# Patient Record
Sex: FEMALE | Race: WHITE | NOT HISPANIC OR LATINO | Employment: OTHER | URBAN - METROPOLITAN AREA
[De-identification: names, ages, dates, MRNs, and addresses within clinical notes are randomized per-mention and may not be internally consistent; named-entity substitution may affect disease eponyms.]

---

## 2017-01-05 ENCOUNTER — PROCEDURE VISIT (OUTPATIENT)
Dept: UROLOGY | Facility: CLINIC | Age: 67
End: 2017-01-05
Payer: MEDICARE

## 2017-01-05 VITALS — HEART RATE: 82 BPM | DIASTOLIC BLOOD PRESSURE: 80 MMHG | SYSTOLIC BLOOD PRESSURE: 134 MMHG

## 2017-01-05 DIAGNOSIS — N20.0 KIDNEY STONE: ICD-10-CM

## 2017-01-05 PROCEDURE — 52310 CYSTOSCOPY AND TREATMENT: CPT | Mod: S$GLB,,, | Performed by: UROLOGY

## 2017-01-05 NOTE — PROCEDURES
Procedures   Procedure: Flexible cysto-uretheroscopy and stent removal   Pre Procedure Diagnosis:s/p ureteroscopy and stent placement  Post Procedure Diagnosis:same   Surgeon: Greg Lyons MD   Anesthesia: 2% uro-jet lidocaine jelly for local analgesia   Flexible cysto-urethroscopy was performed after consent was obtained. The risks and benefits were explained.   2% lidocaine urojet was used for local analgesia.   The genitalia was prepped and draped in the sterile fashion with betadine.   The flexible scope was advanced into the urethra and into the bladder. The stent was removed without difficulty.   The patient tolerated the procedure well without complication.   They will follow up in 6 weeks with a renal ultrasound and KUB.

## 2017-01-13 ENCOUNTER — PATIENT MESSAGE (OUTPATIENT)
Dept: UROLOGY | Facility: CLINIC | Age: 67
End: 2017-01-13

## 2017-01-13 ENCOUNTER — LAB VISIT (OUTPATIENT)
Dept: LAB | Facility: HOSPITAL | Age: 67
End: 2017-01-13
Attending: UROLOGY
Payer: MEDICARE

## 2017-01-13 ENCOUNTER — TELEPHONE (OUTPATIENT)
Dept: UROLOGY | Facility: CLINIC | Age: 67
End: 2017-01-13

## 2017-01-13 DIAGNOSIS — N39.0 URINARY TRACT INFECTION WITH HEMATURIA, SITE UNSPECIFIED: Primary | ICD-10-CM

## 2017-01-13 DIAGNOSIS — N39.0 URINARY TRACT INFECTION WITH HEMATURIA, SITE UNSPECIFIED: ICD-10-CM

## 2017-01-13 DIAGNOSIS — R31.9 URINARY TRACT INFECTION WITH HEMATURIA, SITE UNSPECIFIED: ICD-10-CM

## 2017-01-13 DIAGNOSIS — R31.9 URINARY TRACT INFECTION WITH HEMATURIA, SITE UNSPECIFIED: Primary | ICD-10-CM

## 2017-01-13 PROCEDURE — 87186 SC STD MICRODIL/AGAR DIL: CPT

## 2017-01-13 PROCEDURE — 87088 URINE BACTERIA CULTURE: CPT

## 2017-01-13 PROCEDURE — 87077 CULTURE AEROBIC IDENTIFY: CPT

## 2017-01-13 PROCEDURE — 87086 URINE CULTURE/COLONY COUNT: CPT

## 2017-01-13 NOTE — TELEPHONE ENCOUNTER
----- Message from Mackenzie Fried sent at 1/13/2017 12:33 PM CST -----  Contact: [phone]/ self   Patient requesting to speak with you regarding bladder infection.    Patient is requesting to drop urine off .    Please advise.

## 2017-01-16 ENCOUNTER — PATIENT MESSAGE (OUTPATIENT)
Dept: UROLOGY | Facility: CLINIC | Age: 67
End: 2017-01-16

## 2017-01-16 ENCOUNTER — TELEPHONE (OUTPATIENT)
Dept: UROLOGY | Facility: CLINIC | Age: 67
End: 2017-01-16

## 2017-01-16 ENCOUNTER — PATIENT OUTREACH (OUTPATIENT)
Dept: OTHER | Facility: OTHER | Age: 67
End: 2017-01-16
Payer: MEDICARE

## 2017-01-16 LAB — BACTERIA UR CULT: NORMAL

## 2017-01-16 RX ORDER — AMOXICILLIN AND CLAVULANATE POTASSIUM 875; 125 MG/1; MG/1
1 TABLET, FILM COATED ORAL 2 TIMES DAILY
Qty: 20 TABLET | Refills: 0 | Status: SHIPPED | OUTPATIENT
Start: 2017-01-16 | End: 2017-01-16 | Stop reason: ALTCHOICE

## 2017-01-16 NOTE — LETTER
"   Frances Sanches, PharmD  [address] [place] [address]     Dear Vivienne Jose,    Welcome to the [facility]!         My name is Frances Sanches and I am your dedicated clinical pharmacist.  As an expert in medication management, I will help ensure that the medications you are taking continue to provide you with the intended benefits.      This is Maria Luisa Dior and she will be your health  for the duration of the program.  Her job is to help you identify lifestyle changes to improve your blood pressure control.  You will talk about nutrition, exercise, and other ways that you may be able to adjust your current habits to better your health. Together, we will work to improve your overall health and encourage you to meet your goals for a healthier lifestyle.    What we expect from YOU:    You will need to take blood pressure readings multiple times a week and no less than one reading per week.   It is important that you take your measurements at different times during the day, when possible.     What you should expect from your Digital Medicine Care Team:   We will provide you with education about high blood pressure, including lifestyle changes that could help you to control your blood pressure.   We will review your weekly readings and provide you with monthly blood pressure progress reports after you have been in the program for more than 30 days.   We will send monthly progress reports on your blood pressure control to your physician so they can follow along with your progress as well.    You will be able to reach me by phone at   or through your [facility] account by clicking "Send a message to your doctor's office" on the home screen then selecting my name in the "To the office of:" field.     I look forward to working with you to achieve your blood pressure goals!    Sincerely,    Frances Sanches PharmD  Your personal Clinical Pharmacist    Please visit " [email or web address] to learn more about high blood pressure and what you can do lower your blood pressure.                                                                                         Vivienne Jose  [address]  [place] [address]

## 2017-01-16 NOTE — TELEPHONE ENCOUNTER
----- Message from Greg Lyons MD sent at 1/16/2017 12:31 PM CST -----  Ordering augmentin.  Please call patient and notify of positive culture and antibiotics.

## 2017-01-18 ENCOUNTER — PATIENT MESSAGE (OUTPATIENT)
Dept: ENDOCRINOLOGY | Facility: CLINIC | Age: 67
End: 2017-01-18

## 2017-01-18 DIAGNOSIS — E78.5 HYPERLIPIDEMIA: ICD-10-CM

## 2017-01-18 DIAGNOSIS — E11.9 TYPE 2 DIABETES MELLITUS WITHOUT COMPLICATION: ICD-10-CM

## 2017-01-18 RX ORDER — LOVASTATIN 40 MG/1
TABLET ORAL
Qty: 90 TABLET | Refills: 0 | Status: SHIPPED | OUTPATIENT
Start: 2017-01-18 | End: 2017-04-29 | Stop reason: SDUPTHER

## 2017-01-18 RX ORDER — METFORMIN HYDROCHLORIDE 1000 MG/1
TABLET ORAL
Qty: 180 TABLET | Refills: 0 | Status: SHIPPED | OUTPATIENT
Start: 2017-01-18 | End: 2017-10-08 | Stop reason: SDUPTHER

## 2017-01-19 ENCOUNTER — PATIENT MESSAGE (OUTPATIENT)
Dept: ALLERGY | Facility: CLINIC | Age: 67
End: 2017-01-19

## 2017-01-19 ENCOUNTER — PATIENT OUTREACH (OUTPATIENT)
Dept: OTHER | Facility: OTHER | Age: 67
End: 2017-01-19
Payer: MEDICARE

## 2017-01-19 ENCOUNTER — CLINICAL SUPPORT (OUTPATIENT)
Dept: INTERNAL MEDICINE | Facility: CLINIC | Age: 67
End: 2017-01-19
Payer: MEDICARE

## 2017-01-19 DIAGNOSIS — J30.1 SEASONAL ALLERGIC RHINITIS DUE TO POLLEN: ICD-10-CM

## 2017-01-19 DIAGNOSIS — J30.81 NON-SEASONAL ALLERGIC RHINITIS DUE TO ANIMAL HAIR AND DANDER: ICD-10-CM

## 2017-01-19 PROCEDURE — 95115 IMMUNOTHERAPY ONE INJECTION: CPT | Mod: S$GLB,,, | Performed by: INTERNAL MEDICINE

## 2017-01-19 NOTE — PROGRESS NOTES
Patient came in for her monthly allergy injection. 0.5ml of vial #5, BLACK, given to the upper left posterior arm.  Patient tolerated injection well with no reaction  Noted or verbalized.

## 2017-01-19 NOTE — PROGRESS NOTES
"Last 5 Patient Entered Readings                                                               Current 30 Day Average: 140/71     Recent Readings 1/18/2017 1/17/2017 1/15/2017 1/14/2017 1/14/2017    Systolic BP (mmHg) 140 136 138 146 151    Diastolic BP (mmHg) 67 71 68 78 83    Pulse 70 67 64 68 72        Follow up with Ms. Vivienne Jose completed. Ms. Palafox is doing well. She has had "a lot of things come up" so she hasn't been able to join the gym yet. She has been cooking and eating more at home which helps her better monitor her sodium intake. Patient did not have any further questions or concerns. I will follow up in a few weeks to see how she is doing and progressing.          "

## 2017-01-20 ENCOUNTER — OFFICE VISIT (OUTPATIENT)
Dept: ALLERGY | Facility: CLINIC | Age: 67
End: 2017-01-20
Payer: MEDICARE

## 2017-01-20 VITALS
BODY MASS INDEX: 43.51 KG/M2 | DIASTOLIC BLOOD PRESSURE: 70 MMHG | OXYGEN SATURATION: 97 % | SYSTOLIC BLOOD PRESSURE: 148 MMHG | WEIGHT: 245.56 LBS | HEIGHT: 63 IN | HEART RATE: 80 BPM

## 2017-01-20 DIAGNOSIS — H10.13 ALLERGIC CONJUNCTIVITIS, BILATERAL: ICD-10-CM

## 2017-01-20 DIAGNOSIS — J30.9 CHRONIC ALLERGIC RHINITIS: Primary | ICD-10-CM

## 2017-01-20 DIAGNOSIS — Z51.6 ALLERGY DESENSITIZATION THERAPY: ICD-10-CM

## 2017-01-20 PROCEDURE — 3078F DIAST BP <80 MM HG: CPT | Mod: S$GLB,,, | Performed by: ALLERGY & IMMUNOLOGY

## 2017-01-20 PROCEDURE — 99214 OFFICE O/P EST MOD 30 MIN: CPT | Mod: S$GLB,,, | Performed by: ALLERGY & IMMUNOLOGY

## 2017-01-20 PROCEDURE — 3077F SYST BP >= 140 MM HG: CPT | Mod: S$GLB,,, | Performed by: ALLERGY & IMMUNOLOGY

## 2017-01-20 PROCEDURE — 1159F MED LIST DOCD IN RCRD: CPT | Mod: S$GLB,,, | Performed by: ALLERGY & IMMUNOLOGY

## 2017-01-20 PROCEDURE — 99999 PR PBB SHADOW E&M-EST. PATIENT-LVL III: CPT | Mod: PBBFAC,,, | Performed by: ALLERGY & IMMUNOLOGY

## 2017-01-20 RX ORDER — FLUTICASONE PROPIONATE 50 MCG
2 SPRAY, SUSPENSION (ML) NASAL DAILY
Qty: 16 G | Refills: 12
Start: 2017-01-20 | End: 2017-12-06 | Stop reason: SDUPTHER

## 2017-01-20 NOTE — PROGRESS NOTES
Subjective:       Patient ID: Vivienne Jose is a 66 y.o. female.    Chief Complaint:  Allergies (annual visit, extract expiring)      HPI Comments: 66 year-old woman presents for continued evaluation of chronic allergic rhinitis and conjunctivitis on IT.  She was last seen 10/16/15. In 2012 she had skin test with 4+ cat and dust mites and 1+ willow tree.  She was started on Claritin daily and fluticasone and Zaditor drops in morning.  She did ok on this but had flares so added azelastine 2 SEN BID as needed.  She would still  get sneeze, runny nose, sore throat, dry itchy eyes, stuffy nose and PND.  She started allergy shots 7/2015. She reached maintenance about 12/2015. She has don much better, feels shots really help. No bad flares. No sinus infections. She has occ sneeze fits but minimal runny nose or congestion. The week before shots she gets itchy eyes. She is on Flonase 2 SEN in AM, azelastine 1 SEN in PM, Zaditor 1 drop each eye in AM and Claritin at night.  She has had no reactions to shots, no itch, no hives, no swelling, no SOB, no wheeze, no dizziness.  She does have DM covers on all bedding.  She has a dog at home but no cats.  Occ has bad cough and when does adds Mucinex DM and helps.    No CAD, she had angiogram and told mild blockage but no stents.  She is on ACE-I and ca channel blocker for HTN.      new patient evaluation of allergies.  She used to see Dr Weber and was on allergy shots from [id number].  She states they worked great and all symptoms resolved.  However over last year she has felt symptoms returning.  Her eyes itch intensely and are dry at times and water at times.  She has runny nose only when eating otherwise has some sinus pressure and PND>  No stuffy nose.  She sneezes a few times each morning.  She has always been worse around cats but does not have one.  Occ has chest congestion but no asthma.  NO eczema.  No food allergy.  She was worse few months ago otherwise not sure of  season.  No difference nay time of day, no diff inside or out.  Claritin prn helps but makes her sleepy.  Using Zaditor with minimal relief.        Environmental History: see history    Review of Systems   Constitutional: Negative for fever, chills, appetite change and fatigue.   HENT: Positive for rhinorrhea, sneezing and postnasal drip. Negative for ear pain, nosebleeds, congestion, sore throat, facial swelling, trouble swallowing, voice change, sinus pressure and ear discharge.    Eyes: Positive for discharge and itching. Negative for redness and visual disturbance.   Respiratory: Negative for cough, choking, chest tightness, shortness of breath and wheezing.    Cardiovascular: Negative for chest pain, palpitations and leg swelling.   Gastrointestinal: Negative for nausea, vomiting, abdominal pain, diarrhea, constipation and abdominal distention.   Genitourinary: Negative for difficulty urinating.   Musculoskeletal: Negative for myalgias, joint swelling, arthralgias and gait problem.   Skin: Negative for color change and rash.   Neurological: Negative for dizziness, syncope, weakness, light-headedness and headaches.   Hematological: Negative for adenopathy. Does not bruise/bleed easily.   Psychiatric/Behavioral: Negative for behavioral problems, confusion, disturbed wake/sleep cycle and agitation. The patient is not nervous/anxious.         Objective:    Physical Exam   Nursing note and vitals reviewed.  Constitutional: She is oriented to person, place, and time. She appears well-developed and well-nourished. No distress.   HENT:   Head: Normocephalic and atraumatic.   Right Ear: Hearing, tympanic membrane, external ear and ear canal normal.   Left Ear: Hearing, tympanic membrane, external ear and ear canal normal.   Nose: No mucosal edema, rhinorrhea, sinus tenderness or septal deviation. No epistaxis. Right sinus exhibits no maxillary sinus tenderness and no frontal sinus tenderness. Left sinus exhibits no  maxillary sinus tenderness and no frontal sinus tenderness.   Mouth/Throat: Uvula is midline, oropharynx is clear and moist and mucous membranes are normal. No uvula swelling.   Eyes: Conjunctivae are normal. Right eye exhibits no discharge. Left eye exhibits no discharge.   Neck: Normal range of motion. No thyromegaly present.   Cardiovascular: Normal rate, regular rhythm and normal heart sounds.    No murmur heard.  Pulmonary/Chest: Effort normal and breath sounds normal. No respiratory distress. She has no wheezes.   Abdominal: Soft. She exhibits no distension. There is no tenderness.   Musculoskeletal: Normal range of motion. She exhibits no edema and no tenderness.   Lymphadenopathy:     She has no cervical adenopathy.   Neurological: She is alert and oriented to person, place, and time.   Skin: Skin is warm and dry. No rash noted. No erythema.   Psychiatric: She has a normal mood and affect. Her behavior is normal. Judgment and thought content normal.       Laboratory:   Percutaneous Skin Testing: Inhalants- 4+ dust mites, 3+ cat, 1+ willow with 3+ histamine control and remainder all negative  Assessment:       1. Chronic allergic rhinitis    2. Allergic conjunctivitis, bilateral    3. Allergy desensitization therapy         Plan:       1. Dust mite avoidance, measures discussed and handout provided.   2. Continue Flonase 2 SEN daily  3. Continue Claritin 10 mg daily  4. Zaditor prn  5. Continue azelastine 2 SEN BID as needed  6.continue immunotherapy, Discussed new allergy shot system with patient, advised on security measures and need to back up dose. Patient voiced understanding and agreed.    Patient advised to remain off beta blockers while on allergy shots and to notify injection clinic and MD of any new medications starts.

## 2017-01-25 DIAGNOSIS — I15.2 HYPERTENSION ASSOCIATED WITH DIABETES: ICD-10-CM

## 2017-01-25 DIAGNOSIS — E11.59 HYPERTENSION ASSOCIATED WITH DIABETES: ICD-10-CM

## 2017-01-26 ENCOUNTER — PATIENT MESSAGE (OUTPATIENT)
Dept: ENDOCRINOLOGY | Facility: CLINIC | Age: 67
End: 2017-01-26

## 2017-01-26 RX ORDER — LOSARTAN POTASSIUM AND HYDROCHLOROTHIAZIDE 25; 100 MG/1; MG/1
TABLET ORAL
Qty: 90 TABLET | Refills: 1 | Status: SHIPPED | OUTPATIENT
Start: 2017-01-26 | End: 2017-03-15 | Stop reason: ALTCHOICE

## 2017-01-31 ENCOUNTER — PATIENT OUTREACH (OUTPATIENT)
Dept: OTHER | Facility: OTHER | Age: 67
End: 2017-01-31
Payer: MEDICARE

## 2017-01-31 NOTE — PROGRESS NOTES
"Last 5 Patient Entered Readings                                                               Current 30 Day Average: 142/72     Recent Readings 1/30/2017 1/29/2017 1/29/2017 1/28/2017 1/27/2017    Systolic BP (mmHg) 134 133 157 153 137    Diastolic BP (mmHg) 73 72 78 77 72    Pulse 66 67 70 73 68        Ms Jose's BP is not at goal. Patient denies any symptoms. She is not sure her cuff fits appropriately. She cannot get it 2-3 cm (2 finger widths) above the crease of her elbow. When she closes it the flap is outside of the "ok" zone. She will go to the [facility] to be resized.     Current HTN regimen:  Outpatient Hypertension Medications as of 2/1/2017             amlodipine (NORVASC) 5 MG tablet Take 1 tablet (5 mg total) by mouth once daily.    losartan-hydrochlorothiazide 100-25 mg (HYZAAR) 100-25 mg per tablet TAKE 1 TABLET ONE TIME DAILY          Changes made today:  None, will await readings from new cuff.     Will continue to monitor regularly. Will follow up in 2-3 weeks, sooner if BP begins to trend upward or downward.    Patient has my contact information and knows to call with any concerns or clinical changes.     "

## 2017-02-01 ENCOUNTER — LAB VISIT (OUTPATIENT)
Dept: LAB | Facility: HOSPITAL | Age: 67
End: 2017-02-01
Attending: UROLOGY
Payer: MEDICARE

## 2017-02-01 DIAGNOSIS — N20.0 KIDNEY STONES: ICD-10-CM

## 2017-02-01 DIAGNOSIS — N39.0 URINARY TRACT INFECTION WITHOUT HEMATURIA, SITE UNSPECIFIED: ICD-10-CM

## 2017-02-01 PROCEDURE — 83735 ASSAY OF MAGNESIUM: CPT

## 2017-02-02 ENCOUNTER — TELEPHONE (OUTPATIENT)
Dept: UROLOGY | Facility: CLINIC | Age: 67
End: 2017-02-02

## 2017-02-02 ENCOUNTER — OFFICE VISIT (OUTPATIENT)
Dept: ENDOCRINOLOGY | Facility: CLINIC | Age: 67
End: 2017-02-02
Payer: MEDICARE

## 2017-02-02 ENCOUNTER — LAB VISIT (OUTPATIENT)
Dept: LAB | Facility: HOSPITAL | Age: 67
End: 2017-02-02
Attending: UROLOGY
Payer: MEDICARE

## 2017-02-02 ENCOUNTER — PATIENT MESSAGE (OUTPATIENT)
Dept: UROLOGY | Facility: CLINIC | Age: 67
End: 2017-02-02

## 2017-02-02 VITALS
HEIGHT: 63 IN | HEART RATE: 82 BPM | SYSTOLIC BLOOD PRESSURE: 140 MMHG | WEIGHT: 245.56 LBS | RESPIRATION RATE: 16 BRPM | DIASTOLIC BLOOD PRESSURE: 80 MMHG | BODY MASS INDEX: 43.51 KG/M2

## 2017-02-02 DIAGNOSIS — N39.0 URINARY TRACT INFECTION WITHOUT HEMATURIA, SITE UNSPECIFIED: ICD-10-CM

## 2017-02-02 DIAGNOSIS — N39.0 URINARY TRACT INFECTION WITHOUT HEMATURIA, SITE UNSPECIFIED: Primary | ICD-10-CM

## 2017-02-02 DIAGNOSIS — E11.9 TYPE 2 DIABETES MELLITUS WITHOUT COMPLICATION, WITH LONG-TERM CURRENT USE OF INSULIN: ICD-10-CM

## 2017-02-02 DIAGNOSIS — E78.2 MIXED HYPERLIPIDEMIA: Primary | ICD-10-CM

## 2017-02-02 DIAGNOSIS — Z79.4 TYPE 2 DIABETES MELLITUS WITHOUT COMPLICATION, WITH LONG-TERM CURRENT USE OF INSULIN: ICD-10-CM

## 2017-02-02 DIAGNOSIS — E03.9 HYPOTHYROIDISM, UNSPECIFIED TYPE: ICD-10-CM

## 2017-02-02 DIAGNOSIS — I10 ESSENTIAL HYPERTENSION: ICD-10-CM

## 2017-02-02 PROCEDURE — 3079F DIAST BP 80-89 MM HG: CPT | Mod: S$GLB,,, | Performed by: INTERNAL MEDICINE

## 2017-02-02 PROCEDURE — 99499 UNLISTED E&M SERVICE: CPT | Mod: S$GLB,,, | Performed by: INTERNAL MEDICINE

## 2017-02-02 PROCEDURE — 3077F SYST BP >= 140 MM HG: CPT | Mod: S$GLB,,, | Performed by: INTERNAL MEDICINE

## 2017-02-02 PROCEDURE — 99214 OFFICE O/P EST MOD 30 MIN: CPT | Mod: S$GLB,,, | Performed by: INTERNAL MEDICINE

## 2017-02-02 PROCEDURE — 3066F NEPHROPATHY DOC TX: CPT | Mod: S$GLB,,, | Performed by: INTERNAL MEDICINE

## 2017-02-02 PROCEDURE — 3045F PR MOST RECENT HEMOGLOBIN A1C LEVEL 7.0-9.0%: CPT | Mod: S$GLB,,, | Performed by: INTERNAL MEDICINE

## 2017-02-02 PROCEDURE — 99999 PR PBB SHADOW E&M-EST. PATIENT-LVL III: CPT | Mod: PBBFAC,,, | Performed by: INTERNAL MEDICINE

## 2017-02-02 RX ORDER — ACETAMINOPHEN AND PHENYLEPHRINE HCL 325; 5 MG/1; MG/1
TABLET ORAL
COMMUNITY
End: 2019-04-30

## 2017-02-02 NOTE — MR AVS SNAPSHOT
[facility]  [address]  [facility] [address]  Phone: [phone]  Fax: [phone]                  Vivienne Jose   2017 10:00 AM   Office Visit    Description:  Female : 1950   Provider:  Faizan Bullard MD   Department:  [facility]           Reason for Visit     Diabetes Mellitus           Diagnoses this Visit        Comments    Mixed hyperlipidemia    -  Primary     Hypothyroidism, unspecified type         Type 2 diabetes mellitus without complication, with long-term current use of insulin         Essential hypertension                To Do List           Future Appointments        Provider Department Dept Phone    2017 10:00 AM [place], ALLERGY INJECTION [place] - Internal Medicine [phone]    2017 8:00 AM [facility] US1 [facility] [phone]    2017 9:00 AM [facility] XR1 [facility] [phone]    3/2/2017 9:30 AM Greg Lyons MD [facility] Urology [phone]    2017 7:00 AM LAB, [facility] [phone]      Goals (5 Years of Data)              Today    17    Blood Pressure <= 140/90   140/80  143/76  144/77    Notes - Note created  2016  1:27 PM by Maria Luisa Dior    It is important to consistently maintain a controlled blood pressure.      Exercise at least 150 minutes per week.           Maintain a low sodium diet           Take at least one BP reading per week at various times of the day           Weight (lb) < 106.6 kg (235 lb)   111.4 kg (245 lb 9.5 oz)        Notes - Note created  2016  2:41 PM by Maria Luisa Dior    Decrease weight by 5% to reduce cardiovascular risk factors        Follow-Up and Disposition     Return in about 4 months (around 2017).      [facility]     [facility] -  Assistance  Registered nurses in the [facility] provide clinical advisement, health education,  appointment booking, and other advisory services.  Call for this free service at [phone].             Medications           Message regarding Medications     Verify the changes and/or additions to your medication regime listed below are the same as discussed with your clinician today.  If any of these changes or additions are incorrect, please notify your healthcare provider.             Verify that the below list of medications is an accurate representation of the medications you are currently taking.  If none reported, the list may be blank. If incorrect, please contact your healthcare provider. Carry this list with you in case of emergency.           Current Medications     amlodipine (NORVASC) 5 MG tablet Take 1 tablet (5 mg total) by mouth once daily.    aspirin (ECOTRIN) 81 MG EC tablet Take 81 mg by mouth once daily.    azelastine (ASTELIN) 137 mcg (0.1 %) nasal spray 1 spray (137 mcg total) by Nasal route daily as needed for Rhinitis.    biotin 10,000 mcg Cap Take by mouth.    blood sugar diagnostic (BLOOD GLUCOSE TEST) Strp 1 strip by Misc.(Non-Drug; Combo Route) route 2 (two) times daily. [employer] True Metrix test strips    blood-glucose meter Misc [employer] True Metrix Air meter    calcium carbonate-vitamin D3 600 mg(1,500mg) -100 unit Cap Take 1 tablet by mouth once daily.    diclofenac sodium (VOLTAREN) 1 % Gel Apply 2 g topically 4 (four) times daily.    econazole nitrate 1 % cream Apply topically once daily.    fluticasone (FLONASE) 50 mcg/actuation nasal spray 2 sprays by Each Nare route once daily.    glimepiride (AMARYL) 4 MG tablet Take 1 tablet (4 mg total) by mouth daily with breakfast.    hydrocodone-acetaminophen 5-325mg (NORCO) 5-325 mg per tablet Take 1 tablet by mouth every 6 (six) hours as needed for Pain.    insulin detemir (LEVEMIR FLEXPEN) 100 unit/mL (3 mL) SubQ InPn pen Inject 14 Units into the skin every evening.    lancets 28 gauge Misc 1 lancet by Misc.(Non-Drug; Combo Route)  "route 2 (two) times daily. For True Metrix meter    levothyroxine (SYNTHROID) 75 MCG tablet TAKE 1 TABLET ONE TIME DAILY    liraglutide 0.6 mg/0.1 mL, 18 mg/3 mL, subq PNIJ (VICTOZA 2-TAWANA) 0.6 mg/0.1 mL (18 mg/3 mL) PnIj Inject 1.8 mg into the skin once daily.    loratadine (CLARITIN) 10 mg tablet Take 10 mg by mouth once daily.    losartan-hydrochlorothiazide 100-25 mg (HYZAAR) 100-25 mg per tablet TAKE 1 TABLET ONE TIME DAILY    lovastatin (MEVACOR) 40 MG tablet TAKE 1 TABLET  NIGHTLY    magnesium oxide-Mg AA chelate (MAGNESIUM, AMINO ACID CHELATE,) 133 mg Tab Take by mouth as directed.     metformin (GLUCOPHAGE) 1000 MG tablet TAKE 1 TABLET TWICE DAILY WITH MEALS    nitrofurantoin (MACRODANTIN) 100 MG capsule Take 1 capsule (100 mg total) by mouth every 12 (twelve) hours.    omeprazole (PRILOSEC) 20 MG capsule TAKE 1 CAPSULE EVERY DAY AS NEEDED  FOR  GERD    pen needle, diabetic (BD ULTRA-FINE EDUARDO PEN NEEDLES) 32 gauge x 5/32" Ndle Use with victoza and levemir, 2 injections daily    UNABLE TO FIND Zatador eye drops for allergies    valacyclovir (VALTREX) 500 MG tablet TAKE 1 TABLET ONE TIME DAILY           Clinical Reference Information           Your Vitals Were     BP Pulse Resp Height Weight BMI    140/80 (BP Location: Right arm, Patient Position: Sitting, BP Method: Manual) 82 16 5' 3" (1.6 m) 111.4 kg (245 lb 9.5 oz) 43.5 kg/m2      Blood Pressure          Most Recent Value    BP  (!)  140/80      Allergies as of 2/2/2017     Sulfa (Sulfonamide Antibiotics)    Ciprofloxacin    Januvia [Sitagliptin]    Lisinopril    Lotensin [Benazepril]    Nexium [Esomeprazole Magnesium]      Immunizations Administered on Date of Encounter - 2/2/2017     None      Orders Placed During Today's Visit     Future Labs/Procedures Expected by Expires    LIPID PANEL  2/2/2017 4/3/2018      Language Assistance Services     ATTENTION: Language assistance services are available, free of charge. Please call [phone].  "     ATENCIÓN: Si habla español, tiene a carter disposición servicios gratuitos de asistencia lingüística. Thomas al [phone].     CHÚ Ý: N?u b?n nói Ti?ng Vi?t, có các d?ch v? h? tr? ngôn ng? mi?n phí dành cho b?n. G?i s? [phone].         [facility] complies with applicable Federal civil rights laws and does not discriminate on the basis of race, color, national origin, age, disability, or sex.

## 2017-02-02 NOTE — PROGRESS NOTES
"Subjective:       Patient ID: Vivienne Jose is a 66 y.o. female.    Chief Complaint: Diabetes Mellitus      Mrs.Linda RICKY Jose comes for f/u for her T2DM   She has recurrent UTI   Also has hair fall for past 1 year     HPI     F/U. Initial visit with Dr. Mcmahon 05/2015.  Last visit with Aleena Ponce 2/2016        Diagnosed with DM in her  early 50s   She was told she had prediabetes in her 30s       Known complications PN and nephropathy  Last eye exam 5/2016 : mild DR   Last podiatry exam 4.2015   Admission related to diabetes - none     Current regimen:  Victoza 1.8  Amaryl 4 mg qam   Metformin 1 gm bid   Levemir 14U qhs   in 5/2/16 cortisone injection     Other meds tried:  lantus "did not work" And had trouble with the injection part   actos   januvia - stomach pain      Typical meals - 11 AM lunch, 5 PM dinner, 7 PM snack, sometimes breakfast  Education > 1 year   Exercise : will start     No Polyuria polydipsia nocturia or vision changes  Prone to UTIs  Has BAM using cpap    In 1978 she had thyroidectomy for goiter and multiple cysts     Pt concerned about N/V for several months (started July or August 2015). She did not have N/V when she initially started victoza. She said it occurs very sporadically. No particular time. She is getting full quickly. She has not seen GI. She denies fever or abdominal pain.    Review of Systems   Constitutional: Negative for fatigue and unexpected weight change.   HENT: Negative for trouble swallowing and voice change.    Eyes: Negative for photophobia and visual disturbance.   Gastrointestinal: Positive for nausea. Negative for diarrhea.   Endocrine: Negative for polydipsia and polyuria.   Genitourinary: Positive for dysuria (UTI).   Skin: Negative for wound.   Allergic/Immunologic: Negative for food allergies.   Neurological: Positive for numbness.   Psychiatric/Behavioral: Negative for dysphoric mood. The patient is not nervous/anxious.        Objective:      Physical Exam "   Neck: No thyromegaly present.   Cardiovascular: Normal rate.    Pulmonary/Chest: Effort normal.   Abdominal: Soft.   Musculoskeletal: She exhibits no edema.   Vitals reviewed.      injection sites without hypertrophy or bruising  sensation decreased to vibration       Hemoglobin A1C   Date Value Ref Range Status   12/01/2016 7.0 (H) 4.5 - 6.2 % Final     Comment:     According to ADA guidelines, hemoglobin A1C <7.0% represents  optimal control in non-pregnant diabetic patients.  Different  metrics may apply to specific populations.   Standards of Medical Care in Diabetes - 2016.  For the purpose of screening for the presence of diabetes:  <5.7%     Consistent with the absence of diabetes  5.7-6.4%  Consistent with increasing risk for diabetes   (prediabetes)  >or=6.5%  Consistent with diabetes  Currently no consensus exists for use of hemoglobin A1C  for diagnosis of diabetes for children.     06/13/2016 7.5 (H) 4.5 - 6.2 % Final   02/23/2016 7.8 (H) 4.5 - 6.2 % Final     Lab Results   Component Value Date    TSH 1.689 12/01/2016    TSH 2.033 07/13/2015    TSH 4.182 (H) 04/20/2015     Results for orders placed or performed in visit on 01/13/17   Urine culture   Result Value Ref Range    Urine Culture, Routine ESCHERICHIA COLI  >100,000 cfu/ml          Susceptibility    Escherichia coli - CULTURE, URINE     Amp/Sulbactam >16/8 Resistant mcg/mL     Amikacin <=16 Sensitive mcg/mL     Ampicillin >16 Resistant mcg/mL     Amox/K Clav'ate <=8/4 Sensitive mcg/mL     Ceftriaxone <=8 Sensitive mcg/mL     Cefazolin <=8 Sensitive mcg/mL     Ciprofloxacin >2 Resistant mcg/mL     Cefepime <=8 Sensitive mcg/mL     Ertapenem <=2 Sensitive mcg/mL     Nitrofurantoin <=32 Sensitive mcg/mL     Gentamicin <=4 Sensitive mcg/mL     Meropenem <=4 Sensitive mcg/mL     Piperacillin/Tazo <=16 Sensitive mcg/mL     Trimeth/Sulfa <=2/38 Sensitive mcg/mL     Tetracycline <=4 Sensitive mcg/mL     Tobramycin <=4 Sensitive mcg/mL     US  05/2015  Small right lobe from previous thyroid surgery otherwise unremarkable.    RECOMMENDATION: Repeat ultrasound in 3 years  Assessment:     Plan:       # T2DM with PN, diabetic nephropathy and morbid obesity- reviewed BG log from email    - continue metformin, amaryl, levemir 14 units qhs   - recurrent UTI, there is 4% risk of UTI with victoza but she did get them before   - lets change to victoza 1.2 mg daily   Send me BG log in 1 month   - diet and exercise         -Standards of care:   Lipids ordered  Urine micro uptodate - gets with PCP  CMP today   HgBA1C uptodate   Eye exam- 5/2016 Mild DR  Foot exam- completed.    #Hypertension associated with diabetes on ARB    #Dyslipidemia associated with diabetes -   Uncontrolled TG   Add lipid panel to existing blood work   On statin per ADA recommendations    # Hypothyroidism -   Continue lt4 75 mcg daily  Repeat US 2018  Goal is a normal TSH  Avoid exogenous hyperthyroidism as this can accelerate bone loss and increase risk of CV complications.  Repeat TSH in 1 year

## 2017-02-03 LAB — BACTERIA UR CULT: NORMAL

## 2017-02-03 NOTE — PROGRESS NOTES
Patient, Vivienne Jose (MRN #[id number]), presented with a recorded BMI of 43.5 kg/m^2 consistent with the definition of morbid obesity (ICD-10 E66.01). The patient's morbid obesity was monitored, evaluated, addressed and/or treated. This addendum to the medical record is made on 02/03/2017.

## 2017-02-07 ENCOUNTER — PATIENT MESSAGE (OUTPATIENT)
Dept: UROLOGY | Facility: CLINIC | Age: 67
End: 2017-02-07

## 2017-02-07 DIAGNOSIS — B00.9 RECURRENT HSV (HERPES SIMPLEX VIRUS): ICD-10-CM

## 2017-02-08 RX ORDER — VALACYCLOVIR HYDROCHLORIDE 500 MG/1
TABLET, FILM COATED ORAL
Qty: 90 TABLET | Refills: 1 | Status: SHIPPED | OUTPATIENT
Start: 2017-02-08 | End: 2017-09-17 | Stop reason: SDUPTHER

## 2017-02-10 ENCOUNTER — PATIENT OUTREACH (OUTPATIENT)
Dept: OTHER | Facility: OTHER | Age: 67
End: 2017-02-10
Payer: MEDICARE

## 2017-02-10 NOTE — PROGRESS NOTES
Last 5 Patient Entered Readings                                                               Current 30 Day Average: 144/73     Recent Readings 2/9/2017 2/8/2017 2/7/2017 2/7/2017 2/6/2017    Systolic BP (mmHg) 140 140 140 149 150    Diastolic BP (mmHg) 76 66 65 81 72    Pulse 70 77 70 71 84        Follow up with Ms. Vivienne Jose completed. Mrs. Jose is doing well. Patient was unable to talk much because she was at a restaurant. Patient did not have any further questions or concerns. I will follow up in a few weeks to see how he is doing and progressing.

## 2017-02-15 ENCOUNTER — CLINICAL SUPPORT (OUTPATIENT)
Dept: INTERNAL MEDICINE | Facility: CLINIC | Age: 67
End: 2017-02-15
Payer: MEDICARE

## 2017-02-15 DIAGNOSIS — J30.81 NON-SEASONAL ALLERGIC RHINITIS DUE TO ANIMAL HAIR AND DANDER: ICD-10-CM

## 2017-02-15 DIAGNOSIS — J30.1 SEASONAL ALLERGIC RHINITIS DUE TO POLLEN: ICD-10-CM

## 2017-02-15 PROCEDURE — 95115 IMMUNOTHERAPY ONE INJECTION: CPT | Mod: S$GLB,,, | Performed by: INTERNAL MEDICINE

## 2017-02-23 ENCOUNTER — HOSPITAL ENCOUNTER (OUTPATIENT)
Dept: RADIOLOGY | Facility: HOSPITAL | Age: 67
Discharge: HOME | End: 2017-02-23
Attending: UROLOGY
Payer: MEDICARE

## 2017-02-23 DIAGNOSIS — N20.0 KIDNEY STONE: ICD-10-CM

## 2017-02-23 PROCEDURE — 76770 US EXAM ABDO BACK WALL COMP: CPT | Mod: 26,,, | Performed by: RADIOLOGY

## 2017-02-23 PROCEDURE — 76770 US EXAM ABDO BACK WALL COMP: CPT | Mod: TC

## 2017-02-23 PROCEDURE — 74000 XR ABDOMEN AP 1 VIEW: CPT | Mod: TC

## 2017-02-23 PROCEDURE — 74000 XR ABDOMEN AP 1 VIEW: CPT | Mod: 26,,, | Performed by: RADIOLOGY

## 2017-02-24 LAB — STONE ANALYSIS-IMP: NORMAL

## 2017-03-01 ENCOUNTER — PATIENT OUTREACH (OUTPATIENT)
Dept: OTHER | Facility: OTHER | Age: 67
End: 2017-03-01
Payer: MEDICARE

## 2017-03-01 NOTE — PROGRESS NOTES
Last 5 Patient Entered Readings                                                               Current 30 Day Average: 141/72     Recent Readings 2/28/2017 2/27/2017 2/26/2017 2/25/2017 2/24/2017    Systolic BP (mmHg) 143 144 139 144 132    Diastolic BP (mmHg) 76 73 70 70 70    Pulse 74 71 67 65 64        Ms. Rodrigez BP is not at goal. She denies any symptoms. She said she is able to use the BP cuff that she has and it fits appropriately. Will increase amlodipine today to achieve better BP control.     Current HTN regimen:  Hypertension Medications             amlodipine (NORVASC) 5 MG tablet Take 2 tablets (10 mg total) by mouth once daily.    losartan-hydrochlorothiazide 100-25 mg (HYZAAR) 100-25 mg per tablet TAKE 1 TABLET ONE TIME DAILY          Changes made today:  Increased amlodipine from 5 mg QD to 10 mg QD    Will continue to monitor regularly. Will follow up in 2-3 weeks, sooner if BP begins to trend upward or downward.    Patient has my contact information and knows to call with any concerns or clinical changes.

## 2017-03-02 ENCOUNTER — OFFICE VISIT (OUTPATIENT)
Dept: UROLOGY | Facility: CLINIC | Age: 67
End: 2017-03-02
Payer: MEDICARE

## 2017-03-02 VITALS
HEIGHT: 63 IN | SYSTOLIC BLOOD PRESSURE: 140 MMHG | BODY MASS INDEX: 43.41 KG/M2 | HEART RATE: 66 BPM | DIASTOLIC BLOOD PRESSURE: 62 MMHG | WEIGHT: 245 LBS

## 2017-03-02 DIAGNOSIS — N30.10 INTERSTITIAL CYSTITIS: ICD-10-CM

## 2017-03-02 DIAGNOSIS — N20.0 NEPHROLITHIASIS: Primary | ICD-10-CM

## 2017-03-02 DIAGNOSIS — N39.0 RECURRENT UTI: ICD-10-CM

## 2017-03-02 DIAGNOSIS — E66.01 MORBID OBESITY WITH BMI OF 40.0-44.9, ADULT: ICD-10-CM

## 2017-03-02 LAB
BACTERIA #/AREA URNS AUTO: ABNORMAL /HPF
BILIRUB UR QL STRIP: NEGATIVE
CLARITY UR REFRACT.AUTO: ABNORMAL
COLOR UR AUTO: YELLOW
GLUCOSE UR QL STRIP: NEGATIVE
HGB UR QL STRIP: NEGATIVE
KETONES UR QL STRIP: NEGATIVE
LEUKOCYTE ESTERASE UR QL STRIP: ABNORMAL
MICROSCOPIC COMMENT: ABNORMAL
NITRITE UR QL STRIP: NEGATIVE
NON-SQ EPI CELLS #/AREA URNS AUTO: <1 /HPF
PH UR STRIP: 7 [PH] (ref 5–8)
PROT UR QL STRIP: NEGATIVE
SP GR UR STRIP: 1.01 (ref 1–1.03)
SQUAMOUS #/AREA URNS AUTO: 1 /HPF
URN SPEC COLLECT METH UR: ABNORMAL
UROBILINOGEN UR STRIP-ACNC: NEGATIVE EU/DL
WBC #/AREA URNS AUTO: 19 /HPF (ref 0–5)
WBC CLUMPS UR QL AUTO: ABNORMAL

## 2017-03-02 PROCEDURE — 81001 URINALYSIS AUTO W/SCOPE: CPT

## 2017-03-02 PROCEDURE — 99999 PR PBB SHADOW E&M-EST. PATIENT-LVL III: CPT | Mod: PBBFAC,,, | Performed by: UROLOGY

## 2017-03-02 PROCEDURE — 3077F SYST BP >= 140 MM HG: CPT | Mod: S$GLB,,, | Performed by: UROLOGY

## 2017-03-02 PROCEDURE — 99214 OFFICE O/P EST MOD 30 MIN: CPT | Mod: S$GLB,,, | Performed by: UROLOGY

## 2017-03-02 PROCEDURE — 1160F RVW MEDS BY RX/DR IN RCRD: CPT | Mod: S$GLB,,, | Performed by: UROLOGY

## 2017-03-02 PROCEDURE — 3078F DIAST BP <80 MM HG: CPT | Mod: S$GLB,,, | Performed by: UROLOGY

## 2017-03-02 NOTE — PROGRESS NOTES
"Subjective:      Patient ID: Vivienne Jose is a 66 y.o. female.    Chief Complaint: Results    Patient is a 66 y.o. female who is established to our clinic and was initially referred by their PCP, Dr. escobar for evaluation of kidney stones/recurrent UTI.     HPI  Patient underwent left  ureteroscopy on 12/28/16 during which a stone was extracted.  Symptoms are described as "burning and itching".  Burning was before and during urination.  Itching was all the time.  Improved with monistat as she was concerned for a possible yeast infection.    Ultrasound was independently reviewed today and reveals no hydronephrosis, stable renal cysts, and no stones.  KUB was independently reviewed today and reveals     Review of Systems   All other systems reviewed and negative except pertinent positives noted in HPI.    Objective:     Physical Exam   Constitutional: She is oriented to person, place, and time. She appears well-developed and well-nourished. She is cooperative.  Non-toxic appearance.   HENT:   Head: Normocephalic.   Cardiovascular: Normal rate, intact distal pulses and normal pulses.    Pulmonary/Chest: Effort normal. No accessory muscle usage. No tachypnea. No respiratory distress.   Abdominal: Soft. Normal appearance. She exhibits no distension, no fluid wave, no ascites and no mass. There is no tenderness. There is no rebound and no CVA tenderness. No hernia.   Liver/spleen non-palpable   Musculoskeletal: Normal range of motion.   Neurological: She is alert and oriented to person, place, and time. She has normal strength.   Skin: No bruising and no rash noted.     Psychiatric: She has a normal mood and affect. Her speech is normal and behavior is normal. Thought content normal.        Assessment:     1. Nephrolithiasis    2. Recurrent UTI    3. Interstitial cystitis    4. Morbid obesity with BMI of 40.0-44.9, adult      Plan:     1. Kidney stone:  -General risk factors for kidney stones and the conservative measures " to prevent kidney stones in the future were discussed with the patient in detail.  The patient was encouraged to drink 2-3 liters of water a day, limit iced tea and oanh as well as foods high in oxalate.  They were cautioned to try to limit salt and red meat intake.  We also discussed adding citrate to the diet with the addition of malathi or lemon juice to their water or alternatively with crystal light.   -Imaging review: as above.  No obvious/clinically significant stones.   -24 hour urine: low urine volume, otherwise negative.    2. Possible Interstitial cystitis:  -will refer to female pelvic   -patient with long-standing vague pelvic complaints.     3. Recurrent UTI:  -not clearly related to kidney stones.  -will refer to female pelvic .  -straight cath urine culture today.    4. Morbid obesity:  -recommend diet and exercise  -patient admits to not being as compliant as she should be.   -recommended dietary consultation through [facility].      I spent 25 minutes with the patient of which more than half was spent in direct consultation with the patient in regards to our treatment and plan.

## 2017-03-02 NOTE — MR AVS SNAPSHOT
[facility]  [address] [place] [address] Ave  [place] [address]  Phone: [phone]                  Vivienne Jose   3/2/2017 9:30 AM   Office Visit    Description:  Female : 1950   Provider:  Greg Lyons MD   Department:  [facility]           Reason for Visit     Results           Diagnoses this Visit        Comments    Nephrolithiasis    -  Primary     Recurrent UTI         Interstitial cystitis         Morbid obesity with BMI of 40.0-44.9, adult                To Do List           Future Appointments        Provider Department Dept Phone    2017 7:00 AM LAB, [place] [facility] [phone]    2017 7:15 AM SPECIMEN, [place] [facility] [phone]    2017 10:00 AM DO [facility] - Nephrology [phone]    2017 4:00 PM Jay Jay Felton MD [place] - Internal Medicine [phone]    2017 3:30 PM Lyndon Mathews OD [place] - Optometry [phone]      Goals (5 Years of Data)              Today    3/1/17    2/28/17    Blood Pressure <= 140/90   140/62  140/62  140/62    Notes - Note created  2016  1:27 PM by Maria Luisa Dior    It is important to consistently maintain a controlled blood pressure.      Exercise at least 150 minutes per week.           Maintain a low sodium diet           Take at least one BP reading per week at various times of the day           Weight (lb) < 106.6 kg (235 lb)   111.1 kg (245 lb)        Notes - Note created  2016  2:41 PM by Maria Luisa Dior    Decrease weight by 5% to reduce cardiovascular risk factors        Follow-Up and Disposition     Return in about 1 year (around 3/2/2018).      [facility]     [facility] -  Assistance  Registered nurses in the [facility] provide clinical advisement, health education, appointment booking, and other advisory services.  Call for this free service at [phone].             Medications            Message regarding Medications     Verify the changes and/or additions to your medication regime listed below are the same as discussed with your clinician today.  If any of these changes or additions are incorrect, please notify your healthcare provider.             Verify that the below list of medications is an accurate representation of the medications you are currently taking.  If none reported, the list may be blank. If incorrect, please contact your healthcare provider. Carry this list with you in case of emergency.           Current Medications     amlodipine (NORVASC) 5 MG tablet Take 1 tablet (5 mg total) by mouth once daily.    aspirin (ECOTRIN) 81 MG EC tablet Take 81 mg by mouth once daily.    azelastine (ASTELIN) 137 mcg (0.1 %) nasal spray 1 spray (137 mcg total) by Nasal route daily as needed for Rhinitis.    biotin 10,000 mcg Cap Take by mouth.    blood sugar diagnostic (BLOOD GLUCOSE TEST) Strp 1 strip by Misc.(Non-Drug; Combo Route) route 2 (two) times daily. [employer] True Metrix test strips    blood-glucose meter Misc [employer] True Metrix Air meter    calcium carbonate-vitamin D3 600 mg(1,500mg) -100 unit Cap Take 1 tablet by mouth once daily.    fluticasone (FLONASE) 50 mcg/actuation nasal spray 2 sprays by Each Nare route once daily.    glimepiride (AMARYL) 4 MG tablet Take 1 tablet (4 mg total) by mouth daily with breakfast.    hydrocodone-acetaminophen 5-325mg (NORCO) 5-325 mg per tablet Take 1 tablet by mouth every 6 (six) hours as needed for Pain.    lancets 28 gauge Misc 1 lancet by Misc.(Non-Drug; Combo Route) route 2 (two) times daily. For True Metrix meter    levothyroxine (SYNTHROID) 75 MCG tablet TAKE 1 TABLET ONE TIME DAILY    liraglutide 0.6 mg/0.1 mL, 18 mg/3 mL, subq PNIJ (VICTOZA 2-TAWANA) 0.6 mg/0.1 mL (18 mg/3 mL) PnIj Inject 1.8 mg into the skin once daily.    loratadine (CLARITIN) 10 mg tablet Take 10 mg by mouth once daily.    losartan-hydrochlorothiazide 100-25 mg (HYZAAR)  "100-25 mg per tablet TAKE 1 TABLET ONE TIME DAILY    lovastatin (MEVACOR) 40 MG tablet TAKE 1 TABLET  NIGHTLY    magnesium oxide-Mg AA chelate (MAGNESIUM, AMINO ACID CHELATE,) 133 mg Tab Take by mouth as directed.     metformin (GLUCOPHAGE) 1000 MG tablet TAKE 1 TABLET TWICE DAILY WITH MEALS    nitrofurantoin (MACRODANTIN) 100 MG capsule Take 1 capsule (100 mg total) by mouth every 12 (twelve) hours.    omeprazole (PRILOSEC) 20 MG capsule TAKE 1 CAPSULE EVERY DAY AS NEEDED  FOR  GERD    pen needle, diabetic (BD ULTRA-FINE EDUARDO PEN NEEDLES) 32 gauge x 5/32" Ndle Use with victoza and levemir, 2 injections daily    UNABLE TO FIND Zatador eye drops for allergies    valacyclovir (VALTREX) 500 MG tablet TAKE 1 TABLET ONE TIME DAILY    diclofenac sodium (VOLTAREN) 1 % Gel Apply 2 g topically 4 (four) times daily.    econazole nitrate 1 % cream Apply topically once daily.    insulin detemir (LEVEMIR FLEXPEN) 100 unit/mL (3 mL) SubQ InPn pen Inject 14 Units into the skin every evening.           Clinical Reference Information           Your Vitals Were     BP                   140/62           Blood Pressure          Most Recent Value    BP  (!)  140/62      Allergies as of 3/2/2017     Sulfa (Sulfonamide Antibiotics)    Ciprofloxacin    Januvia [Sitagliptin]    Lisinopril    Lotensin [Benazepril]    Nexium [Esomeprazole Magnesium]      Immunizations Administered on Date of Encounter - 3/2/2017     None      Orders Placed During Today's Visit      Normal Orders This Visit    Ambulatory Referral to Urology       Language Assistance Services     ATTENTION: Language assistance services are available, free of charge. Please call [phone].      ATENCIÓN: Si habla sara, tiene a carter disposición servicios gratuitos de asistencia lingüística. Llame al [phone].     SIVAKUMAR Ý: N?u b?n nói Ti?ng Vi?t, có các d?ch v? h? tr? ngôn ng? mi?n phí dành cho b?n. G?i s? [phone].         [facility] complies with applicable " Federal civil rights laws and does not discriminate on the basis of race, color, national origin, age, disability, or sex.

## 2017-03-06 ENCOUNTER — LAB VISIT (OUTPATIENT)
Dept: LAB | Facility: HOSPITAL | Age: 67
End: 2017-03-06
Attending: UROLOGY
Payer: MEDICARE

## 2017-03-06 DIAGNOSIS — N39.0 RECURRENT UTI: ICD-10-CM

## 2017-03-06 LAB — BACTERIA UR CULT: NORMAL

## 2017-03-06 PROCEDURE — 87186 SC STD MICRODIL/AGAR DIL: CPT

## 2017-03-06 PROCEDURE — 87086 URINE CULTURE/COLONY COUNT: CPT

## 2017-03-06 PROCEDURE — 87077 CULTURE AEROBIC IDENTIFY: CPT

## 2017-03-07 RX ORDER — AMOXICILLIN AND CLAVULANATE POTASSIUM 875; 125 MG/1; MG/1
1 TABLET, FILM COATED ORAL 2 TIMES DAILY
Qty: 20 TABLET | Refills: 0 | Status: SHIPPED | OUTPATIENT
Start: 2017-03-07 | End: 2017-03-17

## 2017-03-15 ENCOUNTER — PATIENT OUTREACH (OUTPATIENT)
Dept: OTHER | Facility: OTHER | Age: 67
End: 2017-03-15
Payer: MEDICARE

## 2017-03-15 DIAGNOSIS — I10 ESSENTIAL HYPERTENSION: Primary | ICD-10-CM

## 2017-03-15 RX ORDER — HYDROCHLOROTHIAZIDE 25 MG/1
25 TABLET ORAL DAILY
Qty: 90 TABLET | Refills: 3 | Status: SHIPPED | OUTPATIENT
Start: 2017-03-15 | End: 2017-12-08 | Stop reason: ALTCHOICE

## 2017-03-15 RX ORDER — VALSARTAN 160 MG/1
160 TABLET ORAL DAILY
Qty: 90 TABLET | Refills: 3 | Status: SHIPPED | OUTPATIENT
Start: 2017-03-15 | End: 2017-06-08 | Stop reason: SDUPTHER

## 2017-03-15 NOTE — PROGRESS NOTES
Last 5 Patient Entered Readings                                                               Current 30 Day Average: 141/72     Recent Readings 3/14/2017 3/12/2017 3/11/2017 3/11/2017 3/10/2017    Systolic BP (mmHg) 130 143 136 152 131    Diastolic BP (mmHg) 67 74 73 79 67    Pulse 74 67 65 70 70            Follow up with Mrs. Vivienne Jose completed. Mrs. Jose has noticed increased swelling in her ankles at night since an increase in amlodipine. She reports that she has been working on her diet and watching what she eats. She and her  have been attending the Diabetes Empowerment classes. Mrs. Jose is still having some trouble finding time to exercise since starting work again. Patient did not have any further questions or concerns. I will follow up in a few weeks to see how she is doing and progressing.

## 2017-03-15 NOTE — PROGRESS NOTES
Last 5 Patient Entered Readings                                                               Current 30 Day Average: 141/72     Recent Readings 3/14/2017 3/12/2017 3/11/2017 3/11/2017 3/10/2017    Systolic BP (mmHg) 130 143 136 152 131    Diastolic BP (mmHg) 67 74 73 79 67    Pulse 74 67 65 70 70        Ms. Jose's BP is not at goal. She started having swelling of her feet and ankles with increased dose of amlodipine. She will cut back down to 5 mg QD. Will change her losartan/HCTZ to valsartan and HCTZ. She will start this after her vacation in April to avoid any possible side effects while she's on vacation. She will continue on losartan/HCTZ until she returns from vacation.    Current HTN regimen:  Hypertension Medications             amlodipine (NORVASC) 5 MG tablet Take 1 tablet (5 mg total) by mouth once daily.    hydrochlorothiazide (HYDRODIURIL) 25 MG tablet Take 1 tablet (25 mg total) by mouth once daily.    valsartan (DIOVAN) 160 MG tablet Take 1 tablet (160 mg total) by mouth once daily.          Will continue to monitor regularly. Will follow up in 4-6 weeks, sooner if BP begins to trend upward or downward.    Patient has my contact information and knows to call with any concerns or clinical changes.

## 2017-04-11 ENCOUNTER — PATIENT OUTREACH (OUTPATIENT)
Dept: OTHER | Facility: OTHER | Age: 67
End: 2017-04-11
Payer: MEDICARE

## 2017-04-11 NOTE — PROGRESS NOTES
Last 5 Patient Entered Readings                                                               Current 30 Day Average: 140/72     Recent Readings 4/9/2017 4/8/2017 4/7/2017 4/6/2017 4/5/2017    Systolic BP (mmHg) 136 145 140 145 141    Diastolic BP (mmHg) 71 78 70 72 71    Pulse 71 68 71 66 70        Follow up with Mrs. Vivienne Jose completed. Mrs. Jose is doing well. Patient has been very busy helping her daughter pack (relocating to [place]). She had planned on starting valsartan and HCTZ this month when she returned from vacation. Unfortunatley, they had to cancel their vacation to get her daughter ready to move. She has been taking amlodipine and losartan HCTZ. She now plans on starting valsartan in May. Mrs. Jose has been watching her diet and staying active helping her daughter. Patient did not have any further questions or concerns. I will follow up in a few weeks to see how she is doing and progressing.

## 2017-04-12 ENCOUNTER — LAB VISIT (OUTPATIENT)
Dept: LAB | Facility: HOSPITAL | Age: 67
End: 2017-04-12
Attending: INTERNAL MEDICINE
Payer: MEDICARE

## 2017-04-12 DIAGNOSIS — E78.2 MIXED HYPERLIPIDEMIA: ICD-10-CM

## 2017-04-12 DIAGNOSIS — E11.9 TYPE 2 DIABETES MELLITUS WITHOUT COMPLICATION, WITHOUT LONG-TERM CURRENT USE OF INSULIN: ICD-10-CM

## 2017-04-12 DIAGNOSIS — R80.9 PROTEINURIA: ICD-10-CM

## 2017-04-12 DIAGNOSIS — I10 ESSENTIAL HYPERTENSION: ICD-10-CM

## 2017-04-12 DIAGNOSIS — I15.2 HYPERTENSION ASSOCIATED WITH DIABETES: ICD-10-CM

## 2017-04-12 DIAGNOSIS — N20.0 KIDNEY STONES: ICD-10-CM

## 2017-04-12 DIAGNOSIS — E11.59 HYPERTENSION ASSOCIATED WITH DIABETES: ICD-10-CM

## 2017-04-12 LAB
ALBUMIN SERPL BCP-MCNC: 3.4 G/DL
ALBUMIN SERPL BCP-MCNC: 3.4 G/DL
ALP SERPL-CCNC: 90 U/L
ALT SERPL W/O P-5'-P-CCNC: 22 U/L
ANION GAP SERPL CALC-SCNC: 14 MMOL/L
ANION GAP SERPL CALC-SCNC: 14 MMOL/L
AST SERPL-CCNC: 20 U/L
BASOPHILS # BLD AUTO: 0.03 K/UL
BASOPHILS NFR BLD: 0.3 %
BILIRUB SERPL-MCNC: 0.3 MG/DL
BUN SERPL-MCNC: 18 MG/DL
BUN SERPL-MCNC: 18 MG/DL
CALCIUM SERPL-MCNC: 9.4 MG/DL
CALCIUM SERPL-MCNC: 9.4 MG/DL
CHLORIDE SERPL-SCNC: 101 MMOL/L
CHLORIDE SERPL-SCNC: 101 MMOL/L
CHOLEST/HDLC SERPL: 2.8 {RATIO}
CO2 SERPL-SCNC: 26 MMOL/L
CO2 SERPL-SCNC: 26 MMOL/L
CREAT SERPL-MCNC: 0.9 MG/DL
CREAT SERPL-MCNC: 0.9 MG/DL
DIFFERENTIAL METHOD: ABNORMAL
EOSINOPHIL # BLD AUTO: 0.4 K/UL
EOSINOPHIL NFR BLD: 4.4 %
ERYTHROCYTE [DISTWIDTH] IN BLOOD BY AUTOMATED COUNT: 15 %
EST. GFR  (AFRICAN AMERICAN): >60 ML/MIN/1.73 M^2
EST. GFR  (AFRICAN AMERICAN): >60 ML/MIN/1.73 M^2
EST. GFR  (NON AFRICAN AMERICAN): >60 ML/MIN/1.73 M^2
EST. GFR  (NON AFRICAN AMERICAN): >60 ML/MIN/1.73 M^2
GLUCOSE SERPL-MCNC: 145 MG/DL
GLUCOSE SERPL-MCNC: 145 MG/DL
HCT VFR BLD AUTO: 41.1 %
HDL/CHOLESTEROL RATIO: 35.4 %
HDLC SERPL-MCNC: 144 MG/DL
HDLC SERPL-MCNC: 51 MG/DL
HGB BLD-MCNC: 13.5 G/DL
LDLC SERPL CALC-MCNC: 72.6 MG/DL
LYMPHOCYTES # BLD AUTO: 2.2 K/UL
LYMPHOCYTES NFR BLD: 24.9 %
MCH RBC QN AUTO: 30.4 PG
MCHC RBC AUTO-ENTMCNC: 32.8 %
MCV RBC AUTO: 93 FL
MONOCYTES # BLD AUTO: 0.9 K/UL
MONOCYTES NFR BLD: 10.3 %
NEUTROPHILS # BLD AUTO: 5.2 K/UL
NEUTROPHILS NFR BLD: 59.8 %
NONHDLC SERPL-MCNC: 93 MG/DL
PHOSPHATE SERPL-MCNC: 3.8 MG/DL
PLATELET # BLD AUTO: 289 K/UL
PMV BLD AUTO: 10.3 FL
POTASSIUM SERPL-SCNC: 3.2 MMOL/L
POTASSIUM SERPL-SCNC: 3.2 MMOL/L
PROT SERPL-MCNC: 7.9 G/DL
PTH-INTACT SERPL-MCNC: 47 PG/ML
RBC # BLD AUTO: 4.44 M/UL
SODIUM SERPL-SCNC: 141 MMOL/L
SODIUM SERPL-SCNC: 141 MMOL/L
TRIGL SERPL-MCNC: 102 MG/DL
WBC # BLD AUTO: 8.72 K/UL

## 2017-04-12 PROCEDURE — 83036 HEMOGLOBIN GLYCOSYLATED A1C: CPT

## 2017-04-12 PROCEDURE — 83970 ASSAY OF PARATHORMONE: CPT

## 2017-04-12 PROCEDURE — 80061 LIPID PANEL: CPT

## 2017-04-12 PROCEDURE — 80069 RENAL FUNCTION PANEL: CPT

## 2017-04-12 PROCEDURE — 85025 COMPLETE CBC W/AUTO DIFF WBC: CPT

## 2017-04-12 PROCEDURE — 36415 COLL VENOUS BLD VENIPUNCTURE: CPT | Mod: PO

## 2017-04-12 PROCEDURE — 80053 COMPREHEN METABOLIC PANEL: CPT

## 2017-04-13 ENCOUNTER — TELEPHONE (OUTPATIENT)
Dept: NEPHROLOGY | Facility: CLINIC | Age: 67
End: 2017-04-13

## 2017-04-13 LAB
ESTIMATED AVG GLUCOSE: 151 MG/DL
HBA1C MFR BLD HPLC: 6.9 %

## 2017-04-13 RX ORDER — POTASSIUM CHLORIDE 20 MEQ/1
20 TABLET, EXTENDED RELEASE ORAL 2 TIMES DAILY
Qty: 60 TABLET | Refills: 0 | Status: SHIPPED | OUTPATIENT
Start: 2017-04-13 | End: 2017-04-20

## 2017-04-17 ENCOUNTER — TELEPHONE (OUTPATIENT)
Dept: NEPHROLOGY | Facility: CLINIC | Age: 67
End: 2017-04-17

## 2017-04-17 NOTE — TELEPHONE ENCOUNTER
Please have her stop taking the potassium supplements as she has been on them for a few days and increase the intake of potasium in her diet.

## 2017-04-17 NOTE — TELEPHONE ENCOUNTER
Please have her stop taking the potassium supplements as she has been on them for a few days and increase the intake of potasium in her diet.     Informed pt of above information given by Dr. Sotomayor/pt verbalized understanding

## 2017-04-20 ENCOUNTER — OFFICE VISIT (OUTPATIENT)
Dept: UROLOGY | Facility: CLINIC | Age: 67
End: 2017-04-20
Payer: MEDICARE

## 2017-04-20 ENCOUNTER — OFFICE VISIT (OUTPATIENT)
Dept: NEPHROLOGY | Facility: CLINIC | Age: 67
End: 2017-04-20
Payer: MEDICARE

## 2017-04-20 VITALS
SYSTOLIC BLOOD PRESSURE: 115 MMHG | DIASTOLIC BLOOD PRESSURE: 61 MMHG | BODY MASS INDEX: 44.06 KG/M2 | HEIGHT: 62 IN | HEART RATE: 68 BPM | WEIGHT: 239.44 LBS

## 2017-04-20 VITALS
OXYGEN SATURATION: 97 % | HEART RATE: 71 BPM | WEIGHT: 239 LBS | DIASTOLIC BLOOD PRESSURE: 64 MMHG | SYSTOLIC BLOOD PRESSURE: 116 MMHG | BODY MASS INDEX: 43.98 KG/M2 | HEIGHT: 62 IN

## 2017-04-20 DIAGNOSIS — N39.0 RECURRENT UTI: Primary | ICD-10-CM

## 2017-04-20 DIAGNOSIS — I10 ESSENTIAL HYPERTENSION: Primary | ICD-10-CM

## 2017-04-20 DIAGNOSIS — N95.2 VAGINAL ATROPHY: ICD-10-CM

## 2017-04-20 DIAGNOSIS — N20.0 KIDNEY STONES: ICD-10-CM

## 2017-04-20 PROCEDURE — 1160F RVW MEDS BY RX/DR IN RCRD: CPT | Mod: S$GLB,,, | Performed by: INTERNAL MEDICINE

## 2017-04-20 PROCEDURE — 99215 OFFICE O/P EST HI 40 MIN: CPT | Mod: 25,S$GLB,, | Performed by: UROLOGY

## 2017-04-20 PROCEDURE — 3074F SYST BP LT 130 MM HG: CPT | Mod: S$GLB,,, | Performed by: INTERNAL MEDICINE

## 2017-04-20 PROCEDURE — 3078F DIAST BP <80 MM HG: CPT | Mod: S$GLB,,, | Performed by: UROLOGY

## 2017-04-20 PROCEDURE — 81002 URINALYSIS NONAUTO W/O SCOPE: CPT | Mod: S$GLB,,, | Performed by: UROLOGY

## 2017-04-20 PROCEDURE — 99999 PR PBB SHADOW E&M-EST. PATIENT-LVL III: CPT | Mod: PBBFAC,,, | Performed by: INTERNAL MEDICINE

## 2017-04-20 PROCEDURE — 3078F DIAST BP <80 MM HG: CPT | Mod: S$GLB,,, | Performed by: INTERNAL MEDICINE

## 2017-04-20 PROCEDURE — 3074F SYST BP LT 130 MM HG: CPT | Mod: S$GLB,,, | Performed by: UROLOGY

## 2017-04-20 PROCEDURE — 99214 OFFICE O/P EST MOD 30 MIN: CPT | Mod: S$GLB,,, | Performed by: INTERNAL MEDICINE

## 2017-04-20 PROCEDURE — 99999 PR PBB SHADOW E&M-EST. PATIENT-LVL III: CPT | Mod: PBBFAC,,, | Performed by: UROLOGY

## 2017-04-20 PROCEDURE — 51701 INSERT BLADDER CATHETER: CPT | Mod: S$GLB,,, | Performed by: UROLOGY

## 2017-04-20 RX ORDER — INSULIN DETEMIR 100 [IU]/ML
14 INJECTION, SOLUTION SUBCUTANEOUS DAILY
COMMUNITY
Start: 2017-03-12 | End: 2017-11-01 | Stop reason: SDUPTHER

## 2017-04-20 NOTE — PATIENT INSTRUCTIONS
[employer] Ultra 25 Billion CFU Probiotic Complex, Multi Strain.  The Multi Strain is specifically the one that is important as the greater variety of strains is better.  Make sure the product is not .

## 2017-04-20 NOTE — MR AVS SNAPSHOT
[facility] - Urology 4th Floor  [address]  [facility] [address]  Phone: [phone]                  Vivienne Jose   2017 8:20 AM   Office Visit    Description:  Female : 1950   Provider:  Patricia Cee MD   Department:  [facility] Urology 4th Floor           Reason for Visit     Other           Diagnoses this Visit        Comments    Recurrent UTI    -  Primary     Vaginal atrophy                To Do List           Future Appointments        Provider Department Dept Phone    2017 10:00 AM Alisia Sotomayor DO [facility] - Nephrology [phone]    2017 4:00 PM Jay Jay Felton MD [place] - Internal Medicine [phone]    2017 10:00 AM [facility] - Optometry [phone]    2017 9:00 AM Faizan Bullard MD [facility] - Endo/Diab/Metab [phone]      Goals (5 Years of Data)              Today    17    Blood Pressure <= 140/90   115/61  115/61  115/61    Notes - Note created  2016  1:27 PM by Maria Luisa Dior    It is important to consistently maintain a controlled blood pressure.      Exercise at least 150 minutes per week.           Maintain a low sodium diet           Take at least one BP reading per week at various times of the day           Weight (lb) < 106.6 kg (235 lb)   108.6 kg (239 lb 6.7 oz)        Notes - Note created  2016  2:41 PM by Maria Luisa Dior    Decrease weight by 5% to reduce cardiovascular risk factors         These Medications        Disp Refills Start End    conjugated estrogens (PREMARIN) vaginal cream 30 g 3 2017    Place 0.5 g vaginally 3 (three) times a week. - Vaginal    Pharmacy: [facility] [id number] - [address] AT [facility] & [address]  #: [phone]         [facility]     [facility] Care [facility] -  Assistance  Unless otherwise directed by your provider, please contact [facility], our nurse care  line that is available for 24/7 assistance.     Registered nurses in the [facility] provide: appointment scheduling, clinical advisement, health education, and other advisory services.  Call: [phone] (toll free)               Medications           Message regarding Medications     Verify the changes and/or additions to your medication regime listed below are the same as discussed with your clinician today.  If any of these changes or additions are incorrect, please notify your healthcare provider.        START taking these NEW medications        Refills    conjugated estrogens (PREMARIN) vaginal cream 3    Starting on: 4/21/2017    Sig: Place 0.5 g vaginally 3 (three) times a week.    Class: Normal    Route: Vaginal      STOP taking these medications     potassium chloride SA (K-DUR,KLOR-CON) 20 MEQ tablet Take 1 tablet (20 mEq total) by mouth 2 (two) times daily.    nitrofurantoin (MACRODANTIN) 100 MG capsule Take 1 capsule (100 mg total) by mouth every 12 (twelve) hours.           Verify that the below list of medications is an accurate representation of the medications you are currently taking.  If none reported, the list may be blank. If incorrect, please contact your healthcare provider. Carry this list with you in case of emergency.           Current Medications     amlodipine (NORVASC) 5 MG tablet Take 1 tablet (5 mg total) by mouth once daily.    aspirin (ECOTRIN) 81 MG EC tablet Take 81 mg by mouth once daily.    azelastine (ASTELIN) 137 mcg (0.1 %) nasal spray 1 spray (137 mcg total) by Nasal route daily as needed for Rhinitis.    biotin 10,000 mcg Cap Take by mouth.    blood sugar diagnostic (BLOOD GLUCOSE TEST) Strp 1 strip by Misc.(Non-Drug; Combo Route) route 2 (two) times daily. [employer] True Metrix test strips    blood-glucose meter Misc [employer] True Metrix Air meter    calcium carbonate-vitamin D3 600 mg(1,500mg) -100 unit Cap Take 1 tablet by mouth once daily.    conjugated  "estrogens (PREMARIN) vaginal cream Starting on Apr 21, 2017. Place 0.5 g vaginally 3 (three) times a week.    diclofenac sodium (VOLTAREN) 1 % Gel Apply 2 g topically 4 (four) times daily.    econazole nitrate 1 % cream Apply topically once daily.    fluticasone (FLONASE) 50 mcg/actuation nasal spray 2 sprays by Each Nare route once daily.    glimepiride (AMARYL) 4 MG tablet Take 1 tablet (4 mg total) by mouth daily with breakfast.    hydrochlorothiazide (HYDRODIURIL) 25 MG tablet Take 1 tablet (25 mg total) by mouth once daily.    hydrocodone-acetaminophen 5-325mg (NORCO) 5-325 mg per tablet Take 1 tablet by mouth every 6 (six) hours as needed for Pain.    lancets 28 gauge Misc 1 lancet by Misc.(Non-Drug; Combo Route) route 2 (two) times daily. For True Metrix meter    LEVEMIR FLEXTOUCH 100 unit/mL (3 mL) InPn pen Inject 14 Units into the skin once daily.    levothyroxine (SYNTHROID) 75 MCG tablet TAKE 1 TABLET ONE TIME DAILY    liraglutide 0.6 mg/0.1 mL, 18 mg/3 mL, subq PNIJ (VICTOZA 2-TAWANA) 0.6 mg/0.1 mL (18 mg/3 mL) PnIj Inject 1.8 mg into the skin once daily.    loratadine (CLARITIN) 10 mg tablet Take 10 mg by mouth once daily.    lovastatin (MEVACOR) 40 MG tablet TAKE 1 TABLET  NIGHTLY    magnesium oxide-Mg AA chelate (MAGNESIUM, AMINO ACID CHELATE,) 133 mg Tab Take by mouth as directed.     metformin (GLUCOPHAGE) 1000 MG tablet TAKE 1 TABLET TWICE DAILY WITH MEALS    omeprazole (PRILOSEC) 20 MG capsule TAKE 1 CAPSULE EVERY DAY AS NEEDED  FOR  GERD    pen needle, diabetic (BD ULTRA-FINE EDUARDO PEN NEEDLES) 32 gauge x 5/32" Ndle Use with victoza and levemir, 2 injections daily    UNABLE TO FIND Zatador eye drops for allergies    valacyclovir (VALTREX) 500 MG tablet TAKE 1 TABLET ONE TIME DAILY    valsartan (DIOVAN) 160 MG tablet Take 1 tablet (160 mg total) by mouth once daily.           Clinical Reference Information           Your Vitals Were     BP Pulse Height Weight BMI    115/61 68 5' 2" (1.575 m) 108.6 kg " (239 lb 6.7 oz) 43.79 kg/m2      Blood Pressure          Most Recent Value    BP  115/61      Allergies as of 2017     Sulfa (Sulfonamide Antibiotics)    Ciprofloxacin    Januvia [Sitagliptin]    Lisinopril    Lotensin [Benazepril]    Nexium [Esomeprazole Magnesium]      Immunizations Administered on Date of Encounter - 2017     None      Instructions    [employer] Ultra 25 Billion CFU Probiotic Complex, Multi Strain.  The Multi Strain is specifically the one that is important as the greater variety of strains is better.  Make sure the product is not .           Language Assistance Services     ATTENTION: Language assistance services are available, free of charge. Please call [phone].      ATENCIÓN: Si mary ann ruiz, tiene a carter disposición servicios gratuitos de asistencia lingüística. Llame al [phone].     SIVAKUMAR Ý: N?u b?n nói Ti?ng Vi?t, có các d?ch v? h? tr? ngôn ng? mi?n phí dành cho b?n. G?i s? [phone].         [facility] 4th Floor complies with applicable Federal civil rights laws and does not discriminate on the basis of race, color, national origin, age, disability, or sex.

## 2017-04-20 NOTE — PROGRESS NOTES
Subjective:       Patient ID: Vivienne Jose is a 66 y.o. White female who presents for follow-up evaluation of No chief complaint on file.    HPI This is a 66-year-old white female with history of diabetes, hypertension, nephrolithiasis, and obstructive sleep apnea is coming in for f/u of kidney stones. No recent stones that she passed. She states her blood pressures are stable in the 130's-140's.70's-80's. Diabetes stable. Creatinine stable.  Removed kidney stone in December with several UTI's -sees urology.  C/o  UTI symptoms currently and was cltxed in urology today and had 2 UTI's recently.      PAST MEDICAL HISTORY: Significant for diabetes, hypertension, obstructive   sleep apnea, hyperlipidemia, frequent UTIs,  lithotripsy, and CAD.     SOCIAL HISTORY: Does not smoke and does not drink. She works as an    at [employer].     FAMILY HISTORY: No family history of kidney stones. Her daughter has   frequent UTIs secondary to reflux.   Review of Systems   Constitutional: Negative for fatigue.   Eyes: Negative for discharge.   Respiratory: Positive for shortness of breath. Negative for cough and wheezing.    Cardiovascular: Negative for chest pain and palpitations.   Gastrointestinal: Negative for abdominal pain, diarrhea, nausea and vomiting.   Genitourinary: Positive for dysuria. Negative for frequency, hematuria and urgency.   Skin: Negative for color change and rash.   Psychiatric/Behavioral: Negative for confusion.   All other systems reviewed and are negative.      Objective:      Physical Exam   Constitutional: She is oriented to person, place, and time. She appears well-developed and well-nourished.   HENT:   Right Ear: External ear normal.   Left Ear: External ear normal.   Nose: Nose normal.   Mouth/Throat: Normal dentition.   Eyes: Conjunctivae, EOM and lids are normal. Pupils are equal, round, and reactive to light.   Neck: Trachea normal. No thyroid mass present.   Cardiovascular:  Normal rate and regular rhythm.  Exam reveals no friction rub.    No murmur heard.  No lower extremity edema   Pulmonary/Chest: Effort normal and breath sounds normal. No respiratory distress. She has no decreased breath sounds. She has no rales.   Abdominal: Soft. Bowel sounds are normal. She exhibits no mass. There is no tenderness. There is no rebound. No hernia.   Musculoskeletal: She exhibits edema.   Trace edema   Neurological: She is alert and oriented to person, place, and time.   Skin: Skin is warm and dry. No rash noted. No erythema.   Psychiatric: She has a normal mood and affect. Judgment normal. Her mood appears not anxious. She does not exhibit a depressed mood.   Oriented to time, place and person.   Vitals reviewed.      Assessment:       No diagnosis found.    Plan:         This is a 66-year-old white female with a history of   diabetes, hypertension and kidney stones is here for f/u.   1. Nephrolithiasis. She has not had any kidney stones in the past 20   years except for a recent 6 mm stone which was lithotripsied as this was   believed to be the nidus for her frequent UTIs-2011 and one in dec 2016. Recent urorirsk profile showed low volume-hydrate well. Low magnesium-on magnesium 600 mg a day. Asked the pt to decraese animal protein intake and to decraese salt and to decrease excessive calcium intake(already on HCTZ).  No stones after recent stone removal-feb 2017 US.  Increase water intake.   2. Hypertension. Blood pressure is stable. Asked to follow low salt diet.   3. Renal: Her creatinines have been stable.   4. DM; 80 mg of proteinuria. Repeat. On ARB.  hbg a1c stable.  Increase potassium intake; no alkalosis or acidosis. Check k, vince, renin ratio with next labs.   Return in 5 months.

## 2017-04-20 NOTE — PROGRESS NOTES
"Pronounced "Ojay"    CHIEF COMPLAINT:    Mrs. Jose is a 66 y.o. female presenting for a consultation at the request of Dr. Greg Lyons. Patient presents with recurrent UTI.    PRESENTING ILLNESS:    Vivienne Jose is a 66 y.o. female who has a long history (since she was in her 20's) of recurrent UTI.  She states that she has a history of nephrolithiasis, saw Dr. Lyons and had a stone treated in December and is presently stone free.  She had a cystoscopy with ureteroscopy, basketing of the stone and stent placement on 2016.  She had an ultrasound on 2017.   She states that her symptoms have been changing.  In the past, when she had a UTI, she had dysuria, urgency, frequency and suprapubic pain.  Now she has dysuria, vaginal itching and irritation which may or may not be bacterial cystitis.  She has occasional stress incontinence, worse when she has a upper respiratory infection.  Otherwise, she has mild stress incontinence but does not wear a pad.  Review of the chart reveals that she has had a positive urine cultures with E coli of differing strains on 3/2/2017, 2017, and 11/10/2016.  In between she had cultures showing no significant growth.  She states that she is on Metformin     , hysterectomy for abnormal endometrial pathology.  Required no further treatment.  She is rarely sexually active (male and female factors, thought that it may contribute to the urinary tract infection.  Bowels are regular.      REVIEW OF SYSTEMS:    Review of Systems   Constitutional: Negative.    HENT: Negative.    Eyes: Negative.    Respiratory: Negative.    Cardiovascular: Negative.    Gastrointestinal: Positive for diarrhea (has stress, then gets diarrhea).   Musculoskeletal: Positive for back pain and joint pain.   Skin: Negative.    Neurological: Negative.    Endo/Heme/Allergies: Negative.    Psychiatric/Behavioral: Negative.      PATIENT HISTORY:    Past Medical History:   Diagnosis Date    Allergy     " Angio-edema     Arthritis     Cataract     Cerebrovascular malformation     Colon polyps     Coronary artery disease     Diabetes mellitus, type 2     Diabetic peripheral neuropathy     GERD (gastroesophageal reflux disease)     Herpes infection     Hyperlipidemia     Hypertension     Hypothyroidism     Kidney stones     Nausea & vomiting 11/23/2015    Obesity, morbid     Ovarian cyst     Pyelonephritis, chronic     Seizure disorder, focal motor     Sleep apnea     Type II or unspecified type diabetes mellitus with neurological manifestations, uncontrolled     Urinary tract infection     Urticaria     Vaginal infection        Past Surgical History:   Procedure Laterality Date    CARPAL TUNNEL RELEASE Right 2014    COLONOSCOPY      CYST REMOVAL      EXTRACORPOREAL SHOCK WAVE LITHOTRIPSY  2002    HYSTERECTOMY  1984    THYROIDECTOMY  1977         TONSILLECTOMY, ADENOIDECTOMY      trigger finder       TUBAL LIGATION         Family History   Problem Relation Age of Onset    Cancer Father     Allergies Son     Skin cancer Mother     Macular degeneration Mother     Dementia Mother     No Known Problems Daughter     No Known Problems Daughter     No Known Problems Daughter     Glaucoma Maternal Aunt      Great Maternal Aunt       Social History    Marital status:      Occupational History     [employer]     Social History Main Topics    Smoking status: Never Smoker    Smokeless tobacco: Never Used    Alcohol use No    Drug use: No    Sexual activity: Not Currently       Allergies:  Sulfa (sulfonamide antibiotics); Ciprofloxacin; Januvia [sitagliptin]; Lisinopril; Lotensin [benazepril]; and Nexium [esomeprazole magnesium]    Medications:  Outpatient Encounter Prescriptions as of 4/20/2017   Medication Sig Dispense Refill    aspirin (ECOTRIN) 81 MG EC tablet Take 81 mg by mouth once daily.      azelastine (ASTELIN) 137 mcg (0.1 %) nasal spray 1 spray (137 mcg  "total) by Nasal route daily as needed for Rhinitis. 90 mL 1    biotin 10,000 mcg Cap Take by mouth.      blood sugar diagnostic (BLOOD GLUCOSE TEST) Strp 1 strip by Misc.(Non-Drug; Combo Route) route 2 (two) times daily. [employer] True Metrix test strips 300 strip 3    blood-glucose meter Misc [employer] True Metrix Air meter 1 each 0    calcium carbonate-vitamin D3 600 mg(1,500mg) -100 unit Cap Take 1 tablet by mouth once daily.      fluticasone (FLONASE) 50 mcg/actuation nasal spray 2 sprays by Each Nare route once daily. 16 g 12    glimepiride (AMARYL) 4 MG tablet Take 1 tablet (4 mg total) by mouth daily with breakfast. 90 tablet 3    hydrochlorothiazide (HYDRODIURIL) 25 MG tablet Take 1 tablet (25 mg total) by mouth once daily. 90 tablet 3    lancets 28 gauge Misc 1 lancet by Misc.(Non-Drug; Combo Route) route 2 (two) times daily. For True Metrix meter 300 each 1    LEVEMIR FLEXTOUCH 100 unit/mL (3 mL) InPn pen Inject 14 Units into the skin once daily.      levothyroxine (SYNTHROID) 75 MCG tablet TAKE 1 TABLET ONE TIME DAILY 90 tablet 0    liraglutide 0.6 mg/0.1 mL, 18 mg/3 mL, subq PNIJ (VICTOZA 2-TAWANA) 0.6 mg/0.1 mL (18 mg/3 mL) PnIj Inject 1.8 mg into the skin once daily. 9 mL 1    loratadine (CLARITIN) 10 mg tablet Take 10 mg by mouth once daily.      lovastatin (MEVACOR) 40 MG tablet TAKE 1 TABLET  NIGHTLY 90 tablet 0    magnesium oxide-Mg AA chelate (MAGNESIUM, AMINO ACID CHELATE,) 133 mg Tab Take by mouth as directed.       metformin (GLUCOPHAGE) 1000 MG tablet TAKE 1 TABLET TWICE DAILY WITH MEALS 180 tablet 0    pen needle, diabetic (BD ULTRA-FINE EDUARDO PEN NEEDLES) 32 gauge x 5/32" Ndle Use with victoza and levemir, 2 injections daily 200 each 3    UNABLE TO FIND Zatador eye drops for allergies      valacyclovir (VALTREX) 500 MG tablet TAKE 1 TABLET ONE TIME DAILY 90 tablet 1    amlodipine (NORVASC) 5 MG tablet Take 1 tablet (5 mg total) by mouth once daily. 90 tablet 1    [START ON " 4/21/2017] conjugated estrogens (PREMARIN) vaginal cream Place 0.5 g vaginally 3 (three) times a week. 30 g 3    diclofenac sodium (VOLTAREN) 1 % Gel Apply 2 g topically 4 (four) times daily. 1 Tube 2    econazole nitrate 1 % cream Apply topically once daily. 60 g 3    hydrocodone-acetaminophen 5-325mg (NORCO) 5-325 mg per tablet Take 1 tablet by mouth every 6 (six) hours as needed for Pain. 30 tablet 0    insulin detemir (LEVEMIR FLEXPEN) 100 unit/mL (3 mL) SubQ InPn pen Inject 14 Units into the skin every evening. 9 mL 3    omeprazole (PRILOSEC) 20 MG capsule TAKE 1 CAPSULE EVERY DAY AS NEEDED  FOR  GERD 90 capsule 1    valsartan (DIOVAN) 160 MG tablet Take 1 tablet (160 mg total) by mouth once daily. 90 tablet 3    [DISCONTINUED] nitrofurantoin (MACRODANTIN) 100 MG capsule Take 1 capsule (100 mg total) by mouth every 12 (twelve) hours. 14 capsule 0    [DISCONTINUED] potassium chloride SA (K-DUR,KLOR-CON) 20 MEQ tablet Take 1 tablet (20 mEq total) by mouth 2 (two) times daily. 60 tablet 0     Facility-Administered Encounter Medications as of 4/20/2017   Medication Dose Route Frequency Provider Last Rate Last Dose    Allergy Mix   Subcutaneous 1 time in Clinic/HOD Ailin Mills MD             PHYSICAL EXAMINATION:    The patient generally appears in good health, is appropriately interactive, and is in no apparent distress.    Skin: No lesions.    Mental: Cooperative with normal affect.    Neuro: Grossly intact.    HEENT: Normal. No evidence of lymphadenopathy.    Chest: Clear to ascultation bilaterally, normal inspiratory effort.    Heart: Regular rhythm.  No murmurs    Abdomen: BS active. Soft, non-tender. No masses or organomegaly. Bladder is not palpable. No evidence of flank discomfort. No evidence of inguinal hernia.    Extremities: No clubbing, cyanosis, or edema    Normal external female genitalia'  Grade II urogenital atrophy  Urethral meatus is normal  Urethra and bladder are nontender to  bimanual exam  Well supported anteriorly and posteriorly   Uterus and cervix are surgically absent  No adnexal masses  PVR by catheterization was 30 ml    LABS:    UA 1.010, pH 6, + leuk, + nitrite, otherwise, negative    IMPRESSION:    Encounter Diagnoses   Name Primary?    Recurrent UTI Yes    Vaginal atrophy        PLAN:    1.  The catheterized specimen was sent for culture  2.  Premarin cream applied by fingertip application three times a week  3.  May use olive oil as a vaginal lubricant.  I recommend keeping a separate bottle by the bedside to separate it from your salad oil so there is no contamination.  Do not use oil if using condoms.  Must use a water soluble lubricant.    4.  Probiotics recommended   5.  Follow up in 3 months.    Copy to:  Dr. Greg Lyons

## 2017-04-21 ENCOUNTER — PATIENT MESSAGE (OUTPATIENT)
Dept: NEPHROLOGY | Facility: CLINIC | Age: 67
End: 2017-04-21

## 2017-04-24 ENCOUNTER — OFFICE VISIT (OUTPATIENT)
Dept: INTERNAL MEDICINE | Facility: CLINIC | Age: 67
End: 2017-04-24
Payer: MEDICARE

## 2017-04-24 ENCOUNTER — TELEPHONE (OUTPATIENT)
Dept: UROLOGY | Facility: CLINIC | Age: 67
End: 2017-04-24

## 2017-04-24 VITALS
BODY MASS INDEX: 44.26 KG/M2 | SYSTOLIC BLOOD PRESSURE: 132 MMHG | WEIGHT: 240.5 LBS | HEIGHT: 62 IN | HEART RATE: 70 BPM | DIASTOLIC BLOOD PRESSURE: 66 MMHG

## 2017-04-24 DIAGNOSIS — N30.90 BLADDER INFECTION: Primary | ICD-10-CM

## 2017-04-24 DIAGNOSIS — E11.42 DIABETIC POLYNEUROPATHY ASSOCIATED WITH TYPE 2 DIABETES MELLITUS: ICD-10-CM

## 2017-04-24 DIAGNOSIS — E66.01 MORBID OBESITY WITH BMI OF 40.0-44.9, ADULT: ICD-10-CM

## 2017-04-24 DIAGNOSIS — Z11.59 NEED FOR HEPATITIS C SCREENING TEST: ICD-10-CM

## 2017-04-24 DIAGNOSIS — Z79.4 TYPE 2 DIABETES MELLITUS WITH DIABETIC POLYNEUROPATHY, WITH LONG-TERM CURRENT USE OF INSULIN: Primary | ICD-10-CM

## 2017-04-24 DIAGNOSIS — E11.59 HYPERTENSION ASSOCIATED WITH DIABETES: ICD-10-CM

## 2017-04-24 DIAGNOSIS — I15.2 HYPERTENSION ASSOCIATED WITH DIABETES: ICD-10-CM

## 2017-04-24 DIAGNOSIS — N39.0 RECURRENT UTI: ICD-10-CM

## 2017-04-24 DIAGNOSIS — E11.42 TYPE 2 DIABETES MELLITUS WITH DIABETIC POLYNEUROPATHY, WITH LONG-TERM CURRENT USE OF INSULIN: Primary | ICD-10-CM

## 2017-04-24 DIAGNOSIS — G47.30 SLEEP APNEA, UNSPECIFIED TYPE: ICD-10-CM

## 2017-04-24 DIAGNOSIS — E87.6 HYPOKALEMIA: ICD-10-CM

## 2017-04-24 LAB — BACTERIA UR CULT: NORMAL

## 2017-04-24 PROCEDURE — 3066F NEPHROPATHY DOC TX: CPT | Mod: S$GLB,,, | Performed by: INTERNAL MEDICINE

## 2017-04-24 PROCEDURE — 1160F RVW MEDS BY RX/DR IN RCRD: CPT | Mod: S$GLB,,, | Performed by: INTERNAL MEDICINE

## 2017-04-24 PROCEDURE — 3075F SYST BP GE 130 - 139MM HG: CPT | Mod: S$GLB,,, | Performed by: INTERNAL MEDICINE

## 2017-04-24 PROCEDURE — 3044F HG A1C LEVEL LT 7.0%: CPT | Mod: S$GLB,,, | Performed by: INTERNAL MEDICINE

## 2017-04-24 PROCEDURE — 99214 OFFICE O/P EST MOD 30 MIN: CPT | Mod: S$GLB,,, | Performed by: INTERNAL MEDICINE

## 2017-04-24 PROCEDURE — 3078F DIAST BP <80 MM HG: CPT | Mod: S$GLB,,, | Performed by: INTERNAL MEDICINE

## 2017-04-24 PROCEDURE — 99999 PR PBB SHADOW E&M-EST. PATIENT-LVL III: CPT | Mod: PBBFAC,,, | Performed by: INTERNAL MEDICINE

## 2017-04-24 RX ORDER — DOXYCYCLINE 100 MG/1
100 CAPSULE ORAL 2 TIMES DAILY
Qty: 14 CAPSULE | Refills: 0 | Status: SHIPPED | OUTPATIENT
Start: 2017-04-24 | End: 2017-05-01

## 2017-04-24 RX ORDER — GABAPENTIN 300 MG/1
300 CAPSULE ORAL NIGHTLY
Qty: 30 CAPSULE | Refills: 1 | Status: SHIPPED | OUTPATIENT
Start: 2017-04-24 | End: 2017-06-19 | Stop reason: SDUPTHER

## 2017-04-24 RX ORDER — DOXYCYCLINE 100 MG/1
100 CAPSULE ORAL 2 TIMES DAILY
Qty: 14 CAPSULE | Refills: 0 | Status: SHIPPED | OUTPATIENT
Start: 2017-04-24 | End: 2017-04-24 | Stop reason: SDUPTHER

## 2017-04-24 NOTE — MR AVS SNAPSHOT
[facility]   [place]  [address] [address]  Phone: [phone]  Fax: [phone]                  Vivienne Jose   2017 4:00 PM   Office Visit    Description:  Female : 1950   Provider:  Jay Jay Felton MD   Department:  [facility]           Reason for Visit     Diabetes           Diagnoses this Visit        Comments    Type 2 diabetes mellitus with diabetic polyneuropathy, with long-term current use of insulin    -  Primary continue current regimen and encouraged ADA diet; scheduled for eye exam     Hypertension associated with diabetes     continue current regimen and encouraged low Na diet and weight loss    Hypokalemia     completed potassium supplements from renal as per pt. and is scheduled to repeat potassium    Sleep apnea, unspecified type     continue CPAP nightly    Diabetic polyneuropathy associated with type 2 diabetes mellitus     start neurontin QHS    Recurrent UTI     start doxycycline as per urology and f/u urology who is managing    Morbid obesity with BMI of 40.0-44.9, adult     encouraged diet and explained risks     Need for hepatitis C screening test                To Do List           Future Appointments        Provider Department Dept Phone    2017 10:00 AM Lyndon Mathews OD [place] - Optometry [phone]    2017 11:00 AM LAB, [facility] [place] - Laboratory [phone]    2017 8:00 AM Jay Jay Felton MD [facility] [phone]    2017 9:00 AM MD Russell Esteves [facility] - Endo/Diab/Metab [phone]    2017 9:20 AM Patricia Cee MD [facility] - Urology 4th Floor [phone]      Goals (5 Years of Data)              Today    17    Blood Pressure <= 140/90   132/66  132/66  132/66    Notes - Note created  2016  1:27 PM by Maria Luisa Dior    It is important to consistently maintain a controlled blood pressure.      Exercise at least 150 minutes per week.            Maintain a low sodium diet           Take at least one BP reading per week at various times of the day           Weight (lb) < 106.6 kg (235 lb)   109.1 kg (240 lb 8.4 oz)        Notes - Note created  12/22/2016  2:41 PM by Maria Luisa Dior    Decrease weight by 5% to reduce cardiovascular risk factors        Follow-Up and Disposition     Return in about 4 months (around 8/24/2017).    Follow-up and Disposition History       These Medications        Disp Refills Start End    gabapentin (NEURONTIN) 300 MG capsule 30 capsule 1 4/24/2017 4/24/2018    Take 1 capsule (300 mg total) by mouth every evening. - Oral    Pharmacy: [id number] - [place]  [address] AT [facility] [place]  #: [phone]         [facility]     [facility] Care Line - 24/7 Assistance  Unless otherwise directed by your provider, please contact [facility], our nurse care line that is available for 24/7 assistance.     Registered nurses in the [facility] provide: appointment scheduling, clinical advisement, health education, and other advisory services.  Call: [phone] (toll free)               Medications           Message regarding Medications     Verify the changes and/or additions to your medication regime listed below are the same as discussed with your clinician today.  If any of these changes or additions are incorrect, please notify your healthcare provider.        START taking these NEW medications        Refills    gabapentin (NEURONTIN) 300 MG capsule 1    Sig: Take 1 capsule (300 mg total) by mouth every evening.    Class: Normal    Route: Oral           Verify that the below list of medications is an accurate representation of the medications you are currently taking.  If none reported, the list may be blank. If incorrect, please contact your healthcare provider. Carry this list with you in case of emergency.           Current Medications      amlodipine (NORVASC) 5 MG tablet Take 1 tablet (5 mg total) by mouth once daily.    aspirin (ECOTRIN) 81 MG EC tablet Take 81 mg by mouth once daily.    azelastine (ASTELIN) 137 mcg (0.1 %) nasal spray 1 spray (137 mcg total) by Nasal route daily as needed for Rhinitis.    biotin 10,000 mcg Cap Take by mouth.    blood sugar diagnostic (BLOOD GLUCOSE TEST) Strp 1 strip by Misc.(Non-Drug; Combo Route) route 2 (two) times daily. [employer] True Metrix test strips    blood-glucose meter Misc [employer] True Metrix Air meter    calcium carbonate-vitamin D3 600 mg(1,500mg) -100 unit Cap Take 1 tablet by mouth once daily.    conjugated estrogens (PREMARIN) vaginal cream Place 0.5 g vaginally 3 (three) times a week.    doxycycline (MONODOX) 100 MG capsule Take 1 capsule (100 mg total) by mouth 2 (two) times daily. Use sunscreen or cover and continue 1 week after completed.    fluticasone (FLONASE) 50 mcg/actuation nasal spray 2 sprays by Each Nare route once daily.    glimepiride (AMARYL) 4 MG tablet Take 1 tablet (4 mg total) by mouth daily with breakfast.    hydrochlorothiazide (HYDRODIURIL) 25 MG tablet Take 1 tablet (25 mg total) by mouth once daily.    hydrocodone-acetaminophen 5-325mg (NORCO) 5-325 mg per tablet Take 1 tablet by mouth every 6 (six) hours as needed for Pain.    lancets 28 gauge Misc 1 lancet by Misc.(Non-Drug; Combo Route) route 2 (two) times daily. For True Metrix meter    LEVEMIR FLEXTOUCH 100 unit/mL (3 mL) InPn pen Inject 14 Units into the skin once daily.    levothyroxine (SYNTHROID) 75 MCG tablet TAKE 1 TABLET ONE TIME DAILY    liraglutide 0.6 mg/0.1 mL, 18 mg/3 mL, subq PNIJ (VICTOZA 2-TAWANA) 0.6 mg/0.1 mL (18 mg/3 mL) PnIj Inject 1.8 mg into the skin once daily.    loratadine (CLARITIN) 10 mg tablet Take 10 mg by mouth once daily.    lovastatin (MEVACOR) 40 MG tablet TAKE 1 TABLET  NIGHTLY    magnesium oxide-Mg AA chelate (MAGNESIUM, AMINO ACID CHELATE,) 133 mg Tab Take by mouth as directed.      "metformin (GLUCOPHAGE) 1000 MG tablet TAKE 1 TABLET TWICE DAILY WITH MEALS    omeprazole (PRILOSEC) 20 MG capsule TAKE 1 CAPSULE EVERY DAY AS NEEDED  FOR  GERD    pen needle, diabetic (BD ULTRA-FINE EDUARDO PEN NEEDLES) 32 gauge x 5/32" Ndle Use with victoza and levemir, 2 injections daily    UNABLE TO FIND Zatador eye drops for allergies    valacyclovir (VALTREX) 500 MG tablet TAKE 1 TABLET ONE TIME DAILY    valsartan (DIOVAN) 160 MG tablet Take 1 tablet (160 mg total) by mouth once daily.    diclofenac sodium (VOLTAREN) 1 % Gel Apply 2 g topically 4 (four) times daily.    econazole nitrate 1 % cream Apply topically once daily.    gabapentin (NEURONTIN) 300 MG capsule Take 1 capsule (300 mg total) by mouth every evening.           Clinical Reference Information           Your Vitals Were     BP Pulse Height Weight BMI    132/66 (BP Location: Right arm, Patient Position: Sitting, BP Method: Manual) 70 5' 2" (1.575 m) 109.1 kg (240 lb 8.4 oz) 43.99 kg/m2      Blood Pressure          Most Recent Value    BP  132/66      Allergies as of 4/24/2017     Sulfa (Sulfonamide Antibiotics)    Ciprofloxacin    Januvia [Sitagliptin]    Lisinopril    Lotensin [Benazepril]    Nexium [Esomeprazole Magnesium]      Immunizations Administered on Date of Encounter - 4/24/2017     None      Orders Placed During Today's Visit     Future Labs/Procedures Expected by Expires    CBC auto differential  7/24/2017 12/24/2017    Comprehensive metabolic panel  7/24/2017 12/24/2017    Hemoglobin A1c  7/24/2017 12/24/2017    Hepatitis C antibody  7/24/2017 12/24/2017      Language Assistance Services     ATTENTION: Language assistance services are available, free of charge. Please call [phone].      ATENCIÓN: Si habla jose luisañol, tiene a carter disposición servicios gratuitos de asistencia lingüística. Llame al [phone].     CHÚ Ý: N?u b?n nói Ti?ng Vi?t, có các d?ch v? h? tr? ngôn ng? mi?n phí dành cho b?n. G?i s? [phone].         " [facility] complies with applicable Federal civil rights laws and does not discriminate on the basis of race, color, national origin, age, disability, or sex.

## 2017-04-24 NOTE — PROGRESS NOTES
Subjective:       Patient ID: Vivienne Jose is a 66 y.o. female.    Chief Complaint: Diabetes    HPI  Pt. Here for f/u for DM and HTN; I reviewed labs dated 4/12/17; cholesterol is WNL; diabetes is well controlled; potassium is 3.2; she states pt. Was started on potassium by renal and has completed and she is scheduled for repeat potassium; she was also asked to increase potassium rich foods; she has lost weight; she is using CPAP nightly; she takes asa 81 mg QD; she is scheduled for eye exam; she was placed on doxycycline for recurrent UTI by urology; pt. Reports intermitent tingling and numbness to B/L feet for past 6 months  Review of Systems   Constitutional: Negative for chills, fatigue and fever.   HENT: Negative for congestion, rhinorrhea and sore throat.    Respiratory: Negative for cough, shortness of breath and wheezing.    Cardiovascular: Negative for chest pain.   Gastrointestinal: Negative for abdominal pain, nausea and vomiting.   Genitourinary: Negative for dysuria.   Musculoskeletal: Negative for arthralgias.   Skin: Negative for rash.   Neurological: Positive for numbness. Negative for dizziness and headaches.        Intermittent numbness and tingling B/L feet and lower legs   Psychiatric/Behavioral: Negative for sleep disturbance. The patient is not nervous/anxious.        Objective:      Physical Exam   Constitutional: She is oriented to person, place, and time.   Morbid obesity     Eyes: EOM are normal.   Neck: Normal range of motion.   Cardiovascular: Normal rate, regular rhythm and normal heart sounds.    Pulmonary/Chest: Effort normal and breath sounds normal.   Abdominal: Soft. There is no tenderness. There is no rebound and no guarding.   Musculoskeletal: Normal range of motion.   Neurological: She is alert and oriented to person, place, and time.   Skin: No rash noted.   Diabetic foot exam: good sensation B/L feet and no ulcers B/L        Assessment:       1. Type 2 diabetes mellitus with  diabetic polyneuropathy, with long-term current use of insulin Well controlled   2. Hypertension associated with diabetes Well controlled   3. Hypokalemia Active   4. Sleep apnea, unspecified type Well controlled   5. Diabetic polyneuropathy associated with type 2 diabetes mellitus Sub-optimally controlled   6. Recurrent UTI Active   7. Morbid obesity with BMI of 40.0-44.9, adult Sub-optimally controlled   8. Need for hepatitis C screening test Active       Plan:         Type 2 diabetes mellitus with diabetic polyneuropathy, with long-term current use of insulin  Comments:  continue current regimen and encouraged ADA diet; scheduled for eye exam   Orders:  -     CBC auto differential; Future; Expected date: 7/24/17  -     Comprehensive metabolic panel; Future; Expected date: 7/24/17  -     Hemoglobin A1c; Future; Expected date: 7/24/17    Hypertension associated with diabetes  Comments:  continue current regimen and encouraged low Na diet and weight loss  Orders:  -     CBC auto differential; Future; Expected date: 7/24/17  -     Comprehensive metabolic panel; Future; Expected date: 7/24/17    Hypokalemia  Comments:  completed potassium supplements from renal as per pt. and is scheduled to repeat potassium  Orders:  -     Comprehensive metabolic panel; Future; Expected date: 7/24/17    Sleep apnea, unspecified type  Comments:  continue CPAP nightly    Diabetic polyneuropathy associated with type 2 diabetes mellitus  Comments:  start neurontin QHS  Orders:  -     gabapentin (NEURONTIN) 300 MG capsule; Take 1 capsule (300 mg total) by mouth every evening.  Dispense: 30 capsule; Refill: 1    Recurrent UTI  Comments:  start doxycycline as per urology and f/u urology who is managing    Morbid obesity with BMI of 40.0-44.9, adult  Comments:  encouraged diet and explained risks     Need for hepatitis C screening test  -     Hepatitis C antibody; Future; Expected date: 7/24/17

## 2017-04-27 ENCOUNTER — PATIENT OUTREACH (OUTPATIENT)
Dept: OTHER | Facility: OTHER | Age: 67
End: 2017-04-27
Payer: MEDICARE

## 2017-04-29 DIAGNOSIS — E78.5 HYPERLIPIDEMIA: ICD-10-CM

## 2017-04-29 RX ORDER — LOVASTATIN 40 MG/1
TABLET ORAL
Qty: 90 TABLET | Refills: 1 | Status: SHIPPED | OUTPATIENT
Start: 2017-04-29 | End: 2017-10-29 | Stop reason: SDUPTHER

## 2017-05-01 DIAGNOSIS — E78.5 HYPERLIPIDEMIA: ICD-10-CM

## 2017-05-01 RX ORDER — LOVASTATIN 40 MG/1
40 TABLET ORAL NIGHTLY
Qty: 90 TABLET | Refills: 0 | OUTPATIENT
Start: 2017-05-01

## 2017-05-03 ENCOUNTER — PATIENT OUTREACH (OUTPATIENT)
Dept: OTHER | Facility: OTHER | Age: 67
End: 2017-05-03
Payer: MEDICARE

## 2017-05-03 NOTE — PROGRESS NOTES
Last 5 Patient Entered Readings                                                               Current 30 Day Average: 142/72     Recent Readings 5/2/2017 5/1/2017 4/30/2017 4/29/2017 4/28/2017    Systolic BP (mmHg) 146 141 147 131 151    Diastolic BP (mmHg) 71 73 77 72 76    Pulse 64 74 79 66 69        Follow up with Mrs. Vivienne Jose completed. Mrs. Jose has had a very busy April. Her daughter recently relocated to [place] and helped her pack for two weeks. Her mother also lives with her and had two falls, within a few weeks of each other, breaking her should and three ribs.Mrs. Jose reports that she still has not started taking valsartan. Once she finished the losartan she will begin valsartan. Patient did not have any further questions or concerns. I will follow up in a few weeks to see how she is doing and progressing.

## 2017-05-04 ENCOUNTER — OFFICE VISIT (OUTPATIENT)
Dept: OPTOMETRY | Facility: CLINIC | Age: 67
End: 2017-05-04
Payer: MEDICARE

## 2017-05-04 ENCOUNTER — LAB VISIT (OUTPATIENT)
Dept: LAB | Facility: HOSPITAL | Age: 67
End: 2017-05-04
Attending: INTERNAL MEDICINE
Payer: MEDICARE

## 2017-05-04 DIAGNOSIS — H25.13 NUCLEAR SCLEROSIS, BILATERAL: ICD-10-CM

## 2017-05-04 DIAGNOSIS — H52.13 MYOPIA WITH ASTIGMATISM AND PRESBYOPIA, BILATERAL: ICD-10-CM

## 2017-05-04 DIAGNOSIS — N20.0 KIDNEY STONES: ICD-10-CM

## 2017-05-04 DIAGNOSIS — H52.4 MYOPIA WITH ASTIGMATISM AND PRESBYOPIA, BILATERAL: ICD-10-CM

## 2017-05-04 DIAGNOSIS — E11.9 TYPE 2 DIABETES MELLITUS WITHOUT RETINOPATHY: Primary | ICD-10-CM

## 2017-05-04 DIAGNOSIS — H52.203 MYOPIA WITH ASTIGMATISM AND PRESBYOPIA, BILATERAL: ICD-10-CM

## 2017-05-04 DIAGNOSIS — I10 ESSENTIAL HYPERTENSION: ICD-10-CM

## 2017-05-04 LAB — POTASSIUM SERPL-SCNC: 3.7 MMOL/L

## 2017-05-04 PROCEDURE — 84244 ASSAY OF RENIN: CPT

## 2017-05-04 PROCEDURE — 82088 ASSAY OF ALDOSTERONE: CPT

## 2017-05-04 PROCEDURE — 84132 ASSAY OF SERUM POTASSIUM: CPT

## 2017-05-04 PROCEDURE — 36415 COLL VENOUS BLD VENIPUNCTURE: CPT | Mod: PO

## 2017-05-04 PROCEDURE — 99999 PR PBB SHADOW E&M-EST. PATIENT-LVL II: CPT | Mod: PBBFAC,,, | Performed by: OPTOMETRIST

## 2017-05-04 PROCEDURE — 92014 COMPRE OPH EXAM EST PT 1/>: CPT | Mod: S$GLB,,, | Performed by: OPTOMETRIST

## 2017-05-04 NOTE — PROGRESS NOTES
HPI     Diabetic eye exam. Last  this AM. Hasn't noticed any vision change.   Glasses about 8 yrs old, didn't change last year.    Hemoglobin A1C       Date                     Value               Ref Range             Status                04/12/2017               6.9 (H)             4.5 - 6.2 %           Final              Comment:    According to ADA guidelines, hemoglobin A1C <7.0% represents  optimal   control in non-pregnant diabetic patients.  Different  metrics may apply   to specific populations.   Standards of Medical Care in Diabetes -   2016.  For the purpose of screening for the presence of diabetes:  <5.7%       Consistent with the absence of diabetes  5.7-6.4%  Consistent with   increasing risk for diabetes   (prediabetes)  >or=6.5%  Consistent with   diabetes  Currently no consensus exists for use of hemoglobin A1C  for   diagnosis of diabetes for children.         12/01/2016               7.0 (H)             4.5 - 6.2 %           Final              Comment:    According to ADA guidelines, hemoglobin A1C <7.0% represents  optimal   control in non-pregnant diabetic patients.  Different  metrics may apply   to specific populations.   Standards of Medical Care in Diabetes -   2016.  For the purpose of screening for the presence of diabetes:  <5.7%       Consistent with the absence of diabetes  5.7-6.4%  Consistent with   increasing risk for diabetes   (prediabetes)  >or=6.5%  Consistent with   diabetes  Currently no consensus exists for use of hemoglobin A1C  for   diagnosis of diabetes for children.         06/13/2016               7.5 (H)             4.5 - 6.2 %           Final            ----------       Last edited by Lyndon Mathews, OD on 5/4/2017 10:37 AM.     ROS     Negative for: Constitutional, Gastrointestinal, Neurological, Skin,   Genitourinary, Musculoskeletal, HENT, Endocrine, Cardiovascular, Eyes,   Respiratory, Psychiatric, Allergic/Imm, Heme/Lymph    Last edited by Lyndon  JUNG Mathews, OD on 5/4/2017 10:27 AM. (History)        Assessment /Plan     For exam results, see Encounter Report.    Type 2 diabetes mellitus without retinopathy    Nuclear sclerosis, bilateral    Myopia with astigmatism and presbyopia, bilateral      1. No diabetic retinopathy, no csme. Return in 1 year for dilated eye exam.  2. Educated pt on presence of cataracts and effects on vision. No surgery at this time. Recheck in one year.  3. Spec Rx given. Different lens options discussed with patient. RTC 1 year full exam.

## 2017-05-06 LAB — RENIN PLAS-CCNC: 1.2 NG/ML/H

## 2017-05-08 LAB — ALDOST SERPL-MCNC: 8.8 NG/DL

## 2017-05-08 NOTE — PROGRESS NOTES
Last 5 Patient Entered Readings                                                               Current 30 Day Average: 142/72     Recent Readings 5/7/2017 5/7/2017 5/6/2017 5/5/2017 5/4/2017    Systolic BP (mmHg) 152 163 142 144 148    Diastolic BP (mmHg) 77 76 67 71 69    Pulse 74 76 70 64 71        Ms. Jose's BP remains above goal. She has not started valsartan yet. She is finishing up her losartan/HCTZ before starting valsartan. She will start valsartan around 5/31/17. Will follow up once she has been on valsartan for a couple weeks.     Patient's BP is not at goal. Patient denies any symptoms.      Current HTN regimen:  Hypertension Medications             amlodipine (NORVASC) 5 MG tablet Take 1 tablet (5 mg total) by mouth once daily.    hydrochlorothiazide (HYDRODIURIL) 25 MG tablet Take 1 tablet (25 mg total) by mouth once daily.    valsartan (DIOVAN) 160 MG tablet Take 1 tablet (160 mg total) by mouth once daily.        Will continue to monitor regularly. Will follow up in 4-6 weeks, sooner if BP begins to trend upward or downward.    Patient has my contact information and knows to call with any concerns or clinical changes.

## 2017-05-11 ENCOUNTER — PATIENT MESSAGE (OUTPATIENT)
Dept: SLEEP MEDICINE | Facility: CLINIC | Age: 67
End: 2017-05-11

## 2017-05-11 DIAGNOSIS — G47.33 OSA (OBSTRUCTIVE SLEEP APNEA): Primary | ICD-10-CM

## 2017-05-12 NOTE — TELEPHONE ENCOUNTER
I'm trying to get new head gear for my CPAP but they need a new prescription. Could you please send a new script to [facility] at [phone]. Thank you

## 2017-05-14 ENCOUNTER — PATIENT MESSAGE (OUTPATIENT)
Dept: DERMATOLOGY | Facility: CLINIC | Age: 67
End: 2017-05-14

## 2017-05-14 ENCOUNTER — PATIENT MESSAGE (OUTPATIENT)
Dept: ALLERGY | Facility: CLINIC | Age: 67
End: 2017-05-14

## 2017-05-15 ENCOUNTER — TELEPHONE (OUTPATIENT)
Dept: ORTHOPEDICS | Facility: CLINIC | Age: 67
End: 2017-05-15

## 2017-05-15 DIAGNOSIS — L30.4 INTERTRIGO: ICD-10-CM

## 2017-05-15 RX ORDER — ECONAZOLE NITRATE 10 MG/G
CREAM TOPICAL DAILY
Qty: 60 G | Refills: 3 | Status: SHIPPED | OUTPATIENT
Start: 2017-05-15 | End: 2017-10-05

## 2017-05-15 NOTE — TELEPHONE ENCOUNTER
I spoke with the patient and informed her that per Dr. Harrison she should see a knee doctor. She understood.

## 2017-05-15 NOTE — TELEPHONE ENCOUNTER
----- Message from Debbie Mistry sent at 5/15/2017 12:39 PM CDT -----  No. [phone]   Patient has an appointment today at 3:00.   Did someone call to reschedule it.   Please call.

## 2017-05-16 ENCOUNTER — HOSPITAL ENCOUNTER (OUTPATIENT)
Dept: RADIOLOGY | Facility: HOSPITAL | Age: 67
Discharge: HOME | End: 2017-05-16
Attending: ORTHOPAEDIC SURGERY
Payer: MEDICARE

## 2017-05-16 ENCOUNTER — PATIENT MESSAGE (OUTPATIENT)
Dept: ENDOCRINOLOGY | Facility: CLINIC | Age: 67
End: 2017-05-16

## 2017-05-16 ENCOUNTER — OFFICE VISIT (OUTPATIENT)
Dept: ORTHOPEDICS | Facility: CLINIC | Age: 67
End: 2017-05-16
Payer: MEDICARE

## 2017-05-16 VITALS — BODY MASS INDEX: 44.26 KG/M2 | WEIGHT: 240.5 LBS | HEIGHT: 62 IN | RESPIRATION RATE: 16 BRPM

## 2017-05-16 DIAGNOSIS — M25.561 ACUTE BILATERAL KNEE PAIN: ICD-10-CM

## 2017-05-16 DIAGNOSIS — M17.11 PRIMARY OSTEOARTHRITIS OF RIGHT KNEE: Primary | ICD-10-CM

## 2017-05-16 DIAGNOSIS — M25.561 ACUTE BILATERAL KNEE PAIN: Primary | ICD-10-CM

## 2017-05-16 DIAGNOSIS — M25.562 ACUTE BILATERAL KNEE PAIN: ICD-10-CM

## 2017-05-16 DIAGNOSIS — M25.562 ACUTE BILATERAL KNEE PAIN: Primary | ICD-10-CM

## 2017-05-16 PROCEDURE — 99999 PR PBB SHADOW E&M-EST. PATIENT-LVL III: CPT | Mod: PBBFAC,,, | Performed by: PHYSICIAN ASSISTANT

## 2017-05-16 PROCEDURE — 1160F RVW MEDS BY RX/DR IN RCRD: CPT | Mod: S$GLB,,, | Performed by: PHYSICIAN ASSISTANT

## 2017-05-16 PROCEDURE — 73564 X-RAY EXAM KNEE 4 OR MORE: CPT | Mod: 26,50,, | Performed by: RADIOLOGY

## 2017-05-16 PROCEDURE — 99214 OFFICE O/P EST MOD 30 MIN: CPT | Mod: 25,S$GLB,, | Performed by: PHYSICIAN ASSISTANT

## 2017-05-16 PROCEDURE — 20610 DRAIN/INJ JOINT/BURSA W/O US: CPT | Mod: RT,S$GLB,, | Performed by: PHYSICIAN ASSISTANT

## 2017-05-16 RX ADMIN — TRIAMCINOLONE ACETONIDE 40 MG: 40 INJECTION, SUSPENSION INTRA-ARTICULAR; INTRAMUSCULAR at 07:05

## 2017-05-17 RX ORDER — DICLOFENAC SODIUM 10 MG/G
2 GEL TOPICAL 4 TIMES DAILY
Qty: 3 TUBE | Refills: 3 | Status: SHIPPED | OUTPATIENT
Start: 2017-05-17 | End: 2019-04-30

## 2017-05-17 RX ORDER — TRIAMCINOLONE ACETONIDE 40 MG/ML
40 INJECTION, SUSPENSION INTRA-ARTICULAR; INTRAMUSCULAR
Status: COMPLETED | OUTPATIENT
Start: 2017-05-17 | End: 2017-05-16

## 2017-05-17 NOTE — PROGRESS NOTES
Subjective:      Patient ID: Vivienne Jose is a 66 y.o. female.    Chief Complaint: Pain of the Left Knee and Pain of the Right Knee      HPI:   Vivienne Jose is a 66 y.o. year old female here with a history of intermittent right knee pain which started about 2 weeks ago.  There is not a history of trauma.  The pain is located in the medial aspect of the knee.  The pain is described as aching and sharp, 2-8/10.  There is not radiation.  There is not catching or locking.  Aggravating factors include any weight bearing, going up and down stairs and rising after sitting.  Associated symptoms include knee giving out.  There is not numbness or tingling of the lower extremity.  There is not back pain. Previous treatments include voltaren gel which have provided minimal relief.  There is not a history of previous injury or surgery to the knee.  The patient does not use an assistive device.  She had an injection about a year ago with great relief.      Past Medical History:   Diagnosis Date    Allergy     Angio-edema     Arthritis     Cataract     Cerebrovascular malformation     Colon polyps     Coronary artery disease     Diabetes mellitus, type 2     Diabetic peripheral neuropathy     GERD (gastroesophageal reflux disease)     Herpes infection     Hyperlipidemia     Hypertension     Hypothyroidism     Kidney stones     Nausea & vomiting 11/23/2015    Obesity, morbid     Ovarian cyst     Pyelonephritis, chronic     Seizure disorder, focal motor     Sleep apnea     Type II or unspecified type diabetes mellitus with neurological manifestations, uncontrolled     Urinary tract infection     Urticaria     Vaginal infection        Past Surgical History:   Procedure Laterality Date    CARPAL TUNNEL RELEASE Right 2014    COLONOSCOPY      CYST REMOVAL      EXTRACORPOREAL SHOCK WAVE LITHOTRIPSY  2002    HYSTERECTOMY  1984    THYROIDECTOMY  1977         TONSILLECTOMY, ADENOIDECTOMY      trigger  finder       TUBAL LIGATION         Family History   Problem Relation Age of Onset    Cancer Father     Allergies Son     Skin cancer Mother     Macular degeneration Mother     Dementia Mother     No Known Problems Daughter     No Known Problems Daughter     No Known Problems Daughter     Glaucoma Maternal Aunt      Great Maternal Aunt    Amblyopia Neg Hx     Cataracts Neg Hx     Retinal detachment Neg Hx     Strabismus Neg Hx     Diabetes Neg Hx     Hypertension Neg Hx     Stroke Neg Hx     Thyroid disease Neg Hx     Kidney disease Neg Hx        Review of patient's allergies indicates:   Allergen Reactions    Sulfa (sulfonamide antibiotics) Nausea Only    Ciprofloxacin     Januvia [sitagliptin]      abd pain    Lisinopril     Lotensin [benazepril]     Nexium [esomeprazole magnesium] Other (See Comments)     Gas       Current Outpatient Prescriptions on File Prior to Visit   Medication Sig Dispense Refill    amlodipine (NORVASC) 5 MG tablet Take 1 tablet (5 mg total) by mouth once daily. 90 tablet 1    aspirin (ECOTRIN) 81 MG EC tablet Take 81 mg by mouth once daily.      azelastine (ASTELIN) 137 mcg (0.1 %) nasal spray 1 spray (137 mcg total) by Nasal route daily as needed for Rhinitis. 90 mL 1    biotin 10,000 mcg Cap Take by mouth.      blood sugar diagnostic (BLOOD GLUCOSE TEST) Strp 1 strip by Misc.(Non-Drug; Combo Route) route 2 (two) times daily. [employer] True Metrix test strips 300 strip 3    blood-glucose meter Misc [employer] True Metrix Air meter 1 each 0    calcium carbonate-vitamin D3 600 mg(1,500mg) -100 unit Cap Take 1 tablet by mouth once daily.      conjugated estrogens (PREMARIN) vaginal cream Place 0.5 g vaginally 3 (three) times a week. 30 g 3    econazole nitrate 1 % cream Apply topically once daily. 60 g 3    fluticasone (FLONASE) 50 mcg/actuation nasal spray 2 sprays by Each Nare route once daily. 16 g 12    gabapentin (NEURONTIN) 300 MG capsule Take 1 capsule  "(300 mg total) by mouth every evening. 30 capsule 1    glimepiride (AMARYL) 4 MG tablet Take 1 tablet (4 mg total) by mouth daily with breakfast. 90 tablet 3    hydrochlorothiazide (HYDRODIURIL) 25 MG tablet Take 1 tablet (25 mg total) by mouth once daily. 90 tablet 3    hydrocodone-acetaminophen 5-325mg (NORCO) 5-325 mg per tablet Take 1 tablet by mouth every 6 (six) hours as needed for Pain. 30 tablet 0    lancets 28 gauge Misc 1 lancet by Misc.(Non-Drug; Combo Route) route 2 (two) times daily. For True Metrix meter 300 each 1    LEVEMIR FLEXTOUCH 100 unit/mL (3 mL) InPn pen Inject 14 Units into the skin once daily.      levothyroxine (SYNTHROID) 75 MCG tablet TAKE 1 TABLET ONE TIME DAILY 90 tablet 0    liraglutide 0.6 mg/0.1 mL, 18 mg/3 mL, subq PNIJ (VICTOZA 2-TAWANA) 0.6 mg/0.1 mL (18 mg/3 mL) PnIj Inject 1.8 mg into the skin once daily. 9 mL 1    loratadine (CLARITIN) 10 mg tablet Take 10 mg by mouth once daily.      lovastatin (MEVACOR) 40 MG tablet TAKE 1 TABLET  NIGHTLY 90 tablet 1    magnesium oxide-Mg AA chelate (MAGNESIUM, AMINO ACID CHELATE,) 133 mg Tab Take by mouth as directed.       metformin (GLUCOPHAGE) 1000 MG tablet TAKE 1 TABLET TWICE DAILY WITH MEALS 180 tablet 0    omeprazole (PRILOSEC) 20 MG capsule TAKE 1 CAPSULE EVERY DAY AS NEEDED  FOR  GERD 90 capsule 1    pen needle, diabetic (BD ULTRA-FINE EDUARDO PEN NEEDLES) 32 gauge x 5/32" Ndle Use with victoza and levemir, 2 injections daily 200 each 3    UNABLE TO FIND Zatador eye drops for allergies      valacyclovir (VALTREX) 500 MG tablet TAKE 1 TABLET ONE TIME DAILY 90 tablet 1    valsartan (DIOVAN) 160 MG tablet Take 1 tablet (160 mg total) by mouth once daily. 90 tablet 3    [DISCONTINUED] diclofenac sodium (VOLTAREN) 1 % Gel Apply 2 g topically 4 (four) times daily. 1 Tube 2     Current Facility-Administered Medications on File Prior to Visit   Medication Dose Route Frequency Provider Last Rate Last Dose    Allergy Mix   " Subcutaneous 1 time in Clinic/HOD Ailin Mills MD           Review of Systems   Constitution: Negative for chills, fever and weakness.   Cardiovascular: Negative for chest pain.   Respiratory: Negative for cough and shortness of breath.    Hematologic/Lymphatic: Negative for bleeding problem. Does not bruise/bleed easily.   Skin: Negative for color change, itching and poor wound healing.   Musculoskeletal: Positive for joint pain (right knee).   Gastrointestinal: Negative for heartburn.   Neurological: Negative for dizziness, focal weakness, numbness, paresthesias and sensory change.   Psychiatric/Behavioral: The patient is not nervous/anxious.    Allergic/Immunologic: Negative for environmental allergies and persistent infections.         Objective:      General    Vitals reviewed.  Constitutional: She is oriented to person, place, and time. She appears well-developed and well-nourished. No distress.   HENT:   Head: Atraumatic.   Neck: No tracheal deviation present.   Cardiovascular: Normal rate and intact distal pulses.    Pulmonary/Chest: Effort normal. No respiratory distress.   Neurological: She is alert and oriented to person, place, and time. She has normal reflexes. She displays normal reflexes. She exhibits normal muscle tone. Coordination normal.   Psychiatric: She has a normal mood and affect. Her behavior is normal.             General :   alert, appears stated age and cooperative   Gait: Normal. The patient can bear weight on the injured extremity.   Right Lower Extremity      Hip Palpation:  no tenderness over the greater  trochanter   Hip ROM: 100% of normal    Inspection: There is no misalignment, asymmetry, crepitation, masses or deformities noted.    Skin:  no laceration, abrasion, ecchymosis, other recent injury.   Knee Effusion:  0-1+   Ecchymosis:  none   Knee ROM:  0 to 120 degrees with subpatellar   crepitance.   Patella:  Patella does track normally.  Patellar apprehension test:  negative  Patellar compression test: negative   Tenderness: medial joint line and medial facet of the patella   Stability:  Lachman's test: negative  Posterior drawer: negative  Medial collateral ligament: negative  Lateral collateral ligament: negative     Strength:  5/5 strength in all muscle groups of the bilateral lower extremities.       Geno's Test:  Deferred   Sensation:   intact to light touch   Pulses: normal DP and PT pulses       Imaging:  X-rays of the bilateral knee were obtained today and personally reviewed by me.  They are negative for fractures or dislocations.  Soft tissues are unremarkable.  Joints are well maintained, bony contours are intact and there are no acute abnormalities.  There is mild medial compartment narrowing on the right.       Assessment:       1. Primary osteoarthritis of right knee          Plan:       Orders Placed This Encounter    triamcinolone acetonide injection 40 mg    diclofenac sodium (VOLTAREN) 1 % Gel       All treatment options were discussed and the patient has decided to try a cortisone injection in the right knee today.  Refill voltaren gel given today.  Follow up as needed.    Return to the clinic with any new or worsening symptoms, or if symptoms persist.      Follow-up: PRN.  If there are any questions prior to this, the patient was instructed to contact the office.          PROCEDURE:  I have explained the risks, benefits, and alternatives of the procedure in detail.  The patient voices understanding and all questions have been answered.  The patient agrees to proceed as planned. So after I performed a sterile prep of the skin in the normal fashion the right knee is injected using a 22 gauge needle from the anterolateral approach with a combination of 4cc 1% plain lidocaine and 40 mg of kenalog.  The patient is cautioned an immediate relief of pain is secondary to the local anesthetic and will be temporary.  After the anesthetic wears off there may be a  increase in pain that may last for a few hours or a few days and they should use ice to help alleviate this flair up of pain.

## 2017-05-21 DIAGNOSIS — E03.9 HYPOTHYROIDISM, UNSPECIFIED TYPE: ICD-10-CM

## 2017-05-22 RX ORDER — LEVOTHYROXINE SODIUM 75 UG/1
75 TABLET ORAL DAILY
Qty: 90 TABLET | Refills: 1 | Status: SHIPPED | OUTPATIENT
Start: 2017-05-22 | End: 2017-11-05 | Stop reason: SDUPTHER

## 2017-05-24 ENCOUNTER — PATIENT OUTREACH (OUTPATIENT)
Dept: OTHER | Facility: OTHER | Age: 67
End: 2017-05-24
Payer: MEDICARE

## 2017-05-24 NOTE — PROGRESS NOTES
Last 5 Patient Entered Readings                                                               Current 30 Day Average: 145/72     Recent Readings 5/23/2017 5/22/2017 5/21/2017 5/20/2017 5/19/2017    Systolic BP (mmHg) 143 149 137 154 144    Diastolic BP (mmHg) 71 73 66 79 80    Pulse 71 68 66 74 62        Follow up with Mrs. Vivienne Jose completed. Mrs. Jose is doing well. She attributes elevated BP to knee pain. She also received a Cortizone shot last week. Mrs. Jose knows that she needs to make additional changes to her diet and exercise routine. She reports staying active, but knows that increasing physical activity will help with better BP control and diabetes management. She monitors her sodium intake, but is still making changes to her diet. Mrs. Jose also has not started valsartan yet. She plans on starting it within the next two weeks. Patient did not have any further questions or concerns. I will follow up in a few weeks to see how she is doing and progressing.

## 2017-06-08 ENCOUNTER — PATIENT MESSAGE (OUTPATIENT)
Dept: ENDOCRINOLOGY | Facility: CLINIC | Age: 67
End: 2017-06-08

## 2017-06-08 ENCOUNTER — OFFICE VISIT (OUTPATIENT)
Dept: INTERNAL MEDICINE | Facility: CLINIC | Age: 67
End: 2017-06-08
Payer: MEDICARE

## 2017-06-08 VITALS
BODY MASS INDEX: 45.15 KG/M2 | OXYGEN SATURATION: 95 % | HEART RATE: 80 BPM | SYSTOLIC BLOOD PRESSURE: 154 MMHG | WEIGHT: 245.38 LBS | HEIGHT: 62 IN | DIASTOLIC BLOOD PRESSURE: 68 MMHG

## 2017-06-08 DIAGNOSIS — Z11.59 NEED FOR HEPATITIS C SCREENING TEST: ICD-10-CM

## 2017-06-08 DIAGNOSIS — I15.2 HYPERTENSION ASSOCIATED WITH DIABETES: Primary | ICD-10-CM

## 2017-06-08 DIAGNOSIS — E78.5 HYPERLIPIDEMIA, UNSPECIFIED HYPERLIPIDEMIA TYPE: ICD-10-CM

## 2017-06-08 DIAGNOSIS — E66.01 MORBID OBESITY WITH BMI OF 40.0-44.9, ADULT: ICD-10-CM

## 2017-06-08 DIAGNOSIS — Z79.4 TYPE 2 DIABETES MELLITUS WITHOUT COMPLICATION, WITH LONG-TERM CURRENT USE OF INSULIN: ICD-10-CM

## 2017-06-08 DIAGNOSIS — Z00.00 PREVENTATIVE HEALTH CARE: ICD-10-CM

## 2017-06-08 DIAGNOSIS — E87.6 HYPOKALEMIA: ICD-10-CM

## 2017-06-08 DIAGNOSIS — E03.9 HYPOTHYROIDISM, UNSPECIFIED TYPE: ICD-10-CM

## 2017-06-08 DIAGNOSIS — E11.9 TYPE 2 DIABETES MELLITUS WITHOUT COMPLICATION, WITH LONG-TERM CURRENT USE OF INSULIN: ICD-10-CM

## 2017-06-08 DIAGNOSIS — E11.59 HYPERTENSION ASSOCIATED WITH DIABETES: Primary | ICD-10-CM

## 2017-06-08 PROCEDURE — 99499 UNLISTED E&M SERVICE: CPT | Mod: S$GLB,,, | Performed by: INTERNAL MEDICINE

## 2017-06-08 PROCEDURE — 99999 PR PBB SHADOW E&M-EST. PATIENT-LVL III: CPT | Mod: PBBFAC,,, | Performed by: INTERNAL MEDICINE

## 2017-06-08 PROCEDURE — 3066F NEPHROPATHY DOC TX: CPT | Mod: S$GLB,,, | Performed by: INTERNAL MEDICINE

## 2017-06-08 PROCEDURE — 3044F HG A1C LEVEL LT 7.0%: CPT | Mod: S$GLB,,, | Performed by: INTERNAL MEDICINE

## 2017-06-08 PROCEDURE — 99214 OFFICE O/P EST MOD 30 MIN: CPT | Mod: S$GLB,,, | Performed by: INTERNAL MEDICINE

## 2017-06-08 RX ORDER — VALSARTAN 160 MG/1
160 TABLET ORAL 2 TIMES DAILY
Qty: 180 TABLET | Refills: 0 | Status: SHIPPED | OUTPATIENT
Start: 2017-06-08 | End: 2017-09-17 | Stop reason: SDUPTHER

## 2017-06-08 NOTE — PROGRESS NOTES
Subjective:       Patient ID: Vivienne Jose is a 66 y.o. female.    Chief Complaint: Follow-up (1 month); Hypertension; and Diabetes    HPI Pt. Here for f/u for DM and HTN; I reviewed labs dated 4/12/17; cholesterol is WNL; HGBA1C was 6.9; potassium was normal on labs dated 5/4/17; BP is elevated; pt. Is in HTN digital medicine program; BP is elevated; Pt. Has gained weight   Review of Systems   Constitutional: Negative for chills, fatigue and fever.   HENT: Negative for congestion, rhinorrhea and sore throat.    Respiratory: Negative for cough, shortness of breath and wheezing.    Cardiovascular: Negative for chest pain.   Gastrointestinal: Negative for abdominal pain, nausea and vomiting.   Genitourinary: Negative for dysuria.   Musculoskeletal: Negative for arthralgias.   Skin: Negative for rash.   Neurological: Negative for dizziness and headaches.   Psychiatric/Behavioral: Negative for sleep disturbance. The patient is not nervous/anxious.        Objective:      Physical Exam   Constitutional: She is oriented to person, place, and time.   Morbid obesity   Eyes: EOM are normal.   Neck: Normal range of motion.   Cardiovascular: Normal rate, regular rhythm and normal heart sounds.    Pulmonary/Chest: Effort normal and breath sounds normal.   Abdominal: Soft. There is no tenderness. There is no rebound and no guarding.   Musculoskeletal: Normal range of motion.   Neurological: She is alert and oriented to person, place, and time.   Skin: No rash noted.       Assessment:       1. Hypertension associated with diabetes Sub-optimally controlled   2. Type 2 diabetes mellitus without complication, with long-term current use of insulin Well controlled   3. Hyperlipidemia, unspecified hyperlipidemia type Active   4. Hypokalemia Well controlled   5. Morbid obesity with BMI of 40.0-44.9, adult Sub-optimally controlled   6. Hypothyroidism, unspecified type Active   7. Need for hepatitis C screening test Active   8.  Preventative health care Active       Plan:         Hypertension associated with diabetes  Comments:  continue current regimen and increase valsartan to 160 mg BID; encouraged low Na diet and weight loss; repeat BP on f/u in 1 month   Orders:  -     valsartan (DIOVAN) 160 MG tablet; Take 1 tablet (160 mg total) by mouth 2 (two) times daily.  Dispense: 180 tablet; Refill: 0  -     CBC auto differential; Future; Expected date: 08/08/2017  -     Comprehensive metabolic panel; Future; Expected date: 08/08/2017    Type 2 diabetes mellitus without complication, with long-term current use of insulin  Comments:  continue current regimen and encouraged ADA diet; repeat HGBA1C in 3 months  Orders:  -     CBC auto differential; Future; Expected date: 08/08/2017  -     Comprehensive metabolic panel; Future; Expected date: 08/08/2017  -     Hemoglobin A1c; Future; Expected date: 08/08/2017    Hyperlipidemia, unspecified hyperlipidemia type  Comments:  continue current regimen and encouraged diet modification   Orders:  -     Lipid panel; Future; Expected date: 08/08/2017    Hypokalemia  Comments:  monitor  Orders:  -     Comprehensive metabolic panel; Future; Expected date: 08/08/2017    Morbid obesity with BMI of 40.0-44.9, adult  Comments:  encouraged diet and explained risks     Hypothyroidism, unspecified type  Comments:  continue current regimen and repeat TSH   Orders:  -     TSH; Future; Expected date: 08/08/2017    Need for hepatitis C screening test  -     Hepatitis C antibody; Future; Expected date: 08/08/2017    Preventative health care  -     CBC auto differential; Future; Expected date: 08/08/2017  -     Comprehensive metabolic panel; Future; Expected date: 08/08/2017  -     Lipid panel; Future; Expected date: 08/08/2017  -     TSH; Future; Expected date: 08/08/2017

## 2017-06-11 ENCOUNTER — PATIENT MESSAGE (OUTPATIENT)
Dept: ADMINISTRATIVE | Facility: OTHER | Age: 67
End: 2017-06-11

## 2017-06-12 ENCOUNTER — OFFICE VISIT (OUTPATIENT)
Dept: ENDOCRINOLOGY | Facility: CLINIC | Age: 67
End: 2017-06-12
Payer: MEDICARE

## 2017-06-12 VITALS
HEIGHT: 62 IN | HEART RATE: 75 BPM | RESPIRATION RATE: 16 BRPM | SYSTOLIC BLOOD PRESSURE: 151 MMHG | DIASTOLIC BLOOD PRESSURE: 63 MMHG

## 2017-06-12 DIAGNOSIS — E11.9 TYPE 2 DIABETES MELLITUS WITHOUT COMPLICATION, WITH LONG-TERM CURRENT USE OF INSULIN: ICD-10-CM

## 2017-06-12 DIAGNOSIS — E55.9 VITAMIN D DEFICIENCY: ICD-10-CM

## 2017-06-12 DIAGNOSIS — E11.9 TYPE 2 DIABETES MELLITUS WITHOUT RETINOPATHY: Primary | ICD-10-CM

## 2017-06-12 DIAGNOSIS — E11.69 DYSLIPIDEMIA ASSOCIATED WITH TYPE 2 DIABETES MELLITUS: ICD-10-CM

## 2017-06-12 DIAGNOSIS — I10 ESSENTIAL HYPERTENSION: ICD-10-CM

## 2017-06-12 DIAGNOSIS — E11.42 DIABETIC PERIPHERAL NEUROPATHY: ICD-10-CM

## 2017-06-12 DIAGNOSIS — M85.80 OSTEOPENIA, UNSPECIFIED LOCATION: ICD-10-CM

## 2017-06-12 DIAGNOSIS — E78.5 DYSLIPIDEMIA ASSOCIATED WITH TYPE 2 DIABETES MELLITUS: ICD-10-CM

## 2017-06-12 DIAGNOSIS — E89.0 POST-SURGICAL HYPOTHYROIDISM: ICD-10-CM

## 2017-06-12 DIAGNOSIS — R20.9 DISTURBANCE OF SKIN SENSATION: ICD-10-CM

## 2017-06-12 DIAGNOSIS — Z79.4 TYPE 2 DIABETES MELLITUS WITHOUT COMPLICATION, WITH LONG-TERM CURRENT USE OF INSULIN: ICD-10-CM

## 2017-06-12 LAB
BACTERIA #/AREA URNS AUTO: ABNORMAL /HPF
BILIRUB UR QL STRIP: NEGATIVE
CLARITY UR REFRACT.AUTO: ABNORMAL
COLOR UR AUTO: YELLOW
GLUCOSE UR QL STRIP: NEGATIVE
HGB UR QL STRIP: NEGATIVE
KETONES UR QL STRIP: NEGATIVE
LEUKOCYTE ESTERASE UR QL STRIP: ABNORMAL
MICROSCOPIC COMMENT: ABNORMAL
NITRITE UR QL STRIP: POSITIVE
PH UR STRIP: 6 [PH] (ref 5–8)
PROT UR QL STRIP: NEGATIVE
SP GR UR STRIP: 1.02 (ref 1–1.03)
SQUAMOUS #/AREA URNS AUTO: 1 /HPF
URN SPEC COLLECT METH UR: ABNORMAL
UROBILINOGEN UR STRIP-ACNC: NEGATIVE EU/DL
WBC #/AREA URNS AUTO: 28 /HPF (ref 0–5)

## 2017-06-12 PROCEDURE — 3066F NEPHROPATHY DOC TX: CPT | Mod: S$GLB,,, | Performed by: INTERNAL MEDICINE

## 2017-06-12 PROCEDURE — 3044F HG A1C LEVEL LT 7.0%: CPT | Mod: S$GLB,,, | Performed by: INTERNAL MEDICINE

## 2017-06-12 PROCEDURE — 99499 UNLISTED E&M SERVICE: CPT | Mod: S$GLB,,, | Performed by: INTERNAL MEDICINE

## 2017-06-12 PROCEDURE — 99214 OFFICE O/P EST MOD 30 MIN: CPT | Mod: S$GLB,,, | Performed by: INTERNAL MEDICINE

## 2017-06-12 PROCEDURE — 99999 PR PBB SHADOW E&M-EST. PATIENT-LVL III: CPT | Mod: PBBFAC,,, | Performed by: INTERNAL MEDICINE

## 2017-06-12 PROCEDURE — 81001 URINALYSIS AUTO W/SCOPE: CPT

## 2017-06-12 NOTE — PROGRESS NOTES
"Subjective:       Patient ID: Vivienne Jose is a 66 y.o. female.    Chief Complaint: Diabetes Mellitus      Mrs.Linda RICKY Jose comes for f/u for her T2DM   Steroid injection since last visit   Diet still the same  Eats out  Eats desert     HPI     Diagnosed with DM in her  early 50s   She was told she had prediabetes in her 30s       Known complications PN and nephropathy  Last eye exam 5/2016 : mild DR   Last podiatry exam 4.2015   Admission related to diabetes - none     Current regimen:  Victoza 1.2  Amaryl 4 mg qam   Metformin 1 gm bid   Levemir 14U qhs      Other meds tried:  lantus "did not work" And had trouble with the injection part   actos   januvia - stomach pain      Typical meals - 11 AM lunch, 5 PM dinner, 7 PM snack, sometimes breakfast  Education > 1 year   Exercise : will start     No Polyuria polydipsia nocturia or vision changes  Prone to UTIs  Has BAM using cpap    In 1978 she had thyroidectomy for goiter and multiple cysts     Pt concerned about N/V for several months (started July or August 2015). She did not have N/V when she initially started victoza. She said it occurs very sporadically. No particular time. She is getting full quickly. She has not seen GI. She denies fever or abdominal pain.    Review of Systems   Constitutional: Negative for fatigue and unexpected weight change.   HENT: Negative for trouble swallowing and voice change.    Eyes: Negative for photophobia and visual disturbance.   Gastrointestinal: Positive for nausea. Negative for diarrhea.   Endocrine: Negative for polydipsia and polyuria.   Genitourinary: Positive for dysuria (UTI).   Skin: Negative for wound.   Allergic/Immunologic: Negative for food allergies.   Neurological: Positive for numbness.   Psychiatric/Behavioral: Negative for dysphoric mood. The patient is not nervous/anxious.        Objective:      Physical Exam   Neck: No thyromegaly present.   Cardiovascular: Normal rate.    Pulmonary/Chest: Effort normal. "   Abdominal: Soft.   Musculoskeletal: She exhibits no edema.   Vitals reviewed.      injection sites with hypertrophy or bruising   sensation slightly decreased to vibration       Hemoglobin A1C   Date Value Ref Range Status   04/12/2017 6.9 (H) 4.5 - 6.2 % Final     Comment:     According to ADA guidelines, hemoglobin A1C <7.0% represents  optimal control in non-pregnant diabetic patients.  Different  metrics may apply to specific populations.   Standards of Medical Care in Diabetes - 2016.  For the purpose of screening for the presence of diabetes:  <5.7%     Consistent with the absence of diabetes  5.7-6.4%  Consistent with increasing risk for diabetes   (prediabetes)  >or=6.5%  Consistent with diabetes  Currently no consensus exists for use of hemoglobin A1C  for diagnosis of diabetes for children.     12/01/2016 7.0 (H) 4.5 - 6.2 % Final     Comment:     According to ADA guidelines, hemoglobin A1C <7.0% represents  optimal control in non-pregnant diabetic patients.  Different  metrics may apply to specific populations.   Standards of Medical Care in Diabetes - 2016.  For the purpose of screening for the presence of diabetes:  <5.7%     Consistent with the absence of diabetes  5.7-6.4%  Consistent with increasing risk for diabetes   (prediabetes)  >or=6.5%  Consistent with diabetes  Currently no consensus exists for use of hemoglobin A1C  for diagnosis of diabetes for children.     06/13/2016 7.5 (H) 4.5 - 6.2 % Final     Lab Results   Component Value Date    TSH 1.689 12/01/2016    TSH 2.033 07/13/2015    TSH 4.182 (H) 04/20/2015     Results for orders placed or performed in visit on 05/04/17   Aldosterone   Result Value Ref Range    Aldosterone 8.8 ng/dL   Renin   Result Value Ref Range    Renin Activity 1.2 ng/mL/h   Potassium   Result Value Ref Range    Potassium 3.7 3.5 - 5.1 mmol/L     US 05/2015  Small right lobe from previous thyroid surgery otherwise unremarkable.    RECOMMENDATION: Repeat ultrasound in  3 years  Assessment:     Plan:       # T2DM with PN, diabetic nephropathy and morbid obesity- reviewed BG log from email    - continue metformin, amaryl, levemir 14 units qhs   - recurrent UTI, there is 4% risk of UTI with victoza but she did get them before   - increase slightly over 1.2 mg  victoza  daily    - diet and exercise - skip desserts         -Standards of care:   Lipids uptodate   Urine micro uptodate - uptodate  CMP today   HgBA1C uptodate   Eye exam- 5/2017 - this year no DR   Foot exam- completed.    #Hypertension associated with diabetes on ARB    - uncontrolled   - follow up with ,recently increase in medication     #Dyslipidemia associated with diabetes -   Follows with     # Hypothyroidism -   Continue lt4 75 mcg daily  Repeat US 2018  Goal is a normal TSH  Avoid exogenous hyperthyroidism as this can accelerate bone loss and increase risk of CV complications.  Repeat TSH in 1 year     # neuropathy  On neurontin   Check Vit B12

## 2017-06-12 NOTE — PATIENT INSTRUCTIONS
# T2DM with PN, diabetic nephropathy and morbid obesity- reviewed BG log from email    - continue metformin, amaryl, levemir 14 units qhs   - recurrent UTI, there is 4% risk of UTI with victoza but she did get them before   - increase slightly over 1.2 mg  victoza  daily    - diet and exercise - skip deserts         -Standards of care:   Lipids uptodate   Urine micro uptodate - uptodate  CMP today   HgBA1C uptodate   Eye exam- 5/2017 - this year no DR   Foot exam- completed.    #Hypertension associated with diabetes on ARB    - uncontrolled   - follow up with ,recently increase in medication     #Dyslipidemia associated with diabetes -   Follows with     # Hypothyroidism -   Continue lt4 75 mcg daily  Repeat US 2018  Goal is a normal TSH  Avoid exogenous hyperthyroidism as this can accelerate bone loss and increase risk of CV complications.  Repeat TSH in 1 year

## 2017-06-14 ENCOUNTER — PATIENT OUTREACH (OUTPATIENT)
Dept: OTHER | Facility: OTHER | Age: 67
End: 2017-06-14
Payer: MEDICARE

## 2017-06-14 NOTE — PROGRESS NOTES
Last 5 Patient Entered Readings                                                               Current 30 Day Average: 144/72     Recent Readings 6/12/2017 6/11/2017 6/11/2017 6/10/2017 6/10/2017    Systolic BP (mmHg) 148 143 138 141 145    Diastolic BP (mmHg) 80 73 68 68 77    Pulse 66 73 66 70 66      Finally started valsartan. Went to  Thursday. Taking 2 valsartan.      Follow up with Mrs. Vivienne Jose completed. Mrs. Jose is doing well. Patient reports that she finally started taking valsartan. However, she saw Dr. Felton 6/8 and increased valsartan to twice daily. Mrs. Jose has yet to made any effort to increase physical activity or make changes to her diet. She reports that she will try.Patient did not have any further questions or concerns. I will follow up in a few weeks to see how she is doing and progressing.

## 2017-06-16 ENCOUNTER — PATIENT MESSAGE (OUTPATIENT)
Dept: UROLOGY | Facility: CLINIC | Age: 67
End: 2017-06-16

## 2017-06-16 ENCOUNTER — DOCUMENTATION ONLY (OUTPATIENT)
Dept: NEPHROLOGY | Facility: CLINIC | Age: 67
End: 2017-06-16

## 2017-06-18 ENCOUNTER — PATIENT MESSAGE (OUTPATIENT)
Dept: ALLERGY | Facility: CLINIC | Age: 67
End: 2017-06-18

## 2017-06-18 ENCOUNTER — PATIENT MESSAGE (OUTPATIENT)
Dept: INTERNAL MEDICINE | Facility: CLINIC | Age: 67
End: 2017-06-18

## 2017-06-19 ENCOUNTER — PATIENT OUTREACH (OUTPATIENT)
Dept: OTHER | Facility: OTHER | Age: 67
End: 2017-06-19

## 2017-06-19 DIAGNOSIS — E11.42 DIABETIC POLYNEUROPATHY ASSOCIATED WITH TYPE 2 DIABETES MELLITUS: ICD-10-CM

## 2017-06-19 RX ORDER — GABAPENTIN 300 MG/1
300 CAPSULE ORAL NIGHTLY
Qty: 90 CAPSULE | Refills: 0 | Status: SHIPPED | OUTPATIENT
Start: 2017-06-19 | End: 2017-06-20 | Stop reason: SDUPTHER

## 2017-06-19 NOTE — PROGRESS NOTES
Last 5 Patient Entered Redings Current 30 Day Average: 141/73     Recent Readings 7/19/2017 7/18/2017 7/17/2017 7/16/2017 7/15/2017    Systolic BP (mmHg) 153 148 145 154 149    Diastolic BP (mmHg) 70 82 79 79 80    Pulse 80 77 77 74 85          Ms. Jose's BP is approaching goal. Patient denies any symptoms and has no questions/concerns. Dr. Felton increased valsartan to 160 mg BID from QD and she is tolerating this increase with no complaints. She is pleased to see her BP average improve also.     Current HTN regimen:  Hypertension Medications             amlodipine (NORVASC) 5 MG tablet Take 1 tablet (5 mg total) by mouth once daily.    hydrochlorothiazide (HYDRODIURIL) 25 MG tablet Take 1 tablet (25 mg total) by mouth once daily.    valsartan (DIOVAN) 160 MG tablet Take 1 tablet (160 mg total) by mouth 2 (two) times daily.          Will continue to monitor regularly. Will follow up in 1-2 months, sooner if BP begins to trend upward or downward.    Patient has my contact information and knows to call with any concerns or clinical changes.

## 2017-06-20 DIAGNOSIS — E11.42 DIABETIC POLYNEUROPATHY ASSOCIATED WITH TYPE 2 DIABETES MELLITUS: ICD-10-CM

## 2017-06-20 NOTE — TELEPHONE ENCOUNTER
----- Message from Asaf Maldonado sent at 6/20/2017 12:13 PM CDT -----  Contact: Caterina from [facility]/[phone]  Pharmacy is requesting a refill on the patient's medication.  Please advise.    gabapentin (NEURONTIN) 300 MG capsule

## 2017-06-21 RX ORDER — GABAPENTIN 300 MG/1
300 CAPSULE ORAL NIGHTLY
Qty: 90 CAPSULE | Refills: 0 | Status: SHIPPED | OUTPATIENT
Start: 2017-06-21 | End: 2017-12-11 | Stop reason: SDUPTHER

## 2017-07-06 ENCOUNTER — PATIENT OUTREACH (OUTPATIENT)
Dept: OTHER | Facility: OTHER | Age: 67
End: 2017-07-06

## 2017-07-06 NOTE — PROGRESS NOTES
Last 5 Patient Entered Readings                                                               Current 30 Day Average: 142/73     Recent Readings 7/6/2017 7/5/2017 7/4/2017 7/1/2017 6/30/2017    Systolic BP (mmHg) 129 136 138 138 138    Diastolic BP (mmHg) 64 69 77 68 62    Pulse 71 71 80 77 76        Follow up with Mrs. Vivienne Jose completed. Mrs. Jose is doing well. Patient will be out of town until the end of July visiting her daughters in [place] and [place]. Patient reports that she increased valsartan mid June. She is taking one in the morning and one in the evening. Patient is pleased to see her BP trending downward. She reports that she has not been dining out as often. She has not increased physical activity. Patient did not have any further questions or concerns. I will follow up in a few weeks to see how she is doing and progressing.

## 2017-07-24 ENCOUNTER — TELEPHONE (OUTPATIENT)
Dept: GASTROENTEROLOGY | Facility: CLINIC | Age: 67
End: 2017-07-24

## 2017-07-24 NOTE — TELEPHONE ENCOUNTER
Left message for patient to return call to office in regards to scheduling colonoscopy.    ----- Message from Linsey Marie sent at 7/21/2017  3:02 PM CDT -----  Contact: [phone]  Pt got a letter stating she is due for a colonoscopy . Please advise

## 2017-08-02 ENCOUNTER — OFFICE VISIT (OUTPATIENT)
Dept: INTERNAL MEDICINE | Facility: CLINIC | Age: 67
End: 2017-08-02
Payer: MEDICARE

## 2017-08-02 VITALS
DIASTOLIC BLOOD PRESSURE: 70 MMHG | WEIGHT: 247.38 LBS | HEART RATE: 86 BPM | SYSTOLIC BLOOD PRESSURE: 145 MMHG | BODY MASS INDEX: 45.52 KG/M2 | OXYGEN SATURATION: 97 % | HEIGHT: 62 IN

## 2017-08-02 DIAGNOSIS — E11.9 TYPE 2 DIABETES MELLITUS WITHOUT COMPLICATION, WITH LONG-TERM CURRENT USE OF INSULIN: Primary | ICD-10-CM

## 2017-08-02 DIAGNOSIS — I25.10 CORONARY ARTERY DISEASE INVOLVING NATIVE CORONARY ARTERY OF NATIVE HEART WITHOUT ANGINA PECTORIS: ICD-10-CM

## 2017-08-02 DIAGNOSIS — Z79.4 TYPE 2 DIABETES MELLITUS WITHOUT COMPLICATION, WITH LONG-TERM CURRENT USE OF INSULIN: Primary | ICD-10-CM

## 2017-08-02 DIAGNOSIS — I15.2 HYPERTENSION ASSOCIATED WITH DIABETES: ICD-10-CM

## 2017-08-02 DIAGNOSIS — Z12.31 ENCOUNTER FOR SCREENING MAMMOGRAM FOR MALIGNANT NEOPLASM OF BREAST: ICD-10-CM

## 2017-08-02 DIAGNOSIS — E78.5 HYPERLIPIDEMIA, UNSPECIFIED HYPERLIPIDEMIA TYPE: ICD-10-CM

## 2017-08-02 DIAGNOSIS — R22.9 SKIN NODULE: ICD-10-CM

## 2017-08-02 DIAGNOSIS — E66.01 MORBID OBESITY WITH BMI OF 45.0-49.9, ADULT: ICD-10-CM

## 2017-08-02 DIAGNOSIS — G47.33 OSA ON CPAP: ICD-10-CM

## 2017-08-02 DIAGNOSIS — E11.59 HYPERTENSION ASSOCIATED WITH DIABETES: ICD-10-CM

## 2017-08-02 DIAGNOSIS — K57.90 DIVERTICULOSIS OF INTESTINE WITHOUT BLEEDING, UNSPECIFIED INTESTINAL TRACT LOCATION: ICD-10-CM

## 2017-08-02 PROCEDURE — 1159F MED LIST DOCD IN RCRD: CPT | Mod: S$GLB,,, | Performed by: INTERNAL MEDICINE

## 2017-08-02 PROCEDURE — 99214 OFFICE O/P EST MOD 30 MIN: CPT | Mod: S$GLB,,, | Performed by: INTERNAL MEDICINE

## 2017-08-02 PROCEDURE — 1126F AMNT PAIN NOTED NONE PRSNT: CPT | Mod: S$GLB,,, | Performed by: INTERNAL MEDICINE

## 2017-08-02 PROCEDURE — 99999 PR PBB SHADOW E&M-EST. PATIENT-LVL III: CPT | Mod: PBBFAC,,, | Performed by: INTERNAL MEDICINE

## 2017-08-02 PROCEDURE — 3044F HG A1C LEVEL LT 7.0%: CPT | Mod: S$GLB,,, | Performed by: INTERNAL MEDICINE

## 2017-08-02 PROCEDURE — 3066F NEPHROPATHY DOC TX: CPT | Mod: S$GLB,,, | Performed by: INTERNAL MEDICINE

## 2017-08-02 PROCEDURE — 99499 UNLISTED E&M SERVICE: CPT | Mod: S$GLB,,, | Performed by: INTERNAL MEDICINE

## 2017-08-02 NOTE — PROGRESS NOTES
Subjective:       Patient ID: Vivienne Jose is a 67 y.o. female.    Chief Complaint: Hypertension (1 mos follow up)    HPI Pt. Here for f/u for HTN and DM; pt.  is with the pt.; pt. Sees cardiology for CAD; nuclear stress dated 9/29/15 was negative for ischemia; she denies chest pain or palpitations and on filling out home ROS she thought it meant a history of symptoms; I reviewed labs dated 4/12/17; HGBA1C was 6.9; cholesterol is WNL; BP is borderline elevated; she does not want me to adjust her BP meds; she is in HTN digital program; weight is elevated but stable; she uses CPAP nightly; she sees renal and endocrine; she will schedule colonoscopy and I explained risks of non-compliance; colonoscopy dated 8/29/14 showed polyp which was removed and diverticulosis was noted; repeat in 3 years; pt. Has a nodule to L axilla area for past 6 months; it has not changed in size; she would like surgery referal for removal/biopsy; mammo dated 9/29/15 was negative  Review of Systems   Constitutional: Negative for chills, fatigue and fever.   HENT: Negative for congestion, rhinorrhea and sore throat.    Respiratory: Negative for cough and wheezing.    Gastrointestinal: Negative for abdominal pain, nausea and vomiting.   Genitourinary: Negative for dysuria.   Musculoskeletal: Negative for arthralgias and neck pain.   Skin: Negative for rash.        Firm SQ nodule to L axilla which has been present for past 6 months   Neurological: Negative for dizziness and headaches.   Psychiatric/Behavioral: Negative for sleep disturbance. The patient is not nervous/anxious.        Objective:      Physical Exam   Constitutional: She is oriented to person, place, and time.   Morbid obesity   Eyes: EOM are normal.   Neck: Normal range of motion.   Cardiovascular: Normal rate, regular rhythm and normal heart sounds.    Pulmonary/Chest: Effort normal and breath sounds normal.   Abdominal: Soft. There is no tenderness. There is no rebound  and no guarding.   Musculoskeletal: Normal range of motion.   Neurological: She is alert and oriented to person, place, and time.   Skin: No rash noted.   Firm, SQ nodule to L axilla       Assessment:       1. Type 2 diabetes mellitus without complication, with long-term current use of insulin Well controlled   2. Hypertension associated with diabetes Sub-optimally controlled   3. Coronary artery disease involving native coronary artery of native heart without angina pectoris Well controlled   4. Hyperlipidemia, unspecified hyperlipidemia type Well controlled   5. BAM on CPAP Well controlled   6. Diverticulosis of intestine without bleeding, unspecified intestinal tract location Active   7. Skin nodule Active   8. Morbid obesity with BMI of 45.0-49.9, adult Sub-optimally controlled   9. Encounter for screening mammogram for malignant neoplasm of breast  Active       Plan:         Type 2 diabetes mellitus without complication, with long-term current use of insulin  Comments:  continue current regimen and encouraged ADA diet; repeat HGBA1C in 1 month     Hypertension associated with diabetes  Comments:  pt. does not want any further adjustment in meds; continue current regimen and encouraged low Na diet and weight loss    Coronary artery disease involving native coronary artery of native heart without angina pectoris  Comments:  f/u cardiology who is managing    Hyperlipidemia, unspecified hyperlipidemia type  Comments:  continue current regimen and encouraged diet modification     BAM on CPAP  Comments:  continue CPAP    Diverticulosis of intestine without bleeding, unspecified intestinal tract location    Skin nodule  Comments:  pt. agrees to surgery referal for removal; get screening mammo  Orders:  -     Ambulatory consult to General Surgery    Morbid obesity with BMI of 45.0-49.9, adult  Comments:  encouraged diet and explained risks    Encounter for screening mammogram for malignant neoplasm of breast   -      Mammo Digital Screening Bilateral With CAD; Future; Expected date: 08/02/2017

## 2017-08-04 ENCOUNTER — HOSPITAL ENCOUNTER (OUTPATIENT)
Dept: RADIOLOGY | Facility: HOSPITAL | Age: 67
Discharge: HOME | End: 2017-08-04
Attending: INTERNAL MEDICINE
Payer: MEDICARE

## 2017-08-04 ENCOUNTER — OFFICE VISIT (OUTPATIENT)
Dept: SURGERY | Facility: CLINIC | Age: 67
End: 2017-08-04
Payer: MEDICARE

## 2017-08-04 ENCOUNTER — TELEPHONE (OUTPATIENT)
Dept: GASTROENTEROLOGY | Facility: CLINIC | Age: 67
End: 2017-08-04

## 2017-08-04 VITALS
HEIGHT: 62 IN | SYSTOLIC BLOOD PRESSURE: 134 MMHG | DIASTOLIC BLOOD PRESSURE: 70 MMHG | BODY MASS INDEX: 45.36 KG/M2 | WEIGHT: 246.5 LBS | TEMPERATURE: 97 F | HEART RATE: 82 BPM

## 2017-08-04 DIAGNOSIS — M79.89 SOFT TISSUE MASS: ICD-10-CM

## 2017-08-04 DIAGNOSIS — Z12.31 ENCOUNTER FOR SCREENING MAMMOGRAM FOR MALIGNANT NEOPLASM OF BREAST: ICD-10-CM

## 2017-08-04 PROCEDURE — 3008F BODY MASS INDEX DOCD: CPT | Mod: S$GLB,,, | Performed by: SURGERY

## 2017-08-04 PROCEDURE — 99204 OFFICE O/P NEW MOD 45 MIN: CPT | Mod: 57,S$GLB,, | Performed by: SURGERY

## 2017-08-04 PROCEDURE — 1126F AMNT PAIN NOTED NONE PRSNT: CPT | Mod: S$GLB,,, | Performed by: SURGERY

## 2017-08-04 PROCEDURE — 77067 SCR MAMMO BI INCL CAD: CPT | Mod: TC

## 2017-08-04 PROCEDURE — 1159F MED LIST DOCD IN RCRD: CPT | Mod: S$GLB,,, | Performed by: SURGERY

## 2017-08-04 PROCEDURE — 77067 SCR MAMMO BI INCL CAD: CPT | Mod: 26,,, | Performed by: RADIOLOGY

## 2017-08-04 PROCEDURE — 99999 PR PBB SHADOW E&M-EST. PATIENT-LVL III: CPT | Mod: PBBFAC,,, | Performed by: SURGERY

## 2017-08-04 PROCEDURE — 77063 BREAST TOMOSYNTHESIS BI: CPT | Mod: 26,,, | Performed by: RADIOLOGY

## 2017-08-04 NOTE — PROGRESS NOTES
History & Physical    SUBJECTIVE:     History of Present Illness:    Referred by dr escobar for evaluation of left axillary mass  It has been present for 6 mo, it has gotten slightly larger in size. It is not painful. She first noticed it because it was sore, but now it is not noticeable.   She had her last mammogram this morning, birad 1.       Aggravating factors: pressure.  Alleviating factors: none. Associated symptoms: none. The patient denies constipation, diarrhea, dysuria, fever, hematochezia, hematuria and vomiting.    She reports frequent UTIs, nephrolith eval by Dr. RICKY Lyons  Baseline nausea due to medications  She has fluctuating weight based on her diet and fluid.     She is on premarin since 3/2017.     Chief Complaint   Patient presents with    Mass       Review of patient's allergies indicates:   Allergen Reactions    Sulfa (sulfonamide antibiotics) Nausea Only    Ciprofloxacin     Januvia [sitagliptin]      abd pain    Lisinopril     Lotensin [benazepril]     Nexium [esomeprazole magnesium] Other (See Comments)     Gas       Current Outpatient Prescriptions   Medication Sig Dispense Refill    aspirin (ECOTRIN) 81 MG EC tablet Take 81 mg by mouth once daily.      biotin 10,000 mcg Cap Take by mouth.      blood sugar diagnostic (BLOOD GLUCOSE TEST) Strp 1 strip by Misc.(Non-Drug; Combo Route) route 2 (two) times daily. [employer] True Metrix test strips 300 strip 3    blood-glucose meter Misc [employer] True Metrix Air meter 1 each 0    calcium carbonate-vitamin D3 600 mg(1,500mg) -100 unit Cap Take 1 tablet by mouth once daily.      fluticasone (FLONASE) 50 mcg/actuation nasal spray 2 sprays by Each Nare route once daily. 16 g 12    gabapentin (NEURONTIN) 300 MG capsule Take 1 capsule (300 mg total) by mouth every evening. 90 capsule 0    glimepiride (AMARYL) 4 MG tablet Take 1 tablet (4 mg total) by mouth daily with breakfast. 90 tablet 3    hydrochlorothiazide (HYDRODIURIL) 25 MG tablet Take  "1 tablet (25 mg total) by mouth once daily. 90 tablet 3    L.acid-B.bifidum-B.animal-FOS (PROBIOTIC COMPLEX) 25 billion cell -100 mg Cap Take 1 capsule by mouth once daily.      lancets 28 gauge Misc 1 lancet by Misc.(Non-Drug; Combo Route) route 2 (two) times daily. For True Metrix meter 300 each 1    LEVEMIR FLEXTOUCH 100 unit/mL (3 mL) InPn pen Inject 14 Units into the skin once daily.      levothyroxine (SYNTHROID) 75 MCG tablet Take 1 tablet (75 mcg total) by mouth once daily. 90 tablet 1    loratadine (CLARITIN) 10 mg tablet Take 10 mg by mouth once daily.      lovastatin (MEVACOR) 40 MG tablet TAKE 1 TABLET  NIGHTLY 90 tablet 1    magnesium oxide-Mg AA chelate (MAGNESIUM, AMINO ACID CHELATE,) 133 mg Tab Take by mouth as directed.       metformin (GLUCOPHAGE) 1000 MG tablet TAKE 1 TABLET TWICE DAILY WITH MEALS 180 tablet 0    omeprazole (PRILOSEC) 20 MG capsule TAKE 1 CAPSULE EVERY DAY AS NEEDED  FOR  GERD 90 capsule 1    pen needle, diabetic (BD ULTRA-FINE EDUARDO PEN NEEDLES) 32 gauge x 5/32" Ndle Use with victoza and levemir, 2 injections daily 200 each 3    UNABLE TO FIND Zatador eye drops for allergies      valacyclovir (VALTREX) 500 MG tablet TAKE 1 TABLET ONE TIME DAILY 90 tablet 1    valsartan (DIOVAN) 160 MG tablet Take 1 tablet (160 mg total) by mouth 2 (two) times daily. 180 tablet 0    amlodipine (NORVASC) 5 MG tablet Take 1 tablet (5 mg total) by mouth once daily. 90 tablet 1    azelastine (ASTELIN) 137 mcg (0.1 %) nasal spray 1 spray (137 mcg total) by Nasal route daily as needed for Rhinitis. 90 mL 1    conjugated estrogens (PREMARIN) vaginal cream Place 0.5 g vaginally 3 (three) times a week. 30 g 3    diclofenac sodium (VOLTAREN) 1 % Gel Apply 2 g topically 4 (four) times daily. 3 Tube 3    econazole nitrate 1 % cream Apply topically once daily. 60 g 3    hydrocodone-acetaminophen 5-325mg (NORCO) 5-325 mg per tablet Take 1 tablet by mouth every 6 (six) hours as needed for Pain. " 30 tablet 0     Current Facility-Administered Medications   Medication Dose Route Frequency Provider Last Rate Last Dose    Allergy Mix   Subcutaneous 1 time in Clinic/HOD Ailin Mills MD           Past Medical History:   Diagnosis Date    Allergy     Angio-edema     Arthritis     Cataract     Cerebrovascular malformation     Colon polyps     Coronary artery disease     Diabetes mellitus, type 2     Diabetic peripheral neuropathy     GERD (gastroesophageal reflux disease)     Herpes infection     Hyperlipidemia     Hypertension     Hypothyroidism     Kidney stones     Nausea & vomiting 11/23/2015    Obesity, morbid     Ovarian cyst     Pyelonephritis, chronic     Seizure disorder, focal motor     Sleep apnea     Type II or unspecified type diabetes mellitus with neurological manifestations, uncontrolled     Urinary tract infection     Urticaria     Vaginal infection      Past Surgical History:   Procedure Laterality Date    CARPAL TUNNEL RELEASE Right 2014    COLONOSCOPY      CYST REMOVAL      EXTRACORPOREAL SHOCK WAVE LITHOTRIPSY  2002    HYSTERECTOMY  1984    THYROIDECTOMY  1977         TONSILLECTOMY, ADENOIDECTOMY      trigger finder       TUBAL LIGATION       Family History   Problem Relation Age of Onset    Cancer Father     Allergies Son     Skin cancer Mother     Macular degeneration Mother     Dementia Mother     No Known Problems Daughter     No Known Problems Daughter     No Known Problems Daughter     Glaucoma Maternal Aunt      Great Maternal Aunt    Leukemia Maternal Uncle     Amblyopia Neg Hx     Cataracts Neg Hx     Retinal detachment Neg Hx     Strabismus Neg Hx     Diabetes Neg Hx     Hypertension Neg Hx     Stroke Neg Hx     Thyroid disease Neg Hx     Kidney disease Neg Hx      Social History   Substance Use Topics    Smoking status: Never Smoker    Smokeless tobacco: Never Used    Alcohol use No          Review of Systems  "  Constitutional: Positive for fever. Negative for activity change, appetite change and chills.   HENT: Negative for congestion and sore throat.    Eyes: Negative for photophobia and visual disturbance.   Respiratory: Negative for cough, shortness of breath and wheezing.    Cardiovascular: Negative for chest pain and palpitations.   Gastrointestinal: Negative for abdominal distention and abdominal pain.   Genitourinary: Positive for dysuria. Negative for difficulty urinating and frequency.   Musculoskeletal: Negative for gait problem and joint swelling.   Skin: Negative for rash and wound.   Neurological: Negative for dizziness and headaches.   Hematological: Negative for adenopathy. Does not bruise/bleed easily.   Psychiatric/Behavioral: Negative for dysphoric mood and sleep disturbance.       OBJECTIVE:     Vital Signs (Most Recent)  Temp: 96.9 °F (36.1 °C) (08/04/17 1412)  Pulse: 82 (08/04/17 1412)  BP: 134/70 (08/04/17 1412)  5' 2" (1.575 m)  111.8 kg (246 lb 7.6 oz)     Physical Exam   Constitutional: She is oriented to person, place, and time. She appears well-developed and well-nourished. No distress.   HENT:   Head: Normocephalic and atraumatic.   Eyes: Conjunctivae and EOM are normal. No scleral icterus.   Neck: Normal range of motion.   Cardiovascular: Normal rate and intact distal pulses.    Pulmonary/Chest: Effort normal. No stridor. No respiratory distress.   Abdominal: Soft. She exhibits no distension. There is no tenderness.   Musculoskeletal: Normal range of motion. She exhibits no edema or tenderness.   Neurological: She is alert and oriented to person, place, and time.   Skin: No rash noted. No pallor.   Left axillary skin soft tissue mass hard circumscribed with no overlying infection     Psychiatric: She has a normal mood and affect. Her behavior is normal.       Laboratory  CBC: Reviewed  BMP: Reviewed    Diagnostic Results:  n/a    ASSESSMENT/PLAN:   67 female with soft tissue mass, likely " cyst  We discussed this pathology and possible benign or malignant causes of mass  She would like to proceed with excision  Risks of surgery discussed including bleeding, infection, pain, scarring, wound complications, injury to local structures, and need for further surgery.  The patient demonstrated understanding of risks and consent signed.   Will book for office procedure

## 2017-08-04 NOTE — TELEPHONE ENCOUNTER
Kaitlin to call office back       ----- Message from Linsey Marie sent at 8/4/2017  8:27 AM CDT -----  Contact: [phone]  Pt got a letter stating its time to schedule a colonoscopy . Please advise

## 2017-08-04 NOTE — LETTER
August 4, 2017      Jay Jay Felton MD  [address] [facility]  [place] [address]           [address]  [address]  [place] [address]  Phone: [phone]          Patient: Vivienne Jose   MR Number: [id number]   YOB: 1950   Date of Visit: 8/4/2017       Dear Dr. Jay Jay Felton:    Thank you for referring Vivienne Jose to me for evaluation. Attached you will find relevant portions of my assessment and plan of care.    If you have questions, please do not hesitate to call me. I look forward to following Vivienne Jose along with you.    Sincerely,    Shena Lyons MD    Enclosure  CC:  No Recipients    If you would like to receive this communication electronically, please contact [email or web address] or [phone] to request more information on [employer] Link access.    For providers and/or their staff who would like to refer a patient to [facility], please contact us through our one-stop-shop provider referral line, Lexi Mcadams, at [phone].    If you feel you have received this communication in error or would no longer like to receive these types of communications, please e-mail [email or web address]

## 2017-08-07 ENCOUNTER — PATIENT OUTREACH (OUTPATIENT)
Dept: OTHER | Facility: OTHER | Age: 67
End: 2017-08-07

## 2017-08-07 DIAGNOSIS — Z86.010 HISTORY OF COLON POLYPS: ICD-10-CM

## 2017-08-07 DIAGNOSIS — Z12.11 COLON CANCER SCREENING: Primary | ICD-10-CM

## 2017-08-07 NOTE — PROGRESS NOTES
"Last 5 Patient Entered Redings Current 30 Day Average: 144/75     Recent Readings 8/5/2017 8/3/2017 8/3/2017 8/1/2017 7/30/2017    Systolic BP (mmHg) 145 145 150 144 144    Diastolic BP (mmHg) 69 76 79 72 77    Pulse 70 63 66 69 76        Follow up with Ms. Vivienne Jose completed. Ms. Jose is home from visiting her daughters in [place] and [place]. Ms. Jose is not motivated to begin any type of exercise routine. Patient states that she does not want to increase any BP medications. However, she is not willing to make significant changes to her diet and exercise routine at this time. Ms. Jose "knows" she needs to, but also knows she isn't going to "anytime soon".  Patient is still dining out often, but would like to cook at home more often. Patient reports that she does not add salt to prepared foods. Will send resources for avoiding salt when dining out, and encouraged Ms. Jose to stay active throughout the day i.e. working around the house, taking a short walk, making an extra lap around the grocery store, parking further back in the parking lot, etc.  Patient did not have any further questions or concerns. I will follow up in a few weeks to see how she is doing and progressing.          "

## 2017-08-07 NOTE — TELEPHONE ENCOUNTER
----- Message from Hilaria Campos sent at 8/4/2017  1:39 PM CDT -----  Contact: Self/ [phone]  Patient is returning your call.  Please advise.

## 2017-08-07 NOTE — TELEPHONE ENCOUNTER
Colonoscopy Referral  Referring Physician: Dr. Wall   Date: 2/16/17  Reason for Referral: Screening   Family History of:   Colon polyp: No  Relationship/Age of Onset:   Colon cancer: No  Relationship/Age of Onset:   Patient with:   Hemoccults Done: Yes  Iron deficient:/ No  On Blood Thinner: No  Valvular heart disease/valve replacement: No  Anemia Present: No  On NSAID: No  Lung disease: No  Kidney disease: No  Hx of polyps: Yes  Hx of colon cancer: No  Previous colon evalations: Yes   Colonoscopy/  When: 8/29/17    Where: [facility]  Pertinent symptoms:   Current Outpatient Prescriptions:  loperamide (IMODIUM) 1 mg/5 mL solution, Take by mouth every 6 (six) hours as needed for Diarrhea., Disp: , Rfl:   No current facility-administered medications for this visit.   ?  Review of patient's allergies indicates:  No Known Allergies  Patient was scheduled for colonoscopy on 9/13/17 with Dr. Wall at [facility]. Golytely  instructions were reviewed with patient.

## 2017-08-09 ENCOUNTER — OFFICE VISIT (OUTPATIENT)
Dept: SURGERY | Facility: CLINIC | Age: 67
End: 2017-08-09
Payer: MEDICARE

## 2017-08-09 VITALS
HEART RATE: 77 BPM | SYSTOLIC BLOOD PRESSURE: 138 MMHG | BODY MASS INDEX: 45.24 KG/M2 | HEIGHT: 62 IN | WEIGHT: 245.81 LBS | DIASTOLIC BLOOD PRESSURE: 78 MMHG | TEMPERATURE: 98 F

## 2017-08-09 DIAGNOSIS — M79.89 SOFT TISSUE MASS: Primary | ICD-10-CM

## 2017-08-09 PROCEDURE — 99499 UNLISTED E&M SERVICE: CPT | Mod: S$GLB,,, | Performed by: SURGERY

## 2017-08-09 PROCEDURE — 24075 EXC ARM/ELBOW LES SC < 3 CM: CPT | Mod: S$GLB,,, | Performed by: SURGERY

## 2017-08-09 PROCEDURE — 88304 TISSUE EXAM BY PATHOLOGIST: CPT | Performed by: PATHOLOGY

## 2017-08-09 PROCEDURE — 99999 PR PBB SHADOW E&M-EST. PATIENT-LVL III: CPT | Mod: PBBFAC,,, | Performed by: SURGERY

## 2017-08-09 RX ORDER — POLYETHYLENE GLYCOL 3350, SODIUM SULFATE ANHYDROUS, SODIUM BICARBONATE, SODIUM CHLORIDE, POTASSIUM CHLORIDE 236; 22.74; 6.74; 5.86; 2.97 G/4L; G/4L; G/4L; G/4L; G/4L
4 POWDER, FOR SOLUTION ORAL SEE ADMIN INSTRUCTIONS
Qty: 4000 ML | Refills: 0 | Status: ON HOLD | OUTPATIENT
Start: 2017-08-09 | End: 2017-09-13 | Stop reason: CLARIF

## 2017-08-09 NOTE — PROGRESS NOTES
PROCEDURE: Elliptical excision with intermediate layered repair    ANESTHETIC: 5 cc 1% Lidocaine with Epinephrine 1:100,000, buffered    SURGICAL PREP: chloraprep    SURGEON: Shena Lyons MD    ASSISTANTS: Elise Pang MD    PREOPERATIVE DIAGNOSIS: Small soft tissue mass    POSTOPERATIVE DIAGNOSIS: Epidermal Inclusion Cyst    PATHOLOGIC DIAGNOSIS: Pending    LOCATION: left axilla    INITIAL LESION SIZE: 5 mm    EXCISED DIAMETER: 1.0 cm    PREPARATION:  The diagnosis, procedure, alternatives, benefits and risks, including but not limited to: drug reactions, pain, scar or cosmetic defect, local sensation disturbances, and/or recurrence of present condition were explained to the patient. The patient elected to proceed.    PROCEDURE:  The area of the left axilla was prepped, draped, and anesthetized in the usual sterile fashion. Lesional tissue was carefully marked prior to administration of the anesthesia. An elliptical excision was drawn down the central 1/2 of the lesion. A fusiform elliptical excision was done with a #15 blade carried down completely through the dermis into the subcutaneous tissues. Then, with a combination of blunt and sharp dissection, an intact epidermal cyst was removed and submitted for histologic evaluation. Then, gentle pressure was used to obtain hemostasis. Blood loss was minimal. The wound was then approximated in a layered fashion with subcutaneous and intradermal 3-0 Vicryl buried simple interrupted sutures. The wound was then superficially closed with interrupted 2-0 nylon sutures.    The patient tolerated the procedure well.    The area was cleaned and dressed appropriately and the patient was given wound care instructions, as well as an appointment for follow-up evaluation.    LENGTH OF REPAIR: 1.5 cm    Vitals:    08/09/17 1012   BP: 138/78   BP Location: Right arm   Patient Position: Sitting   Pulse: 77   Temp: 97.9 °F (36.6 °C)   TempSrc: Oral   Weight: 111.5 kg (245 lb  "13 oz)   Height: 5' 2" (1.575 m)     "

## 2017-08-11 ENCOUNTER — OFFICE VISIT (OUTPATIENT)
Dept: UROLOGY | Facility: CLINIC | Age: 67
End: 2017-08-11
Payer: MEDICARE

## 2017-08-11 VITALS
DIASTOLIC BLOOD PRESSURE: 68 MMHG | SYSTOLIC BLOOD PRESSURE: 122 MMHG | BODY MASS INDEX: 45.4 KG/M2 | HEIGHT: 62 IN | WEIGHT: 246.69 LBS | HEART RATE: 72 BPM

## 2017-08-11 DIAGNOSIS — N95.2 VAGINAL ATROPHY: ICD-10-CM

## 2017-08-11 DIAGNOSIS — N39.0 RECURRENT UTI: Primary | ICD-10-CM

## 2017-08-11 PROCEDURE — 1126F AMNT PAIN NOTED NONE PRSNT: CPT | Mod: S$GLB,,, | Performed by: UROLOGY

## 2017-08-11 PROCEDURE — 99213 OFFICE O/P EST LOW 20 MIN: CPT | Mod: 25,S$GLB,, | Performed by: UROLOGY

## 2017-08-11 PROCEDURE — 3078F DIAST BP <80 MM HG: CPT | Mod: S$GLB,,, | Performed by: UROLOGY

## 2017-08-11 PROCEDURE — 99999 PR PBB SHADOW E&M-EST. PATIENT-LVL V: CPT | Mod: PBBFAC,,, | Performed by: UROLOGY

## 2017-08-11 PROCEDURE — 3074F SYST BP LT 130 MM HG: CPT | Mod: S$GLB,,, | Performed by: UROLOGY

## 2017-08-11 PROCEDURE — 1159F MED LIST DOCD IN RCRD: CPT | Mod: S$GLB,,, | Performed by: UROLOGY

## 2017-08-11 PROCEDURE — 81002 URINALYSIS NONAUTO W/O SCOPE: CPT | Mod: S$GLB,,, | Performed by: UROLOGY

## 2017-08-11 NOTE — PROGRESS NOTES
CHIEF COMPLAINT:    Mrs. Jose is a 67 y.o. female presenting for a follow up on recurrent UTI, interstitial cystitis    PRESENTING ILLNESS:    Vivienne Jose is a 67 y.o. female who returns stating that she had one UTI that was treated for 3 days, had another episode of lower abdominal pain and malodorous urine when she was out of town and took one left over antibiotic and it resolved.  She continues to use the Premarin cream and she states her bowels are under good control.  She feels well overall.    REVIEW OF SYSTEMS:    Review of Systems   Constitutional: Negative.    HENT: Negative.    Eyes: Negative.    Respiratory: Negative.    Cardiovascular: Negative.    Gastrointestinal: Negative for constipation.   Genitourinary: Negative.    Musculoskeletal: Negative.    Skin: Negative.    Neurological: Negative.    Endo/Heme/Allergies: Negative.    Psychiatric/Behavioral: Negative.      PATIENT HISTORY:    Past Medical History:   Diagnosis Date    Allergy     Angio-edema     Arthritis     Cataract     Cerebrovascular malformation     Colon polyps     Coronary artery disease     Diabetes mellitus, type 2     Diabetic peripheral neuropathy     GERD (gastroesophageal reflux disease)     Herpes infection     Hyperlipidemia     Hypertension     Hypothyroidism     Kidney stones     Nausea & vomiting 11/23/2015    Obesity, morbid     Ovarian cyst     Pyelonephritis, chronic     Seizure disorder, focal motor     Sleep apnea     Type II or unspecified type diabetes mellitus with neurological manifestations, uncontrolled     Urinary tract infection     Urticaria     Vaginal infection        Past Surgical History:   Procedure Laterality Date    CARPAL TUNNEL RELEASE Right 2014    COLONOSCOPY      CYST REMOVAL      EXTRACORPOREAL SHOCK WAVE LITHOTRIPSY  2002    HYSTERECTOMY  1984    THYROIDECTOMY  1977         TONSILLECTOMY, ADENOIDECTOMY      trigger finder       TUBAL LIGATION         Family  History   Problem Relation Age of Onset    Cancer Father     Allergies Son     Skin cancer Mother     Macular degeneration Mother     Dementia Mother     Glaucoma Maternal Aunt      Great Maternal Aunt    Leukemia Maternal Uncle      Social History    Marital status:      Occupational History     [employer]     Social History Main Topics    Smoking status: Never Smoker    Smokeless tobacco: Never Used    Alcohol use No    Drug use: No    Sexual activity: Not Currently       Allergies:  Sulfa (sulfonamide antibiotics); Ciprofloxacin; Januvia [sitagliptin]; Lisinopril; Lotensin [benazepril]; and Nexium [esomeprazole magnesium]    Medications:  Outpatient Encounter Prescriptions as of 8/11/2017   Medication Sig Dispense Refill    amlodipine (NORVASC) 5 MG tablet Take 1 tablet (5 mg total) by mouth once daily. 90 tablet 1    aspirin (ECOTRIN) 81 MG EC tablet Take 81 mg by mouth once daily.      biotin 10,000 mcg Cap Take by mouth.      blood sugar diagnostic (BLOOD GLUCOSE TEST) Strp 1 strip by Misc.(Non-Drug; Combo Route) route 2 (two) times daily. [employer] True Metrix test strips 300 strip 3    blood-glucose meter Misc [employer] True Metrix Air meter 1 each 0    calcium carbonate-vitamin D3 600 mg(1,500mg) -100 unit Cap Take 1 tablet by mouth once daily.      fluticasone (FLONASE) 50 mcg/actuation nasal spray 2 sprays by Each Nare route once daily. 16 g 12    gabapentin (NEURONTIN) 300 MG capsule Take 1 capsule (300 mg total) by mouth every evening. 90 capsule 0    glimepiride (AMARYL) 4 MG tablet Take 1 tablet (4 mg total) by mouth daily with breakfast. 90 tablet 3    hydrochlorothiazide (HYDRODIURIL) 25 MG tablet Take 1 tablet (25 mg total) by mouth once daily. 90 tablet 3    hydrocodone-acetaminophen 5-325mg (NORCO) 5-325 mg per tablet Take 1 tablet by mouth every 6 (six) hours as needed for Pain. 30 tablet 0    L.acid-B.bifidum-B.animal-FOS (PROBIOTIC COMPLEX) 25 billion cell  "-100 mg Cap Take 1 capsule by mouth once daily.      lancets 28 gauge Misc 1 lancet by Misc.(Non-Drug; Combo Route) route 2 (two) times daily. For True Metrix meter 300 each 1    LEVEMIR FLEXTOUCH 100 unit/mL (3 mL) InPn pen Inject 14 Units into the skin once daily.      levothyroxine (SYNTHROID) 75 MCG tablet Take 1 tablet (75 mcg total) by mouth once daily. 90 tablet 1    loratadine (CLARITIN) 10 mg tablet Take 10 mg by mouth once daily.      lovastatin (MEVACOR) 40 MG tablet TAKE 1 TABLET  NIGHTLY 90 tablet 1    magnesium oxide-Mg AA chelate (MAGNESIUM, AMINO ACID CHELATE,) 133 mg Tab Take by mouth as directed.       metformin (GLUCOPHAGE) 1000 MG tablet TAKE 1 TABLET TWICE DAILY WITH MEALS 180 tablet 0    omeprazole (PRILOSEC) 20 MG capsule TAKE 1 CAPSULE EVERY DAY AS NEEDED  FOR  GERD 90 capsule 1    pen needle, diabetic (BD ULTRA-FINE EDUARDO PEN NEEDLES) 32 gauge x 5/32" Ndle Use with victoza and levemir, 2 injections daily 200 each 3    UNABLE TO FIND Zatador eye drops for allergies      valacyclovir (VALTREX) 500 MG tablet TAKE 1 TABLET ONE TIME DAILY 90 tablet 1    valsartan (DIOVAN) 160 MG tablet Take 1 tablet (160 mg total) by mouth 2 (two) times daily. 180 tablet 0    azelastine (ASTELIN) 137 mcg (0.1 %) nasal spray 1 spray (137 mcg total) by Nasal route daily as needed for Rhinitis. 90 mL 1    conjugated estrogens (PREMARIN) vaginal cream Place 0.5 g vaginally 3 (three) times a week. 30 g 3    diclofenac sodium (VOLTAREN) 1 % Gel Apply 2 g topically 4 (four) times daily. 3 Tube 3    econazole nitrate 1 % cream Apply topically once daily. 60 g 3    polyethylene glycol (GOLYTELY,NULYTELY) 236-22.74-6.74 -5.86 gram suspension Take 4,000 mLs (4 L total) by mouth As instructed. 4000 mL 0     Facility-Administered Encounter Medications as of 8/11/2017   Medication Dose Route Frequency Provider Last Rate Last Dose    Allergy Mix   Subcutaneous 1 time in Clinic/HOD Ailin Mills MD     "         PHYSICAL EXAMINATION:    The patient generally appears in good health, is appropriately interactive, and is in no apparent distress.    Skin: No lesions.    Mental: Cooperative with normal affect.    Neuro: Grossly intact.    HEENT: Normal. No evidence of lymphadenopathy.    Chest:  normal inspiratory effort.    Abdomen:  Soft, non-tender. No masses or organomegaly. Bladder is not palpable. No evidence of flank discomfort. No evidence of inguinal hernia.    Extremities: No clubbing, cyanosis, or edema    Normal external female genitalia  Grade I urogenital atrophy, but she is doing a good job with the Premarin cream  Urethral meatus is normal  Urethra and bladder are nontender to bimanual exam  Well supported anteriorly and posteriorly   Uterus and cervix are surgically absent  No adnexal masses    LABS:    Lab Results   Component Value Date    BUN 18 04/12/2017    BUN 18 04/12/2017    CREATININE 0.9 04/12/2017    CREATININE 0.9 04/12/2017     UA 1.015, pH 6, otherwise, negative    IMPRESSION:    Encounter Diagnoses   Name Primary?    Recurrent UTI Yes    Vaginal atrophy        PLAN:    1. Continue Premarin cream  2.  OK to use AZO Standard and if the symptoms do not resolve, contact us so a urine culture can be done.  3.  Follow up in 6 months

## 2017-08-12 ENCOUNTER — PATIENT MESSAGE (OUTPATIENT)
Dept: ALLERGY | Facility: CLINIC | Age: 67
End: 2017-08-12

## 2017-08-18 NOTE — TELEPHONE ENCOUNTER
I understand I know system is not perfect, dos that mean it can be ordered? Can pt be notified of a time line?

## 2017-08-22 ENCOUNTER — OFFICE VISIT (OUTPATIENT)
Dept: SURGERY | Facility: CLINIC | Age: 67
End: 2017-08-22
Payer: MEDICARE

## 2017-08-22 ENCOUNTER — CLINICAL SUPPORT (OUTPATIENT)
Dept: ALLERGY | Facility: CLINIC | Age: 67
End: 2017-08-22
Payer: MEDICARE

## 2017-08-22 VITALS
TEMPERATURE: 98 F | HEIGHT: 62 IN | WEIGHT: 246.56 LBS | BODY MASS INDEX: 45.37 KG/M2 | DIASTOLIC BLOOD PRESSURE: 71 MMHG | SYSTOLIC BLOOD PRESSURE: 137 MMHG | HEART RATE: 74 BPM

## 2017-08-22 DIAGNOSIS — J30.9 CHRONIC ALLERGIC RHINITIS, UNSPECIFIED SEASONALITY, UNSPECIFIED TRIGGER: ICD-10-CM

## 2017-08-22 DIAGNOSIS — L72.9 FOLLICULAR CYST OF SKIN: Primary | ICD-10-CM

## 2017-08-22 DIAGNOSIS — J30.89 CHRONIC NON-SEASONAL ALLERGIC RHINITIS, UNSPECIFIED TRIGGER: ICD-10-CM

## 2017-08-22 DIAGNOSIS — M79.89 SOFT TISSUE MASS: ICD-10-CM

## 2017-08-22 PROCEDURE — 99499 UNLISTED E&M SERVICE: CPT | Mod: S$GLB,,, | Performed by: ALLERGY & IMMUNOLOGY

## 2017-08-22 PROCEDURE — 99999 PR PBB SHADOW E&M-EST. PATIENT-LVL III: CPT | Mod: PBBFAC,,, | Performed by: SURGERY

## 2017-08-22 PROCEDURE — 95165 ANTIGEN THERAPY SERVICES: CPT | Mod: S$GLB,,, | Performed by: ALLERGY & IMMUNOLOGY

## 2017-08-22 PROCEDURE — 99024 POSTOP FOLLOW-UP VISIT: CPT | Mod: S$GLB,,, | Performed by: SURGERY

## 2017-08-22 NOTE — PROGRESS NOTES
Subjective:       Patient ID: Vivienne Jose is a 67 y.o. female.    Chief Complaint: No chief complaint on file.    HPI This is a 66 yo W who presents for suture removal after a small nodule was removed at her left axilla. She has been doing well since the excision of the skin lesion. She did note a low grade temp a few days after the procedure was done but also was congested and attributed her low grade temp to her sinus issues. She denies any erythema, edema, or purulence. She has kept the incision dry and clean.    Review of Systems   Constitutional: Positive for fever. Negative for activity change, appetite change and unexpected weight change.   HENT: Positive for congestion and sinus pressure. Negative for sneezing and voice change.    Respiratory: Negative for chest tightness and shortness of breath.    Cardiovascular: Negative.    Gastrointestinal: Negative.    Genitourinary: Negative.    Skin: Negative for color change, rash and wound.   Neurological: Negative for weakness.         Objective:      Physical Exam   Constitutional: She is oriented to person, place, and time. She appears well-developed and well-nourished.   HENT:   Head: Normocephalic and atraumatic.   Eyes: EOM are normal.   Neck: Normal range of motion. Neck supple.   Pulmonary/Chest: Effort normal and breath sounds normal.   Abdominal: Soft. Bowel sounds are normal.   Musculoskeletal: Normal range of motion.   Lymphadenopathy:   Left axillary wound is well-approximated; no erythema or purulence; stitches removed   Neurological: She is alert and oriented to person, place, and time.   Skin: Skin is warm and dry.   Psychiatric: She has a normal mood and affect.       FINAL PATHOLOGIC DIAGNOSIS  1. Skin, left axilla, soft tissue mass, excision:  - FOLLICULAR CYST, INFUNDIBULAR TYPE.  MICROSCOPIC DESCRIPTION: Sections show a cyst lined by keratinizing squamous epithelium showing a visible  granular layer and filled with loose laminated  keratin.  Assessment:     66 yo W with benign follicular cyst s/p excision, doing well    Plan:   -Ok to bathe and swim; no need to keep incision covered  -Return to clinic as needed; all questions and concerned addressed     Elise Pang MD  General Surgery  PGY-4  [phone] (pager)

## 2017-08-23 ENCOUNTER — TELEPHONE (OUTPATIENT)
Dept: ENDOCRINOLOGY | Facility: CLINIC | Age: 67
End: 2017-08-23

## 2017-08-23 PROBLEM — M79.89 SOFT TISSUE MASS: Status: RESOLVED | Noted: 2017-08-04 | Resolved: 2017-08-23

## 2017-08-23 NOTE — TELEPHONE ENCOUNTER
----- Message from Marielle Fountain sent at 8/21/2017  9:04 AM CDT -----  Contact: Pt can be reached at [phone]  Pt is calling to schedule her appt for follow up appt.  I tried to schedule appt no date available.    Thank you!

## 2017-08-31 ENCOUNTER — PATIENT OUTREACH (OUTPATIENT)
Dept: OTHER | Facility: OTHER | Age: 67
End: 2017-08-31

## 2017-08-31 NOTE — PROGRESS NOTES
"Last 5 Patient Entered Redings Current 30 Day Average: 141/70     Recent Readings 8/30/2017 8/29/2017 8/28/2017 8/27/2017 8/27/2017    Systolic BP (mmHg) 143 143 145 143 152    Diastolic BP (mmHg) 61 68 73 69 68    Pulse 73 73 68 72 75        Hypertension Digital Medicine Program (HDMP): Health  Follow Up    Lifestyle Modifications:    1. Low sodium diet: no Patient reports that she has started cooking at least one day at home. She does not add salt to prepared foods.      2.Physical activity: no Ms. Jose has been trying to stay active around her home.     3.Hypotension/Hypertension symptoms: no   Frequency/Alleviating factors/Precipitating factors, etc.     4. Patient has been compliant with the medicaiton regimen    Follow up with Ms. Vivienne Jose completed. Ms. Jose is doing well. Patient is still reluctant to make singificant changes to her diet. However, she has started eating at least one meal a week at home. Patient states that "things are moving along" and that her BP is "staying about the same". No further questions or concerns. I will follow up in a few weeks to assess progress.         "

## 2017-09-05 DIAGNOSIS — N95.2 VAGINAL ATROPHY: ICD-10-CM

## 2017-09-08 ENCOUNTER — TELEPHONE (OUTPATIENT)
Dept: NEPHROLOGY | Facility: CLINIC | Age: 67
End: 2017-09-08

## 2017-09-08 ENCOUNTER — LAB VISIT (OUTPATIENT)
Dept: LAB | Facility: HOSPITAL | Age: 67
End: 2017-09-08
Attending: INTERNAL MEDICINE
Payer: MEDICARE

## 2017-09-08 DIAGNOSIS — Z79.4 TYPE 2 DIABETES MELLITUS WITH DIABETIC POLYNEUROPATHY, WITH LONG-TERM CURRENT USE OF INSULIN: ICD-10-CM

## 2017-09-08 DIAGNOSIS — I10 ESSENTIAL HYPERTENSION: ICD-10-CM

## 2017-09-08 DIAGNOSIS — Z00.00 PREVENTATIVE HEALTH CARE: ICD-10-CM

## 2017-09-08 DIAGNOSIS — E11.42 TYPE 2 DIABETES MELLITUS WITH DIABETIC POLYNEUROPATHY, WITH LONG-TERM CURRENT USE OF INSULIN: ICD-10-CM

## 2017-09-08 DIAGNOSIS — R20.9 DISTURBANCE OF SKIN SENSATION: ICD-10-CM

## 2017-09-08 DIAGNOSIS — N20.0 KIDNEY STONES: ICD-10-CM

## 2017-09-08 DIAGNOSIS — Z11.59 NEED FOR HEPATITIS C SCREENING TEST: ICD-10-CM

## 2017-09-08 DIAGNOSIS — E11.9 TYPE 2 DIABETES MELLITUS WITHOUT RETINOPATHY: ICD-10-CM

## 2017-09-08 DIAGNOSIS — E11.59 HYPERTENSION ASSOCIATED WITH DIABETES: ICD-10-CM

## 2017-09-08 DIAGNOSIS — I15.2 HYPERTENSION ASSOCIATED WITH DIABETES: ICD-10-CM

## 2017-09-08 DIAGNOSIS — E78.5 HYPERLIPIDEMIA, UNSPECIFIED HYPERLIPIDEMIA TYPE: ICD-10-CM

## 2017-09-08 DIAGNOSIS — E87.6 HYPOKALEMIA: ICD-10-CM

## 2017-09-08 DIAGNOSIS — E03.9 HYPOTHYROIDISM, UNSPECIFIED TYPE: ICD-10-CM

## 2017-09-08 LAB
ALBUMIN SERPL BCP-MCNC: 3.1 G/DL
ALBUMIN SERPL BCP-MCNC: 3.1 G/DL
ALP SERPL-CCNC: 96 U/L
ALT SERPL W/O P-5'-P-CCNC: 21 U/L
ANION GAP SERPL CALC-SCNC: 11 MMOL/L
ANION GAP SERPL CALC-SCNC: 11 MMOL/L
AST SERPL-CCNC: 22 U/L
BASOPHILS # BLD AUTO: 0.02 K/UL
BASOPHILS NFR BLD: 0.2 %
BILIRUB SERPL-MCNC: 0.4 MG/DL
BUN SERPL-MCNC: 14 MG/DL
BUN SERPL-MCNC: 14 MG/DL
CALCIUM SERPL-MCNC: 9.3 MG/DL
CALCIUM SERPL-MCNC: 9.3 MG/DL
CHLORIDE SERPL-SCNC: 101 MMOL/L
CHLORIDE SERPL-SCNC: 101 MMOL/L
CHOLEST SERPL-MCNC: 153 MG/DL
CHOLEST/HDLC SERPL: 3.3 {RATIO}
CO2 SERPL-SCNC: 28 MMOL/L
CO2 SERPL-SCNC: 28 MMOL/L
CREAT SERPL-MCNC: 0.8 MG/DL
CREAT SERPL-MCNC: 0.8 MG/DL
DIFFERENTIAL METHOD: ABNORMAL
EOSINOPHIL # BLD AUTO: 0.2 K/UL
EOSINOPHIL NFR BLD: 2.6 %
ERYTHROCYTE [DISTWIDTH] IN BLOOD BY AUTOMATED COUNT: 15 %
EST. GFR  (AFRICAN AMERICAN): >60 ML/MIN/1.73 M^2
EST. GFR  (AFRICAN AMERICAN): >60 ML/MIN/1.73 M^2
EST. GFR  (NON AFRICAN AMERICAN): >60 ML/MIN/1.73 M^2
EST. GFR  (NON AFRICAN AMERICAN): >60 ML/MIN/1.73 M^2
ESTIMATED AVG GLUCOSE: 157 MG/DL
GLUCOSE SERPL-MCNC: 152 MG/DL
GLUCOSE SERPL-MCNC: 152 MG/DL
HBA1C MFR BLD HPLC: 7.1 %
HCT VFR BLD AUTO: 41.8 %
HDLC SERPL-MCNC: 46 MG/DL
HDLC SERPL: 30.1 %
HGB BLD-MCNC: 13.7 G/DL
LDLC SERPL CALC-MCNC: 76.8 MG/DL
LYMPHOCYTES # BLD AUTO: 2.2 K/UL
LYMPHOCYTES NFR BLD: 26.9 %
MCH RBC QN AUTO: 30.3 PG
MCHC RBC AUTO-ENTMCNC: 32.8 G/DL
MCV RBC AUTO: 93 FL
MONOCYTES # BLD AUTO: 0.7 K/UL
MONOCYTES NFR BLD: 9.1 %
NEUTROPHILS # BLD AUTO: 4.9 K/UL
NEUTROPHILS NFR BLD: 61 %
NONHDLC SERPL-MCNC: 107 MG/DL
PHOSPHATE SERPL-MCNC: 3.4 MG/DL
PLATELET # BLD AUTO: 290 K/UL
PMV BLD AUTO: 9.9 FL
POTASSIUM SERPL-SCNC: 3.8 MMOL/L
POTASSIUM SERPL-SCNC: 3.8 MMOL/L
PROT SERPL-MCNC: 7.8 G/DL
RBC # BLD AUTO: 4.52 M/UL
SODIUM SERPL-SCNC: 140 MMOL/L
SODIUM SERPL-SCNC: 140 MMOL/L
TRIGL SERPL-MCNC: 151 MG/DL
TSH SERPL DL<=0.005 MIU/L-ACNC: 3.06 UIU/ML
VIT B12 SERPL-MCNC: 322 PG/ML
WBC # BLD AUTO: 8.03 K/UL

## 2017-09-08 PROCEDURE — 80053 COMPREHEN METABOLIC PANEL: CPT

## 2017-09-08 PROCEDURE — 84100 ASSAY OF PHOSPHORUS: CPT

## 2017-09-08 PROCEDURE — 86803 HEPATITIS C AB TEST: CPT

## 2017-09-08 PROCEDURE — 83036 HEMOGLOBIN GLYCOSYLATED A1C: CPT

## 2017-09-08 PROCEDURE — 84443 ASSAY THYROID STIM HORMONE: CPT

## 2017-09-08 PROCEDURE — 82607 VITAMIN B-12: CPT

## 2017-09-08 PROCEDURE — 36415 COLL VENOUS BLD VENIPUNCTURE: CPT | Mod: PO

## 2017-09-08 PROCEDURE — 85025 COMPLETE CBC W/AUTO DIFF WBC: CPT

## 2017-09-08 PROCEDURE — 80061 LIPID PANEL: CPT

## 2017-09-08 NOTE — TELEPHONE ENCOUNTER
Another pt with labs and no follow up appointment. I am hoping we are adding these pts to wait list so they can be seen soon and do we know yet why so many patients are getting labs without follow up appointments?

## 2017-09-11 LAB
HCV AB SERPL QL IA: NEGATIVE
HCV AB SERPL QL IA: NEGATIVE

## 2017-09-13 ENCOUNTER — SURGERY (OUTPATIENT)
Age: 67
End: 2017-09-13

## 2017-09-13 ENCOUNTER — ANESTHESIA (OUTPATIENT)
Dept: ENDOSCOPY | Facility: HOSPITAL | Age: 67
End: 2017-09-13
Payer: MEDICARE

## 2017-09-13 ENCOUNTER — HOSPITAL ENCOUNTER (OUTPATIENT)
Facility: HOSPITAL | Age: 67
Discharge: HOME | End: 2017-09-13
Attending: INTERNAL MEDICINE | Admitting: INTERNAL MEDICINE
Payer: MEDICARE

## 2017-09-13 ENCOUNTER — TELEPHONE (OUTPATIENT)
Dept: ENDOCRINOLOGY | Facility: CLINIC | Age: 67
End: 2017-09-13

## 2017-09-13 ENCOUNTER — ANESTHESIA EVENT (OUTPATIENT)
Dept: ENDOSCOPY | Facility: HOSPITAL | Age: 67
End: 2017-09-13
Payer: MEDICARE

## 2017-09-13 DIAGNOSIS — Z86.010 HISTORY OF COLON POLYPS: Primary | ICD-10-CM

## 2017-09-13 DIAGNOSIS — R22.9 SKIN NODULE: ICD-10-CM

## 2017-09-13 PROCEDURE — 88305 TISSUE EXAM BY PATHOLOGIST: CPT | Performed by: PATHOLOGY

## 2017-09-13 PROCEDURE — 45380 COLONOSCOPY AND BIOPSY: CPT | Mod: PT,,, | Performed by: INTERNAL MEDICINE

## 2017-09-13 PROCEDURE — 45380 COLONOSCOPY AND BIOPSY: CPT | Performed by: INTERNAL MEDICINE

## 2017-09-13 PROCEDURE — 88305 TISSUE EXAM BY PATHOLOGIST: CPT | Mod: 26,,, | Performed by: PATHOLOGY

## 2017-09-13 PROCEDURE — 45385 COLONOSCOPY W/LESION REMOVAL: CPT | Performed by: INTERNAL MEDICINE

## 2017-09-13 PROCEDURE — 27201089 HC SNARE, DISP (ANY): Performed by: INTERNAL MEDICINE

## 2017-09-13 PROCEDURE — 63600175 PHARM REV CODE 636 W HCPCS: Performed by: NURSE ANESTHETIST, CERTIFIED REGISTERED

## 2017-09-13 PROCEDURE — 25000003 PHARM REV CODE 250: Performed by: INTERNAL MEDICINE

## 2017-09-13 PROCEDURE — 37000009 HC ANESTHESIA EA ADD 15 MINS: Performed by: INTERNAL MEDICINE

## 2017-09-13 PROCEDURE — 37000008 HC ANESTHESIA 1ST 15 MINUTES: Performed by: INTERNAL MEDICINE

## 2017-09-13 RX ORDER — PROPOFOL 10 MG/ML
VIAL (ML) INTRAVENOUS CONTINUOUS PRN
Status: DISCONTINUED | OUTPATIENT
Start: 2017-09-13 | End: 2017-09-13

## 2017-09-13 RX ORDER — SODIUM CHLORIDE 9 MG/ML
INJECTION, SOLUTION INTRAVENOUS CONTINUOUS
Status: DISCONTINUED | OUTPATIENT
Start: 2017-09-13 | End: 2017-09-13 | Stop reason: HOSPADM

## 2017-09-13 RX ORDER — LIDOCAINE HCL/PF 100 MG/5ML
SYRINGE (ML) INTRAVENOUS
Status: DISCONTINUED | OUTPATIENT
Start: 2017-09-13 | End: 2017-09-13

## 2017-09-13 RX ORDER — ONDANSETRON 2 MG/ML
4 INJECTION INTRAMUSCULAR; INTRAVENOUS ONCE
Status: DISCONTINUED | OUTPATIENT
Start: 2017-09-13 | End: 2017-09-13 | Stop reason: HOSPADM

## 2017-09-13 RX ORDER — PROPOFOL 10 MG/ML
VIAL (ML) INTRAVENOUS
Status: DISCONTINUED | OUTPATIENT
Start: 2017-09-13 | End: 2017-09-13

## 2017-09-13 RX ADMIN — LIDOCAINE HYDROCHLORIDE 80 MG: 20 INJECTION, SOLUTION INTRAVENOUS at 01:09

## 2017-09-13 RX ADMIN — SODIUM CHLORIDE: 0.9 INJECTION, SOLUTION INTRAVENOUS at 12:09

## 2017-09-13 RX ADMIN — PROPOFOL 125 MCG/KG/MIN: 10 INJECTION, EMULSION INTRAVENOUS at 01:09

## 2017-09-13 RX ADMIN — PROPOFOL 50 MG: 10 INJECTION, EMULSION INTRAVENOUS at 01:09

## 2017-09-13 NOTE — ANESTHESIA POSTPROCEDURE EVALUATION
"Anesthesia Post Evaluation    Patient: Vivienne Jose    Procedure(s) Performed: Procedure(s) (LRB):  COLONOSCOPY Golytely (N/A)    Final Anesthesia Type: MAC  Patient location during evaluation: GI PACU  Patient participation: Yes- Able to Participate  Level of consciousness: awake and alert  Post-procedure vital signs: reviewed and stable  Airway patency: patent  PONV status at discharge: No PONV  Anesthetic complications: no      Cardiovascular status: blood pressure returned to baseline and hemodynamically stable  Respiratory status: unassisted, spontaneous ventilation and room air  Hydration status: euvolemic  Follow-up not needed.        Visit Vitals  BP (!) 150/72   Pulse 76   Temp 36.4 °C (97.6 °F)   Resp 20   Ht 5' 2.75" (1.594 m)   Wt 110.7 kg (244 lb)   LMP  (LMP Unknown)   SpO2 96%   Breastfeeding? No   BMI 43.57 kg/m²       Pain/Gordo Score: No Data Recorded      "

## 2017-09-13 NOTE — TRANSFER OF CARE
"Anesthesia Transfer of Care Note    Patient: Vivienne Jose    Procedure(s) Performed: Procedure(s) (LRB):  COLONOSCOPY Golytely (N/A)    Patient location: GI    Anesthesia Type: MAC    Transport from OR: Transported from OR on room air with adequate spontaneous ventilation    Post pain: adequate analgesia    Post assessment: no apparent anesthetic complications and tolerated procedure well    Post vital signs: stable    Level of consciousness: awake, alert and oriented    Nausea/Vomiting: no nausea/vomiting    Complications: none    Transfer of care protocol was followed      Last vitals:   Visit Vitals  BP (!) 150/72   Pulse 76   Temp 36.4 °C (97.6 °F)   Resp 20   Ht 5' 2.75" (1.594 m)   Wt 110.7 kg (244 lb)   LMP  (LMP Unknown)   SpO2 96%   Breastfeeding? No   BMI 43.57 kg/m²     "

## 2017-09-13 NOTE — TELEPHONE ENCOUNTER
----- Message from Carolin Mireles sent at 2017  2:31 PM CDT -----  Contact: Vivienne    [phone]    Pt.says she needs an appt. F/u w/ Dr. Bullard for December.    She states they usually get an appt. Together.  :  Tucker Jose   :  1945  .   Both have see Dr. Bullard previously.    Needs a 6 mos. Check up.   Also will need to do labs before seeing the . .    Would like to have early a.m. Appts. If possible.   Pls call ref. This.  (no access)

## 2017-09-13 NOTE — ANESTHESIA PREPROCEDURE EVALUATION
09/13/2017  Vivienne Jose is a 67 y.o., female for colonoscopy under MAC. Pt is morbidly obese with sleep apnea        Pre-op Assessment    I have reviewed the Patient Summary Reports.     I have reviewed the Nursing Notes.   I have reviewed the Medications.     Review of Systems  Anesthesia Hx:  Hx of Anesthetic complications  History of prior surgery of interest to airway management or planning: Previous anesthesia: General, MAC   Procedure performed at an [facility]. Denies Family Hx of Anesthesia complications.  Personal Hx of Anesthesia complications, Post-Operative Nausea/Vomiting (pt with hx of severe motion sickness and PONV with every procedure), in the past, but not with recent anesthetics / prophylaxis   Social:  Non-Smoker, No Alcohol Use    Hematology/Oncology:  Hematology Normal        EENT/Dental:   chronic allergic rhinitis   Cardiovascular:   Exercise tolerance: good Hypertension, well controlled CAD   hyperlipidemia  Functional Capacity 4 METS    Pulmonary:   Sleep Apnea, CPAP    Renal/:   renal calculi Recent and chronic UTI; pt will start on Macrobid per Dr. Lyons today     Hepatic/GI:   GERD, well controlled    Neurological:   Peripheral Neuropathy  Seizure Disorder, Absence ([place]) , Most recent seizure occurred >1 year ago States has not had a seizure in 40 years; not on medication   Endocrine:   Diabetes (improving control), poorly controlled, type 2 Hypothyroidism  Diabetes, Type 2 Diabetes , Complications include Diabetic Neuropathy, peripheral sensory neuropathy , controlled by oral hypoglycemics, non-insulin injectables. , most recent HgA1c value was 7.0 on 12/1/2016.        Physical Exam  General:  Morbid Obesity    Airway/Jaw/Neck:  Airway Findings: Mouth Opening: Small, but > 3cm Tongue: Normal  General Airway Assessment: Adult  Mallampati: II  TM Distance: Normal,  at least 6 cm  Jaw/Neck Findings:  Neck ROM: Normal ROM  Neck Findings:  Girth Increased      Dental:  Dental Findings: Periodontal disease, Severe   Chest/Lungs:  Chest/Lungs Clear    Heart/Vascular:  Heart Findings: Normal Heart murmur: negative       Mental Status:  Mental Status Findings:  Alert and Oriented       EKG 12/14/2016  Normal sinus rhythm  Left axis deviation  Right bundle branch block  Abnormal ECG  When compared with ECG of 29-SEP-2015 13:29,  No significant change was found  Confirmed by Rg BEATTY, Maya ([id number]) on 12/15/2016 8:10:10 PM    2D Echo w/CFD 10/2015  CONCLUSIONS     1 - Normal left ventricular systolic function (EF 60-65%).     2 - Concentric hypertrophy.     3 - Normal left ventricular diastolic function.     4 - Normal right ventricular systolic function .     5 - The estimated PA systolic pressure is 14 mmHg.   This document has been electronically    SIGNED BY: Maya Diez MD On: 10/12/2015 12:42    Anesthesia Plan  Type of Anesthesia, risks & benefits discussed:  Anesthesia Type:  MAC  Patient's Preference:   Intra-op Monitoring Plan:   Intra-op Monitoring Plan Comments:   Post Op Pain Control Plan:   Post Op Pain Control Plan Comments:   Induction:    Beta Blocker:  Patient is not currently on a Beta-Blocker (No further documentation required).       Informed Consent: Patient understands risks and agrees with Anesthesia plan.  Questions answered. Anesthesia consent signed with patient.  ASA Score: 3     Day of Surgery Review of History & Physical:        Anesthesia Plan Notes: zofran pre-procedure        Ready For Surgery From Anesthesia Perspective.

## 2017-09-13 NOTE — DISCHARGE INSTRUCTIONS
Discharge Summary/Instructions for after Colonoscopy with Biopsy/Polypectomy    Vivienne Jose  9/13/2017  Gisela Wall MD    Restrictions on Activity:    - Do not drive car or operate machinery until the day after the procedure.  - The following day: return to full activity including work.  - For 3 days: No heavy lifting, straining or running.  - Diet: Eat and drink normally unless instructed otherwise.    Treatment for Common Side Effects:  - Mild abdominal pain and bloating or excessive gas: rest, eat lightly and use a heating pad.     Symptoms to watch for and report to your physician:  1. Severe abdominal pain.  2. Fever within 24 hours after a procedure.  3. A large amount of rectal bleeding. (A small amount of blood from the rectum is not serious, especially if hemorrhoids are present.)  4. Because air was put into your colon during the procedure, expelling large amount of air from your rectum is normal.  5. You may not have a bowel movement for 1-3 days because of the colonoscopy prep. This is normal.  6. Go directly to the emergency room if you notice any of the following:     Chills and/or fever over 101   Persistent vomiting   Severe abdominal pain, other than gas cramps   Severe chest pain   Black, tarry stools   Any bleeding - exceeding one tablespoon    If you have any questions or problems, please call your Physician:    Gisela Wall MD      Lab Results: Contact Physician's Office      If a complication or emergency situation arises and you are unable to reach your Physician - GO TO THE EMERGENCY ROOM.

## 2017-09-13 NOTE — H&P
History and Physical      Chief complaints: Requesting screening colonscopy    History of Presenting Illness    Patient requesting colonoscopy.  Patient denies any abdominal pain, weight loss or blood in the stool.  There is no family history of colon cancer. Patient has a history of colon polyp    Review of Systems   Constitutional: Negative for fever and appetite change.   HENT: Negative for sore throat, trouble swallowing and neck pain.   Eyes: Negative for photophobia and visual disturbance.   Respiratory: Negative for wheezing.   Cardiovascular: Negative for chest pain and palpitations.   Gastrointestinal: See HPI for details   Musculoskeletal: Negative for joint swelling and arthralgias.   Skin: Negative for rash and wound.   Neurological: Negative for dizziness, tremors and weakness.   Hematological: Negative.   Psychiatric/Behavioral: Negative for suicidal ideas and behavioral problems.     Past Medical History:   Diagnosis Date    Allergy     Angio-edema     Arthritis     Cataract     Cerebrovascular malformation     Colon polyps     Coronary artery disease     Diabetes mellitus, type 2     Diabetic peripheral neuropathy     GERD (gastroesophageal reflux disease)     Herpes infection     Hyperlipidemia     Hypertension     Hypothyroidism     Kidney stones     Nausea & vomiting 11/23/2015    Obesity, morbid     Ovarian cyst     Pyelonephritis, chronic     Seizure disorder, focal motor     Sleep apnea     Type II or unspecified type diabetes mellitus with neurological manifestations, uncontrolled     Urinary tract infection     Urticaria     Vaginal infection        Past Surgical History:   Procedure Laterality Date    CARPAL TUNNEL RELEASE Right 2014    COLONOSCOPY      CYST REMOVAL      EXTRACORPOREAL SHOCK WAVE LITHOTRIPSY  2002    HYSTERECTOMY  1984    THYROIDECTOMY  1977         TONSILLECTOMY, ADENOIDECTOMY      trigger finder       TUBAL LIGATION         Family  History   Problem Relation Age of Onset    Cancer Father     Allergies Son     Skin cancer Mother     Macular degeneration Mother     Dementia Mother     No Known Problems Daughter     No Known Problems Daughter     No Known Problems Daughter     Glaucoma Maternal Aunt      Great Maternal Aunt    Leukemia Maternal Uncle     Amblyopia Neg Hx     Cataracts Neg Hx     Retinal detachment Neg Hx     Strabismus Neg Hx     Diabetes Neg Hx     Hypertension Neg Hx     Stroke Neg Hx     Thyroid disease Neg Hx     Kidney disease Neg Hx        Social History     Social History    Marital status:      Spouse name: N/A    Number of children: N/A    Years of education: N/A     Occupational History     [employer]     Social History Main Topics    Smoking status: Never Smoker    Smokeless tobacco: Never Used    Alcohol use No    Drug use: No    Sexual activity: Not Currently     Other Topics Concern    None     Social History Narrative    None       No current facility-administered medications for this encounter.        Review of patient's allergies indicates:   Allergen Reactions    Sulfa (sulfonamide antibiotics) Nausea Only    Ciprofloxacin     Januvia [sitagliptin]      abd pain    Lisinopril     Lotensin [benazepril]     Nexium [esomeprazole magnesium] Other (See Comments)     Gas       Objective:      Vitals:    09/13/17 1244   BP: (!) 150/72   Pulse:    Resp:    Temp:      Physical Exam   Constitutional: Patient is oriented to person, place, and time. Appears well-nourished.   HENT:   Mouth/Throat: Oropharynx is clear and moist.   Eyes: Pupils are equal, round, and reactive to light.   Neck: Neck supple.   Cardiovascular: Normal heart sounds.   Pulmonary/Chest: Effort normal and breath sounds normal.   Abdominal: Soft. Exhibits no mass. There is no tenderness. There is no guarding.   Musculoskeletal: Normal range of motion.   Lymphadenopathy: Has no cervical adenopathy.    Neurological:Alert and oriented to person, place, and time.   Skin: Skin is warm. No rash noted.   Psychiatric: Has a normal mood and affect.     Assessment:  History of colon polyp    Plan:  Colonoscopy       I have reviewed the patient's medical history in detail and updated the computerized patient record

## 2017-09-14 ENCOUNTER — OFFICE VISIT (OUTPATIENT)
Dept: INTERNAL MEDICINE | Facility: CLINIC | Age: 67
End: 2017-09-14
Payer: MEDICARE

## 2017-09-14 ENCOUNTER — TELEPHONE (OUTPATIENT)
Dept: INTERNAL MEDICINE | Facility: CLINIC | Age: 67
End: 2017-09-14

## 2017-09-14 VITALS
WEIGHT: 243.63 LBS | DIASTOLIC BLOOD PRESSURE: 64 MMHG | HEIGHT: 62 IN | HEART RATE: 64 BPM | BODY MASS INDEX: 44.83 KG/M2 | SYSTOLIC BLOOD PRESSURE: 124 MMHG | OXYGEN SATURATION: 98 %

## 2017-09-14 VITALS
DIASTOLIC BLOOD PRESSURE: 62 MMHG | WEIGHT: 244 LBS | RESPIRATION RATE: 18 BRPM | SYSTOLIC BLOOD PRESSURE: 146 MMHG | TEMPERATURE: 98 F | OXYGEN SATURATION: 96 % | HEART RATE: 79 BPM | HEIGHT: 63 IN | BODY MASS INDEX: 43.23 KG/M2

## 2017-09-14 DIAGNOSIS — Z23 NEEDS FLU SHOT: ICD-10-CM

## 2017-09-14 DIAGNOSIS — K21.9 GASTROESOPHAGEAL REFLUX DISEASE, ESOPHAGITIS PRESENCE NOT SPECIFIED: ICD-10-CM

## 2017-09-14 DIAGNOSIS — G47.33 OSA ON CPAP: ICD-10-CM

## 2017-09-14 DIAGNOSIS — R00.2 PALPITATIONS: ICD-10-CM

## 2017-09-14 DIAGNOSIS — R07.9 CHEST PAIN, UNSPECIFIED TYPE: ICD-10-CM

## 2017-09-14 DIAGNOSIS — I15.2 HYPERTENSION ASSOCIATED WITH DIABETES: ICD-10-CM

## 2017-09-14 DIAGNOSIS — E11.59 HYPERTENSION ASSOCIATED WITH DIABETES: ICD-10-CM

## 2017-09-14 DIAGNOSIS — E03.9 HYPOTHYROIDISM, UNSPECIFIED TYPE: ICD-10-CM

## 2017-09-14 DIAGNOSIS — E66.01 MORBID OBESITY WITH BMI OF 45.0-49.9, ADULT: ICD-10-CM

## 2017-09-14 PROCEDURE — 3074F SYST BP LT 130 MM HG: CPT | Mod: S$GLB,,, | Performed by: INTERNAL MEDICINE

## 2017-09-14 PROCEDURE — 3066F NEPHROPATHY DOC TX: CPT | Mod: S$GLB,,, | Performed by: INTERNAL MEDICINE

## 2017-09-14 PROCEDURE — 90662 IIV NO PRSV INCREASED AG IM: CPT | Mod: S$GLB,,, | Performed by: INTERNAL MEDICINE

## 2017-09-14 PROCEDURE — 99214 OFFICE O/P EST MOD 30 MIN: CPT | Mod: 25,S$GLB,, | Performed by: INTERNAL MEDICINE

## 2017-09-14 PROCEDURE — 99999 PR PBB SHADOW E&M-EST. PATIENT-LVL III: CPT | Mod: PBBFAC,,, | Performed by: INTERNAL MEDICINE

## 2017-09-14 PROCEDURE — 3045F PR MOST RECENT HEMOGLOBIN A1C LEVEL 7.0-9.0%: CPT | Mod: S$GLB,,, | Performed by: INTERNAL MEDICINE

## 2017-09-14 PROCEDURE — G0008 ADMIN INFLUENZA VIRUS VAC: HCPCS | Mod: S$GLB,,, | Performed by: INTERNAL MEDICINE

## 2017-09-14 PROCEDURE — 1159F MED LIST DOCD IN RCRD: CPT | Mod: S$GLB,,, | Performed by: INTERNAL MEDICINE

## 2017-09-14 PROCEDURE — 3008F BODY MASS INDEX DOCD: CPT | Mod: S$GLB,,, | Performed by: INTERNAL MEDICINE

## 2017-09-14 PROCEDURE — 3078F DIAST BP <80 MM HG: CPT | Mod: S$GLB,,, | Performed by: INTERNAL MEDICINE

## 2017-09-14 PROCEDURE — 99499 UNLISTED E&M SERVICE: CPT | Mod: S$GLB,,, | Performed by: INTERNAL MEDICINE

## 2017-09-14 PROCEDURE — 1126F AMNT PAIN NOTED NONE PRSNT: CPT | Mod: S$GLB,,, | Performed by: INTERNAL MEDICINE

## 2017-09-14 NOTE — TELEPHONE ENCOUNTER
Gordon Diez,    Our mutual patient reports chronic chest pain and palpitations/SOB and refused EKG and holter monitor from me on her visit today; she stated she will f/u with you. I advised her to f/u with you but also wanted to let you know, to see if you felt any further workup was warranted; I saw that she had a nuclear stress dated 10/20/15. Thanks. Jay Jay Felton M.D.  [facility]

## 2017-09-14 NOTE — PROGRESS NOTES
Pt. ID: Vivienne Jose is a 67 y.o. female      Chief complaint:   Chief Complaint   Patient presents with    Diabetes     follow up       HPI: Pt. Here for f/u for DM and HTN; of note, on her ROS she listed chest pain and palpitations as an intermittent finding; she sees cardiology who has evaluated her and I advised her to contact cardiology to see if any other workup is warranted; I explained in detail risks of non-compliance; EKG portion of nuclear stress was positive and nuclear portion was negative for ischemia; she has never had holter and I advised her to discuss that with cardiology as well; she states she had holter many years ago and I asked her to discuss with cardiology if she needs another; pt. States she has had chest pain and palpitations for years and she is not concerned; she denies chest pains and palpitations today; she does not want holter or EKG today; I explained risks of non-compliance and asked her to f/u cardiology; I advised her to proceed to ER for worsening in her condition; she is on prilosec for GERD which helps or anxiety; I reviewed labs dated 9/8/17;  HGBA1C is 7.1 which increased from 6.9; she states diet has been poor and she has been to diabetic teaching; cholesterol is well controlled; TSH is WNL; weight is elevated but stable; she would like flu shot; she uses CPAP nightly       Review of Systems   Eyes: Negative for blurred vision.   Respiratory: Positive for shortness of breath.         With exertion which is a chronic issue   Cardiovascular: Positive for chest pain and palpitations. Negative for orthopnea and PND.   Musculoskeletal: Negative for neck pain.   Neurological: Negative for headaches.         Objective:    Physical Exam   Constitutional: She is oriented to person, place, and time.   Morbid obesity   Eyes: EOM are normal.   Neck: Normal range of motion.   Cardiovascular: Normal rate, regular rhythm and normal heart sounds.    Pulmonary/Chest: Effort normal and  breath sounds normal. No respiratory distress. She has no wheezes. She has no rales.   Abdominal: Soft. There is no tenderness. There is no rebound and no guarding.   Musculoskeletal: Normal range of motion.   Neurological: She is alert and oriented to person, place, and time.   Skin: No rash noted.         Health Maintenance   Topic Date Due    Pneumococcal (65+) (1 of 2 - PCV13) 06/24/2016    Hemoglobin A1c  03/08/2018    Eye Exam  05/04/2018    Foot Exam  08/02/2018    Lipid Panel  09/08/2018    DEXA SCAN  09/29/2018    Mammogram  08/04/2019    TETANUS VACCINE  06/19/2024    Colonoscopy  09/13/2027    Hepatitis C Screening  Completed    Zoster Vaccine  Completed    Influenza Vaccine  Completed         Assessment:     1. Uncontrolled type 2 diabetes mellitus without complication, without long-term current use of insulin Sub-optimally controlled   2. Hypertension associated with diabetes Well controlled   3. Chest pain, unspecified type Active   4. Palpitations Active   5. Gastroesophageal reflux disease, esophagitis presence not specified Well controlled   6. Hypothyroidism, unspecified type Well controlled   7. BAM on CPAP Well controlled   8. Morbid obesity with BMI of 45.0-49.9, adult Sub-optimally controlled   9. Needs flu shot Active         Plan: Vivienne was seen today for diabetes.    Diagnoses and all orders for this visit:    Uncontrolled type 2 diabetes mellitus without complication, without long-term current use of insulin  Comments:  continue current regimen and encouraged stricter ADA diet which seems to be an issue; repeat HGBA1C in 3 months  Orders:  -     CBC auto differential; Future  -     Comprehensive metabolic panel; Future  -     Hemoglobin A1c; Future    Hypertension associated with diabetes  Comments:  continue current regimen and encouraged low Na diet and weight loss  Orders:  -     CBC auto differential; Future  -     Comprehensive metabolic panel; Future    Chest pain,  unspecified type  Comments:  pt. does not want EKG and states she will f/u cardiology; she is not concerned due to the fact symptoms have been chronic; continue ASA     Palpitations  Comments:  pt.refuses EKG or holter; she states she will f/u cardiology for further management if needed; asked pt. to proceed to ER if condition worsens    Gastroesophageal reflux disease, esophagitis presence not specified  Comments:  continue prilosec prn     Hypothyroidism, unspecified type  Comments:  continue current regimen     BAM on CPAP  Comments:  continue CPAP QHS    Morbid obesity with BMI of 45.0-49.9, adult  Comments:  encouraged diet and explained risks     Needs flu shot  -     Influenza - High Dose (65+) (PF) (IM)        Problem List Items Addressed This Visit        Pulmonary    BAM on CPAP       Cardiac/Vascular    Hypertension associated with diabetes    Relevant Orders    CBC auto differential    Comprehensive metabolic panel    Palpitations       ID    Needs flu shot    Relevant Orders    Influenza - High Dose (65+) (PF) (IM) (Completed)       Endocrine    Type 2 diabetes mellitus, uncontrolled - Primary    Relevant Orders    CBC auto differential    Comprehensive metabolic panel    Hemoglobin A1c    Hypothyroidism    Morbid obesity with BMI of 45.0-49.9, adult       GI    Gastroesophageal reflux disease       Other    Chest pain      Other Visit Diagnoses    None.

## 2017-09-17 DIAGNOSIS — I15.2 HYPERTENSION ASSOCIATED WITH DIABETES: ICD-10-CM

## 2017-09-17 DIAGNOSIS — E11.59 HYPERTENSION ASSOCIATED WITH DIABETES: ICD-10-CM

## 2017-09-17 DIAGNOSIS — B00.9 RECURRENT HSV (HERPES SIMPLEX VIRUS): ICD-10-CM

## 2017-09-17 DIAGNOSIS — E11.9 TYPE 2 DIABETES MELLITUS WITHOUT COMPLICATION: ICD-10-CM

## 2017-09-18 ENCOUNTER — CLINICAL SUPPORT (OUTPATIENT)
Dept: INTERNAL MEDICINE | Facility: CLINIC | Age: 67
End: 2017-09-18
Payer: MEDICARE

## 2017-09-18 DIAGNOSIS — J30.9 ACUTE ALLERGIC RHINITIS, UNSPECIFIED SEASONALITY, UNSPECIFIED TRIGGER: ICD-10-CM

## 2017-09-18 LAB — POCT GLUCOSE: 145 MG/DL (ref 70–110)

## 2017-09-18 PROCEDURE — 99499 UNLISTED E&M SERVICE: CPT | Mod: S$GLB,,, | Performed by: ALLERGY & IMMUNOLOGY

## 2017-09-18 PROCEDURE — 95115 IMMUNOTHERAPY ONE INJECTION: CPT | Mod: S$GLB,,, | Performed by: FAMILY MEDICINE

## 2017-09-18 RX ORDER — PEN NEEDLE, DIABETIC 31 GX5/16"
NEEDLE, DISPOSABLE MISCELLANEOUS
Qty: 200 EACH | Refills: 3 | Status: SHIPPED | OUTPATIENT
Start: 2017-09-18 | End: 2017-11-01 | Stop reason: SDUPTHER

## 2017-09-18 RX ORDER — GLUCOSAM/CHON-MSM1/C/MANG/BOSW 500-416.6
TABLET ORAL
Qty: 200 EACH | Refills: 6 | Status: SHIPPED | OUTPATIENT
Start: 2017-09-18 | End: 2017-12-27 | Stop reason: SDUPTHER

## 2017-09-18 RX ORDER — AMLODIPINE BESYLATE 5 MG/1
TABLET ORAL
Qty: 90 TABLET | Refills: 3 | Status: SHIPPED | OUTPATIENT
Start: 2017-09-18 | End: 2018-08-06 | Stop reason: SDUPTHER

## 2017-09-18 RX ORDER — VALSARTAN 160 MG/1
TABLET ORAL
Qty: 180 TABLET | Refills: 1 | Status: SHIPPED | OUTPATIENT
Start: 2017-09-18 | End: 2018-05-13 | Stop reason: SDUPTHER

## 2017-09-18 RX ORDER — VALACYCLOVIR HYDROCHLORIDE 500 MG/1
TABLET, FILM COATED ORAL
Qty: 90 TABLET | Refills: 1 | Status: SHIPPED | OUTPATIENT
Start: 2017-09-18 | End: 2018-03-28 | Stop reason: SDUPTHER

## 2017-09-18 RX ORDER — CALCIUM CITRATE/VITAMIN D3 200MG-6.25
TABLET ORAL
Qty: 200 STRIP | Refills: 6 | Status: SHIPPED | OUTPATIENT
Start: 2017-09-18 | End: 2017-12-27 | Stop reason: SDUPTHER

## 2017-09-18 NOTE — PROGRESS NOTES
Patient here for allergy injections.  No new meds, no problems with last injections.    0.1mL of Blue 1:100 given SQ in Upper Left Arm.  Tolerated well.    Patient waited 30 minutes in the lobby.  Site assessed, 0 reaction at site.    No complaints voiced.

## 2017-09-19 ENCOUNTER — PATIENT MESSAGE (OUTPATIENT)
Dept: GASTROENTEROLOGY | Facility: CLINIC | Age: 67
End: 2017-09-19

## 2017-09-20 ENCOUNTER — OFFICE VISIT (OUTPATIENT)
Dept: CARDIOLOGY | Facility: CLINIC | Age: 67
End: 2017-09-20
Payer: MEDICARE

## 2017-09-20 VITALS
HEIGHT: 62 IN | DIASTOLIC BLOOD PRESSURE: 70 MMHG | HEART RATE: 76 BPM | SYSTOLIC BLOOD PRESSURE: 117 MMHG | BODY MASS INDEX: 45.58 KG/M2 | WEIGHT: 247.69 LBS | OXYGEN SATURATION: 96 %

## 2017-09-20 DIAGNOSIS — I25.10 CORONARY ARTERY DISEASE INVOLVING NATIVE CORONARY ARTERY OF NATIVE HEART WITHOUT ANGINA PECTORIS: ICD-10-CM

## 2017-09-20 DIAGNOSIS — R00.2 PALPITATIONS: ICD-10-CM

## 2017-09-20 DIAGNOSIS — E11.59 HYPERTENSION ASSOCIATED WITH DIABETES: ICD-10-CM

## 2017-09-20 DIAGNOSIS — G47.33 OSA ON CPAP: ICD-10-CM

## 2017-09-20 DIAGNOSIS — E11.69 DYSLIPIDEMIA ASSOCIATED WITH TYPE 2 DIABETES MELLITUS: ICD-10-CM

## 2017-09-20 DIAGNOSIS — I10 ESSENTIAL HYPERTENSION: ICD-10-CM

## 2017-09-20 DIAGNOSIS — R07.89 OTHER CHEST PAIN: Primary | ICD-10-CM

## 2017-09-20 DIAGNOSIS — I15.2 HYPERTENSION ASSOCIATED WITH DIABETES: ICD-10-CM

## 2017-09-20 DIAGNOSIS — R06.09 DOE (DYSPNEA ON EXERTION): ICD-10-CM

## 2017-09-20 DIAGNOSIS — E78.5 DYSLIPIDEMIA ASSOCIATED WITH TYPE 2 DIABETES MELLITUS: ICD-10-CM

## 2017-09-20 PROCEDURE — 3074F SYST BP LT 130 MM HG: CPT | Mod: S$GLB,,, | Performed by: INTERNAL MEDICINE

## 2017-09-20 PROCEDURE — 99499 UNLISTED E&M SERVICE: CPT | Mod: S$GLB,,, | Performed by: INTERNAL MEDICINE

## 2017-09-20 PROCEDURE — 3078F DIAST BP <80 MM HG: CPT | Mod: S$GLB,,, | Performed by: INTERNAL MEDICINE

## 2017-09-20 PROCEDURE — 99214 OFFICE O/P EST MOD 30 MIN: CPT | Mod: S$GLB,,, | Performed by: INTERNAL MEDICINE

## 2017-09-20 PROCEDURE — 3066F NEPHROPATHY DOC TX: CPT | Mod: S$GLB,,, | Performed by: INTERNAL MEDICINE

## 2017-09-20 PROCEDURE — 3008F BODY MASS INDEX DOCD: CPT | Mod: S$GLB,,, | Performed by: INTERNAL MEDICINE

## 2017-09-20 PROCEDURE — 3045F PR MOST RECENT HEMOGLOBIN A1C LEVEL 7.0-9.0%: CPT | Mod: S$GLB,,, | Performed by: INTERNAL MEDICINE

## 2017-09-20 PROCEDURE — 1159F MED LIST DOCD IN RCRD: CPT | Mod: S$GLB,,, | Performed by: INTERNAL MEDICINE

## 2017-09-20 PROCEDURE — 99999 PR PBB SHADOW E&M-EST. PATIENT-LVL III: CPT | Mod: PBBFAC,,, | Performed by: INTERNAL MEDICINE

## 2017-09-20 PROCEDURE — 1125F AMNT PAIN NOTED PAIN PRSNT: CPT | Mod: S$GLB,,, | Performed by: INTERNAL MEDICINE

## 2017-09-20 NOTE — PROGRESS NOTES
"Subjective:   Patient ID:  Vivienne Jose is a 67 y.o. female who presents for follow-up of No chief complaint on file.      Problem List Items Addressed This Visit        Pulmonary    BAM on CPAP       Cardiac/Vascular    HTN (hypertension)    CAD (coronary artery disease)    Dyslipidemia associated with type 2 diabetes mellitus    Hypertension associated with diabetes    Palpitations       Other    Chest pain - Primary      Other Visit Diagnoses    None.         HPI: Patient doing well on f/u. No acute concerns. She visit with PCP which was concerned about her complaints of chest pain. Pain is located mid sternally and worsened with moderate activities. Pain is pressure in quality and moderate in intensity and radiating to neck. Stress test done in 2015 is negative for ischemia. LHC in 2007 showed minimal disease with LAD 20% and RCA 30%.  She continue to have palpitation that is infrequent and few. She has never monitored fit bit for HR during those occasions.   She does use CPAP nightly. No orthopnea or PND. She was unable to lose any weight.   She can walk 2 blocks. No recent MI or CHF.     Review of Systems   Constitution: Negative.   HENT: Negative.    Eyes: Negative.    Cardiovascular: Negative.    Respiratory: Negative.    Endocrine: Negative.    Hematologic/Lymphatic: Negative.    Skin: Negative.    Musculoskeletal: Negative.    Gastrointestinal: Negative.    Neurological: Negative.      Patient's Medications   New Prescriptions    No medications on file   Previous Medications    AMLODIPINE (NORVASC) 5 MG TABLET    TAKE 1 TABLET EVERY DAY    ASPIRIN (ECOTRIN) 81 MG EC TABLET    Take 81 mg by mouth once daily.    AZELASTINE (ASTELIN) 137 MCG (0.1 %) NASAL SPRAY    1 spray (137 mcg total) by Nasal route daily as needed for Rhinitis.    BD ULTRA-FINE EDUARDO PEN NEEDLES 32 GAUGE X 5/32" NDLE    USE WITH VICTOZA AND LEVEMIR (2 INJECTIONS EVERY DAY)    BIOTIN 10,000 MCG CAP    Take by mouth.    BLOOD-GLUCOSE METER " MISC    [employer] True Metrix Air meter    CALCIUM CARBONATE-VITAMIN D3 600 MG(1,500MG) -100 UNIT CAP    Take 1 tablet by mouth once daily.    CONJUGATED ESTROGENS (PREMARIN) VAGINAL CREAM    Place 0.5 g vaginally 3 (three) times a week.    DICLOFENAC SODIUM (VOLTAREN) 1 % GEL    Apply 2 g topically 4 (four) times daily.    ECONAZOLE NITRATE 1 % CREAM    Apply topically once daily.    FLUTICASONE (FLONASE) 50 MCG/ACTUATION NASAL SPRAY    2 sprays by Each Nare route once daily.    GABAPENTIN (NEURONTIN) 300 MG CAPSULE    Take 1 capsule (300 mg total) by mouth every evening.    GLIMEPIRIDE (AMARYL) 4 MG TABLET    Take 1 tablet (4 mg total) by mouth daily with breakfast.    HYDROCHLOROTHIAZIDE (HYDRODIURIL) 25 MG TABLET    Take 1 tablet (25 mg total) by mouth once daily.    L.ACID-B.BIFIDUM-B.ANIMAL-FOS (PROBIOTIC COMPLEX) 25 BILLION CELL -100 MG CAP    Take 1 capsule by mouth once daily.    LEVEMIR FLEXTOUCH 100 UNIT/ML (3 ML) INPN PEN    Inject 14 Units into the skin once daily.    LEVOTHYROXINE (SYNTHROID) 75 MCG TABLET    Take 1 tablet (75 mcg total) by mouth once daily.    LIRAGLUTIDE 0.6 MG/0.1 ML, 18 MG/3 ML, SUBQ PNIJ (VICTOZA 2-TAWANA) 0.6 MG/0.1 ML (18 MG/3 ML) PNIJ    Inject 1.2 mg into the skin once daily.    LORATADINE (CLARITIN) 10 MG TABLET    Take 10 mg by mouth once daily.    LOVASTATIN (MEVACOR) 40 MG TABLET    TAKE 1 TABLET  NIGHTLY    MAGNESIUM OXIDE-MG AA CHELATE (MAGNESIUM, AMINO ACID CHELATE,) 133 MG TAB    Take by mouth as directed.     METFORMIN (GLUCOPHAGE) 1000 MG TABLET    TAKE 1 TABLET TWICE DAILY WITH MEALS    TRUE METRIX GLUCOSE TEST STRIP STRP    TEST TWO TIMES DAILY    TRUEPLUS LANCETS 28 GAUGE MISC    TEST TWO TIMES DAILY    UNABLE TO FIND    Zatador eye drops for allergies    VALACYCLOVIR (VALTREX) 500 MG TABLET    TAKE 1 TABLET EVERY DAY    VALSARTAN (DIOVAN) 160 MG TABLET    TAKE 1 TABLET TWICE DAILY   Modified Medications    No medications on file   Discontinued Medications    No  medications on file       Objective:   Physical Exam   Constitutional: She is oriented to person, place, and time. She appears well-developed and well-nourished. No distress.   Examination of the digits showed no clubbing or cyanosis   HENT:   Head: Normocephalic and atraumatic.   Eyes: Conjunctivae are normal. Pupils are equal, round, and reactive to light. Right eye exhibits no discharge.   Neck: Normal range of motion. Neck supple. No JVD present. No thyromegaly present.   No carotid bruits   Cardiovascular: Normal rate, regular rhythm, S1 normal, S2 normal, normal heart sounds, intact distal pulses and normal pulses.  PMI is not displaced.  Exam reveals no gallop, no friction rub and no opening snap.    No murmur heard.  Pulmonary/Chest: Effort normal and breath sounds normal. No respiratory distress. She has no wheezes. She has no rales. She exhibits no tenderness.   Abdominal: Soft. Bowel sounds are normal. She exhibits no distension and no mass. There is no tenderness. There is no guarding.   No hepatosplenomegaly   Musculoskeletal: Normal range of motion. She exhibits no edema or tenderness.   Trace edema   Lymphadenopathy:     She has no cervical adenopathy.   Neurological: She is alert and oriented to person, place, and time.   Skin: Skin is warm. No rash noted. She is not diaphoretic. No erythema.   Psychiatric: She has a normal mood and affect.   Nursing note and vitals reviewed.      ECGs reviewed-NSR with RBBB  LABS reviewed  Imaging including Echoes reviewed-60% with no DD    Assessment:     1. Other chest pain    2. Essential hypertension    3. Coronary artery disease involving native coronary artery of native heart without angina pectoris    4. Dyslipidemia associated with type 2 diabetes mellitus    5. Hypertension associated with diabetes    6. Palpitations    7. BAM on CPAP        Plan:     Patient continues to have chest pain concerning for ischemia but recent Nuclear stress negative. Will try  PET stress.   Continue current medications  Low salt diet  Weight loss. Dietary changes discussed with patient, as explained to patient with normal echo and nuclear stress dyspnea is likely secondary to weight.   Increase activity as tolerated  F/u in 3 months.

## 2017-09-20 NOTE — LETTER
September 20, 2017        Jay Jay Felton MD  [address] [facility]  [place] [address]             [facility]  [address] [place] [address], [address]  [place] [address]  Phone: [phone]   Patient: Vivienne Jose   MR Number: [id number]   YOB: 1950   Date of Visit: 9/20/2017       Dear Dr. Felton:    Thank you for referring Vivienne Jose to me for evaluation. Below are the relevant portions of my assessment and plan of care.     ECGs reviewed-NSR with RBBB  LABS reviewed  Imaging including Echoes reviewed-60% with no DD     Assessment:      1. Other chest pain    2. Essential hypertension    3. Coronary artery disease involving native coronary artery of native heart without angina pectoris    4. Dyslipidemia associated with type 2 diabetes mellitus    5. Hypertension associated with diabetes    6. Palpitations    7. BAM on CPAP          Plan:      Patient continues to have chest pain concerning for ischemia but recent Nuclear stress negative. Will try PET stress.   Continue current medications  Low salt diet  Weight loss. Dietary changes discussed with patient, as explained to patient with normal echo and nuclear stress dyspnea is likely secondary to weight.   Increase activity as tolerated  F/u in 3 months.                 If you have questions, please do not hesitate to call me. I look forward to following Vivienne along with you.    Sincerely,      Maya Diez MD           CC  No Recipients

## 2017-09-21 ENCOUNTER — PATIENT OUTREACH (OUTPATIENT)
Dept: OTHER | Facility: OTHER | Age: 67
End: 2017-09-21

## 2017-09-21 NOTE — PROGRESS NOTES
Last 5 Patient Entered Redings Current 30 Day Average: 144/71     Recent Readings 9/19/2017 9/18/2017 9/17/2017 9/16/2017 9/15/2017    Systolic BP (mmHg) 143 148 139 139 147    Diastolic BP (mmHg) 66 75 77 68 66    Pulse 69 74 70 63 65        Ms. Jose's BP average is above goal. She checks most of her readings in the evenings which is also when she takes her amlodipine and second dose of valsartan. Asked that she try to take more readings at different times of the day to have a better idea of how her BP is responding to her medications. She said she will try to take it with her to work to check her BP at different times of the day. She is having a stress test done on 10/5. Will follow up with her in about 1 month.     Current HTN regimen:  Hypertension Medications             amlodipine (NORVASC) 5 MG tablet TAKE 1 TABLET EVERY DAY    hydrochlorothiazide (HYDRODIURIL) 25 MG tablet Take 1 tablet (25 mg total) by mouth once daily.    valsartan (DIOVAN) 160 MG tablet TAKE 1 TABLET TWICE DAILY          Will continue to monitor regularly. Will follow up in 1-2 months, sooner if BP begins to trend upward or downward.    Patient has my contact information and knows to call with any concerns or clinical changes.

## 2017-09-25 ENCOUNTER — CLINICAL SUPPORT (OUTPATIENT)
Dept: INTERNAL MEDICINE | Facility: CLINIC | Age: 67
End: 2017-09-25
Payer: MEDICARE

## 2017-09-25 DIAGNOSIS — J30.9 ACUTE ALLERGIC RHINITIS, UNSPECIFIED SEASONALITY, UNSPECIFIED TRIGGER: ICD-10-CM

## 2017-09-25 PROCEDURE — 99499 UNLISTED E&M SERVICE: CPT | Mod: S$GLB,,, | Performed by: ALLERGY & IMMUNOLOGY

## 2017-09-25 PROCEDURE — 95115 IMMUNOTHERAPY ONE INJECTION: CPT | Mod: S$GLB,,, | Performed by: FAMILY MEDICINE

## 2017-09-25 NOTE — PROGRESS NOTES
Patient here for allergy injections.  No new meds, no problems with last injections.    0.2mL of Blue 1:100 given SQ in Upper Left Arm.  Tolerated well.    Patient waited 30 minutes in the lobby.  Site assessed, 0 reaction at site.    No complaints voiced.

## 2017-09-29 ENCOUNTER — PATIENT OUTREACH (OUTPATIENT)
Dept: OTHER | Facility: OTHER | Age: 67
End: 2017-09-29

## 2017-09-29 ENCOUNTER — CLINICAL SUPPORT (OUTPATIENT)
Dept: INTERNAL MEDICINE | Facility: CLINIC | Age: 67
End: 2017-09-29
Payer: MEDICARE

## 2017-09-29 DIAGNOSIS — J30.9 ACUTE ALLERGIC RHINITIS, UNSPECIFIED SEASONALITY, UNSPECIFIED TRIGGER: Primary | ICD-10-CM

## 2017-09-29 NOTE — PROGRESS NOTES
Last 5 Patient Entered Redings Current 30 Day Average: 142/71     Recent Readings 9/29/2017 9/28/2017 9/27/2017 9/26/2017 9/25/2017    Systolic BP (mmHg) 139 133 131 147 145    Diastolic BP (mmHg) 73 69 72 72 62    Pulse 64 66 63 71 68        Hypertension Digital Medicine Program (HDMP): Health  Follow Up    Lifestyle Modifications:    1.Low sodium diet: no Patient denies any changes to her diet.    2.Physical activity: no Mrs. Jose denies any changes to physical activity.    3.Hypotension/Hypertension symptoms: no   Frequency/Alleviating factors/Precipitating factors, etc.     4.Patient has been compliant with the medication regimen.     Follow up with Ms. Vivienne Jose completed. Mrs. Jose is doing well. Patient reports that she has started checking her BP at different times to the day. No further questions or concerns. I will follow up in a few weeks to assess progress.

## 2017-09-29 NOTE — PROGRESS NOTES
No peakflow required.  Gave injection vial one of one (c/dm/tr)- 0.3ml of vial 1:100 (blue) Left upper arm.     No notable reaction, pt tolerated well.

## 2017-10-04 ENCOUNTER — CLINICAL SUPPORT (OUTPATIENT)
Dept: INTERNAL MEDICINE | Facility: CLINIC | Age: 67
End: 2017-10-04
Payer: MEDICARE

## 2017-10-04 DIAGNOSIS — J30.9 ACUTE ALLERGIC RHINITIS, UNSPECIFIED SEASONALITY, UNSPECIFIED TRIGGER: Primary | ICD-10-CM

## 2017-10-04 NOTE — PROGRESS NOTES
No peak flow required, gave injection as follows:     Vial one of one (c/dm/tr) vial 1: 100 (blue) 0.4ml sub q left upper arm.     Pt tolerated well, no notable reaction.

## 2017-10-05 ENCOUNTER — OFFICE VISIT (OUTPATIENT)
Dept: URGENT CARE | Facility: CLINIC | Age: 67
End: 2017-10-05
Payer: MEDICARE

## 2017-10-05 VITALS
WEIGHT: 247 LBS | SYSTOLIC BLOOD PRESSURE: 136 MMHG | RESPIRATION RATE: 14 BRPM | BODY MASS INDEX: 45.45 KG/M2 | DIASTOLIC BLOOD PRESSURE: 75 MMHG | HEART RATE: 68 BPM | HEIGHT: 62 IN | TEMPERATURE: 101 F | OXYGEN SATURATION: 97 %

## 2017-10-05 DIAGNOSIS — R50.9 FEVER, UNSPECIFIED FEVER CAUSE: Primary | ICD-10-CM

## 2017-10-05 DIAGNOSIS — J06.9 URTI (ACUTE UPPER RESPIRATORY INFECTION): ICD-10-CM

## 2017-10-05 LAB
CTP QC/QA: YES
FLUAV AG NPH QL: NEGATIVE
FLUBV AG NPH QL: NEGATIVE

## 2017-10-05 PROCEDURE — 99214 OFFICE O/P EST MOD 30 MIN: CPT | Mod: S$GLB,,, | Performed by: FAMILY MEDICINE

## 2017-10-05 PROCEDURE — 87804 INFLUENZA ASSAY W/OPTIC: CPT | Mod: QW,S$GLB,, | Performed by: FAMILY MEDICINE

## 2017-10-05 NOTE — PROGRESS NOTES
"Subjective:       Patient ID: Vivienne Jose is a 67 y.o. female.    Vitals:  height is 5' 2" (1.575 m) and weight is 112 kg (247 lb). Her oral temperature is 100.7 °F (38.2 °C) (abnormal). Her blood pressure is 136/75 and her pulse is 68. Her respiration is 14 and oxygen saturation is 97%.     Chief Complaint: Sinus Problem    Sinus Problem   This is a new problem. The current episode started in the past 7 days. The problem has been gradually worsening since onset. The maximum temperature recorded prior to her arrival was 100.4 - 100.9 F. The fever has been present for 1 to 2 days. She is experiencing no pain. Associated symptoms include chills, congestion, headaches and sinus pressure. Pertinent negatives include no coughing, ear pain, hoarse voice, shortness of breath or sore throat.     Review of Systems   Constitution: Positive for chills, fever, weakness and malaise/fatigue.   HENT: Positive for congestion and sinus pressure. Negative for ear pain, hoarse voice and sore throat.    Eyes: Negative for discharge and redness.   Cardiovascular: Negative for chest pain, dyspnea on exertion and leg swelling.   Respiratory: Negative for cough, shortness of breath, sputum production and wheezing.    Musculoskeletal: Positive for myalgias.   Gastrointestinal: Negative for abdominal pain and nausea.   Neurological: Positive for headaches.       Objective:      Physical Exam   Constitutional: She is oriented to person, place, and time. She appears well-developed and well-nourished. She is cooperative.  Non-toxic appearance. She does not appear ill. No distress.   Looks slightly sick and tired.   HENT:   Head: Normocephalic and atraumatic.   Right Ear: Hearing, tympanic membrane, external ear and ear canal normal.   Left Ear: Hearing, tympanic membrane, external ear and ear canal normal.   Nose: Mucosal edema present. No rhinorrhea or nasal deformity. No epistaxis. Right sinus exhibits no maxillary sinus tenderness and no " frontal sinus tenderness. Left sinus exhibits no maxillary sinus tenderness and no frontal sinus tenderness.   Mouth/Throat: Uvula is midline, oropharynx is clear and moist and mucous membranes are normal. No trismus in the jaw. Normal dentition. No uvula swelling. No posterior oropharyngeal erythema.   Eyes: Conjunctivae and lids are normal. No scleral icterus.   Sclera clear bilat   Neck: Trachea normal, full passive range of motion without pain and phonation normal. Neck supple.   Cardiovascular: Normal rate, regular rhythm, normal heart sounds, intact distal pulses and normal pulses.    Pulmonary/Chest: Effort normal and breath sounds normal. No respiratory distress.   Abdominal: Soft. Normal appearance and bowel sounds are normal. She exhibits no distension. There is no tenderness.   Musculoskeletal: Normal range of motion. She exhibits no edema or deformity.   Neurological: She is alert and oriented to person, place, and time. She exhibits normal muscle tone. Coordination normal.   Skin: Skin is warm, dry and intact. She is not diaphoretic. No pallor.   Psychiatric: She has a normal mood and affect. Her speech is normal and behavior is normal. Judgment and thought content normal. Cognition and memory are normal.   Nursing note and vitals reviewed.      Assessment:       1. Fever, unspecified fever cause    2. URTI (acute upper respiratory infection)        Plan:         Fever, unspecified fever cause  -     POCT Influenza A/B    URTI (acute upper respiratory infection)        Rest. Fluids.Tylenol.  Follow up with PCP not any better as discussed.   To [facility] for any worsening of symptoms.  Patient  voiced understanding and agreement.

## 2017-10-08 DIAGNOSIS — E11.9 TYPE 2 DIABETES MELLITUS WITHOUT COMPLICATION: ICD-10-CM

## 2017-10-09 ENCOUNTER — CLINICAL SUPPORT (OUTPATIENT)
Dept: INTERNAL MEDICINE | Facility: CLINIC | Age: 67
End: 2017-10-09
Payer: MEDICARE

## 2017-10-09 DIAGNOSIS — J30.9 ACUTE ALLERGIC RHINITIS, UNSPECIFIED SEASONALITY, UNSPECIFIED TRIGGER: ICD-10-CM

## 2017-10-09 PROCEDURE — 95115 IMMUNOTHERAPY ONE INJECTION: CPT | Mod: S$GLB,,, | Performed by: FAMILY MEDICINE

## 2017-10-09 PROCEDURE — 99499 UNLISTED E&M SERVICE: CPT | Mod: S$GLB,,, | Performed by: ALLERGY & IMMUNOLOGY

## 2017-10-09 RX ORDER — METFORMIN HYDROCHLORIDE 1000 MG/1
TABLET ORAL
Qty: 180 TABLET | Refills: 3 | Status: SHIPPED | OUTPATIENT
Start: 2017-10-09 | End: 2017-12-27 | Stop reason: SDUPTHER

## 2017-10-09 NOTE — PROGRESS NOTES
Patient here for allergy injection.    0.5mL given SQ in Upper Left arm.  Tolerated well.    Patient waited in Lobby for 30 minutes.  No reaction noted.    No complaints were voiced.

## 2017-10-13 ENCOUNTER — CLINICAL SUPPORT (OUTPATIENT)
Dept: INTERNAL MEDICINE | Facility: CLINIC | Age: 67
End: 2017-10-13
Payer: MEDICARE

## 2017-10-13 DIAGNOSIS — J30.9 CHRONIC ALLERGIC RHINITIS, UNSPECIFIED SEASONALITY, UNSPECIFIED TRIGGER: ICD-10-CM

## 2017-10-13 PROCEDURE — 99499 UNLISTED E&M SERVICE: CPT | Mod: S$GLB,,, | Performed by: ALLERGY & IMMUNOLOGY

## 2017-10-13 PROCEDURE — 95115 IMMUNOTHERAPY ONE INJECTION: CPT | Mod: S$GLB,,, | Performed by: FAMILY MEDICINE

## 2017-10-16 ENCOUNTER — CLINICAL SUPPORT (OUTPATIENT)
Dept: INTERNAL MEDICINE | Facility: CLINIC | Age: 67
End: 2017-10-16
Payer: MEDICARE

## 2017-10-16 DIAGNOSIS — J30.9 CHRONIC ALLERGIC RHINITIS, UNSPECIFIED SEASONALITY, UNSPECIFIED TRIGGER: ICD-10-CM

## 2017-10-16 PROCEDURE — 95115 IMMUNOTHERAPY ONE INJECTION: CPT | Mod: S$GLB,,, | Performed by: FAMILY MEDICINE

## 2017-10-16 PROCEDURE — 99499 UNLISTED E&M SERVICE: CPT | Mod: S$GLB,,, | Performed by: ALLERGY & IMMUNOLOGY

## 2017-10-16 NOTE — PROGRESS NOTES
Patient here for allergy injection.    0.15 mL given SQ in Upper Left arm.  Tolerated well.    No complaints were voiced.    Patient observed for 30 minutes. No reaction noted.

## 2017-10-18 ENCOUNTER — PATIENT OUTREACH (OUTPATIENT)
Dept: OTHER | Facility: OTHER | Age: 67
End: 2017-10-18

## 2017-10-18 NOTE — PROGRESS NOTES
Last 5 Patient Entered Redings Current 30 Day Average: 139/71     Recent Readings 10/17/2017 10/17/2017 10/16/2017 10/15/2017 10/14/2017    Systolic BP (mmHg) 146 145 140 139 134    Diastolic BP (mmHg) 70 74 71 72 67    Pulse 90 91 74 68 69        Ms. Jose's BP average is trending down, but she says she isn't sure why. She has not made any changes to diet or increased her physical activity. Asked that she try to reduce salt in her diet and work in some physical activity when she can to help improve her BP readings. She understands the next step to getting her BP better controlled is to add a medication. She is on maximum dose of valsartan, and she has BRYANNA with amlodipine 10 mg QD. She does not want to add any medications, so strongly encouraged she work on reducing salt intake and getting more exercise in.     Current HTN regimen:  Hypertension Medications             amlodipine (NORVASC) 5 MG tablet TAKE 1 TABLET EVERY DAY    hydrochlorothiazide (HYDRODIURIL) 25 MG tablet Take 1 tablet (25 mg total) by mouth once daily.    valsartan (DIOVAN) 160 MG tablet TAKE 1 TABLET TWICE DAILY        Will continue to monitor regularly. Will follow up in 1-2 months, sooner if BP begins to trend upward or downward.    Patient has my contact information and knows to call with any concerns or clinical changes.

## 2017-10-24 ENCOUNTER — TELEPHONE (OUTPATIENT)
Dept: NEPHROLOGY | Facility: CLINIC | Age: 67
End: 2017-10-24

## 2017-10-27 ENCOUNTER — CLINICAL SUPPORT (OUTPATIENT)
Dept: INTERNAL MEDICINE | Facility: CLINIC | Age: 67
End: 2017-10-27
Payer: MEDICARE

## 2017-10-27 ENCOUNTER — PATIENT OUTREACH (OUTPATIENT)
Dept: OTHER | Facility: OTHER | Age: 67
End: 2017-10-27

## 2017-10-27 DIAGNOSIS — J30.9 ACUTE ALLERGIC RHINITIS, UNSPECIFIED SEASONALITY, UNSPECIFIED TRIGGER: ICD-10-CM

## 2017-10-27 DIAGNOSIS — J30.9 CHRONIC ALLERGIC RHINITIS, UNSPECIFIED SEASONALITY, UNSPECIFIED TRIGGER: ICD-10-CM

## 2017-10-27 PROCEDURE — 95115 IMMUNOTHERAPY ONE INJECTION: CPT | Mod: S$GLB,,, | Performed by: FAMILY MEDICINE

## 2017-10-27 PROCEDURE — 99499 UNLISTED E&M SERVICE: CPT | Mod: S$GLB,,, | Performed by: ALLERGY & IMMUNOLOGY

## 2017-10-27 NOTE — PROGRESS NOTES
Patient here for allergy injection.    0.2 mL given SQ in Upper Left arm.  Tolerated well.    No complaints were voiced.    Patient observed for 30 minutes. No reaction noted.

## 2017-10-27 NOTE — PROGRESS NOTES
"Last 5 Patient Entered Readings Current 30 Day Average: 139/71     Recent Readings 10/27/2017 10/27/2017 10/24/2017 10/23/2017 10/22/2017    Systolic BP (mmHg) 152 151 149 150 149    Diastolic BP (mmHg) 68 69 76 72 71    Pulse 82 84 73 69 77        Hypertension Digital Medicine Program (HDMP): Health  Follow Up    Lifestyle Modifications:    1.Low sodium diet: no Mrs. Jose continues to eat out twice daily. Patient states that she knows that she needs to change and has "no excuse other than pure laziness". She also states that her part time job is more like a full time job. Her  also enjoys dining out and "throws a fit" when she talks about eating in.     2.Physical activity: no Patient denies any changes to physical activity. She is using her Fitbit to track her steps, but believes it may be inaccurate. Ms. Jose also has a membership to [employer]. Her goals for 2018 are to prepare her meals at home, increase physical activity, and lose weight. She does not want to start additional BP medication.    3.Hypotension/Hypertension symptoms: no   Frequency/Alleviating factors/Precipitating factors, etc.     4.Patient has been compliant with the medication regimen.     Follow up with Ms. Vivienne Jose completed. No further questions or concerns. I will follow up in a few weeks to assess progress.           "

## 2017-10-29 DIAGNOSIS — E78.5 HYPERLIPIDEMIA: ICD-10-CM

## 2017-10-30 ENCOUNTER — CLINICAL SUPPORT (OUTPATIENT)
Dept: INTERNAL MEDICINE | Facility: CLINIC | Age: 67
End: 2017-10-30
Payer: MEDICARE

## 2017-10-30 DIAGNOSIS — J30.9 ACUTE ALLERGIC RHINITIS, UNSPECIFIED SEASONALITY, UNSPECIFIED TRIGGER: ICD-10-CM

## 2017-10-30 DIAGNOSIS — J30.9 CHRONIC ALLERGIC RHINITIS, UNSPECIFIED SEASONALITY, UNSPECIFIED TRIGGER: ICD-10-CM

## 2017-10-30 PROCEDURE — 95115 IMMUNOTHERAPY ONE INJECTION: CPT | Mod: S$GLB,,, | Performed by: INTERNAL MEDICINE

## 2017-10-30 PROCEDURE — 99499 UNLISTED E&M SERVICE: CPT | Mod: S$GLB,,, | Performed by: ALLERGY & IMMUNOLOGY

## 2017-10-30 RX ORDER — LOVASTATIN 40 MG/1
TABLET ORAL
Qty: 90 TABLET | Refills: 0 | Status: SHIPPED | OUTPATIENT
Start: 2017-10-30 | End: 2018-01-19 | Stop reason: SDUPTHER

## 2017-10-30 NOTE — PROGRESS NOTES
Patient here for allergy injection.    0.25 mL given SQ of 1:10 Yellow  in Upper Left arm.  Tolerated well.    Patient observed for 30 minutes. No reaction noted.  No complaints voiced.

## 2017-11-01 ENCOUNTER — CLINICAL SUPPORT (OUTPATIENT)
Dept: CARDIOLOGY | Facility: CLINIC | Age: 67
End: 2017-11-01
Payer: MEDICARE

## 2017-11-01 DIAGNOSIS — R07.89 OTHER CHEST PAIN: ICD-10-CM

## 2017-11-01 DIAGNOSIS — R06.09 DOE (DYSPNEA ON EXERTION): ICD-10-CM

## 2017-11-01 DIAGNOSIS — I25.10 CORONARY ARTERY DISEASE INVOLVING NATIVE CORONARY ARTERY OF NATIVE HEART WITHOUT ANGINA PECTORIS: ICD-10-CM

## 2017-11-01 LAB — DIASTOLIC DYSFUNCTION: NO

## 2017-11-01 PROCEDURE — 93015 CV STRESS TEST SUPVJ I&R: CPT | Mod: S$GLB,,, | Performed by: INTERNAL MEDICINE

## 2017-11-01 PROCEDURE — A9555 RB82 RUBIDIUM: HCPCS | Mod: S$GLB,,, | Performed by: INTERNAL MEDICINE

## 2017-11-01 PROCEDURE — 78492 MYOCRD IMG PET MLT RST&STRS: CPT | Mod: S$GLB,,, | Performed by: INTERNAL MEDICINE

## 2017-11-01 RX ORDER — PEN NEEDLE, DIABETIC 31 GX5/16"
NEEDLE, DISPOSABLE MISCELLANEOUS
Qty: 100 EACH | Refills: 3 | Status: SHIPPED | OUTPATIENT
Start: 2017-11-01 | End: 2017-12-27 | Stop reason: SDUPTHER

## 2017-11-02 NOTE — TELEPHONE ENCOUNTER
Per Pt she takes LEVEMIR FLEXTOUCH 100 unit/mL (3 mL) InPn pen 14 UNITS SUBCUTANEOUSLY EVERY EVENING

## 2017-11-03 ENCOUNTER — CLINICAL SUPPORT (OUTPATIENT)
Dept: INTERNAL MEDICINE | Facility: CLINIC | Age: 67
End: 2017-11-03
Payer: MEDICARE

## 2017-11-03 DIAGNOSIS — J30.9 ACUTE ALLERGIC RHINITIS, UNSPECIFIED SEASONALITY, UNSPECIFIED TRIGGER: ICD-10-CM

## 2017-11-03 PROCEDURE — 95115 IMMUNOTHERAPY ONE INJECTION: CPT | Mod: S$GLB,,, | Performed by: INTERNAL MEDICINE

## 2017-11-03 PROCEDURE — 99499 UNLISTED E&M SERVICE: CPT | Mod: S$GLB,,, | Performed by: ALLERGY & IMMUNOLOGY

## 2017-11-03 RX ORDER — INSULIN DETEMIR 100 [IU]/ML
INJECTION, SOLUTION SUBCUTANEOUS
Qty: 15 ML | Refills: 1 | Status: SHIPPED | OUTPATIENT
Start: 2017-11-03 | End: 2017-12-27 | Stop reason: SDUPTHER

## 2017-11-03 NOTE — PROGRESS NOTES
Patient here for allergy injection.    0.3 mL given SQ of 1:10 Yellow  in Upper Left arm.  Tolerated well. Finger scanner not working for patient, had to enter as Non-XIS injection.    Patient observed for 30 minutes. No reaction noted.  No complaints voiced.

## 2017-11-05 DIAGNOSIS — E03.9 HYPOTHYROIDISM, UNSPECIFIED TYPE: ICD-10-CM

## 2017-11-06 ENCOUNTER — CLINICAL SUPPORT (OUTPATIENT)
Dept: INTERNAL MEDICINE | Facility: CLINIC | Age: 67
End: 2017-11-06
Payer: MEDICARE

## 2017-11-06 DIAGNOSIS — J30.1 CHRONIC ALLERGIC RHINITIS DUE TO POLLEN, UNSPECIFIED SEASONALITY: ICD-10-CM

## 2017-11-06 DIAGNOSIS — J30.9 ACUTE ALLERGIC RHINITIS, UNSPECIFIED SEASONALITY, UNSPECIFIED TRIGGER: ICD-10-CM

## 2017-11-06 PROCEDURE — 95115 IMMUNOTHERAPY ONE INJECTION: CPT | Mod: S$GLB,,, | Performed by: FAMILY MEDICINE

## 2017-11-06 PROCEDURE — 99499 UNLISTED E&M SERVICE: CPT | Mod: S$GLB,,, | Performed by: ALLERGY & IMMUNOLOGY

## 2017-11-06 RX ORDER — LEVOTHYROXINE SODIUM 75 UG/1
TABLET ORAL
Qty: 90 TABLET | Refills: 1 | Status: SHIPPED | OUTPATIENT
Start: 2017-11-06 | End: 2017-12-27 | Stop reason: SDUPTHER

## 2017-11-06 NOTE — PROGRESS NOTES
Patient here for allergy injection.    0.35 mL given SQ of 1:10 Yellow  in Upper Left arm.  Tolerated well. Finger scanner not working for patient, had to enter with PIN  Patient observed for 30 minutes. 1+ reaction with erythema and itching.  No other complaints voiced.

## 2017-11-07 NOTE — PROGRESS NOTES
Allergy Mix, , 52 doses    Patient is currently on allergen specific immunotherapy with significant benefits; this patient desires to continue injections at this time.  Vaccine formula request for remixing vaccine was entered into XPS today for the mixing of 52 doses by the [facility].

## 2017-11-10 ENCOUNTER — CLINICAL SUPPORT (OUTPATIENT)
Dept: INTERNAL MEDICINE | Facility: CLINIC | Age: 67
End: 2017-11-10
Payer: MEDICARE

## 2017-11-10 DIAGNOSIS — J30.9 CHRONIC ALLERGIC RHINITIS, UNSPECIFIED SEASONALITY, UNSPECIFIED TRIGGER: ICD-10-CM

## 2017-11-10 PROCEDURE — 95115 IMMUNOTHERAPY ONE INJECTION: CPT | Mod: S$GLB,,, | Performed by: FAMILY MEDICINE

## 2017-11-10 PROCEDURE — 99499 UNLISTED E&M SERVICE: CPT | Mod: S$GLB,,, | Performed by: ALLERGY & IMMUNOLOGY

## 2017-11-10 NOTE — PROGRESS NOTES
Patient here for allergy injection.    0.4 mL given SQ of 1:10 Yellow  in Upper Left arm.  Tolerated well. Finger scanner not working for patient, had to enter with PIN  Patient observed for 30 minutes. 0 reaction.    No complaints voiced.

## 2017-11-13 ENCOUNTER — CLINICAL SUPPORT (OUTPATIENT)
Dept: INTERNAL MEDICINE | Facility: CLINIC | Age: 67
End: 2017-11-13
Payer: MEDICARE

## 2017-11-13 DIAGNOSIS — J30.9 CHRONIC ALLERGIC RHINITIS, UNSPECIFIED SEASONALITY, UNSPECIFIED TRIGGER: ICD-10-CM

## 2017-11-13 PROCEDURE — 95115 IMMUNOTHERAPY ONE INJECTION: CPT | Mod: S$GLB,,, | Performed by: FAMILY MEDICINE

## 2017-11-13 PROCEDURE — 99499 UNLISTED E&M SERVICE: CPT | Mod: S$GLB,,, | Performed by: ALLERGY & IMMUNOLOGY

## 2017-11-13 NOTE — PROGRESS NOTES
Patient here for allergy injection.    0.45 mL given SQ of 1:10 Yellow  in Upper Left arm.  Tolerated well.   Patient observed for 30 minutes. 0 reaction.    No complaints voiced.

## 2017-11-17 ENCOUNTER — OFFICE VISIT (OUTPATIENT)
Dept: PODIATRY | Facility: CLINIC | Age: 67
End: 2017-11-17
Payer: MEDICARE

## 2017-11-17 ENCOUNTER — CLINICAL SUPPORT (OUTPATIENT)
Dept: INTERNAL MEDICINE | Facility: CLINIC | Age: 67
End: 2017-11-17
Payer: MEDICARE

## 2017-11-17 VITALS
DIASTOLIC BLOOD PRESSURE: 64 MMHG | HEIGHT: 62 IN | BODY MASS INDEX: 45.45 KG/M2 | WEIGHT: 247 LBS | SYSTOLIC BLOOD PRESSURE: 140 MMHG | HEART RATE: 75 BPM

## 2017-11-17 DIAGNOSIS — J30.9 CHRONIC ALLERGIC RHINITIS, UNSPECIFIED SEASONALITY, UNSPECIFIED TRIGGER: ICD-10-CM

## 2017-11-17 DIAGNOSIS — E11.9 CONTROLLED TYPE 2 DIABETES MELLITUS WITHOUT COMPLICATION, WITH LONG-TERM CURRENT USE OF INSULIN: Primary | ICD-10-CM

## 2017-11-17 DIAGNOSIS — L60.0 ONYCHOCRYPTOSIS: ICD-10-CM

## 2017-11-17 DIAGNOSIS — L60.8 PINCER NAIL DEFORMITY: ICD-10-CM

## 2017-11-17 DIAGNOSIS — Z79.4 CONTROLLED TYPE 2 DIABETES MELLITUS WITHOUT COMPLICATION, WITH LONG-TERM CURRENT USE OF INSULIN: Primary | ICD-10-CM

## 2017-11-17 PROCEDURE — 99213 OFFICE O/P EST LOW 20 MIN: CPT | Mod: S$GLB,,, | Performed by: PODIATRIST

## 2017-11-17 PROCEDURE — 95115 IMMUNOTHERAPY ONE INJECTION: CPT | Mod: S$GLB,,, | Performed by: FAMILY MEDICINE

## 2017-11-17 PROCEDURE — 99999 PR PBB SHADOW E&M-EST. PATIENT-LVL V: CPT | Mod: PBBFAC,,, | Performed by: PODIATRIST

## 2017-11-17 PROCEDURE — 99499 UNLISTED E&M SERVICE: CPT | Mod: S$GLB,,, | Performed by: ALLERGY & IMMUNOLOGY

## 2017-11-17 PROCEDURE — 99499 UNLISTED E&M SERVICE: CPT | Mod: S$GLB,,, | Performed by: PODIATRIST

## 2017-11-17 RX ORDER — ECONAZOLE NITRATE 10 MG/G
CREAM TOPICAL
COMMUNITY
Start: 2017-10-08 | End: 2018-07-21 | Stop reason: SDUPTHER

## 2017-11-17 RX ORDER — OMEPRAZOLE 20 MG/1
CAPSULE, DELAYED RELEASE ORAL
COMMUNITY
Start: 2017-09-17 | End: 2018-03-28 | Stop reason: SDUPTHER

## 2017-11-17 NOTE — PROGRESS NOTES
Patient here for allergy injection.    0.5 mL given SQ of 1:10 Yellow  in Upper Left arm.  Tolerated well.   Patient observed for 30 minutes.  1+ erythema only.    Pt commented that site itched a little bit.

## 2017-11-19 NOTE — PROGRESS NOTES
Subjective:      Patient ID: Vivienne Jose is a 67 y.o. female.    Chief Complaint: No chief complaint on file.    Vivienne is a 67 y.o. female who presents to the clinic for evaluation and treatment of high risk feet.  Vivienne has a past medical history of Allergy; Angio-edema; Arthritis; Cataract; Cerebrovascular malformation; Colon polyps; Coronary artery disease; Diabetes mellitus, type 2; Diabetic peripheral neuropathy; GERD (gastroesophageal reflux disease); Herpes infection; Hyperlipidemia; Hypertension; Hypothyroidism; Kidney stones; Nausea & vomiting (11/23/2015); Obesity, morbid; Ovarian cyst; Pyelonephritis, chronic; Seizure disorder, focal motor; Sleep apnea; Type II or unspecified type diabetes mellitus with neurological manifestations, uncontrolled(250.62); Urinary tract infection; Urticaria; and Vaginal infection.  This patient has documented high risk feet requiring routine maintenance secondary to diabetes mellitis and those secondary complications of diabetes, as mentioned.  Complains of redness, pain and swelling left big toe present for past several days. Denies trauma. Says she saw puss come out the distal tip of the toe as she was trimming the nail back.     9/27/16: Follow up from nail avulsion left hallux. Wound culture grew skin geronimo. Applied gentamicin for a few days but stopped due to oozing. Relates only slight pain when she touches it.     11/17/17: Returns to discuss further treatment of her painful big toenails. Accompanied by her .      PCP: Jay Jay Felton MD    Date Last Seen by PCP: 9/14/17  Current shoe gear:  Extra depth shoes    Hemoglobin A1C   Date Value Ref Range Status   09/08/2017 7.1 (H) 4.0 - 5.6 % Final     Comment:     According to ADA guidelines, hemoglobin A1c <7.0% represents  optimal control in non-pregnant diabetic patients. Different  metrics may apply to specific patient populations.   Standards of Medical Care in Diabetes-2016.  For the purpose of screening for  the presence of diabetes:  <5.7%     Consistent with the absence of diabetes  5.7-6.4%  Consistent with increasing risk for diabetes   (prediabetes)  >or=6.5%  Consistent with diabetes  Currently, no consensus exists for use of hemoglobin A1c  for diagnosis of diabetes for children.  This Hemoglobin A1c assay has significant interference with fetal   hemoglobin   (HbF). The results are invalid for patients with abnormal amounts of   HbF,   including those with known Hereditary Persistence   of Fetal Hemoglobin. Heterozygous hemoglobin variants (HbAS, HbAC,   HbAD, HbAE, HbA2) do not significantly interfere with this assay;   however, presence of multiple variants in a sample may impact the %   interference.     04/12/2017 6.9 (H) 4.5 - 6.2 % Final     Comment:     According to ADA guidelines, hemoglobin A1C <7.0% represents  optimal control in non-pregnant diabetic patients.  Different  metrics may apply to specific populations.   Standards of Medical Care in Diabetes - 2016.  For the purpose of screening for the presence of diabetes:  <5.7%     Consistent with the absence of diabetes  5.7-6.4%  Consistent with increasing risk for diabetes   (prediabetes)  >or=6.5%  Consistent with diabetes  Currently no consensus exists for use of hemoglobin A1C  for diagnosis of diabetes for children.     12/01/2016 7.0 (H) 4.5 - 6.2 % Final     Comment:     According to ADA guidelines, hemoglobin A1C <7.0% represents  optimal control in non-pregnant diabetic patients.  Different  metrics may apply to specific populations.   Standards of Medical Care in Diabetes - 2016.  For the purpose of screening for the presence of diabetes:  <5.7%     Consistent with the absence of diabetes  5.7-6.4%  Consistent with increasing risk for diabetes   (prediabetes)  >or=6.5%  Consistent with diabetes  Currently no consensus exists for use of hemoglobin A1C  for diagnosis of diabetes for children.       Vitals:    11/17/17 1100   BP: (!) 140/64  "  Pulse: 75   Weight: 112 kg (247 lb)   Height: 5' 2" (1.575 m)   PainSc:   1   PainLoc: Toe      Past Medical History:   Diagnosis Date    Allergy     Angio-edema     Arthritis     Cataract     Cerebrovascular malformation     Colon polyps     Coronary artery disease     Diabetes mellitus, type 2     Diabetic peripheral neuropathy     GERD (gastroesophageal reflux disease)     Herpes infection     Hyperlipidemia     Hypertension     Hypothyroidism     Kidney stones     Nausea & vomiting 11/23/2015    Obesity, morbid     Ovarian cyst     Pyelonephritis, chronic     Seizure disorder, focal motor     Sleep apnea     Type II or unspecified type diabetes mellitus with neurological manifestations, uncontrolled(250.62)     Urinary tract infection     Urticaria     Vaginal infection        Past Surgical History:   Procedure Laterality Date    CARPAL TUNNEL RELEASE Right 2014    COLONOSCOPY      COLONOSCOPY N/A 9/13/2017    Procedure: COLONOSCOPY Golytely;  Surgeon: Gisela Wall MD;  Location: [facility];  Service: Endoscopy;  Laterality: N/A;    CYST REMOVAL      EXTRACORPOREAL SHOCK WAVE LITHOTRIPSY  2002    HYSTERECTOMY  1984    THYROIDECTOMY  1977         TONSILLECTOMY, ADENOIDECTOMY      trigger finder       TUBAL LIGATION         Family History   Problem Relation Age of Onset    Cancer Father     Allergies Son     Skin cancer Mother     Macular degeneration Mother     Dementia Mother     No Known Problems Daughter     No Known Problems Daughter     No Known Problems Daughter     Glaucoma Maternal Aunt      Great Maternal Aunt    Leukemia Maternal Uncle     Amblyopia Neg Hx     Cataracts Neg Hx     Retinal detachment Neg Hx     Strabismus Neg Hx     Diabetes Neg Hx     Hypertension Neg Hx     Stroke Neg Hx     Thyroid disease Neg Hx     Kidney disease Neg Hx        Social History     Social History    Marital status:      Spouse name: N/A    Number " "of children: N/A    Years of education: N/A     Occupational History     [employer]     Social History Main Topics    Smoking status: Never Smoker    Smokeless tobacco: Never Used    Alcohol use No    Drug use: No    Sexual activity: Not Currently     Other Topics Concern    None     Social History Narrative    None       Current Outpatient Prescriptions   Medication Sig Dispense Refill    amlodipine (NORVASC) 5 MG tablet TAKE 1 TABLET EVERY DAY 90 tablet 3    aspirin (ECOTRIN) 81 MG EC tablet Take 81 mg by mouth once daily.      BD ULTRA-FINE EDUARDO PEN NEEDLES 32 gauge x 5/32" Ndle USE WITH VICTOZA AND LEVEMIR (2 INJECTIONS EVERY DAY) 100 each 3    biotin 10,000 mcg Cap Take by mouth.      blood-glucose meter Misc [employer] True Metrix Air meter 1 each 0    calcium carbonate-vitamin D3 600 mg(1,500mg) -100 unit Cap Take 1 tablet by mouth once daily.      econazole nitrate 1 % cream       fluticasone (FLONASE) 50 mcg/actuation nasal spray 2 sprays by Each Nare route once daily. 16 g 12    gabapentin (NEURONTIN) 300 MG capsule Take 1 capsule (300 mg total) by mouth every evening. 90 capsule 0    glimepiride (AMARYL) 4 MG tablet Take 1 tablet (4 mg total) by mouth daily with breakfast. 90 tablet 3    hydrochlorothiazide (HYDRODIURIL) 25 MG tablet Take 1 tablet (25 mg total) by mouth once daily. 90 tablet 3    L.acid-B.bifidum-B.animal-FOS (PROBIOTIC COMPLEX) 25 billion cell -100 mg Cap Take 1 capsule by mouth once daily.      LEVEMIR FLEXTOUCH 100 unit/mL (3 mL) InPn pen INJECT  14  UNITS SUBCUTANEOUSLY EVERY EVENING 15 mL 1    levothyroxine (SYNTHROID) 75 MCG tablet TAKE 1 TABLET ONE TIME DAILY 90 tablet 1    liraglutide 0.6 mg/0.1 mL, 18 mg/3 mL, subq PNIJ (VICTOZA 2-TAWANA) 0.6 mg/0.1 mL (18 mg/3 mL) PnIj Inject 1.2 mg into the skin once daily.      loratadine (CLARITIN) 10 mg tablet Take 10 mg by mouth once daily.      lovastatin (MEVACOR) 40 MG tablet TAKE 1 TABLET  NIGHTLY 90 tablet " 0    magnesium oxide-Mg AA chelate (MAGNESIUM, AMINO ACID CHELATE,) 133 mg Tab Take by mouth as directed.       metformin (GLUCOPHAGE) 1000 MG tablet TAKE 1 TABLET TWICE DAILY WITH MEALS 180 tablet 3    omeprazole (PRILOSEC) 20 MG capsule       TRUE METRIX GLUCOSE TEST STRIP Strp TEST TWO TIMES DAILY 200 strip 6    TRUEPLUS LANCETS 28 gauge Misc TEST TWO TIMES DAILY 200 each 6    UNABLE TO FIND Zatador eye drops for allergies      valacyclovir (VALTREX) 500 MG tablet TAKE 1 TABLET EVERY DAY 90 tablet 1    valsartan (DIOVAN) 160 MG tablet TAKE 1 TABLET TWICE DAILY 180 tablet 1    azelastine (ASTELIN) 137 mcg (0.1 %) nasal spray 1 spray (137 mcg total) by Nasal route daily as needed for Rhinitis. 90 mL 1    conjugated estrogens (PREMARIN) vaginal cream Place 0.5 g vaginally 3 (three) times a week. 30 g 3    diclofenac sodium (VOLTAREN) 1 % Gel Apply 2 g topically 4 (four) times daily. 3 Tube 3     Current Facility-Administered Medications   Medication Dose Route Frequency Provider Last Rate Last Dose    Allergy Mix   Subcutaneous 1 time in Clinic/HOD Ailin Mills MD        Allergy Mix   Subcutaneous 1 time in Clinic/HOD Ailin Mills MD           Allergies   Allergen Reactions    Sulfa (Sulfonamide Antibiotics) Nausea Only    Ciprofloxacin     Januvia [Sitagliptin]      abd pain    Lisinopril     Lotensin [Benazepril]     Nexium [Esomeprazole Magnesium] Other (See Comments)     Gas         Review of Systems   Constitution: Negative for chills, fever, weakness and malaise/fatigue.   Cardiovascular: Negative for chest pain, claudication and leg swelling.   Respiratory: Negative for cough and shortness of breath.    Skin: Positive for nail changes. Negative for color change, dry skin and itching.   Musculoskeletal: Positive for back pain. Negative for joint pain, muscle cramps and muscle weakness.   Gastrointestinal: Negative for nausea and vomiting.   Neurological: Positive for numbness  and paresthesias.   Psychiatric/Behavioral: Negative for altered mental status.           Objective:      Physical Exam   Constitutional: She is oriented to person, place, and time. She appears well-nourished. No distress.   Cardiovascular: Intact distal pulses.    Pulses:       Dorsalis pedis pulses are 2+ on the right side, and 2+ on the left side.        Posterior tibial pulses are 2+ on the right side, and 2+ on the left side.   CFT< 3 secs all toes bilateral foot, skin temp warm bilateral foot, no digital hair growth bilateral foot, no lower extremity edema bilateral.       Musculoskeletal:        Right foot: There is no deformity.        Left foot: There is no deformity.   Mild pain plantar 2 MTP, - Lauchman test bilateral foot. Gastrocnemius equinus bilateral. Rectus foot type with adductovarus rotation of fifth toe bilateral. No pain with ROM or MMT bilateral foot.   Feet:   Right Foot:   Protective Sensation: 10 sites tested. 10 sites sensed.   Skin Integrity: Negative for ulcer, blister, skin breakdown, erythema, warmth, callus or dry skin.   Left Foot:   Protective Sensation: 10 sites tested. 10 sites sensed.   Skin Integrity: Negative for ulcer, blister, skin breakdown, erythema, warmth, callus or dry skin.   Neurological: She is alert and oriented to person, place, and time. She has normal strength. No sensory deficit.   Vibratory sensation intact bilateral foot.       Skin: Skin is warm, dry and intact. No ecchymosis and no rash noted. She is not diaphoretic. No cyanosis or erythema. No pallor. Nails show no clubbing.   Left hallux nail is grown out with moderately incurvated distal medial and lateral nail borders with localized pain and mild edema, no erythema.    Right hallux nail is thickened, elongated, yellow and dystrophic with incurvated medial and lateral nail borders and localized pain on palpation. It is also growing in superior direction noting hypertrophy of nail bed.    Remaining nails 2-5  b/l normotrophic and elongated.     No open lesions or macerations bilateral lower extremity.                     Assessment:       Encounter Diagnoses   Name Primary?    Controlled type 2 diabetes mellitus without complication, with long-term current use of insulin Yes    Onychocryptosis     Pincer nail deformity          Plan:       Diagnoses and all orders for this visit:    Controlled type 2 diabetes mellitus without complication, with long-term current use of insulin    Onychocryptosis    Pincer nail deformity      I counseled the patient on her conditions, their implications and medical management.    Shoe inspection. Diabetic Foot Education. Patient reminded of the importance of good nutrition and blood sugar control to help prevent podiatric complications of diabetes. Patient instructed on proper foot hygeine. We discussed wearing proper shoe gear, daily foot inspections, never walking without protective shoe gear, never putting sharp instruments to feet, routine podiatric nail visits every 2-3 months. Discussed shoes with adequate toe box space to prevent excessive pressure and rubbing along the toenails.     Discussed conservative vs surgical treatment of recurrent painful ingrown toenails with associated risks and benefits.    RTC 2 weeks or prn for total nail avulsion with matrixectomy bilateral hallux.

## 2017-11-20 ENCOUNTER — CLINICAL SUPPORT (OUTPATIENT)
Dept: INTERNAL MEDICINE | Facility: CLINIC | Age: 67
End: 2017-11-20
Payer: MEDICARE

## 2017-11-20 DIAGNOSIS — J30.9 CHRONIC ALLERGIC RHINITIS, UNSPECIFIED SEASONALITY, UNSPECIFIED TRIGGER: ICD-10-CM

## 2017-11-20 PROCEDURE — 95115 IMMUNOTHERAPY ONE INJECTION: CPT | Mod: S$GLB,,, | Performed by: FAMILY MEDICINE

## 2017-11-20 PROCEDURE — 99499 UNLISTED E&M SERVICE: CPT | Mod: S$GLB,,, | Performed by: ALLERGY & IMMUNOLOGY

## 2017-11-20 NOTE — PROGRESS NOTES
Patient here for allergy injection.    0.1mL given SQ of 1:11 Red in Upper Left arm.  Tolerated well.   Patient observed for 30 minutes.  0 reaction noted    Pt voiced no complaints..

## 2017-11-21 ENCOUNTER — PATIENT MESSAGE (OUTPATIENT)
Dept: RESEARCH | Facility: HOSPITAL | Age: 67
End: 2017-11-21

## 2017-11-27 ENCOUNTER — CLINICAL SUPPORT (OUTPATIENT)
Dept: INTERNAL MEDICINE | Facility: CLINIC | Age: 67
End: 2017-11-27
Payer: MEDICARE

## 2017-11-27 DIAGNOSIS — J30.9 CHRONIC ALLERGIC RHINITIS, UNSPECIFIED SEASONALITY, UNSPECIFIED TRIGGER: ICD-10-CM

## 2017-11-27 PROCEDURE — 99499 UNLISTED E&M SERVICE: CPT | Mod: S$GLB,,, | Performed by: ALLERGY & IMMUNOLOGY

## 2017-11-27 PROCEDURE — 95115 IMMUNOTHERAPY ONE INJECTION: CPT | Mod: S$GLB,,, | Performed by: FAMILY MEDICINE

## 2017-11-27 NOTE — PROGRESS NOTES
Patient here for allergy injection.    0.15mL given SQ of 1:11 Red in Upper Left arm.  Tolerated well.   Patient observed for 30 minutes.  0 reaction noted    Pt voiced no complaints..

## 2017-11-28 ENCOUNTER — PATIENT OUTREACH (OUTPATIENT)
Dept: OTHER | Facility: OTHER | Age: 67
End: 2017-11-28

## 2017-11-28 ENCOUNTER — OFFICE VISIT (OUTPATIENT)
Dept: ORTHOPEDICS | Facility: CLINIC | Age: 67
End: 2017-11-28
Payer: MEDICARE

## 2017-11-28 VITALS — WEIGHT: 246.94 LBS | BODY MASS INDEX: 45.44 KG/M2 | HEIGHT: 62 IN

## 2017-11-28 DIAGNOSIS — M17.11 PRIMARY OSTEOARTHRITIS OF RIGHT KNEE: Primary | ICD-10-CM

## 2017-11-28 PROCEDURE — 99213 OFFICE O/P EST LOW 20 MIN: CPT | Mod: 25,S$GLB,, | Performed by: PHYSICIAN ASSISTANT

## 2017-11-28 PROCEDURE — 20610 DRAIN/INJ JOINT/BURSA W/O US: CPT | Mod: RT,S$GLB,, | Performed by: PHYSICIAN ASSISTANT

## 2017-11-28 PROCEDURE — 99999 PR PBB SHADOW E&M-EST. PATIENT-LVL IV: CPT | Mod: PBBFAC,,, | Performed by: PHYSICIAN ASSISTANT

## 2017-11-28 RX ORDER — TRIAMCINOLONE ACETONIDE 40 MG/ML
40 INJECTION, SUSPENSION INTRA-ARTICULAR; INTRAMUSCULAR
Status: COMPLETED | OUTPATIENT
Start: 2017-11-28 | End: 2017-11-28

## 2017-11-28 RX ADMIN — TRIAMCINOLONE ACETONIDE 40 MG: 40 INJECTION, SUSPENSION INTRA-ARTICULAR; INTRAMUSCULAR at 11:11

## 2017-11-28 NOTE — PROGRESS NOTES
Last 5 Patient Entered Readings Current 30 Day Average: 140/69     Recent Readings 11/27/2017 11/27/2017 11/26/2017 11/25/2017 11/24/2017    Systolic BP (mmHg) 147 152 134 138 136    Diastolic BP (mmHg) 69 67 64 66 68    Pulse 77 76 81 73 66        Hypertension Digital Medicine Program (HDMP): Health  Follow Up    Lifestyle Modifications:    1.Low sodium diet: no Mrs. Jose denies any changes to her diet. Her plan for 2018 is to start preparing most of her own meals.    2.Physical activity: no Patient reports that her knees have been bothering her. It was recommended to her that she start using the stationary bike to help exercise her knee. Mrs. Jose thinks that she will be able to start using her gym membership to ride the stationary bike.     3.Hypotension/Hypertension symptoms: no   Frequency/Alleviating factors/Precipitating factors, etc.     4.Patient has been compliant with the medication regimen.     Follow up with Mrs. Vivienne Jose completed. Mrs. Jose is doing okay. Patient was happy to see that her BP is at goal. However, she was unaware of the updated guidelines for HTN. No further questions or concerns. I will follow up in a few weeks to assess progress.

## 2017-11-28 NOTE — PROGRESS NOTES
CC:  Knee pain    HX:  Vivienne Jose returns for re-evaluation of right knee arthritis. She was last seen by me 5/16/2017.  Today she is doing well but notes the steroid injection gave her good relief but did not last as long as her previous injection.  She has a known history of osteoarthritis of the right knee. She localizes the pain medial and describes the pain as achy. She cannot take NSAID's.  She has tried prior injection therapy.     PE:  On physical examination there is not an effusion in the knee.  The knee is ligamentally stable to varus/valgus stress.  There is no warmth or erythema. There is no specific pain over the pes anserine bursa. ROM is 0 to 110.    ROM of the hip does not reproduce pain.  There is no significant distal edema, and there is no calf tenderness to palpation.     Impression:  There are no diagnoses linked to this encounter.      Plan:  The conservative options including NSAIDs, activity modification, physical therapy, corticosteroid injection, and viscosupplimentation were discussed. She is not interested in surgical intervention at this time.  She would like to try another steroid injection before considering Synvisc.    PROCEDURE:  I have explained the risks, benefits, and alternatives of the procedure in detail.  The patient voices understanding and all questions have been answered.  The patient agrees to proceed as planned. So after I performed a sterile prep of the skin in the normal fashion the right knee is injected using a 22 gauge needle from the anterolateral approach with a combination of 4cc 1% plain lidocaine and 40 mg of kenalog.  The patient is cautioned an immediate relief of pain is secondary to the local anesthetic and will be temporary.  After the anesthetic wears off there may be a increase in pain that may last for a few hours or a few days and they should use ice to help alleviate this flair up of pain.

## 2017-12-01 ENCOUNTER — OFFICE VISIT (OUTPATIENT)
Dept: PODIATRY | Facility: CLINIC | Age: 67
End: 2017-12-01
Payer: MEDICARE

## 2017-12-01 VITALS
WEIGHT: 246 LBS | DIASTOLIC BLOOD PRESSURE: 75 MMHG | BODY MASS INDEX: 45.27 KG/M2 | SYSTOLIC BLOOD PRESSURE: 147 MMHG | HEIGHT: 62 IN | HEART RATE: 67 BPM

## 2017-12-01 DIAGNOSIS — E11.9 CONTROLLED TYPE 2 DIABETES MELLITUS WITHOUT COMPLICATION, WITH LONG-TERM CURRENT USE OF INSULIN: ICD-10-CM

## 2017-12-01 DIAGNOSIS — L60.0 ONYCHOCRYPTOSIS: Primary | ICD-10-CM

## 2017-12-01 DIAGNOSIS — Z79.4 CONTROLLED TYPE 2 DIABETES MELLITUS WITHOUT COMPLICATION, WITH LONG-TERM CURRENT USE OF INSULIN: ICD-10-CM

## 2017-12-01 DIAGNOSIS — L60.8 PINCER NAIL DEFORMITY: ICD-10-CM

## 2017-12-01 PROCEDURE — 99999 PR PBB SHADOW E&M-EST. PATIENT-LVL V: CPT | Mod: PBBFAC,,, | Performed by: PODIATRIST

## 2017-12-01 PROCEDURE — 99499 UNLISTED E&M SERVICE: CPT | Mod: S$GLB,,, | Performed by: PODIATRIST

## 2017-12-01 PROCEDURE — 11750 EXCISION NAIL&NAIL MATRIX: CPT | Mod: T5,S$GLB,, | Performed by: PODIATRIST

## 2017-12-01 RX ORDER — SILVER SULFADIAZINE 10 G/1000G
CREAM TOPICAL 2 TIMES DAILY
Qty: 25 G | Refills: 1 | Status: SHIPPED | OUTPATIENT
Start: 2017-12-01 | End: 2018-10-02

## 2017-12-02 ENCOUNTER — OFFICE VISIT (OUTPATIENT)
Dept: URGENT CARE | Facility: CLINIC | Age: 67
End: 2017-12-02
Payer: MEDICARE

## 2017-12-02 VITALS
TEMPERATURE: 98 F | DIASTOLIC BLOOD PRESSURE: 62 MMHG | SYSTOLIC BLOOD PRESSURE: 152 MMHG | HEIGHT: 62 IN | WEIGHT: 250 LBS | HEART RATE: 65 BPM | BODY MASS INDEX: 46.01 KG/M2 | OXYGEN SATURATION: 97 % | RESPIRATION RATE: 18 BRPM

## 2017-12-02 DIAGNOSIS — R30.0 DYSURIA: Primary | ICD-10-CM

## 2017-12-02 DIAGNOSIS — N39.0 URINARY TRACT INFECTION WITHOUT HEMATURIA, SITE UNSPECIFIED: ICD-10-CM

## 2017-12-02 LAB
BILIRUB UR QL STRIP: POSITIVE
GLUCOSE UR QL STRIP: NEGATIVE
KETONES UR QL STRIP: NEGATIVE
LEUKOCYTE ESTERASE UR QL STRIP: POSITIVE
PH, POC UA: 7.5 (ref 5–8)
POC BLOOD, URINE: POSITIVE
POC NITRATES, URINE: NEGATIVE
PROT UR QL STRIP: POSITIVE
SP GR UR STRIP: 1 (ref 1–1.03)
UROBILINOGEN UR STRIP-ACNC: NORMAL (ref 0.1–1.1)

## 2017-12-02 PROCEDURE — 99214 OFFICE O/P EST MOD 30 MIN: CPT | Mod: 25,S$GLB,, | Performed by: FAMILY MEDICINE

## 2017-12-02 PROCEDURE — 81003 URINALYSIS AUTO W/O SCOPE: CPT | Mod: QW,S$GLB,, | Performed by: FAMILY MEDICINE

## 2017-12-02 RX ORDER — NITROFURANTOIN 25; 75 MG/1; MG/1
100 CAPSULE ORAL 2 TIMES DAILY
Qty: 14 CAPSULE | Refills: 0 | Status: SHIPPED | OUTPATIENT
Start: 2017-12-02 | End: 2017-12-09

## 2017-12-02 RX ORDER — PHENAZOPYRIDINE HYDROCHLORIDE 200 MG/1
200 TABLET, FILM COATED ORAL 3 TIMES DAILY PRN
Qty: 6 TABLET | Refills: 0 | Status: SHIPPED | OUTPATIENT
Start: 2017-12-02 | End: 2017-12-04

## 2017-12-02 RX ORDER — NITROFURANTOIN (MACROCRYSTALS) 100 MG/1
100 CAPSULE ORAL EVERY 12 HOURS
Qty: 20 CAPSULE | Refills: 0 | Status: SHIPPED | OUTPATIENT
Start: 2017-12-02 | End: 2017-12-12

## 2017-12-02 NOTE — PATIENT INSTRUCTIONS
"  Elevated Blood Pressure  Your blood pressure was elevated during your visit to the urgent care.  It was not so high that immediate care was needed but it is recommended that you monitor your blood pressure over the next week or two to make sure that it is not staying elevated.  Please have your blood pressure taken 2-3 times daily at different times of the day.  Write all of those blood pressures down and record the time that they were taken.  Keep all that information and take it with you to see your Primary Care Physician.  If your blood pressure is consistently above 140/90 you will need to follow up with your PCP more quickly      Bladder Infection, Female (Adult)    Urine is normally doesn't have any bacteria in it. But bacteria can get into the urinary tract from the skin around the rectum. Or they can travel in the blood from elsewhere in the body. Once they are in your urinary tract, they can cause infection in the urethra (urethritis), the bladder (cystitis), or the kidneys (pyelonephritis).  The most common place for an infection is in the bladder. This is called a bladder infection. This is one of the most common infections in women. Most bladder infections are easily treated. They are not serious unless the infection spreads to the kidney.  The phrases "bladder infection," "UTI," and "cystitis" are often used to describe the same thing. But they are not always the same. Cystitis is an inflammation of the bladder. The most common cause of cystitis is an infection.  Symptoms  The infection causes inflammation in the urethra and bladder. This causes many of the symptoms. The most common symptoms of a bladder infection are:  · Pain or burning when urinating  · Having to urinate more often than usual  · Urgent need to urinate  · Only a small amount of urine comes out  · Blood in urine  · Abdominal discomfort. This is usually in the lower abdomen above the pubic bone.  · Cloudy urine  · Strong- or " bad-smelling urine  · Unable to urinate (urinary retention)  · Unable to hold urine in (urinary incontinence)  · Fever  · Loss of appetite  · Confusion (in older adults)  Causes  Bladder infections are not contagious. You can't get one from someone else, from a toilet seat, or from sharing a bath.  The most common cause of bladder infections is bacteria from the bowels. The bacteria get onto the skin around the opening of the urethra. From there, they can get into the urine and travel up to the bladder, causing inflammation and infection. This usually happens because of:  · Wiping improperly after urinating. Always wipe from front to back.  · Bowel incontinence  · Pregnancy  · Procedures such as having a catheter inserted  · Older age  · Not emptying your bladder. This can allow bacteria a chance to grow in your urine.  · Dehydration  · Constipation  · Sex  · Use of a diaphragm for birth control   Treatment  Bladder infections are diagnosed by a urine test. They are treated with antibiotics and usually clear up quickly without complications. Treatment helps prevent a more serious kidney infection.  Medicines  Medicines can help in the treatment of a bladder infection:  · Take antibiotics until they are used up, even if you feel better. It is important to finish them to make sure the infection has cleared.  · You can use acetaminophen or ibuprofen for pain, fever, or discomfort, unless another medicine was prescribed. If you have chronic liver or kidney disease, talk with your healthcare provider before using these medicines. Also talk with your provider if you've ever had a stomach ulcer or gastrointestinal bleeding, or are taking blood-thinner medicines.  · If you are given phenazopydridine to reduce burning with urination, it will cause your urine to become a bright orange color. This can stain clothing.  Care and prevention  These self-care steps can help prevent future infections:  · Drink plenty of fluids to  prevent dehydration and flush out your bladder. Do this unless you must restrict fluids for other health reasons, or your doctor told you not to.  · Proper cleaning after going to the bathroom is important. Wipe from front to back after using the toilet to prevent the spread of bacteria.  · Urinate more often. Don't try to hold urine in for a long time.  · Wear loose-fitting clothes and cotton underwear. Avoid tight-fitting pants.  · Improve your diet and prevent constipation. Eat more fresh fruit and vegetables, and fiber, and less junk and fatty foods.  · Avoid sex until your symptoms are gone.  · Avoid caffeine, alcohol, and spicy foods. These can irritate your bladder.  · Urinate right after intercourse to flush out your bladder.  · If you use birth control pills and have frequent bladder infections, discuss it with your doctor.  Follow-up care  Call your healthcare provider if all symptoms are not gone after 3 days of treatment. This is especially important if you have repeat infections.  If a culture was done, you will be told if your treatment needs to be changed. If directed, you can call to find out the results.  If X-rays were done, you will be told if the results will affect your treatment.  Call 911  Call 911 if any of the following occur:  · Trouble breathing  · Hard to wake up or confusion  · Fainting or loss of consciousness  · Rapid heart rate  When to seek medical advice  Call your healthcare provider right away if any of these occur:  · Fever of 100.4ºF (38.0ºC) or higher, or as directed by your healthcare provider  · Symptoms are not better by the third day of treatment  · Back or belly (abdominal) pain that gets worse  · Repeated vomiting, or unable to keep medicine down  · Weakness or dizziness  · Vaginal discharge  · Pain, redness, or swelling in the outer vaginal area (labia)  Date Last Reviewed: 10/1/2016  © 6620-5143 [employer]. [address] [place] [address], [place] [address]. All  rights reserved. This information is not intended as a substitute for professional medical care. Always follow your healthcare professional's instructions.      Vivienne was seen today for urinary tract infection.    Diagnoses and all orders for this visit:    Dysuria  -     POCT Urinalysis, Dipstick, Automated, W/O Scope    Urinary tract infection without hematuria, site unspecified  -     nitrofurantoin, macrocrystal-monohydrate, (MACROBID) 100 MG capsule; Take 1 capsule (100 mg total) by mouth 2 (two) times daily.  -     phenazopyridine (PYRIDIUM) 200 MG tablet; Take 1 tablet (200 mg total) by mouth 3 (three) times daily as needed for Pain.  -     Urine culture            Follow Up Comments   Make sure that you follow up with your primary care doctor in the next 2-5 days if needed .  Return to the Urgent Care if signs or symptoms change and certainly if you have worsening symptoms go to the nearest emergency department for further evaluation.     Medina Dyson MD

## 2017-12-02 NOTE — PROGRESS NOTES
"Subjective:       Patient ID: Vivienne Jose is a 67 y.o. female.    Vitals:  height is 5' 2" (1.575 m) and weight is 113.4 kg (250 lb). Her temperature is 98.1 °F (36.7 °C). Her blood pressure is 152/62 (abnormal) and her pulse is 65. Her respiration is 18 and oxygen saturation is 97%.     Chief Complaint: Urinary Tract Infection    Patient has a hsitory of recurrent UTIs.  She has a GYN physician who specializes in urinary issues.  She reports this feels like the beginnings of a UTI for her.       Urinary Tract Infection    This is a recurrent problem. The current episode started today. The problem occurs every urination. The problem has been gradually worsening. The quality of the pain is described as burning. The pain is at a severity of 3/10. The pain is mild. There has been no fever. Associated symptoms include frequency and urgency. Pertinent negatives include no chills, hematuria, nausea or vomiting. She has tried home medications and increased fluids for the symptoms. The treatment provided no relief. Her past medical history is significant for catheterization, diabetes mellitus, hypertension and kidney stones.     Review of Systems   Constitution: Negative for chills and fever.   Skin: Negative for itching.   Musculoskeletal: Positive for back pain.   Gastrointestinal: Negative for abdominal pain, nausea and vomiting.   Genitourinary: Positive for dysuria, frequency and urgency. Negative for genital sores, hematuria, missed menses and non-menstrual bleeding.       Objective:      Physical Exam   Constitutional: She is oriented to person, place, and time. She appears well-developed and well-nourished.   HENT:   Head: Normocephalic and atraumatic.   Right Ear: External ear normal.   Left Ear: External ear normal.   Nose: Nose normal. No nasal deformity. No epistaxis.   Mouth/Throat: Oropharynx is clear and moist and mucous membranes are normal.   Eyes: Conjunctivae and lids are normal.   Neck: Trachea normal, " normal range of motion and phonation normal. Neck supple.   Cardiovascular: Normal rate, regular rhythm, normal heart sounds and normal pulses.    Pulmonary/Chest: Effort normal and breath sounds normal.   Abdominal: Soft. Normal appearance and bowel sounds are normal. She exhibits no distension and no mass. There is no tenderness (Patient has an obese abdomen that is not tender at this time). There is no rigidity, no rebound, no guarding and no CVA tenderness.   Neurological: She is alert and oriented to person, place, and time.   Skin: Skin is warm, dry and intact.   Psychiatric: She has a normal mood and affect. Her speech is normal and behavior is normal. Cognition and memory are normal.   Nursing note and vitals reviewed.      Assessment:       1. Dysuria    2. Urinary tract infection without hematuria, site unspecified        Plan:       Her GYN has asked for urine culture any time she is treated so we are going to do this today.     Dysuria  -     POCT Urinalysis, Dipstick, Automated, W/O Scope    Urinary tract infection without hematuria, site unspecified  -     nitrofurantoin, macrocrystal-monohydrate, (MACROBID) 100 MG capsule; Take 1 capsule (100 mg total) by mouth 2 (two) times daily.  Dispense: 14 capsule; Refill: 0  -     phenazopyridine (PYRIDIUM) 200 MG tablet; Take 1 tablet (200 mg total) by mouth 3 (three) times daily as needed for Pain.  Dispense: 6 tablet; Refill: 0  -     Urine culture      Follow Up Comments   Make sure that you follow up with your primary care doctor in the next 2-5 days if needed .  Return to the Urgent Care if signs or symptoms change and certainly if you have worsening symptoms go to the nearest emergency department for further evaluation.

## 2017-12-04 ENCOUNTER — CLINICAL SUPPORT (OUTPATIENT)
Dept: INTERNAL MEDICINE | Facility: CLINIC | Age: 67
End: 2017-12-04
Payer: MEDICARE

## 2017-12-04 DIAGNOSIS — J30.9 CHRONIC ALLERGIC RHINITIS, UNSPECIFIED SEASONALITY, UNSPECIFIED TRIGGER: ICD-10-CM

## 2017-12-04 PROCEDURE — 95115 IMMUNOTHERAPY ONE INJECTION: CPT | Mod: S$GLB,,, | Performed by: FAMILY MEDICINE

## 2017-12-04 PROCEDURE — 99499 UNLISTED E&M SERVICE: CPT | Mod: S$GLB,,, | Performed by: ALLERGY & IMMUNOLOGY

## 2017-12-04 NOTE — PROGRESS NOTES
Patient here for allergy injection.    0.2mL given SQ of 1:11 Red in Upper Left arm.  Tolerated well.   Patient observed for 30 minutes.  0 reaction noted    Pt voiced no complaints..

## 2017-12-04 NOTE — PROGRESS NOTES
Subjective:      Patient ID: Vivienne Jose is a 67 y.o. female.    Chief Complaint: Ingrown Toenail (Bilateral hallux)    Vivienne is a 67 y.o. female who presents to the clinic for evaluation and treatment of high risk feet.  Vivienne has a past medical history of Allergy; Angio-edema; Arthritis; Cataract; Cerebrovascular malformation; Colon polyps; Coronary artery disease; Diabetes mellitus, type 2; Diabetic peripheral neuropathy; GERD (gastroesophageal reflux disease); Herpes infection; Hyperlipidemia; Hypertension; Hypothyroidism; Kidney stones; Nausea & vomiting (11/23/2015); Obesity, morbid; Ovarian cyst; Pyelonephritis, chronic; Seizure disorder, focal motor; Sleep apnea; Type II or unspecified type diabetes mellitus with neurological manifestations, uncontrolled(250.62); Urinary tract infection; Urticaria; and Vaginal infection.  This patient has documented high risk feet requiring routine maintenance secondary to diabetes mellitis and those secondary complications of diabetes, as mentioned.  Complains of redness, pain and swelling left big toe present for past several days. Denies trauma. Says she saw puss come out the distal tip of the toe as she was trimming the nail back.     9/27/16: Follow up from nail avulsion left hallux. Wound culture grew skin geronimo. Applied gentamicin for a few days but stopped due to oozing. Relates only slight pain when she touches it.     11/17/17: Returns to discuss further treatment of her painful big toenails. Accompanied by her .    12/1/17: Returns for bilateral hallux P&A matrixectomy. No new complaints. Accompanied by her .    PCP: Jay Jay Felton MD    Date Last Seen by PCP: 9/14/17  Current shoe gear:  Extra depth shoes    Hemoglobin A1C   Date Value Ref Range Status   09/08/2017 7.1 (H) 4.0 - 5.6 % Final     Comment:     According to ADA guidelines, hemoglobin A1c <7.0% represents  optimal control in non-pregnant diabetic patients. Different  metrics may apply to  specific patient populations.   Standards of Medical Care in Diabetes-2016.  For the purpose of screening for the presence of diabetes:  <5.7%     Consistent with the absence of diabetes  5.7-6.4%  Consistent with increasing risk for diabetes   (prediabetes)  >or=6.5%  Consistent with diabetes  Currently, no consensus exists for use of hemoglobin A1c  for diagnosis of diabetes for children.  This Hemoglobin A1c assay has significant interference with fetal   hemoglobin   (HbF). The results are invalid for patients with abnormal amounts of   HbF,   including those with known Hereditary Persistence   of Fetal Hemoglobin. Heterozygous hemoglobin variants (HbAS, HbAC,   HbAD, HbAE, HbA2) do not significantly interfere with this assay;   however, presence of multiple variants in a sample may impact the %   interference.     04/12/2017 6.9 (H) 4.5 - 6.2 % Final     Comment:     According to ADA guidelines, hemoglobin A1C <7.0% represents  optimal control in non-pregnant diabetic patients.  Different  metrics may apply to specific populations.   Standards of Medical Care in Diabetes - 2016.  For the purpose of screening for the presence of diabetes:  <5.7%     Consistent with the absence of diabetes  5.7-6.4%  Consistent with increasing risk for diabetes   (prediabetes)  >or=6.5%  Consistent with diabetes  Currently no consensus exists for use of hemoglobin A1C  for diagnosis of diabetes for children.     12/01/2016 7.0 (H) 4.5 - 6.2 % Final     Comment:     According to ADA guidelines, hemoglobin A1C <7.0% represents  optimal control in non-pregnant diabetic patients.  Different  metrics may apply to specific populations.   Standards of Medical Care in Diabetes - 2016.  For the purpose of screening for the presence of diabetes:  <5.7%     Consistent with the absence of diabetes  5.7-6.4%  Consistent with increasing risk for diabetes   (prediabetes)  >or=6.5%  Consistent with diabetes  Currently no consensus exists for use  "of hemoglobin A1C  for diagnosis of diabetes for children.       Vitals:    12/01/17 0751   BP: (!) 147/75   Pulse: 67   Weight: 111.6 kg (246 lb)   Height: 5' 2" (1.575 m)   PainSc: 0-No pain      Past Medical History:   Diagnosis Date    Allergy     Angio-edema     Arthritis     Cataract     Cerebrovascular malformation     Colon polyps     Coronary artery disease     Diabetes mellitus, type 2     Diabetic peripheral neuropathy     GERD (gastroesophageal reflux disease)     Herpes infection     Hyperlipidemia     Hypertension     Hypothyroidism     Kidney stones     Nausea & vomiting 11/23/2015    Obesity, morbid     Ovarian cyst     Pyelonephritis, chronic     Seizure disorder, focal motor     Sleep apnea     Type II or unspecified type diabetes mellitus with neurological manifestations, uncontrolled(250.62)     Urinary tract infection     Urticaria     Vaginal infection        Past Surgical History:   Procedure Laterality Date    CARPAL TUNNEL RELEASE Right 2014    COLONOSCOPY      COLONOSCOPY N/A 9/13/2017    Procedure: COLONOSCOPY Golytely;  Surgeon: Gisela Wall MD;  Location: [facility];  Service: Endoscopy;  Laterality: N/A;    CYST REMOVAL      EXTRACORPOREAL SHOCK WAVE LITHOTRIPSY  2002    HYSTERECTOMY  1984    THYROIDECTOMY  1977         TONSILLECTOMY, ADENOIDECTOMY      trigger finder       TUBAL LIGATION         Family History   Problem Relation Age of Onset    Cancer Father     Allergies Son     Skin cancer Mother     Macular degeneration Mother     Dementia Mother     No Known Problems Daughter     No Known Problems Daughter     No Known Problems Daughter     Glaucoma Maternal Aunt      Great Maternal Aunt    Leukemia Maternal Uncle     Amblyopia Neg Hx     Cataracts Neg Hx     Retinal detachment Neg Hx     Strabismus Neg Hx     Diabetes Neg Hx     Hypertension Neg Hx     Stroke Neg Hx     Thyroid disease Neg Hx     Kidney disease Neg Hx  " "      Social History     Social History    Marital status:      Spouse name: N/A    Number of children: N/A    Years of education: N/A     Occupational History    retired [employer]     Social History Main Topics    Smoking status: Never Smoker    Smokeless tobacco: Never Used    Alcohol use No    Drug use: No    Sexual activity: Not Currently     Other Topics Concern    None     Social History Narrative    None       Current Outpatient Prescriptions   Medication Sig Dispense Refill    amlodipine (NORVASC) 5 MG tablet TAKE 1 TABLET EVERY DAY 90 tablet 3    aspirin (ECOTRIN) 81 MG EC tablet Take 81 mg by mouth once daily.      BD ULTRA-FINE EDUARDO PEN NEEDLES 32 gauge x 5/32" Ndle USE WITH VICTOZA AND LEVEMIR (2 INJECTIONS EVERY DAY) 100 each 3    biotin 10,000 mcg Cap Take by mouth.      blood-glucose meter Misc [employer] True Metrix Air meter 1 each 0    calcium carbonate-vitamin D3 600 mg(1,500mg) -100 unit Cap Take 1 tablet by mouth once daily.      econazole nitrate 1 % cream       fluticasone (FLONASE) 50 mcg/actuation nasal spray 2 sprays by Each Nare route once daily. 16 g 12    gabapentin (NEURONTIN) 300 MG capsule Take 1 capsule (300 mg total) by mouth every evening. 90 capsule 0    glimepiride (AMARYL) 4 MG tablet Take 1 tablet (4 mg total) by mouth daily with breakfast. 90 tablet 3    hydrochlorothiazide (HYDRODIURIL) 25 MG tablet Take 1 tablet (25 mg total) by mouth once daily. 90 tablet 3    L.acid-B.bifidum-B.animal-FOS (PROBIOTIC COMPLEX) 25 billion cell -100 mg Cap Take 1 capsule by mouth once daily.      LEVEMIR FLEXTOUCH 100 unit/mL (3 mL) InPn pen INJECT  14  UNITS SUBCUTANEOUSLY EVERY EVENING 15 mL 1    levothyroxine (SYNTHROID) 75 MCG tablet TAKE 1 TABLET ONE TIME DAILY 90 tablet 1    liraglutide 0.6 mg/0.1 mL, 18 mg/3 mL, subq PNIJ (VICTOZA 2-TAWANA) 0.6 mg/0.1 mL (18 mg/3 mL) PnIj Inject 1.2 mg into the skin once daily.      loratadine (CLARITIN) 10 mg " tablet Take 10 mg by mouth once daily.      lovastatin (MEVACOR) 40 MG tablet TAKE 1 TABLET  NIGHTLY 90 tablet 0    magnesium oxide-Mg AA chelate (MAGNESIUM, AMINO ACID CHELATE,) 133 mg Tab Take by mouth as directed.       metformin (GLUCOPHAGE) 1000 MG tablet TAKE 1 TABLET TWICE DAILY WITH MEALS 180 tablet 3    omeprazole (PRILOSEC) 20 MG capsule       TRUE METRIX GLUCOSE TEST STRIP Strp TEST TWO TIMES DAILY 200 strip 6    TRUEPLUS LANCETS 28 gauge Misc TEST TWO TIMES DAILY 200 each 6    UNABLE TO FIND Zatador eye drops for allergies      valacyclovir (VALTREX) 500 MG tablet TAKE 1 TABLET EVERY DAY 90 tablet 1    valsartan (DIOVAN) 160 MG tablet TAKE 1 TABLET TWICE DAILY 180 tablet 1    azelastine (ASTELIN) 137 mcg (0.1 %) nasal spray 1 spray (137 mcg total) by Nasal route daily as needed for Rhinitis. 90 mL 1    conjugated estrogens (PREMARIN) vaginal cream Place 0.5 g vaginally 3 (three) times a week. 30 g 3    diclofenac sodium (VOLTAREN) 1 % Gel Apply 2 g topically 4 (four) times daily. 3 Tube 3    nitrofurantoin (MACRODANTIN) 100 MG capsule Take 1 capsule (100 mg total) by mouth every 12 (twelve) hours. 20 capsule 0    nitrofurantoin, macrocrystal-monohydrate, (MACROBID) 100 MG capsule Take 1 capsule (100 mg total) by mouth 2 (two) times daily. 14 capsule 0    phenazopyridine (PYRIDIUM) 200 MG tablet Take 1 tablet (200 mg total) by mouth 3 (three) times daily as needed for Pain. 6 tablet 0    silver sulfADIAZINE 1% (SILVADENE) 1 % cream Apply topically 2 (two) times daily. 25 g 1     Current Facility-Administered Medications   Medication Dose Route Frequency Provider Last Rate Last Dose    Allergy Mix   Subcutaneous 1 time in Clinic/HOD Ailin Mills MD        Allergy Mix   Subcutaneous 1 time in Clinic/HOD Ailin Mills MD           Allergies   Allergen Reactions    Sulfa (Sulfonamide Antibiotics) Nausea Only    Ciprofloxacin     Januvia [Sitagliptin]      abd pain     Lisinopril     Lotensin [Benazepril]     Nexium [Esomeprazole Magnesium] Other (See Comments)     Gas         Review of Systems   Constitution: Negative for chills, fever, weakness and malaise/fatigue.   Cardiovascular: Negative for chest pain, claudication and leg swelling.   Respiratory: Negative for cough and shortness of breath.    Skin: Positive for nail changes. Negative for color change, dry skin and itching.   Musculoskeletal: Positive for back pain. Negative for joint pain, muscle cramps and muscle weakness.   Gastrointestinal: Negative for nausea and vomiting.   Neurological: Positive for numbness and paresthesias.   Psychiatric/Behavioral: Negative for altered mental status.           Objective:      Physical Exam   Constitutional: She is oriented to person, place, and time. She appears well-nourished. No distress.   Cardiovascular: Intact distal pulses.    Pulses:       Dorsalis pedis pulses are 2+ on the right side, and 2+ on the left side.        Posterior tibial pulses are 2+ on the right side, and 2+ on the left side.   CFT< 3 secs all toes bilateral foot, skin temp warm bilateral foot, no digital hair growth bilateral foot, no lower extremity edema bilateral.       Musculoskeletal:        Right foot: There is no deformity.        Left foot: There is no deformity.   Mild pain plantar 2 MTP, - Lauchman test bilateral foot. Gastrocnemius equinus bilateral. Rectus foot type with adductovarus rotation of fifth toe bilateral. No pain with ROM or MMT bilateral foot.   Feet:   Right Foot:   Protective Sensation: 10 sites tested. 10 sites sensed.   Skin Integrity: Negative for ulcer, blister, skin breakdown, erythema, warmth, callus or dry skin.   Left Foot:   Protective Sensation: 10 sites tested. 10 sites sensed.   Skin Integrity: Negative for ulcer, blister, skin breakdown, erythema, warmth, callus or dry skin.   Neurological: She is alert and oriented to person, place, and time. She has normal  strength. No sensory deficit.   Vibratory sensation intact bilateral foot.       Skin: Skin is warm, dry and intact. Capillary refill takes less than 2 seconds. No ecchymosis and no rash noted. She is not diaphoretic. No cyanosis or erythema. No pallor. Nails show no clubbing.   Left hallux nail is grown out with moderately incurvated distal medial and lateral nail borders with localized pain and mild edema, no erythema.    Right hallux nail is thickened, elongated, yellow and dystrophic with incurvated medial and lateral nail borders and localized pain on palpation. It is also growing in superior direction noting hypertrophy of nail bed.    Remaining nails 2-5 b/l normotrophic and trimmed.     No open lesions or macerations bilateral lower extremity.                     Assessment:       Encounter Diagnoses   Name Primary?    Onychocryptosis Yes    Pincer nail deformity     Controlled type 2 diabetes mellitus without complication, with long-term current use of insulin          Plan:       Vivienne was seen today for ingrown toenail.    Diagnoses and all orders for this visit:    Onychocryptosis    Pincer nail deformity    Controlled type 2 diabetes mellitus without complication, with long-term current use of insulin    Other orders  -     silver sulfADIAZINE 1% (SILVADENE) 1 % cream; Apply topically 2 (two) times daily.      I counseled the patient on her conditions, their implications and medical management.    Shoe inspection. Diabetic Foot Education. Patient reminded of the importance of good nutrition and blood sugar control to help prevent podiatric complications of diabetes. Patient instructed on proper foot hygeine. We discussed wearing proper shoe gear, daily foot inspections, never walking without protective shoe gear, never putting sharp instruments to feet, routine podiatric nail visits every 2-3 months. Discussed shoes with adequate toe box space to prevent excessive pressure and rubbing along the  toenails.     Discussed treatment options for painful ingrown toenails in detail.    Procedure: Bilateral hallux total nail avulsion with phenol and acohol matrixectomy   Pathology: none  EBL: < 1 mL  Materials: none  Injectibles: 8 mL 0.25% plain marcaine  Complications: none    Procedure in detail: Time out called verifying patient, procedure and toe. Skin prepped with alcohol followed by ethyl chloride application and infiltration of local anesthetic described above into proximal toe. Skin was prepped with betadine. A sterile tourniquet was applied to the toe. Sterile instrumentation was used to excise the entire nail plate of the described toes above. Phenol was then applied with a cotton tip applicator for 30 second intervals x 3. Alcohol was used to dilute the phenol followed by irrigation with saline. The tourniquet was released noting instant return of the toe color and capillary fill time was instant. Bacitracin ointment was applied to the wound followed by gauze secured with coban. Home care instructions reviewed in detail.    The patient tolerated the procedure well without complication.    RTC 10 days or prn for wound check.

## 2017-12-06 ENCOUNTER — TELEPHONE (OUTPATIENT)
Dept: URGENT CARE | Facility: CLINIC | Age: 67
End: 2017-12-06

## 2017-12-06 DIAGNOSIS — J30.9 ALLERGIC RHINITIS: ICD-10-CM

## 2017-12-06 LAB
BACTERIA UR CULT: ABNORMAL
BACTERIA UR CULT: ABNORMAL
OTHER ANTIBIOTIC SUSC ISLT: ABNORMAL

## 2017-12-06 NOTE — TELEPHONE ENCOUNTER
----- Message from Medina Dyson MD sent at 12/6/2017  9:13 AM CST -----  Please call the patient regarding her abnormal result.  Urine culture did show bacteria that is sensitive to the antibiotic she was given,  Finish all antibiotic and recheck if she has any further symptoms with her PCP or here.

## 2017-12-06 NOTE — TELEPHONE ENCOUNTER
Called patient, informed patient of her abnormal urine culture result. Patient stated she is feeling better and will follow up with her PCP if her symptoms do not improve.

## 2017-12-07 RX ORDER — FLUTICASONE PROPIONATE 50 MCG
SPRAY, SUSPENSION (ML) NASAL
Qty: 32 G | Refills: 2 | Status: SHIPPED | OUTPATIENT
Start: 2017-12-07 | End: 2018-01-12 | Stop reason: SDUPTHER

## 2017-12-07 RX ORDER — AZELASTINE 1 MG/ML
SPRAY, METERED NASAL
Qty: 30 ML | Refills: 2 | Status: SHIPPED | OUTPATIENT
Start: 2017-12-07 | End: 2018-01-12 | Stop reason: SDUPTHER

## 2017-12-08 ENCOUNTER — CLINICAL SUPPORT (OUTPATIENT)
Dept: INTERNAL MEDICINE | Facility: CLINIC | Age: 67
End: 2017-12-08
Payer: MEDICARE

## 2017-12-08 ENCOUNTER — PATIENT OUTREACH (OUTPATIENT)
Dept: OTHER | Facility: OTHER | Age: 67
End: 2017-12-08

## 2017-12-08 DIAGNOSIS — I15.2 HYPERTENSION ASSOCIATED WITH DIABETES: Primary | ICD-10-CM

## 2017-12-08 DIAGNOSIS — E11.59 HYPERTENSION ASSOCIATED WITH DIABETES: Primary | ICD-10-CM

## 2017-12-08 DIAGNOSIS — J30.9 CHRONIC ALLERGIC RHINITIS, UNSPECIFIED SEASONALITY, UNSPECIFIED TRIGGER: ICD-10-CM

## 2017-12-08 PROCEDURE — 99499 UNLISTED E&M SERVICE: CPT | Mod: S$GLB,,, | Performed by: ALLERGY & IMMUNOLOGY

## 2017-12-08 PROCEDURE — 95115 IMMUNOTHERAPY ONE INJECTION: CPT | Mod: S$GLB,,, | Performed by: FAMILY MEDICINE

## 2017-12-08 RX ORDER — CHLORTHALIDONE 25 MG/1
TABLET ORAL
Qty: 30 TABLET | Refills: 3 | Status: SHIPPED | OUTPATIENT
Start: 2017-12-08 | End: 2018-01-15

## 2017-12-08 NOTE — PROGRESS NOTES
Patient here for allergy injection.    0.25mL given SQ of 1:11 Red in Upper Left arm.  Tolerated well.   Patient observed for 30 minutes.  0 reaction noted    Pt voiced no complaints..

## 2017-12-08 NOTE — PROGRESS NOTES
Last 5 Patient Entered Readings Current 30 Day Average: 140/70     Recent Readings 12/7/2017 12/5/2017 12/4/2017 12/3/2017 12/2/2017    Systolic BP (mmHg) 142 137 140 139 138    Diastolic BP (mmHg) 65 65 74 71 76    Pulse 70 66 64 78 68        Patient's BP average is not controlled. Reviewed 2017 ACC/AHA HTN guidelines with patient and explained BP goal is <130/80.     Ms. Jose's BP has improved, but based on new guideline update, goal is lower. She is reluctant to add new medications. Will change HCTZ to chlorthalidone and check BMP on 12/15/17.     Will continue to monitor regularly. Will follow up in 2-3 weeks, sooner if BP begins to trend upward or downward.    Patient has my contact information and knows to call with any concerns or clinical changes.     Current HTN regimen:  Hypertension Medications             amlodipine (NORVASC) 5 MG tablet TAKE 1 TABLET EVERY DAY    chlorthalidone (HYGROTEN) 25 MG Tab Take 1/2 tablet (12.5 mg) by mouth once daily.    valsartan (DIOVAN) 160 MG tablet TAKE 1 TABLET TWICE DAILY

## 2017-12-10 RX ORDER — LIRAGLUTIDE 6 MG/ML
INJECTION SUBCUTANEOUS
Qty: 27 ML | Refills: 11 | Status: SHIPPED | OUTPATIENT
Start: 2017-12-10 | End: 2017-12-27 | Stop reason: SDUPTHER

## 2017-12-11 ENCOUNTER — OFFICE VISIT (OUTPATIENT)
Dept: PODIATRY | Facility: CLINIC | Age: 67
End: 2017-12-11
Payer: MEDICARE

## 2017-12-11 ENCOUNTER — CLINICAL SUPPORT (OUTPATIENT)
Dept: INTERNAL MEDICINE | Facility: CLINIC | Age: 67
End: 2017-12-11
Payer: MEDICARE

## 2017-12-11 VITALS
DIASTOLIC BLOOD PRESSURE: 63 MMHG | HEIGHT: 62 IN | SYSTOLIC BLOOD PRESSURE: 133 MMHG | HEART RATE: 64 BPM | WEIGHT: 250 LBS | BODY MASS INDEX: 46.01 KG/M2

## 2017-12-11 DIAGNOSIS — Z98.890 POSTOPERATIVE STATE: Primary | ICD-10-CM

## 2017-12-11 DIAGNOSIS — J30.9 CHRONIC ALLERGIC RHINITIS, UNSPECIFIED SEASONALITY, UNSPECIFIED TRIGGER: ICD-10-CM

## 2017-12-11 DIAGNOSIS — E11.9 CONTROLLED TYPE 2 DIABETES MELLITUS WITHOUT COMPLICATION, WITH LONG-TERM CURRENT USE OF INSULIN: ICD-10-CM

## 2017-12-11 DIAGNOSIS — Z79.4 CONTROLLED TYPE 2 DIABETES MELLITUS WITHOUT COMPLICATION, WITH LONG-TERM CURRENT USE OF INSULIN: ICD-10-CM

## 2017-12-11 DIAGNOSIS — E11.42 DIABETIC POLYNEUROPATHY ASSOCIATED WITH TYPE 2 DIABETES MELLITUS: ICD-10-CM

## 2017-12-11 PROCEDURE — 99499 UNLISTED E&M SERVICE: CPT | Mod: S$GLB,,, | Performed by: ALLERGY & IMMUNOLOGY

## 2017-12-11 PROCEDURE — 95115 IMMUNOTHERAPY ONE INJECTION: CPT | Mod: S$GLB,,, | Performed by: FAMILY MEDICINE

## 2017-12-11 PROCEDURE — 99499 UNLISTED E&M SERVICE: CPT | Mod: S$GLB,,, | Performed by: PODIATRIST

## 2017-12-11 PROCEDURE — 99024 POSTOP FOLLOW-UP VISIT: CPT | Mod: S$GLB,,, | Performed by: PODIATRIST

## 2017-12-11 PROCEDURE — 99999 PR PBB SHADOW E&M-EST. PATIENT-LVL IV: CPT | Mod: PBBFAC,,, | Performed by: PODIATRIST

## 2017-12-11 RX ORDER — GABAPENTIN 300 MG/1
300 CAPSULE ORAL NIGHTLY
Qty: 90 CAPSULE | Refills: 0 | Status: SHIPPED | OUTPATIENT
Start: 2017-12-11 | End: 2018-02-26 | Stop reason: SDUPTHER

## 2017-12-11 NOTE — PROGRESS NOTES
Patient here for allergy injection.    0.3mL given SQ of 1:11 Red in Upper Left arm.  Tolerated well.     After 30 minutes of observation, patient noted to have a 2+ reaction to injection.  States it itches slightly.

## 2017-12-12 ENCOUNTER — OFFICE VISIT (OUTPATIENT)
Dept: CARDIOLOGY | Facility: CLINIC | Age: 67
End: 2017-12-12
Payer: MEDICARE

## 2017-12-12 VITALS
OXYGEN SATURATION: 99 % | SYSTOLIC BLOOD PRESSURE: 133 MMHG | HEIGHT: 62 IN | DIASTOLIC BLOOD PRESSURE: 74 MMHG | HEART RATE: 65 BPM | BODY MASS INDEX: 45.2 KG/M2 | WEIGHT: 245.63 LBS

## 2017-12-12 DIAGNOSIS — E78.2 MIXED HYPERLIPIDEMIA: ICD-10-CM

## 2017-12-12 DIAGNOSIS — E11.69 DYSLIPIDEMIA ASSOCIATED WITH TYPE 2 DIABETES MELLITUS: ICD-10-CM

## 2017-12-12 DIAGNOSIS — G47.33 OSA ON CPAP: ICD-10-CM

## 2017-12-12 DIAGNOSIS — E78.5 DYSLIPIDEMIA ASSOCIATED WITH TYPE 2 DIABETES MELLITUS: ICD-10-CM

## 2017-12-12 DIAGNOSIS — I25.10 CORONARY ARTERY DISEASE INVOLVING NATIVE CORONARY ARTERY OF NATIVE HEART WITHOUT ANGINA PECTORIS: Primary | ICD-10-CM

## 2017-12-12 DIAGNOSIS — E66.01 MORBID OBESITY WITH BMI OF 45.0-49.9, ADULT: ICD-10-CM

## 2017-12-12 DIAGNOSIS — I10 ESSENTIAL HYPERTENSION: ICD-10-CM

## 2017-12-12 PROCEDURE — 99499 UNLISTED E&M SERVICE: CPT | Mod: S$GLB,,, | Performed by: INTERNAL MEDICINE

## 2017-12-12 PROCEDURE — 99214 OFFICE O/P EST MOD 30 MIN: CPT | Mod: S$GLB,,, | Performed by: INTERNAL MEDICINE

## 2017-12-12 PROCEDURE — 99999 PR PBB SHADOW E&M-EST. PATIENT-LVL III: CPT | Mod: PBBFAC,,, | Performed by: INTERNAL MEDICINE

## 2017-12-12 NOTE — PROGRESS NOTES
"Subjective:   Patient ID:  Vivienne Jose is a 67 y.o. female who presents for follow-up of Follow-up      Problem List Items Addressed This Visit        Cardiac/Vascular    HTN (hypertension)    CAD (coronary artery disease) - Primary    Dyslipidemia associated with type 2 diabetes mellitus    Hyperlipidemia       Endocrine    Morbid obesity with BMI of 45.0-49.9, adult       Other    BAM on CPAP          HPI: Patient doing well on f/u. No acute concerns. No further chest pain. PET stress negative for ischemia. She continues to have BRIONES. Weight is stable. She is not trying to lose weight. No orthopnea or PND. BP is controlled. She is compliant with medications.     Review of Systems   Constitution: Negative.   HENT: Negative.    Eyes: Negative.    Cardiovascular: Negative.    Respiratory: Negative.    Endocrine: Negative.    Hematologic/Lymphatic: Negative.    Skin: Negative.    Musculoskeletal: Negative.    Gastrointestinal: Negative.    Neurological: Negative.      Patient's Medications   New Prescriptions    No medications on file   Previous Medications    AMLODIPINE (NORVASC) 5 MG TABLET    TAKE 1 TABLET EVERY DAY    ASPIRIN (ECOTRIN) 81 MG EC TABLET    Take 81 mg by mouth once daily.    AZELASTINE (ASTELIN) 137 MCG (0.1 %) NASAL SPRAY    USE 1 SPRAY NASALLY EVERY DAY AS NEEDED  FOR  RHINITIS    BD ULTRA-FINE EDUARDO PEN NEEDLES 32 GAUGE X 5/32" NDLE    USE WITH VICTOZA AND LEVEMIR (2 INJECTIONS EVERY DAY)    BIOTIN 10,000 MCG CAP    Take by mouth.    BLOOD-GLUCOSE METER MISC    [employer] True Metrix Air meter    CALCIUM CARBONATE-VITAMIN D3 600 MG(1,500MG) -100 UNIT CAP    Take 1 tablet by mouth once daily.    CHLORTHALIDONE (HYGROTEN) 25 MG TAB    Take 1/2 tablet (12.5 mg) by mouth once daily.    CONJUGATED ESTROGENS (PREMARIN) VAGINAL CREAM    Place 0.5 g vaginally 3 (three) times a week.    DICLOFENAC SODIUM (VOLTAREN) 1 % GEL    Apply 2 g topically 4 (four) times daily.    ECONAZOLE NITRATE 1 % CREAM        " FLUTICASONE (FLONASE) 50 MCG/ACTUATION NASAL SPRAY    USE 1 SPRAY IN EACH NOSTRIL DAILY AS NEEDED FOR RHINITIS.    GABAPENTIN (NEURONTIN) 300 MG CAPSULE    Take 1 capsule (300 mg total) by mouth every evening.    GLIMEPIRIDE (AMARYL) 4 MG TABLET    Take 1 tablet (4 mg total) by mouth daily with breakfast.    L.ACID-B.BIFIDUM-B.ANIMAL-FOS (PROBIOTIC COMPLEX) 25 BILLION CELL -100 MG CAP    Take 1 capsule by mouth once daily.    LEVEMIR FLEXTOUCH 100 UNIT/ML (3 ML) INPN PEN    INJECT  14  UNITS SUBCUTANEOUSLY EVERY EVENING    LEVOTHYROXINE (SYNTHROID) 75 MCG TABLET    TAKE 1 TABLET ONE TIME DAILY    LORATADINE (CLARITIN) 10 MG TABLET    Take 10 mg by mouth once daily.    LOVASTATIN (MEVACOR) 40 MG TABLET    TAKE 1 TABLET  NIGHTLY    MAGNESIUM OXIDE-MG AA CHELATE (MAGNESIUM, AMINO ACID CHELATE,) 133 MG TAB    Take by mouth as directed.     METFORMIN (GLUCOPHAGE) 1000 MG TABLET    TAKE 1 TABLET TWICE DAILY WITH MEALS    NITROFURANTOIN (MACRODANTIN) 100 MG CAPSULE    Take 1 capsule (100 mg total) by mouth every 12 (twelve) hours.    OMEPRAZOLE (PRILOSEC) 20 MG CAPSULE        SILVER SULFADIAZINE 1% (SILVADENE) 1 % CREAM    Apply topically 2 (two) times daily.    TRUE METRIX GLUCOSE TEST STRIP STRP    TEST TWO TIMES DAILY    TRUEPLUS LANCETS 28 GAUGE MISC    TEST TWO TIMES DAILY    UNABLE TO FIND    Zatador eye drops for allergies    VALACYCLOVIR (VALTREX) 500 MG TABLET    TAKE 1 TABLET EVERY DAY    VALSARTAN (DIOVAN) 160 MG TABLET    TAKE 1 TABLET TWICE DAILY    VICTOZA 3-TAWANA 0.6 MG/0.1 ML (18 MG/3 ML) PNIJ    INJECT 1.8 MG INTO THE SKIN ONCE DAILY.   Modified Medications    No medications on file   Discontinued Medications    No medications on file       Objective:   Physical Exam   Constitutional: She is oriented to person, place, and time. She appears well-developed and well-nourished. No distress.   Examination of the digits showed no clubbing or cyanosis   HENT:   Head: Normocephalic and atraumatic.   Eyes:  Conjunctivae are normal. Pupils are equal, round, and reactive to light. Right eye exhibits no discharge.   Neck: Normal range of motion. Neck supple. No JVD present. No thyromegaly present.   No carotid bruits   Cardiovascular: Normal rate, regular rhythm, S1 normal, S2 normal, normal heart sounds, intact distal pulses and normal pulses.  PMI is not displaced.  Exam reveals no gallop, no friction rub and no opening snap.    No murmur heard.  Pulmonary/Chest: Effort normal and breath sounds normal. No respiratory distress. She has no wheezes. She has no rales. She exhibits no tenderness.   Abdominal: Soft. Bowel sounds are normal. She exhibits no distension and no mass. There is no tenderness. There is no guarding.   No hepatosplenomegaly   Musculoskeletal: Normal range of motion. She exhibits no edema or tenderness.   Trace edema   Lymphadenopathy:     She has no cervical adenopathy.   Neurological: She is alert and oriented to person, place, and time.   Skin: Skin is warm. No rash noted. She is not diaphoretic. No erythema.   Psychiatric: She has a normal mood and affect.   Nursing note and vitals reviewed.      ECGs reviewed-NSR with RBBB  LABS reviewed  Imaging including Echoes reviewed-60% with no DD    Assessment:     1. Coronary artery disease involving native coronary artery of native heart without angina pectoris    2. Essential hypertension    3. Dyslipidemia associated with type 2 diabetes mellitus    4. Mixed hyperlipidemia    5. BAM on CPAP    6. Morbid obesity with BMI of 45.0-49.9, adult        Plan:       Continue current medications  Low salt diet  Weight loss. Dietary changes discussed with patient, as explained to patient with normal echo and nuclear stress dyspnea is likely secondary to weight.   Increase activity as tolerated  F/u in 6 months.

## 2017-12-13 NOTE — PROGRESS NOTES
Subjective:      Patient ID: Vivienne Jose is a 67 y.o. female.    Chief Complaint: Post-op Evaluation (P&A)    Vivienne is a 67 y.o. female who presents to the clinic for evaluation and treatment of high risk feet.  Vivienne has a past medical history of Allergy; Angio-edema; Arthritis; Cataract; Cerebrovascular malformation; Colon polyps; Coronary artery disease; Diabetes mellitus, type 2; Diabetic peripheral neuropathy; GERD (gastroesophageal reflux disease); Herpes infection; Hyperlipidemia; Hypertension; Hypothyroidism; Kidney stones; Nausea & vomiting (11/23/2015); Obesity, morbid; Ovarian cyst; Pyelonephritis, chronic; Seizure disorder, focal motor; Sleep apnea; Type II or unspecified type diabetes mellitus with neurological manifestations, uncontrolled(250.62); Urinary tract infection; Urticaria; and Vaginal infection.  This patient has documented high risk feet requiring routine maintenance secondary to diabetes mellitis and those secondary complications of diabetes, as mentioned.  Complains of redness, pain and swelling left big toe present for past several days. Denies trauma. Says she saw puss come out the distal tip of the toe as she was trimming the nail back.     9/27/16: Follow up from nail avulsion left hallux. Wound culture grew skin geronimo. Applied gentamicin for a few days but stopped due to oozing. Relates only slight pain when she touches it.     11/17/17: Returns to discuss further treatment of her painful big toenails. Accompanied by her .    12/1/17: Returns for bilateral hallux P&A matrixectomy. No new complaints. Accompanied by her .    12/11/17: Post P&A total nail matrixectomy bilateral. Relates mild discomfort. Applying silvadene cream twice a day.    PCP: Jay Jay Felton MD    Date Last Seen by PCP: 9/14/17  Current shoe gear:  Extra depth shoes    Hemoglobin A1C   Date Value Ref Range Status   09/08/2017 7.1 (H) 4.0 - 5.6 % Final     Comment:     According to ADA guidelines,  hemoglobin A1c <7.0% represents  optimal control in non-pregnant diabetic patients. Different  metrics may apply to specific patient populations.   Standards of Medical Care in Diabetes-2016.  For the purpose of screening for the presence of diabetes:  <5.7%     Consistent with the absence of diabetes  5.7-6.4%  Consistent with increasing risk for diabetes   (prediabetes)  >or=6.5%  Consistent with diabetes  Currently, no consensus exists for use of hemoglobin A1c  for diagnosis of diabetes for children.  This Hemoglobin A1c assay has significant interference with fetal   hemoglobin   (HbF). The results are invalid for patients with abnormal amounts of   HbF,   including those with known Hereditary Persistence   of Fetal Hemoglobin. Heterozygous hemoglobin variants (HbAS, HbAC,   HbAD, HbAE, HbA2) do not significantly interfere with this assay;   however, presence of multiple variants in a sample may impact the %   interference.     04/12/2017 6.9 (H) 4.5 - 6.2 % Final     Comment:     According to ADA guidelines, hemoglobin A1C <7.0% represents  optimal control in non-pregnant diabetic patients.  Different  metrics may apply to specific populations.   Standards of Medical Care in Diabetes - 2016.  For the purpose of screening for the presence of diabetes:  <5.7%     Consistent with the absence of diabetes  5.7-6.4%  Consistent with increasing risk for diabetes   (prediabetes)  >or=6.5%  Consistent with diabetes  Currently no consensus exists for use of hemoglobin A1C  for diagnosis of diabetes for children.     12/01/2016 7.0 (H) 4.5 - 6.2 % Final     Comment:     According to ADA guidelines, hemoglobin A1C <7.0% represents  optimal control in non-pregnant diabetic patients.  Different  metrics may apply to specific populations.   Standards of Medical Care in Diabetes - 2016.  For the purpose of screening for the presence of diabetes:  <5.7%     Consistent with the absence of diabetes  5.7-6.4%  Consistent with  "increasing risk for diabetes   (prediabetes)  >or=6.5%  Consistent with diabetes  Currently no consensus exists for use of hemoglobin A1C  for diagnosis of diabetes for children.       Vitals:    12/11/17 0806   BP: 133/63   Pulse: 64   Weight: 113.4 kg (250 lb)   Height: 5' 2" (1.575 m)   PainSc: 0-No pain      Past Medical History:   Diagnosis Date    Allergy     Angio-edema     Arthritis     Cataract     Cerebrovascular malformation     Colon polyps     Coronary artery disease     Diabetes mellitus, type 2     Diabetic peripheral neuropathy     GERD (gastroesophageal reflux disease)     Herpes infection     Hyperlipidemia     Hypertension     Hypothyroidism     Kidney stones     Nausea & vomiting 11/23/2015    Obesity, morbid     Ovarian cyst     Pyelonephritis, chronic     Seizure disorder, focal motor     Sleep apnea     Type II or unspecified type diabetes mellitus with neurological manifestations, uncontrolled(250.62)     Urinary tract infection     Urticaria     Vaginal infection        Past Surgical History:   Procedure Laterality Date    CARPAL TUNNEL RELEASE Right 2014    COLONOSCOPY      COLONOSCOPY N/A 9/13/2017    Procedure: COLONOSCOPY Golytely;  Surgeon: Gisela Wall MD;  Location: [facility];  Service: Endoscopy;  Laterality: N/A;    CYST REMOVAL      EXTRACORPOREAL SHOCK WAVE LITHOTRIPSY  2002    HYSTERECTOMY  1984    THYROIDECTOMY  1977         TONSILLECTOMY, ADENOIDECTOMY      trigger finder       TUBAL LIGATION         Family History   Problem Relation Age of Onset    Cancer Father     Allergies Son     Skin cancer Mother     Macular degeneration Mother     Dementia Mother     No Known Problems Daughter     No Known Problems Daughter     No Known Problems Daughter     Glaucoma Maternal Aunt      Great Maternal Aunt    Leukemia Maternal Uncle     Amblyopia Neg Hx     Cataracts Neg Hx     Retinal detachment Neg Hx     Strabismus Neg Hx     " "Diabetes Neg Hx     Hypertension Neg Hx     Stroke Neg Hx     Thyroid disease Neg Hx     Kidney disease Neg Hx        Social History     Social History    Marital status:      Spouse name: N/A    Number of children: N/A    Years of education: N/A     Occupational History    retired [employer]     Social History Main Topics    Smoking status: Never Smoker    Smokeless tobacco: Never Used    Alcohol use No    Drug use: No    Sexual activity: Not Currently     Other Topics Concern    None     Social History Narrative    None       Current Outpatient Prescriptions   Medication Sig Dispense Refill    amlodipine (NORVASC) 5 MG tablet TAKE 1 TABLET EVERY DAY 90 tablet 3    aspirin (ECOTRIN) 81 MG EC tablet Take 81 mg by mouth once daily.      azelastine (ASTELIN) 137 mcg (0.1 %) nasal spray USE 1 SPRAY NASALLY EVERY DAY AS NEEDED  FOR  RHINITIS 30 mL 2    BD ULTRA-FINE EDUARDO PEN NEEDLES 32 gauge x 5/32" Ndle USE WITH VICTOZA AND LEVEMIR (2 INJECTIONS EVERY DAY) 100 each 3    biotin 10,000 mcg Cap Take by mouth.      blood-glucose meter Misc [employer] True Metrix Air meter 1 each 0    calcium carbonate-vitamin D3 600 mg(1,500mg) -100 unit Cap Take 1 tablet by mouth once daily.      chlorthalidone (HYGROTEN) 25 MG Tab Take 1/2 tablet (12.5 mg) by mouth once daily. 30 tablet 3    econazole nitrate 1 % cream       fluticasone (FLONASE) 50 mcg/actuation nasal spray USE 1 SPRAY IN EACH NOSTRIL DAILY AS NEEDED FOR RHINITIS. 32 g 2    glimepiride (AMARYL) 4 MG tablet Take 1 tablet (4 mg total) by mouth daily with breakfast. 90 tablet 3    L.acid-B.bifidum-B.animal-FOS (PROBIOTIC COMPLEX) 25 billion cell -100 mg Cap Take 1 capsule by mouth once daily.      LEVEMIR FLEXTOUCH 100 unit/mL (3 mL) InPn pen INJECT  14  UNITS SUBCUTANEOUSLY EVERY EVENING 15 mL 1    levothyroxine (SYNTHROID) 75 MCG tablet TAKE 1 TABLET ONE TIME DAILY 90 tablet 1    loratadine (CLARITIN) 10 mg tablet Take 10 mg by " mouth once daily.      lovastatin (MEVACOR) 40 MG tablet TAKE 1 TABLET  NIGHTLY 90 tablet 0    magnesium oxide-Mg AA chelate (MAGNESIUM, AMINO ACID CHELATE,) 133 mg Tab Take by mouth as directed.       metformin (GLUCOPHAGE) 1000 MG tablet TAKE 1 TABLET TWICE DAILY WITH MEALS 180 tablet 3    nitrofurantoin (MACRODANTIN) 100 MG capsule Take 1 capsule (100 mg total) by mouth every 12 (twelve) hours. 20 capsule 0    omeprazole (PRILOSEC) 20 MG capsule       silver sulfADIAZINE 1% (SILVADENE) 1 % cream Apply topically 2 (two) times daily. 25 g 1    TRUE METRIX GLUCOSE TEST STRIP Strp TEST TWO TIMES DAILY 200 strip 6    TRUEPLUS LANCETS 28 gauge Misc TEST TWO TIMES DAILY 200 each 6    UNABLE TO FIND Zatador eye drops for allergies      valacyclovir (VALTREX) 500 MG tablet TAKE 1 TABLET EVERY DAY 90 tablet 1    valsartan (DIOVAN) 160 MG tablet TAKE 1 TABLET TWICE DAILY 180 tablet 1    VICTOZA 3-TAWANA 0.6 mg/0.1 mL (18 mg/3 mL) PnIj INJECT 1.8 MG INTO THE SKIN ONCE DAILY. 27 mL 11    conjugated estrogens (PREMARIN) vaginal cream Place 0.5 g vaginally 3 (three) times a week. 30 g 3    diclofenac sodium (VOLTAREN) 1 % Gel Apply 2 g topically 4 (four) times daily. 3 Tube 3    gabapentin (NEURONTIN) 300 MG capsule Take 1 capsule (300 mg total) by mouth every evening. 90 capsule 0     Current Facility-Administered Medications   Medication Dose Route Frequency Provider Last Rate Last Dose    Allergy Mix   Subcutaneous 1 time in Clinic/HOD Ailin Mills MD        Allergy Mix   Subcutaneous 1 time in Clinic/HOD Ailin Mills MD           Allergies   Allergen Reactions    Sulfa (Sulfonamide Antibiotics) Nausea Only    Ciprofloxacin     Januvia [Sitagliptin]      abd pain    Lisinopril     Lotensin [Benazepril]     Nexium [Esomeprazole Magnesium] Other (See Comments)     Gas         Review of Systems   Constitution: Negative for chills, fever, weakness and malaise/fatigue.   Cardiovascular: Negative  for chest pain, claudication and leg swelling.   Respiratory: Negative for cough and shortness of breath.    Skin: Positive for nail changes. Negative for color change, dry skin and itching.   Musculoskeletal: Positive for back pain. Negative for joint pain, muscle cramps and muscle weakness.   Gastrointestinal: Negative for nausea and vomiting.   Neurological: Positive for numbness and paresthesias.   Psychiatric/Behavioral: Negative for altered mental status.           Objective:      Physical Exam   Constitutional: She is oriented to person, place, and time. She appears well-nourished. No distress.   Cardiovascular: Intact distal pulses.    Pulses:       Dorsalis pedis pulses are 2+ on the right side, and 2+ on the left side.        Posterior tibial pulses are 2+ on the right side, and 2+ on the left side.   CFT< 3 secs all toes bilateral foot, skin temp warm bilateral foot, no digital hair growth bilateral foot, no lower extremity edema bilateral.       Musculoskeletal:        Right foot: There is no deformity.        Left foot: There is no deformity.   Mild pain plantar 2 MTP, - Lauchman test bilateral foot. Gastrocnemius equinus bilateral. Rectus foot type with adductovarus rotation of fifth toe bilateral. No pain with ROM or MMT bilateral foot.   Feet:   Right Foot:   Protective Sensation: 10 sites tested. 10 sites sensed.   Skin Integrity: Negative for ulcer, blister, skin breakdown, erythema, warmth, callus or dry skin.   Left Foot:   Protective Sensation: 10 sites tested. 10 sites sensed.   Skin Integrity: Negative for ulcer, blister, skin breakdown, erythema, warmth, callus or dry skin.   Neurological: She is alert and oriented to person, place, and time. She has normal strength. No sensory deficit.   Vibratory sensation intact bilateral foot.       Skin: Skin is warm and dry. Capillary refill takes less than 2 seconds. No ecchymosis and no rash noted. She is not diaphoretic. No cyanosis or erythema. No  pallor. Nails show no clubbing.   Bilateral hallux nail bed with granular wound containing moderate overlying biofilm, mild surrounding edema and slight erythema, no active drainage, no odor.     Remaining nails 2-5 b/l normotrophic and trimmed.     No open lesions or macerations bilateral lower extremity.                     Assessment:       Encounter Diagnoses   Name Primary?    Postoperative state Yes    Controlled type 2 diabetes mellitus without complication, with long-term current use of insulin          Plan:       Vivienne was seen today for post-op evaluation.    Diagnoses and all orders for this visit:    Postoperative state    Controlled type 2 diabetes mellitus without complication, with long-term current use of insulin      I counseled the patient on her conditions, their implications and medical management.    Shoe inspection. Diabetic Foot Education. Patient reminded of the importance of good nutrition and blood sugar control to help prevent podiatric complications of diabetes. Patient instructed on proper foot hygeine. We discussed wearing proper shoe gear, daily foot inspections, never walking without protective shoe gear, never putting sharp instruments to feet, routine podiatric nail visits every 2-3 months. Discussed shoes with adequate toe box space to prevent excessive pressure and rubbing along the toenails.     With the patient's consent a sterile curette was used to excise biofilm tissue bilateral hallux wound site. Irrigated with NS. Applied triple antibiotic ointment and band aid. Reviewed continued wound care instructions for 6-8 weeks.

## 2017-12-15 ENCOUNTER — CLINICAL SUPPORT (OUTPATIENT)
Dept: INTERNAL MEDICINE | Facility: CLINIC | Age: 67
End: 2017-12-15
Payer: MEDICARE

## 2017-12-15 ENCOUNTER — LAB VISIT (OUTPATIENT)
Dept: LAB | Facility: HOSPITAL | Age: 67
End: 2017-12-15
Attending: INTERNAL MEDICINE
Payer: MEDICARE

## 2017-12-15 DIAGNOSIS — E11.59 HYPERTENSION ASSOCIATED WITH DIABETES: ICD-10-CM

## 2017-12-15 DIAGNOSIS — J30.9 CHRONIC ALLERGIC RHINITIS, UNSPECIFIED SEASONALITY, UNSPECIFIED TRIGGER: ICD-10-CM

## 2017-12-15 DIAGNOSIS — I15.2 HYPERTENSION ASSOCIATED WITH DIABETES: ICD-10-CM

## 2017-12-15 LAB
ANION GAP SERPL CALC-SCNC: 7 MMOL/L
BUN SERPL-MCNC: 15 MG/DL
CALCIUM SERPL-MCNC: 9.1 MG/DL
CHLORIDE SERPL-SCNC: 101 MMOL/L
CO2 SERPL-SCNC: 32 MMOL/L
CREAT SERPL-MCNC: 0.9 MG/DL
EST. GFR  (AFRICAN AMERICAN): >60 ML/MIN/1.73 M^2
EST. GFR  (NON AFRICAN AMERICAN): >60 ML/MIN/1.73 M^2
GLUCOSE SERPL-MCNC: 170 MG/DL
POTASSIUM SERPL-SCNC: 3.7 MMOL/L
SODIUM SERPL-SCNC: 140 MMOL/L

## 2017-12-15 PROCEDURE — 95115 IMMUNOTHERAPY ONE INJECTION: CPT | Mod: S$GLB,,, | Performed by: FAMILY MEDICINE

## 2017-12-15 PROCEDURE — 36415 COLL VENOUS BLD VENIPUNCTURE: CPT | Mod: PO

## 2017-12-15 PROCEDURE — 99499 UNLISTED E&M SERVICE: CPT | Mod: S$GLB,,, | Performed by: ALLERGY & IMMUNOLOGY

## 2017-12-15 PROCEDURE — 80048 BASIC METABOLIC PNL TOTAL CA: CPT

## 2017-12-15 NOTE — PROGRESS NOTES
Patient here for allergy injection.    0.35mL given SQ of 1:11 Red in Upper Left arm.  Tolerated well.     After 30 minutes of observation, patient noted to have a 2+ reaction to injection.  States it itches slightly.

## 2017-12-18 ENCOUNTER — TELEPHONE (OUTPATIENT)
Dept: GASTROENTEROLOGY | Facility: CLINIC | Age: 67
End: 2017-12-18

## 2017-12-18 ENCOUNTER — CLINICAL SUPPORT (OUTPATIENT)
Dept: INTERNAL MEDICINE | Facility: CLINIC | Age: 67
End: 2017-12-18
Payer: MEDICARE

## 2017-12-18 ENCOUNTER — OFFICE VISIT (OUTPATIENT)
Dept: NEPHROLOGY | Facility: CLINIC | Age: 67
End: 2017-12-18
Payer: MEDICARE

## 2017-12-18 VITALS
HEIGHT: 62 IN | OXYGEN SATURATION: 97 % | WEIGHT: 248.69 LBS | HEART RATE: 74 BPM | SYSTOLIC BLOOD PRESSURE: 140 MMHG | BODY MASS INDEX: 45.76 KG/M2 | DIASTOLIC BLOOD PRESSURE: 68 MMHG

## 2017-12-18 DIAGNOSIS — J30.9 CHRONIC ALLERGIC RHINITIS, UNSPECIFIED SEASONALITY, UNSPECIFIED TRIGGER: ICD-10-CM

## 2017-12-18 DIAGNOSIS — N20.0 KIDNEY STONES: Primary | ICD-10-CM

## 2017-12-18 PROCEDURE — 99999 PR PBB SHADOW E&M-EST. PATIENT-LVL II: CPT | Mod: PBBFAC,,, | Performed by: INTERNAL MEDICINE

## 2017-12-18 PROCEDURE — 95115 IMMUNOTHERAPY ONE INJECTION: CPT | Mod: S$GLB,,, | Performed by: FAMILY MEDICINE

## 2017-12-18 PROCEDURE — 99499 UNLISTED E&M SERVICE: CPT | Mod: S$GLB,,, | Performed by: INTERNAL MEDICINE

## 2017-12-18 PROCEDURE — 99214 OFFICE O/P EST MOD 30 MIN: CPT | Mod: S$GLB,,, | Performed by: INTERNAL MEDICINE

## 2017-12-18 PROCEDURE — 99499 UNLISTED E&M SERVICE: CPT | Mod: S$GLB,,, | Performed by: ALLERGY & IMMUNOLOGY

## 2017-12-18 NOTE — PROGRESS NOTES
Subjective:       Patient ID: Vivienne Jose is a 67 y.o. White female who presents for follow-up evaluation of No chief complaint on file.    HPI This is a 67-year-old white female with history of diabetes, hypertension, nephrolithiasis, and obstructive sleep apnea is coming in for f/u of kidney stones. No recent stones. She states her blood pressures are stable in the 130's-140's/70's-80's and occ 150's.  Diabetes labile. Creatinine stable.  Removed kidney stone in December 2016 with several UTI's -sees urology.  Recently finished treated for UTI.     PAST MEDICAL HISTORY: Significant for diabetes, hypertension, obstructive   sleep apnea, hyperlipidemia, frequent UTIs,  lithotripsy, and CAD.     SOCIAL HISTORY: Does not smoke and does not drink. She works as an    at [employer].     FAMILY HISTORY: No family history of kidney stones. Her daughter has   frequent UTIs secondary to reflux.     Review of Systems   Constitutional: Negative for fatigue.   Eyes: Negative for discharge.   Respiratory: Positive for shortness of breath. Negative for cough and wheezing.    Cardiovascular: Negative for chest pain and palpitations.   Gastrointestinal: Negative for abdominal pain, diarrhea, nausea and vomiting.   Genitourinary: Negative for dysuria, frequency, hematuria and urgency.   Skin: Negative for color change and rash.   Psychiatric/Behavioral: Negative for confusion.   All other systems reviewed and are negative.      Objective:      Physical Exam   Constitutional: She is oriented to person, place, and time. She appears well-developed and well-nourished.   HENT:   Right Ear: External ear normal.   Left Ear: External ear normal.   Nose: Nose normal.   Mouth/Throat: Normal dentition.   Eyes: Conjunctivae, EOM and lids are normal. Pupils are equal, round, and reactive to light.   Neck: Trachea normal. No thyroid mass present.   Cardiovascular: Normal rate and regular rhythm.  Exam reveals no friction rub.     No murmur heard.  Pulmonary/Chest: Effort normal and breath sounds normal. No respiratory distress. She has no decreased breath sounds. She has no rales.   Abdominal: Soft. Bowel sounds are normal. She exhibits no mass. There is no tenderness. There is no rebound. No hernia.   Musculoskeletal: She exhibits edema.   Trace edema   Neurological: She is alert and oriented to person, place, and time.   Skin: Skin is warm and dry. No rash noted. No erythema.   Psychiatric: She has a normal mood and affect. Judgment normal. Her mood appears not anxious. She does not exhibit a depressed mood.   Oriented to time, place and person.   Vitals reviewed.      Assessment:       No diagnosis found.    Plan:         This is a 67-year-old white female with a history of   diabetes, hypertension and kidney stones is here for f/u.   1. Nephrolithiasis. She has not had any kidney stones in the past 20   years except for a recent 6 mm stone which was lithotripsied as this was   believed to be the nidus for her frequent UTIs-2011 and one in dec 2016.  Recent urorirsk profile showed low volume-hydrate well. Not hydrating well.  Low magnesium-on magnesium 600 mg a day. Asked the pt to decrease  animal protein intake and to decraese salt and to decrease excessive calcium intake.  No stones after recent stone removal-feb 2017 US.  Increase water intake.   2. Hypertension. Blood pressure is higher and was recently switched from HCTZ to chlorthalidone last week. Con't to monitor.  Asked to follow low salt diet.   3. Renal: Her creatinines have been stable.   4. DM; No  proteinuria. Repeat. On ARB.  hbg a1c stable.  Increase potassium intake; no alkalosis or acidosis.  No evidence of hyperaldosteronism per labs.    Return in 9 months.

## 2017-12-18 NOTE — PROGRESS NOTES
Patient here for allergy injection.    0.4mL given SQ of 1:1 Red in Upper Left arm.  Tolerated well.     After 30 minutes of observation, patient noted to have a 1+ reaction to injection.  No complaints voiced.

## 2017-12-18 NOTE — TELEPHONE ENCOUNTER
Spoke with patient to inform her that she may disregard letter she received. Advised her that her next colonoscopy is not due until 5 years from now. Verbalized understanding.    ----- Message from Flora Clifford MA sent at 12/18/2017  8:17 AM CST -----  Patient states she received a letter stating she should schedule her colonoscopy but states she last had it on 9/13.

## 2017-12-22 ENCOUNTER — CLINICAL SUPPORT (OUTPATIENT)
Dept: INTERNAL MEDICINE | Facility: CLINIC | Age: 67
End: 2017-12-22
Payer: MEDICARE

## 2017-12-22 DIAGNOSIS — J30.9 CHRONIC ALLERGIC RHINITIS, UNSPECIFIED SEASONALITY, UNSPECIFIED TRIGGER: ICD-10-CM

## 2017-12-22 PROCEDURE — 95115 IMMUNOTHERAPY ONE INJECTION: CPT | Mod: S$GLB,,, | Performed by: FAMILY MEDICINE

## 2017-12-22 PROCEDURE — 99499 UNLISTED E&M SERVICE: CPT | Mod: S$GLB,,, | Performed by: ALLERGY & IMMUNOLOGY

## 2017-12-22 NOTE — PROGRESS NOTES
Patient here for allergy injection.    0.45mL given SQ of 1:1 Red in Upper Left arm.  Tolerated well.     After 30 minutes of observation, patient noted to have a 1+ reaction to injection.  No complaints voiced.

## 2017-12-26 NOTE — PROGRESS NOTES
Subjective:      Patient ID: Harvey King is a 67 y.o. female.    Chief Complaint:  Diabetes Mellitus and Hypothyroidism      History of Present Illness  Harvey King is presenting today for DM type 2 & hypothyroidism follow up. She was previously seen by Dr. Bullard last visit was 06/12/2017. This is her first visit with me.     With regards to the diabetes:  Diagnosed with DM in her early 50s   She was told she had prediabetes in her 30s     Current regimen:  Levemir 14 units PM  Victoza 1.8 mg qd  Metformin 1000 mg BID  Amaryl 4 mg daily     Missed doses? No     2 times a day testing  Log reviewed: yes     Pre breakfast , 192, 210  qhs 113-257    Eats 3 meals a day. Snacks-        Drinks    Exercise - going to try to go to yoga      Hypoglycemic event? None   Corrects with juice     Known complications PN and nephropathy  Last eye exam: No  05/2017   Last podiatry exam: Sees Dr. Ohara monthly Last visit 12/11/2017      Education - last visit: 05/2015     With regards to hypertension:  Tolerating ARB    With regards to dyslipidemia:  Tolerating statin   Results for HARVEY KING (MRN [id number]) as of 12/27/2017 09:45   Ref. Range 9/8/2017 07:45   Hemoglobin A1C Latest Ref Range: 4.0 - 5.6 % 7.1 (H)   Estimated Avg Glucose Latest Ref Range: 68 - 131 mg/dL 157 (H)       With regards to her hypothyroidism:     In 1978 she had thyroidectomy for goiter and multiple cysts     Current medication:  generic lt4 75 mcg     Does not take thyroid medication properly without food first thing in the morning.     current symptoms:   Denies weight gain  Denies Fatigue   Denies Constipation   Denies Hair loss  Denies Brittle nails  Denies Mental fog     Last BMD dated 09/29/2015   L spine: t score= -1.3    Left hip -1.9  Right hip: -1.6   10 year probability of fracture  Major osteoporotic fracture 22%  Hip fracture3.1%  Has BAM using cpap    Last thyroid US in 2015: Impression     Small right lobe from previous thyroid  surgery otherwise unremarkable.    RECOMMENDATION: Repeat ultrasound in 3 years.     Review of Systems   Constitutional: Negative for unexpected weight change.   Eyes: Negative for visual disturbance.   Respiratory: Negative for shortness of breath.    Cardiovascular: Negative for chest pain.   Gastrointestinal: Negative for abdominal pain.   Endocrine: Negative for cold intolerance, heat intolerance, polydipsia, polyphagia and polyuria.   Musculoskeletal: Negative for arthralgias.   Skin: Negative for wound.   Neurological: Negative for headaches.   Hematological: Does not bruise/bleed easily.   Psychiatric/Behavioral: Negative for sleep disturbance.       Objective:   Physical Exam   Neck: No thyromegaly present.   Cardiovascular: Normal rate.    No edema present   Pulmonary/Chest: Effort normal.   Abdominal: Soft.   Vitals reviewed.  Foot exam deferred done 12/11/2017  injection sites are ok. No lipo hypertropthy or atrophy    Body mass index is 45.28 kg/m².    Lab Review:   Lab Results   Component Value Date    HGBA1C 7.1 (H) 09/08/2017     Lab Results   Component Value Date    CHOL 153 09/08/2017    HDL 46 09/08/2017    LDLCALC 76.8 09/08/2017    TRIG 151 (H) 09/08/2017    CHOLHDL 30.1 09/08/2017     Lab Results   Component Value Date     12/15/2017    K 3.7 12/15/2017     12/15/2017    CO2 32 (H) 12/15/2017     (H) 12/15/2017    BUN 15 12/15/2017    CREATININE 0.9 12/15/2017    CALCIUM 9.1 12/15/2017    PROT 7.8 09/08/2017    ALBUMIN 3.1 (L) 09/08/2017    ALBUMIN 3.1 (L) 09/08/2017    BILITOT 0.4 09/08/2017    ALKPHOS 96 09/08/2017    AST 22 09/08/2017    ALT 21 09/08/2017    ANIONGAP 7 (L) 12/15/2017    ESTGFRAFRICA >60.0 12/15/2017    EGFRNONAA >60.0 12/15/2017    TSH 3.059 09/08/2017       Assessment and Plan     1. Type 2 diabetes mellitus without retinopathy  Vitamin D    Hemoglobin A1c    DXA Bone Density Spine And Hip    TSH    Vitamin B12    Hemoglobin A1c    TSH    Vitamin D   2.  "Diabetic polyneuropathy associated with type 2 diabetes mellitus  Vitamin D    Hemoglobin A1c    DXA Bone Density Spine And Hip    TSH    Vitamin B12    Hemoglobin A1c    TSH    Vitamin D   3. Post-surgical hypothyroidism  Vitamin D    Hemoglobin A1c    DXA Bone Density Spine And Hip    TSH    Vitamin B12    Hemoglobin A1c    TSH    Vitamin D   4. Vitamin D deficiency  Vitamin D    Hemoglobin A1c    DXA Bone Density Spine And Hip    TSH    Vitamin B12    Hemoglobin A1c    TSH    Vitamin D   5. BAM on CPAP  Vitamin D    Hemoglobin A1c    DXA Bone Density Spine And Hip    TSH    Vitamin B12    Hemoglobin A1c    TSH    Vitamin D   6. Hypertension associated with diabetes  Vitamin D    Hemoglobin A1c    DXA Bone Density Spine And Hip    TSH    Vitamin B12    Hemoglobin A1c    TSH    Vitamin D   7. Dyslipidemia associated with type 2 diabetes mellitus  Vitamin D    Hemoglobin A1c    DXA Bone Density Spine And Hip    TSH    Vitamin B12    Hemoglobin A1c    TSH    Vitamin D   8. Morbid obesity with BMI of 45.0-49.9, adult  Vitamin D    Hemoglobin A1c    DXA Bone Density Spine And Hip    TSH    Vitamin B12    Hemoglobin A1c    TSH    Vitamin D   9. Type 2 diabetes mellitus without complication, with long-term current use of insulin  Vitamin D    Hemoglobin A1c    DXA Bone Density Spine And Hip    TSH    Vitamin B12    metFORMIN (GLUCOPHAGE) 1000 MG tablet    blood sugar diagnostic (TRUE METRIX GLUCOSE TEST STRIP) Strp    Hemoglobin A1c    TSH    Vitamin D    lancets (TRUEPLUS LANCETS) 28 gauge Misc    DISCONTINUED: lancets (TRUEPLUS LANCETS) 28 gauge Misc   10. Uncontrolled type 2 diabetes mellitus with complication, with long-term current use of insulin  Vitamin D    Hemoglobin A1c    DXA Bone Density Spine And Hip    TSH    Vitamin B12    pen needle, diabetic (BD ULTRA-FINE EDUARDO PEN NEEDLES) 32 gauge x 5/32" Ndle    Hemoglobin A1c    TSH    Vitamin D   11. Hypothyroidism, unspecified type  Vitamin D    Hemoglobin A1c    " DXA Bone Density Spine And Hip    TSH    Vitamin B12    Hemoglobin A1c    TSH    Vitamin D    levothyroxine (SYNTHROID) 75 MCG tablet   12. Osteopenia, unspecified location     13. Other specified disorders of bone density and structure, multiple sites   DXA Bone Density Spine And Hip   14. Other long term (current) drug therapy   Vitamin B12       Type 2 diabetes mellitus without retinopathy  -Reviewed goals of therapy are to get the best control we can without hypoglycemia  - Discussed diet and exercise today   - Check B12 with next labs due to long term metformin use   Medication changes:   Stop: Amaryl   Start: Repaglinide 1 mg TID AC  Continue: Metformin, victoza, & levemir   - glucagon emergency kit ordered.  - A1c goal 7.5%. Last A1c at goal.    - Urine UTD.   - Reviewed patient's current insulin regimen. Clarified proper insulin dose and timing in relation to meals, etc. Insulin injection sites and proper rotation instructed.     -Advised frequent self blood glucose monitoring.  Patient encouraged to document glucose results and bring them to every clinic visit    -Hypoglycemia precautions discussed. Instructed on precautions before driving.    -Close adherence to lifestyle changes recommended.   -Periodic follow ups for eye evaluations, foot care and dental care suggested.        Diabetic polyneuropathy associated with type 2 diabetes mellitus  -- Continue gabapentin and following with podiatry.  -- Educated patient on proper comfortable foot wear, to always wear dry socks, never walk barefoot, never cut toenails too short, never test bath water with feet, encouraged patient to inspect feet daily for wounds., and if patient applies lotion to this feet to always go around the foot and not in between toes.     Hypothyroidism  - Clinically and biochemically euthyroid  - Continue lt4 75 mcg daily   - Goal is a normal TSH  - Check TFTs and adjust dosage accordingly   - Avoid exogenous hyperthyroidism as this can  accelerate bone loss and increase risk of CV complications.  - Advised to take LT4 on an empty stomach with water and to wait 30-45 minutes before eating or taking other medications   - Reviewed usual  times of thyroid hormone  changes- call if start/ stop ocps, weight changes, attempting conception and during pregnancy   - Reviewed that symptoms of hypothyroidism may not correlate with tsh, and a normal TSH is the goal of therapy.....  symptoms are not a justification for over treatment          Vitamin D deficiency  -since your vitamin D is low start over the counter 2000 IU a day         BAM on CPAP  - Continue CPA use     Hypertension associated with diabetes  - Tolerating ARB  - Patients /72 which is patients usual BP.    - Blood pressure goals discussed with patient      Dyslipidemia associated with type 2 diabetes mellitus  Controlled   On statin per ADA recommendations      Osteopenia  -- Start otc Vit D 2000 iu daily  -- Schedule BMD    Morbid obesity with BMI of 45.0-49.9, adult  -- encouraged dietary and lifestyle modifications   -- emphasized weight loss goals         Return in about 4 months (around 4/27/2018).  Labs on 01/10/2018 and then prior to 04/2018 appt    Case discussed with Dr. Erazo   Recommendations were discussed with the patient in detail  The patient verbalized understanding and agrees with the plan outlined as above.

## 2017-12-27 ENCOUNTER — OFFICE VISIT (OUTPATIENT)
Dept: ENDOCRINOLOGY | Facility: CLINIC | Age: 67
End: 2017-12-27
Payer: MEDICARE

## 2017-12-27 ENCOUNTER — PATIENT OUTREACH (OUTPATIENT)
Dept: OTHER | Facility: OTHER | Age: 67
End: 2017-12-27

## 2017-12-27 ENCOUNTER — CLINICAL SUPPORT (OUTPATIENT)
Dept: INTERNAL MEDICINE | Facility: CLINIC | Age: 67
End: 2017-12-27
Payer: MEDICARE

## 2017-12-27 VITALS
HEART RATE: 76 BPM | DIASTOLIC BLOOD PRESSURE: 72 MMHG | WEIGHT: 247.56 LBS | SYSTOLIC BLOOD PRESSURE: 146 MMHG | HEIGHT: 62 IN | BODY MASS INDEX: 45.56 KG/M2

## 2017-12-27 DIAGNOSIS — E11.9 TYPE 2 DIABETES MELLITUS WITHOUT COMPLICATION, WITH LONG-TERM CURRENT USE OF INSULIN: ICD-10-CM

## 2017-12-27 DIAGNOSIS — Z79.899 OTHER LONG TERM (CURRENT) DRUG THERAPY: ICD-10-CM

## 2017-12-27 DIAGNOSIS — E11.42 DIABETIC POLYNEUROPATHY ASSOCIATED WITH TYPE 2 DIABETES MELLITUS: ICD-10-CM

## 2017-12-27 DIAGNOSIS — I15.2 HYPERTENSION ASSOCIATED WITH DIABETES: ICD-10-CM

## 2017-12-27 DIAGNOSIS — E03.9 HYPOTHYROIDISM, UNSPECIFIED TYPE: ICD-10-CM

## 2017-12-27 DIAGNOSIS — G47.33 OSA ON CPAP: ICD-10-CM

## 2017-12-27 DIAGNOSIS — E55.9 VITAMIN D DEFICIENCY: ICD-10-CM

## 2017-12-27 DIAGNOSIS — E11.69 DYSLIPIDEMIA ASSOCIATED WITH TYPE 2 DIABETES MELLITUS: ICD-10-CM

## 2017-12-27 DIAGNOSIS — E89.0 POST-SURGICAL HYPOTHYROIDISM: ICD-10-CM

## 2017-12-27 DIAGNOSIS — M85.89 OTHER SPECIFIED DISORDERS OF BONE DENSITY AND STRUCTURE, MULTIPLE SITES: ICD-10-CM

## 2017-12-27 DIAGNOSIS — J30.9 CHRONIC ALLERGIC RHINITIS, UNSPECIFIED SEASONALITY, UNSPECIFIED TRIGGER: ICD-10-CM

## 2017-12-27 DIAGNOSIS — E11.9 TYPE 2 DIABETES MELLITUS WITHOUT RETINOPATHY: Primary | ICD-10-CM

## 2017-12-27 DIAGNOSIS — Z79.4 TYPE 2 DIABETES MELLITUS WITHOUT COMPLICATION, WITH LONG-TERM CURRENT USE OF INSULIN: ICD-10-CM

## 2017-12-27 DIAGNOSIS — M85.80 OSTEOPENIA, UNSPECIFIED LOCATION: ICD-10-CM

## 2017-12-27 DIAGNOSIS — E66.01 MORBID OBESITY WITH BMI OF 45.0-49.9, ADULT: ICD-10-CM

## 2017-12-27 DIAGNOSIS — E11.59 HYPERTENSION ASSOCIATED WITH DIABETES: ICD-10-CM

## 2017-12-27 DIAGNOSIS — E78.5 DYSLIPIDEMIA ASSOCIATED WITH TYPE 2 DIABETES MELLITUS: ICD-10-CM

## 2017-12-27 PROCEDURE — 99499 UNLISTED E&M SERVICE: CPT | Mod: S$GLB,,, | Performed by: ALLERGY & IMMUNOLOGY

## 2017-12-27 PROCEDURE — 99499 UNLISTED E&M SERVICE: CPT | Mod: S$GLB,,, | Performed by: NURSE PRACTITIONER

## 2017-12-27 PROCEDURE — 95115 IMMUNOTHERAPY ONE INJECTION: CPT | Mod: S$GLB,,, | Performed by: INTERNAL MEDICINE

## 2017-12-27 PROCEDURE — 99999 PR PBB SHADOW E&M-EST. PATIENT-LVL III: CPT | Mod: PBBFAC,,, | Performed by: NURSE PRACTITIONER

## 2017-12-27 PROCEDURE — 99214 OFFICE O/P EST MOD 30 MIN: CPT | Mod: S$GLB,,, | Performed by: NURSE PRACTITIONER

## 2017-12-27 RX ORDER — LANCETS 28 GAUGE
1 EACH MISCELLANEOUS
Qty: 200 EACH | Refills: 6 | Status: SHIPPED | OUTPATIENT
Start: 2017-12-27 | End: 2017-12-27 | Stop reason: SDUPTHER

## 2017-12-27 RX ORDER — LEVOTHYROXINE SODIUM 75 UG/1
TABLET ORAL
Qty: 90 TABLET | Refills: 1 | Status: SHIPPED | OUTPATIENT
Start: 2017-12-27 | End: 2018-08-05 | Stop reason: SDUPTHER

## 2017-12-27 RX ORDER — PEN NEEDLE, DIABETIC 30 GX3/16"
NEEDLE, DISPOSABLE MISCELLANEOUS
Qty: 100 EACH | Refills: 3 | Status: SHIPPED | OUTPATIENT
Start: 2017-12-27 | End: 2019-02-04 | Stop reason: SDUPTHER

## 2017-12-27 RX ORDER — LANCETS 28 GAUGE
1 EACH MISCELLANEOUS
Qty: 200 EACH | Refills: 6 | Status: SHIPPED | OUTPATIENT
Start: 2017-12-27

## 2017-12-27 RX ORDER — REPAGLINIDE 1 MG/1
1 TABLET ORAL
Qty: 270 TABLET | Refills: 3 | Status: SHIPPED | OUTPATIENT
Start: 2017-12-27 | End: 2018-04-25 | Stop reason: SDUPTHER

## 2017-12-27 RX ORDER — METFORMIN HYDROCHLORIDE 1000 MG/1
1000 TABLET ORAL 2 TIMES DAILY WITH MEALS
Qty: 180 TABLET | Refills: 3 | Status: SHIPPED | OUTPATIENT
Start: 2017-12-27 | End: 2019-01-29 | Stop reason: SDUPTHER

## 2017-12-27 NOTE — ASSESSMENT & PLAN NOTE
-- Continue gabapentin and following with podiatry.  -- Educated patient on proper comfortable foot wear, to always wear dry socks, never walk barefoot, never cut toenails too short, never test bath water with feet, encouraged patient to inspect feet daily for wounds., and if patient applies lotion to this feet to always go around the foot and not in between toes.

## 2017-12-27 NOTE — ASSESSMENT & PLAN NOTE
- Tolerating ARB  - Patients /72 which is patients usual BP.    - Blood pressure goals discussed with patient

## 2017-12-27 NOTE — PROGRESS NOTES
Last 5 Patient Entered Readings Current 30 Day Average: 141/71     Recent Readings 1/7/2018 1/6/2018 1/6/2018 1/5/2018 1/4/2018    SBP (mmHg) 128 138 161 149 137    DBP (mmHg) 70 73 75 73 69    Pulse 75 63 66 68 78        Patient's BP average is above goal of <130/80.     Ms. Jose's BP average is improving, but she does have spikes. She has been having trouble with her BP cuff. It has been getting twisted when inflating. She said this causes elevated BP readings. She tries to flatten the cuff out and repeat her BP and it is usually lower. She will have her cuff evaluated to be sure it is not malfunctioning. She is having trouble cutting chlorthalidone tablets in half. Explained that if she needs to, she can take 1 tablet every other day instead of 1/2 tablet daily.    Patient denies s/s of hypertension (SOB, CP, severe headaches, changes in vision) associated with high readings. Instructed patient to go to the ED if BP > 180/110 and accompanied by hypertensive s/s, patient confirms understanding.    Will continue to monitor regularly. Will follow up in 2-3 weeks, sooner if BP begins to trend upward or downward.    Patient has my contact information and knows to call with any concerns or clinical changes.     Current HTN regimen:   Hypertension Medications             amlodipine (NORVASC) 5 MG tablet TAKE 1 TABLET EVERY DAY    chlorthalidone (HYGROTEN) 25 MG Tab Take 1/2 tablet (12.5 mg) by mouth once daily.    valsartan (DIOVAN) 160 MG tablet TAKE 1 TABLET TWICE DAILY

## 2017-12-27 NOTE — PROGRESS NOTES
Patient here for allergy injection.    0.5mL given SQ of 1:1 Red in Upper Left arm.  Tolerated well.     After 30 minutes of observation, patient noted to have a 0 reaction to injection.  No complaints voiced.

## 2017-12-27 NOTE — ASSESSMENT & PLAN NOTE
- Clinically and biochemically euthyroid  - Continue lt4 75 mcg daily   - Goal is a normal TSH  - Check TFTs and adjust dosage accordingly   - Avoid exogenous hyperthyroidism as this can accelerate bone loss and increase risk of CV complications.  - Advised to take LT4 on an empty stomach with water and to wait 30-45 minutes before eating or taking other medications   - Reviewed usual  times of thyroid hormone  changes- call if start/ stop ocps, weight changes, attempting conception and during pregnancy   - Reviewed that symptoms of hypothyroidism may not correlate with tsh, and a normal TSH is the goal of therapy.....  symptoms are not a justification for over treatment

## 2017-12-27 NOTE — ASSESSMENT & PLAN NOTE
-Reviewed goals of therapy are to get the best control we can without hypoglycemia  - Discussed diet and exercise today   - Check B12 with next labs due to long term metformin use   Medication changes:   Stop: Amaryl   Start: Repaglinide 1 mg TID AC  Continue: Metformin, victoza, & levemir   - glucagon emergency kit ordered.  - A1c goal 7.5%. Last A1c at goal.    - Urine UTD.   - Reviewed patient's current insulin regimen. Clarified proper insulin dose and timing in relation to meals, etc. Insulin injection sites and proper rotation instructed.     -Advised frequent self blood glucose monitoring.  Patient encouraged to document glucose results and bring them to every clinic visit    -Hypoglycemia precautions discussed. Instructed on precautions before driving.    -Close adherence to lifestyle changes recommended.   -Periodic follow ups for eye evaluations, foot care and dental care suggested.

## 2017-12-30 ENCOUNTER — PATIENT MESSAGE (OUTPATIENT)
Dept: ADMINISTRATIVE | Facility: OTHER | Age: 67
End: 2017-12-30

## 2018-01-05 ENCOUNTER — HOSPITAL ENCOUNTER (OUTPATIENT)
Dept: RADIOLOGY | Facility: HOSPITAL | Age: 68
Discharge: HOME | End: 2018-01-05
Attending: NURSE PRACTITIONER
Payer: MEDICARE

## 2018-01-05 ENCOUNTER — CLINICAL SUPPORT (OUTPATIENT)
Dept: INTERNAL MEDICINE | Facility: CLINIC | Age: 68
End: 2018-01-05
Payer: MEDICARE

## 2018-01-05 DIAGNOSIS — E78.5 DYSLIPIDEMIA ASSOCIATED WITH TYPE 2 DIABETES MELLITUS: ICD-10-CM

## 2018-01-05 DIAGNOSIS — Z79.4 TYPE 2 DIABETES MELLITUS WITHOUT COMPLICATION, WITH LONG-TERM CURRENT USE OF INSULIN: ICD-10-CM

## 2018-01-05 DIAGNOSIS — E03.9 HYPOTHYROIDISM, UNSPECIFIED TYPE: ICD-10-CM

## 2018-01-05 DIAGNOSIS — I15.2 HYPERTENSION ASSOCIATED WITH DIABETES: ICD-10-CM

## 2018-01-05 DIAGNOSIS — E11.42 DIABETIC POLYNEUROPATHY ASSOCIATED WITH TYPE 2 DIABETES MELLITUS: ICD-10-CM

## 2018-01-05 DIAGNOSIS — E89.0 POST-SURGICAL HYPOTHYROIDISM: ICD-10-CM

## 2018-01-05 DIAGNOSIS — E11.59 HYPERTENSION ASSOCIATED WITH DIABETES: ICD-10-CM

## 2018-01-05 DIAGNOSIS — J30.9 CHRONIC ALLERGIC RHINITIS, UNSPECIFIED SEASONALITY, UNSPECIFIED TRIGGER: ICD-10-CM

## 2018-01-05 DIAGNOSIS — E55.9 VITAMIN D DEFICIENCY: ICD-10-CM

## 2018-01-05 DIAGNOSIS — E66.01 MORBID OBESITY WITH BMI OF 45.0-49.9, ADULT: ICD-10-CM

## 2018-01-05 DIAGNOSIS — E11.9 TYPE 2 DIABETES MELLITUS WITHOUT COMPLICATION, WITH LONG-TERM CURRENT USE OF INSULIN: ICD-10-CM

## 2018-01-05 DIAGNOSIS — E11.9 TYPE 2 DIABETES MELLITUS WITHOUT RETINOPATHY: ICD-10-CM

## 2018-01-05 DIAGNOSIS — M85.89 OTHER SPECIFIED DISORDERS OF BONE DENSITY AND STRUCTURE, MULTIPLE SITES: ICD-10-CM

## 2018-01-05 DIAGNOSIS — E11.69 DYSLIPIDEMIA ASSOCIATED WITH TYPE 2 DIABETES MELLITUS: ICD-10-CM

## 2018-01-05 DIAGNOSIS — G47.33 OSA ON CPAP: ICD-10-CM

## 2018-01-05 PROCEDURE — 77080 DXA BONE DENSITY AXIAL: CPT | Mod: TC

## 2018-01-05 PROCEDURE — 99499 UNLISTED E&M SERVICE: CPT | Mod: S$GLB,,, | Performed by: ALLERGY & IMMUNOLOGY

## 2018-01-05 PROCEDURE — 95115 IMMUNOTHERAPY ONE INJECTION: CPT | Mod: S$GLB,,, | Performed by: FAMILY MEDICINE

## 2018-01-05 PROCEDURE — 77080 DXA BONE DENSITY AXIAL: CPT | Mod: 26,,, | Performed by: RADIOLOGY

## 2018-01-05 NOTE — PROGRESS NOTES
Patient here for allergy injection.    0.5mL given SQ of 1:1 Red in Upper Left arm.  Tolerated well.     After 30 minutes of observation, patient noted to have a 1+ reaction to injection noted.  Patient c/o itching at site.  Offered hydrocortisone, patient refused.

## 2018-01-10 ENCOUNTER — LAB VISIT (OUTPATIENT)
Dept: LAB | Facility: HOSPITAL | Age: 68
End: 2018-01-10
Attending: INTERNAL MEDICINE
Payer: MEDICARE

## 2018-01-10 DIAGNOSIS — E78.5 DYSLIPIDEMIA ASSOCIATED WITH TYPE 2 DIABETES MELLITUS: ICD-10-CM

## 2018-01-10 DIAGNOSIS — I15.2 HYPERTENSION ASSOCIATED WITH DIABETES: ICD-10-CM

## 2018-01-10 DIAGNOSIS — E11.42 DIABETIC POLYNEUROPATHY ASSOCIATED WITH TYPE 2 DIABETES MELLITUS: ICD-10-CM

## 2018-01-10 DIAGNOSIS — E11.9 TYPE 2 DIABETES MELLITUS WITHOUT COMPLICATION, WITH LONG-TERM CURRENT USE OF INSULIN: ICD-10-CM

## 2018-01-10 DIAGNOSIS — E11.59 HYPERTENSION ASSOCIATED WITH DIABETES: ICD-10-CM

## 2018-01-10 DIAGNOSIS — E11.9 TYPE 2 DIABETES MELLITUS WITHOUT RETINOPATHY: ICD-10-CM

## 2018-01-10 DIAGNOSIS — Z79.4 TYPE 2 DIABETES MELLITUS WITHOUT COMPLICATION, WITH LONG-TERM CURRENT USE OF INSULIN: ICD-10-CM

## 2018-01-10 DIAGNOSIS — E11.69 DYSLIPIDEMIA ASSOCIATED WITH TYPE 2 DIABETES MELLITUS: ICD-10-CM

## 2018-01-10 DIAGNOSIS — E66.01 MORBID OBESITY WITH BMI OF 45.0-49.9, ADULT: ICD-10-CM

## 2018-01-10 DIAGNOSIS — G47.33 OSA ON CPAP: ICD-10-CM

## 2018-01-10 DIAGNOSIS — E55.9 VITAMIN D DEFICIENCY: ICD-10-CM

## 2018-01-10 DIAGNOSIS — E03.9 HYPOTHYROIDISM, UNSPECIFIED TYPE: ICD-10-CM

## 2018-01-10 DIAGNOSIS — Z79.899 OTHER LONG TERM (CURRENT) DRUG THERAPY: ICD-10-CM

## 2018-01-10 DIAGNOSIS — E89.0 POST-SURGICAL HYPOTHYROIDISM: ICD-10-CM

## 2018-01-10 LAB
25(OH)D3+25(OH)D2 SERPL-MCNC: 25 NG/ML
ALBUMIN SERPL BCP-MCNC: 3 G/DL
ALP SERPL-CCNC: 88 U/L
ALT SERPL W/O P-5'-P-CCNC: 29 U/L
ANION GAP SERPL CALC-SCNC: 7 MMOL/L
AST SERPL-CCNC: 24 U/L
BASOPHILS # BLD AUTO: 0.06 K/UL
BASOPHILS NFR BLD: 0.6 %
BILIRUB SERPL-MCNC: 0.4 MG/DL
BUN SERPL-MCNC: 11 MG/DL
CALCIUM SERPL-MCNC: 9.6 MG/DL
CHLORIDE SERPL-SCNC: 103 MMOL/L
CO2 SERPL-SCNC: 32 MMOL/L
CREAT SERPL-MCNC: 0.9 MG/DL
DIFFERENTIAL METHOD: ABNORMAL
EOSINOPHIL # BLD AUTO: 0.3 K/UL
EOSINOPHIL NFR BLD: 2.7 %
ERYTHROCYTE [DISTWIDTH] IN BLOOD BY AUTOMATED COUNT: 14.9 %
EST. GFR  (AFRICAN AMERICAN): >60 ML/MIN/1.73 M^2
EST. GFR  (NON AFRICAN AMERICAN): >60 ML/MIN/1.73 M^2
ESTIMATED AVG GLUCOSE: 157 MG/DL
ESTIMATED AVG GLUCOSE: 157 MG/DL
GLUCOSE SERPL-MCNC: 101 MG/DL
HBA1C MFR BLD HPLC: 7.1 %
HBA1C MFR BLD HPLC: 7.1 %
HCT VFR BLD AUTO: 41 %
HGB BLD-MCNC: 13.1 G/DL
IMM GRANULOCYTES # BLD AUTO: 0.04 K/UL
IMM GRANULOCYTES NFR BLD AUTO: 0.4 %
LYMPHOCYTES # BLD AUTO: 2.8 K/UL
LYMPHOCYTES NFR BLD: 29.5 %
MCH RBC QN AUTO: 30.1 PG
MCHC RBC AUTO-ENTMCNC: 32 G/DL
MCV RBC AUTO: 94 FL
MONOCYTES # BLD AUTO: 0.9 K/UL
MONOCYTES NFR BLD: 9.7 %
NEUTROPHILS # BLD AUTO: 5.4 K/UL
NEUTROPHILS NFR BLD: 57.1 %
NRBC BLD-RTO: 0 /100 WBC
PLATELET # BLD AUTO: 297 K/UL
PMV BLD AUTO: 9.7 FL
POTASSIUM SERPL-SCNC: 3.8 MMOL/L
PROT SERPL-MCNC: 7.6 G/DL
RBC # BLD AUTO: 4.35 M/UL
SODIUM SERPL-SCNC: 142 MMOL/L
TSH SERPL DL<=0.005 MIU/L-ACNC: 2.42 UIU/ML
VIT B12 SERPL-MCNC: 364 PG/ML
WBC # BLD AUTO: 9.49 K/UL

## 2018-01-10 PROCEDURE — 82306 VITAMIN D 25 HYDROXY: CPT

## 2018-01-10 PROCEDURE — 84443 ASSAY THYROID STIM HORMONE: CPT

## 2018-01-10 PROCEDURE — 82607 VITAMIN B-12: CPT

## 2018-01-10 PROCEDURE — 85025 COMPLETE CBC W/AUTO DIFF WBC: CPT

## 2018-01-10 PROCEDURE — 83036 HEMOGLOBIN GLYCOSYLATED A1C: CPT

## 2018-01-10 PROCEDURE — 80053 COMPREHEN METABOLIC PANEL: CPT

## 2018-01-10 PROCEDURE — 36415 COLL VENOUS BLD VENIPUNCTURE: CPT | Mod: PO

## 2018-01-11 ENCOUNTER — CLINICAL SUPPORT (OUTPATIENT)
Dept: ALLERGY | Facility: CLINIC | Age: 68
End: 2018-01-11
Payer: MEDICARE

## 2018-01-11 DIAGNOSIS — J30.1 CHRONIC SEASONAL ALLERGIC RHINITIS DUE TO POLLEN: Primary | ICD-10-CM

## 2018-01-11 PROCEDURE — 95165 ANTIGEN THERAPY SERVICES: CPT | Mod: S$GLB,,, | Performed by: ALLERGY & IMMUNOLOGY

## 2018-01-11 PROCEDURE — 99499 UNLISTED E&M SERVICE: CPT | Mod: S$GLB,,, | Performed by: ALLERGY & IMMUNOLOGY

## 2018-01-12 ENCOUNTER — OFFICE VISIT (OUTPATIENT)
Dept: ALLERGY | Facility: CLINIC | Age: 68
End: 2018-01-12
Payer: MEDICARE

## 2018-01-12 ENCOUNTER — CLINICAL SUPPORT (OUTPATIENT)
Dept: INTERNAL MEDICINE | Facility: CLINIC | Age: 68
End: 2018-01-12
Payer: MEDICARE

## 2018-01-12 VITALS — HEIGHT: 63 IN | WEIGHT: 246.94 LBS | OXYGEN SATURATION: 98 % | BODY MASS INDEX: 43.75 KG/M2 | HEART RATE: 62 BPM

## 2018-01-12 DIAGNOSIS — J30.89 ALLERGIC RHINITIS DUE TO DUST MITE: Primary | ICD-10-CM

## 2018-01-12 DIAGNOSIS — J30.9 CHRONIC ALLERGIC RHINITIS, UNSPECIFIED SEASONALITY, UNSPECIFIED TRIGGER: ICD-10-CM

## 2018-01-12 DIAGNOSIS — J30.81 CHRONIC ALLERGIC RHINITIS DUE TO ANIMAL HAIR AND DANDER: ICD-10-CM

## 2018-01-12 DIAGNOSIS — H10.13 ALLERGIC CONJUNCTIVITIS, BILATERAL: ICD-10-CM

## 2018-01-12 DIAGNOSIS — Z51.6 ALLERGY DESENSITIZATION THERAPY: ICD-10-CM

## 2018-01-12 PROCEDURE — 95115 IMMUNOTHERAPY ONE INJECTION: CPT | Mod: S$GLB,,, | Performed by: FAMILY MEDICINE

## 2018-01-12 PROCEDURE — 99999 PR PBB SHADOW E&M-EST. PATIENT-LVL III: CPT | Mod: PBBFAC,,, | Performed by: ALLERGY & IMMUNOLOGY

## 2018-01-12 PROCEDURE — 99499 UNLISTED E&M SERVICE: CPT | Mod: S$GLB,,, | Performed by: ALLERGY & IMMUNOLOGY

## 2018-01-12 PROCEDURE — 99214 OFFICE O/P EST MOD 30 MIN: CPT | Mod: S$GLB,,, | Performed by: ALLERGY & IMMUNOLOGY

## 2018-01-12 RX ORDER — AZELASTINE 1 MG/ML
2 SPRAY, METERED NASAL 2 TIMES DAILY
Qty: 90 ML | Refills: 4 | Status: SHIPPED | OUTPATIENT
Start: 2018-01-12 | End: 2018-12-13 | Stop reason: SDUPTHER

## 2018-01-12 RX ORDER — FLUTICASONE PROPIONATE 50 MCG
2 SPRAY, SUSPENSION (ML) NASAL DAILY
Qty: 48 G | Refills: 4 | Status: SHIPPED | OUTPATIENT
Start: 2018-01-12 | End: 2018-12-13 | Stop reason: SDUPTHER

## 2018-01-12 NOTE — PROGRESS NOTES
Subjective:       Patient ID: Vivienne Jose is a 67 y.o. female.    Chief Complaint:  No chief complaint on file.      HPI Comments: 67 year-old woman presents for continued evaluation of chronic allergic rhinitis and conjunctivitis on IT.  She was last seen 1/20/16. In 2012 she had skin test with 4+ cat and dust mites and 1+ willow tree.  She was started on Claritin daily and fluticasone and Zaditor drops in morning.  She did ok on this but had flares so added azelastine 2 SEN BID as needed.  She would still  get sneeze, runny nose, sore throat, dry itchy eyes, stuffy nose and PND.  She started allergy shots 7/2015. She reached maintenance about 12/2015. She was off for several months last year due to ordering issues but is back on maintenance now. Gets 1 shots C, Dm, TR in left arm. She has done much better with shots, feels shots really help. No bad flares. No sinus infections. She has occ sneeze fits but minimal runny nose or congestion. She is on Flonase 2 SEN in AM, azelastine 1 SEN in PM, Zaditor 1 drop each eye in AM and Claritin at night.  She has had no reactions to shots, no itch, no hives, no swelling, no SOB, no wheeze, no dizziness.  She does have DM covers on all bedding.  She has a dog at home but no cats.      No CAD, she had angiogram and told mild blockage but no stents.  She is on ACE-I and ca channel blocker for HTN.      new patient evaluation of allergies.  She used to see Dr Weber and was on allergy shots from [id number].  She states they worked great and all symptoms resolved.  However over last year she has felt symptoms returning.  Her eyes itch intensely and are dry at times and water at times.  She has runny nose only when eating otherwise has some sinus pressure and PND>  No stuffy nose.  She sneezes a few times each morning.  She has always been worse around cats but does not have one.  Occ has chest congestion but no asthma.  NO eczema.  No food allergy.  She was worse few months ago  otherwise not sure of season.  No difference nay time of day, no diff inside or out.  Claritin prn helps but makes her sleepy.  Using Zaditor with minimal relief.        Environmental History: see history    Review of Systems   Constitutional: Negative for fever, chills, appetite change and fatigue.   HENT: Positive for rhinorrhea, sneezing and postnasal drip. Negative for ear pain, nosebleeds, congestion, sore throat, facial swelling, trouble swallowing, voice change, sinus pressure and ear discharge.    Eyes: Positive for discharge and itching. Negative for redness and visual disturbance.   Respiratory: Negative for cough, choking, chest tightness, shortness of breath and wheezing.    Cardiovascular: Negative for chest pain, palpitations and leg swelling.   Gastrointestinal: Negative for nausea, vomiting, abdominal pain, diarrhea, constipation and abdominal distention.   Genitourinary: Negative for difficulty urinating.   Musculoskeletal: Negative for myalgias, joint swelling, arthralgias and gait problem.   Skin: Negative for color change and rash.   Neurological: Negative for dizziness, syncope, weakness, light-headedness and headaches.   Hematological: Negative for adenopathy. Does not bruise/bleed easily.   Psychiatric/Behavioral: Negative for behavioral problems, confusion, disturbed wake/sleep cycle and agitation. The patient is not nervous/anxious.         Objective:    Physical Exam   Nursing note and vitals reviewed.  Constitutional: She is oriented to person, place, and time. She appears well-developed and well-nourished. No distress.   HENT:   Head: Normocephalic and atraumatic.   Right Ear: Hearing, tympanic membrane, external ear and ear canal normal.   Left Ear: Hearing, tympanic membrane, external ear and ear canal normal.   Nose: No mucosal edema, rhinorrhea, sinus tenderness or septal deviation. No epistaxis. Right sinus exhibits no maxillary sinus tenderness and no frontal sinus tenderness. Left  sinus exhibits no maxillary sinus tenderness and no frontal sinus tenderness.   Mouth/Throat: Uvula is midline, oropharynx is clear and moist and mucous membranes are normal. No uvula swelling.   Eyes: Conjunctivae are normal. Right eye exhibits no discharge. Left eye exhibits no discharge.   Neck: Normal range of motion. No thyromegaly present.   Cardiovascular: Normal rate, regular rhythm and normal heart sounds.    No murmur heard.  Pulmonary/Chest: Effort normal and breath sounds normal. No respiratory distress. She has no wheezes.   Abdominal: Soft. She exhibits no distension. There is no tenderness.   Musculoskeletal: Normal range of motion. She exhibits no edema and no tenderness.   Lymphadenopathy:     She has no cervical adenopathy.   Neurological: She is alert and oriented to person, place, and time.   Skin: Skin is warm and dry. No rash noted. No erythema.   Psychiatric: She has a normal mood and affect. Her behavior is normal. Judgment and thought content normal.       Laboratory:   Percutaneous Skin Testing: Inhalants- 4+ dust mites, 3+ cat, 1+ willow with 3+ histamine control and remainder all negative  Assessment:       1. Allergic rhinitis due to dust mite    2. Chronic allergic rhinitis due to animal hair and dander    3. Allergic conjunctivitis, bilateral    4. Allergy desensitization therapy         Plan:       1. Dust mite avoidance, measures discussed and handout provided.   2. Continue Flonase 2 SEN daily  3. Continue Claritin 10 mg daily  4. Zaditor prn  5. Continue azelastine 1 SEN nightly and increase to BID as needed  6. continue immunotherapy, vial 1:1 0.5cc monthly. Patient voiced understanding and agreed.    Patient advised to remain off beta blockers while on allergy shots and to notify injection clinic and MD of any new medications starts.

## 2018-01-19 ENCOUNTER — OFFICE VISIT (OUTPATIENT)
Dept: INTERNAL MEDICINE | Facility: CLINIC | Age: 68
End: 2018-01-19
Payer: MEDICARE

## 2018-01-19 VITALS
WEIGHT: 247.13 LBS | HEART RATE: 88 BPM | SYSTOLIC BLOOD PRESSURE: 144 MMHG | BODY MASS INDEX: 45.48 KG/M2 | DIASTOLIC BLOOD PRESSURE: 60 MMHG | HEIGHT: 62 IN

## 2018-01-19 DIAGNOSIS — Z23 NEED FOR PROPHYLACTIC VACCINATION WITH STREPTOCOCCUS PNEUMONIAE (PNEUMOCOCCUS) AND INFLUENZA VACCINES: ICD-10-CM

## 2018-01-19 DIAGNOSIS — E66.01 MORBID OBESITY WITH BMI OF 45.0-49.9, ADULT: ICD-10-CM

## 2018-01-19 DIAGNOSIS — E78.5 HYPERLIPIDEMIA, UNSPECIFIED HYPERLIPIDEMIA TYPE: ICD-10-CM

## 2018-01-19 DIAGNOSIS — E11.59 HYPERTENSION ASSOCIATED WITH DIABETES: ICD-10-CM

## 2018-01-19 DIAGNOSIS — G47.33 OSA ON CPAP: ICD-10-CM

## 2018-01-19 DIAGNOSIS — E55.9 VITAMIN D DEFICIENCY: ICD-10-CM

## 2018-01-19 DIAGNOSIS — E03.9 HYPOTHYROIDISM, UNSPECIFIED TYPE: ICD-10-CM

## 2018-01-19 DIAGNOSIS — I15.2 HYPERTENSION ASSOCIATED WITH DIABETES: ICD-10-CM

## 2018-01-19 PROCEDURE — G0009 ADMIN PNEUMOCOCCAL VACCINE: HCPCS | Mod: S$GLB,,, | Performed by: INTERNAL MEDICINE

## 2018-01-19 PROCEDURE — 99214 OFFICE O/P EST MOD 30 MIN: CPT | Mod: S$GLB,,, | Performed by: INTERNAL MEDICINE

## 2018-01-19 PROCEDURE — 99999 PR PBB SHADOW E&M-EST. PATIENT-LVL IV: CPT | Mod: PBBFAC,,, | Performed by: INTERNAL MEDICINE

## 2018-01-19 PROCEDURE — 99499 UNLISTED E&M SERVICE: CPT | Mod: S$GLB,,, | Performed by: INTERNAL MEDICINE

## 2018-01-19 PROCEDURE — 90670 PCV13 VACCINE IM: CPT | Mod: S$GLB,,, | Performed by: INTERNAL MEDICINE

## 2018-01-19 RX ORDER — CONJUGATED ESTROGENS 0.62 MG/G
CREAM VAGINAL
COMMUNITY
Start: 2018-01-12 | End: 2018-10-01 | Stop reason: SDUPTHER

## 2018-01-19 RX ORDER — CHLORTHALIDONE 25 MG/1
25 TABLET ORAL DAILY
Qty: 30 TABLET | Refills: 1 | Status: SHIPPED | OUTPATIENT
Start: 2018-01-19 | End: 2018-05-07 | Stop reason: SDUPTHER

## 2018-01-19 RX ORDER — LOVASTATIN 40 MG/1
40 TABLET ORAL NIGHTLY
Qty: 90 TABLET | Refills: 1 | Status: SHIPPED | OUTPATIENT
Start: 2018-01-19 | End: 2018-08-05 | Stop reason: SDUPTHER

## 2018-01-19 NOTE — PROGRESS NOTES
Pt. ID: Vivienne Jose is a 67 y.o. female      Chief complaint:   Chief Complaint   Patient presents with    Diabetes     follow up       HPI: Pt. Here for f/u for DM and HTN; pt.  is with the pt.; I reviewed labs dated 1/10/18; HGBA1C was 7.1; endocrine has recently adjusted meds; TSH is WNL; vitamin D was low and she has started vitamin D; she is using CPAP nightly; BP is borderline elevated and she was taken off HCTZ and placed on chlorthalidone 25 mg 1/2 tab QD; she states she cannot cut it in 1/2 and I advised her to take daily; she has gained weight; she would like prevnar; she need refill on statin      Review of Systems   Constitutional: Negative for malaise/fatigue.   Eyes: Negative for blurred vision.   Respiratory: Negative for shortness of breath.    Cardiovascular: Negative for chest pain, palpitations, orthopnea and PND.   Musculoskeletal: Negative for neck pain.   Neurological: Negative for headaches.         Objective:    Physical Exam   Constitutional: She is oriented to person, place, and time.   Morbid obesity   Eyes: EOM are normal.   Neck: Normal range of motion.   Cardiovascular: Normal rate, regular rhythm and normal heart sounds.    Pulmonary/Chest: Effort normal and breath sounds normal.   Abdominal: Soft. There is no tenderness. There is no rebound and no guarding.   Musculoskeletal: Normal range of motion.   Neurological: She is alert and oriented to person, place, and time.   Skin: No rash noted.         Health Maintenance   Topic Date Due    Pneumococcal (65+) (1 of 2 - PCV13) 06/24/2016    Eye Exam  05/04/2018    Hemoglobin A1c  07/10/2018    Lipid Panel  09/08/2018    Foot Exam  12/11/2018    Mammogram  08/04/2019    DEXA SCAN  01/05/2021    TETANUS VACCINE  06/19/2024    Colonoscopy  09/13/2027    Hepatitis C Screening  Completed    Zoster Vaccine  Completed    Influenza Vaccine  Completed         Assessment:     1. Uncontrolled type 2 diabetes mellitus without  complication, without long-term current use of insulin Sub-optimally controlled   2. Hypertension associated with diabetes Sub-optimally controlled   3. Hypothyroidism, unspecified type Well controlled   4. Hyperlipidemia, unspecified hyperlipidemia type Active   5. BAM on CPAP Active   6. Vitamin D deficiency Sub-optimally controlled   7. Morbid obesity with BMI of 45.0-49.9, adult Sub-optimally controlled   8. Need for prophylactic vaccination with Streptococcus pneumoniae (Pneumococcus) and Influenza vaccines Active         Plan: Uncontrolled type 2 diabetes mellitus without complication, without long-term current use of insulin  Comments:  continue current regimen which was recently adjusted by endocrine; encouraged ADA diet     Hypertension associated with diabetes  Comments:  increase chlorthalidone to 25 mg QD and encouraged low Na diet and weight loss; repeat BP on f/u in 1 month   Orders:  -     chlorthalidone (HYGROTEN) 25 MG Tab; Take 1 tablet (25 mg total) by mouth once daily.  Dispense: 30 tablet; Refill: 1    Hypothyroidism, unspecified type  Comments:  continue current regimen     Hyperlipidemia, unspecified hyperlipidemia type  Comments:  continue current regimen and encouraged diet modification   Orders:  -     lovastatin (MEVACOR) 40 MG tablet; Take 1 tablet (40 mg total) by mouth nightly.  Dispense: 90 tablet; Refill: 1    BAM on CPAP  Comments:  continue CPAP    Vitamin D deficiency  Comments:  continue vitamin D     Morbid obesity with BMI of 45.0-49.9, adult  Comments:  encouraged diet and explained risks     Need for prophylactic vaccination with Streptococcus pneumoniae (Pneumococcus) and Influenza vaccines  -     (In Office Administered) Pneumococcal Conjugate Vaccine (13 Valent) (IM)        Problem List Items Addressed This Visit        Cardiac/Vascular    Hyperlipidemia    Relevant Medications    lovastatin (MEVACOR) 40 MG tablet    Hypertension associated with diabetes    Relevant  Medications    chlorthalidone (HYGROTEN) 25 MG Tab       ID    Need for prophylactic vaccination with Streptococcus pneumoniae (Pneumococcus) and Influenza vaccines    Relevant Orders    (In Office Administered) Pneumococcal Conjugate Vaccine (13 Valent) (IM)       Endocrine    Type 2 diabetes mellitus, uncontrolled - Primary    Vitamin D deficiency    Morbid obesity with BMI of 45.0-49.9, adult    Hypothyroidism       Other    BAM on CPAP

## 2018-01-22 ENCOUNTER — PATIENT OUTREACH (OUTPATIENT)
Dept: OTHER | Facility: OTHER | Age: 68
End: 2018-01-22

## 2018-01-22 DIAGNOSIS — I10 ESSENTIAL HYPERTENSION: Primary | ICD-10-CM

## 2018-01-22 NOTE — PROGRESS NOTES
"Last 5 Patient Entered Readings                                      Current 30 Day Average: 138/71     Recent Readings 1/21/2018 1/20/2018 1/19/2018 1/15/2018 1/14/2018    SBP (mmHg) 132 138 132 143 143    DBP (mmHg) 66 63 66 67 67    Pulse 74 75 74 68 72        Patient's BP average is above goal of <130/80.     Ms. Jose was able to get her BP cuff replaced and it is no longer getting "bunched up" in the middle. She saw Dr. Felton on Friday and he increased chlorthalidone to 25 mg QD. She will start taking this dose tomorrow because she has to  the new prescription. Will repeat BMP next week.     Will continue to monitor regularly. Will follow up in 2-3 weeks, sooner if BP begins to trend upward or downward.    Patient has my contact information and knows to call with any concerns or clinical changes.     Current HTN regimen:  Hypertension Medications             amlodipine (NORVASC) 5 MG tablet TAKE 1 TABLET EVERY DAY    chlorthalidone (HYGROTEN) 25 MG Tab Take 1 tablet (25 mg total) by mouth once daily.    valsartan (DIOVAN) 160 MG tablet TAKE 1 TABLET TWICE DAILY            "

## 2018-01-24 ENCOUNTER — LAB VISIT (OUTPATIENT)
Dept: LAB | Facility: HOSPITAL | Age: 68
End: 2018-01-24
Attending: UROLOGY
Payer: MEDICARE

## 2018-01-24 ENCOUNTER — CLINICAL SUPPORT (OUTPATIENT)
Dept: INTERNAL MEDICINE | Facility: CLINIC | Age: 68
End: 2018-01-24
Payer: MEDICARE

## 2018-01-24 ENCOUNTER — TELEPHONE (OUTPATIENT)
Dept: UROLOGY | Facility: CLINIC | Age: 68
End: 2018-01-24

## 2018-01-24 DIAGNOSIS — N39.0 URINARY TRACT INFECTION WITHOUT HEMATURIA, SITE UNSPECIFIED: ICD-10-CM

## 2018-01-24 DIAGNOSIS — J30.9 CHRONIC ALLERGIC RHINITIS, UNSPECIFIED SEASONALITY, UNSPECIFIED TRIGGER: ICD-10-CM

## 2018-01-24 DIAGNOSIS — N39.0 URINARY TRACT INFECTION WITHOUT HEMATURIA, SITE UNSPECIFIED: Primary | ICD-10-CM

## 2018-01-24 PROCEDURE — 99499 UNLISTED E&M SERVICE: CPT | Mod: S$GLB,,, | Performed by: ALLERGY & IMMUNOLOGY

## 2018-01-24 PROCEDURE — 87186 SC STD MICRODIL/AGAR DIL: CPT

## 2018-01-24 PROCEDURE — 95115 IMMUNOTHERAPY ONE INJECTION: CPT | Mod: S$GLB,,, | Performed by: FAMILY MEDICINE

## 2018-01-24 PROCEDURE — 87086 URINE CULTURE/COLONY COUNT: CPT

## 2018-01-24 PROCEDURE — 87088 URINE BACTERIA CULTURE: CPT

## 2018-01-24 PROCEDURE — 87077 CULTURE AEROBIC IDENTIFY: CPT

## 2018-01-24 NOTE — PROGRESS NOTES
Pt. ID: Vivienne Jose is a 67 y.o. female      Chief complaint:   No chief complaint on file.      HPI: Pt. Here for f/u for DM and HTN; pt.  is with the pt.; I reviewed labs dated 1/10/18; HGBA1C was 7.1; endocrine has recently adjusted meds; TSH is WNL; vitamin D was low and she has started vitamin D; she is using CPAP nightly; BP is borderline elevated and she was taken off HCTZ and placed on chlorthalidone 25 mg 1/2 tab QD; she states she cannot cut it in 1/2 and I advised her to take daily; she has gained weight; she would like prevnar; she need refill on statin      Review of Systems   Constitutional: Negative for malaise/fatigue.   Eyes: Negative for blurred vision.   Respiratory: Negative for shortness of breath.    Cardiovascular: Negative for chest pain, palpitations, orthopnea and PND.   Musculoskeletal: Negative for neck pain.   Neurological: Negative for headaches.         Objective:    Physical Exam   Constitutional: She is oriented to person, place, and time.   Morbid obesity   Eyes: EOM are normal.   Neck: Normal range of motion.   Cardiovascular: Normal rate, regular rhythm and normal heart sounds.    Pulmonary/Chest: Effort normal and breath sounds normal.   Abdominal: Soft. There is no tenderness. There is no rebound and no guarding.   Musculoskeletal: Normal range of motion.   Neurological: She is alert and oriented to person, place, and time.   Skin: No rash noted.         Health Maintenance   Topic Date Due    Eye Exam  05/04/2018    Hemoglobin A1c  07/10/2018    Lipid Panel  09/08/2018    Foot Exam  12/11/2018    Mammogram  08/04/2019    Pneumococcal (65+) (2 of 2 - PPSV23) 04/28/2020    DEXA SCAN  01/05/2021    TETANUS VACCINE  06/19/2024    Colonoscopy  09/13/2027    Hepatitis C Screening  Completed    Zoster Vaccine  Completed    Influenza Vaccine  Completed         Assessment:     No diagnosis found.      Plan: There are no diagnoses linked to this encounter.    Problem  List Items Addressed This Visit     None

## 2018-01-24 NOTE — PROGRESS NOTES
Patient here for allergy injection.  No new meds, no problems with last injection.    0.5mL of Red 1:1 given SQ in upper Left arm.  Tolerated well.    Site checked after 30 minutes, 1+ reaction of erythema noted.    No complaints voiced.

## 2018-01-26 LAB — BACTERIA UR CULT: NORMAL

## 2018-01-29 ENCOUNTER — TELEPHONE (OUTPATIENT)
Dept: UROLOGY | Facility: CLINIC | Age: 68
End: 2018-01-29

## 2018-01-29 ENCOUNTER — PATIENT OUTREACH (OUTPATIENT)
Dept: OTHER | Facility: OTHER | Age: 68
End: 2018-01-29

## 2018-01-29 ENCOUNTER — PATIENT MESSAGE (OUTPATIENT)
Dept: UROLOGY | Facility: CLINIC | Age: 68
End: 2018-01-29

## 2018-01-29 DIAGNOSIS — N30.90 BLADDER INFECTION: Primary | ICD-10-CM

## 2018-01-29 RX ORDER — AMOXICILLIN AND CLAVULANATE POTASSIUM 500; 125 MG/1; MG/1
1 TABLET, FILM COATED ORAL 3 TIMES DAILY
Qty: 21 TABLET | Refills: 0 | Status: SHIPPED | OUTPATIENT
Start: 2018-01-29 | End: 2018-02-23

## 2018-01-29 NOTE — PROGRESS NOTES
"Last 5 Patient Entered Readings                                      Current 30 Day Average: 138/71     Recent Readings 1/28/2018 1/27/2018 1/26/2018 1/25/2018 1/24/2018    SBP (mmHg) 139 133 132 138 146    DBP (mmHg) 66 72 58 67 73    Pulse 77 72 69 78 70        Hypertension Digital Medicine Program (HDMP): Health  Follow Up    Lifestyle Modifications:    1.Low sodium diet: no Patient reports that she has tried cooking at home, but her  "pouts" and "is mad the rest of the evening". Patient states that tries to choose healthier options when dining out. Will send [facility] ordering meals link via [employer].    2.Physical activity: no Mrs. Jose had her toenails removed in December. Patient states that she has not been able to wear closed toe shoes since then. Mrs. Jose is leaving her job March 31. She hopes that her wounds are healed, and she will be able to return to the gym regularly.    3.Hypotension/Hypertension symptoms: no   Frequency/Alleviating factors/Precipitating factors, etc.     4.Patient has been compliant with the medication regimen.     Follow up with Mrs. Vivienne Jose completed. Mrs. Jose is doing okay. Encouragement provided for patient to stick with goals set for dining in and going to the gym starting in March. No further questions or concerns. I will follow up in a few weeks to assess progress.       "

## 2018-02-01 ENCOUNTER — LAB VISIT (OUTPATIENT)
Dept: LAB | Facility: HOSPITAL | Age: 68
End: 2018-02-01
Attending: INTERNAL MEDICINE
Payer: MEDICARE

## 2018-02-01 DIAGNOSIS — I10 ESSENTIAL HYPERTENSION: ICD-10-CM

## 2018-02-01 LAB
ANION GAP SERPL CALC-SCNC: 11 MMOL/L
BUN SERPL-MCNC: 17 MG/DL
CALCIUM SERPL-MCNC: 9.6 MG/DL
CHLORIDE SERPL-SCNC: 98 MMOL/L
CO2 SERPL-SCNC: 30 MMOL/L
CREAT SERPL-MCNC: 1 MG/DL
EST. GFR  (AFRICAN AMERICAN): >60 ML/MIN/1.73 M^2
EST. GFR  (NON AFRICAN AMERICAN): 58.8 ML/MIN/1.73 M^2
GLUCOSE SERPL-MCNC: 217 MG/DL
POTASSIUM SERPL-SCNC: 3.5 MMOL/L
SODIUM SERPL-SCNC: 139 MMOL/L

## 2018-02-01 PROCEDURE — 80048 BASIC METABOLIC PNL TOTAL CA: CPT

## 2018-02-01 PROCEDURE — 36415 COLL VENOUS BLD VENIPUNCTURE: CPT | Mod: PO

## 2018-02-02 ENCOUNTER — CLINICAL SUPPORT (OUTPATIENT)
Dept: INTERNAL MEDICINE | Facility: CLINIC | Age: 68
End: 2018-02-02
Payer: MEDICARE

## 2018-02-02 DIAGNOSIS — J30.9 ACUTE ALLERGIC RHINITIS, UNSPECIFIED SEASONALITY, UNSPECIFIED TRIGGER: ICD-10-CM

## 2018-02-02 PROCEDURE — 95115 IMMUNOTHERAPY ONE INJECTION: CPT | Mod: S$GLB,,, | Performed by: FAMILY MEDICINE

## 2018-02-02 PROCEDURE — 99499 UNLISTED E&M SERVICE: CPT | Mod: S$GLB,,, | Performed by: ALLERGY & IMMUNOLOGY

## 2018-02-02 NOTE — PROGRESS NOTES
Patient here for allergy injection.  No new meds, no problems with last injection.  Patient has new vial.    0.35mL of Red 1:1 given SQ in upper Left arm.  Tolerated well.    Site checked after 30 minutes, 1+ reaction of erythema noted.    No complaints voiced.

## 2018-02-07 ENCOUNTER — PATIENT OUTREACH (OUTPATIENT)
Dept: OTHER | Facility: OTHER | Age: 68
End: 2018-02-07

## 2018-02-07 NOTE — PROGRESS NOTES
Last 5 Patient Entered Readings                                      Current 30 Day Average: 137/69     Recent Readings 2/6/2018 2/5/2018 2/4/2018 2/3/2018 2/1/2018    SBP (mmHg) 123 137 135 133 140    DBP (mmHg) 67 73 71 60 59    Pulse 69 69 74 72 76        Ms. Jose's BP readings are improving on chlorthalidone 25 mg QD, but average remains above goal. She eats out frequently and knows this is a contributing factor to higher BP readings. She is also not physically active. Encouraged her to ask for low sodium items while dining out and to try to work toward getting 10,000 steps each day. She has noticed an increase in leg cramps overnight while on higher dose of chlorthalidone. She plans to increase her potassium intake through potassium rich foods. She also has KCL supplement at home that she takes 1-2 times/week. Explained that her potassium was WNL on BMP last week and she can continue to use the KCL as she is. If cramps worsen, will repeat BMP and discuss increasing KCL dose.     Will continue to monitor regularly. Will follow up in 4-6 weeks, sooner if BP begins to trend upward or downward.    Patient has my contact information and knows to call with any concerns or clinical changes.     Current HTN regimen:  Hypertension Medications             amlodipine (NORVASC) 5 MG tablet TAKE 1 TABLET EVERY DAY    chlorthalidone (HYGROTEN) 25 MG Tab Take 1 tablet (25 mg total) by mouth once daily.    valsartan (DIOVAN) 160 MG tablet TAKE 1 TABLET TWICE DAILY

## 2018-02-09 ENCOUNTER — CLINICAL SUPPORT (OUTPATIENT)
Dept: INTERNAL MEDICINE | Facility: CLINIC | Age: 68
End: 2018-02-09
Payer: MEDICARE

## 2018-02-09 DIAGNOSIS — J30.9 CHRONIC ALLERGIC RHINITIS, UNSPECIFIED SEASONALITY, UNSPECIFIED TRIGGER: ICD-10-CM

## 2018-02-09 PROCEDURE — 95115 IMMUNOTHERAPY ONE INJECTION: CPT | Mod: S$GLB,,, | Performed by: FAMILY MEDICINE

## 2018-02-09 PROCEDURE — 99499 UNLISTED E&M SERVICE: CPT | Mod: S$GLB,,, | Performed by: ALLERGY & IMMUNOLOGY

## 2018-02-09 NOTE — PROGRESS NOTES
Patient here for allergy injection.  No new meds, no problems with last injection.  Patient has new vial.    0.4mL of Red 1:1 given SQ in upper Left arm.  Tolerated well.    Site checked after 30 minutes, 1+ reaction of erythema noted.    No complaints voiced.

## 2018-02-16 ENCOUNTER — CLINICAL SUPPORT (OUTPATIENT)
Dept: INTERNAL MEDICINE | Facility: CLINIC | Age: 68
End: 2018-02-16
Payer: MEDICARE

## 2018-02-16 DIAGNOSIS — J30.9 CHRONIC ALLERGIC RHINITIS, UNSPECIFIED SEASONALITY, UNSPECIFIED TRIGGER: ICD-10-CM

## 2018-02-16 PROCEDURE — 99499 UNLISTED E&M SERVICE: CPT | Mod: S$GLB,,, | Performed by: ALLERGY & IMMUNOLOGY

## 2018-02-16 PROCEDURE — 95115 IMMUNOTHERAPY ONE INJECTION: CPT | Mod: S$GLB,,, | Performed by: FAMILY MEDICINE

## 2018-02-23 ENCOUNTER — CLINICAL SUPPORT (OUTPATIENT)
Dept: INTERNAL MEDICINE | Facility: CLINIC | Age: 68
End: 2018-02-23
Payer: MEDICARE

## 2018-02-23 ENCOUNTER — OFFICE VISIT (OUTPATIENT)
Dept: FAMILY MEDICINE | Facility: CLINIC | Age: 68
End: 2018-02-23
Payer: MEDICARE

## 2018-02-23 VITALS
DIASTOLIC BLOOD PRESSURE: 62 MMHG | HEART RATE: 70 BPM | HEIGHT: 62 IN | BODY MASS INDEX: 45.44 KG/M2 | WEIGHT: 246.94 LBS | SYSTOLIC BLOOD PRESSURE: 130 MMHG | OXYGEN SATURATION: 96 %

## 2018-02-23 DIAGNOSIS — E66.01 MORBID OBESITY WITH BMI OF 45.0-49.9, ADULT: ICD-10-CM

## 2018-02-23 DIAGNOSIS — E78.5 DYSLIPIDEMIA ASSOCIATED WITH TYPE 2 DIABETES MELLITUS: ICD-10-CM

## 2018-02-23 DIAGNOSIS — I77.9 BILATERAL CAROTID ARTERY DISEASE: ICD-10-CM

## 2018-02-23 DIAGNOSIS — K21.9 GASTROESOPHAGEAL REFLUX DISEASE, ESOPHAGITIS PRESENCE NOT SPECIFIED: ICD-10-CM

## 2018-02-23 DIAGNOSIS — E11.22 TYPE 2 DIABETES MELLITUS WITH STAGE 3 CHRONIC KIDNEY DISEASE, WITH LONG-TERM CURRENT USE OF INSULIN: ICD-10-CM

## 2018-02-23 DIAGNOSIS — Z00.00 ENCOUNTER FOR PREVENTIVE HEALTH EXAMINATION: Primary | ICD-10-CM

## 2018-02-23 DIAGNOSIS — J30.1 CHRONIC SEASONAL ALLERGIC RHINITIS DUE TO POLLEN: ICD-10-CM

## 2018-02-23 DIAGNOSIS — E11.69 DYSLIPIDEMIA ASSOCIATED WITH TYPE 2 DIABETES MELLITUS: ICD-10-CM

## 2018-02-23 DIAGNOSIS — Z79.4 TYPE 2 DIABETES MELLITUS WITH STAGE 3 CHRONIC KIDNEY DISEASE, WITH LONG-TERM CURRENT USE OF INSULIN: ICD-10-CM

## 2018-02-23 DIAGNOSIS — M47.816 LUMBAR FACET ARTHROPATHY: ICD-10-CM

## 2018-02-23 DIAGNOSIS — E89.0 POST-SURGICAL HYPOTHYROIDISM: ICD-10-CM

## 2018-02-23 DIAGNOSIS — I15.2 HYPERTENSION ASSOCIATED WITH DIABETES: ICD-10-CM

## 2018-02-23 DIAGNOSIS — E11.59 HYPERTENSION ASSOCIATED WITH DIABETES: ICD-10-CM

## 2018-02-23 DIAGNOSIS — N18.30 TYPE 2 DIABETES MELLITUS WITH STAGE 3 CHRONIC KIDNEY DISEASE, WITH LONG-TERM CURRENT USE OF INSULIN: ICD-10-CM

## 2018-02-23 DIAGNOSIS — M47.812 FACET ARTHROPATHY, CERVICAL: ICD-10-CM

## 2018-02-23 PROCEDURE — 99499 UNLISTED E&M SERVICE: CPT | Mod: S$GLB,,, | Performed by: ALLERGY & IMMUNOLOGY

## 2018-02-23 PROCEDURE — 99999 PR PBB SHADOW E&M-EST. PATIENT-LVL V: CPT | Mod: PBBFAC,,, | Performed by: NURSE PRACTITIONER

## 2018-02-23 PROCEDURE — 99499 UNLISTED E&M SERVICE: CPT | Mod: S$GLB,,, | Performed by: NURSE PRACTITIONER

## 2018-02-23 PROCEDURE — 95115 IMMUNOTHERAPY ONE INJECTION: CPT | Mod: S$GLB,,, | Performed by: FAMILY MEDICINE

## 2018-02-23 PROCEDURE — G0439 PPPS, SUBSEQ VISIT: HCPCS | Mod: S$GLB,,, | Performed by: NURSE PRACTITIONER

## 2018-02-23 RX ORDER — KETOTIFEN FUMARATE 0.35 MG/ML
1-2 SOLUTION/ DROPS OPHTHALMIC DAILY
COMMUNITY

## 2018-02-23 NOTE — PATIENT INSTRUCTIONS
Counseling and Referral of Other Preventative  (Italic type indicates deductible and co-insurance are waived)    Patient Name: Vivienne Jose  Today's Date: 2/23/2018    Health Maintenance       Date Due Completion Date    Eye Exam 05/04/2018 5/4/2017    Hemoglobin A1c 07/10/2018 1/10/2018    Lipid Panel 09/08/2018 9/8/2017    Foot Exam 12/11/2018 12/11/2017    Override on 8/2/2017: Done    Override on 4/28/2015: Done    High Dose Statin 01/19/2019 1/19/2018    Mammogram 08/04/2019 8/4/2017    Pneumococcal (65+) (2 of 2 - PPSV23) 04/28/2020 1/19/2018    DEXA SCAN 01/05/2021 1/5/2018    TETANUS VACCINE 06/19/2024 6/19/2014    Colonoscopy 09/13/2027 9/13/2017        No orders of the defined types were placed in this encounter.    The following information is provided to all patients.  This information is to help you find resources for any of the problems found today that may be affecting your health:                Living healthy guide: www.[facility].[place].gov      Understanding Diabetes: www.diabetes.org      Eating healthy: www.cdc.gov/healthyweight      CDC home safety checklist: www.cdc.gov/steadi/patient.html      Agency on Aging: www.goea.[place].gov      Alcoholics anonymous (AA): www.aa.org      Physical Activity: www.leno.nih.gov/um5gnxu      Tobacco use: www.quitwithusla.org

## 2018-02-23 NOTE — PROGRESS NOTES
Patient here for allergy injection.  No new meds, no problems with last injection.  Patient has new vial.    0.45mL of Red 1:1 given SQ in upper Left arm.  Tolerated well.    Site checked after 30 minutes, 0reaction of erythema noted.    No complaints voiced.

## 2018-02-23 NOTE — PROGRESS NOTES
"Vivienne Jose presented for a  Medicare AWV and comprehensive Health Risk Assessment today. The following components were reviewed and updated:    · Medical history  · Family History  · Social history  · Allergies and Current Medications  · Health Risk Assessment  · Health Maintenance  · Care Team     ** See Completed Assessments for Annual Wellness Visit within the encounter summary.**       The following assessments were completed:  · Living Situation  · CAGE  · Depression Screening  · Timed Get Up and Go  · Whisper Test  · Cognitive Function Screening  · Nutrition Screening  · ADL Screening  · PAQ Screening    Vitals:    02/23/18 1325   BP: 130/62   BP Location: Right arm   Patient Position: Sitting   BP Method: Large (Manual)   Pulse: 70   SpO2: 96%   Weight: 112 kg (246 lb 14.6 oz)   Height: 5' 2" (1.575 m)     Body mass index is 45.16 kg/m².     Physical Exam   Constitutional: She is oriented to person, place, and time. She appears well-developed. No distress.   Morbidly obese   HENT:   Head: Normocephalic and atraumatic.   Eyes: EOM are normal. Pupils are equal, round, and reactive to light.   Neck: Neck supple. No JVD present. No tracheal deviation present.   Cardiovascular: Normal rate, regular rhythm, normal heart sounds and intact distal pulses.    No murmur heard.  Pulmonary/Chest: Effort normal and breath sounds normal. No respiratory distress. She has no wheezes. She has no rales.   Abdominal: Soft. Bowel sounds are normal. She exhibits no distension and no mass. There is no tenderness.   Musculoskeletal: Normal range of motion. She exhibits no edema or tenderness.   Neurological: She is alert and oriented to person, place, and time. Coordination normal.   Skin: Skin is warm and dry. No erythema. No pallor.   Psychiatric: She has a normal mood and affect. Her behavior is normal. Judgment and thought content normal. Cognition and memory are normal. She expresses no homicidal and no suicidal ideation. "   Nursing note and vitals reviewed.        Diagnoses and health risks identified today and associated recommendations/orders:    1. Encounter for preventive health examination    2. Type 2 diabetes mellitus with stage 3 chronic kidney disease, with long-term current use of insulin  Chronic; stable on medication.  Followed by Endocrinology.    3. Hypertension associated with diabetes  Chronic; stable on medication.  Followed by PCP.    4. Dyslipidemia associated with type 2 diabetes mellitus  Chronic; stable on medication.  Followed by PCP.    5. Bilateral carotid artery disease  Chronic; stable.  As seen on imaging dated 07/26/11.  Followed by Cardiology.    6. Post-surgical hypothyroidism  Chronic; stable on medication.  Followed by Endocrinology.    7. Gastroesophageal reflux disease, esophagitis presence not specified  Chronic; stable on medication.  Followed by PCP.    8. Facet arthropathy, cervical  Chronic; stable.  As seen on imaging dated 07/25/11.  Followed by PCP.    9. Lumbar facet arthropathy  Chronic; stable.  As seen on imaging dated 06/01/15.  Followed by PCP.    10. Morbid obesity with BMI of 45.0-49.9, adult  Chronic, stable. Therapeutic lifestyle changes discussed. Followed by PCP.      Provided Vivienne with a 5-10 year written screening schedule and personal prevention plan. Recommendations were developed using the USPSTF age appropriate recommendations. Education, counseling, and referrals were provided as needed. After Visit Summary printed and given to patient which includes a list of additional screenings\tests needed.    Follow-up in 3 weeks (on 3/14/2018) for follow-up with PCP, HRA visit in 1 year.    Allison Weber NP

## 2018-02-26 ENCOUNTER — PATIENT MESSAGE (OUTPATIENT)
Dept: PODIATRY | Facility: CLINIC | Age: 68
End: 2018-02-26

## 2018-02-26 DIAGNOSIS — E11.42 DIABETIC POLYNEUROPATHY ASSOCIATED WITH TYPE 2 DIABETES MELLITUS: ICD-10-CM

## 2018-02-27 RX ORDER — GABAPENTIN 300 MG/1
CAPSULE ORAL
Qty: 90 CAPSULE | Refills: 0 | Status: SHIPPED | OUTPATIENT
Start: 2018-02-27 | End: 2018-03-21 | Stop reason: SDUPTHER

## 2018-03-01 ENCOUNTER — PATIENT OUTREACH (OUTPATIENT)
Dept: OTHER | Facility: OTHER | Age: 68
End: 2018-03-01

## 2018-03-01 NOTE — PROGRESS NOTES
Last 5 Patient Entered Readings                                      Current 30 Day Average: 131/66     Recent Readings 2/28/2018 2/27/2018 2/26/2018 2/25/2018 2/22/2018    SBP (mmHg) 133 135 130 106 127    DBP (mmHg) 69 69 68 63 64    Pulse 71 73 62 65 72        Digital Medicine: Health  Follow Up    Lifestyle Modifications:    1.Dietary Modifications (Sodium intake <2,000mg/day, food labels, dining out): Patient reports that she is trying to limit her salt intake, even though she continues to dine out daily. Reviewed tips to reduce sodium when dining out.    2.Physical Activity: Patient still is not exercising. She is not ready to start an exercise routine at this time, but reports that she has a few vacations planned and should be doing more walking. Encouragement provided.    3.Medication Therapy: Patient has been compliant with the medication regimen.    4.Patient has the following medication side effects/concerns: NA  (Frequency/Alleviating factors/Precipitating factors, etc.)     Follow up with Ms. Vivienne Jose completed. Ms. Jose is doing well. She is happy with her BP since increasing medication back in January. No further questions or concerns. I will follow up in a few weeks to assess progress.

## 2018-03-02 ENCOUNTER — CLINICAL SUPPORT (OUTPATIENT)
Dept: INTERNAL MEDICINE | Facility: CLINIC | Age: 68
End: 2018-03-02
Payer: MEDICARE

## 2018-03-02 DIAGNOSIS — J30.1 SEASONAL ALLERGIC RHINITIS DUE TO POLLEN, UNSPECIFIED CHRONICITY: ICD-10-CM

## 2018-03-02 PROCEDURE — 99499 UNLISTED E&M SERVICE: CPT | Mod: S$GLB,,, | Performed by: ALLERGY & IMMUNOLOGY

## 2018-03-02 PROCEDURE — 95115 IMMUNOTHERAPY ONE INJECTION: CPT | Mod: S$GLB,,, | Performed by: INTERNAL MEDICINE

## 2018-03-02 NOTE — PROGRESS NOTES
Patient here for allergy injection.  No new meds, no problems with last injection.  Patient has new vial.    0.5 mL of Red 1:1 given SQ in upper Left arm.  Tolerated well.    Site checked after 30 minutes,1+ reaction of erythema noted.    No complaints voiced.

## 2018-03-08 ENCOUNTER — OFFICE VISIT (OUTPATIENT)
Dept: UROLOGY | Facility: CLINIC | Age: 68
End: 2018-03-08
Payer: MEDICARE

## 2018-03-08 VITALS
HEART RATE: 79 BPM | BODY MASS INDEX: 45.07 KG/M2 | WEIGHT: 244.94 LBS | SYSTOLIC BLOOD PRESSURE: 139 MMHG | DIASTOLIC BLOOD PRESSURE: 61 MMHG | HEIGHT: 62 IN

## 2018-03-08 DIAGNOSIS — N95.2 VAGINAL ATROPHY: ICD-10-CM

## 2018-03-08 DIAGNOSIS — N18.30 TYPE 2 DIABETES MELLITUS WITH STAGE 3 CHRONIC KIDNEY DISEASE, WITH LONG-TERM CURRENT USE OF INSULIN: ICD-10-CM

## 2018-03-08 DIAGNOSIS — Z79.4 TYPE 2 DIABETES MELLITUS WITH STAGE 3 CHRONIC KIDNEY DISEASE, WITH LONG-TERM CURRENT USE OF INSULIN: ICD-10-CM

## 2018-03-08 DIAGNOSIS — E11.22 TYPE 2 DIABETES MELLITUS WITH STAGE 3 CHRONIC KIDNEY DISEASE, WITH LONG-TERM CURRENT USE OF INSULIN: ICD-10-CM

## 2018-03-08 DIAGNOSIS — N39.0 RECURRENT UTI: Primary | ICD-10-CM

## 2018-03-08 PROCEDURE — 87086 URINE CULTURE/COLONY COUNT: CPT

## 2018-03-08 PROCEDURE — 87088 URINE BACTERIA CULTURE: CPT

## 2018-03-08 PROCEDURE — 99213 OFFICE O/P EST LOW 20 MIN: CPT | Mod: 25,S$GLB,, | Performed by: UROLOGY

## 2018-03-08 PROCEDURE — 87186 SC STD MICRODIL/AGAR DIL: CPT

## 2018-03-08 PROCEDURE — 99499 UNLISTED E&M SERVICE: CPT | Mod: S$GLB,,, | Performed by: UROLOGY

## 2018-03-08 PROCEDURE — 99999 PR PBB SHADOW E&M-EST. PATIENT-LVL III: CPT | Mod: PBBFAC,,, | Performed by: UROLOGY

## 2018-03-08 PROCEDURE — 87077 CULTURE AEROBIC IDENTIFY: CPT

## 2018-03-08 PROCEDURE — 81002 URINALYSIS NONAUTO W/O SCOPE: CPT | Mod: S$GLB,,, | Performed by: UROLOGY

## 2018-03-08 PROCEDURE — 51701 INSERT BLADDER CATHETER: CPT | Mod: S$GLB,,, | Performed by: UROLOGY

## 2018-03-08 PROCEDURE — 3078F DIAST BP <80 MM HG: CPT | Mod: S$GLB,,, | Performed by: UROLOGY

## 2018-03-08 PROCEDURE — 3075F SYST BP GE 130 - 139MM HG: CPT | Mod: S$GLB,,, | Performed by: UROLOGY

## 2018-03-08 RX ORDER — NITROFURANTOIN 25; 75 MG/1; MG/1
100 CAPSULE ORAL 2 TIMES DAILY
Qty: 14 CAPSULE | Refills: 0 | Status: SHIPPED | OUTPATIENT
Start: 2018-03-08 | End: 2018-03-15

## 2018-03-08 NOTE — PROGRESS NOTES
CHIEF COMPLAINT:    Mrs. Jose is a 67 y.o. female presenting for a follow up on recurrent UTI, history of IC.    PRESENTING ILLNESS:    Vivienne Jose is a 67 y.o. female who returns today stating that she is OK.  She continues to have recurrent UTI.  Presently, her symptoms include a soreness that is deep within the pelvis, malodorous urine, slight dysuria and her blood glucose has been difficult to control, in spite of a concerted effort in dietary compliance.  No fevers or chills.  This is not as bad as they can get with significant dysuria and suprapubic tenderness.     Bowels are normal  Uses Premarin cream      REVIEW OF SYSTEMS:    Review of Systems   Constitutional: Negative.    HENT: Negative.    Eyes: Negative.    Respiratory: Negative.    Cardiovascular: Negative.    Gastrointestinal: Negative.    Genitourinary: Positive for dysuria.   Musculoskeletal: Positive for back pain and joint pain.   Skin: Negative.    Neurological: Negative.    Endo/Heme/Allergies: Negative.    Psychiatric/Behavioral: Negative.          PATIENT HISTORY:    Past Medical History:   Diagnosis Date    Allergy     Angio-edema     Arthritis     Cataract     Cerebrovascular malformation     Colon polyps     Coronary artery disease     Diabetes mellitus, type 2     Diabetic peripheral neuropathy     GERD (gastroesophageal reflux disease)     Herpes infection     Hyperlipidemia     Hypertension     Hypothyroidism     Kidney stones     Nausea & vomiting 11/23/2015    Obesity, morbid     Ovarian cyst     Pyelonephritis, chronic     Seizure disorder, focal motor     Sleep apnea     Type II or unspecified type diabetes mellitus with neurological manifestations, uncontrolled(250.62)     Urinary tract infection     Urticaria     Vaginal infection        Past Surgical History:   Procedure Laterality Date    CARPAL TUNNEL RELEASE Right 2014    COLONOSCOPY      COLONOSCOPY N/A 9/13/2017    Procedure: COLONOSCOPY  Nalini;  Surgeon: Gisela Wall MD;  [facility];  Service: Endoscopy;  Laterality: N/A;    CYST REMOVAL      skin; multiples    EXTRACORPOREAL SHOCK WAVE LITHOTRIPSY  2002    HYSTERECTOMY  1984    KIDNEY STONE SURGERY      THYROIDECTOMY  1977         TONSILLECTOMY, ADENOIDECTOMY      TRIGGER FINGER RELEASE      TUBAL LIGATION         Family History   Problem Relation Age of Onset    Cancer Father     Arthritis Father     Gout Father     Allergies Son     Allergic rhinitis Son     Skin cancer Mother     Macular degeneration Mother     Dementia Mother     Hypertension Mother     No Known Problems Daughter     Diabetes Daughter     No Known Problems Daughter     Glaucoma Maternal Aunt      Great Maternal Aunt    Leukemia Maternal Uncle      Social History    Marital status:      Occupational History    retired [facility] [employer]     Social History Main Topics    Smoking status: Never Smoker    Smokeless tobacco: Never Used    Alcohol use No    Drug use: No    Sexual activity: Yes     Partners: Male       Allergies:  Sulfa (sulfonamide antibiotics); Ciprofloxacin; Januvia [sitagliptin]; Lisinopril; Lotensin [benazepril]; and Nexium [esomeprazole magnesium]    Medications:  Outpatient Encounter Prescriptions as of 3/8/2018   Medication Sig Dispense Refill    amlodipine (NORVASC) 5 MG tablet TAKE 1 TABLET EVERY DAY 90 tablet 3    aspirin (ECOTRIN) 81 MG EC tablet Take 81 mg by mouth once daily.      azelastine (ASTELIN) 137 mcg (0.1 %) nasal spray 2 sprays (274 mcg total) by Nasal route 2 (two) times daily. 90 mL 4    biotin 10,000 mcg Cap Take by mouth.      blood sugar diagnostic (TRUE METRIX GLUCOSE TEST STRIP) Strp TEST TWO TIMES DAILY 200 strip 6    blood-glucose meter Misc [employer] True Metrix Air meter 1 each 0    calcium carbonate-vitamin D3 600 mg(1,500mg) -100 unit Cap Take 1 tablet by mouth once daily.      chlorthalidone (HYGROTEN) 25 MG Tab Take  "1 tablet (25 mg total) by mouth once daily. 30 tablet 1    econazole nitrate 1 % cream       fluticasone (FLONASE) 50 mcg/actuation nasal spray 2 sprays (100 mcg total) by Each Nare route once daily. 48 g 4    gabapentin (NEURONTIN) 300 MG capsule TAKE 1 CAPSULE EVERY EVENING 90 capsule 0    glucagon (human recombinant) inj 1mg/mL kit Inject 1 mL (1 mg total) into the muscle as needed. 1 kit 6    insulin detemir (LEVEMIR FLEXTOUCH) 100 unit/mL (3 mL) SubQ InPn pen Inject 14 Units into the skin every evening. 1 Box 6    ketotifen (ZADITOR) 0.025 % (0.035 %) ophthalmic solution Place 1-2 drops into both eyes once daily.      L.acid-B.bifidum-B.animal-FOS (PROBIOTIC COMPLEX) 25 billion cell -100 mg Cap Take 1 capsule by mouth once daily.      lancets (TRUEPLUS LANCETS) 28 gauge Misc Inject 1 lancet into the skin 2 (two) times daily before meals. 200 each 6    levothyroxine (SYNTHROID) 75 MCG tablet TAKE 1 TABLET ONE TIME DAILY 90 tablet 1    liraglutide 0.6 mg/0.1 mL, 18 mg/3 mL, subq PNIJ (VICTOZA 3-TAWANA) 0.6 mg/0.1 mL (18 mg/3 mL) PnIj INJECT 1.8 MG INTO THE SKIN ONCE DAILY. 27 mL 11    loratadine (CLARITIN) 10 mg tablet Take 10 mg by mouth once daily.      lovastatin (MEVACOR) 40 MG tablet Take 1 tablet (40 mg total) by mouth nightly. 90 tablet 1    magnesium oxide-Mg AA chelate (MAGNESIUM, AMINO ACID CHELATE,) 133 mg Tab Take by mouth as directed.       metFORMIN (GLUCOPHAGE) 1000 MG tablet Take 1 tablet (1,000 mg total) by mouth 2 (two) times daily with meals. 180 tablet 3    omeprazole (PRILOSEC) 20 MG capsule       pen needle, diabetic (BD ULTRA-FINE EDUARDO PEN NEEDLES) 32 gauge x 5/32" Ndle USE WITH VICTOZA AND LEVEMIR (2 INJECTIONS EVERY DAY) 100 each 3    PREMARIN vaginal cream       repaglinide (PRANDIN) 1 MG tablet Take 1 tablet (1 mg total) by mouth 3 (three) times daily before meals. 270 tablet 3    silver sulfADIAZINE 1% (SILVADENE) 1 % cream Apply topically 2 (two) times daily. 25 g 1 "    valacyclovir (VALTREX) 500 MG tablet TAKE 1 TABLET EVERY DAY 90 tablet 1    valsartan (DIOVAN) 160 MG tablet TAKE 1 TABLET TWICE DAILY 180 tablet 1    diclofenac sodium (VOLTAREN) 1 % Gel Apply 2 g topically 4 (four) times daily. 3 Tube 3    nitrofurantoin, macrocrystal-monohydrate, (MACROBID) 100 MG capsule Take 1 capsule (100 mg total) by mouth 2 (two) times daily. 14 capsule 0     No facility-administered encounter medications on file as of 3/8/2018.          PHYSICAL EXAMINATION:    The patient generally appears in good health, is appropriately interactive, and is in no apparent distress.    Skin: No lesions.    Mental: Cooperative with normal affect.    Neuro: Grossly intact.    HEENT: Normal. No evidence of lymphadenopathy.    Chest:  normal inspiratory effort.    Abdomen:  Soft, non-tender. No masses or organomegaly. Bladder is not palpable. No evidence of flank discomfort. No evidence of inguinal hernia.    Extremities: No clubbing, cyanosis, or edema    Normal external female genitalia  Grade II urogenital atrophy  Urethral meatus is normal  Urethra and bladder are nontender to bimanual exam  Well supported anteriorly and posteriorly   Uterus and cervix are surgically absent  No adnexal masses  PVR by catheterization was 40 ml    LABS:    Lab Results   Component Value Date    BUN 17 02/01/2018    CREATININE 1.0 02/01/2018     UA 1.005, pH 7, + leuk, ++nitrite, otherwise, negative    IMPRESSION:    Encounter Diagnoses   Name Primary?    Recurrent UTI Yes    Type 2 diabetes mellitus with stage 3 chronic kidney disease, with long-term current use of insulin     Vaginal atrophy        PLAN:    1.  The catheterized specimen was sent for culture  2.  Macrobid sent to [facility] pharmacy

## 2018-03-10 LAB — BACTERIA UR CULT: NORMAL

## 2018-03-12 ENCOUNTER — TELEPHONE (OUTPATIENT)
Dept: UROLOGY | Facility: CLINIC | Age: 68
End: 2018-03-12

## 2018-03-14 ENCOUNTER — OFFICE VISIT (OUTPATIENT)
Dept: INTERNAL MEDICINE | Facility: CLINIC | Age: 68
End: 2018-03-14
Payer: MEDICARE

## 2018-03-14 VITALS
HEART RATE: 68 BPM | DIASTOLIC BLOOD PRESSURE: 64 MMHG | HEIGHT: 62 IN | BODY MASS INDEX: 45.03 KG/M2 | WEIGHT: 244.69 LBS | SYSTOLIC BLOOD PRESSURE: 119 MMHG | OXYGEN SATURATION: 98 %

## 2018-03-14 DIAGNOSIS — G47.33 OSA ON CPAP: ICD-10-CM

## 2018-03-14 DIAGNOSIS — I15.2 HYPERTENSION ASSOCIATED WITH DIABETES: ICD-10-CM

## 2018-03-14 DIAGNOSIS — E11.59 HYPERTENSION ASSOCIATED WITH DIABETES: ICD-10-CM

## 2018-03-14 DIAGNOSIS — E66.01 MORBID OBESITY WITH BMI OF 40.0-44.9, ADULT: ICD-10-CM

## 2018-03-14 DIAGNOSIS — E03.9 HYPOTHYROIDISM, UNSPECIFIED TYPE: ICD-10-CM

## 2018-03-14 DIAGNOSIS — M25.561 CHRONIC PAIN OF RIGHT KNEE: ICD-10-CM

## 2018-03-14 DIAGNOSIS — G89.29 CHRONIC PAIN OF RIGHT KNEE: ICD-10-CM

## 2018-03-14 DIAGNOSIS — R79.89 LOW VITAMIN D LEVEL: ICD-10-CM

## 2018-03-14 PROCEDURE — 99214 OFFICE O/P EST MOD 30 MIN: CPT | Mod: S$GLB,,, | Performed by: INTERNAL MEDICINE

## 2018-03-14 PROCEDURE — 3078F DIAST BP <80 MM HG: CPT | Mod: CPTII,S$GLB,, | Performed by: INTERNAL MEDICINE

## 2018-03-14 PROCEDURE — 99999 PR PBB SHADOW E&M-EST. PATIENT-LVL III: CPT | Mod: PBBFAC,,, | Performed by: INTERNAL MEDICINE

## 2018-03-14 PROCEDURE — 99499 UNLISTED E&M SERVICE: CPT | Mod: S$GLB,,, | Performed by: INTERNAL MEDICINE

## 2018-03-14 PROCEDURE — 3074F SYST BP LT 130 MM HG: CPT | Mod: CPTII,S$GLB,, | Performed by: INTERNAL MEDICINE

## 2018-03-14 NOTE — PROGRESS NOTES
Pt. ID: Vivienne Jose is a 67 y.o. female      Chief complaint:   Chief Complaint   Patient presents with    Diabetes     follow up       HPI: Pt. Here for f/u for DM and HTN; I reviewed labs dated 1/10/18; HGBA1C was 7.1; she sees endocrine and she is on levimeer 14 units QD; she states she will attempt stricter ADA diet; TSH is WNL; vitamin D was low and she was told to take 2000 units QD but is not taking that dose and she will increase; she has lost a few pounds; of note she sees ortho for chronic R knee pain and is scheduled for localized steroid shot; she is taking asa 81 mg QD      Review of Systems   Constitutional: Positive for malaise/fatigue. Negative for chills and fever.   Eyes: Negative for blurred vision.   Respiratory: Negative for shortness of breath.    Cardiovascular: Negative for chest pain, palpitations, orthopnea and PND.   Musculoskeletal: Positive for joint pain. Negative for neck pain.        R knee pain which is worse with ROM; pt. Sees ortho   Neurological: Negative for headaches.         Objective:    Physical Exam   Constitutional: She is oriented to person, place, and time.   Morbid obesity   Eyes: EOM are normal.   Neck: Normal range of motion.   Cardiovascular: Normal rate, regular rhythm and normal heart sounds.    Pulmonary/Chest: Effort normal and breath sounds normal.   Abdominal: Soft. There is no tenderness. There is no rebound and no guarding.   Musculoskeletal:   R knee pain with ROM/ambulation   Neurological: She is alert and oriented to person, place, and time.   Skin: No rash noted.   Vitals reviewed.        Health Maintenance   Topic Date Due    Eye Exam  05/04/2018    Hemoglobin A1c  07/10/2018    Lipid Panel  09/08/2018    Foot Exam  12/11/2018    Mammogram  08/04/2019    Pneumococcal (65+) (2 of 2 - PPSV23) 04/28/2020    DEXA SCAN  01/05/2021    TETANUS VACCINE  06/19/2024    Colonoscopy  09/13/2027    Hepatitis C Screening  Completed    Zoster Vaccine   Completed    Influenza Vaccine  Completed         Assessment:     1. Uncontrolled type 2 diabetes mellitus without complication, without long-term current use of insulin Sub-optimally controlled   2. Hypertension associated with diabetes Well controlled   3. Hypothyroidism, unspecified type Well controlled   4. Chronic pain of right knee Active   5. BAM on CPAP Well controlled   6. Low vitamin D level Active   7. Morbid obesity with BMI of 40.0-44.9, adult Sub-optimally controlled         Plan: Uncontrolled type 2 diabetes mellitus without complication, without long-term current use of insulin  Comments:  pt. will attempt stricter ADA diet and continue current regimen; pt. is scheduled to f/u next month with endocrine with HGBA1C and will have her manage  Orders:  -     CBC auto differential; Future; Expected date: 06/14/2018  -     Comprehensive metabolic panel; Future; Expected date: 06/14/2018  -     Hemoglobin A1c; Future; Expected date: 06/14/2018    Hypertension associated with diabetes  Comments:  continue current regimen and encouraged low Na diet and weight loss  Orders:  -     CBC auto differential; Future; Expected date: 06/14/2018  -     Comprehensive metabolic panel; Future; Expected date: 06/14/2018    Hypothyroidism, unspecified type  Comments:  continue current regimen     Chronic pain of right knee  Comments:  continue current regimen and f/u ortho who is managing     BAM on CPAP    Low vitamin D level  Comments:  asked pt. to increase vitamin D to 2000 units and repeat vitamin D level on f/u in 4 months  Orders:  -     Vitamin D; Future; Expected date: 06/14/2018    Morbid obesity with BMI of 40.0-44.9, adult  Comments:  encouraged diet and explained risks         Problem List Items Addressed This Visit        Cardiac/Vascular    Hypertension associated with diabetes    Relevant Orders    CBC auto differential    Comprehensive metabolic panel       Endocrine    Hypothyroidism due to acquired  atrophy of thyroid    Type 2 diabetes mellitus, uncontrolled - Primary    Relevant Orders    CBC auto differential    Comprehensive metabolic panel    Hemoglobin A1c    Low vitamin D level    Relevant Orders    Vitamin D       Other    BAM on CPAP      Other Visit Diagnoses     Chronic pain of right knee   (Active)      continue current regimen and f/u ortho who is managing     Morbid obesity with BMI of 40.0-44.9, adult   (Sub-optimally controlled)      encouraged diet and explained risks

## 2018-03-19 ENCOUNTER — OFFICE VISIT (OUTPATIENT)
Dept: ORTHOPEDICS | Facility: CLINIC | Age: 68
End: 2018-03-19
Payer: MEDICARE

## 2018-03-19 VITALS — HEIGHT: 62 IN | WEIGHT: 244.69 LBS | BODY MASS INDEX: 45.03 KG/M2

## 2018-03-19 DIAGNOSIS — M17.11 PRIMARY OSTEOARTHRITIS OF RIGHT KNEE: Primary | ICD-10-CM

## 2018-03-19 PROCEDURE — 99213 OFFICE O/P EST LOW 20 MIN: CPT | Mod: 25,S$GLB,, | Performed by: PHYSICIAN ASSISTANT

## 2018-03-19 PROCEDURE — 20610 DRAIN/INJ JOINT/BURSA W/O US: CPT | Mod: RT,S$GLB,, | Performed by: PHYSICIAN ASSISTANT

## 2018-03-19 PROCEDURE — 99999 PR PBB SHADOW E&M-EST. PATIENT-LVL IV: CPT | Mod: PBBFAC,,, | Performed by: PHYSICIAN ASSISTANT

## 2018-03-19 RX ORDER — TRIAMCINOLONE ACETONIDE 40 MG/ML
40 INJECTION, SUSPENSION INTRA-ARTICULAR; INTRAMUSCULAR
Status: COMPLETED | OUTPATIENT
Start: 2018-03-19 | End: 2018-03-19

## 2018-03-19 RX ADMIN — TRIAMCINOLONE ACETONIDE 40 MG: 40 INJECTION, SUSPENSION INTRA-ARTICULAR; INTRAMUSCULAR at 11:03

## 2018-03-19 NOTE — PROGRESS NOTES
CC:  Knee pain    HX:  Vivienne Jose returns for re-evaluation of right knee arthritis. She was last seen by me 11/28/2017.  Today she is doing well but notes she is here for another steroid injection.  She has a known history of osteoarthritis of the right knee. She localizes the pain medial and describes the pain as achy. She has tried NSAID's.  She has tried prior injection therapy.     PE:  On physical examination there is not an effusion in the knee.  The knee is ligamentally stable to varus/valgus stress.  There is no warmth or erythema. There is no specific pain over the pes anserine bursa. ROM is 0 to 120.    ROM of the hip does not reproduce pain.  There is no significant distal edema, and there is no calf tenderness to palpation.     Impression:  Diagnoses and all orders for this visit:    Primary osteoarthritis of right knee    Other orders  -     triamcinolone acetonide injection 40 mg; Inject 1 mL (40 mg total) into the articular space one time.          Plan:  The conservative options including NSAIDs, activity modification, physical therapy, corticosteroid injection, and viscosupplimentation were discussed. She is not interested in surgical intervention at this time.    PROCEDURE:  I have explained the risks, benefits, and alternatives of the procedure in detail.  The patient voices understanding and all questions have been answered.  The patient agrees to proceed as planned. So after I performed a sterile prep of the skin in the normal fashion the right knee is injected using a 22 gauge needle from the anterolateral approach with a combination of 4cc 1% plain lidocaine and 40 mg of kenalog.  The patient is cautioned an immediate relief of pain is secondary to the local anesthetic and will be temporary.  After the anesthetic wears off there may be a increase in pain that may last for a few hours or a few days and they should use ice to help alleviate this flair up of pain.

## 2018-03-20 ENCOUNTER — PATIENT MESSAGE (OUTPATIENT)
Dept: INTERNAL MEDICINE | Facility: CLINIC | Age: 68
End: 2018-03-20

## 2018-03-21 ENCOUNTER — PATIENT OUTREACH (OUTPATIENT)
Dept: OTHER | Facility: OTHER | Age: 68
End: 2018-03-21

## 2018-03-21 DIAGNOSIS — E11.42 DIABETIC POLYNEUROPATHY ASSOCIATED WITH TYPE 2 DIABETES MELLITUS: ICD-10-CM

## 2018-03-21 DIAGNOSIS — B00.9 RECURRENT HSV (HERPES SIMPLEX VIRUS): ICD-10-CM

## 2018-03-21 NOTE — PROGRESS NOTES
Last 5 Patient Entered Readings                                      Current 30 Day Average: 130/66     Recent Readings 3/20/2018 3/19/2018 3/19/2018 3/18/2018 3/17/2018    SBP (mmHg) 135 140 142 126 133    DBP (mmHg) 67 72 71 61 63    Pulse 84 91 92 76 71        Ms. Jose's BP has started to improve. She had a spike in BP after receiving a steroid shot for knee pain. She denies any changes in physical activity. Explained that it could just be due to the higher dose of chlorthalidone and she's starting to see the results. She feels well. She will be traveling to visit her children for about 3 weeks in April. Will place her on hiatus during this time.     Patient's BP average is controlled based on 2017 ACC/AHA HTN guidelines of goal BP <130/80.      Patient's health , Maria Luisa Dior, will be following up every 3-4 weeks. I will continue to monitor regularly and will follow up in 2-3 months, sooner if BP begins to trend upward or downward.    Patient has my contact information and knows to call with any concerns or clinical changes.     Current HTN regimen:  Hypertension Medications             amlodipine (NORVASC) 5 MG tablet TAKE 1 TABLET EVERY DAY    chlorthalidone (HYGROTEN) 25 MG Tab Take 1 tablet (25 mg total) by mouth once daily.    valsartan (DIOVAN) 160 MG tablet TAKE 1 TABLET TWICE DAILY

## 2018-03-27 ENCOUNTER — PATIENT MESSAGE (OUTPATIENT)
Dept: INTERNAL MEDICINE | Facility: CLINIC | Age: 68
End: 2018-03-27

## 2018-03-28 DIAGNOSIS — B00.9 RECURRENT HSV (HERPES SIMPLEX VIRUS): ICD-10-CM

## 2018-03-28 RX ORDER — VALACYCLOVIR HYDROCHLORIDE 500 MG/1
TABLET, FILM COATED ORAL
Qty: 90 TABLET | Refills: 1 | OUTPATIENT
Start: 2018-03-28

## 2018-03-28 RX ORDER — VALACYCLOVIR HYDROCHLORIDE 500 MG/1
500 TABLET, FILM COATED ORAL DAILY
Qty: 90 TABLET | Refills: 1 | Status: SHIPPED | OUTPATIENT
Start: 2018-03-28 | End: 2019-02-03 | Stop reason: SDUPTHER

## 2018-03-28 RX ORDER — OMEPRAZOLE 20 MG/1
CAPSULE, DELAYED RELEASE ORAL
Qty: 90 CAPSULE | Refills: 1 | OUTPATIENT
Start: 2018-03-28

## 2018-03-28 RX ORDER — GABAPENTIN 300 MG/1
CAPSULE ORAL
Qty: 90 CAPSULE | Refills: 1 | Status: SHIPPED | OUTPATIENT
Start: 2018-03-28 | End: 2018-07-16 | Stop reason: SDUPTHER

## 2018-03-28 RX ORDER — OMEPRAZOLE 20 MG/1
20 CAPSULE, DELAYED RELEASE ORAL DAILY PRN
Qty: 90 CAPSULE | Refills: 1 | Status: SHIPPED | OUTPATIENT
Start: 2018-03-28 | End: 2019-05-13 | Stop reason: SDUPTHER

## 2018-03-28 NOTE — TELEPHONE ENCOUNTER
----- Message from Ruth Tierney sent at 3/27/2018  1:08 PM CDT -----  Contact: Self [phone]  Patient Returning Your Phone Call

## 2018-03-28 NOTE — TELEPHONE ENCOUNTER
Please review your email, and clarify to pt. That my question related to valcyclovir is if she is taking daily, so I can refill with daily dosing. I will also refill her prilosec.

## 2018-04-02 ENCOUNTER — OFFICE VISIT (OUTPATIENT)
Dept: PODIATRY | Facility: CLINIC | Age: 68
End: 2018-04-02
Payer: MEDICARE

## 2018-04-02 ENCOUNTER — LAB VISIT (OUTPATIENT)
Dept: LAB | Facility: HOSPITAL | Age: 68
End: 2018-04-02
Attending: INTERNAL MEDICINE
Payer: MEDICARE

## 2018-04-02 ENCOUNTER — CLINICAL SUPPORT (OUTPATIENT)
Dept: INTERNAL MEDICINE | Facility: CLINIC | Age: 68
End: 2018-04-02
Payer: MEDICARE

## 2018-04-02 VITALS
WEIGHT: 244 LBS | SYSTOLIC BLOOD PRESSURE: 124 MMHG | DIASTOLIC BLOOD PRESSURE: 69 MMHG | BODY MASS INDEX: 44.9 KG/M2 | HEIGHT: 62 IN | HEART RATE: 67 BPM

## 2018-04-02 DIAGNOSIS — E11.9 TYPE 2 DIABETES MELLITUS WITHOUT COMPLICATION, WITH LONG-TERM CURRENT USE OF INSULIN: ICD-10-CM

## 2018-04-02 DIAGNOSIS — I15.2 HYPERTENSION ASSOCIATED WITH DIABETES: ICD-10-CM

## 2018-04-02 DIAGNOSIS — E11.59 HYPERTENSION ASSOCIATED WITH DIABETES: ICD-10-CM

## 2018-04-02 DIAGNOSIS — E89.0 POST-SURGICAL HYPOTHYROIDISM: ICD-10-CM

## 2018-04-02 DIAGNOSIS — J30.1 CHRONIC ALLERGIC RHINITIS DUE TO POLLEN, UNSPECIFIED SEASONALITY: ICD-10-CM

## 2018-04-02 DIAGNOSIS — G47.33 OSA ON CPAP: ICD-10-CM

## 2018-04-02 DIAGNOSIS — T14.90XA CLOSED WOUND: ICD-10-CM

## 2018-04-02 DIAGNOSIS — E55.9 VITAMIN D DEFICIENCY: ICD-10-CM

## 2018-04-02 DIAGNOSIS — Z79.4 CONTROLLED TYPE 2 DIABETES MELLITUS WITHOUT COMPLICATION, WITH LONG-TERM CURRENT USE OF INSULIN: Primary | ICD-10-CM

## 2018-04-02 DIAGNOSIS — E11.9 TYPE 2 DIABETES MELLITUS WITHOUT RETINOPATHY: ICD-10-CM

## 2018-04-02 DIAGNOSIS — E78.5 DYSLIPIDEMIA ASSOCIATED WITH TYPE 2 DIABETES MELLITUS: ICD-10-CM

## 2018-04-02 DIAGNOSIS — E11.69 DYSLIPIDEMIA ASSOCIATED WITH TYPE 2 DIABETES MELLITUS: ICD-10-CM

## 2018-04-02 DIAGNOSIS — Z79.4 TYPE 2 DIABETES MELLITUS WITHOUT COMPLICATION, WITH LONG-TERM CURRENT USE OF INSULIN: ICD-10-CM

## 2018-04-02 DIAGNOSIS — E11.42 DIABETIC POLYNEUROPATHY ASSOCIATED WITH TYPE 2 DIABETES MELLITUS: ICD-10-CM

## 2018-04-02 DIAGNOSIS — E11.9 CONTROLLED TYPE 2 DIABETES MELLITUS WITHOUT COMPLICATION, WITH LONG-TERM CURRENT USE OF INSULIN: Primary | ICD-10-CM

## 2018-04-02 DIAGNOSIS — E66.01 MORBID OBESITY WITH BMI OF 45.0-49.9, ADULT: ICD-10-CM

## 2018-04-02 DIAGNOSIS — E03.9 HYPOTHYROIDISM, UNSPECIFIED TYPE: ICD-10-CM

## 2018-04-02 LAB
25(OH)D3+25(OH)D2 SERPL-MCNC: 31 NG/ML
ESTIMATED AVG GLUCOSE: 163 MG/DL
HBA1C MFR BLD HPLC: 7.3 %
TSH SERPL DL<=0.005 MIU/L-ACNC: 3.49 UIU/ML

## 2018-04-02 PROCEDURE — 3078F DIAST BP <80 MM HG: CPT | Mod: CPTII,S$GLB,, | Performed by: PODIATRIST

## 2018-04-02 PROCEDURE — 99999 PR PBB SHADOW E&M-EST. PATIENT-LVL IV: CPT | Mod: PBBFAC,,, | Performed by: PODIATRIST

## 2018-04-02 PROCEDURE — 95115 IMMUNOTHERAPY ONE INJECTION: CPT | Mod: S$GLB,,, | Performed by: FAMILY MEDICINE

## 2018-04-02 PROCEDURE — 99499 UNLISTED E&M SERVICE: CPT | Mod: S$GLB,,, | Performed by: ALLERGY & IMMUNOLOGY

## 2018-04-02 PROCEDURE — 83036 HEMOGLOBIN GLYCOSYLATED A1C: CPT

## 2018-04-02 PROCEDURE — 84443 ASSAY THYROID STIM HORMONE: CPT

## 2018-04-02 PROCEDURE — 3074F SYST BP LT 130 MM HG: CPT | Mod: CPTII,S$GLB,, | Performed by: PODIATRIST

## 2018-04-02 PROCEDURE — 3045F PR MOST RECENT HEMOGLOBIN A1C LEVEL 7.0-9.0%: CPT | Mod: CPTII,S$GLB,, | Performed by: PODIATRIST

## 2018-04-02 PROCEDURE — 99212 OFFICE O/P EST SF 10 MIN: CPT | Mod: S$GLB,,, | Performed by: PODIATRIST

## 2018-04-02 PROCEDURE — 82306 VITAMIN D 25 HYDROXY: CPT

## 2018-04-02 PROCEDURE — 99499 UNLISTED E&M SERVICE: CPT | Mod: S$GLB,,, | Performed by: PODIATRIST

## 2018-04-02 PROCEDURE — 36415 COLL VENOUS BLD VENIPUNCTURE: CPT | Mod: PO

## 2018-04-02 NOTE — PATIENT INSTRUCTIONS
Consider taking 1 Glucerna shake a day to increase your protein level in the body. Make sure you drink plenty of water during the day.

## 2018-04-02 NOTE — PROGRESS NOTES
"Patient here for allergy injection.  No new meds, no problems with last injection.    0.5 mL of Red 1:1 given SQ in upper Left arm.  Tolerated well.    Site checked after 30 minutes,1+ reaction of erythema and wheal noted.  Pt states it feels a little "tight."    No other complaints voiced.    "

## 2018-04-03 NOTE — PROGRESS NOTES
Subjective:      Patient ID: Vivienne Jose is a 67 y.o. female.    Chief Complaint: Follow-up (bilateral hallux removal)    Vivienne is a 67 y.o. female who presents to the clinic for evaluation and treatment of high risk feet.  Vivienne has a past medical history of Allergy; Angio-edema; Arthritis; Cataract; Cerebrovascular malformation; Colon polyps; Coronary artery disease; Diabetes mellitus, type 2; Diabetic peripheral neuropathy; GERD (gastroesophageal reflux disease); Herpes infection; Hyperlipidemia; Hypertension; Hypothyroidism; Kidney stones; Nausea & vomiting (11/23/2015); Obesity, morbid; Ovarian cyst; Pyelonephritis, chronic; Seizure disorder, focal motor; Sleep apnea; Type II or unspecified type diabetes mellitus with neurological manifestations, uncontrolled(250.62); Urinary tract infection; Urticaria; and Vaginal infection.  This patient has documented high risk feet requiring routine maintenance secondary to diabetes mellitis and those secondary complications of diabetes, as mentioned.  Complains of redness, pain and swelling left big toe present for past several days. Denies trauma. Says she saw puss come out the distal tip of the toe as she was trimming the nail back.     9/27/16: Follow up from nail avulsion left hallux. Wound culture grew skin geronimo. Applied gentamicin for a few days but stopped due to oozing. Relates only slight pain when she touches it.     11/17/17: Returns to discuss further treatment of her painful big toenails. Accompanied by her .    12/1/17: Returns for bilateral hallux P&A matrixectomy. No new complaints. Accompanied by her .    12/11/17: Post P&A total nail matrixectomy bilateral. Relates mild discomfort. Applying silvadene cream twice a day.    4/2/18: Returns for a check up. Relates her big toe wounds healed very slowly. No new complaints. No pain.    PCP: Jay Jay Felton MD    Date Last Seen by PCP: 9/14/17  Current shoe gear:  Extra depth shoes    Hemoglobin  A1C   Date Value Ref Range Status   04/02/2018 7.3 (H) 4.0 - 5.6 % Final     Comment:     According to ADA guidelines, hemoglobin A1c <7.0% represents  optimal control in non-pregnant diabetic patients. Different  metrics may apply to specific patient populations.   Standards of Medical Care in Diabetes-2016.  For the purpose of screening for the presence of diabetes:  <5.7%     Consistent with the absence of diabetes  5.7-6.4%  Consistent with increasing risk for diabetes   (prediabetes)  >or=6.5%  Consistent with diabetes  Currently, no consensus exists for use of hemoglobin A1c  for diagnosis of diabetes for children.  This Hemoglobin A1c assay has significant interference with fetal   hemoglobin   (HbF). The results are invalid for patients with abnormal amounts of   HbF,   including those with known Hereditary Persistence   of Fetal Hemoglobin. Heterozygous hemoglobin variants (HbAS, HbAC,   HbAD, HbAE, HbA2) do not significantly interfere with this assay;   however, presence of multiple variants in a sample may impact the %   interference.     01/10/2018 7.1 (H) 4.0 - 5.6 % Final     Comment:     According to ADA guidelines, hemoglobin A1c <7.0% represents  optimal control in non-pregnant diabetic patients. Different  metrics may apply to specific patient populations.   Standards of Medical Care in Diabetes-2016.  For the purpose of screening for the presence of diabetes:  <5.7%     Consistent with the absence of diabetes  5.7-6.4%  Consistent with increasing risk for diabetes   (prediabetes)  >or=6.5%  Consistent with diabetes  Currently, no consensus exists for use of hemoglobin A1c  for diagnosis of diabetes for children.  This Hemoglobin A1c assay has significant interference with fetal   hemoglobin   (HbF). The results are invalid for patients with abnormal amounts of   HbF,   including those with known Hereditary Persistence   of Fetal Hemoglobin. Heterozygous hemoglobin variants (HbAS, HbAC,   HbAD,  "HbAE, HbA2) do not significantly interfere with this assay;   however, presence of multiple variants in a sample may impact the %   interference.     01/10/2018 7.1 (H) 4.0 - 5.6 % Final     Comment:     According to ADA guidelines, hemoglobin A1c <7.0% represents  optimal control in non-pregnant diabetic patients. Different  metrics may apply to specific patient populations.   Standards of Medical Care in Diabetes-2016.  For the purpose of screening for the presence of diabetes:  <5.7%     Consistent with the absence of diabetes  5.7-6.4%  Consistent with increasing risk for diabetes   (prediabetes)  >or=6.5%  Consistent with diabetes  Currently, no consensus exists for use of hemoglobin A1c  for diagnosis of diabetes for children.  This Hemoglobin A1c assay has significant interference with fetal   hemoglobin   (HbF). The results are invalid for patients with abnormal amounts of   HbF,   including those with known Hereditary Persistence   of Fetal Hemoglobin. Heterozygous hemoglobin variants (HbAS, HbAC,   HbAD, HbAE, HbA2) do not significantly interfere with this assay;   however, presence of multiple variants in a sample may impact the %   interference.       Vitals:    04/02/18 0741   BP: 124/69   Pulse: 67   Weight: 110.7 kg (244 lb)   Height: 5' 2" (1.575 m)   PainSc: 0-No pain      Past Medical History:   Diagnosis Date    Allergy     Angio-edema     Arthritis     Cataract     Cerebrovascular malformation     Colon polyps     Coronary artery disease     Diabetes mellitus, type 2     Diabetic peripheral neuropathy     GERD (gastroesophageal reflux disease)     Herpes infection     Hyperlipidemia     Hypertension     Hypothyroidism     Kidney stones     Nausea & vomiting 11/23/2015    Obesity, morbid     Ovarian cyst     Pyelonephritis, chronic     Seizure disorder, focal motor     Sleep apnea     Type II or unspecified type diabetes mellitus with neurological manifestations, " uncontrolled(250.62)     Urinary tract infection     Urticaria     Vaginal infection        Past Surgical History:   Procedure Laterality Date    CARPAL TUNNEL RELEASE Right 2014    COLONOSCOPY      COLONOSCOPY N/A 9/13/2017    Procedure: COLONOSCOPY Golytely;  Surgeon: Gisela Wall MD;  Location: [facility];  Service: Endoscopy;  Laterality: N/A;    CYST REMOVAL      skin; multiples    EXTRACORPOREAL SHOCK WAVE LITHOTRIPSY  2002    HYSTERECTOMY  1984    KIDNEY STONE SURGERY      THYROIDECTOMY  1977         TONSILLECTOMY, ADENOIDECTOMY      TRIGGER FINGER RELEASE      TUBAL LIGATION         Family History   Problem Relation Age of Onset    Cancer Father     Arthritis Father     Gout Father     Allergies Son     Allergic rhinitis Son     Skin cancer Mother     Macular degeneration Mother     Dementia Mother     Hypertension Mother     No Known Problems Daughter     Diabetes Daughter     No Known Problems Daughter     Glaucoma Maternal Aunt      Great Maternal Aunt    Leukemia Maternal Uncle     Amblyopia Neg Hx     Cataracts Neg Hx     Retinal detachment Neg Hx     Strabismus Neg Hx     Stroke Neg Hx     Thyroid disease Neg Hx     Kidney disease Neg Hx     Angioedema Neg Hx     Asthma Neg Hx     Atopy Neg Hx     Eczema Neg Hx     Immunodeficiency Neg Hx     Rhinitis Neg Hx     Urticaria Neg Hx        Social History     Social History    Marital status:      Spouse name: N/A    Number of children: N/A    Years of education: N/A     Occupational History    [employer]     Social History Main Topics    Smoking status: Never Smoker    Smokeless tobacco: Never Used    Alcohol use No    Drug use: No    Sexual activity: Yes     Partners: Male     Other Topics Concern    Not on file     Social History Narrative    No narrative on file       Current Outpatient Prescriptions   Medication Sig Dispense Refill    amlodipine (NORVASC) 5 MG  tablet TAKE 1 TABLET EVERY DAY 90 tablet 3    aspirin (ECOTRIN) 81 MG EC tablet Take 81 mg by mouth once daily.      azelastine (ASTELIN) 137 mcg (0.1 %) nasal spray 2 sprays (274 mcg total) by Nasal route 2 (two) times daily. 90 mL 4    biotin 10,000 mcg Cap Take by mouth.      blood sugar diagnostic (TRUE METRIX GLUCOSE TEST STRIP) Strp TEST TWO TIMES DAILY 200 strip 6    blood-glucose meter Misc [employer] True Metrix Air meter 1 each 0    calcium carbonate-vitamin D3 600 mg(1,500mg) -100 unit Cap Take 1 tablet by mouth once daily.      chlorthalidone (HYGROTEN) 25 MG Tab Take 1 tablet (25 mg total) by mouth once daily. 30 tablet 1    econazole nitrate 1 % cream       fluticasone (FLONASE) 50 mcg/actuation nasal spray 2 sprays (100 mcg total) by Each Nare route once daily. 48 g 4    gabapentin (NEURONTIN) 300 MG capsule TAKE 1 CAPSULE EVERY EVENING 90 capsule 1    glucagon (human recombinant) inj 1mg/mL kit Inject 1 mL (1 mg total) into the muscle as needed. 1 kit 6    insulin detemir (LEVEMIR FLEXTOUCH) 100 unit/mL (3 mL) SubQ InPn pen Inject 14 Units into the skin every evening. 1 Box 6    ketotifen (ZADITOR) 0.025 % (0.035 %) ophthalmic solution Place 1-2 drops into both eyes once daily.      L.acid-B.bifidum-B.animal-FOS (PROBIOTIC COMPLEX) 25 billion cell -100 mg Cap Take 1 capsule by mouth once daily.      lancets (TRUEPLUS LANCETS) 28 gauge [facility] Inject 1 lancet into the skin 2 (two) times daily before meals. 200 each 6    levothyroxine (SYNTHROID) 75 MCG tablet TAKE 1 TABLET ONE TIME DAILY 90 tablet 1    liraglutide 0.6 mg/0.1 mL, 18 mg/3 mL, subq PNIJ (VICTOZA 3-TAWANA) 0.6 mg/0.1 mL (18 mg/3 mL) PnIj INJECT 1.8 MG INTO THE SKIN ONCE DAILY. 27 mL 11    loratadine (CLARITIN) 10 mg tablet Take 10 mg by mouth once daily.      lovastatin (MEVACOR) 40 MG tablet Take 1 tablet (40 mg total) by mouth nightly. 90 tablet 1    magnesium oxide-Mg AA chelate (MAGNESIUM, AMINO ACID CHELATE,) 133 mg Tab  "Take by mouth as directed.       metFORMIN (GLUCOPHAGE) 1000 MG tablet Take 1 tablet (1,000 mg total) by mouth 2 (two) times daily with meals. 180 tablet 3    omeprazole (PRILOSEC) 20 MG capsule Take 1 capsule (20 mg total) by mouth daily as needed. 90 capsule 1    pen needle, diabetic (BD ULTRA-FINE EDUARDO PEN NEEDLES) 32 gauge x 5/32" Ndle USE WITH VICTOZA AND LEVEMIR (2 INJECTIONS EVERY DAY) 100 each 3    PREMARIN vaginal cream       repaglinide (PRANDIN) 1 MG tablet Take 1 tablet (1 mg total) by mouth 3 (three) times daily before meals. 270 tablet 3    silver sulfADIAZINE 1% (SILVADENE) 1 % cream Apply topically 2 (two) times daily. 25 g 1    valACYclovir (VALTREX) 500 MG tablet Take 1 tablet (500 mg total) by mouth once daily. 90 tablet 1    valsartan (DIOVAN) 160 MG tablet TAKE 1 TABLET TWICE DAILY 180 tablet 1    diclofenac sodium (VOLTAREN) 1 % Gel Apply 2 g topically 4 (four) times daily. 3 Tube 3     Current Facility-Administered Medications   Medication Dose Route Frequency Provider Last Rate Last Dose    Allergy Mix   Subcutaneous 1 time in Clinic/HOD Ailin Mills MD           Allergies   Allergen Reactions    Sulfa (Sulfonamide Antibiotics) Nausea Only    Ciprofloxacin     Januvia [Sitagliptin]      abd pain    Lisinopril     Lotensin [Benazepril]     Nexium [Esomeprazole Magnesium] Other (See Comments)     Gas         Review of Systems   Constitution: Negative for chills, fever, weakness and malaise/fatigue.   Cardiovascular: Negative for chest pain, claudication and leg swelling.   Respiratory: Negative for cough and shortness of breath.    Skin: Positive for nail changes. Negative for color change, dry skin and itching.   Musculoskeletal: Positive for back pain. Negative for joint pain, muscle cramps and muscle weakness.   Gastrointestinal: Negative for nausea and vomiting.   Neurological: Positive for numbness and paresthesias.   Psychiatric/Behavioral: Negative for altered " mental status.           Objective:      Physical Exam   Constitutional: She is oriented to person, place, and time. She appears well-nourished. No distress.   Cardiovascular: Intact distal pulses.    Pulses:       Dorsalis pedis pulses are 2+ on the right side, and 2+ on the left side.        Posterior tibial pulses are 2+ on the right side, and 2+ on the left side.   CFT< 3 secs all toes bilateral foot, skin temp warm bilateral foot, no digital hair growth bilateral foot, no lower extremity edema bilateral.       Musculoskeletal:        Right foot: There is no deformity.        Left foot: There is no deformity.   Mild pain plantar 2 MTP, - Lauchman test bilateral foot. Gastrocnemius equinus bilateral. Rectus foot type with adductovarus rotation of fifth toe bilateral. No pain with ROM or MMT bilateral foot.   Feet:   Right Foot:   Protective Sensation: 10 sites tested. 10 sites sensed.   Skin Integrity: Negative for ulcer, blister, skin breakdown, erythema, warmth, callus or dry skin.   Left Foot:   Protective Sensation: 10 sites tested. 10 sites sensed.   Skin Integrity: Negative for ulcer, blister, skin breakdown, erythema, warmth, callus or dry skin.   Neurological: She is alert and oriented to person, place, and time. She has normal strength. No sensory deficit.   Vibratory sensation intact bilateral foot.       Skin: Skin is warm, dry and intact. Capillary refill takes less than 2 seconds. No ecchymosis and no rash noted. She is not diaphoretic. No cyanosis or erythema. No pallor. Nails show no clubbing.   Bilateral hallux nail bed with healthy appearing skin and no recurrence of nail.    Remaining nails 2-5 b/l normotrophic and trimmed.     No open lesions or macerations bilateral lower extremity.                     Assessment:       Encounter Diagnoses   Name Primary?    Controlled type 2 diabetes mellitus without complication, with long-term current use of insulin Yes    Closed wound          Plan:        Vivienne was seen today for follow-up.    Diagnoses and all orders for this visit:    Controlled type 2 diabetes mellitus without complication, with long-term current use of insulin    Closed wound      I counseled the patient on her conditions, their implications and medical management.    Shoe inspection. Diabetic Foot Education. Patient reminded of the importance of good nutrition and blood sugar control to help prevent podiatric complications of diabetes. Patient instructed on proper foot hygeine. We discussed wearing proper shoe gear, daily foot inspections, never walking without protective shoe gear, never putting sharp instruments to feet, routine podiatric nail visits every 2-3 months. Discussed shoes with adequate toe box space to prevent excessive pressure and rubbing along the toenails.     Reviewed her chart noting low serum albumin. Recommend Glucerna shake daily to increase her value and help with her overall health. Also recommend drinking plenty of water during the day.    RTC 1 year or prn.

## 2018-04-25 ENCOUNTER — OFFICE VISIT (OUTPATIENT)
Dept: ENDOCRINOLOGY | Facility: CLINIC | Age: 68
End: 2018-04-25
Payer: MEDICARE

## 2018-04-25 VITALS
DIASTOLIC BLOOD PRESSURE: 64 MMHG | WEIGHT: 243.88 LBS | SYSTOLIC BLOOD PRESSURE: 118 MMHG | BODY MASS INDEX: 43.21 KG/M2 | HEIGHT: 63 IN | HEART RATE: 74 BPM

## 2018-04-25 DIAGNOSIS — E11.69 DYSLIPIDEMIA ASSOCIATED WITH TYPE 2 DIABETES MELLITUS: ICD-10-CM

## 2018-04-25 DIAGNOSIS — E11.42 DIABETIC POLYNEUROPATHY ASSOCIATED WITH TYPE 2 DIABETES MELLITUS: Primary | ICD-10-CM

## 2018-04-25 DIAGNOSIS — E66.01 MORBID OBESITY WITH BMI OF 40.0-44.9, ADULT: ICD-10-CM

## 2018-04-25 DIAGNOSIS — E78.5 DYSLIPIDEMIA ASSOCIATED WITH TYPE 2 DIABETES MELLITUS: ICD-10-CM

## 2018-04-25 DIAGNOSIS — N18.30 TYPE 2 DIABETES MELLITUS WITH STAGE 3 CHRONIC KIDNEY DISEASE, WITH LONG-TERM CURRENT USE OF INSULIN: ICD-10-CM

## 2018-04-25 DIAGNOSIS — E11.59 HYPERTENSION ASSOCIATED WITH DIABETES: ICD-10-CM

## 2018-04-25 DIAGNOSIS — E89.0 POST-SURGICAL HYPOTHYROIDISM: ICD-10-CM

## 2018-04-25 DIAGNOSIS — Z79.4 TYPE 2 DIABETES MELLITUS WITH STAGE 3 CHRONIC KIDNEY DISEASE, WITH LONG-TERM CURRENT USE OF INSULIN: ICD-10-CM

## 2018-04-25 DIAGNOSIS — G47.33 OSA ON CPAP: ICD-10-CM

## 2018-04-25 DIAGNOSIS — E55.9 VITAMIN D DEFICIENCY: ICD-10-CM

## 2018-04-25 DIAGNOSIS — E11.22 TYPE 2 DIABETES MELLITUS WITH STAGE 3 CHRONIC KIDNEY DISEASE, WITH LONG-TERM CURRENT USE OF INSULIN: ICD-10-CM

## 2018-04-25 DIAGNOSIS — M85.80 OSTEOPENIA, UNSPECIFIED LOCATION: ICD-10-CM

## 2018-04-25 DIAGNOSIS — R79.89 LOW VITAMIN D LEVEL: ICD-10-CM

## 2018-04-25 DIAGNOSIS — I15.2 HYPERTENSION ASSOCIATED WITH DIABETES: ICD-10-CM

## 2018-04-25 PROCEDURE — 99214 OFFICE O/P EST MOD 30 MIN: CPT | Mod: S$GLB,,, | Performed by: NURSE PRACTITIONER

## 2018-04-25 PROCEDURE — 99499 UNLISTED E&M SERVICE: CPT | Mod: S$GLB,,, | Performed by: NURSE PRACTITIONER

## 2018-04-25 PROCEDURE — 3078F DIAST BP <80 MM HG: CPT | Mod: CPTII,S$GLB,, | Performed by: NURSE PRACTITIONER

## 2018-04-25 PROCEDURE — 3074F SYST BP LT 130 MM HG: CPT | Mod: CPTII,S$GLB,, | Performed by: NURSE PRACTITIONER

## 2018-04-25 PROCEDURE — 3045F PR MOST RECENT HEMOGLOBIN A1C LEVEL 7.0-9.0%: CPT | Mod: CPTII,S$GLB,, | Performed by: NURSE PRACTITIONER

## 2018-04-25 PROCEDURE — 99999 PR PBB SHADOW E&M-EST. PATIENT-LVL III: CPT | Mod: PBBFAC,,, | Performed by: NURSE PRACTITIONER

## 2018-04-25 RX ORDER — REPAGLINIDE 2 MG/1
2 TABLET ORAL
Qty: 270 TABLET | Refills: 3 | Status: SHIPPED | OUTPATIENT
Start: 2018-04-25 | End: 2019-02-08

## 2018-04-25 NOTE — PATIENT INSTRUCTIONS
Levemir 16 units   repaglinide 2 mg three times daily (take 2 pills until you get your new prescription)

## 2018-04-25 NOTE — ASSESSMENT & PLAN NOTE
- Clinically and biochemically euthyroid  - Continue lt4 75 mcg daily   - Goal is a normal TSH  - Check TFTs and adjust dosage accordingly   - Avoid exogenous hyperthyroidism as this can accelerate bone loss and increase risk of CV complications.  - Advised to take LT4 on an empty stomach with water and to wait 30-45 minutes before eating or taking other medications   - Reviewed that symptoms of hypothyroidism may not correlate with tsh, and a normal TSH is the goal of therapy.....  symptoms are not a justification for over treatment   - repeat US at the last visit

## 2018-04-26 ENCOUNTER — PATIENT OUTREACH (OUTPATIENT)
Dept: OTHER | Facility: OTHER | Age: 68
End: 2018-04-26

## 2018-04-26 ENCOUNTER — OFFICE VISIT (OUTPATIENT)
Dept: ORTHOPEDICS | Facility: CLINIC | Age: 68
End: 2018-04-26
Payer: MEDICARE

## 2018-04-26 VITALS — HEIGHT: 63 IN | WEIGHT: 243 LBS | BODY MASS INDEX: 43.05 KG/M2

## 2018-04-26 DIAGNOSIS — M17.11 PRIMARY OSTEOARTHRITIS OF RIGHT KNEE: Primary | ICD-10-CM

## 2018-04-26 PROCEDURE — 99204 OFFICE O/P NEW MOD 45 MIN: CPT | Mod: S$GLB,,, | Performed by: ORTHOPAEDIC SURGERY

## 2018-04-26 PROCEDURE — 99999 PR PBB SHADOW E&M-EST. PATIENT-LVL II: CPT | Mod: PBBFAC,,, | Performed by: ORTHOPAEDIC SURGERY
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
## 2018-04-26 NOTE — PROGRESS NOTES
Subjective:      Patient ID: Vivienne Jose is a 67 y.o. female.    Chief Complaint: Pain of the Right Knee    HPI     They have experienced problems with their right knee over the past 3 years. The patient denies relevant history of injury/aggravation. Pain is located medially Associated symptoms include swelling and giving way. They have been treated with multiple injections of cortisone, over the counter analgesics, cane/walker, bracing and physitherapy.   Symptoms have recently worsened. Ambulation reportedly has been impaired. Self care ADLs are painful.  Although injections helped in the past, the patient reports that her injection earlier this month was minimally beneficial    Review of Systems   Constitution: Negative for fever and weight loss.   HENT: Negative for congestion.    Eyes: Negative for visual disturbance.   Cardiovascular: Negative for chest pain.   Respiratory: Negative for shortness of breath.    Hematologic/Lymphatic: Negative for bleeding problem. Does not bruise/bleed easily.   Skin: Negative for poor wound healing.   Musculoskeletal: Positive for joint pain.   Gastrointestinal: Negative for abdominal pain.   Genitourinary: Negative for dysuria.   Neurological: Negative for seizures.   Psychiatric/Behavioral: Negative for altered mental status.   Allergic/Immunologic: Negative for persistent infections.         Objective:            Ortho/SPM Exam    Right Knee    There were no vitals filed for this visit.    The patient is not in acute distress.   Body habitus is obese.   The patient walks with a limp.  Resisted SLR negative.   The skin over the knee is intact.  Knee effusion trace  Tendernes is located presentmedial  Range of motion- Flexion 130 deg, Extension 0 deg,   Ligament exam:   MCL 1+ laxity   Lachman intact              Post sag intact    LCL intact  Patellar apprehension negative.  Popliteal cyst negative  Patellar crepitation absent.  Flexion/pinch negative.  Pulses DP present,  PT present.  Motor normal 5/5 strength in all tested muscle groups.   Sensory normal.I reviewed the relevant radiographic images for the patient's condition:      I reviewed the relevant radiographic images for the patient's condition: There is moderate medial compartment narrowing    There were no vitals filed for this visit.    T        Assessment:       No diagnosis found.       Plan:       There are no diagnoses linked to this encounter.    I explained my diagnostic impression and the reasoning behind it in detail, using layman's terms.  Models and/or pictures were used to help in the explanation.    Patient requested Synvisc injections.  This is reasonable.  Process and limitations explained      Weight loss recommended      I explained the potential role of surgery in the treatment of this condition to the patient.  They understand that if nonsurgical measures do not adequately control symptoms, surgery will be considered in the future.      Patient will see my physician assistant for injections when the product is available.  She should follow up with me 1 month after the injections are completed

## 2018-04-30 ENCOUNTER — CLINICAL SUPPORT (OUTPATIENT)
Dept: INTERNAL MEDICINE | Facility: CLINIC | Age: 68
End: 2018-04-30
Payer: MEDICARE

## 2018-04-30 DIAGNOSIS — J30.2 SEASONAL ALLERGIC RHINITIS, UNSPECIFIED TRIGGER: ICD-10-CM

## 2018-04-30 PROCEDURE — 95115 IMMUNOTHERAPY ONE INJECTION: CPT | Mod: S$GLB,,, | Performed by: FAMILY MEDICINE

## 2018-04-30 PROCEDURE — 99499 UNLISTED E&M SERVICE: CPT | Mod: S$GLB,,, | Performed by: ALLERGY & IMMUNOLOGY

## 2018-04-30 NOTE — PROGRESS NOTES
Patient here for allergy injection.  No new meds, no problems with last injection.    0.5 mL of Red 1:1 given SQ in upper Left arm.  Tolerated well.    Site checked after 30 minutes,1+ reaction of erythema and wheal noted.  No complaints voiced.

## 2018-05-06 ENCOUNTER — PATIENT MESSAGE (OUTPATIENT)
Dept: INTERNAL MEDICINE | Facility: CLINIC | Age: 68
End: 2018-05-06

## 2018-05-07 ENCOUNTER — TELEPHONE (OUTPATIENT)
Dept: INTERNAL MEDICINE | Facility: CLINIC | Age: 68
End: 2018-05-07

## 2018-05-07 ENCOUNTER — OFFICE VISIT (OUTPATIENT)
Dept: OPTOMETRY | Facility: CLINIC | Age: 68
End: 2018-05-07
Payer: MEDICARE

## 2018-05-07 DIAGNOSIS — I15.2 HYPERTENSION ASSOCIATED WITH DIABETES: ICD-10-CM

## 2018-05-07 DIAGNOSIS — H25.13 NUCLEAR SCLEROSIS, BILATERAL: ICD-10-CM

## 2018-05-07 DIAGNOSIS — H52.13 MYOPIA WITH ASTIGMATISM AND PRESBYOPIA, BILATERAL: ICD-10-CM

## 2018-05-07 DIAGNOSIS — E11.9 TYPE 2 DIABETES MELLITUS WITHOUT RETINOPATHY: Primary | ICD-10-CM

## 2018-05-07 DIAGNOSIS — H52.4 MYOPIA WITH ASTIGMATISM AND PRESBYOPIA, BILATERAL: ICD-10-CM

## 2018-05-07 DIAGNOSIS — H52.203 MYOPIA WITH ASTIGMATISM AND PRESBYOPIA, BILATERAL: ICD-10-CM

## 2018-05-07 DIAGNOSIS — E11.59 HYPERTENSION ASSOCIATED WITH DIABETES: ICD-10-CM

## 2018-05-07 PROCEDURE — 99999 PR PBB SHADOW E&M-EST. PATIENT-LVL II: CPT | Mod: PBBFAC,,, | Performed by: OPTOMETRIST

## 2018-05-07 PROCEDURE — 92015 DETERMINE REFRACTIVE STATE: CPT | Mod: S$GLB,,, | Performed by: OPTOMETRIST

## 2018-05-07 PROCEDURE — 92014 COMPRE OPH EXAM EST PT 1/>: CPT | Mod: S$GLB,,, | Performed by: OPTOMETRIST

## 2018-05-07 PROCEDURE — 99499 UNLISTED E&M SERVICE: CPT | Mod: S$GLB,,, | Performed by: OPTOMETRIST

## 2018-05-07 RX ORDER — CHLORTHALIDONE 25 MG/1
25 TABLET ORAL DAILY
Qty: 30 TABLET | Refills: 1 | OUTPATIENT
Start: 2018-05-07 | End: 2019-05-07

## 2018-05-07 RX ORDER — CHLORTHALIDONE 25 MG/1
25 TABLET ORAL DAILY
Qty: 90 TABLET | Refills: 1 | Status: SHIPPED | OUTPATIENT
Start: 2018-05-07 | End: 2018-11-16 | Stop reason: SDUPTHER

## 2018-05-07 NOTE — PROGRESS NOTES
DONAVON GOULD 05/2017  Diabetic BS Small print not as clear.  Glasses 3-4 yrs. Old.    Distance seems fine.  Diabetic eye exam  Hemoglobin A1C       Date                     Value               Ref Range             Status                04/02/2018               7.3 (H)             4.0 - 5.6 %           Final            01/10/2018               7.1 (H)             4.0 - 5.6 %           Final                01/10/2018               7.1 (H)             4.0 - 5.6 %           Final             Last edited by Lyndon Mathews, OD on 5/7/2018 10:12 AM. (History)            Assessment /Plan     For exam results, see Encounter Report.    Type 2 diabetes mellitus without retinopathy    Nuclear sclerosis, bilateral    Myopia with astigmatism and presbyopia, bilateral      1. No diabetic retinopathy, no csme. Return in 1 year for dilated eye exam.  2. Educated pt on presence of cataracts and effects on vision. No surgery at this time. Recheck in one year.  3. Spec Rx given. Different lens options discussed with patient. RTC 1 year full exam.

## 2018-05-13 DIAGNOSIS — I15.2 HYPERTENSION ASSOCIATED WITH DIABETES: ICD-10-CM

## 2018-05-13 DIAGNOSIS — E11.59 HYPERTENSION ASSOCIATED WITH DIABETES: ICD-10-CM

## 2018-05-14 RX ORDER — VALSARTAN 160 MG/1
TABLET ORAL
Qty: 180 TABLET | Refills: 0 | Status: SHIPPED | OUTPATIENT
Start: 2018-05-14 | End: 2018-07-25 | Stop reason: SDUPTHER

## 2018-05-17 ENCOUNTER — PATIENT OUTREACH (OUTPATIENT)
Dept: OTHER | Facility: OTHER | Age: 68
End: 2018-05-17

## 2018-05-17 NOTE — PROGRESS NOTES
Last 5 Patient Entered Readings                                      Current 30 Day Average: 129/63     Recent Readings 5/16/2018 5/15/2018 5/14/2018 5/13/2018 5/13/2018    SBP (mmHg) 121 127 131 132 142    DBP (mmHg) 73 65 65 66 69    Pulse 65 66 69 72 75        Patient's BP average is controlled based on 2017 ACC/AHA HTN guidelines of goal BP <130/80.      Ms. Jose is doing well. She is on vacation, but is checking her BP and her readings are at goal. She has no questions or concerns.     Patient's health , Maria Luisa Dior, will be following up every 3-4 weeks. I will continue to monitor regularly and will follow up in 3-4 months, sooner if BP begins to trend upward or downward.    Patient has my contact information and knows to call with any concerns or clinical changes.     Current HTN regimen:  Hypertension Medications             amlodipine (NORVASC) 5 MG tablet TAKE 1 TABLET EVERY DAY    chlorthalidone (HYGROTEN) 25 MG Tab Take 1 tablet (25 mg total) by mouth once daily.    valsartan (DIOVAN) 160 MG tablet TAKE 1 TABLET TWICE DAILY

## 2018-05-24 ENCOUNTER — LAB VISIT (OUTPATIENT)
Dept: LAB | Facility: HOSPITAL | Age: 68
End: 2018-05-24
Attending: UROLOGY
Payer: MEDICARE

## 2018-05-24 ENCOUNTER — PATIENT MESSAGE (OUTPATIENT)
Dept: UROLOGY | Facility: CLINIC | Age: 68
End: 2018-05-24

## 2018-05-24 DIAGNOSIS — N39.0 URINARY TRACT INFECTION WITHOUT HEMATURIA, SITE UNSPECIFIED: Primary | ICD-10-CM

## 2018-05-24 DIAGNOSIS — N39.0 URINARY TRACT INFECTION WITHOUT HEMATURIA, SITE UNSPECIFIED: ICD-10-CM

## 2018-05-24 PROCEDURE — 87077 CULTURE AEROBIC IDENTIFY: CPT

## 2018-05-24 PROCEDURE — 87186 SC STD MICRODIL/AGAR DIL: CPT

## 2018-05-24 PROCEDURE — 87086 URINE CULTURE/COLONY COUNT: CPT

## 2018-05-24 PROCEDURE — 87088 URINE BACTERIA CULTURE: CPT

## 2018-05-24 RX ORDER — NITROFURANTOIN 25; 75 MG/1; MG/1
100 CAPSULE ORAL 2 TIMES DAILY
Qty: 20 CAPSULE | Refills: 0 | Status: SHIPPED | OUTPATIENT
Start: 2018-05-24 | End: 2018-06-03

## 2018-05-24 NOTE — TELEPHONE ENCOUNTER
Patient's creatinine was 1.0, 2/1/2018, not allergic to Macrobid.   10 day course was sent as requested

## 2018-05-25 ENCOUNTER — CLINICAL SUPPORT (OUTPATIENT)
Dept: INTERNAL MEDICINE | Facility: CLINIC | Age: 68
End: 2018-05-25
Payer: MEDICARE

## 2018-05-25 DIAGNOSIS — J30.9 CHRONIC ALLERGIC RHINITIS: ICD-10-CM

## 2018-05-25 PROCEDURE — 95115 IMMUNOTHERAPY ONE INJECTION: CPT | Mod: S$GLB,,, | Performed by: FAMILY MEDICINE

## 2018-05-25 PROCEDURE — 99499 UNLISTED E&M SERVICE: CPT | Mod: S$GLB,,, | Performed by: ALLERGY & IMMUNOLOGY

## 2018-05-28 ENCOUNTER — TELEPHONE (OUTPATIENT)
Dept: UROLOGY | Facility: CLINIC | Age: 68
End: 2018-05-28

## 2018-05-28 DIAGNOSIS — N30.90 BLADDER INFECTION: Primary | ICD-10-CM

## 2018-05-28 LAB — BACTERIA UR CULT: NORMAL

## 2018-05-28 RX ORDER — AMOXICILLIN AND CLAVULANATE POTASSIUM 500; 125 MG/1; MG/1
1 TABLET, FILM COATED ORAL 3 TIMES DAILY
Qty: 21 TABLET | Refills: 0 | Status: SHIPPED | OUTPATIENT
Start: 2018-05-28 | End: 2018-06-04

## 2018-05-28 NOTE — TELEPHONE ENCOUNTER
Called the patient and she states that the symptoms haven't fully gone away, still has a bit of a fever.      Discussed the mechanism of action of the medicines and will change to augmentins (she will discontinue the macrobid)  Sent to [facility] which is her preferred local pharmacy

## 2018-06-18 NOTE — PROGRESS NOTES
"CHIEF COMPLAINT:    Mrs. Jose is a 67 y.o. female presenting for a follow up on IC and recurrent UTI.    PRESENTING ILLNESS:    Vivienne Jose is a 67 y.o. female who returns stating that she is doing "OK."  She has a long history of recurrent UTI.  In review with the patient, she uses her Premarin cream three times a week, she has a bm daily, for the most part.  She is taking a probiotic.  Her last cystoscopy was done in 2016 with Dr. Lyons when he treated her for a stone.  Her last upper tract study was 2/23/2017.      Her other medical issue is right sided knee pain, but her orthopod recommended that she lose 25 lbs.  She can do an exercise at the gym in which she sits.  (she cannot do pool exercise because she has severe motion sickness)    REVIEW OF SYSTEMS:    Review of Systems   Constitutional: Positive for malaise/fatigue.   HENT: Negative.    Eyes: Negative.    Respiratory: Negative.    Cardiovascular: Negative.    Gastrointestinal: Positive for nausea.   Genitourinary: Positive for dysuria.   Musculoskeletal: Positive for back pain and joint pain.   Skin: Negative.    Neurological: Negative.    Endo/Heme/Allergies:        Diabetic  Morbidly obese   Psychiatric/Behavioral: Negative.      PATIENT HISTORY:    Past Medical History:   Diagnosis Date    Allergy     Angio-edema     Arthritis     Cataract     Cerebrovascular malformation     Colon polyps     Coronary artery disease     Diabetes mellitus, type 2     Diabetic peripheral neuropathy     GERD (gastroesophageal reflux disease)     Herpes infection     Hyperlipidemia     Hypertension     Hypothyroidism     Kidney stones     Nausea & vomiting 11/23/2015    Obesity, morbid     Ovarian cyst     Pyelonephritis, chronic     Seizure disorder, focal motor     Sleep apnea     Type II or unspecified type diabetes mellitus with neurological manifestations, uncontrolled(250.62)     Urinary tract infection     Urticaria     Vaginal " infection        Past Surgical History:   Procedure Laterality Date    CARPAL TUNNEL RELEASE Right 2014    COLONOSCOPY      COLONOSCOPY N/A 9/13/2017    Procedure: COLONOSCOPY Golytely;  Surgeon: Gisela Wall MD;  [facility];  Service: Endoscopy;  Laterality: N/A;    CYST REMOVAL      skin; multiples    EXTRACORPOREAL SHOCK WAVE LITHOTRIPSY  2002    HYSTERECTOMY  1984    KIDNEY STONE SURGERY      THYROIDECTOMY  1977         TONSILLECTOMY, ADENOIDECTOMY      TRIGGER FINGER RELEASE      TUBAL LIGATION         Family History   Problem Relation Age of Onset    Cancer Father     Arthritis Father     Gout Father     Allergies Son     Allergic rhinitis Son     Skin cancer Mother     Macular degeneration Mother     Dementia Mother     Hypertension Mother     No Known Problems Daughter     Diabetes Daughter     No Known Problems Daughter     Glaucoma Maternal Aunt         Great Maternal Aunt    Leukemia Maternal Uncle      Social History    Marital status:      Occupational History    retired [employer]     Social History Main Topics    Smoking status: Never Smoker    Smokeless tobacco: Never Used    Alcohol use No    Drug use: No    Sexual activity: Yes     Partners: Male       Allergies:  Sulfa (sulfonamide antibiotics); Ciprofloxacin; Januvia [sitagliptin]; Lisinopril; Lotensin [benazepril]; and Nexium [esomeprazole magnesium]    Medications:  Outpatient Encounter Prescriptions as of 6/20/2018   Medication Sig Dispense Refill    amlodipine (NORVASC) 5 MG tablet TAKE 1 TABLET EVERY DAY 90 tablet 3    aspirin (ECOTRIN) 81 MG EC tablet Take 81 mg by mouth once daily.      azelastine (ASTELIN) 137 mcg (0.1 %) nasal spray 2 sprays (274 mcg total) by Nasal route 2 (two) times daily. 90 mL 4    biotin 10,000 mcg Cap Take by mouth.      blood sugar diagnostic (TRUE METRIX GLUCOSE TEST STRIP) Strp TEST TWO TIMES DAILY 200 strip 6    blood-glucose meter Misc  "[employer] True Metrix Air meter 1 each 0    calcium carbonate-vitamin D3 600 mg(1,500mg) -100 unit Cap Take 1 tablet by mouth once daily.      chlorthalidone (HYGROTEN) 25 MG Tab Take 1 tablet (25 mg total) by mouth once daily. 90 tablet 1    econazole nitrate 1 % cream       fluticasone (FLONASE) 50 mcg/actuation nasal spray 2 sprays (100 mcg total) by Each Nare route once daily. 48 g 4    gabapentin (NEURONTIN) 300 MG capsule TAKE 1 CAPSULE EVERY EVENING 90 capsule 1    glucagon (human recombinant) inj 1mg/mL kit Inject 1 mL (1 mg total) into the muscle as needed. 1 kit 6    insulin detemir U-100 (LEVEMIR FLEXTOUCH U-100 INSULN) 100 unit/mL (3 mL) SubQ InPn pen Inject 16 Units into the skin every evening. 1 Box 6    ketotifen (ZADITOR) 0.025 % (0.035 %) ophthalmic solution Place 1-2 drops into both eyes once daily.      L.acid-B.bifidum-B.animal-FOS (PROBIOTIC COMPLEX) 25 billion cell -100 mg Cap Take 1 capsule by mouth once daily.      lancets (TRUEPLUS LANCETS) 28 gauge Misc Inject 1 lancet into the skin 2 (two) times daily before meals. 200 each 6    levothyroxine (SYNTHROID) 75 MCG tablet TAKE 1 TABLET ONE TIME DAILY 90 tablet 1    liraglutide 0.6 mg/0.1 mL, 18 mg/3 mL, subq PNIJ (VICTOZA 3-TAWANA) 0.6 mg/0.1 mL (18 mg/3 mL) PnIj INJECT 1.8 MG INTO THE SKIN ONCE DAILY. 27 mL 11    loratadine (CLARITIN) 10 mg tablet Take 10 mg by mouth once daily.      lovastatin (MEVACOR) 40 MG tablet Take 1 tablet (40 mg total) by mouth nightly. 90 tablet 1    magnesium oxide-Mg AA chelate (MAGNESIUM, AMINO ACID CHELATE,) 133 mg Tab Take by mouth as directed.       metFORMIN (GLUCOPHAGE) 1000 MG tablet Take 1 tablet (1,000 mg total) by mouth 2 (two) times daily with meals. 180 tablet 3    omeprazole (PRILOSEC) 20 MG capsule Take 1 capsule (20 mg total) by mouth daily as needed. 90 capsule 1    pen needle, diabetic (BD ULTRA-FINE EDUARDO PEN NEEDLES) 32 gauge x 5/32" Ndle USE WITH VICTOZA AND LEVEMIR (2 INJECTIONS " EVERY DAY) 100 each 3    PREMARIN vaginal cream       repaglinide (PRANDIN) 2 MG tablet Take 1 tablet (2 mg total) by mouth 3 (three) times daily before meals. 270 tablet 3    silver sulfADIAZINE 1% (SILVADENE) 1 % cream Apply topically 2 (two) times daily. 25 g 1    valACYclovir (VALTREX) 500 MG tablet Take 1 tablet (500 mg total) by mouth once daily. 90 tablet 1    valsartan (DIOVAN) 160 MG tablet TAKE 1 TABLET TWICE DAILY 180 tablet 0    diclofenac sodium (VOLTAREN) 1 % Gel Apply 2 g topically 4 (four) times daily. 3 Tube 3    methenamine (HIPREX) 1 gram Tab Take 1 tablet (1 g total) by mouth 2 (two) times daily. 60 tablet 6     Facility-Administered Encounter Medications as of 6/20/2018   Medication Dose Route Frequency Provider Last Rate Last Dose    Allergy Mix   Subcutaneous 1 time in Clinic/HOD Ailin Mills MD        hyaluronate 16 mg/2 mL injection 16 mg  16 mg Intra-articular 1 time in Clinic/HOD Pj Gamboa MD             PHYSICAL EXAMINATION:    The patient generally appears in good health, is appropriately interactive, and is in no apparent distress.    Skin: No lesions.    Mental: Cooperative with normal affect.    Neuro: Grossly intact.    HEENT: Normal. No evidence of lymphadenopathy.    Chest:  normal inspiratory effort.    Extremities: No clubbing, cyanosis, or edema      LABS:    Lab Results   Component Value Date    BUN 17 02/01/2018    CREATININE 1.0 02/01/2018     UA 1.005, pH 7, otherwise, negative    IMPRESSION:    Encounter Diagnoses   Name Primary?    Recurrent UTI Yes       PLAN:    1. Renal ultrasound  2.  Methenamine bid  3.  Take vitamin C with the methenamine.  She takes a multivit, ok to take that in the am, then take an extra 500 mg with the evening dose of the methenamine.    4.  Follow up in 3 months.  5.  We discussed weight loss strategies

## 2018-06-20 ENCOUNTER — OFFICE VISIT (OUTPATIENT)
Dept: UROLOGY | Facility: CLINIC | Age: 68
End: 2018-06-20
Payer: MEDICARE

## 2018-06-20 VITALS
BODY MASS INDEX: 41.97 KG/M2 | HEIGHT: 64 IN | HEART RATE: 74 BPM | WEIGHT: 245.81 LBS | DIASTOLIC BLOOD PRESSURE: 57 MMHG | SYSTOLIC BLOOD PRESSURE: 134 MMHG

## 2018-06-20 DIAGNOSIS — N39.0 RECURRENT UTI: Primary | ICD-10-CM

## 2018-06-20 PROCEDURE — 3078F DIAST BP <80 MM HG: CPT | Mod: CPTII,S$GLB,, | Performed by: UROLOGY

## 2018-06-20 PROCEDURE — 3075F SYST BP GE 130 - 139MM HG: CPT | Mod: CPTII,S$GLB,, | Performed by: UROLOGY

## 2018-06-20 PROCEDURE — 99999 PR PBB SHADOW E&M-EST. PATIENT-LVL V: CPT | Mod: PBBFAC,,, | Performed by: UROLOGY

## 2018-06-20 PROCEDURE — 99213 OFFICE O/P EST LOW 20 MIN: CPT | Mod: S$GLB,,, | Performed by: UROLOGY

## 2018-06-20 PROCEDURE — 99499 UNLISTED E&M SERVICE: CPT | Mod: S$GLB,,, | Performed by: UROLOGY

## 2018-06-20 RX ORDER — METHENAMINE HIPPURATE 1000 MG/1
1 TABLET ORAL 2 TIMES DAILY
Qty: 60 TABLET | Refills: 6 | Status: SHIPPED | OUTPATIENT
Start: 2018-06-20 | End: 2018-07-16 | Stop reason: ALTCHOICE

## 2018-06-21 ENCOUNTER — OFFICE VISIT (OUTPATIENT)
Dept: CARDIOLOGY | Facility: CLINIC | Age: 68
End: 2018-06-21
Payer: MEDICARE

## 2018-06-21 VITALS
HEART RATE: 73 BPM | WEIGHT: 244.5 LBS | SYSTOLIC BLOOD PRESSURE: 120 MMHG | OXYGEN SATURATION: 96 % | DIASTOLIC BLOOD PRESSURE: 72 MMHG | BODY MASS INDEX: 44.99 KG/M2 | HEIGHT: 62 IN

## 2018-06-21 DIAGNOSIS — E66.01 MORBID OBESITY WITH BMI OF 40.0-44.9, ADULT: ICD-10-CM

## 2018-06-21 DIAGNOSIS — I25.10 CORONARY ARTERY DISEASE INVOLVING NATIVE CORONARY ARTERY OF NATIVE HEART WITHOUT ANGINA PECTORIS: ICD-10-CM

## 2018-06-21 DIAGNOSIS — I77.9 BILATERAL CAROTID ARTERY DISEASE: ICD-10-CM

## 2018-06-21 DIAGNOSIS — I10 ESSENTIAL HYPERTENSION: Primary | ICD-10-CM

## 2018-06-21 DIAGNOSIS — R53.83 FATIGUE, UNSPECIFIED TYPE: ICD-10-CM

## 2018-06-21 DIAGNOSIS — G47.33 OSA ON CPAP: ICD-10-CM

## 2018-06-21 DIAGNOSIS — E11.69 DYSLIPIDEMIA ASSOCIATED WITH TYPE 2 DIABETES MELLITUS: ICD-10-CM

## 2018-06-21 DIAGNOSIS — E78.5 DYSLIPIDEMIA ASSOCIATED WITH TYPE 2 DIABETES MELLITUS: ICD-10-CM

## 2018-06-21 PROCEDURE — 3074F SYST BP LT 130 MM HG: CPT | Mod: CPTII,S$GLB,, | Performed by: INTERNAL MEDICINE

## 2018-06-21 PROCEDURE — 3045F PR MOST RECENT HEMOGLOBIN A1C LEVEL 7.0-9.0%: CPT | Mod: CPTII,S$GLB,, | Performed by: INTERNAL MEDICINE

## 2018-06-21 PROCEDURE — 99214 OFFICE O/P EST MOD 30 MIN: CPT | Mod: S$GLB,,, | Performed by: INTERNAL MEDICINE

## 2018-06-21 PROCEDURE — 3078F DIAST BP <80 MM HG: CPT | Mod: CPTII,S$GLB,, | Performed by: INTERNAL MEDICINE

## 2018-06-21 PROCEDURE — 99499 UNLISTED E&M SERVICE: CPT | Mod: S$GLB,,, | Performed by: INTERNAL MEDICINE

## 2018-06-21 PROCEDURE — 99999 PR PBB SHADOW E&M-EST. PATIENT-LVL III: CPT | Mod: PBBFAC,,, | Performed by: INTERNAL MEDICINE

## 2018-06-21 NOTE — PROGRESS NOTES
Subjective:   Patient ID:  Vivienne Jose is a 67 y.o. female who presents for follow-up of Follow-up      Problem List Items Addressed This Visit        Cardiac/Vascular    HTN (hypertension) - Primary    CAD (coronary artery disease)    Dyslipidemia associated with type 2 diabetes mellitus    Bilateral carotid artery disease       Endocrine    Morbid obesity with BMI of 40.0-44.9, adult       Other    BAM on CPAP    Fatigue          HPI: Patient doing well on f/u. She is here for f/u of the above. Her main complaint today is fatigue. She denies chest pain or dyspnea. She has BAM and is compliant with CPAP. She think fatigue started in May after returning from vacation.   PET stress negative for ischemia. She continues to have BRIONES. Weight is stable. She is not trying to lose weight. No orthopnea or PND. BP is controlled. She is compliant with medications.     Review of Systems   Constitution: Negative.   HENT: Negative.    Eyes: Negative.    Cardiovascular: Negative.    Respiratory: Negative.    Endocrine: Negative.    Hematologic/Lymphatic: Negative.    Skin: Negative.    Musculoskeletal: Negative.    Gastrointestinal: Negative.    Neurological: Negative.      Patient's Medications   New Prescriptions    No medications on file   Previous Medications    AMLODIPINE (NORVASC) 5 MG TABLET    TAKE 1 TABLET EVERY DAY    ASPIRIN (ECOTRIN) 81 MG EC TABLET    Take 81 mg by mouth once daily.    AZELASTINE (ASTELIN) 137 MCG (0.1 %) NASAL SPRAY    2 sprays (274 mcg total) by Nasal route 2 (two) times daily.    BIOTIN 10,000 MCG CAP    Take by mouth.    BLOOD SUGAR DIAGNOSTIC (TRUE METRIX GLUCOSE TEST STRIP) STRP    TEST TWO TIMES DAILY    BLOOD-GLUCOSE METER MISC    Hum[employer] True Metrix Air meter    CALCIUM CARBONATE-VITAMIN D3 600 MG(1,500MG) -100 UNIT CAP    Take 1 tablet by mouth once daily.    CHLORTHALIDONE (HYGROTEN) 25 MG TAB    Take 1 tablet (25 mg total) by mouth once daily.    DICLOFENAC SODIUM (VOLTAREN) 1 % GEL     "Apply 2 g topically 4 (four) times daily.    ECONAZOLE NITRATE 1 % CREAM        FLUTICASONE (FLONASE) 50 MCG/ACTUATION NASAL SPRAY    2 sprays (100 mcg total) by Each Nare route once daily.    GABAPENTIN (NEURONTIN) 300 MG CAPSULE    TAKE 1 CAPSULE EVERY EVENING    GLUCAGON (HUMAN RECOMBINANT) INJ 1MG/ML KIT    Inject 1 mL (1 mg total) into the muscle as needed.    INSULIN DETEMIR U-100 (LEVEMIR FLEXTOUCH U-100 INSULN) 100 UNIT/ML (3 ML) SUBQ INPN PEN    Inject 16 Units into the skin every evening.    KETOTIFEN (ZADITOR) 0.025 % (0.035 %) OPHTHALMIC SOLUTION    Place 1-2 drops into both eyes once daily.    L.ACID-B.BIFIDUM-B.ANIMAL-FOS (PROBIOTIC COMPLEX) 25 BILLION CELL -100 MG CAP    Take 1 capsule by mouth once daily.    LANCETS (TRUEPLUS LANCETS) 28 GAUGE MISC    Inject 1 lancet into the skin 2 (two) times daily before meals.    LEVOTHYROXINE (SYNTHROID) 75 MCG TABLET    TAKE 1 TABLET ONE TIME DAILY    LIRAGLUTIDE 0.6 MG/0.1 ML, 18 MG/3 ML, SUBQ PNIJ (VICTOZA 3-TAWANA) 0.6 MG/0.1 ML (18 MG/3 ML) PNIJ    INJECT 1.8 MG INTO THE SKIN ONCE DAILY.    LORATADINE (CLARITIN) 10 MG TABLET    Take 10 mg by mouth once daily.    LOVASTATIN (MEVACOR) 40 MG TABLET    Take 1 tablet (40 mg total) by mouth nightly.    MAGNESIUM OXIDE-MG AA CHELATE (MAGNESIUM, AMINO ACID CHELATE,) 133 MG TAB    Take by mouth as directed.     METFORMIN (GLUCOPHAGE) 1000 MG TABLET    Take 1 tablet (1,000 mg total) by mouth 2 (two) times daily with meals.    METHENAMINE (HIPREX) 1 GRAM TAB    Take 1 tablet (1 g total) by mouth 2 (two) times daily.    OMEPRAZOLE (PRILOSEC) 20 MG CAPSULE    Take 1 capsule (20 mg total) by mouth daily as needed.    PEN NEEDLE, DIABETIC (BD ULTRA-FINE EDUARDO PEN NEEDLES) 32 GAUGE X 5/32" NDLE    USE WITH VICTOZA AND LEVEMIR (2 INJECTIONS EVERY DAY)    PREMARIN VAGINAL CREAM        REPAGLINIDE (PRANDIN) 2 MG TABLET    Take 1 tablet (2 mg total) by mouth 3 (three) times daily before meals.    SILVER SULFADIAZINE 1% (SILVADENE) " 1 % CREAM    Apply topically 2 (two) times daily.    VALACYCLOVIR (VALTREX) 500 MG TABLET    Take 1 tablet (500 mg total) by mouth once daily.    VALSARTAN (DIOVAN) 160 MG TABLET    TAKE 1 TABLET TWICE DAILY   Modified Medications    No medications on file   Discontinued Medications    No medications on file       Objective:   Physical Exam   Constitutional: She is oriented to person, place, and time. She appears well-developed and well-nourished. No distress.   Examination of the digits showed no clubbing or cyanosis   HENT:   Head: Normocephalic and atraumatic.   Eyes: Conjunctivae are normal. Pupils are equal, round, and reactive to light. Right eye exhibits no discharge.   Neck: Normal range of motion. Neck supple. No JVD present. No thyromegaly present.   No carotid bruits   Cardiovascular: Normal rate, regular rhythm, S1 normal, S2 normal, normal heart sounds, intact distal pulses and normal pulses.  PMI is not displaced.  Exam reveals no gallop, no friction rub and no opening snap.    No murmur heard.  Pulmonary/Chest: Effort normal and breath sounds normal. No respiratory distress. She has no wheezes. She has no rales. She exhibits no tenderness.   Abdominal: Soft. Bowel sounds are normal. She exhibits no distension and no mass. There is no tenderness. There is no guarding.   No hepatosplenomegaly   Musculoskeletal: Normal range of motion. She exhibits no edema or tenderness.   Trace edema   Lymphadenopathy:     She has no cervical adenopathy.   Neurological: She is alert and oriented to person, place, and time.   Skin: Skin is warm. No rash noted. She is not diaphoretic. No erythema.   Psychiatric: She has a normal mood and affect.   Nursing note and vitals reviewed.      ECGs reviewed-NSR with RBBB  LABS reviewed  Imaging including Echoes reviewed-60% with no DD    Assessment:     1. Essential hypertension    2. Fatigue, unspecified type    3. Coronary artery disease involving native coronary artery of  native heart without angina pectoris    4. Dyslipidemia associated with type 2 diabetes mellitus    5. Bilateral carotid artery disease    6. Morbid obesity with BMI of 40.0-44.9, adult    7. BAM on CPAP        Plan:   Fatigue mostly from debility and obesity. Patient encouraged to lose weight but she has severe right knee arthritis likely needing TKA but unable to do surgery because of weight. She continues to eat out. Discuss weight loss with patient again.   CBC, CMP, vitamin D. TSH to evaluate for fatigue. Labs ordered by PCP and patient said she would do in July.   Continue current medications  Low salt diet  Weight loss. Dietary changes discussed with patient, as explained to patient with normal echo and nuclear stress dyspnea is likely secondary to weight.   Increase activity as tolerated  F/u in 6 months.

## 2018-06-25 ENCOUNTER — PATIENT MESSAGE (OUTPATIENT)
Dept: ADMINISTRATIVE | Facility: OTHER | Age: 68
End: 2018-06-25

## 2018-06-26 ENCOUNTER — PATIENT OUTREACH (OUTPATIENT)
Dept: OTHER | Facility: OTHER | Age: 68
End: 2018-06-26

## 2018-06-26 NOTE — PROGRESS NOTES
Last 5 Patient Entered Readings                                      Current 30 Day Average: 130/63     Recent Readings 6/25/2018 6/24/2018 6/21/2018 6/21/2018 6/19/2018    SBP (mmHg) 129 115 121 141 133    DBP (mmHg) 64 46 66 62 65    Pulse 80 67 65 65 83        Digital Medicine: Health  Follow Up    Lifestyle Modifications:    1.Dietary Modifications (Sodium intake <2,000mg/day, food labels, dining out): Patient reports that she has not changed any of her eating habits. She continues to dine out with her . Mrs. Jose has been trying to convince her  to start eating at home. She plans to prepare a meal tonight. Encouraged patient to continue to make small changes to improve dietary habits.    2.Physical Activity: Mrs. Jose has started going to the gym about three times a week. She is only exercising for about 15 minutes at a time, but plans to slowly increase exercise time. Encouragement provided.     3.Medication Therapy: Patient has been compliant with the medication regimen.    4.Patient has the following medication side effects/concerns: None  (Frequency/Alleviating factors/Precipitating factors, etc.)     Follow up with Mrs. Vivienne Jose completed. Mrs. Jose is doing okay. No further questions or concerns. Will continue follow up to achieve health goals.

## 2018-06-27 ENCOUNTER — CLINICAL SUPPORT (OUTPATIENT)
Dept: INTERNAL MEDICINE | Facility: CLINIC | Age: 68
End: 2018-06-27
Payer: MEDICARE

## 2018-06-27 ENCOUNTER — HOSPITAL ENCOUNTER (OUTPATIENT)
Dept: RADIOLOGY | Facility: HOSPITAL | Age: 68
Discharge: HOME | End: 2018-06-27
Attending: UROLOGY
Payer: MEDICARE

## 2018-06-27 DIAGNOSIS — J30.9 CHRONIC ALLERGIC RHINITIS: ICD-10-CM

## 2018-06-27 DIAGNOSIS — N39.0 RECURRENT UTI: ICD-10-CM

## 2018-06-27 PROCEDURE — 95115 IMMUNOTHERAPY ONE INJECTION: CPT | Mod: S$GLB,,, | Performed by: FAMILY MEDICINE

## 2018-06-27 PROCEDURE — 76770 US EXAM ABDO BACK WALL COMP: CPT | Mod: 26,,, | Performed by: RADIOLOGY

## 2018-06-27 PROCEDURE — 99499 UNLISTED E&M SERVICE: CPT | Mod: S$GLB,,, | Performed by: ALLERGY & IMMUNOLOGY

## 2018-06-27 PROCEDURE — 76770 US EXAM ABDO BACK WALL COMP: CPT | Mod: TC

## 2018-06-27 NOTE — PROGRESS NOTES
Patient here for allergy injection.  No new meds, no problems with last injection.    0.5 mL of Red 1:1 given SQ in upper Left arm.  Tolerated well.    Site checked after 30 minutes,2+ reaction of erythema and wheal noted.  No complaints voiced.

## 2018-07-02 ENCOUNTER — PATIENT MESSAGE (OUTPATIENT)
Dept: UROLOGY | Facility: CLINIC | Age: 68
End: 2018-07-02

## 2018-07-06 ENCOUNTER — LAB VISIT (OUTPATIENT)
Dept: LAB | Facility: HOSPITAL | Age: 68
End: 2018-07-06
Attending: INTERNAL MEDICINE
Payer: MEDICARE

## 2018-07-06 DIAGNOSIS — R79.89 LOW VITAMIN D LEVEL: ICD-10-CM

## 2018-07-06 DIAGNOSIS — I15.2 HYPERTENSION ASSOCIATED WITH DIABETES: ICD-10-CM

## 2018-07-06 DIAGNOSIS — E11.59 HYPERTENSION ASSOCIATED WITH DIABETES: ICD-10-CM

## 2018-07-06 LAB
25(OH)D3+25(OH)D2 SERPL-MCNC: 36 NG/ML
ALBUMIN SERPL BCP-MCNC: 3.3 G/DL
ALP SERPL-CCNC: 73 U/L
ALT SERPL W/O P-5'-P-CCNC: 30 U/L
ANION GAP SERPL CALC-SCNC: 11 MMOL/L
AST SERPL-CCNC: 34 U/L
BASOPHILS # BLD AUTO: 0.04 K/UL
BASOPHILS NFR BLD: 0.4 %
BILIRUB SERPL-MCNC: 0.6 MG/DL
BUN SERPL-MCNC: 16 MG/DL
CALCIUM SERPL-MCNC: 9.2 MG/DL
CHLORIDE SERPL-SCNC: 101 MMOL/L
CO2 SERPL-SCNC: 27 MMOL/L
CREAT SERPL-MCNC: 0.8 MG/DL
DIFFERENTIAL METHOD: ABNORMAL
EOSINOPHIL # BLD AUTO: 0.3 K/UL
EOSINOPHIL NFR BLD: 3.4 %
ERYTHROCYTE [DISTWIDTH] IN BLOOD BY AUTOMATED COUNT: 14.7 %
EST. GFR  (AFRICAN AMERICAN): >60 ML/MIN/1.73 M^2
EST. GFR  (NON AFRICAN AMERICAN): >60 ML/MIN/1.73 M^2
ESTIMATED AVG GLUCOSE: 163 MG/DL
GLUCOSE SERPL-MCNC: 173 MG/DL
HBA1C MFR BLD HPLC: 7.3 %
HCT VFR BLD AUTO: 41.7 %
HGB BLD-MCNC: 13.4 G/DL
IMM GRANULOCYTES # BLD AUTO: 0.05 K/UL
IMM GRANULOCYTES NFR BLD AUTO: 0.5 %
LYMPHOCYTES # BLD AUTO: 2.5 K/UL
LYMPHOCYTES NFR BLD: 25.1 %
MCH RBC QN AUTO: 30.2 PG
MCHC RBC AUTO-ENTMCNC: 32.1 G/DL
MCV RBC AUTO: 94 FL
MONOCYTES # BLD AUTO: 0.9 K/UL
MONOCYTES NFR BLD: 8.6 %
NEUTROPHILS # BLD AUTO: 6.2 K/UL
NEUTROPHILS NFR BLD: 62 %
NRBC BLD-RTO: 0 /100 WBC
PLATELET # BLD AUTO: 283 K/UL
PMV BLD AUTO: 10.1 FL
POTASSIUM SERPL-SCNC: 3.2 MMOL/L
PROT SERPL-MCNC: 7.5 G/DL
RBC # BLD AUTO: 4.43 M/UL
SODIUM SERPL-SCNC: 139 MMOL/L
WBC # BLD AUTO: 10.03 K/UL

## 2018-07-06 PROCEDURE — 80053 COMPREHEN METABOLIC PANEL: CPT

## 2018-07-06 PROCEDURE — 83036 HEMOGLOBIN GLYCOSYLATED A1C: CPT

## 2018-07-06 PROCEDURE — 36415 COLL VENOUS BLD VENIPUNCTURE: CPT | Mod: PO

## 2018-07-06 PROCEDURE — 82306 VITAMIN D 25 HYDROXY: CPT

## 2018-07-06 PROCEDURE — 85025 COMPLETE CBC W/AUTO DIFF WBC: CPT

## 2018-07-12 ENCOUNTER — PATIENT OUTREACH (OUTPATIENT)
Dept: OTHER | Facility: OTHER | Age: 68
End: 2018-07-12

## 2018-07-12 NOTE — PROGRESS NOTES
Last 5 Patient Entered Readings                                      Current 30 Day Average: 129/64     Recent Readings 7/11/2018 7/10/2018 7/9/2018 7/8/2018 7/7/2018    SBP (mmHg) 132 133 134 132 130    DBP (mmHg) 65 65 66 67 66    Pulse 67 66 73 68 68        Called Mrs. Jose to follow up on labs done 7/6/18. Potassium was 3.2. She denies any s/s of hypokalemia. She has KCL 20 meq at home. Instructed her to take this once daily until she sees Dr. Felton on Monday and take further instructions from him. She also eats about 1 banana/day.     Will continue to monitor regularly. Will follow up in 3-4 weeks, sooner if BP begins to trend upward or downward.    Patient has my contact information and knows to call with any concerns or clinical changes.     Current HTN regimen:  Hypertension Medications             amlodipine (NORVASC) 5 MG tablet TAKE 1 TABLET EVERY DAY    chlorthalidone (HYGROTEN) 25 MG Tab Take 1 tablet (25 mg total) by mouth once daily.    valsartan (DIOVAN) 160 MG tablet TAKE 1 TABLET TWICE DAILY

## 2018-07-16 ENCOUNTER — LAB VISIT (OUTPATIENT)
Dept: LAB | Facility: HOSPITAL | Age: 68
End: 2018-07-16
Attending: INTERNAL MEDICINE
Payer: MEDICARE

## 2018-07-16 ENCOUNTER — OFFICE VISIT (OUTPATIENT)
Dept: INTERNAL MEDICINE | Facility: CLINIC | Age: 68
End: 2018-07-16
Payer: MEDICARE

## 2018-07-16 VITALS
WEIGHT: 239.88 LBS | OXYGEN SATURATION: 99 % | BODY MASS INDEX: 44.14 KG/M2 | HEART RATE: 60 BPM | DIASTOLIC BLOOD PRESSURE: 60 MMHG | HEIGHT: 62 IN | SYSTOLIC BLOOD PRESSURE: 117 MMHG

## 2018-07-16 DIAGNOSIS — N39.0 URINARY TRACT INFECTION WITHOUT HEMATURIA, SITE UNSPECIFIED: ICD-10-CM

## 2018-07-16 DIAGNOSIS — Z79.4 TYPE 2 DIABETES MELLITUS WITHOUT COMPLICATION, WITH LONG-TERM CURRENT USE OF INSULIN: Primary | ICD-10-CM

## 2018-07-16 DIAGNOSIS — E11.59 HYPERTENSION ASSOCIATED WITH DIABETES: ICD-10-CM

## 2018-07-16 DIAGNOSIS — R26.81 GAIT INSTABILITY: ICD-10-CM

## 2018-07-16 DIAGNOSIS — E66.01 MORBID OBESITY WITH BMI OF 40.0-44.9, ADULT: ICD-10-CM

## 2018-07-16 DIAGNOSIS — E55.9 VITAMIN D DEFICIENCY: ICD-10-CM

## 2018-07-16 DIAGNOSIS — M25.561 CHRONIC PAIN OF RIGHT KNEE: ICD-10-CM

## 2018-07-16 DIAGNOSIS — E11.9 TYPE 2 DIABETES MELLITUS WITHOUT COMPLICATION, WITH LONG-TERM CURRENT USE OF INSULIN: Primary | ICD-10-CM

## 2018-07-16 DIAGNOSIS — E87.6 HYPOKALEMIA: ICD-10-CM

## 2018-07-16 DIAGNOSIS — G89.29 CHRONIC PAIN OF RIGHT KNEE: ICD-10-CM

## 2018-07-16 DIAGNOSIS — I15.2 HYPERTENSION ASSOCIATED WITH DIABETES: ICD-10-CM

## 2018-07-16 DIAGNOSIS — E11.42 DIABETIC POLYNEUROPATHY ASSOCIATED WITH TYPE 2 DIABETES MELLITUS: ICD-10-CM

## 2018-07-16 DIAGNOSIS — G47.33 OSA ON CPAP: ICD-10-CM

## 2018-07-16 LAB
BACTERIA #/AREA URNS AUTO: ABNORMAL /HPF
BILIRUB UR QL STRIP: NEGATIVE
CLARITY UR REFRACT.AUTO: ABNORMAL
COLOR UR AUTO: ABNORMAL
GLUCOSE UR QL STRIP: NEGATIVE
HGB UR QL STRIP: NEGATIVE
KETONES UR QL STRIP: NEGATIVE
LEUKOCYTE ESTERASE UR QL STRIP: ABNORMAL
MICROSCOPIC COMMENT: ABNORMAL
NITRITE UR QL STRIP: NEGATIVE
PH UR STRIP: 7 [PH] (ref 5–8)
PROT UR QL STRIP: NEGATIVE
RBC #/AREA URNS AUTO: 7 /HPF (ref 0–4)
SP GR UR STRIP: 1.01 (ref 1–1.03)
SQUAMOUS #/AREA URNS AUTO: 1 /HPF
URN SPEC COLLECT METH UR: ABNORMAL
UROBILINOGEN UR STRIP-ACNC: NEGATIVE EU/DL
WBC #/AREA URNS AUTO: 94 /HPF (ref 0–5)
WBC CLUMPS UR QL AUTO: ABNORMAL

## 2018-07-16 PROCEDURE — 3045F PR MOST RECENT HEMOGLOBIN A1C LEVEL 7.0-9.0%: CPT | Mod: CPTII,S$GLB,, | Performed by: INTERNAL MEDICINE

## 2018-07-16 PROCEDURE — 87077 CULTURE AEROBIC IDENTIFY: CPT

## 2018-07-16 PROCEDURE — 81001 URINALYSIS AUTO W/SCOPE: CPT

## 2018-07-16 PROCEDURE — 3074F SYST BP LT 130 MM HG: CPT | Mod: CPTII,S$GLB,, | Performed by: INTERNAL MEDICINE

## 2018-07-16 PROCEDURE — 87186 SC STD MICRODIL/AGAR DIL: CPT

## 2018-07-16 PROCEDURE — 99999 PR PBB SHADOW E&M-EST. PATIENT-LVL V: CPT | Mod: PBBFAC,,, | Performed by: INTERNAL MEDICINE

## 2018-07-16 PROCEDURE — 87086 URINE CULTURE/COLONY COUNT: CPT

## 2018-07-16 PROCEDURE — 3078F DIAST BP <80 MM HG: CPT | Mod: CPTII,S$GLB,, | Performed by: INTERNAL MEDICINE

## 2018-07-16 PROCEDURE — 99214 OFFICE O/P EST MOD 30 MIN: CPT | Mod: S$GLB,,, | Performed by: INTERNAL MEDICINE

## 2018-07-16 PROCEDURE — 87088 URINE BACTERIA CULTURE: CPT

## 2018-07-16 RX ORDER — POTASSIUM CHLORIDE 750 MG/1
10 TABLET, EXTENDED RELEASE ORAL DAILY
Qty: 30 TABLET | Refills: 3 | Status: SHIPPED | OUTPATIENT
Start: 2018-07-16 | End: 2018-08-31

## 2018-07-16 RX ORDER — NITROFURANTOIN 25; 75 MG/1; MG/1
100 CAPSULE ORAL 2 TIMES DAILY
Qty: 14 CAPSULE | Refills: 0 | Status: SHIPPED | OUTPATIENT
Start: 2018-07-16 | End: 2018-07-23

## 2018-07-16 RX ORDER — GABAPENTIN 300 MG/1
600 CAPSULE ORAL NIGHTLY
Qty: 180 CAPSULE | Refills: 1 | Status: SHIPPED | OUTPATIENT
Start: 2018-07-16 | End: 2018-12-13 | Stop reason: SDUPTHER

## 2018-07-16 NOTE — PROGRESS NOTES
Answers for HPI/ROS submitted by the patient on 7/15/2018   activity change: Yes  unexpected weight change: No  rhinorrhea: No  trouble swallowing: No  chest tightness: No  polyuria: No  difficulty urinating: Yes  menstrual problem: No  joint swelling: Yes  arthralgias: Yes  confusion: No  dysphoric mood: No  Pt. ID: Vivienne Jose is a 68 y.o. female      Chief complaint:   Chief Complaint   Patient presents with    Diabetes     follow up       HPI: Pt. Here for dysuria and frequency for past 2 days and  f/u for DM/ HTN; she is compliant with meds; of note, pt. Sees ortho for R knee arthralgia and her R knee is in a brace; she also uses a cane for gait instability; she would like handicap license; pt. Cannot walk 200 feet without stopping; I reviewed labs dated 7/6/18; potassium is low and she took left over potassium; pt. Is on chlorthalidone and I will start potassium; she is scheduled for labs in 2 weeks and will get repeat potassium at that time; HGBA1C was 7.3 and pt. Is on levimeer 16 units QD and victoza; pt. Sees endocrine; BS run 100-180; she states her diet is poor at times and would prefer to modify diet rather than adjust meds; vitamin D is WNL and pt. Is taking vitamin D; she uses CPAP nightly; of note, pt. Has a bactrim allergy and would like something else and states she needs longer course; pt. Sees urology for recurrent UTI; pt. Continues to have neuropathic pain to B/L legs and pt. Is on neurontin 300 mg QPM which does not always help; she would like to go up on dose Takes asa 81 mg QD      Review of Systems   Eyes: Negative for discharge.   Respiratory: Negative for wheezing.    Cardiovascular: Negative for chest pain and palpitations.   Gastrointestinal: Negative for blood in stool, constipation, diarrhea and vomiting.   Genitourinary: Positive for dysuria. Negative for hematuria.   Musculoskeletal: Positive for joint pain. Negative for neck pain.        Chronic R knee pain: pt. Knee is in a  brace and takes voltaren gel prn; she see ortho   Neurological: Positive for weakness. Negative for headaches.        Neuropathy not always controlled with current dose of neurontin   Endo/Heme/Allergies: Negative for polydipsia.         Objective:    Physical Exam   Constitutional: She is oriented to person, place, and time.   Morbid obesity   Eyes: EOM are normal.   Neck: Normal range of motion.   Cardiovascular: Normal rate, regular rhythm and normal heart sounds.    Pulmonary/Chest: Effort normal and breath sounds normal.   Abdominal: Soft. There is no tenderness. There is no rebound and no guarding.   Musculoskeletal:   Gait instability: uses a cane   Neurological: She is alert and oriented to person, place, and time.   Skin: No rash noted.   Vitals reviewed.        Health Maintenance   Topic Date Due    Influenza Vaccine  08/01/2018    Lipid Panel  09/08/2018    Hemoglobin A1c  01/06/2019    Foot Exam  04/02/2019    Eye Exam  05/07/2019    Mammogram  08/04/2019    Pneumococcal (65+) (2 of 2 - PPSV23) 04/28/2020    DEXA SCAN  01/05/2021    TETANUS VACCINE  06/19/2024    Colonoscopy  09/13/2027    Hepatitis C Screening  Completed    Zoster Vaccine  Completed         Assessment:     1. Type 2 diabetes mellitus without complication, with long-term current use of insulin Sub-optimally controlled   2. Hypertension associated with diabetes Well controlled   3. Urinary tract infection without hematuria, site unspecified Active   4. Diabetic polyneuropathy associated with type 2 diabetes mellitus Sub-optimally controlled   5. Hypokalemia Active   6. Chronic pain of right knee Active   7. BAM on CPAP Well controlled   8. Vitamin D deficiency Well controlled   9. Gait instability Active   10. Morbid obesity with BMI of 40.0-44.9, adult Sub-optimally controlled         Plan: Vivienne was seen today for diabetes.    Diagnoses and all orders for this visit:    Type 2 diabetes mellitus without complication, with  long-term current use of insulin  Comments:  continue current regimen and encouraged stricter ADA  which pt. states is an issue; she does not want adjustment in DM med; f/u endocrine who is managing   Orders:  -     CBC auto differential; Future  -     Comprehensive metabolic panel; Future  -     Hemoglobin A1c; Future    Hypertension associated with diabetes  Comments:  continue current regimen and encouraged low Na diet and weight loss  Orders:  -     CBC auto differential; Future  -     Comprehensive metabolic panel; Future    Urinary tract infection without hematuria, site unspecified  Comments:  start macrobid X 7 days and get U/A and Cx  Orders:  -     Urine culture; Future  -     Urinalysis; Future  -     nitrofurantoin, macrocrystal-monohydrate, (MACROBID) 100 MG capsule; Take 1 capsule (100 mg total) by mouth 2 (two) times daily. for 7 days    Diabetic polyneuropathy associated with type 2 diabetes mellitus  Comments:  increase neurontin to 600 mg QHS  Orders:  -     gabapentin (NEURONTIN) 300 MG capsule; Take 2 capsules (600 mg total) by mouth every evening.    Hypokalemia  Comments:  start potassium 10 meq QD and repeat potassium in 2 weeks for surveillence ordered by endocrine; likeley releated to chlorthalidone  Orders:  -     potassium chloride SA (K-DUR,KLOR-CON) 10 MEQ tablet; Take 1 tablet (10 mEq total) by mouth once daily.  -     Comprehensive metabolic panel; Future    Chronic pain of right knee  Comments:  continue current regimen and f/u ortho who is managing     BAM on CPAP  Comments:  continue CPAP     Vitamin D deficiency  Comments:  continue vitamin D     Gait instability  Comments:  continue cane and handicap form completed    Morbid obesity with BMI of 40.0-44.9, adult  Comments:  encouraged diet and explained risks         Problem List Items Addressed This Visit        Neuro    Diabetic polyneuropathy associated with type 2 diabetes mellitus    Relevant Medications    gabapentin  (NEURONTIN) 300 MG capsule       Cardiac/Vascular    Hypertension associated with diabetes    Relevant Orders    CBC auto differential    Comprehensive metabolic panel       Renal/    Hypokalemia    Relevant Medications    potassium chloride SA (K-DUR,KLOR-CON) 10 MEQ tablet    Other Relevant Orders    Comprehensive metabolic panel       Endocrine    Vitamin D deficiency    Morbid obesity with BMI of 40.0-44.9, adult    Type 2 diabetes mellitus with stage 3 chronic kidney disease, with long-term current use of insulin - Primary       Orthopedic    Chronic pain of right knee       Other    BAM on CPAP    Gait instability      Other Visit Diagnoses     Urinary tract infection without hematuria, site unspecified   (Active)      start macrobid X 7 days and get U/A and Cx    Relevant Medications    nitrofurantoin, macrocrystal-monohydrate, (MACROBID) 100 MG capsule    Other Relevant Orders    Urine culture    Urinalysis

## 2018-07-18 LAB — BACTERIA UR CULT: NORMAL

## 2018-07-19 ENCOUNTER — PATIENT MESSAGE (OUTPATIENT)
Dept: UROLOGY | Facility: CLINIC | Age: 68
End: 2018-07-19

## 2018-07-24 RX ORDER — ECONAZOLE NITRATE 10 MG/G
CREAM TOPICAL
Qty: 60 G | Refills: 3 | Status: SHIPPED | OUTPATIENT
Start: 2018-07-24 | End: 2018-08-29 | Stop reason: SDUPTHER

## 2018-07-25 ENCOUNTER — PATIENT MESSAGE (OUTPATIENT)
Dept: ADMINISTRATIVE | Facility: OTHER | Age: 68
End: 2018-07-25

## 2018-07-25 ENCOUNTER — PATIENT MESSAGE (OUTPATIENT)
Dept: SPEECH THERAPY | Facility: HOSPITAL | Age: 68
End: 2018-07-25

## 2018-07-25 ENCOUNTER — PATIENT MESSAGE (OUTPATIENT)
Dept: INTERNAL MEDICINE | Facility: CLINIC | Age: 68
End: 2018-07-25

## 2018-07-25 ENCOUNTER — PATIENT MESSAGE (OUTPATIENT)
Dept: FAMILY MEDICINE | Facility: CLINIC | Age: 68
End: 2018-07-25

## 2018-07-25 DIAGNOSIS — E11.59 HYPERTENSION ASSOCIATED WITH DIABETES: ICD-10-CM

## 2018-07-25 DIAGNOSIS — I15.2 HYPERTENSION ASSOCIATED WITH DIABETES: ICD-10-CM

## 2018-07-25 RX ORDER — VALSARTAN 160 MG/1
160 TABLET ORAL 2 TIMES DAILY
Qty: 180 TABLET | Refills: 0 | Status: SHIPPED | OUTPATIENT
Start: 2018-07-25 | End: 2018-07-25 | Stop reason: SDUPTHER

## 2018-07-25 RX ORDER — VALSARTAN 160 MG/1
160 TABLET ORAL 2 TIMES DAILY
Qty: 180 TABLET | Refills: 1 | Status: SHIPPED | OUTPATIENT
Start: 2018-07-25 | End: 2019-02-17 | Stop reason: SDUPTHER

## 2018-07-26 NOTE — TELEPHONE ENCOUNTER
Spoke to Joshua osuna/ [facility], Joshua report Valsartan is out of stock, requesting alternative, informed Joshua I will forward message to our covering physician alternative  will be sent electronically. Joshua verbalized understanding.

## 2018-07-27 ENCOUNTER — CLINICAL SUPPORT (OUTPATIENT)
Dept: INTERNAL MEDICINE | Facility: CLINIC | Age: 68
End: 2018-07-27
Payer: MEDICARE

## 2018-07-27 ENCOUNTER — LAB VISIT (OUTPATIENT)
Dept: LAB | Facility: HOSPITAL | Age: 68
End: 2018-07-27
Attending: NURSE PRACTITIONER
Payer: MEDICARE

## 2018-07-27 ENCOUNTER — PATIENT MESSAGE (OUTPATIENT)
Dept: FAMILY MEDICINE | Facility: CLINIC | Age: 68
End: 2018-07-27

## 2018-07-27 DIAGNOSIS — J30.2 SEASONAL ALLERGIC RHINITIS, UNSPECIFIED TRIGGER: ICD-10-CM

## 2018-07-27 DIAGNOSIS — E11.42 DIABETIC POLYNEUROPATHY ASSOCIATED WITH TYPE 2 DIABETES MELLITUS: ICD-10-CM

## 2018-07-27 DIAGNOSIS — R79.89 LOW VITAMIN D LEVEL: ICD-10-CM

## 2018-07-27 DIAGNOSIS — E89.0 POST-SURGICAL HYPOTHYROIDISM: ICD-10-CM

## 2018-07-27 DIAGNOSIS — E11.69 DYSLIPIDEMIA ASSOCIATED WITH TYPE 2 DIABETES MELLITUS: ICD-10-CM

## 2018-07-27 DIAGNOSIS — E78.5 DYSLIPIDEMIA ASSOCIATED WITH TYPE 2 DIABETES MELLITUS: ICD-10-CM

## 2018-07-27 LAB
25(OH)D3+25(OH)D2 SERPL-MCNC: 41 NG/ML
ALBUMIN SERPL BCP-MCNC: 3.3 G/DL
ALP SERPL-CCNC: 72 U/L
ALT SERPL W/O P-5'-P-CCNC: 26 U/L
ANION GAP SERPL CALC-SCNC: 13 MMOL/L
AST SERPL-CCNC: 27 U/L
BILIRUB SERPL-MCNC: 0.5 MG/DL
BUN SERPL-MCNC: 16 MG/DL
CALCIUM SERPL-MCNC: 9.9 MG/DL
CHLORIDE SERPL-SCNC: 101 MMOL/L
CHOLEST SERPL-MCNC: 140 MG/DL
CHOLEST/HDLC SERPL: 2.9 {RATIO}
CO2 SERPL-SCNC: 28 MMOL/L
CREAT SERPL-MCNC: 0.8 MG/DL
EST. GFR  (AFRICAN AMERICAN): >60 ML/MIN/1.73 M^2
EST. GFR  (NON AFRICAN AMERICAN): >60 ML/MIN/1.73 M^2
ESTIMATED AVG GLUCOSE: 154 MG/DL
ESTIMATED AVG GLUCOSE: 154 MG/DL
GLUCOSE SERPL-MCNC: 98 MG/DL
HBA1C MFR BLD HPLC: 7 %
HBA1C MFR BLD HPLC: 7 %
HDLC SERPL-MCNC: 48 MG/DL
HDLC SERPL: 34.3 %
LDLC SERPL CALC-MCNC: 65.2 MG/DL
NONHDLC SERPL-MCNC: 92 MG/DL
POTASSIUM SERPL-SCNC: 3.4 MMOL/L
PROT SERPL-MCNC: 7.8 G/DL
SODIUM SERPL-SCNC: 142 MMOL/L
TRIGL SERPL-MCNC: 134 MG/DL
TSH SERPL DL<=0.005 MIU/L-ACNC: 1.78 UIU/ML

## 2018-07-27 PROCEDURE — 36415 COLL VENOUS BLD VENIPUNCTURE: CPT | Mod: PO

## 2018-07-27 PROCEDURE — 80053 COMPREHEN METABOLIC PANEL: CPT

## 2018-07-27 PROCEDURE — 80061 LIPID PANEL: CPT

## 2018-07-27 PROCEDURE — 82306 VITAMIN D 25 HYDROXY: CPT

## 2018-07-27 PROCEDURE — 83036 HEMOGLOBIN GLYCOSYLATED A1C: CPT

## 2018-07-27 PROCEDURE — 84443 ASSAY THYROID STIM HORMONE: CPT

## 2018-07-27 PROCEDURE — 95115 IMMUNOTHERAPY ONE INJECTION: CPT | Mod: S$GLB,,, | Performed by: INTERNAL MEDICINE

## 2018-07-27 PROCEDURE — 99499 UNLISTED E&M SERVICE: CPT | Mod: S$GLB,,, | Performed by: ALLERGY & IMMUNOLOGY

## 2018-07-30 ENCOUNTER — PATIENT MESSAGE (OUTPATIENT)
Dept: ORTHOPEDICS | Facility: CLINIC | Age: 68
End: 2018-07-30

## 2018-07-31 ENCOUNTER — PATIENT OUTREACH (OUTPATIENT)
Dept: OTHER | Facility: OTHER | Age: 68
End: 2018-07-31

## 2018-07-31 NOTE — PROGRESS NOTES
Last 5 Patient Entered Readings                                      Current 30 Day Average: 132/65     Recent Readings 7/30/2018 7/29/2018 7/28/2018 7/27/2018 7/26/2018    SBP (mmHg) 124 140 133 131 131    DBP (mmHg) 66 65 58 61 60    Pulse 83 69 74 74 73        Digital Medicine: Health  Follow Up    Lifestyle Modifications:    1.Dietary Modifications (Sodium intake <2,000mg/day, food labels, dining out): Mrs. Jose has been preparing more meals at home. Encouragement provided.    2.Physical Activity: Patient reports that she has been going to the gym three days a week for at least 30 minutes each time.     3.Medication Therapy: Patient has been compliant with the medication regimen. Patient was able to have valsartan replaced for unaffected product. Prescription sent to both [facility] and [facility]. Keras was delayed, but was able to get prescription from [facility]. She was told she could continue affected product. Per PharmDFrances, instructed patient to stop. Patient verbalized understanding. She is worried about being unable to refill valsartan. Advised patient to call if she is unable to obtain refill.    4.Patient has the following medication side effects/concerns: None  (Frequency/Alleviating factors/Precipitating factors, etc.)     Follow up with Mrs. Vivienne Jose completed. Mrs. Jose is doing well. Patient continues to notice fluctuations in BP. She cannot attribute any lifestyle changes to spikes, as she has noticed higher BP hours after exercise, and lower BP after dining out. Encouraged patient to continue exercise regimen and preparing meals at home. No further questions or concerns. Will continue follow up to achieve health goals.

## 2018-08-01 ENCOUNTER — TELEPHONE (OUTPATIENT)
Dept: FAMILY MEDICINE | Facility: CLINIC | Age: 68
End: 2018-08-01

## 2018-08-01 DIAGNOSIS — E87.6 HYPOKALEMIA: Primary | ICD-10-CM

## 2018-08-03 ENCOUNTER — TELEPHONE (OUTPATIENT)
Dept: FAMILY MEDICINE | Facility: CLINIC | Age: 68
End: 2018-08-03

## 2018-08-03 NOTE — TELEPHONE ENCOUNTER
Pt came by to our office to let me know that she receive her Valsartan 90 days supply- non recall , she was informed by [employer] Pharmacist that it is safe to take.

## 2018-08-05 DIAGNOSIS — I15.2 HYPERTENSION ASSOCIATED WITH DIABETES: ICD-10-CM

## 2018-08-05 DIAGNOSIS — E11.59 HYPERTENSION ASSOCIATED WITH DIABETES: ICD-10-CM

## 2018-08-05 DIAGNOSIS — E78.5 HYPERLIPIDEMIA, UNSPECIFIED HYPERLIPIDEMIA TYPE: ICD-10-CM

## 2018-08-05 DIAGNOSIS — E03.9 HYPOTHYROIDISM, UNSPECIFIED TYPE: ICD-10-CM

## 2018-08-06 RX ORDER — LOVASTATIN 40 MG/1
TABLET ORAL
Qty: 90 TABLET | Refills: 1 | Status: SHIPPED | OUTPATIENT
Start: 2018-08-06 | End: 2019-02-03 | Stop reason: SDUPTHER

## 2018-08-06 RX ORDER — AMLODIPINE BESYLATE 5 MG/1
5 TABLET ORAL DAILY
Qty: 90 TABLET | Refills: 3 | Status: SHIPPED | OUTPATIENT
Start: 2018-08-06 | End: 2019-08-05 | Stop reason: SDUPTHER

## 2018-08-06 RX ORDER — LEVOTHYROXINE SODIUM 75 UG/1
TABLET ORAL
Qty: 90 TABLET | Refills: 3 | Status: SHIPPED | OUTPATIENT
Start: 2018-08-06 | End: 2019-01-29 | Stop reason: SDUPTHER

## 2018-08-06 NOTE — PROGRESS NOTES
Subjective:      Patient ID: Vivienne Jose is a 68 y.o. female.    Chief Complaint:  Diabetes Mellitus    History of Present Illness  Vivienne Jose is presenting today for DM type 2 & hypothyroidism follow up.     Has been having steroid injections every 3 months with her knee.   BG have been elevated with those injections and her bladder infection that she had last month.     A1c decreased since last visit to 7.0%, below goal of 7.5%    With regards to the diabetes:  Diagnosed with DM in her early 50s   She was told she had prediabetes in her 30s     Current regimen:  Levemir 16 units PM  Victoza 1.8 mg qd  Metformin 1000 mg BID  repaglinide 2 mg TID AC    Missed doses? No     2 times a day testing  Log reviewed: yes, See media tab        Eats 3 meals a day. Has improved diet greatly since last visit   Snacks rarely  Drinks water    Exercise - going to try to go to yoga      Hypoglycemic event? None   Corrects with juice   Has glucagon emergency kit     Known complications PN and nephropathy    Education - last visit: 05/2015     Diabetes Management Status  Statin: Taking  ACE/ARB: Taking  Screening or Prevention Patient's value Goal Complete/Controlled?   HgA1C Testing and Control   Lab Results   Component Value Date    HGBA1C 7.0 (H) 07/27/2018    HGBA1C 7.0 (H) 07/27/2018    Annually/Less than 7.5% Yes   Lipid profile : 07/27/2018 Annually Yes   LDL control Lab Results   Component Value Date    LDLCALC 65.2 07/27/2018    Annually/Less than 100 mg/dl  Yes   Nephropathy screening Lab Results   Component Value Date    LABMICR 4.0 07/27/2018     Lab Results   Component Value Date    PROTEINUA Negative 07/16/2018    Annually Yes   Blood pressure BP Readings from Last 1 Encounters:   08/08/18 124/68    Less than 140/90 Yes   Dilated retinal exam : 05/07/2018 Annually Yes   Foot exam   : 04/02/2018 Annually Yes     With regards to her hypothyroidism:  In 1978 she had thyroidectomy for goiter and multiple cysts      Current medication:  generic lt4 75 mcg     Results for HARVEY KING (MRN [id number]) as of 8/8/2018 14:10   Ref. Range 7/27/2018 07:24   TSH Latest Ref Range: 0.400 - 4.000 uIU/mL 1.780     takes thyroid medication properly without food first thing in the morning.     current symptoms:   Denies weight gain  Denies Fatigue   Denies Constipation   Denies Hair loss  Denies Brittle nails  Denies Mental fog     Last BMD dated 01/5/2018  Impression    Borderline osteopenia of the lumbar spine (slightly improved from prior exam) and osteopenia of the femoral necks (slightly decreased from prior exam).     Results for HARVEY KING (MRN [id number]) as of 8/8/2018 14:10   Ref. Range 7/27/2018 07:24   Vit D, 25-Hydroxy Latest Ref Range: 30 - 96 ng/mL 41     Has BAM using cpap    Last thyroid US in 2015: Impression     Small right lobe from previous thyroid surgery otherwise unremarkable.    RECOMMENDATION: Repeat ultrasound in 3 years.     Review of Systems   Constitutional: Negative for unexpected weight change.   Eyes: Negative for visual disturbance.   Respiratory: Negative for shortness of breath.    Cardiovascular: Negative for chest pain.   Gastrointestinal: Negative for abdominal pain.   Endocrine: Negative for cold intolerance, heat intolerance, polydipsia, polyphagia and polyuria.   Musculoskeletal: Positive for gait problem (uses cane). Negative for arthralgias.   Skin: Negative for wound.   Neurological: Negative for headaches.   Hematological: Does not bruise/bleed easily.   Psychiatric/Behavioral: Negative for sleep disturbance.     Objective:   Physical Exam   Neck: No thyromegaly present.   Cardiovascular: Normal rate.    No edema present   Pulmonary/Chest: Effort normal.   Abdominal: Soft.   Vitals reviewed.  Foot exam deferred done 12/11/2017  injection sites are ok. No lipo hypertropthy or atrophy    Body mass index is 43.95 kg/m².    Lab Review:   Lab Results   Component Value Date    HGBA1C 7.0 (H)  07/27/2018    HGBA1C 7.0 (H) 07/27/2018     Lab Results   Component Value Date    CHOL 140 07/27/2018    HDL 48 07/27/2018    LDLCALC 65.2 07/27/2018    TRIG 134 07/27/2018    CHOLHDL 34.3 07/27/2018     Lab Results   Component Value Date     07/27/2018    K 3.4 (L) 07/27/2018     07/27/2018    CO2 28 07/27/2018    GLU 98 07/27/2018    BUN 16 07/27/2018    CREATININE 0.8 07/27/2018    CALCIUM 9.9 07/27/2018    PROT 7.8 07/27/2018    ALBUMIN 3.3 (L) 07/27/2018    BILITOT 0.5 07/27/2018    ALKPHOS 72 07/27/2018    AST 27 07/27/2018    ALT 26 07/27/2018    ANIONGAP 13 07/27/2018    ESTGFRAFRICA >60.0 07/27/2018    EGFRNONAA >60.0 07/27/2018    TSH 1.780 07/27/2018     Assessment and Plan     1. Diabetic polyneuropathy associated with type 2 diabetes mellitus  Hemoglobin A1c    Lipid panel    Comprehensive metabolic panel    CBC auto differential    Microalbumin/creatinine urine ratio   2. Uncontrolled type 2 diabetes mellitus without complication, without long-term current use of insulin     3. Type 2 diabetes mellitus with stage 3 chronic kidney disease, with long-term current use of insulin     4. Post-surgical hypothyroidism  US Soft Tissue Head Neck Thyroid    TSH   5. BAM on CPAP     6. Osteopenia, unspecified location     7. Essential hypertension     8. Mixed hyperlipidemia     9. Morbid obesity with BMI of 40.0-44.9, adult     10. Vitamin D deficiency     11. Hypothyroidism due to acquired atrophy of thyroid       Diabetic polyneuropathy associated with type 2 diabetes mellitus  -- Continue gabapentin and following with podiatry.  -- Educated patient on proper comfortable foot wear, to always wear dry socks, never walk barefoot, never cut toenails too short, never test bath water with feet, encouraged patient to inspect feet daily for wounds., and if patient applies lotion to this feet to always go around the foot and not in between toes.     Type 2 diabetes mellitus with stage 3 chronic kidney  disease, with long-term current use of insulin  - A1c goal 7.5%.   -Reviewed goals of therapy are to get the best control we can without hypoglycemia  - Discussed diet and exercise at length today   Medication changes:   Continue: Metformin, victoza, Repaglinide & levemir   - Reviewed patient's current insulin regimen. Clarified proper insulin dose and timing in relation to meals, etc. Insulin injection sites and proper rotation instructed.     -Advised frequent self blood glucose monitoring.  Patient encouraged to document glucose results and bring them to every clinic visit    -Hypoglycemia precautions discussed. Instructed on precautions before driving.    -Close adherence to lifestyle changes recommended.   -Periodic follow ups for eye evaluations, foot care and dental care suggested.    Post-surgical hypothyroidism  - Clinically and biochemically euthyroid  - Continue lt4 75 mcg daily   - Goal is a normal TSH  - Check TFTs and adjust dosage accordingly   - Avoid exogenous hyperthyroidism as this can accelerate bone loss and increase risk of CV complications.  - Advised to take LT4 on an empty stomach with water and to wait 30-45 minutes before eating or taking other medications   - Reviewed that symptoms of hypothyroidism may not correlate with tsh, and a normal TSH is the goal of therapy.....  symptoms are not a justification for over treatment   - Schedule thyroid US due to recommendations     BAM on CPAP  - Continue CPAP use     Osteopenia  -- borderline osteopenic  -- continue Ca & vit D supplement     Hypertension associated with diabetes  - Tolerating ARB  - Controlled     Dyslipidemia associated with type 2 diabetes mellitus  - Controlled   -  statin per ADA recommendations    Morbid obesity with BMI of 40.0-44.9, adult  -- encouraged dietary and lifestyle modifications   -- emphasized weight loss goals     Vitamin D deficiency  - continue over the counter 2000 IU a day     Follow-up in about 6 months  (around 2/8/2019).  Labs & urine prior

## 2018-08-07 ENCOUNTER — PATIENT MESSAGE (OUTPATIENT)
Dept: ENDOCRINOLOGY | Facility: CLINIC | Age: 68
End: 2018-08-07

## 2018-08-08 ENCOUNTER — OFFICE VISIT (OUTPATIENT)
Dept: ENDOCRINOLOGY | Facility: CLINIC | Age: 68
End: 2018-08-08
Payer: MEDICARE

## 2018-08-08 VITALS
WEIGHT: 240.31 LBS | BODY MASS INDEX: 44.22 KG/M2 | HEIGHT: 62 IN | DIASTOLIC BLOOD PRESSURE: 68 MMHG | HEART RATE: 69 BPM | SYSTOLIC BLOOD PRESSURE: 124 MMHG

## 2018-08-08 DIAGNOSIS — G47.33 OSA ON CPAP: ICD-10-CM

## 2018-08-08 DIAGNOSIS — E11.42 DIABETIC POLYNEUROPATHY ASSOCIATED WITH TYPE 2 DIABETES MELLITUS: Primary | ICD-10-CM

## 2018-08-08 DIAGNOSIS — E89.0 POST-SURGICAL HYPOTHYROIDISM: ICD-10-CM

## 2018-08-08 DIAGNOSIS — Z79.4 TYPE 2 DIABETES MELLITUS WITH STAGE 3 CHRONIC KIDNEY DISEASE, WITH LONG-TERM CURRENT USE OF INSULIN: ICD-10-CM

## 2018-08-08 DIAGNOSIS — E55.9 VITAMIN D DEFICIENCY: ICD-10-CM

## 2018-08-08 DIAGNOSIS — E03.4 HYPOTHYROIDISM DUE TO ACQUIRED ATROPHY OF THYROID: ICD-10-CM

## 2018-08-08 DIAGNOSIS — M85.80 OSTEOPENIA, UNSPECIFIED LOCATION: ICD-10-CM

## 2018-08-08 DIAGNOSIS — E11.22 TYPE 2 DIABETES MELLITUS WITH STAGE 3 CHRONIC KIDNEY DISEASE, WITH LONG-TERM CURRENT USE OF INSULIN: ICD-10-CM

## 2018-08-08 DIAGNOSIS — N18.30 TYPE 2 DIABETES MELLITUS WITH STAGE 3 CHRONIC KIDNEY DISEASE, WITH LONG-TERM CURRENT USE OF INSULIN: ICD-10-CM

## 2018-08-08 DIAGNOSIS — I10 ESSENTIAL HYPERTENSION: ICD-10-CM

## 2018-08-08 DIAGNOSIS — E66.01 MORBID OBESITY WITH BMI OF 40.0-44.9, ADULT: ICD-10-CM

## 2018-08-08 DIAGNOSIS — E78.2 MIXED HYPERLIPIDEMIA: ICD-10-CM

## 2018-08-08 PROCEDURE — 3078F DIAST BP <80 MM HG: CPT | Mod: CPTII,S$GLB,, | Performed by: NURSE PRACTITIONER

## 2018-08-08 PROCEDURE — 99999 PR PBB SHADOW E&M-EST. PATIENT-LVL III: CPT | Mod: PBBFAC,,, | Performed by: NURSE PRACTITIONER

## 2018-08-08 PROCEDURE — 3074F SYST BP LT 130 MM HG: CPT | Mod: CPTII,S$GLB,, | Performed by: NURSE PRACTITIONER

## 2018-08-08 PROCEDURE — 3045F PR MOST RECENT HEMOGLOBIN A1C LEVEL 7.0-9.0%: CPT | Mod: CPTII,S$GLB,, | Performed by: NURSE PRACTITIONER

## 2018-08-08 PROCEDURE — 99214 OFFICE O/P EST MOD 30 MIN: CPT | Mod: S$GLB,,, | Performed by: NURSE PRACTITIONER

## 2018-08-08 RX ORDER — METHENAMINE HIPPURATE 1000 MG/1
TABLET ORAL
COMMUNITY
Start: 2018-06-21 | End: 2018-09-21

## 2018-08-08 RX ORDER — ASCORBIC ACID 500 MG
TABLET,CHEWABLE ORAL
COMMUNITY
Start: 2018-06-21 | End: 2019-02-20 | Stop reason: CLARIF

## 2018-08-14 ENCOUNTER — PATIENT MESSAGE (OUTPATIENT)
Dept: UROLOGY | Facility: CLINIC | Age: 68
End: 2018-08-14

## 2018-08-14 DIAGNOSIS — N39.0 URINARY TRACT INFECTION WITHOUT HEMATURIA, SITE UNSPECIFIED: Primary | ICD-10-CM

## 2018-08-15 ENCOUNTER — PATIENT OUTREACH (OUTPATIENT)
Dept: OTHER | Facility: OTHER | Age: 68
End: 2018-08-15

## 2018-08-15 ENCOUNTER — LAB VISIT (OUTPATIENT)
Dept: LAB | Facility: HOSPITAL | Age: 68
End: 2018-08-15
Attending: UROLOGY
Payer: MEDICARE

## 2018-08-15 DIAGNOSIS — N39.0 URINARY TRACT INFECTION WITHOUT HEMATURIA, SITE UNSPECIFIED: ICD-10-CM

## 2018-08-15 PROCEDURE — 87186 SC STD MICRODIL/AGAR DIL: CPT

## 2018-08-15 PROCEDURE — 87086 URINE CULTURE/COLONY COUNT: CPT

## 2018-08-15 PROCEDURE — 87077 CULTURE AEROBIC IDENTIFY: CPT

## 2018-08-15 PROCEDURE — 87088 URINE BACTERIA CULTURE: CPT

## 2018-08-15 NOTE — PROGRESS NOTES
Last 5 Patient Entered Readings                                      Current 30 Day Average: 131/65     Recent Readings 8/14/2018 8/13/2018 8/12/2018 8/11/2018 8/8/2018    SBP (mmHg) 130 123 132 123 140    DBP (mmHg) 67 65 71 68 67    Pulse 72 78 74 69 76        Patient's BP average is controlled based on 2017 ACC/AHA HTN guidelines of goal BP <130/80.      Ms. Jose is doing well. She just returned from [place] for a visit with her daughter and grandchildren. She is aware that he BP has increased slightly and will work to getting back to her routine of going to the gym regularly and eating more at home. She is unable to tolerate 10 mg of amlodipine due to BRYANNA. She does not want to add medications, so she plans to work harder on her lifestyle modifications to get to reducing medications. She continues to work on losing weight because she knows this will benefit her BP and blood sugar.     Patient's health , Maria Luisa Dior, will be following up every 3-4 weeks. I will continue to monitor regularly and will follow up in 2-3 months, sooner if BP begins to trend upward or downward.    Patient has my contact information and knows to call with any concerns or clinical changes.     Current HTN regimen:  Hypertension Medications             amLODIPine (NORVASC) 5 MG tablet Take 1 tablet (5 mg total) by mouth once daily.    chlorthalidone (HYGROTEN) 25 MG Tab Take 1 tablet (25 mg total) by mouth once daily.    valsartan (DIOVAN) 160 MG tablet Take 1 tablet (160 mg total) by mouth 2 (two) times daily.

## 2018-08-17 ENCOUNTER — HOSPITAL ENCOUNTER (OUTPATIENT)
Dept: RADIOLOGY | Facility: HOSPITAL | Age: 68
Discharge: HOME | End: 2018-08-17
Attending: NURSE PRACTITIONER
Payer: MEDICARE

## 2018-08-17 DIAGNOSIS — E89.0 POST-SURGICAL HYPOTHYROIDISM: ICD-10-CM

## 2018-08-17 PROCEDURE — 76536 US EXAM OF HEAD AND NECK: CPT | Mod: TC

## 2018-08-17 PROCEDURE — 76536 US EXAM OF HEAD AND NECK: CPT | Mod: 26,,, | Performed by: RADIOLOGY

## 2018-08-18 ENCOUNTER — PATIENT MESSAGE (OUTPATIENT)
Dept: ORTHOPEDICS | Facility: CLINIC | Age: 68
End: 2018-08-18

## 2018-08-18 LAB — BACTERIA UR CULT: NORMAL

## 2018-08-20 DIAGNOSIS — A49.9 BACTERIAL UTI: Primary | ICD-10-CM

## 2018-08-20 DIAGNOSIS — N39.0 BACTERIAL UTI: Primary | ICD-10-CM

## 2018-08-20 RX ORDER — CEPHALEXIN 500 MG/1
500 CAPSULE ORAL EVERY 12 HOURS
Qty: 20 CAPSULE | Refills: 0 | Status: SHIPPED | OUTPATIENT
Start: 2018-08-20 | End: 2018-08-30

## 2018-08-20 NOTE — PROGRESS NOTES
Patient of Dr. gra with UTI complaints  08/15/2018:   Urine Culture, Routine ESCHERICHIA COLI   >100,000 cfu/ml         Pan sensitive;   Rx for Keflex sent to [facility]

## 2018-08-21 ENCOUNTER — OFFICE VISIT (OUTPATIENT)
Dept: ORTHOPEDICS | Facility: CLINIC | Age: 68
End: 2018-08-21
Payer: MEDICARE

## 2018-08-21 VITALS — BODY MASS INDEX: 44.16 KG/M2 | WEIGHT: 240 LBS | HEIGHT: 62 IN

## 2018-08-21 DIAGNOSIS — M17.11 PRIMARY OSTEOARTHRITIS OF RIGHT KNEE: Primary | ICD-10-CM

## 2018-08-21 PROCEDURE — 20610 DRAIN/INJ JOINT/BURSA W/O US: CPT | Mod: RT,S$GLB,, | Performed by: ORTHOPAEDIC SURGERY

## 2018-08-21 PROCEDURE — 99499 UNLISTED E&M SERVICE: CPT | Mod: S$GLB,,, | Performed by: ORTHOPAEDIC SURGERY

## 2018-08-21 PROCEDURE — 99999 PR PBB SHADOW E&M-EST. PATIENT-LVL II: CPT | Mod: PBBFAC,,, | Performed by: ORTHOPAEDIC SURGERY

## 2018-08-21 NOTE — PROGRESS NOTES
Vivienne Jose presents to clinic today for the first right knee Synvisc injection.    Exam demonstrates no effusion in the  right knee, and the skin is intact.    GEN: Well developed, well nourished obese female. AAOX3. No acute distress.   Normocephalic, atraumatic.   SANCHEZ  Breathing unlabored.  Mood and affect appropriate.      Diagnosis: Osteoarthritis knee    Patient identified the planned injection site, the  right  knee was sterilly prepped in the standard fashion.  A 22-gauge needle was introduced into right knee joint from an miguel-lateral site without complication and knee was injected with 2 ml of Synvisc.  Sterile dressing was applied.  The patient was instructed to rest apply ice and use OTC analgesics as needed. Patient should resume activities as tolerated and to call with any problems.     We will see Vivienne Jose back next week for the second injection.

## 2018-08-21 NOTE — LETTER
August 21, 2018      Pj Gamboa MD  [address]  [address]  [place], [place] [address]           [facility]  [address]  [place], [place] [address]  Phone: [phone]          Patient: Vivienne Jose   MR Number: [id number]   YOB: 1950   Date of Visit: 8/21/2018       Dear Dr. Pj Gamboa:    Thank you for referring Vivienne Jose to me for evaluation. Attached you will find relevant portions of my assessment and plan of care.    If you have questions, please do not hesitate to call me. I look forward to following Vivienne Jose along with you.    Sincerely,    Rosa Ham PA-C    Enclosure  CC:  No Recipients    If you would like to receive this communication electronically, please contact [email or web address] or [phone] to request more information on [facility] access.    For providers and/or their staff who would like to refer a patient to [facility], please contact us through our one-stop-shop provider referral line, [facility] , at [phone].    If you feel you have received this communication in error or would no longer like to receive these types of communications, please e-mail [email or web address]

## 2018-08-28 ENCOUNTER — OFFICE VISIT (OUTPATIENT)
Dept: SLEEP MEDICINE | Facility: CLINIC | Age: 68
End: 2018-08-28
Payer: MEDICARE

## 2018-08-28 ENCOUNTER — OFFICE VISIT (OUTPATIENT)
Dept: ORTHOPEDICS | Facility: CLINIC | Age: 68
End: 2018-08-28
Payer: MEDICARE

## 2018-08-28 VITALS
SYSTOLIC BLOOD PRESSURE: 131 MMHG | WEIGHT: 241.88 LBS | HEART RATE: 76 BPM | DIASTOLIC BLOOD PRESSURE: 66 MMHG | BODY MASS INDEX: 44.23 KG/M2

## 2018-08-28 VITALS — BODY MASS INDEX: 44.16 KG/M2 | WEIGHT: 240 LBS | HEIGHT: 62 IN

## 2018-08-28 DIAGNOSIS — G47.33 OSA (OBSTRUCTIVE SLEEP APNEA): Primary | ICD-10-CM

## 2018-08-28 DIAGNOSIS — M17.11 PRIMARY OSTEOARTHRITIS OF RIGHT KNEE: Primary | ICD-10-CM

## 2018-08-28 PROCEDURE — 20610 DRAIN/INJ JOINT/BURSA W/O US: CPT | Mod: RT,S$GLB,, | Performed by: ORTHOPAEDIC SURGERY

## 2018-08-28 PROCEDURE — 3075F SYST BP GE 130 - 139MM HG: CPT | Mod: CPTII,S$GLB,, | Performed by: PSYCHIATRY & NEUROLOGY

## 2018-08-28 PROCEDURE — 99499 UNLISTED E&M SERVICE: CPT | Mod: S$GLB,,, | Performed by: ORTHOPAEDIC SURGERY

## 2018-08-28 PROCEDURE — 3078F DIAST BP <80 MM HG: CPT | Mod: CPTII,S$GLB,, | Performed by: PSYCHIATRY & NEUROLOGY

## 2018-08-28 PROCEDURE — 99999 PR PBB SHADOW E&M-EST. PATIENT-LVL V: CPT | Mod: PBBFAC,,, | Performed by: PSYCHIATRY & NEUROLOGY

## 2018-08-28 PROCEDURE — 99999 PR PBB SHADOW E&M-EST. PATIENT-LVL II: CPT | Mod: PBBFAC,,, | Performed by: ORTHOPAEDIC SURGERY

## 2018-08-28 PROCEDURE — 99204 OFFICE O/P NEW MOD 45 MIN: CPT | Mod: S$GLB,,, | Performed by: PSYCHIATRY & NEUROLOGY

## 2018-08-28 NOTE — PROGRESS NOTES
Vivienne Jose presents to clinic today for the second right knee Synvisc injection.    Exam demonstrates no effusion in the  right knee, and the skin is intact.    GEN: Well developed, well nourished female. AAOX3. No acute distress.   Normocephalic, atraumatic.   SANCHEZ  Breathing unlabored.  Mood and affect appropriate.      Diagnosis: Osteoarthritis knee    Patient identified the planned injection site, the  right  knee was sterilly prepped in the standard fashion.  A 22-gauge needle was introduced into right knee joint from an miguel-lateral site without complication and knee was injected with 2 ml of Synvisc.  Sterile dressing was applied.  The patient was instructed to rest apply ice and use OTC analgesics as needed. Patient should resume activities as tolerated and to call with any problems.     We will see Vivienne Jose back next week for the third injection.

## 2018-08-28 NOTE — PROGRESS NOTES
Vivienne Jose  was seen at the request of  Self, Aaareferral for sleep evaluation.    08/28/2018 INITIAL HISTORY OF PRESENT ILLNESS:  Vivienne Jose is a 68 y.o. female is here to be evaluated for a sleep disorder.       CHIEF COMPLAINT:      The patient's complaints include excessive daytime sleepiness, excessive daytime fatigue, snoring,  witnessed breathing pauses,  gasping for air in sleep and interrupted sleep since  2004, when she was diagnosed with severe BAM.    She has been well controlled for about 10 years.    She was previously seeing Dr. Wilkins.    HEr machine is 10 years old and needs replacement.    She noticed feeling more tired and waking up more frequently over the last year.    She reports occasional break through snoring.    She believes she has gained 50 lbs since titration.    She replaces her Durand mask regularly with a chin strap.    Reports  dry mouth and sore throat  Reports nasal congestion - Claritin and now Flonase and Asteline  Reports  morning headaches  Reports  interrupted sleep  Denies frequent leg movements  Denies symptoms concerning for parasomnia    The ESS ([place] Sleepiness Score) taken on initial visit is 15 /24    The patient had tonsillectomy in the past     06/11/2014: She comes with her new Resmed machine  today. Set at 12 cm H2O. No issue with mouthleak, dry mouth, break through snoring. Recent titration showed 12-13 cm H2O to be effective. Unfortunately she is still feeling sleepy - ESS 18/24. No PLMS found on sleep study.    CPAP pressure: 12 cm H2O  Mask comfort / fit:  Durand - fits fine    Pressure tolerance: fine; not using ramp   Humidification: fine   CPAP Interrogation:    Ave daily usage:7.8 hours /7days  Ave daily usage:7.8 hours /30days  Days >4 hours usage: 7 /7 days  Days >4 hours usage: 30/30 days   Machine condition: good     Frequent arousals were noted even when her respiratory events have been controlled.Her bedroom is dark and she denies physical  discomfort and polyuria. No PLMS reported on the sleep study. Denied leg discomfort.    08/28/2018: Ramon sleepy with inactivity. Significant early afternoon drowsiness.   CPAP 12 cm (211 on manometry). Study 2014 recommended 12-13 cm.  At times needs to rearranged her Durand mask at night.  Using chin strap. Did not like Dreamwear.No break through snoring.  Gained 2 lbs.    AHi 0.3  9 hrs per day  3 L/min leak  100% Compliance.    EPWORTH SLEEPINESS SCALE 8/23/2018   Sitting and reading 3   Watching TV 2   Sitting, inactive in a public place (e.g. a theatre or a meeting) 1   As a passenger in a car for an hour without a break 2   Lying down to rest in the afternoon when circumstances permit 3   Sitting and talking to someone 0   Sitting quietly after a lunch without alcohol 0   In a car, while stopped for a few minutes in traffic 0   Total score 11           SLEEP ROUTINE 08/28/2018 :    Bed partner:   Time to bed: 10 PM  Sleep onset latency: 5 min  Disruptions or awakenings: 4-5  Time to fall back into sleep: 1 min  Wakeup time: 5 AM   Perceived sleep quality: 2/5  Perceived total sleep time:  7  hours.  Daytime naps: 0  Weekend sleep routine: 7 AM  Exercise routine: N/A     PREVIOUS SLEEP STUDIES:     PSG/ SPLIT night study  In 2004 showed significant BAM with the AHI of 33.1/hour and SaO2 minimum of 88 %. Effective control of respiratory events was achieved at   10 cm H2O. Sleep efficiency was 79 %.    CPAP titration on 4/23/14: Adequate control of respiratory events was achieved at 12-13 cm of H2O. Weight 238 lbs. Frequent arousals were noted even when her respiratory events have been controlled.      DME: [facility]      PAST MEDICAL HISTORY:    Active Ambulatory Problems     Diagnosis Date Noted    Trigger middle finger of right hand 07/30/2012    Kidney stones 08/24/2012    HTN (hypertension) 08/24/2012    Post-surgical hypothyroidism 11/23/2012    CAD (coronary artery disease) 11/23/2012     Chronic allergic rhinitis 11/30/2012    Nuclear sclerosis - Both Eyes 04/02/2013    Carpal tunnel syndrome of right wrist 07/08/2014    Pain in limb 07/24/2014    Stiffness of joint 07/24/2014    Muscle weakness 07/24/2014    Proteinuria 02/03/2015    Dyslipidemia associated with type 2 diabetes mellitus 04/28/2015    BAM on CPAP 04/28/2015    Sacroiliitis 06/09/2015    Lumbar facet arthropathy 06/09/2015    Physical deconditioning 06/09/2015    Osteopenia 09/22/2015    Allergic rhinitis 09/22/2015    Type 2 diabetes mellitus, uncontrolled 09/22/2015    Hypertension associated with diabetes 09/22/2015    Hypothyroidism due to acquired atrophy of thyroid 09/22/2015    Vitamin D deficiency 09/22/2015    Sleep apnea 09/22/2015    Hypocalcemia 02/29/2016    Morbid obesity with BMI of 40.0-44.9, adult 02/29/2016    Recurrent HSV (herpes simplex virus) 03/21/2016    Chronic pain of right knee 05/02/2016    Gastroesophageal reflux disease 07/22/2016    Hyperlipidemia 07/22/2016    Nephrolithiasis 12/14/2016    Recurrent UTI 03/02/2017    Hypokalemia 04/24/2017    Diabetic polyneuropathy associated with type 2 diabetes mellitus 04/24/2017    Diverticulosis of intestine without bleeding 08/02/2017    Skin nodule 08/02/2017    Facet arthropathy, cervical 07/25/2011    Bilateral carotid artery disease 07/26/2011    Type 2 diabetes mellitus with stage 3 chronic kidney disease, with long-term current use of insulin 02/23/2018    Chronic kidney disease, stage III (moderate) 02/23/2018    Fatigue 06/21/2018    Gait instability 07/16/2018     Resolved Ambulatory Problems     Diagnosis Date Noted    Allergic conjunctivitis 11/23/2012    Screen for colon cancer 08/29/2014    UTI symptoms 11/05/2014    URI (upper respiratory infection) 01/07/2015    LBP radiating to left leg 06/12/2015    Routine general medical examination at a health care facility 09/22/2015    Body mass index  45.0-49.9, adult 09/22/2015    Nausea & vomiting 11/23/2015    Dysuria 02/29/2016    Soft tissue mass 08/04/2017    Low vitamin D level 03/14/2018     Past Medical History:   Diagnosis Date    Allergy     Angio-edema     Arthritis     Cataract     Cerebrovascular malformation     Colon polyps     Coronary artery disease     Diabetes mellitus, type 2     Diabetic peripheral neuropathy     GERD (gastroesophageal reflux disease)     Herpes infection     Hyperlipidemia     Hypertension     Hypothyroidism     Kidney stones     Nausea & vomiting 11/23/2015    Obesity, morbid     Ovarian cyst     Pyelonephritis, chronic     Seizure disorder, focal motor     Sleep apnea     Type II or unspecified type diabetes mellitus with neurological manifestations, uncontrolled(250.62)     Urinary tract infection     Urticaria     Vaginal infection                 PAST SURGICAL HISTORY:    Past Surgical History:   Procedure Laterality Date    CARPAL TUNNEL RELEASE Right 2014    COLONOSCOPY      CYST REMOVAL      skin; multiples    EXTRACORPOREAL SHOCK WAVE LITHOTRIPSY  2002    HYSTERECTOMY  1984    KIDNEY STONE SURGERY      THYROIDECTOMY  1977         TONSILLECTOMY, ADENOIDECTOMY      TRIGGER FINGER RELEASE      TUBAL LIGATION           FAMILY HISTORY:                Family History   Problem Relation Age of Onset    Cancer Father     Arthritis Father     Gout Father     Allergies Son     Allergic rhinitis Son     Skin cancer Mother     Macular degeneration Mother     Dementia Mother     Hypertension Mother     No Known Problems Daughter     Diabetes Daughter     No Known Problems Daughter     Glaucoma Maternal Aunt         Great Maternal Aunt    Leukemia Maternal Uncle     Amblyopia Neg Hx     Cataracts Neg Hx     Retinal detachment Neg Hx     Strabismus Neg Hx     Stroke Neg Hx     Thyroid disease Neg Hx     Kidney disease Neg Hx     Angioedema Neg Hx     Asthma Neg Hx      Atopy Neg Hx     Eczema Neg Hx     Immunodeficiency Neg Hx     Rhinitis Neg Hx     Urticaria Neg Hx        SOCIAL HISTORY:          Tobacco:   Social History     Tobacco Use   Smoking Status Never Smoker   Smokeless Tobacco Never Used       alcohol use:    Social History     Substance and Sexual Activity   Alcohol Use No    Alcohol/week: 0.0 oz                 Occupation:     ALLERGIES:    Review of patient's allergies indicates:   Allergen Reactions    Sulfa (sulfonamide antibiotics) Nausea Only    Ciprofloxacin     Januvia [sitagliptin]      abd pain    Lisinopril     Lotensin [benazepril]     Nexium [esomeprazole magnesium] Other (See Comments)     Gas       CURRENT MEDICATIONS:    Current Outpatient Medications   Medication Sig Dispense Refill    amLODIPine (NORVASC) 5 MG tablet Take 1 tablet (5 mg total) by mouth once daily. 90 tablet 3    ascorbic acid, vitamin C, (VITAMIN C) 500 mg Chew       aspirin (ECOTRIN) 81 MG EC tablet Take 81 mg by mouth once daily.      azelastine (ASTELIN) 137 mcg (0.1 %) nasal spray 2 sprays (274 mcg total) by Nasal route 2 (two) times daily. 90 mL 4    biotin 10,000 mcg Cap Take by mouth.      blood sugar diagnostic (TRUE METRIX GLUCOSE TEST STRIP) Strp TEST TWO TIMES DAILY 200 strip 6    blood-glucose meter Misc [employer] True Metrix Air meter 1 each 0    calcium carbonate-vitamin D3 600 mg(1,500mg) -100 unit Cap Take 1 tablet by mouth once daily.      cephALEXin (KEFLEX) 500 MG capsule Take 1 capsule (500 mg total) by mouth every 12 (twelve) hours. for 10 days 20 capsule 0    chlorthalidone (HYGROTEN) 25 MG Tab Take 1 tablet (25 mg total) by mouth once daily. 90 tablet 1    econazole nitrate 1 % cream APPLY TOPICALLY ONCE DAILY. MIX WITH HYDROCORTISONE CREAM 1% AS DIRECTED 60 g 3    fluticasone (FLONASE) 50 mcg/actuation nasal spray 2 sprays (100 mcg total) by Each Nare route once daily. 48 g 4    gabapentin (NEURONTIN) 300 MG capsule Take 2 capsules (600 mg  "total) by mouth every evening. 180 capsule 1    glucagon (human recombinant) inj 1mg/mL kit Inject 1 mL (1 mg total) into the muscle as needed. 1 kit 6    insulin detemir U-100 (LEVEMIR FLEXTOUCH U-100 INSULN) 100 unit/mL (3 mL) SubQ InPn pen Inject 16 Units into the skin every evening. 1 Box 6    ketotifen (ZADITOR) 0.025 % (0.035 %) ophthalmic solution Place 1-2 drops into both eyes once daily.      L.acid-B.bifidum-B.animal-FOS (PROBIOTIC COMPLEX) 25 billion cell -100 mg Cap Take 1 capsule by mouth once daily.      lancets (TRUEPLUS LANCETS) 28 gauge Misc Inject 1 lancet into the skin 2 (two) times daily before meals. 200 each 6    levothyroxine (SYNTHROID) 75 MCG tablet TAKE 1 TABLET EVERY DAY 90 tablet 3    liraglutide 0.6 mg/0.1 mL, 18 mg/3 mL, subq PNIJ (VICTOZA 3-TAWANA) 0.6 mg/0.1 mL (18 mg/3 mL) PnIj INJECT 1.8 MG INTO THE SKIN ONCE DAILY. 27 mL 11    loratadine (CLARITIN) 10 mg tablet Take 10 mg by mouth once daily.      lovastatin (MEVACOR) 40 MG tablet TAKE 1 TABLET NIGHTLY (SUBSTITUTED FOR MEVACOR) 90 tablet 1    magnesium oxide-Mg AA chelate (MAGNESIUM, AMINO ACID CHELATE,) 133 mg Tab Take by mouth as directed.       metFORMIN (GLUCOPHAGE) 1000 MG tablet Take 1 tablet (1,000 mg total) by mouth 2 (two) times daily with meals. 180 tablet 3    omeprazole (PRILOSEC) 20 MG capsule Take 1 capsule (20 mg total) by mouth daily as needed. 90 capsule 1    pen needle, diabetic (BD ULTRA-FINE EDUARDO PEN NEEDLES) 32 gauge x 5/32" Ndle USE WITH VICTOZA AND LEVEMIR (2 INJECTIONS EVERY DAY) 100 each 3    potassium chloride SA (K-DUR,KLOR-CON) 10 MEQ tablet Take 1 tablet (10 mEq total) by mouth once daily. 30 tablet 3    PREMARIN vaginal cream       repaglinide (PRANDIN) 2 MG tablet Take 1 tablet (2 mg total) by mouth 3 (three) times daily before meals. 270 tablet 3    valACYclovir (VALTREX) 500 MG tablet Take 1 tablet (500 mg total) by mouth once daily. 90 tablet 1    valsartan (DIOVAN) 160 MG tablet " Take 1 tablet (160 mg total) by mouth 2 (two) times daily. 180 tablet 1    diclofenac sodium (VOLTAREN) 1 % Gel Apply 2 g topically 4 (four) times daily. 3 Tube 3    methenamine (HIPREX) 1 gram Tab       silver sulfADIAZINE 1% (SILVADENE) 1 % cream Apply topically 2 (two) times daily. 25 g 1     Current Facility-Administered Medications   Medication Dose Route Frequency Provider Last Rate Last Dose    Allergy Mix   Subcutaneous 1 time in Clinic/HOD Ailin Mills MD                          REVIEW OF SYSTEMS:   Sleep related symptoms as per HPI    reports weight gain - 50 lbs since last titration  Denies dyspnea  Denies palpitations  Denies acid reflux   Reports occasional polyuria  Reports  mood diturbance  Denies  anemia  Reports  muscle pain - joint stiffness due to arthritis    Otherwise, a balance of 10 systems reviewed is negative.    PHYSICAL EXAM:  /66   Pulse 76   Wt 109.7 kg (241 lb 13.5 oz)   LMP  (LMP Unknown)   BMI 44.23 kg/m²   GENERAL: Overweight body habitus, well groomed.  HEENT:   HEENT:  Conjunctivae are non-erythematous; Pupils equal, round, and reactive to light; Nose is symmetrical; Nasal mucosa is pink and moist; Nasal airflow is diminished on the right; Posterior pharynx is pink; Modified Mallampati:IV; Posterior palate is low; Tonsils absent; Uvula is elongated;Tongue is broad; Dentition is fair; No TMJ tenderness; Jaw opening and protrusion without click and without discomfort.  NECK: Supple. Neck circumference is 17 inches. No thyromegaly. No palpable nodes.     SKIN: On face and neck: No abrasions, no rashes, no lesions.  No subcutaneous nodules are palpable.  RESPIRATORY: Chest is clear to auscultation.  Normal chest expansion and non-labored breathing at rest.  CARDIOVASCULAR: Normal S1, S2.  No murmurs, gallops or rubs. No carotid bruits bilaterally.  No edema. No clubbing. No cyanosis.    NEURO: Oriented to time, place and person. Normal attention span and  "concentration. Gait normal.    PSYCH: Affect is full. Mood is normal  MUSCULOSKELETAL: Moves 4 extremities. Gait normal.       ASSESSMENT:    1.Previously diagnosed severe  BAM (obstructive sleep apnea). The patient symptomatically has  excessive daytime sleepiness, snoring,  witnessed breathing pauses, excessive daytime fatigue, gasping for air in sleep and interrupted sleep  with exam findings of "a crowded oral airway and elevated body mass index. The patient has medical co-morbidities of CAD, diabetes, heart disease and hypertension,  which can be worsened by BAM. This warrants treatment. Tolerates 12 cm H2O well on her new Resmed machine. CPAP 12-13 cm was shown to be adequate on recent re-titration.  Good AHI on download, but significant afternoon sleepiness. Gabapentin 600 mg at bedtime may also be contributing.        PLAN:      I have increased CPAP to 13 cm H2O. It looks like further increase in pressure caused increase in AHI due to increased leak.   CPAP titration due to residual sleepiness.      We may consider further empiric increase in CPAP settings in future, if sleepiness does not improve.    I discussed trial of Nuvigil for residual sleepiness - side effects (BP elevation, possible cardiac) were discussed. She was recommended to get a BP monometer and journal her BP.   50 mg Nuvigil was prescribed. She may try 1/2 pill for the first time.      More than 25 minutes of this 45 minutes visit was spent in counseling: during our discussion today, we talked about the etiology of BAM as well as the potential ramifications of untreated sleep apnea, which could include daytime sleepiness, hypertension, heart disease and/or stroke.  We discussed potential treatment options, which could include weight loss, body positioning, continuous positive airway pressure (CPAP), or referral for surgical consideration. Meanwhile, she  is urged to avoid supine sleep, weight gain and alcoholic beverages since all of these " can worsen BAM.     Precautions: The patient was advised to abstain from driving should he feel sleepy or drowsy.    Follow up: MD/NP  1 month to re-evaluate EDS after increasing CPAP and possibly the trial of Nuvigil.     Thank you for allowing me the opportunity to participate in the care of your patient.

## 2018-08-28 NOTE — PATIENT INSTRUCTIONS
SLEEP LAB ([facility] or [facility]) will contact you to schedulethe sleep study. Their number is [phone] (ext 2). Please call them if you do not hear from them in 10 business days from now.  The [facility] is located on 7th floor of the [facility]; [facility] is located in [facility].    SLEEP CLINIC (my assistant) will call you when the sleep study results are ready - if you have not heard from us by 2 weeks from the date of the study, please call [phone] (ext 1) or you can use [facility] to contact me.    You are advised to abstain from driving should you feel sleepy or drowsy.

## 2018-08-29 ENCOUNTER — CLINICAL SUPPORT (OUTPATIENT)
Dept: INTERNAL MEDICINE | Facility: CLINIC | Age: 68
End: 2018-08-29
Payer: MEDICARE

## 2018-08-29 ENCOUNTER — OFFICE VISIT (OUTPATIENT)
Dept: DERMATOLOGY | Facility: CLINIC | Age: 68
End: 2018-08-29
Payer: MEDICARE

## 2018-08-29 VITALS — WEIGHT: 241 LBS | BODY MASS INDEX: 44.08 KG/M2

## 2018-08-29 DIAGNOSIS — L30.4 INTERTRIGO: Primary | ICD-10-CM

## 2018-08-29 DIAGNOSIS — L81.4 LENTIGINES: ICD-10-CM

## 2018-08-29 DIAGNOSIS — J30.9 CHRONIC ALLERGIC RHINITIS: ICD-10-CM

## 2018-08-29 DIAGNOSIS — L82.1 SEBORRHEIC KERATOSES: ICD-10-CM

## 2018-08-29 DIAGNOSIS — H61.002 CHONDRODERMATITIS NODULARIS HELICIS, LEFT: ICD-10-CM

## 2018-08-29 PROCEDURE — 95115 IMMUNOTHERAPY ONE INJECTION: CPT | Mod: S$GLB,,, | Performed by: FAMILY MEDICINE

## 2018-08-29 PROCEDURE — 99499 UNLISTED E&M SERVICE: CPT | Mod: S$GLB,,, | Performed by: ALLERGY & IMMUNOLOGY

## 2018-08-29 PROCEDURE — 99213 OFFICE O/P EST LOW 20 MIN: CPT | Mod: S$GLB,,, | Performed by: DERMATOLOGY

## 2018-08-29 PROCEDURE — 99999 PR PBB SHADOW E&M-EST. PATIENT-LVL III: CPT | Mod: PBBFAC,,, | Performed by: DERMATOLOGY

## 2018-08-29 RX ORDER — ECONAZOLE NITRATE 10 MG/G
CREAM TOPICAL
Qty: 60 G | Refills: 3 | Status: SHIPPED | OUTPATIENT
Start: 2018-08-29 | End: 2019-07-25

## 2018-08-29 RX ORDER — ECONAZOLE NITRATE 10 MG/G
CREAM TOPICAL
Qty: 60 G | Refills: 3 | Status: SHIPPED | OUTPATIENT
Start: 2018-08-29 | End: 2018-08-29 | Stop reason: SDUPTHER

## 2018-08-29 NOTE — PROGRESS NOTES
Subjective:       Patient ID:  Vivienne Jose is a 68 y.o. female who presents for   Chief Complaint   Patient presents with    Mole     UBSE     History of Present Illness: The patient presents with chief complaint of spot.  Location: right lateral abdomehn  Duration: months  Signs/Symptoms: irritated    Prior treatments: none  Her intertrigo is under control with current tx spectazole and hc cream          Review of Systems   Constitutional: Negative for fever.   Skin: Positive for itching and rash.   Hematologic/Lymphatic: Does not bruise/bleed easily.        Objective:    Physical Exam   Constitutional: She appears well-developed and well-nourished. No distress.   Neurological: She is alert and oriented to person, place, and time. She is not disoriented.   Psychiatric: She has a normal mood and affect.   Skin:   Areas Examined (abnormalities noted in diagram):   Head / Face Inspection Performed  Neck Inspection Performed  Chest / Axilla Inspection Performed  Abdomen Inspection Performed  Back Inspection Performed  RUE Inspected  LUE Inspection Performed  RLE Inspected  LLE Inspection Performed                   Diagram Legend     Erythematous scaling macule/papule c/w actinic keratosis       Vascular papule c/w angioma      Pigmented verrucoid papule/plaque c/w seborrheic keratosis      Yellow umbilicated papule c/w sebaceous hyperplasia      Irregularly shaped tan macule c/w lentigo     1-2 mm smooth white papules consistent with Milia      Movable subcutaneous cyst with punctum c/w epidermal inclusion cyst      Subcutaneous movable cyst c/w pilar cyst      Firm pink to brown papule c/w dermatofibroma      Pedunculated fleshy papule(s) c/w skin tag(s)      Evenly pigmented macule c/w junctional nevus     Mildly variegated pigmented, slightly irregular-bordered macule c/w mildly atypical nevus      Flesh colored to evenly pigmented papule c/w intradermal nevus       Pink pearly papule/plaque c/w basal cell  "carcinoma      Erythematous hyperkeratotic cursted plaque c/w SCC      Surgical scar with no sign of skin cancer recurrence      Open and closed comedones      Inflammatory papules and pustules      Verrucoid papule consistent consistent with wart     Erythematous eczematous patches and plaques     Dystrophic onycholytic nail with subungual debris c/w onychomycosis     Umbilicated papule    Erythematous-base heme-crusted tan verrucoid plaque consistent with inflamed seborrheic keratosis     Erythematous Silvery Scaling Plaque c/w Psoriasis     See annotation      Assessment / Plan:        Intertrigo  -     econazole nitrate 1 % cream; Mix with hydrocortisone cream 1% 30 grams use bid prn  Dispense: 60 g; Refill: 3 use for pulse therapy on weekends  hibiclens cleanser weekly  lamisil cream without hc during week   Seborrheic keratoses  reassurance      Lentigines  The "ABCD" rules to observe pigmented lesions were reviewed.    chodrodermatitis left ear  Decrease pressure on area           Follow-up in about 1 year (around 8/29/2019).  "

## 2018-08-31 ENCOUNTER — TELEPHONE (OUTPATIENT)
Dept: FAMILY MEDICINE | Facility: CLINIC | Age: 68
End: 2018-08-31

## 2018-08-31 ENCOUNTER — PATIENT MESSAGE (OUTPATIENT)
Dept: INTERNAL MEDICINE | Facility: CLINIC | Age: 68
End: 2018-08-31

## 2018-08-31 ENCOUNTER — LAB VISIT (OUTPATIENT)
Dept: LAB | Facility: HOSPITAL | Age: 68
End: 2018-08-31
Attending: INTERNAL MEDICINE
Payer: MEDICARE

## 2018-08-31 DIAGNOSIS — E87.6 HYPOKALEMIA: ICD-10-CM

## 2018-08-31 DIAGNOSIS — E87.6 HYPOKALEMIA: Primary | ICD-10-CM

## 2018-08-31 LAB
ANION GAP SERPL CALC-SCNC: 8 MMOL/L
BUN SERPL-MCNC: 15 MG/DL
CALCIUM SERPL-MCNC: 9.3 MG/DL
CHLORIDE SERPL-SCNC: 100 MMOL/L
CO2 SERPL-SCNC: 32 MMOL/L
CREAT SERPL-MCNC: 0.8 MG/DL
EST. GFR  (AFRICAN AMERICAN): >60 ML/MIN/1.73 M^2
EST. GFR  (NON AFRICAN AMERICAN): >60 ML/MIN/1.73 M^2
GLUCOSE SERPL-MCNC: 136 MG/DL
POTASSIUM SERPL-SCNC: 3.3 MMOL/L
SODIUM SERPL-SCNC: 140 MMOL/L

## 2018-08-31 PROCEDURE — 80048 BASIC METABOLIC PNL TOTAL CA: CPT

## 2018-08-31 PROCEDURE — 36415 COLL VENOUS BLD VENIPUNCTURE: CPT | Mod: PO

## 2018-08-31 RX ORDER — POTASSIUM CHLORIDE 750 MG/1
TABLET, EXTENDED RELEASE ORAL
Qty: 40 TABLET | Refills: 1 | Status: SHIPPED | OUTPATIENT
Start: 2018-08-31 | End: 2018-11-16 | Stop reason: SDUPTHER

## 2018-08-31 NOTE — TELEPHONE ENCOUNTER
Notify pt. That potassium continues to be low; have pt. Take potassium 10 meq PO QD except on M,W and F, on which she should take 2 tabs (20 meq) PO QD; repeat BMP in 1 month; order entered, please schedule; refill on potassium sent to  [facility]

## 2018-09-04 ENCOUNTER — OFFICE VISIT (OUTPATIENT)
Dept: ORTHOPEDICS | Facility: CLINIC | Age: 68
End: 2018-09-04
Payer: MEDICARE

## 2018-09-04 VITALS — WEIGHT: 241 LBS | HEIGHT: 62 IN | BODY MASS INDEX: 44.35 KG/M2

## 2018-09-04 DIAGNOSIS — M17.11 PRIMARY OSTEOARTHRITIS OF RIGHT KNEE: Primary | ICD-10-CM

## 2018-09-04 PROCEDURE — 99499 UNLISTED E&M SERVICE: CPT | Mod: S$PBB,,, | Performed by: ORTHOPAEDIC SURGERY

## 2018-09-04 PROCEDURE — 99214 OFFICE O/P EST MOD 30 MIN: CPT | Mod: PBBFAC,25,PO | Performed by: ORTHOPAEDIC SURGERY

## 2018-09-04 PROCEDURE — 20610 DRAIN/INJ JOINT/BURSA W/O US: CPT | Mod: S$PBB,RT,, | Performed by: ORTHOPAEDIC SURGERY

## 2018-09-04 PROCEDURE — 20610 DRAIN/INJ JOINT/BURSA W/O US: CPT | Mod: PBBFAC,PO | Performed by: ORTHOPAEDIC SURGERY

## 2018-09-04 PROCEDURE — 99999 PR PBB SHADOW E&M-EST. PATIENT-LVL IV: CPT | Mod: PBBFAC,,, | Performed by: ORTHOPAEDIC SURGERY

## 2018-09-04 RX ADMIN — Medication 16 MG: at 02:09

## 2018-09-04 NOTE — PROGRESS NOTES
Vivienne Jose presents to clinic today for the third right knee Synvisc injection.    Exam demonstrates no effusion in the  right knee, and the skin is intact.    GEN: Well developed, well nourished  female. AAOX3. No acute distress.   Normocephalic, atraumatic.   SANCHEZ  Breathing unlabored.  Mood and affect  appropriate.     Diagnosis: Osteoarthritis knee    Patient identified the planned injection site, the  right  knee was sterilly prepped in the standard fashion.  A 22-gauge needle was introduced into right knee joint from an miguel-lateral site without complication and knee was injected with 2 ml of Synvisc.  Sterile dressing was applied.  The patient was instructed to rest apply ice and use OTC analgesics as needed. Patient should resume activities as tolerated and to call with any problems.      Follow-up 3 months for right knee.

## 2018-09-17 ENCOUNTER — PATIENT OUTREACH (OUTPATIENT)
Dept: OTHER | Facility: OTHER | Age: 68
End: 2018-09-17

## 2018-09-17 NOTE — PROGRESS NOTES
"Last 5 Patient Entered Readings                                      Current 30 Day Average: 133/67     Recent Readings 9/16/2018 9/16/2018 9/15/2018 9/14/2018 9/13/2018    SBP (mmHg) 153 144 134 129 127    DBP (mmHg) 76 69 68 63 71    Pulse 72 75 76 72 68        Digital Medicine: Health  Follow Up    Lifestyle Modifications:    1.Dietary Modifications (Sodium intake <2,000mg/day, food labels, dining out): Mrs. Jose continues to try to prepare more meals at home to help with limiting her salt intake. Encouragement provided.     2.Physical Activity: Patient states that she has returned to the gym since getting back from [place]. She had a great time visiting, and will be going back for another visit early October.     3.Medication Therapy: Patient has been compliant with the medication regimen.    4.Patient has the following medication side effects/concerns: None  (Frequency/Alleviating factors/Precipitating factors, etc.)     Follow up with Mrs. Vivienne Jose completed. Mrs. Jose is doing okay. Patient states that her BP was a "little bit of an issue last night", but attributes it to dietary indiscretions during the day. She was going to recheck it for a third time, but her power went out so she decided not to. She will send another readings this afternoon. No further questions or concerns. Will continue to follow up to achieve health goals.        "

## 2018-09-18 ENCOUNTER — OFFICE VISIT (OUTPATIENT)
Dept: ORTHOPEDICS | Facility: CLINIC | Age: 68
End: 2018-09-18
Payer: MEDICARE

## 2018-09-18 ENCOUNTER — PATIENT MESSAGE (OUTPATIENT)
Dept: ORTHOPEDICS | Facility: CLINIC | Age: 68
End: 2018-09-18

## 2018-09-18 ENCOUNTER — TELEPHONE (OUTPATIENT)
Dept: SLEEP MEDICINE | Facility: OTHER | Age: 68
End: 2018-09-18

## 2018-09-18 VITALS — WEIGHT: 241 LBS | HEIGHT: 62 IN | BODY MASS INDEX: 44.35 KG/M2

## 2018-09-18 DIAGNOSIS — M54.32 SCIATICA OF LEFT SIDE: Primary | ICD-10-CM

## 2018-09-18 DIAGNOSIS — M54.16 LUMBAR RADICULOPATHY, CHRONIC: ICD-10-CM

## 2018-09-18 PROCEDURE — 1101F PT FALLS ASSESS-DOCD LE1/YR: CPT | Mod: CPTII,,, | Performed by: ORTHOPAEDIC SURGERY

## 2018-09-18 PROCEDURE — 99999 PR PBB SHADOW E&M-EST. PATIENT-LVL III: CPT | Mod: PBBFAC,,, | Performed by: ORTHOPAEDIC SURGERY

## 2018-09-18 PROCEDURE — 99213 OFFICE O/P EST LOW 20 MIN: CPT | Mod: PBBFAC,PN | Performed by: ORTHOPAEDIC SURGERY

## 2018-09-18 PROCEDURE — 99213 OFFICE O/P EST LOW 20 MIN: CPT | Mod: S$PBB,,, | Performed by: ORTHOPAEDIC SURGERY

## 2018-09-18 RX ORDER — METHYLPREDNISOLONE 4 MG/1
TABLET ORAL
Qty: 1 PACKAGE | Refills: 1 | Status: SHIPPED | OUTPATIENT
Start: 2018-09-18 | End: 2018-11-02

## 2018-09-18 NOTE — PROGRESS NOTES
Subjective:      Patient ID: Vivienne Jose is a 68 y.o. female.    Chief Complaint: Pain of the Lower Back      HPI: Vivienne Jose is for evaluation and treatment of intermittent Left leg pain for 5 years duration. The patient denies any relevant history of injury. They describe pain in the lower back and lateral hip that does radiate down the leg. Pain is associated with numbness/tingling. The patient denies weakness in the involved extremity. Pain is aggravated by standing or walking for long periods of time and relived by rest. Patient has been previously treated for this problem with physical therapy.  Patient reports improvement in symptoms with physical therapy in the past. Ambulation reportedly has not been impaired. Distal neurologic symptoms and incontinence are absent.    Past Medical History:   Diagnosis Date    Allergy     Angio-edema     Arthritis     Cataract     Cerebrovascular malformation     Colon polyps     Coronary artery disease     Diabetes mellitus, type 2     Diabetic peripheral neuropathy     GERD (gastroesophageal reflux disease)     Herpes infection     Hyperlipidemia     Hypertension     Hypothyroidism     Kidney stones     Nausea & vomiting 11/23/2015    Obesity, morbid     Ovarian cyst     Pyelonephritis, chronic     Seizure disorder, focal motor     Sleep apnea     Type II or unspecified type diabetes mellitus with neurological manifestations, uncontrolled(250.62)     Urinary tract infection     Urticaria     Vaginal infection        Current Outpatient Medications:     amLODIPine (NORVASC) 5 MG tablet, Take 1 tablet (5 mg total) by mouth once daily., Disp: 90 tablet, Rfl: 3    ascorbic acid, vitamin C, (VITAMIN C) 500 mg Chew, , Disp: , Rfl:     aspirin (ECOTRIN) 81 MG EC tablet, Take 81 mg by mouth once daily., Disp: , Rfl:     azelastine (ASTELIN) 137 mcg (0.1 %) nasal spray, 2 sprays (274 mcg total) by Nasal route 2 (two) times daily., Disp: 90 mL, Rfl:  4    biotin 10,000 mcg Cap, Take by mouth., Disp: , Rfl:     blood sugar diagnostic (TRUE METRIX GLUCOSE TEST STRIP) Strp, TEST TWO TIMES DAILY, Disp: 200 strip, Rfl: 6    blood-glucose meter Mis, [employer] True Metrix Air meter, Disp: 1 each, Rfl: 0    calcium carbonate-vitamin D3 600 mg(1,500mg) -100 unit Cap, Take 1 tablet by mouth once daily., Disp: , Rfl:     chlorthalidone (HYGROTEN) 25 MG Tab, Take 1 tablet (25 mg total) by mouth once daily., Disp: 90 tablet, Rfl: 1    econazole nitrate 1 % cream, Mix with hydrocortisone cream 1% 30 grams use bid prn, Disp: 60 g, Rfl: 3    fluticasone (FLONASE) 50 mcg/actuation nasal spray, 2 sprays (100 mcg total) by Each Nare route once daily., Disp: 48 g, Rfl: 4    gabapentin (NEURONTIN) 300 MG capsule, Take 2 capsules (600 mg total) by mouth every evening., Disp: 180 capsule, Rfl: 1    glucagon (human recombinant) inj 1mg/mL kit, Inject 1 mL (1 mg total) into the muscle as needed., Disp: 1 kit, Rfl: 6    insulin detemir U-100 (LEVEMIR FLEXTOUCH U-100 INSULN) 100 unit/mL (3 mL) SubQ InPn pen, Inject 16 Units into the skin every evening., Disp: 1 Box, Rfl: 6    ketotifen (ZADITOR) 0.025 % (0.035 %) ophthalmic solution, Place 1-2 drops into both eyes once daily., Disp: , Rfl:     L.acid-B.bifidum-B.animal-FOS (PROBIOTIC COMPLEX) 25 billion cell -100 mg Cap, Take 1 capsule by mouth once daily., Disp: , Rfl:     lancets (TRUEPLUS LANCETS) 28 gauge Misc, Inject 1 lancet into the skin 2 (two) times daily before meals., Disp: 200 each, Rfl: 6    levothyroxine (SYNTHROID) 75 MCG tablet, TAKE 1 TABLET EVERY DAY, Disp: 90 tablet, Rfl: 3    liraglutide 0.6 mg/0.1 mL, 18 mg/3 mL, subq PNIJ (VICTOZA 3-TAWAAN) 0.6 mg/0.1 mL (18 mg/3 mL) Mason, INJECT 1.8 MG INTO THE SKIN ONCE DAILY., Disp: 27 mL, Rfl: 11    loratadine (CLARITIN) 10 mg tablet, Take 10 mg by mouth once daily., Disp: , Rfl:     lovastatin (MEVACOR) 40 MG tablet, TAKE 1 TABLET NIGHTLY (SUBSTITUTED FOR MEVACOR),  "Disp: 90 tablet, Rfl: 1    magnesium oxide-Mg AA chelate (MAGNESIUM, AMINO ACID CHELATE,) 133 mg Tab, Take by mouth as directed. , Disp: , Rfl:     metFORMIN (GLUCOPHAGE) 1000 MG tablet, Take 1 tablet (1,000 mg total) by mouth 2 (two) times daily with meals., Disp: 180 tablet, Rfl: 3    methenamine (HIPREX) 1 gram Tab, , Disp: , Rfl:     omeprazole (PRILOSEC) 20 MG capsule, Take 1 capsule (20 mg total) by mouth daily as needed., Disp: 90 capsule, Rfl: 1    pen needle, diabetic (BD ULTRA-FINE EDUARDO PEN NEEDLES) 32 gauge x 5/32" Ndle, USE WITH VICTOZA AND LEVEMIR (2 INJECTIONS EVERY DAY), Disp: 100 each, Rfl: 3    potassium chloride SA (K-DUR,KLOR-CON) 10 MEQ tablet, Take 10 meq PO QD except on Mondays, Wednesdays and Fridays, on which she should take 2 tabs (20 meq) QD, Disp: 40 tablet, Rfl: 1    PREMARIN vaginal cream, , Disp: , Rfl:     repaglinide (PRANDIN) 2 MG tablet, Take 1 tablet (2 mg total) by mouth 3 (three) times daily before meals., Disp: 270 tablet, Rfl: 3    silver sulfADIAZINE 1% (SILVADENE) 1 % cream, Apply topically 2 (two) times daily., Disp: 25 g, Rfl: 1    valACYclovir (VALTREX) 500 MG tablet, Take 1 tablet (500 mg total) by mouth once daily., Disp: 90 tablet, Rfl: 1    valsartan (DIOVAN) 160 MG tablet, Take 1 tablet (160 mg total) by mouth 2 (two) times daily., Disp: 180 tablet, Rfl: 1    diclofenac sodium (VOLTAREN) 1 % Gel, Apply 2 g topically 4 (four) times daily., Disp: 3 Tube, Rfl: 3    methylPREDNISolone (MEDROL DOSEPACK) 4 mg tablet, use as directed, Disp: 1 Package, Rfl: 1    Current Facility-Administered Medications:     Allergy Mix, , Subcutaneous, 1 time in Clinic/HOD, Ailin Mills MD  Review of patient's allergies indicates:   Allergen Reactions    Sulfa (sulfonamide antibiotics) Nausea Only    Ciprofloxacin     Januvia [sitagliptin]      abd pain    Lisinopril     Lotensin [benazepril]     Nexium [esomeprazole magnesium] Other (See Comments)     Gas       Ht " "5' 2" (1.575 m)   Wt 109.3 kg (241 lb)   LMP  (LMP Unknown)   BMI 44.08 kg/m²     ROS      Objective:       Left HIP:  The patient walks without a limp.   The skin over the hip is intact.  Tenderness to palpation absent.  Range of motion- full, fluid and painless  Resisted SLR Positive.  Pain with rotation Negative.  Sciatic tension findings Positive  Pulses DP present, PT present  Motor strength normal.  Sensation diminished in her toes.    GENERAL:   Well developed, well nourished female in no acute distress.   Alert and oriented.   Breathing is unlabored.   Mood and affect are normal.      Assessment:     Imaging:  No new imaging        1. Sciatica of left side    2. Lumbar radiculopathy, chronic          Plan:       I explained the nature of the problem to the patient.   I recommend Medrol dose pack. Pt given instructions to take as prescribed. If after the first dose pack symptoms are improved but not resolved they may take the second dose pack.    I also believe the patient will benefit from physiotherapy and have set her up with the [facility].  Diabetes precautions given. Patient states she will inform her endocrinologist of upcoming oral steroid use for diabetes medication recommendations.  Activity modification and lifting restrictions explained.  MRI ordered given duration of symptoms.     Orders Placed This Encounter    MRI Lumbar Spine Without Contrast    Ambulatory consult to [facility]    methylPREDNISolone (MEDROL DOSEPACK) 4 mg tablet     Follow-up in about 2 months (around 11/18/2018).        "

## 2018-09-19 ENCOUNTER — HOSPITAL ENCOUNTER (OUTPATIENT)
Dept: SLEEP MEDICINE | Facility: HOSPITAL | Age: 68
Discharge: HOME | End: 2018-09-19
Attending: PSYCHIATRY & NEUROLOGY
Payer: MEDICARE

## 2018-09-19 DIAGNOSIS — G47.33 OSA (OBSTRUCTIVE SLEEP APNEA): ICD-10-CM

## 2018-09-19 PROCEDURE — 95811 POLYSOM 6/>YRS CPAP 4/> PARM: CPT

## 2018-09-19 PROCEDURE — 95811 PR POLYSOMNOGRAPHY W/CPAP: ICD-10-PCS | Mod: 26,,, | Performed by: PSYCHIATRY & NEUROLOGY

## 2018-09-19 PROCEDURE — 95811 POLYSOM 6/>YRS CPAP 4/> PARM: CPT | Mod: 26,,, | Performed by: PSYCHIATRY & NEUROLOGY

## 2018-09-21 ENCOUNTER — OFFICE VISIT (OUTPATIENT)
Dept: UROLOGY | Facility: CLINIC | Age: 68
End: 2018-09-21
Payer: MEDICARE

## 2018-09-21 VITALS
DIASTOLIC BLOOD PRESSURE: 70 MMHG | BODY MASS INDEX: 42.46 KG/M2 | WEIGHT: 239.63 LBS | SYSTOLIC BLOOD PRESSURE: 142 MMHG | HEART RATE: 78 BPM | HEIGHT: 63 IN

## 2018-09-21 DIAGNOSIS — N30.90 BLADDER INFECTION: Primary | ICD-10-CM

## 2018-09-21 DIAGNOSIS — N30.10 INTERSTITIAL CYSTITIS: ICD-10-CM

## 2018-09-21 PROCEDURE — 51701 INSERT BLADDER CATHETER: CPT | Mod: PBBFAC | Performed by: UROLOGY

## 2018-09-21 PROCEDURE — 81002 URINALYSIS NONAUTO W/O SCOPE: CPT | Mod: PBBFAC | Performed by: UROLOGY

## 2018-09-21 PROCEDURE — 51701 INSERT BLADDER CATHETER: CPT | Mod: S$PBB,,, | Performed by: UROLOGY

## 2018-09-21 PROCEDURE — 87186 SC STD MICRODIL/AGAR DIL: CPT

## 2018-09-21 PROCEDURE — 3078F DIAST BP <80 MM HG: CPT | Mod: CPTII,,, | Performed by: UROLOGY

## 2018-09-21 PROCEDURE — 3077F SYST BP >= 140 MM HG: CPT | Mod: CPTII,,, | Performed by: UROLOGY

## 2018-09-21 PROCEDURE — 99213 OFFICE O/P EST LOW 20 MIN: CPT | Mod: PBBFAC,25 | Performed by: UROLOGY

## 2018-09-21 PROCEDURE — 87077 CULTURE AEROBIC IDENTIFY: CPT

## 2018-09-21 PROCEDURE — 99999 PR PBB SHADOW E&M-EST. PATIENT-LVL III: CPT | Mod: PBBFAC,,, | Performed by: UROLOGY

## 2018-09-21 PROCEDURE — 1101F PT FALLS ASSESS-DOCD LE1/YR: CPT | Mod: CPTII,,, | Performed by: UROLOGY

## 2018-09-21 PROCEDURE — 87086 URINE CULTURE/COLONY COUNT: CPT

## 2018-09-21 PROCEDURE — 99213 OFFICE O/P EST LOW 20 MIN: CPT | Mod: S$PBB,25,, | Performed by: UROLOGY

## 2018-09-21 PROCEDURE — 87088 URINE BACTERIA CULTURE: CPT

## 2018-09-21 RX ORDER — DOXYCYCLINE 100 MG/1
100 CAPSULE ORAL 2 TIMES DAILY
Qty: 14 CAPSULE | Refills: 0 | Status: SHIPPED | OUTPATIENT
Start: 2018-09-21 | End: 2018-09-28

## 2018-09-21 RX ORDER — DOXYCYCLINE 100 MG/1
100 CAPSULE ORAL 2 TIMES DAILY
Qty: 14 CAPSULE | Refills: 0 | Status: SHIPPED | OUTPATIENT
Start: 2018-09-21 | End: 2018-09-21

## 2018-09-21 NOTE — PROGRESS NOTES
CHIEF COMPLAINT:    Mrs. Jose is a 68 y.o. female presenting for recurrent UTI.    PRESENTING ILLNESS:    Vivienne Jose is a 68 y.o. female who returns for possible UTI. She states that she gets a back ache, funny vaginal sensation that turns into pruritis, a pulling sensation in the pelvis and her hemorrhoids can act up and this is how she knows she has a UTI.  She confirms that she is using a topical estrogen cream, bowels are normal.  She denies any constipation problems.  She has been on Methenamine and vitamin C for the past 6 months and has gotten breakthrough UTI's.  She confirms that she has been on a probiotic and the last two cultures were positive for a pan sensitive E coli.  Previous to that, she had some resistance.      REVIEW OF SYSTEMS:    Review of Systems   Constitutional: Negative.    HENT: Negative.    Eyes: Negative.    Respiratory: Negative.    Cardiovascular: Negative.    Gastrointestinal: Negative.    Genitourinary: Positive for dysuria and urgency.   Musculoskeletal: Positive for back pain and joint pain.   Skin: Negative.    Neurological: Negative.    Endo/Heme/Allergies: Negative.    Psychiatric/Behavioral: Negative.      PATIENT HISTORY:    Past Medical History:   Diagnosis Date    Allergy     Angio-edema     Arthritis     Cataract     Cerebrovascular malformation     Colon polyps     Coronary artery disease     Diabetes mellitus, type 2     Diabetic peripheral neuropathy     GERD (gastroesophageal reflux disease)     Herpes infection     Hyperlipidemia     Hypertension     Hypothyroidism     Kidney stones     Nausea & vomiting 11/23/2015    Obesity, morbid     Ovarian cyst     Pyelonephritis, chronic     Seizure disorder, focal motor     Sleep apnea     Type II or unspecified type diabetes mellitus with neurological manifestations, uncontrolled(250.62)     Urinary tract infection     Urticaria     Vaginal infection        Past Surgical History:   Procedure  Laterality Date    CARPAL TUNNEL RELEASE Right 2014    COLONOSCOPY      COLONOSCOPY N/A 9/13/2017    Procedure: COLONOSCOPY Golytely;  Surgeon: Gisela Wall MD;  Location: [facility] END[facility];  Service: Endoscopy;  Laterality: N/A;    COLONOSCOPY N/A 8/29/2014    Performed [facility]ela [facility] at [facility]    COLONOSCOPY Golytely N/A 9/13/2017    [facility] at [facility]    CYST REMOVAL      skin; multiples    CYSTOSCOPY WITH STENT PLACEMENT Left 12/28/2016    [facility] at [facility] OR    ENDOSCOPIC-RELEASE-CARPAL TUNNEL right Right 7/8/2014    [facility] at [facility]    EXTRACORPOREAL SHOCK WAVE LITHOTRIPSY  2002    EXTRACTION-STONE-URETEROSCOPY Left 12/28/2016    [facility] at [facility] OR    HYSTERECTOMY  1984    KIDNEY STONE SURGERY      LITHOTRIPSY-LASER Left 12/28/2016    [facility] OR    RELEASE-FINGER-TRIGGER right long finger Right 7/8/2014    [facility] at [facility]    THYROIDECTOMY  1977         TONSILLECTOMY, ADENOIDECTOMY      TRIGGER FINGER RELEASE      TUBAL LIGATION         Family History   Problem Relation Age of Onset    Cancer Father     Arthritis Father     Gout Father     Allergies Son     Allergic rhinitis Son     Skin cancer Mother     Macular degeneration Mother     Dementia Mother     Hypertension Mother     No Known Problems Daughter     Diabetes Daughter     No Known Problems Daughter     Glaucoma Maternal Aunt         Great Maternal Aunt    Leukemia Maternal Uncle      Socioeconomic History    Marital status:    Occupational History    Occupation: retired     Employer: [employer]   Tobacco Use    Smoking status: Never Smoker    Smokeless tobacco: Never Used   Substance and Sexual Activity    Alcohol use: No     Alcohol/week: 0.0 oz    Drug use: No    Sexual activity: Yes      Partners: Male       Allergies:  Sulfa (sulfonamide antibiotics); Ciprofloxacin; Januvia [sitagliptin]; Lisinopril; Lotensin [benazepril]; and Nexium [esomeprazole magnesium]    Medications:  Outpatient Encounter Medications as of 9/21/2018   Medication Sig Dispense Refill    amLODIPine (NORVASC) 5 MG tablet Take 1 tablet (5 mg total) by mouth once daily. 90 tablet 3    ascorbic acid, vitamin C, (VITAMIN C) 500 mg Chew       aspirin (ECOTRIN) 81 MG EC tablet Take 81 mg by mouth once daily.      azelastine (ASTELIN) 137 mcg (0.1 %) nasal spray 2 sprays (274 mcg total) by Nasal route 2 (two) times daily. 90 mL 4    biotin 10,000 mcg Cap Take by mouth.      blood sugar diagnostic (TRUE METRIX GLUCOSE TEST STRIP) Strp TEST TWO TIMES DAILY 200 strip 6    blood-glucose meter Misc [employer] True Metrix Air meter 1 each 0    calcium carbonate-vitamin D3 600 mg(1,500mg) -100 unit Cap Take 1 tablet by mouth once daily.      chlorthalidone (HYGROTEN) 25 MG Tab Take 1 tablet (25 mg total) by mouth once daily. 90 tablet 1    diclofenac sodium (VOLTAREN) 1 % Gel Apply 2 g topically 4 (four) times daily. 3 Tube 3    econazole nitrate 1 % cream Mix with hydrocortisone cream 1% 30 grams use bid prn 60 g 3    fluticasone (FLONASE) 50 mcg/actuation nasal spray 2 sprays (100 mcg total) by Each Nare route once daily. 48 g 4    gabapentin (NEURONTIN) 300 MG capsule Take 2 capsules (600 mg total) by mouth every evening. 180 capsule 1    glucagon (human recombinant) inj 1mg/mL kit Inject 1 mL (1 mg total) into the muscle as needed. 1 kit 6    insulin detemir U-100 (LEVEMIR FLEXTOUCH U-100 INSULN) 100 unit/mL (3 mL) SubQ InPn pen Inject 16 Units into the skin every evening. 1 Box 6    ketotifen (ZADITOR) 0.025 % (0.035 %) ophthalmic solution Place 1-2 drops into both eyes once daily.      L.acid-B.bifidum-B.animal-FOS (PROBIOTIC COMPLEX) 25 billion cell -100 mg Cap Take 1 capsule by mouth once daily.      lancets  "(TRUEPLUS LANCETS) 28 gauge Misc Inject 1 lancet into the skin 2 (two) times daily before meals. 200 each 6    levothyroxine (SYNTHROID) 75 MCG tablet TAKE 1 TABLET EVERY DAY 90 tablet 3    liraglutide 0.6 mg/0.1 mL, 18 mg/3 mL, subq PNIJ (VICTOZA 3-TAWANA) 0.6 mg/0.1 mL (18 mg/3 mL) PnIj INJECT 1.8 MG INTO THE SKIN ONCE DAILY. 27 mL 11    loratadine (CLARITIN) 10 mg tablet Take 10 mg by mouth once daily.      lovastatin (MEVACOR) 40 MG tablet TAKE 1 TABLET NIGHTLY (SUBSTITUTED FOR MEVACOR) 90 tablet 1    magnesium oxide-Mg AA chelate (MAGNESIUM, AMINO ACID CHELATE,) 133 mg Tab Take by mouth as directed.       metFORMIN (GLUCOPHAGE) 1000 MG tablet Take 1 tablet (1,000 mg total) by mouth 2 (two) times daily with meals. 180 tablet 3    methenamine (HIPREX) 1 gram Tab       methylPREDNISolone (MEDROL DOSEPACK) 4 mg tablet use as directed 1 Package 1    omeprazole (PRILOSEC) 20 MG capsule Take 1 capsule (20 mg total) by mouth daily as needed. 90 capsule 1    pen needle, diabetic (BD ULTRA-FINE EDUARDO PEN NEEDLES) 32 gauge x 5/32" Ndle USE WITH VICTOZA AND LEVEMIR (2 INJECTIONS EVERY DAY) 100 each 3    potassium chloride SA (K-DUR,KLOR-CON) 10 MEQ tablet Take 10 meq PO QD except on Mondays, Wednesdays and Fridays, on which she should take 2 tabs (20 meq) QD 40 tablet 1    PREMARIN vaginal cream       repaglinide (PRANDIN) 2 MG tablet Take 1 tablet (2 mg total) by mouth 3 (three) times daily before meals. 270 tablet 3    silver sulfADIAZINE 1% (SILVADENE) 1 % cream Apply topically 2 (two) times daily. 25 g 1    valACYclovir (VALTREX) 500 MG tablet Take 1 tablet (500 mg total) by mouth once daily. 90 tablet 1    valsartan (DIOVAN) 160 MG tablet Take 1 tablet (160 mg total) by mouth 2 (two) times daily. 180 tablet 1     Facility-Administered Encounter Medications as of 9/21/2018   Medication Dose Route Frequency Provider Last Rate Last Dose    Allergy Mix   Subcutaneous 1 time in Clinic/HOD Ailin Mills, " MD             PHYSICAL EXAMINATION:    The patient generally appears in good health, is appropriately interactive, and is in no apparent distress.    Skin: No lesions.    Mental: Cooperative with normal affect.    Neuro: Grossly intact.    HEENT: Normal. No evidence of lymphadenopathy.    Chest:  normal inspiratory effort.    Abdomen:  Soft, non-tender. No masses or organomegaly. Bladder is not palpable. No evidence of flank discomfort. No evidence of inguinal hernia.    Extremities: No clubbing, cyanosis, or edema    Normal external female genitalia  Urethral meatus is normal  Urethra and bladder are nontender to bimanual exam  Well supported anteriorly and posteriorly   Uterus and cervix are normal  No adnexal masses  PVR by catheterization was 90 ml  Small inclusion cyst of the left labia majora on the lower portion of the lip.   LABS:    Lab Results   Component Value Date    BUN 15 08/31/2018    CREATININE 0.8 08/31/2018     UA 1.015, pH 6, ++ leuk, + nitrite, 50 blood, otherwise, negative      IMPRESSION:    Encounter Diagnoses   Name Primary?    Bladder infection Yes    Interstitial cystitis        PLAN:    1.  The catheterized specimen was sent for culture  2.  Discontinue methenamine/Vitamin C  3.  Start Ellura  4.  Doxycycline sent empirically as her last two infections have been a pan sensitive E coli.   5.  Follow up in 6 months.

## 2018-09-24 ENCOUNTER — TELEPHONE (OUTPATIENT)
Dept: UROLOGY | Facility: CLINIC | Age: 68
End: 2018-09-24

## 2018-09-24 LAB — BACTERIA UR CULT: NORMAL

## 2018-09-24 NOTE — TELEPHONE ENCOUNTER
----- Message from Patricia Cee MD sent at 9/24/2018 11:43 AM CDT -----  Patient was prescribed doxycycline at her visit.  Please let her know she had a positive culture and to complete the doxycycline as it was sensitive.

## 2018-09-25 ENCOUNTER — TELEPHONE (OUTPATIENT)
Dept: SLEEP MEDICINE | Facility: CLINIC | Age: 68
End: 2018-09-25

## 2018-09-25 DIAGNOSIS — G47.33 OSA (OBSTRUCTIVE SLEEP APNEA): Primary | ICD-10-CM

## 2018-09-26 NOTE — TELEPHONE ENCOUNTER
Corrine,    Please let Vivienne Jose  Know - I will order autoCPAP through [facility]       Please schedule 7 week follow up - MD/NP    TY1

## 2018-09-28 ENCOUNTER — HOSPITAL ENCOUNTER (OUTPATIENT)
Dept: RADIOLOGY | Facility: HOSPITAL | Age: 68
Discharge: HOME | End: 2018-09-28
Attending: ORTHOPAEDIC SURGERY
Payer: MEDICARE

## 2018-09-28 ENCOUNTER — CLINICAL SUPPORT (OUTPATIENT)
Dept: INTERNAL MEDICINE | Facility: CLINIC | Age: 68
End: 2018-09-28
Payer: MEDICARE

## 2018-09-28 ENCOUNTER — LAB VISIT (OUTPATIENT)
Dept: LAB | Facility: HOSPITAL | Age: 68
End: 2018-09-28
Attending: INTERNAL MEDICINE
Payer: MEDICARE

## 2018-09-28 DIAGNOSIS — M54.16 LUMBAR RADICULOPATHY, CHRONIC: ICD-10-CM

## 2018-09-28 DIAGNOSIS — E87.6 HYPOKALEMIA: ICD-10-CM

## 2018-09-28 LAB
ANION GAP SERPL CALC-SCNC: 9 MMOL/L
BUN SERPL-MCNC: 26 MG/DL
CALCIUM SERPL-MCNC: 9.7 MG/DL
CHLORIDE SERPL-SCNC: 100 MMOL/L
CO2 SERPL-SCNC: 32 MMOL/L
CREAT SERPL-MCNC: 0.8 MG/DL
EST. GFR  (AFRICAN AMERICAN): >60 ML/MIN/1.73 M^2
EST. GFR  (NON AFRICAN AMERICAN): >60 ML/MIN/1.73 M^2
GLUCOSE SERPL-MCNC: 104 MG/DL
POTASSIUM SERPL-SCNC: 3.7 MMOL/L
SODIUM SERPL-SCNC: 141 MMOL/L

## 2018-09-28 PROCEDURE — 99499 UNLISTED E&M SERVICE: CPT | Mod: S$PBB,,, | Performed by: ALLERGY & IMMUNOLOGY

## 2018-09-28 PROCEDURE — 36415 COLL VENOUS BLD VENIPUNCTURE: CPT | Mod: PO

## 2018-09-28 PROCEDURE — 80048 BASIC METABOLIC PNL TOTAL CA: CPT

## 2018-09-28 PROCEDURE — 72148 MRI LUMBAR SPINE W/O DYE: CPT | Mod: 26,,, | Performed by: RADIOLOGY

## 2018-09-28 PROCEDURE — 95115 IMMUNOTHERAPY ONE INJECTION: CPT | Mod: PBBFAC,PO

## 2018-09-28 PROCEDURE — 72148 MRI LUMBAR SPINE W/O DYE: CPT | Mod: TC

## 2018-09-28 NOTE — PROGRESS NOTES
Patient here for allergy injection.  No new meds, no problems with last injection.    0.5 mL of Red 1:1 given SQ in upper Left arm.  Tolerated well.    Site checked after 30 minutes,0 reaction of erythema and wheal noted.  No complaints voiced.

## 2018-10-01 RX ORDER — CONJUGATED ESTROGENS 0.62 MG/G
0.5 CREAM VAGINAL
Qty: 30 G | Refills: 3 | Status: SHIPPED | OUTPATIENT
Start: 2018-10-01 | End: 2020-03-23 | Stop reason: SDUPTHER

## 2018-10-02 ENCOUNTER — CLINICAL SUPPORT (OUTPATIENT)
Dept: REHABILITATION | Facility: OTHER | Age: 68
End: 2018-10-02
Attending: PHYSICAL MEDICINE & REHABILITATION
Payer: MEDICARE

## 2018-10-02 VITALS
BODY MASS INDEX: 44.1 KG/M2 | HEIGHT: 62 IN | SYSTOLIC BLOOD PRESSURE: 122 MMHG | HEART RATE: 72 BPM | DIASTOLIC BLOOD PRESSURE: 63 MMHG | WEIGHT: 239.63 LBS

## 2018-10-02 DIAGNOSIS — G89.29 CHRONIC LEFT-SIDED LOW BACK PAIN WITH LEFT-SIDED SCIATICA: ICD-10-CM

## 2018-10-02 DIAGNOSIS — M47.26 OSTEOARTHRITIS OF SPINE WITH RADICULOPATHY, LUMBAR REGION: Primary | ICD-10-CM

## 2018-10-02 DIAGNOSIS — M54.42 CHRONIC LEFT-SIDED LOW BACK PAIN WITH LEFT-SIDED SCIATICA: ICD-10-CM

## 2018-10-02 PROCEDURE — 99204 OFFICE O/P NEW MOD 45 MIN: CPT | Mod: ,,, | Performed by: PHYSICAL MEDICINE & REHABILITATION

## 2018-10-02 NOTE — PROGRESS NOTES
Subjective:      Patient ID: Vivienne Jose is a 68 y.o. female.    Chief Complaint: No chief complaint on file.    Referred by: Rosa Ham PA-C     Ms Jose is a 69 yo female here  for evaluation for the healthy back lumbar program.  she has had low back pain for greater than 5 years.  There was no event that started the pain.  The pain is left low back dominant, it goes to left glute, left hip and the lateral leg to the toes.  She has tingling and numbness in the left toes.  She has left low back burning, and left leg weakness.  The pain is intermittent 0-3/10.  It is worse with walking, standing, lying on her back, and carrying objects.  She feels like with right knee pain she puts more pressure on the left side.  She even sits to the left.  She is better with resting, sitting, lying on left side.  She did PT in 2015 with improvement for 2 years and then stopped doing exercises.  She has not done surgery, injections, or chiropractor.  She sits most of the day.  Her goals are walking, standing, carrying objects. Pattern 2    MRI lumbar 9/28/2018  There is a subtle anterolisthesis of L4 relative to L5.  The vertebral body heights are well maintained.  The disc spaces are relatively well maintained.  No fracture, no osseous lesions.  No abnormal bone marrow replacement process.    T12-L1: Unremarkable    L1-L2: Minimal disc bulge, no central canal stenosis or foraminal narrowing.    L2-L3: Subtle right paracentral disc protrusion, no central canal stenosis or foraminal narrowing, the facet joints appear normal.    L3-L4: Mild ligamentum flavum hypertrophy, no central canal stenosis or foraminal narrowing.    L4-5: Mild disc bulge, mild facet joint osseous hypertrophy.  Mild central canal stenosis.  Mild bilateral foraminal narrowing.    L5-S1: No central canal stenosis or foraminal narrowing.  There is mild facet joint degenerative change.    There are bilateral kidney cyst.    Impression      Mild  spondylosis of the lumbar spine unchanged from the prior study.  No severe central canal stenosis or foraminal narrowing.      Past Medical History:  No date: Allergy  No date: Angio-edema  No date: Arthritis  No date: Cataract  No date: Cerebrovascular malformation  No date: Colon polyps  No date: Coronary artery disease  No date: Diabetes mellitus, type 2  No date: Diabetic peripheral neuropathy  No date: GERD (gastroesophageal reflux disease)  No date: Herpes infection  No date: Hyperlipidemia  No date: Hypertension  No date: Hypothyroidism  No date: Kidney stones  11/23/2015: Nausea & vomiting  No date: Obesity, morbid  No date: Ovarian cyst  No date: Pyelonephritis, chronic  No date: Seizure disorder, focal motor  No date: Sleep apnea  No date: Type II or unspecified type diabetes mellitus with   neurological manifestations, uncontrolled(250.62)  No date: Urinary tract infection  No date: Urticaria  No date: Vaginal infection    Past Surgical History:  2014: CARPAL TUNNEL RELEASE; Right  No date: COLONOSCOPY  9/13/2017: COLONOSCOPY; N/A      Comment:  Procedure: COLONOSCOPY Golytely;  Surgeon: Gisela Wall MD;  Location: [facility];  Service: Endoscopy;               Laterality: N/A;  8/29/2014: COLONOSCOPY; N/A      Comment:  Performed by Gisela Wall MD at [facility] ENDO  9/13/2017: COLONOSCOPY Golytely; N/A      Comment:  Performed by Gisela Wall MD at [facility] ENDO  No date: CYST REMOVAL      Comment:  skin; multiples  12/28/2016: CYSTOSCOPY WITH STENT PLACEMENT; Left      Comment:  Performed by Greg Lyons MD at [facility] OR  7/8/2014: ENDOSCOPIC-RELEASE-CARPAL TUNNEL right; Right      Comment:  Performed by Riley Rivera MD at [facility] OR 1ST FLR  2002: EXTRACORPOREAL SHOCK WAVE LITHOTRIPSY  12/28/2016: EXTRACTION-STONE-URETEROSCOPY; Left      Comment:  Performed by Greg Lyons MD at [facility] OR  1984: HYSTERECTOMY  No date: KIDNEY STONE SURGERY  12/28/2016:  LITHOTRIPSY-LASER; Left      Comment:  Performed by Greg Lyons MD at [facility] OR  7/8/2014: RELEASE-FINGER-TRIGGER right long finger; Right      Comment:  Performed by Riley Rivera MD at [facility] OR [address] [place] [address]  1977: THYROIDECTOMY      Comment:     No date: TONSILLECTOMY, ADENOIDECTOMY  No date: TRIGGER FINGER RELEASE  No date: TUBAL LIGATION    Review of patient's family history indicates:  Problem: Cancer      Relation: Father          Age of Onset: (Not Specified)  Problem: Arthritis      Relation: Father          Age of Onset: (Not Specified)  Problem: Gout      Relation: Father          Age of Onset: (Not Specified)  Problem: Allergies      Relation: Son          Age of Onset: (Not Specified)  Problem: Allergic rhinitis      Relation: Son          Age of Onset: (Not Specified)  Problem: Skin cancer      Relation: Mother          Age of Onset: (Not Specified)  Problem: Macular degeneration      Relation: Mother          Age of Onset: (Not Specified)  Problem: Dementia      Relation: Mother          Age of Onset: (Not Specified)  Problem: Hypertension      Relation: Mother          Age of Onset: (Not Specified)  Problem: No Known Problems      Relation: Daughter          Age of Onset: (Not Specified)  Problem: Diabetes      Relation: Daughter          Age of Onset: (Not Specified)  Problem: No Known Problems      Relation: Daughter          Age of Onset: (Not Specified)  Problem: Glaucoma      Relation: Maternal Aunt          Age of Onset: (Not Specified)          Comment: Great Maternal Aunt  Problem: Leukemia      Relation: Maternal Uncle          Age of Onset: (Not Specified)  Problem: Amblyopia      Relation: Neg Hx          Age of Onset: (Not Specified)  Problem: Cataracts      Relation: Neg Hx          Age of Onset: (Not Specified)  Problem: Retinal detachment      Relation: Neg Hx          Age of Onset: (Not Specified)  Problem: Strabismus      Relation: Neg Hx          Age of Onset: (Not  Specified)  Problem: Stroke      Relation: Neg Hx          Age of Onset: (Not Specified)  Problem: Thyroid disease      Relation: Neg Hx          Age of Onset: (Not Specified)  Problem: Kidney disease      Relation: Neg Hx          Age of Onset: (Not Specified)  Problem: Angioedema      Relation: Neg Hx          Age of Onset: (Not Specified)  Problem: Asthma      Relation: Neg Hx          Age of Onset: (Not Specified)  Problem: Atopy      Relation: Neg Hx          Age of Onset: (Not Specified)  Problem: Eczema      Relation: Neg Hx          Age of Onset: (Not Specified)  Problem: Immunodeficiency      Relation: Neg Hx          Age of Onset: (Not Specified)  Problem: Rhinitis      Relation: Neg Hx          Age of Onset: (Not Specified)  Problem: Urticaria      Relation: Neg Hx          Age of Onset: (Not Specified)      Social History    Socioeconomic History      Marital status:       Spouse name: Not on file      Number of children: Not on file      Years of education: Not on file      Highest education level: Not on file    Social Needs      Financial resource strain: Not on file      Food insecurity - worry: Not on file      Food insecurity - inability: Not on file      Transportation needs - medical: Not on file      Transportation needs - non-medical: Not on file    Occupational History      Occupation: retired        Employer: [employer]    Tobacco Use      Smoking status: Never Smoker      Smokeless tobacco: Never Used    Substance and Sexual Activity      Alcohol use: No        Alcohol/week: 0.0 oz      Drug use: No      Sexual activity: Yes        Partners: Male    Other Topics      Concerns:        Are you pregnant or think you may be?: Not Asked        Breast-feeding: Not Asked    Social History Narrative      Not on file      Current Outpatient Medications:  amLODIPine (NORVASC) 5 MG tablet, Take 1 tablet (5 mg total) by mouth once daily., Disp: 90 tablet, Rfl: 3  ascorbic acid, vitamin  C, (VITAMIN C) 500 mg Chew, , Disp: , Rfl:   aspirin (ECOTRIN) 81 MG EC tablet, Take 81 mg by mouth once daily., Disp: , Rfl:   azelastine (ASTELIN) 137 mcg (0.1 %) nasal spray, 2 sprays (274 mcg total) by Nasal route 2 (two) times daily., Disp: 90 mL, Rfl: 4  biotin 10,000 mcg Cap, Take by mouth., Disp: , Rfl:   blood sugar diagnostic (TRUE METRIX GLUCOSE TEST STRIP) Strp, TEST TWO TIMES DAILY, Disp: 200 strip, Rfl: 6  blood-glucose meter Misc, [employer] True Metrix Air meter, Disp: 1 each, Rfl: 0   calcium carbonate-vitamin D3 600 mg(1,500mg) -100 unit Cap, Take 1 tablet by mouth once daily., Disp: , Rfl:   chlorthalidone (HYGROTEN) 25 MG Tab, Take 1 tablet (25 mg total) by mouth once daily., Disp: 90 tablet, Rfl: 1  diclofenac sodium (VOLTAREN) 1 % Gel, Apply 2 g topically 4 (four) times daily., Disp: 3 Tube, Rfl: 3  econazole nitrate 1 % cream, Mix with hydrocortisone cream 1% 30 grams use bid prn, Disp: 60 g, Rfl: 3  fluticasone (FLONASE) 50 mcg/actuation nasal spray, 2 sprays (100 mcg total) by Each Nare route once daily., Disp: 48 g, Rfl: 4  gabapentin (NEURONTIN) 300 MG capsule, Take 2 capsules (600 mg total) by mouth every evening., Disp: 180 capsule, Rfl: 1  glucagon (human recombinant) inj 1mg/mL kit, Inject 1 mL (1 mg total) into the muscle as needed., Disp: 1 kit, Rfl: 6  insulin detemir U-100 (LEVEMIR FLEXTOUCH U-100 INSULN) 100 unit/mL (3 mL) SubQ InPn pen, Inject 16 Units into the skin every evening., Disp: 1 Box, Rfl: 6  ketotifen (ZADITOR) 0.025 % (0.035 %) ophthalmic solution, Place 1-2 drops into both eyes once daily., Disp: , Rfl:   L.acid-B.bifidum-B.animal-FOS (PROBIOTIC COMPLEX) 25 billion cell -100 mg Cap, Take 1 capsule by mouth once daily., Disp: , Rfl:   lancets (TRUEPLUS LANCETS) 28 gauge Misc, Inject 1 lancet into the skin 2 (two) times daily before meals., Disp: 200 each, Rfl: 6  levothyroxine (SYNTHROID) 75 MCG tablet, TAKE 1 TABLET EVERY DAY, Disp: 90 tablet, Rfl: 3  liraglutide 0.6  "mg/0.1 mL, 18 mg/3 mL, subq PNIJ (VICTOZA 3-TAWANA) 0.6 mg/0.1 mL (18 mg/3 mL) PnIj, INJECT 1.8 MG INTO THE SKIN ONCE DAILY., Disp: 27 mL, Rfl: 11  loratadine (CLARITIN) 10 mg tablet, Take 10 mg by mouth once daily., Disp: , Rfl:   lovastatin (MEVACOR) 40 MG tablet, TAKE 1 TABLET NIGHTLY (SUBSTITUTED FOR MEVACOR), Disp: 90 tablet, Rfl: 1  magnesium oxide-Mg AA chelate (MAGNESIUM, AMINO ACID CHELATE,) 133 mg Tab, Take by mouth as directed. , Disp: , Rfl:   metFORMIN (GLUCOPHAGE) 1000 MG tablet, Take 1 tablet (1,000 mg total) by mouth 2 (two) times daily with meals., Disp: 180 tablet, Rfl: 3  methylPREDNISolone (MEDROL DOSEPACK) 4 mg tablet, use as directed, Disp: 1 Package, Rfl: 1  omeprazole (PRILOSEC) 20 MG capsule, Take 1 capsule (20 mg total) by mouth daily as needed., Disp: 90 capsule, Rfl: 1  pen needle, diabetic (BD ULTRA-FINE EDUARDO PEN NEEDLES) 32 gauge x 5/32" Ndle, USE WITH VICTOZA AND LEVEMIR (2 INJECTIONS EVERY DAY), Disp: 100 each, Rfl: 3  potassium chloride SA (K-DUR,KLOR-CON) 10 MEQ tablet, Take 10 meq PO QD except on Mondays, Wednesdays and Fridays, on which she should take 2 tabs (20 meq) QD, Disp: 40 tablet, Rfl: 1  PREMARIN vaginal cream, Place 0.5 g vaginally 3 (three) times a week., Disp: 30 g, Rfl: 3  repaglinide (PRANDIN) 2 MG tablet, Take 1 tablet (2 mg total) by mouth 3 (three) times daily before meals., Disp: 270 tablet, Rfl: 3  silver sulfADIAZINE 1% (SILVADENE) 1 % cream, Apply topically 2 (two) times daily., Disp: 25 g, Rfl: 1  valACYclovir (VALTREX) 500 MG tablet, Take 1 tablet (500 mg total) by mouth once daily., Disp: 90 tablet, Rfl: 1  valsartan (DIOVAN) 160 MG tablet, Take 1 tablet (160 mg total) by mouth 2 (two) times daily., Disp: 180 tablet, Rfl: 1    Current Facility-Administered Medications:  Allergy Mix, , Subcutaneous, 1 time in Clinic/HOD, Ailin Mills MD        Review of patient's allergies indicates:   -- Sulfa (sulfonamide antibiotics) -- Nausea Only   -- Ciprofloxacin "    -- Januvia (sitagliptin)     --  abd pain   -- Lisinopril    -- Lotensin (benazepril)    -- Nexium (esomeprazole magnesium) -- Other (See Comments)    --  Gas                Review of Systems   Constitution: Negative for weight gain and weight loss.   Cardiovascular: Negative for chest pain.   Respiratory: Positive for shortness of breath.    Musculoskeletal: Positive for back pain (left leg) and joint pain (right knee pain). Negative for joint swelling.   Gastrointestinal: Positive for nausea. Negative for abdominal pain, bowel incontinence and vomiting.   Genitourinary: Negative for bladder incontinence.   Neurological: Negative for numbness.           Objective:          General    Vitals reviewed.  Constitutional: She is oriented to person, place, and time. She appears well-developed and well-nourished.   HENT:   Head: Normocephalic and atraumatic.   Pulmonary/Chest: Effort normal.   Neurological: She is alert and oriented to person, place, and time.   Psychiatric: She has a normal mood and affect. Her behavior is normal. Judgment and thought content normal.     General Musculoskeletal Exam   Gait: antalgic (right knee pain)     Right Ankle/Foot Exam     Tests   Heel Walk: able to perform  Tiptoe Walk: able to perform    Left Ankle/Foot Exam     Tests   Heel Walk: able to perform  Tiptoe Walk: able to perform  Back (L-Spine & T-Spine) / Neck (C-Spine) Exam     Back (L-Spine & T-Spine) Range of Motion   Extension: 30 (with pain)   Flexion: 80   Lateral bend right: 20   Lateral bend left: 20   Rotation right: 40   Rotation left: 40     Spinal Sensation   Right Side Sensation  C-Spine Level: normal   L-Spine Level: normal  S-Spine Level: normal  Left Side Sensation  C-Spine Level: normal  L-Spine Level: normal  S-Spine Level: normal    Back (L-Spine & T-Spine) Tests   Right Side Tests  Straight leg raise:      Sitting SLR: > 70 degrees      Left Side Tests  Straight leg raise:     Sitting SLR: > 70  degrees          Other She has no scoliosis .  Spinal Kyphosis:  Absent      Muscle Strength   Right Upper Extremity   Biceps: 5/5/5   Deltoid:  5/5  Triceps:  5/5  Wrist extension: 5/5/5   Finger Flexors:  5/5  Left Upper Extremity  Biceps: 5/5/5   Deltoid:  5/5  Triceps:  5/5  Wrist extension: 5/5/5   Finger Flexors:  5/5  Right Lower Extremity   Hip Flexion: 5/5   Quadriceps:  5/5   Anterior tibial:  5/5/5  EHL:  5/5  Left Lower Extremity   Hip Flexion: 5/5   Quadriceps:  5/5   Anterior tibial:  5/5/5   EHL:  5/5    Reflexes     Left Side  Biceps:  2+  Triceps:  2+  Brachioradialis:  2+  Quadriceps:  2+  Achilles:  2+  Left Weiss's Sign:  Absent  Babinski Sign:  absent    Right Side   Biceps:  2+  Triceps:  2+  Brachioradialis:  2+  Quadriceps:  2+  Achilles:  2+  Right Weiss's Sign:  absent  Babinski Sign:  absent    Vascular Exam     Right Pulses        Carotid:                  2+    Left Pulses        Carotid:                  2+        Assessment:       Encounter Diagnoses   Name Primary?    Osteoarthritis of spine with radiculopathy, lumbar region Yes    Chronic left-sided low back pain with left-sided sciatica          Plan:       Diagnoses and all orders for this visit:    Osteoarthritis of spine with radiculopathy, lumbar region  -     Ambulatory consult to [facility]    Chronic left-sided low back pain with left-sided sciatica  -     Ambulatory consult to [facility]         The patient has had a history of low back pain with limitations in there activities of Daily living    Previous treatment has not provided relief.    The situation was discussed at length with the patient.  More than 50% of the total time of 45 minutes was spent in counseling.  We discussed different causes of back pain and different treatment options.  We discussed the importance of stretching and strengthening.  We discussed the importance of strengthening and exercise in general.  We discussed CV exercise  and weight training and strengthening.  We discussed posture.  We discussed the pros and cons of further diagnostic testing, alternative treatment and Medications    Based on the history, physical exam, and functional index, an active physical therapy program is recommended.  The goal is to restore the patients function and reduce pain.  A program of progressive resistance exercises, biomechanical, and mobility maneuvers, instructions in proper body mechanics, aerobic conditioning and HEP will be utilized. The program will continue as long as making improvements.    An assessment of patients progress will be made at each visit to document change in status.    The patient will be actively involved in there own treatment, and responsible for appointments and home program    The patient's lumbar isometric strength will be tested and they will be placed in a program of isolated strength training based on 50% of their total functional torque and advanced as clinically appropriate.      Directional preference of pain will further influence the patients active rehabilitation program    The patient was instructed there might be an initial increase in discomfort    They are enrolled with good prognosis  Pattern 2

## 2018-10-03 ENCOUNTER — PATIENT OUTREACH (OUTPATIENT)
Dept: OTHER | Facility: OTHER | Age: 68
End: 2018-10-03

## 2018-10-03 NOTE — PROGRESS NOTES
Last 5 Patient Entered Readings                                      Current 30 Day Average: 133/67     Recent Readings 9/30/2018 9/28/2018 9/27/2018 9/26/2018 9/25/2018    SBP (mmHg) 128 129 137 132 135    DBP (mmHg) 65 64 64 66 71    Pulse 67 70 73 63 79        Ms. Jose's BP average is approaching goal. She is working to keep readings at goal, but is aware that some readings have been higher. She will be out of town for a week starting tomorrow and will resume checking BP readings when she returns. No changes required to medication regimen.     Will continue to monitor regularly. Will follow up in 2-3 months, sooner if BP begins to trend upward or downward.    Patient has my contact information and knows to call with any concerns or clinical changes.     Current HTN regimen:  Hypertension Medications             amLODIPine (NORVASC) 5 MG tablet Take 1 tablet (5 mg total) by mouth once daily.    chlorthalidone (HYGROTEN) 25 MG Tab Take 1 tablet (25 mg total) by mouth once daily.    valsartan (DIOVAN) 160 MG tablet Take 1 tablet (160 mg total) by mouth 2 (two) times daily.

## 2018-10-03 NOTE — PROGRESS NOTES
Health  met with patient to complete initial outcomes for the Healthy Back lumbar program.  Questions were reviewed with patient and discussed with Dr. Antunez. The patient will meet with Dr. Antunez to determine program enrollment.     HealthyBack Questionnaire  10/1/2018   Please select the location of your worst pain:  Low back   Please select the location of any additional pain: (hold down the control key, and click to select multiple responses) Low back, Seat- Left, Leg- Left   Did any event trigger your pain?  No   How long has this pain been going on?  >5 yrs   Please indicate how the pain is changing.  No Change   What is the WORST level of pain that you have experienced in the last week?  3   What is the LEAST level of pain that you have experienced in the past week?  0   What can you NOT do not that you used to be able to do?  Stand or walk for periods of time   Are your limitations mainly due to your pain?  Yes   What are your additional complaints, if any? Burning, Numbness, Tingling, Weakness   Is there ever a time during the day when your pain stops, even for a brief moment, before returning? Yes   If yes, when your pain stops, does it disappear completely? Is it totally gone? No   Does bending forward make your typical pain worse? No   Morning: Same   Afternoon: Same   Evening:  Same   Nighttime: Same   Standing:  Worse   Lying on stomach: Same   Lying on back: Worse   Sitting:  Better   Walking: Worse   Climbing stairs: Same   In the last seven days, have you had any changes in your bowel and/or bladder habits? No   Have all of your attempts to treat your back/leg pain resulted in failure?  No   Do you believe your doctor(s) do NOT understand how much pain you have?  No   Do you believe that you have one or more conditions that have not been diagnosed by your doctor(s)?  No   Do you believe that you deserve more understanding from others including your family than you are getting?  No   Do you  "feel relatively calm about how your back/leg pain has impacted your life?  Yes   Are you OK with treatment taking a very long time (even years) before you feel relief from your back/leg pain?  Yes   Do you believe that your pain has caused you to be more forgetful?  No   Do you feel that you have not received enough treatment for your pain?  No   Do you believe that your doctor(s) do not have the right training to treat your back/leg pain effectively?  No   Do you believe you should not be allowed to work or attend school because of your back/leg pain?  No   When did this pain begin?  > 5 yrs   Did the pain begin after an injury or an accident? No   Is the pain work related?  No   Please rakan which of the following over-the-counter medicines you take. (hold down the control key, and click to select multiple responses) Other   If you chose "other," please specify:  D3, magnesium, biotin, cranberry, see file   Please rakan which of the following prescription medicines you take. (hold down the control key, and click to select multiple responses) Steroids, Anticonvulsants (Gabapentin), Other   Emergency department  No   Health care providers (hold down the control key, and click to select multiple responses) Family doctor, Pain management, Orthopedic, Physical therapist   Investigations done (hold down the control key, and click to select multiple responses) X-ray, MRI   Procedures (hold down the control key, and click to select multiple responses) Other   Emergency department  Yes   Health care providers (hold down the control key, and click to select multiple responses) Family doctor, Orthopedic, Physical therapist   Investigations done (hold down the control key, and click to select multiple responses) X-ray, MRI   Procedures (hold down the control key, and click to select multiple responses) Injections   Have you had any surgery on your back?  No   Please rakan what you are experiencing. (hold down the control key, " and click to select multiple responses) Shortness of breath, Palpitations, Arthritic joint pain, Muscle pain in arms or legs   First activity you would like to perform better:  Walking    Current ability score to perform first activity: 4   Second activity you would like to perform better: standing   Current ability score to perform second activity: 4   Third activity you would like to perform better: carrying objects   Current ability score to perform third activity: 4   Pain intensity:  The pain comes and goes and is very severe.   Personal care (washing, dressing, etc.):  Washing and dressing increase the pain, and I find it necessary to change my way of doing it.   Lifting: I can lift heavy weights, but it causes pain.   Walking: I cannot walk more than a quarter mile without increasing pain.   Sitting: I can sit in any chair as long as I like.   Standing: Pain prevents me from standing more than 10 minutes.   Sleeping: I get no pain in bed.   Social life: Pain has no significant effect on my social life apart from limiting my more eregetic interests, e.g., dancing.   Traveling: I get some pain while traveling, but none of my usual forms of travel make it any worse.   Changing degree of pain: My pain is neither getting better nor worse.   Do you need any help looking after yourself? I need no help at all.   When doing household tasks, e.g., preparing food, gardening, using the video recorder, radio, telephone, or washing the car: Occasionally I need some help with household tasks.   Thinking about how easily you can get around your home and community: I get around my home and community by myself without any difficulty.   Because of your health, your relationships, e.g., your friends, partner or parents, generally: Are very close and warm   Thinking about your relationships with other people: I have plenty of friends and am never lonely.   Thinking about your health and your relationship with your  family:  My  role in the family is unaffected by my health.   Thinking about your vision, including when using your glasses or contact lenses if needed: I see normally.   Thinking about your hearing, including using your hearing aid if needed: I hear normally.   When you communicate with others, e.g., talking, listening, writing, or signing: I have no trouble speaking to them or understanding what they are saying.   Thinking about how you sleep: I sleep in short bursts only. I am awake most of the night.   Thinking about how you generally feel: I do not feel anxious, worried or depressed.   How much pain or discomfort do you experience? I have moderate pain.   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 What are your hobbies? computer games   How do you spend your leisure time? What are your hobbies? computer games   What is your highest level of education? College   What is your work status? Employed   How did you hear about the Healthyback program?  Patient referral   When did this pain begin?  > 5 yrs   Do you need any help looking after yourself? I need no help at all.   When doing household tasks, e.g., preparing food, gardening, using the video recorder, radio, telephone, or washing the car: Occasionally I need some help with household tasks.   Thinking about how easily you can get around your home and community: I get around my home and community by myself without any difficulty.   Because of your health, your relationships, e.g., your friends, partner or parents, generally: Are very close and warm   Thinking about your relationships with other people: I have plenty of friends and am never lonely.   Thinking about your health and your relationship with your  family:  My role in the family is unaffected by my health.   Thinking about your vision, including when using your glasses or contact  lenses if needed: I see normally.   Thinking about your hearing, including using your hearing aid if needed: I hear normally.   When you communicate with others, e.g., talking, listening, writing, or signing: I have no trouble speaking to them or understanding what they are saying.   Thinking about how you sleep: I sleep in short bursts only. I am awake most of the night.   Thinking about how you generally feel: I do not feel anxious, worried or depressed.   How much pain or discomfort do you experience? I have moderate pain.   Little interest or pleasure in doing things Not at all   Feeling down, depressed or hopeless Not at all

## 2018-10-14 ENCOUNTER — OFFICE VISIT (OUTPATIENT)
Dept: URGENT CARE | Facility: CLINIC | Age: 68
End: 2018-10-14
Payer: MEDICARE

## 2018-10-14 VITALS
SYSTOLIC BLOOD PRESSURE: 134 MMHG | HEART RATE: 78 BPM | HEIGHT: 62 IN | WEIGHT: 239 LBS | OXYGEN SATURATION: 96 % | RESPIRATION RATE: 18 BRPM | DIASTOLIC BLOOD PRESSURE: 63 MMHG | BODY MASS INDEX: 43.98 KG/M2 | TEMPERATURE: 98 F

## 2018-10-14 DIAGNOSIS — R30.0 DYSURIA: ICD-10-CM

## 2018-10-14 DIAGNOSIS — N30.00 ACUTE CYSTITIS WITHOUT HEMATURIA: Primary | ICD-10-CM

## 2018-10-14 LAB
BILIRUB UR QL STRIP: NEGATIVE
GLUCOSE UR QL STRIP: NEGATIVE
KETONES UR QL STRIP: NEGATIVE
LEUKOCYTE ESTERASE UR QL STRIP: POSITIVE
PH, POC UA: 8 (ref 5–8)
POC BLOOD, URINE: NEGATIVE
POC NITRATES, URINE: NEGATIVE
PROT UR QL STRIP: NEGATIVE
SP GR UR STRIP: 1.01 (ref 1–1.03)
UROBILINOGEN UR STRIP-ACNC: NORMAL (ref 0.1–1.1)

## 2018-10-14 PROCEDURE — 3075F SYST BP GE 130 - 139MM HG: CPT | Mod: CPTII,S$GLB,, | Performed by: PHYSICIAN ASSISTANT

## 2018-10-14 PROCEDURE — 3078F DIAST BP <80 MM HG: CPT | Mod: CPTII,S$GLB,, | Performed by: PHYSICIAN ASSISTANT

## 2018-10-14 PROCEDURE — 99214 OFFICE O/P EST MOD 30 MIN: CPT | Mod: 25,S$GLB,, | Performed by: PHYSICIAN ASSISTANT

## 2018-10-14 PROCEDURE — 81003 URINALYSIS AUTO W/O SCOPE: CPT | Mod: QW,S$GLB,, | Performed by: PHYSICIAN ASSISTANT

## 2018-10-14 PROCEDURE — 1101F PT FALLS ASSESS-DOCD LE1/YR: CPT | Mod: CPTII,S$GLB,, | Performed by: PHYSICIAN ASSISTANT

## 2018-10-14 RX ORDER — NITROFURANTOIN 25; 75 MG/1; MG/1
100 CAPSULE ORAL 2 TIMES DAILY
Qty: 14 CAPSULE | Refills: 0 | Status: SHIPPED | OUTPATIENT
Start: 2018-10-14 | End: 2018-10-21

## 2018-10-14 RX ORDER — LORAZEPAM 0.5 MG/1
TABLET ORAL
Refills: 0 | COMMUNITY
Start: 2018-09-21 | End: 2019-02-08

## 2018-10-14 NOTE — PATIENT INSTRUCTIONS

## 2018-10-14 NOTE — PROGRESS NOTES
"Subjective:       Patient ID: Vivienne Jose is a 68 y.o. female.    Vitals:  height is 5' 2" (1.575 m) and weight is 108.4 kg (239 lb). Her temperature is 98.1 °F (36.7 °C). Her blood pressure is 134/63 and her pulse is 78. Her respiration is 18 and oxygen saturation is 96%.     Chief Complaint: Dysuria    Dysuria    This is a new problem. The current episode started gradual onset. The problem occurs every urination. The problem has been unchanged. The quality of the pain is described as aching. The pain is at a severity of 2/10. The pain is mild. There has been no fever. The fever has been present for less than 1 day. She is sexually active. There is no history of pyelonephritis. Associated symptoms include urgency. Pertinent negatives include no chills, hematuria, nausea or vomiting. She has tried nothing for the symptoms.     Review of Systems   Constitution: Negative for chills and fever.   Skin: Negative for itching.   Musculoskeletal: Positive for back pain.   Gastrointestinal: Positive for abdominal pain. Negative for nausea and vomiting.   Genitourinary: Positive for dysuria and urgency. Negative for genital sores, hematuria, missed menses and non-menstrual bleeding.       Objective:      Physical Exam   Constitutional: She is oriented to person, place, and time. She appears well-developed and well-nourished. She is cooperative.  Non-toxic appearance. She does not appear ill. No distress.   HENT:   Head: Normocephalic and atraumatic.   Right Ear: Hearing, tympanic membrane, external ear and ear canal normal.   Left Ear: Hearing, tympanic membrane, external ear and ear canal normal.   Nose: Nose normal. No mucosal edema, rhinorrhea or nasal deformity. No epistaxis. Right sinus exhibits no maxillary sinus tenderness and no frontal sinus tenderness. Left sinus exhibits no maxillary sinus tenderness and no frontal sinus tenderness.   Mouth/Throat: Uvula is midline, oropharynx is clear and moist and mucous " membranes are normal. No trismus in the jaw. Normal dentition. No uvula swelling. No posterior oropharyngeal erythema.   Eyes: Conjunctivae and lids are normal. Right eye exhibits no discharge. Left eye exhibits no discharge. No scleral icterus.   Sclera clear bilat   Neck: Trachea normal, normal range of motion, full passive range of motion without pain and phonation normal. Neck supple.   Cardiovascular: Normal rate, regular rhythm, normal heart sounds, intact distal pulses and normal pulses.   Pulmonary/Chest: Effort normal and breath sounds normal. No respiratory distress.   Abdominal: Soft. Normal appearance and bowel sounds are normal. She exhibits no distension, no abdominal bruit, no pulsatile midline mass and no mass. There is no tenderness.   Musculoskeletal: Normal range of motion. She exhibits no edema or deformity.   Neurological: She is alert and oriented to person, place, and time. She has normal strength. She exhibits normal muscle tone. Coordination normal.   Skin: Skin is warm, dry and intact. She is not diaphoretic. No pallor.   Psychiatric: She has a normal mood and affect. Her speech is normal and behavior is normal. Judgment and thought content normal. Cognition and memory are normal.   Nursing note and vitals reviewed.      Assessment:       1. Acute cystitis without hematuria    2. Dysuria        Plan:         Acute cystitis without hematuria  -     nitrofurantoin, macrocrystal-monohydrate, (MACROBID) 100 MG capsule; Take 1 capsule (100 mg total) by mouth 2 (two) times daily. for 7 days  Dispense: 14 capsule; Refill: 0    Dysuria  -     POCT Urinalysis, Dipstick, Automated, W/O Scope  -     Urine culture          Bladder Infection, Female (Adult)    Urine is normally doesn't have any bacteria in it. But bacteria can get into the urinary tract from the skin around the rectum. Or they can travel in the blood from elsewhere in the body. Once they are in your urinary tract, they can cause  "infection in the urethra (urethritis), the bladder (cystitis), or the kidneys (pyelonephritis).  The most common place for an infection is in the bladder. This is called a bladder infection. This is one of the most common infections in women. Most bladder infections are easily treated. They are not serious unless the infection spreads to the kidney.  The phrases "bladder infection," "UTI," and "cystitis" are often used to describe the same thing. But they are not always the same. Cystitis is an inflammation of the bladder. The most common cause of cystitis is an infection.  Symptoms  The infection causes inflammation in the urethra and bladder. This causes many of the symptoms. The most common symptoms of a bladder infection are:  · Pain or burning when urinating  · Having to urinate more often than usual  · Urgent need to urinate  · Only a small amount of urine comes out  · Blood in urine  · Abdominal discomfort. This is usually in the lower abdomen above the pubic bone.  · Cloudy urine  · Strong- or bad-smelling urine  · Unable to urinate (urinary retention)  · Unable to hold urine in (urinary incontinence)  · Fever  · Loss of appetite  · Confusion (in older adults)  Causes  Bladder infections are not contagious. You can't get one from someone else, from a toilet seat, or from sharing a bath.  The most common cause of bladder infections is bacteria from the bowels. The bacteria get onto the skin around the opening of the urethra. From there, they can get into the urine and travel up to the bladder, causing inflammation and infection. This usually happens because of:  · Wiping improperly after urinating. Always wipe from front to back.  · Bowel incontinence  · Pregnancy  · Procedures such as having a catheter inserted  · Older age  · Not emptying your bladder. This can allow bacteria a chance to grow in your urine.  · Dehydration  · Constipation  · Sex  · Use of a diaphragm for birth control   Treatment  Bladder " infections are diagnosed by a urine test. They are treated with antibiotics and usually clear up quickly without complications. Treatment helps prevent a more serious kidney infection.  Medicines  Medicines can help in the treatment of a bladder infection:  · Take antibiotics until they are used up, even if you feel better. It is important to finish them to make sure the infection has cleared.  · You can use acetaminophen or ibuprofen for pain, fever, or discomfort, unless another medicine was prescribed. If you have chronic liver or kidney disease, talk with your healthcare provider before using these medicines. Also talk with your provider if you've ever had a stomach ulcer or gastrointestinal bleeding, or are taking blood-thinner medicines.  · If you are given phenazopydridine to reduce burning with urination, it will cause your urine to become a bright orange color. This can stain clothing.  Care and prevention  These self-care steps can help prevent future infections:  · Drink plenty of fluids to prevent dehydration and flush out your bladder. Do this unless you must restrict fluids for other health reasons, or your doctor told you not to.  · Proper cleaning after going to the bathroom is important. Wipe from front to back after using the toilet to prevent the spread of bacteria.  · Urinate more often. Don't try to hold urine in for a long time.  · Wear loose-fitting clothes and cotton underwear. Avoid tight-fitting pants.  · Improve your diet and prevent constipation. Eat more fresh fruit and vegetables, and fiber, and less junk and fatty foods.  · Avoid sex until your symptoms are gone.  · Avoid caffeine, alcohol, and spicy foods. These can irritate your bladder.  · Urinate right after intercourse to flush out your bladder.  · If you use birth control pills and have frequent bladder infections, discuss it with your doctor.  Follow-up care  Call your healthcare provider if all symptoms are not gone after 3  days of treatment. This is especially important if you have repeat infections.  If a culture was done, you will be told if your treatment needs to be changed. If directed, you can call to find out the results.  If X-rays were done, you will be told if the results will affect your treatment.  Call 911  Call 911 if any of the following occur:  · Trouble breathing  · Hard to wake up or confusion  · Fainting or loss of consciousness  · Rapid heart rate  When to seek medical advice  Call your healthcare provider right away if any of these occur:  · Fever of 100.4ºF (38.0ºC) or higher, or as directed by your healthcare provider  · Symptoms are not better by the third day of treatment  · Back or belly (abdominal) pain that gets worse  · Repeated vomiting, or unable to keep medicine down  · Weakness or dizziness  · Vaginal discharge  · Pain, redness, or swelling in the outer vaginal area (labia)  Date Last Reviewed: 10/1/2016  © 4665-2394 [employer]. [address] [place] [address], [place] [address]. All rights reserved. This information is not intended as a substitute for professional medical care. Always follow your healthcare professional's instructions.      Please follow up with your Primary care provider within 2-5 days if your signs and symptoms have not resolved or worsen.     If your condition worsens or fails to improve we recommend that you receive another evaluation at the emergency room immediately or contact your primary medical clinic to discuss your concerns.   You must understand that you have received an Urgent Care treatment only and that you may be released before all of your medical problems are known or treated. You, the patient, will arrange for follow up care as instructed.     RED FLAGS/WARNING SYMPTOMS DISCUSSED WITH PATIENT THAT WOULD WARRANT EMERGENT MEDICAL ATTENTION. PATIENT VERBALIZED UNDERSTANDING.

## 2018-10-15 ENCOUNTER — PATIENT MESSAGE (OUTPATIENT)
Dept: ADMINISTRATIVE | Facility: OTHER | Age: 68
End: 2018-10-15

## 2018-10-15 DIAGNOSIS — E11.9 TYPE 2 DIABETES MELLITUS: ICD-10-CM

## 2018-10-17 ENCOUNTER — PATIENT MESSAGE (OUTPATIENT)
Dept: FAMILY MEDICINE | Facility: CLINIC | Age: 68
End: 2018-10-17

## 2018-10-18 ENCOUNTER — PATIENT MESSAGE (OUTPATIENT)
Dept: FAMILY MEDICINE | Facility: CLINIC | Age: 68
End: 2018-10-18

## 2018-10-19 ENCOUNTER — PATIENT MESSAGE (OUTPATIENT)
Dept: UROLOGY | Facility: CLINIC | Age: 68
End: 2018-10-19

## 2018-10-19 LAB
BACTERIA UR CULT: ABNORMAL
BACTERIA UR CULT: ABNORMAL
OTHER ANTIBIOTIC SUSC ISLT: ABNORMAL

## 2018-10-22 ENCOUNTER — CLINICAL SUPPORT (OUTPATIENT)
Dept: REHABILITATION | Facility: OTHER | Age: 68
End: 2018-10-22
Attending: PHYSICAL MEDICINE & REHABILITATION
Payer: MEDICARE

## 2018-10-22 DIAGNOSIS — M54.42 CHRONIC LEFT-SIDED LOW BACK PAIN WITH LEFT-SIDED SCIATICA: ICD-10-CM

## 2018-10-22 DIAGNOSIS — G89.29 CHRONIC LEFT-SIDED LOW BACK PAIN WITH LEFT-SIDED SCIATICA: ICD-10-CM

## 2018-10-22 PROCEDURE — 97110 THERAPEUTIC EXERCISES: CPT | Performed by: PHYSICAL THERAPIST

## 2018-10-22 PROCEDURE — G8978 MOBILITY CURRENT STATUS: HCPCS | Mod: CL | Performed by: PHYSICAL THERAPIST

## 2018-10-22 PROCEDURE — 97162 PT EVAL MOD COMPLEX 30 MIN: CPT | Performed by: PHYSICAL THERAPIST

## 2018-10-22 PROCEDURE — G8979 MOBILITY GOAL STATUS: HCPCS | Mod: CK | Performed by: PHYSICAL THERAPIST

## 2018-10-22 NOTE — PLAN OF CARE
[facility] - PHYSICAL THERAPY EVALUATION     Name: Vivienne Jose  Clinic Number: [id number]      Diagnosis:   Encounter Diagnosis   Name Primary?    Chronic left-sided low back pain with left-sided sciatica      Physician: Amelia Antunez MD  Treatment Orders: PT Eval and Treat    Past Medical History:   Diagnosis Date    Allergy     Angio-edema     Arthritis     Cataract     Cerebrovascular malformation     Colon polyps     Coronary artery disease     Diabetes mellitus, type 2     Diabetic peripheral neuropathy     GERD (gastroesophageal reflux disease)     Herpes infection     Hyperlipidemia     Hypertension     Hypothyroidism     Kidney stones     Nausea & vomiting 11/23/2015    Obesity, morbid     Ovarian cyst     Pyelonephritis, chronic     Seizure disorder, focal motor     Sleep apnea     Type II or unspecified type diabetes mellitus with neurological manifestations, uncontrolled(250.62)     Urinary tract infection     Urticaria     Vaginal infection      Current Outpatient Medications   Medication Sig    amLODIPine (NORVASC) 5 MG tablet Take 1 tablet (5 mg total) by mouth once daily.    ascorbic acid, vitamin C, (VITAMIN C) 500 mg Chew     aspirin (ECOTRIN) 81 MG EC tablet Take 81 mg by mouth once daily.    azelastine (ASTELIN) 137 mcg (0.1 %) nasal spray 2 sprays (274 mcg total) by Nasal route 2 (two) times daily.    biotin 10,000 mcg Cap Take by mouth.    blood sugar diagnostic (TRUE METRIX GLUCOSE TEST STRIP) Strp TEST TWO TIMES DAILY    blood-glucose meter Misc [employer] True Metrix Air meter    calcium carbonate-vitamin D3 600 mg(1,500mg) -100 unit Cap Take 1 tablet by mouth once daily.    chlorthalidone (HYGROTEN) 25 MG Tab Take 1 tablet (25 mg total) by mouth once daily.    diclofenac sodium (VOLTAREN) 1 % Gel Apply 2 g topically 4 (four) times daily.    econazole nitrate 1 % cream Mix with hydrocortisone cream 1% 30 grams use bid prn    fluticasone  "(FLONASE) 50 mcg/actuation nasal spray 2 sprays (100 mcg total) by Each Nare route once daily.    gabapentin (NEURONTIN) 300 MG capsule Take 2 capsules (600 mg total) by mouth every evening.    glucagon (human recombinant) inj 1mg/mL kit Inject 1 mL (1 mg total) into the muscle as needed.    insulin detemir U-100 (LEVEMIR FLEXTOUCH U-100 INSULN) 100 unit/mL (3 mL) SubQ InPn pen Inject 16 Units into the skin every evening.    ketotifen (ZADITOR) 0.025 % (0.035 %) ophthalmic solution Place 1-2 drops into both eyes once daily.    L.acid-B.bifidum-B.animal-FOS (PROBIOTIC COMPLEX) 25 billion cell -100 mg Cap Take 1 capsule by mouth once daily.    lancets (TRUEPLUS LANCETS) 28 gauge Misc Inject 1 lancet into the skin 2 (two) times daily before meals.    levothyroxine (SYNTHROID) 75 MCG tablet TAKE 1 TABLET EVERY DAY    liraglutide 0.6 mg/0.1 mL, 18 mg/3 mL, subq PNIJ (VICTOZA 3-TAWANA) 0.6 mg/0.1 mL (18 mg/3 mL) PnIj INJECT 1.8 MG INTO THE SKIN ONCE DAILY.    loratadine (CLARITIN) 10 mg tablet Take 10 mg by mouth once daily.    LORazepam (ATIVAN) 0.5 MG tablet TK 1 T PO 1 HOUR B MRI    lovastatin (MEVACOR) 40 MG tablet TAKE 1 TABLET NIGHTLY (SUBSTITUTED FOR MEVACOR)    magnesium oxide-Mg AA chelate (MAGNESIUM, AMINO ACID CHELATE,) 133 mg Tab Take by mouth as directed.     metFORMIN (GLUCOPHAGE) 1000 MG tablet Take 1 tablet (1,000 mg total) by mouth 2 (two) times daily with meals.    methylPREDNISolone (MEDROL DOSEPACK) 4 mg tablet use as directed    omeprazole (PRILOSEC) 20 MG capsule Take 1 capsule (20 mg total) by mouth daily as needed.    pen needle, diabetic (BD ULTRA-FINE EDUARDO PEN NEEDLES) 32 gauge x 5/32" Ndle USE WITH VICTOZA AND LEVEMIR (2 INJECTIONS EVERY DAY)    potassium chloride SA (K-DUR,KLOR-CON) 10 MEQ tablet Take 10 meq PO QD except on Mondays, Wednesdays and Fridays, on which she should take 2 tabs (20 meq) QD    PREMARIN vaginal cream Place 0.5 g vaginally 3 (three) times a week.    " repaglinide (PRANDIN) 2 MG tablet Take 1 tablet (2 mg total) by mouth 3 (three) times daily before meals.    valACYclovir (VALTREX) 500 MG tablet Take 1 tablet (500 mg total) by mouth once daily.    valsartan (DIOVAN) 160 MG tablet Take 1 tablet (160 mg total) by mouth 2 (two) times daily.     Current Facility-Administered Medications   Medication    Allergy Mix     Review of patient's allergies indicates:   Allergen Reactions    Sulfa (sulfonamide antibiotics) Nausea Only    Ciprofloxacin     Januvia [sitagliptin]      abd pain    Lisinopril     Lotensin [benazepril]     Nexium [esomeprazole magnesium] Other (See Comments)     Gas       Precautions: L knee OA/pain, HTN, diabetes     Pattern of pain determined: 2    Evaluation Date: 10/22/2018  Authorization Period Expiration: 12/31/18  Plan of Care Expiration: 1/21/18  Reassessment Due: 11/22/18  Visit # / Visits authorized: 1/ 20    Time In: 2:00pm  Time Out: 3:38pm  Total Billable Time: 98 minutes     HISTORY   History of Present Illness: she started having back pain around 5 years ago. Her pain starts out across her lower back then it extends down her L hip, sometimes all the way to her toes. She has partial numbness in her toes on her L foot. She retired in 2015, she tried therapy around that time and it did help, she had dry needling at that time.  She had stopped performing her previous HEP, she also started having R knee pain a couple of years ago. She finds that she shifts her weight onto her L LE most of the time, she thinks a combination of stopping her HEP and over using her L side has caused an increase in her pain again.         Diagnostic Tests: From EPIC MRI  FINDINGS:  There is a subtle anterolisthesis of L4 relative to L5.  The vertebral body heights are well maintained.  The disc spaces are relatively well maintained.  No fracture, no osseous lesions.  No abnormal bone marrow replacement process.    T12-L1: Unremarkable    L1-L2: Minimal  disc bulge, no central canal stenosis or foraminal narrowing.    L2-L3: Subtle right paracentral disc protrusion, no central canal stenosis or foraminal narrowing, the facet joints appear normal.    L3-L4: Mild ligamentum flavum hypertrophy, no central canal stenosis or foraminal narrowing.    L4-5: Mild disc bulge, mild facet joint osseous hypertrophy.  Mild central canal stenosis.  Mild bilateral foraminal narrowing.    L5-S1: No central canal stenosis or foraminal narrowing.  There is mild facet joint degenerative change.    There are bilateral kidney cyst.      Impression       Mild spondylosis of the lumbar spine unchanged from the prior study.  No severe central canal stenosis or foraminal narrowing.           Pain Scale: Vivienne rates pain on a scale of 0-10 to be 6 at worst; 3 currently; 0 at best using VAS.   Pain location: L side Low back    Aggravating factors: standing, walking, house work, laying on her back  Easing Factors: sitting  Disturbed Sleep: no    Pattern of pain questions:  1.  Where is your pain the worst? Back   2.  Is your pain constant or intermittent? intermittent  3.  Does bending forward make your typical pain worse? no  4.  Since the start of your back pain, has there been a change in your bowel or bladder? no  5.  What can't you do now that you use to be able to do? Go for walks, cleaning the house (vaccuum, sweep, washing), cooking, standing to shower    Prior Treatment: PT, dry needling  Prior functional status: independent with all activities  DME owned/used: built in bench in shower  Occupation:  Part time clerical work, sits at computer and [employer]   Leisure: computer,                      Pts goals:  To be able to walk further    Red Flag Screening:   Cough  Sneeze  Strain: (--)  Bladder/ bowel: (--)  Falls: (--)  Night pain: (--)  Unexplained weight loss: (--)  General health: fair    OBJECTIVE     Postural examination/scapula alignment: Rounded shoulder, Head forward  and Posterior pelvic tilt  Joint integrity: NT    Sitting: forward head, rounded shoulders, PPT  Standing: forward head, rounded shoulders, anterior pelvic tilt  Correction of posture: no effect with lumbar roll    MOVEMENT LOSS    ROM Loss   Flexion moderate loss, no curve reversal   Extension moderate loss   Side bending Right minimal loss   Side bending Left minimal loss   Rotation Right minimal loss   Rotation Left minimal loss     Lower Extremity Strength  Right LE  Left LE    Hip flexion: 4+/5 Hip flexion: 4+/5   Hip extension:  NT Hip extension: NT   Hip abduction: 5/5 Hip abduction: 5/5         Hip Internal rotation   5/5 Hip Internal rotation 5/5   Knee Flexion 4+/5 Knee Flexion 4+/5   Knee Extension 5/5 Knee Extension 5/5   Ankle dorsiflexion: 5/5 Ankle dorsiflexion: 5/5   Ankle plantarflexion: 5/5 Ankle plantarflexion: 5/5       GAIT:  Assistive Device used: none  Level of Assistance: independent  Patient displays the following gait deviations:  decreased step length, increased base of support, antalgic gait and decreased stance time on L, decreased knee ext during stance on L.     Special Tests:   Test Name  Test Result   Prone Instability Test NT   Straight Leg Raise (--)   Neural Tension Test (--)   Crossed Straight Leg Raise (--)       NEUROLOGICAL SCREENING     Sensory deficit: decreased light touch L toes    Reflexes:    Left Right   Patella Tendon 2+ 2+   Achilles Tendon 2+ 2+   Babinski  (--) (--)   Clonus (--) (--)     REPEATED TEST MOVEMENTS:  Repeated Flexion in Standing worse, L hip pain   Repeated Extension in Standing better,    Repeated Flexion in lying no effect   Repeated Extension in lying  no effect       STATIC TESTS   Sitting slouched  no effect   Sitting erect no effect   Standing slouched worse   Standing erect  worse       Baseline Isometric Testing on Med X equipment: Testing administered by PT  Date of testing: 10/22/18  ROM 0-30 deg   Max Peak Torque 117    Min Peak Torque 25     Flex/Ext Ratio 4.68:1   % below normative data 18   Counter weight 241   femur 6   Seat pad 0           CMS Impairment/Limitation/Restriction for FOTO lumbar Survey    Therapist reviewed FOTO scores for Vivienne Jose on 10/22/2018.   FOTO documents entered into [employer] - see Media section.    Limitation Score: 63%  Category: Mobility    Current : CL = least 60% but < 80% impaired, limited or restricted  Goal: CK = at least 40% but < 60% impaired, limited or restricted             Treatment   Time In: 2:45  Time Out: 3:15    PT Evaluation Completed? Yes  Discussed Plan of Care with patient: Yes      Home Exercise Program as follows: see pt instructions for HEP. Seated flexion  Handouts were given to the patient. Pt demo good understanding of the education provided. Vivienne demonstrated good return demonstration of activities.     - Patient received education regarding proper posture and body mechanics.  Patient was given top 10 tips handout which discusses posture seated, standing, lifting correctly, components of exercise, importance of nutrition and hydration, and importance of sleep.  - Hien roll tried, recommended, and purchase information was provided.    - Patient received a handout regarding anticipated muscular soreness following the isometric test and strategies for management were reviewed with patient including stretching, using ice and scheduled rest.       Pt was instructed in and performed the following:   Cardiovascular exercise and therapeutic exercise to improve posture, lumbar/cervical ROM, strength, and muscular endurance as follows:     Vivienne received therapeutic exercises to develop/improve posture, lumbar/cervical ROM, strength and muscular endurance for 25 minutes including the following exercises:   Seated flexion: x 15  Standing flexion : x 10  Supine flexion: x 10 with ball  Standing extension x 10  Prone press ups x 10    [facility] 10/22/2018   Visit Number 1   VAS Pain Rating 3    Lumbar Extension Seat Pad 0   Femur Restraint 6   Top Dead Center 24   Counterweight 241   Lumbar Flexion 30   Lumbar Extension 0   Lumbar Peak Torque 117   Min Torque 25   Test Percent Below Normative Data 18   Ice - Sitting 5         Assessment   This is a 68 y.o. female referred to [facility] and presents with a medical diagnosis of low back pain and demonstrates limitations as described below in the problem list. Pt rehab potential is Fair. Pt presents with low back pain and L LE pain that is exacerbated while standing and is relieved with sitting. Pt has pain after standing for 5 minutes. Pt presented with L hip pain after repeated flexion in standing but this may be from shifting all of her weight on her L LE while standing and flexing due to her L knee pain, her hip felt better with extension in standing because she was able to evenly distribute her weight across both LEs and extension did not aggrevate her knee. She demonstrated no directional preference with seated or lying exercises. Pt has decreased flexion ROM with lack of curve reversal. She presents as a flexion responder at this time. Pt repeatedly made jokes about her weight during evaluation, pt may benefit from health  visit to discuss nutrition and weight loss strategies.     Pain Pattern: 2       Patient received education on the [facility] program, purpose of the isometric test, progression of back strengthening as well as wellness approach and systemic strengthening.  Details of the program were discussed.  Reviewed that patient should feel support/pressure from med ex restraints but no pain or discomfort and patient expressed understanding.    Based on the above history and physical examination an active physical therapy program is recommended.  Pt will continue to benefit from skilled outpatient physical therapy to address the deficits listed below in the chart, provide pt/family education and to maximize pt's level of  independence in the home and community environment. .     No environmental, cultural, spiritual, developmental or education needs expressed or noted    Medical necessity is demonstrated by the following problem list.    Pt presents with the following impairments:     History  Co-morbidities and personal factors that may impact the plan of care Co-morbidities:   CAD, diabetes, HTN and OA, knee pain, obesity    Personal Factors:   coping style     moderate   Examination  Body Structures and Functions, activity limitations and participation restrictions that may impact the plan of care Body Regions:   back  lower extremities    Body Systems:    ROM  strength  gait    Participation Restrictions:   Walking, standing, house work, cooking showering    Activity limitations:   Learning and applying knowledge  no deficits    General Tasks and Commands  no deficits    Communication  no deficits    Mobility  lifting and carrying objects  walking    Self care  washing oneself (bathing, drying, washing hands)    Domestic Life  shopping  cooking  doing house work (cleaning house, washing dishes, laundry)    Interactions/Relationships  no deficits    Life Areas  no deficits    Community and Social Life  no deficits         moderate   Clinical Presentation stable and uncomplicated low   Decision Making/ Complexity Score: moderate       GOALS: Pt is in agreement with the following goals.    Short term goals:  6 weeks or 10 visits   1.  Pt will demonstrate increased lumbar ROM by at least 3 degrees from the initial ROM value with improvements noted in functional ROM and ability to perform ADLs  2.  Pt will demonstrate increased maximum isometric torque value by 5% when compared to the initial value resulting in improved ability to perform bending, lifting, and carrying activities safely, confidently.    3.  Patient report a reduction in worst pain score by 1-2 points for improved tolerance during work and recreational activities  4.   "Pt able to perform HEP correctly with minimal cueing or supervision for therapist      Long term goals: 13 weeks or 20 visits   1. Pt will demonstrate increased lumbar ROM by at least 6 degrees from initial ROM value, resulting in improved ability to perform functional fwd bending while standing and sitting.   2. Pt will demonstrate increased maximum isometric torque value by 10% when compared to the initial value resulting in improved ability to perform bending, lifting, and carrying activities safely, confidently.  3. Pt to demonstrate ability to independently control and reduce their pain through posture positioning and mechanical movements throughout a typical day.  4.  Patient will demonstrate improved overall function per FOTO Survey to CK = at least 40% but < 60% impaired, limited or restricted score or less.  5. Pt to begin a progressive walking program in order to increase her physical activity for ADLs and exercising.     Plan   Outpatient physical therapy 2x week for 13 weeks or 20 visits to include the following:   - Patient education  - Therapeutic exercise  - Manual therapy  - Performance testing   - Neuromuscular Re-education  - Therapeutic activity   - Modalities    Pt may be seen by PTA as part of the rehabilitation team.     Therapist: Kevin Ladd, PT  10/22/2018    "I certify the need for these services furnished under this plan of treatment and while under my care."    ____________________________________  Physician/Referring Practitioner    _______________  Date of Signature            "

## 2018-10-25 ENCOUNTER — CLINICAL SUPPORT (OUTPATIENT)
Dept: REHABILITATION | Facility: OTHER | Age: 68
End: 2018-10-25
Attending: PHYSICAL MEDICINE & REHABILITATION
Payer: MEDICARE

## 2018-10-25 DIAGNOSIS — G89.29 CHRONIC LEFT-SIDED LOW BACK PAIN WITH LEFT-SIDED SCIATICA: Primary | ICD-10-CM

## 2018-10-25 DIAGNOSIS — M54.42 CHRONIC LEFT-SIDED LOW BACK PAIN WITH LEFT-SIDED SCIATICA: Primary | ICD-10-CM

## 2018-10-25 PROCEDURE — 97110 THERAPEUTIC EXERCISES: CPT

## 2018-10-25 NOTE — PROGRESS NOTES
"[facility]      Name: Vivienne Jose  Clinic Number: [id number]    Date of Treatment: 10/25/2018     Diagnosis:   Encounter Diagnosis   Name Primary?    Chronic left-sided low back pain with left-sided sciatica Yes     Physician: Rosa Ham PA-C       Precautions: L knee OA/pain, HTN, diabetes     Pattern of pain determined: 2     Evaluation Date: 10/22/2018  Authorization Period Expiration: 12/31/18  Plan of Care Expiration: 1/21/18  Reassessment Due: 11/22/18  Visit # / Visits authorized: 2/ 20 (NB: No lap belt on machine)  Medicare charges: (213)    Time In: 0705  Time Out: 0800  Total Billable Time: 55 minutes     Subjective   Vivienne reports "my back is fine, it is my R knee that is bothering me right now"    Patient reports tolerating previous visit fair.    Patient reports their pain to be 0/10 on a 0-10 scale with 0 being no pain and 10 being the worst pain imaginable.  Pain Location: L side Low back       Occupation:  Part time clerical work, sits at computer and scans documents   Leisure: computer,                      Pts goals:  To be able to walk further    Objective        Baseline Isometric Testing on Med X equipment: Testing administered by PT  Date of testing: 10/22/18  ROM 0-30 deg   Max Peak Torque 117    Min Peak Torque 25    Flex/Ext Ratio 4.68:1   % below normative data 18   Counter weight 241   femur 6   Seat pad 0               CMS Impairment/Limitation/Restriction for FOTO lumbar Survey     Therapist reviewed FOTO scores for Vivienne Jose on 10/22/2018.   FOTO documents entered into [employer] - see Media section.     Limitation Score: 63%  Category: Mobility     Current : CL = least 60% but < 80% impaired, limited or restricted  Goal: CK = at least 40% but < 60% impaired, limited or restricted                  Treatment    Home Exercise Program as follows: see pt instructions for HEP. Seated flexion  Handouts were given to the patient. Pt demo good " understanding of the education provided. Vivienne demonstrated good return demonstration of activities.      - Patient received education regarding proper posture and body mechanics.  Patient was given top 10 tips handout which discusses posture seated, standing, lifting correctly, components of exercise, importance of nutrition and hydration, and importance of sleep.  - Hien roll tried, recommended, and purchase information was provided.     - Patient received a handout regarding anticipated muscular soreness following the isometric test and strategies for management were reviewed with patient including stretching, using ice and scheduled rest.         Pt was instructed in and performed the following:   Cardiovascular exercise and therapeutic exercise to improve posture, lumbar/cervical ROM, strength, and muscular endurance as follows:      Vivienne received therapeutic exercises to develop/improve posture, lumbar/cervical ROM, strength and muscular endurance for 52 minutes including the following exercises:     NB: No lap belt    [facility] 10/25/2018   Visit Number -   VAS Pain Rating 0   Time 5   Flexion in Lying 10   Flexion in Sitting 10   Lumbar Extension Seat Pad -   Femur Restraint -   Top Dead Center -   Counterweight -   Lumbar Flexion -   Lumbar Extension -   Lumbar Peak Torque -   Min Torque -   Test Percent Below Normative Data -   Lumbar Weight 30   Repetitions 20   Rating of Perceived Exertion 2   Ice - Sitting 10       Seated flexion: x 15  Standing flexion : x 10  Supine flexion: x 10 with ball  Standing extension x 10  Prone press ups x 10      NEUROMUSCULAR: 3 Minutes  Taping R knee for pain and stability.    Assessment     Patient completed 20 reps at 30# with an RPE of 2 today (no lap belt, just FR); increase lumbar weight by 10% t next visit. Patient c/o a lot of R knee pain. PT completed taping to address pain and knee mobility. Patient completed peripheral machines as tolerated. She  denied LBP post exercises.     Patient is making good progress towards established goals.  Pt will continue to benefit from skilled outpatient physical therapy to address the deficits stated in the impairment chart, provide pt/family education and to maximize pt's level of independence in the home and community environment.       Pt's spiritual, cultural and educational needs considered and pt agreeable to plan of care and goals as stated below:     Medical necessity is demonstrated by the following IMPAIRMENTS/PROBLEMS:    Pt presents with the following impairments:      History  Co-morbidities and personal factors that may impact the plan of care Co-morbidities:   CAD, diabetes, HTN and OA, knee pain, obesity     Personal Factors:   coping style       moderate   Examination  Body Structures and Functions, activity limitations and participation restrictions that may impact the plan of care Body Regions:   back  lower extremities     Body Systems:    ROM  strength  gait     Participation Restrictions:   Walking, standing, house work, cooking showering     Activity limitations:   Learning and applying knowledge  no deficits     General Tasks and Commands  no deficits     Communication  no deficits     Mobility  lifting and carrying objects  walking     Self care  washing oneself (bathing, drying, washing hands)     Domestic Life  shopping  cooking  doing house work (cleaning house, washing dishes, laundry)     Interactions/Relationships  no deficits     Life Areas  no deficits     Community and Social Life  no deficits             moderate   Clinical Presentation stable and uncomplicated low   Decision Making/ Complexity Score: moderate         GOALS: Pt is in agreement with the following goals.     Short term goals:  6 weeks or 10 visits   1.  Pt will demonstrate increased lumbar ROM by at least 3 degrees from the initial ROM value with improvements noted in functional ROM and ability to perform ADLs  2.  Pt will  demonstrate increased maximum isometric torque value by 5% when compared to the initial value resulting in improved ability to perform bending, lifting, and carrying activities safely, confidently.     3.  Patient report a reduction in worst pain score by 1-2 points for improved tolerance during work and recreational activities  4.  Pt able to perform HEP correctly with minimal cueing or supervision for therapist        Long term goals: 13 weeks or 20 visits   1. Pt will demonstrate increased lumbar ROM by at least 6 degrees from initial ROM value, resulting in improved ability to perform functional fwd bending while standing and sitting.   2. Pt will demonstrate increased maximum isometric torque value by 10% when compared to the initial value resulting in improved ability to perform bending, lifting, and carrying activities safely, confidently.  3. Pt to demonstrate ability to independently control and reduce their pain through posture positioning and mechanical movements throughout a typical day.  4.  Patient will demonstrate improved overall function per FOTO Survey to CK = at least 40% but < 60% impaired, limited or restricted score or less.  5. Pt to begin a progressive walking program in order to increase her physical activity for ADLs and exercising.     Plan   Continue with established Plan of Care towards established PT goals.

## 2018-10-26 ENCOUNTER — CLINICAL SUPPORT (OUTPATIENT)
Dept: INTERNAL MEDICINE | Facility: CLINIC | Age: 68
End: 2018-10-26
Payer: MEDICARE

## 2018-10-26 ENCOUNTER — PATIENT OUTREACH (OUTPATIENT)
Dept: OTHER | Facility: OTHER | Age: 68
End: 2018-10-26

## 2018-10-26 DIAGNOSIS — J30.2 SEASONAL ALLERGIC RHINITIS, UNSPECIFIED TRIGGER: ICD-10-CM

## 2018-10-26 PROCEDURE — 95115 IMMUNOTHERAPY ONE INJECTION: CPT | Mod: PBBFAC,PO

## 2018-10-26 PROCEDURE — 99499 UNLISTED E&M SERVICE: CPT | Mod: S$PBB,,, | Performed by: ALLERGY & IMMUNOLOGY

## 2018-10-26 NOTE — PROGRESS NOTES
"Last 5 Patient Entered Readings                                      Current 30 Day Average: 133/65     Recent Readings 10/25/2018 10/25/2018 10/24/2018 10/24/2018 10/23/2018    SBP (mmHg) 139 139 138 138 136    DBP (mmHg) 65 65 63 63 67    Pulse 79 79 82 82 77          Last 6 Patient Entered Readings                                          Most Recent A1c: 7% on 7/27/2018  (Goal: 7%)     Recent Readings 10/26/2018 10/25/2018 10/25/2018 10/24/2018 10/24/2018    Blood Glucose (mg/dL) 148 257 176 278 187        Patient newly enrolled in [facility].   Digital Medicine: Health  Follow Up    Lifestyle Modifications:    1.Dietary Modifications (Sodium intake <2,000mg/day, food labels, dining out): Patient continues to dine out, and eat foods "that I know I shouldn't be eating".     2.Physical Activity: Mrs. Jose recently started the [facility] program. She has not been to they gym in a few weeks. She reports being "so busy", and "tired". Mrs. Jose also does not have any support to help keep her on track at home. She is trying to find ways to keep herself motivated. Her grandson is getting  this weekend, and hopes to get back into the gym next week.     3.Medication Therapy: Patient has been compliant with the medication regimen. Patient attributes some elevated BP and BG readings to 2 weeks of prednisone and UTI. She also attributes dietary indiscretions and lack of physical activity. She has also been under some stress, and "not feeling quite right". Mrs. Jose is "not trying to make excuses" for her elevated readings. Patient stated, "I know what I need to do, I just need do it". Asked if I could be of any assistance in improving lifestyle by sending resources or reviewing information, increasing call frequency to help keep on track, etc. Patient honestly answered "I don't think it would help". She is wanting more support within her own home.    4.Patient has the following medication side effects/concerns: " None  (Frequency/Alleviating factors/Precipitating factors, etc.)     Follow up with . Vivienne GARCÍA Manjeetad completed. No further questions or concerns. Will continue to follow up to achieve health goals.

## 2018-10-31 ENCOUNTER — CLINICAL SUPPORT (OUTPATIENT)
Dept: REHABILITATION | Facility: OTHER | Age: 68
End: 2018-10-31
Attending: PHYSICAL MEDICINE & REHABILITATION
Payer: MEDICARE

## 2018-10-31 DIAGNOSIS — M54.42 CHRONIC LEFT-SIDED LOW BACK PAIN WITH LEFT-SIDED SCIATICA: Primary | ICD-10-CM

## 2018-10-31 DIAGNOSIS — G89.29 CHRONIC LEFT-SIDED LOW BACK PAIN WITH LEFT-SIDED SCIATICA: Primary | ICD-10-CM

## 2018-10-31 PROCEDURE — 97110 THERAPEUTIC EXERCISES: CPT | Mod: HCNC

## 2018-10-31 NOTE — PROGRESS NOTES
[facility]      Name: Vivienne Jose  Clinic Number: [id number]    Date of Treatment: 10/31/2018     Diagnosis:   Encounter Diagnosis   Name Primary?    Chronic left-sided low back pain with left-sided sciatica Yes     Physician: Amelia Antunez, *       Precautions: L knee OA/pain, HTN, diabetes     Pattern of pain determined: 2     Evaluation Date: 10/22/2018  Authorization Period Expiration: 12/31/18  Plan of Care Expiration: 1/21/18  Reassessment Due: 11/22/18  Visit # / Visits authorized: 2/ 20 (NB: No lap belt on machine)  Medicare charges: (648)    Time In: 1:30  Time Out: 2:30  Total Billable Time: 55 minutes     Subjective   Vivienne reports her back has no pain, but her  R knee that is bothering me right now.         Patient reports their pain to be 0/10 on a 0-10 scale with 0 being no pain and 10 being the worst pain imaginable.  Pain Location: L side Low back       Occupation:  Part time clerical work, sits at computer and scans documents   Leisure: computer,                      Pts goals:  To be able to walk further    Objective        Baseline Isometric Testing on Med X equipment: Testing administered by PT  Date of testing: 10/22/18  ROM 0-30 deg   Max Peak Torque 117    Min Peak Torque 25    Flex/Ext Ratio 4.68:1   % below normative data 18   Counter weight 241   femur 6   Seat pad 0               CMS Impairment/Limitation/Restriction for FOTO lumbar Survey     Therapist reviewed FOTO scores for Vivienne Jose on 10/22/2018.   FOTO documents entered into [employer] - see Media section.     Limitation Score: 63%  Category: Mobility     Current : CL = least 60% but < 80% impaired, limited or restricted  Goal: CK = at least 40% but < 60% impaired, limited or restricted                  Treatment    Home Exercise Program as follows: see pt instructions for HEP. Seated flexion  Handouts were given to the patient. Pt demo good understanding of the education provided. Vivienne  demonstrated good return demonstration of activities.      [facility] 10/31/2018   Visit Number 3   VAS Pain Rating 0   Time 10   Flexion in Lying 10   Flexion in Sitting 10   Lumbar Extension Seat Pad -   Femur Restraint -   Top Dead Center -   Counterweight -   Lumbar Flexion -   Lumbar Extension -   Lumbar Peak Torque -   Min Torque -   Test Percent Below Normative Data -   Lumbar Weight 34   Repetitions 20   Rating of Perceived Exertion 3   Ice - Sitting 10       - Patient received education regarding proper posture and body mechanics.  Patient was given top 10 tips handout which discusses posture seated, standing, lifting correctly, components of exercise, importance of nutrition and hydration, and importance of sleep.  - Hien roll tried, recommended, and purchase information was provided.     - Patient received a handout regarding anticipated muscular soreness following the isometric test and strategies for management were reviewed with patient including stretching, using ice and scheduled rest.         Pt was instructed in and performed the following:   Cardiovascular exercise and therapeutic exercise to improve posture, lumbar/cervical ROM, strength, and muscular endurance as follows:      Vivienne received therapeutic exercises to develop/improve posture, lumbar/cervical ROM, strength and muscular endurance for 52 minutes including the following exercises:     NB: possibly No lap belt         Seated flexion: x 15  Standing flexion : x 10  Supine flexion: x 10 with ball  Standing extension x 10  Prone press ups x 10      NEUROMUSCULAR:   Taping R knee for pain and stability.    Assessment     Patient completed 20 reps with a weight increase with an RPE of 3 today;  Patient c/o a lot of R knee pain. Educated pt to cont to stretch despite she is feeling better. Patient completed peripheral machines as tolerated. She denied LBP post exercises.     Patient is making good progress towards established  goals.  Pt will continue to benefit from skilled outpatient physical therapy to address the deficits stated in the impairment chart, provide pt/family education and to maximize pt's level of independence in the home and community environment.       Pt's spiritual, cultural and educational needs considered and pt agreeable to plan of care and goals as stated below:     Medical necessity is demonstrated by the following IMPAIRMENTS/PROBLEMS:    Pt presents with the following impairments:      History  Co-morbidities and personal factors that may impact the plan of care Co-morbidities:   CAD, diabetes, HTN and OA, knee pain, obesity     Personal Factors:   coping style       moderate   Examination  Body Structures and Functions, activity limitations and participation restrictions that may impact the plan of care Body Regions:   back  lower extremities     Body Systems:    ROM  strength  gait     Participation Restrictions:   Walking, standing, house work, cooking showering     Activity limitations:   Learning and applying knowledge  no deficits     General Tasks and Commands  no deficits     Communication  no deficits     Mobility  lifting and carrying objects  walking     Self care  washing oneself (bathing, drying, washing hands)     Domestic Life  shopping  cooking  doing house work (cleaning house, washing dishes, laundry)     Interactions/Relationships  no deficits     Life Areas  no deficits     Community and Social Life  no deficits             moderate   Clinical Presentation stable and uncomplicated low   Decision Making/ Complexity Score: moderate         GOALS: Pt is in agreement with the following goals.     Short term goals:  6 weeks or 10 visits   1.  Pt will demonstrate increased lumbar ROM by at least 3 degrees from the initial ROM value with improvements noted in functional ROM and ability to perform ADLs  2.  Pt will demonstrate increased maximum isometric torque value by 5% when compared to the  initial value resulting in improved ability to perform bending, lifting, and carrying activities safely, confidently.     3.  Patient report a reduction in worst pain score by 1-2 points for improved tolerance during work and recreational activities  4.  Pt able to perform HEP correctly with minimal cueing or supervision for therapist        Long term goals: 13 weeks or 20 visits   1. Pt will demonstrate increased lumbar ROM by at least 6 degrees from initial ROM value, resulting in improved ability to perform functional fwd bending while standing and sitting.   2. Pt will demonstrate increased maximum isometric torque value by 10% when compared to the initial value resulting in improved ability to perform bending, lifting, and carrying activities safely, confidently.  3. Pt to demonstrate ability to independently control and reduce their pain through posture positioning and mechanical movements throughout a typical day.  4.  Patient will demonstrate improved overall function per FOTO Survey to CK = at least 40% but < 60% impaired, limited or restricted score or less.  5. Pt to begin a progressive walking program in order to increase her physical activity for ADLs and exercising.     Plan   Continue with established Plan of Care towards established PT goals.

## 2018-11-02 ENCOUNTER — PATIENT MESSAGE (OUTPATIENT)
Dept: UROLOGY | Facility: CLINIC | Age: 68
End: 2018-11-02

## 2018-11-02 ENCOUNTER — OFFICE VISIT (OUTPATIENT)
Dept: URGENT CARE | Facility: CLINIC | Age: 68
End: 2018-11-02
Payer: MEDICARE

## 2018-11-02 VITALS
SYSTOLIC BLOOD PRESSURE: 152 MMHG | DIASTOLIC BLOOD PRESSURE: 63 MMHG | HEART RATE: 89 BPM | OXYGEN SATURATION: 95 % | TEMPERATURE: 98 F | RESPIRATION RATE: 18 BRPM | BODY MASS INDEX: 43.98 KG/M2 | WEIGHT: 239 LBS | HEIGHT: 62 IN

## 2018-11-02 DIAGNOSIS — N30.01 ACUTE CYSTITIS WITH HEMATURIA: Primary | ICD-10-CM

## 2018-11-02 LAB
BILIRUB UR QL STRIP: NEGATIVE
GLUCOSE UR QL STRIP: NEGATIVE
KETONES UR QL STRIP: NEGATIVE
LEUKOCYTE ESTERASE UR QL STRIP: POSITIVE
PH, POC UA: 7.5 (ref 5–8)
POC BLOOD, URINE: POSITIVE
POC NITRATES, URINE: NEGATIVE
PROT UR QL STRIP: NEGATIVE
SP GR UR STRIP: 1.01 (ref 1–1.03)
UROBILINOGEN UR STRIP-ACNC: NORMAL (ref 0.1–1.1)

## 2018-11-02 PROCEDURE — 99213 OFFICE O/P EST LOW 20 MIN: CPT | Mod: 25,S$GLB,, | Performed by: NURSE PRACTITIONER

## 2018-11-02 PROCEDURE — 81003 URINALYSIS AUTO W/O SCOPE: CPT | Mod: QW,S$GLB,, | Performed by: NURSE PRACTITIONER

## 2018-11-02 PROCEDURE — 99000 SPECIMEN HANDLING OFFICE-LAB: CPT | Mod: S$GLB,,, | Performed by: NURSE PRACTITIONER

## 2018-11-02 RX ORDER — CEFDINIR 300 MG/1
300 CAPSULE ORAL 2 TIMES DAILY
Qty: 10 CAPSULE | Refills: 0 | Status: SHIPPED | OUTPATIENT
Start: 2018-11-02 | End: 2018-11-07

## 2018-11-02 NOTE — PROGRESS NOTES
"Subjective:       Patient ID: Vivienne Jose is a 68 y.o. female.    Vitals:  height is 5' 2" (1.575 m) and weight is 108.4 kg (239 lb). Her temperature is 97.9 °F (36.6 °C). Her blood pressure is 152/63 (abnormal) and her pulse is 89. Her respiration is 18 and oxygen saturation is 95%.     Chief Complaint: Urinary Tract Infection    Urinary Tract Infection    This is a recurrent problem. The current episode started 1 to 4 weeks ago. The problem occurs every urination. The problem has been gradually worsening. The quality of the pain is described as burning (Throbbing ). The pain is at a severity of 3/10. There has been no fever. She is sexually active. Associated symptoms include chills, frequency and urgency. Pertinent negatives include no hematuria, nausea or vomiting. She has tried antibiotics for the symptoms. The treatment provided moderate relief. Her past medical history is significant for recurrent UTIs.     Review of Systems   Constitution: Positive for chills. Negative for fever.   Skin: Negative for itching.   Musculoskeletal: Positive for back pain.   Gastrointestinal: Negative for abdominal pain, nausea and vomiting.   Genitourinary: Positive for dysuria, frequency and urgency. Negative for genital sores, hematuria, missed menses and non-menstrual bleeding.       Objective:      Physical Exam   Constitutional: She is oriented to person, place, and time. She appears well-developed and well-nourished. She is cooperative.  Non-toxic appearance. She does not appear ill. No distress.   HENT:   Head: Normocephalic and atraumatic.   Right Ear: Hearing, tympanic membrane, external ear and ear canal normal.   Left Ear: Hearing, tympanic membrane, external ear and ear canal normal.   Nose: Nose normal. No mucosal edema, rhinorrhea or nasal deformity. No epistaxis. Right sinus exhibits no maxillary sinus tenderness and no frontal sinus tenderness. Left sinus exhibits no maxillary sinus tenderness and no frontal " sinus tenderness.   Mouth/Throat: Uvula is midline, oropharynx is clear and moist and mucous membranes are normal. No trismus in the jaw. Normal dentition. No uvula swelling. No posterior oropharyngeal erythema.   Eyes: Conjunctivae and lids are normal. Right eye exhibits no discharge. Left eye exhibits no discharge. No scleral icterus.   Sclera clear bilat   Neck: Trachea normal, normal range of motion, full passive range of motion without pain and phonation normal. Neck supple.   Cardiovascular: Normal rate, regular rhythm, normal heart sounds, intact distal pulses and normal pulses.   Pulmonary/Chest: Effort normal and breath sounds normal. No respiratory distress.   Abdominal: Soft. Normal appearance and bowel sounds are normal. She exhibits no distension, no pulsatile midline mass and no mass. There is no tenderness. There is no CVA tenderness.   Musculoskeletal: Normal range of motion. She exhibits no edema or deformity.   Neurological: She is alert and oriented to person, place, and time. She exhibits normal muscle tone. Coordination normal.   Skin: Skin is warm, dry and intact. She is not diaphoretic. No pallor.   Psychiatric: She has a normal mood and affect. Her speech is normal and behavior is normal. Judgment and thought content normal. Cognition and memory are normal.   Nursing note and vitals reviewed.      Assessment:       1. Acute cystitis with hematuria        Plan:         Acute cystitis with hematuria  -     Urine culture  -     cefdinir (OMNICEF) 300 MG capsule; Take 1 capsule (300 mg total) by mouth 2 (two) times daily. for 5 days  Dispense: 10 capsule; Refill: 0      Understanding Urinary Tract Infections (UTIs)  Most UTIs are caused by bacteria, although they may also be caused by viruses or fungi. Bacteria from the bowel are the most common source of infection. The infection may start because of any of the following:  · Sexual activity. During sex, bacteria can travel from the penis, vagina,  or rectum into the urethra.   · Bacteria on the skin outside the rectum may travel into the urethra. This is more common in women since the rectum and urethra are closer to each other than in men. Wiping from front to back after using the toilet and keeping the area clean can help prevent germs from getting to the urethra.  · Blockage of urine flow through the urinary tract. If urine sits too long, germs may start to grow out of control.      Parts of the urinary tract  The infection can occur in any part of the urinary tract.  · The kidneys collect and store urine.  · The ureters carry urine from the kidneys to the bladder.  · The bladder holds urine until you are ready to let it out.  · The urethra carries urine from the bladder out of the body. It is shorter in women, so bacteria can move through it more easily. The urethra is longer in men, so a UTI is less likely to reach the bladder or kidneys in men.  Date Last Reviewed: 1/1/2017  © 9434-5878 [employer]. [address] [place] [address]. All rights reserved. This information is not intended as a substitute for professional medical care. Always follow your healthcare professional's instructions.      Please follow up with your Primary care provider within 2-5 days if your signs and symptoms have not resolved or worsen.     If your condition worsens or fails to improve we recommend that you receive another evaluation at the emergency room immediately or contact your primary medical clinic to discuss your concerns.   You must understand that you have received an Urgent Care treatment only and that you may be released before all of your medical problems are known or treated. You, the patient, will arrange for follow up care as instructed.     RED FLAGS/WARNING SYMPTOMS DISCUSSED WITH PATIENT THAT WOULD WARRANT EMERGENT MEDICAL ATTENTION. PATIENT VERBALIZED UNDERSTANDING.

## 2018-11-02 NOTE — PATIENT INSTRUCTIONS
Understanding Urinary Tract Infections (UTIs)  Most UTIs are caused by bacteria, although they may also be caused by viruses or fungi. Bacteria from the bowel are the most common source of infection. The infection may start because of any of the following:  · Sexual activity. During sex, bacteria can travel from the penis, vagina, or rectum into the urethra.   · Bacteria on the skin outside the rectum may travel into the urethra. This is more common in women since the rectum and urethra are closer to each other than in men. Wiping from front to back after using the toilet and keeping the area clean can help prevent germs from getting to the urethra.  · Blockage of urine flow through the urinary tract. If urine sits too long, germs may start to grow out of control.      Parts of the urinary tract  The infection can occur in any part of the urinary tract.  · The kidneys collect and store urine.  · The ureters carry urine from the kidneys to the bladder.  · The bladder holds urine until you are ready to let it out.  · The urethra carries urine from the bladder out of the body. It is shorter in women, so bacteria can move through it more easily. The urethra is longer in men, so a UTI is less likely to reach the bladder or kidneys in men.  Date Last Reviewed: 1/1/2017  © 5695-4626 [employer]. [address] [place] [address], [place] [address]. All rights reserved. This information is not intended as a substitute for professional medical care. Always follow your healthcare professional's instructions.      Please follow up with your Primary care provider within 2-5 days if your signs and symptoms have not resolved or worsen.     If your condition worsens or fails to improve we recommend that you receive another evaluation at the emergency room immediately or contact your primary medical clinic to discuss your concerns.   You must understand that you have received an Urgent Care treatment only and that you may be  released before all of your medical problems are known or treated. You, the patient, will arrange for follow up care as instructed.     RED FLAGS/WARNING SYMPTOMS DISCUSSED WITH PATIENT THAT WOULD WARRANT EMERGENT MEDICAL ATTENTION. PATIENT VERBALIZED UNDERSTANDING.

## 2018-11-07 ENCOUNTER — TELEPHONE (OUTPATIENT)
Dept: URGENT CARE | Facility: CLINIC | Age: 68
End: 2018-11-07

## 2018-11-07 ENCOUNTER — PATIENT MESSAGE (OUTPATIENT)
Dept: UROLOGY | Facility: CLINIC | Age: 68
End: 2018-11-07

## 2018-11-07 LAB
BACTERIA UR CULT: ABNORMAL
BACTERIA UR CULT: ABNORMAL
OTHER ANTIBIOTIC SUSC ISLT: ABNORMAL

## 2018-11-07 NOTE — TELEPHONE ENCOUNTER
I spoke with patient and reported culture results.  Her urinary symptoms are improved, she took the Omnicef for 5 days.  No fever.  She feels improved, although not completely resolved.  Advised PCP follow-up----- Message from Bam Garza MA sent at 11/7/2018 10:13 AM CST -----      ----- Message -----  From: Alicia Hsieh MD  Sent: 11/7/2018   9:19 AM  To: , #    Please notify patient that urine culture result suggests that she is on the appropriate antibiotic. Recheck with PCP should problems continue.

## 2018-11-08 ENCOUNTER — CLINICAL SUPPORT (OUTPATIENT)
Dept: REHABILITATION | Facility: OTHER | Age: 68
End: 2018-11-08
Attending: PHYSICAL MEDICINE & REHABILITATION
Payer: MEDICARE

## 2018-11-08 DIAGNOSIS — M54.42 CHRONIC LEFT-SIDED LOW BACK PAIN WITH LEFT-SIDED SCIATICA: Primary | ICD-10-CM

## 2018-11-08 DIAGNOSIS — G89.29 CHRONIC LEFT-SIDED LOW BACK PAIN WITH LEFT-SIDED SCIATICA: Primary | ICD-10-CM

## 2018-11-08 PROCEDURE — 97110 THERAPEUTIC EXERCISES: CPT | Mod: HCNC

## 2018-11-12 ENCOUNTER — LAB VISIT (OUTPATIENT)
Dept: LAB | Facility: HOSPITAL | Age: 68
End: 2018-11-12
Attending: INTERNAL MEDICINE
Payer: MEDICARE

## 2018-11-12 DIAGNOSIS — E87.6 HYPOKALEMIA: ICD-10-CM

## 2018-11-12 DIAGNOSIS — Z79.4 TYPE 2 DIABETES MELLITUS WITHOUT COMPLICATION, WITH LONG-TERM CURRENT USE OF INSULIN: ICD-10-CM

## 2018-11-12 DIAGNOSIS — E11.59 HYPERTENSION ASSOCIATED WITH DIABETES: ICD-10-CM

## 2018-11-12 DIAGNOSIS — E11.9 TYPE 2 DIABETES MELLITUS WITHOUT COMPLICATION, WITH LONG-TERM CURRENT USE OF INSULIN: ICD-10-CM

## 2018-11-12 DIAGNOSIS — I15.2 HYPERTENSION ASSOCIATED WITH DIABETES: ICD-10-CM

## 2018-11-12 LAB
ALBUMIN SERPL BCP-MCNC: 3.2 G/DL
ALP SERPL-CCNC: 77 U/L
ALT SERPL W/O P-5'-P-CCNC: 29 U/L
ANION GAP SERPL CALC-SCNC: 12 MMOL/L
AST SERPL-CCNC: 30 U/L
BASOPHILS # BLD AUTO: 0.06 K/UL
BASOPHILS NFR BLD: 0.6 %
BILIRUB SERPL-MCNC: 0.4 MG/DL
BUN SERPL-MCNC: 13 MG/DL
CALCIUM SERPL-MCNC: 9.7 MG/DL
CHLORIDE SERPL-SCNC: 102 MMOL/L
CO2 SERPL-SCNC: 28 MMOL/L
CREAT SERPL-MCNC: 0.9 MG/DL
DIFFERENTIAL METHOD: ABNORMAL
EOSINOPHIL # BLD AUTO: 0.4 K/UL
EOSINOPHIL NFR BLD: 4.3 %
ERYTHROCYTE [DISTWIDTH] IN BLOOD BY AUTOMATED COUNT: 15.4 %
EST. GFR  (AFRICAN AMERICAN): >60 ML/MIN/1.73 M^2
EST. GFR  (NON AFRICAN AMERICAN): >60 ML/MIN/1.73 M^2
ESTIMATED AVG GLUCOSE: 174 MG/DL
GLUCOSE SERPL-MCNC: 178 MG/DL
HBA1C MFR BLD HPLC: 7.7 %
HCT VFR BLD AUTO: 41.8 %
HGB BLD-MCNC: 13.3 G/DL
IMM GRANULOCYTES # BLD AUTO: 0.04 K/UL
IMM GRANULOCYTES NFR BLD AUTO: 0.4 %
LYMPHOCYTES # BLD AUTO: 2.5 K/UL
LYMPHOCYTES NFR BLD: 24.4 %
MCH RBC QN AUTO: 30.2 PG
MCHC RBC AUTO-ENTMCNC: 31.8 G/DL
MCV RBC AUTO: 95 FL
MONOCYTES # BLD AUTO: 0.9 K/UL
MONOCYTES NFR BLD: 9.1 %
NEUTROPHILS # BLD AUTO: 6.2 K/UL
NEUTROPHILS NFR BLD: 61.2 %
NRBC BLD-RTO: 0 /100 WBC
PLATELET # BLD AUTO: 290 K/UL
PMV BLD AUTO: 10.9 FL
POTASSIUM SERPL-SCNC: 3.7 MMOL/L
PROT SERPL-MCNC: 7.2 G/DL
RBC # BLD AUTO: 4.41 M/UL
SODIUM SERPL-SCNC: 142 MMOL/L
WBC # BLD AUTO: 10.09 K/UL

## 2018-11-12 PROCEDURE — 85025 COMPLETE CBC W/AUTO DIFF WBC: CPT | Mod: HCNC

## 2018-11-12 PROCEDURE — 83036 HEMOGLOBIN GLYCOSYLATED A1C: CPT | Mod: HCNC

## 2018-11-12 PROCEDURE — 36415 COLL VENOUS BLD VENIPUNCTURE: CPT | Mod: HCNC,PO

## 2018-11-12 PROCEDURE — 80053 COMPREHEN METABOLIC PANEL: CPT | Mod: HCNC

## 2018-11-13 ENCOUNTER — CLINICAL SUPPORT (OUTPATIENT)
Dept: REHABILITATION | Facility: OTHER | Age: 68
End: 2018-11-13
Attending: PHYSICAL MEDICINE & REHABILITATION
Payer: MEDICARE

## 2018-11-13 DIAGNOSIS — M54.42 CHRONIC LEFT-SIDED LOW BACK PAIN WITH LEFT-SIDED SCIATICA: Primary | ICD-10-CM

## 2018-11-13 DIAGNOSIS — G89.29 CHRONIC LEFT-SIDED LOW BACK PAIN WITH LEFT-SIDED SCIATICA: Primary | ICD-10-CM

## 2018-11-13 PROCEDURE — 97110 THERAPEUTIC EXERCISES: CPT | Mod: HCNC

## 2018-11-13 NOTE — PROGRESS NOTES
[facility]      Name: Vivienne Jose  Clinic Number: [id number]    Date of Treatment: 11/13/2018     Diagnosis:   Encounter Diagnosis   Name Primary?    Chronic left-sided low back pain with left-sided sciatica Yes     Physician: Amelia Antunez, *       Precautions: L knee OA/pain, HTN, diabetes     Pattern of pain determined: 2     Evaluation Date: 10/22/2018  Authorization Period Expiration: 12/31/18  Plan of Care Expiration: 1/21/18  Reassessment Due: 11/22/18    Visit # / Visits authorized: 4/ 20 (NB: No lap belt on machine)  Medicare charges: (375)    Time In: 1430  Time Out: 1530  Total Billable Time: 34 minutes     Subjective   Vivienne reports  Not having any  L glut pain currently, as she has been in the clinic and rested a bit. She had a 4/10 pain earlier.  She c/o of R knee pain due to weather.     Patient reports their pain to be 0/10 on a 0-10 scale with 0 being no pain and 10 being the worst pain imaginable.  Pain Location: L side Low back       Occupation:  Part time clerical work, sits at computer and scans documents   Leisure: computer,                      Pts goals:  To be able to walk further    Objective        Baseline Isometric Testing on Med X equipment: Testing administered by PT  Date of testing: 10/22/18  ROM 0-30 deg   Max Peak Torque 117    Min Peak Torque 25    Flex/Ext Ratio 4.68:1   % below normative data 18   Counter weight 241   femur 6   Seat pad 0               CMS Impairment/Limitation/Restriction for FOTO lumbar Survey     Therapist reviewed FOTO scores for Vivienne Jose on 10/22/2018.   FOTO documents entered into [employer] - see Media section.     Limitation Score: 63%  Category: Mobility     Current : CL = least 60% but < 80% impaired, limited or restricted  Goal: CK = at least 40% but < 60% impaired, limited or restricted                  Treatment    Home Exercise Program as follows: see pt instructions for HEP. Seated flexion  Handouts  were given to the patient. Pt demo good understanding of the education provided. Vivienne demonstrated good return demonstration of activities.          - Patient received education regarding proper posture and body mechanics.  Patient was given top 10 tips handout which discusses posture seated, standing, lifting correctly, components of exercise, importance of nutrition and hydration, and importance of sleep.  - Hien roll tried, recommended, and purchase information was provided.     - Patient received a handout regarding anticipated muscular soreness following the isometric test and strategies for management were reviewed with patient including stretching, using ice and scheduled rest.         Pt was instructed in and performed the following:   Cardiovascular exercise and therapeutic exercise to improve posture, lumbar/cervical ROM, strength, and muscular endurance as follows:      Vivienne received therapeutic exercises to develop/improve posture, lumbar/cervical ROM, strength and muscular endurance for 60 minutes including the following exercises:     [facility] 11/13/2018   Visit Number 4   VAS Pain Rating 0   Time 10   Flexion in Lying 10   Flexion in Sitting 10   Lumbar Extension Seat Pad -   Femur Restraint -   Top Dead Center -   Counterweight -   Lumbar Flexion -   Lumbar Extension -   Lumbar Peak Torque -   Min Torque -   Test Percent Below Normative Data -   Lumbar Weight 36   Repetitions 20   Rating of Perceived Exertion 2   Ice - Sitting 10       NB: possibly No lap belt    Seated flexion: x 15  Standing flexion : x 10  Supine flexion: x 10 with ball  Standing extension x 10  Prone press ups x 10      NEUROMUSCULAR:   Taping R knee for pain and stability.    Assessment     Patient completed 20 reps at 36# today, with an RPE of 2. She had no concerts apart from her R knee hurting today due to weather. Increase lumbar weight to 10% at next visit.     Patient is making good progress towards  established goals.  Pt will continue to benefit from skilled outpatient physical therapy to address the deficits stated in the impairment chart, provide pt/family education and to maximize pt's level of independence in the home and community environment.       Pt's spiritual, cultural and educational needs considered and pt agreeable to plan of care and goals as stated below:     Medical necessity is demonstrated by the following IMPAIRMENTS/PROBLEMS:    Pt presents with the following impairments:      History  Co-morbidities and personal factors that may impact the plan of care Co-morbidities:   CAD, diabetes, HTN and OA, knee pain, obesity     Personal Factors:   coping style       moderate   Examination  Body Structures and Functions, activity limitations and participation restrictions that may impact the plan of care Body Regions:   back  lower extremities     Body Systems:    ROM  strength  gait     Participation Restrictions:   Walking, standing, house work, cooking showering     Activity limitations:   Learning and applying knowledge  no deficits     General Tasks and Commands  no deficits     Communication  no deficits     Mobility  lifting and carrying objects  walking     Self care  washing oneself (bathing, drying, washing hands)     Domestic Life  shopping  cooking  doing house work (cleaning house, washing dishes, laundry)     Interactions/Relationships  no deficits     Life Areas  no deficits     Community and Social Life  no deficits             moderate   Clinical Presentation stable and uncomplicated low   Decision Making/ Complexity Score: moderate         GOALS: Pt is in agreement with the following goals.     Short term goals:  6 weeks or 10 visits   1.  Pt will demonstrate increased lumbar ROM by at least 3 degrees from the initial ROM value with improvements noted in functional ROM and ability to perform ADLs  2.  Pt will demonstrate increased maximum isometric torque value by 5% when compared  to the initial value resulting in improved ability to perform bending, lifting, and carrying activities safely, confidently.     3.  Patient report a reduction in worst pain score by 1-2 points for improved tolerance during work and recreational activities  4.  Pt able to perform HEP correctly with minimal cueing or supervision for therapist        Long term goals: 13 weeks or 20 visits   1. Pt will demonstrate increased lumbar ROM by at least 6 degrees from initial ROM value, resulting in improved ability to perform functional fwd bending while standing and sitting.   2. Pt will demonstrate increased maximum isometric torque value by 10% when compared to the initial value resulting in improved ability to perform bending, lifting, and carrying activities safely, confidently.  3. Pt to demonstrate ability to independently control and reduce their pain through posture positioning and mechanical movements throughout a typical day.  4.  Patient will demonstrate improved overall function per FOTO Survey to CK = at least 40% but < 60% impaired, limited or restricted score or less.  5. Pt to begin a progressive walking program in order to increase her physical activity for ADLs and exercising.     Plan   Continue with established Plan of Care towards established PT goals.

## 2018-11-15 ENCOUNTER — CLINICAL SUPPORT (OUTPATIENT)
Dept: REHABILITATION | Facility: OTHER | Age: 68
End: 2018-11-15
Attending: PHYSICAL MEDICINE & REHABILITATION
Payer: MEDICARE

## 2018-11-15 DIAGNOSIS — M54.42 CHRONIC LEFT-SIDED LOW BACK PAIN WITH LEFT-SIDED SCIATICA: Primary | ICD-10-CM

## 2018-11-15 DIAGNOSIS — G89.29 CHRONIC LEFT-SIDED LOW BACK PAIN WITH LEFT-SIDED SCIATICA: Primary | ICD-10-CM

## 2018-11-15 PROCEDURE — 97110 THERAPEUTIC EXERCISES: CPT | Mod: HCNC

## 2018-11-15 NOTE — PROGRESS NOTES
[facility]      Name: Vivienne Jose  Clinic Number: [id number]    Date of Treatment: 11/15/2018     Diagnosis:   Encounter Diagnosis   Name Primary?    Chronic left-sided low back pain with left-sided sciatica Yes     Physician: Amelia Antunez, *       Precautions: L knee OA/pain, HTN, diabetes     Pattern of pain determined: 2     Evaluation Date: 10/22/2018  Authorization Period Expiration: 12/31/18  Plan of Care Expiration: 1/21/18  Reassessment Due: 11/22/18    Visit # / Visits authorized: 4/ 20 (NB: No lap belt on machine)  Medicare charges: (375)    Time In: 230  Time Out: 330  Total Billable Time: 60minutes     Subjective   Vivienne reports  She is experiencing R knee pain and LLB /LLE with walking.  Pt reports her  LLE pain increased when her R knee began hurting her creating gait deviations    Patient reports their pain to be 3/10 on a 0-10 scale with 0 being no pain and 10 being the worst pain imaginable.  Pain Location: L side Low back       Occupation:  Part time clerical work, sits at computer and scans documents   Leisure: computer,                      Pts goals:  To be able to walk further    Objective        Baseline Isometric Testing on Med X equipment: Testing administered by PT  Date of testing: 10/22/18  ROM 0-30 deg   Max Peak Torque 117    Min Peak Torque 25    Flex/Ext Ratio 4.68:1   % below normative data 18   Counter weight 241   femur 6   Seat pad 0               CMS Impairment/Limitation/Restriction for FOTO lumbar Survey     Therapist reviewed FOTO scores for Vivienne Jose on 10/22/2018.   FOTO documents entered into [employer] - see Media section.     Limitation Score: 63%  Category: Mobility     Current : CL = least 60% but < 80% impaired, limited or restricted  Goal: CK = at least 40% but < 60% impaired, limited or restricted                  Treatment    Home Exercise Program as follows: see pt instructions for HEP. Seated flexion  Handouts were  given to the patient. Pt demo good understanding of the education provided. Vivienne demonstrated good return demonstration of activities.        Vivienne received therapeutic exercises to develop/improve posture, lumbar/cervical ROM, strength and muscular endurance for 60 minutes including the following exercises:   [facility] 11/15/2018   Visit Number 5   VAS Pain Rating 3   Time 10   Flexion in Lying 10   Flexion in Sitting 10   Manual Therapy 10   Lumbar Extension Seat Pad -   Femur Restraint -   Top Dead Center -   Counterweight -   Lumbar Flexion -   Lumbar Extension -   Lumbar Peak Torque -   Min Torque -   Test Percent Below Normative Data -   Lumbar Weight 40   Repetitions 20   Rating of Perceived Exertion 3   Ice - Sitting 10       NB: possibly No lap belt    Seated flexion: x 15  Standing flexion : x 10  Supine flexion: x 10 with ball  Standing extension x 10  Prone press ups x 10      NEUROMUSCULAR:   Taping R knee for pain and stability.    Assessment   Pt tolerated treatment well and completed 20 reps at 5% increase today.  Pt ambulated into clinic with mod deviations secondary to pain.  Discussed FDN for L buttocks/LLB pain, which she reports previously worked when she had LBP..  FDN performed today, see note below.  Inc 5% next visit.  Patient is making good progress towards established goals.  Pt will continue to benefit from skilled outpatient physical therapy to address the deficits stated in the impairment chart, provide pt/family education and to maximize pt's level of independence in the home and community environment.       Pt's spiritual, cultural and educational needs considered and pt agreeable to plan of care and goals as stated below:     Medical necessity is demonstrated by the following IMPAIRMENTS/PROBLEMS:    Pt presents with the following impairments:      History  Co-morbidities and personal factors that may impact the plan of care Co-morbidities:   CAD, diabetes, HTN and OA,  knee pain, obesity     Personal Factors:   coping style       moderate   Examination  Body Structures and Functions, activity limitations and participation restrictions that may impact the plan of care Body Regions:   back  lower extremities     Body Systems:    ROM  strength  gait     Participation Restrictions:   Walking, standing, house work, cooking showering     Activity limitations:   Learning and applying knowledge  no deficits     General Tasks and Commands  no deficits     Communication  no deficits     Mobility  lifting and carrying objects  walking     Self care  washing oneself (bathing, drying, washing hands)     Domestic Life  shopping  cooking  doing house work (cleaning house, washing dishes, laundry)     Interactions/Relationships  no deficits     Life Areas  no deficits     Community and Social Life  no deficits             moderate   Clinical Presentation stable and uncomplicated low   Decision Making/ Complexity Score: moderate         GOALS: Pt is in agreement with the following goals.     Short term goals:  6 weeks or 10 visits   1.  Pt will demonstrate increased lumbar ROM by at least 3 degrees from the initial ROM value with improvements noted in functional ROM and ability to perform ADLs  2.  Pt will demonstrate increased maximum isometric torque value by 5% when compared to the initial value resulting in improved ability to perform bending, lifting, and carrying activities safely, confidently.     3.  Patient report a reduction in worst pain score by 1-2 points for improved tolerance during work and recreational activities  4.  Pt able to perform HEP correctly with minimal cueing or supervision for therapist        Long term goals: 13 weeks or 20 visits   1. Pt will demonstrate increased lumbar ROM by at least 6 degrees from initial ROM value, resulting in improved ability to perform functional fwd bending while standing and sitting.   2. Pt will demonstrate increased maximum isometric  torque value by 10% when compared to the initial value resulting in improved ability to perform bending, lifting, and carrying activities safely, confidently.  3. Pt to demonstrate ability to independently control and reduce their pain through posture positioning and mechanical movements throughout a typical day.  4.  Patient will demonstrate improved overall function per FOTO Survey to CK = at least 40% but < 60% impaired, limited or restricted score or less.  5. Pt to begin a progressive walking program in order to increase her physical activity for ADLs and exercising.     Plan   Continue with established Plan of Care towards established PT goals.     Dry Needling Daily Note     Patient ID: Vivienne Jose is a 68 y.o. female.  Diagnosis:   1. Chronic left-sided low back pain with left-sided sciatica       Date:  11/15/2018    Start Time:  320  Stop Time:  330      Subjective:     Pt reports: FDN has helped her previously  Pain Scale: Vivienne rates pain on a scale of 0-10 to be 3 currently.      Objective:     Pt signed written consent to dry needling Rx.  Pt gave verbal consent for DN.   Pt rec'd dry needling to LB/Left buttocks with 1.5 and 1 in needles with no adverse effects.    Homeostatic points:  1. Deep Radial  2. Greater Auricular  3. Spinal Accessory  4. Saphenous  5. Deep Fibular  6. Tibial  7. Greater Occipital  8. Suprascapular ( infraspinatus)  9. Lateral Antebrachial Cutaneous  10. Sural  11. Lateral Popliteal  12. Superficial Radial  13. Dorsal Scapular  14. Superior Cluneal  15. Posterior Cutaneous L 2  16. Inferior Gluteal  17. Lateral Pectoral  18. Ilitotibal  19. Infraorbital  20. Spinous process T7  21. Posterior cutaneous  T6  22. Posterior cutaneous L 5  23. Supraorbital  24. Common fibular    Paravertebral Points:  none    Symptomatic Points:   Left buttocks   6 needles    Assessment:     Patient demonstrated appropriate response to FDN. No adverse effects.    Patient Education/Response:      Education provided re: increasing dosage  Vivienne verbalized good understanding of education     Plans and Goals:     Monitor response to FDN. Continue with FDN in POC as tolerated.     Ariana Clifford, PT  11/15/2018

## 2018-11-16 ENCOUNTER — HOSPITAL ENCOUNTER (OUTPATIENT)
Dept: RADIOLOGY | Facility: HOSPITAL | Age: 68
Discharge: HOME | End: 2018-11-16
Attending: PODIATRIST
Payer: MEDICARE

## 2018-11-16 ENCOUNTER — LAB VISIT (OUTPATIENT)
Dept: LAB | Facility: HOSPITAL | Age: 68
End: 2018-11-16
Attending: INTERNAL MEDICINE
Payer: MEDICARE

## 2018-11-16 ENCOUNTER — OFFICE VISIT (OUTPATIENT)
Dept: INTERNAL MEDICINE | Facility: CLINIC | Age: 68
End: 2018-11-16
Payer: MEDICARE

## 2018-11-16 ENCOUNTER — OFFICE VISIT (OUTPATIENT)
Dept: PODIATRY | Facility: CLINIC | Age: 68
End: 2018-11-16
Payer: MEDICARE

## 2018-11-16 VITALS
OXYGEN SATURATION: 96 % | DIASTOLIC BLOOD PRESSURE: 68 MMHG | HEART RATE: 82 BPM | BODY MASS INDEX: 43.55 KG/M2 | WEIGHT: 238.13 LBS | SYSTOLIC BLOOD PRESSURE: 136 MMHG

## 2018-11-16 VITALS
SYSTOLIC BLOOD PRESSURE: 122 MMHG | BODY MASS INDEX: 43.98 KG/M2 | HEIGHT: 62 IN | DIASTOLIC BLOOD PRESSURE: 66 MMHG | WEIGHT: 239 LBS | HEART RATE: 72 BPM

## 2018-11-16 DIAGNOSIS — M79.675 GREAT TOE PAIN, LEFT: ICD-10-CM

## 2018-11-16 DIAGNOSIS — E11.22 TYPE 2 DIABETES MELLITUS WITH STAGE 3 CHRONIC KIDNEY DISEASE, WITH LONG-TERM CURRENT USE OF INSULIN: Primary | ICD-10-CM

## 2018-11-16 DIAGNOSIS — R31.9 HEMATURIA, UNSPECIFIED TYPE: ICD-10-CM

## 2018-11-16 DIAGNOSIS — N18.30 TYPE 2 DIABETES MELLITUS WITH STAGE 3 CHRONIC KIDNEY DISEASE, WITH LONG-TERM CURRENT USE OF INSULIN: Primary | ICD-10-CM

## 2018-11-16 DIAGNOSIS — M89.8X7 EXOSTOSIS OF TOE: ICD-10-CM

## 2018-11-16 DIAGNOSIS — E66.01 MORBID OBESITY WITH BODY MASS INDEX (BMI) OF 40.0 TO 44.9 IN ADULT: ICD-10-CM

## 2018-11-16 DIAGNOSIS — Z00.00 PREVENTATIVE HEALTH CARE: ICD-10-CM

## 2018-11-16 DIAGNOSIS — G47.33 OSA (OBSTRUCTIVE SLEEP APNEA): ICD-10-CM

## 2018-11-16 DIAGNOSIS — I15.2 HYPERTENSION ASSOCIATED WITH DIABETES: ICD-10-CM

## 2018-11-16 DIAGNOSIS — Z23 NEEDS FLU SHOT: ICD-10-CM

## 2018-11-16 DIAGNOSIS — E87.6 HYPOKALEMIA: ICD-10-CM

## 2018-11-16 DIAGNOSIS — E11.59 HYPERTENSION ASSOCIATED WITH DIABETES: ICD-10-CM

## 2018-11-16 DIAGNOSIS — E11.9 CONTROLLED TYPE 2 DIABETES MELLITUS WITHOUT COMPLICATION, WITH LONG-TERM CURRENT USE OF INSULIN: Primary | ICD-10-CM

## 2018-11-16 DIAGNOSIS — E78.5 HYPERLIPIDEMIA, UNSPECIFIED HYPERLIPIDEMIA TYPE: ICD-10-CM

## 2018-11-16 DIAGNOSIS — Z79.4 TYPE 2 DIABETES MELLITUS WITH STAGE 3 CHRONIC KIDNEY DISEASE, WITH LONG-TERM CURRENT USE OF INSULIN: Primary | ICD-10-CM

## 2018-11-16 DIAGNOSIS — Z79.4 CONTROLLED TYPE 2 DIABETES MELLITUS WITHOUT COMPLICATION, WITH LONG-TERM CURRENT USE OF INSULIN: Primary | ICD-10-CM

## 2018-11-16 DIAGNOSIS — E03.9 HYPOTHYROIDISM, UNSPECIFIED TYPE: ICD-10-CM

## 2018-11-16 LAB
BILIRUB UR QL STRIP: NEGATIVE
CLARITY UR REFRACT.AUTO: CLEAR
COLOR UR AUTO: YELLOW
GLUCOSE UR QL STRIP: ABNORMAL
HGB UR QL STRIP: NEGATIVE
KETONES UR QL STRIP: NEGATIVE
LEUKOCYTE ESTERASE UR QL STRIP: NEGATIVE
MICROSCOPIC COMMENT: NORMAL
NITRITE UR QL STRIP: NEGATIVE
PH UR STRIP: 7 [PH] (ref 5–8)
PROT UR QL STRIP: NEGATIVE
RBC #/AREA URNS AUTO: 0 /HPF (ref 0–4)
SP GR UR STRIP: 1.02 (ref 1–1.03)
SQUAMOUS #/AREA URNS AUTO: 4 /HPF
URN SPEC COLLECT METH UR: ABNORMAL
WBC #/AREA URNS AUTO: 0 /HPF (ref 0–5)

## 2018-11-16 PROCEDURE — 99214 OFFICE O/P EST MOD 30 MIN: CPT | Mod: 25,HCNC,S$GLB, | Performed by: INTERNAL MEDICINE

## 2018-11-16 PROCEDURE — 73630 X-RAY EXAM OF FOOT: CPT | Mod: TC,HCNC,PN,LT

## 2018-11-16 PROCEDURE — 81001 URINALYSIS AUTO W/SCOPE: CPT | Mod: HCNC

## 2018-11-16 PROCEDURE — 90662 IIV NO PRSV INCREASED AG IM: CPT | Mod: HCNC,S$GLB,, | Performed by: INTERNAL MEDICINE

## 2018-11-16 PROCEDURE — 3045F PR MOST RECENT HEMOGLOBIN A1C LEVEL 7.0-9.0%: CPT | Mod: CPTII,HCNC,S$GLB, | Performed by: INTERNAL MEDICINE

## 2018-11-16 PROCEDURE — 3075F SYST BP GE 130 - 139MM HG: CPT | Mod: CPTII,HCNC,S$GLB, | Performed by: INTERNAL MEDICINE

## 2018-11-16 PROCEDURE — 1101F PT FALLS ASSESS-DOCD LE1/YR: CPT | Mod: CPTII,HCNC,S$GLB, | Performed by: INTERNAL MEDICINE

## 2018-11-16 PROCEDURE — G0008 ADMIN INFLUENZA VIRUS VAC: HCPCS | Mod: HCNC,S$GLB,, | Performed by: INTERNAL MEDICINE

## 2018-11-16 PROCEDURE — 99999 PR PBB SHADOW E&M-EST. PATIENT-LVL V: CPT | Mod: PBBFAC,HCNC,, | Performed by: INTERNAL MEDICINE

## 2018-11-16 PROCEDURE — 3078F DIAST BP <80 MM HG: CPT | Mod: CPTII,HCNC,S$GLB, | Performed by: INTERNAL MEDICINE

## 2018-11-16 PROCEDURE — 3078F DIAST BP <80 MM HG: CPT | Mod: CPTII,HCNC,S$GLB, | Performed by: PODIATRIST

## 2018-11-16 PROCEDURE — 99213 OFFICE O/P EST LOW 20 MIN: CPT | Mod: HCNC,S$GLB,, | Performed by: PODIATRIST

## 2018-11-16 PROCEDURE — 1101F PT FALLS ASSESS-DOCD LE1/YR: CPT | Mod: CPTII,HCNC,S$GLB, | Performed by: PODIATRIST

## 2018-11-16 PROCEDURE — 99999 PR PBB SHADOW E&M-EST. PATIENT-LVL IV: CPT | Mod: PBBFAC,HCNC,, | Performed by: PODIATRIST

## 2018-11-16 PROCEDURE — 3074F SYST BP LT 130 MM HG: CPT | Mod: CPTII,HCNC,S$GLB, | Performed by: PODIATRIST

## 2018-11-16 PROCEDURE — 3045F PR MOST RECENT HEMOGLOBIN A1C LEVEL 7.0-9.0%: CPT | Mod: CPTII,HCNC,S$GLB, | Performed by: PODIATRIST

## 2018-11-16 PROCEDURE — 73630 X-RAY EXAM OF FOOT: CPT | Mod: 26,HCNC,LT, | Performed by: RADIOLOGY

## 2018-11-16 RX ORDER — CHLORTHALIDONE 25 MG/1
25 TABLET ORAL DAILY
Qty: 90 TABLET | Refills: 1 | Status: SHIPPED | OUTPATIENT
Start: 2018-11-16 | End: 2019-05-11 | Stop reason: SDUPTHER

## 2018-11-16 RX ORDER — POTASSIUM CHLORIDE 750 MG/1
TABLET, EXTENDED RELEASE ORAL
Qty: 145 TABLET | Refills: 1 | Status: SHIPPED | OUTPATIENT
Start: 2018-11-16 | End: 2019-05-11 | Stop reason: SDUPTHER

## 2018-11-16 NOTE — PROGRESS NOTES
Answers for HPI/ROS submitted by the patient on 11/14/2018   activity change: No  unexpected weight change: No  rhinorrhea: No  trouble swallowing: No  visual disturbance: No  chest tightness: No  polyuria: No  difficulty urinating: No  menstrual problem: No  joint swelling: Yes  arthralgias: Yes  confusion: No  dysphoric mood: No  Pt. ID: Vivienne Jose is a 68 y.o. female      Chief complaint:   Chief Complaint   Patient presents with    Follow-up     4 months       HPI: Pt. Here for f/u for DM and HTN; trace blood was noted in urine and she is seeing urogyn; HGBA1C was 7.7; she states her diet has been poor recently and will attempt stricter ADA diet; she is using CPAP nightly; weight is elevated; she would like a flu shot; of note, pt. Is seeing ortho for chronic knee pain    Review of Systems   HENT: Negative for hearing loss.    Eyes: Negative for discharge.   Respiratory: Negative for wheezing.    Cardiovascular: Negative for chest pain and palpitations.   Gastrointestinal: Negative for blood in stool, constipation, diarrhea and vomiting.   Genitourinary: Positive for hematuria. Negative for dysuria.   Musculoskeletal: Negative for neck pain.   Neurological: Negative for headaches.   Endo/Heme/Allergies: Negative for polydipsia.         Objective:    Physical Exam   Constitutional: She is oriented to person, place, and time.   Morbid obesity    Eyes: EOM are normal.   Neck: Normal range of motion.   Cardiovascular: Normal rate, regular rhythm and normal heart sounds.   Pulmonary/Chest: Effort normal and breath sounds normal.   Abdominal: Soft. There is no tenderness. There is no rebound and no guarding.   Musculoskeletal: Normal range of motion.   Neurological: She is alert and oriented to person, place, and time.   Skin: No rash noted.   Vitals reviewed.        Health Maintenance   Topic Date Due    Foot Exam  04/02/2019    Eye Exam  05/07/2019    Hemoglobin A1c  05/12/2019    Lipid Panel  07/27/2019     Mammogram  08/04/2019    Pneumococcal (65+) (2 of 2 - PPSV23) 04/28/2020    DEXA SCAN  01/05/2021    Colonoscopy  09/13/2022    TETANUS VACCINE  06/19/2024    Hepatitis C Screening  Completed    Zoster Vaccine  Completed    Influenza Vaccine  Completed         Assessment:     1. Type 2 diabetes mellitus with stage 3 chronic kidney disease, with long-term current use of insulin Well controlled   2. Hypertension associated with diabetes Well controlled   3. Hyperlipidemia, unspecified hyperlipidemia type Active   4. Hypothyroidism, unspecified type Active   5. Hypokalemia Well controlled   6. BAM (obstructive sleep apnea) Well controlled   7. Hematuria, unspecified type Active   8. Preventative health care Active   9. Morbid obesity with body mass index (BMI) of 40.0 to 44.9 in adult Sub-optimally controlled   10. Needs flu shot Active         Plan: Type 2 diabetes mellitus with stage 3 chronic kidney disease, with long-term current use of insulin  Comments:  continue current regimen and encouraged ADA diet; goal HGBA1C based on age is < 8  Orders:  -     CBC auto differential; Future; Expected date: 04/16/2019  -     Comprehensive metabolic panel; Future; Expected date: 04/16/2019  -     Hemoglobin A1c; Future; Expected date: 04/16/2019  -     Urinalysis; Future; Expected date: 04/16/2019  -     Microalbumin/creatinine urine ratio; Future; Expected date: 04/16/2019    Hypertension associated with diabetes  Comments:  continue current regimen and encouraged low Na diet and weight loss  Orders:  -     CBC auto differential; Future; Expected date: 04/16/2019  -     Comprehensive metabolic panel; Future; Expected date: 04/16/2019  -     Urinalysis; Future; Expected date: 04/16/2019  -     chlorthalidone (HYGROTEN) 25 MG Tab; Take 1 tablet (25 mg total) by mouth once daily.  Dispense: 90 tablet; Refill: 1    Hyperlipidemia, unspecified hyperlipidemia type  -     Lipid panel; Future; Expected date:  04/16/2019    Hypothyroidism, unspecified type  Comments:  continue current regimen   Orders:  -     TSH; Future; Expected date: 04/16/2019    Hypokalemia  Comments:  continue potassium   Orders:  -     potassium chloride SA (K-DUR,KLOR-CON) 10 MEQ tablet; Take 10 meq PO QD except on Mondays, Wednesdays and Fridays, on which she should take 2 tabs (20 meq) QD  Dispense: 145 tablet; Refill: 1    BAM (obstructive sleep apnea)  Comments:  continue CPAP     Hematuria, unspecified type  Comments:  repeat U/A for verification and f/u urogyn who is evaluating   Orders:  -     Urinalysis; Future; Expected date: 11/16/2018    Preventative health care  -     CBC auto differential; Future; Expected date: 04/16/2019  -     Comprehensive metabolic panel; Future; Expected date: 04/16/2019  -     Lipid panel; Future; Expected date: 04/16/2019  -     Urinalysis; Future; Expected date: 04/16/2019  -     TSH; Future; Expected date: 04/16/2019    Morbid obesity with body mass index (BMI) of 40.0 to 44.9 in adult  Comments:  encouraged diet and explained risks     Needs flu shot  -     Influenza - High Dose (65+) (PF) (IM)        Problem List Items Addressed This Visit        Cardiac/Vascular    Hyperlipidemia    Relevant Orders    Lipid panel    Hypertension associated with diabetes    Relevant Medications    chlorthalidone (HYGROTEN) 25 MG Tab    Other Relevant Orders    CBC auto differential    Comprehensive metabolic panel    Urinalysis       Renal/    Hypokalemia    Relevant Medications    potassium chloride SA (K-DUR,KLOR-CON) 10 MEQ tablet    Hematuria    Overview     repeat U/A for verification and f/u urogyn who is evaluating          Relevant Orders    Urinalysis       ID    Needs flu shot    Relevant Orders    Influenza - High Dose (65+) (PF) (IM)       Endocrine    Type 2 diabetes mellitus with stage 3 chronic kidney disease, with long-term current use of insulin - Primary    Relevant Orders    CBC auto differential     Comprehensive metabolic panel    Hemoglobin A1c    Urinalysis    Microalbumin/creatinine urine ratio    Morbid obesity with body mass index (BMI) of 40.0 to 44.9 in adult    Hypothyroidism    Relevant Orders    TSH       Other    BAM (obstructive sleep apnea)    Preventative health care    Relevant Orders    CBC auto differential    Comprehensive metabolic panel    Lipid panel    Urinalysis    TSH

## 2018-11-18 NOTE — PROGRESS NOTES
Subjective:      Patient ID: Vivienne Jose is a 68 y.o. female.    Chief Complaint: Toe Pain (Left hallux)    Vivienne is a 68 y.o. female who presents to the clinic for evaluation and treatment of high risk feet.  Vivienne has a past medical history of Allergy, Angio-edema, Arthritis, Cataract, Cerebrovascular malformation, Colon polyps, Coronary artery disease, Diabetes mellitus, type 2, Diabetic peripheral neuropathy, GERD (gastroesophageal reflux disease), Herpes infection, Hyperlipidemia, Hypertension, Hypothyroidism, Kidney stones, Nausea & vomiting (11/23/2015), Obesity, morbid, Ovarian cyst, Pyelonephritis, chronic, Seizure disorder, focal motor, Sleep apnea, Type II or unspecified type diabetes mellitus with neurological manifestations, uncontrolled(250.62), Urinary tract infection, Urticaria, and Vaginal infection.  This patient has documented high risk feet requiring routine maintenance secondary to diabetes mellitis and those secondary complications of diabetes, as mentioned.  History of left big toe total nail P&A matrixectomy. Complains of aching pain to the tip of the left great toe that is not relieved by wearing open toed shoes. Denies trauma.       PCP: Jay Jay Felton MD    Date Last Seen by PCP: 11/16/18  Current shoe gear:  sandals    Hemoglobin A1C   Date Value Ref Range Status   11/12/2018 7.7 (H) 4.0 - 5.6 % Final     Comment:     ADA Screening Guidelines:  5.7-6.4%  Consistent with prediabetes  >or=6.5%  Consistent with diabetes  High levels of fetal hemoglobin interfere with the HbA1C  assay. Heterozygous hemoglobin variants (HbS, HgC, etc)do  not significantly interfere with this assay.   However, presence of multiple variants may affect accuracy.     07/27/2018 7.0 (H) 4.0 - 5.6 % Final     Comment:     ADA Screening Guidelines:  5.7-6.4%  Consistent with prediabetes  >or=6.5%  Consistent with diabetes  High levels of fetal hemoglobin interfere with the HbA1C  assay. Heterozygous hemoglobin  "variants (HbS, HgC, etc)do  not significantly interfere with this assay.   However, presence of multiple variants may affect accuracy.     07/27/2018 7.0 (H) 4.0 - 5.6 % Final     Comment:     ADA Screening Guidelines:  5.7-6.4%  Consistent with prediabetes  >or=6.5%  Consistent with diabetes  High levels of fetal hemoglobin interfere with the HbA1C  assay. Heterozygous hemoglobin variants (HbS, HgC, etc)do  not significantly interfere with this assay.   However, presence of multiple variants may affect accuracy.       Vitals:    11/16/18 0943   BP: 122/66   Pulse: 72   Weight: 108.4 kg (239 lb)   Height: 5' 2" (1.575 m)   PainSc: 0-No pain      Past Medical History:   Diagnosis Date    Allergy     Angio-edema     Arthritis     Cataract     Cerebrovascular malformation     Colon polyps     Coronary artery disease     Diabetes mellitus, type 2     Diabetic peripheral neuropathy     GERD (gastroesophageal reflux disease)     Herpes infection     Hyperlipidemia     Hypertension     Hypothyroidism     Kidney stones     Nausea & vomiting 11/23/2015    Obesity, morbid     Ovarian cyst     Pyelonephritis, chronic     Seizure disorder, focal motor     Sleep apnea     Type II or unspecified type diabetes mellitus with neurological manifestations, uncontrolled(250.62)     Urinary tract infection     Urticaria     Vaginal infection        Past Surgical History:   Procedure Laterality Date    CARPAL TUNNEL RELEASE Right 2014    COLONOSCOPY      COLONOSCOPY N/A 9/13/2017    Procedure: COLONOSCOPY Golytely;  Surgeon: Gisela Wall MD;  Location: [facility];  Service: Endoscopy;  Laterality: N/A;    COLONOSCOPY N/A 8/29/2014    [facility] at [facility] ENDO    COLONOSCOPY Golytely N/A 9/13/2017    [facility] at [facility]    CYST REMOVAL      skin; multiples    CYSTOSCOPY WITH STENT PLACEMENT Left 12/28/2016    Performed by Greg Lyons, " MD at [facility] OR    ENDOSCOPIC-RELEASE-CARPAL TUNNEL right Right 7/8/2014    [facility] Riley Rivera [facility]    EXTRACORPOREAL SHOCK WAVE LITHOTRIPSY  2002    EXTRACTION-STONE-URETEROSCOPY Left 12/28/2016    Performed [facility] at [facility] OR    HYSTERECTOMY  1984    KIDNEY STONE SURGERY      LITHOTRIPSY-LASER Left 12/28/2016    [facility] at [facility] OR    RELEASE-FINGER-TRIGGER right long finger Right 7/8/2014    Per[facility] by Riley Rivera [facility] at [facility]    THYROIDECTOMY  1977         TONSILLECTOMY, ADENOIDECTOMY      TRIGGER FINGER RELEASE      TUBAL LIGATION         Family History   Problem Relation Age of Onset    Cancer Father     Arthritis Father     Gout Father     Allergies Son     Allergic rhinitis Son     Skin cancer Mother     Macular degeneration Mother     Dementia Mother     Hypertension Mother     No Known Problems Daughter     Diabetes Daughter     No Known Problems Daughter     Glaucoma Maternal Aunt         Great Maternal Aunt    Leukemia Maternal Uncle     Amblyopia Neg Hx     Cataracts Neg Hx     Retinal detachment Neg Hx     Strabismus Neg Hx     Stroke Neg Hx     Thyroid disease Neg Hx     Kidney disease Neg Hx     Angioedema Neg Hx     Asthma Neg Hx     Atopy Neg Hx     Eczema Neg Hx     Immunodeficiency Neg Hx     Rhinitis Neg Hx     Urticaria Neg Hx        Social History     Socioeconomic History    Marital status:      Spouse name: Not on file    Number of children: Not on file    Years of education: Not on file    Highest education level: Not on file   Social Needs    Financial resource strain: Not on file    Food insecurity - worry: Not on file    Food insecurity - inability: Not on file    Transportation needs - medical: Not on file    Transportation needs - non-medical: Not on file   Occupational History    Occupation: retired     Employer: [employer]   Tobacco  Use    Smoking status: Never Smoker    Smokeless tobacco: Never Used   Substance and Sexual Activity    Alcohol use: No     Alcohol/week: 0.0 oz    Drug use: No    Sexual activity: Yes     Partners: Male   Other Topics Concern    Are you pregnant or think you may be? Not Asked    Breast-feeding Not Asked   Social History Narrative    Not on file       Current Outpatient Medications   Medication Sig Dispense Refill    amLODIPine (NORVASC) 5 MG tablet Take 1 tablet (5 mg total) by mouth once daily. 90 tablet 3    ascorbic acid, vitamin C, (VITAMIN C) 500 mg Chew       aspirin (ECOTRIN) 81 MG EC tablet Take 81 mg by mouth once daily.      azelastine (ASTELIN) 137 mcg (0.1 %) nasal spray 2 sprays (274 mcg total) by Nasal route 2 (two) times daily. 90 mL 4    biotin 10,000 mcg Cap Take by mouth.      blood sugar diagnostic (TRUE METRIX GLUCOSE TEST STRIP) Strp TEST TWO TIMES DAILY 200 strip 6    blood-glucose meter Misc [employer] True Metrix Air meter 1 each 0    calcium carbonate-vitamin D3 600 mg(1,500mg) -100 unit Cap Take 1 tablet by mouth once daily.      econazole nitrate 1 % cream Mix with hydrocortisone cream 1% 30 grams use bid prn 60 g 3    fluticasone (FLONASE) 50 mcg/actuation nasal spray 2 sprays (100 mcg total) by Each Nare route once daily. 48 g 4    gabapentin (NEURONTIN) 300 MG capsule Take 2 capsules (600 mg total) by mouth every evening. 180 capsule 1    glucagon (human recombinant) inj 1mg/mL kit Inject 1 mL (1 mg total) into the muscle as needed. 1 kit 6    insulin detemir U-100 (LEVEMIR FLEXTOUCH U-100 INSULN) 100 unit/mL (3 mL) SubQ InPn pen Inject 16 Units into the skin every evening. 1 Box 6    ketotifen (ZADITOR) 0.025 % (0.035 %) ophthalmic solution Place 1-2 drops into both eyes once daily.      L.acid-B.bifidum-B.animal-FOS (PROBIOTIC COMPLEX) 25 billion cell -100 mg Cap Take 1 capsule by mouth once daily.      lancets (TRUEPLUS LANCETS) 28 gauge Misc Inject 1 lancet into  "the skin 2 (two) times daily before meals. 200 each 6    levothyroxine (SYNTHROID) 75 MCG tablet TAKE 1 TABLET EVERY DAY 90 tablet 3    liraglutide 0.6 mg/0.1 mL, 18 mg/3 mL, subq PNIJ (VICTOZA 3-TAWANA) 0.6 mg/0.1 mL (18 mg/3 mL) PnIj INJECT 1.8 MG INTO THE SKIN ONCE DAILY. 27 mL 11    loratadine (CLARITIN) 10 mg tablet Take 10 mg by mouth once daily.      LORazepam (ATIVAN) 0.5 MG tablet TK 1 T PO 1 HOUR B MRI  0    lovastatin (MEVACOR) 40 MG tablet TAKE 1 TABLET NIGHTLY (SUBSTITUTED FOR MEVACOR) 90 tablet 1    magnesium oxide-Mg AA chelate (MAGNESIUM, AMINO ACID CHELATE,) 133 mg Tab Take by mouth as directed.       metFORMIN (GLUCOPHAGE) 1000 MG tablet Take 1 tablet (1,000 mg total) by mouth 2 (two) times daily with meals. 180 tablet 3    omeprazole (PRILOSEC) 20 MG capsule Take 1 capsule (20 mg total) by mouth daily as needed. 90 capsule 1    pen needle, diabetic (BD ULTRA-FINE EDUARDO PEN NEEDLES) 32 gauge x 5/32" Ndle USE WITH VICTOZA AND LEVEMIR (2 INJECTIONS EVERY DAY) 100 each 3    PREMARIN vaginal cream Place 0.5 g vaginally 3 (three) times a week. 30 g 3    repaglinide (PRANDIN) 2 MG tablet Take 1 tablet (2 mg total) by mouth 3 (three) times daily before meals. 270 tablet 3    valACYclovir (VALTREX) 500 MG tablet Take 1 tablet (500 mg total) by mouth once daily. 90 tablet 1    valsartan (DIOVAN) 160 MG tablet Take 1 tablet (160 mg total) by mouth 2 (two) times daily. 180 tablet 1    chlorthalidone (HYGROTEN) 25 MG Tab Take 1 tablet (25 mg total) by mouth once daily. 90 tablet 1    diclofenac sodium (VOLTAREN) 1 % Gel Apply 2 g topically 4 (four) times daily. 3 Tube 3    potassium chloride SA (K-DUR,KLOR-CON) 10 MEQ tablet Take 10 meq PO QD except on Mondays, Wednesdays and Fridays, on which she should take 2 tabs (20 meq)  tablet 1     Current Facility-Administered Medications   Medication Dose Route Frequency Provider Last Rate Last Dose    Allergy Mix   Subcutaneous 1 time in Clinic/HOD " Ailin Mills MD           Allergies   Allergen Reactions    Sulfa (Sulfonamide Antibiotics) Nausea Only    Ciprofloxacin     Januvia [Sitagliptin]      abd pain    Lisinopril     Lotensin [Benazepril]     Nexium [Esomeprazole Magnesium] Other (See Comments)     Gas         Review of Systems   Constitution: Negative for chills, fever, weakness and malaise/fatigue.   Cardiovascular: Negative for chest pain, claudication and leg swelling.   Respiratory: Negative for cough and shortness of breath.    Skin: Positive for nail changes. Negative for color change, dry skin and itching.   Musculoskeletal: Positive for back pain. Negative for joint pain, muscle cramps and muscle weakness.   Gastrointestinal: Negative for nausea and vomiting.   Neurological: Positive for numbness and paresthesias.   Psychiatric/Behavioral: Negative for altered mental status.           Objective:      Physical Exam   Constitutional: She is oriented to person, place, and time. She appears well-nourished. No distress.   Cardiovascular: Intact distal pulses.   Pulses:       Dorsalis pedis pulses are 2+ on the right side, and 2+ on the left side.        Posterior tibial pulses are 2+ on the right side, and 2+ on the left side.   CFT< 3 secs all toes bilateral foot, skin temp warm bilateral foot, no digital hair growth bilateral foot, no lower extremity edema bilateral.       Musculoskeletal:        Right foot: There is no deformity.        Left foot: There is no deformity.   Mild pain plantar 2 MTP, - Lauchman test bilateral foot. Gastrocnemius equinus bilateral. Rectus foot type with adductovarus rotation of fifth toe bilateral. No pain with ROM or MMT bilateral foot.    Palpable enlargement of the distal left hallux with mild localized pain, no discoloration or edema. Right hallux distal tip is also enlarged to lesser extent with superior rotation.   Feet:   Right Foot:   Protective Sensation: 10 sites tested. 10 sites sensed.   Skin  Integrity: Negative for ulcer, blister, skin breakdown, erythema, warmth, callus or dry skin.   Left Foot:   Protective Sensation: 10 sites tested. 10 sites sensed.   Skin Integrity: Negative for ulcer, blister, skin breakdown, erythema, warmth, callus or dry skin.   Neurological: She is alert and oriented to person, place, and time. She has normal strength. No sensory deficit.   Vibratory sensation intact bilateral foot.       Skin: Skin is warm, dry and intact. Capillary refill takes less than 2 seconds. No ecchymosis and no rash noted. She is not diaphoretic. No cyanosis or erythema. No pallor. Nails show no clubbing.   Bilateral hallux nail bed with healthy appearing skin. Small nail spicule along the medial left hallux nail bed, no pain, no soi.    Remaining nails 2-5 b/l normotrophic and trimmed.     No open lesions or macerations bilateral lower extremity.                     Assessment:       Encounter Diagnoses   Name Primary?    Controlled type 2 diabetes mellitus without complication, with long-term current use of insulin Yes    Exostosis of toe - Left Foot     Great toe pain, left          Plan:       Vivienne was seen today for toe pain.    Diagnoses and all orders for this visit:    Controlled type 2 diabetes mellitus without complication, with long-term current use of insulin    Exostosis of toe - Left Foot  -     X-Ray Foot Complete Left; Future    Great toe pain, left  -     X-Ray Foot Complete Left; Future      I counseled the patient on her conditions, their implications and medical management.    Shoe inspection. Diabetic Foot Education. Patient reminded of the importance of good nutrition and blood sugar control to help prevent podiatric complications of diabetes. Patient instructed on proper foot hygeine. We discussed wearing proper shoe gear, daily foot inspections, never walking without protective shoe gear, never putting sharp instruments to feet, routine podiatric nail visits every 2-3  months. Discussed shoes with adequate toe box space to prevent excessive pressure and rubbing along the toenails.     X-ray to assess for underlying bone lesion that is contributing to the palpable enlargement of the toe and localized pain. Discussed conservative care such as shoe gear modifications, compound pain creams vs surgical intervention in detail.    RTC prn as discussed.

## 2018-11-19 ENCOUNTER — CLINICAL SUPPORT (OUTPATIENT)
Dept: REHABILITATION | Facility: OTHER | Age: 68
End: 2018-11-19
Attending: PHYSICAL MEDICINE & REHABILITATION
Payer: MEDICARE

## 2018-11-19 DIAGNOSIS — M54.42 CHRONIC LEFT-SIDED LOW BACK PAIN WITH LEFT-SIDED SCIATICA: Primary | ICD-10-CM

## 2018-11-19 DIAGNOSIS — G89.29 CHRONIC LEFT-SIDED LOW BACK PAIN WITH LEFT-SIDED SCIATICA: Primary | ICD-10-CM

## 2018-11-19 PROCEDURE — 97110 THERAPEUTIC EXERCISES: CPT | Mod: HCNC

## 2018-11-19 NOTE — PROGRESS NOTES
"[facility]      Name: Vivienne Jose  Clinic Number: [id number]    Date of Treatment: 11/19/2018     Diagnosis:   Encounter Diagnosis   Name Primary?    Chronic left-sided low back pain with left-sided sciatica Yes     Physician: Amelia Antunez, *       Precautions: L knee OA/pain, HTN, diabetes     Pattern of pain determined: 2     Evaluation Date: 10/22/2018  Authorization Period Expiration: 12/31/18  Plan of Care Expiration: 1/21/18  Reassessment Due: 11/22/18     Visit # / Visits authorized: 7/ 20 (NB: No lap belt on machine)  (Re-assessment next visit)  Medicare charges: ([phone])    Time In: 1450  Time Out: 1550  Total Billable Time: 60 minutes     Subjective   Vivienne arrives to PT today and reports feeling so much relief from the Dry needling completed at last visit. She was able to complete most of her daily activities and walking w/o exacerbating her symptoms. She rates the "aching" at her low back at a 2/10 today.     Patient reports their pain to be 2/10 on a 0-10 scale with 0 being no pain and 10 being the worst pain imaginable.  Pain Location: L side Low back       Occupation:  Part time clerical work, sits at computer and scans documents   Leisure: computer,                      Pts goals:  To be able to walk further    Objective        Baseline Isometric Testing on Med X equipment: Testing administered by PT  Date of testing: 10/22/18  ROM 0-30 deg   Max Peak Torque 117    Min Peak Torque 25    Flex/Ext Ratio 4.68:1   % below normative data 18   Counter weight 241   femur 6   Seat pad 0               CMS Impairment/Limitation/Restriction for FOTO lumbar Survey     Therapist reviewed FOTO scores for Vivienne Jose on 10/22/2018.   FOTO documents entered into [employer] - see Media section.     Limitation Score: 63%  Category: Mobility     Current : CL = least 60% but < 80% impaired, limited or restricted  Goal: CK = at least 40% but < 60% impaired, limited or " restricted                  Treatment      Home Exercise Program as follows: see pt instructions for HEP. Seated flexion  Handouts were given to the patient. Pt demo good understanding of the education provided. Vivienne demonstrated good return demonstration of activities.        Vivienne received therapeutic exercises to develop/improve posture, lumbar/cervical ROM, strength and muscular endurance for 60 minutes including the following exercises:     [facility] 11/19/2018   Visit Number 7   VAS Pain Rating 2   Time 10   Flexion in Lying 10   Flexion in Sitting 10   Manual Therapy -   Lumbar Extension Seat Pad -   Femur Restraint -   Top Dead Center -   Counterweight -   Lumbar Flexion -   Lumbar Extension -   Lumbar Peak Torque -   Min Torque -   Test Percent Below Normative Data -   Lumbar Weight 42   Repetitions 21   Rating of Perceived Exertion 2   Ice - Sitting 10       NB: possibly No lap belt    Seated flexion: x 15  Standing flexion : x 10  Supine flexion: x 10 with ball  Standing extension x 10  Prone press ups x 10      NEUROMUSCULAR:   Taping R knee for pain and stability.    Assessment     Patient was provided with a 5% Lumbar wt increase. Patient had dry needling completed at last session and felt very well she reported. She complete 21 reps at 42, with an RPE of 2. She may benefit from a 5-10% weight increase at next session.     Patient is making good progress towards established goals.  Pt will continue to benefit from skilled outpatient physical therapy to address the deficits stated in the impairment chart, provide pt/family education and to maximize pt's level of independence in the home and community environment.       Pt's spiritual, cultural and educational needs considered and pt agreeable to plan of care and goals as stated below:     Medical necessity is demonstrated by the following IMPAIRMENTS/PROBLEMS:    Pt presents with the following impairments:      History  Co-morbidities and  personal factors that may impact the plan of care Co-morbidities:   CAD, diabetes, HTN and OA, knee pain, obesity     Personal Factors:   coping style       moderate   Examination  Body Structures and Functions, activity limitations and participation restrictions that may impact the plan of care Body Regions:   back  lower extremities     Body Systems:    ROM  strength  gait     Participation Restrictions:   Walking, standing, house work, cooking showering     Activity limitations:   Learning and applying knowledge  no deficits     General Tasks and Commands  no deficits     Communication  no deficits     Mobility  lifting and carrying objects  walking     Self care  washing oneself (bathing, drying, washing hands)     Domestic Life  shopping  cooking  doing house work (cleaning house, washing dishes, laundry)     Interactions/Relationships  no deficits     Life Areas  no deficits     Community and Social Life  no deficits             moderate   Clinical Presentation stable and uncomplicated low   Decision Making/ Complexity Score: moderate         GOALS: Pt is in agreement with the following goals.     Short term goals:  6 weeks or 10 visits   1.  Pt will demonstrate increased lumbar ROM by at least 3 degrees from the initial ROM value with improvements noted in functional ROM and ability to perform ADLs  2.  Pt will demonstrate increased maximum isometric torque value by 5% when compared to the initial value resulting in improved ability to perform bending, lifting, and carrying activities safely, confidently.     3.  Patient report a reduction in worst pain score by 1-2 points for improved tolerance during work and recreational activities  4.  Pt able to perform HEP correctly with minimal cueing or supervision for therapist        Long term goals: 13 weeks or 20 visits   1. Pt will demonstrate increased lumbar ROM by at least 6 degrees from initial ROM value, resulting in improved ability to perform functional  fwd bending while standing and sitting.   2. Pt will demonstrate increased maximum isometric torque value by 10% when compared to the initial value resulting in improved ability to perform bending, lifting, and carrying activities safely, confidently.  3. Pt to demonstrate ability to independently control and reduce their pain through posture positioning and mechanical movements throughout a typical day.  4.  Patient will demonstrate improved overall function per FOTO Survey to CK = at least 40% but < 60% impaired, limited or restricted score or less.  5. Pt to begin a progressive walking program in order to increase her physical activity for ADLs and exercising.     Plan   Continue with established Plan of Care towards established PT goals.       Jax Vargas, PT  11/19/2018

## 2018-11-20 NOTE — PROGRESS NOTES
[facility]      Name: Vivienne Jose  Clinic Number: [id number]    Date of Treatment: 11/21/2018     Diagnosis:   Encounter Diagnosis   Name Primary?    Chronic left-sided low back pain with left-sided sciatica Yes     Physician: Amelia Antunez, *       Precautions: L knee OA/pain, HTN, diabetes     Pattern of pain determined: 2     Evaluation Date: 10/22/2018  Authorization Period Expiration: 12/31/18  Plan of Care Expiration: 1/21/18  Reassessment Due: 11/22/18 Completed 11/21/18  Next Due: 12/21/18    Visit # / Visits authorized: 8/ 20 (NB: No lap belt on machine)    Medicare charges: (506)    Time In: 0800  Time Out: 0900  Total Billable Time: 60 minutes     Subjective   Vivienne arrives to PT and reports her back pain is only at 1/10. She reports pain is no longer at the L hip too, it is now at the middle of the low back (centralized).     Patient reports their pain to be 1/10 on a 0-10 scale with 0 being no pain and 10 being the worst pain imaginable.  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documents   Leisure: computer,                      Pts goals:  To be able to walk further    Objective      MOVEMENT LOSS 11/21/18    ROM Loss   Flexion min loss, no curve reversal   Extension moderate loss   Side bending Right minimal loss   Side bending Left minimal loss   Rotation Right minimal loss   Rotation Left minimal loss       Baseline Isometric Testing on Med X equipment: Testing administered by PT  Date of testing: 10/22/18  ROM 0-30 deg   Max Peak Torque 117    Min Peak Torque 25    Flex/Ext Ratio 4.68:1   % below normative data 18   Counter weight 241   femur 6   Seat pad 0               CMS Impairment/Limitation/Restriction for FOTO lumbar Survey     Therapist reviewed FOTO scores for Vivienne Jose on 10/22/2018.   FOTO documents entered into [employer] - see Media section.     Limitation Score: 63%  Category:  Mobility     Current : CL = least 60% but < 80% impaired, limited or restricted  Goal: CK = at least 40% but < 60% impaired, limited or restricted                  Treatment      Home Exercise Program as follows: see pt instructions for HEP. Seated flexion  Handouts were given to the patient. Pt demo good understanding of the education provided. Vivienne demonstrated good return demonstration of activities.        Vivienne received therapeutic exercises to develop/improve posture, lumbar/cervical ROM, strength and muscular endurance for 60 minutes including the following exercises:     [facility] 11/21/2018   Visit Number 8   VAS Pain Rating 1   Time 10   Flexion in Lying 10   Flexion in Sitting 10   Manual Therapy -   Lumbar Extension Seat Pad -   Femur Restraint -   Top Dead Center -   Counterweight -   Lumbar Flexion -   Lumbar Extension -   Lumbar Peak Torque -   Min Torque -   Test Percent Below Normative Data -   Lumbar Weight 46   Repetitions 20   Rating of Perceived Exertion 2.5   Ice - Sitting 10       NB: possibly No lap belt    Seated flexion: x 15  Standing flexion : x 10  Supine flexion: x 10 with ball  Standing extension x 10  Prone press ups x 10      NEUROMUSCULAR:   Taping R knee for pain and stability.    Assessment     Patient was given a 10% wt increase today at 46#. She completed 20 reps at 2-3 RPE. She may benefit from another 5-10% wt increase at next visit. Her pain was at 0 during the lumbar extension exercise. No significant changes with Lumbar spine AROM. She reported feeling more flexible however. Patient is limited with mobility mainly due to the R knee pain. She reported that she is ready to get the Sx done with, and she will not loose the weight the MD want her to do.     Patient is making good progress towards established goals.  Pt will continue to benefit from skilled outpatient physical therapy to address the deficits stated in the impairment chart, provide pt/family education  and to maximize pt's level of independence in the home and community environment.       Pt's spiritual, cultural and educational needs considered and pt agreeable to plan of care and goals as stated below:     Medical necessity is demonstrated by the following IMPAIRMENTS/PROBLEMS:    Pt presents with the following impairments:      History  Co-morbidities and personal factors that may impact the plan of care Co-morbidities:   CAD, diabetes, HTN and OA, knee pain, obesity     Personal Factors:   coping style       moderate   Examination  Body Structures and Functions, activity limitations and participation restrictions that may impact the plan of care Body Regions:   back  lower extremities     Body Systems:    ROM  strength  gait     Participation Restrictions:   Walking, standing, house work, cooking showering     Activity limitations:   Learning and applying knowledge  no deficits     General Tasks and Commands  no deficits     Communication  no deficits     Mobility  lifting and carrying objects  walking     Self care  washing oneself (bathing, drying, washing hands)     Domestic Life  shopping  cooking  doing house work (cleaning house, washing dishes, laundry)     Interactions/Relationships  no deficits     Life Areas  no deficits     Community and Social Life  no deficits             moderate   Clinical Presentation stable and uncomplicated low   Decision Making/ Complexity Score: moderate         GOALS: Pt is in agreement with the following goals.     Short term goals:  6 weeks or 10 visits   1.  Pt will demonstrate increased lumbar ROM by at least 3 degrees from the initial ROM value with improvements noted in functional ROM and ability to perform ADLs  2.  Pt will demonstrate increased maximum isometric torque value by 5% when compared to the initial value resulting in improved ability to perform bending, lifting, and carrying activities safely, confidently.     3.  Patient report a reduction in worst pain  score by 1-2 points for improved tolerance during work and recreational activities (Progressing)  4.  Pt able to perform HEP correctly with minimal cueing or supervision for therapist (Progressing)        Long term goals: 13 weeks or 20 visits   1. Pt will demonstrate increased lumbar ROM by at least 6 degrees from initial ROM value, resulting in improved ability to perform functional fwd bending while standing and sitting.   2. Pt will demonstrate increased maximum isometric torque value by 10% when compared to the initial value resulting in improved ability to perform bending, lifting, and carrying activities safely, confidently. (Progressing)  3. Pt to demonstrate ability to independently control and reduce their pain through posture positioning and mechanical movements throughout a typical day. (Progressing)  4.  Patient will demonstrate improved overall function per FOTO Survey to CK = at least 40% but < 60% impaired, limited or restricted score or less.  5. Pt to begin a progressive walking program in order to increase her physical activity for ADLs and exercising. (Progressing)    Plan   Continue with established Plan of Care towards established PT goals.       Jax Vargas, PT  11/21/2018

## 2018-11-21 ENCOUNTER — CLINICAL SUPPORT (OUTPATIENT)
Dept: REHABILITATION | Facility: OTHER | Age: 68
End: 2018-11-21
Attending: PHYSICAL MEDICINE & REHABILITATION
Payer: MEDICARE

## 2018-11-21 DIAGNOSIS — G89.29 CHRONIC LEFT-SIDED LOW BACK PAIN WITH LEFT-SIDED SCIATICA: Primary | ICD-10-CM

## 2018-11-21 DIAGNOSIS — M54.42 CHRONIC LEFT-SIDED LOW BACK PAIN WITH LEFT-SIDED SCIATICA: Primary | ICD-10-CM

## 2018-11-21 PROCEDURE — 97110 THERAPEUTIC EXERCISES: CPT | Mod: HCNC

## 2018-11-27 ENCOUNTER — CLINICAL SUPPORT (OUTPATIENT)
Dept: REHABILITATION | Facility: OTHER | Age: 68
End: 2018-11-27
Attending: PHYSICAL MEDICINE & REHABILITATION
Payer: MEDICARE

## 2018-11-27 ENCOUNTER — PATIENT MESSAGE (OUTPATIENT)
Dept: PODIATRY | Facility: CLINIC | Age: 68
End: 2018-11-27

## 2018-11-27 DIAGNOSIS — G89.29 CHRONIC LEFT-SIDED LOW BACK PAIN WITH LEFT-SIDED SCIATICA: Primary | ICD-10-CM

## 2018-11-27 DIAGNOSIS — M54.42 CHRONIC LEFT-SIDED LOW BACK PAIN WITH LEFT-SIDED SCIATICA: Primary | ICD-10-CM

## 2018-11-27 PROCEDURE — 97110 THERAPEUTIC EXERCISES: CPT | Mod: HCNC

## 2018-11-27 PROCEDURE — 97542 WHEELCHAIR MNGMENT TRAINING: CPT | Mod: HCNC

## 2018-11-27 NOTE — PROGRESS NOTES
[facility]      Name: Vivienne Jose  Clinic Number: [id number]    Date of Treatment: 11/27/2018     Diagnosis:   Encounter Diagnosis   Name Primary?    Chronic left-sided low back pain with left-sided sciatica Yes     Physician: Amelia Antunez, *       Precautions: L knee OA/pain, HTN, diabetes     Pattern of pain determined: 2     Evaluation Date: 10/22/2018  Authorization Period Expiration: 12/31/18  Plan of Care Expiration: 1/21/18  Reassessment Due: 12/21/18    Visit # / Visits authorized: 9/ 20 (NB: No lap belt on machine)    Medicare charges: ([phone])    Time In: 03:30  Time Out: 04:30  Total Billable Time: 30 minutes     Subjective   Vivienne arrives to PT and reports her back pain is only at 1/10. She reports pain is no longer at the L hip too, it is now at the middle of the low back (centralized). She reports her stomach is bothering her, implying she may be gassy or have acid reflux. She reported feeling no different by end of exercise.     Patient reports their pain to be 1/10 on a 0-10 scale with 0 being no pain and 10 being the worst pain imaginable.  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documents   Leisure: computer,                      Pts goals:  To be able to walk further    Objective      MOVEMENT LOSS 11/21/18    ROM Loss   Flexion min loss, no curve reversal   Extension moderate loss   Side bending Right minimal loss   Side bending Left minimal loss   Rotation Right minimal loss   Rotation Left minimal loss       Baseline Isometric Testing on Med X equipment: Testing administered by PT  Date of testing: 10/22/18  ROM 0-30 deg (increased to 36-0 on 11/27/18)    Max Peak Torque 117    Min Peak Torque 25    Flex/Ext Ratio 4.68:1   % below normative data 18   Counter weight 241   femur 6   Seat pad 0               CMS Impairment/Limitation/Restriction for FOTO lumbar Survey     Therapist reviewed FOTO  scores for Vivienne Jose on 10/22/2018.   FOTO documents entered into [employer] - see Media section.     Limitation Score: 63%  Category: Mobility     Current : CL = least 60% but < 80% impaired, limited or restricted  Goal: CK = at least 40% but < 60% impaired, limited or restricted                  Treatment      Home Exercise Program as follows: see pt instructions for HEP. Seated flexion  Handouts were given to the patient. Pt demo good understanding of the education provided. Vivienne demonstrated good return demonstration of activities.        Vivienne received therapeutic exercises to develop/improve posture, lumbar/cervical ROM, strength and muscular endurance for 60 minutes including the following exercises:   [facility] 11/27/2018   Visit Number 9   VAS Pain Rating 1   Time 10   Flexion in Lying 10   Flexion in Sitting 10   Manual Therapy -   Lumbar Extension Seat Pad -   Femur Restraint -   Top Dead Center -   Counterweight -   Lumbar Flexion 36   Lumbar Extension 0   Lumbar Peak Torque -   Min Torque -   Test Percent Below Normative Data -   Lumbar Weight 46   Repetitions 20   Rating of Perceived Exertion 3   Ice - Sitting 10           NB: possibly No lap belt    HEP:    Seated flexion: x 15  Standing flexion : x 10  Supine flexion: x 10 with ball  Standing extension x 10  Prone press ups x 10        NEUROMUSCULAR:   Taping R knee for pain and stability.None today    Assessment     Patient presents with mild low back stiffness and some indication of acid reflux which did not change by end of session. Pt able to tolerate Med X lumbar extension exercise with ROM increase to 36-0 at same weight without increased symptoms. She may benefit from another 5-10% wt increase at next visit.She reported feeling more flexible however. Patient is limited with mobility mainly due to the R knee pain. She reported that she is ready to get the Sx done with, and she will not loose the weight the MD want her to do. Pt admits  "she has tried many different kinds of classes, programs, and diets but she is too "lazy" to initiate significant changes. She is not interested in meeting with health  at this time but will try to drink more water.     Patient is making good progress towards established goals.  Pt will continue to benefit from skilled outpatient physical therapy to address the deficits stated in the impairment chart, provide pt/family education and to maximize pt's level of independence in the home and community environment.       Pt's spiritual, cultural and educational needs considered and pt agreeable to plan of care and goals as stated below:     Medical necessity is demonstrated by the following IMPAIRMENTS/PROBLEMS:    Pt presents with the following impairments:      History  Co-morbidities and personal factors that may impact the plan of care Co-morbidities:   CAD, diabetes, HTN and OA, knee pain, obesity     Personal Factors:   coping style       moderate   Examination  Body Structures and Functions, activity limitations and participation restrictions that may impact the plan of care Body Regions:   back  lower extremities     Body Systems:    ROM  strength  gait     Participation Restrictions:   Walking, standing, house work, cooking showering     Activity limitations:   Learning and applying knowledge  no deficits     General Tasks and Commands  no deficits     Communication  no deficits     Mobility  lifting and carrying objects  walking     Self care  washing oneself (bathing, drying, washing hands)     Domestic Life  shopping  cooking  doing house work (cleaning house, washing dishes, laundry)     Interactions/Relationships  no deficits     Life Areas  no deficits     Community and Social Life  no deficits             moderate   Clinical Presentation stable and uncomplicated low   Decision Making/ Complexity Score: moderate         GOALS: Pt is in agreement with the following goals.     Short term goals:  6 weeks " or 10 visits   1.  Pt will demonstrate increased lumbar ROM by at least 3 degrees from the initial ROM value with improvements noted in functional ROM and ability to perform ADLs  2.  Pt will demonstrate increased maximum isometric torque value by 5% when compared to the initial value resulting in improved ability to perform bending, lifting, and carrying activities safely, confidently.     3.  Patient report a reduction in worst pain score by 1-2 points for improved tolerance during work and recreational activities (Progressing)  4.  Pt able to perform HEP correctly with minimal cueing or supervision for therapist (Progressing)        Long term goals: 13 weeks or 20 visits   1. Pt will demonstrate increased lumbar ROM by at least 6 degrees from initial ROM value, resulting in improved ability to perform functional fwd bending while standing and sitting.   2. Pt will demonstrate increased maximum isometric torque value by 10% when compared to the initial value resulting in improved ability to perform bending, lifting, and carrying activities safely, confidently. (Progressing)  3. Pt to demonstrate ability to independently control and reduce their pain through posture positioning and mechanical movements throughout a typical day. (Progressing)  4.  Patient will demonstrate improved overall function per FOTO Survey to CK = at least 40% but < 60% impaired, limited or restricted score or less.  5. Pt to begin a progressive walking program in order to increase her physical activity for ADLs and exercising. (Progressing)    Plan   Continue with established Plan of Care towards established PT goals.       Fabiola Zabala, PT  11/27/2018

## 2018-11-29 ENCOUNTER — CLINICAL SUPPORT (OUTPATIENT)
Dept: REHABILITATION | Facility: OTHER | Age: 68
End: 2018-11-29
Attending: PHYSICAL MEDICINE & REHABILITATION
Payer: MEDICARE

## 2018-11-29 DIAGNOSIS — G89.29 CHRONIC LEFT-SIDED LOW BACK PAIN WITH LEFT-SIDED SCIATICA: Primary | ICD-10-CM

## 2018-11-29 DIAGNOSIS — M54.42 CHRONIC LEFT-SIDED LOW BACK PAIN WITH LEFT-SIDED SCIATICA: Primary | ICD-10-CM

## 2018-11-29 PROCEDURE — G8978 MOBILITY CURRENT STATUS: HCPCS | Mod: CL,HCNC

## 2018-11-29 PROCEDURE — 97750 PHYSICAL PERFORMANCE TEST: CPT | Mod: HCNC

## 2018-11-29 PROCEDURE — 97110 THERAPEUTIC EXERCISES: CPT | Mod: HCNC

## 2018-11-29 PROCEDURE — G8979 MOBILITY GOAL STATUS: HCPCS | Mod: CK,HCNC

## 2018-11-29 NOTE — PROGRESS NOTES
[facility]      Name: Vivienne Jose  Clinic Number: [id number]    Date of Treatment: 11/29/2018     Diagnosis:   Encounter Diagnosis   Name Primary?    Chronic left-sided low back pain with left-sided sciatica Yes     Physician: Amelia Antunez, *       Precautions: L knee OA/pain, HTN, diabetes     Pattern of pain determined: 2     Evaluation Date: 10/22/2018  Authorization Period Expiration: 12/31/18  Plan of Care Expiration: 1/21/18  Reassessment Due: 12/21/18    Visit # / Visits authorized: 10/ 20 (NB: No lap belt on machine)    Medicare charges: (749)    Time In: 03:30  Time Out: 04:30  Total Billable Time: 30 minutes     Subjective   Vivienne arrives to PT and reports her back pain is only at 1/10. She reports pain is no longer at the L hip too, it is now at the middle of the low back (centralized). She feels the main limited factor is her right knee pain which forces her to limp and makes her back hurt. She reports her stomach feels much better compared to last session.     Patient reports their pain to be 1/10 on a 0-10 scale with 0 being no pain and 10 being the worst pain imaginable.  Pain Location: L side Low back     Occupation:  Part time clerical work, sits at computer and [employer]   Leisure: computer,                      Pts goals:  To be able to walk further    Objective      MOVEMENT LOSS 11/21/18    ROM Loss   Flexion min loss, no curve reversal   Extension moderate loss   Side bending Right minimal loss   Side bending Left minimal loss   Rotation Right minimal loss   Rotation Left minimal loss       Baseline Isometric Testing on Med X equipment: Testing administered by PT  Date of testing: 10/22/18  ROM 0-30 deg (increased to 36-0 on 11/27/18)    Max Peak Torque 117    Min Peak Torque 25    Flex/Ext Ratio 4.68:1   % below normative data 18   Counter weight 241   femur 6   Seat pad 0               CMS Impairment/Limitation/Restriction for FOTO lumbar  Survey     Therapist reviewed FOTO scores for Vivienne Jose on 10/22/2018.   FOTO documents entered into [employer] - see Media section.     Limitation Score: 63%  Category: Mobility     Current : CL = least 60% but < 80% impaired, limited or restricted  Goal: CK = at least 40% but < 60% impaired, limited or restricted    Visit 10: 60%                  Treatment      Home Exercise Program as follows: see pt instructions for HEP. Seated flexion  Handouts were given to the patient. Pt demo good understanding of the education provided. Vivienne demonstrated good return demonstration of activities.        Vivienne received therapeutic exercises to develop/improve posture, lumbar/cervical ROM, strength and muscular endurance for 60 minutes including the following exercises:     [employer] Therapy 11/29/2018   Visit Number 10   VAS Pain Rating 1   Time 10   Flexion in Lying 10   Flexion in Sitting 10   Manual Therapy -   Lumbar Extension Seat Pad -   Femur Restraint -   Top Dead Center -   Counterweight -   Lumbar Flexion 36   Lumbar Extension 0   Lumbar Peak Torque 134   Min Torque 30   Test Percent Below Normative Data 8   Test Percent Gain in Strength from Initial  3   Lumbar Weight 30   Repetitions -   Rating of Perceived Exertion -   Ice - Sitting 10               NB: possibly No lap belt    HEP:    Seated flexion: x 15  Standing flexion : x 10  Supine flexion: x 10 with ball  Standing extension x 10  Prone press ups x 10        NEUROMUSCULAR:   Taping R knee for pain and stability.None today    Assessment     Patient has attended [facility] which included MD evaluation, PT evaluation with isometric testing, and physical therapy treatment including HEP instruction, education, aerobic work, dynamic strengthening on med ex equipment for the spine, and whole body strengthening on med ex equipment with increasing weight loads.  Patient  is demonstrating increased ability to reduce symptoms, improved posture,  improved lumbar ROM by 6 degrees, and improved lumbar strength on med ex test by  3% average.  Pt able to tolerate Med X lumbar extension exercise testing without increased symptoms. She may benefit from another 5-10% wt increase at next visit. Patient is limited with mobility mainly due to the R knee pain. She may receive injections and/or start therapy for knee pain     Patient is making good progress towards established goals.  Pt will continue to benefit from skilled outpatient physical therapy to address the deficits stated in the impairment chart, provide pt/family education and to maximize pt's level of independence in the home and community environment.       Pt's spiritual, cultural and educational needs considered and pt agreeable to plan of care and goals as stated below:     Medical necessity is demonstrated by the following IMPAIRMENTS/PROBLEMS:    Pt presents with the following impairments:      History  Co-morbidities and personal factors that may impact the plan of care Co-morbidities:   CAD, diabetes, HTN and OA, knee pain, obesity     Personal Factors:   coping style       moderate   Examination  Body Structures and Functions, activity limitations and participation restrictions that may impact the plan of care Body Regions:   back  lower extremities     Body Systems:    ROM  strength  gait     Participation Restrictions:   Walking, standing, house work, cooking showering     Activity limitations:   Learning and applying knowledge  no deficits     General Tasks and Commands  no deficits     Communication  no deficits     Mobility  lifting and carrying objects  walking     Self care  washing oneself (bathing, drying, washing hands)     Domestic Life  shopping  cooking  doing house work (cleaning house, washing dishes, laundry)     Interactions/Relationships  no deficits     Life Areas  no deficits     Community and Social Life  no deficits             moderate   Clinical Presentation stable and  uncomplicated low   Decision Making/ Complexity Score: moderate         GOALS: Pt is in agreement with the following goals.     Short term goals:  6 weeks or 10 visits   1.  Pt will demonstrate increased lumbar ROM by at least 3 degrees from the initial ROM value with improvements noted in functional ROM and ability to perform ADLs Met 11/27/18  2.  Pt will demonstrate increased maximum isometric torque value by 5% when compared to the initial value resulting in improved ability to perform bending, lifting, and carrying activities safely, confidently. (Progressing)     3.  Patient report a reduction in worst pain score by 1-2 points for improved tolerance during work and recreational activities (Progressing)  4.  Pt able to perform HEP correctly with minimal cueing or supervision for therapist (Progressing)        Long term goals: 13 weeks or 20 visits   1. Pt will demonstrate increased lumbar ROM by at least 6 degrees from initial ROM value, resulting in improved ability to perform functional fwd bending while standing and sitting. Met 11/27/18  2. Pt will demonstrate increased maximum isometric torque value by 10% when compared to the initial value resulting in improved ability to perform bending, lifting, and carrying activities safely, confidently. (Progressing)  3. Pt to demonstrate ability to independently control and reduce their pain through posture positioning and mechanical movements throughout a typical day. (Progressing)  4.  Patient will demonstrate improved overall function per FOTO Survey to CK = at least 40% but < 60% impaired, limited or restricted score or less.  5. Pt to begin a progressive walking program in order to increase her physical activity for ADLs and exercising. (Progressing)    Plan   Continue with established Plan of Care towards established PT goals.       Fabiola Zabala, PT  11/29/2018

## 2018-11-30 ENCOUNTER — CLINICAL SUPPORT (OUTPATIENT)
Dept: INTERNAL MEDICINE | Facility: CLINIC | Age: 68
End: 2018-11-30
Payer: MEDICARE

## 2018-11-30 DIAGNOSIS — J30.2 SEASONAL ALLERGIC RHINITIS, UNSPECIFIED TRIGGER: ICD-10-CM

## 2018-11-30 PROCEDURE — 95115 IMMUNOTHERAPY ONE INJECTION: CPT | Mod: HCNC,S$GLB,, | Performed by: FAMILY MEDICINE

## 2018-11-30 PROCEDURE — 99499 UNLISTED E&M SERVICE: CPT | Mod: HCNC,S$GLB,, | Performed by: ALLERGY & IMMUNOLOGY

## 2018-12-03 ENCOUNTER — CLINICAL SUPPORT (OUTPATIENT)
Dept: REHABILITATION | Facility: OTHER | Age: 68
End: 2018-12-03
Attending: PHYSICAL MEDICINE & REHABILITATION
Payer: MEDICARE

## 2018-12-03 DIAGNOSIS — M54.42 CHRONIC LEFT-SIDED LOW BACK PAIN WITH LEFT-SIDED SCIATICA: Primary | ICD-10-CM

## 2018-12-03 DIAGNOSIS — G89.29 CHRONIC LEFT-SIDED LOW BACK PAIN WITH LEFT-SIDED SCIATICA: Primary | ICD-10-CM

## 2018-12-03 PROCEDURE — 97110 THERAPEUTIC EXERCISES: CPT | Mod: HCNC

## 2018-12-05 ENCOUNTER — CLINICAL SUPPORT (OUTPATIENT)
Dept: REHABILITATION | Facility: OTHER | Age: 68
End: 2018-12-05
Attending: PHYSICAL MEDICINE & REHABILITATION
Payer: MEDICARE

## 2018-12-05 DIAGNOSIS — G89.29 CHRONIC LEFT-SIDED LOW BACK PAIN WITH LEFT-SIDED SCIATICA: Primary | ICD-10-CM

## 2018-12-05 DIAGNOSIS — M54.42 CHRONIC LEFT-SIDED LOW BACK PAIN WITH LEFT-SIDED SCIATICA: Primary | ICD-10-CM

## 2018-12-05 PROCEDURE — 97110 THERAPEUTIC EXERCISES: CPT | Mod: HCNC | Performed by: PHYSICAL THERAPIST

## 2018-12-05 PROCEDURE — 97140 MANUAL THERAPY 1/> REGIONS: CPT | Mod: HCNC

## 2018-12-05 NOTE — PROGRESS NOTES
[facility]      Name: Vivienne Jose  Clinic Number: [id number]    Date of Treatment: 12/05/2018     Diagnosis:   Encounter Diagnosis   Name Primary?    Chronic left-sided low back pain with left-sided sciatica Yes     Physician: Amelia Antunez, *       Precautions: L knee OA/pain, HTN, diabetes     Pattern of pain determined: 2     Evaluation Date: 10/22/2018  Authorization Period Expiration: 12/31/18  Plan of Care Expiration: 1/21/18  Reassessment Due: 12/21/18    Visit # / Visits authorized: 12/ 20 (Needling next session) (NB: No lap belt on machine)    Medicare charges: (403)    Time In: 2:30  Time Out: 3:30  Total Billable Time: 50 minutes     Subjective   Vivienne arrives to PT and reports her back pain is only at 1/10. She reports pain is no longer at the L hip too, it is now at the middle of the low back (centralized). She feels the main limited factor is her right knee pain which forces her to limp and makes her back hurt. She feels that she can walk longer with less pain.  She gets good relief with FDN    Patient reports their pain to be 1/10 on a 0-10 scale with 0 being no pain and 10 being the worst pain imaginable.  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[employer]   Leisure: computer,                      Pts goals:  To be able to walk further    Objective      MOVEMENT LOSS 11/21/18    ROM Loss   Flexion min loss, no curve reversal   Extension moderate loss   Side bending Right minimal loss   Side bending Left minimal loss   Rotation Right minimal loss   Rotation Left minimal loss       Baseline Isometric Testing on Med X equipment: Testing administered by PT  Date of testing: 10/22/18  ROM 0-30 deg (increased to 36-0 on 11/27/18)    Max Peak Torque 117    Min Peak Torque 25    Flex/Ext Ratio 4.68:1   % below normative data 18   Counter weight 241   femur 6   Seat pad 0               CMS  Impairment/Limitation/Restriction for FOTO lumbar Survey     Therapist reviewed FOTO scores for Vivienne Jose on 10/22/2018.   FOTO documents entered into [employer] - see Media section.     Limitation Score: 63%  Category: Mobility     Current : CL = least 60% but < 80% impaired, limited or restricted  Goal: CK = at least 40% but < 60% impaired, limited or restricted    Visit 10: 60%                  Treatment      Home Exercise Program as follows: see pt instructions for HEP. Seated flexion  Handouts were given to the patient. Pt demo good understanding of the education provided. Vivienne demonstrated good return demonstration of activities.        Vivienne received therapeutic exercises to develop/improve posture, lumbar  ROM, strength and muscular endurance for 60 minutes including the following exercises:       [facility] 12/5/2018   Visit Number 12   VAS Pain Rating 1   Time 10   Flexion in Lying 10   Flexion in Sitting 10   Lumbar Weight 53   Repetitions 20   Rating of Perceived Exertion 3   Ice - Sitting 10       NB: possibly No lap belt    HEP:    Seated flexion: x 15  Standing flexion : x 10  Supine flexion: x 10 with ball  Standing extension x 10  Prone press ups x 10        NEUROMUSCULAR:   Taping R knee for pain and stability.None today    Assessment       Patient is making good progress towards established goals. Pt is slowly progressing and she is compliant with her HEP. She tolerated the lumbar machine with no c/o LBP. She gets good relief from needling. Good tolerance to weight increase to 53 ftlbs and able to complete 20 reps without difficulty  Pt will continue to benefit from skilled outpatient physical therapy to address the deficits stated in the impairment chart, provide pt/family education and to maximize pt's level of independence in the home and community environment.       Pt's spiritual, cultural and educational needs considered and pt agreeable to plan of care and goals as stated below:      Medical necessity is demonstrated by the following IMPAIRMENTS/PROBLEMS:    Pt presents with the following impairments:      History  Co-morbidities and personal factors that may impact the plan of care Co-morbidities:   CAD, diabetes, HTN and OA, knee pain, obesity     Personal Factors:   coping style       moderate   Examination  Body Structures and Functions, activity limitations and participation restrictions that may impact the plan of care Body Regions:   back  lower extremities     Body Systems:    ROM  strength  gait     Participation Restrictions:   Walking, standing, house work, cooking showering     Activity limitations:   Learning and applying knowledge  no deficits     General Tasks and Commands  no deficits     Communication  no deficits     Mobility  lifting and carrying objects  walking     Self care  washing oneself (bathing, drying, washing hands)     Domestic Life  shopping  cooking  doing house work (cleaning house, washing dishes, laundry)     Interactions/Relationships  no deficits     Life Areas  no deficits     Community and Social Life  no deficits             moderate   Clinical Presentation stable and uncomplicated low   Decision Making/ Complexity Score: moderate         GOALS: Pt is in agreement with the following goals.     Short term goals:  6 weeks or 10 visits   1.  Pt will demonstrate increased lumbar ROM by at least 3 degrees from the initial ROM value with improvements noted in functional ROM and ability to perform ADLs Met 11/27/18  2.  Pt will demonstrate increased maximum isometric torque value by 5% when compared to the initial value resulting in improved ability to perform bending, lifting, and carrying activities safely, confidently. (Progressing)     3.  Patient report a reduction in worst pain score by 1-2 points for improved tolerance during work and recreational activities (Progressing)  4.  Pt able to perform HEP correctly with minimal cueing or supervision for  therapist (Progressing)        Long term goals: 13 weeks or 20 visits   1. Pt will demonstrate increased lumbar ROM by at least 6 degrees from initial ROM value, resulting in improved ability to perform functional fwd bending while standing and sitting. Met 11/27/18  2. Pt will demonstrate increased maximum isometric torque value by 10% when compared to the initial value resulting in improved ability to perform bending, lifting, and carrying activities safely, confidently. (Progressing)  3. Pt to demonstrate ability to independently control and reduce their pain through posture positioning and mechanical movements throughout a typical day. (Progressing)  4.  Patient will demonstrate improved overall function per FOTO Survey to CK = at least 40% but < 60% impaired, limited or restricted score or less.  5. Pt to begin a progressive walking program in order to increase her physical activity for ADLs and exercising. (Progressing)    Plan   Continue with established Plan of Care towards established PT goals.       Medina Delaney, PT  12/5/2018

## 2018-12-06 NOTE — PROGRESS NOTES
Dry Needling Daily Note     Patient ID: Vivienne Jose is a 68 y.o. female.  Diagnosis:   1. Chronic left-sided low back pain with left-sided sciatica       Date: 12/5/18  Start Time:  240pm  Stop Time:  250pm      Subjective:     Pt reports: FDN eased her pain last session and wishes to try again  Pain Scale: Vivienne rates pain on a scale of 0-10 to be 3 currently.      Objective:     Pt signed written consent to dry needling Rx.  Pt gave verbal consent for DN.   Pt rec'd dry needling to B LB/hips with 1 in needles with no adverse effects.    Homeostatic points:  1. Deep Radial  2. Greater Auricular  3. Spinal Accessory  4. Saphenous  5. Deep Fibular  6. Tibial  7. Greater Occipital  8. Suprascapular ( infraspinatus)  9. Lateral Antebrachial Cutaneous  10. Sural  11. Lateral Popliteal  12. Superficial Radial  13. Dorsal Scapular  14. Superior Cluneal  15. Posterior Cutaneous L 2  16. Inferior Gluteal  17. Lateral Pectoral  18. Ilitotibal  19. Infraorbital  20. Spinous process T7  21. Posterior cutaneous  T6  22. Posterior cutaneous L 5  23. Supraorbital  24. Common fibular    Paravertebral Points:  L:4/L5/L3 B    Symptomatic Points:   none    Assessment:     Patient demonstrated appropriate response to FDN. No adverse effects noted.    Patient Education/Response:     Education provided re: Discussed increasing dosage as needed  Vivienne verbalized good understanding of education     Plans and Goals:     Monitor response to FDN. Continue with FDN in POC as tolerated.     Ariana Clifford, PT  12/5/18

## 2018-12-10 ENCOUNTER — OFFICE VISIT (OUTPATIENT)
Dept: SLEEP MEDICINE | Facility: CLINIC | Age: 68
End: 2018-12-10
Payer: MEDICARE

## 2018-12-10 VITALS
DIASTOLIC BLOOD PRESSURE: 70 MMHG | WEIGHT: 245 LBS | BODY MASS INDEX: 45.08 KG/M2 | SYSTOLIC BLOOD PRESSURE: 145 MMHG | HEIGHT: 62 IN | HEART RATE: 80 BPM

## 2018-12-10 DIAGNOSIS — G47.33 OSA (OBSTRUCTIVE SLEEP APNEA): Primary | ICD-10-CM

## 2018-12-10 PROCEDURE — 3077F SYST BP >= 140 MM HG: CPT | Mod: CPTII,HCNC,S$GLB, | Performed by: PSYCHIATRY & NEUROLOGY

## 2018-12-10 PROCEDURE — 99999 PR PBB SHADOW E&M-EST. PATIENT-LVL IV: CPT | Mod: PBBFAC,HCNC,, | Performed by: PSYCHIATRY & NEUROLOGY

## 2018-12-10 PROCEDURE — 99214 OFFICE O/P EST MOD 30 MIN: CPT | Mod: HCNC,S$GLB,, | Performed by: PSYCHIATRY & NEUROLOGY

## 2018-12-10 PROCEDURE — 1101F PT FALLS ASSESS-DOCD LE1/YR: CPT | Mod: CPTII,HCNC,S$GLB, | Performed by: PSYCHIATRY & NEUROLOGY

## 2018-12-10 PROCEDURE — 3078F DIAST BP <80 MM HG: CPT | Mod: CPTII,HCNC,S$GLB, | Performed by: PSYCHIATRY & NEUROLOGY

## 2018-12-10 NOTE — PROGRESS NOTES
Vivienne Jose  was seen at the request of  No ref. provider found for sleep evaluation.    08/28/2018 INITIAL HISTORY OF PRESENT ILLNESS:  Vivienne Jose is a 68 y.o. female is here to be evaluated for a sleep disorder.       CHIEF COMPLAINT:      The patient's complaints include excessive daytime sleepiness, excessive daytime fatigue, snoring,  witnessed breathing pauses,  gasping for air in sleep and interrupted sleep since  2004, when she was diagnosed with severe BAM.    She has been well controlled for about 10 years.    She was previously seeing Dr. Wilkins.    HEr machine is 10 years old and needs replacement.    She noticed feeling more tired and waking up more frequently over the last year.    She reports occasional break through snoring.    She believes she has gained 50 lbs since titration.    She replaces her Durand mask regularly with a chin strap.    Reports  dry mouth and sore throat  Reports nasal congestion - Claritin and now Flonase and Asteline  Reports  morning headaches  Reports  interrupted sleep  Denies frequent leg movements  Denies symptoms concerning for parasomnia    The ESS ([place] Sleepiness Score) taken on initial visit is 15 /24    The patient had tonsillectomy in the past     06/11/2014: She comes with her new Resmed machine  today. Set at 12 cm H2O. No issue with mouthleak, dry mouth, break through snoring. Recent titration showed 12-13 cm H2O to be effective. Unfortunately she is still feeling sleepy - ESS 18/24. No PLMS found on sleep study.    CPAP pressure: 12 cm H2O  Mask comfort / fit:  Durand - fits fine    Pressure tolerance: fine; not using ramp   Humidification: fine   CPAP Interrogation:    Ave daily usage:7.8 hours /7days  Ave daily usage:7.8 hours /30days  Days >4 hours usage: 7 /7 days  Days >4 hours usage: 30/30 days   Machine condition: good     Frequent arousals were noted even when her respiratory events have been controlled.Her bedroom is dark and she denies  physical discomfort and polyuria. No PLMS reported on the sleep study. Denied leg discomfort.    08/28/2018: AStkoffi sleepy with inactivity. Significant early afternoon drowsiness.   CPAP 12 cm (211 on manometry). Study 2014 recommended 12-13 cm.  At times needs to rearranged her Durand mask at night.  Using chin strap. Did not like Dreamwear.No break through snoring.  Gained 2 lbs.    AHi 0.3  9 hrs per day  3 L/min leak  100% Compliance.    EPWORTH SLEEPINESS SCALE 8/23/2018   Sitting and reading 3   Watching TV 2   Sitting, inactive in a public place (e.g. a theatre or a meeting) 1   As a passenger in a car for an hour without a break 2   Lying down to rest in the afternoon when circumstances permit 3   Sitting and talking to someone 0   Sitting quietly after a lunch without alcohol 0   In a car, while stopped for a few minutes in traffic 0   Total score 11     12/10/2018:    CPAP 13 cm on Airsence straight CPAP. Feeling better at 13.5, but still residual sleepiness.  Concerned about Gabapentin effect. Recent titration showed CPAP 12 cm as effective in terms of AHI.  Dide not tolerate FFM - very uncomfortable; back to Durand for hear  Keeps humidity 2/8 because of condensation (bedroom very cold).  No break through snoring.  The patient reports improved sleep continuity and daytime sleepiness on PAP. ESS today is 14/24.  Denies break through snoring. ++++ dry mouth.     Recent titration was discussed.    EPWORTH SLEEPINESS SCALE 12/8/2018   Sitting and reading 3   Watching TV 2   Sitting, inactive in a public place (e.g. a theatre or a meeting) 1   As a passenger in a car for an hour without a break 3   Lying down to rest in the afternoon when circumstances permit 3   Sitting and talking to someone 0   Sitting quietly after a lunch without alcohol 2   In a car, while stopped for a few minutes in traffic 0   Total score 14     SLEEP ROUTINE 12/10/2018 :    Bed partner:   Time to bed: 10 PM  Sleep onset  latency: 5 min  Disruptions or awakenings: 4-5  Time to fall back into sleep: 1 min  Wakeup time: 5 AM   Perceived sleep quality: 2/5  Perceived total sleep time:  7  hours.  Daytime naps: 0  Weekend sleep routine: 7 AM  Exercise routine: N/A     PREVIOUS SLEEP STUDIES:     PSG/ SPLIT night study  In 2004 showed significant BAM with the AHI of 33.1/hour and SaO2 minimum of 88 %. Effective control of respiratory events was achieved at   10 cm H2O. Sleep efficiency was 79 %.    CPAP titration on 4/23/14: Adequate control of respiratory events was achieved at 12-13 cm of H2O. Weight 238 lbs. Frequent arousals were noted even when her respiratory events have been controlled.  CPAP titration on 9/19/18: Effective control of respiratory events was achieved at 12 cm of H2O, however SaO2 was high 80s-low 90s. Weight 241 lbs.        DME: [facility]      PAST MEDICAL HISTORY:    Active Ambulatory Problems     Diagnosis Date Noted    Trigger middle finger of right hand 07/30/2012    Kidney stones 08/24/2012    HTN (hypertension) 08/24/2012    Post-surgical hypothyroidism 11/23/2012    CAD (coronary artery disease) 11/23/2012    Chronic allergic rhinitis 11/30/2012    Nuclear sclerosis - Both Eyes 04/02/2013    Carpal tunnel syndrome of right wrist 07/08/2014    Pain in limb 07/24/2014    Stiffness of joint 07/24/2014    Muscle weakness 07/24/2014    Proteinuria 02/03/2015    Dyslipidemia associated with type 2 diabetes mellitus 04/28/2015    BAM (obstructive sleep apnea) 04/28/2015    Sacroiliitis 06/09/2015    Lumbar facet arthropathy 06/09/2015    Physical deconditioning 06/09/2015    Preventative health care 09/22/2015    Osteopenia 09/22/2015    Allergic rhinitis 09/22/2015    Type 2 diabetes mellitus, uncontrolled 09/22/2015    Hypertension associated with diabetes 09/22/2015    Hypothyroidism due to acquired atrophy of thyroid 09/22/2015    Vitamin D deficiency 09/22/2015    Sleep apnea  09/22/2015    Hypocalcemia 02/29/2016    Morbid obesity with body mass index (BMI) of 40.0 to 44.9 in adult 02/29/2016    Recurrent HSV (herpes simplex virus) 03/21/2016    Chronic pain of right knee 05/02/2016    Gastroesophageal reflux disease 07/22/2016    Hyperlipidemia 07/22/2016    Nephrolithiasis 12/14/2016    Recurrent UTI 03/02/2017    Hypokalemia 04/24/2017    Diabetic polyneuropathy associated with type 2 diabetes mellitus 04/24/2017    Diverticulosis of intestine without bleeding 08/02/2017    Skin nodule 08/02/2017    Facet arthropathy, cervical 07/25/2011    Bilateral carotid artery disease 07/26/2011    Type 2 diabetes mellitus with stage 3 chronic kidney disease, with long-term current use of insulin 02/23/2018    Chronic kidney disease, stage III (moderate) 02/23/2018    Fatigue 06/21/2018    Gait instability 07/16/2018    Interstitial cystitis 09/21/2018    Chronic left-sided low back pain with left-sided sciatica 10/22/2018    Hypothyroidism 11/16/2018    Needs flu shot 11/16/2018    Hematuria 11/16/2018     Resolved Ambulatory Problems     Diagnosis Date Noted    Allergic conjunctivitis 11/23/2012    Screen for colon cancer 08/29/2014    UTI symptoms 11/05/2014    URI (upper respiratory infection) 01/07/2015    LBP radiating to left leg 06/12/2015    Body mass index 45.0-49.9, adult 09/22/2015    Nausea & vomiting 11/23/2015    Dysuria 02/29/2016    Soft tissue mass 08/04/2017    Low vitamin D level 03/14/2018     Past Medical History:   Diagnosis Date    Allergy     Angio-edema     Arthritis     Cataract     Cerebrovascular malformation     Colon polyps     Coronary artery disease     Diabetes mellitus, type 2     Diabetic peripheral neuropathy     GERD (gastroesophageal reflux disease)     Herpes infection     Hyperlipidemia     Hypertension     Hypothyroidism     Kidney stones     Nausea & vomiting 11/23/2015    Obesity, morbid     Ovarian  cyst     Pyelonephritis, chronic     Seizure disorder, focal motor     Sleep apnea     Type II or unspecified type diabetes mellitus with neurological manifestations, uncontrolled(250.62)     Urinary tract infection     Urticaria     Vaginal infection                 PAST SURGICAL HISTORY:    Past Surgical History:   Procedure Laterality Date    CARPAL TUNNEL RELEASE Right 2014    COLONOSCOPY      COLONOSCOPY N/A 9/13/2017    Procedure: COLONOSCOPY Golytely;  Surgeon: Gisela Wall MD;  Location: [facility] ENDO;  Service: Endoscopy;  Laterality: N/A;    COLONOSCOPY N/A 8/29/2014    Performed by [facility]ela [facility] at [facility]    COLONOSCOPY Golytely N/A 9/13/2017    [facility] at [facility]    CYST REMOVAL      skin; multiples    CYSTOSCOPY WITH STENT PLACEMENT Left 12/28/2016    [facility] by [facility]ons [facility] at [facility] OR    ENDOSCOPIC-RELEASE-CARPAL TUNNEL right Right 7/8/2014    [facility]    EXTRACORPOREAL SHOCK WAVE LITHOTRIPSY  2002    EXTRACTION-STONE-URETEROSCOPY Left 12/28/2016    [facility] at [facility] OR    HYSTERECTOMY  1984    KIDNEY STONE SURGERY      LITHOTRIPSY-LASER Left 12/28/2016    [facility] at [facility] OR    RELEASE-FINGER-TRIGGER right long finger Right 7/8/2014    [facility] Riley Rivera [facility]    THYROIDECTOMY  1977         TONSILLECTOMY, ADENOIDECTOMY      TRIGGER FINGER RELEASE      TUBAL LIGATION           FAMILY HISTORY:                Family History   Problem Relation Age of Onset    Cancer Father     Arthritis Father     Gout Father     Allergies Son     Allergic rhinitis Son     Skin cancer Mother     Macular degeneration Mother     Dementia Mother     Hypertension Mother     No Known Problems Daughter     Diabetes Daughter     No Known Problems Daughter     Glaucoma Maternal Aunt         Great Maternal  Aunt    Leukemia Maternal Uncle     Amblyopia Neg Hx     Cataracts Neg Hx     Retinal detachment Neg Hx     Strabismus Neg Hx     Stroke Neg Hx     Thyroid disease Neg Hx     Kidney disease Neg Hx     Angioedema Neg Hx     Asthma Neg Hx     Atopy Neg Hx     Eczema Neg Hx     Immunodeficiency Neg Hx     Rhinitis Neg Hx     Urticaria Neg Hx        SOCIAL HISTORY:          Tobacco:   Social History     Tobacco Use   Smoking Status Never Smoker   Smokeless Tobacco Never Used       alcohol use:    Social History     Substance and Sexual Activity   Alcohol Use No    Alcohol/week: 0.0 oz                 Occupation:     ALLERGIES:    Review of patient's allergies indicates:   Allergen Reactions    Sulfa (sulfonamide antibiotics) Nausea Only    Ciprofloxacin     Januvia [sitagliptin]      abd pain    Lisinopril     Lotensin [benazepril]     Nexium [esomeprazole magnesium] Other (See Comments)     Gas       CURRENT MEDICATIONS:    Current Outpatient Medications   Medication Sig Dispense Refill    amLODIPine (NORVASC) 5 MG tablet Take 1 tablet (5 mg total) by mouth once daily. 90 tablet 3    ascorbic acid, vitamin C, (VITAMIN C) 500 mg Chew       aspirin (ECOTRIN) 81 MG EC tablet Take 81 mg by mouth once daily.      azelastine (ASTELIN) 137 mcg (0.1 %) nasal spray 2 sprays (274 mcg total) by Nasal route 2 (two) times daily. 90 mL 4    biotin 10,000 mcg Cap Take by mouth.      blood sugar diagnostic (TRUE METRIX GLUCOSE TEST STRIP) Strp TEST TWO TIMES DAILY 200 strip 6    blood-glucose meter Misc [employer] True Metrix Air meter 1 each 0    calcium carbonate-vitamin D3 600 mg(1,500mg) -100 unit Cap Take 1 tablet by mouth once daily.      chlorthalidone (HYGROTEN) 25 MG Tab Take 1 tablet (25 mg total) by mouth once daily. 90 tablet 1    econazole nitrate 1 % cream Mix with hydrocortisone cream 1% 30 grams use bid prn 60 g 3    fluticasone (FLONASE) 50 mcg/actuation nasal spray 2 sprays (100 mcg  "total) by Each Nare route once daily. 48 g 4    gabapentin (NEURONTIN) 300 MG capsule Take 2 capsules (600 mg total) by mouth every evening. 180 capsule 1    glucagon (human recombinant) inj 1mg/mL kit Inject 1 mL (1 mg total) into the muscle as needed. 1 kit 6    insulin detemir U-100 (LEVEMIR FLEXTOUCH U-100 INSULN) 100 unit/mL (3 mL) SubQ InPn pen Inject 16 Units into the skin every evening. 1 Box 6    ketotifen (ZADITOR) 0.025 % (0.035 %) ophthalmic solution Place 1-2 drops into both eyes once daily.      L.acid-B.bifidum-B.animal-FOS (PROBIOTIC COMPLEX) 25 billion cell -100 mg Cap Take 1 capsule by mouth once daily.      lancets (TRUEPLUS LANCETS) 28 gauge Misc Inject 1 lancet into the skin 2 (two) times daily before meals. 200 each 6    levothyroxine (SYNTHROID) 75 MCG tablet TAKE 1 TABLET EVERY DAY 90 tablet 3    liraglutide 0.6 mg/0.1 mL, 18 mg/3 mL, subq PNIJ (VICTOZA 3-TAWANA) 0.6 mg/0.1 mL (18 mg/3 mL) PnIj INJECT 1.8 MG INTO THE SKIN ONCE DAILY. 27 mL 11    loratadine (CLARITIN) 10 mg tablet Take 10 mg by mouth once daily.      LORazepam (ATIVAN) 0.5 MG tablet TK 1 T PO 1 HOUR B MRI  0    lovastatin (MEVACOR) 40 MG tablet TAKE 1 TABLET NIGHTLY (SUBSTITUTED FOR MEVACOR) 90 tablet 1    magnesium oxide-Mg AA chelate (MAGNESIUM, AMINO ACID CHELATE,) 133 mg Tab Take by mouth as directed.       metFORMIN (GLUCOPHAGE) 1000 MG tablet Take 1 tablet (1,000 mg total) by mouth 2 (two) times daily with meals. 180 tablet 3    omeprazole (PRILOSEC) 20 MG capsule Take 1 capsule (20 mg total) by mouth daily as needed. 90 capsule 1    pen needle, diabetic (BD ULTRA-FINE EDUARDO PEN NEEDLES) 32 gauge x 5/32" Ndle USE WITH VICTOZA AND LEVEMIR (2 INJECTIONS EVERY DAY) 100 each 3    potassium chloride SA (K-DUR,KLOR-CON) 10 MEQ tablet Take 10 meq PO QD except on Mondays, Wednesdays and Fridays, on which she should take 2 tabs (20 meq)  tablet 1    PREMARIN vaginal cream Place 0.5 g vaginally 3 (three) times a " "week. 30 g 3    repaglinide (PRANDIN) 2 MG tablet Take 1 tablet (2 mg total) by mouth 3 (three) times daily before meals. 270 tablet 3    valACYclovir (VALTREX) 500 MG tablet Take 1 tablet (500 mg total) by mouth once daily. 90 tablet 1    valsartan (DIOVAN) 160 MG tablet Take 1 tablet (160 mg total) by mouth 2 (two) times daily. 180 tablet 1    diclofenac sodium (VOLTAREN) 1 % Gel Apply 2 g topically 4 (four) times daily. 3 Tube 3     Current Facility-Administered Medications   Medication Dose Route Frequency Provider Last Rate Last Dose    Allergy Mix   Subcutaneous 1 time in Clinic/HOD Ailin Mills MD                          REVIEW OF SYSTEMS:   Sleep related symptoms as per HPI    reports weight gain - 50 lbs since last titration  Denies dyspnea  Denies palpitations  Denies acid reflux   Reports occasional polyuria  Reports  mood diturbance  Denies  anemia  Reports  muscle pain - joint stiffness due to arthritis    Otherwise, a balance of 10 systems reviewed is negative.    PHYSICAL EXAM:  BP (!) 145/70   Pulse 80   Ht 5' 2" (1.575 m)   Wt 111.1 kg (245 lb)   LMP  (LMP Unknown)   BMI 44.81 kg/m²   GENERAL: Overweight body habitus, well groomed.  HEENT:   HEENT:  Conjunctivae are non-erythematous; Pupils equal, round, and reactive to light; Nose is symmetrical; Nasal mucosa is pink and moist; Nasal airflow is diminished on the right; Posterior pharynx is pink; Modified Mallampati:IV; Posterior palate is low; Tonsils absent; Uvula is elongated;Tongue is broad; Dentition is fair; No TMJ tenderness; Jaw opening and protrusion without click and without discomfort.  NECK: Supple. Neck circumference is 17 inches. No thyromegaly. No palpable nodes.     SKIN: On face and neck: No abrasions, no rashes, no lesions.  No subcutaneous nodules are palpable.  RESPIRATORY: Chest is clear to auscultation.  Normal chest expansion and non-labored breathing at rest.  CARDIOVASCULAR: Normal S1, S2.  No murmurs, " "gallops or rubs. No carotid bruits bilaterally.  No edema. No clubbing. No cyanosis.    NEURO: Oriented to time, place and person. Normal attention span and concentration. Gait normal.    PSYCH: Affect is full. Mood is normal  MUSCULOSKELETAL: Moves 4 extremities. Gait normal.       ASSESSMENT:    1.Previously diagnosed severe  BAM (obstructive sleep apnea). The patient symptomatically has  excessive daytime sleepiness, snoring,  witnessed breathing pauses, excessive daytime fatigue, gasping for air in sleep and interrupted sleep  with exam findings of "a crowded oral airway and elevated body mass index. The patient has medical co-morbidities of CAD, diabetes, heart disease and hypertension,  which can be worsened by BAM. This warrants treatment. Tolerates 12 cm H2O well on her new Resmed machine. CPAP 12-13 cm was shown to be adequate on recent re-titration.  Good AHI on download, but significant afternoon sleepiness. Gabapentin 600 mg at bedtime may also be contributing. Labs were reviewed. SaO2 still at low 90s at best - improving with higher CPAP settings. +++ dry mouth.        PLAN:      I have increased CPAP to 14.5 cm H2O.  If no improvement on EDS - will consider BPAP (concerned with the possibility of obesity-hypoventilation)  Will order heated hose so she can increase humidity.      ------------------------------  I discussed trial of Nuvigil for residual sleepiness - side effects (BP elevation, possible cardiac) were discussed. She was recommended to get a BP monometer and journal her BP.   50 mg Nuvigil was prescribed. She may try 1/2 pill for the first time.      More than 25 minutes of this 45 minutes visit was spent in counseling: during our discussion today, we talked about the etiology of BAM as well as the potential ramifications of untreated sleep apnea, which could include daytime sleepiness, hypertension, heart disease and/or stroke.  We discussed potential treatment options, which could include " weight loss, body positioning, continuous positive airway pressure (CPAP), or referral for surgical consideration. Meanwhile, she  is urged to avoid supine sleep, weight gain and alcoholic beverages since all of these can worsen BAM.     Precautions: The patient was advised to abstain from driving should he feel sleepy or drowsy.    Follow up: MD/NP  1 month to re-evaluate EDS after increasing CPAP and possibly the trial of Nuvigil.     Thank you for allowing me the opportunity to participate in the care of your patient.

## 2018-12-11 ENCOUNTER — PATIENT OUTREACH (OUTPATIENT)
Dept: OTHER | Facility: OTHER | Age: 68
End: 2018-12-11

## 2018-12-11 NOTE — PROGRESS NOTES
Last 5 Patient Entered Readings                                      Current 30 Day Average: 139/72     Recent Readings 12/10/2018 12/10/2018 12/9/2018 12/9/2018 12/8/2018    SBP (mmHg) 147 147 144 144 134    DBP (mmHg) 75 75 69 69 67    Pulse 65 65 70 70 69          Last 6 Patient Entered Readings                                          Most Recent A1c: 7.7% on 11/12/2018  (Goal: 7%)     Recent Readings 12/11/2018 12/10/2018 12/10/2018 12/9/2018 12/9/2018    Blood Glucose (mg/dL) 167 176 191 145 171        Patient unavailable. Will call back tomorrow.

## 2018-12-12 ENCOUNTER — PATIENT OUTREACH (OUTPATIENT)
Dept: OTHER | Facility: OTHER | Age: 68
End: 2018-12-12

## 2018-12-12 DIAGNOSIS — N18.30 TYPE 2 DIABETES MELLITUS WITH STAGE 3 CHRONIC KIDNEY DISEASE, WITH LONG-TERM CURRENT USE OF INSULIN: Primary | ICD-10-CM

## 2018-12-12 DIAGNOSIS — Z79.4 TYPE 2 DIABETES MELLITUS WITH STAGE 3 CHRONIC KIDNEY DISEASE, WITH LONG-TERM CURRENT USE OF INSULIN: Primary | ICD-10-CM

## 2018-12-12 DIAGNOSIS — E11.22 TYPE 2 DIABETES MELLITUS WITH STAGE 3 CHRONIC KIDNEY DISEASE, WITH LONG-TERM CURRENT USE OF INSULIN: Primary | ICD-10-CM

## 2018-12-12 NOTE — PROGRESS NOTES
Last 5 Patient Entered Readings                                      Current 30 Day Average: 139/72     Recent Readings 12/11/2018 12/11/2018 12/10/2018 12/10/2018 12/9/2018    SBP (mmHg) 134 134 147 147 144    DBP (mmHg) 67 67 75 75 69    Pulse 74 74 65 65 70          Last 6 Patient Entered Readings                                          Most Recent A1c: 7.7% on 11/12/2018  (Goal: 7%)     Recent Readings 12/12/2018 12/11/2018 12/11/2018 12/10/2018 12/10/2018    Blood Glucose (mg/dL) 192 246 167 176 191        Digital Medicine: Health  Follow Up    Left voicemail to follow up with . Vivienne GARCÍA Manjeetad.  Current BP average 139/72 mmHg is not at goal, <130/80 mmHg.  Insulin increased today. Will discuss diabetic diet.

## 2018-12-12 NOTE — PROGRESS NOTES
"Last 5 Patient Entered Readings                                      Current 30 Day Average: 139/72     Recent Readings 12/11/2018 12/11/2018 12/10/2018 12/10/2018 12/9/2018    SBP (mmHg) 134 134 147 147 144    DBP (mmHg) 67 67 75 75 69    Pulse 74 74 65 65 70        Patient's BP average is above goal of <130/80.     Mrs. Jose's BP has started to trend up. She is having knee pain and taking tylenol and naproxen for it. Explained that pain can cause her BP to be higher. Also explained that naproxen and other NSAIDs can cause higher BP and reduce the efficacy of chlorthalidone. She will try to avoid taking naproxen unless she "really needs it."     Will continue to monitor regularly. Will follow up in 2-3 weeks, sooner if BP begins to trend upward or downward.    Patient has my contact information and knows to call with any concerns or clinical changes.     Current HTN regimen:  Hypertension Medications             amLODIPine (NORVASC) 5 MG tablet Take 1 tablet (5 mg total) by mouth once daily.    chlorthalidone (HYGROTEN) 25 MG Tab Take 1 tablet (25 mg total) by mouth once daily.    valsartan (DIOVAN) 160 MG tablet Take 1 tablet (160 mg total) by mouth 2 (two) times daily.              Last 6 Patient Entered Readings                                          Most Recent A1c: 7.7% on 11/12/2018  (Goal: 7%)     Recent Readings 12/12/2018 12/11/2018 12/11/2018 12/10/2018 12/10/2018    Blood Glucose (mg/dL) 192 246 167 176 191        Called patient to introduce her to the Diabetes Digital Medicine Program.     Mrs. Jose is newly enrolled in DD. A1C last month was above goal. Mrs. Jose is not following a diabetic diet. She states "my sugars are higher but my diet has been the same for a while." Explained that dietary changes are important because her body can become more resistant to insulin causing her BG to be more elevated. Her FPG are all above 130 mg/dL. Will increase levemir to 18 units QPM. Asked that she " continue to check FPG and start checking readings 2 hours after dinner, or 4+ hours after dinner and closer to bedtime. Asked that she vary the times she checks her blood sugar.     Health , Maria Luisa, will review carb counting and diabetic diet with Mrs. Jose.    Reviewed patient's medications and verified allergies on file.   Diabetes Medications             glucagon (human recombinant) inj 1mg/mL kit Inject 1 mL (1 mg total) into the muscle as needed.    insulin detemir U-100 (LEVEMIR FLEXTOUCH U-100 INSULN) 100 unit/mL (3 mL) SubQ InPn pen Inject 18 Units into the skin every evening.    liraglutide 0.6 mg/0.1 mL, 18 mg/3 mL, subq PNIJ (VICTOZA 3-TAWANA) 0.6 mg/0.1 mL (18 mg/3 mL) PnIj INJECT 1.8 MG INTO THE SKIN ONCE DAILY.    metFORMIN (GLUCOPHAGE) 1000 MG tablet Take 1 tablet (1,000 mg total) by mouth 2 (two) times daily with meals.    repaglinide (PRANDIN) 2 MG tablet Take 1 tablet (2 mg total) by mouth 3 (three) times daily before meals.        Reviewed A1C goal of <7%.     Patient has no other questions or concerns at this time. Will continue to monitor and follow up in 2-3 weeks, sooner if she submits any concerning blood glucose readings.

## 2018-12-13 ENCOUNTER — OFFICE VISIT (OUTPATIENT)
Dept: ORTHOPEDICS | Facility: CLINIC | Age: 68
End: 2018-12-13
Payer: MEDICARE

## 2018-12-13 ENCOUNTER — OFFICE VISIT (OUTPATIENT)
Dept: ALLERGY | Facility: CLINIC | Age: 68
End: 2018-12-13
Payer: MEDICARE

## 2018-12-13 VITALS
HEIGHT: 62 IN | WEIGHT: 244.94 LBS | DIASTOLIC BLOOD PRESSURE: 82 MMHG | SYSTOLIC BLOOD PRESSURE: 142 MMHG | BODY MASS INDEX: 45.07 KG/M2

## 2018-12-13 VITALS — WEIGHT: 245 LBS | HEIGHT: 62 IN | BODY MASS INDEX: 45.08 KG/M2

## 2018-12-13 DIAGNOSIS — Z51.6 ALLERGY DESENSITIZATION THERAPY: ICD-10-CM

## 2018-12-13 DIAGNOSIS — M54.32 SCIATICA OF LEFT SIDE: Primary | ICD-10-CM

## 2018-12-13 DIAGNOSIS — J30.9 CHRONIC ALLERGIC RHINITIS: Primary | ICD-10-CM

## 2018-12-13 DIAGNOSIS — J30.81 CHRONIC ALLERGIC RHINITIS DUE TO ANIMAL HAIR AND DANDER: ICD-10-CM

## 2018-12-13 DIAGNOSIS — H10.13 ALLERGIC CONJUNCTIVITIS, BILATERAL: ICD-10-CM

## 2018-12-13 DIAGNOSIS — E11.42 DIABETIC POLYNEUROPATHY ASSOCIATED WITH TYPE 2 DIABETES MELLITUS: ICD-10-CM

## 2018-12-13 DIAGNOSIS — G62.9 NEUROPATHY: ICD-10-CM

## 2018-12-13 DIAGNOSIS — J30.89 ALLERGIC RHINITIS DUE TO DUST MITE: ICD-10-CM

## 2018-12-13 PROCEDURE — 1101F PT FALLS ASSESS-DOCD LE1/YR: CPT | Mod: CPTII,HCNC,S$GLB, | Performed by: ORTHOPAEDIC SURGERY

## 2018-12-13 PROCEDURE — 3077F SYST BP >= 140 MM HG: CPT | Mod: CPTII,HCNC,S$GLB, | Performed by: ALLERGY & IMMUNOLOGY

## 2018-12-13 PROCEDURE — 99999 PR PBB SHADOW E&M-EST. PATIENT-LVL III: CPT | Mod: PBBFAC,HCNC,, | Performed by: ORTHOPAEDIC SURGERY

## 2018-12-13 PROCEDURE — 99214 OFFICE O/P EST MOD 30 MIN: CPT | Mod: HCNC,S$GLB,, | Performed by: ORTHOPAEDIC SURGERY

## 2018-12-13 PROCEDURE — 99214 OFFICE O/P EST MOD 30 MIN: CPT | Mod: HCNC,S$GLB,, | Performed by: ALLERGY & IMMUNOLOGY

## 2018-12-13 PROCEDURE — 99999 PR PBB SHADOW E&M-EST. PATIENT-LVL III: CPT | Mod: PBBFAC,HCNC,, | Performed by: ALLERGY & IMMUNOLOGY

## 2018-12-13 PROCEDURE — 1101F PT FALLS ASSESS-DOCD LE1/YR: CPT | Mod: CPTII,HCNC,S$GLB, | Performed by: ALLERGY & IMMUNOLOGY

## 2018-12-13 PROCEDURE — 3079F DIAST BP 80-89 MM HG: CPT | Mod: CPTII,HCNC,S$GLB, | Performed by: ALLERGY & IMMUNOLOGY

## 2018-12-13 PROCEDURE — 3045F PR MOST RECENT HEMOGLOBIN A1C LEVEL 7.0-9.0%: CPT | Mod: CPTII,HCNC,S$GLB, | Performed by: ORTHOPAEDIC SURGERY

## 2018-12-13 RX ORDER — AZELASTINE 1 MG/ML
2 SPRAY, METERED NASAL 2 TIMES DAILY
Qty: 90 ML | Refills: 4 | Status: SHIPPED | OUTPATIENT
Start: 2018-12-13 | End: 2020-01-04 | Stop reason: SDUPTHER

## 2018-12-13 RX ORDER — GABAPENTIN 300 MG/1
600 CAPSULE ORAL 2 TIMES DAILY
Qty: 180 CAPSULE | Refills: 1 | Status: SHIPPED | OUTPATIENT
Start: 2018-12-13 | End: 2019-04-15 | Stop reason: SDUPTHER

## 2018-12-13 RX ORDER — FLUTICASONE PROPIONATE 50 MCG
2 SPRAY, SUSPENSION (ML) NASAL DAILY
Qty: 48 G | Refills: 4 | Status: SHIPPED | OUTPATIENT
Start: 2018-12-13 | End: 2020-01-04 | Stop reason: SDUPTHER

## 2018-12-13 NOTE — PROGRESS NOTES
Subjective:       Patient ID: Vivienne Jose is a 68 y.o. female.    Chief Complaint:  Follow-up (allergic rhinitis )      HPI Comments: 68 year-old woman presents for continued evaluation of chronic allergic rhinitis and conjunctivitis on IT.  She was last seen 1/12/18. In 2012 she had skin test with 4+ cat and dust mites and 1+ willow tree.  She was started on Claritin daily and fluticasone and Zaditor drops in morning.  She did ok on this but had flares so added azelastine 2 SEN BID as needed.  She would still  get sneeze, runny nose, sore throat, dry itchy eyes, stuffy nose and PND.  She started allergy shots 7/2015. She reached maintenance about 12/2015. She was off for several months 2017 due to ordering issues. She is on maintenance, vial 1:1 0.5cc. Gets 1 shots C, Dm, TR in left arm. She has done much better with shots, feels shots really help. However one time in Oct and one in NOV she had flare with very itchy eyes and increased sneeze. She thinks may be coming every 4 weeks may be too far and was better with every 3 weeks. No sinus infections. She has occ sneeze fits but minimal runny nose or congestion. She is on Flonase 2 SEN in AM, azelastine 1 SEN in PM, Zaditor 1 drop each eye in AM and Claritin at night.  She has had no reactions to shots, no itch, no hives, no swelling, no SOB, no wheeze, no dizziness.  She does have DM covers on all bedding.  She has a dog at home but no cats.      No CAD, she had angiogram and told mild blockage but no stents.  She is on ACE-I and ca channel blocker for HTN.      Prior history: new patient evaluation of allergies.  She used to see Dr Weber and was on allergy shots from [id number].  She states they worked great and all symptoms resolved.  However over last year she has felt symptoms returning.  Her eyes itch intensely and are dry at times and water at times.  She has runny nose only when eating otherwise has some sinus pressure and PND>  No stuffy nose.  She sneezes  a few times each morning.  She has always been worse around cats but does not have one.  Occ has chest congestion but no asthma.  NO eczema.  No food allergy.  She was worse few months ago otherwise not sure of season.  No difference nay time of day, no diff inside or out.  Claritin prn helps but makes her sleepy.  Using Zaditor with minimal relief.        Environmental History: see history    Review of Systems   Constitutional: Negative for fever, chills, appetite change and fatigue.   HENT: Positive for rhinorrhea, sneezing and postnasal drip. Negative for ear pain, nosebleeds, congestion, sore throat, facial swelling, trouble swallowing, voice change, sinus pressure and ear discharge.    Eyes: Positive for discharge and itching. Negative for redness and visual disturbance.   Respiratory: Negative for cough, choking, chest tightness, shortness of breath and wheezing.    Cardiovascular: Negative for chest pain, palpitations and leg swelling.   Gastrointestinal: Negative for nausea, vomiting, abdominal pain, diarrhea, constipation and abdominal distention.   Genitourinary: Negative for difficulty urinating.   Musculoskeletal: Negative for myalgias, joint swelling, arthralgias and gait problem.   Skin: Negative for color change and rash.   Neurological: Negative for dizziness, syncope, weakness, light-headedness and headaches.   Hematological: Negative for adenopathy. Does not bruise/bleed easily.   Psychiatric/Behavioral: Negative for behavioral problems, confusion, disturbed wake/sleep cycle and agitation. The patient is not nervous/anxious.         Objective:    Physical Exam   Nursing note and vitals reviewed.  Constitutional: She is oriented to person, place, and time. She appears well-developed and well-nourished. No distress.   HENT:   Head: Normocephalic and atraumatic.   Right Ear: Hearing, tympanic membrane, external ear and ear canal normal.   Left Ear: Hearing, tympanic membrane, external ear and ear  canal normal.   Nose: No mucosal edema, rhinorrhea, sinus tenderness or septal deviation. No epistaxis. Right sinus exhibits no maxillary sinus tenderness and no frontal sinus tenderness. Left sinus exhibits no maxillary sinus tenderness and no frontal sinus tenderness.   Mouth/Throat: Uvula is midline, oropharynx is clear and moist and mucous membranes are normal. No uvula swelling.   Eyes: Conjunctivae are normal. Right eye exhibits no discharge. Left eye exhibits no discharge.   Neck: Normal range of motion. No thyromegaly present.   Cardiovascular: Normal rate, regular rhythm and normal heart sounds.    No murmur heard.  Pulmonary/Chest: Effort normal and breath sounds normal. No respiratory distress. She has no wheezes.   Abdominal: Soft. She exhibits no distension. There is no tenderness.   Musculoskeletal: Normal range of motion. She exhibits no edema and no tenderness.   Lymphadenopathy:     She has no cervical adenopathy.   Neurological: She is alert and oriented to person, place, and time.   Skin: Skin is warm and dry. No rash noted. No erythema.   Psychiatric: She has a normal mood and affect. Her behavior is normal. Judgment and thought content normal.       Laboratory:   Percutaneous Skin Testing: Inhalants- 4+ dust mites, 3+ cat, 1+ willow with 3+ histamine control and remainder all negative  Assessment:       1. Chronic allergic rhinitis    2. Allergic rhinitis due to dust mite    3. Allergic conjunctivitis, bilateral    4. Allergy desensitization therapy         Plan:       1. Dust mite avoidance, measures discussed and handout provided.   2. Continue Flonase 2 SEN daily  3. Continue Claritin 10 mg daily  4. Zaditor prn  5. Continue azelastine 1 SEN nightly and increase to BID as needed  6. continue immunotherapy, vial 1:1 0.5cc but change to every 3 weeks. Patient voiced understanding and agreed.    Patient advised to remain off beta blockers while on allergy shots and to notify injection clinic and  MD of any new medications starts.

## 2018-12-13 NOTE — PROGRESS NOTES
Subjective:      Patient ID: Vivienne Jose is a 68 y.o. female.    Chief Complaint: No chief complaint on file.      HPI: Vivienne Jose is here in follow-up.  She was previously seen for right knee osteoarthritis and treated with Synvisc injections 3 months ago.  Since then she was also seen for left-sided sciatica and treated with Medrol Dosepak and physical therapy with the [facility] program.  Patient states the knee pain was improved after Synvisc injections and back pain/sciatic symptoms are improving with physical therapy.  She reports excellent symptomatic relief with dry needling.      Today, the patient is here with new complaints.  She reports waking up 1 week ago with constant burning pain at the medial knee that is aggravated with activity and light touch. Pain radiates across her lower leg and into the lateral calf and ankle. Pain is occasionally achy but mostly burning and stabbing in nature.  She denies any recent history of injury or aggravation.  She has not tried anything for treatment. Pain is limiting ambulation and ADLs.    Vivienne Jose does have a history of diabetes (last A1c 7.7) and currently on a gabapentin regimen of 600 mg q.h.s..     Past Medical History:   Diagnosis Date    Allergy     Angio-edema     Arthritis     Cataract     Cerebrovascular malformation     Colon polyps     Coronary artery disease     Diabetes mellitus, type 2     Diabetic peripheral neuropathy     GERD (gastroesophageal reflux disease)     Herpes infection     Hyperlipidemia     Hypertension     Hypothyroidism     Kidney stones     Nausea & vomiting 11/23/2015    Obesity, morbid     Ovarian cyst     Pyelonephritis, chronic     Seizure disorder, focal motor     Sleep apnea     Type II or unspecified type diabetes mellitus with neurological manifestations, uncontrolled(250.62)     Urinary tract infection     Urticaria     Vaginal infection        Current Outpatient Medications:      amLODIPine (NORVASC) 5 MG tablet, Take 1 tablet (5 mg total) by mouth once daily., Disp: 90 tablet, Rfl: 3    ascorbic acid, vitamin C, (VITAMIN C) 500 mg Chew, , Disp: , Rfl:     aspirin (ECOTRIN) 81 MG EC tablet, Take 81 mg by mouth once daily., Disp: , Rfl:     azelastine (ASTELIN) 137 mcg (0.1 %) nasal spray, 2 sprays (274 mcg total) by Nasal route 2 (two) times daily., Disp: 90 mL, Rfl: 4    biotin 10,000 mcg Cap, Take by mouth., Disp: , Rfl:     blood sugar diagnostic (TRUE METRIX GLUCOSE TEST STRIP) Strp, TEST TWO TIMES DAILY, Disp: 200 strip, Rfl: 6    blood-glucose meter Misc, [employer] True Metrix Air meter, Disp: 1 each, Rfl: 0    calcium carbonate-vitamin D3 600 mg(1,500mg) -100 unit Cap, Take 1 tablet by mouth once daily., Disp: , Rfl:     chlorthalidone (HYGROTEN) 25 MG Tab, Take 1 tablet (25 mg total) by mouth once daily., Disp: 90 tablet, Rfl: 1    econazole nitrate 1 % cream, Mix with hydrocortisone cream 1% 30 grams use bid prn, Disp: 60 g, Rfl: 3    fluticasone (FLONASE) 50 mcg/actuation nasal spray, 2 sprays (100 mcg total) by Each Nare route once daily., Disp: 48 g, Rfl: 4    gabapentin (NEURONTIN) 300 MG capsule, Take 2 capsules (600 mg total) by mouth 2 (two) times daily., Disp: 180 capsule, Rfl: 1    glucagon (human recombinant) inj 1mg/mL kit, Inject 1 mL (1 mg total) into the muscle as needed., Disp: 1 kit, Rfl: 6    insulin detemir U-100 (LEVEMIR FLEXTOUCH U-100 INSULN) 100 unit/mL (3 mL) SubQ InPn pen, Inject 18 Units into the skin every evening., Disp: 1 Box, Rfl: 6    ketotifen (ZADITOR) 0.025 % (0.035 %) ophthalmic solution, Place 1-2 drops into both eyes once daily., Disp: , Rfl:     L.acid-B.bifidum-B.animal-FOS (PROBIOTIC COMPLEX) 25 billion cell -100 mg Cap, Take 1 capsule by mouth once daily., Disp: , Rfl:     lancets (TRUEPLUS LANCETS) 28 gauge Misc, Inject 1 lancet into the skin 2 (two) times daily before meals., Disp: 200 each, Rfl: 6    levothyroxine (SYNTHROID)  "75 MCG tablet, TAKE 1 TABLET EVERY DAY, Disp: 90 tablet, Rfl: 3    liraglutide 0.6 mg/0.1 mL, 18 mg/3 mL, subq PNIJ (VICTOZA 3-TAWANA) 0.6 mg/0.1 mL (18 mg/3 mL) PnIj, INJECT 1.8 MG INTO THE SKIN ONCE DAILY., Disp: 27 mL, Rfl: 11    loratadine (CLARITIN) 10 mg tablet, Take 10 mg by mouth once daily., Disp: , Rfl:     LORazepam (ATIVAN) 0.5 MG tablet, TK 1 T PO 1 HOUR B MRI, Disp: , Rfl: 0    lovastatin (MEVACOR) 40 MG tablet, TAKE 1 TABLET NIGHTLY (SUBSTITUTED FOR MEVACOR), Disp: 90 tablet, Rfl: 1    magnesium oxide-Mg AA chelate (MAGNESIUM, AMINO ACID CHELATE,) 133 mg Tab, Take by mouth as directed. , Disp: , Rfl:     metFORMIN (GLUCOPHAGE) 1000 MG tablet, Take 1 tablet (1,000 mg total) by mouth 2 (two) times daily with meals., Disp: 180 tablet, Rfl: 3    omeprazole (PRILOSEC) 20 MG capsule, Take 1 capsule (20 mg total) by mouth daily as needed., Disp: 90 capsule, Rfl: 1    pen needle, diabetic (BD ULTRA-FINE EDUARDO PEN NEEDLES) 32 gauge x 5/32" Ndle, USE WITH VICTOZA AND LEVEMIR (2 INJECTIONS EVERY DAY), Disp: 100 each, Rfl: 3    potassium chloride SA (K-DUR,KLOR-CON) 10 MEQ tablet, Take 10 meq PO QD except on Mondays, Wednesdays and Fridays, on which she should take 2 tabs (20 meq) QD, Disp: 145 tablet, Rfl: 1    PREMARIN vaginal cream, Place 0.5 g vaginally 3 (three) times a week., Disp: 30 g, Rfl: 3    repaglinide (PRANDIN) 2 MG tablet, Take 1 tablet (2 mg total) by mouth 3 (three) times daily before meals., Disp: 270 tablet, Rfl: 3    valACYclovir (VALTREX) 500 MG tablet, Take 1 tablet (500 mg total) by mouth once daily., Disp: 90 tablet, Rfl: 1    valsartan (DIOVAN) 160 MG tablet, Take 1 tablet (160 mg total) by mouth 2 (two) times daily., Disp: 180 tablet, Rfl: 1    diclofenac sodium (VOLTAREN) 1 % Gel, Apply 2 g topically 4 (four) times daily., Disp: 3 Tube, Rfl: 3    Current Facility-Administered Medications:     Allergy Mix, , Subcutaneous, 1 time in Clinic/HOD, Ailin Mills MD  Review of " "patient's allergies indicates:   Allergen Reactions    Sulfa (sulfonamide antibiotics) Nausea Only    Ciprofloxacin     Januvia [sitagliptin]      abd pain    Lisinopril     Lotensin [benazepril]     Nexium [esomeprazole magnesium] Other (See Comments)     Gas       Ht 5' 2" (1.575 m)   Wt 111.1 kg (245 lb)   LMP  (LMP Unknown)   BMI 44.81 kg/m²     Review of Systems   Constitution: Negative for chills and fever.   Cardiovascular: Negative for chest pain and palpitations.   Respiratory: Negative for shortness of breath and wheezing.    Skin: Negative for poor wound healing and rash.   Musculoskeletal: Positive for joint pain and stiffness. Negative for joint swelling and muscle weakness.   Gastrointestinal: Negative for nausea and vomiting.   Genitourinary: Negative for dysuria and hematuria.   Neurological: Negative for seizures and tremors.        Burning pain and allodynia of the right lower extremity   Psychiatric/Behavioral: Negative for altered mental status.   Allergic/Immunologic: Negative for environmental allergies and persistent infections.         Objective:    Ortho Exam       Righ KNEE:  The patient walks with antalgic gait uses cane.  Resisted SLR negative.  The skin over the knee is intact. The skin is sensitive to light touch  Knee effusion none. Knee is not swollen or erythematous.  No true tenderness to palpation but pain is located medial and anterior knee, lateral calf and ankle.  Range of motion- ROM knee full but painful.  Ligament exam:              MCL intact              Lachman intact              Post sag intact              LCL intact  Patellar crepitation present.  Pulses DP present, PT present  Motor normal 5/5  strength in all tested muscle groups.   Sensory:  No decrease sensation to touch    GEN: Well developed, well nourished female. AAOX3. No acute distress.   Normocephalic, atraumatic.   SANCHEZ  Breathing unlabored.  Mood and affect appropriate.       Assessment: " "    Imaging:  MRI of the lumbar spine was reviewed which revealed mild spondylosis at L4 without central canal stenosis or foraminal narrowing.  X-rays of the right knee reviewed which reveal mild joint space narrowing of the right knee.        1. Sciatica of left side    2. Neuropathy    3. Diabetic polyneuropathy associated with type 2 diabetes mellitus Sub-optimally controlled         Plan:       Patient did very well with Synvisc injections.  She states knee pain is "a new pain" that is unlike her previous joint pain.   She also continues to do well with sciatic symptoms in the [facility] program.  I explained new symptoms are superficial nerve pain possibly secondary to worsening diabetic neuropathy or nerve entrapment.  For this, I recommended increasing gabapentin to 600 mg b.i.d. and neurology consult.   I explained in depth that I do not believe these symptoms are related to underlying mild OA of the right knee and cortisone injection would not be beneficial at this time.     Increased gabapentin as described.  Continue Tylenol.  Restart Voltaren.  Hold off on healthy back therapy for 1 weeks time.     Orders Placed This Encounter    Ambulatory consult to Neurology    gabapentin (NEURONTIN) 300 MG capsule     Follow-up in about 3 weeks (around 1/3/2019).        "

## 2018-12-13 NOTE — PROGRESS NOTES
"Last 5 Patient Entered Readings                                      Current 30 Day Average: 140/73     Recent Readings 12/12/2018 12/12/2018 12/12/2018 12/12/2018 12/11/2018    SBP (mmHg) 149 149 159 159 134    DBP (mmHg) 70 70 98 98 67    Pulse 74 74 74 74 74        Last 6 Patient Entered Readings                                          Most Recent A1c: 7.7% on 11/12/2018  (Goal: 7%)     Recent Readings 12/13/2018 12/12/2018 12/12/2018 12/11/2018 12/11/2018    Blood Glucose (mg/dL) 213 196 192 246 167        Digital Medicine: Health  Follow Up    Lifestyle Modifications:    1.Dietary Modifications (Sodium intake <2,000mg/day, food labels, dining out): Mrs. Jose has been eating out more often since her leg pain has been more severe. She states that she has been in the clinic a few times over the past few weeks, and "everyone always fusses at me about my weight". She states that since she won't be working as often, (again, because of leg pain) she wants to sit down and create a chadd plan to start cooking more at home. Since she is unable to move around as much, she is afraid of gaining weight. Will send additional resources for meal planning and portion control.    2.Physical Activity: Patient has not been exercising due to leg pain. She was seen in the clinic today by orthopedics, and states that they suspect neuropathy in the leg. She says that she can hardly stand on it, and hurts to the touch. She has also been using a cane for support.     3.Medication Therapy: Patient has been compliant with the medication regimen.    4.Patient has the following medication side effects/concerns: None  (Frequency/Alleviating factors/Precipitating factors, etc.)     Follow up with Mrs. Vivienne Jose completed. No further questions or concerns. Will continue to follow up to achieve health goals.    "

## 2018-12-18 ENCOUNTER — PATIENT MESSAGE (OUTPATIENT)
Dept: ADMINISTRATIVE | Facility: OTHER | Age: 68
End: 2018-12-18

## 2018-12-19 ENCOUNTER — OFFICE VISIT (OUTPATIENT)
Dept: CARDIOLOGY | Facility: CLINIC | Age: 68
End: 2018-12-19
Payer: MEDICARE

## 2018-12-19 ENCOUNTER — CLINICAL SUPPORT (OUTPATIENT)
Dept: REHABILITATION | Facility: OTHER | Age: 68
End: 2018-12-19
Attending: PHYSICAL MEDICINE & REHABILITATION
Payer: MEDICARE

## 2018-12-19 VITALS
SYSTOLIC BLOOD PRESSURE: 133 MMHG | HEIGHT: 63 IN | WEIGHT: 245.69 LBS | OXYGEN SATURATION: 96 % | BODY MASS INDEX: 43.53 KG/M2 | DIASTOLIC BLOOD PRESSURE: 69 MMHG | HEART RATE: 69 BPM

## 2018-12-19 DIAGNOSIS — G47.33 OSA (OBSTRUCTIVE SLEEP APNEA): ICD-10-CM

## 2018-12-19 DIAGNOSIS — R53.81 PHYSICAL DECONDITIONING: ICD-10-CM

## 2018-12-19 DIAGNOSIS — M54.42 CHRONIC LEFT-SIDED LOW BACK PAIN WITH LEFT-SIDED SCIATICA: Primary | ICD-10-CM

## 2018-12-19 DIAGNOSIS — E78.2 MIXED HYPERLIPIDEMIA: ICD-10-CM

## 2018-12-19 DIAGNOSIS — G89.29 CHRONIC LEFT-SIDED LOW BACK PAIN WITH LEFT-SIDED SCIATICA: Primary | ICD-10-CM

## 2018-12-19 DIAGNOSIS — E66.01 MORBID OBESITY WITH BODY MASS INDEX (BMI) OF 40.0 TO 44.9 IN ADULT: ICD-10-CM

## 2018-12-19 DIAGNOSIS — I25.10 CORONARY ARTERY DISEASE INVOLVING NATIVE CORONARY ARTERY OF NATIVE HEART WITHOUT ANGINA PECTORIS: Primary | ICD-10-CM

## 2018-12-19 DIAGNOSIS — E78.5 DYSLIPIDEMIA ASSOCIATED WITH TYPE 2 DIABETES MELLITUS: ICD-10-CM

## 2018-12-19 DIAGNOSIS — E11.69 DYSLIPIDEMIA ASSOCIATED WITH TYPE 2 DIABETES MELLITUS: ICD-10-CM

## 2018-12-19 DIAGNOSIS — I10 ESSENTIAL HYPERTENSION: ICD-10-CM

## 2018-12-19 PROCEDURE — 3075F SYST BP GE 130 - 139MM HG: CPT | Mod: CPTII,HCNC,S$GLB, | Performed by: INTERNAL MEDICINE

## 2018-12-19 PROCEDURE — 1101F PT FALLS ASSESS-DOCD LE1/YR: CPT | Mod: CPTII,HCNC,S$GLB, | Performed by: INTERNAL MEDICINE

## 2018-12-19 PROCEDURE — 3078F DIAST BP <80 MM HG: CPT | Mod: CPTII,HCNC,S$GLB, | Performed by: INTERNAL MEDICINE

## 2018-12-19 PROCEDURE — 99214 OFFICE O/P EST MOD 30 MIN: CPT | Mod: HCNC,S$GLB,, | Performed by: INTERNAL MEDICINE

## 2018-12-19 PROCEDURE — 97110 THERAPEUTIC EXERCISES: CPT | Mod: HCNC

## 2018-12-19 PROCEDURE — 3045F PR MOST RECENT HEMOGLOBIN A1C LEVEL 7.0-9.0%: CPT | Mod: CPTII,HCNC,S$GLB, | Performed by: INTERNAL MEDICINE

## 2018-12-19 PROCEDURE — 99999 PR PBB SHADOW E&M-EST. PATIENT-LVL III: CPT | Mod: PBBFAC,HCNC,, | Performed by: INTERNAL MEDICINE

## 2018-12-19 NOTE — PROGRESS NOTES
Subjective:   Patient ID:  Vivienne Jose is a 68 y.o. female who presents for follow-up of Follow-up (6 month)      Problem List Items Addressed This Visit        Cardiac/Vascular    HTN (hypertension)    CAD (coronary artery disease) - Primary    Dyslipidemia associated with type 2 diabetes mellitus    Hyperlipidemia       Endocrine    Morbid obesity with body mass index (BMI) of 40.0 to 44.9 in adult       Other    BAM (obstructive sleep apnea)    Physical deconditioning          HPI: Patient with the above medical problems is here for f/u. She is doing well. She denies chest pain or dyspnea. She has BAM and is compliant with CPAP. She occasionally have dizziness but no syncope. Still having occasional palpitations. She continues to have BRIONES. Weight is stable. She is not trying to lose weight. No orthopnea or PND. BP is controlled. She is compliant with medications.     PET stress negative for ischemia.     Review of Systems   Constitution: Negative.   HENT: Negative.    Eyes: Negative.    Cardiovascular: Negative.    Respiratory: Negative.    Endocrine: Negative.    Hematologic/Lymphatic: Negative.    Skin: Negative.    Musculoskeletal: Negative.    Gastrointestinal: Negative.    Neurological: Negative.         Medication List           Accurate as of 12/19/18  8:36 AM. If you have any questions, ask your nurse or doctor.               CONTINUE taking these medications    amLODIPine 5 MG tablet  Commonly known as:  NORVASC  Take 1 tablet (5 mg total) by mouth once daily.     aspirin 81 MG EC tablet  Commonly known as:  ECOTRIN     azelastine 137 mcg (0.1 %) nasal spray  Commonly known as:  ASTELIN  2 sprays (274 mcg total) by Nasal route 2 (two) times daily.     biotin 10,000 mcg Cap     blood sugar diagnostic Strp  Commonly known as:  TRUE METRIX GLUCOSE TEST STRIP  TEST TWO TIMES DAILY     blood-glucose meter Misc  [employer] True Metrix Air meter     calcium carbonate-vitamin D3 600 mg(1,500mg) -100 unit Cap    "  chlorthalidone 25 MG Tab  Commonly known as:  HYGROTEN  Take 1 tablet (25 mg total) by mouth once daily.     diclofenac sodium 1 % Gel  Commonly known as:  VOLTAREN  Apply 2 g topically 4 (four) times daily.     econazole nitrate 1 % cream  Mix with hydrocortisone cream 1% 30 grams use bid prn     fluticasone 50 mcg/actuation nasal spray  Commonly known as:  FLONASE  2 sprays (100 mcg total) by Each Nare route once daily.     gabapentin 300 MG capsule  Commonly known as:  NEURONTIN  Take 2 capsules (600 mg total) by mouth 2 (two) times daily.     glucagon (human recombinant) 1 mg Kit  Commonly known as:  GLUCAGON EMERGENCY KIT (HUMAN)  Inject 1 mL (1 mg total) into the muscle as needed.     insulin detemir U-100 100 unit/mL (3 mL) Inpn pen  Commonly known as:  LEVEMIR FLEXTOUCH U-100 INSULN  Inject 18 Units into the skin every evening.     ketotifen 0.025 % (0.035 %) ophthalmic solution  Commonly known as:  ZADITOR     lancets 28 gauge Misc  Commonly known as:  TRUEPLUS LANCETS  Inject 1 lancet into the skin 2 (two) times daily before meals.     levothyroxine 75 MCG tablet  Commonly known as:  SYNTHROID  TAKE 1 TABLET EVERY DAY     liraglutide 0.6 mg/0.1 mL (18 mg/3 mL) subq PNIJ 0.6 mg/0.1 mL (18 mg/3 mL) Pnij  Commonly known as:  VICTOZA 3-TAWANA  INJECT 1.8 MG INTO THE SKIN ONCE DAILY.     loratadine 10 mg tablet  Commonly known as:  CLARITIN     LORazepam 0.5 MG tablet  Commonly known as:  ATIVAN     lovastatin 40 MG tablet  Commonly known as:  MEVACOR  TAKE 1 TABLET NIGHTLY (SUBSTITUTED FOR MEVACOR)     metFORMIN 1000 MG tablet  Commonly known as:  GLUCOPHAGE  Take 1 tablet (1,000 mg total) by mouth 2 (two) times daily with meals.     MG-PLUS-PROTEIN 133 mg Tab  Generic drug:  magnesium oxide-Mg AA chelate     omeprazole 20 MG capsule  Commonly known as:  PRILOSEC  Take 1 capsule (20 mg total) by mouth daily as needed.     pen needle, diabetic 32 gauge x 5/32" Ndle  Commonly known as:  BD ULTRA-FINE EDUARDO PEN " NEEDLE  USE WITH VICTOZA AND LEVEMIR (2 INJECTIONS EVERY DAY)     potassium chloride SA 10 MEQ tablet  Commonly known as:  K-DUR,KLOR-CON  Take 10 meq PO QD except on Mondays, Wednesdays and Fridays, on which she should take 2 tabs (20 meq) QD     PREMARIN vaginal cream  Generic drug:  conjugated estrogens  Place 0.5 g vaginally 3 (three) times a week.     PROBIOTIC COMPLEX 25 billion cell -100 mg Cap  Generic drug:  L.acid-B.bifidum-B.animal-FOS     repaglinide 2 MG tablet  Commonly known as:  PRANDIN  Take 1 tablet (2 mg total) by mouth 3 (three) times daily before meals.     valACYclovir 500 MG tablet  Commonly known as:  VALTREX  Take 1 tablet (500 mg total) by mouth once daily.     valsartan 160 MG tablet  Commonly known as:  DIOVAN  Take 1 tablet (160 mg total) by mouth 2 (two) times daily.     VITAMIN C 500 mg Chew  Generic drug:  ascorbic acid (vitamin C)            Objective:   Physical Exam   Constitutional: She is oriented to person, place, and time. She appears well-developed and well-nourished. No distress.   Examination of the digits showed no clubbing or cyanosis   HENT:   Head: Normocephalic and atraumatic.   Eyes: Conjunctivae are normal. Pupils are equal, round, and reactive to light. Right eye exhibits no discharge.   Neck: Normal range of motion. Neck supple. No JVD present. No thyromegaly present.   No carotid bruits   Cardiovascular: Normal rate, regular rhythm, S1 normal, S2 normal, normal heart sounds, intact distal pulses and normal pulses. PMI is not displaced. Exam reveals no gallop, no friction rub and no opening snap.   No murmur heard.  Pulmonary/Chest: Effort normal and breath sounds normal. No respiratory distress. She has no wheezes. She has no rales. She exhibits no tenderness.   Abdominal: Soft. Bowel sounds are normal. She exhibits no distension and no mass. There is no tenderness. There is no guarding.   No hepatosplenomegaly   Musculoskeletal: Normal range of motion. She  exhibits no edema or tenderness.   Trace edema   Lymphadenopathy:     She has no cervical adenopathy.   Neurological: She is alert and oriented to person, place, and time.   Skin: Skin is warm. No rash noted. She is not diaphoretic. No erythema.   Psychiatric: She has a normal mood and affect.   Nursing note and vitals reviewed.      ECGs reviewed-NSR with RBBB  LABS reviewed  Imaging including Echoes reviewed-60% with no DD    Assessment:     1. Coronary artery disease involving native coronary artery of native heart without angina pectoris    2. Essential hypertension    3. Mixed hyperlipidemia    4. Dyslipidemia associated with type 2 diabetes mellitus    5. Morbid obesity with body mass index (BMI) of 40.0 to 44.9 in adult    6. BAM (obstructive sleep apnea)    7. Physical deconditioning        Plan:   Patient doing well   Continue current medications  Low salt diet  Weight loss. Dietary changes discussed with patient, as explained to patient with normal echo and nuclear stress dyspnea is likely secondary to weight.   Increase activity as tolerated  F/u in 6 months.

## 2018-12-19 NOTE — PROGRESS NOTES
[facility]      Name: Vivienne Jose  Clinic Number: [id number]    Date of Treatment: 12/19/2018     Diagnosis:   Encounter Diagnosis   Name Primary?    Chronic left-sided low back pain with left-sided sciatica Yes     Physician: Amelia Antunez, *       Precautions: L knee OA/pain, HTN, diabetes     Pattern of pain determined: 2     Evaluation Date: 10/22/2018  Authorization Period Expiration: 12/31/18  Plan of Care Expiration: 1/21/18  Reassessment Due: 12/21/18    Visit # / Visits authorized: 13/20  (NB: No lap belt on machine)    Medicare charges: (198)    Time In: 2:00  Time Out: 3:00  Total Billable Time: 50 minutes     Subjective   Vivienne arrives to PT with R knee to ankle  pain due to her neuropathy. No pain with her LB.  She feels the main limited factor is her right knee pain which forces her to limp and makes her back hurt. She feels that she can walk longer with less pain.  She gets good relief with FDN for LB.     Patient reports their pain to be 0/10 on a 0-10 scale with 0 being no pain and 10 being the worst pain imaginable.  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documents   Leisure: computer,                      Pts goals:  To be able to walk further    Objective      MOVEMENT LOSS 11/21/18    ROM Loss   Flexion min loss, no curve reversal   Extension moderate loss   Side bending Right minimal loss   Side bending Left minimal loss   Rotation Right minimal loss   Rotation Left minimal loss       Baseline Isometric Testing on Med X equipment: Testing administered by PT  Date of testing: 10/22/18  ROM 0-30 deg (increased to 36-0 on 11/27/18)    Max Peak Torque 117    Min Peak Torque 25    Flex/Ext Ratio 4.68:1   % below normative data 18   Counter weight 241   femur 6   Seat pad 0               CMS Impairment/Limitation/Restriction for FOTO lumbar Survey     Therapist reviewed FOTO scores for Vivienne GARCÍA  Damon on 10/22/2018.   FOTO documents entered into [email or web address] - see Media section.     Limitation Score: 63%  Category: Mobility     Current : CL = least 60% but < 80% impaired, limited or restricted  Goal: CK = at least 40% but < 60% impaired, limited or restricted    Visit 10: 60%                  Treatment      Home Exercise Program as follows: see pt instructions for HEP. Seated flexion  Handouts were given to the patient. Pt demo good understanding of the education provided. Vivienne demonstrated good return demonstration of activities.        Vivienne received therapeutic exercises to develop/improve posture, lumbar  ROM, strength and muscular endurance for 60 minutes including the following exercises:       "[facility] [facility] Therapy 12/19/2018   Visit Number 13   VAS Pain Rating 0   Time 10   Flexion in Lying 10   Flexion in Sitting 10   Manual Therapy -   Lumbar Extension Seat Pad -   Femur Restraint -   Top Dead Center -   Counterweight -   Lumbar Flexion -   Lumbar Extension -   Lumbar Peak Torque -   Min Torque -   Test Percent Below Normative Data -   Test Percent Gain in Strength from Initial  -   Lumbar Weight 56   Repetitions 20   Rating of Perceived Exertion 3   Ice - Sitting 10     NB: possibly No lap belt  She only did seated and standing HEP due to R knee pain.   HEP:    Seated flexion: x 15  Standing flexion : x 10  Supine flexion: x 10 with ball  Standing extension x 10  Prone press ups x 10        NEUROMUSCULAR:   Taping R knee for pain and stability.None today    Assessment       Patient is making good progress towards established goals. Pt is slowly progressing and she is compliant with her HEP. She tolerated the lumbar machine with no c/o LBP, although did have R LE pain with LE machines due to her neuropathy, but she wanted to try. Good tolerance to weight increase to 56 ftlbs and able to complete 20 reps without difficulty.  Pt will continue to benefit from skilled outpatient physical therapy to address the  deficits stated in the impairment chart, provide pt/family education and to maximize pt's level of independence in the home and community environment.       Pt's spiritual, cultural and educational needs considered and pt agreeable to plan of care and goals as stated below:     Medical necessity is demonstrated by the following IMPAIRMENTS/PROBLEMS:    Pt presents with the following impairments:      History  Co-morbidities and personal factors that may impact the plan of care Co-morbidities:   CAD, diabetes, HTN and OA, knee pain, obesity     Personal Factors:   coping style       moderate   Examination  Body Structures and Functions, activity limitations and participation restrictions that may impact the plan of care Body Regions:   back  lower extremities     Body Systems:    ROM  strength  gait     Participation Restrictions:   Walking, standing, house work, cooking showering     Activity limitations:   Learning and applying knowledge  no deficits     General Tasks and Commands  no deficits     Communication  no deficits     Mobility  lifting and carrying objects  walking     Self care  washing oneself (bathing, drying, washing hands)     Domestic Life  shopping  cooking  doing house work (cleaning house, washing dishes, laundry)     Interactions/Relationships  no deficits     Life Areas  no deficits     Community and Social Life  no deficits             moderate   Clinical Presentation stable and uncomplicated low   Decision Making/ Complexity Score: moderate         GOALS: Pt is in agreement with the following goals.     Short term goals:  6 weeks or 10 visits   1.  Pt will demonstrate increased lumbar ROM by at least 3 degrees from the initial ROM value with improvements noted in functional ROM and ability to perform ADLs Met 11/27/18  2.  Pt will demonstrate increased maximum isometric torque value by 5% when compared to the initial value resulting in improved ability to perform bending, lifting, and  carrying activities safely, confidently. (Progressing)     3.  Patient report a reduction in worst pain score by 1-2 points for improved tolerance during work and recreational activities (Progressing)  4.  Pt able to perform HEP correctly with minimal cueing or supervision for therapist (Progressing)        Long term goals: 13 weeks or 20 visits   1. Pt will demonstrate increased lumbar ROM by at least 6 degrees from initial ROM value, resulting in improved ability to perform functional fwd bending while standing and sitting. Met 11/27/18  2. Pt will demonstrate increased maximum isometric torque value by 10% when compared to the initial value resulting in improved ability to perform bending, lifting, and carrying activities safely, confidently. (Progressing)  3. Pt to demonstrate ability to independently control and reduce their pain through posture positioning and mechanical movements throughout a typical day. (Progressing)  4.  Patient will demonstrate improved overall function per FOTO Survey to CK = at least 40% but < 60% impaired, limited or restricted score or less.  5. Pt to begin a progressive walking program in order to increase her physical activity for ADLs and exercising. (Progressing)    Plan   Continue with established Plan of Care towards established PT goals.       Madisyn Chatterjee, PTA  12/19/2018

## 2018-12-21 ENCOUNTER — CLINICAL SUPPORT (OUTPATIENT)
Dept: REHABILITATION | Facility: OTHER | Age: 68
End: 2018-12-21
Attending: PHYSICAL MEDICINE & REHABILITATION
Payer: MEDICARE

## 2018-12-21 ENCOUNTER — CLINICAL SUPPORT (OUTPATIENT)
Dept: INTERNAL MEDICINE | Facility: CLINIC | Age: 68
End: 2018-12-21
Payer: MEDICARE

## 2018-12-21 DIAGNOSIS — J30.9 CHRONIC ALLERGIC RHINITIS: ICD-10-CM

## 2018-12-21 DIAGNOSIS — G89.29 CHRONIC LEFT-SIDED LOW BACK PAIN WITH LEFT-SIDED SCIATICA: Primary | ICD-10-CM

## 2018-12-21 DIAGNOSIS — M54.42 CHRONIC LEFT-SIDED LOW BACK PAIN WITH LEFT-SIDED SCIATICA: Primary | ICD-10-CM

## 2018-12-21 PROCEDURE — 97110 THERAPEUTIC EXERCISES: CPT | Mod: HCNC

## 2018-12-21 PROCEDURE — 95115 IMMUNOTHERAPY ONE INJECTION: CPT | Mod: HCNC,S$GLB,, | Performed by: FAMILY MEDICINE

## 2018-12-21 PROCEDURE — 99499 UNLISTED E&M SERVICE: CPT | Mod: HCNC,S$GLB,, | Performed by: ALLERGY & IMMUNOLOGY

## 2018-12-21 NOTE — PROGRESS NOTES
[facility]      Name: Vivienne Jose  Clinic Number: [id number]    Date of Treatment: 12/21/2018     Diagnosis:   Encounter Diagnosis   Name Primary?    Chronic left-sided low back pain with left-sided sciatica Yes     Physician: Amelia Antunez, *       Precautions: L knee OA/pain, HTN, diabetes     Pattern of pain determined: 2     Evaluation Date: 10/22/2018  Authorization Period Expiration: 12/31/18  Plan of Care Expiration: 1/21/18  Reassessment Due: 1/21/18    Visit # / Visits authorized: 14/20  (NB: No lap belt on machine)    Medicare charges: (885)    Time In: 830am  Time Out: 940am  Total Billable Time: 40minutes     Subjective   Vivienne arrives to PT with R knee   Patient reports their pain to be 0/10 on a 0-10 scale with 0 being no pain and 10 being the worst pain imaginable.  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documents   Leisure: computer,                      Pts goals:  To be able to walk further    Objective      MOVEMENT LOSS 11/21/18    ROM Loss   Flexion min loss, no curve reversal   Extension moderate loss   Side bending Right minimal loss   Side bending Left minimal loss   Rotation Right minimal loss   Rotation Left minimal loss       Baseline Isometric Testing on Med X equipment: Testing administered by PT  Date of testing: 10/22/18  ROM 0-30 deg (increased to 36-0 on 11/27/18)    Max Peak Torque 117    Min Peak Torque 25    Flex/Ext Ratio 4.68:1   % below normative data 18   Counter weight 241   femur 6   Seat pad 0               CMS Impairment/Limitation/Restriction for FOTO lumbar Survey     Therapist reviewed FOTO scores for Vivienne Jose on 10/22/2018.   FOTO documents entered into [employer] - see Media section.     Limitation Score: 63%  Category: Mobility     Current : CL = least 60% but < 80% impaired, limited or restricted  Goal: CK = at least 40% but < 60% impaired, limited or  restricted    Visit 10: 60%                  Treatment      Home Exercise Program as follows: see pt instructions for HEP. Seated flexion  Handouts were given to the patient. Pt demo good understanding of the education provided. Vivienne demonstrated good return demonstration of activities.        Vivienne received therapeutic exercises to develop/improve posture, lumbar  ROM, strength and muscular endurance for 60 minutes including the following exercises:   [facility] 12/21/2018   Visit Number 14   VAS Pain Rating 2   Time 10   Flexion in Lying -   Flexion in Sitting 10   Manual Therapy -   Lumbar Extension Seat Pad -   Femur Restraint -   Top Dead Center -   Counterweight -   Lumbar Flexion -   Lumbar Extension -   Lumbar Peak Torque -   Min Torque -   Test Percent Below Normative Data -   Test Percent Gain in Strength from Initial  -   Lumbar Weight 56   Repetitions 20   Rating of Perceived Exertion 3   Ice - Sitting 10       NB: possibly No lap belt  She only did seated and standing HEP due to R knee pain.   HEP:    Seated flexion: x 15  Standing flexion : x 10  Supine flexion: x 10 with ball  Standing extension x 10  Prone press ups x 10        NEUROMUSCULAR:   Taping R knee for pain and stability.None today    Assessment     Pt tolerated treatment fair today secondary R LE neuropathy pain.  Pt ambulates into clinic with use of Quad cane with mod deviations.  Pt reports her LB is a little sorer today due to her gait pattern.  Maintained weigh for Med X with pt completing 20 reps. Pt reports she felt increased ROM after riding bike.  Patient is making good progress towards established goals. Pt is slowly progressing and she is compliant with her HEP, instructed pt to utilize ice on R knee   Pt will continue to benefit from skilled outpatient physical therapy to address the deficits stated in the impairment chart, provide pt/family education and to maximize pt's level of independence in the home and community  environment.       Pt's spiritual, cultural and educational needs considered and pt agreeable to plan of care and goals as stated below:     Medical necessity is demonstrated by the following IMPAIRMENTS/PROBLEMS:    Pt presents with the following impairments:      History  Co-morbidities and personal factors that may impact the plan of care Co-morbidities:   CAD, diabetes, HTN and OA, knee pain, obesity     Personal Factors:   coping style       moderate   Examination  Body Structures and Functions, activity limitations and participation restrictions that may impact the plan of care Body Regions:   back  lower extremities     Body Systems:    ROM  strength  gait     Participation Restrictions:   Walking, standing, house work, cooking showering     Activity limitations:   Learning and applying knowledge  no deficits     General Tasks and Commands  no deficits     Communication  no deficits     Mobility  lifting and carrying objects  walking     Self care  washing oneself (bathing, drying, washing hands)     Domestic Life  shopping  cooking  doing house work (cleaning house, washing dishes, laundry)     Interactions/Relationships  no deficits     Life Areas  no deficits     Community and Social Life  no deficits             moderate   Clinical Presentation stable and uncomplicated low   Decision Making/ Complexity Score: moderate         GOALS: Pt is in agreement with the following goals.     Short term goals:  6 weeks or 10 visits   1.  Pt will demonstrate increased lumbar ROM by at least 3 degrees from the initial ROM value with improvements noted in functional ROM and ability to perform ADLs Met 11/27/18  2.  Pt will demonstrate increased maximum isometric torque value by 5% when compared to the initial value resulting in improved ability to perform bending, lifting, and carrying activities safely, confidently. (Progressing)     3.  Patient report a reduction in worst pain score by 1-2 points for improved  tolerance during work and recreational activities (Progressing)  4.  Pt able to perform HEP correctly with minimal cueing or supervision for therapist (Progressing)        Long term goals: 13 weeks or 20 visits   1. Pt will demonstrate increased lumbar ROM by at least 6 degrees from initial ROM value, resulting in improved ability to perform functional fwd bending while standing and sitting. Met 11/27/18  2. Pt will demonstrate increased maximum isometric torque value by 10% when compared to the initial value resulting in improved ability to perform bending, lifting, and carrying activities safely, confidently. (Progressing)  3. Pt to demonstrate ability to independently control and reduce their pain through posture positioning and mechanical movements throughout a typical day. (Progressing)  4.  Patient will demonstrate improved overall function per FOTO Survey to CK = at least 40% but < 60% impaired, limited or restricted score or less.  5. Pt to begin a progressive walking program in order to increase her physical activity for ADLs and exercising. (Progressing)    Plan   Continue with established Plan of Care towards established PT goals.       Ariana Clifford, PT  12/21/2018

## 2018-12-26 ENCOUNTER — LAB VISIT (OUTPATIENT)
Dept: LAB | Facility: HOSPITAL | Age: 68
End: 2018-12-26
Attending: PSYCHIATRY & NEUROLOGY
Payer: MEDICARE

## 2018-12-26 ENCOUNTER — OFFICE VISIT (OUTPATIENT)
Dept: NEUROLOGY | Facility: CLINIC | Age: 68
End: 2018-12-26
Payer: MEDICARE

## 2018-12-26 ENCOUNTER — CLINICAL SUPPORT (OUTPATIENT)
Dept: REHABILITATION | Facility: OTHER | Age: 68
End: 2018-12-26
Attending: PHYSICAL MEDICINE & REHABILITATION
Payer: MEDICARE

## 2018-12-26 VITALS
HEIGHT: 62 IN | DIASTOLIC BLOOD PRESSURE: 71 MMHG | WEIGHT: 242.06 LBS | HEART RATE: 74 BPM | BODY MASS INDEX: 44.55 KG/M2 | SYSTOLIC BLOOD PRESSURE: 151 MMHG

## 2018-12-26 DIAGNOSIS — M54.17 LUMBOSACRAL RADICULOPATHY: Primary | ICD-10-CM

## 2018-12-26 DIAGNOSIS — M79.2 NEUROPATHIC PAIN: ICD-10-CM

## 2018-12-26 DIAGNOSIS — M54.42 CHRONIC LEFT-SIDED LOW BACK PAIN WITH LEFT-SIDED SCIATICA: Primary | ICD-10-CM

## 2018-12-26 DIAGNOSIS — R26.9 ABNORMALITY OF GAIT AND MOBILITY: ICD-10-CM

## 2018-12-26 DIAGNOSIS — G89.29 CHRONIC LEFT-SIDED LOW BACK PAIN WITH LEFT-SIDED SCIATICA: Primary | ICD-10-CM

## 2018-12-26 LAB
FOLATE SERPL-MCNC: 15.7 NG/ML
VIT B12 SERPL-MCNC: 423 PG/ML

## 2018-12-26 PROCEDURE — 1101F PT FALLS ASSESS-DOCD LE1/YR: CPT | Mod: CPTII,HCNC,S$GLB, | Performed by: PSYCHIATRY & NEUROLOGY

## 2018-12-26 PROCEDURE — 3078F DIAST BP <80 MM HG: CPT | Mod: CPTII,HCNC,S$GLB, | Performed by: PSYCHIATRY & NEUROLOGY

## 2018-12-26 PROCEDURE — 97110 THERAPEUTIC EXERCISES: CPT | Mod: HCNC

## 2018-12-26 PROCEDURE — 99999 PR PBB SHADOW E&M-EST. PATIENT-LVL V: CPT | Mod: PBBFAC,HCNC,, | Performed by: PSYCHIATRY & NEUROLOGY

## 2018-12-26 PROCEDURE — 82607 VITAMIN B-12: CPT | Mod: HCNC

## 2018-12-26 PROCEDURE — 82746 ASSAY OF FOLIC ACID SERUM: CPT | Mod: HCNC

## 2018-12-26 PROCEDURE — 36415 COLL VENOUS BLD VENIPUNCTURE: CPT | Mod: HCNC,PO

## 2018-12-26 PROCEDURE — 3077F SYST BP >= 140 MM HG: CPT | Mod: CPTII,HCNC,S$GLB, | Performed by: PSYCHIATRY & NEUROLOGY

## 2018-12-26 PROCEDURE — 99204 OFFICE O/P NEW MOD 45 MIN: CPT | Mod: HCNC,S$GLB,, | Performed by: PSYCHIATRY & NEUROLOGY

## 2018-12-26 NOTE — PROGRESS NOTES
[facility]      Name: Vivienne Jose  Clinic Number: [id number]    Date of Treatment: 12/26/2018     Diagnosis:   Encounter Diagnosis   Name Primary?    Chronic left-sided low back pain with left-sided sciatica Yes     Physician: Amelia Antunez, *       Precautions: L knee OA/pain, HTN, diabetes     Pattern of pain determined: 2     Evaluation Date: 10/22/2018  Authorization Period Expiration: 12/31/18  Plan of Care Expiration: 1/21/18  Reassessment Due: 12/21/18    Visit # / Visits authorized: 14/20  (NB: No lap belt on machine)    Medicare charges: (944)    Time In: 1:30am  Time Out: 2:30am  Total Billable Time: 50minutes     Face to Face discussion of patient was done between PT and PTA.     Subjective   Vivienne arrives to PT with R knee increased R LE pain from knee to foot. No LBP. She went to a neurology ag[facility] this morning and the dr thinks that her LE pain could be coming from her LB. That is not neuropathy, but she still has a arthritic knee  Patient reports their pain to be 3/10 on a 0-10 scale with 0 being no pain and 10 being the worst pain imaginable.  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documents   Leisure: computer,                      Pts goals:  To be able to walk further    Objective      MOVEMENT LOSS 11/21/18    ROM Loss   Flexion min loss, no curve reversal   Extension moderate loss   Side bending Right minimal loss   Side bending Left minimal loss   Rotation Right minimal loss   Rotation Left minimal loss       Baseline Isometric Testing on Med X equipment: Testing administered by PT  Date of testing: 10/22/18  ROM 0-30 deg (increased to 36-0 on 11/27/18)    Max Peak Torque 117    Min Peak Torque 25    Flex/Ext Ratio 4.68:1   % below normative data 18   Counter weight 241   femur 6   Seat pad 0               CMS Impairment/Limitation/Restriction for FOTO lumbar Survey     Therapist reviewed  FOTO scores for Vivienne Jose on 10/22/2018.   FOTO documents entered into [employer] - see Media section.     Limitation Score: 63%  Category: Mobility     Current : CL = least 60% but < 80% impaired, limited or restricted  Goal: CK = at least 40% but < 60% impaired, limited or restricted    Visit 10: 60%                  Treatment      Home Exercise Program as follows: see pt instructions for HEP. Seated flexion  Handouts were given to the patient. Pt demo good understanding of the education provided. Vivienne demonstrated good return demonstration of activities.        Vivienne received therapeutic exercises to develop/improve posture, lumbar  ROM, strength and muscular endurance for 60 minutes including the following exercises:     NB: possibly No lap belt  She only did seated and standing HEP due to R knee pain.   HEP:    Seated flexion: x 15  Standing flexion : x 10  Supine flexion: x 10 with ball  Standing extension x 10  Prone press ups x 10        NEUROMUSCULAR:   Taping R knee for pain and stability.None today    Assessment     Pt tolerated treatment fair today secondary R LE neuropathy pain.  Pt ambulates into clinic with use of Quad cane with mod deviations due to antalgic gait.  Pt with a weight increase  for Med X with pt completing 20 reps. Pt's LB is feeling better,but R knee and LE pain is worst. EIS did not decrease R LE pain.  Patient is making good progress towards established goals. Pt is slowly progressing and she is compliant with her HEP, instructed pt to utilize ice on R knee   Pt will continue to benefit from skilled outpatient physical therapy to address the deficits stated in the impairment chart, provide pt/family education and to maximize pt's level of independence in the home and community environment.       Pt's spiritual, cultural and educational needs considered and pt agreeable to plan of care and goals as stated below:     Medical necessity is demonstrated by the following  IMPAIRMENTS/PROBLEMS:    Pt presents with the following impairments:      History  Co-morbidities and personal factors that may impact the plan of care Co-morbidities:   CAD, diabetes, HTN and OA, knee pain, obesity     Personal Factors:   coping style       moderate   Examination  Body Structures and Functions, activity limitations and participation restrictions that may impact the plan of care Body Regions:   back  lower extremities     Body Systems:    ROM  strength  gait     Participation Restrictions:   Walking, standing, house work, cooking showering     Activity limitations:   Learning and applying knowledge  no deficits     General Tasks and Commands  no deficits     Communication  no deficits     Mobility  lifting and carrying objects  walking     Self care  washing oneself (bathing, drying, washing hands)     Domestic Life  shopping  cooking  doing house work (cleaning house, washing dishes, laundry)     Interactions/Relationships  no deficits     Life Areas  no deficits     Community and Social Life  no deficits             moderate   Clinical Presentation stable and uncomplicated low   Decision Making/ Complexity Score: moderate         GOALS: Pt is in agreement with the following goals.     Short term goals:  6 weeks or 10 visits   1.  Pt will demonstrate increased lumbar ROM by at least 3 degrees from the initial ROM value with improvements noted in functional ROM and ability to perform ADLs Met 11/27/18  2.  Pt will demonstrate increased maximum isometric torque value by 5% when compared to the initial value resulting in improved ability to perform bending, lifting, and carrying activities safely, confidently. (Progressing)     3.  Patient report a reduction in worst pain score by 1-2 points for improved tolerance during work and recreational activities (Progressing)  4.  Pt able to perform HEP correctly with minimal cueing or supervision for therapist (Progressing)        Long term goals: 13 weeks or  20 visits   1. Pt will demonstrate increased lumbar ROM by at least 6 degrees from initial ROM value, resulting in improved ability to perform functional fwd bending while standing and sitting. Met 11/27/18  2. Pt will demonstrate increased maximum isometric torque value by 10% when compared to the initial value resulting in improved ability to perform bending, lifting, and carrying activities safely, confidently. (Progressing)  3. Pt to demonstrate ability to independently control and reduce their pain through posture positioning and mechanical movements throughout a typical day. (Progressing)  4.  Patient will demonstrate improved overall function per FOTO Survey to CK = at least 40% but < 60% impaired, limited or restricted score or less.  5. Pt to begin a progressive walking program in order to increase her physical activity for ADLs and exercising. (Progressing)    Plan   Continue with established Plan of Care towards established PT goals.       Madisyn Chatterjee, PTA  12/26/2018

## 2018-12-26 NOTE — PROGRESS NOTES
Neurology Clinic Visit  Primary Care Provider: JEAN Felton  Referring Provider: Francine bynum  Date of Visit: 12/26/2018  Reason for visit - neuropathy     chief complaint:   Chief Complaint   Patient presents with    Peripheral Neuropathy     right leg       History of present illness:  Vivienne Jose is a 68 y.o. right handed female I have been asked to consult for evaluation and treatment of possible neuropathy. She reports approximately 2.5 weeks ago, she developed a burning sensation in right thigh and down the right anterior-medial aspect of her leg.  Her home gabapentin was increased to 600mg bid and she states the burning sensation went away. She does reports chronic history of lower back pain with radiating features down the left leg. She also reports numbness and tingling in her feet intermittently. She reports chronic pain in right knee for which she see orthopedics. She is [facility] being [facility].     Patient Active Problem List    Diagnosis Date Noted    Hypothyroidism 11/16/2018    Needs flu shot 11/16/2018    Hematuria 11/16/2018    Chronic left-sided low back pain with left-sided sciatica 10/22/2018    Interstitial cystitis 09/21/2018    Gait instability 07/16/2018    Fatigue 06/21/2018    Type 2 diabetes mellitus with stage 3 chronic kidney disease, with long-term current use of insulin 02/23/2018    Chronic kidney disease, stage III (moderate) 02/23/2018    Diverticulosis of intestine without bleeding 08/02/2017    Skin nodule 08/02/2017    Hypokalemia 04/24/2017    Diabetic polyneuropathy associated with type 2 diabetes mellitus 04/24/2017    Recurrent UTI 03/02/2017    Nephrolithiasis 12/14/2016    Gastroesophageal reflux disease 07/22/2016    Hyperlipidemia 07/22/2016    Chronic pain of right knee 05/02/2016    Recurrent HSV (herpes simplex virus) 03/21/2016    Hypocalcemia 02/29/2016    Morbid obesity with body mass index (BMI) of 40.0 to 44.9 in adult  02/29/2016    Preventative health care 09/22/2015    Osteopenia 09/22/2015    Allergic rhinitis 09/22/2015    Type 2 diabetes mellitus, uncontrolled 09/22/2015    Hypertension associated with diabetes 09/22/2015    Hypothyroidism due to acquired atrophy of thyroid 09/22/2015    Vitamin D deficiency 09/22/2015    Sleep apnea 09/22/2015    Sacroiliitis 06/09/2015    Lumbar facet arthropathy 06/09/2015    Physical deconditioning 06/09/2015    Dyslipidemia associated with type 2 diabetes mellitus 04/28/2015    BAM (obstructive sleep apnea) 04/28/2015    Proteinuria 02/03/2015    Pain in limb 07/24/2014    Stiffness of joint 07/24/2014    Muscle weakness 07/24/2014    Carpal tunnel syndrome of right wrist 07/08/2014    Nuclear sclerosis - Both Eyes 04/02/2013    Chronic allergic rhinitis 11/30/2012    Post-surgical hypothyroidism 11/23/2012    CAD (coronary artery disease) 11/23/2012    Kidney stones 08/24/2012    HTN (hypertension) 08/24/2012    Trigger middle finger of right hand 07/30/2012    Bilateral carotid artery disease 07/26/2011    Facet arthropathy, cervical 07/25/2011     Past Medical History:   Diagnosis Date    Allergy     Angio-edema     Arthritis     Cataract     Cerebrovascular malformation     Colon polyps     Coronary artery disease     Diabetes mellitus, type 2     Diabetic peripheral neuropathy     GERD (gastroesophageal reflux disease)     Herpes infection     Hyperlipidemia     Hypertension     Hypothyroidism     Kidney stones     Nausea & vomiting 11/23/2015    Obesity, morbid     Ovarian cyst     Pyelonephritis, chronic     Seizure disorder, focal motor     Sleep apnea     Type II or unspecified type diabetes mellitus with neurological manifestations, uncontrolled(250.62)     Urinary tract infection     Urticaria     Vaginal infection      Past Surgical History:   Procedure Laterality Date    CARPAL TUNNEL RELEASE Right 2014    COLONOSCOPY       COLONOSCOPY N/A 8/29/2014    [facility]    COLONOSCOPY Golytely N/A 9/13/2017    [facility]    CYST REMOVAL      skin; multiples    CYSTOSCOPY WITH STENT PLACEMENT Left 12/28/2016    [facility] OR    ENDOSCOPIC-RELEASE-CARPAL TUNNEL right Right 7/8/2014    [facility]    EXTRACORPOREAL SHOCK WAVE LITHOTRIPSY  2002    EXTRACTION-STONE-URETEROSCOPY Left 12/28/2016    [facility] OR    HYSTERECTOMY  1984    KIDNEY STONE SURGERY      LITHOTRIPSY-LASER Left 12/28/2016    [facility]    RELEASE-FINGER-TRIGGER right long finger Right 7/8/2014    [facility]    THYROIDECTOMY  1977         TONSILLECTOMY, ADENOIDECTOMY      TRIGGER FINGER RELEASE      TUBAL LIGATION       Family History   Problem Relation Age of Onset    Cancer Father     Arthritis Father     Gout Father     Allergies Son     Allergic rhinitis Son     Skin cancer Mother     Macular degeneration Mother     Dementia Mother     Hypertension Mother     No Known Problems Daughter     Diabetes Daughter     No Known Problems Daughter     Glaucoma Maternal Aunt         Great Maternal Aunt    Leukemia Maternal Uncle     Amblyopia Neg Hx     Cataracts Neg Hx     Retinal detachment Neg Hx     Strabismus Neg Hx     Stroke Neg Hx     Thyroid disease Neg Hx     Kidney disease Neg Hx     Angioedema Neg Hx     Asthma Neg Hx     Atopy Neg Hx     Eczema Neg Hx     Immunodeficiency Neg Hx     Rhinitis Neg Hx     Urticaria Neg Hx          Current Outpatient Medications   Medication Sig    amLODIPine (NORVASC) 5 MG tablet Take 1 tablet (5 mg total) by mouth once daily.    ascorbic acid, vitamin C, (VITAMIN C) 500 mg Chew     aspirin (ECOTRIN) 81 MG EC tablet Take 81 mg by  mouth once daily.    azelastine (ASTELIN) 137 mcg (0.1 %) nasal spray 2 sprays (274 mcg total) by Nasal route 2 (two) times daily.    biotin 10,000 mcg Cap Take by mouth.    blood sugar diagnostic (TRUE METRIX GLUCOSE TEST STRIP) Strp TEST TWO TIMES DAILY    blood-glucose meter Misc [employer] True Metrix Air meter    calcium carbonate-vitamin D3 600 mg(1,500mg) -100 unit Cap Take 1 tablet by mouth once daily.    chlorthalidone (HYGROTEN) 25 MG Tab Take 1 tablet (25 mg total) by mouth once daily.    econazole nitrate 1 % cream Mix with hydrocortisone cream 1% 30 grams use bid prn    fluticasone (FLONASE) 50 mcg/actuation nasal spray 2 sprays (100 mcg total) by Each Nare route once daily.    gabapentin (NEURONTIN) 300 MG capsule Take 2 capsules (600 mg total) by mouth 2 (two) times daily.    glucagon (human recombinant) inj 1mg/mL kit Inject 1 mL (1 mg total) into the muscle as needed.    insulin detemir U-100 (LEVEMIR FLEXTOUCH U-100 INSULN) 100 unit/mL (3 mL) SubQ InPn pen Inject 18 Units into the skin every evening.    ketotifen (ZADITOR) 0.025 % (0.035 %) ophthalmic solution Place 1-2 drops into both eyes once daily.    L.acid-B.bifidum-B.animal-FOS (PROBIOTIC COMPLEX) 25 billion cell -100 mg Cap Take 1 capsule by mouth once daily.    lancets (TRUEPLUS LANCETS) 28 gauge Misc Inject 1 lancet into the skin 2 (two) times daily before meals.    levothyroxine (SYNTHROID) 75 MCG tablet TAKE 1 TABLET EVERY DAY    liraglutide 0.6 mg/0.1 mL, 18 mg/3 mL, subq PNIJ (VICTOZA 3-TAWANA) 0.6 mg/0.1 mL (18 mg/3 mL) PnIj INJECT 1.8 MG INTO THE SKIN ONCE DAILY.    loratadine (CLARITIN) 10 mg tablet Take 10 mg by mouth once daily.    LORazepam (ATIVAN) 0.5 MG tablet TK 1 T PO 1 HOUR B MRI    lovastatin (MEVACOR) 40 MG tablet TAKE 1 TABLET NIGHTLY (SUBSTITUTED FOR MEVACOR)    magnesium oxide-Mg AA chelate (MAGNESIUM, AMINO ACID CHELATE,) 133 mg Tab Take by mouth as directed.     metFORMIN (GLUCOPHAGE) 1000 MG tablet  "Take 1 tablet (1,000 mg total) by mouth 2 (two) times daily with meals.    omeprazole (PRILOSEC) 20 MG capsule Take 1 capsule (20 mg total) by mouth daily as needed.    pen needle, diabetic (BD ULTRA-FINE EDUARDO PEN NEEDLES) 32 gauge x 5/32" Ndle USE WITH VICTOZA AND LEVEMIR (2 INJECTIONS EVERY DAY)    potassium chloride SA (K-DUR,KLOR-CON) 10 MEQ tablet Take 10 meq PO QD except on Mondays, Wednesdays and Fridays, on which she should take 2 tabs (20 meq) QD    PREMARIN vaginal cream Place 0.5 g vaginally 3 (three) times a week.    repaglinide (PRANDIN) 2 MG tablet Take 1 tablet (2 mg total) by mouth 3 (three) times daily before meals.    valACYclovir (VALTREX) 500 MG tablet Take 1 tablet (500 mg total) by mouth once daily.    valsartan (DIOVAN) 160 MG tablet Take 1 tablet (160 mg total) by mouth 2 (two) times daily.    diclofenac sodium (VOLTAREN) 1 % Gel Apply 2 g topically 4 (four) times daily.     Current Facility-Administered Medications   Medication    Allergy Mix       Scheduled Meds:   Allergy Mix   Subcutaneous 1 time in Clinic/HOD         Review of patient's allergies indicates:   Allergen Reactions    Sulfa (sulfonamide antibiotics) Nausea Only    Ciprofloxacin     Januvia [sitagliptin]      abd pain    Lisinopril     Lotensin [benazepril]     Nexium [esomeprazole magnesium] Other (See Comments)     Gas     Social History     Socioeconomic History    Marital status:      Spouse name: Not on file    Number of children: Not on file    Years of education: Not on file    Highest education level: Not on file   Social Needs    Financial resource strain: Not on file    Food insecurity - worry: Not on file    Food insecurity - inability: Not on file    Transportation needs - medical: Not on file    Transportation needs - non-medical: Not on file   Occupational History    Occupation: retired     Employer: [employer], later [employer]   Tobacco Use    Smoking status: Never Smoker    " "Smokeless tobacco: Never Used   Substance and Sexual Activity    Alcohol use: No     Alcohol/week: 0.0 oz    Drug use: No    Sexual activity: Yes     Partners: Male   Other Topics Concern    Are you pregnant or think you may be? Not Asked    Breast-feeding Not Asked   Social History Narrative    Not on file       Review of Systems  Constitutional: negative  Eyes: negative  Ears, nose, mouth, throat, and face: negative  Respiratory: negative  Cardiovascular: negative  Gastrointestinal: negative  Genitourinary:negative  Integument/breast: negative  Hematologic/lymphatic: negative  Musculoskeletal:positive back pain and right knee pain  Neurological: positive for tingling and numbness in feet  Behavioral/Psych: negative  Endocrine: negative  Allergic/Immunologic: negative    Objective:  Vital signs in last 24 hours:    Vitals:    12/26/18 0821   BP: (!) 151/71   Pulse: 74   Weight: 109.8 kg (242 lb 1 oz)   Height: 5' 2" (1.575 m)     General: no acute distress, well nourished, well-groomed  CVS: RRR, no murmur, gallops or rubs  Respiratory: Clear to ausculation  Extremities: no edema    Neurological Examination:    HIGHER INTEGRATIVE FUNCTIONS:  -Normal attention span and concentration; immediately responds to questions and commands  -Oriented to time, place and person  -Recent and remote memory intact  -Language normal (no aphasia or dysarthria)  -Normal fund of knowledge    CN:  -PERRLA, visual fields full, sharp disc margins on fundus exam  -EOMI with normal saccades and smooth pursuit  -Facial sensation intact bilaterally  -Facial strength/movement intact bilaterally  -Hearing intact to finger rub or tuning fork  -Palate elevates symmetrically  -Normal shoulder shrug and head turn  -Tongue protrudes midline    MOTOR: (left/right graded 1-5)  -UE: 5/5 deltoids; 5/5 biceps, triceps; 5/5 wrist flexors, extensors; 5/5 interosseous; 5/5   -LEs: 5/5 hip flexion, extension; 5/5 knee flexion, extension; 5/5 ankle " flexion, extension  -Tone: normal  -No pronator drift, no orbiting    SENSORY:  -Light touch, temperature sensation intact except for decrease temperature in anterior aspect of leg below knee.    REFLEXES:  -2+ upper and lower bilaterally; absent ankle reflex on left  -Flexor plantar reflex bilaterally  -No clonus    COORDINATION:  -FNF, HKS intact bilaterally    GAIT:  -atalgic gait    Assessment/Plan:    1. Chronic lower back pain, with radiating features, suspect lumbosacral radiculopathy  2. History of right carpal tunnel syndrome s/p surgery.  3. Obesity  4. DMII, per PCP  5. HTN, per PCP  6. Neuropathic pain, suspect primarily from lumbosacral radiculopathy with possible component of diabetic neuropathy.    Plan:  Will obtain EMG/NCS of lower extremities  Continue gabapentin for neuropathic pain  Will check B12 and folate    I discussed the assessment and plan with the patient and answered the questions that she and her  had.

## 2018-12-27 ENCOUNTER — TELEPHONE (OUTPATIENT)
Dept: ORTHOPEDICS | Facility: CLINIC | Age: 68
End: 2018-12-27

## 2018-12-27 ENCOUNTER — OFFICE VISIT (OUTPATIENT)
Dept: ORTHOPEDICS | Facility: CLINIC | Age: 68
End: 2018-12-27
Payer: MEDICARE

## 2018-12-27 ENCOUNTER — PATIENT OUTREACH (OUTPATIENT)
Dept: OTHER | Facility: OTHER | Age: 68
End: 2018-12-27

## 2018-12-27 VITALS — BODY MASS INDEX: 44.53 KG/M2 | HEIGHT: 62 IN | WEIGHT: 242 LBS

## 2018-12-27 DIAGNOSIS — M17.11 PRIMARY OSTEOARTHRITIS OF RIGHT KNEE: Primary | ICD-10-CM

## 2018-12-27 PROCEDURE — 99214 OFFICE O/P EST MOD 30 MIN: CPT | Mod: HCNC,S$GLB,, | Performed by: ORTHOPAEDIC SURGERY

## 2018-12-27 PROCEDURE — 1101F PT FALLS ASSESS-DOCD LE1/YR: CPT | Mod: CPTII,HCNC,S$GLB, | Performed by: ORTHOPAEDIC SURGERY

## 2018-12-27 PROCEDURE — 99999 PR PBB SHADOW E&M-EST. PATIENT-LVL III: CPT | Mod: PBBFAC,HCNC,, | Performed by: ORTHOPAEDIC SURGERY

## 2018-12-27 RX ORDER — ACETAMINOPHEN 325 MG/1
1000 TABLET ORAL
Status: CANCELLED | OUTPATIENT
Start: 2018-12-27 | End: 2018-12-28

## 2018-12-27 RX ORDER — OXYCODONE HCL 10 MG/1
10 TABLET, FILM COATED, EXTENDED RELEASE ORAL
Status: CANCELLED | OUTPATIENT
Start: 2018-12-27 | End: 2018-12-28

## 2018-12-27 RX ORDER — PREGABALIN 50 MG/1
150 CAPSULE ORAL
Status: CANCELLED | OUTPATIENT
Start: 2018-12-27 | End: 2018-12-28

## 2018-12-27 RX ORDER — SODIUM CHLORIDE 9 MG/ML
INJECTION, SOLUTION INTRAVENOUS CONTINUOUS
Status: CANCELLED | OUTPATIENT
Start: 2018-12-27

## 2018-12-27 RX ORDER — NAPROXEN 250 MG/1
500 TABLET ORAL
Status: CANCELLED | OUTPATIENT
Start: 2018-12-27 | End: 2018-12-28

## 2018-12-27 NOTE — PROGRESS NOTES
Subjective:      Patient ID: Vivienne Jose is a 68 y.o. female.    Chief Complaint: Pain and Follow-up of the Right Knee      HPI: Vivienne Jose is a 68 y.o. female here in follow-up of chronic right knee pain. She has experienced problems with their right knee for 1-2 years duration. The patient denies relevant history of injury/aggravation. Pain is located medially and  anteriorly Associated symptoms include NA. They have been treated with Corticosteroid, Synvisc injections,, over the counter analgesics, NSAIDS, cane/walker, bracing, physitherapy and activity modification.   Symptoms have recently worsened. Ambulation reportedly has been impaired. Self care ADLs are painful.     Past Medical History:   Diagnosis Date    Allergy     Angio-edema     Arthritis     Cataract     Cerebrovascular malformation     Colon polyps     Coronary artery disease     Diabetes mellitus, type 2     Diabetic peripheral neuropathy     GERD (gastroesophageal reflux disease)     Herpes infection     Hyperlipidemia     Hypertension     Hypothyroidism     Kidney stones     Nausea & vomiting 11/23/2015    Obesity, morbid     Ovarian cyst     Pyelonephritis, chronic     Seizure disorder, focal motor     Sleep apnea     Type II or unspecified type diabetes mellitus with neurological manifestations, uncontrolled(250.62)     Urinary tract infection     Urticaria     Vaginal infection        Current Outpatient Medications:     amLODIPine (NORVASC) 5 MG tablet, Take 1 tablet (5 mg total) by mouth once daily., Disp: 90 tablet, Rfl: 3    ascorbic acid, vitamin C, (VITAMIN C) 500 mg Chew, , Disp: , Rfl:     aspirin (ECOTRIN) 81 MG EC tablet, Take 81 mg by mouth once daily., Disp: , Rfl:     azelastine (ASTELIN) 137 mcg (0.1 %) nasal spray, 2 sprays (274 mcg total) by Nasal route 2 (two) times daily., Disp: 90 mL, Rfl: 4    biotin 10,000 mcg Cap, Take by mouth., Disp: , Rfl:     blood sugar diagnostic (TRUE METRIX  GLUCOSE TEST STRIP) Strp, TEST TWO TIMES DAILY, Disp: 200 strip, Rfl: 6    blood-glucose meter Mis, [employer] True Metrix Air meter, Disp: 1 each, Rfl: 0    calcium carbonate-vitamin D3 600 mg(1,500mg) -100 unit Cap, Take 1 tablet by mouth once daily., Disp: , Rfl:     chlorthalidone (HYGROTEN) 25 MG Tab, Take 1 tablet (25 mg total) by mouth once daily., Disp: 90 tablet, Rfl: 1    econazole nitrate 1 % cream, Mix with hydrocortisone cream 1% 30 grams use bid prn, Disp: 60 g, Rfl: 3    fluticasone (FLONASE) 50 mcg/actuation nasal spray, 2 sprays (100 mcg total) by Each Nare route once daily., Disp: 48 g, Rfl: 4    gabapentin (NEURONTIN) 300 MG capsule, Take 2 capsules (600 mg total) by mouth 2 (two) times daily., Disp: 180 capsule, Rfl: 1    glucagon (human recombinant) inj 1mg/mL kit, Inject 1 mL (1 mg total) into the muscle as needed., Disp: 1 kit, Rfl: 6    insulin detemir U-100 (LEVEMIR FLEXTOUCH U-100 INSULN) 100 unit/mL (3 mL) SubQ InPn pen, Inject 18 Units into the skin every evening., Disp: 1 Box, Rfl: 6    ketotifen (ZADITOR) 0.025 % (0.035 %) ophthalmic solution, Place 1-2 drops into both eyes once daily., Disp: , Rfl:     L.acid-B.bifidum-B.animal-FOS (PROBIOTIC COMPLEX) 25 billion cell -100 mg Cap, Take 1 capsule by mouth once daily., Disp: , Rfl:     lancets (TRUEPLUS LANCETS) 28 gauge Misc, Inject 1 lancet into the skin 2 (two) times daily before meals., Disp: 200 each, Rfl: 6    levothyroxine (SYNTHROID) 75 MCG tablet, TAKE 1 TABLET EVERY DAY, Disp: 90 tablet, Rfl: 3    liraglutide 0.6 mg/0.1 mL, 18 mg/3 mL, subq PNIJ (VICTOZA 3-TAWANA) 0.6 mg/0.1 mL (18 mg/3 mL) PnIj, INJECT 1.8 MG INTO THE SKIN ONCE DAILY., Disp: 27 mL, Rfl: 11    loratadine (CLARITIN) 10 mg tablet, Take 10 mg by mouth once daily., Disp: , Rfl:     LORazepam (ATIVAN) 0.5 MG tablet, TK 1 T PO 1 HOUR B MRI, Disp: , Rfl: 0    lovastatin (MEVACOR) 40 MG tablet, TAKE 1 TABLET NIGHTLY (SUBSTITUTED FOR MEVACOR), Disp: 90 tablet,  "Rfl: 1    magnesium oxide-Mg AA chelate (MAGNESIUM, AMINO ACID CHELATE,) 133 mg Tab, Take by mouth as directed. , Disp: , Rfl:     metFORMIN (GLUCOPHAGE) 1000 MG tablet, Take 1 tablet (1,000 mg total) by mouth 2 (two) times daily with meals., Disp: 180 tablet, Rfl: 3    omeprazole (PRILOSEC) 20 MG capsule, Take 1 capsule (20 mg total) by mouth daily as needed., Disp: 90 capsule, Rfl: 1    pen needle, diabetic (BD ULTRA-FINE EDUARDO PEN NEEDLES) 32 gauge x 5/32" Ndle, USE WITH VICTOZA AND LEVEMIR (2 INJECTIONS EVERY DAY), Disp: 100 each, Rfl: 3    potassium chloride SA (K-DUR,KLOR-CON) 10 MEQ tablet, Take 10 meq PO QD except on Mondays, Wednesdays and Fridays, on which she should take 2 tabs (20 meq) QD, Disp: 145 tablet, Rfl: 1    PREMARIN vaginal cream, Place 0.5 g vaginally 3 (three) times a week., Disp: 30 g, Rfl: 3    repaglinide (PRANDIN) 2 MG tablet, Take 1 tablet (2 mg total) by mouth 3 (three) times daily before meals., Disp: 270 tablet, Rfl: 3    valACYclovir (VALTREX) 500 MG tablet, Take 1 tablet (500 mg total) by mouth once daily., Disp: 90 tablet, Rfl: 1    valsartan (DIOVAN) 160 MG tablet, Take 1 tablet (160 mg total) by mouth 2 (two) times daily., Disp: 180 tablet, Rfl: 1    diclofenac sodium (VOLTAREN) 1 % Gel, Apply 2 g topically 4 (four) times daily., Disp: 3 Tube, Rfl: 3    Current Facility-Administered Medications:     Allergy Mix, , Subcutaneous, 1 time in Clinic/HOD, Ailin Mills MD  Review of patient's allergies indicates:   Allergen Reactions    Sulfa (sulfonamide antibiotics) Nausea Only    Ciprofloxacin     Januvia [sitagliptin]      abd pain    Lisinopril     Lotensin [benazepril]     Nexium [esomeprazole magnesium] Other (See Comments)     Gas       Ht 5' 2" (1.575 m)   Wt 109.8 kg (242 lb)   LMP  (LMP Unknown)   BMI 44.26 kg/m²     ROS      Objective:     There were no vitals filed for this visit. Ortho Exam     right KNEE:  The patient is not in acute distress. "   Body habitus is obese.   The patient walks with a limp.  Resisted SLR negative.   The skin over the knee is intact.  Knee effusion none   Tendernes is located medial and anterior  Range of motion- Flexion 145 deg, Extension 0 deg,   Ligament exam:   MCL 1+ laxity   Lachman intact              Post sag intact    LCL intact  Patellar apprehension negative.  Popliteal cyst positive  Patellar crepitation present.  Flexion/pinch negative.  Pulses DP present, PT present.  Motor no muscle wasting or atrophy.   Sensory normal.    GEN: Well developed, well nourished female. AAOX3. No acute distress.   Normocephalic, atraumatic.   SANCHEZ  Breathing unlabored.  Mood and affect appropriate.        Assessment:     Imaging:  Right knee radiographs from 05/16/2017 reveal joint space narrowing of the medial compartment.      1. Primary osteoarthritis of right knee          Plan:       Orders Placed This Encounter    Case Request Operating Room: ARTHROPLASTY, KNEE      Patient has tried and failed multiple nonsurgical treatments as listed above.  She is given thought to total knee replacement and would like to move forward with definitive surgical treatment at this time.  Pre, tyalor, and post operative procedures and expectations discussed. All questions were answered. Consent forms were explained and signed by the patient.   Vivienne Jose will contact us if there are any questions, concerns, or changes in medical status prior to surgery.  Dr. Gamboa was present for the explanation of surgery.       Follow-up if symptoms worsen or fail to improve.

## 2018-12-27 NOTE — PROGRESS NOTES
Last 5 Patient Entered Readings                                      Current 30 Day Average: 142/72     Recent Readings 12/26/2018 12/26/2018 12/25/2018 12/25/2018 12/24/2018    SBP (mmHg) 131 131 142 142 139    DBP (mmHg) 66 66 73 73 69    Pulse 70 70 72 72 64        Mrs. Jose's BP is starting to improve. She continues to have pain and is waiting to see the orthopedist as we speak. Will continue to monitor BP on current regimen.     Patient's BP average is above goal of <130/80.     Will continue to monitor regularly. Will follow up in 4-6 weeks, sooner if BP begins to trend upward or downward.    Patient has my contact information and knows to call with any concerns or clinical changes.     Current HTN regimen:  Hypertension Medications             amLODIPine (NORVASC) 5 MG tablet Take 1 tablet (5 mg total) by mouth once daily.    chlorthalidone (HYGROTEN) 25 MG Tab Take 1 tablet (25 mg total) by mouth once daily.    valsartan (DIOVAN) 160 MG tablet Take 1 tablet (160 mg total) by mouth 2 (two) times daily.              Last 6 Patient Entered Readings                                          Most Recent A1c: 7.7% on 11/12/2018  (Goal: 7%)     Recent Readings 12/27/2018 12/26/2018 12/26/2018 12/25/2018 12/25/2018    Blood Glucose (mg/dL) 175 186 179 180 146        Mrs. Jose's BG readings are starting to stabilize out of the 200s. She is tolerating increase in Levemir dose. Will continue to monitor on current regimen. If BG remains elevated, will increase Levemir to 20 u QPM.    I will continue to monitor regularly and will follow up in 4-6 weeks, sooner if there are any concerning blood glucose readings.     Patient has my contact information and knows to call with any concerns or clinical changes.     Diabetes Medications             glucagon (human recombinant) inj 1mg/mL kit Inject 1 mL (1 mg total) into the muscle as needed.    insulin detemir U-100 (LEVEMIR FLEXTOUCH U-100 INSULN) 100 unit/mL (3 mL) SubQ  InPn pen Inject 18 Units into the skin every evening.    liraglutide 0.6 mg/0.1 mL, 18 mg/3 mL, subq PNIJ (VICTOZA 3-TAWANA) 0.6 mg/0.1 mL (18 mg/3 mL) PnIj INJECT 1.8 MG INTO THE SKIN ONCE DAILY.    metFORMIN (GLUCOPHAGE) 1000 MG tablet Take 1 tablet (1,000 mg total) by mouth 2 (two) times daily with meals.    repaglinide (PRANDIN) 2 MG tablet Take 1 tablet (2 mg total) by mouth 3 (three) times daily before meals.

## 2018-12-28 ENCOUNTER — CLINICAL SUPPORT (OUTPATIENT)
Dept: REHABILITATION | Facility: OTHER | Age: 68
End: 2018-12-28
Attending: PHYSICAL MEDICINE & REHABILITATION
Payer: MEDICARE

## 2018-12-28 ENCOUNTER — PATIENT MESSAGE (OUTPATIENT)
Dept: ADMINISTRATIVE | Facility: OTHER | Age: 68
End: 2018-12-28

## 2018-12-28 DIAGNOSIS — M17.11 PRIMARY OSTEOARTHRITIS OF RIGHT KNEE: Primary | ICD-10-CM

## 2018-12-28 DIAGNOSIS — G89.29 CHRONIC LEFT-SIDED LOW BACK PAIN WITH LEFT-SIDED SCIATICA: Primary | ICD-10-CM

## 2018-12-28 DIAGNOSIS — M54.42 CHRONIC LEFT-SIDED LOW BACK PAIN WITH LEFT-SIDED SCIATICA: Primary | ICD-10-CM

## 2018-12-28 PROCEDURE — 97110 THERAPEUTIC EXERCISES: CPT | Mod: HCNC

## 2018-12-28 NOTE — PROGRESS NOTES
[facility] Back Physical Therapy Treatment      Name: Vivienne Jose  Clinic Number: [id number]    Date of Treatment: 12/28/2018     Diagnosis:   Encounter Diagnosis   Name Primary?    Chronic left-sided low back pain with left-sided sciatica Yes     Physician: Amelia Antunez, *       Precautions: L knee OA/pain, HTN, diabetes     Pattern of pain determined: 2     Evaluation Date: 10/22/2018  Authorization Period Expiration: 12/31/18  Plan of Care Expiration: 1/21/18  Reassessment Due: 12/21/18    Visit # / Visits authorized: 16/20  (NB: No lap belt on machine)    Medicare charges: (447)    Time In: 0832  Time Out: 0930  Total Billable Time: 58 minutes     Face to Face discussion of patient was done between PT and PTA.     Subjective   Vivienne reports her knee is doing better today, and her back is not hurting currently. She gets some pain when she move in some ways, randomly.     Patient reports their pain to be 2/10 on a 0-10 scale with 0 being no pain and 10 being the worst pain imaginable.  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documents   Leisure: computer,                      Pts goals:  To be able to walk further    Objective      MOVEMENT LOSS 11/21/18    ROM Loss   Flexion min loss, no curve reversal   Extension moderate loss   Side bending Right minimal loss   Side bending Left minimal loss   Rotation Right minimal loss   Rotation Left minimal loss       Baseline Isometric Testing on Med X equipment: Testing administered by PT  Date of testing: 10/22/18  ROM 0-30 deg (increased to 36-0 on 11/27/18)    Max Peak Torque 117    Min Peak Torque 25    Flex/Ext Ratio 4.68:1   % below normative data 18   Counter weight 241   femur 6   Seat pad 0               CMS Impairment/Limitation/Restriction for FOTO lumbar Survey     Therapist reviewed FOTO scores for Vivienne Jose on 10/22/2018.   FOTO documents entered into [employer] - see Media  section.     Limitation Score: 63%  Category: Mobility     Current : CL = least 60% but < 80% impaired, limited or restricted  Goal: CK = at least 40% but < 60% impaired, limited or restricted    Visit 10: 60%                  Treatment      Home Exercise Program as follows: see pt instructions for HEP. Seated flexion  Handouts were given to the patient. Pt demo good understanding of the education provided. Vivienne demonstrated good return demonstration of activities.        Vivienne received therapeutic exercises to develop/improve posture, lumbar  ROM, strength and muscular endurance for 58 minutes including the following exercises:       ANTONIETTA with ball x10  LTR with ball x10  PPT x10    NB: possibly No lap belt  She only did seated and standing HEP due to R knee pain.   HEP:  Seated flexion: x 15  Standing flexion : x 10  Supine flexion: x 10 with ball  Standing extension x 10  Prone press ups x 10        NEUROMUSCULAR:   Taping R knee for pain and stability.None today    Assessment     Vivienne was provided with a 5% weight increase to 59# today. Computer recorded 20 reps at 56# at last session. She completed 20 reps at 59# today, with an RPE of 3. Her knee is feeling better and her back is not hurting. She had no new c/o. Increase lumbar weight by 5% at next visit.     Patient is making good progress towards established goals. Pt is slowly progressing and she is compliant with her HEP, instructed pt to utilize ice on R knee   Pt will continue to benefit from skilled outpatient physical therapy to address the deficits stated in the impairment chart, provide pt/family education and to maximize pt's level of independence in the home and community environment.       Pt's spiritual, cultural and educational needs considered and pt agreeable to plan of care and goals as stated below:     Medical necessity is demonstrated by the following IMPAIRMENTS/PROBLEMS:    Pt presents with the following impairments:       History  Co-morbidities and personal factors that may impact the plan of care Co-morbidities:   CAD, diabetes, HTN and OA, knee pain, obesity     Personal Factors:   coping style       moderate   Examination  Body Structures and Functions, activity limitations and participation restrictions that may impact the plan of care Body Regions:   back  lower extremities     Body Systems:    ROM  strength  gait     Participation Restrictions:   Walking, standing, house work, cooking showering     Activity limitations:   Learning and applying knowledge  no deficits     General Tasks and Commands  no deficits     Communication  no deficits     Mobility  lifting and carrying objects  walking     Self care  washing oneself (bathing, drying, washing hands)     Domestic Life  shopping  cooking  doing house work (cleaning house, washing dishes, laundry)     Interactions/Relationships  no deficits     Life Areas  no deficits     Community and Social Life  no deficits             moderate   Clinical Presentation stable and uncomplicated low   Decision Making/ Complexity Score: moderate         GOALS: Pt is in agreement with the following goals.     Short term goals:  6 weeks or 10 visits   1.  Pt will demonstrate increased lumbar ROM by at least 3 degrees from the initial ROM value with improvements noted in functional ROM and ability to perform ADLs Met 11/27/18  2.  Pt will demonstrate increased maximum isometric torque value by 5% when compared to the initial value resulting in improved ability to perform bending, lifting, and carrying activities safely, confidently. (Progressing)     3.  Patient report a reduction in worst pain score by 1-2 points for improved tolerance during work and recreational activities (Progressing)  4.  Pt able to perform HEP correctly with minimal cueing or supervision for therapist (Progressing)        Long term goals: 13 weeks or 20 visits   1. Pt will demonstrate increased lumbar ROM by at least  6 degrees from initial ROM value, resulting in improved ability to perform functional fwd bending while standing and sitting. Met 11/27/18  2. Pt will demonstrate increased maximum isometric torque value by 10% when compared to the initial value resulting in improved ability to perform bending, lifting, and carrying activities safely, confidently. (Progressing)  3. Pt to demonstrate ability to independently control and reduce their pain through posture positioning and mechanical movements throughout a typical day. (Progressing)  4.  Patient will demonstrate improved overall function per FOTO Survey to CK = at least 40% but < 60% impaired, limited or restricted score or less.  5. Pt to begin a progressive walking program in order to increase her physical activity for ADLs and exercising. (Progressing)    Plan   Continue with established Plan of Care towards established PT goals.       Jax Vargas, PT  12/28/2018

## 2019-01-02 ENCOUNTER — CLINICAL SUPPORT (OUTPATIENT)
Dept: REHABILITATION | Facility: OTHER | Age: 69
End: 2019-01-02
Attending: PHYSICAL MEDICINE & REHABILITATION
Payer: MEDICARE

## 2019-01-02 DIAGNOSIS — G89.29 CHRONIC LEFT-SIDED LOW BACK PAIN WITH LEFT-SIDED SCIATICA: Primary | ICD-10-CM

## 2019-01-02 DIAGNOSIS — M54.42 CHRONIC LEFT-SIDED LOW BACK PAIN WITH LEFT-SIDED SCIATICA: Primary | ICD-10-CM

## 2019-01-02 PROCEDURE — 97110 THERAPEUTIC EXERCISES: CPT | Mod: HCNC

## 2019-01-02 NOTE — PROGRESS NOTES
[facility] Physical Therapy Treatment      Name: Vivienne Jose  Clinic Number: [id number]    Date of Treatment: 01/02/2019     Diagnosis:   Encounter Diagnosis   Name Primary?    Chronic left-sided low back pain with left-sided sciatica Yes     Physician: Amelia Antunez, *       Precautions: L knee OA/pain, HTN, diabetes     Pattern of pain determined: 2     Evaluation Date: 10/22/2018  Authorization Period Expiration: 12/31/18  Plan of Care Expiration: 1/21/18  Reassessment Due: 12/21/18    Visit # / Visits authorized: 17/20  (NB: No lap belt on machine)   ([employer] computer not working, reps and weight changed in note only)  Medicare charges: (780)    Time In: 7:30  Time Out: 8:30  Total Billable Time: 50 minutes     Face to Face discussion of patient was done between PT and PTA.     Subjective   Vivienne reports her knee is doing better today, and her back is not hurting currently. She gets some pain when she move in some ways, randomly.     Patient reports their pain to be 2/10 on a 0-10 scale with 0 being no pain and 10 being the worst pain imaginable.  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documents   Leisure: computer,                      Pts goals:  To be able to walk further    Objective      MOVEMENT LOSS 11/21/18    ROM Loss   Flexion min loss, no curve reversal   Extension moderate loss   Side bending Right minimal loss   Side bending Left minimal loss   Rotation Right minimal loss   Rotation Left minimal loss       Baseline Isometric Testing on Med X equipment: Testing administered by PT  Date of testing: 10/22/18  ROM 0-30 deg (increased to 36-0 on 11/27/18)    Max Peak Torque 117    Min Peak Torque 25    Flex/Ext Ratio 4.68:1   % below normative data 18   Counter weight 241   femur 6   Seat pad 0               CMS Impairment/Limitation/Restriction for FOTO lumbar Survey     Therapist reviewed FOTO scores for Vivienne Jose on  10/22/2018.   FOTO documents entered into [employer] - see Media section.     Limitation Score: 63%  Category: Mobility     Current : CL = least 60% but < 80% impaired, limited or restricted  Goal: CK = at least 40% but < 60% impaired, limited or restricted    Visit 10: 60%                  Treatment      Home Exercise Program as follows: see pt instructions for HEP. Seated flexion  Handouts were given to the patient. Pt demo good understanding of the education provided. Vivienne demonstrated good return demonstration of activities.        Vivienne received therapeutic exercises to develop/improve posture, lumbar  ROM, strength and muscular endurance for 58 minutes including the following exercises:     [facility] 1/2/2019   Visit Number 17   VAS Pain Rating 2   Time 10   Flexion in Lying 10   Flexion in Sitting 10   Manual Therapy -   Lumbar Extension Seat Pad -   Femur Restraint -   Top Dead Center -   Counterweight -   Lumbar Flexion -   Lumbar Extension -   Lumbar Peak Torque -   Min Torque -   Test Percent Below Normative Data -   Test Percent Gain in Strength from Initial  -   Lumbar Weight 63   Repetitions 20   Rating of Perceived Exertion 4   Ice - Sitting 10   ANTONIETTA with ball x10  LTR with ball x10  PPT x10    NB: possibly No lap belt  She only did seated and standing HEP due to R knee pain.   HEP:  Seated flexion: x 15  Standing flexion : x 10  Supine flexion: x 10 with ball  Standing extension x 10  Prone press ups x 10        NEUROMUSCULAR:   Taping R knee for pain and stability.None today    Assessment     Vivienne was provided with a 5% weight increase to 63# due to pt did 20 reps on the medx machine last time with no c/o increased LBP.   Her knee is feeling better and her back is not hurting. She had no new c/o. Increase lumbar weight by 5% at next visit.     Patient is making good progress towards established goals. Pt is slowly progressing and she is compliant with her HEP, instructed pt to utilize ice on R  knee   Pt will continue to benefit from skilled outpatient physical therapy to address the deficits stated in the impairment chart, provide pt/family education and to maximize pt's level of independence in the home and community environment.       Pt's spiritual, cultural and educational needs considered and pt agreeable to plan of care and goals as stated below:     Medical necessity is demonstrated by the following IMPAIRMENTS/PROBLEMS:    Pt presents with the following impairments:      History  Co-morbidities and personal factors that may impact the plan of care Co-morbidities:   CAD, diabetes, HTN and OA, knee pain, obesity     Personal Factors:   coping style       moderate   Examination  Body Structures and Functions, activity limitations and participation restrictions that may impact the plan of care Body Regions:   back  lower extremities     Body Systems:    ROM  strength  gait     Participation Restrictions:   Walking, standing, house work, cooking showering     Activity limitations:   Learning and applying knowledge  no deficits     General Tasks and Commands  no deficits     Communication  no deficits     Mobility  lifting and carrying objects  walking     Self care  washing oneself (bathing, drying, washing hands)     Domestic Life  shopping  cooking  doing house work (cleaning house, washing dishes, laundry)     Interactions/Relationships  no deficits     Life Areas  no deficits     Community and Social Life  no deficits             moderate   Clinical Presentation stable and uncomplicated low   Decision Making/ Complexity Score: moderate         GOALS: Pt is in agreement with the following goals.     Short term goals:  6 weeks or 10 visits   1.  Pt will demonstrate increased lumbar ROM by at least 3 degrees from the initial ROM value with improvements noted in functional ROM and ability to perform ADLs Met 11/27/18  2.  Pt will demonstrate increased maximum isometric torque value by 5% when compared  to the initial value resulting in improved ability to perform bending, lifting, and carrying activities safely, confidently. (Progressing)     3.  Patient report a reduction in worst pain score by 1-2 points for improved tolerance during work and recreational activities (Progressing)  4.  Pt able to perform HEP correctly with minimal cueing or supervision for therapist (Progressing)        Long term goals: 13 weeks or 20 visits   1. Pt will demonstrate increased lumbar ROM by at least 6 degrees from initial ROM value, resulting in improved ability to perform functional fwd bending while standing and sitting. Met 11/27/18  2. Pt will demonstrate increased maximum isometric torque value by 10% when compared to the initial value resulting in improved ability to perform bending, lifting, and carrying activities safely, confidently. (Progressing)  3. Pt to demonstrate ability to independently control and reduce their pain through posture positioning and mechanical movements throughout a typical day. (Progressing)  4.  Patient will demonstrate improved overall function per FOTO Survey to CK = at least 40% but < 60% impaired, limited or restricted score or less.  5. Pt to begin a progressive walking program in order to increase her physical activity for ADLs and exercising. (Progressing)    Plan   Continue with established Plan of Care towards established PT goals.       Madisyn Chatterjee, PTA  1/2/2019

## 2019-01-04 ENCOUNTER — CLINICAL SUPPORT (OUTPATIENT)
Dept: REHABILITATION | Facility: OTHER | Age: 69
End: 2019-01-04
Attending: PHYSICAL MEDICINE & REHABILITATION
Payer: MEDICARE

## 2019-01-04 DIAGNOSIS — M54.42 CHRONIC LEFT-SIDED LOW BACK PAIN WITH LEFT-SIDED SCIATICA: Primary | ICD-10-CM

## 2019-01-04 DIAGNOSIS — G89.29 CHRONIC LEFT-SIDED LOW BACK PAIN WITH LEFT-SIDED SCIATICA: Primary | ICD-10-CM

## 2019-01-04 PROCEDURE — 97110 THERAPEUTIC EXERCISES: CPT | Mod: HCNC

## 2019-01-04 NOTE — PROGRESS NOTES
[facility] Physical Therapy Treatment      Name: Vivienne Jose  Clinic Number: [id number]    Date of Treatment: 01/04/2019     Diagnosis:   Encounter Diagnosis   Name Primary?    Chronic left-sided low back pain with left-sided sciatica Yes     Physician: Amelia Antunez, *       Precautions: L knee OA/pain, HTN, diabetes     Pattern of pain determined: 2     Evaluation Date: 10/22/2018  Authorization Period Expiration: 12/31/18  Plan of Care Expiration: 1/21/18  Reassessment Due: 1/21/19    Visit # / Visits authorized: 18/20  (NB: No lap belt on machine)   ([employer] computer not working, reps and weight changed in note only)  Medicare charges: (780)    Time In: 10:30  Time Out: 11:30  Total Billable Time: 50 minutes     Face to Face discussion of patient was done between PT and PTA.     Subjective   Vivienne reports her knee is doing better today, and her back is not hurting currently. She does feel tightness in B hips due to she is having less knee pain. She is getting R knee surgery March 6th. She is very happy.     Patient reports their pain to be 2/10 on a 0-10 scale with 0 being no pain and 10 being the worst pain imaginable.  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documents   Leisure: computer,                      Pts goals:  To be able to walk further    Objective      MOVEMENT LOSS 11/21/18    ROM Loss   Flexion min loss, no curve reversal   Extension moderate loss   Side bending Right minimal loss   Side bending Left minimal loss   Rotation Right minimal loss   Rotation Left minimal loss       Baseline Isometric Testing on Med X equipment: Testing administered by PT  Date of testing: 10/22/18  ROM 0-30 deg (increased to 36-0 on 11/27/18)    Max Peak Torque 117    Min Peak Torque 25    Flex/Ext Ratio 4.68:1   % below normative data 18   Counter weight 241   femur 6   Seat pad 0               CMS Impairment/Limitation/Restriction for  FOTO lumbar Survey     Therapist reviewed FOTO scores for Vivienne Jose on 10/22/2018.   FOTO documents entered into [employer] - see Media section.     Limitation Score: 63%  Category: Mobility     Current : CL = least 60% but < 80% impaired, limited or restricted  Goal: CK = at least 40% but < 60% impaired, limited or restricted    Visit 10: 60%                  Treatment      Home Exercise Program as follows: see pt instructions for HEP. Seated flexion  Handouts were given to the patient. Pt demo good understanding of the education provided. Vivienne demonstrated good return demonstration of activities.        Vivienne received therapeutic exercises to develop/improve posture, lumbar  ROM, strength and muscular endurance for 58 minutes including the following exercises:   [facility] 1/4/2019   Visit Number 18   VAS Pain Rating 2   Time 10   Flexion in Lying 10   Flexion in Sitting 10   Manual Therapy -   Lumbar Extension Seat Pad -   Femur Restraint -   Top Dead Center -   Counterweight -   Lumbar Flexion -   Lumbar Extension -   Lumbar Peak Torque -   Min Torque -   Test Percent Below Normative Data -   Test Percent Gain in Strength from Initial  -   Lumbar Weight 66   Repetitions 20   Rating of Perceived Exertion 3   Ice - Sitting 10   ANTONIETTA with ball x10 (NT)  LTR with ball x10 (NT)  PPT x10 (NT)  Seated piriformis stretch 3x 10 secs (Pt can only do when R knee is not hurting.)  NB: possibly No lap belt    HEP:  Seated flexion: x 15  Standing flexion : x 10  Supine flexion: x 10 with ball  Standing extension x 10  Prone press ups x 10  Added seated piriformis stretch 3x 10 secs (Pt can only do when R knee is not hurting.)          Assessment     Vivienne was provided with a 5% weight increase to 66# due to pt did 20 reps on the medx machine last time with no c/o increased LBP.   Her knee is feeling better and her LB/hips felt better post session. She had no new c/o. Increase lumbar weight by 5% at next visit.      Patient is making good progress towards established goals. Pt is slowly progressing and she is compliant with her HEP, instructed pt to utilize ice on R knee   Pt will continue to benefit from skilled outpatient physical therapy to address the deficits stated in the impairment chart, provide pt/family education and to maximize pt's level of independence in the home and community environment.       Pt's spiritual, cultural and educational needs considered and pt agreeable to plan of care and goals as stated below:     Medical necessity is demonstrated by the following IMPAIRMENTS/PROBLEMS:    Pt presents with the following impairments:      History  Co-morbidities and personal factors that may impact the plan of care Co-morbidities:   CAD, diabetes, HTN and OA, knee pain, obesity     Personal Factors:   coping style       moderate   Examination  Body Structures and Functions, activity limitations and participation restrictions that may impact the plan of care Body Regions:   back  lower extremities     Body Systems:    ROM  strength  gait     Participation Restrictions:   Walking, standing, house work, cooking showering     Activity limitations:   Learning and applying knowledge  no deficits     General Tasks and Commands  no deficits     Communication  no deficits     Mobility  lifting and carrying objects  walking     Self care  washing oneself (bathing, drying, washing hands)     Domestic Life  shopping  cooking  doing house work (cleaning house, washing dishes, laundry)     Interactions/Relationships  no deficits     Life Areas  no deficits     Community and Social Life  no deficits             moderate   Clinical Presentation stable and uncomplicated low   Decision Making/ Complexity Score: moderate         GOALS: Pt is in agreement with the following goals.     Short term goals:  6 weeks or 10 visits   1.  Pt will demonstrate increased lumbar ROM by at least 3 degrees from the initial ROM value with  improvements noted in functional ROM and ability to perform ADLs Met 11/27/18  2.  Pt will demonstrate increased maximum isometric torque value by 5% when compared to the initial value resulting in improved ability to perform bending, lifting, and carrying activities safely, confidently. (Progressing)     3.  Patient report a reduction in worst pain score by 1-2 points for improved tolerance during work and recreational activities (Progressing)  4.  Pt able to perform HEP correctly with minimal cueing or supervision for therapist (Progressing)        Long term goals: 13 weeks or 20 visits   1. Pt will demonstrate increased lumbar ROM by at least 6 degrees from initial ROM value, resulting in improved ability to perform functional fwd bending while standing and sitting. Met 11/27/18  2. Pt will demonstrate increased maximum isometric torque value by 10% when compared to the initial value resulting in improved ability to perform bending, lifting, and carrying activities safely, confidently. (Progressing)  3. Pt to demonstrate ability to independently control and reduce their pain through posture positioning and mechanical movements throughout a typical day. (Progressing)  4.  Patient will demonstrate improved overall function per FOTO Survey to CK = at least 40% but < 60% impaired, limited or restricted score or less.  5. Pt to begin a progressive walking program in order to increase her physical activity for ADLs and exercising. (Progressing)    Plan   Continue with established Plan of Care towards established PT goals.       Madisyn Chatterjee, PTA  1/4/2019

## 2019-01-05 ENCOUNTER — OFFICE VISIT (OUTPATIENT)
Dept: URGENT CARE | Facility: CLINIC | Age: 69
End: 2019-01-05
Payer: MEDICARE

## 2019-01-05 VITALS
HEIGHT: 62 IN | RESPIRATION RATE: 16 BRPM | SYSTOLIC BLOOD PRESSURE: 153 MMHG | DIASTOLIC BLOOD PRESSURE: 64 MMHG | OXYGEN SATURATION: 99 % | WEIGHT: 242 LBS | TEMPERATURE: 98 F | HEART RATE: 80 BPM | BODY MASS INDEX: 44.53 KG/M2

## 2019-01-05 DIAGNOSIS — N30.00 ACUTE CYSTITIS WITHOUT HEMATURIA: Primary | ICD-10-CM

## 2019-01-05 DIAGNOSIS — R30.0 DYSURIA: ICD-10-CM

## 2019-01-05 LAB
BILIRUB UR QL STRIP: NEGATIVE
GLUCOSE UR QL STRIP: NEGATIVE
KETONES UR QL STRIP: NEGATIVE
LEUKOCYTE ESTERASE UR QL STRIP: POSITIVE
PH, POC UA: 8 (ref 5–8)
POC BLOOD, URINE: NEGATIVE
POC NITRATES, URINE: POSITIVE
PROT UR QL STRIP: NEGATIVE
SP GR UR STRIP: 1.01 (ref 1–1.03)
UROBILINOGEN UR STRIP-ACNC: NORMAL (ref 0.1–1.1)

## 2019-01-05 PROCEDURE — 96372 THER/PROPH/DIAG INJ SC/IM: CPT | Mod: 59,S$GLB,, | Performed by: PHYSICIAN ASSISTANT

## 2019-01-05 PROCEDURE — 1101F PR PT FALLS ASSESS DOC 0-1 FALLS W/OUT INJ PAST YR: ICD-10-PCS | Mod: CPTII,S$GLB,, | Performed by: PHYSICIAN ASSISTANT

## 2019-01-05 PROCEDURE — 3078F PR MOST RECENT DIASTOLIC BLOOD PRESSURE < 80 MM HG: ICD-10-PCS | Mod: CPTII,S$GLB,, | Performed by: PHYSICIAN ASSISTANT

## 2019-01-05 PROCEDURE — 81003 POCT URINALYSIS, DIPSTICK, AUTOMATED, W/O SCOPE: ICD-10-PCS | Mod: QW,S$GLB,, | Performed by: PHYSICIAN ASSISTANT

## 2019-01-05 PROCEDURE — 3077F SYST BP >= 140 MM HG: CPT | Mod: CPTII,S$GLB,, | Performed by: PHYSICIAN ASSISTANT

## 2019-01-05 PROCEDURE — 99214 PR OFFICE/OUTPT VISIT, EST, LEVL IV, 30-39 MIN: ICD-10-PCS | Mod: 25,S$GLB,, | Performed by: PHYSICIAN ASSISTANT

## 2019-01-05 PROCEDURE — 99214 OFFICE O/P EST MOD 30 MIN: CPT | Mod: 25,S$GLB,, | Performed by: PHYSICIAN ASSISTANT

## 2019-01-05 PROCEDURE — 96372 PR INJECTION,THERAP/PROPH/DIAG2ST, IM OR SUBCUT: ICD-10-PCS | Mod: 59,S$GLB,, | Performed by: PHYSICIAN ASSISTANT

## 2019-01-05 PROCEDURE — 81003 URINALYSIS AUTO W/O SCOPE: CPT | Mod: QW,S$GLB,, | Performed by: PHYSICIAN ASSISTANT

## 2019-01-05 PROCEDURE — 1101F PT FALLS ASSESS-DOCD LE1/YR: CPT | Mod: CPTII,S$GLB,, | Performed by: PHYSICIAN ASSISTANT

## 2019-01-05 PROCEDURE — 3078F DIAST BP <80 MM HG: CPT | Mod: CPTII,S$GLB,, | Performed by: PHYSICIAN ASSISTANT

## 2019-01-05 PROCEDURE — 3077F PR MOST RECENT SYSTOLIC BLOOD PRESSURE >= 140 MM HG: ICD-10-PCS | Mod: CPTII,S$GLB,, | Performed by: PHYSICIAN ASSISTANT

## 2019-01-05 RX ORDER — CEFTRIAXONE 1 G/1
1 INJECTION, POWDER, FOR SOLUTION INTRAMUSCULAR; INTRAVENOUS
Status: COMPLETED | OUTPATIENT
Start: 2019-01-05 | End: 2019-01-05

## 2019-01-05 RX ORDER — CEFDINIR 300 MG/1
300 CAPSULE ORAL 2 TIMES DAILY
Qty: 10 CAPSULE | Refills: 0 | Status: SHIPPED | OUTPATIENT
Start: 2019-01-05 | End: 2019-01-10

## 2019-01-05 RX ADMIN — CEFTRIAXONE 1 G: 1 INJECTION, POWDER, FOR SOLUTION INTRAMUSCULAR; INTRAVENOUS at 01:01

## 2019-01-05 NOTE — PROGRESS NOTES
"Subjective:       Patient ID: Vivienne Jose is a 68 y.o. female.    Vitals:  height is 5' 2" (1.575 m) and weight is 109.8 kg (242 lb). Her oral temperature is 97.6 °F (36.4 °C). Her blood pressure is 153/64 (abnormal) and her pulse is 80. Her respiration is 16 and oxygen saturation is 99%.     Chief Complaint: Urinary Tract Infection    Pt started to feel onset of an UTI three days ago. She started to feel back pain and noticed a different odor and wanted to see if she has an UTI.       Urinary Tract Infection    This is a new problem. The current episode started in the past 7 days (3 days ago). The problem occurs intermittently. The problem has been gradually worsening. The pain is at a severity of 1/10. The patient is experiencing no pain. There has been no fever. Associated symptoms include frequency and urgency. Pertinent negatives include no chills, hematuria, nausea, vomiting or rash. She has tried nothing for the symptoms. The treatment provided no relief.       Constitution: Negative for chills and fever.   Neck: Negative for painful lymph nodes.   Gastrointestinal: Negative for abdominal pain, nausea and vomiting.   Genitourinary: Positive for dysuria, frequency and urgency. Negative for urine decreased, hematuria, history of kidney stones, painful menstruation, irregular menstruation, missed menses, heavy menstrual bleeding, ovarian cysts, genital trauma, vaginal pain, vaginal discharge, vaginal bleeding, vaginal odor, painful intercourse, genital sore, painful ejaculation and pelvic pain.   Musculoskeletal: Negative for back pain.   Skin: Negative for rash and lesion.   Hematologic/Lymphatic: Negative for swollen lymph nodes.       Objective:      Physical Exam   Constitutional: She is oriented to person, place, and time. She appears well-developed and well-nourished. She is cooperative.  Non-toxic appearance. She does not appear ill. No distress.   HENT:   Head: Normocephalic and atraumatic.   Right " Ear: Hearing, tympanic membrane, external ear and ear canal normal.   Left Ear: Hearing, tympanic membrane, external ear and ear canal normal.   Nose: Nose normal. No mucosal edema, rhinorrhea or nasal deformity. No epistaxis. Right sinus exhibits no maxillary sinus tenderness and no frontal sinus tenderness. Left sinus exhibits no maxillary sinus tenderness and no frontal sinus tenderness.   Mouth/Throat: Uvula is midline, oropharynx is clear and moist and mucous membranes are normal. No trismus in the jaw. Normal dentition. No uvula swelling. No posterior oropharyngeal erythema.   Eyes: Conjunctivae and lids are normal. Right eye exhibits no discharge. Left eye exhibits no discharge. No scleral icterus.   Sclera clear bilat   Neck: Trachea normal, normal range of motion, full passive range of motion without pain and phonation normal. Neck supple.   Cardiovascular: Normal rate, regular rhythm, normal heart sounds, intact distal pulses and normal pulses.   Pulmonary/Chest: Effort normal and breath sounds normal. No respiratory distress.   Abdominal: Soft. Normal appearance and bowel sounds are normal. She exhibits no distension, no pulsatile midline mass and no mass. There is tenderness in the suprapubic area. There is no rigidity, no rebound, no guarding, no CVA tenderness, no tenderness at McBurney's point and negative Baig's sign.   Musculoskeletal: Normal range of motion. She exhibits no edema or deformity.   Neurological: She is alert and oriented to person, place, and time. She exhibits normal muscle tone. Coordination normal.   Skin: Skin is warm, dry and intact. She is not diaphoretic. No pallor.   Psychiatric: She has a normal mood and affect. Her speech is normal and behavior is normal. Judgment and thought content normal. Cognition and memory are normal.   Nursing note and vitals reviewed.      Assessment:       1. Acute cystitis without hematuria    2. Dysuria        Plan:         Acute cystitis  "without hematuria  -     cefTRIAXone injection 1 g  -     cefdinir (OMNICEF) 300 MG capsule; Take 1 capsule (300 mg total) by mouth 2 (two) times daily. for 5 days  Dispense: 10 capsule; Refill: 0    Dysuria  -     POCT Urinalysis, Dipstick, Automated, W/O Scope  -     Urine culture        FU WITH UROLOGY AS DISCUSSED.     Bladder Infection, Female (Adult)    Urine is normally doesn't have any bacteria in it. But bacteria can get into the urinary tract from the skin around the rectum. Or they can travel in the blood from elsewhere in the body. Once they are in your urinary tract, they can cause infection in the urethra (urethritis), the bladder (cystitis), or the kidneys (pyelonephritis).  The most common place for an infection is in the bladder. This is called a bladder infection. This is one of the most common infections in women. Most bladder infections are easily treated. They are not serious unless the infection spreads to the kidney.  The phrases "bladder infection," "UTI," and "cystitis" are often used to describe the same thing. But they are not always the same. Cystitis is an inflammation of the bladder. The most common cause of cystitis is an infection.  Symptoms  The infection causes inflammation in the urethra and bladder. This causes many of the symptoms. The most common symptoms of a bladder infection are:  · Pain or burning when urinating  · Having to urinate more often than usual  · Urgent need to urinate  · Only a small amount of urine comes out  · Blood in urine  · Abdominal discomfort. This is usually in the lower abdomen above the pubic bone.  · Cloudy urine  · Strong- or bad-smelling urine  · Unable to urinate (urinary retention)  · Unable to hold urine in (urinary incontinence)  · Fever  · Loss of appetite  · Confusion (in older adults)  Causes  Bladder infections are not contagious. You can't get one from someone else, from a toilet seat, or from sharing a bath.  The most common cause of " bladder infections is bacteria from the bowels. The bacteria get onto the skin around the opening of the urethra. From there, they can get into the urine and travel up to the bladder, causing inflammation and infection. This usually happens because of:  · Wiping improperly after urinating. Always wipe from front to back.  · Bowel incontinence  · Pregnancy  · Procedures such as having a catheter inserted  · Older age  · Not emptying your bladder. This can allow bacteria a chance to grow in your urine.  · Dehydration  · Constipation  · Sex  · Use of a diaphragm for birth control   Treatment  Bladder infections are diagnosed by a urine test. They are treated with antibiotics and usually clear up quickly without complications. Treatment helps prevent a more serious kidney infection.  Medicines  Medicines can help in the treatment of a bladder infection:  · Take antibiotics until they are used up, even if you feel better. It is important to finish them to make sure the infection has cleared.  · You can use acetaminophen or ibuprofen for pain, fever, or discomfort, unless another medicine was prescribed. If you have chronic liver or kidney disease, talk with your healthcare provider before using these medicines. Also talk with your provider if you've ever had a stomach ulcer or gastrointestinal bleeding, or are taking blood-thinner medicines.  · If you are given phenazopydridine to reduce burning with urination, it will cause your urine to become a bright orange color. This can stain clothing.  Care and prevention  These self-care steps can help prevent future infections:  · Drink plenty of fluids to prevent dehydration and flush out your bladder. Do this unless you must restrict fluids for other health reasons, or your doctor told you not to.  · Proper cleaning after going to the bathroom is important. Wipe from front to back after using the toilet to prevent the spread of bacteria.  · Urinate more often. Don't try to  hold urine in for a long time.  · Wear loose-fitting clothes and cotton underwear. Avoid tight-fitting pants.  · Improve your diet and prevent constipation. Eat more fresh fruit and vegetables, and fiber, and less junk and fatty foods.  · Avoid sex until your symptoms are gone.  · Avoid caffeine, alcohol, and spicy foods. These can irritate your bladder.  · Urinate right after intercourse to flush out your bladder.  · If you use birth control pills and have frequent bladder infections, discuss it with your doctor.  Follow-up care  Call your healthcare provider if all symptoms are not gone after 3 days of treatment. This is especially important if you have repeat infections.  If a culture was done, you will be told if your treatment needs to be changed. If directed, you can call to find out the results.  If X-rays were done, you will be told if the results will affect your treatment.  Call 911  Call 911 if any of the following occur:  · Trouble breathing  · Hard to wake up or confusion  · Fainting or loss of consciousness  · Rapid heart rate  When to seek medical advice  Call your healthcare provider right away if any of these occur:  · Fever of 100.4ºF (38.0ºC) or higher, or as directed by your healthcare provider  · Symptoms are not better by the third day of treatment  · Back or belly (abdominal) pain that gets worse  · Repeated vomiting, or unable to keep medicine down  · Weakness or dizziness  · Vaginal discharge  · Pain, redness, or swelling in the outer vaginal area (labia)  Date Last Reviewed: 10/1/2016  © 8626-3403 [employer]. [address] [place] [address], [place] [address]. All rights reserved. This information is not intended as a substitute for professional medical care. Always follow your healthcare professional's instructions.      Please follow up with your Primary care provider within 2-5 days if your signs and symptoms have not resolved or worsen.     If your condition worsens or fails to  improve we recommend that you receive another evaluation at the emergency room immediately or contact your primary medical clinic to discuss your concerns.   You must understand that you have received an Urgent Care treatment only and that you may be released before all of your medical problems are known or treated. You, the patient, will arrange for follow up care as instructed.     RED FLAGS/WARNING SYMPTOMS DISCUSSED WITH PATIENT THAT WOULD WARRANT EMERGENT MEDICAL ATTENTION. PATIENT VERBALIZED UNDERSTANDING.

## 2019-01-05 NOTE — PATIENT INSTRUCTIONS

## 2019-01-07 ENCOUNTER — CLINICAL SUPPORT (OUTPATIENT)
Dept: REHABILITATION | Facility: OTHER | Age: 69
End: 2019-01-07
Attending: PHYSICAL MEDICINE & REHABILITATION
Payer: MEDICARE

## 2019-01-07 DIAGNOSIS — G89.29 CHRONIC LEFT-SIDED LOW BACK PAIN WITH LEFT-SIDED SCIATICA: Primary | ICD-10-CM

## 2019-01-07 DIAGNOSIS — M54.42 CHRONIC LEFT-SIDED LOW BACK PAIN WITH LEFT-SIDED SCIATICA: Primary | ICD-10-CM

## 2019-01-07 PROCEDURE — 97110 THERAPEUTIC EXERCISES: CPT

## 2019-01-07 NOTE — PROGRESS NOTES
[facility]      Name: Vivienne Jose  Clinic Number: [id number]    Date of Treatment: 01/07/2019     Diagnosis:   Encounter Diagnosis   Name Primary?    Chronic left-sided low back pain with left-sided sciatica Yes     Physician: Amelia Antunez, *       Precautions: L knee OA/pain, HTN, diabetes     Pattern of pain determined: 2     Evaluation Date: 10/22/2018  Authorization Period Expiration: 12/31/18  Plan of Care Expiration: 1/21/18  Reassessment Due: 1/21/19    Visit # / Visits authorized: 19/20  (NB: No lap belt on machine)   (Apprion computer not working, reps and weight changed in note only)  Medicare charges: (780)    Time In: 1410  Time Out: 1510   Total Billable Time: 60 minutes     Face to Face discussion of patient was done between PT and PTA.     Subjective   Vivienne arrives to PT today and maintains reports of her knee feeling better as well as her low back with a pain of 1/10.     Patient reports their pain to be 1/10 on a 0-10 scale with 0 being no pain and 10 being the worst pain imaginable.  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documents   Leisure: computer,                      Pts goals:  To be able to walk further    Objective      MOVEMENT LOSS 11/21/18    ROM Loss   Flexion min loss, no curve reversal   Extension moderate loss   Side bending Right minimal loss   Side bending Left minimal loss   Rotation Right minimal loss   Rotation Left minimal loss       Baseline Isometric Testing on Med X equipment: Testing administered by PT  Date of testing: 10/22/18  ROM 0-30 deg (increased to 36-0 on 11/27/18)    Max Peak Torque 117    Min Peak Torque 25    Flex/Ext Ratio 4.68:1   % below normative data 18   Counter weight 241   femur 6   Seat pad 0               CMS Impairment/Limitation/Restriction for FOTO lumbar Survey     Therapist reviewed FOTO scores for Vivienne Jose on 10/22/2018.   FOTO  documents entered into EPIC - see Media section.     Limitation Score: 63%  Category: Mobility     Current : CL = least 60% but < 80% impaired, limited or restricted  Goal: CK = at least 40% but < 60% impaired, limited or restricted    Visit 10: 60%              Treatment      Home Exercise Program as follows: see pt instructions for HEP. Seated flexion  Handouts were given to the patient. Pt demo good understanding of the education provided. Vivienne demonstrated good return demonstration of activities.        Vivienne received therapeutic exercises to develop/improve posture, lumbar  ROM, strength and muscular endurance for 60 minutes including the following exercises:     [facility] 1/7/2019   Visit Number 19   VAS Pain Rating 1   Time 10   Flexion in Lying 10   Flexion in Sitting -   Manual Therapy -   Lumbar Extension Seat Pad -   Femur Restraint -   Top Dead Center -   Counterweight -   Lumbar Flexion -   Lumbar Extension -   Lumbar Peak Torque -   Min Torque -   Test Percent Below Normative Data -   Test Percent Gain in Strength from Initial  -   Lumbar Weight 69   Repetitions 18   Rating of Perceived Exertion 4   Ice - Sitting 10         ANTONIETTA with ball x10 (NT)  LTR with ball x10 (NT)  PPT x10 (NT)  Seated piriformis stretch 3x 10 secs (Pt can only do when R knee is not hurting.)  NB: possibly No lap belt    HEP:  Seated flexion: x 15  Standing flexion : x 10  Supine flexion: x 10 with ball  Standing extension x 10  Prone press ups x 10  Added seated piriformis stretch 3x 10 secs (Pt can only do when R knee is not hurting.)          Assessment     Vivienne was provided with an additional  5% weight increase to 69#  Today. She completed 18 reps with an RPE of 4. She has demonstrated good tolerance to increasing lumbar  dynamic weight. She had no new c/o today. DC at next visit.     Patient is making good progress towards established goals. Pt is slowly progressing and she is compliant with her HEP, instructed  pt to utilize ice on R knee   Pt will continue to benefit from skilled outpatient physical therapy to address the deficits stated in the impairment chart, provide pt/family education and to maximize pt's level of independence in the home and community environment.       Pt's spiritual, cultural and educational needs considered and pt agreeable to plan of care and goals as stated below:     Medical necessity is demonstrated by the following IMPAIRMENTS/PROBLEMS:    Pt presents with the following impairments:      History  Co-morbidities and personal factors that may impact the plan of care Co-morbidities:   CAD, diabetes, HTN and OA, knee pain, obesity     Personal Factors:   coping style       moderate   Examination  Body Structures and Functions, activity limitations and participation restrictions that may impact the plan of care Body Regions:   back  lower extremities     Body Systems:    ROM  strength  gait     Participation Restrictions:   Walking, standing, house work, cooking showering     Activity limitations:   Learning and applying knowledge  no deficits     General Tasks and Commands  no deficits     Communication  no deficits     Mobility  lifting and carrying objects  walking     Self care  washing oneself (bathing, drying, washing hands)     Domestic Life  shopping  cooking  doing house work (cleaning house, washing dishes, laundry)     Interactions/Relationships  no deficits     Life Areas  no deficits     Community and Social Life  no deficits             moderate   Clinical Presentation stable and uncomplicated low   Decision Making/ Complexity Score: moderate         GOALS: Pt is in agreement with the following goals.     Short term goals:  6 weeks or 10 visits   1.  Pt will demonstrate increased lumbar ROM by at least 3 degrees from the initial ROM value with improvements noted in functional ROM and ability to perform ADLs Met 11/27/18  2.  Pt will demonstrate increased maximum isometric torque  value by 5% when compared to the initial value resulting in improved ability to perform bending, lifting, and carrying activities safely, confidently. (Progressing)     3.  Patient report a reduction in worst pain score by 1-2 points for improved tolerance during work and recreational activities (Progressing)  4.  Pt able to perform HEP correctly with minimal cueing or supervision for therapist (Progressing)        Long term goals: 13 weeks or 20 visits   1. Pt will demonstrate increased lumbar ROM by at least 6 degrees from initial ROM value, resulting in improved ability to perform functional fwd bending while standing and sitting. Met 11/27/18  2. Pt will demonstrate increased maximum isometric torque value by 10% when compared to the initial value resulting in improved ability to perform bending, lifting, and carrying activities safely, confidently. (Progressing)  3. Pt to demonstrate ability to independently control and reduce their pain through posture positioning and mechanical movements throughout a typical day. (Progressing)  4.  Patient will demonstrate improved overall function per FOTO Survey to CK = at least 40% but < 60% impaired, limited or restricted score or less.  5. Pt to begin a progressive walking program in order to increase her physical activity for ADLs and exercising. (Progressing)    Plan   Continue with established Plan of Care towards established PT goals.       Jax Vargas, PT  1/7/2019

## 2019-01-09 ENCOUNTER — CLINICAL SUPPORT (OUTPATIENT)
Dept: REHABILITATION | Facility: OTHER | Age: 69
End: 2019-01-09
Attending: PHYSICAL MEDICINE & REHABILITATION
Payer: MEDICARE

## 2019-01-09 DIAGNOSIS — G89.29 CHRONIC LEFT-SIDED LOW BACK PAIN WITH LEFT-SIDED SCIATICA: Primary | ICD-10-CM

## 2019-01-09 DIAGNOSIS — M54.42 CHRONIC LEFT-SIDED LOW BACK PAIN WITH LEFT-SIDED SCIATICA: Primary | ICD-10-CM

## 2019-01-09 LAB
BACTERIA UR CULT: ABNORMAL
BACTERIA UR CULT: ABNORMAL
OTHER ANTIBIOTIC SUSC ISLT: ABNORMAL

## 2019-01-09 PROCEDURE — 97110 THERAPEUTIC EXERCISES: CPT

## 2019-01-09 PROCEDURE — G8979 MOBILITY GOAL STATUS: HCPCS | Mod: CK

## 2019-01-09 PROCEDURE — G8980 MOBILITY D/C STATUS: HCPCS | Mod: CK

## 2019-01-09 NOTE — PROGRESS NOTES
[facility]      Name: Vivienne Jose  Clinic Number: [id number]    Date of Treatment: 2019     Diagnosis:   Encounter Diagnosis   Name Primary?    Chronic left-sided low back pain with left-sided sciatica Yes     Physician: Amelia Antunez, *       Precautions: L knee OA/pain, HTN, diabetes     Pattern of pain determined: 2     Evaluation Date: 10/22/2018  Authorization Period Expiration: 18  Plan of Care Expiration: 18  Reassessment Due: 19    Visit # / Visits authorized:   (NB: No lap belt on machine)   ([employer] computer not working, reps and weight changed in note only)    Time In:900am  Time Out: 100am  Total Billable Time: 60 minutes     Face to Face discussion of patient was done between PT and PTA.     Subjective   Vivienne arrives to PT with very minimal LBP.  Pt states knee is hurting worse and she is awaiting surgery in March    Patient reports their pain to be 1/10 on a 0-10 scale with 0 being no pain and 10 being the worst pain imaginable.  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documents   Leisure: computer,                      Pts goals:  To be able to walk further    Objective      MOVEMENT LOSS 19    ROM Loss   Flexion min loss, no curve reversal   Extension Min oss   Side bending Right minimal loss   Side bending Left minimal loss   Rotation Right minimal loss   Rotation Left minimal loss       Baseline Isometric Testing on Med X equipment: Testing administered by PT  Date of testing: 10/22/18  ROM 0-30 deg (increased to 36-0 on 18)    Max Peak Torque 117    Min Peak Torque 25    Flex/Ext Ratio 4.68:1   % below normative data 18   Counter weight 241   femur 6   Seat pad 0            Final  Isometric Testing on Med X equipment: Testing administered by PT  Date of testin19  ROM 0-36   Max Peak Torque 166   Min Peak Torque 45   Flex/Ext Ratio 3.68:1   % below  normative data 22+   Counter weight 241   femur 6   Seat pad 0      Increased strength 46%        CMS Impairment/Limitation/Restriction for FOTO lumbar Survey     Therapist reviewed FOTO scores for Vivienne Jose on 10/22/2018.   FOTO documents entered into [employer] - see Media section.     Limitation Score: 63%  Category: Mobility     Current : CL = least 60% but < 80% impaired, limited or restricted  Goal: CK = at least 40% but < 60% impaired, limited or restricted    Visit 10: 60%   visit 20 54%           Treatment      Home Exercise Program as follows: see pt instructions for HEP. Seated flexion  Handouts were given to the patient. Pt demo good understanding of the education provided. Vivienne demonstrated good return demonstration of activities.        Vivienne received therapeutic exercises to develop/improve posture, lumbar  ROM, strength and muscular endurance for 60 minutes including the following exercises:     [employer] Therapy 1/9/2019   Visit Number 20   VAS Pain Rating 1   Time 10   Flexion in Lying 10   Flexion in Sitting -   Manual Therapy -   Lumbar Extension Seat Pad -   Femur Restraint -   Top Dead Center -   Counterweight -   Lumbar Flexion -   Lumbar Extension -   Lumbar Peak Torque 166   Min Torque 45   Test Percent Below Normative Data -   Test Percent Gain in Strength from Initial  43   Lumbar Weight -   Repetitions -   Rating of Perceived Exertion -   Ice - Sitting 10       ANTONIETTA with ball x10 (NT)  LTR with ball x10 (NT)  PPT x10 (NT)  Seated piriformis stretch 3x 10 secs (Pt can only do when R knee is not hurting.)  NB: possibly No lap belt    HEP:  Seated flexion: x 15  Standing flexion : x 10  Supine flexion: x 10 with ball  Standing extension x 10  Prone press ups x 10  Added seated piriformis stretch 3x 10 secs (Pt can only do when R knee is not hurting.)          Assessment     Patient has attended 20 visits of the [employer] program for aerobic work, med ex isometric testing with dynamic  strengthening on med ex equipment for spine, whole body strengthening on med ex equipment, HEP, education.  Patient has completed Healthy Back Program and is ready to be transitioned into wellness program.  Importance of wellness program, and attending 2/month to maintain strength stressed.  Importance of continuing there ex and body mech and ergonomics stressed.   Patient demonstrates improvement in ability to reduce symptoms, improved posture, improved ROM and improved strength.   Reviewed HEP, and importance of maintaining a healthy spine through continued stretching and performance of HEP, good posture, good ergonomics, good body mech with ADL, leisure, and work.  Discharge handout with HEP given, and discussed aspects of exercise program, cardio, strengthening, and stretching.    Patient expressed understanding information given.  Patient plans to attend wellness and is ready to transition to wellness.      Pt's spiritual, cultural and educational needs considered and pt agreeable to plan of care and goals as stated below:     Medical necessity is demonstrated by the following IMPAIRMENTS/PROBLEMS:    Pt presents with the following impairments:      History  Co-morbidities and personal factors that may impact the plan of care Co-morbidities:   CAD, diabetes, HTN and OA, knee pain, obesity     Personal Factors:   coping style       moderate   Examination  Body Structures and Functions, activity limitations and participation restrictions that may impact the plan of care Body Regions:   back  lower extremities     Body Systems:    ROM  strength  gait     Participation Restrictions:   Walking, standing, house work, cooking showering     Activity limitations:   Learning and applying knowledge  no deficits     General Tasks and Commands  no deficits     Communication  no deficits     Mobility  lifting and carrying objects  walking     Self care  washing oneself (bathing, drying, washing hands)     Domestic  Life  shopping  cooking  doing house work (cleaning house, washing dishes, laundry)     Interactions/Relationships  no deficits     Life Areas  no deficits     Community and Social Life  no deficits             moderate   Clinical Presentation stable and uncomplicated low   Decision Making/ Complexity Score: moderate         GOALS: Pt is in agreement with the following goals.     Short term goals:  6 weeks or 10 visits   1.  Pt will demonstrate increased lumbar ROM by at least 3 degrees from the initial ROM value with improvements noted in functional ROM and ability to perform ADLs Met 11/27/18  2.  Pt will demonstrate increased maximum isometric torque value by 5% when compared to the initial value resulting in improved ability to perform bending, lifting, and carrying activities safely, confidently. (MET     3.  Patient report a reduction in worst pain score by 1-2 points for improved tolerance during work and recreational activities (MET  4.  Pt able to perform HEP correctly with minimal cueing or supervision for therapist MET        Long term goals: 13 weeks or 20 visits   1. Pt will demonstrate increased lumbar ROM by at least 6 degrees from initial ROM value, resulting in improved ability to perform functional fwd bending while standing and sitting. Met 11/27/18  2. Pt will demonstrate increased maximum isometric torque value by 10% when compared to the initial value resulting in improved ability to perform bending, lifting, and carrying activities safely, confidently. MET  3. Pt to demonstrate ability to independently control and reduce their pain through posture positioning and mechanical movements throughout a typical day. MET  4.  Patient will demonstrate improved overall function per FOTO Survey to CK = at least 40% but < 60% impaired, limited or restricted score or less. MET  5. Pt to begin a progressive walking program in order to increase her physical activity for ADLs and exercising. MET    DC        Ariana Clifford, PT  1/9/2019

## 2019-01-10 ENCOUNTER — PATIENT MESSAGE (OUTPATIENT)
Dept: UROLOGY | Facility: CLINIC | Age: 69
End: 2019-01-10

## 2019-01-11 ENCOUNTER — TELEPHONE (OUTPATIENT)
Dept: NEUROLOGY | Facility: CLINIC | Age: 69
End: 2019-01-11

## 2019-01-11 ENCOUNTER — CLINICAL SUPPORT (OUTPATIENT)
Dept: INTERNAL MEDICINE | Facility: CLINIC | Age: 69
End: 2019-01-11
Payer: MEDICARE

## 2019-01-11 ENCOUNTER — OFFICE VISIT (OUTPATIENT)
Dept: UROLOGY | Facility: CLINIC | Age: 69
End: 2019-01-11
Payer: MEDICARE

## 2019-01-11 VITALS
HEART RATE: 85 BPM | WEIGHT: 240.94 LBS | HEIGHT: 62 IN | BODY MASS INDEX: 44.34 KG/M2 | DIASTOLIC BLOOD PRESSURE: 72 MMHG | SYSTOLIC BLOOD PRESSURE: 139 MMHG

## 2019-01-11 DIAGNOSIS — J30.2 SEASONAL ALLERGIC RHINITIS, UNSPECIFIED TRIGGER: ICD-10-CM

## 2019-01-11 DIAGNOSIS — N39.0 RECURRENT UTI: Primary | ICD-10-CM

## 2019-01-11 PROCEDURE — 1101F PR PT FALLS ASSESS DOC 0-1 FALLS W/OUT INJ PAST YR: ICD-10-PCS | Mod: CPTII,S$GLB,, | Performed by: UROLOGY

## 2019-01-11 PROCEDURE — 99213 PR OFFICE/OUTPT VISIT, EST, LEVL III, 20-29 MIN: ICD-10-PCS | Mod: 25,S$GLB,, | Performed by: UROLOGY

## 2019-01-11 PROCEDURE — 99499 UNLISTED E&M SERVICE: CPT | Mod: HCNC,S$GLB,, | Performed by: UROLOGY

## 2019-01-11 PROCEDURE — 3075F PR MOST RECENT SYSTOLIC BLOOD PRESS GE 130-139MM HG: ICD-10-PCS | Mod: CPTII,S$GLB,, | Performed by: UROLOGY

## 2019-01-11 PROCEDURE — 99499 NO LOS: ICD-10-PCS | Mod: S$GLB,,, | Performed by: ALLERGY & IMMUNOLOGY

## 2019-01-11 PROCEDURE — 3075F SYST BP GE 130 - 139MM HG: CPT | Mod: CPTII,S$GLB,, | Performed by: UROLOGY

## 2019-01-11 PROCEDURE — 3078F PR MOST RECENT DIASTOLIC BLOOD PRESSURE < 80 MM HG: ICD-10-PCS | Mod: CPTII,S$GLB,, | Performed by: UROLOGY

## 2019-01-11 PROCEDURE — 99499 RISK ADDL DX/OHS AUDIT: ICD-10-PCS | Mod: HCNC,S$GLB,, | Performed by: UROLOGY

## 2019-01-11 PROCEDURE — 99213 OFFICE O/P EST LOW 20 MIN: CPT | Mod: 25,S$GLB,, | Performed by: UROLOGY

## 2019-01-11 PROCEDURE — 51701 PR INSERTION OF NON-INDWELLING BLADDER CATHETERIZATION FOR RESIDUAL UR: ICD-10-PCS | Mod: S$GLB,,, | Performed by: UROLOGY

## 2019-01-11 PROCEDURE — 95115 PR IMMUNOTHERAPY, ONE INJECTION: ICD-10-PCS | Mod: S$GLB,,, | Performed by: FAMILY MEDICINE

## 2019-01-11 PROCEDURE — 51701 INSERT BLADDER CATHETER: CPT | Mod: S$GLB,,, | Performed by: UROLOGY

## 2019-01-11 PROCEDURE — 3078F DIAST BP <80 MM HG: CPT | Mod: CPTII,S$GLB,, | Performed by: UROLOGY

## 2019-01-11 PROCEDURE — 1101F PT FALLS ASSESS-DOCD LE1/YR: CPT | Mod: CPTII,S$GLB,, | Performed by: UROLOGY

## 2019-01-11 PROCEDURE — 99499 UNLISTED E&M SERVICE: CPT | Mod: S$GLB,,, | Performed by: ALLERGY & IMMUNOLOGY

## 2019-01-11 PROCEDURE — 99999 PR PBB SHADOW E&M-EST. PATIENT-LVL V: ICD-10-PCS | Mod: PBBFAC,,, | Performed by: UROLOGY

## 2019-01-11 PROCEDURE — 95115 IMMUNOTHERAPY ONE INJECTION: CPT | Mod: S$GLB,,, | Performed by: FAMILY MEDICINE

## 2019-01-11 PROCEDURE — 99999 PR PBB SHADOW E&M-EST. PATIENT-LVL V: CPT | Mod: PBBFAC,,, | Performed by: UROLOGY

## 2019-01-11 NOTE — TELEPHONE ENCOUNTER
Returned patients message pertaining to her scheduled EMG.  I spoke with her  who advised to keep the appt. As is.

## 2019-01-11 NOTE — PROGRESS NOTES
CHIEF COMPLAINT:    Mrs. Jose is a 68 y.o. female presenting for a follow up for recurrent UTI.    PRESENTING ILLNESS:    Vivienne Jose is a 68 y.o. female who returns after messaging that she had another UTI, in review of her chart, she has had a positive urine culture monthly since July 2018.  They have been pan sensitive E coli.    She is presently taking or using Ellura, Premarin vaginal cream, probiotic.  She was taking methamine but it was ineffective.    She states her bowels are normal.  She does have a history of diabetes mellitus most recent HgA1c was 7.7 on 11/12/2018.  Urinary symptoms include hesitancy, has to lean forward to urinate.     REVIEW OF SYSTEMS:    Review of Systems   Constitutional: Negative.    HENT: Negative.    Eyes: Negative.    Respiratory: Negative.    Cardiovascular: Negative.    Gastrointestinal: Negative.    Genitourinary: Positive for dysuria and urgency.   Musculoskeletal: Positive for back pain.   Skin: Negative.    Neurological: Negative.    Endo/Heme/Allergies: Negative.    Psychiatric/Behavioral: Negative.        PATIENT HISTORY:    Past Medical History:   Diagnosis Date    Allergy     Angio-edema     Arthritis     Cataract     Cerebrovascular malformation     Colon polyps     Coronary artery disease     Diabetes mellitus, type 2     Diabetic peripheral neuropathy     GERD (gastroesophageal reflux disease)     Herpes infection     Hyperlipidemia     Hypertension     Hypothyroidism     Kidney stones     Nausea & vomiting 11/23/2015    Obesity, morbid     Ovarian cyst     Pyelonephritis, chronic     Seizure disorder, focal motor     Sleep apnea     Type II or unspecified type diabetes mellitus with neurological manifestations, uncontrolled(250.62)     Urinary tract infection     Urticaria     Vaginal infection        Past Surgical History:   Procedure Laterality Date    CARPAL TUNNEL RELEASE Right 2014    COLONOSCOPY      COLONOSCOPY N/A 8/29/2014     [facility]    COLONOSCOPY Golytely N/A 9/13/2017    [facility]    CYST REMOVAL      skin; multiples    CYSTOSCOPY WITH STENT PLACEMENT Left 12/28/2016    [facility]    ENDOSCOPIC-RELEASE-CARPAL TUNNEL right Right 7/8/2014    [facility]    EXTRACORPOREAL SHOCK WAVE LITHOTRIPSY  2002    EXTRACTION-STONE-URETEROSCOPY Left 12/28/2016    [facility]    HYSTERECTOMY  1984    KIDNEY STONE SURGERY      LITHOTRIPSY-LASER Left 12/28/2016    [facility]    RELEASE-FINGER-TRIGGER right long finger Right 7/8/2014    [facility]    THYROIDECTOMY  1977         TONSILLECTOMY, ADENOIDECTOMY      TRIGGER FINGER RELEASE      TUBAL LIGATION         Family History   Problem Relation Age of Onset    Cancer Father     Arthritis Father     Gout Father     Allergies Son     Allergic rhinitis Son     Skin cancer Mother     Macular degeneration Mother     Dementia Mother     Hypertension Mother     No Known Problems Daughter     Diabetes Daughter     No Known Problems Daughter     Glaucoma Maternal Aunt         Great Maternal Aunt    Leukemia Maternal Uncle      Socioeconomic History    Marital status:    Occupational History    Occupation: retired     Employer: [employer]   Tobacco Use    Smoking status: Never Smoker    Smokeless tobacco: Never Used   Substance and Sexual Activity    Alcohol use: No     Alcohol/week: 0.0 oz    Drug use: No    Sexual activity: Yes     Partners: Male       Allergies:  Sulfa (sulfonamide antibiotics); Ciprofloxacin; Januvia [sitagliptin]; Lisinopril; Lotensin [benazepril]; and Nexium [esomeprazole magnesium]    Medications:  Outpatient Encounter Medications as of 1/11/2019   Medication Sig  Dispense Refill    amLODIPine (NORVASC) 5 MG tablet Take 1 tablet (5 mg total) by mouth once daily. 90 tablet 3    ascorbic acid, vitamin C, (VITAMIN C) 500 mg Chew       aspirin (ECOTRIN) 81 MG EC tablet Take 81 mg by mouth once daily.      azelastine (ASTELIN) 137 mcg (0.1 %) nasal spray 2 sprays (274 mcg total) by Nasal route 2 (two) times daily. 90 mL 4    biotin 10,000 mcg Cap Take by mouth.      blood sugar diagnostic (TRUE METRIX GLUCOSE TEST STRIP) Strp TEST TWO TIMES DAILY 200 strip 6    blood-glucose meter Misc [employer] True Metrix Air meter 1 each 0    calcium carbonate-vitamin D3 600 mg(1,500mg) -100 unit Cap Take 1 tablet by mouth once daily.      [] cefdinir (OMNICEF) 300 MG capsule Take 1 capsule (300 mg total) by mouth 2 (two) times daily. for 5 days 10 capsule 0    chlorthalidone (HYGROTEN) 25 MG Tab Take 1 tablet (25 mg total) by mouth once daily. 90 tablet 1    diclofenac sodium (VOLTAREN) 1 % Gel Apply 2 g topically 4 (four) times daily. 3 Tube 3    econazole nitrate 1 % cream Mix with hydrocortisone cream 1% 30 grams use bid prn 60 g 3    fluticasone (FLONASE) 50 mcg/actuation nasal spray 2 sprays (100 mcg total) by Each Nare route once daily. 48 g 4    gabapentin (NEURONTIN) 300 MG capsule Take 2 capsules (600 mg total) by mouth 2 (two) times daily. 180 capsule 1    glucagon (human recombinant) inj 1mg/mL kit Inject 1 mL (1 mg total) into the muscle as needed. 1 kit 6    insulin detemir U-100 (LEVEMIR FLEXTOUCH U-100 INSULN) 100 unit/mL (3 mL) SubQ InPn pen Inject 18 Units into the skin every evening. 1 Box 6    ketotifen (ZADITOR) 0.025 % (0.035 %) ophthalmic solution Place 1-2 drops into both eyes once daily.      L.acid-B.bifidum-B.animal-FOS (PROBIOTIC COMPLEX) 25 billion cell -100 mg Cap Take 1 capsule by mouth once daily.      lancets (TRUEPLUS LANCETS) 28 gauge [facility] Inject 1 lancet into the skin 2 (two) times daily before meals. 200 each 6    levothyroxine  "(SYNTHROID) 75 MCG tablet TAKE 1 TABLET EVERY DAY 90 tablet 3    liraglutide 0.6 mg/0.1 mL, 18 mg/3 mL, subq PNIJ (VICTOZA 3-TAWANA) 0.6 mg/0.1 mL (18 mg/3 mL) PnIj INJECT 1.8 MG INTO THE SKIN ONCE DAILY. 27 mL 11    loratadine (CLARITIN) 10 mg tablet Take 10 mg by mouth once daily.      LORazepam (ATIVAN) 0.5 MG tablet TK 1 T PO 1 HOUR B MRI  0    lovastatin (MEVACOR) 40 MG tablet TAKE 1 TABLET NIGHTLY (SUBSTITUTED FOR MEVACOR) 90 tablet 1    magnesium oxide-Mg AA chelate (MAGNESIUM, AMINO ACID CHELATE,) 133 mg Tab Take by mouth as directed.       metFORMIN (GLUCOPHAGE) 1000 MG tablet Take 1 tablet (1,000 mg total) by mouth 2 (two) times daily with meals. 180 tablet 3    omeprazole (PRILOSEC) 20 MG capsule Take 1 capsule (20 mg total) by mouth daily as needed. 90 capsule 1    pen needle, diabetic (BD ULTRA-FINE EDUARDO PEN NEEDLES) 32 gauge x 5/32" Ndle USE WITH VICTOZA AND LEVEMIR (2 INJECTIONS EVERY DAY) 100 each 3    potassium chloride SA (K-DUR,KLOR-CON) 10 MEQ tablet Take 10 meq PO QD except on Mondays, Wednesdays and Fridays, on which she should take 2 tabs (20 meq)  tablet 1    PREMARIN vaginal cream Place 0.5 g vaginally 3 (three) times a week. 30 g 3    repaglinide (PRANDIN) 2 MG tablet Take 1 tablet (2 mg total) by mouth 3 (three) times daily before meals. 270 tablet 3    valACYclovir (VALTREX) 500 MG tablet Take 1 tablet (500 mg total) by mouth once daily. 90 tablet 1    valsartan (DIOVAN) 160 MG tablet Take 1 tablet (160 mg total) by mouth 2 (two) times daily. 180 tablet 1     Facility-Administered Encounter Medications as of 1/11/2019   Medication Dose Route Frequency Provider Last Rate Last Dose    Allergy Mix   Subcutaneous 1 time in Clinic/HOD Ailin Mills MD             PHYSICAL EXAMINATION:    The patient generally appears in good health, is appropriately interactive, and is in no apparent distress.    Skin: No lesions.    Mental: Cooperative with normal affect.    Neuro: " Grossly intact.    HEENT: Normal. No evidence of lymphadenopathy.    Chest:  normal inspiratory effort.    Abdomen:  Soft, non-tender. No masses or organomegaly. Bladder is not palpable. No evidence of flank discomfort. No evidence of inguinal hernia.    Extremities: No clubbing, cyanosis, or edema    Normal external female genitalia  Urethral meatus is normal  Urethra and bladder are nontender to bimanual exam  Well supported anteriorly and posteriorly   Uterus and cervix are normal  No adnexal masses  PVR by catheterization was 30 ml      LABS:    Lab Results   Component Value Date    BUN 13 11/12/2018    CREATININE 0.9 11/12/2018     UA 1.010 pH 7, tr blood, otherwise, negative    IMPRESSION:    Encounter Diagnoses   Name Primary?    Recurrent UTI Yes       PLAN:    1.  CT RSS   2.  Recommended better diabetic control  3.  Continue the premarin cream, ellura and probiotic

## 2019-01-11 NOTE — PROGRESS NOTES
Patient, Vivienne Jose (MRN #[id number]), presented with a recorded BMI of 44.07 kg/m^2 consistent with the definition of morbid obesity (ICD-10 E66.01). The patient's morbid obesity was monitored, evaluated, addressed and/or treated. This addendum to the medical record is made on 01/11/2019.

## 2019-01-14 ENCOUNTER — TELEPHONE (OUTPATIENT)
Dept: NEUROLOGY | Facility: CLINIC | Age: 69
End: 2019-01-14

## 2019-01-16 ENCOUNTER — PATIENT OUTREACH (OUTPATIENT)
Dept: OTHER | Facility: OTHER | Age: 69
End: 2019-01-16

## 2019-01-16 NOTE — PROGRESS NOTES
Last 5 Patient Entered Readings                                      Current 30 Day Average: 137/70     Recent Readings 1/15/2019 1/15/2019 1/13/2019 1/13/2019 1/13/2019    SBP (mmHg) 124 124 144 144 150    DBP (mmHg) 63 63 70 70 73    Pulse 72 72 66 66 69          Last 6 Patient Entered Readings                                          Most Recent A1c: 7.7% on 11/12/2018  (Goal: 7%)     Recent Readings 1/16/2019 1/15/2019 1/15/2019 1/14/2019 1/14/2019    Blood Glucose (mg/dL) 133 173 144 160 210        Digital Medicine: Health  Follow Up    Lifestyle Modifications:    1.Dietary Modifications (Sodium intake <2,000mg/day, food labels, dining out): Mrs. Jose reports that she has started [employer] on Monday. She reports feeling not as hungry since starting these meals. She is trying to adjust to the meal plan to avoid skipping meals. Her goal is to lose some weight prior to her knee replacement surgery in March.    2.Physical Activity: Patient reports that she has not gotten back into the gym yet. She was attending physical therapy for her back, which ended last week. She will have knee replacement surgery on March 6. She is wanting to get back to the gym soon to help strengthen her back and knee.     3.Medication Therapy: Patient has been compliant with the medication regimen.    4.Patient has the following medication side effects/concerns: None  (Frequency/Alleviating factors/Precipitating factors, etc.)     Follow up with Mrs. Vivienne Jose completed. Mrs. Jose is doing okay. Patient reports working towards getting her BP and BG under better control. She has no questions for improving dietary habits or physical activity currently. Will continue to follow up to achieve health goals.

## 2019-01-18 ENCOUNTER — HOSPITAL ENCOUNTER (OUTPATIENT)
Dept: RADIOLOGY | Facility: HOSPITAL | Age: 69
Discharge: HOME | End: 2019-01-18
Attending: UROLOGY
Payer: MEDICARE

## 2019-01-18 DIAGNOSIS — N39.0 RECURRENT UTI: ICD-10-CM

## 2019-01-18 PROCEDURE — 74176 CT ABD & PELVIS W/O CONTRAST: CPT | Mod: 26,,, | Performed by: RADIOLOGY

## 2019-01-18 PROCEDURE — 74176 CT ABD & PELVIS W/O CONTRAST: CPT | Mod: TC

## 2019-01-18 PROCEDURE — 74176 CT RENAL STONE STUDY ABD PELVIS WO: ICD-10-PCS | Mod: 26,,, | Performed by: RADIOLOGY

## 2019-01-23 ENCOUNTER — CLINICAL SUPPORT (OUTPATIENT)
Dept: ALLERGY | Facility: CLINIC | Age: 69
End: 2019-01-23
Payer: MEDICARE

## 2019-01-23 DIAGNOSIS — J30.9 CHRONIC ALLERGIC RHINITIS: ICD-10-CM

## 2019-01-23 PROCEDURE — 95165 ANTIGEN THERAPY SERVICES: CPT | Mod: HCNC,S$GLB,, | Performed by: ALLERGY & IMMUNOLOGY

## 2019-01-23 PROCEDURE — 99499 NO LOS: ICD-10-PCS | Mod: HCNC,S$GLB,, | Performed by: ALLERGY & IMMUNOLOGY

## 2019-01-23 PROCEDURE — 99499 UNLISTED E&M SERVICE: CPT | Mod: HCNC,S$GLB,, | Performed by: ALLERGY & IMMUNOLOGY

## 2019-01-23 PROCEDURE — 95165 PR PROFES SVC,IMMUNOTHER,SINGLE/MULT AGS: ICD-10-PCS | Mod: HCNC,S$GLB,, | Performed by: ALLERGY & IMMUNOLOGY

## 2019-01-29 DIAGNOSIS — Z79.4 TYPE 2 DIABETES MELLITUS WITH STAGE 3 CHRONIC KIDNEY DISEASE, WITH LONG-TERM CURRENT USE OF INSULIN: ICD-10-CM

## 2019-01-29 DIAGNOSIS — N18.30 TYPE 2 DIABETES MELLITUS WITH STAGE 3 CHRONIC KIDNEY DISEASE, WITH LONG-TERM CURRENT USE OF INSULIN: ICD-10-CM

## 2019-01-29 DIAGNOSIS — E11.22 TYPE 2 DIABETES MELLITUS WITH STAGE 3 CHRONIC KIDNEY DISEASE, WITH LONG-TERM CURRENT USE OF INSULIN: ICD-10-CM

## 2019-01-29 DIAGNOSIS — Z79.4 TYPE 2 DIABETES MELLITUS WITHOUT COMPLICATION, WITH LONG-TERM CURRENT USE OF INSULIN: ICD-10-CM

## 2019-01-29 DIAGNOSIS — E03.9 HYPOTHYROIDISM, UNSPECIFIED TYPE: ICD-10-CM

## 2019-01-29 DIAGNOSIS — E11.9 TYPE 2 DIABETES MELLITUS WITHOUT COMPLICATION, WITH LONG-TERM CURRENT USE OF INSULIN: ICD-10-CM

## 2019-01-29 RX ORDER — METFORMIN HYDROCHLORIDE 1000 MG/1
1000 TABLET ORAL 2 TIMES DAILY WITH MEALS
Qty: 180 TABLET | Refills: 3 | Status: SHIPPED | OUTPATIENT
Start: 2019-01-29 | End: 2019-07-22 | Stop reason: SDUPTHER

## 2019-01-29 RX ORDER — LEVOTHYROXINE SODIUM 75 UG/1
75 TABLET ORAL DAILY
Qty: 90 TABLET | Refills: 3 | Status: SHIPPED | OUTPATIENT
Start: 2019-01-29 | End: 2019-08-05 | Stop reason: SDUPTHER

## 2019-01-30 ENCOUNTER — PROCEDURE VISIT (OUTPATIENT)
Dept: NEUROLOGY | Facility: CLINIC | Age: 69
End: 2019-01-30
Payer: MEDICARE

## 2019-01-30 ENCOUNTER — DOCUMENTATION ONLY (OUTPATIENT)
Dept: REHABILITATION | Facility: OTHER | Age: 69
End: 2019-01-30
Attending: PHYSICAL MEDICINE & REHABILITATION
Payer: MEDICARE

## 2019-01-30 DIAGNOSIS — M54.17 LUMBOSACRAL RADICULOPATHY: ICD-10-CM

## 2019-01-30 PROCEDURE — 95886 MUSC TEST DONE W/N TEST COMP: CPT | Mod: HCNC,S$GLB,, | Performed by: PSYCHIATRY & NEUROLOGY

## 2019-01-30 PROCEDURE — 95910 NRV CNDJ TEST 7-8 STUDIES: CPT | Mod: HCNC,S$GLB,, | Performed by: PSYCHIATRY & NEUROLOGY

## 2019-01-30 PROCEDURE — 95910 PR NERVE CONDUCTION STUDY; 7-8 STUDIES: ICD-10-PCS | Mod: HCNC,S$GLB,, | Performed by: PSYCHIATRY & NEUROLOGY

## 2019-01-30 PROCEDURE — 95886 PR EMG COMPLETE, W/ NERVE CONDUCTION STUDIES, 5+ MUSCLES: ICD-10-PCS | Mod: HCNC,S$GLB,, | Performed by: PSYCHIATRY & NEUROLOGY

## 2019-01-30 RX ORDER — INSULIN DETEMIR 100 [IU]/ML
INJECTION, SOLUTION SUBCUTANEOUS
Qty: 15 ML | Refills: 1 | OUTPATIENT
Start: 2019-01-30

## 2019-01-30 NOTE — PROGRESS NOTES
"Health  Wellness Visit Note    Name: Vivienne Jose  Clinic Number: [id number]  Physician: Amelia Antunez, *  Diagnosis: No diagnosis found.  Past Medical History:   Diagnosis Date    Allergy     Angio-edema     Arthritis     Cataract     Cerebrovascular malformation     Colon polyps     Coronary artery disease     Diabetes mellitus, type 2     Diabetic peripheral neuropathy     GERD (gastroesophageal reflux disease)     Herpes infection     Hyperlipidemia     Hypertension     Hypothyroidism     Kidney stones     Nausea & vomiting 11/23/2015    Obesity, morbid     Ovarian cyst     Pyelonephritis, chronic     Seizure disorder, focal motor     Sleep apnea     Type II or unspecified type diabetes mellitus with neurological manifestations, uncontrolled(250.62)     Urinary tract infection     Urticaria     Vaginal infection      Visit Number: 21  Precautions: L knee OA/pain, HTN, diabetes  6 Month: April 2019  12 Month: October 2019  Time In: 4:00PM  Time Out: 4:45PM  Total Treatment Time: 45minutes    Subjective:   Patient reports feeling ok today. It has been about 3 weeks since she graduated from [employer]. She said she "has not been a good girl since graduating". She has not exercised or done her stretches. She has had other medical issues with her knee arise, so she has been trying to fix that. She may be undergoing knee surgery in the next month, so her goal is to get stronger before then. Pt met HC at [facility] today to learn  on her own. Pt did fairly well getting in and out of machines. Pt is fairly strong, just a little un sturdy on her feet. HC stressed the importance of feeling comfortable on the machines, and also stressed caution getting on and off and moving between machines. Pt seems confident working out on her own. Her  will also be with her.      Objective:   Vivienne completed the following exercise machines: core lumbar, leg extension, leg curl, upright row, " chest press, biceps curl, triceps extension, leg press, hip adduction, and hip abduction.     Assessment:   Patient did well on machines and seems confident in  Setting up machines on her own.     Plan:  Pt will continue on her own at [facility].    Health  : Dionne Enrique, PCT  1/30/2019

## 2019-01-31 ENCOUNTER — OFFICE VISIT (OUTPATIENT)
Dept: NEUROLOGY | Facility: CLINIC | Age: 69
End: 2019-01-31
Payer: MEDICARE

## 2019-01-31 VITALS
DIASTOLIC BLOOD PRESSURE: 72 MMHG | SYSTOLIC BLOOD PRESSURE: 128 MMHG | HEIGHT: 62 IN | HEART RATE: 83 BPM | WEIGHT: 238.13 LBS | BODY MASS INDEX: 43.82 KG/M2

## 2019-01-31 DIAGNOSIS — M79.606 PAIN OF LOWER EXTREMITY, UNSPECIFIED LATERALITY: Primary | ICD-10-CM

## 2019-01-31 PROCEDURE — 99213 OFFICE O/P EST LOW 20 MIN: CPT | Mod: HCNC,S$GLB,, | Performed by: PSYCHIATRY & NEUROLOGY

## 2019-01-31 PROCEDURE — 3074F SYST BP LT 130 MM HG: CPT | Mod: HCNC,CPTII,S$GLB, | Performed by: PSYCHIATRY & NEUROLOGY

## 2019-01-31 PROCEDURE — 3078F DIAST BP <80 MM HG: CPT | Mod: HCNC,CPTII,S$GLB, | Performed by: PSYCHIATRY & NEUROLOGY

## 2019-01-31 PROCEDURE — 1101F PT FALLS ASSESS-DOCD LE1/YR: CPT | Mod: HCNC,CPTII,S$GLB, | Performed by: PSYCHIATRY & NEUROLOGY

## 2019-01-31 PROCEDURE — 3074F PR MOST RECENT SYSTOLIC BLOOD PRESSURE < 130 MM HG: ICD-10-PCS | Mod: HCNC,CPTII,S$GLB, | Performed by: PSYCHIATRY & NEUROLOGY

## 2019-01-31 PROCEDURE — 99999 PR PBB SHADOW E&M-EST. PATIENT-LVL V: CPT | Mod: PBBFAC,HCNC,, | Performed by: PSYCHIATRY & NEUROLOGY

## 2019-01-31 PROCEDURE — 99999 PR PBB SHADOW E&M-EST. PATIENT-LVL V: ICD-10-PCS | Mod: PBBFAC,HCNC,, | Performed by: PSYCHIATRY & NEUROLOGY

## 2019-01-31 PROCEDURE — 99213 PR OFFICE/OUTPT VISIT, EST, LEVL III, 20-29 MIN: ICD-10-PCS | Mod: HCNC,S$GLB,, | Performed by: PSYCHIATRY & NEUROLOGY

## 2019-01-31 PROCEDURE — 1101F PR PT FALLS ASSESS DOC 0-1 FALLS W/OUT INJ PAST YR: ICD-10-PCS | Mod: HCNC,CPTII,S$GLB, | Performed by: PSYCHIATRY & NEUROLOGY

## 2019-01-31 PROCEDURE — 3078F PR MOST RECENT DIASTOLIC BLOOD PRESSURE < 80 MM HG: ICD-10-PCS | Mod: HCNC,CPTII,S$GLB, | Performed by: PSYCHIATRY & NEUROLOGY

## 2019-02-01 ENCOUNTER — OFFICE VISIT (OUTPATIENT)
Dept: ORTHOPEDICS | Facility: CLINIC | Age: 69
End: 2019-02-01
Payer: MEDICARE

## 2019-02-01 VITALS — WEIGHT: 238 LBS | HEIGHT: 62 IN | BODY MASS INDEX: 43.79 KG/M2

## 2019-02-01 DIAGNOSIS — M17.11 PRIMARY OSTEOARTHRITIS OF RIGHT KNEE: Primary | ICD-10-CM

## 2019-02-01 PROCEDURE — 99999 PR PBB SHADOW E&M-EST. PATIENT-LVL III: CPT | Mod: PBBFAC,HCNC,, | Performed by: ORTHOPAEDIC SURGERY

## 2019-02-01 PROCEDURE — 99213 PR OFFICE/OUTPT VISIT, EST, LEVL III, 20-29 MIN: ICD-10-PCS | Mod: HCNC,S$GLB,, | Performed by: ORTHOPAEDIC SURGERY

## 2019-02-01 PROCEDURE — 99213 OFFICE O/P EST LOW 20 MIN: CPT | Mod: HCNC,S$GLB,, | Performed by: ORTHOPAEDIC SURGERY

## 2019-02-01 PROCEDURE — 99999 PR PBB SHADOW E&M-EST. PATIENT-LVL III: ICD-10-PCS | Mod: PBBFAC,HCNC,, | Performed by: ORTHOPAEDIC SURGERY

## 2019-02-01 PROCEDURE — 1101F PR PT FALLS ASSESS DOC 0-1 FALLS W/OUT INJ PAST YR: ICD-10-PCS | Mod: HCNC,CPTII,S$GLB, | Performed by: ORTHOPAEDIC SURGERY

## 2019-02-01 PROCEDURE — 1101F PT FALLS ASSESS-DOCD LE1/YR: CPT | Mod: HCNC,CPTII,S$GLB, | Performed by: ORTHOPAEDIC SURGERY

## 2019-02-01 NOTE — PROGRESS NOTES
Neurology Clinic Visit  Primary Care Provider: [unfilled]   Referring Provider: [unfilled]  Date of Visit: 01/31/2019       chief complaint:   Chief Complaint   Patient presents with    Follow-up       Interval history   Patient presents for follow-up.  She reports that her pain has resolved and she no longer has burning neuropathic pain.  She is taking gabapentin 600 mg b.i.d..  She had EMG nerve conduction study for possible lumbar sacral radiculopathy but there was no evidence of lumbar sacral radiculopathy on the test.     EMG/NCS:  This is a minimally abnormal study. There is electrophysiologic evidence of:   1. Bilateral and primarily axonal sensory neuropathies in the lower extremities. These findings may be secondary to technical challenges with the study (habitus and edema).     There was no electrographic evidence of radiculopathy in either lower extremity.         Patient Active Problem List    Diagnosis Date Noted    Hypothyroidism 11/16/2018    Needs flu shot 11/16/2018    Hematuria 11/16/2018    Chronic left-sided low back pain with left-sided sciatica 10/22/2018    Interstitial cystitis 09/21/2018    Gait instability 07/16/2018    Fatigue 06/21/2018    Type 2 diabetes mellitus with stage 3 chronic kidney disease, with long-term current use of insulin 02/23/2018    Chronic kidney disease, stage III (moderate) 02/23/2018    Diverticulosis of intestine without bleeding 08/02/2017    Skin nodule 08/02/2017    Hypokalemia 04/24/2017    Diabetic polyneuropathy associated with type 2 diabetes mellitus 04/24/2017    Recurrent UTI 03/02/2017    Nephrolithiasis 12/14/2016    Gastroesophageal reflux disease 07/22/2016    Hyperlipidemia 07/22/2016    Chronic pain of right knee 05/02/2016    Recurrent HSV (herpes simplex virus) 03/21/2016    Hypocalcemia 02/29/2016    Morbid obesity with body mass index (BMI) of 40.0 to 44.9 in adult 02/29/2016    Preventative health care 09/22/2015     Osteopenia 09/22/2015    Allergic rhinitis 09/22/2015    Type 2 diabetes mellitus, uncontrolled 09/22/2015    Hypertension associated with diabetes 09/22/2015    Hypothyroidism due to acquired atrophy of thyroid 09/22/2015    Vitamin D deficiency 09/22/2015    Sleep apnea 09/22/2015    Sacroiliitis 06/09/2015    Lumbar facet arthropathy 06/09/2015    Physical deconditioning 06/09/2015    Dyslipidemia associated with type 2 diabetes mellitus 04/28/2015    BAM (obstructive sleep apnea) 04/28/2015    Proteinuria 02/03/2015    Pain in limb 07/24/2014    Stiffness of joint 07/24/2014    Muscle weakness 07/24/2014    Carpal tunnel syndrome of right wrist 07/08/2014    Nuclear sclerosis - Both Eyes 04/02/2013    Chronic allergic rhinitis 11/30/2012    Post-surgical hypothyroidism 11/23/2012    CAD (coronary artery disease) 11/23/2012    Kidney stones 08/24/2012    HTN (hypertension) 08/24/2012    Trigger middle finger of right hand 07/30/2012    Bilateral carotid artery disease 07/26/2011    Facet arthropathy, cervical 07/25/2011     Past Medical History:   Diagnosis Date    Allergy     Angio-edema     Arthritis     Cataract     Cerebrovascular malformation     Colon polyps     Coronary artery disease     Diabetes mellitus, type 2     Diabetic peripheral neuropathy     GERD (gastroesophageal reflux disease)     Herpes infection     Hyperlipidemia     Hypertension     Hypothyroidism     Kidney stones     Nausea & vomiting 11/23/2015    Obesity, morbid     Ovarian cyst     Pyelonephritis, chronic     Seizure disorder, focal motor     Sleep apnea     Type II or unspecified type diabetes mellitus with neurological manifestations, uncontrolled(250.62)     Urinary tract infection     Urticaria     Vaginal infection      Past Surgical History:   Procedure Laterality Date    CARPAL TUNNEL RELEASE Right 2014    COLONOSCOPY      COLONOSCOPY N/A 8/29/2014    Performed by  [facility]ela Wall [facility] at [facility]    COLONOSCOPY Golytely N/A 9/13/2017    Per[facility] at [facility] END[facility]    CYST REMOVAL      skin; multiples    CYSTOSCOPY WITH STENT PLACEMENT Left 12/28/2016    [facility] Greg [facility]ons [facility] at [facility] OR    ENDOSCOPIC-RELEASE-CARPAL TUNNEL right Right 7/8/2014    Per[facility] at [facility] OR [facility]ST [facility]    EXTRACORPOREAL SHOCK WAVE LITHOTRIPSY  2002    EXTRACTION-STONE-URETEROSCOPY Left 12/28/2016    [facility] at [facility] OR    HYSTERECTOMY  1984    KIDNEY STONE SURGERY      LITHOTRIPSY-LASER Left 12/28/2016    [facility] at [facility] OR    RELEASE-FINGER-TRIGGER right long finger Right 7/8/2014    Per[facility] by Riley [facility] at [facility]    THYROIDECTOMY  1977         TONSILLECTOMY, ADENOIDECTOMY      TRIGGER FINGER RELEASE      TUBAL LIGATION       Family History   Problem Relation Age of Onset    Cancer Father     Arthritis Father     Gout Father     Allergies Son     Allergic rhinitis Son     Skin cancer Mother     Macular degeneration Mother     Dementia Mother     Hypertension Mother     No Known Problems Daughter     Diabetes Daughter     No Known Problems Daughter     Glaucoma Maternal Aunt         Great Maternal Aunt    Leukemia Maternal Uncle     Amblyopia Neg Hx     Cataracts Neg Hx     Retinal detachment Neg Hx     Strabismus Neg Hx     Stroke Neg Hx     Thyroid disease Neg Hx     Kidney disease Neg Hx     Angioedema Neg Hx     Asthma Neg Hx     Atopy Neg Hx     Eczema Neg Hx     Immunodeficiency Neg Hx     Rhinitis Neg Hx     Urticaria Neg Hx          Current Outpatient Medications   Medication Sig    amLODIPine (NORVASC) 5 MG tablet Take 1 tablet (5 mg total) by mouth once daily.    ascorbic acid, vitamin C, (VITAMIN C) 500 mg Chew     aspirin (ECOTRIN) 81 MG EC tablet Take 81 mg by mouth once daily.    azelastine (ASTELIN) 137  mcg (0.1 %) nasal spray 2 sprays (274 mcg total) by Nasal route 2 (two) times daily.    biotin 10,000 mcg Cap Take by mouth.    blood sugar diagnostic (TRUE METRIX GLUCOSE TEST STRIP) Strp TEST TWO TIMES DAILY    blood-glucose meter Misc [employer] True Metrix Air meter    calcium carbonate-vitamin D3 600 mg(1,500mg) -100 unit Cap Take 1 tablet by mouth once daily.    chlorthalidone (HYGROTEN) 25 MG Tab Take 1 tablet (25 mg total) by mouth once daily.    econazole nitrate 1 % cream Mix with hydrocortisone cream 1% 30 grams use bid prn    fluticasone (FLONASE) 50 mcg/actuation nasal spray 2 sprays (100 mcg total) by Each Nare route once daily.    gabapentin (NEURONTIN) 300 MG capsule Take 2 capsules (600 mg total) by mouth 2 (two) times daily.    insulin detemir U-100 (LEVEMIR FLEXTOUCH U-100 INSULN) 100 unit/mL (3 mL) SubQ InPn pen Inject 18 Units into the skin every evening.    ketotifen (ZADITOR) 0.025 % (0.035 %) ophthalmic solution Place 1-2 drops into both eyes once daily.    L.acid-B.bifidum-B.animal-FOS (PROBIOTIC COMPLEX) 25 billion cell -100 mg Cap Take 1 capsule by mouth once daily.    lancets (TRUEPLUS LANCETS) 28 gauge Misc Inject 1 lancet into the skin 2 (two) times daily before meals.    levothyroxine (SYNTHROID) 75 MCG tablet Take 1 tablet (75 mcg total) by mouth once daily.    liraglutide 0.6 mg/0.1 mL, 18 mg/3 mL, subq PNIJ (VICTOZA 3-TAWANA) 0.6 mg/0.1 mL (18 mg/3 mL) PnIj INJECT 1.8 MG INTO THE SKIN ONCE DAILY.    loratadine (CLARITIN) 10 mg tablet Take 10 mg by mouth once daily.    LORazepam (ATIVAN) 0.5 MG tablet TK 1 T PO 1 HOUR B MRI    lovastatin (MEVACOR) 40 MG tablet TAKE 1 TABLET NIGHTLY (SUBSTITUTED FOR MEVACOR)    magnesium oxide-Mg AA chelate (MAGNESIUM, AMINO ACID CHELATE,) 133 mg Tab Take by mouth as directed.     metFORMIN (GLUCOPHAGE) 1000 MG tablet Take 1 tablet (1,000 mg total) by mouth 2 (two) times daily with meals.    omeprazole (PRILOSEC) 20 MG capsule Take 1  "capsule (20 mg total) by mouth daily as needed.    pen needle, diabetic (BD ULTRA-FINE EDUARDO PEN NEEDLES) 32 gauge x 5/32" Ndle USE WITH VICTOZA AND LEVEMIR (2 INJECTIONS EVERY DAY)    potassium chloride SA (K-DUR,KLOR-CON) 10 MEQ tablet Take 10 meq PO QD except on Mondays, Wednesdays and Fridays, on which she should take 2 tabs (20 meq) QD    PREMARIN vaginal cream Place 0.5 g vaginally 3 (three) times a week.    repaglinide (PRANDIN) 2 MG tablet Take 1 tablet (2 mg total) by mouth 3 (three) times daily before meals.    valACYclovir (VALTREX) 500 MG tablet Take 1 tablet (500 mg total) by mouth once daily.    valsartan (DIOVAN) 160 MG tablet Take 1 tablet (160 mg total) by mouth 2 (two) times daily.    diclofenac sodium (VOLTAREN) 1 % Gel Apply 2 g topically 4 (four) times daily.    glucagon (human recombinant) inj 1mg/mL kit Inject 1 mL (1 mg total) into the muscle as needed.     Current Facility-Administered Medications   Medication    Allergy Mix       Scheduled Meds:   Allergy Mix   Subcutaneous 1 time in Clinic/HOD     Continuous Infusions:  PRN Meds:    Review of patient's allergies indicates:   Allergen Reactions    Sulfa (sulfonamide antibiotics) Nausea Only    Ciprofloxacin     Januvia [sitagliptin]      abd pain    Lisinopril     Lotensin [benazepril]     Nexium [esomeprazole magnesium] Other (See Comments)     Gas     Social History     Socioeconomic History    Marital status:      Spouse name: Not on file    Number of children: Not on file    Years of education: Not on file    Highest education level: Not on file   Social Needs    Financial resource strain: Not on file    Food insecurity - worry: Not on file    Food insecurity - inability: Not on file    Transportation needs - medical: Not on file    Transportation needs - non-medical: Not on file   Occupational History    Occupation: retired     Employer: [employer]   Tobacco Use    Smoking status: Never " "Smoker    Smokeless tobacco: Never Used   Substance and Sexual Activity    Alcohol use: No     Alcohol/week: 0.0 oz    Drug use: No    Sexual activity: Yes     Partners: Male   Other Topics Concern    Are you pregnant or think you may be? Not Asked    Breast-feeding Not Asked   Social History Narrative    Not on file       Review of Systems  Constitutional: negative  Eyes: negative  Ears, nose, mouth, throat, and face: negative  Respiratory: negative  Cardiovascular: negative  Gastrointestinal: negative  Genitourinary:negative  Integument/breast: negative  Hematologic/lymphatic: negative  Musculoskeletal:negative  Neurological: negative  Behavioral/Psych: negative  Endocrine: negative  Allergic/Immunologic: negative    Objective:  Vital signs in last 24 hours:    Vitals:    01/31/19 1344   BP: 128/72   Pulse: 83   Weight: 108 kg (238 lb 1.6 oz)   Height: 5' 2" (1.575 m)     General: no acute distress, well nourished, well-groomed  CVS: RRR, no murmur, gallops or rubs  Respiratory: Clear to ausculation  Extremities: no edema     Neurological Examination:     HIGHER INTEGRATIVE FUNCTIONS:  -Normal attention span and concentration; immediately responds to questions and commands  -Oriented to time, place and person  -Recent and remote memory intact  -Language normal (no aphasia or dysarthria)  -Normal fund of knowledge     CN:  -PERRLA, visual fields full, unable to visualize optic discs due to small pupils on fundus exam  -EOMI with normal saccades and smooth pursuit  -Facial sensation intact bilaterally  -Facial strength/movement intact bilaterally  -Hearing intact to voice  -Palate elevates symmetrically  -Normal shoulder shrug and head turn  -Tongue protrudes midline     MOTOR: (left/right graded 1-5)  -UE: 5/5 deltoids; 5/5 biceps, triceps; 5/5 wrist flexors, extensors; 5/5 interosseous; 5/5   -LEs: 5/5 hip flexion, extension; 5/5 knee flexion, extension; 5/5 ankle flexion, extension  -Tone: normal  -No " pronator drift, no orbiting     SENSORY:  -Light touch intact     REFLEXES:  -2+ upper and lower bilaterally; absent ankle reflex on left  -Flexor plantar reflex bilaterally  -No clonus     COORDINATION:  -FNF, HKS intact bilaterally     GAIT:  -atalgic gait    Assessment/Plan:     1. Chronic lower back pain, with radiating features, suspect lumbosacral radiculopathy  2. History of right carpal tunnel syndrome s/p surgery.  3. Obesity  4. DMII, per PCP  5. HTN, per PCP  6. Neuropathic pain, resolved     Plan:  Patient reports that she is much improved.  She denies further neuropathic pain. She is currently taking gabapentin 600 mg b.i.d..  I advised the patient that she can still take 600 mg at night.  I asked the patient to notify me if her symptoms worsen.  She can follow up as needed.     I discussed the assessment and plan with the patient and answered the questions that she and her  had.        Plan:  Will obtain EMG/NCS of lower extremities  Continue gabapentin for neuropathic pain  Will check B12 and folate     I discussed the assessment and plan with the patient and answered the questions that she and her  had.

## 2019-02-01 NOTE — PROGRESS NOTES
Subjective:      Patient ID: Vivienne Jose is a 68 y.o. female.    Chief Complaint: Pain and Consult of the Right Knee    HPI follow-up for osteoarthritis.  Patient is considering arthroplasty. She reports intermittent improvement after taking gabapentin but still has difficulty with prolonged walking beyond 5/10 minutes.    Review of Systems   Constitution: Negative for fever and weight loss.   HENT: Negative for congestion.    Eyes: Negative for visual disturbance.   Cardiovascular: Negative for chest pain.   Respiratory: Negative for shortness of breath.    Hematologic/Lymphatic: Negative for bleeding problem. Does not bruise/bleed easily.   Skin: Negative for poor wound healing.   Musculoskeletal: Positive for joint pain.   Gastrointestinal: Negative for abdominal pain.   Genitourinary: Negative for dysuria.   Neurological: Negative for seizures.   Psychiatric/Behavioral: Negative for altered mental status.   Allergic/Immunologic: Negative for persistent infections.         Objective:            Ortho/SPM Exam  Right knee    [unfilled]    The patient is not in acute distress.   Sclerae normal  Body habitus is obese.  Respiratory distress:  none   The patient walks with a significant limp.  Hip irritability  negative.   The skin over the knee is intact.  Knee effusion trace  Tendernes is located medially  Range of motion- Flexion 125 deg, Extension 0 deg,   Ligament laxity exam:   MCL 1+   Lachman negative   Post sag negative    LCL 0  Patellar apprehension negative.  Popliteal cyst negative  Patellar crepitation absent.  Flexion/pinch not applicable  Pulses DP intact, PT intact.  Motor normal 5/5 strength in all tested muscle groups.   Sensory normal.          Assessment:       No diagnosis found.       The patient's radiographic findings are advanced and her gait is decompensated.  Plan:       There are no diagnoses linked to this encounter.    I explained my diagnostic impression and the reasoning behind it in  detail, using layman's terms.  Models and/or pictures were used to help in the explanation.    I reviewed the pros and cons of the procedure. We discussed continued nonsurgical care. I offered the possibility of referral for outside opinions and consideration of a robotic procedure. I discussed the evidence based data on the subject.  After this discussion the patient and her  wish to proceed with the planned right knee replacement

## 2019-02-03 DIAGNOSIS — Z79.4 TYPE 2 DIABETES MELLITUS WITH STAGE 3 CHRONIC KIDNEY DISEASE, WITH LONG-TERM CURRENT USE OF INSULIN: ICD-10-CM

## 2019-02-03 DIAGNOSIS — N18.30 TYPE 2 DIABETES MELLITUS WITH STAGE 3 CHRONIC KIDNEY DISEASE, WITH LONG-TERM CURRENT USE OF INSULIN: ICD-10-CM

## 2019-02-03 DIAGNOSIS — E11.22 TYPE 2 DIABETES MELLITUS WITH STAGE 3 CHRONIC KIDNEY DISEASE, WITH LONG-TERM CURRENT USE OF INSULIN: ICD-10-CM

## 2019-02-03 DIAGNOSIS — B00.9 RECURRENT HSV (HERPES SIMPLEX VIRUS): ICD-10-CM

## 2019-02-03 DIAGNOSIS — E78.5 HYPERLIPIDEMIA, UNSPECIFIED HYPERLIPIDEMIA TYPE: ICD-10-CM

## 2019-02-04 RX ORDER — INSULIN DETEMIR 100 [IU]/ML
INJECTION, SOLUTION SUBCUTANEOUS
Qty: 15 ML | Refills: 6 | Status: SHIPPED | OUTPATIENT
Start: 2019-02-04 | End: 2019-02-08

## 2019-02-04 RX ORDER — LOVASTATIN 40 MG/1
TABLET ORAL
Qty: 90 TABLET | Refills: 0 | Status: SHIPPED | OUTPATIENT
Start: 2019-02-04 | End: 2019-04-27 | Stop reason: SDUPTHER

## 2019-02-04 RX ORDER — PEN NEEDLE, DIABETIC 30 GX3/16"
NEEDLE, DISPOSABLE MISCELLANEOUS
Qty: 100 EACH | Refills: 3 | Status: SHIPPED | OUTPATIENT
Start: 2019-02-04 | End: 2019-08-05 | Stop reason: SDUPTHER

## 2019-02-04 RX ORDER — VALACYCLOVIR HYDROCHLORIDE 500 MG/1
TABLET, FILM COATED ORAL
Qty: 90 TABLET | Refills: 0 | Status: SHIPPED | OUTPATIENT
Start: 2019-02-04 | End: 2019-04-13 | Stop reason: SDUPTHER

## 2019-02-06 ENCOUNTER — TELEPHONE (OUTPATIENT)
Dept: NEUROLOGY | Facility: CLINIC | Age: 69
End: 2019-02-06

## 2019-02-06 NOTE — PROGRESS NOTES
Subjective:      Patient ID: Vivienne Jose is a 68 y.o. female.    Chief Complaint:  Diabetes    History of Present Illness  Vivienne Jose is presenting today for DM type 2 & hypothyroidism follow up.     She is having a right knee replacement in March    A1c decreased since last visit to 7.0%, below goal of 7.5%    With regards to the diabetes:  Diagnosed with DM in her early 50s   She was told she had prediabetes in her 30s     Current regimen:  Levemir 18 units PM  Victoza 1.8 mg qd  Metformin 1000 mg BID  repaglinide 2 mg TID AC    Missed doses? No     2 times a day testing  Log reviewed: yes, See media tab   's  Occ will have 200's     Eats 3 meals a day. Has improved diet greatly since last visit   Snacks rarely  Drinks water    Exercise - going to try to go to yoga      Hypoglycemic event? None   Corrects with juice   Has glucagon emergency kit     Known complications PN and nephropathy    Education - last visit: 05/2015     Diabetes Management Status  Statin: Taking  ACE/ARB: Taking  Screening or Prevention Patient's value Goal Complete/Controlled?   HgA1C Testing and Control   Lab Results   Component Value Date    HGBA1C 7.7 (H) 11/12/2018    Annually/Less than 7.5% Yes   Lipid profile : 07/27/2018 Annually Yes   LDL control Lab Results   Component Value Date    LDLCALC 65.2 07/27/2018    Annually/Less than 100 mg/dl  Yes   Nephropathy screening Lab Results   Component Value Date    LABMICR 4.0 07/27/2018     Lab Results   Component Value Date    PROTEINUA Negative 11/16/2018    Annually Yes   Blood pressure BP Readings from Last 1 Encounters:   02/08/19 (!) 148/82    Less than 140/90 Yes   Dilated retinal exam : 05/07/2018 Annually Yes   Foot exam   : 11/16/2018 Annually Yes     With regards to her hypothyroidism:  In 1978 she had right lobectomy for goiter and multiple cysts     Current medication:  generic lt4 75 mcg      Ref. Range 7/27/2018 07:24   TSH Latest Ref Range: 0.400 - 4.000 uIU/mL 1.780      takes thyroid medication properly without food first thing in the morning.   current symptoms:   Denies weight gain  Denies Fatigue   Denies Constipation   Denies Hair loss  Denies Brittle nails  Denies Mental fog     Last BMD dated 01/5/2018  Impression    Borderline osteopenia of the lumbar spine (slightly improved from prior exam) and osteopenia of the femoral necks (slightly decreased from prior exam).      Ref. Range 7/27/2018 07:24   Vit D, 25-Hydroxy Latest Ref Range: 30 - 96 ng/mL 41     Has BAM using cpap    Last thyroid US in 2018  FINDINGS:  History of thyroidectomy.    Right thyroid lobe measures 2.6 x 1.7 x 1.2 cm.  Parenchyma is homogeneous.  No microcalcifications.    Left thyroid lobe measures 3.4 x 1.3 x 1.5 cm.  Hypoechoic nodule measuring 1.1 cm and subcentimeter cystic nodule noted.    Isthmus measures 0.3 cm. Parenchyma is homogeneous.  No microcalcifications.    No evidence for cervical lymphadenopathy.      Impression     Residual thyroid, status post thyroidectomy.  Left thyroid nodule not meeting criteria for fine-needle aspiration.       Review of Systems   Constitutional: Negative for unexpected weight change.   Eyes: Negative for visual disturbance.   Respiratory: Negative for shortness of breath.    Cardiovascular: Negative for chest pain.   Gastrointestinal: Negative for abdominal pain.   Endocrine: Negative for cold intolerance, heat intolerance, polydipsia, polyphagia and polyuria.   Musculoskeletal: Positive for gait problem (uses cane). Negative for arthralgias.   Skin: Negative for wound.   Neurological: Negative for headaches.   Hematological: Does not bruise/bleed easily.   Psychiatric/Behavioral: Negative for sleep disturbance.     Objective:   Physical Exam   Neck: No thyromegaly present.   Cardiovascular: Normal rate.   No edema present   Pulmonary/Chest: Effort normal.   Abdominal: Soft.   Vitals reviewed.  Foot exam deferred done 11/2018  injection sites are ok. No lipo  hypertropthy or atrophy    Body mass index is 43.51 kg/m².    Lab Review:   Lab Results   Component Value Date    HGBA1C 7.7 (H) 11/12/2018     Lab Results   Component Value Date    CHOL 140 07/27/2018    HDL 48 07/27/2018    LDLCALC 65.2 07/27/2018    TRIG 134 07/27/2018    CHOLHDL 34.3 07/27/2018     Lab Results   Component Value Date     11/12/2018    K 3.7 11/12/2018     11/12/2018    CO2 28 11/12/2018     (H) 11/12/2018    BUN 13 11/12/2018    CREATININE 0.9 11/12/2018    CALCIUM 9.7 11/12/2018    PROT 7.2 11/12/2018    ALBUMIN 3.2 (L) 11/12/2018    BILITOT 0.4 11/12/2018    ALKPHOS 77 11/12/2018    AST 30 11/12/2018    ALT 29 11/12/2018    ANIONGAP 12 11/12/2018    ESTGFRAFRICA >60.0 11/12/2018    EGFRNONAA >60.0 11/12/2018    TSH 1.780 07/27/2018     Assessment and Plan     1. Diabetic polyneuropathy associated with type 2 diabetes mellitus  insulin degludec (TRESIBA FLEXTOUCH U-100) 100 unit/mL (3 mL) InPn    Hemoglobin A1c    Hemoglobin A1c    Comprehensive metabolic panel   2. Type 2 diabetes mellitus with stage 3 chronic kidney disease, with long-term current use of insulin     3. Post-surgical hypothyroidism  TSH   4. BAM (obstructive sleep apnea)     5. Osteopenia, unspecified location     6. Hypertension associated with diabetes     7. Dyslipidemia associated with type 2 diabetes mellitus     8. Morbid obesity with body mass index (BMI) of 40.0 to 44.9 in adult     9. Vitamin D deficiency       Labs today     Diabetic polyneuropathy associated with type 2 diabetes mellitus  -- Continue gabapentin and following with podiatry.  -- Educated patient on proper comfortable foot wear, to always wear dry socks, never walk barefoot, never cut toenails too short, never test bath water with feet, encouraged patient to inspect feet daily for wounds., and if patient applies lotion to this feet to always go around the foot and not in between toes.     Type 2 diabetes mellitus with stage 3 chronic  kidney disease, with long-term current use of insulin  - A1c goal 7.5%.   -Reviewed goals of therapy are to get the best control we can without hypoglycemia  - Discussed diet and exercise at length today   Medication changes:   Continue: Metformin & victoza  Stop repaglinide & levemir  Start tresiba 22u HS.   - Reviewed patient's current insulin regimen. Clarified proper insulin dose and timing in relation to meals, etc. Insulin injection sites and proper rotation instructed.     -Advised frequent self blood glucose monitoring.  Patient encouraged to document glucose results and bring them to every clinic visit    -Hypoglycemia precautions discussed. Instructed on precautions before driving.    -Close adherence to lifestyle changes recommended.   -Periodic follow ups for eye evaluations, foot care and dental care suggested.    Post-surgical hypothyroidism  - Clinically and biochemically euthyroid  - Continue lt4 75 mcg daily   - Goal is a normal TSH  - Check TFTs and adjust dosage accordingly   - Avoid exogenous hyperthyroidism as this can accelerate bone loss and increase risk of CV complications.  - Advised to take LT4 on an empty stomach with water and to wait 30-45 minutes before eating or taking other medications   - Reviewed that symptoms of hypothyroidism may not correlate with tsh, and a normal TSH is the goal of therapy.....  symptoms are not a justification for over treatment     BAM on CPAP  - Continue CPAP use     Osteopenia  -- borderline osteopenic  -- continue Ca & vit D supplement     Hypertension associated with diabetes  - Tolerating ARB  - Controlled     Dyslipidemia associated with type 2 diabetes mellitus  - Controlled   -  statin per ADA recommendations    Morbid obesity with BMI of 40.0-44.9, adult  -- encouraged dietary and lifestyle modifications   -- emphasized weight loss goals     Vitamin D deficiency  - continue over the counter 2000 IU a day     Follow-up in about 5 months (around  7/8/2019).  Labs today   A1c only prior to next appointment with me

## 2019-02-08 ENCOUNTER — OFFICE VISIT (OUTPATIENT)
Dept: ENDOCRINOLOGY | Facility: CLINIC | Age: 69
End: 2019-02-08
Payer: MEDICARE

## 2019-02-08 ENCOUNTER — PES CALL (OUTPATIENT)
Dept: ADMINISTRATIVE | Facility: CLINIC | Age: 69
End: 2019-02-08

## 2019-02-08 ENCOUNTER — PATIENT MESSAGE (OUTPATIENT)
Dept: ALLERGY | Facility: CLINIC | Age: 69
End: 2019-02-08

## 2019-02-08 VITALS
WEIGHT: 237.88 LBS | HEART RATE: 70 BPM | DIASTOLIC BLOOD PRESSURE: 82 MMHG | SYSTOLIC BLOOD PRESSURE: 148 MMHG | BODY MASS INDEX: 43.77 KG/M2 | HEIGHT: 62 IN

## 2019-02-08 DIAGNOSIS — E11.42 DIABETIC POLYNEUROPATHY ASSOCIATED WITH TYPE 2 DIABETES MELLITUS: Primary | ICD-10-CM

## 2019-02-08 DIAGNOSIS — I15.2 HYPERTENSION ASSOCIATED WITH DIABETES: ICD-10-CM

## 2019-02-08 DIAGNOSIS — E11.22 TYPE 2 DIABETES MELLITUS WITH STAGE 3 CHRONIC KIDNEY DISEASE, WITH LONG-TERM CURRENT USE OF INSULIN: ICD-10-CM

## 2019-02-08 DIAGNOSIS — G47.33 OSA (OBSTRUCTIVE SLEEP APNEA): ICD-10-CM

## 2019-02-08 DIAGNOSIS — M85.80 OSTEOPENIA, UNSPECIFIED LOCATION: ICD-10-CM

## 2019-02-08 DIAGNOSIS — Z79.4 TYPE 2 DIABETES MELLITUS WITH STAGE 3 CHRONIC KIDNEY DISEASE, WITH LONG-TERM CURRENT USE OF INSULIN: ICD-10-CM

## 2019-02-08 DIAGNOSIS — N18.30 TYPE 2 DIABETES MELLITUS WITH STAGE 3 CHRONIC KIDNEY DISEASE, WITH LONG-TERM CURRENT USE OF INSULIN: ICD-10-CM

## 2019-02-08 DIAGNOSIS — E11.69 DYSLIPIDEMIA ASSOCIATED WITH TYPE 2 DIABETES MELLITUS: ICD-10-CM

## 2019-02-08 DIAGNOSIS — E66.01 MORBID OBESITY WITH BODY MASS INDEX (BMI) OF 40.0 TO 44.9 IN ADULT: ICD-10-CM

## 2019-02-08 DIAGNOSIS — E78.5 DYSLIPIDEMIA ASSOCIATED WITH TYPE 2 DIABETES MELLITUS: ICD-10-CM

## 2019-02-08 DIAGNOSIS — E89.0 POST-SURGICAL HYPOTHYROIDISM: ICD-10-CM

## 2019-02-08 DIAGNOSIS — E55.9 VITAMIN D DEFICIENCY: ICD-10-CM

## 2019-02-08 DIAGNOSIS — E11.59 HYPERTENSION ASSOCIATED WITH DIABETES: ICD-10-CM

## 2019-02-08 PROCEDURE — 3079F PR MOST RECENT DIASTOLIC BLOOD PRESSURE 80-89 MM HG: ICD-10-PCS | Mod: HCNC,CPTII,S$GLB, | Performed by: NURSE PRACTITIONER

## 2019-02-08 PROCEDURE — 99214 PR OFFICE/OUTPT VISIT, EST, LEVL IV, 30-39 MIN: ICD-10-PCS | Mod: HCNC,S$GLB,, | Performed by: NURSE PRACTITIONER

## 2019-02-08 PROCEDURE — 3045F PR MOST RECENT HEMOGLOBIN A1C LEVEL 7.0-9.0%: CPT | Mod: HCNC,CPTII,S$GLB, | Performed by: NURSE PRACTITIONER

## 2019-02-08 PROCEDURE — 99999 PR PBB SHADOW E&M-EST. PATIENT-LVL III: ICD-10-PCS | Mod: PBBFAC,HCNC,, | Performed by: NURSE PRACTITIONER

## 2019-02-08 PROCEDURE — 3079F DIAST BP 80-89 MM HG: CPT | Mod: HCNC,CPTII,S$GLB, | Performed by: NURSE PRACTITIONER

## 2019-02-08 PROCEDURE — 1101F PT FALLS ASSESS-DOCD LE1/YR: CPT | Mod: HCNC,CPTII,S$GLB, | Performed by: NURSE PRACTITIONER

## 2019-02-08 PROCEDURE — 99499 UNLISTED E&M SERVICE: CPT | Mod: HCNC,S$GLB,, | Performed by: NURSE PRACTITIONER

## 2019-02-08 PROCEDURE — 3077F PR MOST RECENT SYSTOLIC BLOOD PRESSURE >= 140 MM HG: ICD-10-PCS | Mod: HCNC,CPTII,S$GLB, | Performed by: NURSE PRACTITIONER

## 2019-02-08 PROCEDURE — 3045F PR MOST RECENT HEMOGLOBIN A1C LEVEL 7.0-9.0%: ICD-10-PCS | Mod: HCNC,CPTII,S$GLB, | Performed by: NURSE PRACTITIONER

## 2019-02-08 PROCEDURE — 3077F SYST BP >= 140 MM HG: CPT | Mod: HCNC,CPTII,S$GLB, | Performed by: NURSE PRACTITIONER

## 2019-02-08 PROCEDURE — 99214 OFFICE O/P EST MOD 30 MIN: CPT | Mod: HCNC,S$GLB,, | Performed by: NURSE PRACTITIONER

## 2019-02-08 PROCEDURE — 99499 RISK ADDL DX/OHS AUDIT: ICD-10-PCS | Mod: HCNC,S$GLB,, | Performed by: NURSE PRACTITIONER

## 2019-02-08 PROCEDURE — 99999 PR PBB SHADOW E&M-EST. PATIENT-LVL III: CPT | Mod: PBBFAC,HCNC,, | Performed by: NURSE PRACTITIONER

## 2019-02-08 PROCEDURE — 1101F PR PT FALLS ASSESS DOC 0-1 FALLS W/OUT INJ PAST YR: ICD-10-PCS | Mod: HCNC,CPTII,S$GLB, | Performed by: NURSE PRACTITIONER

## 2019-02-08 RX ORDER — INSULIN DEGLUDEC 100 U/ML
22 INJECTION, SOLUTION SUBCUTANEOUS NIGHTLY
Qty: 15 SYRINGE | Refills: 4 | Status: SHIPPED | OUTPATIENT
Start: 2019-02-08 | End: 2019-07-30 | Stop reason: SDUPTHER

## 2019-02-11 ENCOUNTER — CLINICAL SUPPORT (OUTPATIENT)
Dept: INTERNAL MEDICINE | Facility: CLINIC | Age: 69
End: 2019-02-11
Payer: MEDICARE

## 2019-02-11 DIAGNOSIS — J30.9 CHRONIC ALLERGIC RHINITIS: ICD-10-CM

## 2019-02-11 PROCEDURE — 99499 NO LOS: ICD-10-PCS | Mod: HCNC,S$GLB,, | Performed by: ALLERGY & IMMUNOLOGY

## 2019-02-11 PROCEDURE — 99499 UNLISTED E&M SERVICE: CPT | Mod: HCNC,S$GLB,, | Performed by: ALLERGY & IMMUNOLOGY

## 2019-02-11 PROCEDURE — 95115 PR IMMUNOTHERAPY, ONE INJECTION: ICD-10-PCS | Mod: HCNC,S$GLB,, | Performed by: FAMILY MEDICINE

## 2019-02-11 PROCEDURE — 95115 IMMUNOTHERAPY ONE INJECTION: CPT | Mod: HCNC,S$GLB,, | Performed by: FAMILY MEDICINE

## 2019-02-14 ENCOUNTER — TELEPHONE (OUTPATIENT)
Dept: NEPHROLOGY | Facility: CLINIC | Age: 69
End: 2019-02-14

## 2019-02-14 DIAGNOSIS — N20.0 KIDNEY STONES: Primary | ICD-10-CM

## 2019-02-15 ENCOUNTER — PATIENT MESSAGE (OUTPATIENT)
Dept: ENDOCRINOLOGY | Facility: CLINIC | Age: 69
End: 2019-02-15

## 2019-02-15 ENCOUNTER — CLINICAL SUPPORT (OUTPATIENT)
Dept: INTERNAL MEDICINE | Facility: CLINIC | Age: 69
End: 2019-02-15
Payer: MEDICARE

## 2019-02-15 DIAGNOSIS — J30.2 SEASONAL ALLERGIC RHINITIS, UNSPECIFIED TRIGGER: ICD-10-CM

## 2019-02-15 PROCEDURE — 99499 UNLISTED E&M SERVICE: CPT | Mod: HCNC,S$GLB,, | Performed by: ALLERGY & IMMUNOLOGY

## 2019-02-15 PROCEDURE — 99499 NO LOS: ICD-10-PCS | Mod: HCNC,S$GLB,, | Performed by: ALLERGY & IMMUNOLOGY

## 2019-02-15 PROCEDURE — 95115 IMMUNOTHERAPY ONE INJECTION: CPT | Mod: HCNC,S$GLB,, | Performed by: FAMILY MEDICINE

## 2019-02-15 PROCEDURE — 95115 PR IMMUNOTHERAPY, ONE INJECTION: ICD-10-PCS | Mod: HCNC,S$GLB,, | Performed by: FAMILY MEDICINE

## 2019-02-17 DIAGNOSIS — I15.2 HYPERTENSION ASSOCIATED WITH DIABETES: ICD-10-CM

## 2019-02-17 DIAGNOSIS — E11.59 HYPERTENSION ASSOCIATED WITH DIABETES: ICD-10-CM

## 2019-02-18 ENCOUNTER — CLINICAL SUPPORT (OUTPATIENT)
Dept: INTERNAL MEDICINE | Facility: CLINIC | Age: 69
End: 2019-02-18
Payer: MEDICARE

## 2019-02-18 DIAGNOSIS — J30.9 CHRONIC ALLERGIC RHINITIS: ICD-10-CM

## 2019-02-18 PROCEDURE — 95115 IMMUNOTHERAPY ONE INJECTION: CPT | Mod: HCNC,S$GLB,, | Performed by: FAMILY MEDICINE

## 2019-02-18 PROCEDURE — 95115 PR IMMUNOTHERAPY, ONE INJECTION: ICD-10-PCS | Mod: HCNC,S$GLB,, | Performed by: FAMILY MEDICINE

## 2019-02-18 PROCEDURE — 99499 UNLISTED E&M SERVICE: CPT | Mod: HCNC,S$GLB,, | Performed by: ALLERGY & IMMUNOLOGY

## 2019-02-18 PROCEDURE — 99499 NO LOS: ICD-10-PCS | Mod: HCNC,S$GLB,, | Performed by: ALLERGY & IMMUNOLOGY

## 2019-02-18 RX ORDER — VALSARTAN 160 MG/1
TABLET ORAL
Qty: 180 TABLET | Refills: 0 | Status: SHIPPED | OUTPATIENT
Start: 2019-02-18 | End: 2019-05-11 | Stop reason: SDUPTHER

## 2019-02-20 ENCOUNTER — CLINICAL SUPPORT (OUTPATIENT)
Dept: LAB | Facility: HOSPITAL | Age: 69
End: 2019-02-20
Attending: ORTHOPAEDIC SURGERY
Payer: MEDICARE

## 2019-02-20 ENCOUNTER — ANESTHESIA EVENT (OUTPATIENT)
Dept: SURGERY | Facility: HOSPITAL | Age: 69
End: 2019-02-20
Payer: MEDICARE

## 2019-02-20 ENCOUNTER — HOSPITAL ENCOUNTER (OUTPATIENT)
Dept: PREADMISSION TESTING | Facility: HOSPITAL | Age: 69
Discharge: HOME | End: 2019-02-20
Attending: ORTHOPAEDIC SURGERY
Payer: MEDICARE

## 2019-02-20 ENCOUNTER — PATIENT MESSAGE (OUTPATIENT)
Dept: UROLOGY | Facility: CLINIC | Age: 69
End: 2019-02-20

## 2019-02-20 VITALS
HEIGHT: 63 IN | HEART RATE: 83 BPM | OXYGEN SATURATION: 97 % | DIASTOLIC BLOOD PRESSURE: 71 MMHG | RESPIRATION RATE: 18 BRPM | TEMPERATURE: 98 F | SYSTOLIC BLOOD PRESSURE: 157 MMHG | BODY MASS INDEX: 41.62 KG/M2 | WEIGHT: 234.88 LBS

## 2019-02-20 DIAGNOSIS — Z01.818 PREOP EXAMINATION: ICD-10-CM

## 2019-02-20 DIAGNOSIS — N39.0 RECURRENT UTI: Primary | ICD-10-CM

## 2019-02-20 DIAGNOSIS — Z01.818 PREOP EXAMINATION: Primary | ICD-10-CM

## 2019-02-20 PROCEDURE — 93005 ELECTROCARDIOGRAM TRACING: CPT | Mod: HCNC

## 2019-02-20 RX ORDER — SODIUM CHLORIDE, SODIUM LACTATE, POTASSIUM CHLORIDE, CALCIUM CHLORIDE 600; 310; 30; 20 MG/100ML; MG/100ML; MG/100ML; MG/100ML
INJECTION, SOLUTION INTRAVENOUS CONTINUOUS
Status: CANCELLED | OUTPATIENT
Start: 2019-02-20

## 2019-02-20 RX ORDER — LIDOCAINE HYDROCHLORIDE 10 MG/ML
1 INJECTION, SOLUTION EPIDURAL; INFILTRATION; INTRACAUDAL; PERINEURAL ONCE
Status: CANCELLED | OUTPATIENT
Start: 2019-02-20 | End: 2019-02-20

## 2019-02-20 NOTE — ANESTHESIA PREPROCEDURE EVALUATION
"                                                                                                             02/20/2019  Vivienne Jose is a 68 y.o., female is scheduled for right TKA under spinal/MAC/gen on 3/6/19.     "Patient is moderate risk for intermediate risk surgery to have right knee surgery. No contraindication such as recent MI, CHF, severe valve obstruction or tachyarrhythmias.  METS<4. She does have DM, No CVA or CRF. Risk of cardiac event<0.9%"      Past Medical History:   Diagnosis Date    Allergy     Angio-edema     Arthritis     Cataract     Cerebrovascular malformation     Colon polyps     Coronary artery disease     Diabetes mellitus, type 2     Diabetic peripheral neuropathy     GERD (gastroesophageal reflux disease)     Herpes infection     Hyperlipidemia     Hypertension     Hypothyroidism     Kidney stones     Nausea & vomiting 11/23/2015    Obesity, morbid     Ovarian cyst     Pyelonephritis, chronic     Seizure disorder, focal motor     Sleep apnea     Type II or unspecified type diabetes mellitus with neurological manifestations, uncontrolled(250.62)     Urinary tract infection     Urticaria     Vaginal infection      Past Surgical History:   Procedure Laterality Date    CARPAL TUNNEL RELEASE Right 2014    COLONOSCOPY      COLONOSCOPY N/A 8/29/2014    [facility]    COLONOSCOPY Golytely N/A 9/13/2017    [facility]    CYST REMOVAL      skin; multiples    CYSTOSCOPY WITH STENT PLACEMENT Left 12/28/2016    [facility] OR    ENDOSCOPIC-RELEASE-CARPAL TUNNEL right Right 7/8/2014    [facility]ST [facility]    EXTRACORPOREAL SHOCK WAVE LITHOTRIPSY  2002    EXTRACTION-STONE-URETEROSCOPY Left 12/28/2016    [facility] OR    HYSTERECTOMY  1984    KIDNEY STONE SURGERY      LITHOTRIPSY-LASER Left " 12/28/2016    [facility] at [facility] OR    RELEASE-FINGER-TRIGGER right long finger Right 7/8/2014    Per[facility] by [facility] at [facility]ST [facility]    THYROIDECTOMY  1977         TONSILLECTOMY, ADENOIDECTOMY      TRIGGER FINGER RELEASE      TUBAL LIGATION         Anesthesia Evaluation    I have reviewed the Patient Summary Reports.    I have reviewed the Nursing Notes.   I have reviewed the Medications.     Review of Systems  Anesthesia Hx:  Hx of Anesthetic complications  Personal Hx of Anesthesia complications, Post-Operative Nausea/Vomiting, in the past, but not with recent anesthetics / prophylaxis Slow To Awaken/Delayed Emergence and mild, somewhat sensitive to sedation / narcotics   Social:  Non-Smoker, No Alcohol Use    Hematology/Oncology:  Hematology Normal        EENT/Dental:   chronic allergic rhinitis   Cardiovascular:   Exercise tolerance: poor Hypertension CAD    Denies Angina. hyperlipidemia BRIONES (Normal stress test, per cardiology most likely r/t weight) ECG has been reviewed.  Functional Capacity low / < 4 METS, able to walk about a 1/2 block    Pulmonary:   Sleep Apnea, CPAP    Renal/:   renal calculi    Hepatic/GI:   GERD, well controlled Denies Liver Disease.    Neurological:  Seizure Disorder (Motor focal seizures, stress induced, last seizure 1973)    Endocrine:   Hypothyroidism  Diabetes, Type 2 Diabetes , Complications include Diabetic Neuropathy , controlled by insulin, non-insulin injectables, diet. , most recent HgA1c value was 6.9 on 2/2019.        Physical Exam  General:  Morbid Obesity    Airway/Jaw/Neck:  Airway Findings: Mouth Opening: Small, but > 3cm Tongue: Normal  General Airway Assessment: Adult  Mallampati: III      Dental:  Dental Findings: Periodontal disease, Severe   Chest/Lungs:  Chest/Lungs Findings: Clear to auscultation, Normal Respiratory Rate     Heart/Vascular:  Heart Findings: Rate: Normal  Rhythm: Regular Rhythm  Sounds: Normal   Heart murmur: negative          Stress Test 11/1/17:  CONCLUSIONS: NORMAL MYOCARDIAL PERFUSION PET STRESS TEST  1. The perfusion scan is free of evidence for myocardial ischemia or injury.   2. Resting wall motion is physiologic. Stress wall motion is physiologic.   3. LV function is normal at rest and stress.  (normal is >= 51%)  4. The ventricular volumes are normal at rest and stress.   5. The extracardiac distribution of radioactivity is normal.   6. There was no previous study available to compare.    EKG 2/20/19:  Normal sinus rhythm  Right bundle branch block  Left anterior fascicular block  Bifascicular block   Abnormal ECG  When compared with ECG of 01-NOV-2017 10:10, No significant change was found      Anesthesia Plan  Type of Anesthesia, risks & benefits discussed:  Anesthesia Type:  spinal, MAC, general  Patient's Preference:   Intra-op Monitoring Plan:   Intra-op Monitoring Plan Comments:   Post Op Pain Control Plan:   Post Op Pain Control Plan Comments:   Induction:   IV  Beta Blocker:         Informed Consent: Patient understands risks and agrees with Anesthesia plan.  Questions answered.   ASA Score: 3     Day of Surgery Review of History & Physical:        Anesthesia Plan Notes: Anesthesia consent to be signed prior to procedure on 3/6/19  Cardiac risk stratification in Epic.        Ready For Surgery From Anesthesia Perspective.

## 2019-02-20 NOTE — PRE-PROCEDURE INSTRUCTIONS
Being admitted    Allergies, medical, surgical, family and psychosocial histories reviewed with patient. Periop plan of care reviewed. Preop instructions given, including medications to take and to hold. Hibiclens soap and instructions on use given. Time allotted for questions to be addressed.  Patient verbalized understanding.

## 2019-02-20 NOTE — DISCHARGE INSTRUCTIONS
Your surgery is scheduled for 3/6    Please report to Outpatient Surgery Intake Office on the 2nd FLOOR at 0530a.m.     We will call you 3/4, after 2pm, to verify your time of arrival.     INSTRUCTIONS IMPORTANT!!!  ¨ Do not eat or drink after 12 midnight-including water. OK to brush teeth, no   gum, candy or mints!    ¨ Take only these medicines with a small swallow of water-morning of surgery.  Hygroton, Valsartan, Synthroid        ____  Proceed to [facility] on *** for additional blood test.        ____  Do not wear makeup, including mascara.  ____  No powder, lotions or creams to surgical area.  ____  Please remove all jewelry, including piercings and leave at home.  ____  No money or valuables needed. Please leave at home.  ____  Please bring any documents given by your doctor.  ____  If going home the same day, arrange for a ride home. You will not be able to             drive if Anesthesia was used.  ____  Children under 18 years require a parent / guardian present the entire time             they are in surgery / recovery.  ____  Wear loose fitting clothing. Allow for dressings, bandages.  ____  Stop Aspirin, Ibuprofen, Motrin and Aleve at least 3-5 days before surgery, unless otherwise instructed by your doctor, or the nurse.   You MAY use Tylenol/acetaminophen until day of surgery.  ____  Wash the surgical area with Hibiclens the night before surgery, and again the             morning of surgery.  Be sure to rinse hibiclens off completely (if instructed by   nurse).  ____  If you take diabetic medication, do not take am of surgery unless instructed by Doctor.  ____  Call MD for temperature above 101 degrees or any other signs of infection such as Urinary (bladder) infection, Upper respiratory infection, skin boils, etc.  ____ Stop taking any Fish Oil supplement or any Vitamins that contain Vitamin E at least 5 days prior to surgery.  ____ Do Not wear your contact lenses the day of your  procedure.  You may wear your glasses.      ____Do not shave surgical site for 3 days prior to surgery.  ____ Practice Good hand washing before, during, and after procedure.      I have read or had read and explained to me, and understand the above information.  Additional comments or instructions:  For additional questions call [phone]      ANESTHESIA SIDE EFFECTS  -For the first 24 hours after surgery:  Do not drive, use heavy equipment, make important decisions, or drink alcohol  -It is normal to feel sleepy for several hours.  Rest until you are more awake.  -Have someone stay with you, if needed.  They can watch for problems and help keep you safe.  -Some possible post anesthesia side effects include: nausea and vomiting, sore throat and hoarseness, sleepiness, and dizziness.        Pre-Op Bathing Instructions    Before surgery, you can play an important role in your own health.    Because skin is not sterile, we need to be sure that your skin is as free of germs as possible. By following the instructions below, you can reduce the number of germs on your skin before surgery.    IMPORTANT: You will need to shower with a special soap called Hibiclens*, available at any pharmacy.  If you are allergic to Chlorhexidine (the antiseptic in Hibiclens), use an antibacterial soap such as Dial Soap for your preoperative shower.  You will shower with Hibiclens both the night before your surgery and the morning of your surgery.  Do not use Hibiclens on the head, face or genitals to avoid injury to those areas.    STEP #1: THE NIGHT BEFORE YOUR SURGERY     1. Do not shave the area of your body where your surgery will be performed.  2. Shower and wash your hair and body as usual with your normal soap and shampoo.  3. Rinse your hair and body thoroughly after you shower to remove all soap residue.  4. With your hand, apply one packet of Hibiclens soap to the surgical site.   5. Wash the site gently for five (5) minutes. Do  not scrub your skin too hard.   6. Do not wash with your regular soap after Hibiclens is used.  7. Rinse your body thoroughly.  8. Pat yourself dry with a clean, soft towel.  9. Do not use lotion, cream, or powder.  10. Wear clean clothes.    STEP #2: THE MORNING OF YOUR SURGERY     1. Repeat Step #1.    * Not to be used by people allergic to Chlorhexidine.        Total Knee Replacement  During total knee replacement surgery, your damaged knee joint is replaced with an artificial joint, called a prosthesis. This surgery almost always reduces joint pain and improves your quality of life.     The parts of the prosthesis are secured to the bones of the knee. Together they form the new joint.   Before your surgery  You will most likely arrive at the hospital on the morning of the surgery. Be sure to follow all of your doctors instructions on preparing for surgery:  · Follow any directions you are given for taking medicines or for not eating or drinking before surgery.  · At the hospital, your temperature, pulse, breathing, and blood pressure will be checked.  · An IV (intravenous) line will be started to provide fluids and medicines needed during surgery.  The surgical procedure  When the surgical team is ready, youll be taken to the operating room. There youll be given anesthesia to help you sleep through surgery, or to make you numb from the waist down. Then an incision is made on the front or side of your knee. Any damaged bone is cleaned away, and the new joint components are put into place. The incision is closed with surgical staples or stitches.  After your surgery  After surgery, youll be sent to the recovery room. When you are fully awake, youll be moved to your room. The nurses will give you medicines to ease your pain. You may have a catheter (small tube) in your bladder. A continuous passive motion machine may be used on your knee to keep it from getting stiff. A sequential compression machine may be  used to prevent blood clots by gently squeezing then releasing your leg. You may be given medicine to prevent blood clots. Soon, healthcare providers will help you get up and moving.  When to call your doctor  Once at home, call your doctor if you have any of the symptoms below:  · An increase in knee pain  · Pain or swelling in the calf or leg  · Unusual redness, heat, or drainage at the incision site  · Fever of 100.4°F (38°C) or higher  · Shaking chills  · Trouble breathing or chest pain (call 911)   Date Last Reviewed: 9/20/2015  © 2379-3304 [employer]. [address] [place] [address], [place] [address]. All rights reserved. This information is not intended as a substitute for professional medical care. Always follow your healthcare professional's instructions.      Types of Anesthesia  Your anesthesiologist is a key member of your surgical team. He or she gives you anesthetics (medications to keep you comfortable and decrease your awareness of surgery) and monitors your condition to keep you safe during surgery. You will have 1 of 3 kinds of anesthesia during your surgery.  Monitored anesthesia care (MAC)  · Often used for surgery that is short or not too invasive.  · Sedatives (medicines to relax you) are given through an IV (intravenous) line.  · The area around the surgical site is usually numbed with a local anesthetic.  · You may choose to remain awake or sleep lightly.  Regional anesthesia (sometimes called spinal epidural or mary block)  · Often used for surgery on the arms, legs, and abdomen. It is also used during childbirth.  · A specific region of your body is numbed by injecting anesthetic near nerves, near your spine, or near the operative site.  · You may also be given sedatives through an IV line to relax you.  · With regional anesthesia, you may choose to remain awake or sleep lightly.  General anesthesia  · Often used for extensive surgery, such as on the heart, brain, or abdominal  operation.  · Also used when the patient wants to be totally asleep.  · May be given as a gas that you breathe and as medicines that are injected through an IV line.  · Because you are asleep, you feel no pain and remember nothing of the surgery.  The risks and complications of anesthesia depend on your overall health. If you are healthy, the risks are low. The risks are higher for patients with heart or lung problems. Your anesthesiologist or nurse anesthetist will discuss the risks with you.   Date Last Reviewed: 12/1/2016  © 0586-9863 [employer]. [address] [place] [address]. All rights reserved. This information is not intended as a substitute for professional medical care. Always follow your healthcare professional's instructions.

## 2019-02-21 ENCOUNTER — PATIENT OUTREACH (OUTPATIENT)
Dept: OTHER | Facility: OTHER | Age: 69
End: 2019-02-21

## 2019-02-21 RX ORDER — NITROFURANTOIN 25; 75 MG/1; MG/1
100 CAPSULE ORAL NIGHTLY
Qty: 14 CAPSULE | Refills: 0 | Status: SHIPPED | OUTPATIENT
Start: 2019-02-21 | End: 2019-04-23

## 2019-02-21 NOTE — PROGRESS NOTES
Last 5 Patient Entered Readings                                      Current 30 Day Average: 129/66     Recent Readings 2/20/2019 2/20/2019 2/19/2019 2/19/2019 2/18/2019    SBP (mmHg) 127 127 135 135 133    DBP (mmHg) 66 66 60 60 79    Pulse 69 69 67 67 71          Last 6 Patient Entered Readings                                          Most Recent A1c: 6.9% on 2/8/2019  (Goal: 7%)     Recent Readings 2/21/2019 2/20/2019 2/20/2019 2/19/2019 2/19/2019    Blood Glucose (mg/dL) 142 165 128 168 159        Digital [facility]: Health  Follow Up    Lifestyle Modifications:    1.Dietary Modifications (Sodium intake <2,000mg/day, food labels, dining out): Patient denies changes to her diet.     2.Physical Activity: Mrs. Jose has not gone back to the gym to resume regular exercise. She is scheduled for knee replacement surgery on March 6, and will attend physical therapy afterwards. Her plan is to get back to the gym after completing physical therapy.     3.Medication Therapy: Patient has been compliant with the medication regimen.    4.Patient has the following medication side effects/concerns: None  (Frequency/Alleviating factors/Precipitating factors, etc.)     Follow up with Mrs. Vivienne Jose completed. Mrs. Jose is doing okay, and is happy to see improved BP and BG readings. However, she acknowledges the occasional spikes. She has no questions for improving dietary habits or physical activity currently. Also, no additional questions or concerns. Will continue to follow up to achieve health goals.

## 2019-02-27 ENCOUNTER — OFFICE VISIT (OUTPATIENT)
Dept: SLEEP MEDICINE | Facility: CLINIC | Age: 69
End: 2019-02-27
Payer: MEDICARE

## 2019-02-27 VITALS
SYSTOLIC BLOOD PRESSURE: 123 MMHG | BODY MASS INDEX: 41.97 KG/M2 | HEIGHT: 63 IN | WEIGHT: 236.88 LBS | DIASTOLIC BLOOD PRESSURE: 64 MMHG | HEART RATE: 66 BPM

## 2019-02-27 DIAGNOSIS — G47.33 OSA (OBSTRUCTIVE SLEEP APNEA): Primary | ICD-10-CM

## 2019-02-27 PROCEDURE — 99214 OFFICE O/P EST MOD 30 MIN: CPT | Mod: HCNC,S$GLB,, | Performed by: PSYCHIATRY & NEUROLOGY

## 2019-02-27 PROCEDURE — 3078F PR MOST RECENT DIASTOLIC BLOOD PRESSURE < 80 MM HG: ICD-10-PCS | Mod: HCNC,CPTII,S$GLB, | Performed by: PSYCHIATRY & NEUROLOGY

## 2019-02-27 PROCEDURE — 99214 PR OFFICE/OUTPT VISIT, EST, LEVL IV, 30-39 MIN: ICD-10-PCS | Mod: HCNC,S$GLB,, | Performed by: PSYCHIATRY & NEUROLOGY

## 2019-02-27 PROCEDURE — 1101F PT FALLS ASSESS-DOCD LE1/YR: CPT | Mod: HCNC,CPTII,S$GLB, | Performed by: PSYCHIATRY & NEUROLOGY

## 2019-02-27 PROCEDURE — 99999 PR PBB SHADOW E&M-EST. PATIENT-LVL III: ICD-10-PCS | Mod: PBBFAC,HCNC,, | Performed by: PSYCHIATRY & NEUROLOGY

## 2019-02-27 PROCEDURE — 3074F PR MOST RECENT SYSTOLIC BLOOD PRESSURE < 130 MM HG: ICD-10-PCS | Mod: HCNC,CPTII,S$GLB, | Performed by: PSYCHIATRY & NEUROLOGY

## 2019-02-27 PROCEDURE — 1101F PR PT FALLS ASSESS DOC 0-1 FALLS W/OUT INJ PAST YR: ICD-10-PCS | Mod: HCNC,CPTII,S$GLB, | Performed by: PSYCHIATRY & NEUROLOGY

## 2019-02-27 PROCEDURE — 3078F DIAST BP <80 MM HG: CPT | Mod: HCNC,CPTII,S$GLB, | Performed by: PSYCHIATRY & NEUROLOGY

## 2019-02-27 PROCEDURE — 3074F SYST BP LT 130 MM HG: CPT | Mod: HCNC,CPTII,S$GLB, | Performed by: PSYCHIATRY & NEUROLOGY

## 2019-02-27 PROCEDURE — 99999 PR PBB SHADOW E&M-EST. PATIENT-LVL III: CPT | Mod: PBBFAC,HCNC,, | Performed by: PSYCHIATRY & NEUROLOGY

## 2019-02-27 NOTE — PATIENT INSTRUCTIONS
"[employer] [facility] - online - chin strap for CPAP     ASSESSMENT:    1.Previously diagnosed severe  BAM (obstructive sleep apnea). The patient symptomatically has  excessive daytime sleepiness, snoring,  witnessed breathing pauses, excessive daytime fatigue, gasping for air in sleep and interrupted sleep  with exam findings of "a crowded oral airway and elevated body mass index. The patient has medical co-morbidities of CAD, diabetes, heart disease and hypertension,  which can be worsened by BAM. This warrants treatment. Doing well with CPAP 14.5 with NL AHi pm CPAP download, however significant residual sleepiness. SaO2 was in the 90's on current settings as per titration 2018.     2. Apparently lifetime h/o sleepiness - need to r/o a central hypersomnolence disorder.      PLAN:      Increase  14.5 cm H2O to 15 cm.  If no improvement on EDS - will consider BPAP titration.  May also consider MSLT given long term history of sleepiness if we are able to decrease Gabapentin dose by then.  Will order heated hose so she can increase humidity.      "

## 2019-02-27 NOTE — PROGRESS NOTES
Vivienne Jose  was seen at the request of  No ref. provider found for sleep evaluation.    08/28/2018 INITIAL HISTORY OF PRESENT ILLNESS:  Vivienne Jose is a 68 y.o. female is here to be evaluated for a sleep disorder.       CHIEF COMPLAINT:      The patient's complaints include excessive daytime sleepiness, excessive daytime fatigue, snoring,  witnessed breathing pauses,  gasping for air in sleep and interrupted sleep since  2004, when she was diagnosed with severe BAM.    She has been well controlled for about 10 years.    She was previously seeing Dr. Wilkins.    HEr machine is 10 years old and needs replacement.    She noticed feeling more tired and waking up more frequently over the last year.    She reports occasional break through snoring.    She believes she has gained 50 lbs since titration.    She replaces her Durand mask regularly with a chin strap.    Reports  dry mouth and sore throat  Reports nasal congestion - Claritin and now Flonase and Asteline  Reports  morning headaches  Reports  interrupted sleep  Denies frequent leg movements  Denies symptoms concerning for parasomnia    The ESS (Dante Sleepiness Score) taken on initial visit is 15 /24    The patient had tonsillectomy in the past     06/11/2014: She comes with her new Resmed machine  today. Set at 12 cm H2O. No issue with mouthleak, dry mouth, break through snoring. Recent titration showed 12-13 cm H2O to be effective. Unfortunately she is still feeling sleepy - ESS 18/24. No PLMS found on sleep study.    CPAP pressure: 12 cm H2O  Mask comfort / fit:  Durand - fits fine    Pressure tolerance: fine; not using ramp   Humidification: fine   CPAP Interrogation:    Ave daily usage:7.8 hours /7days  Ave daily usage:7.8 hours /30days  Days >4 hours usage: 7 /7 days  Days >4 hours usage: 30/30 days   Machine condition: good     Frequent arousals were noted even when her respiratory events have been controlled.Her bedroom is dark and she denies  physical discomfort and polyuria. No PLMS reported on the sleep study. Denied leg discomfort.    08/28/2018: AStill sleepy with inactivity. Significant early afternoon drowsiness.   CPAP 12 cm (211 on manometry). Study 2014 recommended 12-13 cm.  At times needs to rearranged her Durand mask at night.  Using chin strap. Did not like Dreamwear.No break through snoring.  Gained 2 lbs.    AHi 0.3  9 hrs per day  3 L/min leak  100% Compliance.    EPWORTH SLEEPINESS SCALE 8/23/2018   Sitting and reading 3   Watching TV 2   Sitting, inactive in a public place (e.g. a theatre or a meeting) 1   As a passenger in a car for an hour without a break 2   Lying down to rest in the afternoon when circumstances permit 3   Sitting and talking to someone 0   Sitting quietly after a lunch without alcohol 0   In a car, while stopped for a few minutes in traffic 0   Total score 11     12/10/2018:    CPAP 13 cm on Airsence straight CPAP. Feeling better at 13.5, but still residual sleepiness.  Concerned about Gabapentin effect. Recent titration showed CPAP 12 cm as effective in terms of AHI.  Dide not tolerate FFM - very uncomfortable; back to Durand for hear  Keeps humidity 2/8 because of condensation (bedroom very cold).  No break through snoring.  The patient reports improved sleep continuity and daytime sleepiness on PAP. ESS today is 14/24.  Denies break through snoring. ++++ dry mouth.     Recent titration was discussed.    EPWORTH SLEEPINESS SCALE 12/8/2018   Sitting and reading 3   Watching TV 2   Sitting, inactive in a public place (e.g. a theatre or a meeting) 1   As a passenger in a car for an hour without a break 3   Lying down to rest in the afternoon when circumstances permit 3   Sitting and talking to someone 0   Sitting quietly after a lunch without alcohol 2   In a car, while stopped for a few minutes in traffic 0   Total score 14         02/27/2019:  Vivienne Jose still reports interrupted sleep and significant daytime  sleepiness. On Gabapentin 600 mg BID.  Failed Dreamwear and Dreamwear full - comfortable but bad seal. Prefers Durand LT Medium cushion.  Decided to stary with Druand LT Medium.   Decided not to try Nuvigil/Provigil.  Doing well with pressure increase 14.5-18 cm H2O.     30/30 hrs  6-9 hrs per night  AHI 0.3    Reports always being sleepy since college years. She has always fallen asleep while reading. It her life it has been hard to stay awake through the mess.  Denied dreams while napping.   Very hard to stay asleep while working.   + occasional sleep paralysis.  Awaiting orthopaedic surgery.       SLEEP ROUTINE 02/27/2019 :    Bed partner:   Time to bed: 10 PM  Sleep onset latency: 5 min  Disruptions or awakenings: 4-5  Time to fall back into sleep: 1 min  Wakeup time: 5 AM   Perceived sleep quality: 2/5  Perceived total sleep time:  7  hours.  Daytime naps: 0  Weekend sleep routine: 7 AM  Exercise routine: N/A     PREVIOUS SLEEP STUDIES:     PSG/ SPLIT night study  In 2004 showed significant BAM with the AHI of 33.1/hour and SaO2 minimum of 88 %. Effective control of respiratory events was achieved at   10 cm H2O. Sleep efficiency was 79 %.    CPAP titration on 4/23/14: Adequate control of respiratory events was achieved at 12-13 cm of H2O. Weight 238 lbs. Frequent arousals were noted even when her respiratory events have been controlled.  CPAP titration on 9/19/18: Effective control of respiratory events was achieved at 12 cm of H2O, however SaO2 was high 80s-low 90s. Weight 241 lbs.        DME: [facility]      PAST MEDICAL HISTORY:    Active Ambulatory Problems     Diagnosis Date Noted    Trigger middle finger of right hand 07/30/2012    Kidney stones 08/24/2012    HTN (hypertension) 08/24/2012    Post-surgical hypothyroidism 11/23/2012    CAD (coronary artery disease) 11/23/2012    Chronic allergic rhinitis 11/30/2012    Nuclear sclerosis - Both Eyes 04/02/2013    Carpal tunnel syndrome  of right wrist 07/08/2014    Pain in limb 07/24/2014    Stiffness of joint 07/24/2014    Muscle weakness 07/24/2014    Proteinuria 02/03/2015    Dyslipidemia associated with type 2 diabetes mellitus 04/28/2015    BAM (obstructive sleep apnea) 04/28/2015    Sacroiliitis 06/09/2015    Lumbar facet arthropathy 06/09/2015    Physical deconditioning 06/09/2015    Osteopenia 09/22/2015    Allergic rhinitis 09/22/2015    Hypertension associated with diabetes 09/22/2015    Hypothyroidism due to acquired atrophy of thyroid 09/22/2015    Vitamin D deficiency 09/22/2015    Sleep apnea 09/22/2015    Hypocalcemia 02/29/2016    Morbid obesity with body mass index (BMI) of 40.0 to 44.9 in adult 02/29/2016    Recurrent HSV (herpes simplex virus) 03/21/2016    Chronic pain of right knee 05/02/2016    Gastroesophageal reflux disease 07/22/2016    Hyperlipidemia 07/22/2016    Nephrolithiasis 12/14/2016    Recurrent UTI 03/02/2017    Hypokalemia 04/24/2017    Diabetic polyneuropathy associated with type 2 diabetes mellitus 04/24/2017    Diverticulosis of intestine without bleeding 08/02/2017    Skin nodule 08/02/2017    Facet arthropathy, cervical 07/25/2011    Bilateral carotid artery disease 07/26/2011    Type 2 diabetes mellitus with stage 3 chronic kidney disease, with long-term current use of insulin 02/23/2018    Chronic kidney disease, stage III (moderate) 02/23/2018    Fatigue 06/21/2018    Gait instability 07/16/2018    Interstitial cystitis 09/21/2018    Chronic left-sided low back pain with left-sided sciatica 10/22/2018    Hypothyroidism 11/16/2018    Needs flu shot 11/16/2018    Hematuria 11/16/2018     Resolved Ambulatory Problems     Diagnosis Date Noted    Allergic conjunctivitis 11/23/2012    Screen for colon cancer 08/29/2014    UTI symptoms 11/05/2014    URI (upper respiratory infection) 01/07/2015    LBP radiating to left leg 06/12/2015    Preventative health care  09/22/2015    Type 2 diabetes mellitus, uncontrolled 09/22/2015    Body mass index 45.0-49.9, adult 09/22/2015    Nausea & vomiting 11/23/2015    Dysuria 02/29/2016    Soft tissue mass 08/04/2017    Low vitamin D level 03/14/2018     Past Medical History:   Diagnosis Date    Allergy     Angio-edema     Arthritis     Cataract     Cerebrovascular malformation     Colon polyps     Coronary artery disease     Diabetes mellitus, type 2     Diabetic peripheral neuropathy     GERD (gastroesophageal reflux disease)     Herpes infection     Hyperlipidemia     Hypertension     Hypothyroidism     Kidney stones     Nausea & vomiting 11/23/2015    Obesity, morbid     Ovarian cyst     Pyelonephritis, chronic     Seizure disorder, focal motor     Sleep apnea     Type II or unspecified type diabetes mellitus with neurological manifestations, uncontrolled(250.62)     Urinary tract infection     Urticaria     Vaginal infection                 PAST SURGICAL HISTORY:    Past Surgical History:   Procedure Laterality Date    CARPAL TUNNEL RELEASE Right 2014    COLONOSCOPY      COLONOSCOPY N/A 8/29/2014    [facility]    COLONOSCOPY Golytely N/A 9/13/2017    Performed [facility] at [facility]    CYST REMOVAL      skin; multiples    CYSTOSCOPY WITH STENT PLACEMENT Left 12/28/2016    Performed [facility] Greg Lyons [facility] at [facility]    ENDOSCOPIC-RELEASE-CARPAL TUNNEL right Right 7/8/2014    Performed [facility]    EXTRACORPOREAL SHOCK WAVE LITHOTRIPSY  2002    EXTRACTION-STONE-URETEROSCOPY Left 12/28/2016    [facility] at [facility]    HYSTERECTOMY  1984    KIDNEY STONE SURGERY      LITHOTRIPSY-LASER Left 12/28/2016    [facility] at [facility]    RELEASE-FINGER-TRIGGER right long finger Right 7/8/2014    Per[facility] at [facility]     THYROIDECTOMY  1977         TONSILLECTOMY, ADENOIDECTOMY      TRIGGER FINGER RELEASE      TUBAL LIGATION           FAMILY HISTORY:                Family History   Problem Relation Age of Onset    Cancer Father     Arthritis Father     Gout Father     Allergies Son     Allergic rhinitis Son     Skin cancer Mother     Macular degeneration Mother     Dementia Mother     Hypertension Mother     No Known Problems Daughter     Diabetes Daughter     No Known Problems Daughter     Glaucoma Maternal Aunt         Great Maternal Aunt    Leukemia Maternal Uncle     Amblyopia Neg Hx     Cataracts Neg Hx     Retinal detachment Neg Hx     Strabismus Neg Hx     Stroke Neg Hx     Thyroid disease Neg Hx     Kidney disease Neg Hx     Angioedema Neg Hx     Asthma Neg Hx     Atopy Neg Hx     Eczema Neg Hx     Immunodeficiency Neg Hx     Rhinitis Neg Hx     Urticaria Neg Hx        SOCIAL HISTORY:          Tobacco:   Social History     Tobacco Use   Smoking Status Never Smoker   Smokeless Tobacco Never Used       alcohol use:    Social History     Substance and Sexual Activity   Alcohol Use No    Alcohol/week: 0.0 oz                 Occupation:     ALLERGIES:    Review of patient's allergies indicates:   Allergen Reactions    Sulfa (sulfonamide antibiotics) Nausea Only    Ciprofloxacin     Januvia [sitagliptin]      abd pain    Lisinopril     Lotensin [benazepril]     Nexium [esomeprazole magnesium] Other (See Comments)     Gas       CURRENT MEDICATIONS:    Current Outpatient Medications   Medication Sig Dispense Refill    amLODIPine (NORVASC) 5 MG tablet Take 1 tablet (5 mg total) by mouth once daily. 90 tablet 3    azelastine (ASTELIN) 137 mcg (0.1 %) nasal spray 2 sprays (274 mcg total) by Nasal route 2 (two) times daily. 90 mL 4    biotin 10,000 mcg Cap Take by mouth.      blood sugar diagnostic (TRUE METRIX GLUCOSE TEST STRIP) Strp TEST TWO TIMES DAILY 200 strip 6    blood-glucose meter Misc  Hum[employer] True Metrix Air meter 1 each 0    calcium carbonate-vitamin D3 600 mg(1,500mg) -100 unit Cap Take 1 tablet by mouth once daily.      chlorthalidone (HYGROTEN) 25 MG Tab Take 1 tablet (25 mg total) by mouth once daily. 90 tablet 1    diclofenac sodium (VOLTAREN) 1 % Gel Apply 2 g topically 4 (four) times daily. 3 Tube 3    econazole nitrate 1 % cream Mix with hydrocortisone cream 1% 30 grams use bid prn 60 g 3    fluticasone (FLONASE) 50 mcg/actuation nasal spray 2 sprays (100 mcg total) by Each Nare route once daily. 48 g 4    gabapentin (NEURONTIN) 300 MG capsule Take 2 capsules (600 mg total) by mouth 2 (two) times daily. 180 capsule 1    insulin degludec (TRESIBA FLEXTOUCH U-100) 100 unit/mL (3 mL) InPn Inject 22 Units into the skin every evening. 15 Syringe 4    ketotifen (ZADITOR) 0.025 % (0.035 %) ophthalmic solution Place 1-2 drops into both eyes once daily.      L.acid-B.bifidum-B.animal-FOS (PROBIOTIC COMPLEX) 25 billion cell -100 mg Cap Take 1 capsule by mouth once daily.      lancets (TRUEPLUS LANCETS) 28 gauge Misc Inject 1 lancet into the skin 2 (two) times daily before meals. 200 each 6    levothyroxine (SYNTHROID) 75 MCG tablet Take 1 tablet (75 mcg total) by mouth once daily. 90 tablet 3    liraglutide 0.6 mg/0.1 mL, 18 mg/3 mL, subq PNIJ (VICTOZA 3-TAWANA) 0.6 mg/0.1 mL (18 mg/3 mL) PnIj INJECT 1.8 MG INTO THE SKIN ONCE DAILY. 27 mL 11    loratadine (CLARITIN) 10 mg tablet Take 10 mg by mouth once daily.      lovastatin (MEVACOR) 40 MG tablet TAKE 1 TABLET NIGHTLY (SUBSTITUTED FOR MEVACOR) 90 tablet 0    magnesium oxide-Mg AA chelate (MAGNESIUM, AMINO ACID CHELATE,) 133 mg Tab Take by mouth as directed.       metFORMIN (GLUCOPHAGE) 1000 MG tablet Take 1 tablet (1,000 mg total) by mouth 2 (two) times daily with meals. 180 tablet 3    nitrofurantoin, macrocrystal-monohydrate, (MACROBID) 100 MG capsule Take 1 capsule (100 mg total) by mouth nightly. 14 capsule 0    omeprazole  "(PRILOSEC) 20 MG capsule Take 1 capsule (20 mg total) by mouth daily as needed. 90 capsule 1    pen needle, diabetic (BD ULTRA-FINE EDUARDO PEN NEEDLE) 32 gauge x 5/32" Ndle USE WITH VICTOZA AND LEVEMIR (2 INJECTIONS EVERY DAY) 100 each 3    potassium chloride SA (K-DUR,KLOR-CON) 10 MEQ tablet Take 10 meq PO QD except on Mondays, Wednesdays and Fridays, on which she should take 2 tabs (20 meq)  tablet 1    PREMARIN vaginal cream Place 0.5 g vaginally 3 (three) times a week. 30 g 3    valACYclovir (VALTREX) 500 MG tablet TAKE 1 TABLET ONE TIME DAILY 90 tablet 0    valsartan (DIOVAN) 160 MG tablet TAKE 1 TABLET TWICE DAILY 180 tablet 0    aspirin (ECOTRIN) 81 MG EC tablet Take 81 mg by mouth once daily.      glucagon (human recombinant) inj 1mg/mL kit Inject 1 mL (1 mg total) into the muscle as needed. 1 kit 6     Current Facility-Administered Medications   Medication Dose Route Frequency Provider Last Rate Last Dose    Allergy Mix   Subcutaneous 1 time in Clinic/HOD Ailin Mills MD        Allergy Mix   Subcutaneous 1 time in Clinic/HOD Ailin Mills MD                          REVIEW OF SYSTEMS:   Sleep related symptoms as per HPI    reports weight gain - 50 lbs since last titration  Denies dyspnea  Denies palpitations  Denies acid reflux   Reports occasional polyuria  Reports  mood diturbance  Denies  anemia  Reports  muscle pain - joint stiffness due to arthritis    Otherwise, a balance of 10 systems reviewed is negative.    PHYSICAL EXAM:  /64   Pulse 66   Ht 5' 2.75" (1.594 m)   Wt 107.5 kg (236 lb 14.4 oz)   LMP  (LMP Unknown)   BMI 42.30 kg/m²   GENERAL: Overweight body habitus, well groomed.  HEENT:   HEENT:  Conjunctivae are non-erythematous; Pupils equal, round, and reactive to light; Nose is symmetrical; Nasal mucosa is pink and moist; Nasal airflow is diminished on the right; Posterior pharynx is pink; Modified Mallampati:IV; Posterior palate is low; Tonsils absent; " "Uvula is elongated;Tongue is broad; Dentition is fair; No TMJ tenderness; Jaw opening and protrusion without click and without discomfort.  NECK: Supple. Neck circumference is 17 inches. No thyromegaly. No palpable nodes.     SKIN: On face and neck: No abrasions, no rashes, no lesions.  No subcutaneous nodules are palpable.  RESPIRATORY: Chest is clear to auscultation.  Normal chest expansion and non-labored breathing at rest.  CARDIOVASCULAR: Normal S1, S2.  No murmurs, gallops or rubs. No carotid bruits bilaterally.  No edema. No clubbing. No cyanosis.    NEURO: Oriented to time, place and person. Normal attention span and concentration. Gait normal.    PSYCH: Affect is full. Mood is normal  MUSCULOSKELETAL: Moves 4 extremities. Gait normal.       ASSESSMENT:    1.Previously diagnosed severe  BAM (obstructive sleep apnea). The patient symptomatically has  excessive daytime sleepiness, snoring,  witnessed breathing pauses, excessive daytime fatigue, gasping for air in sleep and interrupted sleep  with exam findings of "a crowded oral airway and elevated body mass index. The patient has medical co-morbidities of CAD, diabetes, heart disease and hypertension,  which can be worsened by BAM. This warrants treatment. Doing well with CPAP 14.5 with NL AHi pm CPAP download, however significant residual sleepiness. SaO2 was in the 90's on current settings as per titration 2018.     2. Apparently lifetime h/o sleepiness - need to r/o a central hypersomnolence disorder.      PLAN:      Increase  14.5 cm H2O to 15 cm.  If no improvement on EDS - will consider BPAP titration.  May also consider MSLT given long term history of sleepiness if we are able to decrease Gabapentin dose by then.  Will order heated hose so she can increase humidity.          More than 25 minutes of this 45 minutes visit was spent in counseling: during our discussion today, we talked about the etiology of BAM as well as the potential ramifications of " untreated sleep apnea, which could include daytime sleepiness, hypertension, heart disease and/or stroke.  We discussed potential treatment options, which could include weight loss, body positioning, continuous positive airway pressure (CPAP), or referral for surgical consideration. Meanwhile, she  is urged to avoid supine sleep, weight gain and alcoholic beverages since all of these can worsen BAM.     Precautions: The patient was advised to abstain from driving should he feel sleepy or drowsy.    Follow up: MD/NP  3 month to re-evaluate Late June  after orthopaedic surgery to see if we need to do BPAP titration +/- MSLT. Will be eligible for the new machine in 2020. Consider BPAP.  Thank you for allowing me the opportunity to participate in the care of your patient.

## 2019-02-28 ENCOUNTER — OFFICE VISIT (OUTPATIENT)
Dept: ORTHOPEDICS | Facility: CLINIC | Age: 69
End: 2019-02-28
Payer: MEDICARE

## 2019-02-28 ENCOUNTER — TELEPHONE (OUTPATIENT)
Dept: CARDIOLOGY | Facility: CLINIC | Age: 69
End: 2019-02-28

## 2019-02-28 VITALS — HEIGHT: 62 IN | WEIGHT: 236 LBS | BODY MASS INDEX: 43.43 KG/M2

## 2019-02-28 DIAGNOSIS — M17.11 PRIMARY OSTEOARTHRITIS OF RIGHT KNEE: Primary | ICD-10-CM

## 2019-02-28 PROCEDURE — 99499 NO LOS: ICD-10-PCS | Mod: HCNC,S$GLB,, | Performed by: ORTHOPAEDIC SURGERY

## 2019-02-28 PROCEDURE — 99499 UNLISTED E&M SERVICE: CPT | Mod: HCNC,S$GLB,, | Performed by: ORTHOPAEDIC SURGERY

## 2019-02-28 PROCEDURE — 99999 PR PBB SHADOW E&M-EST. PATIENT-LVL III: ICD-10-PCS | Mod: PBBFAC,HCNC,, | Performed by: ORTHOPAEDIC SURGERY

## 2019-02-28 PROCEDURE — 99999 PR PBB SHADOW E&M-EST. PATIENT-LVL III: CPT | Mod: PBBFAC,HCNC,, | Performed by: ORTHOPAEDIC SURGERY

## 2019-02-28 NOTE — H&P
Subjective:       Patient ID: Vivienne Jose is a 68 y.o. female.    Chief Complaint: Pain of the Right Knee      Vivienne Jose is a 68 y.o. female with PMH significant for HTN , CAD, HLD, DM 2, BAM, and GERD.  She is here today for a pre-operative visit in preparation for a Right total knee arthroplasty to be performed by  Dr. Gamboa on 03/06/19.  Vivienne Jose has a greater than 3 year history of Right knee pain.  Pain is worse with activity and weight bearing. Patient has experienced interference of ADLs due to increased pain and decreased range of motion. Patient has failed non-operative treatment including NSAIDs, activity modification, corticosteroid injections, Synvisc injections. Vivienne Jose ambulates independently.  There has been no significant change in medical status since last visit. She has seen her cardiologist and endocrinologist within the last 2 months and her health is stable.     Past Medical History:   Diagnosis Date    Allergy     Angio-edema     Arthritis     Cataract     Cerebrovascular malformation     Colon polyps     Coronary artery disease     Diabetes mellitus, type 2     Diabetic peripheral neuropathy     GERD (gastroesophageal reflux disease)     Herpes infection     Hyperlipidemia     Hypertension     Hypothyroidism     Kidney stones     Nausea & vomiting 11/23/2015    Obesity, morbid     Ovarian cyst     Pyelonephritis, chronic     Seizure disorder, focal motor     Sleep apnea     Type II or unspecified type diabetes mellitus with neurological manifestations, uncontrolled(250.62)     Urinary tract infection     Urticaria     Vaginal infection      Past Surgical History:   Procedure Laterality Date    CARPAL TUNNEL RELEASE Right 2014    COLONOSCOPY      COLONOSCOPY N/A 8/29/2014    [facility]    COLONOSCOPY Golytely N/A 9/13/2017    [facility]    CYST REMOVAL      skin;  multiples    CYSTOSCOPY WITH STENT PLACEMENT Left 12/28/2016    [facility]    ENDOSCOPIC-RELEASE-CARPAL TUNNEL right Right 7/8/2014    [facility]    EXTRACORPOREAL SHOCK WAVE LITHOTRIPSY  2002    EXTRACTION-STONE-URETEROSCOPY Left 12/28/2016    [facility]    HYSTERECTOMY  1984    KIDNEY STONE SURGERY      LITHOTRIPSY-LASER Left 12/28/2016    [facility]    RELEASE-FINGER-TRIGGER right long finger Right 7/8/2014    [facility]    THYROIDECTOMY  1977         TONSILLECTOMY, ADENOIDECTOMY      TRIGGER FINGER RELEASE      TUBAL LIGATION       Family History   Problem Relation Age of Onset    Cancer Father     Arthritis Father     Gout Father     Allergies Son     Allergic rhinitis Son     Skin cancer Mother     Macular degeneration Mother     Dementia Mother     Hypertension Mother     No Known Problems Daughter     Diabetes Daughter     No Known Problems Daughter     Glaucoma Maternal Aunt         Great Maternal Aunt    Leukemia Maternal Uncle     Amblyopia Neg Hx     Cataracts Neg Hx     Retinal detachment Neg Hx     Strabismus Neg Hx     Stroke Neg Hx     Thyroid disease Neg Hx     Kidney disease Neg Hx     Angioedema Neg Hx     Asthma Neg Hx     Atopy Neg Hx     Eczema Neg Hx     Immunodeficiency Neg Hx     Rhinitis Neg Hx     Urticaria Neg Hx      Social History     Socioeconomic History    Marital status:      Spouse name: None    Number of children: None    Years of education: None    Highest education level: None   Social Needs    Financial resource strain: None    Food insecurity - worry: None    Food insecurity - inability: None    Transportation needs - medical: None    Transportation needs - non-medical: None   Occupational History    Occupation: retired     Employer:  [employer]   Tobacco Use    Smoking status: Never Smoker    Smokeless tobacco: Never Used   Substance and Sexual Activity    Alcohol use: No     Alcohol/week: 0.0 oz    Drug use: No    Sexual activity: Yes     Partners: Male   Other Topics Concern    Are you pregnant or think you may be? Not Asked    Breast-feeding Not Asked   Social History Narrative    None       Current Outpatient Medications   Medication Sig Dispense Refill    amLODIPine (NORVASC) 5 MG tablet Take 1 tablet (5 mg total) by mouth once daily. 90 tablet 3    aspirin (ECOTRIN) 81 MG EC tablet Take 81 mg by mouth once daily.      azelastine (ASTELIN) 137 mcg (0.1 %) nasal spray 2 sprays (274 mcg total) by Nasal route 2 (two) times daily. 90 mL 4    biotin 10,000 mcg Cap Take by mouth.      blood sugar diagnostic (TRUE METRIX GLUCOSE TEST STRIP) Strp TEST TWO TIMES DAILY 200 strip 6    blood-glucose meter Misc [employer] True Metrix Air meter 1 each 0    calcium carbonate-vitamin D3 600 mg(1,500mg) -100 unit Cap Take 1 tablet by mouth once daily.      chlorthalidone (HYGROTEN) 25 MG Tab Take 1 tablet (25 mg total) by mouth once daily. 90 tablet 1    diclofenac sodium (VOLTAREN) 1 % Gel Apply 2 g topically 4 (four) times daily. 3 Tube 3    econazole nitrate 1 % cream Mix with hydrocortisone cream 1% 30 grams use bid prn 60 g 3    fluticasone (FLONASE) 50 mcg/actuation nasal spray 2 sprays (100 mcg total) by Each Nare route once daily. 48 g 4    gabapentin (NEURONTIN) 300 MG capsule Take 2 capsules (600 mg total) by mouth 2 (two) times daily. 180 capsule 1    glucagon (human recombinant) inj 1mg/mL kit Inject 1 mL (1 mg total) into the muscle as needed. 1 kit 6    insulin degludec (TRESIBA FLEXTOUCH U-100) 100 unit/mL (3 mL) InPn Inject 22 Units into the skin every evening. 15 Syringe 4    ketotifen (ZADITOR) 0.025 % (0.035 %) ophthalmic solution Place 1-2 drops into both eyes once daily.      L.acid-B.bifidum-B.animal-FOS  "(PROBIOTIC COMPLEX) 25 billion cell -100 mg Cap Take 1 capsule by mouth once daily.      lancets (TRUEPLUS LANCETS) 28 gauge Misc Inject 1 lancet into the skin 2 (two) times daily before meals. 200 each 6    levothyroxine (SYNTHROID) 75 MCG tablet Take 1 tablet (75 mcg total) by mouth once daily. 90 tablet 3    liraglutide 0.6 mg/0.1 mL, 18 mg/3 mL, subq PNIJ (VICTOZA 3-TAWANA) 0.6 mg/0.1 mL (18 mg/3 mL) PnIj INJECT 1.8 MG INTO THE SKIN ONCE DAILY. 27 mL 11    loratadine (CLARITIN) 10 mg tablet Take 10 mg by mouth once daily.      lovastatin (MEVACOR) 40 MG tablet TAKE 1 TABLET NIGHTLY (SUBSTITUTED FOR MEVACOR) 90 tablet 0    magnesium oxide-Mg AA chelate (MAGNESIUM, AMINO ACID CHELATE,) 133 mg Tab Take by mouth as directed.       metFORMIN (GLUCOPHAGE) 1000 MG tablet Take 1 tablet (1,000 mg total) by mouth 2 (two) times daily with meals. 180 tablet 3    nitrofurantoin, macrocrystal-monohydrate, (MACROBID) 100 MG capsule Take 1 capsule (100 mg total) by mouth nightly. 14 capsule 0    omeprazole (PRILOSEC) 20 MG capsule Take 1 capsule (20 mg total) by mouth daily as needed. 90 capsule 1    pen needle, diabetic (BD ULTRA-FINE EDUARDO PEN NEEDLE) 32 gauge x 5/32" Ndle USE WITH VICTOZA AND LEVEMIR (2 INJECTIONS EVERY DAY) 100 each 3    potassium chloride SA (K-DUR,KLOR-CON) 10 MEQ tablet Take 10 meq PO QD except on Mondays, Wednesdays and Fridays, on which she should take 2 tabs (20 meq)  tablet 1    PREMARIN vaginal cream Place 0.5 g vaginally 3 (three) times a week. 30 g 3    valACYclovir (VALTREX) 500 MG tablet TAKE 1 TABLET ONE TIME DAILY 90 tablet 0    valsartan (DIOVAN) 160 MG tablet TAKE 1 TABLET TWICE DAILY 180 tablet 0     Current Facility-Administered Medications   Medication Dose Route Frequency Provider Last Rate Last Dose    Allergy Mix   Subcutaneous 1 time in Clinic/HOD Ailin Mills MD        Allergy Mix   Subcutaneous 1 time in Clinic/HOD Ailin Mills MD         Review of " "patient's allergies indicates:   Allergen Reactions    Sulfa (sulfonamide antibiotics) Nausea Only    Ciprofloxacin     Januvia [sitagliptin]      abd pain    Lisinopril     Lotensin [benazepril]     Nexium [esomeprazole magnesium] Other (See Comments)     Gas       Review of Systems   Constitutional: Negative for chills and fever.   HENT: Negative for trouble swallowing.    Respiratory: Positive for shortness of breath (chronic/ unchanged ). Negative for chest tightness and wheezing.    Cardiovascular: Negative for chest pain and palpitations.   Gastrointestinal: Negative for abdominal pain and blood in stool.   Genitourinary: Negative for dysuria and hematuria.   Musculoskeletal: Positive for arthralgias, back pain and gait problem.   Skin: Negative for rash and wound.   Allergic/Immunologic: Negative for immunocompromised state.   Neurological: Negative for syncope.   Psychiatric/Behavioral: Negative for confusion.       Objective:      Vitals:    02/28/19 1003   Weight: 107 kg (236 lb)   Height: 5' 2" (1.575 m)     Physical Exam   Constitutional: She is oriented to person, place, and time. She appears well-developed and well-nourished.   HENT:   Head: Normocephalic and atraumatic.   Eyes: Pupils are equal, round, and reactive to light.   Cardiovascular: Normal rate and regular rhythm. Exam reveals no gallop and no friction rub.   No murmur heard.  Pulmonary/Chest: Effort normal and breath sounds normal. No stridor. She has no wheezes. She has no rales.   Abdominal: Soft. There is no tenderness.   Neurological: She is alert and oriented to person, place, and time.   Skin: Skin is warm and dry. Capillary refill takes less than 2 seconds.   Psychiatric: She has a normal mood and affect. Her behavior is normal.     Right knee  The patient walks with a significant limp.  Hip irritability  negative.   The skin over the knee is intact.  Knee effusion trace  Tendernes is located medially  Range of motion- Flexion " 125 deg, Extension 0 deg,   Ligament laxity exam:   MCL 1+   Lachman negative   Post sag negative    LCL 0  Patellar apprehension negative.  Popliteal cyst negative  Patellar crepitation absent.  Flexion/pinch not applicable  Pulses DP intact, PT intact.  Motor normal 5/5 strength in all tested muscle groups.   Sensory normal.     Lab Review:   CBC:   Lab Results   Component Value Date    WBC 10.09 11/12/2018    RBC 4.41 11/12/2018    HGB 13.3 11/12/2018    HCT 41.8 11/12/2018     11/12/2018     BMP:   Lab Results   Component Value Date     02/08/2019     02/08/2019    K 3.6 02/08/2019     02/08/2019    CO2 30 (H) 02/08/2019    BUN 16 02/08/2019    CREATININE 0.8 02/08/2019    CALCIUM 10.0 02/08/2019     Lab Results   Component Value Date    HGBA1C 6.9 (H) 02/08/2019       Diagnostics Review: X-Ray: Reviewed     Assessment:       1. Primary osteoarthritis of right knee        Plan:       Chronic conditions are well controlled.  Plan for right total knee replacement on 03/06/2019.     Pre, taylor, and post operative procedures and expectations discussed. All questions were answered.   Vivienne Jose will contact us if there are any questions, concerns, or changes in medical status prior to surgery.

## 2019-02-28 NOTE — TELEPHONE ENCOUNTER
Patient is moderate risk for intermediate risk surgery to have right knee surgery. No contraindication such as recent MI, CHF, severe valve obstruction or tachyarrhythmias. METS<4. She does have DM, No CVA or CRF. Risk of cardiac event<0.9%     ----- Message from Meghana Navarro NP sent at 2/28/2019 10:46 AM CST -----  Ms. Jose is having right knee arthroplasty on 3/6/19.  She last saw you on 12/19/18.  She states that she spoke with you about her surgery.  Would you be able to place a cardiac risk stratification in Epic for anesthesia, please?    Thank you for your time,   PADMINI Fernandez

## 2019-02-28 NOTE — H&P (VIEW-ONLY)
Subjective:       Patient ID: Vivienne Jose is a 68 y.o. female.    Chief Complaint: Pain of the Right Knee      Vivienne Jose is a 68 y.o. female with PMH significant for HTN , CAD, HLD, DM 2, BAM, and GERD.  She is here today for a pre-operative visit in preparation for a Right total knee arthroplasty to be performed by  Dr. Gamboa on 03/06/19.  Vivienne Jose has a greater than 3 year history of Right knee pain.  Pain is worse with activity and weight bearing. Patient has experienced interference of ADLs due to increased pain and decreased range of motion. Patient has failed non-operative treatment including NSAIDs, activity modification, corticosteroid injections, Synvisc injections. Vivienne Jose ambulates independently.  There has been no significant change in medical status since last visit. She has seen her cardiologist and endocrinologist within the last 2 months and her health is stable.     Past Medical History:   Diagnosis Date    Allergy     Angio-edema     Arthritis     Cataract     Cerebrovascular malformation     Colon polyps     Coronary artery disease     Diabetes mellitus, type 2     Diabetic peripheral neuropathy     GERD (gastroesophageal reflux disease)     Herpes infection     Hyperlipidemia     Hypertension     Hypothyroidism     Kidney stones     Nausea & vomiting 11/23/2015    Obesity, morbid     Ovarian cyst     Pyelonephritis, chronic     Seizure disorder, focal motor     Sleep apnea     Type II or unspecified type diabetes mellitus with neurological manifestations, uncontrolled(250.62)     Urinary tract infection     Urticaria     Vaginal infection      Past Surgical History:   Procedure Laterality Date    CARPAL TUNNEL RELEASE Right 2014    COLONOSCOPY      COLONOSCOPY N/A 8/29/2014    [facility]    COLONOSCOPY Golytely N/A 9/13/2017    [facility]    CYST REMOVAL      skin;  multiples    CYSTOSCOPY WITH STENT PLACEMENT Left 12/28/2016    [facility]    ENDOSCOPIC-RELEASE-CARPAL TUNNEL right Right 7/8/2014    [facility]    EXTRACORPOREAL SHOCK WAVE LITHOTRIPSY  2002    EXTRACTION-STONE-URETEROSCOPY Left 12/28/2016    [facility]    HYSTERECTOMY  1984    KIDNEY STONE SURGERY      LITHOTRIPSY-LASER Left 12/28/2016    [facility]    RELEASE-FINGER-TRIGGER right long finger Right 7/8/2014    [facility]    THYROIDECTOMY  1977         TONSILLECTOMY, ADENOIDECTOMY      TRIGGER FINGER RELEASE      TUBAL LIGATION       Family History   Problem Relation Age of Onset    Cancer Father     Arthritis Father     Gout Father     Allergies Son     Allergic rhinitis Son     Skin cancer Mother     Macular degeneration Mother     Dementia Mother     Hypertension Mother     No Known Problems Daughter     Diabetes Daughter     No Known Problems Daughter     Glaucoma Maternal Aunt         Great Maternal Aunt    Leukemia Maternal Uncle     Amblyopia Neg Hx     Cataracts Neg Hx     Retinal detachment Neg Hx     Strabismus Neg Hx     Stroke Neg Hx     Thyroid disease Neg Hx     Kidney disease Neg Hx     Angioedema Neg Hx     Asthma Neg Hx     Atopy Neg Hx     Eczema Neg Hx     Immunodeficiency Neg Hx     Rhinitis Neg Hx     Urticaria Neg Hx      Social History     Socioeconomic History    Marital status:      Spouse name: None    Number of children: None    Years of education: None    Highest education level: None   Social Needs    Financial resource strain: None    Food insecurity - worry: None    Food insecurity - inability: None    Transportation needs - medical: None    Transportation needs - non-medical: None   Occupational History    Occupation: retired     Employer:  [employer]   Tobacco Use    Smoking status: Never Smoker    Smokeless tobacco: Never Used   Substance and Sexual Activity    Alcohol use: No     Alcohol/week: 0.0 oz    Drug use: No    Sexual activity: Yes     Partners: Male   Other Topics Concern    Are you pregnant or think you may be? Not Asked    Breast-feeding Not Asked   Social History Narrative    None       Current Outpatient Medications   Medication Sig Dispense Refill    amLODIPine (NORVASC) 5 MG tablet Take 1 tablet (5 mg total) by mouth once daily. 90 tablet 3    aspirin (ECOTRIN) 81 MG EC tablet Take 81 mg by mouth once daily.      azelastine (ASTELIN) 137 mcg (0.1 %) nasal spray 2 sprays (274 mcg total) by Nasal route 2 (two) times daily. 90 mL 4    biotin 10,000 mcg Cap Take by mouth.      blood sugar diagnostic (TRUE METRIX GLUCOSE TEST STRIP) Strp TEST TWO TIMES DAILY 200 strip 6    blood-glucose meter Misc [employer] True Metrix Air meter 1 each 0    calcium carbonate-vitamin D3 600 mg(1,500mg) -100 unit Cap Take 1 tablet by mouth once daily.      chlorthalidone (HYGROTEN) 25 MG Tab Take 1 tablet (25 mg total) by mouth once daily. 90 tablet 1    diclofenac sodium (VOLTAREN) 1 % Gel Apply 2 g topically 4 (four) times daily. 3 Tube 3    econazole nitrate 1 % cream Mix with hydrocortisone cream 1% 30 grams use bid prn 60 g 3    fluticasone (FLONASE) 50 mcg/actuation nasal spray 2 sprays (100 mcg total) by Each Nare route once daily. 48 g 4    gabapentin (NEURONTIN) 300 MG capsule Take 2 capsules (600 mg total) by mouth 2 (two) times daily. 180 capsule 1    glucagon (human recombinant) inj 1mg/mL kit Inject 1 mL (1 mg total) into the muscle as needed. 1 kit 6    insulin degludec (TRESIBA FLEXTOUCH U-100) 100 unit/mL (3 mL) InPn Inject 22 Units into the skin every evening. 15 Syringe 4    ketotifen (ZADITOR) 0.025 % (0.035 %) ophthalmic solution Place 1-2 drops into both eyes once daily.      L.acid-B.bifidum-B.animal-FOS  "(PROBIOTIC COMPLEX) 25 billion cell -100 mg Cap Take 1 capsule by mouth once daily.      lancets (TRUEPLUS LANCETS) 28 gauge Misc Inject 1 lancet into the skin 2 (two) times daily before meals. 200 each 6    levothyroxine (SYNTHROID) 75 MCG tablet Take 1 tablet (75 mcg total) by mouth once daily. 90 tablet 3    liraglutide 0.6 mg/0.1 mL, 18 mg/3 mL, subq PNIJ (VICTOZA 3-TAWANA) 0.6 mg/0.1 mL (18 mg/3 mL) PnIj INJECT 1.8 MG INTO THE SKIN ONCE DAILY. 27 mL 11    loratadine (CLARITIN) 10 mg tablet Take 10 mg by mouth once daily.      lovastatin (MEVACOR) 40 MG tablet TAKE 1 TABLET NIGHTLY (SUBSTITUTED FOR MEVACOR) 90 tablet 0    magnesium oxide-Mg AA chelate (MAGNESIUM, AMINO ACID CHELATE,) 133 mg Tab Take by mouth as directed.       metFORMIN (GLUCOPHAGE) 1000 MG tablet Take 1 tablet (1,000 mg total) by mouth 2 (two) times daily with meals. 180 tablet 3    nitrofurantoin, macrocrystal-monohydrate, (MACROBID) 100 MG capsule Take 1 capsule (100 mg total) by mouth nightly. 14 capsule 0    omeprazole (PRILOSEC) 20 MG capsule Take 1 capsule (20 mg total) by mouth daily as needed. 90 capsule 1    pen needle, diabetic (BD ULTRA-FINE EDUARDO PEN NEEDLE) 32 gauge x 5/32" Ndle USE WITH VICTOZA AND LEVEMIR (2 INJECTIONS EVERY DAY) 100 each 3    potassium chloride SA (K-DUR,KLOR-CON) 10 MEQ tablet Take 10 meq PO QD except on Mondays, Wednesdays and Fridays, on which she should take 2 tabs (20 meq)  tablet 1    PREMARIN vaginal cream Place 0.5 g vaginally 3 (three) times a week. 30 g 3    valACYclovir (VALTREX) 500 MG tablet TAKE 1 TABLET ONE TIME DAILY 90 tablet 0    valsartan (DIOVAN) 160 MG tablet TAKE 1 TABLET TWICE DAILY 180 tablet 0     Current Facility-Administered Medications   Medication Dose Route Frequency Provider Last Rate Last Dose    Allergy Mix   Subcutaneous 1 time in Clinic/HOD Ailin Mills MD        Allergy Mix   Subcutaneous 1 time in Clinic/HOD Ailin Mills MD         Review of " "patient's allergies indicates:   Allergen Reactions    Sulfa (sulfonamide antibiotics) Nausea Only    Ciprofloxacin     Januvia [sitagliptin]      abd pain    Lisinopril     Lotensin [benazepril]     Nexium [esomeprazole magnesium] Other (See Comments)     Gas       Review of Systems   Constitutional: Negative for chills and fever.   HENT: Negative for trouble swallowing.    Respiratory: Positive for shortness of breath (chronic/ unchanged ). Negative for chest tightness and wheezing.    Cardiovascular: Negative for chest pain and palpitations.   Gastrointestinal: Negative for abdominal pain and blood in stool.   Genitourinary: Negative for dysuria and hematuria.   Musculoskeletal: Positive for arthralgias, back pain and gait problem.   Skin: Negative for rash and wound.   Allergic/Immunologic: Negative for immunocompromised state.   Neurological: Negative for syncope.   Psychiatric/Behavioral: Negative for confusion.       Objective:      Vitals:    02/28/19 1003   Weight: 107 kg (236 lb)   Height: 5' 2" (1.575 m)     Physical Exam   Constitutional: She is oriented to person, place, and time. She appears well-developed and well-nourished.   HENT:   Head: Normocephalic and atraumatic.   Eyes: Pupils are equal, round, and reactive to light.   Cardiovascular: Normal rate and regular rhythm. Exam reveals no gallop and no friction rub.   No murmur heard.  Pulmonary/Chest: Effort normal and breath sounds normal. No stridor. She has no wheezes. She has no rales.   Abdominal: Soft. There is no tenderness.   Neurological: She is alert and oriented to person, place, and time.   Skin: Skin is warm and dry. Capillary refill takes less than 2 seconds.   Psychiatric: She has a normal mood and affect. Her behavior is normal.     Right knee  The patient walks with a significant limp.  Hip irritability  negative.   The skin over the knee is intact.  Knee effusion trace  Tendernes is located medially  Range of motion- Flexion " 125 deg, Extension 0 deg,   Ligament laxity exam:   MCL 1+   Lachman negative   Post sag negative    LCL 0  Patellar apprehension negative.  Popliteal cyst negative  Patellar crepitation absent.  Flexion/pinch not applicable  Pulses DP intact, PT intact.  Motor normal 5/5 strength in all tested muscle groups.   Sensory normal.     Lab Review:   CBC:   Lab Results   Component Value Date    WBC 10.09 11/12/2018    RBC 4.41 11/12/2018    HGB 13.3 11/12/2018    HCT 41.8 11/12/2018     11/12/2018     BMP:   Lab Results   Component Value Date     02/08/2019     02/08/2019    K 3.6 02/08/2019     02/08/2019    CO2 30 (H) 02/08/2019    BUN 16 02/08/2019    CREATININE 0.8 02/08/2019    CALCIUM 10.0 02/08/2019     Lab Results   Component Value Date    HGBA1C 6.9 (H) 02/08/2019       Diagnostics Review: X-Ray: Reviewed     Assessment:       1. Primary osteoarthritis of right knee        Plan:       Chronic conditions are well controlled.  Plan for right total knee replacement on 03/06/2019.     Pre, taylor, and post operative procedures and expectations discussed. All questions were answered.   Vivienne Jose will contact us if there are any questions, concerns, or changes in medical status prior to surgery.

## 2019-03-01 ENCOUNTER — CLINICAL SUPPORT (OUTPATIENT)
Dept: INTERNAL MEDICINE | Facility: CLINIC | Age: 69
End: 2019-03-01
Payer: MEDICARE

## 2019-03-01 DIAGNOSIS — J30.9 CHRONIC ALLERGIC RHINITIS: ICD-10-CM

## 2019-03-01 PROCEDURE — 99499 UNLISTED E&M SERVICE: CPT | Mod: HCNC,S$GLB,, | Performed by: ALLERGY & IMMUNOLOGY

## 2019-03-01 PROCEDURE — 95115 PR IMMUNOTHERAPY, ONE INJECTION: ICD-10-PCS | Mod: HCNC,S$GLB,, | Performed by: FAMILY MEDICINE

## 2019-03-01 PROCEDURE — 99499 NO LOS: ICD-10-PCS | Mod: HCNC,S$GLB,, | Performed by: ALLERGY & IMMUNOLOGY

## 2019-03-01 PROCEDURE — 95115 IMMUNOTHERAPY ONE INJECTION: CPT | Mod: HCNC,S$GLB,, | Performed by: FAMILY MEDICINE

## 2019-03-06 ENCOUNTER — HOSPITAL ENCOUNTER (OUTPATIENT)
Facility: HOSPITAL | Age: 69
Discharge: HOME | End: 2019-03-07
Attending: ORTHOPAEDIC SURGERY | Admitting: ORTHOPAEDIC SURGERY
Payer: MEDICARE

## 2019-03-06 ENCOUNTER — ANESTHESIA (OUTPATIENT)
Dept: SURGERY | Facility: HOSPITAL | Age: 69
End: 2019-03-06
Payer: MEDICARE

## 2019-03-06 DIAGNOSIS — Z96.659 STATUS POST TOTAL KNEE REPLACEMENT, UNSPECIFIED LATERALITY: Primary | ICD-10-CM

## 2019-03-06 DIAGNOSIS — M17.11 PRIMARY OSTEOARTHRITIS OF RIGHT KNEE: ICD-10-CM

## 2019-03-06 LAB
BASOPHILS # BLD AUTO: 0.03 K/UL
BASOPHILS NFR BLD: 0.3 %
DIFFERENTIAL METHOD: NORMAL
EOSINOPHIL # BLD AUTO: 0.3 K/UL
EOSINOPHIL NFR BLD: 3.4 %
ERYTHROCYTE [DISTWIDTH] IN BLOOD BY AUTOMATED COUNT: 14.4 %
HCT VFR BLD AUTO: 39.4 %
HGB BLD-MCNC: 12.7 G/DL
LYMPHOCYTES # BLD AUTO: 2.4 K/UL
LYMPHOCYTES NFR BLD: 26 %
MCH RBC QN AUTO: 30.4 PG
MCHC RBC AUTO-ENTMCNC: 32.2 G/DL
MCV RBC AUTO: 94 FL
MONOCYTES # BLD AUTO: 1 K/UL
MONOCYTES NFR BLD: 11.3 %
NEUTROPHILS # BLD AUTO: 5.4 K/UL
NEUTROPHILS NFR BLD: 58.9 %
PLATELET # BLD AUTO: 274 K/UL
PMV BLD AUTO: 9.6 FL
POCT GLUCOSE: 138 MG/DL (ref 70–110)
POCT GLUCOSE: 189 MG/DL (ref 70–110)
POCT GLUCOSE: 217 MG/DL (ref 70–110)
RBC # BLD AUTO: 4.18 M/UL
WBC # BLD AUTO: 9.11 K/UL

## 2019-03-06 PROCEDURE — 63600175 PHARM REV CODE 636 W HCPCS: Mod: HCNC | Performed by: STUDENT IN AN ORGANIZED HEALTH CARE EDUCATION/TRAINING PROGRAM

## 2019-03-06 PROCEDURE — 36415 COLL VENOUS BLD VENIPUNCTURE: CPT | Mod: HCNC

## 2019-03-06 PROCEDURE — 25000003 PHARM REV CODE 250: Mod: HCNC | Performed by: ORTHOPAEDIC SURGERY

## 2019-03-06 PROCEDURE — 71000039 HC RECOVERY, EACH ADD'L HOUR: Mod: HCNC | Performed by: ORTHOPAEDIC SURGERY

## 2019-03-06 PROCEDURE — 25000003 PHARM REV CODE 250: Mod: HCNC | Performed by: STUDENT IN AN ORGANIZED HEALTH CARE EDUCATION/TRAINING PROGRAM

## 2019-03-06 PROCEDURE — 97165 OT EVAL LOW COMPLEX 30 MIN: CPT | Mod: HCNC

## 2019-03-06 PROCEDURE — 27000221 HC OXYGEN, UP TO 24 HOURS: Mod: HCNC

## 2019-03-06 PROCEDURE — 27447 TOTAL KNEE ARTHROPLASTY: CPT | Mod: AS,HCNC,RT, | Performed by: ORTHOPAEDIC SURGERY

## 2019-03-06 PROCEDURE — 94660 CPAP INITIATION&MGMT: CPT | Mod: HCNC

## 2019-03-06 PROCEDURE — 27447 PR TOTAL KNEE ARTHROPLASTY: ICD-10-PCS | Mod: AS,HCNC,RT, | Performed by: ORTHOPAEDIC SURGERY

## 2019-03-06 PROCEDURE — 97116 GAIT TRAINING THERAPY: CPT | Mod: HCNC

## 2019-03-06 PROCEDURE — 27000190 HC CPAP FULL FACE MASK W/VALVE: Mod: HCNC

## 2019-03-06 PROCEDURE — 27447 TOTAL KNEE ARTHROPLASTY: CPT | Mod: HCNC,RT,, | Performed by: ORTHOPAEDIC SURGERY

## 2019-03-06 PROCEDURE — C1776 JOINT DEVICE (IMPLANTABLE): HCPCS | Mod: HCNC | Performed by: ORTHOPAEDIC SURGERY

## 2019-03-06 PROCEDURE — 27201423 OPTIME MED/SURG SUP & DEVICES STERILE SUPPLY: Mod: HCNC | Performed by: ORTHOPAEDIC SURGERY

## 2019-03-06 PROCEDURE — 36000710: Mod: HCNC | Performed by: ORTHOPAEDIC SURGERY

## 2019-03-06 PROCEDURE — C1713 ANCHOR/SCREW BN/BN,TIS/BN: HCPCS | Mod: HCNC | Performed by: ORTHOPAEDIC SURGERY

## 2019-03-06 PROCEDURE — 85025 COMPLETE CBC W/AUTO DIFF WBC: CPT | Mod: HCNC

## 2019-03-06 PROCEDURE — 37000008 HC ANESTHESIA 1ST 15 MINUTES: Mod: HCNC | Performed by: ORTHOPAEDIC SURGERY

## 2019-03-06 PROCEDURE — 94761 N-INVAS EAR/PLS OXIMETRY MLT: CPT | Mod: HCNC

## 2019-03-06 PROCEDURE — 99900035 HC TECH TIME PER 15 MIN (STAT): Mod: HCNC

## 2019-03-06 PROCEDURE — 63600175 PHARM REV CODE 636 W HCPCS: Mod: HCNC | Performed by: ORTHOPAEDIC SURGERY

## 2019-03-06 PROCEDURE — 97161 PT EVAL LOW COMPLEX 20 MIN: CPT | Mod: HCNC

## 2019-03-06 PROCEDURE — 36000711: Mod: HCNC | Performed by: ORTHOPAEDIC SURGERY

## 2019-03-06 PROCEDURE — 25000242 PHARM REV CODE 250 ALT 637 W/ HCPCS: Mod: HCNC | Performed by: ORTHOPAEDIC SURGERY

## 2019-03-06 PROCEDURE — 25000003 PHARM REV CODE 250: Mod: HCNC | Performed by: NURSE PRACTITIONER

## 2019-03-06 PROCEDURE — 27447 PR TOTAL KNEE ARTHROPLASTY: ICD-10-PCS | Mod: HCNC,RT,, | Performed by: ORTHOPAEDIC SURGERY

## 2019-03-06 PROCEDURE — 37000009 HC ANESTHESIA EA ADD 15 MINS: Mod: HCNC | Performed by: ORTHOPAEDIC SURGERY

## 2019-03-06 PROCEDURE — 71000033 HC RECOVERY, INTIAL HOUR: Mod: HCNC | Performed by: ORTHOPAEDIC SURGERY

## 2019-03-06 DEVICE — KIT PIN STEINMANN PFC .025X5IN: Type: IMPLANTABLE DEVICE | Site: KNEE | Status: FUNCTIONAL

## 2019-03-06 DEVICE — TRAY TIBIAL CEMENTED 2.5 COBAL: Type: IMPLANTABLE DEVICE | Site: KNEE | Status: FUNCTIONAL

## 2019-03-06 DEVICE — INSERT TIB STBL SIGMA SZ2.5: Type: IMPLANTABLE DEVICE | Site: KNEE | Status: FUNCTIONAL

## 2019-03-06 DEVICE — COMPONENT PATELLA PFCSIG 32MM: Type: IMPLANTABLE DEVICE | Site: KNEE | Status: FUNCTIONAL

## 2019-03-06 DEVICE — CEMENT BONE SMARTSET HV 40 GR.: Type: IMPLANTABLE DEVICE | Site: KNEE | Status: FUNCTIONAL

## 2019-03-06 RX ORDER — LACTULOSE 10 G/15ML
20 SOLUTION ORAL EVERY 6 HOURS PRN
Status: DISCONTINUED | OUTPATIENT
Start: 2019-03-06 | End: 2019-03-07 | Stop reason: HOSPADM

## 2019-03-06 RX ORDER — SODIUM CHLORIDE 0.9 % (FLUSH) 0.9 %
3 SYRINGE (ML) INJECTION
Status: DISCONTINUED | OUTPATIENT
Start: 2019-03-06 | End: 2019-03-06

## 2019-03-06 RX ORDER — NAPROXEN 500 MG/1
500 TABLET ORAL
Status: COMPLETED | OUTPATIENT
Start: 2019-03-06 | End: 2019-03-06

## 2019-03-06 RX ORDER — HYDROMORPHONE HYDROCHLORIDE 2 MG/ML
0.2 INJECTION, SOLUTION INTRAMUSCULAR; INTRAVENOUS; SUBCUTANEOUS EVERY 5 MIN PRN
Status: DISCONTINUED | OUTPATIENT
Start: 2019-03-06 | End: 2019-03-06

## 2019-03-06 RX ORDER — CHLORTHALIDONE 25 MG/1
25 TABLET ORAL DAILY
Status: DISCONTINUED | OUTPATIENT
Start: 2019-03-06 | End: 2019-03-07 | Stop reason: HOSPADM

## 2019-03-06 RX ORDER — OXYCODONE HYDROCHLORIDE 5 MG/1
5 TABLET ORAL
Status: DISCONTINUED | OUTPATIENT
Start: 2019-03-06 | End: 2019-03-07 | Stop reason: HOSPADM

## 2019-03-06 RX ORDER — ONDANSETRON 4 MG/1
4 TABLET, ORALLY DISINTEGRATING ORAL EVERY 6 HOURS PRN
Status: DISCONTINUED | OUTPATIENT
Start: 2019-03-06 | End: 2019-03-07 | Stop reason: HOSPADM

## 2019-03-06 RX ORDER — PREGABALIN 75 MG/1
150 CAPSULE ORAL
Status: COMPLETED | OUTPATIENT
Start: 2019-03-06 | End: 2019-03-06

## 2019-03-06 RX ORDER — CEFAZOLIN SODIUM 1 G/3ML
INJECTION, POWDER, FOR SOLUTION INTRAMUSCULAR; INTRAVENOUS
Status: DISCONTINUED | OUTPATIENT
Start: 2019-03-06 | End: 2019-03-06

## 2019-03-06 RX ORDER — OXYCODONE HYDROCHLORIDE 5 MG/1
10 TABLET ORAL
Status: DISCONTINUED | OUTPATIENT
Start: 2019-03-06 | End: 2019-03-07 | Stop reason: HOSPADM

## 2019-03-06 RX ORDER — MIDAZOLAM HYDROCHLORIDE 1 MG/ML
INJECTION, SOLUTION INTRAMUSCULAR; INTRAVENOUS
Status: DISCONTINUED | OUTPATIENT
Start: 2019-03-06 | End: 2019-03-06

## 2019-03-06 RX ORDER — LIDOCAINE HYDROCHLORIDE 10 MG/ML
1 INJECTION, SOLUTION EPIDURAL; INFILTRATION; INTRACAUDAL; PERINEURAL ONCE
Status: DISCONTINUED | OUTPATIENT
Start: 2019-03-06 | End: 2019-03-06

## 2019-03-06 RX ORDER — AMLODIPINE BESYLATE 5 MG/1
5 TABLET ORAL DAILY
Status: DISCONTINUED | OUTPATIENT
Start: 2019-03-06 | End: 2019-03-07 | Stop reason: HOSPADM

## 2019-03-06 RX ORDER — IBUPROFEN 200 MG
24 TABLET ORAL
Status: DISCONTINUED | OUTPATIENT
Start: 2019-03-06 | End: 2019-03-07 | Stop reason: HOSPADM

## 2019-03-06 RX ORDER — BUPIVACAINE HYDROCHLORIDE 7.5 MG/ML
INJECTION, SOLUTION EPIDURAL; RETROBULBAR
Status: COMPLETED | OUTPATIENT
Start: 2019-03-06 | End: 2019-03-06

## 2019-03-06 RX ORDER — IBUPROFEN 200 MG
16 TABLET ORAL
Status: DISCONTINUED | OUTPATIENT
Start: 2019-03-06 | End: 2019-03-07 | Stop reason: HOSPADM

## 2019-03-06 RX ORDER — INSULIN ASPART 100 [IU]/ML
1-10 INJECTION, SOLUTION INTRAVENOUS; SUBCUTANEOUS
Status: DISCONTINUED | OUTPATIENT
Start: 2019-03-06 | End: 2019-03-07 | Stop reason: HOSPADM

## 2019-03-06 RX ORDER — SODIUM CHLORIDE, SODIUM LACTATE, POTASSIUM CHLORIDE, CALCIUM CHLORIDE 600; 310; 30; 20 MG/100ML; MG/100ML; MG/100ML; MG/100ML
INJECTION, SOLUTION INTRAVENOUS CONTINUOUS PRN
Status: DISCONTINUED | OUTPATIENT
Start: 2019-03-06 | End: 2019-03-06

## 2019-03-06 RX ORDER — SODIUM CHLORIDE 9 MG/ML
INJECTION, SOLUTION INTRAVENOUS CONTINUOUS
Status: DISCONTINUED | OUTPATIENT
Start: 2019-03-06 | End: 2019-03-07

## 2019-03-06 RX ORDER — CETIRIZINE HYDROCHLORIDE 10 MG/1
10 TABLET ORAL DAILY
Status: DISCONTINUED | OUTPATIENT
Start: 2019-03-06 | End: 2019-03-07 | Stop reason: HOSPADM

## 2019-03-06 RX ORDER — GLUCAGON 1 MG
1 KIT INJECTION
Status: DISCONTINUED | OUTPATIENT
Start: 2019-03-06 | End: 2019-03-07 | Stop reason: HOSPADM

## 2019-03-06 RX ORDER — FLUTICASONE PROPIONATE 50 MCG
2 SPRAY, SUSPENSION (ML) NASAL DAILY
Status: DISCONTINUED | OUTPATIENT
Start: 2019-03-06 | End: 2019-03-07 | Stop reason: HOSPADM

## 2019-03-06 RX ORDER — TRANEXAMIC ACID 100 MG/ML
INJECTION, SOLUTION INTRAVENOUS
Status: DISCONTINUED | OUTPATIENT
Start: 2019-03-06 | End: 2019-03-06

## 2019-03-06 RX ORDER — RAMELTEON 8 MG/1
8 TABLET ORAL NIGHTLY PRN
Status: DISCONTINUED | OUTPATIENT
Start: 2019-03-06 | End: 2019-03-07 | Stop reason: HOSPADM

## 2019-03-06 RX ORDER — SODIUM CHLORIDE 9 MG/ML
INJECTION, SOLUTION INTRAVENOUS CONTINUOUS
Status: DISCONTINUED | OUTPATIENT
Start: 2019-03-06 | End: 2019-03-06

## 2019-03-06 RX ORDER — CEFAZOLIN SODIUM 2 G/50ML
2 SOLUTION INTRAVENOUS
Status: COMPLETED | OUTPATIENT
Start: 2019-03-06 | End: 2019-03-06

## 2019-03-06 RX ORDER — SODIUM CHLORIDE, SODIUM LACTATE, POTASSIUM CHLORIDE, CALCIUM CHLORIDE 600; 310; 30; 20 MG/100ML; MG/100ML; MG/100ML; MG/100ML
INJECTION, SOLUTION INTRAVENOUS CONTINUOUS
Status: DISCONTINUED | OUTPATIENT
Start: 2019-03-06 | End: 2019-03-06

## 2019-03-06 RX ORDER — SODIUM CHLORIDE 0.9 % (FLUSH) 0.9 %
3 SYRINGE (ML) INJECTION EVERY 8 HOURS
Status: DISCONTINUED | OUTPATIENT
Start: 2019-03-06 | End: 2019-03-06

## 2019-03-06 RX ORDER — HYDROMORPHONE HYDROCHLORIDE 2 MG/ML
0.5 INJECTION, SOLUTION INTRAMUSCULAR; INTRAVENOUS; SUBCUTANEOUS EVERY 6 HOURS PRN
Status: DISCONTINUED | OUTPATIENT
Start: 2019-03-06 | End: 2019-03-07 | Stop reason: HOSPADM

## 2019-03-06 RX ORDER — ACETAMINOPHEN 500 MG
1000 TABLET ORAL 3 TIMES DAILY
Status: DISCONTINUED | OUTPATIENT
Start: 2019-03-06 | End: 2019-03-07 | Stop reason: HOSPADM

## 2019-03-06 RX ORDER — BISACODYL 10 MG
10 SUPPOSITORY, RECTAL RECTAL DAILY PRN
Status: DISCONTINUED | OUTPATIENT
Start: 2019-03-06 | End: 2019-03-07 | Stop reason: HOSPADM

## 2019-03-06 RX ORDER — OXYCODONE HYDROCHLORIDE 5 MG/1
5 TABLET ORAL
Status: DISCONTINUED | OUTPATIENT
Start: 2019-03-06 | End: 2019-03-06

## 2019-03-06 RX ORDER — PROPOFOL 10 MG/ML
VIAL (ML) INTRAVENOUS CONTINUOUS PRN
Status: DISCONTINUED | OUTPATIENT
Start: 2019-03-06 | End: 2019-03-06

## 2019-03-06 RX ORDER — POLYETHYLENE GLYCOL 3350 17 G/17G
17 POWDER, FOR SOLUTION ORAL DAILY
Status: DISCONTINUED | OUTPATIENT
Start: 2019-03-06 | End: 2019-03-07 | Stop reason: HOSPADM

## 2019-03-06 RX ORDER — CEFAZOLIN SODIUM 2 G/50ML
2 SOLUTION INTRAVENOUS
Status: DISCONTINUED | OUTPATIENT
Start: 2019-03-06 | End: 2019-03-06

## 2019-03-06 RX ORDER — ONDANSETRON 2 MG/ML
INJECTION INTRAMUSCULAR; INTRAVENOUS
Status: DISCONTINUED | OUTPATIENT
Start: 2019-03-06 | End: 2019-03-06

## 2019-03-06 RX ORDER — FENTANYL CITRATE 50 UG/ML
INJECTION, SOLUTION INTRAMUSCULAR; INTRAVENOUS
Status: DISCONTINUED | OUTPATIENT
Start: 2019-03-06 | End: 2019-03-06

## 2019-03-06 RX ORDER — ACETAMINOPHEN 500 MG
1000 TABLET ORAL
Status: COMPLETED | OUTPATIENT
Start: 2019-03-06 | End: 2019-03-06

## 2019-03-06 RX ORDER — LIDOCAINE HCL/PF 100 MG/5ML
SYRINGE (ML) INTRAVENOUS
Status: DISCONTINUED | OUTPATIENT
Start: 2019-03-06 | End: 2019-03-06

## 2019-03-06 RX ORDER — LEVOTHYROXINE SODIUM 75 UG/1
75 TABLET ORAL
Status: DISCONTINUED | OUTPATIENT
Start: 2019-03-07 | End: 2019-03-07 | Stop reason: HOSPADM

## 2019-03-06 RX ORDER — METFORMIN HYDROCHLORIDE 500 MG/1
1000 TABLET ORAL 2 TIMES DAILY WITH MEALS
Status: DISCONTINUED | OUTPATIENT
Start: 2019-03-06 | End: 2019-03-07 | Stop reason: HOSPADM

## 2019-03-06 RX ORDER — MIDAZOLAM HYDROCHLORIDE 1 MG/ML
INJECTION INTRAMUSCULAR; INTRAVENOUS
Status: COMPLETED
Start: 2019-03-06 | End: 2019-03-06

## 2019-03-06 RX ORDER — ASPIRIN 81 MG/1
81 TABLET ORAL DAILY
Status: DISCONTINUED | OUTPATIENT
Start: 2019-03-06 | End: 2019-03-07 | Stop reason: HOSPADM

## 2019-03-06 RX ORDER — OXYCODONE HCL 10 MG/1
10 TABLET, FILM COATED, EXTENDED RELEASE ORAL
Status: COMPLETED | OUTPATIENT
Start: 2019-03-06 | End: 2019-03-06

## 2019-03-06 RX ORDER — MORPHINE SULFATE 4 MG/ML
2 INJECTION, SOLUTION INTRAMUSCULAR; INTRAVENOUS EVERY 5 MIN PRN
Status: DISCONTINUED | OUTPATIENT
Start: 2019-03-06 | End: 2019-03-06

## 2019-03-06 RX ORDER — AZELASTINE 1 MG/ML
2 SPRAY, METERED NASAL 2 TIMES DAILY
Status: DISCONTINUED | OUTPATIENT
Start: 2019-03-06 | End: 2019-03-07 | Stop reason: HOSPADM

## 2019-03-06 RX ORDER — GABAPENTIN 300 MG/1
600 CAPSULE ORAL 2 TIMES DAILY
Status: DISCONTINUED | OUTPATIENT
Start: 2019-03-06 | End: 2019-03-07 | Stop reason: HOSPADM

## 2019-03-06 RX ORDER — TRANEXAMIC ACID 100 MG/ML
INJECTION, SOLUTION INTRAVENOUS
Status: DISCONTINUED | OUTPATIENT
Start: 2019-03-06 | End: 2019-03-06 | Stop reason: HOSPADM

## 2019-03-06 RX ORDER — AMOXICILLIN 250 MG
1 CAPSULE ORAL 2 TIMES DAILY
Status: DISCONTINUED | OUTPATIENT
Start: 2019-03-06 | End: 2019-03-07 | Stop reason: HOSPADM

## 2019-03-06 RX ORDER — POTASSIUM CHLORIDE 750 MG/1
10 TABLET, EXTENDED RELEASE ORAL DAILY
Status: DISCONTINUED | OUTPATIENT
Start: 2019-03-06 | End: 2019-03-07

## 2019-03-06 RX ORDER — ONDANSETRON 2 MG/ML
4 INJECTION INTRAMUSCULAR; INTRAVENOUS EVERY 6 HOURS PRN
Status: DISCONTINUED | OUTPATIENT
Start: 2019-03-06 | End: 2019-03-07 | Stop reason: HOSPADM

## 2019-03-06 RX ORDER — PRAVASTATIN SODIUM 40 MG/1
40 TABLET ORAL NIGHTLY
Status: DISCONTINUED | OUTPATIENT
Start: 2019-03-06 | End: 2019-03-07 | Stop reason: HOSPADM

## 2019-03-06 RX ORDER — FAMOTIDINE 20 MG/1
20 TABLET, FILM COATED ORAL 2 TIMES DAILY
Status: DISCONTINUED | OUTPATIENT
Start: 2019-03-06 | End: 2019-03-07 | Stop reason: HOSPADM

## 2019-03-06 RX ORDER — SODIUM CHLORIDE 0.9 % (FLUSH) 0.9 %
5 SYRINGE (ML) INJECTION
Status: DISCONTINUED | OUTPATIENT
Start: 2019-03-06 | End: 2019-03-07 | Stop reason: HOSPADM

## 2019-03-06 RX ORDER — PROMETHAZINE HYDROCHLORIDE 25 MG/ML
6.25 INJECTION, SOLUTION INTRAMUSCULAR; INTRAVENOUS EVERY 6 HOURS PRN
Status: DISCONTINUED | OUTPATIENT
Start: 2019-03-06 | End: 2019-03-06 | Stop reason: HOSPADM

## 2019-03-06 RX ADMIN — SODIUM CHLORIDE, SODIUM LACTATE, POTASSIUM CHLORIDE, AND CALCIUM CHLORIDE: .6; .31; .03; .02 INJECTION, SOLUTION INTRAVENOUS at 06:03

## 2019-03-06 RX ADMIN — GABAPENTIN 600 MG: 300 CAPSULE ORAL at 07:03

## 2019-03-06 RX ADMIN — NAPROXEN 500 MG: 500 TABLET ORAL at 06:03

## 2019-03-06 RX ADMIN — AMLODIPINE BESYLATE 5 MG: 5 TABLET ORAL at 12:03

## 2019-03-06 RX ADMIN — PROPOFOL 25 MCG/KG/MIN: 10 INJECTION, EMULSION INTRAVENOUS at 07:03

## 2019-03-06 RX ADMIN — SODIUM CHLORIDE, SODIUM LACTATE, POTASSIUM CHLORIDE, AND CALCIUM CHLORIDE: .6; .31; .03; .02 INJECTION, SOLUTION INTRAVENOUS at 07:03

## 2019-03-06 RX ADMIN — POTASSIUM CHLORIDE 10 MEQ: 750 TABLET, EXTENDED RELEASE ORAL at 12:03

## 2019-03-06 RX ADMIN — ACETAMINOPHEN 1000 MG: 500 TABLET ORAL at 02:03

## 2019-03-06 RX ADMIN — CEFAZOLIN 2 G: 330 INJECTION, POWDER, FOR SOLUTION INTRAMUSCULAR; INTRAVENOUS at 07:03

## 2019-03-06 RX ADMIN — ROPIVACAINE HYDROCHLORIDE: 2 INJECTION, SOLUTION EPIDURAL; INFILTRATION at 12:03

## 2019-03-06 RX ADMIN — FAMOTIDINE 20 MG: 20 TABLET, FILM COATED ORAL at 07:03

## 2019-03-06 RX ADMIN — STANDARDIZED SENNA CONCENTRATE AND DOCUSATE SODIUM 1 TABLET: 8.6; 5 TABLET, FILM COATED ORAL at 07:03

## 2019-03-06 RX ADMIN — LIDOCAINE HYDROCHLORIDE 100 MG: 20 INJECTION, SOLUTION INTRAVENOUS at 07:03

## 2019-03-06 RX ADMIN — AZELASTINE HYDROCHLORIDE 274 MCG: 137 SPRAY, METERED NASAL at 07:03

## 2019-03-06 RX ADMIN — SODIUM CHLORIDE: 0.9 INJECTION, SOLUTION INTRAVENOUS at 11:03

## 2019-03-06 RX ADMIN — FLUTICASONE PROPIONATE 100 MCG: 50 SPRAY, METERED NASAL at 12:03

## 2019-03-06 RX ADMIN — TRANEXAMIC ACID 1000 MG: 100 INJECTION, SOLUTION INTRAVENOUS at 07:03

## 2019-03-06 RX ADMIN — GABAPENTIN 600 MG: 300 CAPSULE ORAL at 12:03

## 2019-03-06 RX ADMIN — FAMOTIDINE 20 MG: 20 TABLET, FILM COATED ORAL at 12:03

## 2019-03-06 RX ADMIN — PRAVASTATIN SODIUM 40 MG: 40 TABLET ORAL at 07:03

## 2019-03-06 RX ADMIN — CEFAZOLIN SODIUM 2 G: 2 SOLUTION INTRAVENOUS at 10:03

## 2019-03-06 RX ADMIN — ONDANSETRON 4 MG: 2 INJECTION, SOLUTION INTRAMUSCULAR; INTRAVENOUS at 09:03

## 2019-03-06 RX ADMIN — CEFAZOLIN SODIUM 2 G: 2 SOLUTION INTRAVENOUS at 02:03

## 2019-03-06 RX ADMIN — ASPIRIN 81 MG: 81 TABLET, COATED ORAL at 12:03

## 2019-03-06 RX ADMIN — ROPIVACAINE HYDROCHLORIDE 20 ML: 2 INJECTION, SOLUTION EPIDURAL; INFILTRATION at 09:03

## 2019-03-06 RX ADMIN — CETIRIZINE HYDROCHLORIDE 10 MG: 10 TABLET, FILM COATED ORAL at 12:03

## 2019-03-06 RX ADMIN — CHLORTHALIDONE 25 MG: 25 TABLET ORAL at 12:03

## 2019-03-06 RX ADMIN — ACETAMINOPHEN 1000 MG: 500 TABLET ORAL at 06:03

## 2019-03-06 RX ADMIN — FENTANYL CITRATE 10 MCG: 50 INJECTION, SOLUTION INTRAMUSCULAR; INTRAVENOUS at 07:03

## 2019-03-06 RX ADMIN — OXYCODONE HYDROCHLORIDE 10 MG: 10 TABLET, FILM COATED, EXTENDED RELEASE ORAL at 06:03

## 2019-03-06 RX ADMIN — MIDAZOLAM 2 MG: 1 INJECTION INTRAMUSCULAR; INTRAVENOUS at 07:03

## 2019-03-06 RX ADMIN — SODIUM CHLORIDE: 0.9 INJECTION, SOLUTION INTRAVENOUS at 12:03

## 2019-03-06 RX ADMIN — INSULIN ASPART 2 UNITS: 100 INJECTION, SOLUTION INTRAVENOUS; SUBCUTANEOUS at 10:03

## 2019-03-06 RX ADMIN — ACETAMINOPHEN 1000 MG: 500 TABLET ORAL at 07:03

## 2019-03-06 RX ADMIN — METFORMIN HYDROCHLORIDE 1000 MG: 500 TABLET ORAL at 04:03

## 2019-03-06 RX ADMIN — BUPIVACAINE HYDROCHLORIDE 1.6 ML: 7.5 INJECTION, SOLUTION EPIDURAL; RETROBULBAR at 07:03

## 2019-03-06 RX ADMIN — PREGABALIN 150 MG: 75 CAPSULE ORAL at 06:03

## 2019-03-06 RX ADMIN — PROMETHAZINE HYDROCHLORIDE 6.25 MG: 25 INJECTION INTRAMUSCULAR; INTRAVENOUS at 09:03

## 2019-03-06 RX ADMIN — OXYCODONE HYDROCHLORIDE 5 MG: 5 TABLET ORAL at 10:03

## 2019-03-06 NOTE — PLAN OF CARE
VN cued into pt's room for introduction. VN informed pt and  that VN would be working along side bedside nurse and PCT throughout shift. Level of present pain assessed. At present no distress noted. Discussed with patient and  today's plan of care. Discussed with patient High fall risk protocol and interventions that have been initiated and cont be in place for safety. Patient verbalized clear understanding and cooperation using teach back method. Bed alarm presently activated and in use. Will cont to be available to patient and intervene prn.

## 2019-03-06 NOTE — BRIEF OP NOTE
[facility]  Brief Operative Note    SUMMARY     Surgery Date: 3/6/2019     Surgeon(s) and Role:     * Pj Gamboa MD - Primary    Assisting Surgeon: None    Pre-op Diagnosis:  Primary osteoarthritis of right knee [M17.11]    Post-op Diagnosis:  Post-Op Diagnosis Codes:     * Primary osteoarthritis of right knee [M17.11]    Procedure(s) (LRB):  ARTHROPLASTY, KNEE (Right)    Anesthesia: Choice    Description of Procedure: right total knee replacement     Description of the findings of the procedure: OA right knee    Estimated Blood Loss: 25 mL         Specimens:   Specimen (12h ago, onward)    None

## 2019-03-06 NOTE — PLAN OF CARE
Problem: Physical Therapy Goal  Goal: Physical Therapy Goal  Goals to be met by: 19     Patient will increase functional independence with mobility by performin. Supine <> sit with Stand-by Assistance  2. Sit to stand transfer with Stand-by Assistance  3. Bed to chair transfer with Stand-by Assistance using Rolling Walker  4. Gait  x 100 feet with Stand-by Assistance using Rolling Walker.   5. Ascend/descend 5 stairs with right Handrails Contact Guard Assistance   6. Lower extremity exercise program x 10 reps per handout, with supervision  Outcome: Ongoing (interventions implemented as appropriate)  PT evaluation completed, note to follow. Pt and pt's  educated on RLE positioning to encourage knee extension, use of ice pack for pain management, LE exercises, and gait training. Recommending HH PT/OT upon d/c, no additional DME needs.

## 2019-03-06 NOTE — PLAN OF CARE
Problem: Occupational Therapy Goal  Goal: Occupational Therapy Goal  Goals to be met by: 4/6/19     Patient will increase functional independence with ADLs by performing:    LE Dressing with Supervision.  Grooming while standing with Supervision.  Toileting from toilet with Supervision for hygiene and clothing management.   Supine to sit with Supervision.  Step transfer with Supervision  Toilet transfer to toilet with Supervision.    Outcome: Ongoing (interventions implemented as appropriate)  Pt would benefit from cont OT services in order to maximize functional independence. Recommending HHOT/PT at d/c.

## 2019-03-06 NOTE — NURSING
Patient is an admit from PACU.  Patient is groggy but easily arousable.   at bedside.  Right knee is intact with ace wrap with on Q-ball in place.  IVF infusing.  On O2 at 2L.  Safety is maintained with bed low, wheels locked and side rails up.  Call light within reach.  Instructed to call for any questions or assistance and patient verbalized understanding.  Will continue to monitor.

## 2019-03-06 NOTE — TRANSFER OF CARE
"Anesthesia Transfer of Care Note    Patient: Vivienne Jose    Procedure(s) Performed: Procedure(s) (LRB):  ARTHROPLASTY, KNEE (Right)    Patient location: PACU    Anesthesia Type: spinal and MAC    Transport from OR: Transported from OR on 6-10 L/min O2 by face mask with adequate spontaneous ventilation    Post pain: adequate analgesia    Post assessment: no apparent anesthetic complications and tolerated procedure well    Post vital signs: stable    Level of consciousness: awake, alert and oriented    Nausea/Vomiting: no nausea/vomiting    Complications: none    Transfer of care protocol was followed      Last vitals:   Visit Vitals  BP (!) 124/59   Pulse 78   Temp 36.2 °C (97.2 °F)   Resp 16   Ht 5' 2" (1.575 m)   Wt 106.6 kg (235 lb)   LMP  (LMP Unknown)   SpO2 95%   Breastfeeding? No   BMI 42.98 kg/m²     "

## 2019-03-06 NOTE — ANESTHESIA POSTPROCEDURE EVALUATION
"Anesthesia Post Evaluation    Patient: Vivienne Jose    Procedure(s) Performed: Procedure(s) (LRB):  ARTHROPLASTY, KNEE (Right)    Final Anesthesia Type: spinal  Patient location during evaluation: PACU  Patient participation: Yes- Able to Participate  Level of consciousness: awake and alert  Post-procedure vital signs: reviewed and stable  Pain management: adequate  Airway patency: patent  PONV status at discharge: No PONV  Anesthetic complications: no      Cardiovascular status: blood pressure returned to baseline and hemodynamically stable  Respiratory status: unassisted  Hydration status: euvolemic  Follow-up not needed.        Visit Vitals  /60   Pulse 83   Temp 35.6 °C (96 °F)   Resp 17   Ht 5' 2" (1.575 m)   Wt 107 kg (235 lb 14.3 oz)   LMP  (LMP Unknown)   SpO2 (!) 94%   Breastfeeding? No   BMI 43.15 kg/m²       Pain/Gordo Score: Pain Rating Prior to Med Admin: 0 (3/6/2019 12:31 PM)  Gordo Score: 8 (3/6/2019 10:15 AM)        "

## 2019-03-06 NOTE — ANESTHESIA PROCEDURE NOTES
Spinal    Diagnosis: Right knee TKA  Patient location during procedure: OR  Start time: 3/6/2019 7:46 AM  Timeout: 3/6/2019 7:45 AM  End time: 3/6/2019 7:08 AM  Staffing  Anesthesiologist: Olayinka Bowles MD  Resident/CRNA: Luis Fernando Rivas MD  Performed: anesthesiologist and resident/CRNA   Preanesthetic Checklist  Completed: patient identified, site marked, surgical consent, pre-op evaluation, timeout performed, IV checked, risks and benefits discussed and monitors and equipment checked  Spinal Block  Patient position: sitting  Prep: ChloraPrep  Patient monitoring: heart rate, cardiac monitor, continuous pulse ox and frequent blood pressure checks  Approach: midline  Location: L3-4  Injection technique: single shot  CSF Fluid: clear free-flowing CSF  Needle  Needle type: pencil-tip   Needle gauge: 25 G  Needle length: 3.5 in  Additional Documentation: incremental injection and negative aspiration for heme  Needle localization: anatomical landmarks  Assessment  Sensory level: T10   Dermatomal levels determined by pinch or prick  Ease of block: easy  Patient's tolerance of the procedure: comfortable throughout block and no complaints

## 2019-03-06 NOTE — PLAN OF CARE
On-Q Ropivacaine pain ball CONNECTED TO RIGHT KNEE ABDUCTAL CANAL catheter per Dr Bowles.1025- Signed out from pacu per Dr Bowles. Report called to [facility]/5A. Transported to room 511 per stretcher,sr upx2; easily aroused to verbals & touch stimuli.

## 2019-03-06 NOTE — PLAN OF CARE
Lab called back and stated they have stat labs in other places and will be here when they are done those.

## 2019-03-06 NOTE — PT/OT/SLP EVAL
Occupational Therapy   Evaluation    Name: Vivienne Jose  MRN: [id number]  Admitting Diagnosis:  Primary osteoarthritis of right knee Day of Surgery    Recommendations:     Discharge Recommendations: (HHOT/PT )  Discharge Equipment Recommendations:  none  Barriers to discharge:  None    Assessment:     Vivienne Jose is a 68 y.o. female with a medical diagnosis of Primary osteoarthritis of right knee.  She presents with s/p R TKA. Performance deficits affecting function: weakness, impaired self care skills, impaired balance, impaired endurance, impaired functional mobilty, gait instability, decreased lower extremity function, orthopedic precautions, decreased ROM, impaired skin, pain, edema.      Pt would benefit from cont OT services in order to maximize functional independence. Recommending HHOT/PT at d/c.     Rehab Prognosis: Good; patient would benefit from acute skilled OT services to address these deficits and reach maximum level of function.       Plan:     Patient to be seen 5 x/week to address the above listed problems via self-care/home management, therapeutic activities, therapeutic exercises  · Plan of Care Expires: 04/06/19  · Plan of Care Reviewed with: patient, spouse    Subjective     Chief Complaint: Pt reporting nausea and slight dizziness during session; reports sleepy from medications; requesting to use the bathroom   Patient/Family Comments/goals: return to PLOF    Occupational Profile:  Living Environment: spouse, SSH, 5 steps to enter, R rail, WIS with built in seat   Previous level of function: independent; PRN use of SPC   Equipment Used at Home:  walker, rolling, cane, straight, bedside commode  Assistance upon Discharge: from spouse     Pain/Comfort:  · Pain Rating 1: (reports only soreness at this time )  · Location - Side 1: Right  · Location - Orientation 1: generalized  · Location 1: knee  · Pain Addressed 1: Pre-medicate for activity, Reposition, Distraction, Cessation of Activity  · Pain  Rating Post-Intervention 1: 0/10    Patients cultural, spiritual, Judaism conflicts given the current situation:      Objective:     Communicated with: nsg prior to session.    General Precautions: Standard, fall   Orthopedic Precautions:RLE weight bearing as tolerated   Braces: N/A     Occupational Performance:    Bed Mobility:    · Patient completed Scooting/Bridging with contact guard assistance  · Patient completed Supine to Sit with minimum assistance  · Patient completed Sit to Supine with minimum assistance    Functional Mobility/Transfers:  · Patient completed Sit <> Stand Transfer with contact guard assistance and minimum assistance  with  rolling walker   · Patient completed Toilet Transfer Step Transfer technique with contact guard assistance and minimum assistance with  rolling walker  · Functional Mobility: CGA with RW; cues for adaptive sequence     Activities of Daily Living:  · Lower Body Dressing: total assistance    · Toileting: moderate assistance clothing management and balance     Cognitive/Visual Perceptual:  Cognitive/Psychosocial Skills:     -       Oriented to: Person, Place, Time and Situation   -       Follows Commands/attention:Follows multistep  commands  -       Communication: clear/fluent  -       Memory: No Deficits noted  -       Safety awareness/insight to disability: intact   -       Mood/Affect/Coping skills/emotional control: Appropriate to situation    Physical Exam:  Balance:    -       WFL sitting balance; fair standing 2/2 grogginess from medications   Postural examination/scapula alignment:    -       Rounded shoulders  Skin integrity: Wound surgical R knee  Edema:  Mild surgical R knee  Dominant hand:    -       right  Upper Extremity Range of Motion:   BUE ROM WFL   Upper Extremity Strength:  BUE strength WFL based on function during session     AMPAC 6 Click ADL:  AMPAC Total Score: 17    Treatment & Education:  Pt performing skills as listed above  Pt re-educated on  impt of elevating RLE with correct placement of prop to encourage knee ext  Educated on use of ice pack/restricition/precautions   Functional mobility performed in room to bathroom<-->bed with adaptive sequence following therapist demonstration   Toileting mod A for clothing management and balance   Educated on BLE ROM while supine   Education:    Patient left supine with all lines intact, call button in reach, bed alarm on and nsg notified    GOALS:   Multidisciplinary Problems     Occupational Therapy Goals        Problem: Occupational Therapy Goal    Goal Priority Disciplines Outcome Interventions   Occupational Therapy Goal     OT, PT/OT Ongoing (interventions implemented as appropriate)    Description:  Goals to be met by: 4/6/19     Patient will increase functional independence with ADLs by performing:    LE Dressing with Supervision.  Grooming while standing with Supervision.  Toileting from toilet with Supervision for hygiene and clothing management.   Supine to sit with Supervision.  Step transfer with Supervision  Toilet transfer to toilet with Supervision.                      History:     Past Medical History:   Diagnosis Date    Allergy     Angio-edema     Arthritis     Cataract     Cerebrovascular malformation     Colon polyps     Coronary artery disease     Diabetes mellitus, type 2     Diabetic peripheral neuropathy     GERD (gastroesophageal reflux disease)     Herpes infection     Hyperlipidemia     Hypertension     Hypothyroidism     Kidney stones     Nausea & vomiting 11/23/2015    Obesity, morbid     Ovarian cyst     Pyelonephritis, chronic     Seizure disorder, focal motor     Sleep apnea     Type II or unspecified type diabetes mellitus with neurological manifestations, uncontrolled(250.62)     Urinary tract infection     Urticaria     Vaginal infection        Past Surgical History:   Procedure Laterality Date    CARPAL TUNNEL RELEASE Right 2014    COLONOSCOPY       COLONOSCOPY N/A 8/29/2014    [facility]    COLONOSCOPY Golytely N/A 9/13/2017    [facility]    CYST REMOVAL      skin; multiples    CYSTOSCOPY WITH STENT PLACEMENT Left 12/28/2016    [facility] OR    ENDOSCOPIC-RELEASE-CARPAL TUNNEL right Right 7/8/2014    [facility]    EXTRACORPOREAL SHOCK WAVE LITHOTRIPSY  2002    EXTRACTION-STONE-URETEROSCOPY Left 12/28/2016    [facility] OR    HYSTERECTOMY  1984    KIDNEY STONE SURGERY      LITHOTRIPSY-LASER Left 12/28/2016    [facility] OR    RELEASE-FINGER-TRIGGER right long finger Right 7/8/2014    [facility]    THYROIDECTOMY  1977         TONSILLECTOMY, ADENOIDECTOMY      TRIGGER FINGER RELEASE      TUBAL LIGATION         Time Tracking:     OT Date of Treatment: 03/06/19  OT Start Time: 1321  OT Stop Time: 1350  OT Total Time (min): 29 min    Billable Minutes:Evaluation 15 co treatment with PT     iLss Devlin, OT  3/6/2019

## 2019-03-06 NOTE — ANESTHESIA PROCEDURE NOTES
Peripheral Block    Patient location during procedure: pre-op   Block not for primary anesthetic.  Reason for block: at surgeon's request and post-op pain management   Post-op Pain Location: Right knee  Start time: 3/6/2019 9:18 AM  Timeout: 3/6/2019 9:18 AM   End time: 3/6/2019 9:20 AM  Staffing  Anesthesiologist: Olayinka Bowles MD  Resident/CRNA: Luis Fernando Rivas MD  Performed: anesthesiologist and resident/CRNA   Preanesthetic Checklist  Completed: patient identified, site marked, surgical consent, pre-op evaluation, timeout performed, IV checked, risks and benefits discussed and monitors and equipment checked  Peripheral Block  Patient position: supine  Prep: ChloraPrep and site prepped and draped  Patient monitoring: heart rate, cardiac monitor, continuous pulse ox, continuous capnometry and frequent blood pressure checks  Block type: adductor canal  Laterality: right  Injection technique: continuous  Needle  Needle type: Tuohy   Needle gauge: 17 G  Needle length: 3.5 in  Needle localization: anatomical landmarks and ultrasound guidance  Catheter type: spring wound  Catheter size: 19 G  Test dose: lidocaine 1.5% with Epi 1-to-200,000 and negative   -ultrasound image captured on disc.  Assessment  Injection assessment: negative aspiration, negative parasthesia and local visualized surrounding nerve  Paresthesia pain: none  Heart rate change: no  Slow fractionated injection: yes  Additional Notes  VSS.  [facility] RN monitoring vitals throughout procedure.  Patient tolerated procedure well.  Ropivacaine 0.2% 20 mL used for nerve block.

## 2019-03-06 NOTE — OP NOTE
INDICATION/PREOPERATIVE DIAGNOSIS: Osteoarthritis of the rightknee.    POSTOPERATIVE DIAGNOSIS: Same.    DATE OF SURGERY: 3/6/2019    NAME OF PROCEDURE:right total knee replacement.    SURGEON: Pj Gamboa MD    ASSISTANT: MARCY Ham    IMPLANT SYSTEM: Depuy PFC Sigma PS     EBL: trace    DESCRIPTION OF OPERATION: The patient was taken to the Operating Room. spinal anesthesia was administered and preoperative antibiotics were given. A tourniquet was placed on the proximal thigh. The lower extremity was prepped and draped in the usual sterile fashion. It was exsanguinated with an Esmarch and the tourniquet was inflated to 300 mmHg. An anterior incision was made. Sharp dissection was carried down to the extensor mechanism. A medial parapatellar arthrotomy was performed. The proximal medial face of the tibia was exposed. The patella was everted. The knee was carefully flexed. The remnants of the anterior cruciate ligament and the patellofemoral ligament were released. The fat pad was resected with the anterior part of the lateral meniscus. Osteophytes around the femur were debrided. The femoral canal was entered with the drill. The alignment guide was inserted. The distal jig was applied in 7 degrees of valgus and the distal cut was made. The distal femur was measured as a size 2.5. The combination cutting block was applied in 3 degrees of external rotation. Anterior, posterior and chamfer cuts were made. The center box was then cut at the same size. The meniscal remnants were resected. The proximal tibia was exposed. The surrounding soft tissues were protected with retractors. The external jig was applied in neutral alignment and a neutral cut was made. This cut was checked and found to be satisfactory. A lamina  was inserted with the knee in flexion. The posterior compartment was debrided of osteophytes and synovitis. The tibia was then drilled and punched with the size 2.5 base plate in the appropriate  rotational position, aligned with the tibial tubercle. Trial implants were inserted. There was excellent range of motion and stability with a size 12.5 mm spacer. The patella was then exposed. It was resected using the guide leaving 15 mm of residual bone. It was then drilled for the size 32 mm button. The trial had central tracking. The trials were all removed. The knee was irrigated and dried. Antibiotic cement was mixed. The implants were cemented with the knee held in extension and the patella clamped. After the cement had dried a careful search for extra cement was made and all was removed. The knee was irrigated. The final spacer was snapped into place. The extensor mechanism was closed with interrupted #1 Vicryl. This was checked in flexion and found to be secure. The joint was injected with 1g of Transexemic acid. The subcutaneous tissue was closed with 2-0 Vicryl. The skin was closed with clips and an occlusive plastic dressing with an Ace wrap was applied. The tourniquet was released. There were no known complications. I was present for the entire procedure.

## 2019-03-06 NOTE — PT/OT/SLP EVAL
"Physical Therapy Evaluation and Treatment    Patient Name:  Vivienne Jose   MRN:  [id number]    Recommendations:     Discharge Recommendations:  ( PT/OT)   Discharge Equipment Recommendations: none   Barriers to discharge: None    Assessment:     Vivienne Jose is a 68 y.o. female admitted with a medical diagnosis of Primary osteoarthritis of right knee.  She presents with the following impairments/functional limitations:  weakness, impaired endurance, impaired self care skills, impaired functional mobilty, gait instability, impaired balance, decreased lower extremity function, pain, decreased ROM, edema, impaired skin. Pt and pt's  educated on RLE positioning to encourage knee extension, use of ice pack for pain management, LE exercises, and gait training. Recommending HH PT/OT upon d/c, no additional DME needs.    Rehab Prognosis: Good; patient would benefit from acute skilled PT services to address these deficits and reach maximum level of function.    Recent Surgery: Procedure(s) (LRB):  ARTHROPLASTY, KNEE (Right) Day of Surgery    Plan:     During this hospitalization, patient to be seen BID to address the identified rehab impairments via gait training, therapeutic activities, therapeutic exercises and progress toward the following goals:    · Plan of Care Expires:  04/06/19    Subjective     Chief Complaint: feeling "groggy"   Patient/Family Comments/goals: "I'm surprised it wasn't so bad today"  Pain/Comfort:  · Pain Rating 1: 0/10(reports no pain but that R knee feels "sore")  · Location - Side 1: Right  · Location - Orientation 1: generalized  · Location 1: knee  · Pain Addressed 1: Pre-medicate for activity, Reposition, Distraction, Cessation of Activity  · Pain Rating Post-Intervention 1: 0/10    Patients cultural, spiritual, Latter day conflicts given the current situation: no    Living Environment:  Pt lives with her  and mother in a [facility] with 5 SHALINI with R railing and WIS with built in seat " "and grab bars.  Prior to admission, patients level of function was mod I with intermittent use of SPC pending R knee pain and did not require assist for ADLs.  Equipment used at home: cane, straight, walker, rolling, bedside commode, CPAP(built in shower seat).  DME owned (not currently used): rolling walker and bedside commode.  Upon discharge, patient will have assistance from her .    Objective:     Communicated with ZENAIDA Koenig prior to session.  Patient found all lines intact oxygen(Q-ball)  upon PT entry to room.    General Precautions: Standard, fall   Orthopedic Precautions:RLE weight bearing as tolerated   Braces: N/A     Exams:  · Postural Exam:  Patient presented with the following abnormalities:    · -       No postural abnormalities identified  · Sensation:    · -       Intact  · Skin Integrity/Edema:      · -       Skin integrity: surgical incision R knee - ACE wrapped  · RLE ROM: WFL except knee AROM ~10-70 deg (posterior leaning in sitting to extend knee)  · RLE Strength: WFL except knee and hip 3-/5 (resistance not applied)  · LLE ROM: WFL  · LLE Strength: WFL    Functional Mobility:  · Bed Mobility:     · Scooting: stand by assistance  · Supine to Sit: minimum assistance  · Sit to Supine: minimum assistance  · Transfers:     · Sit to Stand:  contact guard assistance with rolling walker  · Toilet Transfer: contact guard assistance with  rolling walker  using  Step Transfer  · Gait: 10 ft x 2 with RW and CGA - cues for RW management, UE WB to off-weight RLE, and for upright posture.      Therapeutic Activities and Exercises:  Pt and pt's  educated on RLE positioning to encourage knee extension, use of ice pack for pain management, LE exercises, and gait training.   Pt reports feeling "woozy" and tired from anesthesia and pain medication and required slightly increased time upon sitting prior to standing.  Ambulated to/from restroom then returned to supine.  Foot of bed locked into " extension and educated pt on supine LE exercises.  Provided pt with ice pack and educated on on:off time.    AM-PAC 6 CLICK MOBILITY  Total Score:17     Patient left HOB elevated with all lines intact, call button in reach, bed alarm on, RN notified and pt's  present.    GOALS:   Multidisciplinary Problems     Physical Therapy Goals        Problem: Physical Therapy Goal    Goal Priority Disciplines Outcome Goal Variances Interventions   Physical Therapy Goal     PT, PT/OT Ongoing (interventions implemented as appropriate)     Description:  Goals to be met by: 19     Patient will increase functional independence with mobility by performin. Supine <> sit with Stand-by Assistance  2. Sit to stand transfer with Stand-by Assistance  3. Bed to chair transfer with Stand-by Assistance using Rolling Walker  4. Gait  x 100 feet with Stand-by Assistance using Rolling Walker.   5. Ascend/descend 5 stairs with right Handrails Contact Guard Assistance   6. Lower extremity exercise program x 10 reps per handout, with supervision                    History:     Past Medical History:   Diagnosis Date    Allergy     Angio-edema     Arthritis     Cataract     Cerebrovascular malformation     Colon polyps     Coronary artery disease     Diabetes mellitus, type 2     Diabetic peripheral neuropathy     GERD (gastroesophageal reflux disease)     Herpes infection     Hyperlipidemia     Hypertension     Hypothyroidism     Kidney stones     Nausea & vomiting 2015    Obesity, morbid     Ovarian cyst     Pyelonephritis, chronic     Seizure disorder, focal motor     Sleep apnea     Type II or unspecified type diabetes mellitus with neurological manifestations, uncontrolled(250.62)     Urinary tract infection     Urticaria     Vaginal infection        Past Surgical History:   Procedure Laterality Date    CARPAL TUNNEL RELEASE Right     COLONOSCOPY      COLONOSCOPY N/A 2014     [facility] at [facility]    COLONOSCOPY Golytely N/A 9/13/2017    [facility] at [facility] END[facility]    CYST REMOVAL      skin; multiples    CYSTOSCOPY WITH STENT PLACEMENT Left 12/28/2016    [facility] at [facility] OR    ENDOSCOPIC-RELEASE-CARPAL TUNNEL right Right 7/8/2014    [facility]    EXTRACORPOREAL SHOCK WAVE LITHOTRIPSY  2002    EXTRACTION-STONE-URETEROSCOPY Left 12/28/2016    [facility] at [facility] OR    HYSTERECTOMY  1984    KIDNEY STONE SURGERY      LITHOTRIPSY-LASER Left 12/28/2016    [facility] at [facility] OR    RELEASE-FINGER-TRIGGER right long finger Right 7/8/2014    [facility] at [facility]    THYROIDECTOMY  1977         TONSILLECTOMY, ADENOIDECTOMY      TRIGGER FINGER RELEASE      TUBAL LIGATION         Time Tracking:     PT Received On: 03/06/19  PT Start Time: 1321     PT Stop Time: 1350  PT Total Time (min): 29 min with OT    Billable Minutes: Evaluation 20 and Gait Training 9      Anamika Marin, PT  03/06/2019

## 2019-03-06 NOTE — ANESTHESIA PROCEDURE NOTES
Peripheral Block    Patient location during procedure: post-op   Block not for primary anesthetic.  Reason for block: at surgeon's request and post-op pain management   Post-op Pain Location: Right knee  Start time: 3/6/2019 9:18 AM  Timeout: 3/6/2019 9:18 AM   End time: 3/6/2019 9:20 AM  Staffing  Anesthesiologist: Olayinka Bowles MD  Resident/CRNA: Luis Fernando Rivas MD  Performed: anesthesiologist and resident/CRNA   Preanesthetic Checklist  Completed: patient identified, site marked, surgical consent, pre-op evaluation, timeout performed, IV checked, risks and benefits discussed and monitors and equipment checked  Peripheral Block  Patient position: supine  Prep: ChloraPrep and site prepped and draped  Patient monitoring: heart rate, cardiac monitor, continuous pulse ox, continuous capnometry and frequent blood pressure checks  Block type: adductor canal  Laterality: right  Injection technique: continuous  Needle  Needle type: Tuohy   Needle gauge: 17 G  Needle length: 3.5 in  Needle localization: anatomical landmarks and ultrasound guidance  Catheter type: spring wound  Catheter size: 19 G  Test dose: lidocaine 1.5% with Epi 1-to-200,000 and negative   -ultrasound image captured on disc.  Assessment  Injection assessment: negative aspiration, negative parasthesia and local visualized surrounding nerve  Paresthesia pain: none  Heart rate change: no  Slow fractionated injection: yes  Additional Notes  VSS.  [facility] RN monitoring vitals throughout procedure.  Patient tolerated procedure well.  Ropivacaine 0.2% 20 mL used for nerve block.

## 2019-03-06 NOTE — OP NOTE
Certification of Assistant at Surgery       Surgery Date: 3/6/2019     Participating Surgeons:  Surgeon(s) and Role:     * Pj Gamboa MD - Primary    Procedures:  Procedure(s) (LRB):  ARTHROPLASTY, KNEE (Right)    Assistant Surgeon's Certification of Necessity:  I understand that section 1842 (b) (6) (d) of the Social Security Act generally prohibits Medicare Part B reasonable charge payment for the services of assistants at surgery in teaching hospitals when qualified residents are available to furnish such services. I certify that the services for which payment is claimed were medically necessary, and that no qualified resident was available to perform the services. I further understand that these services are subject to post-payment review by the Medicare carrier.  **    Rosa Ham PA-C    03/06/2019  9:12 AM

## 2019-03-07 VITALS
TEMPERATURE: 98 F | DIASTOLIC BLOOD PRESSURE: 61 MMHG | HEART RATE: 77 BPM | RESPIRATION RATE: 18 BRPM | WEIGHT: 236.56 LBS | OXYGEN SATURATION: 93 % | HEIGHT: 62 IN | BODY MASS INDEX: 43.53 KG/M2 | SYSTOLIC BLOOD PRESSURE: 142 MMHG

## 2019-03-07 PROBLEM — Z96.651 S/P TOTAL KNEE ARTHROPLASTY, RIGHT: Status: ACTIVE | Noted: 2019-03-07

## 2019-03-07 PROBLEM — M17.11 PRIMARY OSTEOARTHRITIS OF RIGHT KNEE: Status: RESOLVED | Noted: 2019-03-06 | Resolved: 2019-03-07

## 2019-03-07 LAB
ANION GAP SERPL CALC-SCNC: 8 MMOL/L
BUN SERPL-MCNC: 17 MG/DL
CALCIUM SERPL-MCNC: 8.4 MG/DL
CHLORIDE SERPL-SCNC: 102 MMOL/L
CO2 SERPL-SCNC: 27 MMOL/L
CREAT SERPL-MCNC: 0.9 MG/DL
EST. GFR  (AFRICAN AMERICAN): >60 ML/MIN/1.73 M^2
EST. GFR  (NON AFRICAN AMERICAN): >60 ML/MIN/1.73 M^2
GLUCOSE SERPL-MCNC: 191 MG/DL
HCT VFR BLD AUTO: 34.3 %
HGB BLD-MCNC: 10.8 G/DL
POCT GLUCOSE: 202 MG/DL (ref 70–110)
POCT GLUCOSE: 231 MG/DL (ref 70–110)
POTASSIUM SERPL-SCNC: 3.1 MMOL/L
SODIUM SERPL-SCNC: 137 MMOL/L

## 2019-03-07 PROCEDURE — 25000003 PHARM REV CODE 250: Mod: HCNC | Performed by: ORTHOPAEDIC SURGERY

## 2019-03-07 PROCEDURE — 85014 HEMATOCRIT: CPT | Mod: HCNC

## 2019-03-07 PROCEDURE — 85018 HEMOGLOBIN: CPT | Mod: HCNC

## 2019-03-07 PROCEDURE — 97116 GAIT TRAINING THERAPY: CPT | Mod: HCNC,59

## 2019-03-07 PROCEDURE — 97110 THERAPEUTIC EXERCISES: CPT | Mod: HCNC

## 2019-03-07 PROCEDURE — 97530 THERAPEUTIC ACTIVITIES: CPT | Mod: HCNC

## 2019-03-07 PROCEDURE — 97535 SELF CARE MNGMENT TRAINING: CPT | Mod: HCNC,59

## 2019-03-07 PROCEDURE — 99900035 HC TECH TIME PER 15 MIN (STAT): Mod: HCNC

## 2019-03-07 PROCEDURE — 25000242 PHARM REV CODE 250 ALT 637 W/ HCPCS: Mod: HCNC | Performed by: ORTHOPAEDIC SURGERY

## 2019-03-07 PROCEDURE — 80048 BASIC METABOLIC PNL TOTAL CA: CPT | Mod: HCNC

## 2019-03-07 PROCEDURE — 63600175 PHARM REV CODE 636 W HCPCS: Mod: HCNC | Performed by: ORTHOPAEDIC SURGERY

## 2019-03-07 PROCEDURE — 94761 N-INVAS EAR/PLS OXIMETRY MLT: CPT | Mod: HCNC

## 2019-03-07 PROCEDURE — 27000221 HC OXYGEN, UP TO 24 HOURS: Mod: HCNC

## 2019-03-07 PROCEDURE — 36415 COLL VENOUS BLD VENIPUNCTURE: CPT | Mod: HCNC

## 2019-03-07 RX ORDER — OXYCODONE AND ACETAMINOPHEN 5; 325 MG/1; MG/1
TABLET ORAL
Qty: 28 TABLET | Refills: 0 | Status: SHIPPED | OUTPATIENT
Start: 2019-03-07 | End: 2019-04-23

## 2019-03-07 RX ORDER — POTASSIUM CHLORIDE 20 MEQ/1
20 TABLET, EXTENDED RELEASE ORAL 2 TIMES DAILY
Status: DISCONTINUED | OUTPATIENT
Start: 2019-03-07 | End: 2019-03-07 | Stop reason: HOSPADM

## 2019-03-07 RX ORDER — OXYCODONE AND ACETAMINOPHEN 5; 325 MG/1; MG/1
1 TABLET ORAL EVERY 6 HOURS PRN
Qty: 40 TABLET | Refills: 0 | Status: SHIPPED | OUTPATIENT
Start: 2019-03-07 | End: 2019-04-23

## 2019-03-07 RX ADMIN — FLUTICASONE PROPIONATE 100 MCG: 50 SPRAY, METERED NASAL at 08:03

## 2019-03-07 RX ADMIN — ACETAMINOPHEN 1000 MG: 500 TABLET ORAL at 03:03

## 2019-03-07 RX ADMIN — ASPIRIN 81 MG: 81 TABLET, COATED ORAL at 08:03

## 2019-03-07 RX ADMIN — ACETAMINOPHEN 1000 MG: 500 TABLET ORAL at 08:03

## 2019-03-07 RX ADMIN — METFORMIN HYDROCHLORIDE 1000 MG: 500 TABLET ORAL at 07:03

## 2019-03-07 RX ADMIN — INSULIN ASPART 4 UNITS: 100 INJECTION, SOLUTION INTRAVENOUS; SUBCUTANEOUS at 11:03

## 2019-03-07 RX ADMIN — OXYCODONE HYDROCHLORIDE 5 MG: 5 TABLET ORAL at 08:03

## 2019-03-07 RX ADMIN — CETIRIZINE HYDROCHLORIDE 10 MG: 10 TABLET, FILM COATED ORAL at 08:03

## 2019-03-07 RX ADMIN — CHLORTHALIDONE 25 MG: 25 TABLET ORAL at 08:03

## 2019-03-07 RX ADMIN — POTASSIUM CHLORIDE 20 MEQ: 20 TABLET, EXTENDED RELEASE ORAL at 08:03

## 2019-03-07 RX ADMIN — FAMOTIDINE 20 MG: 20 TABLET, FILM COATED ORAL at 08:03

## 2019-03-07 RX ADMIN — AZELASTINE HYDROCHLORIDE 274 MCG: 137 SPRAY, METERED NASAL at 08:03

## 2019-03-07 RX ADMIN — LEVOTHYROXINE SODIUM 75 MCG: 75 TABLET ORAL at 05:03

## 2019-03-07 RX ADMIN — STANDARDIZED SENNA CONCENTRATE AND DOCUSATE SODIUM 1 TABLET: 8.6; 5 TABLET, FILM COATED ORAL at 08:03

## 2019-03-07 RX ADMIN — GABAPENTIN 600 MG: 300 CAPSULE ORAL at 08:03

## 2019-03-07 NOTE — PROGRESS NOTES
[facility]  Orthopedics  Progress Note    Patient Name: Vivienne Jose  MRN: [id number]  Admission Date: 3/6/2019  Hospital Length of Stay: 0 days  Attending Provider: Pj Gamboa MD  Primary Care Provider: Jay Jay Felton MD  Follow-up For: Procedure(s) (LRB):  ARTHROPLASTY, KNEE (Right)    Post-Operative Day: 1 Day Post-Op  Subjective:     Principal Problem:Primary osteoarthritis of right knee    Principal Orthopedic Problem:  Same    Interval History:  Patient reports feeling well. Some operative site pain, generally well controlled.    Review of patient's allergies indicates:   Allergen Reactions    Sulfa (sulfonamide antibiotics) Nausea Only    Ciprofloxacin     Januvia [sitagliptin]      abd pain    Lisinopril     Lotensin [benazepril]     Nexium [esomeprazole magnesium] Other (See Comments)     Gas       Current Facility-Administered Medications   Medication    0.9%  NaCl infusion    acetaminophen tablet 1,000 mg    amLODIPine tablet 5 mg    aspirin EC tablet 81 mg    azelastine 137 mcg (0.1 %) nasal spray 274 mcg    bisacodyl suppository 10 mg    cetirizine tablet 10 mg    chlorthalidone tablet 25 mg    dextrose 50% injection 12.5 g    dextrose 50% injection 25 g    famotidine tablet 20 mg    fluticasone 50 mcg/actuation nasal spray 100 mcg    gabapentin capsule 600 mg    glucagon (human recombinant) injection 1 mg    glucose chewable tablet 16 g    glucose chewable tablet 24 g    hydromorphone (PF) injection 0.5 mg    insulin aspart U-100 pen 1-10 Units    lactulose 20 gram/30 mL solution Soln 20 g    levothyroxine tablet 75 mcg    metFORMIN tablet 1,000 mg    ondansetron disintegrating tablet 4 mg    ondansetron injection 4 mg    oxyCODONE immediate release tablet 10 mg    oxyCODONE immediate release tablet 5 mg    polyethylene glycol packet 17 g    potassium chloride SA CR tablet 20 mEq    pravastatin tablet 40 mg    ramelteon tablet 8 mg     "Ropivacaine 0.2% On-Q C-Bloc 550 ml     senna-docusate 8.6-50 mg per tablet 1 tablet    sodium chloride 0.9% flush 5 mL     Objective:     Vital Signs (Most Recent):  Temp: 98.3 °F (36.8 °C) (03/07/19 0813)  Pulse: 74 (03/07/19 0813)  Resp: 18 (03/07/19 0813)  BP: (!) 114/56 (03/07/19 0813)  SpO2: (!) 92 % (03/07/19 0322) Vital Signs (24h Range):  Temp:  [96 °F (35.6 °C)-98.3 °F (36.8 °C)] 98.3 °F (36.8 °C)  Pulse:  [69-84] 74  Resp:  [16-18] 18  SpO2:  [91 %-95 %] 92 %  BP: ()/(48-62) 114/56     Weight: 107.3 kg (236 lb 8.9 oz)  Height: 5' 2" (157.5 cm)  Body mass index is 43.27 kg/m².      Intake/Output Summary (Last 24 hours) at 3/7/2019 0819  Last data filed at 3/7/2019 0528  Gross per 24 hour   Intake 2422.75 ml   Output 425 ml   Net 1997.75 ml       Ortho/SPM Exam     Appears comfortable  Dressing with minimal blood spotting  Operated extremity neurovascular intact  Slightly low potassium noted    Significant Labs: All pertinent labs within the past 24 hours have been reviewed.    Significant Imaging: None    Assessment/Plan:     Active Diagnoses:    Diagnosis Date Noted POA    PRINCIPAL PROBLEM:  Primary osteoarthritis of right knee [M17.11] 03/06/2019 Yes      Problems Resolved During this Admission:     Normal postop day 1  Will increase potassium dosage while in hospital  Saline lock IV  Physical therapy  Discharge home when gait is stable    Pj Gamboa MD  Orthopedics  [facility]  "

## 2019-03-07 NOTE — DISCHARGE SUMMARY
[facility]  Orthopedics  Discharge Summary      Patient Name: Vivienne Jose  MRN: [id number]  Admission Date: 3/6/2019  Hospital Length of Stay: 0 days  Discharge Date and Time:  03/07/2019 1:06 PM  Attending Physician: Pj Gamboa MD   Discharging Provider: Rosa Ham PA-C  Primary Care Provider: Jay Jay Felton MD    HPI: Vivienne Jose is a 68 y.o. female with PMH significant for HTN , CAD, HLD, DM 2, BAM, and GERD.  She was admitted for a Right total knee arthroplasty to be performed by  Dr. Gamboa on 03/06/19.      Procedure(s) (LRB):  ARTHROPLASTY, KNEE (Right)      Hospital Course: The patient underwent surgery on the day of admission 03/06/19. The procedure was uneventful and the patient tolerated well. Post operatively the patient was hemodynamically stable and made appropriate progress in therapy. There were no evident acute postoperative complications.      Significant Diagnostic Studies: Labs:   BMP:   Recent Labs   Lab 03/07/19  0446   *      K 3.1*      CO2 27   BUN 17   CREATININE 0.9   CALCIUM 8.4*    and CMP   Recent Labs   Lab 03/07/19  0446      K 3.1*      CO2 27   *   BUN 17   CREATININE 0.9   CALCIUM 8.4*   ANIONGAP 8   ESTGFRAFRICA >60   EGFRNONAA >60       Pending Diagnostic Studies:     None        Final Active Diagnoses:    Diagnosis Date Noted POA    PRINCIPAL PROBLEM:  S/P total knee arthroplasty, right [Z96.651] 03/07/2019 Not Applicable      Problems Resolved During this Admission:    Diagnosis Date Noted Date Resolved POA    Primary osteoarthritis of right knee [M17.11] 03/06/2019 03/07/2019 Yes      Discharged Condition: fair    Disposition: Home or Self Care    Follow Up:  Follow-up Information     Pj Gamboa MD On 3/21/2019.    Specialty:  Orthopedic Surgery  Why:  Appt at 130 pm   Contact information:  Rubio Duran  [address] [address] [phone]                 Patient Instructions:      Diet  diabetic     Notify your health care provider if you experience any of the following:  severe uncontrolled pain     Notify your health care provider if you experience any of the following:  redness, tenderness, or signs of infection (pain, swelling, redness, odor or green/yellow discharge around incision site)     Notify your health care provider if you experience any of the following:  difficulty breathing or increased cough     Leave dressing on - Keep it clean, dry, and intact until clinic visit     Activity as tolerated     Medications:  Reconciled Home Medications:      Medication List      START taking these medications    * oxyCODONE-acetaminophen 5-325 mg per tablet  Commonly known as:  PERCOCET  Fill this prescription 1st. Take 1 tablet by mouth every 4 hr as needed for pain     * oxyCODONE-acetaminophen 5-325 mg per tablet  Commonly known as:  PERCOCET  Take 1 tablet by mouth every 6 (six) hours as needed for Pain. Fill this prescription if needed after your 1st 1 is done    Note pharmacy:  Recent major surgery-please allow early refill if patient requests         * This list has 2 medication(s) that are the same as other medications prescribed for you. Read the directions carefully, and ask your doctor or other care provider to review them with you.            CONTINUE taking these medications    amLODIPine 5 MG tablet  Commonly known as:  NORVASC  Take 1 tablet (5 mg total) by mouth once daily.     aspirin 81 MG EC tablet  Commonly known as:  ECOTRIN  Take 81 mg by mouth once daily.     azelastine 137 mcg (0.1 %) nasal spray  Commonly known as:  ASTELIN  2 sprays (274 mcg total) by Nasal route 2 (two) times daily.     biotin 10,000 mcg Cap  Take by mouth.     blood sugar diagnostic Strp  Commonly known as:  TRUE METRIX GLUCOSE TEST STRIP  TEST TWO TIMES DAILY     blood-glucose meter Misc  [employer] True Metrix Air meter     calcium carbonate-vitamin D3 600 mg(1,500mg) -100 unit Cap  Take 1 tablet by mouth  once daily.     chlorthalidone 25 MG Tab  Commonly known as:  HYGROTEN  Take 1 tablet (25 mg total) by mouth once daily.     diclofenac sodium 1 % Gel  Commonly known as:  VOLTAREN  Apply 2 g topically 4 (four) times daily.     econazole nitrate 1 % cream  Mix with hydrocortisone cream 1% 30 grams use bid prn     fluticasone 50 mcg/actuation nasal spray  Commonly known as:  FLONASE  2 sprays (100 mcg total) by Each Nare route once daily.     gabapentin 300 MG capsule  Commonly known as:  NEURONTIN  Take 2 capsules (600 mg total) by mouth 2 (two) times daily.     glucagon (human recombinant) 1 mg Kit  Commonly known as:  GLUCAGON EMERGENCY KIT (HUMAN)  Inject 1 mL (1 mg total) into the muscle as needed.     insulin degludec 100 unit/mL (3 mL) Inpn  Commonly known as:  TRESIBA FLEXTOUCH U-100  Inject 22 Units into the skin every evening.     ketotifen 0.025 % (0.035 %) ophthalmic solution  Commonly known as:  ZADITOR  Place 1-2 drops into both eyes once daily.     lancets 28 gauge Misc  Commonly known as:  TRUEPLUS LANCETS  Inject 1 lancet into the skin 2 (two) times daily before meals.     levothyroxine 75 MCG tablet  Commonly known as:  SYNTHROID  Take 1 tablet (75 mcg total) by mouth once daily.     liraglutide 0.6 mg/0.1 mL (18 mg/3 mL) subq PNIJ 0.6 mg/0.1 mL (18 mg/3 mL) Pnij  Commonly known as:  VICTOZA 3-TAWANA  INJECT 1.8 MG INTO THE SKIN ONCE DAILY.     loratadine 10 mg tablet  Commonly known as:  CLARITIN  Take 10 mg by mouth once daily.     lovastatin 40 MG tablet  Commonly known as:  MEVACOR  TAKE 1 TABLET NIGHTLY (SUBSTITUTED FOR MEVACOR)     metFORMIN 1000 MG tablet  Commonly known as:  GLUCOPHAGE  Take 1 tablet (1,000 mg total) by mouth 2 (two) times daily with meals.     MG-PLUS-PROTEIN 133 mg Tab  Generic drug:  magnesium oxide-Mg AA chelate  Take by mouth as directed.     nitrofurantoin (macrocrystal-monohydrate) 100 MG capsule  Commonly known as:  MACROBID  Take 1 capsule (100 mg total) by mouth  "nightly.     omeprazole 20 MG capsule  Commonly known as:  PRILOSEC  Take 1 capsule (20 mg total) by mouth daily as needed.     pen needle, diabetic 32 gauge x 5/32" Ndle  Commonly known as:  BD ULTRA-FINE EDUARDO PEN NEEDLE  USE WITH VICTOZA AND LEVEMIR (2 INJECTIONS EVERY DAY)     potassium chloride SA 10 MEQ tablet  Commonly known as:  K-DUR,KLOR-CON  Take 10 meq PO QD except on Mondays, Wednesdays and Fridays, on which she should take 2 tabs (20 meq) QD     PREMARIN vaginal cream  Generic drug:  conjugated estrogens  Place 0.5 g vaginally 3 (three) times a week.     PROBIOTIC COMPLEX 25 billion cell -100 mg Cap  Generic drug:  L.acid-B.bifidum-B.animal-FOS  Take 1 capsule by mouth once daily.     valACYclovir 500 MG tablet  Commonly known as:  VALTREX  TAKE 1 TABLET ONE TIME DAILY     valsartan 160 MG tablet  Commonly known as:  DIOVAN  TAKE 1 TABLET TWICE DAILY            Rosa Ham PA-C  Orthopedics  [facility]  "

## 2019-03-07 NOTE — PLAN OF CARE
Problem: Adult Inpatient Plan of Care  Goal: Plan of Care Review  Outcome: Ongoing (interventions implemented as appropriate)  Pt is AAOx3. No complaints of nausea, vomiting, or diarrhea. Pt complained of minimal pain/primarily discomfort to the right knee and oxycodone given. On q ball in place. IV fluids maintained. Right knee dressing is intact. Ambulated with assist. Diabetic 2000 calorie diet. ACHS and last bs was 217 and insulin given. Safety precautions maintained. Tolerated all medications well.

## 2019-03-07 NOTE — NURSING
VN note: VN cued into patient's room. Patient's  at bedside. VN reviewed discharge medication and side effects with them. VN also educated patient on post-op care as well. Patient educated on signs and symptoms of infection and when to seek medical attention. Follow-up appointments reviewed. Patient awaiting prescription from pharmacy. She verbalized understanding and all questions answered. Refer to clinical references for further education.

## 2019-03-07 NOTE — PLAN OF CARE
Problem: Occupational Therapy Goal  Goal: Occupational Therapy Goal  Goals to be met by: 4/6/19     Patient will increase functional independence with ADLs by performing:    LE Dressing with Supervision.  Grooming while standing with Supervision.  Toileting from toilet with Supervision for hygiene and clothing management.   Supine to sit with Supervision.  Step transfer with Supervision  Toilet transfer to toilet with Supervision.     Outcome: Ongoing (interventions implemented as appropriate)  Vivienne Jose is a 68 y.o. female with a medical diagnosis of Primary osteoarthritis of right knee.  Performance deficits affecting function are weakness, impaired endurance, impaired self care skills, impaired functional mobilty, gait instability, impaired balance, decreased lower extremity function, pain, decreased safety awareness, orthopedic precautions, impaired skin, edema. Pt found in bed, agreeable to therapy.  Pt progressing well towards goals.  Reports pain 5/10 with mobility, otherwise, min pain in RLE. Continue OT services to address functional goals, progressing as able.    MARLEY Jeffries/L

## 2019-03-07 NOTE — PT/OT/SLP PROGRESS
"Physical Therapy Treatment    Patient Name:  Vivienne Jose   MRN:  [id number]    Recommendations:     Discharge Recommendations:  (HHPT/OT)   Discharge Equipment Recommendations: none   Barriers to discharge: None    Assessment:     Vivienne Jose is a 68 y.o. female admitted with a medical diagnosis of S/P total knee arthroplasty, right.  She presents with the following impairments/functional limitations:  weakness, impaired endurance, impaired functional mobilty, gait instability, impaired balance, decreased lower extremity function, pain, decreased ROM, orthopedic precautions.  Pt family training for stairs performed with pt and pt's .  Pt ascended and descended 2-5" steps with one handrail as pt stated she had at home.  Pt's  assisted pt with CGA with PTA performing CGA/Paul and instructing pt and her  on proper technique to ascend/descend steps and proper technique for assistance.  Pt and her  performed this task safety, and  verbalized understanding of proper technique to assist. Pt would continue to benefit from P.T. To TKA protocol.    Rehab Prognosis: Good; patient would benefit from acute skilled PT services to address these deficits and reach maximum level of function.    Recent Surgery: Procedure(s) (LRB):  ARTHROPLASTY, KNEE (Right) 1 Day Post-Op    Plan:     During this hospitalization, patient to be seen BID to address the identified rehab impairments via gait training, therapeutic activities, therapeutic exercises and progress toward the following goals:    · Plan of Care Expires:  04/06/19    Subjective     Chief Complaint: Right thigh pain.  Patient/Family Comments/goals: Pt agreed to tx.  Pain/Comfort:  · Pain Rating 1: 5/10  · Location - Side 1: Right  · Location - Orientation 1: generalized  · Location 1: knee  · Pain Addressed 1: Reposition, Distraction, Nurse notified  · Pain Rating Post-Intervention 1: 5/10      Objective:     Communicated with RN(Lupis) " "prior to session.  Patient found all lines intact, call button in reach, RN notified and pt's  present    upon PT entry to room.     General Precautions: Standard, fall   Orthopedic Precautions:RLE weight bearing as tolerated   Braces:       Functional Mobility:  · Bed Mobility:     · Scooting: stand by assistance to EOB and sitting EOB back into bed.  · Supine to Sit: minimum assistance and RLE with pt in long sit  · Sit to Supine: minimum assistance and RLE with pt in long sit  · Transfers:     · Sit to Stand:  contact guard assistance and minimum assistance with rolling walker x 3 reps  · Gait: 20ft and 10ft with RW and CGA with slow fran and decreased R knee/hip flexion during swing phase.  Pt was in a little more pain in second tx session.  · Balance: sitting good, standing RW fair+, gait RW fair  · Stairs:  Pt ascended/descended 2-5" steps stair(s) and 1 hand rail with No Assistive Device with right handrail with Contact Guard Assistance and Minimal Assistance.       AM-PAC 6 CLICK MOBILITY  Turning over in bed (including adjusting bedclothes, sheets and blankets)?: 3  Sitting down on and standing up from a chair with arms (e.g., wheelchair, bedside commode, etc.): 3  Moving from lying on back to sitting on the side of the bed?: 3  Moving to and from a bed to a chair (including a wheelchair)?: 3  Need to walk in hospital room?: 3  Climbing 3-5 steps with a railing?: 3  Basic Mobility Total Score: 18       Therapeutic Activities and Exercises:   Assisted pt with dressing while pt sat EOB.  Pt required Paul to zach pants, Mod I for shirt, and Max A/Total A for shoes.  Pt transferred to w/c with CGA and RW.  Pt was taken to training steps via w/c and ambulated ~5ft to steps.  Stair training as above in assessment.  Pt then returned to room via w/c and transferred to EOB with Rw and CGA and assisted BTB with Paul for RLE.    Patient left supine with all lines intact, call button in reach, bed alarm on, " RN notified and pt's  present..    GOALS:   Multidisciplinary Problems     Physical Therapy Goals        Problem: Physical Therapy Goal    Goal Priority Disciplines Outcome Goal Variances Interventions   Physical Therapy Goal     PT, PT/OT Ongoing (interventions implemented as appropriate)     Description:  Goals to be met by: 19     Patient will increase functional independence with mobility by performin. Supine <> sit with Stand-by Assistance  2. Sit to stand transfer with Stand-by Assistance  3. Bed to chair transfer with Stand-by Assistance using Rolling Walker  4. Gait  x 100 feet with Stand-by Assistance using Rolling Walker.   5. Ascend/descend 5 stairs with right Handrails Contact Guard Assistance   6. Lower extremity exercise program x 10 reps per handout, with supervision                    Time Tracking:     PT Received On: 19  PT Start Time: 1430     PT Stop Time: 1509  PT Total Time (min): 39 min     Billable Minutes: Therapeutic Activity 39    Treatment Type: Treatment  PT/PTA: PTA     PTA Visit Number: 1     Radha Tello PTA  2019

## 2019-03-07 NOTE — PLAN OF CARE
VN note: VN cued into pt's room for introduction. VN informed pt that VN would be working closely along side bedside nurse, PCT, and the rest of care team and making rounds throughout the shift. Fall risk education provided. Pt verbalized understanding. Allowed time for questions. VN will continue to be available to patient and intervene prn.       03/07/19 1118   Type of Frequent Check   Type Patient Rounds;Other (see comments)  (VN Rounds)   Safety/Activity   Patient Rounds bed in low position;visualized patient   Safety Promotion/Fall Prevention instructed to call staff for mobility;side rails raised x 2   Activity Management up in chair   Assessments (Pre/Post)   Level of Consciousness (AVPU) alert

## 2019-03-07 NOTE — PLAN OF CARE
This  put name on white board and explained blue discharge folder to patient. Discharge planning brochure and/or business card given to patient.  Patient verbalized understanding.    TN met with patient and her . Pt states that prior to arrival she lived with her  and also had her mother move in to care for her. Pt was independent with assistance prior to arrival and able to drive.  states that he will bring pt home on discharge and to all follow up appointments. Pt denies any needs for HH prior to surgery. Pt has all DME required upon discharge. She has no further questions.     Future Appointments   Date Time Provider Department Center   3/21/2019  1:30 PM Pj Gamboa MD [facility] ORTHO [facility]   4/5/2019 10:00 AM [place], ALLERGY INJECTION [facility] IM [place]   4/15/2019 10:00 AM LAB, [facility] [place]   4/15/2019 10:15 AM [place]   4/17/2019 10:00 AM Alisia Sotomayor DO [facility] NEPHRO [address]   5/10/2019  1:30 PM Lyndon Mathews OD [facility] OPTOMTY [place]   5/13/2019  8:15 AM [facility] [place] KEN SPECLAB [place]   5/13/2019  8:30 AM LAB, [facility] [place]   5/17/2019  1:20 PM Jay Jay Felton MD [facility] IM [place]   6/20/2019  3:40 PM Maya Diez MD [facility] CARDIO [facility]   6/26/2019  2:00 PM Juanita Tony MD [facility] SLEEP [place]        03/07/19 1152   Discharge Assessment   Assessment Type Discharge Planning Assessment   Confirmed/corrected address and phone number on facesheet? Yes   Assessment information obtained from? Patient;Caregiver;Medical Record   Expected Length of Stay (days) 1   Communicated expected length of stay with patient/caregiver yes   Prior to hospitilization cognitive status: Alert/Oriented   Prior to hospitalization functional status: Assistive Equipment   Current cognitive status: Alert/Oriented   Current Functional Status: Assistive Equipment   Lives With parent(s);spouse   Able to  Return to Prior Arrangements yes   Is patient able to care for self after discharge? Yes   Who are your caregiver(s) and their phone number(s)? Tucker () [phone]   Patient's perception of discharge disposition home or selfcare;home health   Readmission Within the Last 30 Days no previous admission in last 30 days   Patient currently being followed by outpatient case management? No   Patient currently receives any other outside agency services? No   Equipment Currently Used at Home CPAP;cane, straight;bedside commode;walker, rolling;grab bar;glucometer;shower chair   Do you have any problems affording any of your prescribed medications? No   Is the patient taking medications as prescribed? yes   Does the patient have transportation home? Yes   Transportation Anticipated family or friend will provide   Does the patient receive services at the [facility]? No   Discharge Plan A Home;Home with family;Home Health   DME Needed Upon Discharge  none   Patient/Family in Agreement with Plan yes

## 2019-03-07 NOTE — PT/OT/SLP PROGRESS
Occupational Therapy   Treatment    Name: Vivienne Jose  MRN: [id number]  Admitting Diagnosis:  Primary osteoarthritis of right knee  1 Day Post-Op    Recommendations:     Discharge Recommendations: other (see comments)(HH PT/OT)  Discharge Equipment Recommendations:  none  Barriers to discharge:  None    Assessment:     Vivienne Jose is a 68 y.o. female with a medical diagnosis of Primary osteoarthritis of right knee.  Performance deficits affecting function are weakness, impaired endurance, impaired self care skills, impaired functional mobilty, gait instability, impaired balance, decreased lower extremity function, pain, decreased safety awareness, orthopedic precautions, impaired skin, edema. Pt found in bed, agreeable to therapy.  Pt progressing well towards goals.  Reports pain 5/10 with mobility, otherwise, min pain in RLE. Continue OT services to address functional goals, progressing as able.      Rehab Prognosis:  Good; patient would benefit from acute skilled OT services to address these deficits and reach maximum level of function.       Plan:     Patient to be seen 5 x/week to address the above listed problems via self-care/home management, therapeutic activities, therapeutic exercises  · Plan of Care Expires: 04/06/19  · Plan of Care Reviewed with: patient, spouse    Subjective     Pain/Comfort:  · Pain Rating 1: 5/10(with mobility)  · Location - Side 1: Right  · Location - Orientation 1: generalized  · Location 1: knee  · Pain Addressed 1: Cessation of Activity  · Pain Rating Post-Intervention 1: 1/10(at rest)    Objective:     Communicated with: RN prior to session.  Patient found HOB elevated with peripheral IV upon OT entry to room.    General Precautions: Standard, fall   Orthopedic Precautions:RLE weight bearing as tolerated   Braces: N/A     Occupational Performance:     Bed Mobility:    · Patient completed Scooting/Bridging with supervision  · Patient completed Supine to Sit with supervision      Functional Mobility/Transfers:  · Patient completed Sit <> Stand Transfer with stand by assistance  with  rolling walker and vc's for hand placement   · Patient completed Bed <> Chair Transfer using Stand Pivot technique with stand by assistance with rolling walker  · Functional Mobility: Pt ambulated room distances with SBA using RW with min vc's for sequencing and safety with RW.    Activities of Daily Living:  · Grooming: stand by assistance standing at sink.  · Lower Body Dressing: stand by assistance with reacher and sock aid.  Pt reports having AE at home from mother in law.       [facility] 6 Click ADL: 20    Treatment & Education:  Pt educated on safety at home to decrease risk of falls.  Pt instructed on use of reacher and sock aid for LE drsg.  Pt verbalizes and demonstrates understanding.    Patient left up in chair with all lines intact, call button in reach, RN notified and spouse presentEducation:      GOALS:   Multidisciplinary Problems     Occupational Therapy Goals        Problem: Occupational Therapy Goal    Goal Priority Disciplines Outcome Interventions   Occupational Therapy Goal     OT, PT/OT Ongoing (interventions implemented as appropriate)    Description:  Goals to be met by: 4/6/19     Patient will increase functional independence with ADLs by performing:    LE Dressing with Supervision.  Grooming while standing with Supervision.  Toileting from toilet with Supervision for hygiene and clothing management.   Supine to sit with Supervision.  Step transfer with Supervision  Toilet transfer to toilet with Supervision.                      Time Tracking:     OT Date of Treatment: 03/07/19  OT Start Time: 0945  OT Stop Time: 1016  OT Total Time (min): 31 min    Billable Minutes:Self Care/Home Management 31    RADHA Jeffries  3/7/2019

## 2019-03-07 NOTE — PT/OT/SLP PROGRESS
"Physical Therapy Treatment    Patient Name:  Vivienne Jose   MRN:  [id number]    Recommendations:     Discharge Recommendations:  (HHPT/OT)   Discharge Equipment Recommendations: none   Barriers to discharge: None    Assessment:     Vivienne Jose is a 68 y.o. female admitted with a medical diagnosis of S/P total knee arthroplasty, right.  She presents with the following impairments/functional limitations:  gait instability, weakness, impaired endurance, impaired functional mobilty, impaired balance, decreased lower extremity function, pain, decreased ROM, orthopedic precautions, impaired skin, edema.  Pt demonstrated an increase in ambulation distance today with occasional vc's for proper stepping/advancement of RLE secondary to pt stepping with increased hip/knee flexion(marching step).  Pt would continue to benefit from P.T. To TKA protocol.    Rehab Prognosis: Good; patient would benefit from acute skilled PT services to address these deficits and reach maximum level of function.    Recent Surgery: Procedure(s) (LRB):  ARTHROPLASTY, KNEE (Right) 1 Day Post-Op    Plan:     During this hospitalization, patient to be seen BID to address the identified rehab impairments via gait training, therapeutic activities, therapeutic exercises and progress toward the following goals:    · Plan of Care Expires:  04/06/19    Subjective     Chief Complaint: "My thigh is sore."  Patient/Family Comments/goals: Pt agreed to tx.  Pain/Comfort:  · Pain Rating 1: 1/10(sitting in chair)  · Pain Addressed 1: Pre-medicate for activity, Distraction, Reposition  · Pain Rating Post-Intervention 1: 1/10(pain level 5 with gait and down to 1 once returned to bed.)      Objective:     Communicated with RN(Lupis) prior to session.  Patient found all lines intact, call button in reach, chair alarm on and RN notified    upon PT entry to room.     General Precautions: Standard, fall   Orthopedic Precautions:RLE weight bearing as tolerated   Braces: "       Functional Mobility:  · Bed Mobility:     · Scooting: stand by assistance and to edge of chair and sitting EOB back into bed.  · Sit to Supine: minimum assistance and RLE  · Transfers:     · Sit to Stand:  minimum assistance with rolling walker  · Gait: 40ft x 2 with RW and CGA.  Pt ambulates with decreased fran and 3pt gait.  VC's for proper step with RLE secondary to pt advancing RLE with increased hip/knee flexion (march like step).  No SOB, but a little unsteady when pt attempted to increase fran.  · Balance: sitting good, standing RW fair+, gait fair RW      AM-PAC 6 CLICK MOBILITY  Turning over in bed (including adjusting bedclothes, sheets and blankets)?: 3  Sitting down on and standing up from a chair with arms (e.g., wheelchair, bedside commode, etc.): 3  Moving from lying on back to sitting on the side of the bed?: 3  Moving to and from a bed to a chair (including a wheelchair)?: 3  Need to walk in hospital room?: 3  Climbing 3-5 steps with a railing?: 2  Basic Mobility Total Score: 17       Therapeutic Activities and Exercises:   Seated BLE therex: AP, LAQ(CGA R) x 10 reps.  R knee flex/ext with foot on towel on floor to assist with sliding x 12 reps.  Supine QS x 15 reps.  Static standing with RW and SBA while adjusting gown prior to returning supine.    Patient left supine with all lines intact, call button in reach, bed alarm on and RN notified..    GOALS:   Multidisciplinary Problems     Physical Therapy Goals        Problem: Physical Therapy Goal    Goal Priority Disciplines Outcome Goal Variances Interventions   Physical Therapy Goal     PT, PT/OT Ongoing (interventions implemented as appropriate)     Description:  Goals to be met by: 19     Patient will increase functional independence with mobility by performin. Supine <> sit with Stand-by Assistance  2. Sit to stand transfer with Stand-by Assistance  3. Bed to chair transfer with Stand-by Assistance using Rolling  Walker  4. Gait  x 100 feet with Stand-by Assistance using Rolling Walker.   5. Ascend/descend 5 stairs with right Handrails Contact Guard Assistance   6. Lower extremity exercise program x 10 reps per handout, with supervision                    Time Tracking:     PT Received On: 03/07/19  PT Start Time: 1213     PT Stop Time: 1256  PT Total Time (min): 43 min     Billable Minutes: Gait Training 17, Therapeutic Activity 12 and Therapeutic Exercise 14    Treatment Type: Treatment  PT/PTA: PTA     PTA Visit Number: 1     Radha Tello PTA  03/07/2019

## 2019-03-07 NOTE — PLAN OF CARE
Problem: Physical Therapy Goal  Goal: Physical Therapy Goal  Goals to be met by: 19     Patient will increase functional independence with mobility by performin. Supine <> sit with Stand-by Assistance  2. Sit to stand transfer with Stand-by Assistance  3. Bed to chair transfer with Stand-by Assistance using Rolling Walker  4. Gait  x 100 feet with Stand-by Assistance using Rolling Walker.   5. Ascend/descend 5 stairs with right Handrails Contact Guard Assistance   6. Lower extremity exercise program x 10 reps per handout, with supervision   Outcome: Ongoing (interventions implemented as appropriate)  Continue working toward goals.

## 2019-03-07 NOTE — PLAN OF CARE
Problem: Diabetes Comorbidity  Goal: Blood Glucose Level Within Desired Range  Outcome: Ongoing (interventions implemented as appropriate)       Problem: Adult Inpatient Plan of Care  Goal: Plan of Care Review  Outcome: Ongoing (interventions implemented as appropriate)  Patient AAOx4, VSS, Blood glucose monitored throughout shift, insulin given per MD order. Patient complaining of pain during activity, managed with scheduled tylenol and oxycodone IR tablet. Patient free from falls. Patient tolerating diet, no nausea or vomiting. Safety maintained, family at bedside. Preparing for discharge     Problem: Fall Injury Risk  Goal: Absence of Fall and Fall-Related Injury  Outcome: Ongoing (interventions implemented as appropriate)       Problem: Pain Acute  Goal: Optimal Pain Control  Outcome: Ongoing (interventions implemented as appropriate)

## 2019-03-08 NOTE — PT/OT/SLP DISCHARGE
Physical Therapy Discharge Summary    Name: Vivienne Jose  MRN: [id number]   Principal Problem: S/P total knee arthroplasty, right     Patient Discharged from acute Physical Therapy on 3/7/19.  Please refer to prior PT noted date on 3/7/19 for functional status.     Assessment:     Patient appropriate for care in another setting.    Objective:     GOALS:   Multidisciplinary Problems     Physical Therapy Goals        Problem: Physical Therapy Goal    Goal Priority Disciplines Outcome Goal Variances Interventions   Physical Therapy Goal     PT, PT/OT Ongoing (interventions implemented as appropriate)     Description:  Goals to be met by: 19     Patient will increase functional independence with mobility by performin. Supine <> sit with Stand-by Assistance  2. Sit to stand transfer with Stand-by Assistance  3. Bed to chair transfer with Stand-by Assistance using Rolling Walker  4. Gait  x 100 feet with Stand-by Assistance using Rolling Walker.   5. Ascend/descend 5 stairs with right Handrails Contact Guard Assistance   6. Lower extremity exercise program x 10 reps per handout, with supervision                    Reasons for Discontinuation of Therapy Services  Transfer to alternate level of care.      Plan:     Patient Discharged to: Home with Home Health Service.    Anamika Marin, PT  3/8/2019

## 2019-03-09 ENCOUNTER — PATIENT MESSAGE (OUTPATIENT)
Dept: ADMINISTRATIVE | Facility: OTHER | Age: 69
End: 2019-03-09

## 2019-03-09 ENCOUNTER — PATIENT MESSAGE (OUTPATIENT)
Dept: ORTHOPEDICS | Facility: CLINIC | Age: 69
End: 2019-03-09

## 2019-03-14 ENCOUNTER — PATIENT MESSAGE (OUTPATIENT)
Dept: ADMINISTRATIVE | Facility: OTHER | Age: 69
End: 2019-03-14

## 2019-03-21 ENCOUNTER — OFFICE VISIT (OUTPATIENT)
Dept: ORTHOPEDICS | Facility: CLINIC | Age: 69
End: 2019-03-21
Payer: MEDICARE

## 2019-03-21 ENCOUNTER — HOSPITAL ENCOUNTER (OUTPATIENT)
Dept: RADIOLOGY | Facility: HOSPITAL | Age: 69
Discharge: HOME | End: 2019-03-21
Attending: ORTHOPAEDIC SURGERY
Payer: MEDICARE

## 2019-03-21 ENCOUNTER — TELEPHONE (OUTPATIENT)
Dept: ORTHOPEDICS | Facility: CLINIC | Age: 69
End: 2019-03-21

## 2019-03-21 DIAGNOSIS — Z96.651 HISTORY OF TOTAL RIGHT KNEE REPLACEMENT: Primary | ICD-10-CM

## 2019-03-21 DIAGNOSIS — Z98.890 S/P RIGHT KNEE SURGERY: Primary | ICD-10-CM

## 2019-03-21 DIAGNOSIS — Z98.890 S/P RIGHT KNEE SURGERY: ICD-10-CM

## 2019-03-21 PROCEDURE — 73562 X-RAY EXAM OF KNEE 3: CPT | Mod: TC,HCNC,PN,RT

## 2019-03-21 PROCEDURE — 99999 PR PBB SHADOW E&M-EST. PATIENT-LVL II: ICD-10-PCS | Mod: PBBFAC,HCNC,, | Performed by: ORTHOPAEDIC SURGERY

## 2019-03-21 PROCEDURE — 99024 POSTOP FOLLOW-UP VISIT: CPT | Mod: HCNC,S$GLB,, | Performed by: ORTHOPAEDIC SURGERY

## 2019-03-21 PROCEDURE — 73562 X-RAY EXAM OF KNEE 3: CPT | Mod: 26,HCNC,RT, | Performed by: RADIOLOGY

## 2019-03-21 PROCEDURE — 73562 XR KNEE 3 VIEW RIGHT: ICD-10-PCS | Mod: 26,HCNC,RT, | Performed by: RADIOLOGY

## 2019-03-21 PROCEDURE — 99999 PR PBB SHADOW E&M-EST. PATIENT-LVL II: CPT | Mod: PBBFAC,HCNC,, | Performed by: ORTHOPAEDIC SURGERY

## 2019-03-21 PROCEDURE — 99024 PR POST-OP FOLLOW-UP VISIT: ICD-10-PCS | Mod: HCNC,S$GLB,, | Performed by: ORTHOPAEDIC SURGERY

## 2019-03-21 RX ORDER — AMOXICILLIN 500 MG/1
500 CAPSULE ORAL 3 TIMES DAILY
Qty: 12 CAPSULE | Refills: 0 | Status: SHIPPED | OUTPATIENT
Start: 2019-03-21 | End: 2019-04-30

## 2019-03-21 NOTE — PROGRESS NOTES
Subjective:      Patient ID: Vivienne Jose is a 68 y.o. female.    Chief Complaint: No chief complaint on file.      HPI:  Two weeks postop  The patient is seen for postop follow-up of right  TKA.  Pain control has been satisfactory  They feel that they are ambulating easily  Preoperative complaints include:  None at this time      Current Outpatient Medications:     amLODIPine (NORVASC) 5 MG tablet, Take 1 tablet (5 mg total) by mouth once daily., Disp: 90 tablet, Rfl: 3    aspirin (ECOTRIN) 81 MG EC tablet, Take 81 mg by mouth once daily., Disp: , Rfl:     azelastine (ASTELIN) 137 mcg (0.1 %) nasal spray, 2 sprays (274 mcg total) by Nasal route 2 (two) times daily., Disp: 90 mL, Rfl: 4    biotin 10,000 mcg Cap, Take by mouth., Disp: , Rfl:     blood sugar diagnostic (TRUE METRIX GLUCOSE TEST STRIP) Strp, TEST TWO TIMES DAILY, Disp: 200 strip, Rfl: 6    blood-glucose meter Misc, [employer] True Metrix Air meter, Disp: 1 each, Rfl: 0    calcium carbonate-vitamin D3 600 mg(1,500mg) -100 unit Cap, Take 1 tablet by mouth once daily., Disp: , Rfl:     chlorthalidone (HYGROTEN) 25 MG Tab, Take 1 tablet (25 mg total) by mouth once daily., Disp: 90 tablet, Rfl: 1    diclofenac sodium (VOLTAREN) 1 % Gel, Apply 2 g topically 4 (four) times daily., Disp: 3 Tube, Rfl: 3    econazole nitrate 1 % cream, Mix with hydrocortisone cream 1% 30 grams use bid prn, Disp: 60 g, Rfl: 3    fluticasone (FLONASE) 50 mcg/actuation nasal spray, 2 sprays (100 mcg total) by Each Nare route once daily., Disp: 48 g, Rfl: 4    gabapentin (NEURONTIN) 300 MG capsule, Take 2 capsules (600 mg total) by mouth 2 (two) times daily., Disp: 180 capsule, Rfl: 1    glucagon (human recombinant) inj 1mg/mL kit, Inject 1 mL (1 mg total) into the muscle as needed., Disp: 1 kit, Rfl: 6    insulin degludec (TRESIBA FLEXTOUCH U-100) 100 unit/mL (3 mL) InPn, Inject 22 Units into the skin every evening., Disp: 15 Syringe, Rfl: 4    ketotifen (ZADITOR) 0.025  "% (0.035 %) ophthalmic solution, Place 1-2 drops into both eyes once daily., Disp: , Rfl:     L.acid-B.bifidum-B.animal-FOS (PROBIOTIC COMPLEX) 25 billion cell -100 mg Cap, Take 1 capsule by mouth once daily., Disp: , Rfl:     lancets (TRUEPLUS LANCETS) 28 gauge Misc, Inject 1 lancet into the skin 2 (two) times daily before meals., Disp: 200 each, Rfl: 6    levothyroxine (SYNTHROID) 75 MCG tablet, Take 1 tablet (75 mcg total) by mouth once daily., Disp: 90 tablet, Rfl: 3    liraglutide 0.6 mg/0.1 mL, 18 mg/3 mL, subq PNIJ (VICTOZA 3-TAWANA) 0.6 mg/0.1 mL (18 mg/3 mL) PnIj, INJECT 1.8 MG INTO THE SKIN ONCE DAILY., Disp: 27 mL, Rfl: 11    loratadine (CLARITIN) 10 mg tablet, Take 10 mg by mouth once daily., Disp: , Rfl:     lovastatin (MEVACOR) 40 MG tablet, TAKE 1 TABLET NIGHTLY (SUBSTITUTED FOR MEVACOR), Disp: 90 tablet, Rfl: 0    magnesium oxide-Mg AA chelate (MAGNESIUM, AMINO ACID CHELATE,) 133 mg Tab, Take by mouth as directed. , Disp: , Rfl:     metFORMIN (GLUCOPHAGE) 1000 MG tablet, Take 1 tablet (1,000 mg total) by mouth 2 (two) times daily with meals., Disp: 180 tablet, Rfl: 3    nitrofurantoin, macrocrystal-monohydrate, (MACROBID) 100 MG capsule, Take 1 capsule (100 mg total) by mouth nightly., Disp: 14 capsule, Rfl: 0    omeprazole (PRILOSEC) 20 MG capsule, Take 1 capsule (20 mg total) by mouth daily as needed., Disp: 90 capsule, Rfl: 1    oxyCODONE-acetaminophen (PERCOCET) 5-325 mg per tablet, Take 1 tablet by mouth every 4 hours as needed for pain, Disp: 28 tablet, Rfl: 0    oxyCODONE-acetaminophen (PERCOCET) 5-325 mg per tablet, Take 1 tablet by mouth every 6 (six) hours as needed for Pain. Fill this prescription if needed after your 1st 1 is done  Note pharmacy:  Recent major surgery-please allow early refill if patient requests, Disp: 40 tablet, Rfl: 0    pen needle, diabetic (BD ULTRA-FINE EDUARDO PEN NEEDLE) 32 gauge x 5/32" Ndle, USE WITH VICTOZA AND LEVEMIR (2 INJECTIONS EVERY DAY), Disp: 100 " each, Rfl: 3    potassium chloride SA (K-DUR,KLOR-CON) 10 MEQ tablet, Take 10 meq PO QD except on Mondays, Wednesdays and Fridays, on which she should take 2 tabs (20 meq) QD, Disp: 145 tablet, Rfl: 1    PREMARIN vaginal cream, Place 0.5 g vaginally 3 (three) times a week., Disp: 30 g, Rfl: 3    valACYclovir (VALTREX) 500 MG tablet, TAKE 1 TABLET ONE TIME DAILY, Disp: 90 tablet, Rfl: 0    valsartan (DIOVAN) 160 MG tablet, TAKE 1 TABLET TWICE DAILY, Disp: 180 tablet, Rfl: 0    Current Facility-Administered Medications:     Allergy Mix, , Subcutaneous, 1 time in Clinic/HOD, Ailin Mills MD    Allergy Mix, , Subcutaneous, 1 time in Clinic/HOD, Ailin Mills MD  Review of patient's allergies indicates:   Allergen Reactions    Sulfa (sulfonamide antibiotics) Nausea Only    Ciprofloxacin     Januvia [sitagliptin]      abd pain    Lisinopril     Lotensin [benazepril]     Nexium [esomeprazole magnesium] Other (See Comments)     Gas       LMP  (LMP Unknown)     ROS        Objective:    Ortho Exam          Alert, oriented, no acute distress  Gait mild limp  Incision:  Normally healed  Range of motion: extension- 5 degrees; flexion- 100 degrees  Valgus/varus stability- stable  mild    Assessment:     Imaging:  Radiographs show well-positioned right knee replacement        1. History of total right knee replacement        Patient's progress is normal after the procedure        Plan:          No Follow-up on file.    I explained my assessment and the natural history of recovery from the operation    Amoxicillin given for upcoming dental work-I explained my usual postop protocol regarding this    Transition to outpatient therapy    May return to work next month

## 2019-03-26 NOTE — PROGRESS NOTES
Patient here for allergy injection.  No new meds, no problems with last injection.    0.5 mL of Red 1:1 given SQ in upper Left arm.  Tolerated well.    Site checked after 30 minutes,1+ reaction of erythema and wheal noted.  No complaints voiced.    
No

## 2019-03-27 ENCOUNTER — TELEPHONE (OUTPATIENT)
Dept: ADMINISTRATIVE | Facility: CLINIC | Age: 69
End: 2019-03-27

## 2019-04-04 ENCOUNTER — PATIENT OUTREACH (OUTPATIENT)
Dept: OTHER | Facility: OTHER | Age: 69
End: 2019-04-04

## 2019-04-04 NOTE — PROGRESS NOTES
"Last 5 Patient Entered Readings                                      Current 30 Day Average: 133/64     Recent Readings 4/3/2019 4/3/2019 4/3/2019 4/3/2019 4/2/2019    SBP (mmHg) 138 138 141 141 147    DBP (mmHg) 61 61 57 57 70    Pulse 75 75 77 77 88          Last 6 Patient Entered Readings                                          Most Recent A1c: 6.9% on 2/8/2019  (Goal: 7%)     Recent Readings 4/4/2019 4/3/2019 4/3/2019 4/2/2019 4/2/2019    Blood Glucose (mg/dL) 172 198 132 138 117        Digital Medicine: Health  Follow Up    Lifestyle Modifications:    1.Dietary Modifications (Sodium intake <2,000mg/day, food labels, dining out): Patient reports that she has not cooked since surgery. She did order [employer], and had been eating some of those meals. She reports lack of appetite for about a week after surgery.     2.Physical Activity: Patient is 4 weeks post op from a knee replacement. She will start physical therapy. She reports that her pain level is "okay", but she has trouble getting comfortable at night. She finds relief from pain when she walks, so she has been trying to walk more often.     3.Medication Therapy: Patient has been compliant with the medication regimen.    4.Patient has the following medication side effects/concerns: None  (Frequency/Alleviating factors/Precipitating factors, etc.)     Follow up with Mrs. Vivienne Jose completed. Mrs. Jose is doing okay. No further questions or concerns. Will continue to follow up to achieve health goals.      "

## 2019-04-05 ENCOUNTER — PATIENT MESSAGE (OUTPATIENT)
Dept: ADMINISTRATIVE | Facility: OTHER | Age: 69
End: 2019-04-05

## 2019-04-05 ENCOUNTER — CLINICAL SUPPORT (OUTPATIENT)
Dept: REHABILITATION | Facility: HOSPITAL | Age: 69
End: 2019-04-05
Attending: ORTHOPAEDIC SURGERY
Payer: MEDICARE

## 2019-04-05 ENCOUNTER — CLINICAL SUPPORT (OUTPATIENT)
Dept: INTERNAL MEDICINE | Facility: CLINIC | Age: 69
End: 2019-04-05
Payer: MEDICARE

## 2019-04-05 DIAGNOSIS — G89.29 CHRONIC PAIN OF RIGHT KNEE: ICD-10-CM

## 2019-04-05 DIAGNOSIS — M25.661 DECREASED RANGE OF MOTION OF RIGHT KNEE: ICD-10-CM

## 2019-04-05 DIAGNOSIS — M25.561 CHRONIC PAIN OF RIGHT KNEE: ICD-10-CM

## 2019-04-05 DIAGNOSIS — J30.9 CHRONIC ALLERGIC RHINITIS: ICD-10-CM

## 2019-04-05 DIAGNOSIS — M62.81 MUSCLE WEAKNESS OF LOWER EXTREMITY: ICD-10-CM

## 2019-04-05 DIAGNOSIS — R26.89 IMPAIRED GAIT AND MOBILITY: ICD-10-CM

## 2019-04-05 DIAGNOSIS — J30.89 ALLERGIC RHINITIS DUE TO DUST MITE: ICD-10-CM

## 2019-04-05 PROCEDURE — G8978 MOBILITY CURRENT STATUS: HCPCS | Mod: CK,HCNC,PN

## 2019-04-05 PROCEDURE — 95115 PR IMMUNOTHERAPY, ONE INJECTION: ICD-10-PCS | Mod: HCNC,S$GLB,, | Performed by: FAMILY MEDICINE

## 2019-04-05 PROCEDURE — 97161 PT EVAL LOW COMPLEX 20 MIN: CPT | Mod: HCNC,PN

## 2019-04-05 PROCEDURE — 95115 IMMUNOTHERAPY ONE INJECTION: CPT | Mod: HCNC,S$GLB,, | Performed by: FAMILY MEDICINE

## 2019-04-05 PROCEDURE — G8979 MOBILITY GOAL STATUS: HCPCS | Mod: CJ,HCNC,PN

## 2019-04-05 PROCEDURE — 99499 UNLISTED E&M SERVICE: CPT | Mod: HCNC,S$GLB,, | Performed by: ALLERGY & IMMUNOLOGY

## 2019-04-05 PROCEDURE — 99499 NO LOS: ICD-10-PCS | Mod: HCNC,S$GLB,, | Performed by: ALLERGY & IMMUNOLOGY

## 2019-04-05 PROCEDURE — 97110 THERAPEUTIC EXERCISES: CPT | Mod: HCNC,PN

## 2019-04-05 NOTE — PLAN OF CARE
[facility]  Physical Therapy Initial Evaluation    Name: Vivienne Jose  Clinic Number: [id number]    Therapy Diagnosis:   Encounter Diagnoses   Name Primary?    Chronic pain of right knee     Decreased range of motion of right knee     Muscle weakness of lower extremity     Impaired gait and mobility      Physician: Pj Gamboa MD    Physician Orders: PT Eval and Treat   Medical Diagnosis from Referral: Z96.651 (ICD-10-CM) - History of total right knee replacement  Evaluation Date: 4/5/2019  Authorization Period Expiration: 12/31/19  Plan of Care Expiration: 5/17/19  Visit # / Visits authorized: 1/ 50  FOTO: 1/10    Gcode: 1/10  Visit:  113.20  Total: 113.20    Time In: 8:08 am  Time Out: 9:10 am   Total Billable Time: 52 minutes   Precautions: Standard, Standard, Diabetes and seizure disorder, diabetic neuropathy, HTN    Subjective     Date of onset: DOS: 3/6/19    History of current condition - Vivienne reports: she had a right TKA on 3/6/19. Pt stated that she has had home health PT which ended approximately one week ago. Pt stated that she has not been using AD in a couple of weeks, except uses RW when she gets up during the night.        Past Medical History:   Diagnosis Date    Allergy     Angio-edema     Arthritis     Cataract     Cerebrovascular malformation     Colon polyps     Coronary artery disease     Diabetes mellitus, type 2     Diabetic peripheral neuropathy     GERD (gastroesophageal reflux disease)     Herpes infection     Hyperlipidemia     Hypertension     Hypothyroidism     Kidney stones     Nausea & vomiting 11/23/2015    Obesity, morbid     Ovarian cyst     Pyelonephritis, chronic     Seizure disorder, focal motor     Sleep apnea     Type II or unspecified type diabetes mellitus with neurological manifestations, uncontrolled(250.62)     Urinary tract infection     Urticaria     Vaginal infection      Vivienne Jose  has a past surgical  history that includes Hysterectomy (1984); Thyroidectomy (1977); Carpal tunnel release (Right, 2014); Cyst Removal; Tubal ligation; Colonoscopy; TONSILLECTOMY, ADENOIDECTOMY; Extracorporeal shock wave lithotripsy (2002); Colonoscopy (N/A, 9/13/2017); Trigger finger release; Kidney stone surgery; and Knee Arthroplasty (Right, 3/6/2019).    Vivienne has a current medication list which includes the following prescription(s): amlodipine, amoxicillin, aspirin, azelastine, biotin, blood sugar diagnostic, blood-glucose meter, calcium carbonate-vitamin d3, chlorthalidone, diclofenac sodium, econazole nitrate, fluticasone, gabapentin, glucagon (human recombinant), insulin degludec, ketotifen, l.acid-b.bifidum-b.animal-fos, lancets, levothyroxine, liraglutide 0.6 mg/0.1 ml (18 mg/3 ml) subq pnij, loratadine, lovastatin, magnesium oxide-mg aa chelate, metformin, nitrofurantoin (macrocrystal-monohydrate), omeprazole, oxycodone-acetaminophen, oxycodone-acetaminophen, pen needle, diabetic, potassium chloride sa, premarin, valacyclovir, and valsartan, and the following Facility-Administered Medications: allergy mix and allergy mix.    Review of patient's allergies indicates:   Allergen Reactions    Sulfa (sulfonamide antibiotics) Nausea Only    Ciprofloxacin     Januvia [sitagliptin]      abd pain    Lisinopril     Lotensin [benazepril]     Nexium [esomeprazole magnesium] Other (See Comments)     Gas        Imaging: xray of (R) knee: see imaging section in pt chart review    Prior Therapy: yes - home health after surgery; healthy back PT  Social History: Pt stated that she lives with her  and her mother. Pt stated that she has 4-5 steps with handrail on one side to enter her [facility].   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   Prior Level of Function: pt stated that she was using cane prior to surgery  Current Level of Function: ambulating without AD, except uses  "RW when wakes up at night    Pain:   Current 0/10, worst 5/10, best 0/10   Location: right knee   Description: Aching and Sharp  Aggravating Factors: Night Time  Easing Factors: pain medication, ice and elevation    Pts goals: walk better    Objective     AROM:  right knee: 8 - 94 degrees    PROM:   right knee:4 - 102 degrees    L/E Strength w/ MicroFET Muscle Roxanne Dynamometer Right Left Pain/Dysfunction with Movement   (approx 4 sec hold w/ max contraction)   Hip Flexion 11.2 kg  14.5 kg     Hip Abduction 17.0 kg  13.2 kg     Quadriceps 10.6 kg  12.2 kg     Hamstrings 13.7 kg  13.0 kg       TUG:  10 seconds (w/o AD)    30 second sit-to-stand test (without U/E support): 8     Gait Analysis: decreased fran, decreased (R) TKE in stance    CMS Impairment/Limitation/Restriction FOTO knee survey    Therapist reviewed FOTO score for Vivienne Jose on 4/5/2019.   KOOS documents entered into [employer] - see Media section.    Limitation Score: 51%  Category: Mobility    Current : CK = at least 40% but < 60% impaired, limited or restricted  Goal: CJ = at least 20% but < 40% impaired, limited or restricted           TREATMENT     Treatment Time In: 8:35 am  Treatment Time Out: 9:10 am   Total Treatment time separate from Evaluation: 35 minutes    Vivienne received therapeutic exercises to develop strength, ROM and flexibility for 20 minutes including below and bike x 5' supervised    Bike x 5' supervised  TKE 2x10 3" hold  Seated HSS 2' foot propped on chair  gastroc stretch w/strap 3x30"   Heel slides 10"x10 w/straps    Vivienne received cold pack for 10 minutes to (R) knee.      Home Exercises and Patient Education Provided    Education provided:   - HEP   - Instructed pt to continue performing quad sets, and standing hip flexion, abduction, and extension therex that she had been performing with home health. Pt verbalized understanding.     Written Home Exercises Provided: yes.  Exercises were reviewed and Vivienne was able to " demonstrate them prior to the end of the session.  Vivienne demonstrated good  understanding of the education provided.     See EMR under Patient Instructions for exercises provided 4/5/2019.    Assessment     Vivienne is a 68 y.o. female referred to outpatient Physical Therapy with a medical diagnosis of history of total right knee replacement. Pt presents to PT with c/o (R) knee pain, decreased right knee ROM, decreased strength,  impaired balance and gait, and decreased functional mobility. Pt would benefit from skilled PT consisting of manual therapy including STM/MFR right  knee, patellar mobs, knee flex/ext mobs/stretching; therapeutic exercise including LE strengthening/stretching, postural education, balance and gait training, and modalities prn to address limitations and increase functional mobility.      Pt prognosis is Good.   Pt will benefit from skilled outpatient Physical Therapy to address the deficits stated above and in the chart below, provide pt/family education, and to maximize pt's level of independence.     Plan of care discussed with patient: Yes  Pt's spiritual, cultural and educational needs considered and patient is agreeable to the plan of care and goals as stated below:     Anticipated Barriers for therapy: comorbidities, coping style,  pain tolerance     Medical Necessity is demonstrated by the following  History  Co-morbidities and personal factors that may impact the plan of care Co-morbidities:   diabetes, high BMI, HTN and seizure disorder, diabetic neuropathy    Personal Factors:   coping style     high   Examination  Body Structures and Functions, activity limitations and participation restrictions that may impact the plan of care Body Regions:   lower extremities    Body Systems:    ROM  strength  gait  transfers    Participation Restrictions:   None mentioned    Activity limitations:   Learning and applying knowledge  no deficits    General Tasks and Commands  no  deficits    Communication  no deficits    Mobility  walking    Self care  no deficits    Domestic Life  no deficits    Interactions/Relationships  no deficits    Life Areas  no deficits    Community and Social Life  no deficits         high   Clinical Presentation stable and uncomplicated low   Decision Making/ Complexity Score: low     Goals:    Long Term Goals:  1. Pt will be independent with updated HEP supplement PT in improving functional mobility.  2. Pt will improve (R)  LE strength to at least 90% of (L) LE strength as measured via MicroFet handheld dynamometer in order to improve functional mobility  3. Pt will improve (R) knee AROM to at least 2-115 degrees in order to improve gait and ability to perform ADLs  4. Pt will improve FOTO knee survey score to </= 35% limited in order to demo improved functional mobility  5. Pt will perform TUG in < 10 seconds without AD in order to demo improved gait speed  6. Pt will perform at least 15 sit to stands without UE support on 30 second sit to stand test in order to demo improved ability to perform transfers        Plan     Plan of care Certification: 4/5/2019 to 5/17/19.    Outpatient Physical Therapy 2 times weekly for 6 weeks to include the following interventions: Gait Training, Manual Therapy, Moist Heat/ Ice, Neuromuscular Re-ed, Orthotic Management and Training, Patient Education, Therapeutic Activites and Therapeutic Exercise, ASTYM, Kinesiotape    Abby Fernandez, PT, DPT

## 2019-04-09 ENCOUNTER — LAB VISIT (OUTPATIENT)
Dept: LAB | Facility: HOSPITAL | Age: 69
End: 2019-04-09
Attending: INTERNAL MEDICINE
Payer: MEDICARE

## 2019-04-09 ENCOUNTER — CLINICAL SUPPORT (OUTPATIENT)
Dept: REHABILITATION | Facility: HOSPITAL | Age: 69
End: 2019-04-09
Attending: ORTHOPAEDIC SURGERY
Payer: MEDICARE

## 2019-04-09 DIAGNOSIS — R26.89 IMPAIRED GAIT AND MOBILITY: ICD-10-CM

## 2019-04-09 DIAGNOSIS — N20.0 KIDNEY STONES: ICD-10-CM

## 2019-04-09 DIAGNOSIS — M25.661 DECREASED RANGE OF MOTION OF RIGHT KNEE: ICD-10-CM

## 2019-04-09 DIAGNOSIS — G89.29 CHRONIC PAIN OF RIGHT KNEE: ICD-10-CM

## 2019-04-09 DIAGNOSIS — M62.81 MUSCLE WEAKNESS OF LOWER EXTREMITY: ICD-10-CM

## 2019-04-09 DIAGNOSIS — M25.561 CHRONIC PAIN OF RIGHT KNEE: ICD-10-CM

## 2019-04-09 LAB
ALBUMIN SERPL BCP-MCNC: 3.4 G/DL (ref 3.5–5.2)
ANION GAP SERPL CALC-SCNC: 10 MMOL/L (ref 8–16)
BUN SERPL-MCNC: 14 MG/DL (ref 8–23)
CALCIUM SERPL-MCNC: 9.9 MG/DL (ref 8.7–10.5)
CHLORIDE SERPL-SCNC: 100 MMOL/L (ref 95–110)
CO2 SERPL-SCNC: 30 MMOL/L (ref 23–29)
CREAT SERPL-MCNC: 0.8 MG/DL (ref 0.5–1.4)
EST. GFR  (AFRICAN AMERICAN): >60 ML/MIN/1.73 M^2
EST. GFR  (NON AFRICAN AMERICAN): >60 ML/MIN/1.73 M^2
GLUCOSE SERPL-MCNC: 148 MG/DL (ref 70–110)
PHOSPHATE SERPL-MCNC: 3.3 MG/DL (ref 2.7–4.5)
POTASSIUM SERPL-SCNC: 3.5 MMOL/L (ref 3.5–5.1)
PTH-INTACT SERPL-MCNC: 29 PG/ML (ref 9–77)
SODIUM SERPL-SCNC: 140 MMOL/L (ref 136–145)

## 2019-04-09 PROCEDURE — 83970 ASSAY OF PARATHORMONE: CPT | Mod: HCNC

## 2019-04-09 PROCEDURE — 97140 MANUAL THERAPY 1/> REGIONS: CPT | Mod: HCNC,PN

## 2019-04-09 PROCEDURE — 80069 RENAL FUNCTION PANEL: CPT | Mod: HCNC

## 2019-04-09 PROCEDURE — 97110 THERAPEUTIC EXERCISES: CPT | Mod: HCNC,PN

## 2019-04-09 PROCEDURE — 36415 COLL VENOUS BLD VENIPUNCTURE: CPT | Mod: HCNC,PO

## 2019-04-09 NOTE — PROGRESS NOTES
"  Physical Therapy Daily Treatment Note     Name: Vivienne Jose  Clinic Number: [id number]    Therapy Diagnosis:   Encounter Diagnoses   Name Primary?    Chronic pain of right knee     Decreased range of motion of right knee     Muscle weakness of lower extremity     Impaired gait and mobility      Physician: Pj Gamboa MD    Visit Date: 4/9/2019    Physician Orders: PT Eval and Treat   Medical Diagnosis from Referral: Z96.651 (ICD-10-CM) - History of total right knee replacement  Evaluation Date: 4/5/2019  Authorization Period Expiration: 12/31/19  Plan of Care Expiration: 5/17/19  Visit # / Visits authorized: 2/ 50  FOTO: 2/10    Time In: 12:05  Time Out: 1:10  Total Billable Time:  55 minutes    Precautions: Standard    Subjective     Pt reports: she.doesn't really have pain  She was partially  compliant with home exercise program. "not every day."  Response to previous treatment: no adverse effects  Functional change: none    Pain: 0/10  Location: right knee    Objective   AROM:  right knee: 6 - 98 degrees     PROM:   right knee:3 - 102 degrees    Vivienne received therapeutic exercises to develop strength, endurance, ROM, flexibility, posture and core stabilization for 45 minutes including:    Bike x 5'  supervised complete revolutions  Supine:   Quad sets                                   5"x10  TKE                                             2x10 3" hold      Heel slides                                  10"x10 w/straps  Bridges                                        2x10 B w/3" hold  SLR                                             2x10 B     Side Lying:  Hip Abd                                       2x10 R     Seated:  LAQ                                             2x10 B  HSC                                             2x10 GTB  Hamstring stretch                       3x30" hold    Standing:   Calf stretch                                3x30" on fitter           Vivienne received the following manual " therapy techniques: Joint mobilizations, Myofacial release, Soft tissue Mobilization and Friction Massage were applied to the: right quad, hamstring, gastroc for 10 minutes, including:  -SPT/MFR    Vivienne received cold pack for 10 minutes to (R) knee.    Home Exercises Provided and Patient Education Provided     Education provided:   - importance of daily HEP compliance    Written Home Exercises Provided: Patient instructed to cont prior HEP.  Exercises were reviewed and Vivienne was able to demonstrate them prior to the end of the session.  Vivienne demonstrated good  understanding of the education provided.     See EMR under Media for exercises provided prior visit.    Assessment     Tolerated interventions well. Increase ROM    Vivienne is progressing well towards her goals.   Pt prognosis is Good.     Pt will continue to benefit from skilled outpatient physical therapy to address the deficits listed in the problem list box on initial evaluation, provide pt/family education and to maximize pt's level of independence in the home and community environment.     Pt's spiritual, cultural and educational needs considered and pt agreeable to plan of care and goals.     Anticipated barriers to physical therapy: comorbidities, coping style,  pain tolerance       Goals:   Long Term Goals:  1. Pt will be independent with updated HEP supplement PT in improving functional mobility.  2. Pt will improve (R)  LE strength to at least 90% of (L) LE strength as measured via MicroFet handheld dynamometer in order to improve functional mobility  3. Pt will improve (R) knee AROM to at least 2-115 degrees in order to improve gait and ability to perform ADLs  4. Pt will improve FOTO knee survey score to </= 35% limited in order to demo improved functional mobility  5. Pt will perform TUG in < 10 seconds without AD in order to demo improved gait speed  6. Pt will perform at least 15 sit to stands without UE support on 30 second sit to stand test  in order to demo improved ability to perform transfers            Plan     Continue PT POC.     Chirag Dash, PTA

## 2019-04-12 ENCOUNTER — CLINICAL SUPPORT (OUTPATIENT)
Dept: REHABILITATION | Facility: HOSPITAL | Age: 69
End: 2019-04-12
Attending: ORTHOPAEDIC SURGERY
Payer: MEDICARE

## 2019-04-12 DIAGNOSIS — M25.561 CHRONIC PAIN OF RIGHT KNEE: ICD-10-CM

## 2019-04-12 DIAGNOSIS — M25.661 DECREASED RANGE OF MOTION OF RIGHT KNEE: ICD-10-CM

## 2019-04-12 DIAGNOSIS — R26.89 IMPAIRED GAIT AND MOBILITY: ICD-10-CM

## 2019-04-12 DIAGNOSIS — M62.81 MUSCLE WEAKNESS OF LOWER EXTREMITY: ICD-10-CM

## 2019-04-12 DIAGNOSIS — G89.29 CHRONIC PAIN OF RIGHT KNEE: ICD-10-CM

## 2019-04-12 PROCEDURE — 97110 THERAPEUTIC EXERCISES: CPT | Mod: HCNC,PN

## 2019-04-12 PROCEDURE — 97140 MANUAL THERAPY 1/> REGIONS: CPT | Mod: HCNC,PN

## 2019-04-12 NOTE — PROGRESS NOTES
"  Physical Therapy Daily Treatment Note     Name: Vivienne Jose  Clinic Number: [id number]    Therapy Diagnosis:   Encounter Diagnoses   Name Primary?    Chronic pain of right knee     Decreased range of motion of right knee     Muscle weakness of lower extremity     Impaired gait and mobility      Physician: Pj Gamboa MD    Visit Date: 4/12/2019       Physician Orders: PT Eval and Treat   Medical Diagnosis from Referral: Z96.651 (ICD-10-CM) - History of total right knee replacement  Evaluation Date: 4/5/2019  Authorization Period Expiration: 12/31/19  Plan of Care Expiration: 5/17/19  Visit # / Visits authorized: 3/ 50  FOTO: 3/10   PTA visit: 2/6     Time In: 1400  Time Out: 1500  Total Billable Time:  30 minutes (1 MT, 1 TE)     Precautions: Standard      Subjective     Pt reports: she is stiff today.  Denies it as pain.  Relays she had increased pain after last therapy session.  She was compliant with home exercise program.  Response to previous treatment: increased pain that has since subsided  Functional change: none    Pain: 1/10  Location: right knee      Objective     Vivienne received the following manual therapy techniques: Myofacial release and Soft tissue Mobilization were applied to the:right quad, hamstring, gastroc  for 10 minutes    Vivienne received therapeutic exercises to develop strength, ROM and flexibility for 40 minutes including:    Bike x 5'  supervised complete revolutions  Supine:   Quad sets                                   5"x10  TKE                                             2x10 3" hold        Heel slides                                  10"x10 w/straps  Bridges                                        2x10 B w/3" hold  SLR                                             2x10 B      Side Lying:  Hip Abd                                       2x10 R     Seated:  LAQ                                             2x10 B  HSC                                             2x10 GTB     Standing: " "  Calf stretch                                3x30" on fitter  Hamstring stretch                       3x30" hold on stair R    Vivienne received cold pack for 10 minutes to (R) knee      Home Exercises Provided and Patient Education Provided     Education provided:   - cont HEP regularly    Written Home Exercises Provided: Patient instructed to cont prior HEP.  Exercises were reviewed and Vivienne was able to demonstrate them prior to the end of the session.  Vivienne demonstrated good  understanding of the education provided.     See EMR under Patient Instructions for exercises provided 4/5/19.      Assessment     Pt tolerates therapy interventions without difficulties or c/o increased pain  Vivienne is progressing well towards her goals.   Pt prognosis is Excellent.     Pt will continue to benefit from skilled outpatient physical therapy to address the deficits listed in the problem list box on initial evaluation, provide pt/family education and to maximize pt's level of independence in the home and community environment.     Pt's spiritual, cultural and educational needs considered and pt agreeable to plan of care and goals.    Anticipated barriers to physical therapy: comorbidities, coping style,  pain tolerance     Goals:   Long Term Goals:  1. Pt will be independent with updated HEP supplement PT in improving functional mobility.  2. Pt will improve (R)  LE strength to at least 90% of (L) LE strength as measured via MicroFet handheld dynamometer in order to improve functional mobility  3. Pt will improve (R) knee AROM to at least 2-115 degrees in order to improve gait and ability to perform ADLs  4. Pt will improve FOTO knee survey score to </= 35% limited in order to demo improved functional mobility  5. Pt will perform TUG in < 10 seconds without AD in order to demo improved gait speed  6. Pt will perform at least 15 sit to stands without UE support on 30 second sit to stand test in order to demo improved ability to " perform transfers      Plan     Cont POC to progress towards established goals    Mone Hudson PTA

## 2019-04-13 DIAGNOSIS — B00.9 RECURRENT HSV (HERPES SIMPLEX VIRUS): ICD-10-CM

## 2019-04-15 ENCOUNTER — TELEPHONE (OUTPATIENT)
Dept: ORTHOPEDICS | Facility: CLINIC | Age: 69
End: 2019-04-15

## 2019-04-15 DIAGNOSIS — E11.42 DIABETIC POLYNEUROPATHY ASSOCIATED WITH TYPE 2 DIABETES MELLITUS: ICD-10-CM

## 2019-04-15 RX ORDER — GABAPENTIN 300 MG/1
600 CAPSULE ORAL 2 TIMES DAILY
Qty: 180 CAPSULE | Refills: 1 | Status: SHIPPED | OUTPATIENT
Start: 2019-04-15 | End: 2019-08-05 | Stop reason: SDUPTHER

## 2019-04-15 NOTE — TELEPHONE ENCOUNTER
----- Message from Rosa Ham PA-C sent at 4/15/2019  7:45 AM CDT -----  Please call pt and let her know that I refilled her gabapentin as requested. But in the future she should have this filled by her PCP.     Thanks

## 2019-04-16 RX ORDER — VALACYCLOVIR HYDROCHLORIDE 500 MG/1
TABLET, FILM COATED ORAL
Qty: 90 TABLET | Refills: 0 | Status: SHIPPED | OUTPATIENT
Start: 2019-04-16 | End: 2019-07-22 | Stop reason: SDUPTHER

## 2019-04-17 ENCOUNTER — PATIENT MESSAGE (OUTPATIENT)
Dept: ADMINISTRATIVE | Facility: OTHER | Age: 69
End: 2019-04-17

## 2019-04-17 ENCOUNTER — PATIENT OUTREACH (OUTPATIENT)
Dept: OTHER | Facility: OTHER | Age: 69
End: 2019-04-17

## 2019-04-17 ENCOUNTER — CLINICAL SUPPORT (OUTPATIENT)
Dept: OPHTHALMOLOGY | Facility: CLINIC | Age: 69
End: 2019-04-17
Attending: INTERNAL MEDICINE
Payer: MEDICARE

## 2019-04-17 ENCOUNTER — OFFICE VISIT (OUTPATIENT)
Dept: NEPHROLOGY | Facility: CLINIC | Age: 69
End: 2019-04-17
Payer: MEDICARE

## 2019-04-17 VITALS
OXYGEN SATURATION: 99 % | DIASTOLIC BLOOD PRESSURE: 80 MMHG | HEIGHT: 62 IN | SYSTOLIC BLOOD PRESSURE: 160 MMHG | HEART RATE: 79 BPM | WEIGHT: 226.19 LBS | BODY MASS INDEX: 41.62 KG/M2

## 2019-04-17 DIAGNOSIS — N18.30 TYPE 2 DIABETES MELLITUS WITH STAGE 3 CHRONIC KIDNEY DISEASE, WITH LONG-TERM CURRENT USE OF INSULIN: ICD-10-CM

## 2019-04-17 DIAGNOSIS — E11.3293 MILD NONPROLIFERATIVE DIABETIC RETINOPATHY OF BOTH EYES WITHOUT MACULAR EDEMA ASSOCIATED WITH TYPE 2 DIABETES MELLITUS: Primary | ICD-10-CM

## 2019-04-17 DIAGNOSIS — I10 ESSENTIAL HYPERTENSION: ICD-10-CM

## 2019-04-17 DIAGNOSIS — E11.22 TYPE 2 DIABETES MELLITUS WITH STAGE 3 CHRONIC KIDNEY DISEASE, WITH LONG-TERM CURRENT USE OF INSULIN: ICD-10-CM

## 2019-04-17 DIAGNOSIS — N20.0 KIDNEY STONES: Primary | ICD-10-CM

## 2019-04-17 DIAGNOSIS — Z79.4 TYPE 2 DIABETES MELLITUS WITH STAGE 3 CHRONIC KIDNEY DISEASE, WITH LONG-TERM CURRENT USE OF INSULIN: ICD-10-CM

## 2019-04-17 PROCEDURE — 3079F PR MOST RECENT DIASTOLIC BLOOD PRESSURE 80-89 MM HG: ICD-10-PCS | Mod: HCNC,CPTII,S$GLB, | Performed by: INTERNAL MEDICINE

## 2019-04-17 PROCEDURE — 1101F PR PT FALLS ASSESS DOC 0-1 FALLS W/OUT INJ PAST YR: ICD-10-PCS | Mod: HCNC,CPTII,S$GLB, | Performed by: INTERNAL MEDICINE

## 2019-04-17 PROCEDURE — 99999 PR PBB SHADOW E&M-EST. PATIENT-LVL I: ICD-10-PCS | Mod: PBBFAC,HCNC,,

## 2019-04-17 PROCEDURE — 3079F DIAST BP 80-89 MM HG: CPT | Mod: HCNC,CPTII,S$GLB, | Performed by: INTERNAL MEDICINE

## 2019-04-17 PROCEDURE — 92250 DIABETIC EYE SCREENING PHOTO: ICD-10-PCS | Mod: HCNC,S$GLB,, | Performed by: OPHTHALMOLOGY

## 2019-04-17 PROCEDURE — 99999 PR PBB SHADOW E&M-EST. PATIENT-LVL V: CPT | Mod: PBBFAC,HCNC,, | Performed by: INTERNAL MEDICINE

## 2019-04-17 PROCEDURE — 99999 PR PBB SHADOW E&M-EST. PATIENT-LVL I: CPT | Mod: PBBFAC,HCNC,,

## 2019-04-17 PROCEDURE — 1101F PT FALLS ASSESS-DOCD LE1/YR: CPT | Mod: HCNC,CPTII,S$GLB, | Performed by: INTERNAL MEDICINE

## 2019-04-17 PROCEDURE — 99214 OFFICE O/P EST MOD 30 MIN: CPT | Mod: HCNC,S$GLB,, | Performed by: INTERNAL MEDICINE

## 2019-04-17 PROCEDURE — 99214 PR OFFICE/OUTPT VISIT, EST, LEVL IV, 30-39 MIN: ICD-10-PCS | Mod: HCNC,S$GLB,, | Performed by: INTERNAL MEDICINE

## 2019-04-17 PROCEDURE — 92250 FUNDUS PHOTOGRAPHY W/I&R: CPT | Mod: HCNC,S$GLB,, | Performed by: OPHTHALMOLOGY

## 2019-04-17 PROCEDURE — 99999 PR PBB SHADOW E&M-EST. PATIENT-LVL V: ICD-10-PCS | Mod: PBBFAC,HCNC,, | Performed by: INTERNAL MEDICINE

## 2019-04-17 PROCEDURE — 3077F PR MOST RECENT SYSTOLIC BLOOD PRESSURE >= 140 MM HG: ICD-10-PCS | Mod: HCNC,CPTII,S$GLB, | Performed by: INTERNAL MEDICINE

## 2019-04-17 PROCEDURE — 3077F SYST BP >= 140 MM HG: CPT | Mod: HCNC,CPTII,S$GLB, | Performed by: INTERNAL MEDICINE

## 2019-04-17 NOTE — PROGRESS NOTES
HPI     Diabetic Eye Exam      Additional comments: photos           Last edited by Madisyn Mark on 4/17/2019 11:25 AM. (History)            Assessment /Plan     For exam results, see Encounter Report.    Type 2 diabetes mellitus with stage 3 chronic kidney disease, with long-term current use of insulin  -     Diabetic Eye Screening Photo

## 2019-04-17 NOTE — PROGRESS NOTES
Subjective:       Patient ID: Vivienne Jose is a 68 y.o. White female who presents for follow-up evaluation of No chief complaint on file.    HPI This is a 68-year-old white female with history of diabetes, hypertension, nephrolithiasis, and obstructive sleep apnea is coming in for f/u of kidney stones. No recent stones. She states her blood pressures are stable in the 120's-130's/70's-80's and occ higher.  Diabetes labile with a1c of 6.9.  Creatinine stable.  Removed kidney stone in December 2016 with several UTI's -sees urology.   UTI in jan and had R knee replacement in march 2019.     PAST MEDICAL HISTORY: Significant for diabetes, hypertension, obstructive   sleep apnea, hyperlipidemia, frequent UTIs,  lithotripsy, and CAD.     SOCIAL HISTORY: Does not smoke and does not drink. She works as an    at [employer].     FAMILY HISTORY: No family history of kidney stones. Her daughter has   frequent UTIs secondary to reflux.     Review of Systems   Constitutional: Negative for fatigue.        Lost weight.   Eyes: Negative for discharge.   Respiratory: Negative for cough, shortness of breath and wheezing.    Cardiovascular: Negative for chest pain and palpitations.   Gastrointestinal: Negative for abdominal pain, diarrhea, nausea and vomiting.   Genitourinary: Negative for dysuria, frequency, hematuria and urgency.   Skin: Negative for color change and rash.   Psychiatric/Behavioral: Negative for confusion.   All other systems reviewed and are negative.      Objective:      Physical Exam   Constitutional: She is oriented to person, place, and time. She appears well-developed and well-nourished.   HENT:   Right Ear: External ear normal.   Left Ear: External ear normal.   Nose: Nose normal.   Mouth/Throat: Normal dentition.   Eyes: Pupils are equal, round, and reactive to light. Conjunctivae, EOM and lids are normal.   Neck: Trachea normal. No thyroid mass present.   Cardiovascular: Normal rate and  regular rhythm. Exam reveals no friction rub.   No murmur heard.  Pulmonary/Chest: Effort normal and breath sounds normal. No respiratory distress. She has no decreased breath sounds. She has no rales.   Abdominal: Soft. Bowel sounds are normal. She exhibits no mass. There is no tenderness. There is no rebound. No hernia.   Musculoskeletal: She exhibits edema.   Trace edema.  R knee healing well s/p recent surgery.   Neurological: She is alert and oriented to person, place, and time.   Skin: Skin is warm and dry. No rash noted. No erythema.   Psychiatric: She has a normal mood and affect. Judgment normal. Her mood appears not anxious. She does not exhibit a depressed mood.   Oriented to time, place and person.   Vitals reviewed.      Assessment:       No diagnosis found.    Plan:         This is a 68-year-old white female with a history of   diabetes, hypertension and kidney stones is here for f/u.   1. Nephrolithiasis. She has not had any kidney stones in the past 20   years except for a  6 mm stone which was lithotripsied as this was   believed to be the nidus for her frequent UTIs-2011 and one in dec 2016.  Recent urorirsk profile showed low volume-hydrate well. Not hydrating well.  Low magnesium-on magnesium 600 mg a day. Asked the pt to decrease animal protein intake and to decraese salt and to decrease excessive calcium intake.  No stones after recent stone removal-feb 2017 US.  Increase water intake.   2. Hypertension. Blood pressure is stable and was  switched from HCTZ to chlorthalidone. Con't to monitor.  Asked to follow low salt diet.   3. Renal: Her creatinines have been stable.   4. DM; No  proteinuria.  On ARB.  hbg a1c stable.  Increase potassium intake; no alkalosis or acidosis.  Started on Kcl by PCP per the patient a few months ago. No evidence of hyperaldosteronism per labs.  Discussed PPI risks and asked her to discuss with her PCP.     Return in 9 months.

## 2019-04-17 NOTE — PROGRESS NOTES
Last 5 Patient Entered Readings                                      Current 30 Day Average: 137/66     Recent Readings 4/25/2019 4/22/2019 4/21/2019 4/20/2019 4/19/2019    SBP (mmHg) 148 138 140 136 133    DBP (mmHg) 70 63 66 71 65    Pulse 72 73 80 78 69        Ms. Jose's BP has been higher. She has been under stress trying to get her mother in an assisted living. She is trying to all of this by the time she leaves for vacation on June 2. She also has not been able to get her BP monitor to connect to her phone. She will try to reconnect the blue tooth, but if this doesn't work, she will go to the [facility] soon.    Per 30 day average, 137/66 mmHg, patient's BP is not at goal.     Patient denies s/s of hypotension (lightheadedness, dizziness, nausea, fatigue) associated with low readings. Instructed patient to inform me if this occurs, patient confirms understanding.      Patient denies s/s of hypertension (SOB, CP, severe headaches, changes in vision) associated with high readings. Instructed patient to go to the ED if BP > 180/110 and accompanied by hypertensive s/s, patient confirms understanding.    Will continue to monitor regularly. Will follow up in 3-4 weeks, sooner if BP begins to trend upward or downward.    Asked patient to call or message with questions or concerns.     Current HTN regimen:  Hypertension Medications             amLODIPine (NORVASC) 5 MG tablet Take 1 tablet (5 mg total) by mouth once daily.    chlorthalidone (HYGROTEN) 25 MG Tab Take 1 tablet (25 mg total) by mouth once daily.    valsartan (DIOVAN) 160 MG tablet TAKE 1 TABLET TWICE DAILY            Last 6 Patient Entered Readings                                          Most Recent A1c: 6.9% on 2/8/2019  (Goal: 7%)     Recent Readings 4/17/2019 4/16/2019 4/15/2019 4/14/2019 4/13/2019    Blood Glucose (mg/dL) 135 170 176 157 93        Ms. Jose acknowledges spikes in blood sugar. She will work on monitoring carb intake.     I will continue  to monitor regularly and will follow up in 1-2 months, sooner if there are any concerning blood glucose readings.     Asked patient to contact me with any concerns or clinical changes.     Diabetes Medications             glucagon (human recombinant) inj 1mg/mL kit Inject 1 mL (1 mg total) into the muscle as needed.    insulin degludec (TRESIBA FLEXTOUCH U-100) 100 unit/mL (3 mL) InPn Inject 22 Units into the skin every evening.    liraglutide 0.6 mg/0.1 mL, 18 mg/3 mL, subq PNIJ (VICTOZA 3-TAWANA) 0.6 mg/0.1 mL (18 mg/3 mL) PnIj INJECT 1.8 MG INTO THE SKIN ONCE DAILY.    metFORMIN (GLUCOPHAGE) 1000 MG tablet Take 1 tablet (1,000 mg total) by mouth 2 (two) times daily with meals.

## 2019-04-18 ENCOUNTER — CLINICAL SUPPORT (OUTPATIENT)
Dept: REHABILITATION | Facility: HOSPITAL | Age: 69
End: 2019-04-18
Attending: ORTHOPAEDIC SURGERY
Payer: MEDICARE

## 2019-04-18 DIAGNOSIS — M25.661 DECREASED RANGE OF MOTION OF RIGHT KNEE: ICD-10-CM

## 2019-04-18 DIAGNOSIS — R26.89 IMPAIRED GAIT AND MOBILITY: ICD-10-CM

## 2019-04-18 DIAGNOSIS — M62.81 MUSCLE WEAKNESS OF LOWER EXTREMITY: ICD-10-CM

## 2019-04-18 DIAGNOSIS — M25.561 CHRONIC PAIN OF RIGHT KNEE: ICD-10-CM

## 2019-04-18 DIAGNOSIS — G89.29 CHRONIC PAIN OF RIGHT KNEE: ICD-10-CM

## 2019-04-18 PROBLEM — E11.3293 MILD NONPROLIFERATIVE DIABETIC RETINOPATHY OF BOTH EYES WITHOUT MACULAR EDEMA ASSOCIATED WITH TYPE 2 DIABETES MELLITUS: Status: ACTIVE | Noted: 2019-04-18

## 2019-04-18 PROCEDURE — 97110 THERAPEUTIC EXERCISES: CPT | Mod: HCNC,PN

## 2019-04-18 PROCEDURE — 97110 THERAPEUTIC EXERCISES: CPT | Mod: PN

## 2019-04-18 NOTE — PROGRESS NOTES
"  Physical Therapy Daily Treatment Note     Name: Vivienne Jose  Clinic Number: [id number]    Therapy Diagnosis:   No diagnosis found.  Physician: Pj Gamboa MD    Visit Date: 4/18/2019       Physician Orders: PT Eval and Treat   Medical Diagnosis from Referral: Z96.651 (ICD-10-CM) - History of total right knee replacement  Evaluation Date: 4/5/2019  Authorization Period Expiration: 12/31/19  Plan of Care Expiration: 5/17/19  Visit # / Visits authorized: 3/ 50  FOTO: 3/10   PTA visit: 2/6     Time In: 1400  Time Out: 1500  Total Billable Time:  30 minutes (1 MT, 1 TE)     Precautions: Standard      Subjective     Pt reports: she is stiff today.  Denies it as pain.  Relays she had increased pain after last therapy session.  She was compliant with home exercise program.  Response to previous treatment: increased pain that has since subsided  Functional change: none    Pain: 1/10  Location: right knee      Objective     Vivienne received the following manual therapy techniques: Myofacial release and Soft tissue Mobilization were applied to the:right quad, hamstring, gastroc  for 10 minutes    Vivienne received therapeutic exercises to develop strength, ROM and flexibility for 40 minutes including:    Bike x 5'  supervised complete revolutions  Supine:   Quad sets                                   5"x10  TKE                                             2x10 3" hold        Heel slides                                  10"x10 w/straps  Bridges                                        2x10 B w/3" hold  SLR                                             2x10 B      Side Lying:  Hip Abd                                       2x10 R     Seated:  LAQ                                             2x10 B  HSC                                             2x10 GTB     Standing:   Calf stretch                                3x30" on fitter  Hamstring stretch                       3x30" hold on stair R    Vivienne received cold pack for 10 minutes " to (R) knee      Home Exercises Provided and Patient Education Provided     Education provided:   - cont HEP regularly    Written Home Exercises Provided: Patient instructed to cont prior HEP.  Exercises were reviewed and Vivienne was able to demonstrate them prior to the end of the session.  Vivienne demonstrated good  understanding of the education provided.     See EMR under Patient Instructions for exercises provided 4/5/19.      Assessment     Pt tolerates therapy interventions without difficulties or c/o increased pain  Vivienne is progressing well towards her goals.   Pt prognosis is Excellent.     Pt will continue to benefit from skilled outpatient physical therapy to address the deficits listed in the problem list box on initial evaluation, provide pt/family education and to maximize pt's level of independence in the home and community environment.     Pt's spiritual, cultural and educational needs considered and pt agreeable to plan of care and goals.    Anticipated barriers to physical therapy: comorbidities, coping style,  pain tolerance     Goals:   Long Term Goals:  1. Pt will be independent with updated HEP supplement PT in improving functional mobility.  2. Pt will improve (R)  LE strength to at least 90% of (L) LE strength as measured via MicroFet handheld dynamometer in order to improve functional mobility  3. Pt will improve (R) knee AROM to at least 2-115 degrees in order to improve gait and ability to perform ADLs  4. Pt will improve FOTO knee survey score to </= 35% limited in order to demo improved functional mobility  5. Pt will perform TUG in < 10 seconds without AD in order to demo improved gait speed  6. Pt will perform at least 15 sit to stands without UE support on 30 second sit to stand test in order to demo improved ability to perform transfers      Plan     Cont POC to progress towards established goals    Tamy Black, PT

## 2019-04-18 NOTE — PROGRESS NOTES
"  Physical Therapy Daily Treatment Note     Name: Vivienne Jose  Clinic Number: [id number]    Therapy Diagnosis:   Encounter Diagnoses   Name Primary?    Chronic pain of right knee     Decreased range of motion of right knee     Muscle weakness of lower extremity     Impaired gait and mobility      Physician: Pj Gamboa MD    Visit Date: 4/18/2019     Physician Orders: PT Eval and Treat   Medical Diagnosis from Referral: Z96.651 (ICD-10-CM) - History of total right knee replacement  Evaluation Date: 4/5/2019  Authorization Period Expiration: 12/31/19  Plan of Care Expiration: 5/17/2019  Visit # / Visits authorized: 4/50  FOTO: 4/10 NEXT  PTA visit: --    Gcode: 4/10  Visit: 90.96  Total: 383.22     Time In: 1210  Time Out: 1300  Total Billable Time:  40 minutes (TE-3)     Precautions: Standard    Subjective     Pt reports: she is doing well today. Feels like she is improving  She was compliant with home exercise program.  Response to previous treatment: increased pain that has since subsided  Functional change: none    Pain: 0/10  Location: R knee      Objective     Vivienne received therapeutic exercises to develop strength, ROM and flexibility for 40 minutes including:    Bike: 5' full forwards revolutions (not counted)    Supine:   Quad sets: 5"x20  Heel slides: 10"x10 R c/ straps  SLR: 3# 3x10 R     Standing:   Fitter calf stretch: 3x30"   Stair hamstring stretch: 3x30" R   Steamboats: RTB 2x10 B  TKE: orange Cook band 5"x15 R  Step ups: 6" step 2x10 R  Cybex knee extension: 15# 2x10 R  Cybex leg press: 5.0 3x10 double leg; 3.0 2x10 R    Vivienne received cold pack for 10 minutes to (R) knee    Home Exercises Provided and Patient Education Provided     Education provided:   - Continue HEP.    Written Home Exercises Provided: Patient instructed to cont prior HEP.  Exercises were reviewed and Vivienne was able to demonstrate them prior to the end of the session.  Vivienne demonstrated good  understanding of the " education provided.     See EMR under Patient Instructions for exercises provided 4/5/19.      Assessment     Patient tolerated progression of exercises well with no complaints of pain or discomfort.    Vivienne is progressing well towards her goals.   Pt prognosis is Excellent.     Pt will continue to benefit from skilled outpatient physical therapy to address the deficits listed in the problem list box on initial evaluation, provide pt/family education and to maximize pt's level of independence in the home and community environment.     Pt's spiritual, cultural and educational needs considered and pt agreeable to plan of care and goals.    Anticipated barriers to physical therapy: comorbidities, coping style,  pain tolerance     Goals:   Long Term Goals:  1. Pt will be independent with updated HEP supplement PT in improving functional mobility. PROGRESSING, NOT MET 4/18/2019  2. Pt will improve (R)  LE strength to at least 90% of (L) LE strength as measured via MicroFet handheld dynamometer in order to improve functional mobility PROGRESSING, NOT MET 4/18/2019  3. Pt will improve (R) knee AROM to at least 2-115 degrees in order to improve gait and ability to perform ADLs PROGRESSING, NOT MET 4/18/2019  4. Pt will improve FOTO knee survey score to </= 35% limited in order to demo improved functional mobility PROGRESSING, NOT MET 4/18/2019  5. Pt will perform TUG in < 10 seconds without AD in order to demo improved gait speed PROGRESSING, NOT MET 4/18/2019  6. Pt will perform at least 15 sit to stands without UE support on 30 second sit to stand test in order to demo improved ability to perform transfers PROGRESSING, NOT MET 4/18/2019      Plan     Continue plan of care. Step downs next.    Liss Steward, PT

## 2019-04-23 ENCOUNTER — CLINICAL SUPPORT (OUTPATIENT)
Dept: REHABILITATION | Facility: HOSPITAL | Age: 69
End: 2019-04-23
Attending: ORTHOPAEDIC SURGERY
Payer: MEDICARE

## 2019-04-23 ENCOUNTER — OFFICE VISIT (OUTPATIENT)
Dept: ORTHOPEDICS | Facility: CLINIC | Age: 69
End: 2019-04-23
Payer: MEDICARE

## 2019-04-23 ENCOUNTER — DOCUMENTATION ONLY (OUTPATIENT)
Dept: REHABILITATION | Facility: HOSPITAL | Age: 69
End: 2019-04-23

## 2019-04-23 VITALS — HEIGHT: 62 IN | BODY MASS INDEX: 41.59 KG/M2 | WEIGHT: 226 LBS

## 2019-04-23 DIAGNOSIS — R26.89 IMPAIRED GAIT AND MOBILITY: ICD-10-CM

## 2019-04-23 DIAGNOSIS — G89.29 CHRONIC PAIN OF RIGHT KNEE: ICD-10-CM

## 2019-04-23 DIAGNOSIS — M25.661 DECREASED RANGE OF MOTION OF RIGHT KNEE: ICD-10-CM

## 2019-04-23 DIAGNOSIS — M25.561 CHRONIC PAIN OF RIGHT KNEE: ICD-10-CM

## 2019-04-23 DIAGNOSIS — M62.81 MUSCLE WEAKNESS OF LOWER EXTREMITY: ICD-10-CM

## 2019-04-23 DIAGNOSIS — Z96.651 HISTORY OF TOTAL RIGHT KNEE REPLACEMENT: Primary | ICD-10-CM

## 2019-04-23 PROCEDURE — 99999 PR PBB SHADOW E&M-EST. PATIENT-LVL V: CPT | Mod: PBBFAC,HCNC,, | Performed by: ORTHOPAEDIC SURGERY

## 2019-04-23 PROCEDURE — 99024 PR POST-OP FOLLOW-UP VISIT: ICD-10-PCS | Mod: HCNC,S$GLB,, | Performed by: ORTHOPAEDIC SURGERY

## 2019-04-23 PROCEDURE — 97110 THERAPEUTIC EXERCISES: CPT | Mod: HCNC,PN

## 2019-04-23 PROCEDURE — 99999 PR PBB SHADOW E&M-EST. PATIENT-LVL V: ICD-10-PCS | Mod: PBBFAC,HCNC,, | Performed by: ORTHOPAEDIC SURGERY

## 2019-04-23 PROCEDURE — 99024 POSTOP FOLLOW-UP VISIT: CPT | Mod: HCNC,S$GLB,, | Performed by: ORTHOPAEDIC SURGERY

## 2019-04-23 NOTE — PROGRESS NOTES
Subjective:      Patient ID: Vivienne Jose is a 68 y.o. female.    Chief Complaint: Post-op Evaluation (Right pt states stiffness)      HPI: Vivienne Jose is here for postop visit.  She is approximately 6 weeks status post right total knee replacement pulled she is in physical therapy and feels like she is making great progress.  She is ambulating easily and independently. Reports pain is he minimal.  However, she will experience occasional pain at night for which she takes an occasional Percocet.  Otherwise, she has no complaints at this time.      Past Medical History:   Diagnosis Date    Allergy     Angio-edema     Arthritis     Cataract     Cerebrovascular malformation     Colon polyps     Coronary artery disease     Diabetes mellitus, type 2     Diabetic peripheral neuropathy     GERD (gastroesophageal reflux disease)     Herpes infection     Hyperlipidemia     Hypertension     Hypothyroidism     Kidney stones     Mild nonproliferative diabetic retinopathy of 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 edema associated with type 2 diabetes mellitus 4/18/2019    Nausea & vomiting 11/23/2015    Obesity, morbid     Ovarian cyst     Pyelonephritis, chronic     Seizure disorder, focal motor     Sleep apnea     Type II or unspecified type diabetes mellitus with neurological manifestations, uncontrolled(250.62)     Urinary tract infection     Urticaria     Vaginal infection        Current Outpatient Medications:     amLODIPine (NORVASC) 5 MG tablet, Take 1 tablet (5 mg total) by mouth once daily., Disp: 90 tablet, Rfl: 3    amoxicillin (AMOXIL) 500 MG capsule, Take 1 capsule (500 mg total) by mouth 3 (three) times daily. Take 4 tablets 1 hr before invasive dental work, Disp: 12 capsule, Rfl: 0    aspirin (ECOTRIN) 81 MG EC tablet, Take 81 mg by mouth once daily., Disp: , Rfl:     azelastine (ASTELIN) 137 mcg (0.1 %) nasal spray, 2 sprays (274 mcg total) by Nasal route 2 (two) times daily., Disp: 90 mL,  Rfl: 4    biotin 10,000 mcg Cap, Take by mouth., Disp: , Rfl:     blood sugar diagnostic (TRUE METRIX GLUCOSE TEST STRIP) Strp, TEST TWO TIMES DAILY, Disp: 200 strip, Rfl: 6    blood-glucose meter Mis, [employer] True Metrix Air meter, Disp: 1 each, Rfl: 0    calcium carbonate-vitamin D3 600 mg(1,500mg) -100 unit Cap, Take 1 tablet by mouth once daily., Disp: , Rfl:     chlorthalidone (HYGROTEN) 25 MG Tab, Take 1 tablet (25 mg total) by mouth once daily., Disp: 90 tablet, Rfl: 1    econazole nitrate 1 % cream, Mix with hydrocortisone cream 1% 30 grams use bid prn, Disp: 60 g, Rfl: 3    fluticasone (FLONASE) 50 mcg/actuation nasal spray, 2 sprays (100 mcg total) by Each Nare route once daily., Disp: 48 g, Rfl: 4    gabapentin (NEURONTIN) 300 MG capsule, Take 2 capsules (600 mg total) by mouth 2 (two) times daily., Disp: 180 capsule, Rfl: 1    glucagon (human recombinant) inj 1mg/mL kit, Inject 1 mL (1 mg total) into the muscle as needed., Disp: 1 kit, Rfl: 6    insulin degludec (TRESIBA FLEXTOUCH U-100) 100 unit/mL (3 mL) InPn, Inject 22 Units into the skin every evening., Disp: 15 Syringe, Rfl: 4    ketotifen (ZADITOR) 0.025 % (0.035 %) ophthalmic solution, Place 1-2 drops into both eyes once daily., Disp: , Rfl:     L.acid-B.bifidum-B.animal-FOS (PROBIOTIC COMPLEX) 25 billion cell -100 mg Cap, Take 1 capsule by mouth once daily., Disp: , Rfl:     lancets (TRUEPLUS LANCETS) 28 gauge Mis, Inject 1 lancet into the skin 2 (two) times daily before meals., Disp: 200 each, Rfl: 6    levothyroxine (SYNTHROID) 75 MCG tablet, Take 1 tablet (75 mcg total) by mouth once daily., Disp: 90 tablet, Rfl: 3    liraglutide 0.6 mg/0.1 mL, 18 mg/3 mL, subq PNIJ (VICTOZA 3-TAWANA) 0.6 mg/0.1 mL (18 mg/3 mL) PnIj, INJECT 1.8 MG INTO THE SKIN ONCE DAILY., Disp: 27 mL, Rfl: 11    loratadine (CLARITIN) 10 mg tablet, Take 10 mg by mouth once daily., Disp: , Rfl:     lovastatin (MEVACOR) 40 MG tablet, TAKE 1 TABLET NIGHTLY  "(SUBSTITUTED FOR MEVACOR), Disp: 90 tablet, Rfl: 0    magnesium oxide-Mg AA chelate (MAGNESIUM, AMINO ACID CHELATE,) 133 mg Tab, Take by mouth as directed. , Disp: , Rfl:     metFORMIN (GLUCOPHAGE) 1000 MG tablet, Take 1 tablet (1,000 mg total) by mouth 2 (two) times daily with meals., Disp: 180 tablet, Rfl: 3    omeprazole (PRILOSEC) 20 MG capsule, Take 1 capsule (20 mg total) by mouth daily as needed., Disp: 90 capsule, Rfl: 1    pen needle, diabetic (BD ULTRA-FINE EDUARDO PEN NEEDLE) 32 gauge x 5/32" Ndle, USE WITH VICTOZA AND LEVEMIR (2 INJECTIONS EVERY DAY), Disp: 100 each, Rfl: 3    potassium chloride SA (K-DUR,KLOR-CON) 10 MEQ tablet, Take 10 meq PO QD except on Mondays, Wednesdays and Fridays, on which she should take 2 tabs (20 meq) QD, Disp: 145 tablet, Rfl: 1    PREMARIN vaginal cream, Place 0.5 g vaginally 3 (three) times a week., Disp: 30 g, Rfl: 3    valACYclovir (VALTREX) 500 MG tablet, TAKE 1 TABLET EVERY DAY, Disp: 90 tablet, Rfl: 0    valsartan (DIOVAN) 160 MG tablet, TAKE 1 TABLET TWICE DAILY, Disp: 180 tablet, Rfl: 0    diclofenac sodium (VOLTAREN) 1 % Gel, Apply 2 g topically 4 (four) times daily., Disp: 3 Tube, Rfl: 3    Current Facility-Administered Medications:     Allergy Mix, , Subcutaneous, 1 time in Clinic/HOD, Ailin Mills MD    Allergy Mix, , Subcutaneous, 1 time in Clinic/HOD, Ailin Mills MD  Review of patient's allergies indicates:   Allergen Reactions    Sulfa (sulfonamide antibiotics) Nausea Only    Ciprofloxacin     Januvia [sitagliptin]      abd pain    Lisinopril     Lotensin [benazepril]     Nexium [esomeprazole magnesium] Other (See Comments)     Gas       Ht 5' 2" (1.575 m)   Wt 102.5 kg (226 lb)   LMP  (LMP Unknown)   BMI 41.34 kg/m²     Review of Systems   Constitution: Negative for chills and fever.   Cardiovascular: Negative for chest pain and palpitations.   Respiratory: Negative for shortness of breath and wheezing.    Skin: Negative for " poor wound healing and rash.   Musculoskeletal: Positive for joint pain (Minimal), joint swelling and stiffness. Negative for falls.   Gastrointestinal: Negative for nausea and vomiting.   Genitourinary: Negative for dysuria and hematuria.   Neurological: Negative for seizures and tremors.   Psychiatric/Behavioral: Negative for altered mental status.   Allergic/Immunologic: Negative for environmental allergies and persistent infections.         Objective:    Ortho Exam   Left KNEE:  The patient walks with nonantalgic gait.  Resisted SLR negative.  The skin over the knee is significant for healed incision.  Knee effusion none.  Tendernes is located none.  Range of motion- Flexion 125 deg, Extension 0 deg,   Ligament exam:              MCL intact              Lachman intact              Post sag intact              LCL intact  Patellar crepitation absent.  Pulses DP present, PT present  Motor normal 5/5  strength in all tested muscle groups.   Sensory normal  GEN: Well developed, well nourished female. AAOX3. No acute distress.   Normocephalic, atraumatic.   SANCHEZ  Breathing unlabored.  Mood and affect appropriate.           Assessment:     Imaging:  No new imaging.        1. History of total right knee replacement          Plan:           Patient doing very well postoperatively.  Continue physical therapy.  We did discuss adding amitriptyline for nighttime pain and discontinuing Percocet.  Patient states she only occasionally takes Percocet fro night pain and still has plenty from her original prescription.  If night pain persist in the future, we will consider adding amitriptyline.    Follow up in about 6 weeks (around 6/4/2019).

## 2019-04-23 NOTE — LETTER
April 23, 2019      [facility]  [address] [place] [address]  [place] [address]  Phone: [phone]       Patient: Vivienne Jose   YOB: 1950  Date of Visit: 04/23/2019    To Whom It May Concern:    Carine Jose  was at [facility] on 04/23/2019. She may return to work/school on 04/29/19 with no restrictions. If you have any questions or concerns, or if I can be of further assistance, please do not hesitate to contact me.    Sincerely,    Rosa Ham PA-C

## 2019-04-23 NOTE — PROGRESS NOTES
"  Physical Therapy Daily Treatment Note     Name: Vivienne Jose  Clinic Number: [id number]    Therapy Diagnosis:   Encounter Diagnoses   Name Primary?    Chronic pain of right knee     Decreased range of motion of right knee     Muscle weakness of lower extremity     Impaired gait and mobility      Physician: Pj Gamboa MD    Visit Date: 4/23/2019    Physician Orders: PT Eval and Treat   Medical Diagnosis from Referral: Z96.651 (ICD-10-CM) - History of total right knee replacement  Evaluation Date: 4/5/2019  Authorization Period Expiration: 12/31/19  Plan of Care Expiration: 5/17/2019  Visit # / Visits authorized: 5/50  FOTO: 5/10 NEXT      Gcode: 5/10  Visit: 60.64  Total: 443.86    Time In: 10:07 am   Time Out: 11:13 am   Total Billable Time: 33 minutes    Precautions: Standard    Subjective     Pt reports: that she experiences a little stiffness/discomfort in (R) knee. Pt stated that every now and then will get a "ping" in her (R) knee, but thinks is due to nerves coming back.   She was not compliant with home exercise program.  Response to previous treatment: no adverse effects  Functional change: none    Pain: 0/10  Location: right knee      Objective       Vivienne received therapeutic exercises to develop strength, ROM and flexibility for 25 minutes supervised and x31' 1:1 with PT Including below:     Bike: 5' full forwards revolutions     Supine:   Quad sets: 5"x20  Heel slides: 10"x10 R c/ straps  SLR: 3# 3x10 R     Standing:   Fitter calf stretch: 3x30"   Stair hamstring stretch: 3x30" R   Steamboats: RTB 2x10 B  TKE: orange Cook band 5"x15 R  Step ups: 6" step 2x10 R  Cybex knee extension: 15# 2x10   Cybex leg press: 5.0 3x10 double leg; 3.0 2x10 R  Hamstring curl machine 3.0 3x10     (R) knee AROM: 4-108 degrees    Vivienne received cold pack for 10 minutes to (R) knee    Home Exercises Provided and Patient Education Provided     Education provided:   - Continue with HEP     Written Home Exercises " Provided: Patient instructed to cont prior HEP.  Exercises were reviewed and Vivienne was able to demonstrate them prior to the end of the session.  Vivienne demonstrated good  understanding of the education provided.     See EMR under Patient Instructions for exercises provided 4/5/19.      Assessment     Pt demo improved (R) knee flexion and extension AROM compared to measurements taken on initial evaluation. Pt was able to tolerate all therex including addition of hamstring curl machine today without c/o increased pain during or after therapy session.     Viveinne is progressing well towards her goals.   Pt prognosis is Good.     Pt will continue to benefit from skilled outpatient physical therapy to address the deficits listed in the problem list box on initial evaluation, provide pt/family education and to maximize pt's level of independence in the home and community environment.     Pt's spiritual, cultural and educational needs considered and pt agreeable to plan of care and goals.    Anticipated barriers to physical therapy: comorbidities, coping style,  pain tolerance     Goals:     Long Term Goals:  1. Pt will be independent with updated HEP supplement PT in improving functional mobility. PROGRESSING, NOT MET 4/18/2019  2. Pt will improve (R)  LE strength to at least 90% of (L) LE strength as measured via MicroFet handheld dynamometer in order to improve functional mobility PROGRESSING, NOT MET 4/18/2019  3. Pt will improve (R) knee AROM to at least 2-115 degrees in order to improve gait and ability to perform ADLs PROGRESSING, NOT MET 4/18/2019  4. Pt will improve FOTO knee survey score to </= 35% limited in order to demo improved functional mobility PROGRESSING, NOT MET 4/18/2019  5. Pt will perform TUG in < 10 seconds without AD in order to demo improved gait speed PROGRESSING, NOT MET 4/18/2019  6. Pt will perform at least 15 sit to stands without UE support on 30 second sit to stand test in order to demo  improved ability to perform transfers PROGRESSING, NOT MET 4/18/2019      Plan     Continue per POC, progress as tolerated    Abby Fernandez, PT

## 2019-04-23 NOTE — PROGRESS NOTES
Face to Face PTA Conference performed with Mone Hudson PTA and Chirag Dash PTA regarding patient's current status, overall progress, and plan of care.     Abby Fernandez, PT    Mone Hudson, PTA     Chirag Dash, PTA

## 2019-04-25 ENCOUNTER — CLINICAL SUPPORT (OUTPATIENT)
Dept: REHABILITATION | Facility: HOSPITAL | Age: 69
End: 2019-04-25
Attending: ORTHOPAEDIC SURGERY
Payer: MEDICARE

## 2019-04-25 DIAGNOSIS — M25.561 CHRONIC PAIN OF RIGHT KNEE: ICD-10-CM

## 2019-04-25 DIAGNOSIS — R26.89 IMPAIRED GAIT AND MOBILITY: ICD-10-CM

## 2019-04-25 DIAGNOSIS — M62.81 MUSCLE WEAKNESS OF LOWER EXTREMITY: ICD-10-CM

## 2019-04-25 DIAGNOSIS — G89.29 CHRONIC PAIN OF RIGHT KNEE: ICD-10-CM

## 2019-04-25 DIAGNOSIS — M25.661 DECREASED RANGE OF MOTION OF RIGHT KNEE: ICD-10-CM

## 2019-04-25 PROCEDURE — 97110 THERAPEUTIC EXERCISES: CPT | Mod: HCNC,PN

## 2019-04-25 NOTE — PROGRESS NOTES
Physical Therapy Daily Treatment Note     Name: Vivienne Jose  Clinic Number: [id number]    Therapy Diagnosis:   Encounter Diagnoses   Name Primary?    Chronic pain of right knee     Decreased range of motion of right knee     Muscle weakness of lower extremity     Impaired gait and mobility      Physician: Pj Gamboa MD    Visit Date: 2019    Physician Orders: PT Eval and Treat   Medical Diagnosis from Referral: Z96.651 (ICD-10-CM) - History of total right knee replacement  Evaluation Date: 2019  Authorization Period Expiration: 19  Plan of Care Expiration: 2019  Visit # / Visits authorized:   FOTO: 6/10 done  Gcode: 6/10  Visit: 60.64  Total: 504.5    Time In: 1005  Time Out: 1100   Total Billable Time: 25 minutes    Precautions: Standard    Subjective     Pt reports: main complaints of stiffness and soreness. Patient feels like she no longer needs therapy; not very active and has no functional limitations due to R knee  She was not compliant with home exercise program.  Response to previous treatment: decreased pain  Functional change: no functional limitations    Pain: 0/10  Location: R knee      Objective     Vivienne received therapeutic exercises to develop strength, ROM and flexibility for 30 minutes supervised and 25 minutes 1:1 with PT Including below:     AROM: 3-117 degrees R  PROM: 1-121 degrees R    L/E Strength w/ MicroFET Muscle Roxanne Dynamometer Right Left Pain/Dysfunction with Movement   (approx 4 sec hold w/ max contraction)   Hip Flexion 21.9 kg  19.5 kg     Hip Abduction 30.1 kg  24.9 kg     Quadriceps 21 kg  21.5 kg     Hamstrings 19.6 kg  18.2 kg       TU seconds (w/o assistive device)  30 second sit-to-stand test (without U/E support): 14  CMS Impairment/Limitation/Restriction for FOTO Knee Survey    Therapist reviewed FOTO scores for Vivienne Jose on 2019.   FOTO documents entered into [employer] - see Media section.    Limitation Score: 40%  Category:  "Mobility    Goal: CJ = at least 20% but < 40% impaired, limited or restricted   Discharge: CK = at least 40% but < 60% impaired, limited or restricted     Bike: 5' full forwards revolutions     Supine:   Quad sets: 5"x20  Heel slides: 10"x10 R c/ straps  SLR: 3# 3x10 R     Standing:   Squats to chair: 2x10  Lateral lunges: 2x10 B  Forward lunges: 2x10 B    Home Exercises Provided and Patient Education Provided     Education provided:   - Patient given updated HEP including hamstring and gastroc stretches, steamboats, forward step ups and downs, forward and lateral lunges, 9090 squats.     Written Home Exercises Provided: Yes  Exercises were reviewed and Vivienne was able to demonstrate them prior to the end of the session.  Vivienne demonstrated good  understanding of the education provided.     See EMR under Patient Instructions for exercises provided 04/25/2019     Assessment     Since beginning PT, pt has been seen 6 times since initial evaluation on 4/5/2019. Overall, she has made good, steady progress with her PT treatments and has worked hard towards all of her PT goals as evidenced by subjective and objective improvements. Since attending PT she has reached most of her PT goals. She will be discharged with an updated HEP and was instructed to contact us with any other questions or concerns. Pt agreeable to d/c.    Goals:   Long Term Goals:  1. Pt will be independent with updated HEP supplement PT in improving functional mobility. MET 04/25/2019  2. Pt will improve (R)  LE strength to at least 90% of (L) LE strength as measured via MicroFet handheld dynamometer in order to improve functional mobility MET 04/25/2019   3. Pt will improve (R) knee AROM to at least 2-115 degrees in order to improve gait and ability to perform ADLs MET 04/25/2019   4. Pt will improve FOTO knee survey score to </= 35% limited in order to demo improved functional mobility NOT MET 04/25/2019   5. Pt will perform TUG in < 10 seconds without " AD in order to demo improved gait speed MET 04/25/2019   6. Pt will perform at least 15 sit to stands without UE support on 30 second sit to stand test in order to demo improved ability to perform transfers NOT MET 04/25/2019     Plan     Discharge from physical therapy.    Tamy Black, PT

## 2019-04-26 ENCOUNTER — PES CALL (OUTPATIENT)
Dept: ADMINISTRATIVE | Facility: CLINIC | Age: 69
End: 2019-04-26

## 2019-04-27 DIAGNOSIS — E78.5 HYPERLIPIDEMIA, UNSPECIFIED HYPERLIPIDEMIA TYPE: ICD-10-CM

## 2019-04-29 RX ORDER — LOVASTATIN 40 MG/1
TABLET ORAL
Qty: 90 TABLET | Refills: 0 | Status: SHIPPED | OUTPATIENT
Start: 2019-04-29 | End: 2019-07-22 | Stop reason: SDUPTHER

## 2019-04-30 ENCOUNTER — OFFICE VISIT (OUTPATIENT)
Dept: URGENT CARE | Facility: CLINIC | Age: 69
End: 2019-04-30
Payer: MEDICARE

## 2019-04-30 VITALS
WEIGHT: 226 LBS | RESPIRATION RATE: 16 BRPM | HEART RATE: 77 BPM | TEMPERATURE: 98 F | DIASTOLIC BLOOD PRESSURE: 71 MMHG | SYSTOLIC BLOOD PRESSURE: 146 MMHG | OXYGEN SATURATION: 98 % | HEIGHT: 62 IN | BODY MASS INDEX: 41.59 KG/M2

## 2019-04-30 DIAGNOSIS — R30.0 DYSURIA: Primary | ICD-10-CM

## 2019-04-30 DIAGNOSIS — N39.0 URINARY TRACT INFECTION WITHOUT HEMATURIA, SITE UNSPECIFIED: ICD-10-CM

## 2019-04-30 LAB
BILIRUB UR QL STRIP: NEGATIVE
GLUCOSE UR QL STRIP: NEGATIVE
KETONES UR QL STRIP: NEGATIVE
LEUKOCYTE ESTERASE UR QL STRIP: POSITIVE
PH, POC UA: 7.5 (ref 5–8)
POC BLOOD, URINE: NEGATIVE
POC NITRATES, URINE: NEGATIVE
PROT UR QL STRIP: NEGATIVE
SP GR UR STRIP: 1.01 (ref 1–1.03)
UROBILINOGEN UR STRIP-ACNC: NORMAL (ref 0.1–1.1)

## 2019-04-30 PROCEDURE — 81003 POCT URINALYSIS, DIPSTICK, AUTOMATED, W/O SCOPE: ICD-10-PCS | Mod: QW,S$GLB,, | Performed by: NURSE PRACTITIONER

## 2019-04-30 PROCEDURE — 3077F SYST BP >= 140 MM HG: CPT | Mod: CPTII,S$GLB,, | Performed by: NURSE PRACTITIONER

## 2019-04-30 PROCEDURE — 1101F PT FALLS ASSESS-DOCD LE1/YR: CPT | Mod: CPTII,S$GLB,, | Performed by: NURSE PRACTITIONER

## 2019-04-30 PROCEDURE — 81003 URINALYSIS AUTO W/O SCOPE: CPT | Mod: QW,S$GLB,, | Performed by: NURSE PRACTITIONER

## 2019-04-30 PROCEDURE — 1101F PR PT FALLS ASSESS DOC 0-1 FALLS W/OUT INJ PAST YR: ICD-10-PCS | Mod: CPTII,S$GLB,, | Performed by: NURSE PRACTITIONER

## 2019-04-30 PROCEDURE — 3077F PR MOST RECENT SYSTOLIC BLOOD PRESSURE >= 140 MM HG: ICD-10-PCS | Mod: CPTII,S$GLB,, | Performed by: NURSE PRACTITIONER

## 2019-04-30 PROCEDURE — 3078F PR MOST RECENT DIASTOLIC BLOOD PRESSURE < 80 MM HG: ICD-10-PCS | Mod: CPTII,S$GLB,, | Performed by: NURSE PRACTITIONER

## 2019-04-30 PROCEDURE — 99214 OFFICE O/P EST MOD 30 MIN: CPT | Mod: 25,S$GLB,, | Performed by: NURSE PRACTITIONER

## 2019-04-30 PROCEDURE — 99214 PR OFFICE/OUTPT VISIT, EST, LEVL IV, 30-39 MIN: ICD-10-PCS | Mod: 25,S$GLB,, | Performed by: NURSE PRACTITIONER

## 2019-04-30 PROCEDURE — 3078F DIAST BP <80 MM HG: CPT | Mod: CPTII,S$GLB,, | Performed by: NURSE PRACTITIONER

## 2019-04-30 RX ORDER — POTASSIUM &MAGNESIUM ASPARTATE 250-250 MG
CAPSULE ORAL
COMMUNITY
End: 2022-01-14

## 2019-04-30 RX ORDER — AMOXICILLIN AND CLAVULANATE POTASSIUM 875; 125 MG/1; MG/1
1 TABLET, FILM COATED ORAL 2 TIMES DAILY
Qty: 14 TABLET | Refills: 0 | Status: SHIPPED | OUTPATIENT
Start: 2019-04-30 | End: 2019-05-07

## 2019-04-30 RX ORDER — OXYCODONE AND ACETAMINOPHEN 7.5; 325 MG/1; MG/1
1 TABLET ORAL EVERY 4 HOURS PRN
COMMUNITY
End: 2019-07-25

## 2019-04-30 NOTE — PROGRESS NOTES
"Subjective:       Patient ID: Vivienne Jose is a 68 y.o. female.    Vitals:  height is 5' 2" (1.575 m) and weight is 102.5 kg (226 lb). Her oral temperature is 98.2 °F (36.8 °C). Her blood pressure is 146/71 (abnormal) and her pulse is 77. Her respiration is 16 and oxygen saturation is 98%.     Chief Complaint: Dysuria    Vivienne Jose 68 y.o. Female with history of recurrent UTIs.  She reports that she has had UTIs since she was a child.  Review of Urine cultures show pan sensitive bacteria.      Dysuria    This is a new problem. The current episode started acute onset (3 days). The problem occurs every urination. The problem has been gradually worsening. The quality of the pain is described as burning. The pain is at a severity of 2/10. The pain is mild. There has been no fever. She is not sexually active. There is a history of pyelonephritis. Associated symptoms include frequency. Pertinent negatives include no chills, hematuria, nausea, urgency, vomiting or rash. She has tried increased fluids for the symptoms. The treatment provided mild relief. Her past medical history is significant for diabetes mellitus and kidney stones.       Constitution: Negative for chills and fever.   Neck: Negative for painful lymph nodes.   Gastrointestinal: Negative for abdominal pain, nausea and vomiting.   Genitourinary: Positive for dysuria, frequency and vaginal odor. Negative for urgency, urine decreased, hematuria, history of kidney stones, painful menstruation, irregular menstruation, missed menses, heavy menstrual bleeding, ovarian cysts, genital trauma, vaginal pain, vaginal discharge, vaginal bleeding, painful intercourse, genital sore, painful ejaculation and pelvic pain.   Musculoskeletal: Positive for back pain.   Skin: Negative for rash and lesion.   Hematologic/Lymphatic: Negative for swollen lymph nodes.       Objective:      Physical Exam   Constitutional: She is oriented to person, place, and time. She appears " well-developed and well-nourished.   HENT:   Head: Normocephalic and atraumatic.   Right Ear: External ear normal.   Left Ear: External ear normal.   Nose: Nose normal.   Mouth/Throat: Oropharynx is clear and moist.   Eyes: Pupils are equal, round, and reactive to light. EOM are normal.   Neck: Normal range of motion. Neck supple.   Cardiovascular: Normal rate and regular rhythm.   Pulses:       Radial pulses are 2+ on the right side, and 2+ on the left side.   Pulmonary/Chest: Effort normal and breath sounds normal. No stridor. No respiratory distress. She has no wheezes.   Abdominal: Soft. Normal appearance and bowel sounds are normal. She exhibits no distension. There is no tenderness. There is no CVA tenderness.   Musculoskeletal:        Arms:  Neurological: She is alert and oriented to person, place, and time. She exhibits normal muscle tone. Coordination and gait normal.   Skin: Skin is warm, dry and intact. No rash noted.   Psychiatric: She has a normal mood and affect. Her speech is normal and behavior is normal.   Nursing note and vitals reviewed.      Assessment:       1. Dysuria    2. Urinary tract infection without hematuria, site unspecified        Plan:         Dysuria  -     POCT Urinalysis, Dipstick, Automated, W/O Scope    Urinary tract infection without hematuria, site unspecified    Other orders  -     amoxicillin-clavulanate 875-125mg (AUGMENTIN) 875-125 mg per tablet; Take 1 tablet by mouth 2 (two) times daily. for 7 days  Dispense: 14 tablet; Refill: 0

## 2019-04-30 NOTE — PATIENT INSTRUCTIONS
Return to Urgent Care or go to ER if symptoms worsen or fail to improve.  Follow up with PCP as recommended for further management.     Urinary Tract Infections in Women    Urinary tract infections (UTIs) are most often caused by bacteria (germs). These bacteria enter the urinary tract. The bacteria may come from outside the body. Or they may travel from the skin outside the rectum or vagina into the urethra. Female anatomy makes it easy for bacteria from the bowel to enter a womans urinary tract, which is the most common source of UTI. This means women develop UTIs more often than men. Pain in or around the urinary tract is a common UTI symptom. But the only way to know for sure if you have a UTI for the healthcare provider to test your urine. The two tests that may be done are the urinalysis and urine culture.  Types of UTIs  · Cystitis: A bladder infection (cystitis) is the most common UTI in women. You may have urgent or frequent urination. You may also have pain, burning when you urinate, and bloody urine.  · Urethritis: This is an inflamed urethra, which is the tube that carries urine from the bladder to outside the body. You may have lower stomach or back pain. You may also have urgent or frequent urination.  · Pyelonephritis: This is a kidney infection. If not treated, it can be serious and damage your kidneys. In severe cases, you may be hospitalized. You may have a fever and lower back pain.  Medicines to treat a UTI  Most UTIs are treated with antibiotics. These kill the bacteria. The length of time you need to take them depends on the type of infection. It may be as short as 3 days. If you have repeated UTIs, a low-dose antibiotic may be needed for several months. Take antibiotics exactly as directed. Dont stop taking them until all of the medicine is gone. If you stop taking the antibiotic too soon, the infection may not go away, and you may develop a resistance to the antibiotic. This can make it  much harder to treat.  Lifestyle changes to treat and prevent UTIs  The lifestyle changes below will help get rid of your UTI. They may also help prevent future UTIs.  · Drink plenty of fluids. This includes water, juice, or other caffeine-free drinks. Fluids help flush bacteria out of your body.  · Empty your bladder. Always empty your bladder when you feel the urge to urinate. And always urinate before going to sleep. Urine that stays in your bladder can lead to infection. Try to urinate before and after sex as well.  · Practice good personal hygiene. Wipe yourself from front to back after using the toilet. This helps keep bacteria from getting into the urethra.  · Use condoms during sex. These help prevent UTIs caused by sexually transmitted bacteria. Also, avoid using spermicides during sex. These can increase the risk of UTIs. Choose other forms of birth control instead. For women who tend to get UTIs after sex, a low-dose of a preventive antibiotic may be used. Be sure to discuss this option with your healthcare provider.  · Follow up with your healthcare provider as directed. He or she may test to make sure the infection has cleared. If needed, more treatment may be started.  Date Last Reviewed: 1/1/2017  © 1383-7451 [employer]. [address] [place] [address], [place] [address]. All rights reserved. This information is not intended as a substitute for professional medical care. Always follow your healthcare professional's instructions.

## 2019-05-01 ENCOUNTER — TELEPHONE (OUTPATIENT)
Dept: OPHTHALMOLOGY | Facility: CLINIC | Age: 69
End: 2019-05-01

## 2019-05-01 NOTE — TELEPHONE ENCOUNTER
Called patient regarding diabetic eye screening ; gave patient her results she have an appointment coming up      ----- Message from Little Guidry MA sent at 4/30/2019 11:19 AM CDT -----      ----- Message -----  From: Jt Torres MD  Sent: 4/18/2019   1:45 PM  To: Geoffrey Loza

## 2019-05-03 ENCOUNTER — CLINICAL SUPPORT (OUTPATIENT)
Dept: INTERNAL MEDICINE | Facility: CLINIC | Age: 69
End: 2019-05-03
Payer: MEDICARE

## 2019-05-03 DIAGNOSIS — J30.9 CHRONIC ALLERGIC RHINITIS: ICD-10-CM

## 2019-05-03 PROCEDURE — 95115 PR IMMUNOTHERAPY, ONE INJECTION: ICD-10-PCS | Mod: HCNC,S$GLB,, | Performed by: FAMILY MEDICINE

## 2019-05-03 PROCEDURE — 99499 NO LOS: ICD-10-PCS | Mod: HCNC,S$GLB,, | Performed by: ALLERGY & IMMUNOLOGY

## 2019-05-03 PROCEDURE — 99499 UNLISTED E&M SERVICE: CPT | Mod: HCNC,S$GLB,, | Performed by: ALLERGY & IMMUNOLOGY

## 2019-05-03 PROCEDURE — 95115 IMMUNOTHERAPY ONE INJECTION: CPT | Mod: HCNC,S$GLB,, | Performed by: FAMILY MEDICINE

## 2019-05-06 ENCOUNTER — PATIENT MESSAGE (OUTPATIENT)
Dept: ADMINISTRATIVE | Facility: OTHER | Age: 69
End: 2019-05-06

## 2019-05-09 ENCOUNTER — LAB VISIT (OUTPATIENT)
Dept: LAB | Facility: HOSPITAL | Age: 69
End: 2019-05-09
Attending: INTERNAL MEDICINE
Payer: MEDICARE

## 2019-05-09 DIAGNOSIS — E78.5 HYPERLIPIDEMIA, UNSPECIFIED HYPERLIPIDEMIA TYPE: ICD-10-CM

## 2019-05-09 DIAGNOSIS — E11.59 HYPERTENSION ASSOCIATED WITH DIABETES: ICD-10-CM

## 2019-05-09 DIAGNOSIS — Z79.4 TYPE 2 DIABETES MELLITUS WITH STAGE 3 CHRONIC KIDNEY DISEASE, WITH LONG-TERM CURRENT USE OF INSULIN: ICD-10-CM

## 2019-05-09 DIAGNOSIS — E11.22 TYPE 2 DIABETES MELLITUS WITH STAGE 3 CHRONIC KIDNEY DISEASE, WITH LONG-TERM CURRENT USE OF INSULIN: ICD-10-CM

## 2019-05-09 DIAGNOSIS — N18.30 TYPE 2 DIABETES MELLITUS WITH STAGE 3 CHRONIC KIDNEY DISEASE, WITH LONG-TERM CURRENT USE OF INSULIN: ICD-10-CM

## 2019-05-09 DIAGNOSIS — Z00.00 PREVENTATIVE HEALTH CARE: ICD-10-CM

## 2019-05-09 DIAGNOSIS — E03.9 HYPOTHYROIDISM, UNSPECIFIED TYPE: ICD-10-CM

## 2019-05-09 DIAGNOSIS — I15.2 HYPERTENSION ASSOCIATED WITH DIABETES: ICD-10-CM

## 2019-05-09 LAB
ALBUMIN SERPL BCP-MCNC: 3.3 G/DL (ref 3.5–5.2)
ALP SERPL-CCNC: 79 U/L (ref 55–135)
ALT SERPL W/O P-5'-P-CCNC: 16 U/L (ref 10–44)
ANION GAP SERPL CALC-SCNC: 10 MMOL/L (ref 8–16)
AST SERPL-CCNC: 20 U/L (ref 10–40)
BASOPHILS # BLD AUTO: 0.05 K/UL (ref 0–0.2)
BASOPHILS NFR BLD: 0.5 % (ref 0–1.9)
BILIRUB SERPL-MCNC: 0.4 MG/DL (ref 0.1–1)
BUN SERPL-MCNC: 13 MG/DL (ref 8–23)
CALCIUM SERPL-MCNC: 9.8 MG/DL (ref 8.7–10.5)
CHLORIDE SERPL-SCNC: 101 MMOL/L (ref 95–110)
CHOLEST SERPL-MCNC: 147 MG/DL (ref 120–199)
CHOLEST/HDLC SERPL: 3 {RATIO} (ref 2–5)
CO2 SERPL-SCNC: 29 MMOL/L (ref 23–29)
CREAT SERPL-MCNC: 0.8 MG/DL (ref 0.5–1.4)
DIFFERENTIAL METHOD: ABNORMAL
EOSINOPHIL # BLD AUTO: 0.8 K/UL (ref 0–0.5)
EOSINOPHIL NFR BLD: 7.3 % (ref 0–8)
ERYTHROCYTE [DISTWIDTH] IN BLOOD BY AUTOMATED COUNT: 14.3 % (ref 11.5–14.5)
EST. GFR  (AFRICAN AMERICAN): >60 ML/MIN/1.73 M^2
EST. GFR  (NON AFRICAN AMERICAN): >60 ML/MIN/1.73 M^2
ESTIMATED AVG GLUCOSE: 134 MG/DL (ref 68–131)
GLUCOSE SERPL-MCNC: 105 MG/DL (ref 70–110)
HBA1C MFR BLD HPLC: 6.3 % (ref 4–5.6)
HCT VFR BLD AUTO: 41.4 % (ref 37–48.5)
HDLC SERPL-MCNC: 49 MG/DL (ref 40–75)
HDLC SERPL: 33.3 % (ref 20–50)
HGB BLD-MCNC: 13.2 G/DL (ref 12–16)
IMM GRANULOCYTES # BLD AUTO: 0.05 K/UL (ref 0–0.04)
IMM GRANULOCYTES NFR BLD AUTO: 0.5 % (ref 0–0.5)
LDLC SERPL CALC-MCNC: 78.8 MG/DL (ref 63–159)
LYMPHOCYTES # BLD AUTO: 2.6 K/UL (ref 1–4.8)
LYMPHOCYTES NFR BLD: 24.6 % (ref 18–48)
MCH RBC QN AUTO: 30.3 PG (ref 27–31)
MCHC RBC AUTO-ENTMCNC: 31.9 G/DL (ref 32–36)
MCV RBC AUTO: 95 FL (ref 82–98)
MONOCYTES # BLD AUTO: 0.9 K/UL (ref 0.3–1)
MONOCYTES NFR BLD: 8.6 % (ref 4–15)
NEUTROPHILS # BLD AUTO: 6.1 K/UL (ref 1.8–7.7)
NEUTROPHILS NFR BLD: 58.5 % (ref 38–73)
NONHDLC SERPL-MCNC: 98 MG/DL
NRBC BLD-RTO: 0 /100 WBC
PLATELET # BLD AUTO: 338 K/UL (ref 150–350)
PMV BLD AUTO: 9.9 FL (ref 9.2–12.9)
POTASSIUM SERPL-SCNC: 3.6 MMOL/L (ref 3.5–5.1)
PROT SERPL-MCNC: 7.6 G/DL (ref 6–8.4)
RBC # BLD AUTO: 4.36 M/UL (ref 4–5.4)
SODIUM SERPL-SCNC: 140 MMOL/L (ref 136–145)
TRIGL SERPL-MCNC: 96 MG/DL (ref 30–150)
TSH SERPL DL<=0.005 MIU/L-ACNC: 1.34 UIU/ML (ref 0.4–4)
WBC # BLD AUTO: 10.46 K/UL (ref 3.9–12.7)

## 2019-05-09 PROCEDURE — 80053 COMPREHEN METABOLIC PANEL: CPT | Mod: HCNC

## 2019-05-09 PROCEDURE — 80061 LIPID PANEL: CPT | Mod: HCNC

## 2019-05-09 PROCEDURE — 84443 ASSAY THYROID STIM HORMONE: CPT | Mod: HCNC

## 2019-05-09 PROCEDURE — 36415 COLL VENOUS BLD VENIPUNCTURE: CPT | Mod: HCNC,PO

## 2019-05-09 PROCEDURE — 83036 HEMOGLOBIN GLYCOSYLATED A1C: CPT | Mod: HCNC

## 2019-05-09 PROCEDURE — 85025 COMPLETE CBC W/AUTO DIFF WBC: CPT | Mod: HCNC

## 2019-05-10 ENCOUNTER — OFFICE VISIT (OUTPATIENT)
Dept: OPTOMETRY | Facility: CLINIC | Age: 69
End: 2019-05-10
Payer: MEDICARE

## 2019-05-10 DIAGNOSIS — H52.203 MYOPIA WITH ASTIGMATISM AND PRESBYOPIA, BILATERAL: ICD-10-CM

## 2019-05-10 DIAGNOSIS — H52.4 MYOPIA WITH ASTIGMATISM AND PRESBYOPIA, BILATERAL: ICD-10-CM

## 2019-05-10 DIAGNOSIS — H25.13 NUCLEAR SCLEROSIS, BILATERAL: ICD-10-CM

## 2019-05-10 DIAGNOSIS — E11.9 TYPE 2 DIABETES MELLITUS WITHOUT RETINOPATHY: Primary | ICD-10-CM

## 2019-05-10 DIAGNOSIS — H52.13 MYOPIA WITH ASTIGMATISM AND PRESBYOPIA, BILATERAL: ICD-10-CM

## 2019-05-10 PROCEDURE — 99999 PR PBB SHADOW E&M-EST. PATIENT-LVL II: CPT | Mod: PBBFAC,HCNC,, | Performed by: OPTOMETRIST

## 2019-05-10 PROCEDURE — 92014 PR EYE EXAM, EST PATIENT,COMPREHESV: ICD-10-PCS | Mod: HCNC,S$GLB,, | Performed by: OPTOMETRIST

## 2019-05-10 PROCEDURE — 99999 PR PBB SHADOW E&M-EST. PATIENT-LVL II: ICD-10-PCS | Mod: PBBFAC,HCNC,, | Performed by: OPTOMETRIST

## 2019-05-10 PROCEDURE — 92014 COMPRE OPH EXAM EST PT 1/>: CPT | Mod: HCNC,S$GLB,, | Performed by: OPTOMETRIST

## 2019-05-10 NOTE — PROGRESS NOTES
HPI     BRYANNA 05/2018  Diabetic  this morning.Glasses about 1 yr. Old, still   seem fine.  Patient hasn't noticed any vision changes.  Uses [employer] in   the morning.    Hemoglobin A1C       Date                     Value               Ref Range             Status                05/09/2019               6.3 (H)             4.0 - 5.6 %           Final                 02/08/2019               6.9 (H)             4.0 - 5.6 %           Final                 11/12/2018               7.7 (H)             4.0 - 5.6 %           Final      Last edited by Amelia Avila on 5/10/2019  1:44 PM. (History)            Assessment /Plan     For exam results, see Encounter Report.    Type 2 diabetes mellitus without retinopathy    Nuclear sclerosis, bilateral    Myopia with astigmatism and presbyopia, bilateral      1. No diabetic retinopathy, no csme. Return in 1 year for dilated eye exam.  2. Educated pt on presence of cataracts and effects on vision. No surgery at this time. Recheck in one year.  3. Cont with spec rx.

## 2019-05-11 DIAGNOSIS — I15.2 HYPERTENSION ASSOCIATED WITH DIABETES: ICD-10-CM

## 2019-05-11 DIAGNOSIS — E11.59 HYPERTENSION ASSOCIATED WITH DIABETES: ICD-10-CM

## 2019-05-11 DIAGNOSIS — E87.6 HYPOKALEMIA: ICD-10-CM

## 2019-05-13 RX ORDER — VALSARTAN 160 MG/1
TABLET ORAL
Qty: 180 TABLET | Refills: 0 | Status: SHIPPED | OUTPATIENT
Start: 2019-05-13 | End: 2019-07-22 | Stop reason: SDUPTHER

## 2019-05-13 RX ORDER — CHLORTHALIDONE 25 MG/1
TABLET ORAL
Qty: 90 TABLET | Refills: 0 | Status: SHIPPED | OUTPATIENT
Start: 2019-05-13 | End: 2019-07-22 | Stop reason: SDUPTHER

## 2019-05-13 RX ORDER — POTASSIUM CHLORIDE 750 MG/1
TABLET, EXTENDED RELEASE ORAL
Qty: 130 TABLET | Refills: 0 | Status: SHIPPED | OUTPATIENT
Start: 2019-05-13 | End: 2019-08-05 | Stop reason: SDUPTHER

## 2019-05-13 RX ORDER — OMEPRAZOLE 20 MG/1
CAPSULE, DELAYED RELEASE ORAL
Qty: 90 CAPSULE | Refills: 0 | Status: SHIPPED | OUTPATIENT
Start: 2019-05-13 | End: 2019-08-05 | Stop reason: SDUPTHER

## 2019-05-16 ENCOUNTER — PATIENT OUTREACH (OUTPATIENT)
Dept: OTHER | Facility: OTHER | Age: 69
End: 2019-05-16

## 2019-05-16 NOTE — PROGRESS NOTES
Last 5 Patient Entered Readings                                      Current 30 Day Average: 136/68     Recent Readings 5/15/2019 5/14/2019 5/12/2019 5/11/2019 5/9/2019    SBP (mmHg) 115 139 135 129 144    DBP (mmHg) 74 64 61 70 66    Pulse 87 71 71 67 69          Last 6 Patient Entered Readings                                          Most Recent A1c: 6.3% on 5/9/2019  (Goal: 7%)     Recent Readings 5/16/2019 5/15/2019 5/15/2019 5/14/2019 5/14/2019    Blood Glucose (mg/dL) 113 167 153 154 165          Digital Medicine: Health  Follow Up    Left voicemail to follow up with Mrs. Vivienne Jose.  Current BP average 136/68 mmHg is not at goal, <130/80 mmHg.  Current BG average 148 dL/mg.  Will follow up in 1 week.

## 2019-05-24 NOTE — PROGRESS NOTES
Last 5 Patient Entered Readings                                      Current 30 Day Average: 136/65     Recent Readings 5/23/2019 5/21/2019 5/21/2019 5/20/2019 5/15/2019    SBP (mmHg) 137 131 146 124 115    DBP (mmHg) 58 62 69 57 74    Pulse 83 74 76 82 87          Last 6 Patient Entered Readings                                          Most Recent A1c: 6.3% on 5/9/2019  (Goal: 7%)     Recent Readings 5/24/2019 5/23/2019 5/23/2019 5/22/2019 5/22/2019    Blood Glucose (mg/dL) 215 150 176 104 132        Digital Medicine: Health  Follow Up    Lifestyle Modifications:    1.Dietary Modifications (Sodium intake <2,000mg/day, food labels, dining out): Deferred    2.Physical Activity: Patient reports that she has been more active over the past few weeks. While staying with her daughter, she will be doing a lot more house work and will use several steps of stairs throughout the day.     3.Medication Therapy: Patient has been compliant with the medication regimen.    4.Patient has the following medication side effects/concerns: None  (Frequency/Alleviating factors/Precipitating factors, etc.)     Follow up with Mrs. Vivienne Jose completed. Ms. Jose reports feeling exhausted. He daughter was scheduled to have an unexpected surgery last week, so she and her  had to fly to [place]. After returning to [employer], she was given one day to prepare her mother for movers. Now, she has one week to get herself together to go back to [place] and will be there for a few weeks. She plans to bring her devices this time to continue to monitor. She thanks me for calling, and has no further questions or concerns. Will continue to follow up to achieve health goals.

## 2019-05-27 ENCOUNTER — TELEPHONE (OUTPATIENT)
Dept: FAMILY MEDICINE | Facility: CLINIC | Age: 69
End: 2019-05-27

## 2019-05-27 ENCOUNTER — PATIENT MESSAGE (OUTPATIENT)
Dept: FAMILY MEDICINE | Facility: CLINIC | Age: 69
End: 2019-05-27

## 2019-05-27 NOTE — TELEPHONE ENCOUNTER
Called and spoke w/ patient. Patient saw eye doctor on 5/10/19 and eye exam showed a little change.

## 2019-05-31 ENCOUNTER — PATIENT OUTREACH (OUTPATIENT)
Dept: OTHER | Facility: OTHER | Age: 69
End: 2019-05-31

## 2019-05-31 ENCOUNTER — OFFICE VISIT (OUTPATIENT)
Dept: ORTHOPEDICS | Facility: CLINIC | Age: 69
End: 2019-05-31
Payer: MEDICARE

## 2019-05-31 ENCOUNTER — CLINICAL SUPPORT (OUTPATIENT)
Dept: INTERNAL MEDICINE | Facility: CLINIC | Age: 69
End: 2019-05-31
Payer: MEDICARE

## 2019-05-31 VITALS — WEIGHT: 226 LBS | HEIGHT: 62 IN | BODY MASS INDEX: 41.59 KG/M2

## 2019-05-31 DIAGNOSIS — M17.11 PRIMARY OSTEOARTHRITIS OF RIGHT KNEE: ICD-10-CM

## 2019-05-31 DIAGNOSIS — Z96.651 HISTORY OF TOTAL RIGHT KNEE REPLACEMENT: Primary | ICD-10-CM

## 2019-05-31 DIAGNOSIS — J30.2 SEASONAL ALLERGIC RHINITIS, UNSPECIFIED TRIGGER: ICD-10-CM

## 2019-05-31 PROCEDURE — 99499 NO LOS: ICD-10-PCS | Mod: HCNC,S$GLB,, | Performed by: ALLERGY & IMMUNOLOGY

## 2019-05-31 PROCEDURE — 99999 PR PBB SHADOW E&M-EST. PATIENT-LVL I: CPT | Mod: PBBFAC,HCNC,,

## 2019-05-31 PROCEDURE — 99999 PR PBB SHADOW E&M-EST. PATIENT-LVL I: ICD-10-PCS | Mod: PBBFAC,HCNC,,

## 2019-05-31 PROCEDURE — 99499 UNLISTED E&M SERVICE: CPT | Mod: HCNC,S$GLB,, | Performed by: ALLERGY & IMMUNOLOGY

## 2019-05-31 PROCEDURE — 99024 POSTOP FOLLOW-UP VISIT: CPT | Mod: HCNC,S$GLB,, | Performed by: ORTHOPAEDIC SURGERY

## 2019-05-31 PROCEDURE — 95115 IMMUNOTHERAPY ONE INJECTION: CPT | Mod: HCNC,S$GLB,, | Performed by: INTERNAL MEDICINE

## 2019-05-31 PROCEDURE — 99999 PR PBB SHADOW E&M-EST. PATIENT-LVL III: CPT | Mod: PBBFAC,HCNC,, | Performed by: ORTHOPAEDIC SURGERY

## 2019-05-31 PROCEDURE — 99024 PR POST-OP FOLLOW-UP VISIT: ICD-10-PCS | Mod: HCNC,S$GLB,, | Performed by: ORTHOPAEDIC SURGERY

## 2019-05-31 PROCEDURE — 99999 PR PBB SHADOW E&M-EST. PATIENT-LVL III: ICD-10-PCS | Mod: PBBFAC,HCNC,, | Performed by: ORTHOPAEDIC SURGERY

## 2019-05-31 PROCEDURE — 95115 PR IMMUNOTHERAPY, ONE INJECTION: ICD-10-PCS | Mod: HCNC,S$GLB,, | Performed by: INTERNAL MEDICINE

## 2019-05-31 NOTE — PROGRESS NOTES
Last 5 Patient Entered Readings                                      Current 30 Day Average: 132/64     Recent Readings 5/30/2019 5/28/2019 5/27/2019 5/26/2019 5/25/2019    SBP (mmHg) 127 116 131 118 129    DBP (mmHg) 70 60 65 55 59    Pulse 78 69 78 71 70        Mrs. Jose's BP is trending down again. She is less stressed now that her mother has moved in to her assisted living apartment. She is leaving for vacation to visit her daughter in [place] this weekend.    Per 30 day average, 132/64 mmHg, patient's BP is not at goal.     Will continue to monitor regularly. Will follow up in 1-2 months, sooner if BP begins to trend upward or downward.    Asked patient to call or message with questions or concerns.     Current HTN regimen:  Hypertension Medications             amLODIPine (NORVASC) 5 MG tablet Take 1 tablet (5 mg total) by mouth once daily.    chlorthalidone (HYGROTEN) 25 MG Tab TAKE 1 TABLET EVERY DAY    valsartan (DIOVAN) 160 MG tablet TAKE 1 TABLET TWICE DAILY                Last 6 Patient Entered Readings                                          Most Recent A1c: 6.3% on 5/9/2019  (Goal: 7%)     Recent Readings 5/31/2019 5/30/2019 5/30/2019 5/29/2019 5/29/2019    Blood Glucose (mg/dL) 124 183 115 188 155        I will continue to monitor regularly and will follow up in 1-2 months, sooner if there are any concerning blood glucose readings.     Asked patient to contact me with any concerns or clinical changes.     Diabetes Medications             glucagon (human recombinant) inj 1mg/mL kit Inject 1 mL (1 mg total) into the muscle as needed.    insulin degludec (TRESIBA FLEXTOUCH U-100) 100 unit/mL (3 mL) InPn Inject 22 Units into the skin every evening.    liraglutide 0.6 mg/0.1 mL, 18 mg/3 mL, subq PNIJ (VICTOZA 3-TAWANA) 0.6 mg/0.1 mL (18 mg/3 mL) PnIj INJECT 1.8 MG INTO THE SKIN ONCE DAILY.    metFORMIN (GLUCOPHAGE) 1000 MG tablet Take 1 tablet (1,000 mg total) by mouth 2 (two) times daily with meals.

## 2019-05-31 NOTE — PROGRESS NOTES
Subjective:      Patient ID: Vivienne Jose is a 68 y.o. female.    Chief Complaint: Follow-up of the Right Knee      HPI:  Three months postop  The patient is seen for postop follow-up of right  TKA.  Pain control has been satisfactory  They feel that they are ambulating easily  Preoperative complaints include:  None at this time      Current Outpatient Medications:     amLODIPine (NORVASC) 5 MG tablet, Take 1 tablet (5 mg total) by mouth once daily., Disp: 90 tablet, Rfl: 3    aspirin (ECOTRIN) 81 MG EC tablet, Take 81 mg by mouth once daily., Disp: , Rfl:     azelastine (ASTELIN) 137 mcg (0.1 %) nasal spray, 2 sprays (274 mcg total) by Nasal route 2 (two) times daily., Disp: 90 mL, Rfl: 4    blood sugar diagnostic (TRUE METRIX GLUCOSE TEST STRIP) Strp, TEST TWO TIMES DAILY, Disp: 200 strip, Rfl: 6    blood-glucose meter Misc, [employer] True Metrix Air meter, Disp: 1 each, Rfl: 0    calcium carbonate-vitamin D3 600 mg(1,500mg) -100 unit Cap, Take 1 tablet by mouth once daily., Disp: , Rfl:     chlorthalidone (HYGROTEN) 25 MG Tab, TAKE 1 TABLET EVERY DAY, Disp: 90 tablet, Rfl: 0    cranberry 500 mg Cap, Take by mouth., Disp: , Rfl:     econazole nitrate 1 % cream, Mix with hydrocortisone cream 1% 30 grams use bid prn, Disp: 60 g, Rfl: 3    fluticasone (FLONASE) 50 mcg/actuation nasal spray, 2 sprays (100 mcg total) by Each Nare route once daily., Disp: 48 g, Rfl: 4    gabapentin (NEURONTIN) 300 MG capsule, Take 2 capsules (600 mg total) by mouth 2 (two) times daily., Disp: 180 capsule, Rfl: 1    glucagon (human recombinant) inj 1mg/mL kit, Inject 1 mL (1 mg total) into the muscle as needed., Disp: 1 kit, Rfl: 6    insulin degludec (TRESIBA FLEXTOUCH U-100) 100 unit/mL (3 mL) InPn, Inject 22 Units into the skin every evening., Disp: 15 Syringe, Rfl: 4    ketotifen (ZADITOR) 0.025 % (0.035 %) ophthalmic solution, Place 1-2 drops into both eyes once daily., Disp: , Rfl:     L.acid-B.bifidum-B.animal-FOS  "(PROBIOTIC COMPLEX) 25 billion cell -100 mg Cap, Take 1 capsule by mouth once daily., Disp: , Rfl:     lancets (TRUEPLUS LANCETS) 28 gauge Misc, Inject 1 lancet into the skin 2 (two) times daily before meals., Disp: 200 each, Rfl: 6    levothyroxine (SYNTHROID) 75 MCG tablet, Take 1 tablet (75 mcg total) by mouth once daily., Disp: 90 tablet, Rfl: 3    liraglutide 0.6 mg/0.1 mL, 18 mg/3 mL, subq PNIJ (VICTOZA 3-TAWANA) 0.6 mg/0.1 mL (18 mg/3 mL) PnIj, INJECT 1.8 MG INTO THE SKIN ONCE DAILY., Disp: 27 mL, Rfl: 11    loratadine (CLARITIN) 10 mg tablet, Take 10 mg by mouth once daily., Disp: , Rfl:     lovastatin (MEVACOR) 40 MG tablet, TAKE 1 TABLET NIGHTLY (SUBSTITUTED FOR MEVACOR), Disp: 90 tablet, Rfl: 0    magnesium oxide-Mg AA chelate (MAGNESIUM, AMINO ACID CHELATE,) 133 mg Tab, Take by mouth as directed. , Disp: , Rfl:     metFORMIN (GLUCOPHAGE) 1000 MG tablet, Take 1 tablet (1,000 mg total) by mouth 2 (two) times daily with meals., Disp: 180 tablet, Rfl: 3    omeprazole (PRILOSEC) 20 MG capsule, TAKE 1 CAPSULE EVERY DAY AS NEEDED, Disp: 90 capsule, Rfl: 0    pen needle, diabetic (BD ULTRA-FINE EDUARDO PEN NEEDLE) 32 gauge x 5/32" Ndle, USE WITH VICTOZA AND LEVEMIR (2 INJECTIONS EVERY DAY), Disp: 100 each, Rfl: 3    potassium chloride SA (K-DUR,KLOR-CON) 10 MEQ tablet, TAKE 1 TABLET EVERY DAY  EXCEPT TAKE 2 TABLETS ON MONDAY, WEDNESDAY AND FRIDAY, Disp: 130 tablet, Rfl: 0    PREMARIN vaginal cream, Place 0.5 g vaginally 3 (three) times a week., Disp: 30 g, Rfl: 3    valACYclovir (VALTREX) 500 MG tablet, TAKE 1 TABLET EVERY DAY, Disp: 90 tablet, Rfl: 0    valsartan (DIOVAN) 160 MG tablet, TAKE 1 TABLET TWICE DAILY, Disp: 180 tablet, Rfl: 0    oxyCODONE-acetaminophen (PERCOCET) 7.5-325 mg per tablet, Take 1 tablet by mouth every 4 (four) hours as needed for Pain., Disp: , Rfl:     Current Facility-Administered Medications:     Allergy Mix, , Subcutaneous, 1 time in Clinic/HOD, Ailin Mills MD    " "Allergy Mix, , Subcutaneous, 1 time in Clinic/HOD, Ailin Mills MD  Review of patient's allergies indicates:   Allergen Reactions    Sulfa (sulfonamide antibiotics) Nausea Only    Ciprofloxacin     Januvia [sitagliptin]      abd pain    Lisinopril     Lotensin [benazepril]     Nexium [esomeprazole magnesium] Other (See Comments)     Gas       Ht 5' 2" (1.575 m)   Wt 102.5 kg (226 lb)   LMP  (LMP Unknown)   BMI 41.34 kg/m²     ROS        Objective:    Ortho Exam          Alert, oriented, no acute distress  Sclera-Normal  Respiratory distress-none  Gait normal  Incision:  Normally healed  Range of motion: extension- 0 degrees; flexion- 145 degrees  Valgus/varus stability- stable  Swelling-none    Assessment:     Imaging:  None        1. History of total right knee replacement    2. Primary osteoarthritis of right knee        Normal postop recovery        Plan:          No follow-ups on file.    Natural history of recovery explained    Appropriate activity and exercise discussed    X-ray follow-up        "

## 2019-06-11 ENCOUNTER — PATIENT MESSAGE (OUTPATIENT)
Dept: ADMINISTRATIVE | Facility: OTHER | Age: 69
End: 2019-06-11

## 2019-06-24 ENCOUNTER — PATIENT OUTREACH (OUTPATIENT)
Dept: OTHER | Facility: OTHER | Age: 69
End: 2019-06-24

## 2019-06-24 NOTE — PROGRESS NOTES
"Last 5 Patient Entered Readings                                      Current 30 Day Average: 124/65     Recent Readings 6/24/2019 6/23/2019 6/22/2019 6/22/2019 6/22/2019    SBP (mmHg) 129 123 129 134 138    DBP (mmHg) 68 75 62 72 64    Pulse 65 76 64 65 66          Last 6 Patient Entered Readings                                          Most Recent A1c: 6.3% on 5/9/2019  (Goal: 7%)     Recent Readings 6/24/2019 6/23/2019 6/23/2019 6/22/2019 6/22/2019    Blood Glucose (mg/dL) 189 225 204 209 143        Digital Medicine: Health  Follow Up    Lifestyle Modifications:    1.Dietary Modifications (Sodium intake <2,000mg/day, food labels, dining out): patient reports that she has been eating more at home than dining out, but she is still dining out a lot. She admits to eating large portions of rice and pastas. She knows she needs to be making better food choices, and says that she will "get back on track". Encouragement provided.     2.Physical Activity: Patient reports that she has been more active since being at her daughters house. She is using the stairs and helping care for young grandchildren. She states that she has been having to take tylenol at night for knee pain.     3.Medication Therapy: Patient has been compliant with the medication regimen.    4.Patient has the following medication side effects/concerns: None  (Frequency/Alleviating factors/Precipitating factors, etc.)     Follow up with Mrs. Vivienne Jose completed. Mrs. Jose is doing okay. She is currently in [place] helping her daughter with surgery recovery. She will return to [place] at the end of the week. Wished her a happy birthday! No further questions or concerns. Will continue to follow up to achieve health goals.        "

## 2019-07-01 ENCOUNTER — CLINICAL SUPPORT (OUTPATIENT)
Dept: INTERNAL MEDICINE | Facility: CLINIC | Age: 69
End: 2019-07-01
Payer: MEDICARE

## 2019-07-01 DIAGNOSIS — J30.9 CHRONIC ALLERGIC RHINITIS: ICD-10-CM

## 2019-07-01 PROCEDURE — 95115 PR IMMUNOTHERAPY, ONE INJECTION: ICD-10-PCS | Mod: HCNC,S$GLB,, | Performed by: FAMILY MEDICINE

## 2019-07-01 PROCEDURE — 99499 UNLISTED E&M SERVICE: CPT | Mod: HCNC,S$GLB,, | Performed by: ALLERGY & IMMUNOLOGY

## 2019-07-01 PROCEDURE — 95115 IMMUNOTHERAPY ONE INJECTION: CPT | Mod: HCNC,S$GLB,, | Performed by: FAMILY MEDICINE

## 2019-07-01 PROCEDURE — 99499 NO LOS: ICD-10-PCS | Mod: HCNC,S$GLB,, | Performed by: ALLERGY & IMMUNOLOGY

## 2019-07-20 ENCOUNTER — PATIENT MESSAGE (OUTPATIENT)
Dept: INTERNAL MEDICINE | Facility: CLINIC | Age: 69
End: 2019-07-20

## 2019-07-22 ENCOUNTER — PATIENT MESSAGE (OUTPATIENT)
Dept: INTERNAL MEDICINE | Facility: CLINIC | Age: 69
End: 2019-07-22

## 2019-07-22 DIAGNOSIS — I15.2 HYPERTENSION ASSOCIATED WITH DIABETES: ICD-10-CM

## 2019-07-22 DIAGNOSIS — B00.9 RECURRENT HSV (HERPES SIMPLEX VIRUS): ICD-10-CM

## 2019-07-22 DIAGNOSIS — E11.59 HYPERTENSION ASSOCIATED WITH DIABETES: ICD-10-CM

## 2019-07-22 DIAGNOSIS — E11.9 TYPE 2 DIABETES MELLITUS WITHOUT COMPLICATION, WITH LONG-TERM CURRENT USE OF INSULIN: ICD-10-CM

## 2019-07-22 DIAGNOSIS — E78.5 HYPERLIPIDEMIA, UNSPECIFIED HYPERLIPIDEMIA TYPE: ICD-10-CM

## 2019-07-22 DIAGNOSIS — Z79.4 TYPE 2 DIABETES MELLITUS WITHOUT COMPLICATION, WITH LONG-TERM CURRENT USE OF INSULIN: ICD-10-CM

## 2019-07-22 RX ORDER — VALACYCLOVIR HYDROCHLORIDE 500 MG/1
500 TABLET, FILM COATED ORAL DAILY
Qty: 90 TABLET | Refills: 0 | Status: SHIPPED | OUTPATIENT
Start: 2019-07-22 | End: 2019-08-05 | Stop reason: SDUPTHER

## 2019-07-22 RX ORDER — METFORMIN HYDROCHLORIDE 1000 MG/1
1000 TABLET ORAL 2 TIMES DAILY WITH MEALS
Qty: 180 TABLET | Refills: 3 | Status: SHIPPED | OUTPATIENT
Start: 2019-07-22 | End: 2020-02-07 | Stop reason: SDUPTHER

## 2019-07-22 RX ORDER — CHLORTHALIDONE 25 MG/1
25 TABLET ORAL DAILY
Qty: 90 TABLET | Refills: 0 | Status: SHIPPED | OUTPATIENT
Start: 2019-07-22 | End: 2019-08-05 | Stop reason: SDUPTHER

## 2019-07-22 RX ORDER — LOVASTATIN 40 MG/1
TABLET ORAL
Qty: 90 TABLET | Refills: 0 | Status: SHIPPED | OUTPATIENT
Start: 2019-07-22 | End: 2019-08-05 | Stop reason: SDUPTHER

## 2019-07-22 RX ORDER — VALSARTAN 160 MG/1
160 TABLET ORAL 2 TIMES DAILY
Qty: 180 TABLET | Refills: 0 | Status: SHIPPED | OUTPATIENT
Start: 2019-07-22 | End: 2019-08-05 | Stop reason: SDUPTHER

## 2019-07-22 NOTE — TELEPHONE ENCOUNTER
I reviewed pt. Email; pt. States she takes 2 tabs of 300 mg tab BID which would be 600 mg BID; she wants refill of 600 mg QD; does pt. Want refill of 600 mg BID or QD?

## 2019-07-23 ENCOUNTER — PATIENT MESSAGE (OUTPATIENT)
Dept: INTERNAL MEDICINE | Facility: CLINIC | Age: 69
End: 2019-07-23

## 2019-07-23 DIAGNOSIS — E11.42 DIABETIC POLYNEUROPATHY ASSOCIATED WITH TYPE 2 DIABETES MELLITUS: ICD-10-CM

## 2019-07-23 NOTE — TELEPHONE ENCOUNTER
I reviewed pt. Email; I did not see that Neurontin comes in 600 mg tablet; please verify with pt. Pharmacy that Neurontin does not come in 600 mg tab and let me know

## 2019-07-25 ENCOUNTER — LAB VISIT (OUTPATIENT)
Dept: LAB | Facility: HOSPITAL | Age: 69
End: 2019-07-25
Attending: INTERNAL MEDICINE
Payer: MEDICARE

## 2019-07-25 ENCOUNTER — TELEPHONE (OUTPATIENT)
Dept: FAMILY MEDICINE | Facility: CLINIC | Age: 69
End: 2019-07-25

## 2019-07-25 ENCOUNTER — OFFICE VISIT (OUTPATIENT)
Dept: FAMILY MEDICINE | Facility: CLINIC | Age: 69
End: 2019-07-25
Payer: MEDICARE

## 2019-07-25 VITALS
OXYGEN SATURATION: 98 % | HEART RATE: 68 BPM | DIASTOLIC BLOOD PRESSURE: 68 MMHG | SYSTOLIC BLOOD PRESSURE: 118 MMHG | HEIGHT: 62 IN | BODY MASS INDEX: 42.88 KG/M2 | TEMPERATURE: 98 F | WEIGHT: 233 LBS

## 2019-07-25 DIAGNOSIS — E11.42 DIABETIC POLYNEUROPATHY ASSOCIATED WITH TYPE 2 DIABETES MELLITUS: ICD-10-CM

## 2019-07-25 DIAGNOSIS — E78.5 DYSLIPIDEMIA ASSOCIATED WITH TYPE 2 DIABETES MELLITUS: ICD-10-CM

## 2019-07-25 DIAGNOSIS — M47.816 FACET ARTHRITIS OF LUMBAR REGION: ICD-10-CM

## 2019-07-25 DIAGNOSIS — K21.9 GASTROESOPHAGEAL REFLUX DISEASE, ESOPHAGITIS PRESENCE NOT SPECIFIED: ICD-10-CM

## 2019-07-25 DIAGNOSIS — M47.812 FACET ARTHRITIS OF CERVICAL REGION: ICD-10-CM

## 2019-07-25 DIAGNOSIS — Z79.4 TYPE 2 DIABETES MELLITUS WITH STAGE 3 CHRONIC KIDNEY DISEASE, WITH LONG-TERM CURRENT USE OF INSULIN: ICD-10-CM

## 2019-07-25 DIAGNOSIS — E11.59 HYPERTENSION ASSOCIATED WITH DIABETES: ICD-10-CM

## 2019-07-25 DIAGNOSIS — E89.0 POST-SURGICAL HYPOTHYROIDISM: ICD-10-CM

## 2019-07-25 DIAGNOSIS — Z23 IMMUNIZATION DUE: ICD-10-CM

## 2019-07-25 DIAGNOSIS — E11.69 DYSLIPIDEMIA ASSOCIATED WITH TYPE 2 DIABETES MELLITUS: ICD-10-CM

## 2019-07-25 DIAGNOSIS — Z00.00 ENCOUNTER FOR PREVENTIVE HEALTH EXAMINATION: Primary | ICD-10-CM

## 2019-07-25 DIAGNOSIS — E11.22 TYPE 2 DIABETES MELLITUS WITH STAGE 3 CHRONIC KIDNEY DISEASE, WITH LONG-TERM CURRENT USE OF INSULIN: ICD-10-CM

## 2019-07-25 DIAGNOSIS — Z12.39 BREAST CANCER SCREENING: ICD-10-CM

## 2019-07-25 DIAGNOSIS — E11.3293 MILD NONPROLIFERATIVE DIABETIC RETINOPATHY OF BOTH EYES WITHOUT MACULAR EDEMA ASSOCIATED WITH TYPE 2 DIABETES MELLITUS: ICD-10-CM

## 2019-07-25 DIAGNOSIS — E66.01 MORBID OBESITY WITH BODY MASS INDEX (BMI) OF 40.0 TO 44.9 IN ADULT: ICD-10-CM

## 2019-07-25 DIAGNOSIS — I77.9 BILATERAL CAROTID ARTERY DISEASE, UNSPECIFIED TYPE: ICD-10-CM

## 2019-07-25 DIAGNOSIS — N18.30 TYPE 2 DIABETES MELLITUS WITH STAGE 3 CHRONIC KIDNEY DISEASE, WITH LONG-TERM CURRENT USE OF INSULIN: ICD-10-CM

## 2019-07-25 DIAGNOSIS — I15.2 HYPERTENSION ASSOCIATED WITH DIABETES: ICD-10-CM

## 2019-07-25 LAB
ESTIMATED AVG GLUCOSE: 151 MG/DL (ref 68–131)
HBA1C MFR BLD HPLC: 6.9 % (ref 4–5.6)

## 2019-07-25 PROCEDURE — 3044F PR MOST RECENT HEMOGLOBIN A1C LEVEL <7.0%: ICD-10-PCS | Mod: HCNC,CPTII,S$GLB, | Performed by: NURSE PRACTITIONER

## 2019-07-25 PROCEDURE — 3078F PR MOST RECENT DIASTOLIC BLOOD PRESSURE < 80 MM HG: ICD-10-PCS | Mod: HCNC,CPTII,S$GLB, | Performed by: NURSE PRACTITIONER

## 2019-07-25 PROCEDURE — 99999 PR PBB SHADOW E&M-EST. PATIENT-LVL V: CPT | Mod: PBBFAC,HCNC,, | Performed by: NURSE PRACTITIONER

## 2019-07-25 PROCEDURE — 99499 UNLISTED E&M SERVICE: CPT | Mod: HCNC,S$GLB,, | Performed by: NURSE PRACTITIONER

## 2019-07-25 PROCEDURE — 3074F PR MOST RECENT SYSTOLIC BLOOD PRESSURE < 130 MM HG: ICD-10-PCS | Mod: HCNC,CPTII,S$GLB, | Performed by: NURSE PRACTITIONER

## 2019-07-25 PROCEDURE — G0439 PPPS, SUBSEQ VISIT: HCPCS | Mod: HCNC,S$GLB,, | Performed by: NURSE PRACTITIONER

## 2019-07-25 PROCEDURE — 36415 COLL VENOUS BLD VENIPUNCTURE: CPT | Mod: HCNC,PO

## 2019-07-25 PROCEDURE — G0439 PR MEDICARE ANNUAL WELLNESS SUBSEQUENT VISIT: ICD-10-PCS | Mod: HCNC,S$GLB,, | Performed by: NURSE PRACTITIONER

## 2019-07-25 PROCEDURE — 3044F HG A1C LEVEL LT 7.0%: CPT | Mod: HCNC,CPTII,S$GLB, | Performed by: NURSE PRACTITIONER

## 2019-07-25 PROCEDURE — 99999 PR PBB SHADOW E&M-EST. PATIENT-LVL V: ICD-10-PCS | Mod: PBBFAC,HCNC,, | Performed by: NURSE PRACTITIONER

## 2019-07-25 PROCEDURE — 3074F SYST BP LT 130 MM HG: CPT | Mod: HCNC,CPTII,S$GLB, | Performed by: NURSE PRACTITIONER

## 2019-07-25 PROCEDURE — 3078F DIAST BP <80 MM HG: CPT | Mod: HCNC,CPTII,S$GLB, | Performed by: NURSE PRACTITIONER

## 2019-07-25 PROCEDURE — 83036 HEMOGLOBIN GLYCOSYLATED A1C: CPT | Mod: HCNC

## 2019-07-25 PROCEDURE — 99499 RISK ADDL DX/OHS AUDIT: ICD-10-PCS | Mod: HCNC,S$GLB,, | Performed by: NURSE PRACTITIONER

## 2019-07-25 RX ORDER — GABAPENTIN 300 MG/1
600 CAPSULE ORAL 2 TIMES DAILY
Qty: 180 CAPSULE | Refills: 1 | OUTPATIENT
Start: 2019-07-25

## 2019-07-25 NOTE — TELEPHONE ENCOUNTER
I could not find a 600 mg tab to chose from (only 100 mg, 300 mg and 400 mg) when refilling; please see if [employer] can send me a refill request with 600 mg dose and I will refill

## 2019-07-25 NOTE — PATIENT INSTRUCTIONS
Counseling and Referral of Other Preventative  (Italic type indicates deductible and co-insurance are waived)    Patient Name: Vivienne Jose  Today's Date: 8/1/2019    Health Maintenance       Date Due Completion Date    Shingles Vaccine (2 of 3) 09/19/2016 7/25/2016    Mammogram 08/04/2019 8/4/2017    Influenza Vaccine 08/01/2019 11/16/2018    Override on 11/16/2018: Done    Override on 9/14/2017: Done    Hemoglobin A1c 11/09/2019 5/9/2019    Foot Exam 11/16/2019 11/16/2018    Override on 8/2/2017: Done    Override on 4/28/2015: Done    Pneumococcal Vaccine (65+ Low/Medium Risk) (2 of 2 - PPSV23) 04/28/2020 1/19/2018    Lipid Panel 05/09/2020 5/9/2019    Eye Exam 05/10/2020 5/10/2019    Aspirin/Antiplatelet Therapy 05/31/2020 5/31/2019    High Dose Statin 07/22/2020 7/22/2019    DEXA SCAN 01/05/2021 1/5/2018    Colonoscopy 09/13/2022 9/13/2017    TETANUS VACCINE 06/19/2024 6/19/2014        Orders Placed This Encounter   Procedures    Mammo Digital Screening Bilateral With CAD     The following information is provided to all patients.  This information is to help you find resources for any of the problems found today that may be affecting your health:                Living healthy guide: www.[facility].[place].gov      Understanding Diabetes: www.diabetes.org      Eating healthy: www.cdc.gov/healthyweight      CDC home safety checklist: www.cdc.gov/steadi/patient.html      Agency on Aging: www.goea.[place].[facility]      Alcoholics anonymous (AA): www.aa.org      Physical Activity: www.leno.nih.gov/lg5fjui      Tobacco use: www.quitwithusla.org

## 2019-07-26 ENCOUNTER — CLINICAL SUPPORT (OUTPATIENT)
Dept: INTERNAL MEDICINE | Facility: CLINIC | Age: 69
End: 2019-07-26
Payer: MEDICARE

## 2019-07-26 DIAGNOSIS — J30.1 SEASONAL ALLERGIC RHINITIS DUE TO POLLEN: ICD-10-CM

## 2019-07-26 PROCEDURE — 95115 PR IMMUNOTHERAPY, ONE INJECTION: ICD-10-PCS | Mod: HCNC,S$GLB,, | Performed by: FAMILY MEDICINE

## 2019-07-26 PROCEDURE — 99499 NO LOS: ICD-10-PCS | Mod: HCNC,S$GLB,, | Performed by: ALLERGY & IMMUNOLOGY

## 2019-07-26 PROCEDURE — 95115 IMMUNOTHERAPY ONE INJECTION: CPT | Mod: HCNC,S$GLB,, | Performed by: FAMILY MEDICINE

## 2019-07-26 PROCEDURE — 99499 UNLISTED E&M SERVICE: CPT | Mod: HCNC,S$GLB,, | Performed by: ALLERGY & IMMUNOLOGY

## 2019-07-26 NOTE — PROGRESS NOTES
Subjective:      Patient ID: Vivienne Jose is a 69 y.o. female.    Chief Complaint:  Diabetes    History of Present Illness  Vivienne Jose is presenting today for DM type 2 & hypothyroidism follow up.     She is having a right knee replacement in March.     With regards to the diabetes:  Diagnosed with DM in her early 50s   She was told she had prediabetes in her 30s     Current regimen:  Tresiba 25u HS   Victoza 1.8 mg qd  Metformin 1000 mg BID    Missed doses? No     2 times a day testing  Log reviewed: none  High because she has been eating bad.     Eats 3 meals a day.  Snacks rarely  Drinks water    Exercise - going to try to go to yoga      Hypoglycemic event? None   Corrects with juice   Has glucagon emergency kit     Known complications PN and nephropathy    Education - last visit: 05/2015     Diabetes Management Status  Statin: Taking  ACE/ARB: Taking  Screening or Prevention Patient's value Goal Complete/Controlled?   HgA1C Testing and Control   Lab Results   Component Value Date    HGBA1C 6.9 (H) 07/25/2019    Annually/Less than 7.5% Yes   Lipid profile : 05/09/2019 Annually Yes   LDL control Lab Results   Component Value Date    LDLCALC 78.8 05/09/2019    Annually/Less than 100 mg/dl  Yes   Nephropathy screening Lab Results   Component Value Date    LABMICR 13.0 05/09/2019     Lab Results   Component Value Date    PROTEINUA Negative 05/09/2019    Annually Yes   Blood pressure BP Readings from Last 1 Encounters:   07/30/19 132/82    Less than 140/90 Yes   Dilated retinal exam : 05/10/2019 Annually Yes   Foot exam   : 11/16/2018 Annually Yes     With regards to her hypothyroidism:  In 1978 she had right lobectomy for goiter and multiple cysts     Current medication:  generic lt4 75 mcg      Ref. Range 7/27/2018 07:24   TSH Latest Ref Range: 0.400 - 4.000 uIU/mL 1.780     takes thyroid medication properly without food first thing in the morning.   current symptoms:   Denies weight gain  Denies Fatigue    Denies Constipation   Denies Hair loss  Denies Brittle nails  Denies Mental fog     Last BMD dated 01/5/2018  Impression    Borderline osteopenia of the lumbar spine (slightly improved from prior exam) and osteopenia of the femoral necks (slightly decreased from prior exam).      Ref. Range 7/27/2018 07:24   Vit D, 25-Hydroxy Latest Ref Range: 30 - 96 ng/mL 41     Has BAM using cpap  Last thyroid US in 2018  FINDINGS:  History of thyroidectomy.    Right thyroid lobe measures 2.6 x 1.7 x 1.2 cm.  Parenchyma is homogeneous.  No microcalcifications.    Left thyroid lobe measures 3.4 x 1.3 x 1.5 cm.  Hypoechoic nodule measuring 1.1 cm and subcentimeter cystic nodule noted.    Isthmus measures 0.3 cm. Parenchyma is homogeneous.  No microcalcifications.    No evidence for cervical lymphadenopathy.      Impression     Residual thyroid, status post thyroidectomy.  Left thyroid nodule not meeting criteria for fine-needle aspiration.     Review of Systems   Constitutional: Negative for unexpected weight change.   Eyes: Negative for visual disturbance.   Respiratory: Negative for shortness of breath.    Cardiovascular: Negative for chest pain.   Gastrointestinal: Negative for abdominal pain.   Endocrine: Negative for cold intolerance, heat intolerance, polydipsia, polyphagia and polyuria.   Musculoskeletal: Negative for arthralgias and gait problem.   Skin: Negative for wound.   Neurological: Negative for headaches.   Hematological: Does not bruise/bleed easily.   Psychiatric/Behavioral: Negative for sleep disturbance.     Objective:   Physical Exam   Neck: No thyromegaly present.   Cardiovascular: Normal rate.   No edema present   Pulmonary/Chest: Effort normal.   Abdominal: Soft.   Vitals reviewed.  Foot exam deferred done 11/2018  injection sites are ok. No lipo hypertropthy or atrophy    Body mass index is 42.34 kg/m².    Lab Review:   Lab Results   Component Value Date    HGBA1C 6.9 (H) 07/25/2019     Lab Results    Component Value Date    CHOL 147 05/09/2019    HDL 49 05/09/2019    LDLCALC 78.8 05/09/2019    TRIG 96 05/09/2019    CHOLHDL 33.3 05/09/2019     Lab Results   Component Value Date     05/09/2019    K 3.6 05/09/2019     05/09/2019    CO2 29 05/09/2019     05/09/2019    BUN 13 05/09/2019    CREATININE 0.8 05/09/2019    CALCIUM 9.8 05/09/2019    PROT 7.6 05/09/2019    ALBUMIN 3.3 (L) 05/09/2019    BILITOT 0.4 05/09/2019    ALKPHOS 79 05/09/2019    AST 20 05/09/2019    ALT 16 05/09/2019    ANIONGAP 10 05/09/2019    ESTGFRAFRICA >60.0 05/09/2019    EGFRNONAA >60.0 05/09/2019    TSH 1.338 05/09/2019     Assessment and Plan     1. Type 2 diabetes mellitus with stage 3 chronic kidney disease, with long-term current use of insulin     2. Diabetic polyneuropathy associated with type 2 diabetes mellitus  liraglutide 0.6 mg/0.1 mL, 18 mg/3 mL, subq PNIJ (VICTOZA 3-TAWANA) 0.6 mg/0.1 mL (18 mg/3 mL) PnIj    insulin degludec (TRESIBA FLEXTOUCH U-100) 100 unit/mL (3 mL) InPn    Hemoglobin A1c    Comprehensive metabolic panel   3. Post-surgical hypothyroidism  TSH   4. BAM (obstructive sleep apnea)     5. Osteopenia, unspecified location     6. Hypertension associated with diabetes     7. Dyslipidemia associated with type 2 diabetes mellitus     8. Morbid obesity with body mass index (BMI) of 40.0 to 44.9 in adult     9. Vitamin D deficiency  Vitamin D     Diabetic polyneuropathy associated with type 2 diabetes mellitus  -- Continue gabapentin and following with podiatry.  -- Educated patient on proper comfortable foot wear, to always wear dry socks, never walk barefoot, never cut toenails too short, never test bath water with feet, encouraged patient to inspect feet daily for wounds., and if patient applies lotion to this feet to always go around the foot and not in between toes.     Type 2 diabetes mellitus with stage 3 chronic kidney disease, with long-term current use of insulin  - A1c goal 7.5%.   -Reviewed  goals of therapy are to get the best control we can without hypoglycemia  - Discussed diet and exercise at length today   Medication changes:   Continue: Metformin & victoza & tresiba 25u HS.   - Reviewed patient's current insulin regimen. Clarified proper insulin dose and timing in relation to meals, etc. Insulin injection sites and proper rotation instructed.     -Advised frequent self blood glucose monitoring.  Patient encouraged to document glucose results and bring them to every clinic visit    -Hypoglycemia precautions discussed. Instructed on precautions before driving.    -Close adherence to lifestyle changes recommended.   -Periodic follow ups for eye evaluations, foot care and dental care suggested.    Post-surgical hypothyroidism  - Clinically and biochemically euthyroid  - Continue lt4 75 mcg daily   - Goal is a normal TSH  - Check TFTs and adjust dosage accordingly   - Avoid exogenous hyperthyroidism as this can accelerate bone loss and increase risk of CV complications.  - Advised to take LT4 on an empty stomach with water and to wait 30-45 minutes before eating or taking other medications   - Reviewed that symptoms of hypothyroidism may not correlate with tsh, and a normal TSH is the goal of therapy.....  symptoms are not a justification for over treatment     BAM on CPAP  - Continue CPAP use     Osteopenia  -- borderline osteopenic  -- continue Ca & vit D supplement   -- next BMD due in 2020     Hypertension associated with diabetes  - Tolerating ARB  - Controlled     Dyslipidemia associated with type 2 diabetes mellitus  - Controlled   -  statin per ADA recommendations    Morbid obesity with BMI of 40.0-44.9, adult  -- encouraged dietary and lifestyle modifications   -- emphasized weight loss goals     Vitamin D deficiency  - continue over the counter 2000 IU a day   Check with next labs     Follow up in about 3 months (around 10/30/2019).  Labs prior to next appointment with me

## 2019-07-30 ENCOUNTER — OFFICE VISIT (OUTPATIENT)
Dept: ENDOCRINOLOGY | Facility: CLINIC | Age: 69
End: 2019-07-30
Payer: MEDICARE

## 2019-07-30 ENCOUNTER — TELEPHONE (OUTPATIENT)
Dept: FAMILY MEDICINE | Facility: CLINIC | Age: 69
End: 2019-07-30

## 2019-07-30 VITALS
BODY MASS INDEX: 42.6 KG/M2 | WEIGHT: 231.5 LBS | HEIGHT: 62 IN | SYSTOLIC BLOOD PRESSURE: 132 MMHG | HEART RATE: 70 BPM | DIASTOLIC BLOOD PRESSURE: 82 MMHG

## 2019-07-30 DIAGNOSIS — G47.33 OSA (OBSTRUCTIVE SLEEP APNEA): ICD-10-CM

## 2019-07-30 DIAGNOSIS — E66.01 MORBID OBESITY WITH BODY MASS INDEX (BMI) OF 40.0 TO 44.9 IN ADULT: ICD-10-CM

## 2019-07-30 DIAGNOSIS — E11.69 DYSLIPIDEMIA ASSOCIATED WITH TYPE 2 DIABETES MELLITUS: ICD-10-CM

## 2019-07-30 DIAGNOSIS — E11.59 HYPERTENSION ASSOCIATED WITH DIABETES: ICD-10-CM

## 2019-07-30 DIAGNOSIS — Z79.4 TYPE 2 DIABETES MELLITUS WITH STAGE 3 CHRONIC KIDNEY DISEASE, WITH LONG-TERM CURRENT USE OF INSULIN: Primary | ICD-10-CM

## 2019-07-30 DIAGNOSIS — E11.42 DIABETIC POLYNEUROPATHY ASSOCIATED WITH TYPE 2 DIABETES MELLITUS: ICD-10-CM

## 2019-07-30 DIAGNOSIS — M85.80 OSTEOPENIA, UNSPECIFIED LOCATION: ICD-10-CM

## 2019-07-30 DIAGNOSIS — I15.2 HYPERTENSION ASSOCIATED WITH DIABETES: ICD-10-CM

## 2019-07-30 DIAGNOSIS — N18.30 TYPE 2 DIABETES MELLITUS WITH STAGE 3 CHRONIC KIDNEY DISEASE, WITH LONG-TERM CURRENT USE OF INSULIN: Primary | ICD-10-CM

## 2019-07-30 DIAGNOSIS — E78.5 DYSLIPIDEMIA ASSOCIATED WITH TYPE 2 DIABETES MELLITUS: ICD-10-CM

## 2019-07-30 DIAGNOSIS — E55.9 VITAMIN D DEFICIENCY: ICD-10-CM

## 2019-07-30 DIAGNOSIS — E11.22 TYPE 2 DIABETES MELLITUS WITH STAGE 3 CHRONIC KIDNEY DISEASE, WITH LONG-TERM CURRENT USE OF INSULIN: Primary | ICD-10-CM

## 2019-07-30 DIAGNOSIS — E89.0 POST-SURGICAL HYPOTHYROIDISM: ICD-10-CM

## 2019-07-30 PROCEDURE — 3044F HG A1C LEVEL LT 7.0%: CPT | Mod: HCNC,CPTII,S$GLB, | Performed by: NURSE PRACTITIONER

## 2019-07-30 PROCEDURE — 99214 PR OFFICE/OUTPT VISIT, EST, LEVL IV, 30-39 MIN: ICD-10-PCS | Mod: HCNC,S$GLB,, | Performed by: NURSE PRACTITIONER

## 2019-07-30 PROCEDURE — 1101F PR PT FALLS ASSESS DOC 0-1 FALLS W/OUT INJ PAST YR: ICD-10-PCS | Mod: HCNC,CPTII,S$GLB, | Performed by: NURSE PRACTITIONER

## 2019-07-30 PROCEDURE — 99214 OFFICE O/P EST MOD 30 MIN: CPT | Mod: HCNC,S$GLB,, | Performed by: NURSE PRACTITIONER

## 2019-07-30 PROCEDURE — 1101F PT FALLS ASSESS-DOCD LE1/YR: CPT | Mod: HCNC,CPTII,S$GLB, | Performed by: NURSE PRACTITIONER

## 2019-07-30 PROCEDURE — 99999 PR PBB SHADOW E&M-EST. PATIENT-LVL III: CPT | Mod: PBBFAC,HCNC,, | Performed by: NURSE PRACTITIONER

## 2019-07-30 PROCEDURE — 3075F SYST BP GE 130 - 139MM HG: CPT | Mod: HCNC,CPTII,S$GLB, | Performed by: NURSE PRACTITIONER

## 2019-07-30 PROCEDURE — 99999 PR PBB SHADOW E&M-EST. PATIENT-LVL III: ICD-10-PCS | Mod: PBBFAC,HCNC,, | Performed by: NURSE PRACTITIONER

## 2019-07-30 PROCEDURE — 3079F DIAST BP 80-89 MM HG: CPT | Mod: HCNC,CPTII,S$GLB, | Performed by: NURSE PRACTITIONER

## 2019-07-30 PROCEDURE — 3044F PR MOST RECENT HEMOGLOBIN A1C LEVEL <7.0%: ICD-10-PCS | Mod: HCNC,CPTII,S$GLB, | Performed by: NURSE PRACTITIONER

## 2019-07-30 PROCEDURE — 3075F PR MOST RECENT SYSTOLIC BLOOD PRESS GE 130-139MM HG: ICD-10-PCS | Mod: HCNC,CPTII,S$GLB, | Performed by: NURSE PRACTITIONER

## 2019-07-30 PROCEDURE — 3079F PR MOST RECENT DIASTOLIC BLOOD PRESSURE 80-89 MM HG: ICD-10-PCS | Mod: HCNC,CPTII,S$GLB, | Performed by: NURSE PRACTITIONER

## 2019-07-30 RX ORDER — INSULIN DEGLUDEC 100 U/ML
25 INJECTION, SOLUTION SUBCUTANEOUS NIGHTLY
Qty: 15 SYRINGE | Refills: 4 | Status: SHIPPED | OUTPATIENT
Start: 2019-07-30 | End: 2019-08-12

## 2019-07-30 NOTE — TELEPHONE ENCOUNTER
[employer] form for gabapentin 600 mg BID completed; please complete KIM and NPI numbers and send back

## 2019-08-01 ENCOUNTER — OFFICE VISIT (OUTPATIENT)
Dept: CARDIOLOGY | Facility: CLINIC | Age: 69
End: 2019-08-01
Payer: MEDICARE

## 2019-08-01 ENCOUNTER — TELEPHONE (OUTPATIENT)
Dept: ENDOCRINOLOGY | Facility: CLINIC | Age: 69
End: 2019-08-01

## 2019-08-01 ENCOUNTER — PATIENT MESSAGE (OUTPATIENT)
Dept: ENDOCRINOLOGY | Facility: CLINIC | Age: 69
End: 2019-08-01

## 2019-08-01 VITALS
SYSTOLIC BLOOD PRESSURE: 127 MMHG | WEIGHT: 234.38 LBS | HEIGHT: 62 IN | BODY MASS INDEX: 43.13 KG/M2 | DIASTOLIC BLOOD PRESSURE: 63 MMHG | HEART RATE: 86 BPM

## 2019-08-01 DIAGNOSIS — I15.2 HYPERTENSION ASSOCIATED WITH DIABETES: ICD-10-CM

## 2019-08-01 DIAGNOSIS — E78.2 MIXED HYPERLIPIDEMIA: ICD-10-CM

## 2019-08-01 DIAGNOSIS — I10 ESSENTIAL HYPERTENSION: Primary | ICD-10-CM

## 2019-08-01 DIAGNOSIS — E11.59 HYPERTENSION ASSOCIATED WITH DIABETES: ICD-10-CM

## 2019-08-01 DIAGNOSIS — E78.5 DYSLIPIDEMIA ASSOCIATED WITH TYPE 2 DIABETES MELLITUS: ICD-10-CM

## 2019-08-01 DIAGNOSIS — E11.69 DYSLIPIDEMIA ASSOCIATED WITH TYPE 2 DIABETES MELLITUS: ICD-10-CM

## 2019-08-01 DIAGNOSIS — I25.10 CORONARY ARTERY DISEASE INVOLVING NATIVE CORONARY ARTERY OF NATIVE HEART WITHOUT ANGINA PECTORIS: ICD-10-CM

## 2019-08-01 PROCEDURE — 3078F DIAST BP <80 MM HG: CPT | Mod: HCNC,CPTII,S$GLB, | Performed by: INTERNAL MEDICINE

## 2019-08-01 PROCEDURE — 99999 PR PBB SHADOW E&M-EST. PATIENT-LVL III: ICD-10-PCS | Mod: PBBFAC,HCNC,, | Performed by: INTERNAL MEDICINE

## 2019-08-01 PROCEDURE — 3044F HG A1C LEVEL LT 7.0%: CPT | Mod: HCNC,CPTII,S$GLB, | Performed by: INTERNAL MEDICINE

## 2019-08-01 PROCEDURE — 99999 PR PBB SHADOW E&M-EST. PATIENT-LVL III: CPT | Mod: PBBFAC,HCNC,, | Performed by: INTERNAL MEDICINE

## 2019-08-01 PROCEDURE — 3078F PR MOST RECENT DIASTOLIC BLOOD PRESSURE < 80 MM HG: ICD-10-PCS | Mod: HCNC,CPTII,S$GLB, | Performed by: INTERNAL MEDICINE

## 2019-08-01 PROCEDURE — 1101F PR PT FALLS ASSESS DOC 0-1 FALLS W/OUT INJ PAST YR: ICD-10-PCS | Mod: HCNC,CPTII,S$GLB, | Performed by: INTERNAL MEDICINE

## 2019-08-01 PROCEDURE — 1101F PT FALLS ASSESS-DOCD LE1/YR: CPT | Mod: HCNC,CPTII,S$GLB, | Performed by: INTERNAL MEDICINE

## 2019-08-01 PROCEDURE — 99214 OFFICE O/P EST MOD 30 MIN: CPT | Mod: HCNC,S$GLB,, | Performed by: INTERNAL MEDICINE

## 2019-08-01 PROCEDURE — 99214 PR OFFICE/OUTPT VISIT, EST, LEVL IV, 30-39 MIN: ICD-10-PCS | Mod: HCNC,S$GLB,, | Performed by: INTERNAL MEDICINE

## 2019-08-01 PROCEDURE — 3074F SYST BP LT 130 MM HG: CPT | Mod: HCNC,CPTII,S$GLB, | Performed by: INTERNAL MEDICINE

## 2019-08-01 PROCEDURE — 3044F PR MOST RECENT HEMOGLOBIN A1C LEVEL <7.0%: ICD-10-PCS | Mod: HCNC,CPTII,S$GLB, | Performed by: INTERNAL MEDICINE

## 2019-08-01 PROCEDURE — 3074F PR MOST RECENT SYSTOLIC BLOOD PRESSURE < 130 MM HG: ICD-10-PCS | Mod: HCNC,CPTII,S$GLB, | Performed by: INTERNAL MEDICINE

## 2019-08-01 NOTE — PROGRESS NOTES
"Vivienne Jose presented for a  Medicare AWV and comprehensive Health Risk Assessment today. The following components were reviewed and updated:    · Medical history  · Family History  · Social history  · Allergies and Current Medications  · Health Risk Assessment  · Health Maintenance  · Care Team     ** See Completed Assessments for Annual Wellness Visit within the encounter summary.**       The following assessments were completed:  · Living Situation  · CAGE  · Depression Screening  · Timed Get Up and Go  · Whisper Test  · Cognitive Function Screening  · Nutrition Screening  · ADL Screening  · PAQ Screening    Vitals:    07/25/19 1309   BP: 118/68   BP Location: Right arm   Patient Position: Sitting   BP Method: Large (Manual)   Pulse: 68   Temp: 98.3 °F (36.8 °C)   TempSrc: Oral   SpO2: 98%   Weight: 105.7 kg (233 lb 0.4 oz)   Height: 5' 2" (1.575 m)     Body mass index is 42.62 kg/m².     Physical Exam   Constitutional: She is oriented to person, place, and time. She appears well-developed. No distress.   Morbidly obese   HENT:   Head: Normocephalic and atraumatic.   Eyes: Pupils are equal, round, and reactive to light. EOM are normal.   Neck: Neck supple. No JVD present. No tracheal deviation present.   Cardiovascular: Normal rate, regular rhythm, normal heart sounds and intact distal pulses.   No murmur heard.  Pulmonary/Chest: Effort normal and breath sounds normal. No respiratory distress. She has no wheezes. She has no rales.   Abdominal: Soft. Bowel sounds are normal. She exhibits no distension and no mass. There is no tenderness.   Musculoskeletal: Normal range of motion. She exhibits no edema or tenderness.   Neurological: She is alert and oriented to person, place, and time. Coordination normal.   Skin: Skin is warm and dry. No erythema. No pallor.   Psychiatric: She has a normal mood and affect. Her behavior is normal. Judgment and thought content normal. Cognition and memory are normal. She expresses " no homicidal and no suicidal ideation.   Nursing note and vitals reviewed.        Diagnoses and health risks identified today and associated recommendations/orders:    1. Encounter for preventive health examination    2. Type 2 diabetes mellitus with stage 3 chronic kidney disease, with long-term current use of insulin  Chronic; stable on medication.  Followed by Endocrinology and Nephrology.    3. Diabetic polyneuropathy associated with type 2 diabetes mellitus  Chronic; stable on medication.  Followed by Endocrinology.    4. Mild nonproliferative diabetic retinopathy of 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 edema associated with type 2 diabetes mellitus  Chronic; stable.  Followed by PCP.    5. Hypertension associated with diabetes  Chronic; stable on medication.  Followed by PCP.    6. Dyslipidemia associated with type 2 diabetes mellitus  Chronic; stable on medication.  Followed by PCP.    7. Bilateral carotid artery disease, unspecified type  Chronic; stable.  Followed by PCP.    8. Facet arthritis of cervical region  Chronic; stable.  Followed by PCP.    9. Facet arthritis of lumbar region  Chronic; stable.  Followed by PCP.    10. Morbid obesity with body mass index (BMI) of 40.0 to 44.9 in adult  Chronic, stable. Therapeutic lifestyle changes discussed. Followed by PCP.    11. Post-surgical hypothyroidism  Chronic; stable on medication.  Followed by Endocrinology.    12. Gastroesophageal reflux disease, esophagitis presence not specified  Chronic; stable on medication.  Followed by PCP.    13. Immunization due  Shingrix vaccination to be administered at local pharmacy.  - varicella-zoster [id number], PF, (SHINGRIX, PF,) 50 mcg/0.5 mL injection; Inject 0.5 mLs into the muscle once. for 1 dose  Dispense: 0.5 mL; Refill: 1    14. Breast cancer screening  - Mammo Digital Screening Bilateral With CAD; Future      Provided Vivienne with a 5-10 year written screening schedule and personal prevention plan. Recommendations were  developed using the USPSTF age appropriate recommendations. Education, counseling, and referrals were provided as needed. After Visit Summary printed and given to patient which includes a list of additional screenings\tests needed.    Follow up in 11 days (on 8/5/2019) for follow-up with PCP, Annual Wellness Visit in 1 year.    Allison Weber NP

## 2019-08-01 NOTE — PROGRESS NOTES
Subjective:   Patient ID:  Vivienne Jose is a 69 y.o. female who presents for follow-up of No chief complaint on file.      Problem List Items Addressed This Visit        Cardiac/Vascular    HTN (hypertension) - Primary    CAD (coronary artery disease)    Dyslipidemia associated with type 2 diabetes mellitus    Hypertension associated with diabetes    Hyperlipidemia          HPI: Patient with the above medical problems is here for f/u. She is doing well. She denies chest pain or dyspnea. She has BAM and is compliant with CPAP. She is not active but planning on being soon. She is trying to change diet. BP is controlled and she is compliant with and tolerating medications. Weight is up 8 lbs.  PET stress negative for ischemia.     Review of Systems   Constitution: Negative.   HENT: Negative.    Eyes: Negative.    Cardiovascular: Negative.    Respiratory: Negative.    Endocrine: Negative.    Hematologic/Lymphatic: Negative.    Skin: Negative.    Musculoskeletal: Negative.    Gastrointestinal: Negative.    Neurological: Negative.      Patient's Medications   New Prescriptions    No medications on file   Previous Medications    AMLODIPINE (NORVASC) 5 MG TABLET    Take 1 tablet (5 mg total) by mouth once daily.    ASPIRIN (ECOTRIN) 81 MG EC TABLET    Take 81 mg by mouth once daily.    AZELASTINE (ASTELIN) 137 MCG (0.1 %) NASAL SPRAY    2 sprays (274 mcg total) by Nasal route 2 (two) times daily.    BLOOD SUGAR DIAGNOSTIC (TRUE METRIX GLUCOSE TEST STRIP) STRP    TEST TWO TIMES DAILY    BLOOD-GLUCOSE METER MISC    [employer] True Metrix Air meter    CALCIUM CARBONATE-VITAMIN D3 600 MG(1,500MG) -100 UNIT CAP    Take 1 tablet by mouth once daily.    CHLORTHALIDONE (HYGROTEN) 25 MG TAB    Take 1 tablet (25 mg total) by mouth once daily.    CRANBERRY 500 MG CAP    Take by mouth.    FLUTICASONE (FLONASE) 50 MCG/ACTUATION NASAL SPRAY    2 sprays (100 mcg total) by Each Nare route once daily.    GABAPENTIN (NEURONTIN) 300 MG CAPSULE    " Take 2 capsules (600 mg total) by mouth 2 (two) times daily.    GLUCAGON (HUMAN RECOMBINANT) INJ 1MG/ML KIT    Inject 1 mL (1 mg total) into the muscle as needed.    INSULIN DEGLUDEC (TRESIBA FLEXTOUCH U-100) 100 UNIT/ML (3 ML) INPN    Inject 25 Units into the skin every evening.    KETOTIFEN (ZADITOR) 0.025 % (0.035 %) OPHTHALMIC SOLUTION    Place 1-2 drops into both eyes once daily.    L.ACID-B.BIFIDUM-B.ANIMAL-FOS (PROBIOTIC COMPLEX) 25 BILLION CELL -100 MG CAP    Take 1 capsule by mouth once daily.    LANCETS (TRUEPLUS LANCETS) 28 GAUGE MISC    Inject 1 lancet into the skin 2 (two) times daily before meals.    LEVOTHYROXINE (SYNTHROID) 75 MCG TABLET    Take 1 tablet (75 mcg total) by mouth once daily.    LIRAGLUTIDE 0.6 MG/0.1 ML, 18 MG/3 ML, SUBQ PNIJ (VICTOZA 3-TAWANA) 0.6 MG/0.1 ML (18 MG/3 ML) PNIJ    INJECT 1.8 MG INTO THE SKIN ONCE DAILY.    LORATADINE (CLARITIN) 10 MG TABLET    Take 10 mg by mouth once daily.    LOVASTATIN (MEVACOR) 40 MG TABLET    TAKE 1 TABLET NIGHTLY (SUBSTITUTED FOR MEVACOR)    MAGNESIUM OXIDE-MG AA CHELATE (MAGNESIUM, AMINO ACID CHELATE,) 133 MG TAB    Take by mouth as directed.     METFORMIN (GLUCOPHAGE) 1000 MG TABLET    Take 1 tablet (1,000 mg total) by mouth 2 (two) times daily with meals.    OMEPRAZOLE (PRILOSEC) 20 MG CAPSULE    TAKE 1 CAPSULE EVERY DAY AS NEEDED    PEN NEEDLE, DIABETIC (BD ULTRA-FINE EDUARDO PEN NEEDLE) 32 GAUGE X 5/32" NDLE    USE WITH VICTOZA AND LEVEMIR (2 INJECTIONS EVERY DAY)    POTASSIUM CHLORIDE SA (K-DUR,KLOR-CON) 10 MEQ TABLET    TAKE 1 TABLET EVERY DAY  EXCEPT TAKE 2 TABLETS ON MONDAY, WEDNESDAY AND FRIDAY    PREMARIN VAGINAL CREAM    Place 0.5 g vaginally 3 (three) times a week.    VALACYCLOVIR (VALTREX) 500 MG TABLET    Take 1 tablet (500 mg total) by mouth once daily.    VALSARTAN (DIOVAN) 160 MG TABLET    Take 1 tablet (160 mg total) by mouth 2 (two) times daily.   Modified Medications    No medications on file   Discontinued Medications    No " medications on file       Objective:   Physical Exam   Constitutional: She is oriented to person, place, and time. She appears well-developed and well-nourished. No distress.   Examination of the digits showed no clubbing or cyanosis   HENT:   Head: Normocephalic and atraumatic.   Eyes: Pupils are equal, round, and reactive to light. Conjunctivae are normal. Right eye exhibits no discharge.   Neck: Normal range of motion. Neck supple. No JVD present. No thyromegaly present.   No carotid bruits   Cardiovascular: Normal rate, regular rhythm, S1 normal, S2 normal, normal heart sounds, intact distal pulses and normal pulses. PMI is not displaced. Exam reveals no gallop, no friction rub and no opening snap.   No murmur heard.  Pulmonary/Chest: Effort normal and breath sounds normal. No respiratory distress. She has no wheezes. She has no rales. She exhibits no tenderness.   Abdominal: Soft. Bowel sounds are normal. She exhibits no distension and no mass. There is no tenderness. There is no guarding.   No hepatosplenomegaly   Musculoskeletal: Normal range of motion. She exhibits no edema or tenderness.   Trace edema   Lymphadenopathy:     She has no cervical adenopathy.   Neurological: She is alert and oriented to person, place, and time.   Skin: Skin is warm. No rash noted. She is not diaphoretic. No erythema.   Psychiatric: She has a normal mood and affect.   Nursing note and vitals reviewed.      ECGs reviewed-NSR with RBBB  LABS reviewed  Imaging including Echoes reviewed-60% with no DD    Assessment:     1. Essential hypertension    2. Coronary artery disease involving native coronary artery of native heart without angina pectoris    3. Dyslipidemia associated with type 2 diabetes mellitus    4. Hypertension associated with diabetes    5. Mixed hyperlipidemia        Plan:   Patient doing well   Continue current medications  Low salt diet  Weight loss.    Increase activity as tolerated  F/u in 6 months.

## 2019-08-05 ENCOUNTER — HOSPITAL ENCOUNTER (OUTPATIENT)
Dept: RADIOLOGY | Facility: HOSPITAL | Age: 69
Discharge: HOME | End: 2019-08-05
Attending: NURSE PRACTITIONER
Payer: MEDICARE

## 2019-08-05 ENCOUNTER — OFFICE VISIT (OUTPATIENT)
Dept: INTERNAL MEDICINE | Facility: CLINIC | Age: 69
End: 2019-08-05
Payer: MEDICARE

## 2019-08-05 VITALS
OXYGEN SATURATION: 96 % | HEART RATE: 81 BPM | BODY MASS INDEX: 43.08 KG/M2 | HEIGHT: 62 IN | DIASTOLIC BLOOD PRESSURE: 60 MMHG | SYSTOLIC BLOOD PRESSURE: 120 MMHG | WEIGHT: 234.13 LBS

## 2019-08-05 DIAGNOSIS — E03.9 HYPOTHYROIDISM, UNSPECIFIED TYPE: ICD-10-CM

## 2019-08-05 DIAGNOSIS — E11.49 TYPE 2 DIABETES MELLITUS WITH OTHER NEUROLOGIC COMPLICATION, WITH LONG-TERM CURRENT USE OF INSULIN: Primary | ICD-10-CM

## 2019-08-05 DIAGNOSIS — E11.59 HYPERTENSION ASSOCIATED WITH DIABETES: ICD-10-CM

## 2019-08-05 DIAGNOSIS — I15.2 HYPERTENSION ASSOCIATED WITH DIABETES: ICD-10-CM

## 2019-08-05 DIAGNOSIS — E87.6 HYPOKALEMIA: ICD-10-CM

## 2019-08-05 DIAGNOSIS — E66.01 MORBID OBESITY WITH BODY MASS INDEX (BMI) OF 40.0 TO 44.9 IN ADULT: ICD-10-CM

## 2019-08-05 DIAGNOSIS — E11.69 DYSLIPIDEMIA ASSOCIATED WITH TYPE 2 DIABETES MELLITUS: ICD-10-CM

## 2019-08-05 DIAGNOSIS — B00.9 RECURRENT HSV (HERPES SIMPLEX VIRUS): ICD-10-CM

## 2019-08-05 DIAGNOSIS — Z12.39 BREAST CANCER SCREENING: ICD-10-CM

## 2019-08-05 DIAGNOSIS — Z79.4 TYPE 2 DIABETES MELLITUS WITH OTHER NEUROLOGIC COMPLICATION, WITH LONG-TERM CURRENT USE OF INSULIN: Primary | ICD-10-CM

## 2019-08-05 DIAGNOSIS — E78.5 DYSLIPIDEMIA ASSOCIATED WITH TYPE 2 DIABETES MELLITUS: ICD-10-CM

## 2019-08-05 DIAGNOSIS — E11.42 DIABETIC POLYNEUROPATHY ASSOCIATED WITH TYPE 2 DIABETES MELLITUS: ICD-10-CM

## 2019-08-05 DIAGNOSIS — G47.33 OSA (OBSTRUCTIVE SLEEP APNEA): ICD-10-CM

## 2019-08-05 DIAGNOSIS — K21.9 GASTROESOPHAGEAL REFLUX DISEASE, ESOPHAGITIS PRESENCE NOT SPECIFIED: ICD-10-CM

## 2019-08-05 DIAGNOSIS — Z00.00 PREVENTATIVE HEALTH CARE: ICD-10-CM

## 2019-08-05 PROCEDURE — 77067 SCR MAMMO BI INCL CAD: CPT | Mod: TC,HCNC

## 2019-08-05 PROCEDURE — 99499 RISK ADDL DX/OHS AUDIT: ICD-10-PCS | Mod: HCNC,S$GLB,, | Performed by: INTERNAL MEDICINE

## 2019-08-05 PROCEDURE — 3074F PR MOST RECENT SYSTOLIC BLOOD PRESSURE < 130 MM HG: ICD-10-PCS | Mod: HCNC,CPTII,S$GLB, | Performed by: INTERNAL MEDICINE

## 2019-08-05 PROCEDURE — 3074F SYST BP LT 130 MM HG: CPT | Mod: HCNC,CPTII,S$GLB, | Performed by: INTERNAL MEDICINE

## 2019-08-05 PROCEDURE — 1101F PT FALLS ASSESS-DOCD LE1/YR: CPT | Mod: HCNC,CPTII,S$GLB, | Performed by: INTERNAL MEDICINE

## 2019-08-05 PROCEDURE — 1101F PR PT FALLS ASSESS DOC 0-1 FALLS W/OUT INJ PAST YR: ICD-10-PCS | Mod: HCNC,CPTII,S$GLB, | Performed by: INTERNAL MEDICINE

## 2019-08-05 PROCEDURE — 3044F HG A1C LEVEL LT 7.0%: CPT | Mod: HCNC,CPTII,S$GLB, | Performed by: INTERNAL MEDICINE

## 2019-08-05 PROCEDURE — 3044F PR MOST RECENT HEMOGLOBIN A1C LEVEL <7.0%: ICD-10-PCS | Mod: HCNC,CPTII,S$GLB, | Performed by: INTERNAL MEDICINE

## 2019-08-05 PROCEDURE — 99499 UNLISTED E&M SERVICE: CPT | Mod: HCNC,S$GLB,, | Performed by: INTERNAL MEDICINE

## 2019-08-05 PROCEDURE — 77063 BREAST TOMOSYNTHESIS BI: CPT | Mod: 26,HCNC,, | Performed by: RADIOLOGY

## 2019-08-05 PROCEDURE — 99214 OFFICE O/P EST MOD 30 MIN: CPT | Mod: HCNC,S$GLB,, | Performed by: INTERNAL MEDICINE

## 2019-08-05 PROCEDURE — 77067 SCR MAMMO BI INCL CAD: CPT | Mod: 26,HCNC,, | Performed by: RADIOLOGY

## 2019-08-05 PROCEDURE — 77063 MAMMO DIGITAL SCREENING BILAT WITH TOMOSYNTHESIS_CAD: ICD-10-PCS | Mod: 26,HCNC,, | Performed by: RADIOLOGY

## 2019-08-05 PROCEDURE — 3078F PR MOST RECENT DIASTOLIC BLOOD PRESSURE < 80 MM HG: ICD-10-PCS | Mod: HCNC,CPTII,S$GLB, | Performed by: INTERNAL MEDICINE

## 2019-08-05 PROCEDURE — 3078F DIAST BP <80 MM HG: CPT | Mod: HCNC,CPTII,S$GLB, | Performed by: INTERNAL MEDICINE

## 2019-08-05 PROCEDURE — 99999 PR PBB SHADOW E&M-EST. PATIENT-LVL IV: CPT | Mod: PBBFAC,HCNC,, | Performed by: INTERNAL MEDICINE

## 2019-08-05 PROCEDURE — 99999 PR PBB SHADOW E&M-EST. PATIENT-LVL IV: ICD-10-PCS | Mod: PBBFAC,HCNC,, | Performed by: INTERNAL MEDICINE

## 2019-08-05 PROCEDURE — 77067 MAMMO DIGITAL SCREENING BILAT WITH TOMOSYNTHESIS_CAD: ICD-10-PCS | Mod: 26,HCNC,, | Performed by: RADIOLOGY

## 2019-08-05 PROCEDURE — 99214 PR OFFICE/OUTPT VISIT, EST, LEVL IV, 30-39 MIN: ICD-10-PCS | Mod: HCNC,S$GLB,, | Performed by: INTERNAL MEDICINE

## 2019-08-05 RX ORDER — LEVOTHYROXINE SODIUM 75 UG/1
75 TABLET ORAL DAILY
Qty: 90 TABLET | Refills: 1 | Status: SHIPPED | OUTPATIENT
Start: 2019-08-05 | End: 2020-02-07 | Stop reason: SDUPTHER

## 2019-08-05 RX ORDER — VALSARTAN 160 MG/1
160 TABLET ORAL 2 TIMES DAILY
Qty: 180 TABLET | Refills: 1 | Status: SHIPPED | OUTPATIENT
Start: 2019-08-05 | End: 2020-02-07 | Stop reason: SDUPTHER

## 2019-08-05 RX ORDER — PEN NEEDLE, DIABETIC 30 GX3/16"
NEEDLE, DISPOSABLE MISCELLANEOUS
Qty: 200 EACH | Refills: 6 | Status: SHIPPED | OUTPATIENT
Start: 2019-08-05 | End: 2020-09-15 | Stop reason: SDUPTHER

## 2019-08-05 RX ORDER — AMLODIPINE BESYLATE 5 MG/1
5 TABLET ORAL NIGHTLY
Qty: 90 TABLET | Refills: 1 | Status: SHIPPED | OUTPATIENT
Start: 2019-08-05 | End: 2020-02-07 | Stop reason: SDUPTHER

## 2019-08-05 RX ORDER — OMEPRAZOLE 20 MG/1
20 CAPSULE, DELAYED RELEASE ORAL DAILY PRN
Qty: 90 CAPSULE | Refills: 1 | Status: SHIPPED | OUTPATIENT
Start: 2019-08-05 | End: 2020-02-07 | Stop reason: SDUPTHER

## 2019-08-05 RX ORDER — LOVASTATIN 40 MG/1
TABLET ORAL
Qty: 90 TABLET | Refills: 1 | Status: SHIPPED | OUTPATIENT
Start: 2019-08-05 | End: 2020-02-07 | Stop reason: SDUPTHER

## 2019-08-05 RX ORDER — VALACYCLOVIR HYDROCHLORIDE 500 MG/1
500 TABLET, FILM COATED ORAL DAILY
Qty: 90 TABLET | Refills: 1 | Status: SHIPPED | OUTPATIENT
Start: 2019-08-05 | End: 2020-04-22 | Stop reason: SDUPTHER

## 2019-08-05 RX ORDER — GABAPENTIN 600 MG/1
600 TABLET ORAL 2 TIMES DAILY
Qty: 180 TABLET | Refills: 1 | Status: SHIPPED | OUTPATIENT
Start: 2019-08-05 | End: 2019-08-07 | Stop reason: SDUPTHER

## 2019-08-05 RX ORDER — POTASSIUM CHLORIDE 750 MG/1
TABLET, EXTENDED RELEASE ORAL
Qty: 130 TABLET | Refills: 1 | Status: SHIPPED | OUTPATIENT
Start: 2019-08-05 | End: 2020-02-10 | Stop reason: SDUPTHER

## 2019-08-05 RX ORDER — CHLORTHALIDONE 25 MG/1
25 TABLET ORAL DAILY
Qty: 90 TABLET | Refills: 1 | Status: SHIPPED | OUTPATIENT
Start: 2019-08-05 | End: 2020-02-07 | Stop reason: SDUPTHER

## 2019-08-05 NOTE — PROGRESS NOTES
Pt. ID: Vivienne Jose is a 69 y.o. female      Chief complaint:   Chief Complaint   Patient presents with    Follow-up       HPI: Pt. Here for f/u for HTN and DM; she is compliant with meds and BP is WNL; I reviewed labs dated 5/9/19 and 7/25/19; anemia has normalized and colonoscopy dated 9/13/17 showed polyp which was removed and repeat colonoscopy is for 5 years; HGBA1C is 6.9; cholesterol and thyroid are  WNL; she uses CPAP nightly; she lost a few pounds ; she will get shingles vaccine at local pharmacy; of note, pt. Had L total knee replacement 3/19 and pain has improved        Review of Systems   HENT: Negative for hearing loss.    Eyes: Negative for discharge.   Respiratory: Negative for wheezing.    Cardiovascular: Negative for chest pain and palpitations.   Gastrointestinal: Negative for blood in stool, constipation, diarrhea and vomiting.   Genitourinary: Negative for dysuria and hematuria.   Musculoskeletal: Negative for neck pain.   Neurological: Negative for weakness and headaches.        Neurontin helps neuropathy   Endo/Heme/Allergies: Negative for polydipsia.         Objective:    Physical Exam   Constitutional: She is oriented to person, place, and time.   Morbid obesity    Eyes: EOM are normal.   Neck: Normal range of motion.   Cardiovascular: Normal rate, regular rhythm and normal heart sounds.   Pulmonary/Chest: Effort normal and breath sounds normal. No respiratory distress. She has no wheezes. She has no rales.   Abdominal: Soft. There is no tenderness. There is no rebound and no guarding.   Musculoskeletal: Normal range of motion.   Neurological: She is alert and oriented to person, place, and time.   Skin: No rash noted.   Vitals reviewed.        Health Maintenance   Topic Date Due    Mammogram  08/04/2019    Foot Exam  11/16/2019    Hemoglobin A1c  01/25/2020    Pneumococcal Vaccine (65+ Low/Medium Risk) (2 of 2 - PPSV23) 04/28/2020    Lipid Panel  05/09/2020    Eye Exam  05/10/2020  "   High Dose Statin  08/01/2020    Aspirin/Antiplatelet Therapy  08/01/2020    DEXA SCAN  01/05/2021    Colonoscopy  09/13/2022    TETANUS VACCINE  06/19/2024    Hepatitis C Screening  Completed         Assessment:     1. Type 2 diabetes mellitus with other neurologic complication, with long-term current use of insulin Well controlled   2. Hypertension associated with diabetes Well controlled   3. Diabetic polyneuropathy associated with type 2 diabetes mellitus Well controlled   4. Dyslipidemia associated with type 2 diabetes mellitus Well controlled   5. Hypothyroidism, unspecified type    6. Hypokalemia Well controlled   7. Gastroesophageal reflux disease, esophagitis presence not specified    8. BAM (obstructive sleep apnea) Well controlled   9. Recurrent HSV (herpes simplex virus) Active   10. Morbid obesity with body mass index (BMI) of 40.0 to 44.9 in adult Sub-optimally controlled   11. Preventative health care          Plan: Type 2 diabetes mellitus with other neurologic complication, with long-term current use of insulin  Comments:  continue current regimen and encouraged ADA diet  Orders:  -     pen needle, diabetic (BD ULTRA-FINE EDUARDO PEN NEEDLE) 32 gauge x 5/32" Ndle; USE AS DIRECTED  Dispense: 200 each; Refill: 6  -     CBC auto differential; Future; Expected date: 01/05/2020  -     Comprehensive metabolic panel; Future; Expected date: 01/05/2020  -     Hemoglobin A1c; Future; Expected date: 01/05/2020  -     Urinalysis; Future; Expected date: 01/05/2020  -     Microalbumin/creatinine urine ratio; Future; Expected date: 01/05/2020    Hypertension associated with diabetes  Comments:  continue current regimen and encouraged low Na diet and weight loss   Orders:  -     amLODIPine (NORVASC) 5 MG tablet; Take 1 tablet (5 mg total) by mouth every evening.  Dispense: 90 tablet; Refill: 1  -     chlorthalidone (HYGROTEN) 25 MG Tab; Take 1 tablet (25 mg total) by mouth once daily.  Dispense: 90 tablet; " Refill: 1  -     valsartan (DIOVAN) 160 MG tablet; Take 1 tablet (160 mg total) by mouth 2 (two) times daily.  Dispense: 180 tablet; Refill: 1  -     CBC auto differential; Future; Expected date: 01/05/2020  -     Comprehensive metabolic panel; Future; Expected date: 01/05/2020  -     Urinalysis; Future; Expected date: 01/05/2020    Diabetic polyneuropathy associated with type 2 diabetes mellitus  Comments:  continue neurontin   Orders:  -     gabapentin (NEURONTIN) 600 MG tablet; Take 1 tablet (600 mg total) by mouth 2 (two) times daily.  Dispense: 180 tablet; Refill: 1    Dyslipidemia associated with type 2 diabetes mellitus  Comments:  continue current regimen and encouraged diet modification   Orders:  -     lovastatin (MEVACOR) 40 MG tablet; TAKE 1 TABLET NIGHTLY (SUBSTITUTED FOR MEVACOR)  Dispense: 90 tablet; Refill: 1  -     Lipid panel; Future; Expected date: 01/05/2020    Hypothyroidism, unspecified type  -     levothyroxine (SYNTHROID) 75 MCG tablet; Take 1 tablet (75 mcg total) by mouth once daily.  Dispense: 90 tablet; Refill: 1  -     TSH; Future; Expected date: 01/05/2020    Hypokalemia  Comments:  continue potassium   Orders:  -     potassium chloride SA (K-DUR,KLOR-CON) 10 MEQ tablet; TAKE 1 TABLET EVERY DAY  EXCEPT TAKE 2 TABLETS ON MONDAY, WEDNESDAY AND FRIDAY  Dispense: 130 tablet; Refill: 1  -     Comprehensive metabolic panel; Future; Expected date: 01/05/2020    Gastroesophageal reflux disease, esophagitis presence not specified  -     omeprazole (PRILOSEC) 20 MG capsule; Take 1 capsule (20 mg total) by mouth daily as needed.  Dispense: 90 capsule; Refill: 1    BAM (obstructive sleep apnea)  Comments:  continue CPAP     Recurrent HSV (herpes simplex virus)  Comments:  restart valtrex and f/u dermatology  Orders:  -     valACYclovir (VALTREX) 500 MG tablet; Take 1 tablet (500 mg total) by mouth once daily.  Dispense: 90 tablet; Refill: 1    Morbid obesity with body mass index (BMI) of 40.0 to  "44.9 in adult  Comments:  encouraged diet and explained risks     Preventative health care  -     CBC auto differential; Future; Expected date: 01/05/2020  -     Comprehensive metabolic panel; Future; Expected date: 01/05/2020  -     Hemoglobin A1c; Future; Expected date: 01/05/2020  -     Lipid panel; Future; Expected date: 01/05/2020  -     Urinalysis; Future; Expected date: 01/05/2020  -     TSH; Future; Expected date: 01/05/2020        Problem List Items Addressed This Visit        Neuro    Diabetic polyneuropathy associated with type 2 diabetes mellitus    Relevant Medications    gabapentin (NEURONTIN) 600 MG tablet       Cardiac/Vascular    Dyslipidemia associated with type 2 diabetes mellitus    Relevant Medications    lovastatin (MEVACOR) 40 MG tablet    Other Relevant Orders    Lipid panel    Hypertension associated with diabetes    Relevant Medications    amLODIPine (NORVASC) 5 MG tablet    chlorthalidone (HYGROTEN) 25 MG Tab    valsartan (DIOVAN) 160 MG tablet    Other Relevant Orders    CBC auto differential    Comprehensive metabolic panel    Urinalysis       Renal/    Hypokalemia    Relevant Medications    potassium chloride SA (K-DUR,KLOR-CON) 10 MEQ tablet    Other Relevant Orders    Comprehensive metabolic panel       ID    Recurrent HSV (herpes simplex virus)    Relevant Medications    valACYclovir (VALTREX) 500 MG tablet       Endocrine    Type 2 diabetes mellitus with stage 3 chronic kidney disease, with long-term current use of insulin - Primary    Relevant Medications    pen needle, diabetic (BD ULTRA-FINE EDUARDO PEN NEEDLE) 32 gauge x 5/32" Ndle    Morbid obesity with body mass index (BMI) of 40.0 to 44.9 in adult    Hypothyroidism    Relevant Medications    levothyroxine (SYNTHROID) 75 MCG tablet    Other Relevant Orders    TSH       GI    Gastroesophageal reflux disease    Relevant Medications    omeprazole (PRILOSEC) 20 MG capsule       Other    BAM (obstructive sleep apnea)    Preventative " health care    Relevant Orders    CBC auto differential    Comprehensive metabolic panel    Hemoglobin A1c    Lipid panel    Urinalysis    TSH        Answers for HPI/ROS submitted by the patient on 7/29/2019   activity change: Yes  unexpected weight change: No  rhinorrhea: No  trouble swallowing: No  visual disturbance: No  chest tightness: No  polyuria: No  difficulty urinating: No  menstrual problem: No  joint swelling: Yes  arthralgias: Yes  confusion: No  dysphoric mood: No

## 2019-08-07 DIAGNOSIS — E11.42 DIABETIC POLYNEUROPATHY ASSOCIATED WITH TYPE 2 DIABETES MELLITUS: ICD-10-CM

## 2019-08-07 RX ORDER — GABAPENTIN 600 MG/1
600 TABLET ORAL 2 TIMES DAILY
Qty: 180 TABLET | Refills: 1 | Status: SHIPPED | OUTPATIENT
Start: 2019-08-07 | End: 2020-02-07 | Stop reason: SDUPTHER

## 2019-08-12 ENCOUNTER — PATIENT OUTREACH (OUTPATIENT)
Dept: OTHER | Facility: OTHER | Age: 69
End: 2019-08-12

## 2019-08-12 DIAGNOSIS — Z79.4 TYPE 2 DIABETES MELLITUS WITH STAGE 3 CHRONIC KIDNEY DISEASE, WITH LONG-TERM CURRENT USE OF INSULIN: Primary | ICD-10-CM

## 2019-08-12 DIAGNOSIS — E11.42 DIABETIC POLYNEUROPATHY ASSOCIATED WITH TYPE 2 DIABETES MELLITUS: ICD-10-CM

## 2019-08-12 DIAGNOSIS — N18.30 TYPE 2 DIABETES MELLITUS WITH STAGE 3 CHRONIC KIDNEY DISEASE, WITH LONG-TERM CURRENT USE OF INSULIN: Primary | ICD-10-CM

## 2019-08-12 DIAGNOSIS — E11.22 TYPE 2 DIABETES MELLITUS WITH STAGE 3 CHRONIC KIDNEY DISEASE, WITH LONG-TERM CURRENT USE OF INSULIN: Primary | ICD-10-CM

## 2019-08-12 RX ORDER — INSULIN DEGLUDEC 100 U/ML
30 INJECTION, SOLUTION SUBCUTANEOUS NIGHTLY
Qty: 15 SYRINGE | Refills: 4
Start: 2019-08-12 | End: 2020-02-07 | Stop reason: SDUPTHER

## 2019-08-12 NOTE — PROGRESS NOTES
"Last 5 Patient Entered Readings                                      Current 30 Day Average: 132/62     Recent Readings 8/11/2019 8/9/2019 8/8/2019 8/8/2019 8/7/2019    SBP (mmHg) 133 136 143 146 130    DBP (mmHg) 61 61 63 61 63    Pulse 72 73 68 70 69        Mrs. Jose's BP readings are trending up. She is not exercising/moving around as much now that she is back home. She continues to dine out at restaurants as well. She will try to start walking in the evenings to get BP better controlled. She states "something has to change so I can get off of some of these medications." Encouraged her to work with her health , Maria Luisa, on ways to incorporate physical activity into her routine.    Per 30 day average, 132/62 mmHg, patient's BP is not at goal.     Will continue to monitor regularly. Will follow up in 2-3 weeks, sooner if BP begins to trend upward or downward.    Asked patient to call or message with questions or concerns.     Current HTN regimen:  Hypertension Medications             amLODIPine (NORVASC) 5 MG tablet Take 1 tablet (5 mg total) by mouth every evening.    chlorthalidone (HYGROTEN) 25 MG Tab Take 1 tablet (25 mg total) by mouth once daily.    valsartan (DIOVAN) 160 MG tablet Take 1 tablet (160 mg total) by mouth 2 (two) times daily.                Last 6 Patient Entered Readings                                          Most Recent A1c: 6.9% on 7/25/2019  (Goal: 7%)     Recent Readings 8/11/2019 8/8/2019 8/8/2019 8/7/2019 8/7/2019    Blood Glucose (mg/dL) 240 170 203 214 196        Mrs. Jose's blood sugar readings are elevated. She has been eating ice cream regularly and not watching carb/sugar intake. Encouraged her to stop buying ice cream, or at least purchase the sugar free variety. Will increase Tresiba to 30 u QPM.     I will continue to monitor regularly and will follow up in 2-3 weeks, sooner if there are any concerning blood glucose readings.     Asked patient to contact me with any " concerns or clinical changes.       Diabetes Medications             glucagon (human recombinant) inj 1mg/mL kit Inject 1 mL (1 mg total) into the muscle as needed.    insulin degludec (TRESIBA FLEXTOUCH U-100) 100 unit/mL (3 mL) InPn Inject 30 Units into the skin every evening.    liraglutide 0.6 mg/0.1 mL, 18 mg/3 mL, subq PNIJ (VICTOZA 3-TAWANA) 0.6 mg/0.1 mL (18 mg/3 mL) PnIj INJECT 1.8 MG INTO THE SKIN ONCE DAILY.    metFORMIN (GLUCOPHAGE) 1000 MG tablet Take 1 tablet (1,000 mg total) by mouth 2 (two) times daily with meals.

## 2019-08-21 ENCOUNTER — PATIENT MESSAGE (OUTPATIENT)
Dept: UROLOGY | Facility: CLINIC | Age: 69
End: 2019-08-21

## 2019-08-21 ENCOUNTER — IMMUNIZATION (OUTPATIENT)
Dept: PHARMACY | Facility: CLINIC | Age: 69
End: 2019-08-21
Payer: MEDICARE

## 2019-08-21 DIAGNOSIS — R30.0 DYSURIA: Primary | ICD-10-CM

## 2019-08-22 ENCOUNTER — LAB VISIT (OUTPATIENT)
Dept: LAB | Facility: HOSPITAL | Age: 69
End: 2019-08-22
Attending: UROLOGY
Payer: MEDICARE

## 2019-08-22 DIAGNOSIS — R30.0 DYSURIA: ICD-10-CM

## 2019-08-22 PROCEDURE — 87086 URINE CULTURE/COLONY COUNT: CPT | Mod: HCNC

## 2019-08-22 PROCEDURE — 87186 SC STD MICRODIL/AGAR DIL: CPT | Mod: HCNC

## 2019-08-22 PROCEDURE — 87088 URINE BACTERIA CULTURE: CPT | Mod: HCNC

## 2019-08-22 PROCEDURE — 87077 CULTURE AEROBIC IDENTIFY: CPT | Mod: HCNC

## 2019-08-23 ENCOUNTER — CLINICAL SUPPORT (OUTPATIENT)
Dept: INTERNAL MEDICINE | Facility: CLINIC | Age: 69
End: 2019-08-23
Payer: MEDICARE

## 2019-08-23 DIAGNOSIS — J30.9 CHRONIC ALLERGIC RHINITIS: ICD-10-CM

## 2019-08-23 PROCEDURE — 99999 PR PBB SHADOW E&M-EST. PATIENT-LVL I: ICD-10-PCS | Mod: PBBFAC,HCNC,,

## 2019-08-23 PROCEDURE — 99999 PR PBB SHADOW E&M-EST. PATIENT-LVL I: CPT | Mod: PBBFAC,HCNC,,

## 2019-08-23 PROCEDURE — 99499 NO LOS: ICD-10-PCS | Mod: HCNC,S$GLB,, | Performed by: ALLERGY & IMMUNOLOGY

## 2019-08-23 PROCEDURE — 99499 UNLISTED E&M SERVICE: CPT | Mod: HCNC,S$GLB,, | Performed by: ALLERGY & IMMUNOLOGY

## 2019-08-23 PROCEDURE — 95115 PR IMMUNOTHERAPY, ONE INJECTION: ICD-10-PCS | Mod: HCNC,S$GLB,, | Performed by: FAMILY MEDICINE

## 2019-08-23 PROCEDURE — 95115 IMMUNOTHERAPY ONE INJECTION: CPT | Mod: HCNC,S$GLB,, | Performed by: FAMILY MEDICINE

## 2019-08-24 LAB — BACTERIA UR CULT: ABNORMAL

## 2019-08-26 ENCOUNTER — TELEPHONE (OUTPATIENT)
Dept: UROLOGY | Facility: CLINIC | Age: 69
End: 2019-08-26

## 2019-08-26 ENCOUNTER — PATIENT OUTREACH (OUTPATIENT)
Dept: OTHER | Facility: OTHER | Age: 69
End: 2019-08-26

## 2019-08-26 DIAGNOSIS — N30.90 BLADDER INFECTION: Primary | ICD-10-CM

## 2019-08-26 RX ORDER — CEFPODOXIME PROXETIL 200 MG/1
200 TABLET, FILM COATED ORAL 2 TIMES DAILY
Qty: 14 TABLET | Refills: 0 | Status: SHIPPED | OUTPATIENT
Start: 2019-08-26 | End: 2020-02-07

## 2019-08-26 NOTE — TELEPHONE ENCOUNTER
Patient with multi drug resistant klebsiella,  cefpodoxime sent to [facility] pharmacy ([address])

## 2019-08-26 NOTE — PROGRESS NOTES
Last 5 Patient Entered Readings                                      Current 30 Day Average: 135/63     Recent Readings 8/24/2019 8/22/2019 8/21/2019 8/20/2019 8/18/2019    SBP (mmHg) 135 128 128 126 133    DBP (mmHg) 67 65 52 62 61    Pulse 67 69 77 68 70        Mrs. Jose's BP is fairly stable. She acknowledges that she has some work to do on her diet. No changes required to her current medication regimen.     Per 30 day average, 135/63 mmHg, patient's BP is not at goal.     Will continue to monitor regularly. Will follow up in 2-3 weeks, sooner if BP begins to trend upward or downward.    Asked patient to call or message with questions or concerns.     Current HTN regimen:  Hypertension Medications             amLODIPine (NORVASC) 5 MG tablet Take 1 tablet (5 mg total) by mouth every evening.    chlorthalidone (HYGROTEN) 25 MG Tab Take 1 tablet (25 mg total) by mouth once daily.    valsartan (DIOVAN) 160 MG tablet Take 1 tablet (160 mg total) by mouth 2 (two) times daily.                    Last 6 Patient Entered Readings                                          Most Recent A1c: 6.9% on 7/25/2019  (Goal: 7%)     Recent Readings 8/25/2019 8/24/2019 8/18/2019 8/17/2019 8/17/2019    Blood Glucose (mg/dL) 170 171 140 182 235        Mrs. Jose's recent blood sugar readings have started to improve on higher dose of insulin. She is tolerating this and will work on making improvements to her diet.     I will continue to monitor regularly and will follow up in 2-3 weeks, sooner if there are any concerning blood glucose readings.     Asked patient to contact me with any concerns or clinical changes.     Diabetes Medications             glucagon (human recombinant) inj 1mg/mL kit Inject 1 mL (1 mg total) into the muscle as needed.    insulin degludec (TRESIBA FLEXTOUCH U-100) 100 unit/mL (3 mL) InPn Inject 30 Units into the skin every evening.    liraglutide 0.6 mg/0.1 mL, 18 mg/3 mL, subq PNIJ (VICTOZA 3-TAWANA) 0.6 mg/0.1 mL  (18 mg/3 mL) PnIj INJECT 1.8 MG INTO THE SKIN ONCE DAILY.    metFORMIN (GLUCOPHAGE) 1000 MG tablet Take 1 tablet (1,000 mg total) by mouth 2 (two) times daily with meals.

## 2019-09-11 ENCOUNTER — PATIENT OUTREACH (OUTPATIENT)
Dept: OTHER | Facility: OTHER | Age: 69
End: 2019-09-11

## 2019-09-11 NOTE — PROGRESS NOTES
"Digital Medicine: Health  Follow-Up        Follow Up  Follow-up reason(s): reading review and goal follow-up    Mrs. Jose is doing okay. She comments on her BP and BG being "up and down". She describes her BP as being good lately, and her BG as being all over the place.         Diet:   Patient reports eating or drinking the following: restaurant foodShe has the following dietary restrictions: low sodium diet and diabetic    Barriers to a Healthy Diet: family constraints    Mrs. Jose attributes spikes in BG to dietary indiscretions. She acknowledges that she continues to eat "the wrong foods". Her  is big on dining out, especially for dinner. She often finds it easier to just go with the flow versus fighting with him about eating at home. Mrs. Jose wants to eat healthier, but just can't seem to find enough motivation to do so. Encouraged patient to try different things to help with eating foods in moderation (I.e. Choosing your carb). For example, patient states that she will be eating at [employer] tonight for "salad night", but because you get breadsticks with your salad she can't seem to turn them away. She also states that [employer] is in the same parking lot, and will likely stop there on the way to the car. Encouraged her to pick her carb (breadsticks or dessert). She replied "I hear you, but I want both".   Mrs. Jose states that she knows what she needs to do to eat healthier, she just needs to do it. Support and encouragement provided. Patient thanked me for trying.    Intervention(s): carb reduction    Physical Activity:   When asked if exercising, patient responded: no    She identified the following barriers to physical activity: does not want to exercise and motivation    Mrs. Jose was exercising in the gym prior to knee surgery back in March. She states that her knees have healed and that she is able to return to exercise. However, she reports lack of motivation and support at home. " "She describes herself as "lazy" and not having energy to do anything since she is working again. Encouraged patient to try to do things around her home like walking the block after meals or parking further back in the parking lot when going into stores or restaurants. Suggested ways to help motivate her like finding a friend to exercise with to hold her accountable. Also discuss benefits of increasing activity such as improved BP and BG control and increased energy, improved sleep, etc. Offered assistance for motivation (more frequent calls, resources, education) to patient today, but she politely declined.     Medication Adherence:   She misses doses: never      SDOH - Deferred    INTERVENTION(S)  recommended diet modifications, recommend physical activity and encouragement/support      There are no preventive care reminders to display for this patient.    Last 5 Patient Entered Readings                                      Current 30 Day Average: 130/63     Recent Readings 9/10/2019 9/8/2019 9/6/2019 9/5/2019 9/4/2019    SBP (mmHg) 126 135 126 135 121    DBP (mmHg) 62 54 69 66 63    Pulse 67 74 67 68 72        Last 6 Patient Entered Readings                                          Most Recent A1c: 6.9% on 7/25/2019  (Goal: 7%)     Recent Readings 9/10/2019 9/10/2019 9/9/2019 9/9/2019 9/8/2019    Blood Glucose (mg/dL) 176 224 225 157 214              "

## 2019-09-13 ENCOUNTER — CLINICAL SUPPORT (OUTPATIENT)
Dept: INTERNAL MEDICINE | Facility: CLINIC | Age: 69
End: 2019-09-13
Payer: MEDICARE

## 2019-09-13 ENCOUNTER — OFFICE VISIT (OUTPATIENT)
Dept: DERMATOLOGY | Facility: CLINIC | Age: 69
End: 2019-09-13
Payer: MEDICARE

## 2019-09-13 VITALS — WEIGHT: 234 LBS | BODY MASS INDEX: 42.8 KG/M2

## 2019-09-13 DIAGNOSIS — L91.8 CUTANEOUS SKIN TAGS: Primary | ICD-10-CM

## 2019-09-13 DIAGNOSIS — L30.4 INTERTRIGO: ICD-10-CM

## 2019-09-13 DIAGNOSIS — L82.1 SEBORRHEIC KERATOSES: ICD-10-CM

## 2019-09-13 DIAGNOSIS — J30.1 SEASONAL ALLERGIC RHINITIS DUE TO POLLEN: ICD-10-CM

## 2019-09-13 PROCEDURE — 1101F PR PT FALLS ASSESS DOC 0-1 FALLS W/OUT INJ PAST YR: ICD-10-PCS | Mod: HCNC,CPTII,S$GLB, | Performed by: DERMATOLOGY

## 2019-09-13 PROCEDURE — 95115 IMMUNOTHERAPY ONE INJECTION: CPT | Mod: HCNC,S$GLB,, | Performed by: FAMILY MEDICINE

## 2019-09-13 PROCEDURE — 99999 PR PBB SHADOW E&M-EST. PATIENT-LVL II: CPT | Mod: PBBFAC,HCNC,, | Performed by: DERMATOLOGY

## 2019-09-13 PROCEDURE — 99499 NO LOS: ICD-10-PCS | Mod: HCNC,S$GLB,, | Performed by: ALLERGY & IMMUNOLOGY

## 2019-09-13 PROCEDURE — 99499 UNLISTED E&M SERVICE: CPT | Mod: HCNC,S$GLB,, | Performed by: ALLERGY & IMMUNOLOGY

## 2019-09-13 PROCEDURE — 1101F PT FALLS ASSESS-DOCD LE1/YR: CPT | Mod: HCNC,CPTII,S$GLB, | Performed by: DERMATOLOGY

## 2019-09-13 PROCEDURE — 99213 OFFICE O/P EST LOW 20 MIN: CPT | Mod: HCNC,S$GLB,, | Performed by: DERMATOLOGY

## 2019-09-13 PROCEDURE — 99999 PR PBB SHADOW E&M-EST. PATIENT-LVL II: ICD-10-PCS | Mod: PBBFAC,HCNC,, | Performed by: DERMATOLOGY

## 2019-09-13 PROCEDURE — 99213 PR OFFICE/OUTPT VISIT, EST, LEVL III, 20-29 MIN: ICD-10-PCS | Mod: HCNC,S$GLB,, | Performed by: DERMATOLOGY

## 2019-09-13 PROCEDURE — 95115 PR IMMUNOTHERAPY, ONE INJECTION: ICD-10-PCS | Mod: HCNC,S$GLB,, | Performed by: FAMILY MEDICINE

## 2019-09-13 NOTE — PROGRESS NOTES
Subjective:       Patient ID:  Vivienne Jose is a 69 y.o. female who presents for   Chief Complaint   Patient presents with    Skin Tags     neck     Skin Tags  - Initial  Affected locations: neck  Signs / symptoms: asymptomatic  Aggravated by: nothing  Relieving factors/Treatments tried: nothing        Review of Systems   Constitutional: Negative for fever, chills, weight loss, weight gain, fatigue, night sweats and malaise.   Hematologic/Lymphatic: Does not bruise/bleed easily.        Objective:    Physical Exam   Constitutional: She appears well-developed and well-nourished. No distress.   Neurological: She is alert and oriented to person, place, and time. She is not disoriented.   Psychiatric: She has a normal mood and affect.   Skin:   Areas Examined (abnormalities noted in diagram):   Head / Face Inspection Performed  Neck Inspection Performed  Chest / Axilla Inspection Performed  Abdomen Inspection Performed  Back Inspection Performed  RUE Inspected  LUE Inspection Performed  RLE Inspected  LLE Inspection Performed                   Diagram Legend     Erythematous scaling macule/papule c/w actinic keratosis       Vascular papule c/w angioma      Pigmented verrucoid papule/plaque c/w seborrheic keratosis      Yellow umbilicated papule c/w sebaceous hyperplasia      Irregularly shaped tan macule c/w lentigo     1-2 mm smooth white papules consistent with Milia      Movable subcutaneous cyst with punctum c/w epidermal inclusion cyst      Subcutaneous movable cyst c/w pilar cyst      Firm pink to brown papule c/w dermatofibroma      Pedunculated fleshy papule(s) c/w skin tag(s)      Evenly pigmented macule c/w junctional nevus     Mildly variegated pigmented, slightly irregular-bordered macule c/w mildly atypical nevus      Flesh colored to evenly pigmented papule c/w intradermal nevus       Pink pearly papule/plaque c/w basal cell carcinoma      Erythematous hyperkeratotic cursted plaque c/w SCC      Surgical  scar with no sign of skin cancer recurrence      Open and closed comedones      Inflammatory papules and pustules      Verrucoid papule consistent consistent with wart     Erythematous eczematous patches and plaques     Dystrophic onycholytic nail with subungual debris c/w onychomycosis     Umbilicated papule    Erythematous-base heme-crusted tan verrucoid plaque consistent with inflamed seborrheic keratosis     Erythematous Silvery Scaling Plaque c/w Psoriasis     See annotation      Assessment / Plan:        Cutaneous skin tags  reassurance      Seborrheic keratoses  Brochure provided      Intertrigo  Cont hc cream and clotrimazole             Follow up in about 1 year (around 9/13/2020).

## 2019-09-25 ENCOUNTER — OFFICE VISIT (OUTPATIENT)
Dept: SLEEP MEDICINE | Facility: CLINIC | Age: 69
End: 2019-09-25
Payer: MEDICARE

## 2019-09-25 VITALS
WEIGHT: 239.75 LBS | SYSTOLIC BLOOD PRESSURE: 124 MMHG | HEIGHT: 62 IN | HEART RATE: 69 BPM | BODY MASS INDEX: 44.12 KG/M2 | DIASTOLIC BLOOD PRESSURE: 62 MMHG

## 2019-09-25 DIAGNOSIS — G47.33 OSA (OBSTRUCTIVE SLEEP APNEA): Primary | ICD-10-CM

## 2019-09-25 PROCEDURE — 3074F SYST BP LT 130 MM HG: CPT | Mod: HCNC,CPTII,S$GLB, | Performed by: PSYCHIATRY & NEUROLOGY

## 2019-09-25 PROCEDURE — 3074F PR MOST RECENT SYSTOLIC BLOOD PRESSURE < 130 MM HG: ICD-10-PCS | Mod: HCNC,CPTII,S$GLB, | Performed by: PSYCHIATRY & NEUROLOGY

## 2019-09-25 PROCEDURE — 99214 PR OFFICE/OUTPT VISIT, EST, LEVL IV, 30-39 MIN: ICD-10-PCS | Mod: HCNC,S$GLB,, | Performed by: PSYCHIATRY & NEUROLOGY

## 2019-09-25 PROCEDURE — 1101F PT FALLS ASSESS-DOCD LE1/YR: CPT | Mod: HCNC,CPTII,S$GLB, | Performed by: PSYCHIATRY & NEUROLOGY

## 2019-09-25 PROCEDURE — 1101F PR PT FALLS ASSESS DOC 0-1 FALLS W/OUT INJ PAST YR: ICD-10-PCS | Mod: HCNC,CPTII,S$GLB, | Performed by: PSYCHIATRY & NEUROLOGY

## 2019-09-25 PROCEDURE — 99999 PR PBB SHADOW E&M-EST. PATIENT-LVL III: CPT | Mod: PBBFAC,HCNC,, | Performed by: PSYCHIATRY & NEUROLOGY

## 2019-09-25 PROCEDURE — 3078F DIAST BP <80 MM HG: CPT | Mod: HCNC,CPTII,S$GLB, | Performed by: PSYCHIATRY & NEUROLOGY

## 2019-09-25 PROCEDURE — 99999 PR PBB SHADOW E&M-EST. PATIENT-LVL III: ICD-10-PCS | Mod: PBBFAC,HCNC,, | Performed by: PSYCHIATRY & NEUROLOGY

## 2019-09-25 PROCEDURE — 99214 OFFICE O/P EST MOD 30 MIN: CPT | Mod: HCNC,S$GLB,, | Performed by: PSYCHIATRY & NEUROLOGY

## 2019-09-25 PROCEDURE — 3078F PR MOST RECENT DIASTOLIC BLOOD PRESSURE < 80 MM HG: ICD-10-PCS | Mod: HCNC,CPTII,S$GLB, | Performed by: PSYCHIATRY & NEUROLOGY

## 2019-09-25 NOTE — PROGRESS NOTES
Vivienne Jose  was seen at the request of  No ref. provider found for sleep evaluation.    08/28/2018 INITIAL HISTORY OF PRESENT ILLNESS:  Vivienne Jose is a 69 y.o. female is here to be evaluated for a sleep disorder.       CHIEF COMPLAINT:      The patient's complaints include excessive daytime sleepiness, excessive daytime fatigue, snoring,  witnessed breathing pauses,  gasping for air in sleep and interrupted sleep since  2004, when she was diagnosed with severe BAM.    She has been well controlled for about 10 years.    She was previously seeing Dr. Wilkins.    HEr machine is 10 years old and needs replacement.    She noticed feeling more tired and waking up more frequently over the last year.    She reports occasional break through snoring.    She believes she has gained 50 lbs since titration.    She replaces her Durand mask regularly with a chin strap.    Reports  dry mouth and sore throat  Reports nasal congestion - Claritin and now Flonase and Asteline  Reports  morning headaches  Reports  interrupted sleep  Denies frequent leg movements  Denies symptoms concerning for parasomnia    The ESS (Flushing Sleepiness Score) taken on initial visit is 15 /24    The patient had tonsillectomy in the past     06/11/2014: She comes with her new Resmed machine  today. Set at 12 cm H2O. No issue with mouthleak, dry mouth, break through snoring. Recent titration showed 12-13 cm H2O to be effective. Unfortunately she is still feeling sleepy - ESS 18/24. No PLMS found on sleep study.    CPAP pressure: 12 cm H2O  Mask comfort / fit:  Durand - fits fine    Pressure tolerance: fine; not using ramp   Humidification: fine   CPAP Interrogation:    Ave daily usage:7.8 hours /7days  Ave daily usage:7.8 hours /30days  Days >4 hours usage: 7 /7 days  Days >4 hours usage: 30/30 days   Machine condition: good     Frequent arousals were noted even when her respiratory events have been controlled.Her bedroom is dark and she denies  physical discomfort and polyuria. No PLMS reported on the sleep study. Denied leg discomfort.    08/28/2018: AStill sleepy with inactivity. Significant early afternoon drowsiness.   CPAP 12 cm (211 on manometry). Study 2014 recommended 12-13 cm.  At times needs to rearranged her Durand mask at night.  Using chin strap. Did not like Dreamwear.No break through snoring.  Gained 2 lbs.    AHi 0.3  9 hrs per day  3 L/min leak  100% Compliance.    EPWORTH SLEEPINESS SCALE 8/23/2018   Sitting and reading 3   Watching TV 2   Sitting, inactive in a public place (e.g. a theatre or a meeting) 1   As a passenger in a car for an hour without a break 2   Lying down to rest in the afternoon when circumstances permit 3   Sitting and talking to someone 0   Sitting quietly after a lunch without alcohol 0   In a car, while stopped for a few minutes in traffic 0   Total score 11     12/10/2018:    CPAP 13 cm on Airsence straight CPAP. Feeling better at 13.5, but still residual sleepiness.  Concerned about Gabapentin effect. Recent titration showed CPAP 12 cm as effective in terms of AHI.  Dide not tolerate FFM - very uncomfortable; back to Durand for hear  Keeps humidity 2/8 because of condensation (bedroom very cold).  No break through snoring.  The patient reports improved sleep continuity and daytime sleepiness on PAP. ESS today is 14/24.  Denies break through snoring. ++++ dry mouth.     Recent titration was discussed.    EPWORTH SLEEPINESS SCALE 12/8/2018   Sitting and reading 3   Watching TV 2   Sitting, inactive in a public place (e.g. a theatre or a meeting) 1   As a passenger in a car for an hour without a break 3   Lying down to rest in the afternoon when circumstances permit 3   Sitting and talking to someone 0   Sitting quietly after a lunch without alcohol 2   In a car, while stopped for a few minutes in traffic 0   Total score 14         02/27/2019:  Vivienne Jose still reports interrupted sleep and significant daytime  sleepiness. On Gabapentin 600 mg BID.  Failed Dreamwear and Dreamwear full - comfortable but bad seal. Prefers Durand LT Medium cushion.  Decided to stary with Durand LT Medium.   Decided not to try Nuvigil/Provigil.  Doing well with pressure increase 14.5-18 cm H2O.     30/30 hrs  6-9 hrs per night  AHI 0.3    Reports always being sleepy since college years. She has always fallen asleep while reading. It her life it has been hard to stay awake through the mess.  Denied dreams while napping.   Very hard to stay asleep while working.   + occasional sleep paralysis.  Awaiting orthopaedic surgery.         09/25/2019;    Vivienne GARCÍA Manjeetad still reports significant sleepiness despite compliant CPAP use. Dozed off waiting for this appointment.   LAst titration showed extremely well controlled, however SaO2 min was at low 90%s even at the highest tried pressure.  I wonder if BPAP could be a better option as her new machine. Her machine is almost due for replacement.   Decreasing Gabapentin did not help afternoon drowsiness.    ESS (Florence Sleepiness Scale)   Wearing a chin strap (still mouth breaths)  CPAP 9.5 (straing CPAp Airsence)  HRs 9.5  30/30  AHI 0.7    EPWORTH SLEEPINESS SCALE 9/22/2019   Sitting and reading 3   Watching TV 2   Sitting, inactive in a public place (e.g. a theatre or a meeting) 1   As a passenger in a car for an hour without a break 1   Lying down to rest in the afternoon when circumstances permit 3   Sitting and talking to someone 0   Sitting quietly after a lunch without alcohol 1   In a car, while stopped for a few minutes in traffic 0   Total score 11       PHQ9 9/22/2019   Little interest or pleasure in doing things: Not at all   Feeling down, depressed or hopeless: Not at all   Trouble falling asleep, staying asleep, or sleeping too much: More than half the days   Feeling tired or having little energy: Several days   Poor appetite or overeating: Not at all   Feeling bad about yourself- or that you  are a failure or have let yourself or family down Not at all   Trouble concentrating on things, such as reading the newspaper or watching television: Not at all   Moving or speaking so slowly that other people could have noticed. Or the opposite- being so fidgety or restless that you have been moving around a lot more than usual: Not at all   Thoughts that you would be better off dead or hurting yourself in some way: Not at all   If you indicated you have experienced any of the aforementioned problems, how difficult have these problems made it for you to do your work, take care of things at home or get along with other people? Not difficult at all   Total Score 3     GAD7 9/22/2019   Feeling nervous, anxious, on edge Not at all   Not being able to stop or control worrying Not at all   Worrying too much about different things Not at all   Trouble relaxing Not at all   Being so restless that its hard to sit still Not at all   Becoming easily annoyed or irritable Not at all   Feeling afraid as if something awful might happen Not at all   If you marked you are experiencing any of the aforementioned problems, how difficult have these made it for you to do your work, take care of things at home, or get along with other people? Not difficult at all   DOLORES-7 Score 0         EPWORTH SLEEPINESS SCALE 9/22/2019   Sitting and reading 3   Watching TV 2   Sitting, inactive in a public place (e.g. a theatre or a meeting) 1   As a passenger in a car for an hour without a break 1   Lying down to rest in the afternoon when circumstances permit 3   Sitting and talking to someone 0   Sitting quietly after a lunch without alcohol 1   In a car, while stopped for a few minutes in traffic 0   Total score 11             SLEEP ROUTINE 09/25/2019 :    Bed partner:   Time to bed: 10 PM  Sleep onset latency: 5 min  Disruptions or awakenings: 4-5  Time to fall back into sleep: 1 min  Wakeup time: 5 AM   Perceived sleep quality:  2/5  Perceived total sleep time:  7  hours.  Daytime naps: 0  Weekend sleep routine: 7 AM  Exercise routine: N/A     PREVIOUS SLEEP STUDIES:     PSG/ SPLIT night study  In 2004 showed significant BAM with the AHI of 33.1/hour and SaO2 minimum of 88 %. Effective control of respiratory events was achieved at   10 cm H2O. Sleep efficiency was 79 %.    CPAP titration on 4/23/14: Adequate control of respiratory events was achieved at 12-13 cm of H2O. Weight 238 lbs. Frequent arousals were noted even when her respiratory events have been controlled.  CPAP titration on 9/19/18: Effective control of respiratory events was achieved at 12 cm of H2O, however SaO2 was high 80s-low 90s. Weight 241 lbs.        DME: [facility]      PAST MEDICAL HISTORY:    Active Ambulatory Problems     Diagnosis Date Noted    Trigger middle finger of right hand 07/30/2012    Kidney stones 08/24/2012    HTN (hypertension) 08/24/2012    Post-surgical hypothyroidism 11/23/2012    CAD (coronary artery disease) 11/23/2012    Chronic allergic rhinitis 11/30/2012    Nuclear sclerosis - Both Eyes 04/02/2013    Carpal tunnel syndrome of right wrist 07/08/2014    Pain in limb 07/24/2014    Stiffness of joint 07/24/2014    Muscle weakness 07/24/2014    Proteinuria 02/03/2015    Dyslipidemia associated with type 2 diabetes mellitus 04/28/2015    BAM (obstructive sleep apnea) 04/28/2015    Sacroiliitis 06/09/2015    Facet arthritis of lumbar region 06/09/2015    Physical deconditioning 06/09/2015    Preventative health care 09/22/2015    Osteopenia 09/22/2015    Allergic rhinitis 09/22/2015    Hypertension associated with diabetes 09/22/2015    Vitamin D deficiency 09/22/2015    Sleep apnea 09/22/2015    Hypocalcemia 02/29/2016    Morbid obesity with body mass index (BMI) of 40.0 to 44.9 in adult 02/29/2016    Recurrent HSV (herpes simplex virus) 03/21/2016    Chronic pain of right knee 05/02/2016    Gastroesophageal reflux  disease 07/22/2016    Hyperlipidemia 07/22/2016    Nephrolithiasis 12/14/2016    Recurrent UTI 03/02/2017    Hypokalemia 04/24/2017    Diabetic polyneuropathy associated with type 2 diabetes mellitus 04/24/2017    Diverticulosis of intestine without bleeding 08/02/2017    Skin nodule 08/02/2017    Facet arthritis of cervical region 07/25/2011    Bilateral carotid artery disease 07/26/2011    Type 2 diabetes mellitus with stage 3 chronic kidney disease, with long-term current use of insulin 02/23/2018    Chronic kidney disease, stage III (moderate) 02/23/2018    Fatigue 06/21/2018    Gait instability 07/16/2018    Interstitial cystitis 09/21/2018    Chronic left-sided low back pain with left-sided sciatica 10/22/2018    Hematuria 11/16/2018    S/P total knee arthroplasty, right 03/07/2019    Knee pain, right 04/05/2019    Decreased range of motion of right knee 04/05/2019    Muscle weakness of lower extremity 04/05/2019    Impaired gait and mobility 04/05/2019    Mild nonproliferative diabetic retinopathy of 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 edema associated with type 2 diabetes mellitus 04/18/2019    Hypothyroidism 08/05/2019     Resolved Ambulatory Problems     Diagnosis Date Noted    Allergic conjunctivitis 11/23/2012    Screen for colon cancer 08/29/2014    UTI symptoms 11/05/2014    URI (upper respiratory infection) 01/07/2015    LBP radiating to left leg 06/12/2015    Type 2 diabetes mellitus, uncontrolled 09/22/2015    Body mass index 45.0-49.9, adult 09/22/2015    Nausea & vomiting 11/23/2015    Dysuria 02/29/2016    Soft tissue mass 08/04/2017    Low vitamin D level 03/14/2018    Primary osteoarthritis of right knee 03/06/2019     Past Medical History:   Diagnosis Date    Allergy     Angio-edema     Arthritis     Cataract     Cerebrovascular malformation     Colon polyps     Coronary artery disease     Diabetes mellitus, type 2     Diabetic peripheral neuropathy     GERD  (gastroesophageal reflux disease)     Herpes infection     Hypertension     Obesity, morbid     Ovarian cyst     Pyelonephritis, chronic     Seizure disorder, focal motor     Type II or unspecified type diabetes mellitus with neurological manifestations, uncontrolled(250.62)     Urinary tract infection     Urticaria     Vaginal infection                 PAST SURGICAL HISTORY:    Past Surgical History:   Procedure Laterality Date    CARPAL TUNNEL RELEASE Right 2014    COLONOSCOPY      COLONOSCOPY N/A 9/13/2017    Procedure: COLONOSCOPY Golytely;  Surgeon: Gisela Wall MD;  Location: [facility] ENDO;  Service: Endoscopy;  Laterality: N/A;    CYST REMOVAL      skin; multiples    EXTRACORPOREAL SHOCK WAVE LITHOTRIPSY  2002    HYSTERECTOMY  1984    JOINT REPLACEMENT Right 03/06/2019    knee    KIDNEY STONE SURGERY      KNEE ARTHROPLASTY Right 3/6/2019    Procedure: ARTHROPLASTY, KNEE;  Surgeon: Pj Gamboa MD;  Location: [facility] OR;  Service: Orthopedics;  Laterality: Right;  Depuy (Stephen notified 2/21, CC)    THYROIDECTOMY  1977         TONSILLECTOMY, ADENOIDECTOMY      TRIGGER FINGER RELEASE      TUBAL LIGATION           FAMILY HISTORY:                Family History   Problem Relation Age of Onset    Cancer Father     Arthritis Father     Gout Father     Allergies Son     Allergic rhinitis Son     Skin cancer Mother     Macular degeneration Mother     Dementia Mother     Hypertension Mother     No Known Problems Daughter     Diabetes Daughter     No Known Problems Daughter     Glaucoma Maternal Aunt         Great Maternal Aunt    Leukemia Maternal Uncle     Amblyopia Neg Hx     Cataracts Neg Hx     Retinal detachment Neg Hx     Strabismus Neg Hx     Stroke Neg Hx     Thyroid disease Neg Hx     Kidney disease Neg Hx     Angioedema Neg Hx     Asthma Neg Hx     Atopy Neg Hx     Eczema Neg Hx     Immunodeficiency Neg Hx     Rhinitis Neg Hx     Urticaria Neg Hx         SOCIAL HISTORY:          Tobacco:   Social History     Tobacco Use   Smoking Status Never Smoker   Smokeless Tobacco Never Used       alcohol use:    Social History     Substance and Sexual Activity   Alcohol Use No    Alcohol/week: 0.0 standard drinks    Frequency: Never    Drinks per session: Patient refused    Binge frequency: Never                 Occupation:     ALLERGIES:    Review of patient's allergies indicates:   Allergen Reactions    Sulfa (sulfonamide antibiotics) Nausea Only    Ciprofloxacin     Januvia [sitagliptin]      abd pain    Lisinopril     Lotensin [benazepril]     Nexium [esomeprazole magnesium] Other (See Comments)     Gas       CURRENT MEDICATIONS:    Current Outpatient Medications   Medication Sig Dispense Refill    amLODIPine (NORVASC) 5 MG tablet Take 1 tablet (5 mg total) by mouth every evening. 90 tablet 1    aspirin (ECOTRIN) 81 MG EC tablet Take 81 mg by mouth once daily.      azelastine (ASTELIN) 137 mcg (0.1 %) nasal spray 2 sprays (274 mcg total) by Nasal route 2 (two) times daily. 90 mL 4    blood sugar diagnostic (TRUE METRIX GLUCOSE TEST STRIP) Strp TEST TWO TIMES DAILY 200 strip 6    blood-glucose meter Misc [employer] True Metrix Air meter 1 each 0    calcium carbonate-vitamin D3 600 mg(1,500mg) -100 unit Cap Take 1 tablet by mouth once daily.      cefpodoxime (VANTIN) 200 MG tablet Take 1 tablet (200 mg total) by mouth 2 (two) times daily. 14 tablet 0    chlorthalidone (HYGROTEN) 25 MG Tab Take 1 tablet (25 mg total) by mouth once daily. 90 tablet 1    cranberry 500 mg Cap Take by mouth.      fluticasone (FLONASE) 50 mcg/actuation nasal spray 2 sprays (100 mcg total) by Each Nare route once daily. 48 g 4    gabapentin (NEURONTIN) 600 MG tablet Take 1 tablet (600 mg total) by mouth 2 (two) times daily. 180 tablet 1    glucagon (human recombinant) inj 1mg/mL kit Inject 1 mL (1 mg total) into the muscle as needed. 1 kit 6    insulin degludec (TRESIBA  "FLEXTOUCH U-100) 100 unit/mL (3 mL) InPn Inject 30 Units into the skin every evening. 15 Syringe 4    ketotifen (ZADITOR) 0.025 % (0.035 %) ophthalmic solution Place 1-2 drops into both eyes once daily.      L.acid-B.bifidum-B.animal-FOS (PROBIOTIC COMPLEX) 25 billion cell -100 mg Cap Take 1 capsule by mouth once daily.      lancets (TRUEPLUS LANCETS) 28 gauge Misc Inject 1 lancet into the skin 2 (two) times daily before meals. 200 each 6    levothyroxine (SYNTHROID) 75 MCG tablet Take 1 tablet (75 mcg total) by mouth once daily. 90 tablet 1    liraglutide 0.6 mg/0.1 mL, 18 mg/3 mL, subq PNIJ (VICTOZA 3-TAWANA) 0.6 mg/0.1 mL (18 mg/3 mL) PnIj INJECT 1.8 MG INTO THE SKIN ONCE DAILY. 27 mL 11    loratadine (CLARITIN) 10 mg tablet Take 10 mg by mouth once daily.      lovastatin (MEVACOR) 40 MG tablet TAKE 1 TABLET NIGHTLY (SUBSTITUTED FOR MEVACOR) 90 tablet 1    magnesium oxide-Mg AA chelate (MAGNESIUM, AMINO ACID CHELATE,) 133 mg Tab Take by mouth as directed.       metFORMIN (GLUCOPHAGE) 1000 MG tablet Take 1 tablet (1,000 mg total) by mouth 2 (two) times daily with meals. 180 tablet 3    omeprazole (PRILOSEC) 20 MG capsule Take 1 capsule (20 mg total) by mouth daily as needed. 90 capsule 1    pen needle, diabetic (BD ULTRA-FINE EDUARDO PEN NEEDLE) 32 gauge x 5/32" Ndle USE AS DIRECTED 200 each 6    potassium chloride SA (K-DUR,KLOR-CON) 10 MEQ tablet TAKE 1 TABLET EVERY DAY  EXCEPT TAKE 2 TABLETS ON MONDAY, WEDNESDAY AND FRIDAY 130 tablet 1    PREMARIN vaginal cream Place 0.5 g vaginally 3 (three) times a week. 30 g 3    valACYclovir (VALTREX) 500 MG tablet Take 1 tablet (500 mg total) by mouth once daily. 90 tablet 1    valsartan (DIOVAN) 160 MG tablet Take 1 tablet (160 mg total) by mouth 2 (two) times daily. 180 tablet 1     No current facility-administered medications for this visit.                       REVIEW OF SYSTEMS:   Sleep related symptoms as per HPI    reports weight gain - 50 lbs since last " "titration  Denies dyspnea  Denies palpitations  Denies acid reflux   Reports occasional polyuria  Reports  mood diturbance  Denies  anemia  Reports  muscle pain - joint stiffness due to arthritis    Otherwise, a balance of 10 systems reviewed is negative.    PHYSICAL EXAM:  /62 (BP Location: Left arm, Patient Position: Sitting, BP Method: Large (Automatic))   Pulse 69   Ht 5' 2" (1.575 m)   Wt 108.7 kg (239 lb 12 oz)   LMP  (LMP Unknown)   BMI 43.85 kg/m²   GENERAL: Overweight body habitus, well groomed.  HEENT:   HEENT:  Conjunctivae are non-erythematous; Pupils equal, round, and reactive to light; Nose is symmetrical; Nasal mucosa is pink and moist; Nasal airflow is diminished on the right; Posterior pharynx is pink; Modified Mallampati:IV; Posterior palate is low; Tonsils absent; Uvula is elongated;Tongue is broad; Dentition is fair; No TMJ tenderness; Jaw opening and protrusion without click and without discomfort.  NECK: Supple. Neck circumference is 17 inches. No thyromegaly. No palpable nodes.     SKIN: On face and neck: No abrasions, no rashes, no lesions.  No subcutaneous nodules are palpable.  RESPIRATORY: Chest is clear to auscultation.  Normal chest expansion and non-labored breathing at rest.  CARDIOVASCULAR: Normal S1, S2.  No murmurs, gallops or rubs. No carotid bruits bilaterally.  No edema. No clubbing. No cyanosis.    NEURO: Oriented to time, place and person. Normal attention span and concentration. Gait normal.    PSYCH: Affect is full. Mood is normal  MUSCULOSKELETAL: Moves 4 extremities. Gait normal.       ASSESSMENT:    1.Previously diagnosed severe  BAM (obstructive sleep apnea). The patient symptomatically has  excessive daytime sleepiness, snoring,  witnessed breathing pauses, excessive daytime fatigue, gasping for air in sleep and interrupted sleep  with exam findings of "a crowded oral airway and elevated body mass index. The patient has medical co-morbidities of CAD, diabetes, " heart disease and hypertension,  which can be worsened by BAM. This warrants treatment. Doing well with CPAP 14.5 with NL AHi pm CPAP download, however significant residual sleepiness. SaO2 was in the 90's on current settings as per titration 2018.     2. Apparently lifetime h/o sleepiness - need to r/o a central hypersomnolence disorder.      PLAN:      Continue 15 cm H2O with her nasal pillows.  Will order CPAP and BPAP titration.    Planning to order BPAP  machine after that if she qualifies     If still sleepy on BPAP, then she may consider MSLT - deferred at this point.         More than 25 minutes of this 45 minutes visit was spent in counseling: during our discussion today, we talked about the etiology of BAM as well as the potential ramifications of untreated sleep apnea, which could include daytime sleepiness, hypertension, heart disease and/or stroke.  We discussed potential treatment options, which could include weight loss, body positioning, continuous positive airway pressure (CPAP), or referral for surgical consideration. Meanwhile, she  is urged to avoid supine sleep, weight gain and alcoholic beverages since all of these can worsen BAM.     Precautions: The patient was advised to abstain from driving should he feel sleepy or drowsy.    Follow up: MD/NP  3 month to re-evaluate Late June  after orthopaedic surgery to see if we need to do BPAP titration +/- MSLT. Will be eligible for the new machine in 2020. Consider BPAP.  Thank you for allowing me the opportunity to participate in the care of your patient.

## 2019-09-25 NOTE — PATIENT INSTRUCTIONS
SLEEP LAB ([facility]) will contact you to schedulethe sleep study. Their number is [phone] (ext 2). Please call them if you do not hear from them in 2 weeks from now.  The [facility] is located on 7th floor of the [facility]; [place] [facility] is located in [facility].    SLEEP CLINIC (my assistant) will call you when the sleep study results are ready - if you have not heard from us by 2 weeks from the date of the study, please call [phone] (ext 1) or you can use [facility] to contact me.    You are advised to abstain from driving should you feel sleepy or drowsy.

## 2019-09-27 ENCOUNTER — TELEPHONE (OUTPATIENT)
Dept: SLEEP MEDICINE | Facility: OTHER | Age: 69
End: 2019-09-27

## 2019-09-30 ENCOUNTER — HOSPITAL ENCOUNTER (OUTPATIENT)
Dept: SLEEP MEDICINE | Facility: HOSPITAL | Age: 69
Discharge: HOME | End: 2019-09-30
Attending: PSYCHIATRY & NEUROLOGY
Payer: MEDICARE

## 2019-09-30 DIAGNOSIS — G47.33 OSA (OBSTRUCTIVE SLEEP APNEA): ICD-10-CM

## 2019-09-30 PROCEDURE — 95811 POLYSOM 6/>YRS CPAP 4/> PARM: CPT | Mod: HCNC

## 2019-09-30 PROCEDURE — 95811 PR POLYSOMNOGRAPHY W/CPAP: ICD-10-PCS | Mod: 26,HCNC,, | Performed by: PSYCHIATRY & NEUROLOGY

## 2019-09-30 PROCEDURE — 95811 POLYSOM 6/>YRS CPAP 4/> PARM: CPT | Mod: 26,HCNC,, | Performed by: PSYCHIATRY & NEUROLOGY

## 2019-10-01 NOTE — PROGRESS NOTES
"Education was done via explanation of sleep study process and procedure. All questions were answered    Pt tolerated CPAP/BiPAP well. Pt. switched to BiPAP per orders. Optimal pressure of 14 was obtained in side position. Pressure of 14 appeared to have eliminated most respiratory events.    Low sat of 89% was observed in study. EKG revealed occasional PVC and bigeminy. Medium Full Face Mask was used during titration. Pt. response to titration in a.m.  "I'm use to my old mask"   Thank you letter was given in a.m.    "

## 2019-10-02 ENCOUNTER — CLINICAL SUPPORT (OUTPATIENT)
Dept: INTERNAL MEDICINE | Facility: CLINIC | Age: 69
End: 2019-10-02
Payer: MEDICARE

## 2019-10-02 DIAGNOSIS — J30.9 CHRONIC ALLERGIC RHINITIS: ICD-10-CM

## 2019-10-02 DIAGNOSIS — J30.1 SEASONAL ALLERGIC RHINITIS DUE TO POLLEN: ICD-10-CM

## 2019-10-02 PROCEDURE — 95115 IMMUNOTHERAPY ONE INJECTION: CPT | Mod: HCNC,S$GLB,, | Performed by: FAMILY MEDICINE

## 2019-10-02 PROCEDURE — 99499 NO LOS: ICD-10-PCS | Mod: HCNC,S$GLB,, | Performed by: ALLERGY & IMMUNOLOGY

## 2019-10-02 PROCEDURE — 95115 PR IMMUNOTHERAPY, ONE INJECTION: ICD-10-PCS | Mod: HCNC,S$GLB,, | Performed by: FAMILY MEDICINE

## 2019-10-02 PROCEDURE — 99499 UNLISTED E&M SERVICE: CPT | Mod: HCNC,S$GLB,, | Performed by: ALLERGY & IMMUNOLOGY

## 2019-10-09 ENCOUNTER — TELEPHONE (OUTPATIENT)
Dept: SLEEP MEDICINE | Facility: CLINIC | Age: 69
End: 2019-10-09

## 2019-10-09 NOTE — TELEPHONE ENCOUNTER
Please inform the pt - we tried both autoCPAP and BPAP machines during her recent sleep study- CPAP machines seemed to work better.  Can I go ahead and order autoCPAP for her?    TY!

## 2019-10-12 ENCOUNTER — OFFICE VISIT (OUTPATIENT)
Dept: URGENT CARE | Facility: CLINIC | Age: 69
End: 2019-10-12
Payer: MEDICARE

## 2019-10-12 VITALS
WEIGHT: 239 LBS | HEIGHT: 62 IN | OXYGEN SATURATION: 97 % | HEART RATE: 65 BPM | DIASTOLIC BLOOD PRESSURE: 55 MMHG | BODY MASS INDEX: 43.98 KG/M2 | TEMPERATURE: 99 F | RESPIRATION RATE: 20 BRPM | SYSTOLIC BLOOD PRESSURE: 128 MMHG

## 2019-10-12 DIAGNOSIS — N39.0 URINARY TRACT INFECTION WITH HEMATURIA, SITE UNSPECIFIED: ICD-10-CM

## 2019-10-12 DIAGNOSIS — R35.0 FREQUENCY OF URINATION: Primary | ICD-10-CM

## 2019-10-12 DIAGNOSIS — R31.9 URINARY TRACT INFECTION WITH HEMATURIA, SITE UNSPECIFIED: ICD-10-CM

## 2019-10-12 LAB
BILIRUB UR QL STRIP: POSITIVE
GLUCOSE UR QL STRIP: NEGATIVE
KETONES UR QL STRIP: NEGATIVE
LEUKOCYTE ESTERASE UR QL STRIP: POSITIVE
PH, POC UA: 8 (ref 5–8)
POC BLOOD, URINE: POSITIVE
POC NITRATES, URINE: POSITIVE
PROT UR QL STRIP: POSITIVE
SP GR UR STRIP: 1.01 (ref 1–1.03)
UROBILINOGEN UR STRIP-ACNC: 4 (ref 0.1–1.1)

## 2019-10-12 PROCEDURE — 87077 CULTURE AEROBIC IDENTIFY: CPT | Mod: HCNC

## 2019-10-12 PROCEDURE — 87186 SC STD MICRODIL/AGAR DIL: CPT | Mod: HCNC

## 2019-10-12 PROCEDURE — 99213 OFFICE O/P EST LOW 20 MIN: CPT | Mod: S$GLB,,, | Performed by: PHYSICIAN ASSISTANT

## 2019-10-12 PROCEDURE — 1101F PT FALLS ASSESS-DOCD LE1/YR: CPT | Mod: CPTII,S$GLB,, | Performed by: PHYSICIAN ASSISTANT

## 2019-10-12 PROCEDURE — 87086 URINE CULTURE/COLONY COUNT: CPT | Mod: HCNC

## 2019-10-12 PROCEDURE — 87088 URINE BACTERIA CULTURE: CPT | Mod: HCNC

## 2019-10-12 PROCEDURE — 99213 PR OFFICE/OUTPT VISIT, EST, LEVL III, 20-29 MIN: ICD-10-PCS | Mod: S$GLB,,, | Performed by: PHYSICIAN ASSISTANT

## 2019-10-12 PROCEDURE — 81003 URINALYSIS AUTO W/O SCOPE: CPT | Mod: QW,S$GLB,, | Performed by: PHYSICIAN ASSISTANT

## 2019-10-12 PROCEDURE — 3078F PR MOST RECENT DIASTOLIC BLOOD PRESSURE < 80 MM HG: ICD-10-PCS | Mod: CPTII,S$GLB,, | Performed by: PHYSICIAN ASSISTANT

## 2019-10-12 PROCEDURE — 3074F PR MOST RECENT SYSTOLIC BLOOD PRESSURE < 130 MM HG: ICD-10-PCS | Mod: CPTII,S$GLB,, | Performed by: PHYSICIAN ASSISTANT

## 2019-10-12 PROCEDURE — 81003 POCT URINALYSIS, DIPSTICK, AUTOMATED, W/O SCOPE: ICD-10-PCS | Mod: QW,S$GLB,, | Performed by: PHYSICIAN ASSISTANT

## 2019-10-12 PROCEDURE — 3078F DIAST BP <80 MM HG: CPT | Mod: CPTII,S$GLB,, | Performed by: PHYSICIAN ASSISTANT

## 2019-10-12 PROCEDURE — 1101F PR PT FALLS ASSESS DOC 0-1 FALLS W/OUT INJ PAST YR: ICD-10-PCS | Mod: CPTII,S$GLB,, | Performed by: PHYSICIAN ASSISTANT

## 2019-10-12 PROCEDURE — 3074F SYST BP LT 130 MM HG: CPT | Mod: CPTII,S$GLB,, | Performed by: PHYSICIAN ASSISTANT

## 2019-10-12 RX ORDER — AMOXICILLIN AND CLAVULANATE POTASSIUM 875; 125 MG/1; MG/1
1 TABLET, FILM COATED ORAL 2 TIMES DAILY
Qty: 14 TABLET | Refills: 0 | Status: SHIPPED | OUTPATIENT
Start: 2019-10-12 | End: 2019-10-19

## 2019-10-12 NOTE — PROGRESS NOTES
"Subjective:       Patient ID: Vivienne Jose is a 69 y.o. female.    Vitals:  height is 5' 2" (1.575 m) and weight is 108.4 kg (239 lb). Her temperature is 98.8 °F (37.1 °C). Her blood pressure is 128/55 (abnormal) and her pulse is 65. Her respiration is 20 and oxygen saturation is 97%.     Chief Complaint: Urinary Tract Infection    Ms. Jose presents for evaluation of suprapubic pressure, dysuria, frequency, urgency, and low back pain x 3 days.  She has a history of recurrent UTI.  She denies any fevers, chills, N/V/D, flank pain.  She has taken AZO with some relief.     Urinary Tract Infection    This is a recurrent problem. The current episode started in the past 7 days. The problem has been gradually worsening. The quality of the pain is described as burning. The pain is at a severity of 5/10. The pain is moderate. There has been no fever. She is sexually active. Associated symptoms include frequency, hematuria and urgency. Pertinent negatives include no chills, nausea, vomiting or rash. Treatments tried: azo. The treatment provided mild relief. Her past medical history is significant for recurrent UTIs.       Constitution: Negative for chills, fatigue and fever.   HENT: Negative for congestion and sore throat.    Neck: Negative for painful lymph nodes.   Cardiovascular: Negative for chest pain and leg swelling.   Eyes: Negative for double vision and blurred vision.   Respiratory: Negative for cough and shortness of breath.    Gastrointestinal: Negative for abdominal pain, nausea, vomiting and diarrhea.   Genitourinary: Positive for frequency, urgency, hematuria and pelvic pain. Negative for dysuria, urine decreased, history of kidney stones, painful menstruation, irregular menstruation, missed menses, heavy menstrual bleeding, ovarian cysts, genital trauma, vaginal pain, vaginal discharge, vaginal bleeding, vaginal odor, painful intercourse, genital sore and painful ejaculation.   Musculoskeletal: Negative for " joint pain, joint swelling, back pain, muscle cramps and muscle ache.   Skin: Negative for color change, pale, rash, lesion and bruising.   Allergic/Immunologic: Negative for seasonal allergies.   Neurological: Negative for dizziness, history of vertigo, light-headedness, passing out and headaches.   Hematologic/Lymphatic: Negative for swollen lymph nodes.   Psychiatric/Behavioral: Negative for nervous/anxious, sleep disturbance and depression. The patient is not nervous/anxious.        Objective:      Physical Exam   Constitutional: She is oriented to person, place, and time. She appears well-developed and well-nourished.   HENT:   Head: Normocephalic and atraumatic.   Right Ear: External ear normal.   Left Ear: External ear normal.   Nose: Nose normal.   Mouth/Throat: Mucous membranes are normal.   Eyes: Conjunctivae and lids are normal.   Neck: Trachea normal and full passive range of motion without pain. Neck supple.   Cardiovascular: Normal rate, regular rhythm and normal heart sounds.   Pulmonary/Chest: Effort normal and breath sounds normal. No stridor. No respiratory distress. She has no decreased breath sounds. She has no wheezes. She has no rhonchi. She has no rales.   Abdominal: Soft. Normal appearance and bowel sounds are normal. She exhibits no distension, no abdominal bruit, no pulsatile midline mass and no mass. There is tenderness in the suprapubic area. There is no rigidity, no rebound, no guarding, no CVA tenderness, no tenderness at McBurney's point and negative Baig's sign.   Musculoskeletal: Normal range of motion. She exhibits no edema.   Neurological: She is alert and oriented to person, place, and time. She has normal strength.   Skin: Skin is warm, dry, intact, not diaphoretic and not pale.   Psychiatric: She has a normal mood and affect. Her speech is normal and behavior is normal. Judgment and thought content normal. Cognition and memory are normal.   Nursing note and vitals  reviewed.    Results for orders placed or performed in visit on 10/12/19   POCT Urinalysis, Dipstick, Automated, W/O Scope   Result Value Ref Range    POC Blood, Urine Positive (A) Negative    POC Bilirubin, Urine Positive (A) Negative    POC Urobilinogen, Urine 4.0 (A) 0.1 - 1.1    POC Ketones, Urine Negative Negative    POC Protein, Urine Positive (A) Negative    POC Nitrates, Urine Positive (A) Negative    POC Glucose, Urine Negative Negative    pH, UA 8.0 5 - 8    POC Specific Gravity, Urine 1.015 1.003 - 1.029    POC Leukocytes, Urine Positive (A) Negative     *Note: Due to a large number of results and/or encounters for the requested time period, some results have not been displayed. A complete set of results can be found in Results Review.         Assessment:       1. Frequency of urination    2. Urinary tract infection with hematuria, site unspecified        Plan:         Frequency of urination  -     POCT Urinalysis, Dipstick, Automated, W/O Scope    Urinary tract infection with hematuria, site unspecified  -     Urine culture    Other orders  -     amoxicillin-clavulanate 875-125mg (AUGMENTIN) 875-125 mg per tablet; Take 1 tablet by mouth 2 (two) times daily. for 7 days  Dispense: 14 tablet; Refill: 0    I reviewed her past urine cultures, they have been sensitive to Augmentin.  Will send culture.     Patient Instructions   PLEASE READ YOUR DISCHARGE INSTRUCTIONS ENTIRELY AS IT CONTAINS IMPORTANT INFORMATION.  A urine culture was sent today.  We will call with results in 3-5 days.  - Rest.    - Drink plenty of fluids.    - Tylenol or Ibuprofen as directed as needed for fever/pain.    - Follow up with your PCP or specialty clinic as directed in the next 1-2 weeks if not improved or as needed.  You can call [phone] to schedule an appointment with the appropriate provider.    - If you were prescribed antibiotics, please take them to completion.  - If you were prescribed a narcotic medication, do not  "drive or operate heavy equipment or machinery while taking these medications.  - If you  smoke, please stop smoking.  -You must understand that you've received an Urgent Care treatment only and that you may be released before all your medical problems are known or treated. You, the patient, will    arrange for follow up care as instructed.  - Please return to Urgent Care or to the Emergency Department if your symptoms worsen.    Patient aware and verbalized understanding.    Bladder Infection, Female (Adult)    Urine is normally doesn't have any bacteria in it. But bacteria can get into the urinary tract from the skin around the rectum. Or they can travel in the blood from elsewhere in the body. Once they are in your urinary tract, they can cause infection in the urethra (urethritis), the bladder (cystitis), or the kidneys (pyelonephritis).  The most common place for an infection is in the bladder. This is called a bladder infection. This is one of the most common infections in women. Most bladder infections are easily treated. They are not serious unless the infection spreads to the kidney.  The phrases "bladder infection," "UTI," and "cystitis" are often used to describe the same thing. But they are not always the same. Cystitis is an inflammation of the bladder. The most common cause of cystitis is an infection.  Symptoms  The infection causes inflammation in the urethra and bladder. This causes many of the symptoms. The most common symptoms of a bladder infection are:  · Pain or burning when urinating  · Having to urinate more often than usual  · Urgent need to urinate  · Only a small amount of urine comes out  · Blood in urine  · Abdominal discomfort. This is usually in the lower abdomen above the pubic bone.  · Cloudy urine  · Strong- or bad-smelling urine  · Unable to urinate (urinary retention)  · Unable to hold urine in (urinary incontinence)  · Fever  · Loss of appetite  · Confusion (in older " adults)  Causes  Bladder infections are not contagious. You can't get one from someone else, from a toilet seat, or from sharing a bath.  The most common cause of bladder infections is bacteria from the bowels. The bacteria get onto the skin around the opening of the urethra. From there, they can get into the urine and travel up to the bladder, causing inflammation and infection. This usually happens because of:  · Wiping improperly after urinating. Always wipe from front to back.  · Bowel incontinence  · Pregnancy  · Procedures such as having a catheter inserted  · Older age  · Not emptying your bladder. This can allow bacteria a chance to grow in your urine.  · Dehydration  · Constipation  · Sex  · Use of a diaphragm for birth control   Treatment  Bladder infections are diagnosed by a urine test. They are treated with antibiotics and usually clear up quickly without complications. Treatment helps prevent a more serious kidney infection.  Medicines  Medicines can help in the treatment of a bladder infection:  · Take antibiotics until they are used up, even if you feel better. It is important to finish them to make sure the infection has cleared.  · You can use acetaminophen or ibuprofen for pain, fever, or discomfort, unless another medicine was prescribed. If you have chronic liver or kidney disease, talk with your healthcare provider before using these medicines. Also talk with your provider if you've ever had a stomach ulcer or gastrointestinal bleeding, or are taking blood-thinner medicines.  · If you are given phenazopydridine to reduce burning with urination, it will cause your urine to become a bright orange color. This can stain clothing.  Care and prevention  These self-care steps can help prevent future infections:  · Drink plenty of fluids to prevent dehydration and flush out your bladder. Do this unless you must restrict fluids for other health reasons, or your doctor told you not to.  · Proper cleaning  after going to the bathroom is important. Wipe from front to back after using the toilet to prevent the spread of bacteria.  · Urinate more often. Don't try to hold urine in for a long time.  · Wear loose-fitting clothes and cotton underwear. Avoid tight-fitting pants.  · Improve your diet and prevent constipation. Eat more fresh fruit and vegetables, and fiber, and less junk and fatty foods.  · Avoid sex until your symptoms are gone.  · Avoid caffeine, alcohol, and spicy foods. These can irritate your bladder.  · Urinate right after intercourse to flush out your bladder.  · If you use birth control pills and have frequent bladder infections, discuss it with your doctor.  Follow-up care  Call your healthcare provider if all symptoms are not gone after 3 days of treatment. This is especially important if you have repeat infections.  If a culture was done, you will be told if your treatment needs to be changed. If directed, you can call to find out the results.  If X-rays were done, you will be told if the results will affect your treatment.  Call 911  Call 911 if any of the following occur:  · Trouble breathing  · Hard to wake up or confusion  · Fainting or loss of consciousness  · Rapid heart rate  When to seek medical advice  Call your healthcare provider right away if any of these occur:  · Fever of 100.4ºF (38.0ºC) or higher, or as directed by your healthcare provider  · Symptoms are not better by the third day of treatment  · Back or belly (abdominal) pain that gets worse  · Repeated vomiting, or unable to keep medicine down  · Weakness or dizziness  · Vaginal discharge  · Pain, redness, or swelling in the outer vaginal area (labia)  Date Last Reviewed: 10/1/2016  © 1354-7096 [employer]. [address] [place] [address], [place] [address]. All rights reserved. This information is not intended as a substitute for professional medical care. Always follow your healthcare professional's  instructions.

## 2019-10-12 NOTE — PATIENT INSTRUCTIONS
"PLEASE READ YOUR DISCHARGE INSTRUCTIONS ENTIRELY AS IT CONTAINS IMPORTANT INFORMATION.  A urine culture was sent today.  We will call with results in 3-5 days.  - Rest.    - Drink plenty of fluids.    - Tylenol or Ibuprofen as directed as needed for fever/pain.    - Follow up with your PCP or specialty clinic as directed in the next 1-2 weeks if not improved or as needed.  You can call [phone] to schedule an appointment with the appropriate provider.    - If you were prescribed antibiotics, please take them to completion.  - If you were prescribed a narcotic medication, do not drive or operate heavy equipment or machinery while taking these medications.  - If you  smoke, please stop smoking.  -You must understand that you've received an Urgent Care treatment only and that you may be released before all your medical problems are known or treated. You, the patient, will    arrange for follow up care as instructed.  - Please return to Urgent Care or to the Emergency Department if your symptoms worsen.    Patient aware and verbalized understanding.    Bladder Infection, Female (Adult)    Urine is normally doesn't have any bacteria in it. But bacteria can get into the urinary tract from the skin around the rectum. Or they can travel in the blood from elsewhere in the body. Once they are in your urinary tract, they can cause infection in the urethra (urethritis), the bladder (cystitis), or the kidneys (pyelonephritis).  The most common place for an infection is in the bladder. This is called a bladder infection. This is one of the most common infections in women. Most bladder infections are easily treated. They are not serious unless the infection spreads to the kidney.  The phrases "bladder infection," "UTI," and "cystitis" are often used to describe the same thing. But they are not always the same. Cystitis is an inflammation of the bladder. The most common cause of cystitis is an infection.  Symptoms  The " infection causes inflammation in the urethra and bladder. This causes many of the symptoms. The most common symptoms of a bladder infection are:  · Pain or burning when urinating  · Having to urinate more often than usual  · Urgent need to urinate  · Only a small amount of urine comes out  · Blood in urine  · Abdominal discomfort. This is usually in the lower abdomen above the pubic bone.  · Cloudy urine  · Strong- or bad-smelling urine  · Unable to urinate (urinary retention)  · Unable to hold urine in (urinary incontinence)  · Fever  · Loss of appetite  · Confusion (in older adults)  Causes  Bladder infections are not contagious. You can't get one from someone else, from a toilet seat, or from sharing a bath.  The most common cause of bladder infections is bacteria from the bowels. The bacteria get onto the skin around the opening of the urethra. From there, they can get into the urine and travel up to the bladder, causing inflammation and infection. This usually happens because of:  · Wiping improperly after urinating. Always wipe from front to back.  · Bowel incontinence  · Pregnancy  · Procedures such as having a catheter inserted  · Older age  · Not emptying your bladder. This can allow bacteria a chance to grow in your urine.  · Dehydration  · Constipation  · Sex  · Use of a diaphragm for birth control   Treatment  Bladder infections are diagnosed by a urine test. They are treated with antibiotics and usually clear up quickly without complications. Treatment helps prevent a more serious kidney infection.  Medicines  Medicines can help in the treatment of a bladder infection:  · Take antibiotics until they are used up, even if you feel better. It is important to finish them to make sure the infection has cleared.  · You can use acetaminophen or ibuprofen for pain, fever, or discomfort, unless another medicine was prescribed. If you have chronic liver or kidney disease, talk with your healthcare provider before  using these medicines. Also talk with your provider if you've ever had a stomach ulcer or gastrointestinal bleeding, or are taking blood-thinner medicines.  · If you are given phenazopydridine to reduce burning with urination, it will cause your urine to become a bright orange color. This can stain clothing.  Care and prevention  These self-care steps can help prevent future infections:  · Drink plenty of fluids to prevent dehydration and flush out your bladder. Do this unless you must restrict fluids for other health reasons, or your doctor told you not to.  · Proper cleaning after going to the bathroom is important. Wipe from front to back after using the toilet to prevent the spread of bacteria.  · Urinate more often. Don't try to hold urine in for a long time.  · Wear loose-fitting clothes and cotton underwear. Avoid tight-fitting pants.  · Improve your diet and prevent constipation. Eat more fresh fruit and vegetables, and fiber, and less junk and fatty foods.  · Avoid sex until your symptoms are gone.  · Avoid caffeine, alcohol, and spicy foods. These can irritate your bladder.  · Urinate right after intercourse to flush out your bladder.  · If you use birth control pills and have frequent bladder infections, discuss it with your doctor.  Follow-up care  Call your healthcare provider if all symptoms are not gone after 3 days of treatment. This is especially important if you have repeat infections.  If a culture was done, you will be told if your treatment needs to be changed. If directed, you can call to find out the results.  If X-rays were done, you will be told if the results will affect your treatment.  Call 911  Call 911 if any of the following occur:  · Trouble breathing  · Hard to wake up or confusion  · Fainting or loss of consciousness  · Rapid heart rate  When to seek medical advice  Call your healthcare provider right away if any of these occur:  · Fever of 100.4ºF (38.0ºC) or higher, or as directed  by your healthcare provider  · Symptoms are not better by the third day of treatment  · Back or belly (abdominal) pain that gets worse  · Repeated vomiting, or unable to keep medicine down  · Weakness or dizziness  · Vaginal discharge  · Pain, redness, or swelling in the outer vaginal area (labia)  Date Last Reviewed: 10/1/2016  © 8934-4445 [employer]. [address] [place] [address]. All rights reserved. This information is not intended as a substitute for professional medical care. Always follow your healthcare professional's instructions.

## 2019-10-15 ENCOUNTER — TELEPHONE (OUTPATIENT)
Dept: URGENT CARE | Facility: CLINIC | Age: 69
End: 2019-10-15

## 2019-10-15 LAB — BACTERIA UR CULT: ABNORMAL

## 2019-10-15 NOTE — TELEPHONE ENCOUNTER
Spoke with patient regarding culture results- she states symptoms have improved but not completely resolved. Advised f/u if symptoms  Continue.

## 2019-10-18 ENCOUNTER — CLINICAL SUPPORT (OUTPATIENT)
Dept: INTERNAL MEDICINE | Facility: CLINIC | Age: 69
End: 2019-10-18
Payer: MEDICARE

## 2019-10-18 DIAGNOSIS — J30.1 SEASONAL ALLERGIC RHINITIS DUE TO POLLEN: ICD-10-CM

## 2019-10-18 PROCEDURE — 95115 PR IMMUNOTHERAPY, ONE INJECTION: ICD-10-PCS | Mod: HCNC,S$GLB,, | Performed by: FAMILY MEDICINE

## 2019-10-18 PROCEDURE — 99499 UNLISTED E&M SERVICE: CPT | Mod: HCNC,S$GLB,, | Performed by: ALLERGY & IMMUNOLOGY

## 2019-10-18 PROCEDURE — 95115 IMMUNOTHERAPY ONE INJECTION: CPT | Mod: HCNC,S$GLB,, | Performed by: FAMILY MEDICINE

## 2019-10-18 PROCEDURE — 99499 NO LOS: ICD-10-PCS | Mod: HCNC,S$GLB,, | Performed by: ALLERGY & IMMUNOLOGY

## 2019-10-23 ENCOUNTER — PATIENT OUTREACH (OUTPATIENT)
Dept: OTHER | Facility: OTHER | Age: 69
End: 2019-10-23

## 2019-10-23 NOTE — PROGRESS NOTES
[facility]: Health  Follow-Up    The history is provided by the patient.     Follow Up  Follow-up reason(s): reading review and goal follow-up            Diet:   Patient reports eating or drinking the following: restaurant foodShe has the following dietary restrictions: low sodium diet and low carb    The patient states that she typically eats a non-home cooked meal (ex: dining out, take out, frozen meals) 3-4 times per week.  She cooks for self and utilizes a .    Patient does the shopping for groceries.      Barriers to a Healthy Diet: family constraints and time/convenience    Patient states that she has been eating worse than she has before. She returned home from vacation a few weeks ago, and has not been able to get back on track with healthier eating habits. Patient states that she has been trying to prepare more meals at home, but continues to struggle with convincing her  to eat what she cooks. Her mother has also been sick, so this keeps her from preparing meals as well.     Mrs. Jose hopes to start eating healthier. She fears her A1c will be higher than before, and takes full responsibility for not maintaining healthy habits. Patient politely declines assistance/education for improving dietary habits. She states that she will do it on her own. Support and encouargement provided. She thanked me.    Physical Activity:   When asked if exercising, patient responded: no    SDOH- deferred    INTERVENTION(S)  encouragement/support    There are no preventive care reminders to display for this patient.    Last 5 Patient Entered Readings                                      Current 30 Day Average: 133/64     Recent Readings 10/22/2019 10/21/2019 10/20/2019 10/19/2019 10/18/2019    SBP (mmHg) 132 112 144 129 136    DBP (mmHg) 61 56 63 62 66    Pulse 71 61 79 75 73        Last 6 Patient Entered Readings                                          Most Recent A1c: 6.9% on 7/25/2019  (Goal:  7%)     Recent Readings 10/23/2019 10/22/2019 10/22/2019 10/21/2019 10/21/2019    Blood Glucose (mg/dL) 199 298 213 268 215

## 2019-10-28 ENCOUNTER — IMMUNIZATION (OUTPATIENT)
Dept: PHARMACY | Facility: CLINIC | Age: 69
End: 2019-10-28
Payer: MEDICARE

## 2019-11-06 ENCOUNTER — CLINICAL SUPPORT (OUTPATIENT)
Dept: INTERNAL MEDICINE | Facility: CLINIC | Age: 69
End: 2019-11-06
Payer: MEDICARE

## 2019-11-06 ENCOUNTER — PATIENT OUTREACH (OUTPATIENT)
Dept: ADMINISTRATIVE | Facility: OTHER | Age: 69
End: 2019-11-06

## 2019-11-06 DIAGNOSIS — J30.9 CHRONIC ALLERGIC RHINITIS: ICD-10-CM

## 2019-11-06 PROCEDURE — 99499 NO LOS: ICD-10-PCS | Mod: HCNC,S$GLB,, | Performed by: ALLERGY & IMMUNOLOGY

## 2019-11-06 PROCEDURE — 99499 UNLISTED E&M SERVICE: CPT | Mod: HCNC,S$GLB,, | Performed by: ALLERGY & IMMUNOLOGY

## 2019-11-06 PROCEDURE — 95115 IMMUNOTHERAPY ONE INJECTION: CPT | Mod: HCNC,S$GLB,, | Performed by: INTERNAL MEDICINE

## 2019-11-06 PROCEDURE — 95115 PR IMMUNOTHERAPY, ONE INJECTION: ICD-10-PCS | Mod: HCNC,S$GLB,, | Performed by: INTERNAL MEDICINE

## 2019-11-08 ENCOUNTER — HOSPITAL ENCOUNTER (OUTPATIENT)
Dept: RADIOLOGY | Facility: HOSPITAL | Age: 69
Discharge: HOME | End: 2019-11-08
Attending: PODIATRIST
Payer: MEDICARE

## 2019-11-08 ENCOUNTER — LAB VISIT (OUTPATIENT)
Dept: LAB | Facility: HOSPITAL | Age: 69
End: 2019-11-08
Attending: NURSE PRACTITIONER
Payer: MEDICARE

## 2019-11-08 ENCOUNTER — OFFICE VISIT (OUTPATIENT)
Dept: PODIATRY | Facility: CLINIC | Age: 69
End: 2019-11-08
Payer: MEDICARE

## 2019-11-08 VITALS
HEIGHT: 62 IN | WEIGHT: 239 LBS | DIASTOLIC BLOOD PRESSURE: 67 MMHG | SYSTOLIC BLOOD PRESSURE: 127 MMHG | BODY MASS INDEX: 43.98 KG/M2 | HEART RATE: 69 BPM

## 2019-11-08 DIAGNOSIS — E55.9 VITAMIN D DEFICIENCY: ICD-10-CM

## 2019-11-08 DIAGNOSIS — M89.8X7 EXOSTOSIS OF TOE: ICD-10-CM

## 2019-11-08 DIAGNOSIS — E89.0 POST-SURGICAL HYPOTHYROIDISM: ICD-10-CM

## 2019-11-08 DIAGNOSIS — E11.42 DIABETIC POLYNEUROPATHY ASSOCIATED WITH TYPE 2 DIABETES MELLITUS: ICD-10-CM

## 2019-11-08 DIAGNOSIS — E11.49 TYPE 2 DIABETES MELLITUS WITH NEUROLOGICAL MANIFESTATIONS: Primary | ICD-10-CM

## 2019-11-08 DIAGNOSIS — M79.672 BILATERAL FOOT PAIN: ICD-10-CM

## 2019-11-08 DIAGNOSIS — M79.671 BILATERAL FOOT PAIN: ICD-10-CM

## 2019-11-08 DIAGNOSIS — R20.9 ALTERED SENSATION, FOOT: ICD-10-CM

## 2019-11-08 LAB
25(OH)D3+25(OH)D2 SERPL-MCNC: 50 NG/ML (ref 30–96)
ALBUMIN SERPL BCP-MCNC: 3.2 G/DL (ref 3.5–5.2)
ALP SERPL-CCNC: 100 U/L (ref 55–135)
ALT SERPL W/O P-5'-P-CCNC: 25 U/L (ref 10–44)
ANION GAP SERPL CALC-SCNC: 9 MMOL/L (ref 8–16)
AST SERPL-CCNC: 24 U/L (ref 10–40)
BILIRUB SERPL-MCNC: 0.4 MG/DL (ref 0.1–1)
BUN SERPL-MCNC: 16 MG/DL (ref 8–23)
CALCIUM SERPL-MCNC: 9.5 MG/DL (ref 8.7–10.5)
CHLORIDE SERPL-SCNC: 103 MMOL/L (ref 95–110)
CO2 SERPL-SCNC: 30 MMOL/L (ref 23–29)
CREAT SERPL-MCNC: 0.8 MG/DL (ref 0.5–1.4)
EST. GFR  (AFRICAN AMERICAN): >60 ML/MIN/1.73 M^2
EST. GFR  (NON AFRICAN AMERICAN): >60 ML/MIN/1.73 M^2
ESTIMATED AVG GLUCOSE: 169 MG/DL (ref 68–131)
GLUCOSE SERPL-MCNC: 163 MG/DL (ref 70–110)
HBA1C MFR BLD HPLC: 7.5 % (ref 4–5.6)
POTASSIUM SERPL-SCNC: 3.5 MMOL/L (ref 3.5–5.1)
PROT SERPL-MCNC: 7.6 G/DL (ref 6–8.4)
SODIUM SERPL-SCNC: 142 MMOL/L (ref 136–145)
TSH SERPL DL<=0.005 MIU/L-ACNC: 1.87 UIU/ML (ref 0.4–4)

## 2019-11-08 PROCEDURE — 3078F DIAST BP <80 MM HG: CPT | Mod: HCNC,CPTII,S$GLB, | Performed by: PODIATRIST

## 2019-11-08 PROCEDURE — 99999 PR PBB SHADOW E&M-EST. PATIENT-LVL III: ICD-10-PCS | Mod: PBBFAC,HCNC,, | Performed by: PODIATRIST

## 2019-11-08 PROCEDURE — 3044F PR MOST RECENT HEMOGLOBIN A1C LEVEL <7.0%: ICD-10-PCS | Mod: HCNC,CPTII,S$GLB, | Performed by: PODIATRIST

## 2019-11-08 PROCEDURE — 84443 ASSAY THYROID STIM HORMONE: CPT | Mod: HCNC

## 2019-11-08 PROCEDURE — 36415 COLL VENOUS BLD VENIPUNCTURE: CPT | Mod: HCNC,PO

## 2019-11-08 PROCEDURE — 99999 PR PBB SHADOW E&M-EST. PATIENT-LVL III: CPT | Mod: PBBFAC,HCNC,, | Performed by: PODIATRIST

## 2019-11-08 PROCEDURE — 82306 VITAMIN D 25 HYDROXY: CPT | Mod: HCNC

## 2019-11-08 PROCEDURE — 73630 X-RAY EXAM OF FOOT: CPT | Mod: 50,TC,HCNC,PN

## 2019-11-08 PROCEDURE — 3074F PR MOST RECENT SYSTOLIC BLOOD PRESSURE < 130 MM HG: ICD-10-PCS | Mod: HCNC,CPTII,S$GLB, | Performed by: PODIATRIST

## 2019-11-08 PROCEDURE — 3044F HG A1C LEVEL LT 7.0%: CPT | Mod: HCNC,CPTII,S$GLB, | Performed by: PODIATRIST

## 2019-11-08 PROCEDURE — 99213 PR OFFICE/OUTPT VISIT, EST, LEVL III, 20-29 MIN: ICD-10-PCS | Mod: HCNC,S$GLB,, | Performed by: PODIATRIST

## 2019-11-08 PROCEDURE — 80053 COMPREHEN METABOLIC PANEL: CPT | Mod: HCNC

## 2019-11-08 PROCEDURE — 3074F SYST BP LT 130 MM HG: CPT | Mod: HCNC,CPTII,S$GLB, | Performed by: PODIATRIST

## 2019-11-08 PROCEDURE — 73630 XR FOOT COMPLETE 3 VIEW BILATERAL: ICD-10-PCS | Mod: 26,50,HCNC, | Performed by: RADIOLOGY

## 2019-11-08 PROCEDURE — 1101F PT FALLS ASSESS-DOCD LE1/YR: CPT | Mod: HCNC,CPTII,S$GLB, | Performed by: PODIATRIST

## 2019-11-08 PROCEDURE — 73630 X-RAY EXAM OF FOOT: CPT | Mod: 26,50,HCNC, | Performed by: RADIOLOGY

## 2019-11-08 PROCEDURE — 1101F PR PT FALLS ASSESS DOC 0-1 FALLS W/OUT INJ PAST YR: ICD-10-PCS | Mod: HCNC,CPTII,S$GLB, | Performed by: PODIATRIST

## 2019-11-08 PROCEDURE — 3078F PR MOST RECENT DIASTOLIC BLOOD PRESSURE < 80 MM HG: ICD-10-PCS | Mod: HCNC,CPTII,S$GLB, | Performed by: PODIATRIST

## 2019-11-08 PROCEDURE — 83036 HEMOGLOBIN GLYCOSYLATED A1C: CPT | Mod: HCNC

## 2019-11-08 PROCEDURE — 99213 OFFICE O/P EST LOW 20 MIN: CPT | Mod: HCNC,S$GLB,, | Performed by: PODIATRIST

## 2019-11-08 NOTE — PROGRESS NOTES
Subjective:      Patient ID: Vivienne Jose is a 69 y.o. female.    Chief Complaint: Diabetic Foot Exam (Dr. Felton 8/5/19)    Vivienne is a 69 y.o. female who presents to the clinic for evaluation and treatment of high risk feet.  Vivienne has a past medical history of Allergy, Angio-edema, Arthritis, Cataract, Cerebrovascular malformation, Colon polyps, Coronary artery disease, Diabetes mellitus, type 2, Diabetic peripheral neuropathy, GERD (gastroesophageal reflux disease), Herpes infection, Hyperlipidemia, Hypertension, Hypothyroidism, Kidney stones, Mild nonproliferative diabetic retinopathy of 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 edema associated with type 2 diabetes mellitus (4/18/2019), Nausea & vomiting (11/23/2015), Obesity, morbid, Ovarian cyst, Pyelonephritis, chronic, Seizure disorder, focal motor, Sleep apnea, Type II or unspecified type diabetes mellitus with neurological manifestations, uncontrolled(250.62), Urinary tract infection, Urticaria, and Vaginal infection.  This patient has documented high risk feet requiring routine maintenance secondary to diabetes mellitis and those secondary complications of diabetes, as mentioned.  History of left big toe total nail P&A matrixectomy. Complains of aching pain to the tip of the both great toes that is not relieved by wearing open toed shoes. Denies trauma.  Also complains of hypersensitivity and diffuse pain underneath the ball of both feet.  She does get pins and needles sensation and both feet while at rest.      PCP: Jay Jay Felton MD    Date Last Seen by PCP: 11/16/18  Current shoe gear:  sandals    Hemoglobin A1C   Date Value Ref Range Status   07/25/2019 6.9 (H) 4.0 - 5.6 % Final     Comment:     ADA Screening Guidelines:  5.7-6.4%  Consistent with prediabetes  >or=6.5%  Consistent with diabetes  High levels of fetal hemoglobin interfere with the HbA1C  assay. Heterozygous hemoglobin variants (HbS, HgC, etc)do  not significantly interfere with this assay.   However,  "presence of multiple variants may affect accuracy.     05/09/2019 6.3 (H) 4.0 - 5.6 % Final     Comment:     ADA Screening Guidelines:  5.7-6.4%  Consistent with prediabetes  >or=6.5%  Consistent with diabetes  High levels of fetal hemoglobin interfere with the HbA1C  assay. Heterozygous hemoglobin variants (HbS, HgC, etc)do  not significantly interfere with this assay.   However, presence of multiple variants may affect accuracy.     02/08/2019 6.9 (H) 4.0 - 5.6 % Final     Comment:     ADA Screening Guidelines:  5.7-6.4%  Consistent with prediabetes  >or=6.5%  Consistent with diabetes  High levels of fetal hemoglobin interfere with the HbA1C  assay. Heterozygous hemoglobin variants (HbS, HgC, etc)do  not significantly interfere with this assay.   However, presence of multiple variants may affect accuracy.       Vitals:    11/08/19 0952   BP: 127/67   Pulse: 69   Weight: 108.4 kg (239 lb)   Height: 5' 2" (1.575 m)   PainSc: 0-No pain      Past Medical History:   Diagnosis Date    Allergy     Angio-edema     Arthritis     Cataract     Cerebrovascular malformation     Colon polyps     Coronary artery disease     Diabetes mellitus, type 2     Diabetic peripheral neuropathy     GERD (gastroesophageal reflux disease)     Herpes infection     Hyperlipidemia     Hypertension     Hypothyroidism     Kidney stones     Mild nonproliferative diabetic retinopathy of 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 edema associated with type 2 diabetes mellitus 4/18/2019    Nausea & vomiting 11/23/2015    Obesity, morbid     Ovarian cyst     Pyelonephritis, chronic     Seizure disorder, focal motor     Sleep apnea     Type II or unspecified type diabetes mellitus with neurological manifestations, uncontrolled(250.62)     Urinary tract infection     Urticaria     Vaginal infection        Past Surgical History:   Procedure Laterality Date    CARPAL TUNNEL RELEASE Right 2014    COLONOSCOPY      COLONOSCOPY N/A 9/13/2017    " Procedure: COLONOSCOPY Golytely;  Surgeon: Gisela Wall MD;  Location: [facility] ENDO;  Service: Endoscopy;  Laterality: N/A;    CYST REMOVAL      skin; multiples    EXTRACORPOREAL SHOCK WAVE LITHOTRIPSY  2002    HYSTERECTOMY  1984    JOINT REPLACEMENT Right 03/06/2019    knee    KIDNEY STONE SURGERY      KNEE ARTHROPLASTY Right 3/6/2019    Procedure: ARTHROPLASTY, KNEE;  Surgeon: Pj Gamboa MD;  Location: [facility] OR;  Service: Orthopedics;  Laterality: Right;  Depuy (Stephen notified 2/21, CC)    THYROIDECTOMY  1977         TONSILLECTOMY, ADENOIDECTOMY      TRIGGER FINGER RELEASE      TUBAL LIGATION         Family History   Problem Relation Age of Onset    Cancer Father     Arthritis Father     Gout Father     Allergies Son     Allergic rhinitis Son     Skin cancer Mother     Macular degeneration Mother     Dementia Mother     Hypertension Mother     No Known Problems Daughter     Diabetes Daughter     No Known Problems Daughter     Glaucoma Maternal Aunt         Great Maternal Aunt    Leukemia Maternal Uncle     Amblyopia Neg Hx     Cataracts Neg Hx     Retinal detachment Neg Hx     Strabismus Neg Hx     Stroke Neg Hx     Thyroid disease Neg Hx     Kidney disease Neg Hx     Angioedema Neg Hx     Asthma Neg Hx     Atopy Neg Hx     Eczema Neg Hx     Immunodeficiency Neg Hx     Rhinitis Neg Hx     Urticaria Neg Hx        Social History     Socioeconomic History    Marital status:      Spouse name: Not on file    Number of children: Not on file    Years of education: Not on file    Highest education level: Not on file   Occupational History    Occupation: retired     Employer: [employer]   Social Needs    Financial resource strain: Not hard at all    Food insecurity:     Worry: Never true     Inability: Never true    Transportation needs:     Medical: No     Non-medical: No   Tobacco Use    Smoking status: Never Smoker    Smokeless tobacco: Never  Used   Substance and Sexual Activity    Alcohol use: No     Alcohol/week: 0.0 standard drinks     Frequency: Never     Drinks per session: Patient refused     Binge frequency: Never    Drug use: No    Sexual activity: Yes     Partners: Male   Lifestyle    Physical activity:     Days per week: 0 days     Minutes per session: 0 min    Stress: Not at all   Relationships    Social connections:     Talks on phone: Once a week     Gets together: Never     Attends Worship service: Not on file     Active member of club or organization: No     Attends meetings of clubs or organizations: Never     Relationship status:    Other Topics Concern    Are you pregnant or think you may be? Not Asked    Breast-feeding Not Asked   Social History Narrative    Not on file       Current Outpatient Medications   Medication Sig Dispense Refill    amLODIPine (NORVASC) 5 MG tablet Take 1 tablet (5 mg total) by mouth every evening. 90 tablet 1    aspirin (ECOTRIN) 81 MG EC tablet Take 81 mg by mouth once daily.      azelastine (ASTELIN) 137 mcg (0.1 %) nasal spray 2 sprays (274 mcg total) by Nasal route 2 (two) times daily. 90 mL 4    blood sugar diagnostic (TRUE METRIX GLUCOSE TEST STRIP) Strp TEST TWO TIMES DAILY 200 strip 6    blood-glucose meter Misc [employer] True Metrix Air meter 1 each 0    calcium carbonate-vitamin D3 600 mg(1,500mg) -100 unit Cap Take 1 tablet by mouth once daily.      cefpodoxime (VANTIN) 200 MG tablet Take 1 tablet (200 mg total) by mouth 2 (two) times daily. 14 tablet 0    chlorthalidone (HYGROTEN) 25 MG Tab Take 1 tablet (25 mg total) by mouth once daily. 90 tablet 1    cranberry 500 mg Cap Take by mouth.      flu vacc [id number],65yr up,PF (FLUZONE HIGH-DOSE 2019-20, PF,) 180 mcg/0.5 mL Syrg Inject 0.5 mLs into the muscle once. for 1 dose 0.5 mL 0    fluticasone (FLONASE) 50 mcg/actuation nasal spray 2 sprays (100 mcg total) by Each Nare route once daily. 48 g 4    gabapentin (NEURONTIN)  "600 MG tablet Take 1 tablet (600 mg total) by mouth 2 (two) times daily. 180 tablet 1    glucagon (human recombinant) inj 1mg/mL kit Inject 1 mL (1 mg total) into the muscle as needed. 1 kit 6    insulin degludec (TRESIBA FLEXTOUCH U-100) 100 unit/mL (3 mL) InPn Inject 30 Units into the skin every evening. 15 Syringe 4    ketotifen (ZADITOR) 0.025 % (0.035 %) ophthalmic solution Place 1-2 drops into both eyes once daily.      L.acid-B.bifidum-B.animal-FOS (PROBIOTIC COMPLEX) 25 billion cell -100 mg Cap Take 1 capsule by mouth once daily.      lancets (TRUEPLUS LANCETS) 28 gauge Misc Inject 1 lancet into the skin 2 (two) times daily before meals. 200 each 6    levothyroxine (SYNTHROID) 75 MCG tablet Take 1 tablet (75 mcg total) by mouth once daily. 90 tablet 1    liraglutide 0.6 mg/0.1 mL, 18 mg/3 mL, subq PNIJ (VICTOZA 3-TAWANA) 0.6 mg/0.1 mL (18 mg/3 mL) PnIj INJECT 1.8 MG INTO THE SKIN ONCE DAILY. 27 mL 11    loratadine (CLARITIN) 10 mg tablet Take 10 mg by mouth once daily.      lovastatin (MEVACOR) 40 MG tablet TAKE 1 TABLET NIGHTLY (SUBSTITUTED FOR MEVACOR) 90 tablet 1    magnesium oxide-Mg AA chelate (MAGNESIUM, AMINO ACID CHELATE,) 133 mg Tab Take by mouth as directed.       metFORMIN (GLUCOPHAGE) 1000 MG tablet Take 1 tablet (1,000 mg total) by mouth 2 (two) times daily with meals. 180 tablet 3    omeprazole (PRILOSEC) 20 MG capsule Take 1 capsule (20 mg total) by mouth daily as needed. 90 capsule 1    pen needle, diabetic (BD ULTRA-FINE EDUARDO PEN NEEDLE) 32 gauge x 5/32" Ndle USE AS DIRECTED 200 each 6    potassium chloride SA (K-DUR,KLOR-CON) 10 MEQ tablet TAKE 1 TABLET EVERY DAY  EXCEPT TAKE 2 TABLETS ON MONDAY, WEDNESDAY AND FRIDAY 130 tablet 1    PREMARIN vaginal cream Place 0.5 g vaginally 3 (three) times a week. 30 g 3    valACYclovir (VALTREX) 500 MG tablet Take 1 tablet (500 mg total) by mouth once daily. 90 tablet 1    valsartan (DIOVAN) 160 MG tablet Take 1 tablet (160 mg total) by " mouth 2 (two) times daily. 180 tablet 1     No current facility-administered medications for this visit.        Allergies   Allergen Reactions    Sulfa (Sulfonamide Antibiotics) Nausea Only    Ciprofloxacin     Januvia [Sitagliptin]      abd pain    Lisinopril     Lotensin [Benazepril]     Nexium [Esomeprazole Magnesium] Other (See Comments)     Gas         Review of Systems   Constitution: Negative for chills, fever and malaise/fatigue.   HENT: Negative for congestion and hearing loss.    Cardiovascular: Negative for chest pain, claudication and leg swelling.   Respiratory: Negative for cough and shortness of breath.    Skin: Positive for nail changes. Negative for color change, dry skin and itching.   Musculoskeletal: Positive for back pain. Negative for joint pain, muscle cramps and muscle weakness.   Gastrointestinal: Negative for nausea and vomiting.   Neurological: Positive for numbness and paresthesias. Negative for weakness.   Psychiatric/Behavioral: Negative for altered mental status.           Objective:      Physical Exam   Constitutional: She is oriented to person, place, and time. She appears well-nourished. No distress.   Cardiovascular: Intact distal pulses.   Pulses:       Dorsalis pedis pulses are 2+ on the right side, and 2+ on the left side.        Posterior tibial pulses are 2+ on the right side, and 2+ on the left side.   CFT< 3 secs all toes bilateral foot, skin temp warm bilateral foot, no digital hair growth bilateral foot, no lower extremity edema bilateral.       Musculoskeletal:        Right foot: There is no deformity.        Left foot: There is no deformity.   Slight pain on palpation distal intermetatarsal spaces 2, 3 and 4 left foot. Gastrocnemius equinus bilateral. Rectus foot type with adductovarus rotation of fifth toe bilateral. No pain with ROM or MMT bilateral foot.    Palpable enlargement of the distal hallux bilateral with mild localized pain, no discoloration or edema.      Negative Lachman test toes 2-5 bilateral foot.   Feet:   Right Foot:   Protective Sensation: 10 sites tested. 10 sites sensed.   Skin Integrity: Negative for ulcer, blister, skin breakdown, erythema, warmth, callus or dry skin.   Left Foot:   Protective Sensation: 10 sites tested. 10 sites sensed.   Skin Integrity: Negative for ulcer, blister, skin breakdown, erythema, warmth, callus or dry skin.   Neurological: She is alert and oriented to person, place, and time. She has normal strength. No sensory deficit.   Vibratory sensation intact bilateral foot. Negative Tinel sign and War pain on palpation overlying the superficial peroneal nerve, deep peroneal nerve, intermediate dorsal and dorsal medial cutaneous nerves, tarsal tunnel bilateral lower extremity.       Skin: Skin is warm, dry and intact. Capillary refill takes less than 2 seconds. No ecchymosis and no rash noted. She is not diaphoretic. No cyanosis or erythema. No pallor. Nails show no clubbing.   Bilateral hallux nail bed with healthy appearing skin. Small nail spicule along the medial left hallux nail bed, no pain, no soi.    Remaining nails 2-5 b/l normotrophic and trimmed.     No open lesions or macerations bilateral lower extremity.                     Assessment:       Encounter Diagnoses   Name Primary?    Type 2 diabetes mellitus with neurological manifestations Yes    Altered sensation, foot     Exostosis of toe     Bilateral foot pain          Plan:       Vivienne was seen today for diabetic foot exam.    Diagnoses and all orders for this visit:    Type 2 diabetes mellitus with neurological manifestations    Altered sensation, foot    Exostosis of toe  -     X-Ray Foot Complete 3 view Bilateral; Future    Bilateral foot pain  -     X-Ray Foot Complete 3 view Bilateral; Future      I counseled the patient on her conditions, their implications and medical management.    Shoe inspection. Diabetic Foot Education. Patient reminded of the  importance of good nutrition and blood sugar control to help prevent podiatric complications of diabetes. Patient instructed on proper foot hygeine. We discussed wearing proper shoe gear, daily foot inspections, never walking without protective shoe gear, never putting sharp instruments to feet, routine podiatric nail visits every 2-3 months. Discussed shoes with adequate toe box space to prevent excessive pressure and rubbing along the toenails.     Follow-up x-ray ordered bilateral foot to assess the exostosis of the hallux bilateral. If there is significant enlarged may want consider surgical resection with biopsy.  Discussed importance of wearing shoes with adequate room in the toe box prevent irritation to the ends of the toes.    Also discussed that metformin use over time can cause thiamine deficiency which can worsened peripheral neuropathy and could lead to hypersensitivity and or pain that she is experiencing currently.  May want discussed with endocrinology or her primary care physician in order to obtain a different medication.  Also recommend complex B vitamin be taken daily.    RTC 1 year or prn as discussed.

## 2019-11-08 NOTE — PATIENT INSTRUCTIONS
Prescribed compound analgesic cream from [facility] to be applied as needed. If not covered use Apercreme with 4% lidocaine as needed available over the counter.    X-ray ordered to assess big toes.

## 2019-11-11 ENCOUNTER — IMMUNIZATION (OUTPATIENT)
Dept: PHARMACY | Facility: CLINIC | Age: 69
End: 2019-11-11
Payer: MEDICARE

## 2019-11-11 ENCOUNTER — PATIENT MESSAGE (OUTPATIENT)
Dept: PODIATRY | Facility: CLINIC | Age: 69
End: 2019-11-11

## 2019-11-13 ENCOUNTER — PATIENT OUTREACH (OUTPATIENT)
Dept: OTHER | Facility: OTHER | Age: 69
End: 2019-11-13

## 2019-11-13 NOTE — PROGRESS NOTES
Subjective:      Patient ID: Vivienne Jose is a 69 y.o. female.    Chief Complaint:  Diabetes    History of Present Illness  Vivienne Jose is presenting today for DM type 2 & hypothyroidism follow up.     She is having a right knee replacement in March.     With regards to the diabetes:  Diagnosed with DM in her early 50s   She was told she had prediabetes in her 30s     Since her last visit she admits to eating terribly     Current regimen:  Tresiba 30u HS   Victoza 1.8 mg qd  Metformin 1000 mg BID    Missed doses? No     2 times a day testing  Log reviewed: none  High because she has been eating bad.      Eats 3 meals a day.  Snacks rarely  Drinks water    Exercise - going to try to go to yoga      Hypoglycemic event? None   Corrects with juice   Has glucagon emergency kit     Known complications PN and nephropathy    Education - last visit: 05/2015     She consistently gets UTI's     Diabetes Management Status  Statin: Taking  ACE/ARB: Taking  Screening or Prevention Patient's value Goal Complete/Controlled?   HgA1C Testing and Control   Lab Results   Component Value Date    HGBA1C 7.5 (H) 11/08/2019    Annually/Less than 7.5% Yes   Lipid profile : 05/09/2019 Annually Yes   LDL control Lab Results   Component Value Date    LDLCALC 78.8 05/09/2019    Annually/Less than 100 mg/dl  Yes   Nephropathy screening Lab Results   Component Value Date    LABMICR 13.0 05/09/2019     Lab Results   Component Value Date    PROTEINUA Negative 05/09/2019    Annually Yes   Blood pressure BP Readings from Last 1 Encounters:   11/15/19 122/74    Less than 140/90 Yes   Dilated retinal exam : 05/10/2019 Annually Yes   Foot exam   : 11/08/2019 Annually Yes     With regards to her hypothyroidism:  In 1978 she had right lobectomy for goiter and multiple cysts     Current medication:  generic lt4 75 mcg      Ref. Range 11/8/2019 08:34   TSH Latest Ref Range: 0.400 - 4.000 uIU/mL 1.867     takes thyroid medication properly without food  first thing in the morning.     current symptoms:   Denies weight gain  Denies Fatigue   Denies Constipation   Denies Hair loss  Denies Brittle nails  Denies Mental fog     Last BMD dated 01/5/2018  Impression    Borderline osteopenia of the lumbar spine (slightly improved from prior exam) and osteopenia of the femoral necks (slightly decreased from prior exam).      Ref. Range 7/27/2018 07:24   Vit D, 25-Hydroxy Latest Ref Range: 30 - 96 ng/mL 41     Has BAM using cpap  Last thyroid US in 2018  FINDINGS:  History of thyroidectomy.    Right thyroid lobe measures 2.6 x 1.7 x 1.2 cm.  Parenchyma is homogeneous.  No microcalcifications.    Left thyroid lobe measures 3.4 x 1.3 x 1.5 cm.  Hypoechoic nodule measuring 1.1 cm and subcentimeter cystic nodule noted.    Isthmus measures 0.3 cm. Parenchyma is homogeneous.  No microcalcifications.    No evidence for cervical lymphadenopathy.      Impression     Residual thyroid, status post thyroidectomy.  Left thyroid nodule not meeting criteria for fine-needle aspiration.     Review of Systems   Constitutional: Negative for unexpected weight change.   Eyes: Negative for visual disturbance.   Respiratory: Negative for shortness of breath.    Cardiovascular: Negative for chest pain.   Gastrointestinal: Negative for abdominal pain.   Endocrine: Negative for cold intolerance, heat intolerance, polydipsia, polyphagia and polyuria.   Musculoskeletal: Negative for arthralgias and gait problem.   Skin: Negative for wound.   Neurological: Negative for headaches.   Hematological: Does not bruise/bleed easily.   Psychiatric/Behavioral: Negative for sleep disturbance.     Objective:   Physical Exam   Neck: No thyromegaly present.   Cardiovascular: Normal rate.   No edema present   Pulmonary/Chest: Effort normal.   Abdominal: Soft.   Vitals reviewed.  Foot exam deferred done 11/2019  injection sites are ok. No lipo hypertropthy or atrophy    Body mass index is 42.16 kg/m².    Lab Review:    Lab Results   Component Value Date    HGBA1C 7.5 (H) 11/08/2019     Lab Results   Component Value Date    CHOL 147 05/09/2019    HDL 49 05/09/2019    LDLCALC 78.8 05/09/2019    TRIG 96 05/09/2019    CHOLHDL 33.3 05/09/2019     Lab Results   Component Value Date     11/08/2019    K 3.5 11/08/2019     11/08/2019    CO2 30 (H) 11/08/2019     (H) 11/08/2019    BUN 16 11/08/2019    CREATININE 0.8 11/08/2019    CALCIUM 9.5 11/08/2019    PROT 7.6 11/08/2019    ALBUMIN 3.2 (L) 11/08/2019    BILITOT 0.4 11/08/2019    ALKPHOS 100 11/08/2019    AST 24 11/08/2019    ALT 25 11/08/2019    ANIONGAP 9 11/08/2019    ESTGFRAFRICA >60.0 11/08/2019    EGFRNONAA >60.0 11/08/2019    TSH 1.867 11/08/2019     Assessment and Plan     1. Type 2 diabetes mellitus with stage 3 chronic kidney disease, with long-term current use of insulin  Hemoglobin A1c    Comprehensive metabolic panel    Lipid panel    Microalbumin/creatinine urine ratio    glipiZIDE (GLUCOTROL) 5 MG tablet      Diabetic polyneuropathy associated with type 2 diabetes mellitus  -- Continue gabapentin and following with podiatry.  -- Educated patient on proper comfortable foot wear, to always wear dry socks, never walk barefoot, never cut toenails too short, never test bath water with feet, encouraged patient to inspect feet daily for wounds., and if patient applies lotion to this feet to always go around the foot and not in between toes.     Type 2 diabetes mellitus with stage 3 chronic kidney disease, with long-term current use of insulin  - A1c goal 7%.   -Reviewed goals of therapy are to get the best control we can without hypoglycemia  - Discussed diet and exercise at length today   Medication changes:   Continue: Metformin & victoza & tresiba 30u HS.  Start Glipizide 5 mg BID AC    - Reviewed patient's current insulin regimen. Clarified proper insulin dose and timing in relation to meals, etc. Insulin injection sites and proper rotation instructed.      -Advised frequent self blood glucose monitoring.  Patient encouraged to document glucose results and bring them to every clinic visit    -Hypoglycemia precautions discussed. Instructed on precautions before driving.    -Close adherence to lifestyle changes recommended.   -Periodic follow ups for eye evaluations, foot care and dental care suggested.    Post-surgical hypothyroidism  - Clinically and biochemically euthyroid  - Continue lt4 75 mcg daily   - Goal is a normal TSH  - Check TFTs and adjust dosage accordingly   - Avoid exogenous hyperthyroidism as this can accelerate bone loss and increase risk of CV complications.  - Advised to take LT4 on an empty stomach with water and to wait 30-45 minutes before eating or taking other medications   - Reviewed that symptoms of hypothyroidism may not correlate with tsh, and a normal TSH is the goal of therapy.....  symptoms are not a justification for over treatment     BAM on CPAP  - Continue CPAP use     Osteopenia  -- borderline osteopenic  -- continue Ca & vit D supplement   -- next BMD due in 2020     Hypertension associated with diabetes  - Tolerating ARB  - Controlled     Dyslipidemia associated with type 2 diabetes mellitus  - Controlled   -  statin per ADA recommendations    Morbid obesity with BMI of 40.0-44.9, adult  -- encouraged dietary and lifestyle modifications   -- emphasized weight loss goals     Vitamin D deficiency  - continue over the counter 2000 IU a day      Follow up in about 4 months (around 3/15/2020).  Labs prior to next appointment with me

## 2019-11-15 ENCOUNTER — OFFICE VISIT (OUTPATIENT)
Dept: ENDOCRINOLOGY | Facility: CLINIC | Age: 69
End: 2019-11-15
Payer: MEDICARE

## 2019-11-15 VITALS
HEART RATE: 80 BPM | SYSTOLIC BLOOD PRESSURE: 122 MMHG | HEIGHT: 63 IN | BODY MASS INDEX: 42.17 KG/M2 | DIASTOLIC BLOOD PRESSURE: 74 MMHG | WEIGHT: 238 LBS

## 2019-11-15 DIAGNOSIS — E11.22 TYPE 2 DIABETES MELLITUS WITH STAGE 3 CHRONIC KIDNEY DISEASE, WITH LONG-TERM CURRENT USE OF INSULIN: Primary | ICD-10-CM

## 2019-11-15 DIAGNOSIS — Z79.4 TYPE 2 DIABETES MELLITUS WITH STAGE 3 CHRONIC KIDNEY DISEASE, WITH LONG-TERM CURRENT USE OF INSULIN: Primary | ICD-10-CM

## 2019-11-15 DIAGNOSIS — N18.30 TYPE 2 DIABETES MELLITUS WITH STAGE 3 CHRONIC KIDNEY DISEASE, WITH LONG-TERM CURRENT USE OF INSULIN: Primary | ICD-10-CM

## 2019-11-15 PROCEDURE — 3078F DIAST BP <80 MM HG: CPT | Mod: HCNC,CPTII,S$GLB, | Performed by: NURSE PRACTITIONER

## 2019-11-15 PROCEDURE — 99214 PR OFFICE/OUTPT VISIT, EST, LEVL IV, 30-39 MIN: ICD-10-PCS | Mod: HCNC,S$GLB,, | Performed by: NURSE PRACTITIONER

## 2019-11-15 PROCEDURE — 3074F PR MOST RECENT SYSTOLIC BLOOD PRESSURE < 130 MM HG: ICD-10-PCS | Mod: HCNC,CPTII,S$GLB, | Performed by: NURSE PRACTITIONER

## 2019-11-15 PROCEDURE — 1101F PR PT FALLS ASSESS DOC 0-1 FALLS W/OUT INJ PAST YR: ICD-10-PCS | Mod: HCNC,CPTII,S$GLB, | Performed by: NURSE PRACTITIONER

## 2019-11-15 PROCEDURE — 3074F SYST BP LT 130 MM HG: CPT | Mod: HCNC,CPTII,S$GLB, | Performed by: NURSE PRACTITIONER

## 2019-11-15 PROCEDURE — 1101F PT FALLS ASSESS-DOCD LE1/YR: CPT | Mod: HCNC,CPTII,S$GLB, | Performed by: NURSE PRACTITIONER

## 2019-11-15 PROCEDURE — 99999 PR PBB SHADOW E&M-EST. PATIENT-LVL III: CPT | Mod: PBBFAC,HCNC,, | Performed by: NURSE PRACTITIONER

## 2019-11-15 PROCEDURE — 99214 OFFICE O/P EST MOD 30 MIN: CPT | Mod: HCNC,S$GLB,, | Performed by: NURSE PRACTITIONER

## 2019-11-15 PROCEDURE — 3078F PR MOST RECENT DIASTOLIC BLOOD PRESSURE < 80 MM HG: ICD-10-PCS | Mod: HCNC,CPTII,S$GLB, | Performed by: NURSE PRACTITIONER

## 2019-11-15 PROCEDURE — 99999 PR PBB SHADOW E&M-EST. PATIENT-LVL III: ICD-10-PCS | Mod: PBBFAC,HCNC,, | Performed by: NURSE PRACTITIONER

## 2019-11-15 RX ORDER — GLIPIZIDE 5 MG/1
5 TABLET ORAL
Qty: 60 TABLET | Refills: 11 | Status: SHIPPED | OUTPATIENT
Start: 2019-11-15 | End: 2020-02-07 | Stop reason: SDUPTHER

## 2019-11-15 NOTE — PROGRESS NOTES
Digital Medicine: Clinician Follow-Up        Follow Up  Follow-up reason(s): reading review      Routine Education Topics: hypoglycemia  Patient was seen by endo today and glipizide 5 mg BID was prescribed. Reviewed side effect of hypoglycemia and how to treat it. She understands she should only take this before breakfast and dinner given the risk of hypoglycemia if she does not eat.     BP readings are beginning to trend down.           Per 30 day average, 134/62 mmHg, patient's BP is approaching goal. A1C has increased and SMBG readings remain above goal.    Will monitor blood glucose on new regimen. If BP remains above goal, will increase amlodipine.     Will continue to monitor regularly. Will follow up in 3-4 weeks, sooner if BP begins to trend upward or downward.    Asked patient to call or message with questions or concerns.       There are no preventive care reminders to display for this patient.    Last 5 Patient Entered Readings                                      Current 30 Day Average: 134/62     Recent Readings 11/14/2019 11/12/2019 11/11/2019 11/10/2019 11/9/2019    SBP (mmHg) 132 130 135 131 134    DBP (mmHg) 63 67 64 69 61    Pulse 64 71 64 66 68        Last 6 Patient Entered Readings                                          Most Recent A1c: 7.5% on 11/8/2019  (Goal: 7%)     Recent Readings 11/15/2019 11/14/2019 11/14/2019 11/13/2019 11/13/2019    Blood Glucose (mg/dL) 146 241 190 201 179           Hypertension Medications             amLODIPine (NORVASC) 5 MG tablet Take 1 tablet (5 mg total) by mouth every evening.    chlorthalidone (HYGROTEN) 25 MG Tab Take 1 tablet (25 mg total) by mouth once daily.    valsartan (DIOVAN) 160 MG tablet Take 1 tablet (160 mg total) by mouth 2 (two) times daily.        Diabetes Medications             insulin degludec (TRESIBA FLEXTOUCH U-100) 100 unit/mL (3 mL) InPn Inject 30 Units into the skin every evening.    liraglutide 0.6 mg/0.1 mL, 18 mg/3 mL, subq PNIJ  (VICTOZA 3-[place]) 0.6 mg/0.1 mL (18 mg/3 mL) PnIj INJECT 1.8 MG INTO THE SKIN ONCE DAILY.    metFORMIN (GLUCOPHAGE) 1000 MG tablet Take 1 tablet (1,000 mg total) by mouth 2 (two) times daily with meals.    glipiZIDE (GLUCOTROL) 5 MG tablet Take 1 tablet (5 mg total) by mouth 2 (two) times daily before meals.    glucagon (human recombinant) inj 1mg/mL kit Inject 1 mL (1 mg total) into the muscle as needed.               Screenings

## 2019-11-19 NOTE — TELEPHONE ENCOUNTER
Notify pt. Diabetic eye camera shows diabetic changes; Have pt. Schedule appt. With her eye doctor for further evaluation; please let me know if she need me to refer her to an eye doctor.    negative...

## 2019-11-25 ENCOUNTER — CLINICAL SUPPORT (OUTPATIENT)
Dept: INTERNAL MEDICINE | Facility: CLINIC | Age: 69
End: 2019-11-25
Payer: MEDICARE

## 2019-11-25 DIAGNOSIS — Z51.6 ALLERGY DESENSITIZATION THERAPY: ICD-10-CM

## 2019-11-25 PROCEDURE — 99499 UNLISTED E&M SERVICE: CPT | Mod: HCNC,S$GLB,, | Performed by: ALLERGY & IMMUNOLOGY

## 2019-11-25 PROCEDURE — 95115 IMMUNOTHERAPY ONE INJECTION: CPT | Mod: HCNC,S$GLB,, | Performed by: FAMILY MEDICINE

## 2019-11-25 PROCEDURE — 99499 NO LOS: ICD-10-PCS | Mod: HCNC,S$GLB,, | Performed by: ALLERGY & IMMUNOLOGY

## 2019-11-25 PROCEDURE — 95115 PR IMMUNOTHERAPY, ONE INJECTION: ICD-10-PCS | Mod: HCNC,S$GLB,, | Performed by: FAMILY MEDICINE

## 2019-12-04 ENCOUNTER — OFFICE VISIT (OUTPATIENT)
Dept: URGENT CARE | Facility: CLINIC | Age: 69
End: 2019-12-04
Payer: MEDICARE

## 2019-12-04 VITALS
OXYGEN SATURATION: 95 % | SYSTOLIC BLOOD PRESSURE: 150 MMHG | HEIGHT: 63 IN | WEIGHT: 237 LBS | RESPIRATION RATE: 18 BRPM | DIASTOLIC BLOOD PRESSURE: 58 MMHG | BODY MASS INDEX: 41.99 KG/M2 | TEMPERATURE: 99 F | HEART RATE: 76 BPM

## 2019-12-04 DIAGNOSIS — J01.01 ACUTE RECURRENT MAXILLARY SINUSITIS: ICD-10-CM

## 2019-12-04 DIAGNOSIS — J02.9 SORE THROAT: Primary | ICD-10-CM

## 2019-12-04 LAB
CTP QC/QA: YES
S PYO RRNA THROAT QL PROBE: NEGATIVE

## 2019-12-04 PROCEDURE — 96372 PR INJECTION,THERAP/PROPH/DIAG2ST, IM OR SUBCUT: ICD-10-PCS | Mod: S$GLB,,, | Performed by: FAMILY MEDICINE

## 2019-12-04 PROCEDURE — 99214 PR OFFICE/OUTPT VISIT, EST, LEVL IV, 30-39 MIN: ICD-10-PCS | Mod: 25,S$GLB,, | Performed by: FAMILY MEDICINE

## 2019-12-04 PROCEDURE — 99214 OFFICE O/P EST MOD 30 MIN: CPT | Mod: 25,S$GLB,, | Performed by: FAMILY MEDICINE

## 2019-12-04 PROCEDURE — 96372 THER/PROPH/DIAG INJ SC/IM: CPT | Mod: S$GLB,,, | Performed by: FAMILY MEDICINE

## 2019-12-04 PROCEDURE — 87880 STREP A ASSAY W/OPTIC: CPT | Mod: QW,S$GLB,, | Performed by: FAMILY MEDICINE

## 2019-12-04 PROCEDURE — 87880 POCT RAPID STREP A: ICD-10-PCS | Mod: QW,S$GLB,, | Performed by: FAMILY MEDICINE

## 2019-12-04 RX ORDER — BETAMETHASONE SODIUM PHOSPHATE AND BETAMETHASONE ACETATE 3; 3 MG/ML; MG/ML
6 INJECTION, SUSPENSION INTRA-ARTICULAR; INTRALESIONAL; INTRAMUSCULAR; SOFT TISSUE
Status: COMPLETED | OUTPATIENT
Start: 2019-12-04 | End: 2019-12-04

## 2019-12-04 RX ORDER — BENZONATATE 100 MG/1
100 CAPSULE ORAL 3 TIMES DAILY PRN
Qty: 30 CAPSULE | Refills: 0 | Status: SHIPPED | OUTPATIENT
Start: 2019-12-04 | End: 2019-12-13 | Stop reason: SDUPTHER

## 2019-12-04 RX ORDER — AZITHROMYCIN 250 MG/1
TABLET, FILM COATED ORAL
Qty: 6 TABLET | Refills: 0 | Status: SHIPPED | OUTPATIENT
Start: 2019-12-04 | End: 2019-12-12

## 2019-12-04 RX ADMIN — BETAMETHASONE SODIUM PHOSPHATE AND BETAMETHASONE ACETATE 6 MG: 3; 3 INJECTION, SUSPENSION INTRA-ARTICULAR; INTRALESIONAL; INTRAMUSCULAR; SOFT TISSUE at 07:12

## 2019-12-05 NOTE — PROGRESS NOTES
"Subjective:       Patient ID: Vivienne Jose is a 69 y.o. female.    Vitals:  height is 5' 3" (1.6 m) and weight is 107.5 kg (237 lb). Her temperature is 99.2 °F (37.3 °C). Her blood pressure is 150/58 (abnormal) and her pulse is 76. Her respiration is 18 and oxygen saturation is 95%.     Chief Complaint: Sore Throat    Pt said her symptoms started 5 days ago. Sinus congestion,pnd,cough with productive sputum    Sore Throat    This is a new problem. The current episode started in the past 7 days. The problem has been gradually worsening. There has been no fever. The pain is at a severity of 1/10. The pain is mild. Associated symptoms include coughing. Pertinent negatives include no congestion, ear pain, shortness of breath, stridor or vomiting. She has had no exposure to strep or mono. Treatments tried: mucinex. The treatment provided no relief.       Constitution: Negative for chills, sweating, fatigue and fever.   HENT: Positive for sore throat. Negative for ear pain, congestion, sinus pain, sinus pressure and voice change.    Neck: Negative for painful lymph nodes.   Eyes: Negative for eye redness.   Respiratory: Positive for chest tightness and cough. Negative for sputum production, bloody sputum, COPD, shortness of breath, stridor, wheezing and asthma.    Gastrointestinal: Negative for nausea and vomiting.   Musculoskeletal: Negative for muscle ache.   Skin: Negative for rash.   Allergic/Immunologic: Negative for seasonal allergies and asthma.   Hematologic/Lymphatic: Negative for swollen lymph nodes.       Objective:      Physical Exam   Constitutional: She is oriented to person, place, and time. She appears well-developed and well-nourished. She is cooperative.  Non-toxic appearance. She does not appear ill. No distress.   HENT:   Head: Normocephalic and atraumatic.   Right Ear: Hearing, external ear and ear canal normal. Tympanic membrane is injected and bulging. A middle ear effusion is present.   Left Ear: " Hearing, external ear and ear canal normal. Tympanic membrane is injected and bulging. A middle ear effusion is present.   Nose: No mucosal edema, rhinorrhea or nasal deformity. No epistaxis. Right sinus exhibits maxillary sinus tenderness. Right sinus exhibits no frontal sinus tenderness. Left sinus exhibits maxillary sinus tenderness. Left sinus exhibits no frontal sinus tenderness.   Mouth/Throat: Uvula is midline, oropharynx is clear and moist and mucous membranes are normal. No trismus in the jaw. Normal dentition. No uvula swelling. No posterior oropharyngeal erythema.   Eyes: Conjunctivae and lids are normal. Right eye exhibits no discharge. Left eye exhibits no discharge. No scleral icterus.   Neck: Trachea normal, normal range of motion, full passive range of motion without pain and phonation normal. Neck supple.   Cardiovascular: Normal rate, regular rhythm, normal heart sounds, intact distal pulses and normal pulses.   Pulmonary/Chest: Effort normal and breath sounds normal. No respiratory distress.   Abdominal: Soft. Normal appearance and bowel sounds are normal. She exhibits no distension, no pulsatile midline mass and no mass. There is no tenderness.   Musculoskeletal: Normal range of motion. She exhibits no edema or deformity.   Neurological: She is alert and oriented to person, place, and time. She exhibits normal muscle tone. Coordination normal.   Skin: Skin is warm, dry, intact, not diaphoretic and not pale.   Psychiatric: She has a normal mood and affect. Her speech is normal and behavior is normal. Judgment and thought content normal. Cognition and memory are normal.   Nursing note and vitals reviewed.        Assessment:       No diagnosis found.    Plan:         There are no diagnoses linked to this encounter.

## 2019-12-12 ENCOUNTER — PATIENT MESSAGE (OUTPATIENT)
Dept: UROLOGY | Facility: CLINIC | Age: 69
End: 2019-12-12

## 2019-12-12 ENCOUNTER — OFFICE VISIT (OUTPATIENT)
Dept: SLEEP MEDICINE | Facility: CLINIC | Age: 69
End: 2019-12-12
Payer: MEDICARE

## 2019-12-12 ENCOUNTER — LAB VISIT (OUTPATIENT)
Dept: LAB | Facility: HOSPITAL | Age: 69
End: 2019-12-12
Attending: UROLOGY
Payer: MEDICARE

## 2019-12-12 VITALS
WEIGHT: 237.19 LBS | SYSTOLIC BLOOD PRESSURE: 144 MMHG | HEART RATE: 77 BPM | BODY MASS INDEX: 42.03 KG/M2 | HEIGHT: 63 IN | DIASTOLIC BLOOD PRESSURE: 69 MMHG

## 2019-12-12 DIAGNOSIS — Z79.4 TYPE 2 DIABETES MELLITUS WITH STAGE 3 CHRONIC KIDNEY DISEASE, WITH LONG-TERM CURRENT USE OF INSULIN: ICD-10-CM

## 2019-12-12 DIAGNOSIS — N18.30 TYPE 2 DIABETES MELLITUS WITH STAGE 3 CHRONIC KIDNEY DISEASE, WITH LONG-TERM CURRENT USE OF INSULIN: ICD-10-CM

## 2019-12-12 DIAGNOSIS — E66.01 MORBID OBESITY WITH BODY MASS INDEX (BMI) OF 40.0 TO 44.9 IN ADULT: ICD-10-CM

## 2019-12-12 DIAGNOSIS — E11.22 TYPE 2 DIABETES MELLITUS WITH STAGE 3 CHRONIC KIDNEY DISEASE, WITH LONG-TERM CURRENT USE OF INSULIN: ICD-10-CM

## 2019-12-12 DIAGNOSIS — R30.0 DYSURIA: ICD-10-CM

## 2019-12-12 DIAGNOSIS — R30.0 DYSURIA: Primary | ICD-10-CM

## 2019-12-12 DIAGNOSIS — G47.33 OBSTRUCTIVE SLEEP APNEA: Primary | ICD-10-CM

## 2019-12-12 PROCEDURE — 1126F PR PAIN SEVERITY QUANTIFIED, NO PAIN PRESENT: ICD-10-PCS | Mod: HCNC,S$GLB,, | Performed by: NURSE PRACTITIONER

## 2019-12-12 PROCEDURE — 99214 PR OFFICE/OUTPT VISIT, EST, LEVL IV, 30-39 MIN: ICD-10-PCS | Mod: HCNC,S$GLB,, | Performed by: NURSE PRACTITIONER

## 2019-12-12 PROCEDURE — 87077 CULTURE AEROBIC IDENTIFY: CPT | Mod: HCNC

## 2019-12-12 PROCEDURE — 3077F PR MOST RECENT SYSTOLIC BLOOD PRESSURE >= 140 MM HG: ICD-10-PCS | Mod: HCNC,CPTII,S$GLB, | Performed by: NURSE PRACTITIONER

## 2019-12-12 PROCEDURE — 3078F DIAST BP <80 MM HG: CPT | Mod: HCNC,CPTII,S$GLB, | Performed by: NURSE PRACTITIONER

## 2019-12-12 PROCEDURE — 1159F PR MEDICATION LIST DOCUMENTED IN MEDICAL RECORD: ICD-10-PCS | Mod: HCNC,S$GLB,, | Performed by: NURSE PRACTITIONER

## 2019-12-12 PROCEDURE — 87088 URINE BACTERIA CULTURE: CPT | Mod: HCNC

## 2019-12-12 PROCEDURE — 87086 URINE CULTURE/COLONY COUNT: CPT | Mod: HCNC

## 2019-12-12 PROCEDURE — 3078F PR MOST RECENT DIASTOLIC BLOOD PRESSURE < 80 MM HG: ICD-10-PCS | Mod: HCNC,CPTII,S$GLB, | Performed by: NURSE PRACTITIONER

## 2019-12-12 PROCEDURE — 99214 OFFICE O/P EST MOD 30 MIN: CPT | Mod: HCNC,S$GLB,, | Performed by: NURSE PRACTITIONER

## 2019-12-12 PROCEDURE — 87186 SC STD MICRODIL/AGAR DIL: CPT | Mod: HCNC

## 2019-12-12 PROCEDURE — 1126F AMNT PAIN NOTED NONE PRSNT: CPT | Mod: HCNC,S$GLB,, | Performed by: NURSE PRACTITIONER

## 2019-12-12 PROCEDURE — 3077F SYST BP >= 140 MM HG: CPT | Mod: HCNC,CPTII,S$GLB, | Performed by: NURSE PRACTITIONER

## 2019-12-12 PROCEDURE — 99999 PR PBB SHADOW E&M-EST. PATIENT-LVL V: CPT | Mod: PBBFAC,HCNC,, | Performed by: NURSE PRACTITIONER

## 2019-12-12 PROCEDURE — 1159F MED LIST DOCD IN RCRD: CPT | Mod: HCNC,S$GLB,, | Performed by: NURSE PRACTITIONER

## 2019-12-12 PROCEDURE — 1101F PR PT FALLS ASSESS DOC 0-1 FALLS W/OUT INJ PAST YR: ICD-10-PCS | Mod: HCNC,CPTII,S$GLB, | Performed by: NURSE PRACTITIONER

## 2019-12-12 PROCEDURE — 1101F PT FALLS ASSESS-DOCD LE1/YR: CPT | Mod: HCNC,CPTII,S$GLB, | Performed by: NURSE PRACTITIONER

## 2019-12-12 PROCEDURE — 99999 PR PBB SHADOW E&M-EST. PATIENT-LVL V: ICD-10-PCS | Mod: PBBFAC,HCNC,, | Performed by: NURSE PRACTITIONER

## 2019-12-12 NOTE — PROGRESS NOTES
"   Vivienne Jose  was seen as f/u for mgt of BAM, accompanied by her . Initial visit with me    She had titration study, good at cpap 14cm but no supine REM sleep so apap recommended. She is eligible for new machine but hadn' t heard from anyone. Continues to use cpap 15cm. "can;t sleep without it" or else can feel throat closing. +++oral drying despite white chin strap going over her mouth. Knows she is mouth breather but FFM intolerable in past (too much air on face). She is not sleepy at work or engaged or while driving, but b/t 2-4pm when sedentary ie S[employer] or weekends may almost doze off but doesn't.   Interrogation- resmed airsense machine. AHI 0.3, avg use 9.2h/night. Durand LT mask. Got machine 4/2014. Tried many other nose pillow masks also.         HISTORY  08/28/2018 INITIAL HISTORY OF PRESENT ILLNESS:  Vivienne Jose is a 69 y.o. female is here to be evaluated for a sleep disorder.       CHIEF COMPLAINT:      The patient's complaints include excessive daytime sleepiness, excessive daytime fatigue, snoring,  witnessed breathing pauses,  gasping for air in sleep and interrupted sleep since  2004, when she was diagnosed with severe BAM.    She has been well controlled for about 10 years.    She was previously seeing Dr. Wilkins.    HEr machine is 10 years old and needs replacement.    She noticed feeling more tired and waking up more frequently over the last year.    She reports occasional break through snoring.    She believes she has gained 50 lbs since titration.    She replaces her Durand mask regularly with a chin strap.    Reports  dry mouth and sore throat  Reports nasal congestion - Claritin and now Flonase and Asteline  Reports  morning headaches  Reports  interrupted sleep  Denies frequent leg movements  Denies symptoms concerning for parasomnia    The ESS ([place] Sleepiness Score) taken on initial visit is 15 /24    The patient had tonsillectomy in the past     06/11/2014: She comes with her " new Resmed machine  today. Set at 12 cm H2O. No issue with mouthleak, dry mouth, break through snoring. Recent titration showed 12-13 cm H2O to be effective. Unfortunately she is still feeling sleepy - ESS 18/24. No PLMS found on sleep study.    CPAP pressure: 12 cm H2O  Mask comfort / fit:  Durand - fits fine    Pressure tolerance: fine; not using ramp   Humidification: fine   CPAP Interrogation:    Ave daily usage:7.8 hours /7days  Ave daily usage:7.8 hours /30days  Days >4 hours usage: 7 /7 days  Days >4 hours usage: 30/30 days   Machine condition: good     Frequent arousals were noted even when her respiratory events have been controlled.Her bedroom is dark and she denies physical discomfort and polyuria. No PLMS reported on the sleep study. Denied leg discomfort.    08/28/2018: AStill sleepy with inactivity. Significant early afternoon drowsiness.   CPAP 12 cm (211 on manometry). Study 2014 recommended 12-13 cm.  At times needs to rearranged her Durand mask at night.  Using chin strap. Did not like Dreamwear.No break through snoring.  Gained 2 lbs.    AHi 0.3  9 hrs per day  3 L/min leak  100% Compliance.    EPWORTH SLEEPINESS SCALE 8/23/2018   Sitting and reading 3   Watching TV 2   Sitting, inactive in a public place (e.g. a theatre or a meeting) 1   As a passenger in a car for an hour without a break 2   Lying down to rest in the afternoon when circumstances permit 3   Sitting and talking to someone 0   Sitting quietly after a lunch without alcohol 0   In a car, while stopped for a few minutes in traffic 0   Total score 11     12/10/2018:    CPAP 13 cm on Airsence straight CPAP. Feeling better at 13.5, but still residual sleepiness.  Concerned about Gabapentin effect. Recent titration showed CPAP 12 cm as effective in terms of AHI.  Dide not tolerate FFM - very uncomfortable; back to Durand for hear  Keeps humidity 2/8 because of condensation (bedroom very cold).  No break through snoring.  The patient reports  improved sleep continuity and daytime sleepiness on PAP. ESS today is 14/24.  Denies break through snoring. ++++ dry mouth.     Recent titration was discussed.    EPWORTH SLEEPINESS SCALE 12/8/2018   Sitting and reading 3   Watching TV 2   Sitting, inactive in a public place (e.g. a theatre or a meeting) 1   As a passenger in a car for an hour without a break 3   Lying down to rest in the afternoon when circumstances permit 3   Sitting and talking to someone 0   Sitting quietly after a lunch without alcohol 2   In a car, while stopped for a few minutes in traffic 0   Total score 14         02/27/2019:  Vivienne Jose still reports interrupted sleep and significant daytime sleepiness. On Gabapentin 600 mg BID.  Failed Dreamwear and Dreamwear full - comfortable but bad seal. Prefers Durand LT Medium cushion.  Decided to stary with Durand LT Medium.   Decided not to try Nuvigil/Provigil.  Doing well with pressure increase 14.5-18 cm H2O.     30/30 hrs  6-9 hrs per night  AHI 0.3    Reports always being sleepy since college years. She has always fallen asleep while reading. It her life it has been hard to stay awake through the mess.  Denied dreams while napping.   Very hard to stay asleep while working.   + occasional sleep paralysis.  Awaiting orthopaedic surgery.         09/25/2019;    Vivienne Jose still reports significant sleepiness despite compliant CPAP use. Dozed off waiting for this appointment.   LAst titration showed extremely well controlled, however SaO2 min was at low 90%s even at the highest tried pressure.  I wonder if BPAP could be a better option as her new machine. Her machine is almost due for replacement.   Decreasing Gabapentin did not help afternoon drowsiness.    ESS ([place] Sleepiness Scale)   Wearing a chin strap (still mouth breaths)  CPAP 9.5 (straing CPAp Airsence)  HRs 9.5  30/30  AHI 0.7    EPWORTH SLEEPINESS SCALE 9/22/2019   Sitting and reading 3   Watching TV 2   Sitting, inactive in a  public place (e.g. a theatre or a meeting) 1   As a passenger in a car for an hour without a break 1   Lying down to rest in the afternoon when circumstances permit 3   Sitting and talking to someone 0   Sitting quietly after a lunch without alcohol 1   In a car, while stopped for a few minutes in traffic 0   Total score 11       PHQ9 12/10/2019   Little interest or pleasure in doing things: Not at all   Feeling down, depressed or hopeless: Not at all   Trouble falling asleep, staying asleep, or sleeping too much: Nearly every day   Feeling tired or having little energy: More than half the days   Poor appetite or overeating: Not at all   Feeling bad about yourself- or that you are a failure or have let yourself or family down Not at all   Trouble concentrating on things, such as reading the newspaper or watching television: Not at all   Moving or speaking so slowly that other people could have noticed. Or the opposite- being so fidgety or restless that you have been moving around a lot more than usual: Not at all   Thoughts that you would be better off dead or hurting yourself in some way: Not at all   If you indicated you have experienced any of the aforementioned problems, how difficult have these problems made it for you to do your work, take care of things at home or get along with other people? Not difficult at all   Total Score 5     GAD7 12/10/2019   Feeling nervous, anxious, on edge Not at all   Not being able to stop or control worrying Not at all   Worrying too much about different things Not at all   Trouble relaxing Not at all   Being so restless that its hard to sit still Not at all   Becoming easily annoyed or irritable More than half the days   Feeling afraid as if something awful might happen Not at all   If you marked you are experiencing any of the aforementioned problems, how difficult have these made it for you to do your work, take care of things at home, or get along with other people? Not  difficult at all   DOLORES-7 Score 2         EPWORTH SLEEPINESS SCALE 9/22/2019   Sitting and reading 3   Watching TV 2   Sitting, inactive in a public place (e.g. a theatre or a meeting) 1   As a passenger in a car for an hour without a break 1   Lying down to rest in the afternoon when circumstances permit 3   Sitting and talking to someone 0   Sitting quietly after a lunch without alcohol 1   In a car, while stopped for a few minutes in traffic 0   Total score 11             SLEEP ROUTINE 12/12/2019 :    Bed partner:   Time to bed: 10 PM  Sleep onset latency: 5 min  Disruptions or awakenings: 4-5  Time to fall back into sleep: 1 min  Wakeup time: 5 AM   Perceived sleep quality: 2/5  Perceived total sleep time:  7  hours.  Daytime naps: 0  Weekend sleep routine: 7 AM  Exercise routine: N/A     PREVIOUS SLEEP STUDIES:     PSG/ SPLIT night study  In 2004 showed significant BAM with the AHI of 33.1/hour and SaO2 minimum of 88 %. Effective control of respiratory events was achieved at   10 cm H2O. Sleep efficiency was 79 %.    CPAP titration on 4/23/14: Adequate control of respiratory events was achieved at 12-13 cm of H2O. Weight 238 lbs. Frequent arousals were noted even when her respiratory events have been controlled.  CPAP titration on 9/19/18: Effective control of respiratory events was achieved at 12 cm of H2O, however SaO2 was high 80s-low 90s. Weight 241 lbs.        DME: [facility]      PAST MEDICAL HISTORY:    Active Ambulatory Problems     Diagnosis Date Noted    Trigger middle finger of right hand 07/30/2012    Kidney stones 08/24/2012    HTN (hypertension) 08/24/2012    Post-surgical hypothyroidism 11/23/2012    CAD (coronary artery disease) 11/23/2012    Chronic allergic rhinitis 11/30/2012    Nuclear sclerosis - Both Eyes 04/02/2013    Carpal tunnel syndrome of right wrist 07/08/2014    Pain in limb 07/24/2014    Stiffness of joint 07/24/2014    Muscle weakness 07/24/2014     Proteinuria 02/03/2015    Dyslipidemia associated with type 2 diabetes mellitus 04/28/2015    BAM (obstructive sleep apnea) 04/28/2015    Sacroiliitis 06/09/2015    Facet arthritis of lumbar region 06/09/2015    Physical deconditioning 06/09/2015    Osteopenia 09/22/2015    Allergic rhinitis 09/22/2015    Hypertension associated with diabetes 09/22/2015    Vitamin D deficiency 09/22/2015    Sleep apnea 09/22/2015    Hypocalcemia 02/29/2016    Morbid obesity with body mass index (BMI) of 40.0 to 44.9 in adult 02/29/2016    Recurrent HSV (herpes simplex virus) 03/21/2016    Chronic pain of right knee 05/02/2016    Gastroesophageal reflux disease 07/22/2016    Hyperlipidemia 07/22/2016    Nephrolithiasis 12/14/2016    Recurrent UTI 03/02/2017    Hypokalemia 04/24/2017    Diabetic polyneuropathy associated with type 2 diabetes mellitus 04/24/2017    Diverticulosis of intestine without bleeding 08/02/2017    Skin nodule 08/02/2017    Facet arthritis of cervical region 07/25/2011    Bilateral carotid artery disease 07/26/2011    Type 2 diabetes mellitus with stage 3 chronic kidney disease, with long-term current use of insulin 02/23/2018    Chronic kidney disease, stage III (moderate) 02/23/2018    Fatigue 06/21/2018    Gait instability 07/16/2018    Interstitial cystitis 09/21/2018    Chronic left-sided low back pain with left-sided sciatica 10/22/2018    Hematuria 11/16/2018    S/P total knee arthroplasty, right 03/07/2019    Knee pain, right 04/05/2019    Decreased range of motion of right knee 04/05/2019    Muscle weakness of lower extremity 04/05/2019    Impaired gait and mobility 04/05/2019    Mild nonproliferative diabetic retinopathy of 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 edema associated with type 2 diabetes mellitus 04/18/2019    Hypothyroidism 08/05/2019     Resolved Ambulatory Problems     Diagnosis Date Noted    Allergic conjunctivitis 11/23/2012    Screen for colon cancer  08/29/2014    UTI symptoms 11/05/2014    URI (upper respiratory infection) 01/07/2015    LBP radiating to left leg 06/12/2015    Preventative health care 09/22/2015    Type 2 diabetes mellitus, uncontrolled 09/22/2015    Body mass index 45.0-49.9, adult 09/22/2015    Nausea & vomiting 11/23/2015    Dysuria 02/29/2016    Soft tissue mass 08/04/2017    Low vitamin D level 03/14/2018    Primary osteoarthritis of right knee 03/06/2019     Past Medical History:   Diagnosis Date    Allergy     Angio-edema     Arthritis     Cataract     Cerebrovascular malformation     Colon polyps     Coronary artery disease     Diabetes mellitus, type 2     Diabetic peripheral neuropathy     GERD (gastroesophageal reflux disease)     Herpes infection     Hypertension     Obesity, morbid     Ovarian cyst     Pyelonephritis, chronic     Seizure disorder, focal motor     Type II or unspecified type diabetes mellitus with neurological manifestations, uncontrolled(250.62)     Urinary tract infection     Urticaria     Vaginal infection                 PAST SURGICAL HISTORY:    Past Surgical History:   Procedure Laterality Date    CARPAL TUNNEL RELEASE Right 2014    COLONOSCOPY      COLONOSCOPY N/A 9/13/2017    Procedure: COLONOSCOPY Golytely;  Surgeon: Gisela Wall MD;  Location: [facility] ENDO;  Service: Endoscopy;  Laterality: N/A;    CYST REMOVAL      skin; multiples    EXTRACORPOREAL SHOCK WAVE LITHOTRIPSY  2002    HYSTERECTOMY  1984    JOINT REPLACEMENT Right 03/06/2019    knee    KIDNEY STONE SURGERY      KNEE ARTHROPLASTY Right 3/6/2019    Procedure: ARTHROPLASTY, KNEE;  Surgeon: Pj Gamboa MD;  Location: [facility] OR;  Service: Orthopedics;  Laterality: Right;  Depuy (Stephen notified 2/21, CC)    THYROIDECTOMY  1977         TONSILLECTOMY, ADENOIDECTOMY      TRIGGER FINGER RELEASE      TUBAL LIGATION           FAMILY HISTORY:                Family History   Problem Relation Age of Onset     Cancer Father     Arthritis Father     Gout Father     Allergies Son     Allergic rhinitis Son     Skin cancer Mother     Macular degeneration Mother     Dementia Mother     Hypertension Mother     No Known Problems Daughter     Diabetes Daughter     No Known Problems Daughter     Glaucoma Maternal Aunt         Great Maternal Aunt    Leukemia Maternal Uncle     Amblyopia Neg Hx     Cataracts Neg Hx     Retinal detachment Neg Hx     Strabismus Neg Hx     Stroke Neg Hx     Thyroid disease Neg Hx     Kidney disease Neg Hx     Angioedema Neg Hx     Asthma Neg Hx     Atopy Neg Hx     Eczema Neg Hx     Immunodeficiency Neg Hx     Rhinitis Neg Hx     Urticaria Neg Hx        SOCIAL HISTORY:          Tobacco:   Social History     Tobacco Use   Smoking Status Never Smoker   Smokeless Tobacco Never Used       alcohol use:    Social History     Substance and Sexual Activity   Alcohol Use No    Alcohol/week: 0.0 standard drinks    Frequency: Never    Drinks per session: Patient refused    Binge frequency: Never                 Occupation:     ALLERGIES:    Review of patient's allergies indicates:   Allergen Reactions    Sulfa (sulfonamide antibiotics) Nausea Only    Ciprofloxacin     Januvia [sitagliptin]      abd pain    Lisinopril     Lotensin [benazepril]     Nexium [esomeprazole magnesium] Other (See Comments)     Gas       CURRENT MEDICATIONS:    Current Outpatient Medications   Medication Sig Dispense Refill    amLODIPine (NORVASC) 5 MG tablet Take 1 tablet (5 mg total) by mouth every evening. 90 tablet 1    aspirin (ECOTRIN) 81 MG EC tablet Take 81 mg by mouth once daily.      azelastine (ASTELIN) 137 mcg (0.1 %) nasal spray 2 sprays (274 mcg total) by Nasal route 2 (two) times daily. 90 mL 4    benzonatate (TESSALON) 100 MG capsule Take 1 capsule (100 mg total) by mouth 3 (three) times daily as needed for Cough. 30 capsule 0    blood sugar diagnostic (TRUE METRIX GLUCOSE TEST  STRIP) Strp TEST TWO TIMES DAILY 200 strip 6    blood-glucose meter Misc [employer] True Metrix Air meter 1 each 0    calcium carbonate-vitamin D3 600 mg(1,500mg) -100 unit Cap Take 1 tablet by mouth once daily.      cefpodoxime (VANTIN) 200 MG tablet Take 1 tablet (200 mg total) by mouth 2 (two) times daily. 14 tablet 0    chlorthalidone (HYGROTEN) 25 MG Tab Take 1 tablet (25 mg total) by mouth once daily. 90 tablet 1    cranberry 500 mg Cap Take by mouth.      fluticasone (FLONASE) 50 mcg/actuation nasal spray 2 sprays (100 mcg total) by Each Nare route once daily. 48 g 4    gabapentin (NEURONTIN) 600 MG tablet Take 1 tablet (600 mg total) by mouth 2 (two) times daily. 180 tablet 1    glipiZIDE (GLUCOTROL) 5 MG tablet Take 1 tablet (5 mg total) by mouth 2 (two) times daily before meals. 60 tablet 11    glucagon (human recombinant) inj 1mg/mL kit Inject 1 mL (1 mg total) into the muscle as needed. 1 kit 6    insulin degludec (TRESIBA FLEXTOUCH U-100) 100 unit/mL (3 mL) InPn Inject 30 Units into the skin every evening. 15 Syringe 4    ketotifen (ZADITOR) 0.025 % (0.035 %) ophthalmic solution Place 1-2 drops into both eyes once daily.      L.acid-B.bifidum-B.animal-FOS (PROBIOTIC COMPLEX) 25 billion cell -100 mg Cap Take 1 capsule by mouth once daily.      lancets (TRUEPLUS LANCETS) 28 gauge [facility] [place] Inject 1 lancet into the skin 2 (two) times daily before meals. 200 each 6    levothyroxine (SYNTHROID) 75 MCG tablet Take 1 tablet (75 mcg total) by mouth once daily. 90 tablet 1    liraglutide 0.6 mg/0.1 mL, 18 mg/3 mL, subq PNIJ (VICTOZA 3-TAWANA) 0.6 mg/0.1 mL (18 mg/3 mL) PnIj INJECT 1.8 MG INTO THE SKIN ONCE DAILY. 27 mL 11    loratadine (CLARITIN) 10 mg tablet Take 10 mg by mouth once daily.      lovastatin (MEVACOR) 40 MG tablet TAKE 1 TABLET NIGHTLY (SUBSTITUTED FOR MEVACOR) 90 tablet 1    magnesium oxide-Mg AA chelate (MAGNESIUM, AMINO ACID CHELATE,) 133 mg Tab Take by mouth as directed.       metFORMIN  "(GLUCOPHAGE) 1000 MG tablet Take 1 tablet (1,000 mg total) by mouth 2 (two) times daily with meals. 180 tablet 3    omeprazole (PRILOSEC) 20 MG capsule Take 1 capsule (20 mg total) by mouth daily as needed. 90 capsule 1    pen needle, diabetic (BD ULTRA-FINE EDUARDO PEN NEEDLE) 32 gauge x 5/32" Ndle USE AS DIRECTED 200 each 6    potassium chloride SA (K-DUR,KLOR-CON) 10 MEQ tablet TAKE 1 TABLET EVERY DAY  EXCEPT TAKE 2 TABLETS ON MONDAY, WEDNESDAY AND FRIDAY 130 tablet 1    PREMARIN vaginal cream Place 0.5 g vaginally 3 (three) times a week. 30 g 3    valACYclovir (VALTREX) 500 MG tablet Take 1 tablet (500 mg total) by mouth once daily. 90 tablet 1    valsartan (DIOVAN) 160 MG tablet Take 1 tablet (160 mg total) by mouth 2 (two) times daily. 180 tablet 1    azithromycin (Z-TAWANA) 250 MG tablet Take 2 tablets by mouth x 1 for day 1 Then take 1 tablet by mouth daily for day 2 - 5 6 tablet 0     No current facility-administered medications for this visit.                       REVIEW OF SYSTEMS: Sleep related symptoms as per HPI, 2# l oss. Otherwise a balance review of 10-systems is negative.     PHYSICAL EXAM:  BP (!) 144/69   Pulse 77   Ht 5' 3" (1.6 m)   Wt 107.6 kg (237 lb 3.2 oz)   LMP  (LMP Unknown)   BMI 42.02 kg/m²   GENERAL: well groomed,  well groomed, morbid obese    ASSESSMENT:    1.Previously diagnosed severe  BAM (obstructive sleep apnea). The patient symptomatically has  excessive daytime sleepiness, snoring,  witnessed breathing pauses, excessive daytime fatigue, gasping for air in sleep and interrupted sleep  with exam findings of "a crowded oral airway and elevated body mass index. The patient has medical co-morbidities of CAD, diabetes, heart disease and hypertension,  Morbid obesity, which can be worsened by BAM. This warrants treatment. Doing well with CPAP 14.5 with NL AHi pm CPAP download, however significant residual sleepiness. SaO2 was in the 90's on current settings as per titration " 2018. 12/12/19: BAM severe. Excellent continued adherence with cpap AHI<5. With residual hypersomnia afternoon        PLAN:    NEW apap capability mode 13-19cm at [facility]  rTC b/t 31-90d after setup  DEfers trial wake promoting agent, discused purpose and prn basis and s/e      Encouraged weight loss efforts for potential improvement of BAM and overall health benefits and see PCP re: DM/HTN mgt/continue meds   Discussed effectiveness of therapy and potential ramifications of untreated BAM, including heart disease, hypertension, cognitive difficulties, stroke, and diabetes.

## 2019-12-13 ENCOUNTER — OFFICE VISIT (OUTPATIENT)
Dept: ALLERGY | Facility: CLINIC | Age: 69
End: 2019-12-13
Payer: MEDICARE

## 2019-12-13 ENCOUNTER — CLINICAL SUPPORT (OUTPATIENT)
Dept: INTERNAL MEDICINE | Facility: CLINIC | Age: 69
End: 2019-12-13
Payer: MEDICARE

## 2019-12-13 VITALS — HEIGHT: 63 IN | BODY MASS INDEX: 42.27 KG/M2 | HEART RATE: 66 BPM | OXYGEN SATURATION: 99 % | WEIGHT: 238.56 LBS

## 2019-12-13 DIAGNOSIS — J30.1 SEASONAL ALLERGIC RHINITIS DUE TO POLLEN: ICD-10-CM

## 2019-12-13 DIAGNOSIS — J30.89 ALLERGIC RHINITIS DUE TO DUST MITE: ICD-10-CM

## 2019-12-13 DIAGNOSIS — J01.01 ACUTE RECURRENT MAXILLARY SINUSITIS: ICD-10-CM

## 2019-12-13 DIAGNOSIS — Z51.6 ALLERGY DESENSITIZATION THERAPY: ICD-10-CM

## 2019-12-13 DIAGNOSIS — H10.13 ALLERGIC CONJUNCTIVITIS, BILATERAL: ICD-10-CM

## 2019-12-13 DIAGNOSIS — J30.9 CHRONIC ALLERGIC RHINITIS: Primary | ICD-10-CM

## 2019-12-13 PROCEDURE — 1101F PR PT FALLS ASSESS DOC 0-1 FALLS W/OUT INJ PAST YR: ICD-10-PCS | Mod: HCNC,CPTII,S$GLB, | Performed by: ALLERGY & IMMUNOLOGY

## 2019-12-13 PROCEDURE — 95115 IMMUNOTHERAPY ONE INJECTION: CPT | Mod: HCNC,S$GLB,, | Performed by: FAMILY MEDICINE

## 2019-12-13 PROCEDURE — 99999 PR PBB SHADOW E&M-EST. PATIENT-LVL I: ICD-10-PCS | Mod: PBBFAC,HCNC,,

## 2019-12-13 PROCEDURE — 99214 OFFICE O/P EST MOD 30 MIN: CPT | Mod: HCNC,S$GLB,, | Performed by: ALLERGY & IMMUNOLOGY

## 2019-12-13 PROCEDURE — 99499 UNLISTED E&M SERVICE: CPT | Mod: HCNC,S$GLB,, | Performed by: ALLERGY & IMMUNOLOGY

## 2019-12-13 PROCEDURE — 1159F PR MEDICATION LIST DOCUMENTED IN MEDICAL RECORD: ICD-10-PCS | Mod: HCNC,S$GLB,, | Performed by: ALLERGY & IMMUNOLOGY

## 2019-12-13 PROCEDURE — 99499 NO LOS: ICD-10-PCS | Mod: HCNC,S$GLB,, | Performed by: ALLERGY & IMMUNOLOGY

## 2019-12-13 PROCEDURE — 1159F MED LIST DOCD IN RCRD: CPT | Mod: HCNC,S$GLB,, | Performed by: ALLERGY & IMMUNOLOGY

## 2019-12-13 PROCEDURE — 99999 PR PBB SHADOW E&M-EST. PATIENT-LVL III: ICD-10-PCS | Mod: PBBFAC,HCNC,, | Performed by: ALLERGY & IMMUNOLOGY

## 2019-12-13 PROCEDURE — 99999 PR PBB SHADOW E&M-EST. PATIENT-LVL III: CPT | Mod: PBBFAC,HCNC,, | Performed by: ALLERGY & IMMUNOLOGY

## 2019-12-13 PROCEDURE — 99214 PR OFFICE/OUTPT VISIT, EST, LEVL IV, 30-39 MIN: ICD-10-PCS | Mod: HCNC,S$GLB,, | Performed by: ALLERGY & IMMUNOLOGY

## 2019-12-13 PROCEDURE — 99999 PR PBB SHADOW E&M-EST. PATIENT-LVL I: CPT | Mod: PBBFAC,HCNC,,

## 2019-12-13 PROCEDURE — 95115 PR IMMUNOTHERAPY, ONE INJECTION: ICD-10-PCS | Mod: HCNC,S$GLB,, | Performed by: FAMILY MEDICINE

## 2019-12-13 PROCEDURE — 1101F PT FALLS ASSESS-DOCD LE1/YR: CPT | Mod: HCNC,CPTII,S$GLB, | Performed by: ALLERGY & IMMUNOLOGY

## 2019-12-13 RX ORDER — BENZONATATE 100 MG/1
100 CAPSULE ORAL 3 TIMES DAILY PRN
Qty: 90 CAPSULE | Refills: 0 | Status: SHIPPED | OUTPATIENT
Start: 2019-12-13 | End: 2019-12-23

## 2019-12-13 NOTE — PROGRESS NOTES
Subjective:       Patient ID: Vivienne Jose is a 69 y.o. female.    Chief Complaint:  Annual Exam (new vial in 4 doses)      HPI Comments: 69 year-old woman presents for continued evaluation of chronic allergic rhinitis and conjunctivitis on IT.  She was last seen 12/13/18. In 2012 she had skin test with 4+ cat and dust mites and 1+ willow tree.  She was started on Claritin daily and fluticasone and Zaditor drops in morning.  She did ok on this but had flares so added azelastine 2 SEN BID as needed.  She would still  get sneeze, runny nose, sore throat, dry itchy eyes, stuffy nose and PND.  She started allergy shots 7/2015. She reached maintenance about 12/2015. She was off for several months 2017 due to ordering issues. She is on maintenance, vial 1:1 0.5cc. Gets 1 shots C, Dm, TR in left arm. She has done much better with shots, feels shots really help. She has found over last 6 months to have increased sinus pressure and PND. Then in lat month had sinus infection and thick green mucus, lots of pressure but copious discharge and cough. Was seen 12/4 and given steroid shot and z pack and has helped some. She is on Flonase 2 SEN in AM, Zaditor 1 drop each eye in AM, azelastine 1 SEN at night and Claritin 10 mg at night. She did increase shots from every 4 weeks to every 2-3 weeks and this helps.   No CAD, she had angiogram and told mild blockage but no stents.  She is on ACE-I and ca channel blocker for HTN.      Prior history: new patient evaluation of allergies.  She used to see Dr Weber and was on allergy shots from [id number].  She states they worked great and all symptoms resolved.  However over last year she has felt symptoms returning.  Her eyes itch intensely and are dry at times and water at times.  She has runny nose only when eating otherwise has some sinus pressure and PND>  No stuffy nose.  She sneezes a few times each morning.  She has always been worse around cats but does not have one.  Occ has chest  congestion but no asthma.  NO eczema.  No food allergy.  She was worse few months ago otherwise not sure of season.  No difference nay time of day, no diff inside or out.  Claritin prn helps but makes her sleepy.  Using Zaditor with minimal relief.        Environmental History: see history    Review of Systems   Constitutional: Negative for fever, chills, appetite change and fatigue.   HENT: Positive for rhinorrhea, sneezing and postnasal drip. Negative for ear pain, nosebleeds, congestion, sore throat, facial swelling, trouble swallowing, voice change, sinus pressure and ear discharge.    Eyes: Positive for discharge and itching. Negative for redness and visual disturbance.   Respiratory: Negative for cough, choking, chest tightness, shortness of breath and wheezing.    Cardiovascular: Negative for chest pain, palpitations and leg swelling.   Gastrointestinal: Negative for nausea, vomiting, abdominal pain, diarrhea, constipation and abdominal distention.   Genitourinary: Negative for difficulty urinating.   Musculoskeletal: Negative for myalgias, joint swelling, arthralgias and gait problem.   Skin: Negative for color change and rash.   Neurological: Negative for dizziness, syncope, weakness, light-headedness and headaches.   Hematological: Negative for adenopathy. Does not bruise/bleed easily.   Psychiatric/Behavioral: Negative for behavioral problems, confusion, disturbed wake/sleep cycle and agitation. The patient is not nervous/anxious.         Objective:    Physical Exam   Nursing note and vitals reviewed.  Constitutional: She is oriented to person, place, and time. She appears well-developed and well-nourished. No distress.   HENT:   Head: Normocephalic and atraumatic.   Right Ear: Hearing, tympanic membrane, external ear and ear canal normal.   Left Ear: Hearing, tympanic membrane, external ear and ear canal normal.   Nose: No mucosal edema, rhinorrhea, sinus tenderness or septal deviation. No epistaxis.  Right sinus exhibits no maxillary sinus tenderness and no frontal sinus tenderness. Left sinus exhibits no maxillary sinus tenderness and no frontal sinus tenderness.   Mouth/Throat: Uvula is midline, oropharynx is clear and moist and mucous membranes are normal. No uvula swelling.   Eyes: Conjunctivae are normal. Right eye exhibits no discharge. Left eye exhibits no discharge.   Neck: Normal range of motion. No thyromegaly present.   Cardiovascular: Normal rate, regular rhythm and normal heart sounds.    No murmur heard.  Pulmonary/Chest: Effort normal and breath sounds normal. No respiratory distress. She has no wheezes.   Abdominal: Soft. She exhibits no distension. There is no tenderness.   Musculoskeletal: Normal range of motion. She exhibits no edema and no tenderness.   Lymphadenopathy:     She has no cervical adenopathy.   Neurological: She is alert and oriented to person, place, and time.   Skin: Skin is warm and dry. No rash noted. No erythema.   Psychiatric: She has a normal mood and affect. Her behavior is normal. Judgment and thought content normal.       Laboratory:   Percutaneous Skin Testing: Inhalants- 4+ dust mites, 3+ cat, 1+ willow with 3+ histamine control and remainder all negative  Assessment:       1. Chronic allergic rhinitis    2. Allergic rhinitis due to dust mite    3. Allergic conjunctivitis, bilateral    4. Allergy desensitization therapy         Plan:       1. Dust mite avoidance, measures discussed and handout provided.   2. Continue Flonase 2 SEN daily  3. Continue Claritin 10 mg daily  4. Zaditor prn  5. Continue azelastine 1 SEN nightly and increase to BID as needed  6. continue immunotherapy, vial 1:1 0.5cc  every 3 weeks. Patient voiced understanding and agreed.    Patient advised to remain off beta blockers while on allergy shots and to notify injection clinic and MD of any new medications starts.

## 2019-12-15 ENCOUNTER — OFFICE VISIT (OUTPATIENT)
Dept: URGENT CARE | Facility: CLINIC | Age: 69
End: 2019-12-15
Payer: MEDICARE

## 2019-12-15 VITALS
SYSTOLIC BLOOD PRESSURE: 149 MMHG | DIASTOLIC BLOOD PRESSURE: 62 MMHG | HEIGHT: 63 IN | BODY MASS INDEX: 42.17 KG/M2 | RESPIRATION RATE: 18 BRPM | TEMPERATURE: 98 F | WEIGHT: 238 LBS | HEART RATE: 73 BPM | OXYGEN SATURATION: 98 %

## 2019-12-15 DIAGNOSIS — B96.89 BACTERIAL SINUSITIS: ICD-10-CM

## 2019-12-15 DIAGNOSIS — J32.9 BACTERIAL SINUSITIS: ICD-10-CM

## 2019-12-15 DIAGNOSIS — N30.00 ACUTE CYSTITIS WITHOUT HEMATURIA: Primary | ICD-10-CM

## 2019-12-15 DIAGNOSIS — R35.0 FREQUENCY OF URINATION: ICD-10-CM

## 2019-12-15 LAB
BACTERIA UR CULT: ABNORMAL
BILIRUB UR QL STRIP: NEGATIVE
GLUCOSE UR QL STRIP: NEGATIVE
KETONES UR QL STRIP: NEGATIVE
LEUKOCYTE ESTERASE UR QL STRIP: POSITIVE
PH, POC UA: 8 (ref 5–8)
POC BLOOD, URINE: NEGATIVE
POC NITRATES, URINE: NEGATIVE
PROT UR QL STRIP: NEGATIVE
SP GR UR STRIP: 1.01 (ref 1–1.03)
UROBILINOGEN UR STRIP-ACNC: ABNORMAL (ref 0.1–1.1)

## 2019-12-15 PROCEDURE — 87077 CULTURE AEROBIC IDENTIFY: CPT | Mod: HCNC

## 2019-12-15 PROCEDURE — 87086 URINE CULTURE/COLONY COUNT: CPT | Mod: HCNC

## 2019-12-15 PROCEDURE — 99214 OFFICE O/P EST MOD 30 MIN: CPT | Mod: S$GLB,,, | Performed by: NURSE PRACTITIONER

## 2019-12-15 PROCEDURE — 87088 URINE BACTERIA CULTURE: CPT | Mod: HCNC

## 2019-12-15 PROCEDURE — 81003 POCT URINALYSIS, DIPSTICK, AUTOMATED, W/O SCOPE: ICD-10-PCS | Mod: QW,S$GLB,, | Performed by: NURSE PRACTITIONER

## 2019-12-15 PROCEDURE — 81003 URINALYSIS AUTO W/O SCOPE: CPT | Mod: QW,S$GLB,, | Performed by: NURSE PRACTITIONER

## 2019-12-15 PROCEDURE — 99214 PR OFFICE/OUTPT VISIT, EST, LEVL IV, 30-39 MIN: ICD-10-PCS | Mod: S$GLB,,, | Performed by: NURSE PRACTITIONER

## 2019-12-15 PROCEDURE — 87186 SC STD MICRODIL/AGAR DIL: CPT | Mod: HCNC

## 2019-12-15 RX ORDER — AMOXICILLIN AND CLAVULANATE POTASSIUM 875; 125 MG/1; MG/1
1 TABLET, FILM COATED ORAL 2 TIMES DAILY
Qty: 14 TABLET | Refills: 0 | Status: SHIPPED | OUTPATIENT
Start: 2019-12-15 | End: 2020-02-07

## 2019-12-15 NOTE — PATIENT INSTRUCTIONS
PLEASE READ YOUR DISCHARGE INSTRUCTIONS ENTIRELY AS IT CONTAINS IMPORTANT INFORMATION.      Take the antibiotics to completion.     Drink plenty of fluids, wipe front to back, take showers not baths, no scented soaps, wear breathable cotton underwear, urinate after sexual intercourse.     A urine culture was sent. You will be contacted once it results and appropriate action will be taken if needed.     Please go to the ER for worsening symptoms including fever, worsening flank pain, vomiting, etc.     Try over the counter afrin, but for NO LONGER than 3 days as it can cause rebound congestion. Then you can switch to flonase if you find the nasal sprays are working well.     If you find this dries your nose out or your nose bleeds, try using over the counter nasal saline a few minutes prior to using the flonase to moisten the lining of your nose and throughout the day as needed.     Please take an over the counter antihistamine medication (allegra/Claritin/Zyrtec) of your choice as directed and mucinex-D (if it gives you funny heartbeats you can switch to regular mucinex).     You can try breathe right strips at night to help you breathe.  A cool mist humidifier in bedroom may help with cough and relieve stuffy nose.     Sore throat recommendations: Warm fluids, warm salt water gargles, throat lozenges, tea, honey, soup, rest, hydration.     Sinus rinses DO NOT USE TAP WATER, if you must, water must be a rolling boil for 1 minute, let it cool, then use.  May use distilled water, or over the counter nasal saline rinses.  Vics vapor rub in shower to help open nasal passages.  May use nasal gel to keep passages moisturized.  May use Nasal saline sprays during the day for added relief of congestion.   For those who go to the gym, please do not use the sauna or steam room now to clear sinuses.    During pollen season, change shirt if you are outside for a while when you go in.  Also wash your face.  Do not touch your  "face with your hands.  Wash your hands often in general while ill, avoid face contact with hands. Good nutrition. Lots of rest. Plenty of fluids    Over the counter you can use Tylenol (acetominophen) or Ibuprofen for your minor aches and pains as long as you have no contraindications.        Please return or see your primary care doctor if you develop new or worsening symptoms.     Please arrange follow up with your primary medical clinic as soon as possible. You must understand that you've received an Urgent Care treatment only and that you may be released before all of your medical problems are known or treated. You, the patient, will arrange for follow up as instructed. If your symptoms worsen or fail to improve you should go to the Emergency Room.              Bladder Infection, Female (Adult)    Urine is normally doesn't have any bacteria in it. But bacteria can get into the urinary tract from the skin around the rectum. Or they can travel in the blood from elsewhere in the body. Once they are in your urinary tract, they can cause infection in the urethra (urethritis), the bladder (cystitis), or the kidneys (pyelonephritis).  The most common place for an infection is in the bladder. This is called a bladder infection. This is one of the most common infections in women. Most bladder infections are easily treated. They are not serious unless the infection spreads to the kidney.  The phrases "bladder infection," "UTI," and "cystitis" are often used to describe the same thing. But they are not always the same. Cystitis is an inflammation of the bladder. The most common cause of cystitis is an infection.  Symptoms  The infection causes inflammation in the urethra and bladder. This causes many of the symptoms. The most common symptoms of a bladder infection are:  · Pain or burning when urinating  · Having to urinate more often than usual  · Urgent need to urinate  · Only a small amount of urine comes out  · Blood in " urine  · Abdominal discomfort. This is usually in the lower abdomen above the pubic bone.  · Cloudy urine  · Strong- or bad-smelling urine  · Unable to urinate (urinary retention)  · Unable to hold urine in (urinary incontinence)  · Fever  · Loss of appetite  · Confusion (in older adults)  Causes  Bladder infections are not contagious. You can't get one from someone else, from a toilet seat, or from sharing a bath.  The most common cause of bladder infections is bacteria from the bowels. The bacteria get onto the skin around the opening of the urethra. From there, they can get into the urine and travel up to the bladder, causing inflammation and infection. This usually happens because of:  · Wiping improperly after urinating. Always wipe from front to back.  · Bowel incontinence  · Pregnancy  · Procedures such as having a catheter inserted  · Older age  · Not emptying your bladder. This can allow bacteria a chance to grow in your urine.  · Dehydration  · Constipation  · Sex  · Use of a diaphragm for birth control   Treatment  Bladder infections are diagnosed by a urine test. They are treated with antibiotics and usually clear up quickly without complications. Treatment helps prevent a more serious kidney infection.  Medicines  Medicines can help in the treatment of a bladder infection:  · Take antibiotics until they are used up, even if you feel better. It is important to finish them to make sure the infection has cleared.  · You can use acetaminophen or ibuprofen for pain, fever, or discomfort, unless another medicine was prescribed. If you have chronic liver or kidney disease, talk with your healthcare provider before using these medicines. Also talk with your provider if you've ever had a stomach ulcer or gastrointestinal bleeding, or are taking blood-thinner medicines.  · If you are given phenazopydridine to reduce burning with urination, it will cause your urine to become a bright orange color. This can stain  clothing.  Care and prevention  These self-care steps can help prevent future infections:  · Drink plenty of fluids to prevent dehydration and flush out your bladder. Do this unless you must restrict fluids for other health reasons, or your doctor told you not to.  · Proper cleaning after going to the bathroom is important. Wipe from front to back after using the toilet to prevent the spread of bacteria.  · Urinate more often. Don't try to hold urine in for a long time.  · Wear loose-fitting clothes and cotton underwear. Avoid tight-fitting pants.  · Improve your diet and prevent constipation. Eat more fresh fruit and vegetables, and fiber, and less junk and fatty foods.  · Avoid sex until your symptoms are gone.  · Avoid caffeine, alcohol, and spicy foods. These can irritate your bladder.  · Urinate right after intercourse to flush out your bladder.  · If you use birth control pills and have frequent bladder infections, discuss it with your doctor.  Follow-up care  Call your healthcare provider if all symptoms are not gone after 3 days of treatment. This is especially important if you have repeat infections.  If a culture was done, you will be told if your treatment needs to be changed. If directed, you can call to find out the results.  If X-rays were done, you will be told if the results will affect your treatment.  Call 911  Call 911 if any of the following occur:  · Trouble breathing  · Hard to wake up or confusion  · Fainting or loss of consciousness  · Rapid heart rate  When to seek medical advice  Call your healthcare provider right away if any of these occur:  · Fever of 100.4ºF (38.0ºC) or higher, or as directed by your healthcare provider  · Symptoms are not better by the third day of treatment  · Back or belly (abdominal) pain that gets worse  · Repeated vomiting, or unable to keep medicine down  · Weakness or dizziness  · Vaginal discharge  · Pain, redness, or swelling in the outer vaginal area  (labia)  Date Last Reviewed: 10/1/2016  © 8140-6793 [employer]. [address] [place] [address], [place] [address]. All rights reserved. This information is not intended as a substitute for professional medical care. Always follow your healthcare professional's instructions.      Sinusitis (Antibiotic Treatment)    The sinuses are air-filled spaces within the bones of the face. They connect to the inside of the nose. Sinusitis is an inflammation of the tissue lining the sinus cavity. Sinus inflammation can occur during a cold. It can also be due to allergies to pollens and other particles in the air. Sinusitis can cause symptoms of sinus congestion and fullness. A sinus infection causes fever, headache and facial pain. There is often green or yellow drainage from the nose or into the back of the throat (post-nasal drip). You have been given antibiotics to treat this condition.  Home care:  · Take the full course of antibiotics as instructed. Do not stop taking them, even if you feel better.  · Drink plenty of water, hot tea, and other liquids. This may help thin mucus. It also may promote sinus drainage.  · Heat may help soothe painful areas of the face. Use a towel soaked in hot water. Or,  the shower and direct the hot spray onto your face. Using a vaporizer along with a menthol rub at night may also help.   · An expectorant containing guaifenesin may help thin the mucus and promote drainage from the sinuses.  · Over-the-counter decongestants may be used unless a similar medicine was prescribed. Nasal sprays work the fastest. Use one that contains phenylephrine or oxymetazoline. First blow the nose gently. Then use the spray. Do not use these medicines more often than directed on the label or symptoms may get worse. You may also use tablets containing pseudoephedrine. Avoid products that combine ingredients, because side effects may be increased. Read labels. You can also ask the pharmacist for help.  (NOTE: Persons with high blood pressure should not use decongestants. They can raise blood pressure.)  · Over-the-counter antihistamines may help if allergies contributed to your sinusitis.    · Do not use nasal rinses or irrigation during an acute sinus infection, unless told to by your health care provider. Rinsing may spread the infection to other sinuses.  · Use acetaminophen or ibuprofen to control pain, unless another pain medicine was prescribed. (If you have chronic liver or kidney disease or ever had a stomach ulcer, talk with your doctor before using these medicines. Aspirin should never be used in anyone under 18 years of age who is ill with a fever. It may cause severe liver damage.)  · Don't smoke. This can worsen symptoms.  Follow-up care  Follow up with your healthcare provider or our staff if you are not improving within the next week.  When to seek medical advice  Call your healthcare provider if any of these occur:  · Facial pain or headache becoming more severe  · Stiff neck  · Unusual drowsiness or confusion  · Swelling of the forehead or eyelids  · Vision problems, including blurred or double vision  · Fever of 100.4ºF (38ºC) or higher, or as directed by your healthcare provider  · Seizure  · Breathing problems  · Symptoms not resolving within 10 days  Date Last Reviewed: 4/13/2015  © 6084-1518 [employer]. [address] [place] [address], [place] [address]. All rights reserved. This information is not intended as a substitute for professional medical care. Always follow your healthcare professional's instructions.

## 2019-12-15 NOTE — PROGRESS NOTES
"Subjective:       Patient ID: Vivienne Jose is a 69 y.o. female.    Vitals:  height is 5' 3" (1.6 m) and weight is 108 kg (238 lb). Her tympanic temperature is 97.6 °F (36.4 °C). Her blood pressure is 149/62 (abnormal) and her pulse is 73. Her respiration is 18 and oxygen saturation is 98%.     Chief Complaint: Sinus Problem (11 days)    Pt here today for c/o UTI and sinus infection. Pt states she saw her urine culture results this morning, and has not yet been treated. She is requesting treatment today. She also reports worsening sinus congestion/pressure, teeth pain, and purulent nasal discharge worsening over the past 11 days. Pt was prescribed Zpak, and tried oral decongestant, however symptoms have not improved.    Sinus Problem   This is a new problem. The current episode started yesterday. The problem is unchanged. There has been no fever. Associated symptoms include congestion, coughing, ear pain, headaches, sinus pressure and sneezing. Pertinent negatives include no chills, diaphoresis, shortness of breath or sore throat. Past treatments include oral decongestants and antibiotics. The treatment provided moderate relief.       Constitution: Negative for chills, sweating, fatigue and fever.   HENT: Positive for ear pain, congestion and sinus pressure. Negative for sinus pain, sore throat and voice change.    Neck: Negative for painful lymph nodes.   Eyes: Negative for eye redness.   Respiratory: Positive for cough and sputum production. Negative for chest tightness, bloody sputum, COPD, shortness of breath, stridor, wheezing and asthma.    Gastrointestinal: Negative for nausea and vomiting.   Genitourinary: Positive for frequency, urgency and flank pain.   Musculoskeletal: Negative for muscle ache.   Skin: Negative for rash.   Allergic/Immunologic: Positive for sneezing. Negative for seasonal allergies and asthma.   Neurological: Positive for headaches.   Hematologic/Lymphatic: Negative for swollen lymph " nodes.       Objective:      Physical Exam   Constitutional: She is oriented to person, place, and time. She appears well-developed and well-nourished. She is cooperative.  Non-toxic appearance. She does not have a sickly appearance. She does not appear ill. No distress.   HENT:   Head: Normocephalic and atraumatic.   Right Ear: Hearing, tympanic membrane, external ear and ear canal normal.   Left Ear: Hearing, tympanic membrane, external ear and ear canal normal.   Nose: Mucosal edema and rhinorrhea present. No nasal deformity. No epistaxis. Right sinus exhibits maxillary sinus tenderness and frontal sinus tenderness. Left sinus exhibits maxillary sinus tenderness and frontal sinus tenderness.   Mouth/Throat: Uvula is midline and mucous membranes are normal. No trismus in the jaw. Normal dentition. No uvula swelling. Posterior oropharyngeal erythema and cobblestoning present. No oropharyngeal exudate or posterior oropharyngeal edema. Tonsils are 0 on the right. Tonsils are 0 on the left. No tonsillar exudate.   Tonsils surgically absent.   Eyes: Conjunctivae and lids are normal. No scleral icterus.   Neck: Trachea normal, normal range of motion, full passive range of motion without pain and phonation normal. Neck supple. No neck rigidity. No edema and no erythema present.   Cardiovascular: Normal rate, regular rhythm, normal heart sounds, intact distal pulses and normal pulses.   Pulmonary/Chest: Effort normal and breath sounds normal. No stridor. She has no decreased breath sounds. She has no wheezes. She has no rhonchi. She has no rales. She exhibits no tenderness.   Abdominal: Soft. Normal appearance and bowel sounds are normal. She exhibits no distension. There is no tenderness. There is no rigidity, no rebound, no guarding and no CVA tenderness.   Musculoskeletal: Normal range of motion. She exhibits no edema or deformity.        Back:    Lymphadenopathy:        Head (right side): Tonsillar adenopathy present.  No submental, no submandibular, no preauricular and no posterior auricular adenopathy present.        Head (left side): Tonsillar adenopathy present. No submental, no submandibular, no preauricular and no posterior auricular adenopathy present.     She has no cervical adenopathy.   Neurological: She is alert and oriented to person, place, and time. She exhibits normal muscle tone. Coordination normal.   Skin: Skin is warm, dry, intact, not diaphoretic and not pale.   Psychiatric: She has a normal mood and affect. Her speech is normal and behavior is normal. Judgment and thought content normal. Cognition and memory are normal.   Nursing note and vitals reviewed.        Results for orders placed or performed in visit on 12/15/19   POCT Urinalysis, Dipstick, Automated, W/O Scope   Result Value Ref Range    POC Blood, Urine Negative Negative    POC Bilirubin, Urine Negative Negative    POC Urobilinogen, Urine norm 0.1 - 1.1    POC Ketones, Urine Negative Negative    POC Protein, Urine Negative Negative    POC Nitrates, Urine Negative Negative    POC Glucose, Urine Negative Negative    pH, UA 8.0 5 - 8    POC Specific Gravity, Urine 1.010 1.003 - 1.029    POC Leukocytes, Urine Positive (A) Negative     *Note: Due to a large number of results and/or encounters for the requested time period, some results have not been displayed. A complete set of results can be found in Results Review.       Assessment:       1. Acute cystitis without hematuria    2. Bacterial sinusitis    3. Frequency of urination        Plan:         Acute cystitis without hematuria  -     amoxicillin-clavulanate 875-125mg (AUGMENTIN) 875-125 mg per tablet; Take 1 tablet by mouth 2 (two) times daily.  Dispense: 14 tablet; Refill: 0  -     Urine culture    Bacterial sinusitis  -     amoxicillin-clavulanate 875-125mg (AUGMENTIN) 875-125 mg per tablet; Take 1 tablet by mouth 2 (two) times daily.  Dispense: 14 tablet; Refill: 0    Frequency of urination  -      POCT Urinalysis, Dipstick, Automated, W/O Scope    Will treat with Augmentin based on urine culture and sensitivity report and coverage for sinusitis. Pt verbalizes understanding.     Patient Instructions       PLEASE READ YOUR DISCHARGE INSTRUCTIONS ENTIRELY AS IT CONTAINS IMPORTANT INFORMATION.      Take the antibiotics to completion.     Drink plenty of fluids, wipe front to back, take showers not baths, no scented soaps, wear breathable cotton underwear, urinate after sexual intercourse.     A urine culture was sent. You will be contacted once it results and appropriate action will be taken if needed.     Please go to the ER for worsening symptoms including fever, worsening flank pain, vomiting, etc.     Try over the counter afrin, but for NO LONGER than 3 days as it can cause rebound congestion. Then you can switch to flonase if you find the nasal sprays are working well.     If you find this dries your nose out or your nose bleeds, try using over the counter nasal saline a few minutes prior to using the flonase to moisten the lining of your nose and throughout the day as needed.     Please take an over the counter antihistamine medication (allegra/Claritin/Zyrtec) of your choice as directed and mucinex-D (if it gives you funny heartbeats you can switch to regular mucinex).     You can try breathe right strips at night to help you breathe.  A cool mist humidifier in bedroom may help with cough and relieve stuffy nose.     Sore throat recommendations: Warm fluids, warm salt water gargles, throat lozenges, tea, honey, soup, rest, hydration.     Sinus rinses DO NOT USE TAP WATER, if you must, water must be a rolling boil for 1 minute, let it cool, then use.  May use distilled water, or over the counter nasal saline rinses.  Vics vapor rub in shower to help open nasal passages.  May use nasal gel to keep passages moisturized.  May use Nasal saline sprays during the day for added relief of congestion.   For those  "who go to the gym, please do not use the sauna or steam room now to clear sinuses.    During pollen season, change shirt if you are outside for a while when you go in.  Also wash your face.  Do not touch your face with your hands.  Wash your hands often in general while ill, avoid face contact with hands. Good nutrition. Lots of rest. Plenty of fluids    Over the counter you can use Tylenol (acetominophen) or Ibuprofen for your minor aches and pains as long as you have no contraindications.        Please return or see your primary care doctor if you develop new or worsening symptoms.     Please arrange follow up with your primary medical clinic as soon as possible. You must understand that you've received an Urgent Care treatment only and that you may be released before all of your medical problems are known or treated. You, the patient, will arrange for follow up as instructed. If your symptoms worsen or fail to improve you should go to the Emergency Room.              Bladder Infection, Female (Adult)    Urine is normally doesn't have any bacteria in it. But bacteria can get into the urinary tract from the skin around the rectum. Or they can travel in the blood from elsewhere in the body. Once they are in your urinary tract, they can cause infection in the urethra (urethritis), the bladder (cystitis), or the kidneys (pyelonephritis).  The most common place for an infection is in the bladder. This is called a bladder infection. This is one of the most common infections in women. Most bladder infections are easily treated. They are not serious unless the infection spreads to the kidney.  The phrases "bladder infection," "UTI," and "cystitis" are often used to describe the same thing. But they are not always the same. Cystitis is an inflammation of the bladder. The most common cause of cystitis is an infection.  Symptoms  The infection causes inflammation in the urethra and bladder. This causes many of the symptoms. " The most common symptoms of a bladder infection are:  · Pain or burning when urinating  · Having to urinate more often than usual  · Urgent need to urinate  · Only a small amount of urine comes out  · Blood in urine  · Abdominal discomfort. This is usually in the lower abdomen above the pubic bone.  · Cloudy urine  · Strong- or bad-smelling urine  · Unable to urinate (urinary retention)  · Unable to hold urine in (urinary incontinence)  · Fever  · Loss of appetite  · Confusion (in older adults)  Causes  Bladder infections are not contagious. You can't get one from someone else, from a toilet seat, or from sharing a bath.  The most common cause of bladder infections is bacteria from the bowels. The bacteria get onto the skin around the opening of the urethra. From there, they can get into the urine and travel up to the bladder, causing inflammation and infection. This usually happens because of:  · Wiping improperly after urinating. Always wipe from front to back.  · Bowel incontinence  · Pregnancy  · Procedures such as having a catheter inserted  · Older age  · Not emptying your bladder. This can allow bacteria a chance to grow in your urine.  · Dehydration  · Constipation  · Sex  · Use of a diaphragm for birth control   Treatment  Bladder infections are diagnosed by a urine test. They are treated with antibiotics and usually clear up quickly without complications. Treatment helps prevent a more serious kidney infection.  Medicines  Medicines can help in the treatment of a bladder infection:  · Take antibiotics until they are used up, even if you feel better. It is important to finish them to make sure the infection has cleared.  · You can use acetaminophen or ibuprofen for pain, fever, or discomfort, unless another medicine was prescribed. If you have chronic liver or kidney disease, talk with your healthcare provider before using these medicines. Also talk with your provider if you've ever had a stomach ulcer or  gastrointestinal bleeding, or are taking blood-thinner medicines.  · If you are given phenazopydridine to reduce burning with urination, it will cause your urine to become a bright orange color. This can stain clothing.  Care and prevention  These self-care steps can help prevent future infections:  · Drink plenty of fluids to prevent dehydration and flush out your bladder. Do this unless you must restrict fluids for other health reasons, or your doctor told you not to.  · Proper cleaning after going to the bathroom is important. Wipe from front to back after using the toilet to prevent the spread of bacteria.  · Urinate more often. Don't try to hold urine in for a long time.  · Wear loose-fitting clothes and cotton underwear. Avoid tight-fitting pants.  · Improve your diet and prevent constipation. Eat more fresh fruit and vegetables, and fiber, and less junk and fatty foods.  · Avoid sex until your symptoms are gone.  · Avoid caffeine, alcohol, and spicy foods. These can irritate your bladder.  · Urinate right after intercourse to flush out your bladder.  · If you use birth control pills and have frequent bladder infections, discuss it with your doctor.  Follow-up care  Call your healthcare provider if all symptoms are not gone after 3 days of treatment. This is especially important if you have repeat infections.  If a culture was done, you will be told if your treatment needs to be changed. If directed, you can call to find out the results.  If X-rays were done, you will be told if the results will affect your treatment.  Call 911  Call 911 if any of the following occur:  · Trouble breathing  · Hard to wake up or confusion  · Fainting or loss of consciousness  · Rapid heart rate  When to seek medical advice  Call your healthcare provider right away if any of these occur:  · Fever of 100.4ºF (38.0ºC) or higher, or as directed by your healthcare provider  · Symptoms are not better by the third day of  treatment  · Back or belly (abdominal) pain that gets worse  · Repeated vomiting, or unable to keep medicine down  · Weakness or dizziness  · Vaginal discharge  · Pain, redness, or swelling in the outer vaginal area (labia)  Date Last Reviewed: 10/1/2016  © 3845-6191 [employer]. [address] [place] [id number]. All rights reserved. This information is not intended as a substitute for professional medical care. Always follow your healthcare professional's instructions.      Sinusitis (Antibiotic Treatment)    The sinuses are air-filled spaces within the bones of the face. They connect to the inside of the nose. Sinusitis is an inflammation of the tissue lining the sinus cavity. Sinus inflammation can occur during a cold. It can also be due to allergies to pollens and other particles in the air. Sinusitis can cause symptoms of sinus congestion and fullness. A sinus infection causes fever, headache and facial pain. There is often green or yellow drainage from the nose or into the back of the throat (post-nasal drip). You have been given antibiotics to treat this condition.  Home care:  · Take the full course of antibiotics as instructed. Do not stop taking them, even if you feel better.  · Drink plenty of water, hot tea, and other liquids. This may help thin mucus. It also may promote sinus drainage.  · Heat may help soothe painful areas of the face. Use a towel soaked in hot water. Or,  the shower and direct the hot spray onto your face. Using a vaporizer along with a menthol rub at night may also help.   · An expectorant containing guaifenesin may help thin the mucus and promote drainage from the sinuses.  · Over-the-counter decongestants may be used unless a similar medicine was prescribed. Nasal sprays work the fastest. Use one that contains phenylephrine or oxymetazoline. First blow the nose gently. Then use the spray. Do not use these medicines more often than directed on the  label or symptoms may get worse. You may also use tablets containing pseudoephedrine. Avoid products that combine ingredients, because side effects may be increased. Read labels. You can also ask the pharmacist for help. (NOTE: Persons with high blood pressure should not use decongestants. They can raise blood pressure.)  · Over-the-counter antihistamines may help if allergies contributed to your sinusitis.    · Do not use nasal rinses or irrigation during an acute sinus infection, unless told to by your health care provider. Rinsing may spread the infection to other sinuses.  · Use acetaminophen or ibuprofen to control pain, unless another pain medicine was prescribed. (If you have chronic liver or kidney disease or ever had a stomach ulcer, talk with your doctor before using these medicines. Aspirin should never be used in anyone under 18 years of age who is ill with a fever. It may cause severe liver damage.)  · Don't smoke. This can worsen symptoms.  Follow-up care  Follow up with your healthcare provider or our staff if you are not improving within the next week.  When to seek medical advice  Call your healthcare provider if any of these occur:  · Facial pain or headache becoming more severe  · Stiff neck  · Unusual drowsiness or confusion  · Swelling of the forehead or eyelids  · Vision problems, including blurred or double vision  · Fever of 100.4ºF (38ºC) or higher, or as directed by your healthcare provider  · Seizure  · Breathing problems  · Symptoms not resolving within 10 days  Date Last Reviewed: 4/13/2015  © 4632-2646 [employer]. [address] [place] [address], [place] [address]. All rights reserved. This information is not intended as a substitute for professional medical care. Always follow your healthcare professional's instructions.

## 2019-12-16 ENCOUNTER — TELEPHONE (OUTPATIENT)
Dept: UROLOGY | Facility: CLINIC | Age: 69
End: 2019-12-16

## 2019-12-18 ENCOUNTER — TELEPHONE (OUTPATIENT)
Dept: URGENT CARE | Facility: CLINIC | Age: 69
End: 2019-12-18

## 2019-12-18 ENCOUNTER — PATIENT MESSAGE (OUTPATIENT)
Dept: ADMINISTRATIVE | Facility: OTHER | Age: 69
End: 2019-12-18

## 2019-12-18 LAB — BACTERIA UR CULT: ABNORMAL

## 2020-01-03 ENCOUNTER — CLINICAL SUPPORT (OUTPATIENT)
Dept: INTERNAL MEDICINE | Facility: CLINIC | Age: 70
End: 2020-01-03
Payer: MEDICARE

## 2020-01-03 DIAGNOSIS — J30.81 CHRONIC ALLERGIC RHINITIS DUE TO ANIMAL HAIR AND DANDER: ICD-10-CM

## 2020-01-03 DIAGNOSIS — J30.9 CHRONIC ALLERGIC RHINITIS: ICD-10-CM

## 2020-01-03 PROCEDURE — 99499 UNLISTED E&M SERVICE: CPT | Mod: HCNC,S$GLB,, | Performed by: ALLERGY & IMMUNOLOGY

## 2020-01-03 PROCEDURE — 95115 IMMUNOTHERAPY ONE INJECTION: CPT | Mod: HCNC,S$GLB,, | Performed by: FAMILY MEDICINE

## 2020-01-03 PROCEDURE — 95115 PR IMMUNOTHERAPY, ONE INJECTION: ICD-10-PCS | Mod: HCNC,S$GLB,, | Performed by: FAMILY MEDICINE

## 2020-01-03 PROCEDURE — 99499 NO LOS: ICD-10-PCS | Mod: HCNC,S$GLB,, | Performed by: ALLERGY & IMMUNOLOGY

## 2020-01-04 RX ORDER — AZELASTINE 1 MG/ML
SPRAY, METERED NASAL
Qty: 120 ML | Refills: 11 | Status: SHIPPED | OUTPATIENT
Start: 2020-01-04 | End: 2020-12-17 | Stop reason: SDUPTHER

## 2020-01-04 RX ORDER — FLUTICASONE PROPIONATE 50 MCG
SPRAY, SUSPENSION (ML) NASAL
Qty: 48 G | Refills: 4 | Status: SHIPPED | OUTPATIENT
Start: 2020-01-04 | End: 2020-12-17 | Stop reason: SDUPTHER

## 2020-01-04 NOTE — PROGRESS NOTES
"Education was done via explanation of sleep study process and procedure. All questions were answered.     Pt. tolerated CPAP well. Short supine REM sleep obtained on 14. Questionable optimal pressure of 14.    Low sat of 82% was observed in study. EKG revealed rare PVC. Pt's own Medium Durand LT Nasal Pillows was used during CPAP titration. Pt. response to titration in a.m. "Not use to sleeping on this side of the bed" Thank you letter was given in a.m.  "
Yes...

## 2020-01-10 ENCOUNTER — TELEPHONE (OUTPATIENT)
Dept: NEPHROLOGY | Facility: CLINIC | Age: 70
End: 2020-01-10

## 2020-01-24 ENCOUNTER — CLINICAL SUPPORT (OUTPATIENT)
Dept: INTERNAL MEDICINE | Facility: CLINIC | Age: 70
End: 2020-01-24
Payer: MEDICARE

## 2020-01-24 DIAGNOSIS — J30.9 CHRONIC ALLERGIC RHINITIS: ICD-10-CM

## 2020-01-24 PROCEDURE — 95115 PR IMMUNOTHERAPY, ONE INJECTION: ICD-10-PCS | Mod: HCNC,S$GLB,, | Performed by: FAMILY MEDICINE

## 2020-01-24 PROCEDURE — 99499 UNLISTED E&M SERVICE: CPT | Mod: HCNC,S$GLB,, | Performed by: ALLERGY & IMMUNOLOGY

## 2020-01-24 PROCEDURE — 99499 NO LOS: ICD-10-PCS | Mod: HCNC,S$GLB,, | Performed by: ALLERGY & IMMUNOLOGY

## 2020-01-24 PROCEDURE — 95115 IMMUNOTHERAPY ONE INJECTION: CPT | Mod: HCNC,S$GLB,, | Performed by: FAMILY MEDICINE

## 2020-01-27 ENCOUNTER — PATIENT OUTREACH (OUTPATIENT)
Dept: OTHER | Facility: OTHER | Age: 70
End: 2020-01-27

## 2020-01-27 ENCOUNTER — OFFICE VISIT (OUTPATIENT)
Dept: INTERNAL MEDICINE | Facility: CLINIC | Age: 70
End: 2020-01-27
Payer: MEDICARE

## 2020-01-27 VITALS
DIASTOLIC BLOOD PRESSURE: 64 MMHG | HEART RATE: 75 BPM | WEIGHT: 241.19 LBS | BODY MASS INDEX: 42.72 KG/M2 | SYSTOLIC BLOOD PRESSURE: 130 MMHG | OXYGEN SATURATION: 96 % | TEMPERATURE: 98 F

## 2020-01-27 DIAGNOSIS — J30.1 SEASONAL ALLERGIC RHINITIS DUE TO POLLEN: ICD-10-CM

## 2020-01-27 DIAGNOSIS — R39.89 SUSPECTED UTI: Primary | ICD-10-CM

## 2020-01-27 LAB
BACTERIA #/AREA URNS AUTO: ABNORMAL /HPF
BILIRUB UR QL STRIP: NEGATIVE
CLARITY UR REFRACT.AUTO: ABNORMAL
COLOR UR AUTO: ABNORMAL
GLUCOSE UR QL STRIP: NEGATIVE
HGB UR QL STRIP: NEGATIVE
KETONES UR QL STRIP: NEGATIVE
LEUKOCYTE ESTERASE UR QL STRIP: ABNORMAL
MICROSCOPIC COMMENT: ABNORMAL
NITRITE UR QL STRIP: NEGATIVE
PH UR STRIP: 7 [PH] (ref 5–8)
PROT UR QL STRIP: NEGATIVE
RBC #/AREA URNS AUTO: 0 /HPF (ref 0–4)
SP GR UR STRIP: 1.01 (ref 1–1.03)
SQUAMOUS #/AREA URNS AUTO: 1 /HPF
URN SPEC COLLECT METH UR: ABNORMAL
WBC #/AREA URNS AUTO: 5 /HPF (ref 0–5)

## 2020-01-27 PROCEDURE — 99999 PR PBB SHADOW E&M-EST. PATIENT-LVL III: ICD-10-PCS | Mod: PBBFAC,HCNC,, | Performed by: INTERNAL MEDICINE

## 2020-01-27 PROCEDURE — 3075F PR MOST RECENT SYSTOLIC BLOOD PRESS GE 130-139MM HG: ICD-10-PCS | Mod: HCNC,CPTII,S$GLB, | Performed by: INTERNAL MEDICINE

## 2020-01-27 PROCEDURE — 87186 SC STD MICRODIL/AGAR DIL: CPT | Mod: HCNC

## 2020-01-27 PROCEDURE — 3075F SYST BP GE 130 - 139MM HG: CPT | Mod: HCNC,CPTII,S$GLB, | Performed by: INTERNAL MEDICINE

## 2020-01-27 PROCEDURE — 99214 PR OFFICE/OUTPT VISIT, EST, LEVL IV, 30-39 MIN: ICD-10-PCS | Mod: HCNC,S$GLB,, | Performed by: INTERNAL MEDICINE

## 2020-01-27 PROCEDURE — 1126F AMNT PAIN NOTED NONE PRSNT: CPT | Mod: HCNC,S$GLB,, | Performed by: INTERNAL MEDICINE

## 2020-01-27 PROCEDURE — 81001 URINALYSIS AUTO W/SCOPE: CPT | Mod: HCNC

## 2020-01-27 PROCEDURE — 87088 URINE BACTERIA CULTURE: CPT | Mod: HCNC

## 2020-01-27 PROCEDURE — 1101F PR PT FALLS ASSESS DOC 0-1 FALLS W/OUT INJ PAST YR: ICD-10-PCS | Mod: HCNC,CPTII,S$GLB, | Performed by: INTERNAL MEDICINE

## 2020-01-27 PROCEDURE — 1159F MED LIST DOCD IN RCRD: CPT | Mod: HCNC,S$GLB,, | Performed by: INTERNAL MEDICINE

## 2020-01-27 PROCEDURE — 99214 OFFICE O/P EST MOD 30 MIN: CPT | Mod: HCNC,S$GLB,, | Performed by: INTERNAL MEDICINE

## 2020-01-27 PROCEDURE — 1101F PT FALLS ASSESS-DOCD LE1/YR: CPT | Mod: HCNC,CPTII,S$GLB, | Performed by: INTERNAL MEDICINE

## 2020-01-27 PROCEDURE — 87077 CULTURE AEROBIC IDENTIFY: CPT | Mod: HCNC

## 2020-01-27 PROCEDURE — 3078F DIAST BP <80 MM HG: CPT | Mod: HCNC,CPTII,S$GLB, | Performed by: INTERNAL MEDICINE

## 2020-01-27 PROCEDURE — 87086 URINE CULTURE/COLONY COUNT: CPT | Mod: HCNC

## 2020-01-27 PROCEDURE — 1126F PR PAIN SEVERITY QUANTIFIED, NO PAIN PRESENT: ICD-10-PCS | Mod: HCNC,S$GLB,, | Performed by: INTERNAL MEDICINE

## 2020-01-27 PROCEDURE — 99999 PR PBB SHADOW E&M-EST. PATIENT-LVL III: CPT | Mod: PBBFAC,HCNC,, | Performed by: INTERNAL MEDICINE

## 2020-01-27 PROCEDURE — 1159F PR MEDICATION LIST DOCUMENTED IN MEDICAL RECORD: ICD-10-PCS | Mod: HCNC,S$GLB,, | Performed by: INTERNAL MEDICINE

## 2020-01-27 PROCEDURE — 3078F PR MOST RECENT DIASTOLIC BLOOD PRESSURE < 80 MM HG: ICD-10-PCS | Mod: HCNC,CPTII,S$GLB, | Performed by: INTERNAL MEDICINE

## 2020-01-27 RX ORDER — CEPHALEXIN 500 MG/1
500 CAPSULE ORAL 4 TIMES DAILY
Qty: 28 CAPSULE | Refills: 0 | Status: SHIPPED | OUTPATIENT
Start: 2020-01-27 | End: 2020-02-03

## 2020-01-27 NOTE — PROGRESS NOTES
" Patient ID: Vivienne Jose is a 69 y.o. female.    Chief Complaint: Urinary Tract Infection (x 3 days )    HPI Vivienne is a 69 y.o. female with  Recurrent urinary tract infections who presents with a chief complaint of urinary tract infection.  Onset was 1.5 days ago.  She feels a discomfort in her left side back and has associated symptoms of pain with urination, shaking, and chills.  She has been drinking extra fluids and taking over-the-counter AZO but with no relief.    Nothing is making symptoms better.Symptoms are constant in duration.  Of note, patient has a history of recurrent urinary tract infections.  She has seen Urogynecology, Urology, and Nephrology as result.    She also reports that recently she had a sinus infection and so an antibiotic was chosen to treat her UTI and sinusitis.  She still occasionally has a "ping" of pain in the right ear.  And she has associated sinus pressure.  She currently follows with an allergist.  She uses Flonase nasal spray, Astelin nasal spray, 2 oral allergy medications and gets allergy shots.  However she continues to have allergy symptoms.    Review of Systems   HENT: Positive for ear pain and sinus pressure.    Genitourinary: Positive for dysuria. Negative for hematuria.   All other systems reviewed and are negative.        Objective:     Vitals:    01/27/20 1512   BP: 130/64   Pulse: 75   Temp: 97.6 °F (36.4 °C)        Physical Exam   Constitutional: She is oriented to person, place, and time. She appears well-developed and well-nourished. No distress.   HENT:   Head: Normocephalic and atraumatic.   Right Ear: External ear normal.   Left Ear: External ear normal.   Nose: Nose normal.   Mouth/Throat: Oropharynx is clear and moist. No oropharyngeal exudate.   Mild erythema of bilateral auditory canals. No fluid present   Eyes: Conjunctivae and EOM are normal. Right eye exhibits no discharge. Left eye exhibits no discharge. No scleral icterus.   Cardiovascular: Normal " rate, regular rhythm, normal heart sounds and intact distal pulses. Exam reveals no gallop and no friction rub.   No murmur heard.  Pulmonary/Chest: Effort normal and breath sounds normal. No respiratory distress. She has no wheezes. She has no rales.   Neurological: She is alert and oriented to person, place, and time.   Skin: Skin is warm and dry. She is not diaphoretic.   Psychiatric: She has a normal mood and affect. Her behavior is normal. Judgment and thought content normal.   Vitals reviewed.      Assessment:       1. Suspected UTI Active   2. Seasonal allergic rhinitis due to pollen Active       Plan:     No signs of sinus infection or ear infection on exam today.  Recommend follow-up with allergist.  May want to consider ENT referral in the future if not done already.    Suspected UTI  -     Urinalysis  -     Urine culture  -     cephALEXin (KEFLEX) 500 MG capsule; Take 1 capsule (500 mg total) by mouth 4 (four) times daily. for 7 days  Dispense: 28 capsule; Refill: 0    Seasonal allergic rhinitis due to pollen      RTC PRN      Warning signs discussed, patient to call with any further issues or worsening of symptoms.       Parts of the above note were dictated using a voice dictation software. Please excuse any grammatical or typographical errors.

## 2020-01-27 NOTE — PROGRESS NOTES
Digital Medicine: Clinician Follow-Up      Called patient to introduce myself as her temporary clinician.     Hypertension:   Patient endorses adherence to medication regimen. Patient denies hypotensive s/sx (lightheadedness, dizziness, nausea, fatigue); patient denies hypertensive s/sx (SOB, CP, severe headaches, changes in vision). Instructed patient to seek medical care if BP > 180/110 and is accompanied by hypertensive s/sx associated, patient confirms understanding.       Diabetes:  Patient endorses adherence to medication regimen. Patient denies hypoglycemic s/sx (dizziness, extreme hunger, headaches, confusion, trouble concentrating, sweating, shaking, blurred vision, personality changes); patient denies hyperglycemic s/sx (increased thirst, increased urination, headaches, trouble concentrating, blurred vision, fatigue).    Patient admits to having a poor diet and no physical activity. She states that she knows this is the reason for her increasing gluicose readings. She also states that eating healthy would be easier with support from her , but in general she does not like to cook and eating out is much easier.      Patient denies having questions or concerns. Patient has my contact information and knows to call with any concerns or clinical changes.        The history is provided by the patient. No  was used.     Follow Up  Follow-up reason(s): reading review      Readings are trending up       INTERVENTION(S)  reviewed appropriate dose schedule, recommended diet modifications, recommended med change, encouragement/support and goal setting    PLAN  patient verbalizes understanding, patient unable to make changes at this time, additional monitoring needed and Health  follow up      Assessment:  HTN - Reviewed recent readings. Per 2017 ACC/ AHA HTN guidelines (goal of BP < 130/80), current 30-day average is well controlled.      DM - Reviewed recent readings. Per ADA guidelines  (goal A1C < 7%), DM needs to be addressed more thoroughly today. Long-acting insulin can be increased as patient has multiple elevated fasting readings.     Plan:  HTN - Continue current medication regimen. I will continue to monitor regularly and will follow-up in 3 weeks after follow-up with PCP and labs, sooner if blood pressure begins to trend upward or downward.     DM - Continue current medication regimen. I will continue to monitor regularly and will follow-up in 3 weeks, sooner if blood sugar begins to trend upward or downward. Recommend increasing Tresiba to 33 units nightly if A1C and blood glucose readings continues to be elevated.      There are no preventive care reminders to display for this patient.    Last 5 Patient Entered Readings                                      Current 30 Day Average: 128/63     Recent Readings 1/25/2020 1/24/2020 1/24/2020 1/22/2020 1/20/2020    SBP (mmHg) 132 132 132 113 129    DBP (mmHg) 66 61 66 53 61    Pulse 66 65 67 65 64        Last 6 Patient Entered Readings                                          Most Recent A1c: 7.5% on 11/8/2019  (Goal: 7%)     Recent Readings 1/27/2020 1/26/2020 1/26/2020 1/25/2020 1/25/2020    Blood Glucose (mg/dL) 235 197 164 169 156           Hypertension Medications             amLODIPine (NORVASC) 5 MG tablet Take 1 tablet (5 mg total) by mouth every evening.    chlorthalidone (HYGROTEN) 25 MG Tab Take 1 tablet (25 mg total) by mouth once daily.    valsartan (DIOVAN) 160 MG tablet Take 1 tablet (160 mg total) by mouth 2 (two) times daily.        Diabetes Medications             glipiZIDE (GLUCOTROL) 5 MG tablet Take 1 tablet (5 mg total) by mouth 2 (two) times daily before meals.    glucagon (human recombinant) inj 1mg/mL kit Inject 1 mL (1 mg total) into the muscle as needed.    insulin degludec (TRESIBA FLEXTOUCH U-100) 100 unit/mL (3 mL) InPn Inject 30 Units into the skin every evening.    liraglutide 0.6 mg/0.1 mL, 18 mg/3 mL, subq PNIJ  (VICTOZA 3-TAWANA) 0.6 mg/0.1 mL (18 mg/3 mL) PnIj INJECT 1.8 MG INTO THE SKIN ONCE DAILY.    metFORMIN (GLUCOPHAGE) 1000 MG tablet Take 1 tablet (1,000 mg total) by mouth 2 (two) times daily with meals.               Screenings

## 2020-01-31 LAB — BACTERIA UR CULT: ABNORMAL

## 2020-02-07 ENCOUNTER — OFFICE VISIT (OUTPATIENT)
Dept: INTERNAL MEDICINE | Facility: CLINIC | Age: 70
End: 2020-02-07
Payer: MEDICARE

## 2020-02-07 ENCOUNTER — LAB VISIT (OUTPATIENT)
Dept: LAB | Facility: HOSPITAL | Age: 70
End: 2020-02-07
Attending: INTERNAL MEDICINE
Payer: MEDICARE

## 2020-02-07 VITALS
DIASTOLIC BLOOD PRESSURE: 68 MMHG | SYSTOLIC BLOOD PRESSURE: 124 MMHG | WEIGHT: 241.38 LBS | HEART RATE: 72 BPM | OXYGEN SATURATION: 98 % | BODY MASS INDEX: 42.76 KG/M2

## 2020-02-07 DIAGNOSIS — E11.22 TYPE 2 DIABETES MELLITUS WITH STAGE 3 CHRONIC KIDNEY DISEASE, WITH LONG-TERM CURRENT USE OF INSULIN: ICD-10-CM

## 2020-02-07 DIAGNOSIS — I10 ESSENTIAL HYPERTENSION: ICD-10-CM

## 2020-02-07 DIAGNOSIS — E11.69 DYSLIPIDEMIA ASSOCIATED WITH TYPE 2 DIABETES MELLITUS: ICD-10-CM

## 2020-02-07 DIAGNOSIS — E78.5 DYSLIPIDEMIA ASSOCIATED WITH TYPE 2 DIABETES MELLITUS: ICD-10-CM

## 2020-02-07 DIAGNOSIS — N18.30 CHRONIC KIDNEY DISEASE, STAGE III (MODERATE): ICD-10-CM

## 2020-02-07 DIAGNOSIS — E11.59 HYPERTENSION ASSOCIATED WITH DIABETES: ICD-10-CM

## 2020-02-07 DIAGNOSIS — I15.2 HYPERTENSION ASSOCIATED WITH DIABETES: ICD-10-CM

## 2020-02-07 DIAGNOSIS — K21.9 GASTROESOPHAGEAL REFLUX DISEASE, ESOPHAGITIS PRESENCE NOT SPECIFIED: ICD-10-CM

## 2020-02-07 DIAGNOSIS — R39.89 SUSPECTED UTI: ICD-10-CM

## 2020-02-07 DIAGNOSIS — N18.30 TYPE 2 DIABETES MELLITUS WITH STAGE 3 CHRONIC KIDNEY DISEASE, WITH LONG-TERM CURRENT USE OF INSULIN: ICD-10-CM

## 2020-02-07 DIAGNOSIS — E03.9 HYPOTHYROIDISM, UNSPECIFIED TYPE: ICD-10-CM

## 2020-02-07 DIAGNOSIS — Z79.4 TYPE 2 DIABETES MELLITUS WITH STAGE 3 CHRONIC KIDNEY DISEASE, WITH LONG-TERM CURRENT USE OF INSULIN: ICD-10-CM

## 2020-02-07 DIAGNOSIS — I25.10 CORONARY ARTERY DISEASE, ANGINA PRESENCE UNSPECIFIED, UNSPECIFIED VESSEL OR LESION TYPE, UNSPECIFIED WHETHER NATIVE OR TRANSPLANTED HEART: ICD-10-CM

## 2020-02-07 DIAGNOSIS — N20.0 KIDNEY STONES: ICD-10-CM

## 2020-02-07 DIAGNOSIS — E66.01 SEVERE OBESITY (BMI >= 40): ICD-10-CM

## 2020-02-07 LAB
ALBUMIN SERPL BCP-MCNC: 3.5 G/DL (ref 3.5–5.2)
ANION GAP SERPL CALC-SCNC: 10 MMOL/L (ref 8–16)
BUN SERPL-MCNC: 16 MG/DL (ref 8–23)
CALCIUM SERPL-MCNC: 10.1 MG/DL (ref 8.7–10.5)
CHLORIDE SERPL-SCNC: 101 MMOL/L (ref 95–110)
CO2 SERPL-SCNC: 30 MMOL/L (ref 23–29)
CREAT SERPL-MCNC: 0.9 MG/DL (ref 0.5–1.4)
EST. GFR  (AFRICAN AMERICAN): >60 ML/MIN/1.73 M^2
EST. GFR  (NON AFRICAN AMERICAN): >60 ML/MIN/1.73 M^2
GLUCOSE SERPL-MCNC: 147 MG/DL (ref 70–110)
PHOSPHATE SERPL-MCNC: 3.3 MG/DL (ref 2.7–4.5)
POTASSIUM SERPL-SCNC: 3.6 MMOL/L (ref 3.5–5.1)
PTH-INTACT SERPL-MCNC: 27 PG/ML (ref 9–77)
SODIUM SERPL-SCNC: 141 MMOL/L (ref 136–145)

## 2020-02-07 PROCEDURE — 3078F DIAST BP <80 MM HG: CPT | Mod: HCNC,CPTII,S$GLB, | Performed by: INTERNAL MEDICINE

## 2020-02-07 PROCEDURE — 83970 ASSAY OF PARATHORMONE: CPT | Mod: HCNC

## 2020-02-07 PROCEDURE — 3074F SYST BP LT 130 MM HG: CPT | Mod: HCNC,CPTII,S$GLB, | Performed by: INTERNAL MEDICINE

## 2020-02-07 PROCEDURE — 80069 RENAL FUNCTION PANEL: CPT | Mod: HCNC

## 2020-02-07 PROCEDURE — 1125F AMNT PAIN NOTED PAIN PRSNT: CPT | Mod: HCNC,S$GLB,, | Performed by: INTERNAL MEDICINE

## 2020-02-07 PROCEDURE — 99214 PR OFFICE/OUTPT VISIT, EST, LEVL IV, 30-39 MIN: ICD-10-PCS | Mod: HCNC,S$GLB,, | Performed by: INTERNAL MEDICINE

## 2020-02-07 PROCEDURE — 1101F PR PT FALLS ASSESS DOC 0-1 FALLS W/OUT INJ PAST YR: ICD-10-PCS | Mod: HCNC,CPTII,S$GLB, | Performed by: INTERNAL MEDICINE

## 2020-02-07 PROCEDURE — 99999 PR PBB SHADOW E&M-EST. PATIENT-LVL III: ICD-10-PCS | Mod: PBBFAC,HCNC,, | Performed by: INTERNAL MEDICINE

## 2020-02-07 PROCEDURE — 87086 URINE CULTURE/COLONY COUNT: CPT | Mod: HCNC

## 2020-02-07 PROCEDURE — 99214 OFFICE O/P EST MOD 30 MIN: CPT | Mod: HCNC,S$GLB,, | Performed by: INTERNAL MEDICINE

## 2020-02-07 PROCEDURE — 36415 COLL VENOUS BLD VENIPUNCTURE: CPT | Mod: HCNC,PO

## 2020-02-07 PROCEDURE — 1125F PR PAIN SEVERITY QUANTIFIED, PAIN PRESENT: ICD-10-PCS | Mod: HCNC,S$GLB,, | Performed by: INTERNAL MEDICINE

## 2020-02-07 PROCEDURE — 3078F PR MOST RECENT DIASTOLIC BLOOD PRESSURE < 80 MM HG: ICD-10-PCS | Mod: HCNC,CPTII,S$GLB, | Performed by: INTERNAL MEDICINE

## 2020-02-07 PROCEDURE — 3074F PR MOST RECENT SYSTOLIC BLOOD PRESSURE < 130 MM HG: ICD-10-PCS | Mod: HCNC,CPTII,S$GLB, | Performed by: INTERNAL MEDICINE

## 2020-02-07 PROCEDURE — 3051F PR MOST RECENT HEMOGLOBIN A1C LEVEL 7.0 - < 8.0%: ICD-10-PCS | Mod: HCNC,CPTII,S$GLB, | Performed by: INTERNAL MEDICINE

## 2020-02-07 PROCEDURE — 99999 PR PBB SHADOW E&M-EST. PATIENT-LVL III: CPT | Mod: PBBFAC,HCNC,, | Performed by: INTERNAL MEDICINE

## 2020-02-07 PROCEDURE — 1159F PR MEDICATION LIST DOCUMENTED IN MEDICAL RECORD: ICD-10-PCS | Mod: HCNC,S$GLB,, | Performed by: INTERNAL MEDICINE

## 2020-02-07 PROCEDURE — 1159F MED LIST DOCD IN RCRD: CPT | Mod: HCNC,S$GLB,, | Performed by: INTERNAL MEDICINE

## 2020-02-07 PROCEDURE — 1101F PT FALLS ASSESS-DOCD LE1/YR: CPT | Mod: HCNC,CPTII,S$GLB, | Performed by: INTERNAL MEDICINE

## 2020-02-07 PROCEDURE — 3051F HG A1C>EQUAL 7.0%<8.0%: CPT | Mod: HCNC,CPTII,S$GLB, | Performed by: INTERNAL MEDICINE

## 2020-02-07 RX ORDER — CHLORTHALIDONE 25 MG/1
25 TABLET ORAL DAILY
Qty: 90 TABLET | Refills: 3 | Status: SHIPPED | OUTPATIENT
Start: 2020-02-07 | End: 2021-02-03

## 2020-02-07 RX ORDER — AMLODIPINE BESYLATE 5 MG/1
5 TABLET ORAL NIGHTLY
Qty: 90 TABLET | Refills: 3 | Status: SHIPPED | OUTPATIENT
Start: 2020-02-07 | End: 2021-03-29

## 2020-02-07 RX ORDER — LOVASTATIN 40 MG/1
TABLET ORAL
Qty: 90 TABLET | Refills: 3 | Status: SHIPPED | OUTPATIENT
Start: 2020-02-07 | End: 2021-03-29

## 2020-02-07 RX ORDER — OMEPRAZOLE 20 MG/1
20 CAPSULE, DELAYED RELEASE ORAL DAILY PRN
Qty: 90 CAPSULE | Refills: 1 | Status: SHIPPED | OUTPATIENT
Start: 2020-02-07 | End: 2021-03-01

## 2020-02-07 RX ORDER — GLIPIZIDE 5 MG/1
5 TABLET ORAL
Qty: 180 TABLET | Refills: 3 | Status: SHIPPED | OUTPATIENT
Start: 2020-02-07 | End: 2021-03-08 | Stop reason: SDUPTHER

## 2020-02-07 RX ORDER — GABAPENTIN 600 MG/1
600 TABLET ORAL 2 TIMES DAILY
Qty: 180 TABLET | Refills: 3 | Status: SHIPPED | OUTPATIENT
Start: 2020-02-07 | End: 2021-03-29

## 2020-02-07 RX ORDER — INSULIN DEGLUDEC 100 U/ML
30 INJECTION, SOLUTION SUBCUTANEOUS NIGHTLY
Qty: 15 SYRINGE | Refills: 4
Start: 2020-02-07 | End: 2020-02-13 | Stop reason: DRUGHIGH

## 2020-02-07 RX ORDER — LEVOTHYROXINE SODIUM 75 UG/1
75 TABLET ORAL DAILY
Qty: 90 TABLET | Refills: 3 | Status: SHIPPED | OUTPATIENT
Start: 2020-02-07 | End: 2021-03-01

## 2020-02-07 RX ORDER — VALSARTAN 160 MG/1
160 TABLET ORAL 2 TIMES DAILY
Qty: 180 TABLET | Refills: 3 | Status: SHIPPED | OUTPATIENT
Start: 2020-02-07 | End: 2021-03-29

## 2020-02-07 RX ORDER — METFORMIN HYDROCHLORIDE 1000 MG/1
1000 TABLET ORAL 2 TIMES DAILY WITH MEALS
Qty: 180 TABLET | Refills: 3 | Status: SHIPPED | OUTPATIENT
Start: 2020-02-07 | End: 2021-03-29

## 2020-02-07 NOTE — PROGRESS NOTES
" Patient ID: Vivienne Jose is a 69 y.o. female.    Chief Complaint: Diabetes (6 month visit )    DONAVON Palafox is a 69 y.o. Female with type II DM, HTN, HLD, GERD, CAD, hypothyroidism and recurrent UTIs who presents to establish care and for routine follow up of medical conditions. She presents with her  today.  CAD: Previously followed with cardiologist Dr. Diez. Due for 6 month follow up with cardiology. States that cardiology always tells her to lose weight but she does not  Type II DM: Follows with endocrinology Charity Robledo. Last A1c 7.5 in November. On Tresiba, victoza, metformin, glipizide. On statin for associated HLD. Due for eye exam in May  Has history of recurrent UTIs. Has "no real symptoms" today but feels like UTI is coming on.  No further acute complaints or concerns today.  All health maintenance reviewed with patient today    Review of Systems   All other systems reviewed and are negative.        Objective:     Vitals:    02/07/20 0830   BP: 124/68   Pulse: 72        Physical Exam   Constitutional: She is oriented to person, place, and time. She appears well-developed and well-nourished. No distress.   HENT:   Head: Normocephalic and atraumatic.   Right Ear: External ear normal.   Left Ear: External ear normal.   Nose: Nose normal.   Mouth/Throat: Oropharynx is clear and moist. No oropharyngeal exudate.   Eyes: Conjunctivae and EOM are normal. Right eye exhibits no discharge. Left eye exhibits no discharge. No scleral icterus.   Cardiovascular: Normal rate, regular rhythm, normal heart sounds and intact distal pulses. Exam reveals no gallop and no friction rub.   No murmur heard.  Pulmonary/Chest: Effort normal and breath sounds normal. No stridor. No respiratory distress. She has no wheezes. She has no rales.   Neurological: She is alert and oriented to person, place, and time.   Skin: Skin is warm and dry. She is not diaphoretic.   Psychiatric: She has a normal mood and affect. Her behavior is " normal. Judgment and thought content normal.   Vitals reviewed.      Assessment:       1. Type 2 diabetes mellitus with stage 3 chronic kidney disease, with long-term current use of insulin Well controlled   2. Hypertension associated with diabetes Well controlled   3. Hypothyroidism, unspecified type Well controlled   4. Dyslipidemia associated with type 2 diabetes mellitus Well controlled   5. Gastroesophageal reflux disease, esophagitis presence not specified Well controlled   6. Suspected UTI Active   7. Chronic kidney disease, stage III (moderate) Stable   8. Severe obesity (BMI >= 40) Active   9. Coronary artery disease, angina presence unspecified, unspecified vessel or lesion type, unspecified whether native or transplanted heart Chronic       Plan:         Type 2 diabetes mellitus with stage 3 chronic kidney disease, with long-term current use of insulin  Comments:   continue current medications and follow up with Endocrinology. F/u with optometry for eye exam  Orders:  -     gabapentin (NEURONTIN) 600 MG tablet; Take 1 tablet (600 mg total) by mouth 2 (two) times daily.  Dispense: 180 tablet; Refill: 3  -     glipiZIDE (GLUCOTROL) 5 MG tablet; Take 1 tablet (5 mg total) by mouth 2 (two) times daily before meals.  Dispense: 180 tablet; Refill: 3  -     insulin degludec (TRESIBA FLEXTOUCH U-100) 100 unit/mL (3 mL) InPn; Inject 30 Units into the skin every evening.  Dispense: 15 Syringe; Refill: 4  -     liraglutide 0.6 mg/0.1 mL, 18 mg/3 mL, subq PNIJ (VICTOZA 3-TAWANA) 0.6 mg/0.1 mL (18 mg/3 mL) PnIj; INJECT 1.8 MG INTO THE SKIN ONCE DAILY.  Dispense: 27 mL; Refill: 11  -     metFORMIN (GLUCOPHAGE) 1000 MG tablet; Take 1 tablet (1,000 mg total) by mouth 2 (two) times daily with meals.  Dispense: 180 tablet; Refill: 3    Hypertension associated with diabetes  Comments:  continue current regimen   Orders:  -     amLODIPine (NORVASC) 5 MG tablet; Take 1 tablet (5 mg total) by mouth every evening.  Dispense: 90  tablet; Refill: 3  -     chlorthalidone (HYGROTEN) 25 MG Tab; Take 1 tablet (25 mg total) by mouth once daily.  Dispense: 90 tablet; Refill: 3  -     valsartan (DIOVAN) 160 MG tablet; Take 1 tablet (160 mg total) by mouth 2 (two) times daily.  Dispense: 180 tablet; Refill: 3    Hypothyroidism, unspecified type  Comments:  continue current regimen  Orders:  -     levothyroxine (SYNTHROID) 75 MCG tablet; Take 1 tablet (75 mcg total) by mouth once daily.  Dispense: 90 tablet; Refill: 3    Dyslipidemia associated with type 2 diabetes mellitus  Comments:  continue current regimen   Orders:  -     lovastatin (MEVACOR) 40 MG tablet; TAKE 1 TABLET NIGHTLY (SUBSTITUTED FOR MEVACOR)  Dispense: 90 tablet; Refill: 3    Gastroesophageal reflux disease, esophagitis presence not specified  Comments:  continue prilosec prn   Orders:  -     omeprazole (PRILOSEC) 20 MG capsule; Take 1 capsule (20 mg total) by mouth daily as needed.  Dispense: 90 capsule; Refill: 1    Suspected UTI  -     Urinalysis; Future; Expected date: 02/07/2020  -     Urine culture    Chronic kidney disease, stage III (moderate)  Comments:  Plans to see Nephrology later this month.     Severe obesity (BMI >= 40)  Comments:  Pt aware of need for diet, exercise and weight loss    Coronary artery disease, angina presence unspecified, unspecified vessel or lesion type, unspecified whether native or transplanted heart  Comments:  F/u with cardiology        25 minutes spent in room face to face with patient and  with >50% of time spent in discussion of current medical conditions and treatments as above    RTC 6 months        Warning signs discussed, patient to call with any further issues or worsening of symptoms.       Parts of the above note were dictated using a voice dictation software. Please excuse any grammatical or typographical errors.

## 2020-02-08 LAB — BACTERIA UR CULT: NO GROWTH

## 2020-02-10 DIAGNOSIS — E87.6 HYPOKALEMIA: ICD-10-CM

## 2020-02-11 RX ORDER — POTASSIUM CHLORIDE 750 MG/1
TABLET, EXTENDED RELEASE ORAL
Qty: 130 TABLET | Refills: 1 | Status: SHIPPED | OUTPATIENT
Start: 2020-02-11 | End: 2020-06-30

## 2020-02-12 ENCOUNTER — OFFICE VISIT (OUTPATIENT)
Dept: NEPHROLOGY | Facility: CLINIC | Age: 70
End: 2020-02-12
Payer: MEDICARE

## 2020-02-12 VITALS
SYSTOLIC BLOOD PRESSURE: 116 MMHG | WEIGHT: 235.88 LBS | OXYGEN SATURATION: 97 % | BODY MASS INDEX: 41.79 KG/M2 | HEART RATE: 73 BPM | DIASTOLIC BLOOD PRESSURE: 60 MMHG

## 2020-02-12 DIAGNOSIS — N20.0 KIDNEY STONES: Primary | ICD-10-CM

## 2020-02-12 DIAGNOSIS — I10 ESSENTIAL HYPERTENSION: ICD-10-CM

## 2020-02-12 PROCEDURE — 3074F SYST BP LT 130 MM HG: CPT | Mod: HCNC,CPTII,S$GLB, | Performed by: INTERNAL MEDICINE

## 2020-02-12 PROCEDURE — 1125F PR PAIN SEVERITY QUANTIFIED, PAIN PRESENT: ICD-10-PCS | Mod: HCNC,S$GLB,, | Performed by: INTERNAL MEDICINE

## 2020-02-12 PROCEDURE — 3078F DIAST BP <80 MM HG: CPT | Mod: HCNC,CPTII,S$GLB, | Performed by: INTERNAL MEDICINE

## 2020-02-12 PROCEDURE — 99214 PR OFFICE/OUTPT VISIT, EST, LEVL IV, 30-39 MIN: ICD-10-PCS | Mod: HCNC,S$GLB,, | Performed by: INTERNAL MEDICINE

## 2020-02-12 PROCEDURE — 1101F PR PT FALLS ASSESS DOC 0-1 FALLS W/OUT INJ PAST YR: ICD-10-PCS | Mod: HCNC,CPTII,S$GLB, | Performed by: INTERNAL MEDICINE

## 2020-02-12 PROCEDURE — 99214 OFFICE O/P EST MOD 30 MIN: CPT | Mod: HCNC,S$GLB,, | Performed by: INTERNAL MEDICINE

## 2020-02-12 PROCEDURE — 1125F AMNT PAIN NOTED PAIN PRSNT: CPT | Mod: HCNC,S$GLB,, | Performed by: INTERNAL MEDICINE

## 2020-02-12 PROCEDURE — 99999 PR PBB SHADOW E&M-EST. PATIENT-LVL III: CPT | Mod: PBBFAC,HCNC,, | Performed by: INTERNAL MEDICINE

## 2020-02-12 PROCEDURE — 1159F MED LIST DOCD IN RCRD: CPT | Mod: HCNC,S$GLB,, | Performed by: INTERNAL MEDICINE

## 2020-02-12 PROCEDURE — 1159F PR MEDICATION LIST DOCUMENTED IN MEDICAL RECORD: ICD-10-PCS | Mod: HCNC,S$GLB,, | Performed by: INTERNAL MEDICINE

## 2020-02-12 PROCEDURE — 1101F PT FALLS ASSESS-DOCD LE1/YR: CPT | Mod: HCNC,CPTII,S$GLB, | Performed by: INTERNAL MEDICINE

## 2020-02-12 PROCEDURE — 3078F PR MOST RECENT DIASTOLIC BLOOD PRESSURE < 80 MM HG: ICD-10-PCS | Mod: HCNC,CPTII,S$GLB, | Performed by: INTERNAL MEDICINE

## 2020-02-12 PROCEDURE — 3074F PR MOST RECENT SYSTOLIC BLOOD PRESSURE < 130 MM HG: ICD-10-PCS | Mod: HCNC,CPTII,S$GLB, | Performed by: INTERNAL MEDICINE

## 2020-02-12 PROCEDURE — 99999 PR PBB SHADOW E&M-EST. PATIENT-LVL III: ICD-10-PCS | Mod: PBBFAC,HCNC,, | Performed by: INTERNAL MEDICINE

## 2020-02-12 NOTE — PROGRESS NOTES
Subjective:       Patient ID: Vivienne Jose is a 69 y.o. White female who presents for follow-up evaluation of Chronic Kidney Disease    HPI This is a 69-year-old white female with history of diabetes, hypertension, nephrolithiasis, and obstructive sleep apnea is coming in for f/u of kidney stones. No recent stones. She states her blood pressures are stable in the 110's-130's/60's-70's.  Diabetes labile with a1c of 7.5.  Creatinine stable.  Removed kidney stone in December 2016 with several UTI's -sees urology.   Last UTI 2 weeks ago.    PAST MEDICAL HISTORY: Significant for diabetes, hypertension, obstructive   sleep apnea, hyperlipidemia, frequent UTIs,  lithotripsy, and CAD. R knee replacement in 2019.     SOCIAL HISTORY: Does not smoke and does not drink. She works as an    at [employer].     FAMILY HISTORY: No family history of kidney stones. Her daughter has   frequent UTIs secondary to reflux.     Review of Systems   Constitutional: Negative for fatigue.   Eyes: Negative for discharge.   Respiratory: Negative for cough, shortness of breath and wheezing.    Cardiovascular: Negative for chest pain and palpitations.   Gastrointestinal: Negative for abdominal pain, diarrhea, nausea and vomiting.   Genitourinary: Negative for dysuria, frequency, hematuria and urgency.   Skin: Negative for color change and rash.   Psychiatric/Behavioral: Negative for confusion.   All other systems reviewed and are negative.      Objective:      Physical Exam   Constitutional: She is oriented to person, place, and time. She appears well-developed and well-nourished.   HENT:   Right Ear: External ear normal.   Left Ear: External ear normal.   Nose: Nose normal.   Mouth/Throat: Normal dentition.   Eyes: Pupils are equal, round, and reactive to light. Conjunctivae, EOM and lids are normal.   Neck: Trachea normal. No thyroid mass present.   Cardiovascular: Normal rate and regular rhythm. Exam reveals no friction rub.    No murmur heard.  Pulmonary/Chest: Effort normal and breath sounds normal. No respiratory distress. She has no decreased breath sounds. She has no rales.   Abdominal: Soft. Bowel sounds are normal. She exhibits no mass. There is no tenderness. There is no rebound. No hernia.   Musculoskeletal: She exhibits edema.   Trace edema.    Neurological: She is alert and oriented to person, place, and time.   Skin: Skin is warm and dry. No rash noted. No erythema.   Psychiatric: She has a normal mood and affect. Judgment normal. Her mood appears not anxious. She does not exhibit a depressed mood.   Oriented to time, place and person.   Vitals reviewed.      Assessment:       No diagnosis found.    Plan:         This is a 69-year-old white female with a history of   diabetes, hypertension and kidney stones is here for f/u.   1. Nephrolithiasis. She has not had any kidney stones in the past 20   years except for a  6 mm stone which was lithotripsied as this was   believed to be the nidus for her frequent UTIs-2011 and one in dec 2016. Still has UTI's-seen in urology.  Recent urorirsk profile showed low volume-hydrate well.  Low magnesium-on magnesium 600 mg a day. Asked the pt to decrease animal protein intake and to decraese salt and to decrease excessive calcium intake.  Decrease ca supplements to one a day from bid. No stones after recent stone removal-feb 2017 US.  Increase water intake.   2. Hypertension. Blood pressure is stable and was switched from HCTZ to chlorthalidone. Con't to monitor.  Asked to follow low salt diet.   3. Renal: Her creatinines have been stable.   4. DM; No  proteinuria.  On ARB.  hbg a1c stable.  Increase potassium intake and taper as potassium improves. ; no alkalosis or acidosis.  Started on Kcl by PCP. No evidence of hyperaldosteronism per labs.   Discussed PPI risks and only takes in prn.     Return in 9 months.

## 2020-02-13 ENCOUNTER — PATIENT OUTREACH (OUTPATIENT)
Dept: OTHER | Facility: OTHER | Age: 70
End: 2020-02-13

## 2020-02-13 DIAGNOSIS — N18.30 TYPE 2 DIABETES MELLITUS WITH STAGE 3 CHRONIC KIDNEY DISEASE, WITH LONG-TERM CURRENT USE OF INSULIN: ICD-10-CM

## 2020-02-13 DIAGNOSIS — N18.30 TYPE 2 DIABETES MELLITUS WITH STAGE 3 CHRONIC KIDNEY DISEASE, WITH LONG-TERM CURRENT USE OF INSULIN: Primary | ICD-10-CM

## 2020-02-13 DIAGNOSIS — Z79.4 TYPE 2 DIABETES MELLITUS WITH STAGE 3 CHRONIC KIDNEY DISEASE, WITH LONG-TERM CURRENT USE OF INSULIN: ICD-10-CM

## 2020-02-13 DIAGNOSIS — E11.22 TYPE 2 DIABETES MELLITUS WITH STAGE 3 CHRONIC KIDNEY DISEASE, WITH LONG-TERM CURRENT USE OF INSULIN: ICD-10-CM

## 2020-02-13 DIAGNOSIS — Z79.4 TYPE 2 DIABETES MELLITUS WITH STAGE 3 CHRONIC KIDNEY DISEASE, WITH LONG-TERM CURRENT USE OF INSULIN: Primary | ICD-10-CM

## 2020-02-13 DIAGNOSIS — E11.22 TYPE 2 DIABETES MELLITUS WITH STAGE 3 CHRONIC KIDNEY DISEASE, WITH LONG-TERM CURRENT USE OF INSULIN: Primary | ICD-10-CM

## 2020-02-13 RX ORDER — INSULIN DEGLUDEC 100 U/ML
33 INJECTION, SOLUTION SUBCUTANEOUS NIGHTLY
Qty: 15 SYRINGE | Refills: 4 | Status: SHIPPED | OUTPATIENT
Start: 2020-02-13 | End: 2020-08-10

## 2020-02-13 NOTE — PROGRESS NOTES
Digital Medicine: Clinician Follow-Up    Called patient to follow up with JOSEP and DDMP.     Hypertension:   Patient endorses adherence to medication regimen. Patient denies hypotensive s/sx (lightheadedness, dizziness, nausea, fatigue); patient denies hypertensive s/sx (SOB, CP, severe headaches, changes in vision). Instructed patient to seek medical care if BP > 180/110 and is accompanied by hypertensive s/sx associated, patient confirms understanding.     Patient has no complaints with HTN.       Diabetes:  Patient endorses adherence to medication regimen. Patient denies hypoglycemic s/sx (dizziness, extreme hunger, headaches, confusion, trouble concentrating, sweating, shaking, blurred vision, personality changes); patient denies hyperglycemic s/sx (increased thirst, increased urination, headaches, trouble concentrating, blurred vision, fatigue).    Patient has been having elevated fasting readings, and is open to increasing her long-acting insulin.       Patient denies having questions or concerns. Patient has my contact information and knows to call with any concerns or clinical changes.          The history is provided by the patient. No  was used.     Follow Up  Follow-up reason(s): medication change      Medication Change: dose increase        INTERVENTION(S)  reviewed appropriate dose schedule, reviewed monitoring technique, recommended med change, encouragement/support and goal setting    PLAN  patient verbalizes understanding, patient amenable to changes, additional monitoring needed and Health  follow up    Assessment:  HTN - Reviewed recent readings. Per 2017 ACC/ AHA HTN guidelines (goal of BP < 130/80), current 30-day average is well controlled.     DM - Reviewed recent readings. Per ADA guidelines (goal A1C < 7%), DM needs to be addressed more thoroughly today. Patient has elevated fasting readings, long-acting insulin should be increased.     Plan:  HTN - Continue current  medication regimen. I will continue to monitor regularly and will follow-up in 4 weeks, sooner if blood pressure begins to trend upward or downward.     DM - Increase Tresiba to 33 units QD, and continue all other DM medications. I will continue to monitor regularly and will follow-up in 4 weeks after lab results on 3/6, sooner if blood sugar begins to trend upward or downward.       There are no preventive care reminders to display for this patient.    Last 5 Patient Entered Readings                                      Current 30 Day Average: 129/62     Recent Readings 2/11/2020 2/8/2020 2/5/2020 2/3/2020 1/31/2020    SBP (mmHg) 136 129 135 126 137    DBP (mmHg) 63 62 63 61 58    Pulse 67 68 75 66 71        Last 6 Patient Entered Readings                                          Most Recent A1c: 7.5% on 11/8/2019  (Goal: 7%)     Recent Readings 2/13/2020 2/12/2020 2/12/2020 2/11/2020 2/11/2020    Blood Glucose (mg/dL) 187 267 152 186 168           Hypertension Medications             amLODIPine (NORVASC) 5 MG tablet Take 1 tablet (5 mg total) by mouth every evening.    chlorthalidone (HYGROTEN) 25 MG Tab Take 1 tablet (25 mg total) by mouth once daily.    valsartan (DIOVAN) 160 MG tablet Take 1 tablet (160 mg total) by mouth 2 (two) times daily.        Diabetes Medications             glipiZIDE (GLUCOTROL) 5 MG tablet Take 1 tablet (5 mg total) by mouth 2 (two) times daily before meals.    glucagon (human recombinant) inj 1mg/mL kit Inject 1 mL (1 mg total) into the muscle as needed.    insulin degludec (TRESIBA FLEXTOUCH U-100) 100 unit/mL (3 mL) InPn Inject 30 Units into the skin every evening.    liraglutide 0.6 mg/0.1 mL, 18 mg/3 mL, subq PNIJ (VICTOZA 3-TAWANA) 0.6 mg/0.1 mL (18 mg/3 mL) PnIj INJECT 1.8 MG INTO THE SKIN ONCE DAILY.    metFORMIN (GLUCOPHAGE) 1000 MG tablet Take 1 tablet (1,000 mg total) by mouth 2 (two) times daily with meals.               Screenings

## 2020-02-21 ENCOUNTER — CLINICAL SUPPORT (OUTPATIENT)
Dept: INTERNAL MEDICINE | Facility: CLINIC | Age: 70
End: 2020-02-21
Payer: MEDICARE

## 2020-02-21 DIAGNOSIS — Z51.6 ALLERGY DESENSITIZATION THERAPY: ICD-10-CM

## 2020-02-21 PROCEDURE — 99499 NO LOS: ICD-10-PCS | Mod: HCNC,S$GLB,, | Performed by: ALLERGY & IMMUNOLOGY

## 2020-02-21 PROCEDURE — 95115 IMMUNOTHERAPY ONE INJECTION: CPT | Mod: HCNC,S$GLB,, | Performed by: FAMILY MEDICINE

## 2020-02-21 PROCEDURE — 95115 PR IMMUNOTHERAPY, ONE INJECTION: ICD-10-PCS | Mod: HCNC,S$GLB,, | Performed by: FAMILY MEDICINE

## 2020-02-21 PROCEDURE — 99499 UNLISTED E&M SERVICE: CPT | Mod: HCNC,S$GLB,, | Performed by: ALLERGY & IMMUNOLOGY

## 2020-02-21 NOTE — PROGRESS NOTES
Pt received one allergy shot, c/dm/tr, 0.25ml, sub q UL of vial 1:1 (red). No reaction, waited in clinic 30 min.   Documented in xis, see media tab.

## 2020-02-28 ENCOUNTER — HOSPITAL ENCOUNTER (OUTPATIENT)
Dept: RADIOLOGY | Facility: HOSPITAL | Age: 70
Discharge: HOME | End: 2020-02-28
Attending: ORTHOPAEDIC SURGERY
Payer: MEDICARE

## 2020-02-28 ENCOUNTER — PES CALL (OUTPATIENT)
Dept: ADMINISTRATIVE | Facility: CLINIC | Age: 70
End: 2020-02-28

## 2020-02-28 DIAGNOSIS — Z96.651 HISTORY OF TOTAL RIGHT KNEE REPLACEMENT: ICD-10-CM

## 2020-02-28 DIAGNOSIS — M17.11 PRIMARY OSTEOARTHRITIS OF RIGHT KNEE: ICD-10-CM

## 2020-02-28 PROCEDURE — 73560 X-RAY EXAM OF KNEE 1 OR 2: CPT | Mod: 26,HCNC,RT, | Performed by: RADIOLOGY

## 2020-02-28 PROCEDURE — 73560 XR KNEE 1 OR 2 VIEW RIGHT: ICD-10-PCS | Mod: 26,HCNC,RT, | Performed by: RADIOLOGY

## 2020-02-28 PROCEDURE — 73560 X-RAY EXAM OF KNEE 1 OR 2: CPT | Mod: TC,HCNC,FY,RT

## 2020-03-03 ENCOUNTER — CLINICAL SUPPORT (OUTPATIENT)
Dept: INTERNAL MEDICINE | Facility: CLINIC | Age: 70
End: 2020-03-03
Payer: MEDICARE

## 2020-03-03 DIAGNOSIS — J30.1 SEASONAL ALLERGIC RHINITIS DUE TO POLLEN: ICD-10-CM

## 2020-03-03 PROCEDURE — 95115 IMMUNOTHERAPY ONE INJECTION: CPT | Mod: HCNC,S$GLB,, | Performed by: FAMILY MEDICINE

## 2020-03-03 PROCEDURE — 95115 PR IMMUNOTHERAPY, ONE INJECTION: ICD-10-PCS | Mod: HCNC,S$GLB,, | Performed by: FAMILY MEDICINE

## 2020-03-03 PROCEDURE — 99499 NO LOS: ICD-10-PCS | Mod: HCNC,S$GLB,, | Performed by: ALLERGY & IMMUNOLOGY

## 2020-03-03 PROCEDURE — 99499 UNLISTED E&M SERVICE: CPT | Mod: HCNC,S$GLB,, | Performed by: ALLERGY & IMMUNOLOGY

## 2020-03-04 ENCOUNTER — TELEPHONE (OUTPATIENT)
Dept: ORTHOPEDICS | Facility: CLINIC | Age: 70
End: 2020-03-04

## 2020-03-04 DIAGNOSIS — Z96.651 HISTORY OF TOTAL RIGHT KNEE REPLACEMENT: Primary | ICD-10-CM

## 2020-03-05 ENCOUNTER — TELEPHONE (OUTPATIENT)
Dept: ORTHOPEDICS | Facility: CLINIC | Age: 70
End: 2020-03-05

## 2020-03-05 NOTE — TELEPHONE ENCOUNTER
Spoke to patient. Informed her that xray does not need to be done at next visit. Xray was accidentally scheduled.

## 2020-03-05 NOTE — TELEPHONE ENCOUNTER
----- Message from Juanita Granado sent at 3/5/2020 12:33 PM CST -----  Pt is requesting a call back from the Nurse.    Pt has a question about a x-ray being double booked       Please call and advise        Thank you

## 2020-03-06 ENCOUNTER — OFFICE VISIT (OUTPATIENT)
Dept: ORTHOPEDICS | Facility: CLINIC | Age: 70
End: 2020-03-06
Payer: MEDICARE

## 2020-03-06 ENCOUNTER — LAB VISIT (OUTPATIENT)
Dept: LAB | Facility: HOSPITAL | Age: 70
End: 2020-03-06
Attending: NURSE PRACTITIONER
Payer: MEDICARE

## 2020-03-06 VITALS — HEIGHT: 63 IN | WEIGHT: 235 LBS | BODY MASS INDEX: 41.64 KG/M2

## 2020-03-06 DIAGNOSIS — E11.22 TYPE 2 DIABETES MELLITUS WITH STAGE 3 CHRONIC KIDNEY DISEASE, WITH LONG-TERM CURRENT USE OF INSULIN: ICD-10-CM

## 2020-03-06 DIAGNOSIS — M17.11 PRIMARY OSTEOARTHRITIS OF RIGHT KNEE: Primary | ICD-10-CM

## 2020-03-06 DIAGNOSIS — Z79.4 TYPE 2 DIABETES MELLITUS WITH STAGE 3 CHRONIC KIDNEY DISEASE, WITH LONG-TERM CURRENT USE OF INSULIN: ICD-10-CM

## 2020-03-06 DIAGNOSIS — N18.30 TYPE 2 DIABETES MELLITUS WITH STAGE 3 CHRONIC KIDNEY DISEASE, WITH LONG-TERM CURRENT USE OF INSULIN: ICD-10-CM

## 2020-03-06 DIAGNOSIS — Z96.651 HISTORY OF TOTAL RIGHT KNEE REPLACEMENT: ICD-10-CM

## 2020-03-06 LAB
ALBUMIN SERPL BCP-MCNC: 3.2 G/DL (ref 3.5–5.2)
ALP SERPL-CCNC: 88 U/L (ref 55–135)
ALT SERPL W/O P-5'-P-CCNC: 33 U/L (ref 10–44)
ANION GAP SERPL CALC-SCNC: 8 MMOL/L (ref 8–16)
AST SERPL-CCNC: 30 U/L (ref 10–40)
BILIRUB SERPL-MCNC: 0.4 MG/DL (ref 0.1–1)
BUN SERPL-MCNC: 16 MG/DL (ref 8–23)
CALCIUM SERPL-MCNC: 9 MG/DL (ref 8.7–10.5)
CHLORIDE SERPL-SCNC: 101 MMOL/L (ref 95–110)
CHOLEST SERPL-MCNC: 137 MG/DL (ref 120–199)
CHOLEST/HDLC SERPL: 2.7 {RATIO} (ref 2–5)
CO2 SERPL-SCNC: 31 MMOL/L (ref 23–29)
CREAT SERPL-MCNC: 0.8 MG/DL (ref 0.5–1.4)
EST. GFR  (AFRICAN AMERICAN): >60 ML/MIN/1.73 M^2
EST. GFR  (NON AFRICAN AMERICAN): >60 ML/MIN/1.73 M^2
ESTIMATED AVG GLUCOSE: 169 MG/DL (ref 68–131)
GLUCOSE SERPL-MCNC: 154 MG/DL (ref 70–110)
HBA1C MFR BLD HPLC: 7.5 % (ref 4–5.6)
HDLC SERPL-MCNC: 51 MG/DL (ref 40–75)
HDLC SERPL: 37.2 % (ref 20–50)
LDLC SERPL CALC-MCNC: 64.2 MG/DL (ref 63–159)
NONHDLC SERPL-MCNC: 86 MG/DL
POTASSIUM SERPL-SCNC: 3.6 MMOL/L (ref 3.5–5.1)
PROT SERPL-MCNC: 7.7 G/DL (ref 6–8.4)
SODIUM SERPL-SCNC: 140 MMOL/L (ref 136–145)
TRIGL SERPL-MCNC: 109 MG/DL (ref 30–150)

## 2020-03-06 PROCEDURE — 83036 HEMOGLOBIN GLYCOSYLATED A1C: CPT | Mod: HCNC

## 2020-03-06 PROCEDURE — 1101F PT FALLS ASSESS-DOCD LE1/YR: CPT | Mod: HCNC,CPTII,S$GLB, | Performed by: ORTHOPAEDIC SURGERY

## 2020-03-06 PROCEDURE — 99213 PR OFFICE/OUTPT VISIT, EST, LEVL III, 20-29 MIN: ICD-10-PCS | Mod: HCNC,S$GLB,, | Performed by: ORTHOPAEDIC SURGERY

## 2020-03-06 PROCEDURE — 80061 LIPID PANEL: CPT | Mod: HCNC

## 2020-03-06 PROCEDURE — 1125F PR PAIN SEVERITY QUANTIFIED, PAIN PRESENT: ICD-10-PCS | Mod: HCNC,S$GLB,, | Performed by: ORTHOPAEDIC SURGERY

## 2020-03-06 PROCEDURE — 36415 COLL VENOUS BLD VENIPUNCTURE: CPT | Mod: HCNC,PO

## 2020-03-06 PROCEDURE — 99999 PR PBB SHADOW E&M-EST. PATIENT-LVL III: CPT | Mod: PBBFAC,HCNC,, | Performed by: ORTHOPAEDIC SURGERY

## 2020-03-06 PROCEDURE — 1101F PR PT FALLS ASSESS DOC 0-1 FALLS W/OUT INJ PAST YR: ICD-10-PCS | Mod: HCNC,CPTII,S$GLB, | Performed by: ORTHOPAEDIC SURGERY

## 2020-03-06 PROCEDURE — 99999 PR PBB SHADOW E&M-EST. PATIENT-LVL III: ICD-10-PCS | Mod: PBBFAC,HCNC,, | Performed by: ORTHOPAEDIC SURGERY

## 2020-03-06 PROCEDURE — 99213 OFFICE O/P EST LOW 20 MIN: CPT | Mod: HCNC,S$GLB,, | Performed by: ORTHOPAEDIC SURGERY

## 2020-03-06 PROCEDURE — 80053 COMPREHEN METABOLIC PANEL: CPT | Mod: HCNC

## 2020-03-06 PROCEDURE — 1159F MED LIST DOCD IN RCRD: CPT | Mod: HCNC,S$GLB,, | Performed by: ORTHOPAEDIC SURGERY

## 2020-03-06 PROCEDURE — 1125F AMNT PAIN NOTED PAIN PRSNT: CPT | Mod: HCNC,S$GLB,, | Performed by: ORTHOPAEDIC SURGERY

## 2020-03-06 PROCEDURE — 1159F PR MEDICATION LIST DOCUMENTED IN MEDICAL RECORD: ICD-10-PCS | Mod: HCNC,S$GLB,, | Performed by: ORTHOPAEDIC SURGERY

## 2020-03-06 NOTE — PROGRESS NOTES
Subjective:      Patient ID: Vivienne Jose is a 69 y.o. female.    Chief Complaint: Follow-up of the Right Knee      HPI:  One year postop  The patient is seen for postop follow-up of right  TKA.  Pain control has been satisfactory  They feel that they are ambulating with difficulty  Preoperative complaints include:  None at this time      Current Outpatient Medications:     amLODIPine (NORVASC) 5 MG tablet, Take 1 tablet (5 mg total) by mouth every evening., Disp: 90 tablet, Rfl: 3    aspirin (ECOTRIN) 81 MG EC tablet, Take 81 mg by mouth once daily., Disp: , Rfl:     blood sugar diagnostic (TRUE METRIX GLUCOSE TEST STRIP) Strp, TEST TWO TIMES DAILY, Disp: 200 strip, Rfl: 6    blood-glucose meter Misc, [employer] True Metrix Air meter, Disp: 1 each, Rfl: 0    calcium carbonate-vitamin D3 600 mg(1,500mg) -100 unit Cap, Take 1 tablet by mouth once daily., Disp: , Rfl:     chlorthalidone (HYGROTEN) 25 MG Tab, Take 1 tablet (25 mg total) by mouth once daily., Disp: 90 tablet, Rfl: 3    cranberry 500 mg Cap, Take by mouth., Disp: , Rfl:     fluticasone propionate (FLONASE) 50 mcg/actuation nasal spray, USE 2 SPRAYS IN EACH NOSTRIL EVERY DAY, Disp: 48 g, Rfl: 4    gabapentin (NEURONTIN) 600 MG tablet, Take 1 tablet (600 mg total) by mouth 2 (two) times daily., Disp: 180 tablet, Rfl: 3    glipiZIDE (GLUCOTROL) 5 MG tablet, Take 1 tablet (5 mg total) by mouth 2 (two) times daily before meals., Disp: 180 tablet, Rfl: 3    insulin degludec (TRESIBA FLEXTOUCH U-100) 100 unit/mL (3 mL) InPn, Inject 33 Units into the skin every evening., Disp: 15 Syringe, Rfl: 4    ketotifen (ZADITOR) 0.025 % (0.035 %) ophthalmic solution, Place 1-2 drops into both eyes once daily., Disp: , Rfl:     L.acid-B.bifidum-B.animal-FOS (PROBIOTIC COMPLEX) 25 billion cell -100 mg Cap, Take 1 capsule by mouth once daily., Disp: , Rfl:     lancets (TRUEPLUS LANCETS) 28 gauge Misc, Inject 1 lancet into the skin 2 (two) times daily before meals.,  "Disp: 200 each, Rfl: 6    levothyroxine (SYNTHROID) 75 MCG tablet, Take 1 tablet (75 mcg total) by mouth once daily., Disp: 90 tablet, Rfl: 3    liraglutide 0.6 mg/0.1 mL, 18 mg/3 mL, subq PNIJ (VICTOZA 3-TAWANA) 0.6 mg/0.1 mL (18 mg/3 mL) PnIj, INJECT 1.8 MG INTO THE SKIN ONCE DAILY., Disp: 27 mL, Rfl: 11    loratadine (CLARITIN) 10 mg tablet, Take 10 mg by mouth once daily., Disp: , Rfl:     lovastatin (MEVACOR) 40 MG tablet, TAKE 1 TABLET NIGHTLY (SUBSTITUTED FOR MEVACOR), Disp: 90 tablet, Rfl: 3    magnesium oxide-Mg AA chelate (MAGNESIUM, AMINO ACID CHELATE,) 133 mg Tab, Take by mouth as directed. , Disp: , Rfl:     metFORMIN (GLUCOPHAGE) 1000 MG tablet, Take 1 tablet (1,000 mg total) by mouth 2 (two) times daily with meals., Disp: 180 tablet, Rfl: 3    mv-mn/folic acid/vit [id number] (ALIVE ONCE DAILY WOMEN 50 PLUS ORAL), Take 1 tablet by mouth once daily., Disp: , Rfl:     omeprazole (PRILOSEC) 20 MG capsule, Take 1 capsule (20 mg total) by mouth daily as needed., Disp: 90 capsule, Rfl: 1    pen needle, diabetic (BD ULTRA-FINE EDUARDO PEN NEEDLE) 32 gauge x 5/32" Ndle, USE AS DIRECTED, Disp: 200 each, Rfl: 6    potassium chloride SA (K-DUR,KLOR-CON) 10 MEQ tablet, TAKE 1 TABLET EVERY DAY  EXCEPT TAKE 2 TABLETS ON MONDAY, WEDNESDAY AND FRIDAY, Disp: 130 tablet, Rfl: 1    PREMARIN vaginal cream, Place 0.5 g vaginally 3 (three) times a week., Disp: 30 g, Rfl: 3    valACYclovir (VALTREX) 500 MG tablet, Take 1 tablet (500 mg total) by mouth once daily., Disp: 90 tablet, Rfl: 1    valsartan (DIOVAN) 160 MG tablet, Take 1 tablet (160 mg total) by mouth 2 (two) times daily., Disp: 180 tablet, Rfl: 3    azelastine (ASTELIN) 137 mcg (0.1 %) nasal spray, USE 2 SPRAYS NASALLY TWICE DAILY, Disp: 120 mL, Rfl: 11    glucagon (human recombinant) inj 1mg/mL kit, Inject 1 mL (1 mg total) into the muscle as needed., Disp: 1 kit, Rfl: 6  Review of patient's allergies indicates:   Allergen Reactions    Sulfa (sulfonamide " "antibiotics) Nausea Only    Ciprofloxacin     Januvia [sitagliptin]      abd pain    Lisinopril     Lotensin [benazepril]     Nexium [esomeprazole magnesium] Other (See Comments)     Gas       Ht 5' 3" (1.6 m)   Wt 106.6 kg (235 lb)   LMP  (LMP Unknown)   BMI 41.63 kg/m²     Review of Systems   Constitution: Negative for fever and weight loss.   HENT: Negative for congestion.    Eyes: Negative for visual disturbance.   Cardiovascular: Negative for chest pain.   Respiratory: Negative for shortness of breath.    Hematologic/Lymphatic: Negative for bleeding problem. Does not bruise/bleed easily.   Skin: Negative for poor wound healing.   Musculoskeletal: Positive for back pain.   Gastrointestinal: Negative for abdominal pain.   Genitourinary: Negative for dysuria.   Neurological: Negative for seizures.   Psychiatric/Behavioral: Negative for altered mental status.   Allergic/Immunologic: Negative for persistent infections.           Objective:    Ortho Exam          Alert, oriented, no acute distress  Sclera-Normal  Respiratory distress-none  Gait no limp  Incision:  Normally healed  Range of motion: extension- 0 degrees; flexion- 135 degrees  Valgus/varus stability- stable  Swelling-none  Distal perfusion-intact  Distal neurologic-intact    Imaging:  Recent radiographs show well-positioned well-fixed right total knee replacement without complicating process    Assessment:             1. Primary osteoarthritis of right knee    2. History of total right knee replacement        The total knee has recovered normally.        Plan:          Follow up if symptoms worsen or fail to improve.    Explained the natural history of knee replacement going forward.  Appropriate levels of activity discussed    We also briefly discussed some of the patient's upper extremity complaints.  I gave her information on her hand surgeon in order that she could contact him at her discretion.        "

## 2020-03-09 ENCOUNTER — CLINICAL SUPPORT (OUTPATIENT)
Dept: INTERNAL MEDICINE | Facility: CLINIC | Age: 70
End: 2020-03-09
Payer: MEDICARE

## 2020-03-09 DIAGNOSIS — J30.9 CHRONIC ALLERGIC RHINITIS: ICD-10-CM

## 2020-03-09 PROCEDURE — 99499 NO LOS: ICD-10-PCS | Mod: HCNC,S$GLB,, | Performed by: ALLERGY & IMMUNOLOGY

## 2020-03-09 PROCEDURE — 95115 PR IMMUNOTHERAPY, ONE INJECTION: ICD-10-PCS | Mod: HCNC,S$GLB,, | Performed by: FAMILY MEDICINE

## 2020-03-09 PROCEDURE — 99499 UNLISTED E&M SERVICE: CPT | Mod: HCNC,S$GLB,, | Performed by: ALLERGY & IMMUNOLOGY

## 2020-03-09 PROCEDURE — 95115 IMMUNOTHERAPY ONE INJECTION: CPT | Mod: HCNC,S$GLB,, | Performed by: FAMILY MEDICINE

## 2020-03-11 NOTE — PROGRESS NOTES
Subjective:      Patient ID: Vivienne Jose is a 69 y.o. female.    Chief Complaint:  Diabetes    History of Present Illness  Vivienne Jose is presenting today for DM type 2 & hypothyroidism follow up.     She is having a right knee replacement in March.     With regards to the diabetes:  Diagnosed with DM in her early 50s   She was told she had prediabetes in her 30s     Current regimen:  Tresiba 33u HS   Victoza 1.8 mg qd  Metformin 1000 mg BID  Glipizide 5 mg BID AC- she has been non compliant     Missed doses? No     2 times a day testing  Log reviewed: none  3/13/2020  8:09   181          3/13/2020 12:00 AM             3/12/2020 10:43         258    3/12/2020  7:35   170          3/12/2020 12:00 AM             3/11/2020  6:54        243     3/11/2020  7:02   232          3/11/2020 12:00 AM             3/10/2020  9:20         293    3/10/2020  7:46   172          3/10/2020 12:00 AM             3/9/2020  7:39         178    3/9/2020  7:53 AM   207          3/9/2020  7:53             3/9/2020 12:00 AM             3/8/2020  9:18         237    3/8/2020  9:04   166             Eats 3 meals a day.  Snacks rarely  Drinks water    Exercise - going to try to go to yoga      Hypoglycemic event? None   Corrects with juice   Has glucagon emergency kit     Known complications PN and nephropathy    Education - last visit: 05/2015     She consistently gets UTI's     Diabetes Management Status  Statin: Taking  ACE/ARB: Taking  Screening or Prevention Patient's value Goal Complete/Controlled?   HgA1C Testing and Control   Lab Results   Component Value Date    HGBA1C 7.5 (H) 03/06/2020    Annually/Less than 7.5% Yes   Lipid profile : 03/06/2020 Annually Yes   LDL control Lab Results   Component Value Date    LDLCALC 64.2 03/06/2020    Annually/Less than 100 mg/dl  Yes   Nephropathy screening Lab Results   Component Value Date    LABMICR 8.0 03/06/2020     Lab  Results   Component Value Date    PROTEINUA Negative 01/27/2020    Annually Yes   Blood pressure BP Readings from Last 1 Encounters:   03/13/20 130/72    Less than 140/90 Yes   Dilated retinal exam : 05/10/2019 Annually Yes   Foot exam   : 11/08/2019 Annually Yes     With regards to her hypothyroidism:  In 1978 she had right lobectomy for goiter and multiple cysts     Current medication:  generic lt4 75 mcg      Ref. Range 11/8/2019 08:34   TSH Latest Ref Range: 0.400 - 4.000 uIU/mL 1.867     takes thyroid medication properly without food first thing in the morning.     current symptoms:   Denies weight gain  Denies Fatigue   Denies Constipation   Denies Hair loss  Denies Brittle nails  Denies Mental fog     Last BMD dated 01/5/2018  Impression    Borderline osteopenia of the lumbar spine (slightly improved from prior exam) and osteopenia of the femoral necks (slightly decreased from prior exam).      Ref. Range 7/27/2018 07:24   Vit D, 25-Hydroxy Latest Ref Range: 30 - 96 ng/mL 41     Has BAM using cpap  Last thyroid US in 2018  FINDINGS:  History of thyroidectomy.    Right thyroid lobe measures 2.6 x 1.7 x 1.2 cm.  Parenchyma is homogeneous.  No microcalcifications.    Left thyroid lobe measures 3.4 x 1.3 x 1.5 cm.  Hypoechoic nodule measuring 1.1 cm and subcentimeter cystic nodule noted.    Isthmus measures 0.3 cm. Parenchyma is homogeneous.  No microcalcifications.    No evidence for cervical lymphadenopathy.      Impression     Residual thyroid, status post thyroidectomy.  Left thyroid nodule not meeting criteria for fine-needle aspiration.     Review of Systems   Constitutional: Negative for unexpected weight change.   Eyes: Negative for visual disturbance.   Respiratory: Negative for shortness of breath.    Cardiovascular: Negative for chest pain.   Gastrointestinal: Negative for abdominal pain.   Endocrine: Negative for cold intolerance, heat intolerance, polydipsia, polyphagia and polyuria.    Musculoskeletal: Negative for arthralgias and gait problem.   Skin: Negative for wound.   Neurological: Negative for headaches.   Hematological: Does not bruise/bleed easily.   Psychiatric/Behavioral: Negative for sleep disturbance.     Objective:   Physical Exam   Neck: No thyromegaly present.   Cardiovascular: Normal rate.   No edema present   Pulmonary/Chest: Effort normal.   Abdominal: Soft.   Vitals reviewed.  Foot exam deferred done 11/2019  injection sites are ok. No lipo hypertropthy or atrophy    Body mass index is 44.66 kg/m².    Lab Review:   Lab Results   Component Value Date    HGBA1C 7.5 (H) 03/06/2020     Lab Results   Component Value Date    CHOL 137 03/06/2020    HDL 51 03/06/2020    LDLCALC 64.2 03/06/2020    TRIG 109 03/06/2020    CHOLHDL 37.2 03/06/2020     Lab Results   Component Value Date     03/06/2020    K 3.6 03/06/2020     03/06/2020    CO2 31 (H) 03/06/2020     (H) 03/06/2020    BUN 16 03/06/2020    CREATININE 0.8 03/06/2020    CALCIUM 9.0 03/06/2020    PROT 7.7 03/06/2020    ALBUMIN 3.2 (L) 03/06/2020    BILITOT 0.4 03/06/2020    ALKPHOS 88 03/06/2020    AST 30 03/06/2020    ALT 33 03/06/2020    ANIONGAP 8 03/06/2020    ESTGFRAFRICA >60.0 03/06/2020    EGFRNONAA >60.0 03/06/2020    TSH 1.867 11/08/2019     Assessment and Plan     1. Type 2 diabetes mellitus with stage 3 chronic kidney disease, with long-term current use of insulin  Hemoglobin A1c    Comprehensive metabolic panel    Microalbumin/creatinine urine ratio    Lipid panel    semaglutide (OZEMPIC) 0.25 mg or 0.5 mg(2 mg/1.5 mL) PnIj   2. Urinary tract infection without hematuria, site unspecified  amoxicillin-clavulanate 875-125mg (AUGMENTIN) 875-125 mg per tablet   3. Diabetic polyneuropathy associated with type 2 diabetes mellitus     4. BAM (obstructive sleep apnea)     5. Osteopenia, unspecified location     6. Essential hypertension     7. Mixed hyperlipidemia     8. Morbid obesity with body mass index  (BMI) of 40.0 to 44.9 in adult     9. Vitamin D deficiency     10. Hypothyroidism, unspecified type  TSH      Diabetic polyneuropathy associated with type 2 diabetes mellitus  -- Continue gabapentin and following with podiatry.  -- Educated patient on proper comfortable foot wear, to always wear dry socks, never walk barefoot, never cut toenails too short, never test bath water with feet, encouraged patient to inspect feet daily for wounds., and if patient applies lotion to this feet to always go around the foot and not in between toes.     Type 2 diabetes mellitus with stage 3 chronic kidney disease, with long-term current use of insulin  - A1c goal 7-7.5%, dietary indiscretions is her issue   -Reviewed goals of therapy are to get the best control we can without hypoglycemia  - Discussed diet and exercise at length today   Medication changes:   Continue: Metformin & Glipizide 5 mg BID AC   Start tresiba 38u HS (weight based dose 44u daily) & Ozempic 0.25 mg weekly for 4 weeks & then 0.5 mg weekly thereafter. Discussed MOA & SE.   Stop victoza   - Reviewed patient's current insulin regimen. Clarified proper insulin dose and timing in relation to meals, etc. Insulin injection sites and proper rotation instructed.     -Advised frequent self blood glucose monitoring.  Patient encouraged to document glucose results and bring them to every clinic visit    -Hypoglycemia precautions discussed. Instructed on precautions before driving.    -Close adherence to lifestyle changes recommended.   -Periodic follow ups for eye evaluations, foot care and dental care suggested.    Post-surgical hypothyroidism  - Clinically and biochemically euthyroid  - Continue lt4 75 mcg daily   - Goal is a normal TSH  - Check TFTs and adjust dosage accordingly   - Avoid exogenous hyperthyroidism as this can accelerate bone loss and increase risk of CV complications.  - Advised to take LT4 on an empty stomach with water and to wait 30-45 minutes  before eating or taking other medications   - Reviewed that symptoms of hypothyroidism may not correlate with tsh, and a normal TSH is the goal of therapy.....  symptoms are not a justification for over treatment     BAM on CPAP  - Continue CPAP use     Osteopenia  -- borderline osteopenic  -- continue Ca & vit D supplement   -- next BMD due now, will order     Hypertension associated with diabetes  - Tolerating ARB  - Controlled     Dyslipidemia associated with type 2 diabetes mellitus  - Controlled   -  statin per ADA recommendations    Morbid obesity with BMI of 40.0-44.9, adult  -- encouraged dietary and lifestyle modifications   -- emphasized weight loss goals     Vitamin D deficiency  - continue over the counter 2000 IU a day      Follow up in about 4 months (around 7/13/2020).  Labs prior to next appointment with me

## 2020-03-13 ENCOUNTER — OFFICE VISIT (OUTPATIENT)
Dept: ENDOCRINOLOGY | Facility: CLINIC | Age: 70
End: 2020-03-13
Payer: MEDICARE

## 2020-03-13 VITALS
DIASTOLIC BLOOD PRESSURE: 72 MMHG | TEMPERATURE: 98 F | HEART RATE: 80 BPM | SYSTOLIC BLOOD PRESSURE: 130 MMHG | BODY MASS INDEX: 44.93 KG/M2 | HEIGHT: 62 IN | WEIGHT: 244.19 LBS

## 2020-03-13 DIAGNOSIS — M85.80 OSTEOPENIA, UNSPECIFIED LOCATION: ICD-10-CM

## 2020-03-13 DIAGNOSIS — N39.0 URINARY TRACT INFECTION WITHOUT HEMATURIA, SITE UNSPECIFIED: ICD-10-CM

## 2020-03-13 DIAGNOSIS — E55.9 VITAMIN D DEFICIENCY: ICD-10-CM

## 2020-03-13 DIAGNOSIS — E11.22 TYPE 2 DIABETES MELLITUS WITH STAGE 3 CHRONIC KIDNEY DISEASE, WITH LONG-TERM CURRENT USE OF INSULIN: ICD-10-CM

## 2020-03-13 DIAGNOSIS — N18.30 TYPE 2 DIABETES MELLITUS WITH STAGE 3 CHRONIC KIDNEY DISEASE, WITH LONG-TERM CURRENT USE OF INSULIN: ICD-10-CM

## 2020-03-13 DIAGNOSIS — E11.42 DIABETIC POLYNEUROPATHY ASSOCIATED WITH TYPE 2 DIABETES MELLITUS: ICD-10-CM

## 2020-03-13 DIAGNOSIS — E11.22 TYPE 2 DIABETES MELLITUS WITH STAGE 3 CHRONIC KIDNEY DISEASE, WITH LONG-TERM CURRENT USE OF INSULIN: Primary | ICD-10-CM

## 2020-03-13 DIAGNOSIS — E78.2 MIXED HYPERLIPIDEMIA: ICD-10-CM

## 2020-03-13 DIAGNOSIS — Z78.0 ASYMPTOMATIC MENOPAUSAL STATE: ICD-10-CM

## 2020-03-13 DIAGNOSIS — I10 ESSENTIAL HYPERTENSION: ICD-10-CM

## 2020-03-13 DIAGNOSIS — E66.01 MORBID OBESITY WITH BODY MASS INDEX (BMI) OF 40.0 TO 44.9 IN ADULT: ICD-10-CM

## 2020-03-13 DIAGNOSIS — Z79.4 TYPE 2 DIABETES MELLITUS WITH STAGE 3 CHRONIC KIDNEY DISEASE, WITH LONG-TERM CURRENT USE OF INSULIN: ICD-10-CM

## 2020-03-13 DIAGNOSIS — G47.33 OSA (OBSTRUCTIVE SLEEP APNEA): ICD-10-CM

## 2020-03-13 DIAGNOSIS — E03.9 HYPOTHYROIDISM, UNSPECIFIED TYPE: ICD-10-CM

## 2020-03-13 DIAGNOSIS — Z79.4 TYPE 2 DIABETES MELLITUS WITH STAGE 3 CHRONIC KIDNEY DISEASE, WITH LONG-TERM CURRENT USE OF INSULIN: Primary | ICD-10-CM

## 2020-03-13 DIAGNOSIS — N18.30 TYPE 2 DIABETES MELLITUS WITH STAGE 3 CHRONIC KIDNEY DISEASE, WITH LONG-TERM CURRENT USE OF INSULIN: Primary | ICD-10-CM

## 2020-03-13 PROCEDURE — 3051F PR MOST RECENT HEMOGLOBIN A1C LEVEL 7.0 - < 8.0%: ICD-10-PCS | Mod: HCNC,CPTII,S$GLB, | Performed by: NURSE PRACTITIONER

## 2020-03-13 PROCEDURE — 1101F PR PT FALLS ASSESS DOC 0-1 FALLS W/OUT INJ PAST YR: ICD-10-PCS | Mod: HCNC,CPTII,S$GLB, | Performed by: NURSE PRACTITIONER

## 2020-03-13 PROCEDURE — 1101F PT FALLS ASSESS-DOCD LE1/YR: CPT | Mod: HCNC,CPTII,S$GLB, | Performed by: NURSE PRACTITIONER

## 2020-03-13 PROCEDURE — 99214 OFFICE O/P EST MOD 30 MIN: CPT | Mod: HCNC,S$GLB,, | Performed by: NURSE PRACTITIONER

## 2020-03-13 PROCEDURE — 99214 PR OFFICE/OUTPT VISIT, EST, LEVL IV, 30-39 MIN: ICD-10-PCS | Mod: HCNC,S$GLB,, | Performed by: NURSE PRACTITIONER

## 2020-03-13 PROCEDURE — 3075F PR MOST RECENT SYSTOLIC BLOOD PRESS GE 130-139MM HG: ICD-10-PCS | Mod: HCNC,CPTII,S$GLB, | Performed by: NURSE PRACTITIONER

## 2020-03-13 PROCEDURE — 1126F PR PAIN SEVERITY QUANTIFIED, NO PAIN PRESENT: ICD-10-PCS | Mod: HCNC,S$GLB,, | Performed by: NURSE PRACTITIONER

## 2020-03-13 PROCEDURE — 3078F DIAST BP <80 MM HG: CPT | Mod: HCNC,CPTII,S$GLB, | Performed by: NURSE PRACTITIONER

## 2020-03-13 PROCEDURE — 1126F AMNT PAIN NOTED NONE PRSNT: CPT | Mod: HCNC,S$GLB,, | Performed by: NURSE PRACTITIONER

## 2020-03-13 PROCEDURE — 99999 PR PBB SHADOW E&M-EST. PATIENT-LVL III: CPT | Mod: PBBFAC,HCNC,, | Performed by: NURSE PRACTITIONER

## 2020-03-13 PROCEDURE — 1159F MED LIST DOCD IN RCRD: CPT | Mod: HCNC,S$GLB,, | Performed by: NURSE PRACTITIONER

## 2020-03-13 PROCEDURE — 3078F PR MOST RECENT DIASTOLIC BLOOD PRESSURE < 80 MM HG: ICD-10-PCS | Mod: HCNC,CPTII,S$GLB, | Performed by: NURSE PRACTITIONER

## 2020-03-13 PROCEDURE — 99999 PR PBB SHADOW E&M-EST. PATIENT-LVL III: ICD-10-PCS | Mod: PBBFAC,HCNC,, | Performed by: NURSE PRACTITIONER

## 2020-03-13 PROCEDURE — 3075F SYST BP GE 130 - 139MM HG: CPT | Mod: HCNC,CPTII,S$GLB, | Performed by: NURSE PRACTITIONER

## 2020-03-13 PROCEDURE — 1159F PR MEDICATION LIST DOCUMENTED IN MEDICAL RECORD: ICD-10-PCS | Mod: HCNC,S$GLB,, | Performed by: NURSE PRACTITIONER

## 2020-03-13 PROCEDURE — 3051F HG A1C>EQUAL 7.0%<8.0%: CPT | Mod: HCNC,CPTII,S$GLB, | Performed by: NURSE PRACTITIONER

## 2020-03-13 RX ORDER — AMOXICILLIN AND CLAVULANATE POTASSIUM 875; 125 MG/1; MG/1
1 TABLET, FILM COATED ORAL 2 TIMES DAILY
Qty: 10 TABLET | Refills: 0 | Status: SHIPPED | OUTPATIENT
Start: 2020-03-13 | End: 2020-06-26 | Stop reason: ALTCHOICE

## 2020-03-17 NOTE — PROGRESS NOTES
03/17/2020 12:40 PM   Patient is currently at goal, Avg BP is 134/63. Defer call at this time. Patient recently saw Endo within the past week and Victoza was changed to Ozempic, as well as Tresiba increased to 38 units. Will reach out in 6 weeks, sooner if BP begins to trend upward or downward.

## 2020-03-18 ENCOUNTER — CLINICAL SUPPORT (OUTPATIENT)
Dept: INTERNAL MEDICINE | Facility: CLINIC | Age: 70
End: 2020-03-18
Payer: MEDICARE

## 2020-03-18 DIAGNOSIS — J30.1 SEASONAL ALLERGIC RHINITIS DUE TO POLLEN: ICD-10-CM

## 2020-03-18 DIAGNOSIS — J30.9 CHRONIC ALLERGIC RHINITIS: ICD-10-CM

## 2020-03-18 PROCEDURE — 95115 IMMUNOTHERAPY ONE INJECTION: CPT | Mod: HCNC,S$GLB,, | Performed by: FAMILY MEDICINE

## 2020-03-18 PROCEDURE — 95115 PR IMMUNOTHERAPY, ONE INJECTION: ICD-10-PCS | Mod: HCNC,S$GLB,, | Performed by: FAMILY MEDICINE

## 2020-03-18 PROCEDURE — 99499 NO LOS: ICD-10-PCS | Mod: HCNC,S$GLB,, | Performed by: ALLERGY & IMMUNOLOGY

## 2020-03-18 PROCEDURE — 99499 UNLISTED E&M SERVICE: CPT | Mod: HCNC,S$GLB,, | Performed by: ALLERGY & IMMUNOLOGY

## 2020-03-20 DIAGNOSIS — Z79.4 TYPE 2 DIABETES MELLITUS WITH STAGE 3 CHRONIC KIDNEY DISEASE, WITH LONG-TERM CURRENT USE OF INSULIN: Primary | ICD-10-CM

## 2020-03-20 DIAGNOSIS — N18.30 TYPE 2 DIABETES MELLITUS WITH STAGE 3 CHRONIC KIDNEY DISEASE, WITH LONG-TERM CURRENT USE OF INSULIN: Primary | ICD-10-CM

## 2020-03-20 DIAGNOSIS — E11.22 TYPE 2 DIABETES MELLITUS WITH STAGE 3 CHRONIC KIDNEY DISEASE, WITH LONG-TERM CURRENT USE OF INSULIN: Primary | ICD-10-CM

## 2020-03-23 ENCOUNTER — CLINICAL SUPPORT (OUTPATIENT)
Dept: INTERNAL MEDICINE | Facility: CLINIC | Age: 70
End: 2020-03-23
Payer: MEDICARE

## 2020-03-23 DIAGNOSIS — J30.9 CHRONIC ALLERGIC RHINITIS: ICD-10-CM

## 2020-03-23 PROCEDURE — 95115 PR IMMUNOTHERAPY, ONE INJECTION: ICD-10-PCS | Mod: HCNC,S$GLB,, | Performed by: FAMILY MEDICINE

## 2020-03-23 PROCEDURE — 99499 NO LOS: ICD-10-PCS | Mod: HCNC,S$GLB,, | Performed by: ALLERGY & IMMUNOLOGY

## 2020-03-23 PROCEDURE — 95115 IMMUNOTHERAPY ONE INJECTION: CPT | Mod: HCNC,S$GLB,, | Performed by: FAMILY MEDICINE

## 2020-03-23 PROCEDURE — 99499 UNLISTED E&M SERVICE: CPT | Mod: HCNC,S$GLB,, | Performed by: ALLERGY & IMMUNOLOGY

## 2020-03-23 RX ORDER — CONJUGATED ESTROGENS 0.62 MG/G
0.5 CREAM VAGINAL
Qty: 30 G | Refills: 0 | Status: SHIPPED | OUTPATIENT
Start: 2020-03-23 | End: 2021-02-26

## 2020-03-23 NOTE — TELEPHONE ENCOUNTER
Last seen in Jan 2019, however, we are in the midst of a COVID19 shelter in place order.  1 refill sent.

## 2020-03-30 ENCOUNTER — CLINICAL SUPPORT (OUTPATIENT)
Dept: INTERNAL MEDICINE | Facility: CLINIC | Age: 70
End: 2020-03-30
Payer: MEDICARE

## 2020-03-30 ENCOUNTER — PATIENT MESSAGE (OUTPATIENT)
Dept: OTHER | Facility: OTHER | Age: 70
End: 2020-03-30

## 2020-03-30 DIAGNOSIS — J30.9 CHRONIC ALLERGIC RHINITIS: ICD-10-CM

## 2020-03-30 PROCEDURE — 99499 UNLISTED E&M SERVICE: CPT | Mod: HCNC,S$GLB,, | Performed by: ALLERGY & IMMUNOLOGY

## 2020-03-30 PROCEDURE — 95115 PR IMMUNOTHERAPY, ONE INJECTION: ICD-10-PCS | Mod: HCNC,S$GLB,, | Performed by: FAMILY MEDICINE

## 2020-03-30 PROCEDURE — 99499 NO LOS: ICD-10-PCS | Mod: HCNC,S$GLB,, | Performed by: ALLERGY & IMMUNOLOGY

## 2020-03-30 PROCEDURE — 95115 IMMUNOTHERAPY ONE INJECTION: CPT | Mod: HCNC,S$GLB,, | Performed by: FAMILY MEDICINE

## 2020-03-30 NOTE — PROGRESS NOTES
03/30/2020 5:01 PM   CRM placed, msg received from patient and she needs a new cuff mailed from [facility]

## 2020-03-31 ENCOUNTER — PATIENT OUTREACH (OUTPATIENT)
Dept: OTHER | Facility: OTHER | Age: 70
End: 2020-03-31

## 2020-03-31 NOTE — PROGRESS NOTES
"Digital Medicine: Health  Follow-Up    The history is provided by the patient.     Follow Up  Follow-up reason(s): goal follow-up    Mrs. Jose is doing okay. Patient reports that she stopped working last week, and has only been going out for grocery/food  and her allergy shot. She still visit the clinics for these, but is being extra cautious. Patient expressed concern about her kids potentially being exposed. She has three daughters, one works in office and two are remote. However, their husbands are out every day.     Patient commented on BP being "up and down". She states that she has been trying to eat the right things, and is finding small projects to do around the house to keep herself busy, although she said she still spends a lot of time in sitting in from of the computer.    Mrs. Jose will have a gap in BP monitoring. She reports that her BP cuff will not inflate. She spoke with clinician, Jagjit, yesterday and a CRM was placed for [facility] to ship her a new device.    Support provided. Patient thanked me for calling today and denies questions at this time.              INTERVENTION(S)  encouragement/support and denied questions    PLAN  continue monitoring      There are no preventive care reminders to display for this patient.    Last 5 Patient Entered Readings                                      Current 30 Day Average: 135/63     Recent Readings 3/27/2020 3/27/2020 3/27/2020 3/25/2020 3/25/2020    SBP (mmHg) 145 137 140 118 130    DBP (mmHg) 64 66 66 60 65    Pulse 68 69 68 61 63        Last 6 Patient Entered Readings                                          Most Recent A1c: 7.5% on 3/6/2020  (Goal: 7%)     Recent Readings 3/31/2020 3/30/2020 3/30/2020 3/29/2020 3/29/2020    Blood Glucose (mg/dL) 123 186 135 165 197               Diet Screening       Patient reports that she and her  have been eating out a lot less since restaurants are take out only. She has been doing the grocery " pickup to limit contact with others. She is trying to eat healthier foods. Encouragement provided.     Physical Activity Screening   When asked if exercising, patient responded: no    Mrs. Jose is not exercising, but is trying to find new projects to do around her home to keep herself busy. She is trying to go outside at least once a day.

## 2020-04-11 ENCOUNTER — PATIENT MESSAGE (OUTPATIENT)
Dept: ADMINISTRATIVE | Facility: OTHER | Age: 70
End: 2020-04-11

## 2020-04-20 ENCOUNTER — CLINICAL SUPPORT (OUTPATIENT)
Dept: INTERNAL MEDICINE | Facility: CLINIC | Age: 70
End: 2020-04-20
Payer: MEDICARE

## 2020-04-20 DIAGNOSIS — J30.1 SEASONAL ALLERGIC RHINITIS DUE TO POLLEN: ICD-10-CM

## 2020-04-20 PROCEDURE — 95115 IMMUNOTHERAPY ONE INJECTION: CPT | Mod: HCNC,S$GLB,, | Performed by: FAMILY MEDICINE

## 2020-04-20 PROCEDURE — 99499 NO LOS: ICD-10-PCS | Mod: HCNC,S$GLB,, | Performed by: ALLERGY & IMMUNOLOGY

## 2020-04-20 PROCEDURE — 95115 PR IMMUNOTHERAPY, ONE INJECTION: ICD-10-PCS | Mod: HCNC,S$GLB,, | Performed by: FAMILY MEDICINE

## 2020-04-20 PROCEDURE — 99499 UNLISTED E&M SERVICE: CPT | Mod: HCNC,S$GLB,, | Performed by: ALLERGY & IMMUNOLOGY

## 2020-04-22 ENCOUNTER — PATIENT MESSAGE (OUTPATIENT)
Dept: INTERNAL MEDICINE | Facility: CLINIC | Age: 70
End: 2020-04-22

## 2020-04-22 DIAGNOSIS — B00.9 RECURRENT HSV (HERPES SIMPLEX VIRUS): ICD-10-CM

## 2020-04-22 RX ORDER — VALACYCLOVIR HYDROCHLORIDE 500 MG/1
500 TABLET, FILM COATED ORAL DAILY
Qty: 90 TABLET | Refills: 3 | Status: SHIPPED | OUTPATIENT
Start: 2020-04-22 | End: 2021-03-29

## 2020-04-24 DIAGNOSIS — B00.9 RECURRENT HSV (HERPES SIMPLEX VIRUS): ICD-10-CM

## 2020-05-11 ENCOUNTER — CLINICAL SUPPORT (OUTPATIENT)
Dept: INTERNAL MEDICINE | Facility: CLINIC | Age: 70
End: 2020-05-11
Payer: MEDICARE

## 2020-05-11 DIAGNOSIS — J30.9 CHRONIC ALLERGIC RHINITIS: ICD-10-CM

## 2020-05-11 PROCEDURE — 95115 PR IMMUNOTHERAPY, ONE INJECTION: ICD-10-PCS | Mod: HCNC,S$GLB,, | Performed by: FAMILY MEDICINE

## 2020-05-11 PROCEDURE — 95115 IMMUNOTHERAPY ONE INJECTION: CPT | Mod: HCNC,S$GLB,, | Performed by: FAMILY MEDICINE

## 2020-05-11 PROCEDURE — 99499 NO LOS: ICD-10-PCS | Mod: HCNC,S$GLB,, | Performed by: ALLERGY & IMMUNOLOGY

## 2020-05-11 PROCEDURE — 99499 UNLISTED E&M SERVICE: CPT | Mod: HCNC,S$GLB,, | Performed by: ALLERGY & IMMUNOLOGY

## 2020-05-13 ENCOUNTER — PATIENT MESSAGE (OUTPATIENT)
Dept: UROLOGY | Facility: CLINIC | Age: 70
End: 2020-05-13

## 2020-05-14 ENCOUNTER — PATIENT OUTREACH (OUTPATIENT)
Dept: OTHER | Facility: OTHER | Age: 70
End: 2020-05-14

## 2020-05-14 ENCOUNTER — OFFICE VISIT (OUTPATIENT)
Dept: UROLOGY | Facility: CLINIC | Age: 70
End: 2020-05-14
Payer: MEDICARE

## 2020-05-14 ENCOUNTER — PATIENT OUTREACH (OUTPATIENT)
Dept: ADMINISTRATIVE | Facility: OTHER | Age: 70
End: 2020-05-14

## 2020-05-14 VITALS
HEART RATE: 71 BPM | DIASTOLIC BLOOD PRESSURE: 66 MMHG | HEIGHT: 62 IN | WEIGHT: 243.38 LBS | BODY MASS INDEX: 44.79 KG/M2 | SYSTOLIC BLOOD PRESSURE: 133 MMHG

## 2020-05-14 DIAGNOSIS — N30.90 BLADDER INFECTION: Primary | ICD-10-CM

## 2020-05-14 DIAGNOSIS — N95.2 VAGINAL ATROPHY: ICD-10-CM

## 2020-05-14 PROCEDURE — 99213 OFFICE O/P EST LOW 20 MIN: CPT | Mod: 25,HCNC,S$GLB, | Performed by: UROLOGY

## 2020-05-14 PROCEDURE — 3075F PR MOST RECENT SYSTOLIC BLOOD PRESS GE 130-139MM HG: ICD-10-PCS | Mod: HCNC,CPTII,S$GLB, | Performed by: UROLOGY

## 2020-05-14 PROCEDURE — 3078F DIAST BP <80 MM HG: CPT | Mod: HCNC,CPTII,S$GLB, | Performed by: UROLOGY

## 2020-05-14 PROCEDURE — 87088 URINE BACTERIA CULTURE: CPT | Mod: HCNC

## 2020-05-14 PROCEDURE — 87186 SC STD MICRODIL/AGAR DIL: CPT | Mod: HCNC

## 2020-05-14 PROCEDURE — 1125F PR PAIN SEVERITY QUANTIFIED, PAIN PRESENT: ICD-10-PCS | Mod: HCNC,S$GLB,, | Performed by: UROLOGY

## 2020-05-14 PROCEDURE — 87077 CULTURE AEROBIC IDENTIFY: CPT | Mod: HCNC

## 2020-05-14 PROCEDURE — 3075F SYST BP GE 130 - 139MM HG: CPT | Mod: HCNC,CPTII,S$GLB, | Performed by: UROLOGY

## 2020-05-14 PROCEDURE — 1159F MED LIST DOCD IN RCRD: CPT | Mod: HCNC,S$GLB,, | Performed by: UROLOGY

## 2020-05-14 PROCEDURE — 1159F PR MEDICATION LIST DOCUMENTED IN MEDICAL RECORD: ICD-10-PCS | Mod: HCNC,S$GLB,, | Performed by: UROLOGY

## 2020-05-14 PROCEDURE — 81002 URINALYSIS NONAUTO W/O SCOPE: CPT | Mod: HCNC,S$GLB,, | Performed by: UROLOGY

## 2020-05-14 PROCEDURE — 1101F PT FALLS ASSESS-DOCD LE1/YR: CPT | Mod: HCNC,CPTII,S$GLB, | Performed by: UROLOGY

## 2020-05-14 PROCEDURE — 1101F PR PT FALLS ASSESS DOC 0-1 FALLS W/OUT INJ PAST YR: ICD-10-PCS | Mod: HCNC,CPTII,S$GLB, | Performed by: UROLOGY

## 2020-05-14 PROCEDURE — 51701 INSERT BLADDER CATHETER: CPT | Mod: HCNC,S$GLB,, | Performed by: UROLOGY

## 2020-05-14 PROCEDURE — 99213 PR OFFICE/OUTPT VISIT, EST, LEVL III, 20-29 MIN: ICD-10-PCS | Mod: 25,HCNC,S$GLB, | Performed by: UROLOGY

## 2020-05-14 PROCEDURE — 51701 PR INSERTION OF NON-INDWELLING BLADDER CATHETERIZATION FOR RESIDUAL UR: ICD-10-PCS | Mod: HCNC,S$GLB,, | Performed by: UROLOGY

## 2020-05-14 PROCEDURE — 3078F PR MOST RECENT DIASTOLIC BLOOD PRESSURE < 80 MM HG: ICD-10-PCS | Mod: HCNC,CPTII,S$GLB, | Performed by: UROLOGY

## 2020-05-14 PROCEDURE — 87086 URINE CULTURE/COLONY COUNT: CPT | Mod: HCNC

## 2020-05-14 PROCEDURE — 99999 PR PBB SHADOW E&M-EST. PATIENT-LVL V: CPT | Mod: PBBFAC,HCNC,, | Performed by: UROLOGY

## 2020-05-14 PROCEDURE — 81002 PR URINALYSIS NONAUTO W/O SCOPE: ICD-10-PCS | Mod: HCNC,S$GLB,, | Performed by: UROLOGY

## 2020-05-14 PROCEDURE — 1125F AMNT PAIN NOTED PAIN PRSNT: CPT | Mod: HCNC,S$GLB,, | Performed by: UROLOGY

## 2020-05-14 PROCEDURE — 99999 PR PBB SHADOW E&M-EST. PATIENT-LVL V: ICD-10-PCS | Mod: PBBFAC,HCNC,, | Performed by: UROLOGY

## 2020-05-14 RX ORDER — DOXYCYCLINE 100 MG/1
100 CAPSULE ORAL 2 TIMES DAILY
Qty: 14 CAPSULE | Refills: 0 | Status: SHIPPED | OUTPATIENT
Start: 2020-05-14 | End: 2020-05-21

## 2020-05-14 NOTE — PROGRESS NOTES
CHIEF COMPLAINT:    Mrs. Jose is a 69 y.o. female presenting for an urgent visit for possible UTI    PRESENTING ILLNESS:    Vivienne Jose is a 69 y.o. female who returns for follow up.  She has a lifetime history of recurrent UTI (since age 3)  She states that her last UTI was in March 2020, no culture was done but she was empirically treated by Charity Robledo NP in endocrinology.  When she was last seen she had been getting UTI's monthly, so now it is every other month, which is an improvement.  She states that her bowels are normal.  She continues taking or using Ellura, Premarin vaginal cream, probiotic. Has been tried on methenamine in the past but discontinued it due to ineffectiveness.  She does have a history of diabetes mellitus most recent HgA1c was 7.5 on 3/6/2020.      She has slight nausea and dysuria, urinary urgency.    REVIEW OF SYSTEMS:    Review of Systems   Constitutional: Negative.    HENT: Negative.    Eyes: Negative.    Respiratory: Negative.    Cardiovascular: Negative.    Gastrointestinal: Negative.  Negative for constipation.   Genitourinary: Positive for dysuria.   Musculoskeletal: Positive for joint pain.   Skin: Negative.    Neurological: Negative.    Endo/Heme/Allergies: Negative.         Diabetic   Psychiatric/Behavioral: Negative.        PATIENT HISTORY:    Past Medical History:   Diagnosis Date    Allergy     Angio-edema     Arthritis     Cataract     Cerebrovascular malformation     Colon polyps     Coronary artery disease     Diabetes mellitus, type 2     Diabetic peripheral neuropathy     GERD (gastroesophageal reflux disease)     Herpes infection     Hyperlipidemia     Hypertension     Hypothyroidism     Kidney stones     Mild nonproliferative diabetic retinopathy of 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 edema associated with type 2 diabetes mellitus 4/18/2019    Nausea & vomiting 11/23/2015    Obesity, morbid     Ovarian cyst     Pyelonephritis, chronic     Seizure  disorder, focal motor     Sleep apnea     Type II or unspecified type diabetes mellitus with neurological manifestations, uncontrolled(250.62)     Urinary tract infection     Urticaria     Vaginal infection        Past Surgical History:   Procedure Laterality Date    CARPAL TUNNEL RELEASE Right 2014    COLONOSCOPY      COLONOSCOPY N/A 9/13/2017    Procedure: COLONOSCOPY Golytely;  Surgeon: Gisela Wall MD;  Location: [facility] END[facility];  Service: Endoscopy;  Laterality: N/A;    CYST REMOVAL      skin; multiples    EXTRACORPOREAL SHOCK WAVE LITHOTRIPSY  2002    HYSTERECTOMY  1984    JOINT REPLACEMENT Right 03/06/2019    knee    KIDNEY STONE SURGERY      KNEE ARTHROPLASTY Right 3/6/2019    Procedure: ARTHROPLASTY, KNEE;  Surgeon: Pj Gamboa MD;  Location: [facility] OR;  Service: Orthopedics;  Laterality: Right;  Depuy (Stephen notified 2/21, CC)    THYROIDECTOMY  1977         TONSILLECTOMY, ADENOIDECTOMY      TRIGGER FINGER RELEASE      TUBAL LIGATION         Family History   Problem Relation Age of Onset    Cancer Father     Arthritis Father     Gout Father     Allergies Son     Allergic rhinitis Son     Skin cancer Mother     Macular degeneration Mother     Dementia Mother     Hypertension Mother     No Known Problems Daughter     Diabetes Daughter     No Known Problems Daughter     Glaucoma Maternal Aunt         Great Maternal Aunt    Leukemia Maternal Uncle      Socioeconomic History    Marital status:    Occupational History    Occupation: retired     Employer: [employer]   Social Needs    Financial resource strain: Not hard at all    Food insecurity:     Worry: Never true     Inability: Never true    Transportation needs:     Medical: No     Non-medical: No   Tobacco Use    Smoking status: Never Smoker    Smokeless tobacco: Never Used   Substance and Sexual Activity    Alcohol use: No     Alcohol/week: 0.0 standard drinks     Frequency: Never     Drinks  per session: Patient refused     Binge frequency: Never    Drug use: No    Sexual activity: Yes     Partners: Male   Lifestyle    Physical activity:     Days per week: 0 days     Minutes per session: 0 min    Stress: Not at all   Relationships    Social connections:     Talks on phone: Once a week     Gets together: Never     Attends [employer] service: Not on file     Active member of club or organization: No     Attends meetings of clubs or organizations: Never     Relationship status:        Allergies:  Sulfa (sulfonamide antibiotics); Ciprofloxacin; Januvia [sitagliptin]; Lisinopril; Lotensin [benazepril]; and Nexium [esomeprazole magnesium]    Medications:  Outpatient Encounter Medications as of 5/14/2020   Medication Sig Dispense Refill    amLODIPine (NORVASC) 5 MG tablet Take 1 tablet (5 mg total) by mouth every evening. 90 tablet 3    amoxicillin-clavulanate 875-125mg (AUGMENTIN) 875-125 mg per tablet Take 1 tablet by mouth 2 (two) times daily. 10 tablet 0    aspirin (ECOTRIN) 81 MG EC tablet Take 81 mg by mouth once daily.      blood sugar diagnostic (TRUE METRIX GLUCOSE TEST STRIP) Strp TEST TWO TIMES DAILY 200 strip 6    blood-glucose meter Misc [employer] True Metrix Air meter 1 each 0    calcium carbonate-vitamin D3 600 mg(1,500mg) -100 unit Cap Take 1 tablet by mouth once daily.      chlorthalidone (HYGROTEN) 25 MG Tab Take 1 tablet (25 mg total) by mouth once daily. 90 tablet 3    cranberry 500 mg Cap Take by mouth.      fluticasone propionate (FLONASE) 50 mcg/actuation nasal spray USE 2 SPRAYS IN EACH NOSTRIL EVERY DAY 48 g 4    gabapentin (NEURONTIN) 600 MG tablet Take 1 tablet (600 mg total) by mouth 2 (two) times daily. 180 tablet 3    glipiZIDE (GLUCOTROL) 5 MG tablet Take 1 tablet (5 mg total) by mouth 2 (two) times daily before meals. 180 tablet 3    insulin degludec (TRESIBA FLEXTOUCH U-100) 100 unit/mL (3 mL) InPn Inject 33 Units into the skin every evening. 15 Syringe 4  "   ketotifen (ZADITOR) 0.025 % (0.035 %) ophthalmic solution Place 1-2 drops into both eyes once daily.      L.acid-B.bifidum-B.animal-FOS (PROBIOTIC COMPLEX) 25 billion cell -100 mg Cap Take 1 capsule by mouth once daily.      lancets (TRUEPLUS LANCETS) 28 gauge Misc Inject 1 lancet into the skin 2 (two) times daily before meals. 200 each 6    levothyroxine (SYNTHROID) 75 MCG tablet Take 1 tablet (75 mcg total) by mouth once daily. 90 tablet 3    loratadine (CLARITIN) 10 mg tablet Take 10 mg by mouth once daily.      lovastatin (MEVACOR) 40 MG tablet TAKE 1 TABLET NIGHTLY (SUBSTITUTED FOR MEVACOR) 90 tablet 3    magnesium oxide-Mg AA chelate (MAGNESIUM, AMINO ACID CHELATE,) 133 mg Tab Take by mouth as directed.       metFORMIN (GLUCOPHAGE) 1000 MG tablet Take 1 tablet (1,000 mg total) by mouth 2 (two) times daily with meals. 180 tablet 3    mv-mn/folic acid/vit [id number] (ALIVE ONCE DAILY WOMEN 50 PLUS ORAL) Take 1 tablet by mouth once daily.      omeprazole (PRILOSEC) 20 MG capsule Take 1 capsule (20 mg total) by mouth daily as needed. 90 capsule 1    pen needle, diabetic (BD ULTRA-FINE EDUARDO PEN NEEDLE) 32 gauge x 5/32" Ndle USE AS DIRECTED 200 each 6    potassium chloride SA (K-DUR,KLOR-CON) 10 MEQ tablet TAKE 1 TABLET EVERY DAY  EXCEPT TAKE 2 TABLETS ON MONDAY, WEDNESDAY AND FRIDAY 130 tablet 1    PREMARIN vaginal cream Place 0.5 g vaginally 3 (three) times a week. 30 g 0    semaglutide (OZEMPIC) 0.25 mg or 0.5 mg(2 mg/1.5 mL) PnIj Inject 0.25 mg into the skin every 7 days. Take 0.25 mg for 4 weeks, then increase to 0.5 mg weekly 6 Syringe 4    valACYclovir (VALTREX) 500 MG tablet Take 1 tablet (500 mg total) by mouth once daily. 90 tablet 3    valsartan (DIOVAN) 160 MG tablet Take 1 tablet (160 mg total) by mouth 2 (two) times daily. 180 tablet 3    azelastine (ASTELIN) 137 mcg (0.1 %) nasal spray USE 2 SPRAYS NASALLY TWICE DAILY 120 mL 11    doxycycline (MONODOX) 100 MG capsule Take 1 capsule " (100 mg total) by mouth 2 (two) times daily. Use sunscreen or cover and continue 1 week after completed. for 7 days 14 capsule 0    glucagon (human recombinant) inj 1mg/mL kit Inject 1 mL (1 mg total) into the muscle as needed. 1 kit 6     No facility-administered encounter medications on file as of 5/14/2020.          PHYSICAL EXAMINATION:    The patient generally appears in good health, obese, is appropriately interactive, and is in no apparent distress.    Skin: No lesions.    Mental: Cooperative with normal affect.    Neuro: Grossly intact.    HEENT: Normal. No evidence of lymphadenopathy.    Chest:  normal inspiratory effort.    Abdomen:  Soft, non-tender. No masses or organomegaly. Bladder is not palpable. No evidence of flank discomfort. No evidence of inguinal hernia.    Extremities: No clubbing, cyanosis, or edema    Normal external female genitalia,   Urethral meatus is normal  Urethra and bladder are nontender to bimanual exam  Well supported anteriorly and posteriorly, appears that she is applying the premarin appropriately.  Good ruggae  Uterus and cervix are surgically absent  No adnexal masses  PVR by catheterization was 30 ml      LABS:    Lab Results   Component Value Date    BUN 16 03/06/2020    CREATININE 0.8 03/06/2020     HgA1c on 3/6/2020 was 7.5    UA 1.020, pH 5, + nitrite, otherwise, negative    IMPRESSION:    Encounter Diagnoses   Name Primary?    Bladder infection Yes    Vaginal atrophy        PLAN:    1.  The catheterized specimen was sent for culture  2.  Empirically sent doxycycline as her last infection was sensitive.   3.  Continue the ellura, Premarin cream and probiotic  4.  Follow up in  6 months.  Discussed better diabetes control.

## 2020-05-14 NOTE — PROGRESS NOTES
Care Everywhere: updated  Immunization: updated  Health Maintenance: updated  Media Review: n/a  Legacy Review: n/a  Order placed: n/a  Upcoming appts:optometry 5.14.2020

## 2020-05-14 NOTE — PROGRESS NOTES
Digital Medicine: Health  Follow-Up    ADDENDUM: New CRM created to send correct device - [id number]. Will follow dax.    The history is provided by the patient.     Follow Up  Follow-up reason(s): reading review      Readings are missing.   cuff exchange.  Mrs. Jose is doing okay. Patient requested a new BP device be mailed to her late March 2020. Patient reports receiving an [employer] glucometer instead of a cuff. Will expedite shipment and initiate return for glucometer.       INTERVENTION(S)  encouragement/support and denied questions    PLAN  continue monitoring          Topic    Eye Exam        Last 5 Patient Entered Readings                                      Current 30 Day Average:      Recent Readings 3/27/2020 3/27/2020 3/27/2020 3/25/2020 3/25/2020    SBP (mmHg) 145 137 140 118 130    DBP (mmHg) 64 66 66 60 65    Pulse 68 69 68 61 63        Last 6 Patient Entered Readings                                          Most Recent A1c: 7.5% on 3/6/2020  (Goal: 7%)     Recent Readings 5/14/2020 5/13/2020 5/13/2020 5/12/2020 5/12/2020    Blood Glucose (mg/dL) 200 252 137 219 138                Screenings    SDOH

## 2020-05-15 ENCOUNTER — OFFICE VISIT (OUTPATIENT)
Dept: OPTOMETRY | Facility: CLINIC | Age: 70
End: 2020-05-15
Payer: MEDICARE

## 2020-05-15 DIAGNOSIS — H52.4 MYOPIA WITH ASTIGMATISM AND PRESBYOPIA, BILATERAL: ICD-10-CM

## 2020-05-15 DIAGNOSIS — E11.9 TYPE 2 DIABETES MELLITUS WITHOUT RETINOPATHY: Primary | ICD-10-CM

## 2020-05-15 DIAGNOSIS — H25.13 NUCLEAR SCLEROSIS, BILATERAL: ICD-10-CM

## 2020-05-15 DIAGNOSIS — H52.13 MYOPIA WITH ASTIGMATISM AND PRESBYOPIA, BILATERAL: ICD-10-CM

## 2020-05-15 DIAGNOSIS — H52.203 MYOPIA WITH ASTIGMATISM AND PRESBYOPIA, BILATERAL: ICD-10-CM

## 2020-05-15 PROCEDURE — 99999 PR PBB SHADOW E&M-EST. PATIENT-LVL II: CPT | Mod: PBBFAC,HCNC,, | Performed by: OPTOMETRIST

## 2020-05-15 PROCEDURE — 92015 PR REFRACTION: ICD-10-PCS | Mod: HCNC,S$GLB,, | Performed by: OPTOMETRIST

## 2020-05-15 PROCEDURE — 92014 PR EYE EXAM, EST PATIENT,COMPREHESV: ICD-10-PCS | Mod: HCNC,S$GLB,, | Performed by: OPTOMETRIST

## 2020-05-15 PROCEDURE — 92015 DETERMINE REFRACTIVE STATE: CPT | Mod: HCNC,S$GLB,, | Performed by: OPTOMETRIST

## 2020-05-15 PROCEDURE — 99999 PR PBB SHADOW E&M-EST. PATIENT-LVL II: ICD-10-PCS | Mod: PBBFAC,HCNC,, | Performed by: OPTOMETRIST

## 2020-05-15 PROCEDURE — 92014 COMPRE OPH EXAM EST PT 1/>: CPT | Mod: HCNC,S$GLB,, | Performed by: OPTOMETRIST

## 2020-05-15 NOTE — PROGRESS NOTES
DONAVON GOULD 05/2019  Diabetic  last night 295 this morning because of what   patient ate but is usually 191. Glasses about 1 yr. Old and still seem   fine.  Patient hasn't noticed any vision changes.    Hemoglobin A1C       Date                     Value               Ref Range             Status                03/06/2020               7.5 (H)             4.0 - 5.6 %           Final     .         11/08/2019               7.5 (H)             4.0 - 5.6 %           Final           07/25/2019               6.9 (H)             4.0 - 5.6 %           Final                  Last edited by Amelia Avila on 5/15/2020  1:29 PM. (History)            Assessment /Plan     For exam results, see Encounter Report.    Type 2 diabetes mellitus without retinopathy    Nuclear sclerosis, bilateral    Myopia with astigmatism and presbyopia, bilateral      1. No diabetic retinopathy, no csme. Return in 1 year for dilated eye exam.  2. Educated pt on presence of cataracts and effects on vision. No surgery at this time. Recheck in one year.  3. New Spec Rx given. Different lens options discussed with patient. RTC 1 year full exam.

## 2020-05-17 LAB — BACTERIA UR CULT: ABNORMAL

## 2020-05-19 ENCOUNTER — TELEPHONE (OUTPATIENT)
Dept: UROLOGY | Facility: CLINIC | Age: 70
End: 2020-05-19

## 2020-05-19 NOTE — TELEPHONE ENCOUNTER
----- Message from Patricia Cee MD sent at 5/18/2020  8:47 AM CDT -----  Patient was prescribed doxycycline empirically when the culture was collected.  Please let her know to complete the antibiotic.

## 2020-05-22 ENCOUNTER — PATIENT OUTREACH (OUTPATIENT)
Dept: OTHER | Facility: OTHER | Age: 70
End: 2020-05-22

## 2020-05-22 NOTE — PROGRESS NOTES
Digital Medicine: Health  Follow-Up    The history is provided by the patient.     Follow Up  Follow-up reason(s): goal follow-up      Mrs. Jose has received her new BP cuff and has resumed monitoring. She was surprised with the reading, but wants to work towards getting them in the 120s. Encouraged patient to continue monitoring in order to establish accurate BP average. Will review health goals at next outreach.      INTERVENTION(S)  encouragement/support and denied questions    PLAN  continue monitoring      There are no preventive care reminders to display for this patient.    Last 5 Patient Entered Readings                                      Current 30 Day Average: 133/57     Recent Readings 5/21/2020 3/27/2020 3/27/2020 3/27/2020 3/25/2020    SBP (mmHg) 133 145 137 140 118    DBP (mmHg) 57 64 66 66 60    Pulse 74 68 69 68 61        Last 6 Patient Entered Readings                                          Most Recent A1c: 7.5% on 3/6/2020  (Goal: 7%)     Recent Readings 5/22/2020 5/22/2020 5/21/2020 5/21/2020 5/20/2020    Blood Glucose (mg/dL) 182 130 140 144 140           Screenings - deferred

## 2020-05-28 ENCOUNTER — HOSPITAL ENCOUNTER (OUTPATIENT)
Dept: RADIOLOGY | Facility: HOSPITAL | Age: 70
Discharge: HOME | End: 2020-05-28
Attending: NURSE PRACTITIONER
Payer: MEDICARE

## 2020-05-28 ENCOUNTER — PATIENT MESSAGE (OUTPATIENT)
Dept: ENDOCRINOLOGY | Facility: CLINIC | Age: 70
End: 2020-05-28

## 2020-05-28 ENCOUNTER — IMMUNIZATION (OUTPATIENT)
Dept: PHARMACY | Facility: CLINIC | Age: 70
End: 2020-05-28
Payer: MEDICARE

## 2020-05-28 DIAGNOSIS — M81.0 AGE-RELATED OSTEOPOROSIS WITHOUT CURRENT PATHOLOGICAL FRACTURE: Primary | ICD-10-CM

## 2020-05-28 DIAGNOSIS — Z78.0 ASYMPTOMATIC MENOPAUSAL STATE: ICD-10-CM

## 2020-05-28 PROCEDURE — 77080 DXA BONE DENSITY AXIAL: CPT | Mod: TC,HCNC

## 2020-05-28 PROCEDURE — 77080 DEXA BONE DENSITY SPINE HIP: ICD-10-PCS | Mod: 26,HCNC,, | Performed by: RADIOLOGY

## 2020-05-28 PROCEDURE — 77080 DXA BONE DENSITY AXIAL: CPT | Mod: 26,HCNC,, | Performed by: RADIOLOGY

## 2020-05-28 RX ORDER — ALENDRONATE SODIUM 70 MG/1
70 TABLET ORAL
Qty: 12 TABLET | Refills: 3 | Status: SHIPPED | OUTPATIENT
Start: 2020-05-28 | End: 2020-08-07

## 2020-05-31 PROCEDURE — 99454 PR REMOTE MNTR, PHYS PARAM, INITIAL, EA 30 DAYS: ICD-10-PCS | Mod: S$GLB,,, | Performed by: INTERNAL MEDICINE

## 2020-05-31 PROCEDURE — 99454 REM MNTR PHYSIOL PARAM 16-30: CPT | Mod: S$GLB,,, | Performed by: INTERNAL MEDICINE

## 2020-06-01 ENCOUNTER — CLINICAL SUPPORT (OUTPATIENT)
Dept: INTERNAL MEDICINE | Facility: CLINIC | Age: 70
End: 2020-06-01
Payer: MEDICARE

## 2020-06-01 DIAGNOSIS — J30.1 NON-SEASONAL ALLERGIC RHINITIS DUE TO POLLEN: ICD-10-CM

## 2020-06-01 PROCEDURE — 99499 NO LOS: ICD-10-PCS | Mod: HCNC,S$GLB,, | Performed by: ALLERGY & IMMUNOLOGY

## 2020-06-01 PROCEDURE — 95115 PR IMMUNOTHERAPY, ONE INJECTION: ICD-10-PCS | Mod: HCNC,S$GLB,, | Performed by: FAMILY MEDICINE

## 2020-06-01 PROCEDURE — 99499 UNLISTED E&M SERVICE: CPT | Mod: HCNC,S$GLB,, | Performed by: ALLERGY & IMMUNOLOGY

## 2020-06-01 PROCEDURE — 95115 IMMUNOTHERAPY ONE INJECTION: CPT | Mod: HCNC,S$GLB,, | Performed by: FAMILY MEDICINE

## 2020-06-22 ENCOUNTER — CLINICAL SUPPORT (OUTPATIENT)
Dept: INTERNAL MEDICINE | Facility: CLINIC | Age: 70
End: 2020-06-22
Payer: MEDICARE

## 2020-06-22 DIAGNOSIS — J30.9 CHRONIC ALLERGIC RHINITIS: ICD-10-CM

## 2020-06-22 PROCEDURE — 95115 IMMUNOTHERAPY ONE INJECTION: CPT | Mod: HCNC,S$GLB,, | Performed by: FAMILY MEDICINE

## 2020-06-22 PROCEDURE — 99499 UNLISTED E&M SERVICE: CPT | Mod: HCNC,S$GLB,, | Performed by: ALLERGY & IMMUNOLOGY

## 2020-06-22 PROCEDURE — 99499 NO LOS: ICD-10-PCS | Mod: HCNC,S$GLB,, | Performed by: ALLERGY & IMMUNOLOGY

## 2020-06-22 PROCEDURE — 95115 PR IMMUNOTHERAPY, ONE INJECTION: ICD-10-PCS | Mod: HCNC,S$GLB,, | Performed by: FAMILY MEDICINE

## 2020-06-26 ENCOUNTER — OFFICE VISIT (OUTPATIENT)
Dept: FAMILY MEDICINE | Facility: CLINIC | Age: 70
End: 2020-06-26
Payer: MEDICARE

## 2020-06-26 VITALS
TEMPERATURE: 97 F | WEIGHT: 242.94 LBS | HEART RATE: 77 BPM | BODY MASS INDEX: 44.71 KG/M2 | DIASTOLIC BLOOD PRESSURE: 62 MMHG | HEIGHT: 62 IN | OXYGEN SATURATION: 95 % | SYSTOLIC BLOOD PRESSURE: 116 MMHG

## 2020-06-26 DIAGNOSIS — G47.33 OSA (OBSTRUCTIVE SLEEP APNEA): ICD-10-CM

## 2020-06-26 DIAGNOSIS — M85.80 OSTEOPENIA, UNSPECIFIED LOCATION: ICD-10-CM

## 2020-06-26 DIAGNOSIS — K21.9 GASTROESOPHAGEAL REFLUX DISEASE, ESOPHAGITIS PRESENCE NOT SPECIFIED: ICD-10-CM

## 2020-06-26 DIAGNOSIS — E11.42 DIABETIC POLYNEUROPATHY ASSOCIATED WITH TYPE 2 DIABETES MELLITUS: ICD-10-CM

## 2020-06-26 DIAGNOSIS — E11.22 TYPE 2 DIABETES MELLITUS WITH STAGE 3 CHRONIC KIDNEY DISEASE, WITH LONG-TERM CURRENT USE OF INSULIN: ICD-10-CM

## 2020-06-26 DIAGNOSIS — I77.9 BILATERAL CAROTID ARTERY DISEASE, UNSPECIFIED TYPE: ICD-10-CM

## 2020-06-26 DIAGNOSIS — E66.01 MORBID OBESITY WITH BODY MASS INDEX (BMI) OF 40.0 TO 44.9 IN ADULT: ICD-10-CM

## 2020-06-26 DIAGNOSIS — E11.3293 MILD NONPROLIFERATIVE DIABETIC RETINOPATHY OF BOTH EYES WITHOUT MACULAR EDEMA ASSOCIATED WITH TYPE 2 DIABETES MELLITUS: ICD-10-CM

## 2020-06-26 DIAGNOSIS — E78.5 HYPERLIPIDEMIA, UNSPECIFIED HYPERLIPIDEMIA TYPE: ICD-10-CM

## 2020-06-26 DIAGNOSIS — N18.30 TYPE 2 DIABETES MELLITUS WITH STAGE 3 CHRONIC KIDNEY DISEASE, WITH LONG-TERM CURRENT USE OF INSULIN: ICD-10-CM

## 2020-06-26 DIAGNOSIS — I10 ESSENTIAL HYPERTENSION: ICD-10-CM

## 2020-06-26 DIAGNOSIS — J30.9 CHRONIC ALLERGIC RHINITIS: ICD-10-CM

## 2020-06-26 DIAGNOSIS — Z00.00 ENCOUNTER FOR PREVENTIVE HEALTH EXAMINATION: Primary | ICD-10-CM

## 2020-06-26 DIAGNOSIS — Z79.4 TYPE 2 DIABETES MELLITUS WITH STAGE 3 CHRONIC KIDNEY DISEASE, WITH LONG-TERM CURRENT USE OF INSULIN: ICD-10-CM

## 2020-06-26 DIAGNOSIS — M46.1 SACROILIITIS: ICD-10-CM

## 2020-06-26 DIAGNOSIS — E55.9 VITAMIN D DEFICIENCY: ICD-10-CM

## 2020-06-26 DIAGNOSIS — B00.9 RECURRENT HSV (HERPES SIMPLEX VIRUS): ICD-10-CM

## 2020-06-26 DIAGNOSIS — E03.9 HYPOTHYROIDISM, UNSPECIFIED TYPE: ICD-10-CM

## 2020-06-26 DIAGNOSIS — I25.10 CORONARY ARTERY DISEASE INVOLVING NATIVE CORONARY ARTERY OF NATIVE HEART WITHOUT ANGINA PECTORIS: ICD-10-CM

## 2020-06-26 PROBLEM — R53.83 FATIGUE: Status: RESOLVED | Noted: 2018-06-21 | Resolved: 2020-06-26

## 2020-06-26 PROBLEM — M25.561 KNEE PAIN, RIGHT: Status: RESOLVED | Noted: 2019-04-05 | Resolved: 2020-06-26

## 2020-06-26 PROBLEM — R26.81 GAIT INSTABILITY: Status: RESOLVED | Noted: 2018-07-16 | Resolved: 2020-06-26

## 2020-06-26 PROCEDURE — 99499 RISK ADDL DX/OHS AUDIT: ICD-10-PCS | Mod: HCNC,S$GLB,, | Performed by: NURSE PRACTITIONER

## 2020-06-26 PROCEDURE — 3078F DIAST BP <80 MM HG: CPT | Mod: HCNC,CPTII,S$GLB, | Performed by: NURSE PRACTITIONER

## 2020-06-26 PROCEDURE — 3051F PR MOST RECENT HEMOGLOBIN A1C LEVEL 7.0 - < 8.0%: ICD-10-PCS | Mod: HCNC,CPTII,S$GLB, | Performed by: NURSE PRACTITIONER

## 2020-06-26 PROCEDURE — G0439 PR MEDICARE ANNUAL WELLNESS SUBSEQUENT VISIT: ICD-10-PCS | Mod: HCNC,S$GLB,, | Performed by: NURSE PRACTITIONER

## 2020-06-26 PROCEDURE — 3051F HG A1C>EQUAL 7.0%<8.0%: CPT | Mod: HCNC,CPTII,S$GLB, | Performed by: NURSE PRACTITIONER

## 2020-06-26 PROCEDURE — 3078F PR MOST RECENT DIASTOLIC BLOOD PRESSURE < 80 MM HG: ICD-10-PCS | Mod: HCNC,CPTII,S$GLB, | Performed by: NURSE PRACTITIONER

## 2020-06-26 PROCEDURE — 99999 PR PBB SHADOW E&M-EST. PATIENT-LVL V: CPT | Mod: PBBFAC,HCNC,, | Performed by: NURSE PRACTITIONER

## 2020-06-26 PROCEDURE — 99999 PR PBB SHADOW E&M-EST. PATIENT-LVL V: ICD-10-PCS | Mod: PBBFAC,HCNC,, | Performed by: NURSE PRACTITIONER

## 2020-06-26 PROCEDURE — G0439 PPPS, SUBSEQ VISIT: HCPCS | Mod: HCNC,S$GLB,, | Performed by: NURSE PRACTITIONER

## 2020-06-26 PROCEDURE — 3074F SYST BP LT 130 MM HG: CPT | Mod: HCNC,CPTII,S$GLB, | Performed by: NURSE PRACTITIONER

## 2020-06-26 PROCEDURE — 99499 UNLISTED E&M SERVICE: CPT | Mod: HCNC,S$GLB,, | Performed by: NURSE PRACTITIONER

## 2020-06-26 PROCEDURE — 3074F PR MOST RECENT SYSTOLIC BLOOD PRESSURE < 130 MM HG: ICD-10-PCS | Mod: HCNC,CPTII,S$GLB, | Performed by: NURSE PRACTITIONER

## 2020-06-26 NOTE — PATIENT INSTRUCTIONS
Counseling and Referral of Other Preventative  (Italic type indicates deductible and co-insurance are waived)    Patient Name: Vivienne Jose  Today's Date: 6/26/2020    Health Maintenance       Date Due Completion Date    Hemoglobin A1c 09/06/2020 3/6/2020    Foot Exam 11/08/2020 11/8/2019    Override on 8/2/2017: Done    Override on 4/28/2015: Done    Lipid Panel 03/06/2021 3/6/2020    Eye Exam 05/15/2021 5/15/2020    High Dose Statin 06/26/2021 6/26/2020    Aspirin/Antiplatelet Therapy 06/26/2021 6/26/2020    Mammogram 08/05/2021 8/5/2019    Colorectal Cancer Screening 09/13/2022 9/13/2017    DEXA SCAN 05/28/2023 5/28/2020    TETANUS VACCINE 06/19/2024 6/19/2014        No orders of the defined types were placed in this encounter.    The following information is provided to all patients.  This information is to help you find resources for any of the problems found today that may be affecting your health:                Living healthy guide: www.[facility].[place].gov      Understanding Diabetes: www.diabetes.org      Eating healthy: www.cdc.gov/healthyweight      CDC home safety checklist: www.cdc.gov/steadi/patient.html      Agency on Aging: www.goea.[place].gov      Alcoholics anonymous (AA): www.aa.org      Physical Activity: www.leno.nih.gov/ej5hvrv      Tobacco use: www.quitwithusla.org

## 2020-06-26 NOTE — PROGRESS NOTES
"Vivienne Jose presented for a  Medicare AWV and comprehensive Health Risk Assessment today. The following components were reviewed and updated:    · Medical history  · Family History  · Social history  · Allergies and Current Medications  · Health Risk Assessment  · Health Maintenance  · Care Team     ** See Completed Assessments for Annual Wellness Visit within the encounter summary.**       The following assessments were completed:  · Living Situation  · CAGE  · Depression Screening  · Timed Get Up and Go  · Whisper Test  · Cognitive Function Screening      · Nutrition Screening  · ADL Screening  · PAQ Screening    Vitals:    06/26/20 0857   BP: 116/62   BP Location: Left arm   Patient Position: Sitting   BP Method: Large (Manual)   Pulse: 77   Temp: 97.3 °F (36.3 °C)   TempSrc: Temporal   SpO2: 95%   Weight: 110.2 kg (242 lb 15.2 oz)   Height: 5' 2" (1.575 m)     Body mass index is 44.44 kg/m².     Physical Exam  Vitals signs reviewed.   Constitutional:       General: She is not in acute distress.     Appearance: Normal appearance. She is well-developed and well-groomed. She is morbidly obese.   HENT:      Head: Normocephalic and atraumatic.      Right Ear: Hearing and external ear normal. No drainage.      Left Ear: Hearing and external ear normal. No drainage.      Nose: Nose normal. No septal deviation.      Mouth/Throat:      Mouth: Mucous membranes are not pale. No oral lesions.      Pharynx: Uvula midline. No oropharyngeal exudate.   Eyes:      General: Lids are normal.         Right eye: No discharge.         Left eye: No discharge.      Conjunctiva/sclera: Conjunctivae normal.      Pupils: Pupils are equal, round, and reactive to light.      Comments: Wears glasses   Neck:      Musculoskeletal: Full passive range of motion without pain, normal range of motion and neck supple. No neck rigidity.      Vascular: No JVD.      Trachea: Trachea normal. No tracheal deviation.   Cardiovascular:      Rate and Rhythm: " Normal rate and regular rhythm.      Pulses: Normal pulses.      Heart sounds: Normal heart sounds, S1 normal and S2 normal.   Pulmonary:      Effort: Pulmonary effort is normal. No respiratory distress.      Breath sounds: Normal breath sounds.   Abdominal:      General: Bowel sounds are normal. There is no distension.      Palpations: Abdomen is soft.      Tenderness: There is no abdominal tenderness.   Musculoskeletal: Normal range of motion.   Skin:     General: Skin is warm and dry.      Capillary Refill: Capillary refill takes less than 2 seconds.      Coloration: Skin is not pale.      Findings: No rash.   Neurological:      Mental Status: She is alert and oriented to person, place, and time. She is not disoriented.      Motor: No abnormal muscle tone.      Coordination: Coordination normal.      Gait: Gait normal.   Psychiatric:         Attention and Perception: Attention normal.         Mood and Affect: Mood and affect normal.         Speech: Speech normal.         Behavior: Behavior normal. Behavior is cooperative.         Thought Content: Thought content normal.         Cognition and Memory: Memory normal.           Diagnoses and health risks identified today and associated recommendations/orders:    1. Encounter for preventive health examination    2. Type 2 diabetes mellitus with stage 3 chronic kidney disease, with long-term current use of insulin  Chronic; stable on medication. Followed by Endocrinology.    3. Mild nonproliferative diabetic retinopathy of 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 edema associated with type 2 diabetes mellitus  Chronic; stable on medication. Followed by Optometry.    4. Diabetic polyneuropathy associated with type 2 diabetes mellitus  Chronic; stable on medication. Followed by Endocrinology.    5. Essential hypertension  Chronic; stable on medication. Follow up with PCP.    6. Bilateral carotid artery disease, unspecified type  Chronic; stable on medication. Followed by  Cardiology.    7. Coronary artery disease involving native coronary artery of native heart without angina pectoris  Chronic; stable on medication. Followed by Cardiology.    8. Hyperlipidemia, unspecified hyperlipidemia type  Chronic; stable on medication. Follow up with PCP.    9. Sacroiliitis  Chronic; stable. Follow up with PCP.    10. Hypothyroidism, unspecified type  Chronic; stable on medication. Follow up with PCP.    11. Gastroesophageal reflux disease, esophagitis presence not specified  Chronic; stable on medication. Follow up with PCP.    12. Vitamin D deficiency  Chronic; stable on medication. Follow up with PCP.    13. Osteopenia, unspecified location  Chronic; stable on medication. Follow up with PCP.    14. BAM (obstructive sleep apnea)  Chronic; stable. Uses CPAP machine during sleep. Followed by Sleep Medicine.    15. Chronic allergic rhinitis  Chronic; stable on medication. Followed by Allergist.    16. Recurrent HSV (herpes simplex virus)  Chronic; stable on medication. Follow up with PCP.    17. Morbid obesity with body mass index (BMI) of 40.0 to 44.9 in adult  Encouraged patient to continue to eat a low salt/low fat ADA diet and discussed importance of engaging in physical activity at least 5x/week for a minimum of 30 min/day.      Provided Vivienne with a 5-10 year written screening schedule and personal prevention plan. Recommendations were developed using the USPSTF age appropriate recommendations. Education, counseling, and referrals were provided as needed. After Visit Summary printed and given to patient which includes a list of additional screenings/tests needed.    I offered to discuss end of life issues, including information on how to make advance directives that the patient could use to name someone who would make medical decisions on their behalf if they became too ill to make themselves.    ___Patient declined  _X_Patient already has Advanced Directives on file, and she does not need  to make any revisions at this time.      Follow up for your next annual wellness visit.       Leta Adair NP

## 2020-06-30 ENCOUNTER — TELEPHONE (OUTPATIENT)
Dept: ENDOCRINOLOGY | Facility: CLINIC | Age: 70
End: 2020-06-30

## 2020-07-10 ENCOUNTER — LAB VISIT (OUTPATIENT)
Dept: LAB | Facility: HOSPITAL | Age: 70
End: 2020-07-10
Attending: NURSE PRACTITIONER
Payer: MEDICARE

## 2020-07-10 DIAGNOSIS — E03.9 HYPOTHYROIDISM, UNSPECIFIED TYPE: ICD-10-CM

## 2020-07-10 DIAGNOSIS — N18.30 TYPE 2 DIABETES MELLITUS WITH STAGE 3 CHRONIC KIDNEY DISEASE, WITH LONG-TERM CURRENT USE OF INSULIN: ICD-10-CM

## 2020-07-10 DIAGNOSIS — Z79.4 TYPE 2 DIABETES MELLITUS WITH STAGE 3 CHRONIC KIDNEY DISEASE, WITH LONG-TERM CURRENT USE OF INSULIN: ICD-10-CM

## 2020-07-10 DIAGNOSIS — E11.22 TYPE 2 DIABETES MELLITUS WITH STAGE 3 CHRONIC KIDNEY DISEASE, WITH LONG-TERM CURRENT USE OF INSULIN: ICD-10-CM

## 2020-07-10 LAB
ALBUMIN SERPL BCP-MCNC: 3.2 G/DL (ref 3.5–5.2)
ALP SERPL-CCNC: 72 U/L (ref 55–135)
ALT SERPL W/O P-5'-P-CCNC: 42 U/L (ref 10–44)
ANION GAP SERPL CALC-SCNC: 10 MMOL/L (ref 8–16)
AST SERPL-CCNC: 51 U/L (ref 10–40)
BILIRUB SERPL-MCNC: 0.5 MG/DL (ref 0.1–1)
BUN SERPL-MCNC: 13 MG/DL (ref 8–23)
CALCIUM SERPL-MCNC: 9.9 MG/DL (ref 8.7–10.5)
CHLORIDE SERPL-SCNC: 101 MMOL/L (ref 95–110)
CHOLEST SERPL-MCNC: 140 MG/DL (ref 120–199)
CHOLEST/HDLC SERPL: 2.8 {RATIO} (ref 2–5)
CO2 SERPL-SCNC: 29 MMOL/L (ref 23–29)
CREAT SERPL-MCNC: 0.8 MG/DL (ref 0.5–1.4)
EST. GFR  (AFRICAN AMERICAN): >60 ML/MIN/1.73 M^2
EST. GFR  (NON AFRICAN AMERICAN): >60 ML/MIN/1.73 M^2
ESTIMATED AVG GLUCOSE: 166 MG/DL (ref 68–131)
GLUCOSE SERPL-MCNC: 129 MG/DL (ref 70–110)
HBA1C MFR BLD HPLC: 7.4 % (ref 4–5.6)
HDLC SERPL-MCNC: 50 MG/DL (ref 40–75)
HDLC SERPL: 35.7 % (ref 20–50)
LDLC SERPL CALC-MCNC: 68.8 MG/DL (ref 63–159)
NONHDLC SERPL-MCNC: 90 MG/DL
POTASSIUM SERPL-SCNC: 3.4 MMOL/L (ref 3.5–5.1)
PROT SERPL-MCNC: 7.7 G/DL (ref 6–8.4)
SODIUM SERPL-SCNC: 140 MMOL/L (ref 136–145)
TRIGL SERPL-MCNC: 106 MG/DL (ref 30–150)
TSH SERPL DL<=0.005 MIU/L-ACNC: 1.43 UIU/ML (ref 0.4–4)

## 2020-07-10 PROCEDURE — 80053 COMPREHEN METABOLIC PANEL: CPT | Mod: HCNC

## 2020-07-10 PROCEDURE — 83036 HEMOGLOBIN GLYCOSYLATED A1C: CPT | Mod: HCNC

## 2020-07-10 PROCEDURE — 80061 LIPID PANEL: CPT | Mod: HCNC

## 2020-07-10 PROCEDURE — 84443 ASSAY THYROID STIM HORMONE: CPT | Mod: HCNC

## 2020-07-10 PROCEDURE — 36415 COLL VENOUS BLD VENIPUNCTURE: CPT | Mod: HCNC,PO

## 2020-07-13 ENCOUNTER — CLINICAL SUPPORT (OUTPATIENT)
Dept: INTERNAL MEDICINE | Facility: CLINIC | Age: 70
End: 2020-07-13
Payer: MEDICARE

## 2020-07-13 DIAGNOSIS — J30.9 CHRONIC ALLERGIC RHINITIS: ICD-10-CM

## 2020-07-13 PROCEDURE — 95115 PR IMMUNOTHERAPY, ONE INJECTION: ICD-10-PCS | Mod: HCNC,S$GLB,, | Performed by: FAMILY MEDICINE

## 2020-07-13 PROCEDURE — 99999 PR PBB SHADOW E&M-EST. PATIENT-LVL I: CPT | Mod: PBBFAC,HCNC,,

## 2020-07-13 PROCEDURE — 99499 NO LOS: ICD-10-PCS | Mod: HCNC,S$GLB,, | Performed by: ALLERGY & IMMUNOLOGY

## 2020-07-13 PROCEDURE — 99499 UNLISTED E&M SERVICE: CPT | Mod: HCNC,S$GLB,, | Performed by: ALLERGY & IMMUNOLOGY

## 2020-07-13 PROCEDURE — 95115 IMMUNOTHERAPY ONE INJECTION: CPT | Mod: HCNC,S$GLB,, | Performed by: FAMILY MEDICINE

## 2020-07-13 PROCEDURE — 99999 PR PBB SHADOW E&M-EST. PATIENT-LVL I: ICD-10-PCS | Mod: PBBFAC,HCNC,,

## 2020-07-15 NOTE — PROGRESS NOTES
Subjective:      Patient ID: Vivienne Jose is a 70 y.o. female.    Chief Complaint:  Diabetes    History of Present Illness  Vivienne Jose is presenting today for DM type 2 & hypothyroidism follow up.     She is having a right knee replacement in March.     With regards to the diabetes:  Diagnosed with DM in her early 50s   She was told she had prediabetes in her 30s     Current regimen:  Tresiba 38u HS   Victoza 1.8 mg qd  Metformin 1000 mg BID  Glipizide 5 mg BID AC    Missed doses? No     2 times a day testing       Eats 3 meals a day.  Snacks rarely  Drinks water    Exercise - going to try to go to yoga      Hypoglycemic event? None   Corrects with juice   Has glucagon emergency kit     Known complications PN and nephropathy    Education - last visit: 05/2015     She consistently gets UTI's     Diabetes Management Status  Statin: Taking  ACE/ARB: Taking  Screening or Prevention Patient's value Goal Complete/Controlled?   HgA1C Testing and Control   Lab Results   Component Value Date    HGBA1C 7.4 (H) 07/10/2020    Annually/Less than 7.5% Yes   Lipid profile : 07/10/2020 Annually Yes   LDL control Lab Results   Component Value Date    LDLCALC 68.8 07/10/2020    Annually/Less than 100 mg/dl  Yes   Nephropathy screening Lab Results   Component Value Date    LABMICR 5.0 07/10/2020     Lab Results   Component Value Date    PROTEINUA Negative 01/27/2020    Annually Yes   Blood pressure BP Readings from Last 1 Encounters:   07/17/20 118/68    Less than 140/90 Yes   Dilated retinal exam : 05/15/2020 Annually Yes   Foot exam   : 11/08/2019 Annually Yes     With regards to her hypothyroidism:  In 1978 she had right lobectomy for goiter and multiple cysts     Current medication:  generic lt4 75 mcg      Ref. Range 7/10/2020 08:59   TSH Latest Ref Range: 0.400 - 4.000 uIU/mL 1.425     takes thyroid medication properly without food first thing in the morning.     current symptoms:   Denies weight gain  Denies Fatigue    Denies Constipation   Denies Hair loss  Denies Brittle nails  Denies Mental fog      Ref. Range 7/27/2018 07:24   Vit D, 25-Hydroxy Latest Ref Range: 30 - 96 ng/mL 41     Has BAM using cpap  Last thyroid US in 2018  FINDINGS:  History of thyroidectomy.    Right thyroid lobe measures 2.6 x 1.7 x 1.2 cm.  Parenchyma is homogeneous.  No microcalcifications.    Left thyroid lobe measures 3.4 x 1.3 x 1.5 cm.  Hypoechoic nodule measuring 1.1 cm and subcentimeter cystic nodule noted.    Isthmus measures 0.3 cm. Parenchyma is homogeneous.  No microcalcifications.    No evidence for cervical lymphadenopathy.      Impression     Residual thyroid, status post thyroidectomy.  Left thyroid nodule not meeting criteria for fine-needle aspiration.     BMD 5/28/2020:  FINDINGS:  The L1 to L4 vertebral bone mineral density is equal to 1.029 g/cm squared with a T score of -1.3.     The left femoral neck bone mineral density is equal to 0.728 g/cm squared with a T score of -2.2.     There is a 26.8% risk of a major osteoporotic fracture and a 5.9% risk of hip fracture in the next 10 years (FRAX).     Impression:  Lumbar spine osteopenia.  Left femoral neck osteopenia.    Started fosamax once weekly (6 weeks)- she was feeling achy and joint issues. Then the next week Nausea & diarrhea.    Review of Systems   Constitutional: Negative for unexpected weight change.   Eyes: Negative for visual disturbance.   Respiratory: Negative for shortness of breath.    Cardiovascular: Negative for chest pain.   Gastrointestinal: Negative for abdominal pain.   Endocrine: Negative for cold intolerance, heat intolerance, polydipsia, polyphagia and polyuria.   Musculoskeletal: Negative for arthralgias and gait problem.   Skin: Negative for wound.   Neurological: Negative for headaches.   Hematological: Does not bruise/bleed easily.   Psychiatric/Behavioral: Negative for sleep disturbance.     Objective:   Physical Exam  Vitals signs reviewed.   Neck:       Thyroid: No thyromegaly.   Cardiovascular:      Rate and Rhythm: Normal rate.      Comments: No edema present  Pulmonary:      Effort: Pulmonary effort is normal.   Abdominal:      Palpations: Abdomen is soft.     Foot exam deferred done 11/2019  injection sites are ok. No lipo hypertropthy or atrophy    Body mass index is 42.68 kg/m².    Lab Review:   Lab Results   Component Value Date    HGBA1C 7.4 (H) 07/10/2020     Lab Results   Component Value Date    CHOL 140 07/10/2020    HDL 50 07/10/2020    LDLCALC 68.8 07/10/2020    TRIG 106 07/10/2020    CHOLHDL 35.7 07/10/2020     Lab Results   Component Value Date     07/10/2020    K 3.4 (L) 07/10/2020     07/10/2020    CO2 29 07/10/2020     (H) 07/10/2020    BUN 13 07/10/2020    CREATININE 0.8 07/10/2020    CALCIUM 9.9 07/10/2020    PROT 7.7 07/10/2020    ALBUMIN 3.2 (L) 07/10/2020    BILITOT 0.5 07/10/2020    ALKPHOS 72 07/10/2020    AST 51 (H) 07/10/2020    ALT 42 07/10/2020    ANIONGAP 10 07/10/2020    ESTGFRAFRICA >60.0 07/10/2020    EGFRNONAA >60.0 07/10/2020    TSH 1.425 07/10/2020     Assessment and Plan     1. Type 2 diabetes mellitus with stage 3 chronic kidney disease, with long-term current use of insulin  Hemoglobin A1C    Comprehensive metabolic panel   2. Age-related osteoporosis without current pathological fracture     3. Diabetic polyneuropathy associated with type 2 diabetes mellitus     4. Osteopenia, unspecified location     5. BAM (obstructive sleep apnea)     6. Hypothyroidism, unspecified type  TSH   7. Essential hypertension     8. Mixed hyperlipidemia     9. Morbid obesity with body mass index (BMI) of 40.0 to 44.9 in adult     10. Vitamin D deficiency        Diabetic polyneuropathy associated with type 2 diabetes mellitus  -- Continue gabapentin and following with podiatry.  -- Educated patient on proper comfortable foot wear, to always wear dry socks, never walk barefoot, never cut toenails too short, never test bath water  with feet, encouraged patient to inspect feet daily for wounds., and if patient applies lotion to this feet to always go around the foot and not in between toes.     Type 2 diabetes mellitus with stage 3 chronic kidney disease, with long-term current use of insulin  - A1c goal 7-7.5%, dietary indiscretions is her issue   -Reviewed goals of therapy are to get the best control we can without hypoglycemia  - Discussed diet and exercise at length today     Medication changes:   Continue: Metformin & Glipizide 5 mg BID AC & tresiba 38u HS (weight based dose 44u daily) & Ozempic 0.5 mg weekly     - Reviewed patient's current insulin regimen. Clarified proper insulin dose and timing in relation to meals, etc. Insulin injection sites and proper rotation instructed.     -Advised frequent self blood glucose monitoring.  Patient encouraged to document glucose results and bring them to every clinic visit    -Hypoglycemia precautions discussed. Instructed on precautions before driving.    -Close adherence to lifestyle changes recommended.   -Periodic follow ups for eye evaluations, foot care and dental care suggested.    Post-surgical hypothyroidism  - Clinically and biochemically euthyroid  - Continue lt4 75 mcg daily   - Goal is a normal TSH  - Check TFTs and adjust dosage accordingly   - Avoid exogenous hyperthyroidism as this can accelerate bone loss and increase risk of CV complications.  - Advised to take LT4 on an empty stomach with water and to wait 30-45 minutes before eating or taking other medications   - Reviewed that symptoms of hypothyroidism may not correlate with tsh, and a normal TSH is the goal of therapy.....  symptoms are not a justification for over treatment     BAM on CPAP  - Continue CPAP use     Osteopenia  -- osteopenic but FRAX scores meet treatment. Tried fosamax but had SE so dc'ed herself  -- discussed other options and she refused at this time  -- discussed fall precautions   -- continue Ca & vit D  supplement     Hypertension associated with diabetes  - Tolerating ARB  - Controlled     Dyslipidemia associated with type 2 diabetes mellitus  - Controlled   -  statin per ADA recommendations    Morbid obesity with BMI of 40.0-44.9, adult  -- encouraged dietary and lifestyle modifications   -- emphasized weight loss goals     Vitamin D deficiency  - continue over the counter 2000 IU a day      Follow up in about 4 months (around 11/17/2020).  Labs prior to next appointment with me

## 2020-07-17 ENCOUNTER — OFFICE VISIT (OUTPATIENT)
Dept: ENDOCRINOLOGY | Facility: CLINIC | Age: 70
End: 2020-07-17
Payer: MEDICARE

## 2020-07-17 VITALS
BODY MASS INDEX: 42.69 KG/M2 | SYSTOLIC BLOOD PRESSURE: 118 MMHG | HEART RATE: 60 BPM | DIASTOLIC BLOOD PRESSURE: 68 MMHG | HEIGHT: 63 IN | WEIGHT: 240.94 LBS

## 2020-07-17 DIAGNOSIS — E55.9 VITAMIN D DEFICIENCY: ICD-10-CM

## 2020-07-17 DIAGNOSIS — E66.01 MORBID OBESITY WITH BODY MASS INDEX (BMI) OF 40.0 TO 44.9 IN ADULT: ICD-10-CM

## 2020-07-17 DIAGNOSIS — E78.2 MIXED HYPERLIPIDEMIA: ICD-10-CM

## 2020-07-17 DIAGNOSIS — M81.0 AGE-RELATED OSTEOPOROSIS WITHOUT CURRENT PATHOLOGICAL FRACTURE: ICD-10-CM

## 2020-07-17 DIAGNOSIS — I10 ESSENTIAL HYPERTENSION: ICD-10-CM

## 2020-07-17 DIAGNOSIS — E03.9 HYPOTHYROIDISM, UNSPECIFIED TYPE: ICD-10-CM

## 2020-07-17 DIAGNOSIS — N18.30 TYPE 2 DIABETES MELLITUS WITH STAGE 3 CHRONIC KIDNEY DISEASE, WITH LONG-TERM CURRENT USE OF INSULIN: Primary | ICD-10-CM

## 2020-07-17 DIAGNOSIS — Z79.4 TYPE 2 DIABETES MELLITUS WITH STAGE 3 CHRONIC KIDNEY DISEASE, WITH LONG-TERM CURRENT USE OF INSULIN: Primary | ICD-10-CM

## 2020-07-17 DIAGNOSIS — E11.42 DIABETIC POLYNEUROPATHY ASSOCIATED WITH TYPE 2 DIABETES MELLITUS: ICD-10-CM

## 2020-07-17 DIAGNOSIS — G47.33 OSA (OBSTRUCTIVE SLEEP APNEA): ICD-10-CM

## 2020-07-17 DIAGNOSIS — M85.80 OSTEOPENIA, UNSPECIFIED LOCATION: ICD-10-CM

## 2020-07-17 DIAGNOSIS — E11.22 TYPE 2 DIABETES MELLITUS WITH STAGE 3 CHRONIC KIDNEY DISEASE, WITH LONG-TERM CURRENT USE OF INSULIN: Primary | ICD-10-CM

## 2020-07-17 PROCEDURE — 1159F PR MEDICATION LIST DOCUMENTED IN MEDICAL RECORD: ICD-10-PCS | Mod: HCNC,S$GLB,, | Performed by: NURSE PRACTITIONER

## 2020-07-17 PROCEDURE — 1159F MED LIST DOCD IN RCRD: CPT | Mod: HCNC,S$GLB,, | Performed by: NURSE PRACTITIONER

## 2020-07-17 PROCEDURE — 3051F PR MOST RECENT HEMOGLOBIN A1C LEVEL 7.0 - < 8.0%: ICD-10-PCS | Mod: HCNC,CPTII,S$GLB, | Performed by: NURSE PRACTITIONER

## 2020-07-17 PROCEDURE — 3078F PR MOST RECENT DIASTOLIC BLOOD PRESSURE < 80 MM HG: ICD-10-PCS | Mod: HCNC,CPTII,S$GLB, | Performed by: NURSE PRACTITIONER

## 2020-07-17 PROCEDURE — 99999 PR PBB SHADOW E&M-EST. PATIENT-LVL V: CPT | Mod: PBBFAC,HCNC,, | Performed by: NURSE PRACTITIONER

## 2020-07-17 PROCEDURE — 99214 OFFICE O/P EST MOD 30 MIN: CPT | Mod: HCNC,S$GLB,, | Performed by: NURSE PRACTITIONER

## 2020-07-17 PROCEDURE — 3008F BODY MASS INDEX DOCD: CPT | Mod: HCNC,CPTII,S$GLB, | Performed by: NURSE PRACTITIONER

## 2020-07-17 PROCEDURE — 1126F AMNT PAIN NOTED NONE PRSNT: CPT | Mod: HCNC,S$GLB,, | Performed by: NURSE PRACTITIONER

## 2020-07-17 PROCEDURE — 3074F SYST BP LT 130 MM HG: CPT | Mod: HCNC,CPTII,S$GLB, | Performed by: NURSE PRACTITIONER

## 2020-07-17 PROCEDURE — 3078F DIAST BP <80 MM HG: CPT | Mod: HCNC,CPTII,S$GLB, | Performed by: NURSE PRACTITIONER

## 2020-07-17 PROCEDURE — 1101F PT FALLS ASSESS-DOCD LE1/YR: CPT | Mod: HCNC,CPTII,S$GLB, | Performed by: NURSE PRACTITIONER

## 2020-07-17 PROCEDURE — 99999 PR PBB SHADOW E&M-EST. PATIENT-LVL V: ICD-10-PCS | Mod: PBBFAC,HCNC,, | Performed by: NURSE PRACTITIONER

## 2020-07-17 PROCEDURE — 1101F PR PT FALLS ASSESS DOC 0-1 FALLS W/OUT INJ PAST YR: ICD-10-PCS | Mod: HCNC,CPTII,S$GLB, | Performed by: NURSE PRACTITIONER

## 2020-07-17 PROCEDURE — 3051F HG A1C>EQUAL 7.0%<8.0%: CPT | Mod: HCNC,CPTII,S$GLB, | Performed by: NURSE PRACTITIONER

## 2020-07-17 PROCEDURE — 1126F PR PAIN SEVERITY QUANTIFIED, NO PAIN PRESENT: ICD-10-PCS | Mod: HCNC,S$GLB,, | Performed by: NURSE PRACTITIONER

## 2020-07-17 PROCEDURE — 3008F PR BODY MASS INDEX (BMI) DOCUMENTED: ICD-10-PCS | Mod: HCNC,CPTII,S$GLB, | Performed by: NURSE PRACTITIONER

## 2020-07-17 PROCEDURE — 99214 PR OFFICE/OUTPT VISIT, EST, LEVL IV, 30-39 MIN: ICD-10-PCS | Mod: HCNC,S$GLB,, | Performed by: NURSE PRACTITIONER

## 2020-07-17 PROCEDURE — 3074F PR MOST RECENT SYSTOLIC BLOOD PRESSURE < 130 MM HG: ICD-10-PCS | Mod: HCNC,CPTII,S$GLB, | Performed by: NURSE PRACTITIONER

## 2020-07-29 ENCOUNTER — OFFICE VISIT (OUTPATIENT)
Dept: SLEEP MEDICINE | Facility: CLINIC | Age: 70
End: 2020-07-29
Payer: MEDICARE

## 2020-07-29 DIAGNOSIS — G47.33 OSA (OBSTRUCTIVE SLEEP APNEA): Primary | ICD-10-CM

## 2020-07-29 PROCEDURE — 1101F PR PT FALLS ASSESS DOC 0-1 FALLS W/OUT INJ PAST YR: ICD-10-PCS | Mod: HCNC,CPTII,95, | Performed by: PSYCHIATRY & NEUROLOGY

## 2020-07-29 PROCEDURE — 1159F PR MEDICATION LIST DOCUMENTED IN MEDICAL RECORD: ICD-10-PCS | Mod: HCNC,95,, | Performed by: PSYCHIATRY & NEUROLOGY

## 2020-07-29 PROCEDURE — 99213 OFFICE O/P EST LOW 20 MIN: CPT | Mod: HCNC,95,, | Performed by: PSYCHIATRY & NEUROLOGY

## 2020-07-29 PROCEDURE — 99213 PR OFFICE/OUTPT VISIT, EST, LEVL III, 20-29 MIN: ICD-10-PCS | Mod: HCNC,95,, | Performed by: PSYCHIATRY & NEUROLOGY

## 2020-07-29 PROCEDURE — 1159F MED LIST DOCD IN RCRD: CPT | Mod: HCNC,95,, | Performed by: PSYCHIATRY & NEUROLOGY

## 2020-07-29 PROCEDURE — 1101F PT FALLS ASSESS-DOCD LE1/YR: CPT | Mod: HCNC,CPTII,95, | Performed by: PSYCHIATRY & NEUROLOGY

## 2020-07-29 NOTE — PROGRESS NOTES
Vivienne Jose  got her new Respironics Dreamwear machine from [facility] on 12/24/19.    The patient reports improved sleep continuity and daytime sleepiness on PAP. ESS today is 13/24.  Denies break through snoring.  No dry mouth.   No aerophagia or air hunger. No significant mask leaks and discomfort.  APAP 13-19  90% 14 cm H2O  She prefers nasal masks.     Therapy Event Summary (Last 14 Days)     Hide      Compliance Summary  Apnea Indices  Ventilator Statistics      Average Breath Rate:  N/A    Days with Device Usage:  14 days  Average AHI:  0.5  Average % Patient Triggered Breaths:  N/A    Percentage of Days >=4 Hours:  100.0%  Average OA Index:  0.1  Average Tidal Volume:  N/A    Average Usage (Days Used):  9 hrs. 14 mins. 9 secs.  Average CA Index:  0.2  Average Minute Vent:  N/A    Average Usage (All Days):  9 hrs. 14 mins. 9 secs.    Average % Flow Limited Breaths:  N/A        Large Leak  Periodic Breathing     Average Time in Large Leak:  0 secs.  Average % of Night in PB:  0.0%     Average % of Night in Large Leak:  0.0%                     PREVIOUS SLEEP HISTORY:    08/28/2018 INITIAL HISTORY OF PRESENT ILLNESS:  Vivienne Jose is a 70 y.o. female is here to be evaluated for a sleep disorder.       CHIEF COMPLAINT:      The patient's complaints include excessive daytime sleepiness, excessive daytime fatigue, snoring,  witnessed breathing pauses,  gasping for air in sleep and interrupted sleep since  2004, when she was diagnosed with severe BAM.    She has been well controlled for about 10 years.    She was previously seeing Dr. Wilkins.    HEr machine is 10 years old and needs replacement.    She noticed feeling more tired and waking up more frequently over the last year.    She reports occasional break through snoring.    She believes she has gained 50 lbs since titration.    She replaces her Durand mask regularly with a chin strap.    Reports  dry mouth and sore throat  Reports nasal congestion - Claritin  and now Flonase and Asteline  Reports  morning headaches  Reports  interrupted sleep  Denies frequent leg movements  Denies symptoms concerning for parasomnia    The ESS ([place] Sleepiness Score) taken on initial visit is 15 /24    The patient had tonsillectomy in the past     06/11/2014: She comes with her new Resmed machine  today. Set at 12 cm H2O. No issue with mouthleak, dry mouth, break through snoring. Recent titration showed 12-13 cm H2O to be effective. Unfortunately she is still feeling sleepy - ESS 18/24. No PLMS found on sleep study.    CPAP pressure: 12 cm H2O  Mask comfort / fit:  Durand - fits fine    Pressure tolerance: fine; not using ramp   Humidification: fine   CPAP Interrogation:    Ave daily usage:7.8 hours /7days  Ave daily usage:7.8 hours /30days  Days >4 hours usage: 7 /7 days  Days >4 hours usage: 30/30 days   Machine condition: good     Frequent arousals were noted even when her respiratory events have been controlled.Her bedroom is dark and she denies physical discomfort and polyuria. No PLMS reported on the sleep study. Denied leg discomfort.    08/28/2018: AStkoffi sleepy with inactivity. Significant early afternoon drowsiness.   CPAP 12 cm (211 on manometry). Study 2014 recommended 12-13 cm.  At times needs to rearranged her Durand mask at night.  Using chin strap. Did not like Dreamwear.No break through snoring.  Gained 2 lbs.    AHi 0.3  9 hrs per day  3 L/min leak  100% Compliance.    EPWORTH SLEEPINESS SCALE 8/23/2018   Sitting and reading 3   Watching TV 2   Sitting, inactive in a public place (e.g. a theatre or a meeting) 1   As a passenger in a car for an hour without a break 2   Lying down to rest in the afternoon when circumstances permit 3   Sitting and talking to someone 0   Sitting quietly after a lunch without alcohol 0   In a car, while stopped for a few minutes in traffic 0   Total score 11     12/10/2018:    CPAP 13 cm on Airsence straight CPAP. Feeling better at 13.5, but  still residual sleepiness.  Concerned about Gabapentin effect. Recent titration showed CPAP 12 cm as effective in terms of AHI.  Dide not tolerate FFM - very uncomfortable; back to Durand for hear  Keeps humidity 2/8 because of condensation (bedroom very cold).  No break through snoring.  The patient reports improved sleep continuity and daytime sleepiness on PAP. ESS today is 14/24.  Denies break through snoring. ++++ dry mouth.     Recent titration was discussed.    EPWORTH SLEEPINESS SCALE 12/8/2018   Sitting and reading 3   Watching TV 2   Sitting, inactive in a public place (e.g. a theatre or a meeting) 1   As a passenger in a car for an hour without a break 3   Lying down to rest in the afternoon when circumstances permit 3   Sitting and talking to someone 0   Sitting quietly after a lunch without alcohol 2   In a car, while stopped for a few minutes in traffic 0   Total score 14         02/27/2019:  Vivienne Jose still reports interrupted sleep and significant daytime sleepiness. On Gabapentin 600 mg BID.  Failed Dreamwear and Dreamwear full - comfortable but bad seal. Prefers Durand LT Medium cushion.  Decided to stary with Durand LT Medium.   Decided not to try Nuvigil/Provigil.  Doing well with pressure increase 14.5-18 cm H2O.     30/30 hrs  6-9 hrs per night  AHI 0.3    Reports always being sleepy since college years. She has always fallen asleep while reading. It her life it has been hard to stay awake through the mess.  Denied dreams while napping.   Very hard to stay asleep while working.   + occasional sleep paralysis.  Awaiting orthopaedic surgery.         09/25/2019;    Vivienne Jose still reports significant sleepiness despite compliant CPAP use. Dozed off waiting for this appointment.   LAst titration showed extremely well controlled, however SaO2 min was at low 90%s even at the highest tried pressure.  I wonder if BPAP could be a better option as her new machine. Her machine is almost due for  "replacement.   Decreasing Gabapentin did not help afternoon drowsiness.    ESS ([place] Sleepiness Scale)   Wearing a chin strap (still mouth breaths)  CPAP 9.5 (straing CPAp Airsence)  HRs 9.5  30/30  AHI 0.7  ESS 11/24    12.12.19: Vivienne Jose  was seen as f/u for mgt of BAM, accompanied by her .   She had titration study, good at cpap 14cm but no supine REM sleep so apap recommended. She is eligible for new machine but hadn' t heard from anyone. Continues to use cpap 15cm. "can;t sleep without it" or else can feel throat closing. +++oral drying despite white chin strap going over her mouth. Knows she is mouth breather but FFM intolerable in past (too much air on face). She is not sleepy at work or engaged or while driving, but b/t 2-4pm when sedentary ie [employer] or weekends may almost doze off but doesn't.   Interrogation- resmed airsense machine. AHI 0.3, avg use 9.2h/night. Durand LT mask. Got machine 4/2014. Tried many other nose pillow masks also.       SLEEP ROUTINE 07/29/2020 :    Bed partner:   Time to bed: 10 PM  Sleep onset latency: 5 min  Disruptions or awakenings: 4-5  Time to fall back into sleep: 1 min  Wakeup time: 5 AM   Perceived sleep quality: 2/5  Perceived total sleep time:  7  hours.  Daytime naps: 0  Weekend sleep routine: 7 AM  Exercise routine: N/A     PREVIOUS SLEEP STUDIES:     PSG/ SPLIT night study  In 2004 showed significant BAM with the AHI of 33.1/hour and SaO2 minimum of 88 %. Effective control of respiratory events was achieved at   10 cm H2O. Sleep efficiency was 79 %.    CPAP titration on 4/23/14: Adequate control of respiratory events was achieved at 12-13 cm of H2O. Weight 238 lbs. Frequent arousals were noted even when her respiratory events have been controlled.  CPAP titration on 9/19/18: Effective control of respiratory events was achieved at 12 cm of H2O, however SaO2 was high 80s-low 90s. Weight 241 lbs.        DME: [facility]      PAST MEDICAL " HISTORY:    Active Ambulatory Problems     Diagnosis Date Noted    Trigger middle finger of right hand 07/30/2012    Kidney stones 08/24/2012    Essential hypertension 08/24/2012    CAD (coronary artery disease) 11/23/2012    Chronic allergic rhinitis 11/30/2012    Nuclear sclerosis - Both Eyes 04/02/2013    Carpal tunnel syndrome of right wrist 07/08/2014    Proteinuria 02/03/2015    BAM (obstructive sleep apnea) 04/28/2015    Sacroiliitis 06/09/2015    Facet arthritis of lumbar region 06/09/2015    Physical deconditioning 06/09/2015    Osteopenia 09/22/2015    Vitamin D deficiency 09/22/2015    Hypocalcemia 02/29/2016    Morbid obesity with body mass index (BMI) of 40.0 to 44.9 in adult 02/29/2016    Recurrent HSV (herpes simplex virus) 03/21/2016    Chronic pain of right knee 05/02/2016    Gastroesophageal reflux disease 07/22/2016    Hyperlipidemia 07/22/2016    Nephrolithiasis 12/14/2016    Recurrent UTI 03/02/2017    Hypokalemia 04/24/2017    Diabetic polyneuropathy associated with type 2 diabetes mellitus 04/24/2017    Diverticulosis of intestine without bleeding 08/02/2017    Skin nodule 08/02/2017    Facet arthritis of cervical region 07/25/2011    Bilateral carotid artery disease 07/26/2011    Type 2 diabetes mellitus with stage 3 chronic kidney disease, with long-term current use of insulin 02/23/2018    Interstitial cystitis 09/21/2018    Chronic left-sided low back pain with left-sided sciatica 10/22/2018    Hematuria 11/16/2018    S/P total knee arthroplasty, right 03/07/2019    Decreased range of motion of right knee 04/05/2019    Muscle weakness of lower extremity 04/05/2019    Impaired gait and mobility 04/05/2019    Mild nonproliferative diabetic retinopathy of 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 edema associated with type 2 diabetes mellitus 04/18/2019    Hypothyroidism 08/05/2019     Resolved Ambulatory Problems     Diagnosis Date Noted    Allergic conjunctivitis  11/23/2012    Post-surgical hypothyroidism 11/23/2012    Pain in limb 07/24/2014    Stiffness of joint 07/24/2014    Muscle weakness 07/24/2014    Screen for colon cancer 08/29/2014    UTI symptoms 11/05/2014    URI (upper respiratory infection) 01/07/2015    Dyslipidemia associated with type 2 diabetes mellitus 04/28/2015    LBP radiating to left leg 06/12/2015    Preventative health care 09/22/2015    Allergic rhinitis 09/22/2015    Type 2 diabetes mellitus, uncontrolled 09/22/2015    Hypertension associated with diabetes 09/22/2015    Sleep apnea 09/22/2015    Body mass index 45.0-49.9, adult 09/22/2015    Nausea & vomiting 11/23/2015    Dysuria 02/29/2016    Soft tissue mass 08/04/2017    Chronic kidney disease, stage III (moderate) 02/23/2018    Low vitamin D level 03/14/2018    Fatigue 06/21/2018    Gait instability 07/16/2018    Primary osteoarthritis of right knee 03/06/2019    Knee pain, right 04/05/2019     Past Medical History:   Diagnosis Date    Allergy     Angio-edema     Arthritis     Cataract     Cerebrovascular malformation     Colon polyps     Coronary artery disease     Diabetes mellitus, type 2     Diabetic peripheral neuropathy     GERD (gastroesophageal reflux disease)     Herpes infection     Hypertension     Obesity, morbid     Ovarian cyst     Pyelonephritis, chronic     Seizure disorder, focal motor     Type II or unspecified type diabetes mellitus with neurological manifestations, uncontrolled(250.62)     Urinary tract infection     Urticaria     Vaginal infection                 PAST SURGICAL HISTORY:    Past Surgical History:   Procedure Laterality Date    CARPAL TUNNEL RELEASE Right 2014    COLONOSCOPY      COLONOSCOPY N/A 9/13/2017    Procedure: COLONOSCOPY Golytely;  Surgeon: Gisela Wall MD;  [facility];  Service: Endoscopy;  Laterality: N/A;    CYST REMOVAL      skin; multiples    EXTRACORPOREAL SHOCK WAVE  LITHOTRIPSY  2002    HYSTERECTOMY  1984    JOINT REPLACEMENT Right 03/06/2019    knee    KIDNEY STONE SURGERY      KNEE ARTHROPLASTY Right 3/6/2019    Procedure: ARTHROPLASTY, KNEE;  Surgeon: Pj Gamboa MD;  Location: [facility];  Service: Orthopedics;  Laterality: Right;  Depuy (Stephen notified 2/21, CC)    THYROIDECTOMY  1977         TONSILLECTOMY, ADENOIDECTOMY      TRIGGER FINGER RELEASE      TUBAL LIGATION           FAMILY HISTORY:                Family History   Problem Relation Age of Onset    Cancer Father     Arthritis Father     Gout Father     Allergies Son     Allergic rhinitis Son     Skin cancer Mother     Macular degeneration Mother     Dementia Mother     Hypertension Mother     No Known Problems Daughter     Diabetes Daughter     Hypertension Daughter     No Known Problems Daughter     Glaucoma Maternal Aunt         Great Maternal Aunt    Leukemia Maternal Uncle     Amblyopia Neg Hx     Cataracts Neg Hx     Retinal detachment Neg Hx     Strabismus Neg Hx     Stroke Neg Hx     Thyroid disease Neg Hx     Kidney disease Neg Hx     Angioedema Neg Hx     Asthma Neg Hx     Atopy Neg Hx     Eczema Neg Hx     Immunodeficiency Neg Hx     Rhinitis Neg Hx     Urticaria Neg Hx        SOCIAL HISTORY:          Tobacco:   Social History     Tobacco Use   Smoking Status Never Smoker   Smokeless Tobacco Never Used       alcohol use:    Social History     Substance and Sexual Activity   Alcohol Use No    Alcohol/week: 0.0 standard drinks    Frequency: Never    Drinks per session: Patient refused    Binge frequency: Never                 Occupation:     ALLERGIES:    Review of patient's allergies indicates:   Allergen Reactions    Sulfa (sulfonamide antibiotics) Nausea Only    Ciprofloxacin     Januvia [sitagliptin]      abd pain    Lisinopril     Lotensin [benazepril]     Nexium [esomeprazole magnesium] Other (See Comments)     Gas       CURRENT MEDICATIONS:     Current Outpatient Medications   Medication Sig Dispense Refill    alendronate (FOSAMAX) 70 MG tablet Take 1 tablet (70 mg total) by mouth every 7 days. Take with a full glass of water & remain upright for at least 30 minutes (Patient not taking: Reported on 7/17/2020) 12 tablet 3    amLODIPine (NORVASC) 5 MG tablet Take 1 tablet (5 mg total) by mouth every evening. 90 tablet 3    aspirin (ECOTRIN) 81 MG EC tablet Take 81 mg by mouth once daily.      azelastine (ASTELIN) 137 mcg (0.1 %) nasal spray USE 2 SPRAYS NASALLY TWICE DAILY 120 mL 11    blood sugar diagnostic (TRUE METRIX GLUCOSE TEST STRIP) Strp TEST TWO TIMES DAILY 200 strip 6    blood-glucose meter Misc [employer] True Metrix Air meter 1 each 0    calcium carbonate-vitamin D3 600 mg(1,500mg) -100 unit Cap Take 1 tablet by mouth once daily.      chlorthalidone (HYGROTEN) 25 MG Tab Take 1 tablet (25 mg total) by mouth once daily. 90 tablet 3    cranberry 500 mg Cap Take by mouth.      fluticasone propionate (FLONASE) 50 mcg/actuation nasal spray USE 2 SPRAYS IN EACH NOSTRIL EVERY DAY (Patient not taking: Reported on 7/17/2020) 48 g 4    gabapentin (NEURONTIN) 600 MG tablet Take 1 tablet (600 mg total) by mouth 2 (two) times daily. 180 tablet 3    glipiZIDE (GLUCOTROL) 5 MG tablet Take 1 tablet (5 mg total) by mouth 2 (two) times daily before meals. 180 tablet 3    glucagon (human recombinant) inj 1mg/mL kit Inject 1 mL (1 mg total) into the muscle as needed. (Patient not taking: Reported on 6/26/2020) 1 kit 6    insulin degludec (TRESIBA FLEXTOUCH U-100) 100 unit/mL (3 mL) InPn Inject 33 Units into the skin every evening. 15 Syringe 4    ketotifen (ZADITOR) 0.025 % (0.035 %) ophthalmic solution Place 1-2 drops into both eyes once daily.      L.acid-B.bifidum-B.animal-FOS (PROBIOTIC COMPLEX) 25 billion cell -100 mg Cap Take 1 capsule by mouth once daily.      lancets (TRUEPLUS LANCETS) 28 gauge Misc Inject 1 lancet into the skin 2 (two) times  "daily before meals. 200 each 6    levothyroxine (SYNTHROID) 75 MCG tablet Take 1 tablet (75 mcg total) by mouth once daily. 90 tablet 3    loratadine (CLARITIN) 10 mg tablet Take 10 mg by mouth once daily.      lovastatin (MEVACOR) 40 MG tablet TAKE 1 TABLET NIGHTLY (SUBSTITUTED FOR MEVACOR) 90 tablet 3    magnesium oxide-Mg AA chelate (MAGNESIUM, AMINO ACID CHELATE,) 133 mg Tab Take by mouth as directed.       metFORMIN (GLUCOPHAGE) 1000 MG tablet Take 1 tablet (1,000 mg total) by mouth 2 (two) times daily with meals. 180 tablet 3    mv-mn/folic acid/vit [id number] (ALIVE ONCE DAILY WOMEN 50 PLUS ORAL) Take 1 tablet by mouth once daily.      omeprazole (PRILOSEC) 20 MG capsule Take 1 capsule (20 mg total) by mouth daily as needed. 90 capsule 1    pen needle, diabetic (BD ULTRA-FINE EDUARDO PEN NEEDLE) 32 gauge x 5/32" Ndle USE AS DIRECTED 200 each 6    potassium chloride SA (K-DUR,KLOR-CON) 10 MEQ tablet TAKE 1 TABLET EVERY DAY  EXCEPT TAKE 2 TABLETS ON MONDAY, WEDNESDAY AND FRIDAY 130 tablet 1    PREMARIN vaginal cream Place 0.5 g vaginally 3 (three) times a week. (Patient not taking: Reported on 7/17/2020) 30 g 0    semaglutide (OZEMPIC) 0.25 mg or 0.5 mg(2 mg/1.5 mL) PnIj Inject 0.25 mg into the skin every 7 days. Take 0.25 mg for 4 weeks, then increase to 0.5 mg weekly 6 Syringe 4    valACYclovir (VALTREX) 500 MG tablet Take 1 tablet (500 mg total) by mouth once daily. 90 tablet 3    valsartan (DIOVAN) 160 MG tablet Take 1 tablet (160 mg total) by mouth 2 (two) times daily. 180 tablet 3     No current facility-administered medications for this visit.                       REVIEW OF SYSTEMS:   Sleep related symptoms as per HPI    reports weight gain - 50 lbs since last titration  Denies dyspnea  Denies palpitations  Denies acid reflux   Reports occasional polyuria  Reports  mood diturbance  Denies  anemia  Reports  muscle pain - joint stiffness due to arthritis    Otherwise, a balance of 10 systems " "reviewed is negative.    PHYSICAL EXAM:  LMP  (LMP Unknown)   GENERAL: Overweight body habitus, well groomed.  HEENT:   HEENT:  Conjunctivae are non-erythematous; Pupils equal, round, and reactive to light; Nose is symmetrical; Nasal mucosa is pink and moist; Nasal airflow is diminished on the right; Posterior pharynx is pink; Modified Mallampati:IV; Posterior palate is low; Tonsils absent; Uvula is elongated;Tongue is broad; Dentition is fair; No TMJ tenderness; Jaw opening and protrusion without click and without discomfort.  NECK: Supple. Neck circumference is 17 inches. No thyromegaly. No palpable nodes.     SKIN: On face and neck: No abrasions, no rashes, no lesions.  No subcutaneous nodules are palpable.  RESPIRATORY: Chest is clear to auscultation.  Normal chest expansion and non-labored breathing at rest.  CARDIOVASCULAR: Normal S1, S2.  No murmurs, gallops or rubs. No carotid bruits bilaterally.  No edema. No clubbing. No cyanosis.    NEURO: Oriented to time, place and person. Normal attention span and concentration. Gait normal.    PSYCH: Affect is full. Mood is normal  MUSCULOSKELETAL: Moves 4 extremities. Gait normal.       ASSESSMENT:    1.Previously diagnosed severe  BAM (obstructive sleep apnea). The patient symptomatically has  excessive daytime sleepiness, snoring,  witnessed breathing pauses, excessive daytime fatigue, gasping for air in sleep and interrupted sleep  with exam findings of "a crowded oral airway and elevated body mass index. The patient has medical co-morbidities of CAD, diabetes, heart disease and hypertension,  which can be worsened by BAM. Likes her new machine. Benefiting from CPAP use in terms of sleep continuity and daytime sleepiness. Remains compliant.    2. Apparently lifetime h/o sleepiness - need to r/o a central hypersomnolence disorder.      PLAN:      Continue APAP 13-19  Will renew supplies      More than 25 minutes of this 45 minutes visit was spent in counseling: " during our discussion today, we talked about the etiology of BAM as well as the potential ramifications of untreated sleep apnea, which could include daytime sleepiness, hypertension, heart disease and/or stroke.  We discussed potential treatment options, which could include weight loss, body positioning, continuous positive airway pressure (CPAP), or referral for surgical consideration. Meanwhile, she  is urged to avoid supine sleep, weight gain and alcoholic beverages since all of these can worsen BAM.     Precautions: The patient was advised to abstain from driving should he feel sleepy or drowsy.    Follow up: 12 months

## 2020-07-31 PROCEDURE — 99454 PR REMOTE MNTR, PHYS PARAM, INITIAL, EA 30 DAYS: ICD-10-PCS | Mod: S$GLB,,, | Performed by: INTERNAL MEDICINE

## 2020-07-31 PROCEDURE — 99454 REM MNTR PHYSIOL PARAM 16-30: CPT | Mod: S$GLB,,, | Performed by: INTERNAL MEDICINE

## 2020-08-06 ENCOUNTER — OFFICE VISIT (OUTPATIENT)
Dept: CARDIOLOGY | Facility: CLINIC | Age: 70
End: 2020-08-06
Payer: MEDICARE

## 2020-08-06 VITALS
OXYGEN SATURATION: 97 % | DIASTOLIC BLOOD PRESSURE: 76 MMHG | SYSTOLIC BLOOD PRESSURE: 121 MMHG | BODY MASS INDEX: 43.35 KG/M2 | HEART RATE: 77 BPM | WEIGHT: 244.69 LBS

## 2020-08-06 DIAGNOSIS — E11.69 DYSLIPIDEMIA ASSOCIATED WITH TYPE 2 DIABETES MELLITUS: ICD-10-CM

## 2020-08-06 DIAGNOSIS — E78.5 DYSLIPIDEMIA ASSOCIATED WITH TYPE 2 DIABETES MELLITUS: ICD-10-CM

## 2020-08-06 DIAGNOSIS — E11.59 HYPERTENSION ASSOCIATED WITH DIABETES: ICD-10-CM

## 2020-08-06 DIAGNOSIS — I15.2 HYPERTENSION ASSOCIATED WITH DIABETES: ICD-10-CM

## 2020-08-06 DIAGNOSIS — G47.33 OSA (OBSTRUCTIVE SLEEP APNEA): ICD-10-CM

## 2020-08-06 DIAGNOSIS — E11.9 DIABETES MELLITUS WITHOUT COMPLICATION: ICD-10-CM

## 2020-08-06 DIAGNOSIS — I25.10 CORONARY ARTERY DISEASE INVOLVING NATIVE CORONARY ARTERY OF NATIVE HEART WITHOUT ANGINA PECTORIS: Primary | ICD-10-CM

## 2020-08-06 PROCEDURE — 3051F HG A1C>EQUAL 7.0%<8.0%: CPT | Mod: HCNC,CPTII,S$GLB, | Performed by: INTERNAL MEDICINE

## 2020-08-06 PROCEDURE — 1159F MED LIST DOCD IN RCRD: CPT | Mod: HCNC,S$GLB,, | Performed by: INTERNAL MEDICINE

## 2020-08-06 PROCEDURE — 3074F SYST BP LT 130 MM HG: CPT | Mod: HCNC,CPTII,S$GLB, | Performed by: INTERNAL MEDICINE

## 2020-08-06 PROCEDURE — 3078F DIAST BP <80 MM HG: CPT | Mod: HCNC,CPTII,S$GLB, | Performed by: INTERNAL MEDICINE

## 2020-08-06 PROCEDURE — 1159F PR MEDICATION LIST DOCUMENTED IN MEDICAL RECORD: ICD-10-PCS | Mod: HCNC,S$GLB,, | Performed by: INTERNAL MEDICINE

## 2020-08-06 PROCEDURE — 3078F PR MOST RECENT DIASTOLIC BLOOD PRESSURE < 80 MM HG: ICD-10-PCS | Mod: HCNC,CPTII,S$GLB, | Performed by: INTERNAL MEDICINE

## 2020-08-06 PROCEDURE — 99999 PR PBB SHADOW E&M-EST. PATIENT-LVL IV: CPT | Mod: PBBFAC,HCNC,, | Performed by: INTERNAL MEDICINE

## 2020-08-06 PROCEDURE — 1126F PR PAIN SEVERITY QUANTIFIED, NO PAIN PRESENT: ICD-10-PCS | Mod: HCNC,S$GLB,, | Performed by: INTERNAL MEDICINE

## 2020-08-06 PROCEDURE — 3008F BODY MASS INDEX DOCD: CPT | Mod: HCNC,CPTII,S$GLB, | Performed by: INTERNAL MEDICINE

## 2020-08-06 PROCEDURE — 99214 PR OFFICE/OUTPT VISIT, EST, LEVL IV, 30-39 MIN: ICD-10-PCS | Mod: HCNC,S$GLB,, | Performed by: INTERNAL MEDICINE

## 2020-08-06 PROCEDURE — 1101F PT FALLS ASSESS-DOCD LE1/YR: CPT | Mod: HCNC,CPTII,S$GLB, | Performed by: INTERNAL MEDICINE

## 2020-08-06 PROCEDURE — 99999 PR PBB SHADOW E&M-EST. PATIENT-LVL IV: ICD-10-PCS | Mod: PBBFAC,HCNC,, | Performed by: INTERNAL MEDICINE

## 2020-08-06 PROCEDURE — 3051F PR MOST RECENT HEMOGLOBIN A1C LEVEL 7.0 - < 8.0%: ICD-10-PCS | Mod: HCNC,CPTII,S$GLB, | Performed by: INTERNAL MEDICINE

## 2020-08-06 PROCEDURE — 1126F AMNT PAIN NOTED NONE PRSNT: CPT | Mod: HCNC,S$GLB,, | Performed by: INTERNAL MEDICINE

## 2020-08-06 PROCEDURE — 3074F PR MOST RECENT SYSTOLIC BLOOD PRESSURE < 130 MM HG: ICD-10-PCS | Mod: HCNC,CPTII,S$GLB, | Performed by: INTERNAL MEDICINE

## 2020-08-06 PROCEDURE — 99214 OFFICE O/P EST MOD 30 MIN: CPT | Mod: HCNC,S$GLB,, | Performed by: INTERNAL MEDICINE

## 2020-08-06 PROCEDURE — 1101F PR PT FALLS ASSESS DOC 0-1 FALLS W/OUT INJ PAST YR: ICD-10-PCS | Mod: HCNC,CPTII,S$GLB, | Performed by: INTERNAL MEDICINE

## 2020-08-06 PROCEDURE — 3008F PR BODY MASS INDEX (BMI) DOCUMENTED: ICD-10-PCS | Mod: HCNC,CPTII,S$GLB, | Performed by: INTERNAL MEDICINE

## 2020-08-06 NOTE — PROGRESS NOTES
Cardiology Clinic note    Subjective:   Patient ID:  Vivienne Jose is a 70 y.o. female who presents for CAD FUP    HPI:   CAD: Denies CP, BRIONES. Active with ADLs. Generally not exercising.    HTN: On medications    HLD: Tolerating statin    SH Tobacco Never used  FH Maternal grand parents  in early 60s of MI    Patient Active Problem List    Diagnosis Date Noted    Hypothyroidism 2019    Mild nonproliferative diabetic retinopathy of 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 edema associated with type 2 diabetes mellitus 2019    Decreased range of motion of right knee 2019    Muscle weakness of lower extremity 2019    Impaired gait and mobility 2019    S/P total knee arthroplasty, right 2019    Hematuria 2018     repeat U/A for verification and f/u urogyn who is evaluating       Chronic left-sided low back pain with left-sided sciatica 10/22/2018    Interstitial cystitis 2018    Type 2 diabetes mellitus with stage 3 chronic kidney disease, with long-term current use of insulin 2018    Diverticulosis of intestine without bleeding 2017    Skin nodule 2017    Hypokalemia 2017    Diabetic polyneuropathy associated with type 2 diabetes mellitus 2017    Recurrent UTI 2017    Nephrolithiasis 2016    Gastroesophageal reflux disease 2016    Hyperlipidemia 2016    Chronic pain of right knee 2016    Recurrent HSV (herpes simplex virus) 2016    Hypocalcemia 2016    Morbid obesity with body mass index (BMI) of 40.0 to 44.9 in adult 2016    Osteopenia 2015    Vitamin D deficiency 2015    Sacroiliitis 2015    Facet arthritis of lumbar region 2015     Seen on lumbar MRI dated 06/01/15      Physical deconditioning 2015    BAM (obstructive sleep apnea) 2015    Proteinuria 2015    Carpal tunnel syndrome of right wrist 2014    Nuclear sclerosis -  Both Eyes 04/02/2013    Chronic allergic rhinitis 11/30/2012    CAD (coronary artery disease) 11/23/2012     Cardiology and stress test 12/11      Kidney stones 08/24/2012     Followed by nephrology      Essential hypertension 08/24/2012    Trigger middle finger of right hand 07/30/2012    Bilateral carotid artery disease 07/26/2011     Seen on carotid ultrasound dated 07/26/11      Facet arthritis of cervical region 07/25/2011     Seen on cervical xray dated 07/25/11         Patient's Medications   New Prescriptions    No medications on file   Previous Medications    ALENDRONATE (FOSAMAX) 70 MG TABLET    Take 1 tablet (70 mg total) by mouth every 7 days. Take with a full glass of water & remain upright for at least 30 minutes    AMLODIPINE (NORVASC) 5 MG TABLET    Take 1 tablet (5 mg total) by mouth every evening.    ASPIRIN (ECOTRIN) 81 MG EC TABLET    Take 81 mg by mouth once daily.    AZELASTINE (ASTELIN) 137 MCG (0.1 %) NASAL SPRAY    USE 2 SPRAYS NASALLY TWICE DAILY    BLOOD SUGAR DIAGNOSTIC (TRUE METRIX GLUCOSE TEST STRIP) STRP    TEST TWO TIMES DAILY    BLOOD-GLUCOSE METER MISC    [employer] True Metrix Air meter    CALCIUM CARBONATE-VITAMIN D3 600 MG(1,500MG) -100 UNIT CAP    Take 1 tablet by mouth once daily.    CHLORTHALIDONE (HYGROTEN) 25 MG TAB    Take 1 tablet (25 mg total) by mouth once daily.    CRANBERRY 500 MG CAP    Take by mouth.    FLUTICASONE PROPIONATE (FLONASE) 50 MCG/ACTUATION NASAL SPRAY    USE 2 SPRAYS IN EACH NOSTRIL EVERY DAY    GABAPENTIN (NEURONTIN) 600 MG TABLET    Take 1 tablet (600 mg total) by mouth 2 (two) times daily.    GLIPIZIDE (GLUCOTROL) 5 MG TABLET    Take 1 tablet (5 mg total) by mouth 2 (two) times daily before meals.    GLUCAGON (HUMAN RECOMBINANT) INJ 1MG/ML KIT    Inject 1 mL (1 mg total) into the muscle as needed.    INSULIN DEGLUDEC (TRESIBA FLEXTOUCH U-100) 100 UNIT/ML (3 ML) INPN    Inject 33 Units into the skin every evening.    KETOTIFEN (ZADITOR) 0.025 %  "(0.035 %) OPHTHALMIC SOLUTION    Place 1-2 drops into both eyes once daily.    L.ACID-B.BIFIDUM-B.ANIMAL-FOS (PROBIOTIC COMPLEX) 25 BILLION CELL -100 MG CAP    Take 1 capsule by mouth once daily.    LANCETS (TRUEPLUS LANCETS) 28 GAUGE MISC    Inject 1 lancet into the skin 2 (two) times daily before meals.    LEVOTHYROXINE (SYNTHROID) 75 MCG TABLET    Take 1 tablet (75 mcg total) by mouth once daily.    LORATADINE (CLARITIN) 10 MG TABLET    Take 10 mg by mouth once daily.    LOVASTATIN (MEVACOR) 40 MG TABLET    TAKE 1 TABLET NIGHTLY (SUBSTITUTED FOR MEVACOR)    MAGNESIUM OXIDE-MG AA CHELATE (MAGNESIUM, AMINO ACID CHELATE,) 133 MG TAB    Take by mouth as directed.     METFORMIN (GLUCOPHAGE) 1000 MG TABLET    Take 1 tablet (1,000 mg total) by mouth 2 (two) times daily with meals.    MV-MN/FOLIC ACID/VIT K/OHAF488 (ALIVE ONCE DAILY WOMEN 50 PLUS ORAL)    Take 1 tablet by mouth once daily.    OMEPRAZOLE (PRILOSEC) 20 MG CAPSULE    Take 1 capsule (20 mg total) by mouth daily as needed.    PEN NEEDLE, DIABETIC (BD ULTRA-FINE EDUARDO PEN NEEDLE) 32 GAUGE X 5/32" NDLE    USE AS DIRECTED    POTASSIUM CHLORIDE SA (K-DUR,KLOR-CON) 10 MEQ TABLET    TAKE 1 TABLET EVERY DAY  EXCEPT TAKE 2 TABLETS ON MONDAY, WEDNESDAY AND FRIDAY    PREMARIN VAGINAL CREAM    Place 0.5 g vaginally 3 (three) times a week.    SEMAGLUTIDE (OZEMPIC) 0.25 MG OR 0.5 MG(2 MG/1.5 ML) PNIJ    Inject 0.25 mg into the skin every 7 days. Take 0.25 mg for 4 weeks, then increase to 0.5 mg weekly    VALACYCLOVIR (VALTREX) 500 MG TABLET    Take 1 tablet (500 mg total) by mouth once daily.    VALSARTAN (DIOVAN) 160 MG TABLET    Take 1 tablet (160 mg total) by mouth 2 (two) times daily.   Modified Medications    No medications on file   Discontinued Medications    No medications on file        Review of Systems   Constitution: Negative for fever.   HENT: Negative for nosebleeds.    Cardiovascular: Negative for chest pain, dyspnea on exertion, irregular heartbeat, leg " swelling, near-syncope, orthopnea, palpitations and syncope. PND: Wears CPAP.        As above   Respiratory: Negative for hemoptysis.    Hematologic/Lymphatic: Negative for bleeding problem. Bruises/bleeds easily.   Musculoskeletal: Positive for arthritis.   Gastrointestinal: Negative for hematochezia and melena.   Genitourinary: Negative for hematuria.   Neurological: Negative for seizures.   Allergic/Immunologic: Positive for environmental allergies.         Objective:   Vitals  Vitals:    08/06/20 1344   BP: 121/76   Pulse: 77   SpO2: 97%   Weight: 111 kg (244 lb 11.4 oz)          Physical Exam   Constitutional: She appears well-developed. No distress.   Eyes: Conjunctivae are normal.   Neck: Normal range of motion. No JVD present.   Cardiovascular: Normal rate and regular rhythm. Exam reveals no gallop and no friction rub.   No murmur heard.  Pulmonary/Chest: Effort normal and breath sounds normal. No respiratory distress.   Abdominal: Soft. Bowel sounds are normal. There is no abdominal tenderness.   Musculoskeletal:         General: Edema: Mild LE edema b/l.   Neurological: She is alert.   Skin: Skin is warm. She is not diaphoretic.           Assessment:     1. Coronary artery disease involving native coronary artery of native heart without angina pectoris    2. Hypertension associated with diabetes    3. Dyslipidemia associated with type 2 diabetes mellitus    4. BAM (obstructive sleep apnea)    5. Diabetes mellitus without complication        Plan:   Vivienne Jose is a 70 y.o. female h/o CAD, DM2, HTN, HLD, obesity, BAM    EKG 2/20/19 personally reviewed. My interpretation: NSR 80s. LAFB, RBBB  Echo 2015: LVEF 60-65%. Normal diastology. Normal RVSF.    CAD  Asymptomatic  Cardiac CTA 2007 ([employer] Documents): Codominant system. Foci of significant stenosis in pLAD. Myocardial bridge in RI. Focal stenosis at origin of LCx <50%  Cath 2007 ([employer] Documents): R-dominant. LAD 30%, RCA 20%  PET stress 2017:  "Negative for ischemia (EKG, PET). Normal LVEF  Aspirin, statin  Does not recall trying another statin before. LDL in a good range.    DM  Lab Results   Component Value Date    HGBA1C 7.4 (H) 07/10/2020   Managed by endocrinology at     HTN  BP well controlled  Amlodipine, chlorthalidone, valsartan    HLD  Lab Results   Component Value Date    LDLCALC 68.8 07/10/2020   Statin as above    Obesity  Body mass index is 43.35 kg/m².  Estimated body mass index is 43.35 kg/m² as calculated from the following:    Height as of 7/17/20: 5' 3" (1.6 m).    Weight as of this encounter: 111 kg (244 lb 11.4 oz).  Discussed diet and exercise  Bariatric medicine referral - deferred for now    BAM  On CPAP    Continue with current medical plan and lifestyle changes.    No orders of the defined types were placed in this encounter.      Follow up PRN  Return sooner for concerns or questions. If symptoms persist go to the ED    She expressed verbal understanding and agreed with the plan      Hilda Odonnell MD  Interventional Cardiology  [facility]  Phone: [phone]    "

## 2020-08-07 ENCOUNTER — OFFICE VISIT (OUTPATIENT)
Dept: INTERNAL MEDICINE | Facility: CLINIC | Age: 70
End: 2020-08-07
Payer: MEDICARE

## 2020-08-07 VITALS
DIASTOLIC BLOOD PRESSURE: 64 MMHG | BODY MASS INDEX: 43.11 KG/M2 | SYSTOLIC BLOOD PRESSURE: 118 MMHG | OXYGEN SATURATION: 98 % | TEMPERATURE: 97 F | HEART RATE: 69 BPM | WEIGHT: 243.38 LBS

## 2020-08-07 DIAGNOSIS — E11.69 HYPERLIPIDEMIA ASSOCIATED WITH TYPE 2 DIABETES MELLITUS: ICD-10-CM

## 2020-08-07 DIAGNOSIS — G47.33 OSA (OBSTRUCTIVE SLEEP APNEA): ICD-10-CM

## 2020-08-07 DIAGNOSIS — J01.90 ACUTE BACTERIAL SINUSITIS: ICD-10-CM

## 2020-08-07 DIAGNOSIS — E11.59 HYPERTENSION ASSOCIATED WITH DIABETES: ICD-10-CM

## 2020-08-07 DIAGNOSIS — E03.9 HYPOTHYROIDISM, UNSPECIFIED TYPE: ICD-10-CM

## 2020-08-07 DIAGNOSIS — N18.30 TYPE 2 DIABETES MELLITUS WITH STAGE 3 CHRONIC KIDNEY DISEASE, WITH LONG-TERM CURRENT USE OF INSULIN: ICD-10-CM

## 2020-08-07 DIAGNOSIS — E11.42 DIABETIC POLYNEUROPATHY ASSOCIATED WITH TYPE 2 DIABETES MELLITUS: ICD-10-CM

## 2020-08-07 DIAGNOSIS — I15.2 HYPERTENSION ASSOCIATED WITH DIABETES: ICD-10-CM

## 2020-08-07 DIAGNOSIS — E78.5 HYPERLIPIDEMIA ASSOCIATED WITH TYPE 2 DIABETES MELLITUS: ICD-10-CM

## 2020-08-07 DIAGNOSIS — B96.89 ACUTE BACTERIAL SINUSITIS: ICD-10-CM

## 2020-08-07 DIAGNOSIS — Z79.4 TYPE 2 DIABETES MELLITUS WITH STAGE 3 CHRONIC KIDNEY DISEASE, WITH LONG-TERM CURRENT USE OF INSULIN: ICD-10-CM

## 2020-08-07 DIAGNOSIS — Z12.31 SCREENING MAMMOGRAM, ENCOUNTER FOR: ICD-10-CM

## 2020-08-07 DIAGNOSIS — I25.10 CORONARY ARTERY DISEASE INVOLVING NATIVE CORONARY ARTERY OF NATIVE HEART WITHOUT ANGINA PECTORIS: ICD-10-CM

## 2020-08-07 DIAGNOSIS — E11.22 TYPE 2 DIABETES MELLITUS WITH STAGE 3 CHRONIC KIDNEY DISEASE, WITH LONG-TERM CURRENT USE OF INSULIN: ICD-10-CM

## 2020-08-07 PROCEDURE — 3051F PR MOST RECENT HEMOGLOBIN A1C LEVEL 7.0 - < 8.0%: ICD-10-PCS | Mod: HCNC,CPTII,S$GLB, | Performed by: INTERNAL MEDICINE

## 2020-08-07 PROCEDURE — 99999 PR PBB SHADOW E&M-EST. PATIENT-LVL III: ICD-10-PCS | Mod: PBBFAC,HCNC,, | Performed by: INTERNAL MEDICINE

## 2020-08-07 PROCEDURE — 1159F MED LIST DOCD IN RCRD: CPT | Mod: HCNC,S$GLB,, | Performed by: INTERNAL MEDICINE

## 2020-08-07 PROCEDURE — 99214 PR OFFICE/OUTPT VISIT, EST, LEVL IV, 30-39 MIN: ICD-10-PCS | Mod: HCNC,S$GLB,, | Performed by: INTERNAL MEDICINE

## 2020-08-07 PROCEDURE — 3008F PR BODY MASS INDEX (BMI) DOCUMENTED: ICD-10-PCS | Mod: HCNC,CPTII,S$GLB, | Performed by: INTERNAL MEDICINE

## 2020-08-07 PROCEDURE — 99214 OFFICE O/P EST MOD 30 MIN: CPT | Mod: HCNC,S$GLB,, | Performed by: INTERNAL MEDICINE

## 2020-08-07 PROCEDURE — 1125F PR PAIN SEVERITY QUANTIFIED, PAIN PRESENT: ICD-10-PCS | Mod: HCNC,S$GLB,, | Performed by: INTERNAL MEDICINE

## 2020-08-07 PROCEDURE — 1159F PR MEDICATION LIST DOCUMENTED IN MEDICAL RECORD: ICD-10-PCS | Mod: HCNC,S$GLB,, | Performed by: INTERNAL MEDICINE

## 2020-08-07 PROCEDURE — 3074F PR MOST RECENT SYSTOLIC BLOOD PRESSURE < 130 MM HG: ICD-10-PCS | Mod: HCNC,CPTII,S$GLB, | Performed by: INTERNAL MEDICINE

## 2020-08-07 PROCEDURE — 1125F AMNT PAIN NOTED PAIN PRSNT: CPT | Mod: HCNC,S$GLB,, | Performed by: INTERNAL MEDICINE

## 2020-08-07 PROCEDURE — 3074F SYST BP LT 130 MM HG: CPT | Mod: HCNC,CPTII,S$GLB, | Performed by: INTERNAL MEDICINE

## 2020-08-07 PROCEDURE — 3008F BODY MASS INDEX DOCD: CPT | Mod: HCNC,CPTII,S$GLB, | Performed by: INTERNAL MEDICINE

## 2020-08-07 PROCEDURE — 99999 PR PBB SHADOW E&M-EST. PATIENT-LVL III: CPT | Mod: PBBFAC,HCNC,, | Performed by: INTERNAL MEDICINE

## 2020-08-07 PROCEDURE — 1101F PT FALLS ASSESS-DOCD LE1/YR: CPT | Mod: HCNC,CPTII,S$GLB, | Performed by: INTERNAL MEDICINE

## 2020-08-07 PROCEDURE — 3078F PR MOST RECENT DIASTOLIC BLOOD PRESSURE < 80 MM HG: ICD-10-PCS | Mod: HCNC,CPTII,S$GLB, | Performed by: INTERNAL MEDICINE

## 2020-08-07 PROCEDURE — 3078F DIAST BP <80 MM HG: CPT | Mod: HCNC,CPTII,S$GLB, | Performed by: INTERNAL MEDICINE

## 2020-08-07 PROCEDURE — 3051F HG A1C>EQUAL 7.0%<8.0%: CPT | Mod: HCNC,CPTII,S$GLB, | Performed by: INTERNAL MEDICINE

## 2020-08-07 PROCEDURE — 1101F PR PT FALLS ASSESS DOC 0-1 FALLS W/OUT INJ PAST YR: ICD-10-PCS | Mod: HCNC,CPTII,S$GLB, | Performed by: INTERNAL MEDICINE

## 2020-08-07 RX ORDER — CEPHALEXIN 500 MG/1
500 CAPSULE ORAL EVERY 12 HOURS
Qty: 14 CAPSULE | Refills: 0 | Status: SHIPPED | OUTPATIENT
Start: 2020-08-07 | End: 2020-08-14

## 2020-08-07 NOTE — PROGRESS NOTES
Patient ID: Vivienne Jose is a 70 y.o. female.    Chief Complaint: Follow-up    HPI Vivienne is a 70 y.o. female with BAM, type 2 diabetes, hypertension, hyperlipidemia, hypothyroidism, recurrent urinary tract infections, and CAD who presents for routine follow-up of medical conditions.  She also complains today of pain.  Pain is located in the area of the right ear and right side job.  It is associated with postnasal drip, sore throat, and cough.  Onset was 4-6 weeks ago.  It is a constant problem in duration the severity waxes and wanes.  She is unsure if she might have a urinary tract infection.  States she has some pain in her back but this might be due to arthritis.  We reviewed all her recent labs.  She follows with endocrinology for treatment of type 2 diabetes.  She is currently taking glipizide 5 mg twice daily, Tresiba 33 units nightly, metformin 1000 mg p.o. b.i.d., and Ozempic 0.25 mg every 7 days.  Her last A1c was 7.4.  She has been following with Charity Robledo in Endocrinology. Will follow up with another endocrinologist in the fall as Charity is leaving [facility].  We reviewed her last lipid profile from July 10th.  Cholesterol is at goal.  Patient is compliant with lovastatin 40 mg p.o. daily.  For treatment of hypothyroidism, she is taking Synthroid 75 mcg daily.  TSH normal on July 10th.  She follows with Dr. Cee in urology and we reviewed her last urine culture which grew E coli and was pansensitive.  She recently saw her cardiologist who states she does not need to follow-up with cardiology anymore.  Recently saw Sleep Medicine for cPAP supplies for treatment of BAM.  All health maintenance reviewed and updated in the chart.  Patient due for screening mammogram soon.    Review of Systems   HENT: Positive for ear discharge, rhinorrhea and sore throat.    Respiratory: Positive for cough.    All other systems reviewed and are negative.      Objective:     Vitals:    08/07/20 0831   BP: 118/64   Pulse:  69   Temp: 97.1 °F (36.2 °C)        Physical Exam  Vitals signs reviewed.   Constitutional:       General: She is not in acute distress.     Appearance: Normal appearance. She is well-developed. She is obese. She is not ill-appearing or diaphoretic.   HENT:      Head: Normocephalic and atraumatic.      Right Ear: External ear normal. A middle ear effusion is present.      Left Ear: External ear normal.      Ears:      Comments: Erythema of right auditory canal.     Nose: Nose normal.   Eyes:      General: No scleral icterus.        Right eye: No discharge.         Left eye: No discharge.      Extraocular Movements: Extraocular movements intact.      Conjunctiva/sclera: Conjunctivae normal.   Cardiovascular:      Rate and Rhythm: Normal rate and regular rhythm.      Heart sounds: Normal heart sounds. No murmur. No friction rub. No gallop.    Pulmonary:      Effort: Pulmonary effort is normal. No respiratory distress.      Breath sounds: Normal breath sounds. No stridor. No wheezing, rhonchi or rales.   Skin:     General: Skin is warm and dry.   Neurological:      General: No focal deficit present.      Mental Status: She is alert and oriented to person, place, and time. Mental status is at baseline.   Psychiatric:         Mood and Affect: Mood normal.         Behavior: Behavior normal.         Thought Content: Thought content normal.         Judgment: Judgment normal.         Assessment:       1. Acute bacterial sinusitis Active   2. Screening mammogram, encounter for    3. Diabetic polyneuropathy associated with type 2 diabetes mellitus Well controlled   4. Coronary artery disease involving native coronary artery of native heart without angina pectoris Chronic   5. Type 2 diabetes mellitus with stage 3 chronic kidney disease, with long-term current use of insulin Well controlled   6. Hyperlipidemia associated with type 2 diabetes mellitus Well controlled   7. Hypertension associated with diabetes Well controlled    8. Hypothyroidism, unspecified type Well controlled   9. BAM (obstructive sleep apnea) Chronic       Plan:         Acute bacterial sinusitis  -     cephALEXin (KEFLEX) 500 MG capsule; Take 1 capsule (500 mg total) by mouth every 12 (twelve) hours. for 7 days  Dispense: 14 capsule; Refill: 0    Screening mammogram, encounter for  -     Mammo Digital Screening Bilat; Future; Expected date: 08/07/2020    Diabetic polyneuropathy associated with type 2 diabetes mellitus  Comments:  Continue current medications.  Follow-up with Endocrinology.    Coronary artery disease involving native coronary artery of native heart without angina pectoris  Comments:  Continue current meds    Type 2 diabetes mellitus with stage 3 chronic kidney disease, with long-term current use of insulin  Comments:  Continue current medications and follow-up with Endocrinology.    Hyperlipidemia associated with type 2 diabetes mellitus  Comments:  Continue current medication.    Hypertension associated with diabetes  Comments:  Continue current medication    Hypothyroidism, unspecified type  Comments:  Continue current medication    BAM (obstructive sleep apnea)  Comments:  Continue CPAP.  Follow-up with sleep medicine        RTC 6 months     Warning signs discussed, patient to call with any further issues or worsening of symptoms.       Parts of the above note were dictated using a voice dictation software. Please excuse any grammatical or typographical errors.

## 2020-08-21 ENCOUNTER — CLINICAL SUPPORT (OUTPATIENT)
Dept: INTERNAL MEDICINE | Facility: CLINIC | Age: 70
End: 2020-08-21
Payer: MEDICARE

## 2020-08-21 DIAGNOSIS — J30.9 CHRONIC ALLERGIC RHINITIS: ICD-10-CM

## 2020-08-21 PROCEDURE — 99499 NO LOS: ICD-10-PCS | Mod: HCNC,S$GLB,, | Performed by: ALLERGY & IMMUNOLOGY

## 2020-08-21 PROCEDURE — 99499 UNLISTED E&M SERVICE: CPT | Mod: HCNC,S$GLB,, | Performed by: ALLERGY & IMMUNOLOGY

## 2020-08-21 PROCEDURE — 95115 PR IMMUNOTHERAPY, ONE INJECTION: ICD-10-PCS | Mod: HCNC,S$GLB,, | Performed by: FAMILY MEDICINE

## 2020-08-21 PROCEDURE — 99999 PR PBB SHADOW E&M-EST. PATIENT-LVL I: ICD-10-PCS | Mod: PBBFAC,HCNC,,

## 2020-08-21 PROCEDURE — 95115 IMMUNOTHERAPY ONE INJECTION: CPT | Mod: HCNC,S$GLB,, | Performed by: FAMILY MEDICINE

## 2020-08-21 PROCEDURE — 99999 PR PBB SHADOW E&M-EST. PATIENT-LVL I: CPT | Mod: PBBFAC,HCNC,,

## 2020-08-25 ENCOUNTER — PATIENT OUTREACH (OUTPATIENT)
Dept: OTHER | Facility: OTHER | Age: 70
End: 2020-08-25

## 2020-08-31 PROCEDURE — 99454 PR REMOTE MNTR, PHYS PARAM, INITIAL, EA 30 DAYS: ICD-10-PCS | Mod: S$GLB,,, | Performed by: INTERNAL MEDICINE

## 2020-08-31 PROCEDURE — 99454 REM MNTR PHYSIOL PARAM 16-30: CPT | Mod: S$GLB,,, | Performed by: INTERNAL MEDICINE

## 2020-09-21 NOTE — PROGRESS NOTES
Digital Medicine: Health  Follow-Up    The history is provided by the patient.             Reason for review: Blood glucose not at goal and Blood pressure at goal  Care Team received low BG alert.        Additional Follow-up details: Mrs. Jose is doing okay. She and her  are traveling the [place]. She attributes travel to lack of BP monitoring. However, she is continuing to check BG often. She attributes low on 9/13 (0) to error. Will delete erroneous reading.    Encouraged monitoring. Will continue to follow up. She thanked me for calling today.           Diet-Not assessed          Physical Activity-Not assessed    Medication Adherence-Medication Adherence not addressed.      Substance, Sleep, Stress-Not assessed      Additional monitoring needed.       Addressed patient questions and patient has my contact information if needed prior to next outreach. Patient verbalizes understanding.      Explained the importance of self-monitoring and medication adherence. Encouraged the patient to communicate with their health  for lifestyle modifications to help improve or maintain a healthy lifestyle.            There are no preventive care reminders to display for this patient.    Last 5 Patient Entered Readings                                      Current 30 Day Average: 132/62     Recent Readings 9/12/2020 9/8/2020 8/30/2020 8/25/2020 8/18/2020    SBP (mmHg) 133 124 130 140 131    DBP (mmHg) 65 54 63 66 60    Pulse 69 63 63 65 67        Last 6 Patient Entered Readings                                          Most Recent A1c: 7.4% on 7/10/2020  (Goal: 8%)     Recent Readings 9/21/2020 9/20/2020 9/20/2020 9/19/2020 9/19/2020    Blood Glucose (mg/dL) 168 148 145 251 136

## 2020-09-22 ENCOUNTER — PATIENT MESSAGE (OUTPATIENT)
Dept: UROLOGY | Facility: CLINIC | Age: 70
End: 2020-09-22

## 2020-10-02 ENCOUNTER — CLINICAL SUPPORT (OUTPATIENT)
Dept: INTERNAL MEDICINE | Facility: CLINIC | Age: 70
End: 2020-10-02
Payer: MEDICARE

## 2020-10-02 DIAGNOSIS — J30.9 CHRONIC ALLERGIC RHINITIS: ICD-10-CM

## 2020-10-02 PROCEDURE — 95115 PR IMMUNOTHERAPY, ONE INJECTION: ICD-10-PCS | Mod: HCNC,S$GLB,, | Performed by: FAMILY MEDICINE

## 2020-10-02 PROCEDURE — 99499 UNLISTED E&M SERVICE: CPT | Mod: HCNC,S$GLB,, | Performed by: ALLERGY & IMMUNOLOGY

## 2020-10-02 PROCEDURE — 95115 IMMUNOTHERAPY ONE INJECTION: CPT | Mod: HCNC,S$GLB,, | Performed by: FAMILY MEDICINE

## 2020-10-02 PROCEDURE — 99499 NO LOS: ICD-10-PCS | Mod: HCNC,S$GLB,, | Performed by: ALLERGY & IMMUNOLOGY

## 2020-10-02 PROCEDURE — 99999 PR PBB SHADOW E&M-EST. PATIENT-LVL I: CPT | Mod: PBBFAC,HCNC,,

## 2020-10-02 PROCEDURE — 99999 PR PBB SHADOW E&M-EST. PATIENT-LVL I: ICD-10-PCS | Mod: PBBFAC,HCNC,,

## 2020-10-03 ENCOUNTER — PATIENT MESSAGE (OUTPATIENT)
Dept: UROLOGY | Facility: CLINIC | Age: 70
End: 2020-10-03

## 2020-10-05 ENCOUNTER — HOSPITAL ENCOUNTER (OUTPATIENT)
Dept: RADIOLOGY | Facility: HOSPITAL | Age: 70
Discharge: HOME | End: 2020-10-05
Attending: INTERNAL MEDICINE
Payer: MEDICARE

## 2020-10-05 DIAGNOSIS — Z12.31 SCREENING MAMMOGRAM, ENCOUNTER FOR: ICD-10-CM

## 2020-10-05 PROCEDURE — 77067 SCR MAMMO BI INCL CAD: CPT | Mod: 26,HCNC,, | Performed by: RADIOLOGY

## 2020-10-05 PROCEDURE — 77063 MAMMO DIGITAL SCREENING BILAT WITH TOMO: ICD-10-PCS | Mod: 26,HCNC,, | Performed by: RADIOLOGY

## 2020-10-05 PROCEDURE — 77067 MAMMO DIGITAL SCREENING BILAT WITH TOMO: ICD-10-PCS | Mod: 26,HCNC,, | Performed by: RADIOLOGY

## 2020-10-05 PROCEDURE — 77063 BREAST TOMOSYNTHESIS BI: CPT | Mod: 26,HCNC,, | Performed by: RADIOLOGY

## 2020-10-05 PROCEDURE — 77067 SCR MAMMO BI INCL CAD: CPT | Mod: TC,HCNC,PO

## 2020-10-14 DIAGNOSIS — N20.0 KIDNEY STONES: Primary | ICD-10-CM

## 2020-10-16 ENCOUNTER — PATIENT MESSAGE (OUTPATIENT)
Dept: UROLOGY | Facility: CLINIC | Age: 70
End: 2020-10-16

## 2020-10-20 ENCOUNTER — PATIENT MESSAGE (OUTPATIENT)
Dept: ADMINISTRATIVE | Facility: OTHER | Age: 70
End: 2020-10-20

## 2020-10-21 ENCOUNTER — OFFICE VISIT (OUTPATIENT)
Dept: UROLOGY | Facility: CLINIC | Age: 70
End: 2020-10-21
Payer: MEDICARE

## 2020-10-21 ENCOUNTER — PATIENT MESSAGE (OUTPATIENT)
Dept: UROLOGY | Facility: CLINIC | Age: 70
End: 2020-10-21

## 2020-10-21 DIAGNOSIS — R30.0 DYSURIA: ICD-10-CM

## 2020-10-21 DIAGNOSIS — R35.0 FREQUENCY OF URINATION: ICD-10-CM

## 2020-10-21 DIAGNOSIS — N39.0 RECURRENT UTI: Primary | ICD-10-CM

## 2020-10-21 DIAGNOSIS — R39.15 URGENCY OF URINATION: ICD-10-CM

## 2020-10-21 PROCEDURE — 1159F PR MEDICATION LIST DOCUMENTED IN MEDICAL RECORD: ICD-10-PCS | Mod: HCNC,95,, | Performed by: PHYSICIAN ASSISTANT

## 2020-10-21 PROCEDURE — 99214 OFFICE O/P EST MOD 30 MIN: CPT | Mod: HCNC,95,, | Performed by: PHYSICIAN ASSISTANT

## 2020-10-21 PROCEDURE — 1101F PR PT FALLS ASSESS DOC 0-1 FALLS W/OUT INJ PAST YR: ICD-10-PCS | Mod: HCNC,CPTII,95, | Performed by: PHYSICIAN ASSISTANT

## 2020-10-21 PROCEDURE — 1101F PT FALLS ASSESS-DOCD LE1/YR: CPT | Mod: HCNC,CPTII,95, | Performed by: PHYSICIAN ASSISTANT

## 2020-10-21 PROCEDURE — 1159F MED LIST DOCD IN RCRD: CPT | Mod: HCNC,95,, | Performed by: PHYSICIAN ASSISTANT

## 2020-10-21 PROCEDURE — 99214 PR OFFICE/OUTPT VISIT, EST, LEVL IV, 30-39 MIN: ICD-10-PCS | Mod: HCNC,95,, | Performed by: PHYSICIAN ASSISTANT

## 2020-10-21 NOTE — PROGRESS NOTES
The patient location is: [place]  The chief complaint leading to consultation is: UTI    Visit type: audiovisual    Face to Face time with patient: 10  20 minutes of total time spent on the encounter, which includes face to face time and non-face to face time preparing to see the patient (eg, review of tests), Obtaining and/or reviewing separately obtained history, Documenting clinical information in the electronic or other health record, Independently interpreting results (not separately reported) and communicating results to the patient/family/caregiver, or Care coordination (not separately reported).         Each patient to whom he or she provides medical services by telemedicine is:  (1) informed of the relationship between the physician and patient and the respective role of any other health care provider with respect to management of the patient; and (2) notified that he or she may decline to receive medical services by telemedicine and may withdraw from such care at any time.      CHIEF COMPLAINT:    Mrs. Jose is a 70 y.o. female presenting for UTI.  PRESENTING ILLNESS:    Vivienne Jose is a 70 y.o. female with a PMH of recurrent UTI who presents for UTI.     Her symptoms started yesterday.  She reports urgency, back ache, fatigue, frequency and  pulling sensation when she voids .  She has not measured her temperature but does not feel like she has fever. .      No gross hematuria.  Sunday or Monday she experienced malodorous urine but not with every void.     She has not taken anything for her symptoms.  She usually takes azo but didn't want to start it prior to submitting a urine specimen.    She has a lifetime history of recurrent UTI (since age 3).  She continues taking or using Ellura, Premarin vaginal cream, probiotic. Has been tried on methenamine in the past but discontinued it due to ineffectiveness.    REVIEW OF SYSTEMS:  Constitutional: Negative for fever and chills.   HENT: Negative for hearing  loss.   Eyes: Negative for visual disturbance.   Respiratory: Negative for shortness of breath.   Cardiovascular: Negative for chest pain.   Gastrointestinal: Negative for vomiting, and constipation.   Genitourinary:  See HPI  Neurological: Negative for dizziness.   Hematological: Does not bruise/bleed easily.   Psychiatric/Behavioral: Negative for confusion.     PATIENT HISTORY:    Past Medical History:   Diagnosis Date    Allergy     Angio-edema     Arthritis     Cataract     bilateral - not removed    Cerebrovascular malformation     Colon polyps     Coronary artery disease     Diabetes mellitus, type 2     Diabetic peripheral neuropathy     GERD (gastroesophageal reflux disease)     Herpes infection     Hyperlipidemia     Hypertension     Hypothyroidism     Kidney stones     Mild nonproliferative diabetic retinopathy of 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 edema associated with type 2 diabetes mellitus 4/18/2019    Nausea & vomiting 11/23/2015    Obesity, morbid     Ovarian cyst     Pyelonephritis, chronic     Seizure disorder, focal motor     Sleep apnea     Type II or unspecified type diabetes mellitus with neurological manifestations, uncontrolled(250.62)     Urinary tract infection     Urticaria     Vaginal infection        Past Surgical History:   Procedure Laterality Date    CARPAL TUNNEL RELEASE Right 2014    COLONOSCOPY      COLONOSCOPY N/A 9/13/2017    Procedure: COLONOSCOPY Golytely;  Surgeon: Gisela Wall MD;  Location: [facility] ENDO;  Service: Endoscopy;  Laterality: N/A;    CYST REMOVAL      skin; multiples    EXTRACORPOREAL SHOCK WAVE LITHOTRIPSY  2002    HYSTERECTOMY  1984    JOINT REPLACEMENT Right 03/06/2019    knee    KIDNEY STONE SURGERY      KNEE ARTHROPLASTY Right 3/6/2019    Procedure: ARTHROPLASTY, KNEE;  Surgeon: Pj Gamboa MD;  Location: [facility] OR;  Service: Orthopedics;  Laterality: Right;  Depuy (Stephen notified 2/21, CC)    THYROIDECTOMY  1977          TONSILLECTOMY, ADENOIDECTOMY      TRIGGER FINGER RELEASE      TUBAL LIGATION         Family History   Problem Relation Age of Onset    Cancer Father     Arthritis Father     Gout Father     Allergies Son     Allergic rhinitis Son     Skin cancer Mother     Macular degeneration Mother     Dementia Mother     Hypertension Mother     No Known Problems Daughter     Diabetes Daughter     Hypertension Daughter     No Known Problems Daughter     Glaucoma Maternal Aunt         Great Maternal Aunt    Leukemia Maternal Uncle     Amblyopia Neg Hx     Cataracts Neg Hx     Retinal detachment Neg Hx     Strabismus Neg Hx     Stroke Neg Hx     Thyroid disease Neg Hx     Kidney disease Neg Hx     Angioedema Neg Hx     Asthma Neg Hx     Atopy Neg Hx     Eczema Neg Hx     Immunodeficiency Neg Hx     Rhinitis Neg Hx     Urticaria Neg Hx        Social History     Socioeconomic History    Marital status:      Spouse name: Not on file    Number of children: Not on file    Years of education: Not on file    Highest education level: Not on file   Occupational History    Occupation: retired     Employer: [employer]   Social Needs    Financial resource strain: Not hard at all    Food insecurity     Worry: Never true     Inability: Never true    Transportation needs     Medical: No     Non-medical: No   Tobacco Use    Smoking status: Never Smoker    Smokeless tobacco: Never Used   Substance and Sexual Activity    Alcohol use: No     Alcohol/week: 0.0 standard drinks     Frequency: Never     Drinks per session: Patient refused     Binge frequency: Never    Drug use: No    Sexual activity: Yes     Partners: Male   Lifestyle    Physical activity     Days per week: 0 days     Minutes per session: 0 min    Stress: Not at all   Relationships    Social connections     Talks on phone: Once a week     Gets together: Never     Attends Protestant service: Not on file     Active member  of club or organization: No     Attends meetings of clubs or organizations: Never     Relationship status:    Other Topics Concern    Are you pregnant or think you may be? Not Asked    Breast-feeding Not Asked   Social History Narrative    Not on file       Allergies:  Sulfa (sulfonamide antibiotics), Ciprofloxacin, Januvia [sitagliptin], Lisinopril, Lotensin [benazepril], and Nexium [esomeprazole magnesium]    Medications:    Current Outpatient Medications:     amLODIPine (NORVASC) 5 MG tablet, Take 1 tablet (5 mg total) by mouth every evening., Disp: 90 tablet, Rfl: 3    aspirin (ECOTRIN) 81 MG EC tablet, Take 81 mg by mouth once daily., Disp: , Rfl:     azelastine (ASTELIN) 137 mcg (0.1 %) nasal spray, USE 2 SPRAYS NASALLY TWICE DAILY, Disp: 120 mL, Rfl: 11    blood sugar diagnostic (TRUE METRIX GLUCOSE TEST STRIP) Strp, TEST TWO TIMES DAILY, Disp: 200 strip, Rfl: 6    blood-glucose meter Misc, [employer] True Metrix Air meter, Disp: 1 each, Rfl: 0    calcium carbonate-vitamin D3 600 mg(1,500mg) -100 unit Cap, Take 1 tablet by mouth once daily., Disp: , Rfl:     chlorthalidone (HYGROTEN) 25 MG Tab, Take 1 tablet (25 mg total) by mouth once daily., Disp: 90 tablet, Rfl: 3    cranberry 500 mg Cap, Take by mouth., Disp: , Rfl:     fluticasone propionate (FLONASE) 50 mcg/actuation nasal spray, USE 2 SPRAYS IN EACH NOSTRIL EVERY DAY, Disp: 48 g, Rfl: 4    gabapentin (NEURONTIN) 600 MG tablet, Take 1 tablet (600 mg total) by mouth 2 (two) times daily., Disp: 180 tablet, Rfl: 3    glipiZIDE (GLUCOTROL) 5 MG tablet, Take 1 tablet (5 mg total) by mouth 2 (two) times daily before meals., Disp: 180 tablet, Rfl: 3    glucagon (human recombinant) inj 1mg/mL kit, Inject 1 mL (1 mg total) into the muscle as needed. (Patient not taking: Reported on 6/26/2020), Disp: 1 kit, Rfl: 6    ketotifen (ZADITOR) 0.025 % (0.035 %) ophthalmic solution, Place 1-2 drops into both eyes once daily., Disp: , Rfl:      "L.acid-B.bifidum-B.animal-FOS (PROBIOTIC COMPLEX) 25 billion cell -100 mg Cap, Take 1 capsule by mouth once daily., Disp: , Rfl:     lancets (TRUEPLUS LANCETS) 28 gauge Misc, Inject 1 lancet into the skin 2 (two) times daily before meals., Disp: 200 each, Rfl: 6    levothyroxine (SYNTHROID) 75 MCG tablet, Take 1 tablet (75 mcg total) by mouth once daily., Disp: 90 tablet, Rfl: 3    loratadine (CLARITIN) 10 mg tablet, Take 10 mg by mouth once daily., Disp: , Rfl:     lovastatin (MEVACOR) 40 MG tablet, TAKE 1 TABLET NIGHTLY (SUBSTITUTED FOR MEVACOR), Disp: 90 tablet, Rfl: 3    magnesium oxide-Mg AA chelate (MAGNESIUM, AMINO ACID CHELATE,) 133 mg Tab, Take by mouth as directed. , Disp: , Rfl:     metFORMIN (GLUCOPHAGE) 1000 MG tablet, Take 1 tablet (1,000 mg total) by mouth 2 (two) times daily with meals., Disp: 180 tablet, Rfl: 3    mv-mn/folic acid/vit [id number] (ALIVE ONCE DAILY WOMEN 50 PLUS ORAL), Take 1 tablet by mouth once daily., Disp: , Rfl:     omeprazole (PRILOSEC) 20 MG capsule, Take 1 capsule (20 mg total) by mouth daily as needed., Disp: 90 capsule, Rfl: 1    pen needle, diabetic (BD ULTRA-FINE EDUARDO PEN NEEDLE) 32 gauge x 5/32" Ndle, USE AS DIRECTED, Disp: 200 each, Rfl: 6    potassium chloride SA (K-DUR,KLOR-CON) 10 MEQ tablet, TAKE 1 TABLET EVERY DAY  EXCEPT TAKE 2 TABLETS ON MONDAY, WEDNESDAY AND FRIDAY, Disp: 130 tablet, Rfl: 1    PREMARIN vaginal cream, Place 0.5 g vaginally 3 (three) times a week., Disp: 30 g, Rfl: 0    semaglutide (OZEMPIC) 0.25 mg or 0.5 mg(2 mg/1.5 mL) PnIj, Inject 0.25 mg into the skin every 7 days. Take 0.25 mg for 4 weeks, then increase to 0.5 mg weekly, Disp: 6 Syringe, Rfl: 4    TRESIBA FLEXTOUCH U-100 100 unit/mL (3 mL) InPn, INJECT 25 UNITS SUBCUTANEOUSLY EVERY EVENING., Disp: 30 mL, Rfl: 3    valACYclovir (VALTREX) 500 MG tablet, Take 1 tablet (500 mg total) by mouth once daily., Disp: 90 tablet, Rfl: 3    valsartan (DIOVAN) 160 MG tablet, Take 1 tablet " (160 mg total) by mouth 2 (two) times daily., Disp: 180 tablet, Rfl: 3    PHYSICAL EXAMINATION:    Constitutional:   She is in no apparent distress.    Eyes: No scleral icterus noted bilaterally. No discharge bilaterally.    Nose: No rhinorrhea    Pulmonary/Chest: Effort normal. No respiratory distress.     Neurological: She is alert and oriented to person, place, and time.     Psych: Cooperative with normal affect.    Physical Exam      LABS:    IMPRESSION:    Encounter Diagnoses   Name Primary?    Recurrent UTI Yes    Dysuria     Frequency of urination     Urgency of urination        PLAN:  U/a and urine culture today.   Will start antibiotics today if u/a is suggestive of an infection.   Continue premarin and ellura.   May take azo as directed.      Eryn Lundberg PA-C

## 2020-10-22 ENCOUNTER — PATIENT MESSAGE (OUTPATIENT)
Dept: UROLOGY | Facility: CLINIC | Age: 70
End: 2020-10-22

## 2020-10-22 DIAGNOSIS — R39.9 UTI SYMPTOMS: Primary | ICD-10-CM

## 2020-10-22 RX ORDER — DOXYCYCLINE 100 MG/1
100 CAPSULE ORAL EVERY 12 HOURS
Qty: 14 CAPSULE | Refills: 0 | Status: SHIPPED | OUTPATIENT
Start: 2020-10-22 | End: 2020-10-29

## 2020-10-22 NOTE — PROGRESS NOTES
I informed patient of her u/a results.  U/a nitrite negative.  Microscopic u/a + for blood, wbc.  Only rare bacteria.  Patient states that her symptoms seemed to be getting worse.  I will send doxy to her pharmacy and follow up on culture.

## 2020-10-23 ENCOUNTER — CLINICAL SUPPORT (OUTPATIENT)
Dept: INTERNAL MEDICINE | Facility: CLINIC | Age: 70
End: 2020-10-23
Payer: MEDICARE

## 2020-10-23 DIAGNOSIS — J30.9 CHRONIC ALLERGIC RHINITIS: ICD-10-CM

## 2020-10-23 PROCEDURE — 99499 UNLISTED E&M SERVICE: CPT | Mod: HCNC,S$GLB,, | Performed by: ALLERGY & IMMUNOLOGY

## 2020-10-23 PROCEDURE — 99999 PR PBB SHADOW E&M-EST. PATIENT-LVL I: CPT | Mod: PBBFAC,HCNC,,

## 2020-10-23 PROCEDURE — 99999 PR PBB SHADOW E&M-EST. PATIENT-LVL I: ICD-10-PCS | Mod: PBBFAC,HCNC,,

## 2020-10-23 PROCEDURE — 99499 NO LOS: ICD-10-PCS | Mod: HCNC,S$GLB,, | Performed by: ALLERGY & IMMUNOLOGY

## 2020-10-23 PROCEDURE — 95115 PR IMMUNOTHERAPY, ONE INJECTION: ICD-10-PCS | Mod: HCNC,S$GLB,, | Performed by: FAMILY MEDICINE

## 2020-10-23 PROCEDURE — 95115 IMMUNOTHERAPY ONE INJECTION: CPT | Mod: HCNC,S$GLB,, | Performed by: FAMILY MEDICINE

## 2020-10-24 ENCOUNTER — IMMUNIZATION (OUTPATIENT)
Dept: INTERNAL MEDICINE | Facility: CLINIC | Age: 70
End: 2020-10-24
Payer: MEDICARE

## 2020-10-24 PROCEDURE — G0008 FLU VACCINE - QUADRIVALENT - ADJUVANTED: ICD-10-PCS | Mod: HCNC,S$GLB,, | Performed by: INTERNAL MEDICINE

## 2020-10-24 PROCEDURE — 90694 VACC AIIV4 NO PRSRV 0.5ML IM: CPT | Mod: HCNC,S$GLB,, | Performed by: INTERNAL MEDICINE

## 2020-10-24 PROCEDURE — G0008 ADMIN INFLUENZA VIRUS VAC: HCPCS | Mod: HCNC,S$GLB,, | Performed by: INTERNAL MEDICINE

## 2020-10-24 PROCEDURE — 90694 FLU VACCINE - QUADRIVALENT - ADJUVANTED: ICD-10-PCS | Mod: HCNC,S$GLB,, | Performed by: INTERNAL MEDICINE

## 2020-11-06 ENCOUNTER — LAB VISIT (OUTPATIENT)
Dept: LAB | Facility: HOSPITAL | Age: 70
End: 2020-11-06
Attending: INTERNAL MEDICINE
Payer: MEDICARE

## 2020-11-06 DIAGNOSIS — N20.0 KIDNEY STONES: ICD-10-CM

## 2020-11-06 DIAGNOSIS — Z79.4 TYPE 2 DIABETES MELLITUS WITH STAGE 3 CHRONIC KIDNEY DISEASE, WITH LONG-TERM CURRENT USE OF INSULIN: ICD-10-CM

## 2020-11-06 DIAGNOSIS — N18.30 TYPE 2 DIABETES MELLITUS WITH STAGE 3 CHRONIC KIDNEY DISEASE, WITH LONG-TERM CURRENT USE OF INSULIN: ICD-10-CM

## 2020-11-06 DIAGNOSIS — E11.22 TYPE 2 DIABETES MELLITUS WITH STAGE 3 CHRONIC KIDNEY DISEASE, WITH LONG-TERM CURRENT USE OF INSULIN: ICD-10-CM

## 2020-11-06 DIAGNOSIS — E03.9 HYPOTHYROIDISM, UNSPECIFIED TYPE: ICD-10-CM

## 2020-11-06 LAB
ALBUMIN SERPL BCP-MCNC: 3.4 G/DL (ref 3.5–5.2)
ALP SERPL-CCNC: 80 U/L (ref 55–135)
ALT SERPL W/O P-5'-P-CCNC: 52 U/L (ref 10–44)
ANION GAP SERPL CALC-SCNC: 12 MMOL/L (ref 8–16)
AST SERPL-CCNC: 62 U/L (ref 10–40)
BASOPHILS # BLD AUTO: 0.04 K/UL (ref 0–0.2)
BASOPHILS NFR BLD: 0.4 % (ref 0–1.9)
BILIRUB SERPL-MCNC: 0.5 MG/DL (ref 0.1–1)
BUN SERPL-MCNC: 12 MG/DL (ref 8–23)
CALCIUM SERPL-MCNC: 9.5 MG/DL (ref 8.7–10.5)
CHLORIDE SERPL-SCNC: 99 MMOL/L (ref 95–110)
CO2 SERPL-SCNC: 30 MMOL/L (ref 23–29)
CREAT SERPL-MCNC: 0.8 MG/DL (ref 0.5–1.4)
DIFFERENTIAL METHOD: ABNORMAL
EOSINOPHIL # BLD AUTO: 0.3 K/UL (ref 0–0.5)
EOSINOPHIL NFR BLD: 2.7 % (ref 0–8)
ERYTHROCYTE [DISTWIDTH] IN BLOOD BY AUTOMATED COUNT: 14.6 % (ref 11.5–14.5)
EST. GFR  (AFRICAN AMERICAN): >60 ML/MIN/1.73 M^2
EST. GFR  (NON AFRICAN AMERICAN): >60 ML/MIN/1.73 M^2
ESTIMATED AVG GLUCOSE: 169 MG/DL (ref 68–131)
GLUCOSE SERPL-MCNC: 138 MG/DL (ref 70–110)
HBA1C MFR BLD HPLC: 7.5 % (ref 4–5.6)
HCT VFR BLD AUTO: 43.9 % (ref 37–48.5)
HGB BLD-MCNC: 13.8 G/DL (ref 12–16)
IMM GRANULOCYTES # BLD AUTO: 0.03 K/UL (ref 0–0.04)
IMM GRANULOCYTES NFR BLD AUTO: 0.3 % (ref 0–0.5)
LYMPHOCYTES # BLD AUTO: 3 K/UL (ref 1–4.8)
LYMPHOCYTES NFR BLD: 31.5 % (ref 18–48)
MCH RBC QN AUTO: 30.6 PG (ref 27–31)
MCHC RBC AUTO-ENTMCNC: 31.4 G/DL (ref 32–36)
MCV RBC AUTO: 97 FL (ref 82–98)
MONOCYTES # BLD AUTO: 0.8 K/UL (ref 0.3–1)
MONOCYTES NFR BLD: 8.8 % (ref 4–15)
NEUTROPHILS # BLD AUTO: 5.4 K/UL (ref 1.8–7.7)
NEUTROPHILS NFR BLD: 56.3 % (ref 38–73)
NRBC BLD-RTO: 0 /100 WBC
PLATELET # BLD AUTO: 297 K/UL (ref 150–350)
PMV BLD AUTO: 10.7 FL (ref 9.2–12.9)
POTASSIUM SERPL-SCNC: 3.6 MMOL/L (ref 3.5–5.1)
PROT SERPL-MCNC: 7.8 G/DL (ref 6–8.4)
PTH-INTACT SERPL-MCNC: 41 PG/ML (ref 9–77)
RBC # BLD AUTO: 4.51 M/UL (ref 4–5.4)
SODIUM SERPL-SCNC: 141 MMOL/L (ref 136–145)
TSH SERPL DL<=0.005 MIU/L-ACNC: 1.5 UIU/ML (ref 0.4–4)
WBC # BLD AUTO: 9.55 K/UL (ref 3.9–12.7)

## 2020-11-06 PROCEDURE — 83036 HEMOGLOBIN GLYCOSYLATED A1C: CPT | Mod: HCNC

## 2020-11-06 PROCEDURE — 83970 ASSAY OF PARATHORMONE: CPT | Mod: HCNC

## 2020-11-06 PROCEDURE — 36415 COLL VENOUS BLD VENIPUNCTURE: CPT | Mod: HCNC,PO

## 2020-11-06 PROCEDURE — 85025 COMPLETE CBC W/AUTO DIFF WBC: CPT | Mod: HCNC

## 2020-11-06 PROCEDURE — 84443 ASSAY THYROID STIM HORMONE: CPT | Mod: HCNC

## 2020-11-06 PROCEDURE — 80053 COMPREHEN METABOLIC PANEL: CPT | Mod: HCNC

## 2020-11-12 ENCOUNTER — PATIENT MESSAGE (OUTPATIENT)
Dept: ENDOCRINOLOGY | Facility: CLINIC | Age: 70
End: 2020-11-12

## 2020-11-12 ENCOUNTER — OFFICE VISIT (OUTPATIENT)
Dept: ENDOCRINOLOGY | Facility: CLINIC | Age: 70
End: 2020-11-12
Payer: MEDICARE

## 2020-11-12 VITALS
OXYGEN SATURATION: 97 % | DIASTOLIC BLOOD PRESSURE: 62 MMHG | WEIGHT: 241 LBS | BODY MASS INDEX: 42.7 KG/M2 | HEART RATE: 74 BPM | TEMPERATURE: 98 F | HEIGHT: 63 IN | SYSTOLIC BLOOD PRESSURE: 124 MMHG

## 2020-11-12 DIAGNOSIS — E04.1 THYROID NODULE: Primary | ICD-10-CM

## 2020-11-12 DIAGNOSIS — E03.9 HYPOTHYROIDISM, UNSPECIFIED TYPE: ICD-10-CM

## 2020-11-12 DIAGNOSIS — M85.80 OSTEOPENIA, UNSPECIFIED LOCATION: ICD-10-CM

## 2020-11-12 DIAGNOSIS — E11.42 DIABETIC POLYNEUROPATHY ASSOCIATED WITH TYPE 2 DIABETES MELLITUS: ICD-10-CM

## 2020-11-12 DIAGNOSIS — Z79.4 TYPE 2 DIABETES MELLITUS WITH HYPERGLYCEMIA, WITH LONG-TERM CURRENT USE OF INSULIN: ICD-10-CM

## 2020-11-12 DIAGNOSIS — E11.65 TYPE 2 DIABETES MELLITUS WITH HYPERGLYCEMIA, WITH LONG-TERM CURRENT USE OF INSULIN: ICD-10-CM

## 2020-11-12 PROCEDURE — 1157F PR ADVANCE CARE PLAN OR EQUIV PRESENT IN MEDICAL RECORD: ICD-10-PCS | Mod: HCNC,S$GLB,, | Performed by: INTERNAL MEDICINE

## 2020-11-12 PROCEDURE — 3288F FALL RISK ASSESSMENT DOCD: CPT | Mod: HCNC,CPTII,S$GLB, | Performed by: INTERNAL MEDICINE

## 2020-11-12 PROCEDURE — 1101F PR PT FALLS ASSESS DOC 0-1 FALLS W/OUT INJ PAST YR: ICD-10-PCS | Mod: HCNC,CPTII,S$GLB, | Performed by: INTERNAL MEDICINE

## 2020-11-12 PROCEDURE — 1126F PR PAIN SEVERITY QUANTIFIED, NO PAIN PRESENT: ICD-10-PCS | Mod: HCNC,S$GLB,, | Performed by: INTERNAL MEDICINE

## 2020-11-12 PROCEDURE — 99999 PR PBB SHADOW E&M-EST. PATIENT-LVL III: ICD-10-PCS | Mod: PBBFAC,HCNC,, | Performed by: INTERNAL MEDICINE

## 2020-11-12 PROCEDURE — 99215 PR OFFICE/OUTPT VISIT, EST, LEVL V, 40-54 MIN: ICD-10-PCS | Mod: HCNC,S$GLB,, | Performed by: INTERNAL MEDICINE

## 2020-11-12 PROCEDURE — 3051F HG A1C>EQUAL 7.0%<8.0%: CPT | Mod: HCNC,CPTII,S$GLB, | Performed by: INTERNAL MEDICINE

## 2020-11-12 PROCEDURE — 3078F DIAST BP <80 MM HG: CPT | Mod: HCNC,CPTII,S$GLB, | Performed by: INTERNAL MEDICINE

## 2020-11-12 PROCEDURE — 99999 PR PBB SHADOW E&M-EST. PATIENT-LVL III: CPT | Mod: PBBFAC,HCNC,, | Performed by: INTERNAL MEDICINE

## 2020-11-12 PROCEDURE — 3078F PR MOST RECENT DIASTOLIC BLOOD PRESSURE < 80 MM HG: ICD-10-PCS | Mod: HCNC,CPTII,S$GLB, | Performed by: INTERNAL MEDICINE

## 2020-11-12 PROCEDURE — 3074F SYST BP LT 130 MM HG: CPT | Mod: HCNC,CPTII,S$GLB, | Performed by: INTERNAL MEDICINE

## 2020-11-12 PROCEDURE — 1159F PR MEDICATION LIST DOCUMENTED IN MEDICAL RECORD: ICD-10-PCS | Mod: HCNC,S$GLB,, | Performed by: INTERNAL MEDICINE

## 2020-11-12 PROCEDURE — 3074F PR MOST RECENT SYSTOLIC BLOOD PRESSURE < 130 MM HG: ICD-10-PCS | Mod: HCNC,CPTII,S$GLB, | Performed by: INTERNAL MEDICINE

## 2020-11-12 PROCEDURE — 3008F BODY MASS INDEX DOCD: CPT | Mod: HCNC,CPTII,S$GLB, | Performed by: INTERNAL MEDICINE

## 2020-11-12 PROCEDURE — 1157F ADVNC CARE PLAN IN RCRD: CPT | Mod: HCNC,S$GLB,, | Performed by: INTERNAL MEDICINE

## 2020-11-12 PROCEDURE — 1126F AMNT PAIN NOTED NONE PRSNT: CPT | Mod: HCNC,S$GLB,, | Performed by: INTERNAL MEDICINE

## 2020-11-12 PROCEDURE — 3008F PR BODY MASS INDEX (BMI) DOCUMENTED: ICD-10-PCS | Mod: HCNC,CPTII,S$GLB, | Performed by: INTERNAL MEDICINE

## 2020-11-12 PROCEDURE — 3288F PR FALLS RISK ASSESSMENT DOCUMENTED: ICD-10-PCS | Mod: HCNC,CPTII,S$GLB, | Performed by: INTERNAL MEDICINE

## 2020-11-12 PROCEDURE — 3051F PR MOST RECENT HEMOGLOBIN A1C LEVEL 7.0 - < 8.0%: ICD-10-PCS | Mod: HCNC,CPTII,S$GLB, | Performed by: INTERNAL MEDICINE

## 2020-11-12 PROCEDURE — 1101F PT FALLS ASSESS-DOCD LE1/YR: CPT | Mod: HCNC,CPTII,S$GLB, | Performed by: INTERNAL MEDICINE

## 2020-11-12 PROCEDURE — 1159F MED LIST DOCD IN RCRD: CPT | Mod: HCNC,S$GLB,, | Performed by: INTERNAL MEDICINE

## 2020-11-12 PROCEDURE — 99215 OFFICE O/P EST HI 40 MIN: CPT | Mod: HCNC,S$GLB,, | Performed by: INTERNAL MEDICINE

## 2020-11-12 RX ORDER — CEFDINIR 300 MG/1
CAPSULE ORAL
COMMUNITY
Start: 2020-09-22 | End: 2020-12-17

## 2020-11-12 NOTE — PATIENT INSTRUCTIONS
Estimated Calcium Content of Foods:  Produce  Serving Size Estimated Calcium*    [place] greens, frozen 8 oz 360 mg   Broccoli chris 8 oz 200 mg   Kale, frozen 8 oz 180 mg   Soy Beans, green, boiled 8 oz 175 mg   Bok Franky, cooked, boiled 8 oz 160 mg   Figs, dried 2 figs 65 mg   Broccoli, fresh, cooked 8 oz 60 mg   Oranges 1 whole 55 mg   Seafood Serving Size Estimated Calcium*    Sardines, canned with bones 3 oz 325 mg   Rogers, canned with bones 3 oz 180 mg   Shrimp, canned 3 oz 125 mg   Dairy Serving Size Estimated Calcium*    Ricotta, part-skim 4 oz 335 mg   Yogurt, plain, low-fat 6 oz 310 mg   Milk, skim, low-fat, whole 8 oz 300 mg   Yogurt with fruit, low-fat 6 oz 260 mg   Mozzarella, part-skim 1 oz 210 mg   Cheddar 1 oz 205 mg   Yogurt, Greek 6 oz 200 mg   American Cheese 1 oz 195 mg   Feta Cheese 4 oz 140 mg   Cottage Cheese, 2% 4 oz 105 mg   Frozen yogurt, vanilla 8 oz 105 mg   Ice Cream, vanilla 8 oz 85 mg   Parmesan 1 tbsp 55 mg   Fortified Food Serving Size Estimated Calcium*   [place] milk, rice milk or soy milk, fortified 8 oz 300 mg   Orange juice and other fruit juices, fortified 8 oz 300 mg   Tofu, prepared with calcium 4 oz 205 mg   Waffle, frozen, fortified 2 pieces 200 mg   Oatmeal, fortified 1 packet 140 mg   English muffin, fortified 1 muffin 100 mg   Cereal, fortified 8 oz 100-1,000 mg   Other Serving Size Estimated Calcium*   Mac & cheese, frozen 1 package 325 mg   Pizza, cheese, frozen 1 serving 115 mg   Pudding, chocolate, prepared with 2% milk 4 oz 160 mg   Beans, baked, canned 4 oz 160 mg   *The calcium content listed for most foods is estimated and can vary due to multiple factors. Check the food label to determine how much calcium is in a particular product.  If you read the nutrition label for a food source, it lists the % calcium in that food.  For an 8 oz glass of milk, for example, the label states calcium 30%.  This is equivalent to 300 mg of calcium (multiply the listed number by  10).   **Table from the National [employer] Foundation

## 2020-11-12 NOTE — PROGRESS NOTES
Subjective:      Patient ID: Vivienne Jose is a 70 y.o. female.    Chief Complaint:  DM type 2 & hypothyroidism  History of Present Illness  Vivienne Jose is presenting today for DM type 2 & hypothyroidism follow up.     With regards to the diabetes:  Diagnosed with DM in her early 50s   She was told she had prediabetes in her 30s      Current regimen:  Tresiba 38u HS   Ozempic 0.5 once weekly   Metformin 1000 mg BID  Glipizide 5 mg BID AC     Missed doses? No   Sugar logs reviewed in detail          Hypoglycemia occurs:  Never had lows   Eats 3 meals a day.  Breakfast-honey bun, donuts,oatmeal  Lunch-lean cuisine/dining out left overs  Dinner-dining out   Snacks- lemon cookies,honey bun,candy,icecream,chips  Drinks-diet drinks     Exercise - planning to start to attend yoga       Hypoglycemic event? None   Corrects with juice   Has glucagon emergency kit      Known complications PN and nephropathy     Education - last visit: 05/2015      She consistently gets UTI's   Has BAM using cpap    Diabetes Management Status    Statin: Taking  ACE/ARB: Taking    Screening or Prevention Patient's value Goal Complete/Controlled?   HgA1C Testing and Control   Lab Results   Component Value Date    HGBA1C 7.5 (H) 11/06/2020      Annually/Less than 8% Yes   Lipid profile : 07/10/2020 Annually Yes   LDL control Lab Results   Component Value Date    LDLCALC 68.8 07/10/2020    Annually/Less than 100 mg/dl  Yes   Nephropathy screening Lab Results   Component Value Date    LABMICR 5.0 07/10/2020     Lab Results   Component Value Date    PROTEINUA Negative 11/06/2020    Annually Yes   Blood pressure BP Readings from Last 1 Encounters:   11/12/20 124/62    Less than 140/90 Yes   Dilated retinal exam : 05/15/2020 Annually Yes   Foot exam   : 11/08/2019 Annually No      With regards to her hypothyroidism:  In 1978 she had right lobectomy for goiter and multiple cysts      Current medication:  generic lt4 75 mcg      Results for CHRISTINE  "HARVEY GARCÍA (MRN [id number]) as of 11/12/2020 16:02   Ref. Range 11/6/2020 09:47   TSH Latest Ref Range: 0.400 - 4.000 uIU/mL 1.503     takes thyroid medication properly without food first thing in the morning.      current symptoms:   + weight gain  + Fatigue   Denies Constipation   + Hair loss  + Brittle nails  Occasional  Mental fog         Last thyroid US in 2018       FINDINGS:  History of thyroidectomy.    Right thyroid lobe measures 2.6 x 1.7 x 1.2 cm.  Parenchyma is homogeneous.  No microcalcifications.    Left thyroid lobe measures 3.4 x 1.3 x 1.5 cm.  Hypoechoic nodule measuring 1.1 cm and subcentimeter cystic nodule noted.    Isthmus measures 0.3 cm. Parenchyma is homogeneous.  No microcalcifications.    No evidence for cervical lymphadenopathy.       Impression       Residual thyroid, status post thyroidectomy.  Left thyroid nodule not meeting criteria for fine-needle aspiration.      BMD 5/28/2020:  FINDINGS:  The L1 to L4 vertebral bone mineral density is equal to 1.029 g/cm squared with a T score of -1.3.     The left femoral neck bone mineral density is equal to 0.728 g/cm squared with a T score of -2.2.     There is a 26.8% risk of a major osteoporotic fracture and a 5.9% risk of hip fracture in the next 10 years (FRAX).     Impression:  Lumbar spine osteopenia.  Left femoral neck osteopenia.     Started fosamax once weekly (6 weeks)- she was feeling achy and joint issues. Then the next week Nausea & diarrhea.    No recent falls or fractures  Calcium pills-on and off some days  Vit D gummy-2000        Review of Systems   Review of 7 systems negative except as documented above    Objective:     /62   Pulse 74   Temp 97.7 °F (36.5 °C)   Ht 5' 3" (1.6 m)   Wt 109.3 kg (241 lb)   LMP  (LMP Unknown)   SpO2 97%   BMI 42.69 kg/m²   BP Readings from Last 3 Encounters:   11/12/20 124/62   08/07/20 118/64   08/06/20 121/76     Wt Readings from Last 1 Encounters:   11/12/20 1508 109.3 kg (241 lb) "     Body mass index is 42.69 kg/m².      Physical Exam  Vitals signs reviewed.   Constitutional:       Appearance: Normal appearance. She is well-developed.   HENT:      Head: Normocephalic and atraumatic.   Neck:      Thyroid: No thyromegaly.   Cardiovascular:      Rate and Rhythm: Normal rate.      Heart sounds: Normal heart sounds.   Pulmonary:      Effort: Pulmonary effort is normal.      Breath sounds: Normal breath sounds.   Abdominal:      Palpations: Abdomen is soft.   Neurological:      Mental Status: She is alert and oriented to person, place, and time.   Psychiatric:         Judgment: Judgment normal.                Lab Review:   Lab Results   Component Value Date    HGBA1C 7.5 (H) 11/06/2020     Lab Results   Component Value Date    CHOL 140 07/10/2020    HDL 50 07/10/2020    LDLCALC 68.8 07/10/2020    TRIG 106 07/10/2020    CHOLHDL 35.7 07/10/2020     Lab Results   Component Value Date     11/06/2020    K 3.6 11/06/2020    CL 99 11/06/2020    CO2 30 (H) 11/06/2020     (H) 11/06/2020    BUN 12 11/06/2020    CREATININE 0.8 11/06/2020    CALCIUM 9.5 11/06/2020    PROT 7.8 11/06/2020    ALBUMIN 3.4 (L) 11/06/2020    BILITOT 0.5 11/06/2020    ALKPHOS 80 11/06/2020    AST 62 (H) 11/06/2020    ALT 52 (H) 11/06/2020    ANIONGAP 12 11/06/2020    ESTGFRAFRICA >60.0 11/06/2020    EGFRNONAA >60.0 11/06/2020    TSH 1.503 11/06/2020     Vit D, 25-Hydroxy   Date Value Ref Range Status   11/08/2019 50 30 - 96 ng/mL Final     Comment:     Vitamin D deficiency.........<10 ng/mL                              Vitamin D insufficiency......10-29 ng/mL       Vitamin D sufficiency........> or equal to 30 ng/mL  Vitamin D toxicity............>100 ng/mL     Dxa  The L1 to L4 vertebral bone mineral density is equal to 1.029 g/cm squared with a T score of -1.3.     The left femoral neck bone mineral density is equal to 0.728 g/cm squared with a T score of -2.2.     There is a 26.8% risk of a major osteoporotic fracture  and a 5.9% risk of hip fracture in the next 10 years (FRAX).     Impression:     Lumbar spine osteopenia.     Left femoral neck osteopenia  Assessment and Plan     Type 2 diabetes mellitus with hyperglycemia, with long-term current use of insulin   A1c goal 7-7.5%, dietary indiscretions is her issue.  Strongly recommended to completely abstain from sugar Loaded food(donuts and honey buns)  -Reviewed goals of therapy are to get the best control we can without hypoglycemia  - Discussed diet and exercise at length today, anticipate better control of sugar readings if her diet is well controlled.  Patient willing to try.  She does not wanted any medication changes today and want to try lifestyle modification.    Will continue on Metformin, Glipizide 5 mg BID AC & tresiba 38u HS& Ozempic 0.5 mg weekly      - Reviewed patient's current insulin regimen. Clarified proper insulin dose and timing in relation to meals, etc. Insulin injection sites and proper rotation instructed.     -Advised frequent self blood glucose monitoring.  Patient encouraged to document glucose results and bring them to every clinic visit    -Hypoglycemia precautions discussed. Instructed on precautions before driving.    -Close adherence to lifestyle changes recommended.   -Periodic follow ups for eye evaluations, foot care and dental care suggested.     Diabetic neuropathy: Continue gabapentin and following with podiatry.   Educated patient on proper comfortable foot wear, to always wear dry socks, never walk barefoot, never cut toenails too short, never test bath water with feet, encouraged patient to inspect feet daily for wounds., and if patient applies lotion to this feet to always go around the foot and not in between toes.     Blood pressure well controlled  Lipid panel well controlled on statin      Hypothyroidism  Post-surgical hypothyroidism(partial thyroidectomy)  - Clinically and biochemically euthyroid  - Continue lt4 75 mcg daily   -  Goal is a normal TSH  - Check TFTs and adjust dosage accordingly   - Avoid exogenous hyperthyroidism as this can accelerate bone loss and increase risk of CV complications.  - Advised to take LT4 on an empty stomach with water and to wait 30-45 minutes before eating or taking other medications   - Reviewed that symptoms of hypothyroidism may not correlate with tsh, and a normal TSH is the goal of therapy.....  symptoms are not a justification for over treatment   -will order follow-up thyroid ultrasound to eval nodule         Osteopenia  -- osteopenic but FRAX scores meet treatment. Tried fosamax but had SE so dc'ed herself  -- discussed other options, she refused at this time.  Patient is concerned of side effects and states she is not comfortable.    --Discussed with patient that she is at risk for fractures especially given her low bone mineral density and also discussed that without treatment her subsequent DEXA scans will show worsening of BMD, patient verbalized understanding and want to have some time to think about it.  --will rediscuss during her next follow-up appointment  -- discussed fall precautions   -- Reviewed in detail about calcium supplementation for optimal of bone health.    Reviewed total daily calcium intake of 1200 mg as combination of dietary sources and supplements.  Reviewed that at least half of this should be from dietary sources so that supplemental calcium is 600 mg or less. Calcium handout provided.         Morbid obesity with BMI of 40.0-44.9, adult  -- encouraged dietary and lifestyle modifications   -- emphasized weight loss goals

## 2020-11-13 ENCOUNTER — CLINICAL SUPPORT (OUTPATIENT)
Dept: INTERNAL MEDICINE | Facility: CLINIC | Age: 70
End: 2020-11-13
Payer: MEDICARE

## 2020-11-13 DIAGNOSIS — J30.9 CHRONIC ALLERGIC RHINITIS: ICD-10-CM

## 2020-11-13 PROCEDURE — 95115 PR IMMUNOTHERAPY, ONE INJECTION: ICD-10-PCS | Mod: HCNC,S$GLB,, | Performed by: FAMILY MEDICINE

## 2020-11-13 PROCEDURE — 99499 UNLISTED E&M SERVICE: CPT | Mod: HCNC,S$GLB,, | Performed by: ALLERGY & IMMUNOLOGY

## 2020-11-13 PROCEDURE — 99499 NO LOS: ICD-10-PCS | Mod: HCNC,S$GLB,, | Performed by: ALLERGY & IMMUNOLOGY

## 2020-11-13 PROCEDURE — 95115 IMMUNOTHERAPY ONE INJECTION: CPT | Mod: HCNC,S$GLB,, | Performed by: FAMILY MEDICINE

## 2020-11-19 PROBLEM — E04.1 THYROID NODULE: Status: ACTIVE | Noted: 2020-11-19

## 2020-11-19 PROBLEM — E11.65 TYPE 2 DIABETES MELLITUS WITH HYPERGLYCEMIA, WITH LONG-TERM CURRENT USE OF INSULIN: Status: ACTIVE | Noted: 2018-02-23

## 2020-11-19 NOTE — ASSESSMENT & PLAN NOTE
A1c goal 7-7.5%, dietary indiscretions is her issue.  Strongly recommended to completely abstain from sugar Loaded food(donuts and honey buns)  -Reviewed goals of therapy are to get the best control we can without hypoglycemia  - Discussed diet and exercise at length today, anticipate better control of sugar readings if her diet is well controlled.  Patient willing to try.  She does not wanted any medication changes today and want to try lifestyle modification.    Will continue on Metformin, Glipizide 5 mg BID AC & tresiba 38u HS& Ozempic 0.5 mg weekly      - Reviewed patient's current insulin regimen. Clarified proper insulin dose and timing in relation to meals, etc. Insulin injection sites and proper rotation instructed.     -Advised frequent self blood glucose monitoring.  Patient encouraged to document glucose results and bring them to every clinic visit    -Hypoglycemia precautions discussed. Instructed on precautions before driving.    -Close adherence to lifestyle changes recommended.   -Periodic follow ups for eye evaluations, foot care and dental care suggested.     Diabetic neuropathy: Continue gabapentin and following with podiatry.   Educated patient on proper comfortable foot wear, to always wear dry socks, never walk barefoot, never cut toenails too short, never test bath water with feet, encouraged patient to inspect feet daily for wounds., and if patient applies lotion to this feet to always go around the foot and not in between toes.     Blood pressure well controlled  Lipid panel well controlled on statin

## 2020-11-19 NOTE — PROGRESS NOTES
Subjective:       Patient ID: Vivienne Jose is a 70 y.o. white female who presents for follow-up evaluation of   No chief complaint on file.      The patient location is: home  The chief complaint leading to consultation is: f/u kidney stones    Visit type: audiovisual    Face to Face time with patient: 36  45 minutes of total time spent on the encounter, which includes face to face time and non-face to face time preparing to see the patient (eg, review of tests), Obtaining and/or reviewing separately obtained history, Documenting clinical information in the electronic or other health record, Independently interpreting results (not separately reported) and communicating results to the patient/family/caregiver, or Care coordination (not separately reported).         Each patient to whom he or she provides medical services by telemedicine is:  (1) informed of the relationship between the physician and patient and the respective role of any other health care provider with respect to management of the patient; and (2) notified that he or she may decline to receive medical services by telemedicine and may withdraw from such care at any time.    Notes:       HPI     Patient is new to me. Last seen by Dr. Sotomayor in Feb 2020.  Prior pertinent chart reviewed since this is patient's first appointment with me.    Patient presents for f/u of kidney stones.  Baseline creatinine of 0.8-0.9.  Removed kidney stone in December 2016 with several UTI's -sees urology.    Feels like she is getting another UTI. States she is having back pain with it.    Significant hx includes diabetes since ~ 2000, hypertension, obstructive   sleep apnea, hyperlipidemia, frequent UTIs,  lithotripsy, and CAD. R knee replacement in 2019. s/p hysterectomy.    Was on cefdinir last month for UTI.     On digital monitoring program. BPs have been okay; says higher readings occur when she is multitasking while taking BP.    Occasional ibuprofen use.The patient  otherwise denies taking NSAIDs, herbal supplements, or new antibiotics, recreational drugs, recent episode of dehydration, diarrhea, nausea or vomiting, acute illness, hospitalization or exposure to IV radiocontrast.     Significant family hx includes: No family history of kidney stones. Her daughter has   frequent UTIs secondary to reflux; mother c TIA    Last renal US: Feb 2017, reviewed.  Last CT renal stone study: Jan 2019, reviewed; left non-obstructing stone    Review of Systems   Constitutional: Negative for chills and fever (no subjective fevers).   Respiratory: Positive for shortness of breath (on exertion).    Cardiovascular: Positive for chest pain (occassional), palpitations (occassional) and leg swelling.   Gastrointestinal: Positive for nausea. Negative for diarrhea and vomiting.   Genitourinary: Positive for dysuria, flank pain (right; worse c movement; intermittent 3/10 since wednesday) and frequency (nocturia increased to 2-3x). Negative for hematuria and urgency.   Neurological: Negative for dizziness.       Objective:       There were no vitals taken for this visit.  Physical Exam  Constitutional:       General: She is not in acute distress.     Appearance: Normal appearance. She is not ill-appearing or toxic-appearing.   HENT:      Head: Normocephalic and atraumatic.   Pulmonary:      Effort: No respiratory distress.   Neurological:      Mental Status: She is alert.   Psychiatric:         Mood and Affect: Mood normal.         Behavior: Behavior normal.         Thought Content: Thought content normal.         Judgment: Judgment normal.           Lab Results   Component Value Date    CREATININE 0.8 11/06/2020    URICACID 4.8 12/14/2016     Prot/Creat Ratio, Urine   Date Value Ref Range Status   11/06/2020 0.08 0.00 - 0.20 Final   02/07/2020 Unable to calculate 0.00 - 0.20 Final   04/09/2019 Unable to calculate 0.00 - 0.20 Final     Lab Results   Component Value Date     11/06/2020    K 3.6  11/06/2020    CO2 30 (H) 11/06/2020    CL 99 11/06/2020     Lab Results   Component Value Date    PTH 41.0 11/06/2020    CALCIUM 9.5 11/06/2020    PHOS 3.3 02/07/2020     Lab Results   Component Value Date    HGB 13.8 11/06/2020    WBC 9.55 11/06/2020    HCT 43.9 11/06/2020      Lab Results   Component Value Date    HGBA1C 7.5 (H) 11/06/2020     11/06/2020    BUN 12 11/06/2020     Lab Results   Component Value Date    LDLCALC 68.8 07/10/2020         Assessment:       1. Flank pain    2. Abdominal pain, unspecified abdominal location    3. Dysuria    4. History of kidney stones    5. Recurrent UTI        Plan:   Nephrolithiasis - one known stone on last CT in left kidney. Having right flank pain today.  - Repeat CT stone study  - If new stones are present, will need to repeat stone risk profile  - Pt says she knows she does not drink enough water; increase hydration    Recurrent UTIs - Reports dysuria, increased frequency, nausea, and right flank pain today  - UA, Urine culture today  - will treat empirically with doxycycline, which was sensitive on the last culture  - will notify urology, as they have been following her for this  - Concerned for possibly pyelonephritis. Pt states she usually has nausea associated c UTIs. Denies fevers and chills. Trying to avoid ER due to COVID-19 pandemic. Pt says she has had pyelonephritis before and felt differently.  Pt was educated on symptoms for which she should report to ER and verbalized understanding.  Will obtain UA, urine culture, CBC, RFP  Today.    HTN - Okay on amlodipine 5 mg, chlorthalidone 25 mg, valsartan 160 mg    All questions patient had were answered.  Asked if further questions. None. F/u in clinic in 6 mos  with labs and urine prior to next visit or sooner if needed.  ER for emergency concerns.    Summary of Plan:  1. CT stone study  2. UA, urine culture, CBC, RFP  3. Start doxycycline 100 mg q 12 hrs x 14 days  4. RTC in 6 mos    Pt discussed with  but not examined by Dr. Sotomayor.

## 2020-11-19 NOTE — ASSESSMENT & PLAN NOTE
-- osteopenic but FRAX scores meet treatment. Tried fosamax but had SE so dc'ed herself  -- discussed other options, she refused at this time.  Patient is concerned of side effects and states she is not comfortable.    --Discussed with patient that she is at risk for fractures especially given her low bone mineral density and also discussed that without treatment her subsequent DEXA scans will show worsening of BMD, patient verbalized understanding and want to have some time to think about it.  --will rediscuss during her next follow-up appointment  -- discussed fall precautions   -- Reviewed in detail about calcium supplementation for optimal of bone health.    Reviewed total daily calcium intake of 1200 mg as combination of dietary sources and supplements.  Reviewed that at least half of this should be from dietary sources so that supplemental calcium is 600 mg or less. Calcium handout provided.         Morbid obesity with BMI of 40.0-44.9, adult  -- encouraged dietary and lifestyle modifications   -- emphasized weight loss goals

## 2020-11-19 NOTE — ASSESSMENT & PLAN NOTE
Post-surgical hypothyroidism(partial thyroidectomy)  - Clinically and biochemically euthyroid  - Continue lt4 75 mcg daily   - Goal is a normal TSH  - Check TFTs and adjust dosage accordingly   - Avoid exogenous hyperthyroidism as this can accelerate bone loss and increase risk of CV complications.  - Advised to take LT4 on an empty stomach with water and to wait 30-45 minutes before eating or taking other medications   - Reviewed that symptoms of hypothyroidism may not correlate with tsh, and a normal TSH is the goal of therapy.....  symptoms are not a justification for over treatment   -will order follow-up thyroid ultrasound to eval nodule

## 2020-11-20 ENCOUNTER — TELEPHONE (OUTPATIENT)
Dept: NEPHROLOGY | Facility: CLINIC | Age: 70
End: 2020-11-20

## 2020-11-20 ENCOUNTER — OFFICE VISIT (OUTPATIENT)
Dept: NEPHROLOGY | Facility: CLINIC | Age: 70
End: 2020-11-20
Payer: MEDICARE

## 2020-11-20 ENCOUNTER — LAB VISIT (OUTPATIENT)
Dept: LAB | Facility: HOSPITAL | Age: 70
End: 2020-11-20
Attending: INTERNAL MEDICINE
Payer: MEDICARE

## 2020-11-20 ENCOUNTER — HOSPITAL ENCOUNTER (OUTPATIENT)
Dept: RADIOLOGY | Facility: HOSPITAL | Age: 70
Discharge: HOME | End: 2020-11-20
Attending: NURSE PRACTITIONER
Payer: MEDICARE

## 2020-11-20 DIAGNOSIS — R10.9 FLANK PAIN: Primary | ICD-10-CM

## 2020-11-20 DIAGNOSIS — R10.9 FLANK PAIN: ICD-10-CM

## 2020-11-20 DIAGNOSIS — N39.0 RECURRENT UTI: ICD-10-CM

## 2020-11-20 DIAGNOSIS — Z87.442 HISTORY OF KIDNEY STONES: ICD-10-CM

## 2020-11-20 DIAGNOSIS — I10 HYPERTENSION, UNSPECIFIED TYPE: ICD-10-CM

## 2020-11-20 DIAGNOSIS — R30.0 DYSURIA: ICD-10-CM

## 2020-11-20 DIAGNOSIS — R10.9 ABDOMINAL PAIN, UNSPECIFIED ABDOMINAL LOCATION: ICD-10-CM

## 2020-11-20 DIAGNOSIS — K74.60 HEPATIC CIRRHOSIS, UNSPECIFIED HEPATIC CIRRHOSIS TYPE, UNSPECIFIED WHETHER ASCITES PRESENT: Primary | ICD-10-CM

## 2020-11-20 LAB
ALBUMIN SERPL BCP-MCNC: 3.4 G/DL (ref 3.5–5.2)
ANION GAP SERPL CALC-SCNC: 8 MMOL/L (ref 8–16)
BASOPHILS # BLD AUTO: 0.05 K/UL (ref 0–0.2)
BASOPHILS NFR BLD: 0.5 % (ref 0–1.9)
BUN SERPL-MCNC: 16 MG/DL (ref 8–23)
CALCIUM SERPL-MCNC: 9.5 MG/DL (ref 8.7–10.5)
CHLORIDE SERPL-SCNC: 100 MMOL/L (ref 95–110)
CO2 SERPL-SCNC: 33 MMOL/L (ref 23–29)
CREAT SERPL-MCNC: 0.9 MG/DL (ref 0.5–1.4)
DIFFERENTIAL METHOD: ABNORMAL
EOSINOPHIL # BLD AUTO: 0.3 K/UL (ref 0–0.5)
EOSINOPHIL NFR BLD: 2.8 % (ref 0–8)
ERYTHROCYTE [DISTWIDTH] IN BLOOD BY AUTOMATED COUNT: 14.7 % (ref 11.5–14.5)
EST. GFR  (AFRICAN AMERICAN): >60 ML/MIN/1.73 M^2
EST. GFR  (NON AFRICAN AMERICAN): >60 ML/MIN/1.73 M^2
GLUCOSE SERPL-MCNC: 184 MG/DL (ref 70–110)
HCT VFR BLD AUTO: 44.3 % (ref 37–48.5)
HGB BLD-MCNC: 13.5 G/DL (ref 12–16)
IMM GRANULOCYTES # BLD AUTO: 0.04 K/UL (ref 0–0.04)
IMM GRANULOCYTES NFR BLD AUTO: 0.4 % (ref 0–0.5)
LYMPHOCYTES # BLD AUTO: 2.7 K/UL (ref 1–4.8)
LYMPHOCYTES NFR BLD: 25 % (ref 18–48)
MCH RBC QN AUTO: 30.5 PG (ref 27–31)
MCHC RBC AUTO-ENTMCNC: 30.5 G/DL (ref 32–36)
MCV RBC AUTO: 100 FL (ref 82–98)
MONOCYTES # BLD AUTO: 0.9 K/UL (ref 0.3–1)
MONOCYTES NFR BLD: 8.7 % (ref 4–15)
NEUTROPHILS # BLD AUTO: 6.8 K/UL (ref 1.8–7.7)
NEUTROPHILS NFR BLD: 62.6 % (ref 38–73)
NRBC BLD-RTO: 0 /100 WBC
PHOSPHATE SERPL-MCNC: 2.9 MG/DL (ref 2.7–4.5)
PLATELET # BLD AUTO: 309 K/UL (ref 150–350)
PMV BLD AUTO: 10.8 FL (ref 9.2–12.9)
POTASSIUM SERPL-SCNC: 3.4 MMOL/L (ref 3.5–5.1)
RBC # BLD AUTO: 4.43 M/UL (ref 4–5.4)
SODIUM SERPL-SCNC: 141 MMOL/L (ref 136–145)
WBC # BLD AUTO: 10.86 K/UL (ref 3.9–12.7)

## 2020-11-20 PROCEDURE — 99454 REM MNTR PHYSIOL PARAM 16-30: CPT | Mod: S$GLB,,, | Performed by: INTERNAL MEDICINE

## 2020-11-20 PROCEDURE — 99454 PR REMOTE MNTR, PHYS PARAM, INITIAL, EA 30 DAYS: ICD-10-PCS | Mod: S$GLB,,, | Performed by: INTERNAL MEDICINE

## 2020-11-20 PROCEDURE — 80069 RENAL FUNCTION PANEL: CPT | Mod: HCNC

## 2020-11-20 PROCEDURE — 1159F MED LIST DOCD IN RCRD: CPT | Mod: HCNC,95,, | Performed by: NURSE PRACTITIONER

## 2020-11-20 PROCEDURE — 1157F PR ADVANCE CARE PLAN OR EQUIV PRESENT IN MEDICAL RECORD: ICD-10-PCS | Mod: HCNC,95,, | Performed by: NURSE PRACTITIONER

## 2020-11-20 PROCEDURE — 74176 CT ABD & PELVIS W/O CONTRAST: CPT | Mod: 26,HCNC,, | Performed by: RADIOLOGY

## 2020-11-20 PROCEDURE — 99213 OFFICE O/P EST LOW 20 MIN: CPT | Mod: HCNC,95,, | Performed by: NURSE PRACTITIONER

## 2020-11-20 PROCEDURE — 1157F ADVNC CARE PLAN IN RCRD: CPT | Mod: HCNC,95,, | Performed by: NURSE PRACTITIONER

## 2020-11-20 PROCEDURE — 85025 COMPLETE CBC W/AUTO DIFF WBC: CPT | Mod: HCNC

## 2020-11-20 PROCEDURE — 36415 COLL VENOUS BLD VENIPUNCTURE: CPT | Mod: HCNC,PO

## 2020-11-20 PROCEDURE — 74176 CT RENAL STONE STUDY ABD PELVIS WO: ICD-10-PCS | Mod: 26,HCNC,, | Performed by: RADIOLOGY

## 2020-11-20 PROCEDURE — 1159F PR MEDICATION LIST DOCUMENTED IN MEDICAL RECORD: ICD-10-PCS | Mod: HCNC,95,, | Performed by: NURSE PRACTITIONER

## 2020-11-20 PROCEDURE — 74176 CT ABD & PELVIS W/O CONTRAST: CPT | Mod: TC,HCNC

## 2020-11-20 PROCEDURE — 99213 PR OFFICE/OUTPT VISIT, EST, LEVL III, 20-29 MIN: ICD-10-PCS | Mod: HCNC,95,, | Performed by: NURSE PRACTITIONER

## 2020-11-20 RX ORDER — DOXYCYCLINE 100 MG/1
100 CAPSULE ORAL EVERY 12 HOURS
Qty: 28 CAPSULE | Refills: 0 | Status: SHIPPED | OUTPATIENT
Start: 2020-11-20 | End: 2020-12-04

## 2020-11-20 RX ORDER — NITROFURANTOIN 25; 75 MG/1; MG/1
100 CAPSULE ORAL 2 TIMES DAILY
Qty: 20 CAPSULE | Refills: 0 | Status: SHIPPED | OUTPATIENT
Start: 2020-11-20 | End: 2020-11-20

## 2020-11-20 NOTE — PATIENT INSTRUCTIONS
CT stone study today.  UA, urine culture, CBC, RFP.  Start doxycycline 100 mg.    Continue low sodium diet.  Avoid NSAID (ibuprofen, advil, motrin, aleve, naprosyn, naproxen, [place], Goody's Powder). Tylenol is okay.  Avoid bactrim/ IV contrast/ gadolinium/ aminoglycoside where possible.  Avoid herbal supplements.  Stay hydrated.  Return to clinic in 6 months with labs (bloodwork and urine) or sooner if needed.  Go to the ER with any emergency concerns.

## 2020-11-20 NOTE — TELEPHONE ENCOUNTER
Called pt and gave CT results. No stone to right kidney where flank pain is. Does have morphologic changes of cirrhosis to liver and mildly elevated liver enzymes. Will message PCP about this.    ER for emergency concerns.

## 2020-11-20 NOTE — Clinical Note
Gordon Cerna,  I just did a virtual visit c Ms. Jose in the nephro clinic. She says she feels like she is getting another UTI.  She's also having flank pain.  She's getting a CT stone study today. I'm starting her on doxy and repeating a culture. I just wanted to loop you in since you are following her for recurrent UTIs.  Not sure if she needs any further workup from your perspective to determine cause.  Thanks,  Sharona

## 2020-11-20 NOTE — PROGRESS NOTES
Good afternoon Dr. Magallon,  I saw Ms. Jose today in the nephro clinic.  I got a CT of her to check for stones, and it showed morphologic changes of cirrhosis. Her liver enzymes are also elevated.  Possible DEL RIO? I just wanted to notify you so you could do further workup as you see fit.  Thanks,  Sharona Archer, SLY-C

## 2020-11-23 ENCOUNTER — HOSPITAL ENCOUNTER (OUTPATIENT)
Dept: RADIOLOGY | Facility: HOSPITAL | Age: 70
Discharge: HOME | End: 2020-11-23
Attending: INTERNAL MEDICINE
Payer: MEDICARE

## 2020-11-23 ENCOUNTER — TELEPHONE (OUTPATIENT)
Dept: ADMINISTRATIVE | Facility: OTHER | Age: 70
End: 2020-11-23

## 2020-11-23 DIAGNOSIS — K74.60 HEPATIC CIRRHOSIS, UNSPECIFIED HEPATIC CIRRHOSIS TYPE, UNSPECIFIED WHETHER ASCITES PRESENT: Primary | ICD-10-CM

## 2020-11-23 DIAGNOSIS — K74.60 HEPATIC CIRRHOSIS, UNSPECIFIED HEPATIC CIRRHOSIS TYPE, UNSPECIFIED WHETHER ASCITES PRESENT: ICD-10-CM

## 2020-11-23 PROCEDURE — 76705 ECHO EXAM OF ABDOMEN: CPT | Mod: 26,HCNC,, | Performed by: RADIOLOGY

## 2020-11-23 PROCEDURE — 76705 US ABDOMEN LIMITED: ICD-10-PCS | Mod: 26,HCNC,, | Performed by: RADIOLOGY

## 2020-11-23 PROCEDURE — 76705 ECHO EXAM OF ABDOMEN: CPT | Mod: TC,HCNC

## 2020-11-24 ENCOUNTER — TELEPHONE (OUTPATIENT)
Dept: TRANSPLANT | Facility: CLINIC | Age: 70
End: 2020-11-24

## 2020-11-24 ENCOUNTER — TELEPHONE (OUTPATIENT)
Dept: HEPATOLOGY | Facility: CLINIC | Age: 70
End: 2020-11-24

## 2020-11-24 PROBLEM — K74.60 CIRRHOSIS: Status: ACTIVE | Noted: 2020-11-24

## 2020-11-24 NOTE — TELEPHONE ENCOUNTER
Called patient schedule her appointment to see liver specialist she accepted 11/25 to see Dr. Christianson at [facility].

## 2020-11-24 NOTE — TELEPHONE ENCOUNTER
----- Message from Marielle Lara RN sent at 11/24/2020  9:12 AM CST -----    ----- Message -----  From: Melody Murphy  Sent: 11/24/2020   7:30 AM CST  To: [facility] Hepat Clinical Staff    Good morning,  The patient has a new referral from Dr. Aislinn Magallon, the diagnosis is Hepatic cirrhosis, unspecified hepatic cirrhosis type. I tried to schedule something for her but nothing is showing available well into next year. Can you assist with scheduling?    Thank you

## 2020-11-24 NOTE — TELEPHONE ENCOUNTER
----- Message from Maryam Abdi sent at 11/24/2020  9:23 AM CST -----  Regarding: no available appts.  So,  appts for MD cirrhosis(low MELD)  are not available till late Jan.  Are There any earlier appts for these patients?    ----- Message -----  From: Marielle Lara RN  Sent: 11/24/2020   9:12 AM CST  To: [facility] Liver Referral Pool      ----- Message -----  From: Melody Murphy  Sent: 11/24/2020   7:30 AM CST  To: [facility] Hepat Clinical Staff    Good morning,  The patient has a new referral from Dr. Aislinn Magallon, the diagnosis is Hepatic cirrhosis, unspecified hepatic cirrhosis type. I tried to schedule something for her but nothing is showing available well into next year. Can you assist with scheduling?    Thank you

## 2020-11-25 ENCOUNTER — OFFICE VISIT (OUTPATIENT)
Dept: HEPATOLOGY | Facility: CLINIC | Age: 70
End: 2020-11-25
Payer: MEDICARE

## 2020-11-25 ENCOUNTER — PATIENT MESSAGE (OUTPATIENT)
Dept: NEPHROLOGY | Facility: CLINIC | Age: 70
End: 2020-11-25

## 2020-11-25 ENCOUNTER — LAB VISIT (OUTPATIENT)
Dept: LAB | Facility: HOSPITAL | Age: 70
End: 2020-11-25
Attending: INTERNAL MEDICINE
Payer: MEDICARE

## 2020-11-25 ENCOUNTER — TELEPHONE (OUTPATIENT)
Dept: NEPHROLOGY | Facility: CLINIC | Age: 70
End: 2020-11-25

## 2020-11-25 VITALS
SYSTOLIC BLOOD PRESSURE: 109 MMHG | DIASTOLIC BLOOD PRESSURE: 51 MMHG | HEIGHT: 63 IN | BODY MASS INDEX: 42.66 KG/M2 | OXYGEN SATURATION: 99 % | WEIGHT: 240.75 LBS | HEART RATE: 75 BPM

## 2020-11-25 DIAGNOSIS — N20.0 NEPHROLITHIASIS: Primary | ICD-10-CM

## 2020-11-25 DIAGNOSIS — K74.60 HEPATIC CIRRHOSIS, UNSPECIFIED HEPATIC CIRRHOSIS TYPE, UNSPECIFIED WHETHER ASCITES PRESENT: ICD-10-CM

## 2020-11-25 DIAGNOSIS — E87.6 HYPOKALEMIA: ICD-10-CM

## 2020-11-25 DIAGNOSIS — E87.3 ALKALOSIS: ICD-10-CM

## 2020-11-25 DIAGNOSIS — N20.0 LEFT RENAL STONE: Primary | ICD-10-CM

## 2020-11-25 LAB
A1AT SERPL-MCNC: 123 MG/DL (ref 100–190)
AFP SERPL-MCNC: 9 NG/ML (ref 0–8.4)
ALBUMIN SERPL BCP-MCNC: 3.3 G/DL (ref 3.5–5.2)
ALBUMIN SERPL BCP-MCNC: 3.3 G/DL (ref 3.5–5.2)
ALP SERPL-CCNC: 92 U/L (ref 55–135)
ALP SERPL-CCNC: 92 U/L (ref 55–135)
ALT SERPL W/O P-5'-P-CCNC: 29 U/L (ref 10–44)
ALT SERPL W/O P-5'-P-CCNC: 29 U/L (ref 10–44)
ANION GAP SERPL CALC-SCNC: 10 MMOL/L (ref 8–16)
ANION GAP SERPL CALC-SCNC: 10 MMOL/L (ref 8–16)
AST SERPL-CCNC: 28 U/L (ref 10–40)
AST SERPL-CCNC: 28 U/L (ref 10–40)
BASOPHILS # BLD AUTO: 0.03 K/UL (ref 0–0.2)
BASOPHILS # BLD AUTO: 0.03 K/UL (ref 0–0.2)
BASOPHILS NFR BLD: 0.4 % (ref 0–1.9)
BASOPHILS NFR BLD: 0.4 % (ref 0–1.9)
BILIRUB SERPL-MCNC: 0.4 MG/DL (ref 0.1–1)
BILIRUB SERPL-MCNC: 0.4 MG/DL (ref 0.1–1)
BUN SERPL-MCNC: 16 MG/DL (ref 8–23)
BUN SERPL-MCNC: 16 MG/DL (ref 8–23)
CALCIUM SERPL-MCNC: 9.2 MG/DL (ref 8.7–10.5)
CALCIUM SERPL-MCNC: 9.2 MG/DL (ref 8.7–10.5)
CHLORIDE SERPL-SCNC: 103 MMOL/L (ref 95–110)
CHLORIDE SERPL-SCNC: 103 MMOL/L (ref 95–110)
CO2 SERPL-SCNC: 29 MMOL/L (ref 23–29)
CO2 SERPL-SCNC: 29 MMOL/L (ref 23–29)
CREAT SERPL-MCNC: 0.8 MG/DL (ref 0.5–1.4)
CREAT SERPL-MCNC: 0.8 MG/DL (ref 0.5–1.4)
DIFFERENTIAL METHOD: ABNORMAL
DIFFERENTIAL METHOD: ABNORMAL
EOSINOPHIL # BLD AUTO: 0.3 K/UL (ref 0–0.5)
EOSINOPHIL # BLD AUTO: 0.3 K/UL (ref 0–0.5)
EOSINOPHIL NFR BLD: 3.3 % (ref 0–8)
EOSINOPHIL NFR BLD: 3.3 % (ref 0–8)
ERYTHROCYTE [DISTWIDTH] IN BLOOD BY AUTOMATED COUNT: 14.6 % (ref 11.5–14.5)
ERYTHROCYTE [DISTWIDTH] IN BLOOD BY AUTOMATED COUNT: 14.6 % (ref 11.5–14.5)
EST. GFR  (AFRICAN AMERICAN): >60 ML/MIN/1.73 M^2
EST. GFR  (AFRICAN AMERICAN): >60 ML/MIN/1.73 M^2
EST. GFR  (NON AFRICAN AMERICAN): >60 ML/MIN/1.73 M^2
EST. GFR  (NON AFRICAN AMERICAN): >60 ML/MIN/1.73 M^2
FERRITIN SERPL-MCNC: 38 NG/ML (ref 20–300)
GLUCOSE SERPL-MCNC: 180 MG/DL (ref 70–110)
GLUCOSE SERPL-MCNC: 180 MG/DL (ref 70–110)
HCT VFR BLD AUTO: 42.6 % (ref 37–48.5)
HCT VFR BLD AUTO: 42.6 % (ref 37–48.5)
HGB BLD-MCNC: 13.2 G/DL (ref 12–16)
HGB BLD-MCNC: 13.2 G/DL (ref 12–16)
IGG SERPL-MCNC: 1536 MG/DL (ref 650–1600)
IMM GRANULOCYTES # BLD AUTO: 0.05 K/UL (ref 0–0.04)
IMM GRANULOCYTES # BLD AUTO: 0.05 K/UL (ref 0–0.04)
IMM GRANULOCYTES NFR BLD AUTO: 0.6 % (ref 0–0.5)
IMM GRANULOCYTES NFR BLD AUTO: 0.6 % (ref 0–0.5)
INR PPP: 1 (ref 0.8–1.2)
INR PPP: 1 (ref 0.8–1.2)
IRON SERPL-MCNC: 53 UG/DL (ref 30–160)
LYMPHOCYTES # BLD AUTO: 2.1 K/UL (ref 1–4.8)
LYMPHOCYTES # BLD AUTO: 2.1 K/UL (ref 1–4.8)
LYMPHOCYTES NFR BLD: 24.9 % (ref 18–48)
LYMPHOCYTES NFR BLD: 24.9 % (ref 18–48)
MCH RBC QN AUTO: 30.2 PG (ref 27–31)
MCH RBC QN AUTO: 30.2 PG (ref 27–31)
MCHC RBC AUTO-ENTMCNC: 31 G/DL (ref 32–36)
MCHC RBC AUTO-ENTMCNC: 31 G/DL (ref 32–36)
MCV RBC AUTO: 98 FL (ref 82–98)
MCV RBC AUTO: 98 FL (ref 82–98)
MONOCYTES # BLD AUTO: 0.7 K/UL (ref 0.3–1)
MONOCYTES # BLD AUTO: 0.7 K/UL (ref 0.3–1)
MONOCYTES NFR BLD: 8 % (ref 4–15)
MONOCYTES NFR BLD: 8 % (ref 4–15)
NEUTROPHILS # BLD AUTO: 5.3 K/UL (ref 1.8–7.7)
NEUTROPHILS # BLD AUTO: 5.3 K/UL (ref 1.8–7.7)
NEUTROPHILS NFR BLD: 62.8 % (ref 38–73)
NEUTROPHILS NFR BLD: 62.8 % (ref 38–73)
NRBC BLD-RTO: 0 /100 WBC
NRBC BLD-RTO: 0 /100 WBC
PLATELET # BLD AUTO: 264 K/UL (ref 150–350)
PLATELET # BLD AUTO: 264 K/UL (ref 150–350)
PMV BLD AUTO: 10.3 FL (ref 9.2–12.9)
PMV BLD AUTO: 10.3 FL (ref 9.2–12.9)
POTASSIUM SERPL-SCNC: 3.3 MMOL/L (ref 3.5–5.1)
POTASSIUM SERPL-SCNC: 3.3 MMOL/L (ref 3.5–5.1)
PROT SERPL-MCNC: 7.7 G/DL (ref 6–8.4)
PROT SERPL-MCNC: 7.7 G/DL (ref 6–8.4)
PROTHROMBIN TIME: 10.8 SEC (ref 9–12.5)
PROTHROMBIN TIME: 10.8 SEC (ref 9–12.5)
RBC # BLD AUTO: 4.37 M/UL (ref 4–5.4)
RBC # BLD AUTO: 4.37 M/UL (ref 4–5.4)
SATURATED IRON: 15 % (ref 20–50)
SODIUM SERPL-SCNC: 142 MMOL/L (ref 136–145)
SODIUM SERPL-SCNC: 142 MMOL/L (ref 136–145)
TOTAL IRON BINDING CAPACITY: 358 UG/DL (ref 250–450)
TRANSFERRIN SERPL-MCNC: 242 MG/DL (ref 200–375)
WBC # BLD AUTO: 8.38 K/UL (ref 3.9–12.7)
WBC # BLD AUTO: 8.38 K/UL (ref 3.9–12.7)

## 2020-11-25 PROCEDURE — 99999 PR PBB SHADOW E&M-EST. PATIENT-LVL III: ICD-10-PCS | Mod: PBBFAC,HCNC,, | Performed by: INTERNAL MEDICINE

## 2020-11-25 PROCEDURE — 86235 NUCLEAR ANTIGEN ANTIBODY: CPT | Mod: 59,HCNC

## 2020-11-25 PROCEDURE — 85025 COMPLETE CBC W/AUTO DIFF WBC: CPT | Mod: HCNC

## 2020-11-25 PROCEDURE — 36415 COLL VENOUS BLD VENIPUNCTURE: CPT | Mod: HCNC,PO

## 2020-11-25 PROCEDURE — 82105 ALPHA-FETOPROTEIN SERUM: CPT | Mod: HCNC

## 2020-11-25 PROCEDURE — 83540 ASSAY OF IRON: CPT | Mod: HCNC

## 2020-11-25 PROCEDURE — 1157F PR ADVANCE CARE PLAN OR EQUIV PRESENT IN MEDICAL RECORD: ICD-10-PCS | Mod: HCNC,S$GLB,, | Performed by: INTERNAL MEDICINE

## 2020-11-25 PROCEDURE — 99204 OFFICE O/P NEW MOD 45 MIN: CPT | Mod: HCNC,S$GLB,, | Performed by: INTERNAL MEDICINE

## 2020-11-25 PROCEDURE — 86803 HEPATITIS C AB TEST: CPT | Mod: HCNC

## 2020-11-25 PROCEDURE — 86704 HEP B CORE ANTIBODY TOTAL: CPT | Mod: HCNC

## 2020-11-25 PROCEDURE — 3074F PR MOST RECENT SYSTOLIC BLOOD PRESSURE < 130 MM HG: ICD-10-PCS | Mod: HCNC,CPTII,S$GLB, | Performed by: INTERNAL MEDICINE

## 2020-11-25 PROCEDURE — 80053 COMPREHEN METABOLIC PANEL: CPT | Mod: HCNC

## 2020-11-25 PROCEDURE — 86039 ANTINUCLEAR ANTIBODIES (ANA): CPT | Mod: HCNC

## 2020-11-25 PROCEDURE — 1101F PR PT FALLS ASSESS DOC 0-1 FALLS W/OUT INJ PAST YR: ICD-10-PCS | Mod: HCNC,CPTII,S$GLB, | Performed by: INTERNAL MEDICINE

## 2020-11-25 PROCEDURE — 82784 ASSAY IGA/IGD/IGG/IGM EACH: CPT | Mod: HCNC

## 2020-11-25 PROCEDURE — 3078F PR MOST RECENT DIASTOLIC BLOOD PRESSURE < 80 MM HG: ICD-10-PCS | Mod: HCNC,CPTII,S$GLB, | Performed by: INTERNAL MEDICINE

## 2020-11-25 PROCEDURE — 3288F FALL RISK ASSESSMENT DOCD: CPT | Mod: HCNC,CPTII,S$GLB, | Performed by: INTERNAL MEDICINE

## 2020-11-25 PROCEDURE — 86235 NUCLEAR ANTIGEN ANTIBODY: CPT | Mod: 91,HCNC

## 2020-11-25 PROCEDURE — 3008F BODY MASS INDEX DOCD: CPT | Mod: HCNC,CPTII,S$GLB, | Performed by: INTERNAL MEDICINE

## 2020-11-25 PROCEDURE — 3008F PR BODY MASS INDEX (BMI) DOCUMENTED: ICD-10-PCS | Mod: HCNC,CPTII,S$GLB, | Performed by: INTERNAL MEDICINE

## 2020-11-25 PROCEDURE — 3078F DIAST BP <80 MM HG: CPT | Mod: HCNC,CPTII,S$GLB, | Performed by: INTERNAL MEDICINE

## 2020-11-25 PROCEDURE — 86706 HEP B SURFACE ANTIBODY: CPT | Mod: HCNC

## 2020-11-25 PROCEDURE — 3288F PR FALLS RISK ASSESSMENT DOCUMENTED: ICD-10-PCS | Mod: HCNC,CPTII,S$GLB, | Performed by: INTERNAL MEDICINE

## 2020-11-25 PROCEDURE — 1101F PT FALLS ASSESS-DOCD LE1/YR: CPT | Mod: HCNC,CPTII,S$GLB, | Performed by: INTERNAL MEDICINE

## 2020-11-25 PROCEDURE — 99499 UNLISTED E&M SERVICE: CPT | Mod: S$GLB,,, | Performed by: INTERNAL MEDICINE

## 2020-11-25 PROCEDURE — 86256 FLUORESCENT ANTIBODY TITER: CPT | Mod: HCNC

## 2020-11-25 PROCEDURE — 3074F SYST BP LT 130 MM HG: CPT | Mod: HCNC,CPTII,S$GLB, | Performed by: INTERNAL MEDICINE

## 2020-11-25 PROCEDURE — 99499 RISK ADDL DX/OHS AUDIT: ICD-10-PCS | Mod: S$GLB,,, | Performed by: INTERNAL MEDICINE

## 2020-11-25 PROCEDURE — 1126F PR PAIN SEVERITY QUANTIFIED, NO PAIN PRESENT: ICD-10-PCS | Mod: HCNC,S$GLB,, | Performed by: INTERNAL MEDICINE

## 2020-11-25 PROCEDURE — 80321 ALCOHOLS BIOMARKERS 1OR 2: CPT | Mod: HCNC

## 2020-11-25 PROCEDURE — 86038 ANTINUCLEAR ANTIBODIES: CPT | Mod: HCNC

## 2020-11-25 PROCEDURE — 82728 ASSAY OF FERRITIN: CPT | Mod: HCNC

## 2020-11-25 PROCEDURE — 99999 PR PBB SHADOW E&M-EST. PATIENT-LVL III: CPT | Mod: PBBFAC,HCNC,, | Performed by: INTERNAL MEDICINE

## 2020-11-25 PROCEDURE — 1159F PR MEDICATION LIST DOCUMENTED IN MEDICAL RECORD: ICD-10-PCS | Mod: HCNC,S$GLB,, | Performed by: INTERNAL MEDICINE

## 2020-11-25 PROCEDURE — 86790 VIRUS ANTIBODY NOS: CPT | Mod: HCNC

## 2020-11-25 PROCEDURE — 1126F AMNT PAIN NOTED NONE PRSNT: CPT | Mod: HCNC,S$GLB,, | Performed by: INTERNAL MEDICINE

## 2020-11-25 PROCEDURE — 1159F MED LIST DOCD IN RCRD: CPT | Mod: HCNC,S$GLB,, | Performed by: INTERNAL MEDICINE

## 2020-11-25 PROCEDURE — 1157F ADVNC CARE PLAN IN RCRD: CPT | Mod: HCNC,S$GLB,, | Performed by: INTERNAL MEDICINE

## 2020-11-25 PROCEDURE — 82103 ALPHA-1-ANTITRYPSIN TOTAL: CPT | Mod: HCNC

## 2020-11-25 PROCEDURE — 85610 PROTHROMBIN TIME: CPT | Mod: HCNC

## 2020-11-25 PROCEDURE — 84244 ASSAY OF RENIN: CPT | Mod: HCNC

## 2020-11-25 PROCEDURE — 82088 ASSAY OF ALDOSTERONE: CPT | Mod: HCNC

## 2020-11-25 PROCEDURE — 87340 HEPATITIS B SURFACE AG IA: CPT | Mod: HCNC

## 2020-11-25 PROCEDURE — 99204 PR OFFICE/OUTPT VISIT, NEW, LEVL IV, 45-59 MIN: ICD-10-PCS | Mod: HCNC,S$GLB,, | Performed by: INTERNAL MEDICINE

## 2020-11-25 NOTE — LETTER
November 25, 2020      Aislinn Magallon MD  [address] [facility] [address]           [facility] - Hepatology  [address] [address]  Phone: [phone]  Fax: [phone]          Patient: Vivienne Jose   MR Number: [id number]   YOB: 1950   Date of Visit: 11/25/2020       Dear Dr. Aislinn Magallon:    Thank you for referring Vivienne Jose to me for evaluation. Attached you will find relevant portions of my assessment and plan of care.    If you have questions, please do not hesitate to call me. I look forward to following Vivienne Jose along with you.    Sincerely,    Nell Christianson MD    Enclosure  CC:  No Recipients    If you would like to receive this communication electronically, please contact [email or web address] or [phone] to request more information on [employer] access.    For providers and/or their staff who would like to refer a patient to [facility], please contact us through our one-stop-shop provider referral line, [facility] [place], at [phone].    If you feel you have received this communication in error or would no longer like to receive these types of communications, please e-mail [email or web address]

## 2020-11-25 NOTE — Clinical Note
Patricia, this patient has cirrhosis of the liver. She is compensated. I am concerned re her recurrent UTIs. She is relatively immunocompromised and could decompensate her cirrhosis if she developed a severe infection. Is there anything that can be done to minimize the frequency? She mentioned she has a kidney stone? Would it help to remove it? Nell

## 2020-11-25 NOTE — PATIENT INSTRUCTIONS
Cirrhosis    The liver is found on the right side of your belly (abdomen). It is just below the rib cage. The liver has many important jobs. It removes toxins from the blood. It also helps your blood clot to stop bleeding. Cirrhosis happens when the liver is scarred or injured. This damage is permanent. It can cause your liver to stop working (liver failure).   The two most common causes of cirrhosis are long-term heavy alcohol use and having hepatitis B or C. Other things that can damage the liver include toxins, certain medicines, and certain viruses.  Common symptoms of cirrhosis include:  · Tiredness or weakness  · Loss of appetite  · Nausea and vomiting  · Easy bleeding and bruising  · Swelling of the belly (abdomen)  · Weight loss  · Yellowing of the eyes or skin (jaundice)  · Itching  · Confusion  Treatment helps ease symptoms and prevent more liver damage. You may also get treatment to fight the hepatitis virus. Quitting alcohol will help slow down the disease getting worse. It may also prevent more complications. If cirrhosis gets worse and becomes life threatening, you may need a liver transplant.   Home care  · Don't take medicines that can make liver damage worse. Your healthcare provider will tell you if any of the medicines you now take need to be changed. Talk with your provider before taking any medicine not prescribed. These include dietary supplements and herbs. Some of these may make liver damage worse.  · Talk with your healthcare provider about medicines that have acetaminophen or NSAIDs such as ibuprofen and naproxen. These can also harm your liver.   · Stop drinking alcohol. If you find it hard to stop drinking, seek professional help. Consider joining Alcoholics Anonymous or another type of treatment program for support.  · If you use IV drugs, you are at high risk for hepatitis B and C. Seek help to stop.   · Be sure to ask your healthcare provider about recommended vaccines. These include  vaccines for viruses that can cause liver disease.  Follow-up care  Follow up with your healthcare provider or as advised by our staff.  For more information and to learn about support groups for people with liver disease, contact:  · [employer], www.liverfoundation.org, 666.959.6510  · [employer], www.hepfi.org, 111.778.3922  When to seek medical advice  Call your healthcare provider right away if you have any of the following:  · Rapid weight gain with increased size of your belly (abdomen) or leg swelling  · Yellow color of your skin or eyes (jaundice) gets worse  · Excess bleeding from cuts or injuries  Date Last Reviewed: 6/22/2015  © 4763-9512 [employer]. [address] [place] [address], [place] [address]. All rights reserved. This information is not intended as a substitute for professional medical care. Always follow your healthcare professional's instructions.        Treating Cirrhosis  Cirrhosis is a condition where the liver is damaged. Scar tissue slowly replaces healthy tissue. Treatment can control or slow liver scarring. Follow your healthcare providers instructions closely to get the most out of your treatment. And ask your family and friends for support.  Making a treatment plan  You and your healthcare provider will decide on a treatment plan thats best for you. The plan may include one or more of the following:  · Not drinking alcohol. Heavy alcohol use can damage the liver. Once the liver is damaged, even a small amount of alcohol can cause problems. You can slow down the cirrhosis getting worse if you stop all alcohol use.   · Taking medicines. You may need to take these to treat some causes of cirrhosis. These include infection or a bile duct blockage. You may also need medicine to help your blood clot. And you may need medicine if your immune system is attacking the liver or bile ducts. You should not take certain other medicines if you have  cirrhosis. These include NSAIDs such as ibuprofen and naproxen.   · Treating symptoms. Cirrhosis can cause swelling in your stomach and legs. A low-salt diet can help ease this symptom. So can taking water pills (diuretics).  · Eating healthy foods  · Losing extra weight. This is especially important if you are overweight, or have diabetes, high blood pressure, or high cholesterol and triglycerides. Controlling these condition can slow down the cirrhosis getting worse. Exercise can help you lose weight.   · Removing iron from your blood. Sometimes the amount of iron in your liver is higher than normal. Your doctor may remove extra iron from your blood.  Severe cases of cirrhosis may need special treatments. Your healthcare provider can discuss them with you.  Vaccines  Be sure to ask your healthcare provider about recommended vaccines. These include vaccines for viruses than can cause liver disease.  Don't drink alcohol  Alcohol use can destroy liver cells. If you have problems quitting alcohol, ask your healthcare provider to get the support you need. Your healthcare provider may be able to suggest local groups that can help you stop drinking.   Date Last Reviewed: 6/1/2016 © 2000-2017 [employer]. [address] [place] [address]. All rights reserved. This information is not intended as a substitute for professional medical care. Always follow your healthcare professional's instructions.        Understanding Cirrhosis    Cirrhosis is a chronic (lifelong) liver problem. It results from damaged and scarred liver tissue. Cirrhosis cant be cured. But it can be treated. Your healthcare provider can tell you more.  The liver  The liver is a large organ in the upper right part of the belly. A healthy liver metabolizes proteins, carbohydrates, and fats. It makes a digestive fluid called bile and removes toxins from the blood. The liver is also involved in the blood-clotting process.  When you have  cirrhosis  When you have cirrhosis, your liver becomes damaged and scarred. The liver doesnt function as it should. In some cases, cirrhosis can lead to liver failure. If it does, your healthcare provider will tell you whether you may need a liver transplant. You can slow down the progression of cirrhosis if you stop all alcohol use. Also, if you have metabolic problems like being overweight, diabetes, high blood pressure, or high cholesterol and triglycerides, you can also slow down the progression of cirrhosis by trying to improve those diseases.   Causes of cirrhosis  Cirrhosis causes include the following:  · Alcohol use  · Viral liver infections, such as hepatitis  · Chronic bile duct blockage  · Certain inherited diseases that can result in too much copper or iron being stored in the liver  · Certain medicines  · Nonalcoholic fatty liver disease  · Autoimmune disease  Common signs and symptoms  Typical cirrhosis signs and symptoms include the following:  · Fatigue, weakness, and lack of appetite  · Vomiting with or without blood  · Weight loss or weight gain  · Yellowish skin and eyes (jaundice)  · Itching  · Swollen belly and legs  · Intestinal bleeding  · Easy bruising of the skin  · Dilated veins in the esophagus and stomach  · Poor mental function  Date Last Reviewed: 6/1/2016  © 2295-9768 [employer]. [address] [place] [address]. All rights reserved. This information is not intended as a substitute for professional medical care. Always follow your healthcare professional's instructions.        Nonalcoholic Fatty Liver Disease (NAFLD)  Nonalcoholic fatty liver disease (NAFLD) is a common disease of the liver. It occurs when you have too much fat in the liver. If NAFLD is severe, it can cause liver damage that seems like the damage caused by drinking too much alcohol. But NAFLD is not caused by drinking alcohol. This sheet tells you more about NAFLD and how it can be  managed.    How the liver works   The liver is an organ in the upper right side of the belly (abdomen). It has many important jobs. These include:  · Breaking down (metabolizing) proteins, carbohydrates, and fats  · Making a substance called bile that helps break down fats  · Storing and releasing sugar (glucose) into the blood to give the body energy  · Removing toxins from the blood  · Helping with blood clotting  Understanding NAFLD  A healthy liver may contain some fat. But if too much fat builds up in the liver, this causes NAFLD. NAFLD can be mild, causing fatty liver. Or it can be more severe and show inflammation, as well as the fat. This can cause non-alcoholic steatohepatitis (DEL RIO).  · Fatty liver. With fatty liver, the liver simply has more fat than normal. This extra fat usually does not harm the liver.  · DEL RIO. With DEL RIO, the fatty liver becomes inflamed over time. DEL RIO is serious because it can lead to scarring of the liver (fibrosis). Over time, the scarring may lead to cirrhosis of the liver. This can eventually cause liver failure or liver cancer.  Causes and risk factors of NAFLD  Doctors don't know what causes NAFLD. But certain things make the problem more likely to happen. These include:  · Obesity  · Prediabetes or diabetes  · High levels of fat found in the blood (cholesterol and triglycerides)  · Being exposed to certain medicines   Symptoms of NAFLD  Most people with NAFLD have no symptoms. If symptoms do occur, they can include:  · Tiredness  · Weakness  · Weight loss  · Loss of appetite  · Nausea and vomiting  · Belly pain and cramping  · Yellowing of the skin and eyes (jaundice), as well as dark urine, or light-colored stools  · Swelling in the belly or legs  Diagnosing NAFLD  Your healthcare provider may think you have NAFLD if routine blood tests show high levels of liver enzymes. This may mean you have a liver problem. You may need one or more imaging tests, such as an ultrasound, CT,  or MRI. You may need more blood tests to look for other causes of liver disease. You may also need a liver biopsy. During this test, a hollow needle is used to remove a tiny tissue sample from your liver. This tissue is then checked in a lab. This test can find signs of damage to liver tissue. It can also help figure out the cause of the damage and tell the difference between fatty liver and DEL RIO.  Treating NAFLD  Treatment for NAFLD varies for each person. The best early treatment is to treat any underlying conditions causing metabolic syndrome. This is the name for a group of conditions that includes:  · High blood pressure  · High levels of cholesterol and triglycerides  · Being overweight or obese  · Diabetes  Your healthcare provider will monitor your health and treat any symptoms or underlying health problems you have. Your provider will also work with you to control your risk factors. This will make liver damage less likely. In fact, treating those underlying conditions can often improve liver disease. You may need to take certain medicines, but no medicine will cure NAFLD. This is why treating the underlying conditions is most important. Your plan may include:  · Losing extra weight  · Getting regular exercise  · Controlling diabetes and high cholesterol or triglyceride levels  · Taking medicines and vitamins as prescribed by your provider  · Quitting smoking  · Not drinking alcohol  · Eating a healthy and balanced diet  Living with NAFLD  If NAFLD is caught early, it can be managed with treatment. Your healthcare provider will discuss further treatment choices with you as needed.  Be sure to ask your provider about recommended vaccines. These include vaccines for viruses that can cause liver disease.  Date Last Reviewed: 12/1/2016  © 3440-5783 [employer]. [address] [place] [address], [place] [address]. All rights reserved. This information is not intended as a substitute for professional medical  care. Always follow your healthcare professional's instructions.        Esophageal Varices     With esophageal varices, blood vessels in the esophagus become abnormally enlarged. They may then burst (rupture) and bleed.   Esophageal varices are enlarged veins at the lower end of the esophagus. The esophagus is the tube that carries food from your mouth to your stomach. Varices most often occur because of problems with blood flow in the liver caused by chronic liver disease. Normally, a blood vessel called the portal vein carries blood from the digestive organs to the liver. But with liver disease, blood flow can be blocked due to scarring of the liver. This increases the blood pressure in the portal vein (a condition known as portal hypertension). Blood then backs up in nearby veins in the esophagus and stomach, causing varices. Varices are a serious and deadly problem. Treatment is needed to prevent them from bursting (rupturing) and bleeding. If bleeding occurs, it can be fatal.  Symptoms of esophageal varices  Symptoms do not occur unless the varices are bleeding. This is an emergency problem. If you have any of the following symptoms, get medical attention right away:  · Vomiting blood  · Black, tarry, or bloody stools  · Feeling lightheaded, or fainting (loss of consciousness)  Diagnosing esophageal \varices  Youll likely be checked for varices if you have liver disease or other health problems that can cause them. Your healthcare provider will ask about your symptoms and health history. Youll also be examined. Tests are then done to confirm the problem. Tests can include:  · Upper endoscopy. This is done to see inside the upper digestive tract. During the test, an endoscope is used. This is a thin, flexible tube with a tiny camera on the end. Its inserted through your mouth. Its then guided down through your esophagus, stomach, and first part of your small intestine. This allows the provider to check for  varices and find any bleeding.  · Imaging tests. These provide pictures of the liver or blood flow in the liver. They allow the provider to check for enlarged veins around the liver and assess the risk of bleeding. Common imaging tests done include ultrasound and CT scans.  Treating esophageal varices  The goal of treatment is to reduce the risk of bleeding or to control bleeding. Treatment can include 1 or more of the following:  · Medicines. These may be prescribed to lower the blood pressure inside the enlarged veins. This reduces the risk of bleeding. Beta-blockers are the most common medicine used.  · Endoscopic therapy. These are treatments for enlarged or bleeding veins that are done using an endoscope. With ligation, small rubber bands are placed around the veins to close them off and stop any bleeding. With sclerotherapy, a blood-clotting medicine is injected into the veins to cause scarring and shrink them.  · Balloon tamponade. A tube with a balloon is guided down into your esophagus and stomach. The balloon is then filled with air. This puts pressure on enlarged or bleeding veins to control bleeding. This is a short-term (temporary) way to control bleeding until other treatments are available.   · Surgery. This may be done to place a tubelike device (stent) in the liver. The stent helps redirect blood flow in the liver to lower the blood pressure in enlarged veins. Sometimes, the enlarged veins may be connected to other nearby veins to redirect blood flow. In severe cases, a liver transplant may be needed. For this surgery, a diseased liver is replaced with a healthy liver from another person.   Follow-up  Regular visits with your provider are needed to check for bleeding of the varices. If bleeding occurs, it is likely to occur again. More treatments will then be needed in the future. Once endoscopic therapy (banding) is performed, regular follow-up endoscopic scans with banding are done to completely  get rid of the varices. If you are given medicines to take by mouth, be sure to take them as directed. Work closely with your provider to manage your condition. Know when to seek emergency care.  Date Last Reviewed: 7/1/2016 © 2000-2017 [employer]. [address] [place] [id number]. All rights reserved. This information is not intended as a substitute for professional medical care. Always follow your healthcare professional's instructions.        Upper GI Bleeding (Stable)  Your upper gastrointestinal (GI) tract includes your esophagus, stomach, and upper small intestine. You have signs of bleeding from your upper GI tract. You may have vomited or coughed up blood or coffee-ground like material. Or you may have black or tarry stools. Very small amounts of GI bleeding may not be visible and can only be found by a test of the stool.  Causes of upper GI bleeding can include:  · Tear in the lining of the esophagus  · Enlarged veins in the esophagus  · An ulcer in the stomach or top of the small intestine  · Severe irritation of the stomach  · Inflammation of the digestive tract  Note: A bloody nose or mouth or dental problems may cause blood to be swallowed and vomited up again. This is not true GI bleeding. Iron supplements and medicines for diarrhea and upset stomach can cause black stools. This is not GI bleeding and is not a cause for concern.  Home care  Depending on the cause of your bleeding, care may include the following:  · You may be given medicines to help protect your GI tract, treat your problem, and promote healing. Take these as directed.  · Avoid NSAIDs, such as aspirin, ibuprofen, or naproxen. They can irritate the stomach and cause further bleeding. If you are taking these medicines for other medical reasons, talk to your healthcare provider before you stop them.    · Avoid alcohol, caffeine, and tobacco, which can delay healing and worsen your problem.  Follow-up care  Follow  up with your healthcare provider as advised. Further tests may need to be done to find the cause of your bleeding.  When to seek medical advice  Call your healthcare provider for any of the following:  · Stomach pain appears or gets worse  · Pain spreads to the neck, back, shoulder, or arm  · Weakness or dizziness  · Swelling of your abdomen  · Red blood in your stool  · Fever of 100.4F (38C) or higher, or as directed by your healthcare provider  Call 911  Get emergency medical care if any of these occur:  · Trouble breathing or swallowing  · Severe dizziness  · Loss of consciousness  · Vomiting blood or large amounts of blood in the stool  Date Last Reviewed: 6/24/2015  © 5437-8367 [employer]. [address] [place] [address], [place] [address]. All rights reserved. This information is not intended as a substitute for professional medical care. Always follow your healthcare professional's instructions.      1. Blood tests today  2. Labs in 3 months and then every 6 months  3. US abdomen to look for cancer in 6 months  4. Upper endoscopy to look for esophageal varices  5. Start vitamin C 1000 mg daily  6. I will contact Dr Jaye [place]  Return 3 months with repeat labs

## 2020-11-25 NOTE — PROGRESS NOTES
HEPATOLOGY CONSULTATION    Referring Physician: Aislinn Magallon MD  Current Corresponding Physician: Aislinn Magallon MD, Patricia Cee MD    Reason for Consultation: Consultation for evaluation of cirrhosis    History of Present Illness: Vivienne Jose is a 70 y.o. female with a PMHx of DM, HTN, CAD, hyperlipidemia, recurrent UTIs and obesity who had a renal stone CT and abdominal US (both 11/20) and was found to have a nodular liver c/w cirrhosis but no HCC. The patient does not drink alcohol heavily; there is no family history of chronic liver disease. HCV AB was negative in 2017.    Labs 11/06/20 Tbil 0.5, ALT 52, AST 62, ALKP 80, alb 3.4, plts 309    The patient denies any symptoms of decompensated cirrhosis, including no ascites or edema, cognitive problems that would suggest hepatic encephalopathy, or GI bleeding from varices.    Past Medical History:   Diagnosis Date    Allergy     Angio-edema     Arthritis     Cataract     bilateral - not removed    Cerebrovascular malformation     Cirrhosis 11/24/2020    Colon polyps     Coronary artery disease     Diabetes mellitus, type 2     Diabetic peripheral neuropathy     GERD (gastroesophageal reflux disease)     Herpes infection     Hyperlipidemia     Hypertension     Hypothyroidism     Kidney stones     Mild nonproliferative diabetic retinopathy of 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 edema associated with type 2 diabetes mellitus 4/18/2019    Nausea & vomiting 11/23/2015    Obesity, morbid     Ovarian cyst     Pyelonephritis, chronic     Seizure disorder, focal motor     Sleep apnea     Type II or unspecified type diabetes mellitus with neurological manifestations, uncontrolled(250.62)     Urinary tract infection     Urticaria     Vaginal infection      Outpatient Encounter Medications as of 11/25/2020   Medication Sig Dispense Refill    amLODIPine (NORVASC) 5 MG tablet Take 1 tablet (5 mg total) by mouth every evening. 90 tablet 3     ascorbic acid, vitamin C, (VITAMIN C) 100 MG tablet Take by mouth once daily.      aspirin (ECOTRIN) 81 MG EC tablet Take 81 mg by mouth once daily.      azelastine (ASTELIN) 137 mcg (0.1 %) nasal spray USE 2 SPRAYS NASALLY TWICE DAILY 120 mL 11    blood sugar diagnostic (TRUE METRIX GLUCOSE TEST STRIP) Strp TEST TWO TIMES DAILY 200 strip 6    blood-glucose meter Misc Human True Metrix Air meter 1 each 0    calcium carbonate-vitamin D3 600 mg(1,500mg) -100 unit Cap Take 1 tablet by mouth once daily.      cefdinir (OMNICEF) 300 MG capsule TK ONE C PO  BID      chlorthalidone (HYGROTEN) 25 MG Tab Take 1 tablet (25 mg total) by mouth once daily. 90 tablet 3    cranberry 500 mg Cap Take by mouth.      doxycycline (VIBRAMYCIN) 100 MG Cap Take 1 capsule (100 mg total) by mouth every 12 (twelve) hours. for 14 days 28 capsule 0    fluticasone propionate (FLONASE) 50 mcg/actuation nasal spray USE 2 SPRAYS IN EACH NOSTRIL EVERY DAY 48 g 4    gabapentin (NEURONTIN) 600 MG tablet Take 1 tablet (600 mg total) by mouth 2 (two) times daily. 180 tablet 3    glipiZIDE (GLUCOTROL) 5 MG tablet Take 1 tablet (5 mg total) by mouth 2 (two) times daily before meals. 180 tablet 3    ketotifen (ZADITOR) 0.025 % (0.035 %) ophthalmic solution Place 1-2 drops into both eyes once daily.      L.acid-B.bifidum-B.animal-FOS (PROBIOTIC COMPLEX) 25 billion cell -100 mg Cap Take 1 capsule by mouth once daily.      lancets (TRUEPLUS LANCETS) 28 gauge [facility] Inject 1 lancet into the skin 2 (two) times daily before meals. 200 each 6    levothyroxine (SYNTHROID) 75 MCG tablet Take 1 tablet (75 mcg total) by mouth once daily. 90 tablet 3    loratadine (CLARITIN) 10 mg tablet Take 10 mg by mouth once daily.      lovastatin (MEVACOR) 40 MG tablet TAKE 1 TABLET NIGHTLY (SUBSTITUTED FOR MEVACOR) 90 tablet 3    magnesium oxide-Mg AA chelate (MAGNESIUM, AMINO ACID CHELATE,) 133 mg Tab Take by mouth as directed.       metFORMIN (GLUCOPHAGE)  "1000 MG tablet Take 1 tablet (1,000 mg total) by mouth 2 (two) times daily with meals. 180 tablet 3    mv-mn/folic acid/vit [id number] (ALIVE ONCE DAILY WOMEN 50 PLUS ORAL) Take 1 tablet by mouth once daily.      omeprazole (PRILOSEC) 20 MG capsule Take 1 capsule (20 mg total) by mouth daily as needed. 90 capsule 1    pen needle, diabetic (BD ULTRA-FINE EDUARDO PEN NEEDLE) 32 gauge x 5/32" Ndle USE AS DIRECTED 200 each 6    potassium chloride SA (K-DUR,KLOR-CON) 10 MEQ tablet TAKE 1 TABLET EVERY DAY  EXCEPT TAKE 2 TABLETS ON MONDAY, WEDNESDAY AND FRIDAY 130 tablet 1    PREMARIN vaginal cream Place 0.5 g vaginally 3 (three) times a week. 30 g 0    semaglutide (OZEMPIC) 0.25 mg or 0.5 mg(2 mg/1.5 mL) PnIj Inject 0.25 mg into the skin every 7 days. Take 0.25 mg for 4 weeks, then increase to 0.5 mg weekly 6 Syringe 4    TRESIBA FLEXTOUCH U-100 100 unit/mL (3 mL) InPn INJECT 25 UNITS SUBCUTANEOUSLY EVERY EVENING. 30 mL 3    valACYclovir (VALTREX) 500 MG tablet Take 1 tablet (500 mg total) by mouth once daily. 90 tablet 3    valsartan (DIOVAN) 160 MG tablet Take 1 tablet (160 mg total) by mouth 2 (two) times daily. 180 tablet 3     No facility-administered encounter medications on file as of 11/25/2020.      Review of patient's allergies indicates:   Allergen Reactions    Sulfa (sulfonamide antibiotics) Nausea Only    Ciprofloxacin     Januvia [sitagliptin]      abd pain    Lisinopril     Lotensin [benazepril]     Nexium [esomeprazole magnesium] Other (See Comments)     Gas     Family History   Problem Relation Age of Onset    Cancer Father     Arthritis Father     Gout Father     Allergies Son     Allergic rhinitis Son     Skin cancer Mother     Macular degeneration Mother     Dementia Mother     Hypertension Mother     No Known Problems Daughter     Diabetes Daughter     Hypertension Daughter     No Known Problems Daughter     Glaucoma Maternal Aunt         Great Maternal Aunt    Leukemia " Maternal Uncle     Amblyopia Neg Hx     Cataracts Neg Hx     Retinal detachment Neg Hx     Strabismus Neg Hx     Stroke Neg Hx     Thyroid disease Neg Hx     Kidney disease Neg Hx     Angioedema Neg Hx     Asthma Neg Hx     Atopy Neg Hx     Eczema Neg Hx     Immunodeficiency Neg Hx     Rhinitis Neg Hx     Urticaria Neg Hx        Social History     Socioeconomic History    Marital status:      Spouse name: Not on file    Number of children: Not on file    Years of education: Not on file    Highest education level: Not on file   Occupational History    Occupation: retired     Employer: [employer]   Social Needs    Financial resource strain: Not hard at all    Food insecurity     Worry: Never true     Inability: Never true    Transportation needs     Medical: No     Non-medical: No   Tobacco Use    Smoking status: Never Smoker    Smokeless tobacco: Never Used   Substance and Sexual Activity    Alcohol use: No     Alcohol/week: 0.0 standard drinks     Frequency: Never     Drinks per session: Patient refused     Binge frequency: Never    Drug use: No    Sexual activity: Yes     Partners: Male   Lifestyle    Physical activity     Days per week: 0 days     Minutes per session: 0 min    Stress: Not at all   Relationships    Social connections     Talks on phone: Once a week     Gets together: Never     Attends Mandaeism service: Not on file     Active member of club or organization: No     Attends meetings of clubs or organizations: Never     Relationship status:    Other Topics Concern    Are you pregnant or think you may be? Not Asked    Breast-feeding Not Asked   Social History Narrative    Not on file     Review of Systems   Constitutional: Negative.    HENT: Negative.    Eyes: Negative.    Respiratory: Negative.    Cardiovascular: Negative.    Gastrointestinal: Negative.    Genitourinary: Negative.    Musculoskeletal: Negative.    Skin: Negative.     Neurological: Negative.    Psychiatric/Behavioral: Negative.      Vitals:    11/25/20 0857   BP: (!) 109/51   Pulse: 75       Physical Exam  Vitals signs reviewed.   Constitutional:       Appearance: She is well-developed.   HENT:      Head: Normocephalic and atraumatic.   Eyes:      General: No scleral icterus.     Conjunctiva/sclera: Conjunctivae normal.      Pupils: Pupils are equal, round, and reactive to light.   Neck:      Musculoskeletal: Normal range of motion and neck supple.      Thyroid: No thyromegaly.   Cardiovascular:      Rate and Rhythm: Normal rate and regular rhythm.      Heart sounds: Normal heart sounds.   Pulmonary:      Effort: Pulmonary effort is normal.      Breath sounds: Normal breath sounds. No rales.   Abdominal:      General: Bowel sounds are normal. There is no distension.      Palpations: Abdomen is soft. There is no mass.      Tenderness: There is no abdominal tenderness.   Musculoskeletal: Normal range of motion.   Skin:     General: Skin is warm and dry.      Findings: No rash.   Neurological:      Mental Status: She is alert and oriented to person, place, and time.         Computed MELD-Na score unavailable. Necessary lab results were not found in the last year.  Computed MELD score unavailable. Necessary lab results were not found in the last year.    Lab Results   Component Value Date     (H) 11/20/2020    BUN 16 11/20/2020    CREATININE 0.9 11/20/2020    CALCIUM 9.5 11/20/2020     11/20/2020    K 3.4 (L) 11/20/2020     11/20/2020    PROT 7.8 11/06/2020    CO2 33 (H) 11/20/2020    ANIONGAP 8 11/20/2020    WBC 10.86 11/20/2020    RBC 4.43 11/20/2020    HGB 13.5 11/20/2020    HCT 44.3 11/20/2020     (H) 11/20/2020    MCH 30.5 11/20/2020    MCHC 30.5 (L) 11/20/2020     Lab Results   Component Value Date    RDW 14.7 (H) 11/20/2020     11/20/2020    MPV 10.8 11/20/2020    GRAN 6.8 11/20/2020    GRAN 62.6 11/20/2020    LYMPH 2.7 11/20/2020    LYMPH  25.0 11/20/2020    MONO 0.9 11/20/2020    MONO 8.7 11/20/2020    EOSINOPHIL 2.8 11/20/2020    BASOPHIL 0.5 11/20/2020    EOS 0.3 11/20/2020    BASO 0.05 11/20/2020    APTT 25.6 07/27/2011    GROUPTRH O POS 11/29/2007    CHOL 140 07/10/2020    TRIG 106 07/10/2020    HDL 50 07/10/2020    CHOLHDL 35.7 07/10/2020    TOTALCHOLEST 2.8 07/10/2020    ALBUMIN 3.4 (L) 11/20/2020    BILIDIR 0.3 06/15/2013    AST 62 (H) 11/06/2020    ALT 52 (H) 11/06/2020    ALKPHOS 80 11/06/2020    MG 2.0 07/27/2011    LABPROT 11.1 07/27/2011    INR 1.1 07/27/2011       Assessment and Plan:  Patient Active Problem List   Diagnosis    Trigger middle finger of right hand    Kidney stones    Essential hypertension    CAD (coronary artery disease)    Chronic allergic rhinitis    Nuclear sclerosis - Both Eyes    Carpal tunnel syndrome of right wrist    Proteinuria    BAM (obstructive sleep apnea)    Sacroiliitis    Facet arthritis of lumbar region    Physical deconditioning    Osteopenia    Vitamin D deficiency    Hypocalcemia    Morbid obesity with body mass index (BMI) of 40.0 to 44.9 in adult    Recurrent HSV (herpes simplex virus)    Chronic pain of right knee    Gastroesophageal reflux disease    Hyperlipidemia    Nephrolithiasis    Recurrent UTI    Hypokalemia    Diabetic polyneuropathy associated with type 2 diabetes mellitus    Diverticulosis of intestine without bleeding    Skin nodule    Facet arthritis of cervical region    Bilateral carotid artery disease    Type 2 diabetes mellitus with hyperglycemia, with long-term current use of insulin    Interstitial cystitis    Chronic left-sided low back pain with left-sided sciatica    Hematuria    S/P total knee arthroplasty, right    Decreased range of motion of right knee    Muscle weakness of lower extremity    Impaired gait and mobility    Mild nonproliferative diabetic retinopathy of 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 edema associated with type 2 diabetes  mellitus    Hypothyroidism    Thyroid nodule    Cirrhosis     Vivienne Jose is a 70 y.o. female with compensated cirrhosis. Current recommendations:  1. Cirrhosis, compensated: likely secondary to DEL RIO. Recommend full serologic w/u for CLD; US every 6 months to screen for HCC; EGD to screen for varices; meld labs now and in 3 months. Then every 6 months if stable disease  2. Recurrent UTIs: this is of concern in a patient with cirrhosis. Will contact Dr Cee to discuss strategy to decrease frequency of UTIs.    Return 3 months

## 2020-11-25 NOTE — PROGRESS NOTES
kub to evaluate the left kidney stone.  If can see on KUB then can discuss lithotripsy.   If not visible then discuss another treatment.

## 2020-11-25 NOTE — TELEPHONE ENCOUNTER
----- Message from Sharona Archer NP sent at 11/25/2020  9:53 AM CST -----  Pls call pt to set up bloodwork (renin and vince ordered) and stone risk profile (she needs to come get jug).  You can maybe add bloodwork to today's orders if she hasn't been drawn yet. Or catch her to see if she is still here to  jug

## 2020-11-27 ENCOUNTER — HOSPITAL ENCOUNTER (OUTPATIENT)
Dept: RADIOLOGY | Facility: HOSPITAL | Age: 70
Discharge: HOME | End: 2020-11-27
Attending: NURSE PRACTITIONER
Payer: MEDICARE

## 2020-11-27 DIAGNOSIS — N20.0 LEFT RENAL STONE: ICD-10-CM

## 2020-11-27 LAB
ANA PATTERN 1: NORMAL
ANA PATTERN 2: NORMAL
ANA SER QL IF: POSITIVE
ANA TITER 2: NORMAL
ANA TITR SER IF: NORMAL {TITER}
HBV CORE AB SERPL QL IA: NEGATIVE
HBV SURFACE AB SER-ACNC: NEGATIVE M[IU]/ML
HBV SURFACE AG SERPL QL IA: NEGATIVE
HCV AB SERPL QL IA: NEGATIVE
HEPATITIS A ANTIBODY, IGG: POSITIVE
MITOCHONDRIA AB TITR SER IF: NORMAL {TITER}

## 2020-11-27 PROCEDURE — 74018 XR ABDOMEN AP 1 VIEW: ICD-10-PCS | Mod: 26,HCNC,, | Performed by: RADIOLOGY

## 2020-11-27 PROCEDURE — 74018 RADEX ABDOMEN 1 VIEW: CPT | Mod: 26,HCNC,, | Performed by: RADIOLOGY

## 2020-11-27 PROCEDURE — 74018 RADEX ABDOMEN 1 VIEW: CPT | Mod: TC,HCNC,PO

## 2020-11-30 LAB
ALDOST SERPL-MCNC: 7.6 NG/DL
ANTI SM ANTIBODY: 0.1 RATIO (ref 0–0.99)
ANTI SM/RNP ANTIBODY: 0.09 RATIO (ref 0–0.99)
ANTI-SM INTERPRETATION: NEGATIVE
ANTI-SM/RNP INTERPRETATION: NEGATIVE
ANTI-SSA ANTIBODY: 0.08 RATIO (ref 0–0.99)
ANTI-SSA INTERPRETATION: NEGATIVE
ANTI-SSB ANTIBODY: 0.11 RATIO (ref 0–0.99)
ANTI-SSB INTERPRETATION: NEGATIVE
DSDNA AB SER-ACNC: NORMAL [IU]/ML
RENIN PLAS-CCNC: 3 NG/ML/H

## 2020-12-01 LAB — SMOOTH MUSCLE AB TITR SER IF: NORMAL {TITER}

## 2020-12-07 ENCOUNTER — TELEPHONE (OUTPATIENT)
Dept: UROLOGY | Facility: CLINIC | Age: 70
End: 2020-12-07

## 2020-12-07 NOTE — TELEPHONE ENCOUNTER
Spoke with patient about her stone seen on CT scan and her KUB results.  She will like to follow up with Dr. Oliveira for her stone.  She has an appointment in February with Dr. Oliveira. She will cancel her appointment with me that is scheduled on this Thursday as she does not have any urinary complaints currently.  She will keep her appointment with Dr. Cee later this month.

## 2020-12-08 ENCOUNTER — PATIENT OUTREACH (OUTPATIENT)
Dept: OTHER | Facility: OTHER | Age: 70
End: 2020-12-08

## 2020-12-08 LAB — PHOSPHATIDYLETHANOL (PETH): NEGATIVE NG/ML

## 2020-12-08 NOTE — PROGRESS NOTES
Digital Medicine: Health  Follow-Up    The history is provided by the patient.             Reason for review: Blood glucose at goal and Blood pressure at goal        Topics Covered on Call: Diet    Additional Follow-up details: Mrs. Jose is doing okay. Patient was able to see a new endocrinologists, but wasn't pleased. She was asked to start testing 4 times daily, but patient states that she would not have enough supplies to do so, nor is she comfortable testing that often. She is pleased with recent trend in BG readings and prefers to continue to test twice daily. Patient states that she will establish care with new endocrinologists in March. Will send message so MD can see ambulatory BG readings recorded by patients i[employer] device.             Diet-Change      Dietary Improvements:Patient reports improved dietary habits to help with BP and BG control. She continues to dine out with her , but reports healthier eating habits including fresh salads, less CHO and water. Encouragement and support provided.              Physical Activity-Not assessed    Medication Adherence-Medication adherence was assessed.      Substance, Sleep, Stress-Not assessed      Continue current diet/physical activity routine.       Addressed patient questions and patient has my contact information if needed prior to next outreach. Patient verbalizes understanding.      Explained the importance of self-monitoring and medication adherence. Encouraged the patient to communicate with their health  for lifestyle modifications to help improve or maintain a healthy lifestyle.               There are no preventive care reminders to display for this patient.      Last 5 Patient Entered Readings                                      Current 30 Day Average: 130/66     Recent Readings 12/8/2020 12/8/2020 12/6/2020 12/6/2020 11/28/2020    SBP (mmHg) 131 147 133 141 131    DBP (mmHg) 68 72 64 64 64    Pulse 61 61 63 68 63        Last 6  Patient Entered Readings                                          Most Recent A1c: 7.5% on 11/6/2020  (Goal: 7%)     Recent Readings 12/8/2020 12/7/2020 12/7/2020 12/6/2020 12/6/2020    Blood Glucose (mg/dL) 124 182 85 127 128

## 2020-12-11 ENCOUNTER — OFFICE VISIT (OUTPATIENT)
Dept: PODIATRY | Facility: CLINIC | Age: 70
End: 2020-12-11
Payer: MEDICARE

## 2020-12-11 ENCOUNTER — HOSPITAL ENCOUNTER (OUTPATIENT)
Dept: RADIOLOGY | Facility: HOSPITAL | Age: 70
Discharge: HOME | End: 2020-12-11
Attending: PODIATRIST
Payer: MEDICARE

## 2020-12-11 ENCOUNTER — CLINICAL SUPPORT (OUTPATIENT)
Dept: INTERNAL MEDICINE | Facility: CLINIC | Age: 70
End: 2020-12-11
Payer: MEDICARE

## 2020-12-11 VITALS
DIASTOLIC BLOOD PRESSURE: 68 MMHG | WEIGHT: 240 LBS | HEART RATE: 72 BPM | BODY MASS INDEX: 42.52 KG/M2 | SYSTOLIC BLOOD PRESSURE: 131 MMHG | HEIGHT: 63 IN

## 2020-12-11 DIAGNOSIS — M89.8X7 EXOSTOSIS OF TOE: ICD-10-CM

## 2020-12-11 DIAGNOSIS — E11.49 TYPE 2 DIABETES MELLITUS WITH NEUROLOGICAL MANIFESTATIONS: Primary | ICD-10-CM

## 2020-12-11 DIAGNOSIS — J30.9 CHRONIC ALLERGIC RHINITIS: ICD-10-CM

## 2020-12-11 DIAGNOSIS — M79.671 BILATERAL FOOT PAIN: ICD-10-CM

## 2020-12-11 DIAGNOSIS — M79.672 BILATERAL FOOT PAIN: ICD-10-CM

## 2020-12-11 PROCEDURE — 3008F PR BODY MASS INDEX (BMI) DOCUMENTED: ICD-10-PCS | Mod: HCNC,CPTII,S$GLB, | Performed by: PODIATRIST

## 2020-12-11 PROCEDURE — 1101F PR PT FALLS ASSESS DOC 0-1 FALLS W/OUT INJ PAST YR: ICD-10-PCS | Mod: HCNC,CPTII,S$GLB, | Performed by: PODIATRIST

## 2020-12-11 PROCEDURE — 1157F PR ADVANCE CARE PLAN OR EQUIV PRESENT IN MEDICAL RECORD: ICD-10-PCS | Mod: HCNC,S$GLB,, | Performed by: PODIATRIST

## 2020-12-11 PROCEDURE — 1157F ADVNC CARE PLAN IN RCRD: CPT | Mod: HCNC,S$GLB,, | Performed by: PODIATRIST

## 2020-12-11 PROCEDURE — 3078F PR MOST RECENT DIASTOLIC BLOOD PRESSURE < 80 MM HG: ICD-10-PCS | Mod: HCNC,CPTII,S$GLB, | Performed by: PODIATRIST

## 2020-12-11 PROCEDURE — 3075F SYST BP GE 130 - 139MM HG: CPT | Mod: HCNC,CPTII,S$GLB, | Performed by: PODIATRIST

## 2020-12-11 PROCEDURE — 95115 PR IMMUNOTHERAPY, ONE INJECTION: ICD-10-PCS | Mod: HCNC,S$GLB,, | Performed by: FAMILY MEDICINE

## 2020-12-11 PROCEDURE — 73630 X-RAY EXAM OF FOOT: CPT | Mod: TC,50,HCNC,PN

## 2020-12-11 PROCEDURE — 95115 IMMUNOTHERAPY ONE INJECTION: CPT | Mod: HCNC,S$GLB,, | Performed by: FAMILY MEDICINE

## 2020-12-11 PROCEDURE — 3075F PR MOST RECENT SYSTOLIC BLOOD PRESS GE 130-139MM HG: ICD-10-PCS | Mod: HCNC,CPTII,S$GLB, | Performed by: PODIATRIST

## 2020-12-11 PROCEDURE — 1159F MED LIST DOCD IN RCRD: CPT | Mod: HCNC,S$GLB,, | Performed by: PODIATRIST

## 2020-12-11 PROCEDURE — 99999 PR PBB SHADOW E&M-EST. PATIENT-LVL III: ICD-10-PCS | Mod: PBBFAC,HCNC,, | Performed by: PODIATRIST

## 2020-12-11 PROCEDURE — 99499 UNLISTED E&M SERVICE: CPT | Mod: HCNC,S$GLB,, | Performed by: ALLERGY & IMMUNOLOGY

## 2020-12-11 PROCEDURE — 99999 PR PBB SHADOW E&M-EST. PATIENT-LVL III: CPT | Mod: PBBFAC,HCNC,, | Performed by: PODIATRIST

## 2020-12-11 PROCEDURE — 99213 OFFICE O/P EST LOW 20 MIN: CPT | Mod: HCNC,S$GLB,, | Performed by: PODIATRIST

## 2020-12-11 PROCEDURE — 99999 PR PBB SHADOW E&M-EST. PATIENT-LVL I: CPT | Mod: PBBFAC,HCNC,,

## 2020-12-11 PROCEDURE — 3078F DIAST BP <80 MM HG: CPT | Mod: HCNC,CPTII,S$GLB, | Performed by: PODIATRIST

## 2020-12-11 PROCEDURE — 99213 PR OFFICE/OUTPT VISIT, EST, LEVL III, 20-29 MIN: ICD-10-PCS | Mod: HCNC,S$GLB,, | Performed by: PODIATRIST

## 2020-12-11 PROCEDURE — 73630 X-RAY EXAM OF FOOT: CPT | Mod: 26,50,HCNC, | Performed by: RADIOLOGY

## 2020-12-11 PROCEDURE — 1101F PT FALLS ASSESS-DOCD LE1/YR: CPT | Mod: HCNC,CPTII,S$GLB, | Performed by: PODIATRIST

## 2020-12-11 PROCEDURE — 3288F PR FALLS RISK ASSESSMENT DOCUMENTED: ICD-10-PCS | Mod: HCNC,CPTII,S$GLB, | Performed by: PODIATRIST

## 2020-12-11 PROCEDURE — 3288F FALL RISK ASSESSMENT DOCD: CPT | Mod: HCNC,CPTII,S$GLB, | Performed by: PODIATRIST

## 2020-12-11 PROCEDURE — 73630 XR FOOT COMPLETE 3 VIEW BILATERAL: ICD-10-PCS | Mod: 26,50,HCNC, | Performed by: RADIOLOGY

## 2020-12-11 PROCEDURE — 1159F PR MEDICATION LIST DOCUMENTED IN MEDICAL RECORD: ICD-10-PCS | Mod: HCNC,S$GLB,, | Performed by: PODIATRIST

## 2020-12-11 PROCEDURE — 3051F PR MOST RECENT HEMOGLOBIN A1C LEVEL 7.0 - < 8.0%: ICD-10-PCS | Mod: HCNC,CPTII,S$GLB, | Performed by: PODIATRIST

## 2020-12-11 PROCEDURE — 1126F PR PAIN SEVERITY QUANTIFIED, NO PAIN PRESENT: ICD-10-PCS | Mod: HCNC,S$GLB,, | Performed by: PODIATRIST

## 2020-12-11 PROCEDURE — 1126F AMNT PAIN NOTED NONE PRSNT: CPT | Mod: HCNC,S$GLB,, | Performed by: PODIATRIST

## 2020-12-11 PROCEDURE — 99999 PR PBB SHADOW E&M-EST. PATIENT-LVL I: ICD-10-PCS | Mod: PBBFAC,HCNC,,

## 2020-12-11 PROCEDURE — 3008F BODY MASS INDEX DOCD: CPT | Mod: HCNC,CPTII,S$GLB, | Performed by: PODIATRIST

## 2020-12-11 PROCEDURE — 3051F HG A1C>EQUAL 7.0%<8.0%: CPT | Mod: HCNC,CPTII,S$GLB, | Performed by: PODIATRIST

## 2020-12-11 PROCEDURE — 99499 NO LOS: ICD-10-PCS | Mod: HCNC,S$GLB,, | Performed by: ALLERGY & IMMUNOLOGY

## 2020-12-11 NOTE — PROGRESS NOTES
Subjective:      Patient ID: Vivienne Jose is a 70 y.o. female.    Chief Complaint: Diabetes Mellitus (Dr. Naun Biswas (Endo) 11/12/20) and Diabetic Foot Exam    Vivienne is a 70 y.o. female who presents to the clinic for evaluation and treatment of high risk feet.  Vivienne has a past medical history of Allergy, Angio-edema, Arthritis, Cataract, Cerebrovascular malformation, Cirrhosis (11/24/2020), Colon polyps, Coronary artery disease, Diabetes mellitus, type 2, Diabetic peripheral neuropathy, GERD (gastroesophageal reflux disease), Herpes infection, Hyperlipidemia, Hypertension, Hypothyroidism, Kidney stones, Mild nonproliferative diabetic retinopathy of 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 edema associated with type 2 diabetes mellitus (4/18/2019), Nausea & vomiting (11/23/2015), Obesity, morbid, Ovarian cyst, Pyelonephritis, chronic, Seizure disorder, focal motor, Sleep apnea, Type II or unspecified type diabetes mellitus with neurological manifestations, uncontrolled(250.62), Urinary tract infection, Urticaria, and Vaginal infection.  This patient has documented high risk feet requiring routine maintenance secondary to diabetes mellitis and those secondary complications of diabetes, as mentioned.  History of left big toe total nail P&A matrixectomy. Complains of aching pain to the tip of the both great toes that is not relieved by wearing open toed shoes. Denies trauma.  Also complains of hypersensitivity and diffuse pain underneath the ball of both feet.  She does get pins and needles sensation and both feet while at rest.    12/11/2020:  Returns for annual diabetic foot check.  Relates that she was diagnosed with cirrhosis of the liver this year and was treated per hepatology.  She is experiencing a great deal of pain at the distal tips of both great toes with previous diagnosis of subungual exostosis.  She has a moderate amount of stiffness in both feet as well.      PCP: Aislinn Magallon MD    Date Last Seen  "by PCP: 11/16/18  Current shoe gear:  baljinder    Hemoglobin A1C   Date Value Ref Range Status   11/06/2020 7.5 (H) 4.0 - 5.6 % Final     Comment:     ADA Screening Guidelines:  5.7-6.4%  Consistent with prediabetes  >or=6.5%  Consistent with diabetes  High levels of fetal hemoglobin interfere with the HbA1C  assay. Heterozygous hemoglobin variants (HbS, HgC, etc)do  not significantly interfere with this assay.   However, presence of multiple variants may affect accuracy.     07/10/2020 7.4 (H) 4.0 - 5.6 % Final     Comment:     ADA Screening Guidelines:  5.7-6.4%  Consistent with prediabetes  >or=6.5%  Consistent with diabetes  High levels of fetal hemoglobin interfere with the HbA1C  assay. Heterozygous hemoglobin variants (HbS, HgC, etc)do  not significantly interfere with this assay.   However, presence of multiple variants may affect accuracy.     03/06/2020 7.5 (H) 4.0 - 5.6 % Final     Comment:     ADA Screening Guidelines:  5.7-6.4%  Consistent with prediabetes  >or=6.5%  Consistent with diabetes  High levels of fetal hemoglobin interfere with the HbA1C  assay. Heterozygous hemoglobin variants (HbS, HgC, etc)do  not significantly interfere with this assay.   However, presence of multiple variants may affect accuracy.       Vitals:    12/11/20 0928   BP: 131/68   Pulse: 72   Weight: 108.9 kg (240 lb)   Height: 5' 3" (1.6 m)   PainSc: 0-No pain      Past Medical History:   Diagnosis Date    Allergy     Angio-edema     Arthritis     Cataract     bilateral - not removed    Cerebrovascular malformation     Cirrhosis 11/24/2020    Colon polyps     Coronary artery disease     Diabetes mellitus, type 2     Diabetic peripheral neuropathy     GERD (gastroesophageal reflux disease)     Herpes infection     Hyperlipidemia     Hypertension     Hypothyroidism     Kidney stones     Mild nonproliferative diabetic retinopathy of 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 edema associated with type 2 diabetes mellitus " 4/18/2019    Nausea & vomiting 11/23/2015    Obesity, morbid     Ovarian cyst     Pyelonephritis, chronic     Seizure disorder, focal motor     Sleep apnea     Type II or unspecified type diabetes mellitus with neurological manifestations, uncontrolled(250.62)     Urinary tract infection     Urticaria     Vaginal infection        Past Surgical History:   Procedure Laterality Date    CARPAL TUNNEL RELEASE Right 2014    COLONOSCOPY      COLONOSCOPY N/A 9/13/2017    Procedure: COLONOSCOPY Golytely;  Surgeon: Gisela Wall MD;  Location: [facility] ENDO;  Service: Endoscopy;  Laterality: N/A;    CYST REMOVAL      skin; multiples    EXTRACORPOREAL SHOCK WAVE LITHOTRIPSY  2002    HYSTERECTOMY  1984    JOINT REPLACEMENT Right 03/06/2019    knee    KIDNEY STONE SURGERY      KNEE ARTHROPLASTY Right 3/6/2019    Procedure: ARTHROPLASTY, KNEE;  Surgeon: Pj Gamboa MD;  Location: [facility] OR;  Service: Orthopedics;  Laterality: Right;  Depuy (Stephen notified 2/21, CC)    THYROIDECTOMY  1977         TONSILLECTOMY, ADENOIDECTOMY      TRIGGER FINGER RELEASE      TUBAL LIGATION         Family History   Problem Relation Age of Onset    Cancer Father     Arthritis Father     Gout Father     Allergies Son     Allergic rhinitis Son     Skin cancer Mother     Macular degeneration Mother     Dementia Mother     Hypertension Mother     No Known Problems Daughter     Diabetes Daughter     Hypertension Daughter     No Known Problems Daughter     Glaucoma Maternal Aunt         Great Maternal Aunt    Leukemia Maternal Uncle     Amblyopia Neg Hx     Cataracts Neg Hx     Retinal detachment Neg Hx     Strabismus Neg Hx     Stroke Neg Hx     Thyroid disease Neg Hx     Kidney disease Neg Hx     Angioedema Neg Hx     Asthma Neg Hx     Atopy Neg Hx     Eczema Neg Hx     Immunodeficiency Neg Hx     Rhinitis Neg Hx     Urticaria Neg Hx        Social History     Socioeconomic History    Marital  status:      Spouse name: Not on file    Number of children: Not on file    Years of education: Not on file    Highest education level: Not on file   Occupational History    Occupation: retired     Employer: [employer]   Social Needs    Financial resource strain: Not hard at all    Food insecurity     Worry: Never true     Inability: Never true    Transportation needs     Medical: No     Non-medical: No   Tobacco Use    Smoking status: Never Smoker    Smokeless tobacco: Never Used   Substance and Sexual Activity    Alcohol use: No     Alcohol/week: 0.0 standard drinks     Frequency: Never     Drinks per session: Patient refused     Binge frequency: Never    Drug use: No    Sexual activity: Yes     Partners: Male   Lifestyle    Physical activity     Days per week: 0 days     Minutes per session: 0 min    Stress: Not at all   Relationships    Social connections     Talks on phone: Once a week     Gets together: Never     Attends [employer] service: Not on file     Active member of club or organization: No     Attends meetings of clubs or organizations: Never     Relationship status:    Other Topics Concern    Are you pregnant or think you may be? Not Asked    Breast-feeding Not Asked   Social History Narrative    Not on file       Current Outpatient Medications   Medication Sig Dispense Refill    amLODIPine (NORVASC) 5 MG tablet Take 1 tablet (5 mg total) by mouth every evening. 90 tablet 3    ascorbic acid, vitamin C, (VITAMIN C) 100 MG tablet Take 500 mg by mouth 2 (two) times daily.       aspirin (ECOTRIN) 81 MG EC tablet Take 81 mg by mouth once daily.      azelastine (ASTELIN) 137 mcg (0.1 %) nasal spray USE 2 SPRAYS NASALLY TWICE DAILY 120 mL 11    blood sugar diagnostic (TRUE METRIX GLUCOSE TEST STRIP) Strp TEST TWO TIMES DAILY 200 strip 6    blood-glucose meter Misc [employer] True Metrix Air meter 1 each 0    calcium carbonate-vitamin D3 600 mg(1,500mg) -100 unit  "Cap Take 1 tablet by mouth once daily.      cefdinir (OMNICEF) 300 MG capsule TK ONE C PO  BID      chlorthalidone (HYGROTEN) 25 MG Tab Take 1 tablet (25 mg total) by mouth once daily. 90 tablet 3    cranberry 500 mg Cap Take by mouth.      fluticasone propionate (FLONASE) 50 mcg/actuation nasal spray USE 2 SPRAYS IN EACH NOSTRIL EVERY DAY 48 g 4    gabapentin (NEURONTIN) 600 MG tablet Take 1 tablet (600 mg total) by mouth 2 (two) times daily. 180 tablet 3    glipiZIDE (GLUCOTROL) 5 MG tablet Take 1 tablet (5 mg total) by mouth 2 (two) times daily before meals. 180 tablet 3    ketotifen (ZADITOR) 0.025 % (0.035 %) ophthalmic solution Place 1-2 drops into both eyes once daily.      L.acid-B.bifidum-B.animal-FOS (PROBIOTIC COMPLEX) 25 billion cell -100 mg Cap Take 1 capsule by mouth once daily.      lancets (TRUEPLUS LANCETS) 28 gauge Misc Inject 1 lancet into the skin 2 (two) times daily before meals. 200 each 6    levothyroxine (SYNTHROID) 75 MCG tablet Take 1 tablet (75 mcg total) by mouth once daily. 90 tablet 3    loratadine (CLARITIN) 10 mg tablet Take 10 mg by mouth once daily.      lovastatin (MEVACOR) 40 MG tablet TAKE 1 TABLET NIGHTLY (SUBSTITUTED FOR MEVACOR) 90 tablet 3    magnesium oxide-Mg AA chelate (MAGNESIUM, AMINO ACID CHELATE,) 133 mg Tab Take by mouth as directed.       metFORMIN (GLUCOPHAGE) 1000 MG tablet Take 1 tablet (1,000 mg total) by mouth 2 (two) times daily with meals. 180 tablet 3    mv-mn/folic acid/vit [id number] (ALIVE ONCE DAILY WOMEN 50 PLUS ORAL) Take 1 tablet by mouth once daily.      omeprazole (PRILOSEC) 20 MG capsule Take 1 capsule (20 mg total) by mouth daily as needed. 90 capsule 1    pen needle, diabetic (BD ULTRA-FINE EDUARDO PEN NEEDLE) 32 gauge x 5/32" Ndle USE AS DIRECTED 200 each 6    potassium chloride SA (K-DUR,KLOR-CON) 10 MEQ tablet TAKE 1 TABLET EVERY DAY  EXCEPT TAKE 2 TABLETS ON MONDAY, WEDNESDAY AND FRIDAY 130 tablet 1    PREMARIN vaginal cream " Place 0.5 g vaginally 3 (three) times a week. 30 g 0    semaglutide (OZEMPIC) 0.25 mg or 0.5 mg(2 mg/1.5 mL) PnIj Inject 0.25 mg into the skin every 7 days. Take 0.25 mg for 4 weeks, then increase to 0.5 mg weekly 6 Syringe 4    TRESIBA FLEXTOUCH U-100 100 unit/mL (3 mL) InPn INJECT 25 UNITS SUBCUTANEOUSLY EVERY EVENING. 30 mL 3    valACYclovir (VALTREX) 500 MG tablet Take 1 tablet (500 mg total) by mouth once daily. 90 tablet 3    valsartan (DIOVAN) 160 MG tablet Take 1 tablet (160 mg total) by mouth 2 (two) times daily. 180 tablet 3     No current facility-administered medications for this visit.        Allergies   Allergen Reactions    Sulfa (Sulfonamide Antibiotics) Nausea Only    Ciprofloxacin     Januvia [Sitagliptin]      abd pain    Lisinopril     Lotensin [Benazepril]     Nexium [Esomeprazole Magnesium] Other (See Comments)     Gas         Review of Systems   Constitution: Negative for chills, fever and malaise/fatigue.   HENT: Negative for congestion and hearing loss.    Cardiovascular: Negative for chest pain, claudication and leg swelling.   Respiratory: Negative for cough and shortness of breath.    Skin: Positive for nail changes. Negative for color change, dry skin and itching.   Musculoskeletal: Positive for back pain. Negative for joint pain, muscle cramps and muscle weakness.   Gastrointestinal: Negative for nausea and vomiting.   Neurological: Positive for numbness and paresthesias. Negative for weakness.   Psychiatric/Behavioral: Negative for altered mental status.           Objective:      Physical Exam  Constitutional:       General: She is not in acute distress.     Appearance: She is not diaphoretic.   Cardiovascular:      Pulses:           Dorsalis pedis pulses are 2+ on the right side and 2+ on the left side.        Posterior tibial pulses are 2+ on the right side and 2+ on the left side.      Comments: CFT< 3 secs all toes bilateral foot, skin temp warm bilateral foot, no digital  hair growth bilateral foot, mild nonpitting lower extremity edema bilateral with slight papular eruption and slight erythema of the legs bilateral.  No rubor on dependency bilateral foot.      Musculoskeletal:      Right foot: No deformity.      Left foot: No deformity.      Comments: Gastrocnemius equinus bilateral. Rectus foot type with adductovarus rotation of fifth toe bilateral. No pain with ROM or MMT bilateral foot.    Palpable enlargement of the distal hallux bilateral with moderate localized pain, no discoloration or edema or warmth.     Negative Lachman test toes 2-5 bilateral foot.   Feet:      Right foot:      Protective Sensation: 10 sites tested. 9 sites sensed.      Skin integrity: No ulcer, blister, skin breakdown, erythema, warmth, callus or dry skin.      Left foot:      Protective Sensation: 10 sites tested. 5 sites sensed.      Skin integrity: No ulcer, blister, skin breakdown, erythema, warmth, callus or dry skin.   Skin:     General: Skin is warm and dry.      Capillary Refill: Capillary refill takes less than 2 seconds.      Coloration: Skin is not pale.      Findings: No ecchymosis, erythema or rash.      Nails: There is no clubbing.        Comments: Bilateral hallux nail bed with healthy appearing skin. Small nail spicule along the medial left hallux nail bed, no pain, no soi.    Remaining nails 2-5 b/l normotrophic and trimmed.     No open lesions or macerations bilateral lower extremity.           Neurological:      Mental Status: She is alert and oriented to person, place, and time.      Sensory: No sensory deficit.      Comments: Vibratory sensation intact bilateral foot. Negative Tinel sign and and provocation sign overlying the lower extremity nerves bilateral.                   Assessment:       Encounter Diagnoses   Name Primary?    Type 2 diabetes mellitus with neurological manifestations Yes    Exostosis of toe     Bilateral foot pain          Plan:       Vivienne was seen today  for diabetes mellitus and diabetic foot exam.    Diagnoses and all orders for this visit:    Type 2 diabetes mellitus with neurological manifestations    Exostosis of toe  -     X-Ray Foot Complete 3 view Bilateral; Future    Bilateral foot pain      I counseled the patient on her conditions, their implications and medical management.    Shoe inspection. Diabetic Foot Education. Patient reminded of the importance of good nutrition and blood sugar control to help prevent podiatric complications of diabetes. Patient instructed on proper foot hygeine. We discussed wearing proper shoe gear, daily foot inspections, never walking without protective shoe gear, never putting sharp instruments to feet, routine podiatric nail visits every 2-3 months. Discussed shoes with adequate toe box space to prevent excessive pressure and rubbing along the toenails.     Follow-up bilateral foot x-ray to assess if there is any significant changes to the exostosis bilateral foot.    Topical compound analgesic cream [facility] to be applied up to 4 times daily as needed.    Discussed avoiding any enclosed shoes that could possibly rub and irritate the area.    RTC 1 year p.r.n. as discussed.    A portion of this note was generated by voice recognition software and may contain topical graphical errors.

## 2020-12-17 ENCOUNTER — PATIENT MESSAGE (OUTPATIENT)
Dept: ADMINISTRATIVE | Facility: OTHER | Age: 70
End: 2020-12-17

## 2020-12-17 ENCOUNTER — OFFICE VISIT (OUTPATIENT)
Dept: ALLERGY | Facility: CLINIC | Age: 70
End: 2020-12-17
Payer: MEDICARE

## 2020-12-17 VITALS — BODY MASS INDEX: 42.89 KG/M2 | HEIGHT: 63 IN | WEIGHT: 242.06 LBS

## 2020-12-17 DIAGNOSIS — J30.9 CHRONIC ALLERGIC RHINITIS: Primary | ICD-10-CM

## 2020-12-17 DIAGNOSIS — J30.89 ALLERGIC RHINITIS DUE TO DUST MITE: ICD-10-CM

## 2020-12-17 DIAGNOSIS — H10.13 ALLERGIC CONJUNCTIVITIS, BILATERAL: ICD-10-CM

## 2020-12-17 DIAGNOSIS — J30.81 CHRONIC ALLERGIC RHINITIS DUE TO ANIMAL HAIR AND DANDER: ICD-10-CM

## 2020-12-17 DIAGNOSIS — Z51.6 ALLERGY DESENSITIZATION THERAPY: ICD-10-CM

## 2020-12-17 PROCEDURE — 1157F PR ADVANCE CARE PLAN OR EQUIV PRESENT IN MEDICAL RECORD: ICD-10-PCS | Mod: HCNC,S$GLB,, | Performed by: ALLERGY & IMMUNOLOGY

## 2020-12-17 PROCEDURE — 3008F PR BODY MASS INDEX (BMI) DOCUMENTED: ICD-10-PCS | Mod: HCNC,CPTII,S$GLB, | Performed by: ALLERGY & IMMUNOLOGY

## 2020-12-17 PROCEDURE — 99214 OFFICE O/P EST MOD 30 MIN: CPT | Mod: HCNC,S$GLB,, | Performed by: ALLERGY & IMMUNOLOGY

## 2020-12-17 PROCEDURE — 3008F BODY MASS INDEX DOCD: CPT | Mod: HCNC,CPTII,S$GLB, | Performed by: ALLERGY & IMMUNOLOGY

## 2020-12-17 PROCEDURE — 1157F ADVNC CARE PLAN IN RCRD: CPT | Mod: HCNC,S$GLB,, | Performed by: ALLERGY & IMMUNOLOGY

## 2020-12-17 PROCEDURE — 1159F PR MEDICATION LIST DOCUMENTED IN MEDICAL RECORD: ICD-10-PCS | Mod: HCNC,S$GLB,, | Performed by: ALLERGY & IMMUNOLOGY

## 2020-12-17 PROCEDURE — 99454 PR REMOTE MNTR, PHYS PARAM, INITIAL, EA 30 DAYS: ICD-10-PCS | Mod: S$GLB,,, | Performed by: INTERNAL MEDICINE

## 2020-12-17 PROCEDURE — 99454 REM MNTR PHYSIOL PARAM 16-30: CPT | Mod: S$GLB,,, | Performed by: INTERNAL MEDICINE

## 2020-12-17 PROCEDURE — 1126F PR PAIN SEVERITY QUANTIFIED, NO PAIN PRESENT: ICD-10-PCS | Mod: HCNC,S$GLB,, | Performed by: ALLERGY & IMMUNOLOGY

## 2020-12-17 PROCEDURE — 99999 PR PBB SHADOW E&M-EST. PATIENT-LVL IV: CPT | Mod: PBBFAC,HCNC,, | Performed by: ALLERGY & IMMUNOLOGY

## 2020-12-17 PROCEDURE — 99214 PR OFFICE/OUTPT VISIT, EST, LEVL IV, 30-39 MIN: ICD-10-PCS | Mod: HCNC,S$GLB,, | Performed by: ALLERGY & IMMUNOLOGY

## 2020-12-17 PROCEDURE — 1159F MED LIST DOCD IN RCRD: CPT | Mod: HCNC,S$GLB,, | Performed by: ALLERGY & IMMUNOLOGY

## 2020-12-17 PROCEDURE — 99999 PR PBB SHADOW E&M-EST. PATIENT-LVL IV: ICD-10-PCS | Mod: PBBFAC,HCNC,, | Performed by: ALLERGY & IMMUNOLOGY

## 2020-12-17 PROCEDURE — 1126F AMNT PAIN NOTED NONE PRSNT: CPT | Mod: HCNC,S$GLB,, | Performed by: ALLERGY & IMMUNOLOGY

## 2020-12-17 RX ORDER — OLOPATADINE HYDROCHLORIDE 1 MG/ML
1 SOLUTION/ DROPS OPHTHALMIC 2 TIMES DAILY PRN
Qty: 15 ML | Refills: 4 | Status: SHIPPED | OUTPATIENT
Start: 2020-12-17 | End: 2021-03-05

## 2020-12-17 RX ORDER — AZELASTINE 1 MG/ML
2 SPRAY, METERED NASAL 2 TIMES DAILY
Qty: 120 ML | Refills: 4 | Status: SHIPPED | OUTPATIENT
Start: 2020-12-17 | End: 2022-06-13 | Stop reason: SDUPTHER

## 2020-12-17 RX ORDER — FLUTICASONE PROPIONATE 50 MCG
2 SPRAY, SUSPENSION (ML) NASAL DAILY
Qty: 48 G | Refills: 4 | Status: SHIPPED | OUTPATIENT
Start: 2020-12-17 | End: 2022-06-13 | Stop reason: SDUPTHER

## 2020-12-17 NOTE — PROGRESS NOTES
Subjective:       Patient ID: Vivienne Jose is a 70 y.o. female.    Chief Complaint:  Follow-up      HPI Comments: 70 year-old woman presents for continued evaluation of chronic allergic rhinitis and conjunctivitis on IT.  She was last seen 12/13/19. In 2012 she had skin test with 4+ cat and dust mites and 1+ willow tree.  She was started on Claritin daily and fluticasone and Zaditor drops in morning.  She did ok on this but had flares so added azelastine 2 SEN BID as needed.  She would still  get sneeze, runny nose, sore throat, dry itchy eyes, stuffy nose and PND.  She started allergy shots 7/2015. She reached maintenance about 12/2015. She was off for several months 2017 due to ordering issues. She is on maintenance, vial 1:1 0.5cc. Gets 1 shots C, Dm, TR in left arm. She has done much better with shots, feels shots really help. She can be around cat fro short time and ok. She notices week before due for shot has some in creased sneeze fits.  She is on Flonase 2 SEN in AM, Zaditor 1 drop each eye in AM, azelastine 1 SEN at night and Claritin 10 mg at night. She did increase shots from every 4 weeks to every 3 weeks and this helps.   No CAD, she had angiogram and told mild blockage but no stents.  She is on ACE-I and ca channel blocker for HTN.      Prior history: new patient evaluation of allergies.  She used to see Dr Weber and was on allergy shots from 5795-8700.  She states they worked great and all symptoms resolved.  However over last year she has felt symptoms returning.  Her eyes itch intensely and are dry at times and water at times.  She has runny nose only when eating otherwise has some sinus pressure and PND>  No stuffy nose.  She sneezes a few times each morning.  She has always been worse around cats but does not have one.  Occ has chest congestion but no asthma.  NO eczema.  No food allergy.  She was worse few months ago otherwise not sure of season.  No difference nay time of day, no diff inside or  out.  Claritin prn helps but makes her sleepy.  Using Zaditor with minimal relief.        Environmental History: see history    Review of Systems   Constitutional: Negative for fever, chills, appetite change and fatigue.   HENT: Positive for rhinorrhea, sneezing and postnasal drip. Negative for ear pain, nosebleeds, congestion, sore throat, facial swelling, trouble swallowing, voice change, sinus pressure and ear discharge.    Eyes: Positive for discharge and itching. Negative for redness and visual disturbance.   Respiratory: Negative for cough, choking, chest tightness, shortness of breath and wheezing.    Cardiovascular: Negative for chest pain, palpitations and leg swelling.   Gastrointestinal: Negative for nausea, vomiting, abdominal pain, diarrhea, constipation and abdominal distention.   Genitourinary: Negative for difficulty urinating.   Musculoskeletal: Negative for myalgias, joint swelling, arthralgias and gait problem.   Skin: Negative for color change and rash.   Neurological: Negative for dizziness, syncope, weakness, light-headedness and headaches.   Hematological: Negative for adenopathy. Does not bruise/bleed easily.   Psychiatric/Behavioral: Negative for behavioral problems, confusion, disturbed wake/sleep cycle and agitation. The patient is not nervous/anxious.         Objective:    Physical Exam   Nursing note and vitals reviewed.  Constitutional: She is oriented to person, place, and time. She appears well-developed and well-nourished. No distress.   HENT:   Head: Normocephalic and atraumatic.   Right Ear: Hearing, tympanic membrane, external ear and ear canal normal.   Left Ear: Hearing, tympanic membrane, external ear and ear canal normal.   Nose: No mucosal edema, rhinorrhea, sinus tenderness or septal deviation. No epistaxis. Right sinus exhibits no maxillary sinus tenderness and no frontal sinus tenderness. Left sinus exhibits no maxillary sinus tenderness and no frontal sinus tenderness.    Mouth/Throat: Uvula is midline, oropharynx is clear and moist and mucous membranes are normal. No uvula swelling.   Eyes: Conjunctivae are normal. Right eye exhibits no discharge. Left eye exhibits no discharge.   Neck: Normal range of motion. No thyromegaly present.   Cardiovascular: Normal rate, regular rhythm and normal heart sounds.    No murmur heard.  Pulmonary/Chest: Effort normal and breath sounds normal. No respiratory distress. She has no wheezes.   Abdominal: Soft. She exhibits no distension. There is no tenderness.   Musculoskeletal: Normal range of motion. She exhibits no edema and no tenderness.   Lymphadenopathy:     She has no cervical adenopathy.   Neurological: She is alert and oriented to person, place, and time.   Skin: Skin is warm and dry. No rash noted. No erythema.   Psychiatric: She has a normal mood and affect. Her behavior is normal. Judgment and thought content normal.       Laboratory:   Percutaneous Skin Testing: Inhalants- 4+ dust mites, 3+ cat, 1+ willow with 3+ histamine control and remainder all negative  Assessment:       1. Chronic allergic rhinitis    2. Chronic allergic rhinitis due to animal hair and dander    3. Allergic rhinitis due to dust mite    4. Allergic conjunctivitis, bilateral    5. Allergy desensitization therapy         Plan:       1. Dust mite avoidance, measures discussed and handout provided.   2. Continue Flonase 2 SEN daily  3. Continue Claritin 10 mg daily  4. Patanol drop daily and BID as needed  5. Continue azelastine 1 SEN nightly and increase to BID as needed  6. continue immunotherapy, vial 1:1 0.5cc  every 3 weeks. Patient voiced understanding and agreed.    Patient advised to remain off beta blockers while on allergy shots and to notify injection clinic and MD of any new medications starts.

## 2020-12-22 ENCOUNTER — PATIENT MESSAGE (OUTPATIENT)
Dept: PODIATRY | Facility: CLINIC | Age: 70
End: 2020-12-22

## 2020-12-25 ENCOUNTER — PATIENT MESSAGE (OUTPATIENT)
Dept: PODIATRY | Facility: CLINIC | Age: 70
End: 2020-12-25

## 2020-12-28 ENCOUNTER — OFFICE VISIT (OUTPATIENT)
Dept: INTERNAL MEDICINE | Facility: CLINIC | Age: 70
End: 2020-12-28
Payer: MEDICARE

## 2020-12-28 VITALS
TEMPERATURE: 98 F | RESPIRATION RATE: 18 BRPM | OXYGEN SATURATION: 98 % | SYSTOLIC BLOOD PRESSURE: 124 MMHG | HEIGHT: 63 IN | DIASTOLIC BLOOD PRESSURE: 62 MMHG | BODY MASS INDEX: 42.38 KG/M2 | HEART RATE: 67 BPM | WEIGHT: 239.19 LBS

## 2020-12-28 DIAGNOSIS — R50.9 FEVER, UNSPECIFIED FEVER CAUSE: ICD-10-CM

## 2020-12-28 DIAGNOSIS — R51.9 HEAD ACHE: ICD-10-CM

## 2020-12-28 DIAGNOSIS — J01.90 ACUTE NON-RECURRENT SINUSITIS, UNSPECIFIED LOCATION: Primary | ICD-10-CM

## 2020-12-28 LAB — SARS-COV-2 RNA RESP QL NAA+PROBE: NOT DETECTED

## 2020-12-28 PROCEDURE — 3288F PR FALLS RISK ASSESSMENT DOCUMENTED: ICD-10-PCS | Mod: HCNC,CPTII,S$GLB, | Performed by: INTERNAL MEDICINE

## 2020-12-28 PROCEDURE — 99213 PR OFFICE/OUTPT VISIT, EST, LEVL III, 20-29 MIN: ICD-10-PCS | Mod: HCNC,S$GLB,, | Performed by: INTERNAL MEDICINE

## 2020-12-28 PROCEDURE — 1126F AMNT PAIN NOTED NONE PRSNT: CPT | Mod: HCNC,S$GLB,, | Performed by: INTERNAL MEDICINE

## 2020-12-28 PROCEDURE — 1157F PR ADVANCE CARE PLAN OR EQUIV PRESENT IN MEDICAL RECORD: ICD-10-PCS | Mod: HCNC,S$GLB,, | Performed by: INTERNAL MEDICINE

## 2020-12-28 PROCEDURE — 3008F BODY MASS INDEX DOCD: CPT | Mod: HCNC,CPTII,S$GLB, | Performed by: INTERNAL MEDICINE

## 2020-12-28 PROCEDURE — 1126F PR PAIN SEVERITY QUANTIFIED, NO PAIN PRESENT: ICD-10-PCS | Mod: HCNC,S$GLB,, | Performed by: INTERNAL MEDICINE

## 2020-12-28 PROCEDURE — 3288F FALL RISK ASSESSMENT DOCD: CPT | Mod: HCNC,CPTII,S$GLB, | Performed by: INTERNAL MEDICINE

## 2020-12-28 PROCEDURE — 1159F PR MEDICATION LIST DOCUMENTED IN MEDICAL RECORD: ICD-10-PCS | Mod: HCNC,S$GLB,, | Performed by: INTERNAL MEDICINE

## 2020-12-28 PROCEDURE — 99213 OFFICE O/P EST LOW 20 MIN: CPT | Mod: HCNC,S$GLB,, | Performed by: INTERNAL MEDICINE

## 2020-12-28 PROCEDURE — 1101F PT FALLS ASSESS-DOCD LE1/YR: CPT | Mod: HCNC,CPTII,S$GLB, | Performed by: INTERNAL MEDICINE

## 2020-12-28 PROCEDURE — 3074F SYST BP LT 130 MM HG: CPT | Mod: HCNC,CPTII,S$GLB, | Performed by: INTERNAL MEDICINE

## 2020-12-28 PROCEDURE — 3078F DIAST BP <80 MM HG: CPT | Mod: HCNC,CPTII,S$GLB, | Performed by: INTERNAL MEDICINE

## 2020-12-28 PROCEDURE — 3078F PR MOST RECENT DIASTOLIC BLOOD PRESSURE < 80 MM HG: ICD-10-PCS | Mod: HCNC,CPTII,S$GLB, | Performed by: INTERNAL MEDICINE

## 2020-12-28 PROCEDURE — 1101F PR PT FALLS ASSESS DOC 0-1 FALLS W/OUT INJ PAST YR: ICD-10-PCS | Mod: HCNC,CPTII,S$GLB, | Performed by: INTERNAL MEDICINE

## 2020-12-28 PROCEDURE — 3008F PR BODY MASS INDEX (BMI) DOCUMENTED: ICD-10-PCS | Mod: HCNC,CPTII,S$GLB, | Performed by: INTERNAL MEDICINE

## 2020-12-28 PROCEDURE — U0003 INFECTIOUS AGENT DETECTION BY NUCLEIC ACID (DNA OR RNA); SEVERE ACUTE RESPIRATORY SYNDROME CORONAVIRUS 2 (SARS-COV-2) (CORONAVIRUS DISEASE [COVID-19]), AMPLIFIED PROBE TECHNIQUE, MAKING USE OF HIGH THROUGHPUT TECHNOLOGIES AS DESCRIBED BY CMS-2020-01-R: HCPCS | Mod: HCNC

## 2020-12-28 PROCEDURE — 3074F PR MOST RECENT SYSTOLIC BLOOD PRESSURE < 130 MM HG: ICD-10-PCS | Mod: HCNC,CPTII,S$GLB, | Performed by: INTERNAL MEDICINE

## 2020-12-28 PROCEDURE — 99999 PR PBB SHADOW E&M-EST. PATIENT-LVL V: CPT | Mod: PBBFAC,HCNC,, | Performed by: INTERNAL MEDICINE

## 2020-12-28 PROCEDURE — 1157F ADVNC CARE PLAN IN RCRD: CPT | Mod: HCNC,S$GLB,, | Performed by: INTERNAL MEDICINE

## 2020-12-28 PROCEDURE — 99999 PR PBB SHADOW E&M-EST. PATIENT-LVL V: ICD-10-PCS | Mod: PBBFAC,HCNC,, | Performed by: INTERNAL MEDICINE

## 2020-12-28 PROCEDURE — 1159F MED LIST DOCD IN RCRD: CPT | Mod: HCNC,S$GLB,, | Performed by: INTERNAL MEDICINE

## 2020-12-28 RX ORDER — AMOXICILLIN AND CLAVULANATE POTASSIUM 875; 125 MG/1; MG/1
1 TABLET, FILM COATED ORAL EVERY 12 HOURS
Qty: 20 TABLET | Refills: 0 | Status: SHIPPED | OUTPATIENT
Start: 2020-12-28 | End: 2021-02-08

## 2020-12-28 NOTE — TELEPHONE ENCOUNTER
I called and spoke with Rick at [facility]. Unfortunately, by lae they would not be able to take the patients medication back since it most likely has been opened and since she has had it for almost a week. He stated that the patient did qualify for a program so her co pay was waived. He stated that the patient did not qualify for the pain cream program, but he would work on getting this approved through her insurance and call the patient.     I called and spoke with the patient. I apologized for the prescription confusion and explained the above. Patient verbalized understanding. She stated that she also called [facility] to see what she could do and was told the same. Patient did state that the cream would cost her $ 60 for a 10 day supply, and she would not afford that. I explained that if [facility] could not get the script at a lower cost, to call the clinic so the staff could speak to Dr. Ohara for a recommendation for another medication. Patient verbalized understanding. Patient explained that she was upset when she wrote the message to Dr. Ohara, but understands that mistakes happen, but wanted us to know what happened. I explained that I was glad she did, so that this can be corrected.

## 2021-01-04 ENCOUNTER — CLINICAL SUPPORT (OUTPATIENT)
Dept: INTERNAL MEDICINE | Facility: CLINIC | Age: 71
End: 2021-01-04
Payer: MEDICARE

## 2021-01-04 DIAGNOSIS — J30.9 CHRONIC ALLERGIC RHINITIS: ICD-10-CM

## 2021-01-04 PROCEDURE — 95115 IMMUNOTHERAPY ONE INJECTION: CPT | Mod: HCNC,S$GLB,, | Performed by: FAMILY MEDICINE

## 2021-01-04 PROCEDURE — 99499 UNLISTED E&M SERVICE: CPT | Mod: HCNC,S$GLB,, | Performed by: ALLERGY & IMMUNOLOGY

## 2021-01-04 PROCEDURE — 99499 NO LOS: ICD-10-PCS | Mod: HCNC,S$GLB,, | Performed by: ALLERGY & IMMUNOLOGY

## 2021-01-04 PROCEDURE — 95115 PR IMMUNOTHERAPY, ONE INJECTION: ICD-10-PCS | Mod: HCNC,S$GLB,, | Performed by: FAMILY MEDICINE

## 2021-01-17 PROCEDURE — 99454 PR REMOTE MNTR, PHYS PARAM, INITIAL, EA 30 DAYS: ICD-10-PCS | Mod: S$GLB,,, | Performed by: INTERNAL MEDICINE

## 2021-01-17 PROCEDURE — 99454 REM MNTR PHYSIOL PARAM 16-30: CPT | Mod: S$GLB,,, | Performed by: INTERNAL MEDICINE

## 2021-01-19 ENCOUNTER — LAB VISIT (OUTPATIENT)
Dept: LAB | Facility: HOSPITAL | Age: 71
End: 2021-01-19
Payer: MEDICARE

## 2021-01-19 DIAGNOSIS — N20.0 NEPHROLITHIASIS: ICD-10-CM

## 2021-01-19 PROCEDURE — 82140 ASSAY OF AMMONIA: CPT | Mod: HCNC

## 2021-01-25 LAB
AMMONIA 24H UR-SRATE: 29 MEQ/DAY (ref 14–62)
CALCIUM 24H UR-MRATE: 276 MG/DAY
CAOX INDEX 24H UR-RTO: 1.29
CAOX INDEX 24H UR-RTO: 1.54
CITRATE 24H UR-MRATE: 995 MG/DAY
CREAT 24H UR-MRATE: 953 MG/DAY (ref 600–1800)
MAGNESIUM 24H UR-MRATE: 104 MG/DAY
NA URATE 24H SATFR UR: 1.47
OXALATE 24H UR-MRATE: 39 MG/DAY
PATIENT HX: ABNORMAL
PH 24H UR: 6.8 [PH] (ref 5.5–7)
PHOSPHATE 24H UR-MRATE: 433 MG/DAY
POTASSIUM 24H UR-SRATE: 61 MEQ/DAY (ref 19–135)
SODIUM 24H UR-SRATE: 189 MEQ/DAY
SPECIMEN VOL 24H UR: 2.64 L/DAY
SULFATE 24H UR-SRATE: 7 MMOL/DAY
SUSPECTED PROBLEM IS: ABNORMAL
TRI-PHOS 24H SATFR UR: 1.29
URATE 24H SATFR UR: 0.18
URATE 24H UR-MRATE: 612 MG/DAY

## 2021-01-28 ENCOUNTER — PATIENT MESSAGE (OUTPATIENT)
Dept: ENDOCRINOLOGY | Facility: CLINIC | Age: 71
End: 2021-01-28

## 2021-01-29 ENCOUNTER — TELEPHONE (OUTPATIENT)
Dept: TRANSPLANT | Facility: CLINIC | Age: 71
End: 2021-01-29

## 2021-01-29 DIAGNOSIS — K74.60 HEPATIC CIRRHOSIS, UNSPECIFIED HEPATIC CIRRHOSIS TYPE, UNSPECIFIED WHETHER ASCITES PRESENT: Primary | ICD-10-CM

## 2021-02-03 ENCOUNTER — PATIENT MESSAGE (OUTPATIENT)
Dept: NEPHROLOGY | Facility: CLINIC | Age: 71
End: 2021-02-03

## 2021-02-03 DIAGNOSIS — I15.2 HYPERTENSION ASSOCIATED WITH DIABETES: ICD-10-CM

## 2021-02-03 DIAGNOSIS — E87.6 HYPOKALEMIA: ICD-10-CM

## 2021-02-03 DIAGNOSIS — E11.59 HYPERTENSION ASSOCIATED WITH DIABETES: ICD-10-CM

## 2021-02-03 RX ORDER — CHLORTHALIDONE 25 MG/1
50 TABLET ORAL DAILY
Qty: 90 TABLET | Refills: 3 | Status: SHIPPED | OUTPATIENT
Start: 2021-02-03 | End: 2021-06-21

## 2021-02-03 RX ORDER — POTASSIUM CHLORIDE 750 MG/1
20 TABLET, EXTENDED RELEASE ORAL DAILY
Qty: 90 TABLET | Refills: 3 | Status: SHIPPED | OUTPATIENT
Start: 2021-02-03 | End: 2021-06-21

## 2021-02-05 ENCOUNTER — CLINICAL SUPPORT (OUTPATIENT)
Dept: INTERNAL MEDICINE | Facility: CLINIC | Age: 71
End: 2021-02-05
Payer: MEDICARE

## 2021-02-05 DIAGNOSIS — J30.9 CHRONIC ALLERGIC RHINITIS: ICD-10-CM

## 2021-02-05 PROCEDURE — 95117 PR IMMU2THERAPY, 2+ INJECTIONS: ICD-10-PCS | Mod: S$GLB,,, | Performed by: FAMILY MEDICINE

## 2021-02-05 PROCEDURE — 95117 IMMUNOTHERAPY INJECTIONS: CPT | Mod: S$GLB,,, | Performed by: FAMILY MEDICINE

## 2021-02-05 PROCEDURE — 99499 UNLISTED E&M SERVICE: CPT | Mod: S$GLB,,, | Performed by: ALLERGY & IMMUNOLOGY

## 2021-02-05 PROCEDURE — 99499 NO LOS: ICD-10-PCS | Mod: S$GLB,,, | Performed by: ALLERGY & IMMUNOLOGY

## 2021-02-08 ENCOUNTER — LAB VISIT (OUTPATIENT)
Dept: LAB | Facility: HOSPITAL | Age: 71
End: 2021-02-08
Attending: INTERNAL MEDICINE
Payer: MEDICARE

## 2021-02-08 ENCOUNTER — OFFICE VISIT (OUTPATIENT)
Dept: INTERNAL MEDICINE | Facility: CLINIC | Age: 71
End: 2021-02-08
Payer: MEDICARE

## 2021-02-08 VITALS
TEMPERATURE: 97 F | SYSTOLIC BLOOD PRESSURE: 112 MMHG | BODY MASS INDEX: 42.58 KG/M2 | DIASTOLIC BLOOD PRESSURE: 66 MMHG | WEIGHT: 240.31 LBS | OXYGEN SATURATION: 98 % | HEART RATE: 70 BPM | HEIGHT: 63 IN

## 2021-02-08 DIAGNOSIS — E11.49 TYPE 2 DIABETES MELLITUS WITH NEUROLOGICAL MANIFESTATIONS: ICD-10-CM

## 2021-02-08 DIAGNOSIS — I77.9 BILATERAL CAROTID ARTERY DISEASE, UNSPECIFIED TYPE: ICD-10-CM

## 2021-02-08 DIAGNOSIS — R74.8 ELEVATED LIVER ENZYMES: ICD-10-CM

## 2021-02-08 DIAGNOSIS — R39.89 SUSPECTED UTI: ICD-10-CM

## 2021-02-08 DIAGNOSIS — Z00.00 ANNUAL PHYSICAL EXAM: ICD-10-CM

## 2021-02-08 DIAGNOSIS — E78.5 HYPERLIPIDEMIA ASSOCIATED WITH TYPE 2 DIABETES MELLITUS: ICD-10-CM

## 2021-02-08 DIAGNOSIS — E03.9 HYPOTHYROIDISM, UNSPECIFIED TYPE: ICD-10-CM

## 2021-02-08 DIAGNOSIS — E11.69 HYPERLIPIDEMIA ASSOCIATED WITH TYPE 2 DIABETES MELLITUS: ICD-10-CM

## 2021-02-08 DIAGNOSIS — K74.60 HEPATIC CIRRHOSIS, UNSPECIFIED HEPATIC CIRRHOSIS TYPE, UNSPECIFIED WHETHER ASCITES PRESENT: ICD-10-CM

## 2021-02-08 DIAGNOSIS — E66.01 MORBID OBESITY WITH BODY MASS INDEX (BMI) OF 40.0 TO 44.9 IN ADULT: ICD-10-CM

## 2021-02-08 DIAGNOSIS — E11.42 DIABETIC POLYNEUROPATHY ASSOCIATED WITH TYPE 2 DIABETES MELLITUS: ICD-10-CM

## 2021-02-08 DIAGNOSIS — N30.00 ACUTE CYSTITIS WITHOUT HEMATURIA: ICD-10-CM

## 2021-02-08 DIAGNOSIS — Z23 NEED FOR HEPATITIS B VACCINATION: ICD-10-CM

## 2021-02-08 DIAGNOSIS — I15.2 HYPERTENSION ASSOCIATED WITH DIABETES: ICD-10-CM

## 2021-02-08 DIAGNOSIS — E11.59 HYPERTENSION ASSOCIATED WITH DIABETES: ICD-10-CM

## 2021-02-08 LAB
ALBUMIN SERPL BCP-MCNC: 3.5 G/DL (ref 3.5–5.2)
ALP SERPL-CCNC: 86 U/L (ref 55–135)
ALT SERPL W/O P-5'-P-CCNC: 28 U/L (ref 10–44)
ANION GAP SERPL CALC-SCNC: 12 MMOL/L (ref 8–16)
ANION GAP SERPL CALC-SCNC: 12 MMOL/L (ref 8–16)
AST SERPL-CCNC: 35 U/L (ref 10–40)
BASOPHILS # BLD AUTO: 0.06 K/UL (ref 0–0.2)
BASOPHILS NFR BLD: 0.7 % (ref 0–1.9)
BILIRUB SERPL-MCNC: 0.5 MG/DL (ref 0.1–1)
BILIRUB SERPL-MCNC: NEGATIVE MG/DL
BLOOD URINE, POC: NEGATIVE
BUN SERPL-MCNC: 12 MG/DL (ref 8–23)
BUN SERPL-MCNC: 12 MG/DL (ref 8–23)
CALCIUM SERPL-MCNC: 9.4 MG/DL (ref 8.7–10.5)
CALCIUM SERPL-MCNC: 9.4 MG/DL (ref 8.7–10.5)
CHLORIDE SERPL-SCNC: 101 MMOL/L (ref 95–110)
CHLORIDE SERPL-SCNC: 101 MMOL/L (ref 95–110)
CLARITY, POC UA: CLEAR
CO2 SERPL-SCNC: 29 MMOL/L (ref 23–29)
CO2 SERPL-SCNC: 29 MMOL/L (ref 23–29)
COLOR, POC UA: ABNORMAL
CREAT SERPL-MCNC: 0.7 MG/DL (ref 0.5–1.4)
CREAT SERPL-MCNC: 0.7 MG/DL (ref 0.5–1.4)
DIFFERENTIAL METHOD: ABNORMAL
EOSINOPHIL # BLD AUTO: 0.4 K/UL (ref 0–0.5)
EOSINOPHIL NFR BLD: 4.5 % (ref 0–8)
ERYTHROCYTE [DISTWIDTH] IN BLOOD BY AUTOMATED COUNT: 14.6 % (ref 11.5–14.5)
EST. GFR  (AFRICAN AMERICAN): >60 ML/MIN/1.73 M^2
EST. GFR  (AFRICAN AMERICAN): >60 ML/MIN/1.73 M^2
EST. GFR  (NON AFRICAN AMERICAN): >60 ML/MIN/1.73 M^2
EST. GFR  (NON AFRICAN AMERICAN): >60 ML/MIN/1.73 M^2
ESTIMATED AVG GLUCOSE: 143 MG/DL (ref 68–131)
ESTIMATED AVG GLUCOSE: 143 MG/DL (ref 68–131)
GLUCOSE SERPL-MCNC: 94 MG/DL (ref 70–110)
GLUCOSE SERPL-MCNC: 94 MG/DL (ref 70–110)
GLUCOSE UR QL STRIP: NEGATIVE
HBA1C MFR BLD: 6.6 % (ref 4–5.6)
HBA1C MFR BLD: 6.6 % (ref 4–5.6)
HCT VFR BLD AUTO: 43.6 % (ref 37–48.5)
HGB BLD-MCNC: 13.6 G/DL (ref 12–16)
IMM GRANULOCYTES # BLD AUTO: 0.02 K/UL (ref 0–0.04)
IMM GRANULOCYTES NFR BLD AUTO: 0.2 % (ref 0–0.5)
INR PPP: 1 (ref 0.8–1.2)
KETONES UR QL STRIP: NEGATIVE
LEUKOCYTE ESTERASE URINE, POC: ABNORMAL
LYMPHOCYTES # BLD AUTO: 2.5 K/UL (ref 1–4.8)
LYMPHOCYTES NFR BLD: 26.9 % (ref 18–48)
MCH RBC QN AUTO: 30.4 PG (ref 27–31)
MCHC RBC AUTO-ENTMCNC: 31.2 G/DL (ref 32–36)
MCV RBC AUTO: 98 FL (ref 82–98)
MONOCYTES # BLD AUTO: 0.9 K/UL (ref 0.3–1)
MONOCYTES NFR BLD: 9.8 % (ref 4–15)
NEUTROPHILS # BLD AUTO: 5.3 K/UL (ref 1.8–7.7)
NEUTROPHILS NFR BLD: 57.9 % (ref 38–73)
NITRITE, POC UA: ABNORMAL
NRBC BLD-RTO: 0 /100 WBC
PH, POC UA: 7
PLATELET # BLD AUTO: 307 K/UL (ref 150–350)
PMV BLD AUTO: 10.7 FL (ref 9.2–12.9)
POTASSIUM SERPL-SCNC: 3.5 MMOL/L (ref 3.5–5.1)
POTASSIUM SERPL-SCNC: 3.5 MMOL/L (ref 3.5–5.1)
PROT SERPL-MCNC: 8 G/DL (ref 6–8.4)
PROTEIN, POC: NEGATIVE
PROTHROMBIN TIME: 11.1 SEC (ref 9–12.5)
RBC # BLD AUTO: 4.47 M/UL (ref 4–5.4)
SODIUM SERPL-SCNC: 142 MMOL/L (ref 136–145)
SODIUM SERPL-SCNC: 142 MMOL/L (ref 136–145)
SPECIFIC GRAVITY, POC UA: 1.01
UROBILINOGEN, POC UA: NEGATIVE
WBC # BLD AUTO: 9.21 K/UL (ref 3.9–12.7)

## 2021-02-08 PROCEDURE — 36415 COLL VENOUS BLD VENIPUNCTURE: CPT | Mod: PO

## 2021-02-08 PROCEDURE — 86039 ANTINUCLEAR ANTIBODIES (ANA): CPT

## 2021-02-08 PROCEDURE — 80053 COMPREHEN METABOLIC PANEL: CPT

## 2021-02-08 PROCEDURE — 1157F ADVNC CARE PLAN IN RCRD: CPT | Mod: S$GLB,,, | Performed by: INTERNAL MEDICINE

## 2021-02-08 PROCEDURE — 90746 HEPB VACCINE 3 DOSE ADULT IM: CPT | Mod: S$GLB,,, | Performed by: INTERNAL MEDICINE

## 2021-02-08 PROCEDURE — G0010 HEPATITIS B VACCINE ADULT IM: ICD-10-PCS | Mod: S$GLB,,, | Performed by: INTERNAL MEDICINE

## 2021-02-08 PROCEDURE — 3008F BODY MASS INDEX DOCD: CPT | Mod: CPTII,S$GLB,, | Performed by: INTERNAL MEDICINE

## 2021-02-08 PROCEDURE — 1101F PR PT FALLS ASSESS DOC 0-1 FALLS W/OUT INJ PAST YR: ICD-10-PCS | Mod: CPTII,S$GLB,, | Performed by: INTERNAL MEDICINE

## 2021-02-08 PROCEDURE — 90746 HEPATITIS B VACCINE ADULT IM: ICD-10-PCS | Mod: S$GLB,,, | Performed by: INTERNAL MEDICINE

## 2021-02-08 PROCEDURE — 81002 POCT URINE DIPSTICK WITHOUT MICROSCOPE: ICD-10-PCS | Mod: S$GLB,,, | Performed by: INTERNAL MEDICINE

## 2021-02-08 PROCEDURE — 86235 NUCLEAR ANTIGEN ANTIBODY: CPT | Mod: 59

## 2021-02-08 PROCEDURE — 99999 PR PBB SHADOW E&M-EST. PATIENT-LVL V: ICD-10-PCS | Mod: PBBFAC,,, | Performed by: INTERNAL MEDICINE

## 2021-02-08 PROCEDURE — 3078F DIAST BP <80 MM HG: CPT | Mod: CPTII,S$GLB,, | Performed by: INTERNAL MEDICINE

## 2021-02-08 PROCEDURE — 3051F PR MOST RECENT HEMOGLOBIN A1C LEVEL 7.0 - < 8.0%: ICD-10-PCS | Mod: CPTII,S$GLB,, | Performed by: INTERNAL MEDICINE

## 2021-02-08 PROCEDURE — 3078F PR MOST RECENT DIASTOLIC BLOOD PRESSURE < 80 MM HG: ICD-10-PCS | Mod: CPTII,S$GLB,, | Performed by: INTERNAL MEDICINE

## 2021-02-08 PROCEDURE — 3074F PR MOST RECENT SYSTOLIC BLOOD PRESSURE < 130 MM HG: ICD-10-PCS | Mod: CPTII,S$GLB,, | Performed by: INTERNAL MEDICINE

## 2021-02-08 PROCEDURE — 85025 COMPLETE CBC W/AUTO DIFF WBC: CPT

## 2021-02-08 PROCEDURE — 3288F PR FALLS RISK ASSESSMENT DOCUMENTED: ICD-10-PCS | Mod: CPTII,S$GLB,, | Performed by: INTERNAL MEDICINE

## 2021-02-08 PROCEDURE — 1157F PR ADVANCE CARE PLAN OR EQUIV PRESENT IN MEDICAL RECORD: ICD-10-PCS | Mod: S$GLB,,, | Performed by: INTERNAL MEDICINE

## 2021-02-08 PROCEDURE — 1125F PR PAIN SEVERITY QUANTIFIED, PAIN PRESENT: ICD-10-PCS | Mod: S$GLB,,, | Performed by: INTERNAL MEDICINE

## 2021-02-08 PROCEDURE — 82105 ALPHA-FETOPROTEIN SERUM: CPT

## 2021-02-08 PROCEDURE — 86038 ANTINUCLEAR ANTIBODIES: CPT

## 2021-02-08 PROCEDURE — 83036 HEMOGLOBIN GLYCOSYLATED A1C: CPT

## 2021-02-08 PROCEDURE — 3008F PR BODY MASS INDEX (BMI) DOCUMENTED: ICD-10-PCS | Mod: CPTII,S$GLB,, | Performed by: INTERNAL MEDICINE

## 2021-02-08 PROCEDURE — 3288F FALL RISK ASSESSMENT DOCD: CPT | Mod: CPTII,S$GLB,, | Performed by: INTERNAL MEDICINE

## 2021-02-08 PROCEDURE — G0010 ADMIN HEPATITIS B VACCINE: HCPCS | Mod: S$GLB,,, | Performed by: INTERNAL MEDICINE

## 2021-02-08 PROCEDURE — 85610 PROTHROMBIN TIME: CPT

## 2021-02-08 PROCEDURE — 3074F SYST BP LT 130 MM HG: CPT | Mod: CPTII,S$GLB,, | Performed by: INTERNAL MEDICINE

## 2021-02-08 PROCEDURE — 1101F PT FALLS ASSESS-DOCD LE1/YR: CPT | Mod: CPTII,S$GLB,, | Performed by: INTERNAL MEDICINE

## 2021-02-08 PROCEDURE — 99999 PR PBB SHADOW E&M-EST. PATIENT-LVL V: CPT | Mod: PBBFAC,,, | Performed by: INTERNAL MEDICINE

## 2021-02-08 PROCEDURE — 99397 PR PREVENTIVE VISIT,EST,65 & OVER: ICD-10-PCS | Mod: 25,S$GLB,, | Performed by: INTERNAL MEDICINE

## 2021-02-08 PROCEDURE — 99397 PER PM REEVAL EST PAT 65+ YR: CPT | Mod: 25,S$GLB,, | Performed by: INTERNAL MEDICINE

## 2021-02-08 PROCEDURE — 81002 URINALYSIS NONAUTO W/O SCOPE: CPT | Mod: S$GLB,,, | Performed by: INTERNAL MEDICINE

## 2021-02-08 PROCEDURE — 3051F HG A1C>EQUAL 7.0%<8.0%: CPT | Mod: CPTII,S$GLB,, | Performed by: INTERNAL MEDICINE

## 2021-02-08 PROCEDURE — 1125F AMNT PAIN NOTED PAIN PRSNT: CPT | Mod: S$GLB,,, | Performed by: INTERNAL MEDICINE

## 2021-02-08 RX ORDER — NITROFURANTOIN 25; 75 MG/1; MG/1
100 CAPSULE ORAL 2 TIMES DAILY
Qty: 10 CAPSULE | Refills: 0 | Status: SHIPPED | OUTPATIENT
Start: 2021-02-08 | End: 2021-02-13

## 2021-02-08 RX ORDER — TOBRAMYCIN 1.2 G/30ML
INJECTION, POWDER, LYOPHILIZED, FOR SOLUTION INTRAVENOUS
COMMUNITY
Start: 2020-12-14 | End: 2021-02-08 | Stop reason: CLARIF

## 2021-02-09 LAB
AFP SERPL-MCNC: 10 NG/ML (ref 0–8.4)
ANA PATTERN 1: NORMAL
ANA PATTERN 2: NORMAL
ANA SER QL IF: POSITIVE
ANA TITER 2: NORMAL
ANA TITR SER IF: NORMAL {TITER}

## 2021-02-10 ENCOUNTER — HOSPITAL ENCOUNTER (OUTPATIENT)
Dept: RADIOLOGY | Facility: HOSPITAL | Age: 71
Discharge: HOME | End: 2021-02-10
Attending: PHYSICIAN ASSISTANT
Payer: MEDICARE

## 2021-02-10 ENCOUNTER — HOSPITAL ENCOUNTER (OUTPATIENT)
Dept: RADIOLOGY | Facility: HOSPITAL | Age: 71
Discharge: HOME | End: 2021-02-10
Attending: INTERNAL MEDICINE
Payer: MEDICARE

## 2021-02-10 DIAGNOSIS — E04.1 THYROID NODULE: ICD-10-CM

## 2021-02-10 DIAGNOSIS — Z96.651 HISTORY OF TOTAL RIGHT KNEE REPLACEMENT: ICD-10-CM

## 2021-02-10 PROCEDURE — 76536 US EXAM OF HEAD AND NECK: CPT | Mod: 26,,, | Performed by: INTERNAL MEDICINE

## 2021-02-10 PROCEDURE — 76536 US SOFT TISSUE HEAD NECK THYROID: ICD-10-PCS | Mod: 26,,, | Performed by: INTERNAL MEDICINE

## 2021-02-10 PROCEDURE — 76536 US EXAM OF HEAD AND NECK: CPT | Mod: TC

## 2021-02-11 LAB
ANTI SM ANTIBODY: 0.08 RATIO (ref 0–0.99)
ANTI SM/RNP ANTIBODY: 0.07 RATIO (ref 0–0.99)
ANTI-SM INTERPRETATION: NEGATIVE
ANTI-SM/RNP INTERPRETATION: NEGATIVE
ANTI-SSA ANTIBODY: 0.06 RATIO (ref 0–0.99)
ANTI-SSA INTERPRETATION: NEGATIVE
ANTI-SSB ANTIBODY: 0.06 RATIO (ref 0–0.99)
ANTI-SSB INTERPRETATION: NEGATIVE
DSDNA AB SER-ACNC: NORMAL [IU]/ML

## 2021-02-16 PROCEDURE — 99454 REM MNTR PHYSIOL PARAM 16-30: CPT | Mod: S$GLB,,, | Performed by: INTERNAL MEDICINE

## 2021-02-16 PROCEDURE — 99454 PR REMOTE MNTR, PHYS PARAM, INITIAL, EA 30 DAYS: ICD-10-PCS | Mod: S$GLB,,, | Performed by: INTERNAL MEDICINE

## 2021-02-24 ENCOUNTER — IMMUNIZATION (OUTPATIENT)
Dept: PHARMACY | Facility: CLINIC | Age: 71
End: 2021-02-24
Payer: MEDICARE

## 2021-02-24 ENCOUNTER — PES CALL (OUTPATIENT)
Dept: ADMINISTRATIVE | Facility: CLINIC | Age: 71
End: 2021-02-24

## 2021-02-24 DIAGNOSIS — Z23 NEED FOR VACCINATION: Primary | ICD-10-CM

## 2021-02-25 ENCOUNTER — OFFICE VISIT (OUTPATIENT)
Dept: HEPATOLOGY | Facility: CLINIC | Age: 71
End: 2021-02-25
Payer: MEDICARE

## 2021-02-25 VITALS
WEIGHT: 239.63 LBS | HEART RATE: 70 BPM | HEIGHT: 62 IN | DIASTOLIC BLOOD PRESSURE: 58 MMHG | BODY MASS INDEX: 44.1 KG/M2 | OXYGEN SATURATION: 98 % | TEMPERATURE: 98 F | RESPIRATION RATE: 17 BRPM | SYSTOLIC BLOOD PRESSURE: 127 MMHG

## 2021-02-25 DIAGNOSIS — K74.60 HEPATIC CIRRHOSIS, UNSPECIFIED HEPATIC CIRRHOSIS TYPE, UNSPECIFIED WHETHER ASCITES PRESENT: Primary | ICD-10-CM

## 2021-02-25 PROCEDURE — 3288F PR FALLS RISK ASSESSMENT DOCUMENTED: ICD-10-PCS | Mod: CPTII,S$GLB,, | Performed by: INTERNAL MEDICINE

## 2021-02-25 PROCEDURE — 1157F ADVNC CARE PLAN IN RCRD: CPT | Mod: S$GLB,,, | Performed by: INTERNAL MEDICINE

## 2021-02-25 PROCEDURE — 3008F BODY MASS INDEX DOCD: CPT | Mod: CPTII,S$GLB,, | Performed by: INTERNAL MEDICINE

## 2021-02-25 PROCEDURE — 99214 OFFICE O/P EST MOD 30 MIN: CPT | Mod: S$GLB,,, | Performed by: INTERNAL MEDICINE

## 2021-02-25 PROCEDURE — 1101F PR PT FALLS ASSESS DOC 0-1 FALLS W/OUT INJ PAST YR: ICD-10-PCS | Mod: CPTII,S$GLB,, | Performed by: INTERNAL MEDICINE

## 2021-02-25 PROCEDURE — 1125F PR PAIN SEVERITY QUANTIFIED, PAIN PRESENT: ICD-10-PCS | Mod: S$GLB,,, | Performed by: INTERNAL MEDICINE

## 2021-02-25 PROCEDURE — 3074F PR MOST RECENT SYSTOLIC BLOOD PRESSURE < 130 MM HG: ICD-10-PCS | Mod: CPTII,S$GLB,, | Performed by: INTERNAL MEDICINE

## 2021-02-25 PROCEDURE — 1159F MED LIST DOCD IN RCRD: CPT | Mod: S$GLB,,, | Performed by: INTERNAL MEDICINE

## 2021-02-25 PROCEDURE — 1101F PT FALLS ASSESS-DOCD LE1/YR: CPT | Mod: CPTII,S$GLB,, | Performed by: INTERNAL MEDICINE

## 2021-02-25 PROCEDURE — 99214 PR OFFICE/OUTPT VISIT, EST, LEVL IV, 30-39 MIN: ICD-10-PCS | Mod: S$GLB,,, | Performed by: INTERNAL MEDICINE

## 2021-02-25 PROCEDURE — 1157F PR ADVANCE CARE PLAN OR EQUIV PRESENT IN MEDICAL RECORD: ICD-10-PCS | Mod: S$GLB,,, | Performed by: INTERNAL MEDICINE

## 2021-02-25 PROCEDURE — 1159F PR MEDICATION LIST DOCUMENTED IN MEDICAL RECORD: ICD-10-PCS | Mod: S$GLB,,, | Performed by: INTERNAL MEDICINE

## 2021-02-25 PROCEDURE — 99999 PR PBB SHADOW E&M-EST. PATIENT-LVL V: CPT | Mod: PBBFAC,,, | Performed by: INTERNAL MEDICINE

## 2021-02-25 PROCEDURE — 3074F SYST BP LT 130 MM HG: CPT | Mod: CPTII,S$GLB,, | Performed by: INTERNAL MEDICINE

## 2021-02-25 PROCEDURE — 3008F PR BODY MASS INDEX (BMI) DOCUMENTED: ICD-10-PCS | Mod: CPTII,S$GLB,, | Performed by: INTERNAL MEDICINE

## 2021-02-25 PROCEDURE — 3078F DIAST BP <80 MM HG: CPT | Mod: CPTII,S$GLB,, | Performed by: INTERNAL MEDICINE

## 2021-02-25 PROCEDURE — 3288F FALL RISK ASSESSMENT DOCD: CPT | Mod: CPTII,S$GLB,, | Performed by: INTERNAL MEDICINE

## 2021-02-25 PROCEDURE — 3078F PR MOST RECENT DIASTOLIC BLOOD PRESSURE < 80 MM HG: ICD-10-PCS | Mod: CPTII,S$GLB,, | Performed by: INTERNAL MEDICINE

## 2021-02-25 PROCEDURE — 99999 PR PBB SHADOW E&M-EST. PATIENT-LVL V: ICD-10-PCS | Mod: PBBFAC,,, | Performed by: INTERNAL MEDICINE

## 2021-02-25 PROCEDURE — 1125F AMNT PAIN NOTED PAIN PRSNT: CPT | Mod: S$GLB,,, | Performed by: INTERNAL MEDICINE

## 2021-02-26 ENCOUNTER — OFFICE VISIT (OUTPATIENT)
Dept: UROLOGY | Facility: CLINIC | Age: 71
End: 2021-02-26
Payer: MEDICARE

## 2021-02-26 VITALS
HEART RATE: 87 BPM | BODY MASS INDEX: 43.63 KG/M2 | WEIGHT: 238.56 LBS | DIASTOLIC BLOOD PRESSURE: 74 MMHG | SYSTOLIC BLOOD PRESSURE: 145 MMHG

## 2021-02-26 DIAGNOSIS — N39.0 RECURRENT UTI: ICD-10-CM

## 2021-02-26 DIAGNOSIS — R31.9 URINARY TRACT INFECTION WITH HEMATURIA, SITE UNSPECIFIED: Primary | ICD-10-CM

## 2021-02-26 DIAGNOSIS — N39.0 URINARY TRACT INFECTION WITH HEMATURIA, SITE UNSPECIFIED: Primary | ICD-10-CM

## 2021-02-26 LAB
BILIRUB SERPL-MCNC: ABNORMAL MG/DL
BLOOD URINE, POC: ABNORMAL
CLARITY, POC UA: ABNORMAL
COLOR, POC UA: YELLOW
GLUCOSE UR QL STRIP: ABNORMAL
KETONES UR QL STRIP: ABNORMAL
LEUKOCYTE ESTERASE URINE, POC: ABNORMAL
NITRITE, POC UA: ABNORMAL
PH, POC UA: 5
PROTEIN, POC: ABNORMAL
SPECIFIC GRAVITY, POC UA: 1.01
UROBILINOGEN, POC UA: ABNORMAL

## 2021-02-26 PROCEDURE — 99999 PR PBB SHADOW E&M-EST. PATIENT-LVL V: CPT | Mod: PBBFAC,,, | Performed by: STUDENT IN AN ORGANIZED HEALTH CARE EDUCATION/TRAINING PROGRAM

## 2021-02-26 PROCEDURE — 3078F DIAST BP <80 MM HG: CPT | Mod: CPTII,S$GLB,, | Performed by: STUDENT IN AN ORGANIZED HEALTH CARE EDUCATION/TRAINING PROGRAM

## 2021-02-26 PROCEDURE — 81002 POCT URINE DIPSTICK WITHOUT MICROSCOPE: ICD-10-PCS | Mod: S$GLB,,, | Performed by: STUDENT IN AN ORGANIZED HEALTH CARE EDUCATION/TRAINING PROGRAM

## 2021-02-26 PROCEDURE — 99999 PR PBB SHADOW E&M-EST. PATIENT-LVL V: ICD-10-PCS | Mod: PBBFAC,,, | Performed by: STUDENT IN AN ORGANIZED HEALTH CARE EDUCATION/TRAINING PROGRAM

## 2021-02-26 PROCEDURE — 3077F SYST BP >= 140 MM HG: CPT | Mod: CPTII,S$GLB,, | Performed by: STUDENT IN AN ORGANIZED HEALTH CARE EDUCATION/TRAINING PROGRAM

## 2021-02-26 PROCEDURE — 3077F PR MOST RECENT SYSTOLIC BLOOD PRESSURE >= 140 MM HG: ICD-10-PCS | Mod: CPTII,S$GLB,, | Performed by: STUDENT IN AN ORGANIZED HEALTH CARE EDUCATION/TRAINING PROGRAM

## 2021-02-26 PROCEDURE — 3008F PR BODY MASS INDEX (BMI) DOCUMENTED: ICD-10-PCS | Mod: CPTII,S$GLB,, | Performed by: STUDENT IN AN ORGANIZED HEALTH CARE EDUCATION/TRAINING PROGRAM

## 2021-02-26 PROCEDURE — 81002 URINALYSIS NONAUTO W/O SCOPE: CPT | Mod: S$GLB,,, | Performed by: STUDENT IN AN ORGANIZED HEALTH CARE EDUCATION/TRAINING PROGRAM

## 2021-02-26 PROCEDURE — 1157F ADVNC CARE PLAN IN RCRD: CPT | Mod: S$GLB,,, | Performed by: STUDENT IN AN ORGANIZED HEALTH CARE EDUCATION/TRAINING PROGRAM

## 2021-02-26 PROCEDURE — 99214 OFFICE O/P EST MOD 30 MIN: CPT | Mod: 25,S$GLB,, | Performed by: STUDENT IN AN ORGANIZED HEALTH CARE EDUCATION/TRAINING PROGRAM

## 2021-02-26 PROCEDURE — 3008F BODY MASS INDEX DOCD: CPT | Mod: CPTII,S$GLB,, | Performed by: STUDENT IN AN ORGANIZED HEALTH CARE EDUCATION/TRAINING PROGRAM

## 2021-02-26 PROCEDURE — 1125F PR PAIN SEVERITY QUANTIFIED, PAIN PRESENT: ICD-10-PCS | Mod: S$GLB,,, | Performed by: STUDENT IN AN ORGANIZED HEALTH CARE EDUCATION/TRAINING PROGRAM

## 2021-02-26 PROCEDURE — 1159F PR MEDICATION LIST DOCUMENTED IN MEDICAL RECORD: ICD-10-PCS | Mod: S$GLB,,, | Performed by: STUDENT IN AN ORGANIZED HEALTH CARE EDUCATION/TRAINING PROGRAM

## 2021-02-26 PROCEDURE — 1159F MED LIST DOCD IN RCRD: CPT | Mod: S$GLB,,, | Performed by: STUDENT IN AN ORGANIZED HEALTH CARE EDUCATION/TRAINING PROGRAM

## 2021-02-26 PROCEDURE — 1125F AMNT PAIN NOTED PAIN PRSNT: CPT | Mod: S$GLB,,, | Performed by: STUDENT IN AN ORGANIZED HEALTH CARE EDUCATION/TRAINING PROGRAM

## 2021-02-26 PROCEDURE — 1157F PR ADVANCE CARE PLAN OR EQUIV PRESENT IN MEDICAL RECORD: ICD-10-PCS | Mod: S$GLB,,, | Performed by: STUDENT IN AN ORGANIZED HEALTH CARE EDUCATION/TRAINING PROGRAM

## 2021-02-26 PROCEDURE — 3078F PR MOST RECENT DIASTOLIC BLOOD PRESSURE < 80 MM HG: ICD-10-PCS | Mod: CPTII,S$GLB,, | Performed by: STUDENT IN AN ORGANIZED HEALTH CARE EDUCATION/TRAINING PROGRAM

## 2021-02-26 PROCEDURE — 99214 PR OFFICE/OUTPT VISIT, EST, LEVL IV, 30-39 MIN: ICD-10-PCS | Mod: 25,S$GLB,, | Performed by: STUDENT IN AN ORGANIZED HEALTH CARE EDUCATION/TRAINING PROGRAM

## 2021-02-26 RX ORDER — CONJUGATED ESTROGENS 0.62 MG/G
0.5 CREAM VAGINAL
Qty: 30 G | Refills: 11 | Status: SHIPPED | OUTPATIENT
Start: 2021-02-26 | End: 2021-03-05 | Stop reason: ALTCHOICE

## 2021-03-01 DIAGNOSIS — K21.9 GASTROESOPHAGEAL REFLUX DISEASE: ICD-10-CM

## 2021-03-01 DIAGNOSIS — E03.9 HYPOTHYROIDISM, UNSPECIFIED TYPE: ICD-10-CM

## 2021-03-01 RX ORDER — OMEPRAZOLE 20 MG/1
20 CAPSULE, DELAYED RELEASE ORAL DAILY PRN
Qty: 90 CAPSULE | Refills: 0 | Status: SHIPPED | OUTPATIENT
Start: 2021-03-01 | End: 2021-06-22

## 2021-03-01 RX ORDER — LEVOTHYROXINE SODIUM 75 UG/1
75 TABLET ORAL DAILY
Qty: 90 TABLET | Refills: 0 | Status: SHIPPED | OUTPATIENT
Start: 2021-03-01 | End: 2021-05-10

## 2021-03-05 ENCOUNTER — CLINICAL SUPPORT (OUTPATIENT)
Dept: INTERNAL MEDICINE | Facility: CLINIC | Age: 71
End: 2021-03-05
Payer: MEDICARE

## 2021-03-05 ENCOUNTER — OFFICE VISIT (OUTPATIENT)
Dept: UROLOGY | Facility: CLINIC | Age: 71
End: 2021-03-05
Payer: MEDICARE

## 2021-03-05 VITALS
HEART RATE: 75 BPM | HEIGHT: 62 IN | WEIGHT: 238.13 LBS | SYSTOLIC BLOOD PRESSURE: 129 MMHG | DIASTOLIC BLOOD PRESSURE: 64 MMHG | BODY MASS INDEX: 43.82 KG/M2

## 2021-03-05 DIAGNOSIS — J30.9 CHRONIC ALLERGIC RHINITIS: ICD-10-CM

## 2021-03-05 DIAGNOSIS — N95.2 VAGINAL ATROPHY: ICD-10-CM

## 2021-03-05 DIAGNOSIS — N39.0 RECURRENT UTI: Primary | ICD-10-CM

## 2021-03-05 PROCEDURE — 99999 PR PBB SHADOW E&M-EST. PATIENT-LVL I: CPT | Mod: PBBFAC,,,

## 2021-03-05 PROCEDURE — 99214 OFFICE O/P EST MOD 30 MIN: CPT | Mod: 25,S$GLB,, | Performed by: UROLOGY

## 2021-03-05 PROCEDURE — 1159F MED LIST DOCD IN RCRD: CPT | Mod: S$GLB,,, | Performed by: UROLOGY

## 2021-03-05 PROCEDURE — 99999 PR PBB SHADOW E&M-EST. PATIENT-LVL V: ICD-10-PCS | Mod: PBBFAC,,, | Performed by: UROLOGY

## 2021-03-05 PROCEDURE — 3078F PR MOST RECENT DIASTOLIC BLOOD PRESSURE < 80 MM HG: ICD-10-PCS | Mod: CPTII,S$GLB,, | Performed by: UROLOGY

## 2021-03-05 PROCEDURE — 87086 URINE CULTURE/COLONY COUNT: CPT | Performed by: UROLOGY

## 2021-03-05 PROCEDURE — 99214 PR OFFICE/OUTPT VISIT, EST, LEVL IV, 30-39 MIN: ICD-10-PCS | Mod: 25,S$GLB,, | Performed by: UROLOGY

## 2021-03-05 PROCEDURE — 3074F PR MOST RECENT SYSTOLIC BLOOD PRESSURE < 130 MM HG: ICD-10-PCS | Mod: CPTII,S$GLB,, | Performed by: UROLOGY

## 2021-03-05 PROCEDURE — 1157F PR ADVANCE CARE PLAN OR EQUIV PRESENT IN MEDICAL RECORD: ICD-10-PCS | Mod: S$GLB,,, | Performed by: UROLOGY

## 2021-03-05 PROCEDURE — 1101F PR PT FALLS ASSESS DOC 0-1 FALLS W/OUT INJ PAST YR: ICD-10-PCS | Mod: CPTII,S$GLB,, | Performed by: UROLOGY

## 2021-03-05 PROCEDURE — 1101F PT FALLS ASSESS-DOCD LE1/YR: CPT | Mod: CPTII,S$GLB,, | Performed by: UROLOGY

## 2021-03-05 PROCEDURE — 1125F PR PAIN SEVERITY QUANTIFIED, PAIN PRESENT: ICD-10-PCS | Mod: S$GLB,,, | Performed by: UROLOGY

## 2021-03-05 PROCEDURE — 99999 PR PBB SHADOW E&M-EST. PATIENT-LVL V: CPT | Mod: PBBFAC,,, | Performed by: UROLOGY

## 2021-03-05 PROCEDURE — 3008F BODY MASS INDEX DOCD: CPT | Mod: CPTII,S$GLB,, | Performed by: UROLOGY

## 2021-03-05 PROCEDURE — 95115 PR IMMUNOTHERAPY, ONE INJECTION: ICD-10-PCS | Mod: S$GLB,,, | Performed by: FAMILY MEDICINE

## 2021-03-05 PROCEDURE — 51701 PR INSERTION OF NON-INDWELLING BLADDER CATHETERIZATION FOR RESIDUAL UR: ICD-10-PCS | Mod: S$GLB,,, | Performed by: UROLOGY

## 2021-03-05 PROCEDURE — 3288F PR FALLS RISK ASSESSMENT DOCUMENTED: ICD-10-PCS | Mod: CPTII,S$GLB,, | Performed by: UROLOGY

## 2021-03-05 PROCEDURE — 99999 PR PBB SHADOW E&M-EST. PATIENT-LVL I: ICD-10-PCS | Mod: PBBFAC,,,

## 2021-03-05 PROCEDURE — 3008F PR BODY MASS INDEX (BMI) DOCUMENTED: ICD-10-PCS | Mod: CPTII,S$GLB,, | Performed by: UROLOGY

## 2021-03-05 PROCEDURE — 87186 SC STD MICRODIL/AGAR DIL: CPT | Performed by: UROLOGY

## 2021-03-05 PROCEDURE — 87088 URINE BACTERIA CULTURE: CPT | Performed by: UROLOGY

## 2021-03-05 PROCEDURE — 95115 IMMUNOTHERAPY ONE INJECTION: CPT | Mod: S$GLB,,, | Performed by: FAMILY MEDICINE

## 2021-03-05 PROCEDURE — 1157F ADVNC CARE PLAN IN RCRD: CPT | Mod: S$GLB,,, | Performed by: UROLOGY

## 2021-03-05 PROCEDURE — 1125F AMNT PAIN NOTED PAIN PRSNT: CPT | Mod: S$GLB,,, | Performed by: UROLOGY

## 2021-03-05 PROCEDURE — 99499 UNLISTED E&M SERVICE: CPT | Mod: S$GLB,,, | Performed by: ALLERGY & IMMUNOLOGY

## 2021-03-05 PROCEDURE — 51701 INSERT BLADDER CATHETER: CPT | Mod: S$GLB,,, | Performed by: UROLOGY

## 2021-03-05 PROCEDURE — 87077 CULTURE AEROBIC IDENTIFY: CPT | Performed by: UROLOGY

## 2021-03-05 PROCEDURE — 99499 NO LOS: ICD-10-PCS | Mod: S$GLB,,, | Performed by: ALLERGY & IMMUNOLOGY

## 2021-03-05 PROCEDURE — 3288F FALL RISK ASSESSMENT DOCD: CPT | Mod: CPTII,S$GLB,, | Performed by: UROLOGY

## 2021-03-05 PROCEDURE — 3078F DIAST BP <80 MM HG: CPT | Mod: CPTII,S$GLB,, | Performed by: UROLOGY

## 2021-03-05 PROCEDURE — 3074F SYST BP LT 130 MM HG: CPT | Mod: CPTII,S$GLB,, | Performed by: UROLOGY

## 2021-03-05 PROCEDURE — 1159F PR MEDICATION LIST DOCUMENTED IN MEDICAL RECORD: ICD-10-PCS | Mod: S$GLB,,, | Performed by: UROLOGY

## 2021-03-05 RX ORDER — DOXYCYCLINE 100 MG/1
100 CAPSULE ORAL 2 TIMES DAILY
Qty: 14 CAPSULE | Refills: 0 | Status: SHIPPED | OUTPATIENT
Start: 2021-03-05 | End: 2021-03-12

## 2021-03-07 LAB — BACTERIA UR CULT: ABNORMAL

## 2021-03-08 ENCOUNTER — OFFICE VISIT (OUTPATIENT)
Dept: ENDOCRINOLOGY | Facility: CLINIC | Age: 71
End: 2021-03-08
Payer: MEDICARE

## 2021-03-08 VITALS
HEIGHT: 62 IN | RESPIRATION RATE: 18 BRPM | DIASTOLIC BLOOD PRESSURE: 62 MMHG | HEART RATE: 71 BPM | BODY MASS INDEX: 43.56 KG/M2 | WEIGHT: 236.69 LBS | OXYGEN SATURATION: 99 % | SYSTOLIC BLOOD PRESSURE: 130 MMHG

## 2021-03-08 DIAGNOSIS — Z79.4 TYPE 2 DIABETES MELLITUS WITH STAGE 3A CHRONIC KIDNEY DISEASE, WITH LONG-TERM CURRENT USE OF INSULIN: Primary | ICD-10-CM

## 2021-03-08 DIAGNOSIS — Z79.4 TYPE 2 DIABETES MELLITUS WITH HYPERGLYCEMIA, WITH LONG-TERM CURRENT USE OF INSULIN: ICD-10-CM

## 2021-03-08 DIAGNOSIS — E04.1 THYROID NODULE: ICD-10-CM

## 2021-03-08 DIAGNOSIS — Z79.4 TYPE 2 DIABETES MELLITUS WITH STAGE 3A CHRONIC KIDNEY DISEASE, WITH LONG-TERM CURRENT USE OF INSULIN: ICD-10-CM

## 2021-03-08 DIAGNOSIS — N18.31 TYPE 2 DIABETES MELLITUS WITH STAGE 3A CHRONIC KIDNEY DISEASE, WITH LONG-TERM CURRENT USE OF INSULIN: ICD-10-CM

## 2021-03-08 DIAGNOSIS — E11.65 TYPE 2 DIABETES MELLITUS WITH HYPERGLYCEMIA, WITH LONG-TERM CURRENT USE OF INSULIN: ICD-10-CM

## 2021-03-08 DIAGNOSIS — E03.9 HYPOTHYROIDISM, UNSPECIFIED TYPE: ICD-10-CM

## 2021-03-08 DIAGNOSIS — E11.22 TYPE 2 DIABETES MELLITUS WITH STAGE 3A CHRONIC KIDNEY DISEASE, WITH LONG-TERM CURRENT USE OF INSULIN: Primary | ICD-10-CM

## 2021-03-08 DIAGNOSIS — N18.31 TYPE 2 DIABETES MELLITUS WITH STAGE 3A CHRONIC KIDNEY DISEASE, WITH LONG-TERM CURRENT USE OF INSULIN: Primary | ICD-10-CM

## 2021-03-08 DIAGNOSIS — E11.22 TYPE 2 DIABETES MELLITUS WITH STAGE 3A CHRONIC KIDNEY DISEASE, WITH LONG-TERM CURRENT USE OF INSULIN: ICD-10-CM

## 2021-03-08 DIAGNOSIS — M85.80 OSTEOPENIA, UNSPECIFIED LOCATION: ICD-10-CM

## 2021-03-08 DIAGNOSIS — E66.01 MORBID OBESITY WITH BODY MASS INDEX (BMI) OF 40.0 TO 44.9 IN ADULT: ICD-10-CM

## 2021-03-08 PROCEDURE — 3044F PR MOST RECENT HEMOGLOBIN A1C LEVEL <7.0%: ICD-10-PCS | Mod: CPTII,S$GLB,, | Performed by: INTERNAL MEDICINE

## 2021-03-08 PROCEDURE — 3288F PR FALLS RISK ASSESSMENT DOCUMENTED: ICD-10-PCS | Mod: CPTII,S$GLB,, | Performed by: INTERNAL MEDICINE

## 2021-03-08 PROCEDURE — 3044F HG A1C LEVEL LT 7.0%: CPT | Mod: CPTII,S$GLB,, | Performed by: INTERNAL MEDICINE

## 2021-03-08 PROCEDURE — 3078F PR MOST RECENT DIASTOLIC BLOOD PRESSURE < 80 MM HG: ICD-10-PCS | Mod: CPTII,S$GLB,, | Performed by: INTERNAL MEDICINE

## 2021-03-08 PROCEDURE — 3075F SYST BP GE 130 - 139MM HG: CPT | Mod: CPTII,S$GLB,, | Performed by: INTERNAL MEDICINE

## 2021-03-08 PROCEDURE — 3008F PR BODY MASS INDEX (BMI) DOCUMENTED: ICD-10-PCS | Mod: CPTII,S$GLB,, | Performed by: INTERNAL MEDICINE

## 2021-03-08 PROCEDURE — 1101F PT FALLS ASSESS-DOCD LE1/YR: CPT | Mod: CPTII,S$GLB,, | Performed by: INTERNAL MEDICINE

## 2021-03-08 PROCEDURE — 1157F PR ADVANCE CARE PLAN OR EQUIV PRESENT IN MEDICAL RECORD: ICD-10-PCS | Mod: S$GLB,,, | Performed by: INTERNAL MEDICINE

## 2021-03-08 PROCEDURE — 99999 PR PBB SHADOW E&M-EST. PATIENT-LVL V: ICD-10-PCS | Mod: PBBFAC,,, | Performed by: INTERNAL MEDICINE

## 2021-03-08 PROCEDURE — 3078F DIAST BP <80 MM HG: CPT | Mod: CPTII,S$GLB,, | Performed by: INTERNAL MEDICINE

## 2021-03-08 PROCEDURE — 1126F AMNT PAIN NOTED NONE PRSNT: CPT | Mod: S$GLB,,, | Performed by: INTERNAL MEDICINE

## 2021-03-08 PROCEDURE — 1159F PR MEDICATION LIST DOCUMENTED IN MEDICAL RECORD: ICD-10-PCS | Mod: S$GLB,,, | Performed by: INTERNAL MEDICINE

## 2021-03-08 PROCEDURE — 99999 PR PBB SHADOW E&M-EST. PATIENT-LVL V: CPT | Mod: PBBFAC,,, | Performed by: INTERNAL MEDICINE

## 2021-03-08 PROCEDURE — 99214 OFFICE O/P EST MOD 30 MIN: CPT | Mod: S$GLB,,, | Performed by: INTERNAL MEDICINE

## 2021-03-08 PROCEDURE — 1157F ADVNC CARE PLAN IN RCRD: CPT | Mod: S$GLB,,, | Performed by: INTERNAL MEDICINE

## 2021-03-08 PROCEDURE — 3075F PR MOST RECENT SYSTOLIC BLOOD PRESS GE 130-139MM HG: ICD-10-PCS | Mod: CPTII,S$GLB,, | Performed by: INTERNAL MEDICINE

## 2021-03-08 PROCEDURE — 1126F PR PAIN SEVERITY QUANTIFIED, NO PAIN PRESENT: ICD-10-PCS | Mod: S$GLB,,, | Performed by: INTERNAL MEDICINE

## 2021-03-08 PROCEDURE — 99214 PR OFFICE/OUTPT VISIT, EST, LEVL IV, 30-39 MIN: ICD-10-PCS | Mod: S$GLB,,, | Performed by: INTERNAL MEDICINE

## 2021-03-08 PROCEDURE — 1159F MED LIST DOCD IN RCRD: CPT | Mod: S$GLB,,, | Performed by: INTERNAL MEDICINE

## 2021-03-08 PROCEDURE — 3008F BODY MASS INDEX DOCD: CPT | Mod: CPTII,S$GLB,, | Performed by: INTERNAL MEDICINE

## 2021-03-08 PROCEDURE — 1101F PR PT FALLS ASSESS DOC 0-1 FALLS W/OUT INJ PAST YR: ICD-10-PCS | Mod: CPTII,S$GLB,, | Performed by: INTERNAL MEDICINE

## 2021-03-08 PROCEDURE — 3288F FALL RISK ASSESSMENT DOCD: CPT | Mod: CPTII,S$GLB,, | Performed by: INTERNAL MEDICINE

## 2021-03-08 RX ORDER — GLIPIZIDE 5 MG/1
5 TABLET ORAL
Qty: 180 TABLET | Refills: 3 | Status: SHIPPED | OUTPATIENT
Start: 2021-03-08 | End: 2021-03-12

## 2021-03-08 RX ORDER — INSULIN DEGLUDEC 100 U/ML
38 INJECTION, SOLUTION SUBCUTANEOUS NIGHTLY
Qty: 30 ML | Refills: 3 | Status: SHIPPED | OUTPATIENT
Start: 2021-03-08 | End: 2021-04-06 | Stop reason: SDUPTHER

## 2021-03-17 PROCEDURE — 99454 PR REMOTE MNTR, PHYS PARAM, INITIAL, EA 30 DAYS: ICD-10-PCS | Mod: S$GLB,,, | Performed by: INTERNAL MEDICINE

## 2021-03-17 PROCEDURE — 99454 REM MNTR PHYSIOL PARAM 16-30: CPT | Mod: S$GLB,,, | Performed by: INTERNAL MEDICINE

## 2021-03-24 ENCOUNTER — IMMUNIZATION (OUTPATIENT)
Dept: PHARMACY | Facility: CLINIC | Age: 71
End: 2021-03-24
Payer: MEDICARE

## 2021-03-24 ENCOUNTER — PATIENT MESSAGE (OUTPATIENT)
Dept: UROLOGY | Facility: CLINIC | Age: 71
End: 2021-03-24

## 2021-03-24 DIAGNOSIS — Z23 NEED FOR VACCINATION: Primary | ICD-10-CM

## 2021-03-24 DIAGNOSIS — N95.2 VAGINAL ATROPHY: ICD-10-CM

## 2021-03-24 DIAGNOSIS — N39.0 RECURRENT UTI: ICD-10-CM

## 2021-03-25 ENCOUNTER — PES CALL (OUTPATIENT)
Dept: ADMINISTRATIVE | Facility: CLINIC | Age: 71
End: 2021-03-25

## 2021-03-30 NOTE — TELEPHONE ENCOUNTER
Scheduled sleep study on Sept 19th,   Cyclophosphamide Pregnancy And Lactation Text: This medication is Pregnancy Category D and it isn't considered safe during pregnancy. This medication is excreted in breast milk.

## 2021-03-31 ENCOUNTER — CLINICAL SUPPORT (OUTPATIENT)
Dept: INTERNAL MEDICINE | Facility: CLINIC | Age: 71
End: 2021-03-31
Payer: MEDICARE

## 2021-03-31 DIAGNOSIS — J30.9 CHRONIC ALLERGIC RHINITIS: ICD-10-CM

## 2021-03-31 PROCEDURE — 95115 PR IMMUNOTHERAPY, ONE INJECTION: ICD-10-PCS | Mod: HCNC,S$GLB,, | Performed by: FAMILY MEDICINE

## 2021-03-31 PROCEDURE — 95115 IMMUNOTHERAPY ONE INJECTION: CPT | Mod: HCNC,S$GLB,, | Performed by: FAMILY MEDICINE

## 2021-03-31 PROCEDURE — 99499 UNLISTED E&M SERVICE: CPT | Mod: HCNC,S$GLB,, | Performed by: ALLERGY & IMMUNOLOGY

## 2021-03-31 PROCEDURE — 99499 NO LOS: ICD-10-PCS | Mod: HCNC,S$GLB,, | Performed by: ALLERGY & IMMUNOLOGY

## 2021-04-01 ENCOUNTER — PATIENT MESSAGE (OUTPATIENT)
Dept: UROLOGY | Facility: CLINIC | Age: 71
End: 2021-04-01

## 2021-04-06 ENCOUNTER — PATIENT MESSAGE (OUTPATIENT)
Dept: ADMINISTRATIVE | Facility: OTHER | Age: 71
End: 2021-04-06

## 2021-04-08 ENCOUNTER — CLINICAL SUPPORT (OUTPATIENT)
Dept: FAMILY MEDICINE | Facility: CLINIC | Age: 71
End: 2021-04-08
Payer: MEDICARE

## 2021-04-08 DIAGNOSIS — Z23 IMMUNIZATION DUE: Primary | ICD-10-CM

## 2021-04-08 PROCEDURE — 99999 PR PBB SHADOW E&M-EST. PATIENT-LVL I: ICD-10-PCS | Mod: PBBFAC,HCNC,,

## 2021-04-08 PROCEDURE — G0010 ADMIN HEPATITIS B VACCINE: HCPCS | Mod: HCNC,S$GLB,, | Performed by: INTERNAL MEDICINE

## 2021-04-08 PROCEDURE — G0010 HEPATITIS B VACCINE ADULT IM: ICD-10-PCS | Mod: HCNC,S$GLB,, | Performed by: INTERNAL MEDICINE

## 2021-04-08 PROCEDURE — 90746 HEPATITIS B VACCINE ADULT IM: ICD-10-PCS | Mod: HCNC,S$GLB,, | Performed by: INTERNAL MEDICINE

## 2021-04-08 PROCEDURE — 99999 PR PBB SHADOW E&M-EST. PATIENT-LVL I: CPT | Mod: PBBFAC,HCNC,,

## 2021-04-08 PROCEDURE — 90746 HEPB VACCINE 3 DOSE ADULT IM: CPT | Mod: HCNC,S$GLB,, | Performed by: INTERNAL MEDICINE

## 2021-04-09 ENCOUNTER — OFFICE VISIT (OUTPATIENT)
Dept: UROLOGY | Facility: CLINIC | Age: 71
End: 2021-04-09
Payer: MEDICARE

## 2021-04-09 VITALS
HEART RATE: 72 BPM | HEIGHT: 62 IN | DIASTOLIC BLOOD PRESSURE: 52 MMHG | WEIGHT: 237.19 LBS | BODY MASS INDEX: 43.65 KG/M2 | SYSTOLIC BLOOD PRESSURE: 111 MMHG

## 2021-04-09 DIAGNOSIS — N39.0 RECURRENT UTI: Primary | ICD-10-CM

## 2021-04-09 DIAGNOSIS — N95.2 VAGINAL ATROPHY: ICD-10-CM

## 2021-04-09 PROCEDURE — 3288F PR FALLS RISK ASSESSMENT DOCUMENTED: ICD-10-PCS | Mod: HCNC,CPTII,S$GLB, | Performed by: UROLOGY

## 2021-04-09 PROCEDURE — 3288F FALL RISK ASSESSMENT DOCD: CPT | Mod: HCNC,CPTII,S$GLB, | Performed by: UROLOGY

## 2021-04-09 PROCEDURE — 1157F ADVNC CARE PLAN IN RCRD: CPT | Mod: HCNC,S$GLB,, | Performed by: UROLOGY

## 2021-04-09 PROCEDURE — 99999 PR PBB SHADOW E&M-EST. PATIENT-LVL II: ICD-10-PCS | Mod: PBBFAC,HCNC,, | Performed by: UROLOGY

## 2021-04-09 PROCEDURE — 3008F BODY MASS INDEX DOCD: CPT | Mod: HCNC,CPTII,S$GLB, | Performed by: UROLOGY

## 2021-04-09 PROCEDURE — 1159F PR MEDICATION LIST DOCUMENTED IN MEDICAL RECORD: ICD-10-PCS | Mod: HCNC,S$GLB,, | Performed by: UROLOGY

## 2021-04-09 PROCEDURE — 99999 PR PBB SHADOW E&M-EST. PATIENT-LVL II: CPT | Mod: PBBFAC,HCNC,, | Performed by: UROLOGY

## 2021-04-09 PROCEDURE — 1126F PR PAIN SEVERITY QUANTIFIED, NO PAIN PRESENT: ICD-10-PCS | Mod: HCNC,S$GLB,, | Performed by: UROLOGY

## 2021-04-09 PROCEDURE — 1126F AMNT PAIN NOTED NONE PRSNT: CPT | Mod: HCNC,S$GLB,, | Performed by: UROLOGY

## 2021-04-09 PROCEDURE — 1159F MED LIST DOCD IN RCRD: CPT | Mod: HCNC,S$GLB,, | Performed by: UROLOGY

## 2021-04-09 PROCEDURE — 99212 PR OFFICE/OUTPT VISIT, EST, LEVL II, 10-19 MIN: ICD-10-PCS | Mod: HCNC,S$GLB,, | Performed by: UROLOGY

## 2021-04-09 PROCEDURE — 99212 OFFICE O/P EST SF 10 MIN: CPT | Mod: HCNC,S$GLB,, | Performed by: UROLOGY

## 2021-04-09 PROCEDURE — 1101F PR PT FALLS ASSESS DOC 0-1 FALLS W/OUT INJ PAST YR: ICD-10-PCS | Mod: HCNC,CPTII,S$GLB, | Performed by: UROLOGY

## 2021-04-09 PROCEDURE — 3008F PR BODY MASS INDEX (BMI) DOCUMENTED: ICD-10-PCS | Mod: HCNC,CPTII,S$GLB, | Performed by: UROLOGY

## 2021-04-09 PROCEDURE — 1101F PT FALLS ASSESS-DOCD LE1/YR: CPT | Mod: HCNC,CPTII,S$GLB, | Performed by: UROLOGY

## 2021-04-09 PROCEDURE — 1157F PR ADVANCE CARE PLAN OR EQUIV PRESENT IN MEDICAL RECORD: ICD-10-PCS | Mod: HCNC,S$GLB,, | Performed by: UROLOGY

## 2021-04-17 PROCEDURE — 99454 REM MNTR PHYSIOL PARAM 16-30: CPT | Mod: S$GLB,,, | Performed by: INTERNAL MEDICINE

## 2021-04-17 PROCEDURE — 99454 PR REMOTE MNTR, PHYS PARAM, INITIAL, EA 30 DAYS: ICD-10-PCS | Mod: S$GLB,,, | Performed by: INTERNAL MEDICINE

## 2021-04-30 ENCOUNTER — CLINICAL SUPPORT (OUTPATIENT)
Dept: INTERNAL MEDICINE | Facility: CLINIC | Age: 71
End: 2021-04-30
Payer: MEDICARE

## 2021-04-30 DIAGNOSIS — J30.9 CHRONIC ALLERGIC RHINITIS: ICD-10-CM

## 2021-04-30 PROCEDURE — 95115 IMMUNOTHERAPY ONE INJECTION: CPT | Mod: HCNC,S$GLB,, | Performed by: FAMILY MEDICINE

## 2021-04-30 PROCEDURE — 99499 UNLISTED E&M SERVICE: CPT | Mod: HCNC,S$GLB,, | Performed by: ALLERGY & IMMUNOLOGY

## 2021-04-30 PROCEDURE — 99999 PR PBB SHADOW E&M-EST. PATIENT-LVL I: CPT | Mod: PBBFAC,HCNC,,

## 2021-04-30 PROCEDURE — 99499 NO LOS: ICD-10-PCS | Mod: HCNC,S$GLB,, | Performed by: ALLERGY & IMMUNOLOGY

## 2021-04-30 PROCEDURE — 95115 PR IMMUNOTHERAPY, ONE INJECTION: ICD-10-PCS | Mod: HCNC,S$GLB,, | Performed by: FAMILY MEDICINE

## 2021-04-30 PROCEDURE — 99999 PR PBB SHADOW E&M-EST. PATIENT-LVL I: ICD-10-PCS | Mod: PBBFAC,HCNC,,

## 2021-05-15 ENCOUNTER — PATIENT OUTREACH (OUTPATIENT)
Dept: ADMINISTRATIVE | Facility: OTHER | Age: 71
End: 2021-05-15

## 2021-05-17 ENCOUNTER — OFFICE VISIT (OUTPATIENT)
Dept: OPTOMETRY | Facility: CLINIC | Age: 71
End: 2021-05-17
Payer: MEDICARE

## 2021-05-17 DIAGNOSIS — H25.13 NUCLEAR SCLEROSIS, BILATERAL: ICD-10-CM

## 2021-05-17 DIAGNOSIS — H52.13 MYOPIA WITH ASTIGMATISM AND PRESBYOPIA, BILATERAL: ICD-10-CM

## 2021-05-17 DIAGNOSIS — E11.9 TYPE 2 DIABETES MELLITUS WITHOUT RETINOPATHY: Primary | ICD-10-CM

## 2021-05-17 DIAGNOSIS — H53.9 VISUAL DISTURBANCE: ICD-10-CM

## 2021-05-17 DIAGNOSIS — H52.4 MYOPIA WITH ASTIGMATISM AND PRESBYOPIA, BILATERAL: ICD-10-CM

## 2021-05-17 DIAGNOSIS — H52.203 MYOPIA WITH ASTIGMATISM AND PRESBYOPIA, BILATERAL: ICD-10-CM

## 2021-05-17 PROCEDURE — 1126F PR PAIN SEVERITY QUANTIFIED, NO PAIN PRESENT: ICD-10-PCS | Mod: HCNC,S$GLB,, | Performed by: OPTOMETRIST

## 2021-05-17 PROCEDURE — 99999 PR PBB SHADOW E&M-EST. PATIENT-LVL III: CPT | Mod: PBBFAC,HCNC,, | Performed by: OPTOMETRIST

## 2021-05-17 PROCEDURE — 2023F PR DILATED RETINAL EXAM W/O EVID OF RETINOPATHY: ICD-10-PCS | Mod: HCNC,S$GLB,, | Performed by: OPTOMETRIST

## 2021-05-17 PROCEDURE — 3288F FALL RISK ASSESSMENT DOCD: CPT | Mod: HCNC,CPTII,S$GLB, | Performed by: OPTOMETRIST

## 2021-05-17 PROCEDURE — 1157F ADVNC CARE PLAN IN RCRD: CPT | Mod: HCNC,S$GLB,, | Performed by: OPTOMETRIST

## 2021-05-17 PROCEDURE — 99454 REM MNTR PHYSIOL PARAM 16-30: CPT | Mod: S$GLB,,, | Performed by: INTERNAL MEDICINE

## 2021-05-17 PROCEDURE — 92014 PR EYE EXAM, EST PATIENT,COMPREHESV: ICD-10-PCS | Mod: HCNC,S$GLB,, | Performed by: OPTOMETRIST

## 2021-05-17 PROCEDURE — 1101F PT FALLS ASSESS-DOCD LE1/YR: CPT | Mod: HCNC,CPTII,S$GLB, | Performed by: OPTOMETRIST

## 2021-05-17 PROCEDURE — 3044F HG A1C LEVEL LT 7.0%: CPT | Mod: HCNC,CPTII,S$GLB, | Performed by: OPTOMETRIST

## 2021-05-17 PROCEDURE — 99999 PR PBB SHADOW E&M-EST. PATIENT-LVL III: ICD-10-PCS | Mod: PBBFAC,HCNC,, | Performed by: OPTOMETRIST

## 2021-05-17 PROCEDURE — 92014 COMPRE OPH EXAM EST PT 1/>: CPT | Mod: HCNC,S$GLB,, | Performed by: OPTOMETRIST

## 2021-05-17 PROCEDURE — 1157F PR ADVANCE CARE PLAN OR EQUIV PRESENT IN MEDICAL RECORD: ICD-10-PCS | Mod: HCNC,S$GLB,, | Performed by: OPTOMETRIST

## 2021-05-17 PROCEDURE — 3288F PR FALLS RISK ASSESSMENT DOCUMENTED: ICD-10-PCS | Mod: HCNC,CPTII,S$GLB, | Performed by: OPTOMETRIST

## 2021-05-17 PROCEDURE — 1101F PR PT FALLS ASSESS DOC 0-1 FALLS W/OUT INJ PAST YR: ICD-10-PCS | Mod: HCNC,CPTII,S$GLB, | Performed by: OPTOMETRIST

## 2021-05-17 PROCEDURE — 3044F PR MOST RECENT HEMOGLOBIN A1C LEVEL <7.0%: ICD-10-PCS | Mod: HCNC,CPTII,S$GLB, | Performed by: OPTOMETRIST

## 2021-05-17 PROCEDURE — 2023F DILAT RTA XM W/O RTNOPTHY: CPT | Mod: HCNC,S$GLB,, | Performed by: OPTOMETRIST

## 2021-05-17 PROCEDURE — 99454 PR REMOTE MNTR, PHYS PARAM, INITIAL, EA 30 DAYS: ICD-10-PCS | Mod: S$GLB,,, | Performed by: INTERNAL MEDICINE

## 2021-05-17 PROCEDURE — 1126F AMNT PAIN NOTED NONE PRSNT: CPT | Mod: HCNC,S$GLB,, | Performed by: OPTOMETRIST

## 2021-05-17 RX ORDER — HEPATITIS B VACCINE (RECOMBINANT) 20 UG/ML
INJECTION, SUSPENSION INTRAMUSCULAR
COMMUNITY
Start: 2021-04-08 | End: 2022-01-14

## 2021-05-19 ENCOUNTER — TELEPHONE (OUTPATIENT)
Dept: ORTHOPEDICS | Facility: CLINIC | Age: 71
End: 2021-05-19

## 2021-05-19 DIAGNOSIS — M25.559 HIP PAIN: Primary | ICD-10-CM

## 2021-05-20 ENCOUNTER — HOSPITAL ENCOUNTER (OUTPATIENT)
Dept: RADIOLOGY | Facility: HOSPITAL | Age: 71
Discharge: HOME | End: 2021-05-20
Attending: ORTHOPAEDIC SURGERY
Payer: MEDICARE

## 2021-05-20 ENCOUNTER — HOSPITAL ENCOUNTER (OUTPATIENT)
Dept: RADIOLOGY | Facility: HOSPITAL | Age: 71
Discharge: HOME | End: 2021-05-20
Attending: INTERNAL MEDICINE
Payer: MEDICARE

## 2021-05-20 ENCOUNTER — PATIENT MESSAGE (OUTPATIENT)
Dept: UROLOGY | Facility: CLINIC | Age: 71
End: 2021-05-20

## 2021-05-20 ENCOUNTER — TELEPHONE (OUTPATIENT)
Dept: ALLERGY | Facility: CLINIC | Age: 71
End: 2021-05-20

## 2021-05-20 ENCOUNTER — OFFICE VISIT (OUTPATIENT)
Dept: ORTHOPEDICS | Facility: CLINIC | Age: 71
End: 2021-05-20
Payer: MEDICARE

## 2021-05-20 ENCOUNTER — HOSPITAL ENCOUNTER (OUTPATIENT)
Dept: RADIOLOGY | Facility: HOSPITAL | Age: 71
Discharge: HOME | End: 2021-05-20
Attending: STUDENT IN AN ORGANIZED HEALTH CARE EDUCATION/TRAINING PROGRAM
Payer: MEDICARE

## 2021-05-20 VITALS
SYSTOLIC BLOOD PRESSURE: 137 MMHG | DIASTOLIC BLOOD PRESSURE: 66 MMHG | WEIGHT: 237.19 LBS | BODY MASS INDEX: 43.65 KG/M2 | HEIGHT: 62 IN | HEART RATE: 73 BPM

## 2021-05-20 DIAGNOSIS — R31.9 URINARY TRACT INFECTION WITH HEMATURIA, SITE UNSPECIFIED: ICD-10-CM

## 2021-05-20 DIAGNOSIS — M48.061 SPINAL STENOSIS OF LUMBAR REGION WITHOUT NEUROGENIC CLAUDICATION: Primary | ICD-10-CM

## 2021-05-20 DIAGNOSIS — K74.60 HEPATIC CIRRHOSIS, UNSPECIFIED HEPATIC CIRRHOSIS TYPE, UNSPECIFIED WHETHER ASCITES PRESENT: ICD-10-CM

## 2021-05-20 DIAGNOSIS — M25.559 HIP PAIN: ICD-10-CM

## 2021-05-20 DIAGNOSIS — N39.0 RECURRENT UTI: ICD-10-CM

## 2021-05-20 DIAGNOSIS — N39.0 URINARY TRACT INFECTION WITH HEMATURIA, SITE UNSPECIFIED: ICD-10-CM

## 2021-05-20 PROCEDURE — 76700 US ABDOMEN COMPLETE: ICD-10-PCS | Mod: 26,,, | Performed by: RADIOLOGY

## 2021-05-20 PROCEDURE — 1157F PR ADVANCE CARE PLAN OR EQUIV PRESENT IN MEDICAL RECORD: ICD-10-PCS | Mod: S$GLB,,, | Performed by: ORTHOPAEDIC SURGERY

## 2021-05-20 PROCEDURE — 1125F AMNT PAIN NOTED PAIN PRSNT: CPT | Mod: S$GLB,,, | Performed by: ORTHOPAEDIC SURGERY

## 2021-05-20 PROCEDURE — 1159F MED LIST DOCD IN RCRD: CPT | Mod: S$GLB,,, | Performed by: ORTHOPAEDIC SURGERY

## 2021-05-20 PROCEDURE — 3008F PR BODY MASS INDEX (BMI) DOCUMENTED: ICD-10-PCS | Mod: CPTII,S$GLB,, | Performed by: ORTHOPAEDIC SURGERY

## 2021-05-20 PROCEDURE — 76770 US EXAM ABDO BACK WALL COMP: CPT | Mod: 26,,, | Performed by: RADIOLOGY

## 2021-05-20 PROCEDURE — 99999 PR PBB SHADOW E&M-EST. PATIENT-LVL V: CPT | Mod: PBBFAC,,, | Performed by: ORTHOPAEDIC SURGERY

## 2021-05-20 PROCEDURE — 73521 XR HIPS BILATERAL 2 VIEW INCL AP PELVIS: ICD-10-PCS | Mod: 26,,, | Performed by: RADIOLOGY

## 2021-05-20 PROCEDURE — 73521 X-RAY EXAM HIPS BI 2 VIEWS: CPT | Mod: 26,,, | Performed by: RADIOLOGY

## 2021-05-20 PROCEDURE — 1157F ADVNC CARE PLAN IN RCRD: CPT | Mod: S$GLB,,, | Performed by: ORTHOPAEDIC SURGERY

## 2021-05-20 PROCEDURE — 76770 US EXAM ABDO BACK WALL COMP: CPT | Mod: TC

## 2021-05-20 PROCEDURE — 76770 US RETROPERITONEAL COMPLETE: ICD-10-PCS | Mod: 26,,, | Performed by: RADIOLOGY

## 2021-05-20 PROCEDURE — 76700 US EXAM ABDOM COMPLETE: CPT | Mod: 26,,, | Performed by: RADIOLOGY

## 2021-05-20 PROCEDURE — 1159F PR MEDICATION LIST DOCUMENTED IN MEDICAL RECORD: ICD-10-PCS | Mod: S$GLB,,, | Performed by: ORTHOPAEDIC SURGERY

## 2021-05-20 PROCEDURE — 99999 PR PBB SHADOW E&M-EST. PATIENT-LVL V: ICD-10-PCS | Mod: PBBFAC,,, | Performed by: ORTHOPAEDIC SURGERY

## 2021-05-20 PROCEDURE — 99213 PR OFFICE/OUTPT VISIT, EST, LEVL III, 20-29 MIN: ICD-10-PCS | Mod: S$GLB,,, | Performed by: ORTHOPAEDIC SURGERY

## 2021-05-20 PROCEDURE — 76700 US EXAM ABDOM COMPLETE: CPT | Mod: TC

## 2021-05-20 PROCEDURE — 73521 X-RAY EXAM HIPS BI 2 VIEWS: CPT | Mod: TC,PN

## 2021-05-20 PROCEDURE — 99213 OFFICE O/P EST LOW 20 MIN: CPT | Mod: S$GLB,,, | Performed by: ORTHOPAEDIC SURGERY

## 2021-05-20 PROCEDURE — 1125F PR PAIN SEVERITY QUANTIFIED, PAIN PRESENT: ICD-10-PCS | Mod: S$GLB,,, | Performed by: ORTHOPAEDIC SURGERY

## 2021-05-20 PROCEDURE — 3008F BODY MASS INDEX DOCD: CPT | Mod: CPTII,S$GLB,, | Performed by: ORTHOPAEDIC SURGERY

## 2021-05-21 ENCOUNTER — CLINICAL SUPPORT (OUTPATIENT)
Dept: INTERNAL MEDICINE | Facility: CLINIC | Age: 71
End: 2021-05-21
Payer: MEDICARE

## 2021-05-21 DIAGNOSIS — J30.9 CHRONIC ALLERGIC RHINITIS: ICD-10-CM

## 2021-05-21 PROCEDURE — 95115 IMMUNOTHERAPY ONE INJECTION: CPT | Mod: S$GLB,,, | Performed by: FAMILY MEDICINE

## 2021-05-21 PROCEDURE — 99499 UNLISTED E&M SERVICE: CPT | Mod: S$GLB,,, | Performed by: ALLERGY & IMMUNOLOGY

## 2021-05-21 PROCEDURE — 95115 PR IMMUNOTHERAPY, ONE INJECTION: ICD-10-PCS | Mod: S$GLB,,, | Performed by: FAMILY MEDICINE

## 2021-05-21 PROCEDURE — 99499 NO LOS: ICD-10-PCS | Mod: S$GLB,,, | Performed by: ALLERGY & IMMUNOLOGY

## 2021-05-28 ENCOUNTER — PES CALL (OUTPATIENT)
Dept: ADMINISTRATIVE | Facility: CLINIC | Age: 71
End: 2021-05-28

## 2021-06-17 PROCEDURE — 99454 REM MNTR PHYSIOL PARAM 16-30: CPT | Mod: S$GLB,,, | Performed by: INTERNAL MEDICINE

## 2021-06-17 PROCEDURE — 99454 PR REMOTE MNTR, PHYS PARAM, INITIAL, EA 30 DAYS: ICD-10-PCS | Mod: S$GLB,,, | Performed by: INTERNAL MEDICINE

## 2021-06-18 ENCOUNTER — CLINICAL SUPPORT (OUTPATIENT)
Dept: INTERNAL MEDICINE | Facility: CLINIC | Age: 71
End: 2021-06-18
Payer: MEDICARE

## 2021-06-18 ENCOUNTER — CLINICAL SUPPORT (OUTPATIENT)
Dept: REHABILITATION | Facility: HOSPITAL | Age: 71
End: 2021-06-18
Attending: ORTHOPAEDIC SURGERY
Payer: MEDICARE

## 2021-06-18 DIAGNOSIS — R29.898 IMPAIRED FLEXIBILITY OF LOWER EXTREMITY: ICD-10-CM

## 2021-06-18 DIAGNOSIS — J30.9 CHRONIC ALLERGIC RHINITIS: ICD-10-CM

## 2021-06-18 DIAGNOSIS — R26.9 GAIT ABNORMALITY: Primary | ICD-10-CM

## 2021-06-18 DIAGNOSIS — M25.559 HIP PAIN: ICD-10-CM

## 2021-06-18 DIAGNOSIS — M48.061 SPINAL STENOSIS OF LUMBAR REGION WITHOUT NEUROGENIC CLAUDICATION: ICD-10-CM

## 2021-06-18 PROCEDURE — 95115 IMMUNOTHERAPY ONE INJECTION: CPT | Mod: HCNC,S$GLB,, | Performed by: FAMILY MEDICINE

## 2021-06-18 PROCEDURE — 99499 UNLISTED E&M SERVICE: CPT | Mod: HCNC,S$GLB,, | Performed by: ALLERGY & IMMUNOLOGY

## 2021-06-18 PROCEDURE — 95115 PR IMMUNOTHERAPY, ONE INJECTION: ICD-10-PCS | Mod: HCNC,S$GLB,, | Performed by: FAMILY MEDICINE

## 2021-06-18 PROCEDURE — 99499 NO LOS: ICD-10-PCS | Mod: HCNC,S$GLB,, | Performed by: ALLERGY & IMMUNOLOGY

## 2021-06-18 PROCEDURE — 97162 PT EVAL MOD COMPLEX 30 MIN: CPT | Mod: HCNC,PN | Performed by: PHYSICAL THERAPIST

## 2021-06-20 DIAGNOSIS — I15.2 HYPERTENSION ASSOCIATED WITH DIABETES: ICD-10-CM

## 2021-06-20 DIAGNOSIS — E87.6 HYPOKALEMIA: ICD-10-CM

## 2021-06-20 DIAGNOSIS — E11.59 HYPERTENSION ASSOCIATED WITH DIABETES: ICD-10-CM

## 2021-06-21 RX ORDER — POTASSIUM CHLORIDE 750 MG/1
TABLET, EXTENDED RELEASE ORAL
Qty: 180 TABLET | Refills: 3 | Status: SHIPPED | OUTPATIENT
Start: 2021-06-21 | End: 2022-02-11 | Stop reason: SDUPTHER

## 2021-06-21 RX ORDER — CHLORTHALIDONE 25 MG/1
TABLET ORAL
Qty: 180 TABLET | Refills: 3 | Status: SHIPPED | OUTPATIENT
Start: 2021-06-21 | End: 2022-08-11

## 2021-06-22 ENCOUNTER — DOCUMENTATION ONLY (OUTPATIENT)
Dept: REHABILITATION | Facility: HOSPITAL | Age: 71
End: 2021-06-22

## 2021-06-25 ENCOUNTER — OFFICE VISIT (OUTPATIENT)
Dept: FAMILY MEDICINE | Facility: CLINIC | Age: 71
End: 2021-06-25
Payer: MEDICARE

## 2021-06-25 VITALS
OXYGEN SATURATION: 96 % | RESPIRATION RATE: 20 BRPM | SYSTOLIC BLOOD PRESSURE: 130 MMHG | WEIGHT: 240.94 LBS | DIASTOLIC BLOOD PRESSURE: 60 MMHG | HEART RATE: 69 BPM | HEIGHT: 62 IN | BODY MASS INDEX: 44.34 KG/M2

## 2021-06-25 DIAGNOSIS — E03.9 HYPOTHYROIDISM, UNSPECIFIED TYPE: ICD-10-CM

## 2021-06-25 DIAGNOSIS — Z79.4 TYPE 2 DIABETES MELLITUS WITH HYPERGLYCEMIA, WITH LONG-TERM CURRENT USE OF INSULIN: ICD-10-CM

## 2021-06-25 DIAGNOSIS — M46.1 SACROILIITIS: ICD-10-CM

## 2021-06-25 DIAGNOSIS — K21.9 GASTROESOPHAGEAL REFLUX DISEASE, UNSPECIFIED WHETHER ESOPHAGITIS PRESENT: ICD-10-CM

## 2021-06-25 DIAGNOSIS — E11.42 DIABETIC POLYNEUROPATHY ASSOCIATED WITH TYPE 2 DIABETES MELLITUS: ICD-10-CM

## 2021-06-25 DIAGNOSIS — M85.80 OSTEOPENIA, UNSPECIFIED LOCATION: ICD-10-CM

## 2021-06-25 DIAGNOSIS — I77.9 BILATERAL CAROTID ARTERY DISEASE, UNSPECIFIED TYPE: ICD-10-CM

## 2021-06-25 DIAGNOSIS — Z00.00 ENCOUNTER FOR PREVENTIVE HEALTH EXAMINATION: Primary | ICD-10-CM

## 2021-06-25 DIAGNOSIS — K74.60 HEPATIC CIRRHOSIS, UNSPECIFIED HEPATIC CIRRHOSIS TYPE, UNSPECIFIED WHETHER ASCITES PRESENT: ICD-10-CM

## 2021-06-25 DIAGNOSIS — E66.01 MORBID OBESITY WITH BODY MASS INDEX (BMI) OF 40.0 TO 44.9 IN ADULT: ICD-10-CM

## 2021-06-25 DIAGNOSIS — E55.9 VITAMIN D DEFICIENCY: ICD-10-CM

## 2021-06-25 DIAGNOSIS — E78.5 HYPERLIPIDEMIA, UNSPECIFIED HYPERLIPIDEMIA TYPE: ICD-10-CM

## 2021-06-25 DIAGNOSIS — E11.65 TYPE 2 DIABETES MELLITUS WITH HYPERGLYCEMIA, WITH LONG-TERM CURRENT USE OF INSULIN: ICD-10-CM

## 2021-06-25 DIAGNOSIS — J30.9 CHRONIC ALLERGIC RHINITIS: ICD-10-CM

## 2021-06-25 DIAGNOSIS — I25.10 CORONARY ARTERY DISEASE, ANGINA PRESENCE UNSPECIFIED, UNSPECIFIED VESSEL OR LESION TYPE, UNSPECIFIED WHETHER NATIVE OR TRANSPLANTED HEART: ICD-10-CM

## 2021-06-25 DIAGNOSIS — E11.3293 MILD NONPROLIFERATIVE DIABETIC RETINOPATHY OF BOTH EYES WITHOUT MACULAR EDEMA ASSOCIATED WITH TYPE 2 DIABETES MELLITUS: ICD-10-CM

## 2021-06-25 DIAGNOSIS — I10 ESSENTIAL HYPERTENSION: ICD-10-CM

## 2021-06-25 DIAGNOSIS — G47.33 OSA (OBSTRUCTIVE SLEEP APNEA): ICD-10-CM

## 2021-06-25 PROBLEM — N39.0 RECURRENT UTI: Status: RESOLVED | Noted: 2017-03-02 | Resolved: 2021-06-25

## 2021-06-25 PROBLEM — R31.9 HEMATURIA: Status: RESOLVED | Noted: 2018-11-16 | Resolved: 2021-06-25

## 2021-06-25 PROCEDURE — 99999 PR PBB SHADOW E&M-EST. PATIENT-LVL V: CPT | Mod: PBBFAC,HCNC,, | Performed by: NURSE PRACTITIONER

## 2021-06-25 PROCEDURE — 3288F FALL RISK ASSESSMENT DOCD: CPT | Mod: HCNC,CPTII,S$GLB, | Performed by: NURSE PRACTITIONER

## 2021-06-25 PROCEDURE — 3008F BODY MASS INDEX DOCD: CPT | Mod: HCNC,CPTII,S$GLB, | Performed by: NURSE PRACTITIONER

## 2021-06-25 PROCEDURE — 99499 RISK ADDL DX/OHS AUDIT: ICD-10-PCS | Mod: HCNC,S$GLB,, | Performed by: NURSE PRACTITIONER

## 2021-06-25 PROCEDURE — 1123F PR ADV CARE PLAN DISCUSSED, PLAN OR SURROGATE DOCUMENTED: ICD-10-PCS | Mod: HCNC,S$GLB,, | Performed by: NURSE PRACTITIONER

## 2021-06-25 PROCEDURE — 1101F PR PT FALLS ASSESS DOC 0-1 FALLS W/OUT INJ PAST YR: ICD-10-PCS | Mod: HCNC,CPTII,S$GLB, | Performed by: NURSE PRACTITIONER

## 2021-06-25 PROCEDURE — G0439 PR MEDICARE ANNUAL WELLNESS SUBSEQUENT VISIT: ICD-10-PCS | Mod: HCNC,S$GLB,, | Performed by: NURSE PRACTITIONER

## 2021-06-25 PROCEDURE — 3288F PR FALLS RISK ASSESSMENT DOCUMENTED: ICD-10-PCS | Mod: HCNC,CPTII,S$GLB, | Performed by: NURSE PRACTITIONER

## 2021-06-25 PROCEDURE — 3008F PR BODY MASS INDEX (BMI) DOCUMENTED: ICD-10-PCS | Mod: HCNC,CPTII,S$GLB, | Performed by: NURSE PRACTITIONER

## 2021-06-25 PROCEDURE — 1123F ACP DISCUSS/DSCN MKR DOCD: CPT | Mod: HCNC,S$GLB,, | Performed by: NURSE PRACTITIONER

## 2021-06-25 PROCEDURE — 99499 UNLISTED E&M SERVICE: CPT | Mod: HCNC,S$GLB,, | Performed by: NURSE PRACTITIONER

## 2021-06-25 PROCEDURE — G0439 PPPS, SUBSEQ VISIT: HCPCS | Mod: HCNC,S$GLB,, | Performed by: NURSE PRACTITIONER

## 2021-06-25 PROCEDURE — 1125F AMNT PAIN NOTED PAIN PRSNT: CPT | Mod: HCNC,S$GLB,, | Performed by: NURSE PRACTITIONER

## 2021-06-25 PROCEDURE — 1125F PR PAIN SEVERITY QUANTIFIED, PAIN PRESENT: ICD-10-PCS | Mod: HCNC,S$GLB,, | Performed by: NURSE PRACTITIONER

## 2021-06-25 PROCEDURE — 99999 PR PBB SHADOW E&M-EST. PATIENT-LVL V: ICD-10-PCS | Mod: PBBFAC,HCNC,, | Performed by: NURSE PRACTITIONER

## 2021-06-25 PROCEDURE — 1101F PT FALLS ASSESS-DOCD LE1/YR: CPT | Mod: HCNC,CPTII,S$GLB, | Performed by: NURSE PRACTITIONER

## 2021-06-28 ENCOUNTER — CLINICAL SUPPORT (OUTPATIENT)
Dept: REHABILITATION | Facility: HOSPITAL | Age: 71
End: 2021-06-28
Payer: MEDICARE

## 2021-06-28 DIAGNOSIS — R29.898 IMPAIRED FLEXIBILITY OF LOWER EXTREMITY: ICD-10-CM

## 2021-06-28 DIAGNOSIS — R26.9 GAIT ABNORMALITY: ICD-10-CM

## 2021-06-28 PROCEDURE — 97110 THERAPEUTIC EXERCISES: CPT | Mod: HCNC,PN,CQ

## 2021-07-02 ENCOUNTER — CLINICAL SUPPORT (OUTPATIENT)
Dept: REHABILITATION | Facility: HOSPITAL | Age: 71
End: 2021-07-02
Payer: MEDICARE

## 2021-07-02 DIAGNOSIS — R29.898 IMPAIRED FLEXIBILITY OF LOWER EXTREMITY: ICD-10-CM

## 2021-07-02 DIAGNOSIS — R26.9 GAIT ABNORMALITY: ICD-10-CM

## 2021-07-02 PROCEDURE — 97110 THERAPEUTIC EXERCISES: CPT | Mod: HCNC,PN,CQ

## 2021-07-07 ENCOUNTER — OFFICE VISIT (OUTPATIENT)
Dept: SLEEP MEDICINE | Facility: CLINIC | Age: 71
End: 2021-07-07
Payer: MEDICARE

## 2021-07-07 DIAGNOSIS — G47.33 OSA (OBSTRUCTIVE SLEEP APNEA): Primary | ICD-10-CM

## 2021-07-07 PROCEDURE — 99214 PR OFFICE/OUTPT VISIT, EST, LEVL IV, 30-39 MIN: ICD-10-PCS | Mod: HCNC,95,, | Performed by: PSYCHIATRY & NEUROLOGY

## 2021-07-07 PROCEDURE — 99499 UNLISTED E&M SERVICE: CPT | Mod: HCNC,95,, | Performed by: PSYCHIATRY & NEUROLOGY

## 2021-07-07 PROCEDURE — 1157F PR ADVANCE CARE PLAN OR EQUIV PRESENT IN MEDICAL RECORD: ICD-10-PCS | Mod: HCNC,95,, | Performed by: PSYCHIATRY & NEUROLOGY

## 2021-07-07 PROCEDURE — 1159F PR MEDICATION LIST DOCUMENTED IN MEDICAL RECORD: ICD-10-PCS | Mod: HCNC,95,, | Performed by: PSYCHIATRY & NEUROLOGY

## 2021-07-07 PROCEDURE — 99499 RISK ADDL DX/OHS AUDIT: ICD-10-PCS | Mod: HCNC,95,, | Performed by: PSYCHIATRY & NEUROLOGY

## 2021-07-07 PROCEDURE — 99214 OFFICE O/P EST MOD 30 MIN: CPT | Mod: HCNC,95,, | Performed by: PSYCHIATRY & NEUROLOGY

## 2021-07-07 PROCEDURE — 1159F MED LIST DOCD IN RCRD: CPT | Mod: HCNC,95,, | Performed by: PSYCHIATRY & NEUROLOGY

## 2021-07-07 PROCEDURE — 1157F ADVNC CARE PLAN IN RCRD: CPT | Mod: HCNC,95,, | Performed by: PSYCHIATRY & NEUROLOGY

## 2021-07-09 ENCOUNTER — CLINICAL SUPPORT (OUTPATIENT)
Dept: REHABILITATION | Facility: HOSPITAL | Age: 71
End: 2021-07-09
Payer: MEDICARE

## 2021-07-09 ENCOUNTER — PATIENT MESSAGE (OUTPATIENT)
Dept: HEPATOLOGY | Facility: CLINIC | Age: 71
End: 2021-07-09

## 2021-07-09 DIAGNOSIS — R29.898 IMPAIRED FLEXIBILITY OF LOWER EXTREMITY: ICD-10-CM

## 2021-07-09 DIAGNOSIS — R26.9 GAIT ABNORMALITY: ICD-10-CM

## 2021-07-09 PROCEDURE — 97110 THERAPEUTIC EXERCISES: CPT | Mod: HCNC,PN | Performed by: PHYSICAL THERAPIST

## 2021-07-12 ENCOUNTER — CLINICAL SUPPORT (OUTPATIENT)
Dept: REHABILITATION | Facility: HOSPITAL | Age: 71
End: 2021-07-12
Payer: MEDICARE

## 2021-07-12 DIAGNOSIS — R26.9 GAIT ABNORMALITY: ICD-10-CM

## 2021-07-12 DIAGNOSIS — R29.898 IMPAIRED FLEXIBILITY OF LOWER EXTREMITY: ICD-10-CM

## 2021-07-12 PROCEDURE — 97110 THERAPEUTIC EXERCISES: CPT | Mod: HCNC,PN | Performed by: PHYSICAL THERAPIST

## 2021-07-14 ENCOUNTER — CLINICAL SUPPORT (OUTPATIENT)
Dept: REHABILITATION | Facility: HOSPITAL | Age: 71
End: 2021-07-14
Payer: MEDICARE

## 2021-07-14 DIAGNOSIS — R29.898 IMPAIRED FLEXIBILITY OF LOWER EXTREMITY: ICD-10-CM

## 2021-07-14 DIAGNOSIS — R26.9 GAIT ABNORMALITY: ICD-10-CM

## 2021-07-14 PROCEDURE — 97110 THERAPEUTIC EXERCISES: CPT | Mod: HCNC,PN

## 2021-07-16 ENCOUNTER — CLINICAL SUPPORT (OUTPATIENT)
Dept: INTERNAL MEDICINE | Facility: CLINIC | Age: 71
End: 2021-07-16
Payer: MEDICARE

## 2021-07-16 DIAGNOSIS — J30.9 CHRONIC ALLERGIC RHINITIS: ICD-10-CM

## 2021-07-16 PROCEDURE — 99499 NO LOS: ICD-10-PCS | Mod: HCNC,S$GLB,, | Performed by: ALLERGY & IMMUNOLOGY

## 2021-07-16 PROCEDURE — 95115 PR IMMUNOTHERAPY, ONE INJECTION: ICD-10-PCS | Mod: HCNC,S$GLB,, | Performed by: FAMILY MEDICINE

## 2021-07-16 PROCEDURE — 95115 IMMUNOTHERAPY ONE INJECTION: CPT | Mod: HCNC,S$GLB,, | Performed by: FAMILY MEDICINE

## 2021-07-16 PROCEDURE — 99499 UNLISTED E&M SERVICE: CPT | Mod: HCNC,S$GLB,, | Performed by: ALLERGY & IMMUNOLOGY

## 2021-07-17 PROCEDURE — 99454 PR REMOTE MNTR, PHYS PARAM, INITIAL, EA 30 DAYS: ICD-10-PCS | Mod: S$GLB,,, | Performed by: INTERNAL MEDICINE

## 2021-07-17 PROCEDURE — 99454 REM MNTR PHYSIOL PARAM 16-30: CPT | Mod: S$GLB,,, | Performed by: INTERNAL MEDICINE

## 2021-07-19 ENCOUNTER — CLINICAL SUPPORT (OUTPATIENT)
Dept: REHABILITATION | Facility: HOSPITAL | Age: 71
End: 2021-07-19
Payer: MEDICARE

## 2021-07-19 DIAGNOSIS — R26.9 GAIT ABNORMALITY: ICD-10-CM

## 2021-07-19 DIAGNOSIS — R29.898 IMPAIRED FLEXIBILITY OF LOWER EXTREMITY: ICD-10-CM

## 2021-07-19 PROBLEM — G89.29 CHRONIC LEFT-SIDED LOW BACK PAIN WITH LEFT-SIDED SCIATICA: Status: RESOLVED | Noted: 2018-10-22 | Resolved: 2021-07-19

## 2021-07-19 PROBLEM — M54.42 CHRONIC LEFT-SIDED LOW BACK PAIN WITH LEFT-SIDED SCIATICA: Status: RESOLVED | Noted: 2018-10-22 | Resolved: 2021-07-19

## 2021-07-19 PROCEDURE — 97110 THERAPEUTIC EXERCISES: CPT | Mod: HCNC,PN

## 2021-07-21 ENCOUNTER — CLINICAL SUPPORT (OUTPATIENT)
Dept: REHABILITATION | Facility: HOSPITAL | Age: 71
End: 2021-07-21
Payer: MEDICARE

## 2021-07-21 DIAGNOSIS — R29.898 IMPAIRED FLEXIBILITY OF LOWER EXTREMITY: ICD-10-CM

## 2021-07-21 DIAGNOSIS — R26.9 GAIT ABNORMALITY: ICD-10-CM

## 2021-07-21 PROCEDURE — 97110 THERAPEUTIC EXERCISES: CPT | Mod: HCNC,PN | Performed by: PHYSICAL THERAPIST

## 2021-07-21 PROCEDURE — 97530 THERAPEUTIC ACTIVITIES: CPT | Mod: HCNC,PN | Performed by: PHYSICAL THERAPIST

## 2021-07-23 ENCOUNTER — LAB VISIT (OUTPATIENT)
Dept: LAB | Facility: HOSPITAL | Age: 71
End: 2021-07-23
Attending: INTERNAL MEDICINE
Payer: MEDICARE

## 2021-07-23 DIAGNOSIS — E11.22 TYPE 2 DIABETES MELLITUS WITH STAGE 3A CHRONIC KIDNEY DISEASE, WITH LONG-TERM CURRENT USE OF INSULIN: ICD-10-CM

## 2021-07-23 DIAGNOSIS — N18.31 TYPE 2 DIABETES MELLITUS WITH STAGE 3A CHRONIC KIDNEY DISEASE, WITH LONG-TERM CURRENT USE OF INSULIN: ICD-10-CM

## 2021-07-23 DIAGNOSIS — Z79.4 TYPE 2 DIABETES MELLITUS WITH STAGE 3A CHRONIC KIDNEY DISEASE, WITH LONG-TERM CURRENT USE OF INSULIN: ICD-10-CM

## 2021-07-23 LAB
ALBUMIN/CREAT UR: 4.7 UG/MG (ref 0–30)
CREAT UR-MCNC: 127 MG/DL (ref 15–325)
MICROALBUMIN UR DL<=1MG/L-MCNC: 6 UG/ML

## 2021-07-23 PROCEDURE — 82570 ASSAY OF URINE CREATININE: CPT | Mod: HCNC | Performed by: INTERNAL MEDICINE

## 2021-07-23 PROCEDURE — 82043 UR ALBUMIN QUANTITATIVE: CPT | Mod: HCNC | Performed by: INTERNAL MEDICINE

## 2021-07-26 ENCOUNTER — CLINICAL SUPPORT (OUTPATIENT)
Dept: REHABILITATION | Facility: HOSPITAL | Age: 71
End: 2021-07-26
Payer: MEDICARE

## 2021-07-26 DIAGNOSIS — R29.898 IMPAIRED FLEXIBILITY OF LOWER EXTREMITY: ICD-10-CM

## 2021-07-26 DIAGNOSIS — R26.9 GAIT ABNORMALITY: ICD-10-CM

## 2021-07-26 PROCEDURE — 97530 THERAPEUTIC ACTIVITIES: CPT | Mod: HCNC,PN | Performed by: PHYSICAL THERAPIST

## 2021-07-27 ENCOUNTER — DOCUMENTATION ONLY (OUTPATIENT)
Dept: REHABILITATION | Facility: HOSPITAL | Age: 71
End: 2021-07-27

## 2021-07-28 ENCOUNTER — CLINICAL SUPPORT (OUTPATIENT)
Dept: REHABILITATION | Facility: HOSPITAL | Age: 71
End: 2021-07-28
Payer: MEDICARE

## 2021-07-28 DIAGNOSIS — R29.898 IMPAIRED FLEXIBILITY OF LOWER EXTREMITY: ICD-10-CM

## 2021-07-28 DIAGNOSIS — R26.9 GAIT ABNORMALITY: ICD-10-CM

## 2021-07-28 PROCEDURE — 97110 THERAPEUTIC EXERCISES: CPT | Mod: HCNC,PN

## 2021-07-31 PROCEDURE — 99457 PR MONITORING, PHYSIOL PARAM, REMOTE, 1ST 20 MINS, PER MONTH: ICD-10-PCS | Mod: S$GLB,,, | Performed by: INTERNAL MEDICINE

## 2021-07-31 PROCEDURE — 99457 RPM TX MGMT 1ST 20 MIN: CPT | Mod: S$GLB,,, | Performed by: INTERNAL MEDICINE

## 2021-08-02 ENCOUNTER — CLINICAL SUPPORT (OUTPATIENT)
Dept: REHABILITATION | Facility: HOSPITAL | Age: 71
End: 2021-08-02
Payer: MEDICARE

## 2021-08-02 DIAGNOSIS — R26.9 GAIT ABNORMALITY: ICD-10-CM

## 2021-08-02 DIAGNOSIS — R29.898 IMPAIRED FLEXIBILITY OF LOWER EXTREMITY: ICD-10-CM

## 2021-08-02 PROCEDURE — 97110 THERAPEUTIC EXERCISES: CPT | Mod: HCNC,PN | Performed by: PHYSICAL THERAPIST

## 2021-08-04 ENCOUNTER — CLINICAL SUPPORT (OUTPATIENT)
Dept: REHABILITATION | Facility: HOSPITAL | Age: 71
End: 2021-08-04
Payer: MEDICARE

## 2021-08-04 DIAGNOSIS — R26.9 GAIT ABNORMALITY: ICD-10-CM

## 2021-08-04 DIAGNOSIS — R29.898 IMPAIRED FLEXIBILITY OF LOWER EXTREMITY: ICD-10-CM

## 2021-08-04 PROCEDURE — 97110 THERAPEUTIC EXERCISES: CPT | Mod: HCNC,PN,CQ

## 2021-08-06 ENCOUNTER — TELEPHONE (OUTPATIENT)
Dept: ENDOCRINOLOGY | Facility: CLINIC | Age: 71
End: 2021-08-06

## 2021-08-09 ENCOUNTER — OFFICE VISIT (OUTPATIENT)
Dept: INTERNAL MEDICINE | Facility: CLINIC | Age: 71
End: 2021-08-09
Payer: MEDICARE

## 2021-08-09 ENCOUNTER — CLINICAL SUPPORT (OUTPATIENT)
Dept: REHABILITATION | Facility: HOSPITAL | Age: 71
End: 2021-08-09
Payer: MEDICARE

## 2021-08-09 ENCOUNTER — PATIENT MESSAGE (OUTPATIENT)
Dept: ALLERGY | Facility: CLINIC | Age: 71
End: 2021-08-09

## 2021-08-09 VITALS
HEIGHT: 62 IN | WEIGHT: 234.38 LBS | BODY MASS INDEX: 43.13 KG/M2 | OXYGEN SATURATION: 99 % | DIASTOLIC BLOOD PRESSURE: 60 MMHG | SYSTOLIC BLOOD PRESSURE: 102 MMHG | HEART RATE: 77 BPM

## 2021-08-09 DIAGNOSIS — E11.69 HYPERLIPIDEMIA ASSOCIATED WITH TYPE 2 DIABETES MELLITUS: ICD-10-CM

## 2021-08-09 DIAGNOSIS — E78.5 HYPERLIPIDEMIA ASSOCIATED WITH TYPE 2 DIABETES MELLITUS: ICD-10-CM

## 2021-08-09 DIAGNOSIS — E11.65 TYPE 2 DIABETES MELLITUS WITH HYPERGLYCEMIA, WITH LONG-TERM CURRENT USE OF INSULIN: ICD-10-CM

## 2021-08-09 DIAGNOSIS — I15.2 HYPERTENSION ASSOCIATED WITH DIABETES: ICD-10-CM

## 2021-08-09 DIAGNOSIS — R79.89 ABNORMAL CBC MEASUREMENT: ICD-10-CM

## 2021-08-09 DIAGNOSIS — G47.33 OSA (OBSTRUCTIVE SLEEP APNEA): ICD-10-CM

## 2021-08-09 DIAGNOSIS — Z79.4 TYPE 2 DIABETES MELLITUS WITH HYPERGLYCEMIA, WITH LONG-TERM CURRENT USE OF INSULIN: ICD-10-CM

## 2021-08-09 DIAGNOSIS — E11.59 HYPERTENSION ASSOCIATED WITH DIABETES: ICD-10-CM

## 2021-08-09 DIAGNOSIS — R26.9 GAIT ABNORMALITY: ICD-10-CM

## 2021-08-09 DIAGNOSIS — M85.80 OSTEOPENIA, UNSPECIFIED LOCATION: ICD-10-CM

## 2021-08-09 DIAGNOSIS — K74.60 HEPATIC CIRRHOSIS, UNSPECIFIED HEPATIC CIRRHOSIS TYPE, UNSPECIFIED WHETHER ASCITES PRESENT: ICD-10-CM

## 2021-08-09 DIAGNOSIS — Z12.31 SCREENING MAMMOGRAM, ENCOUNTER FOR: ICD-10-CM

## 2021-08-09 DIAGNOSIS — K74.60 HEPATIC CIRRHOSIS, UNSPECIFIED HEPATIC CIRRHOSIS TYPE, UNSPECIFIED WHETHER ASCITES PRESENT: Primary | ICD-10-CM

## 2021-08-09 DIAGNOSIS — Z23 NEED FOR HEPATITIS B VACCINATION: ICD-10-CM

## 2021-08-09 DIAGNOSIS — R29.898 IMPAIRED FLEXIBILITY OF LOWER EXTREMITY: ICD-10-CM

## 2021-08-09 PROCEDURE — 1159F PR MEDICATION LIST DOCUMENTED IN MEDICAL RECORD: ICD-10-PCS | Mod: HCNC,CPTII,S$GLB, | Performed by: INTERNAL MEDICINE

## 2021-08-09 PROCEDURE — 99214 PR OFFICE/OUTPT VISIT, EST, LEVL IV, 30-39 MIN: ICD-10-PCS | Mod: HCNC,25,S$GLB, | Performed by: INTERNAL MEDICINE

## 2021-08-09 PROCEDURE — 3074F SYST BP LT 130 MM HG: CPT | Mod: HCNC,CPTII,S$GLB, | Performed by: INTERNAL MEDICINE

## 2021-08-09 PROCEDURE — 1101F PR PT FALLS ASSESS DOC 0-1 FALLS W/OUT INJ PAST YR: ICD-10-PCS | Mod: HCNC,CPTII,S$GLB, | Performed by: INTERNAL MEDICINE

## 2021-08-09 PROCEDURE — 1101F PT FALLS ASSESS-DOCD LE1/YR: CPT | Mod: HCNC,CPTII,S$GLB, | Performed by: INTERNAL MEDICINE

## 2021-08-09 PROCEDURE — 97110 THERAPEUTIC EXERCISES: CPT | Mod: HCNC,PN,CQ

## 2021-08-09 PROCEDURE — G0010 ADMIN HEPATITIS B VACCINE: HCPCS | Mod: HCNC,S$GLB,, | Performed by: INTERNAL MEDICINE

## 2021-08-09 PROCEDURE — 97530 THERAPEUTIC ACTIVITIES: CPT | Mod: HCNC,PN

## 2021-08-09 PROCEDURE — 3008F PR BODY MASS INDEX (BMI) DOCUMENTED: ICD-10-PCS | Mod: HCNC,CPTII,S$GLB, | Performed by: INTERNAL MEDICINE

## 2021-08-09 PROCEDURE — 3008F BODY MASS INDEX DOCD: CPT | Mod: HCNC,CPTII,S$GLB, | Performed by: INTERNAL MEDICINE

## 2021-08-09 PROCEDURE — 3044F PR MOST RECENT HEMOGLOBIN A1C LEVEL <7.0%: ICD-10-PCS | Mod: HCNC,CPTII,S$GLB, | Performed by: INTERNAL MEDICINE

## 2021-08-09 PROCEDURE — 99214 OFFICE O/P EST MOD 30 MIN: CPT | Mod: HCNC,25,S$GLB, | Performed by: INTERNAL MEDICINE

## 2021-08-09 PROCEDURE — 3078F PR MOST RECENT DIASTOLIC BLOOD PRESSURE < 80 MM HG: ICD-10-PCS | Mod: HCNC,CPTII,S$GLB, | Performed by: INTERNAL MEDICINE

## 2021-08-09 PROCEDURE — 1126F PR PAIN SEVERITY QUANTIFIED, NO PAIN PRESENT: ICD-10-PCS | Mod: HCNC,CPTII,S$GLB, | Performed by: INTERNAL MEDICINE

## 2021-08-09 PROCEDURE — 90746 HEPB VACCINE 3 DOSE ADULT IM: CPT | Mod: HCNC,S$GLB,, | Performed by: INTERNAL MEDICINE

## 2021-08-09 PROCEDURE — 3078F DIAST BP <80 MM HG: CPT | Mod: HCNC,CPTII,S$GLB, | Performed by: INTERNAL MEDICINE

## 2021-08-09 PROCEDURE — 3074F PR MOST RECENT SYSTOLIC BLOOD PRESSURE < 130 MM HG: ICD-10-PCS | Mod: HCNC,CPTII,S$GLB, | Performed by: INTERNAL MEDICINE

## 2021-08-09 PROCEDURE — 1159F MED LIST DOCD IN RCRD: CPT | Mod: HCNC,CPTII,S$GLB, | Performed by: INTERNAL MEDICINE

## 2021-08-09 PROCEDURE — 99999 PR PBB SHADOW E&M-EST. PATIENT-LVL III: ICD-10-PCS | Mod: PBBFAC,HCNC,, | Performed by: INTERNAL MEDICINE

## 2021-08-09 PROCEDURE — 3288F FALL RISK ASSESSMENT DOCD: CPT | Mod: HCNC,CPTII,S$GLB, | Performed by: INTERNAL MEDICINE

## 2021-08-09 PROCEDURE — 1126F AMNT PAIN NOTED NONE PRSNT: CPT | Mod: HCNC,CPTII,S$GLB, | Performed by: INTERNAL MEDICINE

## 2021-08-09 PROCEDURE — 3288F PR FALLS RISK ASSESSMENT DOCUMENTED: ICD-10-PCS | Mod: HCNC,CPTII,S$GLB, | Performed by: INTERNAL MEDICINE

## 2021-08-09 PROCEDURE — 1157F PR ADVANCE CARE PLAN OR EQUIV PRESENT IN MEDICAL RECORD: ICD-10-PCS | Mod: HCNC,CPTII,S$GLB, | Performed by: INTERNAL MEDICINE

## 2021-08-09 PROCEDURE — 1157F ADVNC CARE PLAN IN RCRD: CPT | Mod: HCNC,CPTII,S$GLB, | Performed by: INTERNAL MEDICINE

## 2021-08-09 PROCEDURE — G0010 HEPATITIS B VACCINE ADULT IM: ICD-10-PCS | Mod: HCNC,S$GLB,, | Performed by: INTERNAL MEDICINE

## 2021-08-09 PROCEDURE — 90746 HEPATITIS B VACCINE ADULT IM: ICD-10-PCS | Mod: HCNC,S$GLB,, | Performed by: INTERNAL MEDICINE

## 2021-08-09 PROCEDURE — 3044F HG A1C LEVEL LT 7.0%: CPT | Mod: HCNC,CPTII,S$GLB, | Performed by: INTERNAL MEDICINE

## 2021-08-09 PROCEDURE — 99999 PR PBB SHADOW E&M-EST. PATIENT-LVL III: CPT | Mod: PBBFAC,HCNC,, | Performed by: INTERNAL MEDICINE

## 2021-08-09 RX ORDER — INSULIN DEGLUDEC 100 U/ML
25 INJECTION, SOLUTION SUBCUTANEOUS DAILY
COMMUNITY
End: 2022-02-14

## 2021-08-11 ENCOUNTER — CLINICAL SUPPORT (OUTPATIENT)
Dept: REHABILITATION | Facility: HOSPITAL | Age: 71
End: 2021-08-11
Payer: MEDICARE

## 2021-08-11 DIAGNOSIS — R26.9 GAIT ABNORMALITY: ICD-10-CM

## 2021-08-11 DIAGNOSIS — R29.898 IMPAIRED FLEXIBILITY OF LOWER EXTREMITY: ICD-10-CM

## 2021-08-11 PROCEDURE — 97530 THERAPEUTIC ACTIVITIES: CPT | Mod: HCNC,PN | Performed by: PHYSICAL THERAPIST

## 2021-08-11 PROCEDURE — 97110 THERAPEUTIC EXERCISES: CPT | Mod: HCNC,PN | Performed by: PHYSICAL THERAPIST

## 2021-08-16 ENCOUNTER — CLINICAL SUPPORT (OUTPATIENT)
Dept: REHABILITATION | Facility: HOSPITAL | Age: 71
End: 2021-08-16
Payer: MEDICARE

## 2021-08-16 DIAGNOSIS — R26.9 GAIT ABNORMALITY: ICD-10-CM

## 2021-08-16 DIAGNOSIS — R29.898 IMPAIRED FLEXIBILITY OF LOWER EXTREMITY: ICD-10-CM

## 2021-08-16 PROCEDURE — 99454 REM MNTR PHYSIOL PARAM 16-30: CPT | Mod: S$GLB,,, | Performed by: INTERNAL MEDICINE

## 2021-08-16 PROCEDURE — 99454 PR REMOTE MNTR, PHYS PARAM, INITIAL, EA 30 DAYS: ICD-10-PCS | Mod: S$GLB,,, | Performed by: INTERNAL MEDICINE

## 2021-08-16 PROCEDURE — 97110 THERAPEUTIC EXERCISES: CPT | Mod: HCNC,PN | Performed by: PHYSICAL THERAPIST

## 2021-08-25 ENCOUNTER — CLINICAL SUPPORT (OUTPATIENT)
Dept: INTERNAL MEDICINE | Facility: CLINIC | Age: 71
End: 2021-08-25
Payer: MEDICARE

## 2021-08-25 DIAGNOSIS — Z91.09 ALLERGY TO MITES: ICD-10-CM

## 2021-08-25 DIAGNOSIS — J30.81 ALLERGY TO CATS: ICD-10-CM

## 2021-08-25 DIAGNOSIS — J30.1 ALLERGIC RHINITIS DUE TO POLLEN, UNSPECIFIED SEASONALITY: Primary | ICD-10-CM

## 2021-08-25 PROCEDURE — 95115 IMMUNOTHERAPY ONE INJECTION: CPT | Mod: HCNC,S$GLB,, | Performed by: FAMILY MEDICINE

## 2021-08-25 PROCEDURE — 99499 UNLISTED E&M SERVICE: CPT | Mod: HCNC,S$GLB,, | Performed by: ALLERGY & IMMUNOLOGY

## 2021-08-25 PROCEDURE — 95115 PR IMMUNOTHERAPY, ONE INJECTION: ICD-10-PCS | Mod: HCNC,S$GLB,, | Performed by: FAMILY MEDICINE

## 2021-08-25 PROCEDURE — 99499 NO LOS: ICD-10-PCS | Mod: HCNC,S$GLB,, | Performed by: ALLERGY & IMMUNOLOGY

## 2021-08-27 ENCOUNTER — OFFICE VISIT (OUTPATIENT)
Dept: ORTHOPEDICS | Facility: CLINIC | Age: 71
End: 2021-08-27
Payer: MEDICARE

## 2021-08-27 ENCOUNTER — OFFICE VISIT (OUTPATIENT)
Dept: HEPATOLOGY | Facility: CLINIC | Age: 71
End: 2021-08-27
Payer: MEDICARE

## 2021-08-27 VITALS
SYSTOLIC BLOOD PRESSURE: 114 MMHG | OXYGEN SATURATION: 97 % | RESPIRATION RATE: 18 BRPM | HEART RATE: 80 BPM | WEIGHT: 234.69 LBS | TEMPERATURE: 99 F | HEIGHT: 62 IN | DIASTOLIC BLOOD PRESSURE: 57 MMHG | BODY MASS INDEX: 43.19 KG/M2

## 2021-08-27 VITALS — WEIGHT: 234.38 LBS | BODY MASS INDEX: 43.13 KG/M2 | HEIGHT: 62 IN

## 2021-08-27 DIAGNOSIS — M48.061 SPINAL STENOSIS OF LUMBAR REGION WITHOUT NEUROGENIC CLAUDICATION: Primary | ICD-10-CM

## 2021-08-27 DIAGNOSIS — K74.60 HEPATIC CIRRHOSIS, UNSPECIFIED HEPATIC CIRRHOSIS TYPE, UNSPECIFIED WHETHER ASCITES PRESENT: Primary | ICD-10-CM

## 2021-08-27 PROCEDURE — 3288F PR FALLS RISK ASSESSMENT DOCUMENTED: ICD-10-PCS | Mod: HCNC,CPTII,S$GLB, | Performed by: INTERNAL MEDICINE

## 2021-08-27 PROCEDURE — 3288F PR FALLS RISK ASSESSMENT DOCUMENTED: ICD-10-PCS | Mod: HCNC,CPTII,S$GLB, | Performed by: ORTHOPAEDIC SURGERY

## 2021-08-27 PROCEDURE — 1126F AMNT PAIN NOTED NONE PRSNT: CPT | Mod: HCNC,CPTII,S$GLB, | Performed by: ORTHOPAEDIC SURGERY

## 2021-08-27 PROCEDURE — 1126F PR PAIN SEVERITY QUANTIFIED, NO PAIN PRESENT: ICD-10-PCS | Mod: HCNC,CPTII,S$GLB, | Performed by: ORTHOPAEDIC SURGERY

## 2021-08-27 PROCEDURE — 1101F PR PT FALLS ASSESS DOC 0-1 FALLS W/OUT INJ PAST YR: ICD-10-PCS | Mod: HCNC,CPTII,S$GLB, | Performed by: ORTHOPAEDIC SURGERY

## 2021-08-27 PROCEDURE — 4010F ACE/ARB THERAPY RXD/TAKEN: CPT | Mod: HCNC,CPTII,S$GLB, | Performed by: INTERNAL MEDICINE

## 2021-08-27 PROCEDURE — 1157F ADVNC CARE PLAN IN RCRD: CPT | Mod: HCNC,CPTII,S$GLB, | Performed by: ORTHOPAEDIC SURGERY

## 2021-08-27 PROCEDURE — 3061F PR NEG MICROALBUMINURIA RESULT DOCUMENTED/REVIEW: ICD-10-PCS | Mod: HCNC,CPTII,S$GLB, | Performed by: INTERNAL MEDICINE

## 2021-08-27 PROCEDURE — 99214 PR OFFICE/OUTPT VISIT, EST, LEVL IV, 30-39 MIN: ICD-10-PCS | Mod: HCNC,S$GLB,, | Performed by: INTERNAL MEDICINE

## 2021-08-27 PROCEDURE — 3008F BODY MASS INDEX DOCD: CPT | Mod: HCNC,CPTII,S$GLB, | Performed by: ORTHOPAEDIC SURGERY

## 2021-08-27 PROCEDURE — 1159F MED LIST DOCD IN RCRD: CPT | Mod: HCNC,CPTII,S$GLB, | Performed by: INTERNAL MEDICINE

## 2021-08-27 PROCEDURE — 1157F PR ADVANCE CARE PLAN OR EQUIV PRESENT IN MEDICAL RECORD: ICD-10-PCS | Mod: HCNC,CPTII,S$GLB, | Performed by: ORTHOPAEDIC SURGERY

## 2021-08-27 PROCEDURE — 3078F PR MOST RECENT DIASTOLIC BLOOD PRESSURE < 80 MM HG: ICD-10-PCS | Mod: HCNC,CPTII,S$GLB, | Performed by: INTERNAL MEDICINE

## 2021-08-27 PROCEDURE — 3044F HG A1C LEVEL LT 7.0%: CPT | Mod: HCNC,CPTII,S$GLB, | Performed by: INTERNAL MEDICINE

## 2021-08-27 PROCEDURE — 3008F PR BODY MASS INDEX (BMI) DOCUMENTED: ICD-10-PCS | Mod: HCNC,CPTII,S$GLB, | Performed by: ORTHOPAEDIC SURGERY

## 2021-08-27 PROCEDURE — 1160F RVW MEDS BY RX/DR IN RCRD: CPT | Mod: HCNC,CPTII,S$GLB, | Performed by: INTERNAL MEDICINE

## 2021-08-27 PROCEDURE — 1126F PR PAIN SEVERITY QUANTIFIED, NO PAIN PRESENT: ICD-10-PCS | Mod: HCNC,CPTII,S$GLB, | Performed by: INTERNAL MEDICINE

## 2021-08-27 PROCEDURE — 3066F NEPHROPATHY DOC TX: CPT | Mod: HCNC,CPTII,S$GLB, | Performed by: INTERNAL MEDICINE

## 2021-08-27 PROCEDURE — 1126F AMNT PAIN NOTED NONE PRSNT: CPT | Mod: HCNC,CPTII,S$GLB, | Performed by: INTERNAL MEDICINE

## 2021-08-27 PROCEDURE — 3078F DIAST BP <80 MM HG: CPT | Mod: HCNC,CPTII,S$GLB, | Performed by: INTERNAL MEDICINE

## 2021-08-27 PROCEDURE — 3044F HG A1C LEVEL LT 7.0%: CPT | Mod: HCNC,CPTII,S$GLB, | Performed by: ORTHOPAEDIC SURGERY

## 2021-08-27 PROCEDURE — 3074F PR MOST RECENT SYSTOLIC BLOOD PRESSURE < 130 MM HG: ICD-10-PCS | Mod: HCNC,CPTII,S$GLB, | Performed by: INTERNAL MEDICINE

## 2021-08-27 PROCEDURE — 3066F PR DOCUMENTATION OF TREATMENT FOR NEPHROPATHY: ICD-10-PCS | Mod: HCNC,CPTII,S$GLB, | Performed by: INTERNAL MEDICINE

## 2021-08-27 PROCEDURE — 3061F NEG MICROALBUMINURIA REV: CPT | Mod: HCNC,CPTII,S$GLB, | Performed by: INTERNAL MEDICINE

## 2021-08-27 PROCEDURE — 3008F BODY MASS INDEX DOCD: CPT | Mod: HCNC,CPTII,S$GLB, | Performed by: INTERNAL MEDICINE

## 2021-08-27 PROCEDURE — 99214 OFFICE O/P EST MOD 30 MIN: CPT | Mod: HCNC,S$GLB,, | Performed by: INTERNAL MEDICINE

## 2021-08-27 PROCEDURE — 1159F MED LIST DOCD IN RCRD: CPT | Mod: HCNC,CPTII,S$GLB, | Performed by: ORTHOPAEDIC SURGERY

## 2021-08-27 PROCEDURE — 1101F PT FALLS ASSESS-DOCD LE1/YR: CPT | Mod: HCNC,CPTII,S$GLB, | Performed by: ORTHOPAEDIC SURGERY

## 2021-08-27 PROCEDURE — 3074F SYST BP LT 130 MM HG: CPT | Mod: HCNC,CPTII,S$GLB, | Performed by: INTERNAL MEDICINE

## 2021-08-27 PROCEDURE — 99999 PR PBB SHADOW E&M-EST. PATIENT-LVL V: ICD-10-PCS | Mod: PBBFAC,HCNC,, | Performed by: INTERNAL MEDICINE

## 2021-08-27 PROCEDURE — 4010F PR ACE/ARB THEARPY RXD/TAKEN: ICD-10-PCS | Mod: HCNC,CPTII,S$GLB, | Performed by: INTERNAL MEDICINE

## 2021-08-27 PROCEDURE — 3044F PR MOST RECENT HEMOGLOBIN A1C LEVEL <7.0%: ICD-10-PCS | Mod: HCNC,CPTII,S$GLB, | Performed by: INTERNAL MEDICINE

## 2021-08-27 PROCEDURE — 1101F PT FALLS ASSESS-DOCD LE1/YR: CPT | Mod: HCNC,CPTII,S$GLB, | Performed by: INTERNAL MEDICINE

## 2021-08-27 PROCEDURE — 1160F PR REVIEW ALL MEDS BY PRESCRIBER/CLIN PHARMACIST DOCUMENTED: ICD-10-PCS | Mod: HCNC,CPTII,S$GLB, | Performed by: INTERNAL MEDICINE

## 2021-08-27 PROCEDURE — 99212 PR OFFICE/OUTPT VISIT, EST, LEVL II, 10-19 MIN: ICD-10-PCS | Mod: HCNC,S$GLB,, | Performed by: ORTHOPAEDIC SURGERY

## 2021-08-27 PROCEDURE — 3008F PR BODY MASS INDEX (BMI) DOCUMENTED: ICD-10-PCS | Mod: HCNC,CPTII,S$GLB, | Performed by: INTERNAL MEDICINE

## 2021-08-27 PROCEDURE — 1157F ADVNC CARE PLAN IN RCRD: CPT | Mod: HCNC,CPTII,S$GLB, | Performed by: INTERNAL MEDICINE

## 2021-08-27 PROCEDURE — 99999 PR PBB SHADOW E&M-EST. PATIENT-LVL IV: ICD-10-PCS | Mod: PBBFAC,HCNC,, | Performed by: ORTHOPAEDIC SURGERY

## 2021-08-27 PROCEDURE — 3044F PR MOST RECENT HEMOGLOBIN A1C LEVEL <7.0%: ICD-10-PCS | Mod: HCNC,CPTII,S$GLB, | Performed by: ORTHOPAEDIC SURGERY

## 2021-08-27 PROCEDURE — 1157F PR ADVANCE CARE PLAN OR EQUIV PRESENT IN MEDICAL RECORD: ICD-10-PCS | Mod: HCNC,CPTII,S$GLB, | Performed by: INTERNAL MEDICINE

## 2021-08-27 PROCEDURE — 3288F FALL RISK ASSESSMENT DOCD: CPT | Mod: HCNC,CPTII,S$GLB, | Performed by: ORTHOPAEDIC SURGERY

## 2021-08-27 PROCEDURE — 1160F RVW MEDS BY RX/DR IN RCRD: CPT | Mod: HCNC,CPTII,S$GLB, | Performed by: ORTHOPAEDIC SURGERY

## 2021-08-27 PROCEDURE — 99999 PR PBB SHADOW E&M-EST. PATIENT-LVL V: CPT | Mod: PBBFAC,HCNC,, | Performed by: INTERNAL MEDICINE

## 2021-08-27 PROCEDURE — 1160F PR REVIEW ALL MEDS BY PRESCRIBER/CLIN PHARMACIST DOCUMENTED: ICD-10-PCS | Mod: HCNC,CPTII,S$GLB, | Performed by: ORTHOPAEDIC SURGERY

## 2021-08-27 PROCEDURE — 1159F PR MEDICATION LIST DOCUMENTED IN MEDICAL RECORD: ICD-10-PCS | Mod: HCNC,CPTII,S$GLB, | Performed by: INTERNAL MEDICINE

## 2021-08-27 PROCEDURE — 1101F PR PT FALLS ASSESS DOC 0-1 FALLS W/OUT INJ PAST YR: ICD-10-PCS | Mod: HCNC,CPTII,S$GLB, | Performed by: INTERNAL MEDICINE

## 2021-08-27 PROCEDURE — 3288F FALL RISK ASSESSMENT DOCD: CPT | Mod: HCNC,CPTII,S$GLB, | Performed by: INTERNAL MEDICINE

## 2021-08-27 PROCEDURE — 99212 OFFICE O/P EST SF 10 MIN: CPT | Mod: HCNC,S$GLB,, | Performed by: ORTHOPAEDIC SURGERY

## 2021-08-27 PROCEDURE — 1159F PR MEDICATION LIST DOCUMENTED IN MEDICAL RECORD: ICD-10-PCS | Mod: HCNC,CPTII,S$GLB, | Performed by: ORTHOPAEDIC SURGERY

## 2021-08-27 PROCEDURE — 99999 PR PBB SHADOW E&M-EST. PATIENT-LVL IV: CPT | Mod: PBBFAC,HCNC,, | Performed by: ORTHOPAEDIC SURGERY

## 2021-08-31 PROCEDURE — 99457 RPM TX MGMT 1ST 20 MIN: CPT | Mod: S$GLB,,, | Performed by: INTERNAL MEDICINE

## 2021-08-31 PROCEDURE — 99457 PR MONITORING, PHYSIOL PARAM, REMOTE, 1ST 20 MINS, PER MONTH: ICD-10-PCS | Mod: S$GLB,,, | Performed by: INTERNAL MEDICINE

## 2021-09-15 ENCOUNTER — PATIENT MESSAGE (OUTPATIENT)
Dept: ALLERGY | Facility: CLINIC | Age: 71
End: 2021-09-15

## 2021-09-17 PROCEDURE — 99454 REM MNTR PHYSIOL PARAM 16-30: CPT | Mod: S$GLB,,, | Performed by: INTERNAL MEDICINE

## 2021-09-17 PROCEDURE — 99454 PR REMOTE MNTR, PHYS PARAM, INITIAL, EA 30 DAYS: ICD-10-PCS | Mod: S$GLB,,, | Performed by: INTERNAL MEDICINE

## 2021-09-21 ENCOUNTER — TELEPHONE (OUTPATIENT)
Dept: ALLERGY | Facility: CLINIC | Age: 71
End: 2021-09-21

## 2021-09-24 ENCOUNTER — PATIENT MESSAGE (OUTPATIENT)
Dept: ENDOSCOPY | Facility: HOSPITAL | Age: 71
End: 2021-09-24

## 2021-09-24 DIAGNOSIS — K74.60 HEPATIC CIRRHOSIS, UNSPECIFIED HEPATIC CIRRHOSIS TYPE, UNSPECIFIED WHETHER ASCITES PRESENT: Primary | ICD-10-CM

## 2021-09-24 DIAGNOSIS — Z01.818 PRE-OP TESTING: Primary | ICD-10-CM

## 2021-10-03 ENCOUNTER — PATIENT MESSAGE (OUTPATIENT)
Dept: ADMINISTRATIVE | Facility: OTHER | Age: 71
End: 2021-10-03

## 2021-10-07 ENCOUNTER — PATIENT MESSAGE (OUTPATIENT)
Dept: INTERNAL MEDICINE | Facility: CLINIC | Age: 71
End: 2021-10-07

## 2021-10-11 ENCOUNTER — HOSPITAL ENCOUNTER (OUTPATIENT)
Dept: RADIOLOGY | Facility: HOSPITAL | Age: 71
Discharge: HOME | End: 2021-10-11
Attending: INTERNAL MEDICINE
Payer: MEDICARE

## 2021-10-11 ENCOUNTER — OFFICE VISIT (OUTPATIENT)
Dept: UROLOGY | Facility: CLINIC | Age: 71
End: 2021-10-11
Payer: MEDICARE

## 2021-10-11 VITALS
HEIGHT: 62 IN | DIASTOLIC BLOOD PRESSURE: 58 MMHG | WEIGHT: 236.13 LBS | HEART RATE: 70 BPM | BODY MASS INDEX: 43.45 KG/M2 | SYSTOLIC BLOOD PRESSURE: 116 MMHG

## 2021-10-11 DIAGNOSIS — N39.0 RECURRENT UTI: ICD-10-CM

## 2021-10-11 DIAGNOSIS — Z12.31 SCREENING MAMMOGRAM, ENCOUNTER FOR: ICD-10-CM

## 2021-10-11 DIAGNOSIS — N95.2 VAGINAL ATROPHY: Primary | ICD-10-CM

## 2021-10-11 PROCEDURE — 77067 SCR MAMMO BI INCL CAD: CPT | Mod: TC,HCNC

## 2021-10-11 PROCEDURE — 77067 SCR MAMMO BI INCL CAD: CPT | Mod: 26,HCNC,, | Performed by: RADIOLOGY

## 2021-10-11 PROCEDURE — 1126F PR PAIN SEVERITY QUANTIFIED, NO PAIN PRESENT: ICD-10-PCS | Mod: HCNC,CPTII,S$GLB, | Performed by: UROLOGY

## 2021-10-11 PROCEDURE — 1101F PT FALLS ASSESS-DOCD LE1/YR: CPT | Mod: HCNC,CPTII,S$GLB, | Performed by: UROLOGY

## 2021-10-11 PROCEDURE — 1101F PR PT FALLS ASSESS DOC 0-1 FALLS W/OUT INJ PAST YR: ICD-10-PCS | Mod: HCNC,CPTII,S$GLB, | Performed by: UROLOGY

## 2021-10-11 PROCEDURE — 3066F NEPHROPATHY DOC TX: CPT | Mod: HCNC,CPTII,S$GLB, | Performed by: UROLOGY

## 2021-10-11 PROCEDURE — 77063 BREAST TOMOSYNTHESIS BI: CPT | Mod: 26,HCNC,, | Performed by: RADIOLOGY

## 2021-10-11 PROCEDURE — 99499 UNLISTED E&M SERVICE: CPT | Mod: HCNC,S$GLB,, | Performed by: UROLOGY

## 2021-10-11 PROCEDURE — 3288F PR FALLS RISK ASSESSMENT DOCUMENTED: ICD-10-PCS | Mod: HCNC,CPTII,S$GLB, | Performed by: UROLOGY

## 2021-10-11 PROCEDURE — 77063 MAMMO DIGITAL SCREENING BILAT WITH TOMO: ICD-10-PCS | Mod: 26,HCNC,, | Performed by: RADIOLOGY

## 2021-10-11 PROCEDURE — 3074F SYST BP LT 130 MM HG: CPT | Mod: HCNC,CPTII,S$GLB, | Performed by: UROLOGY

## 2021-10-11 PROCEDURE — 3078F DIAST BP <80 MM HG: CPT | Mod: HCNC,CPTII,S$GLB, | Performed by: UROLOGY

## 2021-10-11 PROCEDURE — 3288F FALL RISK ASSESSMENT DOCD: CPT | Mod: HCNC,CPTII,S$GLB, | Performed by: UROLOGY

## 2021-10-11 PROCEDURE — 3066F PR DOCUMENTATION OF TREATMENT FOR NEPHROPATHY: ICD-10-PCS | Mod: HCNC,CPTII,S$GLB, | Performed by: UROLOGY

## 2021-10-11 PROCEDURE — 99499 RISK ADDL DX/OHS AUDIT: ICD-10-PCS | Mod: HCNC,S$GLB,, | Performed by: UROLOGY

## 2021-10-11 PROCEDURE — 99999 PR PBB SHADOW E&M-EST. PATIENT-LVL III: CPT | Mod: PBBFAC,HCNC,, | Performed by: UROLOGY

## 2021-10-11 PROCEDURE — 3008F BODY MASS INDEX DOCD: CPT | Mod: HCNC,CPTII,S$GLB, | Performed by: UROLOGY

## 2021-10-11 PROCEDURE — 99999 PR PBB SHADOW E&M-EST. PATIENT-LVL III: ICD-10-PCS | Mod: PBBFAC,HCNC,, | Performed by: UROLOGY

## 2021-10-11 PROCEDURE — 3061F NEG MICROALBUMINURIA REV: CPT | Mod: HCNC,CPTII,S$GLB, | Performed by: UROLOGY

## 2021-10-11 PROCEDURE — 4010F PR ACE/ARB THEARPY RXD/TAKEN: ICD-10-PCS | Mod: HCNC,CPTII,S$GLB, | Performed by: UROLOGY

## 2021-10-11 PROCEDURE — 3044F HG A1C LEVEL LT 7.0%: CPT | Mod: HCNC,CPTII,S$GLB, | Performed by: UROLOGY

## 2021-10-11 PROCEDURE — 1126F AMNT PAIN NOTED NONE PRSNT: CPT | Mod: HCNC,CPTII,S$GLB, | Performed by: UROLOGY

## 2021-10-11 PROCEDURE — 99214 PR OFFICE/OUTPT VISIT, EST, LEVL IV, 30-39 MIN: ICD-10-PCS | Mod: HCNC,S$GLB,, | Performed by: UROLOGY

## 2021-10-11 PROCEDURE — 1157F PR ADVANCE CARE PLAN OR EQUIV PRESENT IN MEDICAL RECORD: ICD-10-PCS | Mod: HCNC,CPTII,S$GLB, | Performed by: UROLOGY

## 2021-10-11 PROCEDURE — 81002 PR URINALYSIS NONAUTO W/O SCOPE: ICD-10-PCS | Mod: HCNC,S$GLB,, | Performed by: UROLOGY

## 2021-10-11 PROCEDURE — 81002 URINALYSIS NONAUTO W/O SCOPE: CPT | Mod: HCNC,S$GLB,, | Performed by: UROLOGY

## 2021-10-11 PROCEDURE — 99214 OFFICE O/P EST MOD 30 MIN: CPT | Mod: HCNC,S$GLB,, | Performed by: UROLOGY

## 2021-10-11 PROCEDURE — 3008F PR BODY MASS INDEX (BMI) DOCUMENTED: ICD-10-PCS | Mod: HCNC,CPTII,S$GLB, | Performed by: UROLOGY

## 2021-10-11 PROCEDURE — 3044F PR MOST RECENT HEMOGLOBIN A1C LEVEL <7.0%: ICD-10-PCS | Mod: HCNC,CPTII,S$GLB, | Performed by: UROLOGY

## 2021-10-11 PROCEDURE — 77067 MAMMO DIGITAL SCREENING BILAT WITH TOMO: ICD-10-PCS | Mod: 26,HCNC,, | Performed by: RADIOLOGY

## 2021-10-11 PROCEDURE — 3074F PR MOST RECENT SYSTOLIC BLOOD PRESSURE < 130 MM HG: ICD-10-PCS | Mod: HCNC,CPTII,S$GLB, | Performed by: UROLOGY

## 2021-10-11 PROCEDURE — 1157F ADVNC CARE PLAN IN RCRD: CPT | Mod: HCNC,CPTII,S$GLB, | Performed by: UROLOGY

## 2021-10-11 PROCEDURE — 4010F ACE/ARB THERAPY RXD/TAKEN: CPT | Mod: HCNC,CPTII,S$GLB, | Performed by: UROLOGY

## 2021-10-11 PROCEDURE — 3061F PR NEG MICROALBUMINURIA RESULT DOCUMENTED/REVIEW: ICD-10-PCS | Mod: HCNC,CPTII,S$GLB, | Performed by: UROLOGY

## 2021-10-11 PROCEDURE — 3078F PR MOST RECENT DIASTOLIC BLOOD PRESSURE < 80 MM HG: ICD-10-PCS | Mod: HCNC,CPTII,S$GLB, | Performed by: UROLOGY

## 2021-10-12 ENCOUNTER — LAB VISIT (OUTPATIENT)
Dept: FAMILY MEDICINE | Facility: CLINIC | Age: 71
End: 2021-10-12
Payer: MEDICARE

## 2021-10-12 DIAGNOSIS — Z01.818 PRE-OP TESTING: ICD-10-CM

## 2021-10-12 LAB
SARS-COV-2 RNA RESP QL NAA+PROBE: NOT DETECTED
SARS-COV-2- CYCLE NUMBER: NORMAL

## 2021-10-12 PROCEDURE — U0005 INFEC AGEN DETEC AMPLI PROBE: HCPCS | Performed by: FAMILY MEDICINE

## 2021-10-12 PROCEDURE — U0003 INFECTIOUS AGENT DETECTION BY NUCLEIC ACID (DNA OR RNA); SEVERE ACUTE RESPIRATORY SYNDROME CORONAVIRUS 2 (SARS-COV-2) (CORONAVIRUS DISEASE [COVID-19]), AMPLIFIED PROBE TECHNIQUE, MAKING USE OF HIGH THROUGHPUT TECHNOLOGIES AS DESCRIBED BY CMS-2020-01-R: HCPCS | Mod: HCNC | Performed by: FAMILY MEDICINE

## 2021-10-15 ENCOUNTER — ANESTHESIA (OUTPATIENT)
Dept: ENDOSCOPY | Facility: HOSPITAL | Age: 71
End: 2021-10-15
Payer: MEDICARE

## 2021-10-15 ENCOUNTER — HOSPITAL ENCOUNTER (OUTPATIENT)
Facility: HOSPITAL | Age: 71
Discharge: HOME | End: 2021-10-15
Attending: INTERNAL MEDICINE | Admitting: INTERNAL MEDICINE
Payer: MEDICARE

## 2021-10-15 ENCOUNTER — ANESTHESIA EVENT (OUTPATIENT)
Dept: ENDOSCOPY | Facility: HOSPITAL | Age: 71
End: 2021-10-15
Payer: MEDICARE

## 2021-10-15 VITALS
HEIGHT: 62 IN | BODY MASS INDEX: 43.24 KG/M2 | SYSTOLIC BLOOD PRESSURE: 117 MMHG | WEIGHT: 235 LBS | DIASTOLIC BLOOD PRESSURE: 55 MMHG | OXYGEN SATURATION: 98 % | HEART RATE: 65 BPM | RESPIRATION RATE: 14 BRPM | TEMPERATURE: 98 F

## 2021-10-15 DIAGNOSIS — I85.00 VARICES, ESOPHAGEAL: ICD-10-CM

## 2021-10-15 LAB — POCT GLUCOSE: 115 MG/DL (ref 70–110)

## 2021-10-15 PROCEDURE — 63600175 PHARM REV CODE 636 W HCPCS: Mod: HCNC | Performed by: NURSE ANESTHETIST, CERTIFIED REGISTERED

## 2021-10-15 PROCEDURE — E9220 PRA ENDO ANESTHESIA: HCPCS | Mod: HCNC,,, | Performed by: NURSE ANESTHETIST, CERTIFIED REGISTERED

## 2021-10-15 PROCEDURE — 25000003 PHARM REV CODE 250: Mod: HCNC | Performed by: NURSE ANESTHETIST, CERTIFIED REGISTERED

## 2021-10-15 PROCEDURE — 43235 EGD DIAGNOSTIC BRUSH WASH: CPT | Mod: HCNC | Performed by: INTERNAL MEDICINE

## 2021-10-15 PROCEDURE — 43235 PR EGD, FLEX, DIAGNOSTIC: ICD-10-PCS | Mod: HCNC,,, | Performed by: INTERNAL MEDICINE

## 2021-10-15 PROCEDURE — E9220 PRA ENDO ANESTHESIA: ICD-10-PCS | Mod: HCNC,,, | Performed by: NURSE ANESTHETIST, CERTIFIED REGISTERED

## 2021-10-15 PROCEDURE — 25000003 PHARM REV CODE 250: Mod: HCNC | Performed by: INTERNAL MEDICINE

## 2021-10-15 PROCEDURE — 82962 GLUCOSE BLOOD TEST: CPT | Mod: HCNC | Performed by: INTERNAL MEDICINE

## 2021-10-15 PROCEDURE — 37000008 HC ANESTHESIA 1ST 15 MINUTES: Mod: HCNC | Performed by: INTERNAL MEDICINE

## 2021-10-15 PROCEDURE — 37000009 HC ANESTHESIA EA ADD 15 MINS: Mod: HCNC | Performed by: INTERNAL MEDICINE

## 2021-10-15 PROCEDURE — 43235 EGD DIAGNOSTIC BRUSH WASH: CPT | Mod: HCNC,,, | Performed by: INTERNAL MEDICINE

## 2021-10-15 RX ORDER — ONDANSETRON 2 MG/ML
INJECTION INTRAMUSCULAR; INTRAVENOUS
Status: DISCONTINUED | OUTPATIENT
Start: 2021-10-15 | End: 2021-10-15

## 2021-10-15 RX ORDER — SODIUM CHLORIDE 0.9 % (FLUSH) 0.9 %
10 SYRINGE (ML) INJECTION
Status: CANCELLED | OUTPATIENT
Start: 2021-10-15

## 2021-10-15 RX ORDER — SODIUM CHLORIDE 9 MG/ML
INJECTION, SOLUTION INTRAVENOUS CONTINUOUS
Status: DISCONTINUED | OUTPATIENT
Start: 2021-10-15 | End: 2021-10-15 | Stop reason: HOSPADM

## 2021-10-15 RX ORDER — PROPOFOL 10 MG/ML
VIAL (ML) INTRAVENOUS
Status: DISCONTINUED | OUTPATIENT
Start: 2021-10-15 | End: 2021-10-15

## 2021-10-15 RX ORDER — LIDOCAINE HYDROCHLORIDE 20 MG/ML
INJECTION, SOLUTION EPIDURAL; INFILTRATION; INTRACAUDAL; PERINEURAL
Status: DISCONTINUED | OUTPATIENT
Start: 2021-10-15 | End: 2021-10-15

## 2021-10-15 RX ORDER — DEXAMETHASONE SODIUM PHOSPHATE 4 MG/ML
INJECTION, SOLUTION INTRA-ARTICULAR; INTRALESIONAL; INTRAMUSCULAR; INTRAVENOUS; SOFT TISSUE
Status: DISCONTINUED | OUTPATIENT
Start: 2021-10-15 | End: 2021-10-15

## 2021-10-15 RX ORDER — SODIUM CHLORIDE 9 MG/ML
INJECTION, SOLUTION INTRAVENOUS CONTINUOUS
Status: CANCELLED | OUTPATIENT
Start: 2021-10-15

## 2021-10-15 RX ORDER — PROPOFOL 10 MG/ML
VIAL (ML) INTRAVENOUS CONTINUOUS PRN
Status: DISCONTINUED | OUTPATIENT
Start: 2021-10-15 | End: 2021-10-15

## 2021-10-15 RX ADMIN — PROPOFOL 100 MG: 10 INJECTION, EMULSION INTRAVENOUS at 11:10

## 2021-10-15 RX ADMIN — ONDANSETRON 4 MG: 2 INJECTION INTRAMUSCULAR; INTRAVENOUS at 11:10

## 2021-10-15 RX ADMIN — SODIUM CHLORIDE: 0.9 INJECTION, SOLUTION INTRAVENOUS at 11:10

## 2021-10-15 RX ADMIN — GLYCOPYRROLATE 0.2 MG: 0.2 INJECTION, SOLUTION INTRAMUSCULAR; INTRAVITREAL at 11:10

## 2021-10-15 RX ADMIN — Medication 100 MCG/KG/MIN: at 11:10

## 2021-10-15 RX ADMIN — LIDOCAINE HYDROCHLORIDE 50 MG: 20 INJECTION, SOLUTION EPIDURAL; INFILTRATION; INTRACAUDAL at 11:10

## 2021-10-15 RX ADMIN — DEXAMETHASONE SODIUM PHOSPHATE 4 MG: 4 INJECTION INTRA-ARTICULAR; INTRALESIONAL; INTRAMUSCULAR; INTRAVENOUS; SOFT TISSUE at 11:10

## 2021-10-19 ENCOUNTER — TELEPHONE (OUTPATIENT)
Dept: GASTROENTEROLOGY | Facility: CLINIC | Age: 71
End: 2021-10-19

## 2021-10-29 PROCEDURE — 99454 REM MNTR PHYSIOL PARAM 16-30: CPT | Mod: S$GLB,,, | Performed by: INTERNAL MEDICINE

## 2021-10-29 PROCEDURE — 99454 PR REMOTE MNTR, PHYS PARAM, INITIAL, EA 30 DAYS: ICD-10-PCS | Mod: S$GLB,,, | Performed by: INTERNAL MEDICINE

## 2021-11-03 ENCOUNTER — IMMUNIZATION (OUTPATIENT)
Dept: PHARMACY | Facility: CLINIC | Age: 71
End: 2021-11-03
Payer: MEDICARE

## 2021-11-03 DIAGNOSIS — Z23 NEED FOR VACCINATION: Primary | ICD-10-CM

## 2021-11-17 PROCEDURE — 99454 REM MNTR PHYSIOL PARAM 16-30: CPT | Mod: S$GLB,,, | Performed by: INTERNAL MEDICINE

## 2021-11-17 PROCEDURE — 99454 PR REMOTE MNTR, PHYS PARAM, INITIAL, EA 30 DAYS: ICD-10-PCS | Mod: S$GLB,,, | Performed by: INTERNAL MEDICINE

## 2021-12-03 ENCOUNTER — HOSPITAL ENCOUNTER (OUTPATIENT)
Dept: RADIOLOGY | Facility: HOSPITAL | Age: 71
Discharge: HOME | End: 2021-12-03
Attending: INTERNAL MEDICINE
Payer: MEDICARE

## 2021-12-03 DIAGNOSIS — K74.60 HEPATIC CIRRHOSIS, UNSPECIFIED HEPATIC CIRRHOSIS TYPE, UNSPECIFIED WHETHER ASCITES PRESENT: ICD-10-CM

## 2021-12-03 PROCEDURE — 76700 US EXAM ABDOM COMPLETE: CPT | Mod: 26,HCNC,, | Performed by: INTERNAL MEDICINE

## 2021-12-03 PROCEDURE — 76700 US ABDOMEN COMPLETE: ICD-10-PCS | Mod: 26,HCNC,, | Performed by: INTERNAL MEDICINE

## 2021-12-03 PROCEDURE — 76700 US EXAM ABDOM COMPLETE: CPT | Mod: TC,HCNC

## 2021-12-17 PROCEDURE — 99454 REM MNTR PHYSIOL PARAM 16-30: CPT | Mod: S$GLB,,, | Performed by: INTERNAL MEDICINE

## 2021-12-17 PROCEDURE — 99454 PR REMOTE MNTR, PHYS PARAM, INITIAL, EA 30 DAYS: ICD-10-PCS | Mod: S$GLB,,, | Performed by: INTERNAL MEDICINE

## 2021-12-20 ENCOUNTER — PATIENT MESSAGE (OUTPATIENT)
Dept: ALLERGY | Facility: CLINIC | Age: 71
End: 2021-12-20
Payer: MEDICARE

## 2022-01-04 ENCOUNTER — PATIENT MESSAGE (OUTPATIENT)
Dept: ALLERGY | Facility: CLINIC | Age: 72
End: 2022-01-04
Payer: MEDICARE

## 2022-01-14 ENCOUNTER — CLINICAL SUPPORT (OUTPATIENT)
Dept: INTERNAL MEDICINE | Facility: CLINIC | Age: 72
End: 2022-01-14
Payer: MEDICARE

## 2022-01-14 ENCOUNTER — OFFICE VISIT (OUTPATIENT)
Dept: ALLERGY | Facility: CLINIC | Age: 72
End: 2022-01-14
Payer: MEDICARE

## 2022-01-14 ENCOUNTER — OFFICE VISIT (OUTPATIENT)
Dept: PODIATRY | Facility: CLINIC | Age: 72
End: 2022-01-14
Payer: MEDICARE

## 2022-01-14 VITALS — BODY MASS INDEX: 43.41 KG/M2 | WEIGHT: 235.88 LBS | HEIGHT: 62 IN

## 2022-01-14 VITALS
BODY MASS INDEX: 43.24 KG/M2 | HEART RATE: 73 BPM | WEIGHT: 235 LBS | SYSTOLIC BLOOD PRESSURE: 150 MMHG | HEIGHT: 62 IN | DIASTOLIC BLOOD PRESSURE: 67 MMHG

## 2022-01-14 DIAGNOSIS — G57.90 ENTRAPMENT NEUROPATHY OF PERIPHERAL NERVE OF LOWER EXTREMITY: ICD-10-CM

## 2022-01-14 DIAGNOSIS — J30.9 CHRONIC ALLERGIC RHINITIS: ICD-10-CM

## 2022-01-14 DIAGNOSIS — M89.8X7 EXOSTOSIS OF TOE: ICD-10-CM

## 2022-01-14 DIAGNOSIS — I87.2 VENOUS INSUFFICIENCY OF BOTH LOWER EXTREMITIES: ICD-10-CM

## 2022-01-14 DIAGNOSIS — Z51.6 ALLERGY DESENSITIZATION THERAPY: ICD-10-CM

## 2022-01-14 DIAGNOSIS — J30.89 ALLERGIC RHINITIS DUE TO DUST MITE: ICD-10-CM

## 2022-01-14 DIAGNOSIS — J30.9 CHRONIC ALLERGIC RHINITIS: Primary | ICD-10-CM

## 2022-01-14 DIAGNOSIS — E11.49 TYPE 2 DIABETES MELLITUS WITH NEUROLOGICAL MANIFESTATIONS: Primary | ICD-10-CM

## 2022-01-14 DIAGNOSIS — H10.13 ALLERGIC CONJUNCTIVITIS, BILATERAL: ICD-10-CM

## 2022-01-14 PROCEDURE — 95115 IMMUNOTHERAPY ONE INJECTION: CPT | Mod: HCNC,S$GLB,, | Performed by: FAMILY MEDICINE

## 2022-01-14 PROCEDURE — 1101F PT FALLS ASSESS-DOCD LE1/YR: CPT | Mod: HCNC,CPTII,S$GLB, | Performed by: PODIATRIST

## 2022-01-14 PROCEDURE — 3077F SYST BP >= 140 MM HG: CPT | Mod: HCNC,CPTII,S$GLB, | Performed by: PODIATRIST

## 2022-01-14 PROCEDURE — 1126F AMNT PAIN NOTED NONE PRSNT: CPT | Mod: HCNC,CPTII,S$GLB, | Performed by: ALLERGY & IMMUNOLOGY

## 2022-01-14 PROCEDURE — 1159F MED LIST DOCD IN RCRD: CPT | Mod: HCNC,CPTII,S$GLB, | Performed by: PODIATRIST

## 2022-01-14 PROCEDURE — 3008F PR BODY MASS INDEX (BMI) DOCUMENTED: ICD-10-PCS | Mod: HCNC,CPTII,S$GLB, | Performed by: PODIATRIST

## 2022-01-14 PROCEDURE — 3288F PR FALLS RISK ASSESSMENT DOCUMENTED: ICD-10-PCS | Mod: HCNC,CPTII,S$GLB, | Performed by: PODIATRIST

## 2022-01-14 PROCEDURE — 1157F PR ADVANCE CARE PLAN OR EQUIV PRESENT IN MEDICAL RECORD: ICD-10-PCS | Mod: HCNC,CPTII,S$GLB, | Performed by: PODIATRIST

## 2022-01-14 PROCEDURE — 3078F PR MOST RECENT DIASTOLIC BLOOD PRESSURE < 80 MM HG: ICD-10-PCS | Mod: HCNC,CPTII,S$GLB, | Performed by: PODIATRIST

## 2022-01-14 PROCEDURE — 99999 PR PBB SHADOW E&M-EST. PATIENT-LVL IV: ICD-10-PCS | Mod: PBBFAC,HCNC,, | Performed by: ALLERGY & IMMUNOLOGY

## 2022-01-14 PROCEDURE — 99999 PR PBB SHADOW E&M-EST. PATIENT-LVL III: ICD-10-PCS | Mod: PBBFAC,HCNC,, | Performed by: PODIATRIST

## 2022-01-14 PROCEDURE — 3072F PR LOW RISK FOR RETINOPATHY: ICD-10-PCS | Mod: HCNC,CPTII,S$GLB, | Performed by: ALLERGY & IMMUNOLOGY

## 2022-01-14 PROCEDURE — 1159F PR MEDICATION LIST DOCUMENTED IN MEDICAL RECORD: ICD-10-PCS | Mod: HCNC,CPTII,S$GLB, | Performed by: PODIATRIST

## 2022-01-14 PROCEDURE — 99499 NO LOS: ICD-10-PCS | Mod: HCNC,S$GLB,, | Performed by: ALLERGY & IMMUNOLOGY

## 2022-01-14 PROCEDURE — 99999 PR PBB SHADOW E&M-EST. PATIENT-LVL III: CPT | Mod: PBBFAC,HCNC,, | Performed by: PODIATRIST

## 2022-01-14 PROCEDURE — 1101F PR PT FALLS ASSESS DOC 0-1 FALLS W/OUT INJ PAST YR: ICD-10-PCS | Mod: HCNC,CPTII,S$GLB, | Performed by: PODIATRIST

## 2022-01-14 PROCEDURE — 1160F RVW MEDS BY RX/DR IN RCRD: CPT | Mod: HCNC,CPTII,S$GLB, | Performed by: ALLERGY & IMMUNOLOGY

## 2022-01-14 PROCEDURE — 1160F RVW MEDS BY RX/DR IN RCRD: CPT | Mod: HCNC,CPTII,S$GLB, | Performed by: PODIATRIST

## 2022-01-14 PROCEDURE — 99213 OFFICE O/P EST LOW 20 MIN: CPT | Mod: HCNC,S$GLB,, | Performed by: PODIATRIST

## 2022-01-14 PROCEDURE — 1157F ADVNC CARE PLAN IN RCRD: CPT | Mod: HCNC,CPTII,S$GLB, | Performed by: ALLERGY & IMMUNOLOGY

## 2022-01-14 PROCEDURE — 3072F PR LOW RISK FOR RETINOPATHY: ICD-10-PCS | Mod: HCNC,CPTII,S$GLB, | Performed by: PODIATRIST

## 2022-01-14 PROCEDURE — 1157F ADVNC CARE PLAN IN RCRD: CPT | Mod: HCNC,CPTII,S$GLB, | Performed by: PODIATRIST

## 2022-01-14 PROCEDURE — 1159F PR MEDICATION LIST DOCUMENTED IN MEDICAL RECORD: ICD-10-PCS | Mod: HCNC,CPTII,S$GLB, | Performed by: ALLERGY & IMMUNOLOGY

## 2022-01-14 PROCEDURE — 99999 PR PBB SHADOW E&M-EST. PATIENT-LVL IV: CPT | Mod: PBBFAC,HCNC,, | Performed by: ALLERGY & IMMUNOLOGY

## 2022-01-14 PROCEDURE — 1157F PR ADVANCE CARE PLAN OR EQUIV PRESENT IN MEDICAL RECORD: ICD-10-PCS | Mod: HCNC,CPTII,S$GLB, | Performed by: ALLERGY & IMMUNOLOGY

## 2022-01-14 PROCEDURE — 99213 PR OFFICE/OUTPT VISIT, EST, LEVL III, 20-29 MIN: ICD-10-PCS | Mod: HCNC,S$GLB,, | Performed by: PODIATRIST

## 2022-01-14 PROCEDURE — 99499 UNLISTED E&M SERVICE: CPT | Mod: HCNC,S$GLB,, | Performed by: ALLERGY & IMMUNOLOGY

## 2022-01-14 PROCEDURE — 1126F PR PAIN SEVERITY QUANTIFIED, NO PAIN PRESENT: ICD-10-PCS | Mod: HCNC,CPTII,S$GLB, | Performed by: PODIATRIST

## 2022-01-14 PROCEDURE — 99214 OFFICE O/P EST MOD 30 MIN: CPT | Mod: HCNC,S$GLB,, | Performed by: ALLERGY & IMMUNOLOGY

## 2022-01-14 PROCEDURE — 1126F AMNT PAIN NOTED NONE PRSNT: CPT | Mod: HCNC,CPTII,S$GLB, | Performed by: PODIATRIST

## 2022-01-14 PROCEDURE — 3008F BODY MASS INDEX DOCD: CPT | Mod: HCNC,CPTII,S$GLB, | Performed by: ALLERGY & IMMUNOLOGY

## 2022-01-14 PROCEDURE — 1126F PR PAIN SEVERITY QUANTIFIED, NO PAIN PRESENT: ICD-10-PCS | Mod: HCNC,CPTII,S$GLB, | Performed by: ALLERGY & IMMUNOLOGY

## 2022-01-14 PROCEDURE — 3078F DIAST BP <80 MM HG: CPT | Mod: HCNC,CPTII,S$GLB, | Performed by: PODIATRIST

## 2022-01-14 PROCEDURE — 3008F BODY MASS INDEX DOCD: CPT | Mod: HCNC,CPTII,S$GLB, | Performed by: PODIATRIST

## 2022-01-14 PROCEDURE — 1159F MED LIST DOCD IN RCRD: CPT | Mod: HCNC,CPTII,S$GLB, | Performed by: ALLERGY & IMMUNOLOGY

## 2022-01-14 PROCEDURE — 1160F PR REVIEW ALL MEDS BY PRESCRIBER/CLIN PHARMACIST DOCUMENTED: ICD-10-PCS | Mod: HCNC,CPTII,S$GLB, | Performed by: ALLERGY & IMMUNOLOGY

## 2022-01-14 PROCEDURE — 1160F PR REVIEW ALL MEDS BY PRESCRIBER/CLIN PHARMACIST DOCUMENTED: ICD-10-PCS | Mod: HCNC,CPTII,S$GLB, | Performed by: PODIATRIST

## 2022-01-14 PROCEDURE — 3072F LOW RISK FOR RETINOPATHY: CPT | Mod: HCNC,CPTII,S$GLB, | Performed by: ALLERGY & IMMUNOLOGY

## 2022-01-14 PROCEDURE — 3072F LOW RISK FOR RETINOPATHY: CPT | Mod: HCNC,CPTII,S$GLB, | Performed by: PODIATRIST

## 2022-01-14 PROCEDURE — 95115 PR IMMUNOTHERAPY, ONE INJECTION: ICD-10-PCS | Mod: HCNC,S$GLB,, | Performed by: FAMILY MEDICINE

## 2022-01-14 PROCEDURE — 3288F FALL RISK ASSESSMENT DOCD: CPT | Mod: HCNC,CPTII,S$GLB, | Performed by: PODIATRIST

## 2022-01-14 PROCEDURE — 3077F PR MOST RECENT SYSTOLIC BLOOD PRESSURE >= 140 MM HG: ICD-10-PCS | Mod: HCNC,CPTII,S$GLB, | Performed by: PODIATRIST

## 2022-01-14 PROCEDURE — 99214 PR OFFICE/OUTPT VISIT, EST, LEVL IV, 30-39 MIN: ICD-10-PCS | Mod: HCNC,S$GLB,, | Performed by: ALLERGY & IMMUNOLOGY

## 2022-01-14 PROCEDURE — 3008F PR BODY MASS INDEX (BMI) DOCUMENTED: ICD-10-PCS | Mod: HCNC,CPTII,S$GLB, | Performed by: ALLERGY & IMMUNOLOGY

## 2022-01-14 RX ORDER — BENZONATATE 200 MG/1
200 CAPSULE ORAL 3 TIMES DAILY PRN
Qty: 90 CAPSULE | Refills: 1 | Status: SHIPPED | OUTPATIENT
Start: 2022-01-14 | End: 2022-01-24

## 2022-01-14 NOTE — PROGRESS NOTES
Subjective:      Patient ID: Vivienne Jose is a 71 y.o. female.    Chief Complaint: Diabetic Foot Exam    Vivienne is a 71 y.o. female who presents to the clinic for evaluation and treatment of high risk feet.  Vivienne has a past medical history of Allergy, Angio-edema, Arthritis, Cataract, Cerebrovascular malformation, Cirrhosis (11/24/2020), Colon polyps, Coronary artery disease, Diabetes mellitus, type 2, Diabetic peripheral neuropathy, GERD (gastroesophageal reflux disease), Herpes infection, Hyperlipidemia, Hypertension, Hypothyroidism, Kidney stones, Mild nonproliferative diabetic retinopathy of 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 edema associated with type 2 diabetes mellitus (4/18/2019), Nausea & vomiting (11/23/2015), Obesity, morbid, Ovarian cyst, Seizure disorder, focal motor, Sleep apnea, Type II or unspecified type diabetes mellitus with neurological manifestations, uncontrolled(250.62), and Urticaria.  This patient has documented high risk feet requiring routine maintenance secondary to diabetes mellitis and those secondary complications of diabetes, as mentioned.  History of left big toe total nail P&A matrixectomy. Complains of aching pain to the tip of the both great toes that is not relieved by wearing open toed shoes. Denies trauma.  Also complains of hypersensitivity and diffuse pain underneath the ball of both feet.  She does get pins and needles sensation and both feet while at rest.    12/11/2020:  Returns for annual diabetic foot check.  Relates that she was diagnosed with cirrhosis of the liver this year and was treated per hepatology.  She is experiencing a great deal of pain at the distal tips of both great toes with previous diagnosis of subungual exostosis.  She has a moderate amount of stiffness in both feet as well.    01/14/2022:  Returns for annual diabetic foot exam.  She still complains of mild numbness along the tops of both feet and pain at the ends of both toes left greater than right.  Mostly  "wear sandals to prevent irritation to the tips of the big toes.  History right knee surgery which she relates that she still has some recovering neurological deficit from.    PCP: Aislinn Magallon MD    Date Last Seen by PCP:  10/29/2021  Current shoe gear:  sandals    Hemoglobin A1C   Date Value Ref Range Status   07/23/2021 6.2 (H) 4.0 - 5.6 % Final     Comment:     ADA Screening Guidelines:  5.7-6.4%  Consistent with prediabetes  >or=6.5%  Consistent with diabetes    High levels of fetal hemoglobin interfere with the HbA1C  assay. Heterozygous hemoglobin variants (HbS, HgC, etc)do  not significantly interfere with this assay.   However, presence of multiple variants may affect accuracy.     02/08/2021 6.6 (H) 4.0 - 5.6 % Final     Comment:     ADA Screening Guidelines:  5.7-6.4%  Consistent with prediabetes  >or=6.5%  Consistent with diabetes    High levels of fetal hemoglobin interfere with the HbA1C  assay. Heterozygous hemoglobin variants (HbS, HgC, etc)do  not significantly interfere with this assay.   However, presence of multiple variants may affect accuracy.     02/08/2021 6.6 (H) 4.0 - 5.6 % Final     Comment:     ADA Screening Guidelines:  5.7-6.4%  Consistent with prediabetes  >or=6.5%  Consistent with diabetes    High levels of fetal hemoglobin interfere with the HbA1C  assay. Heterozygous hemoglobin variants (HbS, HgC, etc)do  not significantly interfere with this assay.   However, presence of multiple variants may affect accuracy.       Vitals:    01/14/22 1118   BP: (!) 150/67   Pulse: 73   Weight: 106.6 kg (235 lb)   Height: 5' 2" (1.575 m)   PainSc: 0-No pain      Past Medical History:   Diagnosis Date    Allergy     Angio-edema     Arthritis     Cataract     bilateral - not removed    Cerebrovascular malformation     Cirrhosis 11/24/2020    Colon polyps     Coronary artery disease     Diabetes mellitus, type 2     Diabetic peripheral neuropathy     GERD (gastroesophageal reflux " disease)     Herpes infection     Hyperlipidemia     Hypertension     Hypothyroidism     Kidney stones     Mild nonproliferative diabetic retinopathy of 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 edema associated with type 2 diabetes mellitus 4/18/2019    Nausea & vomiting 11/23/2015    Obesity, morbid     Ovarian cyst     Seizure disorder, focal motor     Sleep apnea     Type II or unspecified type diabetes mellitus with neurological manifestations, uncontrolled(250.62)     Urticaria        Past Surgical History:   Procedure Laterality Date    CARPAL TUNNEL RELEASE Right 2014    COLONOSCOPY      COLONOSCOPY N/A 9/13/2017    Procedure: COLONOSCOPY Golytely;  Surgeon: Gisela Wall MD;  Location: [facility];  Service: Endoscopy;  Laterality: N/A;    CYST REMOVAL      skin; multiples    ESOPHAGOGASTRODUODENOSCOPY Left 10/15/2021    Procedure: EGD (ESOPHAGOGASTRODUODENOSCOPY);  Surgeon: Luis Fernando Taveras MD;  Location: [facility] ([address] [place] [address]);  Service: Endoscopy;  Laterality: Left;  cirrhosis labwork am of procedure  last seizure 1973  C[facility] 10/12/[facility]    EXTRACORPOREAL SHOCK WAVE LITHOTRIPSY  2002    HYSTERECTOMY  1984    JOINT REPLACEMENT Right 03/06/2019    knee    KIDNEY STONE SURGERY      KNEE ARTHROPLASTY Right 3/6/2019    Procedure: ARTHROPLASTY, KNEE;  Surgeon: Pj Gamboa MD;  Location: [facility];  Service: Orthopedics;  Laterality: Right;  Depuy (Stephen notified 2/21, CC)    THYROIDECTOMY  1977         TONSILLECTOMY, ADENOIDECTOMY      TRIGGER FINGER RELEASE      TUBAL LIGATION         Family History   Problem Relation Age of Onset    Cancer Father     Arthritis Father     Gout Father     Allergies Son     Allergic rhinitis Son     Skin cancer Mother     Macular degeneration Mother     Dementia Mother     Hypertension Mother     No Known Problems Daughter     Diabetes Daughter     Hypertension Daughter     No Known Problems Daughter     Glaucoma Maternal Aunt          Great Maternal Aunt    Leukemia Maternal Uncle     Amblyopia Neg Hx     Cataracts Neg Hx     Retinal detachment Neg Hx     Strabismus Neg Hx     Stroke Neg Hx     Thyroid disease Neg Hx     Kidney disease Neg Hx     Angioedema Neg Hx     Asthma Neg Hx     Atopy Neg Hx     Eczema Neg Hx     Immunodeficiency Neg Hx     Rhinitis Neg Hx     Urticaria Neg Hx        Social History     Socioeconomic History    Marital status:    Occupational History    Occupation: retired     Employer: [employer]   Tobacco Use    Smoking status: Never Smoker    Smokeless tobacco: Never Used   Substance and Sexual Activity    Alcohol use: No     Alcohol/week: 0.0 standard drinks    Drug use: No    Sexual activity: Yes     Partners: Male     Social Determinants of Health     Financial Resource Strain: Low Risk     Difficulty of Paying Living Expenses: Not hard at all   Food Insecurity: No Food Insecurity    Worried About Running Out of Food in the Last Year: Never true    Ran Out of Food in the Last Year: Never true   Transportation Needs: No Transportation Needs    Lack of Transportation (Medical): No    Lack of Transportation (Non-Medical): No   Physical Activity: Inactive    Days of Exercise per Week: 0 days    Minutes of Exercise per Session: 0 min   Stress: No Stress Concern Present    Feeling of Stress : Only a little   Social Connections: Moderately Isolated    Frequency of Communication with Friends and Family: Once a week    Frequency of Social Gatherings with Friends and Family: Never    Attends [employer] Services: More than 4 times per year    Active Member of Clubs or Organizations: No    Attends Club or Organization Meetings: Never    Marital Status:    Housing Stability: Low Risk     Unable to Pay for Housing in the Last Year: No    Number of Places Lived in the Last Year: 1    Unstable Housing in the Last Year: No       Current Outpatient Medications    Medication Sig Dispense Refill    amLODIPine (NORVASC) 5 MG tablet TAKE 1 TABLET (5 MG TOTAL) BY MOUTH EVERY EVENING. 90 tablet 1    ascorbic acid, vitamin C, (VITAMIN C) 100 MG tablet Take 500 mg by mouth 2 (two) times daily.       aspirin (ECOTRIN) 81 MG EC tablet Take 81 mg by mouth once daily.      blood sugar diagnostic (TRUE METRIX GLUCOSE TEST STRIP) Strp TEST TWO TIMES DAILY 200 strip 6    blood-glucose meter Misc [employer] True Metrix Air meter 1 each 0    calcium carbonate-vitamin D3 600 mg(1,500mg) -100 unit Cap Take 1 tablet by mouth once daily.      chlorthalidone (HYGROTEN) 25 MG Tab TAKE 2 TABLETS EVERY  tablet 3    estradioL (ESTRING) 2 mg (7.5 mcg /24 hour) vaginal ring Place 2 mg vaginally every 3 (three) months. Insert vaginal ring. Replace with new ring every 3 months. 1 each 3    fluticasone propionate (FLONASE) 50 mcg/actuation nasal spray 2 sprays (100 mcg total) by Each Nostril route once daily. 48 g 4    gabapentin (NEURONTIN) 600 MG tablet TAKE 1 TABLET TWICE DAILY 180 tablet 1    glipiZIDE (GLUCOTROL) 5 MG tablet TAKE 1 TABLET TWICE DAILY BEFORE A MEAL 180 tablet 3    insulin degludec (TRESIBA FLEXTOUCH U-100) 100 unit/mL (3 mL) insulin pen Inject 25 Units into the skin once daily.      ketotifen (ZADITOR) 0.025 % (0.035 %) ophthalmic solution Place 1-2 drops into both eyes once daily.      L.acid-B.bifidum-B.animal-FOS 25 billion cell -100 mg Cap Take 1 capsule by mouth once daily.      lancets (TRUEPLUS LANCETS) 28 gauge [facility] Inject 1 lancet into the skin 2 (two) times daily before meals. 200 each 6    levothyroxine (SYNTHROID) 75 MCG tablet TAKE 1 TABLET EVERY DAY 90 tablet 0    loratadine (CLARITIN) 10 mg tablet Take 10 mg by mouth once daily.      lovastatin (MEVACOR) 40 MG tablet TAKE 1 TABLET NIGHTLY (SUBSTITUTED FOR MEVACOR) 90 tablet 1    magnesium oxide-Mg AA chelate (MG-PLUS-PROTEIN) 133 mg Tab Take by mouth as directed.      metFORMIN (GLUCOPHAGE) 1000  "MG tablet TAKE 1 TABLET TWICE DAILY WITH MEALS 180 tablet 1    mv-mn/folic acid/vit [id number] (ALIVE ONCE DAILY WOMEN 50 PLUS ORAL) Take 1 tablet by mouth once daily.      omeprazole (PRILOSEC) 20 MG capsule TAKE 1 CAPSULE EVERY DAY AS NEEDED 90 capsule 0    pen needle, diabetic (BD ULTRA-FINE EDUARDO PEN NEEDLE) 32 gauge x 5/32" Ndle USE AS DIRECTED 200 each 6    potassium chloride SA (K-DUR,KLOR-CON) 10 MEQ tablet TAKE 2 TABLETS EVERY  tablet 3    semaglutide (OZEMPIC) 0.25 mg or 0.5 mg(2 mg/1.5 mL) PnIj Inject 0.375 mLs (0.5 mg total) into the skin every 7 days. 3 Syringe 4    valACYclovir (VALTREX) 500 MG tablet TAKE 1 TABLET (500 MG TOTAL) BY MOUTH ONCE DAILY. 90 tablet 1    valsartan (DIOVAN) 160 MG tablet TAKE 1 TABLET TWICE DAILY 180 tablet 1    azelastine (ASTELIN) 137 mcg (0.1 %) nasal spray 2 sprays (274 mcg total) by Nasal route 2 (two) times daily. 120 mL 4    cranberry 500 mg Cap Take by mouth.      ENGERIX-B, PF, 20 mcg/mL Syrg        No current facility-administered medications for this visit.       Allergies   Allergen Reactions    Sulfa (Sulfonamide Antibiotics) Nausea Only    Ciprofloxacin     Januvia [Sitagliptin]      abd pain    Lisinopril     Lotensin [Benazepril]     Nexium [Esomeprazole Magnesium] Other (See Comments)     Gas         Review of Systems   Constitutional: Negative for chills, fever and malaise/fatigue.   HENT: Negative for congestion and hearing loss.    Cardiovascular: Negative for chest pain, claudication and leg swelling.   Respiratory: Negative for cough and shortness of breath.    Skin: Positive for nail changes. Negative for color change, dry skin and itching.   Musculoskeletal: Positive for back pain. Negative for joint pain, muscle cramps and muscle weakness.   Gastrointestinal: Negative for nausea and vomiting.   Neurological: Positive for numbness and paresthesias. Negative for weakness.   Psychiatric/Behavioral: Negative for altered mental status. "           Objective:      Physical Exam  Constitutional:       General: She is not in acute distress.     Appearance: She is not diaphoretic.   Cardiovascular:      Pulses:           Dorsalis pedis pulses are 2+ on the right side and 2+ on the left side.        Posterior tibial pulses are 2+ on the right side and 2+ on the left side.      Comments: CFT< 3 secs all toes bilateral foot, skin temp warm bilateral foot, no digital hair growth bilateral foot, mild nonpitting lower extremity edema bilateral with hemosiderin staining of the legs bilateral.  No rubor on dependency bilateral foot.      Musculoskeletal:      Right foot: No deformity.      Left foot: No deformity.      Comments: Gastrocnemius equinus bilateral. Rectus foot type with adductovarus rotation of fifth toe bilateral. No pain with ROM or MMT bilateral foot.    Palpable enlargement of the distal hallux bilateral with moderate localized pain, no discoloration or edema or warmth.     Negative Lachman test toes 2-5 bilateral foot.    Positive Tinel sign overlying the distal anterior lateral aspect to leg overlying the course of superficial peroneal nerve bilateral.   Feet:      Right foot:      Protective Sensation: 10 sites tested. 10 sites sensed.      Skin integrity: No ulcer, blister, skin breakdown, erythema, warmth, callus, dry skin or fissure.      Toenail Condition: Right toenails are normal.      Left foot:      Protective Sensation: 10 sites tested. 10 sites sensed.      Skin integrity: No ulcer, blister, skin breakdown, erythema, warmth, callus, dry skin or fissure.      Toenail Condition: Left toenails are normal.   Skin:     General: Skin is warm and dry.      Capillary Refill: Capillary refill takes less than 2 seconds.      Coloration: Skin is not pale.      Findings: No ecchymosis, erythema or rash.      Nails: There is no clubbing.      Comments: Absent toenail bilateral hallux.    Remaining nails 2-5 b/l normotrophic and trimmed.     No  open lesions or macerations bilateral lower extremity.           Neurological:      Mental Status: She is alert and oriented to person, place, and time.      Sensory: Sensation is intact. No sensory deficit.      Motor: Motor function is intact.      Comments: Vibratory sensation intact bilateral foot.                  Assessment:       Encounter Diagnoses   Name Primary?   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    Shoe inspection. Diabetic Foot Education. Patient reminded of the importance of good nutrition and blood sugar control to help prevent podiatric complications of diabetes. Patient instructed on proper foot hygeine. We discussed wearing proper shoe gear, daily foot inspections, never walking without protective shoe gear, never putting sharp instruments to feet, routine podiatric nail visits every 2-3 months. Discussed shoes with adequate toe box space to prevent excessive pressure and rubbing along the toenails.     Discussed wearing low-grade 15-20 mm Hg below-knee compression stockings to help with the edema to legs which may also help with some of the entrapment neuropathy of the superficial peroneal nerve bilateral.  Encouraged elevation at rest.    We  briefly discussed obtaining extra-depth diabetic shoes with heat molded insoles in order to help avoid any pressure to the distal tips of big toes.  Also discussed possible surgical intervention to the left hallux if conservative care fails in order to remove the large exostosis to decompress the toe.    RTC within 1 year p.r.n. as discussed.    Comprehensive annual diabetic foot exam completed today.  Patient is found to be low overall risk for diabetic foot complication.    A portion of this note was generated by voice recognition software and may contain topical graphical errors.

## 2022-01-18 PROCEDURE — 99454 REM MNTR PHYSIOL PARAM 16-30: CPT | Mod: S$GLB,,, | Performed by: INTERNAL MEDICINE

## 2022-01-18 PROCEDURE — 99454 PR REMOTE MNTR, PHYS PARAM, INITIAL, EA 30 DAYS: ICD-10-PCS | Mod: S$GLB,,, | Performed by: INTERNAL MEDICINE

## 2022-01-19 ENCOUNTER — CLINICAL SUPPORT (OUTPATIENT)
Dept: INTERNAL MEDICINE | Facility: CLINIC | Age: 72
End: 2022-01-19
Payer: MEDICARE

## 2022-01-19 DIAGNOSIS — J30.1 ALLERGIC RHINITIS DUE TO POLLEN, UNSPECIFIED SEASONALITY: Primary | ICD-10-CM

## 2022-01-19 DIAGNOSIS — J30.81 ALLERGY TO CATS: ICD-10-CM

## 2022-01-19 PROCEDURE — 99999 PR PBB SHADOW E&M-EST. PATIENT-LVL I: ICD-10-PCS | Mod: PBBFAC,HCNC,,

## 2022-01-19 PROCEDURE — 95115 PR IMMUNOTHERAPY, ONE INJECTION: ICD-10-PCS | Mod: HCNC,S$GLB,, | Performed by: FAMILY MEDICINE

## 2022-01-19 PROCEDURE — 95115 IMMUNOTHERAPY ONE INJECTION: CPT | Mod: HCNC,S$GLB,, | Performed by: FAMILY MEDICINE

## 2022-01-19 PROCEDURE — 99499 NO LOS: ICD-10-PCS | Mod: HCNC,S$GLB,, | Performed by: ALLERGY & IMMUNOLOGY

## 2022-01-19 PROCEDURE — 99499 UNLISTED E&M SERVICE: CPT | Mod: HCNC,S$GLB,, | Performed by: ALLERGY & IMMUNOLOGY

## 2022-01-19 PROCEDURE — 99999 PR PBB SHADOW E&M-EST. PATIENT-LVL I: CPT | Mod: PBBFAC,HCNC,,

## 2022-01-19 NOTE — PROGRESS NOTES
SQ-LT UPPER ARM  ALLERGY injection blue vial # 1/1 C/DM/TR/ 0.2 ml  given,tolerated well. Pt waited 30 minutes, no local reaction noted

## 2022-01-24 ENCOUNTER — CLINICAL SUPPORT (OUTPATIENT)
Dept: INTERNAL MEDICINE | Facility: CLINIC | Age: 72
End: 2022-01-24
Payer: MEDICARE

## 2022-01-24 DIAGNOSIS — J30.1 ALLERGIC RHINITIS DUE TO POLLEN, UNSPECIFIED SEASONALITY: Primary | ICD-10-CM

## 2022-01-24 DIAGNOSIS — J30.81 ALLERGY TO CATS: ICD-10-CM

## 2022-01-24 DIAGNOSIS — Z91.09 ALLERGY TO MITES: ICD-10-CM

## 2022-01-24 PROCEDURE — 99499 UNLISTED E&M SERVICE: CPT | Mod: HCNC,S$GLB,, | Performed by: ALLERGY & IMMUNOLOGY

## 2022-01-24 PROCEDURE — 95115 PR IMMUNOTHERAPY, ONE INJECTION: ICD-10-PCS | Mod: HCNC,S$GLB,, | Performed by: FAMILY MEDICINE

## 2022-01-24 PROCEDURE — 99499 NO LOS: ICD-10-PCS | Mod: HCNC,S$GLB,, | Performed by: ALLERGY & IMMUNOLOGY

## 2022-01-24 PROCEDURE — 99999 PR PBB SHADOW E&M-EST. PATIENT-LVL I: CPT | Mod: PBBFAC,HCNC,,

## 2022-01-24 PROCEDURE — 95115 IMMUNOTHERAPY ONE INJECTION: CPT | Mod: HCNC,S$GLB,, | Performed by: FAMILY MEDICINE

## 2022-01-24 PROCEDURE — 99999 PR PBB SHADOW E&M-EST. PATIENT-LVL I: ICD-10-PCS | Mod: PBBFAC,HCNC,,

## 2022-01-24 NOTE — PROGRESS NOTES
SQ-LT UPPER ARM  ALLERGY injection blue vial # 1/1 C/DM/TR/ 0.3 ml  given,tolerated well. Pt waited 30 minutes, no local reaction noted

## 2022-01-26 ENCOUNTER — CLINICAL SUPPORT (OUTPATIENT)
Dept: INTERNAL MEDICINE | Facility: CLINIC | Age: 72
End: 2022-01-26
Payer: MEDICARE

## 2022-01-26 DIAGNOSIS — Z91.09 ALLERGY TO MITES: ICD-10-CM

## 2022-01-26 DIAGNOSIS — J30.81 ALLERGY TO CATS: ICD-10-CM

## 2022-01-26 DIAGNOSIS — J30.1 ALLERGIC RHINITIS DUE TO POLLEN, UNSPECIFIED SEASONALITY: Primary | ICD-10-CM

## 2022-01-26 PROCEDURE — 95115 IMMUNOTHERAPY ONE INJECTION: CPT | Mod: HCNC,S$GLB,, | Performed by: FAMILY MEDICINE

## 2022-01-26 PROCEDURE — 99499 NO LOS: ICD-10-PCS | Mod: HCNC,S$GLB,, | Performed by: ALLERGY & IMMUNOLOGY

## 2022-01-26 PROCEDURE — 99499 UNLISTED E&M SERVICE: CPT | Mod: HCNC,S$GLB,, | Performed by: ALLERGY & IMMUNOLOGY

## 2022-01-26 PROCEDURE — 95115 PR IMMUNOTHERAPY, ONE INJECTION: ICD-10-PCS | Mod: HCNC,S$GLB,, | Performed by: FAMILY MEDICINE

## 2022-01-26 NOTE — PROGRESS NOTES
SQ-LT UPPER ARM  ALLERGY injection blue vial # 1/1 C/DM/TR/ 0.4 ml given,tolerated well. Pt waited 30 minutes, no local reaction noted

## 2022-01-28 ENCOUNTER — TELEPHONE (OUTPATIENT)
Dept: PODIATRY | Facility: CLINIC | Age: 72
End: 2022-01-28
Payer: MEDICARE

## 2022-01-28 ENCOUNTER — CLINICAL SUPPORT (OUTPATIENT)
Dept: INTERNAL MEDICINE | Facility: CLINIC | Age: 72
End: 2022-01-28
Payer: MEDICARE

## 2022-01-28 DIAGNOSIS — J30.9 CHRONIC ALLERGIC RHINITIS: ICD-10-CM

## 2022-01-28 PROCEDURE — 99499 UNLISTED E&M SERVICE: CPT | Mod: HCNC,S$GLB,, | Performed by: ALLERGY & IMMUNOLOGY

## 2022-01-28 PROCEDURE — 95115 PR IMMUNOTHERAPY, ONE INJECTION: ICD-10-PCS | Mod: HCNC,S$GLB,, | Performed by: FAMILY MEDICINE

## 2022-01-28 PROCEDURE — 99499 NO LOS: ICD-10-PCS | Mod: HCNC,S$GLB,, | Performed by: ALLERGY & IMMUNOLOGY

## 2022-01-28 PROCEDURE — 95115 IMMUNOTHERAPY ONE INJECTION: CPT | Mod: HCNC,S$GLB,, | Performed by: FAMILY MEDICINE

## 2022-01-31 ENCOUNTER — CLINICAL SUPPORT (OUTPATIENT)
Dept: INTERNAL MEDICINE | Facility: CLINIC | Age: 72
End: 2022-01-31
Payer: MEDICARE

## 2022-01-31 DIAGNOSIS — J30.1 ALLERGIC RHINITIS DUE TO POLLEN, UNSPECIFIED SEASONALITY: Primary | ICD-10-CM

## 2022-01-31 DIAGNOSIS — J30.81 ALLERGY TO CATS: ICD-10-CM

## 2022-01-31 DIAGNOSIS — Z91.09 ALLERGY TO MITES: ICD-10-CM

## 2022-01-31 PROCEDURE — 99499 UNLISTED E&M SERVICE: CPT | Mod: HCNC,S$GLB,, | Performed by: ALLERGY & IMMUNOLOGY

## 2022-01-31 PROCEDURE — 95115 PR IMMUNOTHERAPY, ONE INJECTION: ICD-10-PCS | Mod: HCNC,S$GLB,, | Performed by: FAMILY MEDICINE

## 2022-01-31 PROCEDURE — 95115 IMMUNOTHERAPY ONE INJECTION: CPT | Mod: HCNC,S$GLB,, | Performed by: FAMILY MEDICINE

## 2022-01-31 PROCEDURE — 99499 NO LOS: ICD-10-PCS | Mod: HCNC,S$GLB,, | Performed by: ALLERGY & IMMUNOLOGY

## 2022-01-31 NOTE — PROGRESS NOTES
SQ-LT UPPER ARM  ALLERGY injection yellow vial #1/1 C/DM/TR/ 0.1 ml   given,tolerated well. Pt waited 30 minutes, no local reaction noted

## 2022-02-02 ENCOUNTER — CLINICAL SUPPORT (OUTPATIENT)
Dept: INTERNAL MEDICINE | Facility: CLINIC | Age: 72
End: 2022-02-02
Payer: MEDICARE

## 2022-02-02 DIAGNOSIS — Z91.09 ALLERGY TO MITES: ICD-10-CM

## 2022-02-02 DIAGNOSIS — J30.81 ALLERGY TO CATS: ICD-10-CM

## 2022-02-02 DIAGNOSIS — J30.1 ALLERGIC RHINITIS DUE TO POLLEN, UNSPECIFIED SEASONALITY: Primary | ICD-10-CM

## 2022-02-02 PROCEDURE — 95115 IMMUNOTHERAPY ONE INJECTION: CPT | Mod: HCNC,S$GLB,, | Performed by: FAMILY MEDICINE

## 2022-02-02 PROCEDURE — 99499 NO LOS: ICD-10-PCS | Mod: HCNC,S$GLB,, | Performed by: ALLERGY & IMMUNOLOGY

## 2022-02-02 PROCEDURE — 99499 UNLISTED E&M SERVICE: CPT | Mod: HCNC,S$GLB,, | Performed by: ALLERGY & IMMUNOLOGY

## 2022-02-02 PROCEDURE — 95115 PR IMMUNOTHERAPY, ONE INJECTION: ICD-10-PCS | Mod: HCNC,S$GLB,, | Performed by: FAMILY MEDICINE

## 2022-02-02 NOTE — PROGRESS NOTES
SQ-LT UPPER ARM  ALLERGY injection yellow vial # 1/1 C/DM/TR/ 0.15 ml  given,tolerated well. Pt waited 30 minutes, no local reaction noted

## 2022-02-07 ENCOUNTER — LAB VISIT (OUTPATIENT)
Dept: LAB | Facility: HOSPITAL | Age: 72
End: 2022-02-07
Attending: INTERNAL MEDICINE
Payer: MEDICARE

## 2022-02-07 ENCOUNTER — CLINICAL SUPPORT (OUTPATIENT)
Dept: INTERNAL MEDICINE | Facility: CLINIC | Age: 72
End: 2022-02-07
Payer: MEDICARE

## 2022-02-07 DIAGNOSIS — J30.81 ALLERGY TO CATS: ICD-10-CM

## 2022-02-07 DIAGNOSIS — R79.89 ABNORMAL CBC MEASUREMENT: ICD-10-CM

## 2022-02-07 DIAGNOSIS — Z91.09 ALLERGY TO MITES: ICD-10-CM

## 2022-02-07 DIAGNOSIS — Z79.4 TYPE 2 DIABETES MELLITUS WITH HYPERGLYCEMIA, WITH LONG-TERM CURRENT USE OF INSULIN: ICD-10-CM

## 2022-02-07 DIAGNOSIS — J30.1 ALLERGIC RHINITIS DUE TO POLLEN, UNSPECIFIED SEASONALITY: Primary | ICD-10-CM

## 2022-02-07 DIAGNOSIS — E11.65 TYPE 2 DIABETES MELLITUS WITH HYPERGLYCEMIA, WITH LONG-TERM CURRENT USE OF INSULIN: ICD-10-CM

## 2022-02-07 LAB
ALBUMIN SERPL BCP-MCNC: 3.2 G/DL (ref 3.5–5.2)
ALP SERPL-CCNC: 78 U/L (ref 55–135)
ALT SERPL W/O P-5'-P-CCNC: 20 U/L (ref 10–44)
ANION GAP SERPL CALC-SCNC: 10 MMOL/L (ref 8–16)
AST SERPL-CCNC: 20 U/L (ref 10–40)
BASOPHILS # BLD AUTO: 0.04 K/UL (ref 0–0.2)
BASOPHILS NFR BLD: 0.4 % (ref 0–1.9)
BILIRUB SERPL-MCNC: 0.5 MG/DL (ref 0.1–1)
BUN SERPL-MCNC: 15 MG/DL (ref 8–23)
CALCIUM SERPL-MCNC: 9.9 MG/DL (ref 8.7–10.5)
CHLORIDE SERPL-SCNC: 103 MMOL/L (ref 95–110)
CHOLEST SERPL-MCNC: 143 MG/DL (ref 120–199)
CHOLEST/HDLC SERPL: 2.8 {RATIO} (ref 2–5)
CO2 SERPL-SCNC: 29 MMOL/L (ref 23–29)
CREAT SERPL-MCNC: 0.8 MG/DL (ref 0.5–1.4)
DIFFERENTIAL METHOD: ABNORMAL
EOSINOPHIL # BLD AUTO: 0.4 K/UL (ref 0–0.5)
EOSINOPHIL NFR BLD: 4.4 % (ref 0–8)
ERYTHROCYTE [DISTWIDTH] IN BLOOD BY AUTOMATED COUNT: 14.2 % (ref 11.5–14.5)
EST. GFR  (AFRICAN AMERICAN): >60 ML/MIN/1.73 M^2
EST. GFR  (NON AFRICAN AMERICAN): >60 ML/MIN/1.73 M^2
ESTIMATED AVG GLUCOSE: 143 MG/DL (ref 68–131)
GLUCOSE SERPL-MCNC: 114 MG/DL (ref 70–110)
HBA1C MFR BLD: 6.6 % (ref 4–5.6)
HCT VFR BLD AUTO: 41.3 % (ref 37–48.5)
HDLC SERPL-MCNC: 52 MG/DL (ref 40–75)
HDLC SERPL: 36.4 % (ref 20–50)
HGB BLD-MCNC: 13 G/DL (ref 12–16)
IMM GRANULOCYTES # BLD AUTO: 0.03 K/UL (ref 0–0.04)
IMM GRANULOCYTES NFR BLD AUTO: 0.3 % (ref 0–0.5)
LDLC SERPL CALC-MCNC: 67.2 MG/DL (ref 63–159)
LYMPHOCYTES # BLD AUTO: 2.7 K/UL (ref 1–4.8)
LYMPHOCYTES NFR BLD: 28.8 % (ref 18–48)
MCH RBC QN AUTO: 29.7 PG (ref 27–31)
MCHC RBC AUTO-ENTMCNC: 31.5 G/DL (ref 32–36)
MCV RBC AUTO: 95 FL (ref 82–98)
MONOCYTES # BLD AUTO: 0.9 K/UL (ref 0.3–1)
MONOCYTES NFR BLD: 9.7 % (ref 4–15)
NEUTROPHILS # BLD AUTO: 5.3 K/UL (ref 1.8–7.7)
NEUTROPHILS NFR BLD: 56.4 % (ref 38–73)
NONHDLC SERPL-MCNC: 91 MG/DL
NRBC BLD-RTO: 0 /100 WBC
PLATELET # BLD AUTO: 289 K/UL (ref 150–450)
PMV BLD AUTO: 10.4 FL (ref 9.2–12.9)
POTASSIUM SERPL-SCNC: 3.5 MMOL/L (ref 3.5–5.1)
PROT SERPL-MCNC: 7.4 G/DL (ref 6–8.4)
RBC # BLD AUTO: 4.37 M/UL (ref 4–5.4)
SODIUM SERPL-SCNC: 142 MMOL/L (ref 136–145)
TRIGL SERPL-MCNC: 119 MG/DL (ref 30–150)
WBC # BLD AUTO: 9.31 K/UL (ref 3.9–12.7)

## 2022-02-07 PROCEDURE — 83036 HEMOGLOBIN GLYCOSYLATED A1C: CPT | Mod: HCNC | Performed by: INTERNAL MEDICINE

## 2022-02-07 PROCEDURE — 95115 IMMUNOTHERAPY ONE INJECTION: CPT | Mod: HCNC,S$GLB,, | Performed by: FAMILY MEDICINE

## 2022-02-07 PROCEDURE — 85025 COMPLETE CBC W/AUTO DIFF WBC: CPT | Mod: HCNC | Performed by: INTERNAL MEDICINE

## 2022-02-07 PROCEDURE — 80061 LIPID PANEL: CPT | Mod: HCNC | Performed by: INTERNAL MEDICINE

## 2022-02-07 PROCEDURE — 95115 PR IMMUNOTHERAPY, ONE INJECTION: ICD-10-PCS | Mod: HCNC,S$GLB,, | Performed by: FAMILY MEDICINE

## 2022-02-07 PROCEDURE — 99499 NO LOS: ICD-10-PCS | Mod: HCNC,S$GLB,, | Performed by: ALLERGY & IMMUNOLOGY

## 2022-02-07 PROCEDURE — 80053 COMPREHEN METABOLIC PANEL: CPT | Mod: HCNC | Performed by: INTERNAL MEDICINE

## 2022-02-07 PROCEDURE — 99499 UNLISTED E&M SERVICE: CPT | Mod: HCNC,S$GLB,, | Performed by: ALLERGY & IMMUNOLOGY

## 2022-02-07 NOTE — PROGRESS NOTES
SQ-LT UPPER ARM  ALLERGY injection yellow vial # 1/1 C/DM/TR. 0.2 ml  given,tolerated well. Pt waited 30 minutes, no local reaction noted

## 2022-02-11 ENCOUNTER — CLINICAL SUPPORT (OUTPATIENT)
Dept: INTERNAL MEDICINE | Facility: CLINIC | Age: 72
End: 2022-02-11
Payer: MEDICARE

## 2022-02-11 ENCOUNTER — OFFICE VISIT (OUTPATIENT)
Dept: INTERNAL MEDICINE | Facility: CLINIC | Age: 72
End: 2022-02-11
Payer: MEDICARE

## 2022-02-11 VITALS
HEART RATE: 70 BPM | SYSTOLIC BLOOD PRESSURE: 104 MMHG | WEIGHT: 237.19 LBS | DIASTOLIC BLOOD PRESSURE: 58 MMHG | BODY MASS INDEX: 43.65 KG/M2 | OXYGEN SATURATION: 99 % | HEIGHT: 62 IN

## 2022-02-11 DIAGNOSIS — E11.42 DIABETIC POLYNEUROPATHY ASSOCIATED WITH TYPE 2 DIABETES MELLITUS: ICD-10-CM

## 2022-02-11 DIAGNOSIS — G47.33 OSA (OBSTRUCTIVE SLEEP APNEA): ICD-10-CM

## 2022-02-11 DIAGNOSIS — K74.60 HEPATIC CIRRHOSIS, UNSPECIFIED HEPATIC CIRRHOSIS TYPE, UNSPECIFIED WHETHER ASCITES PRESENT: ICD-10-CM

## 2022-02-11 DIAGNOSIS — E87.6 HYPOKALEMIA: ICD-10-CM

## 2022-02-11 DIAGNOSIS — M46.1 SACROILIITIS: ICD-10-CM

## 2022-02-11 DIAGNOSIS — J30.9 CHRONIC ALLERGIC RHINITIS: ICD-10-CM

## 2022-02-11 DIAGNOSIS — I85.10 SECONDARY ESOPHAGEAL VARICES WITHOUT BLEEDING: ICD-10-CM

## 2022-02-11 DIAGNOSIS — E66.01 MORBID OBESITY WITH BODY MASS INDEX (BMI) OF 40.0 TO 44.9 IN ADULT: ICD-10-CM

## 2022-02-11 DIAGNOSIS — E11.22 TYPE 2 DIABETES MELLITUS WITH STAGE 3A CHRONIC KIDNEY DISEASE, WITH LONG-TERM CURRENT USE OF INSULIN: ICD-10-CM

## 2022-02-11 DIAGNOSIS — B00.9 RECURRENT HSV (HERPES SIMPLEX VIRUS): ICD-10-CM

## 2022-02-11 DIAGNOSIS — N18.31 TYPE 2 DIABETES MELLITUS WITH STAGE 3A CHRONIC KIDNEY DISEASE, WITH LONG-TERM CURRENT USE OF INSULIN: ICD-10-CM

## 2022-02-11 DIAGNOSIS — I77.9 BILATERAL CAROTID ARTERY DISEASE, UNSPECIFIED TYPE: ICD-10-CM

## 2022-02-11 DIAGNOSIS — R79.89 ABNORMAL CBC: ICD-10-CM

## 2022-02-11 DIAGNOSIS — E11.3293 MILD NONPROLIFERATIVE DIABETIC RETINOPATHY OF BOTH EYES WITHOUT MACULAR EDEMA ASSOCIATED WITH TYPE 2 DIABETES MELLITUS: ICD-10-CM

## 2022-02-11 DIAGNOSIS — Z79.4 TYPE 2 DIABETES MELLITUS WITH STAGE 3A CHRONIC KIDNEY DISEASE, WITH LONG-TERM CURRENT USE OF INSULIN: ICD-10-CM

## 2022-02-11 PROCEDURE — 3072F LOW RISK FOR RETINOPATHY: CPT | Mod: HCNC,CPTII,S$GLB, | Performed by: INTERNAL MEDICINE

## 2022-02-11 PROCEDURE — 99214 PR OFFICE/OUTPT VISIT, EST, LEVL IV, 30-39 MIN: ICD-10-PCS | Mod: HCNC,S$GLB,, | Performed by: INTERNAL MEDICINE

## 2022-02-11 PROCEDURE — 99499 UNLISTED E&M SERVICE: CPT | Mod: HCNC,S$GLB,, | Performed by: ALLERGY & IMMUNOLOGY

## 2022-02-11 PROCEDURE — 95115 PR IMMUNOTHERAPY, ONE INJECTION: ICD-10-PCS | Mod: HCNC,S$GLB,, | Performed by: FAMILY MEDICINE

## 2022-02-11 PROCEDURE — 3008F BODY MASS INDEX DOCD: CPT | Mod: HCNC,CPTII,S$GLB, | Performed by: INTERNAL MEDICINE

## 2022-02-11 PROCEDURE — 3074F SYST BP LT 130 MM HG: CPT | Mod: HCNC,CPTII,S$GLB, | Performed by: INTERNAL MEDICINE

## 2022-02-11 PROCEDURE — 1159F MED LIST DOCD IN RCRD: CPT | Mod: HCNC,CPTII,S$GLB, | Performed by: INTERNAL MEDICINE

## 2022-02-11 PROCEDURE — 3288F FALL RISK ASSESSMENT DOCD: CPT | Mod: HCNC,CPTII,S$GLB, | Performed by: INTERNAL MEDICINE

## 2022-02-11 PROCEDURE — 99999 PR PBB SHADOW E&M-EST. PATIENT-LVL V: ICD-10-PCS | Mod: PBBFAC,HCNC,, | Performed by: INTERNAL MEDICINE

## 2022-02-11 PROCEDURE — 1157F ADVNC CARE PLAN IN RCRD: CPT | Mod: HCNC,CPTII,S$GLB, | Performed by: INTERNAL MEDICINE

## 2022-02-11 PROCEDURE — 3008F PR BODY MASS INDEX (BMI) DOCUMENTED: ICD-10-PCS | Mod: HCNC,CPTII,S$GLB, | Performed by: INTERNAL MEDICINE

## 2022-02-11 PROCEDURE — 99214 OFFICE O/P EST MOD 30 MIN: CPT | Mod: HCNC,S$GLB,, | Performed by: INTERNAL MEDICINE

## 2022-02-11 PROCEDURE — 3074F PR MOST RECENT SYSTOLIC BLOOD PRESSURE < 130 MM HG: ICD-10-PCS | Mod: HCNC,CPTII,S$GLB, | Performed by: INTERNAL MEDICINE

## 2022-02-11 PROCEDURE — 99499 UNLISTED E&M SERVICE: CPT | Mod: S$GLB,,, | Performed by: INTERNAL MEDICINE

## 2022-02-11 PROCEDURE — 3078F PR MOST RECENT DIASTOLIC BLOOD PRESSURE < 80 MM HG: ICD-10-PCS | Mod: HCNC,CPTII,S$GLB, | Performed by: INTERNAL MEDICINE

## 2022-02-11 PROCEDURE — 1101F PR PT FALLS ASSESS DOC 0-1 FALLS W/OUT INJ PAST YR: ICD-10-PCS | Mod: HCNC,CPTII,S$GLB, | Performed by: INTERNAL MEDICINE

## 2022-02-11 PROCEDURE — 3078F DIAST BP <80 MM HG: CPT | Mod: HCNC,CPTII,S$GLB, | Performed by: INTERNAL MEDICINE

## 2022-02-11 PROCEDURE — 1101F PT FALLS ASSESS-DOCD LE1/YR: CPT | Mod: HCNC,CPTII,S$GLB, | Performed by: INTERNAL MEDICINE

## 2022-02-11 PROCEDURE — 99999 PR PBB SHADOW E&M-EST. PATIENT-LVL V: CPT | Mod: PBBFAC,HCNC,, | Performed by: INTERNAL MEDICINE

## 2022-02-11 PROCEDURE — 3044F HG A1C LEVEL LT 7.0%: CPT | Mod: HCNC,CPTII,S$GLB, | Performed by: INTERNAL MEDICINE

## 2022-02-11 PROCEDURE — 1159F PR MEDICATION LIST DOCUMENTED IN MEDICAL RECORD: ICD-10-PCS | Mod: HCNC,CPTII,S$GLB, | Performed by: INTERNAL MEDICINE

## 2022-02-11 PROCEDURE — 95115 IMMUNOTHERAPY ONE INJECTION: CPT | Mod: HCNC,S$GLB,, | Performed by: FAMILY MEDICINE

## 2022-02-11 PROCEDURE — 3044F PR MOST RECENT HEMOGLOBIN A1C LEVEL <7.0%: ICD-10-PCS | Mod: HCNC,CPTII,S$GLB, | Performed by: INTERNAL MEDICINE

## 2022-02-11 PROCEDURE — 3072F PR LOW RISK FOR RETINOPATHY: ICD-10-PCS | Mod: HCNC,CPTII,S$GLB, | Performed by: INTERNAL MEDICINE

## 2022-02-11 PROCEDURE — 99499 NO LOS: ICD-10-PCS | Mod: HCNC,S$GLB,, | Performed by: ALLERGY & IMMUNOLOGY

## 2022-02-11 PROCEDURE — 1125F AMNT PAIN NOTED PAIN PRSNT: CPT | Mod: HCNC,CPTII,S$GLB, | Performed by: INTERNAL MEDICINE

## 2022-02-11 PROCEDURE — 3288F PR FALLS RISK ASSESSMENT DOCUMENTED: ICD-10-PCS | Mod: HCNC,CPTII,S$GLB, | Performed by: INTERNAL MEDICINE

## 2022-02-11 PROCEDURE — 1157F PR ADVANCE CARE PLAN OR EQUIV PRESENT IN MEDICAL RECORD: ICD-10-PCS | Mod: HCNC,CPTII,S$GLB, | Performed by: INTERNAL MEDICINE

## 2022-02-11 PROCEDURE — 1125F PR PAIN SEVERITY QUANTIFIED, PAIN PRESENT: ICD-10-PCS | Mod: HCNC,CPTII,S$GLB, | Performed by: INTERNAL MEDICINE

## 2022-02-11 PROCEDURE — 99499 RISK ADDL DX/OHS AUDIT: ICD-10-PCS | Mod: S$GLB,,, | Performed by: INTERNAL MEDICINE

## 2022-02-11 RX ORDER — POTASSIUM CHLORIDE 750 MG/1
TABLET, EXTENDED RELEASE ORAL
Qty: 270 TABLET | Refills: 3 | Status: SHIPPED | OUTPATIENT
Start: 2022-02-11 | End: 2022-08-19

## 2022-02-11 NOTE — PROGRESS NOTES
Patient ID: Vivienne Jose is a 71 y.o. female.    Chief Complaint: Follow-up, Diabetes, and Hypertension     HPI  Vivienne is a 71 y.o. female with  liver cirrhosis, hypertension, type 2 diabetes, coronary artery disease, hypothyroidism, sleep apnea and recurrent urinary tract infection who presents for routine follow-up of medical conditions. She complains today of mild pain occurring in area of buttocks where she recently had recurrent herpes outbreak. She is now back on valtrex daily for suppression and the rash has resolved and pain has improved, but is still present. Pain does not radiate.   No further acute complaints or concerns. Reviewed labs from 2/7/22 with her today    Review of Systems   Skin:        See HPI   All other systems reviewed and are negative.     Objective:     Vitals:    02/11/22 1034   BP: (!) 104/58   Pulse: 70        Physical Exam  Vitals reviewed.   Constitutional:       General: She is not in acute distress.     Appearance: Normal appearance. She is well-developed. She is not ill-appearing, toxic-appearing or diaphoretic.   HENT:      Head: Normocephalic and atraumatic.      Right Ear: External ear normal.      Left Ear: External ear normal.      Nose: Nose normal.   Eyes:      General: No scleral icterus.        Right eye: No discharge.         Left eye: No discharge.      Extraocular Movements: Extraocular movements intact.      Conjunctiva/sclera: Conjunctivae normal.   Cardiovascular:      Rate and Rhythm: Normal rate and regular rhythm.      Heart sounds: Normal heart sounds. No murmur heard.  No friction rub. No gallop.    Pulmonary:      Effort: Pulmonary effort is normal. No respiratory distress.      Breath sounds: Normal breath sounds. No stridor. No wheezing, rhonchi or rales.   Skin:     General: Skin is warm and dry.             Comments: Mild erythema of skin in area as highlighted on diagram. This is where she had recent herpes outbreak. This is area of current mild pain    Neurological:      General: No focal deficit present.      Mental Status: She is alert and oriented to person, place, and time. Mental status is at baseline.   Psychiatric:         Mood and Affect: Mood normal.         Behavior: Behavior normal.         Thought Content: Thought content normal.         Judgment: Judgment normal.         Assessment:       1. Type 2 diabetes mellitus with stage 3a chronic kidney disease, with long-term current use of insulin Well controlled   2. Morbid obesity with body mass index (BMI) of 40.0 to 44.9 in adult Chronic   3. Secondary esophageal varices without bleeding Chronic   4. Sacroiliitis Chronic   5. Bilateral carotid artery disease, unspecified type Chronic   6. Hypokalemia Well controlled   7. Abnormal CBC    8. Diabetic polyneuropathy associated with type 2 diabetes mellitus Chronic   9. Mild nonproliferative diabetic retinopathy of 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 edema associated with type 2 diabetes mellitus Chronic   10. Recurrent HSV (herpes simplex virus) Chronic   11. Hepatic cirrhosis, unspecified hepatic cirrhosis type, unspecified whether ascites present Chronic   12. BAM (obstructive sleep apnea) Well controlled       Plan:     Discussed that pain is most likely from recent episode of herpes. Discussed that we could increase gabapentin dose to help with pain, but she declines.  She will instead use topical agents such as lidocaine for pain relief.  She will alert me for any worsening pain.    Type 2 diabetes mellitus with stage 3a chronic kidney disease, with long-term current use of insulin  Comments:  Continue current medication  Orders:  -     Comprehensive Metabolic Panel; Future; Expected date: 08/11/2022  -     Hemoglobin A1C; Future; Expected date: 08/11/2022    Morbid obesity with body mass index (BMI) of 40.0 to 44.9 in adult    Secondary esophageal varices without bleeding    Sacroiliitis    Bilateral carotid artery disease, unspecified  type  Comments:  Continue aspirin and statin.    Hypokalemia  Comments:  continue potassium   Orders:  -     potassium chloride SA (K-DUR,KLOR-CON) 10 MEQ tablet; Take 3 tablets (30 meq) PO daily  Dispense: 270 tablet; Refill: 3    Abnormal CBC  -     CBC Auto Differential; Future; Expected date: 08/11/2022    Diabetic polyneuropathy associated with type 2 diabetes mellitus    Mild nonproliferative diabetic retinopathy of 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 edema associated with type 2 diabetes mellitus    Recurrent HSV (herpes simplex virus)  Comments:  Continue daily Valtrex for suppression.    Hepatic cirrhosis, unspecified hepatic cirrhosis type, unspecified whether ascites present  Comments:  Management per hepatology.    BAM (obstructive sleep apnea)  Comments:  Compliant with CPAP.        RTC 6 months     Warning signs discussed, patient to call with any further issues or worsening of symptoms.       Parts of the above note were dictated using a voice dictation software. Please excuse any grammatical or typographical errors.

## 2022-02-13 ENCOUNTER — PATIENT MESSAGE (OUTPATIENT)
Dept: INTERNAL MEDICINE | Facility: CLINIC | Age: 72
End: 2022-02-13
Payer: MEDICARE

## 2022-02-13 DIAGNOSIS — N18.31 TYPE 2 DIABETES MELLITUS WITH STAGE 3A CHRONIC KIDNEY DISEASE, WITH LONG-TERM CURRENT USE OF INSULIN: Primary | ICD-10-CM

## 2022-02-13 DIAGNOSIS — Z79.4 TYPE 2 DIABETES MELLITUS WITH STAGE 3A CHRONIC KIDNEY DISEASE, WITH LONG-TERM CURRENT USE OF INSULIN: Primary | ICD-10-CM

## 2022-02-13 DIAGNOSIS — E11.22 TYPE 2 DIABETES MELLITUS WITH STAGE 3A CHRONIC KIDNEY DISEASE, WITH LONG-TERM CURRENT USE OF INSULIN: Primary | ICD-10-CM

## 2022-02-14 ENCOUNTER — CLINICAL SUPPORT (OUTPATIENT)
Dept: INTERNAL MEDICINE | Facility: CLINIC | Age: 72
End: 2022-02-14
Payer: MEDICARE

## 2022-02-14 DIAGNOSIS — J30.1 ALLERGIC RHINITIS DUE TO POLLEN, UNSPECIFIED SEASONALITY: Primary | ICD-10-CM

## 2022-02-14 DIAGNOSIS — J30.81 ALLERGY TO CATS: ICD-10-CM

## 2022-02-14 DIAGNOSIS — Z91.09 ALLERGY TO MITES: ICD-10-CM

## 2022-02-14 PROCEDURE — 99499 NO LOS: ICD-10-PCS | Mod: HCNC,S$GLB,, | Performed by: ALLERGY & IMMUNOLOGY

## 2022-02-14 PROCEDURE — 99499 UNLISTED E&M SERVICE: CPT | Mod: HCNC,S$GLB,, | Performed by: ALLERGY & IMMUNOLOGY

## 2022-02-14 PROCEDURE — 95115 IMMUNOTHERAPY ONE INJECTION: CPT | Mod: HCNC,S$GLB,, | Performed by: FAMILY MEDICINE

## 2022-02-14 PROCEDURE — 95115 PR IMMUNOTHERAPY, ONE INJECTION: ICD-10-PCS | Mod: HCNC,S$GLB,, | Performed by: FAMILY MEDICINE

## 2022-02-14 RX ORDER — INSULIN DEGLUDEC 100 U/ML
25 INJECTION, SOLUTION SUBCUTANEOUS DAILY
OUTPATIENT
Start: 2022-02-14

## 2022-02-14 RX ORDER — INSULIN DEGLUDEC 100 U/ML
INJECTION, SOLUTION SUBCUTANEOUS
Qty: 10 PEN | Refills: 0 | Status: SHIPPED | OUTPATIENT
Start: 2022-02-14 | End: 2022-06-14 | Stop reason: SDUPTHER

## 2022-02-14 NOTE — TELEPHONE ENCOUNTER
No new care gaps identified.  Powered by [employer]. Reference number: [id number].   2/14/2022 8:41:17 AM CST

## 2022-02-14 NOTE — PROGRESS NOTES
SQ-LT UPPER ARM  ALLERGY injection yellow vial # 1/1 C/DM/TR/ 0.3 ml  given,tolerated well. Pt waited 30 minutes, no local reaction noted

## 2022-02-14 NOTE — TELEPHONE ENCOUNTER
Spoke with patient, states you told her she didn't have to see you anymore so she has been getting refills from her pcp.

## 2022-02-17 PROCEDURE — 99454 PR REMOTE MNTR, PHYS PARAM, INITIAL, EA 30 DAYS: ICD-10-PCS | Mod: S$GLB,,, | Performed by: INTERNAL MEDICINE

## 2022-02-17 PROCEDURE — 99454 REM MNTR PHYSIOL PARAM 16-30: CPT | Mod: S$GLB,,, | Performed by: INTERNAL MEDICINE

## 2022-02-18 ENCOUNTER — CLINICAL SUPPORT (OUTPATIENT)
Dept: INTERNAL MEDICINE | Facility: CLINIC | Age: 72
End: 2022-02-18
Payer: MEDICARE

## 2022-02-18 DIAGNOSIS — J30.9 CHRONIC ALLERGIC RHINITIS: ICD-10-CM

## 2022-02-18 PROCEDURE — 95115 IMMUNOTHERAPY ONE INJECTION: CPT | Mod: HCNC,S$GLB,, | Performed by: FAMILY MEDICINE

## 2022-02-18 PROCEDURE — 99499 NO LOS: ICD-10-PCS | Mod: HCNC,S$GLB,, | Performed by: ALLERGY & IMMUNOLOGY

## 2022-02-18 PROCEDURE — 99499 UNLISTED E&M SERVICE: CPT | Mod: HCNC,S$GLB,, | Performed by: ALLERGY & IMMUNOLOGY

## 2022-02-18 PROCEDURE — 95115 PR IMMUNOTHERAPY, ONE INJECTION: ICD-10-PCS | Mod: HCNC,S$GLB,, | Performed by: FAMILY MEDICINE

## 2022-02-21 ENCOUNTER — CLINICAL SUPPORT (OUTPATIENT)
Dept: INTERNAL MEDICINE | Facility: CLINIC | Age: 72
End: 2022-02-21
Payer: MEDICARE

## 2022-02-21 DIAGNOSIS — J30.81 ALLERGY TO CATS: ICD-10-CM

## 2022-02-21 DIAGNOSIS — J30.1 ALLERGIC RHINITIS DUE TO POLLEN, UNSPECIFIED SEASONALITY: Primary | ICD-10-CM

## 2022-02-21 DIAGNOSIS — Z91.09 ALLERGY TO MITES: ICD-10-CM

## 2022-02-21 PROCEDURE — 99499 UNLISTED E&M SERVICE: CPT | Mod: HCNC,S$GLB,, | Performed by: ALLERGY & IMMUNOLOGY

## 2022-02-21 PROCEDURE — 99499 NO LOS: ICD-10-PCS | Mod: HCNC,S$GLB,, | Performed by: ALLERGY & IMMUNOLOGY

## 2022-02-21 PROCEDURE — 95115 PR IMMUNOTHERAPY, ONE INJECTION: ICD-10-PCS | Mod: HCNC,S$GLB,, | Performed by: FAMILY MEDICINE

## 2022-02-21 PROCEDURE — 95115 IMMUNOTHERAPY ONE INJECTION: CPT | Mod: HCNC,S$GLB,, | Performed by: FAMILY MEDICINE

## 2022-02-21 NOTE — PROGRESS NOTES
SQ-LT UPPER ARM  ALLERGY injection  Yellow vial # 1/1 C/DM/TR/ 0.4 ml  given,tolerated well. Pt waited 30 minutes, no local reaction noted

## 2022-02-23 ENCOUNTER — CLINICAL SUPPORT (OUTPATIENT)
Dept: INTERNAL MEDICINE | Facility: CLINIC | Age: 72
End: 2022-02-23
Payer: MEDICARE

## 2022-02-23 ENCOUNTER — OFFICE VISIT (OUTPATIENT)
Dept: HEPATOLOGY | Facility: CLINIC | Age: 72
End: 2022-02-23
Payer: MEDICARE

## 2022-02-23 VITALS
TEMPERATURE: 98 F | DIASTOLIC BLOOD PRESSURE: 65 MMHG | HEIGHT: 62 IN | SYSTOLIC BLOOD PRESSURE: 143 MMHG | BODY MASS INDEX: 44.01 KG/M2 | OXYGEN SATURATION: 98 % | WEIGHT: 239.19 LBS | HEART RATE: 68 BPM

## 2022-02-23 DIAGNOSIS — J30.81 ALLERGY TO CATS: ICD-10-CM

## 2022-02-23 DIAGNOSIS — I85.00 ESOPHAGEAL VARICES WITHOUT BLEEDING, UNSPECIFIED ESOPHAGEAL VARICES TYPE: Primary | ICD-10-CM

## 2022-02-23 DIAGNOSIS — J30.1 ALLERGIC RHINITIS DUE TO POLLEN, UNSPECIFIED SEASONALITY: Primary | ICD-10-CM

## 2022-02-23 DIAGNOSIS — K74.60 HEPATIC CIRRHOSIS, UNSPECIFIED HEPATIC CIRRHOSIS TYPE, UNSPECIFIED WHETHER ASCITES PRESENT: ICD-10-CM

## 2022-02-23 DIAGNOSIS — Z91.09 ALLERGY TO MITES: ICD-10-CM

## 2022-02-23 PROCEDURE — 3078F PR MOST RECENT DIASTOLIC BLOOD PRESSURE < 80 MM HG: ICD-10-PCS | Mod: HCNC,CPTII,S$GLB, | Performed by: INTERNAL MEDICINE

## 2022-02-23 PROCEDURE — 3044F HG A1C LEVEL LT 7.0%: CPT | Mod: HCNC,CPTII,S$GLB, | Performed by: INTERNAL MEDICINE

## 2022-02-23 PROCEDURE — 3288F FALL RISK ASSESSMENT DOCD: CPT | Mod: HCNC,CPTII,S$GLB, | Performed by: INTERNAL MEDICINE

## 2022-02-23 PROCEDURE — 99214 PR OFFICE/OUTPT VISIT, EST, LEVL IV, 30-39 MIN: ICD-10-PCS | Mod: HCNC,S$GLB,, | Performed by: INTERNAL MEDICINE

## 2022-02-23 PROCEDURE — 1160F PR REVIEW ALL MEDS BY PRESCRIBER/CLIN PHARMACIST DOCUMENTED: ICD-10-PCS | Mod: HCNC,CPTII,S$GLB, | Performed by: INTERNAL MEDICINE

## 2022-02-23 PROCEDURE — 3288F PR FALLS RISK ASSESSMENT DOCUMENTED: ICD-10-PCS | Mod: HCNC,CPTII,S$GLB, | Performed by: INTERNAL MEDICINE

## 2022-02-23 PROCEDURE — 99499 UNLISTED E&M SERVICE: CPT | Mod: HCNC,S$GLB,, | Performed by: ALLERGY & IMMUNOLOGY

## 2022-02-23 PROCEDURE — 1125F PR PAIN SEVERITY QUANTIFIED, PAIN PRESENT: ICD-10-PCS | Mod: HCNC,CPTII,S$GLB, | Performed by: INTERNAL MEDICINE

## 2022-02-23 PROCEDURE — 1159F PR MEDICATION LIST DOCUMENTED IN MEDICAL RECORD: ICD-10-PCS | Mod: HCNC,CPTII,S$GLB, | Performed by: INTERNAL MEDICINE

## 2022-02-23 PROCEDURE — 99999 PR PBB SHADOW E&M-EST. PATIENT-LVL IV: ICD-10-PCS | Mod: PBBFAC,HCNC,, | Performed by: INTERNAL MEDICINE

## 2022-02-23 PROCEDURE — 3077F SYST BP >= 140 MM HG: CPT | Mod: HCNC,CPTII,S$GLB, | Performed by: INTERNAL MEDICINE

## 2022-02-23 PROCEDURE — 99999 PR PBB SHADOW E&M-EST. PATIENT-LVL I: CPT | Mod: PBBFAC,HCNC,,

## 2022-02-23 PROCEDURE — 3077F PR MOST RECENT SYSTOLIC BLOOD PRESSURE >= 140 MM HG: ICD-10-PCS | Mod: HCNC,CPTII,S$GLB, | Performed by: INTERNAL MEDICINE

## 2022-02-23 PROCEDURE — 99999 PR PBB SHADOW E&M-EST. PATIENT-LVL IV: CPT | Mod: PBBFAC,HCNC,, | Performed by: INTERNAL MEDICINE

## 2022-02-23 PROCEDURE — 1101F PT FALLS ASSESS-DOCD LE1/YR: CPT | Mod: HCNC,CPTII,S$GLB, | Performed by: INTERNAL MEDICINE

## 2022-02-23 PROCEDURE — 1125F AMNT PAIN NOTED PAIN PRSNT: CPT | Mod: HCNC,CPTII,S$GLB, | Performed by: INTERNAL MEDICINE

## 2022-02-23 PROCEDURE — 1159F MED LIST DOCD IN RCRD: CPT | Mod: HCNC,CPTII,S$GLB, | Performed by: INTERNAL MEDICINE

## 2022-02-23 PROCEDURE — 1160F RVW MEDS BY RX/DR IN RCRD: CPT | Mod: HCNC,CPTII,S$GLB, | Performed by: INTERNAL MEDICINE

## 2022-02-23 PROCEDURE — 99214 OFFICE O/P EST MOD 30 MIN: CPT | Mod: HCNC,S$GLB,, | Performed by: INTERNAL MEDICINE

## 2022-02-23 PROCEDURE — 3078F DIAST BP <80 MM HG: CPT | Mod: HCNC,CPTII,S$GLB, | Performed by: INTERNAL MEDICINE

## 2022-02-23 PROCEDURE — 3008F BODY MASS INDEX DOCD: CPT | Mod: HCNC,CPTII,S$GLB, | Performed by: INTERNAL MEDICINE

## 2022-02-23 PROCEDURE — 95115 PR IMMUNOTHERAPY, ONE INJECTION: ICD-10-PCS | Mod: HCNC,S$GLB,, | Performed by: FAMILY MEDICINE

## 2022-02-23 PROCEDURE — 1157F ADVNC CARE PLAN IN RCRD: CPT | Mod: HCNC,CPTII,S$GLB, | Performed by: INTERNAL MEDICINE

## 2022-02-23 PROCEDURE — 3072F LOW RISK FOR RETINOPATHY: CPT | Mod: HCNC,CPTII,S$GLB, | Performed by: INTERNAL MEDICINE

## 2022-02-23 PROCEDURE — 99499 NO LOS: ICD-10-PCS | Mod: HCNC,S$GLB,, | Performed by: ALLERGY & IMMUNOLOGY

## 2022-02-23 PROCEDURE — 1101F PR PT FALLS ASSESS DOC 0-1 FALLS W/OUT INJ PAST YR: ICD-10-PCS | Mod: HCNC,CPTII,S$GLB, | Performed by: INTERNAL MEDICINE

## 2022-02-23 PROCEDURE — 1157F PR ADVANCE CARE PLAN OR EQUIV PRESENT IN MEDICAL RECORD: ICD-10-PCS | Mod: HCNC,CPTII,S$GLB, | Performed by: INTERNAL MEDICINE

## 2022-02-23 PROCEDURE — 3008F PR BODY MASS INDEX (BMI) DOCUMENTED: ICD-10-PCS | Mod: HCNC,CPTII,S$GLB, | Performed by: INTERNAL MEDICINE

## 2022-02-23 PROCEDURE — 95115 IMMUNOTHERAPY ONE INJECTION: CPT | Mod: HCNC,S$GLB,, | Performed by: FAMILY MEDICINE

## 2022-02-23 PROCEDURE — 3072F PR LOW RISK FOR RETINOPATHY: ICD-10-PCS | Mod: HCNC,CPTII,S$GLB, | Performed by: INTERNAL MEDICINE

## 2022-02-23 PROCEDURE — 3044F PR MOST RECENT HEMOGLOBIN A1C LEVEL <7.0%: ICD-10-PCS | Mod: HCNC,CPTII,S$GLB, | Performed by: INTERNAL MEDICINE

## 2022-02-23 PROCEDURE — 99999 PR PBB SHADOW E&M-EST. PATIENT-LVL I: ICD-10-PCS | Mod: PBBFAC,HCNC,,

## 2022-02-23 NOTE — PROGRESS NOTES
HEPATOLOGY FOLLOW UP    Referring Physician: iAslinn Magallon MD  Current Corresponding Physician: Aislinn Magallon MD    Vivienne Jose is here for follow up of compensated cirrhosis.    HPI  She is a 71 y.o. female with a PMHx of DM, HTN, CAD, hyperlipidemia, recurrent UTIs and obesity who had a renal stone CT and abdominal US (both 11/20) and was found to have a nodular liver c/w cirrhosis but no HCC. The patient does not drink alcohol heavily; there is no family history of chronic liver disease. HCV AB was negative in 2017. The patient denied any symptoms of decompensated cirrhosis, including no ascites or edema, cognitive problems that would suggest hepatic encephalopathy, or GI bleeding from varices.    Interval HIstory  Since Vivienne Jose's last few visits:    Serologic w/u for CLD:  HCV Ab-, HBsAg-, HBcAb-, HBsAb-, HAV IgG+  CHRISTINA+ (1:1280), ASMA-,   Ferritin 38, iron sat 15%  Peth negative  Alpha 1 antitrypsin level 123    Abdomen US 11/20: Morphologic changes of liver compatible with cirrhosis without definite focal hepatic abnormal echogenicity to suggest focal lesion  Abdo 12/21: no liver lesion    EGD: 10/21: no EV; repeat in 2024    Continues to deny any symptoms of decompensated cirrhosis, including no cognitive problems that would suggest hepatic encephalopathy, or GI bleeding from varices. She completed the vaccination series for HBV. She does intermittently have some mild lower extremity edema. She is fairly good about a low salt diet.    MELD-Na score: 6 at 2/7/2022  7:15 AM  MELD score: 6 at 2/7/2022  7:15 AM  Calculated from:  Serum Creatinine: 0.8 mg/dL (Using min of 1 mg/dL) at 2/7/2022  7:15 AM  Serum Sodium: 142 mmol/L (Using max of 137 mmol/L) at 2/7/2022  7:15 AM  Total Bilirubin: 0.5 mg/dL (Using min of 1 mg/dL) at 2/7/2022  7:15 AM  INR(ratio): 1.0 at 2/7/2022  7:15 AM  Age: 71 years    Outpatient Encounter Medications as of 2/23/2022   Medication Sig Dispense Refill    amLODIPine  (NORVASC) 5 MG tablet TAKE 1 TABLET (5 MG TOTAL) BY MOUTH EVERY EVENING. 90 tablet 1    ascorbic acid, vitamin C, (VITAMIN C) 100 MG tablet Take 500 mg by mouth 2 (two) times daily.       aspirin (ECOTRIN) 81 MG EC tablet Take 81 mg by mouth once daily.      azelastine (ASTELIN) 137 mcg (0.1 %) nasal spray 2 sprays (274 mcg total) by Nasal route 2 (two) times daily. 120 mL 4    blood sugar diagnostic (TRUE METRIX GLUCOSE TEST STRIP) Strp TEST TWO TIMES DAILY 200 strip 6    blood-glucose meter Misc Human True Metrix Air meter 1 each 0    calcium carbonate-vitamin D3 600 mg(1,500mg) -100 unit Cap Take 1 tablet by mouth once daily.      chlorthalidone (HYGROTEN) 25 MG Tab TAKE 2 TABLETS EVERY  tablet 3    estradioL (ESTRING) 2 mg (7.5 mcg /24 hour) vaginal ring Place 2 mg vaginally every 3 (three) months. Insert vaginal ring. Replace with new ring every 3 months. 1 each 3    fluticasone propionate (FLONASE) 50 mcg/actuation nasal spray 2 sprays (100 mcg total) by Each Nostril route once daily. 48 g 4    gabapentin (NEURONTIN) 600 MG tablet TAKE 1 TABLET TWICE DAILY (Patient taking differently: 600 mg 3 (three) times daily.) 180 tablet 1    glipiZIDE (GLUCOTROL) 5 MG tablet TAKE 1 TABLET TWICE DAILY BEFORE A MEAL 180 tablet 3    ketotifen (ZADITOR) 0.025 % (0.035 %) ophthalmic solution Place 1-2 drops into both eyes once daily.      L.acid-B.bifidum-B.animal-FOS 25 billion cell -100 mg Cap Take 1 capsule by mouth once daily.      lancets (TRUEPLUS LANCETS) 28 gauge [facility] [place] 1 lancet into the skin 2 (two) times daily before meals. 200 each 6    levothyroxine (SYNTHROID) 75 MCG tablet TAKE 1 TABLET EVERY DAY 90 tablet 0    loratadine (CLARITIN) 10 mg tablet Take 10 mg by mouth once daily.      lovastatin (MEVACOR) 40 MG tablet TAKE 1 TABLET NIGHTLY (SUBSTITUTED FOR MEVACOR) 90 tablet 1    magnesium oxide-Mg AA chelate (MG-PLUS-PROTEIN) 133 mg Tab Take by mouth as directed.      metFORMIN  "(GLUCOPHAGE) 1000 MG tablet TAKE 1 TABLET TWICE DAILY WITH MEALS 180 tablet 1    mv-mn/folic acid/vit K/irct877 (ALIVE ONCE DAILY WOMEN 50 PLUS ORAL) Take 1 tablet by mouth once daily.      omeprazole (PRILOSEC) 20 MG capsule TAKE 1 CAPSULE EVERY DAY AS NEEDED 90 capsule 0    pen needle, diabetic (BD ULTRA-FINE EDUARDO PEN NEEDLE) 32 gauge x 5/32" Ndle USE AS DIRECTED 200 each 6    potassium chloride SA (K-DUR,KLOR-CON) 10 MEQ tablet Take 3 tablets (30 meq) PO daily 270 tablet 3    semaglutide (OZEMPIC) 0.25 mg or 0.5 mg(2 mg/1.5 mL) PnIj Inject 0.375 mLs (0.5 mg total) into the skin every 7 days. 3 Syringe 4    TRESIBA FLEXTOUCH U-100 100 unit/mL (3 mL) insulin pen INJECT 38 UNITS INTO THE SKIN EVERY EVENING. 10 pen 0    valACYclovir (VALTREX) 500 MG tablet TAKE 1 TABLET (500 MG TOTAL) BY MOUTH ONCE DAILY. 90 tablet 1    valsartan (DIOVAN) 160 MG tablet TAKE 1 TABLET TWICE DAILY 180 tablet 1     No facility-administered encounter medications on file as of 2/23/2022.     Review of patient's allergies indicates:   Allergen Reactions    Sulfa (sulfonamide antibiotics) Nausea Only    Ciprofloxacin     Januvia [sitagliptin]      abd pain    Lisinopril     Lotensin [benazepril]     Nexium [esomeprazole magnesium] Other (See Comments)     Gas     Past Medical History:   Diagnosis Date    Allergy     Angio-edema     Arthritis     Cataract     bilateral - not removed    Cerebrovascular malformation     Cirrhosis 11/24/2020    Colon polyps     Coronary artery disease     Diabetes mellitus, type 2     Diabetic peripheral neuropathy     GERD (gastroesophageal reflux disease)     Herpes infection     Hyperlipidemia     Hypertension     Hypothyroidism     Kidney stones     Mild nonproliferative diabetic retinopathy of 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 edema associated with type 2 diabetes mellitus 4/18/2019    Nausea & vomiting 11/23/2015    Obesity, morbid     Ovarian cyst     Seizure disorder, focal " motor     Sleep apnea     Type II or unspecified type diabetes mellitus with neurological manifestations, uncontrolled(250.62)     Urticaria        Review of Systems   Constitutional: Negative.    HENT: Negative.    Eyes: Negative.    Respiratory: Negative.    Cardiovascular: Negative.    Gastrointestinal: Negative.    Genitourinary: Negative.    Musculoskeletal: Negative.    Skin: Negative.    Neurological: Negative.    Psychiatric/Behavioral: Negative.      Vitals:    02/23/22 0851   BP: (!) 143/65   Pulse: 68   Temp: 98.3 °F (36.8 °C)       Physical Exam  Vitals reviewed.   Constitutional:       Appearance: She is well-developed.   HENT:      Head: Normocephalic and atraumatic.   Eyes:      General: No scleral icterus.     Conjunctiva/sclera: Conjunctivae normal.      Pupils: Pupils are equal, round, and reactive to light.   Neck:      Thyroid: No thyromegaly.   Cardiovascular:      Rate and Rhythm: Normal rate and regular rhythm.      Heart sounds: Normal heart sounds.   Pulmonary:      Effort: Pulmonary effort is normal.      Breath sounds: Normal breath sounds. No rales.   Abdominal:      General: Bowel sounds are normal. There is no distension.      Palpations: Abdomen is soft. There is no mass.      Tenderness: There is no abdominal tenderness.   Musculoskeletal:         General: Normal range of motion.      Cervical back: Normal range of motion and neck supple.   Skin:     General: Skin is warm and dry.      Findings: No rash.   Neurological:      Mental Status: She is alert and oriented to person, place, and time.         MELD-Na score: 6 at 2/7/2022  7:15 AM  MELD score: 6 at 2/7/2022  7:15 AM  Calculated from:  Serum Creatinine: 0.8 mg/dL (Using min of 1 mg/dL) at 2/7/2022  7:15 AM  Serum Sodium: 142 mmol/L (Using max of 137 mmol/L) at 2/7/2022  7:15 AM  Total Bilirubin: 0.5 mg/dL (Using min of 1 mg/dL) at 2/7/2022  7:15 AM  INR(ratio): 1.0 at 2/7/2022  7:15 AM  Age: 71 years    Lab Results    Component Value Date     (H) 02/07/2022    BUN 15 02/07/2022    CREATININE 0.8 02/07/2022    CALCIUM 9.9 02/07/2022     02/07/2022    K 3.5 02/07/2022     02/07/2022    PROT 7.4 02/07/2022    CO2 29 02/07/2022    ANIONGAP 10 02/07/2022    WBC 9.31 02/07/2022    RBC 4.37 02/07/2022    HGB 13.0 02/07/2022    HCT 41.3 02/07/2022    MCV 95 02/07/2022    MCH 29.7 02/07/2022    MCHC 31.5 (L) 02/07/2022     Lab Results   Component Value Date    RDW 14.2 02/07/2022     02/07/2022    MPV 10.4 02/07/2022    GRAN 5.3 02/07/2022    GRAN 56.4 02/07/2022    LYMPH 2.7 02/07/2022    LYMPH 28.8 02/07/2022    MONO 0.9 02/07/2022    MONO 9.7 02/07/2022    EOSINOPHIL 4.4 02/07/2022    BASOPHIL 0.4 02/07/2022    EOS 0.4 02/07/2022    BASO 0.04 02/07/2022    APTT 25.6 07/27/2011    GROUPTRH O POS 11/29/2007    CHOL 143 02/07/2022    TRIG 119 02/07/2022    HDL 52 02/07/2022    CHOLHDL 36.4 02/07/2022    TOTALCHOLEST 2.8 02/07/2022    ALBUMIN 3.2 (L) 02/07/2022    BILIDIR 0.3 06/15/2013    AST 20 02/07/2022    ALT 20 02/07/2022    ALKPHOS 78 02/07/2022    MG 2.0 07/27/2011    LABPROT 10.9 02/07/2022    INR 1.0 02/07/2022       Assessment and Plan:  Patient Active Problem List   Diagnosis    Trigger middle finger of right hand    Kidney stones    Essential hypertension    CAD (coronary artery disease)    Chronic allergic rhinitis    Nuclear sclerosis - Both Eyes    Carpal tunnel syndrome of right wrist    Proteinuria    BAM (obstructive sleep apnea)    Sacroiliitis    Facet arthritis of lumbar region    Physical deconditioning    Osteopenia    Vitamin D deficiency    Hypocalcemia    Morbid obesity with body mass index (BMI) of 40.0 to 44.9 in adult    Recurrent HSV (herpes simplex virus)    Chronic pain of right knee    Gastroesophageal reflux disease    Hyperlipidemia    Nephrolithiasis    Hypokalemia    Diabetic polyneuropathy associated with type 2 diabetes mellitus    Diverticulosis of  intestine without bleeding    Skin nodule    Facet arthritis of cervical region    Bilateral carotid artery disease    Type 2 diabetes mellitus with stage 3a chronic kidney disease, with long-term current use of insulin    Interstitial cystitis    S/P total knee arthroplasty, right    Decreased range of motion of right knee    Muscle weakness of lower extremity    Mild nonproliferative diabetic retinopathy of 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 edema associated with type 2 diabetes mellitus    Hypothyroidism    Thyroid nodule    Cirrhosis    Varices, esophageal     Vivienne Jose is a 70 y.o. female with compensated cirrhosis. Current recommendations:  1. Cirrhosis, compensated: labs and every 6 months; US every 6 months to screen for HCC (next 06/22); EGD to screen for varices-repeat in 2024;  2. Recurrent UTIs: monitor  3. Edema: monitor; eat <2000 mg salt in diet; if worsens may need diuretics    Return 6 months or sooner if edema worsens.

## 2022-02-23 NOTE — PROGRESS NOTES
SQ-LT UPPER ARM  ALLERGY injection yellow vial # 1/1 C/DM/TR/ 0.45 ml  given,tolerated well. Pt waited 30 minutes, no local reaction noted

## 2022-02-25 ENCOUNTER — CLINICAL SUPPORT (OUTPATIENT)
Dept: INTERNAL MEDICINE | Facility: CLINIC | Age: 72
End: 2022-02-25
Payer: MEDICARE

## 2022-02-25 DIAGNOSIS — J30.81 ALLERGY TO CATS: ICD-10-CM

## 2022-02-25 DIAGNOSIS — Z91.09 ALLERGY TO MITES: ICD-10-CM

## 2022-02-25 DIAGNOSIS — J30.1 ALLERGIC RHINITIS DUE TO POLLEN, UNSPECIFIED SEASONALITY: Primary | ICD-10-CM

## 2022-02-25 PROCEDURE — 99499 UNLISTED E&M SERVICE: CPT | Mod: HCNC,S$GLB,, | Performed by: ALLERGY & IMMUNOLOGY

## 2022-02-25 PROCEDURE — 99499 NO LOS: ICD-10-PCS | Mod: HCNC,S$GLB,, | Performed by: ALLERGY & IMMUNOLOGY

## 2022-02-25 PROCEDURE — 95115 PR IMMUNOTHERAPY, ONE INJECTION: ICD-10-PCS | Mod: HCNC,S$GLB,, | Performed by: FAMILY MEDICINE

## 2022-02-25 PROCEDURE — 95115 IMMUNOTHERAPY ONE INJECTION: CPT | Mod: HCNC,S$GLB,, | Performed by: FAMILY MEDICINE

## 2022-02-25 NOTE — PROGRESS NOTES
SQ-LT UPPER ARM  ALLERGY injection yellow vial # 1/1 C/DM/TR 0.5 ml  given,tolerated well. Pt waited 30 minutes, no local reaction noted

## 2022-03-04 ENCOUNTER — CLINICAL SUPPORT (OUTPATIENT)
Dept: INTERNAL MEDICINE | Facility: CLINIC | Age: 72
End: 2022-03-04
Payer: MEDICARE

## 2022-03-04 DIAGNOSIS — J30.1 ALLERGIC RHINITIS DUE TO POLLEN, UNSPECIFIED SEASONALITY: ICD-10-CM

## 2022-03-04 PROCEDURE — 95115 PR IMMUNOTHERAPY, ONE INJECTION: ICD-10-PCS | Mod: HCNC,S$GLB,, | Performed by: FAMILY MEDICINE

## 2022-03-04 PROCEDURE — 99499 UNLISTED E&M SERVICE: CPT | Mod: HCNC,S$GLB,, | Performed by: ALLERGY & IMMUNOLOGY

## 2022-03-04 PROCEDURE — 99499 NO LOS: ICD-10-PCS | Mod: HCNC,S$GLB,, | Performed by: ALLERGY & IMMUNOLOGY

## 2022-03-04 PROCEDURE — 95115 IMMUNOTHERAPY ONE INJECTION: CPT | Mod: HCNC,S$GLB,, | Performed by: FAMILY MEDICINE

## 2022-03-06 DIAGNOSIS — E03.9 HYPOTHYROIDISM, UNSPECIFIED TYPE: ICD-10-CM

## 2022-03-07 RX ORDER — LEVOTHYROXINE SODIUM 75 UG/1
TABLET ORAL
Qty: 30 TABLET | Refills: 0 | Status: SHIPPED | OUTPATIENT
Start: 2022-03-07 | End: 2022-03-28 | Stop reason: SDUPTHER

## 2022-03-07 NOTE — TELEPHONE ENCOUNTER
No new care gaps identified.  Powered by [employer]. Reference number: [id number].   3/06/2022 7:54:26 PM CST

## 2022-03-11 ENCOUNTER — CLINICAL SUPPORT (OUTPATIENT)
Dept: INTERNAL MEDICINE | Facility: CLINIC | Age: 72
End: 2022-03-11
Payer: MEDICARE

## 2022-03-11 DIAGNOSIS — J30.9 CHRONIC ALLERGIC RHINITIS: ICD-10-CM

## 2022-03-11 PROCEDURE — 99499 NO LOS: ICD-10-PCS | Mod: HCNC,S$GLB,, | Performed by: ALLERGY & IMMUNOLOGY

## 2022-03-11 PROCEDURE — 95115 PR IMMUNOTHERAPY, ONE INJECTION: ICD-10-PCS | Mod: HCNC,S$GLB,, | Performed by: FAMILY MEDICINE

## 2022-03-11 PROCEDURE — 99499 UNLISTED E&M SERVICE: CPT | Mod: HCNC,S$GLB,, | Performed by: ALLERGY & IMMUNOLOGY

## 2022-03-11 PROCEDURE — 95115 IMMUNOTHERAPY ONE INJECTION: CPT | Mod: HCNC,S$GLB,, | Performed by: FAMILY MEDICINE

## 2022-03-17 PROCEDURE — 99454 REM MNTR PHYSIOL PARAM 16-30: CPT | Mod: S$GLB,,, | Performed by: INTERNAL MEDICINE

## 2022-03-17 PROCEDURE — 99454 PR REMOTE MNTR, PHYS PARAM, INITIAL, EA 30 DAYS: ICD-10-PCS | Mod: S$GLB,,, | Performed by: INTERNAL MEDICINE

## 2022-03-18 ENCOUNTER — CLINICAL SUPPORT (OUTPATIENT)
Dept: INTERNAL MEDICINE | Facility: CLINIC | Age: 72
End: 2022-03-18
Payer: MEDICARE

## 2022-03-18 DIAGNOSIS — Z91.09 ALLERGY TO MITES: ICD-10-CM

## 2022-03-18 DIAGNOSIS — J30.81 ALLERGY TO CATS: ICD-10-CM

## 2022-03-18 DIAGNOSIS — J30.1 ALLERGIC RHINITIS DUE TO POLLEN, UNSPECIFIED SEASONALITY: Primary | ICD-10-CM

## 2022-03-18 PROCEDURE — 99499 NO LOS: ICD-10-PCS | Mod: S$GLB,,, | Performed by: ALLERGY & IMMUNOLOGY

## 2022-03-18 PROCEDURE — 95115 PR IMMUNOTHERAPY, ONE INJECTION: ICD-10-PCS | Mod: S$GLB,,, | Performed by: FAMILY MEDICINE

## 2022-03-18 PROCEDURE — 95115 IMMUNOTHERAPY ONE INJECTION: CPT | Mod: S$GLB,,, | Performed by: FAMILY MEDICINE

## 2022-03-18 PROCEDURE — 99499 UNLISTED E&M SERVICE: CPT | Mod: S$GLB,,, | Performed by: ALLERGY & IMMUNOLOGY

## 2022-03-18 NOTE — PROGRESS NOTES
SQ-LT UPPER ARM  ALLERGY injection red vial # 1/1 C/DM/TR/ 0.2 ml   given,tolerated well. Pt waited 30 minutes, no local reaction noted

## 2022-03-25 ENCOUNTER — CLINICAL SUPPORT (OUTPATIENT)
Dept: INTERNAL MEDICINE | Facility: CLINIC | Age: 72
End: 2022-03-25
Payer: MEDICARE

## 2022-03-25 DIAGNOSIS — J30.9 CHRONIC ALLERGIC RHINITIS: ICD-10-CM

## 2022-03-25 PROCEDURE — 99499 UNLISTED E&M SERVICE: CPT | Mod: S$GLB,,, | Performed by: ALLERGY & IMMUNOLOGY

## 2022-03-25 PROCEDURE — 95115 IMMUNOTHERAPY ONE INJECTION: CPT | Mod: S$GLB,,, | Performed by: FAMILY MEDICINE

## 2022-03-25 PROCEDURE — 95115 PR IMMUNOTHERAPY, ONE INJECTION: ICD-10-PCS | Mod: S$GLB,,, | Performed by: FAMILY MEDICINE

## 2022-03-25 PROCEDURE — 99499 NO LOS: ICD-10-PCS | Mod: S$GLB,,, | Performed by: ALLERGY & IMMUNOLOGY

## 2022-03-27 ENCOUNTER — PATIENT MESSAGE (OUTPATIENT)
Dept: INTERNAL MEDICINE | Facility: CLINIC | Age: 72
End: 2022-03-27
Payer: MEDICARE

## 2022-03-27 DIAGNOSIS — I15.2 HYPERTENSION ASSOCIATED WITH DIABETES: ICD-10-CM

## 2022-03-27 DIAGNOSIS — N18.31 TYPE 2 DIABETES MELLITUS WITH STAGE 3A CHRONIC KIDNEY DISEASE, WITH LONG-TERM CURRENT USE OF INSULIN: ICD-10-CM

## 2022-03-27 DIAGNOSIS — Z79.4 TYPE 2 DIABETES MELLITUS WITH STAGE 3 CHRONIC KIDNEY DISEASE, WITH LONG-TERM CURRENT USE OF INSULIN: ICD-10-CM

## 2022-03-27 DIAGNOSIS — E03.9 HYPOTHYROIDISM, UNSPECIFIED TYPE: ICD-10-CM

## 2022-03-27 DIAGNOSIS — N18.30 TYPE 2 DIABETES MELLITUS WITH STAGE 3 CHRONIC KIDNEY DISEASE, WITH LONG-TERM CURRENT USE OF INSULIN: ICD-10-CM

## 2022-03-27 DIAGNOSIS — E11.59 HYPERTENSION ASSOCIATED WITH DIABETES: ICD-10-CM

## 2022-03-27 DIAGNOSIS — E11.22 TYPE 2 DIABETES MELLITUS WITH STAGE 3 CHRONIC KIDNEY DISEASE, WITH LONG-TERM CURRENT USE OF INSULIN: ICD-10-CM

## 2022-03-27 DIAGNOSIS — Z79.4 TYPE 2 DIABETES MELLITUS WITH STAGE 3A CHRONIC KIDNEY DISEASE, WITH LONG-TERM CURRENT USE OF INSULIN: ICD-10-CM

## 2022-03-27 DIAGNOSIS — B00.9 RECURRENT HSV (HERPES SIMPLEX VIRUS): ICD-10-CM

## 2022-03-27 DIAGNOSIS — E11.22 TYPE 2 DIABETES MELLITUS WITH STAGE 3A CHRONIC KIDNEY DISEASE, WITH LONG-TERM CURRENT USE OF INSULIN: ICD-10-CM

## 2022-03-28 RX ORDER — VALACYCLOVIR HYDROCHLORIDE 500 MG/1
500 TABLET, FILM COATED ORAL DAILY
Qty: 90 TABLET | Refills: 1 | Status: SHIPPED | OUTPATIENT
Start: 2022-03-28 | End: 2022-10-17

## 2022-03-28 RX ORDER — GABAPENTIN 600 MG/1
600 TABLET ORAL 2 TIMES DAILY
Qty: 180 TABLET | Refills: 1 | Status: SHIPPED | OUTPATIENT
Start: 2022-03-28 | End: 2022-10-04

## 2022-03-28 RX ORDER — METFORMIN HYDROCHLORIDE 1000 MG/1
1000 TABLET ORAL 2 TIMES DAILY WITH MEALS
Qty: 180 TABLET | Refills: 1 | Status: SHIPPED | OUTPATIENT
Start: 2022-03-28 | End: 2022-10-04

## 2022-03-28 RX ORDER — AMLODIPINE BESYLATE 5 MG/1
5 TABLET ORAL NIGHTLY
Qty: 90 TABLET | Refills: 1 | Status: SHIPPED | OUTPATIENT
Start: 2022-03-28 | End: 2023-02-02

## 2022-03-28 RX ORDER — VALSARTAN 160 MG/1
160 TABLET ORAL 2 TIMES DAILY
Qty: 180 TABLET | Refills: 1 | Status: SHIPPED | OUTPATIENT
Start: 2022-03-28 | End: 2022-11-29

## 2022-03-28 RX ORDER — LEVOTHYROXINE SODIUM 75 UG/1
75 TABLET ORAL DAILY
Qty: 90 TABLET | Refills: 1 | Status: SHIPPED | OUTPATIENT
Start: 2022-03-28 | End: 2022-10-04

## 2022-03-28 RX ORDER — GLIPIZIDE 5 MG/1
TABLET ORAL
Qty: 180 TABLET | Refills: 3 | Status: SHIPPED | OUTPATIENT
Start: 2022-03-28 | End: 2023-05-30

## 2022-03-28 NOTE — TELEPHONE ENCOUNTER
No new care gaps identified.  Powered by [employer]. Reference number: [id number].   3/28/2022 10:04:24 AM CDT

## 2022-04-01 ENCOUNTER — CLINICAL SUPPORT (OUTPATIENT)
Dept: INTERNAL MEDICINE | Facility: CLINIC | Age: 72
End: 2022-04-01
Payer: MEDICARE

## 2022-04-01 DIAGNOSIS — J30.9 CHRONIC ALLERGIC RHINITIS: ICD-10-CM

## 2022-04-01 PROCEDURE — 99499 UNLISTED E&M SERVICE: CPT | Mod: S$GLB,,, | Performed by: ALLERGY & IMMUNOLOGY

## 2022-04-01 PROCEDURE — 99499 NO LOS: ICD-10-PCS | Mod: S$GLB,,, | Performed by: ALLERGY & IMMUNOLOGY

## 2022-04-01 PROCEDURE — 95115 PR IMMUNOTHERAPY, ONE INJECTION: ICD-10-PCS | Mod: S$GLB,,, | Performed by: FAMILY MEDICINE

## 2022-04-01 PROCEDURE — 95115 IMMUNOTHERAPY ONE INJECTION: CPT | Mod: S$GLB,,, | Performed by: FAMILY MEDICINE

## 2022-04-08 ENCOUNTER — CLINICAL SUPPORT (OUTPATIENT)
Dept: INTERNAL MEDICINE | Facility: CLINIC | Age: 72
End: 2022-04-08
Payer: MEDICARE

## 2022-04-08 DIAGNOSIS — J30.9 CHRONIC ALLERGIC RHINITIS: ICD-10-CM

## 2022-04-08 PROCEDURE — 95115 PR IMMUNOTHERAPY, ONE INJECTION: ICD-10-PCS | Mod: S$GLB,,, | Performed by: FAMILY MEDICINE

## 2022-04-08 PROCEDURE — 95115 IMMUNOTHERAPY ONE INJECTION: CPT | Mod: S$GLB,,, | Performed by: FAMILY MEDICINE

## 2022-04-08 PROCEDURE — 99499 UNLISTED E&M SERVICE: CPT | Mod: S$GLB,,, | Performed by: ALLERGY & IMMUNOLOGY

## 2022-04-08 PROCEDURE — 99499 NO LOS: ICD-10-PCS | Mod: S$GLB,,, | Performed by: ALLERGY & IMMUNOLOGY

## 2022-04-22 ENCOUNTER — CLINICAL SUPPORT (OUTPATIENT)
Dept: INTERNAL MEDICINE | Facility: CLINIC | Age: 72
End: 2022-04-22
Payer: MEDICARE

## 2022-04-22 DIAGNOSIS — J30.81 ALLERGY TO CATS: ICD-10-CM

## 2022-04-22 DIAGNOSIS — Z91.09 ALLERGY TO MITES: ICD-10-CM

## 2022-04-22 DIAGNOSIS — J30.1 ALLERGIC RHINITIS DUE TO POLLEN, UNSPECIFIED SEASONALITY: Primary | ICD-10-CM

## 2022-04-22 PROCEDURE — 95115 PR IMMUNOTHERAPY, ONE INJECTION: ICD-10-PCS | Mod: S$GLB,,, | Performed by: FAMILY MEDICINE

## 2022-04-22 PROCEDURE — 99499 NO LOS: ICD-10-PCS | Mod: S$GLB,,, | Performed by: ALLERGY & IMMUNOLOGY

## 2022-04-22 PROCEDURE — 95115 IMMUNOTHERAPY ONE INJECTION: CPT | Mod: S$GLB,,, | Performed by: FAMILY MEDICINE

## 2022-04-22 PROCEDURE — 99499 UNLISTED E&M SERVICE: CPT | Mod: S$GLB,,, | Performed by: ALLERGY & IMMUNOLOGY

## 2022-04-22 NOTE — PROGRESS NOTES
SQ-LT UPPER ARM  ALLERGY injection red vial #1/1 C/DM/TR/ 0.5 ml   given,tolerated well. Pt waited 30 minutes, no local reaction noted

## 2022-04-23 ENCOUNTER — OFFICE VISIT (OUTPATIENT)
Dept: INTERNAL MEDICINE | Facility: CLINIC | Age: 72
End: 2022-04-23
Payer: MEDICARE

## 2022-04-23 VITALS
SYSTOLIC BLOOD PRESSURE: 138 MMHG | DIASTOLIC BLOOD PRESSURE: 64 MMHG | HEIGHT: 62 IN | WEIGHT: 237 LBS | TEMPERATURE: 98 F | BODY MASS INDEX: 43.61 KG/M2

## 2022-04-23 DIAGNOSIS — R30.0 DYSURIA: Primary | ICD-10-CM

## 2022-04-23 DIAGNOSIS — Z87.440 HISTORY OF RECURRENT UTI (URINARY TRACT INFECTION): ICD-10-CM

## 2022-04-23 LAB
BILIRUB SERPL-MCNC: NORMAL MG/DL
BLOOD URINE, POC: NORMAL
COLOR, POC UA: YELLOW
GLUCOSE UR QL STRIP: NORMAL
KETONES UR QL STRIP: NORMAL
LEUKOCYTE ESTERASE URINE, POC: 2
NITRITE, POC UA: NORMAL
PH, POC UA: 7
PROTEIN, POC: NORMAL
SPECIFIC GRAVITY, POC UA: 1.01
UROBILINOGEN, POC UA: NORMAL

## 2022-04-23 PROCEDURE — 3044F HG A1C LEVEL LT 7.0%: CPT | Mod: CPTII,S$GLB,, | Performed by: FAMILY MEDICINE

## 2022-04-23 PROCEDURE — 1160F RVW MEDS BY RX/DR IN RCRD: CPT | Mod: CPTII,S$GLB,, | Performed by: FAMILY MEDICINE

## 2022-04-23 PROCEDURE — 3072F LOW RISK FOR RETINOPATHY: CPT | Mod: CPTII,S$GLB,, | Performed by: FAMILY MEDICINE

## 2022-04-23 PROCEDURE — 3008F PR BODY MASS INDEX (BMI) DOCUMENTED: ICD-10-PCS | Mod: CPTII,S$GLB,, | Performed by: FAMILY MEDICINE

## 2022-04-23 PROCEDURE — 87088 URINE BACTERIA CULTURE: CPT | Performed by: FAMILY MEDICINE

## 2022-04-23 PROCEDURE — 81001 URINALYSIS AUTO W/SCOPE: CPT | Mod: S$GLB,,, | Performed by: FAMILY MEDICINE

## 2022-04-23 PROCEDURE — 1125F AMNT PAIN NOTED PAIN PRSNT: CPT | Mod: CPTII,S$GLB,, | Performed by: FAMILY MEDICINE

## 2022-04-23 PROCEDURE — 3075F SYST BP GE 130 - 139MM HG: CPT | Mod: CPTII,S$GLB,, | Performed by: FAMILY MEDICINE

## 2022-04-23 PROCEDURE — 4010F ACE/ARB THERAPY RXD/TAKEN: CPT | Mod: CPTII,S$GLB,, | Performed by: FAMILY MEDICINE

## 2022-04-23 PROCEDURE — 1157F PR ADVANCE CARE PLAN OR EQUIV PRESENT IN MEDICAL RECORD: ICD-10-PCS | Mod: CPTII,S$GLB,, | Performed by: FAMILY MEDICINE

## 2022-04-23 PROCEDURE — 87086 URINE CULTURE/COLONY COUNT: CPT | Performed by: FAMILY MEDICINE

## 2022-04-23 PROCEDURE — 81001 POCT URINALYSIS, DIPSTICK OR TABLET REAGENT, AUTOMATED, WITH MICROSCOP: ICD-10-PCS | Mod: S$GLB,,, | Performed by: FAMILY MEDICINE

## 2022-04-23 PROCEDURE — 87186 SC STD MICRODIL/AGAR DIL: CPT | Performed by: FAMILY MEDICINE

## 2022-04-23 PROCEDURE — 1160F PR REVIEW ALL MEDS BY PRESCRIBER/CLIN PHARMACIST DOCUMENTED: ICD-10-PCS | Mod: CPTII,S$GLB,, | Performed by: FAMILY MEDICINE

## 2022-04-23 PROCEDURE — 3044F PR MOST RECENT HEMOGLOBIN A1C LEVEL <7.0%: ICD-10-PCS | Mod: CPTII,S$GLB,, | Performed by: FAMILY MEDICINE

## 2022-04-23 PROCEDURE — 3078F DIAST BP <80 MM HG: CPT | Mod: CPTII,S$GLB,, | Performed by: FAMILY MEDICINE

## 2022-04-23 PROCEDURE — 1159F MED LIST DOCD IN RCRD: CPT | Mod: CPTII,S$GLB,, | Performed by: FAMILY MEDICINE

## 2022-04-23 PROCEDURE — 99999 PR PBB SHADOW E&M-EST. PATIENT-LVL III: CPT | Mod: PBBFAC,,, | Performed by: FAMILY MEDICINE

## 2022-04-23 PROCEDURE — 1157F ADVNC CARE PLAN IN RCRD: CPT | Mod: CPTII,S$GLB,, | Performed by: FAMILY MEDICINE

## 2022-04-23 PROCEDURE — 87077 CULTURE AEROBIC IDENTIFY: CPT | Performed by: FAMILY MEDICINE

## 2022-04-23 PROCEDURE — 99213 PR OFFICE/OUTPT VISIT, EST, LEVL III, 20-29 MIN: ICD-10-PCS | Mod: S$GLB,,, | Performed by: FAMILY MEDICINE

## 2022-04-23 PROCEDURE — 3072F PR LOW RISK FOR RETINOPATHY: ICD-10-PCS | Mod: CPTII,S$GLB,, | Performed by: FAMILY MEDICINE

## 2022-04-23 PROCEDURE — 3078F PR MOST RECENT DIASTOLIC BLOOD PRESSURE < 80 MM HG: ICD-10-PCS | Mod: CPTII,S$GLB,, | Performed by: FAMILY MEDICINE

## 2022-04-23 PROCEDURE — 99999 PR PBB SHADOW E&M-EST. PATIENT-LVL III: ICD-10-PCS | Mod: PBBFAC,,, | Performed by: FAMILY MEDICINE

## 2022-04-23 PROCEDURE — 3075F PR MOST RECENT SYSTOLIC BLOOD PRESS GE 130-139MM HG: ICD-10-PCS | Mod: CPTII,S$GLB,, | Performed by: FAMILY MEDICINE

## 2022-04-23 PROCEDURE — 4010F PR ACE/ARB THEARPY RXD/TAKEN: ICD-10-PCS | Mod: CPTII,S$GLB,, | Performed by: FAMILY MEDICINE

## 2022-04-23 PROCEDURE — 1159F PR MEDICATION LIST DOCUMENTED IN MEDICAL RECORD: ICD-10-PCS | Mod: CPTII,S$GLB,, | Performed by: FAMILY MEDICINE

## 2022-04-23 PROCEDURE — 3008F BODY MASS INDEX DOCD: CPT | Mod: CPTII,S$GLB,, | Performed by: FAMILY MEDICINE

## 2022-04-23 PROCEDURE — 1125F PR PAIN SEVERITY QUANTIFIED, PAIN PRESENT: ICD-10-PCS | Mod: CPTII,S$GLB,, | Performed by: FAMILY MEDICINE

## 2022-04-23 PROCEDURE — 99213 OFFICE O/P EST LOW 20 MIN: CPT | Mod: S$GLB,,, | Performed by: FAMILY MEDICINE

## 2022-04-23 RX ORDER — AMOXICILLIN AND CLAVULANATE POTASSIUM 875; 125 MG/1; MG/1
1 TABLET, FILM COATED ORAL 2 TIMES DAILY
Qty: 20 TABLET | Refills: 0 | Status: SHIPPED | OUTPATIENT
Start: 2022-04-23 | End: 2022-09-23

## 2022-04-23 NOTE — PROGRESS NOTES
Subjective:   Patient ID: Vivienne Jose is a 71 y.o. female.    Chief Complaint: Back Pain      HPI    70 yo female with urinary frequency for about a week. Symptoms off and on. She is prone to UTI and has seen urogyn before.    Past three days she has had nausea occasionally mostly in am and now symptoms have progressed to flank pain to right and also to abd pain and sensation of pulling in lower abd.    No documented fevers but brief sensation of chills and feeling very hot at night.      Patient queried and denies any further complaints.    ALLERGIES AND MEDICATIONS: updated and reviewed.  Review of patient's allergies indicates:   Allergen Reactions    Sulfa (sulfonamide antibiotics) Nausea Only    Ciprofloxacin     Januvia [sitagliptin]      abd pain    Lisinopril     Lotensin [benazepril]     Nexium [esomeprazole magnesium] Other (See Comments)     Gas       Current Outpatient Medications:     amLODIPine (NORVASC) 5 MG tablet, Take 1 tablet (5 mg total) by mouth every evening., Disp: 90 tablet, Rfl: 1    ascorbic acid, vitamin C, (VITAMIN C) 100 MG tablet, Take 500 mg by mouth 2 (two) times daily. , Disp: , Rfl:     aspirin (ECOTRIN) 81 MG EC tablet, Take 81 mg by mouth once daily., Disp: , Rfl:     azelastine (ASTELIN) 137 mcg (0.1 %) nasal spray, 2 sprays (274 mcg total) by Nasal route 2 (two) times daily., Disp: 120 mL, Rfl: 4    blood sugar diagnostic (TRUE METRIX GLUCOSE TEST STRIP) Strp, TEST TWO TIMES DAILY, Disp: 200 strip, Rfl: 6    blood-glucose meter Misc, [employer] True Metrix Air meter, Disp: 1 each, Rfl: 0    calcium carbonate-vitamin D3 600 mg(1,500mg) -100 unit Cap, Take 1 tablet by mouth once daily., Disp: , Rfl:     chlorthalidone (HYGROTEN) 25 MG Tab, TAKE 2 TABLETS EVERY DAY, Disp: 180 tablet, Rfl: 3    ESTRING 2 mg (7.5 mcg /24 hour) vaginal ring, INSERT 1 RING VAGINALLY AS DIRECTED, CHANGE EVERY 90 DAYS, Disp: 1 each, Rfl: 1    fluticasone propionate (FLONASE) 50  "mcg/actuation nasal spray, 2 sprays (100 mcg total) by Each Nostril route once daily., Disp: 48 g, Rfl: 4    gabapentin (NEURONTIN) 600 MG tablet, Take 1 tablet (600 mg total) by mouth 2 (two) times daily., Disp: 180 tablet, Rfl: 1    ketotifen (ZADITOR) 0.025 % (0.035 %) ophthalmic solution, Place 1-2 drops into both eyes once daily., Disp: , Rfl:     L.acid-B.bifidum-B.animal-FOS 25 billion cell -100 mg Cap, Take 1 capsule by mouth once daily., Disp: , Rfl:     lancets (TRUEPLUS LANCETS) 28 gauge Misc, Inject 1 lancet into the skin 2 (two) times daily before meals., Disp: 200 each, Rfl: 6    levothyroxine (SYNTHROID) 75 MCG tablet, Take 1 tablet (75 mcg total) by mouth once daily., Disp: 90 tablet, Rfl: 1    loratadine (CLARITIN) 10 mg tablet, Take 10 mg by mouth once daily., Disp: , Rfl:     lovastatin (MEVACOR) 40 MG tablet, TAKE 1 TABLET NIGHTLY (SUBSTITUTED FOR MEVACOR), Disp: 90 tablet, Rfl: 1    magnesium oxide-Mg AA chelate (MG-PLUS-PROTEIN) 133 mg Tab, Take by mouth as directed., Disp: , Rfl:     metFORMIN (GLUCOPHAGE) 1000 MG tablet, Take 1 tablet (1,000 mg total) by mouth 2 (two) times daily with meals., Disp: 180 tablet, Rfl: 1    mv-mn/folic acid/vit [id number] (ALIVE ONCE DAILY WOMEN 50 PLUS ORAL), Take 1 tablet by mouth once daily., Disp: , Rfl:     omeprazole (PRILOSEC) 20 MG capsule, TAKE 1 CAPSULE EVERY DAY AS NEEDED, Disp: 90 capsule, Rfl: 0    pen needle, diabetic (BD ULTRA-FINE EDUARDO PEN NEEDLE) 32 gauge x 5/32" Ndle, USE AS DIRECTED, Disp: 200 each, Rfl: 6    potassium chloride SA (K-DUR,KLOR-CON) 10 MEQ tablet, Take 3 tablets (30 meq) PO daily, Disp: 270 tablet, Rfl: 3    semaglutide (OZEMPIC) 0.25 mg or 0.5 mg(2 mg/1.5 mL) PnIj, Inject 0.375 mLs (0.5 mg total) into the skin every 7 days., Disp: 3 Syringe, Rfl: 4    TRESIBA FLEXTOUCH U-100 100 unit/mL (3 mL) insulin pen, INJECT 38 UNITS INTO THE SKIN EVERY EVENING., Disp: 10 pen, Rfl: 0    valACYclovir (VALTREX) 500 MG tablet, " Take 1 tablet (500 mg total) by mouth once daily., Disp: 90 tablet, Rfl: 1    valsartan (DIOVAN) 160 MG tablet, Take 1 tablet (160 mg total) by mouth 2 (two) times daily., Disp: 180 tablet, Rfl: 1    amoxicillin-clavulanate 875-125mg (AUGMENTIN) 875-125 mg per tablet, Take 1 tablet by mouth 2 (two) times daily., Disp: 20 tablet, Rfl: 0    glipiZIDE (GLUCOTROL) 5 MG tablet, TAKE 1 TABLET TWICE DAILY BEFORE A MEAL, Disp: 180 tablet, Rfl: 3    Review of Systems   Constitutional: Negative for activity change, chills, diaphoresis, fatigue and fever.   HENT: Negative for congestion, postnasal drip, sinus pressure, sinus pain and sore throat.    Respiratory: Negative for cough, chest tightness and shortness of breath.    Cardiovascular: Negative for chest pain and palpitations.   Gastrointestinal: Negative for abdominal pain, nausea and vomiting.   Genitourinary: Positive for frequency and urgency. Negative for decreased urine volume, difficulty urinating, dysuria, vaginal bleeding and vaginal discharge.   Musculoskeletal: Negative for arthralgias, back pain and myalgias.   Skin: Negative for rash.   Neurological: Negative for dizziness, weakness and headaches.   Psychiatric/Behavioral: Negative for dysphoric mood and sleep disturbance. The patient is not nervous/anxious.        Lab Results   Component Value Date    WBC 9.31 02/07/2022    HGB 13.0 02/07/2022    HCT 41.3 02/07/2022    MCV 95 02/07/2022     02/07/2022         CMP  Sodium   Date Value Ref Range Status   02/07/2022 142 136 - 145 mmol/L Final     Potassium   Date Value Ref Range Status   02/07/2022 3.5 3.5 - 5.1 mmol/L Final     Chloride   Date Value Ref Range Status   02/07/2022 103 95 - 110 mmol/L Final     CO2   Date Value Ref Range Status   02/07/2022 29 23 - 29 mmol/L Final     Glucose   Date Value Ref Range Status   02/07/2022 114 (H) 70 - 110 mg/dL Final     BUN   Date Value Ref Range Status   02/07/2022 15 8 - 23 mg/dL Final     Creatinine  "  Date Value Ref Range Status   02/07/2022 0.8 0.5 - 1.4 mg/dL Final     Calcium   Date Value Ref Range Status   02/07/2022 9.9 8.7 - 10.5 mg/dL Final     Total Protein   Date Value Ref Range Status   02/07/2022 7.4 6.0 - 8.4 g/dL Final     Albumin   Date Value Ref Range Status   02/07/2022 3.2 (L) 3.5 - 5.2 g/dL Final     Total Bilirubin   Date Value Ref Range Status   02/07/2022 0.5 0.1 - 1.0 mg/dL Final     Comment:     For infants and newborns, interpretation of results should be based  on gestational age, weight and in agreement with clinical  observations.    Premature Infant recommended reference ranges:  Up to 24 hours.............<8.0 mg/dL  Up to 48 hours............<12.0 mg/dL  3-5 days..................<15.0 mg/dL  6-29 days.................<15.0 mg/dL       Alkaline Phosphatase   Date Value Ref Range Status   02/07/2022 78 55 - 135 U/L Final     AST   Date Value Ref Range Status   02/07/2022 20 10 - 40 U/L Final     ALT   Date Value Ref Range Status   02/07/2022 20 10 - 44 U/L Final     Anion Gap   Date Value Ref Range Status   02/07/2022 10 8 - 16 mmol/L Final     eGFR if    Date Value Ref Range Status   02/07/2022 >60.0 >60 mL/min/1.73 m^2 Final     eGFR if non    Date Value Ref Range Status   02/07/2022 >60.0 >60 mL/min/1.73 m^2 Final     Comment:     Calculation used to obtain the estimated glomerular filtration  rate (eGFR) is the CKD-EPI equation.           Lab Results   Component Value Date    HGBA1C 6.6 (H) 02/07/2022        Objective:     Vitals:    04/23/22 0859   BP: 138/64   Temp: 97.9 °F (36.6 °C)   TempSrc: Oral   Weight: 107.5 kg (236 lb 15.9 oz)   Height: 5' 2" (1.575 m)   PainSc:   2   PainLoc: Back     Body mass index is 43.35 kg/m².    Physical Exam  Vitals and nursing note reviewed.   Constitutional:       General: She is not in acute distress.     Appearance: She is well-developed. She is not diaphoretic.   HENT:      Head: Normocephalic and " atraumatic.      Right Ear: Hearing, tympanic membrane, ear canal and external ear normal. No tenderness.      Left Ear: Hearing, tympanic membrane, ear canal and external ear normal. No tenderness.      Nose: Nose normal.      Mouth/Throat:      Dentition: Normal dentition.      Pharynx: No oropharyngeal exudate or posterior oropharyngeal erythema.   Eyes:      General: Lids are normal. No scleral icterus.        Right eye: No discharge.         Left eye: No discharge.      Extraocular Movements:      Right eye: Normal extraocular motion.      Left eye: Normal extraocular motion.      Conjunctiva/sclera: Conjunctivae normal.      Right eye: Right conjunctiva is not injected.      Left eye: Left conjunctiva is not injected.   Neck:      Thyroid: No thyromegaly.      Vascular: No carotid bruit or JVD.      Trachea: No tracheal deviation.   Cardiovascular:      Rate and Rhythm: Normal rate and regular rhythm.      Pulses: Normal pulses.      Heart sounds: Normal heart sounds. No murmur heard.    No friction rub.   Pulmonary:      Effort: Pulmonary effort is normal. No accessory muscle usage or respiratory distress.      Breath sounds: Normal breath sounds. No wheezing, rhonchi or rales.   Musculoskeletal:      Cervical back: Normal range of motion and neck supple. No edema.   Lymphadenopathy:      Head:      Right side of head: No submandibular adenopathy.      Left side of head: No submandibular adenopathy.      Cervical: No cervical adenopathy.   Skin:     General: Skin is warm and dry.   Neurological:      Mental Status: She is alert and oriented to person, place, and time.   Psychiatric:         Mood and Affect: Mood is not anxious or depressed. Affect is not labile, angry or inappropriate.         Speech: Speech normal.         Behavior: Behavior normal. Behavior is cooperative.         Thought Content: Thought content normal.         Assessment and Plan:   Vivienne was seen today for back pain.    Diagnoses and all  orders for this visit:    Dysuria  -     Urine Culture High Risk  -     POCT urinalysis, dipstick or tablet reag  -     Cancel: POCT urine dipstick without microscope    History of recurrent UTI (urinary tract infection)  -     Urine Culture High Risk  -     POCT urinalysis, dipstick or tablet reag    Other orders  -     amoxicillin-clavulanate 875-125mg (AUGMENTIN) 875-125 mg per tablet; Take 1 tablet by mouth 2 (two) times daily.    Empiric treatment.  Follow culture.  Follow-up with PCP and Urology    Declines Pyridium    No follow-ups on file.    THIS NOTE WILL BE SHARED WITH THE PATIENT.

## 2022-04-27 PROBLEM — Z87.440 HISTORY OF RECURRENT UTI (URINARY TRACT INFECTION): Status: ACTIVE | Noted: 2022-04-27

## 2022-04-27 LAB — BACTERIA UR CULT: ABNORMAL

## 2022-04-29 ENCOUNTER — CLINICAL SUPPORT (OUTPATIENT)
Dept: INTERNAL MEDICINE | Facility: CLINIC | Age: 72
End: 2022-04-29
Payer: MEDICARE

## 2022-04-29 DIAGNOSIS — J30.81 ALLERGY TO CATS: ICD-10-CM

## 2022-04-29 DIAGNOSIS — Z91.09 ALLERGY TO MITES: ICD-10-CM

## 2022-04-29 DIAGNOSIS — J30.1 ALLERGIC RHINITIS DUE TO POLLEN, UNSPECIFIED SEASONALITY: Primary | ICD-10-CM

## 2022-04-29 PROCEDURE — 95115 IMMUNOTHERAPY ONE INJECTION: CPT | Mod: S$GLB,,, | Performed by: STUDENT IN AN ORGANIZED HEALTH CARE EDUCATION/TRAINING PROGRAM

## 2022-04-29 PROCEDURE — 95115 PR IMMUNOTHERAPY, ONE INJECTION: ICD-10-PCS | Mod: S$GLB,,, | Performed by: STUDENT IN AN ORGANIZED HEALTH CARE EDUCATION/TRAINING PROGRAM

## 2022-04-29 PROCEDURE — 99499 NO LOS: ICD-10-PCS | Mod: S$GLB,,, | Performed by: ALLERGY & IMMUNOLOGY

## 2022-04-29 PROCEDURE — 99499 UNLISTED E&M SERVICE: CPT | Mod: S$GLB,,, | Performed by: ALLERGY & IMMUNOLOGY

## 2022-04-29 NOTE — PROGRESS NOTES
SQ-LT UPPER ARM  ALLERGY injection red vial #1/1 C/DM/TR/ 0.45 ml   given,tolerated well. Pt waited 30 minutes, no local reaction noted

## 2022-05-12 DIAGNOSIS — E11.69 DYSLIPIDEMIA ASSOCIATED WITH TYPE 2 DIABETES MELLITUS: ICD-10-CM

## 2022-05-12 DIAGNOSIS — E78.5 DYSLIPIDEMIA ASSOCIATED WITH TYPE 2 DIABETES MELLITUS: ICD-10-CM

## 2022-05-12 RX ORDER — LOVASTATIN 40 MG/1
TABLET ORAL
Qty: 90 TABLET | Refills: 1 | OUTPATIENT
Start: 2022-05-12

## 2022-05-12 NOTE — TELEPHONE ENCOUNTER
No new care gaps identified.  [facility] Embedded Care Gaps. Reference number: [id number]. 5/12/2022   6:52:36 PM CDT

## 2022-05-13 ENCOUNTER — CLINICAL SUPPORT (OUTPATIENT)
Dept: INTERNAL MEDICINE | Facility: CLINIC | Age: 72
End: 2022-05-13
Payer: MEDICARE

## 2022-05-13 DIAGNOSIS — J30.9 CHRONIC ALLERGIC RHINITIS: ICD-10-CM

## 2022-05-13 PROCEDURE — 99499 NO LOS: ICD-10-PCS | Mod: S$GLB,,, | Performed by: ALLERGY & IMMUNOLOGY

## 2022-05-13 PROCEDURE — 95115 IMMUNOTHERAPY ONE INJECTION: CPT | Mod: S$GLB,,, | Performed by: FAMILY MEDICINE

## 2022-05-13 PROCEDURE — 95115 PR IMMUNOTHERAPY, ONE INJECTION: ICD-10-PCS | Mod: S$GLB,,, | Performed by: FAMILY MEDICINE

## 2022-05-13 PROCEDURE — 99499 UNLISTED E&M SERVICE: CPT | Mod: S$GLB,,, | Performed by: ALLERGY & IMMUNOLOGY

## 2022-05-13 NOTE — TELEPHONE ENCOUNTER
Marcela DC. Request already responded to by other means (e.g. phone or fax)   Refill Authorization Note   Vivienne Jose  is requesting a refill authorization.  Brief Assessment and Rationale for Refill:  Quick Discontinue  Medication Therapy Plan:       Medication Reconciliation Completed:  No      Comments: Receipt electronically confirmed by requesting pharmacy    Note composed:9:07 PM 05/12/2022

## 2022-05-18 ENCOUNTER — OFFICE VISIT (OUTPATIENT)
Dept: OPTOMETRY | Facility: CLINIC | Age: 72
End: 2022-05-18
Payer: MEDICARE

## 2022-05-18 DIAGNOSIS — H52.13 MYOPIA WITH ASTIGMATISM AND PRESBYOPIA, BILATERAL: ICD-10-CM

## 2022-05-18 DIAGNOSIS — E11.9 TYPE 2 DIABETES MELLITUS WITHOUT RETINOPATHY: Primary | ICD-10-CM

## 2022-05-18 DIAGNOSIS — H52.203 MYOPIA WITH ASTIGMATISM AND PRESBYOPIA, BILATERAL: ICD-10-CM

## 2022-05-18 DIAGNOSIS — H52.4 MYOPIA WITH ASTIGMATISM AND PRESBYOPIA, BILATERAL: ICD-10-CM

## 2022-05-18 DIAGNOSIS — H25.13 NUCLEAR SCLEROSIS, BILATERAL: ICD-10-CM

## 2022-05-18 PROCEDURE — 99999 PR PBB SHADOW E&M-EST. PATIENT-LVL II: CPT | Mod: PBBFAC,,, | Performed by: OPTOMETRIST

## 2022-05-18 PROCEDURE — 92014 COMPRE OPH EXAM EST PT 1/>: CPT | Mod: S$GLB,,, | Performed by: OPTOMETRIST

## 2022-05-18 PROCEDURE — 1159F PR MEDICATION LIST DOCUMENTED IN MEDICAL RECORD: ICD-10-PCS | Mod: CPTII,S$GLB,, | Performed by: OPTOMETRIST

## 2022-05-18 PROCEDURE — 2023F DILAT RTA XM W/O RTNOPTHY: CPT | Mod: CPTII,S$GLB,, | Performed by: OPTOMETRIST

## 2022-05-18 PROCEDURE — 4010F PR ACE/ARB THEARPY RXD/TAKEN: ICD-10-PCS | Mod: CPTII,S$GLB,, | Performed by: OPTOMETRIST

## 2022-05-18 PROCEDURE — 3044F HG A1C LEVEL LT 7.0%: CPT | Mod: CPTII,S$GLB,, | Performed by: OPTOMETRIST

## 2022-05-18 PROCEDURE — 1126F AMNT PAIN NOTED NONE PRSNT: CPT | Mod: CPTII,S$GLB,, | Performed by: OPTOMETRIST

## 2022-05-18 PROCEDURE — 1157F ADVNC CARE PLAN IN RCRD: CPT | Mod: CPTII,S$GLB,, | Performed by: OPTOMETRIST

## 2022-05-18 PROCEDURE — 1101F PR PT FALLS ASSESS DOC 0-1 FALLS W/OUT INJ PAST YR: ICD-10-PCS | Mod: CPTII,S$GLB,, | Performed by: OPTOMETRIST

## 2022-05-18 PROCEDURE — 1101F PT FALLS ASSESS-DOCD LE1/YR: CPT | Mod: CPTII,S$GLB,, | Performed by: OPTOMETRIST

## 2022-05-18 PROCEDURE — 3288F FALL RISK ASSESSMENT DOCD: CPT | Mod: CPTII,S$GLB,, | Performed by: OPTOMETRIST

## 2022-05-18 PROCEDURE — 2023F PR DILATED RETINAL EXAM W/O EVID OF RETINOPATHY: ICD-10-PCS | Mod: CPTII,S$GLB,, | Performed by: OPTOMETRIST

## 2022-05-18 PROCEDURE — 4010F ACE/ARB THERAPY RXD/TAKEN: CPT | Mod: CPTII,S$GLB,, | Performed by: OPTOMETRIST

## 2022-05-18 PROCEDURE — 92014 PR EYE EXAM, EST PATIENT,COMPREHESV: ICD-10-PCS | Mod: S$GLB,,, | Performed by: OPTOMETRIST

## 2022-05-18 PROCEDURE — 1160F RVW MEDS BY RX/DR IN RCRD: CPT | Mod: CPTII,S$GLB,, | Performed by: OPTOMETRIST

## 2022-05-18 PROCEDURE — 3288F PR FALLS RISK ASSESSMENT DOCUMENTED: ICD-10-PCS | Mod: CPTII,S$GLB,, | Performed by: OPTOMETRIST

## 2022-05-18 PROCEDURE — 3044F PR MOST RECENT HEMOGLOBIN A1C LEVEL <7.0%: ICD-10-PCS | Mod: CPTII,S$GLB,, | Performed by: OPTOMETRIST

## 2022-05-18 PROCEDURE — 1157F PR ADVANCE CARE PLAN OR EQUIV PRESENT IN MEDICAL RECORD: ICD-10-PCS | Mod: CPTII,S$GLB,, | Performed by: OPTOMETRIST

## 2022-05-18 PROCEDURE — 1160F PR REVIEW ALL MEDS BY PRESCRIBER/CLIN PHARMACIST DOCUMENTED: ICD-10-PCS | Mod: CPTII,S$GLB,, | Performed by: OPTOMETRIST

## 2022-05-18 PROCEDURE — 99999 PR PBB SHADOW E&M-EST. PATIENT-LVL II: ICD-10-PCS | Mod: PBBFAC,,, | Performed by: OPTOMETRIST

## 2022-05-18 PROCEDURE — 1126F PR PAIN SEVERITY QUANTIFIED, NO PAIN PRESENT: ICD-10-PCS | Mod: CPTII,S$GLB,, | Performed by: OPTOMETRIST

## 2022-05-18 PROCEDURE — 1159F MED LIST DOCD IN RCRD: CPT | Mod: CPTII,S$GLB,, | Performed by: OPTOMETRIST

## 2022-05-18 NOTE — PROGRESS NOTES
HPI     Routine diabetic eye exam-dle-5/17/21    Pt denies any blurred vision. Needing updated glasses rx. Complains of new   floaters May 3rd OD. Denies any flashes. BSL controlled. Using otc allergy   gtts prn.    Hemoglobin A1C       Date                     Value               Ref Range             Status                02/07/2022               6.6 (H)             4.0 - 5.6 %           Final              Comment:    ADA Screening Guidelines:  5.7-6.4%  Consistent with   prediabetes  >or=6.5%  Consistent with diabetes    High levels of fetal   hemoglobin interfere with the HbA1C  assay. Heterozygous hemoglobin   variants (HbS, HgC, etc)do  not significantly interfere with this assay.     However, presence of multiple variants may affect accuracy.         07/23/2021               6.2 (H)             4.0 - 5.6 %           Final              Comment:    ADA Screening Guidelines:  5.7-6.4%  Consistent with   prediabetes  >or=6.5%  Consistent with diabetes    High levels of fetal   hemoglobin interfere with the HbA1C  assay. Heterozygous hemoglobin   variants (HbS, HgC, etc)do  not significantly interfere with this assay.     However, presence of multiple variants may affect accuracy.         02/08/2021               6.6 (H)             4.0 - 5.6 %           Final              Comment:    ADA Screening Guidelines:  5.7-6.4%  Consistent with   prediabetes  >or=6.5%  Consistent with diabetes    High levels of fetal   hemoglobin interfere with the HbA1C  assay. Heterozygous hemoglobin   variants (HbS, HgC, etc)do  not significantly interfere with this assay.     However, presence of multiple variants may affect accuracy.         02/08/2021               6.6 (H)             4.0 - 5.6 %           Final              Comment:    ADA Screening Guidelines:  5.7-6.4%  Consistent with   prediabetes  >or=6.5%  Consistent with diabetes    High levels of fetal   hemoglobin interfere with the HbA1C  assay. Heterozygous hemoglobin    variants (HbS, HgC, etc)do  not significantly interfere with this assay.     However, presence of multiple variants may affect accuracy.    ----------      Last edited by Diana Jefferson on 5/18/2022  9:46 AM. (History)            Assessment /Plan     For exam results, see Encounter Report.    Type 2 diabetes mellitus without retinopathy    Nuclear sclerosis, bilateral    Myopia with astigmatism and presbyopia, bilateral      1. No diabetic retinopathy, no csme. Return in 1 year for dilated eye exam.  2. Educated pt on presence of cataracts and effects on vision. No surgery at this time. Recheck in one year.  3. Spectacle Rx given, discussed different options for glasses. RTC 1 year routine eye exam.

## 2022-05-19 ENCOUNTER — HOSPITAL ENCOUNTER (OUTPATIENT)
Dept: RADIOLOGY | Facility: HOSPITAL | Age: 72
Discharge: HOME | End: 2022-05-19
Attending: ORTHOPAEDIC SURGERY
Payer: MEDICARE

## 2022-05-19 ENCOUNTER — OFFICE VISIT (OUTPATIENT)
Dept: ORTHOPEDICS | Facility: CLINIC | Age: 72
End: 2022-05-19
Payer: MEDICARE

## 2022-05-19 VITALS — WEIGHT: 236 LBS | HEIGHT: 62 IN | BODY MASS INDEX: 43.43 KG/M2

## 2022-05-19 DIAGNOSIS — M79.642 PAIN IN BOTH HANDS: ICD-10-CM

## 2022-05-19 DIAGNOSIS — M79.641 PAIN IN BOTH HANDS: Primary | ICD-10-CM

## 2022-05-19 DIAGNOSIS — M79.641 PAIN IN BOTH HANDS: ICD-10-CM

## 2022-05-19 DIAGNOSIS — M65.4 TENDINITIS, DE QUERVAIN'S: Primary | ICD-10-CM

## 2022-05-19 DIAGNOSIS — M79.642 PAIN IN BOTH HANDS: Primary | ICD-10-CM

## 2022-05-19 PROCEDURE — 1125F AMNT PAIN NOTED PAIN PRSNT: CPT | Mod: CPTII,S$GLB,, | Performed by: ORTHOPAEDIC SURGERY

## 2022-05-19 PROCEDURE — 3288F FALL RISK ASSESSMENT DOCD: CPT | Mod: CPTII,S$GLB,, | Performed by: ORTHOPAEDIC SURGERY

## 2022-05-19 PROCEDURE — 73130 X-RAY EXAM OF HAND: CPT | Mod: 26,LT,, | Performed by: RADIOLOGY

## 2022-05-19 PROCEDURE — 3072F LOW RISK FOR RETINOPATHY: CPT | Mod: CPTII,S$GLB,, | Performed by: ORTHOPAEDIC SURGERY

## 2022-05-19 PROCEDURE — 99999 PR PBB SHADOW E&M-EST. PATIENT-LVL II: ICD-10-PCS | Mod: PBBFAC,,, | Performed by: ORTHOPAEDIC SURGERY

## 2022-05-19 PROCEDURE — 99999 PR PBB SHADOW E&M-EST. PATIENT-LVL II: CPT | Mod: PBBFAC,,, | Performed by: ORTHOPAEDIC SURGERY

## 2022-05-19 PROCEDURE — 4010F ACE/ARB THERAPY RXD/TAKEN: CPT | Mod: CPTII,S$GLB,, | Performed by: ORTHOPAEDIC SURGERY

## 2022-05-19 PROCEDURE — 73130 X-RAY EXAM OF HAND: CPT | Mod: TC,PN,RT

## 2022-05-19 PROCEDURE — 20550 NJX 1 TENDON SHEATH/LIGAMENT: CPT | Mod: LT,S$GLB,, | Performed by: ORTHOPAEDIC SURGERY

## 2022-05-19 PROCEDURE — 1159F PR MEDICATION LIST DOCUMENTED IN MEDICAL RECORD: ICD-10-PCS | Mod: CPTII,S$GLB,, | Performed by: ORTHOPAEDIC SURGERY

## 2022-05-19 PROCEDURE — 3044F PR MOST RECENT HEMOGLOBIN A1C LEVEL <7.0%: ICD-10-PCS | Mod: CPTII,S$GLB,, | Performed by: ORTHOPAEDIC SURGERY

## 2022-05-19 PROCEDURE — 3072F PR LOW RISK FOR RETINOPATHY: ICD-10-PCS | Mod: CPTII,S$GLB,, | Performed by: ORTHOPAEDIC SURGERY

## 2022-05-19 PROCEDURE — 4010F PR ACE/ARB THEARPY RXD/TAKEN: ICD-10-PCS | Mod: CPTII,S$GLB,, | Performed by: ORTHOPAEDIC SURGERY

## 2022-05-19 PROCEDURE — 3008F PR BODY MASS INDEX (BMI) DOCUMENTED: ICD-10-PCS | Mod: CPTII,S$GLB,, | Performed by: ORTHOPAEDIC SURGERY

## 2022-05-19 PROCEDURE — 20550 TENDON SHEATH: ICD-10-PCS | Mod: LT,S$GLB,, | Performed by: ORTHOPAEDIC SURGERY

## 2022-05-19 PROCEDURE — 1157F PR ADVANCE CARE PLAN OR EQUIV PRESENT IN MEDICAL RECORD: ICD-10-PCS | Mod: CPTII,S$GLB,, | Performed by: ORTHOPAEDIC SURGERY

## 2022-05-19 PROCEDURE — 99213 OFFICE O/P EST LOW 20 MIN: CPT | Mod: 25,S$GLB,, | Performed by: ORTHOPAEDIC SURGERY

## 2022-05-19 PROCEDURE — 3288F PR FALLS RISK ASSESSMENT DOCUMENTED: ICD-10-PCS | Mod: CPTII,S$GLB,, | Performed by: ORTHOPAEDIC SURGERY

## 2022-05-19 PROCEDURE — 73130 X-RAY EXAM OF HAND: CPT | Mod: 26,RT,, | Performed by: RADIOLOGY

## 2022-05-19 PROCEDURE — 3044F HG A1C LEVEL LT 7.0%: CPT | Mod: CPTII,S$GLB,, | Performed by: ORTHOPAEDIC SURGERY

## 2022-05-19 PROCEDURE — 1101F PT FALLS ASSESS-DOCD LE1/YR: CPT | Mod: CPTII,S$GLB,, | Performed by: ORTHOPAEDIC SURGERY

## 2022-05-19 PROCEDURE — 73130 X-RAY EXAM OF HAND: CPT | Mod: TC,PN,LT

## 2022-05-19 PROCEDURE — 1125F PR PAIN SEVERITY QUANTIFIED, PAIN PRESENT: ICD-10-PCS | Mod: CPTII,S$GLB,, | Performed by: ORTHOPAEDIC SURGERY

## 2022-05-19 PROCEDURE — 3008F BODY MASS INDEX DOCD: CPT | Mod: CPTII,S$GLB,, | Performed by: ORTHOPAEDIC SURGERY

## 2022-05-19 PROCEDURE — 1101F PR PT FALLS ASSESS DOC 0-1 FALLS W/OUT INJ PAST YR: ICD-10-PCS | Mod: CPTII,S$GLB,, | Performed by: ORTHOPAEDIC SURGERY

## 2022-05-19 PROCEDURE — 1157F ADVNC CARE PLAN IN RCRD: CPT | Mod: CPTII,S$GLB,, | Performed by: ORTHOPAEDIC SURGERY

## 2022-05-19 PROCEDURE — 1159F MED LIST DOCD IN RCRD: CPT | Mod: CPTII,S$GLB,, | Performed by: ORTHOPAEDIC SURGERY

## 2022-05-19 PROCEDURE — 99213 PR OFFICE/OUTPT VISIT, EST, LEVL III, 20-29 MIN: ICD-10-PCS | Mod: 25,S$GLB,, | Performed by: ORTHOPAEDIC SURGERY

## 2022-05-19 PROCEDURE — 73130 XR HAND COMPLETE 3 VIEW LEFT: ICD-10-PCS | Mod: 26,LT,, | Performed by: RADIOLOGY

## 2022-05-19 RX ORDER — TRIAMCINOLONE ACETONIDE 40 MG/ML
20 INJECTION, SUSPENSION INTRA-ARTICULAR; INTRAMUSCULAR
Status: DISCONTINUED | OUTPATIENT
Start: 2022-05-19 | End: 2022-05-19 | Stop reason: HOSPADM

## 2022-05-19 RX ADMIN — TRIAMCINOLONE ACETONIDE 20 MG: 40 INJECTION, SUSPENSION INTRA-ARTICULAR; INTRAMUSCULAR at 02:05

## 2022-05-19 NOTE — PROGRESS NOTES
Subjective:      Patient ID: Vivienne Jose is a 71 y.o. female.    Chief Complaint: Consult (Keshawn Hand )      HPI: Vivienne Jose is here with new complaints. She reports a couple months h/o left wrist pain that has gradually worsened. Pain is located radially/dorsally and worse with lifting/ griping. Pain is associated with swelling. She denies any relevant h/o injury, numbness, or tingling.     Past Medical History:   Diagnosis Date    Allergy     Angio-edema     Arthritis     Cataract     bilateral - not removed    Cerebrovascular malformation     Cirrhosis 11/24/2020    Colon polyps     Coronary artery disease     Diabetes mellitus, type 2     Diabetic peripheral neuropathy     GERD (gastroesophageal reflux disease)     Herpes infection     Hyperlipidemia     Hypertension     Hypothyroidism     Kidney stones     Mild nonproliferative diabetic retinopathy of 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 edema associated with type 2 diabetes mellitus 4/18/2019    Nausea & vomiting 11/23/2015    Obesity, morbid     Ovarian cyst     Seizure disorder, focal motor     Sleep apnea     Type II or unspecified type diabetes mellitus with neurological manifestations, uncontrolled(250.62)     Urticaria        Current Outpatient Medications:     amLODIPine (NORVASC) 5 MG tablet, Take 1 tablet (5 mg total) by mouth every evening., Disp: 90 tablet, Rfl: 1    ascorbic acid, vitamin C, (VITAMIN C) 100 MG tablet, Take 500 mg by mouth 2 (two) times daily. , Disp: , Rfl:     aspirin (ECOTRIN) 81 MG EC tablet, Take 81 mg by mouth once daily., Disp: , Rfl:     blood sugar diagnostic (TRUE METRIX GLUCOSE TEST STRIP) Strp, TEST TWO TIMES DAILY, Disp: 200 strip, Rfl: 6    blood-glucose meter Misc, [employer] True Metrix Air meter, Disp: 1 each, Rfl: 0    calcium carbonate-vitamin D3 600 mg(1,500mg) -100 unit Cap, Take 1 tablet by mouth once daily., Disp: , Rfl:     chlorthalidone (HYGROTEN) 25 MG Tab, TAKE 2 TABLETS EVERY DAY,  "Disp: 180 tablet, Rfl: 3    ESTRING 2 mg (7.5 mcg /24 hour) vaginal ring, INSERT 1 RING VAGINALLY AS DIRECTED, CHANGE EVERY 90 DAYS, Disp: 1 each, Rfl: 1    fluticasone propionate (FLONASE) 50 mcg/actuation nasal spray, 2 sprays (100 mcg total) by Each Nostril route once daily., Disp: 48 g, Rfl: 4    gabapentin (NEURONTIN) 600 MG tablet, Take 1 tablet (600 mg total) by mouth 2 (two) times daily., Disp: 180 tablet, Rfl: 1    glipiZIDE (GLUCOTROL) 5 MG tablet, TAKE 1 TABLET TWICE DAILY BEFORE A MEAL, Disp: 180 tablet, Rfl: 3    ketotifen (ZADITOR) 0.025 % (0.035 %) ophthalmic solution, Place 1-2 drops into both eyes once daily., Disp: , Rfl:     L.acid-B.bifidum-B.animal-FOS 25 billion cell -100 mg Cap, Take 1 capsule by mouth once daily., Disp: , Rfl:     lancets (TRUEPLUS LANCETS) 28 gauge Misc, Inject 1 lancet into the skin 2 (two) times daily before meals., Disp: 200 each, Rfl: 6    levothyroxine (SYNTHROID) 75 MCG tablet, Take 1 tablet (75 mcg total) by mouth once daily., Disp: 90 tablet, Rfl: 1    loratadine (CLARITIN) 10 mg tablet, Take 10 mg by mouth once daily., Disp: , Rfl:     lovastatin (MEVACOR) 40 MG tablet, TAKE 1 TABLET NIGHTLY (SUBSTITUTED FOR MEVACOR), Disp: 90 tablet, Rfl: 2    magnesium oxide-Mg AA chelate (MG-PLUS-PROTEIN) 133 mg Tab, Take by mouth as directed., Disp: , Rfl:     metFORMIN (GLUCOPHAGE) 1000 MG tablet, Take 1 tablet (1,000 mg total) by mouth 2 (two) times daily with meals., Disp: 180 tablet, Rfl: 1    mv-mn/folic acid/vit [id number] (ALIVE ONCE DAILY WOMEN 50 PLUS ORAL), Take 1 tablet by mouth once daily., Disp: , Rfl:     omeprazole (PRILOSEC) 20 MG capsule, TAKE 1 CAPSULE EVERY DAY AS NEEDED, Disp: 90 capsule, Rfl: 0    pen needle, diabetic (BD ULTRA-FINE EDUARDO PEN NEEDLE) 32 gauge x 5/32" Ndle, USE AS DIRECTED, Disp: 200 each, Rfl: 6    potassium chloride SA (K-DUR,KLOR-CON) 10 MEQ tablet, Take 3 tablets (30 meq) PO daily, Disp: 270 tablet, Rfl: 3    semaglutide " "(OZEMPIC) 0.25 mg or 0.5 mg(2 mg/1.5 mL) PnIj, Inject 0.375 mLs (0.5 mg total) into the skin every 7 days., Disp: 3 Syringe, Rfl: 4    TRESIBA FLEXTOUCH U-100 100 unit/mL (3 mL) insulin pen, INJECT 38 UNITS INTO THE SKIN EVERY EVENING., Disp: 10 pen, Rfl: 0    valACYclovir (VALTREX) 500 MG tablet, Take 1 tablet (500 mg total) by mouth once daily., Disp: 90 tablet, Rfl: 1    valsartan (DIOVAN) 160 MG tablet, Take 1 tablet (160 mg total) by mouth 2 (two) times daily., Disp: 180 tablet, Rfl: 1    amoxicillin-clavulanate 875-125mg (AUGMENTIN) 875-125 mg per tablet, Take 1 tablet by mouth 2 (two) times daily., Disp: 20 tablet, Rfl: 0    azelastine (ASTELIN) 137 mcg (0.1 %) nasal spray, 2 sprays (274 mcg total) by Nasal route 2 (two) times daily., Disp: 120 mL, Rfl: 4  Review of patient's allergies indicates:   Allergen Reactions    Sulfa (sulfonamide antibiotics) Nausea Only    Ciprofloxacin     Januvia [sitagliptin]      abd pain    Lisinopril     Lotensin [benazepril]     Nexium [esomeprazole magnesium] Other (See Comments)     Gas       Ht 5' 2" (1.575 m)   Wt 107 kg (236 lb)   LMP  (LMP Unknown)   BMI 43.16 kg/m²     Review of Systems   Constitutional: Negative for chills and fever.   Cardiovascular: Negative for chest pain and palpitations.   Respiratory: Negative for shortness of breath and wheezing.    Skin: Negative for poor wound healing and rash.   Musculoskeletal: Positive for joint pain and stiffness.   Gastrointestinal: Negative for nausea and vomiting.   Genitourinary: Negative for dysuria and hematuria.   Neurological: Negative for seizures and tremors.   Psychiatric/Behavioral: Negative for altered mental status.   Allergic/Immunologic: Negative for environmental allergies and persistent infections.         Objective:    Ortho Exam       Left UE: Skin intact. Swelling moderate swelling int he 1st dorsal compartment, small nodule palpated. TTP nodule and extensor tendon in the 1st dorsal " compartment.. ROM full, but painful. ++ Finklestein. Sensation intact. Tinels neg. Pulses present. Cap refill brisk  GEN: Well developed, well nourished female. AAOX3. No acute distress.   Normocephalic, atraumatic.   SANCHEZ  Breathing unlabored.  Mood and affect appropriate.       Assessment:     Imaging:  I have personally reviewed and interpreted the radiographs. Xray of the left hand/wrist shows no abnormality at the wrist          1. Tendinitis, de Quervain's          Plan:           I explained my clinical impression of tendonitis and recommended CSI and bracing  Continue Voltaren topically   Activity as tolerated     No follow-ups on file.

## 2022-05-19 NOTE — PROCEDURES
Tendon Sheath    Date/Time: 5/19/2022 2:30 PM  Performed by: Rosa Ham PA-C  Authorized by: Rosa Ham PA-C     Location:  Wrist  Site:  L first doral compartment  Medications:  20 mg triamcinolone acetonide 40 mg/mL    PROCEDURE:  I have explained the risks, benefits, and alternatives of the procedure in detail.  The patient voices understanding, gives consent, and all questions have been answered.  Pause for timeout. A sterile prep of the skin performed, then the [id number] dorsal compartment is injected from the radial approach using a 25 gauge needle with a combination of 0.5 cc 1% plain xylocaine and 20 mg of Kenolog.  The remaining 20 mg of Kenolog was properly wasted.   The patient is cautioned and immediate relief of pain is secondary to the local anesthetic and will be temporary.  After the anesthetic wears off there may be a increase in pain that may last for a few hours or a few days and they should use ice to help alleviate this flair up of pain. Patient tolerated the procedure well.

## 2022-05-25 ENCOUNTER — CLINICAL SUPPORT (OUTPATIENT)
Dept: INTERNAL MEDICINE | Facility: CLINIC | Age: 72
End: 2022-05-25
Payer: MEDICARE

## 2022-05-25 DIAGNOSIS — J30.81 ALLERGY TO CATS: ICD-10-CM

## 2022-05-25 DIAGNOSIS — Z91.09 ALLERGY TO MITES: ICD-10-CM

## 2022-05-25 DIAGNOSIS — J30.1 ALLERGIC RHINITIS DUE TO POLLEN, UNSPECIFIED SEASONALITY: Primary | ICD-10-CM

## 2022-05-25 PROCEDURE — 95115 IMMUNOTHERAPY ONE INJECTION: CPT | Mod: S$GLB,,, | Performed by: FAMILY MEDICINE

## 2022-05-25 PROCEDURE — 99499 NO LOS: ICD-10-PCS | Mod: S$GLB,,, | Performed by: ALLERGY & IMMUNOLOGY

## 2022-05-25 PROCEDURE — 99999 PR PBB SHADOW E&M-EST. PATIENT-LVL I: ICD-10-PCS | Mod: PBBFAC,,,

## 2022-05-25 PROCEDURE — 95115 PR IMMUNOTHERAPY, ONE INJECTION: ICD-10-PCS | Mod: S$GLB,,, | Performed by: FAMILY MEDICINE

## 2022-05-25 PROCEDURE — 99999 PR PBB SHADOW E&M-EST. PATIENT-LVL I: CPT | Mod: PBBFAC,,,

## 2022-05-25 PROCEDURE — 99499 UNLISTED E&M SERVICE: CPT | Mod: S$GLB,,, | Performed by: ALLERGY & IMMUNOLOGY

## 2022-06-02 NOTE — PROGRESS NOTES
"Last 5 Patient Entered Readings                                                               Current 30 Day Average: 139/70     Recent Readings 1/15/2017 1/14/2017 1/14/2017 1/13/2017 1/12/2017    Systolic BP (mmHg) 138 146 151 140 141    Diastolic BP (mmHg) 68 78 83 75 67        Called Ms. Jose to introduce her into the Hypertension Digital Medicine Program.     Reviewed patient's medications and verified allergies on file.     Reviewed questionnaire:    Depression: not indicated  Sleep apnea: indicated- diagnosed on CPAP, not Elisha brand. She cannot sleep without her CPAP machine and wears it every night "religiously."     Explained that we expect her to obtain several blood pressures/week at random times of day. Also asked that the BP be taken at least 1 hour after taking BP medications.     Explained that our goal is to get  her BP to consistently below 140/90mmHg.     Patient and I agreed that the patient will take her BP daily to every other day at varying times of the day.     I will plan to follow-up with the patient in 2 weeks.    Emailed patient link to [facility] webpage as well as my direct phone number in case she has in questions.     " Subjective:       Patient ID: Farhat Trotter is a 3 y.o. male.    Chief Complaint: Diarrhea and Nasal Congestion (Patient present to clinic with diarrhea and runny nose starting yesterday. )    Nasal drainage nad diarrhea    Diarrhea  Associated symptoms include congestion. Pertinent negatives include no abdominal pain, chills, coughing, fatigue, fever, headaches, nausea, rash, sore throat or vomiting.     Review of Systems   Constitutional: Negative for activity change, appetite change, chills, fatigue, fever and irritability.   HENT: Positive for nasal congestion and rhinorrhea. Negative for ear discharge, ear pain, sneezing and sore throat.    Eyes: Negative for pain, discharge and redness.   Respiratory: Negative for cough and wheezing.    Gastrointestinal: Positive for diarrhea. Negative for abdominal pain, nausea and vomiting.   Integumentary:  Negative for rash.   Neurological: Negative for headaches.         Objective:      Physical Exam  Constitutional:       General: He is active. He is not in acute distress.     Appearance: Normal appearance. He is well-developed and normal weight.   HENT:      Head: Normocephalic.      Right Ear: Tympanic membrane, ear canal and external ear normal.      Left Ear: Tympanic membrane, ear canal and external ear normal.      Nose: Congestion and rhinorrhea present.      Mouth/Throat:      Mouth: Mucous membranes are moist.      Pharynx: Oropharynx is clear. No oropharyngeal exudate or posterior oropharyngeal erythema.   Eyes:      General:         Right eye: No discharge.         Left eye: No discharge.      Conjunctiva/sclera: Conjunctivae normal.      Pupils: Pupils are equal, round, and reactive to light.   Cardiovascular:      Rate and Rhythm: Normal rate and regular rhythm.      Pulses: Normal pulses.      Heart sounds: Normal heart sounds. No murmur heard.  Pulmonary:      Effort: Pulmonary effort is normal.      Breath sounds: Normal breath sounds. No wheezing or  rhonchi.   Abdominal:      General: Bowel sounds are normal.      Palpations: Abdomen is soft.      Tenderness: There is no abdominal tenderness. There is no guarding or rebound.   Musculoskeletal:         General: Normal range of motion.      Cervical back: Neck supple.   Lymphadenopathy:      Cervical: No cervical adenopathy.   Skin:     General: Skin is warm and dry.      Findings: No rash.   Neurological:      Mental Status: He is alert and oriented for age.            Assessment:       1. Encounter for screening laboratory testing for COVID-19 virus    2. Acute upper respiratory infection    3. Diarrhea, unspecified type        Plan:   Encounter for screening laboratory testing for COVID-19 virus  -     POCT SARS-COV2 (COVID) with Flu Antigen    Acute upper respiratory infection  -     cetirizine (ZYRTEC) 1 mg/mL syrup; Take 2.5 mLs (2.5 mg total) by mouth once daily.  Dispense: 75 mL; Refill: 0    Diarrhea, unspecified type

## 2022-06-10 ENCOUNTER — CLINICAL SUPPORT (OUTPATIENT)
Dept: INTERNAL MEDICINE | Facility: CLINIC | Age: 72
End: 2022-06-10
Payer: MEDICARE

## 2022-06-10 DIAGNOSIS — J30.81 ALLERGY TO CATS: ICD-10-CM

## 2022-06-10 DIAGNOSIS — J30.1 ALLERGIC RHINITIS DUE TO POLLEN, UNSPECIFIED SEASONALITY: Primary | ICD-10-CM

## 2022-06-10 DIAGNOSIS — Z91.09 ALLERGY TO MITES: ICD-10-CM

## 2022-06-10 PROCEDURE — 95115 IMMUNOTHERAPY ONE INJECTION: CPT | Mod: S$GLB,,, | Performed by: FAMILY MEDICINE

## 2022-06-10 PROCEDURE — 95115 PR IMMUNOTHERAPY, ONE INJECTION: ICD-10-PCS | Mod: S$GLB,,, | Performed by: FAMILY MEDICINE

## 2022-06-10 PROCEDURE — 99499 UNLISTED E&M SERVICE: CPT | Mod: S$GLB,,, | Performed by: ALLERGY & IMMUNOLOGY

## 2022-06-10 PROCEDURE — 99499 NO LOS: ICD-10-PCS | Mod: S$GLB,,, | Performed by: ALLERGY & IMMUNOLOGY

## 2022-06-10 NOTE — PROGRESS NOTES
SQ-LT UPPER ARM  ALLERGY injection red vial # 1/1 C/DM/TR/ 0.5 ml  given,tolerated well. Pt waited 30 minutes, no local reaction noted

## 2022-06-14 ENCOUNTER — PATIENT MESSAGE (OUTPATIENT)
Dept: INTERNAL MEDICINE | Facility: CLINIC | Age: 72
End: 2022-06-14
Payer: MEDICARE

## 2022-06-14 DIAGNOSIS — E11.22 TYPE 2 DIABETES MELLITUS WITH STAGE 3A CHRONIC KIDNEY DISEASE, WITH LONG-TERM CURRENT USE OF INSULIN: ICD-10-CM

## 2022-06-14 DIAGNOSIS — Z79.4 TYPE 2 DIABETES MELLITUS WITH STAGE 3A CHRONIC KIDNEY DISEASE, WITH LONG-TERM CURRENT USE OF INSULIN: ICD-10-CM

## 2022-06-14 DIAGNOSIS — N18.31 TYPE 2 DIABETES MELLITUS WITH STAGE 3A CHRONIC KIDNEY DISEASE, WITH LONG-TERM CURRENT USE OF INSULIN: ICD-10-CM

## 2022-06-14 RX ORDER — INSULIN DEGLUDEC 100 U/ML
INJECTION, SOLUTION SUBCUTANEOUS
Qty: 10 PEN | Refills: 2 | Status: SHIPPED | OUTPATIENT
Start: 2022-06-14 | End: 2023-06-27

## 2022-06-14 NOTE — TELEPHONE ENCOUNTER
No new care gaps identified.  [facility] Embedded Care Gaps. Reference number: [id number]. 6/14/2022   11:19:05 AM MACY

## 2022-06-14 NOTE — TELEPHONE ENCOUNTER
No new care gaps identified.  [facility] Embedded Care Gaps. Reference number: [id number]. 6/14/2022   11:20:42 AM TUSHART

## 2022-06-17 ENCOUNTER — HOSPITAL ENCOUNTER (OUTPATIENT)
Dept: RADIOLOGY | Facility: HOSPITAL | Age: 72
Discharge: HOME | End: 2022-06-17
Attending: INTERNAL MEDICINE
Payer: MEDICARE

## 2022-06-17 DIAGNOSIS — K74.60 HEPATIC CIRRHOSIS, UNSPECIFIED HEPATIC CIRRHOSIS TYPE, UNSPECIFIED WHETHER ASCITES PRESENT: ICD-10-CM

## 2022-06-17 PROCEDURE — 76700 US EXAM ABDOM COMPLETE: CPT | Mod: TC

## 2022-06-17 PROCEDURE — 76700 US ABDOMEN COMPLETE: ICD-10-PCS | Mod: 26,,, | Performed by: RADIOLOGY

## 2022-06-17 PROCEDURE — 76700 US EXAM ABDOM COMPLETE: CPT | Mod: 26,,, | Performed by: RADIOLOGY

## 2022-06-27 ENCOUNTER — CLINICAL SUPPORT (OUTPATIENT)
Dept: INTERNAL MEDICINE | Facility: CLINIC | Age: 72
End: 2022-06-27
Payer: MEDICARE

## 2022-06-27 DIAGNOSIS — J30.81 ALLERGY TO CATS: ICD-10-CM

## 2022-06-27 DIAGNOSIS — J30.1 ALLERGIC RHINITIS DUE TO POLLEN, UNSPECIFIED SEASONALITY: Primary | ICD-10-CM

## 2022-06-27 DIAGNOSIS — Z91.09 ALLERGY TO MITES: ICD-10-CM

## 2022-06-27 PROCEDURE — 99999 PR PBB SHADOW E&M-EST. PATIENT-LVL I: CPT | Mod: PBBFAC,,,

## 2022-06-27 PROCEDURE — 95115 PR IMMUNOTHERAPY, ONE INJECTION: ICD-10-PCS | Mod: S$GLB,,, | Performed by: FAMILY MEDICINE

## 2022-06-27 PROCEDURE — 99999 PR PBB SHADOW E&M-EST. PATIENT-LVL I: ICD-10-PCS | Mod: PBBFAC,,,

## 2022-06-27 PROCEDURE — 95115 IMMUNOTHERAPY ONE INJECTION: CPT | Mod: S$GLB,,, | Performed by: FAMILY MEDICINE

## 2022-06-27 PROCEDURE — 99499 NO LOS: ICD-10-PCS | Mod: S$GLB,,, | Performed by: ALLERGY & IMMUNOLOGY

## 2022-06-27 PROCEDURE — 99499 UNLISTED E&M SERVICE: CPT | Mod: S$GLB,,, | Performed by: ALLERGY & IMMUNOLOGY

## 2022-07-07 ENCOUNTER — OFFICE VISIT (OUTPATIENT)
Dept: ORTHOPEDICS | Facility: CLINIC | Age: 72
End: 2022-07-07
Payer: MEDICARE

## 2022-07-07 VITALS — WEIGHT: 236 LBS | BODY MASS INDEX: 43.43 KG/M2 | HEIGHT: 62 IN

## 2022-07-07 DIAGNOSIS — M65.4 DE QUERVAIN'S TENOSYNOVITIS, LEFT: ICD-10-CM

## 2022-07-07 PROCEDURE — 1101F PR PT FALLS ASSESS DOC 0-1 FALLS W/OUT INJ PAST YR: ICD-10-PCS | Mod: CPTII,S$GLB,, | Performed by: ORTHOPAEDIC SURGERY

## 2022-07-07 PROCEDURE — 1126F AMNT PAIN NOTED NONE PRSNT: CPT | Mod: CPTII,S$GLB,, | Performed by: ORTHOPAEDIC SURGERY

## 2022-07-07 PROCEDURE — 1126F PR PAIN SEVERITY QUANTIFIED, NO PAIN PRESENT: ICD-10-PCS | Mod: CPTII,S$GLB,, | Performed by: ORTHOPAEDIC SURGERY

## 2022-07-07 PROCEDURE — 1157F PR ADVANCE CARE PLAN OR EQUIV PRESENT IN MEDICAL RECORD: ICD-10-PCS | Mod: CPTII,S$GLB,, | Performed by: ORTHOPAEDIC SURGERY

## 2022-07-07 PROCEDURE — 1159F MED LIST DOCD IN RCRD: CPT | Mod: CPTII,S$GLB,, | Performed by: ORTHOPAEDIC SURGERY

## 2022-07-07 PROCEDURE — 1157F ADVNC CARE PLAN IN RCRD: CPT | Mod: CPTII,S$GLB,, | Performed by: ORTHOPAEDIC SURGERY

## 2022-07-07 PROCEDURE — 3072F PR LOW RISK FOR RETINOPATHY: ICD-10-PCS | Mod: CPTII,S$GLB,, | Performed by: ORTHOPAEDIC SURGERY

## 2022-07-07 PROCEDURE — 3008F PR BODY MASS INDEX (BMI) DOCUMENTED: ICD-10-PCS | Mod: CPTII,S$GLB,, | Performed by: ORTHOPAEDIC SURGERY

## 2022-07-07 PROCEDURE — 99999 PR PBB SHADOW E&M-EST. PATIENT-LVL II: CPT | Mod: PBBFAC,,, | Performed by: ORTHOPAEDIC SURGERY

## 2022-07-07 PROCEDURE — 99999 PR PBB SHADOW E&M-EST. PATIENT-LVL II: ICD-10-PCS | Mod: PBBFAC,,, | Performed by: ORTHOPAEDIC SURGERY

## 2022-07-07 PROCEDURE — 4010F PR ACE/ARB THEARPY RXD/TAKEN: ICD-10-PCS | Mod: CPTII,S$GLB,, | Performed by: ORTHOPAEDIC SURGERY

## 2022-07-07 PROCEDURE — 3044F HG A1C LEVEL LT 7.0%: CPT | Mod: CPTII,S$GLB,, | Performed by: ORTHOPAEDIC SURGERY

## 2022-07-07 PROCEDURE — 3288F FALL RISK ASSESSMENT DOCD: CPT | Mod: CPTII,S$GLB,, | Performed by: ORTHOPAEDIC SURGERY

## 2022-07-07 PROCEDURE — 4010F ACE/ARB THERAPY RXD/TAKEN: CPT | Mod: CPTII,S$GLB,, | Performed by: ORTHOPAEDIC SURGERY

## 2022-07-07 PROCEDURE — 3072F LOW RISK FOR RETINOPATHY: CPT | Mod: CPTII,S$GLB,, | Performed by: ORTHOPAEDIC SURGERY

## 2022-07-07 PROCEDURE — 99213 PR OFFICE/OUTPT VISIT, EST, LEVL III, 20-29 MIN: ICD-10-PCS | Mod: S$GLB,,, | Performed by: ORTHOPAEDIC SURGERY

## 2022-07-07 PROCEDURE — 3288F PR FALLS RISK ASSESSMENT DOCUMENTED: ICD-10-PCS | Mod: CPTII,S$GLB,, | Performed by: ORTHOPAEDIC SURGERY

## 2022-07-07 PROCEDURE — 3008F BODY MASS INDEX DOCD: CPT | Mod: CPTII,S$GLB,, | Performed by: ORTHOPAEDIC SURGERY

## 2022-07-07 PROCEDURE — 99213 OFFICE O/P EST LOW 20 MIN: CPT | Mod: S$GLB,,, | Performed by: ORTHOPAEDIC SURGERY

## 2022-07-07 PROCEDURE — 1159F PR MEDICATION LIST DOCUMENTED IN MEDICAL RECORD: ICD-10-PCS | Mod: CPTII,S$GLB,, | Performed by: ORTHOPAEDIC SURGERY

## 2022-07-07 PROCEDURE — 3044F PR MOST RECENT HEMOGLOBIN A1C LEVEL <7.0%: ICD-10-PCS | Mod: CPTII,S$GLB,, | Performed by: ORTHOPAEDIC SURGERY

## 2022-07-07 PROCEDURE — 1101F PT FALLS ASSESS-DOCD LE1/YR: CPT | Mod: CPTII,S$GLB,, | Performed by: ORTHOPAEDIC SURGERY

## 2022-07-07 NOTE — PROGRESS NOTES
Subjective:      Patient ID: Vivienne Jose is a 72 y.o. female.  Chief Complaint: Follow-up (Keshawn Hand )      HPI  Vivienne Jose is a  72 y.o. female presenting today for follow up of de Quervain tendinitis left wrist.  She reports that she is much improved after the previous injection last visit  No further pain reported  .    Review of patient's allergies indicates:   Allergen Reactions    Sulfa (sulfonamide antibiotics) Nausea Only    Ciprofloxacin     Januvia [sitagliptin]      abd pain    Lisinopril     Lotensin [benazepril]     Nexium [esomeprazole magnesium] Other (See Comments)     Gas         Current Outpatient Medications   Medication Sig Dispense Refill    amLODIPine (NORVASC) 5 MG tablet Take 1 tablet (5 mg total) by mouth every evening. 90 tablet 1    amoxicillin-clavulanate 875-125mg (AUGMENTIN) 875-125 mg per tablet Take 1 tablet by mouth 2 (two) times daily. 20 tablet 0    ascorbic acid, vitamin C, (VITAMIN C) 100 MG tablet Take 500 mg by mouth 2 (two) times daily.       aspirin (ECOTRIN) 81 MG EC tablet Take 81 mg by mouth once daily.      azelastine (ASTELIN) 137 mcg (0.1 %) nasal spray 2 sprays (274 mcg total) by Nasal route 2 (two) times daily. 120 mL 4    blood sugar diagnostic (TRUE METRIX GLUCOSE TEST STRIP) Strp TEST TWO TIMES DAILY 200 strip 6    blood-glucose meter Misc [employer] True Metrix Air meter 1 each 0    calcium carbonate-vitamin D3 600 mg(1,500mg) -100 unit Cap Take 1 tablet by mouth once daily.      chlorthalidone (HYGROTEN) 25 MG Tab TAKE 2 TABLETS EVERY  tablet 3    ESTRING 2 mg (7.5 mcg /24 hour) vaginal ring INSERT 1 RING VAGINALLY AS DIRECTED, CHANGE EVERY 90 DAYS 1 each 1    fluticasone propionate (FLONASE) 50 mcg/actuation nasal spray 2 sprays (100 mcg total) by Each Nostril route once daily. 48 g 4    gabapentin (NEURONTIN) 600 MG tablet Take 1 tablet (600 mg total) by mouth 2 (two) times daily. 180 tablet 1    glipiZIDE (GLUCOTROL) 5 MG tablet TAKE  "1 TABLET TWICE DAILY BEFORE A MEAL 180 tablet 3    insulin degludec (TRESIBA FLEXTOUCH U-100) 100 unit/mL (3 mL) insulin pen INJECT 38 UNITS INTO THE SKIN EVERY EVENING. 10 pen 2    ketotifen (ZADITOR) 0.025 % (0.035 %) ophthalmic solution Place 1-2 drops into both eyes once daily.      L.acid-B.bifidum-B.animal-FOS 25 billion cell -100 mg Cap Take 1 capsule by mouth once daily.      lancets (TRUEPLUS LANCETS) 28 gauge Misc Inject 1 lancet into the skin 2 (two) times daily before meals. 200 each 6    levothyroxine (SYNTHROID) 75 MCG tablet Take 1 tablet (75 mcg total) by mouth once daily. 90 tablet 1    loratadine (CLARITIN) 10 mg tablet Take 10 mg by mouth once daily.      lovastatin (MEVACOR) 40 MG tablet TAKE 1 TABLET NIGHTLY (SUBSTITUTED FOR MEVACOR) 90 tablet 2    magnesium oxide-Mg AA chelate (MG-PLUS-PROTEIN) 133 mg Tab Take by mouth as directed.      metFORMIN (GLUCOPHAGE) 1000 MG tablet Take 1 tablet (1,000 mg total) by mouth 2 (two) times daily with meals. 180 tablet 1    mv-mn/folic acid/vit [id number] (ALIVE ONCE DAILY WOMEN 50 PLUS ORAL) Take 1 tablet by mouth once daily.      omeprazole (PRILOSEC) 20 MG capsule TAKE 1 CAPSULE EVERY DAY AS NEEDED 90 capsule 0    pen needle, diabetic (BD ULTRA-FINE EDUARDO PEN NEEDLE) 32 gauge x 5/32" Ndle USE AS DIRECTED 200 each 6    potassium chloride SA (K-DUR,KLOR-CON) 10 MEQ tablet Take 3 tablets (30 meq) PO daily 270 tablet 3    semaglutide (OZEMPIC) 0.25 mg or 0.5 mg(2 mg/1.5 mL) pen injector Inject 0.5 mg into the skin every 7 days. 3 pen 4    valACYclovir (VALTREX) 500 MG tablet Take 1 tablet (500 mg total) by mouth once daily. 90 tablet 1    valsartan (DIOVAN) 160 MG tablet Take 1 tablet (160 mg total) by mouth 2 (two) times daily. 180 tablet 1     No current facility-administered medications for this visit.       Past Medical History:   Diagnosis Date    Allergy     Angio-edema     Arthritis     Cataract     bilateral - not removed    " "Cerebrovascular malformation     Cirrhosis 11/24/2020    Colon polyps     Coronary artery disease     Diabetes mellitus, type 2     Diabetic peripheral neuropathy     GERD (gastroesophageal reflux disease)     Herpes infection     Hyperlipidemia     Hypertension     Hypothyroidism     Kidney stones     Mild nonproliferative diabetic retinopathy of 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 edema associated with type 2 diabetes mellitus 4/18/2019    Nausea & vomiting 11/23/2015    Obesity, morbid     Ovarian cyst     Seizure disorder, focal motor     Sleep apnea     Type II or unspecified type diabetes mellitus with neurological manifestations, uncontrolled(250.62)     Urticaria        Past Surgical History:   Procedure Laterality Date    CARPAL TUNNEL RELEASE Right 2014    COLONOSCOPY      COLONOSCOPY N/A 9/13/2017    Procedure: COLONOSCOPY Golytely;  Surgeon: Gisela Wall MD;  [facility];  Service: Endoscopy;  Laterality: N/A;    CYST REMOVAL      skin; multiples    ESOPHAGOGASTRODUODENOSCOPY Left 10/15/2021    Procedure: EGD (ESOPHAGOGASTRODUODENOSCOPY);  Surgeon: Luis Fernando Taveras MD;  Location: [facility] ([address] [place] [address]);  Service: Endoscopy;  Laterality: Left;  cirrhosis labwork am of procedure  last seizure 1973  C[facility] 10/12/21 [facility]    EXTRACORPOREAL SHOCK WAVE LITHOTRIPSY  2002    HYSTERECTOMY  1984    JOINT REPLACEMENT Right 03/06/2019    knee    KIDNEY STONE SURGERY      KNEE ARTHROPLASTY Right 3/6/2019    Procedure: ARTHROPLASTY, KNEE;  Surgeon: Pj Gamboa MD;  Location: [facility];  Service: Orthopedics;  Laterality: Right;  Depuy (Stephen notified 2/21, CC)    THYROIDECTOMY  1977         TONSILLECTOMY, ADENOIDECTOMY      TRIGGER FINGER RELEASE      TUBAL LIGATION         OBJECTIVE:   PHYSICAL EXAM:  Height: 5' 2" (157.5 cm) Weight: 107 kg (236 lb)  Vitals:    07/07/22 1517   Weight: 107 kg (236 lb)   Height: 5' 2" (1.575 m)   PainSc: 0-No pain     Ortho/SPM " Exam  Examination left wrist no tenderness no swelling  Finkelstein test negative  Range of motion wrist fingers full   strength slightly decreased sensation intact    RADIOGRAPHS:    Comments: I have personally reviewed the imaging and I agree with the above radiologist's report.    ASSESSMENT/PLAN:     IMPRESSION:  De Quervain tendinitis left wrist improved    PLAN:  I do want her to wear the brace for any lifting otherwise keep an eye on symptoms    FOLLOW UP:  As needed    Disclaimer: This note has been generated using voice-recognition software. There may be typographical errors that have been missed during proof-reading.

## 2022-07-18 ENCOUNTER — CLINICAL SUPPORT (OUTPATIENT)
Dept: INTERNAL MEDICINE | Facility: CLINIC | Age: 72
End: 2022-07-18
Payer: MEDICARE

## 2022-07-18 DIAGNOSIS — J30.81 ALLERGY TO CATS: ICD-10-CM

## 2022-07-18 DIAGNOSIS — Z91.09 ALLERGY TO MITES: ICD-10-CM

## 2022-07-18 DIAGNOSIS — J30.1 ALLERGIC RHINITIS DUE TO POLLEN, UNSPECIFIED SEASONALITY: Primary | ICD-10-CM

## 2022-07-18 PROCEDURE — 99499 UNLISTED E&M SERVICE: CPT | Mod: S$GLB,,, | Performed by: ALLERGY & IMMUNOLOGY

## 2022-07-18 PROCEDURE — 95115 PR IMMUNOTHERAPY, ONE INJECTION: ICD-10-PCS | Mod: S$GLB,,, | Performed by: FAMILY MEDICINE

## 2022-07-18 PROCEDURE — 95115 IMMUNOTHERAPY ONE INJECTION: CPT | Mod: S$GLB,,, | Performed by: FAMILY MEDICINE

## 2022-07-18 PROCEDURE — 99499 NO LOS: ICD-10-PCS | Mod: S$GLB,,, | Performed by: ALLERGY & IMMUNOLOGY

## 2022-07-19 ENCOUNTER — PATIENT MESSAGE (OUTPATIENT)
Dept: RESEARCH | Facility: CLINIC | Age: 72
End: 2022-07-19
Payer: MEDICARE

## 2022-07-22 ENCOUNTER — OFFICE VISIT (OUTPATIENT)
Dept: URGENT CARE | Facility: CLINIC | Age: 72
End: 2022-07-22
Payer: MEDICARE

## 2022-07-22 VITALS
BODY MASS INDEX: 43.61 KG/M2 | HEIGHT: 62 IN | HEART RATE: 73 BPM | TEMPERATURE: 98 F | RESPIRATION RATE: 18 BRPM | DIASTOLIC BLOOD PRESSURE: 75 MMHG | OXYGEN SATURATION: 97 % | SYSTOLIC BLOOD PRESSURE: 173 MMHG | WEIGHT: 237 LBS

## 2022-07-22 DIAGNOSIS — Z11.52 ENCOUNTER FOR SCREENING FOR COVID-19: Primary | ICD-10-CM

## 2022-07-22 DIAGNOSIS — R10.2 PELVIC PAIN: ICD-10-CM

## 2022-07-22 DIAGNOSIS — R30.0 DYSURIA: ICD-10-CM

## 2022-07-22 DIAGNOSIS — J06.9 UPPER RESPIRATORY TRACT INFECTION, UNSPECIFIED TYPE: ICD-10-CM

## 2022-07-22 LAB
BILIRUB UR QL STRIP: NEGATIVE
CTP QC/QA: YES
GLUCOSE UR QL STRIP: NEGATIVE
KETONES UR QL STRIP: NEGATIVE
LEUKOCYTE ESTERASE UR QL STRIP: NEGATIVE
PH, POC UA: 7.5 (ref 5–8)
POC BLOOD, URINE: NEGATIVE
POC NITRATES, URINE: NEGATIVE
PROT UR QL STRIP: NEGATIVE
SARS-COV-2 RDRP RESP QL NAA+PROBE: NEGATIVE
SP GR UR STRIP: 1 (ref 1–1.03)
UROBILINOGEN UR STRIP-ACNC: NORMAL (ref 0.1–1.1)

## 2022-07-22 PROCEDURE — 3044F HG A1C LEVEL LT 7.0%: CPT | Mod: CPTII,S$GLB,, | Performed by: NURSE PRACTITIONER

## 2022-07-22 PROCEDURE — 81003 URINALYSIS AUTO W/O SCOPE: CPT | Mod: QW,S$GLB,, | Performed by: NURSE PRACTITIONER

## 2022-07-22 PROCEDURE — 1126F PR PAIN SEVERITY QUANTIFIED, NO PAIN PRESENT: ICD-10-PCS | Mod: CPTII,S$GLB,, | Performed by: NURSE PRACTITIONER

## 2022-07-22 PROCEDURE — 3008F BODY MASS INDEX DOCD: CPT | Mod: CPTII,S$GLB,, | Performed by: NURSE PRACTITIONER

## 2022-07-22 PROCEDURE — 3078F PR MOST RECENT DIASTOLIC BLOOD PRESSURE < 80 MM HG: ICD-10-PCS | Mod: CPTII,S$GLB,, | Performed by: NURSE PRACTITIONER

## 2022-07-22 PROCEDURE — U0002 COVID-19 LAB TEST NON-CDC: HCPCS | Mod: QW,S$GLB,, | Performed by: NURSE PRACTITIONER

## 2022-07-22 PROCEDURE — 87086 URINE CULTURE/COLONY COUNT: CPT | Performed by: NURSE PRACTITIONER

## 2022-07-22 PROCEDURE — 1157F ADVNC CARE PLAN IN RCRD: CPT | Mod: CPTII,S$GLB,, | Performed by: NURSE PRACTITIONER

## 2022-07-22 PROCEDURE — 1157F PR ADVANCE CARE PLAN OR EQUIV PRESENT IN MEDICAL RECORD: ICD-10-PCS | Mod: CPTII,S$GLB,, | Performed by: NURSE PRACTITIONER

## 2022-07-22 PROCEDURE — 4010F ACE/ARB THERAPY RXD/TAKEN: CPT | Mod: CPTII,S$GLB,, | Performed by: NURSE PRACTITIONER

## 2022-07-22 PROCEDURE — 3078F DIAST BP <80 MM HG: CPT | Mod: CPTII,S$GLB,, | Performed by: NURSE PRACTITIONER

## 2022-07-22 PROCEDURE — 3044F PR MOST RECENT HEMOGLOBIN A1C LEVEL <7.0%: ICD-10-PCS | Mod: CPTII,S$GLB,, | Performed by: NURSE PRACTITIONER

## 2022-07-22 PROCEDURE — 4010F PR ACE/ARB THEARPY RXD/TAKEN: ICD-10-PCS | Mod: CPTII,S$GLB,, | Performed by: NURSE PRACTITIONER

## 2022-07-22 PROCEDURE — 99214 PR OFFICE/OUTPT VISIT, EST, LEVL IV, 30-39 MIN: ICD-10-PCS | Mod: S$GLB,,, | Performed by: NURSE PRACTITIONER

## 2022-07-22 PROCEDURE — 3077F PR MOST RECENT SYSTOLIC BLOOD PRESSURE >= 140 MM HG: ICD-10-PCS | Mod: CPTII,S$GLB,, | Performed by: NURSE PRACTITIONER

## 2022-07-22 PROCEDURE — 1159F MED LIST DOCD IN RCRD: CPT | Mod: CPTII,S$GLB,, | Performed by: NURSE PRACTITIONER

## 2022-07-22 PROCEDURE — 3008F PR BODY MASS INDEX (BMI) DOCUMENTED: ICD-10-PCS | Mod: CPTII,S$GLB,, | Performed by: NURSE PRACTITIONER

## 2022-07-22 PROCEDURE — 99214 OFFICE O/P EST MOD 30 MIN: CPT | Mod: S$GLB,,, | Performed by: NURSE PRACTITIONER

## 2022-07-22 PROCEDURE — U0002: ICD-10-PCS | Mod: QW,S$GLB,, | Performed by: NURSE PRACTITIONER

## 2022-07-22 PROCEDURE — 81003 POCT URINALYSIS, DIPSTICK, AUTOMATED, W/O SCOPE: ICD-10-PCS | Mod: QW,S$GLB,, | Performed by: NURSE PRACTITIONER

## 2022-07-22 PROCEDURE — 3072F LOW RISK FOR RETINOPATHY: CPT | Mod: CPTII,S$GLB,, | Performed by: NURSE PRACTITIONER

## 2022-07-22 PROCEDURE — 3072F PR LOW RISK FOR RETINOPATHY: ICD-10-PCS | Mod: CPTII,S$GLB,, | Performed by: NURSE PRACTITIONER

## 2022-07-22 PROCEDURE — 3077F SYST BP >= 140 MM HG: CPT | Mod: CPTII,S$GLB,, | Performed by: NURSE PRACTITIONER

## 2022-07-22 PROCEDURE — 1160F RVW MEDS BY RX/DR IN RCRD: CPT | Mod: CPTII,S$GLB,, | Performed by: NURSE PRACTITIONER

## 2022-07-22 PROCEDURE — 1159F PR MEDICATION LIST DOCUMENTED IN MEDICAL RECORD: ICD-10-PCS | Mod: CPTII,S$GLB,, | Performed by: NURSE PRACTITIONER

## 2022-07-22 PROCEDURE — 1160F PR REVIEW ALL MEDS BY PRESCRIBER/CLIN PHARMACIST DOCUMENTED: ICD-10-PCS | Mod: CPTII,S$GLB,, | Performed by: NURSE PRACTITIONER

## 2022-07-22 PROCEDURE — 1126F AMNT PAIN NOTED NONE PRSNT: CPT | Mod: CPTII,S$GLB,, | Performed by: NURSE PRACTITIONER

## 2022-07-22 NOTE — PROGRESS NOTES
"Subjective:       Patient ID: Vivienne Jose is a 72 y.o. female.    Vitals:  height is 5' 2" (1.575 m) and weight is 107.5 kg (237 lb). Her oral temperature is 98.3 °F (36.8 °C). Her blood pressure is 173/75 (abnormal) and her pulse is 73. Her respiration is 18 and oxygen saturation is 97%.     Chief Complaint: Sinus Problem    73yo female pt presents with .  Reports urinary and lower pelvic pain that started this week, approx 4 days ago.  Reports that she has frequent UTIs, under care of PCP and urology, reports that she takes probiotic medications to help reduce symptoms.  Reports that symptoms improved after taking Macrobid x2 days.  Denies changes to urine appearance, denies current abd/back/flank pain.  Reports intermittent urgency that has resolved.    Reports that she is also having residual fullness to BL ears and scratchy throat since having "cold" last week.  Reports that co-worker was sick prior to symptom onset.   tested positive for COVID today.  Denies fever/chills, denies cough, denies chest pain or SOB, denies n/v/d.    Sinus Problem  This is a new problem. The current episode started in the past 7 days (Monday). The problem has been gradually worsening since onset. There has been no fever. Her pain is at a severity of 0/10. She is experiencing no pain. Associated symptoms include headaches, sinus pressure and a sore throat. Pertinent negatives include no chills, congestion, coughing, diaphoresis, ear pain, hoarse voice, neck pain, shortness of breath, sneezing or swollen glands. Treatments tried: flonase, allergy medication. The treatment provided mild relief.       Constitution: Negative for chills and sweating.   HENT: Positive for postnasal drip, sinus pressure and sore throat. Negative for ear pain and congestion.    Neck: Negative for neck pain.   Respiratory: Negative for cough and shortness of breath.    Allergic/Immunologic: Negative for sneezing.   Neurological: Positive for " headaches.       Objective:      Physical Exam   Constitutional: She is oriented to person, place, and time. She appears well-developed. She is cooperative.  Non-toxic appearance. She does not appear ill. No distress.   HENT:   Head: Normocephalic and atraumatic.   Ears:   Right Ear: Hearing, external ear and ear canal normal. Tympanic membrane is retracted (dull TM). Tympanic membrane is not erythematous and not bulging. A middle ear effusion (clear fluid) is present.   Left Ear: Hearing, external ear and ear canal normal. Tympanic membrane is retracted (dull TM). Tympanic membrane is not erythematous and not bulging. A middle ear effusion (clear fluid) is present.   Nose: Rhinorrhea (clear to BL nares) present. No mucosal edema, purulent discharge or nasal deformity. No epistaxis. Right sinus exhibits no maxillary sinus tenderness and no frontal sinus tenderness. Left sinus exhibits no maxillary sinus tenderness and no frontal sinus tenderness.   Mouth/Throat: Uvula is midline and mucous membranes are normal. No trismus in the jaw. Normal dentition. No uvula swelling. Oropharyngeal exudate (clear postnasal drip) present. No posterior oropharyngeal edema or posterior oropharyngeal erythema. Tonsils are 1+ on the right. Tonsils are 1+ on the left. No tonsillar exudate.   Eyes: Conjunctivae and lids are normal. No scleral icterus.   Neck: Trachea normal and phonation normal. Neck supple. No edema present. No erythema present. No neck rigidity present.   Cardiovascular: Normal rate, regular rhythm, normal heart sounds and normal pulses.   Pulmonary/Chest: Effort normal and breath sounds normal. No accessory muscle usage or stridor. No tachypnea. No respiratory distress. She has no decreased breath sounds. She has no wheezes. She has no rhonchi. She has no rales.   Abdominal: Normal appearance and bowel sounds are normal. Soft. There is no abdominal tenderness. There is no guarding, no left CVA tenderness and no right  CVA tenderness.   Musculoskeletal: Normal range of motion.         General: No deformity. Normal range of motion.   Lymphadenopathy:        Head (right side): No submandibular adenopathy present.        Head (left side): No submandibular adenopathy present.     She has no cervical adenopathy.   Neurological: She is alert and oriented to person, place, and time. She exhibits normal muscle tone. Coordination normal.   Skin: Skin is warm, dry, intact, not diaphoretic and not pale.   Psychiatric: Her speech is normal and behavior is normal. Judgment and thought content normal.   Nursing note and vitals reviewed.    Results for orders placed or performed in visit on 07/22/22   POCT COVID-19 Rapid Screening   Result Value Ref Range    POC Rapid COVID Negative Negative     Acceptable Yes    POCT Urinalysis, Dipstick, Automated, W/O Scope   Result Value Ref Range    POC Blood, Urine Negative Negative    POC Bilirubin, Urine Negative Negative    POC Urobilinogen, Urine Normal 0.1 - 1.1    POC Ketones, Urine Negative Negative    POC Protein, Urine Negative Negative    POC Nitrates, Urine Negative Negative    POC Glucose, Urine Negative Negative    pH, UA 7.5 5 - 8    POC Specific Gravity, Urine 1.005 1.003 - 1.029    POC Leukocytes, Urine Negative Negative     *Note: Due to a large number of results and/or encounters for the requested time period, some results have not been displayed. A complete set of results can be found in Results Review.           Assessment:       1. Encounter for screening for COVID-19    2. Pelvic pain    3. Upper respiratory tract infection, unspecified type    4. Dysuria          Plan:       Provided education to pt to use OTC Claritin/Zyrtec and Flonase nasal spray to help with residual symptoms, recommended isolation due to  testing positive for COVID.  Sent urine for culture, will notify with results.  Provided education on inappropriate antibiotic use and recommended pt stop  "Macrobid until culture results.  Recommended follow-up with PCP or urology.  Pt verbalized understanding and agreed to plan.    Encounter for screening for COVID-19  -     POCT COVID-19 Rapid Screening    Pelvic pain  -     POCT Urinalysis, Dipstick, Automated, W/O Scope    Upper respiratory tract infection, unspecified type    Dysuria  -     Urine culture      Patient Instructions   If your condition worsens or fails to improve, we recommend that you receive another evaluation at the ER immediately contact your PCP to discuss your concerns, or return here.  You must understand that you've received an urgent care treatment only, and that you may be released before all your medical problems are known or treated.  You, the patient, will arrange for follow-up care as instructed.     Flonase (fluticasone) is a nasal spray which is available over the counter and may help with your symptoms.  Zyrtec D, Claritin D, or Allegra D can also help with symptoms of congestion and drainage.  If you have hypertension, avoid using the "D" which is the decongestant formula.    If you just have drainage, you can take plain Zyrtec, Claritin, or Allegra.  If you just have a congested feeling, you can take pseudoephedrine (unless you have high blood pressure), which you have to sign for behind the counter.  Do not buy phenylephrine OTC, as it is not effective.    Rest and fluids are also important.  Tylenol or ibuprofen can also be used as directed for pain, unless you have an allergy to them or medical condition (such as stomach ulcers, kidney or liver disease, or use blood thinners, etc.) for which you should not be taking these type of medications.     If you are flying in the next few days, Afrin nose drops for the airplane flight upon take off and landing may help.  Other than at those times, refrain from using Afrin.     If you were prescribed a narcotic or sedating cough medicine, do not drive or operate heavy machinery while " taking these medications.     -----    If your condition worsens or fails to improve, we recommend that you receive another evaluation at the ER immediately, contact your PCP to discuss your concerns, or return here.  You must understand that you've received an urgent care treatment only, and that you may be released before all your medical problems are known or treated.  You, the patient, will arrange for follow-up care as instructed.     If you had cultures done, it will take 3-5 business days to result.  We will call you with the result.     If you are are female and on birth control pills, use additional methods to prevent pregnancy while on antibiotics and for one cycle after.     Cranberry juice may help.  Get the 100% cranberry juice, mix 4 oz. of juice with 4 oz. of water, and drink this 8 oz. glass of liquid once a day.  Increase water intake to at least 8-10 glasses/day.    Do not wear contacts with Pyridium as it will stain them.  You may want to wear a panty liner with Pyridium as it may stain your underwear.    Avoid caffeine, alcohol, or spicy foods as they irritate the bladder.

## 2022-07-22 NOTE — PATIENT INSTRUCTIONS
"If your condition worsens or fails to improve, we recommend that you receive another evaluation at the ER immediately contact your PCP to discuss your concerns, or return here.  You must understand that you've received an urgent care treatment only, and that you may be released before all your medical problems are known or treated.  You, the patient, will arrange for follow-up care as instructed.     Flonase (fluticasone) is a nasal spray which is available over the counter and may help with your symptoms.  Zyrtec D, Claritin D, or Allegra D can also help with symptoms of congestion and drainage.  If you have hypertension, avoid using the "D" which is the decongestant formula.    If you just have drainage, you can take plain Zyrtec, Claritin, or Allegra.  If you just have a congested feeling, you can take pseudoephedrine (unless you have high blood pressure), which you have to sign for behind the counter.  Do not buy phenylephrine OTC, as it is not effective.    Rest and fluids are also important.  Tylenol or ibuprofen can also be used as directed for pain, unless you have an allergy to them or medical condition (such as stomach ulcers, kidney or liver disease, or use blood thinners, etc.) for which you should not be taking these type of medications.     If you are flying in the next few days, Afrin nose drops for the airplane flight upon take off and landing may help.  Other than at those times, refrain from using Afrin.     If you were prescribed a narcotic or sedating cough medicine, do not drive or operate heavy machinery while taking these medications.     -----    If your condition worsens or fails to improve, we recommend that you receive another evaluation at the ER immediately, contact your PCP to discuss your concerns, or return here.  You must understand that you've received an urgent care treatment only, and that you may be released before all your medical problems are known or treated.  You, the " patient, will arrange for follow-up care as instructed.     If you had cultures done, it will take 3-5 business days to result.  We will call you with the result.     If you are are female and on birth control pills, use additional methods to prevent pregnancy while on antibiotics and for one cycle after.     Cranberry juice may help.  Get the 100% cranberry juice, mix 4 oz. of juice with 4 oz. of water, and drink this 8 oz. glass of liquid once a day.  Increase water intake to at least 8-10 glasses/day.    Do not wear contacts with Pyridium as it will stain them.  You may want to wear a panty liner with Pyridium as it may stain your underwear.    Avoid caffeine, alcohol, or spicy foods as they irritate the bladder.

## 2022-07-24 LAB — BACTERIA UR CULT: NORMAL

## 2022-07-25 ENCOUNTER — TELEPHONE (OUTPATIENT)
Dept: URGENT CARE | Facility: CLINIC | Age: 72
End: 2022-07-25
Payer: MEDICARE

## 2022-07-25 NOTE — TELEPHONE ENCOUNTER
Notified pt of urine culture results. Pt states symptoms are still present, but not unbearable. Sees pcp in 2 weeks, will address at that time if symptoms still present.

## 2022-08-04 ENCOUNTER — LAB VISIT (OUTPATIENT)
Dept: LAB | Facility: HOSPITAL | Age: 72
End: 2022-08-04
Attending: INTERNAL MEDICINE
Payer: MEDICARE

## 2022-08-04 DIAGNOSIS — R79.89 ABNORMAL CBC: ICD-10-CM

## 2022-08-04 DIAGNOSIS — N18.31 TYPE 2 DIABETES MELLITUS WITH STAGE 3A CHRONIC KIDNEY DISEASE, WITH LONG-TERM CURRENT USE OF INSULIN: ICD-10-CM

## 2022-08-04 DIAGNOSIS — E11.22 TYPE 2 DIABETES MELLITUS WITH STAGE 3A CHRONIC KIDNEY DISEASE, WITH LONG-TERM CURRENT USE OF INSULIN: ICD-10-CM

## 2022-08-04 DIAGNOSIS — K74.60 HEPATIC CIRRHOSIS, UNSPECIFIED HEPATIC CIRRHOSIS TYPE, UNSPECIFIED WHETHER ASCITES PRESENT: ICD-10-CM

## 2022-08-04 DIAGNOSIS — Z79.4 TYPE 2 DIABETES MELLITUS WITH STAGE 3A CHRONIC KIDNEY DISEASE, WITH LONG-TERM CURRENT USE OF INSULIN: ICD-10-CM

## 2022-08-04 LAB
AFP SERPL-MCNC: 8.3 NG/ML (ref 0–8.4)
ALBUMIN SERPL BCP-MCNC: 3.3 G/DL (ref 3.5–5.2)
ALBUMIN SERPL BCP-MCNC: 3.3 G/DL (ref 3.5–5.2)
ALP SERPL-CCNC: 75 U/L (ref 55–135)
ALP SERPL-CCNC: 75 U/L (ref 55–135)
ALT SERPL W/O P-5'-P-CCNC: 26 U/L (ref 10–44)
ALT SERPL W/O P-5'-P-CCNC: 26 U/L (ref 10–44)
ANION GAP SERPL CALC-SCNC: 11 MMOL/L (ref 8–16)
ANION GAP SERPL CALC-SCNC: 11 MMOL/L (ref 8–16)
AST SERPL-CCNC: 19 U/L (ref 10–40)
AST SERPL-CCNC: 19 U/L (ref 10–40)
BASOPHILS # BLD AUTO: 0.04 K/UL (ref 0–0.2)
BASOPHILS # BLD AUTO: 0.04 K/UL (ref 0–0.2)
BASOPHILS NFR BLD: 0.4 % (ref 0–1.9)
BASOPHILS NFR BLD: 0.4 % (ref 0–1.9)
BILIRUB SERPL-MCNC: 0.5 MG/DL (ref 0.1–1)
BILIRUB SERPL-MCNC: 0.5 MG/DL (ref 0.1–1)
BUN SERPL-MCNC: 14 MG/DL (ref 8–23)
BUN SERPL-MCNC: 14 MG/DL (ref 8–23)
CALCIUM SERPL-MCNC: 10.1 MG/DL (ref 8.7–10.5)
CALCIUM SERPL-MCNC: 10.1 MG/DL (ref 8.7–10.5)
CHLORIDE SERPL-SCNC: 104 MMOL/L (ref 95–110)
CHLORIDE SERPL-SCNC: 104 MMOL/L (ref 95–110)
CO2 SERPL-SCNC: 29 MMOL/L (ref 23–29)
CO2 SERPL-SCNC: 29 MMOL/L (ref 23–29)
CREAT SERPL-MCNC: 0.8 MG/DL (ref 0.5–1.4)
CREAT SERPL-MCNC: 0.8 MG/DL (ref 0.5–1.4)
DIFFERENTIAL METHOD: ABNORMAL
DIFFERENTIAL METHOD: ABNORMAL
EOSINOPHIL # BLD AUTO: 0.3 K/UL (ref 0–0.5)
EOSINOPHIL # BLD AUTO: 0.3 K/UL (ref 0–0.5)
EOSINOPHIL NFR BLD: 3.4 % (ref 0–8)
EOSINOPHIL NFR BLD: 3.4 % (ref 0–8)
ERYTHROCYTE [DISTWIDTH] IN BLOOD BY AUTOMATED COUNT: 14.5 % (ref 11.5–14.5)
ERYTHROCYTE [DISTWIDTH] IN BLOOD BY AUTOMATED COUNT: 14.5 % (ref 11.5–14.5)
EST. GFR  (NO RACE VARIABLE): >60 ML/MIN/1.73 M^2
EST. GFR  (NO RACE VARIABLE): >60 ML/MIN/1.73 M^2
ESTIMATED AVG GLUCOSE: 128 MG/DL (ref 68–131)
GLUCOSE SERPL-MCNC: 113 MG/DL (ref 70–110)
GLUCOSE SERPL-MCNC: 113 MG/DL (ref 70–110)
HBA1C MFR BLD: 6.1 % (ref 4–5.6)
HCT VFR BLD AUTO: 41.1 % (ref 37–48.5)
HCT VFR BLD AUTO: 41.1 % (ref 37–48.5)
HGB BLD-MCNC: 13.9 G/DL (ref 12–16)
HGB BLD-MCNC: 13.9 G/DL (ref 12–16)
IMM GRANULOCYTES # BLD AUTO: 0.03 K/UL (ref 0–0.04)
IMM GRANULOCYTES # BLD AUTO: 0.03 K/UL (ref 0–0.04)
IMM GRANULOCYTES NFR BLD AUTO: 0.3 % (ref 0–0.5)
IMM GRANULOCYTES NFR BLD AUTO: 0.3 % (ref 0–0.5)
INR PPP: 1 (ref 0.8–1.2)
LYMPHOCYTES # BLD AUTO: 2.4 K/UL (ref 1–4.8)
LYMPHOCYTES # BLD AUTO: 2.4 K/UL (ref 1–4.8)
LYMPHOCYTES NFR BLD: 26.3 % (ref 18–48)
LYMPHOCYTES NFR BLD: 26.3 % (ref 18–48)
MCH RBC QN AUTO: 32.6 PG (ref 27–31)
MCH RBC QN AUTO: 32.6 PG (ref 27–31)
MCHC RBC AUTO-ENTMCNC: 33.8 G/DL (ref 32–36)
MCHC RBC AUTO-ENTMCNC: 33.8 G/DL (ref 32–36)
MCV RBC AUTO: 96 FL (ref 82–98)
MCV RBC AUTO: 96 FL (ref 82–98)
MONOCYTES # BLD AUTO: 0.8 K/UL (ref 0.3–1)
MONOCYTES # BLD AUTO: 0.8 K/UL (ref 0.3–1)
MONOCYTES NFR BLD: 8.8 % (ref 4–15)
MONOCYTES NFR BLD: 8.8 % (ref 4–15)
NEUTROPHILS # BLD AUTO: 5.5 K/UL (ref 1.8–7.7)
NEUTROPHILS # BLD AUTO: 5.5 K/UL (ref 1.8–7.7)
NEUTROPHILS NFR BLD: 60.8 % (ref 38–73)
NEUTROPHILS NFR BLD: 60.8 % (ref 38–73)
NRBC BLD-RTO: 0 /100 WBC
NRBC BLD-RTO: 0 /100 WBC
PLATELET # BLD AUTO: 275 K/UL (ref 150–450)
PLATELET # BLD AUTO: 275 K/UL (ref 150–450)
PMV BLD AUTO: 10 FL (ref 9.2–12.9)
PMV BLD AUTO: 10 FL (ref 9.2–12.9)
POTASSIUM SERPL-SCNC: 3.8 MMOL/L (ref 3.5–5.1)
POTASSIUM SERPL-SCNC: 3.8 MMOL/L (ref 3.5–5.1)
PROT SERPL-MCNC: 7.3 G/DL (ref 6–8.4)
PROT SERPL-MCNC: 7.3 G/DL (ref 6–8.4)
PROTHROMBIN TIME: 10.6 SEC (ref 9–12.5)
RBC # BLD AUTO: 4.27 M/UL (ref 4–5.4)
RBC # BLD AUTO: 4.27 M/UL (ref 4–5.4)
SODIUM SERPL-SCNC: 144 MMOL/L (ref 136–145)
SODIUM SERPL-SCNC: 144 MMOL/L (ref 136–145)
WBC # BLD AUTO: 9.06 K/UL (ref 3.9–12.7)
WBC # BLD AUTO: 9.06 K/UL (ref 3.9–12.7)

## 2022-08-04 PROCEDURE — 85025 COMPLETE CBC W/AUTO DIFF WBC: CPT | Performed by: INTERNAL MEDICINE

## 2022-08-04 PROCEDURE — 36415 COLL VENOUS BLD VENIPUNCTURE: CPT | Mod: PO | Performed by: INTERNAL MEDICINE

## 2022-08-04 PROCEDURE — 83036 HEMOGLOBIN GLYCOSYLATED A1C: CPT | Performed by: INTERNAL MEDICINE

## 2022-08-04 PROCEDURE — 80053 COMPREHEN METABOLIC PANEL: CPT | Performed by: INTERNAL MEDICINE

## 2022-08-04 PROCEDURE — 85610 PROTHROMBIN TIME: CPT | Performed by: INTERNAL MEDICINE

## 2022-08-04 PROCEDURE — 82105 ALPHA-FETOPROTEIN SERUM: CPT | Performed by: INTERNAL MEDICINE

## 2022-08-11 ENCOUNTER — OFFICE VISIT (OUTPATIENT)
Dept: INTERNAL MEDICINE | Facility: CLINIC | Age: 72
End: 2022-08-11
Payer: MEDICARE

## 2022-08-11 ENCOUNTER — LAB VISIT (OUTPATIENT)
Dept: LAB | Facility: HOSPITAL | Age: 72
End: 2022-08-11
Attending: INTERNAL MEDICINE
Payer: MEDICARE

## 2022-08-11 VITALS
DIASTOLIC BLOOD PRESSURE: 60 MMHG | WEIGHT: 245.38 LBS | HEART RATE: 65 BPM | HEIGHT: 62 IN | OXYGEN SATURATION: 98 % | SYSTOLIC BLOOD PRESSURE: 134 MMHG | BODY MASS INDEX: 45.15 KG/M2

## 2022-08-11 DIAGNOSIS — Z79.4 TYPE 2 DIABETES MELLITUS WITH STAGE 3A CHRONIC KIDNEY DISEASE, WITH LONG-TERM CURRENT USE OF INSULIN: ICD-10-CM

## 2022-08-11 DIAGNOSIS — N18.31 TYPE 2 DIABETES MELLITUS WITH STAGE 3A CHRONIC KIDNEY DISEASE, WITH LONG-TERM CURRENT USE OF INSULIN: ICD-10-CM

## 2022-08-11 DIAGNOSIS — E78.5 HYPERLIPIDEMIA ASSOCIATED WITH TYPE 2 DIABETES MELLITUS: ICD-10-CM

## 2022-08-11 DIAGNOSIS — E11.22 TYPE 2 DIABETES MELLITUS WITH STAGE 3A CHRONIC KIDNEY DISEASE, WITH LONG-TERM CURRENT USE OF INSULIN: ICD-10-CM

## 2022-08-11 DIAGNOSIS — K74.60 HEPATIC CIRRHOSIS, UNSPECIFIED HEPATIC CIRRHOSIS TYPE, UNSPECIFIED WHETHER ASCITES PRESENT: ICD-10-CM

## 2022-08-11 DIAGNOSIS — R25.2 MUSCLE CRAMP: ICD-10-CM

## 2022-08-11 DIAGNOSIS — I15.2 HYPERTENSION ASSOCIATED WITH DIABETES: ICD-10-CM

## 2022-08-11 DIAGNOSIS — T14.8XXA MUSCLE STRAIN: ICD-10-CM

## 2022-08-11 DIAGNOSIS — Z12.31 SCREENING MAMMOGRAM FOR BREAST CANCER: ICD-10-CM

## 2022-08-11 DIAGNOSIS — E03.9 HYPOTHYROIDISM, UNSPECIFIED TYPE: ICD-10-CM

## 2022-08-11 DIAGNOSIS — E11.69 HYPERLIPIDEMIA ASSOCIATED WITH TYPE 2 DIABETES MELLITUS: ICD-10-CM

## 2022-08-11 DIAGNOSIS — M21.70 LEG LENGTH DISCREPANCY: ICD-10-CM

## 2022-08-11 DIAGNOSIS — E11.59 HYPERTENSION ASSOCIATED WITH DIABETES: ICD-10-CM

## 2022-08-11 DIAGNOSIS — Z12.11 ENCOUNTER FOR SCREENING COLONOSCOPY: ICD-10-CM

## 2022-08-11 PROCEDURE — 1157F PR ADVANCE CARE PLAN OR EQUIV PRESENT IN MEDICAL RECORD: ICD-10-PCS | Mod: CPTII,S$GLB,, | Performed by: INTERNAL MEDICINE

## 2022-08-11 PROCEDURE — 4010F ACE/ARB THERAPY RXD/TAKEN: CPT | Mod: CPTII,S$GLB,, | Performed by: INTERNAL MEDICINE

## 2022-08-11 PROCEDURE — 1101F PT FALLS ASSESS-DOCD LE1/YR: CPT | Mod: CPTII,S$GLB,, | Performed by: INTERNAL MEDICINE

## 2022-08-11 PROCEDURE — 99999 PR PBB SHADOW E&M-EST. PATIENT-LVL V: ICD-10-PCS | Mod: PBBFAC,,, | Performed by: INTERNAL MEDICINE

## 2022-08-11 PROCEDURE — 1157F ADVNC CARE PLAN IN RCRD: CPT | Mod: CPTII,S$GLB,, | Performed by: INTERNAL MEDICINE

## 2022-08-11 PROCEDURE — 1125F AMNT PAIN NOTED PAIN PRSNT: CPT | Mod: CPTII,S$GLB,, | Performed by: INTERNAL MEDICINE

## 2022-08-11 PROCEDURE — 99214 OFFICE O/P EST MOD 30 MIN: CPT | Mod: S$GLB,,, | Performed by: INTERNAL MEDICINE

## 2022-08-11 PROCEDURE — 99999 PR PBB SHADOW E&M-EST. PATIENT-LVL V: CPT | Mod: PBBFAC,,, | Performed by: INTERNAL MEDICINE

## 2022-08-11 PROCEDURE — 99214 PR OFFICE/OUTPT VISIT, EST, LEVL IV, 30-39 MIN: ICD-10-PCS | Mod: S$GLB,,, | Performed by: INTERNAL MEDICINE

## 2022-08-11 PROCEDURE — 3072F LOW RISK FOR RETINOPATHY: CPT | Mod: CPTII,S$GLB,, | Performed by: INTERNAL MEDICINE

## 2022-08-11 PROCEDURE — 3075F SYST BP GE 130 - 139MM HG: CPT | Mod: CPTII,S$GLB,, | Performed by: INTERNAL MEDICINE

## 2022-08-11 PROCEDURE — 3288F FALL RISK ASSESSMENT DOCD: CPT | Mod: CPTII,S$GLB,, | Performed by: INTERNAL MEDICINE

## 2022-08-11 PROCEDURE — 3078F DIAST BP <80 MM HG: CPT | Mod: CPTII,S$GLB,, | Performed by: INTERNAL MEDICINE

## 2022-08-11 PROCEDURE — 3008F BODY MASS INDEX DOCD: CPT | Mod: CPTII,S$GLB,, | Performed by: INTERNAL MEDICINE

## 2022-08-11 PROCEDURE — 3044F PR MOST RECENT HEMOGLOBIN A1C LEVEL <7.0%: ICD-10-PCS | Mod: CPTII,S$GLB,, | Performed by: INTERNAL MEDICINE

## 2022-08-11 PROCEDURE — 1125F PR PAIN SEVERITY QUANTIFIED, PAIN PRESENT: ICD-10-PCS | Mod: CPTII,S$GLB,, | Performed by: INTERNAL MEDICINE

## 2022-08-11 PROCEDURE — 1101F PR PT FALLS ASSESS DOC 0-1 FALLS W/OUT INJ PAST YR: ICD-10-PCS | Mod: CPTII,S$GLB,, | Performed by: INTERNAL MEDICINE

## 2022-08-11 PROCEDURE — 3075F PR MOST RECENT SYSTOLIC BLOOD PRESS GE 130-139MM HG: ICD-10-PCS | Mod: CPTII,S$GLB,, | Performed by: INTERNAL MEDICINE

## 2022-08-11 PROCEDURE — 3008F PR BODY MASS INDEX (BMI) DOCUMENTED: ICD-10-PCS | Mod: CPTII,S$GLB,, | Performed by: INTERNAL MEDICINE

## 2022-08-11 PROCEDURE — 4010F PR ACE/ARB THEARPY RXD/TAKEN: ICD-10-PCS | Mod: CPTII,S$GLB,, | Performed by: INTERNAL MEDICINE

## 2022-08-11 PROCEDURE — 1159F PR MEDICATION LIST DOCUMENTED IN MEDICAL RECORD: ICD-10-PCS | Mod: CPTII,S$GLB,, | Performed by: INTERNAL MEDICINE

## 2022-08-11 PROCEDURE — 82043 UR ALBUMIN QUANTITATIVE: CPT | Performed by: INTERNAL MEDICINE

## 2022-08-11 PROCEDURE — 3288F PR FALLS RISK ASSESSMENT DOCUMENTED: ICD-10-PCS | Mod: CPTII,S$GLB,, | Performed by: INTERNAL MEDICINE

## 2022-08-11 PROCEDURE — 1159F MED LIST DOCD IN RCRD: CPT | Mod: CPTII,S$GLB,, | Performed by: INTERNAL MEDICINE

## 2022-08-11 PROCEDURE — 3044F HG A1C LEVEL LT 7.0%: CPT | Mod: CPTII,S$GLB,, | Performed by: INTERNAL MEDICINE

## 2022-08-11 PROCEDURE — 82570 ASSAY OF URINE CREATININE: CPT | Performed by: INTERNAL MEDICINE

## 2022-08-11 PROCEDURE — 3078F PR MOST RECENT DIASTOLIC BLOOD PRESSURE < 80 MM HG: ICD-10-PCS | Mod: CPTII,S$GLB,, | Performed by: INTERNAL MEDICINE

## 2022-08-11 PROCEDURE — 3072F PR LOW RISK FOR RETINOPATHY: ICD-10-PCS | Mod: CPTII,S$GLB,, | Performed by: INTERNAL MEDICINE

## 2022-08-11 RX ORDER — CHLORTHALIDONE 25 MG/1
25 TABLET ORAL DAILY
Qty: 90 TABLET | Refills: 1 | Status: SHIPPED | OUTPATIENT
Start: 2022-08-11 | End: 2022-08-19

## 2022-08-11 RX ORDER — CHLORTHALIDONE 25 MG/1
25 TABLET ORAL DAILY
COMMUNITY
End: 2022-08-11 | Stop reason: SDUPTHER

## 2022-08-11 NOTE — PROGRESS NOTES
Patient ID: Vivienne Jose is a 72 y.o. female.    Chief Complaint: Diabetes    HPI Vivienne is a 72 y.o. female with  liver cirrhosis, hypertension, type 2 diabetes, coronary artery disease, hypothyroidism, sleep apnea and recurrent urinary tract infection who presents for routine follow-up of medical conditions. She presents with her  today.     She complains today of pain.  Pain is located in the upper right thigh and right hip region.  No associated symptoms of radiation of pain, tingling, numbness.  No pain in the groin area.  Constant problem in duration.  Feels like a tightness or swelling in character. She has leg length discrepancy and has worn down her shoe insert.     Also complains of cramping in various locations, but specifically in the legs and toes at nighttime.  She was instructed to take 2 chlorthalidone (50 mg daily) for swelling and blood pressure.  She reduced the chlorthalidone on her own to 1 tablet (25 mg) daily and this did improve the issue of cramping but it still occurs occasionally.     Reviewed lab results from 8/4/22 with her today.     Review of Systems   Musculoskeletal:        See HPI   All other systems reviewed and are negative.     Objective:     Vitals:    08/11/22 1255   BP: 134/60   Pulse: 65        Physical Exam  Vitals reviewed.   Constitutional:       General: She is not in acute distress.     Appearance: Normal appearance. She is well-developed. She is not ill-appearing, toxic-appearing or diaphoretic.   HENT:      Head: Normocephalic and atraumatic.      Right Ear: External ear normal.      Left Ear: External ear normal.      Nose: Nose normal.   Eyes:      General: No scleral icterus.        Right eye: No discharge.         Left eye: No discharge.      Extraocular Movements: Extraocular movements intact.      Conjunctiva/sclera: Conjunctivae normal.   Cardiovascular:      Rate and Rhythm: Normal rate and regular rhythm.      Heart sounds: Normal heart sounds. No  murmur heard.    No friction rub. No gallop.   Pulmonary:      Effort: Pulmonary effort is normal. No respiratory distress.      Breath sounds: Normal breath sounds. No stridor. No wheezing, rhonchi or rales.   Musculoskeletal:        Legs:       Comments: Area of pain as highlighted in diagram. Worsening of pain with palpation of the site. Negative straight leg raise test on the right. Full ROM of right hip inactive passively without pain   Skin:     General: Skin is warm and dry.   Neurological:      General: No focal deficit present.      Mental Status: She is alert and oriented to person, place, and time. Mental status is at baseline.   Psychiatric:         Mood and Affect: Mood normal.         Behavior: Behavior normal.         Thought Content: Thought content normal.         Judgment: Judgment normal.         Assessment:       1. Type 2 diabetes mellitus with stage 3a chronic kidney disease, with long-term current use of insulin Well controlled   2. Hypertension associated with diabetes Well controlled   3. Muscle cramp Chronic   4. Hypothyroidism, unspecified type Well controlled   5. Hepatic cirrhosis, unspecified hepatic cirrhosis type, unspecified whether ascites present Chronic   6. Hyperlipidemia associated with type 2 diabetes mellitus Well controlled   7. Muscle strain Active   8. Leg length discrepancy Chronic   9. Encounter for screening colonoscopy    10. Screening mammogram for breast cancer        Plan:         Type 2 diabetes mellitus with stage 3a chronic kidney disease, with long-term current use of insulin  Comments:  Continue current medication  Orders:  -     Comprehensive Metabolic Panel; Future; Expected date: 02/11/2023  -     Hemoglobin A1C; Future; Expected date: 02/11/2023  -     Lipid Panel; Future; Expected date: 02/11/2023  -     Microalbumin/Creatinine Ratio, Urine; Future; Expected date: 08/11/2022    Hypertension associated with diabetes  Comments:  continue current regimen    Orders:  -     chlorthalidone (HYGROTEN) 25 MG Tab; Take 1 tablet (25 mg total) by mouth once daily.  Dispense: 90 tablet; Refill: 1    Muscle cramp  Comments:  Stay well hydrated. Do gentle stretching. Will check mag level. She prefers to check Mag level with next lab draw  Orders:  -     MAGNESIUM; Future; Expected date: 08/11/2022    Hypothyroidism, unspecified type  Comments:  Continue current medication  Orders:  -     TSH; Future; Expected date: 02/11/2023    Hepatic cirrhosis, unspecified hepatic cirrhosis type, unspecified whether ascites present  Comments:  Being monitored by hepatology.    Hyperlipidemia associated with type 2 diabetes mellitus  Comments:  Continue current medication    Muscle strain  Comments:  Cause for pain in right upper leg. Stretch, use topical products such as icy hot PRN. Treat leg length discrepancy    Leg length discrepancy  Comments:  She will attempt to get inserts on her own. If unable, will contact me for referral to PT, Ortho or podiatry for help with this    Encounter for screening colonoscopy  -     Case Request Endoscopy: COLONOSCOPY    Screening mammogram for breast cancer  -     Mammo Digital Screening Bilat; Future; Expected date: 08/11/2022      Addendum: Patient also reported chronic sinus pressure and runny nose. She sees allergy and is taking OTC allergy pill, using nasal spray and getting allergy shots. Symptoms are somewhat improved at this time. I informed her that if symptoms worsen despite this regimen, she can contact me in the future for ENT referral to evaluate for chronic sinusitis. She understands and agrees.     RTC 6 months     Warning signs discussed, patient to call with any further issues or worsening of symptoms.       Parts of the above note were dictated using a voice dictation software. Please excuse any grammatical or typographical errors.

## 2022-08-12 ENCOUNTER — TELEPHONE (OUTPATIENT)
Dept: ENDOSCOPY | Facility: HOSPITAL | Age: 72
End: 2022-08-12
Payer: MEDICARE

## 2022-08-12 ENCOUNTER — CLINICAL SUPPORT (OUTPATIENT)
Dept: INTERNAL MEDICINE | Facility: CLINIC | Age: 72
End: 2022-08-12
Payer: MEDICARE

## 2022-08-12 DIAGNOSIS — J30.1 ALLERGIC RHINITIS DUE TO POLLEN, UNSPECIFIED SEASONALITY: Primary | ICD-10-CM

## 2022-08-12 DIAGNOSIS — J30.81 ALLERGY TO CATS: ICD-10-CM

## 2022-08-12 DIAGNOSIS — Z91.09 ALLERGY TO MITES: ICD-10-CM

## 2022-08-12 LAB
ALBUMIN/CREAT UR: NORMAL UG/MG (ref 0–30)
CREAT UR-MCNC: 29 MG/DL (ref 15–325)
MICROALBUMIN UR DL<=1MG/L-MCNC: <5 UG/ML

## 2022-08-12 PROCEDURE — 99499 UNLISTED E&M SERVICE: CPT | Mod: S$GLB,,, | Performed by: ALLERGY & IMMUNOLOGY

## 2022-08-12 PROCEDURE — 95115 IMMUNOTHERAPY ONE INJECTION: CPT | Mod: S$GLB,,, | Performed by: STUDENT IN AN ORGANIZED HEALTH CARE EDUCATION/TRAINING PROGRAM

## 2022-08-12 PROCEDURE — 95115 PR IMMUNOTHERAPY, ONE INJECTION: ICD-10-PCS | Mod: S$GLB,,, | Performed by: STUDENT IN AN ORGANIZED HEALTH CARE EDUCATION/TRAINING PROGRAM

## 2022-08-12 PROCEDURE — 99499 NO LOS: ICD-10-PCS | Mod: S$GLB,,, | Performed by: ALLERGY & IMMUNOLOGY

## 2022-08-12 RX ORDER — SODIUM, POTASSIUM,MAG SULFATES 17.5-3.13G
1 SOLUTION, RECONSTITUTED, ORAL ORAL DAILY
Qty: 1 KIT | Refills: 0 | Status: SHIPPED | OUTPATIENT
Start: 2022-08-12 | End: 2022-08-14

## 2022-08-12 NOTE — TELEPHONE ENCOUNTER
Endoscopy Scheduling Questionnaire:         1. Are you taking any blood thinners? No               If Yes  Have you been on them for longer than one year?     2. Have you been diagnosed with Diverticulitis in past three months?  No    3. Are you on dialysis or have Kidney Disease? No    4. Previous Colonoscopy?  Yes         If yes Do you have a history of colon polyps?  Yes      6. Are you a diabetic?  Yes    7. Do you have a history of constipation?  No      Procedure scheduled with Dr. Scott Jasso on  09/09/2022    The prep being used is Extended Suprep     The patient's prep instructions were sent by [employer]

## 2022-08-15 ENCOUNTER — IMMUNIZATION (OUTPATIENT)
Dept: PHARMACY | Facility: CLINIC | Age: 72
End: 2022-08-15
Payer: MEDICARE

## 2022-08-15 DIAGNOSIS — Z23 NEED FOR VACCINATION: Primary | ICD-10-CM

## 2022-08-19 ENCOUNTER — OFFICE VISIT (OUTPATIENT)
Dept: HEPATOLOGY | Facility: CLINIC | Age: 72
End: 2022-08-19
Payer: MEDICARE

## 2022-08-19 VITALS
SYSTOLIC BLOOD PRESSURE: 119 MMHG | BODY MASS INDEX: 44.57 KG/M2 | HEART RATE: 75 BPM | OXYGEN SATURATION: 95 % | DIASTOLIC BLOOD PRESSURE: 57 MMHG | RESPIRATION RATE: 18 BRPM | WEIGHT: 242.19 LBS | HEIGHT: 62 IN | TEMPERATURE: 99 F

## 2022-08-19 DIAGNOSIS — K74.69 OTHER CIRRHOSIS OF LIVER: Primary | ICD-10-CM

## 2022-08-19 DIAGNOSIS — R60.9 EDEMA, UNSPECIFIED TYPE: ICD-10-CM

## 2022-08-19 DIAGNOSIS — I85.10 SECONDARY ESOPHAGEAL VARICES WITHOUT BLEEDING: ICD-10-CM

## 2022-08-19 DIAGNOSIS — R60.0 EDEMA OF BOTH FEET: ICD-10-CM

## 2022-08-19 DIAGNOSIS — K75.81 NASH (NONALCOHOLIC STEATOHEPATITIS): ICD-10-CM

## 2022-08-19 PROCEDURE — 3078F PR MOST RECENT DIASTOLIC BLOOD PRESSURE < 80 MM HG: ICD-10-PCS | Mod: CPTII,S$GLB,, | Performed by: INTERNAL MEDICINE

## 2022-08-19 PROCEDURE — 4010F PR ACE/ARB THEARPY RXD/TAKEN: ICD-10-PCS | Mod: CPTII,S$GLB,, | Performed by: INTERNAL MEDICINE

## 2022-08-19 PROCEDURE — 99214 PR OFFICE/OUTPT VISIT, EST, LEVL IV, 30-39 MIN: ICD-10-PCS | Mod: S$GLB,,, | Performed by: INTERNAL MEDICINE

## 2022-08-19 PROCEDURE — 3072F LOW RISK FOR RETINOPATHY: CPT | Mod: CPTII,S$GLB,, | Performed by: INTERNAL MEDICINE

## 2022-08-19 PROCEDURE — 3072F PR LOW RISK FOR RETINOPATHY: ICD-10-PCS | Mod: CPTII,S$GLB,, | Performed by: INTERNAL MEDICINE

## 2022-08-19 PROCEDURE — 3044F HG A1C LEVEL LT 7.0%: CPT | Mod: CPTII,S$GLB,, | Performed by: INTERNAL MEDICINE

## 2022-08-19 PROCEDURE — 3008F BODY MASS INDEX DOCD: CPT | Mod: CPTII,S$GLB,, | Performed by: INTERNAL MEDICINE

## 2022-08-19 PROCEDURE — 3288F PR FALLS RISK ASSESSMENT DOCUMENTED: ICD-10-PCS | Mod: CPTII,S$GLB,, | Performed by: INTERNAL MEDICINE

## 2022-08-19 PROCEDURE — 3061F PR NEG MICROALBUMINURIA RESULT DOCUMENTED/REVIEW: ICD-10-PCS | Mod: CPTII,S$GLB,, | Performed by: INTERNAL MEDICINE

## 2022-08-19 PROCEDURE — 1101F PT FALLS ASSESS-DOCD LE1/YR: CPT | Mod: CPTII,S$GLB,, | Performed by: INTERNAL MEDICINE

## 2022-08-19 PROCEDURE — 3008F PR BODY MASS INDEX (BMI) DOCUMENTED: ICD-10-PCS | Mod: CPTII,S$GLB,, | Performed by: INTERNAL MEDICINE

## 2022-08-19 PROCEDURE — 3288F FALL RISK ASSESSMENT DOCD: CPT | Mod: CPTII,S$GLB,, | Performed by: INTERNAL MEDICINE

## 2022-08-19 PROCEDURE — 3061F NEG MICROALBUMINURIA REV: CPT | Mod: CPTII,S$GLB,, | Performed by: INTERNAL MEDICINE

## 2022-08-19 PROCEDURE — 3066F NEPHROPATHY DOC TX: CPT | Mod: CPTII,S$GLB,, | Performed by: INTERNAL MEDICINE

## 2022-08-19 PROCEDURE — 4010F ACE/ARB THERAPY RXD/TAKEN: CPT | Mod: CPTII,S$GLB,, | Performed by: INTERNAL MEDICINE

## 2022-08-19 PROCEDURE — 99999 PR PBB SHADOW E&M-EST. PATIENT-LVL V: CPT | Mod: PBBFAC,,, | Performed by: INTERNAL MEDICINE

## 2022-08-19 PROCEDURE — 1159F PR MEDICATION LIST DOCUMENTED IN MEDICAL RECORD: ICD-10-PCS | Mod: CPTII,S$GLB,, | Performed by: INTERNAL MEDICINE

## 2022-08-19 PROCEDURE — 3074F SYST BP LT 130 MM HG: CPT | Mod: CPTII,S$GLB,, | Performed by: INTERNAL MEDICINE

## 2022-08-19 PROCEDURE — 3044F PR MOST RECENT HEMOGLOBIN A1C LEVEL <7.0%: ICD-10-PCS | Mod: CPTII,S$GLB,, | Performed by: INTERNAL MEDICINE

## 2022-08-19 PROCEDURE — 1160F PR REVIEW ALL MEDS BY PRESCRIBER/CLIN PHARMACIST DOCUMENTED: ICD-10-PCS | Mod: CPTII,S$GLB,, | Performed by: INTERNAL MEDICINE

## 2022-08-19 PROCEDURE — 3078F DIAST BP <80 MM HG: CPT | Mod: CPTII,S$GLB,, | Performed by: INTERNAL MEDICINE

## 2022-08-19 PROCEDURE — 3074F PR MOST RECENT SYSTOLIC BLOOD PRESSURE < 130 MM HG: ICD-10-PCS | Mod: CPTII,S$GLB,, | Performed by: INTERNAL MEDICINE

## 2022-08-19 PROCEDURE — 1157F PR ADVANCE CARE PLAN OR EQUIV PRESENT IN MEDICAL RECORD: ICD-10-PCS | Mod: CPTII,S$GLB,, | Performed by: INTERNAL MEDICINE

## 2022-08-19 PROCEDURE — 1125F PR PAIN SEVERITY QUANTIFIED, PAIN PRESENT: ICD-10-PCS | Mod: CPTII,S$GLB,, | Performed by: INTERNAL MEDICINE

## 2022-08-19 PROCEDURE — 1125F AMNT PAIN NOTED PAIN PRSNT: CPT | Mod: CPTII,S$GLB,, | Performed by: INTERNAL MEDICINE

## 2022-08-19 PROCEDURE — 99214 OFFICE O/P EST MOD 30 MIN: CPT | Mod: S$GLB,,, | Performed by: INTERNAL MEDICINE

## 2022-08-19 PROCEDURE — 99999 PR PBB SHADOW E&M-EST. PATIENT-LVL V: ICD-10-PCS | Mod: PBBFAC,,, | Performed by: INTERNAL MEDICINE

## 2022-08-19 PROCEDURE — 1157F ADVNC CARE PLAN IN RCRD: CPT | Mod: CPTII,S$GLB,, | Performed by: INTERNAL MEDICINE

## 2022-08-19 PROCEDURE — 1160F RVW MEDS BY RX/DR IN RCRD: CPT | Mod: CPTII,S$GLB,, | Performed by: INTERNAL MEDICINE

## 2022-08-19 PROCEDURE — 1101F PR PT FALLS ASSESS DOC 0-1 FALLS W/OUT INJ PAST YR: ICD-10-PCS | Mod: CPTII,S$GLB,, | Performed by: INTERNAL MEDICINE

## 2022-08-19 PROCEDURE — 1159F MED LIST DOCD IN RCRD: CPT | Mod: CPTII,S$GLB,, | Performed by: INTERNAL MEDICINE

## 2022-08-19 PROCEDURE — 3066F PR DOCUMENTATION OF TREATMENT FOR NEPHROPATHY: ICD-10-PCS | Mod: CPTII,S$GLB,, | Performed by: INTERNAL MEDICINE

## 2022-08-19 RX ORDER — SPIRONOLACTONE 50 MG/1
50 TABLET, FILM COATED ORAL DAILY
Qty: 30 TABLET | Refills: 11 | Status: SHIPPED | OUTPATIENT
Start: 2022-08-19 | End: 2022-08-26 | Stop reason: SDUPTHER

## 2022-08-19 RX ORDER — FUROSEMIDE 20 MG/1
20 TABLET ORAL DAILY
Qty: 30 TABLET | Refills: 11 | Status: SHIPPED | OUTPATIENT
Start: 2022-08-19 | End: 2022-08-26 | Stop reason: SDUPTHER

## 2022-08-19 NOTE — PATIENT INSTRUCTIONS
Labs in 2 weeks and then in one month and then every 3 months  Nabila DELAROSA ini 12/22  EGD 2024  Return 3 months  Start lasix 20 mg and spironolactone 50 mg daily; hold chlorthalidone and potassium

## 2022-08-19 NOTE — PROGRESS NOTES
HEPATOLOGY FOLLOW UP    Referring Physician: Aislinn Magallon MD  Current Corresponding Physician: Aislinn Magallon MD    Vivienne Jose is here for follow up of compensated cirrhosis.    HPI  She is a 72 y.o. female with a PMHx of DM, HTN, CAD, hyperlipidemia, recurrent UTIs and obesity who had a renal stone CT and abdominal US (both 11/20) and was found to have a nodular liver c/w cirrhosis but no HCC. The patient does not drink alcohol heavily; there is no family history of chronic liver disease. HCV AB was negative in 2017. I saw her in consultation 11/25/20.    Interval HIstory  Since Vivienne Jose's last few visits:    C/o edema, worse at the end of the day. No worsening mental status.    Serologic w/u for CLD:  HCV Ab-, HBsAg-, HBcAb-, HBsAb-, HAV IgG+  CHRISTINA+ (1:1280), ASMA-,   Ferritin 38, iron sat 15%  Peth negative  Alpha 1 antitrypsin level 123    Labs 8/4/22: ALT 26, AST 19, ALKP 75, Tbil 0.5, albumin 3.3    Abdomen US 6/17/22: fatty liver and no cancer    EGD: 10/21: no EV; repeat in 2024    MELD-Na score: 6 at 8/4/2022  7:40 AM  MELD score: 6 at 8/4/2022  7:40 AM  Calculated from:  Serum Creatinine: 0.8 mg/dL (Using min of 1 mg/dL) at 8/4/2022  7:40 AM  Serum Sodium: 144 mmol/L (Using max of 137 mmol/L) at 8/4/2022  7:40 AM  Total Bilirubin: 0.5 mg/dL (Using min of 1 mg/dL) at 8/4/2022  7:40 AM  INR(ratio): 1.0 at 8/4/2022  7:40 AM  Age: 72 years    Outpatient Encounter Medications as of 8/19/2022   Medication Sig Dispense Refill    amLODIPine (NORVASC) 5 MG tablet Take 1 tablet (5 mg total) by mouth every evening. 90 tablet 1    amoxicillin-clavulanate 875-125mg (AUGMENTIN) 875-125 mg per tablet Take 1 tablet by mouth 2 (two) times daily. 20 tablet 0    ascorbic acid, vitamin C, (VITAMIN C) 100 MG tablet Take 500 mg by mouth 2 (two) times daily.       aspirin (ECOTRIN) 81 MG EC tablet Take 81 mg by mouth once daily.      blood sugar diagnostic (TRUE METRIX GLUCOSE TEST STRIP) Strp TEST TWO  TIMES DAILY 200 strip 6    blood-glucose meter Misc [employer] True Metrix Air meter 1 each 0    calcium carbonate-vitamin D3 600 mg(1,500mg) -100 unit Cap Take 1 tablet by mouth once daily.      chlorthalidone (HYGROTEN) 25 MG Tab Take 1 tablet (25 mg total) by mouth once daily. 90 tablet 1    ESTRING 2 mg (7.5 mcg /24 hour) vaginal ring INSERT 1 RING VAGINALLY AS DIRECTED, CHANGE EVERY 90 DAYS 1 each 1    fluticasone propionate (FLONASE) 50 mcg/actuation nasal spray 2 sprays (100 mcg total) by Each Nostril route once daily. 48 g 4    gabapentin (NEURONTIN) 600 MG tablet Take 1 tablet (600 mg total) by mouth 2 (two) times daily. 180 tablet 1    glipiZIDE (GLUCOTROL) 5 MG tablet TAKE 1 TABLET TWICE DAILY BEFORE A MEAL 180 tablet 3    insulin degludec (TRESIBA FLEXTOUCH U-100) 100 unit/mL (3 mL) insulin pen INJECT 38 UNITS INTO THE SKIN EVERY EVENING. 10 pen 2    ketotifen (ZADITOR) 0.025 % (0.035 %) ophthalmic solution Place 1-2 drops into both eyes once daily.      L.acid-B.bifidum-B.animal-FOS 25 billion cell -100 mg Cap Take 1 capsule by mouth once daily.      lancets (TRUEPLUS LANCETS) 28 gauge [facility] [place]ject 1 lancet into the skin 2 (two) times daily before meals. 200 each 6    levothyroxine (SYNTHROID) 75 MCG tablet Take 1 tablet (75 mcg total) by mouth once daily. 90 tablet 1    loratadine (CLARITIN) 10 mg tablet Take 10 mg by mouth once daily.      lovastatin (MEVACOR) 40 MG tablet TAKE 1 TABLET NIGHTLY (SUBSTITUTED FOR MEVACOR) 90 tablet 2    magnesium oxide-Mg AA chelate (MG-PLUS-PROTEIN) 133 mg Tab Take by mouth as directed.      metFORMIN (GLUCOPHAGE) 1000 MG tablet Take 1 tablet (1,000 mg total) by mouth 2 (two) times daily with meals. 180 tablet 1    mv-mn/folic acid/vit [id number] (ALIVE ONCE DAILY WOMEN 50 PLUS ORAL) Take 1 tablet by mouth once daily.      omeprazole (PRILOSEC) 20 MG capsule TAKE 1 CAPSULE EVERY DAY AS NEEDED 90 capsule 0    pen needle, diabetic (BD ULTRA-FINE EDUARDO PEN  "NEEDLE) 32 gauge x 5/32" Ndle USE AS DIRECTED 200 each 6    potassium chloride SA (K-DUR,KLOR-CON) 10 MEQ tablet Take 3 tablets (30 meq) PO daily 270 tablet 3    semaglutide (OZEMPIC) 0.25 mg or 0.5 mg(2 mg/1.5 mL) pen injector Inject 0.5 mg into the skin every 7 days. 3 pen 4    valACYclovir (VALTREX) 500 MG tablet Take 1 tablet (500 mg total) by mouth once daily. 90 tablet 1    valsartan (DIOVAN) 160 MG tablet Take 1 tablet (160 mg total) by mouth 2 (two) times daily. 180 tablet 1    azelastine (ASTELIN) 137 mcg (0.1 %) nasal spray 2 sprays (274 mcg total) by Nasal route 2 (two) times daily. 120 mL 4     No facility-administered encounter medications on file as of 8/19/2022.     Review of patient's allergies indicates:   Allergen Reactions    Sulfa (sulfonamide antibiotics) Nausea Only    Ciprofloxacin     Januvia [sitagliptin]      abd pain    Lisinopril     Lotensin [benazepril]     Nexium [esomeprazole magnesium] Other (See Comments)     Gas     Past Medical History:   Diagnosis Date    Allergy     Angio-edema     Arthritis     Cataract     bilateral - not removed    Cerebrovascular malformation     Cirrhosis 11/24/2020    Colon polyps     Coronary artery disease     Diabetes mellitus, type 2     Diabetic peripheral neuropathy     GERD (gastroesophageal reflux disease)     Herpes infection     Hyperlipidemia     Hypertension     Hypothyroidism     Kidney stones     Mild nonproliferative diabetic retinopathy of 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 edema associated with type 2 diabetes mellitus 4/18/2019    DEL RIO (nonalcoholic steatohepatitis) 8/19/2022    Nausea & vomiting 11/23/2015    Obesity, morbid     Ovarian cyst     Seizure disorder, focal motor     Sleep apnea     Type II or unspecified type diabetes mellitus with neurological manifestations, uncontrolled(250.62)     Urticaria        Review of Systems   Constitutional: Negative.    HENT: Negative.    Eyes: Negative.  "   Respiratory: Negative.    Cardiovascular: Negative.    Gastrointestinal: Negative.    Genitourinary: Negative.    Musculoskeletal: Negative.    Skin: Negative.    Neurological: Negative.    Psychiatric/Behavioral: Negative.      Vitals:    08/19/22 1405   BP: (!) 119/57   Pulse: 75   Resp: 18   Temp: 98.8 °F (37.1 °C)       Physical Exam  Vitals reviewed.   Constitutional:       Appearance: She is well-developed.   HENT:      Head: Normocephalic and atraumatic.   Eyes:      General: No scleral icterus.     Conjunctiva/sclera: Conjunctivae normal.      Pupils: Pupils are equal, round, and reactive to light.   Neck:      Thyroid: No thyromegaly.   Cardiovascular:      Rate and Rhythm: Normal rate and regular rhythm.      Heart sounds: Normal heart sounds.   Pulmonary:      Effort: Pulmonary effort is normal.      Breath sounds: Normal breath sounds. No rales.   Abdominal:      General: Bowel sounds are normal. There is no distension.      Palpations: Abdomen is soft. There is no mass.      Tenderness: There is no abdominal tenderness.   Musculoskeletal:         General: Swelling present. Normal range of motion.      Cervical back: Normal range of motion and neck supple.   Skin:     General: Skin is warm and dry.      Findings: No rash.   Neurological:      Mental Status: She is alert and oriented to person, place, and time.         MELD-Na score: 6 at 8/4/2022  7:40 AM  MELD score: 6 at 8/4/2022  7:40 AM  Calculated from:  Serum Creatinine: 0.8 mg/dL (Using min of 1 mg/dL) at 8/4/2022  7:40 AM  Serum Sodium: 144 mmol/L (Using max of 137 mmol/L) at 8/4/2022  7:40 AM  Total Bilirubin: 0.5 mg/dL (Using min of 1 mg/dL) at 8/4/2022  7:40 AM  INR(ratio): 1.0 at 8/4/2022  7:40 AM  Age: 72 years    Lab Results   Component Value Date     (H) 08/04/2022     (H) 08/04/2022    BUN 14 08/04/2022    BUN 14 08/04/2022    CREATININE 0.8 08/04/2022    CREATININE 0.8 08/04/2022    CALCIUM 10.1 08/04/2022    CALCIUM 10.1  08/04/2022     08/04/2022     08/04/2022    K 3.8 08/04/2022    K 3.8 08/04/2022     08/04/2022     08/04/2022    PROT 7.3 08/04/2022    PROT 7.3 08/04/2022    CO2 29 08/04/2022    CO2 29 08/04/2022    ANIONGAP 11 08/04/2022    ANIONGAP 11 08/04/2022    WBC 9.06 08/04/2022    WBC 9.06 08/04/2022    RBC 4.27 08/04/2022    RBC 4.27 08/04/2022    HGB 13.9 08/04/2022    HGB 13.9 08/04/2022    HCT 41.1 08/04/2022    HCT 41.1 08/04/2022    MCV 96 08/04/2022    MCV 96 08/04/2022    MCH 32.6 (H) 08/04/2022    MCH 32.6 (H) 08/04/2022    MCHC 33.8 08/04/2022    MCHC 33.8 08/04/2022     Lab Results   Component Value Date    RDW 14.5 08/04/2022    RDW 14.5 08/04/2022     08/04/2022     08/04/2022    MPV 10.0 08/04/2022    MPV 10.0 08/04/2022    GRAN 5.5 08/04/2022    GRAN 60.8 08/04/2022    GRAN 5.5 08/04/2022    GRAN 60.8 08/04/2022    LYMPH 2.4 08/04/2022    LYMPH 26.3 08/04/2022    LYMPH 2.4 08/04/2022    LYMPH 26.3 08/04/2022    MONO 0.8 08/04/2022    MONO 8.8 08/04/2022    MONO 0.8 08/04/2022    MONO 8.8 08/04/2022    EOSINOPHIL 3.4 08/04/2022    EOSINOPHIL 3.4 08/04/2022    BASOPHIL 0.4 08/04/2022    BASOPHIL 0.4 08/04/2022    EOS 0.3 08/04/2022    EOS 0.3 08/04/2022    BASO 0.04 08/04/2022    BASO 0.04 08/04/2022    APTT 25.6 07/27/2011    GROUPTRH O POS 11/29/2007    CHOL 143 02/07/2022    TRIG 119 02/07/2022    HDL 52 02/07/2022    CHOLHDL 36.4 02/07/2022    TOTALCHOLEST 2.8 02/07/2022    ALBUMIN 3.3 (L) 08/04/2022    ALBUMIN 3.3 (L) 08/04/2022    BILIDIR 0.3 06/15/2013    AST 19 08/04/2022    AST 19 08/04/2022    ALT 26 08/04/2022    ALT 26 08/04/2022    ALKPHOS 75 08/04/2022    ALKPHOS 75 08/04/2022    MG 2.0 07/27/2011    LABPROT 10.6 08/04/2022    INR 1.0 08/04/2022       Assessment and Plan:  Vivienne Jose is a 72 y.o. female with compensated cirrhosis. Current recommendations:  1. Cirrhosis: now with a decline in albumin and persistent edema: recommend d/c chlorthalidone and  Kdur; start lasix 20 mg and spironolactone 50 mg daily; : labs every 2 weeks x 2 then in 3 months: HCC screening in 6 months (12/22); EGD to screen for varices-repeat in 2024;  2. Recurrent UTIs: monitor  3. Edema: diuretics as above    Return 3 months

## 2022-08-26 ENCOUNTER — PATIENT MESSAGE (OUTPATIENT)
Dept: HEPATOLOGY | Facility: CLINIC | Age: 72
End: 2022-08-26
Payer: MEDICARE

## 2022-08-26 DIAGNOSIS — R60.9 EDEMA, UNSPECIFIED TYPE: ICD-10-CM

## 2022-08-26 RX ORDER — FUROSEMIDE 20 MG/1
20 TABLET ORAL DAILY
Qty: 30 TABLET | Refills: 11 | Status: SHIPPED | OUTPATIENT
Start: 2022-08-26 | End: 2023-01-09 | Stop reason: SDUPTHER

## 2022-08-26 RX ORDER — SPIRONOLACTONE 50 MG/1
50 TABLET, FILM COATED ORAL DAILY
Qty: 30 TABLET | Refills: 11 | Status: SHIPPED | OUTPATIENT
Start: 2022-08-26 | End: 2023-01-09 | Stop reason: SDUPTHER

## 2022-08-29 ENCOUNTER — OFFICE VISIT (OUTPATIENT)
Dept: SLEEP MEDICINE | Facility: CLINIC | Age: 72
End: 2022-08-29
Payer: MEDICARE

## 2022-08-29 VITALS
HEART RATE: 63 BPM | BODY MASS INDEX: 44.21 KG/M2 | DIASTOLIC BLOOD PRESSURE: 65 MMHG | WEIGHT: 241.69 LBS | SYSTOLIC BLOOD PRESSURE: 114 MMHG

## 2022-08-29 DIAGNOSIS — G47.33 OSA (OBSTRUCTIVE SLEEP APNEA): Primary | ICD-10-CM

## 2022-08-29 PROCEDURE — 1159F MED LIST DOCD IN RCRD: CPT | Mod: CPTII,S$GLB,, | Performed by: PSYCHIATRY & NEUROLOGY

## 2022-08-29 PROCEDURE — 3061F NEG MICROALBUMINURIA REV: CPT | Mod: CPTII,S$GLB,, | Performed by: PSYCHIATRY & NEUROLOGY

## 2022-08-29 PROCEDURE — 4010F PR ACE/ARB THEARPY RXD/TAKEN: ICD-10-PCS | Mod: CPTII,S$GLB,, | Performed by: PSYCHIATRY & NEUROLOGY

## 2022-08-29 PROCEDURE — 1101F PR PT FALLS ASSESS DOC 0-1 FALLS W/OUT INJ PAST YR: ICD-10-PCS | Mod: CPTII,S$GLB,, | Performed by: PSYCHIATRY & NEUROLOGY

## 2022-08-29 PROCEDURE — 1159F PR MEDICATION LIST DOCUMENTED IN MEDICAL RECORD: ICD-10-PCS | Mod: CPTII,S$GLB,, | Performed by: PSYCHIATRY & NEUROLOGY

## 2022-08-29 PROCEDURE — 3044F HG A1C LEVEL LT 7.0%: CPT | Mod: CPTII,S$GLB,, | Performed by: PSYCHIATRY & NEUROLOGY

## 2022-08-29 PROCEDURE — 99999 PR PBB SHADOW E&M-EST. PATIENT-LVL III: CPT | Mod: PBBFAC,,, | Performed by: PSYCHIATRY & NEUROLOGY

## 2022-08-29 PROCEDURE — 3288F PR FALLS RISK ASSESSMENT DOCUMENTED: ICD-10-PCS | Mod: CPTII,S$GLB,, | Performed by: PSYCHIATRY & NEUROLOGY

## 2022-08-29 PROCEDURE — 3061F PR NEG MICROALBUMINURIA RESULT DOCUMENTED/REVIEW: ICD-10-PCS | Mod: CPTII,S$GLB,, | Performed by: PSYCHIATRY & NEUROLOGY

## 2022-08-29 PROCEDURE — 99214 PR OFFICE/OUTPT VISIT, EST, LEVL IV, 30-39 MIN: ICD-10-PCS | Mod: S$GLB,,, | Performed by: PSYCHIATRY & NEUROLOGY

## 2022-08-29 PROCEDURE — 3078F PR MOST RECENT DIASTOLIC BLOOD PRESSURE < 80 MM HG: ICD-10-PCS | Mod: CPTII,S$GLB,, | Performed by: PSYCHIATRY & NEUROLOGY

## 2022-08-29 PROCEDURE — 3078F DIAST BP <80 MM HG: CPT | Mod: CPTII,S$GLB,, | Performed by: PSYCHIATRY & NEUROLOGY

## 2022-08-29 PROCEDURE — 4010F ACE/ARB THERAPY RXD/TAKEN: CPT | Mod: CPTII,S$GLB,, | Performed by: PSYCHIATRY & NEUROLOGY

## 2022-08-29 PROCEDURE — 1125F PR PAIN SEVERITY QUANTIFIED, PAIN PRESENT: ICD-10-PCS | Mod: CPTII,S$GLB,, | Performed by: PSYCHIATRY & NEUROLOGY

## 2022-08-29 PROCEDURE — 3008F PR BODY MASS INDEX (BMI) DOCUMENTED: ICD-10-PCS | Mod: CPTII,S$GLB,, | Performed by: PSYCHIATRY & NEUROLOGY

## 2022-08-29 PROCEDURE — 3074F SYST BP LT 130 MM HG: CPT | Mod: CPTII,S$GLB,, | Performed by: PSYCHIATRY & NEUROLOGY

## 2022-08-29 PROCEDURE — 99999 PR PBB SHADOW E&M-EST. PATIENT-LVL III: ICD-10-PCS | Mod: PBBFAC,,, | Performed by: PSYCHIATRY & NEUROLOGY

## 2022-08-29 PROCEDURE — 1101F PT FALLS ASSESS-DOCD LE1/YR: CPT | Mod: CPTII,S$GLB,, | Performed by: PSYCHIATRY & NEUROLOGY

## 2022-08-29 PROCEDURE — 99214 OFFICE O/P EST MOD 30 MIN: CPT | Mod: S$GLB,,, | Performed by: PSYCHIATRY & NEUROLOGY

## 2022-08-29 PROCEDURE — 3072F PR LOW RISK FOR RETINOPATHY: ICD-10-PCS | Mod: CPTII,S$GLB,, | Performed by: PSYCHIATRY & NEUROLOGY

## 2022-08-29 PROCEDURE — 3072F LOW RISK FOR RETINOPATHY: CPT | Mod: CPTII,S$GLB,, | Performed by: PSYCHIATRY & NEUROLOGY

## 2022-08-29 PROCEDURE — 1157F PR ADVANCE CARE PLAN OR EQUIV PRESENT IN MEDICAL RECORD: ICD-10-PCS | Mod: CPTII,S$GLB,, | Performed by: PSYCHIATRY & NEUROLOGY

## 2022-08-29 PROCEDURE — 3288F FALL RISK ASSESSMENT DOCD: CPT | Mod: CPTII,S$GLB,, | Performed by: PSYCHIATRY & NEUROLOGY

## 2022-08-29 PROCEDURE — 3066F NEPHROPATHY DOC TX: CPT | Mod: CPTII,S$GLB,, | Performed by: PSYCHIATRY & NEUROLOGY

## 2022-08-29 PROCEDURE — 1125F AMNT PAIN NOTED PAIN PRSNT: CPT | Mod: CPTII,S$GLB,, | Performed by: PSYCHIATRY & NEUROLOGY

## 2022-08-29 PROCEDURE — 3066F PR DOCUMENTATION OF TREATMENT FOR NEPHROPATHY: ICD-10-PCS | Mod: CPTII,S$GLB,, | Performed by: PSYCHIATRY & NEUROLOGY

## 2022-08-29 PROCEDURE — 3044F PR MOST RECENT HEMOGLOBIN A1C LEVEL <7.0%: ICD-10-PCS | Mod: CPTII,S$GLB,, | Performed by: PSYCHIATRY & NEUROLOGY

## 2022-08-29 PROCEDURE — 3074F PR MOST RECENT SYSTOLIC BLOOD PRESSURE < 130 MM HG: ICD-10-PCS | Mod: CPTII,S$GLB,, | Performed by: PSYCHIATRY & NEUROLOGY

## 2022-08-29 PROCEDURE — 3008F BODY MASS INDEX DOCD: CPT | Mod: CPTII,S$GLB,, | Performed by: PSYCHIATRY & NEUROLOGY

## 2022-08-29 PROCEDURE — 1157F ADVNC CARE PLAN IN RCRD: CPT | Mod: CPTII,S$GLB,, | Performed by: PSYCHIATRY & NEUROLOGY

## 2022-08-29 NOTE — PATIENT INSTRUCTIONS
Taking Gabapentin and K+ for neuropathy and nocturnal muscle cramps. Taking Lasix and a potassium sparing diuretic.

## 2022-08-29 NOTE — PROGRESS NOTES
The patient location is: home  The chief complaint leading to consultation is: sleep disorder  Visit type: audiovisual  Total time spent with patient: 30 min  Each patient to whom he or she provides medical services by telemedicine is:  (1) informed of the relationship between the physician and patient and the respective role of any other health care provider with respect to management of the patient; and (2) notified that he or she may decline to receive medical services by telemedicine and may withdraw from such care at any time.        EPWORTH SLEEPINESS SCALE TOTAL SCORE  8/23/2022 7/6/2021 7/27/2020 12/10/2019 9/22/2019 2/24/2019 12/8/2018   Total score 11 16 13 16 11 11 14       Vivienne Jose is a 72 y.o. female seen today for CPAP follow up. Last seen on 7/7/2021  Got her recall replacement - Dreamstation 2 in 2021    The patient reports improved sleep continuity and daytime sleepiness on PAP. ESS today is 11/24.  Denies break through snoring.  No dry mouth.   Ocasional   air hunger. NO significant mask leaks and discomfort.  Using with [place] for her Pillows.    APAP 4 to 20; 90% was 11.3 cm  She is not quite comfortable  -  starting pressure feels low (her recalled machine was set at 11-20      Taking Gabapentin and K+ for neuropathy and nocturnal muscle cramps. Taking Lasix and a potassium sparing diuretic.     Bedtime: 9  Sleep latency: fast  Nocturnal awakenings:1-2 Returns to sleep in right away  Wake up time: 6:30-7    Medications pertinent to sleep disorders: no  Previously tried medications:no    DME: [facility]  SLEEP STUDIES:    PREVIOUS SLEEP STUDIES:     PSG/ SPLIT night study  In 2004 showed significant BAM with the AHI of 33.1/hour and SaO2 minimum of 88 %. Effective control of respiratory events was achieved at   10 cm H2O. Sleep efficiency was 79 %.  CPAP titration on 4/23/14: Adequate control of respiratory events was achieved at 12-13 cm of H2O. Weight 238 lbs. Frequent arousals were noted even  when her respiratory events have been controlled.  CPAP titration on 9/19/18: Effective control of respiratory events was achieved at 12 cm of H2O, however SaO2 was high 80s-low 90s. Weight 241 lbs.        DME: [facility] - got DS recall replacement from [facility] in mid 2021      PAST MEDICAL HISTORY:    Active Ambulatory Problems     Diagnosis Date Noted    Trigger middle finger of right hand 07/30/2012    Kidney stones 08/24/2012    Essential hypertension 08/24/2012    CAD (coronary artery disease) 11/23/2012    Chronic allergic rhinitis 11/30/2012    Nuclear sclerosis - Both Eyes 04/02/2013    Carpal tunnel syndrome of right wrist 07/08/2014    Proteinuria 02/03/2015    BAM (obstructive sleep apnea) 04/28/2015    Sacroiliitis 06/09/2015    Facet arthritis of lumbar region 06/09/2015    Physical deconditioning 06/09/2015    Osteopenia 09/22/2015    Vitamin D deficiency 09/22/2015    Hypocalcemia 02/29/2016    Morbid obesity with body mass index (BMI) of 40.0 to 44.9 in adult 02/29/2016    Recurrent HSV (herpes simplex virus) 03/21/2016    Chronic pain of right knee 05/02/2016    Gastroesophageal reflux disease 07/22/2016    Hyperlipidemia 07/22/2016    Nephrolithiasis 12/14/2016    Hypokalemia 04/24/2017    Diabetic polyneuropathy associated with type 2 diabetes mellitus 04/24/2017    Diverticulosis of intestine without bleeding 08/02/2017    Skin nodule 08/02/2017    Facet arthritis of cervical region 07/25/2011    Bilateral carotid artery disease 07/26/2011    Type 2 diabetes mellitus with stage 3a chronic kidney disease, with long-term current use of insulin 02/23/2018    Interstitial cystitis 09/21/2018    S/P total knee arthroplasty, right 03/07/2019    Decreased range of motion of right knee 04/05/2019    Muscle weakness of lower extremity 04/05/2019    Mild nonproliferative diabetic retinopathy of 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 edema associated with type 2 diabetes mellitus 04/18/2019    Hypothyroidism  08/05/2019    Thyroid nodule 11/19/2020    Cirrhosis 11/24/2020    Varices, esophageal 10/15/2021    History of recurrent UTI (urinary tract infection) 04/27/2022    De Quervain's tenosynovitis, left 07/07/2022    DEL RIO (nonalcoholic steatohepatitis) 08/19/2022    Edema of both feet 08/19/2022     Resolved Ambulatory Problems     Diagnosis Date Noted    Allergic conjunctivitis 11/23/2012    Post-surgical hypothyroidism 11/23/2012    Pain in limb 07/24/2014    Stiffness of joint 07/24/2014    Muscle weakness 07/24/2014    Screen for colon cancer 08/29/2014    UTI symptoms 11/05/2014    URI (upper respiratory infection) 01/07/2015    Dyslipidemia associated with type 2 diabetes mellitus 04/28/2015    LBP radiating to left leg 06/12/2015    Preventative health care 09/22/2015    Allergic rhinitis 09/22/2015    Type 2 diabetes mellitus, uncontrolled 09/22/2015    Hypertension associated with diabetes 09/22/2015    Sleep apnea 09/22/2015    Body mass index 45.0-49.9, adult 09/22/2015    Nausea & vomiting 11/23/2015    Dysuria 02/29/2016    Recurrent UTI 03/02/2017    Soft tissue mass 08/04/2017    Chronic kidney disease, stage III (moderate) 02/23/2018    Low vitamin D level 03/14/2018    Fatigue 06/21/2018    Gait instability 07/16/2018    Chronic left-sided low back pain with left-sided sciatica 10/22/2018    Hematuria 11/16/2018    Primary osteoarthritis of right knee 03/06/2019    Knee pain, right 04/05/2019    Gait abnormality 04/05/2019    Impaired flexibility of lower extremity 06/18/2021     Past Medical History:   Diagnosis Date    Allergy     Angio-edema     Arthritis     Cataract     Cerebrovascular malformation     Colon polyps     Coronary artery disease     Diabetes mellitus, type 2     Diabetic peripheral neuropathy     GERD (gastroesophageal reflux disease)     Herpes infection     Hypertension     Obesity, morbid     Ovarian cyst     Seizure disorder, focal motor     Type II or unspecified type diabetes  "mellitus with neurological manifestations, uncontrolled(250.62)     Urticaria          REVIEW OF SYSTEMS:   Sleep related symptoms as per HPI    reports weight gain - 50 lbs since last titration  Denies dyspnea  Denies palpitations  Denies acid reflux   Reports occasional polyuria  Reports  mood diturbance  Denies  anemia  Reports  muscle pain - joint stiffness due to arthritis    Otherwise, a balance of 10 systems reviewed is negative.    PHYSICAL EXAM:  /65 (BP Location: Left arm, Patient Position: Sitting, BP Method: Large (Automatic))   Pulse 63   Wt 109.6 kg (241 lb 11.2 oz)   LMP  (LMP Unknown)   BMI 44.21 kg/m²   GENERAL: Overweight body habitus, well groomed.      ASSESSMENT:    1.Previously diagnosed severe  BAM (obstructive sleep apnea). The patient symptomatically has  excessive daytime sleepiness, snoring,  witnessed breathing pauses, excessive daytime fatigue, gasping for air in sleep and interrupted sleep  with exam findings of "a crowded oral airway and elevated body mass index. The patient has medical co-morbidities of CAD, diabetes, heart disease and hypertension,  which can be worsened by BAM. Benefiting from CPAP use in terms of sleep continuity and daytime sleepiness. Compliant.    3. Muscle cramps at night - may be secondary to neuropathy and diuretics use. Moving the toes constantly.       PLAN:    Continue CA+  and Magnesium ; K+  Continue Gabapentin 600 mg BID  Discussed trial of Mirapex or requip possibility was discussed.  Lyrica trial was discussed.  She  can also try tonic water.     Will do increase APAP from 8-20 cm H2O and reorder supplies      More than 25 minutes of this 45 minutes visit was spent in counseling: during our discussion today, we talked about the etiology of BAM as well as the potential ramifications of untreated sleep apnea, which could include daytime sleepiness, hypertension, heart disease and/or stroke.  We discussed potential treatment options, which " could include weight loss, body positioning, continuous positive airway pressure (CPAP), or referral for surgical consideration. Meanwhile, she  is urged to avoid supine sleep, weight gain and alcoholic beverages since all of these can worsen BAM.     Precautions: The patient was advised to abstain from driving should he feel sleepy or drowsy.    Follow up: 12 months

## 2022-08-31 ENCOUNTER — CLINICAL SUPPORT (OUTPATIENT)
Dept: INTERNAL MEDICINE | Facility: CLINIC | Age: 72
End: 2022-08-31
Payer: MEDICARE

## 2022-08-31 DIAGNOSIS — J30.9 CHRONIC ALLERGIC RHINITIS: Primary | ICD-10-CM

## 2022-08-31 PROCEDURE — 95115 PR IMMUNOTHERAPY, ONE INJECTION: ICD-10-PCS | Mod: S$GLB,,, | Performed by: ALLERGY & IMMUNOLOGY

## 2022-08-31 PROCEDURE — 95115 IMMUNOTHERAPY ONE INJECTION: CPT | Mod: S$GLB,,, | Performed by: ALLERGY & IMMUNOLOGY

## 2022-08-31 PROCEDURE — 99499 UNLISTED E&M SERVICE: CPT | Mod: S$GLB,,, | Performed by: ALLERGY & IMMUNOLOGY

## 2022-08-31 PROCEDURE — 99499 NO LOS: ICD-10-PCS | Mod: S$GLB,,, | Performed by: ALLERGY & IMMUNOLOGY

## 2022-09-01 ENCOUNTER — TELEPHONE (OUTPATIENT)
Dept: ENDOSCOPY | Facility: HOSPITAL | Age: 72
End: 2022-09-01
Payer: MEDICARE

## 2022-09-01 NOTE — TELEPHONE ENCOUNTER
----- Message from Patric Cabezas sent at 9/1/2022 10:36 AM CDT -----  Contact: @[phone]  Pt is calling in stating that she received a call to confirm her appt and it stated that she was going to receive a endoscopy and she is getting a colonoscopy, pt hit 3 and wanted to make sure she did not cancel. Pt would like to know if she needs to have a covid test and if so do the doctor schedule it. Please call to discuss further.

## 2022-09-07 ENCOUNTER — TELEPHONE (OUTPATIENT)
Dept: ENDOSCOPY | Facility: HOSPITAL | Age: 72
End: 2022-09-07
Payer: MEDICARE

## 2022-09-09 ENCOUNTER — ANESTHESIA EVENT (OUTPATIENT)
Dept: ENDOSCOPY | Facility: HOSPITAL | Age: 72
End: 2022-09-09
Payer: MEDICARE

## 2022-09-09 ENCOUNTER — ANESTHESIA (OUTPATIENT)
Dept: ENDOSCOPY | Facility: HOSPITAL | Age: 72
End: 2022-09-09
Payer: MEDICARE

## 2022-09-09 ENCOUNTER — HOSPITAL ENCOUNTER (OUTPATIENT)
Facility: HOSPITAL | Age: 72
Discharge: HOME | End: 2022-09-09
Attending: INTERNAL MEDICINE | Admitting: INTERNAL MEDICINE
Payer: MEDICARE

## 2022-09-09 VITALS
HEART RATE: 70 BPM | TEMPERATURE: 98 F | OXYGEN SATURATION: 97 % | WEIGHT: 241 LBS | HEIGHT: 62 IN | RESPIRATION RATE: 16 BRPM | SYSTOLIC BLOOD PRESSURE: 101 MMHG | BODY MASS INDEX: 44.35 KG/M2 | DIASTOLIC BLOOD PRESSURE: 49 MMHG

## 2022-09-09 DIAGNOSIS — Z12.11 SCREENING FOR COLON CANCER: ICD-10-CM

## 2022-09-09 PROCEDURE — 25000003 PHARM REV CODE 250: Performed by: INTERNAL MEDICINE

## 2022-09-09 PROCEDURE — 37000009 HC ANESTHESIA EA ADD 15 MINS: Performed by: INTERNAL MEDICINE

## 2022-09-09 PROCEDURE — 63600175 PHARM REV CODE 636 W HCPCS: Performed by: NURSE ANESTHETIST, CERTIFIED REGISTERED

## 2022-09-09 PROCEDURE — 45385 PR COLONOSCOPY,REMV LESN,SNARE: ICD-10-PCS | Mod: PT,,, | Performed by: INTERNAL MEDICINE

## 2022-09-09 PROCEDURE — 27201089 HC SNARE, DISP (ANY): Performed by: INTERNAL MEDICINE

## 2022-09-09 PROCEDURE — 37000008 HC ANESTHESIA 1ST 15 MINUTES: Performed by: INTERNAL MEDICINE

## 2022-09-09 PROCEDURE — 88305 TISSUE EXAM BY PATHOLOGIST: CPT | Mod: 26,,, | Performed by: PATHOLOGY

## 2022-09-09 PROCEDURE — 88305 TISSUE EXAM BY PATHOLOGIST: ICD-10-PCS | Mod: 26,,, | Performed by: PATHOLOGY

## 2022-09-09 PROCEDURE — 45385 COLONOSCOPY W/LESION REMOVAL: CPT | Mod: PT | Performed by: INTERNAL MEDICINE

## 2022-09-09 PROCEDURE — 25000003 PHARM REV CODE 250: Performed by: NURSE ANESTHETIST, CERTIFIED REGISTERED

## 2022-09-09 PROCEDURE — 45385 COLONOSCOPY W/LESION REMOVAL: CPT | Mod: PT,,, | Performed by: INTERNAL MEDICINE

## 2022-09-09 PROCEDURE — 88305 TISSUE EXAM BY PATHOLOGIST: CPT | Performed by: PATHOLOGY

## 2022-09-09 RX ORDER — PROPOFOL 10 MG/ML
VIAL (ML) INTRAVENOUS
Status: DISCONTINUED | OUTPATIENT
Start: 2022-09-09 | End: 2022-09-09

## 2022-09-09 RX ORDER — SODIUM CHLORIDE 0.9 % (FLUSH) 0.9 %
10 SYRINGE (ML) INJECTION
Status: DISCONTINUED | OUTPATIENT
Start: 2022-09-09 | End: 2022-09-09 | Stop reason: HOSPADM

## 2022-09-09 RX ORDER — SODIUM CHLORIDE 0.9 % (FLUSH) 0.9 %
10 SYRINGE (ML) INJECTION
Status: CANCELLED | OUTPATIENT
Start: 2022-09-09

## 2022-09-09 RX ORDER — SODIUM CHLORIDE 9 MG/ML
INJECTION, SOLUTION INTRAVENOUS CONTINUOUS
Status: DISCONTINUED | OUTPATIENT
Start: 2022-09-09 | End: 2022-09-09 | Stop reason: HOSPADM

## 2022-09-09 RX ORDER — ONDANSETRON 2 MG/ML
INJECTION INTRAMUSCULAR; INTRAVENOUS
Status: DISCONTINUED | OUTPATIENT
Start: 2022-09-09 | End: 2022-09-09

## 2022-09-09 RX ORDER — PROPOFOL 10 MG/ML
VIAL (ML) INTRAVENOUS CONTINUOUS PRN
Status: DISCONTINUED | OUTPATIENT
Start: 2022-09-09 | End: 2022-09-09

## 2022-09-09 RX ORDER — SODIUM CHLORIDE 9 MG/ML
INJECTION, SOLUTION INTRAVENOUS CONTINUOUS PRN
Status: DISCONTINUED | OUTPATIENT
Start: 2022-09-09 | End: 2022-09-09

## 2022-09-09 RX ORDER — LIDOCAINE HCL/PF 100 MG/5ML
SYRINGE (ML) INTRAVENOUS
Status: DISCONTINUED | OUTPATIENT
Start: 2022-09-09 | End: 2022-09-09

## 2022-09-09 RX ORDER — SODIUM CHLORIDE 9 MG/ML
INJECTION, SOLUTION INTRAVENOUS CONTINUOUS
Status: CANCELLED | OUTPATIENT
Start: 2022-09-09

## 2022-09-09 RX ADMIN — PROPOFOL 150 MCG/KG/MIN: 10 INJECTION, EMULSION INTRAVENOUS at 08:09

## 2022-09-09 RX ADMIN — SODIUM CHLORIDE: 0.9 INJECTION, SOLUTION INTRAVENOUS at 08:09

## 2022-09-09 RX ADMIN — PROPOFOL 20 MG: 10 INJECTION, EMULSION INTRAVENOUS at 08:09

## 2022-09-09 RX ADMIN — PROPOFOL 50 MG: 10 INJECTION, EMULSION INTRAVENOUS at 08:09

## 2022-09-09 RX ADMIN — SODIUM CHLORIDE: 0.9 INJECTION, SOLUTION INTRAVENOUS at 07:09

## 2022-09-09 RX ADMIN — ONDANSETRON 4 MG: 2 INJECTION, SOLUTION INTRAMUSCULAR; INTRAVENOUS at 08:09

## 2022-09-09 RX ADMIN — PROPOFOL 30 MG: 10 INJECTION, EMULSION INTRAVENOUS at 08:09

## 2022-09-09 RX ADMIN — LIDOCAINE HYDROCHLORIDE 50 MG: 20 INJECTION, SOLUTION INTRAVENOUS at 08:09

## 2022-09-09 NOTE — PLAN OF CARE
Pt spoke with the DrZac About results and follow up care. All questions encouraged and answered at this time.

## 2022-09-09 NOTE — TRANSFER OF CARE
"Anesthesia Transfer of Care Note    Patient: Vivienne Jose    Procedure(s) Performed: Procedure(s) (LRB):  COLONOSCOPY Extended Suprep (N/A)    Patient location: GI    Anesthesia Type: MAC    Transport from OR: Transported from OR on room air with adequate spontaneous ventilation    Post pain: adequate analgesia    Post assessment: no apparent anesthetic complications and tolerated procedure well    Post vital signs: stable    Level of consciousness: awake, alert and oriented    Nausea/Vomiting: no nausea/vomiting    Complications: none    Transfer of care protocol was followed      Last vitals:   Visit Vitals  BP (!) 149/65 (BP Location: Left arm, Patient Position: Lying)   Pulse 77   Temp 36.6 °C (97.9 °F) (Temporal)   Resp 16   Ht 5' 2" (1.575 m)   Wt 109.3 kg (241 lb)   LMP  (LMP Unknown)   SpO2 (!) 93%   Breastfeeding No   BMI 44.08 kg/m²     "

## 2022-09-09 NOTE — PLAN OF CARE
Pt procedure completed.V/S are stable. No distress is noted at this time. Will continue to monitor.

## 2022-09-09 NOTE — ANESTHESIA POSTPROCEDURE EVALUATION
Anesthesia Post Evaluation    Patient: Vivienne Jose    Procedure(s) Performed: Procedure(s) (LRB):  COLONOSCOPY Extended Suprep (N/A)    Final Anesthesia Type: MAC      Patient location during evaluation: GI PACU  Patient participation: Yes- Able to Participate  Level of consciousness: awake and alert and oriented  Post-procedure vital signs: reviewed and stable  Pain management: adequate  Airway patency: patent    PONV status at discharge: No PONV  Anesthetic complications: no      Cardiovascular status: blood pressure returned to baseline and hemodynamically stable  Respiratory status: unassisted, spontaneous ventilation and room air  Hydration status: euvolemic  Follow-up not needed.          Vitals Value Taken Time   /73 09/09/22 0914   Temp 98.1 09/09/22 0914   Pulse 73 09/09/22 0914   Resp 16 09/09/22 0914   SpO2 97 09/09/22 0914         No case tracking events are documented in the log.      Pain/Gordo Score: No data recorded

## 2022-09-09 NOTE — PROVATION PATIENT INSTRUCTIONS
Discharge Summary/Instructions after an Endoscopic Procedure  Patient Name: Vivienne Jose  Patient MRN: [id number]  Patient YOB: 1950 Friday, September 9, 2022  Britt Jasso MD  Dear patient,  As a result of recent federal legislation (The Federal Cures Act), you may   receive lab or pathology results from your procedure in your [facility]   account before your physician is able to contact you. Your physician or   their representative will relay the results to you with their   recommendations at their soonest availability.  Thank you,  Your health is very important to us during the Covid Crisis. Following your   procedure today, you will receive a daily text for 2 weeks asking about   signs or symptoms of Covid 19.  Please respond to this text when you   receive it so we can follow up and keep you as safe as possible.   RESTRICTIONS:  During your procedure today, you received medications for sedation.  These   medications may affect your judgment, balance and coordination.  Therefore,   for 24 hours, you have the following restrictions:   - DO NOT drive a car, operate machinery, make legal/financial decisions,   sign important papers or drink alcohol.    ACTIVITY:  Today: no heavy lifting, straining or running due to procedural   sedation/anesthesia.  The following day: return to full activity including work.  DIET:  Eat and drink normally unless instructed otherwise.     TREATMENT FOR COMMON SIDE EFFECTS:  - Mild abdominal pain, nausea, belching, bloating or excessive gas:  rest,   eat lightly and use a heating pad.  - Sore Throat: treat with throat lozenges and/or gargle with warm salt   water.  - Because air was used during the procedure, expelling large amounts of air   from your rectum or belching is normal.  - If a bowel prep was taken, you may not have a bowel movement for 1-3 days.    This is normal.  SYMPTOMS TO WATCH FOR AND REPORT TO YOUR PHYSICIAN:  1. Abdominal pain or bloating,  other than gas cramps.  2. Chest pain.  3. Back pain.  4. Signs of infection such as: chills or fever occurring within 24 hours   after the procedure.  5. Rectal bleeding, which would show as bright red, maroon, or black stools.   (A tablespoon of blood from the rectum is not serious, especially if   hemorrhoids are present.)  6. Vomiting.  7. Weakness or dizziness.  GO DIRECTLY TO THE NEAREST EMERGENCY ROOM IF YOU HAVE ANY OF THE FOLLOWING:      Difficulty breathing              Chills and/or fever over 101 F   Persistent vomiting and/or vomiting blood   Severe abdominal pain   Severe chest pain   Black, tarry stools   Bleeding- more than one tablespoon   Any other symptom or condition that you feel may need urgent attention  Your doctor recommends these additional instructions:  If any biopsies were taken, your doctors clinic will contact you in 1 to 2   weeks with any results.  - Discharge patient to home.   - Resume previous diet.   - Continue present medications.   - Await pathology results.   - Repeat colonoscopy in 5 years for surveillance based on pathology results   and then stop.   - Patient has a contact number available for emergencies.  The signs and   symptoms of potential delayed complications were discussed with the   patient.  Return to normal activities tomorrow.  Written discharge   instructions were provided to the patient.  For questions, problems or results please call your physician - Britt Jasso MD.  EMERGENCY PHONE NUMBER: [phone],  LAB RESULTS: [phone]  IF A COMPLICATION OR EMERGENCY SITUATION ARISES AND YOU ARE UNABLE TO REACH   YOUR PHYSICIAN - GO DIRECTLY TO THE EMERGENCY ROOM.  Britt Jasso MD  9/9/2022 9:28:05 AM  This report has been verified and signed electronically.  Dear patient,  As a result of recent federal legislation (The Federal Cures Act), you may   receive lab or pathology results from your procedure in your [facility]   account  before your physician is able to contact you. Your physician or   their representative will relay the results to you with their   recommendations at their soonest availability.  Thank you,  PROVATION

## 2022-09-09 NOTE — PLAN OF CARE
Admission process complete. Patient ready for procedure. Plan of care reviewed with patient. Preoperative fasting appropriate for procedure and sedation. Call light in reach and bed in lowest position.

## 2022-09-09 NOTE — H&P
History & Physical - Short Stay  Gastroenterology      SUBJECTIVE:     Procedure: Colonoscopy    Chief Complaint/Indication for Procedure: screening    History of Present Illness:  Patient is a 72 y.o. female coming for screening colonoscopy.    PTA Medications   Medication Sig    amLODIPine (NORVASC) 5 MG tablet Take 1 tablet (5 mg total) by mouth every evening.    ascorbic acid, vitamin C, (VITAMIN C) 100 MG tablet Take 500 mg by mouth 2 (two) times daily.     aspirin (ECOTRIN) 81 MG EC tablet Take 81 mg by mouth once daily.    azelastine (ASTELIN) 137 mcg (0.1 %) nasal spray 2 sprays (274 mcg total) by Nasal route 2 (two) times daily.    blood sugar diagnostic (TRUE METRIX GLUCOSE TEST STRIP) Strp TEST TWO TIMES DAILY    blood-glucose meter Misc [employer] True Metrix Air meter    calcium carbonate-vitamin D3 600 mg(1,500mg) -100 unit Cap Take 1 tablet by mouth once daily.    ESTRING 2 mg (7.5 mcg /24 hour) vaginal ring INSERT 1 RING VAGINALLY AS DIRECTED, CHANGE EVERY 90 DAYS    fluticasone propionate (FLONASE) 50 mcg/actuation nasal spray 2 sprays (100 mcg total) by Each Nostril route once daily.    furosemide (LASIX) 20 MG tablet Take 1 tablet (20 mg total) by mouth once daily.    gabapentin (NEURONTIN) 600 MG tablet Take 1 tablet (600 mg total) by mouth 2 (two) times daily.    glipiZIDE (GLUCOTROL) 5 MG tablet TAKE 1 TABLET TWICE DAILY BEFORE A MEAL    insulin degludec (TRESIBA FLEXTOUCH U-100) 100 unit/mL (3 mL) insulin pen INJECT 38 UNITS INTO THE SKIN EVERY EVENING.    ketotifen (ZADITOR) 0.025 % (0.035 %) ophthalmic solution Place 1-2 drops into both eyes once daily.    L.acid-B.bifidum-B.animal-FOS 25 billion cell -100 mg Cap Take 1 capsule by mouth once daily.    lancets (TRUEPLUS LANCETS) 28 gauge [facility] Inject 1 lancet into the skin 2 (two) times daily before meals.    levothyroxine (SYNTHROID) 75 MCG tablet Take 1 tablet (75 mcg total) by mouth once daily.    loratadine (CLARITIN) 10 mg tablet Take 10 mg  "by mouth once daily.    lovastatin (MEVACOR) 40 MG tablet TAKE 1 TABLET NIGHTLY (SUBSTITUTED FOR MEVACOR)    magnesium oxide-Mg AA chelate (MG-PLUS-PROTEIN) 133 mg Tab Take by mouth as directed.    metFORMIN (GLUCOPHAGE) 1000 MG tablet Take 1 tablet (1,000 mg total) by mouth 2 (two) times daily with meals.    mv-mn/folic acid/vit [id number] (ALIVE ONCE DAILY WOMEN 50 PLUS ORAL) Take 1 tablet by mouth once daily.    omeprazole (PRILOSEC) 20 MG capsule TAKE 1 CAPSULE EVERY DAY AS NEEDED    pen needle, diabetic (BD ULTRA-FINE EDUARDO PEN NEEDLE) 32 gauge x 5/32" Ndle USE AS DIRECTED    semaglutide (OZEMPIC) 0.25 mg or 0.5 mg(2 mg/1.5 mL) pen injector Inject 0.5 mg into the skin every 7 days.    spironolactone (ALDACTONE) 50 MG tablet Take 1 tablet (50 mg total) by mouth once daily.    valACYclovir (VALTREX) 500 MG tablet Take 1 tablet (500 mg total) by mouth once daily.    valsartan (DIOVAN) 160 MG tablet Take 1 tablet (160 mg total) by mouth 2 (two) times daily.    amoxicillin-clavulanate 875-125mg (AUGMENTIN) 875-125 mg per tablet Take 1 tablet by mouth 2 (two) times daily.       Review of patient's allergies indicates:   Allergen Reactions    Sulfa (sulfonamide antibiotics) Nausea Only    Ciprofloxacin     Januvia [sitagliptin]      abd pain    Lisinopril     Lotensin [benazepril]     Nexium [esomeprazole magnesium] Other (See Comments)     Gas        Past Medical History:   Diagnosis Date    Allergy     Angio-edema     Arthritis     Cataract     bilateral - not removed    Cerebrovascular malformation     Cirrhosis 11/24/2020    Colon polyps     Coronary artery disease     Diabetes mellitus, type 2     Diabetic peripheral neuropathy     Edema of both feet 8/19/2022    GERD (gastroesophageal reflux disease)     Herpes infection     Hyperlipidemia     Hypertension     Hypothyroidism     Kidney stones     Mild nonproliferative diabetic retinopathy of 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 edema associated with type 2 diabetes mellitus " 4/18/2019    DEL RIO (nonalcoholic steatohepatitis) 8/19/2022    Nausea & vomiting 11/23/2015    Obesity, morbid     Ovarian cyst     Seizure disorder, focal motor     Sleep apnea     Type II or unspecified type diabetes mellitus with neurological manifestations, uncontrolled(250.62)     Urticaria      Past Surgical History:   Procedure Laterality Date    CARPAL TUNNEL RELEASE Right 2014    COLONOSCOPY      COLONOSCOPY N/A 9/13/2017    Procedure: COLONOSCOPY Golytely;  Surgeon: Gisela Wall MD;  Location: [facility];  Service: Endoscopy;  Laterality: N/A;    CYST REMOVAL      skin; multiples    ESOPHAGOGASTRODUODENOSCOPY Left 10/15/2021    Procedure: EGD (ESOPHAGOGASTRODUODENOSCOPY);  Surgeon: Luis Fernando Taveras MD;  Location: [facility] ([address] [place] [address]);  Service: Endoscopy;  Laterality: Left;  cirrhosis labwork am of procedure  last seizure 1973  COVID test on 10/12/21 at [facility]    EXTRACORPOREAL SHOCK WAVE LITHOTRIPSY  2002    HYSTERECTOMY  1984    JOINT REPLACEMENT Right 03/06/2019    knee    KIDNEY STONE SURGERY      KNEE ARTHROPLASTY Right 3/6/2019    Procedure: ARTHROPLASTY, KNEE;  Surgeon: Pj Gamboa MD;  Location: [facility] OR;  Service: Orthopedics;  Laterality: Right;  Depuy (Stephen notified 2/21, CC)    THYROIDECTOMY  1977         TONSILLECTOMY, ADENOIDECTOMY      TRIGGER FINGER RELEASE      TUBAL LIGATION       Family History   Problem Relation Age of Onset    Cancer Father     Arthritis Father     Gout Father     Allergies Son     Allergic rhinitis Son     Skin cancer Mother     Macular degeneration Mother     Dementia Mother     Hypertension Mother     No Known Problems Daughter     Diabetes Daughter     Hypertension Daughter     No Known Problems Daughter     Glaucoma Maternal Aunt         Great Maternal Aunt    Leukemia Maternal Uncle     Amblyopia Neg Hx     Cataracts Neg Hx     Retinal detachment Neg Hx     Strabismus Neg Hx     Stroke Neg Hx     Thyroid disease Neg Hx     Kidney disease Neg Hx      Angioedema Neg Hx     Asthma Neg Hx     Atopy Neg Hx     Eczema Neg Hx     Immunodeficiency Neg Hx     Rhinitis Neg Hx     Urticaria Neg Hx      Social History     Tobacco Use    Smoking status: Never    Smokeless tobacco: Never   Substance Use Topics    Alcohol use: No     Alcohol/week: 0.0 standard drinks    Drug use: No       OBJECTIVE:     Vital Signs (Most Recent)  Temp: 97.9 °F (36.6 °C) (09/09/22 0735)  Pulse: 77 (09/09/22 0735)  Resp: 16 (09/09/22 0735)  BP: (!) 149/65 (09/09/22 0735)  SpO2: (!) 93 % (09/09/22 0735)      ASSESSMENT/PLAN:       Patient is a 72 y.o. female coming for screening colonoscopy.    Plan: Colonoscopy    Anesthesia Plan: Moderate Sedation    ASA Grade: ASA 2 - Patient with mild systemic disease with no functional limitations

## 2022-09-09 NOTE — DISCHARGE INSTRUCTIONS
No driving for 24 hours. Await results from lab in 7-10 working days.Resume previous diet and continue present medications.

## 2022-09-09 NOTE — ANESTHESIA PREPROCEDURE EVALUATION
09/09/2022      Vivienne Jose is a 72 y.o. female here for C-scope      Patient Active Problem List   Diagnosis    Trigger middle finger of right hand    Kidney stones    Essential hypertension    CAD (coronary artery disease)    Chronic allergic rhinitis    Nuclear sclerosis - Both Eyes    Carpal tunnel syndrome of right wrist    Proteinuria    BAM (obstructive sleep apnea)    Sacroiliitis    Facet arthritis of lumbar region    Physical deconditioning    Osteopenia    Vitamin D deficiency    Hypocalcemia    Morbid obesity with body mass index (BMI) of 40.0 to 44.9 in adult    Recurrent HSV (herpes simplex virus)    Chronic pain of right knee    Gastroesophageal reflux disease    Hyperlipidemia    Nephrolithiasis    Hypokalemia    Diabetic polyneuropathy associated with type 2 diabetes mellitus    Diverticulosis of intestine without bleeding    Skin nodule    Facet arthritis of cervical region    Bilateral carotid artery disease    Type 2 diabetes mellitus with stage 3a chronic kidney disease, with long-term current use of insulin    Interstitial cystitis    S/P total knee arthroplasty, right    Decreased range of motion of right knee    Muscle weakness of lower extremity    Mild nonproliferative diabetic retinopathy of 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 edema associated with type 2 diabetes mellitus    Hypothyroidism    Thyroid nodule    Cirrhosis    Varices, esophageal    History of recurrent UTI (urinary tract infection)    De Quervain's tenosynovitis, left    DEL RIO (nonalcoholic steatohepatitis)    Edema of both feet         Past Surgical History:   Procedure Laterality Date    CARPAL TUNNEL RELEASE Right 2014    COLONOSCOPY      COLONOSCOPY N/A 9/13/2017    Procedure: COLONOSCOPY Golytely;  Surgeon: Gisela Wall MD;  Location: [facility];  Service: Endoscopy;   Laterality: N/A;    CYST REMOVAL      skin; multiples    ESOPHAGOGASTRODUODENOSCOPY Left 10/15/2021    Procedure: EGD (ESOPHAGOGASTRODUODENOSCOPY);  Surgeon: Luis Fernando Taveras MD;  Location: [facility] ([address] [place] [address]);  Service: Endoscopy;  Laterality: Left;  cirrhosis labwork am of procedure  last seizure 1973  C[facility] 10/12/21 at [facility]    EXTRACORPOREAL SHOCK WAVE LITHOTRIPSY  2002    HYSTERECTOMY  1984    JOINT REPLACEMENT Right 03/06/2019    knee    KIDNEY STONE SURGERY      KNEE ARTHROPLASTY Right 3/6/2019    Procedure: ARTHROPLASTY, KNEE;  Surgeon: Pj Gamboa MD;  Location: [facility];  Service: Orthopedics;  Laterality: Right;  Depuy (Stephen notified 2/21, CC)    THYROIDECTOMY  1977         TONSILLECTOMY, ADENOIDECTOMY      TRIGGER FINGER RELEASE      TUBAL LIGATION       2D Echo:  No results found. However, due to the size of the patient record, not all encounters were searched. Please check Results Review for a complete set of results.      Pre-op Assessment    I have reviewed the Patient Summary Reports.    I have reviewed the Nursing Notes.    I have reviewed the Medications.     Review of Systems  Anesthesia Hx:  Hx of Anesthetic complications  Personal Hx of Anesthesia complications, Post-Operative Nausea/Vomiting, in the past, but not with recent anesthetics / prophylaxis Slow To Awaken/Delayed Emergence and mild, somewhat sensitive to sedation / narcotics   Social:  Non-Smoker, No Alcohol Use    Hematology/Oncology:  Hematology Normal        EENT/Dental:   chronic allergic rhinitis   Cardiovascular:   Exercise tolerance: poor Hypertension CAD    Denies Angina. hyperlipidemia BRIONES (Normal stress test, per cardiology most likely r/t weight) ECG has been reviewed.  Functional Capacity low / < 4 METS, able to walk about a 1/2 block    Pulmonary:   Sleep Apnea, CPAP    Renal/:   renal calculi    Hepatic/GI:   GERD, well controlled Denies Liver Disease.    Neurological:  Seizure Disorder  (Motor focal seizures, stress induced, last seizure 1973)    Endocrine:   Hypothyroidism  Diabetes, Type 2 Diabetes , Complications include Diabetic Neuropathy , controlled by insulin, non-insulin injectables, diet.        Physical Exam  General:  Morbid Obesity      Airway/Jaw/Neck:  Airway Findings: Mouth Opening: Small, but > 3cm   Tongue: Normal   General Airway Assessment: Adult Mallampati: III      Dental:  Dental Findings: Periodontal disease, Severe     Chest/Lungs:  Chest/Lungs Findings: Clear to auscultation, Normal Respiratory Rate      Heart/Vascular:  Heart Findings: Rate: Normal  Rhythm: Regular Rhythm  Sounds: Normal  Heart murmur: negative            Anesthesia Plan  Type of Anesthesia, risks & benefits discussed:  Anesthesia Type:  general, MAC    Patient's Preference:   Plan Factors:          Intra-op Monitoring Plan: standard ASA monitors  Intra-op Monitoring Plan Comments:   Post Op Pain Control Plan: per primary service following discharge from PACU  Post Op Pain Control Plan Comments:     Induction:   IV  Beta Blocker:  Patient is not currently on a Beta-Blocker (No further documentation required).       Informed Consent: Informed consent signed with the Patient and all parties understand the risks and agree with anesthesia plan.  All questions answered.  Anesthesia consent signed with patient.  ASA Score: 3     Day of Surgery Review of History & Physical:              Ready For Surgery From Anesthesia Perspective.           Physical Exam  General: Morbid Obesity    Airway:  Mallampati: III   Mouth Opening: Small, but > 3cm  Tongue: Normal    Dental:  Periodontal disease, Severe    Chest/Lungs:  Clear to auscultation, Normal Respiratory Rate    Heart:  Rate: Normal  Rhythm: Regular Rhythm  Sounds: Normal          Anesthesia Plan  Type of Anesthesia, risks & benefits discussed:    Anesthesia Type: general, MAC  Intra-op Monitoring Plan: standard ASA monitors  Post Op Pain Control Plan: per  primary service following discharge from PACU  Induction:  IV  Informed Consent: Informed consent signed with the Patient and all parties understand the risks and agree with anesthesia plan.  All questions answered.   ASA Score: 3    Ready For Surgery From Anesthesia Perspective.       .

## 2022-09-13 LAB
FINAL PATHOLOGIC DIAGNOSIS: NORMAL
GROSS: NORMAL
Lab: NORMAL

## 2022-09-14 ENCOUNTER — LAB VISIT (OUTPATIENT)
Dept: LAB | Facility: HOSPITAL | Age: 72
End: 2022-09-14
Attending: INTERNAL MEDICINE
Payer: MEDICARE

## 2022-09-14 DIAGNOSIS — K74.69 OTHER CIRRHOSIS OF LIVER: ICD-10-CM

## 2022-09-14 LAB
AFP SERPL-MCNC: 9.6 NG/ML (ref 0–8.4)
ALBUMIN SERPL BCP-MCNC: 3.5 G/DL (ref 3.5–5.2)
ALP SERPL-CCNC: 86 U/L (ref 55–135)
ALT SERPL W/O P-5'-P-CCNC: 30 U/L (ref 10–44)
ANION GAP SERPL CALC-SCNC: 11 MMOL/L (ref 8–16)
AST SERPL-CCNC: 26 U/L (ref 10–40)
BASOPHILS # BLD AUTO: 0.06 K/UL (ref 0–0.2)
BASOPHILS NFR BLD: 0.6 % (ref 0–1.9)
BILIRUB SERPL-MCNC: 0.5 MG/DL (ref 0.1–1)
BUN SERPL-MCNC: 16 MG/DL (ref 8–23)
CALCIUM SERPL-MCNC: 9.9 MG/DL (ref 8.7–10.5)
CHLORIDE SERPL-SCNC: 104 MMOL/L (ref 95–110)
CO2 SERPL-SCNC: 26 MMOL/L (ref 23–29)
CREAT SERPL-MCNC: 1.1 MG/DL (ref 0.5–1.4)
DIFFERENTIAL METHOD: ABNORMAL
EOSINOPHIL # BLD AUTO: 0.5 K/UL (ref 0–0.5)
EOSINOPHIL NFR BLD: 4.9 % (ref 0–8)
ERYTHROCYTE [DISTWIDTH] IN BLOOD BY AUTOMATED COUNT: 14.6 % (ref 11.5–14.5)
EST. GFR  (NO RACE VARIABLE): 53.7 ML/MIN/1.73 M^2
GLUCOSE SERPL-MCNC: 187 MG/DL (ref 70–110)
HCT VFR BLD AUTO: 42.4 % (ref 37–48.5)
HGB BLD-MCNC: 14 G/DL (ref 12–16)
IMM GRANULOCYTES # BLD AUTO: 0.04 K/UL (ref 0–0.04)
IMM GRANULOCYTES NFR BLD AUTO: 0.4 % (ref 0–0.5)
INR PPP: 1 (ref 0.8–1.2)
LYMPHOCYTES # BLD AUTO: 2.7 K/UL (ref 1–4.8)
LYMPHOCYTES NFR BLD: 28.5 % (ref 18–48)
MCH RBC QN AUTO: 31.5 PG (ref 27–31)
MCHC RBC AUTO-ENTMCNC: 33 G/DL (ref 32–36)
MCV RBC AUTO: 95 FL (ref 82–98)
MONOCYTES # BLD AUTO: 0.9 K/UL (ref 0.3–1)
MONOCYTES NFR BLD: 9.4 % (ref 4–15)
NEUTROPHILS # BLD AUTO: 5.2 K/UL (ref 1.8–7.7)
NEUTROPHILS NFR BLD: 56.2 % (ref 38–73)
NRBC BLD-RTO: 0 /100 WBC
PLATELET # BLD AUTO: 276 K/UL (ref 150–450)
PMV BLD AUTO: 10.6 FL (ref 9.2–12.9)
POTASSIUM SERPL-SCNC: 4.5 MMOL/L (ref 3.5–5.1)
PROT SERPL-MCNC: 7.4 G/DL (ref 6–8.4)
PROTHROMBIN TIME: 10.4 SEC (ref 9–12.5)
RBC # BLD AUTO: 4.45 M/UL (ref 4–5.4)
SODIUM SERPL-SCNC: 141 MMOL/L (ref 136–145)
WBC # BLD AUTO: 9.32 K/UL (ref 3.9–12.7)

## 2022-09-14 PROCEDURE — 82105 ALPHA-FETOPROTEIN SERUM: CPT | Performed by: INTERNAL MEDICINE

## 2022-09-14 PROCEDURE — 85610 PROTHROMBIN TIME: CPT | Performed by: INTERNAL MEDICINE

## 2022-09-14 PROCEDURE — 85025 COMPLETE CBC W/AUTO DIFF WBC: CPT | Performed by: INTERNAL MEDICINE

## 2022-09-14 PROCEDURE — 36415 COLL VENOUS BLD VENIPUNCTURE: CPT | Mod: PO | Performed by: INTERNAL MEDICINE

## 2022-09-14 PROCEDURE — 80053 COMPREHEN METABOLIC PANEL: CPT | Performed by: INTERNAL MEDICINE

## 2022-09-21 ENCOUNTER — TELEPHONE (OUTPATIENT)
Dept: ADMINISTRATIVE | Facility: CLINIC | Age: 72
End: 2022-09-21
Payer: MEDICARE

## 2022-09-23 ENCOUNTER — CLINICAL SUPPORT (OUTPATIENT)
Dept: INTERNAL MEDICINE | Facility: CLINIC | Age: 72
End: 2022-09-23
Payer: MEDICARE

## 2022-09-23 ENCOUNTER — OFFICE VISIT (OUTPATIENT)
Dept: FAMILY MEDICINE | Facility: CLINIC | Age: 72
End: 2022-09-23
Payer: MEDICARE

## 2022-09-23 VITALS
DIASTOLIC BLOOD PRESSURE: 58 MMHG | SYSTOLIC BLOOD PRESSURE: 118 MMHG | HEART RATE: 72 BPM | WEIGHT: 242.75 LBS | OXYGEN SATURATION: 95 % | BODY MASS INDEX: 44.67 KG/M2 | HEIGHT: 62 IN

## 2022-09-23 DIAGNOSIS — I85.00 ESOPHAGEAL VARICES WITHOUT BLEEDING, UNSPECIFIED ESOPHAGEAL VARICES TYPE: ICD-10-CM

## 2022-09-23 DIAGNOSIS — N18.31 TYPE 2 DIABETES MELLITUS WITH STAGE 3A CHRONIC KIDNEY DISEASE, WITH LONG-TERM CURRENT USE OF INSULIN: ICD-10-CM

## 2022-09-23 DIAGNOSIS — M46.1 SACROILIITIS: ICD-10-CM

## 2022-09-23 DIAGNOSIS — M85.80 OSTEOPENIA, UNSPECIFIED LOCATION: ICD-10-CM

## 2022-09-23 DIAGNOSIS — J30.9 CHRONIC ALLERGIC RHINITIS: Primary | ICD-10-CM

## 2022-09-23 DIAGNOSIS — E03.9 HYPOTHYROIDISM, UNSPECIFIED TYPE: ICD-10-CM

## 2022-09-23 DIAGNOSIS — B00.9 RECURRENT HSV (HERPES SIMPLEX VIRUS): ICD-10-CM

## 2022-09-23 DIAGNOSIS — E55.9 VITAMIN D DEFICIENCY: ICD-10-CM

## 2022-09-23 DIAGNOSIS — Z79.4 TYPE 2 DIABETES MELLITUS WITH STAGE 3A CHRONIC KIDNEY DISEASE, WITH LONG-TERM CURRENT USE OF INSULIN: ICD-10-CM

## 2022-09-23 DIAGNOSIS — E11.3293 MILD NONPROLIFERATIVE DIABETIC RETINOPATHY OF BOTH EYES WITHOUT MACULAR EDEMA ASSOCIATED WITH TYPE 2 DIABETES MELLITUS: ICD-10-CM

## 2022-09-23 DIAGNOSIS — E11.22 TYPE 2 DIABETES MELLITUS WITH STAGE 3A CHRONIC KIDNEY DISEASE, WITH LONG-TERM CURRENT USE OF INSULIN: ICD-10-CM

## 2022-09-23 DIAGNOSIS — N30.10 INTERSTITIAL CYSTITIS: ICD-10-CM

## 2022-09-23 DIAGNOSIS — I77.9 BILATERAL CAROTID ARTERY DISEASE, UNSPECIFIED TYPE: ICD-10-CM

## 2022-09-23 DIAGNOSIS — E11.42 DIABETIC POLYNEUROPATHY ASSOCIATED WITH TYPE 2 DIABETES MELLITUS: ICD-10-CM

## 2022-09-23 DIAGNOSIS — K75.81 NASH (NONALCOHOLIC STEATOHEPATITIS): ICD-10-CM

## 2022-09-23 DIAGNOSIS — K74.60 CIRRHOSIS OF LIVER WITHOUT ASCITES, UNSPECIFIED HEPATIC CIRRHOSIS TYPE: ICD-10-CM

## 2022-09-23 DIAGNOSIS — I25.10 CORONARY ARTERY DISEASE INVOLVING NATIVE CORONARY ARTERY OF NATIVE HEART WITHOUT ANGINA PECTORIS: ICD-10-CM

## 2022-09-23 DIAGNOSIS — E78.5 HYPERLIPIDEMIA, UNSPECIFIED HYPERLIPIDEMIA TYPE: ICD-10-CM

## 2022-09-23 DIAGNOSIS — J30.9 CHRONIC ALLERGIC RHINITIS: ICD-10-CM

## 2022-09-23 DIAGNOSIS — I10 ESSENTIAL HYPERTENSION: ICD-10-CM

## 2022-09-23 DIAGNOSIS — Z00.00 ENCOUNTER FOR PREVENTIVE HEALTH EXAMINATION: Primary | ICD-10-CM

## 2022-09-23 DIAGNOSIS — K21.9 GASTROESOPHAGEAL REFLUX DISEASE, UNSPECIFIED WHETHER ESOPHAGITIS PRESENT: ICD-10-CM

## 2022-09-23 DIAGNOSIS — Z23 NEED FOR INFLUENZA VACCINATION: ICD-10-CM

## 2022-09-23 DIAGNOSIS — E66.01 MORBID OBESITY WITH BODY MASS INDEX (BMI) OF 40.0 TO 44.9 IN ADULT: ICD-10-CM

## 2022-09-23 PROCEDURE — 3288F FALL RISK ASSESSMENT DOCD: CPT | Mod: CPTII,S$GLB,, | Performed by: NURSE PRACTITIONER

## 2022-09-23 PROCEDURE — 99999 PR PBB SHADOW E&M-EST. PATIENT-LVL III: CPT | Mod: PBBFAC,,, | Performed by: NURSE PRACTITIONER

## 2022-09-23 PROCEDURE — 4010F ACE/ARB THERAPY RXD/TAKEN: CPT | Mod: CPTII,S$GLB,, | Performed by: NURSE PRACTITIONER

## 2022-09-23 PROCEDURE — 4010F PR ACE/ARB THEARPY RXD/TAKEN: ICD-10-PCS | Mod: CPTII,S$GLB,, | Performed by: NURSE PRACTITIONER

## 2022-09-23 PROCEDURE — 90694 FLU VACCINE - QUADRIVALENT - ADJUVANTED: ICD-10-PCS | Mod: S$GLB,,, | Performed by: NURSE PRACTITIONER

## 2022-09-23 PROCEDURE — 99499 NO LOS: ICD-10-PCS | Mod: S$GLB,,, | Performed by: ALLERGY & IMMUNOLOGY

## 2022-09-23 PROCEDURE — 3072F LOW RISK FOR RETINOPATHY: CPT | Mod: CPTII,S$GLB,, | Performed by: NURSE PRACTITIONER

## 2022-09-23 PROCEDURE — 3061F NEG MICROALBUMINURIA REV: CPT | Mod: CPTII,S$GLB,, | Performed by: NURSE PRACTITIONER

## 2022-09-23 PROCEDURE — 95115 IMMUNOTHERAPY ONE INJECTION: CPT | Mod: S$GLB,,, | Performed by: FAMILY MEDICINE

## 2022-09-23 PROCEDURE — 3074F SYST BP LT 130 MM HG: CPT | Mod: CPTII,S$GLB,, | Performed by: NURSE PRACTITIONER

## 2022-09-23 PROCEDURE — 3078F PR MOST RECENT DIASTOLIC BLOOD PRESSURE < 80 MM HG: ICD-10-PCS | Mod: CPTII,S$GLB,, | Performed by: NURSE PRACTITIONER

## 2022-09-23 PROCEDURE — 3066F PR DOCUMENTATION OF TREATMENT FOR NEPHROPATHY: ICD-10-PCS | Mod: CPTII,S$GLB,, | Performed by: NURSE PRACTITIONER

## 2022-09-23 PROCEDURE — 90694 VACC AIIV4 NO PRSRV 0.5ML IM: CPT | Mod: S$GLB,,, | Performed by: NURSE PRACTITIONER

## 2022-09-23 PROCEDURE — 1159F MED LIST DOCD IN RCRD: CPT | Mod: CPTII,S$GLB,, | Performed by: NURSE PRACTITIONER

## 2022-09-23 PROCEDURE — G0439 PR MEDICARE ANNUAL WELLNESS SUBSEQUENT VISIT: ICD-10-PCS | Mod: S$GLB,,, | Performed by: NURSE PRACTITIONER

## 2022-09-23 PROCEDURE — 1160F PR REVIEW ALL MEDS BY PRESCRIBER/CLIN PHARMACIST DOCUMENTED: ICD-10-PCS | Mod: CPTII,S$GLB,, | Performed by: NURSE PRACTITIONER

## 2022-09-23 PROCEDURE — 1101F PR PT FALLS ASSESS DOC 0-1 FALLS W/OUT INJ PAST YR: ICD-10-PCS | Mod: CPTII,S$GLB,, | Performed by: NURSE PRACTITIONER

## 2022-09-23 PROCEDURE — 3078F DIAST BP <80 MM HG: CPT | Mod: CPTII,S$GLB,, | Performed by: NURSE PRACTITIONER

## 2022-09-23 PROCEDURE — 3074F PR MOST RECENT SYSTOLIC BLOOD PRESSURE < 130 MM HG: ICD-10-PCS | Mod: CPTII,S$GLB,, | Performed by: NURSE PRACTITIONER

## 2022-09-23 PROCEDURE — 99499 UNLISTED E&M SERVICE: CPT | Mod: S$GLB,,, | Performed by: ALLERGY & IMMUNOLOGY

## 2022-09-23 PROCEDURE — 1160F RVW MEDS BY RX/DR IN RCRD: CPT | Mod: CPTII,S$GLB,, | Performed by: NURSE PRACTITIONER

## 2022-09-23 PROCEDURE — 99999 PR PBB SHADOW E&M-EST. PATIENT-LVL III: ICD-10-PCS | Mod: PBBFAC,,, | Performed by: NURSE PRACTITIONER

## 2022-09-23 PROCEDURE — G0008 ADMIN INFLUENZA VIRUS VAC: HCPCS | Mod: S$GLB,,, | Performed by: NURSE PRACTITIONER

## 2022-09-23 PROCEDURE — 3061F PR NEG MICROALBUMINURIA RESULT DOCUMENTED/REVIEW: ICD-10-PCS | Mod: CPTII,S$GLB,, | Performed by: NURSE PRACTITIONER

## 2022-09-23 PROCEDURE — 1159F PR MEDICATION LIST DOCUMENTED IN MEDICAL RECORD: ICD-10-PCS | Mod: CPTII,S$GLB,, | Performed by: NURSE PRACTITIONER

## 2022-09-23 PROCEDURE — G0439 PPPS, SUBSEQ VISIT: HCPCS | Mod: S$GLB,,, | Performed by: NURSE PRACTITIONER

## 2022-09-23 PROCEDURE — 3044F HG A1C LEVEL LT 7.0%: CPT | Mod: CPTII,S$GLB,, | Performed by: NURSE PRACTITIONER

## 2022-09-23 PROCEDURE — 95115 PR IMMUNOTHERAPY, ONE INJECTION: ICD-10-PCS | Mod: S$GLB,,, | Performed by: FAMILY MEDICINE

## 2022-09-23 PROCEDURE — 3008F PR BODY MASS INDEX (BMI) DOCUMENTED: ICD-10-PCS | Mod: CPTII,S$GLB,, | Performed by: NURSE PRACTITIONER

## 2022-09-23 PROCEDURE — 3044F PR MOST RECENT HEMOGLOBIN A1C LEVEL <7.0%: ICD-10-PCS | Mod: CPTII,S$GLB,, | Performed by: NURSE PRACTITIONER

## 2022-09-23 PROCEDURE — 3008F BODY MASS INDEX DOCD: CPT | Mod: CPTII,S$GLB,, | Performed by: NURSE PRACTITIONER

## 2022-09-23 PROCEDURE — 1157F PR ADVANCE CARE PLAN OR EQUIV PRESENT IN MEDICAL RECORD: ICD-10-PCS | Mod: CPTII,S$GLB,, | Performed by: NURSE PRACTITIONER

## 2022-09-23 PROCEDURE — 1101F PT FALLS ASSESS-DOCD LE1/YR: CPT | Mod: CPTII,S$GLB,, | Performed by: NURSE PRACTITIONER

## 2022-09-23 PROCEDURE — G0008 FLU VACCINE - QUADRIVALENT - ADJUVANTED: ICD-10-PCS | Mod: S$GLB,,, | Performed by: NURSE PRACTITIONER

## 2022-09-23 PROCEDURE — 1157F ADVNC CARE PLAN IN RCRD: CPT | Mod: CPTII,S$GLB,, | Performed by: NURSE PRACTITIONER

## 2022-09-23 PROCEDURE — 3288F PR FALLS RISK ASSESSMENT DOCUMENTED: ICD-10-PCS | Mod: CPTII,S$GLB,, | Performed by: NURSE PRACTITIONER

## 2022-09-23 PROCEDURE — 3066F NEPHROPATHY DOC TX: CPT | Mod: CPTII,S$GLB,, | Performed by: NURSE PRACTITIONER

## 2022-09-23 PROCEDURE — 3072F PR LOW RISK FOR RETINOPATHY: ICD-10-PCS | Mod: CPTII,S$GLB,, | Performed by: NURSE PRACTITIONER

## 2022-09-23 NOTE — PATIENT INSTRUCTIONS
Counseling and Referral of Other Preventative  (Italic type indicates deductible and co-insurance are waived)    Patient Name: Vivienne Jose  Today's Date: 9/23/2022    Health Maintenance       Date Due Completion Date    Influenza Vaccine (1) 09/01/2022 10/14/2021    Override on 11/16/2018: Done    Override on 9/14/2017: Done    COVID-19 Vaccine (3 - Moderna risk series) 09/12/2022 8/15/2022    Mammogram 10/11/2022 10/11/2021    Foot Exam 01/14/2023 1/14/2022    Override on 8/2/2017: Done    Override on 4/28/2015: Done    Hemoglobin A1c 02/04/2023 8/4/2022    Lipid Panel 02/07/2023 2/7/2022    Eye Exam 05/18/2023 5/18/2022    DEXA Scan 05/28/2023 5/28/2020    Diabetes Urine Screening 08/11/2023 8/11/2022    TETANUS VACCINE 06/19/2024 6/19/2014    Colorectal Cancer Screening 09/09/2027 9/9/2022        Orders Placed This Encounter   Procedures    Influenza (FLUAD) - Quadrivalent (Adjuvanted) *Preferred* (65+) (PF)     The following information is provided to all patients.  This information is to help you find resources for any of the problems found today that may be affecting your health:                Living healthy guide: www.[facility].[place].gov      Understanding Diabetes: www.diabetes.org      Eating healthy: www.cdc.gov/healthyweight      CDC home safety checklist: www.cdc.gov/steadi/patient.html      Agency on Aging: www.goea.[place].[facility]      Alcoholics anonymous (AA): www.aa.org      Physical Activity: www.leno.nih.gov/ni7ecge      Tobacco use: www.quitwithusla.org

## 2022-09-23 NOTE — PROGRESS NOTES
"  Vivienne Jose presented for a  Medicare AWV and comprehensive Health Risk Assessment today. The following components were reviewed and updated:    Medical history  Family History  Social history  Allergies and Current Medications  Health Risk Assessment  Health Maintenance  Care Team         ** See Completed Assessments for Annual Wellness Visit within the encounter summary.**         The following assessments were completed:  Living Situation  CAGE  Depression Screening  Timed Get Up and Go  Whisper Test  Cognitive Function Screening        Nutrition Screening  ADL Screening  PAQ Screening        Vitals:    09/23/22 1448   BP: (!) 118/58   BP Location: Right arm   Patient Position: Sitting   BP Method: Large (Manual)   Pulse: 72   SpO2: 95%   Weight: 110.1 kg (242 lb 11.6 oz)   Height: 5' 2" (1.575 m)     Body mass index is 44.4 kg/m².    Physical Exam  Vitals reviewed.   Constitutional:       General: She is awake. She is not in acute distress.     Appearance: Normal appearance. She is well-developed and well-groomed. She is morbidly obese.   HENT:      Head: Normocephalic.   Cardiovascular:      Rate and Rhythm: Normal rate.   Pulmonary:      Effort: Pulmonary effort is normal. No respiratory distress.   Skin:     General: Skin is warm and dry.      Coloration: Skin is not pale.   Neurological:      Mental Status: She is alert and oriented to person, place, and time.   Psychiatric:         Attention and Perception: Attention normal.         Mood and Affect: Mood and affect normal.         Speech: Speech normal.         Behavior: Behavior normal. Behavior is cooperative.         Thought Content: Thought content normal.           Diagnoses and health risks identified today and associated recommendations/orders:    1. Encounter for preventive health examination    2. Type 2 diabetes mellitus with stage 3a chronic kidney disease, with long-term current use of insulin  Chronic; stable on medication. Follow up with " PCP.    3. Diabetic polyneuropathy associated with type 2 diabetes mellitus  Chronic; stable on medication. Follow up with PCP.    4. Mild nonproliferative diabetic retinopathy of 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 edema associated with type 2 diabetes mellitus  Chronic; stable on medication. Followed by Optometry.    5. Cirrhosis of liver without ascites, unspecified hepatic cirrhosis type  Chronic; stable. Followed by Hepatology.    6. Bilateral carotid artery disease, unspecified type  Chronic; stable on medication. Follow up with PCP.    7. Coronary artery disease involving native coronary artery of native heart without angina pectoris  Chronic; stable on medication. Follow up with PCP.    8. Essential hypertension  Chronic; stable on medication. Follow up with PCP.    9. Hyperlipidemia, unspecified hyperlipidemia type  Chronic; stable on medication. Follow up with PCP.    10. Sacroiliitis  Chronic; stable on medication. Follow up with PCP.    11. Esophageal varices without bleeding, unspecified esophageal varices type  Chronic; stable. Followed by Hepatology.    12. DEL RIO (nonalcoholic steatohepatitis)  Chronic; stable. Followed by Hepatology.    13. Hypothyroidism, unspecified type  Chronic; stable on medication. Follow up with PCP.    14. Interstitial cystitis  Chronic; stable on medication. Follow up with PCP.    15. Recurrent HSV (herpes simplex virus)  Chronic; stable on medication. Follow up with PCP.    16. Gastroesophageal reflux disease, unspecified whether esophagitis present  Chronic; stable on medication. Follow up with PCP.    17. Osteopenia, unspecified location  Chronic; stable on medication. Follow up with PCP.    18. Vitamin D deficiency  Chronic; stable on medication. Follow up with PCP.    19. Chronic allergic rhinitis  Chronic; stable on medication. Follow up with PCP.    20. Need for influenza vaccination  - Influenza (FLUAD) - Quadrivalent (Adjuvanted) *Preferred* (65+) (PF)    21. Morbid obesity  with body mass index (BMI) of 40.0 to 44.9 in adult  Encouraged patient to continue to eat a low salt/low fat ADA diet and discussed importance of engaging in physical activity at least 5x/week for a minimum of 30 min/day.      Provided Vivienne with a 5-10 year written screening schedule and personal prevention plan. Recommendations were developed using the USPSTF age appropriate recommendations. Education, counseling, and referrals were provided as needed. After Visit Summary printed and given to patient which includes a list of additional screenings/tests needed.    Follow up for your next annual wellness visit.    Leta Adair NP      I offered to discuss advanced care planning, including how to pick a person who would make decisions for you if you were unable to make them for yourself, called a health care power of , and what kind of decisions you might make such as use of life sustaining treatments such as ventilators and tube feeding when faced with a life limiting illness recorded on a living will that they will need to know. (How you want to be cared for as you near the end of your natural life)     X  Patient has advanced directives on file, which we reviewed, and they do not wish to make changes.

## 2022-09-30 PROCEDURE — 95165 ANTIGEN THERAPY SERVICES: CPT | Mod: HCNC,S$GLB,, | Performed by: ALLERGY & IMMUNOLOGY

## 2022-09-30 PROCEDURE — 95165 PR PROFES SVC,IMMUNOTHER,SINGLE/MULT AGS: ICD-10-PCS | Mod: HCNC,S$GLB,, | Performed by: ALLERGY & IMMUNOLOGY

## 2022-10-06 DIAGNOSIS — N39.0 RECURRENT UTI: ICD-10-CM

## 2022-10-06 DIAGNOSIS — N95.2 VAGINAL ATROPHY: ICD-10-CM

## 2022-10-07 RX ORDER — ESTRADIOL 2 MG/1
SYSTEM VAGINAL
Qty: 1 EACH | Refills: 0 | Status: SHIPPED | OUTPATIENT
Start: 2022-10-07 | End: 2022-10-14 | Stop reason: SDUPTHER

## 2022-10-14 ENCOUNTER — HOSPITAL ENCOUNTER (OUTPATIENT)
Dept: RADIOLOGY | Facility: HOSPITAL | Age: 72
Discharge: HOME | End: 2022-10-14
Attending: INTERNAL MEDICINE
Payer: MEDICARE

## 2022-10-14 ENCOUNTER — OFFICE VISIT (OUTPATIENT)
Dept: UROLOGY | Facility: CLINIC | Age: 72
End: 2022-10-14
Payer: MEDICARE

## 2022-10-14 ENCOUNTER — LAB VISIT (OUTPATIENT)
Dept: LAB | Facility: HOSPITAL | Age: 72
End: 2022-10-14
Attending: INTERNAL MEDICINE
Payer: MEDICARE

## 2022-10-14 VITALS
HEIGHT: 62 IN | HEART RATE: 54 BPM | BODY MASS INDEX: 44.63 KG/M2 | DIASTOLIC BLOOD PRESSURE: 52 MMHG | SYSTOLIC BLOOD PRESSURE: 114 MMHG | WEIGHT: 242.5 LBS

## 2022-10-14 DIAGNOSIS — Z12.31 SCREENING MAMMOGRAM FOR BREAST CANCER: ICD-10-CM

## 2022-10-14 DIAGNOSIS — K74.60 HEPATIC CIRRHOSIS, UNSPECIFIED HEPATIC CIRRHOSIS TYPE, UNSPECIFIED WHETHER ASCITES PRESENT: ICD-10-CM

## 2022-10-14 DIAGNOSIS — N95.2 VAGINAL ATROPHY: ICD-10-CM

## 2022-10-14 DIAGNOSIS — N39.0 RECURRENT UTI: ICD-10-CM

## 2022-10-14 DIAGNOSIS — K74.69 OTHER CIRRHOSIS OF LIVER: ICD-10-CM

## 2022-10-14 LAB
AFP SERPL-MCNC: 8.9 NG/ML (ref 0–8.4)
ALBUMIN SERPL BCP-MCNC: 3.5 G/DL (ref 3.5–5.2)
ALP SERPL-CCNC: 94 U/L (ref 55–135)
ALT SERPL W/O P-5'-P-CCNC: 22 U/L (ref 10–44)
ANION GAP SERPL CALC-SCNC: 10 MMOL/L (ref 8–16)
AST SERPL-CCNC: 26 U/L (ref 10–40)
BASOPHILS # BLD AUTO: 0.05 K/UL (ref 0–0.2)
BASOPHILS NFR BLD: 0.5 % (ref 0–1.9)
BILIRUB SERPL-MCNC: 0.5 MG/DL (ref 0.1–1)
BUN SERPL-MCNC: 21 MG/DL (ref 8–23)
CALCIUM SERPL-MCNC: 9.7 MG/DL (ref 8.7–10.5)
CHLORIDE SERPL-SCNC: 103 MMOL/L (ref 95–110)
CO2 SERPL-SCNC: 27 MMOL/L (ref 23–29)
CREAT SERPL-MCNC: 0.9 MG/DL (ref 0.5–1.4)
DIFFERENTIAL METHOD: ABNORMAL
EOSINOPHIL # BLD AUTO: 0.3 K/UL (ref 0–0.5)
EOSINOPHIL NFR BLD: 3 % (ref 0–8)
ERYTHROCYTE [DISTWIDTH] IN BLOOD BY AUTOMATED COUNT: 14.5 % (ref 11.5–14.5)
EST. GFR  (NO RACE VARIABLE): >60 ML/MIN/1.73 M^2
GLUCOSE SERPL-MCNC: 136 MG/DL (ref 70–110)
HCT VFR BLD AUTO: 42.3 % (ref 37–48.5)
HGB BLD-MCNC: 13.5 G/DL (ref 12–16)
IMM GRANULOCYTES # BLD AUTO: 0.04 K/UL (ref 0–0.04)
IMM GRANULOCYTES NFR BLD AUTO: 0.4 % (ref 0–0.5)
INR PPP: 1 (ref 0.8–1.2)
LYMPHOCYTES # BLD AUTO: 2.3 K/UL (ref 1–4.8)
LYMPHOCYTES NFR BLD: 24.4 % (ref 18–48)
MCH RBC QN AUTO: 31.8 PG (ref 27–31)
MCHC RBC AUTO-ENTMCNC: 31.9 G/DL (ref 32–36)
MCV RBC AUTO: 100 FL (ref 82–98)
MONOCYTES # BLD AUTO: 1 K/UL (ref 0.3–1)
MONOCYTES NFR BLD: 10.2 % (ref 4–15)
NEUTROPHILS # BLD AUTO: 5.7 K/UL (ref 1.8–7.7)
NEUTROPHILS NFR BLD: 61.5 % (ref 38–73)
NRBC BLD-RTO: 0 /100 WBC
PLATELET # BLD AUTO: 278 K/UL (ref 150–450)
PMV BLD AUTO: 10.2 FL (ref 9.2–12.9)
POTASSIUM SERPL-SCNC: 4.7 MMOL/L (ref 3.5–5.1)
PROT SERPL-MCNC: 7.8 G/DL (ref 6–8.4)
PROTHROMBIN TIME: 10.3 SEC (ref 9–12.5)
RBC # BLD AUTO: 4.25 M/UL (ref 4–5.4)
SODIUM SERPL-SCNC: 140 MMOL/L (ref 136–145)
WBC # BLD AUTO: 9.31 K/UL (ref 3.9–12.7)

## 2022-10-14 PROCEDURE — 77067 MAMMO DIGITAL SCREENING BILAT WITH TOMO: ICD-10-PCS | Mod: 26,,, | Performed by: RADIOLOGY

## 2022-10-14 PROCEDURE — 36415 COLL VENOUS BLD VENIPUNCTURE: CPT | Mod: PO | Performed by: INTERNAL MEDICINE

## 2022-10-14 PROCEDURE — 85610 PROTHROMBIN TIME: CPT | Performed by: INTERNAL MEDICINE

## 2022-10-14 PROCEDURE — 1125F AMNT PAIN NOTED PAIN PRSNT: CPT | Mod: CPTII,S$GLB,, | Performed by: UROLOGY

## 2022-10-14 PROCEDURE — 99215 OFFICE O/P EST HI 40 MIN: CPT | Mod: S$GLB,,, | Performed by: UROLOGY

## 2022-10-14 PROCEDURE — 3066F PR DOCUMENTATION OF TREATMENT FOR NEPHROPATHY: ICD-10-PCS | Mod: CPTII,S$GLB,, | Performed by: UROLOGY

## 2022-10-14 PROCEDURE — 77067 SCR MAMMO BI INCL CAD: CPT | Mod: TC

## 2022-10-14 PROCEDURE — 3078F PR MOST RECENT DIASTOLIC BLOOD PRESSURE < 80 MM HG: ICD-10-PCS | Mod: CPTII,S$GLB,, | Performed by: UROLOGY

## 2022-10-14 PROCEDURE — 3044F PR MOST RECENT HEMOGLOBIN A1C LEVEL <7.0%: ICD-10-PCS | Mod: CPTII,S$GLB,, | Performed by: UROLOGY

## 2022-10-14 PROCEDURE — 1101F PT FALLS ASSESS-DOCD LE1/YR: CPT | Mod: CPTII,S$GLB,, | Performed by: UROLOGY

## 2022-10-14 PROCEDURE — 3288F FALL RISK ASSESSMENT DOCD: CPT | Mod: CPTII,S$GLB,, | Performed by: UROLOGY

## 2022-10-14 PROCEDURE — 1159F PR MEDICATION LIST DOCUMENTED IN MEDICAL RECORD: ICD-10-PCS | Mod: CPTII,S$GLB,, | Performed by: UROLOGY

## 2022-10-14 PROCEDURE — 1157F PR ADVANCE CARE PLAN OR EQUIV PRESENT IN MEDICAL RECORD: ICD-10-PCS | Mod: CPTII,S$GLB,, | Performed by: UROLOGY

## 2022-10-14 PROCEDURE — 99999 PR PBB SHADOW E&M-EST. PATIENT-LVL IV: CPT | Mod: PBBFAC,,, | Performed by: UROLOGY

## 2022-10-14 PROCEDURE — 1125F PR PAIN SEVERITY QUANTIFIED, PAIN PRESENT: ICD-10-PCS | Mod: CPTII,S$GLB,, | Performed by: UROLOGY

## 2022-10-14 PROCEDURE — 99999 PR PBB SHADOW E&M-EST. PATIENT-LVL IV: ICD-10-PCS | Mod: PBBFAC,,, | Performed by: UROLOGY

## 2022-10-14 PROCEDURE — 1101F PR PT FALLS ASSESS DOC 0-1 FALLS W/OUT INJ PAST YR: ICD-10-PCS | Mod: CPTII,S$GLB,, | Performed by: UROLOGY

## 2022-10-14 PROCEDURE — 3072F LOW RISK FOR RETINOPATHY: CPT | Mod: CPTII,S$GLB,, | Performed by: UROLOGY

## 2022-10-14 PROCEDURE — 3066F NEPHROPATHY DOC TX: CPT | Mod: CPTII,S$GLB,, | Performed by: UROLOGY

## 2022-10-14 PROCEDURE — 80053 COMPREHEN METABOLIC PANEL: CPT | Performed by: INTERNAL MEDICINE

## 2022-10-14 PROCEDURE — 3074F SYST BP LT 130 MM HG: CPT | Mod: CPTII,S$GLB,, | Performed by: UROLOGY

## 2022-10-14 PROCEDURE — 3044F HG A1C LEVEL LT 7.0%: CPT | Mod: CPTII,S$GLB,, | Performed by: UROLOGY

## 2022-10-14 PROCEDURE — 77067 SCR MAMMO BI INCL CAD: CPT | Mod: 26,,, | Performed by: RADIOLOGY

## 2022-10-14 PROCEDURE — 1159F MED LIST DOCD IN RCRD: CPT | Mod: CPTII,S$GLB,, | Performed by: UROLOGY

## 2022-10-14 PROCEDURE — 77063 BREAST TOMOSYNTHESIS BI: CPT | Mod: TC

## 2022-10-14 PROCEDURE — 3072F PR LOW RISK FOR RETINOPATHY: ICD-10-PCS | Mod: CPTII,S$GLB,, | Performed by: UROLOGY

## 2022-10-14 PROCEDURE — 4010F ACE/ARB THERAPY RXD/TAKEN: CPT | Mod: CPTII,S$GLB,, | Performed by: UROLOGY

## 2022-10-14 PROCEDURE — 77063 MAMMO DIGITAL SCREENING BILAT WITH TOMO: ICD-10-PCS | Mod: 26,,, | Performed by: RADIOLOGY

## 2022-10-14 PROCEDURE — 3061F PR NEG MICROALBUMINURIA RESULT DOCUMENTED/REVIEW: ICD-10-PCS | Mod: CPTII,S$GLB,, | Performed by: UROLOGY

## 2022-10-14 PROCEDURE — 99215 PR OFFICE/OUTPT VISIT, EST, LEVL V, 40-54 MIN: ICD-10-PCS | Mod: S$GLB,,, | Performed by: UROLOGY

## 2022-10-14 PROCEDURE — 3074F PR MOST RECENT SYSTOLIC BLOOD PRESSURE < 130 MM HG: ICD-10-PCS | Mod: CPTII,S$GLB,, | Performed by: UROLOGY

## 2022-10-14 PROCEDURE — 3288F PR FALLS RISK ASSESSMENT DOCUMENTED: ICD-10-PCS | Mod: CPTII,S$GLB,, | Performed by: UROLOGY

## 2022-10-14 PROCEDURE — 85025 COMPLETE CBC W/AUTO DIFF WBC: CPT | Performed by: INTERNAL MEDICINE

## 2022-10-14 PROCEDURE — 3078F DIAST BP <80 MM HG: CPT | Mod: CPTII,S$GLB,, | Performed by: UROLOGY

## 2022-10-14 PROCEDURE — 3061F NEG MICROALBUMINURIA REV: CPT | Mod: CPTII,S$GLB,, | Performed by: UROLOGY

## 2022-10-14 PROCEDURE — 77063 BREAST TOMOSYNTHESIS BI: CPT | Mod: 26,,, | Performed by: RADIOLOGY

## 2022-10-14 PROCEDURE — 82105 ALPHA-FETOPROTEIN SERUM: CPT | Performed by: INTERNAL MEDICINE

## 2022-10-14 PROCEDURE — 4010F PR ACE/ARB THEARPY RXD/TAKEN: ICD-10-PCS | Mod: CPTII,S$GLB,, | Performed by: UROLOGY

## 2022-10-14 PROCEDURE — 1157F ADVNC CARE PLAN IN RCRD: CPT | Mod: CPTII,S$GLB,, | Performed by: UROLOGY

## 2022-10-14 RX ORDER — ESTRADIOL 2 MG/1
SYSTEM VAGINAL
Qty: 1 EACH | Refills: 3 | Status: SHIPPED | OUTPATIENT
Start: 2022-10-14 | End: 2023-12-13 | Stop reason: SDUPTHER

## 2022-10-14 NOTE — PROGRESS NOTES
CHIEF COMPLAINT:    Mrs. Jose is a 72 y.o. female presenting for a consultation at the request of  ***. Patient presents with ***.    PRESENTING ILLNESS:    Vivienne Jose is a 72 y.o. female ***    REVIEW OF SYSTEMS:    ROS    PATIENT HISTORY:    Past Medical History:   Diagnosis Date    Allergy     Angio-edema     Arthritis     Cataract     bilateral - not removed    Cerebrovascular malformation     Cirrhosis 11/24/2020    Colon polyps     Coronary artery disease     Diabetes mellitus, type 2     Diabetic peripheral neuropathy     Edema of both feet 8/19/2022    GERD (gastroesophageal reflux disease)     Herpes infection     Hyperlipidemia     Hypertension     Hypothyroidism     Kidney stones     Mild nonproliferative diabetic retinopathy of 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 edema associated with type 2 diabetes mellitus 4/18/2019    DEL RIO (nonalcoholic steatohepatitis) 8/19/2022    Nausea & vomiting 11/23/2015    Obesity, morbid     Ovarian cyst     Seizure disorder, focal motor     Sleep apnea     Type II or unspecified type diabetes mellitus with neurological manifestations, uncontrolled(250.62)     Urticaria        Past Surgical History:   Procedure Laterality Date    CARPAL TUNNEL RELEASE Right 2014    COLONOSCOPY      COLONOSCOPY N/A 9/13/2017    Procedure: COLONOSCOPY Golytely;  Surgeon: Gisela Wall MD;  Location: [facility];  Service: Endoscopy;  Laterality: N/A;    COLONOSCOPY N/A 9/9/2022    Procedure: COLONOSCOPY Extended Suprep;  Surgeon: Britt Jasso MD;  Location: [facility];  Service: Endoscopy;  Laterality: N/A;    CYST REMOVAL      skin; multiples    ESOPHAGOGASTRODUODENOSCOPY Left 10/15/2021    Procedure: EGD (ESOPHAGOGASTRODUODENOSCOPY);  Surgeon: Luis Fernando Taveras MD;  Location: [address]);  Service: Endoscopy;  Laterality: Left;  cirrhosis labwork am of procedure  last seizure 1973  COVID test on 10/12/21 at [facility]    EXTRACORPOREAL SHOCK WAVE LITHOTRIPSY  2002     HYSTERECTOMY  1984    JOINT REPLACEMENT Right 03/06/2019    knee    KIDNEY STONE SURGERY      KNEE ARTHROPLASTY Right 3/6/2019    Procedure: ARTHROPLASTY, KNEE;  Surgeon: Pj Gamboa MD;  Location: [facility];  Service: Orthopedics;  Laterality: Right;  Depuy (Stephen notified 2/21, CC)    THYROIDECTOMY  1977         TONSILLECTOMY, ADENOIDECTOMY      TRIGGER FINGER RELEASE      TUBAL LIGATION         Family History   Problem Relation Age of Onset    Cancer Father     Arthritis Father     Gout Father     Allergies Son     Allergic rhinitis Son     Skin cancer Mother     Macular degeneration Mother     Dementia Mother     Hypertension Mother     No Known Problems Daughter     Diabetes Daughter     Hypertension Daughter     No Known Problems Daughter     Glaucoma Maternal Aunt         Great Maternal Aunt    Leukemia Maternal Uncle      Social History     Socioeconomic History    Marital status:    Occupational History    Occupation: retired     Employer: [employer]   Tobacco Use    Smoking status: Never    Smokeless tobacco: Never   Substance and Sexual Activity    Alcohol use: No     Alcohol/week: 0.0 standard drinks    Drug use: No    Sexual activity: Yes     Partners: Male     Social Determinants of Health     Financial Resource Strain: Low Risk     Difficulty of Paying Living Expenses: Not hard at all   Food Insecurity: No Food Insecurity    Worried About Running Out of Food in the Last Year: Never true    Ran Out of Food in the Last Year: Never true   Transportation Needs: No Transportation Needs    Lack of Transportation (Medical): No    Lack of Transportation (Non-Medical): No   Physical Activity: Inactive    Days of Exercise per Week: 0 days    Minutes of Exercise per Session: 0 min   Stress: Stress Concern Present    Feeling of Stress : To some extent   Social Connections: Unknown    Frequency of Communication with Friends and Family: Once a week    Frequency of Social Gatherings with  Friends and Family: Patient refused    Active Member of Clubs or Organizations: No    Attends Club or Organization Meetings: Never    Marital Status:    Housing Stability: Low Risk     Unable to Pay for Housing in the Last Year: No    Number of Places Lived in the Last Year: 1    Unstable Housing in the Last Year: No       Allergies:  Sulfa (sulfonamide antibiotics), Ciprofloxacin, Januvia [sitagliptin], Lisinopril, Lotensin [benazepril], and Nexium [esomeprazole magnesium]    Medications:  Outpatient Encounter Medications as of 10/14/2022   Medication Sig Dispense Refill    amLODIPine (NORVASC) 5 MG tablet Take 1 tablet (5 mg total) by mouth every evening. 90 tablet 1    ascorbic acid, vitamin C, (VITAMIN C) 100 MG tablet Take 500 mg by mouth 2 (two) times daily.       aspirin (ECOTRIN) 81 MG EC tablet Take 81 mg by mouth once daily.      azelastine (ASTELIN) 137 mcg (0.1 %) nasal spray 2 sprays (274 mcg total) by Nasal route 2 (two) times daily. 120 mL 4    blood sugar diagnostic (TRUE METRIX GLUCOSE TEST STRIP) Strp TEST TWO TIMES DAILY 200 strip 6    blood-glucose meter Misc [employer] True Metrix Air meter 1 each 0    calcium carbonate-vitamin D3 600 mg(1,500mg) -100 unit Cap Take 1 tablet by mouth once daily.      estradioL (ESTRING) 2 mg (7.5 mcg /24 hour) vaginal ring INSERT 1 RING VAGINALLY AS DIRECTED, CHANGE EVERY 90 DAYS 1 each 0    fluticasone propionate (FLONASE) 50 mcg/actuation nasal spray 2 sprays (100 mcg total) by Each Nostril route once daily. 48 g 4    furosemide (LASIX) 20 MG tablet Take 1 tablet (20 mg total) by mouth once daily. 30 tablet 11    gabapentin (NEURONTIN) 600 MG tablet TAKE 1 TABLET TWICE DAILY 180 tablet 0    glipiZIDE (GLUCOTROL) 5 MG tablet TAKE 1 TABLET TWICE DAILY BEFORE A MEAL 180 tablet 3    insulin degludec (TRESIBA FLEXTOUCH U-100) 100 unit/mL (3 mL) insulin pen INJECT 38 UNITS INTO THE SKIN EVERY EVENING. 10 pen 2    ketotifen (ZADITOR) 0.025 % (0.035 %) ophthalmic  "solution Place 1-2 drops into both eyes once daily.      L.acid-B.bifidum-B.animal-FOS 25 billion cell -100 mg Cap Take 1 capsule by mouth once daily.      lancets (TRUEPLUS LANCETS) 28 gauge Misc Inject 1 lancet into the skin 2 (two) times daily before meals. 200 each 6    levothyroxine (SYNTHROID) 75 MCG tablet TAKE 1 TABLET EVERY DAY 90 tablet 0    loratadine (CLARITIN) 10 mg tablet Take 10 mg by mouth once daily.      lovastatin (MEVACOR) 40 MG tablet TAKE 1 TABLET NIGHTLY (SUBSTITUTED FOR MEVACOR) 90 tablet 2    magnesium oxide-Mg AA chelate (MG-PLUS-PROTEIN) 133 mg Tab Take by mouth as directed.      metFORMIN (GLUCOPHAGE) 1000 MG tablet TAKE 1 TABLET TWICE DAILY WITH MEALS 180 tablet 0    mv-mn/folic acid/vit K/[id number] (ALIVE ONCE DAILY WOMEN 50 PLUS ORAL) Take 1 tablet by mouth once daily.      omeprazole (PRILOSEC) 20 MG capsule TAKE 1 CAPSULE EVERY DAY AS NEEDED 90 capsule 0    pen needle, diabetic (BD ULTRA-FINE EDUARDO PEN NEEDLE) 32 gauge x 5/32" Ndle USE AS DIRECTED 200 each 6    semaglutide (OZEMPIC) 0.25 mg or 0.5 mg(2 mg/1.5 mL) pen injector Inject 0.5 mg into the skin every 7 days. 3 pen 4    spironolactone (ALDACTONE) 50 MG tablet Take 1 tablet (50 mg total) by mouth once daily. 30 tablet 11    valACYclovir (VALTREX) 500 MG tablet Take 1 tablet (500 mg total) by mouth once daily. 90 tablet 1    valsartan (DIOVAN) 160 MG tablet Take 1 tablet (160 mg total) by mouth 2 (two) times daily. 180 tablet 1     No facility-administered encounter medications on file as of 10/14/2022.         PHYSICAL EXAMINATION:    The patient generally appears in good health, is appropriately interactive, and is in no apparent distress.    Skin: No lesions.    Mental: Cooperative with normal affect.    Neuro: Grossly intact.    HEENT: Normal. No evidence of lymphadenopathy.    Chest:  normal inspiratory effort.    Abdomen:  Soft, non-tender. No masses or organomegaly. Bladder is not palpable. No evidence of flank discomfort. " No evidence of inguinal hernia.    Extremities: No clubbing, cyanosis, or edema      LABS:    Lab Results   Component Value Date    BUN 16 09/14/2022    CREATININE 1.1 09/14/2022     UA 1.015, pH 5, tr protein, otherwise, negative    IMPRESSION:    No diagnosis found.    PLAN:    1. ***    Copy to: ***

## 2022-10-14 NOTE — PROGRESS NOTES
CHIEF COMPLAINT:    Mrs. Jose is a 72 y.o. female presenting for an annual visit    PRESENTING ILLNESS:    Vivienne Jose is a 72 y.o. female who states since was last seen, she has been diagnosed with non alcoholic cirrhosis.  She does not require treatment at this time.  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     REVIEW OF SYSTEMS:    Review of Systems   Constitutional: Negative.    HENT: Negative.     Eyes: Negative.    Respiratory: Negative.     Cardiovascular: Negative.    Gastrointestinal:  Positive for heartburn.   Musculoskeletal:  Positive for joint pain.   Skin: Negative.    Neurological: Negative.    Endo/Heme/Allergies: Negative.    Psychiatric/Behavioral: Negative.       PATIENT HISTORY:    Past Medical History:   Diagnosis Date    Allergy     Angio-edema     Arthritis     Cataract     bilateral - not removed    Cerebrovascular malformation     Cirrhosis 11/24/2020    Colon polyps     Coronary artery disease     Diabetes mellitus, type 2     Diabetic peripheral neuropathy     Edema of both feet 8/19/2022    GERD (gastroesophageal reflux disease)     Herpes infection     Hyperlipidemia     Hypertension     Hypothyroidism     Kidney stones     Mild nonproliferative diabetic retinopathy of 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 edema associated with type 2 diabetes mellitus 4/18/2019    DEL RIO (nonalcoholic steatohepatitis) 8/19/2022    Nausea & vomiting 11/23/2015    Obesity, morbid     Ovarian cyst     Seizure disorder, focal motor     Sleep apnea     Type II or unspecified type diabetes mellitus with neurological manifestations, uncontrolled(250.62)     Urticaria        Past Surgical History:   Procedure Laterality Date    CARPAL TUNNEL RELEASE Right 2014    COLONOSCOPY      COLONOSCOPY N/A 9/13/2017     Procedure: COLONOSCOPY Golytely;  Surgeon: Gisela Wall MD;  Location: [facility] ENDO;  Service: Endoscopy;  Laterality: N/A;    COLONOSCOPY N/A 9/9/2022    Procedure: COLONOSCOPY Extended Suprep;  Surgeon: Britt Jasso MD;  Location: [facility] ENDO;  Service: Endoscopy;  Laterality: N/A;    CYST REMOVAL      skin; multiples    ESOPHAGOGASTRODUODENOSCOPY Left 10/15/2021    Procedure: EGD (ESOPHAGOGASTRODUODENOSCOPY);  Surgeon: Luis Fernando Taveras MD;  Location: [facility] ([facility]);  Service: Endoscopy;  Laterality: Left;  cirrhosis labwork am of procedure  last seizure 1973  COVID test on 10/12/21 at [facility]    EXTRACORPOREAL SHOCK WAVE LITHOTRIPSY  2002    HYSTERECTOMY  1984    JOINT REPLACEMENT Right 03/06/2019    knee    KIDNEY STONE SURGERY      KNEE ARTHROPLASTY Right 3/6/2019    Procedure: ARTHROPLASTY, KNEE;  Surgeon: Pj Gamboa MD;  Location: [facility] OR;  Service: Orthopedics;  Laterality: Right;  Depuy (Stephen notified 2/21, CC)    THYROIDECTOMY  1977         TONSILLECTOMY, ADENOIDECTOMY      TRIGGER FINGER RELEASE      TUBAL LIGATION         Family History   Problem Relation Age of Onset    Cancer Father     Arthritis Father     Gout Father     Allergies Son     Allergic rhinitis Son     Skin cancer Mother     Macular degeneration Mother     Dementia Mother     Hypertension Mother     No Known Problems Daughter     Diabetes Daughter     Hypertension Daughter     No Known Problems Daughter     Glaucoma Maternal Aunt         Great Maternal Aunt    Leukemia Maternal Uncle      Social History     Socioeconomic History    Marital status:    Occupational History    Occupation: retired     Employer: [employer]   Tobacco Use    Smoking status: Never    Smokeless tobacco: Never   Substance and Sexual Activity    Alcohol use: No     Alcohol/week: 0.0 standard drinks    Drug use: No    Sexual activity: Yes     Partners: Male     Social Determinants of Health     Financial Resource Strain:  Low Risk     Difficulty of Paying Living Expenses: Not hard at all   Food Insecurity: No Food Insecurity    Worried About Running Out of Food in the Last Year: Never true    Ran Out of Food in the Last Year: Never true   Transportation Needs: No Transportation Needs    Lack of Transportation (Medical): No    Lack of Transportation (Non-Medical): No   Physical Activity: Inactive    Days of Exercise per Week: 0 days    Minutes of Exercise per Session: 0 min   Stress: Stress Concern Present    Feeling of Stress : To some extent   Social Connections: Unknown    Frequency of Communication with Friends and Family: Once a week    Frequency of Social Gatherings with Friends and Family: Patient refused    Active Member of Clubs or Organizations: No    Attends Club or Organization Meetings: Never    Marital Status:    Housing Stability: Low Risk     Unable to Pay for Housing in the Last Year: No    Number of Places Lived in the Last Year: 1    Unstable Housing in the Last Year: No       Allergies:  Sulfa (sulfonamide antibiotics), Ciprofloxacin, Januvia [sitagliptin], Lisinopril, Lotensin [benazepril], and Nexium [esomeprazole magnesium]    Medications:  Outpatient Encounter Medications as of 10/14/2022   Medication Sig Dispense Refill    amLODIPine (NORVASC) 5 MG tablet Take 1 tablet (5 mg total) by mouth every evening. 90 tablet 1    ascorbic acid, vitamin C, (VITAMIN C) 100 MG tablet Take 500 mg by mouth 2 (two) times daily.       aspirin (ECOTRIN) 81 MG EC tablet Take 81 mg by mouth once daily.      azelastine (ASTELIN) 137 mcg (0.1 %) nasal spray 2 sprays (274 mcg total) by Nasal route 2 (two) times daily. 120 mL 4    blood sugar diagnostic (TRUE METRIX GLUCOSE TEST STRIP) Strp TEST TWO TIMES DAILY 200 strip 6    blood-glucose meter Misc [employer] True Metrix Air meter 1 each 0    calcium carbonate-vitamin D3 600 mg(1,500mg) -100 unit Cap Take 1 tablet by mouth once daily.      fluticasone propionate (FLONASE) 50  "mcg/actuation nasal spray 2 sprays (100 mcg total) by Each Nostril route once daily. 48 g 4    furosemide (LASIX) 20 MG tablet Take 1 tablet (20 mg total) by mouth once daily. 30 tablet 11    gabapentin (NEURONTIN) 600 MG tablet TAKE 1 TABLET TWICE DAILY 180 tablet 0    glipiZIDE (GLUCOTROL) 5 MG tablet TAKE 1 TABLET TWICE DAILY BEFORE A MEAL 180 tablet 3    insulin degludec (TRESIBA FLEXTOUCH U-100) 100 unit/mL (3 mL) insulin pen INJECT 38 UNITS INTO THE SKIN EVERY EVENING. 10 pen 2    ketotifen (ZADITOR) 0.025 % (0.035 %) ophthalmic solution Place 1-2 drops into both eyes once daily.      L.acid-B.bifidum-B.animal-FOS 25 billion cell -100 mg Cap Take 1 capsule by mouth once daily.      lancets (TRUEPLUS LANCETS) 28 gauge Misc Inject 1 lancet into the skin 2 (two) times daily before meals. 200 each 6    levothyroxine (SYNTHROID) 75 MCG tablet TAKE 1 TABLET EVERY DAY 90 tablet 0    loratadine (CLARITIN) 10 mg tablet Take 10 mg by mouth once daily.      lovastatin (MEVACOR) 40 MG tablet TAKE 1 TABLET NIGHTLY (SUBSTITUTED FOR MEVACOR) 90 tablet 2    magnesium oxide-Mg AA chelate (MG-PLUS-PROTEIN) 133 mg Tab Take by mouth as directed.      metFORMIN (GLUCOPHAGE) 1000 MG tablet TAKE 1 TABLET TWICE DAILY WITH MEALS 180 tablet 0    mv-mn/folic acid/vit K/lkgq100 (ALIVE ONCE DAILY WOMEN 50 PLUS ORAL) Take 1 tablet by mouth once daily.      omeprazole (PRILOSEC) 20 MG capsule TAKE 1 CAPSULE EVERY DAY AS NEEDED 90 capsule 0    pen needle, diabetic (BD ULTRA-FINE EDUARDO PEN NEEDLE) 32 gauge x 5/32" Ndle USE AS DIRECTED 200 each 6    semaglutide (OZEMPIC) 0.25 mg or 0.5 mg(2 mg/1.5 mL) pen injector Inject 0.5 mg into the skin every 7 days. 3 pen 4    spironolactone (ALDACTONE) 50 MG tablet Take 1 tablet (50 mg total) by mouth once daily. 30 tablet 11    valACYclovir (VALTREX) 500 MG tablet Take 1 tablet (500 mg total) by mouth once daily. 90 tablet 1    valsartan (DIOVAN) 160 MG tablet Take 1 tablet (160 mg total) by mouth 2 " (two) times daily. 180 tablet 1    [DISCONTINUED] estradioL (ESTRING) 2 mg (7.5 mcg /24 hour) vaginal ring INSERT 1 RING VAGINALLY AS DIRECTED, CHANGE EVERY 90 DAYS 1 each 0    estradioL (ESTRING) 2 mg (7.5 mcg /24 hour) vaginal ring Remove old Estring, place the new one every 3 months. 1 each 3     No facility-administered encounter medications on file as of 10/14/2022.         PHYSICAL EXAMINATION:    The patient generally appears in good health, is appropriately interactive, and is in no apparent distress.    Skin: No lesions.    Mental: Cooperative with normal affect.    Neuro: Grossly intact.    HEENT: Normal. No evidence of lymphadenopathy.    Chest:  normal inspiratory effort.    Abdomen:  Soft, non-tender. No masses or organomegaly. Bladder is not palpable. No evidence of flank discomfort. No evidence of inguinal hernia.    Extremities: No clubbing, cyanosis, or edema    Normal external female genitalia  Urethral meatus is normal  Urethra and bladder are nontender to bimanual exam  Well supported anteriorly and posteriorly   Uterus and cervix are normal  No adnexal masses  The old ring was removed and the new one placed.  Lot #[id number], Exp 5/31/2023    LABS:    Lab Results   Component Value Date    BUN 16 09/14/2022    CREATININE 1.1 09/14/2022     UA 1.015, pH 5, tr protein, otheriwse, negative    IMPRESSION:    Encounter Diagnoses   Name Primary?    Recurrent UTI     Vaginal atrophy        PLAN:    1.  Refilled the Estring and will see her back in 1 year.      I spent 40 minutes with the patient of which more than half was spent in direct consultation with the patient in regards to our treatment and plan.

## 2022-10-17 ENCOUNTER — CLINICAL SUPPORT (OUTPATIENT)
Dept: INTERNAL MEDICINE | Facility: CLINIC | Age: 72
End: 2022-10-17
Payer: MEDICARE

## 2022-10-17 DIAGNOSIS — J30.9 CHRONIC ALLERGIC RHINITIS: Primary | ICD-10-CM

## 2022-10-17 PROCEDURE — 95115 IMMUNOTHERAPY ONE INJECTION: CPT | Mod: S$GLB,,, | Performed by: FAMILY MEDICINE

## 2022-10-17 PROCEDURE — 99499 UNLISTED E&M SERVICE: CPT | Mod: S$GLB,,, | Performed by: ALLERGY & IMMUNOLOGY

## 2022-10-17 PROCEDURE — 95115 PR IMMUNOTHERAPY, ONE INJECTION: ICD-10-PCS | Mod: S$GLB,,, | Performed by: FAMILY MEDICINE

## 2022-10-17 PROCEDURE — 99499 NO LOS: ICD-10-PCS | Mod: S$GLB,,, | Performed by: ALLERGY & IMMUNOLOGY

## 2022-10-27 ENCOUNTER — TELEPHONE (OUTPATIENT)
Dept: INTERNAL MEDICINE | Facility: CLINIC | Age: 72
End: 2022-10-27
Payer: MEDICARE

## 2022-10-27 NOTE — TELEPHONE ENCOUNTER
----- Message from Aislinn Magallon MD sent at 10/17/2022  1:03 PM CDT -----  Mammogram is normal

## 2022-11-04 ENCOUNTER — CLINICAL SUPPORT (OUTPATIENT)
Dept: INTERNAL MEDICINE | Facility: CLINIC | Age: 72
End: 2022-11-04
Payer: MEDICARE

## 2022-11-04 DIAGNOSIS — J30.9 CHRONIC ALLERGIC RHINITIS: Primary | ICD-10-CM

## 2022-11-04 PROCEDURE — 99499 UNLISTED E&M SERVICE: CPT | Mod: S$GLB,,, | Performed by: ALLERGY & IMMUNOLOGY

## 2022-11-04 PROCEDURE — 95115 IMMUNOTHERAPY ONE INJECTION: CPT | Mod: S$GLB,,, | Performed by: FAMILY MEDICINE

## 2022-11-04 PROCEDURE — 95115 PR IMMUNOTHERAPY, ONE INJECTION: ICD-10-PCS | Mod: S$GLB,,, | Performed by: FAMILY MEDICINE

## 2022-11-04 PROCEDURE — 99499 NO LOS: ICD-10-PCS | Mod: S$GLB,,, | Performed by: ALLERGY & IMMUNOLOGY

## 2022-11-18 ENCOUNTER — OFFICE VISIT (OUTPATIENT)
Dept: HEPATOLOGY | Facility: CLINIC | Age: 72
End: 2022-11-18
Payer: MEDICARE

## 2022-11-18 DIAGNOSIS — K74.69 OTHER CIRRHOSIS OF LIVER: ICD-10-CM

## 2022-11-18 DIAGNOSIS — I85.10 SECONDARY ESOPHAGEAL VARICES WITHOUT BLEEDING: ICD-10-CM

## 2022-11-18 DIAGNOSIS — K75.81 NASH (NONALCOHOLIC STEATOHEPATITIS): Primary | ICD-10-CM

## 2022-11-18 PROCEDURE — 3066F NEPHROPATHY DOC TX: CPT | Mod: CPTII,95,, | Performed by: INTERNAL MEDICINE

## 2022-11-18 PROCEDURE — 3066F PR DOCUMENTATION OF TREATMENT FOR NEPHROPATHY: ICD-10-PCS | Mod: CPTII,95,, | Performed by: INTERNAL MEDICINE

## 2022-11-18 PROCEDURE — 3061F NEG MICROALBUMINURIA REV: CPT | Mod: CPTII,95,, | Performed by: INTERNAL MEDICINE

## 2022-11-18 PROCEDURE — 99213 PR OFFICE/OUTPT VISIT, EST, LEVL III, 20-29 MIN: ICD-10-PCS | Mod: 95,,, | Performed by: INTERNAL MEDICINE

## 2022-11-18 PROCEDURE — 1160F PR REVIEW ALL MEDS BY PRESCRIBER/CLIN PHARMACIST DOCUMENTED: ICD-10-PCS | Mod: CPTII,95,, | Performed by: INTERNAL MEDICINE

## 2022-11-18 PROCEDURE — 4010F ACE/ARB THERAPY RXD/TAKEN: CPT | Mod: CPTII,95,, | Performed by: INTERNAL MEDICINE

## 2022-11-18 PROCEDURE — 3072F LOW RISK FOR RETINOPATHY: CPT | Mod: CPTII,95,, | Performed by: INTERNAL MEDICINE

## 2022-11-18 PROCEDURE — 3044F HG A1C LEVEL LT 7.0%: CPT | Mod: CPTII,95,, | Performed by: INTERNAL MEDICINE

## 2022-11-18 PROCEDURE — 1160F RVW MEDS BY RX/DR IN RCRD: CPT | Mod: CPTII,95,, | Performed by: INTERNAL MEDICINE

## 2022-11-18 PROCEDURE — 1157F ADVNC CARE PLAN IN RCRD: CPT | Mod: CPTII,95,, | Performed by: INTERNAL MEDICINE

## 2022-11-18 PROCEDURE — 3044F PR MOST RECENT HEMOGLOBIN A1C LEVEL <7.0%: ICD-10-PCS | Mod: CPTII,95,, | Performed by: INTERNAL MEDICINE

## 2022-11-18 PROCEDURE — 1159F PR MEDICATION LIST DOCUMENTED IN MEDICAL RECORD: ICD-10-PCS | Mod: CPTII,95,, | Performed by: INTERNAL MEDICINE

## 2022-11-18 PROCEDURE — 1159F MED LIST DOCD IN RCRD: CPT | Mod: CPTII,95,, | Performed by: INTERNAL MEDICINE

## 2022-11-18 PROCEDURE — 99213 OFFICE O/P EST LOW 20 MIN: CPT | Mod: 95,,, | Performed by: INTERNAL MEDICINE

## 2022-11-18 PROCEDURE — 3061F PR NEG MICROALBUMINURIA RESULT DOCUMENTED/REVIEW: ICD-10-PCS | Mod: CPTII,95,, | Performed by: INTERNAL MEDICINE

## 2022-11-18 PROCEDURE — 1157F PR ADVANCE CARE PLAN OR EQUIV PRESENT IN MEDICAL RECORD: ICD-10-PCS | Mod: CPTII,95,, | Performed by: INTERNAL MEDICINE

## 2022-11-18 PROCEDURE — 4010F PR ACE/ARB THEARPY RXD/TAKEN: ICD-10-PCS | Mod: CPTII,95,, | Performed by: INTERNAL MEDICINE

## 2022-11-18 PROCEDURE — 3072F PR LOW RISK FOR RETINOPATHY: ICD-10-PCS | Mod: CPTII,95,, | Performed by: INTERNAL MEDICINE

## 2022-11-18 NOTE — Clinical Note
1. Edema better on diuretics- continue 2. Labs once month x 3 and then if stable every 8 weeks 3. Abdo US as scheduled 4. Heart palpitations-see cardiologist- Dr Odonnell 5. Return 3 months Identified pt with two pt identifiers(name and ). Chief Complaint Patient presents with  
 Hand Swelling Complains of bilateral hand swelling and joint pain. Moves from one joint to another  Shoulder Pain Complains of aggravating right shoulder pain that increases when lying down. Health Maintenance Due Topic  Shingrix Vaccine Age 50> (1 of 2)  DTaP/Tdap/Td series (1 - Tdap)  Influenza Age 5 to Adult  Pneumococcal 65+ Low/Medium Risk (2 of 2 - PPSV23)  HEMOGLOBIN A1C Q6M   
 MICROALBUMIN Q1 Wt Readings from Last 3 Encounters:  
18 151 lb (68.5 kg) 18 148 lb 9.6 oz (67.4 kg) 18 148 lb 12.8 oz (67.5 kg) Temp Readings from Last 3 Encounters:  
18 98.7 °F (37.1 °C) (Oral) 18 98 °F (36.7 °C) (Oral) 18 98.2 °F (36.8 °C) (Oral) BP Readings from Last 3 Encounters:  
18 139/70  
18 120/70  
18 120/70 Pulse Readings from Last 3 Encounters:  
18 71  
18 67  
18 69 Learning Assessment: 
:  
 
Learning Assessment 2018 PRIMARY LEARNER Patient Patient HIGHEST LEVEL OF EDUCATION - PRIMARY LEARNER  - 2 YEARS OF COLLEGE  
BARRIERS PRIMARY LEARNER - NONE  
CO-LEARNER CAREGIVER No No  
PRIMARY LANGUAGE ENGLISH ENGLISH  
LEARNER PREFERENCE PRIMARY DEMONSTRATION OTHER (COMMENT) ANSWERED BY patient self RELATIONSHIP SELF SELF Depression Screening: 
:  
 
PHQ over the last two weeks 2019 Little interest or pleasure in doing things Not at all Feeling down, depressed, irritable, or hopeless Not at all Total Score PHQ 2 0 Fall Risk Assessment: 
:  
 
Fall Risk Assessment, last 12 mths 2019 Able to walk? Yes Fall in past 12 months? No  
 
 
Abuse Screening: 
:  
 
Abuse Screening Questionnaire 2018 Do you ever feel afraid of your partner? Chuck Hensley Are you in a relationship with someone who physically or mentally threatens you?  Chuck Hensley  
 Is it safe for you to go home? Ana Cristina Colbert Coordination of Care Questionnaire: 
:  
 
1) Have you been to an emergency room, urgent care clinic since your last visit? no  
Hospitalized since your last visit? no          
 
2) Have you seen or consulted any other health care providers outside of [address] [place] [address] since your last visit? no  (Include any pap smears or colon screenings in this section.) 3) Do you have an Advance Directive on file? no 
Are you interested in receiving information about Advance Directives? no 
 
Reviewed record in preparation for visit and have obtained necessary documentation. Medication reconciliation up to date and corrected with patient at this time.

## 2022-11-18 NOTE — PROGRESS NOTES
HEPATOLOGY FOLLOW UP    Referring Physician: Aislinn Magallon MD  Current Corresponding Physician: Aislinn Magallon MD, Hilda Odonnell MD    Vivienne Jose is here for follow up of compensated cirrhosis.    HPI  She is a 72 y.o. female with a PMHx of DM, HTN, CAD, hyperlipidemia, recurrent UTIs and obesity who had a renal stone CT and abdominal US (both 11/20) and was found to have a nodular liver c/w cirrhosis but no HCC. The patient does not drink alcohol heavily; there is no family history of chronic liver disease. HCV AB was negative in 2017. I saw her in consultation 11/25/20.    Interval HIstory  Since Vivienne Jose's last few visits:    Was c/o edema, worse at the end of the day. No worsening mental status. At her last visit I placed her on lasix 20 mg and aldactone 50 mg daily. Her edema is well controlled now. Now c/o heart palpitations. Did wear a holter ~5-6 years ago- by report no issues. No associated chest pain or SOB    Serologic w/u for CLD:  HCV Ab-, HBsAg-, HBcAb-, HBsAb-, HAV IgG+  CHRISTINA+ (1:1280), ASMA-,   Ferritin 38, iron sat 15%  Peth negative  Alpha 1 antitrypsin level 123    Labs 8/4/22: ALT 26, AST 19, ALKP 75, Tbil 0.5, albumin 3.3    Abdomen US 6/17/22: fatty liver and no cancer    EGD: 10/21: no EV; repeat in 2024    MELD-Na score: 6 at 10/14/2022  8:59 AM  MELD score: 6 at 10/14/2022  8:59 AM  Calculated from:  Serum Creatinine: 0.9 mg/dL (Using min of 1 mg/dL) at 10/14/2022  8:59 AM  Serum Sodium: 140 mmol/L (Using max of 137 mmol/L) at 10/14/2022  8:59 AM  Total Bilirubin: 0.5 mg/dL (Using min of 1 mg/dL) at 10/14/2022  8:59 AM  INR(ratio): 1.0 at 10/14/2022  8:59 AM  Age: 72 years    Outpatient Encounter Medications as of 11/18/2022   Medication Sig Dispense Refill    amLODIPine (NORVASC) 5 MG tablet Take 1 tablet (5 mg total) by mouth every evening. 90 tablet 1    ascorbic acid, vitamin C, (VITAMIN C) 100 MG tablet Take 500 mg by mouth 2 (two) times daily.       aspirin (ECOTRIN)  81 MG EC tablet Take 81 mg by mouth once daily.      azelastine (ASTELIN) 137 mcg (0.1 %) nasal spray 2 sprays (274 mcg total) by Nasal route 2 (two) times daily. 120 mL 4    blood sugar diagnostic (TRUE METRIX GLUCOSE TEST STRIP) Strp TEST TWO TIMES DAILY 200 strip 6    blood-glucose meter Misc [employer] True Metrix Air meter 1 each 0    calcium carbonate-vitamin D3 600 mg(1,500mg) -100 unit Cap Take 1 tablet by mouth once daily.      estradioL (ESTRING) 2 mg (7.5 mcg /24 hour) vaginal ring Remove old Estring, place the new one every 3 months. 1 each 3    fluticasone propionate (FLONASE) 50 mcg/actuation nasal spray 2 sprays (100 mcg total) by Each Nostril route once daily. 48 g 4    furosemide (LASIX) 20 MG tablet Take 1 tablet (20 mg total) by mouth once daily. 30 tablet 11    gabapentin (NEURONTIN) 600 MG tablet TAKE 1 TABLET TWICE DAILY 180 tablet 0    glipiZIDE (GLUCOTROL) 5 MG tablet TAKE 1 TABLET TWICE DAILY BEFORE A MEAL 180 tablet 3    insulin degludec (TRESIBA FLEXTOUCH U-100) 100 unit/mL (3 mL) insulin pen INJECT 38 UNITS INTO THE SKIN EVERY EVENING. 10 pen 2    ketotifen (ZADITOR) 0.025 % (0.035 %) ophthalmic solution Place 1-2 drops into both eyes once daily.      L.acid-B.bifidum-B.animal-FOS 25 billion cell -100 mg Cap Take 1 capsule by mouth once daily.      lancets (TRUEPLUS LANCETS) 28 gauge [facility] Inject 1 lancet into the skin 2 (two) times daily before meals. 200 each 6    levothyroxine (SYNTHROID) 75 MCG tablet TAKE 1 TABLET EVERY DAY 90 tablet 0    loratadine (CLARITIN) 10 mg tablet Take 10 mg by mouth once daily.      lovastatin (MEVACOR) 40 MG tablet TAKE 1 TABLET NIGHTLY (SUBSTITUTED FOR MEVACOR) 90 tablet 2    magnesium oxide-Mg AA chelate (MG-PLUS-PROTEIN) 133 mg Tab Take by mouth as directed.      metFORMIN (GLUCOPHAGE) 1000 MG tablet TAKE 1 TABLET TWICE DAILY WITH MEALS 180 tablet 0    mv-mn/folic acid/vit K/scae379 (ALIVE ONCE DAILY WOMEN 50 PLUS ORAL) Take 1 tablet by mouth once daily.    "   omeprazole (PRILOSEC) 20 MG capsule TAKE 1 CAPSULE EVERY DAY AS NEEDED 90 capsule 0    pen needle, diabetic (BD ULTRA-FINE EDUARDO PEN NEEDLE) 32 gauge x 5/32" Ndle USE AS DIRECTED 200 each 6    semaglutide (OZEMPIC) 0.25 mg or 0.5 mg(2 mg/1.5 mL) pen injector Inject 0.5 mg into the skin every 7 days. 3 pen 4    spironolactone (ALDACTONE) 50 MG tablet Take 1 tablet (50 mg total) by mouth once daily. 30 tablet 11    valACYclovir (VALTREX) 500 MG tablet TAKE 1 TABLET EVERY DAY 90 tablet 3    valsartan (DIOVAN) 160 MG tablet Take 1 tablet (160 mg total) by mouth 2 (two) times daily. 180 tablet 1     No facility-administered encounter medications on file as of 11/18/2022.     Review of patient's allergies indicates:   Allergen Reactions    Sulfa (sulfonamide antibiotics) Nausea Only    Ciprofloxacin     Januvia [sitagliptin]      abd pain    Lisinopril     Lotensin [benazepril]     Nexium [esomeprazole magnesium] Other (See Comments)     Gas     Past Medical History:   Diagnosis Date    Allergy     Angio-edema     Arthritis     Cataract     bilateral - not removed    Cerebrovascular malformation     Cirrhosis 11/24/2020    Colon polyps     Coronary artery disease     Diabetes mellitus, type 2     Diabetic peripheral neuropathy     Edema of both feet 8/19/2022    GERD (gastroesophageal reflux disease)     Herpes infection     Hyperlipidemia     Hypertension     Hypothyroidism     Kidney stones     Mild nonproliferative diabetic retinopathy of 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 edema associated with type 2 diabetes mellitus 4/18/2019    DEL RIO (nonalcoholic steatohepatitis) 8/19/2022    Nausea & vomiting 11/23/2015    Obesity, morbid     Ovarian cyst     Seizure disorder, focal motor     Sleep apnea     Type II or unspecified type diabetes mellitus with neurological manifestations, uncontrolled(250.62)     Urticaria        Review of Systems   Constitutional: Negative.    HENT: Negative.     Eyes: Negative.    Respiratory: Negative.  "    Cardiovascular: Negative.    Gastrointestinal: Negative.    Genitourinary: Negative.    Musculoskeletal: Negative.    Skin: Negative.    Neurological: Negative.    Psychiatric/Behavioral: Negative.     There were no vitals filed for this visit.      Physical Exam  Vitals reviewed.   Constitutional:       Appearance: She is well-developed.   HENT:      Head: Normocephalic and atraumatic.   Eyes:      General: No scleral icterus.     Conjunctiva/sclera: Conjunctivae normal.      Pupils: Pupils are equal, round, and reactive to light.   Neck:      Thyroid: No thyromegaly.   Cardiovascular:      Rate and Rhythm: Normal rate and regular rhythm.      Heart sounds: Normal heart sounds.   Pulmonary:      Effort: Pulmonary effort is normal.      Breath sounds: Normal breath sounds. No rales.   Abdominal:      General: Bowel sounds are normal. There is no distension.      Palpations: Abdomen is soft. There is no mass.      Tenderness: There is no abdominal tenderness.   Musculoskeletal:         General: Swelling present. Normal range of motion.      Cervical back: Normal range of motion and neck supple.   Skin:     General: Skin is warm and dry.      Findings: No rash.   Neurological:      Mental Status: She is alert and oriented to person, place, and time.       MELD-Na score: 6 at 10/14/2022  8:59 AM  MELD score: 6 at 10/14/2022  8:59 AM  Calculated from:  Serum Creatinine: 0.9 mg/dL (Using min of 1 mg/dL) at 10/14/2022  8:59 AM  Serum Sodium: 140 mmol/L (Using max of 137 mmol/L) at 10/14/2022  8:59 AM  Total Bilirubin: 0.5 mg/dL (Using min of 1 mg/dL) at 10/14/2022  8:59 AM  INR(ratio): 1.0 at 10/14/2022  8:59 AM  Age: 72 years    Lab Results   Component Value Date     (H) 10/14/2022    BUN 21 10/14/2022    CREATININE 0.9 10/14/2022    CALCIUM 9.7 10/14/2022     10/14/2022    K 4.7 10/14/2022     10/14/2022    PROT 7.8 10/14/2022    CO2 27 10/14/2022    ANIONGAP 10 10/14/2022    WBC 9.31 10/14/2022     RBC 4.25 10/14/2022    HGB 13.5 10/14/2022    HCT 42.3 10/14/2022     (H) 10/14/2022    MCH 31.8 (H) 10/14/2022    MCHC 31.9 (L) 10/14/2022     Lab Results   Component Value Date    RDW 14.5 10/14/2022     10/14/2022    MPV 10.2 10/14/2022    GRAN 5.7 10/14/2022    GRAN 61.5 10/14/2022    LYMPH 2.3 10/14/2022    LYMPH 24.4 10/14/2022    MONO 1.0 10/14/2022    MONO 10.2 10/14/2022    EOSINOPHIL 3.0 10/14/2022    BASOPHIL 0.5 10/14/2022    EOS 0.3 10/14/2022    BASO 0.05 10/14/2022    APTT 25.6 07/27/2011    GROUPTRH O POS 11/29/2007    CHOL 143 02/07/2022    TRIG 119 02/07/2022    HDL 52 02/07/2022    CHOLHDL 36.4 02/07/2022    TOTALCHOLEST 2.8 02/07/2022    ALBUMIN 3.5 10/14/2022    BILIDIR 0.3 06/15/2013    AST 26 10/14/2022    ALT 22 10/14/2022    ALKPHOS 94 10/14/2022    MG 2.0 07/27/2011    LABPROT 10.3 10/14/2022    INR 1.0 10/14/2022       Assessment and Plan:  Vivienne Jose is a 72 y.o. female with compensated cirrhosis. Current recommendations:  1. Cirrhosis: now with a decline in albumin and persistent edema: continue lasix 20 mg and spironolactone 50 mg daily; : labs every 4 weeks x 3 then reassess in 3 months: HCC screening in 6 months (12/22); EGD to screen for varices-repeat in 2024;  2. Recurrent UTIs: monitor  3. Edema: diuretics as above  4. Palpitations: f/u with cardiology    Return 3 months

## 2022-11-18 NOTE — PATIENT INSTRUCTIONS
Edema better on diuretics- continue  Labs once month x 3 and then if stable every 8 weeks  Abdo US as scheduled  Heart palpitations-see cardiologist- Dr Odonnell  Return 3 months

## 2022-11-21 ENCOUNTER — PATIENT MESSAGE (OUTPATIENT)
Dept: HEPATOLOGY | Facility: CLINIC | Age: 72
End: 2022-11-21
Payer: MEDICARE

## 2022-11-21 ENCOUNTER — TELEPHONE (OUTPATIENT)
Dept: HEPATOLOGY | Facility: CLINIC | Age: 72
End: 2022-11-21
Payer: MEDICARE

## 2022-11-28 ENCOUNTER — CLINICAL SUPPORT (OUTPATIENT)
Dept: INTERNAL MEDICINE | Facility: CLINIC | Age: 72
End: 2022-11-28
Payer: MEDICARE

## 2022-11-28 DIAGNOSIS — J30.9 CHRONIC ALLERGIC RHINITIS: Primary | ICD-10-CM

## 2022-11-28 PROCEDURE — 95115 PR IMMUNOTHERAPY, ONE INJECTION: ICD-10-PCS | Mod: S$GLB,,, | Performed by: ALLERGY & IMMUNOLOGY

## 2022-11-28 PROCEDURE — 95115 IMMUNOTHERAPY ONE INJECTION: CPT | Mod: S$GLB,,, | Performed by: ALLERGY & IMMUNOLOGY

## 2022-11-28 PROCEDURE — 95115 IMMUNOTHERAPY ONE INJECTION: CPT | Mod: S$GLB,,, | Performed by: FAMILY MEDICINE

## 2022-11-28 PROCEDURE — 95115 PR IMMUNOTHERAPY, ONE INJECTION: ICD-10-PCS | Mod: S$GLB,,, | Performed by: FAMILY MEDICINE

## 2022-11-28 PROCEDURE — 99999 PR PBB SHADOW E&M-EST. PATIENT-LVL I: CPT | Mod: PBBFAC,,,

## 2022-11-28 PROCEDURE — 99999 PR PBB SHADOW E&M-EST. PATIENT-LVL I: ICD-10-PCS | Mod: PBBFAC,,,

## 2022-11-28 NOTE — PROGRESS NOTES
Pt. Here today for maintenance split dose, will get 0.25 ml in left arm wait 30 minutes , then will repeat 0.25 ml in left middle  arm will be given 0.25 ml and wait additional 30 minutes.

## 2022-12-01 ENCOUNTER — OFFICE VISIT (OUTPATIENT)
Dept: CARDIOLOGY | Facility: CLINIC | Age: 72
End: 2022-12-01
Payer: MEDICARE

## 2022-12-01 ENCOUNTER — HOSPITAL ENCOUNTER (OUTPATIENT)
Dept: RADIOLOGY | Facility: HOSPITAL | Age: 72
Discharge: HOME | End: 2022-12-01
Attending: INTERNAL MEDICINE
Payer: MEDICARE

## 2022-12-01 VITALS
OXYGEN SATURATION: 100 % | DIASTOLIC BLOOD PRESSURE: 56 MMHG | HEART RATE: 54 BPM | WEIGHT: 242 LBS | BODY MASS INDEX: 44.26 KG/M2 | SYSTOLIC BLOOD PRESSURE: 118 MMHG

## 2022-12-01 DIAGNOSIS — I15.2 HYPERTENSION ASSOCIATED WITH DIABETES: ICD-10-CM

## 2022-12-01 DIAGNOSIS — E11.59 HYPERTENSION ASSOCIATED WITH DIABETES: ICD-10-CM

## 2022-12-01 DIAGNOSIS — I25.10 CORONARY ARTERY DISEASE INVOLVING NATIVE CORONARY ARTERY OF NATIVE HEART WITHOUT ANGINA PECTORIS: ICD-10-CM

## 2022-12-01 DIAGNOSIS — E78.5 HYPERLIPIDEMIA ASSOCIATED WITH TYPE 2 DIABETES MELLITUS: ICD-10-CM

## 2022-12-01 DIAGNOSIS — K74.69 OTHER CIRRHOSIS OF LIVER: ICD-10-CM

## 2022-12-01 DIAGNOSIS — E66.01 MORBID OBESITY: ICD-10-CM

## 2022-12-01 DIAGNOSIS — R00.2 PALPITATIONS: Primary | ICD-10-CM

## 2022-12-01 DIAGNOSIS — G47.33 OSA (OBSTRUCTIVE SLEEP APNEA): ICD-10-CM

## 2022-12-01 DIAGNOSIS — E11.9 DIABETES MELLITUS WITHOUT COMPLICATION: ICD-10-CM

## 2022-12-01 DIAGNOSIS — E11.69 HYPERLIPIDEMIA ASSOCIATED WITH TYPE 2 DIABETES MELLITUS: ICD-10-CM

## 2022-12-01 PROCEDURE — 99999 PR PBB SHADOW E&M-EST. PATIENT-LVL II: ICD-10-PCS | Mod: PBBFAC,,, | Performed by: INTERNAL MEDICINE

## 2022-12-01 PROCEDURE — 3066F PR DOCUMENTATION OF TREATMENT FOR NEPHROPATHY: ICD-10-PCS | Mod: CPTII,S$GLB,, | Performed by: INTERNAL MEDICINE

## 2022-12-01 PROCEDURE — 3078F PR MOST RECENT DIASTOLIC BLOOD PRESSURE < 80 MM HG: ICD-10-PCS | Mod: CPTII,S$GLB,, | Performed by: INTERNAL MEDICINE

## 2022-12-01 PROCEDURE — 1159F PR MEDICATION LIST DOCUMENTED IN MEDICAL RECORD: ICD-10-PCS | Mod: CPTII,S$GLB,, | Performed by: INTERNAL MEDICINE

## 2022-12-01 PROCEDURE — 99214 PR OFFICE/OUTPT VISIT, EST, LEVL IV, 30-39 MIN: ICD-10-PCS | Mod: S$GLB,,, | Performed by: INTERNAL MEDICINE

## 2022-12-01 PROCEDURE — 3074F SYST BP LT 130 MM HG: CPT | Mod: CPTII,S$GLB,, | Performed by: INTERNAL MEDICINE

## 2022-12-01 PROCEDURE — 3074F PR MOST RECENT SYSTOLIC BLOOD PRESSURE < 130 MM HG: ICD-10-PCS | Mod: CPTII,S$GLB,, | Performed by: INTERNAL MEDICINE

## 2022-12-01 PROCEDURE — 3078F DIAST BP <80 MM HG: CPT | Mod: CPTII,S$GLB,, | Performed by: INTERNAL MEDICINE

## 2022-12-01 PROCEDURE — 3061F NEG MICROALBUMINURIA REV: CPT | Mod: CPTII,S$GLB,, | Performed by: INTERNAL MEDICINE

## 2022-12-01 PROCEDURE — 76700 US ABDOMEN COMPLETE: ICD-10-PCS | Mod: 26,,, | Performed by: RADIOLOGY

## 2022-12-01 PROCEDURE — 3072F PR LOW RISK FOR RETINOPATHY: ICD-10-PCS | Mod: CPTII,S$GLB,, | Performed by: INTERNAL MEDICINE

## 2022-12-01 PROCEDURE — 4010F PR ACE/ARB THEARPY RXD/TAKEN: ICD-10-PCS | Mod: CPTII,S$GLB,, | Performed by: INTERNAL MEDICINE

## 2022-12-01 PROCEDURE — 99214 OFFICE O/P EST MOD 30 MIN: CPT | Mod: S$GLB,,, | Performed by: INTERNAL MEDICINE

## 2022-12-01 PROCEDURE — 1160F PR REVIEW ALL MEDS BY PRESCRIBER/CLIN PHARMACIST DOCUMENTED: ICD-10-PCS | Mod: CPTII,S$GLB,, | Performed by: INTERNAL MEDICINE

## 2022-12-01 PROCEDURE — 76700 US EXAM ABDOM COMPLETE: CPT | Mod: TC

## 2022-12-01 PROCEDURE — 99999 PR PBB SHADOW E&M-EST. PATIENT-LVL II: CPT | Mod: PBBFAC,,, | Performed by: INTERNAL MEDICINE

## 2022-12-01 PROCEDURE — 1126F PR PAIN SEVERITY QUANTIFIED, NO PAIN PRESENT: ICD-10-PCS | Mod: CPTII,S$GLB,, | Performed by: INTERNAL MEDICINE

## 2022-12-01 PROCEDURE — 76700 US EXAM ABDOM COMPLETE: CPT | Mod: 26,,, | Performed by: RADIOLOGY

## 2022-12-01 PROCEDURE — 4010F ACE/ARB THERAPY RXD/TAKEN: CPT | Mod: CPTII,S$GLB,, | Performed by: INTERNAL MEDICINE

## 2022-12-01 PROCEDURE — 1157F PR ADVANCE CARE PLAN OR EQUIV PRESENT IN MEDICAL RECORD: ICD-10-PCS | Mod: CPTII,S$GLB,, | Performed by: INTERNAL MEDICINE

## 2022-12-01 PROCEDURE — 3044F HG A1C LEVEL LT 7.0%: CPT | Mod: CPTII,S$GLB,, | Performed by: INTERNAL MEDICINE

## 2022-12-01 PROCEDURE — 1157F ADVNC CARE PLAN IN RCRD: CPT | Mod: CPTII,S$GLB,, | Performed by: INTERNAL MEDICINE

## 2022-12-01 PROCEDURE — 1159F MED LIST DOCD IN RCRD: CPT | Mod: CPTII,S$GLB,, | Performed by: INTERNAL MEDICINE

## 2022-12-01 PROCEDURE — 3044F PR MOST RECENT HEMOGLOBIN A1C LEVEL <7.0%: ICD-10-PCS | Mod: CPTII,S$GLB,, | Performed by: INTERNAL MEDICINE

## 2022-12-01 PROCEDURE — 1160F RVW MEDS BY RX/DR IN RCRD: CPT | Mod: CPTII,S$GLB,, | Performed by: INTERNAL MEDICINE

## 2022-12-01 PROCEDURE — 3008F BODY MASS INDEX DOCD: CPT | Mod: CPTII,S$GLB,, | Performed by: INTERNAL MEDICINE

## 2022-12-01 PROCEDURE — 3061F PR NEG MICROALBUMINURIA RESULT DOCUMENTED/REVIEW: ICD-10-PCS | Mod: CPTII,S$GLB,, | Performed by: INTERNAL MEDICINE

## 2022-12-01 PROCEDURE — 1126F AMNT PAIN NOTED NONE PRSNT: CPT | Mod: CPTII,S$GLB,, | Performed by: INTERNAL MEDICINE

## 2022-12-01 PROCEDURE — 3072F LOW RISK FOR RETINOPATHY: CPT | Mod: CPTII,S$GLB,, | Performed by: INTERNAL MEDICINE

## 2022-12-01 PROCEDURE — 3066F NEPHROPATHY DOC TX: CPT | Mod: CPTII,S$GLB,, | Performed by: INTERNAL MEDICINE

## 2022-12-01 PROCEDURE — 3008F PR BODY MASS INDEX (BMI) DOCUMENTED: ICD-10-PCS | Mod: CPTII,S$GLB,, | Performed by: INTERNAL MEDICINE

## 2022-12-01 NOTE — PROGRESS NOTES
Cardiology Clinic note    Subjective:   Patient ID:  Vivienne Jose is a 72 y.o. female who presents for palpitations    HPI:   Palpitations: Has noted these episodes since changing the dose of furosemide (takes for swelling). No syncope. Occur randomly. Not provoked with exertion.    CAD: Denies CP, BRIONES. Active with ADLs.    HTN: On medications    HLD: Tolerating statin    SH Tobacco Never used  FH Maternal grand parents  in early 60s of MI    Patient Active Problem List    Diagnosis Date Noted    DEL RIO (nonalcoholic steatohepatitis) 2022    Edema of both feet 2022    De Quervain's tenosynovitis, left 2022    History of recurrent UTI (urinary tract infection) 2022    Varices, esophageal 10/15/2021    Cirrhosis 2020    Thyroid nodule 2020    Hypothyroidism 2019    Mild nonproliferative diabetic retinopathy of 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 edema associated with type 2 diabetes mellitus 2019    Decreased range of motion of right knee 2019    Muscle weakness of lower extremity 2019    S/P total knee arthroplasty, right 2019    Interstitial cystitis 2018    Type 2 diabetes mellitus with stage 3a chronic kidney disease, with long-term current use of insulin 2018    Diverticulosis of intestine without bleeding 2017    Skin nodule 2017    Hypokalemia 2017    Diabetic polyneuropathy associated with type 2 diabetes mellitus 2017    Nephrolithiasis 2016    Gastroesophageal reflux disease 2016    Hyperlipidemia 2016    Chronic pain of right knee 2016    Recurrent HSV (herpes simplex virus) 2016    Hypocalcemia 2016    Morbid obesity with body mass index (BMI) of 40.0 to 44.9 in adult 2016    Osteopenia 2015    Vitamin D deficiency 2015    Sacroiliitis 2015    Facet arthritis of lumbar region 2015     Seen on lumbar MRI dated 06/01/15      Physical  deconditioning 06/09/2015    BAM (obstructive sleep apnea) 04/28/2015    Proteinuria 02/03/2015    Carpal tunnel syndrome of right wrist 07/08/2014    Nuclear sclerosis - Both Eyes 04/02/2013    Chronic allergic rhinitis 11/30/2012    CAD (coronary artery disease) 11/23/2012     Cardiology and stress test 12/11      Kidney stones 08/24/2012     Followed by nephrology      Essential hypertension 08/24/2012    Trigger middle finger of right hand 07/30/2012    Bilateral carotid artery disease 07/26/2011     Seen on carotid ultrasound dated 07/26/11      Facet arthritis of cervical region 07/25/2011     Seen on cervical xray dated 07/25/11         Patient's Medications   New Prescriptions    No medications on file   Previous Medications    AMLODIPINE (NORVASC) 5 MG TABLET    Take 1 tablet (5 mg total) by mouth every evening.    ASCORBIC ACID, VITAMIN C, (VITAMIN C) 100 MG TABLET    Take 500 mg by mouth 2 (two) times daily.     ASPIRIN (ECOTRIN) 81 MG EC TABLET    Take 81 mg by mouth once daily.    AZELASTINE (ASTELIN) 137 MCG (0.1 %) NASAL SPRAY    2 sprays (274 mcg total) by Nasal route 2 (two) times daily.    BLOOD SUGAR DIAGNOSTIC (TRUE METRIX GLUCOSE TEST STRIP) STRP    TEST TWO TIMES DAILY    BLOOD-GLUCOSE METER MISC    [employer] True Metrix Air meter    CALCIUM CARBONATE-VITAMIN D3 600 MG(1,500MG) -100 UNIT CAP    Take 1 tablet by mouth once daily.    ESTRADIOL (ESTRING) 2 MG (7.5 MCG /24 HOUR) VAGINAL RING    Remove old Estring, place the new one every 3 months.    FLUTICASONE PROPIONATE (FLONASE) 50 MCG/ACTUATION NASAL SPRAY    2 sprays (100 mcg total) by Each Nostril route once daily.    FUROSEMIDE (LASIX) 20 MG TABLET    Take 1 tablet (20 mg total) by mouth once daily.    GABAPENTIN (NEURONTIN) 600 MG TABLET    TAKE 1 TABLET TWICE DAILY    GLIPIZIDE (GLUCOTROL) 5 MG TABLET    TAKE 1 TABLET TWICE DAILY BEFORE A MEAL    INSULIN DEGLUDEC (TRESIBA FLEXTOUCH U-100) 100 UNIT/ML (3 ML) INSULIN PEN    INJECT 38 UNITS  "INTO THE SKIN EVERY EVENING.    KETOTIFEN (ZADITOR) 0.025 % (0.035 %) OPHTHALMIC SOLUTION    Place 1-2 drops into both eyes once daily.    L.ACID-B.BIFIDUM-B.ANIMAL-FOS 25 BILLION CELL -100 MG CAP    Take 1 capsule by mouth once daily.    LANCETS (TRUEPLUS LANCETS) 28 GAUGE MISC    Inject 1 lancet into the skin 2 (two) times daily before meals.    LEVOTHYROXINE (SYNTHROID) 75 MCG TABLET    TAKE 1 TABLET EVERY DAY    LORATADINE (CLARITIN) 10 MG TABLET    Take 10 mg by mouth once daily.    LOVASTATIN (MEVACOR) 40 MG TABLET    TAKE 1 TABLET NIGHTLY (SUBSTITUTED FOR MEVACOR)    MAGNESIUM OXIDE-MG AA CHELATE (MG-PLUS-PROTEIN) 133 MG TAB    Take by mouth as directed.    METFORMIN (GLUCOPHAGE) 1000 MG TABLET    TAKE 1 TABLET TWICE DAILY WITH MEALS    MV-MN/FOLIC ACID/VIT [id number] (ALIVE ONCE DAILY WOMEN 50 PLUS ORAL)    Take 1 tablet by mouth once daily.    OMEPRAZOLE (PRILOSEC) 20 MG CAPSULE    TAKE 1 CAPSULE EVERY DAY AS NEEDED    PEN NEEDLE, DIABETIC (BD ULTRA-FINE EDUARDO PEN NEEDLE) 32 GAUGE X 5/32" NDLE    USE AS DIRECTED    SEMAGLUTIDE (OZEMPIC) 0.25 MG OR 0.5 MG(2 MG/1.5 ML) PEN INJECTOR    Inject 0.5 mg into the skin every 7 days.    SPIRONOLACTONE (ALDACTONE) 50 MG TABLET    Take 1 tablet (50 mg total) by mouth once daily.    VALACYCLOVIR (VALTREX) 500 MG TABLET    TAKE 1 TABLET EVERY DAY    VALSARTAN (DIOVAN) 160 MG TABLET    TAKE 1 TABLET TWICE DAILY   Modified Medications    No medications on file   Discontinued Medications    No medications on file        Review of Systems   Constitutional: Negative for fever.   HENT:  Negative for nosebleeds.    Cardiovascular:  Negative for chest pain, dyspnea on exertion, leg swelling, near-syncope, orthopnea, paroxysmal nocturnal dyspnea and syncope.        As above   Respiratory:  Negative for hemoptysis.    Hematologic/Lymphatic: Negative for bleeding problem.   Skin:  Negative for poor wound healing.   Gastrointestinal:  Negative for hematochezia.   Genitourinary:  " Negative for hematuria.   Neurological:  Negative for light-headedness.   Allergic/Immunologic: Negative for persistent infections.       Objective:   Vitals  Vitals:    12/01/22 0833   BP: (!) 118/56   Pulse: (!) 54   SpO2: 100%   Weight: 109.8 kg (242 lb)          Physical Exam  Constitutional:       General: She is not in acute distress.     Appearance: She is well-developed. She is not diaphoretic.   HENT:      Head: Normocephalic.   Neck:      Vascular: No JVD.   Cardiovascular:      Rate and Rhythm: Normal rate and regular rhythm.      Heart sounds: No murmur heard.    No friction rub. No gallop.   Pulmonary:      Effort: Pulmonary effort is normal. No respiratory distress.      Breath sounds: Normal breath sounds.   Abdominal:      Palpations: Abdomen is soft.      Tenderness: There is no abdominal tenderness.   Musculoskeletal:         General: No swelling.      Cervical back: Normal range of motion.   Skin:     General: Skin is warm.   Neurological:      Mental Status: She is alert.   Psychiatric:         Mood and Affect: Mood normal.           Assessment:     1. Palpitations    2. Coronary artery disease involving native coronary artery of native heart without angina pectoris    3. Diabetes mellitus without complication    4. Hypertension associated with diabetes    5. Hyperlipidemia associated with type 2 diabetes mellitus    6. Morbid obesity    7. BAM (obstructive sleep apnea)        Plan:   Vivienne Jose is a 72 y.o. female h/o CAD, DM2, HTN, HLD, obesity, BAM    - EKG personally reviewed. My interpretation:  - 12/1/22: Possible Afib with junctional escape rhythm (regular QRS) with occasional sinus beats. RBBB, LAD. Non-sp ST-T abn. Qtc 409  - 2/20/19: NSR 80s. LAFB, RBBB  - Echo 2015: LVEF 60-65%. Normal diastology. Normal RVSF.    Palpitations  - Discussed Afib on EKG.  - CHADS2-VASc 4 (HTN, Age x1, DM, female)  - Discussed DOAC, coumadin. Her mother is on apixaban. She prefers to defer AC at this  "time, but will think about this after monitor and echo will re-visit.  - Event monitor, echocardiogram    CAD  - Cardiac CTA 2007 ([employer]): Codominant system. Foci of significant stenosis in pLAD. Myocardial bridge in RI. Focal stenosis at origin of LCx <50%  - Cath 2007 ([employer] Documents): R-dominant. LAD 30%, RCA 20%  - PET stress 2017: Negative for ischemia (EKG, PET). Normal LVEF  - Aspirin, statin  - Does not recall trying another statin before. LDL in a good range.    DM  Lab Results   Component Value Date    HGBA1C 6.1 (H) 08/04/2022   Managed by PCP    HTN  BP well controlled  Amlodipine, spironolactone, valsartan  Discussed watching BP after cutting back on amlodipine    HLD  Lab Results   Component Value Date    LDLCALC 67.2 02/07/2022   Statin as above    Obesity  Body mass index is 44.26 kg/m².  Estimated body mass index is 44.26 kg/m² as calculated from the following:    Height as of 10/14/22: 5' 2" (1.575 m).    Weight as of this encounter: 109.8 kg (242 lb).  Discussed diet and exercise      Continue with current medical plan and lifestyle changes.    Orders Placed This Encounter   Procedures    Cardiac event monitor     Standing Status:   Future     Standing Expiration Date:   12/1/2023     Order Specific Question:   Cardiac Event Monitor     Answer:   Auto Trigger     Order Specific Question:   Release to patient     Answer:   Immediate    Echo     Standing Status:   Future     Standing Expiration Date:   12/1/2023     Order Specific Question:   Release to patient     Answer:   Immediate       Follow up as scheduled  Return sooner for concerns or questions. If symptoms persist go to the ED    She expressed verbal understanding and agreed with the plan      Hilda Odonnell MD  Interventional Cardiology  [facility]  Phone: [phone]    "

## 2022-12-05 ENCOUNTER — LAB VISIT (OUTPATIENT)
Dept: LAB | Facility: HOSPITAL | Age: 72
End: 2022-12-05
Attending: INTERNAL MEDICINE
Payer: MEDICARE

## 2022-12-05 DIAGNOSIS — K74.69 OTHER CIRRHOSIS OF LIVER: ICD-10-CM

## 2022-12-05 LAB
ALBUMIN SERPL BCP-MCNC: 3.5 G/DL (ref 3.5–5.2)
ALP SERPL-CCNC: 87 U/L (ref 55–135)
ALT SERPL W/O P-5'-P-CCNC: 26 U/L (ref 10–44)
ANION GAP SERPL CALC-SCNC: 10 MMOL/L (ref 8–16)
AST SERPL-CCNC: 27 U/L (ref 10–40)
BASOPHILS # BLD AUTO: 0.04 K/UL (ref 0–0.2)
BASOPHILS NFR BLD: 0.4 % (ref 0–1.9)
BILIRUB DIRECT SERPL-MCNC: 0.2 MG/DL (ref 0.1–0.3)
BILIRUB SERPL-MCNC: 0.4 MG/DL (ref 0.1–1)
BUN SERPL-MCNC: 20 MG/DL (ref 8–23)
CALCIUM SERPL-MCNC: 10.4 MG/DL (ref 8.7–10.5)
CHLORIDE SERPL-SCNC: 104 MMOL/L (ref 95–110)
CO2 SERPL-SCNC: 26 MMOL/L (ref 23–29)
CREAT SERPL-MCNC: 1 MG/DL (ref 0.5–1.4)
DIFFERENTIAL METHOD: ABNORMAL
EOSINOPHIL # BLD AUTO: 0.3 K/UL (ref 0–0.5)
EOSINOPHIL NFR BLD: 2.8 % (ref 0–8)
ERYTHROCYTE [DISTWIDTH] IN BLOOD BY AUTOMATED COUNT: 14.6 % (ref 11.5–14.5)
EST. GFR  (NO RACE VARIABLE): >60 ML/MIN/1.73 M^2
GLUCOSE SERPL-MCNC: 88 MG/DL (ref 70–110)
HCT VFR BLD AUTO: 40.9 % (ref 37–48.5)
HGB BLD-MCNC: 13.1 G/DL (ref 12–16)
IMM GRANULOCYTES # BLD AUTO: 0.03 K/UL (ref 0–0.04)
IMM GRANULOCYTES NFR BLD AUTO: 0.3 % (ref 0–0.5)
INR PPP: 1 (ref 0.8–1.2)
LYMPHOCYTES # BLD AUTO: 2.6 K/UL (ref 1–4.8)
LYMPHOCYTES NFR BLD: 26.3 % (ref 18–48)
MCH RBC QN AUTO: 31.9 PG (ref 27–31)
MCHC RBC AUTO-ENTMCNC: 32 G/DL (ref 32–36)
MCV RBC AUTO: 100 FL (ref 82–98)
MONOCYTES # BLD AUTO: 0.8 K/UL (ref 0.3–1)
MONOCYTES NFR BLD: 7.9 % (ref 4–15)
NEUTROPHILS # BLD AUTO: 6.1 K/UL (ref 1.8–7.7)
NEUTROPHILS NFR BLD: 62.3 % (ref 38–73)
NRBC BLD-RTO: 0 /100 WBC
PLATELET # BLD AUTO: 292 K/UL (ref 150–450)
PMV BLD AUTO: 10.6 FL (ref 9.2–12.9)
POTASSIUM SERPL-SCNC: 4.6 MMOL/L (ref 3.5–5.1)
PROT SERPL-MCNC: 7.8 G/DL (ref 6–8.4)
PROTHROMBIN TIME: 10.7 SEC (ref 9–12.5)
RBC # BLD AUTO: 4.11 M/UL (ref 4–5.4)
SODIUM SERPL-SCNC: 140 MMOL/L (ref 136–145)
WBC # BLD AUTO: 9.77 K/UL (ref 3.9–12.7)

## 2022-12-05 PROCEDURE — 82105 ALPHA-FETOPROTEIN SERUM: CPT | Performed by: INTERNAL MEDICINE

## 2022-12-05 PROCEDURE — 82248 BILIRUBIN DIRECT: CPT | Performed by: INTERNAL MEDICINE

## 2022-12-05 PROCEDURE — 80053 COMPREHEN METABOLIC PANEL: CPT | Performed by: INTERNAL MEDICINE

## 2022-12-05 PROCEDURE — 85610 PROTHROMBIN TIME: CPT | Performed by: INTERNAL MEDICINE

## 2022-12-05 PROCEDURE — 36415 COLL VENOUS BLD VENIPUNCTURE: CPT | Mod: PO | Performed by: INTERNAL MEDICINE

## 2022-12-05 PROCEDURE — 85025 COMPLETE CBC W/AUTO DIFF WBC: CPT | Performed by: INTERNAL MEDICINE

## 2022-12-06 LAB — AFP SERPL-MCNC: 8.1 NG/ML (ref 0–8.4)

## 2022-12-07 ENCOUNTER — CLINICAL SUPPORT (OUTPATIENT)
Dept: CARDIOLOGY | Facility: HOSPITAL | Age: 72
End: 2022-12-07
Attending: INTERNAL MEDICINE
Payer: MEDICARE

## 2022-12-07 ENCOUNTER — HOSPITAL ENCOUNTER (OUTPATIENT)
Dept: CARDIOLOGY | Facility: HOSPITAL | Age: 72
Discharge: HOME | End: 2022-12-07
Attending: INTERNAL MEDICINE
Payer: MEDICARE

## 2022-12-07 VITALS — WEIGHT: 242 LBS | HEIGHT: 62 IN | BODY MASS INDEX: 44.53 KG/M2

## 2022-12-07 DIAGNOSIS — R00.2 PALPITATIONS: ICD-10-CM

## 2022-12-07 LAB
AORTIC ROOT ANNULUS: 4.01 CM
AORTIC VALVE CUSP SEPERATION: 1.88 CM
AV INDEX (PROSTH): 0.85
AV MEAN GRADIENT: 3 MMHG
AV PEAK GRADIENT: 5 MMHG
AV VALVE AREA: 2.54 CM2
AV VELOCITY RATIO: 0.84
BSA FOR ECHO PROCEDURE: 2.19 M2
CV ECHO LV RWT: 0.45 CM
DOP CALC AO PEAK VEL: 1.17 M/S
DOP CALC AO VTI: 23.4 CM
DOP CALC LVOT AREA: 3 CM2
DOP CALC LVOT DIAMETER: 1.95 CM
DOP CALC LVOT PEAK VEL: 0.98 M/S
DOP CALC LVOT STROKE VOLUME: 59.4 CM3
DOP CALC MV VTI: 22.8 CM
DOP CALCLVOT PEAK VEL VTI: 19.9 CM
E WAVE DECELERATION TIME: 294.43 MSEC
E/A RATIO: 1.1
E/E' RATIO: 11.14 M/S
ECHO LV POSTERIOR WALL: 1.13 CM (ref 0.6–1.1)
EJECTION FRACTION: 60 %
FRACTIONAL SHORTENING: 33 % (ref 28–44)
INTERVENTRICULAR SEPTUM: 1.09 CM (ref 0.6–1.1)
IVRT: 66.6 MSEC
LA MAJOR: 4.7 CM
LA MINOR: 5.14 CM
LA WIDTH: 3.4 CM
LEFT ATRIUM SIZE: 3.88 CM
LEFT ATRIUM VOLUME INDEX MOD: 18.3 ML/M2
LEFT ATRIUM VOLUME INDEX: 26.6 ML/M2
LEFT ATRIUM VOLUME MOD: 37.93 CM3
LEFT ATRIUM VOLUME: 55.06 CM3
LEFT INTERNAL DIMENSION IN SYSTOLE: 3.39 CM (ref 2.1–4)
LEFT VENTRICLE DIASTOLIC VOLUME INDEX: 58.85 ML/M2
LEFT VENTRICLE DIASTOLIC VOLUME: 121.82 ML
LEFT VENTRICLE MASS INDEX: 103 G/M2
LEFT VENTRICLE SYSTOLIC VOLUME INDEX: 22.7 ML/M2
LEFT VENTRICLE SYSTOLIC VOLUME: 46.99 ML
LEFT VENTRICULAR INTERNAL DIMENSION IN DIASTOLE: 5.06 CM (ref 3.5–6)
LEFT VENTRICULAR MASS: 213.82 G
LV LATERAL E/E' RATIO: 9.75 M/S
LV SEPTAL E/E' RATIO: 13 M/S
LVOT MG: 1.83 MMHG
LVOT MV: 0.62 CM/S
MV MEAN GRADIENT: 1 MMHG
MV PEAK A VEL: 0.71 M/S
MV PEAK E VEL: 0.78 M/S
MV PEAK GRADIENT: 2 MMHG
MV STENOSIS PRESSURE HALF TIME: 85.38 MS
MV VALVE AREA BY CONTINUITY EQUATION: 2.61 CM2
MV VALVE AREA P 1/2 METHOD: 2.58 CM2
PISA TR MAX VEL: 2.29 M/S
PV MV: 0.93 M/S
PV PEAK VELOCITY: 1.27 CM/S
RA PRESSURE: 8 MMHG
RIGHT VENTRICULAR END-DIASTOLIC DIMENSION: 2.51 CM
TDI LATERAL: 0.08 M/S
TDI SEPTAL: 0.06 M/S
TDI: 0.07 M/S
TR MAX PG: 21 MMHG
TRICUSPID ANNULAR PLANE SYSTOLIC EXCURSION: 1.75 CM
TV REST PULMONARY ARTERY PRESSURE: 29 MMHG

## 2022-12-07 PROCEDURE — 93272 CARDIAC EVENT MONITOR (CUPID ONLY): ICD-10-PCS | Mod: HCNC,,, | Performed by: INTERNAL MEDICINE

## 2022-12-07 PROCEDURE — 93306 TTE W/DOPPLER COMPLETE: CPT | Mod: 26,,, | Performed by: INTERNAL MEDICINE

## 2022-12-07 PROCEDURE — 93272 ECG/REVIEW INTERPRET ONLY: CPT | Mod: HCNC,,, | Performed by: INTERNAL MEDICINE

## 2022-12-07 PROCEDURE — 93306 TTE W/DOPPLER COMPLETE: CPT

## 2022-12-07 PROCEDURE — 93306 ECHO (CUPID ONLY): ICD-10-PCS | Mod: 26,,, | Performed by: INTERNAL MEDICINE

## 2022-12-07 PROCEDURE — 93270 REMOTE 30 DAY ECG REV/REPORT: CPT

## 2022-12-16 ENCOUNTER — TELEPHONE (OUTPATIENT)
Dept: ELECTROPHYSIOLOGY | Facility: CLINIC | Age: 72
End: 2022-12-16
Payer: MEDICARE

## 2022-12-16 NOTE — TELEPHONE ENCOUNTER
"Patient wearing 30 day event monitor for diagnosis palpitations     Received auto-triggered alert notification for new onset AF on 12/15/22 at 0850, V rate at 70        Called patient to assess for symptoms. States she felt intermittent thru out the day palpitations, "can't catch my breath" and lightheaded, but can't say she noted times that she felt these symptoms.    Not on OAC.    Strips placed under this encounter for review. Message sent to ordering provider. Will continue to monitor until 1/12/2023        "

## 2022-12-19 ENCOUNTER — CLINICAL SUPPORT (OUTPATIENT)
Dept: INTERNAL MEDICINE | Facility: CLINIC | Age: 72
End: 2022-12-19
Payer: MEDICARE

## 2022-12-19 ENCOUNTER — PATIENT MESSAGE (OUTPATIENT)
Dept: CARDIOLOGY | Facility: CLINIC | Age: 72
End: 2022-12-19
Payer: MEDICARE

## 2022-12-19 DIAGNOSIS — J30.9 CHRONIC ALLERGIC RHINITIS: Primary | ICD-10-CM

## 2022-12-19 PROCEDURE — 95115 PR IMMUNOTHERAPY, ONE INJECTION: ICD-10-PCS | Mod: S$GLB,,, | Performed by: FAMILY MEDICINE

## 2022-12-19 PROCEDURE — 95115 IMMUNOTHERAPY ONE INJECTION: CPT | Mod: S$GLB,,, | Performed by: FAMILY MEDICINE

## 2022-12-28 ENCOUNTER — HOSPITAL ENCOUNTER (OUTPATIENT)
Dept: RADIOLOGY | Facility: HOSPITAL | Age: 72
Discharge: HOME | End: 2022-12-28
Attending: INTERNAL MEDICINE
Payer: MEDICARE

## 2022-12-28 ENCOUNTER — OFFICE VISIT (OUTPATIENT)
Dept: INTERNAL MEDICINE | Facility: CLINIC | Age: 72
End: 2022-12-28
Payer: MEDICARE

## 2022-12-28 VITALS
SYSTOLIC BLOOD PRESSURE: 110 MMHG | HEIGHT: 62 IN | DIASTOLIC BLOOD PRESSURE: 58 MMHG | WEIGHT: 234.44 LBS | OXYGEN SATURATION: 98 % | BODY MASS INDEX: 43.14 KG/M2 | HEART RATE: 74 BPM

## 2022-12-28 DIAGNOSIS — E66.01 CLASS 3 SEVERE OBESITY DUE TO EXCESS CALORIES WITH SERIOUS COMORBIDITY AND BODY MASS INDEX (BMI) OF 40.0 TO 44.9 IN ADULT: ICD-10-CM

## 2022-12-28 DIAGNOSIS — M54.31 SCIATICA OF RIGHT SIDE: ICD-10-CM

## 2022-12-28 DIAGNOSIS — Z79.4 TYPE 2 DIABETES MELLITUS WITHOUT COMPLICATION, WITH LONG-TERM CURRENT USE OF INSULIN: ICD-10-CM

## 2022-12-28 DIAGNOSIS — M25.551 ACUTE PAIN OF RIGHT HIP: ICD-10-CM

## 2022-12-28 DIAGNOSIS — M25.551 ACUTE PAIN OF RIGHT HIP: Primary | ICD-10-CM

## 2022-12-28 DIAGNOSIS — E11.9 TYPE 2 DIABETES MELLITUS WITHOUT COMPLICATION, WITH LONG-TERM CURRENT USE OF INSULIN: ICD-10-CM

## 2022-12-28 PROCEDURE — 3066F NEPHROPATHY DOC TX: CPT | Mod: CPTII,S$GLB,, | Performed by: INTERNAL MEDICINE

## 2022-12-28 PROCEDURE — 1157F ADVNC CARE PLAN IN RCRD: CPT | Mod: CPTII,S$GLB,, | Performed by: INTERNAL MEDICINE

## 2022-12-28 PROCEDURE — 3008F BODY MASS INDEX DOCD: CPT | Mod: CPTII,S$GLB,, | Performed by: INTERNAL MEDICINE

## 2022-12-28 PROCEDURE — 3066F PR DOCUMENTATION OF TREATMENT FOR NEPHROPATHY: ICD-10-PCS | Mod: CPTII,S$GLB,, | Performed by: INTERNAL MEDICINE

## 2022-12-28 PROCEDURE — 1125F PR PAIN SEVERITY QUANTIFIED, PAIN PRESENT: ICD-10-PCS | Mod: CPTII,S$GLB,, | Performed by: INTERNAL MEDICINE

## 2022-12-28 PROCEDURE — 99213 OFFICE O/P EST LOW 20 MIN: CPT | Mod: S$GLB,,, | Performed by: INTERNAL MEDICINE

## 2022-12-28 PROCEDURE — 3078F PR MOST RECENT DIASTOLIC BLOOD PRESSURE < 80 MM HG: ICD-10-PCS | Mod: CPTII,S$GLB,, | Performed by: INTERNAL MEDICINE

## 2022-12-28 PROCEDURE — 3288F PR FALLS RISK ASSESSMENT DOCUMENTED: ICD-10-PCS | Mod: CPTII,S$GLB,, | Performed by: INTERNAL MEDICINE

## 2022-12-28 PROCEDURE — 3008F PR BODY MASS INDEX (BMI) DOCUMENTED: ICD-10-PCS | Mod: CPTII,S$GLB,, | Performed by: INTERNAL MEDICINE

## 2022-12-28 PROCEDURE — 3078F DIAST BP <80 MM HG: CPT | Mod: CPTII,S$GLB,, | Performed by: INTERNAL MEDICINE

## 2022-12-28 PROCEDURE — 1157F PR ADVANCE CARE PLAN OR EQUIV PRESENT IN MEDICAL RECORD: ICD-10-PCS | Mod: CPTII,S$GLB,, | Performed by: INTERNAL MEDICINE

## 2022-12-28 PROCEDURE — 72100 X-RAY EXAM L-S SPINE 2/3 VWS: CPT | Mod: 26,,, | Performed by: RADIOLOGY

## 2022-12-28 PROCEDURE — 3044F PR MOST RECENT HEMOGLOBIN A1C LEVEL <7.0%: ICD-10-PCS | Mod: CPTII,S$GLB,, | Performed by: INTERNAL MEDICINE

## 2022-12-28 PROCEDURE — 1160F RVW MEDS BY RX/DR IN RCRD: CPT | Mod: CPTII,S$GLB,, | Performed by: INTERNAL MEDICINE

## 2022-12-28 PROCEDURE — 1159F PR MEDICATION LIST DOCUMENTED IN MEDICAL RECORD: ICD-10-PCS | Mod: CPTII,S$GLB,, | Performed by: INTERNAL MEDICINE

## 2022-12-28 PROCEDURE — 99213 PR OFFICE/OUTPT VISIT, EST, LEVL III, 20-29 MIN: ICD-10-PCS | Mod: S$GLB,,, | Performed by: INTERNAL MEDICINE

## 2022-12-28 PROCEDURE — 99999 PR PBB SHADOW E&M-EST. PATIENT-LVL III: CPT | Mod: PBBFAC,,, | Performed by: INTERNAL MEDICINE

## 2022-12-28 PROCEDURE — 3288F FALL RISK ASSESSMENT DOCD: CPT | Mod: CPTII,S$GLB,, | Performed by: INTERNAL MEDICINE

## 2022-12-28 PROCEDURE — 3044F HG A1C LEVEL LT 7.0%: CPT | Mod: CPTII,S$GLB,, | Performed by: INTERNAL MEDICINE

## 2022-12-28 PROCEDURE — 1159F MED LIST DOCD IN RCRD: CPT | Mod: CPTII,S$GLB,, | Performed by: INTERNAL MEDICINE

## 2022-12-28 PROCEDURE — 99999 PR PBB SHADOW E&M-EST. PATIENT-LVL III: ICD-10-PCS | Mod: PBBFAC,,, | Performed by: INTERNAL MEDICINE

## 2022-12-28 PROCEDURE — 3072F LOW RISK FOR RETINOPATHY: CPT | Mod: CPTII,S$GLB,, | Performed by: INTERNAL MEDICINE

## 2022-12-28 PROCEDURE — 1125F AMNT PAIN NOTED PAIN PRSNT: CPT | Mod: CPTII,S$GLB,, | Performed by: INTERNAL MEDICINE

## 2022-12-28 PROCEDURE — 3074F PR MOST RECENT SYSTOLIC BLOOD PRESSURE < 130 MM HG: ICD-10-PCS | Mod: CPTII,S$GLB,, | Performed by: INTERNAL MEDICINE

## 2022-12-28 PROCEDURE — 72100 XR LUMBAR SPINE AP AND LATERAL: ICD-10-PCS | Mod: 26,,, | Performed by: RADIOLOGY

## 2022-12-28 PROCEDURE — 1101F PT FALLS ASSESS-DOCD LE1/YR: CPT | Mod: CPTII,S$GLB,, | Performed by: INTERNAL MEDICINE

## 2022-12-28 PROCEDURE — 3061F NEG MICROALBUMINURIA REV: CPT | Mod: CPTII,S$GLB,, | Performed by: INTERNAL MEDICINE

## 2022-12-28 PROCEDURE — 3074F SYST BP LT 130 MM HG: CPT | Mod: CPTII,S$GLB,, | Performed by: INTERNAL MEDICINE

## 2022-12-28 PROCEDURE — 3072F PR LOW RISK FOR RETINOPATHY: ICD-10-PCS | Mod: CPTII,S$GLB,, | Performed by: INTERNAL MEDICINE

## 2022-12-28 PROCEDURE — 1160F PR REVIEW ALL MEDS BY PRESCRIBER/CLIN PHARMACIST DOCUMENTED: ICD-10-PCS | Mod: CPTII,S$GLB,, | Performed by: INTERNAL MEDICINE

## 2022-12-28 PROCEDURE — 3061F PR NEG MICROALBUMINURIA RESULT DOCUMENTED/REVIEW: ICD-10-PCS | Mod: CPTII,S$GLB,, | Performed by: INTERNAL MEDICINE

## 2022-12-28 PROCEDURE — 4010F ACE/ARB THERAPY RXD/TAKEN: CPT | Mod: CPTII,S$GLB,, | Performed by: INTERNAL MEDICINE

## 2022-12-28 PROCEDURE — 4010F PR ACE/ARB THEARPY RXD/TAKEN: ICD-10-PCS | Mod: CPTII,S$GLB,, | Performed by: INTERNAL MEDICINE

## 2022-12-28 PROCEDURE — 1101F PR PT FALLS ASSESS DOC 0-1 FALLS W/OUT INJ PAST YR: ICD-10-PCS | Mod: CPTII,S$GLB,, | Performed by: INTERNAL MEDICINE

## 2022-12-28 PROCEDURE — 72100 X-RAY EXAM L-S SPINE 2/3 VWS: CPT | Mod: TC

## 2022-12-28 RX ORDER — TRAMADOL HYDROCHLORIDE 50 MG/1
50 TABLET ORAL EVERY 8 HOURS PRN
Qty: 21 TABLET | Refills: 0 | Status: SHIPPED | OUTPATIENT
Start: 2022-12-28 | End: 2023-06-08

## 2022-12-28 RX ORDER — CYCLOBENZAPRINE HCL 5 MG
5 TABLET ORAL 2 TIMES DAILY PRN
Qty: 60 TABLET | Refills: 0 | Status: SHIPPED | OUTPATIENT
Start: 2022-12-28 | End: 2023-06-08

## 2022-12-28 NOTE — PROGRESS NOTES
"Subjective:      Patient ID: Vivienne Jose is a 72 y.o. female.    Chief Complaint:   Chief Complaint   Patient presents with    Hip Pain    Leg Pain              73yo F with lumbar/cervical OA, HTN, T2DM (8/2022 HgbA1C 6.1), CKD here for UC visit. PCP listed as Aislinn Magallon MD. Accompanied by .    Began having R sided hip pain after getting out of bed to use restroom 2 nights ago. Now pain moving down R leg. Taking 4 Tylenol daily, no improvement. No trauma, injuries, falls. Described as soreness within hip while pain moving down leg is stabbing. No saddle anesthesia, bladder/bowel incontinence, unilateral weakness. Using cane to ambulate.     Prescribed Gabapentin for neuropathy; tapered off of medication and has not been taking for over 1 month.        Review of Systems   Constitutional:  Negative for chills and fever.   HENT:  Negative for congestion.    Respiratory:  Negative for cough and shortness of breath.    Cardiovascular:  Negative for chest pain.   Gastrointestinal:  Negative for constipation, nausea and vomiting.   Genitourinary:  Negative for hematuria and urgency.   Musculoskeletal:  Positive for joint pain. Negative for falls.   Skin:  Negative for rash.   Neurological:  Negative for dizziness and loss of consciousness.     Objective:     BP (!) 110/58 (BP Location: Left arm, Patient Position: Sitting, BP Method: Large (Manual))   Pulse 74   Ht 5' 2" (1.575 m)   Wt 106.4 kg (234 lb 7.4 oz)   LMP  (LMP Unknown)   SpO2 98%   BMI 42.88 kg/m²     Physical Exam  Constitutional:       Appearance: She is well-developed. She is obese.      Comments: Able to ambulate and ascend exam table.    HENT:      Head: Normocephalic.      Right Ear: External ear normal.      Left Ear: External ear normal.   Eyes:      Pupils: Pupils are equal, round, and reactive to light.   Cardiovascular:      Rate and Rhythm: Normal rate and regular rhythm.      Heart sounds: Normal heart sounds.   Pulmonary:     "  Effort: Pulmonary effort is normal.   Abdominal:      General: Bowel sounds are normal.      Palpations: Abdomen is soft.   Musculoskeletal:      Comments: SLR positive on R. No paraspinal tenderness.    Skin:     Findings: No rash.   Neurological:      Mental Status: She is alert and oriented to person, place, and time.     Assessment/Plan     Acute pain of right hip  -     X-Ray Lumbar Spine AP And Lateral; Future; Expected date: 12/28/2022    Sciatica of right side  Imaging, pain control; advised to restart Gabapentin 300 BID  Discussed PT, deferring  -     traMADoL (ULTRAM) 50 mg tablet; Take 1 tablet (50 mg total) by mouth every 8 (eight) hours as needed for Pain.  Dispense: 21 tablet; Refill: 0  -     cyclobenzaprine (FLEXERIL) 5 MG tablet; Take 1 tablet (5 mg total) by mouth 2 (two) times daily as needed for Muscle spasms. May cause sedation.  Dispense: 60 tablet; Refill: 0  -     X-Ray Lumbar Spine AP And Lateral; Future; Expected date: 12/28/2022    Type 2 diabetes mellitus without complication, with long-term current use of insulin    Class 3 severe obesity due to excess calories with serious comorbidity and body mass index (BMI) of 40.0 to 44.9 in adult    Follow-up PRN.    Litzy Clayton MD  Department of Internal Medicine - [facility]  12/28/2022

## 2023-01-03 DIAGNOSIS — N95.2 VAGINAL ATROPHY: ICD-10-CM

## 2023-01-03 DIAGNOSIS — N39.0 RECURRENT UTI: ICD-10-CM

## 2023-01-03 RX ORDER — ESTRADIOL 2 MG/1
SYSTEM VAGINAL
OUTPATIENT
Start: 2023-01-03

## 2023-01-03 NOTE — TELEPHONE ENCOUNTER
I spoke with patient, and advised her that I called the patient's pharmacy and was told that they have to order her estring, patient stated ok she will pick it this week.

## 2023-01-09 ENCOUNTER — CLINICAL SUPPORT (OUTPATIENT)
Dept: INTERNAL MEDICINE | Facility: CLINIC | Age: 73
End: 2023-01-09
Payer: MEDICARE

## 2023-01-09 ENCOUNTER — LAB VISIT (OUTPATIENT)
Dept: LAB | Facility: HOSPITAL | Age: 73
End: 2023-01-09
Attending: INTERNAL MEDICINE
Payer: MEDICARE

## 2023-01-09 DIAGNOSIS — E11.22 TYPE 2 DIABETES MELLITUS WITH STAGE 3A CHRONIC KIDNEY DISEASE, WITH LONG-TERM CURRENT USE OF INSULIN: ICD-10-CM

## 2023-01-09 DIAGNOSIS — K74.69 OTHER CIRRHOSIS OF LIVER: ICD-10-CM

## 2023-01-09 DIAGNOSIS — N18.31 TYPE 2 DIABETES MELLITUS WITH STAGE 3A CHRONIC KIDNEY DISEASE, WITH LONG-TERM CURRENT USE OF INSULIN: ICD-10-CM

## 2023-01-09 DIAGNOSIS — Z79.4 TYPE 2 DIABETES MELLITUS WITH STAGE 3A CHRONIC KIDNEY DISEASE, WITH LONG-TERM CURRENT USE OF INSULIN: ICD-10-CM

## 2023-01-09 DIAGNOSIS — J30.9 CHRONIC ALLERGIC RHINITIS: Primary | ICD-10-CM

## 2023-01-09 DIAGNOSIS — R60.9 EDEMA, UNSPECIFIED TYPE: ICD-10-CM

## 2023-01-09 LAB
AFP SERPL-MCNC: 7.4 NG/ML (ref 0–8.4)
ALBUMIN SERPL BCP-MCNC: 3.6 G/DL (ref 3.5–5.2)
ALP SERPL-CCNC: 108 U/L (ref 55–135)
ALT SERPL W/O P-5'-P-CCNC: 24 U/L (ref 10–44)
ANION GAP SERPL CALC-SCNC: 11 MMOL/L (ref 8–16)
AST SERPL-CCNC: 31 U/L (ref 10–40)
BASOPHILS # BLD AUTO: 0.05 K/UL (ref 0–0.2)
BASOPHILS NFR BLD: 0.6 % (ref 0–1.9)
BILIRUB DIRECT SERPL-MCNC: 0.2 MG/DL (ref 0.1–0.3)
BILIRUB SERPL-MCNC: 0.3 MG/DL (ref 0.1–1)
BUN SERPL-MCNC: 29 MG/DL (ref 8–23)
CALCIUM SERPL-MCNC: 10.1 MG/DL (ref 8.7–10.5)
CHLORIDE SERPL-SCNC: 103 MMOL/L (ref 95–110)
CO2 SERPL-SCNC: 25 MMOL/L (ref 23–29)
CREAT SERPL-MCNC: 1.1 MG/DL (ref 0.5–1.4)
DIFFERENTIAL METHOD: ABNORMAL
EOSINOPHIL # BLD AUTO: 0.3 K/UL (ref 0–0.5)
EOSINOPHIL NFR BLD: 3.3 % (ref 0–8)
ERYTHROCYTE [DISTWIDTH] IN BLOOD BY AUTOMATED COUNT: 14.4 % (ref 11.5–14.5)
EST. GFR  (NO RACE VARIABLE): 53.7 ML/MIN/1.73 M^2
GLUCOSE SERPL-MCNC: 139 MG/DL (ref 70–110)
HCT VFR BLD AUTO: 40.8 % (ref 37–48.5)
HGB BLD-MCNC: 12.7 G/DL (ref 12–16)
IMM GRANULOCYTES # BLD AUTO: 0.03 K/UL (ref 0–0.04)
IMM GRANULOCYTES NFR BLD AUTO: 0.3 % (ref 0–0.5)
INR PPP: 1 (ref 0.8–1.2)
LYMPHOCYTES # BLD AUTO: 1.8 K/UL (ref 1–4.8)
LYMPHOCYTES NFR BLD: 21 % (ref 18–48)
MCH RBC QN AUTO: 32.2 PG (ref 27–31)
MCHC RBC AUTO-ENTMCNC: 31.1 G/DL (ref 32–36)
MCV RBC AUTO: 103 FL (ref 82–98)
MONOCYTES # BLD AUTO: 0.8 K/UL (ref 0.3–1)
MONOCYTES NFR BLD: 8.9 % (ref 4–15)
NEUTROPHILS # BLD AUTO: 5.7 K/UL (ref 1.8–7.7)
NEUTROPHILS NFR BLD: 65.9 % (ref 38–73)
NRBC BLD-RTO: 0 /100 WBC
PLATELET # BLD AUTO: 288 K/UL (ref 150–450)
PMV BLD AUTO: 10.5 FL (ref 9.2–12.9)
POTASSIUM SERPL-SCNC: 4.8 MMOL/L (ref 3.5–5.1)
PROT SERPL-MCNC: 7.8 G/DL (ref 6–8.4)
PROTHROMBIN TIME: 10.3 SEC (ref 9–12.5)
RBC # BLD AUTO: 3.95 M/UL (ref 4–5.4)
SODIUM SERPL-SCNC: 139 MMOL/L (ref 136–145)
WBC # BLD AUTO: 8.7 K/UL (ref 3.9–12.7)

## 2023-01-09 PROCEDURE — 95115 PR IMMUNOTHERAPY, ONE INJECTION: ICD-10-PCS | Mod: HCNC,S$GLB,, | Performed by: FAMILY MEDICINE

## 2023-01-09 PROCEDURE — 82248 BILIRUBIN DIRECT: CPT | Mod: HCNC | Performed by: INTERNAL MEDICINE

## 2023-01-09 PROCEDURE — 85025 COMPLETE CBC W/AUTO DIFF WBC: CPT | Mod: HCNC | Performed by: INTERNAL MEDICINE

## 2023-01-09 PROCEDURE — 80053 COMPREHEN METABOLIC PANEL: CPT | Mod: HCNC | Performed by: INTERNAL MEDICINE

## 2023-01-09 PROCEDURE — 85610 PROTHROMBIN TIME: CPT | Mod: HCNC | Performed by: INTERNAL MEDICINE

## 2023-01-09 PROCEDURE — 95115 IMMUNOTHERAPY ONE INJECTION: CPT | Mod: HCNC,S$GLB,, | Performed by: FAMILY MEDICINE

## 2023-01-09 PROCEDURE — 82105 ALPHA-FETOPROTEIN SERUM: CPT | Mod: HCNC | Performed by: INTERNAL MEDICINE

## 2023-01-09 PROCEDURE — 36415 COLL VENOUS BLD VENIPUNCTURE: CPT | Mod: HCNC,PO | Performed by: INTERNAL MEDICINE

## 2023-01-09 RX ORDER — FUROSEMIDE 20 MG/1
20 TABLET ORAL DAILY
Qty: 30 TABLET | Refills: 11 | Status: SHIPPED | OUTPATIENT
Start: 2023-01-09 | End: 2024-03-11 | Stop reason: SDUPTHER

## 2023-01-09 RX ORDER — SPIRONOLACTONE 50 MG/1
50 TABLET, FILM COATED ORAL DAILY
Qty: 30 TABLET | Refills: 11 | Status: SHIPPED | OUTPATIENT
Start: 2023-01-09 | End: 2024-03-11

## 2023-01-17 ENCOUNTER — OFFICE VISIT (OUTPATIENT)
Dept: PODIATRY | Facility: CLINIC | Age: 73
End: 2023-01-17
Payer: MEDICARE

## 2023-01-17 VITALS
BODY MASS INDEX: 42.88 KG/M2 | SYSTOLIC BLOOD PRESSURE: 129 MMHG | HEIGHT: 62 IN | HEART RATE: 91 BPM | DIASTOLIC BLOOD PRESSURE: 61 MMHG

## 2023-01-17 DIAGNOSIS — E11.9 ENCOUNTER FOR DIABETIC FOOT EXAM: ICD-10-CM

## 2023-01-17 DIAGNOSIS — M77.42 METATARSALGIA OF BOTH FEET: ICD-10-CM

## 2023-01-17 DIAGNOSIS — M77.41 METATARSALGIA OF BOTH FEET: ICD-10-CM

## 2023-01-17 DIAGNOSIS — E11.49 TYPE 2 DIABETES MELLITUS WITH NEUROLOGICAL MANIFESTATIONS: Primary | ICD-10-CM

## 2023-01-17 PROCEDURE — 99214 PR OFFICE/OUTPT VISIT, EST, LEVL IV, 30-39 MIN: ICD-10-PCS | Mod: HCNC,S$GLB,, | Performed by: STUDENT IN AN ORGANIZED HEALTH CARE EDUCATION/TRAINING PROGRAM

## 2023-01-17 PROCEDURE — 1157F ADVNC CARE PLAN IN RCRD: CPT | Mod: HCNC,CPTII,S$GLB, | Performed by: STUDENT IN AN ORGANIZED HEALTH CARE EDUCATION/TRAINING PROGRAM

## 2023-01-17 PROCEDURE — 99999 PR PBB SHADOW E&M-EST. PATIENT-LVL IV: CPT | Mod: PBBFAC,HCNC,, | Performed by: STUDENT IN AN ORGANIZED HEALTH CARE EDUCATION/TRAINING PROGRAM

## 2023-01-17 PROCEDURE — 1157F PR ADVANCE CARE PLAN OR EQUIV PRESENT IN MEDICAL RECORD: ICD-10-PCS | Mod: HCNC,CPTII,S$GLB, | Performed by: STUDENT IN AN ORGANIZED HEALTH CARE EDUCATION/TRAINING PROGRAM

## 2023-01-17 PROCEDURE — 1125F PR PAIN SEVERITY QUANTIFIED, PAIN PRESENT: ICD-10-PCS | Mod: HCNC,CPTII,S$GLB, | Performed by: STUDENT IN AN ORGANIZED HEALTH CARE EDUCATION/TRAINING PROGRAM

## 2023-01-17 PROCEDURE — 3008F BODY MASS INDEX DOCD: CPT | Mod: HCNC,CPTII,S$GLB, | Performed by: STUDENT IN AN ORGANIZED HEALTH CARE EDUCATION/TRAINING PROGRAM

## 2023-01-17 PROCEDURE — 3074F PR MOST RECENT SYSTOLIC BLOOD PRESSURE < 130 MM HG: ICD-10-PCS | Mod: HCNC,CPTII,S$GLB, | Performed by: STUDENT IN AN ORGANIZED HEALTH CARE EDUCATION/TRAINING PROGRAM

## 2023-01-17 PROCEDURE — 1159F PR MEDICATION LIST DOCUMENTED IN MEDICAL RECORD: ICD-10-PCS | Mod: HCNC,CPTII,S$GLB, | Performed by: STUDENT IN AN ORGANIZED HEALTH CARE EDUCATION/TRAINING PROGRAM

## 2023-01-17 PROCEDURE — 3008F PR BODY MASS INDEX (BMI) DOCUMENTED: ICD-10-PCS | Mod: HCNC,CPTII,S$GLB, | Performed by: STUDENT IN AN ORGANIZED HEALTH CARE EDUCATION/TRAINING PROGRAM

## 2023-01-17 PROCEDURE — 99214 OFFICE O/P EST MOD 30 MIN: CPT | Mod: HCNC,S$GLB,, | Performed by: STUDENT IN AN ORGANIZED HEALTH CARE EDUCATION/TRAINING PROGRAM

## 2023-01-17 PROCEDURE — 3074F SYST BP LT 130 MM HG: CPT | Mod: HCNC,CPTII,S$GLB, | Performed by: STUDENT IN AN ORGANIZED HEALTH CARE EDUCATION/TRAINING PROGRAM

## 2023-01-17 PROCEDURE — 3078F DIAST BP <80 MM HG: CPT | Mod: HCNC,CPTII,S$GLB, | Performed by: STUDENT IN AN ORGANIZED HEALTH CARE EDUCATION/TRAINING PROGRAM

## 2023-01-17 PROCEDURE — 3072F PR LOW RISK FOR RETINOPATHY: ICD-10-PCS | Mod: HCNC,CPTII,S$GLB, | Performed by: STUDENT IN AN ORGANIZED HEALTH CARE EDUCATION/TRAINING PROGRAM

## 2023-01-17 PROCEDURE — 3072F LOW RISK FOR RETINOPATHY: CPT | Mod: HCNC,CPTII,S$GLB, | Performed by: STUDENT IN AN ORGANIZED HEALTH CARE EDUCATION/TRAINING PROGRAM

## 2023-01-17 PROCEDURE — 99999 PR PBB SHADOW E&M-EST. PATIENT-LVL IV: ICD-10-PCS | Mod: PBBFAC,HCNC,, | Performed by: STUDENT IN AN ORGANIZED HEALTH CARE EDUCATION/TRAINING PROGRAM

## 2023-01-17 PROCEDURE — 1125F AMNT PAIN NOTED PAIN PRSNT: CPT | Mod: HCNC,CPTII,S$GLB, | Performed by: STUDENT IN AN ORGANIZED HEALTH CARE EDUCATION/TRAINING PROGRAM

## 2023-01-17 PROCEDURE — 1159F MED LIST DOCD IN RCRD: CPT | Mod: HCNC,CPTII,S$GLB, | Performed by: STUDENT IN AN ORGANIZED HEALTH CARE EDUCATION/TRAINING PROGRAM

## 2023-01-17 PROCEDURE — 3078F PR MOST RECENT DIASTOLIC BLOOD PRESSURE < 80 MM HG: ICD-10-PCS | Mod: HCNC,CPTII,S$GLB, | Performed by: STUDENT IN AN ORGANIZED HEALTH CARE EDUCATION/TRAINING PROGRAM

## 2023-01-17 NOTE — PROGRESS NOTES
Subjective:      Patient ID: Vivienne Jose is a 72 y.o. female.    Chief Complaint: Diabetes Mellitus (Doc Clayton MD    12/28/2022) and Diabetic Foot Exam    Vivienne is a 72 y.o. female who presents to the clinic upon referral from Dr. Soledad crews. provider found  for evaluation and treatment of diabetic feet. Vivienne has a past medical history of Allergy, Angio-edema, Arthritis, Cataract, Cerebrovascular malformation, Cirrhosis (11/24/2020), Colon polyps, Coronary artery disease, Diabetes mellitus, type 2, Diabetic peripheral neuropathy, Edema of both feet (8/19/2022), GERD (gastroesophageal reflux disease), Herpes infection, Hyperlipidemia, Hypertension, Hypothyroidism, Kidney stones, Mild nonproliferative diabetic retinopathy of 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 edema associated with type 2 diabetes mellitus (4/18/2019), DEL RIO (nonalcoholic steatohepatitis) (8/19/2022), Nausea & vomiting (11/23/2015), Obesity, morbid, Ovarian cyst, Seizure disorder, focal motor, Sleep apnea, Type II or unspecified type diabetes mellitus with neurological manifestations, uncontrolled(250.62), and Urticaria. Patient relates no major problem with feet. Only complaints today consist of here for annual diabetic foot exam. States her foot pain is unchanged from previous visit, relates she normally sees Dr. Ohara. States she is having continued peripheral neuropathy and pain to the ball of her foot when she walks without shoes. States her great toes are sensitive, was told she had a bone spur at the tip of her toes, has refused surgery previously. States worsening sciatica to right side since last month, on side of previous knee surgery. Relates right leg is shorter than left and typically wears tennis shoes with small heel lift to compensate. No further pedal complaints.     PCP: Aislinn Magallon MD    Date Last Seen by PCP: per above    Current shoe gear:  CHLOÉ gale     Hemoglobin A1C   Date Value Ref Range Status   08/04/2022 6.1 (H) 4.0 -  5.6 % Final     Comment:     ADA Screening Guidelines:  5.7-6.4%  Consistent with prediabetes  >or=6.5%  Consistent with diabetes    High levels of fetal hemoglobin interfere with the HbA1C  assay. Heterozygous hemoglobin variants (HbS, HgC, etc)do  not significantly interfere with this assay.   However, presence of multiple variants may affect accuracy.     02/07/2022 6.6 (H) 4.0 - 5.6 % Final     Comment:     ADA Screening Guidelines:  5.7-6.4%  Consistent with prediabetes  >or=6.5%  Consistent with diabetes    High levels of fetal hemoglobin interfere with the HbA1C  assay. Heterozygous hemoglobin variants (HbS, HgC, etc)do  not significantly interfere with this assay.   However, presence of multiple variants may affect accuracy.     07/23/2021 6.2 (H) 4.0 - 5.6 % Final     Comment:     ADA Screening Guidelines:  5.7-6.4%  Consistent with prediabetes  >or=6.5%  Consistent with diabetes    High levels of fetal hemoglobin interfere with the HbA1C  assay. Heterozygous hemoglobin variants (HbS, HgC, etc)do  not significantly interfere with this assay.   However, presence of multiple variants may affect accuracy.           Review of Systems   Constitutional: Negative for chills, decreased appetite, diaphoresis and fever.   HENT:  Negative for congestion and hearing loss.    Cardiovascular:  Negative for chest pain, claudication, leg swelling and syncope.   Respiratory:  Negative for cough and shortness of breath.    Skin:  Positive for dry skin and nail changes. Negative for color change, flushing, itching, poor wound healing and rash.   Musculoskeletal:  Positive for arthritis and joint pain.   Gastrointestinal:  Negative for nausea and vomiting.   Neurological:  Positive for numbness and paresthesias. Negative for focal weakness and weakness.   Psychiatric/Behavioral:  Negative for altered mental status. The patient is not nervous/anxious.          Objective:      Physical Exam  Constitutional:       General: She is  not in acute distress.     Appearance: She is well-developed. She is not diaphoretic.   Cardiovascular:      Comments: Dorsalis pedis and posterior tibial pulses are within normal limits. Skin temperature is within normal limits. Toes are cool to touch and feet are warm proximally. Hair growth is diminished. Skin is mildly atrophic and with mild hyperpigmentation. Mild edema noted, bilaterally.   Musculoskeletal:      Comments: Adequate joint range of motion without pain, limitation, nor crepitation to bilateral feet and ankle joints. Muscle strength is 5/5 in all groups bilaterally.    Diffuse tenderness without pinpoint location to plantar forefoot, bilaterally        Lymphadenopathy:      Comments: Negative lymphangitic streaking    Skin:     General: Skin is warm and dry.      Findings: No lesion.      Comments: Skin is warm and dry, no acute signs of infection noted. No open wounds, macerations or hyperkeratotic lesions, bilaterally.     Toenails are well trimmed and mildly thickened, bilaterally      Neurological:      Mental Status: She is alert and oriented to person, place, and time.      Sensory: Sensory deficit present.      Motor: No abnormal muscle tone.      Comments: Light touch within normal limits. [place]-Ayo 5.07 monofilamant testing is diminished. Vibratory sensation  is diminished, bilaterally    Psychiatric:         Behavior: Behavior normal.         Thought Content: Thought content normal.         Judgment: Judgment normal.             Assessment:       Encounter Diagnoses   Name Primary?    Type 2 diabetes mellitus with neurological manifestations Yes    Encounter for diabetic foot exam     Metatarsalgia of both feet          Plan:       Viveinne was seen today for diabetes mellitus and diabetic foot exam.    Diagnoses and all orders for this visit:    Type 2 diabetes mellitus with neurological manifestations    Encounter for diabetic foot exam    Metatarsalgia of both feet      I counseled  the patient on her conditions, their implications and medical management.  Declines referral to Neurology, continue Gabapentin for symptoms of neuropathy  Recommend supportive tennis or diabetic shoes with a wide/supportive toe box, consider Hoka one one bondii tennis shoe to reduce pressure. May consider OTC metatarsal pad.   Declines rx diabetic shoes, states had poor experience with them in the past.  Shoe inspection. Diabetic Foot Education. Patient reminded of the importance of good nutrition and blood sugar control to help prevent podiatric complications of diabetes. Patient instructed on proper foot hygeine. We discussed wearing proper shoe gear, daily foot inspections, never walking without protective shoe gear, never putting sharp instruments to feet, routine podiatric nail visits    Return to clinic in 1 year, sooner PRN

## 2023-01-23 NOTE — PROGRESS NOTES
Patient, Vivienne Jose (MRN #[id number]), presented with a recorded BMI of 42.88 kg/m^2 consistent with the definition of morbid obesity (ICD-10 E66.01). The patient's morbid obesity was monitored, evaluated, addressed and/or treated. This addendum to the medical record is made on 01/23/2023.

## 2023-01-30 ENCOUNTER — CLINICAL SUPPORT (OUTPATIENT)
Dept: INTERNAL MEDICINE | Facility: CLINIC | Age: 73
End: 2023-01-30
Payer: MEDICARE

## 2023-01-30 DIAGNOSIS — J30.9 CHRONIC ALLERGIC RHINITIS: Primary | ICD-10-CM

## 2023-01-30 PROCEDURE — 95115 IMMUNOTHERAPY ONE INJECTION: CPT | Mod: HCNC,S$GLB,, | Performed by: FAMILY MEDICINE

## 2023-01-30 PROCEDURE — 95115 PR IMMUNOTHERAPY, ONE INJECTION: ICD-10-PCS | Mod: HCNC,S$GLB,, | Performed by: FAMILY MEDICINE

## 2023-01-30 PROCEDURE — 95115 IMMUNOTHERAPY ONE INJECTION: CPT | Mod: HCNC,S$GLB,, | Performed by: ALLERGY & IMMUNOLOGY

## 2023-01-30 PROCEDURE — 95115 PR IMMUNOTHERAPY, ONE INJECTION: ICD-10-PCS | Mod: HCNC,S$GLB,, | Performed by: ALLERGY & IMMUNOLOGY

## 2023-02-02 ENCOUNTER — OFFICE VISIT (OUTPATIENT)
Dept: CARDIOLOGY | Facility: CLINIC | Age: 73
End: 2023-02-02
Payer: MEDICARE

## 2023-02-02 VITALS
OXYGEN SATURATION: 98 % | WEIGHT: 234 LBS | DIASTOLIC BLOOD PRESSURE: 65 MMHG | HEART RATE: 81 BPM | HEIGHT: 62 IN | BODY MASS INDEX: 43.06 KG/M2 | SYSTOLIC BLOOD PRESSURE: 124 MMHG

## 2023-02-02 DIAGNOSIS — Z71.2 ENCOUNTER TO DISCUSS TEST RESULTS: Primary | ICD-10-CM

## 2023-02-02 PROCEDURE — 1160F PR REVIEW ALL MEDS BY PRESCRIBER/CLIN PHARMACIST DOCUMENTED: ICD-10-PCS | Mod: HCNC,CPTII,S$GLB, | Performed by: INTERNAL MEDICINE

## 2023-02-02 PROCEDURE — 99999 PR PBB SHADOW E&M-EST. PATIENT-LVL II: CPT | Mod: PBBFAC,HCNC,, | Performed by: INTERNAL MEDICINE

## 2023-02-02 PROCEDURE — 3288F PR FALLS RISK ASSESSMENT DOCUMENTED: ICD-10-PCS | Mod: HCNC,CPTII,S$GLB, | Performed by: INTERNAL MEDICINE

## 2023-02-02 PROCEDURE — 3288F FALL RISK ASSESSMENT DOCD: CPT | Mod: HCNC,CPTII,S$GLB, | Performed by: INTERNAL MEDICINE

## 2023-02-02 PROCEDURE — 1159F MED LIST DOCD IN RCRD: CPT | Mod: HCNC,CPTII,S$GLB, | Performed by: INTERNAL MEDICINE

## 2023-02-02 PROCEDURE — 1159F PR MEDICATION LIST DOCUMENTED IN MEDICAL RECORD: ICD-10-PCS | Mod: HCNC,CPTII,S$GLB, | Performed by: INTERNAL MEDICINE

## 2023-02-02 PROCEDURE — 1101F PT FALLS ASSESS-DOCD LE1/YR: CPT | Mod: HCNC,CPTII,S$GLB, | Performed by: INTERNAL MEDICINE

## 2023-02-02 PROCEDURE — 99213 PR OFFICE/OUTPT VISIT, EST, LEVL III, 20-29 MIN: ICD-10-PCS | Mod: HCNC,S$GLB,, | Performed by: INTERNAL MEDICINE

## 2023-02-02 PROCEDURE — 3008F BODY MASS INDEX DOCD: CPT | Mod: HCNC,CPTII,S$GLB, | Performed by: INTERNAL MEDICINE

## 2023-02-02 PROCEDURE — 99999 PR PBB SHADOW E&M-EST. PATIENT-LVL II: ICD-10-PCS | Mod: PBBFAC,HCNC,, | Performed by: INTERNAL MEDICINE

## 2023-02-02 PROCEDURE — 1157F ADVNC CARE PLAN IN RCRD: CPT | Mod: HCNC,CPTII,S$GLB, | Performed by: INTERNAL MEDICINE

## 2023-02-02 PROCEDURE — 3072F LOW RISK FOR RETINOPATHY: CPT | Mod: HCNC,CPTII,S$GLB, | Performed by: INTERNAL MEDICINE

## 2023-02-02 PROCEDURE — 3008F PR BODY MASS INDEX (BMI) DOCUMENTED: ICD-10-PCS | Mod: HCNC,CPTII,S$GLB, | Performed by: INTERNAL MEDICINE

## 2023-02-02 PROCEDURE — 3074F SYST BP LT 130 MM HG: CPT | Mod: HCNC,CPTII,S$GLB, | Performed by: INTERNAL MEDICINE

## 2023-02-02 PROCEDURE — 1160F RVW MEDS BY RX/DR IN RCRD: CPT | Mod: HCNC,CPTII,S$GLB, | Performed by: INTERNAL MEDICINE

## 2023-02-02 PROCEDURE — 3078F PR MOST RECENT DIASTOLIC BLOOD PRESSURE < 80 MM HG: ICD-10-PCS | Mod: HCNC,CPTII,S$GLB, | Performed by: INTERNAL MEDICINE

## 2023-02-02 PROCEDURE — 3078F DIAST BP <80 MM HG: CPT | Mod: HCNC,CPTII,S$GLB, | Performed by: INTERNAL MEDICINE

## 2023-02-02 PROCEDURE — 99213 OFFICE O/P EST LOW 20 MIN: CPT | Mod: HCNC,S$GLB,, | Performed by: INTERNAL MEDICINE

## 2023-02-02 PROCEDURE — 1101F PR PT FALLS ASSESS DOC 0-1 FALLS W/OUT INJ PAST YR: ICD-10-PCS | Mod: HCNC,CPTII,S$GLB, | Performed by: INTERNAL MEDICINE

## 2023-02-02 PROCEDURE — 3074F PR MOST RECENT SYSTOLIC BLOOD PRESSURE < 130 MM HG: ICD-10-PCS | Mod: HCNC,CPTII,S$GLB, | Performed by: INTERNAL MEDICINE

## 2023-02-02 PROCEDURE — 1157F PR ADVANCE CARE PLAN OR EQUIV PRESENT IN MEDICAL RECORD: ICD-10-PCS | Mod: HCNC,CPTII,S$GLB, | Performed by: INTERNAL MEDICINE

## 2023-02-02 PROCEDURE — 1126F PR PAIN SEVERITY QUANTIFIED, NO PAIN PRESENT: ICD-10-PCS | Mod: HCNC,CPTII,S$GLB, | Performed by: INTERNAL MEDICINE

## 2023-02-02 PROCEDURE — 3072F PR LOW RISK FOR RETINOPATHY: ICD-10-PCS | Mod: HCNC,CPTII,S$GLB, | Performed by: INTERNAL MEDICINE

## 2023-02-02 PROCEDURE — 1126F AMNT PAIN NOTED NONE PRSNT: CPT | Mod: HCNC,CPTII,S$GLB, | Performed by: INTERNAL MEDICINE

## 2023-02-02 NOTE — PROGRESS NOTES
Message from Dr Christianson received: No. She had an EGD 10/21- no varices. No hx of GI bleeding or other bleeding issues.    Message sent to Ms Jose to update and if agreeable will send prescription

## 2023-02-02 NOTE — PROGRESS NOTES
Vitals:    02/02/23 1341   BP: 124/65   Pulse: 81     Discussed event monitor re afib  H/o compensated cirrhosis  Message sent to Dr Meghan lassiter contra indication to AC (?varices)  If not contra-indication, she would like to consider apixaban 90 day supply through mail delivery  Discussed EP referral (symptomatic). Will await message from Dr Meghan lassiter AC. If temporarily able to (but not long term), can consider Watchman    Has weaned off amlodipine  Lost weight!    Total duration of face to face visit time 22 minutes.  Total time spent counseling greater than fifty percent of total visit time.  Counseling included discussion regarding imaging findings, diagnosis, possibilities, treatment options, risks and benefits.

## 2023-02-09 ENCOUNTER — LAB VISIT (OUTPATIENT)
Dept: LAB | Facility: HOSPITAL | Age: 73
End: 2023-02-09
Attending: INTERNAL MEDICINE
Payer: MEDICARE

## 2023-02-09 DIAGNOSIS — R25.2 MUSCLE CRAMP: ICD-10-CM

## 2023-02-09 DIAGNOSIS — K74.69 OTHER CIRRHOSIS OF LIVER: ICD-10-CM

## 2023-02-09 DIAGNOSIS — Z79.4 TYPE 2 DIABETES MELLITUS WITH STAGE 3A CHRONIC KIDNEY DISEASE, WITH LONG-TERM CURRENT USE OF INSULIN: ICD-10-CM

## 2023-02-09 DIAGNOSIS — Z00.00 ENCOUNTER FOR MEDICARE ANNUAL WELLNESS EXAM: ICD-10-CM

## 2023-02-09 DIAGNOSIS — E03.9 HYPOTHYROIDISM, UNSPECIFIED TYPE: ICD-10-CM

## 2023-02-09 DIAGNOSIS — E11.22 TYPE 2 DIABETES MELLITUS WITH STAGE 3A CHRONIC KIDNEY DISEASE, WITH LONG-TERM CURRENT USE OF INSULIN: ICD-10-CM

## 2023-02-09 DIAGNOSIS — N18.31 TYPE 2 DIABETES MELLITUS WITH STAGE 3A CHRONIC KIDNEY DISEASE, WITH LONG-TERM CURRENT USE OF INSULIN: ICD-10-CM

## 2023-02-09 LAB
AFP SERPL-MCNC: 7.4 NG/ML (ref 0–8.4)
ALBUMIN SERPL BCP-MCNC: 3.4 G/DL (ref 3.5–5.2)
ALBUMIN SERPL BCP-MCNC: 3.4 G/DL (ref 3.5–5.2)
ALP SERPL-CCNC: 86 U/L (ref 55–135)
ALP SERPL-CCNC: 86 U/L (ref 55–135)
ALT SERPL W/O P-5'-P-CCNC: 19 U/L (ref 10–44)
ALT SERPL W/O P-5'-P-CCNC: 19 U/L (ref 10–44)
ANION GAP SERPL CALC-SCNC: 14 MMOL/L (ref 8–16)
ANION GAP SERPL CALC-SCNC: 14 MMOL/L (ref 8–16)
AST SERPL-CCNC: 24 U/L (ref 10–40)
AST SERPL-CCNC: 24 U/L (ref 10–40)
BASOPHILS # BLD AUTO: 0.04 K/UL (ref 0–0.2)
BASOPHILS NFR BLD: 0.5 % (ref 0–1.9)
BILIRUB DIRECT SERPL-MCNC: 0.2 MG/DL (ref 0.1–0.3)
BILIRUB SERPL-MCNC: 0.4 MG/DL (ref 0.1–1)
BILIRUB SERPL-MCNC: 0.4 MG/DL (ref 0.1–1)
BUN SERPL-MCNC: 16 MG/DL (ref 8–23)
BUN SERPL-MCNC: 16 MG/DL (ref 8–23)
CALCIUM SERPL-MCNC: 10.1 MG/DL (ref 8.7–10.5)
CALCIUM SERPL-MCNC: 10.1 MG/DL (ref 8.7–10.5)
CHLORIDE SERPL-SCNC: 102 MMOL/L (ref 95–110)
CHLORIDE SERPL-SCNC: 102 MMOL/L (ref 95–110)
CHOLEST SERPL-MCNC: 136 MG/DL (ref 120–199)
CHOLEST/HDLC SERPL: 2.9 {RATIO} (ref 2–5)
CO2 SERPL-SCNC: 25 MMOL/L (ref 23–29)
CO2 SERPL-SCNC: 25 MMOL/L (ref 23–29)
CREAT SERPL-MCNC: 1 MG/DL (ref 0.5–1.4)
CREAT SERPL-MCNC: 1 MG/DL (ref 0.5–1.4)
DIFFERENTIAL METHOD: ABNORMAL
EOSINOPHIL # BLD AUTO: 0.4 K/UL (ref 0–0.5)
EOSINOPHIL NFR BLD: 4.4 % (ref 0–8)
ERYTHROCYTE [DISTWIDTH] IN BLOOD BY AUTOMATED COUNT: 13.9 % (ref 11.5–14.5)
EST. GFR  (NO RACE VARIABLE): >60 ML/MIN/1.73 M^2
EST. GFR  (NO RACE VARIABLE): >60 ML/MIN/1.73 M^2
ESTIMATED AVG GLUCOSE: 134 MG/DL (ref 68–131)
GLUCOSE SERPL-MCNC: 116 MG/DL (ref 70–110)
GLUCOSE SERPL-MCNC: 116 MG/DL (ref 70–110)
HBA1C MFR BLD: 6.3 % (ref 4–5.6)
HCT VFR BLD AUTO: 42.2 % (ref 37–48.5)
HDLC SERPL-MCNC: 47 MG/DL (ref 40–75)
HDLC SERPL: 34.6 % (ref 20–50)
HGB BLD-MCNC: 13.1 G/DL (ref 12–16)
IMM GRANULOCYTES # BLD AUTO: 0.03 K/UL (ref 0–0.04)
IMM GRANULOCYTES NFR BLD AUTO: 0.4 % (ref 0–0.5)
INR PPP: 1 (ref 0.8–1.2)
LDLC SERPL CALC-MCNC: 66.4 MG/DL (ref 63–159)
LYMPHOCYTES # BLD AUTO: 2.2 K/UL (ref 1–4.8)
LYMPHOCYTES NFR BLD: 26.3 % (ref 18–48)
MAGNESIUM SERPL-MCNC: 1.9 MG/DL (ref 1.6–2.6)
MCH RBC QN AUTO: 32.1 PG (ref 27–31)
MCHC RBC AUTO-ENTMCNC: 31 G/DL (ref 32–36)
MCV RBC AUTO: 103 FL (ref 82–98)
MONOCYTES # BLD AUTO: 0.8 K/UL (ref 0.3–1)
MONOCYTES NFR BLD: 10.3 % (ref 4–15)
NEUTROPHILS # BLD AUTO: 4.8 K/UL (ref 1.8–7.7)
NEUTROPHILS NFR BLD: 58.1 % (ref 38–73)
NONHDLC SERPL-MCNC: 89 MG/DL
NRBC BLD-RTO: 0 /100 WBC
PLATELET # BLD AUTO: 308 K/UL (ref 150–450)
PMV BLD AUTO: 10.1 FL (ref 9.2–12.9)
POTASSIUM SERPL-SCNC: 4.5 MMOL/L (ref 3.5–5.1)
POTASSIUM SERPL-SCNC: 4.5 MMOL/L (ref 3.5–5.1)
PROT SERPL-MCNC: 7.6 G/DL (ref 6–8.4)
PROT SERPL-MCNC: 7.6 G/DL (ref 6–8.4)
PROTHROMBIN TIME: 10.8 SEC (ref 9–12.5)
RBC # BLD AUTO: 4.08 M/UL (ref 4–5.4)
SODIUM SERPL-SCNC: 141 MMOL/L (ref 136–145)
SODIUM SERPL-SCNC: 141 MMOL/L (ref 136–145)
TRIGL SERPL-MCNC: 113 MG/DL (ref 30–150)
TSH SERPL DL<=0.005 MIU/L-ACNC: 1.8 UIU/ML (ref 0.4–4)
WBC # BLD AUTO: 8.18 K/UL (ref 3.9–12.7)

## 2023-02-09 PROCEDURE — 36415 COLL VENOUS BLD VENIPUNCTURE: CPT | Mod: HCNC,PO | Performed by: INTERNAL MEDICINE

## 2023-02-09 PROCEDURE — 85025 COMPLETE CBC W/AUTO DIFF WBC: CPT | Mod: HCNC | Performed by: INTERNAL MEDICINE

## 2023-02-09 PROCEDURE — 83036 HEMOGLOBIN GLYCOSYLATED A1C: CPT | Mod: HCNC | Performed by: INTERNAL MEDICINE

## 2023-02-09 PROCEDURE — 84443 ASSAY THYROID STIM HORMONE: CPT | Mod: HCNC | Performed by: INTERNAL MEDICINE

## 2023-02-09 PROCEDURE — 80053 COMPREHEN METABOLIC PANEL: CPT | Mod: HCNC | Performed by: INTERNAL MEDICINE

## 2023-02-09 PROCEDURE — 83735 ASSAY OF MAGNESIUM: CPT | Mod: HCNC | Performed by: INTERNAL MEDICINE

## 2023-02-09 PROCEDURE — 85610 PROTHROMBIN TIME: CPT | Mod: HCNC | Performed by: INTERNAL MEDICINE

## 2023-02-09 PROCEDURE — 80061 LIPID PANEL: CPT | Mod: HCNC | Performed by: INTERNAL MEDICINE

## 2023-02-09 PROCEDURE — 82105 ALPHA-FETOPROTEIN SERUM: CPT | Mod: HCNC | Performed by: INTERNAL MEDICINE

## 2023-02-09 PROCEDURE — 82248 BILIRUBIN DIRECT: CPT | Mod: HCNC | Performed by: INTERNAL MEDICINE

## 2023-02-20 ENCOUNTER — OFFICE VISIT (OUTPATIENT)
Dept: INTERNAL MEDICINE | Facility: CLINIC | Age: 73
End: 2023-02-20
Payer: MEDICARE

## 2023-02-20 VITALS
BODY MASS INDEX: 44.34 KG/M2 | OXYGEN SATURATION: 98 % | SYSTOLIC BLOOD PRESSURE: 116 MMHG | HEIGHT: 62 IN | DIASTOLIC BLOOD PRESSURE: 60 MMHG | HEART RATE: 86 BPM | WEIGHT: 240.94 LBS

## 2023-02-20 DIAGNOSIS — E66.01 SEVERE OBESITY (BMI >= 40): ICD-10-CM

## 2023-02-20 DIAGNOSIS — M46.1 SACROILIITIS: ICD-10-CM

## 2023-02-20 DIAGNOSIS — I77.9 BILATERAL CAROTID ARTERY DISEASE, UNSPECIFIED TYPE: ICD-10-CM

## 2023-02-20 DIAGNOSIS — K21.9 GASTROESOPHAGEAL REFLUX DISEASE WITHOUT ESOPHAGITIS: ICD-10-CM

## 2023-02-20 DIAGNOSIS — E78.5 HYPERLIPIDEMIA ASSOCIATED WITH TYPE 2 DIABETES MELLITUS: ICD-10-CM

## 2023-02-20 DIAGNOSIS — M54.31 RIGHT SIDED SCIATICA: Primary | ICD-10-CM

## 2023-02-20 DIAGNOSIS — N18.31 TYPE 2 DIABETES MELLITUS WITH STAGE 3A CHRONIC KIDNEY DISEASE, WITH LONG-TERM CURRENT USE OF INSULIN: ICD-10-CM

## 2023-02-20 DIAGNOSIS — I15.2 HYPERTENSION ASSOCIATED WITH DIABETES: ICD-10-CM

## 2023-02-20 DIAGNOSIS — E03.9 HYPOTHYROIDISM, UNSPECIFIED TYPE: ICD-10-CM

## 2023-02-20 DIAGNOSIS — I85.10 SECONDARY ESOPHAGEAL VARICES WITHOUT BLEEDING: ICD-10-CM

## 2023-02-20 DIAGNOSIS — E11.59 HYPERTENSION ASSOCIATED WITH DIABETES: ICD-10-CM

## 2023-02-20 DIAGNOSIS — E11.42 DIABETIC POLYNEUROPATHY ASSOCIATED WITH TYPE 2 DIABETES MELLITUS: ICD-10-CM

## 2023-02-20 DIAGNOSIS — Z79.4 TYPE 2 DIABETES MELLITUS WITH STAGE 3A CHRONIC KIDNEY DISEASE, WITH LONG-TERM CURRENT USE OF INSULIN: ICD-10-CM

## 2023-02-20 DIAGNOSIS — E11.3293 MILD NONPROLIFERATIVE DIABETIC RETINOPATHY OF BOTH EYES WITHOUT MACULAR EDEMA ASSOCIATED WITH TYPE 2 DIABETES MELLITUS: ICD-10-CM

## 2023-02-20 DIAGNOSIS — K74.69 OTHER CIRRHOSIS OF LIVER: ICD-10-CM

## 2023-02-20 DIAGNOSIS — E11.69 HYPERLIPIDEMIA ASSOCIATED WITH TYPE 2 DIABETES MELLITUS: ICD-10-CM

## 2023-02-20 DIAGNOSIS — I48.91 ATRIAL FIBRILLATION, UNSPECIFIED TYPE: ICD-10-CM

## 2023-02-20 DIAGNOSIS — E11.22 TYPE 2 DIABETES MELLITUS WITH STAGE 3A CHRONIC KIDNEY DISEASE, WITH LONG-TERM CURRENT USE OF INSULIN: ICD-10-CM

## 2023-02-20 PROCEDURE — 1159F PR MEDICATION LIST DOCUMENTED IN MEDICAL RECORD: ICD-10-PCS | Mod: HCNC,CPTII,S$GLB, | Performed by: INTERNAL MEDICINE

## 2023-02-20 PROCEDURE — 99215 OFFICE O/P EST HI 40 MIN: CPT | Mod: HCNC,S$GLB,, | Performed by: INTERNAL MEDICINE

## 2023-02-20 PROCEDURE — 1159F MED LIST DOCD IN RCRD: CPT | Mod: HCNC,CPTII,S$GLB, | Performed by: INTERNAL MEDICINE

## 2023-02-20 PROCEDURE — 3078F PR MOST RECENT DIASTOLIC BLOOD PRESSURE < 80 MM HG: ICD-10-PCS | Mod: HCNC,CPTII,S$GLB, | Performed by: INTERNAL MEDICINE

## 2023-02-20 PROCEDURE — 3078F DIAST BP <80 MM HG: CPT | Mod: HCNC,CPTII,S$GLB, | Performed by: INTERNAL MEDICINE

## 2023-02-20 PROCEDURE — 1125F PR PAIN SEVERITY QUANTIFIED, PAIN PRESENT: ICD-10-PCS | Mod: HCNC,CPTII,S$GLB, | Performed by: INTERNAL MEDICINE

## 2023-02-20 PROCEDURE — 3074F PR MOST RECENT SYSTOLIC BLOOD PRESSURE < 130 MM HG: ICD-10-PCS | Mod: HCNC,CPTII,S$GLB, | Performed by: INTERNAL MEDICINE

## 2023-02-20 PROCEDURE — 3072F LOW RISK FOR RETINOPATHY: CPT | Mod: HCNC,CPTII,S$GLB, | Performed by: INTERNAL MEDICINE

## 2023-02-20 PROCEDURE — 3288F PR FALLS RISK ASSESSMENT DOCUMENTED: ICD-10-PCS | Mod: HCNC,CPTII,S$GLB, | Performed by: INTERNAL MEDICINE

## 2023-02-20 PROCEDURE — 3008F PR BODY MASS INDEX (BMI) DOCUMENTED: ICD-10-PCS | Mod: HCNC,CPTII,S$GLB, | Performed by: INTERNAL MEDICINE

## 2023-02-20 PROCEDURE — 1101F PR PT FALLS ASSESS DOC 0-1 FALLS W/OUT INJ PAST YR: ICD-10-PCS | Mod: HCNC,CPTII,S$GLB, | Performed by: INTERNAL MEDICINE

## 2023-02-20 PROCEDURE — 3072F PR LOW RISK FOR RETINOPATHY: ICD-10-PCS | Mod: HCNC,CPTII,S$GLB, | Performed by: INTERNAL MEDICINE

## 2023-02-20 PROCEDURE — 1101F PT FALLS ASSESS-DOCD LE1/YR: CPT | Mod: HCNC,CPTII,S$GLB, | Performed by: INTERNAL MEDICINE

## 2023-02-20 PROCEDURE — 99999 PR PBB SHADOW E&M-EST. PATIENT-LVL V: CPT | Mod: PBBFAC,HCNC,, | Performed by: INTERNAL MEDICINE

## 2023-02-20 PROCEDURE — 99215 PR OFFICE/OUTPT VISIT, EST, LEVL V, 40-54 MIN: ICD-10-PCS | Mod: HCNC,S$GLB,, | Performed by: INTERNAL MEDICINE

## 2023-02-20 PROCEDURE — 1125F AMNT PAIN NOTED PAIN PRSNT: CPT | Mod: HCNC,CPTII,S$GLB, | Performed by: INTERNAL MEDICINE

## 2023-02-20 PROCEDURE — 3074F SYST BP LT 130 MM HG: CPT | Mod: HCNC,CPTII,S$GLB, | Performed by: INTERNAL MEDICINE

## 2023-02-20 PROCEDURE — 1157F PR ADVANCE CARE PLAN OR EQUIV PRESENT IN MEDICAL RECORD: ICD-10-PCS | Mod: HCNC,CPTII,S$GLB, | Performed by: INTERNAL MEDICINE

## 2023-02-20 PROCEDURE — 1160F RVW MEDS BY RX/DR IN RCRD: CPT | Mod: HCNC,CPTII,S$GLB, | Performed by: INTERNAL MEDICINE

## 2023-02-20 PROCEDURE — 3044F PR MOST RECENT HEMOGLOBIN A1C LEVEL <7.0%: ICD-10-PCS | Mod: HCNC,CPTII,S$GLB, | Performed by: INTERNAL MEDICINE

## 2023-02-20 PROCEDURE — 1160F PR REVIEW ALL MEDS BY PRESCRIBER/CLIN PHARMACIST DOCUMENTED: ICD-10-PCS | Mod: HCNC,CPTII,S$GLB, | Performed by: INTERNAL MEDICINE

## 2023-02-20 PROCEDURE — 3008F BODY MASS INDEX DOCD: CPT | Mod: HCNC,CPTII,S$GLB, | Performed by: INTERNAL MEDICINE

## 2023-02-20 PROCEDURE — 99999 PR PBB SHADOW E&M-EST. PATIENT-LVL V: ICD-10-PCS | Mod: PBBFAC,HCNC,, | Performed by: INTERNAL MEDICINE

## 2023-02-20 PROCEDURE — 3044F HG A1C LEVEL LT 7.0%: CPT | Mod: HCNC,CPTII,S$GLB, | Performed by: INTERNAL MEDICINE

## 2023-02-20 PROCEDURE — 1157F ADVNC CARE PLAN IN RCRD: CPT | Mod: HCNC,CPTII,S$GLB, | Performed by: INTERNAL MEDICINE

## 2023-02-20 PROCEDURE — 3288F FALL RISK ASSESSMENT DOCD: CPT | Mod: HCNC,CPTII,S$GLB, | Performed by: INTERNAL MEDICINE

## 2023-02-20 RX ORDER — PREGABALIN 75 MG/1
75 CAPSULE ORAL 2 TIMES DAILY
Qty: 60 CAPSULE | Refills: 6 | Status: SHIPPED | OUTPATIENT
Start: 2023-02-20 | End: 2023-09-13

## 2023-02-20 RX ORDER — OMEPRAZOLE 20 MG/1
20 CAPSULE, DELAYED RELEASE ORAL DAILY PRN
Qty: 90 CAPSULE | Refills: 3 | Status: SHIPPED | OUTPATIENT
Start: 2023-02-20 | End: 2024-03-22

## 2023-02-20 NOTE — PROGRESS NOTES
Patient ID: Vivienne Jose is a 72 y.o. female.    Chief Complaint: Annual Exam    HPI Vivienne is a 72 y.o. female with  liver cirrhosis, hypertension, type 2 diabetes, coronary artery disease, hypothyroidism, sleep apnea, recurrent urinary tract infection and new diagnosis of afib who presents for routine follow-up of medical conditions.  She presents with her  today.  She has no new acute complaints today.      Patient reports that she tried to take herself off of the gabapentin.  She wanted to come off the gabapentin because she felt like it was a factor in weight gain.  Off the gabapentin, she lost 8 lb.  However her sciatic pain returned and was severe.  She has since put herself back on the gabapentin.  Although she is interested in maybe trying Lyrica to see if this may have less of an effect on her weight.      Patient follows with cardiology.  She was recently diagnosed with atrial fibrillation.  She has declined to start Eliquis.  She is concerned about taking this medication.  She is not on any rate or rhythm control at this time.    She follows regularly with hepatology for liver cirrhosis.    She also follows with Urology and Sleep Medicine.    Reviewed lab results from 2/9/23.    Health Maintenance Topics with due status: Not Due       Topic Last Completion Date    TETANUS VACCINE 06/19/2014    DEXA Scan 05/28/2020    Eye Exam 05/18/2022    Diabetes Urine Screening 08/11/2022    Colorectal Cancer Screening 09/09/2022    Mammogram 10/14/2022    Lipid Panel 02/09/2023    Hemoglobin A1c 02/09/2023    Aspirin/Antiplatelet Therapy 02/20/2023        Review of Systems   All other systems reviewed and are negative.      Objective:     Vitals:    02/20/23 1248   BP: 116/60   Pulse: 86        Physical Exam  Vitals reviewed.   Constitutional:       General: She is not in acute distress.     Appearance: Normal appearance. She is well-developed. She is obese. She is not ill-appearing, toxic-appearing or  diaphoretic.   HENT:      Head: Normocephalic and atraumatic.      Right Ear: External ear normal.      Left Ear: External ear normal.      Nose: Nose normal.   Eyes:      General: No scleral icterus.        Right eye: No discharge.         Left eye: No discharge.      Extraocular Movements: Extraocular movements intact.      Conjunctiva/sclera: Conjunctivae normal.   Cardiovascular:      Rate and Rhythm: Normal rate and regular rhythm.      Heart sounds: Normal heart sounds. No murmur heard.    No friction rub. No gallop.   Pulmonary:      Effort: Pulmonary effort is normal. No respiratory distress.      Breath sounds: Normal breath sounds. No stridor. No wheezing, rhonchi or rales.   Skin:     General: Skin is warm and dry.   Neurological:      General: No focal deficit present.      Mental Status: She is alert and oriented to person, place, and time. Mental status is at baseline.   Psychiatric:         Mood and Affect: Mood normal.         Behavior: Behavior normal.         Thought Content: Thought content normal.         Judgment: Judgment normal.       Assessment:       1. Right sided sciatica Chronic   2. Gastroesophageal reflux disease without esophagitis Well controlled   3. Type 2 diabetes mellitus with stage 3a chronic kidney disease, with long-term current use of insulin Well controlled   4. Severe obesity (BMI >= 40) Chronic   5. Mild nonproliferative diabetic retinopathy of 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 edema associated with type 2 diabetes mellitus Chronic   6. Diabetic polyneuropathy associated with type 2 diabetes mellitus Chronic   7. Hypothyroidism, unspecified type Well controlled   8. Other cirrhosis of liver Chronic   9. Secondary esophageal varices without bleeding Inactive   10. Sacroiliitis Chronic   11. Bilateral carotid artery disease, unspecified type Chronic   12. Atrial fibrillation, unspecified type Chronic   13. Hyperlipidemia associated with type 2 diabetes mellitus Well controlled   14.  Hypertension associated with diabetes Well controlled         Plan:         Right sided sciatica  Comments:  Stop gabapentin. Try lyrica instead  Orders:  -     pregabalin (LYRICA) 75 MG capsule; Take 1 capsule (75 mg total) by mouth 2 (two) times daily.  Dispense: 60 capsule; Refill: 6    Gastroesophageal reflux disease without esophagitis  Comments:  continue prilosec prn   Orders:  -     omeprazole (PRILOSEC) 20 MG capsule; Take 1 capsule (20 mg total) by mouth daily as needed (acid reflux).  Dispense: 90 capsule; Refill: 3    Type 2 diabetes mellitus with stage 3a chronic kidney disease, with long-term current use of insulin  Comments:  Cont current medication.   Orders:  -     Comprehensive Metabolic Panel; Future; Expected date: 08/20/2023  -     Hemoglobin A1C; Future; Expected date: 08/20/2023  -     Lipid Panel; Future; Expected date: 08/20/2023    Severe obesity (BMI >= 40)  -     CBC Auto Differential; Future; Expected date: 08/20/2023    Mild nonproliferative diabetic retinopathy of 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 edema associated with type 2 diabetes mellitus    Diabetic polyneuropathy associated with type 2 diabetes mellitus    Hypothyroidism, unspecified type  Comments:  Cont current medication    Other cirrhosis of liver  Comments:  Following with hepatology     Secondary esophageal varices without bleeding  Comments:  No varices seen on most recent EGD    Sacroiliitis    Bilateral carotid artery disease, unspecified type  Comments:  cont aspirin and statin    Atrial fibrillation, unspecified type  Comments:  Currently, she declines anticoagulation. She understands risk of stroke. F/u with cardiology and EP    Hyperlipidemia associated with type 2 diabetes mellitus  Comments:  Cont current medication    Hypertension associated with diabetes  Comments:  Cont current medication        RTC 6 months     Warning signs discussed, patient to call with any further issues or worsening of symptoms.       Parts of  the above note were dictated using a voice dictation software. Please excuse any grammatical or typographical errors.

## 2023-02-23 ENCOUNTER — TELEPHONE (OUTPATIENT)
Dept: INTERNAL MEDICINE | Facility: CLINIC | Age: 73
End: 2023-02-23
Payer: MEDICARE

## 2023-02-23 DIAGNOSIS — I48.91 ATRIAL FIBRILLATION, UNSPECIFIED TYPE: Primary | ICD-10-CM

## 2023-02-23 NOTE — TELEPHONE ENCOUNTER
Called patient to ask about EP appt. Told patient that I would ask Dr. Magallon to put in the referral for the patient to go

## 2023-02-23 NOTE — TELEPHONE ENCOUNTER
----- Message from Aislinn Magallon MD sent at 2/20/2023  2:51 PM CST -----  Please pass along to the patient that it is recommend she be seen in electrophysiology. Thanks    ----- Message -----  From: Hilda Odonnell MD  Sent: 2/20/2023   2:42 PM CST  To: Aislinn Magallon MD    I would recommend keeping the EP appointment as they can emphasize the blood thinner part as well    ----- Message -----  From: Aislinn Magallon MD  Sent: 2/20/2023   1:38 PM CST  To: Hilda Odonnell MD    Hi Just FYI, patient appears to be resistant to eliquis. Can she still see the EP team if she does not take eliquis?    Dr. Magallon

## 2023-02-24 ENCOUNTER — CLINICAL SUPPORT (OUTPATIENT)
Dept: INTERNAL MEDICINE | Facility: CLINIC | Age: 73
End: 2023-02-24
Payer: MEDICARE

## 2023-02-24 DIAGNOSIS — J30.9 CHRONIC ALLERGIC RHINITIS: Primary | ICD-10-CM

## 2023-02-24 PROCEDURE — 95115 IMMUNOTHERAPY ONE INJECTION: CPT | Mod: HCNC,S$GLB,, | Performed by: FAMILY MEDICINE

## 2023-02-24 PROCEDURE — 95115 PR IMMUNOTHERAPY, ONE INJECTION: ICD-10-PCS | Mod: HCNC,S$GLB,, | Performed by: FAMILY MEDICINE

## 2023-02-28 ENCOUNTER — TELEPHONE (OUTPATIENT)
Dept: ELECTROPHYSIOLOGY | Facility: CLINIC | Age: 73
End: 2023-02-28
Payer: MEDICARE

## 2023-02-28 DIAGNOSIS — I49.8 OTHER CARDIAC ARRHYTHMIA: Primary | ICD-10-CM

## 2023-02-28 NOTE — TELEPHONE ENCOUNTER
Called pt to ask NP questions prior to appointment with Dr. Anton. Pt has no device and no outside records. Pt verbally confirmed appointment date, time, and location, and thanked me for calling.

## 2023-03-01 ENCOUNTER — TELEPHONE (OUTPATIENT)
Dept: ENDOSCOPY | Facility: HOSPITAL | Age: 73
End: 2023-03-01
Payer: MEDICARE

## 2023-03-01 ENCOUNTER — OFFICE VISIT (OUTPATIENT)
Dept: HEPATOLOGY | Facility: CLINIC | Age: 73
End: 2023-03-01
Payer: MEDICARE

## 2023-03-01 VITALS
HEIGHT: 62 IN | BODY MASS INDEX: 44.16 KG/M2 | WEIGHT: 240 LBS | TEMPERATURE: 98 F | OXYGEN SATURATION: 99 % | DIASTOLIC BLOOD PRESSURE: 53 MMHG | SYSTOLIC BLOOD PRESSURE: 104 MMHG | HEART RATE: 72 BPM

## 2023-03-01 VITALS — WEIGHT: 240 LBS | HEIGHT: 62 IN | BODY MASS INDEX: 44.16 KG/M2

## 2023-03-01 DIAGNOSIS — R60.0 EDEMA OF BOTH FEET: ICD-10-CM

## 2023-03-01 DIAGNOSIS — K75.81 NASH (NONALCOHOLIC STEATOHEPATITIS): Primary | ICD-10-CM

## 2023-03-01 DIAGNOSIS — K74.69 OTHER CIRRHOSIS OF LIVER: ICD-10-CM

## 2023-03-01 DIAGNOSIS — K74.69 OTHER CIRRHOSIS OF LIVER: Primary | ICD-10-CM

## 2023-03-01 PROCEDURE — 99214 PR OFFICE/OUTPT VISIT, EST, LEVL IV, 30-39 MIN: ICD-10-PCS | Mod: HCNC,S$GLB,, | Performed by: INTERNAL MEDICINE

## 2023-03-01 PROCEDURE — 3008F BODY MASS INDEX DOCD: CPT | Mod: HCNC,CPTII,S$GLB, | Performed by: INTERNAL MEDICINE

## 2023-03-01 PROCEDURE — 3074F SYST BP LT 130 MM HG: CPT | Mod: HCNC,CPTII,S$GLB, | Performed by: INTERNAL MEDICINE

## 2023-03-01 PROCEDURE — 1101F PT FALLS ASSESS-DOCD LE1/YR: CPT | Mod: HCNC,CPTII,S$GLB, | Performed by: INTERNAL MEDICINE

## 2023-03-01 PROCEDURE — 99999 PR PBB SHADOW E&M-EST. PATIENT-LVL IV: CPT | Mod: PBBFAC,HCNC,, | Performed by: INTERNAL MEDICINE

## 2023-03-01 PROCEDURE — 3078F DIAST BP <80 MM HG: CPT | Mod: HCNC,CPTII,S$GLB, | Performed by: INTERNAL MEDICINE

## 2023-03-01 PROCEDURE — 3078F PR MOST RECENT DIASTOLIC BLOOD PRESSURE < 80 MM HG: ICD-10-PCS | Mod: HCNC,CPTII,S$GLB, | Performed by: INTERNAL MEDICINE

## 2023-03-01 PROCEDURE — 1101F PR PT FALLS ASSESS DOC 0-1 FALLS W/OUT INJ PAST YR: ICD-10-PCS | Mod: HCNC,CPTII,S$GLB, | Performed by: INTERNAL MEDICINE

## 2023-03-01 PROCEDURE — 3072F PR LOW RISK FOR RETINOPATHY: ICD-10-PCS | Mod: HCNC,CPTII,S$GLB, | Performed by: INTERNAL MEDICINE

## 2023-03-01 PROCEDURE — 3008F PR BODY MASS INDEX (BMI) DOCUMENTED: ICD-10-PCS | Mod: HCNC,CPTII,S$GLB, | Performed by: INTERNAL MEDICINE

## 2023-03-01 PROCEDURE — 99214 OFFICE O/P EST MOD 30 MIN: CPT | Mod: HCNC,S$GLB,, | Performed by: INTERNAL MEDICINE

## 2023-03-01 PROCEDURE — 3044F HG A1C LEVEL LT 7.0%: CPT | Mod: HCNC,CPTII,S$GLB, | Performed by: INTERNAL MEDICINE

## 2023-03-01 PROCEDURE — 1157F ADVNC CARE PLAN IN RCRD: CPT | Mod: HCNC,CPTII,S$GLB, | Performed by: INTERNAL MEDICINE

## 2023-03-01 PROCEDURE — 1125F PR PAIN SEVERITY QUANTIFIED, PAIN PRESENT: ICD-10-PCS | Mod: HCNC,CPTII,S$GLB, | Performed by: INTERNAL MEDICINE

## 2023-03-01 PROCEDURE — 3288F PR FALLS RISK ASSESSMENT DOCUMENTED: ICD-10-PCS | Mod: HCNC,CPTII,S$GLB, | Performed by: INTERNAL MEDICINE

## 2023-03-01 PROCEDURE — 99999 PR PBB SHADOW E&M-EST. PATIENT-LVL IV: ICD-10-PCS | Mod: PBBFAC,HCNC,, | Performed by: INTERNAL MEDICINE

## 2023-03-01 PROCEDURE — 1157F PR ADVANCE CARE PLAN OR EQUIV PRESENT IN MEDICAL RECORD: ICD-10-PCS | Mod: HCNC,CPTII,S$GLB, | Performed by: INTERNAL MEDICINE

## 2023-03-01 PROCEDURE — 1125F AMNT PAIN NOTED PAIN PRSNT: CPT | Mod: HCNC,CPTII,S$GLB, | Performed by: INTERNAL MEDICINE

## 2023-03-01 PROCEDURE — 3074F PR MOST RECENT SYSTOLIC BLOOD PRESSURE < 130 MM HG: ICD-10-PCS | Mod: HCNC,CPTII,S$GLB, | Performed by: INTERNAL MEDICINE

## 2023-03-01 PROCEDURE — 3288F FALL RISK ASSESSMENT DOCD: CPT | Mod: HCNC,CPTII,S$GLB, | Performed by: INTERNAL MEDICINE

## 2023-03-01 PROCEDURE — 3072F LOW RISK FOR RETINOPATHY: CPT | Mod: HCNC,CPTII,S$GLB, | Performed by: INTERNAL MEDICINE

## 2023-03-01 PROCEDURE — 3044F PR MOST RECENT HEMOGLOBIN A1C LEVEL <7.0%: ICD-10-PCS | Mod: HCNC,CPTII,S$GLB, | Performed by: INTERNAL MEDICINE

## 2023-03-01 NOTE — PLAN OF CARE
Endoscopy procedure scheduled on 3/10/23, prep instructions reviewed, Pt verbalized understanding. Patient instructed on how to locate prep instructions within the portal.

## 2023-03-01 NOTE — PROGRESS NOTES
HEPATOLOGY FOLLOW UP    Referring Physician: Aislinn Magallon MD  Current Corresponding Physician: Aislinn Magallon MD, Hilda Odonnell MD    Vivienne Jose is here for follow up of compensated cirrhosis.    HPI  She is a 72 y.o. female with a PMHx of DM, HTN, CAD, hyperlipidemia, recurrent UTIs and obesity who had a renal stone CT and abdominal US (both 11/20) and was found to have a nodular liver c/w cirrhosis but no HCC. The patient does not drink alcohol heavily; there is no family history of chronic liver disease. HCV AB was negative in 2017. I saw her in consultation 11/25/20.    Interval HIstory  Since Vivienne Jose's last few visits:    --new a fib; eliquis recommended; ablation also recommended; plts are 308- eliquis ok from that perspective; EGD pending    Edema, ongoing: well controlled on lasix 20 mg and aldactone 50 mg daily.       Serologic w/u for CLD:  HCV Ab-, HBsAg-, HBcAb-, HBsAb-, HAV IgG+  CHRISTINA+ (1:1280), ASMA-,   Ferritin 38, iron sat 15%  Peth negative  Alpha 1 antitrypsin level 123    Labs 2/9/23: ALT 19, AST 24, ALKP 86, Tbil 0.4, albumin 3.4    Abdomen US 12/1/22: fatty liver and no cancer  EGD: 10/21: no EV; repeat in 2023    MELD-Na score: 6 at 2/9/2023  8:29 AM  MELD score: 6 at 2/9/2023  8:29 AM  Calculated from:  Serum Creatinine: 1.0 mg/dL at 2/9/2023  8:29 AM  Serum Sodium: 141 mmol/L (Using max of 137 mmol/L) at 2/9/2023  8:29 AM  Total Bilirubin: 0.4 mg/dL (Using min of 1 mg/dL) at 2/9/2023  8:29 AM  INR(ratio): 1.0 at 2/9/2023  8:29 AM  Age: 72 years    Outpatient Encounter Medications as of 3/1/2023   Medication Sig Dispense Refill    ascorbic acid, vitamin C, (VITAMIN C) 100 MG tablet Take 500 mg by mouth 2 (two) times daily.       aspirin (ECOTRIN) 81 MG EC tablet Take 81 mg by mouth once daily.      azelastine (ASTELIN) 137 mcg (0.1 %) nasal spray 2 sprays (274 mcg total) by Nasal route 2 (two) times daily. 120 mL 4    blood sugar diagnostic (TRUE METRIX GLUCOSE TEST STRIP)  Strp TEST TWO TIMES DAILY 200 strip 6    blood-glucose meter Misc [employer] True Metrix Air meter 1 each 0    calcium carbonate-vitamin D3 600 mg(1,500mg) -100 unit Cap Take 1 tablet by mouth once daily.      cyclobenzaprine (FLEXERIL) 5 MG tablet Take 1 tablet (5 mg total) by mouth 2 (two) times daily as needed for Muscle spasms. May cause sedation. 60 tablet 0    estradioL (ESTRING) 2 mg (7.5 mcg /24 hour) vaginal ring Remove old Estring, place the new one every 3 months. 1 each 3    fluticasone propionate (FLONASE) 50 mcg/actuation nasal spray 2 sprays (100 mcg total) by Each Nostril route once daily. 48 g 4    furosemide (LASIX) 20 MG tablet Take 1 tablet (20 mg total) by mouth once daily. 30 tablet 11    glipiZIDE (GLUCOTROL) 5 MG tablet TAKE 1 TABLET TWICE DAILY BEFORE A MEAL 180 tablet 3    insulin degludec (TRESIBA FLEXTOUCH U-100) 100 unit/mL (3 mL) insulin pen INJECT 38 UNITS INTO THE SKIN EVERY EVENING. 10 pen 2    ketotifen (ZADITOR) 0.025 % (0.035 %) ophthalmic solution Place 1-2 drops into both eyes once daily.      L.acid-B.bifidum-B.animal-FOS 25 billion cell -100 mg Cap Take 1 capsule by mouth once daily.      lancets (TRUEPLUS LANCETS) 28 gauge [facility] Inject 1 lancet into the skin 2 (two) times daily before meals. 200 each 6    levothyroxine (SYNTHROID) 75 MCG tablet TAKE 1 TABLET EVERY DAY 90 tablet 0    loratadine (CLARITIN) 10 mg tablet Take 10 mg by mouth once daily.      lovastatin (MEVACOR) 40 MG tablet TAKE 1 TABLET NIGHTLY (SUBSTITUTED FOR MEVACOR) 90 tablet 0    magnesium oxide-Mg AA chelate (MG-PLUS-PROTEIN) 133 mg Tab Take by mouth as directed.      metFORMIN (GLUCOPHAGE) 1000 MG tablet TAKE 1 TABLET TWICE DAILY WITH MEALS 180 tablet 0    mv-mn/folic acid/vit [id number] (ALIVE ONCE DAILY WOMEN 50 PLUS ORAL) Take 1 tablet by mouth once daily.      omeprazole (PRILOSEC) 20 MG capsule Take 1 capsule (20 mg total) by mouth daily as needed (acid reflux). 90 capsule 3    pen needle, diabetic (BD  "ULTRA-FINE EDUARDO PEN NEEDLE) 32 gauge x 5/32" Ndle USE AS DIRECTED 200 each 6    pregabalin (LYRICA) 75 MG capsule Take 1 capsule (75 mg total) by mouth 2 (two) times daily. 60 capsule 6    semaglutide (OZEMPIC) 0.25 mg or 0.5 mg(2 mg/1.5 mL) pen injector Inject 0.5 mg into the skin every 7 days. 3 pen 4    spironolactone (ALDACTONE) 50 MG tablet Take 1 tablet (50 mg total) by mouth once daily. 30 tablet 11    traMADoL (ULTRAM) 50 mg tablet Take 1 tablet (50 mg total) by mouth every 8 (eight) hours as needed for Pain. 21 tablet 0    valACYclovir (VALTREX) 500 MG tablet TAKE 1 TABLET EVERY DAY 90 tablet 3    valsartan (DIOVAN) 160 MG tablet TAKE 1 TABLET TWICE DAILY (Patient taking differently: Take by mouth once daily.) 180 tablet 2    [DISCONTINUED] amLODIPine (NORVASC) 5 MG tablet Take 1 tablet (5 mg total) by mouth every evening. (Patient not taking: Reported on 2/2/2023) 90 tablet 1    [DISCONTINUED] gabapentin (NEURONTIN) 600 MG tablet TAKE 1 TABLET TWICE DAILY 180 tablet 0    [DISCONTINUED] omeprazole (PRILOSEC) 20 MG capsule TAKE 1 CAPSULE EVERY DAY AS NEEDED 90 capsule 0     No facility-administered encounter medications on file as of 3/1/2023.     Review of patient's allergies indicates:   Allergen Reactions    Sulfa (sulfonamide antibiotics) Nausea Only    Ciprofloxacin     Januvia [sitagliptin]      abd pain    Lisinopril     Lotensin [benazepril]     Nexium [esomeprazole magnesium] Other (See Comments)     Gas     Past Medical History:   Diagnosis Date    Allergy     Angio-edema     Arthritis     Cataract     bilateral - not removed    Cerebrovascular malformation     Cirrhosis 11/24/2020    Colon polyps     Coronary artery disease     Diabetes mellitus, type 2     Diabetic peripheral neuropathy     Edema of both feet 8/19/2022    GERD (gastroesophageal reflux disease)     Herpes infection     Hyperlipidemia     Hypertension     Hypothyroidism     Kidney stones     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    HENT: Negative.     Eyes: Negative.    Respiratory: Negative.     Cardiovascular: Negative.    Gastrointestinal: Negative.    Genitourinary: Negative.    Musculoskeletal: Negative.    Skin: Negative.    Neurological: Negative.    Psychiatric/Behavioral: Negative.     Vitals:    03/01/23 1023   BP: (!) 104/53   Pulse: 72   Temp: 97.9 °F (36.6 °C)         Physical Exam  Vitals reviewed.   Constitutional:       Appearance: She is well-developed.   HENT:      Head: Normocephalic and atraumatic.   Eyes:      General: No scleral icterus.     Conjunctiva/sclera: Conjunctivae normal.      Pupils: Pupils are equal, round, and reactive to light.   Neck:      Thyroid: No thyromegaly.   Cardiovascular:      Rate and Rhythm: Normal rate and regular rhythm.      Heart sounds: Normal heart sounds.   Pulmonary:      Effort: Pulmonary effort is normal.      Breath sounds: Normal breath sounds. No rales.   Abdominal:      General: Bowel sounds are normal. There is no distension.      Palpations: Abdomen is soft. There is no mass.      Tenderness: There is no abdominal tenderness.   Musculoskeletal:         General: Swelling present. Normal range of motion.      Cervical back: Normal range of motion and neck supple.   Skin:     General: Skin is warm and dry.      Findings: No rash.   Neurological:      Mental Status: She is alert and oriented to person, place, and time.       MELD-Na score: 6 at 2/9/2023  8:29 AM  MELD score: 6 at 2/9/2023  8:29 AM  Calculated from:  Serum Creatinine: 1.0 mg/dL at  2/9/2023  8:29 AM  Serum Sodium: 141 mmol/L (Using max of 137 mmol/L) at 2/9/2023  8:29 AM  Total Bilirubin: 0.4 mg/dL (Using min of 1 mg/dL) at 2/9/2023  8:29 AM  INR(ratio): 1.0 at 2/9/2023  8:29 AM  Age: 72 years    Lab Results   Component Value Date     (H) 02/09/2023     (H) 02/09/2023    BUN 16 02/09/2023    BUN 16 02/09/2023    CREATININE 1.0 02/09/2023    CREATININE 1.0 02/09/2023    CALCIUM 10.1 02/09/2023    CALCIUM 10.1 02/09/2023     02/09/2023     02/09/2023    K 4.5 02/09/2023    K 4.5 02/09/2023     02/09/2023     02/09/2023    PROT 7.6 02/09/2023    PROT 7.6 02/09/2023    CO2 25 02/09/2023    CO2 25 02/09/2023    ANIONGAP 14 02/09/2023    ANIONGAP 14 02/09/2023    WBC 8.18 02/09/2023    RBC 4.08 02/09/2023    HGB 13.1 02/09/2023    HCT 42.2 02/09/2023     (H) 02/09/2023    MCH 32.1 (H) 02/09/2023    MCHC 31.0 (L) 02/09/2023     Lab Results   Component Value Date    RDW 13.9 02/09/2023     02/09/2023    MPV 10.1 02/09/2023    GRAN 4.8 02/09/2023    GRAN 58.1 02/09/2023    LYMPH 2.2 02/09/2023    LYMPH 26.3 02/09/2023    MONO 0.8 02/09/2023    MONO 10.3 02/09/2023    EOSINOPHIL 4.4 02/09/2023    BASOPHIL 0.5 02/09/2023    EOS 0.4 02/09/2023    BASO 0.04 02/09/2023    APTT 25.6 07/27/2011    GROUPTRH O POS 11/29/2007    CHOL 136 02/09/2023    TRIG 113 02/09/2023    HDL 47 02/09/2023    CHOLHDL 34.6 02/09/2023    TOTALCHOLEST 2.9 02/09/2023    ALBUMIN 3.4 (L) 02/09/2023    ALBUMIN 3.4 (L) 02/09/2023    BILIDIR 0.2 02/09/2023    AST 24 02/09/2023    AST 24 02/09/2023    ALT 19 02/09/2023    ALT 19 02/09/2023    ALKPHOS 86 02/09/2023    ALKPHOS 86 02/09/2023    MG 1.9 02/09/2023    LABPROT 10.8 02/09/2023    INR 1.0 02/09/2023       Assessment and Plan:  Vivienne Jose is a 72 y.o. female with compensated cirrhosis. Current recommendations:  1. Cirrhosis, decompensated: continue diuretics; labs every 12 weeks; HCC screening in 6 months (06/23); EGD to  screen for varices now to r/o varices since may start eliquies for a fib  2. Recurrent UTIs: monitor  3. Edema: diuretics as above  4. Af fib: f/u cardiology; consider abalation/eliquis; avoid amiodarone  5. Portal HTN: EGD now    Return 3-4 months

## 2023-03-06 ENCOUNTER — HOSPITAL ENCOUNTER (OUTPATIENT)
Dept: CARDIOLOGY | Facility: CLINIC | Age: 73
Discharge: HOME | End: 2023-03-06
Payer: MEDICARE

## 2023-03-06 ENCOUNTER — OFFICE VISIT (OUTPATIENT)
Dept: ELECTROPHYSIOLOGY | Facility: CLINIC | Age: 73
End: 2023-03-06
Payer: MEDICARE

## 2023-03-06 VITALS
WEIGHT: 238.13 LBS | BODY MASS INDEX: 43.82 KG/M2 | SYSTOLIC BLOOD PRESSURE: 125 MMHG | HEART RATE: 88 BPM | HEIGHT: 62 IN | DIASTOLIC BLOOD PRESSURE: 60 MMHG

## 2023-03-06 DIAGNOSIS — I49.9 CARDIAC ARRHYTHMIA, UNSPECIFIED CARDIAC ARRHYTHMIA TYPE: ICD-10-CM

## 2023-03-06 DIAGNOSIS — I10 ESSENTIAL HYPERTENSION: Primary | ICD-10-CM

## 2023-03-06 DIAGNOSIS — I49.8 OTHER CARDIAC ARRHYTHMIA: ICD-10-CM

## 2023-03-06 DIAGNOSIS — E78.2 MIXED HYPERLIPIDEMIA: ICD-10-CM

## 2023-03-06 DIAGNOSIS — I48.91 ATRIAL FIBRILLATION, UNSPECIFIED TYPE: ICD-10-CM

## 2023-03-06 PROCEDURE — 93010 RHYTHM STRIP: ICD-10-PCS | Mod: HCNC,S$GLB,, | Performed by: INTERNAL MEDICINE

## 2023-03-06 PROCEDURE — 93005 RHYTHM STRIP: ICD-10-PCS | Mod: HCNC,S$GLB,, | Performed by: INTERNAL MEDICINE

## 2023-03-06 PROCEDURE — 99999 PR PBB SHADOW E&M-EST. PATIENT-LVL III: ICD-10-PCS | Mod: PBBFAC,HCNC,, | Performed by: INTERNAL MEDICINE

## 2023-03-06 PROCEDURE — 93005 ELECTROCARDIOGRAM TRACING: CPT | Mod: HCNC,S$GLB,, | Performed by: INTERNAL MEDICINE

## 2023-03-06 PROCEDURE — 99205 PR OFFICE/OUTPT VISIT, NEW, LEVL V, 60-74 MIN: ICD-10-PCS | Mod: HCNC,S$GLB,, | Performed by: INTERNAL MEDICINE

## 2023-03-06 PROCEDURE — 3044F PR MOST RECENT HEMOGLOBIN A1C LEVEL <7.0%: ICD-10-PCS | Mod: HCNC,CPTII,S$GLB, | Performed by: INTERNAL MEDICINE

## 2023-03-06 PROCEDURE — 3044F HG A1C LEVEL LT 7.0%: CPT | Mod: HCNC,CPTII,S$GLB, | Performed by: INTERNAL MEDICINE

## 2023-03-06 PROCEDURE — 1157F PR ADVANCE CARE PLAN OR EQUIV PRESENT IN MEDICAL RECORD: ICD-10-PCS | Mod: HCNC,CPTII,S$GLB, | Performed by: INTERNAL MEDICINE

## 2023-03-06 PROCEDURE — 3074F SYST BP LT 130 MM HG: CPT | Mod: HCNC,CPTII,S$GLB, | Performed by: INTERNAL MEDICINE

## 2023-03-06 PROCEDURE — 3078F DIAST BP <80 MM HG: CPT | Mod: HCNC,CPTII,S$GLB, | Performed by: INTERNAL MEDICINE

## 2023-03-06 PROCEDURE — 3072F LOW RISK FOR RETINOPATHY: CPT | Mod: HCNC,CPTII,S$GLB, | Performed by: INTERNAL MEDICINE

## 2023-03-06 PROCEDURE — 1101F PR PT FALLS ASSESS DOC 0-1 FALLS W/OUT INJ PAST YR: ICD-10-PCS | Mod: HCNC,CPTII,S$GLB, | Performed by: INTERNAL MEDICINE

## 2023-03-06 PROCEDURE — 99999 PR PBB SHADOW E&M-EST. PATIENT-LVL III: CPT | Mod: PBBFAC,HCNC,, | Performed by: INTERNAL MEDICINE

## 2023-03-06 PROCEDURE — 99205 OFFICE O/P NEW HI 60 MIN: CPT | Mod: HCNC,S$GLB,, | Performed by: INTERNAL MEDICINE

## 2023-03-06 PROCEDURE — 3008F BODY MASS INDEX DOCD: CPT | Mod: HCNC,CPTII,S$GLB, | Performed by: INTERNAL MEDICINE

## 2023-03-06 PROCEDURE — 3008F PR BODY MASS INDEX (BMI) DOCUMENTED: ICD-10-PCS | Mod: HCNC,CPTII,S$GLB, | Performed by: INTERNAL MEDICINE

## 2023-03-06 PROCEDURE — 1157F ADVNC CARE PLAN IN RCRD: CPT | Mod: HCNC,CPTII,S$GLB, | Performed by: INTERNAL MEDICINE

## 2023-03-06 PROCEDURE — 1125F PR PAIN SEVERITY QUANTIFIED, PAIN PRESENT: ICD-10-PCS | Mod: HCNC,CPTII,S$GLB, | Performed by: INTERNAL MEDICINE

## 2023-03-06 PROCEDURE — 93010 ELECTROCARDIOGRAM REPORT: CPT | Mod: HCNC,S$GLB,, | Performed by: INTERNAL MEDICINE

## 2023-03-06 PROCEDURE — 3288F FALL RISK ASSESSMENT DOCD: CPT | Mod: HCNC,CPTII,S$GLB, | Performed by: INTERNAL MEDICINE

## 2023-03-06 PROCEDURE — 3078F PR MOST RECENT DIASTOLIC BLOOD PRESSURE < 80 MM HG: ICD-10-PCS | Mod: HCNC,CPTII,S$GLB, | Performed by: INTERNAL MEDICINE

## 2023-03-06 PROCEDURE — 1159F PR MEDICATION LIST DOCUMENTED IN MEDICAL RECORD: ICD-10-PCS | Mod: HCNC,CPTII,S$GLB, | Performed by: INTERNAL MEDICINE

## 2023-03-06 PROCEDURE — 1125F AMNT PAIN NOTED PAIN PRSNT: CPT | Mod: HCNC,CPTII,S$GLB, | Performed by: INTERNAL MEDICINE

## 2023-03-06 PROCEDURE — 3288F PR FALLS RISK ASSESSMENT DOCUMENTED: ICD-10-PCS | Mod: HCNC,CPTII,S$GLB, | Performed by: INTERNAL MEDICINE

## 2023-03-06 PROCEDURE — 1159F MED LIST DOCD IN RCRD: CPT | Mod: HCNC,CPTII,S$GLB, | Performed by: INTERNAL MEDICINE

## 2023-03-06 PROCEDURE — 1101F PT FALLS ASSESS-DOCD LE1/YR: CPT | Mod: HCNC,CPTII,S$GLB, | Performed by: INTERNAL MEDICINE

## 2023-03-06 PROCEDURE — 3072F PR LOW RISK FOR RETINOPATHY: ICD-10-PCS | Mod: HCNC,CPTII,S$GLB, | Performed by: INTERNAL MEDICINE

## 2023-03-06 PROCEDURE — 3074F PR MOST RECENT SYSTOLIC BLOOD PRESSURE < 130 MM HG: ICD-10-PCS | Mod: HCNC,CPTII,S$GLB, | Performed by: INTERNAL MEDICINE

## 2023-03-06 NOTE — PROGRESS NOTES
Subjective:     HPI    I had the pleasure of seeing Vivienne Jose in consultation at your request for the evaluation of AF. She is a 72F with cirrhosis, hypertension, type 2 diabetes, nonobstructive coronary artery disease, hypothyroidism, sleep apnea, recurrent urinary tract infection, who recently has experienced palpitations associated with fullness in chest and lightheadedness. This prompted an event monitor, which showed paroxysms of rate controlled AF (12/15/2022 at 850 AM, 12/16/2022 at 742 AM, unknown duration). She was prescribed eliquis but has not filled it yet.    Echo in 12/2022 showed EF 60%. Cardiac PET in 2017 negative for ischemia.    I reviewed event monitor strips. 12/16 strips clearly show sinus bradycardia, while 12/15 strips are indeterminate (sinus bradycardia with diminutive P-waves vs AF with regular, junctional escape.     My interpretation of today's ECG is sinus rhythm at 88 bpm with RBBB.    Review of Systems   Constitutional: Negative for decreased appetite, malaise/fatigue, weight gain and weight loss.   HENT:  Negative for sore throat.    Eyes:  Negative for blurred vision.   Cardiovascular:  Positive for palpitations. Negative for chest pain, dyspnea on exertion, irregular heartbeat, leg swelling, near-syncope, orthopnea, paroxysmal nocturnal dyspnea and syncope.   Respiratory:  Negative for shortness of breath.    Skin:  Negative for rash.   Musculoskeletal:  Negative for arthritis.   Gastrointestinal:  Negative for abdominal pain.   Neurological:  Positive for light-headedness. Negative for focal weakness.   Psychiatric/Behavioral:  Negative for altered mental status.       Objective:    Physical Exam  Constitutional:       General: She is not in acute distress.     Appearance: She is well-developed.   HENT:      Head: Normocephalic and atraumatic.   Eyes:      General: No scleral icterus.     Conjunctiva/sclera: Conjunctivae normal.      Pupils: Pupils are equal, round, and  reactive to light.   Neck:      Thyroid: No thyromegaly.      Vascular: No JVD.   Cardiovascular:      Rate and Rhythm: Normal rate and regular rhythm.      Pulses: Intact distal pulses.      Heart sounds: Normal heart sounds. No murmur heard.    No friction rub. No gallop.   Pulmonary:      Effort: Pulmonary effort is normal. No respiratory distress.      Breath sounds: Normal breath sounds.   Abdominal:      General: Bowel sounds are normal. There is no distension.      Palpations: Abdomen is soft.   Musculoskeletal:      Cervical back: Normal range of motion and neck supple.   Skin:     General: Skin is warm and dry.   Neurological:      Mental Status: She is alert and oriented to person, place, and time.   Psychiatric:         Behavior: Behavior normal.         Assessment:       1. Essential hypertension    2. Mixed hyperlipidemia    3. Cardiac arrhythmia, unspecified cardiac arrhythmia type         Plan:       In summary, Vivienne Jose is a 72F with a recent event monitor read as AF, but very likely most consistent with sinus rhythm and junctional rhythm.    Plan is for 2 week Zio monitor to more clearly elucidate diagnosis. Will call pt with results of this monitor. Provided no AF is seen she will see me PRN.    Thank you for allowing me to participate in the care of this patient. Please do not hesitate to call me with any questions or concerns.

## 2023-03-07 ENCOUNTER — CLINICAL SUPPORT (OUTPATIENT)
Dept: CARDIOLOGY | Facility: HOSPITAL | Age: 73
End: 2023-03-07
Attending: INTERNAL MEDICINE
Payer: MEDICARE

## 2023-03-07 DIAGNOSIS — I48.91 ATRIAL FIBRILLATION, UNSPECIFIED TYPE: ICD-10-CM

## 2023-03-07 DIAGNOSIS — I10 ESSENTIAL HYPERTENSION: ICD-10-CM

## 2023-03-07 PROCEDURE — 93248 CV CARDIAC MONITOR - 3-15 DAY ADULT (CUPID ONLY): ICD-10-PCS | Mod: HCNC,,, | Performed by: INTERNAL MEDICINE

## 2023-03-07 PROCEDURE — 93248 EXT ECG>7D<15D REV&INTERPJ: CPT | Mod: HCNC,,, | Performed by: INTERNAL MEDICINE

## 2023-03-10 ENCOUNTER — ANESTHESIA EVENT (OUTPATIENT)
Dept: ENDOSCOPY | Facility: HOSPITAL | Age: 73
End: 2023-03-10
Payer: MEDICARE

## 2023-03-10 ENCOUNTER — HOSPITAL ENCOUNTER (OUTPATIENT)
Facility: HOSPITAL | Age: 73
Discharge: HOME | End: 2023-03-10
Attending: INTERNAL MEDICINE | Admitting: INTERNAL MEDICINE
Payer: MEDICARE

## 2023-03-10 ENCOUNTER — ANESTHESIA (OUTPATIENT)
Dept: ENDOSCOPY | Facility: HOSPITAL | Age: 73
End: 2023-03-10
Payer: MEDICARE

## 2023-03-10 VITALS
OXYGEN SATURATION: 97 % | DIASTOLIC BLOOD PRESSURE: 57 MMHG | RESPIRATION RATE: 16 BRPM | BODY MASS INDEX: 43.79 KG/M2 | HEIGHT: 62 IN | SYSTOLIC BLOOD PRESSURE: 122 MMHG | WEIGHT: 238 LBS | HEART RATE: 70 BPM | TEMPERATURE: 98 F

## 2023-03-10 DIAGNOSIS — K74.60 HEPATIC CIRRHOSIS, UNSPECIFIED HEPATIC CIRRHOSIS TYPE, UNSPECIFIED WHETHER ASCITES PRESENT: Primary | ICD-10-CM

## 2023-03-10 DIAGNOSIS — K74.60 CIRRHOSIS: ICD-10-CM

## 2023-03-10 LAB
POCT GLUCOSE: 81 MG/DL (ref 70–110)
POCT GLUCOSE: 93 MG/DL (ref 70–110)

## 2023-03-10 PROCEDURE — 25000003 PHARM REV CODE 250: Mod: HCNC | Performed by: INTERNAL MEDICINE

## 2023-03-10 PROCEDURE — 37000008 HC ANESTHESIA 1ST 15 MINUTES: Mod: HCNC | Performed by: INTERNAL MEDICINE

## 2023-03-10 PROCEDURE — 43235 PR EGD, FLEX, DIAGNOSTIC: ICD-10-PCS | Mod: HCNC,,, | Performed by: INTERNAL MEDICINE

## 2023-03-10 PROCEDURE — 63600175 PHARM REV CODE 636 W HCPCS: Mod: HCNC | Performed by: NURSE ANESTHETIST, CERTIFIED REGISTERED

## 2023-03-10 PROCEDURE — D9220A PRA ANESTHESIA: Mod: HCNC,ANES,, | Performed by: ANESTHESIOLOGY

## 2023-03-10 PROCEDURE — D9220A PRA ANESTHESIA: ICD-10-PCS | Mod: HCNC,ANES,, | Performed by: ANESTHESIOLOGY

## 2023-03-10 PROCEDURE — 43235 EGD DIAGNOSTIC BRUSH WASH: CPT | Mod: HCNC | Performed by: INTERNAL MEDICINE

## 2023-03-10 PROCEDURE — 25000003 PHARM REV CODE 250: Mod: HCNC | Performed by: NURSE ANESTHETIST, CERTIFIED REGISTERED

## 2023-03-10 PROCEDURE — D9220A PRA ANESTHESIA: ICD-10-PCS | Mod: HCNC,CRNA,, | Performed by: NURSE ANESTHETIST, CERTIFIED REGISTERED

## 2023-03-10 PROCEDURE — D9220A PRA ANESTHESIA: Mod: HCNC,CRNA,, | Performed by: NURSE ANESTHETIST, CERTIFIED REGISTERED

## 2023-03-10 PROCEDURE — 43235 EGD DIAGNOSTIC BRUSH WASH: CPT | Mod: HCNC,,, | Performed by: INTERNAL MEDICINE

## 2023-03-10 PROCEDURE — 82962 GLUCOSE BLOOD TEST: CPT | Mod: HCNC | Performed by: INTERNAL MEDICINE

## 2023-03-10 PROCEDURE — 37000009 HC ANESTHESIA EA ADD 15 MINS: Mod: HCNC | Performed by: INTERNAL MEDICINE

## 2023-03-10 RX ORDER — LIDOCAINE HYDROCHLORIDE 20 MG/ML
INJECTION INTRAVENOUS
Status: DISCONTINUED | OUTPATIENT
Start: 2023-03-10 | End: 2023-03-10

## 2023-03-10 RX ORDER — PROPOFOL 10 MG/ML
VIAL (ML) INTRAVENOUS
Status: DISCONTINUED | OUTPATIENT
Start: 2023-03-10 | End: 2023-03-10

## 2023-03-10 RX ORDER — ONDANSETRON 2 MG/ML
INJECTION INTRAMUSCULAR; INTRAVENOUS
Status: DISCONTINUED | OUTPATIENT
Start: 2023-03-10 | End: 2023-03-10

## 2023-03-10 RX ORDER — SODIUM CHLORIDE 9 MG/ML
INJECTION, SOLUTION INTRAVENOUS CONTINUOUS
Status: DISCONTINUED | OUTPATIENT
Start: 2023-03-10 | End: 2023-03-10 | Stop reason: HOSPADM

## 2023-03-10 RX ORDER — PROPOFOL 10 MG/ML
VIAL (ML) INTRAVENOUS CONTINUOUS PRN
Status: DISCONTINUED | OUTPATIENT
Start: 2023-03-10 | End: 2023-03-10

## 2023-03-10 RX ORDER — SODIUM CHLORIDE 0.9 % (FLUSH) 0.9 %
10 SYRINGE (ML) INJECTION
Status: DISCONTINUED | OUTPATIENT
Start: 2023-03-10 | End: 2023-03-10 | Stop reason: HOSPADM

## 2023-03-10 RX ADMIN — PROPOFOL 50 MG: 10 INJECTION, EMULSION INTRAVENOUS at 03:03

## 2023-03-10 RX ADMIN — SODIUM CHLORIDE: 0.9 INJECTION, SOLUTION INTRAVENOUS at 03:03

## 2023-03-10 RX ADMIN — SODIUM CHLORIDE: 9 INJECTION, SOLUTION INTRAVENOUS at 03:03

## 2023-03-10 RX ADMIN — ONDANSETRON 4 MG: 2 INJECTION INTRAMUSCULAR; INTRAVENOUS at 03:03

## 2023-03-10 RX ADMIN — PROPOFOL 150 MCG/KG/MIN: 10 INJECTION, EMULSION INTRAVENOUS at 03:03

## 2023-03-10 RX ADMIN — LIDOCAINE HYDROCHLORIDE 100 MG: 20 INJECTION INTRAVENOUS at 03:03

## 2023-03-10 NOTE — [employer]OVATION PATIENT INSTRUCTIONS
Discharge Summary/Instructions after an Endoscopic Procedure  Patient Name: Vivienne Jose  Patient MRN: [id number]  Patient YOB: 1950  Friday, March 10, 2023  Christa Caldera MD  Dear patient,  As a result of recent federal legislation (The Federal Cures Act), you may   receive lab or pathology results from your procedure in your [facility]   account before your physician is able to contact you. Your physician or   their representative will relay the results to you with their   recommendations at their soonest availability.  Thank you,  RESTRICTIONS:  During your procedure today, you received medications for sedation.  These   medications may affect your judgment, balance and coordination.  Therefore,   for 24 hours, you have the following restrictions:   - DO NOT drive a car, operate machinery, make legal/financial decisions,   sign important papers or drink alcohol.    ACTIVITY:  Today: no heavy lifting, straining or running due to procedural   sedation/anesthesia.  The following day: return to full activity including work.  DIET:  Eat and drink normally unless instructed otherwise.     TREATMENT FOR COMMON SIDE EFFECTS:  - Mild abdominal pain, nausea, belching, bloating or excessive gas:  rest,   eat lightly and use a heating pad.  - Sore Throat: treat with throat lozenges and/or gargle with warm salt   water.  - Because air was used during the procedure, expelling large amounts of air   from your rectum or belching is normal.  - If a bowel prep was taken, you may not have a bowel movement for 1-3 days.    This is normal.  SYMPTOMS TO WATCH FOR AND REPORT TO YOUR PHYSICIAN:  1. Abdominal pain or bloating, other than gas cramps.  2. Chest pain.  3. Back pain.  4. Signs of infection such as: chills or fever occurring within 24 hours   after the procedure.  5. Rectal bleeding, which would show as bright red, maroon, or black stools.   (A tablespoon of blood from the rectum is not serious, especially if    hemorrhoids are present.)  6. Vomiting.  7. Weakness or dizziness.  GO DIRECTLY TO THE NEAREST EMERGENCY ROOM IF YOU HAVE ANY OF THE FOLLOWING:      Difficulty breathing              Chills and/or fever over 101 F   Persistent vomiting and/or vomiting blood   Severe abdominal pain   Severe chest pain   Black, tarry stools   Bleeding- more than one tablespoon   Any other symptom or condition that you feel may need urgent attention  Your doctor recommends these additional instructions:  If any biopsies were taken, your doctors clinic will contact you in 1 to 2   weeks with any results.  - Discharge patient to home (with escort).   - Resume previous diet.   - Continue present medications.   - The findings and recommendations were discussed with the patient.   - Patient has a contact number available for emergencies.  The signs and   symptoms of potential delayed complications were discussed with the   patient.  Return to normal activities tomorrow.  Written discharge   instructions were provided to the patient.   - Repeat upper endoscopy in 2 years for surveillance.  For questions, problems or results please call your physician - Christa Caldera MD at Work:  [phone].  [facility] [place], EMERGENCY ROOM PHONE NUMBER: [phone]  IF A COMPLICATION OR EMERGENCY SITUATION ARISES AND YOU ARE UNABLE TO REACH   YOUR PHYSICIAN - GO DIRECTLY TO THE EMERGENCY ROOM.  Christa Caldera MD  3/10/2023 3:36:41 PM  This report has been verified and signed electronically.  Dear patient,  As a result of recent federal legislation (The Federal Cures Act), you may   receive lab or pathology results from your procedure in your [facility]   account before your physician is able to contact you. Your physician or   their representative will relay the results to you with their   recommendations at their soonest availability.  Thank you,  PROVATION

## 2023-03-10 NOTE — ANESTHESIA POSTPROCEDURE EVALUATION
Anesthesia Post Evaluation    Patient: Vivienne Jose    Procedure(s) Performed: Procedure(s) (LRB):  EGD (ESOPHAGOGASTRODUODENOSCOPY) (N/A)    Final Anesthesia Type: general      Patient location during evaluation: GI PACU  Patient participation: Yes- Able to Participate  Level of consciousness: awake and alert  Post-procedure vital signs: reviewed and stable  Pain management: adequate  Airway patency: patent    PONV status at discharge: No PONV  Anesthetic complications: no      Cardiovascular status: blood pressure returned to baseline  Respiratory status: spontaneous ventilation  Hydration status: euvolemic  Follow-up not needed.          Vitals Value Taken Time   /57 03/10/23 1617   Temp 36.5 °C (97.7 °F) 03/10/23 1615   Pulse 69 03/10/23 1618   Resp 41 03/10/23 1616   SpO2 99 % 03/10/23 1618   Vitals shown include unvalidated device data.      No case tracking events are documented in the log.      Pain/Gordo Score: Gordo Score: 10 (3/10/2023  4:04 PM)

## 2023-03-10 NOTE — TRANSFER OF CARE
"Anesthesia Transfer of Care Note    Patient: Vivienne Jose    Procedure(s) Performed: Procedure(s) (LRB):  EGD (ESOPHAGOGASTRODUODENOSCOPY) (N/A)    Patient location: [facility]    Anesthesia Type: general    Transport from OR: Transported from OR on 2-3 L/min O2 by NC with adequate spontaneous ventilation    Post pain: adequate analgesia    Post assessment: no apparent anesthetic complications    Post vital signs: stable    Level of consciousness: awake    Nausea/Vomiting: no nausea/vomiting    Complications: none    Transfer of care protocol was followed      Last vitals:   Visit Vitals  BP (!) 96/44   Pulse 74   Temp 36.4 °C (97.6 °F) (Skin)   Resp 16   Ht 5' 2" (1.575 m)   Wt 108 kg (238 lb)   LMP  (LMP Unknown)   SpO2 97%   Breastfeeding No   BMI 43.53 kg/m²     "

## 2023-03-10 NOTE — ANESTHESIA PREPROCEDURE EVALUATION
03/10/2023  Vivienne Jose is a 72 y.o., female with cirrhosis here for EGD to evaluate varices.  Pt gets nauseated with anesthesia - will give zofran    Past Medical History:   Diagnosis Date    Allergy     Angio-edema     Arthritis     Cataract     bilateral - not removed    Cerebrovascular malformation     Cirrhosis 11/24/2020    Colon polyps     Coronary artery disease     Diabetes mellitus, type 2     Diabetic peripheral neuropathy     Edema of both feet 8/19/2022    GERD (gastroesophageal reflux disease)     Herpes infection     Hyperlipidemia     Hypertension     Hypothyroidism     Kidney stones     Mild nonproliferative diabetic retinopathy of 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 edema associated with type 2 diabetes mellitus 4/18/2019    DEL RIO (nonalcoholic steatohepatitis) 8/19/2022    Nausea & vomiting 11/23/2015    Obesity, morbid     Ovarian cyst     Seizure disorder, focal motor     Sleep apnea     Type II or unspecified type diabetes mellitus with neurological manifestations, uncontrolled(250.62)     Urticaria      Lab Results   Component Value Date    WBC 8.18 02/09/2023    HGB 13.1 02/09/2023    HCT 42.2 02/09/2023     (H) 02/09/2023     02/09/2023         Chemistry        Component Value Date/Time     02/09/2023 0829     02/09/2023 0829    K 4.5 02/09/2023 0829    K 4.5 02/09/2023 0829     02/09/2023 0829     02/09/2023 0829    CO2 25 02/09/2023 0829    CO2 25 02/09/2023 0829    BUN 16 02/09/2023 0829    BUN 16 02/09/2023 0829    CREATININE 1.0 02/09/2023 0829    CREATININE 1.0 02/09/2023 0829     (H) 02/09/2023 0829     (H) 02/09/2023 0829        Component Value Date/Time    CALCIUM 10.1 02/09/2023 0829    CALCIUM 10.1 02/09/2023 0829    ALKPHOS 86 02/09/2023 0829    ALKPHOS 86 02/09/2023 0829    AST 24 02/09/2023 0829    AST  24 02/09/2023 0829    ALT 19 02/09/2023 0829    ALT 19 02/09/2023 0829    BILITOT 0.4 02/09/2023 0829    BILITOT 0.4 02/09/2023 0829    ESTGFRAFRICA >60.0 02/07/2022 0715    EGFRNONAA >60.0 02/07/2022 0715        Results for orders placed during the hospital encounter of 12/07/22    Echo    Interpretation Summary  · The left ventricle is normal in size with normal systolic function.  · The estimated ejection fraction is 60%.  · Normal left ventricular diastolic function.  · Mild mitral regurgitation.  · Mild tricuspid regurgitation.  · Normal right ventricular size with normal right ventricular systolic function.  · There is no pulmonary hypertension.        Pre-op Assessment    I have reviewed the Patient Summary Reports.     I have reviewed the Nursing Notes. I have reviewed the NPO Status.      Review of Systems         Anesthesia Plan  Type of Anesthesia, risks & benefits discussed:    Anesthesia Type: Gen Natural Airway  Intra-op Monitoring Plan: Standard ASA Monitors  Post Op Pain Control Plan: multimodal analgesia  Induction:  IV  Informed Consent: Informed consent signed with the Patient and all parties understand the risks and agree with anesthesia plan.  All questions answered.   ASA Score: 3    Ready For Surgery From Anesthesia Perspective.     .

## 2023-03-10 NOTE — H&P
Short Stay Endoscopy History and Physical    PCP - Aislinn Magallon MD     Procedure - EGD  ASA - per anesthesia  Mallampati - per anesthesia  History of Anesthesia problems - no  Family history Anesthesia problems -  no   Plan of anesthesia - General    HPI:  This is a 72 y.o. female here for evaluation of cirrhosis r/o varices:        Medical History:  has a past medical history of Allergy, Angio-edema, Arthritis, Cataract, Cerebrovascular malformation, Cirrhosis (11/24/2020), Colon polyps, Coronary artery disease, Diabetes mellitus, type 2, Diabetic peripheral neuropathy, Edema of both feet (8/19/2022), GERD (gastroesophageal reflux disease), Herpes infection, Hyperlipidemia, Hypertension, Hypothyroidism, Kidney stones, Mild nonproliferative diabetic retinopathy of 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 edema associated with type 2 diabetes mellitus (4/18/2019), DEL RIO (nonalcoholic steatohepatitis) (8/19/2022), Nausea & vomiting (11/23/2015), Obesity, morbid, Ovarian cyst, Seizure disorder, focal motor, Sleep apnea, Type II or unspecified type diabetes mellitus with neurological manifestations, uncontrolled(250.62), and Urticaria.    Surgical History:  has a past surgical history that includes Thyroidectomy (1977); Carpal tunnel release (Right, 2014); Cyst Removal; Tubal ligation; Colonoscopy; TONSILLECTOMY, ADENOIDECTOMY; Extracorporeal shock wave lithotripsy (2002); Colonoscopy (N/A, 9/13/2017); Trigger finger release; Kidney stone surgery; Knee Arthroplasty (Right, 3/6/2019); Joint replacement (Right, 03/06/2019); Hysterectomy (1984); Esophagogastroduodenoscopy (Left, 10/15/2021); and Colonoscopy (N/A, 9/9/2022).    Family History: family history includes Allergic rhinitis in her son; Allergies in her son; Arthritis in her father; Cancer in her father; Dementia in her mother; Diabetes in her daughter; Glaucoma in her maternal aunt; Gout in her father; Hypertension in her daughter and mother; Leukemia in her maternal  uncle; Macular degeneration in her mother; No Known Problems in her daughter and daughter; Skin cancer in her mother.. Otherwise no colon cancer, inflammatory bowel disease, or GI malignancies.    Social History:  reports that she has never smoked. She has never used smokeless tobacco. She reports that she does not drink alcohol and does not use drugs.    Review of patient's allergies indicates:   Allergen Reactions    Sulfa (sulfonamide antibiotics) Nausea Only    Ciprofloxacin     Januvia [sitagliptin]      abd pain    Lisinopril     Lotensin [benazepril]     Nexium [esomeprazole magnesium] Other (See Comments)     Gas       Medications:   No medications prior to admission.       Physical Exam:    Vital Signs: There were no vitals filed for this visit.    I have explained the risks and benefits of endoscopy procedures to the patient including but not limited to bleeding, perforation, infection, and death.      Christa Caldera MD

## 2023-03-10 NOTE — PROGRESS NOTES
Discharge instructions reviewed w/pt and , verbalized understanding. Pt in NADN.No complaints at this time. Tolerated liquids w/ no issues.

## 2023-03-16 ENCOUNTER — TELEPHONE (OUTPATIENT)
Dept: ALLERGY | Facility: CLINIC | Age: 73
End: 2023-03-16

## 2023-03-22 ENCOUNTER — CLINICAL SUPPORT (OUTPATIENT)
Dept: INTERNAL MEDICINE | Facility: CLINIC | Age: 73
End: 2023-03-22
Payer: MEDICARE

## 2023-03-22 DIAGNOSIS — J30.9 CHRONIC ALLERGIC RHINITIS: Primary | ICD-10-CM

## 2023-03-22 PROCEDURE — 95115 IMMUNOTHERAPY ONE INJECTION: CPT | Mod: HCNC,S$GLB,, | Performed by: FAMILY MEDICINE

## 2023-03-22 PROCEDURE — 95115 IMMUNOTHERAPY ONE INJECTION: CPT | Mod: HCNC,S$GLB,, | Performed by: ALLERGY & IMMUNOLOGY

## 2023-03-22 PROCEDURE — 99999 PR PBB SHADOW E&M-EST. PATIENT-LVL I: ICD-10-PCS | Mod: PBBFAC,HCNC,,

## 2023-03-22 PROCEDURE — 95115 PR IMMUNOTHERAPY, ONE INJECTION: ICD-10-PCS | Mod: HCNC,S$GLB,, | Performed by: FAMILY MEDICINE

## 2023-03-22 PROCEDURE — 99499 UNLISTED E&M SERVICE: CPT | Mod: HCNC,S$GLB,, | Performed by: ALLERGY & IMMUNOLOGY

## 2023-03-22 PROCEDURE — 95115 PR IMMUNOTHERAPY, ONE INJECTION: ICD-10-PCS | Mod: HCNC,S$GLB,, | Performed by: ALLERGY & IMMUNOLOGY

## 2023-03-22 PROCEDURE — 99999 PR PBB SHADOW E&M-EST. PATIENT-LVL I: CPT | Mod: PBBFAC,HCNC,,

## 2023-03-22 PROCEDURE — 99499 NO LOS: ICD-10-PCS | Mod: HCNC,S$GLB,, | Performed by: ALLERGY & IMMUNOLOGY

## 2023-04-04 LAB
OHS CV EVENT MONITOR DAY: 13
OHS CV HOLTER HOOKUP DATE: NORMAL
OHS CV HOLTER HOOKUP TIME: NORMAL
OHS CV HOLTER LENGTH DECIMAL HOURS: 332.22
OHS CV HOLTER LENGTH HOURS: 20
OHS CV HOLTER LENGTH MINUTES: 13
OHS CV HOLTER SCAN DATE: NORMAL
OHS CV HOLTER SINUS AVERAGE HR: 65 BPM
OHS CV HOLTER SINUS MAX HR: 150 BPM
OHS CV HOLTER SINUS MIN HR: 37 BPM
OHS CV HOLTER STUDY END DATE: NORMAL
OHS CV HOLTER STUDY END TIME: NORMAL

## 2023-04-19 ENCOUNTER — CLINICAL SUPPORT (OUTPATIENT)
Dept: INTERNAL MEDICINE | Facility: CLINIC | Age: 73
End: 2023-04-19
Payer: MEDICARE

## 2023-04-19 DIAGNOSIS — J30.9 CHRONIC ALLERGIC RHINITIS: Primary | ICD-10-CM

## 2023-04-19 PROCEDURE — 95115 PR IMMUNOTHERAPY, ONE INJECTION: ICD-10-PCS | Mod: HCNC,S$GLB,, | Performed by: FAMILY MEDICINE

## 2023-04-19 PROCEDURE — 95115 IMMUNOTHERAPY ONE INJECTION: CPT | Mod: HCNC,S$GLB,, | Performed by: FAMILY MEDICINE

## 2023-05-02 ENCOUNTER — PES CALL (OUTPATIENT)
Dept: ADMINISTRATIVE | Facility: CLINIC | Age: 73
End: 2023-05-02
Payer: MEDICARE

## 2023-05-23 ENCOUNTER — OFFICE VISIT (OUTPATIENT)
Dept: OPTOMETRY | Facility: CLINIC | Age: 73
End: 2023-05-23
Payer: MEDICARE

## 2023-05-23 DIAGNOSIS — H52.13 MYOPIA WITH ASTIGMATISM AND PRESBYOPIA, BILATERAL: ICD-10-CM

## 2023-05-23 DIAGNOSIS — H25.13 NUCLEAR SCLEROSIS, BILATERAL: ICD-10-CM

## 2023-05-23 DIAGNOSIS — H52.4 MYOPIA WITH ASTIGMATISM AND PRESBYOPIA, BILATERAL: ICD-10-CM

## 2023-05-23 DIAGNOSIS — H52.203 MYOPIA WITH ASTIGMATISM AND PRESBYOPIA, BILATERAL: ICD-10-CM

## 2023-05-23 DIAGNOSIS — E11.9 TYPE 2 DIABETES MELLITUS WITHOUT RETINOPATHY: Primary | ICD-10-CM

## 2023-05-23 PROCEDURE — 1101F PR PT FALLS ASSESS DOC 0-1 FALLS W/OUT INJ PAST YR: ICD-10-PCS | Mod: HCNC,CPTII,S$GLB, | Performed by: OPTOMETRIST

## 2023-05-23 PROCEDURE — 1157F ADVNC CARE PLAN IN RCRD: CPT | Mod: HCNC,CPTII,S$GLB, | Performed by: OPTOMETRIST

## 2023-05-23 PROCEDURE — 3288F PR FALLS RISK ASSESSMENT DOCUMENTED: ICD-10-PCS | Mod: HCNC,CPTII,S$GLB, | Performed by: OPTOMETRIST

## 2023-05-23 PROCEDURE — 1159F PR MEDICATION LIST DOCUMENTED IN MEDICAL RECORD: ICD-10-PCS | Mod: HCNC,CPTII,S$GLB, | Performed by: OPTOMETRIST

## 2023-05-23 PROCEDURE — 99999 PR PBB SHADOW E&M-EST. PATIENT-LVL II: CPT | Mod: PBBFAC,HCNC,, | Performed by: OPTOMETRIST

## 2023-05-23 PROCEDURE — 92014 COMPRE OPH EXAM EST PT 1/>: CPT | Mod: HCNC,S$GLB,, | Performed by: OPTOMETRIST

## 2023-05-23 PROCEDURE — 99999 PR PBB SHADOW E&M-EST. PATIENT-LVL II: ICD-10-PCS | Mod: PBBFAC,HCNC,, | Performed by: OPTOMETRIST

## 2023-05-23 PROCEDURE — 92015 DETERMINE REFRACTIVE STATE: CPT | Mod: HCNC,S$GLB,, | Performed by: OPTOMETRIST

## 2023-05-23 PROCEDURE — 1126F AMNT PAIN NOTED NONE PRSNT: CPT | Mod: HCNC,CPTII,S$GLB, | Performed by: OPTOMETRIST

## 2023-05-23 PROCEDURE — 2023F DILAT RTA XM W/O RTNOPTHY: CPT | Mod: HCNC,CPTII,S$GLB, | Performed by: OPTOMETRIST

## 2023-05-23 PROCEDURE — 3288F FALL RISK ASSESSMENT DOCD: CPT | Mod: HCNC,CPTII,S$GLB, | Performed by: OPTOMETRIST

## 2023-05-23 PROCEDURE — 92015 PR REFRACTION: ICD-10-PCS | Mod: HCNC,S$GLB,, | Performed by: OPTOMETRIST

## 2023-05-23 PROCEDURE — 3044F HG A1C LEVEL LT 7.0%: CPT | Mod: HCNC,CPTII,S$GLB, | Performed by: OPTOMETRIST

## 2023-05-23 PROCEDURE — 3044F PR MOST RECENT HEMOGLOBIN A1C LEVEL <7.0%: ICD-10-PCS | Mod: HCNC,CPTII,S$GLB, | Performed by: OPTOMETRIST

## 2023-05-23 PROCEDURE — 1160F PR REVIEW ALL MEDS BY PRESCRIBER/CLIN PHARMACIST DOCUMENTED: ICD-10-PCS | Mod: HCNC,CPTII,S$GLB, | Performed by: OPTOMETRIST

## 2023-05-23 PROCEDURE — 1126F PR PAIN SEVERITY QUANTIFIED, NO PAIN PRESENT: ICD-10-PCS | Mod: HCNC,CPTII,S$GLB, | Performed by: OPTOMETRIST

## 2023-05-23 PROCEDURE — 4010F PR ACE/ARB THEARPY RXD/TAKEN: ICD-10-PCS | Mod: HCNC,CPTII,S$GLB, | Performed by: OPTOMETRIST

## 2023-05-23 PROCEDURE — 92014 PR EYE EXAM, EST PATIENT,COMPREHESV: ICD-10-PCS | Mod: HCNC,S$GLB,, | Performed by: OPTOMETRIST

## 2023-05-23 PROCEDURE — 1159F MED LIST DOCD IN RCRD: CPT | Mod: HCNC,CPTII,S$GLB, | Performed by: OPTOMETRIST

## 2023-05-23 PROCEDURE — 4010F ACE/ARB THERAPY RXD/TAKEN: CPT | Mod: HCNC,CPTII,S$GLB, | Performed by: OPTOMETRIST

## 2023-05-23 PROCEDURE — 1160F RVW MEDS BY RX/DR IN RCRD: CPT | Mod: HCNC,CPTII,S$GLB, | Performed by: OPTOMETRIST

## 2023-05-23 PROCEDURE — 1101F PT FALLS ASSESS-DOCD LE1/YR: CPT | Mod: HCNC,CPTII,S$GLB, | Performed by: OPTOMETRIST

## 2023-05-23 PROCEDURE — 2023F PR DILATED RETINAL EXAM W/O EVID OF RETINOPATHY: ICD-10-PCS | Mod: HCNC,CPTII,S$GLB, | Performed by: OPTOMETRIST

## 2023-05-23 PROCEDURE — 1157F PR ADVANCE CARE PLAN OR EQUIV PRESENT IN MEDICAL RECORD: ICD-10-PCS | Mod: HCNC,CPTII,S$GLB, | Performed by: OPTOMETRIST

## 2023-05-23 NOTE — PROGRESS NOTES
HPI    73 Y/o female is here for routine eye exam with C/o Pt say's she some   times see's a yellow when looking   Pt denies pain and discomfort   Floaters in OD    Eye med: Zaditor OU QD for itch with relief  Last edited by Lyndon Mathews, OD on 5/23/2023  3:41 PM.            Assessment /Plan     For exam results, see Encounter Report.    Type 2 diabetes mellitus without retinopathy    Nuclear sclerosis, bilateral    Myopia with astigmatism and presbyopia, bilateral      No diabetic retinopathy, no csme. Return in 1 year for dilated eye exam.   2. Educated pt on presence of cataracts and effects on vision. No surgery at this time. Recheck in one year.   3. Spectacle Rx given, discussed different options for glasses. RTC 1 year routine eye exam.

## 2023-05-25 ENCOUNTER — CLINICAL SUPPORT (OUTPATIENT)
Dept: ALLERGY | Facility: CLINIC | Age: 73
End: 2023-05-25
Payer: MEDICARE

## 2023-05-25 DIAGNOSIS — J30.9 CHRONIC ALLERGIC RHINITIS: Primary | ICD-10-CM

## 2023-05-25 PROCEDURE — 95115 IMMUNOTHERAPY ONE INJECTION: CPT | Mod: HCNC,S$GLB,, | Performed by: STUDENT IN AN ORGANIZED HEALTH CARE EDUCATION/TRAINING PROGRAM

## 2023-05-25 PROCEDURE — 95115 PR IMMUNOTHERAPY, ONE INJECTION: ICD-10-PCS | Mod: HCNC,S$GLB,, | Performed by: STUDENT IN AN ORGANIZED HEALTH CARE EDUCATION/TRAINING PROGRAM

## 2023-05-25 NOTE — PROGRESS NOTES
Received 0.5mL Red(1:1) upper left arm SQ. Observed for 30 minutes with no s/s of any adverse reactions. Tolerated injections well.

## 2023-05-29 ENCOUNTER — PATIENT MESSAGE (OUTPATIENT)
Dept: OPTOMETRY | Facility: CLINIC | Age: 73
End: 2023-05-29
Payer: MEDICARE

## 2023-06-06 ENCOUNTER — TELEPHONE (OUTPATIENT)
Dept: ADMINISTRATIVE | Facility: CLINIC | Age: 73
End: 2023-06-06
Payer: MEDICARE

## 2023-06-08 ENCOUNTER — OFFICE VISIT (OUTPATIENT)
Dept: FAMILY MEDICINE | Facility: CLINIC | Age: 73
End: 2023-06-08
Payer: MEDICARE

## 2023-06-08 ENCOUNTER — LAB VISIT (OUTPATIENT)
Dept: LAB | Facility: HOSPITAL | Age: 73
End: 2023-06-08
Attending: NURSE PRACTITIONER
Payer: MEDICARE

## 2023-06-08 VITALS
OXYGEN SATURATION: 96 % | DIASTOLIC BLOOD PRESSURE: 62 MMHG | HEART RATE: 78 BPM | HEIGHT: 62 IN | BODY MASS INDEX: 44.59 KG/M2 | WEIGHT: 242.31 LBS | SYSTOLIC BLOOD PRESSURE: 122 MMHG

## 2023-06-08 DIAGNOSIS — Z78.0 ASYMPTOMATIC POSTMENOPAUSAL STATE: ICD-10-CM

## 2023-06-08 DIAGNOSIS — R39.9 UTI SYMPTOMS: ICD-10-CM

## 2023-06-08 DIAGNOSIS — Z00.00 ENCOUNTER FOR PREVENTIVE HEALTH EXAMINATION: Primary | ICD-10-CM

## 2023-06-08 DIAGNOSIS — D69.2 PURPURA: ICD-10-CM

## 2023-06-08 DIAGNOSIS — E11.3293 MILD NONPROLIFERATIVE DIABETIC RETINOPATHY OF BOTH EYES WITHOUT MACULAR EDEMA ASSOCIATED WITH TYPE 2 DIABETES MELLITUS: ICD-10-CM

## 2023-06-08 DIAGNOSIS — Z79.4 TYPE 2 DIABETES MELLITUS WITH STAGE 3A CHRONIC KIDNEY DISEASE, WITH LONG-TERM CURRENT USE OF INSULIN: ICD-10-CM

## 2023-06-08 DIAGNOSIS — E66.01 MORBID OBESITY WITH BODY MASS INDEX (BMI) OF 40.0 TO 44.9 IN ADULT: ICD-10-CM

## 2023-06-08 DIAGNOSIS — K74.69 OTHER CIRRHOSIS OF LIVER: ICD-10-CM

## 2023-06-08 DIAGNOSIS — E11.42 DIABETIC POLYNEUROPATHY ASSOCIATED WITH TYPE 2 DIABETES MELLITUS: ICD-10-CM

## 2023-06-08 DIAGNOSIS — N18.31 TYPE 2 DIABETES MELLITUS WITH STAGE 3A CHRONIC KIDNEY DISEASE, WITH LONG-TERM CURRENT USE OF INSULIN: ICD-10-CM

## 2023-06-08 DIAGNOSIS — Z00.00 ENCOUNTER FOR MEDICARE ANNUAL WELLNESS EXAM: ICD-10-CM

## 2023-06-08 DIAGNOSIS — I48.91 ATRIAL FIBRILLATION, UNSPECIFIED TYPE: ICD-10-CM

## 2023-06-08 DIAGNOSIS — N30.00 ACUTE CYSTITIS WITHOUT HEMATURIA: Primary | ICD-10-CM

## 2023-06-08 DIAGNOSIS — E11.22 TYPE 2 DIABETES MELLITUS WITH STAGE 3A CHRONIC KIDNEY DISEASE, WITH LONG-TERM CURRENT USE OF INSULIN: ICD-10-CM

## 2023-06-08 DIAGNOSIS — M46.1 SACROILIITIS: ICD-10-CM

## 2023-06-08 DIAGNOSIS — I77.9 BILATERAL CAROTID ARTERY DISEASE, UNSPECIFIED TYPE: ICD-10-CM

## 2023-06-08 DIAGNOSIS — I70.0 ABDOMINAL AORTIC ATHEROSCLEROSIS: ICD-10-CM

## 2023-06-08 LAB
BACTERIA #/AREA URNS AUTO: ABNORMAL /HPF
BILIRUB UR QL STRIP: ABNORMAL
CLARITY UR: ABNORMAL
COLOR UR: YELLOW
GLUCOSE UR QL STRIP: NEGATIVE
HGB UR QL STRIP: NEGATIVE
KETONES UR QL STRIP: ABNORMAL
LEUKOCYTE ESTERASE UR QL STRIP: ABNORMAL
MICROSCOPIC COMMENT: ABNORMAL
NITRITE UR QL STRIP: POSITIVE
PH UR STRIP: 6 [PH] (ref 5–8)
PROT UR QL STRIP: ABNORMAL
SP GR UR STRIP: 1.01 (ref 1–1.03)
SQUAMOUS #/AREA URNS AUTO: 3 /HPF
URN SPEC COLLECT METH UR: ABNORMAL
UROBILINOGEN UR STRIP-ACNC: NEGATIVE EU/DL
WBC #/AREA URNS AUTO: 5 /HPF (ref 0–5)
YEAST UR QL AUTO: ABNORMAL

## 2023-06-08 PROCEDURE — 87077 CULTURE AEROBIC IDENTIFY: CPT | Performed by: NURSE PRACTITIONER

## 2023-06-08 PROCEDURE — 3072F PR LOW RISK FOR RETINOPATHY: ICD-10-PCS | Mod: CPTII,S$GLB,, | Performed by: NURSE PRACTITIONER

## 2023-06-08 PROCEDURE — 1157F ADVNC CARE PLAN IN RCRD: CPT | Mod: CPTII,S$GLB,, | Performed by: NURSE PRACTITIONER

## 2023-06-08 PROCEDURE — G0439 PR MEDICARE ANNUAL WELLNESS SUBSEQUENT VISIT: ICD-10-PCS | Mod: S$GLB,,, | Performed by: NURSE PRACTITIONER

## 2023-06-08 PROCEDURE — 1159F PR MEDICATION LIST DOCUMENTED IN MEDICAL RECORD: ICD-10-PCS | Mod: CPTII,S$GLB,, | Performed by: NURSE PRACTITIONER

## 2023-06-08 PROCEDURE — G0439 PPPS, SUBSEQ VISIT: HCPCS | Mod: S$GLB,,, | Performed by: NURSE PRACTITIONER

## 2023-06-08 PROCEDURE — 3008F PR BODY MASS INDEX (BMI) DOCUMENTED: ICD-10-PCS | Mod: CPTII,S$GLB,, | Performed by: NURSE PRACTITIONER

## 2023-06-08 PROCEDURE — 4010F ACE/ARB THERAPY RXD/TAKEN: CPT | Mod: CPTII,S$GLB,, | Performed by: NURSE PRACTITIONER

## 2023-06-08 PROCEDURE — 1126F AMNT PAIN NOTED NONE PRSNT: CPT | Mod: CPTII,S$GLB,, | Performed by: NURSE PRACTITIONER

## 2023-06-08 PROCEDURE — 1101F PT FALLS ASSESS-DOCD LE1/YR: CPT | Mod: CPTII,S$GLB,, | Performed by: NURSE PRACTITIONER

## 2023-06-08 PROCEDURE — 1170F PR FUNCTIONAL STATUS ASSESSED: ICD-10-PCS | Mod: CPTII,S$GLB,, | Performed by: NURSE PRACTITIONER

## 2023-06-08 PROCEDURE — 81000 URINALYSIS NONAUTO W/SCOPE: CPT | Mod: PO | Performed by: NURSE PRACTITIONER

## 2023-06-08 PROCEDURE — 4010F PR ACE/ARB THEARPY RXD/TAKEN: ICD-10-PCS | Mod: CPTII,S$GLB,, | Performed by: NURSE PRACTITIONER

## 2023-06-08 PROCEDURE — 3074F SYST BP LT 130 MM HG: CPT | Mod: CPTII,S$GLB,, | Performed by: NURSE PRACTITIONER

## 2023-06-08 PROCEDURE — 3074F PR MOST RECENT SYSTOLIC BLOOD PRESSURE < 130 MM HG: ICD-10-PCS | Mod: CPTII,S$GLB,, | Performed by: NURSE PRACTITIONER

## 2023-06-08 PROCEDURE — 3078F DIAST BP <80 MM HG: CPT | Mod: CPTII,S$GLB,, | Performed by: NURSE PRACTITIONER

## 2023-06-08 PROCEDURE — 87186 SC STD MICRODIL/AGAR DIL: CPT | Performed by: NURSE PRACTITIONER

## 2023-06-08 PROCEDURE — 99999 PR PBB SHADOW E&M-EST. PATIENT-LVL V: ICD-10-PCS | Mod: PBBFAC,,, | Performed by: NURSE PRACTITIONER

## 2023-06-08 PROCEDURE — 1157F PR ADVANCE CARE PLAN OR EQUIV PRESENT IN MEDICAL RECORD: ICD-10-PCS | Mod: CPTII,S$GLB,, | Performed by: NURSE PRACTITIONER

## 2023-06-08 PROCEDURE — 3044F PR MOST RECENT HEMOGLOBIN A1C LEVEL <7.0%: ICD-10-PCS | Mod: CPTII,S$GLB,, | Performed by: NURSE PRACTITIONER

## 2023-06-08 PROCEDURE — 1101F PR PT FALLS ASSESS DOC 0-1 FALLS W/OUT INJ PAST YR: ICD-10-PCS | Mod: CPTII,S$GLB,, | Performed by: NURSE PRACTITIONER

## 2023-06-08 PROCEDURE — 3072F LOW RISK FOR RETINOPATHY: CPT | Mod: CPTII,S$GLB,, | Performed by: NURSE PRACTITIONER

## 2023-06-08 PROCEDURE — 87086 URINE CULTURE/COLONY COUNT: CPT | Performed by: NURSE PRACTITIONER

## 2023-06-08 PROCEDURE — 1170F FXNL STATUS ASSESSED: CPT | Mod: CPTII,S$GLB,, | Performed by: NURSE PRACTITIONER

## 2023-06-08 PROCEDURE — 3288F PR FALLS RISK ASSESSMENT DOCUMENTED: ICD-10-PCS | Mod: CPTII,S$GLB,, | Performed by: NURSE PRACTITIONER

## 2023-06-08 PROCEDURE — 1160F RVW MEDS BY RX/DR IN RCRD: CPT | Mod: CPTII,S$GLB,, | Performed by: NURSE PRACTITIONER

## 2023-06-08 PROCEDURE — 87088 URINE BACTERIA CULTURE: CPT | Performed by: NURSE PRACTITIONER

## 2023-06-08 PROCEDURE — 3008F BODY MASS INDEX DOCD: CPT | Mod: CPTII,S$GLB,, | Performed by: NURSE PRACTITIONER

## 2023-06-08 PROCEDURE — 3044F HG A1C LEVEL LT 7.0%: CPT | Mod: CPTII,S$GLB,, | Performed by: NURSE PRACTITIONER

## 2023-06-08 PROCEDURE — 99999 PR PBB SHADOW E&M-EST. PATIENT-LVL V: CPT | Mod: PBBFAC,,, | Performed by: NURSE PRACTITIONER

## 2023-06-08 PROCEDURE — 3288F FALL RISK ASSESSMENT DOCD: CPT | Mod: CPTII,S$GLB,, | Performed by: NURSE PRACTITIONER

## 2023-06-08 PROCEDURE — 3078F PR MOST RECENT DIASTOLIC BLOOD PRESSURE < 80 MM HG: ICD-10-PCS | Mod: CPTII,S$GLB,, | Performed by: NURSE PRACTITIONER

## 2023-06-08 PROCEDURE — 1160F PR REVIEW ALL MEDS BY PRESCRIBER/CLIN PHARMACIST DOCUMENTED: ICD-10-PCS | Mod: CPTII,S$GLB,, | Performed by: NURSE PRACTITIONER

## 2023-06-08 PROCEDURE — 1159F MED LIST DOCD IN RCRD: CPT | Mod: CPTII,S$GLB,, | Performed by: NURSE PRACTITIONER

## 2023-06-08 PROCEDURE — 1126F PR PAIN SEVERITY QUANTIFIED, NO PAIN PRESENT: ICD-10-PCS | Mod: CPTII,S$GLB,, | Performed by: NURSE PRACTITIONER

## 2023-06-08 RX ORDER — NITROFURANTOIN 25; 75 MG/1; MG/1
100 CAPSULE ORAL 2 TIMES DAILY
Qty: 10 CAPSULE | Refills: 0 | Status: SHIPPED | OUTPATIENT
Start: 2023-06-08 | End: 2023-06-13

## 2023-06-08 NOTE — PROGRESS NOTES
"  Vivienne Jose presented for a  Medicare AWV and comprehensive Health Risk Assessment today. The following components were reviewed and updated:    Medical history  Family History  Social history  Allergies and Current Medications  Health Risk Assessment  Health Maintenance  Care Team         ** See Completed Assessments for Annual Wellness Visit within the encounter summary.**         The following assessments were completed:  Living Situation  CAGE  Depression Screening  Timed Get Up and Go  Whisper Test  Cognitive Function Screening      Nutrition Screening  ADL Screening  PAQ Screening        Vitals:    06/08/23 1309   BP: 122/62   BP Location: Right arm   Patient Position: Sitting   BP Method: Large (Manual)   Pulse: 78   SpO2: 96%   Weight: 109.9 kg (242 lb 4.6 oz)   Height: 5' 2" (1.575 m)     Body mass index is 44.31 kg/m².    Physical Exam  Vitals reviewed.   Constitutional:       General: She is awake. She is not in acute distress.     Appearance: Normal appearance. She is well-developed and well-groomed. She is morbidly obese.   HENT:      Head: Normocephalic.   Cardiovascular:      Rate and Rhythm: Normal rate.   Pulmonary:      Effort: Pulmonary effort is normal. No respiratory distress.   Skin:     General: Skin is warm and dry.      Comments: Purpura noted   Neurological:      Mental Status: She is alert and oriented to person, place, and time.      Coordination: Coordination normal.   Psychiatric:         Attention and Perception: Attention normal.         Mood and Affect: Mood and affect normal.         Speech: Speech normal.         Behavior: Behavior normal. Behavior is cooperative.         Thought Content: Thought content normal.             Diagnoses and health risks identified today and associated recommendations/orders:    1. Encounter for preventive health examination    2. Sacroiliitis  Chronic; stable on medication. Follow up with PCP.    3. Other cirrhosis of liver  Chronic; stable. " Followed by Hepatology.    4. Type 2 diabetes mellitus with stage 3a chronic kidney disease, with long-term current use of insulin  Chronic; stable on medication. Follow up with PCP.    5. Mild nonproliferative diabetic retinopathy of 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 edema associated with type 2 diabetes mellitus  Chronic; stable on medication. Followed by Optometry.    6. Bilateral carotid artery disease, unspecified type  Chronic; stable on medication. As seen on previous imaging. Follow up with PCP.    7. Abdominal aortic atherosclerosis  Chronic; stable on medication. As seen on previous imaging. Follow up with PCP.    8. Atrial fibrillation, unspecified type  Chronic; stable. Not on medication. Follow up with PCP.    9. Morbid obesity with body mass index (BMI) of 40.0 to 44.9 in adult  Eat a low salt/low fat ADA diet and discussed importance of engaging in physical activity at least 5x/week for a minimum of 30 min/day.    10. Purpura  As seen on attached photo; follow up with PCP.    11. Diabetic polyneuropathy associated with type 2 diabetes mellitus  Chronic; stable on medication. Follow up with PCP.    12. UTI symptoms  Symptoms started last night including dysuria, frequency/urgency and flank pain. No fever. Tried using OTC symptom relief tablets with no relief..  - Urinalysis; Future  - Urine culture; Future    13. Asymptomatic postmenopausal state  - DXA Bone Density Axial Skeleton 1 or more sites; Future    14. Encounter for Medicare annual wellness exam  - Ambulatory Referral/Consult to Enhanced Annual Wellness Visit (eAWV)      Provided Vivienne with a 5-10 year written screening schedule and personal prevention plan. Recommendations were developed using the USPSTF age appropriate recommendations. Education, counseling, and referrals were provided as needed. After Visit Summary given to patient which includes a list of additional screenings/tests needed.    Follow up for your next annual wellness  visit.    Leta Adair, NP      I offered to discuss advanced care planning, including how to pick a person who would make decisions for you if you were unable to make them for yourself, called a health care power of , and what kind of decisions you might make such as use of life sustaining treatments such as ventilators and tube feeding when faced with a life limiting illness recorded on a living will that they will need to know. (How you want to be cared for as you near the end of your natural life)     X  Patient has advanced directives on file, which we reviewed, and they do not wish to make changes.

## 2023-06-08 NOTE — PATIENT INSTRUCTIONS
Counseling and Referral of Other Preventative  (Italic type indicates deductible and co-insurance are waived)    Patient Name: Vivienne Jose  Today's Date: 6/8/2023    Health Maintenance       Date Due Completion Date    DEXA Scan 05/28/2023 5/28/2020    COVID-19 Vaccine (3 - Moderna risk series) 06/30/2023 (Originally 9/12/2022) 8/15/2022    Hemoglobin A1c 08/09/2023 2/9/2023    Diabetes Urine Screening 08/11/2023 8/11/2022    Mammogram 10/14/2023 10/14/2022    Foot Exam 01/17/2024 1/17/2023 (Done)    Override on 1/17/2023: Done    Override on 8/2/2017: Done    Override on 4/28/2015: Done    Lipid Panel 02/09/2024 2/9/2023    Aspirin/Antiplatelet Therapy 05/23/2024 5/23/2023    Eye Exam 05/23/2024 5/23/2023    TETANUS VACCINE 06/19/2024 6/19/2014    Colorectal Cancer Screening 09/09/2027 9/9/2022        Orders Placed This Encounter   Procedures    Urine culture    DXA Bone Density Axial Skeleton 1 or more sites    Urinalysis     The following information is provided to all patients.  This information is to help you find resources for any of the problems found today that may be affecting your health:                Living healthy guide: www.[facility].[place].gov      Understanding Diabetes: www.diabetes.org      Eating healthy: www.cdc.gov/healthyweight      CDC home safety checklist: www.cdc.gov/steadi/patient.html      Agency on Aging: www.goea.[place].[facility]      Alcoholics anonymous (AA): www.aa.org      Physical Activity: www.leno.nih.gov/ay4argz      Tobacco use: www.quitwithusla.org

## 2023-06-09 ENCOUNTER — TELEPHONE (OUTPATIENT)
Dept: INTERNAL MEDICINE | Facility: CLINIC | Age: 73
End: 2023-06-09
Payer: MEDICARE

## 2023-06-10 LAB — BACTERIA UR CULT: ABNORMAL

## 2023-06-12 ENCOUNTER — HOSPITAL ENCOUNTER (OUTPATIENT)
Dept: RADIOLOGY | Facility: HOSPITAL | Age: 73
Discharge: HOME | End: 2023-06-12
Attending: NURSE PRACTITIONER
Payer: MEDICARE

## 2023-06-12 ENCOUNTER — PATIENT MESSAGE (OUTPATIENT)
Dept: FAMILY MEDICINE | Facility: CLINIC | Age: 73
End: 2023-06-12
Payer: MEDICARE

## 2023-06-12 DIAGNOSIS — Z78.0 ASYMPTOMATIC POSTMENOPAUSAL STATE: ICD-10-CM

## 2023-06-12 PROCEDURE — 77080 DXA BONE DENSITY AXIAL SKELETON 1 OR MORE SITES: ICD-10-PCS | Mod: 26,,, | Performed by: INTERNAL MEDICINE

## 2023-06-12 PROCEDURE — 77080 DXA BONE DENSITY AXIAL: CPT | Mod: 26,,, | Performed by: INTERNAL MEDICINE

## 2023-06-12 PROCEDURE — 77080 DXA BONE DENSITY AXIAL: CPT | Mod: TC

## 2023-06-13 RX ORDER — MAGNESIUM 250 MG
400 TABLET ORAL ONCE
COMMUNITY
End: 2023-08-01 | Stop reason: ALTCHOICE

## 2023-06-21 NOTE — TELEPHONE ENCOUNTER
Last Prescription Date: 6/16/2023  Last Qty/Refills: 90 / R-0  Last Office Visit: 3/30/2023  Future Office Visit: None    Denying refill request, duplicate.    Miriam Arguello RN on 6/21/2023 at 2:50 PM     Requested Prescriptions   Pending Prescriptions Disp Refills     oxyCODONE-acetaminophen (PERCOCET) 5-325 MG tablet 90 tablet 0     Sig: Take 1 tablet by mouth every 6 hours as needed for pain       There is no refill protocol information for this order             Spoke with patient about arrival time @ 0700.   Covid test = Boosted    Prep instructions reviewed: the day before the procedure, follow a clear liquid diet all day, then start the first 1/2 of prep at 5pm and take 2nd 1/2 of prep @ 0200.  Pt must be completely NPO when prep completed @ 0400.              Medications: Do not take Insulin or oral diabetic medications the day of the procedure.  Take as prescribed: heart, seizure and blood pressure medication in the morning with a sip of water (less than an ounce).  Take any breathing medications and bring inhalers to hospital with you Leave all valuables and jewelry at home.     Wear comfortable clothes to procedure to change into hospital gown You cannot drive for 24 hours after your procedure because you will receive sedation for your procedure to make you comfortable.  A ride must be provided at discharge.

## 2023-06-22 ENCOUNTER — CLINICAL SUPPORT (OUTPATIENT)
Dept: ALLERGY | Facility: CLINIC | Age: 73
End: 2023-06-22
Payer: MEDICARE

## 2023-06-22 DIAGNOSIS — J30.9 CHRONIC ALLERGIC RHINITIS: Primary | ICD-10-CM

## 2023-06-22 PROCEDURE — 95115 PR IMMUNOTHERAPY, ONE INJECTION: ICD-10-PCS | Mod: S$GLB,,, | Performed by: STUDENT IN AN ORGANIZED HEALTH CARE EDUCATION/TRAINING PROGRAM

## 2023-06-22 PROCEDURE — 95115 IMMUNOTHERAPY ONE INJECTION: CPT | Mod: S$GLB,,, | Performed by: STUDENT IN AN ORGANIZED HEALTH CARE EDUCATION/TRAINING PROGRAM

## 2023-06-23 ENCOUNTER — HOSPITAL ENCOUNTER (OUTPATIENT)
Dept: RADIOLOGY | Facility: HOSPITAL | Age: 73
Discharge: HOME | End: 2023-06-23
Attending: INTERNAL MEDICINE
Payer: MEDICARE

## 2023-06-23 DIAGNOSIS — K74.69 OTHER CIRRHOSIS OF LIVER: ICD-10-CM

## 2023-06-23 PROCEDURE — 76700 US EXAM ABDOM COMPLETE: CPT | Mod: TC

## 2023-06-23 PROCEDURE — 76700 US EXAM ABDOM COMPLETE: CPT | Mod: 26,,, | Performed by: RADIOLOGY

## 2023-06-23 PROCEDURE — 76700 US ABDOMEN COMPLETE: ICD-10-PCS | Mod: 26,,, | Performed by: RADIOLOGY

## 2023-06-27 DIAGNOSIS — N18.31 TYPE 2 DIABETES MELLITUS WITH STAGE 3A CHRONIC KIDNEY DISEASE, WITH LONG-TERM CURRENT USE OF INSULIN: ICD-10-CM

## 2023-06-27 DIAGNOSIS — E11.22 TYPE 2 DIABETES MELLITUS WITH STAGE 3A CHRONIC KIDNEY DISEASE, WITH LONG-TERM CURRENT USE OF INSULIN: ICD-10-CM

## 2023-06-27 DIAGNOSIS — Z79.4 TYPE 2 DIABETES MELLITUS WITH STAGE 3A CHRONIC KIDNEY DISEASE, WITH LONG-TERM CURRENT USE OF INSULIN: ICD-10-CM

## 2023-06-27 RX ORDER — INSULIN DEGLUDEC 100 U/ML
INJECTION, SOLUTION SUBCUTANEOUS
Qty: 45 ML | Refills: 2 | Status: SHIPPED | OUTPATIENT
Start: 2023-06-27

## 2023-06-27 NOTE — TELEPHONE ENCOUNTER
Refill Decision Note   Vivienne Jose  is requesting a refill authorization.  Brief Assessment and Rationale for Refill:  Approve     Medication Therapy Plan:       Medication Reconciliation Completed: No   Comments:     No Care Gaps recommended.     Note composed:9:55 AM 06/27/2023

## 2023-06-27 NOTE — TELEPHONE ENCOUNTER
No care due was identified.  [facility] Embedded Care Due Messages. Reference number: [id number].   6/27/2023 9:33:00 AM CDT

## 2023-07-08 NOTE — PROGRESS NOTES
[facility]      Name: Vivienne oJse  Clinic Number: [id number]    Date of Treatment: 12/03/2018     Diagnosis:   Encounter Diagnosis   Name Primary?    Chronic left-sided low back pain with left-sided sciatica Yes     Physician: Amelia Antunez, *       Precautions: L knee OA/pain, HTN, diabetes     Pattern of pain determined: 2     Evaluation Date: 10/22/2018  Authorization Period Expiration: 12/31/18  Plan of Care Expiration: 1/21/18  Reassessment Due: 12/21/18    Visit # / Visits authorized: 11/ 20 (Needling next session) (NB: No lap belt on machine)    Medicare charges: (465)    Time In: 2:00  Time Out: 3:00  Total Billable Time: 50 minutes     Subjective   Vivienne arrives to PT and reports her back pain is only at 1/10. She reports pain is no longer at the L hip too, it is now at the middle of the low back (centralized). She feels the main limited factor is her right knee pain which forces her to limp and makes her back hurt. She feels that she can walk longer with less pain.      Patient reports their pain to be 1/10 on a 0-10 scale with 0 being no pain and 10 being the worst pain imaginable.  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[employer]   Leisure: computer,                      Pts goals:  To be able to walk further    Objective      MOVEMENT LOSS 11/21/18    ROM Loss   Flexion min loss, no curve reversal   Extension moderate loss   Side bending Right minimal loss   Side bending Left minimal loss   Rotation Right minimal loss   Rotation Left minimal loss       Baseline Isometric Testing on Med X equipment: Testing administered by PT  Date of testing: 10/22/18  ROM 0-30 deg (increased to 36-0 on 11/27/18)    Max Peak Torque 117    Min Peak Torque 25    Flex/Ext Ratio 4.68:1   % below normative data 18   Counter weight 241   femur 6   Seat pad 0               CMS Impairment/Limitation/Restriction for FOTO  Attempted, left message lumbar Survey     Therapist reviewed FOTO scores for Vivienne Jose on 10/22/2018.   FOTO documents entered into [employer] - see Media section.     Limitation Score: 63%  Category: Mobility     Current : CL = least 60% but < 80% impaired, limited or restricted  Goal: CK = at least 40% but < 60% impaired, limited or restricted    Visit 10: 60%                  Treatment      Home Exercise Program as follows: see pt instructions for HEP. Seated flexion  Handouts were given to the patient. Pt demo good understanding of the education provided. Vivienne demonstrated good return demonstration of activities.        Vivienne received therapeutic exercises to develop/improve posture, lumbar/cervical ROM, strength and muscular endurance for 60 minutes including the following exercises:               [facility] 12/3/2018   Visit Number 11   VAS Pain Rating 1   Time 10   Flexion in Lying 10   Flexion in Sitting 10   Manual Therapy -   Lumbar Extension Seat Pad -   Femur Restraint -   Top Dead Center -   Counterweight -   Lumbar Flexion -   Lumbar Extension -   Lumbar Peak Torque -   Min Torque -   Test Percent Below Normative Data -   Test Percent Gain in Strength from Initial  -   Lumbar Weight 50   Repetitions 20   Rating of Perceived Exertion 3   Ice - Sitting 10       NB: possibly No lap belt    HEP:    Seated flexion: x 15  Standing flexion : x 10  Supine flexion: x 10 with ball  Standing extension x 10  Prone press ups x 10        NEUROMUSCULAR:   Taping R knee for pain and stability.None today    Assessment       Patient is making good progress towards established goals. Pt is slowly progressing and she is complain with her HEP. She tolerated the lumbar machine with no c/o LBP.   Pt will continue to benefit from skilled outpatient physical therapy to address the deficits stated in the impairment chart, provide pt/family education and to maximize pt's level of independence in the home and community environment.       Pt's  spiritual, cultural and educational needs considered and pt agreeable to plan of care and goals as stated below:     Medical necessity is demonstrated by the following IMPAIRMENTS/PROBLEMS:    Pt presents with the following impairments:      History  Co-morbidities and personal factors that may impact the plan of care Co-morbidities:   CAD, diabetes, HTN and OA, knee pain, obesity     Personal Factors:   coping style       moderate   Examination  Body Structures and Functions, activity limitations and participation restrictions that may impact the plan of care Body Regions:   back  lower extremities     Body Systems:    ROM  strength  gait     Participation Restrictions:   Walking, standing, house work, cooking showering     Activity limitations:   Learning and applying knowledge  no deficits     General Tasks and Commands  no deficits     Communication  no deficits     Mobility  lifting and carrying objects  walking     Self care  washing oneself (bathing, drying, washing hands)     Domestic Life  shopping  cooking  doing house work (cleaning house, washing dishes, laundry)     Interactions/Relationships  no deficits     Life Areas  no deficits     Community and Social Life  no deficits             moderate   Clinical Presentation stable and uncomplicated low   Decision Making/ Complexity Score: moderate         GOALS: Pt is in agreement with the following goals.     Short term goals:  6 weeks or 10 visits   1.  Pt will demonstrate increased lumbar ROM by at least 3 degrees from the initial ROM value with improvements noted in functional ROM and ability to perform ADLs Met 11/27/18  2.  Pt will demonstrate increased maximum isometric torque value by 5% when compared to the initial value resulting in improved ability to perform bending, lifting, and carrying activities safely, confidently. (Progressing)     3.  Patient report a reduction in worst pain score by 1-2 points for improved tolerance during work and  recreational activities (Progressing)  4.  Pt able to perform HEP correctly with minimal cueing or supervision for therapist (Progressing)        Long term goals: 13 weeks or 20 visits   1. Pt will demonstrate increased lumbar ROM by at least 6 degrees from initial ROM value, resulting in improved ability to perform functional fwd bending while standing and sitting. Met 11/27/18  2. Pt will demonstrate increased maximum isometric torque value by 10% when compared to the initial value resulting in improved ability to perform bending, lifting, and carrying activities safely, confidently. (Progressing)  3. Pt to demonstrate ability to independently control and reduce their pain through posture positioning and mechanical movements throughout a typical day. (Progressing)  4.  Patient will demonstrate improved overall function per FOTO Survey to CK = at least 40% but < 60% impaired, limited or restricted score or less.  5. Pt to begin a progressive walking program in order to increase her physical activity for ADLs and exercising. (Progressing)    Plan   Continue with established Plan of Care towards established PT goals.       Madisyn Chatterjee, PTA  12/3/2018

## 2023-07-13 ENCOUNTER — CLINICAL SUPPORT (OUTPATIENT)
Dept: ALLERGY | Facility: CLINIC | Age: 73
End: 2023-07-13
Payer: MEDICARE

## 2023-07-13 DIAGNOSIS — J30.9 CHRONIC ALLERGIC RHINITIS: Primary | ICD-10-CM

## 2023-07-13 PROCEDURE — 95115 IMMUNOTHERAPY ONE INJECTION: CPT | Mod: HCNC,S$GLB,, | Performed by: STUDENT IN AN ORGANIZED HEALTH CARE EDUCATION/TRAINING PROGRAM

## 2023-07-13 PROCEDURE — 95115 PR IMMUNOTHERAPY, ONE INJECTION: ICD-10-PCS | Mod: HCNC,S$GLB,, | Performed by: STUDENT IN AN ORGANIZED HEALTH CARE EDUCATION/TRAINING PROGRAM

## 2023-07-24 ENCOUNTER — OFFICE VISIT (OUTPATIENT)
Dept: OPHTHALMOLOGY | Facility: CLINIC | Age: 73
End: 2023-07-24
Payer: MEDICARE

## 2023-07-24 DIAGNOSIS — I10 ESSENTIAL HYPERTENSION: ICD-10-CM

## 2023-07-24 DIAGNOSIS — H26.9 CORTICAL CATARACT OF BOTH EYES: ICD-10-CM

## 2023-07-24 DIAGNOSIS — E11.69 DYSLIPIDEMIA ASSOCIATED WITH TYPE 2 DIABETES MELLITUS: ICD-10-CM

## 2023-07-24 DIAGNOSIS — E11.3293 MILD NONPROLIFERATIVE DIABETIC RETINOPATHY OF BOTH EYES WITHOUT MACULAR EDEMA ASSOCIATED WITH TYPE 2 DIABETES MELLITUS: ICD-10-CM

## 2023-07-24 DIAGNOSIS — E78.5 DYSLIPIDEMIA ASSOCIATED WITH TYPE 2 DIABETES MELLITUS: ICD-10-CM

## 2023-07-24 DIAGNOSIS — H52.7 REFRACTIVE ERROR: ICD-10-CM

## 2023-07-24 DIAGNOSIS — H25.13 NUCLEAR SCLEROSIS OF BOTH EYES: Primary | ICD-10-CM

## 2023-07-24 PROCEDURE — 92004 PR EYE EXAM, NEW PATIENT,COMPREHESV: ICD-10-PCS | Mod: HCNC,S$GLB,, | Performed by: OPHTHALMOLOGY

## 2023-07-24 PROCEDURE — 1157F ADVNC CARE PLAN IN RCRD: CPT | Mod: HCNC,CPTII,S$GLB, | Performed by: OPHTHALMOLOGY

## 2023-07-24 PROCEDURE — 1159F PR MEDICATION LIST DOCUMENTED IN MEDICAL RECORD: ICD-10-PCS | Mod: HCNC,CPTII,S$GLB, | Performed by: OPHTHALMOLOGY

## 2023-07-24 PROCEDURE — 3044F HG A1C LEVEL LT 7.0%: CPT | Mod: HCNC,CPTII,S$GLB, | Performed by: OPHTHALMOLOGY

## 2023-07-24 PROCEDURE — 1157F PR ADVANCE CARE PLAN OR EQUIV PRESENT IN MEDICAL RECORD: ICD-10-PCS | Mod: HCNC,CPTII,S$GLB, | Performed by: OPHTHALMOLOGY

## 2023-07-24 PROCEDURE — 99999 PR PBB SHADOW E&M-EST. PATIENT-LVL II: ICD-10-PCS | Mod: PBBFAC,HCNC,, | Performed by: OPHTHALMOLOGY

## 2023-07-24 PROCEDURE — 4010F ACE/ARB THERAPY RXD/TAKEN: CPT | Mod: HCNC,CPTII,S$GLB, | Performed by: OPHTHALMOLOGY

## 2023-07-24 PROCEDURE — 4010F PR ACE/ARB THEARPY RXD/TAKEN: ICD-10-PCS | Mod: HCNC,CPTII,S$GLB, | Performed by: OPHTHALMOLOGY

## 2023-07-24 PROCEDURE — 92136 BIOMETRY: ICD-10-PCS | Mod: HCNC,RT,S$GLB, | Performed by: OPHTHALMOLOGY

## 2023-07-24 PROCEDURE — 1159F MED LIST DOCD IN RCRD: CPT | Mod: HCNC,CPTII,S$GLB, | Performed by: OPHTHALMOLOGY

## 2023-07-24 PROCEDURE — 92136 OPHTHALMIC BIOMETRY: CPT | Mod: HCNC,RT,S$GLB, | Performed by: OPHTHALMOLOGY

## 2023-07-24 PROCEDURE — 1160F RVW MEDS BY RX/DR IN RCRD: CPT | Mod: HCNC,CPTII,S$GLB, | Performed by: OPHTHALMOLOGY

## 2023-07-24 PROCEDURE — 99999 PR PBB SHADOW E&M-EST. PATIENT-LVL II: CPT | Mod: PBBFAC,HCNC,, | Performed by: OPHTHALMOLOGY

## 2023-07-24 PROCEDURE — 3044F PR MOST RECENT HEMOGLOBIN A1C LEVEL <7.0%: ICD-10-PCS | Mod: HCNC,CPTII,S$GLB, | Performed by: OPHTHALMOLOGY

## 2023-07-24 PROCEDURE — 1160F PR REVIEW ALL MEDS BY PRESCRIBER/CLIN PHARMACIST DOCUMENTED: ICD-10-PCS | Mod: HCNC,CPTII,S$GLB, | Performed by: OPHTHALMOLOGY

## 2023-07-24 PROCEDURE — 92004 COMPRE OPH EXAM NEW PT 1/>: CPT | Mod: HCNC,S$GLB,, | Performed by: OPHTHALMOLOGY

## 2023-07-24 NOTE — PROGRESS NOTES
Subjective:       Patient ID: Vivienne Jose is a 73 y.o. female.    Chief Complaint: Cataract (Patient Vivienne Jose is a 73 year old female.)    HPI     Cataract     Additional comments: Patient Vivienne Jose is a 73 year old female.           Comments    Pt referred by Dr. Mathews for cataract evaluation OU.  Pt states that VA OD>OS interferes with VA.  Pt states watery eyes with glare with sunlight and night driving, pt   unable to drive at night.  Pt would like cataract sx prior to grandson's wedding in November 2023.  Pt states continued floater in VA OD.    DLS: 05/23/2023 with Dr. Mathews    Gtts: Zaditor PRN OU    POHx:  1. Type 2 diabetes mellitus without retinopathy  2. Nuclear sclerosis, bilateral  3. Myopia with astigmatism and presbyopia, bilateral    (+)DM  Hemoglobin A1C       Date                     Value               Ref Range             Status                02/09/2023               6.3 (H)             4.0 - 5.6 %           Final                   08/04/2022               6.1 (H)             4.0 - 5.6 %           Final                   02/07/2022               6.6 (H)             4.0 - 5.6 %           Final          Last edited by Lisa Mary on 7/24/2023  1:13 PM.             Assessment:       1. Nuclear sclerosis of both eyes    2. Cortical cataract of both eyes    3. Mild nonproliferative diabetic retinopathy of 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 edema associated with type 2 diabetes mellitus    4. Essential hypertension    5. Refractive error        Plan:       Visually significant cataract OU -Pt. Wants Sx.     Mild NPDR OU-No CSME OU.  HTN-No retinopathy OU.  RE-Pt does not want Toric IOL's.      Cataract Surgery Consent: Patient with a visually significant cataract with difficulties of ADLs, reading, driving, night vision, glare (any and all).  Discussed with Patient/Family/Caregiver: options, risks and benefits, expectations of cataract surgery, utilized an eye model with questions and  answers to facilitate discussion.  Discussed lens options and patient understands that glasses may be required for optimal vision for distance and/or near vision after cataract surgery.  The Patient/Family/Caregiver  voice good understanding and patient wishes to proceed with surgery.  The patient will likely benefit from surgery and patient signed consent for Right Eye.   Will call Pt to schedule CE OD 1st CNAOTO 19.0,                                             OS 2nd CNAOTO 18.5.  Control DM & HTN.

## 2023-07-25 RX ORDER — LOVASTATIN 40 MG/1
TABLET ORAL
Qty: 90 TABLET | Refills: 1 | Status: SHIPPED | OUTPATIENT
Start: 2023-07-25

## 2023-07-25 NOTE — TELEPHONE ENCOUNTER
Refill Decision Note   Vivienne Jose  is requesting a refill authorization.  Brief Assessment and Rationale for Refill:  Approve     Medication Therapy Plan:         Comments:     Note composed:2:50 PM 07/25/2023

## 2023-07-25 NOTE — TELEPHONE ENCOUNTER
No care due was identified.  [facility] Embedded Care Due Messages. Reference number: [id number].   7/24/2023 7:42:46 PM CDT

## 2023-08-01 ENCOUNTER — TELEPHONE (OUTPATIENT)
Dept: OPHTHALMOLOGY | Facility: CLINIC | Age: 73
End: 2023-08-01
Payer: MEDICARE

## 2023-08-01 ENCOUNTER — OFFICE VISIT (OUTPATIENT)
Dept: ALLERGY | Facility: CLINIC | Age: 73
End: 2023-08-01
Payer: MEDICARE

## 2023-08-01 VITALS
SYSTOLIC BLOOD PRESSURE: 117 MMHG | BODY MASS INDEX: 44.76 KG/M2 | DIASTOLIC BLOOD PRESSURE: 49 MMHG | HEART RATE: 56 BPM | WEIGHT: 244.69 LBS

## 2023-08-01 DIAGNOSIS — J30.9 CHRONIC ALLERGIC RHINITIS: Primary | ICD-10-CM

## 2023-08-01 DIAGNOSIS — H10.13 ALLERGIC CONJUNCTIVITIS, BILATERAL: ICD-10-CM

## 2023-08-01 DIAGNOSIS — J30.81 CHRONIC ALLERGIC RHINITIS DUE TO ANIMAL HAIR AND DANDER: ICD-10-CM

## 2023-08-01 DIAGNOSIS — J30.0 VASOMOTOR RHINITIS: ICD-10-CM

## 2023-08-01 DIAGNOSIS — Z51.6 ALLERGY DESENSITIZATION THERAPY: ICD-10-CM

## 2023-08-01 DIAGNOSIS — H25.11 NS (NUCLEAR SCLEROSIS), RIGHT: Primary | ICD-10-CM

## 2023-08-01 DIAGNOSIS — J30.89 ALLERGIC RHINITIS DUE TO DUST MITE: ICD-10-CM

## 2023-08-01 PROCEDURE — 3072F PR LOW RISK FOR RETINOPATHY: ICD-10-PCS | Mod: HCNC,CPTII,S$GLB, | Performed by: ALLERGY & IMMUNOLOGY

## 2023-08-01 PROCEDURE — 3072F LOW RISK FOR RETINOPATHY: CPT | Mod: HCNC,CPTII,S$GLB, | Performed by: ALLERGY & IMMUNOLOGY

## 2023-08-01 PROCEDURE — 99999 PR PBB SHADOW E&M-EST. PATIENT-LVL IV: CPT | Mod: PBBFAC,HCNC,, | Performed by: ALLERGY & IMMUNOLOGY

## 2023-08-01 PROCEDURE — 3044F PR MOST RECENT HEMOGLOBIN A1C LEVEL <7.0%: ICD-10-PCS | Mod: HCNC,CPTII,S$GLB, | Performed by: ALLERGY & IMMUNOLOGY

## 2023-08-01 PROCEDURE — 4010F ACE/ARB THERAPY RXD/TAKEN: CPT | Mod: HCNC,CPTII,S$GLB, | Performed by: ALLERGY & IMMUNOLOGY

## 2023-08-01 PROCEDURE — 3044F HG A1C LEVEL LT 7.0%: CPT | Mod: HCNC,CPTII,S$GLB, | Performed by: ALLERGY & IMMUNOLOGY

## 2023-08-01 PROCEDURE — 1159F PR MEDICATION LIST DOCUMENTED IN MEDICAL RECORD: ICD-10-PCS | Mod: HCNC,CPTII,S$GLB, | Performed by: ALLERGY & IMMUNOLOGY

## 2023-08-01 PROCEDURE — 1157F PR ADVANCE CARE PLAN OR EQUIV PRESENT IN MEDICAL RECORD: ICD-10-PCS | Mod: HCNC,CPTII,S$GLB, | Performed by: ALLERGY & IMMUNOLOGY

## 2023-08-01 PROCEDURE — 99214 OFFICE O/P EST MOD 30 MIN: CPT | Mod: HCNC,S$GLB,, | Performed by: ALLERGY & IMMUNOLOGY

## 2023-08-01 PROCEDURE — 3078F DIAST BP <80 MM HG: CPT | Mod: HCNC,CPTII,S$GLB, | Performed by: ALLERGY & IMMUNOLOGY

## 2023-08-01 PROCEDURE — 1159F MED LIST DOCD IN RCRD: CPT | Mod: HCNC,CPTII,S$GLB, | Performed by: ALLERGY & IMMUNOLOGY

## 2023-08-01 PROCEDURE — 3008F PR BODY MASS INDEX (BMI) DOCUMENTED: ICD-10-PCS | Mod: HCNC,CPTII,S$GLB, | Performed by: ALLERGY & IMMUNOLOGY

## 2023-08-01 PROCEDURE — 3074F SYST BP LT 130 MM HG: CPT | Mod: HCNC,CPTII,S$GLB, | Performed by: ALLERGY & IMMUNOLOGY

## 2023-08-01 PROCEDURE — 3078F PR MOST RECENT DIASTOLIC BLOOD PRESSURE < 80 MM HG: ICD-10-PCS | Mod: HCNC,CPTII,S$GLB, | Performed by: ALLERGY & IMMUNOLOGY

## 2023-08-01 PROCEDURE — 1157F ADVNC CARE PLAN IN RCRD: CPT | Mod: HCNC,CPTII,S$GLB, | Performed by: ALLERGY & IMMUNOLOGY

## 2023-08-01 PROCEDURE — 99214 PR OFFICE/OUTPT VISIT, EST, LEVL IV, 30-39 MIN: ICD-10-PCS | Mod: HCNC,S$GLB,, | Performed by: ALLERGY & IMMUNOLOGY

## 2023-08-01 PROCEDURE — 1160F RVW MEDS BY RX/DR IN RCRD: CPT | Mod: HCNC,CPTII,S$GLB, | Performed by: ALLERGY & IMMUNOLOGY

## 2023-08-01 PROCEDURE — 1160F PR REVIEW ALL MEDS BY PRESCRIBER/CLIN PHARMACIST DOCUMENTED: ICD-10-PCS | Mod: HCNC,CPTII,S$GLB, | Performed by: ALLERGY & IMMUNOLOGY

## 2023-08-01 PROCEDURE — 3008F BODY MASS INDEX DOCD: CPT | Mod: HCNC,CPTII,S$GLB, | Performed by: ALLERGY & IMMUNOLOGY

## 2023-08-01 PROCEDURE — 4010F PR ACE/ARB THEARPY RXD/TAKEN: ICD-10-PCS | Mod: HCNC,CPTII,S$GLB, | Performed by: ALLERGY & IMMUNOLOGY

## 2023-08-01 PROCEDURE — 3074F PR MOST RECENT SYSTOLIC BLOOD PRESSURE < 130 MM HG: ICD-10-PCS | Mod: HCNC,CPTII,S$GLB, | Performed by: ALLERGY & IMMUNOLOGY

## 2023-08-01 PROCEDURE — 99999 PR PBB SHADOW E&M-EST. PATIENT-LVL IV: ICD-10-PCS | Mod: PBBFAC,HCNC,, | Performed by: ALLERGY & IMMUNOLOGY

## 2023-08-01 RX ORDER — IPRATROPIUM BROMIDE 42 UG/1
2 SPRAY, METERED NASAL 4 TIMES DAILY
Qty: 45 ML | Refills: 4 | Status: SHIPPED | OUTPATIENT
Start: 2023-08-01
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
## 2023-08-02 ENCOUNTER — TELEPHONE (OUTPATIENT)
Dept: OPHTHALMOLOGY | Facility: CLINIC | Age: 73
End: 2023-08-02
Payer: MEDICARE

## 2023-08-02 DIAGNOSIS — H25.12 NS (NUCLEAR SCLEROSIS), LEFT: Primary | ICD-10-CM

## 2023-08-03 ENCOUNTER — OFFICE VISIT (OUTPATIENT)
Dept: CARDIOLOGY | Facility: CLINIC | Age: 73
End: 2023-08-03
Payer: MEDICARE

## 2023-08-03 ENCOUNTER — CLINICAL SUPPORT (OUTPATIENT)
Dept: ALLERGY | Facility: CLINIC | Age: 73
End: 2023-08-03
Payer: MEDICARE

## 2023-08-03 VITALS
DIASTOLIC BLOOD PRESSURE: 85 MMHG | HEIGHT: 62 IN | SYSTOLIC BLOOD PRESSURE: 115 MMHG | HEART RATE: 52 BPM | BODY MASS INDEX: 45.28 KG/M2 | WEIGHT: 246.06 LBS

## 2023-08-03 DIAGNOSIS — I65.23 BILATERAL CAROTID ARTERY STENOSIS: ICD-10-CM

## 2023-08-03 DIAGNOSIS — I70.0 ABDOMINAL AORTIC ATHEROSCLEROSIS: ICD-10-CM

## 2023-08-03 DIAGNOSIS — E78.00 PURE HYPERCHOLESTEROLEMIA: ICD-10-CM

## 2023-08-03 DIAGNOSIS — I48.91 ATRIAL FIBRILLATION, UNSPECIFIED TYPE: ICD-10-CM

## 2023-08-03 DIAGNOSIS — I25.10 CORONARY ARTERY DISEASE INVOLVING NATIVE CORONARY ARTERY OF NATIVE HEART WITHOUT ANGINA PECTORIS: Primary | ICD-10-CM

## 2023-08-03 DIAGNOSIS — I10 ESSENTIAL HYPERTENSION: ICD-10-CM

## 2023-08-03 DIAGNOSIS — J30.9 CHRONIC ALLERGIC RHINITIS: Primary | ICD-10-CM

## 2023-08-03 PROCEDURE — 3044F PR MOST RECENT HEMOGLOBIN A1C LEVEL <7.0%: ICD-10-PCS | Mod: CPTII,S$GLB,, | Performed by: INTERNAL MEDICINE

## 2023-08-03 PROCEDURE — 3008F BODY MASS INDEX DOCD: CPT | Mod: CPTII,S$GLB,, | Performed by: INTERNAL MEDICINE

## 2023-08-03 PROCEDURE — 3074F SYST BP LT 130 MM HG: CPT | Mod: CPTII,S$GLB,, | Performed by: INTERNAL MEDICINE

## 2023-08-03 PROCEDURE — 3072F LOW RISK FOR RETINOPATHY: CPT | Mod: CPTII,S$GLB,, | Performed by: INTERNAL MEDICINE

## 2023-08-03 PROCEDURE — 1160F PR REVIEW ALL MEDS BY PRESCRIBER/CLIN PHARMACIST DOCUMENTED: ICD-10-PCS | Mod: CPTII,S$GLB,, | Performed by: INTERNAL MEDICINE

## 2023-08-03 PROCEDURE — 1126F PR PAIN SEVERITY QUANTIFIED, NO PAIN PRESENT: ICD-10-PCS | Mod: CPTII,S$GLB,, | Performed by: INTERNAL MEDICINE

## 2023-08-03 PROCEDURE — 99999 PR PBB SHADOW E&M-EST. PATIENT-LVL V: CPT | Mod: PBBFAC,,, | Performed by: INTERNAL MEDICINE

## 2023-08-03 PROCEDURE — 99999 PR PBB SHADOW E&M-EST. PATIENT-LVL V: ICD-10-PCS | Mod: PBBFAC,,, | Performed by: INTERNAL MEDICINE

## 2023-08-03 PROCEDURE — 4010F PR ACE/ARB THEARPY RXD/TAKEN: ICD-10-PCS | Mod: CPTII,S$GLB,, | Performed by: INTERNAL MEDICINE

## 2023-08-03 PROCEDURE — 3044F HG A1C LEVEL LT 7.0%: CPT | Mod: CPTII,S$GLB,, | Performed by: INTERNAL MEDICINE

## 2023-08-03 PROCEDURE — 1159F PR MEDICATION LIST DOCUMENTED IN MEDICAL RECORD: ICD-10-PCS | Mod: CPTII,S$GLB,, | Performed by: INTERNAL MEDICINE

## 2023-08-03 PROCEDURE — 99214 PR OFFICE/OUTPT VISIT, EST, LEVL IV, 30-39 MIN: ICD-10-PCS | Mod: S$GLB,,, | Performed by: INTERNAL MEDICINE

## 2023-08-03 PROCEDURE — 1101F PT FALLS ASSESS-DOCD LE1/YR: CPT | Mod: CPTII,S$GLB,, | Performed by: INTERNAL MEDICINE

## 2023-08-03 PROCEDURE — 3288F PR FALLS RISK ASSESSMENT DOCUMENTED: ICD-10-PCS | Mod: CPTII,S$GLB,, | Performed by: INTERNAL MEDICINE

## 2023-08-03 PROCEDURE — 3079F PR MOST RECENT DIASTOLIC BLOOD PRESSURE 80-89 MM HG: ICD-10-PCS | Mod: CPTII,S$GLB,, | Performed by: INTERNAL MEDICINE

## 2023-08-03 PROCEDURE — 99214 OFFICE O/P EST MOD 30 MIN: CPT | Mod: S$GLB,,, | Performed by: INTERNAL MEDICINE

## 2023-08-03 PROCEDURE — 3074F PR MOST RECENT SYSTOLIC BLOOD PRESSURE < 130 MM HG: ICD-10-PCS | Mod: CPTII,S$GLB,, | Performed by: INTERNAL MEDICINE

## 2023-08-03 PROCEDURE — 1160F RVW MEDS BY RX/DR IN RCRD: CPT | Mod: CPTII,S$GLB,, | Performed by: INTERNAL MEDICINE

## 2023-08-03 PROCEDURE — 3288F FALL RISK ASSESSMENT DOCD: CPT | Mod: CPTII,S$GLB,, | Performed by: INTERNAL MEDICINE

## 2023-08-03 PROCEDURE — 3079F DIAST BP 80-89 MM HG: CPT | Mod: CPTII,S$GLB,, | Performed by: INTERNAL MEDICINE

## 2023-08-03 PROCEDURE — 95115 IMMUNOTHERAPY ONE INJECTION: CPT | Mod: HCNC,S$GLB,, | Performed by: STUDENT IN AN ORGANIZED HEALTH CARE EDUCATION/TRAINING PROGRAM

## 2023-08-03 PROCEDURE — 1157F ADVNC CARE PLAN IN RCRD: CPT | Mod: CPTII,S$GLB,, | Performed by: INTERNAL MEDICINE

## 2023-08-03 PROCEDURE — 1101F PR PT FALLS ASSESS DOC 0-1 FALLS W/OUT INJ PAST YR: ICD-10-PCS | Mod: CPTII,S$GLB,, | Performed by: INTERNAL MEDICINE

## 2023-08-03 PROCEDURE — 4010F ACE/ARB THERAPY RXD/TAKEN: CPT | Mod: CPTII,S$GLB,, | Performed by: INTERNAL MEDICINE

## 2023-08-03 PROCEDURE — 1157F PR ADVANCE CARE PLAN OR EQUIV PRESENT IN MEDICAL RECORD: ICD-10-PCS | Mod: CPTII,S$GLB,, | Performed by: INTERNAL MEDICINE

## 2023-08-03 PROCEDURE — 3008F PR BODY MASS INDEX (BMI) DOCUMENTED: ICD-10-PCS | Mod: CPTII,S$GLB,, | Performed by: INTERNAL MEDICINE

## 2023-08-03 PROCEDURE — 3072F PR LOW RISK FOR RETINOPATHY: ICD-10-PCS | Mod: CPTII,S$GLB,, | Performed by: INTERNAL MEDICINE

## 2023-08-03 PROCEDURE — 1159F MED LIST DOCD IN RCRD: CPT | Mod: CPTII,S$GLB,, | Performed by: INTERNAL MEDICINE

## 2023-08-03 PROCEDURE — 95115 PR IMMUNOTHERAPY, ONE INJECTION: ICD-10-PCS | Mod: HCNC,S$GLB,, | Performed by: STUDENT IN AN ORGANIZED HEALTH CARE EDUCATION/TRAINING PROGRAM

## 2023-08-03 PROCEDURE — 1126F AMNT PAIN NOTED NONE PRSNT: CPT | Mod: CPTII,S$GLB,, | Performed by: INTERNAL MEDICINE

## 2023-08-03 RX ORDER — GABAPENTIN 600 MG/1
TABLET ORAL
COMMUNITY
Start: 2023-06-27 | End: 2023-09-13 | Stop reason: SDUPTHER

## 2023-08-03 NOTE — PROGRESS NOTES
Subjective:   08/03/2023     Patient ID:  Vivienne Jose is a 73 y.o. female who presents for evaulation of Hypertension, Coronary Artery Disease, Hyperlipidemia, and Atrial Fibrillation      Patient previously seen by Cardiology, had palpitations, possible atrial fibrillation noted, referred to EP, prolonged monitor did not show evidence for fibrillation.  Currently not anticoagulated.       Prior diagnosis of coronary artery disease noted, patient with no exertional chest pains or tightness.    Hypertension appears to be well controlled on current dosages of furosemide, spironolactone and valsartan.  Tolerating well.    Hyperlipidemia  well controlled with lovastatin, last LDL 66, triglycerides 113, HDL 47 in total cholesterol 13      Past Medical History:   Diagnosis Date    Allergy     Angio-edema     Arthritis     Cataract     bilateral - not removed    Cerebrovascular malformation     Cirrhosis 11/24/2020    Colon polyps     Diabetes mellitus, type 2     Diabetic peripheral neuropathy     Edema of both feet 8/19/2022    GERD (gastroesophageal reflux disease)     Herpes infection     Hypothyroidism     Kidney stones     Mild nonproliferative diabetic retinopathy of 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 edema associated with type 2 diabetes mellitus 4/18/2019    DEL RIO (nonalcoholic steatohepatitis) 8/19/2022    Nausea & vomiting 11/23/2015    Obesity, morbid     Ovarian cyst     Seizure disorder, focal motor     Sleep apnea     Type II or unspecified type diabetes mellitus with neurological manifestations, uncontrolled(250.62)     Urticaria        Review of patient's allergies indicates:   Allergen Reactions    Sulfa (sulfonamide antibiotics) Nausea Only    Ciprofloxacin     Januvia [sitagliptin]      abd pain    Lisinopril     Lotensin [benazepril]     Nexium [esomeprazole magnesium] Other (See Comments)     Gas         Current Outpatient Medications:     ascorbic acid, vitamin C, (VITAMIN C) 100 MG tablet, Take 500 mg by  mouth 2 (two) times daily. , Disp: , Rfl:     aspirin (ECOTRIN) 81 MG EC tablet, Take 81 mg by mouth once daily., Disp: , Rfl:     azelastine (ASTELIN) 137 mcg (0.1 %) nasal spray, 2 sprays (274 mcg total) by Nasal route 2 (two) times daily., Disp: 120 mL, Rfl: 4    blood sugar diagnostic (TRUE METRIX GLUCOSE TEST STRIP) Strp, TEST TWO TIMES DAILY, Disp: 200 strip, Rfl: 6    blood-glucose meter [facility], [employer] True Metrix Air meter, Disp: 1 each, Rfl: 0    calcium carbonate (CALCIUM 500 ORAL), Take 1,000 mg by mouth., Disp: , Rfl:     calcium carbonate-vitamin D3 600 mg(1,500mg) -100 unit Cap, Take 1 tablet by mouth once daily., Disp: , Rfl:     estradioL (ESTRING) 2 mg (7.5 mcg /24 hour) vaginal ring, Remove old Estring, place the new one every 3 months., Disp: 1 each, Rfl: 3    fluticasone propionate (FLONASE) 50 mcg/actuation nasal spray, 2 sprays (100 mcg total) by Each Nostril route once daily., Disp: 48 g, Rfl: 4    furosemide (LASIX) 20 MG tablet, Take 1 tablet (20 mg total) by mouth once daily., Disp: 30 tablet, Rfl: 11    gabapentin (NEURONTIN) 600 MG tablet, Take by mouth., Disp: , Rfl:     glipiZIDE (GLUCOTROL) 5 MG tablet, TAKE 1 TABLET TWICE DAILY BEFORE A MEAL, Disp: 180 tablet, Rfl: 0    ipratropium (ATROVENT) 42 mcg (0.06 %) nasal spray, 2 sprays by Each Nostril route 4 (four) times daily., Disp: 45 mL, Rfl: 4    ketotifen (ZADITOR) 0.025 % (0.035 %) ophthalmic solution, Place 1-2 drops into both eyes once daily., Disp: , Rfl:     L.acid-B.bifidum-B.animal-FOS 25 billion cell -100 mg Cap, Take 1 capsule by mouth once daily., Disp: , Rfl:     lancets (TRUEPLUS LANCETS) 28 gauge Mis, Inject 1 lancet into the skin 2 (two) times daily before meals., Disp: 200 each, Rfl: 6    levothyroxine (SYNTHROID) 75 MCG tablet, TAKE 1 TABLET EVERY DAY, Disp: 90 tablet, Rfl: 3    loratadine (CLARITIN) 10 mg tablet, Take 10 mg by mouth once daily., Disp: , Rfl:     lovastatin (MEVACOR) 40 MG tablet, TAKE 1 TABLET NIGHTLY  "(SUBSTITUTED FOR MEVACOR), Disp: 90 tablet, Rfl: 1    metFORMIN (GLUCOPHAGE) 1000 MG tablet, TAKE 1 TABLET TWICE DAILY WITH MEALS, Disp: 180 tablet, Rfl: 1    mv-mn/folic acid/vit K/cgel252 (ALIVE ONCE DAILY WOMEN 50 PLUS ORAL), Take 1 tablet by mouth once daily., Disp: , Rfl:     omeprazole (PRILOSEC) 20 MG capsule, Take 1 capsule (20 mg total) by mouth daily as needed (acid reflux)., Disp: 90 capsule, Rfl: 3    pen needle, diabetic (BD ULTRA-FINE EDUARDO PEN NEEDLE) 32 gauge x 5/32" Ndle, USE AS DIRECTED, Disp: 200 each, Rfl: 6    pregabalin (LYRICA) 75 MG capsule, Take 1 capsule (75 mg total) by mouth 2 (two) times daily., Disp: 60 capsule, Rfl: 6    semaglutide (OZEMPIC) 0.25 mg or 0.5 mg(2 mg/1.5 mL) pen injector, Inject 0.5 mg into the skin every 7 days., Disp: 3 pen, Rfl: 4    spironolactone (ALDACTONE) 50 MG tablet, Take 1 tablet (50 mg total) by mouth once daily., Disp: 30 tablet, Rfl: 11    TRESIBA FLEXTOUCH U-100 100 unit/mL (3 mL) insulin pen, INJECT 38 UNITS INTO THE SKIN EVERY EVENING. (Patient taking differently: INJECT 25 UNITS INTO THE SKIN EVERY EVENING.), Disp: 45 mL, Rfl: 2    UNABLE TO FIND, Take 1 tablet by mouth 2 (two) times a day. medication name: Magnesium 400mg, Calcium 1000 mg, D3 15mcg, Zinc 15mg, Disp: , Rfl:     valACYclovir (VALTREX) 500 MG tablet, TAKE 1 TABLET EVERY DAY, Disp: 90 tablet, Rfl: 3    valsartan (DIOVAN) 160 MG tablet, TAKE 1 TABLET TWICE DAILY (Patient taking differently: Take by mouth once daily.), Disp: 180 tablet, Rfl: 2     Objective:   Review of Systems   Cardiovascular:  Negative for chest pain, claudication, cyanosis, dyspnea on exertion, irregular heartbeat, leg swelling, near-syncope, orthopnea, palpitations, paroxysmal nocturnal dyspnea and syncope.         Vitals:    08/03/23 1307   BP: 115/85   Pulse: (!) 52     Wt Readings from Last 3 Encounters:   08/03/23 111.6 kg (246 lb 0.5 oz)   08/01/23 111 kg (244 lb 11.4 oz)   06/08/23 109.9 kg (242 lb 4.6 oz)     Temp " Readings from Last 3 Encounters:   03/10/23 97.7 °F (36.5 °C) (Skin)   03/01/23 97.9 °F (36.6 °C) (Oral)   09/09/22 97.6 °F (36.4 °C)     BP Readings from Last 3 Encounters:   08/03/23 115/85   08/01/23 (!) 117/49   06/08/23 122/62     Pulse Readings from Last 3 Encounters:   08/03/23 (!) 52   08/01/23 (!) 56   06/08/23 78             Physical Exam  Vitals reviewed.   Constitutional:       General: She is not in acute distress.     Appearance: She is well-developed.   HENT:      Head: Normocephalic and atraumatic.      Nose: Nose normal.   Eyes:      Conjunctiva/sclera: Conjunctivae normal.      Pupils: Pupils are equal, round, and reactive to light.   Neck:      Vascular: No carotid bruit or JVD.   Cardiovascular:      Rate and Rhythm: Normal rate and regular rhythm.      Pulses: Intact distal pulses.      Heart sounds: Normal heart sounds. No murmur heard.     No friction rub. No gallop.   Pulmonary:      Effort: Pulmonary effort is normal. No respiratory distress.      Breath sounds: Normal breath sounds. No wheezing or rales.   Chest:      Chest wall: No tenderness.   Abdominal:      General: Bowel sounds are normal. There is no distension.      Palpations: Abdomen is soft.      Tenderness: There is no abdominal tenderness.   Musculoskeletal:         General: No tenderness or deformity. Normal range of motion.      Cervical back: Normal range of motion and neck supple.      Right lower leg: No edema.      Left lower leg: No edema.   Skin:     General: Skin is warm and dry.      Findings: No erythema or rash.   Neurological:      Mental Status: She is alert and oriented to person, place, and time.      Cranial Nerves: No cranial nerve deficit.      Motor: No abnormal muscle tone.      Coordination: Coordination normal.   Psychiatric:         Behavior: Behavior normal.         Thought Content: Thought content normal.         Judgment: Judgment normal.           Lab Results   Component Value Date    CHOL 136  02/09/2023    CHOL 143 02/07/2022    CHOL 130 07/23/2021     Lab Results   Component Value Date    HDL 47 02/09/2023    HDL 52 02/07/2022    HDL 47 07/23/2021     Lab Results   Component Value Date    LDLCALC 66.4 02/09/2023    LDLCALC 67.2 02/07/2022    LDLCALC 66.0 07/23/2021     Lab Results   Component Value Date    ALT 19 02/09/2023    ALT 19 02/09/2023    AST 24 02/09/2023    AST 24 02/09/2023    AST 31 01/09/2023     Lab Results   Component Value Date    CREATININE 1.0 02/09/2023    CREATININE 1.0 02/09/2023    BUN 16 02/09/2023    BUN 16 02/09/2023     02/09/2023     02/09/2023    K 4.5 02/09/2023    K 4.5 02/09/2023    CO2 25 02/09/2023    CO2 25 02/09/2023    CO2 25 01/09/2023     Lab Results   Component Value Date    HGB 13.1 02/09/2023    HCT 42.2 02/09/2023    HCT 40.8 01/09/2023    HCT 40.9 12/05/2022                         Assessment and Plan:     Coronary artery disease involving native coronary artery of native heart without angina pectoris  Comments:  Continues to be free of chest pain    Bilateral carotid artery stenosis  Comments:  Currently asymptomatic    Atrial fibrillation, unspecified type  Comments:  Not recurrent, seen by EP, no indication for anticoagulation at present    Essential hypertension  Comments:  Well controlled    Pure hypercholesterolemia  Comments:  Excellent on moderate dose statin therapy    Abdominal aortic atherosclerosis  Comments:  Optimized blood pressure and lipid control         Follow up as needed.          Future Appointments   Date Time Provider Department Center   8/17/2023  9:15 AM LAB, [facility] [place]   8/24/2023  9:30 AM INJECTION, ALLERGY [facility] RACHELLE [place]   8/24/2023  2:00 PM Aislinn Magallon MD [facility]   9/20/2023 10:10 AM LAB [facility] [place]   9/20/2023  1:00 PM Lyndon Ortiz MD [facility] [place]   9/27/2023 10:30 AM Nell Christianson MD [facility] [place]   9/27/2023  2:00 PM  Lyndon Ortiz MD [facility] [place]   10/4/2023  8:00 AM Lyndon Ortiz MD [facility] [place]   10/11/2023  2:00 PM Lyndon Ortiz MD [facility] [place]   11/1/2023  2:30 PM Lyndon Ortiz MD [facility] [place]   11/7/2023 11:30 AM Juanita Tony MD [facility]

## 2023-08-03 NOTE — PATIENT INSTRUCTIONS
"I recommend the book, "The Obesity Code" for weight loss; it recommends intermittent fasting and avoidance of sugar, artificial sweeteners and refined carbohydrates.    Also, here is information on a Mediterranean type diet including fish, the pesco-mediterranean diet from the American College of Cardiology:    1.  Humans are evolutionarily adapted to obtain calories and nutrients from both plant and animal food sources. Many people overconsume animal products, often-processed meats high in saturated fats and chemical additives. In contrast, while strict veganism has gained popularity for many reasons and has value in certain groups, it can cause nutritional deficiencies (vitamin B12, high-quality proteins, iron, zinc, omega-3 fatty acid, vitamin D, and calcium), and predispose to osteopenia, loss of muscle mass, and anemia. This is not true of a lacto-ovo vegetarian diet, which allows no animal-based food except for eggs and dairy. A 6-year study of [id number] [place] [employer] reported a decreased incidence of all-cause mortality when comparing vegetarians with nonvegetarians. However, when the vegetarians were stratified into vegans, lacto-ovo vegetarians, pesco-vegetarians, and semi-vegetarians, the pesco-vegetarians had lowest risks for all-cause mortality, cardiovascular disease (CVD) mortality, and mortality from other causes.     2.  The authors propose a plant-rich diet rich in nuts with fish and seafood as the principle source of animal food. Known as the Pesco-Mediterranean diet, it is supplemented with extra-virgin olive oil (EVOO), which is the principle fat source, along with moderate amounts of dairy (particularly yogurt and cheese) and eggs, as well as modest amounts of alcohol consumption (ideally red wine with the evening meal), but few red and processed meats.     3.  Both epidemiological studies and randomized clinical trials indicate that the traditional Mediterranean diet is associated with " lower risks for all-cause and CVD mortality, coronary heart disease, metabolic syndrome, diabetes, cognitive decline, neurodegenerative diseases (including Alzheimers), depression, overall cancer mortality, and breast and colorectal cancers.     4.  The traditional Mediterranean diet has been endorsed in the most recent Dietary Guidelines for Americans and the American College of Cardiology/American Heart Association guidelines. The 2020 U.[employer]. News & World Report ranked it #1 for overall health based upon it being nutritious, safe, relatively easy to follow, protective against CVD and diabetes, and effective for weight loss.     5.  Fish and seafood are important sources of vitamins protein and omega-3 fatty acids, of which the higher blood and adipose tissue are associated with reduced fatal and nonfatal myocardial infarction. When not fried, fish consumption has been associated with reduced risk of heart failure, and the incidence of the metabolic syndrome, coronary heart disease, ischemic stroke, and sudden cardiac death, particularly when seafood replaces less healthy foods.     6.  Unrestricted use of olive oil in the kitchen, on salads (with vinegar), cooking vegetables, and at the table is the foundation of the traditional Mediterranean diet, although olive oil quality is crucial, which makes it expensive. EVOO retains hydrophilic components of olives including highly bioactive polyphenols, which are believed to underlie many of EVOOs cardiometabolic benefits, such as reduced low-density lipoprotein cholesterol (LDL-C) and increased high-density lipoprotein cholesterol (HDL-C), improved vascular reactivity, enhanced HDL particle functionality, and a lower diabetes risk.     7.  Tree nuts, an integral component of the traditional Mediterranean diet, are nutrient dense rich in unsaturated fats, fiber, protein, polyphenols, phytosterols, and tocopherols. Nut consumption is associated with decreased incidence  and mortality rates from both CVD and coronary artery disease (CAD), as well as atrial fibrillation and diabetes. Randomized controlled trials have shown that diets enriched with nuts produce cardiometabolic benefits including improvements in insulin sensitivity, LDL-C, inflammation, and vascular reactivity. A 1-daily serving of mixed nuts resulted in a 28% reduction in CVD risk. Generous intake of nuts does not promote weight gain because of increased satiety and incomplete digestion.     8.  Legumes are an excellent source of vegetable protein, folate, and magnesium and fiber, and like other seeds including peanuts, are rich in polyphenols. Consumption of legumes has been linked to a reduced risk of incident and fatal CVD and CAD, as well as improvements in blood glucose, cholesterol, blood pressure, and body weight. Legumes, like fish, are a satiating and healthy substitute for red meat and processed meats.     9.  Dairy products and eggs are important sources of protein, nonsodium minerals, probiotics, and vitamin D. Although there is no clear consensus among nutrition experts on the role of dairy products in CVD risk, they are allowed in this Pesco-Mediterranean diet. Fermented low-fat versions, such as yogurt and soft cheeses, are preferred; butter and hard cheese are high in saturated fats and salt.     10.  Eggs are composed of beneficial nutrients including all essential amino acids, in addition to minerals (selenium, phosphorus, iodine, zinc), vitamins (A, D, B2, B12, niacin), and carotenoids (lutein, zeaxanthin). Although each yolk contains about 184 mg of dietary cholesterol, large prospective cohorts suggest that egg consumption is unrelated to serum cholesterol and does not increase CVD risk. Eggs are allowed in the Pesco-Mediterranean diet; egg whites are unlimited and preferably no more than 5 yolks/week.     11.  Whole grains, such as barley, whole oats, rye, corn, buckwheat, brown rice, and quinoa,  are an integral part of the traditional Mediterranean diet. Pasta is an example of a starchy food that has a low glycemic index despite being a refined carbohydrate. In the context of a low glycemic index dietary pattern such as the Mediterranean diet, pasta does not adversely affect adiposity and may even help reduce body weight and there is no evidence that pasta promotes cardiometabolic risk factors. White rice is associated with type 2 diabetes mellitus in Asians but not in Western cohorts, possibly because it is cooked and served plain in [place] and in Western cultures cooked in mixed dishes with vegetables and vegetable oil including EVOO.     12.  The staple beverage of the Pesco-Mediterranean diet is water--which can be flavored but not sweetened. Unsweetened tea and coffee are rich in antioxidants and are associated with improved CVD outcomes. If alcohol is consumed at all, dry red wine is recommended, with the ideal amount being a single glass (6 oz) for women and 1 or 2 glasses/day for men (6-12 oz) consumed with meals.     13.  Time-restricted eating, a type of intermittent fasting, is the practice of limiting the daily intake of calories to a window of time usually between 6-12 hours each day. Intermittent fasting when done on a regular basis has been shown to decrease intra-abdominal adipose tissue and reduce free-radical production. This elicits powerful cellular responses that improve glucose metabolism and reduce systemic inflammation, and may also reduce risks of diabetes, CVD, cancer, and neurodegenerative diseases. After a 12-hour overnight fast, insulin levels are typically low, and glycogen stores have been depleted. In this fasted state, the body starts mobilizing fatty acids from adipose cells to burn as metabolic fuel instead of glucose. This improves insulin sensitivity. Time-restricted eating is not more effective for weight loss than standard calorie-restriction, but appears to enhance CV  health even in nonobese people. Fasting may also lower blood pressure and resting heart rate and improve autonomic balance with augmented heart rate variability.     14.  The evidence regarding time-restricted eating is mostly based on animal models and observational human studies. The most popular form of time-restricted eating involves eating two rather than three meals and compressing the calorie-consumption window. No head-to-head studies have been performed to assess the optimal time window.

## 2023-08-06 DIAGNOSIS — H25.13 NUCLEAR SCLEROTIC CATARACT OF BOTH EYES: Primary | ICD-10-CM

## 2023-08-06 RX ORDER — KETOROLAC TROMETHAMINE 5 MG/ML
1 SOLUTION OPHTHALMIC 4 TIMES DAILY
Qty: 5 ML | Refills: 1 | Status: SHIPPED | OUTPATIENT
Start: 2023-09-16 | End: 2023-10-11 | Stop reason: SDUPTHER

## 2023-08-06 RX ORDER — PREDNISOLONE ACETATE 10 MG/ML
1 SUSPENSION/ DROPS OPHTHALMIC 4 TIMES DAILY
Qty: 5 ML | Refills: 1 | Status: SHIPPED | OUTPATIENT
Start: 2023-09-19 | End: 2023-11-07

## 2023-08-06 RX ORDER — OFLOXACIN 3 MG/ML
1 SOLUTION/ DROPS OPHTHALMIC 4 TIMES DAILY
Qty: 5 ML | Refills: 1 | Status: SHIPPED | OUTPATIENT
Start: 2023-09-16 | End: 2023-09-27

## 2023-08-08 ENCOUNTER — TELEPHONE (OUTPATIENT)
Dept: ALLERGY | Facility: CLINIC | Age: 73
End: 2023-08-08
Payer: MEDICARE

## 2023-08-08 NOTE — TELEPHONE ENCOUNTER
----- Message from Ailin Mills MD sent at 8/1/2023  8:44 AM CDT -----  Pt seen for her annual, please order new vial

## 2023-08-12 ENCOUNTER — HOSPITAL ENCOUNTER (OUTPATIENT)
Dept: RADIOLOGY | Facility: HOSPITAL | Age: 73
Discharge: HOME | End: 2023-08-12
Attending: HOSPITALIST
Payer: MEDICARE

## 2023-08-12 ENCOUNTER — OFFICE VISIT (OUTPATIENT)
Dept: INTERNAL MEDICINE | Facility: CLINIC | Age: 73
End: 2023-08-12
Payer: MEDICARE

## 2023-08-12 VITALS
TEMPERATURE: 97 F | WEIGHT: 244.69 LBS | BODY MASS INDEX: 45.03 KG/M2 | DIASTOLIC BLOOD PRESSURE: 50 MMHG | HEIGHT: 62 IN | HEART RATE: 63 BPM | SYSTOLIC BLOOD PRESSURE: 120 MMHG | OXYGEN SATURATION: 97 % | RESPIRATION RATE: 18 BRPM

## 2023-08-12 DIAGNOSIS — R10.9 LEFT FLANK PAIN: ICD-10-CM

## 2023-08-12 DIAGNOSIS — R10.9 LEFT SIDED ABDOMINAL PAIN: Primary | ICD-10-CM

## 2023-08-12 DIAGNOSIS — R10.9 LEFT SIDED ABDOMINAL PAIN: ICD-10-CM

## 2023-08-12 PROCEDURE — 1101F PT FALLS ASSESS-DOCD LE1/YR: CPT | Mod: HCNC,CPTII,S$GLB, | Performed by: HOSPITALIST

## 2023-08-12 PROCEDURE — 3044F HG A1C LEVEL LT 7.0%: CPT | Mod: HCNC,CPTII,S$GLB, | Performed by: HOSPITALIST

## 2023-08-12 PROCEDURE — 3074F SYST BP LT 130 MM HG: CPT | Mod: HCNC,CPTII,S$GLB, | Performed by: HOSPITALIST

## 2023-08-12 PROCEDURE — 3288F PR FALLS RISK ASSESSMENT DOCUMENTED: ICD-10-PCS | Mod: HCNC,CPTII,S$GLB, | Performed by: HOSPITALIST

## 2023-08-12 PROCEDURE — 4010F PR ACE/ARB THEARPY RXD/TAKEN: ICD-10-PCS | Mod: HCNC,CPTII,S$GLB, | Performed by: HOSPITALIST

## 2023-08-12 PROCEDURE — 1159F PR MEDICATION LIST DOCUMENTED IN MEDICAL RECORD: ICD-10-PCS | Mod: HCNC,CPTII,S$GLB, | Performed by: HOSPITALIST

## 2023-08-12 PROCEDURE — 3078F PR MOST RECENT DIASTOLIC BLOOD PRESSURE < 80 MM HG: ICD-10-PCS | Mod: HCNC,CPTII,S$GLB, | Performed by: HOSPITALIST

## 2023-08-12 PROCEDURE — 1125F PR PAIN SEVERITY QUANTIFIED, PAIN PRESENT: ICD-10-PCS | Mod: HCNC,CPTII,S$GLB, | Performed by: HOSPITALIST

## 2023-08-12 PROCEDURE — 3008F PR BODY MASS INDEX (BMI) DOCUMENTED: ICD-10-PCS | Mod: HCNC,CPTII,S$GLB, | Performed by: HOSPITALIST

## 2023-08-12 PROCEDURE — 3044F PR MOST RECENT HEMOGLOBIN A1C LEVEL <7.0%: ICD-10-PCS | Mod: HCNC,CPTII,S$GLB, | Performed by: HOSPITALIST

## 2023-08-12 PROCEDURE — 1160F PR REVIEW ALL MEDS BY PRESCRIBER/CLIN PHARMACIST DOCUMENTED: ICD-10-PCS | Mod: HCNC,CPTII,S$GLB, | Performed by: HOSPITALIST

## 2023-08-12 PROCEDURE — 74018 XR ABDOMEN AP 1 VIEW: ICD-10-PCS | Mod: 26,HCNC,, | Performed by: RADIOLOGY

## 2023-08-12 PROCEDURE — 99214 PR OFFICE/OUTPT VISIT, EST, LEVL IV, 30-39 MIN: ICD-10-PCS | Mod: HCNC,S$GLB,, | Performed by: HOSPITALIST

## 2023-08-12 PROCEDURE — 3078F DIAST BP <80 MM HG: CPT | Mod: HCNC,CPTII,S$GLB, | Performed by: HOSPITALIST

## 2023-08-12 PROCEDURE — 99999 PR PBB SHADOW E&M-EST. PATIENT-LVL V: ICD-10-PCS | Mod: PBBFAC,HCNC,, | Performed by: HOSPITALIST

## 2023-08-12 PROCEDURE — 1159F MED LIST DOCD IN RCRD: CPT | Mod: HCNC,CPTII,S$GLB, | Performed by: HOSPITALIST

## 2023-08-12 PROCEDURE — 74018 RADEX ABDOMEN 1 VIEW: CPT | Mod: TC,HCNC,PO

## 2023-08-12 PROCEDURE — 1160F RVW MEDS BY RX/DR IN RCRD: CPT | Mod: HCNC,CPTII,S$GLB, | Performed by: HOSPITALIST

## 2023-08-12 PROCEDURE — 74018 RADEX ABDOMEN 1 VIEW: CPT | Mod: 26,HCNC,, | Performed by: RADIOLOGY

## 2023-08-12 PROCEDURE — 1125F AMNT PAIN NOTED PAIN PRSNT: CPT | Mod: HCNC,CPTII,S$GLB, | Performed by: HOSPITALIST

## 2023-08-12 PROCEDURE — 1157F PR ADVANCE CARE PLAN OR EQUIV PRESENT IN MEDICAL RECORD: ICD-10-PCS | Mod: HCNC,CPTII,S$GLB, | Performed by: HOSPITALIST

## 2023-08-12 PROCEDURE — 3074F PR MOST RECENT SYSTOLIC BLOOD PRESSURE < 130 MM HG: ICD-10-PCS | Mod: HCNC,CPTII,S$GLB, | Performed by: HOSPITALIST

## 2023-08-12 PROCEDURE — 4010F ACE/ARB THERAPY RXD/TAKEN: CPT | Mod: HCNC,CPTII,S$GLB, | Performed by: HOSPITALIST

## 2023-08-12 PROCEDURE — 99214 OFFICE O/P EST MOD 30 MIN: CPT | Mod: HCNC,S$GLB,, | Performed by: HOSPITALIST

## 2023-08-12 PROCEDURE — 1157F ADVNC CARE PLAN IN RCRD: CPT | Mod: HCNC,CPTII,S$GLB, | Performed by: HOSPITALIST

## 2023-08-12 PROCEDURE — 1101F PR PT FALLS ASSESS DOC 0-1 FALLS W/OUT INJ PAST YR: ICD-10-PCS | Mod: HCNC,CPTII,S$GLB, | Performed by: HOSPITALIST

## 2023-08-12 PROCEDURE — 3288F FALL RISK ASSESSMENT DOCD: CPT | Mod: HCNC,CPTII,S$GLB, | Performed by: HOSPITALIST

## 2023-08-12 PROCEDURE — 3008F BODY MASS INDEX DOCD: CPT | Mod: HCNC,CPTII,S$GLB, | Performed by: HOSPITALIST

## 2023-08-12 PROCEDURE — 99999 PR PBB SHADOW E&M-EST. PATIENT-LVL V: CPT | Mod: PBBFAC,HCNC,, | Performed by: HOSPITALIST

## 2023-08-12 RX ORDER — TRAMADOL HYDROCHLORIDE 50 MG/1
50 TABLET ORAL EVERY 8 HOURS PRN
Qty: 21 TABLET | Refills: 0 | Status: SHIPPED | OUTPATIENT
Start: 2023-08-12

## 2023-08-12 NOTE — PROGRESS NOTES
Subjective:     @Patient ID: Vivienne Jose is a 73 y.o. female.    Chief Complaint: Back Pain (lower) and Hip Pain (Left and right)    HPI  74 yo F presents for urgent visit with c/o left sided back pain/ hip pain/groin pain x 4 days   Pt has hx of sciatica and lumbar degeneration   Reports pain feels like dull   Can become severe. Denies blood in urine. Denies dysuria.   Has not taken anything for the pain  Reports had UTI  Reports hx of kidney stone     Denies falls/injuries     Review of Systems   Constitutional:  Negative for chills and fever.   Gastrointestinal:  Positive for abdominal pain. Negative for constipation.   Genitourinary:  Negative for dysuria and hematuria.   Musculoskeletal:  Positive for back pain.   Psychiatric/Behavioral:  Negative for agitation and confusion.      Past medical history, surgical history, and family medical history reviewed and updated as appropriate.    Medications and allergies reviewed.     Objective:     There were no vitals filed for this visit.  There is no height or weight on file to calculate BMI.  Physical Exam  Constitutional:       Appearance: Normal appearance.   HENT:      Head: Normocephalic and atraumatic.      Mouth/Throat:      Pharynx: No oropharyngeal exudate.   Eyes:      Conjunctiva/sclera: Conjunctivae normal.   Pulmonary:      Effort: Pulmonary effort is normal.   Abdominal:      General: Bowel sounds are normal.      Palpations: Abdomen is soft.      Tenderness: There is abdominal tenderness (left mid- lower quadrant). There is no right CVA tenderness, left CVA tenderness or guarding.   Musculoskeletal:      Cervical back: Normal range of motion and neck supple.   Skin:     General: Skin is warm and dry.   Neurological:      Mental Status: She is alert and oriented to person, place, and time.   Psychiatric:         Mood and Affect: Mood normal.         Behavior: Behavior normal.       Lab Results   Component Value Date    WBC 8.18 02/09/2023    HGB 13.1  02/09/2023    HCT 42.2 02/09/2023     02/09/2023    CHOL 136 02/09/2023    TRIG 113 02/09/2023    HDL 47 02/09/2023    ALT 19 02/09/2023    ALT 19 02/09/2023    AST 24 02/09/2023    AST 24 02/09/2023     02/09/2023     02/09/2023    K 4.5 02/09/2023    K 4.5 02/09/2023     02/09/2023     02/09/2023    CREATININE 1.0 02/09/2023    CREATININE 1.0 02/09/2023    BUN 16 02/09/2023    BUN 16 02/09/2023    CO2 25 02/09/2023    CO2 25 02/09/2023    TSH 1.799 02/09/2023    INR 1.0 02/09/2023    HGBA1C 6.3 (H) 02/09/2023       Assessment:     1. Left sided abdominal pain    2. Left flank pain      Plan:   Vivienne was seen today for back pain and hip pain.    Diagnoses and all orders for this visit:    Left sided abdominal pain  -     X-Ray Abdomen AP 1 View; Future  -     Urine culture; Future  -     Urinalysis; Future    Left flank pain  -     X-Ray Abdomen AP 1 View; Future  -     Urine culture; Future  -     Urinalysis; Future    Other orders  -     traMADoL (ULTRAM) 50 mg tablet; Take 1 tablet (50 mg total) by mouth every 8 (eight) hours as needed for Pain.           Linda Hutchins MD  Internal Medicine    8/12/2023

## 2023-08-15 RX ORDER — NITROFURANTOIN 25; 75 MG/1; MG/1
100 CAPSULE ORAL 2 TIMES DAILY
Qty: 20 CAPSULE | Refills: 0 | Status: ON HOLD | OUTPATIENT
Start: 2023-08-15 | End: 2023-09-19

## 2023-08-16 RX ORDER — SEMAGLUTIDE 0.68 MG/ML
INJECTION, SOLUTION SUBCUTANEOUS
Qty: 9 EACH | Refills: 0 | Status: SHIPPED | OUTPATIENT
Start: 2023-08-16 | End: 2024-03-13

## 2023-08-16 NOTE — TELEPHONE ENCOUNTER
Refill Routing Note     Refill Routing Note   Medication(s) are not appropriate for processing by [facility] for the following reason(s):      Required labs outdated    [facility] action(s):  Defer Care Due:  None identified     Medication Therapy Plan: FLOS 8/17/23      Appointments  past 12m or future 3m with PCP    Date Provider   Last Visit   2/20/2023 Aislinn Magallon MD   Next Visit   8/24/2023 Aislinn Magallon MD   ED visits in past 90 days: 0        Note composed:6:57 AM 08/16/2023

## 2023-08-16 NOTE — TELEPHONE ENCOUNTER
Care Due:                  Date            Visit Type   Department     Provider  --------------------------------------------------------------------------------                                EP -                              PRIMARY      [facility] INTERNAL  Last Visit: 02-      CARE ([facility])   MEDICINE       Aislinn Magallon                              [facility]                              PRIMARY      [facility] INTERNAL  Next Visit: 08-      CARE ([facility])   MEDICINE       Aislinn Magallon                                                            Last  Test          Frequency    Reason                     Performed    Due Date  --------------------------------------------------------------------------------    HBA1C.......  6 months...  TRESIBA, glipiZIDE,        02- 08-                             metFORMIN, semaglutide...    [facility] Embedded Care Due Messages. Reference number: [id number].   8/16/2023 2:03:30 AM CDT

## 2023-08-17 ENCOUNTER — LAB VISIT (OUTPATIENT)
Dept: LAB | Facility: HOSPITAL | Age: 73
End: 2023-08-17
Attending: INTERNAL MEDICINE
Payer: MEDICARE

## 2023-08-17 DIAGNOSIS — N18.31 TYPE 2 DIABETES MELLITUS WITH STAGE 3A CHRONIC KIDNEY DISEASE, WITH LONG-TERM CURRENT USE OF INSULIN: ICD-10-CM

## 2023-08-17 DIAGNOSIS — Z79.4 TYPE 2 DIABETES MELLITUS WITH STAGE 3A CHRONIC KIDNEY DISEASE, WITH LONG-TERM CURRENT USE OF INSULIN: ICD-10-CM

## 2023-08-17 DIAGNOSIS — E66.01 SEVERE OBESITY (BMI >= 40): ICD-10-CM

## 2023-08-17 DIAGNOSIS — E11.22 TYPE 2 DIABETES MELLITUS WITH STAGE 3A CHRONIC KIDNEY DISEASE, WITH LONG-TERM CURRENT USE OF INSULIN: ICD-10-CM

## 2023-08-17 LAB
ALBUMIN SERPL BCP-MCNC: 3.4 G/DL (ref 3.5–5.2)
ALP SERPL-CCNC: 71 U/L (ref 55–135)
ALT SERPL W/O P-5'-P-CCNC: 17 U/L (ref 10–44)
ANION GAP SERPL CALC-SCNC: 11 MMOL/L (ref 8–16)
AST SERPL-CCNC: 23 U/L (ref 10–40)
BASOPHILS # BLD AUTO: 0.04 K/UL (ref 0–0.2)
BASOPHILS NFR BLD: 0.4 % (ref 0–1.9)
BILIRUB SERPL-MCNC: 0.4 MG/DL (ref 0.1–1)
BUN SERPL-MCNC: 18 MG/DL (ref 8–23)
CALCIUM SERPL-MCNC: 9.9 MG/DL (ref 8.7–10.5)
CHLORIDE SERPL-SCNC: 105 MMOL/L (ref 95–110)
CHOLEST SERPL-MCNC: 130 MG/DL (ref 120–199)
CHOLEST/HDLC SERPL: 3.3 {RATIO} (ref 2–5)
CO2 SERPL-SCNC: 27 MMOL/L (ref 23–29)
CREAT SERPL-MCNC: 1.1 MG/DL (ref 0.5–1.4)
DIFFERENTIAL METHOD: ABNORMAL
EOSINOPHIL # BLD AUTO: 0.2 K/UL (ref 0–0.5)
EOSINOPHIL NFR BLD: 1.7 % (ref 0–8)
ERYTHROCYTE [DISTWIDTH] IN BLOOD BY AUTOMATED COUNT: 14 % (ref 11.5–14.5)
EST. GFR  (NO RACE VARIABLE): 53.4 ML/MIN/1.73 M^2
ESTIMATED AVG GLUCOSE: 146 MG/DL (ref 68–131)
GLUCOSE SERPL-MCNC: 66 MG/DL (ref 70–110)
HBA1C MFR BLD: 6.7 % (ref 4–5.6)
HCT VFR BLD AUTO: 41.8 % (ref 37–48.5)
HDLC SERPL-MCNC: 40 MG/DL (ref 40–75)
HDLC SERPL: 30.8 % (ref 20–50)
HGB BLD-MCNC: 13.2 G/DL (ref 12–16)
IMM GRANULOCYTES # BLD AUTO: 0.08 K/UL (ref 0–0.04)
IMM GRANULOCYTES NFR BLD AUTO: 0.8 % (ref 0–0.5)
LDLC SERPL CALC-MCNC: 63.4 MG/DL (ref 63–159)
LYMPHOCYTES # BLD AUTO: 1.9 K/UL (ref 1–4.8)
LYMPHOCYTES NFR BLD: 20.2 % (ref 18–48)
MCH RBC QN AUTO: 32 PG (ref 27–31)
MCHC RBC AUTO-ENTMCNC: 31.6 G/DL (ref 32–36)
MCV RBC AUTO: 102 FL (ref 82–98)
MONOCYTES # BLD AUTO: 0.8 K/UL (ref 0.3–1)
MONOCYTES NFR BLD: 8 % (ref 4–15)
NEUTROPHILS # BLD AUTO: 6.6 K/UL (ref 1.8–7.7)
NEUTROPHILS NFR BLD: 68.9 % (ref 38–73)
NONHDLC SERPL-MCNC: 90 MG/DL
NRBC BLD-RTO: 0 /100 WBC
PLATELET # BLD AUTO: 279 K/UL (ref 150–450)
PMV BLD AUTO: 10.3 FL (ref 9.2–12.9)
POTASSIUM SERPL-SCNC: 4.6 MMOL/L (ref 3.5–5.1)
PROT SERPL-MCNC: 7.6 G/DL (ref 6–8.4)
RBC # BLD AUTO: 4.12 M/UL (ref 4–5.4)
SODIUM SERPL-SCNC: 143 MMOL/L (ref 136–145)
TRIGL SERPL-MCNC: 133 MG/DL (ref 30–150)
WBC # BLD AUTO: 9.59 K/UL (ref 3.9–12.7)

## 2023-08-17 PROCEDURE — 83036 HEMOGLOBIN GLYCOSYLATED A1C: CPT | Mod: HCNC | Performed by: INTERNAL MEDICINE

## 2023-08-17 PROCEDURE — 80061 LIPID PANEL: CPT | Mod: HCNC | Performed by: INTERNAL MEDICINE

## 2023-08-17 PROCEDURE — 85025 COMPLETE CBC W/AUTO DIFF WBC: CPT | Mod: HCNC | Performed by: INTERNAL MEDICINE

## 2023-08-17 PROCEDURE — 36415 COLL VENOUS BLD VENIPUNCTURE: CPT | Mod: HCNC,PO | Performed by: INTERNAL MEDICINE

## 2023-08-17 PROCEDURE — 80053 COMPREHEN METABOLIC PANEL: CPT | Mod: HCNC | Performed by: INTERNAL MEDICINE

## 2023-08-24 ENCOUNTER — CLINICAL SUPPORT (OUTPATIENT)
Dept: ALLERGY | Facility: CLINIC | Age: 73
End: 2023-08-24
Payer: MEDICARE

## 2023-08-24 DIAGNOSIS — J30.9 CHRONIC ALLERGIC RHINITIS: Primary | ICD-10-CM

## 2023-08-24 PROCEDURE — 95115 PR IMMUNOTHERAPY, ONE INJECTION: ICD-10-PCS | Mod: HCNC,S$GLB,, | Performed by: STUDENT IN AN ORGANIZED HEALTH CARE EDUCATION/TRAINING PROGRAM

## 2023-08-24 PROCEDURE — 95115 IMMUNOTHERAPY ONE INJECTION: CPT | Mod: HCNC,S$GLB,, | Performed by: STUDENT IN AN ORGANIZED HEALTH CARE EDUCATION/TRAINING PROGRAM

## 2023-08-30 PROCEDURE — 95165 ANTIGEN THERAPY SERVICES: CPT | Mod: HCNC,S$GLB,, | Performed by: ALLERGY & IMMUNOLOGY

## 2023-08-30 PROCEDURE — 95165 PR PROFES SVC,IMMUNOTHER,SINGLE/MULT AGS: ICD-10-PCS | Mod: HCNC,S$GLB,, | Performed by: ALLERGY & IMMUNOLOGY

## 2023-09-12 ENCOUNTER — TELEPHONE (OUTPATIENT)
Dept: OPHTHALMOLOGY | Facility: CLINIC | Age: 73
End: 2023-09-12
Payer: MEDICARE

## 2023-09-13 ENCOUNTER — OFFICE VISIT (OUTPATIENT)
Dept: INTERNAL MEDICINE | Facility: CLINIC | Age: 73
End: 2023-09-13
Payer: MEDICARE

## 2023-09-13 VITALS
SYSTOLIC BLOOD PRESSURE: 122 MMHG | BODY MASS INDEX: 44.22 KG/M2 | OXYGEN SATURATION: 98 % | HEART RATE: 66 BPM | HEIGHT: 62 IN | WEIGHT: 240.31 LBS | DIASTOLIC BLOOD PRESSURE: 70 MMHG

## 2023-09-13 DIAGNOSIS — E11.59 HYPERTENSION ASSOCIATED WITH DIABETES: ICD-10-CM

## 2023-09-13 DIAGNOSIS — Z79.4 TYPE 2 DIABETES MELLITUS WITH STAGE 3A CHRONIC KIDNEY DISEASE, WITH LONG-TERM CURRENT USE OF INSULIN: ICD-10-CM

## 2023-09-13 DIAGNOSIS — M54.31 RIGHT SIDED SCIATICA: Primary | ICD-10-CM

## 2023-09-13 DIAGNOSIS — N18.31 TYPE 2 DIABETES MELLITUS WITH STAGE 3A CHRONIC KIDNEY DISEASE, WITH LONG-TERM CURRENT USE OF INSULIN: ICD-10-CM

## 2023-09-13 DIAGNOSIS — K52.9 GASTROENTERITIS: ICD-10-CM

## 2023-09-13 DIAGNOSIS — R53.83 FATIGUE, UNSPECIFIED TYPE: ICD-10-CM

## 2023-09-13 DIAGNOSIS — M54.9 DORSALGIA, UNSPECIFIED: ICD-10-CM

## 2023-09-13 DIAGNOSIS — I15.2 HYPERTENSION ASSOCIATED WITH DIABETES: ICD-10-CM

## 2023-09-13 DIAGNOSIS — E11.22 TYPE 2 DIABETES MELLITUS WITH STAGE 3A CHRONIC KIDNEY DISEASE, WITH LONG-TERM CURRENT USE OF INSULIN: ICD-10-CM

## 2023-09-13 DIAGNOSIS — Z12.31 SCREENING MAMMOGRAM FOR BREAST CANCER: ICD-10-CM

## 2023-09-13 PROCEDURE — 3044F PR MOST RECENT HEMOGLOBIN A1C LEVEL <7.0%: ICD-10-PCS | Mod: CPTII,S$GLB,, | Performed by: INTERNAL MEDICINE

## 2023-09-13 PROCEDURE — 3008F PR BODY MASS INDEX (BMI) DOCUMENTED: ICD-10-PCS | Mod: CPTII,S$GLB,, | Performed by: INTERNAL MEDICINE

## 2023-09-13 PROCEDURE — 99999 PR PBB SHADOW E&M-EST. PATIENT-LVL V: CPT | Mod: PBBFAC,,, | Performed by: INTERNAL MEDICINE

## 2023-09-13 PROCEDURE — 1157F ADVNC CARE PLAN IN RCRD: CPT | Mod: CPTII,S$GLB,, | Performed by: INTERNAL MEDICINE

## 2023-09-13 PROCEDURE — 3074F SYST BP LT 130 MM HG: CPT | Mod: CPTII,S$GLB,, | Performed by: INTERNAL MEDICINE

## 2023-09-13 PROCEDURE — 1159F MED LIST DOCD IN RCRD: CPT | Mod: CPTII,S$GLB,, | Performed by: INTERNAL MEDICINE

## 2023-09-13 PROCEDURE — 1160F PR REVIEW ALL MEDS BY PRESCRIBER/CLIN PHARMACIST DOCUMENTED: ICD-10-PCS | Mod: CPTII,S$GLB,, | Performed by: INTERNAL MEDICINE

## 2023-09-13 PROCEDURE — 99999 PR PBB SHADOW E&M-EST. PATIENT-LVL V: ICD-10-PCS | Mod: PBBFAC,,, | Performed by: INTERNAL MEDICINE

## 2023-09-13 PROCEDURE — 99215 OFFICE O/P EST HI 40 MIN: CPT | Mod: S$GLB,,, | Performed by: INTERNAL MEDICINE

## 2023-09-13 PROCEDURE — 1157F PR ADVANCE CARE PLAN OR EQUIV PRESENT IN MEDICAL RECORD: ICD-10-PCS | Mod: CPTII,S$GLB,, | Performed by: INTERNAL MEDICINE

## 2023-09-13 PROCEDURE — 1159F PR MEDICATION LIST DOCUMENTED IN MEDICAL RECORD: ICD-10-PCS | Mod: CPTII,S$GLB,, | Performed by: INTERNAL MEDICINE

## 2023-09-13 PROCEDURE — 99215 PR OFFICE/OUTPT VISIT, EST, LEVL V, 40-54 MIN: ICD-10-PCS | Mod: S$GLB,,, | Performed by: INTERNAL MEDICINE

## 2023-09-13 PROCEDURE — 4010F ACE/ARB THERAPY RXD/TAKEN: CPT | Mod: CPTII,S$GLB,, | Performed by: INTERNAL MEDICINE

## 2023-09-13 PROCEDURE — 1126F PR PAIN SEVERITY QUANTIFIED, NO PAIN PRESENT: ICD-10-PCS | Mod: CPTII,S$GLB,, | Performed by: INTERNAL MEDICINE

## 2023-09-13 PROCEDURE — 3078F PR MOST RECENT DIASTOLIC BLOOD PRESSURE < 80 MM HG: ICD-10-PCS | Mod: CPTII,S$GLB,, | Performed by: INTERNAL MEDICINE

## 2023-09-13 PROCEDURE — 3044F HG A1C LEVEL LT 7.0%: CPT | Mod: CPTII,S$GLB,, | Performed by: INTERNAL MEDICINE

## 2023-09-13 PROCEDURE — 1160F RVW MEDS BY RX/DR IN RCRD: CPT | Mod: CPTII,S$GLB,, | Performed by: INTERNAL MEDICINE

## 2023-09-13 PROCEDURE — 1126F AMNT PAIN NOTED NONE PRSNT: CPT | Mod: CPTII,S$GLB,, | Performed by: INTERNAL MEDICINE

## 2023-09-13 PROCEDURE — 3008F BODY MASS INDEX DOCD: CPT | Mod: CPTII,S$GLB,, | Performed by: INTERNAL MEDICINE

## 2023-09-13 PROCEDURE — 4010F PR ACE/ARB THEARPY RXD/TAKEN: ICD-10-PCS | Mod: CPTII,S$GLB,, | Performed by: INTERNAL MEDICINE

## 2023-09-13 PROCEDURE — 3074F PR MOST RECENT SYSTOLIC BLOOD PRESSURE < 130 MM HG: ICD-10-PCS | Mod: CPTII,S$GLB,, | Performed by: INTERNAL MEDICINE

## 2023-09-13 PROCEDURE — 3078F DIAST BP <80 MM HG: CPT | Mod: CPTII,S$GLB,, | Performed by: INTERNAL MEDICINE

## 2023-09-13 RX ORDER — GABAPENTIN 600 MG/1
600 TABLET ORAL 2 TIMES DAILY
Qty: 180 TABLET | Refills: 3 | Status: SHIPPED | OUTPATIENT
Start: 2023-09-13 | End: 2024-01-09 | Stop reason: SDUPTHER

## 2023-09-13 NOTE — PROGRESS NOTES
Patient ID: Vivienne Jose is a 73 y.o. female.    Chief Complaint: Diabetes    HPI Vivienne is a 73 y.o. female with  liver cirrhosis, hypertension, type 2 diabetes, coronary artery disease, hypothyroidism, sleep apnea, recurrent urinary tract infection and  afib who presents for routine follow-up of medical conditions. She presents with her  today.     She wants to change her lyrica back to gabapentin. Didn't like the way she felt with lyrica.   Complains of pain located in right hip, which goes across lower back and down legs.     Lares ill on Sunday night and had nausea, vomiting and diarrhea. These symptoms have since resolved but she does not feel much like eating and drinking right now. As a result has not taken some of her medications recently including her blood pressure medication.     Complains of fatigue. Says it started before she became ill on Sunday night but is worse now. Reports compliance with nightly CPAP.     Brings up recent visit for UTI. But denies any UTI symptoms now.     Reviewed lab results from 8/17/23.    Health Maintenance Topics with due status: Not Due       Topic Last Completion Date    TETANUS VACCINE 06/19/2014    Colorectal Cancer Screening 09/09/2022    Foot Exam 01/17/2023    DEXA Scan 06/12/2023    Eye Exam 07/24/2023    Lipid Panel 08/17/2023    Hemoglobin A1c 08/17/2023    Aspirin/Antiplatelet Therapy 09/13/2023        Review of Systems   Constitutional:  Positive for fatigue.   Gastrointestinal:         See HPI   Musculoskeletal:  Positive for back pain.   All other systems reviewed and are negative.      Objective:     Vitals:    09/13/23 1110   BP: 122/70   Pulse: 66        Physical Exam  Vitals reviewed.   Constitutional:       General: She is not in acute distress.     Appearance: Normal appearance. She is well-developed. She is obese. She is not ill-appearing, toxic-appearing or diaphoretic.   HENT:      Head: Normocephalic and atraumatic.      Right Ear: External  ear normal.      Left Ear: External ear normal.      Nose: Nose normal.   Eyes:      General: No scleral icterus.        Right eye: No discharge.         Left eye: No discharge.      Extraocular Movements: Extraocular movements intact.      Conjunctiva/sclera: Conjunctivae normal.   Cardiovascular:      Rate and Rhythm: Normal rate and regular rhythm.      Heart sounds: Normal heart sounds. No murmur heard.     No friction rub. No gallop.   Pulmonary:      Effort: Pulmonary effort is normal. No respiratory distress.      Breath sounds: Normal breath sounds. No stridor. No wheezing, rhonchi or rales.   Skin:     General: Skin is warm and dry.   Neurological:      General: No focal deficit present.      Mental Status: She is alert and oriented to person, place, and time. Mental status is at baseline.   Psychiatric:         Mood and Affect: Mood normal.         Behavior: Behavior normal.         Thought Content: Thought content normal.         Judgment: Judgment normal.         Assessment:       1. Right sided sciatica Chronic   2. Dorsalgia, unspecified Chronic   3. Screening mammogram for breast cancer    4. Type 2 diabetes mellitus with stage 3a chronic kidney disease, with long-term current use of insulin Chronic   5. Hypertension associated with diabetes Chronic   6. Fatigue, unspecified type Chronic   7. Gastroenteritis        Plan:         Right sided sciatica  -     gabapentin (NEURONTIN) 600 MG tablet; Take 1 tablet (600 mg total) by mouth 2 (two) times daily.  Dispense: 180 tablet; Refill: 3  -     CT Lumbar Spine Without Contrast; Future; Expected date: 09/13/2023    Dorsalgia, unspecified  -     CT Lumbar Spine Without Contrast; Future; Expected date: 09/13/2023    Screening mammogram for breast cancer  -     Mammo Digital Screening Bilat; Future; Expected date: 10/15/2023    Type 2 diabetes mellitus with stage 3a chronic kidney disease, with long-term current use of insulin  Comments:  Continue to follow  with endocrinology  Orders:  -     Comprehensive Metabolic Panel; Future; Expected date: 03/13/2024  -     Hemoglobin A1C; Future; Expected date: 03/13/2024  -     Lipid Panel; Future; Expected date: 03/13/2024  -     Microalbumin/Creatinine Ratio, Urine; Future; Expected date: 03/13/2024  -     CBC Auto Differential; Future; Expected date: 03/13/2024    Hypertension associated with diabetes  Comments:  See AVS    Fatigue, unspecified type  Comments:  Suspect due to obesity and co morbidities, recently worsened with viral GI illness. See AVS. Continue to follow with sleep medicine    Gastroenteritis  Comments:  Suspected cause of symptoms. Improving. Increase fluid intake. She declines testing for covid.       Nurse BP check visit in one month    RTC 6 months     I spent a total of 45 minutes on the day of the visit.This includes face to face time and non-face to face time preparing to see the patient (eg, review of tests), obtaining and/or reviewing separately obtained history, documenting clinical information in the electronic or other health record, independently interpreting results and communicating results to the patient/family/caregiver, or care coordinator.       Warning signs discussed, patient to call with any further issues or worsening of symptoms.       Parts of the above note were dictated using a voice dictation software. Please excuse any grammatical or typographical errors.

## 2023-09-13 NOTE — PATIENT INSTRUCTIONS
Ok to stop valsartan now. Please try to hydrate well. When you are able to drink fluids and your BP is >120/80 you can restart the valsartan.     If still feeling weak in 1-2 weeks, message me for fatigue labs.     If still having diarrhea or vomiting in 1-2 weeks, let me know for GI appt.

## 2023-09-15 ENCOUNTER — CLINICAL SUPPORT (OUTPATIENT)
Dept: ALLERGY | Facility: CLINIC | Age: 73
End: 2023-09-15
Payer: MEDICARE

## 2023-09-15 DIAGNOSIS — J30.9 CHRONIC ALLERGIC RHINITIS: Primary | ICD-10-CM

## 2023-09-15 PROCEDURE — 95115 IMMUNOTHERAPY ONE INJECTION: CPT | Mod: HCNC,S$GLB,, | Performed by: STUDENT IN AN ORGANIZED HEALTH CARE EDUCATION/TRAINING PROGRAM

## 2023-09-15 PROCEDURE — 95115 PR IMMUNOTHERAPY, ONE INJECTION: ICD-10-PCS | Mod: HCNC,S$GLB,, | Performed by: STUDENT IN AN ORGANIZED HEALTH CARE EDUCATION/TRAINING PROGRAM

## 2023-09-17 NOTE — H&P
[facility]  History & Physical    Subjective:      Chief Complaint/Reason for Admission:     Vivienne Jose is a 73 y.o. female.    Past Medical History:   Diagnosis Date    Allergy     Angio-edema     Arthritis     Cataract     bilateral - not removed    Cerebrovascular malformation     Cirrhosis 11/24/2020    Colon polyps     Diabetes mellitus, type 2     Diabetic peripheral neuropathy     Edema of both feet 8/19/2022    GERD (gastroesophageal reflux disease)     Herpes infection     Hypothyroidism     Kidney stones     Mild nonproliferative diabetic retinopathy of 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 edema associated with type 2 diabetes mellitus 4/18/2019    DEL RIO (nonalcoholic steatohepatitis) 8/19/2022    Nausea & vomiting 11/23/2015    Obesity, morbid     Ovarian cyst     Seizure disorder, focal motor     Sleep apnea     Type II or unspecified type diabetes mellitus with neurological manifestations, uncontrolled(250.62)     Urticaria      Past Surgical History:   Procedure Laterality Date    CARPAL TUNNEL RELEASE Right 2014    COLONOSCOPY      COLONOSCOPY N/A 9/13/2017    Procedure: COLONOSCOPY Golytely;  Surgeon: Gisela Wall MD;  Location: [facility];  Service: Endoscopy;  Laterality: N/A;    COLONOSCOPY N/A 9/9/2022    Procedure: COLONOSCOPY Extended Suprep;  Surgeon: Britt Jasso MD;  Location: [facility];  Service: Endoscopy;  Laterality: N/A;    CYST REMOVAL      skin; multiples    ESOPHAGOGASTRODUODENOSCOPY Left 10/15/2021    Procedure: EGD (ESOPHAGOGASTRODUODENOSCOPY);  Surgeon: Luis Fernando Taveras MD;  Location: [facility] (4TH FLR);  Service: Endoscopy;  Laterality: Left;  cirrhosis labwork am of procedure  last seizure 1973  COVID test on 10/12/21 at [facility]    ESOPHAGOGASTRODUODENOSCOPY N/A 3/10/2023    Procedure: EGD (ESOPHAGOGASTRODUODENOSCOPY);  Surgeon: Christa Caldera MD;  Location: [facility] (2ND FLR);  Service: Endoscopy;  Laterality: N/A;  Medically  Urgent  cirrohsis-labs done on 2/9/23 (< 90 days)  New onset A-fib  3/1 instructions to portal-st  Precall complete- [place]    EXTRACORPOREAL SHOCK WAVE LITHOTRIPSY  2002    HYSTERECTOMY  1984    JOINT REPLACEMENT Right 03/06/2019    knee    KIDNEY STONE SURGERY      KNEE ARTHROPLASTY Right 3/6/2019    Procedure: ARTHROPLASTY, KNEE;  Surgeon: Pj Gamboa MD;  Location: [facility];  Service: Orthopedics;  Laterality: Right;  Depuy (Stephen notified 2/21, CC)    THYROIDECTOMY  1977         TONSILLECTOMY, ADENOIDECTOMY      TRIGGER FINGER RELEASE      TUBAL LIGATION       Family History   Problem Relation Age of Onset    Skin cancer Mother     Macular degeneration Mother     Dementia Mother     Hypertension Mother     Cancer Father     Arthritis Father     Gout Father     No Known Problems Daughter     Diabetes Daughter     Hypertension Daughter     Arthritis Daughter     Allergies Son     Allergic rhinitis Son     Glaucoma Maternal Aunt         Great Maternal Aunt    Leukemia Maternal Uncle     Amblyopia Neg Hx     Cataracts Neg Hx     Retinal detachment Neg Hx     Strabismus Neg Hx     Stroke Neg Hx     Thyroid disease Neg Hx     Kidney disease Neg Hx     Angioedema Neg Hx     Asthma Neg Hx     Atopy Neg Hx     Eczema Neg Hx     Immunodeficiency Neg Hx     Rhinitis Neg Hx     Urticaria Neg Hx      Social History     Tobacco Use    Smoking status: Never    Smokeless tobacco: Never   Substance Use Topics    Alcohol use: No     Alcohol/week: 0.0 standard drinks of alcohol    Drug use: No       No medications prior to admission.     Review of patient's allergies indicates:   Allergen Reactions    Sulfa (sulfonamide antibiotics) Nausea Only    Ciprofloxacin     Januvia [sitagliptin]      abd pain    Lisinopril     Lotensin [benazepril]     Nexium [esomeprazole magnesium] Other (See Comments)     Gas        Review of Systems   Eyes:  Positive for blurred vision.   All other systems reviewed and are negative.      Objective:       Vital Signs (Most Recent)       Vital Signs Range (Last 24H):  BP: ()/()   Arterial Line BP: ()/()     Physical Exam  Constitutional:       Appearance: She is well-developed.   HENT:      Head: Normocephalic.   Eyes:      Conjunctiva/sclera: Conjunctivae normal.      Pupils: Pupils are equal, round, and reactive to light.   Cardiovascular:      Rate and Rhythm: Normal rate and regular rhythm.      Heart sounds: Normal heart sounds.   Pulmonary:      Effort: Pulmonary effort is normal.      Breath sounds: Normal breath sounds.   Abdominal:      General: Bowel sounds are normal.      Palpations: Abdomen is soft.   Musculoskeletal:         General: Normal range of motion.      Cervical back: Normal range of motion and neck supple.   Skin:     General: Skin is warm.   Neurological:      Mental Status: She is alert and oriented to person, place, and time.         Data Review:     ECG:     Assessment:      Cataract OD.    Plan:    CE OD.

## 2023-09-18 ENCOUNTER — HOSPITAL ENCOUNTER (OUTPATIENT)
Dept: RADIOLOGY | Facility: HOSPITAL | Age: 73
Discharge: HOME | End: 2023-09-18
Attending: INTERNAL MEDICINE
Payer: MEDICARE

## 2023-09-18 ENCOUNTER — ANESTHESIA EVENT (OUTPATIENT)
Dept: SURGERY | Facility: HOSPITAL | Age: 73
End: 2023-09-18
Payer: MEDICARE

## 2023-09-18 DIAGNOSIS — M54.31 RIGHT SIDED SCIATICA: ICD-10-CM

## 2023-09-18 DIAGNOSIS — M54.9 DORSALGIA, UNSPECIFIED: ICD-10-CM

## 2023-09-18 PROCEDURE — 72131 CT LUMBAR SPINE W/O DYE: CPT | Mod: TC,HCNC

## 2023-09-18 NOTE — PRE-PROCEDURE INSTRUCTIONS
- Nothing to eat or drink after midinight, except AM meds with small sips of water, Gatorade/Powerade  - Hold all Diabetic meds AM of surgery  - Hold all Insulin AM of surgery  - Hold all Fluid pills AM of surgery  - Hold all non-insulin shots until after surgery (Ozempic, Mounjaro, Trulicity, Victoza, Byetta, Wegovy and Adlyxin) (up to 7 days prior)  - Take all B/P meds, except those that contain a fluid pill  - Hold all vits and herbal meds AM of surgery  - Use inhalers as needed and bring AM of surgery  - Take blood thinner meds AM of surgery  - Use eye drops as directed  - Shower and wash face with dial soap for 3 mins PM prior and AM of surgery  - No powder, lotions, creams, (makeup),  or jewelry    - Wear comfortable clothing (button up shirt)    (Patients ride may not leave while patient is in surgery)    -- 2nd floor surgery ctr at [facility] @ [address] [facility] [address] [place] [address]       Pt voiced understanding

## 2023-09-18 NOTE — ANESTHESIA PREPROCEDURE EVALUATION
09/18/2023  Vivienne Jose is a 73 y.o., female.      Pre-op Assessment    I have reviewed the Patient Summary Reports.    I have reviewed the NPO Status.   I have reviewed the Medications.     Review of Systems  Anesthesia Hx:  Denies Family Hx of Anesthesia complications.   Denies Personal Hx of Anesthesia complications.   Hematology/Oncology:  Hematology Normal        EENT/Dental:   Eyes:   Cardiovascular:   Hypertension CAD  Dysrhythmias  PVD NYHA Classification II Carotid stenosis  PVCs  RBBB   Pulmonary:   Sleep Apnea    Renal/:   Chronic Renal Disease renal calculi    Hepatic/GI:   GERD Liver Disease, Hepatitis    Musculoskeletal:   Arthritis     Neurological:   Neuromuscular Disease, Seizures Motion sickness   Endocrine:   Diabetes, type 2 Hypothyroidism Off ozempic x 2 weeks Morbid Obesity / BMI > 40  Psych:  Psychiatric Normal         Type II or unspecified type diabetes mellitus with neurological manifestations, uncontrolled(250.62) GERD (gastroesophageal reflux disease)   Allergy Sleep apnea   Hypothyroidism Seizure disorder, focal motor   Obesity, morbid Ovarian cyst   Kidney stones Cerebrovascular malformation   Herpes infection Angio-edema   Urticaria Cataract   Arthritis Diabetic peripheral neuropathy   Colon polyps Nausea & vomiting   Diabetes mellitus, type 2 Mild nonproliferative diabetic retinopathy of 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 edema associated with type 2 diabetes mellitus   Cirrhosis DEL RIO (nonalcoholic steatohepatitis)   Edema of both feet      Surgical History    THYROIDECTOMY CARPAL TUNNEL RELEASE   CYST REMOVAL TUBAL LIGATION   COLONOSCOPY TONSILLECTOMY, ADENOIDECTOMY   EXTRACORPOREAL SHOCK WAVE LITHOTRIPSY COLONOSCOPY   TRIGGER FINGER RELEASE KIDNEY STONE SURGERY   KNEE ARTHROPLASTY JOINT REPLACEMENT   HYSTERECTOMY ESOPHAGOGASTRODUODENOSCOPY   COLONOSCOPY ESOPHAGOGASTRODUODENOSCOPY      COVID-19 Risk of Complications        Physical Exam  General: Well nourished, Cooperative, Alert and Oriented    Airway:  Mouth Opening: Normal  TM Distance: Normal  Tongue: Normal        Anesthesia Plan  Type of Anesthesia, risks & benefits discussed:    Anesthesia Type: MAC  Intra-op Monitoring Plan: Standard ASA Monitors  Post Op Pain Control Plan: multimodal analgesia and IV/PO Opioids PRN  Induction:  IV  Informed Consent: Informed consent signed with the Patient and all parties understand the risks and agree with anesthesia plan.  All questions answered.   ASA Score: 3  Day of Surgery Review of History & Physical: H&P Update referred to the surgeon/provider.I have interviewed and examined the patient. I have reviewed the patient's H&P dated: There are no significant changes. H&P completed by Anesthesiologist.    Ready For Surgery From Anesthesia Perspective.     .

## 2023-09-19 ENCOUNTER — HOSPITAL ENCOUNTER (OUTPATIENT)
Facility: HOSPITAL | Age: 73
Discharge: HOME | End: 2023-09-19
Attending: OPHTHALMOLOGY | Admitting: OPHTHALMOLOGY
Payer: MEDICARE

## 2023-09-19 ENCOUNTER — ANESTHESIA (OUTPATIENT)
Dept: SURGERY | Facility: HOSPITAL | Age: 73
End: 2023-09-19
Payer: MEDICARE

## 2023-09-19 VITALS
OXYGEN SATURATION: 95 % | SYSTOLIC BLOOD PRESSURE: 127 MMHG | HEART RATE: 62 BPM | RESPIRATION RATE: 20 BRPM | TEMPERATURE: 98 F | DIASTOLIC BLOOD PRESSURE: 58 MMHG

## 2023-09-19 DIAGNOSIS — M65.331 TRIGGER MIDDLE FINGER OF RIGHT HAND: ICD-10-CM

## 2023-09-19 DIAGNOSIS — H25.11 NUCLEAR SCLEROTIC CATARACT OF RIGHT EYE: Primary | ICD-10-CM

## 2023-09-19 LAB — POCT GLUCOSE: 168 MG/DL (ref 70–110)

## 2023-09-19 PROCEDURE — D9220A PRA ANESTHESIA: Mod: ,,, | Performed by: NURSE ANESTHETIST, CERTIFIED REGISTERED

## 2023-09-19 PROCEDURE — 36000707: Performed by: OPHTHALMOLOGY

## 2023-09-19 PROCEDURE — 36000706: Performed by: OPHTHALMOLOGY

## 2023-09-19 PROCEDURE — 66984 XCAPSL CTRC RMVL W/O ECP: CPT | Mod: HCNC,RT,, | Performed by: OPHTHALMOLOGY

## 2023-09-19 PROCEDURE — 99900035 HC TECH TIME PER 15 MIN (STAT)

## 2023-09-19 PROCEDURE — 66984 PR REMOVAL, CATARACT, W/INSRT INTRAOC LENS, W/O ENDO CYCLO: ICD-10-PCS | Mod: HCNC,RT,, | Performed by: OPHTHALMOLOGY

## 2023-09-19 PROCEDURE — V2632 POST CHMBR INTRAOCULAR LENS: HCPCS | Performed by: OPHTHALMOLOGY

## 2023-09-19 PROCEDURE — 63600175 PHARM REV CODE 636 W HCPCS: Performed by: NURSE ANESTHETIST, CERTIFIED REGISTERED

## 2023-09-19 PROCEDURE — 82962 GLUCOSE BLOOD TEST: CPT | Performed by: OPHTHALMOLOGY

## 2023-09-19 PROCEDURE — 27201423 OPTIME MED/SURG SUP & DEVICES STERILE SUPPLY: Performed by: OPHTHALMOLOGY

## 2023-09-19 PROCEDURE — 37000008 HC ANESTHESIA 1ST 15 MINUTES: Performed by: OPHTHALMOLOGY

## 2023-09-19 PROCEDURE — 37000009 HC ANESTHESIA EA ADD 15 MINS: Performed by: OPHTHALMOLOGY

## 2023-09-19 PROCEDURE — 71000015 HC POSTOP RECOV 1ST HR: Performed by: OPHTHALMOLOGY

## 2023-09-19 PROCEDURE — 94761 N-INVAS EAR/PLS OXIMETRY MLT: CPT

## 2023-09-19 PROCEDURE — 63600175 PHARM REV CODE 636 W HCPCS: Performed by: OPHTHALMOLOGY

## 2023-09-19 PROCEDURE — D9220A PRA ANESTHESIA: ICD-10-PCS | Mod: ,,, | Performed by: NURSE ANESTHETIST, CERTIFIED REGISTERED

## 2023-09-19 PROCEDURE — 25000003 PHARM REV CODE 250: Performed by: OPHTHALMOLOGY

## 2023-09-19 PROCEDURE — 63600175 PHARM REV CODE 636 W HCPCS: Performed by: ANESTHESIOLOGY

## 2023-09-19 DEVICE — LENS CLAREON AUTONOME 19.0D: Type: IMPLANTABLE DEVICE | Site: EYE | Status: FUNCTIONAL

## 2023-09-19 RX ORDER — HYDROCODONE BITARTRATE AND ACETAMINOPHEN 5; 325 MG/1; MG/1
1 TABLET ORAL EVERY 4 HOURS PRN
Status: DISCONTINUED | OUTPATIENT
Start: 2023-09-19 | End: 2023-09-19 | Stop reason: HOSPADM

## 2023-09-19 RX ORDER — CYCLOP/TROP/PROPA/PHEN/KET/WAT 1-1-0.1%
1 DROPS (EA) OPHTHALMIC (EYE)
Status: COMPLETED | OUTPATIENT
Start: 2023-09-19 | End: 2023-09-19

## 2023-09-19 RX ORDER — TETRACAINE HYDROCHLORIDE 5 MG/ML
SOLUTION OPHTHALMIC
Status: DISCONTINUED | OUTPATIENT
Start: 2023-09-19 | End: 2023-09-19 | Stop reason: HOSPADM

## 2023-09-19 RX ORDER — LIDOCAINE HYDROCHLORIDE 40 MG/ML
INJECTION, SOLUTION RETROBULBAR
Status: DISCONTINUED | OUTPATIENT
Start: 2023-09-19 | End: 2023-09-19 | Stop reason: HOSPADM

## 2023-09-19 RX ORDER — MIDAZOLAM HYDROCHLORIDE 1 MG/ML
INJECTION, SOLUTION INTRAMUSCULAR; INTRAVENOUS
Status: DISCONTINUED | OUTPATIENT
Start: 2023-09-19 | End: 2023-09-19

## 2023-09-19 RX ORDER — ACETAMINOPHEN 325 MG/1
650 TABLET ORAL EVERY 4 HOURS PRN
Status: DISCONTINUED | OUTPATIENT
Start: 2023-09-19 | End: 2023-09-19 | Stop reason: HOSPADM

## 2023-09-19 RX ORDER — SODIUM CHLORIDE 9 MG/ML
INJECTION, SOLUTION INTRAVENOUS CONTINUOUS
Status: DISCONTINUED | OUTPATIENT
Start: 2023-09-19 | End: 2023-09-19 | Stop reason: HOSPADM

## 2023-09-19 RX ORDER — EPINEPHRINE 1 MG/ML
INJECTION, SOLUTION, CONCENTRATE INTRAVENOUS
Status: DISCONTINUED | OUTPATIENT
Start: 2023-09-19 | End: 2023-09-19 | Stop reason: HOSPADM

## 2023-09-19 RX ORDER — ONDANSETRON 2 MG/ML
4 INJECTION INTRAMUSCULAR; INTRAVENOUS ONCE
Status: COMPLETED | OUTPATIENT
Start: 2023-09-19 | End: 2023-09-19

## 2023-09-19 RX ORDER — FENTANYL CITRATE 50 UG/ML
INJECTION, SOLUTION INTRAMUSCULAR; INTRAVENOUS
Status: DISCONTINUED | OUTPATIENT
Start: 2023-09-19 | End: 2023-09-19

## 2023-09-19 RX ORDER — PREDNISOLONE ACETATE 10 MG/ML
SUSPENSION/ DROPS OPHTHALMIC
Status: DISCONTINUED | OUTPATIENT
Start: 2023-09-19 | End: 2023-09-19 | Stop reason: HOSPADM

## 2023-09-19 RX ORDER — MOXIFLOXACIN 5 MG/ML
1 SOLUTION/ DROPS OPHTHALMIC
Status: COMPLETED | OUTPATIENT
Start: 2023-09-19 | End: 2023-09-19

## 2023-09-19 RX ADMIN — FENTANYL CITRATE 25 MCG: 50 INJECTION, SOLUTION INTRAMUSCULAR; INTRAVENOUS at 07:09

## 2023-09-19 RX ADMIN — MOXIFLOXACIN OPHTHALMIC 1 DROP: 5 SOLUTION/ DROPS OPHTHALMIC at 06:09

## 2023-09-19 RX ADMIN — ONDANSETRON 4 MG: 2 INJECTION INTRAMUSCULAR; INTRAVENOUS at 06:09

## 2023-09-19 RX ADMIN — Medication 1 DROP: at 06:09

## 2023-09-19 RX ADMIN — MIDAZOLAM HYDROCHLORIDE 2 MG: 1 INJECTION, SOLUTION INTRAMUSCULAR; INTRAVENOUS at 07:09

## 2023-09-19 NOTE — BRIEF OP NOTE
[facility]  Brief Operative Note    Surgery Date: 9/19/2023     Surgeon(s) and Role:     * Lyndon Ortiz MD - Primary    Assisting Surgeon: None    Pre-op Diagnosis:  NS (nuclear sclerosis), right [H25.11]    Post-op Diagnosis:  Post-Op Diagnosis Codes:     * NS (nuclear sclerosis), right [H25.11]    Procedure(s) (LRB):  EXTRACTION, CATARACT, WITH IOL INSERTION (Right)    Anesthesia: Local MAC    Operative Findings:     Estimated Blood Loss: * No values recorded between 9/19/2023  7:25 AM and 9/19/2023  7:51 AM *         Specimens:   Specimen (24h ago, onward)      None              Discharge Note    OUTCOME: Patient tolerated treatment/procedure well without complication and is now ready for discharge.    DISPOSITION: Home or Self Care    FINAL DIAGNOSIS:  Nuclear sclerotic cataract of right eye    FOLLOWUP: In clinic    DISCHARGE INSTRUCTIONS:    Discharge Procedure Orders   Other restrictions (specify):   Order Comments: No heavy lifting or bending for 1 week.

## 2023-09-19 NOTE — TRANSFER OF CARE
Anesthesia Transfer of Care Note    Patient: Vivienne Jose    Procedure(s) Performed: Procedure(s) (LRB):  EXTRACTION, CATARACT, WITH IOL INSERTION (Right)    Patient location: PACU    Anesthesia Type: MAC    Transport from OR: Transported from OR on room air with adequate spontaneous ventilation    Post pain: adequate analgesia    Post assessment: no apparent anesthetic complications and tolerated procedure well    Post vital signs: stable    Level of consciousness: awake, alert and oriented    Nausea/Vomiting: no nausea/vomiting    Complications: none    Transfer of care protocol was followed      Last vitals:   Visit Vitals  /61 (BP Location: Left arm, Patient Position: Lying)   Pulse 64   Temp 36.8 °C (98.2 °F) (Temporal)   Resp 20   LMP  (LMP Unknown)   SpO2 95%   Breastfeeding No

## 2023-09-19 NOTE — OP NOTE
Operative Date:  09/19/2023    Discharge Date:  09/19/2023    Discharge Patient Home    Report Title: Operative Note      SURGEON: Lyndon Ortiz MD     ASSISTANT:     PREOPERATIVE DIAGNOSIS: Visually significant NSC cataract,  Right Eye.    POSTOPERATIVE DIAGNOSIS: Visually-significant NSC cataract,  Right Eye.    PROCEDURE PERFORMED: Phacoemulsification of the cataract with posterior chamber intraocular lens Right Eye.    ANESTHESIA: Topical with MAC    COMPLICATIONS: None    ESTIMATED BLOOD LOSS: Minimal    INDICATIONS FOR PROCEDURE:   The patient is a pleasant 73 year old woman with increasing difficulties with activities of daily living secondary to a dense visually significant cataract in the Right Eye.  Discussions have been carried out with this patient concerning the options to surgery, risks, benefits and expectations.  The patient voiced good understanding and wished to proceed with the above procedure.    PROCEDURE IN DETAIL: The patient was brought to the operating room and received topical anesthetic to the eye and then was prepped and draped in the usual sterile fashion.  Using the Contreras ring and the guarded afsaneh blade set at 0.37 mm, a partial thickness clear cornea incision was made temporally.  The paracentesis site was made at the twelve o'clock position.  Omidria was injected into the anterior chamber through the paracentesis.  Viscoat was then injected into the anterior chamber.  The eye was then reentered at the primary surgical site with a 2.4 mm keratome followed by continuous capsulotomy, hydrodissection, hydrodelineation and phacoemulsification of the cataract.  Residual cortical material was removed using automated irrigation-aspiration technique.  Healon was injected into the posterior chamber and a CNAOTO 19.0 diopter lens was placed in the bag without difficulty. Residual viscoelastic was removed using automated irrigation-aspiration technique. The eye was re-pressurized  using BSS solution and both the paracentesis site and the primary surgical site were demonstrated to be watertight at the end of the case with Weck--Marlyn manipulation.  One drop of Ofloxacin and one drop of Pred acetate 1% was applied to the Right Eye .The eye was closed, patched and a Rhodes shield placed.  The patient was taken to the recovery room in good and stable condition.  The patient tolerated the procedure well.  The patient was instructed to refrain from any heavy lifting, bending, stooping or straining activities, discharged home  and to follow-up in the morning for routine postoperative care with Lyndon Ortiz MD.

## 2023-09-19 NOTE — ANESTHESIA POSTPROCEDURE EVALUATION
Anesthesia Post Evaluation    Patient: Vivienne Jose    Procedure(s) Performed: Procedure(s) (LRB):  EXTRACTION, CATARACT, WITH IOL INSERTION (Right)    Final Anesthesia Type: MAC      Patient location during evaluation: PACU  Patient participation: Yes- Able to Participate  Level of consciousness: awake and alert  Post-procedure vital signs: reviewed and stable  Pain management: adequate  Airway patency: patent    PONV status at discharge: No PONV  Anesthetic complications: no      Cardiovascular status: blood pressure returned to baseline  Respiratory status: unassisted, spontaneous ventilation and room air  Hydration status: euvolemic  Follow-up not needed.          Vitals Value Taken Time   /58 09/19/23 0811   Temp 36.8 °C (98.2 °F) 09/19/23 0753   Pulse 62 09/19/23 0813   Resp 20 09/19/23 0811   SpO2 93 % 09/19/23 0813   Vitals shown include unvalidated device data.      No case tracking events are documented in the log.      Pain/Gordo Score: Gordo Score: 10 (9/19/2023  7:53 AM)

## 2023-09-19 NOTE — DISCHARGE INSTRUCTIONS
Cataract Post Surgery Instructions     START YOUR DROPS AS SOON AS YOU GET HOME FROM SURGERY      Instill the following drops 1 drop, three (3) times daily, every four (4) hours.      FIVE MINUTES APART FROM EACH OTHER      OFLOXACIN  ( [place])    KETOROLAC  ( [place])    PREDNISOLONE ACETATE  ( [place] or [place])         RESTRICTIONS FOR SEVEN (7) DAYS FOLLOWING SURGERY     DO NOT lift anything over 10 pounds.     DO NOT bend at the waist, only at the knees.      DO NOT rub your eye.      DO NOT get any water into the eye.      DO NOT wear any makeup, lotions, or creams on/around the eye.     Wear the eye shield you were given after surgery anytime you go to sleep.  You may remove the shield while awake.           NOTE:     YOU MAY resume taking ALL your medications after surgery.     YOU MAY resume driving on your first post-operative appointment IF your vision is clear. DO NOT wear your eye shield while driving for your post operative appointments.      YOU MAY take an over-the counter pain medication such as Tylenol or Ibuprofen as needed for pain.     CALL US:  MILD DISCOMFORT IS NORMAL. HOWEVER, IF YOU EXPERIENCE SEVERE THROBBING PAIN, LOSS OF VISION, ONSET OF FLASHES AND/OR FLOATERS- CALL DR. PRADHAN OFFICE: [phone] AFTER HOUR [phone] OR PROCEED TO THE EMERGENCY ROOM.

## 2023-09-19 NOTE — PLAN OF CARE
Chart reviewed. Preop nursing care completed per orders. Safe surgery checklist complete. Pt denies any open wounds cuts or sores.Pt denies any metal in body. Pt AAOX3, VSS on room air. Pt toileted, Bed locked in lowest position, Call light within reach. Pt denies any needs at this time. Will continue to monitor.

## 2023-09-20 ENCOUNTER — OFFICE VISIT (OUTPATIENT)
Dept: OPHTHALMOLOGY | Facility: CLINIC | Age: 73
End: 2023-09-20
Payer: MEDICARE

## 2023-09-20 ENCOUNTER — LAB VISIT (OUTPATIENT)
Dept: LAB | Facility: HOSPITAL | Age: 73
End: 2023-09-20
Attending: INTERNAL MEDICINE
Payer: MEDICARE

## 2023-09-20 DIAGNOSIS — K74.69 OTHER CIRRHOSIS OF LIVER: ICD-10-CM

## 2023-09-20 DIAGNOSIS — R74.8 ELEVATED LIVER ENZYMES: Primary | ICD-10-CM

## 2023-09-20 DIAGNOSIS — M51.36 BULGING LUMBAR DISC: ICD-10-CM

## 2023-09-20 DIAGNOSIS — Z98.890 POST-OPERATIVE STATE: Primary | ICD-10-CM

## 2023-09-20 DIAGNOSIS — M48.061 SPINAL STENOSIS OF LUMBAR REGION, UNSPECIFIED WHETHER NEUROGENIC CLAUDICATION PRESENT: Primary | ICD-10-CM

## 2023-09-20 DIAGNOSIS — K76.9 LIVER DISEASE, UNSPECIFIED: ICD-10-CM

## 2023-09-20 LAB
AFP SERPL-MCNC: 6.8 NG/ML (ref 0–8.4)
ALBUMIN SERPL BCP-MCNC: 3.6 G/DL (ref 3.5–5.2)
ALP SERPL-CCNC: 78 U/L (ref 55–135)
ALT SERPL W/O P-5'-P-CCNC: 20 U/L (ref 10–44)
ANION GAP SERPL CALC-SCNC: 9 MMOL/L (ref 8–16)
AST SERPL-CCNC: 25 U/L (ref 10–40)
BASOPHILS # BLD AUTO: 0.07 K/UL (ref 0–0.2)
BASOPHILS NFR BLD: 0.7 % (ref 0–1.9)
BILIRUB DIRECT SERPL-MCNC: 0.2 MG/DL (ref 0.1–0.3)
BILIRUB SERPL-MCNC: 0.4 MG/DL (ref 0.1–1)
BUN SERPL-MCNC: 26 MG/DL (ref 8–23)
CALCIUM SERPL-MCNC: 10.3 MG/DL (ref 8.7–10.5)
CHLORIDE SERPL-SCNC: 105 MMOL/L (ref 95–110)
CO2 SERPL-SCNC: 25 MMOL/L (ref 23–29)
CREAT SERPL-MCNC: 1.5 MG/DL (ref 0.5–1.4)
DIFFERENTIAL METHOD: ABNORMAL
EOSINOPHIL # BLD AUTO: 0.3 K/UL (ref 0–0.5)
EOSINOPHIL NFR BLD: 3.4 % (ref 0–8)
ERYTHROCYTE [DISTWIDTH] IN BLOOD BY AUTOMATED COUNT: 14 % (ref 11.5–14.5)
EST. GFR  (NO RACE VARIABLE): 36.8 ML/MIN/1.73 M^2
GLUCOSE SERPL-MCNC: 138 MG/DL (ref 70–110)
HCT VFR BLD AUTO: 39.9 % (ref 37–48.5)
HGB BLD-MCNC: 13.4 G/DL (ref 12–16)
IMM GRANULOCYTES # BLD AUTO: 0.05 K/UL (ref 0–0.04)
IMM GRANULOCYTES NFR BLD AUTO: 0.5 % (ref 0–0.5)
INR PPP: 1 (ref 0.8–1.2)
LYMPHOCYTES # BLD AUTO: 2.7 K/UL (ref 1–4.8)
LYMPHOCYTES NFR BLD: 27.4 % (ref 18–48)
MCH RBC QN AUTO: 32.8 PG (ref 27–31)
MCHC RBC AUTO-ENTMCNC: 33.6 G/DL (ref 32–36)
MCV RBC AUTO: 98 FL (ref 82–98)
MONOCYTES # BLD AUTO: 1 K/UL (ref 0.3–1)
MONOCYTES NFR BLD: 10.3 % (ref 4–15)
NEUTROPHILS # BLD AUTO: 5.6 K/UL (ref 1.8–7.7)
NEUTROPHILS NFR BLD: 57.7 % (ref 38–73)
NRBC BLD-RTO: 0 /100 WBC
PLATELET # BLD AUTO: 275 K/UL (ref 150–450)
PMV BLD AUTO: 10.8 FL (ref 9.2–12.9)
POTASSIUM SERPL-SCNC: 5.2 MMOL/L (ref 3.5–5.1)
PROT SERPL-MCNC: 7.8 G/DL (ref 6–8.4)
PROTHROMBIN TIME: 10.7 SEC (ref 9–12.5)
RBC # BLD AUTO: 4.09 M/UL (ref 4–5.4)
SODIUM SERPL-SCNC: 139 MMOL/L (ref 136–145)
WBC # BLD AUTO: 9.72 K/UL (ref 3.9–12.7)

## 2023-09-20 PROCEDURE — 1160F PR REVIEW ALL MEDS BY PRESCRIBER/CLIN PHARMACIST DOCUMENTED: ICD-10-PCS | Mod: CPTII,S$GLB,, | Performed by: OPHTHALMOLOGY

## 2023-09-20 PROCEDURE — 1159F MED LIST DOCD IN RCRD: CPT | Mod: CPTII,S$GLB,, | Performed by: OPHTHALMOLOGY

## 2023-09-20 PROCEDURE — 1157F ADVNC CARE PLAN IN RCRD: CPT | Mod: CPTII,S$GLB,, | Performed by: OPHTHALMOLOGY

## 2023-09-20 PROCEDURE — 4010F ACE/ARB THERAPY RXD/TAKEN: CPT | Mod: CPTII,S$GLB,, | Performed by: OPHTHALMOLOGY

## 2023-09-20 PROCEDURE — 1126F PR PAIN SEVERITY QUANTIFIED, NO PAIN PRESENT: ICD-10-PCS | Mod: CPTII,S$GLB,, | Performed by: OPHTHALMOLOGY

## 2023-09-20 PROCEDURE — 3288F FALL RISK ASSESSMENT DOCD: CPT | Mod: CPTII,S$GLB,, | Performed by: OPHTHALMOLOGY

## 2023-09-20 PROCEDURE — 85610 PROTHROMBIN TIME: CPT | Mod: HCNC | Performed by: INTERNAL MEDICINE

## 2023-09-20 PROCEDURE — 82105 ALPHA-FETOPROTEIN SERUM: CPT | Mod: HCNC | Performed by: INTERNAL MEDICINE

## 2023-09-20 PROCEDURE — 99999 PR PBB SHADOW E&M-EST. PATIENT-LVL IV: ICD-10-PCS | Mod: PBBFAC,,, | Performed by: OPHTHALMOLOGY

## 2023-09-20 PROCEDURE — 1126F AMNT PAIN NOTED NONE PRSNT: CPT | Mod: CPTII,S$GLB,, | Performed by: OPHTHALMOLOGY

## 2023-09-20 PROCEDURE — 36415 COLL VENOUS BLD VENIPUNCTURE: CPT | Performed by: INTERNAL MEDICINE

## 2023-09-20 PROCEDURE — 80053 COMPREHEN METABOLIC PANEL: CPT | Mod: HCNC | Performed by: INTERNAL MEDICINE

## 2023-09-20 PROCEDURE — 3044F PR MOST RECENT HEMOGLOBIN A1C LEVEL <7.0%: ICD-10-PCS | Mod: CPTII,S$GLB,, | Performed by: OPHTHALMOLOGY

## 2023-09-20 PROCEDURE — 1101F PT FALLS ASSESS-DOCD LE1/YR: CPT | Mod: CPTII,S$GLB,, | Performed by: OPHTHALMOLOGY

## 2023-09-20 PROCEDURE — 99024 POSTOP FOLLOW-UP VISIT: CPT | Mod: S$GLB,,, | Performed by: OPHTHALMOLOGY

## 2023-09-20 PROCEDURE — 99024 PR POST-OP FOLLOW-UP VISIT: ICD-10-PCS | Mod: S$GLB,,, | Performed by: OPHTHALMOLOGY

## 2023-09-20 PROCEDURE — 4010F PR ACE/ARB THEARPY RXD/TAKEN: ICD-10-PCS | Mod: CPTII,S$GLB,, | Performed by: OPHTHALMOLOGY

## 2023-09-20 PROCEDURE — 3288F PR FALLS RISK ASSESSMENT DOCUMENTED: ICD-10-PCS | Mod: CPTII,S$GLB,, | Performed by: OPHTHALMOLOGY

## 2023-09-20 PROCEDURE — 1159F PR MEDICATION LIST DOCUMENTED IN MEDICAL RECORD: ICD-10-PCS | Mod: CPTII,S$GLB,, | Performed by: OPHTHALMOLOGY

## 2023-09-20 PROCEDURE — 1101F PR PT FALLS ASSESS DOC 0-1 FALLS W/OUT INJ PAST YR: ICD-10-PCS | Mod: CPTII,S$GLB,, | Performed by: OPHTHALMOLOGY

## 2023-09-20 PROCEDURE — 85025 COMPLETE CBC W/AUTO DIFF WBC: CPT | Mod: HCNC | Performed by: INTERNAL MEDICINE

## 2023-09-20 PROCEDURE — 1157F PR ADVANCE CARE PLAN OR EQUIV PRESENT IN MEDICAL RECORD: ICD-10-PCS | Mod: CPTII,S$GLB,, | Performed by: OPHTHALMOLOGY

## 2023-09-20 PROCEDURE — 1160F RVW MEDS BY RX/DR IN RCRD: CPT | Mod: CPTII,S$GLB,, | Performed by: OPHTHALMOLOGY

## 2023-09-20 PROCEDURE — 82248 BILIRUBIN DIRECT: CPT | Mod: HCNC | Performed by: INTERNAL MEDICINE

## 2023-09-20 PROCEDURE — 99999 PR PBB SHADOW E&M-EST. PATIENT-LVL IV: CPT | Mod: PBBFAC,,, | Performed by: OPHTHALMOLOGY

## 2023-09-20 PROCEDURE — 3044F HG A1C LEVEL LT 7.0%: CPT | Mod: CPTII,S$GLB,, | Performed by: OPHTHALMOLOGY

## 2023-09-20 NOTE — PROGRESS NOTES
Subjective:       Patient ID: Vivienne Jose is a 73 y.o. female.    Chief Complaint: Post-op Evaluation    HPI    S/p phaco w/IOL OD- 9/19/2023     72 y/o female present to clinic for 1 day post-op of RT eye. She states   s/x went well. Denies pain and discomfort. Reviewed post-op instructions   in clinic today.     Eyemeds  Ofloxacin QID OD  PF QID OD  Ketorolac QID OD  Last edited by Jaqueline New on 9/20/2023 12:39 PM.             Assessment:       1. Post-operative state        Plan:       S/p CE OD- Doing well.         CPM OD.  RTC 1 wk.

## 2023-09-26 ENCOUNTER — TELEPHONE (OUTPATIENT)
Dept: OPHTHALMOLOGY | Facility: CLINIC | Age: 73
End: 2023-09-26
Payer: MEDICARE

## 2023-09-26 ENCOUNTER — LAB VISIT (OUTPATIENT)
Dept: LAB | Facility: HOSPITAL | Age: 73
End: 2023-09-26
Attending: INTERNAL MEDICINE
Payer: MEDICARE

## 2023-09-26 DIAGNOSIS — R74.8 ELEVATED LIVER ENZYMES: ICD-10-CM

## 2023-09-26 DIAGNOSIS — K76.9 LIVER DISEASE, UNSPECIFIED: ICD-10-CM

## 2023-09-26 LAB
ALBUMIN SERPL BCP-MCNC: 3.5 G/DL (ref 3.5–5.2)
ALP SERPL-CCNC: 73 U/L (ref 55–135)
ALT SERPL W/O P-5'-P-CCNC: 22 U/L (ref 10–44)
ANION GAP SERPL CALC-SCNC: 8 MMOL/L (ref 8–16)
AST SERPL-CCNC: 22 U/L (ref 10–40)
BASOPHILS # BLD AUTO: 0.05 K/UL (ref 0–0.2)
BASOPHILS NFR BLD: 0.5 % (ref 0–1.9)
BILIRUB DIRECT SERPL-MCNC: 0.2 MG/DL (ref 0.1–0.3)
BILIRUB SERPL-MCNC: 0.3 MG/DL (ref 0.1–1)
BUN SERPL-MCNC: 19 MG/DL (ref 8–23)
CALCIUM SERPL-MCNC: 9.8 MG/DL (ref 8.7–10.5)
CHLORIDE SERPL-SCNC: 105 MMOL/L (ref 95–110)
CO2 SERPL-SCNC: 26 MMOL/L (ref 23–29)
CREAT SERPL-MCNC: 1.3 MG/DL (ref 0.5–1.4)
DIFFERENTIAL METHOD: ABNORMAL
EOSINOPHIL # BLD AUTO: 0.4 K/UL (ref 0–0.5)
EOSINOPHIL NFR BLD: 4.1 % (ref 0–8)
ERYTHROCYTE [DISTWIDTH] IN BLOOD BY AUTOMATED COUNT: 14.6 % (ref 11.5–14.5)
EST. GFR  (NO RACE VARIABLE): 43.7 ML/MIN/1.73 M^2
GLUCOSE SERPL-MCNC: 181 MG/DL (ref 70–110)
HCT VFR BLD AUTO: 42.7 % (ref 37–48.5)
HGB BLD-MCNC: 13.2 G/DL (ref 12–16)
IMM GRANULOCYTES # BLD AUTO: 0.04 K/UL (ref 0–0.04)
IMM GRANULOCYTES NFR BLD AUTO: 0.4 % (ref 0–0.5)
INR PPP: 1 (ref 0.8–1.2)
LYMPHOCYTES # BLD AUTO: 2.7 K/UL (ref 1–4.8)
LYMPHOCYTES NFR BLD: 26.5 % (ref 18–48)
MCH RBC QN AUTO: 32 PG (ref 27–31)
MCHC RBC AUTO-ENTMCNC: 30.9 G/DL (ref 32–36)
MCV RBC AUTO: 104 FL (ref 82–98)
MONOCYTES # BLD AUTO: 1 K/UL (ref 0.3–1)
MONOCYTES NFR BLD: 9.8 % (ref 4–15)
NEUTROPHILS # BLD AUTO: 6.1 K/UL (ref 1.8–7.7)
NEUTROPHILS NFR BLD: 58.7 % (ref 38–73)
NRBC BLD-RTO: 0 /100 WBC
PLATELET # BLD AUTO: 294 K/UL (ref 150–450)
PMV BLD AUTO: 10.7 FL (ref 9.2–12.9)
POTASSIUM SERPL-SCNC: 4.5 MMOL/L (ref 3.5–5.1)
PROT SERPL-MCNC: 7.6 G/DL (ref 6–8.4)
PROTHROMBIN TIME: 10.8 SEC (ref 9–12.5)
RBC # BLD AUTO: 4.12 M/UL (ref 4–5.4)
SODIUM SERPL-SCNC: 139 MMOL/L (ref 136–145)
WBC # BLD AUTO: 10.33 K/UL (ref 3.9–12.7)

## 2023-09-26 PROCEDURE — 36415 COLL VENOUS BLD VENIPUNCTURE: CPT | Performed by: INTERNAL MEDICINE

## 2023-09-26 PROCEDURE — 85610 PROTHROMBIN TIME: CPT | Mod: HCNC | Performed by: INTERNAL MEDICINE

## 2023-09-26 PROCEDURE — 82248 BILIRUBIN DIRECT: CPT | Mod: HCNC | Performed by: INTERNAL MEDICINE

## 2023-09-26 PROCEDURE — 80053 COMPREHEN METABOLIC PANEL: CPT | Mod: HCNC | Performed by: INTERNAL MEDICINE

## 2023-09-26 PROCEDURE — 85025 COMPLETE CBC W/AUTO DIFF WBC: CPT | Mod: HCNC | Performed by: INTERNAL MEDICINE

## 2023-09-27 ENCOUNTER — OFFICE VISIT (OUTPATIENT)
Dept: OPHTHALMOLOGY | Facility: CLINIC | Age: 73
End: 2023-09-27
Payer: MEDICARE

## 2023-09-27 ENCOUNTER — OFFICE VISIT (OUTPATIENT)
Dept: HEPATOLOGY | Facility: CLINIC | Age: 73
End: 2023-09-27
Payer: MEDICARE

## 2023-09-27 VITALS
SYSTOLIC BLOOD PRESSURE: 127 MMHG | RESPIRATION RATE: 18 BRPM | HEIGHT: 62 IN | WEIGHT: 243.38 LBS | HEART RATE: 53 BPM | OXYGEN SATURATION: 99 % | TEMPERATURE: 98 F | BODY MASS INDEX: 44.79 KG/M2 | DIASTOLIC BLOOD PRESSURE: 86 MMHG

## 2023-09-27 DIAGNOSIS — K74.69 OTHER CIRRHOSIS OF LIVER: Primary | ICD-10-CM

## 2023-09-27 DIAGNOSIS — R60.0 EDEMA LEG: ICD-10-CM

## 2023-09-27 DIAGNOSIS — H25.12 NS (NUCLEAR SCLEROSIS), LEFT: ICD-10-CM

## 2023-09-27 DIAGNOSIS — Z98.890 POST-OPERATIVE STATE: Primary | ICD-10-CM

## 2023-09-27 PROCEDURE — 3044F PR MOST RECENT HEMOGLOBIN A1C LEVEL <7.0%: ICD-10-PCS | Mod: HCNC,CPTII,S$GLB, | Performed by: INTERNAL MEDICINE

## 2023-09-27 PROCEDURE — 1157F PR ADVANCE CARE PLAN OR EQUIV PRESENT IN MEDICAL RECORD: ICD-10-PCS | Mod: CPTII,S$GLB,, | Performed by: OPHTHALMOLOGY

## 2023-09-27 PROCEDURE — 1160F RVW MEDS BY RX/DR IN RCRD: CPT | Mod: CPTII,S$GLB,, | Performed by: OPHTHALMOLOGY

## 2023-09-27 PROCEDURE — 3288F PR FALLS RISK ASSESSMENT DOCUMENTED: ICD-10-PCS | Mod: CPTII,S$GLB,, | Performed by: OPHTHALMOLOGY

## 2023-09-27 PROCEDURE — 3008F PR BODY MASS INDEX (BMI) DOCUMENTED: ICD-10-PCS | Mod: HCNC,CPTII,S$GLB, | Performed by: INTERNAL MEDICINE

## 2023-09-27 PROCEDURE — 1101F PR PT FALLS ASSESS DOC 0-1 FALLS W/OUT INJ PAST YR: ICD-10-PCS | Mod: HCNC,CPTII,S$GLB, | Performed by: INTERNAL MEDICINE

## 2023-09-27 PROCEDURE — 92136 OPHTHALMIC BIOMETRY: CPT | Mod: 26,LT,S$GLB, | Performed by: OPHTHALMOLOGY

## 2023-09-27 PROCEDURE — 4010F PR ACE/ARB THEARPY RXD/TAKEN: ICD-10-PCS | Mod: CPTII,S$GLB,, | Performed by: OPHTHALMOLOGY

## 2023-09-27 PROCEDURE — 3288F FALL RISK ASSESSMENT DOCD: CPT | Mod: HCNC,CPTII,S$GLB, | Performed by: INTERNAL MEDICINE

## 2023-09-27 PROCEDURE — 3079F PR MOST RECENT DIASTOLIC BLOOD PRESSURE 80-89 MM HG: ICD-10-PCS | Mod: HCNC,CPTII,S$GLB, | Performed by: INTERNAL MEDICINE

## 2023-09-27 PROCEDURE — 1157F ADVNC CARE PLAN IN RCRD: CPT | Mod: CPTII,S$GLB,, | Performed by: OPHTHALMOLOGY

## 2023-09-27 PROCEDURE — 99999 PR PBB SHADOW E&M-EST. PATIENT-LVL IV: ICD-10-PCS | Mod: PBBFAC,,, | Performed by: OPHTHALMOLOGY

## 2023-09-27 PROCEDURE — 99214 OFFICE O/P EST MOD 30 MIN: CPT | Mod: HCNC,S$GLB,, | Performed by: INTERNAL MEDICINE

## 2023-09-27 PROCEDURE — 1159F PR MEDICATION LIST DOCUMENTED IN MEDICAL RECORD: ICD-10-PCS | Mod: CPTII,S$GLB,, | Performed by: OPHTHALMOLOGY

## 2023-09-27 PROCEDURE — 1126F AMNT PAIN NOTED NONE PRSNT: CPT | Mod: HCNC,CPTII,S$GLB, | Performed by: INTERNAL MEDICINE

## 2023-09-27 PROCEDURE — 1157F ADVNC CARE PLAN IN RCRD: CPT | Mod: HCNC,CPTII,S$GLB, | Performed by: INTERNAL MEDICINE

## 2023-09-27 PROCEDURE — 99999 PR PBB SHADOW E&M-EST. PATIENT-LVL IV: CPT | Mod: PBBFAC,,, | Performed by: OPHTHALMOLOGY

## 2023-09-27 PROCEDURE — 3008F BODY MASS INDEX DOCD: CPT | Mod: HCNC,CPTII,S$GLB, | Performed by: INTERNAL MEDICINE

## 2023-09-27 PROCEDURE — 99024 POSTOP FOLLOW-UP VISIT: CPT | Mod: S$GLB,,, | Performed by: OPHTHALMOLOGY

## 2023-09-27 PROCEDURE — 1101F PT FALLS ASSESS-DOCD LE1/YR: CPT | Mod: HCNC,CPTII,S$GLB, | Performed by: INTERNAL MEDICINE

## 2023-09-27 PROCEDURE — 99999 PR PBB SHADOW E&M-EST. PATIENT-LVL V: ICD-10-PCS | Mod: PBBFAC,HCNC,, | Performed by: INTERNAL MEDICINE

## 2023-09-27 PROCEDURE — 3288F PR FALLS RISK ASSESSMENT DOCUMENTED: ICD-10-PCS | Mod: HCNC,CPTII,S$GLB, | Performed by: INTERNAL MEDICINE

## 2023-09-27 PROCEDURE — 99024 PR POST-OP FOLLOW-UP VISIT: ICD-10-PCS | Mod: S$GLB,,, | Performed by: OPHTHALMOLOGY

## 2023-09-27 PROCEDURE — 3288F FALL RISK ASSESSMENT DOCD: CPT | Mod: CPTII,S$GLB,, | Performed by: OPHTHALMOLOGY

## 2023-09-27 PROCEDURE — 1126F PR PAIN SEVERITY QUANTIFIED, NO PAIN PRESENT: ICD-10-PCS | Mod: HCNC,CPTII,S$GLB, | Performed by: INTERNAL MEDICINE

## 2023-09-27 PROCEDURE — 1160F PR REVIEW ALL MEDS BY PRESCRIBER/CLIN PHARMACIST DOCUMENTED: ICD-10-PCS | Mod: CPTII,S$GLB,, | Performed by: OPHTHALMOLOGY

## 2023-09-27 PROCEDURE — 4010F PR ACE/ARB THEARPY RXD/TAKEN: ICD-10-PCS | Mod: HCNC,CPTII,S$GLB, | Performed by: INTERNAL MEDICINE

## 2023-09-27 PROCEDURE — 1159F MED LIST DOCD IN RCRD: CPT | Mod: CPTII,S$GLB,, | Performed by: OPHTHALMOLOGY

## 2023-09-27 PROCEDURE — 3079F DIAST BP 80-89 MM HG: CPT | Mod: HCNC,CPTII,S$GLB, | Performed by: INTERNAL MEDICINE

## 2023-09-27 PROCEDURE — 1126F PR PAIN SEVERITY QUANTIFIED, NO PAIN PRESENT: ICD-10-PCS | Mod: CPTII,S$GLB,, | Performed by: OPHTHALMOLOGY

## 2023-09-27 PROCEDURE — 3074F PR MOST RECENT SYSTOLIC BLOOD PRESSURE < 130 MM HG: ICD-10-PCS | Mod: HCNC,CPTII,S$GLB, | Performed by: INTERNAL MEDICINE

## 2023-09-27 PROCEDURE — 99214 PR OFFICE/OUTPT VISIT, EST, LEVL IV, 30-39 MIN: ICD-10-PCS | Mod: HCNC,S$GLB,, | Performed by: INTERNAL MEDICINE

## 2023-09-27 PROCEDURE — 92136 PR OPHTHAL BIOMETRY,INTRAOC LENS POW CALC: ICD-10-PCS | Mod: 26,LT,S$GLB, | Performed by: OPHTHALMOLOGY

## 2023-09-27 PROCEDURE — 3044F HG A1C LEVEL LT 7.0%: CPT | Mod: CPTII,S$GLB,, | Performed by: OPHTHALMOLOGY

## 2023-09-27 PROCEDURE — 3074F SYST BP LT 130 MM HG: CPT | Mod: HCNC,CPTII,S$GLB, | Performed by: INTERNAL MEDICINE

## 2023-09-27 PROCEDURE — 1101F PR PT FALLS ASSESS DOC 0-1 FALLS W/OUT INJ PAST YR: ICD-10-PCS | Mod: CPTII,S$GLB,, | Performed by: OPHTHALMOLOGY

## 2023-09-27 PROCEDURE — 3044F PR MOST RECENT HEMOGLOBIN A1C LEVEL <7.0%: ICD-10-PCS | Mod: CPTII,S$GLB,, | Performed by: OPHTHALMOLOGY

## 2023-09-27 PROCEDURE — 1157F PR ADVANCE CARE PLAN OR EQUIV PRESENT IN MEDICAL RECORD: ICD-10-PCS | Mod: HCNC,CPTII,S$GLB, | Performed by: INTERNAL MEDICINE

## 2023-09-27 PROCEDURE — 3044F HG A1C LEVEL LT 7.0%: CPT | Mod: HCNC,CPTII,S$GLB, | Performed by: INTERNAL MEDICINE

## 2023-09-27 PROCEDURE — 1126F AMNT PAIN NOTED NONE PRSNT: CPT | Mod: CPTII,S$GLB,, | Performed by: OPHTHALMOLOGY

## 2023-09-27 PROCEDURE — 4010F ACE/ARB THERAPY RXD/TAKEN: CPT | Mod: HCNC,CPTII,S$GLB, | Performed by: INTERNAL MEDICINE

## 2023-09-27 PROCEDURE — 4010F ACE/ARB THERAPY RXD/TAKEN: CPT | Mod: CPTII,S$GLB,, | Performed by: OPHTHALMOLOGY

## 2023-09-27 PROCEDURE — 99999 PR PBB SHADOW E&M-EST. PATIENT-LVL V: CPT | Mod: PBBFAC,HCNC,, | Performed by: INTERNAL MEDICINE

## 2023-09-27 PROCEDURE — 1101F PT FALLS ASSESS-DOCD LE1/YR: CPT | Mod: CPTII,S$GLB,, | Performed by: OPHTHALMOLOGY

## 2023-09-27 NOTE — PROGRESS NOTES
Subjective:       Patient ID: Vivienne Jose is a 73 y.o. female.    Chief Complaint: Post-op Evaluation    HPI    72 y/o female here for 1 week Post Op OS   Pt state no complaints at this time denies f/f    Gtts;  Pred TID OD  Keto TID OD  Last edited by Hi Webster on 9/27/2023  1:31 PM.             Assessment:       1. Post-operative state    2. NS (nuclear sclerosis), left        Plan:       S/p CE OD- Doing well.     Visually significant cataract OS -Pt. Wants Sx.       Taper gtts OD.  Cataract Surgery Consent: Patient with a visually significant cataract with difficulties of ADLs, reading, driving, night vision, glare (any and all).  Discussed with Patient/Family/Caregiver: options, risks and benefits, expectations of cataract surgery, utilized an eye model with questions and answers to facilitate discussion.  Discussed lens options and patient understands that glasses may be required for optimal vision for distance and/or near vision after cataract surgery.  The Patient/Family/Caregiver  voice good understanding and patient wishes to proceed with surgery.  The patient will likely benefit from surgery and patient signed consent for Left Eye.   CE OS 10/3/23.

## 2023-09-27 NOTE — PATIENT INSTRUCTIONS
Labs every 3 months  Abdo US with AFP 12/23  EGD again 3/25  Stay off diuretics for now  Stay off BP meds  B12 ok  Agree with weight management  Return 6 months

## 2023-09-27 NOTE — PROGRESS NOTES
HEPATOLOGY FOLLOW UP    Referring Physician: Aislinn Magallon MD  Current Corresponding Physician: Aislinn Magallon MD, Hilda Odonnell MD    Vivienne Jose is here for follow up of compensated cirrhosis.    HPI  She is a 73 y.o. female with a PMHx of DM, HTN, CAD, hyperlipidemia, recurrent UTIs and obesity who had a renal stone CT and abdominal US (both 11/20) and was found to have a nodular liver c/w cirrhosis but no HCC. The patient does not drink alcohol heavily; there is no family history of chronic liver disease. HCV AB was negative in 2017. I saw her in consultation 11/25/20.    Interval HIstory  Since Vivienne Jose's last few visits:    --thought to have new a fib; eliquis recommended; ablation also recommended; however repeat holter-no evidence of a fib; eliquis not started    EGD 3/11/23: no varices  Edema, ongoing: stopped diuretics and BP medicine due to low BP; edema has not worsened   Abdo US 6/23/23: cirrhosis- no HCC    Serologic w/u for CLD:  HCV Ab-, HBsAg-, HBcAb-, HBsAb-, HAV IgG+  CHRISTINA+ (1:1280), ASMA-,   Ferritin 38, iron sat 15%  Peth negative  Alpha 1 antitrypsin level 123    Labs 9/26/23: ALT 22, AST 22, ALKP 73, Tbil 0.3, albumin 3.5    Abdomen US 12/1/22: fatty liver and no cancer  EGD 3/10/23:  no varices    MELD 3.0: 10 at 9/26/2023  7:50 AM  MELD-Na: 9 at 9/26/2023  7:50 AM  Calculated from:  Serum Creatinine: 1.3 mg/dL at 9/26/2023  7:50 AM  Serum Sodium: 139 mmol/L (Using max of 137 mmol/L) at 9/26/2023  7:50 AM  Total Bilirubin: 0.3 mg/dL (Using min of 1 mg/dL) at 9/26/2023  7:50 AM  Serum Albumin: 3.5 g/dL at 9/26/2023  7:50 AM  INR(ratio): 1.0 at 9/26/2023  7:50 AM  Age at listing (hypothetical): 73 years  Sex: Female at 9/26/2023  7:50 AM      Outpatient Encounter Medications as of 9/27/2023   Medication Sig Dispense Refill    ascorbic acid, vitamin C, (VITAMIN C) 100 MG tablet Take 500 mg by mouth 2 (two) times daily.       aspirin (ECOTRIN) 81 MG EC tablet Take 81 mg by  mouth once daily.      azelastine (ASTELIN) 137 mcg (0.1 %) nasal spray 2 sprays (274 mcg total) by Nasal route 2 (two) times daily. 120 mL 4    blood sugar diagnostic (TRUE METRIX GLUCOSE TEST STRIP) Strp TEST TWO TIMES DAILY 200 strip 6    blood-glucose meter Misc [employer] True Metrix Air meter 1 each 0    calcium carbonate-vitamin D3 600 mg(1,500mg) -100 unit Cap Take 1 tablet by mouth once daily.      estradioL (ESTRING) 2 mg (7.5 mcg /24 hour) vaginal ring Remove old Estring, place the new one every 3 months. 1 each 3    fluticasone propionate (FLONASE) 50 mcg/actuation nasal spray 2 sprays (100 mcg total) by Each Nostril route once daily. 48 g 4    gabapentin (NEURONTIN) 600 MG tablet Take 1 tablet (600 mg total) by mouth 2 (two) times daily. 180 tablet 3    glipiZIDE (GLUCOTROL) 5 MG tablet TAKE 1 TABLET TWICE DAILY BEFORE A MEAL 180 tablet 0    ipratropium (ATROVENT) 42 mcg (0.06 %) nasal spray 2 sprays by Each Nostril route 4 (four) times daily. 45 mL 4    ketorolac 0.5% (ACULAR) 0.5 % Drop Place 1 drop into the right eye 4 (four) times daily. 5 mL 1    ketotifen (ZADITOR) 0.025 % (0.035 %) ophthalmic solution Place 1-2 drops into both eyes once daily.      L.acid-B.bifidum-B.animal-FOS 25 billion cell -100 mg Cap Take 1 capsule by mouth once daily.      lancets (TRUEPLUS LANCETS) 28 gauge [facility] Inject 1 lancet into the skin 2 (two) times daily before meals. 200 each 6    levothyroxine (SYNTHROID) 75 MCG tablet TAKE 1 TABLET EVERY DAY 90 tablet 3    loratadine (CLARITIN) 10 mg tablet Take 10 mg by mouth once daily.      lovastatin (MEVACOR) 40 MG tablet TAKE 1 TABLET NIGHTLY (SUBSTITUTED FOR MEVACOR) 90 tablet 1    metFORMIN (GLUCOPHAGE) 1000 MG tablet TAKE 1 TABLET TWICE DAILY WITH MEALS 180 tablet 1    mv-mn/folic acid/vit [id number] (ALIVE ONCE DAILY WOMEN 50 PLUS ORAL) Take 1 tablet by mouth once daily.      ofloxacin (OCUFLOX) 0.3 % ophthalmic solution Place 1 drop into the right eye 4 (four) times daily.  "for 10 days 5 mL 1    omeprazole (PRILOSEC) 20 MG capsule Take 1 capsule (20 mg total) by mouth daily as needed (acid reflux). 90 capsule 3    OZEMPIC 0.25 mg or 0.5 mg (2 mg/3 mL) pen injector INJECT 0.5MG UNDER THE SKIN ONE TIME WEEKLY (Patient taking differently: Tues) 9 each 0    pen needle, diabetic (BD ULTRA-FINE EDUARDO PEN NEEDLE) 32 gauge x 5/32" Ndle USE AS DIRECTED 200 each 6    prednisoLONE acetate (PRED FORTE) 1 % DrpS Place 1 drop into the right eye 4 (four) times daily. 5 mL 1    TRESIBA FLEXTOUCH U-100 100 unit/mL (3 mL) insulin pen INJECT 38 UNITS INTO THE SKIN EVERY EVENING. (Patient taking differently: INJECT 25 UNITS INTO THE SKIN EVERY EVENING.) 45 mL 2    UNABLE TO FIND Take 1 tablet by mouth 2 (two) times a day. medication name: Magnesium 400mg, Calcium 1000 mg, D3 15mcg, Zinc 15mg      valACYclovir (VALTREX) 500 MG tablet TAKE 1 TABLET EVERY DAY 90 tablet 3    calcium carbonate (CALCIUM 500 ORAL) Take 1,000 mg by mouth.      furosemide (LASIX) 20 MG tablet Take 1 tablet (20 mg total) by mouth once daily. (Patient not taking: Reported on 9/27/2023) 30 tablet 11    semaglutide (OZEMPIC) 0.25 mg or 0.5 mg(2 mg/1.5 mL) pen injector Inject 0.5 mg into the skin every 7 days. (Patient not taking: Reported on 9/27/2023) 3 pen 4    spironolactone (ALDACTONE) 50 MG tablet Take 1 tablet (50 mg total) by mouth once daily. (Patient not taking: Reported on 9/27/2023) 30 tablet 11    traMADoL (ULTRAM) 50 mg tablet Take 1 tablet (50 mg total) by mouth every 8 (eight) hours as needed for Pain. (Patient not taking: Reported on 9/27/2023) 21 tablet 0    valsartan (DIOVAN) 160 MG tablet TAKE 1 TABLET TWICE DAILY (Patient not taking: Reported on 9/27/2023) 180 tablet 2    [DISCONTINUED] gabapentin (NEURONTIN) 600 MG tablet Take by mouth.      [DISCONTINUED] nitrofurantoin, macrocrystal-monohydrate, (MACROBID) 100 MG capsule Take 1 capsule (100 mg total) by mouth 2 (two) times daily. (Patient not taking: Reported on " 9/13/2023) 20 capsule 0    [DISCONTINUED] pregabalin (LYRICA) 75 MG capsule Take 1 capsule (75 mg total) by mouth 2 (two) times daily. 60 capsule 6     No facility-administered encounter medications on file as of 9/27/2023.     Review of patient's allergies indicates:   Allergen Reactions    Sulfa (sulfonamide antibiotics) Nausea Only    Ciprofloxacin     Januvia [sitagliptin]      abd pain    Lisinopril     Lotensin [benazepril]     Nexium [esomeprazole magnesium] Other (See Comments)     Gas     Past Medical History:   Diagnosis Date    Allergy     Angio-edema     Arthritis     Cataract     bilateral - not removed    Cerebrovascular malformation     Cirrhosis 11/24/2020    Colon polyps     Diabetes mellitus, type 2     Diabetic peripheral neuropathy     Edema of both feet 8/19/2022    GERD (gastroesophageal reflux disease)     Herpes infection     Hypothyroidism     Kidney stones     Mild nonproliferative diabetic retinopathy of 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 edema associated with type 2 diabetes mellitus 4/18/2019    DEL RIO (nonalcoholic steatohepatitis) 8/19/2022    Nausea & vomiting 11/23/2015    Obesity, morbid     Ovarian cyst     Seizure disorder, focal motor     Sleep apnea     Type II or unspecified type diabetes mellitus with neurological manifestations, uncontrolled(250.62)     Urticaria        Review of Systems   Constitutional: Negative.    HENT: Negative.     Eyes: Negative.    Respiratory: Negative.     Cardiovascular: Negative.    Gastrointestinal: Negative.    Genitourinary: Negative.    Musculoskeletal: Negative.    Skin: Negative.    Neurological: Negative.    Psychiatric/Behavioral: Negative.       Vitals:    09/27/23 1021   BP: 127/86   Pulse: (!) 53   Resp: 18   Temp: 98.2 °F (36.8 °C)         Physical Exam  Vitals reviewed.   Constitutional:       Appearance: She is well-developed.   HENT:      Head: Normocephalic and atraumatic.   Eyes:      General: No scleral icterus.     Conjunctiva/sclera:  Conjunctivae normal.      Pupils: Pupils are equal, round, and reactive to light.   Neck:      Thyroid: No thyromegaly.   Cardiovascular:      Rate and Rhythm: Normal rate and regular rhythm.      Heart sounds: Normal heart sounds.   Pulmonary:      Effort: Pulmonary effort is normal.      Breath sounds: Normal breath sounds. No rales.   Abdominal:      General: Bowel sounds are normal. There is no distension.      Palpations: Abdomen is soft. There is no mass.      Tenderness: There is no abdominal tenderness.   Musculoskeletal:         General: Swelling present. Normal range of motion.      Cervical back: Normal range of motion and neck supple.   Skin:     General: Skin is warm and dry.      Findings: No rash.   Neurological:      Mental Status: She is alert and oriented to person, place, and time.         MELD 3.0: 10 at 9/26/2023  7:50 AM  MELD-Na: 9 at 9/26/2023  7:50 AM  Calculated from:  Serum Creatinine: 1.3 mg/dL at 9/26/2023  7:50 AM  Serum Sodium: 139 mmol/L (Using max of 137 mmol/L) at 9/26/2023  7:50 AM  Total Bilirubin: 0.3 mg/dL (Using min of 1 mg/dL) at 9/26/2023  7:50 AM  Serum Albumin: 3.5 g/dL at 9/26/2023  7:50 AM  INR(ratio): 1.0 at 9/26/2023  7:50 AM  Age at listing (hypothetical): 73 years  Sex: Female at 9/26/2023  7:50 AM      Lab Results   Component Value Date     (H) 09/26/2023    BUN 19 09/26/2023    CREATININE 1.3 09/26/2023    CALCIUM 9.8 09/26/2023     09/26/2023    K 4.5 09/26/2023     09/26/2023    PROT 7.6 09/26/2023    CO2 26 09/26/2023    ANIONGAP 8 09/26/2023    WBC 10.33 09/26/2023    RBC 4.12 09/26/2023    HGB 13.2 09/26/2023    HCT 42.7 09/26/2023     (H) 09/26/2023    MCH 32.0 (H) 09/26/2023    MCHC 30.9 (L) 09/26/2023     Lab Results   Component Value Date    RDW 14.6 (H) 09/26/2023     09/26/2023    MPV 10.7 09/26/2023    GRAN 6.1 09/26/2023    GRAN 58.7 09/26/2023    LYMPH 2.7 09/26/2023    LYMPH 26.5 09/26/2023    MONO 1.0 09/26/2023     MONO 9.8 09/26/2023    EOSINOPHIL 4.1 09/26/2023    BASOPHIL 0.5 09/26/2023    EOS 0.4 09/26/2023    BASO 0.05 09/26/2023    APTT 25.6 07/27/2011    GROUPTRH O POS 11/29/2007    CHOL 130 08/17/2023    TRIG 133 08/17/2023    HDL 40 08/17/2023    CHOLHDL 30.8 08/17/2023    TOTALCHOLEST 3.3 08/17/2023    ALBUMIN 3.5 09/26/2023    BILIDIR 0.2 09/26/2023    AST 22 09/26/2023    ALT 22 09/26/2023    ALKPHOS 73 09/26/2023    MG 1.9 02/09/2023    LABPROT 10.8 09/26/2023    INR 1.0 09/26/2023       Assessment and Plan:  Vivienne Jose is a 73 y.o. female with decompensated cirrhosis. Current recommendations:  1. Cirrhosis, decompensated: labs every 12 weeks; HCC screening in 6 months (12/23);   2. Recurrent UTIs: monitor  3. Edema: diuretics off- monitor  4. Af fib: not diagnosed with afib; no need for eliquis  5. Portal HTN: repeat EGD 3/25  6. Hypotension on diuretics and antihypertensive- normal BP off these meds- monitor  7. Elevated BMI: agree with referral to weight managemant    Return 6 months

## 2023-09-30 ENCOUNTER — TELEPHONE (OUTPATIENT)
Dept: HEPATOLOGY | Facility: CLINIC | Age: 73
End: 2023-09-30
Payer: MEDICARE

## 2023-09-30 NOTE — TELEPHONE ENCOUNTER
Call placed to the patient at [phone].  Patient received message from Dr Christianson on 9/21 to Hold diuretics.  Labs were repeated and had an Office Visit with Dr Christianson.  Instructed to remain off of the diuretics.   Patient stated my next labs are in 3 months.  Patient will repeat Labd on Wed 10/4/23 after she has her Post Op Eye Appt completed at [place].  Verbalized understanding.

## 2023-09-30 NOTE — H&P
[facility]  History & Physical    Subjective:      Chief Complaint/Reason for Admission:     Vivienne Jose is a 73 y.o. female.    Past Medical History:   Diagnosis Date    Allergy     Angio-edema     Arthritis     Cataract     bilateral - not removed    Cerebrovascular malformation     Cirrhosis 11/24/2020    Colon polyps     Diabetes mellitus, type 2     Diabetic peripheral neuropathy     Edema of both feet 8/19/2022    GERD (gastroesophageal reflux disease)     Herpes infection     Hypothyroidism     Kidney stones     Mild nonproliferative diabetic retinopathy of 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 edema associated with type 2 diabetes mellitus 4/18/2019    DEL RIO (nonalcoholic steatohepatitis) 8/19/2022    Nausea & vomiting 11/23/2015    Obesity, morbid     Ovarian cyst     Seizure disorder, focal motor     Sleep apnea     Type II or unspecified type diabetes mellitus with neurological manifestations, uncontrolled(250.62)     Urticaria      Past Surgical History:   Procedure Laterality Date    CARPAL TUNNEL RELEASE Right 2014    CATARACT EXTRACTION W/  INTRAOCULAR LENS IMPLANT Right 9/19/2023    Procedure: EXTRACTION, CATARACT, WITH IOL INSERTION;  Surgeon: Lyndon Ortiz MD;  Location: [facility];  Service: Ophthalmology;  Laterality: Right;    COLONOSCOPY      COLONOSCOPY N/A 9/13/2017    Procedure: COLONOSCOPY Golytely;  Surgeon: Gisela Wall MD;  Location: [facility];  Service: Endoscopy;  Laterality: N/A;    COLONOSCOPY N/A 9/9/2022    Procedure: COLONOSCOPY Extended Suprep;  Surgeon: Britt Jasso MD;  Location: [facility];  Service: Endoscopy;  Laterality: N/A;    CYST REMOVAL      skin; multiples    ESOPHAGOGASTRODUODENOSCOPY Left 10/15/2021    Procedure: EGD (ESOPHAGOGASTRODUODENOSCOPY);  Surgeon: Luis Fernando Taveras MD;  Location: [address]);  Service: Endoscopy;  Laterality: Left;  cirrhosis labwork am of procedure  last seizure 1973  COVID test on  10/12/21 at [facility]    ESOPHAGOGASTRODUODENOSCOPY N/A 3/10/2023    Procedure: EGD (ESOPHAGOGASTRODUODENOSCOPY);  Surgeon: Christa Caldera MD;  Location: [facility] ([address] [place] [address]);  Service: Endoscopy;  Laterality: N/A;  Medically Urgent  cirrohsis-labs done on 2/9/23 (< 90 days)  New onset A-fib  3/1 instructions to portal-st  Precall complete- KS    EXTRACORPOREAL SHOCK WAVE LITHOTRIPSY  2002    HYSTERECTOMY  1984    JOINT REPLACEMENT Right 03/06/2019    knee    KIDNEY STONE SURGERY      KNEE ARTHROPLASTY Right 3/6/2019    Procedure: ARTHROPLASTY, KNEE;  Surgeon: Pj Gamboa MD;  Location: [facility];  Service: Orthopedics;  Laterality: Right;  Depuy (Stephen notified 2/21, CC)    THYROIDECTOMY  1977         TONSILLECTOMY, ADENOIDECTOMY      TRIGGER FINGER RELEASE      TUBAL LIGATION       Family History   Problem Relation Age of Onset    Skin cancer Mother     Macular degeneration Mother     Dementia Mother     Hypertension Mother     Cancer Father     Arthritis Father     Gout Father     No Known Problems Daughter     Diabetes Daughter     Hypertension Daughter     Arthritis Daughter     Allergies Son     Allergic rhinitis Son     Glaucoma Maternal Aunt         Great Maternal Aunt    Leukemia Maternal Uncle     Amblyopia Neg Hx     Cataracts Neg Hx     Retinal detachment Neg Hx     Strabismus Neg Hx     Stroke Neg Hx     Thyroid disease Neg Hx     Kidney disease Neg Hx     Angioedema Neg Hx     Asthma Neg Hx     Atopy Neg Hx     Eczema Neg Hx     Immunodeficiency Neg Hx     Rhinitis Neg Hx     Urticaria Neg Hx      Social History     Tobacco Use    Smoking status: Never    Smokeless tobacco: Never   Substance Use Topics    Alcohol use: No     Alcohol/week: 0.0 standard drinks of alcohol    Drug use: No       No medications prior to admission.     Review of patient's allergies indicates:   Allergen Reactions    Sulfa (sulfonamide antibiotics) Nausea Only    Ciprofloxacin     Januvia [sitagliptin]      abd pain     Lisinopril     Lotensin [benazepril]     Nexium [esomeprazole magnesium] Other (See Comments)     Gas        Review of Systems   Eyes:  Positive for blurred vision.   All other systems reviewed and are negative.      Objective:      Vital Signs (Most Recent)       Vital Signs Range (Last 24H):  BP: ()/()   Arterial Line BP: ()/()     Physical Exam  Constitutional:       Appearance: She is well-developed.   HENT:      Head: Normocephalic.   Eyes:      Conjunctiva/sclera: Conjunctivae normal.      Pupils: Pupils are equal, round, and reactive to light.   Cardiovascular:      Rate and Rhythm: Normal rate and regular rhythm.      Heart sounds: Normal heart sounds.   Pulmonary:      Effort: Pulmonary effort is normal.      Breath sounds: Normal breath sounds.   Abdominal:      General: Bowel sounds are normal.      Palpations: Abdomen is soft.   Musculoskeletal:         General: Normal range of motion.      Cervical back: Normal range of motion and neck supple.   Skin:     General: Skin is warm.   Neurological:      Mental Status: She is alert and oriented to person, place, and time.         Data Review:     ECG:     Assessment:      Cataract OS.    Plan:    CE OS.

## 2023-09-30 NOTE — TELEPHONE ENCOUNTER
----- Message from Nell Christianson MD sent at 9/20/2023  8:05 PM CDT -----  Please call pt and have her hold diuretics since creatinine is up. Repeat labs on Tuesday next week  MELD 3.0: 12

## 2023-10-02 ENCOUNTER — TELEPHONE (OUTPATIENT)
Dept: NEUROSURGERY | Facility: CLINIC | Age: 73
End: 2023-10-02
Payer: MEDICARE

## 2023-10-02 ENCOUNTER — PATIENT MESSAGE (OUTPATIENT)
Dept: PREADMISSION TESTING | Facility: HOSPITAL | Age: 73
End: 2023-10-02
Payer: MEDICARE

## 2023-10-02 ENCOUNTER — ANESTHESIA EVENT (OUTPATIENT)
Dept: SURGERY | Facility: HOSPITAL | Age: 73
End: 2023-10-02
Payer: MEDICARE

## 2023-10-02 NOTE — ANESTHESIA PREPROCEDURE EVALUATION
[facility]  Anesthesia Pre-Operative Evaluation         Patient Name: Vivienne Jose  YOB: 1950  MRN: [id number]    SUBJECTIVE:     10/02/2023    Procedure(s) (LRB):  EXTRACTION, CATARACT, WITH IOL INSERTION (Left)    Vivienne Jose is a 73 y.o. female here for Procedure(s) (LRB):  EXTRACTION, CATARACT, WITH IOL INSERTION (Left)    Drips:     Patient Active Problem List   Diagnosis    Trigger middle finger of right hand    Kidney stones    Essential hypertension    CAD (coronary artery disease)    Chronic allergic rhinitis    Nuclear sclerosis - Both Eyes    Carpal tunnel syndrome of right wrist    Proteinuria    BAM (obstructive sleep apnea)    Sacroiliitis    Facet arthritis of lumbar region    Physical deconditioning    Osteopenia    Vitamin D deficiency    Hypocalcemia    Morbid obesity with body mass index (BMI) of 40.0 to 44.9 in adult    Recurrent HSV (herpes simplex virus)    Chronic pain of right knee    Gastroesophageal reflux disease    Pure hypercholesterolemia    Nephrolithiasis    Hypokalemia    Diabetic polyneuropathy associated with type 2 diabetes mellitus    Diverticulosis of intestine without bleeding    Skin nodule    Facet arthritis of cervical region    Bilateral carotid artery disease    Type 2 diabetes mellitus with stage 3a chronic kidney disease, with long-term current use of insulin    Interstitial cystitis    S/P total knee arthroplasty, right    Decreased range of motion of right knee    Muscle weakness of lower extremity    Mild nonproliferative diabetic retinopathy of 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 edema associated with type 2 diabetes mellitus    Hypothyroidism    Thyroid nodule    Other cirrhosis of liver    History of recurrent UTI (urinary tract infection)    De Quervain's tenosynovitis, left    DEL RIO (nonalcoholic steatohepatitis)     Edema leg    Arrhythmia    Abdominal aortic atherosclerosis    Purpura    Nuclear sclerotic cataract of right eye       Review of patient's allergies indicates:   Allergen Reactions    Sulfa (sulfonamide antibiotics) Nausea Only    Ciprofloxacin     Januvia [sitagliptin]      abd pain    Lisinopril     Lotensin [benazepril]     Nexium [esomeprazole magnesium] Other (See Comments)     Gas       No current facility-administered medications on file prior to encounter.     Current Outpatient Medications on File Prior to Encounter   Medication Sig Dispense Refill    ascorbic acid, vitamin C, (VITAMIN C) 100 MG tablet Take 500 mg by mouth 2 (two) times daily.       aspirin (ECOTRIN) 81 MG EC tablet Take 81 mg by mouth once daily.      azelastine (ASTELIN) 137 mcg (0.1 %) nasal spray 2 sprays (274 mcg total) by Nasal route 2 (two) times daily. 120 mL 4    blood sugar diagnostic (TRUE METRIX GLUCOSE TEST STRIP) Strp TEST TWO TIMES DAILY 200 strip 6    blood-glucose meter Misc [employer] True Metrix Air meter 1 each 0    calcium carbonate (CALCIUM 500 ORAL) Take 1,000 mg by mouth.      calcium carbonate-vitamin D3 600 mg(1,500mg) -100 unit Cap Take 1 tablet by mouth once daily.      estradioL (ESTRING) 2 mg (7.5 mcg /24 hour) vaginal ring Remove old Estring, place the new one every 3 months. 1 each 3    fluticasone propionate (FLONASE) 50 mcg/actuation nasal spray 2 sprays (100 mcg total) by Each Nostril route once daily. 48 g 4    furosemide (LASIX) 20 MG tablet Take 1 tablet (20 mg total) by mouth once daily. 30 tablet 11    glipiZIDE (GLUCOTROL) 5 MG tablet TAKE 1 TABLET TWICE DAILY BEFORE A MEAL 180 tablet 0    ipratropium (ATROVENT) 42 mcg (0.06 %) nasal spray 2 sprays by Each Nostril route 4 (four) times daily. 45 mL 4    ketotifen (ZADITOR) 0.025 % (0.035 %) ophthalmic solution Place 1-2 drops into both eyes once daily.      L.acid-B.bifidum-B.animal-FOS 25 billion cell -100 mg Cap Take 1 capsule  "by mouth once daily.      lancets (TRUEPLUS LANCETS) 28 gauge Misc Inject 1 lancet into the skin 2 (two) times daily before meals. 200 each 6    levothyroxine (SYNTHROID) 75 MCG tablet TAKE 1 TABLET EVERY DAY 90 tablet 3    loratadine (CLARITIN) 10 mg tablet Take 10 mg by mouth once daily.      lovastatin (MEVACOR) 40 MG tablet TAKE 1 TABLET NIGHTLY (SUBSTITUTED FOR MEVACOR) 90 tablet 1    metFORMIN (GLUCOPHAGE) 1000 MG tablet TAKE 1 TABLET TWICE DAILY WITH MEALS 180 tablet 1    mv-mn/folic acid/vit [id number] (ALIVE ONCE DAILY WOMEN 50 PLUS ORAL) Take 1 tablet by mouth once daily.      omeprazole (PRILOSEC) 20 MG capsule Take 1 capsule (20 mg total) by mouth daily as needed (acid reflux). 90 capsule 3    pen needle, diabetic (BD ULTRA-FINE EDUARDO PEN NEEDLE) 32 gauge x 5/32" Ndle USE AS DIRECTED 200 each 6    semaglutide (OZEMPIC) 0.25 mg or 0.5 mg(2 mg/1.5 mL) pen injector Inject 0.5 mg into the skin every 7 days. 3 pen 4    spironolactone (ALDACTONE) 50 MG tablet Take 1 tablet (50 mg total) by mouth once daily. 30 tablet 11    TRESIBA FLEXTOUCH U-100 100 unit/mL (3 mL) insulin pen INJECT 38 UNITS INTO THE SKIN EVERY EVENING. (Patient taking differently: INJECT 25 UNITS INTO THE SKIN EVERY EVENING.) 45 mL 2    UNABLE TO FIND Take 1 tablet by mouth 2 (two) times a day. medication name: Magnesium 400mg, Calcium 1000 mg, D3 15mcg, Zinc 15mg      valACYclovir (VALTREX) 500 MG tablet TAKE 1 TABLET EVERY DAY 90 tablet 3    valsartan (DIOVAN) 160 MG tablet TAKE 1 TABLET TWICE DAILY 180 tablet 2       Past Surgical History:   Procedure Laterality Date    CARPAL TUNNEL RELEASE Right 2014    CATARACT EXTRACTION W/  INTRAOCULAR LENS IMPLANT Right 9/19/2023    Procedure: EXTRACTION, CATARACT, WITH IOL INSERTION;  Surgeon: Lyndon Ortiz MD;  Location[facility];  Service: Ophthalmology;  Laterality: Right;    COLONOSCOPY      COLONOSCOPY N/A 9/13/2017    Procedure: COLONOSCOPY Nalini;  Surgeon: " Gisela Wall MD;  Location: [facility];  Service: Endoscopy;  Laterality: N/A;    COLONOSCOPY N/A 9/9/2022    Procedure: COLONOSCOPY Extended Suprep;  Surgeon: Britt Jasso MD;  Location: [facility];  Service: Endoscopy;  Laterality: N/A;    CYST REMOVAL      skin; multiples    ESOPHAGOGASTRODUODENOSCOPY Left 10/15/2021    Procedure: EGD (ESOPHAGOGASTRODUODENOSCOPY);  Surgeon: Luis Fernando Taveras MD;  Location: [facility] (4TH FLR);  Service: Endoscopy;  Laterality: Left;  cirrhosis labwork am of procedure  last seizure 1973  C[facility] 10/12/[facility]    ESOPHAGOGASTRODUODENOSCOPY N/A 3/10/2023    Procedure: EGD (ESOPHAGOGASTRODUODENOSCOPY);  Surgeon: Christa Caldera MD;  Location: [facility] (2ND FLR);  Service: Endoscopy;  Laterality: N/A;  Medically Urgent  cirrohsis-labs done on 2/9/23 (< 90 days)  New onset A-fib  3/1 instructions to portal-st  Precall complete- [place]    EXTRACORPOREAL SHOCK WAVE LITHOTRIPSY  2002    HYSTERECTOMY  1984    JOINT REPLACEMENT Right 03/06/2019    knee    KIDNEY STONE SURGERY      KNEE ARTHROPLASTY Right 3/6/2019    Procedure: ARTHROPLASTY, KNEE;  Surgeon: Pj Gamboa MD;  Location: [facility];  Service: Orthopedics;  Laterality: Right;  Depuy (Stephen notified 2/21, CC)    THYROIDECTOMY  1977         TONSILLECTOMY, ADENOIDECTOMY      TRIGGER FINGER RELEASE      TUBAL LIGATION         Social History     Socioeconomic History    Marital status:    Occupational History    Occupation: retired     Employer: [employer]   Tobacco Use    Smoking status: Never    Smokeless tobacco: Never   Substance and Sexual Activity    Alcohol use: No     Alcohol/week: 0.0 standard drinks of alcohol    Drug use: No    Sexual activity: Yes     Partners: Male     Social Determinants of Health     Financial Resource Strain: Low Risk  (9/6/2023)    Overall Financial Resource Strain (CARDIA)     Difficulty of Paying Living Expenses: Not hard at all    Food Insecurity: No Food Insecurity (9/6/2023)    Hunger Vital Sign     Worried About Running Out of Food in the Last Year: Never true     Ran Out of Food in the Last Year: Never true   Transportation Needs: No Transportation Needs (9/6/2023)    PRAPARE - Transportation     Lack of Transportation (Medical): No     Lack of Transportation (Non-Medical): No   Physical Activity: Inactive (9/6/2023)    Exercise Vital Sign     Days of Exercise per Week: 0 days     Minutes of Exercise per Session: 0 min   Stress: No Stress Concern Present (9/6/2023)    [place] [employer] - Occupational Stress Questionnaire     Feeling of Stress : Only a little   Social Connections: Unknown (9/6/2023)    Social Connection and Isolation Panel [NHANES]     Frequency of Communication with Friends and Family: Patient refused     Frequency of Social Gatherings with Friends and Family: Patient refused     Active Member of Clubs or Organizations: No     Attends Club or Organization Meetings: Patient refused     Marital Status:    Housing Stability: Low Risk  (9/6/2023)    Housing Stability Vital Sign     Unable to Pay for Housing in the Last Year: No     Number of Places Lived in the Last Year: 1     Unstable Housing in the Last Year: No         OBJECTIVE:     Vital Signs Range (Last 24H):       Significant Labs:  Lab Results   Component Value Date    WBC 10.33 09/26/2023    HGB 13.2 09/26/2023    HCT 42.7 09/26/2023     09/26/2023    CHOL 130 08/17/2023    TRIG 133 08/17/2023    HDL 40 08/17/2023    ALT 22 09/26/2023    AST 22 09/26/2023     09/26/2023    K 4.5 09/26/2023     09/26/2023    CREATININE 1.3 09/26/2023    BUN 19 09/26/2023    CO2 26 09/26/2023    TSH 1.799 02/09/2023    INR 1.0 09/26/2023    HGBA1C 6.7 (H) 08/17/2023       Diagnostic Studies:    EKG:   Results for orders placed or performed in visit on 12/01/22   EKG 12-lead    Collection Time: 12/01/22  8:44 AM     Narrative    Test Reason :     Vent. Rate : 046 BPM     Atrial Rate : 394 BPM     P-R Int : 000 ms          QRS Dur : 126 ms      QT Int : 468 ms       P-R-T Axes : 000 -36 021 degrees     QTc Int : 409 ms    Atrial fibrillation with slow ventricular response  Left axis deviation  Right bundle branch block  Abnormal ECG  When compared with ECG of 20-FEB-2019 13:24,  Atrial fibrillation has replaced Sinus rhythm  Vent. rate has decreased BY  37 BPM  QT has shortened  Confirmed by Kilo BEATTY, Chirag ZACARIAS ([id number]) on 12/5/2022 3:54:58 PM    Referred By:  phuc           Confirmed By:Chirag Boateng MD       2D ECHO:  TTE:  No results found. However, due to the size of the patient record, not all encounters were searched. Please check Results Review for a complete set of results.  Results for orders placed or performed during the hospital encounter of 12/07/22   Echo   Result Value Ref Range    BSA 2.19 m2    TDI SEPTAL 0.06 m/s    LV LATERAL E/E' RATIO 9.75 m/s    LV SEPTAL E/E' RATIO 13.00 m/s    LA WIDTH 3.40 cm    Left Ventricular Outflow Tract Mean Velocity 0.62 cm/s    Left Ventricular Outflow Tract Mean Gradient 1.83 mmHg    Pulmonary Valve Mean Velocity 0.93 m/s    AORTIC VALVE CUSP SEPERATION 1.88 cm    TDI LATERAL 0.08 m/s    PV PEAK VELOCITY 1.27 cm/s    LVIDd 5.06 3.5 - 6.0 cm    IVS 1.09 0.6 - 1.1 cm    Posterior Wall 1.13 (A) 0.6 - 1.1 cm    Ao root annulus 4.01 cm    LVIDs 3.39 2.1 - 4.0 cm    FS 33 28 - 44 %    LA volume 55.06 cm3    LV mass 213.82 g    LA size 3.88 cm    RVDD 2.51 cm    TAPSE 1.75 cm    Left Ventricle Relative Wall Thickness 0.45 cm    AV mean gradient 3 mmHg    AV valve area 2.54 cm2    AV Velocity Ratio 0.84     AV index (prosthetic) 0.85     MV mean gradient 1 mmHg    MV valve area p 1/2 method 2.58 cm2    MV valve area by continuity eq 2.61 cm2    E/A ratio 1.10     Mean e' 0.07 m/s    E wave deceleration time 294.43 msec    IVRT 66.60 msec    LVOT diameter 1.95 cm    LVOT area 3.0 cm2     LVOT peak gutierrez 0.98 m/s    LVOT peak VTI 19.90 cm    Ao peak gutierrez 1.17 m/s    Ao VTI 23.4 cm    LVOT stroke volume 59.40 cm3    AV peak gradient 5 mmHg    MV peak gradient 2 mmHg    E/E' ratio 11.14 m/s    MV Peak E Gutierrez 0.78 m/s    TR Max Gutierrez 2.29 m/s    MV VTI 22.8 cm    MV stenosis pressure 1/2 time 85.38 ms    MV Peak A Gutierrez 0.71 m/s    LV Systolic Volume 46.99 mL    LV Systolic Volume Index 22.7 mL/m2    LV Diastolic Volume 121.82 mL    LV Diastolic Volume Index 58.85 mL/m2    LA Volume Index 26.6 mL/m2    LV Mass Index 103 g/m2    Left Atrium Minor Axis 5.14 cm    Left Atrium Major Axis 4.70 cm    Triscuspid Valve Regurgitation Peak Gradient 21 mmHg    LA Volume Index (Mod) 18.3 mL/m2    LA volume (mod) 37.93 cm3    Right Atrial Pressure (from IVC) 8 mmHg    EF 60 %    TV resting pulmonary artery pressure 29 mmHg    Narrative    · The left ventricle is normal in size with normal systolic function.  · The estimated ejection fraction is 60%.  · Normal left ventricular diastolic function.  · Mild mitral regurgitation.  · Mild tricuspid regurgitation.  · Normal right ventricular size with normal right ventricular systolic   function.  · There is no pulmonary hypertension.              Pre-op Assessment    I have reviewed the Patient Summary Reports.     I have reviewed the Nursing Notes. I have reviewed the NPO Status.   I have reviewed the Medications.     Review of Systems  Anesthesia Hx:  Motion sickness and nausea after anesthesia  History of prior surgery of interest to airway management or planning: Denies Family Hx of Anesthesia complications.  Personal Hx of Anesthesia complications, Post-Operative Nausea/Vomiting   Hematology/Oncology:  Hematology Normal        EENT/Dental:   Eyes:   Cardiovascular:   Hypertension CAD  Dysrhythmias  PVD NYHA Classification II Carotid stenosis  PVCs  RBBB   Pulmonary:   Sleep Apnea, CPAP    Education provided regarding risk of obstructive sleep apnea     Renal/:   Chronic  Renal Disease renal calculi    Hepatic/GI:   GERD (uses medication every other day), well controlled Liver Disease, Hepatitis    Musculoskeletal:   Arthritis     Neurological:   Neuromuscular Disease, Seizures Motion sickness   Endocrine:   Diabetes, type 2 Hypothyroidism Off ozempic x 2 weeks Morbid Obesity / BMI > 40  Psych:  Psychiatric Normal         Type II or unspecified type diabetes mellitus with neurological manifestations, uncontrolled(250.62) GERD (gastroesophageal reflux disease)   Allergy Sleep apnea   Hypothyroidism Seizure disorder, focal motor   Obesity, morbid Ovarian cyst   Kidney stones Cerebrovascular malformation   Herpes infection Angio-edema   Urticaria Cataract   Arthritis Diabetic peripheral neuropathy   Colon polyps Nausea & vomiting   Diabetes mellitus, type 2 Mild nonproliferative diabetic retinopathy of 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 edema associated with type 2 diabetes mellitus   Cirrhosis DEL RIO (nonalcoholic steatohepatitis)   Edema of both feet      Surgical History    THYROIDECTOMY CARPAL TUNNEL RELEASE   CYST REMOVAL TUBAL LIGATION   COLONOSCOPY TONSILLECTOMY, ADENOIDECTOMY   EXTRACORPOREAL SHOCK WAVE LITHOTRIPSY COLONOSCOPY   TRIGGER FINGER RELEASE KIDNEY STONE SURGERY   KNEE ARTHROPLASTY JOINT REPLACEMENT   HYSTERECTOMY ESOPHAGOGASTRODUODENOSCOPY   COLONOSCOPY ESOPHAGOGASTRODUODENOSCOPY     COVID-19 Risk of Complications        Physical Exam  General: Well nourished, Cooperative, Alert and Oriented    Airway:  Mallampati: III   Mouth Opening: Normal  TM Distance: < 4 cm  Tongue: Normal  Neck: Girth Increased    Dental:  Intact        Anesthesia Plan  Type of Anesthesia, risks & benefits discussed:    Anesthesia Type: MAC  Intra-op Monitoring Plan: Standard ASA Monitors  Post Op Pain Control Plan: multimodal analgesia  Induction:  IV  Informed Consent: Informed consent signed with the Patient and all parties understand the risks and agree with anesthesia plan.  All questions answered.    ASA Score: 3  Day of Surgery Review of History & Physical: H&P Update referred to the surgeon/provider.  Anesthesia Plan Notes:   Will give ondansetron per pt request.  It will also be available in the recovery area.     Ready For Surgery From Anesthesia Perspective.     .

## 2023-10-02 NOTE — TELEPHONE ENCOUNTER
Returned pt's call, pt stated that she had a total knee replacement in 2019 with . She was told that she could not have an MRI. I stated to pt that I will send a message to  and  about this issue and I will contact her back. Pt voiced understanding

## 2023-10-03 ENCOUNTER — HOSPITAL ENCOUNTER (OUTPATIENT)
Facility: HOSPITAL | Age: 73
Discharge: HOME | End: 2023-10-03
Attending: OPHTHALMOLOGY | Admitting: OPHTHALMOLOGY
Payer: MEDICARE

## 2023-10-03 ENCOUNTER — ANESTHESIA (OUTPATIENT)
Dept: SURGERY | Facility: HOSPITAL | Age: 73
End: 2023-10-03
Payer: MEDICARE

## 2023-10-03 VITALS
OXYGEN SATURATION: 97 % | HEIGHT: 62 IN | TEMPERATURE: 98 F | HEART RATE: 61 BPM | SYSTOLIC BLOOD PRESSURE: 141 MMHG | WEIGHT: 244 LBS | DIASTOLIC BLOOD PRESSURE: 65 MMHG | BODY MASS INDEX: 44.9 KG/M2 | RESPIRATION RATE: 16 BRPM

## 2023-10-03 DIAGNOSIS — H25.12 NUCLEAR SCLEROTIC CATARACT OF LEFT EYE: Primary | ICD-10-CM

## 2023-10-03 LAB — POCT GLUCOSE: 122 MG/DL (ref 70–110)

## 2023-10-03 PROCEDURE — 36000707: Performed by: OPHTHALMOLOGY

## 2023-10-03 PROCEDURE — 63600175 PHARM REV CODE 636 W HCPCS: Performed by: OPHTHALMOLOGY

## 2023-10-03 PROCEDURE — 66984 XCAPSL CTRC RMVL W/O ECP: CPT | Mod: 79,HCNC,LT, | Performed by: OPHTHALMOLOGY

## 2023-10-03 PROCEDURE — 63600175 PHARM REV CODE 636 W HCPCS: Performed by: NURSE ANESTHETIST, CERTIFIED REGISTERED

## 2023-10-03 PROCEDURE — 27201423 OPTIME MED/SURG SUP & DEVICES STERILE SUPPLY: Performed by: OPHTHALMOLOGY

## 2023-10-03 PROCEDURE — 99900035 HC TECH TIME PER 15 MIN (STAT)

## 2023-10-03 PROCEDURE — 36000706: Performed by: OPHTHALMOLOGY

## 2023-10-03 PROCEDURE — D9220A PRA ANESTHESIA: Mod: ,,, | Performed by: NURSE ANESTHETIST, CERTIFIED REGISTERED

## 2023-10-03 PROCEDURE — 37000009 HC ANESTHESIA EA ADD 15 MINS: Performed by: OPHTHALMOLOGY

## 2023-10-03 PROCEDURE — 82962 GLUCOSE BLOOD TEST: CPT | Performed by: OPHTHALMOLOGY

## 2023-10-03 PROCEDURE — 25000003 PHARM REV CODE 250

## 2023-10-03 PROCEDURE — V2632 POST CHMBR INTRAOCULAR LENS: HCPCS | Performed by: OPHTHALMOLOGY

## 2023-10-03 PROCEDURE — 37000008 HC ANESTHESIA 1ST 15 MINUTES: Performed by: OPHTHALMOLOGY

## 2023-10-03 PROCEDURE — 25000003 PHARM REV CODE 250: Performed by: OPHTHALMOLOGY

## 2023-10-03 PROCEDURE — 71000015 HC POSTOP RECOV 1ST HR: Performed by: OPHTHALMOLOGY

## 2023-10-03 PROCEDURE — D9220A PRA ANESTHESIA: ICD-10-PCS | Mod: ,,, | Performed by: NURSE ANESTHETIST, CERTIFIED REGISTERED

## 2023-10-03 PROCEDURE — 66984 PR REMOVAL, CATARACT, W/INSRT INTRAOC LENS, W/O ENDO CYCLO: ICD-10-PCS | Mod: 79,HCNC,LT, | Performed by: OPHTHALMOLOGY

## 2023-10-03 PROCEDURE — 94761 N-INVAS EAR/PLS OXIMETRY MLT: CPT

## 2023-10-03 DEVICE — LENS CLAREON AUTONOME 18.5D: Type: IMPLANTABLE DEVICE | Site: EYE | Status: FUNCTIONAL

## 2023-10-03 RX ORDER — MIDAZOLAM HYDROCHLORIDE 1 MG/ML
INJECTION INTRAMUSCULAR; INTRAVENOUS
Status: DISCONTINUED | OUTPATIENT
Start: 2023-10-03 | End: 2023-10-03

## 2023-10-03 RX ORDER — HYDROCODONE BITARTRATE AND ACETAMINOPHEN 5; 325 MG/1; MG/1
1 TABLET ORAL EVERY 4 HOURS PRN
Status: DISCONTINUED | OUTPATIENT
Start: 2023-10-03 | End: 2023-10-03 | Stop reason: HOSPADM

## 2023-10-03 RX ORDER — SODIUM CHLORIDE 9 MG/ML
INJECTION, SOLUTION INTRAVENOUS CONTINUOUS
Status: DISCONTINUED | OUTPATIENT
Start: 2023-10-03 | End: 2023-10-03 | Stop reason: HOSPADM

## 2023-10-03 RX ORDER — MOXIFLOXACIN 5 MG/ML
SOLUTION/ DROPS OPHTHALMIC
Status: DISCONTINUED | OUTPATIENT
Start: 2023-10-03 | End: 2023-10-03 | Stop reason: HOSPADM

## 2023-10-03 RX ORDER — EPINEPHRINE 1 MG/ML
INJECTION, SOLUTION, CONCENTRATE INTRAVENOUS
Status: DISCONTINUED | OUTPATIENT
Start: 2023-10-03 | End: 2023-10-03 | Stop reason: HOSPADM

## 2023-10-03 RX ORDER — MOXIFLOXACIN 5 MG/ML
1 SOLUTION/ DROPS OPHTHALMIC
Status: COMPLETED | OUTPATIENT
Start: 2023-10-03 | End: 2023-10-03

## 2023-10-03 RX ORDER — CYCLOP/TROP/PROPA/PHEN/KET/WAT 1-1-0.1%
1 DROPS (EA) OPHTHALMIC (EYE)
Status: COMPLETED | OUTPATIENT
Start: 2023-10-03 | End: 2023-10-03

## 2023-10-03 RX ORDER — TETRACAINE HYDROCHLORIDE 5 MG/ML
SOLUTION OPHTHALMIC
Status: DISCONTINUED | OUTPATIENT
Start: 2023-10-03 | End: 2023-10-03 | Stop reason: HOSPADM

## 2023-10-03 RX ORDER — ONDANSETRON 2 MG/ML
INJECTION INTRAMUSCULAR; INTRAVENOUS
Status: DISCONTINUED | OUTPATIENT
Start: 2023-10-03 | End: 2023-10-03

## 2023-10-03 RX ORDER — LIDOCAINE HYDROCHLORIDE 40 MG/ML
INJECTION, SOLUTION RETROBULBAR
Status: DISCONTINUED | OUTPATIENT
Start: 2023-10-03 | End: 2023-10-03 | Stop reason: HOSPADM

## 2023-10-03 RX ORDER — ONDANSETRON 2 MG/ML
4 INJECTION INTRAMUSCULAR; INTRAVENOUS DAILY PRN
Status: DISCONTINUED | OUTPATIENT
Start: 2023-10-03 | End: 2023-10-03 | Stop reason: HOSPADM

## 2023-10-03 RX ORDER — PREDNISOLONE ACETATE 10 MG/ML
SUSPENSION/ DROPS OPHTHALMIC
Status: DISCONTINUED | OUTPATIENT
Start: 2023-10-03 | End: 2023-10-03 | Stop reason: HOSPADM

## 2023-10-03 RX ORDER — ACETAMINOPHEN 325 MG/1
650 TABLET ORAL EVERY 4 HOURS PRN
Status: DISCONTINUED | OUTPATIENT
Start: 2023-10-03 | End: 2023-10-03 | Stop reason: HOSPADM

## 2023-10-03 RX ADMIN — MOXIFLOXACIN OPHTHALMIC 1 DROP: 5 SOLUTION/ DROPS OPHTHALMIC at 06:10

## 2023-10-03 RX ADMIN — MIDAZOLAM HYDROCHLORIDE 2 MG: 1 INJECTION INTRAMUSCULAR; INTRAVENOUS at 07:10

## 2023-10-03 RX ADMIN — ONDANSETRON 4 MG: 2 INJECTION INTRAMUSCULAR; INTRAVENOUS at 07:10

## 2023-10-03 RX ADMIN — Medication 1 DROP: at 06:10

## 2023-10-03 NOTE — PLAN OF CARE
Discharge instructions given and explained to patient and family with verbalization of understanding all instructions. Patient is AAOx3,v/s stable, denies n/v and tolerating po, rates pain level tolerable, IV removed, and family at bedside. Patient discharged to home. Patient transported off unit via wheelchair to private vehicle with family/visitor.

## 2023-10-03 NOTE — DISCHARGE INSTRUCTIONS
Cataract Post Surgery Instructions     START YOUR DROPS AS SOON AS YOU GET HOME FROM SURGERY      Instill the following drops 1 drop, three (3) times daily, every four (4) hours.      FIVE MINUTES APART FROM EACH OTHER      OFLOXACIN  ( [place])    KETOROLAC  ( [place])    PREDNISOLONE ACETATE  ( [place] or [place])         RESTRICTIONS FOR SEVEN (7) DAYS FOLLOWING SURGERY     DO NOT lift anything over 10 pounds.     DO NOT bend at the waist, only at the knees.      DO NOT rub your eye.      DO NOT get any water into the eye.      DO NOT wear any makeup, lotions, or creams on/around the eye.     Wear the eye shield you were given after surgery anytime you go to sleep.  You may remove the shield while awake.           NOTE:     YOU MAY resume taking ALL your medications after surgery.     YOU MAY resume driving on your first post-operative appointment IF your vision is clear. DO NOT wear your eye shield while driving for your post operative appointments.      YOU MAY take an over-the counter pain medication such as Tylenol or Ibuprofen as needed for pain.     CALL US:  MILD DISCOMFORT IS NORMAL. HOWEVER, IF YOU EXPERIENCE SEVERE THROBBING PAIN, LOSS OF VISION, ONSET OF FLASHES AND/OR FLOATERS- CALL DR. PRADHAN OFFICE: [phone]/ AFTER HOUR [phone] OR PROCEED TO THE EMERGENCY ROOM.

## 2023-10-03 NOTE — ANESTHESIA POSTPROCEDURE EVALUATION
Anesthesia Post Evaluation    Patient: Vivienne Jose    Procedure(s) Performed: Procedure(s) (LRB):  EXTRACTION, CATARACT, WITH IOL INSERTION (Left)    Final Anesthesia Type: MAC      Patient location during evaluation: PACU  Patient participation: Yes- Able to Participate  Level of consciousness: awake  Post-procedure vital signs: reviewed and stable  Pain management: adequate  Airway patency: patent    PONV status at discharge: No PONV  Anesthetic complications: no      Cardiovascular status: blood pressure returned to baseline  Respiratory status: unassisted  Hydration status: euvolemic  Follow-up not needed.          Vitals Value Taken Time   /65 10/03/23 0817   Temp 36.5 °C (97.7 °F) 10/03/23 0752   Pulse 62 10/03/23 0831   Resp 16 10/03/23 0815   SpO2 97 % 10/03/23 0831   Vitals shown include unvalidated device data.      No case tracking events are documented in the log.      Pain/Gordo Score: Gordo Score: 10 (10/3/2023  7:53 AM)

## 2023-10-03 NOTE — TRANSFER OF CARE
"Anesthesia Transfer of Care Note    Patient: Vivienne Jose    Procedure(s) Performed: Procedure(s) (LRB):  EXTRACTION, CATARACT, WITH IOL INSERTION (Left)    Patient location: PACU    Anesthesia Type: MAC    Transport from OR: Transported from OR on room air with adequate spontaneous ventilation    Post pain: adequate analgesia    Post assessment: no apparent anesthetic complications    Post vital signs: stable    Level of consciousness: awake    Complications: none    Transfer of care protocol was followed      Last vitals:   Visit Vitals  BP (!) 179/74 (BP Location: Left arm, Patient Position: Lying)   Pulse 66   Temp 36.4 °C (97.5 °F) (Temporal)   Resp 16   Ht 5' 2" (1.575 m)   Wt 110.7 kg (244 lb)   LMP  (LMP Unknown)   SpO2 100%   Breastfeeding No   BMI 44.63 kg/m²     "

## 2023-10-03 NOTE — OP NOTE
Operative Date:  10/03/2023    Discharge Date:  10/03/2023    Discharge Patient Home    Report Title: Operative Note      SURGEON: Lyndon Ortiz MD     ASSISTANT:     PREOPERATIVE DIAGNOSIS: Visually significant NSC cataract,  Left Eye.    POSTOPERATIVE DIAGNOSIS: Visually-significant NSC cataract,  Left Eye.    PROCEDURE PERFORMED: Phacoemulsification of the cataract with posterior chamber intraocular lens Left Eye.    ANESTHESIA: Topical with MAC    COMPLICATIONS: None    ESTIMATED BLOOD LOSS: Minimal    INDICATIONS FOR PROCEDURE:   The patient is a pleasant 73 year old woman with increasing difficulties with activities of daily living secondary to a dense visually significant cataract in the Left Eye.  Discussions have been carried out with this patient concerning the options to surgery, risks, benefits and expectations.  The patient voiced good understanding and wished to proceed with the above procedure.    PROCEDURE IN DETAIL: The patient was brought to the operating room and received topical anesthetic to the eye and then was prepped and draped in the usual sterile fashion.  Using the Contreras ring and the guarded afsaneh blade set at 0.37 mm, a partial thickness clear cornea incision was made temporally.  The paracentesis site was made at the six o'clock position.  Omidria was injected into the anterior chamber through the paracentesis.  Viscoat was then injected into the anterior chamber.  The eye was then reentered at the primary surgical site with a 2.4 mm keratome followed by continuous capsulotomy, hydrodissection, hydrodelineation and phacoemulsification of the cataract.  Residual cortical material was removed using automated irrigation-aspiration technique.  Healon was injected into the posterior chamber and a CNAOTO 18.5 diopter lens was placed in the bag without difficulty. Residual viscoelastic was removed using automated irrigation-aspiration technique. The eye was re-pressurized using  Patient was scheduled for an appointment in clinic   BSS solution and both the paracentesis site and the primary surgical site were demonstrated to be watertight at the end of the case with Weck--Marlyn manipulation.  One drop of Ofloxacin and one drop of Pred acetate 1% was applied to the Left Eye .The eye was closed, patched and a Rhodes shield placed.  The patient was taken to the recovery room in good and stable condition.  The patient tolerated the procedure well.  The patient was instructed to refrain from any heavy lifting, bending, stooping or straining activities, discharged home  and to follow-up in the morning for routine postoperative care with Lyndon Ortiz MD.

## 2023-10-03 NOTE — BRIEF OP NOTE
[facility]  Brief Operative Note    Surgery Date: 10/3/2023     Surgeon(s) and Role:     * Lyndon Ortiz MD - Primary    Assisting Surgeon: None    Pre-op Diagnosis:  NS (nuclear sclerosis), left [H25.12]    Post-op Diagnosis:  Post-Op Diagnosis Codes:     * NS (nuclear sclerosis), left [H25.12]    Procedure(s) (LRB):  EXTRACTION, CATARACT, WITH IOL INSERTION (Left)    Anesthesia: Local MAC    Operative Findings:     Estimated Blood Loss: * No values recorded between 10/3/2023  7:21 AM and 10/3/2023  7:46 AM *         Specimens:   Specimen (24h ago, onward)      None              Discharge Note    OUTCOME: Patient tolerated treatment/procedure well without complication and is now ready for discharge.    DISPOSITION: Home or Self Care    FINAL DIAGNOSIS:  Nuclear sclerotic cataract of left eye    FOLLOWUP: In clinic    DISCHARGE INSTRUCTIONS:    Discharge Procedure Orders   Other restrictions (specify):   Order Comments: No heavy lifting or bending for 1 week.

## 2023-10-03 NOTE — PLAN OF CARE
Chart reviewed. Pre-op nursing care completed per orders. Safe surgery checklist complete. Family at bedside, pt belongings given to family. Waiting for update to H/P and procedural consent

## 2023-10-04 ENCOUNTER — LAB VISIT (OUTPATIENT)
Dept: LAB | Facility: HOSPITAL | Age: 73
End: 2023-10-04
Attending: INTERNAL MEDICINE
Payer: MEDICARE

## 2023-10-04 ENCOUNTER — OFFICE VISIT (OUTPATIENT)
Dept: OPHTHALMOLOGY | Facility: CLINIC | Age: 73
End: 2023-10-04
Payer: MEDICARE

## 2023-10-04 ENCOUNTER — TELEPHONE (OUTPATIENT)
Dept: NEUROSURGERY | Facility: CLINIC | Age: 73
End: 2023-10-04
Payer: MEDICARE

## 2023-10-04 DIAGNOSIS — M54.9 BACK PAIN, UNSPECIFIED BACK LOCATION, UNSPECIFIED BACK PAIN LATERALITY, UNSPECIFIED CHRONICITY: Primary | ICD-10-CM

## 2023-10-04 DIAGNOSIS — Z98.890 POST-OPERATIVE STATE: Primary | ICD-10-CM

## 2023-10-04 DIAGNOSIS — K74.69 OTHER CIRRHOSIS OF LIVER: ICD-10-CM

## 2023-10-04 LAB
ALBUMIN SERPL BCP-MCNC: 3.2 G/DL (ref 3.5–5.2)
ALP SERPL-CCNC: 81 U/L (ref 55–135)
ALT SERPL W/O P-5'-P-CCNC: 22 U/L (ref 10–44)
ANION GAP SERPL CALC-SCNC: 7 MMOL/L (ref 8–16)
AST SERPL-CCNC: 25 U/L (ref 10–40)
BILIRUB SERPL-MCNC: 0.3 MG/DL (ref 0.1–1)
BUN SERPL-MCNC: 21 MG/DL (ref 8–23)
CALCIUM SERPL-MCNC: 9.5 MG/DL (ref 8.7–10.5)
CHLORIDE SERPL-SCNC: 107 MMOL/L (ref 95–110)
CO2 SERPL-SCNC: 26 MMOL/L (ref 23–29)
CREAT SERPL-MCNC: 1 MG/DL (ref 0.5–1.4)
EST. GFR  (NO RACE VARIABLE): 59.9 ML/MIN/1.73 M^2
GLUCOSE SERPL-MCNC: 151 MG/DL (ref 70–110)
POTASSIUM SERPL-SCNC: 4.5 MMOL/L (ref 3.5–5.1)
PROT SERPL-MCNC: 7.2 G/DL (ref 6–8.4)
SODIUM SERPL-SCNC: 140 MMOL/L (ref 136–145)

## 2023-10-04 PROCEDURE — 36415 COLL VENOUS BLD VENIPUNCTURE: CPT | Performed by: INTERNAL MEDICINE

## 2023-10-04 PROCEDURE — 99999 PR PBB SHADOW E&M-EST. PATIENT-LVL II: CPT | Mod: PBBFAC,,, | Performed by: OPHTHALMOLOGY

## 2023-10-04 PROCEDURE — 1157F ADVNC CARE PLAN IN RCRD: CPT | Mod: CPTII,S$GLB,, | Performed by: OPHTHALMOLOGY

## 2023-10-04 PROCEDURE — 80053 COMPREHEN METABOLIC PANEL: CPT | Mod: HCNC | Performed by: INTERNAL MEDICINE

## 2023-10-04 PROCEDURE — 99024 POSTOP FOLLOW-UP VISIT: CPT | Mod: S$GLB,,, | Performed by: OPHTHALMOLOGY

## 2023-10-04 PROCEDURE — 4010F ACE/ARB THERAPY RXD/TAKEN: CPT | Mod: CPTII,S$GLB,, | Performed by: OPHTHALMOLOGY

## 2023-10-04 PROCEDURE — 4010F PR ACE/ARB THEARPY RXD/TAKEN: ICD-10-PCS | Mod: CPTII,S$GLB,, | Performed by: OPHTHALMOLOGY

## 2023-10-04 PROCEDURE — 99999 PR PBB SHADOW E&M-EST. PATIENT-LVL II: ICD-10-PCS | Mod: PBBFAC,,, | Performed by: OPHTHALMOLOGY

## 2023-10-04 PROCEDURE — 1160F RVW MEDS BY RX/DR IN RCRD: CPT | Mod: CPTII,S$GLB,, | Performed by: OPHTHALMOLOGY

## 2023-10-04 PROCEDURE — 3044F HG A1C LEVEL LT 7.0%: CPT | Mod: CPTII,S$GLB,, | Performed by: OPHTHALMOLOGY

## 2023-10-04 PROCEDURE — 1159F MED LIST DOCD IN RCRD: CPT | Mod: CPTII,S$GLB,, | Performed by: OPHTHALMOLOGY

## 2023-10-04 PROCEDURE — 3044F PR MOST RECENT HEMOGLOBIN A1C LEVEL <7.0%: ICD-10-PCS | Mod: CPTII,S$GLB,, | Performed by: OPHTHALMOLOGY

## 2023-10-04 PROCEDURE — 1157F PR ADVANCE CARE PLAN OR EQUIV PRESENT IN MEDICAL RECORD: ICD-10-PCS | Mod: CPTII,S$GLB,, | Performed by: OPHTHALMOLOGY

## 2023-10-04 PROCEDURE — 99024 PR POST-OP FOLLOW-UP VISIT: ICD-10-PCS | Mod: S$GLB,,, | Performed by: OPHTHALMOLOGY

## 2023-10-04 PROCEDURE — 1159F PR MEDICATION LIST DOCUMENTED IN MEDICAL RECORD: ICD-10-PCS | Mod: CPTII,S$GLB,, | Performed by: OPHTHALMOLOGY

## 2023-10-04 PROCEDURE — 1160F PR REVIEW ALL MEDS BY PRESCRIBER/CLIN PHARMACIST DOCUMENTED: ICD-10-PCS | Mod: CPTII,S$GLB,, | Performed by: OPHTHALMOLOGY

## 2023-10-04 NOTE — TELEPHONE ENCOUNTER
Returned pt's call, I stated to pt that  stated that she can have an MRI done. I scheduled pt to see Deja on Thursday 10/5 at 1130 and xray at 1030. Pt confirmed and voiced understanding

## 2023-10-04 NOTE — PROGRESS NOTES
Subjective:       Patient ID: Vivienne Jose is a 73 y.o. female.    Chief Complaint: Post-op Evaluation    HPI    S/p Phaco w/IOL OS- 10/3/2023    72 y/o female present to clinic for 1 day post-op of LT eye. Pt states s/x   went well. She has slight orbital discomfort.. Reviewed post-op   instructions in clinic today.     Eyemeds  Ofloxacin QID OS  PF QID OS  Ketorolac QID OS  Last edited by Jaqueline New on 10/4/2023  8:06 AM.             Assessment:       1. Post-operative state        Plan:       S/p CE OU- Doing well.     CPM OS.  Taper gtts OD.  RTC 1 wk.

## 2023-10-05 ENCOUNTER — OFFICE VISIT (OUTPATIENT)
Dept: NEUROSURGERY | Facility: CLINIC | Age: 73
End: 2023-10-05
Payer: MEDICARE

## 2023-10-05 ENCOUNTER — HOSPITAL ENCOUNTER (OUTPATIENT)
Dept: RADIOLOGY | Facility: HOSPITAL | Age: 73
Discharge: HOME | End: 2023-10-05
Attending: NEUROLOGICAL SURGERY
Payer: MEDICARE

## 2023-10-05 VITALS
BODY MASS INDEX: 44.91 KG/M2 | SYSTOLIC BLOOD PRESSURE: 127 MMHG | HEIGHT: 62 IN | WEIGHT: 244.06 LBS | HEART RATE: 65 BPM | DIASTOLIC BLOOD PRESSURE: 70 MMHG

## 2023-10-05 DIAGNOSIS — G89.29 CHRONIC BILATERAL LOW BACK PAIN WITHOUT SCIATICA: ICD-10-CM

## 2023-10-05 DIAGNOSIS — M48.061 SPINAL STENOSIS, LUMBAR REGION WITHOUT NEUROGENIC CLAUDICATION: ICD-10-CM

## 2023-10-05 DIAGNOSIS — M47.816 SPONDYLOSIS OF LUMBAR REGION WITHOUT MYELOPATHY OR RADICULOPATHY: Primary | ICD-10-CM

## 2023-10-05 DIAGNOSIS — M43.16 SPONDYLOLISTHESIS, LUMBAR REGION: ICD-10-CM

## 2023-10-05 DIAGNOSIS — M54.50 CHRONIC BILATERAL LOW BACK PAIN WITHOUT SCIATICA: ICD-10-CM

## 2023-10-05 DIAGNOSIS — M54.9 BACK PAIN, UNSPECIFIED BACK LOCATION, UNSPECIFIED BACK PAIN LATERALITY, UNSPECIFIED CHRONICITY: ICD-10-CM

## 2023-10-05 PROCEDURE — 3288F FALL RISK ASSESSMENT DOCD: CPT | Mod: HCNC,CPTII,S$GLB, | Performed by: PHYSICIAN ASSISTANT

## 2023-10-05 PROCEDURE — 3044F HG A1C LEVEL LT 7.0%: CPT | Mod: HCNC,CPTII,S$GLB, | Performed by: PHYSICIAN ASSISTANT

## 2023-10-05 PROCEDURE — 3074F SYST BP LT 130 MM HG: CPT | Mod: HCNC,CPTII,S$GLB, | Performed by: PHYSICIAN ASSISTANT

## 2023-10-05 PROCEDURE — 4010F ACE/ARB THERAPY RXD/TAKEN: CPT | Mod: HCNC,CPTII,S$GLB, | Performed by: PHYSICIAN ASSISTANT

## 2023-10-05 PROCEDURE — 1125F PR PAIN SEVERITY QUANTIFIED, PAIN PRESENT: ICD-10-PCS | Mod: HCNC,CPTII,S$GLB, | Performed by: PHYSICIAN ASSISTANT

## 2023-10-05 PROCEDURE — 1157F ADVNC CARE PLAN IN RCRD: CPT | Mod: HCNC,CPTII,S$GLB, | Performed by: PHYSICIAN ASSISTANT

## 2023-10-05 PROCEDURE — 1157F PR ADVANCE CARE PLAN OR EQUIV PRESENT IN MEDICAL RECORD: ICD-10-PCS | Mod: HCNC,CPTII,S$GLB, | Performed by: PHYSICIAN ASSISTANT

## 2023-10-05 PROCEDURE — 72110 X-RAY EXAM L-2 SPINE 4/>VWS: CPT | Mod: TC,HCNC,FY

## 2023-10-05 PROCEDURE — 99999 PR PBB SHADOW E&M-EST. PATIENT-LVL V: CPT | Mod: PBBFAC,HCNC,, | Performed by: PHYSICIAN ASSISTANT

## 2023-10-05 PROCEDURE — 99999 PR PBB SHADOW E&M-EST. PATIENT-LVL V: ICD-10-PCS | Mod: PBBFAC,HCNC,, | Performed by: PHYSICIAN ASSISTANT

## 2023-10-05 PROCEDURE — 72110 XR LUMBAR SPINE AP AND LAT WITH FLEX/EXT: ICD-10-PCS | Mod: 26,HCNC,, | Performed by: RADIOLOGY

## 2023-10-05 PROCEDURE — 99214 PR OFFICE/OUTPT VISIT, EST, LEVL IV, 30-39 MIN: ICD-10-PCS | Mod: HCNC,S$GLB,, | Performed by: PHYSICIAN ASSISTANT

## 2023-10-05 PROCEDURE — 72110 X-RAY EXAM L-2 SPINE 4/>VWS: CPT | Mod: 26,HCNC,, | Performed by: RADIOLOGY

## 2023-10-05 PROCEDURE — 3044F PR MOST RECENT HEMOGLOBIN A1C LEVEL <7.0%: ICD-10-PCS | Mod: HCNC,CPTII,S$GLB, | Performed by: PHYSICIAN ASSISTANT

## 2023-10-05 PROCEDURE — 1101F PT FALLS ASSESS-DOCD LE1/YR: CPT | Mod: HCNC,CPTII,S$GLB, | Performed by: PHYSICIAN ASSISTANT

## 2023-10-05 PROCEDURE — 3078F PR MOST RECENT DIASTOLIC BLOOD PRESSURE < 80 MM HG: ICD-10-PCS | Mod: HCNC,CPTII,S$GLB, | Performed by: PHYSICIAN ASSISTANT

## 2023-10-05 PROCEDURE — 1159F MED LIST DOCD IN RCRD: CPT | Mod: HCNC,CPTII,S$GLB, | Performed by: PHYSICIAN ASSISTANT

## 2023-10-05 PROCEDURE — 4010F PR ACE/ARB THEARPY RXD/TAKEN: ICD-10-PCS | Mod: HCNC,CPTII,S$GLB, | Performed by: PHYSICIAN ASSISTANT

## 2023-10-05 PROCEDURE — 3008F BODY MASS INDEX DOCD: CPT | Mod: HCNC,CPTII,S$GLB, | Performed by: PHYSICIAN ASSISTANT

## 2023-10-05 PROCEDURE — 1160F PR REVIEW ALL MEDS BY PRESCRIBER/CLIN PHARMACIST DOCUMENTED: ICD-10-PCS | Mod: HCNC,CPTII,S$GLB, | Performed by: PHYSICIAN ASSISTANT

## 2023-10-05 PROCEDURE — 1125F AMNT PAIN NOTED PAIN PRSNT: CPT | Mod: HCNC,CPTII,S$GLB, | Performed by: PHYSICIAN ASSISTANT

## 2023-10-05 PROCEDURE — 3008F PR BODY MASS INDEX (BMI) DOCUMENTED: ICD-10-PCS | Mod: HCNC,CPTII,S$GLB, | Performed by: PHYSICIAN ASSISTANT

## 2023-10-05 PROCEDURE — 3078F DIAST BP <80 MM HG: CPT | Mod: HCNC,CPTII,S$GLB, | Performed by: PHYSICIAN ASSISTANT

## 2023-10-05 PROCEDURE — 3288F PR FALLS RISK ASSESSMENT DOCUMENTED: ICD-10-PCS | Mod: HCNC,CPTII,S$GLB, | Performed by: PHYSICIAN ASSISTANT

## 2023-10-05 PROCEDURE — 99214 OFFICE O/P EST MOD 30 MIN: CPT | Mod: HCNC,S$GLB,, | Performed by: PHYSICIAN ASSISTANT

## 2023-10-05 PROCEDURE — 3074F PR MOST RECENT SYSTOLIC BLOOD PRESSURE < 130 MM HG: ICD-10-PCS | Mod: HCNC,CPTII,S$GLB, | Performed by: PHYSICIAN ASSISTANT

## 2023-10-05 PROCEDURE — 1160F RVW MEDS BY RX/DR IN RCRD: CPT | Mod: HCNC,CPTII,S$GLB, | Performed by: PHYSICIAN ASSISTANT

## 2023-10-05 PROCEDURE — 1101F PR PT FALLS ASSESS DOC 0-1 FALLS W/OUT INJ PAST YR: ICD-10-PCS | Mod: HCNC,CPTII,S$GLB, | Performed by: PHYSICIAN ASSISTANT

## 2023-10-05 PROCEDURE — 1159F PR MEDICATION LIST DOCUMENTED IN MEDICAL RECORD: ICD-10-PCS | Mod: HCNC,CPTII,S$GLB, | Performed by: PHYSICIAN ASSISTANT

## 2023-10-05 NOTE — PROGRESS NOTES
Subjective:     Patient ID:  Vivienne Jose is a 73 y.o. female.    Emanuel    Chief Complaint:  Back pain    HPI   10/05/2023    Vivienne Jose is a 73 y.o. female who presents with the above CC.  Patient has had back pain for many years.  Pain has been getting progressively worsening pain across the right hip and buttock region and across the low back.  She describes the pain as burning.  It is worse with walking and standing and better with sitting lying down.  She does get some pins and needles sensation in her left leg.  She had right lateral leg pain to the calf in the past but this has gone away.      She had physical therapy in 2018 through [facility] that helped.  It really helped her knee pain.  No epidural steroid injections and no spine surgery.  She takes gabapentin 600 mg twice a day.    Patient denies any recent accidents or trauma, no saddle anesthesias, and no bowel or bladder incontinence.      Review of Systems:    Review of Systems   Constitutional:  Negative for chills, diaphoresis, fever, malaise/fatigue and weight loss.   HENT:  Negative for congestion, ear discharge, ear pain, hearing loss, nosebleeds, sinus pain, sore throat and tinnitus.    Eyes:  Negative for blurred vision, double vision, photophobia, pain, discharge and redness.   Respiratory:  Negative for cough, hemoptysis, sputum production, shortness of breath, wheezing and stridor.    Cardiovascular:  Negative for chest pain, palpitations, orthopnea, leg swelling and PND.   Gastrointestinal:  Negative for abdominal pain, blood in stool, constipation, diarrhea, heartburn, melena, nausea and vomiting.   Genitourinary:  Negative for dysuria, flank pain, frequency, hematuria and urgency.   Musculoskeletal:  Positive for back pain. Negative for falls, joint pain, myalgias and neck pain.   Skin:  Negative for itching and rash.   Neurological:  Positive for dizziness. Negative for tingling, tremors, sensory change, speech change,  seizures, loss of consciousness, weakness and headaches.   Endo/Heme/Allergies:  Negative for environmental allergies and polydipsia. Bruises/bleeds easily.   Psychiatric/Behavioral:  Negative for depression, hallucinations, memory loss and substance abuse. The patient is not nervous/anxious and does not have insomnia.          Past Medical History:   Diagnosis Date    Allergy     Angio-edema     Arthritis     Cataract     bilateral - not removed    Cerebrovascular malformation     Cirrhosis 11/24/2020    Colon polyps     Diabetes mellitus, type 2     Diabetic peripheral neuropathy     Edema of both feet 8/19/2022    GERD (gastroesophageal reflux disease)     Herpes infection     Hypothyroidism     Kidney stones     Mild nonproliferative diabetic retinopathy of 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 edema associated with type 2 diabetes mellitus 4/18/2019    DEL RIO (nonalcoholic steatohepatitis) 8/19/2022    Nausea & vomiting 11/23/2015    Obesity, morbid     Ovarian cyst     Seizure disorder, focal motor     Sleep apnea     Type II or unspecified type diabetes mellitus with neurological manifestations, uncontrolled(250.62)     Urticaria      Past Surgical History:   Procedure Laterality Date    CARPAL TUNNEL RELEASE Right 2014    CATARACT EXTRACTION W/  INTRAOCULAR LENS IMPLANT Right 9/19/2023    Procedure: EXTRACTION, CATARACT, WITH IOL INSERTION;  Surgeon: Lyndon Ortiz MD;  [facility];  Service: Ophthalmology;  Laterality: Right;    CATARACT EXTRACTION W/  INTRAOCULAR LENS IMPLANT Left 10/3/2023    Procedure: EXTRACTION, CATARACT, WITH IOL INSERTION;  Surgeon: Lyndon Ortiz MD;  [facility];  Service: Ophthalmology;  Laterality: Left;    COLONOSCOPY      COLONOSCOPY N/A 9/13/2017    Procedure: COLONOSCOPY Golytely;  Surgeon: Gisela Wall MD;  [facility];  Service: Endoscopy;  Laterality: N/A;    COLONOSCOPY N/A 9/9/2022    Procedure: COLONOSCOPY Extended Suprep;  Surgeon:  Britt Jasso MD;  Location: [facility] ENDO;  Service: Endoscopy;  Laterality: N/A;    CYST REMOVAL      skin; multiples    ESOPHAGOGASTRODUODENOSCOPY Left 10/15/2021    Procedure: EGD (ESOPHAGOGASTRODUODENOSCOPY);  Surgeon: Luis Fernando Taveras MD;  Location: [facility] ENDO (4TH FLR);  Service: Endoscopy;  Laterality: Left;  cirrhosis labwork am of procedure  last seizure 1973  COVID test on 10/12/21 at [facility]    ESOPHAGOGASTRODUODENOSCOPY N/A 3/10/2023    Procedure: EGD (ESOPHAGOGASTRODUODENOSCOPY);  Surgeon: Christa Caldera MD;  Location: [facility] (2ND FLR);  Service: Endoscopy;  Laterality: N/A;  Medically Urgent  cirrohsis-labs done on 2/9/23 (< 90 days)  New onset A-fib  3/1 instructions to portal-st  Precall complete- [place]    EXTRACORPOREAL SHOCK WAVE LITHOTRIPSY  2002    HYSTERECTOMY  1984    JOINT REPLACEMENT Right 03/06/2019    knee    KIDNEY STONE SURGERY      KNEE ARTHROPLASTY Right 3/6/2019    Procedure: ARTHROPLASTY, KNEE;  Surgeon: Pj Gamboa MD;  Location: [facility] OR;  Service: Orthopedics;  Laterality: Right;  Depuy (Stephen notified 2/21, CC)    THYROIDECTOMY  1977         TONSILLECTOMY, ADENOIDECTOMY      TRIGGER FINGER RELEASE      TUBAL LIGATION       Current Outpatient Medications on File Prior to Visit   Medication Sig Dispense Refill    ascorbic acid, vitamin C, (VITAMIN C) 100 MG tablet Take 500 mg by mouth 2 (two) times daily.       aspirin (ECOTRIN) 81 MG EC tablet Take 81 mg by mouth once daily.      blood sugar diagnostic (TRUE METRIX GLUCOSE TEST STRIP) Strp TEST TWO TIMES DAILY 200 strip 6    blood-glucose meter Misc [employer] True Metrix Air meter 1 each 0    calcium carbonate (CALCIUM 500 ORAL) Take 1,000 mg by mouth.      calcium carbonate-vitamin D3 600 mg(1,500mg) -100 unit Cap Take 1 tablet by mouth once daily.      estradioL (ESTRING) 2 mg (7.5 mcg /24 hour) vaginal ring Remove old Estring, place the new one every 3 months. 1 each 3    fluticasone propionate (FLONASE) 50 mcg/actuation  "nasal spray 2 sprays (100 mcg total) by Each Nostril route once daily. 48 g 4    furosemide (LASIX) 20 MG tablet Take 1 tablet (20 mg total) by mouth once daily. 30 tablet 11    gabapentin (NEURONTIN) 600 MG tablet Take 1 tablet (600 mg total) by mouth 2 (two) times daily. 180 tablet 3    glipiZIDE (GLUCOTROL) 5 MG tablet TAKE 1 TABLET TWICE DAILY BEFORE A MEAL 180 tablet 0    ipratropium (ATROVENT) 42 mcg (0.06 %) nasal spray 2 sprays by Each Nostril route 4 (four) times daily. 45 mL 4    ketorolac 0.5% (ACULAR) 0.5 % Drop Place 1 drop into the right eye 4 (four) times daily. 5 mL 1    ketotifen (ZADITOR) 0.025 % (0.035 %) ophthalmic solution Place 1-2 drops into both eyes once daily.      L.acid-B.bifidum-B.animal-FOS 25 billion cell -100 mg Cap Take 1 capsule by mouth once daily.      lancets (TRUEPLUS LANCETS) 28 gauge Misc Inject 1 lancet into the skin 2 (two) times daily before meals. 200 each 6    levothyroxine (SYNTHROID) 75 MCG tablet TAKE 1 TABLET EVERY DAY 90 tablet 3    loratadine (CLARITIN) 10 mg tablet Take 10 mg by mouth once daily.      lovastatin (MEVACOR) 40 MG tablet TAKE 1 TABLET NIGHTLY (SUBSTITUTED FOR MEVACOR) 90 tablet 1    metFORMIN (GLUCOPHAGE) 1000 MG tablet TAKE 1 TABLET TWICE DAILY WITH MEALS 180 tablet 1    mv-mn/folic acid/vit K/rdor754 (ALIVE ONCE DAILY WOMEN 50 PLUS ORAL) Take 1 tablet by mouth once daily.      omeprazole (PRILOSEC) 20 MG capsule Take 1 capsule (20 mg total) by mouth daily as needed (acid reflux). 90 capsule 3    OZEMPIC 0.25 mg or 0.5 mg (2 mg/3 mL) pen injector INJECT 0.5MG UNDER THE SKIN ONE TIME WEEKLY (Patient taking differently: Tues) 9 each 0    pen needle, diabetic (BD ULTRA-FINE EDUARDO PEN NEEDLE) 32 gauge x 5/32" Ndle USE AS DIRECTED 200 each 6    prednisoLONE acetate (PRED FORTE) 1 % DrpS Place 1 drop into the right eye 4 (four) times daily. 5 mL 1    semaglutide (OZEMPIC) 0.25 mg or 0.5 mg(2 mg/1.5 mL) pen injector Inject 0.5 mg into the skin every 7 days. " 3 pen 4    spironolactone (ALDACTONE) 50 MG tablet Take 1 tablet (50 mg total) by mouth once daily. 30 tablet 11    traMADoL (ULTRAM) 50 mg tablet Take 1 tablet (50 mg total) by mouth every 8 (eight) hours as needed for Pain. 21 tablet 0    TRESIBA FLEXTOUCH U-100 100 unit/mL (3 mL) insulin pen INJECT 38 UNITS INTO THE SKIN EVERY EVENING. (Patient taking differently: INJECT 25 UNITS INTO THE SKIN EVERY EVENING.) 45 mL 2    UNABLE TO FIND Take 1 tablet by mouth 2 (two) times a day. medication name: Magnesium 400mg, Calcium 1000 mg, D3 15mcg, Zinc 15mg      valACYclovir (VALTREX) 500 MG tablet TAKE 1 TABLET EVERY DAY 90 tablet 3    valsartan (DIOVAN) 160 MG tablet TAKE 1 TABLET TWICE DAILY 180 tablet 2    azelastine (ASTELIN) 137 mcg (0.1 %) nasal spray 2 sprays (274 mcg total) by Nasal route 2 (two) times daily. 120 mL 4     No current facility-administered medications on file prior to visit.     Review of patient's allergies indicates:   Allergen Reactions    Sulfa (sulfonamide antibiotics) Nausea Only    Ciprofloxacin     Januvia [sitagliptin]      abd pain    Lisinopril     Lotensin [benazepril]     Nexium [esomeprazole magnesium] Other (See Comments)     Gas     Social History     Socioeconomic History    Marital status:    Occupational History    Occupation: retired     Employer: [employer]   Tobacco Use    Smoking status: Never    Smokeless tobacco: Never   Substance and Sexual Activity    Alcohol use: No     Alcohol/week: 0.0 standard drinks of alcohol    Drug use: No    Sexual activity: Yes     Partners: Male     Social Determinants of Health     Financial Resource Strain: Low Risk  (10/4/2023)    Overall Financial Resource Strain (CARDIA)     Difficulty of Paying Living Expenses: Not hard at all   Food Insecurity: No Food Insecurity (10/4/2023)    Hunger Vital Sign     Worried About Running Out of Food in the Last Year: Never true     Ran Out of Food in the Last Year: Never true  "  Transportation Needs: No Transportation Needs (10/4/2023)    PRAPARE - Transportation     Lack of Transportation (Medical): No     Lack of Transportation (Non-Medical): No   Physical Activity: Inactive (10/4/2023)    Exercise Vital Sign     Days of Exercise per Week: 0 days     Minutes of Exercise per Session: 0 min   Stress: Stress Concern Present (10/4/2023)    [employer] - Occupational Stress Questionnaire     Feeling of Stress : To some extent   Social Connections: Unknown (10/4/2023)    Social Connection and Isolation Panel [NHANES]     Frequency of Communication with Friends and Family: Never     Frequency of Social Gatherings with Friends and Family: Patient refused     Active Member of Clubs or Organizations: No     Attends Club or Organization Meetings: Never     Marital Status:    Housing Stability: Low Risk  (10/4/2023)    Housing Stability Vital Sign     Unable to Pay for Housing in the Last Year: No     Number of Places Lived in the Last Year: 1     Unstable Housing in the Last Year: No     Family History   Problem Relation Age of Onset    Skin cancer Mother     Macular degeneration Mother     Dementia Mother     Hypertension Mother     Cancer Father     Arthritis Father     Gout Father     No Known Problems Daughter     Diabetes Daughter     Hypertension Daughter     Arthritis Daughter     Allergies Son     Allergic rhinitis Son     Glaucoma Maternal Aunt         Great Maternal Aunt    Leukemia Maternal Uncle     Amblyopia Neg Hx     Cataracts Neg Hx     Retinal detachment Neg Hx     Strabismus Neg Hx     Stroke Neg Hx     Thyroid disease Neg Hx     Kidney disease Neg Hx     Angioedema Neg Hx     Asthma Neg Hx     Atopy Neg Hx     Eczema Neg Hx     Immunodeficiency Neg Hx     Rhinitis Neg Hx     Urticaria Neg Hx        Objective:      Vitals:    10/05/23 1021   BP: 127/70   Pulse: 65   Weight: 110.7 kg (244 lb 0.8 oz)   Height: 5' 2" (1.575 m)   PainSc:   3 "   PainLoc: Back         Physical Exam:      General:  Vivienne Jose is well-developed, well-nourished, appears stated age, in no acute distress, alert and oriented to person, place, and time.    Pulmonary/Chest:  Respiratory effort normal  Abdominal: Exhibits no distension  Psychiatric:  Normal mood and affect.  Behavior is normal.  Judgement and thought content normal      Musculoskeletal:    Lumbar ROM:   No pain in lumbar flexion.  Pain in extension, left lateral bending, and right lateral bending.      Lumbar Spine Inspection:  Normal with no surgical scars and no visible rashes.    Lumbar Spine Palpation:  No tenderness to low back palpation.    SI Joint Palpation:  No tenderness to SI Joint palpation.    Straight Leg Raise:  Negative right and left SLR.      Neurological:     Muscle strength against resistance:     Right Left   Hip flexion  5 / 5 5 / 5   Hip extension 5 / 5 5 / 5   Hip abduction 5 / 5 5 / 5   Hip adduction  5 / 5 5 / 5   Knee extension  5 / 5 5 / 5   Knee flexion 5 / 5 5 / 5   Dorsiflexion  5 / 5 5 / 5   EHL  5 / 5 5 / 5   Plantar flexion  5 / 5 5 / 5   Inversion of the feet 5 / 5 5 / 5   Eversion of the feet  5 / 5 5 / 5       Reflexes:     Right Left   Patellar 2+ 2+   Achilles 2+ 2+     Clonus:  Negative bilaterally    On gross examination of the bilateral upper extremities, patient has full painfree ROM with no signs of clubbing, cyanosis, edema, or weakness.       XRAY/CT Interpretation:     Lumbar spine xrays were personally reviewed today.  No fractures.  No movement on flexion and extension.  Grade one spondylolisthesis at L4-5.    Lumbar spine CT was personally reviewed today.  Grade one spondylolisthesis at L4-5 with spinal stenosis.        Assessment:          1. Spondylosis of lumbar region without myelopathy or radiculopathy    2. Chronic bilateral low back pain without sciatica    3. Spinal stenosis, lumbar region without neurogenic claudication    4. Spondylolisthesis, lumbar  region            Plan:          Orders Placed This Encounter    Ambulatory referral/consult to [facility]       L4-5 grade one spondylolisthesis with spinal stenosis    [facility]  -FU in three months and if no improvement will refer to pain management to discuss interventional pain procedures    Follow-Up:  Follow up in about 3 months (around 1/5/2024). If there are any questions prior to this, the patient was instructed to contact the office.       BETSY Gallardo, PA-C  Neurosurgery  [facility]  10/05/2023

## 2023-10-10 ENCOUNTER — CLINICAL SUPPORT (OUTPATIENT)
Dept: ALLERGY | Facility: CLINIC | Age: 73
End: 2023-10-10
Payer: MEDICARE

## 2023-10-10 DIAGNOSIS — J30.9 CHRONIC ALLERGIC RHINITIS: Primary | ICD-10-CM

## 2023-10-10 PROCEDURE — 99999 PR PBB SHADOW E&M-EST. PATIENT-LVL I: CPT | Mod: PBBFAC,HCNC,,

## 2023-10-10 PROCEDURE — 95115 PR IMMUNOTHERAPY, ONE INJECTION: ICD-10-PCS | Mod: HCNC,S$GLB,, | Performed by: ALLERGY & IMMUNOLOGY

## 2023-10-10 PROCEDURE — 95115 IMMUNOTHERAPY ONE INJECTION: CPT | Mod: HCNC,S$GLB,, | Performed by: ALLERGY & IMMUNOLOGY

## 2023-10-10 PROCEDURE — 99999 PR PBB SHADOW E&M-EST. PATIENT-LVL I: ICD-10-PCS | Mod: PBBFAC,HCNC,,

## 2023-10-11 ENCOUNTER — OFFICE VISIT (OUTPATIENT)
Dept: OPHTHALMOLOGY | Facility: CLINIC | Age: 73
End: 2023-10-11
Payer: MEDICARE

## 2023-10-11 DIAGNOSIS — Z98.890 POST-OPERATIVE STATE: Primary | ICD-10-CM

## 2023-10-11 DIAGNOSIS — H25.13 NUCLEAR SCLEROTIC CATARACT OF BOTH EYES: ICD-10-CM

## 2023-10-11 PROCEDURE — 4010F ACE/ARB THERAPY RXD/TAKEN: CPT | Mod: HCNC,CPTII,S$GLB, | Performed by: OPHTHALMOLOGY

## 2023-10-11 PROCEDURE — 99024 PR POST-OP FOLLOW-UP VISIT: ICD-10-PCS | Mod: HCNC,S$GLB,, | Performed by: OPHTHALMOLOGY

## 2023-10-11 PROCEDURE — 99999 PR PBB SHADOW E&M-EST. PATIENT-LVL IV: CPT | Mod: PBBFAC,HCNC,, | Performed by: OPHTHALMOLOGY

## 2023-10-11 PROCEDURE — 1160F PR REVIEW ALL MEDS BY PRESCRIBER/CLIN PHARMACIST DOCUMENTED: ICD-10-PCS | Mod: HCNC,CPTII,S$GLB, | Performed by: OPHTHALMOLOGY

## 2023-10-11 PROCEDURE — 3288F PR FALLS RISK ASSESSMENT DOCUMENTED: ICD-10-PCS | Mod: HCNC,CPTII,S$GLB, | Performed by: OPHTHALMOLOGY

## 2023-10-11 PROCEDURE — 1160F RVW MEDS BY RX/DR IN RCRD: CPT | Mod: HCNC,CPTII,S$GLB, | Performed by: OPHTHALMOLOGY

## 2023-10-11 PROCEDURE — 1126F AMNT PAIN NOTED NONE PRSNT: CPT | Mod: HCNC,CPTII,S$GLB, | Performed by: OPHTHALMOLOGY

## 2023-10-11 PROCEDURE — 4010F PR ACE/ARB THEARPY RXD/TAKEN: ICD-10-PCS | Mod: HCNC,CPTII,S$GLB, | Performed by: OPHTHALMOLOGY

## 2023-10-11 PROCEDURE — 1101F PR PT FALLS ASSESS DOC 0-1 FALLS W/OUT INJ PAST YR: ICD-10-PCS | Mod: HCNC,CPTII,S$GLB, | Performed by: OPHTHALMOLOGY

## 2023-10-11 PROCEDURE — 99999 PR PBB SHADOW E&M-EST. PATIENT-LVL IV: ICD-10-PCS | Mod: PBBFAC,HCNC,, | Performed by: OPHTHALMOLOGY

## 2023-10-11 PROCEDURE — 1159F MED LIST DOCD IN RCRD: CPT | Mod: HCNC,CPTII,S$GLB, | Performed by: OPHTHALMOLOGY

## 2023-10-11 PROCEDURE — 3044F PR MOST RECENT HEMOGLOBIN A1C LEVEL <7.0%: ICD-10-PCS | Mod: HCNC,CPTII,S$GLB, | Performed by: OPHTHALMOLOGY

## 2023-10-11 PROCEDURE — 1101F PT FALLS ASSESS-DOCD LE1/YR: CPT | Mod: HCNC,CPTII,S$GLB, | Performed by: OPHTHALMOLOGY

## 2023-10-11 PROCEDURE — 1159F PR MEDICATION LIST DOCUMENTED IN MEDICAL RECORD: ICD-10-PCS | Mod: HCNC,CPTII,S$GLB, | Performed by: OPHTHALMOLOGY

## 2023-10-11 PROCEDURE — 1157F PR ADVANCE CARE PLAN OR EQUIV PRESENT IN MEDICAL RECORD: ICD-10-PCS | Mod: HCNC,CPTII,S$GLB, | Performed by: OPHTHALMOLOGY

## 2023-10-11 PROCEDURE — 1126F PR PAIN SEVERITY QUANTIFIED, NO PAIN PRESENT: ICD-10-PCS | Mod: HCNC,CPTII,S$GLB, | Performed by: OPHTHALMOLOGY

## 2023-10-11 PROCEDURE — 1157F ADVNC CARE PLAN IN RCRD: CPT | Mod: HCNC,CPTII,S$GLB, | Performed by: OPHTHALMOLOGY

## 2023-10-11 PROCEDURE — 3044F HG A1C LEVEL LT 7.0%: CPT | Mod: HCNC,CPTII,S$GLB, | Performed by: OPHTHALMOLOGY

## 2023-10-11 PROCEDURE — 99024 POSTOP FOLLOW-UP VISIT: CPT | Mod: HCNC,S$GLB,, | Performed by: OPHTHALMOLOGY

## 2023-10-11 PROCEDURE — 3288F FALL RISK ASSESSMENT DOCD: CPT | Mod: HCNC,CPTII,S$GLB, | Performed by: OPHTHALMOLOGY

## 2023-10-11 RX ORDER — KETOROLAC TROMETHAMINE 5 MG/ML
1 SOLUTION OPHTHALMIC 4 TIMES DAILY
Qty: 5 ML | Refills: 1 | Status: SHIPPED | OUTPATIENT
Start: 2023-10-11 | End: 2023-11-10

## 2023-10-12 ENCOUNTER — CLINICAL SUPPORT (OUTPATIENT)
Dept: FAMILY MEDICINE | Facility: CLINIC | Age: 73
End: 2023-10-12
Payer: MEDICARE

## 2023-10-12 VITALS — SYSTOLIC BLOOD PRESSURE: 142 MMHG | DIASTOLIC BLOOD PRESSURE: 78 MMHG

## 2023-10-12 DIAGNOSIS — Z23 FLU VACCINE NEED: Primary | ICD-10-CM

## 2023-10-12 PROCEDURE — G0008 ADMIN INFLUENZA VIRUS VAC: HCPCS | Mod: HCNC,S$GLB,, | Performed by: INTERNAL MEDICINE

## 2023-10-12 PROCEDURE — 90694 VACC AIIV4 NO PRSRV 0.5ML IM: CPT | Mod: HCNC,S$GLB,, | Performed by: INTERNAL MEDICINE

## 2023-10-12 PROCEDURE — 90694 FLU VACCINE - QUADRIVALENT - ADJUVANTED: ICD-10-PCS | Mod: HCNC,S$GLB,, | Performed by: INTERNAL MEDICINE

## 2023-10-12 PROCEDURE — G0008 FLU VACCINE - QUADRIVALENT - ADJUVANTED: ICD-10-PCS | Mod: HCNC,S$GLB,, | Performed by: INTERNAL MEDICINE

## 2023-10-12 NOTE — PATIENT INSTRUCTIONS
Patient came in foe a BP check as well as a flu vaccine. Flu vaccine was given in the right arm. Patient tolerated it well.

## 2023-10-12 NOTE — PROGRESS NOTES
Subjective:       Patient ID: Vivienne Jose is a 73 y.o. female.    Chief Complaint: Post-op Evaluation (Vivienne Jose is a 72 y/o female )    HPI     Post-op Evaluation     Additional comments: Vivienne Jose is a 72 y/o female            Comments    Pt here for 1 week Post Op OS  Pt state no complaints at at this time   Denies f/f    S/p Phaco w/IOL OS- 10/3/2023   S/p phaco w/IOL OD- 9/19/2023     Gtts:  Pred TID OS  Keto TID OS             Last edited by Hi Webster on 10/11/2023  1:45 PM.             Assessment:       1. Post-operative state    2. Nuclear sclerotic cataract of both eyes        Plan:       S/p CE OU- Doing well.       Taper gtts OU.  RTC 3 wks.

## 2023-10-27 ENCOUNTER — HOSPITAL ENCOUNTER (OUTPATIENT)
Dept: RADIOLOGY | Facility: HOSPITAL | Age: 73
Discharge: HOME | End: 2023-10-27
Attending: INTERNAL MEDICINE
Payer: MEDICARE

## 2023-10-27 DIAGNOSIS — Z12.31 SCREENING MAMMOGRAM FOR BREAST CANCER: ICD-10-CM

## 2023-10-27 PROCEDURE — 77067 SCR MAMMO BI INCL CAD: CPT | Mod: TC,HCNC

## 2023-10-27 PROCEDURE — 77063 BREAST TOMOSYNTHESIS BI: CPT | Mod: 26,HCNC,, | Performed by: RADIOLOGY

## 2023-10-27 PROCEDURE — 77063 MAMMO DIGITAL SCREENING BILAT WITH TOMO: ICD-10-PCS | Mod: 26,HCNC,, | Performed by: RADIOLOGY

## 2023-10-27 PROCEDURE — 77067 MAMMO DIGITAL SCREENING BILAT WITH TOMO: ICD-10-PCS | Mod: 26,HCNC,, | Performed by: RADIOLOGY

## 2023-10-27 PROCEDURE — 77067 SCR MAMMO BI INCL CAD: CPT | Mod: 26,HCNC,, | Performed by: RADIOLOGY

## 2023-11-01 ENCOUNTER — OFFICE VISIT (OUTPATIENT)
Dept: OPHTHALMOLOGY | Facility: CLINIC | Age: 73
End: 2023-11-01
Payer: MEDICARE

## 2023-11-01 DIAGNOSIS — H52.7 REFRACTIVE ERROR: ICD-10-CM

## 2023-11-01 DIAGNOSIS — Z98.890 POST-OPERATIVE STATE: Primary | ICD-10-CM

## 2023-11-01 PROCEDURE — 3288F PR FALLS RISK ASSESSMENT DOCUMENTED: ICD-10-PCS | Mod: CPTII,S$GLB,, | Performed by: OPHTHALMOLOGY

## 2023-11-01 PROCEDURE — 1101F PR PT FALLS ASSESS DOC 0-1 FALLS W/OUT INJ PAST YR: ICD-10-PCS | Mod: CPTII,S$GLB,, | Performed by: OPHTHALMOLOGY

## 2023-11-01 PROCEDURE — 1157F ADVNC CARE PLAN IN RCRD: CPT | Mod: CPTII,S$GLB,, | Performed by: OPHTHALMOLOGY

## 2023-11-01 PROCEDURE — 1160F PR REVIEW ALL MEDS BY PRESCRIBER/CLIN PHARMACIST DOCUMENTED: ICD-10-PCS | Mod: CPTII,S$GLB,, | Performed by: OPHTHALMOLOGY

## 2023-11-01 PROCEDURE — 3288F FALL RISK ASSESSMENT DOCD: CPT | Mod: CPTII,S$GLB,, | Performed by: OPHTHALMOLOGY

## 2023-11-01 PROCEDURE — 3044F PR MOST RECENT HEMOGLOBIN A1C LEVEL <7.0%: ICD-10-PCS | Mod: CPTII,S$GLB,, | Performed by: OPHTHALMOLOGY

## 2023-11-01 PROCEDURE — 99024 PR POST-OP FOLLOW-UP VISIT: ICD-10-PCS | Mod: S$GLB,,, | Performed by: OPHTHALMOLOGY

## 2023-11-01 PROCEDURE — 4010F PR ACE/ARB THEARPY RXD/TAKEN: ICD-10-PCS | Mod: CPTII,S$GLB,, | Performed by: OPHTHALMOLOGY

## 2023-11-01 PROCEDURE — 99999 PR PBB SHADOW E&M-EST. PATIENT-LVL IV: ICD-10-PCS | Mod: PBBFAC,,, | Performed by: OPHTHALMOLOGY

## 2023-11-01 PROCEDURE — 99999 PR PBB SHADOW E&M-EST. PATIENT-LVL IV: CPT | Mod: PBBFAC,,, | Performed by: OPHTHALMOLOGY

## 2023-11-01 PROCEDURE — 1157F PR ADVANCE CARE PLAN OR EQUIV PRESENT IN MEDICAL RECORD: ICD-10-PCS | Mod: CPTII,S$GLB,, | Performed by: OPHTHALMOLOGY

## 2023-11-01 PROCEDURE — 3044F HG A1C LEVEL LT 7.0%: CPT | Mod: CPTII,S$GLB,, | Performed by: OPHTHALMOLOGY

## 2023-11-01 PROCEDURE — 1126F PR PAIN SEVERITY QUANTIFIED, NO PAIN PRESENT: ICD-10-PCS | Mod: CPTII,S$GLB,, | Performed by: OPHTHALMOLOGY

## 2023-11-01 PROCEDURE — 99024 POSTOP FOLLOW-UP VISIT: CPT | Mod: S$GLB,,, | Performed by: OPHTHALMOLOGY

## 2023-11-01 PROCEDURE — 1101F PT FALLS ASSESS-DOCD LE1/YR: CPT | Mod: CPTII,S$GLB,, | Performed by: OPHTHALMOLOGY

## 2023-11-01 PROCEDURE — 4010F ACE/ARB THERAPY RXD/TAKEN: CPT | Mod: CPTII,S$GLB,, | Performed by: OPHTHALMOLOGY

## 2023-11-01 PROCEDURE — 1159F MED LIST DOCD IN RCRD: CPT | Mod: CPTII,S$GLB,, | Performed by: OPHTHALMOLOGY

## 2023-11-01 PROCEDURE — 1160F RVW MEDS BY RX/DR IN RCRD: CPT | Mod: CPTII,S$GLB,, | Performed by: OPHTHALMOLOGY

## 2023-11-01 PROCEDURE — 1126F AMNT PAIN NOTED NONE PRSNT: CPT | Mod: CPTII,S$GLB,, | Performed by: OPHTHALMOLOGY

## 2023-11-01 PROCEDURE — 1159F PR MEDICATION LIST DOCUMENTED IN MEDICAL RECORD: ICD-10-PCS | Mod: CPTII,S$GLB,, | Performed by: OPHTHALMOLOGY

## 2023-11-01 NOTE — PROGRESS NOTES
Subjective:       Patient ID: Vivienne Jose is a 73 y.o. female.    Chief Complaint: Post-op Evaluation    HPI    Pt here for 3 week Post Op OU   Pt state no complaints at this time   Denies f/f  Last edited by Hi Webster on 11/1/2023  2:19 PM.             Assessment:       1. Post-operative state    2. Refractive error        Plan:       S/p CE OU- Doing well.   RE-Pt wants MRx.      Give MRx.  RTC Dr Mathews in 6 mos.

## 2023-11-02 ENCOUNTER — CLINICAL SUPPORT (OUTPATIENT)
Dept: ALLERGY | Facility: CLINIC | Age: 73
End: 2023-11-02
Payer: MEDICARE

## 2023-11-02 DIAGNOSIS — J30.9 CHRONIC ALLERGIC RHINITIS: Primary | ICD-10-CM

## 2023-11-02 PROCEDURE — 95115 PR IMMUNOTHERAPY, ONE INJECTION: ICD-10-PCS | Mod: HCNC,S$GLB,, | Performed by: STUDENT IN AN ORGANIZED HEALTH CARE EDUCATION/TRAINING PROGRAM

## 2023-11-02 PROCEDURE — 95115 IMMUNOTHERAPY ONE INJECTION: CPT | Mod: HCNC,S$GLB,, | Performed by: STUDENT IN AN ORGANIZED HEALTH CARE EDUCATION/TRAINING PROGRAM

## 2023-11-07 ENCOUNTER — OFFICE VISIT (OUTPATIENT)
Dept: SLEEP MEDICINE | Facility: CLINIC | Age: 73
End: 2023-11-07
Payer: MEDICARE

## 2023-11-07 VITALS
HEART RATE: 74 BPM | WEIGHT: 248 LBS | DIASTOLIC BLOOD PRESSURE: 68 MMHG | BODY MASS INDEX: 45.64 KG/M2 | HEIGHT: 62 IN | SYSTOLIC BLOOD PRESSURE: 147 MMHG

## 2023-11-07 DIAGNOSIS — G47.33 OSA (OBSTRUCTIVE SLEEP APNEA): Primary | ICD-10-CM

## 2023-11-07 PROCEDURE — 3078F DIAST BP <80 MM HG: CPT | Mod: CPTII,S$GLB,, | Performed by: PSYCHIATRY & NEUROLOGY

## 2023-11-07 PROCEDURE — 1126F AMNT PAIN NOTED NONE PRSNT: CPT | Mod: CPTII,S$GLB,, | Performed by: PSYCHIATRY & NEUROLOGY

## 2023-11-07 PROCEDURE — 99214 PR OFFICE/OUTPT VISIT, EST, LEVL IV, 30-39 MIN: ICD-10-PCS | Mod: S$GLB,,, | Performed by: PSYCHIATRY & NEUROLOGY

## 2023-11-07 PROCEDURE — 99999 PR PBB SHADOW E&M-EST. PATIENT-LVL IV: CPT | Mod: PBBFAC,HCNC,, | Performed by: PSYCHIATRY & NEUROLOGY

## 2023-11-07 PROCEDURE — 3008F BODY MASS INDEX DOCD: CPT | Mod: CPTII,S$GLB,, | Performed by: PSYCHIATRY & NEUROLOGY

## 2023-11-07 PROCEDURE — 3044F PR MOST RECENT HEMOGLOBIN A1C LEVEL <7.0%: ICD-10-PCS | Mod: CPTII,S$GLB,, | Performed by: PSYCHIATRY & NEUROLOGY

## 2023-11-07 PROCEDURE — 3044F HG A1C LEVEL LT 7.0%: CPT | Mod: CPTII,S$GLB,, | Performed by: PSYCHIATRY & NEUROLOGY

## 2023-11-07 PROCEDURE — 99999 PR PBB SHADOW E&M-EST. PATIENT-LVL IV: ICD-10-PCS | Mod: PBBFAC,HCNC,, | Performed by: PSYCHIATRY & NEUROLOGY

## 2023-11-07 PROCEDURE — 3288F FALL RISK ASSESSMENT DOCD: CPT | Mod: CPTII,S$GLB,, | Performed by: PSYCHIATRY & NEUROLOGY

## 2023-11-07 PROCEDURE — 1126F PR PAIN SEVERITY QUANTIFIED, NO PAIN PRESENT: ICD-10-PCS | Mod: CPTII,S$GLB,, | Performed by: PSYCHIATRY & NEUROLOGY

## 2023-11-07 PROCEDURE — 1159F PR MEDICATION LIST DOCUMENTED IN MEDICAL RECORD: ICD-10-PCS | Mod: CPTII,S$GLB,, | Performed by: PSYCHIATRY & NEUROLOGY

## 2023-11-07 PROCEDURE — 3077F PR MOST RECENT SYSTOLIC BLOOD PRESSURE >= 140 MM HG: ICD-10-PCS | Mod: CPTII,S$GLB,, | Performed by: PSYCHIATRY & NEUROLOGY

## 2023-11-07 PROCEDURE — 3008F PR BODY MASS INDEX (BMI) DOCUMENTED: ICD-10-PCS | Mod: CPTII,S$GLB,, | Performed by: PSYCHIATRY & NEUROLOGY

## 2023-11-07 PROCEDURE — 1159F MED LIST DOCD IN RCRD: CPT | Mod: CPTII,S$GLB,, | Performed by: PSYCHIATRY & NEUROLOGY

## 2023-11-07 PROCEDURE — 3078F PR MOST RECENT DIASTOLIC BLOOD PRESSURE < 80 MM HG: ICD-10-PCS | Mod: CPTII,S$GLB,, | Performed by: PSYCHIATRY & NEUROLOGY

## 2023-11-07 PROCEDURE — 4010F ACE/ARB THERAPY RXD/TAKEN: CPT | Mod: CPTII,S$GLB,, | Performed by: PSYCHIATRY & NEUROLOGY

## 2023-11-07 PROCEDURE — 1101F PR PT FALLS ASSESS DOC 0-1 FALLS W/OUT INJ PAST YR: ICD-10-PCS | Mod: CPTII,S$GLB,, | Performed by: PSYCHIATRY & NEUROLOGY

## 2023-11-07 PROCEDURE — 1101F PT FALLS ASSESS-DOCD LE1/YR: CPT | Mod: CPTII,S$GLB,, | Performed by: PSYCHIATRY & NEUROLOGY

## 2023-11-07 PROCEDURE — 3288F PR FALLS RISK ASSESSMENT DOCUMENTED: ICD-10-PCS | Mod: CPTII,S$GLB,, | Performed by: PSYCHIATRY & NEUROLOGY

## 2023-11-07 PROCEDURE — 4010F PR ACE/ARB THEARPY RXD/TAKEN: ICD-10-PCS | Mod: CPTII,S$GLB,, | Performed by: PSYCHIATRY & NEUROLOGY

## 2023-11-07 PROCEDURE — 3077F SYST BP >= 140 MM HG: CPT | Mod: CPTII,S$GLB,, | Performed by: PSYCHIATRY & NEUROLOGY

## 2023-11-07 PROCEDURE — 99214 OFFICE O/P EST MOD 30 MIN: CPT | Mod: S$GLB,,, | Performed by: PSYCHIATRY & NEUROLOGY

## 2023-11-07 PROCEDURE — 1157F ADVNC CARE PLAN IN RCRD: CPT | Mod: CPTII,S$GLB,, | Performed by: PSYCHIATRY & NEUROLOGY

## 2023-11-07 PROCEDURE — 1157F PR ADVANCE CARE PLAN OR EQUIV PRESENT IN MEDICAL RECORD: ICD-10-PCS | Mod: CPTII,S$GLB,, | Performed by: PSYCHIATRY & NEUROLOGY

## 2023-11-07 NOTE — PROGRESS NOTES
11/1/2023     8:33 PM 8/23/2022     4:40 PM 7/6/2021     8:27 AM 7/27/2020     7:00 PM 12/10/2019     7:14 PM 9/22/2019     2:07 PM 2/24/2019     9:12 PM   EPWORTH SLEEPINESS SCALE TOTAL SCORE    Total score 13 11 16 13 16 11 11       Vivienne Jose is a 73 y.o. female seen today for CPAP follow up. Last seen on 8/29/2022.    The patient reports improved sleep continuity and daytime sleepiness on PAP. ESS today is 8/24.  Denies break through snoring.  No  dry mouth.  No aerophagia or air hunger. Denies occasional mask leaks and discomfort. Using a Resmed LT nasal pillows for her. mask     Bedtime: 10:30  Sleep latency: fast  Nocturnal awakenings:1-2 ; can take 20 min  to return to sleep   Wake up time: 6:30-7 for work and till 9 AM on her days off    Patient ID: [id number]... Vivienne Jose  Compliance Summary  8/8/2023 - 11/5/2023 (90 days)  Days with Device Usage 90 days  Days without Device Usage 0 days  Percent Days with Device Usage 100.0%  Cumulative Usage 38 days 9 hrs. 50 mins. 53 secs.  Maximum Usage (1 Day) 13 hrs. 37 mins. 35 secs.  Average Usage (All Days) 10 hrs. 14 mins. 33 secs.  Average Usage (Days Used) 10 hrs. 14 mins. 33 secs.  Minimum Usage (1 Day) 6 hrs. 57 mins. 26 secs.  Percent of Days with Usage >= 4 Hours 100.0%  Percent of Days with Usage < 4 Hours 0.0%  Date Range  Total Blower Time 38 days 10 hrs. 42 mins. 52 secs.  Average AHI 1.4  Auto-CPAP Summary  Auto-CPAP Mean Pressure 10.8 cmH2O  Auto-CPAP Peak Average Pressure 15.8 cmH2O  Device Pressure <= 90% of Time 14.4 cmH2O  Average Time in Large Leak Per Day 0 secs.  Device Settings as of 11/5/2023  AutoCPAP - A-Flex  Device Settings  Device Mode  Parameter Value  Min Pressure 8 cmH2O  Max Pressure 20 cmH2O  A-Flex Setting 3  Auto Off Off  Auto On On  Ramp+ Time 30 minutes  Ramp+ Start Pressure 10.0 cmH2O  Mask Resistance X1  Tubing Type 15  Opti-Start On  EZ-Start Disabled  Patient Controls Access Off  Patient Data Access  Off  Tube Temperature 3  Humidifier 2    DME: [facility]  got machine in 2019 (original DS2); DS2 was sent as replaced by [facility]      Sleep studies:     PREVIOUS SLEEP STUDIES:     PSG/ SPLIT night study  In 2004 showed significant BAM with the AHI of 33.1/hour and SaO2 minimum of 88 %. Effective control of respiratory events was achieved at   10 cm H2O. Sleep efficiency was 79 %.  CPAP titration on 4/23/14: Adequate control of respiratory events was achieved at 12-13 cm of H2O. Weight 238 lbs. Frequent arousals were noted even when her respiratory events have been controlled.  CPAP titration on 9/19/18: Effective control of respiratory events was achieved at 12 cm of H2O, however SaO2 was high 80s-low 90s. Weight 241 lbs.        DME: [facility] - got DS recall replacement from [facility] in mid 2021 11/1/2023     8:33 PM 8/23/2022     4:40 PM 7/6/2021     8:27 AM 7/27/2020     7:00 PM 12/10/2019     7:14 PM 9/22/2019     2:07 PM 2/24/2019     9:12 PM   EPWORTH SLEEPINESS SCALE TOTAL SCORE    Total score 13 11 16 13 16 11 11       Vivienne Jose is a 73 y.o. female seen today for CPAP follow up. Last seen on 7/7/2021  Got her recall replacement - Dreamstation 2 in 2021    The patient reports improved sleep continuity and daytime sleepiness on PAP. ESS today is 11/24.  Denies break through snoring.  No dry mouth.   Ocasional   air hunger. NO significant mask leaks and discomfort.  Using with Durand for her Pillows.    APAP 4 to 20; 90% was 11.3 cm  She is not quite comfortable  -  starting pressure feels low (her recalled machine was set at 11-20      Taking Gabapentin and K+ for neuropathy and nocturnal muscle cramps. Taking Lasix and a potassium sparing diuretic.       Medications pertinent to sleep disorders: no  Previously tried medications:no    DME: [facility]  SLEEP STUDIES:    PREVIOUS SLEEP STUDIES:     PSG/ SPLIT night study  In 2004 showed significant BAM with the AHI of 33.1/hour and SaO2  minimum of 88 %. Effective control of respiratory events was achieved at   10 cm H2O. Sleep efficiency was 79 %.  CPAP titration on 4/23/14: Adequate control of respiratory events was achieved at 12-13 cm of H2O. Weight 238 lbs. Frequent arousals were noted even when her respiratory events have been controlled.  CPAP titration on 9/19/18: Effective control of respiratory events was achieved at 12 cm of H2O, however SaO2 was high 80s-low 90s. Weight 241 lbs.        DME: [facility] - got DS recall replacement from [facility] in mid 2021      PAST MEDICAL HISTORY:    Active Ambulatory Problems     Diagnosis Date Noted    Trigger middle finger of right hand 07/30/2012    Kidney stones 08/24/2012    Essential hypertension 08/24/2012    CAD (coronary artery disease) 11/23/2012    Chronic allergic rhinitis 11/30/2012    Nuclear sclerosis - Both Eyes 04/02/2013    Carpal tunnel syndrome of right wrist 07/08/2014    Proteinuria 02/03/2015    BAM (obstructive sleep apnea) 04/28/2015    Sacroiliitis 06/09/2015    Facet arthritis of lumbar region 06/09/2015    Physical deconditioning 06/09/2015    Osteopenia 09/22/2015    Vitamin D deficiency 09/22/2015    Hypocalcemia 02/29/2016    Morbid obesity with body mass index (BMI) of 40.0 to 44.9 in adult 02/29/2016    Recurrent HSV (herpes simplex virus) 03/21/2016    Chronic pain of right knee 05/02/2016    Gastroesophageal reflux disease 07/22/2016    Pure hypercholesterolemia 07/22/2016    Nephrolithiasis 12/14/2016    Hypokalemia 04/24/2017    Diabetic polyneuropathy associated with type 2 diabetes mellitus 04/24/2017    Diverticulosis of intestine without bleeding 08/02/2017    Skin nodule 08/02/2017    Facet arthritis of cervical region 07/25/2011    Bilateral carotid artery disease 07/26/2011    Type 2 diabetes mellitus with stage 3a chronic kidney disease, with long-term current use of insulin 02/23/2018    Interstitial cystitis 09/21/2018    S/P total knee  arthroplasty, right 03/07/2019    Decreased range of motion of right knee 04/05/2019    Muscle weakness of lower extremity 04/05/2019    Mild nonproliferative diabetic retinopathy of 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 edema associated with type 2 diabetes mellitus 04/18/2019    Hypothyroidism 08/05/2019    Thyroid nodule 11/19/2020    Other cirrhosis of liver 11/24/2020    History of recurrent UTI (urinary tract infection) 04/27/2022    De Quervain's tenosynovitis, left 07/07/2022    DEL RIO (nonalcoholic steatohepatitis) 08/19/2022    Edema leg 08/19/2022    Arrhythmia 03/06/2023    Abdominal aortic atherosclerosis 06/08/2023    Purpura 06/08/2023    Nuclear sclerotic cataract of right eye 09/19/2023    Nuclear sclerotic cataract of left eye 10/03/2023     Resolved Ambulatory Problems     Diagnosis Date Noted    Allergic conjunctivitis 11/23/2012    Post-surgical hypothyroidism 11/23/2012    Pain in limb 07/24/2014    Stiffness of joint 07/24/2014    Muscle weakness 07/24/2014    Screen for colon cancer 08/29/2014    UTI symptoms 11/05/2014    URI (upper respiratory infection) 01/07/2015    Dyslipidemia associated with type 2 diabetes mellitus 04/28/2015    LBP radiating to left leg 06/12/2015    Preventative health care 09/22/2015    Allergic rhinitis 09/22/2015    Type 2 diabetes mellitus, uncontrolled 09/22/2015    Hypertension associated with diabetes 09/22/2015    Sleep apnea 09/22/2015    Body mass index 45.0-49.9, adult 09/22/2015    Nausea & vomiting 11/23/2015    Dysuria 02/29/2016    Recurrent UTI 03/02/2017    Soft tissue mass 08/04/2017    Chronic kidney disease, stage III (moderate) 02/23/2018    Low vitamin D level 03/14/2018    Fatigue 06/21/2018    Gait instability 07/16/2018    Chronic left-sided low back pain with left-sided sciatica 10/22/2018    Hematuria 11/16/2018    Primary osteoarthritis of right knee 03/06/2019    Knee pain, right 04/05/2019    Gait abnormality 04/05/2019    Impaired flexibility of  "lower extremity 06/18/2021    Atrial fibrillation 06/08/2023     Past Medical History:   Diagnosis Date    Allergy     Angio-edema     Arthritis     Cataract     Cerebrovascular malformation     Cirrhosis 11/24/2020    Colon polyps     Diabetes mellitus, type 2     Diabetic peripheral neuropathy     Edema of both feet 8/19/2022    GERD (gastroesophageal reflux disease)     Herpes infection     Obesity, morbid     Ovarian cyst     Seizure disorder, focal motor     Type II or unspecified type diabetes mellitus with neurological manifestations, uncontrolled(250.62)     Urticaria          REVIEW OF SYSTEMS:   Sleep related symptoms as per HPI    reports weight gain - 50 lbs since last titration  Denies dyspnea  Denies palpitations  Denies acid reflux   Reports occasional polyuria  Reports  mood diturbance  Denies  anemia  Reports  muscle pain - joint stiffness due to arthritis    Otherwise, a balance of 10 systems reviewed is negative.    PHYSICAL EXAM:  BP (!) 147/68 (BP Location: Right arm, Patient Position: Sitting)   Pulse 74   Ht 5' 2" (1.575 m)   Wt 112.5 kg (248 lb)   LMP  (LMP Unknown)   BMI 45.36 kg/m²   GENERAL: Overweight body habitus, well groomed.      ASSESSMENT:    1.Previously diagnosed severe  BAM (obstructive sleep apnea). The patient symptomatically has  excessive daytime sleepiness, snoring,  witnessed breathing pauses, excessive daytime fatigue, gasping for air in sleep and interrupted sleep  with exam findings of "a crowded oral airway and elevated body mass index. The patient has medical co-morbidities of CAD, diabetes, heart disease and hypertension,  which can be worsened by BAM. Benefiting from CPAP use in terms of sleep continuity and daytime sleepiness. Compliant.    2. Significant residual sleepiness despite optimal AHI control and compliant use.    3. Muscle cramps at night - may be secondary to neuropathy and diuretics use. Moving the toes constantly. Some improvement sicne starting " B12 supplementation.       PLAN:    Continue  APAP from 8-20 cm H2O and reorder supplies  Will reorder supplies with Durand LT pilows and a rgular hose  Possibility of using Nuvigil, Provigil alertness promoting medication was discussed - the patient would not want to proceed at this point.  Will continue B12 supplementation that has been recently beneficial for muscle cramps.     More than 25 minutes of this 45 minutes visit was spent in counseling: during our discussion today, we talked about the etiology of BAM as well as the potential ramifications of untreated sleep apnea, which could include daytime sleepiness, hypertension, heart disease and/or stroke.  We discussed potential treatment options, which could include weight loss, body positioning, continuous positive airway pressure (CPAP), or referral for surgical consideration. Meanwhile, she  is urged to avoid supine sleep, weight gain and alcoholic beverages since all of these can worsen BAM.     Precautions: The patient was advised to abstain from driving should he feel sleepy or drowsy.    Follow up: 12 months
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
## 2023-11-24 ENCOUNTER — OFFICE VISIT (OUTPATIENT)
Dept: INTERNAL MEDICINE | Facility: CLINIC | Age: 73
End: 2023-11-24
Payer: MEDICARE

## 2023-11-24 VITALS
HEART RATE: 91 BPM | OXYGEN SATURATION: 97 % | DIASTOLIC BLOOD PRESSURE: 70 MMHG | SYSTOLIC BLOOD PRESSURE: 134 MMHG | BODY MASS INDEX: 45.15 KG/M2 | WEIGHT: 245.38 LBS | HEIGHT: 62 IN

## 2023-11-24 DIAGNOSIS — R05.1 ACUTE COUGH: ICD-10-CM

## 2023-11-24 DIAGNOSIS — J01.10 ACUTE NON-RECURRENT FRONTAL SINUSITIS: Primary | ICD-10-CM

## 2023-11-24 LAB
CTP QC/QA: YES
CTP QC/QA: YES
FLUAV AG NPH QL: NEGATIVE
FLUBV AG NPH QL: NEGATIVE
SARS-COV-2 RDRP RESP QL NAA+PROBE: NEGATIVE

## 2023-11-24 PROCEDURE — 1101F PT FALLS ASSESS-DOCD LE1/YR: CPT | Mod: HCNC,CPTII,S$GLB, | Performed by: STUDENT IN AN ORGANIZED HEALTH CARE EDUCATION/TRAINING PROGRAM

## 2023-11-24 PROCEDURE — 99214 PR OFFICE/OUTPT VISIT, EST, LEVL IV, 30-39 MIN: ICD-10-PCS | Mod: HCNC,S$GLB,, | Performed by: STUDENT IN AN ORGANIZED HEALTH CARE EDUCATION/TRAINING PROGRAM

## 2023-11-24 PROCEDURE — 87804 INFLUENZA ASSAY W/OPTIC: CPT | Mod: 59,QW,HCNC,S$GLB | Performed by: STUDENT IN AN ORGANIZED HEALTH CARE EDUCATION/TRAINING PROGRAM

## 2023-11-24 PROCEDURE — 3078F DIAST BP <80 MM HG: CPT | Mod: HCNC,CPTII,S$GLB, | Performed by: STUDENT IN AN ORGANIZED HEALTH CARE EDUCATION/TRAINING PROGRAM

## 2023-11-24 PROCEDURE — 87635: ICD-10-PCS | Mod: QW,HCNC,S$GLB, | Performed by: STUDENT IN AN ORGANIZED HEALTH CARE EDUCATION/TRAINING PROGRAM

## 2023-11-24 PROCEDURE — 99214 OFFICE O/P EST MOD 30 MIN: CPT | Mod: HCNC,S$GLB,, | Performed by: STUDENT IN AN ORGANIZED HEALTH CARE EDUCATION/TRAINING PROGRAM

## 2023-11-24 PROCEDURE — 1160F RVW MEDS BY RX/DR IN RCRD: CPT | Mod: HCNC,CPTII,S$GLB, | Performed by: STUDENT IN AN ORGANIZED HEALTH CARE EDUCATION/TRAINING PROGRAM

## 2023-11-24 PROCEDURE — 3044F PR MOST RECENT HEMOGLOBIN A1C LEVEL <7.0%: ICD-10-PCS | Mod: HCNC,CPTII,S$GLB, | Performed by: STUDENT IN AN ORGANIZED HEALTH CARE EDUCATION/TRAINING PROGRAM

## 2023-11-24 PROCEDURE — 3075F PR MOST RECENT SYSTOLIC BLOOD PRESS GE 130-139MM HG: ICD-10-PCS | Mod: HCNC,CPTII,S$GLB, | Performed by: STUDENT IN AN ORGANIZED HEALTH CARE EDUCATION/TRAINING PROGRAM

## 2023-11-24 PROCEDURE — 4010F ACE/ARB THERAPY RXD/TAKEN: CPT | Mod: HCNC,CPTII,S$GLB, | Performed by: STUDENT IN AN ORGANIZED HEALTH CARE EDUCATION/TRAINING PROGRAM

## 2023-11-24 PROCEDURE — 1157F ADVNC CARE PLAN IN RCRD: CPT | Mod: HCNC,CPTII,S$GLB, | Performed by: STUDENT IN AN ORGANIZED HEALTH CARE EDUCATION/TRAINING PROGRAM

## 2023-11-24 PROCEDURE — 1126F AMNT PAIN NOTED NONE PRSNT: CPT | Mod: HCNC,CPTII,S$GLB, | Performed by: STUDENT IN AN ORGANIZED HEALTH CARE EDUCATION/TRAINING PROGRAM

## 2023-11-24 PROCEDURE — 99999 PR PBB SHADOW E&M-EST. PATIENT-LVL V: ICD-10-PCS | Mod: PBBFAC,HCNC,, | Performed by: STUDENT IN AN ORGANIZED HEALTH CARE EDUCATION/TRAINING PROGRAM

## 2023-11-24 PROCEDURE — 1157F PR ADVANCE CARE PLAN OR EQUIV PRESENT IN MEDICAL RECORD: ICD-10-PCS | Mod: HCNC,CPTII,S$GLB, | Performed by: STUDENT IN AN ORGANIZED HEALTH CARE EDUCATION/TRAINING PROGRAM

## 2023-11-24 PROCEDURE — 99999 PR PBB SHADOW E&M-EST. PATIENT-LVL V: CPT | Mod: PBBFAC,HCNC,, | Performed by: STUDENT IN AN ORGANIZED HEALTH CARE EDUCATION/TRAINING PROGRAM

## 2023-11-24 PROCEDURE — 3008F PR BODY MASS INDEX (BMI) DOCUMENTED: ICD-10-PCS | Mod: HCNC,CPTII,S$GLB, | Performed by: STUDENT IN AN ORGANIZED HEALTH CARE EDUCATION/TRAINING PROGRAM

## 2023-11-24 PROCEDURE — 87804 POCT INFLUENZA A/B: ICD-10-PCS | Mod: 59,QW,HCNC,S$GLB | Performed by: STUDENT IN AN ORGANIZED HEALTH CARE EDUCATION/TRAINING PROGRAM

## 2023-11-24 PROCEDURE — 1159F PR MEDICATION LIST DOCUMENTED IN MEDICAL RECORD: ICD-10-PCS | Mod: HCNC,CPTII,S$GLB, | Performed by: STUDENT IN AN ORGANIZED HEALTH CARE EDUCATION/TRAINING PROGRAM

## 2023-11-24 PROCEDURE — 3008F BODY MASS INDEX DOCD: CPT | Mod: HCNC,CPTII,S$GLB, | Performed by: STUDENT IN AN ORGANIZED HEALTH CARE EDUCATION/TRAINING PROGRAM

## 2023-11-24 PROCEDURE — 3078F PR MOST RECENT DIASTOLIC BLOOD PRESSURE < 80 MM HG: ICD-10-PCS | Mod: HCNC,CPTII,S$GLB, | Performed by: STUDENT IN AN ORGANIZED HEALTH CARE EDUCATION/TRAINING PROGRAM

## 2023-11-24 PROCEDURE — 1160F PR REVIEW ALL MEDS BY PRESCRIBER/CLIN PHARMACIST DOCUMENTED: ICD-10-PCS | Mod: HCNC,CPTII,S$GLB, | Performed by: STUDENT IN AN ORGANIZED HEALTH CARE EDUCATION/TRAINING PROGRAM

## 2023-11-24 PROCEDURE — 4010F PR ACE/ARB THEARPY RXD/TAKEN: ICD-10-PCS | Mod: HCNC,CPTII,S$GLB, | Performed by: STUDENT IN AN ORGANIZED HEALTH CARE EDUCATION/TRAINING PROGRAM

## 2023-11-24 PROCEDURE — 1159F MED LIST DOCD IN RCRD: CPT | Mod: HCNC,CPTII,S$GLB, | Performed by: STUDENT IN AN ORGANIZED HEALTH CARE EDUCATION/TRAINING PROGRAM

## 2023-11-24 PROCEDURE — 87635 SARS-COV-2 COVID-19 AMP PRB: CPT | Mod: QW,HCNC,S$GLB, | Performed by: STUDENT IN AN ORGANIZED HEALTH CARE EDUCATION/TRAINING PROGRAM

## 2023-11-24 PROCEDURE — 3288F FALL RISK ASSESSMENT DOCD: CPT | Mod: HCNC,CPTII,S$GLB, | Performed by: STUDENT IN AN ORGANIZED HEALTH CARE EDUCATION/TRAINING PROGRAM

## 2023-11-24 PROCEDURE — 3044F HG A1C LEVEL LT 7.0%: CPT | Mod: HCNC,CPTII,S$GLB, | Performed by: STUDENT IN AN ORGANIZED HEALTH CARE EDUCATION/TRAINING PROGRAM

## 2023-11-24 PROCEDURE — 1126F PR PAIN SEVERITY QUANTIFIED, NO PAIN PRESENT: ICD-10-PCS | Mod: HCNC,CPTII,S$GLB, | Performed by: STUDENT IN AN ORGANIZED HEALTH CARE EDUCATION/TRAINING PROGRAM

## 2023-11-24 PROCEDURE — 1101F PR PT FALLS ASSESS DOC 0-1 FALLS W/OUT INJ PAST YR: ICD-10-PCS | Mod: HCNC,CPTII,S$GLB, | Performed by: STUDENT IN AN ORGANIZED HEALTH CARE EDUCATION/TRAINING PROGRAM

## 2023-11-24 PROCEDURE — 3288F PR FALLS RISK ASSESSMENT DOCUMENTED: ICD-10-PCS | Mod: HCNC,CPTII,S$GLB, | Performed by: STUDENT IN AN ORGANIZED HEALTH CARE EDUCATION/TRAINING PROGRAM

## 2023-11-24 PROCEDURE — 3075F SYST BP GE 130 - 139MM HG: CPT | Mod: HCNC,CPTII,S$GLB, | Performed by: STUDENT IN AN ORGANIZED HEALTH CARE EDUCATION/TRAINING PROGRAM

## 2023-11-24 RX ORDER — PROMETHAZINE HYDROCHLORIDE AND DEXTROMETHORPHAN HYDROBROMIDE 6.25; 15 MG/5ML; MG/5ML
5 SYRUP ORAL EVERY 8 HOURS PRN
Qty: 180 ML | Refills: 0 | Status: SHIPPED | OUTPATIENT
Start: 2023-11-24 | End: 2023-12-06

## 2023-11-24 RX ORDER — BENZONATATE 200 MG/1
200 CAPSULE ORAL 3 TIMES DAILY PRN
Qty: 30 CAPSULE | Refills: 0 | Status: SHIPPED | OUTPATIENT
Start: 2023-11-24 | End: 2023-12-04

## 2023-11-24 RX ORDER — AZITHROMYCIN 250 MG/1
TABLET, FILM COATED ORAL
Qty: 6 TABLET | Refills: 0 | Status: SHIPPED | OUTPATIENT
Start: 2023-11-24 | End: 2023-11-29

## 2023-11-24 NOTE — PATIENT INSTRUCTIONS
Sore Throat: Warm salt water gargles to alleviate sore throat or lozenges. I think that the best treatment is Chloraseptic Spray. You spray the back of your throat with it, hold it for 15 seconds, then spit it out. You can repeat this every 2 hours as needed for sore throat.       Nasal/Sinus congestion: Take an antihistamine daily (allegra/ zyrtec/ xyzal/ or claritin). This will also help with runny nose, sneezing, watery eyes. Use a nasal steroid spray such as Flonase (fluticasone) or Nasacort (triamcinolone) daily. This will also help with ear pressure/fluid.Generic medications are okay. You can also consider Afrin nasal spray if congestion is severe, but avoid using for more than 3 days in a row due to risk of dependence.      Cough: Dextromethorphan (DM, delsym) will help to suppress coughing. Do not drive if drowsy.You can add guaifenesin (mucinex) to help breath up thick sputum- make sure to drink plenty of water in order to help thin the mucus. I will also send you some Benzonatate (Tessalon Pearls) for cough. You can take these three times a day as needed for cough.      NSAIDs (aleve, advil) and tylenol can help with pain, aches and fever.     To avoid spreading symptoms: wash hands or use hand  frequently. Cover face when coughing. Do not share utensils, etc.      Let clinic know if your symptoms have not improved by middle of next week.     Go to the ER if you have any sustained high fever (>101F), sudden chest pain, worsening shortness of breath, inability to eat/drink, or any concerning symptom.

## 2023-11-24 NOTE — PROGRESS NOTES
aSUBJECTIVE     Chief Complaint   Patient presents with    Cough    sore chest    Laryngitis       HPI  Vivienne Jose is a 73 y.o. female with  HTN, HLD, T2DM, CAD, AAA, bilateral coronary arthery disease, h/o nephrolithiasis, hypothyroidism, GERD, DEL RIO, osteopenia, BAM  that presents for  same-day urgent care evaluation  of URI.     This is a new patient to me but established to [facility]. PCP is Aislinn Magallon MD.     Upper Respiratory Infection:   Patient complains of symptoms of a URI.   Symptoms include dry cough and sore throat.   Onset of symptoms was 3 days ago, gradually worsening since that time.   She also c/o  hoarseness, b/l frontal sinus pressure, post-tussive nausea, low appetite, SOB with cough, intermittent right ear pain  for the past 3 days .    She is drinking plenty of fluids.   Evaluation to date: none.   Treatment to date:  Cepacol, Tessalon Perles () .   has similar symptoms that started prior to the onset of her own symptoms.    COVID and Flu tests were both negative in clinic today.     PAST MEDICAL HISTORY:  Past Medical History:   Diagnosis Date    Allergy     Angio-edema     Arthritis     Cataract     bilateral - not removed    Cerebrovascular malformation     Cirrhosis 2020    Colon polyps     Diabetes mellitus, type 2     Diabetic peripheral neuropathy     Edema of both feet 2022    GERD (gastroesophageal reflux disease)     Herpes infection     Hypothyroidism     Kidney stones     Mild nonproliferative diabetic retinopathy of 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 edema associated with type 2 diabetes mellitus 2019    DEL RIO (nonalcoholic steatohepatitis) 2022    Nausea & vomiting 2015    Obesity, morbid     Ovarian cyst     Seizure disorder, focal motor     Sleep apnea     Type II or unspecified type diabetes mellitus with neurological manifestations, uncontrolled(250.62)     Urticaria        PAST SURGICAL HISTORY:  Past Surgical History:   Procedure  Laterality Date    CARPAL TUNNEL RELEASE Right 2014    CATARACT EXTRACTION W/  INTRAOCULAR LENS IMPLANT Right 9/19/2023    Procedure: EXTRACTION, CATARACT, WITH IOL INSERTION;  Surgeon: Lyndon Ortiz MD;  Location: [facility] OR;  Service: Ophthalmology;  Laterality: Right;    CATARACT EXTRACTION W/  INTRAOCULAR LENS IMPLANT Left 10/3/2023    Procedure: EXTRACTION, CATARACT, WITH IOL INSERTION;  Surgeon: Lyndon Ortiz MD;  Location: [facility] OR;  Service: Ophthalmology;  Laterality: Left;    COLONOSCOPY      COLONOSCOPY N/A 9/13/2017    Procedure: COLONOSCOPY Golytely;  Surgeon: Gisela Wall MD;  Location: [facility];  Service: Endoscopy;  Laterality: N/A;    COLONOSCOPY N/A 9/9/2022    Procedure: COLONOSCOPY Extended Suprep;  Surgeon: Britt Jasso MD;  Location: [facility];  Service: Endoscopy;  Laterality: N/A;    CYST REMOVAL      skin; multiples    ESOPHAGOGASTRODUODENOSCOPY Left 10/15/2021    Procedure: EGD (ESOPHAGOGASTRODUODENOSCOPY);  Surgeon: Luis Fernando Taveras MD;  Location: [facility] (4TH FLR);  Service: Endoscopy;  Laterality: Left;  cirrhosis labwork am of procedure  last seizure 1973  COVID test on 10/12/21 at [facility]    ESOPHAGOGASTRODUODENOSCOPY N/A 3/10/2023    Procedure: EGD (ESOPHAGOGASTRODUODENOSCOPY);  Surgeon: Christa Caldera MD;  Location: [facility] (2ND FLR);  Service: Endoscopy;  Laterality: N/A;  Medically Urgent  cirrohsis-labs done on 2/9/23 (< 90 days)  New onset A-fib  3/1 instructions to portal-st  Precall complete- [place]    EXTRACORPOREAL SHOCK WAVE LITHOTRIPSY  2002    HYSTERECTOMY  1984    JOINT REPLACEMENT Right 03/06/2019    knee    KIDNEY STONE SURGERY      KNEE ARTHROPLASTY Right 3/6/2019    Procedure: ARTHROPLASTY, KNEE;  Surgeon: Pj Gamboa MD;  Location: [facility];  Service: Orthopedics;  Laterality: Right;  Depuy (Stephen notified 2/21, CC)    THYROIDECTOMY  1977         TONSILLECTOMY, ADENOIDECTOMY      TRIGGER FINGER RELEASE      TUBAL LIGATION          FAMILY HISTORY:  Family History   Problem Relation Age of Onset    Skin cancer Mother     Macular degeneration Mother     Dementia Mother     Hypertension Mother     Cancer Father     Arthritis Father     Gout Father     No Known Problems Daughter     Diabetes Daughter     Hypertension Daughter     Arthritis Daughter     Allergies Son     Allergic rhinitis Son     Glaucoma Maternal Aunt         Great Maternal Aunt    Leukemia Maternal Uncle     Amblyopia Neg Hx     Cataracts Neg Hx     Retinal detachment Neg Hx     Strabismus Neg Hx     Stroke Neg Hx     Thyroid disease Neg Hx     Kidney disease Neg Hx     Angioedema Neg Hx     Asthma Neg Hx     Atopy Neg Hx     Eczema Neg Hx     Immunodeficiency Neg Hx     Rhinitis Neg Hx     Urticaria Neg Hx        ALLERGIES AND MEDICATIONS: updated and reviewed.  Review of patient's allergies indicates:   Allergen Reactions    Sulfa (sulfonamide antibiotics) Nausea Only    Ciprofloxacin     Januvia [sitagliptin]      abd pain    Lisinopril     Lotensin [benazepril]     Nexium [esomeprazole magnesium] Other (See Comments)     Gas     Current Outpatient Medications   Medication Sig Dispense Refill    ascorbic acid, vitamin C, (VITAMIN C) 100 MG tablet Take 500 mg by mouth 2 (two) times daily.       aspirin (ECOTRIN) 81 MG EC tablet Take 81 mg by mouth once daily.      azelastine (ASTELIN) 137 mcg (0.1 %) nasal spray 2 sprays (274 mcg total) by Nasal route 2 (two) times daily. 120 mL 4    blood sugar diagnostic (TRUE METRIX GLUCOSE TEST STRIP) Strp TEST TWO TIMES DAILY 200 strip 6    blood-glucose meter Misc [employer] True Metrix Air meter 1 each 0    calcium carbonate (CALCIUM 500 ORAL) Take 1,000 mg by mouth.      calcium carbonate-vitamin D3 600 mg(1,500mg) -100 unit Cap Take 1 tablet by mouth once daily.      estradioL (ESTRING) 2 mg (7.5 mcg /24 hour) vaginal ring Remove old Estring, place the new one every 3 months. 1 each 3    fluticasone propionate (FLONASE) 50  "mcg/actuation nasal spray 2 sprays (100 mcg total) by Each Nostril route once daily. 48 g 4    furosemide (LASIX) 20 MG tablet Take 1 tablet (20 mg total) by mouth once daily. 30 tablet 11    gabapentin (NEURONTIN) 600 MG tablet Take 1 tablet (600 mg total) by mouth 2 (two) times daily. 180 tablet 3    glipiZIDE (GLUCOTROL) 5 MG tablet TAKE 1 TABLET TWICE DAILY BEFORE A MEAL 180 tablet 0    ipratropium (ATROVENT) 42 mcg (0.06 %) nasal spray 2 sprays by Each Nostril route 4 (four) times daily. 45 mL 4    ketotifen (ZADITOR) 0.025 % (0.035 %) ophthalmic solution Place 1-2 drops into both eyes once daily.      L.acid-B.bifidum-B.animal-FOS 25 billion cell -100 mg Cap Take 1 capsule by mouth once daily.      lancets (TRUEPLUS LANCETS) 28 gauge Misc Inject 1 lancet into the skin 2 (two) times daily before meals. 200 each 6    levothyroxine (SYNTHROID) 75 MCG tablet TAKE 1 TABLET EVERY DAY 90 tablet 3    loratadine (CLARITIN) 10 mg tablet Take 10 mg by mouth once daily.      lovastatin (MEVACOR) 40 MG tablet TAKE 1 TABLET NIGHTLY (SUBSTITUTED FOR MEVACOR) 90 tablet 1    metFORMIN (GLUCOPHAGE) 1000 MG tablet TAKE 1 TABLET TWICE DAILY WITH MEALS 180 tablet 1    mv-mn/folic acid/vit [id number] (ALIVE ONCE DAILY WOMEN 50 PLUS ORAL) Take 1 tablet by mouth once daily.      omeprazole (PRILOSEC) 20 MG capsule Take 1 capsule (20 mg total) by mouth daily as needed (acid reflux). 90 capsule 3    OZEMPIC 0.25 mg or 0.5 mg (2 mg/3 mL) pen injector INJECT 0.5MG UNDER THE SKIN ONE TIME WEEKLY (Patient taking differently: Tues) 9 each 0    pen needle, diabetic (BD ULTRA-FINE EDUARDO PEN NEEDLE) 32 gauge x 5/32" Ndle USE AS DIRECTED 200 each 6    semaglutide (OZEMPIC) 0.25 mg or 0.5 mg(2 mg/1.5 mL) pen injector Inject 0.5 mg into the skin every 7 days. 3 pen 4    spironolactone (ALDACTONE) 50 MG tablet Take 1 tablet (50 mg total) by mouth once daily. 30 tablet 11    traMADoL (ULTRAM) 50 mg tablet Take 1 tablet (50 mg total) by mouth every 8 " (eight) hours as needed for Pain. 21 tablet 0    TRESIBA FLEXTOUCH U-100 100 unit/mL (3 mL) insulin pen INJECT 38 UNITS INTO THE SKIN EVERY EVENING. (Patient taking differently: INJECT 25 UNITS INTO THE SKIN EVERY EVENING.) 45 mL 2    UNABLE TO FIND Take 1 tablet by mouth 2 (two) times a day. medication name: Magnesium 400mg, Calcium 1000 mg, D3 15mcg, Zinc 15mg      valACYclovir (VALTREX) 500 MG tablet Take 1 tablet (500 mg total) by mouth once daily. (Patient taking differently: Take 500 mg by mouth as needed.) 90 tablet 3    valsartan (DIOVAN) 160 MG tablet TAKE 1 TABLET TWICE DAILY 180 tablet 2     No current facility-administered medications for this visit.       ROS  Review of Systems   Constitutional:  Positive for appetite change and fatigue. Negative for chills and fever.   HENT:  Positive for congestion, ear pain, rhinorrhea, sinus pressure, sinus pain and sore throat. Negative for ear discharge, postnasal drip and sneezing.    Eyes: Negative.    Respiratory:  Positive for cough and shortness of breath. Negative for chest tightness and wheezing.    Cardiovascular:  Negative for chest pain and palpitations.   Gastrointestinal:  Positive for nausea. Negative for abdominal pain, constipation, diarrhea and vomiting.   Endocrine: Negative.    Genitourinary: Negative.    Musculoskeletal:  Negative for arthralgias and myalgias.   Skin: Negative.    Allergic/Immunologic: Negative.    Neurological: Negative.    Hematological: Negative.          OBJECTIVE     Physical Exam  Vitals:    11/24/23 1500   BP: 134/70   Pulse: 91    Body mass index is 44.88 kg/m².            Physical Exam  Vitals reviewed.   Constitutional:       General: She is not in acute distress.     Appearance: Normal appearance.   HENT:      Head: Normocephalic and atraumatic.      Right Ear: External ear normal. A middle ear effusion (serous) is present. Tympanic membrane is not injected, erythematous or bulging.      Left Ear: Tympanic membrane,  ear canal and external ear normal.      Nose: Mucosal edema and congestion present. No rhinorrhea.      Right Sinus: Frontal sinus tenderness present. No maxillary sinus tenderness.      Left Sinus: Frontal sinus tenderness present. No maxillary sinus tenderness.      Mouth/Throat:      Mouth: Mucous membranes are moist.      Pharynx: Oropharynx is clear. Uvula midline. Posterior oropharyngeal erythema present. No pharyngeal swelling, oropharyngeal exudate or uvula swelling.      Tonsils: No tonsillar exudate or tonsillar abscesses. 0 on the right. 0 on the left.   Eyes:      Extraocular Movements: Extraocular movements intact.      Conjunctiva/sclera: Conjunctivae normal.      Pupils: Pupils are equal, round, and reactive to light.   Cardiovascular:      Rate and Rhythm: Normal rate and regular rhythm.      Pulses: Normal pulses.      Heart sounds: Normal heart sounds.   Pulmonary:      Effort: Pulmonary effort is normal.      Breath sounds: Normal breath sounds.   Abdominal:      General: There is no distension.   Musculoskeletal:         General: Normal range of motion.      Cervical back: Normal range of motion and neck supple. No rigidity or tenderness.   Lymphadenopathy:      Cervical: No cervical adenopathy.   Skin:     General: Skin is warm and dry.   Neurological:      General: No focal deficit present.      Mental Status: She is alert.           Health Maintenance         Date Due Completion Date    RSV Vaccine (Age 60+ and Pregnant patients) (1 - 1-dose 60+ series) Never done ---    COVID-19 Vaccine (3 - Moderna risk series) 09/12/2022 8/15/2022    Diabetes Urine Screening 08/11/2023 8/11/2022    Foot Exam 01/17/2024 1/17/2023 (Done)    Override on 1/17/2023: Done    Override on 8/2/2017: Done    Override on 4/28/2015: Done    Hemoglobin A1c 02/17/2024 8/17/2023    TETANUS VACCINE 06/19/2024 6/19/2014    Eye Exam 07/24/2024 7/24/2023    Lipid Panel 08/17/2024 8/17/2023    Mammogram 10/27/2024 10/27/2023     Aspirin/Antiplatelet Therapy 11/07/2024 11/7/2023    DEXA Scan 06/12/2025 6/12/2023    Colorectal Cancer Screening 09/09/2027 9/9/2022              ASSESSMENT     73 y.o. female with     1. Acute non-recurrent frontal sinusitis    2. Acute cough        PLAN:     1. Acute cough  - Counseled on symptomatic treatment, return precautions.   - promethazine-dextromethorphan (PROMETHAZINE-DM) 6.25-15 mg/5 mL Syrp; Take 5 mLs by mouth every 8 (eight) hours as needed (cough).  Dispense: 180 mL; Refill: 0  - benzonatate (TESSALON) 200 MG capsule; Take 1 capsule (200 mg total) by mouth 3 (three) times daily as needed for Cough.  Dispense: 30 capsule; Refill: 0  - POCT COVID-19 Rapid Screening  - POCT Influenza A/B Rapid Antigen    2. Acute non-recurrent frontal sinusitis  - azithromycin (Z-TAWANA) 250 MG tablet; Take 2 tablets by mouth on day 1; Take 1 tablet by mouth on days 2-5  Dispense: 6 tablet; Refill: 0        RTC PRN       Gopi Salmeron MD  Family Medicine  [facility]  11/24/2023    This document was created using voice recognition software (M*Modal Fluency Direct). Although it may be edited, this document may contain errors related to incorrect recognition of the spoken word. Please call the physician if clarification is needed.       No follow-ups on file.

## 2023-12-04 ENCOUNTER — CLINICAL SUPPORT (OUTPATIENT)
Dept: ALLERGY | Facility: CLINIC | Age: 73
End: 2023-12-04
Payer: MEDICARE

## 2023-12-04 DIAGNOSIS — J30.9 CHRONIC ALLERGIC RHINITIS: Primary | ICD-10-CM

## 2023-12-04 PROCEDURE — 95115 PR IMMUNOTHERAPY, ONE INJECTION: ICD-10-PCS | Mod: HCNC,S$GLB,, | Performed by: ALLERGY & IMMUNOLOGY

## 2023-12-04 PROCEDURE — 95115 IMMUNOTHERAPY ONE INJECTION: CPT | Mod: HCNC,S$GLB,, | Performed by: ALLERGY & IMMUNOLOGY

## 2023-12-07 ENCOUNTER — CLINICAL SUPPORT (OUTPATIENT)
Dept: REHABILITATION | Facility: HOSPITAL | Age: 73
End: 2023-12-07
Attending: PHYSICIAN ASSISTANT
Payer: MEDICARE

## 2023-12-07 DIAGNOSIS — R29.3 POOR POSTURE: ICD-10-CM

## 2023-12-07 DIAGNOSIS — M47.816 SPONDYLOSIS OF LUMBAR REGION WITHOUT MYELOPATHY OR RADICULOPATHY: ICD-10-CM

## 2023-12-07 DIAGNOSIS — R29.898 DECREASED STRENGTH OF TRUNK AND BACK: ICD-10-CM

## 2023-12-07 DIAGNOSIS — M25.69 DECREASED RANGE OF MOTION OF TRUNK AND BACK: Primary | ICD-10-CM

## 2023-12-07 PROCEDURE — 97750 PHYSICAL PERFORMANCE TEST: CPT | Mod: 32

## 2023-12-07 PROCEDURE — 97110 THERAPEUTIC EXERCISES: CPT

## 2023-12-09 PROBLEM — R29.3 POOR POSTURE: Status: ACTIVE | Noted: 2023-12-09

## 2023-12-09 PROBLEM — R29.898 DECREASED STRENGTH OF TRUNK AND BACK: Status: ACTIVE | Noted: 2023-12-09

## 2023-12-09 PROBLEM — M25.69 DECREASED RANGE OF MOTION OF TRUNK AND BACK: Status: ACTIVE | Noted: 2023-12-09

## 2023-12-09 NOTE — PLAN OF CARE
[facility]  Physical Therapy Lumbar Evaluation      Name: Vivienne Jose  Clinic Number: [id number]    Therapy Diagnosis:   Encounter Diagnoses   Name Primary?    Spondylosis of lumbar region without myelopathy or radiculopathy     Decreased range of motion of trunk and back Yes    Decreased strength of trunk and back     Poor posture      Physician: Deja Feldman, *    Physician Orders: PT Eval and Treat  Medical Diagnosis from Referral: M47.816 (ICD-10-CM) - Spondylosis of lumbar region without myelopathy or radiculopathy   Evaluation Date: 12/7/2023  Authorization Period Expiration: 12/31/2024  Plan of Care Expiration: 3/7/2024  Reassessment Due: 1/7/2024  Visit # / Visits authorized: 1/1 (pending)  MedX testing visit 2    Time In: 9:10 AM  Time Out: 10:00 AM  Total Billable Time: 50 minutes  INSURANCE and OUTCOMES: Fee for Service with FOTO Outcomes 1/3    Precautions: standard, diabetes, Hx of R TKA     Pattern of pain determined: 2    Subjective   Date of onset: chronic condition     History of current condition: Vivienne reports history of low back pain for several years. She describes back pain as achy and burning; states it is worst on her Right side. She denies any radicular symptoms. She attended [facility] program in 2018 and states she responded well. She had a Right TKA in 2019 and states a few months after she felt the back pain return. Aggravating factors include getting into/out of car, prolonged standing, and walking. She reports difficulty standing more than 5 minutes such as when washing dishes.        Medical History:   Past Medical History:   Diagnosis Date    Allergy     Angio-edema     Arthritis     Cataract     bilateral - not removed    Cerebrovascular malformation     Cirrhosis 11/24/2020    Colon polyps     Diabetes mellitus, type 2     Diabetic peripheral neuropathy     Edema of both feet 8/19/2022    GERD (gastroesophageal reflux disease)      Herpes infection     Hypothyroidism     Kidney stones     Mild nonproliferative diabetic retinopathy of 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 edema associated with type 2 diabetes mellitus 4/18/2019    DEL RIO (nonalcoholic steatohepatitis) 8/19/2022    Nausea & vomiting 11/23/2015    Obesity, morbid     Ovarian cyst     Seizure disorder, focal motor     Sleep apnea     Type II or unspecified type diabetes mellitus with neurological manifestations, uncontrolled(250.62)     Urticaria      Surgical History:   Vivienne Jose  has a past surgical history that includes Thyroidectomy (1977); Carpal tunnel release (Right, 2014); Cyst Removal; Tubal ligation; Colonoscopy; TONSILLECTOMY, ADENOIDECTOMY; Extracorporeal shock wave lithotripsy (2002); Colonoscopy (N/A, 9/13/2017); Trigger finger release; Kidney stone surgery; Knee Arthroplasty (Right, 3/6/2019); Joint replacement (Right, 03/06/2019); Hysterectomy (1984); Esophagogastroduodenoscopy (Left, 10/15/2021); Colonoscopy (N/A, 9/9/2022); Esophagogastroduodenoscopy (N/A, 3/10/2023); Cataract extraction w/  intraocular lens implant (Right, 9/19/2023); and Cataract extraction w/  intraocular lens implant (Left, 10/3/2023).    Medications:   Vivienne has a current medication list which includes the following prescription(s): ascorbic acid (vitamin c), aspirin, azelastine, blood sugar diagnostic, blood-glucose meter, calcium carbonate, calcium carbonate-vitamin d3, estring, fluticasone propionate, furosemide, gabapentin, glipizide, ipratropium, ketotifen, l.acid-b.animalis,bifidum-fos, lancets, levothyroxine, loratadine, lovastatin, metformin, mv-mn/folic acid/vit [id number], omeprazole, ozempic, pen needle, diabetic, semaglutide, spironolactone, tramadol, tresiba flextouch u-100, UNABLE TO FIND, valacyclovir, and valsartan.    Allergies:   Review of patient's allergies indicates:   Allergen Reactions    Sulfa (sulfonamide antibiotics) Nausea Only    Ciprofloxacin     Januvia [sitagliptin]       abd pain    Lisinopril     Lotensin [benazepril]     Nexium [esomeprazole magnesium] Other (See Comments)     Gas      Imaging: CT Lumbar spine 9/18/2023:   FINDINGS:  Bones/joints: Broad-based disc bulge, facet hypertrophy, and ligament hypertrophy at L4/L5 consistent with spinal stenosis. . Broad-based disc bulge at L5/S1 consistent with degenerative disc disease.. There is no evidence of acute fracture in any of the visualized osseous structures.. There is no evidence of malalignment or dislocation of any visualized joint. Posterior spur formation at L2/L3 Kidneys and ureters: Nonobstructing left renal calculi. 6 cm simple cyst right kidney. . No follow-up imaging recommended . Vasculature: Vascular calcifications in the splenic artery Soft tissues: Unremarkable     Impression:   1. Broad-based disc bulge, facet hypertrophy, and ligament hypertrophy at L4/L5 consistent with spinal stenosis. . 2. Broad-based disc bulge at L5/S1 consistent with degenerative disc disease.. 3. There is no evidence of acute fracture in any of the visualized osseous structures.. 4. There is no evidence of malalignment or dislocation of any visualized joint.     Prior Therapy: [facility] in 2018, for R TKA in 2019  Prior Treatment: PT, dry needling  Social History: lives with spouse,   Occupation: clerical work part-time  Leisure: playing games on computer       Prior Level of Function: IND  Current Level of Function: difficulty standing > 5 min, prolonged walking  DME owned/used:   [employer]hip: [employer]    Pain:  Current 0/10, worst 8/10, best 0/10   Location: low back R > L  Description: Aching and Burning  Aggravating Factors: Standing and Walking  Easing Factors: sitting, lying down  Disturbed Sleep: no    Pattern of pain questions:  1.  Where is your pain the worst? Low back   2.  Is your pain constant or intermittent? Intermittent   3.  Does bending forward make your typical pain worse? No   4.  Since the start of  your back pain, has there been a change in your bowel or bladder? No   5.  What can't you do now that you use to be able to do? Stand > 5 minutes, walk long distances    Pts goals: improve standing and walking tolerance, walk for exercise    Red Flag Screening:   Cough/Sneeze Strain: (--)  Bladder/Bowel: (--)  Falls: (--)  Night pain: (--)  Unexplained weight loss: (--)  General health: sedentary lifestyle     Objective    Postural examination/scapula alignment: Rounded shoulder, Slouched posture, and Increased kyphosis  Joint integrity: Firm end feeling  Skin integrity:WNL   Edema: None  Correction of posture: better with lumbar roll  Sitting: slouched, rounded shoulders  Standing: increased lumbar lordosis    MOVEMENT LOSS - Lumbar   Norms ROM Loss Initial   Flexion Fingers touch toes, sacral angle >/= 70 deg, uniform spinal curvature, posterior weight shift  minimal loss   Extension ASIS surpasses toes, spine of scapulae surpasses heels, uniform spinal curve moderate loss   Side glide Right  minimal loss   Side glide Left  minimal loss   Rotation Right PT observes contralateral shoulder moderate loss   Rotation Left PT observes contralateral shoulder moderate loss     Lower Extremity Strength  Right LE  Left LE    Hip flexion: 4/5 Hip flexion: 4/5   Hip extension:  4/5 Hip extension: 4/5   Hip abduction: 4-/5 Hip abduction: 4/5   Knee Flexion 5/5 Knee Flexion 5/5   Knee Extension 5/5 Knee Extension 5/5   Ankle dorsiflexion: 5/5 Ankle dorsiflexion: 5/5   Ankle plantarflexion: 5/5 Ankle plantarflexion: 5/5     GAIT:  Assistive Device used: none  Level of Assistance: independent  Patient displays the following gait deviations:     Special Tests:   Test Name  Test Result   Prone Instability Test (--)   SI Joint Provocation Test (--)   Straight Leg Raise (--)   Neural Tension Test NT   Crossed Straight Leg Raise (--)   Walking on toes Able   Walking on heels  Able     NEUROLOGICAL SCREENING:     Sensory deficits:  Intact to light touch B LE    Reflexes:    Left Right   Patella Tendon 1+ 1+   Achilles Tendon NT NT   Clonus (--) (--)     REPEATED TEST MOVEMENTS:    Baseline symptoms:  Repeated Flexion in Standing no effect   Repeated Extension in Standing end range pain  worse   Repeated Flexion in lying no effect   Repeated Extension in lying  no effect     STATIC TESTS and other movements:   Prone lie no effect   Prone lie on elbows no effect   Sitting slouched  no effect   Sitting erect no effect, mild discomfort   Standing slouched no worse  no better   Standing erect  no worse  no better   Lying prone in extension  no worse  no better   Long sitting   NT   Sustained flexion no effect   Sustained prone using mat no effect     Lumbar testing Visit 2    OUTCOMES SELECTION:   Intake Outcome Measure for FOTO Lumbar Survey    Therapist reviewed FOTO scores for Vivienne Jose on 12/7/2023.   FOTO documents entered into [employer] - see Media section.    Intake Score: 51% functional ability  Goal Score: 60% functional ability        Treatment     Total Treatment time separate from Evaluation: 15 minutes    Vivienne received therapeutic exercises to develop/improve posture, lumbar ROM, strength, and muscular endurance for 10 minutes including the following exercises:     Written Home Exercises Provided: yes.    HEP AS FOLLOWS:  Supine PPT  Bridges  Sidelying clamshells    Exercises were reviewed and Vivienne was able to demonstrate them prior to the end of the session. Vivienne demonstrated good  understanding of the education provided.     See EMR under Patient Instructions for exercises provided 12/7/2023.    Vivienne received the following manual therapy techniques:  were applied to the:  for 00 minutes.     MedX Testing:  MedX testing to be performed next visit    Therapeutic Education/Activity provided for 5 minutes:   - Patient was given an [facility] Visit 1 handout which discusses the following:  - what to expect in therapy  - an  overview of the program, including health coaching and wellness  - importance of spinal hygiene, proper posture, lifting mechanics, sleep quality, and nutrition/hydration   - Hien roll trialed, recommended, and purchase information was provided.  - Patient received a handout regarding anticipated muscular soreness following the isometric test and strategies for management were reviewed with patient including stretching, using ice and scheduled rest.   - Patient received verbal education on the following:   - Healthy [facility] program   - purpose of the isometric test  - safe progression of lumbar strengthening, wellness approach, and systemic strengthening.   - safe usage of MedX machine and testing protocols.    Vivienne received cold pack for 00 minutes to  in [place].    Assessment   Vivienne is a 73 y.o. female referred to [facility] with a medical diagnosis of M47.816 (ICD-10-CM) - Spondylosis of lumbar region without myelopathy or radiculopathy . Upon physical assessment, pt demonstrates slouched posture in sitting, hip weakness bilaterally, and trunk and hip mobility deficits. All of the above noted supports potential lumbar classification as a pattern 2 with recurrent/or consistent symptoms, thus pt is a good candidate for the Healthy [facility] Program. Pt would benefit from LE and trunk mobility training, stability training,  improved cardiovascular and muscular endurance, neuromuscular re-education for posture, coordination, and muscular recruitment and education on positional offloading techniques to decrease the intensity and frequency of flare-ups.      Pain Pattern: 2       Pt prognosis is Good.     Pt will benefit from skilled outpatient Physical Therapy to address the deficits stated above and in the chart below, to provide pt/family education, and to maximize pt's level of independence. Based on the above history and physical examination an active physical therapy program is recommended.      Plan of  care discussed with patient: Yes  Pt's spiritual, cultural and educational needs considered and patient is agreeable to the plan of care and goals as stated below:     Anticipated Barriers for therapy: none    PT Evaluation Completed? Yes    Medical necessity is demonstrated by the following problem list:    History  Co-morbidities and personal factors that may impact the plan of care [] LOW: no personal factors / co-morbidities  [x] MODERATE: 1-2 personal factors / co-morbidities  [] HIGH: 3+ personal factors / co-morbidities    Moderate / High Support Documentation:   Co-morbidities affecting plan of care: diabetes, R TKA    Personal Factors:   age     Examination  Body Structures and Functions, activity limitations and participation restrictions that may impact the plan of care [] LOW: addressing 1-2 elements  [x] MODERATE: 3+ elements  [] HIGH: 4+ elements (please support below)    Moderate / High Support Documentation: decreased lumbar ROM, B hip weakness, poor posture     Clinical Presentation [x] LOW: stable  [] MODERATE: Evolving  [] HIGH: Unstable     Decision Making/ Complexity Score: low         GOALS: Pt is in agreement with the following goals.    Short term goals:  6 weeks or 10 visits   - Pt will demonstrate increased lumbar MedX ROM by at least 3 degrees from the initial ROM value with improvements noted in functional ROM and ability to perform ADLs. Appropriate and Ongoing  - Pt will demonstrate increased MedX average isometric strength value by 20% from initial test resulting in improved ability to perform bending, lifting, and carrying activities safely, confidently. Appropriate and Ongoing  - Pt will report a reduction in worst pain score by 2-3 points for improved tolerance for standing. Appropriate and Ongoing  - Pt able to perform HEP correctly with minimal cueing or supervision from therapist to encourage independent management of symptoms. Appropriate and Ongoing    Long term goals: 10 weeks  or 20 visits   - Pt will demonstrate increased lumbar MedX ROM by at least 9 degrees from initial ROM value, resulting in improved ability to perform functional forward bending while standing and sitting. Appropriate and Ongoing  - Pt will demonstrate increased MedX average isometric strength value by 40% from initial test resulting in improved ability to perform bending, lifting, and carrying activities safely and confidently. Appropriate and Ongoing  - Pt to demonstrate ability to independently control and reduce their pain through posture positioning and mechanical movements throughout a typical day. Appropriate and Ongoing  - Pt will demonstrate reduced pain and improved functional outcomes as reported on the FOTO by reaching an intake score of >/= 60% functional ability in order to demonstrate subjective improvement in patient's condition. . Appropriate and Ongoing  - Pt will demonstrate independence with the HEP at discharge. Appropriate and Ongoing  - Pt will be able to stand at least 30 minutes while doing household tasks. (patient goal) Appropriate and Ongoing    Plan     Outpatient physical therapy 2x week for 10 weeks or 20 visits to include the following:   - Patient education  - Therapeutic exercise  - Manual therapy  - Performance testing   - Neuromuscular Re-education  - Therapeutic activity   - Modalities    Pt may be seen by PTA as part of the rehabilitation team.     Therapist: Angelica Maldonado, ROSA  12/7/2023

## 2023-12-13 ENCOUNTER — OFFICE VISIT (OUTPATIENT)
Dept: UROLOGY | Facility: CLINIC | Age: 73
End: 2023-12-13
Payer: MEDICARE

## 2023-12-13 VITALS
HEART RATE: 74 BPM | SYSTOLIC BLOOD PRESSURE: 138 MMHG | BODY MASS INDEX: 45.28 KG/M2 | HEIGHT: 62 IN | DIASTOLIC BLOOD PRESSURE: 71 MMHG | WEIGHT: 246.06 LBS

## 2023-12-13 DIAGNOSIS — N39.0 RECURRENT UTI: ICD-10-CM

## 2023-12-13 DIAGNOSIS — N95.2 VAGINAL ATROPHY: ICD-10-CM

## 2023-12-13 PROCEDURE — 3078F PR MOST RECENT DIASTOLIC BLOOD PRESSURE < 80 MM HG: ICD-10-PCS | Mod: CPTII,S$GLB,, | Performed by: UROLOGY

## 2023-12-13 PROCEDURE — 4010F PR ACE/ARB THEARPY RXD/TAKEN: ICD-10-PCS | Mod: CPTII,S$GLB,, | Performed by: UROLOGY

## 2023-12-13 PROCEDURE — 1157F ADVNC CARE PLAN IN RCRD: CPT | Mod: CPTII,S$GLB,, | Performed by: UROLOGY

## 2023-12-13 PROCEDURE — 1101F PR PT FALLS ASSESS DOC 0-1 FALLS W/OUT INJ PAST YR: ICD-10-PCS | Mod: CPTII,S$GLB,, | Performed by: UROLOGY

## 2023-12-13 PROCEDURE — 1125F PR PAIN SEVERITY QUANTIFIED, PAIN PRESENT: ICD-10-PCS | Mod: CPTII,S$GLB,, | Performed by: UROLOGY

## 2023-12-13 PROCEDURE — 1125F AMNT PAIN NOTED PAIN PRSNT: CPT | Mod: CPTII,S$GLB,, | Performed by: UROLOGY

## 2023-12-13 PROCEDURE — 1157F PR ADVANCE CARE PLAN OR EQUIV PRESENT IN MEDICAL RECORD: ICD-10-PCS | Mod: CPTII,S$GLB,, | Performed by: UROLOGY

## 2023-12-13 PROCEDURE — 3075F SYST BP GE 130 - 139MM HG: CPT | Mod: CPTII,S$GLB,, | Performed by: UROLOGY

## 2023-12-13 PROCEDURE — 1101F PT FALLS ASSESS-DOCD LE1/YR: CPT | Mod: CPTII,S$GLB,, | Performed by: UROLOGY

## 2023-12-13 PROCEDURE — 1159F PR MEDICATION LIST DOCUMENTED IN MEDICAL RECORD: ICD-10-PCS | Mod: CPTII,S$GLB,, | Performed by: UROLOGY

## 2023-12-13 PROCEDURE — 81002 PR URINALYSIS NONAUTO W/O SCOPE: ICD-10-PCS | Mod: S$GLB,,, | Performed by: UROLOGY

## 2023-12-13 PROCEDURE — 1159F MED LIST DOCD IN RCRD: CPT | Mod: CPTII,S$GLB,, | Performed by: UROLOGY

## 2023-12-13 PROCEDURE — 99999 PR PBB SHADOW E&M-EST. PATIENT-LVL IV: ICD-10-PCS | Mod: PBBFAC,,, | Performed by: UROLOGY

## 2023-12-13 PROCEDURE — 3288F FALL RISK ASSESSMENT DOCD: CPT | Mod: CPTII,S$GLB,, | Performed by: UROLOGY

## 2023-12-13 PROCEDURE — 3075F PR MOST RECENT SYSTOLIC BLOOD PRESS GE 130-139MM HG: ICD-10-PCS | Mod: CPTII,S$GLB,, | Performed by: UROLOGY

## 2023-12-13 PROCEDURE — 3044F PR MOST RECENT HEMOGLOBIN A1C LEVEL <7.0%: ICD-10-PCS | Mod: CPTII,S$GLB,, | Performed by: UROLOGY

## 2023-12-13 PROCEDURE — 3044F HG A1C LEVEL LT 7.0%: CPT | Mod: CPTII,S$GLB,, | Performed by: UROLOGY

## 2023-12-13 PROCEDURE — 3288F PR FALLS RISK ASSESSMENT DOCUMENTED: ICD-10-PCS | Mod: CPTII,S$GLB,, | Performed by: UROLOGY

## 2023-12-13 PROCEDURE — 81002 URINALYSIS NONAUTO W/O SCOPE: CPT | Mod: S$GLB,,, | Performed by: UROLOGY

## 2023-12-13 PROCEDURE — 99214 OFFICE O/P EST MOD 30 MIN: CPT | Mod: S$GLB,,, | Performed by: UROLOGY

## 2023-12-13 PROCEDURE — 3078F DIAST BP <80 MM HG: CPT | Mod: CPTII,S$GLB,, | Performed by: UROLOGY

## 2023-12-13 PROCEDURE — 99999 PR PBB SHADOW E&M-EST. PATIENT-LVL IV: CPT | Mod: PBBFAC,,, | Performed by: UROLOGY

## 2023-12-13 PROCEDURE — 4010F ACE/ARB THERAPY RXD/TAKEN: CPT | Mod: CPTII,S$GLB,, | Performed by: UROLOGY

## 2023-12-13 PROCEDURE — 3008F BODY MASS INDEX DOCD: CPT | Mod: CPTII,S$GLB,, | Performed by: UROLOGY

## 2023-12-13 PROCEDURE — 99214 PR OFFICE/OUTPT VISIT, EST, LEVL IV, 30-39 MIN: ICD-10-PCS | Mod: S$GLB,,, | Performed by: UROLOGY

## 2023-12-13 PROCEDURE — 3008F PR BODY MASS INDEX (BMI) DOCUMENTED: ICD-10-PCS | Mod: CPTII,S$GLB,, | Performed by: UROLOGY

## 2023-12-13 RX ORDER — ESTRADIOL 2 MG/1
SYSTEM VAGINAL
Qty: 1 EACH | Refills: 3 | Status: SHIPPED | OUTPATIENT
Start: 2023-12-13

## 2023-12-13 NOTE — PROGRESS NOTES
CHIEF COMPLAINT:    Mrs. Jose is a 73 y.o. female presenting for a 1 year follow up on vaginal atrophy.   PRESENTING ILLNESS:    Vivienne Jose is a 73 y.o. female who presents with a history of vaginal atrophy, recurrent UTI's.  She has been doing very well from the standpoint of the recurrent UTI's not having one in the last year.  She states that she sometimes has issues reaching around to the vagina to insert the next one, after removing the previous one.     She and her  celebrated their 50th Wedding anniversary.  He accompanied her today. In the past year, the only surgery she had was cataract removal and her vision is better, it is easier to drive at night.     REVIEW OF SYSTEMS:    Review of Systems   Constitutional: Negative.    HENT: Negative.     Eyes: Negative.    Respiratory: Negative.     Cardiovascular: Negative.    Gastrointestinal: Negative.    Genitourinary: Negative.    Musculoskeletal:  Positive for joint pain.   Skin: Negative.    Neurological: Negative.    Endo/Heme/Allergies:         Type II diabetes mellitus   Psychiatric/Behavioral: Negative.         PATIENT HISTORY:    Past Medical History:   Diagnosis Date    Allergy     Angio-edema     Arthritis     Cataract     bilateral - not removed    Cerebrovascular malformation     Cirrhosis 11/24/2020    Colon polyps     Diabetes mellitus, type 2     Diabetic peripheral neuropathy     Edema of both feet 8/19/2022    GERD (gastroesophageal reflux disease)     Herpes infection     Hypothyroidism     Kidney stones     Mild nonproliferative diabetic retinopathy of 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 edema associated with type 2 diabetes mellitus 4/18/2019    DEL RIO (nonalcoholic steatohepatitis) 8/19/2022    Nausea & vomiting 11/23/2015    Obesity, morbid     Ovarian cyst     Seizure disorder, focal motor     Sleep apnea     Type II or unspecified type diabetes mellitus with neurological manifestations, uncontrolled(250.62)     Urticaria        Past  Surgical History:   Procedure Laterality Date    CARPAL TUNNEL RELEASE Right 2014    CATARACT EXTRACTION W/  INTRAOCULAR LENS IMPLANT Right 9/19/2023    Procedure: EXTRACTION, CATARACT, WITH IOL INSERTION;  Surgeon: Lyndon Ortiz MD;  Location: [facility] OR;  Service: Ophthalmology;  Laterality: Right;    CATARACT EXTRACTION W/  INTRAOCULAR LENS IMPLANT Left 10/3/2023    Procedure: EXTRACTION, CATARACT, WITH IOL INSERTION;  Surgeon: Lyndon Ortiz MD;  Location: [facility] OR;  Service: Ophthalmology;  Laterality: Left;    COLONOSCOPY      COLONOSCOPY N/A 9/13/2017    Procedure: COLONOSCOPY Golytely;  Surgeon: Gisela Wall MD;  Location: [facility];  Service: Endoscopy;  Laterality: N/A;    COLONOSCOPY N/A 9/9/2022    Procedure: COLONOSCOPY Extended Suprep;  Surgeon: Britt Jasso MD;  Location: [facility];  Service: Endoscopy;  Laterality: N/A;    CYST REMOVAL      skin; multiples    ESOPHAGOGASTRODUODENOSCOPY Left 10/15/2021    Procedure: EGD (ESOPHAGOGASTRODUODENOSCOPY);  Surgeon: Luis Fernando Taveras MD;  Location: [facility] (4TH FLR);  Service: Endoscopy;  Laterality: Left;  cirrhosis labwork am of procedure  last seizure 1973  COVID [facility] on 10/12/21 [facility]    ESOPHAGOGASTRODUODENOSCOPY N/A 3/10/2023    Procedure: EGD (ESOPHAGOGASTRODUODENOSCOPY);  Surgeon: Christa Caldera MD;  Location: [facility] (2ND FLR);  Service: Endoscopy;  Laterality: N/A;  Medically Urgent  cirrohsis-labs done on 2/9/23 (< 90 days)  New onset A-fib  3/1 instructions to portal-st  Precall complete- [place]    EXTRACORPOREAL SHOCK WAVE LITHOTRIPSY  2002    HYSTERECTOMY  1984    JOINT REPLACEMENT Right 03/06/2019    knee    KIDNEY STONE SURGERY      KNEE ARTHROPLASTY Right 3/6/2019    Procedure: ARTHROPLASTY, KNEE;  Surgeon: Pj Gamboa MD;  Location: [facility];  Service: Orthopedics;  Laterality: Right;  Depuy (Stephen notified 2/21, CC)    THYROIDECTOMY  1977         TONSILLECTOMY, ADENOIDECTOMY      TRIGGER FINGER  RELEASE      TUBAL LIGATION         Family History   Problem Relation Age of Onset    Skin cancer Mother     Macular degeneration Mother     Dementia Mother     Hypertension Mother     Cancer Father     Arthritis Father     Gout Father     No Known Problems Daughter     Diabetes Daughter     Hypertension Daughter     Arthritis Daughter     Allergies Son     Allergic rhinitis Son     Glaucoma Maternal Aunt         Great Maternal Aunt    Leukemia Maternal Uncle      Social History     Socioeconomic History    Marital status:    Occupational History    Occupation: retired     Employer: [employer]   Tobacco Use    Smoking status: Never    Smokeless tobacco: Never   Substance and Sexual Activity    Alcohol use: No     Alcohol/week: 0.0 standard drinks of alcohol    Drug use: No    Sexual activity: Yes     Partners: Male     Social Determinants of Health     Financial Resource Strain: Low Risk  (11/24/2023)    Overall Financial Resource Strain (CARDIA)     Difficulty of Paying Living Expenses: Not hard at all   Food Insecurity: No Food Insecurity (11/24/2023)    Hunger Vital Sign     Worried About Running Out of Food in the Last Year: Never true     Ran Out of Food in the Last Year: Never true   Transportation Needs: No Transportation Needs (11/24/2023)    PRAPARE - Transportation     Lack of Transportation (Medical): No     Lack of Transportation (Non-Medical): No   Physical Activity: Inactive (11/24/2023)    Exercise Vital Sign     Days of Exercise per Week: 0 days     Minutes of Exercise per Session: 0 min   Stress: No Stress Concern Present (11/24/2023)    [employer] - Occupational Stress Questionnaire     Feeling of Stress : Only a little   Recent Concern: Stress - Stress Concern Present (10/4/2023)    [employer] - Occupational Stress Questionnaire     Feeling of Stress : To some extent   Social Connections: Unknown (11/24/2023)    Social  Connection and Isolation Panel [NHANES]     Frequency of Communication with Friends and Family: Patient refused     Frequency of Social Gatherings with Friends and Family: Patient refused     Active Member of Clubs or Organizations: No     Attends Club or Organization Meetings: Never     Marital Status:    Housing Stability: Low Risk  (11/24/2023)    Housing Stability Vital Sign     Unable to Pay for Housing in the Last Year: No     Number of Places Lived in the Last Year: 1     Unstable Housing in the Last Year: No       Allergies:  Sulfa (sulfonamide antibiotics), Ciprofloxacin, Januvia [sitagliptin], Lisinopril, Lotensin [benazepril], and Nexium [esomeprazole magnesium]    Medications:  Outpatient Encounter Medications as of 12/13/2023   Medication Sig Dispense Refill    ascorbic acid, vitamin C, (VITAMIN C) 100 MG tablet Take 500 mg by mouth 2 (two) times daily.       aspirin (ECOTRIN) 81 MG EC tablet Take 81 mg by mouth once daily.      blood sugar diagnostic (TRUE METRIX GLUCOSE TEST STRIP) Strp TEST TWO TIMES DAILY 200 strip 6    blood-glucose meter Misc [employer] True Metrix Air meter 1 each 0    calcium carbonate (CALCIUM 500 ORAL) Take 1,000 mg by mouth.      calcium carbonate-vitamin D3 600 mg(1,500mg) -100 unit Cap Take 1 tablet by mouth once daily.      estradioL (ESTRING) 2 mg (7.5 mcg /24 hour) vaginal ring Remove old Estring, place the new one every 3 months. 1 each 3    fluticasone propionate (FLONASE) 50 mcg/actuation nasal spray 2 sprays (100 mcg total) by Each Nostril route once daily. 48 g 4    furosemide (LASIX) 20 MG tablet Take 1 tablet (20 mg total) by mouth once daily. 30 tablet 11    gabapentin (NEURONTIN) 600 MG tablet Take 1 tablet (600 mg total) by mouth 2 (two) times daily. 180 tablet 3    glipiZIDE (GLUCOTROL) 5 MG tablet TAKE 1 TABLET TWICE DAILY BEFORE A MEAL 180 tablet 0    ipratropium (ATROVENT) 42 mcg (0.06 %) nasal spray 2 sprays by Each Nostril route 4 (four) times daily.  "45 mL 4    ketotifen (ZADITOR) 0.025 % (0.035 %) ophthalmic solution Place 1-2 drops into both eyes once daily.      L.acid-B.bifidum-B.animal-FOS 25 billion cell -100 mg Cap Take 1 capsule by mouth once daily.      lancets (TRUEPLUS LANCETS) 28 gauge Misc Inject 1 lancet into the skin 2 (two) times daily before meals. 200 each 6    levothyroxine (SYNTHROID) 75 MCG tablet TAKE 1 TABLET EVERY DAY 90 tablet 3    loratadine (CLARITIN) 10 mg tablet Take 10 mg by mouth once daily.      lovastatin (MEVACOR) 40 MG tablet TAKE 1 TABLET NIGHTLY (SUBSTITUTED FOR MEVACOR) 90 tablet 1    metFORMIN (GLUCOPHAGE) 1000 MG tablet TAKE 1 TABLET TWICE DAILY WITH MEALS 180 tablet 1    mv-mn/folic acid/vit [id number] (ALIVE ONCE DAILY WOMEN 50 PLUS ORAL) Take 1 tablet by mouth once daily.      omeprazole (PRILOSEC) 20 MG capsule Take 1 capsule (20 mg total) by mouth daily as needed (acid reflux). 90 capsule 3    OZEMPIC 0.25 mg or 0.5 mg (2 mg/3 mL) pen injector INJECT 0.5MG UNDER THE SKIN ONE TIME WEEKLY (Patient taking differently: Tuclaudio) 9 each 0    pen needle, diabetic (BD ULTRA-FINE EDUARDO PEN NEEDLE) 32 gauge x 5/32" Ndle USE AS DIRECTED 200 each 6    semaglutide (OZEMPIC) 0.25 mg or 0.5 mg(2 mg/1.5 mL) pen injector Inject 0.5 mg into the skin every 7 days. 3 pen 4    spironolactone (ALDACTONE) 50 MG tablet Take 1 tablet (50 mg total) by mouth once daily. 30 tablet 11    traMADoL (ULTRAM) 50 mg tablet Take 1 tablet (50 mg total) by mouth every 8 (eight) hours as needed for Pain. 21 tablet 0    TRESIBA FLEXTOUCH U-100 100 unit/mL (3 mL) insulin pen INJECT 38 UNITS INTO THE SKIN EVERY EVENING. (Patient taking differently: INJECT 25 UNITS INTO THE SKIN EVERY EVENING.) 45 mL 2    UNABLE TO FIND Take 1 tablet by mouth 2 (two) times a day. medication name: Magnesium 400mg, Calcium 1000 mg, D3 15mcg, Zinc 15mg      valACYclovir (VALTREX) 500 MG tablet Take 1 tablet (500 mg total) by mouth once daily. (Patient taking differently: Take 500 mg " by mouth as needed.) 90 tablet 3    valsartan (DIOVAN) 160 MG tablet TAKE 1 TABLET TWICE DAILY 180 tablet 2    azelastine (ASTELIN) 137 mcg (0.1 %) nasal spray 2 sprays (274 mcg total) by Nasal route 2 (two) times daily. 120 mL 4    [] azithromycin (Z-TAWANA) 250 MG tablet Take 2 tablets by mouth on day 1; Take 1 tablet by mouth on days 2-5 6 tablet 0    [] benzonatate (TESSALON) 200 MG capsule Take 1 capsule (200 mg total) by mouth 3 (three) times daily as needed for Cough. 30 capsule 0    [] promethazine-dextromethorphan (PROMETHAZINE-DM) 6.25-15 mg/5 mL Syrp Take 5 mLs by mouth every 8 (eight) hours as needed (cough). 180 mL 0     No facility-administered encounter medications on file as of 2023.         PHYSICAL EXAMINATION:    The patient generally appears in good health, is appropriately interactive, and is in no apparent distress.    Skin: No lesions.    Mental: Cooperative with normal affect.    Neuro: Grossly intact.    HEENT: Normal. No evidence of lymphadenopathy.    Chest:  normal inspiratory effort.    Extremities: No clubbing, cyanosis, or edema      LABS:    Lab Results   Component Value Date    BUN 21 10/04/2023    CREATININE 1.0 10/04/2023       UA 1.020, pH 6, otherwise, negative.     IMPRESSION:    Vaginal atrophy  History of recurrent UTI    PLAN:    1. Refilled the prescription  2.  Follow up in 1 year.     I spent 30 minutes with the patient of which more than half was spent in direct consultation with the patient in regards to our treatment and plan.

## 2023-12-14 ENCOUNTER — CLINICAL SUPPORT (OUTPATIENT)
Dept: REHABILITATION | Facility: HOSPITAL | Age: 73
End: 2023-12-14
Payer: MEDICARE

## 2023-12-14 DIAGNOSIS — R29.898 DECREASED STRENGTH OF TRUNK AND BACK: ICD-10-CM

## 2023-12-14 DIAGNOSIS — M25.69 DECREASED RANGE OF MOTION OF TRUNK AND BACK: Primary | ICD-10-CM

## 2023-12-14 DIAGNOSIS — R29.3 POOR POSTURE: ICD-10-CM

## 2023-12-14 PROCEDURE — 97110 THERAPEUTIC EXERCISES: CPT

## 2023-12-14 PROCEDURE — 97112 NEUROMUSCULAR REEDUCATION: CPT

## 2023-12-14 NOTE — PROGRESS NOTES
[facility] - [facility]ALTHY BACK  Physical Therapy Treatment Note     Name: Vivienne Jose  Clinic Number: [id number]    Therapy Diagnosis:   Encounter Diagnoses   Name Primary?    Decreased range of motion of trunk and back Yes    Decreased strength of trunk and back     Poor posture      Physician: Deja Feldman, *    Visit Date: 12/14/2023    Physician Orders: PT Eval and Treat  Medical Diagnosis from Referral: M47.816 (ICD-10-CM) - Spondylosis of lumbar region without myelopathy or radiculopathy   Evaluation Date: 12/7/2023  Authorization Period Expiration: 12/31/2024  Reassessment Due: 1/7/2024  Plan of Care Certification Period: 3/7/2024  Visit #/Visits authorized: 2/20   MedX Testing:MedX testing visit 2    PTA Visit #: 0/5     Time In: 8:05 AM  Time Out: 8:50 AM  Total Billable Time: 45 minutes  INSURANCE and OUTCOMES: Fee for Service with FOTO Outcomes 1/3    Precautions: standard, diabetes, Hx of R TKA     Pattern of pain determined: 2    Subjective     Vivienne reports during initial evaluation last week she feels that she pulled a muscle on Left side of her ribs during testing. She has been feeling increased pain to Left rib musculature, but she feels that it is slowing improving     Patient reports tolerating previous visit   Patient reports their pain to be 3/10 on a 0-10 scale with 0 being no pain and 10 being the worst pain imaginable.  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   Pt goals: improve standing and walking tolerance, walk for exercise     Objective      Lumbar  Isometric Testing on Med X equipment: Testing administered by PT    Test Initial Baseline Midpoint Final   Date 12/14/23     ROM 0-42 deg     Max Peak Torque 142      Min Peak Torque 71      Flex/Ext Ratio 2:1     % below normative data 9     % gain from initial test Not available visit 1     Pt appeared to be pushing through B LE during  testing      Outcomes:  Intake Score: 51% functional ability  Visit 6 Score:   Visit 10 Score:   Discharge Score:  Goal Score: 60% functional ability      Treatment     Vivienne received the treatments listed below:      Medical MedX Treatment as follows:  MedX testing performed day 2: Patient  received neuromuscular education to engage spinal musculature correctly for motor control and engagement of musculature for 15 minutes including the MedX exercise component and practice and standard testing. MedX dynamic exercise and baseline isometric test performed with instructions to guide the patient safely through the testing procedure. Patient instructed to perform isometric test correctly and safely while building to an optimal force with a pain-free effort. Patient also instructed that they should feel support/pressure from MedX restraints but no pain/discomfort, and encouraged to report any pain to therapist. Patient demonstrated appropriate understanding of information and tolerance of test.  Education regarding purpose of test, safety during test given, and reviewed possible more soreness and strategies.           12/14/2023     8:18 AM   [facility]   Visit Number 2   VAS Pain Rating 3   Time 5   Lumbar Extension Seat Pad 1   Femur Restraint 6   Top Dead Center 24   Counterweight 373   Lumbar Flexion 42   Lumbar Extension 0   Lumbar Peak Torque 142 ft. lbs.   Min Torque 71     Vivienne participated in neuromuscular re-education activities to improve balance, coordination, proprioception, motor control and/or posture for 00 minutes. The following activities were included:       Vivienne participated in therapeutic exercises to develop strength, ROM, and core stabilization for 30 minutes including:    LTR x10  Supine PPT 5 sec hold x10  Bridges x10  SOC x10      Peripheral muscle strengthening which included one set of 15-20 repetitions at a slow and controlled 10-13 second per rep pace focused on strengthening  supporting musculature in order to improve body mechanics and functional mobility. Patient and therapist focused on proper form during treatment to ensure optimal strengthening of each targeted muscle group.  Machines utilized included:  To be added next visit:Torso rotation, Leg Ext, Leg Curl, Chest Press, Rowing, Triceps, Biceps, Hip Abd, Hip Add, and Leg Press      Vivienne participated in dynamic functional therapeutic activities to improve functional performance and simulate household and community activities for 00  minutes. The following activities were included:      Vivienne received manual therapy techniques for 00  minutes. The following activities were included:      Pt given cold pack for 00 minutes to low back in .    Patient Education and Home Exercises     Home exercises include: PPT, bridges, clamshells   Cardio program (V5): -  Lifting education (V11): -  Posture/Lumbar roll:   Fridge Magnet Discharge handout (date given): -  Equipment at home/gym membership: [employer]    Education provided:   - PT role and POC  - HEP  - MedX testing  - Cues with exercises    Written Home Exercises Provided: Patient instructed to cont prior HEP.  Exercises were reviewed and Vivienne was able to demonstrate them prior to the end of the session.  Vivienne demonstrated good  understanding of the education provided.     See EMR under Patient Instructions for exercises provided prior visit.    Assessment   Vivienne presents to second healthy back visit reporting minimal low back pain, but reports increased pain to Left rib musculature. She was able to demo HEP with Min VC for form. Pt was able to tolerate Medical MedX machine well as follows:  MedX testing performed and patient tolerated test well. Per lumbar MedX testing pt was 9% above average for isometric strength, but patient pushed through B LE during testing. Peripheral strengthening exercises not performed today to c/o Left rib pain. Plan to initiate MedX dynamic  exercise and peripheral strengthening exercises next visit.     Patient is making  progress towards established goals.  Pt will continue to benefit from skilled outpatient physical therapy to address the deficits stated in the impairment chart, provide pt/family education and to maximize pt's level of independence in the home and community environment.     Anticipated Barriers for therapy: none  Pt's spiritual, cultural and educational needs considered and pt agreeable to plan of care and goals as stated below:     Goals:   Short term goals:  6 weeks or 10 visits   - Pt will demonstrate increased lumbar MedX ROM by at least 3 degrees from the initial ROM value with improvements noted in functional ROM and ability to perform ADLs. Appropriate and Ongoing  - Pt will demonstrate increased MedX average isometric strength value by 20% from initial test resulting in improved ability to perform bending, lifting, and carrying activities safely, confidently. Appropriate and Ongoing  - Pt will report a reduction in worst pain score by 2-3 points for improved tolerance for standing. Appropriate and Ongoing  - Pt able to perform HEP correctly with minimal cueing or supervision from therapist to encourage independent management of symptoms. Appropriate and Ongoing     Long term goals: 10 weeks or 20 visits   - Pt will demonstrate increased lumbar MedX ROM by at least 9 degrees from initial ROM value, resulting in improved ability to perform functional forward bending while standing and sitting. Appropriate and Ongoing  - Pt will demonstrate increased MedX average isometric strength value by 40% from initial test resulting in improved ability to perform bending, lifting, and carrying activities safely and confidently. Appropriate and Ongoing  - Pt to demonstrate ability to independently control and reduce their pain through posture positioning and mechanical movements throughout a typical day. Appropriate and Ongoing  - Pt will  demonstrate reduced pain and improved functional outcomes as reported on the FOTO by reaching an intake score of >/= 60% functional ability in order to demonstrate subjective improvement in patient's condition. . Appropriate and Ongoing  - Pt will demonstrate independence with the HEP at discharge. Appropriate and Ongoing  - Pt will be able to stand at least 30 minutes while doing household tasks. (patient goal) Appropriate and Ongoing      Plan   Continue with established Plan of Care towards established PT goals.       Therapist: Angelica Maldonado, PT  12/18/2023

## 2023-12-22 ENCOUNTER — LAB VISIT (OUTPATIENT)
Dept: LAB | Facility: HOSPITAL | Age: 73
End: 2023-12-22
Attending: INTERNAL MEDICINE
Payer: MEDICARE

## 2023-12-22 ENCOUNTER — CLINICAL SUPPORT (OUTPATIENT)
Dept: REHABILITATION | Facility: HOSPITAL | Age: 73
End: 2023-12-22
Payer: MEDICARE

## 2023-12-22 DIAGNOSIS — M25.69 DECREASED RANGE OF MOTION OF TRUNK AND BACK: Primary | ICD-10-CM

## 2023-12-22 DIAGNOSIS — R29.898 DECREASED STRENGTH OF TRUNK AND BACK: ICD-10-CM

## 2023-12-22 DIAGNOSIS — R29.3 POOR POSTURE: ICD-10-CM

## 2023-12-22 DIAGNOSIS — K74.69 OTHER CIRRHOSIS OF LIVER: ICD-10-CM

## 2023-12-22 LAB
AFP SERPL-MCNC: 7.2 NG/ML (ref 0–8.4)
ALBUMIN SERPL BCP-MCNC: 3.3 G/DL (ref 3.5–5.2)
ALP SERPL-CCNC: 107 U/L (ref 55–135)
ALT SERPL W/O P-5'-P-CCNC: 22 U/L (ref 10–44)
ANION GAP SERPL CALC-SCNC: 8 MMOL/L (ref 8–16)
AST SERPL-CCNC: 28 U/L (ref 10–40)
BASOPHILS # BLD AUTO: 0.05 K/UL (ref 0–0.2)
BASOPHILS NFR BLD: 0.5 % (ref 0–1.9)
BILIRUB DIRECT SERPL-MCNC: 0.2 MG/DL (ref 0.1–0.3)
BILIRUB SERPL-MCNC: 0.4 MG/DL (ref 0.1–1)
BUN SERPL-MCNC: 15 MG/DL (ref 8–23)
CALCIUM SERPL-MCNC: 9.9 MG/DL (ref 8.7–10.5)
CHLORIDE SERPL-SCNC: 105 MMOL/L (ref 95–110)
CO2 SERPL-SCNC: 27 MMOL/L (ref 23–29)
CREAT SERPL-MCNC: 0.9 MG/DL (ref 0.5–1.4)
DIFFERENTIAL METHOD: ABNORMAL
EOSINOPHIL # BLD AUTO: 0.4 K/UL (ref 0–0.5)
EOSINOPHIL NFR BLD: 4.2 % (ref 0–8)
ERYTHROCYTE [DISTWIDTH] IN BLOOD BY AUTOMATED COUNT: 13.9 % (ref 11.5–14.5)
EST. GFR  (NO RACE VARIABLE): >60 ML/MIN/1.73 M^2
GLUCOSE SERPL-MCNC: 152 MG/DL (ref 70–110)
HCT VFR BLD AUTO: 40.5 % (ref 37–48.5)
HGB BLD-MCNC: 13.3 G/DL (ref 12–16)
IMM GRANULOCYTES # BLD AUTO: 0.04 K/UL (ref 0–0.04)
IMM GRANULOCYTES NFR BLD AUTO: 0.4 % (ref 0–0.5)
INR PPP: 1 (ref 0.8–1.2)
LYMPHOCYTES # BLD AUTO: 2.4 K/UL (ref 1–4.8)
LYMPHOCYTES NFR BLD: 24.4 % (ref 18–48)
MCH RBC QN AUTO: 31.1 PG (ref 27–31)
MCHC RBC AUTO-ENTMCNC: 32.8 G/DL (ref 32–36)
MCV RBC AUTO: 95 FL (ref 82–98)
MONOCYTES # BLD AUTO: 1 K/UL (ref 0.3–1)
MONOCYTES NFR BLD: 10.2 % (ref 4–15)
NEUTROPHILS # BLD AUTO: 5.9 K/UL (ref 1.8–7.7)
NEUTROPHILS NFR BLD: 60.3 % (ref 38–73)
NRBC BLD-RTO: 0 /100 WBC
PLATELET # BLD AUTO: 252 K/UL (ref 150–450)
PMV BLD AUTO: 9.9 FL (ref 9.2–12.9)
POTASSIUM SERPL-SCNC: 4.3 MMOL/L (ref 3.5–5.1)
PROT SERPL-MCNC: 7.7 G/DL (ref 6–8.4)
PROTHROMBIN TIME: 10.8 SEC (ref 9–12.5)
RBC # BLD AUTO: 4.28 M/UL (ref 4–5.4)
SODIUM SERPL-SCNC: 140 MMOL/L (ref 136–145)
WBC # BLD AUTO: 9.75 K/UL (ref 3.9–12.7)

## 2023-12-22 PROCEDURE — 82105 ALPHA-FETOPROTEIN SERUM: CPT | Mod: HCNC | Performed by: INTERNAL MEDICINE

## 2023-12-22 PROCEDURE — 36415 COLL VENOUS BLD VENIPUNCTURE: CPT | Performed by: INTERNAL MEDICINE

## 2023-12-22 PROCEDURE — 97112 NEUROMUSCULAR REEDUCATION: CPT | Mod: CQ

## 2023-12-22 PROCEDURE — 97110 THERAPEUTIC EXERCISES: CPT | Mod: CQ

## 2023-12-22 PROCEDURE — 80053 COMPREHEN METABOLIC PANEL: CPT | Mod: HCNC | Performed by: INTERNAL MEDICINE

## 2023-12-22 PROCEDURE — 85610 PROTHROMBIN TIME: CPT | Mod: HCNC | Performed by: INTERNAL MEDICINE

## 2023-12-22 PROCEDURE — 82248 BILIRUBIN DIRECT: CPT | Mod: HCNC | Performed by: INTERNAL MEDICINE

## 2023-12-22 PROCEDURE — 85025 COMPLETE CBC W/AUTO DIFF WBC: CPT | Mod: HCNC | Performed by: INTERNAL MEDICINE

## 2023-12-22 NOTE — PROGRESS NOTES
[facility]  Physical Therapy Treatment Note     Name: Vivienne Jose  Clinic Number: [id number]    Therapy Diagnosis:   Encounter Diagnoses   Name Primary?    Decreased range of motion of trunk and back Yes    Decreased strength of trunk and back     Poor posture      Physician: Deja Feldman, *    Visit Date: 12/22/2023    Physician Orders: PT Eval and Treat  Medical Diagnosis from Referral: M47.816 (ICD-10-CM) - Spondylosis of lumbar region without myelopathy or radiculopathy   Evaluation Date: 12/7/2023  Authorization Period Expiration: 12/31/2024  Reassessment Due: 1/7/2024  Plan of Care Certification Period: 3/7/2024  Visit #/Visits authorized: 3/20   MedX Testing:MedX testing visit 2    Time In: 1:25 PM    Time Out: 2:20 PM  Total Billable Time: 55 minutes  INSURANCE and OUTCOMES: Fee for Service with FOTO Outcomes 1/3    Precautions: standard, diabetes, Hx of R TKA     Pattern of pain determined: 2    Subjective   Vivienne reports chronic lower back pain. States that she is still recovering from her (L) sided rib strain from eval testing. Patient admits to being out of shape and not used to exercising.    Patient reports tolerating previous visit ; Muscular soreness  Patient reports their pain to be 3/10 on a 0-10 scale with 0 being no pain and 10 being the worst pain imaginable.  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   Pt goals: improve standing and walking tolerance, walk for exercise     Objective      Lumbar  Isometric Testing on Med X equipment: Testing administered by PT    Test Initial Baseline Midpoint Final   Date 12/14/23     ROM 0-42 deg     Max Peak Torque 142      Min Peak Torque 71      Flex/Ext Ratio 2:1     % below normative data 9     % gain from initial test Not available visit 1     Pt appeared to be pushing through B LE during testing      Outcomes:  Intake Score: 51% functional  ability  Visit 6 Score:   Visit 10 Score:   Discharge Score:  Goal Score: 60% functional ability      Treatment     Vivienne received the treatments listed below:      Vivienne received neuromuscular education  to isolate and engage spinal stabilization musculature correctly for motor control and coordination to aid in function and posture for 15 minutes on the Medical Medx Machine.  Patient performed MedX dynamic exercise with emphasis on spinal muscular control using pacer throughout  active range of motion. Therapist assisted patient in achieving optimal exertion for neural reeducation and endurance training by using the  Naga Exertion Rating scale, by instructing the patient to aim for mid range of exertion, performing 15-20 repetitions, slowly, correctly,and safely.        12/22/2023     1:40 PM   [facility] Therapy - Short   Visit Number 3   VAS Pain Rating 3   Time 5   Lumbar Stretches - Slouch 10   Flexion in Lying 10   Lumbar Weight 55 lbs   Repetitions 15   Rating of Perceived Exertion 3      Vivienne participated in therapeutic exercises to develop strength, ROM, and core stabilization for 40  minutes including:    LTR x10  + DKTC  w/T-ball support x 10  Supine PPT 5 sec hold x10  Bridges + RTB x 10  +BKFO RTB x 10  SOC x10    Peripheral muscle strengthening which included one set of 15-20 repetitions at a slow and controlled 10-13 second per rep pace focused on strengthening supporting musculature in order to improve body mechanics and functional mobility. Patient and therapist focused on proper form during treatment to ensure optimal strengthening of each targeted muscle group.  Machines utilized included: Torso rotation, leg press, hip abd/add and leg extension  To be added next visit: Leg curls, bicep curls, tricep extension, chest press and rows.     Vivienne received manual therapy techniques for 00  minutes. The following activities were included:    Pt given cold pack for 5 minutes to low back     Patient  Education and Home Exercises     Home exercises include: PPT, bridgesmodesto   Cardio program (V5): -  Lifting education (V11): -  Posture/Lumbar roll:   Fridge Magnet Discharge handout (date given): -  Equipment at home/gym membership: [employer]    Education provided:   - Cues with exercises  - MedX performance  - Precor ex performance  - HB protocol  - Naga exertional scale.     Written Home Exercises Provided: Patient instructed to cont prior HEP.  Exercises were reviewed and Vivienne was able to demonstrate them prior to the end of the session.  Vivienne demonstrated fair understanding of the education provided.     See EMR under Patient Instructions for exercises provided prior visit.    Assessment   Vivienne returns for her third [facility] visit with chronic lower back pain which is minimal presently. Treatment continued with flexibility, strengthening and neuromuscular reeducation ex's. She was able to perform ex's with min cues and without increased pain. Lumbar MedX resistance was initiated at <50% Max peak as she apparently was using her LE's too much on test. MedX resistance initiated at 55 ft/lbs and she completed 15 reps with a RPE = 3/10. She requires cues to avoid use of LE's, MedX pacing and extension hold. She was able to complete 1/2 of peripheral strengthening ex's without increased back pain. Will plan on completing full circuit or peripheral circuit of peripheral strengthening ex's next visit.     Patient is making progress towards established goals.  Pt will continue to benefit from skilled outpatient physical therapy to address the deficits stated in the impairment chart, provide pt/family education and to maximize pt's level of independence in the home and community environment.     Anticipated Barriers for therapy: none  Pt's spiritual, cultural and educational needs considered and pt agreeable to plan of care and goals as stated below:     Goals:   Short term goals:  6 weeks or 10  visits   - Pt will demonstrate increased lumbar MedX ROM by at least 3 degrees from the initial ROM value with improvements noted in functional ROM and ability to perform ADLs. Appropriate and Ongoing  - Pt will demonstrate increased MedX average isometric strength value by 20% from initial test resulting in improved ability to perform bending, lifting, and carrying activities safely, confidently. Appropriate and Ongoing  - Pt will report a reduction in worst pain score by 2-3 points for improved tolerance for standing. Appropriate and Ongoing  - Pt able to perform HEP correctly with minimal cueing or supervision from therapist to encourage independent management of symptoms. Appropriate and Ongoing     Long term goals: 10 weeks or 20 visits   - Pt will demonstrate increased lumbar MedX ROM by at least 9 degrees from initial ROM value, resulting in improved ability to perform functional forward bending while standing and sitting. Appropriate and Ongoing  - Pt will demonstrate increased MedX average isometric strength value by 40% from initial test resulting in improved ability to perform bending, lifting, and carrying activities safely and confidently. Appropriate and Ongoing  - Pt to demonstrate ability to independently control and reduce their pain through posture positioning and mechanical movements throughout a typical day. Appropriate and Ongoing  - Pt will demonstrate reduced pain and improved functional outcomes as reported on the FOTO by reaching an intake score of >/= 60% functional ability in order to demonstrate subjective improvement in patient's condition. . Appropriate and Ongoing  - Pt will demonstrate independence with the HEP at discharge. Appropriate and Ongoing  - Pt will be able to stand at least 30 minutes while doing household tasks. (patient goal) Appropriate and Ongoing    Plan   Continue with established Plan of Care towards established PT goals.     Therapist: Luis Fernando Venegas  PTA  12/22/2023

## 2024-01-02 ENCOUNTER — HOSPITAL ENCOUNTER (OUTPATIENT)
Dept: RADIOLOGY | Facility: HOSPITAL | Age: 74
Discharge: HOME | End: 2024-01-02
Attending: INTERNAL MEDICINE
Payer: MEDICARE

## 2024-01-02 DIAGNOSIS — K74.69 OTHER CIRRHOSIS OF LIVER: ICD-10-CM

## 2024-01-02 PROCEDURE — 76700 US EXAM ABDOM COMPLETE: CPT | Mod: 26,,, | Performed by: RADIOLOGY

## 2024-01-02 PROCEDURE — 76700 US EXAM ABDOM COMPLETE: CPT | Mod: TC

## 2024-01-04 ENCOUNTER — CLINICAL SUPPORT (OUTPATIENT)
Dept: ALLERGY | Facility: CLINIC | Age: 74
End: 2024-01-04
Payer: MEDICARE

## 2024-01-04 DIAGNOSIS — J30.9 CHRONIC ALLERGIC RHINITIS: Primary | ICD-10-CM

## 2024-01-04 PROCEDURE — 95115 IMMUNOTHERAPY ONE INJECTION: CPT | Mod: HCNC,S$GLB,, | Performed by: STUDENT IN AN ORGANIZED HEALTH CARE EDUCATION/TRAINING PROGRAM

## 2024-01-05 ENCOUNTER — CLINICAL SUPPORT (OUTPATIENT)
Dept: REHABILITATION | Facility: HOSPITAL | Age: 74
End: 2024-01-05
Payer: MEDICARE

## 2024-01-05 DIAGNOSIS — R29.898 DECREASED STRENGTH OF TRUNK AND BACK: ICD-10-CM

## 2024-01-05 DIAGNOSIS — R29.3 POOR POSTURE: ICD-10-CM

## 2024-01-05 DIAGNOSIS — M25.69 DECREASED RANGE OF MOTION OF TRUNK AND BACK: Primary | ICD-10-CM

## 2024-01-05 PROCEDURE — 97110 THERAPEUTIC EXERCISES: CPT

## 2024-01-05 PROCEDURE — 97112 NEUROMUSCULAR REEDUCATION: CPT

## 2024-01-05 RX ORDER — FLUTICASONE PROPIONATE 50 MCG
2 SPRAY, SUSPENSION (ML) NASAL DAILY
Qty: 48 G | Refills: 2 | Status: SHIPPED | OUTPATIENT
Start: 2024-01-05

## 2024-01-05 NOTE — PROGRESS NOTES
[facility]  Physical Therapy Treatment Note     Name: Vivienne Jose  Clinic Number: [id number]    Therapy Diagnosis:   Encounter Diagnoses   Name Primary?    Decreased range of motion of trunk and back Yes    Decreased strength of trunk and back     Poor posture      Physician: Deja Feldman, *    Visit Date: 1/5/2024    Physician Orders: PT Eval and Treat  Medical Diagnosis from Referral: M47.816 (ICD-10-CM) - Spondylosis of lumbar region without myelopathy or radiculopathy   Evaluation Date: 12/7/2023  Authorization Period Expiration: 12/31/2024  Reassessment Due: 2/5/2024  Plan of Care Certification Period: 3/7/2024  Visit #/Visits authorized: 4/20   MedX Testing:MedX testing visit 2    Time In: 1:30 PM    Time Out: 2:40 PM  Total Billable Time: 65 minutes  INSURANCE and OUTCOMES: Fee for Service with FOTO Outcomes 1/3    Precautions: standard, diabetes, Hx of R TKA     Pattern of pain determined: 2    Subjective   Vivienne reports Left sided rib strain has resolved. She reports she has been able to tolerate standing a little longer while cooking over the last couple weeks. She reports no back pain currently, but she is feeling some Right hip discomfort.     Patient reports tolerating previous visit ; Muscular soreness  Patient reports their pain to be 0/10 on a 0-10 scale with 0 being no pain and 10 being the worst pain imaginable.  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   Pt goals: improve standing and walking tolerance, walk for exercise     Objective      MOVEMENT LOSS - Lumbar    Norms ROM Loss Initial ROM 1/5/24   Flexion Fingers touch toes, sacral angle >/= 70 deg, uniform spinal curvature, posterior weight shift  minimal loss Within functional limits   Extension ASIS surpasses toes, spine of scapulae surpasses heels, uniform spinal curve moderate loss moderate loss   Side glide Right   minimal  loss minimal loss   Side glide Left   minimal loss minimal loss   Rotation Right PT observes contralateral shoulder moderate loss minimal loss   Rotation Left PT observes contralateral shoulder moderate loss minimal loss      Lumbar  Isometric Testing on Med X equipment: Testing administered by PT    Test Initial Baseline Midpoint Final   Date 12/14/23     ROM 0-42 deg     Max Peak Torque 142      Min Peak Torque 71      Flex/Ext Ratio 2:1     % below normative data 9     % gain from initial test Not available visit 1     Pt pushing through B LE during testing    Outcomes:  Intake Score: 51% functional ability  Visit 6 Score:   Visit 10 Score:   Discharge Score:  Goal Score: 60% functional ability      Treatment     Vivienne received the treatments listed below:      Vivienne received neuromuscular education  to isolate and engage spinal stabilization musculature correctly for motor control and coordination to aid in function and posture for 15 minutes on the Medical [employer] Machine.  Patient performed MedX dynamic exercise with emphasis on spinal muscular control using pacer throughout  active range of motion. Therapist assisted patient in achieving optimal exertion for neural reeducation and endurance training by using the  Naga Exertion Rating scale, by instructing the patient to aim for mid range of exertion, performing 15-20 repetitions, slowly, correctly,and safely.        1/5/2024     1:52 PM   [facility]   Visit Number 4   VAS Pain Rating 0   Time 5   Lumbar Stretches - Slouch Overcorrection 10   Extension in Standing 10   Flexion in Lying 10   Lumbar Weight 55 lbs   Repetitions 20   Rating of Perceived Exertion 3   Ice - Sitting 5 mins.     Vivienne participated in therapeutic exercises to develop strength, ROM, and core stabilization for 40  minutes including:    LTR x10  DKTC  w/T-ball support x 10  Supine PPT 5 sec hold x15  BKFO RTB x10  Bridges +RTB x10  SOC x10  +EIS x10    Peripheral muscle  strengthening which included one set of 15-20 repetitions at a slow and controlled 10-13 second per rep pace focused on strengthening supporting musculature in order to improve body mechanics and functional mobility. Patient and therapist focused on proper form during treatment to ensure optimal strengthening of each targeted muscle group.  Machines utilized included: Torso rotation, leg press, hip abd/add and leg extension  To be added next visit: Leg curls, bicep curls, tricep extension, chest press and rows.     Vivienne received manual therapy techniques for 00  minutes. The following activities were included:    Pt given cold pack for 5 minutes to low back     Patient Education and Home Exercises   Home exercises include: PPT, bridgesmod[place]   Cardio program (V5): -  Lifting education (V11): -  Posture/Lumbar roll:   Fridge Magnet Discharge handout (date given): -  Equipment at home/gym membership: [employer]    Education provided:   - Cues with exercises  - MedX performance  - Precor ex performance  - HB protocol  - Naga exertional scale.     Written Home Exercises Provided: Patient instructed to cont prior HEP.  Exercises were reviewed and Vivienne was able to demonstrate them prior to the end of the session.  Vivienne demonstrated fair understanding of the education provided.     See EMR under Patient Instructions for exercises provided prior visit.    Assessment   Vivienne returns with no c/o back pain currently. Treatment continued with flexibility, strengthening and neuromuscular reeducation ex's. Added extension in lying which she tolerated well. She was able to perform ex's with min cues and without increased pain. Lumbar MedX resistance was maintained at 55 ft/lbs and she completed 20 reps with a RPE = 3/10. Increase by 5% next visit. She requires cues to avoid use of LE's, MedX pacing and extension hold. She was able to complete peripheral strengthening exercises without increased pain. Will continue to  progress treatment per HB protocol and pt's tolerance.     Patient is making progress towards established goals.  Pt will continue to benefit from skilled outpatient physical therapy to address the deficits stated in the impairment chart, provide pt/family education and to maximize pt's level of independence in the home and community environment.     Anticipated Barriers for therapy: none  Pt's spiritual, cultural and educational needs considered and pt agreeable to plan of care and goals as stated below:     Goals:   Short term goals:  6 weeks or 10 visits   - Pt will demonstrate increased lumbar MedX ROM by at least 3 degrees from the initial ROM value with improvements noted in functional ROM and ability to perform ADLs. Appropriate and Ongoing  - Pt will demonstrate increased MedX average isometric strength value by 20% from initial test resulting in improved ability to perform bending, lifting, and carrying activities safely, confidently. Appropriate and Ongoing  - Pt will report a reduction in worst pain score by 2-3 points for improved tolerance for standing. Appropriate and Ongoing  - Pt able to perform HEP correctly with minimal cueing or supervision from therapist to encourage independent management of symptoms. Appropriate and Ongoing     Long term goals: 10 weeks or 20 visits   - Pt will demonstrate increased lumbar MedX ROM by at least 9 degrees from initial ROM value, resulting in improved ability to perform functional forward bending while standing and sitting. Appropriate and Ongoing  - Pt will demonstrate increased MedX average isometric strength value by 40% from initial test resulting in improved ability to perform bending, lifting, and carrying activities safely and confidently. Appropriate and Ongoing  - Pt to demonstrate ability to independently control and reduce their pain through posture positioning and mechanical movements throughout a typical day. Appropriate and Ongoing  - Pt will  demonstrate reduced pain and improved functional outcomes as reported on the FOTO by reaching an intake score of >/= 60% functional ability in order to demonstrate subjective improvement in patient's condition. . Appropriate and Ongoing  - Pt will demonstrate independence with the HEP at discharge. Appropriate and Ongoing  - Pt will be able to stand at least 30 minutes while doing household tasks. (patient goal) Appropriate and Ongoing    Plan   Continue with established Plan of Care towards established PT goals.     Therapist: Angelica Maldonado, PT  1/5/2024

## 2024-01-09 ENCOUNTER — CLINICAL SUPPORT (OUTPATIENT)
Dept: REHABILITATION | Facility: HOSPITAL | Age: 74
End: 2024-01-09
Payer: MEDICARE

## 2024-01-09 ENCOUNTER — OFFICE VISIT (OUTPATIENT)
Dept: PODIATRY | Facility: CLINIC | Age: 74
End: 2024-01-09
Payer: MEDICARE

## 2024-01-09 VITALS
BODY MASS INDEX: 44.38 KG/M2 | OXYGEN SATURATION: 96 % | RESPIRATION RATE: 17 BRPM | HEIGHT: 62 IN | SYSTOLIC BLOOD PRESSURE: 117 MMHG | TEMPERATURE: 99 F | WEIGHT: 241.19 LBS | DIASTOLIC BLOOD PRESSURE: 74 MMHG | HEART RATE: 79 BPM

## 2024-01-09 DIAGNOSIS — M25.69 DECREASED RANGE OF MOTION OF TRUNK AND BACK: Primary | ICD-10-CM

## 2024-01-09 DIAGNOSIS — R29.3 POOR POSTURE: ICD-10-CM

## 2024-01-09 DIAGNOSIS — E11.42 TYPE 2 DIABETES MELLITUS WITH PERIPHERAL NEUROPATHY: Primary | ICD-10-CM

## 2024-01-09 DIAGNOSIS — R29.898 DECREASED STRENGTH OF TRUNK AND BACK: ICD-10-CM

## 2024-01-09 PROCEDURE — 1159F MED LIST DOCD IN RCRD: CPT | Mod: CPTII,S$GLB,, | Performed by: STUDENT IN AN ORGANIZED HEALTH CARE EDUCATION/TRAINING PROGRAM

## 2024-01-09 PROCEDURE — 3078F DIAST BP <80 MM HG: CPT | Mod: CPTII,S$GLB,, | Performed by: STUDENT IN AN ORGANIZED HEALTH CARE EDUCATION/TRAINING PROGRAM

## 2024-01-09 PROCEDURE — 3288F FALL RISK ASSESSMENT DOCD: CPT | Mod: CPTII,S$GLB,, | Performed by: STUDENT IN AN ORGANIZED HEALTH CARE EDUCATION/TRAINING PROGRAM

## 2024-01-09 PROCEDURE — 3074F SYST BP LT 130 MM HG: CPT | Mod: CPTII,S$GLB,, | Performed by: STUDENT IN AN ORGANIZED HEALTH CARE EDUCATION/TRAINING PROGRAM

## 2024-01-09 PROCEDURE — 3072F LOW RISK FOR RETINOPATHY: CPT | Mod: CPTII,S$GLB,, | Performed by: STUDENT IN AN ORGANIZED HEALTH CARE EDUCATION/TRAINING PROGRAM

## 2024-01-09 PROCEDURE — 97110 THERAPEUTIC EXERCISES: CPT | Mod: CQ

## 2024-01-09 PROCEDURE — 99999 PR PBB SHADOW E&M-EST. PATIENT-LVL V: CPT | Mod: PBBFAC,,, | Performed by: STUDENT IN AN ORGANIZED HEALTH CARE EDUCATION/TRAINING PROGRAM

## 2024-01-09 PROCEDURE — 1157F ADVNC CARE PLAN IN RCRD: CPT | Mod: CPTII,S$GLB,, | Performed by: STUDENT IN AN ORGANIZED HEALTH CARE EDUCATION/TRAINING PROGRAM

## 2024-01-09 PROCEDURE — 4010F ACE/ARB THERAPY RXD/TAKEN: CPT | Mod: CPTII,S$GLB,, | Performed by: STUDENT IN AN ORGANIZED HEALTH CARE EDUCATION/TRAINING PROGRAM

## 2024-01-09 PROCEDURE — 97112 NEUROMUSCULAR REEDUCATION: CPT | Mod: CQ

## 2024-01-09 PROCEDURE — 1101F PT FALLS ASSESS-DOCD LE1/YR: CPT | Mod: CPTII,S$GLB,, | Performed by: STUDENT IN AN ORGANIZED HEALTH CARE EDUCATION/TRAINING PROGRAM

## 2024-01-09 PROCEDURE — 99214 OFFICE O/P EST MOD 30 MIN: CPT | Mod: 25,S$GLB,, | Performed by: STUDENT IN AN ORGANIZED HEALTH CARE EDUCATION/TRAINING PROGRAM

## 2024-01-09 PROCEDURE — 1125F AMNT PAIN NOTED PAIN PRSNT: CPT | Mod: CPTII,S$GLB,, | Performed by: STUDENT IN AN ORGANIZED HEALTH CARE EDUCATION/TRAINING PROGRAM

## 2024-01-09 PROCEDURE — 11719 TRIM NAIL(S) ANY NUMBER: CPT | Mod: Q9,S$GLB,, | Performed by: STUDENT IN AN ORGANIZED HEALTH CARE EDUCATION/TRAINING PROGRAM

## 2024-01-09 PROCEDURE — 3008F BODY MASS INDEX DOCD: CPT | Mod: CPTII,S$GLB,, | Performed by: STUDENT IN AN ORGANIZED HEALTH CARE EDUCATION/TRAINING PROGRAM

## 2024-01-09 RX ORDER — GABAPENTIN 600 MG/1
600 TABLET ORAL 3 TIMES DAILY
Qty: 180 TABLET | Refills: 3 | Status: SHIPPED | OUTPATIENT
Start: 2024-01-09

## 2024-01-09 NOTE — PROCEDURES
"Routine Foot Care    Date/Time: 1/9/2024 2:15 PM    Performed by: Jose Pickett DPM  Authorized by: Jose Pickett DPM    Time out: Immediately prior to procedure a "time out" was called to verify the correct patient, procedure, equipment, support staff and site/side marked as required.    Consent Done?:  Yes (Verbal)  Hyperkeratotic Skin Lesions?: No      Nail Care Type:  Trim  Location(s): All  (Left 1st Toe, Left 3rd Toe, Left 2nd Toe, Left 4th Toe, Left 5th Toe, Right 1st Toe, Right 2nd Toe, Right 3rd Toe, Right 4th Toe and Right 5th Toe)  Patient tolerance:  Patient tolerated the procedure well with no immediate complications    "

## 2024-01-09 NOTE — PROGRESS NOTES
[facility]  Physical Therapy Treatment Note     Name: Vivienne Jose  Clinic Number: [id number]    Therapy Diagnosis:   Encounter Diagnoses   Name Primary?    Decreased range of motion of trunk and back Yes    Decreased strength of trunk and back     Poor posture      Physician: Deja Feldman, *    Visit Date: 1/9/2024    Physician Orders: PT Eval and Treat  Medical Diagnosis from Referral: M47.816 (ICD-10-CM) - Spondylosis of lumbar region without myelopathy or radiculopathy   Evaluation Date: 12/7/2023  Authorization Period Expiration: 12/31/2024  Reassessment Due: 2/5/2024  Plan of Care Certification Period: 3/7/2024  Visit #/Visits authorized: 5/20   MedX Testing:MedX testing visit 2    Time In: 7:50 AM  Time Out: 8:50 AM  Total Billable Time: 60 minutes  INSURANCE and OUTCOMES: Fee for Service with FOTO Outcomes 1/3    Precautions: standard, diabetes, Hx of R TKA     Pattern of pain determined: 2    Subjective   Vivienne reports  feeling achy all over due to the rainy weather yesterday and last night and also cleaning out her garage over the weekend    Patient reports tolerating previous visit ; Muscular soreness  Patient reports their pain to be 5/10 on a 0-10 scale with 0 being no pain and 10 being the worst pain imaginable.  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   Pt goals: improve standing and walking tolerance, walk for exercise     Objective      MOVEMENT LOSS - Lumbar    Norms ROM Loss Initial ROM 1/5/24   Flexion Fingers touch toes, sacral angle >/= 70 deg, uniform spinal curvature, posterior weight shift  minimal loss Within functional limits   Extension ASIS surpasses toes, spine of scapulae surpasses heels, uniform spinal curve moderate loss moderate loss   Side glide Right   minimal loss minimal loss   Side glide Left   minimal loss minimal loss   Rotation Right PT observes contralateral  shoulder moderate loss minimal loss   Rotation Left PT observes contralateral shoulder moderate loss minimal loss      Lumbar  Isometric Testing on Med X equipment: Testing administered by PT    Test Initial Baseline Midpoint Final   Date 12/14/23     ROM 0-42 deg     Max Peak Torque 142      Min Peak Torque 71      Flex/Ext Ratio 2:1     % below normative data 9     % gain from initial test Not available visit 1     Pt pushing through B LE during testing    Outcomes:  Intake Score: 51% functional ability  Visit 6 Score:   Visit 10 Score:   Discharge Score:  Goal Score: 60% functional ability      Treatment     Vivienne received the treatments listed below:      Vivienne received neuromuscular education  to isolate and engage spinal stabilization musculature correctly for motor control and coordination to aid in function and posture for 10 minutes on the Medical Medx Machine.  Patient performed MedX dynamic exercise with emphasis on spinal muscular control using pacer throughout  active range of motion. Therapist assisted patient in achieving optimal exertion for neural reeducation and endurance training by using the  [place]aga Exertion Rating scale, by instructing the patient to aim for mid range of exertion, performing 15-20 repetitions, slowly, correctly,and safely.        1/9/2024     8:07 AM   [facility] - Short   Visit Number 5   VAS Pain Rating 5   Time 5   Lumbar Stretches - Slouch 10   Extension in Standing 10   Flexion in Lying 10   Lumbar Weight 58 lbs   Repetitions 20   Rating of Perceived Exertion 3      Vivienne participated in therapeutic exercises to develop strength, ROM, and core stabilization for 50 minutes including:    LTR x10  DKTC  w/T-ball support x 10  Supine PPT 5 sec hold x10  BKFO GTB x10  Bridges +GTB x15  SOC x10  EIS x10    Peripheral muscle strengthening which included one set of 15-20 repetitions at a slow and controlled 10-13 second per rep pace focused on strengthening supporting  musculature in order to improve body mechanics and functional mobility. Patient and therapist focused on proper form during treatment to ensure optimal strengthening of each targeted muscle group.  Machines utilized included: Torso rotation, leg press, hip abd/add and leg extension Leg curls, bicep curls, tricep extension, chest press (Tband) and rows.     Vivienne received manual therapy techniques for 00  minutes. The following activities were included:    Pt given cold pack for 5 minutes to low back     Patient Education and Home Exercises   Home exercises include: PPT, [place] chak[employer]ort[employer]   Cardio program (V5): - 1/9/24  Lifting education (V11): -  Posture/Lumbar roll:   Fridge Magnet Discharge handout (date given): -  Equipment at home/gym membership: [employer]    Education provided:   - Cues with exercises  - MedX performance  - Precor ex performance.   - Benefits of cardio 1/9/24    Written Home Exercises Provided: Patient instructed to cont prior HEP.  Exercises were reviewed and Vivienne was able to demonstrate them prior to the end of the session.  Vivienne demonstrated fair understanding of the education provided.     See EMR under Patient Instructions for exercises provided prior visit.    Assessment   Vivienne returns with some generalized achiness which she attributes to inclement weather and cleaning out her garage over the weekend. Treatment continued with flexibility, strengthening and neuromuscular reeducation ex's. She was progressed to GTB for Bridging with band and BKFO. She was able to perform ex's with min cues and without increased pain. She was educated on the benefits of cardiovascular ex which she admits not doing much now but will try some walking. Lumbar MedX resistance was increased to 58 ft/lbs and she completed 20 reps with a RPE = 3/10.   She requires cues for MedX pacing, extension hold and to avoid use of LE's. She was able to complete peripheral strengthening exercises without increased  pain. Will continue to progress treatment per HB protocol and pt's tolerance. Pain level decreased to mild post session.    Patient is making progress towards established goals.  Pt will continue to benefit from skilled outpatient physical therapy to address the deficits stated in the impairment chart, provide pt/family education and to maximize pt's level of independence in the home and community environment.     Anticipated Barriers for therapy: none  Pt's spiritual, cultural and educational needs considered and pt agreeable to plan of care and goals as stated below:     Goals:   Short term goals:  6 weeks or 10 visits   - Pt will demonstrate increased lumbar MedX ROM by at least 3 degrees from the initial ROM value with improvements noted in functional ROM and ability to perform ADLs. Appropriate and Ongoing  - Pt will demonstrate increased MedX average isometric strength value by 20% from initial test resulting in improved ability to perform bending, lifting, and carrying activities safely, confidently. Appropriate and Ongoing  - Pt will report a reduction in worst pain score by 2-3 points for improved tolerance for standing. Appropriate and Ongoing  - Pt able to perform HEP correctly with minimal cueing or supervision from therapist to encourage independent management of symptoms. Appropriate and Ongoing     Long term goals: 10 weeks or 20 visits   - Pt will demonstrate increased lumbar MedX ROM by at least 9 degrees from initial ROM value, resulting in improved ability to perform functional forward bending while standing and sitting. Appropriate and Ongoing  - Pt will demonstrate increased MedX average isometric strength value by 40% from initial test resulting in improved ability to perform bending, lifting, and carrying activities safely and confidently. Appropriate and Ongoing  - Pt to demonstrate ability to independently control and reduce their pain through posture positioning and mechanical movements  throughout a typical day. Appropriate and Ongoing  - Pt will demonstrate reduced pain and improved functional outcomes as reported on the FOTO by reaching an intake score of >/= 60% functional ability in order to demonstrate subjective improvement in patient's condition. . Appropriate and Ongoing  - Pt will demonstrate independence with the HEP at discharge. Appropriate and Ongoing  - Pt will be able to stand at least 30 minutes while doing household tasks. (patient goal) Appropriate and Ongoing    Plan   Continue with established Plan of Care towards established PT goals.     Therapist: Luis Fernando Venegas, PTA  1/9/2024

## 2024-01-09 NOTE — PROGRESS NOTES
Subjective:     Patient    Vivienne Jose is a 73 y.o. female.    Problem    Previously followed by Rebecca Ohara and Abhay, last seen 1 year ago. Had both 1st toenails removed years ago due to ingrown/fungal nails, still has occasional spicules that bother her. Has sensitivity throughout her 1st toes and feet; has numbness, tingling, burning, shooting, cramping. On gabapentin 1200 mg/day which does improve her nerve pain and does not make her sleepy that she can tell; previously tried pregabalin which made her bones hurt. Sleepy at baseline.      Primary Care Provider    Primary Care Provider: Aislinn Magallon MD   Last Seen: 9/13/2023     History    History obtained from patient and review of medical records.     Past Medical History:   Diagnosis Date    Allergy     Angio-edema     Arthritis     Cataract     bilateral - not removed    Cerebrovascular malformation     Cirrhosis 11/24/2020    Colon polyps     Diabetes mellitus, type 2     Diabetic peripheral neuropathy     Edema of both feet 8/19/2022    GERD (gastroesophageal reflux disease)     Herpes infection     Hypothyroidism     Kidney stones     Mild nonproliferative diabetic retinopathy of 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 edema associated with type 2 diabetes mellitus 4/18/2019    DEL RIO (nonalcoholic steatohepatitis) 8/19/2022    Nausea & vomiting 11/23/2015    Obesity, morbid     Ovarian cyst     Seizure disorder, focal motor     Sleep apnea     Type II or unspecified type diabetes mellitus with neurological manifestations, uncontrolled(250.62)     Urticaria        Past Surgical History:   Procedure Laterality Date    CARPAL TUNNEL RELEASE Right 2014    CATARACT EXTRACTION W/  INTRAOCULAR LENS IMPLANT Right 9/19/2023    Procedure: EXTRACTION, CATARACT, WITH IOL INSERTION;  Surgeon: Lyndon Ortiz MD;  Location: [facility];  Service: Ophthalmology;  Laterality: Right;    CATARACT EXTRACTION W/  INTRAOCULAR LENS IMPLANT Left 10/3/2023    Procedure:  EXTRACTION, CATARACT, WITH IOL INSERTION;  Surgeon: Lyndon Ortiz MD;  [facility];  Service: Ophthalmology;  Laterality: Left;    COLONOSCOPY      COLONOSCOPY N/A 9/13/2017    Procedure: COLONOSCOPY Golytely;  Surgeon: Gisela Wall MD;  Location: [facility];  Service: Endoscopy;  Laterality: N/A;    COLONOSCOPY N/A 9/9/2022    Procedure: COLONOSCOPY Extended Suprep;  Surgeon: Britt Jasso MD;  Location: [facility] END[facility];  Service: Endoscopy;  Laterality: N/A;    CYST REMOVAL      skin; multiples    ESOPHAGOGASTRODUODENOSCOPY Left 10/15/2021    Procedure: EGD (ESOPHAGOGASTRODUODENOSCOPY);  Surgeon: Luis Fernando Taveras MD;  Location: [facility] (4TH FLR);  Service: Endoscopy;  Laterality: Left;  cirrhosis labwork am of procedure  last seizure 1973  COVID test on 10/12/21 at [facility]    ESOPHAGOGASTRODUODENOSCOPY N/A 3/10/2023    Procedure: EGD (ESOPHAGOGASTRODUODENOSCOPY);  Surgeon: Christa Caldera MD;  Location: [facility] (2ND FLR);  Service: Endoscopy;  Laterality: N/A;  Medically Urgent  cirrohsis-labs done on 2/9/23 (< 90 days)  New onset A-fib  3/1 instructions to portal-st  Precall complete- [place]    EXTRACORPOREAL SHOCK WAVE LITHOTRIPSY  2002    HYSTERECTOMY  1984    JOINT REPLACEMENT Right 03/06/2019    knee    KIDNEY STONE SURGERY      KNEE ARTHROPLASTY Right 3/6/2019    Procedure: ARTHROPLASTY, KNEE;  Surgeon: Pj Gamboa MD;  Location: [facility] OR;  Service: Orthopedics;  Laterality: Right;  Depuy (Stephen notified 2/21, CC)    THYROIDECTOMY  1977         TONSILLECTOMY, ADENOIDECTOMY      TRIGGER FINGER RELEASE      TUBAL LIGATION          Objective:     Vitals  Wt Readings from Last 1 Encounters:   01/09/24 109.4 kg (241 lb 2.9 oz)     Temp Readings from Last 1 Encounters:   01/09/24 98.9 °F (37.2 °C)     BP Readings from Last 1 Encounters:   01/09/24 117/74     Pulse Readings from Last 1 Encounters:   01/09/24 79       Dermatological Exam    Skin:  Pedal hair growth diminished and  Pedal skin thin and shiny on right  Pedal hair growth diminished and Pedal skin thin and shiny on left     Nails:  10 nail(s) elongated    Vascular Exam    Arteries:  Posterior tibial artery palpable on right  Dorsalis pedis artery palpable on right  Posterior tibial artery palpable on left  Dorsalis pedis artery palpable on left    Veins:  Superficial veins unremarkable on right  Superficial veins unremarkable on left    Swellin+ nonpitting on right  1+ nonpitting on left    Neurological Exam    [place] touch test:  6/6 sites sensed, light touch intact     Musculoskeletal Exam    Footwear:  Casual on right  Casual on left    Gait Exam:   Ambulatory Status: Ambulatory  Gait: Normal  Assistive Devices: None    Foot Progression Angle:  Normal on right  Normal on left     Right Lower Extremity Additional Findings:  Right foot and ankle function, strength, and range of motion unremarkable except as noted above.     Left Lower Extremity Additional Findings:  Left foot and ankle function, strength, and range of motion unremarkable except as noted above.    Imaging and Other Tests    Imaging:  Independently reviewed and interpreted imaging, findings are as follows: N/A    Other Tests: The following A1c results were reviewed.   Hemoglobin A1C   Date Value Ref Range Status   2023 6.7 (H) 4.0 - 5.6 % Final     Comment:     ADA Screening Guidelines:  5.7-6.4%  Consistent with prediabetes  >or=6.5%  Consistent with diabetes    High levels of fetal hemoglobin interfere with the HbA1C  assay. Heterozygous hemoglobin variants (HbS, HgC, etc)do  not significantly interfere with this assay.   However, presence of multiple variants may affect accuracy.     2023 6.3 (H) 4.0 - 5.6 % Final     Comment:     ADA Screening Guidelines:  5.7-6.4%  Consistent with prediabetes  >or=6.5%  Consistent with diabetes    High levels of fetal hemoglobin interfere with the HbA1C  assay. Heterozygous hemoglobin variants (HbS, HgC,  etc)do  not significantly interfere with this assay.   However, presence of multiple variants may affect accuracy.     08/04/2022 6.1 (H) 4.0 - 5.6 % Final     Comment:     ADA Screening Guidelines:  5.7-6.4%  Consistent with prediabetes  >or=6.5%  Consistent with diabetes    High levels of fetal hemoglobin interfere with the HbA1C  assay. Heterozygous hemoglobin variants (HbS, HgC, etc)do  not significantly interfere with this assay.   However, presence of multiple variants may affect accuracy.           Assessment:     Encounter Diagnosis   Name Primary?    Type 2 diabetes mellitus with peripheral neuropathy Yes        Plan:     I counseled the patient on her conditions, their implications and medical management.    Diabetic foot with peripheral neuropathy: chronic stable  -Diabetic foot exam performed.  -Performed shoe inspection and diabetic foot education. Reviewed importance of blood glucose control, proper nutrition, and foot hygiene to minimize risk of complications of diabetes. Recommended daily foot inspections, daily moisturizer to feet, avoiding sharp instruments to feet, appropriate footwear at all times when ambulating, and following up regularly for routine foot care.   -Diabetic foot risk: 2023 IWGDF very low ulcer risk.   -Next foot exam due January 2025.  -Increased gabapentin to 1800 mg/day.   -Nails trimmed x10, see procedure note.       Return to clinic in 1 month for nerve pain, call sooner PRN.

## 2024-01-10 NOTE — PROGRESS NOTES
Patient here for allergy injection.    0.5mL given SQ of 1:1 Red in Upper Left arm.  Tolerated well.     After 30 minutes of observation, patient noted to have a 2+ reaction of induration only at injection site.  No complaints voiced.               98.2

## 2024-01-11 ENCOUNTER — CLINICAL SUPPORT (OUTPATIENT)
Dept: REHABILITATION | Facility: HOSPITAL | Age: 74
End: 2024-01-11
Payer: MEDICARE

## 2024-01-11 DIAGNOSIS — M25.69 DECREASED RANGE OF MOTION OF TRUNK AND BACK: Primary | ICD-10-CM

## 2024-01-11 DIAGNOSIS — R29.3 POOR POSTURE: ICD-10-CM

## 2024-01-11 DIAGNOSIS — R29.898 DECREASED STRENGTH OF TRUNK AND BACK: ICD-10-CM

## 2024-01-11 PROCEDURE — 97112 NEUROMUSCULAR REEDUCATION: CPT | Mod: CQ

## 2024-01-11 PROCEDURE — 97110 THERAPEUTIC EXERCISES: CPT | Mod: CQ

## 2024-01-11 NOTE — PROGRESS NOTES
"[facility]  Physical Therapy Treatment Note     Name: Vivienne Jose  Clinic Number: [id number]    Therapy Diagnosis:   Encounter Diagnoses   Name Primary?    Decreased range of motion of trunk and back Yes    Decreased strength of trunk and back     Poor posture      Physician: Deja Feldman, *    Visit Date: 1/11/2024    Physician Orders: PT Eval and Treat  Medical Diagnosis from Referral: M47.816 (ICD-10-CM) - Spondylosis of lumbar region without myelopathy or radiculopathy   Evaluation Date: 12/7/2023  Authorization Period Expiration: 12/31/2024  Reassessment Due: 2/5/2024  Plan of Care Certification Period: 3/7/2024  Visit #/Visits authorized: 6/20   MedX Testing:MedX testing visit 2    Time In: 7:50 AM  Time Out:8:55 AM  Total Billable Time: 60 minutes  INSURANCE and OUTCOMES: Fee for Service with FOTO Outcomes 2/3     Precautions: standard, diabetes, Hx of R TKA     Pattern of pain determined: 2    Subjective   Vivienne reports  that she has a little " brain fog" due to recent change in her medication dosage. States that her back pain is only minimal today    Patient reports tolerating previous visit ; Muscular soreness  Patient reports their pain to be 3/10 on a 0-10 scale with 0 being no pain and 10 being the worst pain imaginable.  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   Pt goals: improve standing and walking tolerance, walk for exercise     Objective      MOVEMENT LOSS - Lumbar    Norms ROM Loss Initial ROM 1/5/24   Flexion Fingers touch toes, sacral angle >/= 70 deg, uniform spinal curvature, posterior weight shift  minimal loss Within functional limits   Extension ASIS surpasses toes, spine of scapulae surpasses heels, uniform spinal curve moderate loss moderate loss   Side glide Right   minimal loss minimal loss   Side glide Left   minimal loss minimal loss   Rotation Right PT observes " contralateral shoulder moderate loss minimal loss   Rotation Left PT observes contralateral shoulder moderate loss minimal loss      Lumbar  Isometric Testing on Med X equipment: Testing administered by PT    Test Initial Baseline Midpoint Final   Date 12/14/23     ROM 0-42 deg     Max Peak Torque 142      Min Peak Torque 71      Flex/Ext Ratio 2:1     % below normative data 9     % gain from initial test Not available visit 1     Pt pushing through B LE during testing    Outcomes:  Intake Score: 51% functional ability  Visit 6 Score: 53%  Visit 10 Score:   Discharge Score:  Goal Score: 60% functional ability          Treatment     Vivienne received the treatments listed below:      Vivienne received neuromuscular education  to isolate and engage spinal stabilization musculature correctly for motor control and coordination to aid in function and posture for 10 minutes on the Medical Medx Machine.  Patient performed MedX dynamic exercise with emphasis on spinal muscular control using pacer throughout  active range of motion. Therapist assisted patient in achieving optimal exertion for neural reeducation and endurance training by using the  Naga Exertion Rating scale, by instructing the patient to aim for mid range of exertion, performing 15-20 repetitions, slowly, correctly,and safely.        1/11/2024     7:52 AM   [facility] - Short   Visit Number 6   Lumbar Stretches - Slouch 10   Extension in Standing 10   Flexion in Lying 10   Lumbar Weight 62 lbs   Repetitions 18   Rating of Perceived Exertion 4       Vivienne participated in therapeutic exercises to develop strength, ROM, and core stabilization for 50 minutes including:    LTR x10  DKTC  w/T-ball support x 10  Supine PPT 5 sec hold x10-NP  +TrA activation (T-ball) + SLR x 10  BKFO GTB x10  Bridges +GTB x15  SOC x10  +seated thoracic extension w/1/2 foam roll x 10  EIS x10    Peripheral muscle strengthening which included one set of 15-20 repetitions at a slow  and controlled 10-13 second per rep pace focused on strengthening supporting musculature in order to improve body mechanics and functional mobility. Patient and therapist focused on proper form during treatment to ensure optimal strengthening of each targeted muscle group.  Machines utilized included: Torso rotation, leg press, hip abd/add and leg extension Leg curls, bicep curls, tricep extension, chest press (Tband) and rows.     Vivienne received manual therapy techniques for 00  minutes. The following activities were included:    Pt given cold pack for 5 minutes to low back     Patient Education and Home Exercises   Home exercises include: PPT, [place] chakraborty   Cardio program (V5): - 1/9/24  Lifting education (V11): -  Posture/Lumbar roll:   Fridge Magnet Discharge handout (date given): -  Equipment at home/gym membership: [employer]    Education provided:   - Cues with exercises  - MedX performance  - Precor ex performance.   - Benefits of cardio 1/9/24    Written Home Exercises Provided: Patient instructed to cont prior HEP.  Exercises were reviewed and Vivienne was able to demonstrate them prior to the end of the session.  Vivienne demonstrated fair understanding of the education provided.     See EMR under Patient Instructions for exercises provided prior visit.    Assessment   Vivienne returns  with minimal discomfort and some fatigue due to recent pain med change. Treatment continued with flexibility, strengthening and neuromuscular reeducation ex's. She was progressed to perform TrA activation + SLR for additional strengthening and seated thoracic ext stretch for mobility.. She was able to perform ex's with min cues and without increased pain. Lumbar MedX resistance was increased to 62 ft/lbs and she completed 18 reps with a RPE = 4/10.   She was able to complete peripheral strengthening exercises without increased pain. Will continue to progress treatment per HB protocol and pt's tolerance.  Updated FOTO  scoring reflects slight improvements    Patient is making progress towards established goals.  Pt will continue to benefit from skilled outpatient physical therapy to address the deficits stated in the impairment chart, provide pt/family education and to maximize pt's level of independence in the home and community environment.     Anticipated Barriers for therapy: none  Pt's spiritual, cultural and educational needs considered and pt agreeable to plan of care and goals as stated below:     Goals:   Short term goals:  6 weeks or 10 visits   - Pt will demonstrate increased lumbar MedX ROM by at least 3 degrees from the initial ROM value with improvements noted in functional ROM and ability to perform ADLs. Appropriate and Ongoing  - Pt will demonstrate increased MedX average isometric strength value by 20% from initial test resulting in improved ability to perform bending, lifting, and carrying activities safely, confidently. Appropriate and Ongoing  - Pt will report a reduction in worst pain score by 2-3 points for improved tolerance for standing. Appropriate and Ongoing  - Pt able to perform HEP correctly with minimal cueing or supervision from therapist to encourage independent management of symptoms. Appropriate and Ongoing     Long term goals: 10 weeks or 20 visits   - Pt will demonstrate increased lumbar MedX ROM by at least 9 degrees from initial ROM value, resulting in improved ability to perform functional forward bending while standing and sitting. Appropriate and Ongoing  - Pt will demonstrate increased MedX average isometric strength value by 40% from initial test resulting in improved ability to perform bending, lifting, and carrying activities safely and confidently. Appropriate and Ongoing  - Pt to demonstrate ability to independently control and reduce their pain through posture positioning and mechanical movements throughout a typical day. Appropriate and Ongoing  - Pt will demonstrate reduced pain  and improved functional outcomes as reported on the FOTO by reaching an intake score of >/= 60% functional ability in order to demonstrate subjective improvement in patient's condition. . Appropriate and Ongoing  - Pt will demonstrate independence with the HEP at discharge. Appropriate and Ongoing  - Pt will be able to stand at least 30 minutes while doing household tasks. (patient goal) Appropriate and Ongoing    Plan   Continue with established Plan of Care towards established PT goals.     Therapist: Luis Fernando Venegas, PTA  1/11/2024

## 2024-01-16 ENCOUNTER — CLINICAL SUPPORT (OUTPATIENT)
Dept: REHABILITATION | Facility: HOSPITAL | Age: 74
End: 2024-01-16
Payer: MEDICARE

## 2024-01-16 DIAGNOSIS — R29.3 POOR POSTURE: ICD-10-CM

## 2024-01-16 DIAGNOSIS — R29.898 DECREASED STRENGTH OF TRUNK AND BACK: ICD-10-CM

## 2024-01-16 DIAGNOSIS — M25.69 DECREASED RANGE OF MOTION OF TRUNK AND BACK: Primary | ICD-10-CM

## 2024-01-16 PROCEDURE — 97112 NEUROMUSCULAR REEDUCATION: CPT | Mod: CQ

## 2024-01-16 PROCEDURE — 97110 THERAPEUTIC EXERCISES: CPT | Mod: CQ

## 2024-01-16 NOTE — PROGRESS NOTES
[facility]  Physical Therapy Treatment Note     Name: Vivienne Jose  Clinic Number: [id number]    Therapy Diagnosis:   Encounter Diagnoses   Name Primary?    Decreased range of motion of trunk and back Yes    Decreased strength of trunk and back     Poor posture      Physician: Deja Feldman, *    Visit Date: 1/16/2024    Physician Orders: PT Eval and Treat  Medical Diagnosis from Referral: M47.816 (ICD-10-CM) - Spondylosis of lumbar region without myelopathy or radiculopathy   Evaluation Date: 12/7/2023  Authorization Period Expiration: 12/31/2024  Reassessment Due: 2/5/2024  Plan of Care Certification Period: 3/7/2024  Visit #/Visits authorized: 7/20   MedX Testing:MedX testing visit 2    Time In: 7:55 AM  Time Out:9:00 AM  Total Billable Time: 60 minutes ( 30 minutes 1 on 1)  INSURANCE and OUTCOMES: Fee for Service with FOTO Outcomes 2/3     Precautions: standard, diabetes, Hx of R TKA     Pattern of pain determined: 2    Subjective   Vivienne reports some increased stiffness with colder weather and minimal discomfort.    Patient reports tolerating previous visit ; Muscular soreness  Patient reports their pain to be 3/10 on a 0-10 scale with 0 being no pain and 10 being the worst pain imaginable.  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   Pt goals: improve standing and walking tolerance, walk for exercise     Objective      MOVEMENT LOSS - Lumbar    Norms ROM Loss Initial ROM 1/5/24   Flexion Fingers touch toes, sacral angle >/= 70 deg, uniform spinal curvature, posterior weight shift  minimal loss Within functional limits   Extension ASIS surpasses toes, spine of scapulae surpasses heels, uniform spinal curve moderate loss moderate loss   Side glide Right   minimal loss minimal loss   Side glide Left   minimal loss minimal loss   Rotation Right PT observes contralateral shoulder moderate loss minimal  loss   Rotation Left PT observes contralateral shoulder moderate loss minimal loss      Lumbar  Isometric Testing on Med X equipment: Testing administered by PT    Test Initial Baseline Midpoint Final   Date 12/14/23     ROM 0-42 deg     Max Peak Torque 142      Min Peak Torque 71      Flex/Ext Ratio 2:1     % below normative data 9     % gain from initial test Not available visit 1     Pt pushing through B LE during testing    Outcomes:  Intake Score: 51% functional ability  Visit 6 Score: 53%  Visit 10 Score:   Discharge Score:  Goal Score: 60% functional ability          Treatment     Vivienne received the treatments listed below:      Vivienne received neuromuscular education  to isolate and engage spinal stabilization musculature correctly for motor control and coordination to aid in function and posture for 10 minutes on the Medical Medx Machine.  Patient performed MedX dynamic exercise with emphasis on spinal muscular control using pacer throughout  active range of motion. Therapist assisted patient in achieving optimal exertion for neural reeducation and endurance training by using the  Naga Exertion Rating scale, by instructing the patient to aim for mid range of exertion, performing 15-20 repetitions, slowly, correctly,and safely.        1/16/2024     9:05 AM   [facility] - Short   Visit Number 7   VAS Pain Rating 3   Lumbar Stretches - Slouch 10   Extension in Standing 10   Flexion in Lying 10   Lumbar Weight 62 lbs   Repetitions 20   Rating of Perceived Exertion 4        Vivienne participated in therapeutic exercises to develop strength, ROM, and core stabilization for 50 minutes including:    LTR x10  DKTC  w/T-ball support x 10  Supine PPT 5 sec hold x10-NP  TrA activation (T-ball) + SLR 1# x 10  BKFO BTB x10  Bridges +BTB x15  SOC x10  seated thoracic extension w/1/2 foam roll x 10  EIS x10    Peripheral muscle strengthening which included one set of 15-20 repetitions at a slow and controlled 10-13  second per rep pace focused on strengthening supporting musculature in order to improve body mechanics and functional mobility. Patient and therapist focused on proper form during treatment to ensure optimal strengthening of each targeted muscle group.  Machines utilized included: Torso rotation, leg press, hip abd/add and leg extension Leg curls, bicep curls, tricep extension, chest press (Tband) and rows.     Vivienne received manual therapy techniques for 00  minutes. The following activities were included:    Pt given cold pack for 5 minutes to low back     Patient Education and Home Exercises   Home exercises include: PPT, [place] chakraborty   Cardio program (V5): - 1/9/24  Lifting education (V11): -  Posture/Lumbar roll:   Fridge Magnet Discharge handout (date given): -  Equipment at home/gym membership: [employer]    Education provided:   - Cues with exercises  - MedX performance  - Precor ex performance.   - Benefits of cardio 1/9/24    Written Home Exercises Provided: Patient instructed to cont prior HEP.  Exercises were reviewed and Vivienne was able to demonstrate them prior to the end of the session.  Vivienne demonstrated fair understanding of the education provided.     See EMR under Patient Instructions for exercises provided prior visit.    Assessment   Vivienne returns  with minimal discomfort/stiffness with the colder weather. Treatment continued with flexibility, strengthening and neuromuscular reeducation ex's. Added 1# resistance for TrA activation + SLR and BTB for bridging with band and BKFO. She was able to perform ex's with min cues and without increased pain. Lumbar MedX resistance was maintained at 62 ft/lbs and she completed 20 reps with a RPE = 4/10. She was able to complete peripheral strengthening exercises without increased pain. Will continue to progress treatment per HB protocol and pt's tolerance.      Patient is making progress towards established goals.  Pt will continue to benefit  from skilled outpatient physical therapy to address the deficits stated in the impairment chart, provide pt/family education and to maximize pt's level of independence in the home and community environment.     Anticipated Barriers for therapy: none  Pt's spiritual, cultural and educational needs considered and pt agreeable to plan of care and goals as stated below:     Goals:   Short term goals:  6 weeks or 10 visits   - Pt will demonstrate increased lumbar MedX ROM by at least 3 degrees from the initial ROM value with improvements noted in functional ROM and ability to perform ADLs. Appropriate and Ongoing  - Pt will demonstrate increased MedX average isometric strength value by 20% from initial test resulting in improved ability to perform bending, lifting, and carrying activities safely, confidently. Appropriate and Ongoing  - Pt will report a reduction in worst pain score by 2-3 points for improved tolerance for standing. Appropriate and Ongoing  - Pt able to perform HEP correctly with minimal cueing or supervision from therapist to encourage independent management of symptoms. Appropriate and Ongoing     Long term goals: 10 weeks or 20 visits   - Pt will demonstrate increased lumbar MedX ROM by at least 9 degrees from initial ROM value, resulting in improved ability to perform functional forward bending while standing and sitting. Appropriate and Ongoing  - Pt will demonstrate increased MedX average isometric strength value by 40% from initial test resulting in improved ability to perform bending, lifting, and carrying activities safely and confidently. Appropriate and Ongoing  - Pt to demonstrate ability to independently control and reduce their pain through posture positioning and mechanical movements throughout a typical day. Appropriate and Ongoing  - Pt will demonstrate reduced pain and improved functional outcomes as reported on the FOTO by reaching an intake score of >/= 60% functional ability in order  to demonstrate subjective improvement in patient's condition. . Appropriate and Ongoing  - Pt will demonstrate independence with the HEP at discharge. Appropriate and Ongoing  - Pt will be able to stand at least 30 minutes while doing household tasks. (patient goal) Appropriate and Ongoing    Plan   Continue with established Plan of Care towards established PT goals.     Therapist: Luis Fernando Venegas, PTA  1/16/2024

## 2024-01-18 ENCOUNTER — CLINICAL SUPPORT (OUTPATIENT)
Dept: REHABILITATION | Facility: HOSPITAL | Age: 74
End: 2024-01-18
Payer: MEDICARE

## 2024-01-18 DIAGNOSIS — R29.3 POOR POSTURE: ICD-10-CM

## 2024-01-18 DIAGNOSIS — R29.898 DECREASED STRENGTH OF TRUNK AND BACK: ICD-10-CM

## 2024-01-18 DIAGNOSIS — M25.69 DECREASED RANGE OF MOTION OF TRUNK AND BACK: Primary | ICD-10-CM

## 2024-01-18 PROCEDURE — 97112 NEUROMUSCULAR REEDUCATION: CPT | Mod: CQ

## 2024-01-18 PROCEDURE — 97110 THERAPEUTIC EXERCISES: CPT | Mod: CQ

## 2024-01-18 NOTE — PROGRESS NOTES
[facility]  Physical Therapy Treatment Note     Name: Vivienne Jose  Clinic Number: [id number]    Therapy Diagnosis:   Encounter Diagnoses   Name Primary?    Decreased range of motion of trunk and back Yes    Decreased strength of trunk and back     Poor posture      Physician: Deja Feldman, *    Visit Date: 1/18/2024    Physician Orders: PT Eval and Treat  Medical Diagnosis from Referral: M47.816 (ICD-10-CM) - Spondylosis of lumbar region without myelopathy or radiculopathy   Evaluation Date: 12/7/2023  Authorization Period Expiration: 12/31/2024  Reassessment Due: 2/5/2024  Plan of Care Certification Period: 3/7/2024  Visit #/Visits authorized: 8/20   MedX Testing:MedX testing visit 2    Time In: 9:00 AM  Time Out: 10:05 AM  Total Billable Time: 60 minutes    INSURANCE and OUTCOMES: Fee for Service with FOTO Outcomes 2/3     Precautions: standard, diabetes, Hx of R TKA     Pattern of pain determined: 2    Subjective   Vivienne reports minimal discomfort. She reports onset of some increased hip soreness which she possibly attributes to the Precor hip adductor machine.     Patient reports tolerating previous visit ; Muscular soreness  Patient reports their pain to be 3/10 on a 0-10 scale with 0 being no pain and 10 being the worst pain imaginable.  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   Pt goals: improve standing and walking tolerance, walk for exercise     Objective      MOVEMENT LOSS - Lumbar    Norms ROM Loss Initial ROM 1/5/24   Flexion Fingers touch toes, sacral angle >/= 70 deg, uniform spinal curvature, posterior weight shift  minimal loss Within functional limits   Extension ASIS surpasses toes, spine of scapulae surpasses heels, uniform spinal curve moderate loss moderate loss   Side glide Right   minimal loss minimal loss   Side glide Left   minimal loss minimal loss   Rotation Right PT  observes contralateral shoulder moderate loss minimal loss   Rotation Left PT observes contralateral shoulder moderate loss minimal loss      Lumbar  Isometric Testing on Med X equipment: Testing administered by PT    Test Initial Baseline Midpoint Final   Date 12/14/23     ROM 0-42 deg     Max Peak Torque 142      Min Peak Torque 71      Flex/Ext Ratio 2:1     % below normative data 9     % gain from initial test Not available visit 1     Pt pushing through B LE during testing    Outcomes:  Intake Score: 51% functional ability  Visit 6 Score: 53%  Visit 10 Score:   Discharge Score:  Goal Score: 60% functional ability          Treatment     Vivienne received the treatments listed below:      Vivienne received neuromuscular education  to isolate and engage spinal stabilization musculature correctly for motor control and coordination to aid in function and posture for 10 minutes on the Medical Medx Machine.  Patient performed MedX dynamic exercise with emphasis on spinal muscular control using pacer throughout  active range of motion. Therapist assisted patient in achieving optimal exertion for neural reeducation and endurance training by using the  Naga Exertion Rating scale, by instructing the patient to aim for mid range of exertion, performing 15-20 repetitions, slowly, correctly,and safely.        1/18/2024     9:41 AM   [facility] - Short   Visit Number 8   Time 5   Lumbar Stretches - Slouch 10   Extension in Standing 10   Flexion in Lying 10   Lumbar Weight 65 lbs   Repetitions 20   Rating of Perceived Exertion 3         Vivienne participated in therapeutic exercises to develop strength, ROM, and core stabilization for 50 minutes including:    LTR x10  +Hip ER stretch 5 x 10 sec  DKTC  w/T-ball support x 10  Supine PPT 5 sec hold x10-NP  TrA activation (T-ball) + SLR 1# x 12  BKFO BTB x10  Bridges +BTB x15  SOC x10  seated thoracic extension w/1/2 foam roll x 10  EIS x10    Peripheral muscle strengthening which  included one set of 15-20 repetitions at a slow and controlled 10-13 second per rep pace focused on strengthening supporting musculature in order to improve body mechanics and functional mobility. Patient and therapist focused on proper form during treatment to ensure optimal strengthening of each targeted muscle group.  Machines utilized included: Torso rotation, leg press, hip abd/add and leg extension Leg curls, bicep curls, tricep extension, chest press (Tband) and rows.     Vivienne received manual therapy techniques for 00  minutes. The following activities were included:    Pt given cold pack for 5 minutes to low back     Patient Education and Home Exercises   Home exercises include: PPT, bridges, clamshells   Cardio program (V5): - 1/9/24  Lifting education (V11): -  Posture/Lumbar roll:   Fridge Magnet Discharge handout (date given): -  Equipment at home/gym membership: [employer]    Education provided:   - Cues with exercises  - MedX performance  - Precor ex performance.   - Benefits of cardio 1/9/24    Written Home Exercises Provided: Patient instructed to cont prior HEP.  Exercises were reviewed and Vivienne was able to demonstrate them prior to the end of the session.  Vivienne demonstrated fair understanding of the education provided.     See EMR under Patient Instructions for exercises provided prior visit.    Assessment   Vivienne returns with minimal back and hip discomfort/stiffness  Treatment continued with flexibility, strengthening and neuromuscular reeducation ex's.  Added Hip ER stretch to address some hip discomfort/stiffness and she was also progressed with reps for TrA activation + SLR.. She was able to perform ex's with min cues and without increased pain. Lumbar MedX resistance was increased to 65 ft/lbs and she completed 20 reps with a RPE = 3/10. She was able to complete peripheral strengthening exercises without increased pain (decreased resistance on Hip add machine today due to previous  discomfort and this was better tolerated).  Will continue to progress treatment per HB protocol and pt's tolerance.      Patient is making progress towards established goals.  Pt will continue to benefit from skilled outpatient physical therapy to address the deficits stated in the impairment chart, provide pt/family education and to maximize pt's level of independence in the home and community environment.     Anticipated Barriers for therapy: none  Pt's spiritual, cultural and educational needs considered and pt agreeable to plan of care and goals as stated below:     Goals:   Short term goals:  6 weeks or 10 visits   - Pt will demonstrate increased lumbar MedX ROM by at least 3 degrees from the initial ROM value with improvements noted in functional ROM and ability to perform ADLs. Appropriate and Ongoing  - Pt will demonstrate increased MedX average isometric strength value by 20% from initial test resulting in improved ability to perform bending, lifting, and carrying activities safely, confidently. Appropriate and Ongoing  - Pt will report a reduction in worst pain score by 2-3 points for improved tolerance for standing. Appropriate and Ongoing  - Pt able to perform HEP correctly with minimal cueing or supervision from therapist to encourage independent management of symptoms. Appropriate and Ongoing     Long term goals: 10 weeks or 20 visits   - Pt will demonstrate increased lumbar MedX ROM by at least 9 degrees from initial ROM value, resulting in improved ability to perform functional forward bending while standing and sitting. Appropriate and Ongoing  - Pt will demonstrate increased MedX average isometric strength value by 40% from initial test resulting in improved ability to perform bending, lifting, and carrying activities safely and confidently. Appropriate and Ongoing  - Pt to demonstrate ability to independently control and reduce their pain through posture positioning and mechanical movements  throughout a typical day. Appropriate and Ongoing  - Pt will demonstrate reduced pain and improved functional outcomes as reported on the FOTO by reaching an intake score of >/= 60% functional ability in order to demonstrate subjective improvement in patient's condition. . Appropriate and Ongoing  - Pt will demonstrate independence with the HEP at discharge. Appropriate and Ongoing  - Pt will be able to stand at least 30 minutes while doing household tasks. (patient goal) Appropriate and Ongoing    Plan   Continue with established Plan of Care towards established PT goals.     Therapist: Luis Fernando Venegas, PTA  1/18/2024

## 2024-01-22 ENCOUNTER — OFFICE VISIT (OUTPATIENT)
Dept: DERMATOLOGY | Facility: CLINIC | Age: 74
End: 2024-01-22
Payer: MEDICARE

## 2024-01-22 DIAGNOSIS — D23.9 DERMATOFIBROMA: ICD-10-CM

## 2024-01-22 DIAGNOSIS — L30.4 INTERTRIGO: ICD-10-CM

## 2024-01-22 DIAGNOSIS — D36.10 NEUROFIBROMA: ICD-10-CM

## 2024-01-22 DIAGNOSIS — H61.009 CHONDRODERMATITIS NODULARIS HELICIS, UNSPECIFIED LATERALITY: ICD-10-CM

## 2024-01-22 DIAGNOSIS — Z12.83 SCREENING EXAM FOR SKIN CANCER: ICD-10-CM

## 2024-01-22 DIAGNOSIS — D22.9 MULTIPLE BENIGN NEVI: ICD-10-CM

## 2024-01-22 DIAGNOSIS — D18.01 CHERRY ANGIOMA: ICD-10-CM

## 2024-01-22 DIAGNOSIS — L82.0 SEBORRHEIC KERATOSES, INFLAMED: Primary | ICD-10-CM

## 2024-01-22 DIAGNOSIS — L81.4 LENTIGO: ICD-10-CM

## 2024-01-22 DIAGNOSIS — L82.1 SEBORRHEIC KERATOSES: ICD-10-CM

## 2024-01-22 PROCEDURE — 1159F MED LIST DOCD IN RCRD: CPT | Mod: HCNC,CPTII,S$GLB, | Performed by: STUDENT IN AN ORGANIZED HEALTH CARE EDUCATION/TRAINING PROGRAM

## 2024-01-22 PROCEDURE — 1101F PT FALLS ASSESS-DOCD LE1/YR: CPT | Mod: HCNC,CPTII,S$GLB, | Performed by: STUDENT IN AN ORGANIZED HEALTH CARE EDUCATION/TRAINING PROGRAM

## 2024-01-22 PROCEDURE — 17110 DESTRUCTION B9 LES UP TO 14: CPT | Mod: HCNC,S$GLB,, | Performed by: STUDENT IN AN ORGANIZED HEALTH CARE EDUCATION/TRAINING PROGRAM

## 2024-01-22 PROCEDURE — 4010F ACE/ARB THERAPY RXD/TAKEN: CPT | Mod: HCNC,CPTII,S$GLB, | Performed by: STUDENT IN AN ORGANIZED HEALTH CARE EDUCATION/TRAINING PROGRAM

## 2024-01-22 PROCEDURE — 3072F LOW RISK FOR RETINOPATHY: CPT | Mod: HCNC,CPTII,S$GLB, | Performed by: STUDENT IN AN ORGANIZED HEALTH CARE EDUCATION/TRAINING PROGRAM

## 2024-01-22 PROCEDURE — 1126F AMNT PAIN NOTED NONE PRSNT: CPT | Mod: HCNC,CPTII,S$GLB, | Performed by: STUDENT IN AN ORGANIZED HEALTH CARE EDUCATION/TRAINING PROGRAM

## 2024-01-22 PROCEDURE — 99204 OFFICE O/P NEW MOD 45 MIN: CPT | Mod: 25,HCNC,S$GLB, | Performed by: STUDENT IN AN ORGANIZED HEALTH CARE EDUCATION/TRAINING PROGRAM

## 2024-01-22 PROCEDURE — 99999 PR PBB SHADOW E&M-EST. PATIENT-LVL IV: CPT | Mod: PBBFAC,HCNC,, | Performed by: STUDENT IN AN ORGANIZED HEALTH CARE EDUCATION/TRAINING PROGRAM

## 2024-01-22 PROCEDURE — 1157F ADVNC CARE PLAN IN RCRD: CPT | Mod: HCNC,CPTII,S$GLB, | Performed by: STUDENT IN AN ORGANIZED HEALTH CARE EDUCATION/TRAINING PROGRAM

## 2024-01-22 PROCEDURE — 3288F FALL RISK ASSESSMENT DOCD: CPT | Mod: HCNC,CPTII,S$GLB, | Performed by: STUDENT IN AN ORGANIZED HEALTH CARE EDUCATION/TRAINING PROGRAM

## 2024-01-22 PROCEDURE — 1160F RVW MEDS BY RX/DR IN RCRD: CPT | Mod: HCNC,CPTII,S$GLB, | Performed by: STUDENT IN AN ORGANIZED HEALTH CARE EDUCATION/TRAINING PROGRAM

## 2024-01-22 RX ORDER — BETAMETHASONE DIPROPIONATE 0.5 MG/G
OINTMENT TOPICAL 2 TIMES DAILY
Qty: 15 G | Refills: 2 | Status: SHIPPED | OUTPATIENT
Start: 2024-01-22

## 2024-01-22 NOTE — PROGRESS NOTES
Subjective:      Patient ID:  Vivienne Jose is a 73 y.o. female who presents for   Chief Complaint   Patient presents with    Skin Check     TBSE     History of Present Illness: The patient presents for TBSE.     No h/o NMSC or MM.     Patient with new complaint of lesion(s)  Location: R neck  Duration: 1 year  Symptoms: none  Relieving factors/Previous treatments: none     Patient with new complaint of lesion(s)  Location: L wrist  Duration: 1+ year  Symptoms: none  Relieving factors/Previous treatments: none                Review of Systems   Skin:  Negative for daily sunscreen use, activity-related sunscreen use, recent sunburn and wears hat.   Hematologic/Lymphatic: Bruises/bleeds easily.       Objective:   Physical Exam   Constitutional: She appears well-developed and well-nourished. No distress.   Neurological: She is alert and oriented to person, place, and time. She is not disoriented.   Psychiatric: She has a normal mood and affect.   Skin:   Areas Examined (abnormalities noted in diagram):   Scalp / Hair Palpated and Inspected  Head / Face Inspection Performed  Neck Inspection Performed  Chest / Axilla Inspection Performed  Abdomen Inspection Performed  Genitals / Buttocks / Groin Inspection Performed  Back Inspection Performed  RUE Inspected  LUE Inspection Performed  RLE Inspected  LLE Inspection Performed  Nails and Digits Inspection Performed                 Diagram Legend     Erythematous scaling macule/papule c/w actinic keratosis       Vascular papule c/w angioma      Pigmented verrucoid papule/plaque c/w seborrheic keratosis      Yellow umbilicated papule c/w sebaceous hyperplasia      Irregularly shaped tan macule c/w lentigo     1-2 mm smooth white papules consistent with Milia      Movable subcutaneous cyst with punctum c/w epidermal inclusion cyst      Subcutaneous movable cyst c/w pilar cyst      Firm pink to brown papule c/w dermatofibroma      Pedunculated fleshy papule(s) c/w skin tag(s)       Evenly pigmented macule c/w junctional nevus     Mildly variegated pigmented, slightly irregular-bordered macule c/w mildly atypical nevus      Flesh colored to evenly pigmented papule c/w intradermal nevus       Pink pearly papule/plaque c/w basal cell carcinoma      Erythematous hyperkeratotic cursted plaque c/w SCC      Surgical scar with no sign of skin cancer recurrence      Open and closed comedones      Inflammatory papules and pustules      Verrucoid papule consistent consistent with wart     Erythematous eczematous patches and plaques     Dystrophic onycholytic nail with subungual debris c/w onychomycosis     Umbilicated papule    Erythematous-base heme-crusted tan verrucoid plaque consistent with inflamed seborrheic keratosis     Erythematous Silvery Scaling Plaque c/w Psoriasis     See annotation      Assessment / Plan:        Seborrheic keratoses, inflamed  - lesions were itchy    Cryosurgery procedure note:    Verbal consent from the patient is obtained including, but not limited to, risk of hypopigmentation/hyperpigmentation, scar, recurrence of lesion. Liquid nitrogen cryosurgery is applied to 3 lesions to produce a freeze injury. The patient is aware that blisters may form and is instructed on wound care with gentle cleansing and use of vaseline ointment to keep moist until healed. The patient is supplied a handout on cryosurgery and is instructed to call if lesions do not completely resolve.    Chondrodermatitis nodularis helicis, unspecified laterality  - counseled that etiology is pressure from sleeping. Can try sleeping on other side vs using donut pillow. Offered TCS vs ILK. Patient preferred topical  -     betamethasone dipropionate (DIPROLENE) 0.05 % ointment; Apply topically 2 (two) times daily. Use to affected areas for up to 2 weeks then take a 1 week break or decrease to 3 times weekly. Do not apply to groin or face. Use to spot on ear when flaring  Dispense: 15 g; Refill:  2    Seborrheic keratoses  Multiple benign nevi  Cherry angioma  Lentigo  Dermatofibroma  Neurofibroma  Reassurance given to patient. No treatment is necessary.   Treatment of benign, asymptomatic lesions may be considered cosmetic.    Screening exam for skin cancer  Total body skin examination performed today including at least 12 points as noted in physical examination. No lesions suspicious for malignancy noted.    Recommend daily sun protection/avoidance, use of at least SPF 30, broad spectrum sunscreen (OTC drug), skin self examinations, and routine physician surveillance to optimize early detection    Intertrigo    Intertrigo  - Daily: Wash affected area with water and gentle non-soap cleanser (eg, Cetaphil gentle cleanser). Ensure all cleanser is rinsed off prior to getting out shower/bath. Pat dry. Then proceed to treatment (when rash present) or maintenance as below.   - Treatment (when rash/irritation present): Apply hydrocortisone and ketoconazole mixture 2x per day, once after showering.  Ensure full dryness (eg, cool blow dry, sprinkle OTC Zeasorb AF powder).   - Maintenance (when no rash/irritation present): Cool blow dry after showering. Apply OTC Zeasorb AF powder as needed.               Follow up in about 1 year (around 1/22/2025) for TBSE.     minor surgery planned

## 2024-01-22 NOTE — PATIENT INSTRUCTIONS
CRYOSURGERY      Your doctor has used a method called cryosurgery to treat your skin condition. Cryosurgery refers to the use of very cold substances to treat a variety of skin conditions such as warts, pre-skin cancers, molluscum contagiosum, sun spots, and several benign growths. The substance we use in cryosurgery is liquid nitrogen and is so cold (-195 degrees Celsius) that is burns when administered.     Following treatment in the office, the skin may immediately burn and become red. You may find the area around the lesion is affected as well. It is sometimes necessary to treat not only the lesion, but a small area of the surrounding normal skin to achieve a good response.     A blister, and even a blood filled blister, may form after treatment.   This is a normal response. If the blister is painful, it is acceptable to sterilize a needle and with rubbing alcohol and gently pop the blister. It is important that you gently wash the area with soap and warm water as the blister fluid may contain wart virus if a wart was treated. Do no remove the roof of the blister.     The area treated can take anywhere from 1-3 weeks to heal. Healing time depends on the kind skin lesion treated, the location, and how aggressively the lesion was treated. It is recommended that the areas treated are covered with Vaseline or bacitracin ointment and a band-aid. If a band-aid is not practical, just ointment applied several times per day will do. Keeping these areas moist will speed the healing time.    Treatment with liquid nitrogen can leave a scar. In dark skin, it may be a light or dark scar, in light skin it may be a white or pink scar. These will generally fade with time, but may never go away completely.     If you have any concerns after your treatment, please feel free to call the office.       [address] [place] [address]/ [phone] FAX/ [email or web address]         INTERTRIGO  Intertrigo is a  rash in the body folds, such as groin, armpits, under breasts, etc.   Intertrigo can have many causes but most often it is worse in the heat of summer when body folds become damp and irritated, potentially leading to overgrowth of yeast or bacteria.   To prevent intertrigo:    After showering, lift the breasts or other folds and use a blow dryer on a the Cool setting to dry   There is a product you can purchase online, Sarah Towels that have been designed to elevate the breasts to help them dry. [email or web address].com/  After application of prescription creams, use barrier cream such as Vaseline or zinc oxide containing cream (eg, Desitin) to protect the skin from irritation throughout the day  If you prefer absorbent powders, then you can apply zeasorb AF powder  Depending on how your rash appears, Dr. Anglin may prescribe certain creams.   If the rash does not resolve, you may need to undergo a biopsy for further diagnosis.

## 2024-01-23 ENCOUNTER — CLINICAL SUPPORT (OUTPATIENT)
Dept: REHABILITATION | Facility: HOSPITAL | Age: 74
End: 2024-01-23
Payer: MEDICARE

## 2024-01-23 DIAGNOSIS — R29.898 DECREASED STRENGTH OF TRUNK AND BACK: ICD-10-CM

## 2024-01-23 DIAGNOSIS — R29.3 POOR POSTURE: ICD-10-CM

## 2024-01-23 DIAGNOSIS — M25.69 DECREASED RANGE OF MOTION OF TRUNK AND BACK: Primary | ICD-10-CM

## 2024-01-23 PROCEDURE — 97110 THERAPEUTIC EXERCISES: CPT | Mod: CQ

## 2024-01-23 PROCEDURE — 97112 NEUROMUSCULAR REEDUCATION: CPT | Mod: CQ

## 2024-01-23 NOTE — PROGRESS NOTES
[facility]  Physical Therapy Treatment Note     Name: Vivienne Jose  Clinic Number: [id number]    Therapy Diagnosis:   Encounter Diagnoses   Name Primary?    Decreased range of motion of trunk and back Yes    Decreased strength of trunk and back     Poor posture      Physician: Deja Feldman, *    Visit Date: 1/23/2024    Physician Orders: PT Eval and Treat  Medical Diagnosis from Referral: M47.816 (ICD-10-CM) - Spondylosis of lumbar region without myelopathy or radiculopathy   Evaluation Date: 12/7/2023  Authorization Period Expiration: 12/31/2024  Reassessment Due: 2/5/2024  Plan of Care Certification Period: 3/7/2024  Visit #/Visits authorized: 9/20   MedX Testing:MedX testing visit 2    Time In: 7:45 AM  Time Out:  8:50 AM  Total Billable Time:30 minutes (1 on1)   INSURANCE and OUTCOMES: Fee for Service with FOTO Outcomes 2/3     Precautions: standard, diabetes, Hx of R TKA     Pattern of pain determined: 2    Subjective   Vivienne reports minimal discomfort lower back discomfort. States that she had some bad news yesterday in that her daughter has a mass in her intestines and will require surgery.    Patient reports tolerating previous visit ; Muscular soreness  Patient reports their pain to be 3/10 on a 0-10 scale with 0 being no pain and 10 being the worst pain imaginable.  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   Pt goals: improve standing and walking tolerance, walk for exercise     Objective      MOVEMENT LOSS - Lumbar    Norms ROM Loss Initial ROM 1/5/24   Flexion Fingers touch toes, sacral angle >/= 70 deg, uniform spinal curvature, posterior weight shift  minimal loss Within functional limits   Extension ASIS surpasses toes, spine of scapulae surpasses heels, uniform spinal curve moderate loss moderate loss   Side glide Right   minimal loss minimal loss   Side glide Left   minimal loss minimal  loss   Rotation Right PT observes contralateral shoulder moderate loss minimal loss   Rotation Left PT observes contralateral shoulder moderate loss minimal loss      Lumbar  Isometric Testing on Med X equipment: Testing administered by PT    Test Initial Baseline Midpoint Final   Date 12/14/23     ROM 0-42 deg     Max Peak Torque 142      Min Peak Torque 71      Flex/Ext Ratio 2:1     % below normative data 9     % gain from initial test Not available visit 1     Pt pushing through B LE during testing    Outcomes:  Intake Score: 51% functional ability  Visit 6 Score: 53%  Visit 10 Score:   Discharge Score:  Goal Score: 60% functional ability          Treatment     Vivienne received the treatments listed below:      Vivienne received neuromuscular education  to isolate and engage spinal stabilization musculature correctly for motor control and coordination to aid in function and posture for 10 minutes on the Medical Medx Machine.  Patient performed MedX dynamic exercise with emphasis on spinal muscular control using pacer throughout  active range of motion. Therapist assisted patient in achieving optimal exertion for neural reeducation and endurance training by using the  Naga Exertion Rating scale, by instructing the patient to aim for mid range of exertion, performing 15-20 repetitions, slowly, correctly,and safely.        1/23/2024     7:49 AM   [facility] - Short   Visit Number 9   Treadmill Time (in min.) 5 min   Lumbar Stretches - Slouch 10   Extension in Standing 10   Flexion in Lying 10   Lumbar Weight 69 lbs   Repetitions 15   Rating of Perceived Exertion 3          Vivienne participated in therapeutic exercises to develop strength, ROM, and core stabilization for 50 minutes including:    LTR x10  Hip ER stretch 5 x 10 sec  DKTC  w/T-ball support x 10  Supine PPT 5 sec hold x10-NP  TrA activation (T-ball) + SLR 1# x 15  BKFO BTB x10  Bridges +BTB x15  SOC x10  seated thoracic extension w/1/2 foam roll x  10  EIS x10    Peripheral muscle strengthening which included one set of 15-20 repetitions at a slow and controlled 10-13 second per rep pace focused on strengthening supporting musculature in order to improve body mechanics and functional mobility. Patient and therapist focused on proper form during treatment to ensure optimal strengthening of each targeted muscle group.  Machines utilized included: Torso rotation, leg press, hip abd/add and leg extension Leg curls, bicep curls, tricep extension, chest press (Tband) and rows.     Vivienne received manual therapy techniques for 00  minutes. The following activities were included:    Pt given cold pack for 5 minutes to low back     Patient Education and Home Exercises   Home exercises include: PPT, bridgesmod[place]   Cardio program (V5): - 1/9/24  Lifting education (V11): -  Posture/Lumbar roll:   Fridge Magnet Discharge handout (date given): -  Equipment at home/gym membership: [employer]    Education provided:   - Cues with exercises  - MedX performance  - Precor ex performance.   - Benefits of cardio 1/9/24    Written Home Exercises Provided: Patient instructed to cont prior HEP.  Exercises were reviewed and Vivienne was able to demonstrate them prior to the end of the session.  Vivienne demonstrated fair understanding of the education provided.     See EMR under Patient Instructions for exercises provided prior visit.    Assessment   Vivienne returns with minimal back and hip discomfort/stiffness  Treatment continued with flexibility, strengthening and neuromuscular reeducation ex's.  She was also progressed with reps for TrA activation + SLR.. She was able to perform warm-up on TM today vs nu-step without c/o. She was able to perform ex's with min cues and without increased pain. Lumbar MedX resistance was increased to 69 ft/lbs and she completed 15 reps with a RPE = 3/10. She was able to complete peripheral strengthening exercises without increased pain.  Will  continue to progress treatment per HB protocol and pt's tolerance.      Patient is making progress towards established goals.  Pt will continue to benefit from skilled outpatient physical therapy to address the deficits stated in the impairment chart, provide pt/family education and to maximize pt's level of independence in the home and community environment.     Anticipated Barriers for therapy: none  Pt's spiritual, cultural and educational needs considered and pt agreeable to plan of care and goals as stated below:     Goals:   Short term goals:  6 weeks or 10 visits   - Pt will demonstrate increased lumbar MedX ROM by at least 3 degrees from the initial ROM value with improvements noted in functional ROM and ability to perform ADLs. Appropriate and Ongoing  - Pt will demonstrate increased MedX average isometric strength value by 20% from initial test resulting in improved ability to perform bending, lifting, and carrying activities safely, confidently. Appropriate and Ongoing  - Pt will report a reduction in worst pain score by 2-3 points for improved tolerance for standing. Appropriate and Ongoing  - Pt able to perform HEP correctly with minimal cueing or supervision from therapist to encourage independent management of symptoms. Appropriate and Ongoing     Long term goals: 10 weeks or 20 visits   - Pt will demonstrate increased lumbar MedX ROM by at least 9 degrees from initial ROM value, resulting in improved ability to perform functional forward bending while standing and sitting. Appropriate and Ongoing  - Pt will demonstrate increased MedX average isometric strength value by 40% from initial test resulting in improved ability to perform bending, lifting, and carrying activities safely and confidently. Appropriate and Ongoing  - Pt to demonstrate ability to independently control and reduce their pain through posture positioning and mechanical movements throughout a typical day. Appropriate and Ongoing  - Pt  will demonstrate reduced pain and improved functional outcomes as reported on the FOTO by reaching an intake score of >/= 60% functional ability in order to demonstrate subjective improvement in patient's condition. . Appropriate and Ongoing  - Pt will demonstrate independence with the HEP at discharge. Appropriate and Ongoing  - Pt will be able to stand at least 30 minutes while doing household tasks. (patient goal) Appropriate and Ongoing    Plan   Continue with established Plan of Care towards established PT goals.     Therapist: Luis Fernando Venegas, PTA  1/23/2024

## 2024-01-25 ENCOUNTER — CLINICAL SUPPORT (OUTPATIENT)
Dept: REHABILITATION | Facility: HOSPITAL | Age: 74
End: 2024-01-25
Payer: MEDICARE

## 2024-01-25 DIAGNOSIS — R29.3 POOR POSTURE: ICD-10-CM

## 2024-01-25 DIAGNOSIS — R29.898 DECREASED STRENGTH OF TRUNK AND BACK: ICD-10-CM

## 2024-01-25 DIAGNOSIS — M25.69 DECREASED RANGE OF MOTION OF TRUNK AND BACK: Primary | ICD-10-CM

## 2024-01-25 PROCEDURE — 97112 NEUROMUSCULAR REEDUCATION: CPT | Performed by: PHYSICAL MEDICINE & REHABILITATION

## 2024-01-25 PROCEDURE — 97110 THERAPEUTIC EXERCISES: CPT | Performed by: PHYSICAL MEDICINE & REHABILITATION

## 2024-01-25 NOTE — PROGRESS NOTES
[facility]  Physical Therapy Treatment Note     Name: Vivienne Jose  Clinic Number: [id number]    Therapy Diagnosis:   Encounter Diagnoses   Name Primary?    Decreased range of motion of trunk and back Yes    Decreased strength of trunk and back     Poor posture        Physician: Deja Feldman, *    Visit Date: 1/25/2024    Physician Orders: PT Eval and Treat  Medical Diagnosis from Referral: M47.816 (ICD-10-CM) - Spondylosis of lumbar region without myelopathy or radiculopathy   Evaluation Date: 12/7/2023  Authorization Period Expiration: 12/31/2024  Reassessment Due: 2/25/2024  Plan of Care Certification Period: 3/7/2024  Visit #/Visits authorized: 10/20 Increased extension hold on medx 1 sec based on testing results to assist with extension strengthening for next visit  MedX Testing:MedX testing visit 2    Time In: 8:30  AM  Time Out:  9:42 AM  Total Billable Time: 48 minutes (1 on1)   INSURANCE and OUTCOMES: Fee for Service with FOTO Outcomes 2/3     Precautions: standard, diabetes, Hx of R TKA     Pattern of pain determined: 2    Subjective   Vivienne reports minimal discomfort lower back discomfort. States she has trouble if she sits long time and she sits at home and work.  She does use pillow under her.  We discussed purple cushion for support, and pillow behind her with lumbar roll as she reports she sits forward in the chair.  Encouraged her to bring pictures of her sitting for us to see for further suggestions.  She is trying to stretch.  She reports she uses mindful coloring to help her get to sleep but she wakes to go to the bathroom and then sometimes she is too awake to go back to sleep.  We did share mindful video codes for relaxation for her to try to see if this is helpful for relaxing.  She has added stress of ill parent she cares for and her 's memory is also beginning to be a problem.    Patient reports tolerating previous visit ; Muscular  soreness  Patient reports their pain to be 3/10 on a 0-10 scale with 0 being no pain and 10 being the worst pain imaginable.  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   Pt goals: improve standing and walking tolerance, walk for exercise     Objective      MOVEMENT LOSS - Lumbar    Norms ROM Loss Initial ROM 1/25/24   Flexion Fingers touch toes, sacral angle >/= 70 deg, uniform spinal curvature, posterior weight shift  minimal loss Within functional limits   Extension ASIS surpasses toes, spine of scapulae surpasses heels, uniform spinal curve moderate loss moderate loss   Side glide Right   minimal loss minimal loss   Side glide Left   minimal loss minimal loss   Rotation Right PT observes contralateral shoulder moderate loss minimal loss   Rotation Left PT observes contralateral shoulder moderate loss minimal loss      Lumbar  Isometric Testing on Med X equipment: Testing administered by PT    Test Initial Baseline Midpoint Final   Date 12/14/23 1/25/24    ROM 0-42 deg 0-48    Max Peak Torque 142  203    Min Peak Torque 71  78    Flex/Ext Ratio 2:1 2.3/1    % below normative data 9 48% above    % gain from initial test Not available visit 1 27% gain    Pt pushing through B LE during testing initial      Outcomes:  Intake Score: 51% functional ability  Visit 6 Score: 53%  Visit 10 Score: 53%  Discharge Score:  Goal Score: 60% functional ability          Treatment     Vivienne received the treatments listed below:      Vivienne received neuromuscular education  to isolate and engage spinal stabilization musculature correctly for motor control and coordination to aid in function and posture for 10 minutes on the Medical Medx Machine.  Patient performed MedX dynamic exercise with emphasis on spinal muscular control using pacer throughout  active range of motion. Therapist assisted patient in achieving optimal exertion for neural reeducation and endurance training  by using the  Naga Exertion Rating scale, by instructing the patient to aim for mid range of exertion, performing 15-20 repetitions, slowly, correctly,and safely.        1/25/2024     9:22 AM   [facility]   Visit Number 10   VAS Pain Rating 0   Time 5   Lumbar Stretches - Slouch Overcorrection 10   Extension in Standing 10   Flexion in Lying 10   Lumbar Flexion 48   Lumbar Extension 0   Lumbar Peak Torque 203 ft. lbs.   Min Torque 78   Test Percent Gain in Strength from Initial  27 %   Ice - Sitting 5 mins.           Vivienne participated in therapeutic exercises to develop strength, ROM, and core stabilization for 50 minutes including:    LTR x10  Hip ER stretch 5 x 10 sec  DKTC  w/T-ball support x 10  Supine PPT 5 sec hold x10-NP  TrA activation (T-ball) + SLR 1# x 15  BKFO BTB x10  Bridges +BTB x15  SOC x10  seated thoracic extension w/1/2 foam roll x 10  EIS x10      Peripheral muscle strengthening which included one set of 15-20 repetitions at a slow and controlled 10-13 second per rep pace focused on strengthening supporting musculature in order to improve body mechanics and functional mobility. Patient and therapist focused on proper form during treatment to ensure optimal strengthening of each targeted muscle group.  Machines utilized included: Torso rotation, leg press, hip abd/add and leg extension Leg curls, bicep curls, tricep extension, chest press (Tband) and rows.     Vivienne received manual therapy techniques for 00  minutes. The following activities were included:    Pt given cold pack for 5 minutes to low back     Patient Education and Home Exercises   Home exercises include: PPT, [place]   Cardio program (V5): - 1/9/24  Lifting education (V11): -  Posture/Lumbar roll: discussed chair ergonomics at length 1/25/24 and suggested pictures brought in  Fridge Magnet Discharge handout (date given): -  Equipment at home/gym membership: [employer]    Education provided:   - Cues with  exercises  - MedX performance  - Precor ex performance.   - Benefits of cardio 1/9/24 - reviewed  -medx testing, safe procedure and only giving best safe effort that is comfortable, results shared with patient    Written Home Exercises Provided: Patient instructed to cont prior HEP.  Exercises were reviewed and Vivienne was able to demonstrate them prior to the end of the session.  Vivienne demonstrated fair understanding of the education provided.     See EMR under Patient Instructions for exercises provided prior visit.    Assessment   Patient has attended 10 visits at [facility] which included MD evaluation, PT evaluation with isometric testing, and physical therapy treatment including HEP instruction, education, aerobic activity, dynamic strengthening on MedX equipment for the spine, and whole body strengthening on MedX equipment with increasing resistance. Patient demonstrates increased ability to reduce symptoms, improve posture, improve ROM, and improve strength, as stated below:    -Improved posture, she was encouraged to bring in pictures of sitting for further guidance   -Improved lumbar ROM, initially on MedX test 0-42 and currently 0-48.  -Improved strength at each test point on lumbar MedX isometric test with 27% average improvement noted with reduced pain noted by patient.  -Initial outcome tool score 51% function and current outcome tool score 53% function indicating reduced pain and improved function.    Increased extension hold on medx 1 sec based on testing results to assist with extension strengthening.   Vivienne returns with minimal back and hip discomfort/stiffness  Treatment continued with flexibility, strengthening and neuromuscular reeducation ex's.  She was also progressed with blue band for bridging and BKFO.   Tolerated test well.  Performed bike today for preference.   She was able to perform ex's with min cues and without increased pain. Lumbar MedX med x test tolerated well.   She was  able to complete peripheral strengthening exercises without increased pain.  Will continue to progress treatment per HB protocol and pt's tolerance.      Patient is making progress towards established goals.  Pt will continue to benefit from skilled outpatient physical therapy to address the deficits stated in the impairment chart, provide pt/family education and to maximize pt's level of independence in the home and community environment.     Anticipated Barriers for therapy: none  Pt's spiritual, cultural and educational needs considered and pt agreeable to plan of care and goals as stated below:     Goals:   Short term goals:  6 weeks or 10 visits   - Pt will demonstrate increased lumbar MedX ROM by at least 3 degrees from the initial ROM value with improvements noted in functional ROM and ability to perform ADLs. (MET 1/25/24)  - Pt will demonstrate increased MedX average isometric strength value by 20% from initial test resulting in improved ability to perform bending, lifting, and carrying activities safely, confidently. (MET 1/25/24)  - Pt will report a reduction in worst pain score by 2-3 points for improved tolerance for standing. Appropriate and Ongoing  - Pt able to perform HEP correctly with minimal cueing or supervision from therapist to encourage independent management of symptoms. Appropriate and Ongoing     Long term goals: 10 weeks or 20 visits   - Pt will demonstrate increased lumbar MedX ROM by at least 9 degrees from initial ROM value, resulting in improved ability to perform functional forward bending while standing and sitting. Appropriate and Ongoing  - Pt will demonstrate increased MedX average isometric strength value by 40% from initial test resulting in improved ability to perform bending, lifting, and carrying activities safely and confidently. Appropriate and Ongoing  - Pt to demonstrate ability to independently control and reduce their pain through posture positioning and mechanical  movements throughout a typical day. Appropriate and Ongoing  - Pt will demonstrate reduced pain and improved functional outcomes as reported on the FOTO by reaching an intake score of >/= 60% functional ability in order to demonstrate subjective improvement in patient's condition. . Appropriate and Ongoing  - Pt will demonstrate independence with the HEP at discharge. Appropriate and Ongoing  - Pt will be able to stand at least 30 minutes while doing household tasks. (patient goal) Appropriate and Ongoing    Plan   Continue with established Plan of Care towards established PT goals.     Therapist: Sweetie Leon, PT  1/25/2024

## 2024-01-30 ENCOUNTER — CLINICAL SUPPORT (OUTPATIENT)
Dept: REHABILITATION | Facility: HOSPITAL | Age: 74
End: 2024-01-30
Payer: MEDICARE

## 2024-01-30 DIAGNOSIS — M25.69 DECREASED RANGE OF MOTION OF TRUNK AND BACK: Primary | ICD-10-CM

## 2024-01-30 DIAGNOSIS — R29.3 POOR POSTURE: ICD-10-CM

## 2024-01-30 DIAGNOSIS — R29.898 DECREASED STRENGTH OF TRUNK AND BACK: ICD-10-CM

## 2024-01-30 PROCEDURE — 97530 THERAPEUTIC ACTIVITIES: CPT | Performed by: PHYSICAL MEDICINE & REHABILITATION

## 2024-01-30 PROCEDURE — 97110 THERAPEUTIC EXERCISES: CPT | Performed by: PHYSICAL MEDICINE & REHABILITATION

## 2024-01-30 PROCEDURE — 97112 NEUROMUSCULAR REEDUCATION: CPT | Performed by: PHYSICAL MEDICINE & REHABILITATION

## 2024-01-30 NOTE — PROGRESS NOTES
[facility]  Physical Therapy Treatment Note     Name: Vivienne Jose  Clinic Number: [id number]    Therapy Diagnosis:   Encounter Diagnoses   Name Primary?    Decreased range of motion of trunk and back Yes    Decreased strength of trunk and back     Poor posture          Physician: Deja Feldman, *    Visit Date: 1/30/2024    Physician Orders: PT Eval and Treat  Medical Diagnosis from Referral: M47.816 (ICD-10-CM) - Spondylosis of lumbar region without myelopathy or radiculopathy   Evaluation Date: 12/7/2023  Authorization Period Expiration: 12/31/2024  Reassessment Due: 2/29/2024  Plan of Care Certification Period: 3/7/2024  Visit #/Visits authorized: 11/20 Increased extension hold on medx 1 sec after testing visit 10 to assist with extension strengthening   Testing:MedX testing visit 2    Time In: 8:32  AM  Time Out:  9:52 AM  Total Billable Time: 48 minutes (1 on1)   INSURANCE and OUTCOMES: Fee for Service with FOTO Outcomes 2/3     Precautions: standard, diabetes, Hx of R TKA     Pattern of pain determined: 2    Subjective   Vivienne reports minimal discomfort lower back discomfort. States she has trouble if she sits long time and she sits at home and work.  She does use pillow under her.  We discussed purple cushion for support, and pillow behind her with lumbar roll as she reports she sits forward in the chair.  Encouraged her to bring pictures of her sitting for us to see for further suggestions.  She is trying to stretch. She has added stress of ill parent she cares for and her 's memory is also beginning to be a problem.    Patient reports tolerating previous visit ; Muscular soreness  Patient reports their pain to be 3/10 on a 0-10 scale with 0 being no pain and 10 being the worst pain imaginable.  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   Pt goals: improve standing and walking tolerance,  walk for exercise     Objective      MOVEMENT LOSS - Lumbar    Norms ROM Loss Initial ROM 1/30/24   Flexion Fingers touch toes, sacral angle >/= 70 deg, uniform spinal curvature, posterior weight shift  minimal loss Within functional limits   Extension ASIS surpasses toes, spine of scapulae surpasses heels, uniform spinal curve moderate loss moderate loss   Side glide Right   minimal loss minimal loss   Side glide Left   minimal loss minimal loss   Rotation Right PT observes contralateral shoulder moderate loss minimal loss   Rotation Left PT observes contralateral shoulder moderate loss minimal loss      Lumbar  Isometric Testing on Med X equipment: Testing administered by PT    Test Initial Baseline Midpoint Final   Date 12/14/23 1/25/24    ROM 0-42 deg 0-48    Max Peak Torque 142  203    Min Peak Torque 71  78    Flex/Ext Ratio 2:1 2.3/1    % below normative data 9 48% above    % gain from initial test Not available visit 1 27% gain    Pt pushing through B LE during testing initial      Outcomes:  Intake Score: 51% functional ability  Visit 6 Score: 53%  Visit 10 Score: 53%  Discharge Score:  Goal Score: 60% functional ability          Treatment     Vivienne received the treatments listed below:      Vivienne received neuromuscular education  to isolate and engage spinal stabilization musculature correctly for motor control and coordination to aid in function and posture for 10 minutes on the Medical Medx Machine.  Patient performed MedX dynamic exercise with emphasis on spinal muscular control using pacer throughout  active range of motion. Therapist assisted patient in achieving optimal exertion for neural reeducation and endurance training by using the  [place] Exertion Rating scale, by instructing the patient to aim for mid range of exertion, performing 15-20 repetitions, slowly, correctly,and safely.          1/30/2024     9:48 AM   [facility] Therapy   Visit Number 11   VAS Pain Rating 0   Time 5   Lumbar Stretches  - Slouch Overcorrection 10   Extension in Standing 10   Flexion in Lying 10   Lumbar Weight 69 lbs   Repetitions 18   Rating of Perceived Exertion 4   Ice - Sitting 5 mins.        Vivienne participated in therapeutic exercises to develop strength, ROM, and core stabilization for 48 minutes including:    LTR x10  Hip ER stretch 5 x 10 sec  DKTC  w/T-ball support x 10  Supine PPT 5 sec hold x10-NP  TrA activation (T-ball) + SLR 2# x 15  BKFO BTB x20  Bridges +BTB x20  SOC x10  seated thoracic extension w/1/2 foam roll x 10  EIS x10      Peripheral muscle strengthening which included one set of 15-20 repetitions at a slow and controlled 10-13 second per rep pace focused on strengthening supporting musculature in order to improve body mechanics and functional mobility. Patient and therapist focused on proper form during treatment to ensure optimal strengthening of each targeted muscle group.  Machines utilized included: Torso rotation, leg press, hip abd/add and leg extension Leg curls, bicep curls, tricep extension, chest press (Tband) and rows.     Therapeutic activity for 15 min to assist with lifting, grocery shopping, house work focusing on:  Floor to waist lift (5 lbs) considerable cuing for body mech  Hip hinge 10  Golfer's lift 10  Some soreness after lifting, dissipated with stretching in sitting with ball.  Discussed stretching after lifts at home to manage symptoms, or altering how she is lifting    Vivienne received manual therapy techniques for 00  minutes. The following activities were included:    Pt given cold pack for 5 minutes to low back     Patient Education and Home Exercises   Home exercises include: PPT, bridges, clamshells, seated flexion   Cardio program (V5): - 1/9/24  Lifting education (V11): -  1/30/24  Posture/Lumbar roll: discussed chair ergonomics at length 1/25/24 and suggested pictures brought in  Fridge Magnet Discharge handout (date given): -  Equipment at home/gym membership: [employer]    Education provided:   - Cues with exercises  - MedX performance  - Precor ex performance.   - correct mechanisms of lifting 1/30/24    Written Home Exercises Provided: Patient instructed to cont prior HEP.  Exercises were reviewed and Vivienne was able to demonstrate them prior to the end of the session.  Vivienne demonstrated fair understanding of the education provided.     See EMR under Patient Instructions for exercises provided prior visit.    Assessment      Viivenne returns with minimal back and hip discomfort/stiffness and notices over all improvements in ability to walk, stiffness, and mobility with tasks at home.  Treatment continued with flexibility, strengthening and neuromuscular reeducation ex's.  She was also progressed with reps for bridging and BKFO.   Provided and performed lifting education, noting cuing needed and strategies of stretching out with ball after helpful.  Recommend revisiting function and lifting tasks for home next visit.    Performed bike today for preference.   She was able to perform ex's with min cues and without increased pain. Lumbar MedX med tolerated well increasing to 18 reps at 69 ft lbs, exertion 4.     She was able to complete peripheral strengthening exercises without increased pain.  Will continue to progress treatment per HB protocol and pt's tolerance.  Patient reported feeling less stiff and good after visit    Patient is making progress towards established goals.  Pt will continue to benefit from skilled outpatient physical therapy to address the deficits stated in the impairment chart, provide pt/family education and to maximize pt's level of independence in the home and community environment.     Anticipated Barriers for therapy: none  Pt's spiritual, cultural and educational needs considered and pt agreeable to plan of care and goals as stated below:     Goals:   Short term goals:  6 weeks or 10 visits   - Pt will demonstrate increased lumbar MedX ROM by at  least 3 degrees from the initial ROM value with improvements noted in functional ROM and ability to perform ADLs. (MET 1/25/24)  - Pt will demonstrate increased MedX average isometric strength value by 20% from initial test resulting in improved ability to perform bending, lifting, and carrying activities safely, confidently. (MET 1/25/24)  - Pt will report a reduction in worst pain score by 2-3 points for improved tolerance for standing. Appropriate and Ongoing  - Pt able to perform HEP correctly with minimal cueing or supervision from therapist to encourage independent management of symptoms. Appropriate and Ongoing     Long term goals: 10 weeks or 20 visits   - Pt will demonstrate increased lumbar MedX ROM by at least 9 degrees from initial ROM value, resulting in improved ability to perform functional forward bending while standing and sitting. Appropriate and Ongoing  - Pt will demonstrate increased MedX average isometric strength value by 40% from initial test resulting in improved ability to perform bending, lifting, and carrying activities safely and confidently. Appropriate and Ongoing  - Pt to demonstrate ability to independently control and reduce their pain through posture positioning and mechanical movements throughout a typical day. Appropriate and Ongoing  - Pt will demonstrate reduced pain and improved functional outcomes as reported on the FOTO by reaching an intake score of >/= 60% functional ability in order to demonstrate subjective improvement in patient's condition. . Appropriate and Ongoing  - Pt will demonstrate independence with the HEP at discharge. Appropriate and Ongoing  - Pt will be able to stand at least 30 minutes while doing household tasks. (patient goal) Appropriate and Ongoing    Plan   Continue with established Plan of Care towards established PT goals.     Therapist: Sweetie Leon, PT  1/30/2024

## 2024-02-01 ENCOUNTER — OFFICE VISIT (OUTPATIENT)
Dept: NEUROSURGERY | Facility: CLINIC | Age: 74
End: 2024-02-01
Payer: MEDICARE

## 2024-02-01 ENCOUNTER — CLINICAL SUPPORT (OUTPATIENT)
Dept: ALLERGY | Facility: CLINIC | Age: 74
End: 2024-02-01
Payer: MEDICARE

## 2024-02-01 VITALS
HEIGHT: 62 IN | BODY MASS INDEX: 44.38 KG/M2 | WEIGHT: 241.19 LBS | HEART RATE: 59 BPM | SYSTOLIC BLOOD PRESSURE: 126 MMHG | DIASTOLIC BLOOD PRESSURE: 58 MMHG

## 2024-02-01 DIAGNOSIS — J30.9 CHRONIC ALLERGIC RHINITIS: Primary | ICD-10-CM

## 2024-02-01 DIAGNOSIS — M48.061 SPINAL STENOSIS, LUMBAR REGION WITHOUT NEUROGENIC CLAUDICATION: ICD-10-CM

## 2024-02-01 DIAGNOSIS — M43.16 SPONDYLOLISTHESIS, LUMBAR REGION: ICD-10-CM

## 2024-02-01 DIAGNOSIS — M51.36 DDD (DEGENERATIVE DISC DISEASE), LUMBAR: ICD-10-CM

## 2024-02-01 DIAGNOSIS — G89.29 CHRONIC BILATERAL LOW BACK PAIN WITHOUT SCIATICA: Primary | ICD-10-CM

## 2024-02-01 DIAGNOSIS — M54.50 CHRONIC BILATERAL LOW BACK PAIN WITHOUT SCIATICA: Primary | ICD-10-CM

## 2024-02-01 DIAGNOSIS — M47.816 SPONDYLOSIS OF LUMBAR REGION WITHOUT MYELOPATHY OR RADICULOPATHY: ICD-10-CM

## 2024-02-01 PROCEDURE — 1157F ADVNC CARE PLAN IN RCRD: CPT | Mod: HCNC,CPTII,S$GLB, | Performed by: PHYSICIAN ASSISTANT

## 2024-02-01 PROCEDURE — 1159F MED LIST DOCD IN RCRD: CPT | Mod: HCNC,CPTII,S$GLB, | Performed by: PHYSICIAN ASSISTANT

## 2024-02-01 PROCEDURE — 3288F FALL RISK ASSESSMENT DOCD: CPT | Mod: HCNC,CPTII,S$GLB, | Performed by: PHYSICIAN ASSISTANT

## 2024-02-01 PROCEDURE — 3078F DIAST BP <80 MM HG: CPT | Mod: HCNC,CPTII,S$GLB, | Performed by: PHYSICIAN ASSISTANT

## 2024-02-01 PROCEDURE — 3072F LOW RISK FOR RETINOPATHY: CPT | Mod: HCNC,CPTII,S$GLB, | Performed by: PHYSICIAN ASSISTANT

## 2024-02-01 PROCEDURE — 3008F BODY MASS INDEX DOCD: CPT | Mod: HCNC,CPTII,S$GLB, | Performed by: PHYSICIAN ASSISTANT

## 2024-02-01 PROCEDURE — 3074F SYST BP LT 130 MM HG: CPT | Mod: HCNC,CPTII,S$GLB, | Performed by: PHYSICIAN ASSISTANT

## 2024-02-01 PROCEDURE — 99214 OFFICE O/P EST MOD 30 MIN: CPT | Mod: HCNC,S$GLB,, | Performed by: PHYSICIAN ASSISTANT

## 2024-02-01 PROCEDURE — 99999 PR PBB SHADOW E&M-EST. PATIENT-LVL V: CPT | Mod: PBBFAC,HCNC,, | Performed by: PHYSICIAN ASSISTANT

## 2024-02-01 PROCEDURE — 1160F RVW MEDS BY RX/DR IN RCRD: CPT | Mod: HCNC,CPTII,S$GLB, | Performed by: PHYSICIAN ASSISTANT

## 2024-02-01 PROCEDURE — 1101F PT FALLS ASSESS-DOCD LE1/YR: CPT | Mod: HCNC,CPTII,S$GLB, | Performed by: PHYSICIAN ASSISTANT

## 2024-02-01 PROCEDURE — 4010F ACE/ARB THERAPY RXD/TAKEN: CPT | Mod: HCNC,CPTII,S$GLB, | Performed by: PHYSICIAN ASSISTANT

## 2024-02-01 PROCEDURE — 95115 IMMUNOTHERAPY ONE INJECTION: CPT | Mod: HCNC,S$GLB,, | Performed by: STUDENT IN AN ORGANIZED HEALTH CARE EDUCATION/TRAINING PROGRAM

## 2024-02-01 PROCEDURE — 1125F AMNT PAIN NOTED PAIN PRSNT: CPT | Mod: HCNC,CPTII,S$GLB, | Performed by: PHYSICIAN ASSISTANT

## 2024-02-01 NOTE — PROGRESS NOTES
Subjective:     Patient ID:  Vivienne Jose is a 73 y.o. female.    Emanuel    Chief Complaint:  Back pain    Interval History:   02/01/2024    Patient has been to 11 sessions of healthy back.  She feels like she is able to walk a bit longer.  She still can only stand for about 5 minutes due to the back pain.  She feels pain in her back across the back into the bilateral hips.  Sometimes she will feel some pain down the right lateral thigh.  She still has sessions left of healthy back to finish.    HPI   02/01/2024    Vivienne Jose is a 73 y.o. female who presents with the above CC.  Patient has had back pain for many years.  Pain has been getting progressively worsening pain across the right hip and buttock region and across the low back.  She describes the pain as burning.  It is worse with walking and standing and better with sitting lying down.  She does get some pins and needles sensation in her left leg.  She had right lateral leg pain to the calf in the past but this has gone away.      She had physical therapy in 2018 through the healthy back program that helped.  It really helped her knee pain.  No epidural steroid injections and no spine surgery.  She takes gabapentin 600 mg twice a day.    Patient denies any recent accidents or trauma, no saddle anesthesias, and no bowel or bladder incontinence.      Review of Systems:    Review of Systems   Constitutional:  Negative for chills, diaphoresis, fever, malaise/fatigue and weight loss.   HENT:  Negative for congestion, ear discharge, ear pain, hearing loss, nosebleeds, sinus pain, sore throat and tinnitus.    Eyes:  Negative for blurred vision, double vision, photophobia, pain, discharge and redness.   Respiratory:  Negative for cough, hemoptysis, sputum production, shortness of breath, wheezing and stridor.    Cardiovascular:  Negative for chest pain, palpitations, orthopnea, leg swelling and PND.   Gastrointestinal:  Negative for abdominal pain, blood in stool,  constipation, diarrhea, heartburn, melena, nausea and vomiting.   Genitourinary:  Negative for dysuria, flank pain, frequency, hematuria and urgency.   Musculoskeletal:  Positive for back pain. Negative for falls, joint pain, myalgias and neck pain.   Skin:  Negative for itching and rash.   Neurological:  Positive for dizziness. Negative for tingling, tremors, sensory change, speech change, seizures, loss of consciousness, weakness and headaches.   Endo/Heme/Allergies:  Negative for environmental allergies and polydipsia. Bruises/bleeds easily.   Psychiatric/Behavioral:  Negative for depression, hallucinations, memory loss and substance abuse. The patient is not nervous/anxious and does not have insomnia.          Past Medical History:   Diagnosis Date    Allergy     Angio-edema     Arthritis     Cataract     bilateral - not removed    Cerebrovascular malformation     Cirrhosis 11/24/2020    Colon polyps     Diabetes mellitus, type 2     Diabetic peripheral neuropathy     Edema of both feet 8/19/2022    GERD (gastroesophageal reflux disease)     Herpes infection     Hypothyroidism     Kidney stones     Mild nonproliferative diabetic retinopathy of 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 edema associated with type 2 diabetes mellitus 4/18/2019    DEL RIO (nonalcoholic steatohepatitis) 8/19/2022    Nausea & vomiting 11/23/2015    Obesity, morbid     Ovarian cyst     Seizure disorder, focal motor     Sleep apnea     Type II or unspecified type diabetes mellitus with neurological manifestations, uncontrolled(250.62)     Urticaria      Past Surgical History:   Procedure Laterality Date    CARPAL TUNNEL RELEASE Right 2014    CATARACT EXTRACTION W/  INTRAOCULAR LENS IMPLANT Right 9/19/2023    Procedure: EXTRACTION, CATARACT, WITH IOL INSERTION;  Surgeon: Lyndon Ortiz MD;  Location: [facility];  Service: Ophthalmology;  Laterality: Right;    CATARACT EXTRACTION W/  INTRAOCULAR LENS IMPLANT Left 10/3/2023    Procedure: EXTRACTION,  CATARACT, WITH IOL INSERTION;  Surgeon: Lyndon Ortiz MD;  [facility];  Service: Ophthalmology;  Laterality: Left;    COLONOSCOPY      COLONOSCOPY N/A 9/13/2017    Procedure: COLONOSCOPY Golytely;  Surgeon: Gisela Wall MD;  Location: [facility] ENDO;  Service: Endoscopy;  Laterality: N/A;    COLONOSCOPY N/A 9/9/2022    Procedure: COLONOSCOPY Extended Suprep;  Surgeon: Britt Jasso MD;  Location: [facility] ENDO;  Service: Endoscopy;  Laterality: N/A;    CYST REMOVAL      skin; multiples    ESOPHAGOGASTRODUODENOSCOPY Left 10/15/2021    Procedure: EGD (ESOPHAGOGASTRODUODENOSCOPY);  Surgeon: Luis Fernando Taveras MD;  Location: [facility] (4TH FLR);  Service: Endoscopy;  Laterality: Left;  cirrhosis labwork am of procedure  last seizure 1973  COVID [facility] 10/12/21 [facility]    ESOPHAGOGASTRODUODENOSCOPY N/A 3/10/2023    Procedure: EGD (ESOPHAGOGASTRODUODENOSCOPY);  Surgeon: Christa Caldera MD;  Location: [facility] (2ND FLR);  Service: Endoscopy;  Laterality: N/A;  Medically Urgent  cirrohsis-labs done on 2/9/23 (< 90 days)  New onset A-fib  3/1 instructions to portal-st  Precall complete- [place]    EXTRACORPOREAL SHOCK WAVE LITHOTRIPSY  2002    HYSTERECTOMY  1984    JOINT REPLACEMENT Right 03/06/2019    knee    KIDNEY STONE SURGERY      KNEE ARTHROPLASTY Right 3/6/2019    Procedure: ARTHROPLASTY, KNEE;  Surgeon: Pj Gamboa MD;  Location: [facility] OR;  Service: Orthopedics;  Laterality: Right;  Depuy (Stephen notified 2/21, CC)    THYROIDECTOMY  1977         TONSILLECTOMY, ADENOIDECTOMY      TRIGGER FINGER RELEASE      TUBAL LIGATION       Current Outpatient Medications on File Prior to Visit   Medication Sig Dispense Refill    ascorbic acid, vitamin C, (VITAMIN C) 100 MG tablet Take 500 mg by mouth 2 (two) times daily.       aspirin (ECOTRIN) 81 MG EC tablet Take 81 mg by mouth once daily.      betamethasone dipropionate (DIPROLENE) 0.05 % ointment Apply topically 2 (two) times daily. Use to affected  areas for up to 2 weeks then take a 1 week break or decrease to 3 times weekly. Do not apply to groin or face. Use to spot on ear when flaring 15 g 2    blood sugar diagnostic (TRUE METRIX GLUCOSE TEST STRIP) Strp TEST TWO TIMES DAILY 200 strip 6    blood-glucose meter Misc [employer] True Metrix Air meter 1 each 0    calcium carbonate (CALCIUM 500 ORAL) Take 1,000 mg by mouth.      calcium carbonate-vitamin D3 600 mg(1,500mg) -100 unit Cap Take 1 tablet by mouth once daily.      estradioL (ESTRING) 2 mg (7.5 mcg /24 hour) vaginal ring Remove old Estring, place the new one every 3 months. 1 each 3    fluticasone propionate (FLONASE) 50 mcg/actuation nasal spray 2 sprays (100 mcg total) by Each Nostril route once daily. 48 g 2    gabapentin (NEURONTIN) 600 MG tablet Take 1 tablet (600 mg total) by mouth 3 (three) times daily. 180 tablet 3    glipiZIDE (GLUCOTROL) 5 MG tablet TAKE 1 TABLET TWICE DAILY BEFORE MEALS 180 tablet 0    ipratropium (ATROVENT) 42 mcg (0.06 %) nasal spray 2 sprays by Each Nostril route 4 (four) times daily. 45 mL 4    ketotifen (ZADITOR) 0.025 % (0.035 %) ophthalmic solution Place 1-2 drops into both eyes once daily.      L.acid-B.bifidum-B.animal-FOS 25 billion cell -100 mg Cap Take 1 capsule by mouth once daily.      lancets (TRUEPLUS LANCETS) 28 gauge [facility] Inject 1 lancet into the skin 2 (two) times daily before meals. 200 each 6    levothyroxine (SYNTHROID) 75 MCG tablet TAKE 1 TABLET EVERY DAY 90 tablet 3    loratadine (CLARITIN) 10 mg tablet Take 10 mg by mouth once daily.      lovastatin (MEVACOR) 40 MG tablet TAKE 1 TABLET NIGHTLY (SUBSTITUTED FOR MEVACOR) 90 tablet 1    metFORMIN (GLUCOPHAGE) 1000 MG tablet TAKE 1 TABLET TWICE DAILY WITH MEALS 180 tablet 0    mv-mn/folic acid/vit [id number] (ALIVE ONCE DAILY WOMEN 50 PLUS ORAL) Take 1 tablet by mouth once daily.      omeprazole (PRILOSEC) 20 MG capsule Take 1 capsule (20 mg total) by mouth daily as needed (acid reflux). 90 capsule 3     "OZEMPIC 0.25 mg or 0.5 mg (2 mg/3 mL) pen injector INJECT 0.5MG UNDER THE SKIN ONE TIME WEEKLY (Patient taking differently: Tues) 9 each 0    pen needle, diabetic (BD ULTRA-FINE EDUARDO PEN NEEDLE) 32 gauge x 5/32" Ndle USE AS DIRECTED 200 each 6    semaglutide (OZEMPIC) 0.25 mg or 0.5 mg(2 mg/1.5 mL) pen injector Inject 0.5 mg into the skin every 7 days. 3 pen 4    traMADoL (ULTRAM) 50 mg tablet Take 1 tablet (50 mg total) by mouth every 8 (eight) hours as needed for Pain. 21 tablet 0    TRESIBA FLEXTOUCH U-100 100 unit/mL (3 mL) insulin pen INJECT 38 UNITS INTO THE SKIN EVERY EVENING. (Patient taking differently: INJECT 25 UNITS INTO THE SKIN EVERY EVENING.) 45 mL 2    UNABLE TO FIND Take 1 tablet by mouth 2 (two) times a day. medication name: Magnesium 400mg, Calcium 1000 mg, D3 15mcg, Zinc 15mg      valACYclovir (VALTREX) 500 MG tablet Take 1 tablet (500 mg total) by mouth once daily. (Patient taking differently: Take 500 mg by mouth as needed.) 90 tablet 3    valsartan (DIOVAN) 160 MG tablet TAKE 1 TABLET TWICE DAILY 180 tablet 2    azelastine (ASTELIN) 137 mcg (0.1 %) nasal spray 2 sprays (274 mcg total) by Nasal route 2 (two) times daily. 120 mL 4    furosemide (LASIX) 20 MG tablet Take 1 tablet (20 mg total) by mouth once daily. 30 tablet 11    spironolactone (ALDACTONE) 50 MG tablet Take 1 tablet (50 mg total) by mouth once daily. 30 tablet 11     No current facility-administered medications on file prior to visit.     Review of patient's allergies indicates:   Allergen Reactions    Sulfa (sulfonamide antibiotics) Nausea Only    Ciprofloxacin     Januvia [sitagliptin]      abd pain    Lisinopril     Lotensin [benazepril]     Nexium [esomeprazole magnesium] Other (See Comments)     Gas     Social History     Socioeconomic History    Marital status:    Occupational History    Occupation: retired     Employer: [employer]   Tobacco Use    Smoking status: Never    Smokeless tobacco: Never "   Substance and Sexual Activity    Alcohol use: No     Alcohol/week: 0.0 standard drinks of alcohol    Drug use: No    Sexual activity: Yes     Partners: Male     Social Determinants of Health     Financial Resource Strain: Low Risk  (1/17/2024)    Overall Financial Resource Strain (CARDIA)     Difficulty of Paying Living Expenses: Not hard at all   Food Insecurity: No Food Insecurity (1/17/2024)    Hunger Vital Sign     Worried About Running Out of Food in the Last Year: Never true     Ran Out of Food in the Last Year: Never true   Transportation Needs: No Transportation Needs (1/17/2024)    PRAPARE - Transportation     Lack of Transportation (Medical): No     Lack of Transportation (Non-Medical): No   Physical Activity: Inactive (1/17/2024)    Exercise Vital Sign     Days of Exercise per Week: 0 days     Minutes of Exercise per Session: 0 min   Stress: Stress Concern Present (1/17/2024)    [place] [employer] - Occupational Stress Questionnaire     Feeling of Stress : To some extent   Social Connections: Unknown (1/17/2024)    Social Connection and Isolation Panel [NHANES]     Frequency of Communication with Friends and Family: Patient declined     Frequency of Social Gatherings with Friends and Family: Patient declined     Active Member of Clubs or Organizations: No     Attends Club or Organization Meetings: Never     Marital Status:    Housing Stability: Low Risk  (1/17/2024)    Housing Stability Vital Sign     Unable to Pay for Housing in the Last Year: No     Number of Places Lived in the Last Year: 1     Unstable Housing in the Last Year: No     Family History   Problem Relation Age of Onset    Skin cancer Mother     Macular degeneration Mother     Dementia Mother     Hypertension Mother     Cancer Father     Arthritis Father     Gout Father     No Known Problems Daughter     Diabetes Daughter     Hypertension Daughter     Arthritis Daughter     Allergies Son     Allergic rhinitis  "Son     Glaucoma Maternal Aunt         Great Maternal Aunt    Leukemia Maternal Uncle     Amblyopia Neg Hx     Cataracts Neg Hx     Retinal detachment Neg Hx     Strabismus Neg Hx     Stroke Neg Hx     Thyroid disease Neg Hx     Kidney disease Neg Hx     Angioedema Neg Hx     Asthma Neg Hx     Atopy Neg Hx     Eczema Neg Hx     Immunodeficiency Neg Hx     Rhinitis Neg Hx     Urticaria Neg Hx        Objective:      Vitals:    02/01/24 1500   BP: (!) 126/58   Pulse: (!) 59   Weight: 109.4 kg (241 lb 2.9 oz)   Height: 5' 2" (1.575 m)   PainSc:   2   PainLoc: Back         XRAY/CT Interpretation:     Lumbar spine xrays were personally reviewed today.  No fractures.  No movement on flexion and extension.  Grade one spondylolisthesis at L4-5.    Lumbar spine CT was personally reviewed today.  Grade one spondylolisthesis at L4-5 with spinal stenosis.        Assessment:          1. Chronic bilateral low back pain without sciatica    2. Spondylosis of lumbar region without myelopathy or radiculopathy    3. DDD (degenerative disc disease), lumbar    4. Spinal stenosis, lumbar region without neurogenic claudication    5. Spondylolisthesis, lumbar region              Plan:                 L4-5 grade one spondylolisthesis with spinal stenosis    -finish Healthy Back PT  -she will message back to let me know how she is doing.  If still symptomatic will get MRI lumbar spine and consider referral to pain management for interventional pain procedure options    Follow-Up:  Follow up if symptoms worsen or fail to improve. If there are any questions prior to this, the patient was instructed to contact the office.       Deja Feldman [facility], PA-C  Neurosurgery  [facility]  02/01/2024          "

## 2024-02-06 ENCOUNTER — CLINICAL SUPPORT (OUTPATIENT)
Dept: REHABILITATION | Facility: HOSPITAL | Age: 74
End: 2024-02-06
Payer: MEDICARE

## 2024-02-06 DIAGNOSIS — R29.3 POOR POSTURE: ICD-10-CM

## 2024-02-06 DIAGNOSIS — M25.69 DECREASED RANGE OF MOTION OF TRUNK AND BACK: Primary | ICD-10-CM

## 2024-02-06 DIAGNOSIS — R29.898 DECREASED STRENGTH OF TRUNK AND BACK: ICD-10-CM

## 2024-02-06 PROCEDURE — 97110 THERAPEUTIC EXERCISES: CPT

## 2024-02-06 PROCEDURE — 97112 NEUROMUSCULAR REEDUCATION: CPT

## 2024-02-06 NOTE — PROGRESS NOTES
[facility]  Physical Therapy Treatment Note     Name: Vivienne Jose  Clinic Number: [id number]    Therapy Diagnosis:   Encounter Diagnoses   Name Primary?    Decreased range of motion of trunk and back Yes    Decreased strength of trunk and back     Poor posture      Physician: Deja Feldman, *    Visit Date: 2/6/2024    Physician Orders: PT Eval and Treat  Medical Diagnosis from Referral: M47.816 (ICD-10-CM) - Spondylosis of lumbar region without myelopathy or radiculopathy   Evaluation Date: 12/7/2023  Authorization Period Expiration: 12/31/2024  Reassessment Due: 2/29/2024  Plan of Care Certification Period: 3/7/2024  Visit #/Visits authorized: 12/20 Increased extension hold on medx 1 sec after testing visit 10 to assist with extension strengthening   Testing:MedX testing visit 2    Time In: 9:00 AM  Time Out: 10:00 AM  Total Billable Time: 55 minutes  INSURANCE and OUTCOMES: Fee for Service with FOTO Outcomes 2/3     Precautions: standard, diabetes, Hx of R TKA     Pattern of pain determined: 2    Subjective   Vivienne reports no c/o back pain currently. States 2 days after last visit she felt some muscular soreness. She noticed she was able to stand for a longer period while taking a shower this morning. Usually she has to sit down    Patient reports tolerating previous visit ; Muscular soreness  Patient reports their pain to be 0/10 on a 0-10 scale with 0 being no pain and 10 being the worst pain imaginable.  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   Pt goals: improve standing and walking tolerance, walk for exercise     Objective      MOVEMENT LOSS - Lumbar    Norms ROM Loss Initial ROM 1/30/24   Flexion Fingers touch toes, sacral angle >/= 70 deg, uniform spinal curvature, posterior weight shift  minimal loss Within functional limits   Extension ASIS surpasses toes, spine of scapulae surpasses heels,  uniform spinal curve moderate loss moderate loss   Side glide Right   minimal loss minimal loss   Side glide Left   minimal loss minimal loss   Rotation Right PT observes contralateral shoulder moderate loss minimal loss   Rotation Left PT observes contralateral shoulder moderate loss minimal loss      Lumbar  Isometric Testing on Med X equipment: Testing administered by PT    Test Initial Baseline Midpoint Final   Date 12/14/23 1/25/24    ROM 0-42 deg 0-48    Max Peak Torque 142  203    Min Peak Torque 71  78    Flex/Ext Ratio 2:1 2.3/1    % below normative data 9 48% above    % gain from initial test Not available visit 1 27% gain    Pt pushing through B LE during testing initial      Outcomes:  Intake Score: 51% functional ability  Visit 6 Score: 53%  Visit 10 Score: 53%  Discharge Score:  Goal Score: 60% functional ability          Treatment     Vivienne received the treatments listed below:      Vivienne received neuromuscular education  to isolate and engage spinal stabilization musculature correctly for motor control and coordination to aid in function and posture for 10 minutes on the Medical Medx Machine.  Patient performed MedX dynamic exercise with emphasis on spinal muscular control using pacer throughout  active range of motion. Therapist assisted patient in achieving optimal exertion for neural reeducation and endurance training by using the  Naga Exertion Rating scale, by instructing the patient to aim for mid range of exertion, performing 15-20 repetitions, slowly, correctly,and safely.        2/6/2024     9:05 AM   [facility]   Visit Number 12   VAS Pain Rating 0   Time 5   Lumbar Stretches - Slouch Overcorrection 10   Extension in Standing 10   Flexion in Lying 10   Lumbar Weight 69 lbs   Repetitions 20   Rating of Perceived Exertion 3   Ice - Sitting 5 mins.      Vivienne participated in therapeutic exercises to develop strength, ROM, and core stabilization for 45 minutes including:    LTR  x10  Hip ER stretch 5 x 10 sec  DKTC  w/T-ball support x 10  Supine PPT 5 sec hold x10-NP  TrA activation (T-ball) + SLR 2# x 15  BKFO BTB x20  Bridges +BTB x20  SOC x10  seated thoracic extension w/1/2 foam roll x 10  EIS x10      Peripheral muscle strengthening which included one set of 15-20 repetitions at a slow and controlled 10-13 second per rep pace focused on strengthening supporting musculature in order to improve body mechanics and functional mobility. Patient and therapist focused on proper form during treatment to ensure optimal strengthening of each targeted muscle group.  Machines utilized included: Torso rotation, leg press, hip abd/add and leg extension Leg curls, bicep curls, tricep extension, chest press (Tband) and rows.     Therapeutic activity for 15 min to assist with lifting, grocery shopping, house work focusing on:  Floor to waist lift (5 lbs) considerable cuing for body mech  Hip hinge 10  Golfer's lift 10  Some soreness after lifting, dissipated with stretching in sitting with ball.  Discussed stretching after lifts at home to manage symptoms, or altering how she is lifting    Vivienne received manual therapy techniques for 00  minutes. The following activities were included:    Pt given cold pack for 5 minutes to low back     Patient Education and Home Exercises   Home exercises include: PPT, bridges, clamshells, seated flexion   Cardio program (V5): - 1/9/24  Lifting education (V11): -  1/30/24  Posture/Lumbar roll: discussed chair ergonomics at length 1/25/24 and suggested pictures brought in  Fridge Magnet Discharge handout (date given): -  Equipment at home/gym membership: [employer]    Education provided:   - Cues with exercises  - MedX performance  - Precor ex performance.   - correct mechanisms of lifting 1/30/24    Written Home Exercises Provided: Patient instructed to cont prior HEP.  Exercises were reviewed and Vivienne was able to demonstrate them prior to the end of the  session.  Vivienne demonstrated fair understanding of the education provided.     See EMR under Patient Instructions for exercises provided prior visit.    Assessment   Vivienne returns with no c/o back pain currently. Treatment continued with flexibility, strengthening and neuromuscular reeducation ex's. She was able to perform ex's with min cues and without increased pain. Lifting education performed last visit - Recommend revisiting function and lifting tasks for home next visit. Lumbar MedX resistance was maintained at 69 ft/lbs and she completed 20 reps with RPE = 3/10. She was able to complete peripheral strengthening exercises without increased pain.  Will continue to progress treatment per HB protocol and pt's tolerance.     Patient is making progress towards established goals.  Pt will continue to benefit from skilled outpatient physical therapy to address the deficits stated in the impairment chart, provide pt/family education and to maximize pt's level of independence in the home and community environment.     Anticipated Barriers for therapy: none  Pt's spiritual, cultural and educational needs considered and pt agreeable to plan of care and goals as stated below:     Goals:   Short term goals:  6 weeks or 10 visits   - Pt will demonstrate increased lumbar MedX ROM by at least 3 degrees from the initial ROM value with improvements noted in functional ROM and ability to perform ADLs. (MET 1/25/24)  - Pt will demonstrate increased MedX average isometric strength value by 20% from initial test resulting in improved ability to perform bending, lifting, and carrying activities safely, confidently. (MET 1/25/24)  - Pt will report a reduction in worst pain score by 2-3 points for improved tolerance for standing. Appropriate and Ongoing  - Pt able to perform HEP correctly with minimal cueing or supervision from therapist to encourage independent management of symptoms. Appropriate and Ongoing     Long term goals: 10  weeks or 20 visits   - Pt will demonstrate increased lumbar MedX ROM by at least 9 degrees from initial ROM value, resulting in improved ability to perform functional forward bending while standing and sitting. Appropriate and Ongoing  - Pt will demonstrate increased MedX average isometric strength value by 40% from initial test resulting in improved ability to perform bending, lifting, and carrying activities safely and confidently. Appropriate and Ongoing  - Pt to demonstrate ability to independently control and reduce their pain through posture positioning and mechanical movements throughout a typical day. Appropriate and Ongoing  - Pt will demonstrate reduced pain and improved functional outcomes as reported on the FOTO by reaching an intake score of >/= 60% functional ability in order to demonstrate subjective improvement in patient's condition. . Appropriate and Ongoing  - Pt will demonstrate independence with the HEP at discharge. Appropriate and Ongoing  - Pt will be able to stand at least 30 minutes while doing household tasks. (patient goal) Appropriate and Ongoing    Plan   Continue with established Plan of Care towards established PT goals.     Therapist: Angelica Maldonado, PT  2/6/2024

## 2024-02-08 ENCOUNTER — CLINICAL SUPPORT (OUTPATIENT)
Dept: REHABILITATION | Facility: HOSPITAL | Age: 74
End: 2024-02-08
Payer: MEDICARE

## 2024-02-08 DIAGNOSIS — R29.898 DECREASED STRENGTH OF TRUNK AND BACK: ICD-10-CM

## 2024-02-08 DIAGNOSIS — M25.69 DECREASED RANGE OF MOTION OF TRUNK AND BACK: Primary | ICD-10-CM

## 2024-02-08 DIAGNOSIS — R29.3 POOR POSTURE: ICD-10-CM

## 2024-02-08 PROCEDURE — 97110 THERAPEUTIC EXERCISES: CPT

## 2024-02-08 PROCEDURE — 97112 NEUROMUSCULAR REEDUCATION: CPT

## 2024-02-08 NOTE — PROGRESS NOTES
[facility]  Physical Therapy Treatment Note     Name: Vivienne Jose  Clinic Number: [id number]    Therapy Diagnosis:   Encounter Diagnoses   Name Primary?    Decreased range of motion of trunk and back Yes    Decreased strength of trunk and back     Poor posture      Physician: Deja Feldman, *    Visit Date: 2/8/2024    Physician Orders: PT Eval and Treat  Medical Diagnosis from Referral: M47.816 (ICD-10-CM) - Spondylosis of lumbar region without myelopathy or radiculopathy   Evaluation Date: 12/7/2023  Authorization Period Expiration: 12/31/2024  Reassessment Due: 2/29/2024  Plan of Care Certification Period: 3/7/2024  Visit #/Visits authorized: 13/20   Testing:MedX testing visit 2    Time In: 2:30 PM  Time Out: 3:30 PM  Total Billable Time: 55 minutes  INSURANCE and OUTCOMES: Fee for Service with FOTO Outcomes 2/3     Precautions: standard, diabetes, Hx of R TKA     Pattern of pain determined: 2    Subjective   Vivienne arrives with no c/o back pain.     Patient reports tolerating previous visit ; Muscular soreness  Patient reports their pain to be 0/10 on a 0-10 scale with 0 being no pain and 10 being the worst pain imaginable.  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   Pt goals: improve standing and walking tolerance, walk for exercise     Objective      MOVEMENT LOSS - Lumbar    Norms ROM Loss Initial ROM 1/30/24   Flexion Fingers touch toes, sacral angle >/= 70 deg, uniform spinal curvature, posterior weight shift  minimal loss Within functional limits   Extension ASIS surpasses toes, spine of scapulae surpasses heels, uniform spinal curve moderate loss moderate loss   Side glide Right   minimal loss minimal loss   Side glide Left   minimal loss minimal loss   Rotation Right PT observes contralateral shoulder moderate loss minimal loss   Rotation Left PT observes contralateral shoulder moderate loss  minimal loss      Lumbar  Isometric Testing on Med X equipment: Testing administered by PT    Test Initial Baseline Midpoint Final   Date 12/14/23 1/25/24    ROM 0-42 deg 0-48    Max Peak Torque 142  203    Min Peak Torque 71  78    Flex/Ext Ratio 2:1 2.3/1    % below normative data 9 48% above    % gain from initial test Not available visit 1 27% gain    Pt pushing through B LE during testing initial      Outcomes:  Intake Score: 51% functional ability  Visit 6 Score: 53%  Visit 10 Score: 53%  Discharge Score:  Goal Score: 60% functional ability          Treatment     Vivienne received the treatments listed below:      Vivienne received neuromuscular education  to isolate and engage spinal stabilization musculature correctly for motor control and coordination to aid in function and posture for 10 minutes on the Medical Medx Machine.  Patient performed MedX dynamic exercise with emphasis on spinal muscular control using pacer throughout  active range of motion. Therapist assisted patient in achieving optimal exertion for neural reeducation and endurance training by using the  Naga Exertion Rating scale, by instructing the patient to aim for mid range of exertion, performing 15-20 repetitions, slowly, correctly,and safely.        2/8/2024     3:06 PM   [facility]   Visit Number 13   VAS Pain Rating 0   Treadmill Time (in min.) 5 min   Lumbar Stretches - Slouch Overcorrection 10   Extension in Standing 10   Flexion in Lying 10   Lumbar Weight 73 lbs   Repetitions 18   Rating of Perceived Exertion 3   Ice - Sitting 5 mins.      Vivienne participated in therapeutic exercises to develop strength, ROM, and core stabilization for 45 minutes including:    LTR x10  Hip ER stretch 5 x 10 sec  DKTC  w/T-ball support x 10  Supine PPT 5 sec hold x10-NP  TrA activation (T-ball) + SLR 2# x20  BKFO BTB x20  Bridges +BTB x20  SOC x10  seated thoracic extension w/1/2 foam roll x 10  EIS x10      Peripheral muscle strengthening which  included one set of 15-20 repetitions at a slow and controlled 10-13 second per rep pace focused on strengthening supporting musculature in order to improve body mechanics and functional mobility. Patient and therapist focused on proper form during treatment to ensure optimal strengthening of each targeted muscle group.  Machines utilized included: Torso rotation, leg press, hip abd/add and leg extension Leg curls, bicep curls, tricep extension, chest press (Tband) and rows.     Therapeutic activity for 15 min to assist with lifting, grocery shopping, house work focusing on:  Floor to waist lift (5 lbs) considerable cuing for body mech  Hip hinge 10  Golfer's lift 10  Some soreness after lifting, dissipated with stretching in sitting with ball.  Discussed stretching after lifts at home to manage symptoms, or altering how she is lifting    Vivienne received manual therapy techniques for 00  minutes. The following activities were included:    Pt given cold pack for 5 minutes to low back     Patient Education and Home Exercises   Home exercises include: PPT, bridges, clamshells, seated flexion   Cardio program (V5): - 1/9/24  Lifting education (V11): -  1/30/24  Posture/Lumbar roll: discussed chair ergonomics at length 1/25/24 and suggested pictures brought in  Fridge Magnet Discharge handout (date given): -  Equipment at home/gym membership: [employer]    Education provided:   - Cues with exercises  - MedX performance  - Precor ex performance.   - correct mechanisms of lifting 1/30/24    Written Home Exercises Provided: Patient instructed to cont prior HEP.  Exercises were reviewed and Vivienne was able to demonstrate them prior to the end of the session.  Vivienne demonstrated fair understanding of the education provided.     See EMR under Patient Instructions for exercises provided prior visit.    Assessment   Vivienne returns with no c/o back pain currently. Treatment continued with flexibility, strengthening and  neuromuscular reeducation ex's. Progressed reps for TrA c/ T-ball + SLR which she tolerated well. She was able to perform ex's with min cues and without increased pain. Lumbar MedX resistance was increased to 73 ft/lbs and she completed 18 reps with RPE = 3/10. She was also able to complete peripheral strengthening exercises without increased pain.  Will continue to progress treatment per HB protocol and pt's tolerance.     Patient is making progress towards established goals.  Pt will continue to benefit from skilled outpatient physical therapy to address the deficits stated in the impairment chart, provide pt/family education and to maximize pt's level of independence in the home and community environment.     Anticipated Barriers for therapy: none  Pt's spiritual, cultural and educational needs considered and pt agreeable to plan of care and goals as stated below:     Goals:   Short term goals:  6 weeks or 10 visits   - Pt will demonstrate increased lumbar MedX ROM by at least 3 degrees from the initial ROM value with improvements noted in functional ROM and ability to perform ADLs. (MET 1/25/24)  - Pt will demonstrate increased MedX average isometric strength value by 20% from initial test resulting in improved ability to perform bending, lifting, and carrying activities safely, confidently. (MET 1/25/24)  - Pt will report a reduction in worst pain score by 2-3 points for improved tolerance for standing. Appropriate and Ongoing  - Pt able to perform HEP correctly with minimal cueing or supervision from therapist to encourage independent management of symptoms. Appropriate and Ongoing     Long term goals: 10 weeks or 20 visits   - Pt will demonstrate increased lumbar MedX ROM by at least 9 degrees from initial ROM value, resulting in improved ability to perform functional forward bending while standing and sitting. Appropriate and Ongoing  - Pt will demonstrate increased MedX average isometric strength value by  40% from initial test resulting in improved ability to perform bending, lifting, and carrying activities safely and confidently. Appropriate and Ongoing  - Pt to demonstrate ability to independently control and reduce their pain through posture positioning and mechanical movements throughout a typical day. Appropriate and Ongoing  - Pt will demonstrate reduced pain and improved functional outcomes as reported on the FOTO by reaching an intake score of >/= 60% functional ability in order to demonstrate subjective improvement in patient's condition. . Appropriate and Ongoing  - Pt will demonstrate independence with the HEP at discharge. Appropriate and Ongoing  - Pt will be able to stand at least 30 minutes while doing household tasks. (patient goal) Appropriate and Ongoing    Plan   Continue with established Plan of Care towards established PT goals.     Therapist: Angelica Maldonado, PT  2/8/2024

## 2024-02-09 ENCOUNTER — OFFICE VISIT (OUTPATIENT)
Dept: PODIATRY | Facility: CLINIC | Age: 74
End: 2024-02-09
Payer: MEDICARE

## 2024-02-09 DIAGNOSIS — E11.42 TYPE 2 DIABETES MELLITUS WITH PERIPHERAL NEUROPATHY: Primary | ICD-10-CM

## 2024-02-09 DIAGNOSIS — K74.69 OTHER CIRRHOSIS OF LIVER: ICD-10-CM

## 2024-02-09 DIAGNOSIS — K75.81 NASH (NONALCOHOLIC STEATOHEPATITIS): ICD-10-CM

## 2024-02-09 PROCEDURE — 4010F ACE/ARB THERAPY RXD/TAKEN: CPT | Mod: CPTII,95,, | Performed by: STUDENT IN AN ORGANIZED HEALTH CARE EDUCATION/TRAINING PROGRAM

## 2024-02-09 PROCEDURE — 1157F ADVNC CARE PLAN IN RCRD: CPT | Mod: CPTII,95,, | Performed by: STUDENT IN AN ORGANIZED HEALTH CARE EDUCATION/TRAINING PROGRAM

## 2024-02-09 PROCEDURE — 3072F LOW RISK FOR RETINOPATHY: CPT | Mod: CPTII,95,, | Performed by: STUDENT IN AN ORGANIZED HEALTH CARE EDUCATION/TRAINING PROGRAM

## 2024-02-09 PROCEDURE — 99213 OFFICE O/P EST LOW 20 MIN: CPT | Mod: 95,,, | Performed by: STUDENT IN AN ORGANIZED HEALTH CARE EDUCATION/TRAINING PROGRAM

## 2024-02-09 RX ORDER — DULOXETIN HYDROCHLORIDE 20 MG/1
20 CAPSULE, DELAYED RELEASE ORAL DAILY
Qty: 90 CAPSULE | Refills: 3 | Status: SHIPPED | OUTPATIENT
Start: 2024-02-09 | End: 2025-02-08

## 2024-02-09 NOTE — PROGRESS NOTES
The patient location is: home  The chief complaint leading to consultation is: painful diabetic neuropathy    Visit type: audiovisual    Face to Face time with patient: 10  15 minutes of total time spent on the encounter, which includes face to face time and non-face to face time preparing to see the patient (eg, review of tests), Obtaining and/or reviewing separately obtained history, Documenting clinical information in the electronic or other health record, Independently interpreting results (not separately reported) and communicating results to the patient/family/caregiver, or Care coordination (not separately reported).         Each patient to whom he or she provides medical services by telemedicine is:  (1) informed of the relationship between the physician and patient and the respective role of any other health care provider with respect to management of the patient; and (2) notified that he or she may decline to receive medical services by telemedicine and may withdraw from such care at any time.    Notes:     Has had some improvement with the gabapentin increase but may also be making her sleepy. Less pain in toes when going to sleep but feet are still very tender when she walks on them at night.     Plan and Decision Making:  Ongoing severe nerve pain affecting quality of life, must weigh pros and cons of standard first line medications carefully.   Gabapentin: Continue 1800 mg/day; cannot increase further due to sedation effect  Pregabalin: Previously attempted, did not tolerate, worsened pain.  Amitriptyline: Discussed anticholinergic effects and fall risk in older adults, patient declined at this time.   Duloxetine: Discussed that this may increase LFTs or cause liver damage, especially at medium to high doses. Must take her preexisting liver disease into account, although LFT trending has been unremarkable for a while. If this medication is started then recommend starting low dose with regular liver  monitoring. Patient reports upcoming labs and followup with hepatology in 1 month, she is comfortable starting this medication. Will follow upcoming labs. Start duloxetine 20 mg/day.      RTC in 2 months for nerve pain and routine foot care.

## 2024-02-20 ENCOUNTER — DOCUMENTATION ONLY (OUTPATIENT)
Dept: REHABILITATION | Facility: HOSPITAL | Age: 74
End: 2024-02-20

## 2024-02-20 ENCOUNTER — CLINICAL SUPPORT (OUTPATIENT)
Dept: REHABILITATION | Facility: HOSPITAL | Age: 74
End: 2024-02-20
Payer: MEDICARE

## 2024-02-20 DIAGNOSIS — R29.3 POOR POSTURE: ICD-10-CM

## 2024-02-20 DIAGNOSIS — M25.69 DECREASED RANGE OF MOTION OF TRUNK AND BACK: Primary | ICD-10-CM

## 2024-02-20 DIAGNOSIS — R29.898 DECREASED STRENGTH OF TRUNK AND BACK: ICD-10-CM

## 2024-02-20 PROCEDURE — 97112 NEUROMUSCULAR REEDUCATION: CPT | Mod: CQ

## 2024-02-20 PROCEDURE — 97110 THERAPEUTIC EXERCISES: CPT | Mod: CQ

## 2024-02-20 NOTE — PROGRESS NOTES
[facility]  Physical Therapy Treatment Note     Name: Vivienne Jose  Clinic Number: [id number]    Therapy Diagnosis:   Encounter Diagnoses   Name Primary?    Decreased range of motion of trunk and back Yes    Decreased strength of trunk and back     Poor posture      Physician: Deja Feldman, *    Visit Date: 2/20/2024    Physician Orders: PT Eval and Treat  Medical Diagnosis from Referral: M47.816 (ICD-10-CM) - Spondylosis of lumbar region without myelopathy or radiculopathy   Evaluation Date: 12/7/2023  Authorization Period Expiration: 12/31/2024  Reassessment Due: 2/29/2024  Plan of Care Certification Period: 3/7/2024  Visit #/Visits authorized: 14/20   Testing:MedX testing visit 2    Time In: 7:45 AM  Time Out: 8:50 AM  Total Billable Time: 60 minutes  INSURANCE and OUTCOMES: Fee for Service with FOTO Outcomes 2/3     Precautions: standard, diabetes, Hx of R TKA     Pattern of pain determined: 2    Subjective   Vivienne nicolas reports minimal back stiffness without c/o pain currently. States that she hasn't kept up with her HEP as much as she should.    Patient reports tolerating previous visit ; Muscular soreness  Patient reports their pain to be 0/10 on a 0-10 scale with 0 being no pain and 10 being the worst pain imaginable.  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   Pt goals: improve standing and walking tolerance, walk for exercise     Objective      MOVEMENT LOSS - Lumbar    Norms ROM Loss Initial ROM 1/30/24   Flexion Fingers touch toes, sacral angle >/= 70 deg, uniform spinal curvature, posterior weight shift  minimal loss Within functional limits   Extension ASIS surpasses toes, spine of scapulae surpasses heels, uniform spinal curve moderate loss moderate loss   Side glide Right   minimal loss minimal loss   Side glide Left   minimal loss minimal loss   Rotation Right PT observes contralateral  shoulder moderate loss minimal loss   Rotation Left PT observes contralateral shoulder moderate loss minimal loss      Lumbar  Isometric Testing on Med X equipment: Testing administered by PT    Test Initial Baseline Midpoint Final   Date 12/14/23 1/25/24    ROM 0-42 deg 0-48    Max Peak Torque 142  203    Min Peak Torque 71  78    Flex/Ext Ratio 2:1 2.3/1    % below normative data 9 48% above    % gain from initial test Not available visit 1 27% gain    Pt pushing through B LE during testing initial      Outcomes:  Intake Score: 51% functional ability  Visit 6 Score: 53%  Visit 10 Score: 53%  Discharge Score:  Goal Score: 60% functional ability          Treatment     Vivienne received the treatments listed below:      Vivienne received neuromuscular education  to isolate and engage spinal stabilization musculature correctly for motor control and coordination to aid in function and posture for 10 minutes on the Medical [employer] Machine.  Patient performed MedX dynamic exercise with emphasis on spinal muscular control using pacer throughout  active range of motion. Therapist assisted patient in achieving optimal exertion for neural reeducation and endurance training by using the  Naga Exertion Rating scale, by instructing the patient to aim for mid range of exertion, performing 15-20 repetitions, slowly, correctly,and safely.        2/20/2024     8:08 AM   [facility] - Short   Visit Number 14   VAS Pain Rating 0   Time 5   Lumbar Stretches - Slouch 10   Extension in Standing 10   Flexion in Lying 10   Lumbar Weight 73 lbs   Repetitions 20   Rating of Perceived Exertion 3       Vivienne participated in therapeutic exercises to develop strength, ROM, and core stabilization for 50 minutes including:    LTR x10  Hip ER stretch 3 x 10 sec  DKTC  w/T-ball support x 10  Supine PPT 5 sec hold x10-NP  TrA activation (T-ball) + SLR 2# x 15  BKFO BTB x10  Bridges +BTB x20  SOC x10  seated thoracic extension w/1/2 foam roll x  10  EIS x10  +Standing Open book x 10  +Paloff press with 10# x 10      Peripheral muscle strengthening which included one set of 15-20 repetitions at a slow and controlled 10-13 second per rep pace focused on strengthening supporting musculature in order to improve body mechanics and functional mobility. Patient and therapist focused on proper form during treatment to ensure optimal strengthening of each targeted muscle group.  Machines utilized included: Torso rotation, leg press, hip abd/add and leg extension Leg curls, bicep curls, tricep extension, chest press (Tband) and rows.     Therapeutic activity for 00 min     Vivienne received manual therapy techniques for 00  minutes. The following activities were included:    Pt given cold pack for 5 minutes to low back     Patient Education and Home Exercises   Home exercises include: PPT, bridges, clamshells, seated flexion   Cardio program (V5): - 1/9/24  Lifting education (V11): -  1/30/24  Posture/Lumbar roll: discussed chair ergonomics at length 1/25/24 and suggested pictures brought in  Fridge Magnet Discharge handout (date given): -  Equipment at home/gym membership: [employer]    Education provided:   - Cues with exercises  - MedX performance  - Precor ex performance.   - correct mechanisms of lifting 1/30/24    Written Home Exercises Provided: Patient instructed to cont prior HEP.  Exercises were reviewed and Vivienne was able to demonstrate them prior to the end of the session.  Vivienne demonstrated fair understanding of the education provided.     See EMR under Patient Instructions for exercises provided prior visit.    Assessment   Vivienne returns with minimal stiffness without c/o back pain currently. Treatment continued with flexibility, strengthening and neuromuscular reeducation ex's. Added Paloff press for additional core stability/strengthening and open book stretch in standing for mobility. She was able to perform ex's with min cues and without  increased pain. Lumbar MedX resistance was maintained at 73 ft/lbs and she completed 20 reps with RPE = 3/10. She was also able to complete peripheral strengthening exercises without increased pain.  Will continue to progress treatment per HB protocol and pt's tolerance.     Patient is making progress towards established goals.  Pt will continue to benefit from skilled outpatient physical therapy to address the deficits stated in the impairment chart, provide pt/family education and to maximize pt's level of independence in the home and community environment.     Anticipated Barriers for therapy: none  Pt's spiritual, cultural and educational needs considered and pt agreeable to plan of care and goals as stated below:     Goals:   Short term goals:  6 weeks or 10 visits   - Pt will demonstrate increased lumbar MedX ROM by at least 3 degrees from the initial ROM value with improvements noted in functional ROM and ability to perform ADLs. (MET 1/25/24)  - Pt will demonstrate increased MedX average isometric strength value by 20% from initial test resulting in improved ability to perform bending, lifting, and carrying activities safely, confidently. (MET 1/25/24)  - Pt will report a reduction in worst pain score by 2-3 points for improved tolerance for standing. Appropriate and Ongoing  - Pt able to perform HEP correctly with minimal cueing or supervision from therapist to encourage independent management of symptoms. Appropriate and Ongoing     Long term goals: 10 weeks or 20 visits   - Pt will demonstrate increased lumbar MedX ROM by at least 9 degrees from initial ROM value, resulting in improved ability to perform functional forward bending while standing and sitting. Appropriate and Ongoing  - Pt will demonstrate increased MedX average isometric strength value by 40% from initial test resulting in improved ability to perform bending, lifting, and carrying activities safely and confidently. Appropriate and  Ongoing  - Pt to demonstrate ability to independently control and reduce their pain through posture positioning and mechanical movements throughout a typical day. Appropriate and Ongoing  - Pt will demonstrate reduced pain and improved functional outcomes as reported on the FOTO by reaching an intake score of >/= 60% functional ability in order to demonstrate subjective improvement in patient's condition. . Appropriate and Ongoing  - Pt will demonstrate independence with the HEP at discharge. Appropriate and Ongoing  - Pt will be able to stand at least 30 minutes while doing household tasks. (patient goal) Appropriate and Ongoing    Plan   Continue with established Plan of Care towards established PT goals.     Therapist: Luis Fernando Venegas, PTA  2/20/2024

## 2024-02-20 NOTE — PROGRESS NOTES
PT/PTA met face to face to discuss pt's treatment plan and progress towards established goals. Pt will be seen by a physical therapist minimally every 6th visit or every 30 days.    Luis Fernando Venegas PTA

## 2024-02-22 ENCOUNTER — CLINICAL SUPPORT (OUTPATIENT)
Dept: REHABILITATION | Facility: HOSPITAL | Age: 74
End: 2024-02-22
Payer: MEDICARE

## 2024-02-22 DIAGNOSIS — M25.69 DECREASED RANGE OF MOTION OF TRUNK AND BACK: Primary | ICD-10-CM

## 2024-02-22 DIAGNOSIS — R29.3 POOR POSTURE: ICD-10-CM

## 2024-02-22 DIAGNOSIS — R29.898 DECREASED STRENGTH OF TRUNK AND BACK: ICD-10-CM

## 2024-02-22 PROCEDURE — 97112 NEUROMUSCULAR REEDUCATION: CPT

## 2024-02-22 PROCEDURE — 97110 THERAPEUTIC EXERCISES: CPT

## 2024-02-22 NOTE — PROGRESS NOTES
[facility]  Physical Therapy Treatment Note     Name: Vivienne Jose  Clinic Number: [id number]    Therapy Diagnosis:   Encounter Diagnoses   Name Primary?    Decreased range of motion of trunk and back Yes    Decreased strength of trunk and back     Poor posture        Physician: Deja Feldman, *    Visit Date: 2/22/2024    Physician Orders: PT Eval and Treat  Medical Diagnosis from Referral: M47.816 (ICD-10-CM) - Spondylosis of lumbar region without myelopathy or radiculopathy   Evaluation Date: 12/7/2023  Authorization Period Expiration: 12/31/2024  Reassessment Due: 2/29/2024  Plan of Care Certification Period: 3/7/2024  Visit #/Visits authorized: 15/20   Testing:MedX testing visit 2    Time In: 7:50 AM  Time Out: 9:00 AM  Total Billable Time: 65 minutes  INSURANCE and OUTCOMES: Fee for Service with FOTO Outcomes 2/3     Precautions: standard, diabetes, Hx of R TKA     Pattern of pain determined: 2    Subjective   Vivienne reports she is a little bit sore from last therapy session, but otherwise has no pain. She finds it easier to walk from the parking lot into the clinic now, and doesn't need to take a break to sit on a bench. She has trouble doing her HEP because her bed is too soft.     Patient reports tolerating previous visit ; Muscular soreness  Patient reports their pain to be 0/10 on a 0-10 scale with 0 being no pain and 10 being the worst pain imaginable.  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   Pt goals: improve standing and walking tolerance, walk for exercise     Objective      MOVEMENT LOSS - Lumbar    Norms ROM Loss Initial ROM 1/30/24   Flexion Fingers touch toes, sacral angle >/= 70 deg, uniform spinal curvature, posterior weight shift  minimal loss Within functional limits   Extension ASIS surpasses toes, spine of scapulae surpasses heels, uniform spinal curve moderate loss moderate  loss   Side glide Right   minimal loss minimal loss   Side glide Left   minimal loss minimal loss   Rotation Right PT observes contralateral shoulder moderate loss minimal loss   Rotation Left PT observes contralateral shoulder moderate loss minimal loss      Lumbar  Isometric Testing on Med X equipment: Testing administered by PT    Test Initial Baseline Midpoint Final   Date 12/14/23 1/25/24    ROM 0-42 deg 0-48    Max Peak Torque 142  203    Min Peak Torque 71  78    Flex/Ext Ratio 2:1 2.3/1    % below normative data 9 48% above    % gain from initial test Not available visit 1 27% gain    Pt pushing through B LE during testing initial      Outcomes:  Intake Score: 51% functional ability  Visit 6 Score: 53%  Visit 10 Score: 53%  Discharge Score:  Goal Score: 60% functional ability          Treatment     Vivienne received the treatments listed below:      Vivienne received neuromuscular education  to isolate and engage spinal stabilization musculature correctly for motor control and coordination to aid in function and posture for 10 minutes on the Medical Medx Machine.  Patient performed MedX dynamic exercise with emphasis on spinal muscular control using pacer throughout  active range of motion. Therapist assisted patient in achieving optimal exertion for neural reeducation and endurance training by using the  Naga Exertion Rating scale, by instructing the patient to aim for mid range of exertion, performing 15-20 repetitions, slowly, correctly,and safely.        2/22/2024     7:43 AM   [facility]   Visit Number 15   VAS Pain Rating 0   Time 5   Lumbar Stretches - Slouch Overcorrection 10   Extension in Standing 10   Flexion in Lying 10   Lumbar Weight 80 lbs   Repetitions 18   Rating of Perceived Exertion 4   Ice - Sitting 5 mins.      Vivienne participated in therapeutic exercises to develop strength, ROM, and core stabilization for 55 minutes including:    LTR x10  Hip ER stretch 3 x 10 sec   DKTC  w/T-ball  support x 10  Supine PPT 5 sec hold x10-NP  TrA activation (T-ball) + SLR 2# x 15 - NP  BKFO BTB x10  Bridges +BTB x20  SOC x10  seated thoracic extension w/1/2 foam roll x 10  EIS x10  Standing Open book x 10  Paloff press with 10# x 10      Peripheral muscle strengthening which included one set of 15-20 repetitions at a slow and controlled 10-13 second per rep pace focused on strengthening supporting musculature in order to improve body mechanics and functional mobility. Patient and therapist focused on proper form during treatment to ensure optimal strengthening of each targeted muscle group.  Machines utilized included: Torso rotation, leg press, hip abd/add and leg extension Leg curls, bicep curls, tricep extension, chest press (Tband) and rows.     Therapeutic activity for 00 min     Vivienne received manual therapy techniques for 00  minutes. The following activities were included:    Pt given cold pack for 5 minutes to low back     Patient Education and Home Exercises   Home exercises include: 2/22/24 - Mini-squats, slouch overcorrect, Paloff press + BTB  Cardio program (V5): - 1/9/24  Lifting education (V11): -  1/30/24  Posture/Lumbar roll: discussed chair ergonomics at length 1/25/24 and suggested pictures brought in  Fridge Magnet Discharge handout (date given): -  Equipment at home/gym membership: [employer]    Education provided:   - Cues with exercises  - MedX performance  - Precor ex performance.   - correct mechanisms of lifting 1/30/24    Written Home Exercises Provided: yes.  See Patient Instructions for updated HEP. Exercises were reviewed and Vivienne was able to demonstrate them prior to the end of the session.  Vivienne demonstrated fair understanding of the education provided.     See EMR under Patient Instructions for exercises provided prior visit.    Assessment   Vivienne presents to therapy today reporting mild soreness following last therapy session, but no pain. Increased resistance by 10%  today on Lumbar MedX to 80lbs. She was able to complete 18 repetitions with an exertion rating of 4/10. She noted that her HEP exercises are difficult for her to perform at home due to the softness of her mattress, so she was provided with new HEP consisting of exercises in standing and sitting, including mini squats, Paloff press with blue theraband, and slouch overcorrect. She verbalized understanding and was able to demonstrate new exercises prior to end of session today. Overall she tolerated treatment well, with no increase in symptoms. PT to continue to progress Healthy [facility] program as tolerated.     Patient is making progress towards established goals.  Pt will continue to benefit from skilled outpatient physical therapy to address the deficits stated in the impairment chart, provide pt/family education and to maximize pt's level of independence in the home and community environment.     Anticipated Barriers for therapy: none  Pt's spiritual, cultural and educational needs considered and pt agreeable to plan of care and goals as stated below:     Goals:   Short term goals:  6 weeks or 10 visits   - Pt will demonstrate increased lumbar MedX ROM by at least 3 degrees from the initial ROM value with improvements noted in functional ROM and ability to perform ADLs. (MET 1/25/24)  - Pt will demonstrate increased MedX average isometric strength value by 20% from initial test resulting in improved ability to perform bending, lifting, and carrying activities safely, confidently. (MET 1/25/24)  - Pt will report a reduction in worst pain score by 2-3 points for improved tolerance for standing. Appropriate and Ongoing  - Pt able to perform HEP correctly with minimal cueing or supervision from therapist to encourage independent management of symptoms. Appropriate and Ongoing     Long term goals: 10 weeks or 20 visits   - Pt will demonstrate increased lumbar MedX ROM by at least 9 degrees from initial ROM value, resulting  in improved ability to perform functional forward bending while standing and sitting. Appropriate and Ongoing  - Pt will demonstrate increased MedX average isometric strength value by 40% from initial test resulting in improved ability to perform bending, lifting, and carrying activities safely and confidently. Appropriate and Ongoing  - Pt to demonstrate ability to independently control and reduce their pain through posture positioning and mechanical movements throughout a typical day. Appropriate and Ongoing  - Pt will demonstrate reduced pain and improved functional outcomes as reported on the FOTO by reaching an intake score of >/= 60% functional ability in order to demonstrate subjective improvement in patient's condition. . Appropriate and Ongoing  - Pt will demonstrate independence with the HEP at discharge. Appropriate and Ongoing  - Pt will be able to stand at least 30 minutes while doing household tasks. (patient goal) Appropriate and Ongoing    Plan   Continue with established Plan of Care towards established PT goals.     Therapist: Caterina Antoine, PT  Co-treated with Ariana Clifford PT  2/22/2024

## 2024-02-26 NOTE — PROGRESS NOTES
[facility]  Physical Therapy Treatment Note     Name: Vivienne Jose  Clinic Number: [id number]    Therapy Diagnosis:   Encounter Diagnoses   Name Primary?    Decreased range of motion of trunk and back Yes    Decreased strength of trunk and back     Poor posture        Physician: Deja Feldman, *    Visit Date: 2/27/2024    Physician Orders: PT Eval and Treat  Medical Diagnosis from Referral: M47.816 (ICD-10-CM) - Spondylosis of lumbar region without myelopathy or radiculopathy   Evaluation Date: 12/7/2023  Authorization Period Expiration: 12/31/2024  Reassessment Due: 3/27/24  Plan of Care Certification Period: 3/7/2024  Visit #/Visits authorized: 16/20   Testing:MedX testing visit 2    Time In: 8:24 am  Time Out: 9:26 am  Total Billable Time: 60 minutes  INSURANCE and OUTCOMES: Fee for Service with FOTO Outcomes 2/3     Precautions: standard, diabetes, Hx of R TKA     Pattern of pain determined: 2    Subjective   Vivienne reports she is a doing well. She can do more before she has to sit.  She finds it easier to walk from the parking lot into the clinic now, and doesn't need to take a break to sit on a bench. She has trouble doing her HEP because her bed is too soft.   She is not doing cardio.  She says she is lazy.  She could find a way if she was motivated.    She may try and walk with fit bits in the yard before next visit.  She is a [employer] member, doesn't go. Revisit goals and HEP for home.  We discussed wellness.  She thinks maybe she might want to go back to [facility].  Continue to revisit to assist with HEP at discharge    Patient reports tolerating previous visit ; Muscular soreness  Patient reports their pain to be 0/10 on a 0-10 scale with 0 being no pain and 10 being the worst pain imaginable.  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   Pt goals: improve standing and walking tolerance, walk for exercise      Objective      MOVEMENT LOSS - Lumbar    Norms ROM Loss Initial ROM 2/27/24   Flexion Fingers touch toes, sacral angle >/= 70 deg, uniform spinal curvature, posterior weight shift  minimal loss Within functional limits   Extension ASIS surpasses toes, spine of scapulae surpasses heels, uniform spinal curve moderate loss moderate loss   Side glide Right   minimal loss minimal loss   Side glide Left   minimal loss minimal loss   Rotation Right PT observes contralateral shoulder moderate loss minimal loss   Rotation Left PT observes contralateral shoulder moderate loss minimal loss      Lumbar  Isometric Testing on Med X equipment: Testing administered by PT    Test Initial Baseline Midpoint Final   Date 12/14/23 1/25/24    ROM 0-42 deg 0-48    Max Peak Torque 142  203    Min Peak Torque 71  78    Flex/Ext Ratio 2:1 2.3/1    % below normative data 9 48% above    % gain from initial test Not available visit 1 27% gain    Pt pushing through B LE during testing initial      Outcomes:  Intake Score: 51% functional ability  Visit 6 Score: 53%  Visit 10 Score: 53%  Discharge Score:  Goal Score: 60% functional ability          Treatment     Vivienne received the treatments listed below:      Vivienne received neuromuscular education  to isolate and engage spinal stabilization musculature correctly for motor control and coordination to aid in function and posture for 10 minutes on the Medical Medx Machine.  Patient performed MedX dynamic exercise with emphasis on spinal muscular control using pacer throughout  active range of motion. Therapist assisted patient in achieving optimal exertion for neural reeducation and endurance training by using the  Naga Exertion Rating scale, by instructing the patient to aim for mid range of exertion, performing 15-20 repetitions, slowly, correctly,and safely.        2/27/2024     9:07 AM   [facility]   Visit Number 16   VAS Pain Rating 0   Time 5   Lumbar Stretches - Slouch  Overcorrection 10   Extension in Standing 10   Flexion in Lying 10   Lumbar Weight 80 lbs   Repetitions 18   Rating of Perceived Exertion 4   Ice - Sitting 5 mins.         Vivienne participated in therapeutic exercises to develop strength, ROM, and core stabilization for 50 minutes including:    LTR x10  Hip ER stretch 3 x 10 sec   DKTC  w/T-ball support x 10  Supine PPT 5 sec hold x10-NP  TrA activation (T-ball) + SLR 2# x 15 - NP  BKFO BTB x10  Bridges +BTB x20  SOC x10  seated thoracic extension w/1/2 foam roll x 10  EIS x10  Standing Open book x 10  Paloff press with 10# x 10      Peripheral muscle strengthening which included one set of 15-20 repetitions at a slow and controlled 10-13 second per rep pace focused on strengthening supporting musculature in order to improve body mechanics and functional mobility. Patient and therapist focused on proper form during treatment to ensure optimal strengthening of each targeted muscle group.  Machines utilized included: Torso rotation, leg press, hip abd/add and leg extension Leg curls, bicep curls, tricep extension, chest press (Tband) and rows.     Therapeutic activity for 00 min     Vivienne received manual therapy techniques for 00  minutes. The following activities were included:    Pt given cold pack for 5 minutes to low back     Patient Education and Home Exercises   Home exercises include: 2/22/24 - Mini-squats, slouch overcorrect, Paloff press + BTB, LTR, ext in standing  Cardio program (V5): - revisited 2/27/24 as she is not doing, encouraged walking at home with fitbit  Lifting education (V11): -  1/30/24  Posture/Lumbar roll: discussed chair ergonomics at length 1/25/24 and suggested pictures brought in  Fridge Magnet Discharge handout (date given): -  Equipment at home/gym membership: [employer]    Education provided:   - Cues with exercises  - MedX performance  - Precor ex performance.   - reviewed cardio as walking is goal, and encouraged walking at home  with fitbit    Written Home Exercises Provided: yes.  See Patient Instructions for updated HEP. Exercises were reviewed and Vivienne was able to demonstrate them prior to the end of the session.  Vivienne demonstrated fair understanding of the education provided.     See EMR under Patient Instructions for exercises provided prior visit.    Assessment   Vivienne presents to therapy today with no symptoms at rest, noting she can do more before she needs to rest.  Walking is still a goal and she hasn't done cardio, revisited this and goal setting, and trying walking at home with fitbit.  She is also [employer] member but not motivated to go there at this time.  Maintained Lumbar resistance at 80 ft lbs. She was able to complete 18 repetitions with an exertion rating of 4/10.  We maintained several peripheral weights that she did 20 of last visit and she did 20 again, noting as she is getting to visit 16, we are increasing weights at a slower rate.   She noted that her HEP exercises are difficult for her to perform at home due to the softness of her mattress, so she was provided with new HEP consisting of exercises in standing and sitting, including mini squats, Paloff press with blue theraband, and slouch overcorrect last visit.  Reviewed and encouraged Ext in standing, and thoracic ext in chair as well.  She verbalized understanding and was able to demonstrate new exercises prior to end of session today. Overall she tolerated treatment well, with no increase in symptoms. PT to continue to progress Healthy [facility] program as tolerated.   Revisit goals and HEP for home.  We discussed wellness.  She thinks maybe she might want to go back to [facility].  Continue to revisit.    Patient is making progress towards established goals.  Pt will continue to benefit from skilled outpatient physical therapy to address the deficits stated in the impairment chart, provide pt/family education and to maximize pt's level of independence in the home and  community environment.     Anticipated Barriers for therapy: none  Pt's spiritual, cultural and educational needs considered and pt agreeable to plan of care and goals as stated below:     Goals:   Short term goals:  6 weeks or 10 visits   - Pt will demonstrate increased lumbar MedX ROM by at least 3 degrees from the initial ROM value with improvements noted in functional ROM and ability to perform ADLs. (MET 1/25/24)  - Pt will demonstrate increased MedX average isometric strength value by 20% from initial test resulting in improved ability to perform bending, lifting, and carrying activities safely, confidently. (MET 1/25/24)  - Pt will report a reduction in worst pain score by 2-3 points for improved tolerance for standing. Appropriate and Ongoing  - Pt able to perform HEP correctly with minimal cueing or supervision from therapist to encourage independent management of symptoms. Appropriate and Ongoing     Long term goals: 10 weeks or 20 visits   - Pt will demonstrate increased lumbar MedX ROM by at least 9 degrees from initial ROM value, resulting in improved ability to perform functional forward bending while standing and sitting. Appropriate and Ongoing  - Pt will demonstrate increased MedX average isometric strength value by 40% from initial test resulting in improved ability to perform bending, lifting, and carrying activities safely and confidently. Appropriate and Ongoing  - Pt to demonstrate ability to independently control and reduce their pain through posture positioning and mechanical movements throughout a typical day. Appropriate and Ongoing  - Pt will demonstrate reduced pain and improved functional outcomes as reported on the FOTO by reaching an intake score of >/= 60% functional ability in order to demonstrate subjective improvement in patient's condition. . Appropriate and Ongoing  - Pt will demonstrate independence with the HEP at discharge. Appropriate and Ongoing  - Pt will be able to stand  at least 30 minutes while doing household tasks. (patient goal) Appropriate and Ongoing    Plan   Continue with established Plan of Care towards established PT goals.     Therapist: Sweetie Leon PT

## 2024-02-27 ENCOUNTER — CLINICAL SUPPORT (OUTPATIENT)
Dept: REHABILITATION | Facility: HOSPITAL | Age: 74
End: 2024-02-27
Payer: MEDICARE

## 2024-02-27 DIAGNOSIS — R29.898 DECREASED STRENGTH OF TRUNK AND BACK: ICD-10-CM

## 2024-02-27 DIAGNOSIS — R29.3 POOR POSTURE: ICD-10-CM

## 2024-02-27 DIAGNOSIS — M25.69 DECREASED RANGE OF MOTION OF TRUNK AND BACK: Primary | ICD-10-CM

## 2024-02-27 PROCEDURE — 97112 NEUROMUSCULAR REEDUCATION: CPT | Performed by: PHYSICAL MEDICINE & REHABILITATION

## 2024-02-27 PROCEDURE — 97110 THERAPEUTIC EXERCISES: CPT | Performed by: PHYSICAL MEDICINE & REHABILITATION

## 2024-02-29 ENCOUNTER — CLINICAL SUPPORT (OUTPATIENT)
Dept: REHABILITATION | Facility: HOSPITAL | Age: 74
End: 2024-02-29
Payer: MEDICARE

## 2024-02-29 DIAGNOSIS — R29.3 POOR POSTURE: ICD-10-CM

## 2024-02-29 DIAGNOSIS — M25.69 DECREASED RANGE OF MOTION OF TRUNK AND BACK: Primary | ICD-10-CM

## 2024-02-29 DIAGNOSIS — R29.898 DECREASED STRENGTH OF TRUNK AND BACK: ICD-10-CM

## 2024-02-29 PROCEDURE — 97112 NEUROMUSCULAR REEDUCATION: CPT | Mod: CQ

## 2024-02-29 PROCEDURE — 97110 THERAPEUTIC EXERCISES: CPT | Mod: CQ

## 2024-02-29 NOTE — PROGRESS NOTES
[facility]  Physical Therapy Treatment Note     Name: Vivienne Jose  Clinic Number: [id number]    Therapy Diagnosis:   Encounter Diagnoses   Name Primary?    Decreased range of motion of trunk and back Yes    Decreased strength of trunk and back     Poor posture        Physician: Deja Feldman, *    Visit Date: 2/29/2024    Physician Orders: PT Eval and Treat  Medical Diagnosis from Referral: M47.816 (ICD-10-CM) - Spondylosis of lumbar region without myelopathy or radiculopathy   Evaluation Date: 12/7/2023  Authorization Period Expiration: 12/31/2024  Reassessment Due: 3/27/24  Plan of Care Certification Period: 3/7/2024  Visit #/Visits authorized: 17/20   Testing:MedX testing visit 2    Time In: 7:50 am  Time Out: 8:55 am  Total Billable Time: 60 minutes  INSURANCE and OUTCOMES: Fee for Service with FOTO Outcomes 2/3     Precautions: standard, diabetes, Hx of R TKA     Pattern of pain determined: 2    Subjective   Vivienne reports some increased generalized stiffness and fatigue this morning possibly due to the colder weather change this morning. Min R Knee discomfort. States that she performed some walking activities yesterday while wearing her own fitbit and also her 's simultaneously which indicated a small difference in # of steps.    Patient reports tolerating previous visit ; Muscular soreness  Patient reports their pain to be 0/10 on a 0-10 scale with 0 being no pain and 10 being the worst pain imaginable.  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   Pt goals: improve standing and walking tolerance, walk for exercise     Objective      MOVEMENT LOSS - Lumbar    Norms ROM Loss Initial ROM 2/27/24   Flexion Fingers touch toes, sacral angle >/= 70 deg, uniform spinal curvature, posterior weight shift  minimal loss Within functional limits   Extension ASIS surpasses toes, spine of scapulae surpasses  heels, uniform spinal curve moderate loss moderate loss   Side glide Right   minimal loss minimal loss   Side glide Left   minimal loss minimal loss   Rotation Right PT observes contralateral shoulder moderate loss minimal loss   Rotation Left PT observes contralateral shoulder moderate loss minimal loss      Lumbar  Isometric Testing on Med X equipment: Testing administered by PT    Test Initial Baseline Midpoint Final   Date 12/14/23 1/25/24    ROM 0-42 deg 0-48    Max Peak Torque 142  203    Min Peak Torque 71  78    Flex/Ext Ratio 2:1 2.3/1    % below normative data 9 48% above    % gain from initial test Not available visit 1 27% gain    Pt pushing through B LE during testing initial      Outcomes:  Intake Score: 51% functional ability  Visit 6 Score: 53%  Visit 10 Score: 53%  Discharge Score:  Goal Score: 60% functional ability          Treatment     Vivienne received the treatments listed below:      Vivienne received neuromuscular education  to isolate and engage spinal stabilization musculature correctly for motor control and coordination to aid in function and posture for 10 minutes on the Medical Medx Machine.  Patient performed MedX dynamic exercise with emphasis on spinal muscular control using pacer throughout  active range of motion. Therapist assisted patient in achieving optimal exertion for neural reeducation and endurance training by using the  Naga Exertion Rating scale, by instructing the patient to aim for mid range of exertion, performing 15-20 repetitions, slowly, correctly,and safely.        2/29/2024     7:53 AM   [facility] - Short   Visit Number 17   VAS Pain Rating 0   Time 5   Lumbar Stretches - Slouch 10   Extension in Standing 10   Flexion in Lying 10   Lumbar Weight 80 lbs   Repetitions 20   Rating of Perceived Exertion 4       Vivienne participated in therapeutic exercises to develop strength, ROM, and core stabilization for 50 minutes including:    LTR x10  Hip ER stretch 3 x 10  sec   DKTC  w/T-ball support x 10  Supine PPT 5 sec hold x10-NP  TrA activation (T-ball) + SLR 2# x 15--NP  BKFO BTB x10  Bridges +BTB x20  SOC x10  seated thoracic extension w/1/2 foam roll x 10  EIS x10  Standing Open book x 10  Paloff press with 10# x 15  +Sit<->Stand from chair x 10 without UE assist  +Standing ex's with support   Marching in place x 10   Hip abd x 10    Hip ext x 10    Peripheral muscle strengthening which included one set of 15-20 repetitions at a slow and controlled 10-13 second per rep pace focused on strengthening supporting musculature in order to improve body mechanics and functional mobility. Patient and therapist focused on proper form during treatment to ensure optimal strengthening of each targeted muscle group.  Machines utilized included: Torso rotation, leg press, hip abd/add and leg extension Leg curls, bicep curls, tricep extension, chest press (Tband) and rows.       Vivienne received manual therapy techniques for 00  minutes. The following activities were included:    Pt given cold pack for 5 minutes to low back     Patient Education and Home Exercises   Home exercises include: 2/22/24 - Mini-squats, slouch overcorrect, Paloff press + BTB, LTR, ext in standing  Cardio program (V5): - revisited 2/27/24 as she is not doing, encouraged walking at home with fitbit  Lifting education (V11): -  1/30/24  Posture/Lumbar roll: discussed chair ergonomics at length 1/25/24 and suggested pictures brought in  Fridge Magnet Discharge handout (date given): -  Equipment at [facility]/gym [facility] [employer]    Education provided:   - Cues with exercises  - MedX performance  - Precor ex performance.   - reviewed cardio as walking is goal, and encouraged walking at home with fitbit    Written Home Exercises Provided: yes.Added standing ex's with UE support: Marching, hip abd, hip extension along with functional sit<->stand strengthening. (Provided with copy-unable to copy to EMR patient  instructions)  See Patient Instructions for updated HEP. Exercises were reviewed and Vivienne was able to demonstrate them prior to the end of the session.  Vivienne demonstrated fair understanding of the education provided.     See EMR under Patient Instructions for exercises provided prior visit.    Assessment   Vivienne returns with minimal stiffness and fatigue today. Treatment continued with flexibility, strengthening and neuromuscular reeducation ex's. Added additional standing ex's for functional strengthening to include:repeated sit<->stand, Marching, hip abd and extension w/UE support. She was able to perform ex's with min cues and without increased pain. Lumbar MedX resistance was maintained at 80 ft/lbs and she completed 20 reps with RPE = 4/10. She was also able to complete peripheral strengthening exercises without increased pain and actually had reported lower RPE's on most ex's despite c/o fatigue. Will continue to progress treatment per HB protocol and pt's tolerance.     Patient is making progress towards established goals.  Pt will continue to benefit from skilled outpatient physical therapy to address the deficits stated in the impairment chart, provide pt/family education and to maximize pt's level of independence in the home and community environment.     Anticipated Barriers for therapy: none  Pt's spiritual, cultural and educational needs considered and pt agreeable to plan of care and goals as stated below:     Goals:   Short term goals:  6 weeks or 10 visits   - Pt will demonstrate increased lumbar MedX ROM by at least 3 degrees from the initial ROM value with improvements noted in functional ROM and ability to perform ADLs. (MET 1/25/24)  - Pt will demonstrate increased MedX average isometric strength value by 20% from initial test resulting in improved ability to perform bending, lifting, and carrying activities safely, confidently. (MET 1/25/24)  - Pt will report a reduction in worst pain score by  2-3 points for improved tolerance for standing. Appropriate and Ongoing  - Pt able to perform HEP correctly with minimal cueing or supervision from therapist to encourage independent management of symptoms. Appropriate and Ongoing     Long term goals: 10 weeks or 20 visits   - Pt will demonstrate increased lumbar MedX ROM by at least 9 degrees from initial ROM value, resulting in improved ability to perform functional forward bending while standing and sitting. Appropriate and Ongoing  - Pt will demonstrate increased MedX average isometric strength value by 40% from initial test resulting in improved ability to perform bending, lifting, and carrying activities safely and confidently. Appropriate and Ongoing  - Pt to demonstrate ability to independently control and reduce their pain through posture positioning and mechanical movements throughout a typical day. Appropriate and Ongoing  - Pt will demonstrate reduced pain and improved functional outcomes as reported on the FOTO by reaching an intake score of >/= 60% functional ability in order to demonstrate subjective improvement in patient's condition. . Appropriate and Ongoing  - Pt will demonstrate independence with the HEP at discharge. Appropriate and Ongoing  - Pt will be able to stand at least 30 minutes while doing household tasks. (patient goal) Appropriate and Ongoing    Plan   Continue with established Plan of Care towards established PT goals.     Therapist: Luis Fernando Venegas, PTA

## 2024-03-04 ENCOUNTER — CLINICAL SUPPORT (OUTPATIENT)
Dept: ALLERGY | Facility: CLINIC | Age: 74
End: 2024-03-04
Payer: MEDICARE

## 2024-03-04 DIAGNOSIS — J30.9 CHRONIC ALLERGIC RHINITIS: Primary | ICD-10-CM

## 2024-03-04 PROCEDURE — 95115 IMMUNOTHERAPY ONE INJECTION: CPT | Mod: HCNC,S$GLB,, | Performed by: ALLERGY & IMMUNOLOGY

## 2024-03-04 NOTE — PROGRESS NOTES
[facility] - HEALTHY BACK  Physical Therapy Treatment Note     Name: Vivienne Jsoe  Clinic Number: [id number]    Therapy Diagnosis:   Encounter Diagnoses   Name Primary?    Decreased range of motion of trunk and back Yes    Decreased strength of trunk and back     Poor posture        Physician: Deja Feldman, *    Visit Date: 3/5/2024    Physician Orders: PT Eval and Treat  Medical Diagnosis from Referral: M47.816 (ICD-10-CM) - Spondylosis of lumbar region without myelopathy or radiculopathy   Evaluation Date: 12/7/2023  Authorization Period Expiration: 12/31/2024  Reassessment Due: 3/27/24  Plan of Care Certification Period: 3/7/2024 sent 3/5/24-4/22/24  Visit #/Visits authorized: 18/20   Testing:MedX testing visit 2    Time In: 8:20 am  Time Out:  9:20 am  Total Billable Time: 60 minutes  INSURANCE and OUTCOMES: Fee for Service with FOTO Outcomes 2/3     Precautions: standard, diabetes, Hx of R TKA     Pattern of pain determined: 2    Subjective   Vivienne reports she is doing fairly well.  She is more consistent with stretches in sitting and standing then lying down due to working, and caring for her mom.  She has hospice and nursing support during the day for her mom.  She just finds she doesn't have time to lie down and stretch.  Reviewed HEP and provided fridge magnet for her to try over her next few visits so we can collaborate with her on what she is successful with.  She has [employer] membership but just doesn't feel she can go at this time.   She doesn't think she will do wellness.  No pain today, some stiffness, better with stretching.    Patient reports tolerating previous visit ; Muscular soreness  Patient reports their pain to be 0/10 on a 0-10 scale with 0 being no pain and 10 being the worst pain imaginable.  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   Pt goals: improve standing and walking tolerance, walk for  exercise     Objective      MOVEMENT LOSS - Lumbar    Norms ROM Loss Initial ROM 3/5/24   Flexion Fingers touch toes, sacral angle >/= 70 deg, uniform spinal curvature, posterior weight shift  minimal loss Within functional limits   Extension ASIS surpasses toes, spine of scapulae surpasses heels, uniform spinal curve moderate loss min loss   Side glide Right   minimal loss minimal loss   Side glide Left   minimal loss minimal loss   Rotation Right PT observes contralateral shoulder moderate loss minimal loss   Rotation Left PT observes contralateral shoulder moderate loss minimal loss      Lumbar  Isometric Testing on Med X equipment: Testing administered by PT    Test Initial Baseline Midpoint Final   Date 12/14/23 1/25/24    ROM 0-42 deg 0-48    Max Peak Torque 142  203    Min Peak Torque 71  78    Flex/Ext Ratio 2:1 2.3/1    % below normative data 9 48% above    % gain from initial test Not available visit 1 27% gain    Pt pushing through B LE during testing initial      Outcomes:  Intake Score: 51% functional ability  Visit 6 Score: 53%  Visit 10 Score: 53%  Discharge Score:  Goal Score: 60% functional ability          Treatment     Vivienne received the treatments listed below:      Vivienne received neuromuscular education  to isolate and engage spinal stabilization musculature correctly for motor control and coordination to aid in function and posture for 10 minutes on the Medical Medx Machine.  Patient performed MedX dynamic exercise with emphasis on spinal muscular control using pacer throughout  active range of motion. Therapist assisted patient in achieving optimal exertion for neural reeducation and endurance training by using the  [place] Exertion Rating scale, by instructing the patient to aim for mid range of exertion, performing 15-20 repetitions, slowly, correctly,and safely.            3/5/2024     8:56 AM   [facility]   Visit Number 18   VAS Pain Rating 0   Time 5   Lumbar Stretches - Slouch  Overcorrection 10   Extension in Standing 10   Flexion in Lying 10   Lumbar Weight 84 lbs   Repetitions 15   Rating of Perceived Exertion 4   Ice - Sitting 5 mins.      Vivienne participated in therapeutic exercises to develop strength, ROM, and core stabilization for 50 minutes including:    LTR x10  Hip ER stretch 3 x 10 sec   DKTC  w/T-ball support x 10  Supine PPT 5 sec hold x10-NP  TrA activation (T-ball) + SLR 2# x 15--NP  BKFO BTB x10  Bridges +BTB x20  SOC x10  seated thoracic extension w/1/2 foam roll x 10  EIS x10  Standing Open book x 10  Paloff press with 10# x 15  +Sit<->Stand from chair x 10 without UE assist  +Standing ex's with support   Marching in place x 10   Hip abd x 10    Hip ext x 10  Reviewed and performed HEP:                                                MY EXERCISE PLAN  GO TO STRETCHES  1/day (like brushing your teeth) STRENGTHENING  2-3 times/week CARDIO PROGRAM     Lying down, roll legs side to side  Use towel to pull legs up to chest bridging walk   Sitting  -turn side to side  -slouch correct  -chest to dev (stretch back over chair  -hip stretch Tighten stomach and lift leg bike   Standing  -stretch back backwards  -march  -lift legs out to the side  -lift legs back behind you Sit and push back backwards into chair for 5 sec, do 10      Sit to stand         Peripheral muscle strengthening which included one set of 15-20 repetitions at a slow and controlled 10-13 second per rep pace focused on strengthening supporting musculature in order to improve body mechanics and functional mobility. Patient and therapist focused on proper form during treatment to ensure optimal strengthening of each targeted muscle group.  Machines utilized included: Torso rotation, leg press, hip abd/add and leg extension Leg curls, bicep curls, tricep extension, chest press (Tband) and rows.       Vivienne received manual therapy techniques for 00  minutes. The following activities were included:    Pt given cold  pack for 5 minutes to low back     Patient Education and Home Exercises   Home exercises include:                                                 MY EXERCISE PLAN  GO TO STRETCHES  1/day (like brushing your teeth) STRENGTHENING  2-3 times/week CARDIO PROGRAM     Lying down, roll legs side to side  Use towel to pull legs up to chest bridging walk   Sitting  -turn side to side  -slouch correct  -chest to dev (stretch back over chair  -hip stretch Tighten stomach and lift leg bike   Standing  -stretch back backwards  -march  -lift legs out to the side  -lift legs back behind you Sit and push back backwards into chair for 5 sec, do 10      Sit to stand         Cardio program (V5): - revisited 2/27/24 as she is not doing, encouraged walking at home with fitbit  Lifting education (V11): -  1/30/24  Posture/Lumbar roll: discussed chair ergonomics at length 1/25/24 and suggested pictures brought in  Fridge Magnet Discharge handout (date given): -  3/5/24, work with it over next few visits  Equipment at home/gym membership: [employer]    Education provided:   - Cues with exercises  - MedX performance  - Precor ex performance.   - reviewed cardio as walking is goal, and encouraged walking at home with fitbit - and fridge magnet discharge    Written Home Exercises Provided: yes.Added standing ex's with UE support: Marching, hip abd, hip extension along with functional sit<->stand strengthening. (Provided with copy-unable to copy to EMR patient instructions)  See Patient Instructions for updated HEP. Exercises were reviewed and Vivienne was able to demonstrate them prior to the end of the session.  Vivienne demonstrated fair understanding of the education provided.     See EMR under Patient Instructions for exercises provided prior visit.    Assessment   Vivienne returns with minimal stiffness and no pain  today. Treatment continued with flexibility, strengthening and neuromuscular reeducation ex's. Added fridge magnet with sitting  and standing exercises for her success with completion.  Review with her over next few visits and provide edits per what she is successful with.   She was able to perform ex's with min cues and without increased pain. Lumbar MedX resistance was increased to 84 ft/lbs and she completed 15 reps with RPE = 4/10. She was also able to complete peripheral strengthening exercises without increased pain and actually had reported lower RPE's on most ex's despite c/o fatigue. Will continue to progress treatment per HB protocol and pt's tolerance.     Patient is making progress towards established goals.  Pt will continue to benefit from skilled outpatient physical therapy to address the deficits stated in the impairment chart, provide pt/family education and to maximize pt's level of independence in the home and community environment.     Anticipated Barriers for therapy: none  Pt's spiritual, cultural and educational needs considered and pt agreeable to plan of care and goals as stated below:     Goals:   Short term goals:  6 weeks or 10 visits   - Pt will demonstrate increased lumbar MedX ROM by at least 3 degrees from the initial ROM value with improvements noted in functional ROM and ability to perform ADLs. (MET 1/25/24)  - Pt will demonstrate increased MedX average isometric strength value by 20% from initial test resulting in improved ability to perform bending, lifting, and carrying activities safely, confidently. (MET 1/25/24)  - Pt will report a reduction in worst pain score by 2-3 points for improved tolerance for standing. Appropriate and Ongoing  - Pt able to perform HEP correctly with minimal cueing or supervision from therapist to encourage independent management of symptoms. Appropriate and Ongoing     Long term goals: 10 weeks or 20 visits   - Pt will demonstrate increased lumbar MedX ROM by at least 9 degrees from initial ROM value, resulting in improved ability to perform functional forward bending while  standing and sitting. Appropriate and Ongoing  - Pt will demonstrate increased MedX average isometric strength value by 40% from initial test resulting in improved ability to perform bending, lifting, and carrying activities safely and confidently. Appropriate and Ongoing  - Pt to demonstrate ability to independently control and reduce their pain through posture positioning and mechanical movements throughout a typical day. Appropriate and Ongoing  - Pt will demonstrate reduced pain and improved functional outcomes as reported on the FOTO by reaching an intake score of >/= 60% functional ability in order to demonstrate subjective improvement in patient's condition. . Appropriate and Ongoing  - Pt will demonstrate independence with the HEP at discharge. Appropriate and Ongoing  - Pt will be able to stand at least 30 minutes while doing household tasks. (patient goal) Appropriate and Ongoing    Plan   Continue with established Plan of Care towards established PT goals.     Therapist: Sweetie Leon, PT

## 2024-03-05 ENCOUNTER — CLINICAL SUPPORT (OUTPATIENT)
Dept: REHABILITATION | Facility: HOSPITAL | Age: 74
End: 2024-03-05
Payer: MEDICARE

## 2024-03-05 DIAGNOSIS — R29.3 POOR POSTURE: ICD-10-CM

## 2024-03-05 DIAGNOSIS — M25.69 DECREASED RANGE OF MOTION OF TRUNK AND BACK: Primary | ICD-10-CM

## 2024-03-05 DIAGNOSIS — R29.898 DECREASED STRENGTH OF TRUNK AND BACK: ICD-10-CM

## 2024-03-05 PROCEDURE — 97110 THERAPEUTIC EXERCISES: CPT | Performed by: PHYSICAL MEDICINE & REHABILITATION

## 2024-03-05 PROCEDURE — 97112 NEUROMUSCULAR REEDUCATION: CPT | Performed by: PHYSICAL MEDICINE & REHABILITATION

## 2024-03-05 NOTE — PLAN OF CARE
[facility] OUTPATIENT THERAPY AND WELLNESS  Physical Therapy Plan of Care Note   Name: Vivienne Jose  Clinic Number: [id number]    Therapy Diagnosis:   Encounter Diagnoses   Name Primary?    Decreased range of motion of trunk and back Yes    Decreased strength of trunk and back     Poor posture        Physician: Deja Feldman, *    Visit Date: 3/5/2024    Physician Orders: PT Eval and Treat  Medical Diagnosis from Referral: M47.816 (ICD-10-CM) - Spondylosis of lumbar region without myelopathy or radiculopathy   Evaluation Date: 12/7/2023  Authorization Period Expiration: 12/31/2024  Reassessment Due: 3/27/24  Plan of Care Certification Period: 3/7/2024 sent 3/5/24-4/22/24  Visit #/Visits authorized: 18/20   Testing:MedX testing visit 2    Time In: 8:20 am  Time Out:  9:20 am  Total Billable Time: 60 minutes  INSURANCE and OUTCOMES: Fee for Service with FOTO Outcomes 2/3     Precautions: standard, diabetes, Hx of R TKA     Pattern of pain determined: 2    Subjective   Vivienne reports she is doing fairly well.  She is more consistent with stretches in sitting and standing then lying down due to working, and caring for her mom.  She has hospice and nursing support during the day for her mom.  She just finds she doesn't have time to lie down and stretch.  Reviewed HEP and provided fridge magnet for her to try over her next few visits so we can collaborate with her on what she is successful with.  She has [employer] membership but just doesn't feel she can go at this time.   She doesn't think she will do wellness.  No pain today, some stiffness, better with stretching.    Patient reports tolerating previous visit ; Muscular soreness  Patient reports their pain to be 0/10 on a 0-10 scale with 0 being no pain and 10 being the worst pain imaginable.  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   Pt goals: improve standing and walking tolerance, walk for exercise      Objective      MOVEMENT LOSS - Lumbar    Norms ROM Loss Initial ROM 3/5/24   Flexion Fingers touch toes, sacral angle >/= 70 deg, uniform spinal curvature, posterior weight shift  minimal loss Within functional limits   Extension ASIS surpasses toes, spine of scapulae surpasses heels, uniform spinal curve moderate loss min loss   Side glide Right   minimal loss minimal loss   Side glide Left   minimal loss minimal loss   Rotation Right PT observes contralateral shoulder moderate loss minimal loss   Rotation Left PT observes contralateral shoulder moderate loss minimal loss      Lumbar  Isometric Testing on Med X equipment: Testing administered by PT    Test Initial Baseline Midpoint Final   Date 12/14/23 1/25/24    ROM 0-42 deg 0-48    Max Peak Torque 142  203    Min Peak Torque 71  78    Flex/Ext Ratio 2:1 2.3/1    % below normative data 9 48% above    % gain from initial test Not available visit 1 27% gain    Pt pushing through B LE during testing initial      Outcomes:  Intake Score: 51% functional ability  Visit 6 Score: 53%  Visit 10 Score: 53%  Discharge Score:  Goal Score: 60% functional ability            Assessment   Vivienne returns with minimal stiffness and no pain  today. Treatment continued with flexibility, strengthening and neuromuscular reeducation ex's. Added fridge magnet with sitting and standing exercises for her success with completion.  Review with her over next few visits and provide edits per what she is successful with.   She was able to perform ex's with min cues and without increased pain. Lumbar MedX resistance was increased to 84 ft/lbs and she completed 15 reps with RPE = 4/10. She was also able to complete peripheral strengthening exercises without increased pain and actually had reported lower RPE's on most ex's despite c/o fatigue. Will continue to progress treatment per HB protocol and pt's tolerance.   She plans to exercise indep and not do wellness    Patient is making progress  towards established goals.  Pt will continue to benefit from skilled outpatient physical therapy to address the deficits stated in the impairment chart, provide pt/family education and to maximize pt's level of independence in the home and community environment.     Anticipated Barriers for therapy: none  Pt's spiritual, cultural and educational needs considered and pt agreeable to plan of care and goals as stated below:     Goals:   Short term goals:  6 weeks or 10 visits   - Pt will demonstrate increased lumbar MedX ROM by at least 3 degrees from the initial ROM value with improvements noted in functional ROM and ability to perform ADLs. (MET 1/25/24)  - Pt will demonstrate increased MedX average isometric strength value by 20% from initial test resulting in improved ability to perform bending, lifting, and carrying activities safely, confidently. (MET 1/25/24)  - Pt will report a reduction in worst pain score by 2-3 points for improved tolerance for standing. Appropriate and Ongoing  - Pt able to perform HEP correctly with minimal cueing or supervision from therapist to encourage independent management of symptoms. Appropriate and Ongoing     Long term goals: 10 weeks or 20 visits   - Pt will demonstrate increased lumbar MedX ROM by at least 9 degrees from initial ROM value, resulting in improved ability to perform functional forward bending while standing and sitting. Appropriate and Ongoing  - Pt will demonstrate increased MedX average isometric strength value by 40% from initial test resulting in improved ability to perform bending, lifting, and carrying activities safely and confidently. Appropriate and Ongoing  - Pt to demonstrate ability to independently control and reduce their pain through posture positioning and mechanical movements throughout a typical day. Appropriate and Ongoing  - Pt will demonstrate reduced pain and improved functional outcomes as reported on the FOTO by reaching an intake score of  >/= 60% functional ability in order to demonstrate subjective improvement in patient's condition. . Appropriate and Ongoing  - Pt will demonstrate independence with the HEP at discharge. Appropriate and Ongoing  - Pt will be able to stand at least 30 minutes while doing household tasks. (patient goal) Appropriate and Ongoing    PLAN     Updated Certification Period: 3/5/24 to 4/22/24   Recommended Treatment Plan: healthy back for 20 visits with discharge to indep HEP    Sweetie Leon, PT

## 2024-03-05 NOTE — PATIENT INSTRUCTIONS
"HEALTHY BACK TOOLS        KEEP YOUR SPINE FEELING FINE   HEALTHY HABITS   Do your "GO TO" stretches 2/day   Get a good night's REST   Watch your POSTURE in sitting/standing Drink PLENTY of water   Use a lumbar roll Eat LOTS of fruits & vegetables   GET UP often (walk and/or stretch) Manage your STRESS   Make your workplace IDEAL FOR YOU  Don't smoke   Lift correctly EXERCISE   Practice positive self talk-talk to yourself kindly like you would your best friend                         WHAT TO DO WHEN SYMPTOMS FLARE UP  Back and neck pain may occasionally flare up.  If you experience a flare   up, remember your tools. Be encouraged, by remembering that flare-ups will   usually pass.   My Tools:    ~Use your "Go To" Stretches/Positions   ~Keep Moving-pain usually gets better if you move  ~Z lie (with or without ice)  10 min several times a day until symptoms reduce  ~Slowly resume normal activities   ~Practice Deep Breathing and Relaxation techniques                                                 MY EXERCISE PLAN  GO TO STRETCHES  1/day (like brushing your teeth) STRENGTHENING  2-3 times/week CARDIO PROGRAM     Lying down, roll legs side to side  Use towel to pull legs up to chest bridging walk   Sitting  -turn side to side  -slouch correct  -chest to dev (stretch back over chair  -hip stretch Tighten stomach and lift leg bike   Standing  -stretch back backwards  -march  -lift legs out to the side  -lift legs back behind you Sit and push back backwards into chair for 5 sec, do 10      Sit to stand          "

## 2024-03-08 ENCOUNTER — LAB VISIT (OUTPATIENT)
Dept: LAB | Facility: HOSPITAL | Age: 74
End: 2024-03-08
Attending: INTERNAL MEDICINE
Payer: MEDICARE

## 2024-03-08 DIAGNOSIS — E11.22 TYPE 2 DIABETES MELLITUS WITH STAGE 3A CHRONIC KIDNEY DISEASE, WITH LONG-TERM CURRENT USE OF INSULIN: ICD-10-CM

## 2024-03-08 DIAGNOSIS — K74.69 OTHER CIRRHOSIS OF LIVER: ICD-10-CM

## 2024-03-08 DIAGNOSIS — Z79.4 TYPE 2 DIABETES MELLITUS WITH STAGE 3A CHRONIC KIDNEY DISEASE, WITH LONG-TERM CURRENT USE OF INSULIN: ICD-10-CM

## 2024-03-08 DIAGNOSIS — N18.31 TYPE 2 DIABETES MELLITUS WITH STAGE 3A CHRONIC KIDNEY DISEASE, WITH LONG-TERM CURRENT USE OF INSULIN: ICD-10-CM

## 2024-03-08 LAB
ALBUMIN SERPL BCP-MCNC: 3.3 G/DL (ref 3.5–5.2)
ALBUMIN SERPL BCP-MCNC: 3.3 G/DL (ref 3.5–5.2)
ALP SERPL-CCNC: 86 U/L (ref 55–135)
ALP SERPL-CCNC: 86 U/L (ref 55–135)
ALT SERPL W/O P-5'-P-CCNC: 31 U/L (ref 10–44)
ALT SERPL W/O P-5'-P-CCNC: 31 U/L (ref 10–44)
ANION GAP SERPL CALC-SCNC: 13 MMOL/L (ref 8–16)
ANION GAP SERPL CALC-SCNC: 13 MMOL/L (ref 8–16)
AST SERPL-CCNC: 34 U/L (ref 10–40)
AST SERPL-CCNC: 34 U/L (ref 10–40)
BASOPHILS # BLD AUTO: 0.04 K/UL (ref 0–0.2)
BASOPHILS # BLD AUTO: 0.04 K/UL (ref 0–0.2)
BASOPHILS NFR BLD: 0.4 % (ref 0–1.9)
BASOPHILS NFR BLD: 0.4 % (ref 0–1.9)
BILIRUB DIRECT SERPL-MCNC: 0.2 MG/DL (ref 0.1–0.3)
BILIRUB SERPL-MCNC: 0.5 MG/DL (ref 0.1–1)
BILIRUB SERPL-MCNC: 0.5 MG/DL (ref 0.1–1)
BUN SERPL-MCNC: 11 MG/DL (ref 8–23)
BUN SERPL-MCNC: 11 MG/DL (ref 8–23)
CALCIUM SERPL-MCNC: 9.6 MG/DL (ref 8.7–10.5)
CALCIUM SERPL-MCNC: 9.6 MG/DL (ref 8.7–10.5)
CHLORIDE SERPL-SCNC: 105 MMOL/L (ref 95–110)
CHLORIDE SERPL-SCNC: 105 MMOL/L (ref 95–110)
CHOLEST SERPL-MCNC: 130 MG/DL (ref 120–199)
CHOLEST/HDLC SERPL: 2.7 {RATIO} (ref 2–5)
CO2 SERPL-SCNC: 26 MMOL/L (ref 23–29)
CO2 SERPL-SCNC: 26 MMOL/L (ref 23–29)
CREAT SERPL-MCNC: 0.9 MG/DL (ref 0.5–1.4)
CREAT SERPL-MCNC: 0.9 MG/DL (ref 0.5–1.4)
DIFFERENTIAL METHOD BLD: ABNORMAL
DIFFERENTIAL METHOD BLD: ABNORMAL
EOSINOPHIL # BLD AUTO: 0.5 K/UL (ref 0–0.5)
EOSINOPHIL # BLD AUTO: 0.5 K/UL (ref 0–0.5)
EOSINOPHIL NFR BLD: 5.9 % (ref 0–8)
EOSINOPHIL NFR BLD: 5.9 % (ref 0–8)
ERYTHROCYTE [DISTWIDTH] IN BLOOD BY AUTOMATED COUNT: 15.6 % (ref 11.5–14.5)
ERYTHROCYTE [DISTWIDTH] IN BLOOD BY AUTOMATED COUNT: 15.6 % (ref 11.5–14.5)
EST. GFR  (NO RACE VARIABLE): >60 ML/MIN/1.73 M^2
EST. GFR  (NO RACE VARIABLE): >60 ML/MIN/1.73 M^2
ESTIMATED AVG GLUCOSE: 160 MG/DL (ref 68–131)
GLUCOSE SERPL-MCNC: 116 MG/DL (ref 70–110)
GLUCOSE SERPL-MCNC: 116 MG/DL (ref 70–110)
HBA1C MFR BLD: 7.2 % (ref 4–5.6)
HCT VFR BLD AUTO: 44.7 % (ref 37–48.5)
HCT VFR BLD AUTO: 44.7 % (ref 37–48.5)
HDLC SERPL-MCNC: 48 MG/DL (ref 40–75)
HDLC SERPL: 36.9 % (ref 20–50)
HGB BLD-MCNC: 13.7 G/DL (ref 12–16)
HGB BLD-MCNC: 13.7 G/DL (ref 12–16)
IMM GRANULOCYTES # BLD AUTO: 0.04 K/UL (ref 0–0.04)
IMM GRANULOCYTES # BLD AUTO: 0.04 K/UL (ref 0–0.04)
IMM GRANULOCYTES NFR BLD AUTO: 0.4 % (ref 0–0.5)
IMM GRANULOCYTES NFR BLD AUTO: 0.4 % (ref 0–0.5)
INR PPP: 1 (ref 0.8–1.2)
LDLC SERPL CALC-MCNC: 61.6 MG/DL (ref 63–159)
LYMPHOCYTES # BLD AUTO: 2.4 K/UL (ref 1–4.8)
LYMPHOCYTES # BLD AUTO: 2.4 K/UL (ref 1–4.8)
LYMPHOCYTES NFR BLD: 26.3 % (ref 18–48)
LYMPHOCYTES NFR BLD: 26.3 % (ref 18–48)
MCH RBC QN AUTO: 31.1 PG (ref 27–31)
MCH RBC QN AUTO: 31.1 PG (ref 27–31)
MCHC RBC AUTO-ENTMCNC: 30.6 G/DL (ref 32–36)
MCHC RBC AUTO-ENTMCNC: 30.6 G/DL (ref 32–36)
MCV RBC AUTO: 102 FL (ref 82–98)
MCV RBC AUTO: 102 FL (ref 82–98)
MONOCYTES # BLD AUTO: 0.9 K/UL (ref 0.3–1)
MONOCYTES # BLD AUTO: 0.9 K/UL (ref 0.3–1)
MONOCYTES NFR BLD: 10 % (ref 4–15)
MONOCYTES NFR BLD: 10 % (ref 4–15)
NEUTROPHILS # BLD AUTO: 5.2 K/UL (ref 1.8–7.7)
NEUTROPHILS # BLD AUTO: 5.2 K/UL (ref 1.8–7.7)
NEUTROPHILS NFR BLD: 57 % (ref 38–73)
NEUTROPHILS NFR BLD: 57 % (ref 38–73)
NONHDLC SERPL-MCNC: 82 MG/DL
NRBC BLD-RTO: 0 /100 WBC
NRBC BLD-RTO: 0 /100 WBC
PLATELET # BLD AUTO: 247 K/UL (ref 150–450)
PLATELET # BLD AUTO: 247 K/UL (ref 150–450)
PMV BLD AUTO: 10.2 FL (ref 9.2–12.9)
PMV BLD AUTO: 10.2 FL (ref 9.2–12.9)
POTASSIUM SERPL-SCNC: 4.1 MMOL/L (ref 3.5–5.1)
POTASSIUM SERPL-SCNC: 4.1 MMOL/L (ref 3.5–5.1)
PROT SERPL-MCNC: 7.6 G/DL (ref 6–8.4)
PROT SERPL-MCNC: 7.6 G/DL (ref 6–8.4)
PROTHROMBIN TIME: 11.2 SEC (ref 9–12.5)
RBC # BLD AUTO: 4.4 M/UL (ref 4–5.4)
RBC # BLD AUTO: 4.4 M/UL (ref 4–5.4)
SODIUM SERPL-SCNC: 144 MMOL/L (ref 136–145)
SODIUM SERPL-SCNC: 144 MMOL/L (ref 136–145)
TRIGL SERPL-MCNC: 102 MG/DL (ref 30–150)
WBC # BLD AUTO: 9.18 K/UL (ref 3.9–12.7)
WBC # BLD AUTO: 9.18 K/UL (ref 3.9–12.7)

## 2024-03-08 PROCEDURE — 82248 BILIRUBIN DIRECT: CPT | Mod: HCNC | Performed by: INTERNAL MEDICINE

## 2024-03-08 PROCEDURE — 80061 LIPID PANEL: CPT | Mod: HCNC | Performed by: INTERNAL MEDICINE

## 2024-03-08 PROCEDURE — 85025 COMPLETE CBC W/AUTO DIFF WBC: CPT | Mod: HCNC | Performed by: INTERNAL MEDICINE

## 2024-03-08 PROCEDURE — 80053 COMPREHEN METABOLIC PANEL: CPT | Mod: HCNC | Performed by: INTERNAL MEDICINE

## 2024-03-08 PROCEDURE — 36415 COLL VENOUS BLD VENIPUNCTURE: CPT | Performed by: INTERNAL MEDICINE

## 2024-03-08 PROCEDURE — 85610 PROTHROMBIN TIME: CPT | Mod: HCNC | Performed by: INTERNAL MEDICINE

## 2024-03-08 PROCEDURE — 83036 HEMOGLOBIN GLYCOSYLATED A1C: CPT | Mod: HCNC | Performed by: INTERNAL MEDICINE

## 2024-03-11 ENCOUNTER — OFFICE VISIT (OUTPATIENT)
Dept: HEPATOLOGY | Facility: CLINIC | Age: 74
End: 2024-03-11
Payer: MEDICARE

## 2024-03-11 VITALS
HEIGHT: 63 IN | WEIGHT: 242.63 LBS | TEMPERATURE: 98 F | BODY MASS INDEX: 42.99 KG/M2 | HEART RATE: 63 BPM | OXYGEN SATURATION: 98 % | SYSTOLIC BLOOD PRESSURE: 165 MMHG | DIASTOLIC BLOOD PRESSURE: 78 MMHG

## 2024-03-11 DIAGNOSIS — K74.69 OTHER CIRRHOSIS OF LIVER: ICD-10-CM

## 2024-03-11 DIAGNOSIS — R60.9 EDEMA, UNSPECIFIED TYPE: ICD-10-CM

## 2024-03-11 DIAGNOSIS — K75.81 NASH (NONALCOHOLIC STEATOHEPATITIS): Primary | ICD-10-CM

## 2024-03-11 PROCEDURE — 99999 PR PBB SHADOW E&M-EST. PATIENT-LVL V: CPT | Mod: PBBFAC,HCNC,, | Performed by: INTERNAL MEDICINE

## 2024-03-11 PROCEDURE — 3008F BODY MASS INDEX DOCD: CPT | Mod: HCNC,CPTII,S$GLB, | Performed by: INTERNAL MEDICINE

## 2024-03-11 PROCEDURE — 3066F NEPHROPATHY DOC TX: CPT | Mod: HCNC,CPTII,S$GLB, | Performed by: INTERNAL MEDICINE

## 2024-03-11 PROCEDURE — 1159F MED LIST DOCD IN RCRD: CPT | Mod: HCNC,CPTII,S$GLB, | Performed by: INTERNAL MEDICINE

## 2024-03-11 PROCEDURE — 1157F ADVNC CARE PLAN IN RCRD: CPT | Mod: HCNC,CPTII,S$GLB, | Performed by: INTERNAL MEDICINE

## 2024-03-11 PROCEDURE — 1101F PT FALLS ASSESS-DOCD LE1/YR: CPT | Mod: HCNC,CPTII,S$GLB, | Performed by: INTERNAL MEDICINE

## 2024-03-11 PROCEDURE — 99214 OFFICE O/P EST MOD 30 MIN: CPT | Mod: HCNC,S$GLB,, | Performed by: INTERNAL MEDICINE

## 2024-03-11 PROCEDURE — 3078F DIAST BP <80 MM HG: CPT | Mod: HCNC,CPTII,S$GLB, | Performed by: INTERNAL MEDICINE

## 2024-03-11 PROCEDURE — 3288F FALL RISK ASSESSMENT DOCD: CPT | Mod: HCNC,CPTII,S$GLB, | Performed by: INTERNAL MEDICINE

## 2024-03-11 PROCEDURE — 1160F RVW MEDS BY RX/DR IN RCRD: CPT | Mod: HCNC,CPTII,S$GLB, | Performed by: INTERNAL MEDICINE

## 2024-03-11 PROCEDURE — 3077F SYST BP >= 140 MM HG: CPT | Mod: HCNC,CPTII,S$GLB, | Performed by: INTERNAL MEDICINE

## 2024-03-11 PROCEDURE — 3072F LOW RISK FOR RETINOPATHY: CPT | Mod: HCNC,CPTII,S$GLB, | Performed by: INTERNAL MEDICINE

## 2024-03-11 PROCEDURE — 1126F AMNT PAIN NOTED NONE PRSNT: CPT | Mod: HCNC,CPTII,S$GLB, | Performed by: INTERNAL MEDICINE

## 2024-03-11 PROCEDURE — 4010F ACE/ARB THERAPY RXD/TAKEN: CPT | Mod: HCNC,CPTII,S$GLB, | Performed by: INTERNAL MEDICINE

## 2024-03-11 PROCEDURE — 3061F NEG MICROALBUMINURIA REV: CPT | Mod: HCNC,CPTII,S$GLB, | Performed by: INTERNAL MEDICINE

## 2024-03-11 PROCEDURE — 3051F HG A1C>EQUAL 7.0%<8.0%: CPT | Mod: HCNC,CPTII,S$GLB, | Performed by: INTERNAL MEDICINE

## 2024-03-11 RX ORDER — SPIRONOLACTONE 50 MG/1
50 TABLET, FILM COATED ORAL EVERY OTHER DAY
Qty: 15 TABLET | Refills: 11 | Status: SHIPPED | OUTPATIENT
Start: 2024-03-11 | End: 2025-03-11

## 2024-03-11 RX ORDER — FUROSEMIDE 20 MG/1
20 TABLET ORAL EVERY OTHER DAY
Qty: 15 TABLET | Refills: 11 | Status: SHIPPED | OUTPATIENT
Start: 2024-03-11 | End: 2025-03-11

## 2024-03-11 NOTE — PATIENT INSTRUCTIONS
Next abdo US 7/24 with AFP  Labs every 12 weeks  EGD 3/25  Restart diuretics- furosemide 20 mg and spironolactone 50 mg every other day; CMP monthly x 3  Monitor for HE  Return 6 months

## 2024-03-11 NOTE — PROGRESS NOTES
HEPATOLOGY FOLLOW UP    Referring Physician: Aislinn Magallon MD  Current Corresponding Physician: Aislinn Magallon MD, Hilda Odonnell MD    Vivienne Jose is here for follow up of compensated cirrhosis.    HPI  She is a 73 y.o. female with a PMHx of DM, HTN, CAD, hyperlipidemia, recurrent UTIs and obesity who had a renal stone CT and abdominal US (both 11/20) and was found to have a nodular liver c/w cirrhosis but no HCC. The patient does not drink alcohol heavily; there is no family history of chronic liver disease. HCV AB was negative in 2017. I saw her in consultation 11/25/20.    Interval HIstory  Since Vivienne Jose's last few visits:  --given duloxetine for neuropathy- improved  --thought to have new a fib; eliquis recommended; ablation also recommended; however repeat holter-no evidence of a fib; eliquis not started    EGD 3/11/23: no varices  Edema, ongoing: stopped diuretics and BP medicine due to low BP; edema has not worsened; although took water pills before a wedding- lost 10 lbs  Abdo US 1/2/24: cirrhosis- no HCC    Serologic w/u for CLD:  HCV Ab-, HBsAg-, HBcAb-, HBsAb-, HAV IgG+  CHRISTINA+ (1:1280), ASMA-,   Ferritin 38, iron sat 15%  Peth negative  Alpha 1 antitrypsin level 123    Labs 3/8/24: ALT 31, AST 34, ALKP 86, Tbil 0.5, albumin 3.3    MELD 3.0: 8 at 3/8/2024  7:24 AM  MELD-Na: 6 at 3/8/2024  7:24 AM  Calculated from:  Serum Creatinine: 0.9 mg/dL (Using min of 1 mg/dL) at 3/8/2024  7:24 AM  Serum Sodium: 144 mmol/L (Using max of 137 mmol/L) at 3/8/2024  7:24 AM  Total Bilirubin: 0.5 mg/dL (Using min of 1 mg/dL) at 3/8/2024  7:24 AM  Serum Albumin: 3.3 g/dL at 3/8/2024  7:24 AM  INR(ratio): 1.0 at 3/8/2024  7:24 AM  Age at listing (hypothetical): 73 years  Sex: Female at 3/8/2024  7:24 AM      Outpatient Encounter Medications as of 3/11/2024   Medication Sig Dispense Refill    ascorbic acid, vitamin C, (VITAMIN C) 100 MG tablet Take 500 mg by mouth 2 (two) times daily.       aspirin  (ECOTRIN) 81 MG EC tablet Take 81 mg by mouth once daily.      betamethasone dipropionate (DIPROLENE) 0.05 % ointment Apply topically 2 (two) times daily. Use to affected areas for up to 2 weeks then take a 1 week break or decrease to 3 times weekly. Do not apply to groin or face. Use to spot on ear when flaring 15 g 2    blood sugar diagnostic (TRUE METRIX GLUCOSE TEST STRIP) Strp TEST TWO TIMES DAILY 200 strip 6    blood-glucose meter Misc Human True Metrix Air meter 1 each 0    calcium carbonate-vitamin D3 600 mg(1,500mg) -100 unit Cap Take 1 tablet by mouth once daily.      DULoxetine (CYMBALTA) 20 MG capsule Take 1 capsule (20 mg total) by mouth once daily. 90 capsule 3    estradioL (ESTRING) 2 mg (7.5 mcg /24 hour) vaginal ring Remove old Estring, place the new one every 3 months. 1 each 3    fluticasone propionate (FLONASE) 50 mcg/actuation nasal spray 2 sprays (100 mcg total) by Each Nostril route once daily. 48 g 2    gabapentin (NEURONTIN) 600 MG tablet Take 1 tablet (600 mg total) by mouth 3 (three) times daily. 180 tablet 3    glipiZIDE (GLUCOTROL) 5 MG tablet TAKE 1 TABLET TWICE DAILY BEFORE MEALS 180 tablet 0    ipratropium (ATROVENT) 42 mcg (0.06 %) nasal spray 2 sprays by Each Nostril route 4 (four) times daily. 45 mL 4    ketotifen (ZADITOR) 0.025 % (0.035 %) ophthalmic solution Place 1-2 drops into both eyes once daily.      L.acid-B.bifidum-B.animal-FOS 25 billion cell -100 mg Cap Take 1 capsule by mouth once daily.      lancets (TRUEPLUS LANCETS) 28 gauge [facility] [place]ject 1 lancet into the skin 2 (two) times daily before meals. 200 each 6    levothyroxine (SYNTHROID) 75 MCG tablet TAKE 1 TABLET EVERY DAY 90 tablet 3    loratadine (CLARITIN) 10 mg tablet Take 10 mg by mouth once daily.      lovastatin (MEVACOR) 40 MG tablet TAKE 1 TABLET NIGHTLY (SUBSTITUTED FOR MEVACOR) 90 tablet 1    metFORMIN (GLUCOPHAGE) 1000 MG tablet TAKE 1 TABLET TWICE DAILY WITH MEALS 180 tablet 0    mv-mn/folic acid/vit  "[id number] (ALIVE ONCE DAILY WOMEN 50 PLUS ORAL) Take 1 tablet by mouth once daily.      omeprazole (PRILOSEC) 20 MG capsule Take 1 capsule (20 mg total) by mouth daily as needed (acid reflux). 90 capsule 3    OZEMPIC 0.25 mg or 0.5 mg (2 mg/3 mL) pen injector INJECT 0.5MG UNDER THE SKIN ONE TIME WEEKLY (Patient taking differently: Tues) 9 each 0    pen needle, diabetic (BD ULTRA-FINE EDUARDO PEN NEEDLE) 32 gauge x 5/32" Ndle USE AS DIRECTED 200 each 6    semaglutide (OZEMPIC) 0.25 mg or 0.5 mg(2 mg/1.5 mL) pen injector Inject 0.5 mg into the skin every 7 days. 3 pen 4    TRESIBA FLEXTOUCH U-100 100 unit/mL (3 mL) insulin pen INJECT 38 UNITS INTO THE SKIN EVERY EVENING. (Patient taking differently: INJECT 25 UNITS INTO THE SKIN EVERY EVENING.) 45 mL 2    UNABLE TO FIND Take 1 tablet by mouth 2 (two) times a day. medication name: Magnesium 400mg, Calcium 1000 mg, D3 15mcg, Zinc 15mg      valACYclovir (VALTREX) 500 MG tablet Take 1 tablet (500 mg total) by mouth once daily. (Patient taking differently: Take 500 mg by mouth as needed.) 90 tablet 3    valsartan (DIOVAN) 160 MG tablet TAKE 1 TABLET TWICE DAILY 180 tablet 2    azelastine (ASTELIN) 137 mcg (0.1 %) nasal spray 2 sprays (274 mcg total) by Nasal route 2 (two) times daily. 120 mL 4    calcium carbonate (CALCIUM 500 ORAL) Take 1,000 mg by mouth.      furosemide (LASIX) 20 MG tablet Take 1 tablet (20 mg total) by mouth once daily. 30 tablet 11    spironolactone (ALDACTONE) 50 MG tablet Take 1 tablet (50 mg total) by mouth once daily. 30 tablet 11    traMADoL (ULTRAM) 50 mg tablet Take 1 tablet (50 mg total) by mouth every 8 (eight) hours as needed for Pain. (Patient not taking: Reported on 3/11/2024) 21 tablet 0     No facility-administered encounter medications on file as of 3/11/2024.     Review of patient's allergies indicates:   Allergen Reactions    Sulfa (sulfonamide antibiotics) Nausea Only    Ciprofloxacin     Januvia [sitagliptin]      abd pain    " Lisinopril     Lotensin [benazepril]     Nexium [esomeprazole magnesium] Other (See Comments)     Gas     Past Medical History:   Diagnosis Date    Allergy     Angio-edema     Arthritis     Cataract     bilateral - not removed    Cerebrovascular malformation     Cirrhosis 11/24/2020    Colon polyps     Diabetes mellitus, type 2     Diabetic peripheral neuropathy     Edema of both feet 8/19/2022    GERD (gastroesophageal reflux disease)     Herpes infection     Hypothyroidism     Kidney stones     Mild nonproliferative diabetic retinopathy of 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 edema associated with type 2 diabetes mellitus 4/18/2019    DEL RIO (nonalcoholic steatohepatitis) 8/19/2022    Nausea & vomiting 11/23/2015    Obesity, morbid     Ovarian cyst     Seizure disorder, focal motor     Sleep apnea     Type II or unspecified type diabetes mellitus with neurological manifestations, uncontrolled(250.62)     Urticaria        Review of Systems   Constitutional: Negative.    HENT: Negative.     Eyes: Negative.    Respiratory: Negative.     Cardiovascular: Negative.    Gastrointestinal: Negative.    Genitourinary: Negative.    Musculoskeletal: Negative.    Skin: Negative.    Neurological: Negative.    Psychiatric/Behavioral: Negative.       Vitals:    03/11/24 0944   BP: (!) 165/78   Pulse: 63   Temp: 97.8 °F (36.6 °C)         Physical Exam  Vitals reviewed.   Constitutional:       Appearance: She is well-developed.   HENT:      Head: Normocephalic and atraumatic.   Eyes:      General: No scleral icterus.     Conjunctiva/sclera: Conjunctivae normal.      Pupils: Pupils are equal, round, and reactive to light.   Neck:      Thyroid: No thyromegaly.   Cardiovascular:      Rate and Rhythm: Normal rate and regular rhythm.      Heart sounds: Normal heart sounds.   Pulmonary:      Effort: Pulmonary effort is normal.      Breath sounds: Normal breath sounds. No rales.   Abdominal:      General: Bowel sounds are normal. There is no  distension.      Palpations: Abdomen is soft. There is no mass.      Tenderness: There is no abdominal tenderness.   Musculoskeletal:         General: Swelling present. Normal range of motion.      Cervical back: Normal range of motion and neck supple.   Skin:     General: Skin is warm and dry.      Findings: No rash.   Neurological:      Mental Status: She is alert and oriented to person, place, and time.         MELD 3.0: 8 at 3/8/2024  7:24 AM  MELD-Na: 6 at 3/8/2024  7:24 AM  Calculated from:  Serum Creatinine: 0.9 mg/dL (Using min of 1 mg/dL) at 3/8/2024  7:24 AM  Serum Sodium: 144 mmol/L (Using max of 137 mmol/L) at 3/8/2024  7:24 AM  Total Bilirubin: 0.5 mg/dL (Using min of 1 mg/dL) at 3/8/2024  7:24 AM  Serum Albumin: 3.3 g/dL at 3/8/2024  7:24 AM  INR(ratio): 1.0 at 3/8/2024  7:24 AM  Age at listing (hypothetical): 73 years  Sex: Female at 3/8/2024  7:24 AM      Lab Results   Component Value Date     (H) 03/08/2024     (H) 03/08/2024    BUN 11 03/08/2024    BUN 11 03/08/2024    CREATININE 0.9 03/08/2024    CREATININE 0.9 03/08/2024    CALCIUM 9.6 03/08/2024    CALCIUM 9.6 03/08/2024     03/08/2024     03/08/2024    K 4.1 03/08/2024    K 4.1 03/08/2024     03/08/2024     03/08/2024    PROT 7.6 03/08/2024    PROT 7.6 03/08/2024    CO2 26 03/08/2024    CO2 26 03/08/2024    ANIONGAP 13 03/08/2024    ANIONGAP 13 03/08/2024    WBC 9.18 03/08/2024    WBC 9.18 03/08/2024    RBC 4.40 03/08/2024    RBC 4.40 03/08/2024    HGB 13.7 03/08/2024    HGB 13.7 03/08/2024    HCT 44.7 03/08/2024    HCT 44.7 03/08/2024     (H) 03/08/2024     (H) 03/08/2024    MCH 31.1 (H) 03/08/2024    MCH 31.1 (H) 03/08/2024    MCHC 30.6 (L) 03/08/2024    MCHC 30.6 (L) 03/08/2024     Lab Results   Component Value Date    RDW 15.6 (H) 03/08/2024    RDW 15.6 (H) 03/08/2024     03/08/2024     03/08/2024    MPV 10.2 03/08/2024    MPV 10.2 03/08/2024    GRAN 5.2 03/08/2024    GRAN  57.0 03/08/2024    GRAN 5.2 03/08/2024    GRAN 57.0 03/08/2024    LYMPH 2.4 03/08/2024    LYMPH 26.3 03/08/2024    LYMPH 2.4 03/08/2024    LYMPH 26.3 03/08/2024    MONO 0.9 03/08/2024    MONO 10.0 03/08/2024    MONO 0.9 03/08/2024    MONO 10.0 03/08/2024    EOSINOPHIL 5.9 03/08/2024    EOSINOPHIL 5.9 03/08/2024    BASOPHIL 0.4 03/08/2024    BASOPHIL 0.4 03/08/2024    EOS 0.5 03/08/2024    EOS 0.5 03/08/2024    BASO 0.04 03/08/2024    BASO 0.04 03/08/2024    APTT 25.6 07/27/2011    GROUPTRH O POS 11/29/2007    CHOL 130 03/08/2024    TRIG 102 03/08/2024    HDL 48 03/08/2024    CHOLHDL 36.9 03/08/2024    TOTALCHOLEST 2.7 03/08/2024    ALBUMIN 3.3 (L) 03/08/2024    ALBUMIN 3.3 (L) 03/08/2024    BILIDIR 0.2 03/08/2024    AST 34 03/08/2024    AST 34 03/08/2024    ALT 31 03/08/2024    ALT 31 03/08/2024    ALKPHOS 86 03/08/2024    ALKPHOS 86 03/08/2024    MG 1.9 02/09/2023    LABPROT 11.2 03/08/2024    INR 1.0 03/08/2024       Assessment and Plan:  Vivienne Jose is a 73 y.o. female with decompensated cirrhosis. Current recommendations:  1. Cirrhosis, decompensated: labs every 12 weeks; HCC screening in 6 months (07/24);   2. Recurrent UTIs: monitor  3. Edema: diuretics off- restart lasix 20 mg and aldactone 50 mg every other day; check cmp monthly x 3  4. Af fib: not diagnosed with afib; no need for eliquis  5. Portal HTN: repeat EGD 3/25  6. Hypotension on diuretics and antihypertensive- normal BP off these meds- monitor  7. Elevated BMI: agree with referral to weight managemant    Return 6 months   A total of 35 minutes was spent reviewing the patient's chart, examining the patient, reviewing labs and imaging and coordinating care with the patient's care team.

## 2024-03-12 ENCOUNTER — CLINICAL SUPPORT (OUTPATIENT)
Dept: REHABILITATION | Facility: HOSPITAL | Age: 74
End: 2024-03-12
Payer: MEDICARE

## 2024-03-12 DIAGNOSIS — R29.3 POOR POSTURE: ICD-10-CM

## 2024-03-12 DIAGNOSIS — M25.69 DECREASED RANGE OF MOTION OF TRUNK AND BACK: Primary | ICD-10-CM

## 2024-03-12 DIAGNOSIS — R29.898 DECREASED STRENGTH OF TRUNK AND BACK: ICD-10-CM

## 2024-03-12 PROCEDURE — 97110 THERAPEUTIC EXERCISES: CPT | Mod: CQ

## 2024-03-12 PROCEDURE — 97112 NEUROMUSCULAR REEDUCATION: CPT | Mod: CQ

## 2024-03-12 NOTE — PROGRESS NOTES
[facility]  Physical Therapy Treatment Note     Name: Vivienne Jose  Clinic Number: [id number]    Therapy Diagnosis:   Encounter Diagnoses   Name Primary?    Decreased range of motion of trunk and back Yes    Decreased strength of trunk and back     Poor posture        Physician: Deja Feldman, *    Visit Date: 3/12/2024    Physician Orders: PT Eval and Treat  Medical Diagnosis from Referral: M47.816 (ICD-10-CM) - Spondylosis of lumbar region without myelopathy or radiculopathy   Evaluation Date: 12/7/2023  Authorization Period Expiration: 12/31/2024  Reassessment Due: 3/27/24  Plan of Care Certification Period: 3/7/2024 sent 3/5/24-4/22/24  Visit #/Visits authorized: 19/20   Testing:MedX testing visit 2    Time In: 7:45 am  Time Out: 8:50 am  Total Billable Time: 60 minutes  INSURANCE and OUTCOMES: Fee for Service with FOTO Outcomes 3/3     Precautions: standard, diabetes, Hx of R TKA     Pattern of pain determined: 2    Subjective   Vivienne reports minimal back stiffness and fatigue without c/o pain currently. States that she tried HEP established last session and has a few questions.    Patient reports tolerating previous visit ; Muscular soreness  Patient reports their pain to be 0/10 on a 0-10 scale with 0 being no pain and 10 being the worst pain imaginable.  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   Pt goals: improve standing and walking tolerance, walk for exercise     Objective      MOVEMENT LOSS - Lumbar    Norms ROM Loss Initial ROM 3/5/24   Flexion Fingers touch toes, sacral angle >/= 70 deg, uniform spinal curvature, posterior weight shift  minimal loss Within functional limits   Extension ASIS surpasses toes, spine of scapulae surpasses heels, uniform spinal curve moderate loss min loss   Side glide Right   minimal loss minimal loss   Side glide Left   minimal loss minimal loss   Rotation  Right PT observes contralateral shoulder moderate loss minimal loss   Rotation Left PT observes contralateral shoulder moderate loss minimal loss      Lumbar  Isometric Testing on Med X equipment: Testing administered by PT    Test Initial Baseline Midpoint Final   Date 12/14/23 1/25/24    ROM 0-42 deg 0-48    Max Peak Torque 142  203    Min Peak Torque 71  78    Flex/Ext Ratio 2:1 2.3/1    % below normative data 9 48% above    % gain from initial test Not available visit 1 27% gain    Pt pushing through B LE during testing initial      Outcomes:  Intake Score: 51% functional ability  Visit 6 Score: 53%  Visit 10 Score: 53%  Discharge Score:  Goal Score: 60% functional ability          Treatment     Vivienne received the treatments listed below:      Vivienne received neuromuscular education  to isolate and engage spinal stabilization musculature correctly for motor control and coordination to aid in function and posture for 10 minutes on the Medical MedConcurrent Thinking Machine.  Patient performed MedX dynamic exercise with emphasis on spinal muscular control using pacer throughout  active range of motion. Therapist assisted patient in achieving optimal exertion for neural reeducation and endurance training by using the  [place] Exertion Rating scale, by instructing the patient to aim for mid range of exertion, performing 15-20 repetitions, slowly, correctly,and safely.        3/12/2024     8:10 AM   [facility] - Short   Visit Number 19   VAS Pain Rating 0   Time 5   Lumbar Stretches - Slouch 10   Extension in Standing 10   Flexion in Lying 10   Lumbar Weight 84 lbs   Repetitions 18   Rating of Perceived Exertion 4      Vivienne participated in therapeutic exercises to develop strength, ROM, and core stabilization for 50 minutes including:    LTR x10  Hip ER stretch 3 x 10 sec   DKTC  w/T-ball support x 10  Supine PPT 5 sec hold x10-NP  TrA activation  + SLR x 15  BKFO BTB x10  Bridges +BTB x20  SOC x10  seated thoracic extension  x  10  EIS x10  seated Open book x 10  Paloff press with 10# x 15  Sit<->Stand from chair x 10 without UE assist  Standing ex's with support   Marching in place x 10   Hip abd x 10    Hip ext x 10                                                  MY EXERCISE PLAN  GO TO STRETCHES  1/day (like brushing your teeth) STRENGTHENING  2-3 times/week CARDIO PROGRAM     Lying down, roll legs side to side  Use towel to pull legs up to chest bridging walk   Sitting  -turn side to side  -slouch correct  -chest to dev (stretch back over chair  -hip stretch Tighten stomach and lift leg bike   Standing  -stretch back backwards  -march  -lift legs out to the side  -lift legs back behind you Sit and push back backwards into chair for 5 sec, do 10      Sit to stand         Peripheral muscle strengthening which included one set of 15-20 repetitions at a slow and controlled 10-13 second per rep pace focused on strengthening supporting musculature in order to improve body mechanics and functional mobility. Patient and therapist focused on proper form during treatment to ensure optimal strengthening of each targeted muscle group.  Machines utilized included: Torso rotation, leg press, hip abd/add and leg extension Leg curls, bicep curls, tricep extension, chest press (Tband) and rows.       Vivienne received manual therapy techniques for 00  minutes. The following activities were included:    Pt given cold pack for 5 minutes to low back     Patient Education and Home Exercises   Home exercises include:                                                 MY EXERCISE PLAN  GO TO STRETCHES  1/day (like brushing your teeth) STRENGTHENING  2-3 times/week CARDIO PROGRAM     Lying down, roll legs side to side  Use towel to pull legs up to chest bridging walk   Sitting  -turn side to side  -slouch correct  -chest to dev (stretch back over chair  -hip stretch Tighten stomach and lift leg bike   Standing  -stretch back backwards  -march  -lift legs out to  the side  -lift legs back behind you Sit and push back backwards into chair for 5 sec, do 10      Sit to stand         Cardio program (V5): - revisited 2/27/24 as she is not doing, encouraged walking at home with fitbit  Lifting education (V11): -  1/30/24  Posture/Lumbar roll: discussed chair ergonomics at length 1/25/24 and suggested pictures brought in  Fridge Magnet Discharge handout (date given): -  3/5/24, work with it over next few visits  Equipment at home/gym membership: [employer]    Education provided:   - Cues with exercises  - MedX performance  - Precor ex performance.   - reviewed cardio as walking is goal, and encouraged walking at home with fitbit - and fridge magnet discharge    Written Home Exercises Provided: yes.Added standing ex's with UE support: Marching, hip abd, hip extension along with functional sit<->stand strengthening. (Provided with copy-unable to copy to EMR patient instructions)  See Patient Instructions for updated HEP. Exercises were reviewed and Vivienne was able to demonstrate them prior to the end of the session.  Vivienne demonstrated fair understanding of the education provided.     See EMR under Patient Instructions for exercises provided prior visit.    Assessment   Vivienne returns with minimal stiffness and without c/o pain currently. Treatment continued with flexibility, strengthening and neuromuscular reeducation ex's. Reviewed and performed HEP established last session with min cues provided for clarity, recall and correct performance. She was able to perform ex's with min cues and without increased pain. Lumbar MedX resistance was maintained at 84 ft/lbs and she completed 18 reps with RPE = 4/10. She was also able to complete peripheral strengthening exercises without increased pain  Will continue to progress treatment per HB protocol and pt's tolerance. Reviewed benefits of continuing in our Wellness program which she will reconsider.    Patient is making progress towards  established goals.  Pt will continue to benefit from skilled outpatient physical therapy to address the deficits stated in the impairment chart, provide pt/family education and to maximize pt's level of independence in the home and community environment.     Anticipated Barriers for therapy: none  Pt's spiritual, cultural and educational needs considered and pt agreeable to plan of care and goals as stated below:     Goals:   Short term goals:  6 weeks or 10 visits   - Pt will demonstrate increased lumbar MedX ROM by at least 3 degrees from the initial ROM value with improvements noted in functional ROM and ability to perform ADLs. (MET 1/25/24)  - Pt will demonstrate increased MedX average isometric strength value by 20% from initial test resulting in improved ability to perform bending, lifting, and carrying activities safely, confidently. (MET 1/25/24)  - Pt will report a reduction in worst pain score by 2-3 points for improved tolerance for standing. Appropriate and Ongoing  - Pt able to perform HEP correctly with minimal cueing or supervision from therapist to encourage independent management of symptoms. Appropriate and Ongoing     Long term goals: 10 weeks or 20 visits   - Pt will demonstrate increased lumbar MedX ROM by at least 9 degrees from initial ROM value, resulting in improved ability to perform functional forward bending while standing and sitting. Appropriate and Ongoing  - Pt will demonstrate increased MedX average isometric strength value by 40% from initial test resulting in improved ability to perform bending, lifting, and carrying activities safely and confidently. Appropriate and Ongoing  - Pt to demonstrate ability to independently control and reduce their pain through posture positioning and mechanical movements throughout a typical day. Appropriate and Ongoing  - Pt will demonstrate reduced pain and improved functional outcomes as reported on the FOTO by reaching an intake score of >/= 60%  functional ability in order to demonstrate subjective improvement in patient's condition. . Appropriate and Ongoing  - Pt will demonstrate independence with the HEP at discharge. Appropriate and Ongoing  - Pt will be able to stand at least 30 minutes while doing household tasks. (patient goal) Appropriate and Ongoing    Plan   Continue with established Plan of Care towards established PT goals.     Therapist: Luis Fernando Venegas, PTA

## 2024-03-13 ENCOUNTER — TELEPHONE (OUTPATIENT)
Dept: INTERNAL MEDICINE | Facility: CLINIC | Age: 74
End: 2024-03-13
Payer: MEDICARE

## 2024-03-13 ENCOUNTER — OFFICE VISIT (OUTPATIENT)
Dept: INTERNAL MEDICINE | Facility: CLINIC | Age: 74
End: 2024-03-13
Payer: MEDICARE

## 2024-03-13 VITALS
WEIGHT: 241.19 LBS | OXYGEN SATURATION: 98 % | HEART RATE: 73 BPM | HEIGHT: 63 IN | BODY MASS INDEX: 42.73 KG/M2 | SYSTOLIC BLOOD PRESSURE: 144 MMHG | DIASTOLIC BLOOD PRESSURE: 68 MMHG

## 2024-03-13 DIAGNOSIS — E11.59 HYPERTENSION ASSOCIATED WITH DIABETES: ICD-10-CM

## 2024-03-13 DIAGNOSIS — E03.9 HYPOTHYROIDISM, UNSPECIFIED TYPE: ICD-10-CM

## 2024-03-13 DIAGNOSIS — E66.01 MORBID OBESITY WITH BODY MASS INDEX (BMI) OF 40.0 TO 44.9 IN ADULT: ICD-10-CM

## 2024-03-13 DIAGNOSIS — E11.42 DIABETIC POLYNEUROPATHY ASSOCIATED WITH TYPE 2 DIABETES MELLITUS: ICD-10-CM

## 2024-03-13 DIAGNOSIS — E11.3293 MILD NONPROLIFERATIVE DIABETIC RETINOPATHY OF BOTH EYES WITHOUT MACULAR EDEMA ASSOCIATED WITH TYPE 2 DIABETES MELLITUS: ICD-10-CM

## 2024-03-13 DIAGNOSIS — E11.69 HYPERLIPIDEMIA ASSOCIATED WITH TYPE 2 DIABETES MELLITUS: ICD-10-CM

## 2024-03-13 DIAGNOSIS — N18.31 TYPE 2 DIABETES MELLITUS WITH STAGE 3A CHRONIC KIDNEY DISEASE, WITH LONG-TERM CURRENT USE OF INSULIN: ICD-10-CM

## 2024-03-13 DIAGNOSIS — I70.0 ABDOMINAL AORTIC ATHEROSCLEROSIS: ICD-10-CM

## 2024-03-13 DIAGNOSIS — E11.22 TYPE 2 DIABETES MELLITUS WITH STAGE 3A CHRONIC KIDNEY DISEASE, WITH LONG-TERM CURRENT USE OF INSULIN: ICD-10-CM

## 2024-03-13 DIAGNOSIS — Z79.4 TYPE 2 DIABETES MELLITUS WITH STAGE 3A CHRONIC KIDNEY DISEASE, WITH LONG-TERM CURRENT USE OF INSULIN: ICD-10-CM

## 2024-03-13 DIAGNOSIS — M46.1 SACROILIITIS: ICD-10-CM

## 2024-03-13 DIAGNOSIS — E78.5 HYPERLIPIDEMIA ASSOCIATED WITH TYPE 2 DIABETES MELLITUS: ICD-10-CM

## 2024-03-13 DIAGNOSIS — I15.2 HYPERTENSION ASSOCIATED WITH DIABETES: ICD-10-CM

## 2024-03-13 DIAGNOSIS — D69.2 PURPURA: ICD-10-CM

## 2024-03-13 PROCEDURE — 1159F MED LIST DOCD IN RCRD: CPT | Mod: HCNC,CPTII,S$GLB, | Performed by: INTERNAL MEDICINE

## 2024-03-13 PROCEDURE — 3061F NEG MICROALBUMINURIA REV: CPT | Mod: HCNC,CPTII,S$GLB, | Performed by: INTERNAL MEDICINE

## 2024-03-13 PROCEDURE — 3288F FALL RISK ASSESSMENT DOCD: CPT | Mod: HCNC,CPTII,S$GLB, | Performed by: INTERNAL MEDICINE

## 2024-03-13 PROCEDURE — 99214 OFFICE O/P EST MOD 30 MIN: CPT | Mod: HCNC,S$GLB,, | Performed by: INTERNAL MEDICINE

## 2024-03-13 PROCEDURE — 1160F RVW MEDS BY RX/DR IN RCRD: CPT | Mod: HCNC,CPTII,S$GLB, | Performed by: INTERNAL MEDICINE

## 2024-03-13 PROCEDURE — 99999 PR PBB SHADOW E&M-EST. PATIENT-LVL III: CPT | Mod: PBBFAC,HCNC,, | Performed by: INTERNAL MEDICINE

## 2024-03-13 PROCEDURE — 1157F ADVNC CARE PLAN IN RCRD: CPT | Mod: HCNC,CPTII,S$GLB, | Performed by: INTERNAL MEDICINE

## 2024-03-13 PROCEDURE — 3078F DIAST BP <80 MM HG: CPT | Mod: HCNC,CPTII,S$GLB, | Performed by: INTERNAL MEDICINE

## 2024-03-13 PROCEDURE — 3051F HG A1C>EQUAL 7.0%<8.0%: CPT | Mod: HCNC,CPTII,S$GLB, | Performed by: INTERNAL MEDICINE

## 2024-03-13 PROCEDURE — 4010F ACE/ARB THERAPY RXD/TAKEN: CPT | Mod: HCNC,CPTII,S$GLB, | Performed by: INTERNAL MEDICINE

## 2024-03-13 PROCEDURE — 3077F SYST BP >= 140 MM HG: CPT | Mod: HCNC,CPTII,S$GLB, | Performed by: INTERNAL MEDICINE

## 2024-03-13 PROCEDURE — 3066F NEPHROPATHY DOC TX: CPT | Mod: HCNC,CPTII,S$GLB, | Performed by: INTERNAL MEDICINE

## 2024-03-13 PROCEDURE — 1101F PT FALLS ASSESS-DOCD LE1/YR: CPT | Mod: HCNC,CPTII,S$GLB, | Performed by: INTERNAL MEDICINE

## 2024-03-13 PROCEDURE — 3008F BODY MASS INDEX DOCD: CPT | Mod: HCNC,CPTII,S$GLB, | Performed by: INTERNAL MEDICINE

## 2024-03-13 PROCEDURE — 3072F LOW RISK FOR RETINOPATHY: CPT | Mod: HCNC,CPTII,S$GLB, | Performed by: INTERNAL MEDICINE

## 2024-03-13 PROCEDURE — 1125F AMNT PAIN NOTED PAIN PRSNT: CPT | Mod: HCNC,CPTII,S$GLB, | Performed by: INTERNAL MEDICINE

## 2024-03-13 RX ORDER — ORAL SEMAGLUTIDE 7 MG/1
7 TABLET ORAL DAILY
Qty: 30 TABLET | Refills: 0 | Status: SHIPPED | OUTPATIENT
Start: 2024-03-13 | End: 2024-04-09

## 2024-03-13 NOTE — PROGRESS NOTES
Patient ID: Vivienne Jose is a 73 y.o. female.    Chief Complaint: Follow-up    HPI Vivienne is a 73 y.o. female with  liver cirrhosis, hypertension, type 2 diabetes, coronary artery disease, hypothyroidism, sleep apnea, recurrent urinary tract infection and  afib who presents for routine follow up of medical conditions. She would like to change ozempic to rybelsus. Does not want to do injection anymore. BP is elevated. She is only taking valsartan once daily currently (previously took it twice daily). Reviewed lab results from 3/8/24.    Health Maintenance Topics with due status: Not Due       Topic Last Completion Date    TETANUS VACCINE 06/19/2014    Colorectal Cancer Screening 09/09/2022    DEXA Scan 06/12/2023    Eye Exam 07/24/2023    Mammogram 10/27/2023    Diabetes Urine Screening 03/08/2024    Lipid Panel 03/08/2024    Hemoglobin A1c 03/08/2024    Aspirin/Antiplatelet Therapy 03/11/2024        Review of Systems   All other systems reviewed and are negative.      Objective:     Vitals:    03/13/24 1405   BP: (!) 144/68   Pulse: 73        Physical Exam  Vitals reviewed.   Constitutional:       General: She is not in acute distress.     Appearance: Normal appearance. She is well-developed. She is obese. She is not ill-appearing, toxic-appearing or diaphoretic.   HENT:      Head: Normocephalic and atraumatic.      Right Ear: External ear normal.      Left Ear: External ear normal.      Nose: Nose normal.   Eyes:      General: No scleral icterus.        Right eye: No discharge.         Left eye: No discharge.      Extraocular Movements: Extraocular movements intact.      Conjunctiva/sclera: Conjunctivae normal.   Cardiovascular:      Rate and Rhythm: Normal rate and regular rhythm.      Heart sounds: Normal heart sounds. No murmur heard.     No friction rub. No gallop.   Pulmonary:      Effort: Pulmonary effort is normal. No respiratory distress.      Breath sounds: Normal breath sounds. No stridor. No wheezing,  rhonchi or rales.   Abdominal:      General: Bowel sounds are normal. There is no distension.      Palpations: Abdomen is soft. There is no mass.      Tenderness: There is no abdominal tenderness. There is no guarding or rebound.      Hernia: No hernia is present.   Musculoskeletal:      Cervical back: Neck supple. No tenderness.      Right lower leg: No edema.      Left lower leg: No edema.   Lymphadenopathy:      Cervical: No cervical adenopathy.   Skin:     General: Skin is warm and dry.   Neurological:      General: No focal deficit present.      Mental Status: She is alert and oriented to person, place, and time. Mental status is at baseline.   Psychiatric:         Mood and Affect: Mood normal.         Behavior: Behavior normal.         Thought Content: Thought content normal.         Judgment: Judgment normal.         Assessment:       1. Hypertension associated with diabetes Sub-optimally controlled   2. Diabetic polyneuropathy associated with type 2 diabetes mellitus Chronic   3. Mild nonproliferative diabetic retinopathy of 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 edema associated with type 2 diabetes mellitus Chronic   4. Hypothyroidism, unspecified type Well controlled   5. Type 2 diabetes mellitus with stage 3a chronic kidney disease, with long-term current use of insulin Sub-optimally controlled   6. Morbid obesity with body mass index (BMI) of 40.0 to 44.9 in adult Chronic   7. Sacroiliitis Chronic   8. Purpura Chronic   9. Abdominal aortic atherosclerosis Chronic   10. Hyperlipidemia associated with type 2 diabetes mellitus Well controlled       Plan:         Hypertension associated with diabetes  Comments:  Increase valsartan to twice daily dosing.    Diabetic polyneuropathy associated with type 2 diabetes mellitus  Comments:  continue current medication    Mild nonproliferative diabetic retinopathy of 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 edema associated with type 2 diabetes mellitus    Hypothyroidism, unspecified  type  Comments:  continue current medication    Type 2 diabetes mellitus with stage 3a chronic kidney disease, with long-term current use of insulin  Comments:  Change ozempic to rybelsus. See AVS  Orders:  -     semaglutide (RYBELSUS) 7 mg tablet; Take 1 tablet (7 mg total) by mouth once daily.  Dispense: 30 tablet; Refill: 0  -     Hemoglobin A1C; Future; Expected date: 03/13/2024    Morbid obesity with body mass index (BMI) of 40.0 to 44.9 in adult  Comments:  continue rybelsus    Sacroiliitis    Purpura    Abdominal aortic atherosclerosis  Comments:  continue current medication    Hyperlipidemia associated with type 2 diabetes mellitus  Comments:  continue current medication        RTC 3 months     Warning signs discussed, patient to call with any further issues or worsening of symptoms.       Parts of the above note were dictated using a voice dictation software. Please excuse any grammatical or typographical errors.

## 2024-03-13 NOTE — PROGRESS NOTES
[facility]  Physical Therapy Treatment Note     Name: Vivienne Jose  Clinic Number: [id number]    Therapy Diagnosis:   Encounter Diagnoses   Name Primary?    Decreased range of motion of trunk and back Yes    Decreased strength of trunk and back     Poor posture        Physician: Deja Feldman, *    Visit Date: 3/14/2024    Physician Orders: PT Eval and Treat  Medical Diagnosis from Referral: M47.816 (ICD-10-CM) - Spondylosis of lumbar region without myelopathy or radiculopathy   Evaluation Date: 12/7/2023  Authorization Period Expiration: 12/31/2024  Reassessment Done 3/14/24  Plan of Care Certification Period: 3/7/2024 sent 3/5/24-4/22/24  Visit #/Visits authorized: 20/20   Testing:MedX testing visit 2    Time In: 8:20 am  Time Out: 9:40 am  Total Billable Time: 80 minutes  INSURANCE and OUTCOMES: Fee for Service with FOTO Outcomes 3/3     Precautions: standard, diabetes, Hx of R TKA     Pattern of pain determined: 2    Subjective   Vivienne reports minimal back stiffness and fatigue without c/o pain currently. States that she tried HEP established last session and she is successful.  She plans to go to gym on Thursdays with .  She feels ready for DC.  She is functioning well at home.  Reviewed ergonomics and she can still make some improvements with chair. She doesn't want to do wellness but she feels confident with her program.    Patient reports tolerating previous visit ; Muscular soreness  Patient reports their pain to be 0/10 on a 0-10 scale with 0 being no pain and 10 being the worst pain imaginable.  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   Pt goals: improve standing and walking tolerance, walk for exercise     Objective      MOVEMENT LOSS - Lumbar    Norms ROM Loss Initial ROM 3/14/24   Flexion Fingers touch toes, sacral angle >/= 70 deg, uniform spinal curvature, posterior weight shift   minimal loss Within functional limits   Extension ASIS surpasses toes, spine of scapulae surpasses heels, uniform spinal curve moderate loss min loss   Side glide Right   minimal loss minimal loss   Side glide Left   minimal loss minimal loss   Rotation Right PT observes contralateral shoulder moderate loss minimal loss   Rotation Left PT observes contralateral shoulder moderate loss minimal loss      Lumbar  Isometric Testing on Med X equipment: Testing administered by PT    Test Initial Baseline Midpoint Final   Date 12/14/23 1/25/24 3/14/24   ROM 0-42 deg 0-48 0-54   Max Peak Torque 142  203 261   Min Peak Torque 71  78 79   Flex/Ext Ratio 2:1 2.3/1 3/1   % below normative data 9 48% above 81% above   % gain from initial test Not available visit 1 27% gain 49%     Outcomes:  Intake Score: 51% functional ability  Visit 6 Score: 53%  Visit 10 Score: 53%  Discharge Score:  60% function  Goal Score: 60% functional ability    MET        Treatment     Vivienne received the treatments listed below:      Vivienne received neuromuscular education  to isolate and engage spinal stabilization musculature correctly for motor control and coordination to aid in function and posture for 10 minutes on the Medical Medx Machine.  Patient performed MedX dynamic exercise with emphasis on spinal muscular control using pacer throughout  active range of motion. Therapist assisted patient in achieving optimal exertion for neural reeducation and endurance training by using the  [place] Exertion Rating scale, by instructing the patient to aim for mid range of exertion, performing 15-20 repetitions, slowly, correctly,and safely.    Testing performed with safe instructions, tolerated well, results shared        3/14/2024    10:06 AM   [facility]   Visit Number 20   VAS Pain Rating 0   Time 5   Lumbar Stretches - Slouch Overcorrection 10   Extension in Standing 10   Flexion in Lying 10   Lumbar Flexion 54   Lumbar Extension 0   Lumbar Peak  Torque 261 ft. lbs.   Min Torque 79   Test Percent Gain in Strength from Initial  49 %   Ice - Sitting 5 mins.       Vivienne participated in therapeutic exercises to develop strength, ROM, and core stabilization for 50 minutes including:    LTR x10  Hip ER stretch 3 x 10 sec   DKTC  w/T-ball support x 10  Supine PPT 5 sec hold x10-NP  TrA activation  + SLR x 15  BKFO BTB x10  Bridges +BTB x20  SOC x10  seated thoracic extension  x 10  EIS x10  seated Open book x 10  Paloff press with 10# x 15  Sit<->Stand from chair x 10 without UE assist  Standing ex's with support   Marching in place x 10   Hip abd x 10    Hip ext x 10                                                  MY EXERCISE PLAN  GO TO STRETCHES  1/day (like brushing your teeth) STRENGTHENING  2-3 times/week CARDIO PROGRAM     Lying down, roll legs side to side  Use towel to pull legs up to chest bridging walk   Sitting  -turn side to side  -slouch correct  -chest to dev (stretch back over chair  -hip stretch Tighten stomach and lift leg bike   Standing  -stretch back backwards  -march  -lift legs out to the side  -lift legs back behind you Sit and push back backwards into chair for 5 sec, do 10      Sit to stand         Peripheral muscle strengthening which included one set of 15-20 repetitions at a slow and controlled 10-13 second per rep pace focused on strengthening supporting musculature in order to improve body mechanics and functional mobility. Patient and therapist focused on proper form during treatment to ensure optimal strengthening of each targeted muscle group.  Machines utilized included: Torso rotation, leg press, hip abd/add and leg extension Leg curls, bicep curls, tricep extension, chest press (Tband) and rows.       Vivienne received manual therapy techniques for 00  minutes. The following activities were included:    Pt given cold pack for 5 minutes to low back     Patient Education and Home Exercises   Home exercises include:                                                  MY EXERCISE PLAN  GO TO STRETCHES  1/day (like brushing your teeth) STRENGTHENING  2-3 times/week CARDIO PROGRAM     Lying down, roll legs side to side  Use towel to pull legs up to chest bridging walk   Sitting  -turn side to side  -slouch correct  -chest to dev (stretch back over chair  -hip stretch Tighten stomach and lift leg bike   Standing  -stretch back backwards  -march  -lift legs out to the side  -lift legs back behind you Sit and push back backwards into chair for 5 sec, do 10      Sit to stand         Cardio program (V5): - revisited 3/14/24 as she is not doing, encouraged walking at home with fitbit, she plans to go with  to gym  Lifting education (V11): -  done  Posture/Lumbar roll: discussed chair ergonomics at length 3/14/24 and suggested pictures brought in  Fridge Magnet Discharge handout (date given): -  3/5/24, work with it over next few visits  Equipment at home/gym membership: [employer]    Education provided:   - Cues with exercises  - MedX performance  - Precor ex performance.   - reviewed cardio as walking is goal, and encouraged walking at home with fitbit - and fridge magnet discharge    Written Home Exercises Provided: yes.Added standing ex's with UE support: Marching, hip abd, hip extension along with functional sit<->stand strengthening. (Provided with copy-unable to copy to EMR patient instructions)  See Patient Instructions for updated HEP. Exercises were reviewed and Vivienne was able to demonstrate them prior to the end of the session.  Vivienne demonstrated fair understanding of the education provided.     See EMR under Patient Instructions for exercises provided prior visit.    Assessment   Patient has attended 20 visits of the [facility] program focusing aerobic training, isometric testing with dynamic strengthening on MedX machine for spine, whole body strengthening on peripheral strengthening equipment, HEP, and patient education. Patient  has completed the [facility] Program and is ready to be transitioned into wellness program. Educated on the importance of wellness program and attending weekly in order to maintain strength. Stressed the importance of continuing exercise and maintaining proper body mechanics and ergonomics in order to fully participate in activities of daily living, work, and leisure activities. At this time, patient demonstrates improvement in ability to reduce symptoms, improved posture, improved spinal ROM and improved strength. Discharge handout with HEP given and reviewed all information given. Patient able to demonstrate and verbalize understanding. Patient does not plan to attend wellness and is going to exercise at gym    -Improved posture and she is using pillow, not  using lumbar roll.  Encouraged 3/14/24  -Improved lumbar ROM, initially on MedX test 0-42 and currently 0-54.  -Improved strength at each test point on lumbar med ex IM test with 49% average improvement with reduced pain noted by patient.  -Initial outcome tool score 51%  and current outcome tool score 60% indicating reduced pain and improved function.      Anticipated Barriers for therapy: none  Pt's spiritual, cultural and educational needs considered and pt agreeable to plan of care and goals as stated below:     Goals:   Short term goals:  6 weeks or 10 visits   - Pt will demonstrate increased lumbar MedX ROM by at least 3 degrees from the initial ROM value with improvements noted in functional ROM and ability to perform ADLs. (MET 1/25/24)  - Pt will demonstrate increased MedX average isometric strength value by 20% from initial test resulting in improved ability to perform bending, lifting, and carrying activities safely, confidently. (MET 1/25/24)  - Pt will report a reduction in worst pain score by 2-3 points for improved tolerance for standing. (MET 3/14/24)  - Pt able to perform HEP correctly with minimal cueing or supervision from therapist to  encourage independent management of symptoms.(MET 3/14/24)     Long term goals: 10 weeks or 20 visits   - Pt will demonstrate increased lumbar MedX ROM by at least 9 degrees from initial ROM value, resulting in improved ability to perform functional forward bending while standing and sitting. (MET 3/14/24)  - Pt will demonstrate increased MedX average isometric strength value by 40% from initial test resulting in improved ability to perform bending, lifting, and carrying activities safely and confidently.(MET 3/14/24)  - Pt to demonstrate ability to independently control and reduce their pain through posture positioning and mechanical movements throughout a typical day. Appropriate and Ongoing  - Pt will demonstrate reduced pain and improved functional outcomes as reported on the FOTO by reaching an intake score of >/= 60% functional ability in order to demonstrate subjective improvement in patient's condition. . (MET 3/14/24)  - Pt will demonstrate independence with the HEP at discharge.(MET 3/14/24)  - Pt will be able to stand at least 30 minutes while doing household tasks. (patient goal) (MET 3/14/24)    Plan   Discharge on HEP.  Education complete    Therapist: Sweetie Leon, PT

## 2024-03-13 NOTE — PATIENT INSTRUCTIONS
Type II diabetes:  Stop ozempic.   Start rybelsus at 7 mg. Take this for one month. If you are tolerating it well, call or message me and we will increase at that time to 14 mg.    Hypertension:  Increase valsartan to twice daily dosing  Check BP daily. Your goal is BP <130/80. If above goal despite increasing the med dose, let me know

## 2024-03-14 ENCOUNTER — CLINICAL SUPPORT (OUTPATIENT)
Dept: REHABILITATION | Facility: HOSPITAL | Age: 74
End: 2024-03-14
Payer: MEDICARE

## 2024-03-14 DIAGNOSIS — R29.3 POOR POSTURE: ICD-10-CM

## 2024-03-14 DIAGNOSIS — M25.69 DECREASED RANGE OF MOTION OF TRUNK AND BACK: Primary | ICD-10-CM

## 2024-03-14 DIAGNOSIS — R29.898 DECREASED STRENGTH OF TRUNK AND BACK: ICD-10-CM

## 2024-03-14 PROCEDURE — 97110 THERAPEUTIC EXERCISES: CPT | Performed by: PHYSICAL MEDICINE & REHABILITATION

## 2024-03-14 PROCEDURE — 97112 NEUROMUSCULAR REEDUCATION: CPT | Performed by: PHYSICAL MEDICINE & REHABILITATION

## 2024-03-21 ENCOUNTER — TELEPHONE (OUTPATIENT)
Dept: FAMILY MEDICINE | Facility: CLINIC | Age: 74
End: 2024-03-21
Payer: MEDICARE

## 2024-03-22 DIAGNOSIS — E03.9 HYPOTHYROIDISM, UNSPECIFIED TYPE: ICD-10-CM

## 2024-03-22 DIAGNOSIS — K21.9 GASTROESOPHAGEAL REFLUX DISEASE WITHOUT ESOPHAGITIS: ICD-10-CM

## 2024-03-22 RX ORDER — OMEPRAZOLE 20 MG/1
CAPSULE, DELAYED RELEASE ORAL
Qty: 90 CAPSULE | Refills: 3 | Status: SHIPPED | OUTPATIENT
Start: 2024-03-22

## 2024-03-22 NOTE — TELEPHONE ENCOUNTER
Refill Routing Note   Medication(s) are not appropriate for processing by [facility] for the following reason(s):        Required labs outdated    [facility] action(s):  Approve  Defer               Appointments  past 12m or future 3m with PCP    Date Provider   Last Visit   3/13/2024 Aislinn Magallon MD   Next Visit   Visit date not found Aislinn Magallon MD   ED visits in past 90 days: 0        Note composed:6:53 PM 03/22/2024

## 2024-03-25 RX ORDER — LEVOTHYROXINE SODIUM 75 UG/1
TABLET ORAL
Qty: 90 TABLET | Refills: 0 | Status: SHIPPED | OUTPATIENT
Start: 2024-03-25 | End: 2024-06-10

## 2024-04-02 ENCOUNTER — CLINICAL SUPPORT (OUTPATIENT)
Dept: ALLERGY | Facility: CLINIC | Age: 74
End: 2024-04-02
Payer: MEDICARE

## 2024-04-02 DIAGNOSIS — Z79.4 TYPE 2 DIABETES MELLITUS WITH STAGE 3A CHRONIC KIDNEY DISEASE, WITH LONG-TERM CURRENT USE OF INSULIN: ICD-10-CM

## 2024-04-02 DIAGNOSIS — E11.22 TYPE 2 DIABETES MELLITUS WITH STAGE 3A CHRONIC KIDNEY DISEASE, WITH LONG-TERM CURRENT USE OF INSULIN: ICD-10-CM

## 2024-04-02 DIAGNOSIS — J30.9 CHRONIC ALLERGIC RHINITIS: Primary | ICD-10-CM

## 2024-04-02 DIAGNOSIS — E11.22 TYPE 2 DIABETES MELLITUS WITH STAGE 3 CHRONIC KIDNEY DISEASE, WITH LONG-TERM CURRENT USE OF INSULIN: ICD-10-CM

## 2024-04-02 DIAGNOSIS — Z79.4 TYPE 2 DIABETES MELLITUS WITH STAGE 3 CHRONIC KIDNEY DISEASE, WITH LONG-TERM CURRENT USE OF INSULIN: ICD-10-CM

## 2024-04-02 DIAGNOSIS — N18.30 TYPE 2 DIABETES MELLITUS WITH STAGE 3 CHRONIC KIDNEY DISEASE, WITH LONG-TERM CURRENT USE OF INSULIN: ICD-10-CM

## 2024-04-02 DIAGNOSIS — N18.31 TYPE 2 DIABETES MELLITUS WITH STAGE 3A CHRONIC KIDNEY DISEASE, WITH LONG-TERM CURRENT USE OF INSULIN: ICD-10-CM

## 2024-04-02 PROCEDURE — 95115 IMMUNOTHERAPY ONE INJECTION: CPT | Mod: HCNC,S$GLB,, | Performed by: ALLERGY & IMMUNOLOGY

## 2024-04-02 NOTE — TELEPHONE ENCOUNTER
No care due was identified.  Health [facility] Embedded Care Due Messages. Reference number: [id number].   4/02/2024 8:10:43 AM CDT

## 2024-04-03 ENCOUNTER — PATIENT MESSAGE (OUTPATIENT)
Dept: ADMINISTRATIVE | Facility: OTHER | Age: 74
End: 2024-04-03
Payer: MEDICARE

## 2024-04-03 RX ORDER — METFORMIN HYDROCHLORIDE 1000 MG/1
TABLET ORAL
Qty: 180 TABLET | Refills: 1 | Status: SHIPPED | OUTPATIENT
Start: 2024-04-03

## 2024-04-03 RX ORDER — GLIPIZIDE 5 MG/1
TABLET ORAL
Qty: 180 TABLET | Refills: 1 | Status: SHIPPED | OUTPATIENT
Start: 2024-04-03

## 2024-04-03 NOTE — TELEPHONE ENCOUNTER
Refill Decision Note   Vivienne Jose  is requesting a refill authorization.  Brief Assessment and Rationale for Refill:  Approve     Medication Therapy Plan:         Pharmacist review requested: Yes   Extended chart review required: Yes   Comments:     Note composed:12:32 PM 04/03/2024

## 2024-04-03 NOTE — TELEPHONE ENCOUNTER
Refill Routing Note   Medication(s) are not appropriate for processing by [facility] for the following reason(s):        Drug-disease interaction    [facility] action(s):  Approve  Defer      Medication Therapy Plan: Type 2 diabetes mellitus with stage 3 chronic kidney disease, with long-term current use of insulin; Type 2 diabetes mellitus with stage 3a chronic kidney disease, with long-term current use of insulin    Pharmacist review requested: Yes     Appointments  past 12m or future 3m with PCP    Date Provider   Last Visit   3/13/2024 Aislinn Magallon MD   Next Visit   Visit date not found Aislinn Magallon MD   ED visits in past 90 days: 0        Note composed:11:36 AM 04/03/2024

## 2024-04-04 ENCOUNTER — PATIENT MESSAGE (OUTPATIENT)
Dept: ADMINISTRATIVE | Facility: OTHER | Age: 74
End: 2024-04-04
Payer: MEDICARE

## 2024-04-04 DIAGNOSIS — M25.539 PAIN IN WRIST, UNSPECIFIED LATERALITY: Primary | ICD-10-CM

## 2024-04-09 ENCOUNTER — OFFICE VISIT (OUTPATIENT)
Dept: PODIATRY | Facility: CLINIC | Age: 74
End: 2024-04-09
Payer: MEDICARE

## 2024-04-09 ENCOUNTER — TELEPHONE (OUTPATIENT)
Dept: PODIATRY | Facility: CLINIC | Age: 74
End: 2024-04-09

## 2024-04-09 ENCOUNTER — LAB VISIT (OUTPATIENT)
Dept: LAB | Facility: HOSPITAL | Age: 74
End: 2024-04-09
Attending: INTERNAL MEDICINE
Payer: MEDICARE

## 2024-04-09 ENCOUNTER — PATIENT MESSAGE (OUTPATIENT)
Dept: INTERNAL MEDICINE | Facility: CLINIC | Age: 74
End: 2024-04-09
Payer: MEDICARE

## 2024-04-09 VITALS
HEIGHT: 62 IN | SYSTOLIC BLOOD PRESSURE: 123 MMHG | HEART RATE: 72 BPM | WEIGHT: 238.13 LBS | RESPIRATION RATE: 18 BRPM | TEMPERATURE: 98 F | BODY MASS INDEX: 43.82 KG/M2 | DIASTOLIC BLOOD PRESSURE: 67 MMHG

## 2024-04-09 DIAGNOSIS — E11.42 TYPE 2 DIABETES MELLITUS WITH PERIPHERAL NEUROPATHY: Primary | ICD-10-CM

## 2024-04-09 DIAGNOSIS — E11.42 DIABETIC POLYNEUROPATHY ASSOCIATED WITH TYPE 2 DIABETES MELLITUS: Primary | ICD-10-CM

## 2024-04-09 DIAGNOSIS — E11.40 PAINFUL DIABETIC NEUROPATHY: ICD-10-CM

## 2024-04-09 DIAGNOSIS — K74.69 OTHER CIRRHOSIS OF LIVER: ICD-10-CM

## 2024-04-09 DIAGNOSIS — E66.01 MORBID OBESITY WITH BODY MASS INDEX (BMI) OF 40.0 TO 44.9 IN ADULT: ICD-10-CM

## 2024-04-09 LAB
ALBUMIN SERPL BCP-MCNC: 3.3 G/DL (ref 3.5–5.2)
ALP SERPL-CCNC: 88 U/L (ref 55–135)
ALT SERPL W/O P-5'-P-CCNC: 30 U/L (ref 10–44)
ANION GAP SERPL CALC-SCNC: 12 MMOL/L (ref 8–16)
AST SERPL-CCNC: 37 U/L (ref 10–40)
BILIRUB SERPL-MCNC: 0.4 MG/DL (ref 0.1–1)
BUN SERPL-MCNC: 17 MG/DL (ref 8–23)
CALCIUM SERPL-MCNC: 10.3 MG/DL (ref 8.7–10.5)
CHLORIDE SERPL-SCNC: 103 MMOL/L (ref 95–110)
CO2 SERPL-SCNC: 25 MMOL/L (ref 23–29)
CREAT SERPL-MCNC: 1 MG/DL (ref 0.5–1.4)
EST. GFR  (NO RACE VARIABLE): 59.9 ML/MIN/1.73 M^2
GLUCOSE SERPL-MCNC: 155 MG/DL (ref 70–110)
POTASSIUM SERPL-SCNC: 4.4 MMOL/L (ref 3.5–5.1)
PROT SERPL-MCNC: 7.7 G/DL (ref 6–8.4)
SODIUM SERPL-SCNC: 140 MMOL/L (ref 136–145)

## 2024-04-09 PROCEDURE — 11719 TRIM NAIL(S) ANY NUMBER: CPT | Mod: Q9,S$GLB,, | Performed by: STUDENT IN AN ORGANIZED HEALTH CARE EDUCATION/TRAINING PROGRAM

## 2024-04-09 PROCEDURE — 3072F LOW RISK FOR RETINOPATHY: CPT | Mod: CPTII,S$GLB,, | Performed by: STUDENT IN AN ORGANIZED HEALTH CARE EDUCATION/TRAINING PROGRAM

## 2024-04-09 PROCEDURE — 1125F AMNT PAIN NOTED PAIN PRSNT: CPT | Mod: CPTII,S$GLB,, | Performed by: STUDENT IN AN ORGANIZED HEALTH CARE EDUCATION/TRAINING PROGRAM

## 2024-04-09 PROCEDURE — 99999 PR PBB SHADOW E&M-EST. PATIENT-LVL III: CPT | Mod: PBBFAC,,, | Performed by: STUDENT IN AN ORGANIZED HEALTH CARE EDUCATION/TRAINING PROGRAM

## 2024-04-09 PROCEDURE — 3066F NEPHROPATHY DOC TX: CPT | Mod: CPTII,S$GLB,, | Performed by: STUDENT IN AN ORGANIZED HEALTH CARE EDUCATION/TRAINING PROGRAM

## 2024-04-09 PROCEDURE — 3061F NEG MICROALBUMINURIA REV: CPT | Mod: CPTII,S$GLB,, | Performed by: STUDENT IN AN ORGANIZED HEALTH CARE EDUCATION/TRAINING PROGRAM

## 2024-04-09 PROCEDURE — 1157F ADVNC CARE PLAN IN RCRD: CPT | Mod: CPTII,S$GLB,, | Performed by: STUDENT IN AN ORGANIZED HEALTH CARE EDUCATION/TRAINING PROGRAM

## 2024-04-09 PROCEDURE — 3008F BODY MASS INDEX DOCD: CPT | Mod: CPTII,S$GLB,, | Performed by: STUDENT IN AN ORGANIZED HEALTH CARE EDUCATION/TRAINING PROGRAM

## 2024-04-09 PROCEDURE — 3074F SYST BP LT 130 MM HG: CPT | Mod: CPTII,S$GLB,, | Performed by: STUDENT IN AN ORGANIZED HEALTH CARE EDUCATION/TRAINING PROGRAM

## 2024-04-09 PROCEDURE — 3078F DIAST BP <80 MM HG: CPT | Mod: CPTII,S$GLB,, | Performed by: STUDENT IN AN ORGANIZED HEALTH CARE EDUCATION/TRAINING PROGRAM

## 2024-04-09 PROCEDURE — 3051F HG A1C>EQUAL 7.0%<8.0%: CPT | Mod: CPTII,S$GLB,, | Performed by: STUDENT IN AN ORGANIZED HEALTH CARE EDUCATION/TRAINING PROGRAM

## 2024-04-09 PROCEDURE — 99213 OFFICE O/P EST LOW 20 MIN: CPT | Mod: 25,S$GLB,, | Performed by: STUDENT IN AN ORGANIZED HEALTH CARE EDUCATION/TRAINING PROGRAM

## 2024-04-09 PROCEDURE — 80053 COMPREHEN METABOLIC PANEL: CPT | Mod: HCNC | Performed by: INTERNAL MEDICINE

## 2024-04-09 PROCEDURE — 36415 COLL VENOUS BLD VENIPUNCTURE: CPT | Performed by: INTERNAL MEDICINE

## 2024-04-09 PROCEDURE — 4010F ACE/ARB THERAPY RXD/TAKEN: CPT | Mod: CPTII,S$GLB,, | Performed by: STUDENT IN AN ORGANIZED HEALTH CARE EDUCATION/TRAINING PROGRAM

## 2024-04-09 RX ORDER — ORAL SEMAGLUTIDE 14 MG/1
14 TABLET ORAL DAILY
Qty: 90 TABLET | Refills: 3 | Status: SHIPPED | OUTPATIENT
Start: 2024-04-09

## 2024-04-09 NOTE — PROCEDURES
"Routine Foot Care    Date/Time: 4/9/2024 10:00 AM    Performed by: Jose Pickett DPM  Authorized by: Jose Pickett DPM    Time out: Immediately prior to procedure a "time out" was called to verify the correct patient, procedure, equipment, support staff and site/side marked as required.    Consent Done?:  Yes (Verbal)  Hyperkeratotic Skin Lesions?: No      Nail Care Type:  Trim  Location(s): All  (Left 1st Toe, Left 3rd Toe, Left 2nd Toe, Left 4th Toe, Left 5th Toe, Right 1st Toe, Right 2nd Toe, Right 3rd Toe, Right 4th Toe and Right 5th Toe)  Patient tolerance:  Patient tolerated the procedure well with no immediate complications    "

## 2024-04-09 NOTE — PROGRESS NOTES
Subjective:     Patient    Vivienne Jose is a 73 y.o. female.    Problem    01/09/24: Previously followed by Rebecca Ohara and Abhay, last seen 1 year ago. Had both 1st toenails removed years ago due to ingrown/fungal nails, still has occasional spicules that bother her. Has sensitivity throughout her 1st toes and feet; has numbness, tingling, burning, shooting, cramping. On gabapentin 1200 mg/day which does improve her nerve pain and does not make her sleepy that she can tell; previously tried pregabalin which made her bones hurt. Sleepy at baseline.      02/09/24: Started duloxetine 20 mg/day at last visit for severe BLE nerve pain/symptoms, with some improvement although it may have caused intention tremors in her hands and may be causing dry mouth; also had a couple other medication changes recently so she is unsure which medications may be causing which side effects. Also on gabapentin 1800 mg/day. Has some ongoing nerve symptoms.     Primary Care Provider    Primary Care Provider: Aislinn Magallon MD   Last Seen: 3/13/2024     History    History obtained from patient and review of medical records.     Past Medical History:   Diagnosis Date    Allergy     Angio-edema     Arthritis     Cataract     bilateral - not removed    Cerebrovascular malformation     Cirrhosis 11/24/2020    Colon polyps     Diabetes mellitus, type 2     Diabetic peripheral neuropathy     Edema of both feet 8/19/2022    GERD (gastroesophageal reflux disease)     Herpes infection     Hypothyroidism     Kidney stones     Mild nonproliferative diabetic retinopathy of 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 edema associated with type 2 diabetes mellitus 4/18/2019    DEL RIO (nonalcoholic steatohepatitis) 8/19/2022    Nausea & vomiting 11/23/2015    Obesity, morbid     Ovarian cyst     Seizure disorder, focal motor     Sleep apnea     Type II or unspecified type diabetes mellitus with neurological manifestations, uncontrolled(250.62)     Urticaria         Past Surgical History:   Procedure Laterality Date    CARPAL TUNNEL RELEASE Right 2014    CATARACT EXTRACTION W/  INTRAOCULAR LENS IMPLANT Right 9/19/2023    Procedure: EXTRACTION, CATARACT, WITH IOL INSERTION;  Surgeon: Lyndon Ortiz MD;  Location: [facility] OR;  Service: Ophthalmology;  Laterality: Right;    CATARACT EXTRACTION W/  INTRAOCULAR LENS IMPLANT Left 10/3/2023    Procedure: EXTRACTION, CATARACT, WITH IOL INSERTION;  Surgeon: Lyndon Ortiz MD;  Location: [facility] OR;  Service: Ophthalmology;  Laterality: Left;    COLONOSCOPY      COLONOSCOPY N/A 9/13/2017    Procedure: COLONOSCOPY Golytely;  Surgeon: Gisela Wall MD;  Location: [facility];  Service: Endoscopy;  Laterality: N/A;    COLONOSCOPY N/A 9/9/2022    Procedure: COLONOSCOPY Extended Suprep;  Surgeon: Britt Jasso MD;  Location: [facility];  Service: Endoscopy;  Laterality: N/A;    CYST REMOVAL      skin; multiples    ESOPHAGOGASTRODUODENOSCOPY Left 10/15/2021    Procedure: EGD (ESOPHAGOGASTRODUODENOSCOPY);  Surgeon: Luis Fernando Taveras MD;  Location: [facility] (4TH FLR);  Service: Endoscopy;  Laterality: Left;  cirrhosis labwork am of procedure  last seizure 1973  COVID test on 10/12/21 at [facility]    ESOPHAGOGASTRODUODENOSCOPY N/A 3/10/2023    Procedure: EGD (ESOPHAGOGASTRODUODENOSCOPY);  Surgeon: Christa Caldera MD;  Location: [facility] (2ND FLR);  Service: Endoscopy;  Laterality: N/A;  Medically Urgent  cirrohsis-labs done on 2/9/23 (< 90 days)  New onset A-fib  3/1 instructions to portal-st  Precall complete- [place]    EXTRACORPOREAL SHOCK WAVE LITHOTRIPSY  2002    HYSTERECTOMY  1984    JOINT REPLACEMENT Right 03/06/2019    knee    KIDNEY STONE SURGERY      KNEE ARTHROPLASTY Right 3/6/2019    Procedure: ARTHROPLASTY, KNEE;  Surgeon: Pj Gamboa MD;  Location: [facility];  Service: Orthopedics;  Laterality: Right;  Depuy (Stephen notified 2/21, CC)    THYROIDECTOMY  1977         TONSILLECTOMY, ADENOIDECTOMY       TRIGGER FINGER RELEASE      TUBAL LIGATION          Objective:     Vitals  Wt Readings from Last 1 Encounters:   24 108 kg (238 lb 1.6 oz)     Temp Readings from Last 1 Encounters:   24 98.2 °F (36.8 °C)     BP Readings from Last 1 Encounters:   24 123/67     Pulse Readings from Last 1 Encounters:   24 72       Dermatological Exam    Skin:  Pedal hair growth diminished and Pedal skin thin and shiny on right  Pedal hair growth diminished and Pedal skin thin and shiny on left     Nails:  10 nail(s) elongated    Vascular Exam    Arteries:  Posterior tibial artery palpable on right  Dorsalis pedis artery palpable on right  Posterior tibial artery palpable on left  Dorsalis pedis artery palpable on left    Veins:  Superficial veins unremarkable on right  Superficial veins unremarkable on left    Swellin+ nonpitting on right  1+ nonpitting on left    Neurological Exam    Sunset touch test:  6/6 sites sensed, light touch intact     Musculoskeletal Exam    Footwear:  Casual on right  Casual on left    Gait Exam:   Ambulatory Status: Ambulatory  Gait: Normal  Assistive Devices: None    Foot Progression Angle:  Normal on right  Normal on left     Right Lower Extremity Additional Findings:  Right foot and ankle function, strength, and range of motion unremarkable except as noted above.     Left Lower Extremity Additional Findings:  Left foot and ankle function, strength, and range of motion unremarkable except as noted above.    Imaging and Other Tests    Imaging:  Independently reviewed and interpreted imaging, findings are as follows: N/A    Other Tests: The following A1c results were reviewed.   Hemoglobin A1C   Date Value Ref Range Status   2024 7.2 (H) 4.0 - 5.6 % Final     Comment:     ADA Screening Guidelines:  5.7-6.4%  Consistent with prediabetes  >or=6.5%  Consistent with diabetes    High levels of fetal hemoglobin interfere with the HbA1C  assay. Heterozygous hemoglobin variants  (HbS, HgC, etc)do  not significantly interfere with this assay.   However, presence of multiple variants may affect accuracy.     08/17/2023 6.7 (H) 4.0 - 5.6 % Final     Comment:     ADA Screening Guidelines:  5.7-6.4%  Consistent with prediabetes  >or=6.5%  Consistent with diabetes    High levels of fetal hemoglobin interfere with the HbA1C  assay. Heterozygous hemoglobin variants (HbS, HgC, etc)do  not significantly interfere with this assay.   However, presence of multiple variants may affect accuracy.     02/09/2023 6.3 (H) 4.0 - 5.6 % Final     Comment:     ADA Screening Guidelines:  5.7-6.4%  Consistent with prediabetes  >or=6.5%  Consistent with diabetes    High levels of fetal hemoglobin interfere with the HbA1C  assay. Heterozygous hemoglobin variants (HbS, HgC, etc)do  not significantly interfere with this assay.   However, presence of multiple variants may affect accuracy.           Assessment:     Encounter Diagnoses   Name Primary?    Type 2 diabetes mellitus with peripheral neuropathy     Painful diabetic neuropathy Yes          Plan:     I counseled the patient on her conditions, their implications and medical management.    Diabetic foot with peripheral neuropathy: chronic stable  -Diabetic foot exam performed.  -Performed shoe inspection and diabetic foot education. Reviewed importance of blood glucose control, proper nutrition, and foot hygiene to minimize risk of complications of diabetes. Recommended daily foot inspections, daily moisturizer to feet, avoiding sharp instruments to feet, appropriate footwear at all times when ambulating, and following up regularly for routine foot care.   -Diabetic foot risk: 2023 IWGDF very low ulcer risk.   -Next foot exam due January 2025.  -Continue gabapentin 1800 mg/day and duloxetine 20 mg/day.    -Ordered Qutenza patches for both feet.   -Nails trimmed x10, see procedure note.       Return to clinic in 2-4 weeks for nerve pain, call sooner PRN.

## 2024-04-23 ENCOUNTER — OFFICE VISIT (OUTPATIENT)
Dept: PODIATRY | Facility: CLINIC | Age: 74
End: 2024-04-23
Payer: MEDICARE

## 2024-04-23 VITALS
SYSTOLIC BLOOD PRESSURE: 125 MMHG | DIASTOLIC BLOOD PRESSURE: 76 MMHG | HEART RATE: 77 BPM | TEMPERATURE: 98 F | BODY MASS INDEX: 43.82 KG/M2 | WEIGHT: 238.13 LBS | HEIGHT: 62 IN

## 2024-04-23 DIAGNOSIS — E11.40 PAINFUL DIABETIC NEUROPATHY: Primary | ICD-10-CM

## 2024-04-23 PROCEDURE — 1159F MED LIST DOCD IN RCRD: CPT | Mod: CPTII,S$GLB,, | Performed by: STUDENT IN AN ORGANIZED HEALTH CARE EDUCATION/TRAINING PROGRAM

## 2024-04-23 PROCEDURE — 4010F ACE/ARB THERAPY RXD/TAKEN: CPT | Mod: CPTII,S$GLB,, | Performed by: STUDENT IN AN ORGANIZED HEALTH CARE EDUCATION/TRAINING PROGRAM

## 2024-04-23 PROCEDURE — 1101F PT FALLS ASSESS-DOCD LE1/YR: CPT | Mod: CPTII,S$GLB,, | Performed by: STUDENT IN AN ORGANIZED HEALTH CARE EDUCATION/TRAINING PROGRAM

## 2024-04-23 PROCEDURE — 3066F NEPHROPATHY DOC TX: CPT | Mod: CPTII,S$GLB,, | Performed by: STUDENT IN AN ORGANIZED HEALTH CARE EDUCATION/TRAINING PROGRAM

## 2024-04-23 PROCEDURE — 3072F LOW RISK FOR RETINOPATHY: CPT | Mod: CPTII,S$GLB,, | Performed by: STUDENT IN AN ORGANIZED HEALTH CARE EDUCATION/TRAINING PROGRAM

## 2024-04-23 PROCEDURE — 99213 OFFICE O/P EST LOW 20 MIN: CPT | Mod: S$GLB,,, | Performed by: STUDENT IN AN ORGANIZED HEALTH CARE EDUCATION/TRAINING PROGRAM

## 2024-04-23 PROCEDURE — 99999 PR PBB SHADOW E&M-EST. PATIENT-LVL V: CPT | Mod: PBBFAC,,, | Performed by: STUDENT IN AN ORGANIZED HEALTH CARE EDUCATION/TRAINING PROGRAM

## 2024-04-23 PROCEDURE — 3074F SYST BP LT 130 MM HG: CPT | Mod: CPTII,S$GLB,, | Performed by: STUDENT IN AN ORGANIZED HEALTH CARE EDUCATION/TRAINING PROGRAM

## 2024-04-23 PROCEDURE — 3008F BODY MASS INDEX DOCD: CPT | Mod: CPTII,S$GLB,, | Performed by: STUDENT IN AN ORGANIZED HEALTH CARE EDUCATION/TRAINING PROGRAM

## 2024-04-23 PROCEDURE — 3061F NEG MICROALBUMINURIA REV: CPT | Mod: CPTII,S$GLB,, | Performed by: STUDENT IN AN ORGANIZED HEALTH CARE EDUCATION/TRAINING PROGRAM

## 2024-04-23 PROCEDURE — 3078F DIAST BP <80 MM HG: CPT | Mod: CPTII,S$GLB,, | Performed by: STUDENT IN AN ORGANIZED HEALTH CARE EDUCATION/TRAINING PROGRAM

## 2024-04-23 PROCEDURE — 3051F HG A1C>EQUAL 7.0%<8.0%: CPT | Mod: CPTII,S$GLB,, | Performed by: STUDENT IN AN ORGANIZED HEALTH CARE EDUCATION/TRAINING PROGRAM

## 2024-04-23 PROCEDURE — 1126F AMNT PAIN NOTED NONE PRSNT: CPT | Mod: CPTII,S$GLB,, | Performed by: STUDENT IN AN ORGANIZED HEALTH CARE EDUCATION/TRAINING PROGRAM

## 2024-04-23 PROCEDURE — 1157F ADVNC CARE PLAN IN RCRD: CPT | Mod: CPTII,S$GLB,, | Performed by: STUDENT IN AN ORGANIZED HEALTH CARE EDUCATION/TRAINING PROGRAM

## 2024-04-23 PROCEDURE — 3288F FALL RISK ASSESSMENT DOCD: CPT | Mod: CPTII,S$GLB,, | Performed by: STUDENT IN AN ORGANIZED HEALTH CARE EDUCATION/TRAINING PROGRAM

## 2024-04-23 NOTE — PROGRESS NOTES
Subjective:     Patient    Vivienne Jose is a 73 y.o. female.    Problem    01/09/24: Previously followed by Rebecca Ohara and Abhay, last seen 1 year ago. Had both 1st toenails removed years ago due to ingrown/fungal nails, still has occasional spicules that bother her. Has sensitivity throughout her 1st toes and feet; has numbness, tingling, burning, shooting, cramping. On gabapentin 1200 mg/day which does improve her nerve pain and does not make her sleepy that she can tell; previously tried pregabalin which made her bones hurt. Sleepy at baseline.      02/09/24: Started duloxetine 20 mg/day at last visit for severe BLE nerve pain/symptoms, with some improvement although it may have caused intention tremors in her hands and may be causing dry mouth; also had a couple other medication changes recently so she is unsure which medications may be causing which side effects. Also on gabapentin 1800 mg/day. Has some ongoing nerve symptoms.     04/23/24: Returns for followup of BLE nerve pain/symptoms. Qutenza patches approved. Has decreased gabapentin to 600 mg/day since starting the duloxetine with no worsening of symptoms, also now less sedated. Nerve symptoms are ongoing though.     Primary Care Provider    Primary Care Provider: Aislinn Magallon MD   Last Seen: 3/13/2024     History    History obtained from patient and review of medical records.     Past Medical History:   Diagnosis Date    Allergy     Angio-edema     Arthritis     Cataract     bilateral - not removed    Cerebrovascular malformation     Cirrhosis 11/24/2020    Colon polyps     Diabetes mellitus, type 2     Diabetic peripheral neuropathy     Edema of both feet 8/19/2022    GERD (gastroesophageal reflux disease)     Herpes infection     Hypothyroidism     Kidney stones     Mild nonproliferative diabetic retinopathy of 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 edema associated with type 2 diabetes mellitus 4/18/2019    DE LRIO (nonalcoholic steatohepatitis)  8/19/2022    Nausea & vomiting 11/23/2015    Obesity, morbid     Ovarian cyst     Seizure disorder, focal motor     Sleep apnea     Type II or unspecified type diabetes mellitus with neurological manifestations, uncontrolled(250.62)     Urticaria        Past Surgical History:   Procedure Laterality Date    CARPAL TUNNEL RELEASE Right 2014    CATARACT EXTRACTION W/  INTRAOCULAR LENS IMPLANT Right 9/19/2023    Procedure: EXTRACTION, CATARACT, WITH IOL INSERTION;  Surgeon: Lyndon Ortiz MD;  Location: [facility] OR;  Service: Ophthalmology;  Laterality: Right;    CATARACT EXTRACTION W/  INTRAOCULAR LENS IMPLANT Left 10/3/2023    Procedure: EXTRACTION, CATARACT, WITH IOL INSERTION;  Surgeon: Lyndon Ortiz MD;  Location: [facility] OR;  Service: Ophthalmology;  Laterality: Left;    COLONOSCOPY      COLONOSCOPY N/A 9/13/2017    Procedure: COLONOSCOPY Golytely;  Surgeon: Gisela Wall MD;  Location: [facility];  Service: Endoscopy;  Laterality: N/A;    COLONOSCOPY N/A 9/9/2022    Procedure: COLONOSCOPY Extended Suprep;  Surgeon: Britt Jasso MD;  Location: [facility];  Service: Endoscopy;  Laterality: N/A;    CYST REMOVAL      skin; multiples    ESOPHAGOGASTRODUODENOSCOPY Left 10/15/2021    Procedure: EGD (ESOPHAGOGASTRODUODENOSCOPY);  Surgeon: Luis Fernando Taveras MD;  Location: [facility] (4TH FLR);  Service: Endoscopy;  Laterality: Left;  cirrhosis labwork am of procedure  last seizure 1973  COVID test on 10/12/21 at [facility]    ESOPHAGOGASTRODUODENOSCOPY N/A 3/10/2023    Procedure: EGD (ESOPHAGOGASTRODUODENOSCOPY);  Surgeon: Christa Caldera MD;  Location: [facility] (2ND FLR);  Service: Endoscopy;  Laterality: N/A;  Medically Urgent  cirrohsis-labs done on 2/9/23 (< 90 days)  New onset A-fib  3/1 instructions to portal-st  Precall complete- [place]    EXTRACORPOREAL SHOCK WAVE LITHOTRIPSY  2002    HYSTERECTOMY  1984    JOINT REPLACEMENT Right 03/06/2019    knee    KIDNEY STONE SURGERY      KNEE  ARTHROPLASTY Right 3/6/2019    Procedure: ARTHROPLASTY, KNEE;  Surgeon: Pj Gamboa MD;  Location: [facility];  Service: Orthopedics;  Laterality: Right;  Depuy (Stephen notified )    THYROIDECTOMY  1977         TONSILLECTOMY, ADENOIDECTOMY      TRIGGER FINGER RELEASE      TUBAL LIGATION          Objective:     Vitals  Wt Readings from Last 1 Encounters:   24 108 kg (238 lb 1.6 oz)     Temp Readings from Last 1 Encounters:   24 98.2 °F (36.8 °C) (Oral)     BP Readings from Last 1 Encounters:   24 125/76     Pulse Readings from Last 1 Encounters:   24 77       Dermatological Exam    Skin:  Pedal hair growth diminished and Pedal skin thin and shiny on right  Pedal hair growth diminished and Pedal skin thin and shiny on left     Nails:  10 nail(s) elongated    Vascular Exam    Arteries:  Posterior tibial artery palpable on right  Dorsalis pedis artery palpable on right  Posterior tibial artery palpable on left  Dorsalis pedis artery palpable on left    Veins:  Superficial veins unremarkable on right  Superficial veins unremarkable on left    Swellin+ nonpitting on right  1+ nonpitting on left    Neurological Exam    Victor touch test:  6/6 sites sensed, light touch intact     Musculoskeletal Exam    Footwear:  Casual on right  Casual on left    Gait Exam:   Ambulatory Status: Ambulatory  Gait: Normal  Assistive Devices: None    Foot Progression Angle:  Normal on right  Normal on left     Right Lower Extremity Additional Findings:  Right foot and ankle function, strength, and range of motion unremarkable except as noted above.     Left Lower Extremity Additional Findings:  Left foot and ankle function, strength, and range of motion unremarkable except as noted above.    Imaging and Other Tests    Imaging:  Independently reviewed and interpreted imaging, findings are as follows: N/A    Other Tests: The following A1c results were reviewed.   Hemoglobin A1C   Date Value Ref Range  Status   03/08/2024 7.2 (H) 4.0 - 5.6 % Final     Comment:     ADA Screening Guidelines:  5.7-6.4%  Consistent with prediabetes  >or=6.5%  Consistent with diabetes    High levels of fetal hemoglobin interfere with the HbA1C  assay. Heterozygous hemoglobin variants (HbS, HgC, etc)do  not significantly interfere with this assay.   However, presence of multiple variants may affect accuracy.     08/17/2023 6.7 (H) 4.0 - 5.6 % Final     Comment:     ADA Screening Guidelines:  5.7-6.4%  Consistent with prediabetes  >or=6.5%  Consistent with diabetes    High levels of fetal hemoglobin interfere with the HbA1C  assay. Heterozygous hemoglobin variants (HbS, HgC, etc)do  not significantly interfere with this assay.   However, presence of multiple variants may affect accuracy.     02/09/2023 6.3 (H) 4.0 - 5.6 % Final     Comment:     ADA Screening Guidelines:  5.7-6.4%  Consistent with prediabetes  >or=6.5%  Consistent with diabetes    High levels of fetal hemoglobin interfere with the HbA1C  assay. Heterozygous hemoglobin variants (HbS, HgC, etc)do  not significantly interfere with this assay.   However, presence of multiple variants may affect accuracy.           Assessment:     Encounter Diagnosis   Name Primary?    Painful diabetic neuropathy Yes            Plan:     I counseled the patient on her conditions, their implications and medical management.    Diabetic foot with peripheral neuropathy: chronic stable   -Performed shoe inspection and diabetic foot education. Reviewed importance of blood glucose control, proper nutrition, and foot hygiene to minimize risk of complications of diabetes. Recommended daily foot inspections, daily moisturizer to feet, avoiding sharp instruments to feet, appropriate footwear at all times when ambulating, and following up regularly for routine foot care.   -Diabetic foot risk: 2023 IWGDF very low ulcer risk.   -Next foot exam due January 2025.  -Will return for routine foot care.      Painful diabetic neuropathy: chronic stable  -Continue gabapentin 600 mg/day and duloxetine 20 mg/day.    -Qutenza patches x4 applied to both feet, 30 minute application, removed and cleansed. Ordered next Qutenza patches for both feet.         Return to clinic in 3 months for nerve pain, call sooner PRN.

## 2024-04-24 ENCOUNTER — OFFICE VISIT (OUTPATIENT)
Dept: FAMILY MEDICINE | Facility: CLINIC | Age: 74
End: 2024-04-24
Payer: MEDICARE

## 2024-04-24 VITALS
HEIGHT: 62 IN | OXYGEN SATURATION: 97 % | WEIGHT: 240 LBS | HEART RATE: 88 BPM | DIASTOLIC BLOOD PRESSURE: 60 MMHG | BODY MASS INDEX: 44.16 KG/M2 | SYSTOLIC BLOOD PRESSURE: 110 MMHG

## 2024-04-24 DIAGNOSIS — E11.42 DIABETIC POLYNEUROPATHY ASSOCIATED WITH TYPE 2 DIABETES MELLITUS: ICD-10-CM

## 2024-04-24 DIAGNOSIS — K74.69 OTHER CIRRHOSIS OF LIVER: ICD-10-CM

## 2024-04-24 DIAGNOSIS — N18.31 TYPE 2 DIABETES MELLITUS WITH STAGE 3A CHRONIC KIDNEY DISEASE, WITH LONG-TERM CURRENT USE OF INSULIN: ICD-10-CM

## 2024-04-24 DIAGNOSIS — I15.2 HYPERTENSION ASSOCIATED WITH DIABETES: ICD-10-CM

## 2024-04-24 DIAGNOSIS — I85.10 SECONDARY ESOPHAGEAL VARICES WITHOUT BLEEDING: ICD-10-CM

## 2024-04-24 DIAGNOSIS — I77.9 BILATERAL CAROTID ARTERY DISEASE, UNSPECIFIED TYPE: ICD-10-CM

## 2024-04-24 DIAGNOSIS — E03.9 HYPOTHYROIDISM, UNSPECIFIED TYPE: ICD-10-CM

## 2024-04-24 DIAGNOSIS — E11.3293 MILD NONPROLIFERATIVE DIABETIC RETINOPATHY OF BOTH EYES WITHOUT MACULAR EDEMA ASSOCIATED WITH TYPE 2 DIABETES MELLITUS: ICD-10-CM

## 2024-04-24 DIAGNOSIS — E66.01 MORBID OBESITY WITH BODY MASS INDEX (BMI) OF 40.0 TO 44.9 IN ADULT: ICD-10-CM

## 2024-04-24 DIAGNOSIS — M46.1 SACROILIITIS: ICD-10-CM

## 2024-04-24 DIAGNOSIS — Z00.00 ENCOUNTER FOR PREVENTIVE HEALTH EXAMINATION: Primary | ICD-10-CM

## 2024-04-24 DIAGNOSIS — I70.0 ABDOMINAL AORTIC ATHEROSCLEROSIS: ICD-10-CM

## 2024-04-24 DIAGNOSIS — E11.59 HYPERTENSION ASSOCIATED WITH DIABETES: ICD-10-CM

## 2024-04-24 DIAGNOSIS — E11.22 TYPE 2 DIABETES MELLITUS WITH STAGE 3A CHRONIC KIDNEY DISEASE, WITH LONG-TERM CURRENT USE OF INSULIN: ICD-10-CM

## 2024-04-24 DIAGNOSIS — D69.2 PURPURA: ICD-10-CM

## 2024-04-24 DIAGNOSIS — Z79.4 TYPE 2 DIABETES MELLITUS WITH STAGE 3A CHRONIC KIDNEY DISEASE, WITH LONG-TERM CURRENT USE OF INSULIN: ICD-10-CM

## 2024-04-24 PROCEDURE — 4010F ACE/ARB THERAPY RXD/TAKEN: CPT | Mod: HCNC,CPTII,S$GLB, | Performed by: NURSE PRACTITIONER

## 2024-04-24 PROCEDURE — 3051F HG A1C>EQUAL 7.0%<8.0%: CPT | Mod: HCNC,CPTII,S$GLB, | Performed by: NURSE PRACTITIONER

## 2024-04-24 PROCEDURE — 99999 PR PBB SHADOW E&M-EST. PATIENT-LVL V: CPT | Mod: PBBFAC,HCNC,, | Performed by: NURSE PRACTITIONER

## 2024-04-24 PROCEDURE — 1159F MED LIST DOCD IN RCRD: CPT | Mod: HCNC,CPTII,S$GLB, | Performed by: NURSE PRACTITIONER

## 2024-04-24 PROCEDURE — 1101F PT FALLS ASSESS-DOCD LE1/YR: CPT | Mod: HCNC,CPTII,S$GLB, | Performed by: NURSE PRACTITIONER

## 2024-04-24 PROCEDURE — 1160F RVW MEDS BY RX/DR IN RCRD: CPT | Mod: HCNC,CPTII,S$GLB, | Performed by: NURSE PRACTITIONER

## 2024-04-24 PROCEDURE — 3074F SYST BP LT 130 MM HG: CPT | Mod: HCNC,CPTII,S$GLB, | Performed by: NURSE PRACTITIONER

## 2024-04-24 PROCEDURE — 3066F NEPHROPATHY DOC TX: CPT | Mod: HCNC,CPTII,S$GLB, | Performed by: NURSE PRACTITIONER

## 2024-04-24 PROCEDURE — 3072F LOW RISK FOR RETINOPATHY: CPT | Mod: HCNC,CPTII,S$GLB, | Performed by: NURSE PRACTITIONER

## 2024-04-24 PROCEDURE — 1123F ACP DISCUSS/DSCN MKR DOCD: CPT | Mod: HCNC,CPTII,S$GLB, | Performed by: NURSE PRACTITIONER

## 2024-04-24 PROCEDURE — G0439 PPPS, SUBSEQ VISIT: HCPCS | Mod: HCNC,S$GLB,, | Performed by: NURSE PRACTITIONER

## 2024-04-24 PROCEDURE — 1126F AMNT PAIN NOTED NONE PRSNT: CPT | Mod: HCNC,CPTII,S$GLB, | Performed by: NURSE PRACTITIONER

## 2024-04-24 PROCEDURE — 3078F DIAST BP <80 MM HG: CPT | Mod: HCNC,CPTII,S$GLB, | Performed by: NURSE PRACTITIONER

## 2024-04-24 PROCEDURE — 3061F NEG MICROALBUMINURIA REV: CPT | Mod: HCNC,CPTII,S$GLB, | Performed by: NURSE PRACTITIONER

## 2024-04-24 PROCEDURE — 3288F FALL RISK ASSESSMENT DOCD: CPT | Mod: HCNC,CPTII,S$GLB, | Performed by: NURSE PRACTITIONER

## 2024-04-24 NOTE — PATIENT INSTRUCTIONS
Counseling and Referral of Other Preventative  (Italic type indicates deductible and co-insurance are waived)    Patient Name: Vivienne Jose  Today's Date: 4/24/2024    Health Maintenance       Date Due Completion Date    RSV Vaccine (Age 60+ and Pregnant patients) (1 - 1-dose 60+ series) Never done ---    COVID-19 Vaccine (5 - 2023-24 season) 09/01/2023 8/15/2022    Eye Exam 07/24/2024 7/24/2023    TETANUS VACCINE 06/19/2024 6/19/2014    Hemoglobin A1c 09/08/2024 3/8/2024    Mammogram 10/27/2024 10/27/2023    Diabetes Urine Screening 03/08/2025 3/8/2024    Lipid Panel 03/08/2025 3/8/2024    Foot Exam 04/09/2025 4/9/2024    Override on 1/17/2023: Done    Override on 8/2/2017: Done    Override on 4/28/2015: Done    Aspirin/Antiplatelet Therapy 04/24/2025 4/24/2024    DEXA Scan 06/12/2025 6/12/2023    Colorectal Cancer Screening 09/09/2027 9/9/2022        No orders of the defined types were placed in this encounter.    The following information is provided to all patients.  This information is to help you find resources for any of the problems found today that may be affecting your health:                  Living healthy guide: www.[facility].[place].gov      Understanding Diabetes: www.diabetes.org      Eating healthy: www.cdc.gov/healthyweight      CDC home safety checklist: www.cdc.gov/steadi/patient.html      Agency on Aging: www.goea.[place].[facility]      Alcoholics anonymous (AA): www.aa.org      Physical Activity: www.leno.nih.gov/zr0pqqe      Tobacco use: www.quitwithusla.org

## 2024-04-24 NOTE — PROGRESS NOTES
"  Vivienne Jose presented for a  Medicare AWV and comprehensive Health Risk Assessment today. The following components were reviewed and updated:    Medical history  Family History  Social history  Allergies and Current Medications  Health Risk Assessment  Health Maintenance  Care Team         ** See Completed Assessments for Annual Wellness Visit within the encounter summary.**         The following assessments were completed:  Living Situation  CAGE  Depression Screening  Timed Get Up and Go  Whisper Test  Cognitive Function Screening      Nutrition Screening  ADL Screening  PAQ Screening      Opioid documentation:      Patient does not have a current opioid prescription.        Vitals:    04/24/24 1312   BP: 110/60   BP Location: Left arm   Patient Position: Sitting   BP Method: Large (Manual)   Pulse: 88   SpO2: 97%   Weight: 108.9 kg (240 lb)   Height: 5' 2" (1.575 m)     Body mass index is 43.9 kg/m².    Physical Exam  Vitals reviewed.   Constitutional:       General: She is awake. She is not in acute distress.     Appearance: Normal appearance. She is well-developed and well-groomed.   HENT:      Head: Normocephalic.   Eyes:      General:         Right eye: No discharge.         Left eye: No discharge.   Cardiovascular:      Rate and Rhythm: Normal rate.   Pulmonary:      Effort: Pulmonary effort is normal. No respiratory distress.   Skin:     Coloration: Skin is not pale.      Comments: Purpura noted   Neurological:      Mental Status: She is alert and oriented to person, place, and time.      Coordination: Coordination normal.      Gait: Gait normal.   Psychiatric:         Attention and Perception: Attention normal.         Mood and Affect: Mood and affect normal.         Speech: Speech normal.         Behavior: Behavior normal. Behavior is cooperative.         Thought Content: Thought content normal.         Cognition and Memory: Cognition and memory normal.             Diagnoses and health risks identified " today and associated recommendations/orders:    1. Encounter for preventive health examination    2. Secondary esophageal varices without bleeding  Chronic; stable. Followed by Hepatology.    3. Sacroiliitis  Chronic; stable on gabapentin medication. Follow up with PCP.    4. Other cirrhosis of liver  Chronic; stable. Followed by Hepatology.    5. Type 2 diabetes mellitus with stage 3a chronic kidney disease, with long-term current use of insulin  Chronic; stable on metformin, glipizide, insulin medication. Follow up with PCP.    6. Hypertension associated with diabetes  Chronic; stable on ARB medication. Follow up with PCP.    7. Diabetic polyneuropathy associated with type 2 diabetes mellitus  Chronic; stable on metformin, glipizide and insulin medication. Followed by Podiatry.    8. Mild nonproliferative diabetic retinopathy of 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 edema associated with type 2 diabetes mellitus  Chronic; stable on metformin, glipizide, and insulin medication. Followed by Ophthalmology.    9. Bilateral carotid artery disease, unspecified type  Chronic; stable on statin medication. Follow up with PCP.    10. Abdominal aortic atherosclerosis  Chronic; stable on statin medication. Follow up with PCP.    11. Purpura  As seen on attached photo; follow up with PCP.    12. Hypothyroidism, unspecified type  Chronic; stable on levothyroxine medication. Follow up with PCP.    13. Morbid obesity with body mass index (BMI) of 40.0 to 44.9 in adult  Eat a low salt/low fat ADA diet and discussed importance of engaging in physical activity at least 5x/week for a minimum of 30 min/day.      Provided Vivienne with a 5-10 year written screening schedule and personal prevention plan. Recommendations were developed using the USPSTF age appropriate recommendations. Education, counseling, and referrals were provided as needed. After Visit Summary given to patient which includes a list of additional screenings/tests needed.    Follow  up for your next annual wellness visit.    Leta Adair, NP      I offered to discuss advanced care planning, including how to pick a person who would make decisions for you if you were unable to make them for yourself, called a health care power of , and what kind of decisions you might make such as use of life sustaining treatments such as ventilators and tube feeding when faced with a life limiting illness recorded on a living will that they will need to know. (How you want to be cared for as you near the end of your natural life)     X  Patient has advanced directives on file, which we reviewed, and they do not wish to make changes.

## 2024-04-28 NOTE — TELEPHONE ENCOUNTER
Notify pt. Allergy alert is coming up with omeprazole; please verify with pt. That she can take this med with issue; clarify that pt. Takes valcyclovir daily   Time-based billing (NON-critical care)

## 2024-04-30 ENCOUNTER — CLINICAL SUPPORT (OUTPATIENT)
Dept: ALLERGY | Facility: CLINIC | Age: 74
End: 2024-04-30
Payer: MEDICARE

## 2024-04-30 DIAGNOSIS — J30.9 CHRONIC ALLERGIC RHINITIS: Primary | ICD-10-CM

## 2024-04-30 PROCEDURE — 95115 IMMUNOTHERAPY ONE INJECTION: CPT | Mod: HCNC,S$GLB,, | Performed by: STUDENT IN AN ORGANIZED HEALTH CARE EDUCATION/TRAINING PROGRAM

## 2024-05-06 DIAGNOSIS — E11.69 DYSLIPIDEMIA ASSOCIATED WITH TYPE 2 DIABETES MELLITUS: ICD-10-CM

## 2024-05-06 DIAGNOSIS — E78.5 DYSLIPIDEMIA ASSOCIATED WITH TYPE 2 DIABETES MELLITUS: ICD-10-CM

## 2024-05-06 NOTE — TELEPHONE ENCOUNTER
Refill Routing Note   Medication(s) are not appropriate for processing by [facility] for the following reason(s):        Drug-drug interaction    [facility] action(s):  Defer        Medication Therapy Plan: Drug-Drug: lovastatin and ALIVE ONCE DAILY WOMEN 50 PLUS ORAL    Pharmacist review requested: Yes     Appointments  past 12m or future 3m with PCP    Date Provider   Last Visit   3/13/2024 Aislinn Magallon MD   Next Visit   Visit date not found Aislinn Magallon MD   ED visits in past 90 days: 0        Note composed:5:58 PM 05/06/2024

## 2024-05-06 NOTE — TELEPHONE ENCOUNTER
No care due was identified.  Health [facility] Embedded Care Due Messages. Reference number: [id number].   5/06/2024 12:00:45 PM CDT

## 2024-05-07 ENCOUNTER — LAB VISIT (OUTPATIENT)
Dept: LAB | Facility: HOSPITAL | Age: 74
End: 2024-05-07
Attending: INTERNAL MEDICINE
Payer: MEDICARE

## 2024-05-07 DIAGNOSIS — K74.69 OTHER CIRRHOSIS OF LIVER: ICD-10-CM

## 2024-05-07 LAB
ALBUMIN SERPL BCP-MCNC: 3.2 G/DL (ref 3.5–5.2)
ALP SERPL-CCNC: 92 U/L (ref 55–135)
ALT SERPL W/O P-5'-P-CCNC: 28 U/L (ref 10–44)
ANION GAP SERPL CALC-SCNC: 11 MMOL/L (ref 8–16)
AST SERPL-CCNC: 31 U/L (ref 10–40)
BILIRUB SERPL-MCNC: 0.4 MG/DL (ref 0.1–1)
BUN SERPL-MCNC: 15 MG/DL (ref 8–23)
CALCIUM SERPL-MCNC: 9.8 MG/DL (ref 8.7–10.5)
CHLORIDE SERPL-SCNC: 103 MMOL/L (ref 95–110)
CO2 SERPL-SCNC: 22 MMOL/L (ref 23–29)
CREAT SERPL-MCNC: 1 MG/DL (ref 0.5–1.4)
EST. GFR  (NO RACE VARIABLE): 59.9 ML/MIN/1.73 M^2
GLUCOSE SERPL-MCNC: 219 MG/DL (ref 70–110)
POTASSIUM SERPL-SCNC: 4.5 MMOL/L (ref 3.5–5.1)
PROT SERPL-MCNC: 7.4 G/DL (ref 6–8.4)
SODIUM SERPL-SCNC: 136 MMOL/L (ref 136–145)

## 2024-05-07 PROCEDURE — 80053 COMPREHEN METABOLIC PANEL: CPT | Mod: HCNC | Performed by: INTERNAL MEDICINE

## 2024-05-07 PROCEDURE — 36415 COLL VENOUS BLD VENIPUNCTURE: CPT | Performed by: INTERNAL MEDICINE

## 2024-05-07 RX ORDER — LOVASTATIN 40 MG/1
TABLET ORAL
Qty: 90 TABLET | Refills: 3 | Status: SHIPPED | OUTPATIENT
Start: 2024-05-07

## 2024-05-07 NOTE — TELEPHONE ENCOUNTER
Refill Decision Note   Vivienne Jsoe  is requesting a refill authorization.  Brief Assessment and Rationale for Refill:  Approve     Medication Therapy Plan:         Pharmacist review requested: Yes   Extended chart review required: Yes   Comments:     Note composed:1:58 AM 05/07/2024

## 2024-05-24 ENCOUNTER — TELEPHONE (OUTPATIENT)
Dept: ALLERGY | Facility: CLINIC | Age: 74
End: 2024-05-24
Payer: MEDICARE

## 2024-05-24 ENCOUNTER — OFFICE VISIT (OUTPATIENT)
Dept: OPTOMETRY | Facility: CLINIC | Age: 74
End: 2024-05-24
Payer: MEDICARE

## 2024-05-24 DIAGNOSIS — H52.4 MYOPIA WITH ASTIGMATISM AND PRESBYOPIA, BILATERAL: ICD-10-CM

## 2024-05-24 DIAGNOSIS — H52.13 MYOPIA WITH ASTIGMATISM AND PRESBYOPIA, BILATERAL: ICD-10-CM

## 2024-05-24 DIAGNOSIS — H52.203 MYOPIA WITH ASTIGMATISM AND PRESBYOPIA, BILATERAL: ICD-10-CM

## 2024-05-24 DIAGNOSIS — E11.9 TYPE 2 DIABETES MELLITUS WITHOUT RETINOPATHY: Primary | ICD-10-CM

## 2024-05-24 DIAGNOSIS — Z96.1 PSEUDOPHAKIA OF BOTH EYES: ICD-10-CM

## 2024-05-24 PROCEDURE — 4010F ACE/ARB THERAPY RXD/TAKEN: CPT | Mod: HCNC,CPTII,S$GLB, | Performed by: OPTOMETRIST

## 2024-05-24 PROCEDURE — 99999 PR PBB SHADOW E&M-EST. PATIENT-LVL IV: CPT | Mod: PBBFAC,HCNC,, | Performed by: OPTOMETRIST

## 2024-05-24 PROCEDURE — 1160F RVW MEDS BY RX/DR IN RCRD: CPT | Mod: HCNC,CPTII,S$GLB, | Performed by: OPTOMETRIST

## 2024-05-24 PROCEDURE — 3288F FALL RISK ASSESSMENT DOCD: CPT | Mod: HCNC,CPTII,S$GLB, | Performed by: OPTOMETRIST

## 2024-05-24 PROCEDURE — 3051F HG A1C>EQUAL 7.0%<8.0%: CPT | Mod: HCNC,CPTII,S$GLB, | Performed by: OPTOMETRIST

## 2024-05-24 PROCEDURE — 3066F NEPHROPATHY DOC TX: CPT | Mod: HCNC,CPTII,S$GLB, | Performed by: OPTOMETRIST

## 2024-05-24 PROCEDURE — 1159F MED LIST DOCD IN RCRD: CPT | Mod: HCNC,CPTII,S$GLB, | Performed by: OPTOMETRIST

## 2024-05-24 PROCEDURE — 1101F PT FALLS ASSESS-DOCD LE1/YR: CPT | Mod: HCNC,CPTII,S$GLB, | Performed by: OPTOMETRIST

## 2024-05-24 PROCEDURE — 92014 COMPRE OPH EXAM EST PT 1/>: CPT | Mod: HCNC,S$GLB,, | Performed by: OPTOMETRIST

## 2024-05-24 PROCEDURE — 1157F ADVNC CARE PLAN IN RCRD: CPT | Mod: HCNC,CPTII,S$GLB, | Performed by: OPTOMETRIST

## 2024-05-24 PROCEDURE — 3061F NEG MICROALBUMINURIA REV: CPT | Mod: HCNC,CPTII,S$GLB, | Performed by: OPTOMETRIST

## 2024-05-24 PROCEDURE — 1126F AMNT PAIN NOTED NONE PRSNT: CPT | Mod: HCNC,CPTII,S$GLB, | Performed by: OPTOMETRIST

## 2024-05-24 PROCEDURE — 2023F DILAT RTA XM W/O RTNOPTHY: CPT | Mod: HCNC,CPTII,S$GLB, | Performed by: OPTOMETRIST

## 2024-05-24 NOTE — PROGRESS NOTES
HPI    Pt here for 6 month f/u post cataract sx    Pt sts [facility] well since sx and updated specs. Pt has floaters OS, no   changes. Pt denies flashes/pain. Pt using Equ[employer] allergy gtt PRN, last   used this am.  this am.    Hemoglobin A1C       Date                     Value               Ref Range             Status                03/08/2024               7.2 (H)             4.0 - 5.6 %           Final                  08/17/2023               6.7 (H)             4.0 - 5.6 %           Final                 02/09/2023               6.3 (H)             4.0 - 5.6 %           Final            Last edited by Flora Machado on 5/24/2024  1:49 PM.            Assessment /Plan     For exam results, see Encounter Report.    Type 2 diabetes mellitus without retinopathy    Pseudophakia of both eyes    Myopia with astigmatism and presbyopia, bilateral      No diabetic retinopathy, no csme. Return in 1 year for dilated eye exam.  2. Monitor condition. Patient to report any changes. RTC 1 year recheck.  3. Copy of [facility] given, discussed different options for glasses. RTC 1 year routine eye exam.

## 2024-05-27 ENCOUNTER — CLINICAL SUPPORT (OUTPATIENT)
Dept: ALLERGY | Facility: CLINIC | Age: 74
End: 2024-05-27
Payer: MEDICARE

## 2024-05-27 DIAGNOSIS — J30.9 CHRONIC ALLERGIC RHINITIS: Primary | ICD-10-CM

## 2024-05-27 PROCEDURE — 95115 IMMUNOTHERAPY ONE INJECTION: CPT | Mod: HCNC,S$GLB,, | Performed by: STUDENT IN AN ORGANIZED HEALTH CARE EDUCATION/TRAINING PROGRAM

## 2024-06-03 ENCOUNTER — TELEPHONE (OUTPATIENT)
Dept: PRIMARY CARE CLINIC | Facility: CLINIC | Age: 74
End: 2024-06-03

## 2024-06-03 ENCOUNTER — LAB VISIT (OUTPATIENT)
Dept: LAB | Facility: HOSPITAL | Age: 74
End: 2024-06-03
Attending: INTERNAL MEDICINE
Payer: MEDICARE

## 2024-06-03 ENCOUNTER — OFFICE VISIT (OUTPATIENT)
Dept: PRIMARY CARE CLINIC | Facility: CLINIC | Age: 74
End: 2024-06-03
Payer: MEDICARE

## 2024-06-03 VITALS
WEIGHT: 235.88 LBS | DIASTOLIC BLOOD PRESSURE: 68 MMHG | OXYGEN SATURATION: 98 % | SYSTOLIC BLOOD PRESSURE: 102 MMHG | BODY MASS INDEX: 43.41 KG/M2 | HEIGHT: 62 IN | TEMPERATURE: 98 F | HEART RATE: 72 BPM

## 2024-06-03 DIAGNOSIS — M54.50 ACUTE BILATERAL LOW BACK PAIN WITHOUT SCIATICA: Primary | ICD-10-CM

## 2024-06-03 DIAGNOSIS — M54.50 ACUTE BILATERAL LOW BACK PAIN WITHOUT SCIATICA: ICD-10-CM

## 2024-06-03 LAB
BACTERIA #/AREA URNS AUTO: ABNORMAL /HPF
BILIRUB UR QL STRIP: NEGATIVE
CAOX CRY UR QL COMP ASSIST: ABNORMAL
CLARITY UR REFRACT.AUTO: ABNORMAL
COLOR UR AUTO: YELLOW
GLUCOSE UR QL STRIP: NEGATIVE
HGB UR QL STRIP: NEGATIVE
HYALINE CASTS UR QL AUTO: 15 /LPF
KETONES UR QL STRIP: ABNORMAL
LEUKOCYTE ESTERASE UR QL STRIP: ABNORMAL
MICROSCOPIC COMMENT: ABNORMAL
NITRITE UR QL STRIP: NEGATIVE
PH UR STRIP: 6 [PH] (ref 5–8)
PROT UR QL STRIP: ABNORMAL
RBC #/AREA URNS AUTO: 6 /HPF (ref 0–4)
SP GR UR STRIP: 1.02 (ref 1–1.03)
SQUAMOUS #/AREA URNS AUTO: 31 /HPF
URN SPEC COLLECT METH UR: ABNORMAL
WBC #/AREA URNS AUTO: >100 /HPF (ref 0–5)
WBC CLUMPS UR QL AUTO: ABNORMAL

## 2024-06-03 PROCEDURE — 1157F ADVNC CARE PLAN IN RCRD: CPT | Mod: HCNC,CPTII,S$GLB, | Performed by: INTERNAL MEDICINE

## 2024-06-03 PROCEDURE — 3288F FALL RISK ASSESSMENT DOCD: CPT | Mod: HCNC,CPTII,S$GLB, | Performed by: INTERNAL MEDICINE

## 2024-06-03 PROCEDURE — 4010F ACE/ARB THERAPY RXD/TAKEN: CPT | Mod: HCNC,CPTII,S$GLB, | Performed by: INTERNAL MEDICINE

## 2024-06-03 PROCEDURE — 3008F BODY MASS INDEX DOCD: CPT | Mod: HCNC,CPTII,S$GLB, | Performed by: INTERNAL MEDICINE

## 2024-06-03 PROCEDURE — 1159F MED LIST DOCD IN RCRD: CPT | Mod: HCNC,CPTII,S$GLB, | Performed by: INTERNAL MEDICINE

## 2024-06-03 PROCEDURE — 87186 SC STD MICRODIL/AGAR DIL: CPT | Mod: HCNC | Performed by: INTERNAL MEDICINE

## 2024-06-03 PROCEDURE — 1101F PT FALLS ASSESS-DOCD LE1/YR: CPT | Mod: HCNC,CPTII,S$GLB, | Performed by: INTERNAL MEDICINE

## 2024-06-03 PROCEDURE — 99215 OFFICE O/P EST HI 40 MIN: CPT | Mod: HCNC,S$GLB,, | Performed by: INTERNAL MEDICINE

## 2024-06-03 PROCEDURE — 87077 CULTURE AEROBIC IDENTIFY: CPT | Mod: HCNC | Performed by: INTERNAL MEDICINE

## 2024-06-03 PROCEDURE — 87086 URINE CULTURE/COLONY COUNT: CPT | Mod: HCNC | Performed by: INTERNAL MEDICINE

## 2024-06-03 PROCEDURE — 3074F SYST BP LT 130 MM HG: CPT | Mod: HCNC,CPTII,S$GLB, | Performed by: INTERNAL MEDICINE

## 2024-06-03 PROCEDURE — 3061F NEG MICROALBUMINURIA REV: CPT | Mod: HCNC,CPTII,S$GLB, | Performed by: INTERNAL MEDICINE

## 2024-06-03 PROCEDURE — 87088 URINE BACTERIA CULTURE: CPT | Mod: HCNC | Performed by: INTERNAL MEDICINE

## 2024-06-03 PROCEDURE — 99999 PR PBB SHADOW E&M-EST. PATIENT-LVL V: CPT | Mod: PBBFAC,HCNC,, | Performed by: INTERNAL MEDICINE

## 2024-06-03 PROCEDURE — 1125F AMNT PAIN NOTED PAIN PRSNT: CPT | Mod: HCNC,CPTII,S$GLB, | Performed by: INTERNAL MEDICINE

## 2024-06-03 PROCEDURE — 3066F NEPHROPATHY DOC TX: CPT | Mod: HCNC,CPTII,S$GLB, | Performed by: INTERNAL MEDICINE

## 2024-06-03 PROCEDURE — 3078F DIAST BP <80 MM HG: CPT | Mod: HCNC,CPTII,S$GLB, | Performed by: INTERNAL MEDICINE

## 2024-06-03 PROCEDURE — 81001 URINALYSIS AUTO W/SCOPE: CPT | Mod: HCNC | Performed by: INTERNAL MEDICINE

## 2024-06-03 PROCEDURE — 3051F HG A1C>EQUAL 7.0%<8.0%: CPT | Mod: HCNC,CPTII,S$GLB, | Performed by: INTERNAL MEDICINE

## 2024-06-03 NOTE — TELEPHONE ENCOUNTER
----- Message from Deloris Cisneros MD sent at 6/3/2024  3:42 PM CDT -----  Please schedule patient follow up appointment to address recent labs. Prior PCP has left [facility]. Patient needs to re-establish with available provider.   Dr. Peralta was listed as PCP but she has an establish care appt with Dr. Hall as well. Please schedule with whomever is available to establish care with patient sooner to adjust meds. Thank you!    Future Appointments  6/25/2024  8:45 AM    INJECTION, ALLERGY MAIN C# [facility] ALLIMM         [address]  7/9/2024   9:00 AM    [facility]  US1                  [facility] ULTRA          [place]  7/9/2024   9:40 AM    LAB, [facility]                  [facility] LABDRA         [place]  7/30/2024  2:00 PM    Jose Pickett, DPM     [facility] PODIA          [place]  9/10/2024  9:00 AM    Nalini Hall MD     [facility] 65PLUS         65[place]  4/24/2025  1:00 PM    Leta Adair NP    [facility]

## 2024-06-03 NOTE — ASSESSMENT & PLAN NOTE
Check urine for UTI , unlikely given her presentation  Unable to do urinalysis in the clinic today due to machine malfunction   Patient is being sent to the lab with a urine sample  We will follow up on results and treat UTI accordingly  If urine results are normal-the symptoms are most likely due to a mild viral illness or arthritis.  Advised to take a Tylenol and rest  Adequate hydration  If signs and symptoms worsen, please call the clinic immediately and make a follow up appointment

## 2024-06-03 NOTE — PROGRESS NOTES
Clinic Note  6/3/2024      Subjective:       Patient ID:  Vivienne is a 73 y.o. female being seen for an established visit.    Chief Complaint: Back Pain    Vivienne is a 73 y.o. female with  liver cirrhosis, hypertension, type 2 diabetes, coronary artery disease, hypothyroidism, sleep apnea, recurrent urinary tract infection and  afib is here as a same-day visit for possible UTI  Over the weekend, she had 1 day of mild runny nose followed by some low back pain.  She has recurrent UTIs and she is worried that her back pain might be related to another UTI.  She denies any dysuria, hematuria but noticed some odor change in the urine.  No fever noted    Back Pain  This is a new problem. The current episode started in the past 7 days. The problem occurs constantly. The problem has been gradually improving since onset. The pain is present in the lumbar spine. The quality of the pain is described as aching. The pain does not radiate. The pain is at a severity of 3/10. The pain is mild. The pain is The same all the time. Pertinent negatives include no chest pain, dysuria or fever. She has tried nothing for the symptoms.       Review of Systems   Constitutional:  Negative for chills and fever.   Eyes:  Negative for blurred vision.   Respiratory:  Negative for cough.    Cardiovascular:  Negative for chest pain and palpitations.   Gastrointestinal:  Negative for heartburn and nausea.   Genitourinary:  Negative for dysuria, flank pain, frequency, hematuria and urgency.   Musculoskeletal:  Positive for back pain. Negative for myalgias.   Skin:  Negative for rash.   Neurological:  Negative for dizziness.       Medication List with Changes/Refills   Current Medications    ASCORBIC ACID, VITAMIN C, (VITAMIN C) 100 MG TABLET    Take 500 mg by mouth 2 (two) times daily.     ASPIRIN (ECOTRIN) 81 MG EC TABLET    Take 81 mg by mouth once daily.    AZELASTINE (ASTELIN) 137 MCG (0.1 %) NASAL SPRAY    2 sprays (274 mcg total) by Nasal route 2  (two) times daily.    BETAMETHASONE DIPROPIONATE (DIPROLENE) 0.05 % OINTMENT    Apply topically 2 (two) times daily. Use to affected areas for up to 2 weeks then take a 1 week break or decrease to 3 times weekly. Do not apply to groin or face. Use to spot on ear when flaring    BLOOD SUGAR DIAGNOSTIC (TRUE METRIX GLUCOSE TEST STRIP) STRP    TEST TWO TIMES DAILY    BLOOD-GLUCOSE METER MISC    [employer] True Metrix Air meter    DULOXETINE (CYMBALTA) 20 MG CAPSULE    Take 1 capsule (20 mg total) by mouth once daily.    ESTRADIOL (ESTRING) 2 MG (7.5 MCG /24 HOUR) VAGINAL RING    Remove old Estring, place the new one every 3 months.    FLUTICASONE PROPIONATE (FLONASE) 50 MCG/ACTUATION NASAL SPRAY    2 sprays (100 mcg total) by Each Nostril route once daily.    FUROSEMIDE (LASIX) 20 MG TABLET    Take 1 tablet (20 mg total) by mouth every other day.    GABAPENTIN (NEURONTIN) 600 MG TABLET    Take 1 tablet (600 mg total) by mouth 3 (three) times daily.    GLIPIZIDE (GLUCOTROL) 5 MG TABLET    TAKE 1 TABLET TWICE DAILY BEFORE MEALS    INSULIN DEGLUDEC (TRESIBA FLEXTOUCH U-100) 100 UNIT/ML (3 ML) INSULIN PEN    Inject 32 Units into the skin every evening.    IPRATROPIUM (ATROVENT) 42 MCG (0.06 %) NASAL SPRAY    2 sprays by Each Nostril route 4 (four) times daily.    KETOTIFEN (ZADITOR) 0.025 % (0.035 %) OPHTHALMIC SOLUTION    Place 1-2 drops into both eyes once daily.    L.ACID-B.BIFIDUM-B.ANIMAL-FOS 25 BILLION CELL -100 MG CAP    Take 1 capsule by mouth once daily.    LANCETS (TRUEPLUS LANCETS) 28 GAUGE MISC    Inject 1 lancet into the skin 2 (two) times daily before meals.    LEVOTHYROXINE (SYNTHROID) 75 MCG TABLET    TAKE 1 TABLET EVERY DAY    LORATADINE (CLARITIN) 10 MG TABLET    Take 10 mg by mouth once daily.    LOVASTATIN (MEVACOR) 40 MG TABLET    TAKE 1 TABLET NIGHTLY (SUBSTITUTED FOR MEVACOR)    METFORMIN (GLUCOPHAGE) 1000 MG TABLET    TAKE 1 TABLET TWICE DAILY WITH MEALS    MV-MN/FOLIC ACID/VIT K/[id number] (ALIVE ONCE  "DAILY WOMEN 50 PLUS ORAL)    Take 1 tablet by mouth once daily.    OMEPRAZOLE (PRILOSEC) 20 MG CAPSULE    TAKE 1 CAPSULE BY MOUTH DAILY AS NEEDED (ACID REFLUX).    PEN NEEDLE, DIABETIC (BD ULTRA-FINE EDUARDO PEN NEEDLE) 32 GAUGE X 5/32" NDLE    USE AS DIRECTED    SEMAGLUTIDE (RYBELSUS) 14 MG TABLET    Take 1 tablet (14 mg total) by mouth once daily.    SPIRONOLACTONE (ALDACTONE) 50 MG TABLET    Take 1 tablet (50 mg total) by mouth every other day.    UNABLE TO FIND    Take 1 tablet by mouth 2 (two) times a day. medication name: Magnesium 400mg, Calcium 1000 mg, D3 15mcg, Zinc 15mg    VALACYCLOVIR (VALTREX) 500 MG TABLET    Take 1 tablet (500 mg total) by mouth once daily.    VALSARTAN (DIOVAN) 160 MG TABLET    Take 1 tablet (160 mg total) by mouth once daily.           Objective:      /68 (BP Location: Right arm, Patient Position: Sitting, BP Method: Large (Manual))   Pulse 72   Temp 98 °F (36.7 °C) (Oral)   Ht 5' 2" (1.575 m)   Wt 107 kg (235 lb 14.3 oz)   LMP  (LMP Unknown)   SpO2 98%   BMI 43.15 kg/m²   Estimated body mass index is 43.15 kg/m² as calculated from the following:    Height as of this encounter: 5' 2" (1.575 m).    Weight as of this encounter: 107 kg (235 lb 14.3 oz).  Physical Exam  Constitutional:       Appearance: Normal appearance. She is obese.   HENT:      Head: Normocephalic and atraumatic.      Nose: Nose normal.      Mouth/Throat:      Mouth: Mucous membranes are moist.   Eyes:      Pupils: Pupils are equal, round, and reactive to light.   Cardiovascular:      Rate and Rhythm: Normal rate and regular rhythm.      Pulses: Normal pulses.      Heart sounds: Normal heart sounds. No murmur heard.  Pulmonary:      Effort: Pulmonary effort is normal.      Breath sounds: Normal breath sounds.   Abdominal:      General: Bowel sounds are normal.      Palpations: Abdomen is soft.   Skin:     General: Skin is warm.   Neurological:      General: No focal deficit present.      Mental Status: She " is alert and oriented to person, place, and time.      Cranial Nerves: No cranial nerve deficit.   Psychiatric:         Mood and Affect: Mood normal.           Assessment and Plan:         Problem List Items Addressed This Visit          Orthopedic    Acute bilateral low back pain without sciatica - Primary    Current Assessment & Plan     Check urine for UTI , unlikely given her presentation  Unable to do urinalysis in the clinic today due to machine malfunction   Patient is being sent to the lab with a urine sample  We will follow up on results and treat UTI accordingly  If urine results are normal-the symptoms are most likely due to a mild viral illness or arthritis.  Advised to take a Tylenol and rest  Adequate hydration  If signs and symptoms worsen, please call the clinic immediately and make a follow up appointment         Relevant Orders    Urinalysis Microscopic    Urine culture    Urinalysis       Follow Up:   Follow up in about 3 months (around 9/3/2024).    Other Orders Placed This Visit:  Orders Placed This Encounter   Procedures    Urine culture    Urinalysis Microscopic    Urinalysis         Marimar Peralta

## 2024-06-04 ENCOUNTER — PATIENT MESSAGE (OUTPATIENT)
Dept: PRIMARY CARE CLINIC | Facility: CLINIC | Age: 74
End: 2024-06-04
Payer: MEDICARE

## 2024-06-04 RX ORDER — NITROFURANTOIN 25; 75 MG/1; MG/1
100 CAPSULE ORAL 2 TIMES DAILY
Qty: 14 CAPSULE | Refills: 0 | Status: SHIPPED | OUTPATIENT
Start: 2024-06-04

## 2024-06-05 LAB — BACTERIA UR CULT: ABNORMAL

## 2024-06-10 ENCOUNTER — PATIENT MESSAGE (OUTPATIENT)
Dept: FAMILY MEDICINE | Facility: CLINIC | Age: 74
End: 2024-06-10
Payer: MEDICARE

## 2024-06-10 DIAGNOSIS — E03.9 HYPOTHYROIDISM, UNSPECIFIED TYPE: Primary | ICD-10-CM

## 2024-06-10 DIAGNOSIS — E03.9 HYPOTHYROIDISM, UNSPECIFIED TYPE: ICD-10-CM

## 2024-06-10 RX ORDER — LEVOTHYROXINE SODIUM 75 UG/1
TABLET ORAL
Qty: 30 TABLET | Refills: 0 | Status: SHIPPED | OUTPATIENT
Start: 2024-06-10

## 2024-06-21 ENCOUNTER — LAB VISIT (OUTPATIENT)
Dept: LAB | Facility: HOSPITAL | Age: 74
End: 2024-06-21
Payer: MEDICARE

## 2024-06-21 ENCOUNTER — OFFICE VISIT (OUTPATIENT)
Dept: INTERNAL MEDICINE | Facility: CLINIC | Age: 74
End: 2024-06-21
Payer: MEDICARE

## 2024-06-21 VITALS
DIASTOLIC BLOOD PRESSURE: 66 MMHG | SYSTOLIC BLOOD PRESSURE: 122 MMHG | WEIGHT: 238.13 LBS | HEART RATE: 82 BPM | OXYGEN SATURATION: 98 % | TEMPERATURE: 98 F | BODY MASS INDEX: 43.55 KG/M2

## 2024-06-21 DIAGNOSIS — J01.10 ACUTE NON-RECURRENT FRONTAL SINUSITIS: Primary | ICD-10-CM

## 2024-06-21 DIAGNOSIS — N39.0 CHRONIC UTI: ICD-10-CM

## 2024-06-21 DIAGNOSIS — R30.0 DYSURIA: ICD-10-CM

## 2024-06-21 DIAGNOSIS — Z29.9 PREVENTIVE MEASURE: ICD-10-CM

## 2024-06-21 LAB
BACTERIA #/AREA URNS AUTO: ABNORMAL /HPF
BILIRUB UR QL STRIP: NEGATIVE
CLARITY UR REFRACT.AUTO: ABNORMAL
COLOR UR AUTO: YELLOW
GLUCOSE UR QL STRIP: NEGATIVE
HGB UR QL STRIP: NEGATIVE
KETONES UR QL STRIP: NEGATIVE
LEUKOCYTE ESTERASE UR QL STRIP: ABNORMAL
MICROSCOPIC COMMENT: ABNORMAL
NITRITE UR QL STRIP: NEGATIVE
PH UR STRIP: 7 [PH] (ref 5–8)
PROT UR QL STRIP: NEGATIVE
RBC #/AREA URNS AUTO: 9 /HPF (ref 0–4)
SP GR UR STRIP: 1.02 (ref 1–1.03)
SQUAMOUS #/AREA URNS AUTO: 12 /HPF
URN SPEC COLLECT METH UR: ABNORMAL
WBC #/AREA URNS AUTO: >100 /HPF (ref 0–5)
WBC CLUMPS UR QL AUTO: ABNORMAL

## 2024-06-21 PROCEDURE — 87186 SC STD MICRODIL/AGAR DIL: CPT | Mod: HCNC | Performed by: NURSE PRACTITIONER

## 2024-06-21 PROCEDURE — 87077 CULTURE AEROBIC IDENTIFY: CPT | Mod: HCNC | Performed by: NURSE PRACTITIONER

## 2024-06-21 PROCEDURE — 81001 URINALYSIS AUTO W/SCOPE: CPT | Mod: HCNC | Performed by: NURSE PRACTITIONER

## 2024-06-21 PROCEDURE — 99999 PR PBB SHADOW E&M-EST. PATIENT-LVL V: CPT | Mod: PBBFAC,HCNC,, | Performed by: NURSE PRACTITIONER

## 2024-06-21 PROCEDURE — 87086 URINE CULTURE/COLONY COUNT: CPT | Mod: HCNC | Performed by: NURSE PRACTITIONER

## 2024-06-21 PROCEDURE — 87088 URINE BACTERIA CULTURE: CPT | Mod: HCNC | Performed by: NURSE PRACTITIONER

## 2024-06-21 RX ORDER — FLUCONAZOLE 150 MG/1
150 TABLET ORAL DAILY
Qty: 1 TABLET | Refills: 0 | Status: SHIPPED | OUTPATIENT
Start: 2024-06-21 | End: 2024-06-22

## 2024-06-21 RX ORDER — AMOXICILLIN AND CLAVULANATE POTASSIUM 562.5; 437.5; 62.5 MG/1; MG/1; MG/1
2 TABLET, FILM COATED, EXTENDED RELEASE ORAL 2 TIMES DAILY
Qty: 28 TABLET | Refills: 0 | Status: SHIPPED | OUTPATIENT
Start: 2024-06-21 | End: 2024-06-28

## 2024-06-21 NOTE — ASSESSMENT & PLAN NOTE
Urine will be collected today for culture.   Augmentin was appropriate with most recent infection and is being used for her current sinus infection.   Will provider abx based on UC and susceptibilities when resulted.

## 2024-06-21 NOTE — ASSESSMENT & PLAN NOTE
Patient likely has acute bacterial sinusitis.    Will treat as below.  No signs of preseptal orbital cellulitis, meningismus or neurologic changes concerning for intracranial processes.    Instructed patient to monitor closely and call office for any of these symptoms.  Supportive care reviewed colon raising head of bed, humidifier use, saline nasal spray, rest, encourage p.o. fluids and monitor hydration status and infection control measures.  Continue with current regimen which includes Flonase, Claritin and azelastine nasal spray.     Follow-up recommended especially if symptoms do not improve after completion antibiotics and conservative modalities.

## 2024-06-21 NOTE — PROGRESS NOTES
Subjective     Patient ID: Vivienne Jose is a 73 y.o. female.    Chief Complaint: Headache, Sinus Problem, Sore Throat, and Urinary Tract Infection    Pt in office for evaluation and management of possible UTI and sinus infection.     Pt has a hx of chronic UTIs. Most recent was treated with Macrobid on June 4th due to E coli infection. She took 7 day course as recommended but has now started having some dysuria and mild pressure with urination. This is how most of her other infections start. The frequency of her infections declined with use of the the e-string but she has not been able to get it refiled until recently. She will be picking up script today. She follows urogyn for her condition.     Hx of chronic allergies with month shots. Uses Flonase, Astelin and oral antihistamine daily. She reports significant maxillary sinus pain bilaterally which is worse on the R side in addition to some R otalgia. She also reports HA and ' teeth pain.'  She denies any fever, body aches or chills. Pt gets monthly allergy shots. She denies any other acute concerns during visit.     Headache   Associated symptoms include ear pain (Right), rhinorrhea, sinus pressure and a sore throat.   Sinus Problem  Associated symptoms include congestion, ear pain (Right), headaches, sinus pressure and a sore throat.   Sore Throat   Associated symptoms include congestion, ear pain (Right) and headaches.   Urinary Tract Infection       Review of Systems   HENT:  Positive for nasal congestion, ear pain (Right), rhinorrhea, sinus pressure/congestion and sore throat.    Neurological:  Positive for headaches.   All other systems reviewed and are negative.     Objective   Physical Exam  Vitals reviewed.   Constitutional:       Appearance: Normal appearance. She is obese.   HENT:      Head: Normocephalic and atraumatic.      Right Ear: Tympanic membrane normal. Tenderness present.      Left Ear: Tympanic membrane normal.   Eyes:       Conjunctiva/sclera: Conjunctivae normal.      Pupils: Pupils are equal, round, and reactive to light.   Cardiovascular:      Rate and Rhythm: Normal rate and regular rhythm.   Pulmonary:      Effort: Pulmonary effort is normal.      Breath sounds: Normal breath sounds.   Abdominal:      General: Bowel sounds are normal.      Palpations: Abdomen is soft.   Musculoskeletal:         General: Normal range of motion.      Cervical back: Normal range of motion and neck supple.   Skin:     General: Skin is warm.   Neurological:      General: No focal deficit present.   Psychiatric:         Mood and Affect: Mood normal.        Assessment and Plan   1. Acute non-recurrent frontal sinusitis  Assessment & Plan:  Patient likely has acute bacterial sinusitis.    Will treat as below.  No signs of preseptal orbital cellulitis, meningismus or neurologic changes concerning for intracranial processes.    Instructed patient to monitor closely and call office for any of these symptoms.  Supportive care reviewed colon raising head of bed, humidifier use, saline nasal spray, rest, encourage p.o. fluids and monitor hydration status and infection control measures.  Continue with current regimen which includes Flonase, Claritin and azelastine nasal spray.     Follow-up recommended especially if symptoms do not improve after completion antibiotics and conservative modalities.     Orders:  -     amoxicillin-clavulanate 1,000-62.5 mg (AUGMENTIN XR) 1,000-62.5 mg per tablet; Take 2 tablets by mouth 2 (two) times daily. for 7 days  Dispense: 28 tablet; Refill: 0  -     fluconazole (DIFLUCAN) 150 MG Tab; Take 1 tablet (150 mg total) by mouth once daily. for 1 day  Dispense: 1 tablet; Refill: 0    2. Preventive measure  Assessment & Plan:  Order for fluconazole provided if she develops and s/s of a yeast infection which were dicussed.      Orders:  -     fluconazole (DIFLUCAN) 150 MG Tab; Take 1 tablet (150 mg total) by mouth once daily. for 1 day   Dispense: 1 tablet; Refill: 0    3. Dysuria  Overview:  start macrobid and get U/A and Cx    Assessment & Plan:  We have some coverage with Augmentin while we wait for UC and sensitivities.     Orders:  -     Urinalysis; Future; Expected date: 06/21/2024  -     Urine culture; Future; Expected date: 06/21/2024    4. Chronic UTI  Assessment & Plan:  Urine will be collected today for culture.   Augmentin was appropriate with most recent infection and is being used for her current sinus infection.   Will provider abx based on UC and susceptibilities when resulted.       RTC PRN.

## 2024-06-21 NOTE — ASSESSMENT & PLAN NOTE
Order for fluconazole provided if she develops and s/s of a yeast infection which were dicussed.

## 2024-06-24 ENCOUNTER — PATIENT MESSAGE (OUTPATIENT)
Dept: ALLERGY | Facility: CLINIC | Age: 74
End: 2024-06-24
Payer: MEDICARE

## 2024-06-24 LAB — BACTERIA UR CULT: ABNORMAL

## 2024-06-28 ENCOUNTER — CLINICAL SUPPORT (OUTPATIENT)
Dept: ALLERGY | Facility: CLINIC | Age: 74
End: 2024-06-28
Payer: MEDICARE

## 2024-06-28 DIAGNOSIS — J30.9 CHRONIC ALLERGIC RHINITIS: Primary | ICD-10-CM

## 2024-06-28 PROCEDURE — 95115 IMMUNOTHERAPY ONE INJECTION: CPT | Mod: HCNC,S$GLB,, | Performed by: STUDENT IN AN ORGANIZED HEALTH CARE EDUCATION/TRAINING PROGRAM

## 2024-07-08 ENCOUNTER — PATIENT MESSAGE (OUTPATIENT)
Dept: OTHER | Facility: OTHER | Age: 74
End: 2024-07-08
Payer: MEDICARE

## 2024-07-09 ENCOUNTER — HOSPITAL ENCOUNTER (OUTPATIENT)
Dept: RADIOLOGY | Facility: HOSPITAL | Age: 74
Discharge: HOME | End: 2024-07-09
Attending: INTERNAL MEDICINE
Payer: MEDICARE

## 2024-07-09 DIAGNOSIS — K74.69 OTHER CIRRHOSIS OF LIVER: ICD-10-CM

## 2024-07-09 PROCEDURE — 76700 US EXAM ABDOM COMPLETE: CPT | Mod: TC

## 2024-07-09 PROCEDURE — 76700 US EXAM ABDOM COMPLETE: CPT | Mod: 26,,, | Performed by: STUDENT IN AN ORGANIZED HEALTH CARE EDUCATION/TRAINING PROGRAM

## 2024-07-12 ENCOUNTER — OFFICE VISIT (OUTPATIENT)
Dept: PRIMARY CARE CLINIC | Facility: CLINIC | Age: 74
End: 2024-07-12
Payer: MEDICARE

## 2024-07-12 VITALS
BODY MASS INDEX: 43.98 KG/M2 | RESPIRATION RATE: 16 BRPM | DIASTOLIC BLOOD PRESSURE: 60 MMHG | WEIGHT: 239 LBS | HEIGHT: 62 IN | OXYGEN SATURATION: 95 % | SYSTOLIC BLOOD PRESSURE: 110 MMHG | HEART RATE: 64 BPM

## 2024-07-12 DIAGNOSIS — B00.9 RECURRENT HSV (HERPES SIMPLEX VIRUS): ICD-10-CM

## 2024-07-12 DIAGNOSIS — N20.0 NEPHROLITHIASIS: ICD-10-CM

## 2024-07-12 DIAGNOSIS — E11.42 TYPE 2 DIABETES MELLITUS WITH PERIPHERAL NEUROPATHY: ICD-10-CM

## 2024-07-12 DIAGNOSIS — N18.32 CKD STAGE 3B, GFR 30-44 ML/MIN: ICD-10-CM

## 2024-07-12 DIAGNOSIS — I70.0 ABDOMINAL AORTIC ATHEROSCLEROSIS: ICD-10-CM

## 2024-07-12 DIAGNOSIS — K75.81 NASH (NONALCOHOLIC STEATOHEPATITIS): ICD-10-CM

## 2024-07-12 DIAGNOSIS — E89.0 POSTOPERATIVE HYPOTHYROIDISM: ICD-10-CM

## 2024-07-12 DIAGNOSIS — Z79.4 TYPE 2 DIABETES MELLITUS WITH STAGE 3A CHRONIC KIDNEY DISEASE, WITH LONG-TERM CURRENT USE OF INSULIN: ICD-10-CM

## 2024-07-12 DIAGNOSIS — E11.22 TYPE 2 DIABETES MELLITUS WITH STAGE 3A CHRONIC KIDNEY DISEASE, WITH LONG-TERM CURRENT USE OF INSULIN: ICD-10-CM

## 2024-07-12 DIAGNOSIS — Z76.89 ENCOUNTER TO ESTABLISH CARE WITH NEW DOCTOR: Primary | ICD-10-CM

## 2024-07-12 DIAGNOSIS — E11.42 DIABETIC POLYNEUROPATHY ASSOCIATED WITH TYPE 2 DIABETES MELLITUS: ICD-10-CM

## 2024-07-12 DIAGNOSIS — N18.31 TYPE 2 DIABETES MELLITUS WITH STAGE 3A CHRONIC KIDNEY DISEASE, WITH LONG-TERM CURRENT USE OF INSULIN: ICD-10-CM

## 2024-07-12 DIAGNOSIS — G47.33 OSA (OBSTRUCTIVE SLEEP APNEA): ICD-10-CM

## 2024-07-12 DIAGNOSIS — I10 ESSENTIAL HYPERTENSION: ICD-10-CM

## 2024-07-12 PROCEDURE — 99999 PR PBB SHADOW E&M-EST. PATIENT-LVL V: CPT | Mod: PBBFAC,HCNC,, | Performed by: STUDENT IN AN ORGANIZED HEALTH CARE EDUCATION/TRAINING PROGRAM

## 2024-07-12 RX ORDER — GABAPENTIN 600 MG/1
600 TABLET ORAL 2 TIMES DAILY
Start: 2024-07-12

## 2024-07-12 NOTE — ASSESSMENT & PLAN NOTE
Patient returning a missed call from a triage nurse.   Recent gfr of 43. Pt is on aldactone and valsartan. Per patient, they wanted to keep her off of jardiance but she does not know why. I suspect she does not drink enough water and takes lasix 20 mg every other day. Asked pt to cut back on salt. Will monitor and asked her to follow with renal   Statement Selected

## 2024-07-12 NOTE — ASSESSMENT & PLAN NOTE
Last A1c of 7.5. on metformin 1000 mg bid, rybelsus 14 mg daily, glipizide 5 mg bid. Continue, will monitor

## 2024-07-12 NOTE — PROGRESS NOTES
INTERNAL MEDICINE INITIAL VISIT NOTE      CHIEF COMPLAINT     Chief Complaint   Patient presents with    Establish Care       HPI     Vivienne Weaver is a 74 y.o.  female who presents with HTN, type 2 DM, HLD, DEL RIO, hypothyroidism (s/p thyroidectomy due to cysts), nephrolithiasis, recurrent UTIs (sees dr. Cee), sleep apnea, neuropathy is here to establish care with me.   -having some neck pain. Will attach neck exercises  -renal function declined over the last 4 months. Has not seen renal in a while   -fell out of bed on July 1st.     Advance Care Planning     Date: 07/12/2024    Power of   I initiated the process of voluntary advance care planning today and explained the importance of this process to the patient.  I introduced the concept of advance directives to the patient, as well. Then the patient received detailed information about the importance of designating a Health Care Power of  (HCPOA). She was also instructed to communicate with this person about their wishes for future healthcare, should she become sick and lose decision-making capacity. The patient has previously appointed a HCPOA. After our discussion, the patient has decided to complete a HCPOA and has appointed her significant other, health care agent: Tucker weaver & health care agent number: [phone]. I encouraged her to communicate with this person about their wishes for future healthcare, should she become sick and lose decision-making capacity.      A total of 5 min was spent on advance care planning, goals of care discussion, emotional support, formulating and communicating prognosis and exploring burden/benefit of various approaches of treatment. This discussion occurred on a fully voluntary basis with the verbal consent of the patient and/or family.  -has acp docs in chart. She is a DNR kind of person.       Past Medical History:  Past Medical History:   Diagnosis Date    Allergy     Angio-edema     Arthritis      Cataract     bilateral - not removed    Cerebrovascular malformation     Cirrhosis 11/24/2020    Colon polyps     Diabetes mellitus, type 2     Diabetic peripheral neuropathy     Edema of both feet 8/19/2022    GERD (gastroesophageal reflux disease)     Herpes infection     Hypothyroidism     Kidney stones     Mild nonproliferative diabetic retinopathy of 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 edema associated with type 2 diabetes mellitus 4/18/2019    DEL RIO (nonalcoholic steatohepatitis) 8/19/2022    Nausea & vomiting 11/23/2015    Obesity, morbid     Ovarian cyst     Seizure disorder, focal motor     Sleep apnea     Type II or unspecified type diabetes mellitus with neurological manifestations, uncontrolled(250.62)     Urticaria        Past Surgical History:  Past Surgical History:   Procedure Laterality Date    CARPAL TUNNEL RELEASE Right 2014    CATARACT EXTRACTION W/  INTRAOCULAR LENS IMPLANT Right 9/19/2023    Procedure: EXTRACTION, CATARACT, WITH IOL INSERTION;  Surgeon: Lyndon Ortiz MD;  Location: [facility] OR;  Service: Ophthalmology;  Laterality: Right;    CATARACT EXTRACTION W/  INTRAOCULAR LENS IMPLANT Left 10/3/2023    Procedure: EXTRACTION, CATARACT, WITH IOL INSERTION;  Surgeon: Lyndon Ortiz MD;  Location: [facility] OR;  Service: Ophthalmology;  Laterality: Left;    COLONOSCOPY      COLONOSCOPY N/A 9/13/2017    Procedure: COLONOSCOPY Golytely;  Surgeon: Gisela Wall MD;  Location: [facility];  Service: Endoscopy;  Laterality: N/A;    COLONOSCOPY N/A 9/9/2022    Procedure: COLONOSCOPY Extended Suprep;  Surgeon: Britt Jasso MD;  Location: [facility];  Service: Endoscopy;  Laterality: N/A;    CYST REMOVAL      skin; multiples    ESOPHAGOGASTRODUODENOSCOPY Left 10/15/2021    Procedure: EGD (ESOPHAGOGASTRODUODENOSCOPY);  Surgeon: Luis Fernando Taveras MD;  Location: [address]);  Service: Endoscopy;  Laterality: Left;  cirrhosis labwork am of procedure  last seizure 1973  COVID test on  10/12/21 at [facility]    ESOPHAGOGASTRODUODENOSCOPY N/A 3/10/2023    Procedure: EGD (ESOPHAGOGASTRODUODENOSCOPY);  Surgeon: Christa Caldera MD;  Location: [facility] ([address] [place] [address]);  Service: Endoscopy;  Laterality: N/A;  Medically Urgent  cirrohsis-labs done on 2/9/23 (< 90 days)  New onset A-fib  3/1 instructions to portal-st  Precall complete- [place]    EXTRACORPOREAL SHOCK WAVE LITHOTRIPSY  2002    HYSTERECTOMY  1984    JOINT REPLACEMENT Right 03/06/2019    knee    KIDNEY STONE SURGERY      KNEE ARTHROPLASTY Right 3/6/2019    Procedure: ARTHROPLASTY, KNEE;  Surgeon: Pj Gamboa MD;  Location: [facility];  Service: Orthopedics;  Laterality: Right;  Depuy (Stephen notified 2/21, CC)    THYROIDECTOMY  1977         TONSILLECTOMY, ADENOIDECTOMY      TRIGGER FINGER RELEASE      TUBAL LIGATION         Allergies:  Review of patient's allergies indicates:   Allergen Reactions    Sulfa (sulfonamide antibiotics) Nausea Only    Ciprofloxacin     Januvia [sitagliptin]      abd pain    Lisinopril     Lotensin [benazepril]     Nexium [esomeprazole magnesium] Other (See Comments)     Gas       Home Medications:  Prior to Admission medications    Medication Sig Start Date End Date Taking? Authorizing Provider   amoxicillin-clavulanate 1,000-62.5 mg (AUGMENTIN XR) 1,000-62.5 mg per tablet Take 2 tablets by mouth once daily for 7 days 6/21/24  Yes Carolyn Rowland NP   ascorbic acid, vitamin C, (VITAMIN C) 100 MG tablet Take 500 mg by mouth 2 (two) times daily.    Yes Provider, Historical   aspirin (ECOTRIN) 81 MG EC tablet Take 81 mg by mouth once daily.   Yes Provider, Historical   betamethasone dipropionate (DIPROLENE) 0.05 % ointment Apply topically 2 (two) times daily. Use to affected areas for up to 2 weeks then take a 1 week break or decrease to 3 times weekly. Do not apply to groin or face. Use to spot on ear when flaring 1/22/24  Yes Myron Anglin MD   blood sugar diagnostic (TRUE METRIX GLUCOSE TEST STRIP) Strp TEST TWO  TIMES DAILY 12/27/17  Yes Charity Robledo NP   blood-glucose meter Misc [employer] True Metrix Air meter 5/25/16  Yes Faizan Bullard MD   DULoxetine (CYMBALTA) 20 MG capsule Take 1 capsule (20 mg total) by mouth once daily. 2/9/24 2/8/25 Yes Jose Pickett DPM   fluticasone propionate (FLONASE) 50 mcg/actuation nasal spray 2 sprays (100 mcg total) by Each Nostril route once daily. 1/5/24  Yes Ailin Mills MD   furosemide (LASIX) 20 MG tablet Take 1 tablet (20 mg total) by mouth every other day. 3/11/24 3/11/25 Yes Nell Christianson MD   gabapentin (NEURONTIN) 600 MG tablet Take 1 tablet (600 mg total) by mouth 3 (three) times daily. 1/9/24  Yes Jose Pickett DPM   glipiZIDE (GLUCOTROL) 5 MG tablet TAKE 1 TABLET TWICE DAILY BEFORE MEALS 4/3/24  Yes Aislinn Magallon MD   insulin degludec (TRESIBA FLEXTOUCH U-100) 100 unit/mL (3 mL) insulin pen Inject 32 Units into the skin every evening. 5/23/24 8/21/24 Yes Aislinn Magallon MD   ipratropium (ATROVENT) 42 mcg (0.06 %) nasal spray 2 sprays by Each Nostril route 4 (four) times daily. 8/1/23  Yes Ailin Mills MD   ketotifen (ZADITOR) 0.025 % (0.035 %) ophthalmic solution Place 1-2 drops into both eyes once daily.   Yes Provider, Historical   L.acid-B.bifidum-B.animal-FOS 25 billion cell -100 mg Cap Take 1 capsule by mouth once daily. 5/8/17  Yes Provider, Historical   lancets (TRUEPLUS LANCETS) 28 gauge [facility] Inject 1 lancet into the skin 2 (two) times daily before meals. 12/27/17  Yes Charity Robledo, SLY   levothyroxine (SYNTHROID) 75 MCG tablet TAKE 1 TABLET EVERY DAY 6/10/24  Yes Olaf, Mikal J, DO   loratadine (CLARITIN) 10 mg tablet Take 10 mg by mouth once daily.   Yes Provider, Historical   lovastatin (MEVACOR) 40 MG tablet TAKE 1 TABLET NIGHTLY (SUBSTITUTED FOR MEVACOR) 5/7/24  Yes Aislinn Mgaallon MD   metFORMIN (GLUCOPHAGE) 1000 MG tablet TAKE 1 TABLET TWICE DAILY WITH MEALS 4/3/24  Yes Aislinn Magallon MD   mv-mn/folic  "acid/vit K/oomh471 (ALIVE ONCE DAILY WOMEN 50 PLUS ORAL) Take 1 tablet by mouth once daily.   Yes Provider, Historical   nitrofurantoin, macrocrystal-monohydrate, (MACROBID) 100 MG capsule Take 1 capsule (100 mg total) by mouth 2 (two) times daily. 6/4/24  Yes Marimar Peralta MD   omeprazole (PRILOSEC) 20 MG capsule TAKE 1 CAPSULE BY MOUTH DAILY AS NEEDED (ACID REFLUX). 3/22/24  Yes Aislinn Magallon MD   pen needle, diabetic (BD ULTRA-FINE EDUARDO PEN NEEDLE) 32 gauge x 5/32" Ndle USE AS DIRECTED 1/12/23  Yes Aislinn Magallon MD   semaglutide (RYBELSUS) 14 mg tablet Take 1 tablet (14 mg total) by mouth once daily. 4/9/24  Yes Aislinn Magallon MD   spironolactone (ALDACTONE) 50 MG tablet Take 1 tablet (50 mg total) by mouth every other day. 3/11/24 3/11/25 Yes Nell Christianson MD   UNABLE TO FIND Take 1 tablet by mouth 2 (two) times a day. medication name: Magnesium 400mg, Calcium 1000 mg, D3 15mcg, Zinc 15mg   Yes Provider, Historical   valACYclovir (VALTREX) 500 MG tablet Take 1 tablet (500 mg total) by mouth once daily.  Patient taking differently: Take 500 mg by mouth as needed. 10/31/23  Yes Aislinn Magallon MD   valsartan (DIOVAN) 160 MG tablet Take 1 tablet (160 mg total) by mouth once daily. 5/9/24  Yes Aislinn Magallon MD   azelastine (ASTELIN) 137 mcg (0.1 %) nasal spray 2 sprays (274 mcg total) by Nasal route 2 (two) times daily. 6/14/22 6/3/24  MONIKA Weber III, MD   estradioL (ESTRING) 2 mg (7.5 mcg /24 hour) vaginal ring Remove old Estring, place the new one every 3 months.  Patient not taking: Reported on 6/3/2024 12/13/23   Patricia Cee MD       Family History:  Family History   Problem Relation Name Age of Onset    Skin cancer Mother      Macular degeneration Mother      Dementia Mother      Hypertension Mother      Cancer Father      Arthritis Father      Gout Father      No Known Problems Daughter Madisyn     Diabetes Daughter Patricia     Hypertension Daughter Patricia  " "   Arthritis Daughter Arianna     Allergies Son Lyndon     Allergic rhinitis Son Lyndon     Glaucoma Maternal Aunt          Great Maternal Aunt    Leukemia Maternal Uncle      Amblyopia Neg Hx      Cataracts Neg Hx      Retinal detachment Neg Hx      Strabismus Neg Hx      Stroke Neg Hx      Thyroid disease Neg Hx      Kidney disease Neg Hx      Angioedema Neg Hx      Asthma Neg Hx      Atopy Neg Hx      Eczema Neg Hx      Immunodeficiency Neg Hx      Rhinitis Neg Hx      Urticaria Neg Hx         Social History:  Social History     Tobacco Use    Smoking status: Never    Smokeless tobacco: Never   Substance Use Topics    Alcohol use: No     Alcohol/week: 0.0 standard drinks of alcohol    Drug use: No       Review of Systems:  Review of Systems   Constitutional:  Negative for chills and fever.   Respiratory:  Negative for cough, chest tightness and shortness of breath.    Cardiovascular:  Negative for chest pain.   Gastrointestinal:  Negative for abdominal pain, blood in stool, constipation and diarrhea.   Genitourinary:  Negative for dysuria and hematuria.   Neurological:  Negative for dizziness, light-headedness and headaches.       Health Maintainence:   Tdap 6/2014  Flu 10/2023  Prevnar 1/2018  Pneumovax 5/2020  Zoster got both  MMG 10/2023  C-SCOPE 9/2022  DEXA 6/2023  Hep C screening 11/2020  Low Dose CT scan in pts if 55-79 y/o with 30 pack yr smoking hx and currently smoke or have quit within past 15 yrs. Never smoked     PHYSICAL EXAM     /60 (BP Location: Right arm, Patient Position: Sitting, BP Method: Medium (Manual))   Pulse 64   Resp 16   Ht 5' 2" (1.575 m)   Wt 108.4 kg (238 lb 15.7 oz)   LMP  (LMP Unknown)   SpO2 95%   BMI 43.71 kg/m²     GEN - A+OX4, NAD   HEENT - PERRL  Neck - No cervical LAD.   CV - RRR, no m/r   Chest - CTAB, no wheezing or rhonchi  Abd - S/NT/ND/+BS.   Ext - 2+BDP and radial pulses. No LE edema.   MSK - Normal gait.   Skin - No rash.    LABS     Previous labs " reviewed from 6/2024. Ckd stage 3b noted. Lfts, hb, wbc, tsh wnl. Last A1c of 7.5.    ASSESSMENT/PLAN     1. Encounter to establish care with new doctor  Assessment & Plan:  Reviewed PMHx, labs, medications, social hx, vaccinations and screenings.      2. Type 2 diabetes mellitus with stage 3a chronic kidney disease, with long-term current use of insulin  Assessment & Plan:  Last A1c of 7.5. on metformin 1000 mg bid, rybelsus 14 mg daily, glipizide 5 mg bid. Continue, will monitor       3. CKD stage 3b, GFR 30-44 ml/min  Assessment & Plan:  Recent gfr of 43. Pt is on aldactone and valsartan. Per patient, they wanted to keep her off of jardiance but she does not know why. I suspect she does not drink enough water and takes lasix 20 mg every other day. Asked pt to cut back on salt. Will monitor and asked her to follow with renal      4. BAM (obstructive sleep apnea)  Assessment & Plan:  Compliant with CPAP. Continue       5. DEL RIO (nonalcoholic steatohepatitis)  Assessment & Plan:  Follows with hepatology. Lfts have been wnl. Continue to follow      6. Postoperative hypothyroidism  Assessment & Plan:  Euthyroid. Continue levothyroxine 75 mcg daily. Will monitor       7. Recurrent HSV (herpes simplex virus)  Assessment & Plan:  Gets this on her back if she is not taking valtrex, taking valtrex 500 mg daily. Continue      8. Nephrolithiasis  Assessment & Plan:  Follows with urology. Asked pt to drink water instead of sodas.       9. Abdominal aortic atherosclerosis  Overview:  As seen on xray imaging on 12/28/2022    Assessment & Plan:  Noted on previous imaging. On statin and asa, continue      10. Essential hypertension  Assessment & Plan:  Well controlled on valsartan and aldactone, will monitor. continue      11. Diabetic polyneuropathy associated with type 2 diabetes mellitus  Assessment & Plan:  On gabapentin and duloxetine to help with neuropathy. Doing well. continue      12. Type 2 diabetes mellitus with  peripheral neuropathy  -     gabapentin (NEURONTIN) 600 MG tablet; Take 1 tablet (600 mg total) by mouth 2 (two) times daily.       RTC in 3 months, sooner if needed    Nalini Hall MD  Department of Internal Medicine   9:24 AM     I spent a total of 30 minutes on the day of the visit.  This includes face to face time and non-face to face time preparing to see the patient (eg, review of tests), obtaining and/or reviewing separately obtained history, documenting clinical information in the electronic or other health record, independently interpreting results and communicating results to the patient/family/caregiver, or care coordinator.

## 2024-07-26 ENCOUNTER — CLINICAL SUPPORT (OUTPATIENT)
Dept: ALLERGY | Facility: CLINIC | Age: 74
End: 2024-07-26
Payer: MEDICARE

## 2024-07-26 DIAGNOSIS — J30.9 CHRONIC ALLERGIC RHINITIS: Primary | ICD-10-CM

## 2024-07-30 ENCOUNTER — PROCEDURE VISIT (OUTPATIENT)
Dept: PODIATRY | Facility: CLINIC | Age: 74
End: 2024-07-30
Payer: MEDICARE

## 2024-07-30 ENCOUNTER — TELEPHONE (OUTPATIENT)
Dept: PODIATRY | Facility: CLINIC | Age: 74
End: 2024-07-30
Payer: MEDICARE

## 2024-07-30 VITALS
SYSTOLIC BLOOD PRESSURE: 115 MMHG | DIASTOLIC BLOOD PRESSURE: 70 MMHG | HEIGHT: 62 IN | BODY MASS INDEX: 43.98 KG/M2 | TEMPERATURE: 97 F | WEIGHT: 239 LBS | HEART RATE: 66 BPM

## 2024-07-30 DIAGNOSIS — E11.40 PAINFUL DIABETIC NEUROPATHY: Primary | ICD-10-CM

## 2024-07-30 NOTE — PROGRESS NOTES
Subjective:     Patient    Vivienne Jose is a 74 y.o. female.    Problem    01/09/24: Previously followed by Rebecca Ohara and Abhay, last seen 1 year ago. Had both 1st toenails removed years ago due to ingrown/fungal nails, still has occasional spicules that bother her. Has sensitivity throughout her 1st toes and feet; has numbness, tingling, burning, shooting, cramping. On gabapentin 1200 mg/day which does improve her nerve pain and does not make her sleepy that she can tell; previously tried pregabalin which made her bones hurt. Sleepy at baseline.      02/09/24: Started duloxetine 20 mg/day at last visit for severe BLE nerve pain/symptoms, with some improvement although it may have caused intention tremors in her hands and may be causing dry mouth; also had a couple other medication changes recently so she is unsure which medications may be causing which side effects. Also on gabapentin 1800 mg/day. Has some ongoing nerve symptoms.     04/23/24: Returns for followup of BLE nerve pain/symptoms. Qutenza patches approved. Has decreased gabapentin to 600 mg/day since starting the duloxetine with no worsening of symptoms, also now less sedated. Nerve symptoms are ongoing though.     07/30/24: Returns for Qutenza application.     Primary Care Provider    Primary Care Provider: Nalini Hall MD   Last Seen: 7/12/2024     History    History obtained from patient and review of medical records.     Past Medical History:   Diagnosis Date    Allergy     Angio-edema     Arthritis     Cataract     bilateral - not removed    Cerebrovascular malformation     Cirrhosis 11/24/2020    Colon polyps     Diabetes mellitus, type 2     Diabetic peripheral neuropathy     Edema of both feet 8/19/2022    GERD (gastroesophageal reflux disease)     Herpes infection     Hypothyroidism     Kidney stones     Mild nonproliferative diabetic retinopathy of 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 edema associated with type 2 diabetes mellitus  4/18/2019    DEL RIO (nonalcoholic steatohepatitis) 8/19/2022    Nausea & vomiting 11/23/2015    Obesity, morbid     Ovarian cyst     Seizure disorder, focal motor     Sleep apnea     Type II or unspecified type diabetes mellitus with neurological manifestations, uncontrolled(250.62)     Urticaria        Past Surgical History:   Procedure Laterality Date    CARPAL TUNNEL RELEASE Right 2014    CATARACT EXTRACTION W/  INTRAOCULAR LENS IMPLANT Right 9/19/2023    Procedure: EXTRACTION, CATARACT, WITH IOL INSERTION;  Surgeon: Lyndon Ortiz MD;  Location: [facility] OR;  Service: Ophthalmology;  Laterality: Right;    CATARACT EXTRACTION W/  INTRAOCULAR LENS IMPLANT Left 10/3/2023    Procedure: EXTRACTION, CATARACT, WITH IOL INSERTION;  Surgeon: Lyndon Ortiz MD;  Location: [facility] OR;  Service: Ophthalmology;  Laterality: Left;    COLONOSCOPY      COLONOSCOPY N/A 9/13/2017    Procedure: COLONOSCOPY Golytely;  Surgeon: Gisela Wall MD;  Location: [facility];  Service: Endoscopy;  Laterality: N/A;    COLONOSCOPY N/A 9/9/2022    Procedure: COLONOSCOPY Extended Suprep;  Surgeon: Britt Jasso MD;  Location: [facility];  Service: Endoscopy;  Laterality: N/A;    CYST REMOVAL      skin; multiples    ESOPHAGOGASTRODUODENOSCOPY Left 10/15/2021    Procedure: EGD (ESOPHAGOGASTRODUODENOSCOPY);  Surgeon: Luis Fernando Taveras MD;  Location: [facility] (4TH FLR);  Service: Endoscopy;  Laterality: Left;  cirrhosis labwork am of procedure  last seizure 1973  COVID test on 10/12/21 at [facility]    ESOPHAGOGASTRODUODENOSCOPY N/A 3/10/2023    Procedure: EGD (ESOPHAGOGASTRODUODENOSCOPY);  Surgeon: Christa Caldera MD;  Location: [facility] (2ND FLR);  Service: Endoscopy;  Laterality: N/A;  Medically Urgent  cirrohsis-labs done on 2/9/23 (< 90 days)  New onset A-fib  3/1 instructions to portal-st  Precall complete- [place]    EXTRACORPOREAL SHOCK WAVE LITHOTRIPSY  2002    HYSTERECTOMY  1984    JOINT REPLACEMENT Right 03/06/2019     knee    KIDNEY STONE SURGERY      KNEE ARTHROPLASTY Right 3/6/2019    Procedure: ARTHROPLASTY, KNEE;  Surgeon: Pj Gamboa MD;  [facility];  Service: Orthopedics;  Laterality: Right;  Depuy (Stephen notified )    THYROIDECTOMY  1977         TONSILLECTOMY, ADENOIDECTOMY      TRIGGER FINGER RELEASE      TUBAL LIGATION          Objective:     Vitals  Wt Readings from Last 1 Encounters:   24 108.4 kg (238 lb 15.7 oz)     Temp Readings from Last 1 Encounters:   24 96.9 °F (36.1 °C) (Oral)     BP Readings from Last 1 Encounters:   24 115/70     Pulse Readings from Last 1 Encounters:   24 66       Dermatological Exam    Skin:  Pedal hair growth diminished and Pedal skin thin and shiny on right  Pedal hair growth diminished and Pedal skin thin and shiny on left     Nails:  10 nail(s) elongated    Vascular Exam    Arteries:  Posterior tibial artery palpable on right  Dorsalis pedis artery palpable on right  Posterior tibial artery palpable on left  Dorsalis pedis artery palpable on left    Veins:  Superficial veins unremarkable on right  Superficial veins unremarkable on left    Swellin+ nonpitting on right  1+ nonpitting on left    Neurological Exam    [place] touch test:  6/6 sites sensed, light touch intact     Musculoskeletal Exam    Footwear:  Casual on right  Casual on left    Gait Exam:   Ambulatory Status: Ambulatory  Gait: Normal  Assistive Devices: None    Foot Progression Angle:  Normal on right  Normal on left     Right Lower Extremity Additional Findings:  Right foot and ankle function, strength, and range of motion unremarkable except as noted above.     Left Lower Extremity Additional Findings:  Left foot and ankle function, strength, and range of motion unremarkable except as noted above.    Imaging and Other Tests    Imaging:  Independently reviewed and interpreted imaging, findings are as follows: N/A    Other Tests: The following A1c results were reviewed.    Hemoglobin A1C   Date Value Ref Range Status   06/03/2024 7.5 (H) 4.0 - 5.6 % Final     Comment:     ADA Screening Guidelines:  5.7-6.4%  Consistent with prediabetes  >or=6.5%  Consistent with diabetes    High levels of fetal hemoglobin interfere with the HbA1C  assay. Heterozygous hemoglobin variants (HbS, HgC, etc)do  not significantly interfere with this assay.   However, presence of multiple variants may affect accuracy.     03/08/2024 7.2 (H) 4.0 - 5.6 % Final     Comment:     ADA Screening Guidelines:  5.7-6.4%  Consistent with prediabetes  >or=6.5%  Consistent with diabetes    High levels of fetal hemoglobin interfere with the HbA1C  assay. Heterozygous hemoglobin variants (HbS, HgC, etc)do  not significantly interfere with this assay.   However, presence of multiple variants may affect accuracy.     08/17/2023 6.7 (H) 4.0 - 5.6 % Final     Comment:     ADA Screening Guidelines:  5.7-6.4%  Consistent with prediabetes  >or=6.5%  Consistent with diabetes    High levels of fetal hemoglobin interfere with the HbA1C  assay. Heterozygous hemoglobin variants (HbS, HgC, etc)do  not significantly interfere with this assay.   However, presence of multiple variants may affect accuracy.           Assessment:     Encounter Diagnosis   Name Primary?    Painful diabetic neuropathy Yes              Plan:     I counseled the patient on her conditions, their implications and medical management.    Diabetic foot with peripheral neuropathy: chronic stable   -Performed shoe inspection and diabetic foot education. Reviewed importance of blood glucose control, proper nutrition, and foot hygiene to minimize risk of complications of diabetes. Recommended daily foot inspections, daily moisturizer to feet, avoiding sharp instruments to feet, appropriate footwear at all times when ambulating, and following up regularly for routine foot care.   -Diabetic foot risk: 2023 IWGDF very low ulcer risk.   -Next foot exam due January  2025.  -Will return for routine foot care.     Painful diabetic neuropathy: chronic stable  -Continue gabapentin 600 mg/day and duloxetine 20 mg/day.    -Qutenza patches x4 applied to both feet, 30 minute application, removed and cleansed. Ordered next Qutenza patches for both feet.         Return to clinic in 3 months for nerve pain, call sooner PRN.

## 2024-08-02 ENCOUNTER — OFFICE VISIT (OUTPATIENT)
Dept: URGENT CARE | Facility: CLINIC | Age: 74
End: 2024-08-02
Payer: MEDICARE

## 2024-08-02 VITALS
RESPIRATION RATE: 20 BRPM | HEIGHT: 62 IN | TEMPERATURE: 101 F | WEIGHT: 239 LBS | DIASTOLIC BLOOD PRESSURE: 65 MMHG | BODY MASS INDEX: 43.98 KG/M2 | OXYGEN SATURATION: 95 % | SYSTOLIC BLOOD PRESSURE: 125 MMHG | HEART RATE: 70 BPM

## 2024-08-02 DIAGNOSIS — R50.9 FEVER, UNSPECIFIED FEVER CAUSE: Primary | ICD-10-CM

## 2024-08-02 DIAGNOSIS — U07.1 COVID: ICD-10-CM

## 2024-08-02 LAB
CTP QC/QA: YES
SARS-COV-2 AG RESP QL IA.RAPID: POSITIVE

## 2024-08-02 RX ORDER — IBUPROFEN 600 MG/1
600 TABLET ORAL
Status: COMPLETED | OUTPATIENT
Start: 2024-08-02 | End: 2024-08-02

## 2024-08-02 RX ORDER — NIRMATRELVIR AND RITONAVIR 300-100 MG
KIT ORAL
Qty: 30 TABLET | Refills: 0 | Status: SHIPPED | OUTPATIENT
Start: 2024-08-02

## 2024-08-02 RX ADMIN — IBUPROFEN 600 MG: 600 TABLET ORAL at 06:08

## 2024-08-02 NOTE — PROGRESS NOTES
"Subjective:      Patient ID: Vivienne Jose is a 74 y.o. female.    Vitals:  height is 5' 2" (1.575 m) and weight is 108.4 kg (238 lb 15.7 oz). Her temporal temperature is 101 °F (38.3 °C) (abnormal). Her blood pressure is 125/65 and her pulse is 70. Her respiration is 20 and oxygen saturation is 95%.     Chief Complaint: Cough (With fever and a sore throat associated. )    Patient is a 74 y.o. female with the compliant of a cough, fever, sore throat, and congestion that presented on yesterday, she reports with not taking any medication for her current symptoms.     Cough  This is a new problem. The current episode started in the past 7 days. The problem has been gradually worsening. The problem occurs every few minutes. The cough is Productive of sputum. Associated symptoms include chest pain, chills, ear pain (left ear only.), a fever, headaches, nasal congestion, postnasal drip, a sore throat and sweats. Pertinent negatives include no heartburn, hemoptysis, myalgias, rash, rhinorrhea, shortness of breath, weight loss or wheezing. Nothing aggravates the symptoms. Risk factors for lung disease include occupational exposure. She has tried nothing for the symptoms. Her past medical history is significant for environmental allergies and pneumonia. There is no history of asthma, bronchiectasis, bronchitis, COPD or emphysema.       Constitution: Positive for chills and fever.   HENT:  Positive for ear pain (left ear only.), postnasal drip and sore throat.    Cardiovascular:  Positive for chest pain.   Respiratory:  Positive for cough. Negative for bloody sputum, shortness of breath and wheezing.    Gastrointestinal:  Negative for heartburn.   Musculoskeletal:  Negative for muscle ache.   Skin:  Negative for rash.   Allergic/Immunologic: Positive for environmental allergies.   Neurological:  Positive for headaches.      Objective:     Physical Exam   Constitutional: She does not appear ill. No distress.   HENT:   Head: " "Normocephalic.   Cardiovascular: Normal rate.   Pulmonary/Chest: Effort normal and breath sounds normal. No stridor. No respiratory distress. She has no wheezes. She has no rhonchi. She has no rales.   Abdominal: Normal appearance.   Neurological: She is alert.   Skin: Skin is warm and dry.   Psychiatric: Her behavior is normal.       Assessment:     1. Fever, unspecified fever cause    2. Cough, unspecified type        Plan:       Fever, unspecified fever cause  -     SARS Coronavirus 2 Antigen, POCT Manual Read    Cough, unspecified type  -     SARS Coronavirus 2 Antigen, POCT Manual Read      Results for orders placed or performed in visit on 08/02/24   SARS Coronavirus 2 Antigen, POCT Manual Read   Result Value Ref Range    SARS Coronavirus 2 Antigen Positive (A) Negative     Acceptable Yes      *Note: Due to a large number of results and/or encounters for the requested time period, some results have not been displayed. A complete set of results can be found in Results Review.     Patient Instructions   HOLD Mevacor while taking Paxlovid      I spoke with the patient and reviewed results.  Covid 19 counseling and education reviewed.  No questions.      - Rest at home.     - Drink plenty of fluids so you won't get dehydrated.      Avoid taking Decongestants such as Sudafed, pseudoephedrine, phenylephrine or meds that say "cold," "sinus" or "-D".      - Cough recommendations:   Warm tea with honey can help with cough. Honey is a natural cough suppressant.  - Dextromethorphan (DM) is a cough suppressant over the counter (ie. mucinex DM OR delsym).        - Congestion recommendations:    - Mucinex (guaifenesin) twice a day (or as directed) to help loosen mucous.       - Fever/Pain recommendations:  Alternate Tylenol or Ibuprofen as directed for fever/pain.   - Motrin/Ibuprofen every 6-8 hours for pain and inflammation. Do not take ibuprofen if you have a history of GI bleeding, kidney disease, or if " you take blood thinners.    - Tylenol/acetaminophen every 6-8 hours for added pain relief.  Avoid tylenol if you have a history of liver disease.       -Sore throat recommendations: Warm fluids, warm salt water gargles, throat lozenges, tea, honey, soup, or drinking something cold or frozen.  Throat lozenges or sprays help reduce pain. Gargling with warm saltwater (1/4 teaspoon of salt in 1/2 cup of warm water) or an OTC anesthetic gargle may be useful for irritation.      - Go to the ER if you develop new or worsening symptoms.      When to seek medical advice  Call your healthcare provider right away if any of these occur:  Fever that is poorly controlled with OTC fever reducing medication  New or worsening ear pain, sinus pain, or headache  Stiff neck  You can't swallow liquids or you can't open your mouth wide because of throat pain  Signs of dehydration. These include very dark urine or no urine, sunken eyes, and dizziness.  Trouble breathing or noisy breathing  Muffled voice  Rash

## 2024-08-02 NOTE — PATIENT INSTRUCTIONS
"HOLD Mevacor while taking Paxlovid      I spoke with the patient and reviewed results.  Covid 19 counseling and education reviewed.  No questions.      - Rest at home.     - Drink plenty of fluids so you won't get dehydrated.      Avoid taking Decongestants such as Sudafed, pseudoephedrine, phenylephrine or meds that say "cold," "sinus" or "-D".      - Cough recommendations:   Warm tea with honey can help with cough. Honey is a natural cough suppressant.  - Dextromethorphan (DM) is a cough suppressant over the counter (ie. mucinex DM OR delsym).        - Congestion recommendations:    - Mucinex (guaifenesin) twice a day (or as directed) to help loosen mucous.       - Fever/Pain recommendations:  Alternate Tylenol or Ibuprofen as directed for fever/pain.   - Motrin/Ibuprofen every 6-8 hours for pain and inflammation. Do not take ibuprofen if you have a history of GI bleeding, kidney disease, or if you take blood thinners.    - Tylenol/acetaminophen every 6-8 hours for added pain relief.  Avoid tylenol if you have a history of liver disease.       -Sore throat recommendations: Warm fluids, warm salt water gargles, throat lozenges, tea, honey, soup, or drinking something cold or frozen.  Throat lozenges or sprays help reduce pain. Gargling with warm saltwater (1/4 teaspoon of salt in 1/2 cup of warm water) or an OTC anesthetic gargle may be useful for irritation.      - Go to the ER if you develop new or worsening symptoms.      When to seek medical advice  Call your healthcare provider right away if any of these occur:  Fever that is poorly controlled with OTC fever reducing medication  New or worsening ear pain, sinus pain, or headache  Stiff neck  You can't swallow liquids or you can't open your mouth wide because of throat pain  Signs of dehydration. These include very dark urine or no urine, sunken eyes, and dizziness.  Trouble breathing or noisy breathing  Muffled voice  Rash   "

## 2024-08-08 ENCOUNTER — HOSPITAL ENCOUNTER (EMERGENCY)
Facility: HOSPITAL | Age: 74
Discharge: HOME | End: 2024-08-08
Attending: EMERGENCY MEDICINE
Payer: MEDICARE

## 2024-08-08 VITALS
RESPIRATION RATE: 18 BRPM | HEIGHT: 62 IN | BODY MASS INDEX: 44.16 KG/M2 | OXYGEN SATURATION: 96 % | SYSTOLIC BLOOD PRESSURE: 129 MMHG | HEART RATE: 76 BPM | TEMPERATURE: 98 F | WEIGHT: 240 LBS | DIASTOLIC BLOOD PRESSURE: 61 MMHG

## 2024-08-08 DIAGNOSIS — S32.010A CLOSED COMPRESSION FRACTURE OF BODY OF L1 VERTEBRA: Primary | ICD-10-CM

## 2024-08-08 DIAGNOSIS — N30.00 ACUTE CYSTITIS WITHOUT HEMATURIA: ICD-10-CM

## 2024-08-08 LAB
BACTERIA #/AREA URNS HPF: ABNORMAL /HPF
BILIRUB UR QL STRIP: NEGATIVE
CLARITY UR: ABNORMAL
COLOR UR: YELLOW
GLUCOSE UR QL STRIP: NEGATIVE
HGB UR QL STRIP: NEGATIVE
KETONES UR QL STRIP: NEGATIVE
LEUKOCYTE ESTERASE UR QL STRIP: ABNORMAL
MICROSCOPIC COMMENT: ABNORMAL
NITRITE UR QL STRIP: NEGATIVE
PH UR STRIP: 7 [PH] (ref 5–8)
PROT UR QL STRIP: NEGATIVE
SP GR UR STRIP: 1.02 (ref 1–1.03)
SQUAMOUS #/AREA URNS HPF: 3 /HPF
URN SPEC COLLECT METH UR: ABNORMAL
UROBILINOGEN UR STRIP-ACNC: NEGATIVE EU/DL
WBC #/AREA URNS HPF: 25 /HPF (ref 0–5)

## 2024-08-08 PROCEDURE — 99284 EMERGENCY DEPT VISIT MOD MDM: CPT | Mod: 25,HCNC

## 2024-08-08 PROCEDURE — 87186 SC STD MICRODIL/AGAR DIL: CPT | Mod: HCNC | Performed by: PHYSICIAN ASSISTANT

## 2024-08-08 PROCEDURE — 87088 URINE BACTERIA CULTURE: CPT | Mod: HCNC | Performed by: PHYSICIAN ASSISTANT

## 2024-08-08 PROCEDURE — 87086 URINE CULTURE/COLONY COUNT: CPT | Mod: HCNC | Performed by: PHYSICIAN ASSISTANT

## 2024-08-08 PROCEDURE — 81000 URINALYSIS NONAUTO W/SCOPE: CPT | Mod: HCNC | Performed by: PHYSICIAN ASSISTANT

## 2024-08-08 RX ORDER — CEPHALEXIN 500 MG/1
500 CAPSULE ORAL EVERY 12 HOURS
Qty: 14 CAPSULE | Refills: 0 | Status: SHIPPED | OUTPATIENT
Start: 2024-08-08 | End: 2024-08-15

## 2024-08-08 RX ORDER — HYDROCODONE BITARTRATE AND ACETAMINOPHEN 5; 325 MG/1; MG/1
1 TABLET ORAL EVERY 8 HOURS PRN
Qty: 9 TABLET | Refills: 0 | Status: SHIPPED | OUTPATIENT
Start: 2024-08-08 | End: 2024-08-13 | Stop reason: SDUPTHER

## 2024-08-08 RX ORDER — HYDROCODONE BITARTRATE AND ACETAMINOPHEN 5; 325 MG/1; MG/1
1 TABLET ORAL EVERY 8 HOURS PRN
Qty: 9 TABLET | Refills: 0 | Status: SHIPPED | OUTPATIENT
Start: 2024-08-08 | End: 2024-08-08

## 2024-08-08 NOTE — ED TRIAGE NOTES
"+ h/o chronic back pain. States she lifted her mother off of the floor on Sunday and felt "pop" in lower back. Since then she has had pain in lower back. States pain is worse when changing positions. Last night reports episode of urinary incontinence - "just couldn't hold it". States pain radiates to lower abdomen. Presents awake, alert.  "

## 2024-08-08 NOTE — DISCHARGE INSTRUCTIONS

## 2024-08-08 NOTE — FIRST PROVIDER EVALUATION
" Emergency Department TeleTriage Encounter Note      CHIEF COMPLAINT    Chief Complaint   Patient presents with    Back Pain     Back pain since Sunday after lifting mother off of the floor. Did not fall or lose consciousness. C/o incontinence since last night, denies dysuria. Denies numbness/tingling.       VITAL SIGNS   Initial Vitals [08/08/24 1111]   BP Pulse Resp Temp SpO2   129/61 76 18 97.6 °F (36.4 °C) 96 %      MAP       --            ALLERGIES    Review of patient's allergies indicates:   Allergen Reactions    Sulfa (sulfonamide antibiotics) Nausea Only    Ciprofloxacin     Januvia [sitagliptin]      abd pain    Lisinopril     Lotensin [benazepril]     Nexium [esomeprazole magnesium] Other (See Comments)     Gas       PROVIDER TRIAGE NOTE  This is a teletriage evaluation of a 74 y.o. female presenting to the ED complaining of low back pain. Patient reports low back pain for 4 days since picking her mother up off the floor. She reports pain stays in the lower back, but radiates around her pelvis "like a band is squeezing." She denies numbness, tingling, weakness in her legs. She had episodes of urinary incontinence last night- when she stood up to go to the bathroom she would start urinating.    Patient is alert and oriented. She speaks in complete sentences. She is sitting upright in the chair in no distress.     Initial orders will be placed and care will be transferred to an alternate provider when patient is roomed for a full evaluation. Any additional orders and the final disposition will be determined by that provider.         ORDERS  Labs Reviewed   URINALYSIS, REFLEX TO URINE CULTURE       ED Orders (720h ago, onward)      Start Ordered     Status Ordering Provider    08/08/24 1116 08/08/24 1115  X-Ray Lumbar Spine Ap And Lateral  1 time imaging         Ordered DAVID HERBERT    08/08/24 1116 08/08/24 1115  Urinalysis, Reflex to Urine Culture Urine, Clean Catch  STAT         Ordered DAVID HERBERT "              Virtual Visit Note: The provider triage portion of this emergency department evaluation and documentation was performed via [employer], a HIPAA-compliant telemedicine application, in concert with a tele-presenter in the room. A face to face patient evaluation with one of my colleagues will occur once the patient is placed in an emergency department room.      DISCLAIMER: This note was prepared with [employer] [facility] [employer] voice recognition transcription software. Garbled syntax, mangled pronouns, and other bizarre constructions may be attributed to that software system.

## 2024-08-08 NOTE — ED PROVIDER NOTES
Encounter Date: 8/8/2024       History     Chief Complaint   Patient presents with    Back Pain     Back pain since Sunday after lifting mother off of the floor. Did not fall or lose consciousness. C/o incontinence since last night, denies dysuria. Denies numbness/tingling.     74-year-old female with reported history of chronic lower back pain presents to ED with concern of exacerbation of her lower back pain after she attempted to lift on her mother 4 days ago.  No other associated injuries or trauma.  She has taken home Tylenol but with no improvement.  Pain is sharp, generalized throughout lower back region, worse with activities and movement but do significantly alleviated with rest, otherwise nonradiating with current severity 10/10.  Denies dysuria, changes in urine color or frequency.  She does report an episode of urinary incontinence yesterday evening, described as having sensation of needing to urinate but she was unable to hold urine before she made it to bathroom.  No other similar episodes or episodes of incontinence today.  No bowel incontinence or saddle anesthesia.  No numbness or focal weakness.  No fevers, chills, abdominal or flank pain, nausea or vomiting.  No other acute complaints at this time    The history is provided by the patient.     Review of patient's allergies indicates:   Allergen Reactions    Sulfa (sulfonamide antibiotics) Nausea Only    Ciprofloxacin     Januvia [sitagliptin]      abd pain    Lisinopril     Lotensin [benazepril]     Nexium [esomeprazole magnesium] Other (See Comments)     Gas     Past Medical History:   Diagnosis Date    Allergy     Angio-edema     Arthritis     Cataract     bilateral - not removed    Cerebrovascular malformation     Cirrhosis 11/24/2020    Colon polyps     Diabetes mellitus, type 2     Diabetic peripheral neuropathy     Edema of both feet 8/19/2022    GERD (gastroesophageal reflux disease)     Herpes infection     Hypothyroidism     Kidney stones      Mild nonproliferative diabetic retinopathy of 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 edema associated with type 2 diabetes mellitus 4/18/2019    DEL RIO (nonalcoholic steatohepatitis) 8/19/2022    Nausea & vomiting 11/23/2015    Obesity, morbid     Ovarian cyst     Seizure disorder, focal motor     Sleep apnea     Type II or unspecified type diabetes mellitus with neurological manifestations, uncontrolled(250.62)     Urticaria      Past Surgical History:   Procedure Laterality Date    CARPAL TUNNEL RELEASE Right 2014    CATARACT EXTRACTION W/  INTRAOCULAR LENS IMPLANT Right 9/19/2023    Procedure: EXTRACTION, CATARACT, WITH IOL INSERTION;  Surgeon: Lyndon Ortiz MD;  [facility];  Service: Ophthalmology;  Laterality: Right;    CATARACT EXTRACTION W/  INTRAOCULAR LENS IMPLANT Left 10/3/2023    Procedure: EXTRACTION, CATARACT, WITH IOL INSERTION;  Surgeon: Lyndon Ortiz MD;  [facility];  Service: Ophthalmology;  Laterality: Left;    COLONOSCOPY      COLONOSCOPY N/A 9/13/2017    Procedure: COLONOSCOPY Golytely;  Surgeon: Gisela Wall MD;  Location: [facility];  Service: Endoscopy;  Laterality: N/A;    COLONOSCOPY N/A 9/9/2022    Procedure: COLONOSCOPY Extended Suprep;  Surgeon: Britt Jasso MD;  Location: [facility];  Service: Endoscopy;  Laterality: N/A;    CYST REMOVAL      skin; multiples    ESOPHAGOGASTRODUODENOSCOPY Left 10/15/2021    Procedure: EGD (ESOPHAGOGASTRODUODENOSCOPY);  Surgeon: Luis Fernando Taveras MD;  Location: [facility] (4TH FLR);  Service: Endoscopy;  Laterality: Left;  cirrhosis labwork am of procedure  last seizure 1973  COVID test on 10/12/21 at [facility]    ESOPHAGOGASTRODUODENOSCOPY N/A 3/10/2023    Procedure: EGD (ESOPHAGOGASTRODUODENOSCOPY);  Surgeon: Christa Caldera MD;  Location: [facility] (2ND FLR);  Service: Endoscopy;  Laterality: N/A;  Medically Urgent  cirrohsis-labs done on 2/9/23 (< 90 days)  New onset A-fib  3/1 instructions to portal-st  [facility]  complete- [place]    EXTRACORPOREAL SHOCK WAVE LITHOTRIPSY  2002    HYSTERECTOMY  1984    JOINT REPLACEMENT Right 03/06/2019    knee    KIDNEY STONE SURGERY      KNEE ARTHROPLASTY Right 3/6/2019    Procedure: ARTHROPLASTY, KNEE;  Surgeon: Pj Gamboa MD;  Location: [facility] OR;  Service: Orthopedics;  Laterality: Right;  Depuy (Stephen notified 2/21, CC)    THYROIDECTOMY  1977         TONSILLECTOMY, ADENOIDECTOMY      TRIGGER FINGER RELEASE      TUBAL LIGATION       Family History   Problem Relation Name Age of Onset    Skin cancer Mother      Macular degeneration Mother      Dementia Mother      Hypertension Mother      Cancer Father      Arthritis Father      Gout Father      No Known Problems Daughter Madisyn     Diabetes Daughter Rudy     Hypertension Daughter Rudy     Arthritis Daughter Arianna     Allergies Son Lyndon     Allergic rhinitis Son Lyndon     Glaucoma Maternal Aunt          Great Maternal Aunt    Leukemia Maternal Uncle      Amblyopia Neg Hx      Cataracts Neg Hx      Retinal detachment Neg Hx      Strabismus Neg Hx      Stroke Neg Hx      Thyroid disease Neg Hx      Kidney disease Neg Hx      Angioedema Neg Hx      Asthma Neg Hx      Atopy Neg Hx      Eczema Neg Hx      Immunodeficiency Neg Hx      Rhinitis Neg Hx      Urticaria Neg Hx       Social History     Tobacco Use    Smoking status: Never     Passive exposure: Never    Smokeless tobacco: Never   Substance Use Topics    Alcohol use: No     Alcohol/week: 0.0 standard drinks of alcohol    Drug use: No     Review of Systems   Constitutional:  Negative for chills and fever.   Cardiovascular:  Negative for chest pain.   Gastrointestinal:  Negative for abdominal pain, diarrhea, nausea and vomiting.   Genitourinary:  Negative for dysuria and hematuria.   Musculoskeletal:  Positive for back pain.   Neurological:  Negative for weakness and numbness.       Physical Exam     Initial Vitals [08/08/24 1111]   BP Pulse Resp Temp SpO2   129/61 76 18 97.6  °F (36.4 °C) 96 %      MAP       --         Physical Exam    Vitals reviewed.  Constitutional: She appears well-developed and well-nourished. She is active. She does not have a sickly appearance. She does not appear ill. No distress.   HENT:   Head: Normocephalic and atraumatic.   Neck:   Normal range of motion.  Pulmonary/Chest: Effort normal.   Abdominal: There is no abdominal tenderness.   Musculoskeletal:      Cervical back: Normal range of motion.      Comments: Generalized tenderness reported throughout lower lumbar region.  She denies midline bony tenderness.  Appropriate sensation into bilateral lower extremities with no lower extremity edema.  Ambulatory in ED without assistance     Neurological: She is alert. GCS eye subscore is 4. GCS verbal subscore is 5. GCS motor subscore is 6.   Skin: Skin is warm and dry.   Psychiatric: She has a normal mood and affect. Her speech is normal and behavior is normal.         ED Course   Procedures  Labs Reviewed   URINALYSIS, REFLEX TO URINE CULTURE - Abnormal       Result Value    Specimen UA Urine, Clean Catch      Color, UA Yellow      Appearance, UA Hazy (*)     pH, UA 7.0      Specific Gravity, UA 1.020      Protein, UA Negative      Glucose, UA Negative      Ketones, UA Negative      Bilirubin (UA) Negative      Occult Blood UA Negative      Nitrite, UA Negative      Urobilinogen, UA Negative      Leukocytes, UA 1+ (*)     Narrative:     Specimen Source->Urine   URINALYSIS MICROSCOPIC - Abnormal    WBC, UA 25 (*)     Bacteria Many (*)     Squam Epithel, UA 3      Microscopic Comment SEE COMMENT      Narrative:     Specimen Source->Urine   CULTURE, URINE          Imaging Results              X-Ray Lumbar Spine Ap And Lateral (Final result)  Result time 08/08/24 13:59:55      Final result by Stephen Gutierrez MD (08/08/24 13:59:55)                   Impression:      Question new mild height loss of the T12-L1 vertebral body superior endplates when compared to  prior CT performed 09/18/2023.  Findings may be recent there is history of trauma.  MRI could be performed for further characterization, as warranted clinically.      Electronically signed by: Stephen Gutierrez  Date:    08/08/2024  Time:    13:59               Narrative:    EXAMINATION:  XR LUMBAR SPINE AP AND LATERAL    CLINICAL HISTORY:  back pain;    TECHNIQUE:  AP, lateral and spot images were performed of the lumbar spine.    COMPARISON:  Lumbar spine radiograph 10/05/2023 and prior.    FINDINGS:  There appear to be 5 non-rib-bearing lumbar type vertebra.    Grade 1 anterolisthesis of L4 on L5, as before.    Allowing for slight differences in technique and patient positioning, the appearance of chronic mild height loss of the T12 and L1 vertebra superior endplates appears likely new compared to CT performed 09/18/2020.    No definite acute findings in the visualized abdomen or pelvis.    Phleboliths are present.    Degenerative endplate and facet sclerosis.    Aortoiliac atherosclerotic calcifications.                                       Medications - No data to display  Medical Decision Making  Patient presents with concern of exacerbation of her back pain after lifting on her mother 4 days ago.  No numbness or focal weakness.  Afebrile with vitals WNL.  Patient overall well-appearing on exam.  No neurological deficits noted    DDx:  Including but not limited to strain, sprain, spasm, radiculopathy, neuropathy, sciatica, DDD, arthritis, less likely cauda equina    Lengthy discussion was made with patient.  Patient reports during episode of urinating on herself yesterday evening, she did have sensation that she needed to urinate but was unable to make it to bathroom before urination occurred.  She has had no other associated episodes of incontinence along with no saddle anesthesia, numbness or focal weakness.  No significant neurological deficits are noted on today's exam.  She was ambulatory in ED without  assistance.  CXR does no new mild height loss of the T12-L1 superior endplate that is new compared to recent imaging.  UA does also noted to have some evidence to suggest urinary infection.  Urine will be sent to culture.  Will continue with supportive care for her symptoms.  Will give short prescription for pain medication for pain control along with prescription for Keflex for UTI coverage.  She will be referred to back and spine [facility] for continued evaluation of her superior endplate compression fracture.  She was extensively encouraged to continue monitor her symptoms closely with activities and movements as tolerated, close follow-up and ED return precautions.  Patient states her understanding and agrees with plan.    Risk  Prescription drug management.                                      Clinical Impression:  Final diagnoses:  [S32.010A] Closed compression fracture of body of L1 vertebra (Primary)  [N30.00] Acute cystitis without hematuria          ED Disposition Condition    Discharge Stable          ED Prescriptions       Medication Sig Dispense Start Date End Date Auth. Provider    HYDROcodone-acetaminophen (NORCO) 5-325 mg per tablet  (Status: Discontinued) Take 1 tablet by mouth every 8 (eight) hours as needed for Pain. 9 tablet 8/8/2024 8/8/2024 Justino Willingham PA-C    cephALEXin (KEFLEX) 500 MG capsule Take 1 capsule (500 mg total) by mouth every 12 (twelve) hours. for 7 days 14 capsule 8/8/2024 8/15/2024 Justino Willingham PA-C    HYDROcodone-acetaminophen (NORCO) 5-325 mg per tablet Take 1 tablet by mouth every 8 (eight) hours as needed for Pain. 9 tablet 8/8/2024 -- Justino Willingham PA-C          Follow-up Information       Follow up With Specialties Details Why Contact Info    Nalini Hall MD Internal Medicine   [address] [facility] [id number] [phone]               Justino Willingham PA-C  08/08/24 1592

## 2024-08-09 ENCOUNTER — PATIENT OUTREACH (OUTPATIENT)
Dept: EMERGENCY MEDICINE | Facility: HOSPITAL | Age: 74
End: 2024-08-09
Payer: MEDICARE

## 2024-08-09 ENCOUNTER — PATIENT MESSAGE (OUTPATIENT)
Dept: PRIMARY CARE CLINIC | Facility: CLINIC | Age: 74
End: 2024-08-09
Payer: MEDICARE

## 2024-08-09 ENCOUNTER — TELEPHONE (OUTPATIENT)
Dept: PRIMARY CARE CLINIC | Facility: CLINIC | Age: 74
End: 2024-08-09
Payer: MEDICARE

## 2024-08-09 NOTE — PROGRESS NOTES
DATE: 8/14/2024  PATIENT: Vivienne Jose    Supervising Physician: Jose Amaro M.D.    CHIEF COMPLAINT: back pain    HISTORY:  Vivienne Jose is a 74 y.o. female with pmhx of CAD, CKD3, and osteoporosis here for initial evaluation of low back and bilateral lateral leg pain (Back - 6, Leg - 0).  The pain in the low back is what bothers her most.  The pain has been present for 10 days after helping her mother off of the ground. The patient describes the pain as aching.  The pain is worse with walking and improved by sitting. There is no associated numbness and tingling. There is subjective weakness. She presents today with a walker.  Prior treatments have included Norco, Tylenol, Gabapentin, but no PT, ELANA or surgery.  The patient denies myelopathic symptoms such as handwriting changes or difficulty with buttons/coins/keys. Denies perineal paresthesias, bowel/bladder dysfunction.    PAST MEDICAL/SURGICAL HISTORY:  Past Medical History:   Diagnosis Date    Allergy     Angio-edema     Arthritis     Cataract     bilateral - not removed    Cerebrovascular malformation     Cirrhosis 11/24/2020    Colon polyps     Diabetes mellitus, type 2     Diabetic peripheral neuropathy     Edema of both feet 8/19/2022    GERD (gastroesophageal reflux disease)     Herpes infection     Hypothyroidism     Kidney stones     Mild nonproliferative diabetic retinopathy of 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 edema associated with type 2 diabetes mellitus 4/18/2019    DEL RIO (nonalcoholic steatohepatitis) 8/19/2022    Nausea & vomiting 11/23/2015    Obesity, morbid     Ovarian cyst     Seizure disorder, focal motor     Sleep apnea     Type II or unspecified type diabetes mellitus with neurological manifestations, uncontrolled(250.62)     Urticaria      Past Surgical History:   Procedure Laterality Date    CARPAL TUNNEL RELEASE Right 2014    CATARACT EXTRACTION W/  INTRAOCULAR LENS IMPLANT Right 9/19/2023    Procedure: EXTRACTION, CATARACT, WITH IOL  INSERTION;  Surgeon: Lyndon Ortiz MD;  Location: [facility] OR;  Service: Ophthalmology;  Laterality: Right;    CATARACT EXTRACTION W/  INTRAOCULAR LENS IMPLANT Left 10/3/2023    Procedure: EXTRACTION, CATARACT, WITH IOL INSERTION;  Surgeon: Lyndon Ortiz MD;  Location: [facility] OR;  Service: Ophthalmology;  Laterality: Left;    COLONOSCOPY      COLONOSCOPY N/A 9/13/2017    Procedure: COLONOSCOPY Golytely;  Surgeon: Gisela Wall MD;  Location: [facility] ENDO;  Service: Endoscopy;  Laterality: N/A;    COLONOSCOPY N/A 9/9/2022    Procedure: COLONOSCOPY Extended Suprep;  Surgeon: Britt Jasso MD;  Location: [facility] ENDO;  Service: Endoscopy;  Laterality: N/A;    CYST REMOVAL      skin; multiples    ESOPHAGOGASTRODUODENOSCOPY Left 10/15/2021    Procedure: EGD (ESOPHAGOGASTRODUODENOSCOPY);  Surgeon: Luis Fernando Taveras MD;  Location: [facility] (4TH FLR);  Service: Endoscopy;  Laterality: Left;  cirrhosis labwork am of procedure  last seizure 1973  COVID test on 10/12/21 at [facility]    ESOPHAGOGASTRODUODENOSCOPY N/A 3/10/2023    Procedure: EGD (ESOPHAGOGASTRODUODENOSCOPY);  Surgeon: Christa Caldera MD;  Location: [facility] (2ND FLR);  Service: Endoscopy;  Laterality: N/A;  Medically Urgent  cirrohsis-labs done on 2/9/23 (< 90 days)  New onset A-fib  3/1 instructions to portal-st  Precall complete- [place]    EXTRACORPOREAL SHOCK WAVE LITHOTRIPSY  2002    HYSTERECTOMY  1984    JOINT REPLACEMENT Right 03/06/2019    knee    KIDNEY STONE SURGERY      KNEE ARTHROPLASTY Right 3/6/2019    Procedure: ARTHROPLASTY, KNEE;  Surgeon: Pj Gamboa MD;  Location: [facility] OR;  Service: Orthopedics;  Laterality: Right;  Depuy (Stephen notified 2/21, CC)    THYROIDECTOMY  1977         TONSILLECTOMY, ADENOIDECTOMY      TRIGGER FINGER RELEASE      TUBAL LIGATION         Medications:   Current Outpatient Medications on File Prior to Visit   Medication Sig Dispense Refill    ascorbic acid, vitamin C, (VITAMIN C) 100 MG tablet  Take 500 mg by mouth 2 (two) times daily.       aspirin (ECOTRIN) 81 MG EC tablet Take 81 mg by mouth once daily.      betamethasone dipropionate (DIPROLENE) 0.05 % ointment Apply topically 2 (two) times daily. Use to affected areas for up to 2 weeks then take a 1 week break or decrease to 3 times weekly. Do not apply to groin or face. Use to spot on ear when flaring 15 g 2    blood sugar diagnostic (TRUE METRIX GLUCOSE TEST STRIP) Strp TEST TWO TIMES DAILY 200 strip 6    blood-glucose meter Misc [employer] True Metrix Air meter 1 each 0    cephALEXin (KEFLEX) 500 MG capsule Take 1 capsule (500 mg total) by mouth every 12 (twelve) hours. for 7 days 14 capsule 0    DULoxetine (CYMBALTA) 20 MG capsule Take 1 capsule (20 mg total) by mouth once daily. 90 capsule 3    estradioL (ESTRING) 2 mg (7.5 mcg /24 hour) vaginal ring Remove old Estring, place the new one every 3 months. 1 each 3    fluticasone propionate (FLONASE) 50 mcg/actuation nasal spray 2 sprays (100 mcg total) by Each Nostril route once daily. 48 g 2    furosemide (LASIX) 20 MG tablet Take 1 tablet (20 mg total) by mouth every other day. 15 tablet 11    gabapentin (NEURONTIN) 600 MG tablet Take 1 tablet (600 mg total) by mouth 2 (two) times daily.      glipiZIDE (GLUCOTROL) 5 MG tablet TAKE 1 TABLET TWICE DAILY BEFORE MEALS 180 tablet 1    HYDROcodone-acetaminophen (NORCO) 5-325 mg per tablet Take 1 tablet by mouth every 12 (twelve) hours as needed for Pain. 14 tablet 0    insulin degludec (TRESIBA FLEXTOUCH U-100) 100 unit/mL (3 mL) insulin pen Inject 32 Units into the skin every evening.      ipratropium (ATROVENT) 42 mcg (0.06 %) nasal spray 2 sprays by Each Nostril route 4 (four) times daily. 45 mL 4    ketotifen (ZADITOR) 0.025 % (0.035 %) ophthalmic solution Place 1-2 drops into both eyes once daily.      L.acid-B.bifidum-B.animal-FOS 25 billion cell -100 mg Cap Take 1 capsule by mouth once daily.      lancets (TRUEPLUS LANCETS) 28 gauge Misc Inject 1  "lancet into the skin 2 (two) times daily before meals. 200 each 6    levothyroxine (SYNTHROID) 75 MCG tablet TAKE 1 TABLET EVERY DAY 30 tablet 0    loratadine (CLARITIN) 10 mg tablet Take 10 mg by mouth once daily.      lovastatin (MEVACOR) 40 MG tablet TAKE 1 TABLET NIGHTLY (SUBSTITUTED FOR MEVACOR) 90 tablet 3    metFORMIN (GLUCOPHAGE) 1000 MG tablet TAKE 1 TABLET TWICE DAILY WITH MEALS 180 tablet 1    mv-mn/folic acid/vit [id number] (ALIVE ONCE DAILY WOMEN 50 PLUS ORAL) Take 1 tablet by mouth once daily.      nirmatrelvir-ritonavir (PAXLOVID) 300 mg (150 mg x 2)-100 mg copackaged tablets (EUA) Take 3 tablets by mouth 2 (two) times daily. Each dose contains 2 nirmatrelvir (pink tablets) and 1 ritonavir (white tablet). Take all 3 tablets together 30 tablet 0    omeprazole (PRILOSEC) 20 MG capsule TAKE 1 CAPSULE BY MOUTH DAILY AS NEEDED (ACID REFLUX). 90 capsule 3    pen needle, diabetic (BD ULTRA-FINE EDUARDO PEN NEEDLE) 32 gauge x 5/32" Ndle USE AS DIRECTED 200 each 6    semaglutide (RYBELSUS) 14 mg tablet Take 1 tablet (14 mg total) by mouth once daily. 90 tablet 3    spironolactone (ALDACTONE) 50 MG tablet Take 1 tablet (50 mg total) by mouth every other day. 15 tablet 11    UNABLE TO FIND Take 1 tablet by mouth 2 (two) times a day. medication name: Magnesium 400mg, Calcium 1000 mg, D3 15mcg, Zinc 15mg      valACYclovir (VALTREX) 500 MG tablet Take 1 tablet (500 mg total) by mouth once daily. (Patient taking differently: Take 500 mg by mouth as needed.) 90 tablet 3    valsartan (DIOVAN) 160 MG tablet Take 0.5 tablets (80 mg total) by mouth once daily.      azelastine (ASTELIN) 137 mcg (0.1 %) nasal spray 2 sprays (274 mcg total) by Nasal route 2 (two) times daily. 120 mL 4     Current Facility-Administered Medications on File Prior to Visit   Medication Dose Route Frequency Provider Last Rate Last Admin    capsaicin-skin cleanser patch 4 patch  4 patch Topical (Top) 1 time in Clinic/HOD            Social History: "   Social History     Socioeconomic History    Marital status:    Occupational History    Occupation: retired     Employer: [employer]   Tobacco Use    Smoking status: Never     Passive exposure: Never    Smokeless tobacco: Never   Substance and Sexual Activity    Alcohol use: No     Alcohol/week: 0.0 standard drinks of alcohol    Drug use: No    Sexual activity: Yes     Partners: Male     Social Determinants of Health     Financial Resource Strain: Low Risk  (8/9/2024)    Overall Financial Resource Strain (CARDIA)     Difficulty of Paying Living Expenses: Not hard at all   Food Insecurity: No Food Insecurity (8/9/2024)    Hunger Vital Sign     Worried About Running Out of Food in the Last Year: Never true     Ran Out of Food in the Last Year: Never true   Transportation Needs: No Transportation Needs (8/9/2024)    PRAPARE - Transportation     Lack of Transportation (Medical): No     Lack of Transportation (Non-Medical): No   Physical Activity: Inactive (1/17/2024)    Exercise Vital Sign     Days of Exercise per Week: 0 days     Minutes of Exercise per Session: 0 min   Stress: Stress Concern Present (8/9/2024)    [employer] - Occupational Stress Questionnaire     Feeling of Stress : To some extent   Housing Stability: Low Risk  (8/9/2024)    Housing Stability Vital Sign     Unable to Pay for Housing in the Last Year: No     Homeless in the Last Year: No       REVIEW OF SYSTEMS:  Constitution: Negative. Negative for chills, fever and night sweats.   Cardiovascular: Negative for chest pain and syncope.   Respiratory: Negative for cough and shortness of breath.   Gastrointestinal: See HPI. Negative for nausea/vomiting. Negative for abdominal pain.  Genitourinary: See HPI. Negative for discoloration or dysuria.  Skin: Negative for dry skin, itching and rash.   Hematologic/Lymphatic: Negative for bleeding problem. Does not bruise/bleed easily.   Musculoskeletal: Negative for  "falls and muscle weakness.   Neurological: See HPI. No seizures.   Endocrine: Negative for polydipsia, polyphagia and polyuria.   Allergic/Immunologic: Negative for hives and persistent infections.     EXAM:  Ht 5' 2" (1.575 m)   Wt 106.4 kg (234 lb 7.4 oz)   LMP  (LMP Unknown)   BMI 42.88 kg/m²     General: The patient is a 74 y.o. female in no apparent distress, the patient is oriented to person, place and time.  Psych: Normal mood and affect  HEENT: Vision grossly intact, hearing intact to the spoken word.  Lungs: Respirations unlabored.  Gait: Normal station and gait, no difficulty with toe or heel walk.   Skin: Dorsal lumbar skin negative for rashes, lesions, hairy patches and surgical scars. There is mild lumbar tenderness to palpation.  Range of motion: Lumbar range of motion is acceptable.  Spinal Balance: Global saggital and coronal spinal balance acceptable, not significant for scoliosis and kyphosis.  Musculoskeletal: No pain with the range of motion of the bilateral hips. No trochanteric tenderness to palpation.  Vascular: Bilateral lower extremities warm and well perfused, dorsalis pedis pulses 2+ bilaterally.  Neurological: decreased strength and tone in all major motor groups in the bilateral lower extremities. Normal sensation to light touch in the L2-S1 dermatomes bilaterally.  Deep tendon reflexes symmetric 2+ in the bilateral lower extremities.  Negative Babinski bilaterally. Straight leg raise negative bilaterally.    IMAGING:      Today I personally reviewed AP, Lat and Flex/Ex  upright L-spine films that demonstrate T12 and L1 compression fractures.       Body mass index is 42.88 kg/m².    Hemoglobin A1C   Date Value Ref Range Status   06/03/2024 7.5 (H) 4.0 - 5.6 % Final     Comment:     ADA Screening Guidelines:  5.7-6.4%  Consistent with prediabetes  >or=6.5%  Consistent with diabetes    High levels of fetal hemoglobin interfere with the HbA1C  assay. Heterozygous hemoglobin variants " (HbS, HgC, etc)do  not significantly interfere with this assay.   However, presence of multiple variants may affect accuracy.     03/08/2024 7.2 (H) 4.0 - 5.6 % Final     Comment:     ADA Screening Guidelines:  5.7-6.4%  Consistent with prediabetes  >or=6.5%  Consistent with diabetes    High levels of fetal hemoglobin interfere with the HbA1C  assay. Heterozygous hemoglobin variants (HbS, HgC, etc)do  not significantly interfere with this assay.   However, presence of multiple variants may affect accuracy.     08/17/2023 6.7 (H) 4.0 - 5.6 % Final     Comment:     ADA Screening Guidelines:  5.7-6.4%  Consistent with prediabetes  >or=6.5%  Consistent with diabetes    High levels of fetal hemoglobin interfere with the HbA1C  assay. Heterozygous hemoglobin variants (HbS, HgC, etc)do  not significantly interfere with this assay.   However, presence of multiple variants may affect accuracy.             ASSESSMENT/PLAN:    Vivienne was seen today for low-back pain and back pain.    Diagnoses and all orders for this visit:    Dorsalgia, unspecified  -     MRI Lumbar Spine Without Contrast; Future    Closed compression fracture of body of L1 vertebra  -     Ambulatory referral/consult to [facility]  -     predniSONE (DELTASONE) 20 MG tablet; Take 1 tablet (20 mg total) by mouth 2 (two) times daily.  -     tiZANidine (ZANAFLEX) 4 MG tablet; Take 1 tablet (4 mg total) by mouth every 8 (eight) hours as needed (muscle tension).  -     MRI Lumbar Spine Without Contrast; Future  -     calcitonin, salmon, (FORTICAL) 200 unit/actuation nasal spray; 1 spray by Nasal route once daily.      Today we discussed at length all of the different treatment options including anti-inflammatories, acetaminophen, rest, ice, heat, physical therapy including strengthening and stretching exercises, home exercises, ROM, aerobic conditioning, aqua therapy, other modalities including ultrasound, massage, and dry needling, epidural steroid  injections and finally surgical intervention.  MRI ordered, I will call with results.

## 2024-08-11 LAB — BACTERIA UR CULT: ABNORMAL

## 2024-08-13 ENCOUNTER — OFFICE VISIT (OUTPATIENT)
Dept: PRIMARY CARE CLINIC | Facility: CLINIC | Age: 74
End: 2024-08-13
Payer: MEDICARE

## 2024-08-13 VITALS
DIASTOLIC BLOOD PRESSURE: 66 MMHG | OXYGEN SATURATION: 98 % | WEIGHT: 233.81 LBS | HEART RATE: 55 BPM | BODY MASS INDEX: 42.76 KG/M2 | SYSTOLIC BLOOD PRESSURE: 116 MMHG

## 2024-08-13 DIAGNOSIS — M81.0 AGE RELATED OSTEOPOROSIS, UNSPECIFIED PATHOLOGICAL FRACTURE PRESENCE: ICD-10-CM

## 2024-08-13 DIAGNOSIS — G89.29 CHRONIC BILATERAL LOW BACK PAIN WITHOUT SCIATICA: Primary | ICD-10-CM

## 2024-08-13 DIAGNOSIS — S32.010A CLOSED COMPRESSION FRACTURE OF BODY OF L1 VERTEBRA: ICD-10-CM

## 2024-08-13 DIAGNOSIS — M54.50 CHRONIC BILATERAL LOW BACK PAIN WITHOUT SCIATICA: Primary | ICD-10-CM

## 2024-08-13 DIAGNOSIS — N30.00 ACUTE CYSTITIS WITHOUT HEMATURIA: ICD-10-CM

## 2024-08-13 DIAGNOSIS — R93.7 ABNORMAL X-RAY OF LUMBAR SPINE: ICD-10-CM

## 2024-08-13 PROCEDURE — 3066F NEPHROPATHY DOC TX: CPT | Mod: CPTII,S$GLB,, | Performed by: PHYSICIAN ASSISTANT

## 2024-08-13 PROCEDURE — 3074F SYST BP LT 130 MM HG: CPT | Mod: CPTII,S$GLB,, | Performed by: PHYSICIAN ASSISTANT

## 2024-08-13 PROCEDURE — 3051F HG A1C>EQUAL 7.0%<8.0%: CPT | Mod: CPTII,S$GLB,, | Performed by: PHYSICIAN ASSISTANT

## 2024-08-13 PROCEDURE — 3008F BODY MASS INDEX DOCD: CPT | Mod: CPTII,S$GLB,, | Performed by: PHYSICIAN ASSISTANT

## 2024-08-13 PROCEDURE — 3288F FALL RISK ASSESSMENT DOCD: CPT | Mod: CPTII,S$GLB,, | Performed by: PHYSICIAN ASSISTANT

## 2024-08-13 PROCEDURE — 1101F PT FALLS ASSESS-DOCD LE1/YR: CPT | Mod: CPTII,S$GLB,, | Performed by: PHYSICIAN ASSISTANT

## 2024-08-13 PROCEDURE — 99214 OFFICE O/P EST MOD 30 MIN: CPT | Mod: S$GLB,,, | Performed by: PHYSICIAN ASSISTANT

## 2024-08-13 PROCEDURE — 99999 PR PBB SHADOW E&M-EST. PATIENT-LVL V: CPT | Mod: PBBFAC,HCNC,, | Performed by: PHYSICIAN ASSISTANT

## 2024-08-13 PROCEDURE — 1157F ADVNC CARE PLAN IN RCRD: CPT | Mod: CPTII,S$GLB,, | Performed by: PHYSICIAN ASSISTANT

## 2024-08-13 PROCEDURE — 1125F AMNT PAIN NOTED PAIN PRSNT: CPT | Mod: CPTII,S$GLB,, | Performed by: PHYSICIAN ASSISTANT

## 2024-08-13 PROCEDURE — 3061F NEG MICROALBUMINURIA REV: CPT | Mod: CPTII,S$GLB,, | Performed by: PHYSICIAN ASSISTANT

## 2024-08-13 PROCEDURE — 1160F RVW MEDS BY RX/DR IN RCRD: CPT | Mod: CPTII,S$GLB,, | Performed by: PHYSICIAN ASSISTANT

## 2024-08-13 PROCEDURE — 4010F ACE/ARB THERAPY RXD/TAKEN: CPT | Mod: CPTII,S$GLB,, | Performed by: PHYSICIAN ASSISTANT

## 2024-08-13 PROCEDURE — 3078F DIAST BP <80 MM HG: CPT | Mod: CPTII,S$GLB,, | Performed by: PHYSICIAN ASSISTANT

## 2024-08-13 PROCEDURE — 1159F MED LIST DOCD IN RCRD: CPT | Mod: CPTII,S$GLB,, | Performed by: PHYSICIAN ASSISTANT

## 2024-08-13 RX ORDER — HYDROCODONE BITARTRATE AND ACETAMINOPHEN 5; 325 MG/1; MG/1
1 TABLET ORAL EVERY 12 HOURS PRN
Qty: 14 TABLET | Refills: 0 | Status: SHIPPED | OUTPATIENT
Start: 2024-08-13

## 2024-08-13 NOTE — ASSESSMENT & PLAN NOTE
Discussed with patient treatment options, she refuses medications, states concerns about side effects.

## 2024-08-13 NOTE — PROGRESS NOTES
"  Primary Care Provider Appointment   [facility]        Subjective:       Patient ID:  Vivienne is a 74 y.o. female being seen for Hospital Follow Up (Compression fracture L1) and Urinary Tract Infection      Chief Complaint: Hospital Follow Up (Compression fracture L1) and Urinary Tract Infection    HPI: 73 yo female presents for ER follow up for back pain. Presented to ER on 8/8 after worsening pain in back that initially occurred 4 days prior when she tried lifting her mother off the floor. She had 1 episode of urinary incontinence. No other neuro deficits. Culture was positive for E Coli >100,000 cfu. Treated with keflex.  Xray lumbar spine showed "Question new mild height loss of the T12-L1 vertebral body superior endplates when compared to prior CT performed 09/18/2023. Findings may be recent there is history of trauma. MRI could be performed for further characterization, as warranted clinically." She was given norco for pain, taking 2 daily with some relief. Will take tylenol during the day when needed. She is having muscle spasms. She is also constipated.   She is still on keflex for uti. Having urinary urgency in the morning with some leakage.   She has a f/u scheduled with back and spine tomorrow.     Past CT lumbar spine showed: "1. Broad-based disc bulge, facet hypertrophy, and ligament hypertrophy at L4/L5 consistent with spinal stenosis. . 2. Broad-based disc bulge at L5/S1 consistent with degenerative disc disease"  She has done healthy back program 2 times in the past which helped pain becoming more bearable. No ELANA in the past, no surgeries. Currently takes gabapentin 600 mg BID.     Past Medical History:   Diagnosis Date    Allergy     Angio-edema     Arthritis     Cataract     bilateral - not removed    Cerebrovascular malformation     Cirrhosis 11/24/2020    Colon polyps     Diabetes mellitus, type 2     Diabetic peripheral neuropathy     Edema of both feet " "8/19/2022    GERD (gastroesophageal reflux disease)     Herpes infection     Hypothyroidism     Kidney stones     Mild nonproliferative diabetic retinopathy of 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 edema associated with type 2 diabetes mellitus 4/18/2019    DEL RIO (nonalcoholic steatohepatitis) 8/19/2022    Nausea & vomiting 11/23/2015    Obesity, morbid     Ovarian cyst     Seizure disorder, focal motor     Sleep apnea     Type II or unspecified type diabetes mellitus with neurological manifestations, uncontrolled(250.62)     Urticaria        Review of Systems   Constitutional:  Negative for fever.   Genitourinary:  Positive for bladder incontinence. Negative for difficulty urinating and dysuria.   Musculoskeletal:  Positive for back pain.   Neurological:  Negative for numbness.             Health Maintenance         Date Due Completion Date    RSV Vaccine (Age 60+ and Pregnant patients) (1 - 1-dose 60+ series) Never done ---    COVID-19 Vaccine (5 - 2023-24 season) 09/01/2023 8/15/2022    TETANUS VACCINE 06/19/2024 6/19/2014    Mammogram 10/27/2024 10/27/2023    Influenza Vaccine (1) 09/01/2024 10/12/2023    Override on 11/16/2018: Done    Override on 9/14/2017: Done    Hemoglobin A1c 12/03/2024 6/3/2024    Diabetes Urine Screening 03/08/2025 3/8/2024    Lipid Panel 03/08/2025 3/8/2024    Foot Exam 04/09/2025 4/9/2024    Override on 1/17/2023: Done    Override on 8/2/2017: Done    Override on 4/28/2015: Done    Eye Exam 05/24/2025 5/24/2024    DEXA Scan 06/12/2025 6/12/2023    Aspirin/Antiplatelet Therapy 08/13/2025 8/13/2024    Colorectal Cancer Screening 09/09/2027 9/9/2022                   Objective:      Vitals:    08/13/24 0906   BP: 116/66   BP Location: Left arm   Patient Position: Sitting   BP Method: Medium (Manual)   Pulse: (!) 55   SpO2: 98%   Weight: 106.1 kg (233 lb 12.8 oz)     Estimated body mass index is 42.76 kg/m² as calculated from the following:    Height as of 8/8/24: 5' 2" (1.575 m).    Weight as of this " encounter: 106.1 kg (233 lb 12.8 oz).  Physical Exam  Constitutional:       Appearance: Normal appearance.   HENT:      Head: Normocephalic and atraumatic.   Cardiovascular:      Rate and Rhythm: Normal rate and regular rhythm.   Pulmonary:      Effort: Pulmonary effort is normal.      Breath sounds: Normal breath sounds.   Neurological:      Mental Status: She is alert.   Psychiatric:         Mood and Affect: Mood normal.         Thought Content: Thought content normal.           Assessment and Plan:         1. Chronic bilateral low back pain without sciatica  Assessment & Plan:  Will see back and spine tomorrow  Increase gabapentin to TID  Recommend stool softener or miralax for constipation         2. Abnormal x-ray of lumbar spine  Assessment & Plan:  Possible compression fracture. Can get MRI. Will see back and spine tomorrow      3. Age related osteoporosis, unspecified pathological fracture presence  Assessment & Plan:  Discussed with patient treatment options, she refuses medications, states concerns about side effects.       4. Acute cystitis without hematuria  Finish abx       Follow Up:   No follow-ups on file.        Amy Lado, PA-C [facility]

## 2024-08-13 NOTE — TELEPHONE ENCOUNTER
Requesting refill for a few more norco to take for short term. She will see back and spine tomorrow. She is taking only when pain is severe and taking tylenol during day time.

## 2024-08-14 ENCOUNTER — OFFICE VISIT (OUTPATIENT)
Dept: ORTHOPEDICS | Facility: CLINIC | Age: 74
End: 2024-08-14
Payer: MEDICARE

## 2024-08-14 VITALS — HEIGHT: 62 IN | BODY MASS INDEX: 43.14 KG/M2 | WEIGHT: 234.44 LBS

## 2024-08-14 DIAGNOSIS — S32.010A CLOSED COMPRESSION FRACTURE OF BODY OF L1 VERTEBRA: ICD-10-CM

## 2024-08-14 DIAGNOSIS — M54.9 DORSALGIA, UNSPECIFIED: Primary | ICD-10-CM

## 2024-08-14 PROCEDURE — 99999 PR PBB SHADOW E&M-EST. PATIENT-LVL V: CPT | Mod: PBBFAC,HCNC,, | Performed by: REGISTERED NURSE

## 2024-08-14 RX ORDER — CALCITONIN SALMON 200 [IU]/.09ML
1 SPRAY, METERED NASAL DAILY
Qty: 3.7 ML | Refills: 0 | Status: SHIPPED | OUTPATIENT
Start: 2024-08-14 | End: 2025-08-14

## 2024-08-14 RX ORDER — PREDNISONE 20 MG/1
20 TABLET ORAL 2 TIMES DAILY
Qty: 10 TABLET | Refills: 0 | Status: SHIPPED | OUTPATIENT
Start: 2024-08-14

## 2024-08-14 RX ORDER — TIZANIDINE 4 MG/1
4 TABLET ORAL EVERY 8 HOURS PRN
Qty: 60 TABLET | Refills: 1 | Status: SHIPPED | OUTPATIENT
Start: 2024-08-14

## 2024-08-15 NOTE — PROGRESS NOTES
Established Patient - Audio Only Telehealth Visit     The patient location is: [place]  The chief complaint leading to consultation is: MRI results  Visit type: Virtual visit with audio only (telephone)  Total time spent with patient: 15min     The reason for the audio only service rather than synchronous audio and video virtual visit was related to technical difficulties or patient preference/necessity.     Each patient to whom I provide medical services by telemedicine is:  (1) informed of the relationship between the physician and patient and the respective role of any other health care provider with respect to management of the patient; and (2) notified that they may decline to receive medical services by telemedicine and may withdraw from such care at any time. Patient verbally consented to receive this service via voice-only telephone call.    DATE: 9/4/2024  PATIENT: Vivienne Jose    Attending Physician: Jose Amaro M.D.    HISTORY:  Vivienne Jose is a 74 y.o. female who returns to me today for MRI results.  She was last seen by me 8/14/2024.  Today she is doing well but notes low back and bilateral lateral hip/leg pain (Back - 6, Leg - 0). She reports her mid back pain has improved since her last visit with me.  The Patient denies myelopathic symptoms such as handwriting changes or difficulty with buttons/coins/keys. Denies perineal paresthesias, bowel/bladder dysfunction.    EXAM:  LMP  (LMP Unknown)   Stable.     IMAGING:    Today I personally re- reviewed AP, Lat and Flex/Ex  upright L-spine that demonstrate T12 and L1 compression fractures.      Lumbar MRI shows acute burst compression fractures of the T12 and L1 vertebral bodies with greater vertebral body height loss of T12 with 5 mm retropulsion and resulting mild spinal canal stenosis.     There is no height or weight on file to calculate BMI.    Hemoglobin A1C   Date Value Ref Range Status   09/03/2024 6.3 (H) 4.0 - 5.6 % Final     Comment:     ADA  Screening Guidelines:  5.7-6.4%  Consistent with prediabetes  >or=6.5%  Consistent with diabetes    High levels of fetal hemoglobin interfere with the HbA1C  assay. Heterozygous hemoglobin variants (HbS, HgC, etc)do  not significantly interfere with this assay.   However, presence of multiple variants may affect accuracy.     06/03/2024 7.5 (H) 4.0 - 5.6 % Final     Comment:     ADA Screening Guidelines:  5.7-6.4%  Consistent with prediabetes  >or=6.5%  Consistent with diabetes    High levels of fetal hemoglobin interfere with the HbA1C  assay. Heterozygous hemoglobin variants (HbS, HgC, etc)do  not significantly interfere with this assay.   However, presence of multiple variants may affect accuracy.     03/08/2024 7.2 (H) 4.0 - 5.6 % Final     Comment:     ADA Screening Guidelines:  5.7-6.4%  Consistent with prediabetes  >or=6.5%  Consistent with diabetes    High levels of fetal hemoglobin interfere with the HbA1C  assay. Heterozygous hemoglobin variants (HbS, HgC, etc)do  not significantly interfere with this assay.   However, presence of multiple variants may affect accuracy.           ASSESSMENT/PLAN:    Diagnoses and all orders for this visit:    Closed compression fracture of body of L1 vertebra  -     Back/Cervical Brace For Home Use  -     X-Ray Lumbar Spine AP And Lateral; Future    Compression fracture of T12 vertebra, sequela      Follow up in the clinic with pain mgmt for RFAs.  No surgery warranted at this time.  TLSO brace until Follow up in 2 months with xray.     This service was not originating from a related E/M service provided within the previous 7 days nor will  to an E/M service or procedure within the next 24 hours or my soonest available appointment.  Prevailing standard of care was able to be met in this audio-only visit.

## 2024-08-23 ENCOUNTER — CLINICAL SUPPORT (OUTPATIENT)
Dept: ALLERGY | Facility: CLINIC | Age: 74
End: 2024-08-23
Payer: MEDICARE

## 2024-08-23 DIAGNOSIS — J30.9 CHRONIC ALLERGIC RHINITIS: Primary | ICD-10-CM

## 2024-08-23 PROCEDURE — 95115 IMMUNOTHERAPY ONE INJECTION: CPT | Mod: S$GLB,,, | Performed by: STUDENT IN AN ORGANIZED HEALTH CARE EDUCATION/TRAINING PROGRAM

## 2024-09-03 ENCOUNTER — HOSPITAL ENCOUNTER (OUTPATIENT)
Dept: RADIOLOGY | Facility: HOSPITAL | Age: 74
Discharge: HOME | End: 2024-09-03
Attending: REGISTERED NURSE
Payer: MEDICARE

## 2024-09-03 ENCOUNTER — TELEPHONE (OUTPATIENT)
Dept: PRIMARY CARE CLINIC | Facility: CLINIC | Age: 74
End: 2024-09-03
Payer: MEDICARE

## 2024-09-03 ENCOUNTER — PATIENT MESSAGE (OUTPATIENT)
Dept: PRIMARY CARE CLINIC | Facility: CLINIC | Age: 74
End: 2024-09-03
Payer: MEDICARE

## 2024-09-03 DIAGNOSIS — E87.5 HYPERKALEMIA: Primary | ICD-10-CM

## 2024-09-03 DIAGNOSIS — S32.010A CLOSED COMPRESSION FRACTURE OF BODY OF L1 VERTEBRA: ICD-10-CM

## 2024-09-03 DIAGNOSIS — M54.9 DORSALGIA, UNSPECIFIED: ICD-10-CM

## 2024-09-03 PROCEDURE — 72148 MRI LUMBAR SPINE W/O DYE: CPT | Mod: 26,,, | Performed by: RADIOLOGY

## 2024-09-03 PROCEDURE — 72148 MRI LUMBAR SPINE W/O DYE: CPT | Mod: TC

## 2024-09-03 NOTE — TELEPHONE ENCOUNTER
Call to emergency contact to reach patient. Instructed with elevated K+ lab, and med changes to stop taking spironolactone and take extra lasix today and tomorrow, drink more water and recheck potassium level Tomorrow afternoon. Appt made for 3:30 at [place] lab.

## 2024-09-03 NOTE — TELEPHONE ENCOUNTER
Pls tell patient that her potassium level is high. I want her to stop taking spironolactone and take extra lasix today and tomorrow, drink more water and recheck potassium level Tomorrow afternoon.

## 2024-09-03 NOTE — TELEPHONE ENCOUNTER
----- Message from Nalini Hall MD sent at 9/3/2024  2:55 PM CDT -----  Pls tell patient that her potassium level is high. I want her to stop taking spironolactone and take extra lasix today and tomorrow, drink more water and recheck potassium level Tomorrow afternoon.

## 2024-09-03 NOTE — TELEPHONE ENCOUNTER
----- Message from Zenobia Kaplan MA sent at 9/3/2024  3:30 PM CDT -----    ----- Message -----  From: Nalini Hall MD  Sent: 9/3/2024   2:55 PM CDT  To: [facility] Staff    Pls tell patient that her potassium level is high. I want her to stop taking spironolactone and take extra lasix today and tomorrow, drink more water and recheck potassium level Tomorrow afternoon.

## 2024-09-04 ENCOUNTER — TELEPHONE (OUTPATIENT)
Dept: ORTHOPEDICS | Facility: CLINIC | Age: 74
End: 2024-09-04
Payer: MEDICARE

## 2024-09-04 ENCOUNTER — OFFICE VISIT (OUTPATIENT)
Dept: ORTHOPEDICS | Facility: CLINIC | Age: 74
End: 2024-09-04
Payer: MEDICARE

## 2024-09-04 ENCOUNTER — LAB VISIT (OUTPATIENT)
Dept: LAB | Facility: HOSPITAL | Age: 74
End: 2024-09-04
Attending: STUDENT IN AN ORGANIZED HEALTH CARE EDUCATION/TRAINING PROGRAM
Payer: MEDICARE

## 2024-09-04 DIAGNOSIS — S32.010A CLOSED COMPRESSION FRACTURE OF BODY OF L1 VERTEBRA: Primary | ICD-10-CM

## 2024-09-04 DIAGNOSIS — S22.080S COMPRESSION FRACTURE OF T12 VERTEBRA, SEQUELA: ICD-10-CM

## 2024-09-04 DIAGNOSIS — E87.5 HYPERKALEMIA: ICD-10-CM

## 2024-09-04 LAB — POTASSIUM SERPL-SCNC: 5.7 MMOL/L (ref 3.5–5.1)

## 2024-09-04 PROCEDURE — 36415 COLL VENOUS BLD VENIPUNCTURE: CPT | Performed by: STUDENT IN AN ORGANIZED HEALTH CARE EDUCATION/TRAINING PROGRAM

## 2024-09-04 PROCEDURE — 84132 ASSAY OF SERUM POTASSIUM: CPT | Mod: HCNC | Performed by: STUDENT IN AN ORGANIZED HEALTH CARE EDUCATION/TRAINING PROGRAM

## 2024-09-04 NOTE — TELEPHONE ENCOUNTER
Spoke to patient regarding appointmentwith pain management, f/u appointment with x-ray. Patient has been scheduled for all appointments. Patient stated thank you. Thanks.

## 2024-09-05 ENCOUNTER — OFFICE VISIT (OUTPATIENT)
Dept: PRIMARY CARE CLINIC | Facility: CLINIC | Age: 74
End: 2024-09-05
Payer: MEDICARE

## 2024-09-05 ENCOUNTER — OFFICE VISIT (OUTPATIENT)
Dept: PAIN MEDICINE | Facility: CLINIC | Age: 74
End: 2024-09-05
Payer: MEDICARE

## 2024-09-05 ENCOUNTER — TELEPHONE (OUTPATIENT)
Dept: PRIMARY CARE CLINIC | Facility: CLINIC | Age: 74
End: 2024-09-05
Payer: MEDICARE

## 2024-09-05 ENCOUNTER — TELEPHONE (OUTPATIENT)
Dept: PAIN MEDICINE | Facility: CLINIC | Age: 74
End: 2024-09-05

## 2024-09-05 VITALS
DIASTOLIC BLOOD PRESSURE: 65 MMHG | HEART RATE: 70 BPM | SYSTOLIC BLOOD PRESSURE: 102 MMHG | HEIGHT: 62 IN | WEIGHT: 230.19 LBS | BODY MASS INDEX: 42.36 KG/M2

## 2024-09-05 VITALS
RESPIRATION RATE: 16 BRPM | HEART RATE: 78 BPM | DIASTOLIC BLOOD PRESSURE: 64 MMHG | BODY MASS INDEX: 42.72 KG/M2 | WEIGHT: 232.13 LBS | HEIGHT: 62 IN | OXYGEN SATURATION: 96 % | SYSTOLIC BLOOD PRESSURE: 112 MMHG

## 2024-09-05 DIAGNOSIS — E83.52 HYPERCALCEMIA: ICD-10-CM

## 2024-09-05 DIAGNOSIS — E87.5 HYPERKALEMIA: ICD-10-CM

## 2024-09-05 DIAGNOSIS — Z01.818 PREOPERATIVE CLEARANCE: Primary | ICD-10-CM

## 2024-09-05 DIAGNOSIS — M80.88XA OSTEOPOROTIC COMPRESSION FRACTURE OF VERTEBRA, INITIAL ENCOUNTER: Primary | ICD-10-CM

## 2024-09-05 PROCEDURE — 3044F HG A1C LEVEL LT 7.0%: CPT | Mod: HCNC,CPTII,S$GLB, | Performed by: STUDENT IN AN ORGANIZED HEALTH CARE EDUCATION/TRAINING PROGRAM

## 2024-09-05 PROCEDURE — 1101F PT FALLS ASSESS-DOCD LE1/YR: CPT | Mod: HCNC,CPTII,S$GLB, | Performed by: STUDENT IN AN ORGANIZED HEALTH CARE EDUCATION/TRAINING PROGRAM

## 2024-09-05 PROCEDURE — 3288F FALL RISK ASSESSMENT DOCD: CPT | Mod: HCNC,CPTII,S$GLB, | Performed by: STUDENT IN AN ORGANIZED HEALTH CARE EDUCATION/TRAINING PROGRAM

## 2024-09-05 PROCEDURE — 4010F ACE/ARB THERAPY RXD/TAKEN: CPT | Mod: HCNC,CPTII,S$GLB, | Performed by: STUDENT IN AN ORGANIZED HEALTH CARE EDUCATION/TRAINING PROGRAM

## 2024-09-05 PROCEDURE — 99214 OFFICE O/P EST MOD 30 MIN: CPT | Mod: HCNC,S$GLB,, | Performed by: STUDENT IN AN ORGANIZED HEALTH CARE EDUCATION/TRAINING PROGRAM

## 2024-09-05 PROCEDURE — 1157F ADVNC CARE PLAN IN RCRD: CPT | Mod: HCNC,CPTII,S$GLB, | Performed by: STUDENT IN AN ORGANIZED HEALTH CARE EDUCATION/TRAINING PROGRAM

## 2024-09-05 PROCEDURE — 3074F SYST BP LT 130 MM HG: CPT | Mod: HCNC,CPTII,S$GLB, | Performed by: STUDENT IN AN ORGANIZED HEALTH CARE EDUCATION/TRAINING PROGRAM

## 2024-09-05 PROCEDURE — 3066F NEPHROPATHY DOC TX: CPT | Mod: HCNC,CPTII,S$GLB, | Performed by: STUDENT IN AN ORGANIZED HEALTH CARE EDUCATION/TRAINING PROGRAM

## 2024-09-05 PROCEDURE — 1160F RVW MEDS BY RX/DR IN RCRD: CPT | Mod: HCNC,CPTII,S$GLB, | Performed by: STUDENT IN AN ORGANIZED HEALTH CARE EDUCATION/TRAINING PROGRAM

## 2024-09-05 PROCEDURE — 1125F AMNT PAIN NOTED PAIN PRSNT: CPT | Mod: HCNC,CPTII,S$GLB, | Performed by: STUDENT IN AN ORGANIZED HEALTH CARE EDUCATION/TRAINING PROGRAM

## 2024-09-05 PROCEDURE — 3008F BODY MASS INDEX DOCD: CPT | Mod: HCNC,CPTII,S$GLB, | Performed by: STUDENT IN AN ORGANIZED HEALTH CARE EDUCATION/TRAINING PROGRAM

## 2024-09-05 PROCEDURE — 99999 PR PBB SHADOW E&M-EST. PATIENT-LVL V: CPT | Mod: PBBFAC,HCNC,, | Performed by: STUDENT IN AN ORGANIZED HEALTH CARE EDUCATION/TRAINING PROGRAM

## 2024-09-05 PROCEDURE — 99999 PR PBB SHADOW E&M-EST. PATIENT-LVL V: CPT | Mod: PBBFAC,,, | Performed by: STUDENT IN AN ORGANIZED HEALTH CARE EDUCATION/TRAINING PROGRAM

## 2024-09-05 PROCEDURE — 1159F MED LIST DOCD IN RCRD: CPT | Mod: HCNC,CPTII,S$GLB, | Performed by: STUDENT IN AN ORGANIZED HEALTH CARE EDUCATION/TRAINING PROGRAM

## 2024-09-05 PROCEDURE — 3061F NEG MICROALBUMINURIA REV: CPT | Mod: HCNC,CPTII,S$GLB, | Performed by: STUDENT IN AN ORGANIZED HEALTH CARE EDUCATION/TRAINING PROGRAM

## 2024-09-05 PROCEDURE — 3078F DIAST BP <80 MM HG: CPT | Mod: HCNC,CPTII,S$GLB, | Performed by: STUDENT IN AN ORGANIZED HEALTH CARE EDUCATION/TRAINING PROGRAM

## 2024-09-05 RX ORDER — ALENDRONATE SODIUM 70 MG/1
70 TABLET ORAL
Qty: 4 TABLET | Refills: 11 | Status: SHIPPED | OUTPATIENT
Start: 2024-09-05 | End: 2025-09-05

## 2024-09-05 NOTE — TELEPHONE ENCOUNTER
----- Message from Dominique Maldonado sent at 9/5/2024  1:09 PM CDT -----  Regarding: [facility] request for surgery and to hold ASA  Good afternoon,     Dr Mireles has scheduled Vivienne Jose for a T12 and L1 Spinjack surgery on 9/23/24 at [facility] under general anesthesia. Dr Mireles is requesting a surgical clearance from her PCP and clearance for her to hold her ASA 5 days prior to this surgery.   I have notified the patient and also advised her to contact your office to make an appointment.     Thank you in advance   Dominique

## 2024-09-05 NOTE — PROGRESS NOTES
Chronic Pain - New Consult    Referring Physician: No ref. provider found    Date: 09/05/2024     Re: Vivienne Jose  MR#: [id number]  YOB: 1950  Age: 74 y.o.    Chief Complaint:   Chief Complaint   Patient presents with    Low-back Pain     **This note is dictated using the M*Modal Fluency Direct word recognition program. There are word recognition mistakes that are occasionally missed on review.**    ASSESSMENT: 74 y.o. year old female with lower back pain, consistent with     1. Osteoporotic compression fracture of vertebra, initial encounter  Ambulatory referral/consult to Orthopedics    alendronate (FOSAMAX) 70 MG tablet    Case Request Operating Room: T12 and L1 SpineJack ([employer])    CT Lumbar Spine Without Contrast      2. Hypercalcemia        3. Hyperkalemia  COMPREHENSIVE METABOLIC PANEL        PLAN:     Acute T12 and L1 osteoporotic compression fracture  -The patient started getting severe back pain radiating to the groin after she bent to  her mom in early August.  At that time she was found to have new compression fractures at T12 and L1.  I suspect that the inflammation from the compression fracture is causing nerve root irritation which is causing the radicular component that we are seeing in the T12 and L1 distribution.  -discussed with the patient that even if we do nothing at all I anticipate the inflammation to improve and that radicular component to improve as well.  -the back pain that she is getting with coughing, bending and lying down that is new since her accident is likely the result of the compression fracture.  The back pain she was having before the compression fracture is likely from her chronic arthritis.  -treatment were discussed at length.  Including conservative treatment with just medications.  Epidural injection to assist with inflammation pain.  And vertebral augmentation.  -We will schedule the patient for T12 and L1 SpineJack.  Risks,benefits, and  alternatives of the procedure were discussed with the patient and She would like to proceed with the procedure.  At this juncture, we believe that the patient's medical comorbidity status adds high risk and complexity to our proposed evaluation and treatment.  -CT lumbar to better assess the fracture  -patient has failed conservative treatment and is a reasonable candidate for vertebral augmentation    Osteoporosis   -start treatment with alendronate  -continue calditonin  -referral to [facility] for osteoporosis treatment    Lumbar spondylosis  -severe facet arthropathy at bilateral L4-5 and L5-S1  -I suspect this was responsible for her chronic axial low back pain prior to her accident.    - RTC 9/27/24 for suture removal  - Counseled patient regarding the importance of weight loss and activity modification and physical therapy.    The above plan and management options were discussed at length with patient. Patient is in agreement with the above and verbalized understanding. It will be communicated with the referring physician via electronic record, fax, or mail.  Lab/study reports reviewed were important and necessary because subsequent medical and treatment recommendations required review of the above lab/study reports. Images viewed/reviewed above were important and necessary because subsequent medical and treatment recommendations required review of the reviewed image(s).     Electronically signed by:  Myron Ocasio DO  09/05/2024    Patient was seen in clinic today.  The total amount of time spent on this patient was exactly 63 minutes.  Due to medical complexity, time spent with the patient, and multiple issues discussed/addressed I am billing for a level 5 visit. This includes face to face time and non-face to face time preparing to see the patient by reviewing previous labs/imaging, obtaining and/or reviewing separately obtained history, documenting clinical information in the electronic or  "other health record, independently reviewing results and communicating results to the patient/family/caregiver/additional providers.  The available imaging was reviewed in person with the patient, pathology and treatment options were explained in detail with the patient.  The patient was given multiple opportunities to ask questions, and all questions were answered.    =========================================================================================================    SUBJECTIVE:    Vivienne Jose is a 74 y.o. female presents to the clinic for the evaluation of lower mid back  pain. The pain started years ago following no inciting event and symptoms have been worsening.  The patient states that she was trying to  her mother in the beginning of August and she felt a "snap" and then went to the ED and was found to have acute compression fracture at T12 and L1.  The back pain has gotten much worse since it started.    Her pain is actually located predominantly in the low back.  She does not have too much pain in the upper low back.  The pain seems to start in the low back and will wrap around to the abdomen.  This has gotten worse since the compression fracture.      Pain Description:    The pain is located in the lower back area and radiates to the stomach .    At BEST  2/10   At WORST  10/10 on the WORST day.    On average pain is rated as 5/10.   Today the pain is rated as 5/10  The pain is continuous.  The pain is described as aching and shooting.    Symptoms interfere with daily activity and sleeping.   Exacerbating factors: Sitting, Standing, Laying, Bending, Lifting, and Getting out of bed/chair.    Mitigating factors medications.   She reports six hours of sleep per night.    Physical Therapy/Home Exercise: No, not currently in physical therapy or home exercise program    Current Pain Medications:    - calcitonin, tizanidine, gabapentin, duloxetine 20mg    Failed Pain Medications:    - prednisone, " cannot take NSAIDs    Pain Treatment Therapies:    Pain procedures: none  Physical Therapy: yes  Chiropractor: none  Acupuncture: none  TENS unit: none  Spinal decompression: none  Joint replacement: R TKA    Patient denies urinary incontinence and bowel incontinence and weakness  Patient denies any suicidal or homicidal ideations     report:  Reviewed and consistent with medication use as prescribed.    Imaging:   MRI Lumbar 09/2024:  FINDINGS:  Alignment: Grade 1 anterolisthesis of L4 on L5.     Vertebrae: Compression deformity of the T12 vertebral body with approximately 30% height loss and 5 mm of associated retropulsion.  Additional mild compression deformity of the L1 vertebral body with minimal retropulsion eccentric to the right.  Extensive marrow edema throughout the T12 vertebral body and to a lesser extent L1 vertebral body.  Mild edema of the prevertebral soft tissues.  Trace fluid in the T11-T12 disc space, likely post-traumatic.  L1 vertebral body hemangioma.     Discs: Mild disc height loss of T11-T12, T12-L1 and L4-L5. no evidence for discitis.     Cord: No cord signal abnormality or evidence of arachnoiditis.  Conus terminates at L1-L2.     Degenerative findings:     T11-T12: Fracture level with 5 mm of retropulsion of the superior endplate of T12 with mild spinal canal stenosis.     T12-L1: Fracture level with minimal retropulsion eccentric to the right resulting in mild spinal canal stenosis.     L1-L2: Circumferential disc bulge and mild facet arthropathy.  No spinal canal stenosis or neural foraminal narrowing.     L2-L3: Circumferential disc bulge with right paracentral protrusion and mild facet arthropathy.  No spinal canal stenosis or neural foraminal narrowing.     L3-L4: Mild facet arthropathy.  No spinal canal stenosis or neural foraminal narrowing.     L4-L5: Uncovering of the intervertebral disc with circumferential bulge and severe facet arthropathy result in mild spinal canal  stenosis with moderate effacement of the right lateral recess as well as moderate right, mild left neural foraminal narrowing.  Trace bilateral facet effusions noted.     L5-S1: Moderate facet arthropathy.  No spinal canal stenosis or neural foraminal narrowing.     Paraspinal muscles & soft tissues: Bilateral simple renal cysts.  Moderate atrophy of the paraspinal muscles.        9/5/2024    11:18 AM 6/9/2015     1:57 PM   Pain Disability Index (PDI)   Family/Home Responsibilities: 5 6   Recreation: 5 4   Occupation: 5 3   Sexual Behavior: 5 0   Self Care: 5 0   Life-Support Activities: 5 0   Pain Disability Index (PDI) 35 14        Past Medical History:   Diagnosis Date    Allergy     Angio-edema     Arthritis     Cataract     bilateral - not removed    Cerebrovascular malformation     Cirrhosis 11/24/2020    Colon polyps     Diabetes mellitus, type 2     Diabetic peripheral neuropathy     Edema of both feet 8/19/2022    GERD (gastroesophageal reflux disease)     Herpes infection     Hypothyroidism     Kidney stones     Mild nonproliferative diabetic retinopathy of 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 edema associated with type 2 diabetes mellitus 4/18/2019    DEL RIO (nonalcoholic steatohepatitis) 8/19/2022    Nausea & vomiting 11/23/2015    Obesity, morbid     Ovarian cyst     Seizure disorder, focal motor     Sleep apnea     Type II or unspecified type diabetes mellitus with neurological manifestations, uncontrolled(250.62)     Urticaria      Past Surgical History:   Procedure Laterality Date    CARPAL TUNNEL RELEASE Right 2014    CATARACT EXTRACTION W/  INTRAOCULAR LENS IMPLANT Right 9/19/2023    Procedure: EXTRACTION, CATARACT, WITH IOL INSERTION;  Surgeon: Lyndon Ortiz MD;  Location: [facility] OR;  Service: Ophthalmology;  Laterality: Right;    CATARACT EXTRACTION W/  INTRAOCULAR LENS IMPLANT Left 10/3/2023    Procedure: EXTRACTION, CATARACT, WITH IOL INSERTION;  Surgeon: Lyndon Ortiz MD;  Location: [facility]  OR;  Service: Ophthalmology;  Laterality: Left;    COLONOSCOPY      COLONOSCOPY N/A 9/13/2017    Procedure: COLONOSCOPY Golytely;  Surgeon: Gisela Wall MD;  Location: [facility];  Service: Endoscopy;  Laterality: N/A;    COLONOSCOPY N/A 9/9/2022    Procedure: COLONOSCOPY Extended Suprep;  Surgeon: Britt Jasso MD;  Location: [facility] ENDO;  Service: Endoscopy;  Laterality: N/A;    CYST REMOVAL      skin; multiples    ESOPHAGOGASTRODUODENOSCOPY Left 10/15/2021    Procedure: EGD (ESOPHAGOGASTRODUODENOSCOPY);  Surgeon: Luis Fernando Taveras MD;  Location: [facility] (4TH FLR);  Service: Endoscopy;  Laterality: Left;  cirrhosis labwork am of procedure  last seizure 1973  COVID test on 10/12/21 at [facility], [place]    ESOPHAGOGASTRODUODENOSCOPY N/A 3/10/2023    Procedure: EGD (ESOPHAGOGASTRODUODENOSCOPY);  Surgeon: Christa Caldera MD;  Location: [facility] (2ND FLR);  Service: Endoscopy;  Laterality: N/A;  Medically Urgent  cirrohsis-labs done on 2/9/23 (< 90 days)  New onset A-fib  3/1 instructions to portal-st  Precall complete- [place]    EXTRACORPOREAL SHOCK WAVE LITHOTRIPSY  2002    HYSTERECTOMY  1984    JOINT REPLACEMENT Right 03/06/2019    knee    KIDNEY STONE SURGERY      KNEE ARTHROPLASTY Right 3/6/2019    Procedure: ARTHROPLASTY, KNEE;  Surgeon: Pj Gamboa MD;  Location: [facility] OR;  Service: Orthopedics;  Laterality: Right;  Depuy (Stephen notified 2/21, CC)    THYROIDECTOMY  1977         TONSILLECTOMY, ADENOIDECTOMY      TRIGGER FINGER RELEASE      TUBAL LIGATION       Social History     Socioeconomic History    Marital status:    Occupational History    Occupation: retired     Employer: [employer]   Tobacco Use    Smoking status: Never     Passive exposure: Never    Smokeless tobacco: Never   Substance and Sexual Activity    Alcohol use: No     Alcohol/week: 0.0 standard drinks of alcohol    Drug use: No    Sexual activity: Yes     Partners: Male     Social Determinants of Health     Financial  Resource Strain: Low Risk  (8/9/2024)    Overall Financial Resource Strain (CARDIA)     Difficulty of Paying Living Expenses: Not hard at all   Food Insecurity: No Food Insecurity (8/9/2024)    Hunger Vital Sign     Worried About Running Out of Food in the Last Year: Never true     Ran Out of Food in the Last Year: Never true   Transportation Needs: No Transportation Needs (8/9/2024)    PRAPARE - Transportation     Lack of Transportation (Medical): No     Lack of Transportation (Non-Medical): No   Physical Activity: Inactive (1/17/2024)    Exercise Vital Sign     Days of Exercise per Week: 0 days     Minutes of Exercise per Session: 0 min   Stress: Stress Concern Present (8/9/2024)    [employer] - Occupational Stress Questionnaire     Feeling of Stress : To some extent   Housing Stability: Low Risk  (8/9/2024)    Housing Stability Vital Sign     Unable to Pay for Housing in the Last Year: No     Homeless in the Last Year: No     Family History   Problem Relation Name Age of Onset    Skin cancer Mother      Macular degeneration Mother      Dementia Mother      Hypertension Mother      Cancer Father      Arthritis Father      Gout Father      No Known Problems Daughter Madisyn     Diabetes Daughter Rudy     Hypertension Daughter Rudy     Arthritis Daughter Arianna     Allergies Son Lyndon     Allergic rhinitis Son Lyndon     Glaucoma Maternal Aunt          Great Maternal Aunt    Leukemia Maternal Uncle      Amblyopia Neg Hx      Cataracts Neg Hx      Retinal detachment Neg Hx      Strabismus Neg Hx      Stroke Neg Hx      Thyroid disease Neg Hx      Kidney disease Neg Hx      Angioedema Neg Hx      Asthma Neg Hx      Atopy Neg Hx      Eczema Neg Hx      Immunodeficiency Neg Hx      Rhinitis Neg Hx      Urticaria Neg Hx         Review of patient's allergies indicates:   Allergen Reactions    Sulfa (sulfonamide antibiotics) Nausea Only    Ciprofloxacin     Januvia [sitagliptin]       abd pain    Lisinopril     Lotensin [benazepril]     Nexium [esomeprazole magnesium] Other (See Comments)     Gas       Current Outpatient Medications   Medication Sig    ascorbic acid, vitamin C, (VITAMIN C) 100 MG tablet Take 500 mg by mouth 2 (two) times daily.     aspirin (ECOTRIN) 81 MG EC tablet Take 81 mg by mouth once daily.    betamethasone dipropionate (DIPROLENE) 0.05 % ointment Apply topically 2 (two) times daily. Use to affected areas for up to 2 weeks then take a 1 week break or decrease to 3 times weekly. Do not apply to groin or face. Use to spot on ear when flaring    blood sugar diagnostic (TRUE METRIX GLUCOSE TEST STRIP) Strp TEST TWO TIMES DAILY    blood-glucose meter Misc [employer] True Metrix Air meter    calcitonin, salmon, (FORTICAL) 200 unit/actuation nasal spray 1 spray by Nasal route once daily.    DULoxetine (CYMBALTA) 20 MG capsule Take 1 capsule (20 mg total) by mouth once daily.    estradioL (ESTRING) 2 mg (7.5 mcg /24 hour) vaginal ring Remove old Estring, place the new one every 3 months.    fluticasone propionate (FLONASE) 50 mcg/actuation nasal spray 2 sprays (100 mcg total) by Each Nostril route once daily.    furosemide (LASIX) 20 MG tablet Take 1 tablet (20 mg total) by mouth every other day.    gabapentin (NEURONTIN) 600 MG tablet Take 1 tablet (600 mg total) by mouth 2 (two) times daily.    glipiZIDE (GLUCOTROL) 5 MG tablet TAKE 1 TABLET TWICE DAILY BEFORE MEALS    HYDROcodone-acetaminophen (NORCO) 5-325 mg per tablet Take 1 tablet by mouth every 12 (twelve) hours as needed for Pain.    ipratropium (ATROVENT) 42 mcg (0.06 %) nasal spray 2 sprays by Each Nostril route 4 (four) times daily.    ketotifen (ZADITOR) 0.025 % (0.035 %) ophthalmic solution Place 1-2 drops into both eyes once daily.    L.acid-B.bifidum-B.animal-FOS 25 billion cell -100 mg Cap Take 1 capsule by mouth once daily.    lancets (TRUEPLUS LANCETS) 28 gauge [facility] [place]ject 1 lancet into the skin 2 (two) times daily  "before meals.    levothyroxine (SYNTHROID) 75 MCG tablet TAKE 1 TABLET EVERY DAY    loratadine (CLARITIN) 10 mg tablet Take 10 mg by mouth once daily.    lovastatin (MEVACOR) 40 MG tablet TAKE 1 TABLET NIGHTLY (SUBSTITUTED FOR MEVACOR)    metFORMIN (GLUCOPHAGE) 1000 MG tablet TAKE 1 TABLET TWICE DAILY WITH MEALS    mv-mn/folic acid/vit K/ozcl257 (ALIVE ONCE DAILY WOMEN 50 PLUS ORAL) Take 1 tablet by mouth once daily.    omeprazole (PRILOSEC) 20 MG capsule TAKE 1 CAPSULE BY MOUTH DAILY AS NEEDED (ACID REFLUX).    pen needle, diabetic (BD ULTRA-FINE EDUARDO PEN NEEDLE) 32 gauge x 5/32" Ndle USE AS DIRECTED    semaglutide (RYBELSUS) 14 mg tablet Take 1 tablet (14 mg total) by mouth once daily.    tiZANidine (ZANAFLEX) 4 MG tablet Take 1 tablet (4 mg total) by mouth every 8 (eight) hours as needed (muscle tension).    UNABLE TO FIND Take 1 tablet by mouth 2 (two) times a day. medication name: Magnesium 400mg, Calcium 1000 mg, D3 15mcg, Zinc 15mg    valACYclovir (VALTREX) 500 MG tablet Take 1 tablet (500 mg total) by mouth once daily.    valsartan (DIOVAN) 160 MG tablet Take 0.5 tablets (80 mg total) by mouth once daily.    alendronate (FOSAMAX) 70 MG tablet Take 1 tablet (70 mg total) by mouth every 7 days.    azelastine (ASTELIN) 137 mcg (0.1 %) nasal spray 2 sprays (274 mcg total) by Nasal route 2 (two) times daily.    nirmatrelvir-ritonavir (PAXLOVID) 300 mg (150 mg x 2)-100 mg copackaged tablets (EUA) Take 3 tablets by mouth 2 (two) times daily. Each dose contains 2 nirmatrelvir (pink tablets) and 1 ritonavir (white tablet). Take all 3 tablets together (Patient not taking: Reported on 9/5/2024)    spironolactone (ALDACTONE) 50 MG tablet Take 1 tablet (50 mg total) by mouth every other day. (Patient not taking: Reported on 9/5/2024)     Current Facility-Administered Medications   Medication    capsaicin-skin cleanser patch 4 patch       REVIEW OF SYSTEMS:    GENERAL:  No weight loss, malaise or fevers.  HEENT:   No " "recent changes in vision or hearing  NECK:  Negative for lumps, no difficulty with swallowing.  RESPIRATORY:  Negative for cough, wheezing or shortness of breath, patient denies any recent URI.  CARDIOVASCULAR:  Negative for chest pain, leg swelling or palpitations.  GI:  Negative for abdominal discomfort, blood in stools or black stools or change in bowel habits.  MUSCULOSKELETAL:  See HPI.  SKIN:  Negative for lesions, rash, and itching.  PSYCH:  No mood disorder or recent psychosocial stressors.  Patients sleep is not disturbed secondary to pain.  HEMATOLOGY/LYMPHOLOGY:  Negative for prolonged bleeding, bruising easily or swollen nodes.  Patient is not currently taking any anti-coagulants  NEURO:   No history of headaches, syncope, paralysis, seizures or tremors.  All other reviewed and negative other than HPI.    OBJECTIVE:    /65 (BP Location: Left arm, Patient Position: Sitting)   Pulse 70   Ht 5' 2" (1.575 m)   Wt 104.4 kg (230 lb 2.6 oz)   LMP  (LMP Unknown)   BMI 42.10 kg/m²     PHYSICAL EXAMINATION:    GENERAL: Well appearing, in no acute distress, alert and oriented x3.  PSYCH:  Mood and affect appropriate.  SKIN: Skin color, texture, turgor normal, no rashes or lesions.  HEAD/FACE:  Normocephalic, atraumatic. Cranial nerves grossly intact.  CV: RRR with palpation of the radial artery.  PULM: CTAB. No evidence of respiratory difficulty, symmetric chest rise.  GI:  Soft and non-tender. Obese    BACK: limited by mobility and pain  - No obvious deformity or signs of trauma, Normal lumbar lordotic curve  - Negative spinous process tenderness  - Positive paravertebral tenderness  - Positive pain to palpation over the facet joints of the lumbar spine.   - Positive QL / Iliac crest / Glut tenderness  - Slump test is Negative for radicular pain  - Slump test is Positive for back pain  - Supine Straight leg raising is Did not perform for radicular pain  - Supine Straight leg raising is Did not perform " for back pain  - Lumbar ROM is diminished in Flexion with pain  - Lumbar ROM is diminished in Extension with pain  - Lumbar ROM is diminished in Lateral Flexion with pain    - Did not perform Sustained Hip Flexion test (for discogenic pain)  - Positive Altered Gait, Posture  - Axial facet loading test Did not perform on the bilateral side(s)    SI Joint exam:  - Negative SI joint tenderness to palpation  - Seven's sign Did not perform  - Yeoman's Test: Did not perform for SI joint pain indicating anterior SI ligament involvement. Did not perform for anterior thigh pain/paresthesia which indicates femoral nerve stretch.  - Gaenslen's Test:Unable to Perform  - Finger Josephine's Sign:Negative  - SI compression test:Did not perform  - SI distraction test:Did not perform  - Thigh Thrust: Did not perform  - SI Thrust: Did not perform    MUSKULOSKELETAL:    EXTREMITIES:   Hip Exam:  - Log Roll Did not perform  - FADIR Did not perform  - Stinchfield Did not perform  - Hip Scour Did not perform  - GTB Tenderness Did not perform    NEUROLOGICAL EXAM:  MENTAL STATUS: A x O x 3, good concentration, speech is fluent and goal directed  MEMORY: recent and remote are intact  CN: CN2-12 grossly intact  MOTOR: 5/5 in all muscle groups of LE  DTRs: 0+ intact symmetric

## 2024-09-05 NOTE — ASSESSMENT & PLAN NOTE
she having a back procedure on sept 23rd with dr. Myron early. She is scheduled for T12 and L1 spinejack. This is an outpatient procedure  -pt has hx of HTN and diabetes which are very well controlled. She has sleep apnea and she is compliant with cpap  -pt denies chest pain, sob, dizziness or palpitations.   -she is unable to walk 2 blocks or climb stairs but due to back pain, not because she was getting sob  -denies hx of smoking or hx of copd or any lung disorders  -last surgery with general anesthesia was right knee replacement in march 2019, she did not have any complications post op or during the surgery. She had cataract surgery after that and no issues with that either.     Ascvd score is class II risk, 6% 30 day risk of death, MI or cardiac arrest  Martinez perioperative risk score is 0.2% 30 day risk fo MI or cardiac arrest     Proceed with procedure, pt is considered low risk for this procedure. She should tolerate it well.     Pt was instructed to not take insulin the night before. Hold semaglutide a week before as well as aspirin. Hold metformin the day of.

## 2024-09-05 NOTE — H&P (VIEW-ONLY)
Chronic Pain - New Consult    Referring Physician: No ref. provider found    Date: 09/05/2024     Re: Vivienne Jose  MR#: [id number]  YOB: 1950  Age: 74 y.o.    Chief Complaint:   Chief Complaint   Patient presents with    Low-back Pain     **This note is dictated using the M*Modal Fluency Direct word recognition program. There are word recognition mistakes that are occasionally missed on review.**    ASSESSMENT: 74 y.o. year old female with lower back pain, consistent with     1. Osteoporotic compression fracture of vertebra, initial encounter  Ambulatory referral/consult to Orthopedics    alendronate (FOSAMAX) 70 MG tablet    Case Request Operating Room: T12 and L1 SpineJack ([facility])    CT Lumbar Spine Without Contrast      2. Hypercalcemia        3. Hyperkalemia  COMPREHENSIVE METABOLIC PANEL        PLAN:     Acute T12 and L1 osteoporotic compression fracture  -The patient started getting severe back pain radiating to the groin after she bent to  her mom in early August.  At that time she was found to have new compression fractures at T12 and L1.  I suspect that the inflammation from the compression fracture is causing nerve root irritation which is causing the radicular component that we are seeing in the T12 and L1 distribution.  -discussed with the patient that even if we do nothing at all I anticipate the inflammation to improve and that radicular component to improve as well.  -the back pain that she is getting with coughing, bending and lying down that is new since her accident is likely the result of the compression fracture.  The back pain she was having before the compression fracture is likely from her chronic arthritis.  -treatment were discussed at length.  Including conservative treatment with just medications.  Epidural injection to assist with inflammation pain.  And vertebral augmentation.  -We will schedule the patient for T12 and L1 SpineJack.  Risks,benefits, and  alternatives of the procedure were discussed with the patient and She would like to proceed with the procedure.  At this juncture, we believe that the patient's medical comorbidity status adds high risk and complexity to our proposed evaluation and treatment.  -CT lumbar to better assess the fracture  -patient has failed conservative treatment and is a reasonable candidate for vertebral augmentation    Osteoporosis   -start treatment with alendronate  -continue calditonin  -referral to [facility] for osteoporosis treatment    Lumbar spondylosis  -severe facet arthropathy at bilateral L4-5 and L5-S1  -I suspect this was responsible for her chronic axial low back pain prior to her accident.    - RTC 9/27/24 for suture removal  - Counseled patient regarding the importance of weight loss and activity modification and physical therapy.    The above plan and management options were discussed at length with patient. Patient is in agreement with the above and verbalized understanding. It will be communicated with the referring physician via electronic record, fax, or mail.  Lab/study reports reviewed were important and necessary because subsequent medical and treatment recommendations required review of the above lab/study reports. Images viewed/reviewed above were important and necessary because subsequent medical and treatment recommendations required review of the reviewed image(s).     Electronically signed by:  Myron Ocasio DO  09/05/2024    Patient was seen in clinic today.  The total amount of time spent on this patient was exactly 63 minutes.  Due to medical complexity, time spent with the patient, and multiple issues discussed/addressed I am billing for a level 5 visit. This includes face to face time and non-face to face time preparing to see the patient by reviewing previous labs/imaging, obtaining and/or reviewing separately obtained history, documenting clinical information in the electronic or  "other health record, independently reviewing results and communicating results to the patient/family/caregiver/additional providers.  The available imaging was reviewed in person with the patient, pathology and treatment options were explained in detail with the patient.  The patient was given multiple opportunities to ask questions, and all questions were answered.    =========================================================================================================    SUBJECTIVE:    Vivienne Jose is a 74 y.o. female presents to the clinic for the evaluation of lower mid back  pain. The pain started years ago following no inciting event and symptoms have been worsening.  The patient states that she was trying to  her mother in the beginning of August and she felt a "snap" and then went to the ED and was found to have acute compression fracture at T12 and L1.  The back pain has gotten much worse since it started.    Her pain is actually located predominantly in the low back.  She does not have too much pain in the upper low back.  The pain seems to start in the low back and will wrap around to the abdomen.  This has gotten worse since the compression fracture.      Pain Description:    The pain is located in the lower back area and radiates to the stomach .    At BEST  2/10   At WORST  10/10 on the WORST day.    On average pain is rated as 5/10.   Today the pain is rated as 5/10  The pain is continuous.  The pain is described as aching and shooting.    Symptoms interfere with daily activity and sleeping.   Exacerbating factors: Sitting, Standing, Laying, Bending, Lifting, and Getting out of bed/chair.    Mitigating factors medications.   She reports six hours of sleep per night.    Physical Therapy/Home Exercise: No, not currently in physical therapy or home exercise program    Current Pain Medications:    - calcitonin, tizanidine, gabapentin, duloxetine 20mg    Failed Pain Medications:    - prednisone, " cannot take NSAIDs    Pain Treatment Therapies:    Pain procedures: none  Physical Therapy: yes  Chiropractor: none  Acupuncture: none  TENS unit: none  Spinal decompression: none  Joint replacement: R TKA    Patient denies urinary incontinence and bowel incontinence and weakness  Patient denies any suicidal or homicidal ideations     report:  Reviewed and consistent with medication use as prescribed.    Imaging:   MRI Lumbar 09/2024:  FINDINGS:  Alignment: Grade 1 anterolisthesis of L4 on L5.     Vertebrae: Compression deformity of the T12 vertebral body with approximately 30% height loss and 5 mm of associated retropulsion.  Additional mild compression deformity of the L1 vertebral body with minimal retropulsion eccentric to the right.  Extensive marrow edema throughout the T12 vertebral body and to a lesser extent L1 vertebral body.  Mild edema of the prevertebral soft tissues.  Trace fluid in the T11-T12 disc space, likely post-traumatic.  L1 vertebral body hemangioma.     Discs: Mild disc height loss of T11-T12, T12-L1 and L4-L5. no evidence for discitis.     Cord: No cord signal abnormality or evidence of arachnoiditis.  Conus terminates at L1-L2.     Degenerative findings:     T11-T12: Fracture level with 5 mm of retropulsion of the superior endplate of T12 with mild spinal canal stenosis.     T12-L1: Fracture level with minimal retropulsion eccentric to the right resulting in mild spinal canal stenosis.     L1-L2: Circumferential disc bulge and mild facet arthropathy.  No spinal canal stenosis or neural foraminal narrowing.     L2-L3: Circumferential disc bulge with right paracentral protrusion and mild facet arthropathy.  No spinal canal stenosis or neural foraminal narrowing.     L3-L4: Mild facet arthropathy.  No spinal canal stenosis or neural foraminal narrowing.     L4-L5: Uncovering of the intervertebral disc with circumferential bulge and severe facet arthropathy result in mild spinal canal  stenosis with moderate effacement of the right lateral recess as well as moderate right, mild left neural foraminal narrowing.  Trace bilateral facet effusions noted.     L5-S1: Moderate facet arthropathy.  No spinal canal stenosis or neural foraminal narrowing.     Paraspinal muscles & soft tissues: Bilateral simple renal cysts.  Moderate atrophy of the paraspinal muscles.        9/5/2024    11:18 AM 6/9/2015     1:57 PM   Pain Disability Index (PDI)   Family/Home Responsibilities: 5 6   Recreation: 5 4   Occupation: 5 3   Sexual Behavior: 5 0   Self Care: 5 0   Life-Support Activities: 5 0   Pain Disability Index (PDI) 35 14        Past Medical History:   Diagnosis Date    Allergy     Angio-edema     Arthritis     Cataract     bilateral - not removed    Cerebrovascular malformation     Cirrhosis 11/24/2020    Colon polyps     Diabetes mellitus, type 2     Diabetic peripheral neuropathy     Edema of both feet 8/19/2022    GERD (gastroesophageal reflux disease)     Herpes infection     Hypothyroidism     Kidney stones     Mild nonproliferative diabetic retinopathy of 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 edema associated with type 2 diabetes mellitus 4/18/2019    DEL RIO (nonalcoholic steatohepatitis) 8/19/2022    Nausea & vomiting 11/23/2015    Obesity, morbid     Ovarian cyst     Seizure disorder, focal motor     Sleep apnea     Type II or unspecified type diabetes mellitus with neurological manifestations, uncontrolled(250.62)     Urticaria      Past Surgical History:   Procedure Laterality Date    CARPAL TUNNEL RELEASE Right 2014    CATARACT EXTRACTION W/  INTRAOCULAR LENS IMPLANT Right 9/19/2023    Procedure: EXTRACTION, CATARACT, WITH IOL INSERTION;  Surgeon: Lyndon Ortiz MD;  Location: [facility] OR;  Service: Ophthalmology;  Laterality: Right;    CATARACT EXTRACTION W/  INTRAOCULAR LENS IMPLANT Left 10/3/2023    Procedure: EXTRACTION, CATARACT, WITH IOL INSERTION;  Surgeon: Lyndon Ortiz MD;  Location: [facility]  OR;  Service: Ophthalmology;  Laterality: Left;    COLONOSCOPY      COLONOSCOPY N/A 9/13/2017    Procedure: COLONOSCOPY Golytely;  Surgeon: Gisela Wall MD;  Location: [facility];  Service: Endoscopy;  Laterality: N/A;    COLONOSCOPY N/A 9/9/2022    Procedure: COLONOSCOPY Extended Suprep;  Surgeon: Britt Jasso MD;  Location: [facility] ENDO;  Service: Endoscopy;  Laterality: N/A;    CYST REMOVAL      skin; multiples    ESOPHAGOGASTRODUODENOSCOPY Left 10/15/2021    Procedure: EGD (ESOPHAGOGASTRODUODENOSCOPY);  Surgeon: Luis Fernando Taveras MD;  Location: [facility] (4TH FLR);  Service: Endoscopy;  Laterality: Left;  cirrhosis labwork am of procedure  last seizure 1973  COVID [facility] 10/12/21 [facility]    ESOPHAGOGASTRODUODENOSCOPY N/A 3/10/2023    Procedure: EGD (ESOPHAGOGASTRODUODENOSCOPY);  Surgeon: Christa Caldera MD;  Location: [facility] (2ND FLR);  Service: Endoscopy;  Laterality: N/A;  Medically Urgent  cirrohsis-labs done on 2/9/23 (< 90 days)  New onset A-fib  3/1 instructions to portal-st  Precall complete- [place]    EXTRACORPOREAL SHOCK WAVE LITHOTRIPSY  2002    HYSTERECTOMY  1984    JOINT REPLACEMENT Right 03/06/2019    knee    KIDNEY STONE SURGERY      KNEE ARTHROPLASTY Right 3/6/2019    Procedure: ARTHROPLASTY, KNEE;  Surgeon: Pj Gamboa MD;  Location: [facility] OR;  Service: Orthopedics;  Laterality: Right;  Depuy (Stephen notified 2/21, CC)    THYROIDECTOMY  1977         TONSILLECTOMY, ADENOIDECTOMY      TRIGGER FINGER RELEASE      TUBAL LIGATION       Social History     Socioeconomic History    Marital status:    Occupational History    Occupation: retired     Employer: [employer]   Tobacco Use    Smoking status: Never     Passive exposure: Never    Smokeless tobacco: Never   Substance and Sexual Activity    Alcohol use: No     Alcohol/week: 0.0 standard drinks of alcohol    Drug use: No    Sexual activity: Yes     Partners: Male     Social Determinants of Health     Financial  Resource Strain: Low Risk  (8/9/2024)    Overall Financial Resource Strain (CARDIA)     Difficulty of Paying Living Expenses: Not hard at all   Food Insecurity: No Food Insecurity (8/9/2024)    Hunger Vital Sign     Worried About Running Out of Food in the Last Year: Never true     Ran Out of Food in the Last Year: Never true   Transportation Needs: No Transportation Needs (8/9/2024)    PRAPARE - Transportation     Lack of Transportation (Medical): No     Lack of Transportation (Non-Medical): No   Physical Activity: Inactive (1/17/2024)    Exercise Vital Sign     Days of Exercise per Week: 0 days     Minutes of Exercise per Session: 0 min   Stress: Stress Concern Present (8/9/2024)    [employer] - Occupational Stress Questionnaire     Feeling of Stress : To some extent   Housing Stability: Low Risk  (8/9/2024)    Housing Stability Vital Sign     Unable to Pay for Housing in the Last Year: No     Homeless in the Last Year: No     Family History   Problem Relation Name Age of Onset    Skin cancer Mother      Macular degeneration Mother      Dementia Mother      Hypertension Mother      Cancer Father      Arthritis Father      Gout Father      No Known Problems Daughter Madisyn     Diabetes Daughter Rudy     Hypertension Daughter Rudy     Arthritis Daughter Arianna     Allergies Son Lyndon     Allergic rhinitis Son Lyndon     Glaucoma Maternal Aunt          Great Maternal Aunt    Leukemia Maternal Uncle      Amblyopia Neg Hx      Cataracts Neg Hx      Retinal detachment Neg Hx      Strabismus Neg Hx      Stroke Neg Hx      Thyroid disease Neg Hx      Kidney disease Neg Hx      Angioedema Neg Hx      Asthma Neg Hx      Atopy Neg Hx      Eczema Neg Hx      Immunodeficiency Neg Hx      Rhinitis Neg Hx      Urticaria Neg Hx         Review of patient's allergies indicates:   Allergen Reactions    Sulfa (sulfonamide antibiotics) Nausea Only    Ciprofloxacin     Januvia [sitagliptin]       abd pain    Lisinopril     Lotensin [benazepril]     Nexium [esomeprazole magnesium] Other (See Comments)     Gas       Current Outpatient Medications   Medication Sig    ascorbic acid, vitamin C, (VITAMIN C) 100 MG tablet Take 500 mg by mouth 2 (two) times daily.     aspirin (ECOTRIN) 81 MG EC tablet Take 81 mg by mouth once daily.    betamethasone dipropionate (DIPROLENE) 0.05 % ointment Apply topically 2 (two) times daily. Use to affected areas for up to 2 weeks then take a 1 week break or decrease to 3 times weekly. Do not apply to groin or face. Use to spot on ear when flaring    blood sugar diagnostic (TRUE METRIX GLUCOSE TEST STRIP) Strp TEST TWO TIMES DAILY    blood-glucose meter Misc [employer] True Metrix Air meter    calcitonin, salmon, (FORTICAL) 200 unit/actuation nasal spray 1 spray by Nasal route once daily.    DULoxetine (CYMBALTA) 20 MG capsule Take 1 capsule (20 mg total) by mouth once daily.    estradioL (ESTRING) 2 mg (7.5 mcg /24 hour) vaginal ring Remove old Estring, place the new one every 3 months.    fluticasone propionate (FLONASE) 50 mcg/actuation nasal spray 2 sprays (100 mcg total) by Each Nostril route once daily.    furosemide (LASIX) 20 MG tablet Take 1 tablet (20 mg total) by mouth every other day.    gabapentin (NEURONTIN) 600 MG tablet Take 1 tablet (600 mg total) by mouth 2 (two) times daily.    glipiZIDE (GLUCOTROL) 5 MG tablet TAKE 1 TABLET TWICE DAILY BEFORE MEALS    HYDROcodone-acetaminophen (NORCO) 5-325 mg per tablet Take 1 tablet by mouth every 12 (twelve) hours as needed for Pain.    ipratropium (ATROVENT) 42 mcg (0.06 %) nasal spray 2 sprays by Each Nostril route 4 (four) times daily.    ketotifen (ZADITOR) 0.025 % (0.035 %) ophthalmic solution Place 1-2 drops into both eyes once daily.    L.acid-B.bifidum-B.animal-FOS 25 billion cell -100 mg Cap Take 1 capsule by mouth once daily.    lancets (TRUEPLUS LANCETS) 28 gauge [facility] Inject 1 lancet into the skin 2 (two) times daily  "before meals.    levothyroxine (SYNTHROID) 75 MCG tablet TAKE 1 TABLET EVERY DAY    loratadine (CLARITIN) 10 mg tablet Take 10 mg by mouth once daily.    lovastatin (MEVACOR) 40 MG tablet TAKE 1 TABLET NIGHTLY (SUBSTITUTED FOR MEVACOR)    metFORMIN (GLUCOPHAGE) 1000 MG tablet TAKE 1 TABLET TWICE DAILY WITH MEALS    mv-mn/folic acid/vit [id number] (ALIVE ONCE DAILY WOMEN 50 PLUS ORAL) Take 1 tablet by mouth once daily.    omeprazole (PRILOSEC) 20 MG capsule TAKE 1 CAPSULE BY MOUTH DAILY AS NEEDED (ACID REFLUX).    pen needle, diabetic (BD ULTRA-FINE EDUARDO PEN NEEDLE) 32 gauge x 5/32" Ndle USE AS DIRECTED    semaglutide (RYBELSUS) 14 mg tablet Take 1 tablet (14 mg total) by mouth once daily.    tiZANidine (ZANAFLEX) 4 MG tablet Take 1 tablet (4 mg total) by mouth every 8 (eight) hours as needed (muscle tension).    UNABLE TO FIND Take 1 tablet by mouth 2 (two) times a day. medication name: Magnesium 400mg, Calcium 1000 mg, D3 15mcg, Zinc 15mg    valACYclovir (VALTREX) 500 MG tablet Take 1 tablet (500 mg total) by mouth once daily.    valsartan (DIOVAN) 160 MG tablet Take 0.5 tablets (80 mg total) by mouth once daily.    alendronate (FOSAMAX) 70 MG tablet Take 1 tablet (70 mg total) by mouth every 7 days.    azelastine (ASTELIN) 137 mcg (0.1 %) nasal spray 2 sprays (274 mcg total) by Nasal route 2 (two) times daily.    nirmatrelvir-ritonavir (PAXLOVID) 300 mg (150 mg x 2)-100 mg copackaged tablets (EUA) Take 3 tablets by mouth 2 (two) times daily. Each dose contains 2 nirmatrelvir (pink tablets) and 1 ritonavir (white tablet). Take all 3 tablets together (Patient not taking: Reported on 9/5/2024)    spironolactone (ALDACTONE) 50 MG tablet Take 1 tablet (50 mg total) by mouth every other day. (Patient not taking: Reported on 9/5/2024)     Current Facility-Administered Medications   Medication    capsaicin-skin cleanser patch 4 patch       REVIEW OF SYSTEMS:    GENERAL:  No weight loss, malaise or fevers.  HEENT:   No " "recent changes in vision or hearing  NECK:  Negative for lumps, no difficulty with swallowing.  RESPIRATORY:  Negative for cough, wheezing or shortness of breath, patient denies any recent URI.  CARDIOVASCULAR:  Negative for chest pain, leg swelling or palpitations.  GI:  Negative for abdominal discomfort, blood in stools or black stools or change in bowel habits.  MUSCULOSKELETAL:  See HPI.  SKIN:  Negative for lesions, rash, and itching.  PSYCH:  No mood disorder or recent psychosocial stressors.  Patients sleep is not disturbed secondary to pain.  HEMATOLOGY/LYMPHOLOGY:  Negative for prolonged bleeding, bruising easily or swollen nodes.  Patient is not currently taking any anti-coagulants  NEURO:   No history of headaches, syncope, paralysis, seizures or tremors.  All other reviewed and negative other than HPI.    OBJECTIVE:    /65 (BP Location: Left arm, Patient Position: Sitting)   Pulse 70   Ht 5' 2" (1.575 m)   Wt 104.4 kg (230 lb 2.6 oz)   LMP  (LMP Unknown)   BMI 42.10 kg/m²     PHYSICAL EXAMINATION:    GENERAL: Well appearing, in no acute distress, alert and oriented x3.  PSYCH:  Mood and affect appropriate.  SKIN: Skin color, texture, turgor normal, no rashes or lesions.  HEAD/FACE:  Normocephalic, atraumatic. Cranial nerves grossly intact.  CV: RRR with palpation of the radial artery.  PULM: CTAB. No evidence of respiratory difficulty, symmetric chest rise.  GI:  Soft and non-tender. Obese    BACK: limited by mobility and pain  - No obvious deformity or signs of trauma, Normal lumbar lordotic curve  - Negative spinous process tenderness  - Positive paravertebral tenderness  - Positive pain to palpation over the facet joints of the lumbar spine.   - Positive QL / Iliac crest / Glut tenderness  - Slump test is Negative for radicular pain  - Slump test is Positive for back pain  - Supine Straight leg raising is Did not perform for radicular pain  - Supine Straight leg raising is Did not perform " for back pain  - Lumbar ROM is diminished in Flexion with pain  - Lumbar ROM is diminished in Extension with pain  - Lumbar ROM is diminished in Lateral Flexion with pain    - Did not perform Sustained Hip Flexion test (for discogenic pain)  - Positive Altered Gait, Posture  - Axial facet loading test Did not perform on the bilateral side(s)    SI Joint exam:  - Negative SI joint tenderness to palpation  - Seven's sign Did not perform  - Yeoman's Test: Did not perform for SI joint pain indicating anterior SI ligament involvement. Did not perform for anterior thigh pain/paresthesia which indicates femoral nerve stretch.  - Gaenslen's Test:Unable to Perform  - Finger Josephine's Sign:Negative  - SI compression test:Did not perform  - SI distraction test:Did not perform  - Thigh Thrust: Did not perform  - SI Thrust: Did not perform    MUSKULOSKELETAL:    EXTREMITIES:   Hip Exam:  - Log Roll Did not perform  - FADIR Did not perform  - Stinchfield Did not perform  - Hip Scour Did not perform  - GTB Tenderness Did not perform    NEUROLOGICAL EXAM:  MENTAL STATUS: A x O x 3, good concentration, speech is fluent and goal directed  MEMORY: recent and remote are intact  CN: CN2-12 grossly intact  MOTOR: 5/5 in all muscle groups of LE  DTRs: 0+ intact symmetric

## 2024-09-05 NOTE — PROGRESS NOTES
Clinic Note  9/5/2024      Subjective:       Patient ID:  Vivienne is a 74 y.o. female being seen for an established visit.    Chief Complaint: Pre-op Exam    HPI  Vivienne Jose is a 74 y.o.  female who presents with HTN, type 2 DM, HLD, DEL RIO, hypothyroidism (s/p thyroidectomy due to cysts), nephrolithiasis, recurrent UTIs (sees dr. Cee), sleep apnea, neuropathy is here for a preop visit  -potassium has been elevated, initially at 5.9 and then I asked her to take extra lasix and hold spironolactone which she did. Repeat potassium was 5.7. she has a cmp scheduled for Monday. She is drinking more water, still holding spironolactone and we will see if its better on Monday  -she having a back procedure on sept 23rd with dr. Myron early. She is scheduled for T12 and L1 spinejack. This is an outpatient procedure  -pt has hx of HTN and diabetes which are very well controlled. She has sleep apnea and she is compliant with cpap. Not sure what kind of anesthesia she will be receiving.   -pt denies chest pain, sob, dizziness or palpitations.   -she is unable to walk 2 blocks or climb stairs but due to back pain, not because she was getting sob  -denies hx of smoking or hx of copd or any lung disorders  -her renal function has been declining over last 6 months. She held spironolactone and valsartan today, asked her to continue to hold since her BP is on the low side without meds.     Review of Systems   Constitutional:  Negative for chills and fever.   HENT:  Negative for congestion.    Respiratory:  Negative for cough and shortness of breath.    Cardiovascular:  Negative for chest pain and palpitations.   Gastrointestinal:  Negative for abdominal pain, blood in stool, constipation and diarrhea.   Genitourinary:  Negative for dysuria and hematuria.   Neurological:  Negative for dizziness and headaches.       Medication List with Changes/Refills   Current Medications    ALENDRONATE (FOSAMAX) 70 MG TABLET     Take 1 tablet (70 mg total) by mouth every 7 days.    ASCORBIC ACID, VITAMIN C, (VITAMIN C) 100 MG TABLET    Take 500 mg by mouth 2 (two) times daily.     ASPIRIN (ECOTRIN) 81 MG EC TABLET    Take 81 mg by mouth once daily.    AZELASTINE (ASTELIN) 137 MCG (0.1 %) NASAL SPRAY    2 sprays (274 mcg total) by Nasal route 2 (two) times daily.    BETAMETHASONE DIPROPIONATE (DIPROLENE) 0.05 % OINTMENT    Apply topically 2 (two) times daily. Use to affected areas for up to 2 weeks then take a 1 week break or decrease to 3 times weekly. Do not apply to groin or face. Use to spot on ear when flaring    BLOOD SUGAR DIAGNOSTIC (TRUE METRIX GLUCOSE TEST STRIP) STRP    TEST TWO TIMES DAILY    BLOOD-GLUCOSE METER MISC    Human True Metrix Air meter    CALCITONIN, SALMON, (FORTICAL) 200 UNIT/ACTUATION NASAL SPRAY    1 spray by Nasal route once daily.    DULOXETINE (CYMBALTA) 20 MG CAPSULE    Take 1 capsule (20 mg total) by mouth once daily.    ESTRADIOL (ESTRING) 2 MG (7.5 MCG /24 HOUR) VAGINAL RING    Remove old Estring, place the new one every 3 months.    FLUTICASONE PROPIONATE (FLONASE) 50 MCG/ACTUATION NASAL SPRAY    2 sprays (100 mcg total) by Each Nostril route once daily.    FUROSEMIDE (LASIX) 20 MG TABLET    Take 1 tablet (20 mg total) by mouth every other day.    GABAPENTIN (NEURONTIN) 600 MG TABLET    Take 1 tablet (600 mg total) by mouth 2 (two) times daily.    GLIPIZIDE (GLUCOTROL) 5 MG TABLET    TAKE 1 TABLET TWICE DAILY BEFORE MEALS    HYDROCODONE-ACETAMINOPHEN (NORCO) 5-325 MG PER TABLET    Take 1 tablet by mouth every 12 (twelve) hours as needed for Pain.    IPRATROPIUM (ATROVENT) 42 MCG (0.06 %) NASAL SPRAY    2 sprays by Each Nostril route 4 (four) times daily.    KETOTIFEN (ZADITOR) 0.025 % (0.035 %) OPHTHALMIC SOLUTION    Place 1-2 drops into both eyes once daily.    L.ACID-B.BIFIDUM-B.ANIMAL-FOS 25 BILLION CELL -100 MG CAP    Take 1 capsule by mouth once daily.    LANCETS (TRUEPLUS LANCETS) 28 GAUGE MISC     "Inject 1 lancet into the skin 2 (two) times daily before meals.    LEVOTHYROXINE (SYNTHROID) 75 MCG TABLET    TAKE 1 TABLET EVERY DAY    LORATADINE (CLARITIN) 10 MG TABLET    Take 10 mg by mouth once daily.    LOVASTATIN (MEVACOR) 40 MG TABLET    TAKE 1 TABLET NIGHTLY (SUBSTITUTED FOR MEVACOR)    METFORMIN (GLUCOPHAGE) 1000 MG TABLET    TAKE 1 TABLET TWICE DAILY WITH MEALS    MV-MN/FOLIC ACID/VIT [id number] (ALIVE ONCE DAILY WOMEN 50 PLUS ORAL)    Take 1 tablet by mouth once daily.    NIRMATRELVIR-RITONAVIR (PAXLOVID) 300 MG (150 MG X 2)-100 MG COPACKAGED TABLETS (EUA)    Take 3 tablets by mouth 2 (two) times daily. Each dose contains 2 nirmatrelvir (pink tablets) and 1 ritonavir (white tablet). Take all 3 tablets together    OMEPRAZOLE (PRILOSEC) 20 MG CAPSULE    TAKE 1 CAPSULE BY MOUTH DAILY AS NEEDED (ACID REFLUX).    PEN NEEDLE, DIABETIC (BD ULTRA-FINE EDUARDO PEN NEEDLE) 32 GAUGE X 5/32" NDLE    USE AS DIRECTED    SEMAGLUTIDE (RYBELSUS) 14 MG TABLET    Take 1 tablet (14 mg total) by mouth once daily.    SPIRONOLACTONE (ALDACTONE) 50 MG TABLET    Take 1 tablet (50 mg total) by mouth every other day.    TIZANIDINE (ZANAFLEX) 4 MG TABLET    Take 1 tablet (4 mg total) by mouth every 8 (eight) hours as needed (muscle tension).    UNABLE TO FIND    Take 1 tablet by mouth 2 (two) times a day. medication name: Magnesium 400mg, Calcium 1000 mg, D3 15mcg, Zinc 15mg    VALACYCLOVIR (VALTREX) 500 MG TABLET    Take 1 tablet (500 mg total) by mouth once daily.    VALSARTAN (DIOVAN) 160 MG TABLET    Take 0.5 tablets (80 mg total) by mouth once daily.           Objective:      /64 (BP Location: Left arm, Patient Position: Sitting, BP Method: Medium (Manual))   Pulse 78   Resp 16   Ht 5' 2" (1.575 m)   Wt 105.3 kg (232 lb 2.3 oz)   LMP  (LMP Unknown)   SpO2 96%   BMI 42.46 kg/m²   Estimated body mass index is 42.46 kg/m² as calculated from the following:    Height as of this encounter: 5' 2" (1.575 m).    Weight as of " this encounter: 105.3 kg (232 lb 2.3 oz).  Physical Exam  Constitutional:       Appearance: Normal appearance.   Eyes:      Conjunctiva/sclera: Conjunctivae normal.   Cardiovascular:      Rate and Rhythm: Normal rate and regular rhythm.      Heart sounds: Normal heart sounds.   Pulmonary:      Effort: Pulmonary effort is normal. No respiratory distress.      Breath sounds: Normal breath sounds.   Musculoskeletal:      Right lower leg: No edema.      Left lower leg: No edema.   Skin:     General: Skin is warm.   Neurological:      Mental Status: She is alert and oriented to person, place, and time.   Psychiatric:         Mood and Affect: Mood normal.         Behavior: Behavior normal.           Assessment and Plan:     1. Preoperative clearance  Assessment & Plan:  she having a back procedure on sept 23rd with dr. Myron early. She is scheduled for T12 and L1 spinejack. This is an outpatient procedure  -pt has hx of HTN and diabetes which are very well controlled. She has sleep apnea and she is compliant with cpap  -pt denies chest pain, sob, dizziness or palpitations.   -she is unable to walk 2 blocks or climb stairs but due to back pain, not because she was getting sob  -denies hx of smoking or hx of copd or any lung disorders  -last surgery with general anesthesia was right knee replacement in march 2019, she did not have any complications post op or during the surgery. She had cataract surgery after that and no issues with that either.     Ascvd score is class II risk, 6% 30 day risk of death, MI or cardiac arrest  Martinez perioperative risk score is 0.2% 30 day risk fo MI or cardiac arrest     Proceed with procedure, pt is considered low risk for this procedure. She should tolerate it well.     Pt was instructed to not take insulin the night before. Hold semaglutide a week before as well as aspirin. Hold metformin the day of.             I spent a total of 30 minutes on the day of the visit.  This includes  face to face time and non-face to face time preparing to see the patient (eg, review of tests), obtaining and/or reviewing separately obtained history, documenting clinical information in the electronic or other health record, independently interpreting results and communicating results to the patient/family/caregiver, or care coordinator.     Follow Up:   No follow-ups on file.        Nalini Hall

## 2024-09-05 NOTE — TELEPHONE ENCOUNTER
----- Message from Dominique Maldonado sent at 9/5/2024  1:09 PM CDT -----  Regarding: [facility] request for surgery and to hold ASA  Good afternoon,     Dr Mireles has scheduled Vivienne Jose for a T12 and L1 Spinjack surgery on 9/23/24 at [place] under general anesthesia. Dr Mireles is requesting a surgical clearance from her PCP and clearance for her to hold her ASA 5 days prior to this surgery.   I have notified the patient and also advised her to contact your office to make an appointment.     Thank you in advance   Dominique

## 2024-09-09 ENCOUNTER — LAB VISIT (OUTPATIENT)
Dept: LAB | Facility: HOSPITAL | Age: 74
End: 2024-09-09
Attending: STUDENT IN AN ORGANIZED HEALTH CARE EDUCATION/TRAINING PROGRAM
Payer: MEDICARE

## 2024-09-09 DIAGNOSIS — E87.5 HYPERKALEMIA: ICD-10-CM

## 2024-09-09 LAB
ALBUMIN SERPL BCP-MCNC: 3.4 G/DL (ref 3.5–5.2)
ALP SERPL-CCNC: 118 U/L (ref 55–135)
ALT SERPL W/O P-5'-P-CCNC: 23 U/L (ref 10–44)
ANION GAP SERPL CALC-SCNC: 11 MMOL/L (ref 8–16)
AST SERPL-CCNC: 30 U/L (ref 10–40)
BILIRUB SERPL-MCNC: 0.3 MG/DL (ref 0.1–1)
BUN SERPL-MCNC: 24 MG/DL (ref 8–23)
CALCIUM SERPL-MCNC: 9.7 MG/DL (ref 8.7–10.5)
CHLORIDE SERPL-SCNC: 102 MMOL/L (ref 95–110)
CO2 SERPL-SCNC: 25 MMOL/L (ref 23–29)
CREAT SERPL-MCNC: 1.2 MG/DL (ref 0.5–1.4)
EST. GFR  (NO RACE VARIABLE): 47.8 ML/MIN/1.73 M^2
GLUCOSE SERPL-MCNC: 99 MG/DL (ref 70–110)
POTASSIUM SERPL-SCNC: 4.3 MMOL/L (ref 3.5–5.1)
PROT SERPL-MCNC: 7.7 G/DL (ref 6–8.4)
SODIUM SERPL-SCNC: 138 MMOL/L (ref 136–145)

## 2024-09-09 PROCEDURE — 80053 COMPREHEN METABOLIC PANEL: CPT | Mod: HCNC | Performed by: STUDENT IN AN ORGANIZED HEALTH CARE EDUCATION/TRAINING PROGRAM

## 2024-09-09 PROCEDURE — 36415 COLL VENOUS BLD VENIPUNCTURE: CPT | Performed by: STUDENT IN AN ORGANIZED HEALTH CARE EDUCATION/TRAINING PROGRAM

## 2024-09-10 ENCOUNTER — LAB VISIT (OUTPATIENT)
Dept: LAB | Facility: HOSPITAL | Age: 74
End: 2024-09-10
Payer: MEDICARE

## 2024-09-10 ENCOUNTER — PATIENT MESSAGE (OUTPATIENT)
Dept: ALLERGY | Facility: CLINIC | Age: 74
End: 2024-09-10
Payer: MEDICARE

## 2024-09-10 ENCOUNTER — HOSPITAL ENCOUNTER (OUTPATIENT)
Dept: RADIOLOGY | Facility: HOSPITAL | Age: 74
Discharge: HOME | End: 2024-09-10
Attending: STUDENT IN AN ORGANIZED HEALTH CARE EDUCATION/TRAINING PROGRAM
Payer: MEDICARE

## 2024-09-10 ENCOUNTER — OFFICE VISIT (OUTPATIENT)
Dept: ORTHOPEDICS | Facility: CLINIC | Age: 74
End: 2024-09-10
Payer: MEDICARE

## 2024-09-10 ENCOUNTER — DOCUMENTATION ONLY (OUTPATIENT)
Dept: PAIN MEDICINE | Facility: CLINIC | Age: 74
End: 2024-09-10
Payer: MEDICARE

## 2024-09-10 DIAGNOSIS — M80.80XA PATHOLOGICAL FRACTURE DUE TO OSTEOPOROSIS, UNSPECIFIED FRACTURE SITE, UNSPECIFIED OSTEOPOROSIS TYPE, INITIAL ENCOUNTER: ICD-10-CM

## 2024-09-10 DIAGNOSIS — S22.080S COMPRESSION FRACTURE OF T12 VERTEBRA, SEQUELA: Primary | ICD-10-CM

## 2024-09-10 DIAGNOSIS — M81.6 LOCALIZED OSTEOPOROSIS OF LEQUESNE: ICD-10-CM

## 2024-09-10 DIAGNOSIS — M80.88XA OSTEOPOROTIC COMPRESSION FRACTURE OF VERTEBRA, INITIAL ENCOUNTER: ICD-10-CM

## 2024-09-10 DIAGNOSIS — M81.0 OSTEOPOROSIS, UNSPECIFIED OSTEOPOROSIS TYPE, UNSPECIFIED PATHOLOGICAL FRACTURE PRESENCE: ICD-10-CM

## 2024-09-10 LAB
25(OH)D3+25(OH)D2 SERPL-MCNC: 99 NG/ML (ref 30–96)
PTH-INTACT SERPL-MCNC: 33.1 PG/ML (ref 9–77)
T4 FREE SERPL-MCNC: 1.12 NG/DL (ref 0.71–1.51)
TSH SERPL DL<=0.005 MIU/L-ACNC: 1.93 UIU/ML (ref 0.4–4)

## 2024-09-10 PROCEDURE — 72131 CT LUMBAR SPINE W/O DYE: CPT | Mod: 26,,, | Performed by: RADIOLOGY

## 2024-09-10 PROCEDURE — 72131 CT LUMBAR SPINE W/O DYE: CPT | Mod: TC

## 2024-09-10 PROCEDURE — 82306 VITAMIN D 25 HYDROXY: CPT | Mod: HCNC | Performed by: PHYSICIAN ASSISTANT

## 2024-09-10 PROCEDURE — 84439 ASSAY OF FREE THYROXINE: CPT | Mod: HCNC | Performed by: PHYSICIAN ASSISTANT

## 2024-09-10 PROCEDURE — 99999 PR PBB SHADOW E&M-EST. PATIENT-LVL III: CPT | Mod: PBBFAC,HCNC,, | Performed by: PHYSICIAN ASSISTANT

## 2024-09-10 PROCEDURE — 84075 ASSAY ALKALINE PHOSPHATASE: CPT | Mod: HCNC | Performed by: PHYSICIAN ASSISTANT

## 2024-09-10 PROCEDURE — 83970 ASSAY OF PARATHORMONE: CPT | Mod: HCNC | Performed by: PHYSICIAN ASSISTANT

## 2024-09-10 PROCEDURE — 84443 ASSAY THYROID STIM HORMONE: CPT | Mod: HCNC | Performed by: PHYSICIAN ASSISTANT

## 2024-09-10 NOTE — PROGRESS NOTES
SUBJECTIVE:     Chief Complaint & History of Present Illness:  Vivienne Jose is a new patient 74 y.o. female who is seen here today for a bone health evaluation for osteoporosis, fracture prevention, and risk evaluation for future fractures.   she is accompanied by   today who is helpful with her history.     she was appropriately identified and referred by Myron Ocasio,* due to concerns for compromised bone quality, and risk of future fractures.  This visit is medically necessary to identify risk, investigate and initiative treatment as appropriate to improve bone quality and strength for the reduction of secondary fractures.     her medical history, medications and fracture history will be reviewed and summarized      Review of patient's allergies indicates:   Allergen Reactions    Sulfa (sulfonamide antibiotics) Nausea Only    Ciprofloxacin     Januvia [sitagliptin]      abd pain    Lisinopril     Lotensin [benazepril]     Nexium [esomeprazole magnesium] Other (See Comments)     Gas         Current Outpatient Medications   Medication Sig Dispense Refill    alendronate (FOSAMAX) 70 MG tablet Take 1 tablet (70 mg total) by mouth every 7 days. 4 tablet 11    ascorbic acid, vitamin C, (VITAMIN C) 100 MG tablet Take 500 mg by mouth 2 (two) times daily.       aspirin (ECOTRIN) 81 MG EC tablet Take 81 mg by mouth once daily.      azelastine (ASTELIN) 137 mcg (0.1 %) nasal spray 2 sprays (274 mcg total) by Nasal route 2 (two) times daily. 120 mL 4    betamethasone dipropionate (DIPROLENE) 0.05 % ointment Apply topically 2 (two) times daily. Use to affected areas for up to 2 weeks then take a 1 week break or decrease to 3 times weekly. Do not apply to groin or face. Use to spot on ear when flaring 15 g 2    blood sugar diagnostic (TRUE METRIX GLUCOSE TEST STRIP) Strp TEST TWO TIMES DAILY 200 strip 6    blood-glucose meter Misc [employer] True Metrix Air meter 1 each 0    calcitonin, salmon, (FORTICAL) 200  unit/actuation nasal spray 1 spray by Nasal route once daily. 3.7 mL 0    DULoxetine (CYMBALTA) 20 MG capsule Take 1 capsule (20 mg total) by mouth once daily. 90 capsule 3    estradioL (ESTRING) 2 mg (7.5 mcg /24 hour) vaginal ring Remove old Estring, place the new one every 3 months. 1 each 3    fluticasone propionate (FLONASE) 50 mcg/actuation nasal spray 2 sprays (100 mcg total) by Each Nostril route once daily. 48 g 2    furosemide (LASIX) 20 MG tablet Take 1 tablet (20 mg total) by mouth every other day. 15 tablet 11    gabapentin (NEURONTIN) 600 MG tablet Take 1 tablet (600 mg total) by mouth 2 (two) times daily.      glipiZIDE (GLUCOTROL) 5 MG tablet TAKE 1 TABLET TWICE DAILY BEFORE MEALS 180 tablet 1    HYDROcodone-acetaminophen (NORCO) 5-325 mg per tablet Take 1 tablet by mouth every 12 (twelve) hours as needed for Pain. 14 tablet 0    ipratropium (ATROVENT) 42 mcg (0.06 %) nasal spray 2 sprays by Each Nostril route 4 (four) times daily. 45 mL 4    ketotifen (ZADITOR) 0.025 % (0.035 %) ophthalmic solution Place 1-2 drops into both eyes once daily.      L.acid-B.bifidum-B.animal-FOS 25 billion cell -100 mg Cap Take 1 capsule by mouth once daily.      lancets (TRUEPLUS LANCETS) 28 gauge Misc Inject 1 lancet into the skin 2 (two) times daily before meals. 200 each 6    levothyroxine (SYNTHROID) 75 MCG tablet TAKE 1 TABLET EVERY DAY 30 tablet 0    loratadine (CLARITIN) 10 mg tablet Take 10 mg by mouth once daily.      lovastatin (MEVACOR) 40 MG tablet TAKE 1 TABLET NIGHTLY (SUBSTITUTED FOR MEVACOR) 90 tablet 3    metFORMIN (GLUCOPHAGE) 1000 MG tablet TAKE 1 TABLET TWICE DAILY WITH MEALS 180 tablet 1    mv-mn/folic acid/vit [id number] (ALIVE ONCE DAILY WOMEN 50 PLUS ORAL) Take 1 tablet by mouth once daily.      nirmatrelvir-ritonavir (PAXLOVID) 300 mg (150 mg x 2)-100 mg copackaged tablets (EUA) Take 3 tablets by mouth 2 (two) times daily. Each dose contains 2 nirmatrelvir (pink tablets) and 1 ritonavir (white  "tablet). Take all 3 tablets together (Patient not taking: Reported on 9/5/2024) 30 tablet 0    omeprazole (PRILOSEC) 20 MG capsule TAKE 1 CAPSULE BY MOUTH DAILY AS NEEDED (ACID REFLUX). 90 capsule 3    pen needle, diabetic (BD ULTRA-FINE EDUARDO PEN NEEDLE) 32 gauge x 5/32" Ndle USE AS DIRECTED 200 each 6    semaglutide (RYBELSUS) 14 mg tablet Take 1 tablet (14 mg total) by mouth once daily. 90 tablet 3    spironolactone (ALDACTONE) 50 MG tablet Take 1 tablet (50 mg total) by mouth every other day. (Patient not taking: Reported on 9/5/2024) 15 tablet 11    tiZANidine (ZANAFLEX) 4 MG tablet Take 1 tablet (4 mg total) by mouth every 8 (eight) hours as needed (muscle tension). 60 tablet 1    UNABLE TO FIND Take 1 tablet by mouth 2 (two) times a day. medication name: Magnesium 400mg, Calcium 1000 mg, D3 15mcg, Zinc 15mg      valACYclovir (VALTREX) 500 MG tablet Take 1 tablet (500 mg total) by mouth once daily. 90 tablet 3    valsartan (DIOVAN) 160 MG tablet Take 0.5 tablets (80 mg total) by mouth once daily.       Current Facility-Administered Medications   Medication Dose Route Frequency Provider Last Rate Last Admin    capsaicin-skin cleanser patch 4 patch  4 patch Topical (Top) 1 time in Clinic/HOD            Past Medical History:   Diagnosis Date    Allergy     Angio-edema     Arthritis     Cataract     bilateral - not removed    Cerebrovascular malformation     Cirrhosis 11/24/2020    Colon polyps     Diabetes mellitus, type 2     Diabetic peripheral neuropathy     Edema of both feet 8/19/2022    GERD (gastroesophageal reflux disease)     Herpes infection     Hypothyroidism     Kidney stones     Mild nonproliferative diabetic retinopathy of 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 edema associated with type 2 diabetes mellitus 4/18/2019    DEL RIO (nonalcoholic steatohepatitis) 8/19/2022    Nausea & vomiting 11/23/2015    Obesity, morbid     Ovarian cyst     Seizure disorder, focal motor     Sleep apnea     Type II or unspecified type " diabetes mellitus with neurological manifestations, uncontrolled(250.62)     Urticaria        Past Surgical History:   Procedure Laterality Date    CARPAL TUNNEL RELEASE Right 2014    CATARACT EXTRACTION W/  INTRAOCULAR LENS IMPLANT Right 9/19/2023    Procedure: EXTRACTION, CATARACT, WITH IOL INSERTION;  Surgeon: Lyndon Ortiz MD;  Location: [facility] OR;  Service: Ophthalmology;  Laterality: Right;    CATARACT EXTRACTION W/  INTRAOCULAR LENS IMPLANT Left 10/3/2023    Procedure: EXTRACTION, CATARACT, WITH IOL INSERTION;  Surgeon: Lyndon Ortiz MD;  Location: [facility] OR;  Service: Ophthalmology;  Laterality: Left;    COLONOSCOPY      COLONOSCOPY N/A 9/13/2017    Procedure: COLONOSCOPY Golytely;  Surgeon: Gisela Wall MD;  Location: [facility];  Service: Endoscopy;  Laterality: N/A;    COLONOSCOPY N/A 9/9/2022    Procedure: COLONOSCOPY Extended Suprep;  Surgeon: Britt Jasso MD;  Location: [facility];  Service: Endoscopy;  Laterality: N/A;    CYST REMOVAL      skin; multiples    ESOPHAGOGASTRODUODENOSCOPY Left 10/15/2021    Procedure: EGD (ESOPHAGOGASTRODUODENOSCOPY);  Surgeon: Luis Fernando Taveras MD;  Location: [facility] (4TH FLR);  Service: Endoscopy;  Laterality: Left;  cirrhosis labwork am of procedure  last seizure 1973  COVID test on 10/12/21 at [facility]    ESOPHAGOGASTRODUODENOSCOPY N/A 3/10/2023    Procedure: EGD (ESOPHAGOGASTRODUODENOSCOPY);  Surgeon: Christa Caldera MD;  Location: [facility] (2ND FLR);  Service: Endoscopy;  Laterality: N/A;  Medically Urgent  cirrohsis-labs done on 2/9/23 (< 90 days)  New onset A-fib  3/1 instructions to portal-st  Precall complete- [place]    EXTRACORPOREAL SHOCK WAVE LITHOTRIPSY  2002    HYSTERECTOMY  1984    JOINT REPLACEMENT Right 03/06/2019    knee    KIDNEY STONE SURGERY      KNEE ARTHROPLASTY Right 3/6/2019    Procedure: ARTHROPLASTY, KNEE;  Surgeon: Pj Gamboa MD;  Location: [facility];  Service: Orthopedics;  Laterality: Right;  Kasey Case  "notified 2/21, CC)    THYROIDECTOMY  1977         TONSILLECTOMY, ADENOIDECTOMY      TRIGGER FINGER RELEASE      TUBAL LIGATION         Lab Results   Component Value Date     09/09/2024    K 4.3 09/09/2024     09/09/2024    CO2 25 09/09/2024    GLU 99 09/09/2024    BUN 24 (H) 09/09/2024    CREATININE 1.2 09/09/2024    CALCIUM 9.7 09/09/2024    PROT 7.7 09/09/2024    ALBUMIN 3.4 (L) 09/09/2024    BILITOT 0.3 09/09/2024    ALKPHOS 118 09/09/2024    AST 30 09/09/2024    ALT 23 09/09/2024    ANIONGAP 11 09/09/2024    ESTGFRAFRICA >60.0 02/07/2022    EGFRNONAA >60.0 02/07/2022      Lab Results   Component Value Date    WBC 10.24 06/03/2024    RBC 4.42 06/03/2024    HGB 14.1 06/03/2024    HCT 44.0 06/03/2024     (H) 06/03/2024    MCH 31.9 (H) 06/03/2024    MCHC 32.0 06/03/2024    RDW 14.6 (H) 06/03/2024     06/03/2024    MPV 10.2 06/03/2024    GRAN 6.6 06/03/2024    GRAN 64.7 06/03/2024    LYMPH 2.3 06/03/2024    LYMPH 22.8 06/03/2024    MONO 1.0 06/03/2024    MONO 9.3 06/03/2024    EOS 0.3 06/03/2024    BASO 0.03 06/03/2024    EOSINOPHIL 2.5 06/03/2024    BASOPHIL 0.3 06/03/2024    DIFFMETHOD Automated 06/03/2024      Lab Results   Component Value Date    MG 1.9 02/09/2023      Lab Results   Component Value Date    PHOS 2.9 11/20/2020      Lab Results   Component Value Date    NTNQKRGB07EC 50 11/08/2019      Lab Results   Component Value Date    PTH 41.0 11/06/2020      Lab Results   Component Value Date    TSH 2.060 07/09/2024      Lab Results   Component Value Date    FREET4 1.02 07/09/2024      Lab Results   Component Value Date    HGBA1C 6.3 (H) 09/03/2024    ESTIMATEDAVG 134 (H) 09/03/2024      No results found for: "FREETESTOSTE"         Vital Signs (Most Recent)  There were no vitals filed for this visit.      Today's Visit and Bone Health History     Have you had any loss of height or gotten shorter since your 20's? .5   Are you postmenopausal? Yes   Please indicate how menopause occured. " Naturally   Please indicate at what age you became postmenopausal. Unkn   Have you ever taken any type of hormone replacement therapy? Yes   If you have had hormone replacement therapy please indicate which hormone therapy was used and for how long. Estrinol   Do you currently smoke? No   Have you ever smoked in the past? No   How many alcoholic beverages do you drink per day? 0   How many caffeinated beverages do you drink per day? 1 to 3   Have you had 2 or more falls in the past 12 months? No   How active have you been in the past 12 months? Not active ( in the house only )   Did either of your parents have a hip fracture after the age of 50? No   Have you ever been diagnosed with any of the following? Diabetes Mellitus    Fracture   Do you currently have a fracture? Yes   If you currently have a fracture please indicate where and when the fracture occured. 8/4. T12 L1   Have you broken any other bones since you turned 51 or older? No   Have you ever had a bone density test or DXA scan? Yes   Are you currently taking or have you ever taken any of the following medications? Fosamax (Alendronate)   Do you take Calcium? Yes   If you take Calcium what dose? 1000 mg   Do you take Vitamin D? Yes   If you take Vitamin D what dose? 125 mcg   Have you ever been diagnosed with cancer? No   Have you ever been treated for cancer with high beam radiation or had radioactive implants? No            she has medical history and medication use known to be associated with bone loss and osteoporosis to include pathological vertebral fractures, previous diagnosis of osteoporosis height loss of 5 in history vitamin-D deficiency diabetes type 2 opioid pain medications.     The last DXA was performed in 06/08/2023.          T-Score Hip: -2.5     T-Score Spine: -1.6      FRAX:      Major Fx.: 13%         Hip Fx.: 3.0%      Review of Systems:  ROS:  Constitutional: no fever or chills, positive obstructive sleep apnea, physical  deconditioning, edema of the lower extremities poor posture,  Eyes: no visual changes, positive for diabetic retinopathy  ENT: no nasal congestion or sore throat, recurrent frontal sinusitis  Respiratory: no respiratory symptoms  Cardiovascular: no chest pain or palpitations, positive for hypertension, coronary artery disease abdominal aortic atherosclerosis  Gastrointestinal: no nausea or vomiting, tolerating diet, positive for GERD, diverticulosis cirrhosis of the liver, nonalcoholic steatohepatitis secondary esophageal varices without bleeding  Genitourinary: no hematuria or dysuria, positive history of renal calculi, protein urea, hypocalcemia, dysuria, chronic UTIs,  CKD stage IIIB  Integument/Breast: no rash or pruritis  Hematologic/Lymphatic: no easy bruising or lymphadenopathy, purpura  Musculoskeletal: no arthralgias or myalgias, positive trigger finger of the right, sacroiliitis, osteopenia, chronic pain of the right knee, status post right TKA, muscle weakness of the lower extremities, towards de Quervain tenosynovitis of the left decreased strength of the trunk and back bilateral low back pain without sciatica  Neurological: no seizures or tremors, positive carpal tunnel syndrome on the right, facet arthritis of the lumbar spine, diabetic polyneuropathy facet arthritis of the cervical spine  Behavioral/Psych: no auditory or visual hallucinations  Endocrine: no heat or cold intolerance, positive vitamin-D deficiency, morbid obesity BMI 42.46, diabetes type 2 with stage IIIA kidney disease, hypothyroidism, thyroid nodule, osteoporosis      OBJECTIVE:     PHYSICAL EXAM:     ,                  General: no acute distress  Behavioral/Psych: normal judgment and insight  Skin: no rash  Head/Neck: atraumatic, normocephalic, without obvious abnormality  Respiratory: normal respiratory effort  Cardiac: regular rate and rhythm  Vascular: pulses present  Abdomen: soft, non-tender  Musculoskeletal: no joint  tenderness, deformity or swelling               ASSESSMENT/PLAN:     Assessment:    Osteoporosis, at high risk for future fractures, history of pathological vertebral fracture.    Plan:   30-45 minutes spent in face-to-face consultation with patient and her family members today, discussing the disease management of osteoporosis, for the reduction of future fractures.  I have explained that bone strength is equal to bone quality. A bone density / DEXA scan is important to complement her care since her fracture was by definition a fragility fracture/traumatic fracture.  Over half of the encounter was spent (50%) counseling the patient on the disease of osteoporosis evidence-based and best practice treatment options available as well as recommendations for improvement of bone quality, the importance of nutritional supplements to include:  Calcium 5530-6108 mg daily in divided doses   Vitamin D3  2128-0732 units daily in divided doses.   Fall prevention and personal safety for the reduction of future fractures.    Clinicians Guidelines for the treatment of Osteoporosis 2023 as part of the National Osteoporosis Foundation were utilized as the evidence-based information provided.    Discussed pharmaceutical treatment options to include Biphosphatases, Denosumab, Abaloparatide and Teriparatide. Information to include indications for therapy, risks and benefits to treatment, and important safety information related to these treatments was provided and discussed.  Handouts were given to the patient for her review on osteoporosis and other pharmacological treatment information related to other available treatment options.    The importance of diet, impact exercise, and core strengthening with gait and balance exercise, through  both formal physical therapy programs and home exercise programs was discussed.       Bone health labs recommended and ordered    Will see her back following lab test to discuss treatment options most  likely anabolic medications

## 2024-09-10 NOTE — PROGRESS NOTES
Spoke with patient on the phone with CT results which shows continued presence of the T12 and L1 acute compression fractures.  Pain has fluctuated but is worse than when I saw her on 9/5.  She still wants to proceed with the vertebral augmentation.      Patient also has worsening Kyphosis no CT Compared to before the compression fracture    Myron Wang

## 2024-09-10 NOTE — TELEPHONE ENCOUNTER
Spoke with pt. Informed pt. Can only move her allergy injection appointment to the week of 9/23/24.Pt. decided to keep her appointment as is.

## 2024-09-16 ENCOUNTER — PATIENT OUTREACH (OUTPATIENT)
Dept: EMERGENCY MEDICINE | Facility: HOSPITAL | Age: 74
End: 2024-09-16
Payer: MEDICARE

## 2024-09-16 ENCOUNTER — TELEPHONE (OUTPATIENT)
Dept: PAIN MEDICINE | Facility: CLINIC | Age: 74
End: 2024-09-16
Payer: MEDICARE

## 2024-09-16 LAB — ALP BONE SERPL-MCNC: 18.5 UG/L (ref 5.6–29)

## 2024-09-17 ENCOUNTER — OFFICE VISIT (OUTPATIENT)
Dept: ORTHOPEDICS | Facility: CLINIC | Age: 74
End: 2024-09-17
Payer: MEDICARE

## 2024-09-17 DIAGNOSIS — M80.80XA PATHOLOGICAL FRACTURE DUE TO OSTEOPOROSIS, UNSPECIFIED FRACTURE SITE, UNSPECIFIED OSTEOPOROSIS TYPE, INITIAL ENCOUNTER: ICD-10-CM

## 2024-09-17 DIAGNOSIS — M81.0 OSTEOPOROSIS, UNSPECIFIED OSTEOPOROSIS TYPE, UNSPECIFIED PATHOLOGICAL FRACTURE PRESENCE: Primary | ICD-10-CM

## 2024-09-17 DIAGNOSIS — M81.6 LOCALIZED OSTEOPOROSIS OF LEQUESNE: ICD-10-CM

## 2024-09-17 PROCEDURE — 3061F NEG MICROALBUMINURIA REV: CPT | Mod: HCNC,CPTII,S$GLB, | Performed by: PHYSICIAN ASSISTANT

## 2024-09-17 PROCEDURE — 99214 OFFICE O/P EST MOD 30 MIN: CPT | Mod: HCNC,S$GLB,, | Performed by: PHYSICIAN ASSISTANT

## 2024-09-17 PROCEDURE — 99999 PR PBB SHADOW E&M-EST. PATIENT-LVL III: CPT | Mod: PBBFAC,HCNC,, | Performed by: PHYSICIAN ASSISTANT

## 2024-09-17 PROCEDURE — 4010F ACE/ARB THERAPY RXD/TAKEN: CPT | Mod: HCNC,CPTII,S$GLB, | Performed by: PHYSICIAN ASSISTANT

## 2024-09-17 PROCEDURE — 1160F RVW MEDS BY RX/DR IN RCRD: CPT | Mod: HCNC,CPTII,S$GLB, | Performed by: PHYSICIAN ASSISTANT

## 2024-09-17 PROCEDURE — 3066F NEPHROPATHY DOC TX: CPT | Mod: HCNC,CPTII,S$GLB, | Performed by: PHYSICIAN ASSISTANT

## 2024-09-17 PROCEDURE — 1159F MED LIST DOCD IN RCRD: CPT | Mod: HCNC,CPTII,S$GLB, | Performed by: PHYSICIAN ASSISTANT

## 2024-09-17 PROCEDURE — 3044F HG A1C LEVEL LT 7.0%: CPT | Mod: HCNC,CPTII,S$GLB, | Performed by: PHYSICIAN ASSISTANT

## 2024-09-17 PROCEDURE — 1157F ADVNC CARE PLAN IN RCRD: CPT | Mod: HCNC,CPTII,S$GLB, | Performed by: PHYSICIAN ASSISTANT

## 2024-09-17 NOTE — PROGRESS NOTES
Chief Complaint & History of Present Illness:  Vivienne Jose is a established patient 74 y.o. female who is seen here today for review of lab results, DEXA scan results, and determine a treatment plan for her osteoporosis.  she has reviewed the information provided at her initial visit.  After review of treatment options together we has elected to proceed with Tymlos as her treatment option today for her osteoporosis and to reduce risk of future fractures.        Review of patient's allergies indicates:   Allergen Reactions    Sulfa (sulfonamide antibiotics) Nausea Only    Ciprofloxacin     Januvia [sitagliptin]      abd pain    Lisinopril     Lotensin [benazepril]     Nexium [esomeprazole magnesium] Other (See Comments)     Gas         Current Outpatient Medications   Medication Sig Dispense Refill    alendronate (FOSAMAX) 70 MG tablet Take 1 tablet (70 mg total) by mouth every 7 days. 4 tablet 11    ascorbic acid, vitamin C, (VITAMIN C) 100 MG tablet Take 500 mg by mouth 2 (two) times daily.       aspirin (ECOTRIN) 81 MG EC tablet Take 81 mg by mouth once daily.      betamethasone dipropionate (DIPROLENE) 0.05 % ointment Apply topically 2 (two) times daily. Use to affected areas for up to 2 weeks then take a 1 week break or decrease to 3 times weekly. Do not apply to groin or face. Use to spot on ear when flaring 15 g 2    blood sugar diagnostic (TRUE METRIX GLUCOSE TEST STRIP) Strp TEST TWO TIMES DAILY 200 strip 6    blood-glucose meter Misc [employer] True Metrix Air meter 1 each 0    calcitonin, salmon, (FORTICAL) 200 unit/actuation nasal spray 1 spray by Nasal route once daily. 3.7 mL 0    DULoxetine (CYMBALTA) 20 MG capsule Take 1 capsule (20 mg total) by mouth once daily. 90 capsule 3    estradioL (ESTRING) 2 mg (7.5 mcg /24 hour) vaginal ring Remove old Estring, place the new one every 3 months. 1 each 3    fluticasone propionate (FLONASE) 50 mcg/actuation nasal spray 2 sprays (100 mcg total) by Each Nostril  "route once daily. 48 g 2    furosemide (LASIX) 20 MG tablet Take 1 tablet (20 mg total) by mouth every other day. 15 tablet 11    gabapentin (NEURONTIN) 600 MG tablet Take 1 tablet (600 mg total) by mouth 2 (two) times daily.      glipiZIDE (GLUCOTROL) 5 MG tablet TAKE 1 TABLET TWICE DAILY BEFORE MEALS 180 tablet 1    HYDROcodone-acetaminophen (NORCO) 5-325 mg per tablet Take 1 tablet by mouth every 12 (twelve) hours as needed for Pain. 14 tablet 0    ipratropium (ATROVENT) 42 mcg (0.06 %) nasal spray 2 sprays by Each Nostril route 4 (four) times daily. 45 mL 4    ketotifen (ZADITOR) 0.025 % (0.035 %) ophthalmic solution Place 1-2 drops into both eyes once daily.      L.acid-B.bifidum-B.animal-FOS 25 billion cell -100 mg Cap Take 1 capsule by mouth once daily.      lancets (TRUEPLUS LANCETS) 28 gauge Misc Inject 1 lancet into the skin 2 (two) times daily before meals. 200 each 6    levothyroxine (SYNTHROID) 75 MCG tablet TAKE 1 TABLET EVERY DAY 30 tablet 0    loratadine (CLARITIN) 10 mg tablet Take 10 mg by mouth once daily.      lovastatin (MEVACOR) 40 MG tablet TAKE 1 TABLET NIGHTLY (SUBSTITUTED FOR MEVACOR) 90 tablet 3    metFORMIN (GLUCOPHAGE) 1000 MG tablet TAKE 1 TABLET TWICE DAILY WITH MEALS 180 tablet 1    mv-mn/folic acid/vit [id number] (ALIVE ONCE DAILY WOMEN 50 PLUS ORAL) Take 1 tablet by mouth once daily.      nirmatrelvir-ritonavir (PAXLOVID) 300 mg (150 mg x 2)-100 mg copackaged tablets (EUA) Take 3 tablets by mouth 2 (two) times daily. Each dose contains 2 nirmatrelvir (pink tablets) and 1 ritonavir (white tablet). Take all 3 tablets together 30 tablet 0    omeprazole (PRILOSEC) 20 MG capsule TAKE 1 CAPSULE BY MOUTH DAILY AS NEEDED (ACID REFLUX). 90 capsule 3    pen needle, diabetic (BD ULTRA-FINE EDUARDO PEN NEEDLE) 32 gauge x 5/32" Ndle USE AS DIRECTED 200 each 6    semaglutide (RYBELSUS) 14 mg tablet Take 1 tablet (14 mg total) by mouth once daily. 90 tablet 3    spironolactone (ALDACTONE) 50 MG tablet " Take 1 tablet (50 mg total) by mouth every other day. 15 tablet 11    tiZANidine (ZANAFLEX) 4 MG tablet Take 1 tablet (4 mg total) by mouth every 8 (eight) hours as needed (muscle tension). 60 tablet 1    UNABLE TO FIND Take 1 tablet by mouth 2 (two) times a day. medication name: Magnesium 400mg, Calcium 1000 mg, D3 15mcg, Zinc 15mg      valACYclovir (VALTREX) 500 MG tablet Take 1 tablet (500 mg total) by mouth once daily. 90 tablet 3    valsartan (DIOVAN) 160 MG tablet Take 0.5 tablets (80 mg total) by mouth once daily.      azelastine (ASTELIN) 137 mcg (0.1 %) nasal spray 2 sprays (274 mcg total) by Nasal route 2 (two) times daily. 120 mL 4     Current Facility-Administered Medications   Medication Dose Route Frequency Provider Last Rate Last Admin    capsaicin-skin cleanser patch 4 patch  4 patch Topical (Top) 1 time in Clinic/HOD            Past Medical History:   Diagnosis Date    Allergy     Angio-edema     Arthritis     Cataract     bilateral - not removed    Cerebrovascular malformation     Cirrhosis 11/24/2020    Colon polyps     Diabetes mellitus, type 2     Diabetic peripheral neuropathy     Edema of both feet 8/19/2022    GERD (gastroesophageal reflux disease)     Herpes infection     Hypothyroidism     Kidney stones     Mild nonproliferative diabetic retinopathy of 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 edema associated with type 2 diabetes mellitus 4/18/2019    DEL RIO (nonalcoholic steatohepatitis) 8/19/2022    Nausea & vomiting 11/23/2015    Obesity, morbid     Ovarian cyst     Seizure disorder, focal motor     Sleep apnea     Type II or unspecified type diabetes mellitus with neurological manifestations, uncontrolled(250.62)     Urticaria        Past Surgical History:   Procedure Laterality Date    CARPAL TUNNEL RELEASE Right 2014    CATARACT EXTRACTION W/  INTRAOCULAR LENS IMPLANT Right 9/19/2023    Procedure: EXTRACTION, CATARACT, WITH IOL INSERTION;  Surgeon: Lyndon Ortiz MD;  Location: [facility] OR;   Service: Ophthalmology;  Laterality: Right;    CATARACT EXTRACTION W/  INTRAOCULAR LENS IMPLANT Left 10/3/2023    Procedure: EXTRACTION, CATARACT, WITH IOL INSERTION;  Surgeon: Lyndon Ortiz MD;  Location: [facility] OR;  Service: Ophthalmology;  Laterality: Left;    COLONOSCOPY      COLONOSCOPY N/A 9/13/2017    Procedure: COLONOSCOPY Golytely;  Surgeon: Gisela Wall MD;  Location: [facility] ENDO;  Service: Endoscopy;  Laterality: N/A;    COLONOSCOPY N/A 9/9/2022    Procedure: COLONOSCOPY Extended Suprep;  Surgeon: Britt Jasso MD;  Location: [facility] ENDO;  Service: Endoscopy;  Laterality: N/A;    CYST REMOVAL      skin; multiples    ESOPHAGOGASTRODUODENOSCOPY Left 10/15/2021    Procedure: EGD (ESOPHAGOGASTRODUODENOSCOPY);  Surgeon: Luis Fernando Taveras MD;  Location: [facility] (4TH FLR);  Service: Endoscopy;  Laterality: Left;  cirrhosis labwork am of procedure  last seizure 1973  COVID test on 10/12/21 at [facility]    ESOPHAGOGASTRODUODENOSCOPY N/A 3/10/2023    Procedure: EGD (ESOPHAGOGASTRODUODENOSCOPY);  Surgeon: Christa Caldera MD;  Location: [facility] (2ND FLR);  Service: Endoscopy;  Laterality: N/A;  Medically Urgent  cirrohsis-labs done on 2/9/23 (< 90 days)  New onset A-fib  3/1 instructions to portal-st  Precall complete- [place]    EXTRACORPOREAL SHOCK WAVE LITHOTRIPSY  2002    HYSTERECTOMY  1984    JOINT REPLACEMENT Right 03/06/2019    knee    KIDNEY STONE SURGERY      KNEE ARTHROPLASTY Right 3/6/2019    Procedure: ARTHROPLASTY, KNEE;  Surgeon: Pj Gamboa MD;  Location: [facility];  Service: Orthopedics;  Laterality: Right;  Depuy (Stephen notified 2/21, CC)    THYROIDECTOMY  1977         TONSILLECTOMY, ADENOIDECTOMY      TRIGGER FINGER RELEASE      TUBAL LIGATION         Lab Results   Component Value Date     09/09/2024    K 4.3 09/09/2024     09/09/2024    CO2 25 09/09/2024    GLU 99 09/09/2024    BUN 24 (H) 09/09/2024    CREATININE 1.2 09/09/2024    CALCIUM 9.7 09/09/2024    PROT  "7.7 09/09/2024    ALBUMIN 3.4 (L) 09/09/2024    BILITOT 0.3 09/09/2024    ALKPHOS 118 09/09/2024    AST 30 09/09/2024    ALT 23 09/09/2024    ANIONGAP 11 09/09/2024    ESTGFRAFRICA >60.0 02/07/2022    EGFRNONAA >60.0 02/07/2022      Lab Results   Component Value Date    WBC 10.24 06/03/2024    RBC 4.42 06/03/2024    HGB 14.1 06/03/2024    HCT 44.0 06/03/2024     (H) 06/03/2024    MCH 31.9 (H) 06/03/2024    MCHC 32.0 06/03/2024    RDW 14.6 (H) 06/03/2024     06/03/2024    MPV 10.2 06/03/2024    GRAN 6.6 06/03/2024    GRAN 64.7 06/03/2024    LYMPH 2.3 06/03/2024    LYMPH 22.8 06/03/2024    MONO 1.0 06/03/2024    MONO 9.3 06/03/2024    EOS 0.3 06/03/2024    BASO 0.03 06/03/2024    EOSINOPHIL 2.5 06/03/2024    BASOPHIL 0.3 06/03/2024    DIFFMETHOD Automated 06/03/2024      Lab Results   Component Value Date    MG 1.9 02/09/2023      Lab Results   Component Value Date    PHOS 2.9 11/20/2020      Lab Results   Component Value Date    NPRMKFCZ11GR 99 (H) 09/10/2024      Lab Results   Component Value Date    PTH 33.1 09/10/2024      Lab Results   Component Value Date    TSH 1.925 09/10/2024      Lab Results   Component Value Date    FREET4 1.12 09/10/2024      Lab Results   Component Value Date    HGBA1C 6.3 (H) 09/03/2024    ESTIMATEDAVG 134 (H) 09/03/2024      No results found for: "FREETESTOSTE"     DEXA Scan was reviewed and demonstrates :       T-Score Hip: -2.5                                      T-Score Spine: -1.6                                       FRAX:                                                  Major Fx.: 13%                                                     Hip Fx.: 3.0%                                    Vital Signs (Most Recent)  There were no vitals filed for this visit.      Review of Systems:  ROS:  Constitutional: no fever or chills, positive obstructive sleep apnea, physical deconditioning, edema of the lower extremities poor posture,  Eyes: no visual changes, positive for " diabetic retinopathy  ENT: no nasal congestion or sore throat, recurrent frontal sinusitis  Respiratory: no respiratory symptoms  Cardiovascular: no chest pain or palpitations, positive for hypertension, coronary artery disease abdominal aortic atherosclerosis  Gastrointestinal: no nausea or vomiting, tolerating diet, positive for GERD, diverticulosis cirrhosis of the liver, nonalcoholic steatohepatitis secondary esophageal varices without bleeding  Genitourinary: no hematuria or dysuria, positive history of renal calculi, protein urea, hypocalcemia, dysuria, chronic UTIs,  CKD stage IIIB  Integument/Breast: no rash or pruritis  Hematologic/Lymphatic: no easy bruising or lymphadenopathy, purpura  Musculoskeletal: no arthralgias or myalgias, positive trigger finger of the right, sacroiliitis, osteopenia, chronic pain of the right knee, status post right TKA, muscle weakness of the lower extremities, towards de Quervain tenosynovitis of the left decreased strength of the trunk and back bilateral low back pain without sciatica  Neurological: no seizures or tremors, positive carpal tunnel syndrome on the right, facet arthritis of the lumbar spine, diabetic polyneuropathy facet arthritis of the cervical spine  Behavioral/Psych: no auditory or visual hallucinations  Endocrine: no heat or cold intolerance, positive vitamin-D deficiency, morbid obesity BMI 42.46, diabetes type 2 with stage IIIA kidney disease, hypothyroidism, thyroid nodule, osteoporosis    Vivienne Jose was given instructions on administration of Tymlos today.  she will be contacted by the speciality pharmacy when her medication is available, and will be scheduled to receive further detailed  instruction on  administration or when and where to receive her treatment.     she will continue with her calcium and vitamin D.  Fall prevention and personal safety have been reviewed.    We have discussed the need for core strengthening and balance exercises for  fall prevention.  Discussed the risk of osteonecrosis of the jaw with bisphosphonates with incidence estimated from 1-10/513337 treated patients. Discussed that the incidence of ONJ is higher in patients undergoing invasive dental surgery (excludes routine cleaning, fillings or root canals). Dental work should ideally be completed prior to initiation of treatment; I told her to let her dentist know at the upcoming appointment that we are planning on treating with alendronate to prevent fractures. Preventative measures such as good oral hygiene encouraged.     Discussed the risk of atypical femur fractures with bisphosphonates and denosumab. The exact incidence is unknown, but has been estimated at approximately 1 in 73310 patients treated with oral bisphosphonates and increasing incidence was correlated with increased duration of bisphosphonate use. Despite this risk, the significant benefit on fracture reduction far outweighs the potential for this rare event.         Assessment and plan:      ICD-10-CM ICD-9-CM   1. Osteoporosis, unspecified osteoporosis type, unspecified pathological fracture presence  M81.0 733.00   2. Localized osteoporosis of [place]  M81.6 733.09   3. Pathological fracture due to osteoporosis, unspecified fracture site, unspecified osteoporosis type, initial encounter  M80.80XA 733.10     733.00       Abaloparatide 80 mcg SubQ daily

## 2024-09-18 ENCOUNTER — TELEPHONE (OUTPATIENT)
Dept: HEPATOLOGY | Facility: CLINIC | Age: 74
End: 2024-09-18
Payer: MEDICARE

## 2024-09-18 NOTE — TELEPHONE ENCOUNTER
----- Message from Ashley Asher sent at 9/18/2024  1:38 PM CDT -----  Regarding: coordinate appts w//Dr. Christianson  Contact: [phone]  PT called to schedule 6 mo f/u - Please also coordinate with  appts  Tucker Jose MRN [id number]    Please coordinate appts w/PT &  and also schedule labs 1 week prior    Patient can be contacted @#  [phone]

## 2024-09-18 NOTE — PROGRESS NOTES
"Chief Complaint   Patient presents with    RECHECK     Vaginal problem       Initial /78   Pulse 80   Temp 97.9  F (36.6  C) (Tympanic)   Resp 16   Ht 1.708 m (5' 7.25\")   Wt 88.2 kg (194 lb 8 oz)   LMP 08/01/2024 (Approximate)   SpO2 99%   BMI 30.24 kg/m   Estimated body mass index is 30.24 kg/m  as calculated from the following:    Height as of this encounter: 1.708 m (5' 7.25\").    Weight as of this encounter: 88.2 kg (194 lb 8 oz).  Medication Review: complete    The next two questions are to help us understand your food security.  If you are feeling you need any assistance in this area, we have resources available to support you today.          5/24/2024   SDOH- Food Insecurity   Within the past 12 months, did you worry that your food would run out before you got money to buy more? N   Within the past 12 months, did the food you bought just not last and you didn t have money to get more? N            Elsa Gomez, OTILIA      " ..See Media Tab for Allergy Injection information.  1:1 ( vial  [id number] #1A )  today 2019, ok to give injection per Dr. David

## 2024-09-19 NOTE — ANESTHESIA PAT ROS NOTE
"Anesthesia Assessment: Preoperative EQUATION    Planned Procedure: Procedure(s) (LRB):  T12 and L1 SpineJack ([place]et) (Bilateral)  Requested Anesthesia Type:General  Surgeon: Myron Ocasio DO  Service: Pain Management  Known or anticipated Date of Surgery:9/23/2024    Surgeon notes: reviewed    Electronic QUestionnaire Assessment completed via nurse interview with patient.        "PASTE DATA"        Triage considerations:     The patient has no apparent active cardiac condition (No unstable coronary Syndrome such as severe unstable angina or recent [<1 month] myocardial infarction, decompensated CHF, severe valvular   disease or significant arrhythmia)    Previous anesthesia records:GETA  12/28/16 Placement Time: 1323 Method of Intubation: Direct laryngoscopy Inserted by: CRNA Airway Device: Endotracheal Tube Mask Ventilation: Easy - oral Intubated: Postinduction Blade: Hannon #2 Airway Device Size: 7.5 Style: Cuffed Cuff Inflation: Minimal occlusive pressure Inflation Amount (mL): 7 Placement Verified By: Auscultation;Capnometry;ETT Condensation Grade: Grade II Complicating Factors: Obesity;Short neck Findings Post-Intubation: Positive EtCO2;Bilateral breath sounds;Atraumatic/Condition of teeth unchanged Depth of Insertion (cm): 22 Secured at: Lips Complications: None Breath Sounds: Equal Bilateral Insertion attempts (enter comment if more than 2 attempts): 1    Last PCP note: within 1 month   Subspecialty notes: Ortho    Other important co-morbidities:   Personal Hx of Anesthesia complications, Post-Operative Nausea/Vomiting (pt with hx of severe motion sickness and PONV with every procedure), in the past, but not with recent anesthetics / prophylaxis      Tests already available:  Results have been reviewed.            Instructions given. (See in Nurse's note)    Optimization:  Anesthesia Preop Clinic Assessment  Indicated    Medical Opinion Indicated       Sub-specialist consult indicated:   TBD       " Pre-habilitation suggested:   Physical Therapy  /  Nutrition Consult /  Outpatient Case Management Consult      Plan:    Testing:  BMP, CMP, and TSH   Pre-anesthesia  visit       Visit focus: airway concerns      Short neck     Consultation:Patient's PCP for a statement of optimization      Patient  has previously scheduled Medical Appointment:    Navigation: Tests Scheduled.              Consults scheduled.             Results will be tracked by Preop Clinic.                 Straight Line to surgery.               No tests, anesthesia preop clinic visit, or consult required.                  Plans per surgeon and Follow-up per Surgeon

## 2024-09-20 ENCOUNTER — DOCUMENTATION ONLY (OUTPATIENT)
Dept: ALLERGY | Facility: CLINIC | Age: 74
End: 2024-09-20
Payer: MEDICARE

## 2024-09-20 PROBLEM — M81.0 OSTEOPOROSIS: Status: ACTIVE | Noted: 2024-09-20

## 2024-09-20 NOTE — PRE-PROCEDURE INSTRUCTIONS
Patient reviewed on 9/20/2024.  Okay to proceed at [facility]. The following pre-procedure instructions and arrival time have been reviewed with patient via phone and sent to patient portal for review.  Patient verbalized an understanding.  Pt to be accompanied by Sarkis jackson of procedure as responsible .    Dear Vivienne ,     Please read over the following pre-procedure instructions in it's entirety as there is helpful information here to get you well prepared for your upcoming procedure.        You are scheduled for a procedure with Dr. Myron Ocasio on 9/23/2024.      Your scheduled arrival time is 6:45 am.  This arrival time is roughly 1 hour before your anticipated procedure time to allow sufficient time for pre-op..      You will need to change into a gown for this procedure.  This procedure will take place at the [facility] at the corner of [facility] and [facility].  [facility] [place] [facility] to [facility].  The address is:     [address] [place] [address].  [place] [address]     After entering the building, you will proceed to the second floor where you can check in with registration. You should take any medications that you routinely take for blood pressure, heart medications, thyroid, cholesterol, etc.      The fasting restrictions are dependent on whether or not you are receiving sedation.  Sedation is not available for all procedures.      Your fasting instructions are as follow:  General Anesthesia:  Noting to eat or drink after midnight prior to your procedure.  You are required to have a responsible  available to bring you home.  You are NOT allowed to drive yourself after procedure.      If you are on blood thinners, you need to follow the anticoagulation instructions that had been discussed previously.  You should only stop the blood thinners if it was approved by your primary care physician or your cardiologist.  In the event that you  are not able to stop your blood thinners, a blood thinner was not listed on your medication list, or we were not able to get clearance from your cardiologist, then the procedure may have to be postponed/canceled.      IF you were told to stop your blood thinners, this is how long you should generally hold some of the more common ones.  Remember that stopping blood thinners is only necessary for certain procedures. If you are unsure of your instructions, please call us.   Aspirin - 5 days  Plavix/Clopidogrel - 7 days  Warfarin / Coumadin - 5 days  Eliquis - 3 days  Pradaxa/Dabigatran - 4 days  Xarelto/Rivaroxaban - 3 days     If you are a diabetic, do not take your medication if you will be fasting, but bring it with you. Please plan on being here for roughly 2-3 hours.     Please call us if you have been sick (running fever, having any flu-like symptoms) or have been taking ANTIBIOTICS in the past 2 weeks or had any outpatient procedures other than with us (colonoscopy, endoscopy, OBGYN, dental, etc.).      If you have been previously COVID positive, you will need to hold off on your procedure until you are symptom free for 10 days. If you did not have any symptoms, you can have your procedure 10 days from your positive test result.          On the morning of your procedure:  *HOLD ALL VITAMINS, MINERALS, HERBS (INCLUDING HERBAL TEAS) AND SUPPLEMENTS  *SHOWER WITH ANTIBACTERIAL SOAP (EX. DIAL) NIGHT BEFORE AND MORNING OF PROCEDURE  *DO NOT APPLY ANY LOTIONS, OILS, POWDERS, PERFUME/COLOGNE, OINTMENTS, GELS, CREAMS, MAKEUP OR DEODORANT TO YOUR SKIN MORNING OF PROCEDURE  *LEAVE JEWELRY AND ANY VALUABLES AT HOME  *WEAR LOOSE COMFORTABLE CLOTHING (PREFERABLY A BUTTON UP SHIRT)     Please reply to this message as receipt of delivery           Thank you,  [facility]  [place], LPN [facility]  Pre-Admit

## 2024-09-22 RX ORDER — HYDROCODONE BITARTRATE AND ACETAMINOPHEN 5; 325 MG/1; MG/1
1 TABLET ORAL EVERY 6 HOURS PRN
Qty: 20 TABLET | Refills: 0 | Status: SHIPPED | OUTPATIENT
Start: 2024-09-22 | End: 2024-09-28

## 2024-09-23 ENCOUNTER — ANESTHESIA (OUTPATIENT)
Dept: SURGERY | Facility: HOSPITAL | Age: 74
End: 2024-09-23
Payer: MEDICARE

## 2024-09-23 ENCOUNTER — ANESTHESIA EVENT (OUTPATIENT)
Dept: SURGERY | Facility: HOSPITAL | Age: 74
End: 2024-09-23
Payer: MEDICARE

## 2024-09-23 ENCOUNTER — HOSPITAL ENCOUNTER (OUTPATIENT)
Facility: HOSPITAL | Age: 74
Discharge: HOME | End: 2024-09-23
Attending: STUDENT IN AN ORGANIZED HEALTH CARE EDUCATION/TRAINING PROGRAM | Admitting: STUDENT IN AN ORGANIZED HEALTH CARE EDUCATION/TRAINING PROGRAM
Payer: MEDICARE

## 2024-09-23 VITALS
RESPIRATION RATE: 18 BRPM | HEART RATE: 76 BPM | SYSTOLIC BLOOD PRESSURE: 117 MMHG | TEMPERATURE: 98 F | DIASTOLIC BLOOD PRESSURE: 55 MMHG | OXYGEN SATURATION: 96 %

## 2024-09-23 DIAGNOSIS — S32.010A COMPRESSION FRACTURE OF L1 LUMBAR VERTEBRA: ICD-10-CM

## 2024-09-23 PROBLEM — J01.10 ACUTE NON-RECURRENT FRONTAL SINUSITIS: Status: RESOLVED | Noted: 2024-06-21 | Resolved: 2024-09-23

## 2024-09-23 PROBLEM — N39.0 CHRONIC UTI: Status: RESOLVED | Noted: 2017-03-02 | Resolved: 2024-09-23

## 2024-09-23 LAB
GLUCOSE SERPL-MCNC: 127 MG/DL (ref 70–110)
POCT GLUCOSE: 127 MG/DL (ref 70–110)
POCT GLUCOSE: 135 MG/DL (ref 70–110)

## 2024-09-23 PROCEDURE — 36000707: Performed by: STUDENT IN AN ORGANIZED HEALTH CARE EDUCATION/TRAINING PROGRAM

## 2024-09-23 PROCEDURE — 22515 PERQ VERTEBRAL AUGMENTATION: CPT | Mod: ,,, | Performed by: STUDENT IN AN ORGANIZED HEALTH CARE EDUCATION/TRAINING PROGRAM

## 2024-09-23 PROCEDURE — 71000016 HC POSTOP RECOV ADDL HR: Performed by: STUDENT IN AN ORGANIZED HEALTH CARE EDUCATION/TRAINING PROGRAM

## 2024-09-23 PROCEDURE — 82962 GLUCOSE BLOOD TEST: CPT | Performed by: STUDENT IN AN ORGANIZED HEALTH CARE EDUCATION/TRAINING PROGRAM

## 2024-09-23 PROCEDURE — 25500020 PHARM REV CODE 255: Performed by: STUDENT IN AN ORGANIZED HEALTH CARE EDUCATION/TRAINING PROGRAM

## 2024-09-23 PROCEDURE — 63600175 PHARM REV CODE 636 W HCPCS: Performed by: NURSE ANESTHETIST, CERTIFIED REGISTERED

## 2024-09-23 PROCEDURE — 25000003 PHARM REV CODE 250: Performed by: STUDENT IN AN ORGANIZED HEALTH CARE EDUCATION/TRAINING PROGRAM

## 2024-09-23 PROCEDURE — 94761 N-INVAS EAR/PLS OXIMETRY MLT: CPT

## 2024-09-23 PROCEDURE — 71000033 HC RECOVERY, INTIAL HOUR: Performed by: STUDENT IN AN ORGANIZED HEALTH CARE EDUCATION/TRAINING PROGRAM

## 2024-09-23 PROCEDURE — 22514 PERQ VERTEBRAL AUGMENTATION: CPT | Mod: ,,, | Performed by: STUDENT IN AN ORGANIZED HEALTH CARE EDUCATION/TRAINING PROGRAM

## 2024-09-23 PROCEDURE — 36000706: Performed by: STUDENT IN AN ORGANIZED HEALTH CARE EDUCATION/TRAINING PROGRAM

## 2024-09-23 PROCEDURE — 37000008 HC ANESTHESIA 1ST 15 MINUTES: Performed by: STUDENT IN AN ORGANIZED HEALTH CARE EDUCATION/TRAINING PROGRAM

## 2024-09-23 PROCEDURE — 37000009 HC ANESTHESIA EA ADD 15 MINS: Performed by: STUDENT IN AN ORGANIZED HEALTH CARE EDUCATION/TRAINING PROGRAM

## 2024-09-23 PROCEDURE — 99900035 HC TECH TIME PER 15 MIN (STAT)

## 2024-09-23 PROCEDURE — C1713 ANCHOR/SCREW BN/BN,TIS/BN: HCPCS | Performed by: STUDENT IN AN ORGANIZED HEALTH CARE EDUCATION/TRAINING PROGRAM

## 2024-09-23 PROCEDURE — 63600175 PHARM REV CODE 636 W HCPCS: Mod: JZ,JG | Performed by: STUDENT IN AN ORGANIZED HEALTH CARE EDUCATION/TRAINING PROGRAM

## 2024-09-23 PROCEDURE — 27201423 OPTIME MED/SURG SUP & DEVICES STERILE SUPPLY: Performed by: STUDENT IN AN ORGANIZED HEALTH CARE EDUCATION/TRAINING PROGRAM

## 2024-09-23 PROCEDURE — 25000003 PHARM REV CODE 250: Performed by: NURSE ANESTHETIST, CERTIFIED REGISTERED

## 2024-09-23 PROCEDURE — 71000015 HC POSTOP RECOV 1ST HR: Performed by: STUDENT IN AN ORGANIZED HEALTH CARE EDUCATION/TRAINING PROGRAM

## 2024-09-23 PROCEDURE — C1726 CATH, BAL DIL, NON-VASCULAR: HCPCS | Performed by: STUDENT IN AN ORGANIZED HEALTH CARE EDUCATION/TRAINING PROGRAM

## 2024-09-23 PROCEDURE — 63600175 PHARM REV CODE 636 W HCPCS: Performed by: ANESTHESIOLOGY

## 2024-09-23 DEVICE — IMPLANTABLE DEVICE: Type: IMPLANTABLE DEVICE | Site: BACK | Status: FUNCTIONAL

## 2024-09-23 RX ORDER — ACETAMINOPHEN 500 MG
1000 TABLET ORAL ONCE AS NEEDED
Status: DISCONTINUED | OUTPATIENT
Start: 2024-09-23 | End: 2024-09-23

## 2024-09-23 RX ORDER — HYDROMORPHONE HYDROCHLORIDE 1 MG/ML
0.2 INJECTION, SOLUTION INTRAMUSCULAR; INTRAVENOUS; SUBCUTANEOUS EVERY 5 MIN PRN
Status: DISCONTINUED | OUTPATIENT
Start: 2024-09-23 | End: 2024-09-23 | Stop reason: HOSPADM

## 2024-09-23 RX ORDER — FENTANYL CITRATE 50 UG/ML
INJECTION, SOLUTION INTRAMUSCULAR; INTRAVENOUS
Status: DISCONTINUED | OUTPATIENT
Start: 2024-09-23 | End: 2024-09-23

## 2024-09-23 RX ORDER — LIDOCAINE HYDROCHLORIDE 20 MG/ML
INJECTION, SOLUTION INFILTRATION; PERINEURAL
Status: DISCONTINUED | OUTPATIENT
Start: 2024-09-23 | End: 2024-09-23 | Stop reason: HOSPADM

## 2024-09-23 RX ORDER — PHENYLEPHRINE HYDROCHLORIDE 10 MG/ML
INJECTION INTRAVENOUS
Status: DISCONTINUED | OUTPATIENT
Start: 2024-09-23 | End: 2024-09-23

## 2024-09-23 RX ORDER — HYDROCODONE BITARTRATE AND ACETAMINOPHEN 7.5; 325 MG/1; MG/1
1 TABLET ORAL ONCE AS NEEDED
Status: DISCONTINUED | OUTPATIENT
Start: 2024-09-23 | End: 2024-09-23

## 2024-09-23 RX ORDER — EPHEDRINE SULFATE 50 MG/ML
INJECTION, SOLUTION INTRAVENOUS
Status: DISCONTINUED | OUTPATIENT
Start: 2024-09-23 | End: 2024-09-23

## 2024-09-23 RX ORDER — BUPIVACAINE HYDROCHLORIDE 2.5 MG/ML
INJECTION, SOLUTION EPIDURAL; INFILTRATION; INTRACAUDAL
Status: DISCONTINUED | OUTPATIENT
Start: 2024-09-23 | End: 2024-09-23 | Stop reason: HOSPADM

## 2024-09-23 RX ORDER — ONDANSETRON HYDROCHLORIDE 2 MG/ML
INJECTION, SOLUTION INTRAVENOUS
Status: DISCONTINUED | OUTPATIENT
Start: 2024-09-23 | End: 2024-09-23

## 2024-09-23 RX ORDER — PROCHLORPERAZINE EDISYLATE 5 MG/ML
5 INJECTION INTRAMUSCULAR; INTRAVENOUS EVERY 30 MIN PRN
Status: DISCONTINUED | OUTPATIENT
Start: 2024-09-23 | End: 2024-09-23 | Stop reason: HOSPADM

## 2024-09-23 RX ORDER — OXYCODONE AND ACETAMINOPHEN 5; 325 MG/1; MG/1
1 TABLET ORAL
Status: DISCONTINUED | OUTPATIENT
Start: 2024-09-23 | End: 2024-09-23 | Stop reason: HOSPADM

## 2024-09-23 RX ORDER — FENTANYL CITRATE 50 UG/ML
25 INJECTION, SOLUTION INTRAMUSCULAR; INTRAVENOUS EVERY 5 MIN PRN
Status: COMPLETED | OUTPATIENT
Start: 2024-09-23 | End: 2024-09-23

## 2024-09-23 RX ORDER — PROPOFOL 10 MG/ML
VIAL (ML) INTRAVENOUS
Status: DISCONTINUED | OUTPATIENT
Start: 2024-09-23 | End: 2024-09-23

## 2024-09-23 RX ORDER — MIDAZOLAM HYDROCHLORIDE 1 MG/ML
INJECTION INTRAMUSCULAR; INTRAVENOUS
Status: DISCONTINUED | OUTPATIENT
Start: 2024-09-23 | End: 2024-09-23

## 2024-09-23 RX ORDER — CEFAZOLIN SODIUM 1 G/3ML
INJECTION, POWDER, FOR SOLUTION INTRAMUSCULAR; INTRAVENOUS
Status: DISCONTINUED | OUTPATIENT
Start: 2024-09-23 | End: 2024-09-23

## 2024-09-23 RX ORDER — SODIUM CHLORIDE 9 MG/ML
500 INJECTION, SOLUTION INTRAVENOUS CONTINUOUS
Status: DISCONTINUED | OUTPATIENT
Start: 2024-09-23 | End: 2024-09-23 | Stop reason: HOSPADM

## 2024-09-23 RX ORDER — ROCURONIUM BROMIDE 10 MG/ML
INJECTION, SOLUTION INTRAVENOUS
Status: DISCONTINUED | OUTPATIENT
Start: 2024-09-23 | End: 2024-09-23

## 2024-09-23 RX ORDER — LIDOCAINE HYDROCHLORIDE 20 MG/ML
INJECTION INTRAVENOUS
Status: DISCONTINUED | OUTPATIENT
Start: 2024-09-23 | End: 2024-09-23

## 2024-09-23 RX ORDER — GLUCAGON 1 MG
1 KIT INJECTION
Status: DISCONTINUED | OUTPATIENT
Start: 2024-09-23 | End: 2024-09-23 | Stop reason: HOSPADM

## 2024-09-23 RX ORDER — ONDANSETRON HYDROCHLORIDE 2 MG/ML
4 INJECTION, SOLUTION INTRAVENOUS DAILY PRN
Status: DISCONTINUED | OUTPATIENT
Start: 2024-09-23 | End: 2024-09-23 | Stop reason: HOSPADM

## 2024-09-23 RX ADMIN — FENTANYL CITRATE 50 MCG: 50 INJECTION, SOLUTION INTRAMUSCULAR; INTRAVENOUS at 09:09

## 2024-09-23 RX ADMIN — PROPOFOL 120 MG: 10 INJECTION, EMULSION INTRAVENOUS at 09:09

## 2024-09-23 RX ADMIN — PHENYLEPHRINE HYDROCHLORIDE 100 MCG: 10 INJECTION INTRAVENOUS at 11:09

## 2024-09-23 RX ADMIN — MIDAZOLAM HYDROCHLORIDE 1 MG: 1 INJECTION, SOLUTION INTRAMUSCULAR; INTRAVENOUS at 09:09

## 2024-09-23 RX ADMIN — EPHEDRINE SULFATE 5 MG: 50 INJECTION INTRAVENOUS at 11:09

## 2024-09-23 RX ADMIN — ROCURONIUM BROMIDE 50 MG: 10 INJECTION INTRAVENOUS at 09:09

## 2024-09-23 RX ADMIN — FENTANYL CITRATE 25 MCG: 50 INJECTION INTRAMUSCULAR; INTRAVENOUS at 02:09

## 2024-09-23 RX ADMIN — SUGAMMADEX 200 MG: 100 INJECTION, SOLUTION INTRAVENOUS at 12:09

## 2024-09-23 RX ADMIN — SODIUM CHLORIDE, SODIUM GLUCONATE, SODIUM ACETATE, POTASSIUM CHLORIDE, MAGNESIUM CHLORIDE, SODIUM PHOSPHATE, DIBASIC, AND POTASSIUM PHOSPHATE: .53; .5; .37; .037; .03; .012; .00082 INJECTION, SOLUTION INTRAVENOUS at 12:09

## 2024-09-23 RX ADMIN — SODIUM CHLORIDE: 0.9 INJECTION, SOLUTION INTRAVENOUS at 09:09

## 2024-09-23 RX ADMIN — LIDOCAINE HYDROCHLORIDE 100 MG: 20 INJECTION INTRAVENOUS at 09:09

## 2024-09-23 RX ADMIN — ONDANSETRON 4 MG: 2 INJECTION INTRAMUSCULAR; INTRAVENOUS at 10:09

## 2024-09-23 RX ADMIN — FENTANYL CITRATE 50 MCG: 50 INJECTION, SOLUTION INTRAMUSCULAR; INTRAVENOUS at 10:09

## 2024-09-23 RX ADMIN — CEFAZOLIN 2 G: 330 INJECTION, POWDER, FOR SOLUTION INTRAMUSCULAR; INTRAVENOUS at 10:09

## 2024-09-23 NOTE — TRANSFER OF CARE
Anesthesia Transfer of Care Note    Patient: Vivienne Jose    Procedure(s) Performed: Procedure(s) (LRB):  T12 and L1 SpineJack ([place]) (Bilateral)    Patient location: PACU    Anesthesia Type: general    Transport from OR: Transported from OR on 6-10 L/min O2 by face mask with adequate spontaneous ventilation    Post pain: adequate analgesia    Post assessment: no apparent anesthetic complications and tolerated procedure well    Post vital signs: stable    Level of consciousness: awake    Nausea/Vomiting: no nausea/vomiting    Complications: none    Transfer of care protocol was followed      Last vitals: Visit Vitals  BP (!) 119/57 (BP Location: Right arm, Patient Position: Lying)   Pulse 80   Temp 36.5 °C (97.7 °F) (Temporal)   Resp 16   LMP  (LMP Unknown)   SpO2 95%   Breastfeeding No

## 2024-09-23 NOTE — OP NOTE
PROCEDURE PERFORMED: bilateral  Transpedicular SpineJack Vertebral Augmentation at the  T12 and L1 level(s)    Patient Name: Vivienne Jose  MRN: [id number]    PROCEDURE DATE: 9/23/2024    PREPROCEDURE DIAGNOSIS:                                                      1.  Age related osteoporosis with current pathological fracture, T12 and X0cbxcj(s), delayed healing        2.  Pain at the levels of compression fracture.                            3.  No myelopathy or retropulsed fragments.                                                                                                             POSTPROCEDURE DIAGNOSIS:   Same    CPT: 82899 (Percutaneous vertebral augmentation, including cavity creation - Thoracic 1st level), 01047 (Percutaneous vertebral augmentation, including cavity creation - Lumbar 1st level), 04307 (each additional thoracic or lumbar level)    SURGEON:  Myron Ocasio DO     ASSISTANT: Dr. Casey Perry                                                                                                                               ANESTHESIA: MAC/General Sedation.  See Anesthesia Records.                                                                                    LOCAL ANESTHETIC USED:  Lidocaine 2% at each side of vertebra, 5-10 ml at each level + Bupivacaine 0.25% 5ml at each pedicle    ESTIMATED BLOOD LOSS: 5-10cc                    DRAINS: None  BONE MARROW BIOPSY:  none  URINE OUTPUT: Not Measured  COMPLICATIONS: None  OUTCOME: Good    PREOPERATIVE ANTIBIOTICS: 2gIV  Ancef given 30 minutes prior to incision, see anesthesia record for time.    Informed Consent:  The patient's condition and proposed procedures, risks (including complications of nerve damage,  bleeding, infection, and failure of pain relief), and alternatives were discussed with the patient or responsible party.  The patient's/responsible party's questions were answered.  The patient/responsible party appeared to  understand and chose to proceed.  Informed consent was obtained.                                TECHNIQUE: A time out was taken to identify patient, procedure and side, and allergies were reviewed. With the patient lying in the prone position and after receiving the intravenous sedation, the area was prepped and draped using the usual sterile fashion, using ChloraPrep Duraprep, Ioban and a body fenestrated drape.   The image intensifier (C-arm) was brought into position and using fluoroscopy in AP and lateral views, the T12 target pedicles were identified and marked with a skin marker. Local anesthetic was given with a 27-gauge 1.5 inch needle.  That was followed with completing the local anesthetic injection with a 3.5inch 22-gauge spinal needle going down all the way to the periosteum of the desired pedicle. A 5-8mm longitudinal incision was made around the 22-gauge spinal needle using a #10 or #11 scapel to allow introduction of the 11 gauge trocar and cannula to the vertebral body.     Through a transpedicular approach, the trocar and cannula were introduced and advanced slowly.  Positioning was confirmed on the AP and lateral plane. Once in the posterior one-third of the vertebral body, through the cannula, a guidepin was inserted through the cannula and advanced under fluoroscopic guidance into the vertebral body near the anterior cortex, bilateral. This was followed with a bone reamer which was used to expand the access to make room for the template.  After the reamer was advanced to the anterior third, the template was inserted and rotated several times to smooth out the bone.  This was completed on both sides.  Next a 5.0mm SpineJack mechanical augmentation device was inserted under live fluoroscopy and postioning was checked on the AP and lateral.  In the AP view, the SpineJack was confirmed to have the skis in a parallel position to the endplates, and were slowly opened in the AP view to ensure they would  open in the proper orientation.  After 3-6 turns in the AP orientation, the fluoroscopy was switched to the lateral view and the skis were advanced until they either stopped expanding or the fracture was opened to a sufficient degree.  The trochar was removed and the methylmethacrylate trochar was inserted.     The methylmethacrylate resin was mixed and approximately 3-4ml was injected between the two sides. No escape was observed into the surrounding tissue while introducing the cement and this was done under live fluoroscopy.  The cannula was applied to each trocar under live fluoroscopy as well.  Each trocar and cannula was removed under live fluoroscopy in a very slow fashion to observe the contrast-impregnated cement. After the trocars were removed, the sites were cleaned and dried.     This processes was then repeated at the L1 level     A single 2-0 silk suture was then used to close the incisions with an interrupted stitch. The patient laid for approximately 10 minutes in a prone position post cement injection. The patient was then turned supine, monitored briefly and returned to the floor. In the recovery area, the patient layed supine for 60min after the procedure. The patient was monitored after the procedure with no change in neuromuscular status. The patient was given post-procedure and discharge instructions at home.  Instruction regarding bandage were given.  The patient was advised to call if developing any severe pain neurologic changes. Otherwise the patient will follow up with us in 1 week at our clinic.    Myron Wang  9/23/2024

## 2024-09-23 NOTE — ANESTHESIA PROCEDURE NOTES
Intubation    Date/Time: 9/23/2024 9:59 AM    Performed by: Roopa Sood CRNA  Authorized by: Javy Gomez MD    Intubation:     Induction:  Intravenous    Intubated:  Postinduction    Mask Ventilation:  Easy with oral airway    Attempts:  1    Attempted By:  CRNA    Method of Intubation:  Video laryngoscopy    Blade:  Kamara 3    Laryngeal View Grade: Grade I - full view of cords      Difficult Airway Encountered?: No      Complications:  None    Airway Device:  Oral endotracheal tube    Airway Device Size:  7.0    Style/Cuff Inflation:  Cuffed (inflated to minimal occlusive pressure)    Tube secured:  22    Secured at:  The lips    Placement Verified By:  Capnometry    Complicating Factors:  None    Findings Post-Intubation:  BS equal bilateral and atraumatic/condition of teeth unchanged

## 2024-09-23 NOTE — PLAN OF CARE
Pt arrived to recovery dosc via stretcher per OR team. Bedside report received. Pt attached to bedside monitor. VSS. Pt _sedated, moans__ post procedure. Pt on _6___L of oxygen via simple face mask___; oxygen sats _97___%. Patient to remain supine for 1 hour post op, unable to assess incision at this time.   Pt IV access infusing with NS. Will continue to monitor.

## 2024-09-23 NOTE — ANESTHESIA PREPROCEDURE EVALUATION
09/23/2024  Vivienne Jose is a 74 y.o., female.      Pre-op Assessment    I have reviewed the Patient Summary Reports.     I have reviewed the Nursing Notes. I have reviewed the NPO Status.   I have reviewed the Medications.     Review of Systems  Anesthesia Hx:             Denies Family Hx of Anesthesia complications.    Denies Personal Hx of Anesthesia complications.                    Cardiovascular:        CAD                                        Endocrine:  Diabetes               Physical Exam  General: Well nourished    Airway:  Mallampati: II   Mouth Opening: Normal  TM Distance: Normal    Chest/Lungs:  Normal Respiratory Rate    Heart:  Rate: Normal    Anesthesia Plan  Type of Anesthesia, risks & benefits discussed:    Anesthesia Type: Gen ETT  Intra-op Monitoring Plan: Standard ASA Monitors  Post Op Pain Control Plan: multimodal analgesia  Induction:  IV  Airway Plan: Video  Informed Consent: Informed consent signed with the Patient and all parties understand the risks and agree with anesthesia plan.  All questions answered.   ASA Score: 3  Day of Surgery Review of History & Physical: H&P Update referred to the surgeon/provider.    Ready For Surgery From Anesthesia Perspective.   .

## 2024-09-23 NOTE — ANESTHESIA POSTPROCEDURE EVALUATION
Anesthesia Post Evaluation    Patient: Vivienne Jose    Procedure(s) Performed: Procedure(s) (LRB):  T12 and L1 SpineJack ([place]) (Bilateral)    Final Anesthesia Type: general      Patient location during evaluation: PACU  Patient participation: Yes- Able to Participate  Level of consciousness: awake and alert  Post-procedure vital signs: reviewed and stable  Pain management: adequate  Airway patency: patent    PONV status at discharge: No PONV  Anesthetic complications: no      Cardiovascular status: stable  Respiratory status: spontaneous ventilation  Hydration status: euvolemic  Follow-up not needed.          Vitals Value Taken Time   /63 09/23/24 1402   Temp 36.7 °C (98.1 °F) 09/23/24 1246   Pulse 75 09/23/24 1411   Resp 12 09/23/24 1411   SpO2 91 % 09/23/24 1411   Vitals shown include unfiled device data.      Event Time   Out of Recovery 13:15:00         Pain/Gordo Score: Pain Rating Prior to Med Admin: 10 (9/23/2024  2:05 PM)  Gordo Score: 9 (9/23/2024  1:15 PM)

## 2024-09-23 NOTE — PLAN OF CARE
Pt in preop bay 41, VSS, and IV inserted. Pt denies any open wounds on body. Last used asa 9/13. Pt needs updated H&P, anesthesia consents signed, otherwise ready to roll.    Procedural consents verified with pt.

## 2024-09-23 NOTE — PLAN OF CARE
Discharge instructions given to patient and _spouse_ with verbalization of understanding all instructions.  Prescription medications ready to be picked up at pharmacy.   VSS, denies n/v and tolerating PO, rates pain level tolerable, IV removed, and family available for patient discharge home.

## 2024-09-23 NOTE — DISCHARGE INSTRUCTIONS
[facility] [place]  STAT-Diagnosticaaging service # [phone]    KYPHOPLASTY POST-PROCEDURE INSTRUCTIONS:    **If you do not have a follow up schedule please send a message to my office and have one scheduled for 1-2 weeks after the procedure**    Fractures in the bones of the spine (vertebrae) can cause severe back pain and loss of movement. You had a procedure called kyphoplasty to cement the fractures in your spine, restore the height of the vertebrae, and help relieve pain. Using image-guided X-rays, your doctor made 1 or 2 small cuts (incisions) in your back for each vertebra treated. The doctor put a balloon on each side of the broken vertebra and inflated the balloons until they expanded the right amount. Then the balloons were removed. The spaces created by the balloons were filled with orthopedic cement. This gave strength and stability to your vertebra. The following are instructions to help you care for your back when you are at home.    Risks and complications  Kyphoplasty is considered safe. If complications do happen, they may include the following:  Nerve damage  Cement leakage  Heart or lung problems  New or unrelieved back pain  Infection    When to call your healthcare provider  Call your healthcare provider if you have any of these symptoms:  Fever of 100.4°F (38.0°C) or higher  New pain, weakness, tingling, or numbness in your legs  New or unrelieved back pain      When to seek medical attention  Call 911 right away if you have any of the following:  Chest pain  Severe abdominal pain  Shortness of breath  Otherwise, call your doctor right away if you have any of the following:  Increased redness, swelling, drainage, or warmth around the incision sites  Severe pain at the incision site  Weakness, numbness, or tingling in your legs  Fever above 100.4°F  (38.0°C) or shaking chills   changes in bowel and/or bladder function: urinating or defecating on  yourself and not knowing that you did it.     Home care  Take your medicine exactly as directed.  You can take any pain medications you were previously taking (except NSAIDs which should wait 24 hours).  Wait 24 hours to restart any blood thinners.  Minimize your activity for the next 24 hours.  You can get up to go to the bathroom or walk around the house, but try to take it very easy during this time.  Leave your bandages on until they fall off by themselves.  If the bandage falls off before your follow up appointment, apply a small amount of triple antibiotics ointment and a fresh Band-Aid until the incision has completely closed.  If a suture was placed to close the incision, it will be removed at your first follow up appointment.  Dont shower for 1 day. Avoid soaking in water for 2 weeks.  Use an ice pack or bag of frozen peas--or something similar--wrapped in a thin towel to reduce the swelling and pain around incision sites. Put the ice pack on the area for 20 minutes, then remove it for 20 minutes. Repeat as needed.  Dont drive for 24 hours after surgery. And never drive while taking opioid pain medicine.  Ask your healthcare provider when you can begin lifting objects again. We ask that you limit yourself to 10 lbs for the first 2 weeks.  Avoid lifting objects overhead for 2 weeks.  Do not engage in strenuous activity (e.g., lifting or pushing heavy objects or repeated bending) until you discussed it with your physician at follow up.  Avoid bending from the waist because this strains the back muscles. Instead, bend your knees or squat to  objects from below the waist.  Avoid sitting in soft chairs. Use a firm chair with a straight back, which gives better support.  Eat a normal diet. If you have stomach upset, try to eat bland and low-fat foods such as rice, toast, broiled chicken, and yogurt.  If you experience an extreme headache that is only relieved when lying flat, please contact your  provider.   Do not apply direct heat (heating pad or heat packs) to the procedure site for at least 1 week.      Recovery Time:  Pain decreases rapidly and you will barely need pain medicines in two weeks.  Many people start feeling significant better within a few days.  Most patients are able to do gentle daily activities in a week.     IF AT ANY POINT YOU ARE VERY CONCERNED ABOUT YOUR SYMPTOMS, PLEASE GO TO THE EMERGENCY ROOM.    If you develop worsening pain, weakness, numbness, lose bowel or bladder control (i.e., having an accident where you did not even know you had to go to the bathroom and suddenly noticed you soiled yourself), saddle anesthesia (a loss of sensation restricted to the area of the buttocks, anus and between the legs -- i.e., those parts of your body that would touch a saddle if you were sitting on one) you need to go immediately to the emergency department for evaluation and treatment.    ----------------------------------------------------------------------------------------------------------------------------------------------------------------  POST SEDATION INSTRUCTIONS    Today you received intravenous medication (also known as sedation) that was used to help you relax and/or decrease discomfort during your procedure. This medication will be acting in your body for the next 24 hours, so you might feel a little tired or sleepy. This feeling will slowly wear off.   Common side effects associated with these medications include: drowsiness, dizziness, sleepiness, confusion, feeling excited, difficulty remembering things, lack of steadiness with walking or balance, loss of fine muscle control, slowed reflexes, difficulty focusing, and blurred vision.  Some over-the-counter and prescription medications (e.g., muscle relaxants, opioids, mood-altering medications, sedatives/hypnotics, antihistamines) can interact with the intravenous medication you received and cause an increased risk of the side  effects listed above in addition to other potentially life threatening side effects. Use extreme caution if you are taking such medications, and consult with your Pain Physician or prescribing physician if you have any questions.  For the next 12-24 hours:    DO NOT--Drive a car, operate machinery or power tools   DO NOT--Drink any alcoholic beverages (not even beer), they may dangerously increase the risk of side effects.    DO NOT--Make any important legal or business decisions or sign important documents.  We advise you to have someone to assist you at home. Move slowly and carefully. Do not make sudden changes in position. Be aware of dizziness or light-headedness and move accordingly.   If you seek medical treatment within 24 hours, let the nurse or doctor caring for you know that you have received the above medications. If you have any questions or concerns related to your sedation or treatment today please contact us.

## 2024-09-23 NOTE — DISCHARGE SUMMARY
[facility]  Discharge Note  Short Stay    Procedure(s) (LRB):  T12 and L1 SpineJack ([place]) (Bilateral)      OUTCOME: Patient tolerated treatment/procedure well without complication and is now ready for discharge.    DISPOSITION: Home or Self Care    FINAL DIAGNOSIS:  <principal problem not specified>    FOLLOWUP: In clinic    DISCHARGE INSTRUCTIONS:  No discharge procedures on file.     TIME SPENT ON DISCHARGE: 10 minutes

## 2024-09-24 ENCOUNTER — TELEPHONE (OUTPATIENT)
Dept: PRIMARY CARE CLINIC | Facility: CLINIC | Age: 74
End: 2024-09-24
Payer: MEDICARE

## 2024-09-24 NOTE — TELEPHONE ENCOUNTER
Call to patient to follow-up post procedure-spinal david.     Patient reported pain is getting better. While sitting there is not pain, some pain when standing and having some muscle spasms with standing. Lower extremities reported to be much better with feeling/touch.    Reviewed s/s to report to physician on AVS. Also reviewed home care items on AVS. Encouraged patient to read thoroughly, especially regarding body movements and restrictions.     Patient has FU appt with Pain Mgt on 09/27/2024, stitches to be removed.     Patient to call clinic with any questions or concerns. .

## 2024-09-27 ENCOUNTER — OFFICE VISIT (OUTPATIENT)
Dept: PAIN MEDICINE | Facility: CLINIC | Age: 74
End: 2024-09-27
Payer: MEDICARE

## 2024-09-27 VITALS
HEART RATE: 75 BPM | SYSTOLIC BLOOD PRESSURE: 142 MMHG | HEIGHT: 62 IN | WEIGHT: 232.13 LBS | BODY MASS INDEX: 42.72 KG/M2 | DIASTOLIC BLOOD PRESSURE: 79 MMHG

## 2024-09-27 DIAGNOSIS — M80.88XA OSTEOPOROTIC COMPRESSION FRACTURE OF VERTEBRA, INITIAL ENCOUNTER: Primary | ICD-10-CM

## 2024-09-27 DIAGNOSIS — M47.816 LUMBAR SPONDYLOSIS: ICD-10-CM

## 2024-09-27 DIAGNOSIS — M51.36 DDD (DEGENERATIVE DISC DISEASE), LUMBAR: ICD-10-CM

## 2024-09-27 PROCEDURE — 99999 PR PBB SHADOW E&M-EST. PATIENT-LVL IV: CPT | Mod: PBBFAC,,, | Performed by: STUDENT IN AN ORGANIZED HEALTH CARE EDUCATION/TRAINING PROGRAM

## 2024-09-27 NOTE — PROGRESS NOTES
Chronic Pain - f/u    Referring Physician: No ref. provider found    Date: 09/27/2024     Re: Vivienne Jose  MR#: [id number]  YOB: 1950  Age: 74 y.o.    Chief Complaint:   Chief Complaint   Patient presents with    Suture / Staple Removal     **This note is dictated using the M*Modal Fluency Direct word recognition program. There are word recognition mistakes that are occasionally missed on review.**    ASSESSMENT: 74 y.o. year old female with lower back pain, consistent with     1. Osteoporotic compression fracture of vertebra, initial encounter        2. Lumbar spondylosis        3. DDD (degenerative disc disease), lumbar            PLAN:     Acute T12 and L1 osteoporotic compression fracture - improved  -The patient started getting severe back pain radiating to the groin after she bent to  her mom in early August.  At that time she was found to have new compression fractures at T12 and L1.  I suspect that the inflammation from the compression fracture is causing nerve root irritation which is causing the radicular component that we are seeing in the T12 and L1 distribution.  9/23/24 - T12 and L1 Spinejack - gen sed - ASA81 - stop Rybelsus - needs post-op meds - f/u on 9/27 - cleared to stop aspirin - excellent height restoration of vertebral body. No extravasation. - some improvement at POD#4  -sutures and dressing removed today. Incisions have healed up. No erythema/edema/bleeding    Lumbar radiculopathy from T12/L1 nerve root irritation  -suspect that this is causing the pain wrapping around to abdomen  -discussed possible ELANA in a month if this is still persisting    Osteoporosis   -continue with  [facility] [place] for osteoporosis treatment    Lumbar spondylosis  -severe facet arthropathy at bilateral L4-5 and L5-S1  -I suspect this was responsible for her chronic axial low back pain prior to her accident.  -consider MBB/RFA after healed from compression fracture    - RTC 3 months  - Counseled  patient regarding the importance of weight loss and activity modification and physical therapy.    The above plan and management options were discussed at length with patient. Patient is in agreement with the above and verbalized understanding. It will be communicated with the referring physician via electronic record, fax, or mail.  Lab/study reports reviewed were important and necessary because subsequent medical and treatment recommendations required review of the above lab/study reports. Images viewed/reviewed above were important and necessary because subsequent medical and treatment recommendations required review of the reviewed image(s).     Electronically signed by:  Myron Ocasio DO  09/27/2024    =========================================================================================================    SUBJECTIVE:  Interval History 9/27/2024:   Vivienne Jose is a 74 y.o. female presents to the clinic for follow up.  Since last visit the pain has has slightly improved.  The patient is s/p T12/L1 spinejack on 9/23 and the abdominal and back pain is improving. The muscle spasms have eased up a little.    The pain is located in the lower back area and radiates to the right side .  The pain is described as aching    At BEST  5/10   At WORST  8/10 on the WORST day.    On average pain is rated as 5/10.   Today the pain is rated as 3/10  Symptoms interfere with daily activity.   Exacerbating factors: Sitting, Standing, Laying, and Walking.    Mitigating factors medications.     Current pain medications: Hydrocodone (rare), calcitonin, tizanidine, gabapentin, duloxetine 20mg  Failed Pain Medications: prednisone, cannot take NSAIDs    Pain procedures:  9/23/24 - T12 and L1 Spinejack - gen sed - ASA81 - stop Rybelsus - needs post-op meds - f/u on 9/27 - cleared to stop aspirin - some impropvement @POD#4    Initial hx:  Vivienne Jose is a 74 y.o. female presents to the clinic for the evaluation of lower mid  "back  pain. The pain started years ago following no inciting event and symptoms have been worsening.  The patient states that she was trying to  her mother in the beginning of August and she felt a "snap" and then went to the ED and was found to have acute compression fracture at T12 and L1.  The back pain has gotten much worse since it started.    Her pain is actually located predominantly in the low back.  She does not have too much pain in the upper low back.  The pain seems to start in the low back and will wrap around to the abdomen.  This has gotten worse since the compression fracture.      Pain Description:    The pain is located in the lower back area and radiates to the stomach .    At BEST  2/10   At WORST  10/10 on the WORST day.    On average pain is rated as 5/10.   Today the pain is rated as 5/10  The pain is continuous.  The pain is described as aching and shooting.    Symptoms interfere with daily activity and sleeping.   Exacerbating factors: Sitting, Standing, Laying, Bending, Lifting, and Getting out of bed/chair.    Mitigating factors medications.   She reports six hours of sleep per night.    Physical Therapy/Home Exercise: No, not currently in physical therapy or home exercise program    Current Pain Medications:    - calcitonin, tizanidine, gabapentin, duloxetine 20mg    Failed Pain Medications:    - prednisone, cannot take NSAIDs    Pain Treatment Therapies:    Pain procedures: none  Physical Therapy: yes  Chiropractor: none  Acupuncture: none  TENS unit: none  Spinal decompression: none  Joint replacement: R TKA    Patient denies urinary incontinence and bowel incontinence and weakness  Patient denies any suicidal or homicidal ideations     report:  Reviewed and consistent with medication use as prescribed.    Imaging:   MRI Lumbar 09/2024:  FINDINGS:  Alignment: Grade 1 anterolisthesis of L4 on L5.     Vertebrae: Compression deformity of the T12 vertebral body with approximately " 30% height loss and 5 mm of associated retropulsion.  Additional mild compression deformity of the L1 vertebral body with minimal retropulsion eccentric to the right.  Extensive marrow edema throughout the T12 vertebral body and to a lesser extent L1 vertebral body.  Mild edema of the prevertebral soft tissues.  Trace fluid in the T11-T12 disc space, likely post-traumatic.  L1 vertebral body hemangioma.     Discs: Mild disc height loss of T11-T12, T12-L1 and L4-L5. no evidence for discitis.     Cord: No cord signal abnormality or evidence of arachnoiditis.  Conus terminates at L1-L2.     Degenerative findings:     T11-T12: Fracture level with 5 mm of retropulsion of the superior endplate of T12 with mild spinal canal stenosis.     T12-L1: Fracture level with minimal retropulsion eccentric to the right resulting in mild spinal canal stenosis.     L1-L2: Circumferential disc bulge and mild facet arthropathy.  No spinal canal stenosis or neural foraminal narrowing.     L2-L3: Circumferential disc bulge with right paracentral protrusion and mild facet arthropathy.  No spinal canal stenosis or neural foraminal narrowing.     L3-L4: Mild facet arthropathy.  No spinal canal stenosis or neural foraminal narrowing.     L4-L5: Uncovering of the intervertebral disc with circumferential bulge and severe facet arthropathy result in mild spinal canal stenosis with moderate effacement of the right lateral recess as well as moderate right, mild left neural foraminal narrowing.  Trace bilateral facet effusions noted.     L5-S1: Moderate facet arthropathy.  No spinal canal stenosis or neural foraminal narrowing.     Paraspinal muscles & soft tissues: Bilateral simple renal cysts.  Moderate atrophy of the paraspinal muscles.        9/5/2024    11:18 AM 6/9/2015     1:57 PM   Pain Disability Index (PDI)   Family/Home Responsibilities: 5 6   Recreation: 5 4   Occupation: 5 3   Sexual Behavior: 5 0   Self Care: 5 0   Life-Support  Activities: 5 0   Pain Disability Index (PDI) 35 14        Past Medical History:   Diagnosis Date    Allergy     Angio-edema     Arthritis     Cataract     bilateral - not removed    Cerebrovascular malformation     Cirrhosis 11/24/2020    Colon polyps     Diabetes mellitus, type 2     Diabetic peripheral neuropathy     Edema of both feet 8/19/2022    GERD (gastroesophageal reflux disease)     Herpes infection     Hypothyroidism     Kidney stones     Mild nonproliferative diabetic retinopathy of 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 edema associated with type 2 diabetes mellitus 4/18/2019    DEL RIO (nonalcoholic steatohepatitis) 8/19/2022    Nausea & vomiting 11/23/2015    Obesity, morbid     Ovarian cyst     Seizure disorder, focal motor     Sleep apnea     Type II or unspecified type diabetes mellitus with neurological manifestations, uncontrolled(250.62)     Urticaria      Past Surgical History:   Procedure Laterality Date    CARPAL TUNNEL RELEASE Right 2014    CATARACT EXTRACTION W/  INTRAOCULAR LENS IMPLANT Right 9/19/2023    Procedure: EXTRACTION, CATARACT, WITH IOL INSERTION;  Surgeon: Lyndon Ortiz MD;  Location: [facility];  Service: Ophthalmology;  Laterality: Right;    CATARACT EXTRACTION W/  INTRAOCULAR LENS IMPLANT Left 10/3/2023    Procedure: EXTRACTION, CATARACT, WITH IOL INSERTION;  Surgeon: Lyndon Ortiz MD;  Location: [facility];  Service: Ophthalmology;  Laterality: Left;    COLONOSCOPY      COLONOSCOPY N/A 9/13/2017    Procedure: COLONOSCOPY Golytely;  Surgeon: Gisela Wall MD;  Location: [facility];  Service: Endoscopy;  Laterality: N/A;    COLONOSCOPY N/A 9/9/2022    Procedure: COLONOSCOPY Extended Suprep;  Surgeon: Britt Jasso MD;  Location: [facility];  Service: Endoscopy;  Laterality: N/A;    CYST REMOVAL      skin; multiples    ESOPHAGOGASTRODUODENOSCOPY Left 10/15/2021    Procedure: EGD (ESOPHAGOGASTRODUODENOSCOPY);  Surgeon: Luis Fernando Taveras MD;  Location: [address]  FLR);  Service: Endoscopy;  Laterality: Left;  cirrhosis labwork am of procedure  last seizure 1973  COVID test on 10/12/21 at [facility]    ESOPHAGOGASTRODUODENOSCOPY N/A 3/10/2023    Procedure: EGD (ESOPHAGOGASTRODUODENOSCOPY);  Surgeon: Christa Caldera MD;  Location: [facility] (2ND FLR);  Service: Endoscopy;  Laterality: N/A;  Medically Urgent  cirrohsis-labs done on 2/9/23 (< 90 days)  New onset A-fib  3/1 instructions to portal-st  Precall complete- [place]    EXTRACORPOREAL SHOCK WAVE LITHOTRIPSY  2002    HYSTERECTOMY  1984    JOINT REPLACEMENT Right 03/06/2019    knee    KIDNEY STONE SURGERY      KNEE ARTHROPLASTY Right 3/6/2019    Procedure: ARTHROPLASTY, KNEE;  Surgeon: Pj Gamboa MD;  Location: [facility] OR;  Service: Orthopedics;  Laterality: Right;  Depuy (Stephen notified 2/21, CC)    KYPHOPLASTY, SPINE, LUMBAR Bilateral 9/23/2024    Procedure: T12 and L1 SpineJack ([place]);  Surgeon: Myron Ocasio DO;  Location: [facility] OR;  Service: Pain Management;  Laterality: Bilateral;    THYROIDECTOMY  1977         TONSILLECTOMY, ADENOIDECTOMY      TRIGGER FINGER RELEASE      TUBAL LIGATION       Social History     Socioeconomic History    Marital status:    Occupational History    Occupation: retired     Employer: [employer]   Tobacco Use    Smoking status: Never     Passive exposure: Never    Smokeless tobacco: Never   Substance and Sexual Activity    Alcohol use: No     Alcohol/week: 0.0 standard drinks of alcohol    Drug use: No    Sexual activity: Yes     Partners: Male     Social Determinants of Health     Financial Resource Strain: Low Risk  (8/9/2024)    Overall Financial Resource Strain (CARDIA)     Difficulty of Paying Living Expenses: Not hard at all   Food Insecurity: No Food Insecurity (8/9/2024)    Hunger Vital Sign     Worried About Running Out of Food in the Last Year: Never true     Ran Out of Food in the Last Year: Never true   Transportation Needs: No Transportation Needs  (8/9/2024)    PRAPARE - Transportation     Lack of Transportation (Medical): No     Lack of Transportation (Non-Medical): No   Physical Activity: Inactive (1/17/2024)    Exercise Vital Sign     Days of Exercise per Week: 0 days     Minutes of Exercise per Session: 0 min   Stress: Stress Concern Present (8/9/2024)    [employer] [employer] - Occupational Stress Questionnaire     Feeling of Stress : To some extent   Housing Stability: Low Risk  (8/9/2024)    Housing Stability Vital Sign     Unable to Pay for Housing in the Last Year: No     Homeless in the Last Year: No     Family History   Problem Relation Name Age of Onset    Skin cancer Mother      Macular degeneration Mother      Dementia Mother      Hypertension Mother      Cancer Father      Arthritis Father      Gout Father      No Known Problems Daughter Madisyn     Diabetes Daughter Rudy     Hypertension Daughter Rudy     Arthritis Daughter Arianna     Allergies Son Lyndon     Allergic rhinitis Son Lyndon     Glaucoma Maternal Aunt          Great Maternal Aunt    Leukemia Maternal Uncle      Amblyopia Neg Hx      Cataracts Neg Hx      Retinal detachment Neg Hx      Strabismus Neg Hx      Stroke Neg Hx      Thyroid disease Neg Hx      Kidney disease Neg Hx      Angioedema Neg Hx      Asthma Neg Hx      Atopy Neg Hx      Eczema Neg Hx      Immunodeficiency Neg Hx      Rhinitis Neg Hx      Urticaria Neg Hx         Review of patient's allergies indicates:   Allergen Reactions    Sulfa (sulfonamide antibiotics) Nausea Only    Ciprofloxacin     Januvia [sitagliptin]      abd pain    Lisinopril     Lotensin [benazepril]     Nexium [esomeprazole magnesium] Other (See Comments)     Gas       Current Outpatient Medications   Medication Sig    abaloparatide (TYMLOS) 80 mcg (3,120 mcg/1.56 mL) PnIj Inject 0.04 mLs (80 mcg total) into the skin once daily.    ascorbic acid, vitamin C, (VITAMIN C) 100 MG tablet Take 500 mg by mouth 2 (two) times  daily.     aspirin (ECOTRIN) 81 MG EC tablet Take 81 mg by mouth once daily.    betamethasone dipropionate (DIPROLENE) 0.05 % ointment Apply topically 2 (two) times daily. Use to affected areas for up to 2 weeks then take a 1 week break or decrease to 3 times weekly. Do not apply to groin or face. Use to spot on ear when flaring    blood sugar diagnostic (TRUE METRIX GLUCOSE TEST STRIP) Strp TEST TWO TIMES DAILY    blood-glucose meter Misc [employer] True Metrix Air meter    calcitonin, salmon, (FORTICAL) 200 unit/actuation nasal spray 1 spray by Nasal route once daily.    DULoxetine (CYMBALTA) 20 MG capsule Take 1 capsule (20 mg total) by mouth once daily.    estradioL (ESTRING) 2 mg (7.5 mcg /24 hour) vaginal ring Remove old Estring, place the new one every 3 months.    fluticasone propionate (FLONASE) 50 mcg/actuation nasal spray 2 sprays (100 mcg total) by Each Nostril route once daily.    furosemide (LASIX) 20 MG tablet Take 1 tablet (20 mg total) by mouth every other day.    gabapentin (NEURONTIN) 600 MG tablet Take 1 tablet (600 mg total) by mouth 2 (two) times daily.    glipiZIDE (GLUCOTROL) 5 MG tablet TAKE 1 TABLET TWICE DAILY BEFORE MEALS    HYDROcodone-acetaminophen (NORCO) 5-325 mg per tablet Take 1 tablet by mouth every 6 (six) hours as needed for Pain (post surgical pain).    insulin degludec (TRESIBA FLEXTOUCH U-100) 100 unit/mL (3 mL) insulin pen Inject 32 Units into the skin once daily.    ipratropium (ATROVENT) 42 mcg (0.06 %) nasal spray 2 sprays by Each Nostril route 4 (four) times daily.    ketotifen (ZADITOR) 0.025 % (0.035 %) ophthalmic solution Place 1-2 drops into both eyes once daily.    L.acid-B.bifidum-B.animal-FOS 25 billion cell -100 mg Cap Take 1 capsule by mouth once daily.    lancets (TRUEPLUS LANCETS) 28 gauge [facility] Inject 1 lancet into the skin 2 (two) times daily before meals.    levothyroxine (SYNTHROID) 75 MCG tablet TAKE 1 TABLET EVERY DAY    loratadine (CLARITIN) 10 mg tablet Take  "10 mg by mouth once daily.    lovastatin (MEVACOR) 40 MG tablet TAKE 1 TABLET NIGHTLY (SUBSTITUTED FOR MEVACOR)    metFORMIN (GLUCOPHAGE) 1000 MG tablet TAKE 1 TABLET TWICE DAILY WITH MEALS    mv-mn/folic acid/vit [id number] (ALIVE ONCE DAILY WOMEN 50 PLUS ORAL) Take 1 tablet by mouth once daily.    nirmatrelvir-ritonavir (PAXLOVID) 300 mg (150 mg x 2)-100 mg copackaged tablets (EUA) Take 3 tablets by mouth 2 (two) times daily. Each dose contains 2 nirmatrelvir (pink tablets) and 1 ritonavir (white tablet). Take all 3 tablets together    omeprazole (PRILOSEC) 20 MG capsule TAKE 1 CAPSULE BY MOUTH DAILY AS NEEDED (ACID REFLUX).    pen needle, diabetic (Enovex ULTRA-FINE EDUARDO PEN NEEDLE) 32 gauge x 5/32" Ndle USE AS DIRECTED    semaglutide (OZEMPIC) 0.25 mg or 0.5 mg (2 mg/3 mL) pen injector Inject 0.5 mg into the skin every 7 days.    spironolactone (ALDACTONE) 50 MG tablet Take 1 tablet (50 mg total) by mouth every other day.    tiZANidine (ZANAFLEX) 4 MG tablet Take 1 tablet (4 mg total) by mouth every 8 (eight) hours as needed (muscle tension).    UNABLE TO FIND Take 1 tablet by mouth 2 (two) times a day. medication name: Magnesium 400mg, Calcium 1000 mg, D3 15mcg, Zinc 15mg    valACYclovir (VALTREX) 500 MG tablet Take 1 tablet (500 mg total) by mouth once daily.    valsartan (DIOVAN) 160 MG tablet Take 0.5 tablets (80 mg total) by mouth once daily.    azelastine (ASTELIN) 137 mcg (0.1 %) nasal spray 2 sprays (274 mcg total) by Nasal route 2 (two) times daily.     Current Facility-Administered Medications   Medication    capsaicin-skin cleanser patch 4 patch       REVIEW OF SYSTEMS:    GENERAL:  No weight loss, malaise or fevers.  HEENT:   No recent changes in vision or hearing  NECK:  Negative for lumps, no difficulty with swallowing.  RESPIRATORY:  Negative for cough, wheezing or shortness of breath, patient denies any recent URI.  CARDIOVASCULAR:  Negative for chest pain, leg swelling or palpitations.  GI:  Negative " "for abdominal discomfort, blood in stools or black stools or change in bowel habits.  MUSCULOSKELETAL:  See HPI.  SKIN:  Negative for lesions, rash, and itching.  PSYCH:  No mood disorder or recent psychosocial stressors.  Patients sleep is not disturbed secondary to pain.  HEMATOLOGY/LYMPHOLOGY:  Negative for prolonged bleeding, bruising easily or swollen nodes.  Patient is not currently taking any anti-coagulants  NEURO:   No history of headaches, syncope, paralysis, seizures or tremors.  All other reviewed and negative other than HPI.    OBJECTIVE:    BP (!) 142/79 (BP Location: Left arm, Patient Position: Sitting)   Pulse 75   Ht 5' 2" (1.575 m)   Wt 105.3 kg (232 lb 2.3 oz)   LMP  (LMP Unknown)   BMI 42.46 kg/m²     PHYSICAL EXAMINATION:    GENERAL: Well appearing, in no acute distress, alert and oriented x3.  PSYCH:  Mood and affect appropriate.  SKIN: Skin color, texture, turgor normal, no rashes or lesions.  HEAD/FACE:  Normocephalic, atraumatic. Cranial nerves grossly intact.  CV: RRR with palpation of the radial artery.  PULM: CTAB. No evidence of respiratory difficulty, symmetric chest rise.  GI:  Soft and non-tender. Obese    BACK: limited by mobility  - No obvious deformity or signs of trauma, Normal lumbar lordotic curve  - Negative spinous process tenderness  - Positive paravertebral tenderness  - Positive pain to palpation over the facet joints of the lumbar spine.   - Positive QL / Iliac crest / Glut tenderness  - Slump test is Negative for radicular pain  - Slump test is Positive for back pain  - Supine Straight leg raising is Did not perform for radicular pain  - Supine Straight leg raising is Did not perform for back pain  - Lumbar ROM is diminished in Flexion with pain  - Lumbar ROM is diminished in Extension with pain  - Lumbar ROM is diminished in Lateral Flexion with pain    - Did not perform Sustained Hip Flexion test (for discogenic pain)  - Positive Altered Gait, Posture  - Axial " facet loading test Did not perform on the bilateral side(s)    SI Joint exam:  - Negative SI joint tenderness to palpation  - Seven's sign Did not perform  - Yeoman's Test: Did not perform for SI joint pain indicating anterior SI ligament involvement. Did not perform for anterior thigh pain/paresthesia which indicates femoral nerve stretch.  - Gaenslen's Test:Unable to Perform  - Finger Josephine's Sign:Negative  - SI compression test:Did not perform  - SI distraction test:Did not perform  - Thigh Thrust: Did not perform  - SI Thrust: Did not perform    MUSKULOSKELETAL:    EXTREMITIES:   Hip Exam:  - Log Roll Did not perform  - FADIR Did not perform  - Stinchfield Did not perform  - Hip Scour Did not perform  - GTB Tenderness Did not perform    NEUROLOGICAL EXAM:  MENTAL STATUS: A x O x 3, good concentration, speech is fluent and goal directed  MEMORY: recent and remote are intact  CN: CN2-12 grossly intact  MOTOR: 5/5 in all muscle groups of LE  DTRs: 0+ intact symmetric

## 2024-09-30 ENCOUNTER — PATIENT MESSAGE (OUTPATIENT)
Dept: HEPATOLOGY | Facility: CLINIC | Age: 74
End: 2024-09-30
Payer: MEDICARE

## 2024-09-30 ENCOUNTER — PATIENT MESSAGE (OUTPATIENT)
Dept: ORTHOPEDICS | Facility: CLINIC | Age: 74
End: 2024-09-30
Payer: MEDICARE

## 2024-09-30 ENCOUNTER — PATIENT MESSAGE (OUTPATIENT)
Dept: ALLERGY | Facility: CLINIC | Age: 74
End: 2024-09-30
Payer: MEDICARE

## 2024-10-01 ENCOUNTER — PATIENT MESSAGE (OUTPATIENT)
Dept: ADMINISTRATIVE | Facility: OTHER | Age: 74
End: 2024-10-01
Payer: MEDICARE

## 2024-10-01 ENCOUNTER — PATIENT MESSAGE (OUTPATIENT)
Dept: PRIMARY CARE CLINIC | Facility: CLINIC | Age: 74
End: 2024-10-01
Payer: MEDICARE

## 2024-10-01 DIAGNOSIS — E03.9 HYPOTHYROIDISM, UNSPECIFIED TYPE: ICD-10-CM

## 2024-10-01 RX ORDER — LEVOTHYROXINE SODIUM 75 UG/1
75 TABLET ORAL DAILY
Qty: 90 TABLET | Refills: 3 | Status: SHIPPED | OUTPATIENT
Start: 2024-10-01

## 2024-10-04 ENCOUNTER — CLINICAL SUPPORT (OUTPATIENT)
Dept: ALLERGY | Facility: CLINIC | Age: 74
End: 2024-10-04
Payer: MEDICARE

## 2024-10-04 DIAGNOSIS — J30.9 CHRONIC ALLERGIC RHINITIS: Primary | ICD-10-CM

## 2024-10-04 PROCEDURE — 99999 PR PBB SHADOW E&M-EST. PATIENT-LVL I: CPT | Mod: PBBFAC,HCNC,,

## 2024-10-04 NOTE — PROGRESS NOTES
Pt. Here today for maintenance split dose, will get 0.25 ml in left upper arm wait 30 minutes , then in left lower  arm will be given 0.25 ml and wait additional 30 minutes.        Pt. Did well today with split dose

## 2024-10-11 ENCOUNTER — TELEPHONE (OUTPATIENT)
Dept: HEPATOLOGY | Facility: CLINIC | Age: 74
End: 2024-10-11
Payer: MEDICARE

## 2024-10-11 ENCOUNTER — LAB VISIT (OUTPATIENT)
Dept: LAB | Facility: HOSPITAL | Age: 74
End: 2024-10-11
Attending: INTERNAL MEDICINE
Payer: MEDICARE

## 2024-10-11 DIAGNOSIS — K74.69 OTHER CIRRHOSIS OF LIVER: ICD-10-CM

## 2024-10-11 LAB
AFP SERPL-MCNC: 5.8 NG/ML (ref 0–8.4)
ALBUMIN SERPL BCP-MCNC: 3.3 G/DL (ref 3.5–5.2)
ALBUMIN SERPL BCP-MCNC: 3.3 G/DL (ref 3.5–5.2)
ALP SERPL-CCNC: 188 U/L (ref 55–135)
ALP SERPL-CCNC: 188 U/L (ref 55–135)
ALT SERPL W/O P-5'-P-CCNC: 19 U/L (ref 10–44)
ALT SERPL W/O P-5'-P-CCNC: 19 U/L (ref 10–44)
ANION GAP SERPL CALC-SCNC: 12 MMOL/L (ref 8–16)
ANION GAP SERPL CALC-SCNC: 12 MMOL/L (ref 8–16)
AST SERPL-CCNC: 28 U/L (ref 10–40)
AST SERPL-CCNC: 28 U/L (ref 10–40)
BASOPHILS # BLD AUTO: 0.04 K/UL (ref 0–0.2)
BASOPHILS NFR BLD: 0.5 % (ref 0–1.9)
BILIRUB DIRECT SERPL-MCNC: 0.2 MG/DL (ref 0.1–0.3)
BILIRUB SERPL-MCNC: 0.4 MG/DL (ref 0.1–1)
BILIRUB SERPL-MCNC: 0.4 MG/DL (ref 0.1–1)
BUN SERPL-MCNC: 10 MG/DL (ref 8–23)
BUN SERPL-MCNC: 10 MG/DL (ref 8–23)
CALCIUM SERPL-MCNC: 9.7 MG/DL (ref 8.7–10.5)
CALCIUM SERPL-MCNC: 9.7 MG/DL (ref 8.7–10.5)
CHLORIDE SERPL-SCNC: 104 MMOL/L (ref 95–110)
CHLORIDE SERPL-SCNC: 104 MMOL/L (ref 95–110)
CO2 SERPL-SCNC: 25 MMOL/L (ref 23–29)
CO2 SERPL-SCNC: 25 MMOL/L (ref 23–29)
CREAT SERPL-MCNC: 0.8 MG/DL (ref 0.5–1.4)
CREAT SERPL-MCNC: 0.8 MG/DL (ref 0.5–1.4)
DIFFERENTIAL METHOD BLD: ABNORMAL
EOSINOPHIL # BLD AUTO: 0.3 K/UL (ref 0–0.5)
EOSINOPHIL NFR BLD: 3.9 % (ref 0–8)
ERYTHROCYTE [DISTWIDTH] IN BLOOD BY AUTOMATED COUNT: 14 % (ref 11.5–14.5)
EST. GFR  (NO RACE VARIABLE): >60 ML/MIN/1.73 M^2
EST. GFR  (NO RACE VARIABLE): >60 ML/MIN/1.73 M^2
GLUCOSE SERPL-MCNC: 129 MG/DL (ref 70–110)
GLUCOSE SERPL-MCNC: 129 MG/DL (ref 70–110)
HCT VFR BLD AUTO: 39.7 % (ref 37–48.5)
HGB BLD-MCNC: 12.5 G/DL (ref 12–16)
IMM GRANULOCYTES # BLD AUTO: 0.05 K/UL (ref 0–0.04)
IMM GRANULOCYTES NFR BLD AUTO: 0.6 % (ref 0–0.5)
INR PPP: 5.5 (ref 0.8–1.2)
LYMPHOCYTES # BLD AUTO: 1.9 K/UL (ref 1–4.8)
LYMPHOCYTES NFR BLD: 22.1 % (ref 18–48)
MCH RBC QN AUTO: 32 PG (ref 27–31)
MCHC RBC AUTO-ENTMCNC: 31.5 G/DL (ref 32–36)
MCV RBC AUTO: 102 FL (ref 82–98)
MONOCYTES # BLD AUTO: 1 K/UL (ref 0.3–1)
MONOCYTES NFR BLD: 11.6 % (ref 4–15)
NEUTROPHILS # BLD AUTO: 5.2 K/UL (ref 1.8–7.7)
NEUTROPHILS NFR BLD: 61.3 % (ref 38–73)
NRBC BLD-RTO: 0 /100 WBC
PLATELET # BLD AUTO: 309 K/UL (ref 150–450)
PMV BLD AUTO: 10.2 FL (ref 9.2–12.9)
POTASSIUM SERPL-SCNC: 4.2 MMOL/L (ref 3.5–5.1)
POTASSIUM SERPL-SCNC: 4.2 MMOL/L (ref 3.5–5.1)
PROT SERPL-MCNC: 7.4 G/DL (ref 6–8.4)
PROT SERPL-MCNC: 7.4 G/DL (ref 6–8.4)
PROTHROMBIN TIME: 54.5 SEC (ref 9–12.5)
RBC # BLD AUTO: 3.91 M/UL (ref 4–5.4)
SODIUM SERPL-SCNC: 141 MMOL/L (ref 136–145)
SODIUM SERPL-SCNC: 141 MMOL/L (ref 136–145)
WBC # BLD AUTO: 8.5 K/UL (ref 3.9–12.7)

## 2024-10-11 PROCEDURE — 85610 PROTHROMBIN TIME: CPT | Mod: HCNC | Performed by: INTERNAL MEDICINE

## 2024-10-11 PROCEDURE — 82248 BILIRUBIN DIRECT: CPT | Mod: HCNC | Performed by: INTERNAL MEDICINE

## 2024-10-11 PROCEDURE — 82105 ALPHA-FETOPROTEIN SERUM: CPT | Mod: HCNC | Performed by: INTERNAL MEDICINE

## 2024-10-11 PROCEDURE — 80321 ALCOHOLS BIOMARKERS 1OR 2: CPT | Mod: HCNC | Performed by: INTERNAL MEDICINE

## 2024-10-11 PROCEDURE — 80053 COMPREHEN METABOLIC PANEL: CPT | Mod: HCNC | Performed by: INTERNAL MEDICINE

## 2024-10-11 PROCEDURE — 85025 COMPLETE CBC W/AUTO DIFF WBC: CPT | Mod: HCNC | Performed by: INTERNAL MEDICINE

## 2024-10-14 ENCOUNTER — TELEPHONE (OUTPATIENT)
Dept: HEPATOLOGY | Facility: CLINIC | Age: 74
End: 2024-10-14
Payer: MEDICARE

## 2024-10-14 ENCOUNTER — LAB VISIT (OUTPATIENT)
Dept: LAB | Facility: HOSPITAL | Age: 74
End: 2024-10-14
Attending: INTERNAL MEDICINE
Payer: MEDICARE

## 2024-10-14 DIAGNOSIS — R79.1 ELEVATED CLOTTING TIME: Primary | ICD-10-CM

## 2024-10-14 DIAGNOSIS — R79.1 ELEVATED CLOTTING TIME: ICD-10-CM

## 2024-10-14 LAB
CLINICAL BIOCHEMIST REVIEW: NORMAL
INR PPP: 1 (ref 0.8–1.2)
PLPETH BLD-MCNC: <10 NG/ML
POPETH BLD-MCNC: <10 NG/ML
PROTHROMBIN TIME: 11.1 SEC (ref 9–12.5)

## 2024-10-14 PROCEDURE — 36415 COLL VENOUS BLD VENIPUNCTURE: CPT | Performed by: INTERNAL MEDICINE

## 2024-10-14 PROCEDURE — 85610 PROTHROMBIN TIME: CPT | Mod: HCNC | Performed by: INTERNAL MEDICINE

## 2024-10-14 NOTE — TELEPHONE ENCOUNTER
---- Message from Dr Nell Christianson -----  Have Pt repeat pt/inr today.    Patient was contacted earlier and Labs drawn at around 2 pm today. Labs have noted yet.    Patient asked if she was on any new medications, that may cause bleeding? All I see is the aspirin?    Pt stated I am off the mobic and stopped the other medication that my have ibuprofen, aleve or naprosyn.    Pt stated I am taking Tymlos 80 mcg dose pen injectable to back daily.  Informed pt I will add to her med list and let Dr Christiansno know.

## 2024-10-14 NOTE — TELEPHONE ENCOUNTER
----- Message from Nell Christianson MD sent at 10/14/2024 12:35 PM CDT -----  Have pt repeat INR today

## 2024-10-18 ENCOUNTER — OFFICE VISIT (OUTPATIENT)
Dept: PRIMARY CARE CLINIC | Facility: CLINIC | Age: 74
End: 2024-10-18
Payer: MEDICARE

## 2024-10-18 VITALS
WEIGHT: 229.94 LBS | RESPIRATION RATE: 16 BRPM | HEIGHT: 62 IN | OXYGEN SATURATION: 97 % | SYSTOLIC BLOOD PRESSURE: 134 MMHG | DIASTOLIC BLOOD PRESSURE: 88 MMHG | BODY MASS INDEX: 42.31 KG/M2 | HEART RATE: 76 BPM

## 2024-10-18 DIAGNOSIS — M81.0 AGE-RELATED OSTEOPOROSIS WITHOUT CURRENT PATHOLOGICAL FRACTURE: ICD-10-CM

## 2024-10-18 DIAGNOSIS — I10 ESSENTIAL HYPERTENSION: ICD-10-CM

## 2024-10-18 DIAGNOSIS — Z23 NEED FOR INFLUENZA VACCINATION: ICD-10-CM

## 2024-10-18 DIAGNOSIS — N18.31 TYPE 2 DIABETES MELLITUS WITH STAGE 3A CHRONIC KIDNEY DISEASE, WITH LONG-TERM CURRENT USE OF INSULIN: Primary | ICD-10-CM

## 2024-10-18 DIAGNOSIS — Z79.4 TYPE 2 DIABETES MELLITUS WITH STAGE 3A CHRONIC KIDNEY DISEASE, WITH LONG-TERM CURRENT USE OF INSULIN: ICD-10-CM

## 2024-10-18 DIAGNOSIS — E11.22 TYPE 2 DIABETES MELLITUS WITH STAGE 3A CHRONIC KIDNEY DISEASE, WITH LONG-TERM CURRENT USE OF INSULIN: ICD-10-CM

## 2024-10-18 DIAGNOSIS — N18.31 TYPE 2 DIABETES MELLITUS WITH STAGE 3A CHRONIC KIDNEY DISEASE, WITH LONG-TERM CURRENT USE OF INSULIN: ICD-10-CM

## 2024-10-18 DIAGNOSIS — E11.22 TYPE 2 DIABETES MELLITUS WITH STAGE 3A CHRONIC KIDNEY DISEASE, WITH LONG-TERM CURRENT USE OF INSULIN: Primary | ICD-10-CM

## 2024-10-18 DIAGNOSIS — Z79.4 TYPE 2 DIABETES MELLITUS WITH STAGE 3A CHRONIC KIDNEY DISEASE, WITH LONG-TERM CURRENT USE OF INSULIN: Primary | ICD-10-CM

## 2024-10-18 PROBLEM — Z76.89 ENCOUNTER TO ESTABLISH CARE WITH NEW DOCTOR: Status: RESOLVED | Noted: 2024-07-12 | Resolved: 2024-10-18

## 2024-10-18 PROCEDURE — 99999 PR PBB SHADOW E&M-EST. PATIENT-LVL III: CPT | Mod: PBBFAC,HCNC,, | Performed by: STUDENT IN AN ORGANIZED HEALTH CARE EDUCATION/TRAINING PROGRAM

## 2024-10-18 RX ORDER — GLIPIZIDE 5 MG/1
TABLET ORAL
Qty: 180 TABLET | Refills: 1 | Status: SHIPPED | OUTPATIENT
Start: 2024-10-18

## 2024-10-18 RX ORDER — VALSARTAN 160 MG/1
160 TABLET ORAL DAILY
Start: 2024-10-18

## 2024-10-18 RX ORDER — METFORMIN HYDROCHLORIDE 1000 MG/1
1000 TABLET ORAL 2 TIMES DAILY WITH MEALS
Qty: 180 TABLET | Refills: 1 | Status: SHIPPED | OUTPATIENT
Start: 2024-10-18

## 2024-10-18 NOTE — PROGRESS NOTES
Clinic Note  10/18/2024      Subjective:       Patient ID:  Vivienne is a 74 y.o. female being seen for an established visit.    Chief Complaint: Follow-up    HPI  Vivienne Jose is a 74 y.o.  female who presents with HTN, type 2 DM, HLD, DEL RIO, hypothyroidism (s/p thyroidectomy due to cysts), nephrolithiasis, recurrent UTIs (sees dr. Cee), sleep apnea, neuropathy is here for a f/u visit. Did acp 2024  -spine procedure went well.   -her renal function is back to normal. Gfr >60. She cut back on sodas, might be drinking more water than before. Takes aldactone every other day and lasix daily.   -BP sometimes fluctuates. Maybe has to go back on aldactone daily.   -last UTI was August.     Review of Systems   Constitutional:  Negative for chills and fever.   HENT:  Negative for congestion.    Respiratory:  Negative for cough and shortness of breath.    Cardiovascular:  Negative for chest pain and palpitations.   Gastrointestinal:  Negative for abdominal pain, blood in stool, constipation and diarrhea.   Genitourinary:  Negative for dysuria and hematuria.   Neurological:  Negative for dizziness, weakness and headaches.       Medication List with Changes/Refills   Current Medications    ABALOPARATIDE (TYMLOS) 80 MCG (3,120 MCG/1.56 ML) PNIJ    Inject 0.04 mLs (80 mcg total) into the skin once daily.    ASCORBIC ACID, VITAMIN C, (VITAMIN C) 100 MG TABLET    Take 500 mg by mouth 2 (two) times daily.     ASPIRIN (ECOTRIN) 81 MG EC TABLET    Take 81 mg by mouth once daily.    AZELASTINE (ASTELIN) 137 MCG (0.1 %) NASAL SPRAY    2 sprays (274 mcg total) by Nasal route 2 (two) times daily.    BETAMETHASONE DIPROPIONATE (DIPROLENE) 0.05 % OINTMENT    Apply topically 2 (two) times daily. Use to affected areas for up to 2 weeks then take a 1 week break or decrease to 3 times weekly. Do not apply to groin or face. Use to spot on ear when flaring    BLOOD SUGAR DIAGNOSTIC (TRUE METRIX GLUCOSE TEST STRIP) STRP    TEST TWO TIMES  "DAILY    BLOOD-GLUCOSE METER MISC    [employer] True Metrix Air meter    CALCITONIN, SALMON, (FORTICAL) 200 UNIT/ACTUATION NASAL SPRAY    1 spray by Nasal route once daily.    DULOXETINE (CYMBALTA) 20 MG CAPSULE    Take 1 capsule (20 mg total) by mouth once daily.    ESTRADIOL (ESTRING) 2 MG (7.5 MCG /24 HOUR) VAGINAL RING    Remove old Estring, place the new one every 3 months.    FLUTICASONE PROPIONATE (FLONASE) 50 MCG/ACTUATION NASAL SPRAY    2 sprays (100 mcg total) by Each Nostril route once daily.    FUROSEMIDE (LASIX) 20 MG TABLET    Take 1 tablet (20 mg total) by mouth every other day.    GABAPENTIN (NEURONTIN) 600 MG TABLET    Take 1 tablet (600 mg total) by mouth 2 (two) times daily.    INSULIN DEGLUDEC (TRESIBA FLEXTOUCH U-100) 100 UNIT/ML (3 ML) INSULIN PEN    Inject 32 Units into the skin once daily.    IPRATROPIUM (ATROVENT) 42 MCG (0.06 %) NASAL SPRAY    2 sprays by Each Nostril route 4 (four) times daily.    KETOTIFEN (ZADITOR) 0.025 % (0.035 %) OPHTHALMIC SOLUTION    Place 1-2 drops into both eyes once daily.    L.ACID-B.BIFIDUM-B.ANIMAL-FOS 25 BILLION CELL -100 MG CAP    Take 1 capsule by mouth once daily.    LANCETS (TRUEPLUS LANCETS) 28 GAUGE MISC    Inject 1 lancet into the skin 2 (two) times daily before meals.    LEVOTHYROXINE (SYNTHROID) 75 MCG TABLET    Take 1 tablet (75 mcg total) by mouth once daily.    LORATADINE (CLARITIN) 10 MG TABLET    Take 10 mg by mouth once daily.    LOVASTATIN (MEVACOR) 40 MG TABLET    TAKE 1 TABLET NIGHTLY (SUBSTITUTED FOR MEVACOR)    MV-MN/FOLIC ACID/VIT [id number] (ALIVE ONCE DAILY WOMEN 50 PLUS ORAL)    Take 1 tablet by mouth once daily.    OMEPRAZOLE (PRILOSEC) 20 MG CAPSULE    TAKE 1 CAPSULE BY MOUTH DAILY AS NEEDED (ACID REFLUX).    PEN NEEDLE, DIABETIC (BD ULTRA-FINE EDUARDO PEN NEEDLE) 32 GAUGE X 5/32" NDLE    USE AS DIRECTED    SEMAGLUTIDE (OZEMPIC) 0.25 MG OR 0.5 MG (2 MG/3 ML) PEN INJECTOR    Inject 0.5 mg into the skin every 7 days.    SPIRONOLACTONE " "(ALDACTONE) 50 MG TABLET    Take 1 tablet (50 mg total) by mouth every other day.    TIZANIDINE (ZANAFLEX) 4 MG TABLET    Take 1 tablet (4 mg total) by mouth every 8 (eight) hours as needed (muscle tension).    UNABLE TO FIND    Take 1 tablet by mouth 2 (two) times a day. medication name: Magnesium 400mg, Calcium 1000 mg, D3 15mcg, Zinc 15mg    VALACYCLOVIR (VALTREX) 500 MG TABLET    Take 1 tablet (500 mg total) by mouth once daily.   Changed and/or Refilled Medications    Modified Medication Previous Medication    GLIPIZIDE (GLUCOTROL) 5 MG TABLET glipiZIDE (GLUCOTROL) 5 MG tablet       TAKE 1 TABLET TWICE DAILY BEFORE MEALS    TAKE 1 TABLET TWICE DAILY BEFORE MEALS    METFORMIN (GLUCOPHAGE) 1000 MG TABLET metFORMIN (GLUCOPHAGE) 1000 MG tablet       Take 1 tablet (1,000 mg total) by mouth 2 (two) times daily with meals.    TAKE 1 TABLET TWICE DAILY WITH MEALS    VALSARTAN (DIOVAN) 160 MG TABLET valsartan (DIOVAN) 160 MG tablet       Take 1 tablet (160 mg total) by mouth once daily.    Take 0.5 tablets (80 mg total) by mouth once daily.   Discontinued Medications    HYDROCODONE-ACETAMINOPHEN (NORCO) 5-325 MG PER TABLET    Take 1 tablet by mouth every 6 (six) hours as needed for Pain (post surgical pain).    NIRMATRELVIR-RITONAVIR (PAXLOVID) 300 MG (150 MG X 2)-100 MG COPACKAGED TABLETS (EUA)    Take 3 tablets by mouth 2 (two) times daily. Each dose contains 2 nirmatrelvir (pink tablets) and 1 ritonavir (white tablet). Take all 3 tablets together           Objective:      /88 (BP Location: Right arm, Patient Position: Sitting)   Pulse 76   Resp 16   Ht 5' 2" (1.575 m)   Wt 104.3 kg (229 lb 15 oz)   LMP  (LMP Unknown)   SpO2 97%   BMI 42.06 kg/m²   Estimated body mass index is 42.06 kg/m² as calculated from the following:    Height as of this encounter: 5' 2" (1.575 m).    Weight as of this encounter: 104.3 kg (229 lb 15 oz).  Physical Exam  Constitutional:       Appearance: Normal appearance.   Eyes:     "  Conjunctiva/sclera: Conjunctivae normal.   Neck:      Vascular: No carotid bruit.   Cardiovascular:      Rate and Rhythm: Normal rate and regular rhythm.      Pulses: Normal pulses.      Heart sounds: Normal heart sounds.   Pulmonary:      Effort: Pulmonary effort is normal. No respiratory distress.      Breath sounds: Normal breath sounds.   Musculoskeletal:      Right lower leg: No edema.      Left lower leg: No edema.   Skin:     General: Skin is warm.   Neurological:      Mental Status: She is alert and oriented to person, place, and time.   Psychiatric:         Mood and Affect: Mood normal.         Behavior: Behavior normal.           Assessment and Plan:     1. Type 2 diabetes mellitus with stage 3a chronic kidney disease, with long-term current use of insulin  Assessment & Plan:  Last A1c of 6.3. well controlled on on metformin 1000 mg bid, ozempic 0.5 mg weekly, glipizide 5 mg bid, tresiba 32 U daily. Being monitored by digital medicine. Continue, will monitor.     Orders:  -     metFORMIN (GLUCOPHAGE) 1000 MG tablet; Take 1 tablet (1,000 mg total) by mouth 2 (two) times daily with meals.  Dispense: 180 tablet; Refill: 1  -     glipiZIDE (GLUCOTROL) 5 MG tablet; TAKE 1 TABLET TWICE DAILY BEFORE MEALS  Dispense: 180 tablet; Refill: 1    2. Need for influenza vaccination  -     influenza (adjuvanted) (Fluad) 45 mcg/0.5 mL IM vaccine (> or = 66 yo) 0.5 mL    3. Essential hypertension  Assessment & Plan:  on valsartan 160 mg daily and aldactone 50 mg every other day. She is on digital medicine. Some days its in the 140s but she is taking aldactone every other day. will monitor for now. continue    Orders:  -     valsartan (DIOVAN) 160 MG tablet; Take 1 tablet (160 mg total) by mouth once daily.    4. Age-related osteoporosis without current pathological fracture  Assessment & Plan:  Patient saw shala bahena who started her on tymlos.continue.       5. Type 2 diabetes mellitus with stage 3a chronic kidney  disease, with long-term current use of insulin  Comments:   continue current medications and follow up with Endocrinology. F/u with optometry for eye exam  Assessment & Plan:  Last A1c of 6.3. well controlled on on metformin 1000 mg bid, ozempic 0.5 mg weekly, glipizide 5 mg bid, tresiba 32 U daily. Being monitored by digital medicine. Continue, will monitor.     Orders:  -     metFORMIN (GLUCOPHAGE) 1000 MG tablet; Take 1 tablet (1,000 mg total) by mouth 2 (two) times daily with meals.  Dispense: 180 tablet; Refill: 1  -     glipiZIDE (GLUCOTROL) 5 MG tablet; TAKE 1 TABLET TWICE DAILY BEFORE MEALS  Dispense: 180 tablet; Refill: 1          Follow Up:   Follow up in about 3 months (around 1/18/2025).        Nalini Hall

## 2024-10-18 NOTE — ASSESSMENT & PLAN NOTE
on valsartan 160 mg daily and aldactone 50 mg every other day. She is on digital medicine. Some days its in the 140s but she is taking aldactone every other day. will monitor for now. continue

## 2024-10-18 NOTE — ASSESSMENT & PLAN NOTE
Last A1c of 6.3. well controlled on on metformin 1000 mg bid, ozempic 0.5 mg weekly, glipizide 5 mg bid, tresiba 32 U daily. Being monitored by digital medicine. Continue, will monitor.

## 2024-10-22 ENCOUNTER — OFFICE VISIT (OUTPATIENT)
Dept: HEPATOLOGY | Facility: CLINIC | Age: 74
End: 2024-10-22
Payer: MEDICARE

## 2024-10-22 DIAGNOSIS — K74.60 HEPATIC CIRRHOSIS, UNSPECIFIED HEPATIC CIRRHOSIS TYPE, UNSPECIFIED WHETHER ASCITES PRESENT: Primary | ICD-10-CM

## 2024-10-22 DIAGNOSIS — R60.9 EDEMA, UNSPECIFIED TYPE: ICD-10-CM

## 2024-10-22 PROCEDURE — 1159F MED LIST DOCD IN RCRD: CPT | Mod: HCNC,CPTII,95, | Performed by: INTERNAL MEDICINE

## 2024-10-22 PROCEDURE — 3044F HG A1C LEVEL LT 7.0%: CPT | Mod: HCNC,CPTII,95, | Performed by: INTERNAL MEDICINE

## 2024-10-22 PROCEDURE — 99213 OFFICE O/P EST LOW 20 MIN: CPT | Mod: HCNC,95,, | Performed by: INTERNAL MEDICINE

## 2024-10-22 PROCEDURE — 4010F ACE/ARB THERAPY RXD/TAKEN: CPT | Mod: HCNC,CPTII,95, | Performed by: INTERNAL MEDICINE

## 2024-10-22 PROCEDURE — 3061F NEG MICROALBUMINURIA REV: CPT | Mod: HCNC,CPTII,95, | Performed by: INTERNAL MEDICINE

## 2024-10-22 PROCEDURE — 1157F ADVNC CARE PLAN IN RCRD: CPT | Mod: HCNC,CPTII,95, | Performed by: INTERNAL MEDICINE

## 2024-10-22 PROCEDURE — 3066F NEPHROPATHY DOC TX: CPT | Mod: HCNC,CPTII,95, | Performed by: INTERNAL MEDICINE

## 2024-10-22 PROCEDURE — 1160F RVW MEDS BY RX/DR IN RCRD: CPT | Mod: HCNC,CPTII,95, | Performed by: INTERNAL MEDICINE

## 2024-10-22 RX ORDER — FUROSEMIDE 20 MG/1
20 TABLET ORAL DAILY
Qty: 30 TABLET | Refills: 11 | Status: SHIPPED | OUTPATIENT
Start: 2024-10-22 | End: 2025-10-22

## 2024-10-22 NOTE — PATIENT INSTRUCTIONS
EGD 3/25 at [facility]  Continue lasix 20 mg daily and spironolactone 50 mg every other day  Monitor for HE  Abdo US 1/25 to screen for HCC with AFP  Labs every 3 months at [facility]  Return 02/25

## 2024-10-22 NOTE — PROGRESS NOTES
The patient location is: home  The chief complaint leading to consultation is: f/u cirrhosis and fatty liver    Visit type: audiovisual    Face to Face time with patient: 15 minutes  30 minutes of total time spent on the encounter, which includes face to face time and non-face to face time preparing to see the patient (eg, review of tests), Obtaining and/or reviewing separately obtained history, Documenting clinical information in the electronic or other health record, Independently interpreting results (not separately reported) and communicating results to the patient/family/caregiver, or Care coordination (not separately reported).         Each patient to whom he or she provides medical services by telemedicine is:  (1) informed of the relationship between the physician and patient and the respective role of any other health care provider with respect to management of the patient; and (2) notified that he or she may decline to receive medical services by telemedicine and may withdraw from such care at any time.    Notes: HEPATOLOGY FOLLOW UP    Referring Physician: Nalini Hall MD  Current Corresponding Physician: Nalini Hall MD, Hilda Odonnell MD    Vivienne Jose is here for follow up of compensated cirrhosis.    HPI  She is a 74 y.o. female with a PMHx of DM, HTN, CAD, hyperlipidemia, recurrent UTIs and obesity who had a renal stone CT and abdominal US (both 11/20) and was found to have a nodular liver c/w cirrhosis but no HCC. The patient does not drink alcohol heavily; there is no family history of chronic liver disease. HCV AB was negative in 2017. I saw her in consultation 11/25/20.    Interval HIstory  Since Vivienne Jose's last few visits:  --fractured T12/L1- s/p kyphoplasty 9/23/24; now on timlos for osteoporosis x 2 years  --lost 16 lbs during this time  --edema worsened- had to increase lasix to 20 mg daily; spironolactone continued every other day  --given duloxetine for neuropathy-  improved  --thought to have new a fib; eliquis recommended; ablation also recommended; however repeat holter-no evidence of a fib; eliquis not started  --    EGD 3/11/23: no varices  Edema, ongoing: continue lasix 20 mg daily and spironolactone 50 mg daily  Abdo US 7/9/24: cirrhosis- no HCC    Serologic w/u for CLD:  HCV Ab-, HBsAg-, HBcAb-, HBsAb-, HAV IgG+  CHRISTINA+ (1:1280), ASMA-,   Ferritin 38, iron sat 15%  Peth negative  Alpha 1 antitrypsin level 123    Labs 10/11/24: ALT 31, AST 34, ALKP 86, Tbil 0.5, albumin 3.3    MELD 3.0: 23 at 10/11/2024 10:23 AM  MELD-Na: 26 at 10/11/2024 10:23 AM  Calculated from:  Serum Creatinine: 0.8 mg/dL (Using min of 1 mg/dL) at 10/11/2024 10:23 AM  Serum Sodium: 141 mmol/L (Using max of 137 mmol/L) at 10/11/2024 10:23 AM  Total Bilirubin: 0.4 mg/dL (Using min of 1 mg/dL) at 10/11/2024 10:23 AM  Serum Albumin: 3.3 g/dL at 10/11/2024 10:23 AM  INR(ratio): 5.5 at 10/11/2024 10:23 AM  Age at listing (hypothetical): 74 years  Sex: Female at 10/11/2024 10:23 AM      Outpatient Encounter Medications as of 10/22/2024   Medication Sig Dispense Refill    abaloparatide (TYMLOS) 80 mcg (3,120 mcg/1.56 mL) PnIj Inject 0.04 mLs (80 mcg total) into the skin once daily. 1.56 mL 11    ascorbic acid, vitamin C, (VITAMIN C) 100 MG tablet Take 500 mg by mouth 2 (two) times daily.       aspirin (ECOTRIN) 81 MG EC tablet Take 81 mg by mouth once daily.      azelastine (ASTELIN) 137 mcg (0.1 %) nasal spray 2 sprays (274 mcg total) by Nasal route 2 (two) times daily. 120 mL 4    betamethasone dipropionate (DIPROLENE) 0.05 % ointment Apply topically 2 (two) times daily. Use to affected areas for up to 2 weeks then take a 1 week break or decrease to 3 times weekly. Do not apply to groin or face. Use to spot on ear when flaring 15 g 2    blood sugar diagnostic (TRUE METRIX GLUCOSE TEST STRIP) Strp TEST TWO TIMES DAILY 200 strip 6    blood-glucose meter Misc [employer] True Metrix Air meter 1 each 0     calcitonin, salmon, (FORTICAL) 200 unit/actuation nasal spray 1 spray by Nasal route once daily. 3.7 mL 0    DULoxetine (CYMBALTA) 20 MG capsule Take 1 capsule (20 mg total) by mouth once daily. 90 capsule 3    estradioL (ESTRING) 2 mg (7.5 mcg /24 hour) vaginal ring Remove old Estring, place the new one every 3 months. 1 each 3    fluticasone propionate (FLONASE) 50 mcg/actuation nasal spray 2 sprays (100 mcg total) by Each Nostril route once daily. 48 g 2    furosemide (LASIX) 20 MG tablet Take 1 tablet (20 mg total) by mouth every other day. 15 tablet 11    gabapentin (NEURONTIN) 600 MG tablet Take 1 tablet (600 mg total) by mouth 2 (two) times daily.      glipiZIDE (GLUCOTROL) 5 MG tablet TAKE 1 TABLET TWICE DAILY BEFORE MEALS 180 tablet 1    insulin degludec (TRESIBA FLEXTOUCH U-100) 100 unit/mL (3 mL) insulin pen Inject 32 Units into the skin once daily.      ipratropium (ATROVENT) 42 mcg (0.06 %) nasal spray 2 sprays by Each Nostril route 4 (four) times daily. 45 mL 4    ketotifen (ZADITOR) 0.025 % (0.035 %) ophthalmic solution Place 1-2 drops into both eyes once daily.      L.acid-B.bifidum-B.animal-FOS 25 billion cell -100 mg Cap Take 1 capsule by mouth once daily.      lancets (TRUEPLUS LANCETS) 28 gauge Misc Inject 1 lancet into the skin 2 (two) times daily before meals. 200 each 6    levothyroxine (SYNTHROID) 75 MCG tablet Take 1 tablet (75 mcg total) by mouth once daily. 90 tablet 3    loratadine (CLARITIN) 10 mg tablet Take 10 mg by mouth once daily.      lovastatin (MEVACOR) 40 MG tablet TAKE 1 TABLET NIGHTLY (SUBSTITUTED FOR MEVACOR) 90 tablet 3    metFORMIN (GLUCOPHAGE) 1000 MG tablet Take 1 tablet (1,000 mg total) by mouth 2 (two) times daily with meals. 180 tablet 1    mv-mn/folic acid/vit K/upok218 (ALIVE ONCE DAILY WOMEN 50 PLUS ORAL) Take 1 tablet by mouth once daily.      omeprazole (PRILOSEC) 20 MG capsule TAKE 1 CAPSULE BY MOUTH DAILY AS NEEDED (ACID REFLUX). 90 capsule 3    pen needle,  "diabetic (BD ULTRA-FINE EDUARDO PEN NEEDLE) 32 gauge x 5/32" Ndle USE AS DIRECTED 200 each 6    semaglutide (OZEMPIC) 0.25 mg or 0.5 mg (2 mg/3 mL) pen injector Inject 0.5 mg into the skin every 7 days. 12 mL 0    spironolactone (ALDACTONE) 50 MG tablet Take 1 tablet (50 mg total) by mouth every other day. 15 tablet 11    tiZANidine (ZANAFLEX) 4 MG tablet Take 1 tablet (4 mg total) by mouth every 8 (eight) hours as needed (muscle tension). 60 tablet 1    UNABLE TO FIND Take 1 tablet by mouth 2 (two) times a day. medication name: Magnesium 400mg, Calcium 1000 mg, D3 15mcg, Zinc 15mg      valACYclovir (VALTREX) 500 MG tablet Take 1 tablet (500 mg total) by mouth once daily. 90 tablet 3    valsartan (DIOVAN) 160 MG tablet Take 1 tablet (160 mg total) by mouth once daily.      [DISCONTINUED] glipiZIDE (GLUCOTROL) 5 MG tablet TAKE 1 TABLET TWICE DAILY BEFORE MEALS 180 tablet 1    [DISCONTINUED] levothyroxine (SYNTHROID) 75 MCG tablet TAKE 1 TABLET EVERY DAY 30 tablet 0    [DISCONTINUED] metFORMIN (GLUCOPHAGE) 1000 MG tablet TAKE 1 TABLET TWICE DAILY WITH MEALS 180 tablet 1    [DISCONTINUED] nirmatrelvir-ritonavir (PAXLOVID) 300 mg (150 mg x 2)-100 mg copackaged tablets (EUA) Take 3 tablets by mouth 2 (two) times daily. Each dose contains 2 nirmatrelvir (pink tablets) and 1 ritonavir (white tablet). Take all 3 tablets together 30 tablet 0    [DISCONTINUED] valsartan (DIOVAN) 160 MG tablet Take 0.5 tablets (80 mg total) by mouth once daily.       Facility-Administered Encounter Medications as of 10/22/2024   Medication Dose Route Frequency Provider Last Rate Last Admin    capsaicin-skin cleanser patch 4 patch  4 patch Topical (Top) 1 time in Clinic/HOD          Review of patient's allergies indicates:   Allergen Reactions    Sulfa (sulfonamide antibiotics) Nausea Only    Ciprofloxacin     Januvia [sitagliptin]      abd pain    Lisinopril     Lotensin [benazepril]     Nexium [esomeprazole magnesium] Other (See Comments)     " Gas     Past Medical History:   Diagnosis Date    Allergy     Angio-edema     Arthritis     Cataract     bilateral - not removed    Cerebrovascular malformation     Cirrhosis 11/24/2020    Colon polyps     Diabetes mellitus, type 2     Diabetic peripheral neuropathy     Edema of both feet 8/19/2022    GERD (gastroesophageal reflux disease)     Herpes infection     Hypothyroidism     Kidney stones     Mild nonproliferative diabetic retinopathy of 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 edema associated with type 2 diabetes mellitus 4/18/2019    DEL RIO (nonalcoholic steatohepatitis) 8/19/2022    Nausea & vomiting 11/23/2015    Obesity, morbid     Ovarian cyst     Seizure disorder, focal motor     Sleep apnea     Type II or unspecified type diabetes mellitus with neurological manifestations, uncontrolled(250.62)     Urticaria        Review of Systems   Constitutional: Negative.    HENT: Negative.     Eyes: Negative.    Respiratory: Negative.     Cardiovascular: Negative.    Gastrointestinal: Negative.    Genitourinary: Negative.    Musculoskeletal: Negative.    Skin: Negative.    Neurological: Negative.    Psychiatric/Behavioral: Negative.       There were no vitals filed for this visit.            MELD 3.0: 23 at 10/11/2024 10:23 AM  MELD-Na: 26 at 10/11/2024 10:23 AM  Calculated from:  Serum Creatinine: 0.8 mg/dL (Using min of 1 mg/dL) at 10/11/2024 10:23 AM  Serum Sodium: 141 mmol/L (Using max of 137 mmol/L) at 10/11/2024 10:23 AM  Total Bilirubin: 0.4 mg/dL (Using min of 1 mg/dL) at 10/11/2024 10:23 AM  Serum Albumin: 3.3 g/dL at 10/11/2024 10:23 AM  INR(ratio): 5.5 at 10/11/2024 10:23 AM  Age at listing (hypothetical): 74 years  Sex: Female at 10/11/2024 10:23 AM      Lab Results   Component Value Date     (H) 10/11/2024     (H) 10/11/2024    BUN 10 10/11/2024    BUN 10 10/11/2024    CREATININE 0.8 10/11/2024    CREATININE 0.8 10/11/2024    CALCIUM 9.7 10/11/2024    CALCIUM 9.7 10/11/2024     10/11/2024    NA  141 10/11/2024    K 4.2 10/11/2024    K 4.2 10/11/2024     10/11/2024     10/11/2024    PROT 7.4 10/11/2024    PROT 7.4 10/11/2024    CO2 25 10/11/2024    CO2 25 10/11/2024    ANIONGAP 12 10/11/2024    ANIONGAP 12 10/11/2024    WBC 8.50 10/11/2024    RBC 3.91 (L) 10/11/2024    HGB 12.5 10/11/2024    HCT 39.7 10/11/2024     (H) 10/11/2024    MCH 32.0 (H) 10/11/2024    MCHC 31.5 (L) 10/11/2024     Lab Results   Component Value Date    RDW 14.0 10/11/2024     10/11/2024    MPV 10.2 10/11/2024    GRAN 5.2 10/11/2024    GRAN 61.3 10/11/2024    LYMPH 1.9 10/11/2024    LYMPH 22.1 10/11/2024    MONO 1.0 10/11/2024    MONO 11.6 10/11/2024    EOSINOPHIL 3.9 10/11/2024    BASOPHIL 0.5 10/11/2024    EOS 0.3 10/11/2024    BASO 0.04 10/11/2024    APTT 25.6 07/27/2011    GROUPTRH O POS 11/29/2007    CHOL 130 03/08/2024    TRIG 102 03/08/2024    HDL 48 03/08/2024    CHOLHDL 36.9 03/08/2024    TOTALCHOLEST 2.7 03/08/2024    ALBUMIN 3.3 (L) 10/11/2024    ALBUMIN 3.3 (L) 10/11/2024    BILIDIR 0.2 10/11/2024    AST 28 10/11/2024    AST 28 10/11/2024    ALT 19 10/11/2024    ALT 19 10/11/2024    ALKPHOS 188 (H) 10/11/2024    ALKPHOS 188 (H) 10/11/2024    MG 1.9 02/09/2023    LABPROT 11.1 10/14/2024    INR 1.0 10/14/2024       Assessment and Plan:  Vivienne Jose is a 73 y.o. female with decompensated cirrhosis. Current recommendations:  1. Cirrhosis, decompensated: labs every 12 weeks; HCC screening in 6 months (01/25);   2. Recurrent UTIs: monitor  3. Edema: continue lasix 20 mg daily and aldactone 50 mg every other day;   4. Af fib: not diagnosed with afib; no need for eliquis  5. Portal HTN: repeat EGD 3/25  6. Hypotension on diuretics and antihypertensive- normal BP off these meds- monitor  Return 4 months    Return 6 months   A total of 35 minutes was spent reviewing the patient's chart, examining the patient, reviewing labs and imaging and coordinating care with the patient's care team.

## 2024-10-24 ENCOUNTER — PATIENT MESSAGE (OUTPATIENT)
Dept: ADMINISTRATIVE | Facility: OTHER | Age: 74
End: 2024-10-24
Payer: MEDICARE

## 2024-10-26 ENCOUNTER — TELEPHONE (OUTPATIENT)
Dept: ENDOSCOPY | Facility: HOSPITAL | Age: 74
End: 2024-10-26
Payer: MEDICARE

## 2024-10-26 NOTE — TELEPHONE ENCOUNTER
"Contacted the patient to schedule an endoscopy procedure(s) EGD. Spoke with patient. Patient states Nell Christianson MD wants her to get procedure in 3/2025. Per last EGD report on 3/10/23 "Repeat upper endoscopy in 2 years for surveillance". Referral deferred.  "

## 2024-10-28 ENCOUNTER — HOSPITAL ENCOUNTER (OUTPATIENT)
Dept: RADIOLOGY | Facility: HOSPITAL | Age: 74
Discharge: HOME | End: 2024-10-28
Attending: STUDENT IN AN ORGANIZED HEALTH CARE EDUCATION/TRAINING PROGRAM
Payer: MEDICARE

## 2024-10-28 VITALS — WEIGHT: 229 LBS | HEIGHT: 62 IN | BODY MASS INDEX: 42.14 KG/M2

## 2024-10-28 DIAGNOSIS — Z12.31 ENCOUNTER FOR SCREENING MAMMOGRAM FOR BREAST CANCER: ICD-10-CM

## 2024-10-28 PROCEDURE — 77067 SCR MAMMO BI INCL CAD: CPT | Mod: TC

## 2024-10-28 PROCEDURE — 77063 BREAST TOMOSYNTHESIS BI: CPT | Mod: TC

## 2024-10-28 PROCEDURE — 77067 SCR MAMMO BI INCL CAD: CPT | Mod: 26,,, | Performed by: RADIOLOGY

## 2024-10-28 PROCEDURE — 77063 BREAST TOMOSYNTHESIS BI: CPT | Mod: 26,,, | Performed by: RADIOLOGY

## 2024-10-29 ENCOUNTER — HOSPITAL ENCOUNTER (OUTPATIENT)
Dept: RADIOLOGY | Facility: HOSPITAL | Age: 74
Discharge: HOME | End: 2024-10-29
Attending: REGISTERED NURSE
Payer: MEDICARE

## 2024-10-29 DIAGNOSIS — S32.010A CLOSED COMPRESSION FRACTURE OF BODY OF L1 VERTEBRA: ICD-10-CM

## 2024-10-29 PROCEDURE — 72100 X-RAY EXAM L-S SPINE 2/3 VWS: CPT | Mod: TC,HCNC

## 2024-10-29 PROCEDURE — 72100 X-RAY EXAM L-S SPINE 2/3 VWS: CPT | Mod: 26,HCNC,, | Performed by: INTERNAL MEDICINE

## 2024-11-01 ENCOUNTER — PATIENT MESSAGE (OUTPATIENT)
Dept: PODIATRY | Facility: CLINIC | Age: 74
End: 2024-11-01
Payer: MEDICARE

## 2024-11-01 ENCOUNTER — TELEPHONE (OUTPATIENT)
Dept: PRIMARY CARE CLINIC | Facility: CLINIC | Age: 74
End: 2024-11-01
Payer: MEDICARE

## 2024-11-03 NOTE — DISCHARGE INSTRUCTIONS
Knee Replacement, After: Hospital Recovery (English) View Edit Remove   Knee Replacement, Total (English) View Edit Remove   Knee Replacement, Understanding (English) View Edit Remove   Diabetes, Diet (English) View Edit Remove   Acetaminophen; Oxycodone tablets (English) View Edit Remove   Wound Infection, Recognizing and Treating (English) View Edit Remove        yes...

## 2024-11-04 ENCOUNTER — TELEPHONE (OUTPATIENT)
Dept: PODIATRY | Facility: CLINIC | Age: 74
End: 2024-11-04
Payer: MEDICARE

## 2024-11-04 ENCOUNTER — PATIENT MESSAGE (OUTPATIENT)
Dept: ALLERGY | Facility: CLINIC | Age: 74
End: 2024-11-04
Payer: MEDICARE

## 2024-11-04 NOTE — TELEPHONE ENCOUNTER
Spoke with patient to help reschedule Qutenza appointment due to its not in. Patient voice understanding to conversation.

## 2024-11-05 ENCOUNTER — OFFICE VISIT (OUTPATIENT)
Dept: ORTHOPEDICS | Facility: CLINIC | Age: 74
End: 2024-11-05
Payer: MEDICARE

## 2024-11-05 VITALS — WEIGHT: 231.5 LBS | BODY MASS INDEX: 42.6 KG/M2 | HEIGHT: 62 IN

## 2024-11-05 DIAGNOSIS — M80.88XA OSTEOPOROTIC COMPRESSION FRACTURE OF VERTEBRA, INITIAL ENCOUNTER: ICD-10-CM

## 2024-11-05 PROCEDURE — 1125F AMNT PAIN NOTED PAIN PRSNT: CPT | Mod: HCNC,CPTII,S$GLB, | Performed by: REGISTERED NURSE

## 2024-11-05 PROCEDURE — 3044F HG A1C LEVEL LT 7.0%: CPT | Mod: HCNC,CPTII,S$GLB, | Performed by: REGISTERED NURSE

## 2024-11-05 PROCEDURE — 1159F MED LIST DOCD IN RCRD: CPT | Mod: HCNC,CPTII,S$GLB, | Performed by: REGISTERED NURSE

## 2024-11-05 PROCEDURE — 3008F BODY MASS INDEX DOCD: CPT | Mod: HCNC,CPTII,S$GLB, | Performed by: REGISTERED NURSE

## 2024-11-05 PROCEDURE — 3288F FALL RISK ASSESSMENT DOCD: CPT | Mod: HCNC,CPTII,S$GLB, | Performed by: REGISTERED NURSE

## 2024-11-05 PROCEDURE — 99999 PR PBB SHADOW E&M-EST. PATIENT-LVL V: CPT | Mod: PBBFAC,HCNC,, | Performed by: REGISTERED NURSE

## 2024-11-05 PROCEDURE — 3066F NEPHROPATHY DOC TX: CPT | Mod: HCNC,CPTII,S$GLB, | Performed by: REGISTERED NURSE

## 2024-11-05 PROCEDURE — 1101F PT FALLS ASSESS-DOCD LE1/YR: CPT | Mod: HCNC,CPTII,S$GLB, | Performed by: REGISTERED NURSE

## 2024-11-05 PROCEDURE — 3061F NEG MICROALBUMINURIA REV: CPT | Mod: HCNC,CPTII,S$GLB, | Performed by: REGISTERED NURSE

## 2024-11-05 PROCEDURE — 99213 OFFICE O/P EST LOW 20 MIN: CPT | Mod: HCNC,S$GLB,, | Performed by: REGISTERED NURSE

## 2024-11-05 PROCEDURE — 4010F ACE/ARB THERAPY RXD/TAKEN: CPT | Mod: HCNC,CPTII,S$GLB, | Performed by: REGISTERED NURSE

## 2024-11-05 PROCEDURE — 1157F ADVNC CARE PLAN IN RCRD: CPT | Mod: HCNC,CPTII,S$GLB, | Performed by: REGISTERED NURSE

## 2024-11-05 NOTE — PROGRESS NOTES
"DATE: 11/5/2024  PATIENT: Vivienne Jose    Attending Physician: Jose Amaro M.D.    HISTORY:  Vivienne Jose is a 74 y.o. female who returns to me today for follow up after a kyphoplasty.  She was last seen by me 9/4/2024.  Today she is doing well but notes mild low back pain. She is very pleased with the decrease in her pain. She is now on Tymlos for her osteoporosis.  The Patient denies myelopathic symptoms such as handwriting changes or difficulty with buttons/coins/keys. Denies perineal paresthesias, bowel/bladder dysfunction.    EXAM:  Ht 5' 2" (1.575 m)   Wt 105 kg (231 lb 7.7 oz)   LMP  (LMP Unknown)   BMI 42.34 kg/m²   stable    IMAGING:    Today I personally re- reviewed AP, Lat and Flex/Ex  upright L-spine that demonstrate T12 and L1 compression fractures wit kyphplasties.       Lumbar MRI shows acute burst compression fractures of the T12 and L1 vertebral bodies with greater vertebral body height loss of T12 with 5 mm retropulsion and resulting mild spinal canal stenosis.     Body mass index is 42.34 kg/m².    Hemoglobin A1C   Date Value Ref Range Status   09/03/2024 6.3 (H) 4.0 - 5.6 % Final     Comment:     ADA Screening Guidelines:  5.7-6.4%  Consistent with prediabetes  >or=6.5%  Consistent with diabetes    High levels of fetal hemoglobin interfere with the HbA1C  assay. Heterozygous hemoglobin variants (HbS, HgC, etc)do  not significantly interfere with this assay.   However, presence of multiple variants may affect accuracy.     06/03/2024 7.5 (H) 4.0 - 5.6 % Final     Comment:     ADA Screening Guidelines:  5.7-6.4%  Consistent with prediabetes  >or=6.5%  Consistent with diabetes    High levels of fetal hemoglobin interfere with the HbA1C  assay. Heterozygous hemoglobin variants (HbS, HgC, etc)do  not significantly interfere with this assay.   However, presence of multiple variants may affect accuracy.     03/08/2024 7.2 (H) 4.0 - 5.6 % Final     Comment:     ADA Screening " Guidelines:  5.7-6.4%  Consistent with prediabetes  >or=6.5%  Consistent with diabetes    High levels of fetal hemoglobin interfere with the HbA1C  assay. Heterozygous hemoglobin variants (HbS, HgC, etc)do  not significantly interfere with this assay.   However, presence of multiple variants may affect accuracy.           ASSESSMENT/PLAN:    Vivienne was seen today for low-back pain.    Diagnoses and all orders for this visit:    Osteoporotic compression fracture of vertebra, initial encounter  -     Ambulatory referral/consult to Orthopedics      Follow up as needed.

## 2024-11-08 ENCOUNTER — PROCEDURE VISIT (OUTPATIENT)
Dept: PODIATRY | Facility: CLINIC | Age: 74
End: 2024-11-08
Payer: MEDICARE

## 2024-11-08 ENCOUNTER — CLINICAL SUPPORT (OUTPATIENT)
Dept: ALLERGY | Facility: CLINIC | Age: 74
End: 2024-11-08
Payer: MEDICARE

## 2024-11-08 VITALS
WEIGHT: 229.5 LBS | DIASTOLIC BLOOD PRESSURE: 67 MMHG | BODY MASS INDEX: 42.23 KG/M2 | HEART RATE: 88 BPM | HEIGHT: 62 IN | SYSTOLIC BLOOD PRESSURE: 136 MMHG

## 2024-11-08 DIAGNOSIS — E11.40 PAINFUL DIABETIC NEUROPATHY: Primary | ICD-10-CM

## 2024-11-08 DIAGNOSIS — J30.9 CHRONIC ALLERGIC RHINITIS: Primary | ICD-10-CM

## 2024-11-08 PROCEDURE — 99999 PR PBB SHADOW E&M-EST. PATIENT-LVL I: CPT | Mod: PBBFAC,HCNC,,

## 2024-11-08 RX ORDER — DULOXETIN HYDROCHLORIDE 60 MG/1
60 CAPSULE, DELAYED RELEASE ORAL DAILY
Qty: 30 CAPSULE | Refills: 11 | Status: SHIPPED | OUTPATIENT
Start: 2024-11-08 | End: 2025-11-08

## 2024-11-08 NOTE — PROGRESS NOTES
Subjective:     Patient    Vivienne Jose is a 74 y.o. female.    Problem    01/09/24: Previously followed by Rebecca Ohara and Abhay, last seen 1 year ago. Had both 1st toenails removed years ago due to ingrown/fungal nails, still has occasional spicules that bother her. Has sensitivity throughout her 1st toes and feet; has numbness, tingling, burning, shooting, cramping. On gabapentin 1200 mg/day which does improve her nerve pain and does not make her sleepy that she can tell; previously tried pregabalin which made her bones hurt. Sleepy at baseline.      02/09/24: Started duloxetine 20 mg/day at last visit for severe BLE nerve pain/symptoms, with some improvement although it may have caused intention tremors in her hands and may be causing dry mouth; also had a couple other medication changes recently so she is unsure which medications may be causing which side effects. Also on gabapentin 1800 mg/day. Has some ongoing nerve symptoms.     04/23/24: Returns for followup of BLE nerve pain/symptoms. Qutenza patches approved. Has decreased gabapentin to 600 mg/day since starting the duloxetine with no worsening of symptoms, also now less sedated. Nerve symptoms are ongoing though.     07/30/24: Returns for Qutenza application.     11/08/24: Returns for Qutenza. Still having significant nerve symptoms and pain in both feet, numbness has improved some with Qutenza which she is happy about.     Primary Care Provider    Primary Care Provider: Nalini Hall MD   Last Seen: 10/18/2024     History    History obtained from patient and review of medical records.     Past Medical History:   Diagnosis Date    Allergy     Angio-edema     Arthritis     Cataract     bilateral - not removed    Cerebrovascular malformation     Cirrhosis 11/24/2020    Colon polyps     Diabetes mellitus, type 2     Diabetic peripheral neuropathy     Edema of both feet 8/19/2022    GERD (gastroesophageal reflux disease)     Herpes infection      Hypothyroidism     Kidney stones     Mild nonproliferative diabetic retinopathy of 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 edema associated with type 2 diabetes mellitus 4/18/2019    DEL RIO (nonalcoholic steatohepatitis) 8/19/2022    Nausea & vomiting 11/23/2015    Obesity, morbid     Ovarian cyst     Seizure disorder, focal motor     Sleep apnea     Type II or unspecified type diabetes mellitus with neurological manifestations, uncontrolled(250.62)     Urticaria        Past Surgical History:   Procedure Laterality Date    CARPAL TUNNEL RELEASE Right 2014    CATARACT EXTRACTION W/  INTRAOCULAR LENS IMPLANT Right 9/19/2023    Procedure: EXTRACTION, CATARACT, WITH IOL INSERTION;  Surgeon: Lyndon Ortiz MD;  Location: [facility];  Service: Ophthalmology;  Laterality: Right;    CATARACT EXTRACTION W/  INTRAOCULAR LENS IMPLANT Left 10/3/2023    Procedure: EXTRACTION, CATARACT, WITH IOL INSERTION;  Surgeon: Lyndon Ortiz MD;  Location: [facility] OR;  Service: Ophthalmology;  Laterality: Left;    COLONOSCOPY      COLONOSCOPY N/A 9/13/2017    Procedure: COLONOSCOPY Golytely;  Surgeon: Gisela Wall MD;  Location: [facility];  Service: Endoscopy;  Laterality: N/A;    COLONOSCOPY N/A 9/9/2022    Procedure: COLONOSCOPY Extended Suprep;  Surgeon: Britt Jasso MD;  Location: [facility];  Service: Endoscopy;  Laterality: N/A;    CYST REMOVAL      skin; multiples    ESOPHAGOGASTRODUODENOSCOPY Left 10/15/2021    Procedure: EGD (ESOPHAGOGASTRODUODENOSCOPY);  Surgeon: Luis Fernando Taveras MD;  Location: [facility] (4TH FLR);  Service: Endoscopy;  Laterality: Left;  cirrhosis labwork am of procedure  last seizure 1973  COVID [facility] 10/12/21 [facility]    ESOPHAGOGASTRODUODENOSCOPY N/A 3/10/2023    Procedure: EGD (ESOPHAGOGASTRODUODENOSCOPY);  Surgeon: Christa Caldera MD;  Location: [facility] (2ND FLR);  Service: Endoscopy;  Laterality: N/A;  Medically Urgent  cirrohsis-labs done on 2/9/23 (< 90 days)  New onset A-fib  3/1  instructions to [facility]    EXTRACORPOREAL SHOCK WAVE LITHOTRIPSY  2002    HYSTERECTOMY  1984    JOINT REPLACEMENT Right 2019    knee    KIDNEY STONE SURGERY      KNEE ARTHROPLASTY Right 3/6/2019    Procedure: ARTHROPLASTY, KNEE;  Surgeon: Pj Gamboa MD;  Location: [facility] OR;  Service: Orthopedics;  Laterality: Right;  Depuy (Stephen notified , CC)    KYPHOPLASTY, SPINE, LUMBAR Bilateral 2024    Procedure: T12 and L1 SpineJack ([place]);  Surgeon: Myron Ocasio DO;  Location: [facility] OR;  Service: Pain Management;  Laterality: Bilateral;    THYROIDECTOMY  1977         TONSILLECTOMY, ADENOIDECTOMY      TRIGGER FINGER RELEASE      TUBAL LIGATION          Objective:     Vitals  Wt Readings from Last 1 Encounters:   24 104.1 kg (229 lb 8 oz)     Temp Readings from Last 1 Encounters:   24 98.1 °F (36.7 °C) (Temporal)     BP Readings from Last 1 Encounters:   24 136/67     Pulse Readings from Last 1 Encounters:   24 88       Dermatological Exam    Skin:  Pedal hair growth diminished and Pedal skin thin and shiny on right  Pedal hair growth diminished and Pedal skin thin and shiny on left     Nails:  10 nail(s) elongated    Vascular Exam    Arteries:  Posterior tibial artery palpable on right  Dorsalis pedis artery palpable on right  Posterior tibial artery palpable on left  Dorsalis pedis artery palpable on left    Veins:  Superficial veins unremarkable on right  Superficial veins unremarkable on left    Swellin+ nonpitting on right  1+ nonpitting on left    Neurological Exam    Dexter touch test:  6/6 sites sensed, light touch intact     Musculoskeletal Exam    Footwear:  Casual on right  Casual on left    Gait Exam:   Ambulatory Status: Ambulatory  Gait: Normal  Assistive Devices: None    Foot Progression Angle:  Normal on right  Normal on left     Right Lower Extremity Additional Findings:  Right foot and ankle function, strength, and range  of motion unremarkable except as noted above.     Left Lower Extremity Additional Findings:  Left foot and ankle function, strength, and range of motion unremarkable except as noted above.    Imaging and Other Tests    Imaging:  Independently reviewed and interpreted imaging, findings are as follows: N/A    Other Tests: The following A1c results were reviewed.   Hemoglobin A1C   Date Value Ref Range Status   09/03/2024 6.3 (H) 4.0 - 5.6 % Final     Comment:     ADA Screening Guidelines:  5.7-6.4%  Consistent with prediabetes  >or=6.5%  Consistent with diabetes    High levels of fetal hemoglobin interfere with the HbA1C  assay. Heterozygous hemoglobin variants (HbS, HgC, etc)do  not significantly interfere with this assay.   However, presence of multiple variants may affect accuracy.     06/03/2024 7.5 (H) 4.0 - 5.6 % Final     Comment:     ADA Screening Guidelines:  5.7-6.4%  Consistent with prediabetes  >or=6.5%  Consistent with diabetes    High levels of fetal hemoglobin interfere with the HbA1C  assay. Heterozygous hemoglobin variants (HbS, HgC, etc)do  not significantly interfere with this assay.   However, presence of multiple variants may affect accuracy.     03/08/2024 7.2 (H) 4.0 - 5.6 % Final     Comment:     ADA Screening Guidelines:  5.7-6.4%  Consistent with prediabetes  >or=6.5%  Consistent with diabetes    High levels of fetal hemoglobin interfere with the HbA1C  assay. Heterozygous hemoglobin variants (HbS, HgC, etc)do  not significantly interfere with this assay.   However, presence of multiple variants may affect accuracy.           Assessment:     Encounter Diagnosis   Name Primary?    Painful diabetic neuropathy Yes              Plan:     I counseled the patient on her conditions, their implications and medical management.    Diabetic foot with peripheral neuropathy: chronic stable   -Performed shoe inspection and diabetic foot education. Reviewed importance of blood glucose control, proper  nutrition, and foot hygiene to minimize risk of complications of diabetes. Recommended daily foot inspections, daily moisturizer to feet, avoiding sharp instruments to feet, appropriate footwear at all times when ambulating, and following up regularly for routine foot care.   -Diabetic foot risk: 2023 IWGDF very low ulcer risk.   -Next foot exam due January 2025.  -Will return for routine foot care.     Painful diabetic neuropathy: chronic stable  -Continue gabapentin 600-1200 mg/day.   -Increase duloxetine to 60 mg/night.   -Qutenza patches x4 applied to both feet, 30 minute application, removed and cleansed. Ordered next Qutenza patches for both feet.         Return to clinic in 3 months for nerve pain, call sooner PRN.

## 2024-11-09 ENCOUNTER — PATIENT MESSAGE (OUTPATIENT)
Dept: PRIMARY CARE CLINIC | Facility: CLINIC | Age: 74
End: 2024-11-09
Payer: MEDICARE

## 2024-11-09 DIAGNOSIS — E11.49 TYPE 2 DIABETES MELLITUS WITH OTHER NEUROLOGIC COMPLICATION, WITH LONG-TERM CURRENT USE OF INSULIN: ICD-10-CM

## 2024-11-09 DIAGNOSIS — Z79.4 TYPE 2 DIABETES MELLITUS WITH OTHER NEUROLOGIC COMPLICATION, WITH LONG-TERM CURRENT USE OF INSULIN: ICD-10-CM

## 2024-11-11 RX ORDER — PEN NEEDLE, DIABETIC 30 GX3/16"
1 NEEDLE, DISPOSABLE MISCELLANEOUS DAILY
Qty: 100 EACH | Refills: 6 | Status: SHIPPED | OUTPATIENT
Start: 2024-11-11

## 2024-11-25 ENCOUNTER — PATIENT MESSAGE (OUTPATIENT)
Dept: PRIMARY CARE CLINIC | Facility: CLINIC | Age: 74
End: 2024-11-25
Payer: MEDICARE

## 2024-11-25 DIAGNOSIS — B00.9 RECURRENT HSV (HERPES SIMPLEX VIRUS): ICD-10-CM

## 2024-11-26 RX ORDER — INSULIN DEGLUDEC 100 U/ML
32 INJECTION, SOLUTION SUBCUTANEOUS DAILY
Qty: 30 ML | Refills: 3 | Status: SHIPPED | OUTPATIENT
Start: 2024-11-26 | End: 2024-11-27 | Stop reason: SDUPTHER

## 2024-11-26 RX ORDER — VALACYCLOVIR HYDROCHLORIDE 500 MG/1
500 TABLET, FILM COATED ORAL DAILY
Qty: 90 TABLET | Refills: 3 | Status: SHIPPED | OUTPATIENT
Start: 2024-11-26

## 2024-11-26 NOTE — TELEPHONE ENCOUNTER
PT IS REQUESTING A MED REFILL FOR MEDICATIONS = valACYclovir 500 MG ,  insulin degludec 100 unit/mL (3 mL) insulin pen.

## 2024-11-27 ENCOUNTER — TELEPHONE (OUTPATIENT)
Dept: PRIMARY CARE CLINIC | Facility: CLINIC | Age: 74
End: 2024-11-27
Payer: MEDICARE

## 2024-11-27 RX ORDER — INSULIN DEGLUDEC 100 U/ML
32 INJECTION, SOLUTION SUBCUTANEOUS DAILY
Qty: 30 ML | Refills: 3 | Status: SHIPPED | OUTPATIENT
Start: 2024-11-27

## 2024-12-04 DIAGNOSIS — Z79.4 TYPE 2 DIABETES MELLITUS WITH STAGE 3A CHRONIC KIDNEY DISEASE, WITH LONG-TERM CURRENT USE OF INSULIN: ICD-10-CM

## 2024-12-04 DIAGNOSIS — N18.31 TYPE 2 DIABETES MELLITUS WITH STAGE 3A CHRONIC KIDNEY DISEASE, WITH LONG-TERM CURRENT USE OF INSULIN: ICD-10-CM

## 2024-12-04 DIAGNOSIS — E11.22 TYPE 2 DIABETES MELLITUS WITH STAGE 3A CHRONIC KIDNEY DISEASE, WITH LONG-TERM CURRENT USE OF INSULIN: ICD-10-CM

## 2024-12-09 ENCOUNTER — CLINICAL SUPPORT (OUTPATIENT)
Dept: ALLERGY | Facility: CLINIC | Age: 74
End: 2024-12-09
Payer: MEDICARE

## 2024-12-09 DIAGNOSIS — J30.9 CHRONIC ALLERGIC RHINITIS: Primary | ICD-10-CM

## 2024-12-09 PROCEDURE — 95115 IMMUNOTHERAPY ONE INJECTION: CPT | Mod: HCNC,S$GLB,, | Performed by: STUDENT IN AN ORGANIZED HEALTH CARE EDUCATION/TRAINING PROGRAM

## 2024-12-10 RX ORDER — SEMAGLUTIDE 0.68 MG/ML
INJECTION, SOLUTION SUBCUTANEOUS
Qty: 9 EACH | Refills: 3 | Status: SHIPPED | OUTPATIENT
Start: 2024-12-10

## 2024-12-20 ENCOUNTER — OFFICE VISIT (OUTPATIENT)
Dept: PAIN MEDICINE | Facility: CLINIC | Age: 74
End: 2024-12-20
Payer: MEDICARE

## 2024-12-20 VITALS
BODY MASS INDEX: 42.23 KG/M2 | DIASTOLIC BLOOD PRESSURE: 70 MMHG | HEART RATE: 90 BPM | SYSTOLIC BLOOD PRESSURE: 116 MMHG | WEIGHT: 229.5 LBS | HEIGHT: 62 IN

## 2024-12-20 DIAGNOSIS — M80.88XA OSTEOPOROTIC COMPRESSION FRACTURE OF VERTEBRA, INITIAL ENCOUNTER: Primary | ICD-10-CM

## 2024-12-20 DIAGNOSIS — M47.816 LUMBAR SPONDYLOSIS: ICD-10-CM

## 2024-12-20 DIAGNOSIS — M54.16 LUMBAR RADICULOPATHY: ICD-10-CM

## 2024-12-20 PROCEDURE — 99999 PR PBB SHADOW E&M-EST. PATIENT-LVL IV: CPT | Mod: PBBFAC,,, | Performed by: STUDENT IN AN ORGANIZED HEALTH CARE EDUCATION/TRAINING PROGRAM

## 2024-12-20 NOTE — PROGRESS NOTES
Chronic Pain - f/u    Referring Physician: No ref. provider found    Date: 12/20/2024     Re: Vivienne Jose  MR#: [id number]  YOB: 1950  Age: 74 y.o.    Chief Complaint:   No chief complaint on file.    **This note is dictated using the M*Modal Fluency Direct word recognition program. There are word recognition mistakes that are occasionally missed on review.**    ASSESSMENT: 74 y.o. year old female with lower back pain, consistent with     No diagnosis found.      PLAN:     Acute T12 and L1 osteoporotic compression fracture - improved  -The patient started getting severe back pain radiating to the groin after she bent to  her mom in early August.  At that time she was found to have new compression fractures at T12 and L1.  I suspect that the inflammation from the compression fracture is causing nerve root irritation which is causing the radicular component that we are seeing in the T12 and L1 distribution.  9/23/24 - T12 and L1 Spinejack - gen sed - ASA81 - stop Rybelsus - needs post-op meds - f/u on 9/27 - cleared to stop aspirin - excellent height restoration of vertebral body. No extravasation. - moderate improvement at 1.5m then a bit worse again at 3m.  -New MRI to evaluate for new fracture given that her pain improved and then has been worsening again.     Lumbar radiculopathy from T12 or L1 nerve root irritation  -suspect that this is causing the pain wrapping around to abdomen  -discussed possible b/l T12 or L1 TFESI if MRI does not show any new fracture    Osteoporosis   -continue with  Papi Stanga for osteoporosis treatment  -On Tymlos    Lumbar spondylosis  -severe facet arthropathy at bilateral L4-5 and L5-S1  -I suspect this was responsible for her chronic axial low back pain prior to her accident.  -consider MBB/RFA after healed from compression fracture    - RTC 2 months  - Counseled patient regarding the importance of weight loss and activity modification and physical  therapy.    The above plan and management options were discussed at length with patient. Patient is in agreement with the above and verbalized understanding. It will be communicated with the referring physician via electronic record, fax, or mail.  Lab/study reports reviewed were important and necessary because subsequent medical and treatment recommendations required review of the above lab/study reports. Images viewed/reviewed above were important and necessary because subsequent medical and treatment recommendations required review of the reviewed image(s).     Electronically signed by:  Myron Ocasio DO  12/20/2024    =========================================================================================================    SUBJECTIVE:    Interval History 12/20/2024:   Vivienne Jose is a 74 y.o. female presents to the clinic for follow up.  Since last visit the pain has has worsened.  The pain is located in the lower back area and radiates to the bilateral sides .  The pain is described as aching and pressure    The patient states that her back pain is located in the low/mid low back.  She states that it was getting better to where she didn't feel it but then all of a sudden it started getting worse again.  She denies any inciting event when it started getting worse again.  The pain is worse with laying down.  Leaning to the side starts hurting.  The pain is not the same level as prior from the compression fracture.      At BEST  5/10   At WORST  5/10 on the WORST day.    On average pain is rated as 5/10.   Today the pain is rated as 5/10  Symptoms interfere with daily activity.   Exacerbating factors: Sitting, Standing, Laying, and Walking.    Mitigating factors nothing.     Current pain medications: duloxetine 20mg, Tymlos for osteoporosis.  Failed Pain Medications: prednisone, cannot take NSAIDs  Stopped because pain better: Hydrocodone (rare), tizanidine, gabapentin,     Pain procedures:  9/23/24 -  "T12 and L1 Spinejack - gen sed - ASA81 - stop Rybelsus - needs post-op meds - f/u on 9/27 - cleared to stop aspirin - some impropvement @POD#4    Interval History 9/27/2024:   Vivienne Jose is a 74 y.o. female presents to the clinic for follow up.  Since last visit the pain has has slightly improved.  The patient is s/p T12/L1 spinejack on 9/23 and the abdominal and back pain is improving. The muscle spasms have eased up a little.    The pain is located in the lower back area and radiates to the right side .  The pain is described as aching    At BEST  5/10   At WORST  8/10 on the WORST day.    On average pain is rated as 5/10.   Today the pain is rated as 3/10  Symptoms interfere with daily activity.   Exacerbating factors: Sitting, Standing, Laying, and Walking.    Mitigating factors medications.     Initial hx:  Vivienne Jose is a 74 y.o. female presents to the clinic for the evaluation of lower mid back  pain. The pain started years ago following no inciting event and symptoms have been worsening.  The patient states that she was trying to  her mother in the beginning of August and she felt a "snap" and then went to the ED and was found to have acute compression fracture at T12 and L1.  The back pain has gotten much worse since it started.    Her pain is actually located predominantly in the low back.  She does not have too much pain in the upper low back.  The pain seems to start in the low back and will wrap around to the abdomen.  This has gotten worse since the compression fracture.      Pain Description:    The pain is located in the lower back area and radiates to the stomach .    At BEST  2/10   At WORST  10/10 on the WORST day.    On average pain is rated as 5/10.   Today the pain is rated as 5/10  The pain is continuous.  The pain is described as aching and shooting.    Symptoms interfere with daily activity and sleeping.   Exacerbating factors: Sitting, Standing, Laying, Bending, Lifting, and " Getting out of bed/chair.    Mitigating factors medications.   She reports six hours of sleep per night.    Physical Therapy/Home Exercise: No, not currently in physical therapy or home exercise program    Current Pain Medications:    - calcitonin, tizanidine, gabapentin, duloxetine 20mg    Failed Pain Medications:    - prednisone, cannot take NSAIDs    Pain Treatment Therapies:    Pain procedures: none  Physical Therapy: yes  Chiropractor: none  Acupuncture: none  TENS unit: none  Spinal decompression: none  Joint replacement: R TKA    Patient denies urinary incontinence and bowel incontinence and weakness  Patient denies any suicidal or homicidal ideations     report:  Reviewed and consistent with medication use as prescribed.    Imaging:   MRI Lumbar 09/2024:  FINDINGS:  Alignment: Grade 1 anterolisthesis of L4 on L5.     Vertebrae: Compression deformity of the T12 vertebral body with approximately 30% height loss and 5 mm of associated retropulsion.  Additional mild compression deformity of the L1 vertebral body with minimal retropulsion eccentric to the right.  Extensive marrow edema throughout the T12 vertebral body and to a lesser extent L1 vertebral body.  Mild edema of the prevertebral soft tissues.  Trace fluid in the T11-T12 disc space, likely post-traumatic.  L1 vertebral body hemangioma.     Discs: Mild disc height loss of T11-T12, T12-L1 and L4-L5. no evidence for discitis.     Cord: No cord signal abnormality or evidence of arachnoiditis.  Conus terminates at L1-L2.     Degenerative findings:     T11-T12: Fracture level with 5 mm of retropulsion of the superior endplate of T12 with mild spinal canal stenosis.     T12-L1: Fracture level with minimal retropulsion eccentric to the right resulting in mild spinal canal stenosis.     L1-L2: Circumferential disc bulge and mild facet arthropathy.  No spinal canal stenosis or neural foraminal narrowing.     L2-L3: Circumferential disc bulge with right  paracentral protrusion and mild facet arthropathy.  No spinal canal stenosis or neural foraminal narrowing.     L3-L4: Mild facet arthropathy.  No spinal canal stenosis or neural foraminal narrowing.     L4-L5: Uncovering of the intervertebral disc with circumferential bulge and severe facet arthropathy result in mild spinal canal stenosis with moderate effacement of the right lateral recess as well as moderate right, mild left neural foraminal narrowing.  Trace bilateral facet effusions noted.     L5-S1: Moderate facet arthropathy.  No spinal canal stenosis or neural foraminal narrowing.     Paraspinal muscles & soft tissues: Bilateral simple renal cysts.  Moderate atrophy of the paraspinal muscles.        9/5/2024    11:18 AM 6/9/2015     1:57 PM   Pain Disability Index (PDI)   Family/Home Responsibilities: 5 6   Recreation: 5 4   Occupation: 5 3   Sexual Behavior: 5 0   Self Care: 5 0   Life-Support Activities: 5 0   Pain Disability Index (PDI) 35 14        Past Medical History:   Diagnosis Date    Allergy     Angio-edema     Arthritis     Cataract     bilateral - not removed    Cerebrovascular malformation     Cirrhosis 11/24/2020    Colon polyps     Diabetes mellitus, type 2     Diabetic peripheral neuropathy     Edema of both feet 8/19/2022    GERD (gastroesophageal reflux disease)     Herpes infection     Hypothyroidism     Kidney stones     Mild nonproliferative diabetic retinopathy of 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 edema associated with type 2 diabetes mellitus 4/18/2019    DEL RIO (nonalcoholic steatohepatitis) 8/19/2022    Nausea & vomiting 11/23/2015    Obesity, morbid     Ovarian cyst     Seizure disorder, focal motor     Sleep apnea     Type II or unspecified type diabetes mellitus with neurological manifestations, uncontrolled(250.62)     Urticaria      Past Surgical History:   Procedure Laterality Date    CARPAL TUNNEL RELEASE Right 2014    CATARACT EXTRACTION W/  INTRAOCULAR LENS IMPLANT Right 9/19/2023     Procedure: EXTRACTION, CATARACT, WITH IOL INSERTION;  Surgeon: Lyndon Ortiz MD;  Location: [facility] OR;  Service: Ophthalmology;  Laterality: Right;    CATARACT EXTRACTION W/  INTRAOCULAR LENS IMPLANT Left 10/3/2023    Procedure: EXTRACTION, CATARACT, WITH IOL INSERTION;  Surgeon: Lyndon Ortiz MD;  Location: [facility] OR;  Service: Ophthalmology;  Laterality: Left;    COLONOSCOPY      COLONOSCOPY N/A 9/13/2017    Procedure: COLONOSCOPY Golytely;  Surgeon: Gisela Wall MD;  Location: [facility];  Service: Endoscopy;  Laterality: N/A;    COLONOSCOPY N/A 9/9/2022    Procedure: COLONOSCOPY Extended Suprep;  Surgeon: Britt Jasso MD;  Location: [facility] ENDO;  Service: Endoscopy;  Laterality: N/A;    CYST REMOVAL      skin; multiples    ESOPHAGOGASTRODUODENOSCOPY Left 10/15/2021    Procedure: EGD (ESOPHAGOGASTRODUODENOSCOPY);  Surgeon: Luis Fernando Taveras MD;  Location: [facility] (4TH FLR);  Service: Endoscopy;  Laterality: Left;  cirrhosis labwork am of procedure  last seizure 1973  COVID test on 10/12/21 at [facility]    ESOPHAGOGASTRODUODENOSCOPY N/A 3/10/2023    Procedure: EGD (ESOPHAGOGASTRODUODENOSCOPY);  Surgeon: Christa Caldera MD;  Location: [facility] (2ND FLR);  Service: Endoscopy;  Laterality: N/A;  Medically Urgent  cirrohsis-labs done on 2/9/23 (< 90 days)  New onset A-fib  3/1 instructions to portal-st  Precall complete- [place]    EXTRACORPOREAL SHOCK WAVE LITHOTRIPSY  2002    HYSTERECTOMY  1984    JOINT REPLACEMENT Right 03/06/2019    knee    KIDNEY STONE SURGERY      KNEE ARTHROPLASTY Right 3/6/2019    Procedure: ARTHROPLASTY, KNEE;  Surgeon: Pj Gamboa MD;  Location: [facility];  Service: Orthopedics;  Laterality: Right;  Depuy (Stephen notified 2/21, CC)    KYPHOPLASTY, SPINE, LUMBAR Bilateral 9/23/2024    Procedure: T12 and L1 SpineJack (Lakia);  Surgeon: Myron Ocasio DO;  Location: [facility] OR;  Service: Pain Management;  Laterality: Bilateral;    THYROIDECTOMY  1977          TONSILLECTOMY, ADENOIDECTOMY      TRIGGER FINGER RELEASE      TUBAL LIGATION       Social History     Socioeconomic History    Marital status:    Occupational History    Occupation: retired     Employer: [employer]   Tobacco Use    Smoking status: Never     Passive exposure: Never    Smokeless tobacco: Never   Substance and Sexual Activity    Alcohol use: No     Alcohol/week: 0.0 standard drinks of alcohol    Drug use: No    Sexual activity: Yes     Partners: Male     Social Drivers of Health     Financial Resource Strain: Low Risk  (8/9/2024)    Overall Financial Resource Strain (CARDIA)     Difficulty of Paying Living Expenses: Not hard at all   Food Insecurity: No Food Insecurity (8/9/2024)    Hunger Vital Sign     Worried About Running Out of Food in the Last Year: Never true     Ran Out of Food in the Last Year: Never true   Transportation Needs: No Transportation Needs (8/9/2024)    PRAPARE - Transportation     Lack of Transportation (Medical): No     Lack of Transportation (Non-Medical): No   Physical Activity: Inactive (1/17/2024)    Exercise Vital Sign     Days of Exercise per Week: 0 days     Minutes of Exercise per Session: 0 min   Stress: Stress Concern Present (8/9/2024)    [employer] [place] [employer] - Occupational Stress Questionnaire     Feeling of Stress : To some extent   Housing Stability: Low Risk  (8/9/2024)    Housing Stability Vital Sign     Unable to Pay for Housing in the Last Year: No     Homeless in the Last Year: No     Family History   Problem Relation Name Age of Onset    Skin cancer Mother      Macular degeneration Mother      Dementia Mother      Hypertension Mother      Cancer Father      Arthritis Father      Gout Father      No Known Problems Daughter Madisyn     Diabetes Daughter Rudy     Hypertension Daughter Rudy     Arthritis Daughter Arianna     Allergies Son Lyndon     Allergic rhinitis Son Lyndon     Glaucoma Maternal Aunt           Great Maternal Aunt    Leukemia Maternal Uncle      Amblyopia Neg Hx      Cataracts Neg Hx      Retinal detachment Neg Hx      Strabismus Neg Hx      Stroke Neg Hx      Thyroid disease Neg Hx      Kidney disease Neg Hx      Angioedema Neg Hx      Asthma Neg Hx      Atopy Neg Hx      Eczema Neg Hx      Immunodeficiency Neg Hx      Rhinitis Neg Hx      Urticaria Neg Hx         Review of patient's allergies indicates:   Allergen Reactions    Sulfa (sulfonamide antibiotics) Nausea Only    Ciprofloxacin     Januvia [sitagliptin]      abd pain    Lisinopril     Lotensin [benazepril]     Nexium [esomeprazole magnesium] Other (See Comments)     Gas       Current Outpatient Medications   Medication Sig    abaloparatide (TYMLOS) 80 mcg (3,120 mcg/1.56 mL) PnIj Inject 0.04 mLs (80 mcg total) into the skin once daily.    ascorbic acid, vitamin C, (VITAMIN C) 100 MG tablet Take 500 mg by mouth 2 (two) times daily.     aspirin (ECOTRIN) 81 MG EC tablet Take 81 mg by mouth once daily.    azelastine (ASTELIN) 137 mcg (0.1 %) nasal spray 2 sprays (274 mcg total) by Nasal route 2 (two) times daily.    betamethasone dipropionate (DIPROLENE) 0.05 % ointment Apply topically 2 (two) times daily. Use to affected areas for up to 2 weeks then take a 1 week break or decrease to 3 times weekly. Do not apply to groin or face. Use to spot on ear when flaring    blood sugar diagnostic (TRUE METRIX GLUCOSE TEST STRIP) Strp TEST TWO TIMES DAILY    blood-glucose meter Misc [employer] True Metrix Air meter    calcitonin, salmon, (FORTICAL) 200 unit/actuation nasal spray 1 spray by Nasal route once daily.    DULoxetine (CYMBALTA) 60 MG capsule Take 1 capsule (60 mg total) by mouth once daily.    estradioL (ESTRING) 2 mg (7.5 mcg /24 hour) vaginal ring Remove old Estring, place the new one every 3 months.    fluticasone propionate (FLONASE) 50 mcg/actuation nasal spray 2 sprays (100 mcg total) by Each Nostril route once daily.    furosemide (LASIX) 20 MG  "tablet Take 1 tablet (20 mg total) by mouth once daily.    gabapentin (NEURONTIN) 600 MG tablet Take 1 tablet (600 mg total) by mouth 2 (two) times daily.    glipiZIDE (GLUCOTROL) 5 MG tablet TAKE 1 TABLET TWICE DAILY BEFORE MEALS    ipratropium (ATROVENT) 42 mcg (0.06 %) nasal spray 2 sprays by Each Nostril route 4 (four) times daily.    ketotifen (ZADITOR) 0.025 % (0.035 %) ophthalmic solution Place 1-2 drops into both eyes once daily.    L.acid-B.bifidum-B.animal-FOS 25 billion cell -100 mg Cap Take 1 capsule by mouth once daily.    lancets (TRUEPLUS LANCETS) 28 gauge Misc Inject 1 lancet into the skin 2 (two) times daily before meals.    levothyroxine (SYNTHROID) 75 MCG tablet Take 1 tablet (75 mcg total) by mouth once daily.    loratadine (CLARITIN) 10 mg tablet Take 10 mg by mouth once daily.    lovastatin (MEVACOR) 40 MG tablet TAKE 1 TABLET NIGHTLY (SUBSTITUTED FOR MEVACOR)    metFORMIN (GLUCOPHAGE) 1000 MG tablet Take 1 tablet (1,000 mg total) by mouth 2 (two) times daily with meals.    mv-mn/folic acid/vit [id number] (ALIVE ONCE DAILY WOMEN 50 PLUS ORAL) Take 1 tablet by mouth once daily.    omeprazole (PRILOSEC) 20 MG capsule TAKE 1 CAPSULE BY MOUTH DAILY AS NEEDED (ACID REFLUX).    OZEMPIC 0.25 mg or 0.5 mg (2 mg/3 mL) pen injector INJECT 0.5MG UNDER THE SKIN EVERY 7 DAYS (REPLACES RYBELSUS)    pen needle, diabetic (BD ULTRA-FINE EDUARDO PEN NEEDLE) 32 gauge x 5/32" Ndle Inject 1 Needle into the skin once daily.    spironolactone (ALDACTONE) 50 MG tablet Take 1 tablet (50 mg total) by mouth every other day.    tiZANidine (ZANAFLEX) 4 MG tablet Take 1 tablet (4 mg total) by mouth every 8 (eight) hours as needed (muscle tension).    TRESIBA FLEXTOUCH U-100 100 unit/mL (3 mL) insulin pen Inject 32 Units into the skin once daily.    UNABLE TO FIND Take 1 tablet by mouth 2 (two) times a day. medication name: Magnesium 400mg, Calcium 1000 mg, D3 15mcg, Zinc 15mg    valACYclovir (VALTREX) 500 MG tablet Take 1 " tablet (500 mg total) by mouth once daily.    valsartan (DIOVAN) 160 MG tablet Take 1 tablet (160 mg total) by mouth once daily.     Current Facility-Administered Medications   Medication    capsaicin-skin cleanser patch 4 patch       REVIEW OF SYSTEMS:    GENERAL:  No weight loss, malaise or fevers.  HEENT:   No recent changes in vision or hearing  NECK:  Negative for lumps, no difficulty with swallowing.  RESPIRATORY:  Negative for cough, wheezing or shortness of breath, patient denies any recent URI.  CARDIOVASCULAR:  Negative for chest pain, leg swelling or palpitations.  GI:  Negative for abdominal discomfort, blood in stools or black stools or change in bowel habits.  MUSCULOSKELETAL:  See HPI.  SKIN:  Negative for lesions, rash, and itching.  PSYCH:  No mood disorder or recent psychosocial stressors.  Patients sleep is not disturbed secondary to pain.  HEMATOLOGY/LYMPHOLOGY:  Negative for prolonged bleeding, bruising easily or swollen nodes.  Patient is not currently taking any anti-coagulants  NEURO:   No history of headaches, syncope, paralysis, seizures or tremors.  All other reviewed and negative other than HPI.    OBJECTIVE:    LMP  (LMP Unknown)     PHYSICAL EXAMINATION:    GENERAL: Well appearing, in no acute distress, alert and oriented x3.  PSYCH:  Mood and affect appropriate.  SKIN: Skin color, texture, turgor normal, no rashes or lesions.  HEAD/FACE:  Normocephalic, atraumatic. Cranial nerves grossly intact.  CV: RRR with palpation of the radial artery.  PULM: CTAB. No evidence of respiratory difficulty, symmetric chest rise.  GI:  Soft and non-tender. Obese    BACK: limited by mobility  - No obvious deformity or signs of trauma, Normal lumbar lordotic curve  - Negative spinous process tenderness  - Positive paravertebral tenderness  - Positive pain to palpation over the facet joints of the lumbar spine.   - Negative QL / Iliac crest / Glut tenderness  - Slump test is Negative for radicular pain  -  Slump test is Positive for back pain  - Supine Straight leg raising is Did not perform for radicular pain  - Supine Straight leg raising is Did not perform for back pain  - Lumbar ROM is diminished in Flexion without pain  - Lumbar ROM is diminished in Extension with pain  - Lumbar ROM is diminished in Lateral Flexion with pain    - Did not perform Sustained Hip Flexion test (for discogenic pain)  - Positive Altered Gait, Posture  - Axial facet loading test Did not perform on the bilateral side(s)    SI Joint exam:  - Negative SI joint tenderness to palpation  - Seven's sign Did not perform  - Yeoman's Test: Did not perform for SI joint pain indicating anterior SI ligament involvement. Did not perform for anterior thigh pain/paresthesia which indicates femoral nerve stretch.  - Gaenslen's Test:Unable to Perform  - Finger Josephine's Sign:Negative  - SI compression test:Did not perform  - SI distraction test:Did not perform  - Thigh Thrust: Did not perform  - SI Thrust: Did not perform    MUSKULOSKELETAL:    EXTREMITIES:   Hip Exam:  - Log Roll Did not perform  - FADIR Did not perform  - Stinchfield Did not perform  - Hip Scour Did not perform  - GTB Tenderness Did not perform    NEUROLOGICAL EXAM:  MENTAL STATUS: A x O x 3, good concentration, speech is fluent and goal directed  MEMORY: recent and remote are intact  CN: CN2-12 grossly intact  MOTOR: 5/5 in all muscle groups of LE  DTRs: 0+ intact symmetric

## 2025-01-02 ENCOUNTER — HOSPITAL ENCOUNTER (OUTPATIENT)
Dept: RADIOLOGY | Facility: HOSPITAL | Age: 75
Discharge: HOME | End: 2025-01-02
Attending: STUDENT IN AN ORGANIZED HEALTH CARE EDUCATION/TRAINING PROGRAM
Payer: MEDICARE

## 2025-01-02 DIAGNOSIS — M47.816 LUMBAR SPONDYLOSIS: ICD-10-CM

## 2025-01-02 DIAGNOSIS — M80.88XA OSTEOPOROTIC COMPRESSION FRACTURE OF VERTEBRA, INITIAL ENCOUNTER: ICD-10-CM

## 2025-01-02 DIAGNOSIS — M54.16 LUMBAR RADICULOPATHY: ICD-10-CM

## 2025-01-02 PROCEDURE — 72148 MRI LUMBAR SPINE W/O DYE: CPT | Mod: 26,,, | Performed by: RADIOLOGY

## 2025-01-02 PROCEDURE — 72148 MRI LUMBAR SPINE W/O DYE: CPT | Mod: TC

## 2025-01-03 ENCOUNTER — TELEPHONE (OUTPATIENT)
Dept: PAIN MEDICINE | Facility: CLINIC | Age: 75
End: 2025-01-03
Payer: MEDICARE

## 2025-01-03 DIAGNOSIS — M54.14 THORACIC RADICULOPATHY: Primary | ICD-10-CM

## 2025-01-03 NOTE — TELEPHONE ENCOUNTER
----- Message from Myron Guerra DO sent at 1/3/2025  1:22 PM CST -----  Regarding: Order for HARVEY KING    Patient Name: HARVEY KING([id number])  Sex: Female  : 1950      PCP: SAMIA SAM    Center: [facility] CENTRAL BILLING OFFICE     Types of orders made on 2025: Procedure Request    Order Date:1/3/2025  Ordering User:MYRON GUERRA [[id number]]  Encounter Provider:Myron Guerra DO [[id number]]  Authorizing Provider: Myron Guerra DO [[id number]]  Department:[facility][[id number]]    Common Order Information  Procedure -> Transforaminal Injection (Specify level and laterality)     Pre-op Diagnosis -> Thoracic radiculopathy     Order Specific Information  Order: Procedure Order to Pain Management [Custom: [id number]]  Order #:          [id number]: 1 FUTURE    Priority: Routine  Class: Clinic Performed    Future Order Information      Expires on:2026            Expected by:2025                   Comment:25 - b/l T12-L1 TFESI - RN sed - stop ASA - stop ozempic    Associated Diagnoses      M54.14 Thoracic radiculopathy      Physician -> bobu         Is patient on anti-coagulants? -> No Cmt: stop ASA        Facility Name: -> [facility]           Priority: Routine  Class: Clinic Performed    Future Order Information      Expires on:2026            Expected by:2025                   Comment:25 - b/l T12-L1 TFESI - RN sed - stop ASA - stop ozempic    Associated Diagnoses      M54.14 Thoracic radiculopathy      Procedure -> Transforaminal Injection (Specify level and laterality)         Physician -> bobu         Is patient on anti-coagulants? -> No Cmt: stop ASA        Pre-op Diagnosis -> Thoracic radiculopathy         Facility Name: -> [facility]

## 2025-01-09 ENCOUNTER — CLINICAL SUPPORT (OUTPATIENT)
Dept: ALLERGY | Facility: CLINIC | Age: 75
End: 2025-01-09
Payer: MEDICARE

## 2025-01-09 DIAGNOSIS — J30.9 CHRONIC ALLERGIC RHINITIS: Primary | ICD-10-CM

## 2025-01-09 PROCEDURE — 95115 IMMUNOTHERAPY ONE INJECTION: CPT | Mod: S$GLB,,, | Performed by: STUDENT IN AN ORGANIZED HEALTH CARE EDUCATION/TRAINING PROGRAM

## 2025-01-14 DIAGNOSIS — N39.0 RECURRENT UTI: ICD-10-CM

## 2025-01-14 DIAGNOSIS — N95.2 VAGINAL ATROPHY: ICD-10-CM

## 2025-01-15 RX ORDER — ESTRADIOL 2 MG/1
SYSTEM VAGINAL
Qty: 1 EACH | Refills: 0 | Status: SHIPPED | OUTPATIENT
Start: 2025-01-15

## 2025-01-16 ENCOUNTER — PATIENT MESSAGE (OUTPATIENT)
Dept: UROLOGY | Facility: CLINIC | Age: 75
End: 2025-01-16
Payer: MEDICARE

## 2025-01-20 ENCOUNTER — PATIENT MESSAGE (OUTPATIENT)
Dept: PRIMARY CARE CLINIC | Facility: CLINIC | Age: 75
End: 2025-01-20
Payer: MEDICARE

## 2025-01-21 ENCOUNTER — OFFICE VISIT (OUTPATIENT)
Dept: PRIMARY CARE CLINIC | Facility: CLINIC | Age: 75
End: 2025-01-21
Payer: MEDICARE

## 2025-01-21 ENCOUNTER — TELEPHONE (OUTPATIENT)
Dept: PRIMARY CARE CLINIC | Facility: CLINIC | Age: 75
End: 2025-01-21

## 2025-01-21 DIAGNOSIS — Z79.4 TYPE 2 DIABETES MELLITUS WITH OTHER NEUROLOGIC COMPLICATION, WITH LONG-TERM CURRENT USE OF INSULIN: ICD-10-CM

## 2025-01-21 DIAGNOSIS — E11.49 TYPE 2 DIABETES MELLITUS WITH OTHER NEUROLOGIC COMPLICATION, WITH LONG-TERM CURRENT USE OF INSULIN: ICD-10-CM

## 2025-01-21 DIAGNOSIS — E11.22 TYPE 2 DIABETES MELLITUS WITH STAGE 3A CHRONIC KIDNEY DISEASE, WITH LONG-TERM CURRENT USE OF INSULIN: ICD-10-CM

## 2025-01-21 DIAGNOSIS — I10 ESSENTIAL HYPERTENSION: Primary | ICD-10-CM

## 2025-01-21 DIAGNOSIS — Z79.4 TYPE 2 DIABETES MELLITUS WITH STAGE 3A CHRONIC KIDNEY DISEASE, WITH LONG-TERM CURRENT USE OF INSULIN: ICD-10-CM

## 2025-01-21 DIAGNOSIS — N18.31 TYPE 2 DIABETES MELLITUS WITH STAGE 3A CHRONIC KIDNEY DISEASE, WITH LONG-TERM CURRENT USE OF INSULIN: ICD-10-CM

## 2025-01-21 DIAGNOSIS — E11.42 DIABETIC POLYNEUROPATHY ASSOCIATED WITH TYPE 2 DIABETES MELLITUS: ICD-10-CM

## 2025-01-21 DIAGNOSIS — E03.9 HYPOTHYROIDISM, UNSPECIFIED TYPE: ICD-10-CM

## 2025-01-21 PROBLEM — N18.32 CKD STAGE 3B, GFR 30-44 ML/MIN: Status: RESOLVED | Noted: 2018-02-23 | Resolved: 2025-01-21

## 2025-01-21 RX ORDER — SEMAGLUTIDE 1.34 MG/ML
1 INJECTION, SOLUTION SUBCUTANEOUS
Qty: 3 ML | Refills: 11 | Status: SHIPPED | OUTPATIENT
Start: 2025-01-21 | End: 2026-01-21

## 2025-01-21 NOTE — ASSESSMENT & PLAN NOTE
Last A1c of 6.3. well controlled on on metformin 1000 mg bid, ozempic 0.5 mg weekly, glipizide 5 mg bid, tresiba 25 U daily. No side effects from ozempic. Will increase dose to 1 mg weekly. Asked pt to cut back on tresiba to 15 units when she increased ozempic dose. Can tirate up or lower if needed. Being monitored by digital medicine.

## 2025-01-21 NOTE — ASSESSMENT & PLAN NOTE
on valsartan 160 mg daily and aldactone 50 mg every other day. She is on digital medicine. will monitor. continue

## 2025-01-21 NOTE — PROGRESS NOTES
Clinic Note  1/21/2025      Subjective:       Patient ID:  Vivienne is a 74 y.o. female being seen for an established visit.    Chief Complaint: No chief complaint on file.    HPI  Vivienne Jose is a 74 y.o.  female who presents with HTN, type 2 DM, HLD, DEL RIO, hypothyroidism (s/p thyroidectomy due to cysts), nephrolithiasis, recurrent UTIs (sees dr. Cee), sleep apnea, neuropathy is here for a f/u visit. Did acp 2024  -has ELANA on jan 28th.   -BP Is good.   -ordered labs for march. She will schedule.       Review of Systems   Constitutional:  Negative for chills and fever.   HENT:  Negative for congestion.    Respiratory:  Negative for cough and shortness of breath.    Cardiovascular:  Negative for chest pain and palpitations.   Gastrointestinal:  Negative for abdominal pain, blood in stool, constipation and diarrhea.   Genitourinary:  Negative for dysuria and hematuria.   Neurological:  Negative for dizziness, weakness and headaches.       Medication List with Changes/Refills   New Medications    SEMAGLUTIDE (OZEMPIC) 1 MG/DOSE (4 MG/3 ML)    Inject 1 mg into the skin every 7 days.   Current Medications    ABALOPARATIDE (TYMLOS) 80 MCG (3,120 MCG/1.56 ML) PNIJ    Inject 0.04 mLs (80 mcg total) into the skin once daily.    ASCORBIC ACID, VITAMIN C, (VITAMIN C) 100 MG TABLET    Take 500 mg by mouth 2 (two) times daily.     ASPIRIN (ECOTRIN) 81 MG EC TABLET    Take 81 mg by mouth once daily.    AZELASTINE (ASTELIN) 137 MCG (0.1 %) NASAL SPRAY    2 sprays (274 mcg total) by Nasal route 2 (two) times daily.    BETAMETHASONE DIPROPIONATE (DIPROLENE) 0.05 % OINTMENT    Apply topically 2 (two) times daily. Use to affected areas for up to 2 weeks then take a 1 week break or decrease to 3 times weekly. Do not apply to groin or face. Use to spot on ear when flaring    BLOOD SUGAR DIAGNOSTIC (TRUE METRIX GLUCOSE TEST STRIP) STRP    TEST TWO TIMES DAILY    BLOOD-GLUCOSE METER MISC    [employer] True Metrix Air meter     "DULOXETINE (CYMBALTA) 60 MG CAPSULE    Take 1 capsule (60 mg total) by mouth once daily.    ESTRADIOL (ESTRING) 2 MG (7.5 MCG /24 HOUR) VAGINAL RING    Remove old Estring, place the new one every 3 months.    FLUTICASONE PROPIONATE (FLONASE) 50 MCG/ACTUATION NASAL SPRAY    2 sprays (100 mcg total) by Each Nostril route once daily.    FUROSEMIDE (LASIX) 20 MG TABLET    Take 1 tablet (20 mg total) by mouth once daily.    GABAPENTIN (NEURONTIN) 600 MG TABLET    Take 1 tablet (600 mg total) by mouth 2 (two) times daily.    GLIPIZIDE (GLUCOTROL) 5 MG TABLET    TAKE 1 TABLET TWICE DAILY BEFORE MEALS    IPRATROPIUM (ATROVENT) 42 MCG (0.06 %) NASAL SPRAY    2 sprays by Each Nostril route 4 (four) times daily.    KETOTIFEN (ZADITOR) 0.025 % (0.035 %) OPHTHALMIC SOLUTION    Place 1-2 drops into both eyes once daily.    L.ACID-B.BIFIDUM-B.ANIMAL-FOS 25 BILLION CELL -100 MG CAP    Take 1 capsule by mouth once daily.    LANCETS (TRUEPLUS LANCETS) 28 GAUGE MISC    Inject 1 lancet into the skin 2 (two) times daily before meals.    LEVOTHYROXINE (SYNTHROID) 75 MCG TABLET    Take 1 tablet (75 mcg total) by mouth once daily.    LORATADINE (CLARITIN) 10 MG TABLET    Take 10 mg by mouth once daily.    LOVASTATIN (MEVACOR) 40 MG TABLET    TAKE 1 TABLET NIGHTLY (SUBSTITUTED FOR MEVACOR)    METFORMIN (GLUCOPHAGE) 1000 MG TABLET    Take 1 tablet (1,000 mg total) by mouth 2 (two) times daily with meals.    MV-MN/FOLIC ACID/VIT [id number] (ALIVE ONCE DAILY WOMEN 50 PLUS ORAL)    Take 1 tablet by mouth once daily.    OMEPRAZOLE (PRILOSEC) 20 MG CAPSULE    TAKE 1 CAPSULE BY MOUTH DAILY AS NEEDED (ACID REFLUX).    PEN NEEDLE, DIABETIC (BD ULTRA-FINE EDUARDO PEN NEEDLE) 32 GAUGE X 5/32" NDLE    Inject 1 Needle into the skin once daily.    SPIRONOLACTONE (ALDACTONE) 50 MG TABLET    Take 1 tablet (50 mg total) by mouth every other day.    TIZANIDINE (ZANAFLEX) 4 MG TABLET    Take 1 tablet (4 mg total) by mouth every 8 (eight) hours as needed (muscle " "tension).    TRESIBA FLEXTOUCH U-100 100 UNIT/ML (3 ML) INSULIN PEN    Inject 32 Units into the skin once daily.    UNABLE TO FIND    Take 1 tablet by mouth 2 (two) times a day. medication name: Magnesium 400mg, Calcium 1000 mg, D3 15mcg, Zinc 15mg    VALACYCLOVIR (VALTREX) 500 MG TABLET    Take 1 tablet (500 mg total) by mouth once daily.    VALSARTAN (DIOVAN) 160 MG TABLET    Take 1 tablet (160 mg total) by mouth once daily.   Discontinued Medications    OZEMPIC 0.25 MG OR 0.5 MG (2 MG/3 ML) PEN INJECTOR    INJECT 0.5MG UNDER THE SKIN EVERY 7 DAYS (REPLACES RYBELSUS)           Objective:      LMP  (LMP Unknown)   Estimated body mass index is 41.98 kg/m² as calculated from the following:    Height as of 12/20/24: 5' 2" (1.575 m).    Weight as of 12/20/24: 104.1 kg (229 lb 8 oz).  Physical Exam  Constitutional:       Appearance: Normal appearance.   Eyes:      Conjunctiva/sclera: Conjunctivae normal.   Pulmonary:      Effort: Pulmonary effort is normal. No respiratory distress.   Neurological:      Mental Status: She is alert and oriented to person, place, and time.   Psychiatric:         Mood and Affect: Mood normal.         Behavior: Behavior normal.           Assessment and Plan:     1. Essential hypertension  Assessment & Plan:  on valsartan 160 mg daily and aldactone 50 mg every other day. She is on digital medicine. will monitor. continue    Orders:  -     Basic Metabolic Panel; Future; Expected date: 01/21/2025  -     CBC Without Differential; Future; Expected date: 01/21/2025    2. Type 2 diabetes mellitus with other neurologic complication, with long-term current use of insulin  -     Lipid Panel; Future; Expected date: 01/21/2025  -     Hemoglobin A1C; Future; Expected date: 01/21/2025  -     Microalbumin/Creatinine Ratio, Urine; Future; Expected date: 01/21/2025    3. Hypothyroidism, unspecified type  Assessment & Plan:  Euthyroid. Continue levothyroxine 75 mcg daily. Will monitor. Ordered labs today for " march.     Orders:  -     TSH; Future; Expected date: 01/21/2025    4. Type 2 diabetes mellitus with stage 3a chronic kidney disease, with long-term current use of insulin  Assessment & Plan:  Last A1c of 6.3. well controlled on on metformin 1000 mg bid, ozempic 0.5 mg weekly, glipizide 5 mg bid, tresiba 25 U daily. No side effects from ozempic. Will increase dose to 1 mg weekly. Asked pt to cut back on tresiba to 15 units when she increased ozempic dose. Can tirate up or lower if needed. Being monitored by digital medicine.      5. Diabetic polyneuropathy associated with type 2 diabetes mellitus  Assessment & Plan:  On gabapentin and duloxetine to help with neuropathy. Doing well. continue      Other orders  -     semaglutide (OZEMPIC) 1 mg/dose (4 mg/3 mL); Inject 1 mg into the skin every 7 days.  Dispense: 3 mL; Refill: 11             Follow Up:   Follow up in about 3 months (around 4/21/2025).        Nalini Hall      The patient location is: home  The chief complaint leading to consultation is: diabetes, HTN    Visit type: audiovisual    Face to Face time with patient: 15  15 minutes of total time spent on the encounter, which includes face to face time and non-face to face time preparing to see the patient (eg, review of tests), Obtaining and/or reviewing separately obtained history, Documenting clinical information in the electronic or other health record, Independently interpreting results (not separately reported) and communicating results to the patient/family/caregiver, or Care coordination (not separately reported).         Each patient to whom he or she provides medical services by telemedicine is:  (1) informed of the relationship between the physician and patient and the respective role of any other health care provider with respect to management of the patient; and (2) notified that he or she may decline to receive medical services by telemedicine and may withdraw from such care at any  time.

## 2025-01-24 NOTE — PRE-PROCEDURE INSTRUCTIONS
Patient reviewed on 1/24/2025.  Okay to proceed at [facility]. The following pre-procedure instructions and arrival time have been sent to patient portal for review.  Patient confirmed receiving pre-procedure instructions via [facility] portal.     Dear Vivienne ,     Please read over the following pre-procedure instructions in it's entirety as there is helpful information here to get you well prepared for your upcoming procedure.     You are scheduled for a procedure with Dr. Mireles on 1/28/25.     [facility] is located at the corner of [facility] and [facility]. It is in the [place] next to [facility] The address is: [address] [place] [address]. Take the elevator to the 2nd floor.      Registration check in time: 8:30 am  Scheduled procedure time: 9:30 am     You are scheduled to receive:_______Oral sedation                                                                 ___X____IV Sedation                                                                 _______No Sedation                                                                                           If you are receiving any sedation, you CANNOT drive yourself and must have a responsible friend or family member (no rideshare) to drive you home.     You should take any medications that you routinely take for blood pressure, heart medications, thyroid, cholesterol, etc.      *The fasting restrictions are dependent on whether or not you are receiving sedation. Sedation is not available for all procedures.      Your fasting instructions for sedation patients are as follow:  IV sedation. Nothing to eat after midnight the night prior to procedure. Patients are encouraged to consume clear liquids up to 2 hours prior to scheduled arrival time. -Clear liquids include Gatorade, water, soda, black coffee or tea (no milk or creamer), and clear juices. - Clear liquids do NOT include anything with pulp or food particles (chicken broth, ice  cream, yogurt, Jello, etc.) You CANNOT drive yourself and must have a .            If you are on blood thinners, you need to follow the anticoagulation instructions that had been discussed previously. You should only stop the blood thinners if it was approved by your primary care physician or your cardiologist. In the event that you are not able to stop your blood thinners, a blood thinner was not listed on your medication list, or we were not able to get clearance from your cardiologist, then the procedure may have to be postponed/canceled.      IF you were told to stop your blood thinners, this is how long you should generally hold some of the more common ones. Remember that stopping blood thinners is only necessary for certain procedures. If you are unsure of your instructions, please call us.   Aspirin - 5 days  Plavix/Clopidogrel - 7 days  Warfarin / Coumadin - 5 days  Eliquis - 3 days  Pradaxa/Dabigatran - 4 days  Xarelto/Rivaroxaban - 3 days        HOLD all non-insulin injections (shots) until after surgery (Ozempic, Mounjaro, Trulicity, Victoza, Byetta, Wegovy and Adlyxin) (Total of 7 days prior)        If you are a diabetic, do not take your medication if you will be fasting, but bring it with you. Please plan on being here for roughly 2-3 hours. Please note that most procedures will not be performed if you blood sugar is >200.     Please call us if you have been sick (running fever, having any flu-like symptoms) or have been taking ANTIBIOTICS in the past 2 weeks or had any outpatient procedures other than with us (colonoscopy, endoscopy, OBGYN, dental, etc.).      If you have been previously COVID positive, you will need to hold off on your procedure until you are symptom free for 10 days. If you did not have any symptoms, you can have your procedure 10 days from your positive test result.         On the morning of your procedure:  *HOLD ALL VITAMINS, MINERALS, HERBS (INCLUDING HERBAL TEAS) AND  SUPPLEMENTS  *SHOWER WITH ANTIBACTERIAL SOAP (EX. DIAL) NIGHT BEFORE AND MORNING OF PROCEDURE  *DO NOT APPLY ANY LOTIONS, OILS, POWDERS, PERFUME/COLOGNE, OINTMENTS, GELS, CREAMS, MAKEUP OR DEODORANT TO YOUR SKIN MORNING OF PROCEDURE  *LEAVE JEWELRY AND ANY VALUABLES AT HOME  *WEAR LOOSE COMFORTABLE CLOTHING      In the event that you are running late or need to reschedule on the day of your procedure, please contact the pre-op desk at [phone].       Please reply to this portal message as receipt of delivery.     Thank you,  [facility]  [place], MINERVAN  [facility]  Pre-Admit

## 2025-01-27 ENCOUNTER — PATIENT MESSAGE (OUTPATIENT)
Dept: PODIATRY | Facility: CLINIC | Age: 75
End: 2025-01-27
Payer: MEDICARE

## 2025-01-28 ENCOUNTER — HOSPITAL ENCOUNTER (OUTPATIENT)
Facility: HOSPITAL | Age: 75
Discharge: HOME | End: 2025-01-28
Attending: STUDENT IN AN ORGANIZED HEALTH CARE EDUCATION/TRAINING PROGRAM | Admitting: STUDENT IN AN ORGANIZED HEALTH CARE EDUCATION/TRAINING PROGRAM
Payer: MEDICARE

## 2025-01-28 VITALS
RESPIRATION RATE: 12 BRPM | HEIGHT: 62 IN | DIASTOLIC BLOOD PRESSURE: 49 MMHG | TEMPERATURE: 98 F | WEIGHT: 229 LBS | SYSTOLIC BLOOD PRESSURE: 93 MMHG | BODY MASS INDEX: 42.14 KG/M2 | HEART RATE: 72 BPM | OXYGEN SATURATION: 93 %

## 2025-01-28 DIAGNOSIS — G89.29 CHRONIC PAIN: ICD-10-CM

## 2025-01-28 DIAGNOSIS — M54.16 LUMBAR RADICULOPATHY: Primary | ICD-10-CM

## 2025-01-28 LAB — POCT GLUCOSE: 111 MG/DL (ref 70–110)

## 2025-01-28 PROCEDURE — 82962 GLUCOSE BLOOD TEST: CPT | Performed by: STUDENT IN AN ORGANIZED HEALTH CARE EDUCATION/TRAINING PROGRAM

## 2025-01-28 PROCEDURE — 64483 NJX AA&/STRD TFRM EPI L/S 1: CPT | Mod: 50,,, | Performed by: STUDENT IN AN ORGANIZED HEALTH CARE EDUCATION/TRAINING PROGRAM

## 2025-01-28 PROCEDURE — 99152 MOD SED SAME PHYS/QHP 5/>YRS: CPT | Performed by: STUDENT IN AN ORGANIZED HEALTH CARE EDUCATION/TRAINING PROGRAM

## 2025-01-28 PROCEDURE — 64483 NJX AA&/STRD TFRM EPI L/S 1: CPT | Mod: 50 | Performed by: STUDENT IN AN ORGANIZED HEALTH CARE EDUCATION/TRAINING PROGRAM

## 2025-01-28 PROCEDURE — 25500020 PHARM REV CODE 255: Performed by: STUDENT IN AN ORGANIZED HEALTH CARE EDUCATION/TRAINING PROGRAM

## 2025-01-28 PROCEDURE — 63600175 PHARM REV CODE 636 W HCPCS: Performed by: STUDENT IN AN ORGANIZED HEALTH CARE EDUCATION/TRAINING PROGRAM

## 2025-01-28 RX ORDER — MIDAZOLAM HYDROCHLORIDE 1 MG/ML
INJECTION INTRAMUSCULAR; INTRAVENOUS
Status: DISCONTINUED | OUTPATIENT
Start: 2025-01-28 | End: 2025-01-28 | Stop reason: HOSPADM

## 2025-01-28 RX ORDER — SODIUM CHLORIDE 9 MG/ML
500 INJECTION, SOLUTION INTRAVENOUS CONTINUOUS
Status: DISCONTINUED | OUTPATIENT
Start: 2025-01-28 | End: 2025-01-28 | Stop reason: HOSPADM

## 2025-01-28 RX ORDER — LIDOCAINE HYDROCHLORIDE 10 MG/ML
1 INJECTION, SOLUTION EPIDURAL; INFILTRATION; INTRACAUDAL; PERINEURAL ONCE
Status: DISCONTINUED | OUTPATIENT
Start: 2025-01-28 | End: 2025-01-28

## 2025-01-28 RX ORDER — LIDOCAINE HYDROCHLORIDE 20 MG/ML
INJECTION, SOLUTION EPIDURAL; INFILTRATION; INTRACAUDAL; PERINEURAL
Status: DISCONTINUED | OUTPATIENT
Start: 2025-01-28 | End: 2025-01-28 | Stop reason: HOSPADM

## 2025-01-28 RX ORDER — DEXAMETHASONE SODIUM PHOSPHATE 10 MG/ML
INJECTION, SOLUTION INTRA-ARTICULAR; INTRALESIONAL; INTRAMUSCULAR; INTRAVENOUS; SOFT TISSUE
Status: DISCONTINUED | OUTPATIENT
Start: 2025-01-28 | End: 2025-01-28 | Stop reason: HOSPADM

## 2025-01-28 RX ORDER — LIDOCAINE HYDROCHLORIDE 10 MG/ML
INJECTION, SOLUTION EPIDURAL; INFILTRATION; INTRACAUDAL; PERINEURAL
Status: DISCONTINUED | OUTPATIENT
Start: 2025-01-28 | End: 2025-01-28 | Stop reason: HOSPADM

## 2025-01-28 RX ORDER — FENTANYL CITRATE 50 UG/ML
INJECTION, SOLUTION INTRAMUSCULAR; INTRAVENOUS
Status: DISCONTINUED | OUTPATIENT
Start: 2025-01-28 | End: 2025-01-28 | Stop reason: HOSPADM

## 2025-01-28 NOTE — DISCHARGE INSTRUCTIONS
[facility]  [employer] service # [phone]  On-call pager for emergency# [phone]     POST-PROCEDURE INSTRUCTIONS:    Today you had an injection that included a steroid medications.  The steroid may or may not have been mixed with a local anesthetic when it was injected.   If the injection was in the neck, you may feel some pressure, numbness, or slight weakness in the arm after the procedure for a short period of time (this is a normal response), if this persists for longer than 1 day please contact our office or go to the emergency room.  If the injection was in the low back, you may feel some pressure, numbness, or slight weakness in the leg after the procedure for a short period of time (this is a normal response), if this persists for longer than 1 day please contact our office or go to the emergency room.  You may get side effects from the steroid.  This is not uncommon.  Symptoms include: elevated blood sugar, elevated blood pressure, headache, flushing, nausea, insomnia.  These symptoms are transient and will resolve within 1-3 days.  If symptoms last longer than this please contact our office or head to the emergency room.  Steroid medications can take anywhere from 3-14 days to take effect (rarely longer).  You may notice that your pain worsens for a short period of time after the injection, this would not be unusual due to the pressure and trauma from the needle.    If you do not have a follow up appointment scheduled, please contact my office (or the office of the physician who referred you for the procedure) to get a post-procedure follow up scheduled 2-4 weeks after the procedure.  This can be done as a virtual visit if that is more convenient for you.      What you need to do:    Keep a record of your response to the injection you had today.    How much relief did you get?   When did the relief start and how long did it last?  Were you  able to decrease the use of any of your pain medications?  Were you able to increase your level of activity?  How long did the relief last?    What to watch out for:    If you experience any of the following symptoms after your procedure, please notify the messaging service immediately (see above for contact information):   fever (increased oral temperature)   bleeding or swelling at the injection site,    drainage, rash or redness at the injection site    possible signs of infection    increased pain at the injection site   worsening of your usual pain   severe headache   new or worsening numbness    new arm and/or leg weakness, or    changes in bowel and/or bladder function: urinating or defecating on yourself and not knowing that you did it.    PLEASE FOLLOW ALL INSTRUCTIONS CAREFULLY     Do not engage in strenuous activity (e.g., lifting or pushing heavy objects or repeated bending) for 24 hours.     Do not take a bath, swim or use Jacuzzi for 24 hours after procedure. (A shower is fine).   Remove any Band-Aids when you get home.    Use cold/ice, as needed for comfort.  We recommend the use of cold therapy alternating on for 20 minutes, off for 20 minutes.    Do not apply direct heat (heating pad or heat packs) to the injection site for 24 hours.     Resume your usual medications, unless instructed otherwise by your Pain Physician.     If you are on warfarin (Coumadin) or other blood thinner, resume this medication as instructed by your prescribing Physician.    IF AT ANY POINT YOU ARE VERY CONCERNED ABOUT YOUR SYMPTOMS, PLEASE GO TO THE EMERGENCY ROOM.    If you develop worsening pain, weakness, numbness, lose bowel or bladder control (i.e., having an accident where you did not even know you had to go to the bathroom and suddenly noticed you soiled yourself), saddle anesthesia (a loss of sensation restricted to the area of the buttocks, anus and between the legs -- i.e., those parts of your body that would  touch a saddle if you were sitting on one) you need to go immediately to the emergency department for evaluation and treatment.    ----------------------------------------------------------------------------------------------------------------------------------------------------------------  If you received Sedation please read the following instructions:  POST SEDATION INSTRUCTIONS    Today you received intravenous medication (also known as sedation) that was used to help you relax and/or decrease discomfort during your procedure. This medication will be acting in your body for the next 24 hours, so you might feel a little tired or sleepy. This feeling will slowly wear off.   Common side effects associated with these medications include: drowsiness, dizziness, sleepiness, confusion, feeling excited, difficulty remembering things, lack of steadiness with walking or balance, loss of fine muscle control, slowed reflexes, difficulty focusing, and blurred vision.  Some over-the-counter and prescription medications (e.g., muscle relaxants, opioids, mood-altering medications, sedatives/hypnotics, antihistamines) can interact with the intravenous medication you received and cause an increased risk of the side effects listed above in addition to other potentially life threatening side effects. Use extreme caution if you are taking such medications, and consult with your Pain Physician or prescribing physician if you have any questions.  For the next 12-24 hours:    DO NOT--Drive a car, operate machinery or power tools   DO NOT--Drink any alcoholic beverages (not even beer), they may dangerously increase the risk of side effects.    DO NOT--Make any important legal or business decisions or sign important documents.  We advise you to have someone to assist you at home. Move slowly and carefully. Do not make sudden changes in position. Be aware of dizziness or light-headedness and move accordingly.   If you seek medical  treatment within 24 hours, let the nurse or doctor caring for you know that you have received the above medications. If you have any questions or concerns related to your sedation or treatment today please contact us.

## 2025-01-28 NOTE — OP NOTE
"PROCEDURE: bilateral Lumbar L1-2 Transforaminal Epidural Steroid Injection    Patient Name: Vivienne Jose  MRN: [id number]    PROCEDURE DATE: 1/28/2025    INJECTION # 1    DIAGNOSIS: Lumbar Radiculopathy  CPT CODE: 46238 (INJECTION(S), ANESTHETIC AGENT(S) AND/OR STEROID; TRANSFORAMINAL EPIDURAL, WITH IMAGING GUIDANCE (FLUOROSCOPY OR CT), LUMBAR OR SACRAL, SINGLE LEVEL), 32627 (Each additional level).     POSTPROCEDURE DIAGNOSIS: Same    PHYSICIAN: Myron Ocasio DO  NEEDLE TYPE: - 22G 5" Spinal Needle  MEDICATIONS INJECTED: 6ml mixture of 1ml Dexamethasone 10mg/ml and 5ml 1% lidocaine PF split equally between each site.  CONTRAST: Omni 300    Sedation Medications - Mild Sedation with 2md Versed and 50mcg Fentanyl    Estimated Blood Loss - <2ml  Drains: None  Specimens Removed: None  Urine Output - Not Measured  Complications: could not visualize the T12-L1 space safely so injected the L1-2 space  Outcome: Good    Informed Consent:  The patient's condition and proposed procedures, risks, and alternatives were discussed with the patient or responsible party.  The patient's / responsible party's questions were answered.   The patient / responsible party appeared to understand and chose to proceed.  Informed consent was obtained.  After obtaining written consent, an IV hep lock was placed. (See nurses notes for details).     Procedure in Detail:  The patient was taken back to the OR suite and placed in a prone position. The skin overlying the injection site was prepped and draped in an aseptic fashion. The target injection site (see above) was identified with fluoroscopy.     Procedural Pause:  A procedural pause verifying correct patient, medical record number, allergies, medications to be administered, current vital signs, and surgical site was performed immediately prior to beginning the procedure.    The skin and subcutaneous tissue overlying the target site(s) of injection for the L1-2 transforaminal epidural " steroid injection was/were anesthetized using 4 mL of 1% lidocaine with a 25-gauge, 1½-inch needle.      The fluoroscopic beam was aligned to create a tunnel view.  The above noted needle was advanced parallel to the fluoroscopic beam towards the above noted foramen under fluoroscopic guidance.  The final position of the needle(s) was identified using AP and lateral views.  Paresthesias were not noted with final needle positioning.       A microbore extension tubing was attached to the needle to minimize any movement of the needle during injection or syringe change.  After negative aspiration for heme or CSF at each site(s) where the needle(s) was placed, 0.5ml of contrast dye was injected to confirm appropriate placement and that there was no vascular uptake.  Pain provocation by the injected contrast material was not noted.  Then the injectate solution described above was injected in increments.  The needle was then retracted approximately nursing home and the needle track was flushed with 0.5 mL of Lidocaine 1%.  The needle(s) was then removed.     The same procedural technique outlined above was repeated on the OPPOSITE side.    The heart rate, pulse oximetry, and blood pressure were continuously monitored throughout the procedure.  The procedure was well tolerated. She was carefully escorted to the recovery room in stable condition. Patient was monitored by RN for recovery period.  The patient will be contacted in the next few days to determine extent of relief.  Patient was given post procedure and discharge instructions to follow at home.  The patient was discharged in a stable condition.    Note Electronically Signed By:  Myron Wang  01/28/2025

## 2025-01-28 NOTE — DISCHARGE SUMMARY
[facility]  Discharge Note  Short Stay    Procedure(s) (LRB):  b/l L1-l2 TFESI (Bilateral)      OUTCOME: Patient tolerated treatment/procedure well without complication and is now ready for discharge.    DISPOSITION: Home or Self Care    FINAL DIAGNOSIS:  <principal problem not specified>    FOLLOWUP: In clinic    DISCHARGE INSTRUCTIONS:  No discharge procedures on file.     TIME SPENT ON DISCHARGE: 10 minutes

## 2025-01-28 NOTE — H&P
HPI  Patient presenting for Procedure(s) (LRB):  b/l T12-L1 TFESI (Bilateral)     Patient on Anti-coagulation No, on ASA    No health changes since previous encounter    Past Medical History:   Diagnosis Date    Allergy     Angio-edema     Arthritis     Cataract     bilateral - not removed    Cerebrovascular malformation     Cirrhosis 11/24/2020    Colon polyps     Diabetes mellitus, type 2     Diabetic peripheral neuropathy     Edema of both feet 8/19/2022    GERD (gastroesophageal reflux disease)     Herpes infection     Hypothyroidism     Kidney stones     Mild nonproliferative diabetic retinopathy of 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 edema associated with type 2 diabetes mellitus 4/18/2019    DEL RIO (nonalcoholic steatohepatitis) 8/19/2022    Nausea & vomiting 11/23/2015    Obesity, morbid     Ovarian cyst     Seizure disorder, focal motor     Sleep apnea     Type II or unspecified type diabetes mellitus with neurological manifestations, uncontrolled(250.62)     Urticaria      Past Surgical History:   Procedure Laterality Date    CARPAL TUNNEL RELEASE Right 2014    CATARACT EXTRACTION W/  INTRAOCULAR LENS IMPLANT Right 9/19/2023    Procedure: EXTRACTION, CATARACT, WITH IOL INSERTION;  Surgeon: Lyndon Ortiz MD;  Location: [facility] OR;  Service: Ophthalmology;  Laterality: Right;    CATARACT EXTRACTION W/  INTRAOCULAR LENS IMPLANT Left 10/3/2023    Procedure: EXTRACTION, CATARACT, WITH IOL INSERTION;  Surgeon: Lyndon Ortiz MD;  Location: [facility] OR;  Service: Ophthalmology;  Laterality: Left;    COLONOSCOPY      COLONOSCOPY N/A 9/13/2017    Procedure: COLONOSCOPY Golytely;  Surgeon: Gisela Wall MD;  Location: [facility];  Service: Endoscopy;  Laterality: N/A;    COLONOSCOPY N/A 9/9/2022    Procedure: COLONOSCOPY Extended Suprep;  Surgeon: Britt Jasso MD;  Location: [facility] ENDO;  Service: Endoscopy;  Laterality: N/A;    CYST REMOVAL      skin; multiples    ESOPHAGOGASTRODUODENOSCOPY Left  10/15/2021    Procedure: EGD (ESOPHAGOGASTRODUODENOSCOPY);  Surgeon: Luis Fernando Taveras MD;  Location: [facility] ENDO (4TH FLR);  Service: Endoscopy;  Laterality: Left;  cirrhosis labwork am of procedure  last seizure 1973  COVID test on 10/12/21 at [facility]    ESOPHAGOGASTRODUODENOSCOPY N/A 3/10/2023    Procedure: EGD (ESOPHAGOGASTRODUODENOSCOPY);  Surgeon: Christa Caldera MD;  Location: [facility] ENDO (2ND FLR);  Service: Endoscopy;  Laterality: N/A;  Medically Urgent  cirrohsis-labs done on 2/9/23 (< 90 days)  New onset A-fib  3/1 instructions to portal-st  Precall complete- [place]    EXTRACORPOREAL SHOCK WAVE LITHOTRIPSY  2002    HYSTERECTOMY  1984    JOINT REPLACEMENT Right 03/06/2019    knee    KIDNEY STONE SURGERY      KNEE ARTHROPLASTY Right 3/6/2019    Procedure: ARTHROPLASTY, KNEE;  Surgeon: Pj Gamboa MD;  Location: [facility] OR;  Service: Orthopedics;  Laterality: Right;  Depuy (Stephen notified 2/21, CC)    KYPHOPLASTY, SPINE, LUMBAR Bilateral 9/23/2024    Procedure: T12 and L1 SpineJack (Lakia);  Surgeon: Myron Ocasio DO;  Location: [facility] OR;  Service: Pain Management;  Laterality: Bilateral;    THYROIDECTOMY  1977         TONSILLECTOMY, ADENOIDECTOMY      TRIGGER FINGER RELEASE      TUBAL LIGATION       Review of patient's allergies indicates:   Allergen Reactions    Sulfa (sulfonamide antibiotics) Nausea Only    Ciprofloxacin     Januvia [sitagliptin]      abd pain    Lisinopril     Lotensin [benazepril]     Nexium [esomeprazole magnesium] Other (See Comments)     Gas      No current facility-administered medications for this encounter.       PMHx, PSHx, Allergies, Medications reviewed in epic    ROS negative except pain complaints in HPI    OBJECTIVE:    LMP  (LMP Unknown)     PHYSICAL EXAMINATION:    GENERAL: Well appearing, in no acute distress, alert and oriented x3.  PSYCH:  Mood and affect appropriate.  SKIN: Skin color, texture, turgor normal, no rashes or lesions which will impact the  procedure.  CV: RRR with palpation of the radial artery.  PULM: No evidence of respiratory difficulty, symmetric chest rise. Clear to auscultation.  NEURO: Cranial nerves grossly intact.    Plan:    Proceed with procedure as planned Procedure(s) (LRB):  b/l T12-L1 TFESI (Bilateral)    Aurora Hunter  01/28/2025

## 2025-01-29 ENCOUNTER — PATIENT MESSAGE (OUTPATIENT)
Dept: TRANSPLANT | Facility: CLINIC | Age: 75
End: 2025-01-29
Payer: MEDICARE

## 2025-02-04 ENCOUNTER — HOSPITAL ENCOUNTER (OUTPATIENT)
Dept: RADIOLOGY | Facility: HOSPITAL | Age: 75
Discharge: HOME | End: 2025-02-04
Attending: INTERNAL MEDICINE
Payer: MEDICARE

## 2025-02-04 DIAGNOSIS — K74.60 HEPATIC CIRRHOSIS, UNSPECIFIED HEPATIC CIRRHOSIS TYPE, UNSPECIFIED WHETHER ASCITES PRESENT: ICD-10-CM

## 2025-02-04 PROCEDURE — 76700 US EXAM ABDOM COMPLETE: CPT | Mod: 26,,, | Performed by: STUDENT IN AN ORGANIZED HEALTH CARE EDUCATION/TRAINING PROGRAM

## 2025-02-04 PROCEDURE — 76700 US EXAM ABDOM COMPLETE: CPT | Mod: TC

## 2025-02-06 ENCOUNTER — CLINICAL SUPPORT (OUTPATIENT)
Dept: ALLERGY | Facility: CLINIC | Age: 75
End: 2025-02-06
Payer: MEDICARE

## 2025-02-06 DIAGNOSIS — E11.42 TYPE 2 DIABETES MELLITUS WITH PERIPHERAL NEUROPATHY: ICD-10-CM

## 2025-02-06 DIAGNOSIS — J30.9 CHRONIC ALLERGIC RHINITIS: Primary | ICD-10-CM

## 2025-02-06 PROCEDURE — 95115 IMMUNOTHERAPY ONE INJECTION: CPT | Mod: HCNC,S$GLB,, | Performed by: STUDENT IN AN ORGANIZED HEALTH CARE EDUCATION/TRAINING PROGRAM

## 2025-02-07 RX ORDER — GABAPENTIN 600 MG/1
600 TABLET ORAL 3 TIMES DAILY
Qty: 180 TABLET | Refills: 5 | Status: SHIPPED | OUTPATIENT
Start: 2025-02-07

## 2025-02-27 ENCOUNTER — LAB VISIT (OUTPATIENT)
Dept: LAB | Facility: HOSPITAL | Age: 75
End: 2025-02-27
Attending: INTERNAL MEDICINE
Payer: MEDICARE

## 2025-02-27 ENCOUNTER — OFFICE VISIT (OUTPATIENT)
Dept: HEPATOLOGY | Facility: CLINIC | Age: 75
End: 2025-02-27
Payer: MEDICARE

## 2025-02-27 VITALS
SYSTOLIC BLOOD PRESSURE: 140 MMHG | BODY MASS INDEX: 42.84 KG/M2 | HEIGHT: 62 IN | WEIGHT: 232.81 LBS | RESPIRATION RATE: 18 BRPM | OXYGEN SATURATION: 96 % | HEART RATE: 78 BPM | DIASTOLIC BLOOD PRESSURE: 63 MMHG | TEMPERATURE: 99 F

## 2025-02-27 DIAGNOSIS — I85.10 SECONDARY ESOPHAGEAL VARICES WITHOUT BLEEDING: ICD-10-CM

## 2025-02-27 DIAGNOSIS — K75.81 NASH (NONALCOHOLIC STEATOHEPATITIS): Primary | ICD-10-CM

## 2025-02-27 DIAGNOSIS — K74.60 HEPATIC CIRRHOSIS, UNSPECIFIED HEPATIC CIRRHOSIS TYPE, UNSPECIFIED WHETHER ASCITES PRESENT: Primary | ICD-10-CM

## 2025-02-27 DIAGNOSIS — K74.69 OTHER CIRRHOSIS OF LIVER: ICD-10-CM

## 2025-02-27 DIAGNOSIS — E66.01 MORBID OBESITY WITH BODY MASS INDEX (BMI) OF 40.0 TO 44.9 IN ADULT: ICD-10-CM

## 2025-02-27 DIAGNOSIS — K21.9 GASTROESOPHAGEAL REFLUX DISEASE WITHOUT ESOPHAGITIS: ICD-10-CM

## 2025-02-27 LAB
AFP SERPL-MCNC: 6.4 NG/ML (ref 0–8.4)
ALBUMIN SERPL BCP-MCNC: 3.1 G/DL (ref 3.5–5.2)
ALP SERPL-CCNC: 118 U/L (ref 40–150)
ALT SERPL W/O P-5'-P-CCNC: 17 U/L (ref 10–44)
AMMONIA PLAS-SCNC: 33 UMOL/L (ref 10–50)
ANION GAP SERPL CALC-SCNC: 8 MMOL/L (ref 8–16)
AST SERPL-CCNC: 31 U/L (ref 10–40)
BASOPHILS # BLD AUTO: 0.04 K/UL (ref 0–0.2)
BASOPHILS NFR BLD: 0.4 % (ref 0–1.9)
BILIRUB DIRECT SERPL-MCNC: 0.2 MG/DL (ref 0.1–0.3)
BILIRUB SERPL-MCNC: 0.3 MG/DL (ref 0.1–1)
BUN SERPL-MCNC: 29 MG/DL (ref 8–23)
CALCIUM SERPL-MCNC: 9.8 MG/DL (ref 8.7–10.5)
CHLORIDE SERPL-SCNC: 103 MMOL/L (ref 95–110)
CO2 SERPL-SCNC: 26 MMOL/L (ref 23–29)
CREAT SERPL-MCNC: 1.3 MG/DL (ref 0.5–1.4)
DIFFERENTIAL METHOD BLD: ABNORMAL
EOSINOPHIL # BLD AUTO: 0.6 K/UL (ref 0–0.5)
EOSINOPHIL NFR BLD: 6 % (ref 0–8)
ERYTHROCYTE [DISTWIDTH] IN BLOOD BY AUTOMATED COUNT: 15.3 % (ref 11.5–14.5)
EST. GFR  (NO RACE VARIABLE): 43.4 ML/MIN/1.73 M^2
GLUCOSE SERPL-MCNC: 192 MG/DL (ref 70–110)
HCT VFR BLD AUTO: 37.6 % (ref 37–48.5)
HGB BLD-MCNC: 11.9 G/DL (ref 12–16)
IMM GRANULOCYTES # BLD AUTO: 0.06 K/UL (ref 0–0.04)
IMM GRANULOCYTES NFR BLD AUTO: 0.6 % (ref 0–0.5)
INR PPP: 1 (ref 0.8–1.2)
LYMPHOCYTES # BLD AUTO: 2.2 K/UL (ref 1–4.8)
LYMPHOCYTES NFR BLD: 21.9 % (ref 18–48)
MCH RBC QN AUTO: 31.3 PG (ref 27–31)
MCHC RBC AUTO-ENTMCNC: 31.6 G/DL (ref 32–36)
MCV RBC AUTO: 99 FL (ref 82–98)
MONOCYTES # BLD AUTO: 1 K/UL (ref 0.3–1)
MONOCYTES NFR BLD: 10.1 % (ref 4–15)
NEUTROPHILS # BLD AUTO: 6 K/UL (ref 1.8–7.7)
NEUTROPHILS NFR BLD: 61 % (ref 38–73)
NRBC BLD-RTO: 0 /100 WBC
PLATELET # BLD AUTO: 293 K/UL (ref 150–450)
PMV BLD AUTO: 9.3 FL (ref 9.2–12.9)
POTASSIUM SERPL-SCNC: 4.7 MMOL/L (ref 3.5–5.1)
PROT SERPL-MCNC: 7.2 G/DL (ref 6–8.4)
PROTHROMBIN TIME: 11 SEC (ref 9–12.5)
RBC # BLD AUTO: 3.8 M/UL (ref 4–5.4)
SODIUM SERPL-SCNC: 137 MMOL/L (ref 136–145)
WBC # BLD AUTO: 9.81 K/UL (ref 3.9–12.7)

## 2025-02-27 PROCEDURE — 1125F AMNT PAIN NOTED PAIN PRSNT: CPT | Mod: HCNC,CPTII,S$GLB, | Performed by: INTERNAL MEDICINE

## 2025-02-27 PROCEDURE — 3077F SYST BP >= 140 MM HG: CPT | Mod: HCNC,CPTII,S$GLB, | Performed by: INTERNAL MEDICINE

## 2025-02-27 PROCEDURE — 99999 PR PBB SHADOW E&M-EST. PATIENT-LVL V: CPT | Mod: PBBFAC,HCNC,, | Performed by: INTERNAL MEDICINE

## 2025-02-27 PROCEDURE — 4010F ACE/ARB THERAPY RXD/TAKEN: CPT | Mod: HCNC,CPTII,S$GLB, | Performed by: INTERNAL MEDICINE

## 2025-02-27 PROCEDURE — 1100F PTFALLS ASSESS-DOCD GE2>/YR: CPT | Mod: HCNC,CPTII,S$GLB, | Performed by: INTERNAL MEDICINE

## 2025-02-27 PROCEDURE — 3072F LOW RISK FOR RETINOPATHY: CPT | Mod: HCNC,CPTII,S$GLB, | Performed by: INTERNAL MEDICINE

## 2025-02-27 PROCEDURE — 85610 PROTHROMBIN TIME: CPT | Mod: HCNC | Performed by: INTERNAL MEDICINE

## 2025-02-27 PROCEDURE — 82248 BILIRUBIN DIRECT: CPT | Mod: HCNC | Performed by: INTERNAL MEDICINE

## 2025-02-27 PROCEDURE — 82140 ASSAY OF AMMONIA: CPT | Mod: HCNC | Performed by: INTERNAL MEDICINE

## 2025-02-27 PROCEDURE — 99215 OFFICE O/P EST HI 40 MIN: CPT | Mod: HCNC,S$GLB,, | Performed by: INTERNAL MEDICINE

## 2025-02-27 PROCEDURE — 36415 COLL VENOUS BLD VENIPUNCTURE: CPT | Mod: HCNC | Performed by: INTERNAL MEDICINE

## 2025-02-27 PROCEDURE — 3288F FALL RISK ASSESSMENT DOCD: CPT | Mod: HCNC,CPTII,S$GLB, | Performed by: INTERNAL MEDICINE

## 2025-02-27 PROCEDURE — 1157F ADVNC CARE PLAN IN RCRD: CPT | Mod: HCNC,CPTII,S$GLB, | Performed by: INTERNAL MEDICINE

## 2025-02-27 PROCEDURE — 85025 COMPLETE CBC W/AUTO DIFF WBC: CPT | Mod: HCNC | Performed by: INTERNAL MEDICINE

## 2025-02-27 PROCEDURE — 80053 COMPREHEN METABOLIC PANEL: CPT | Mod: HCNC | Performed by: INTERNAL MEDICINE

## 2025-02-27 PROCEDURE — 3078F DIAST BP <80 MM HG: CPT | Mod: HCNC,CPTII,S$GLB, | Performed by: INTERNAL MEDICINE

## 2025-02-27 PROCEDURE — 82105 ALPHA-FETOPROTEIN SERUM: CPT | Mod: HCNC | Performed by: INTERNAL MEDICINE

## 2025-02-27 PROCEDURE — 3008F BODY MASS INDEX DOCD: CPT | Mod: HCNC,CPTII,S$GLB, | Performed by: INTERNAL MEDICINE

## 2025-02-27 RX ORDER — OMEPRAZOLE 20 MG/1
40 CAPSULE, DELAYED RELEASE ORAL 2 TIMES DAILY
Qty: 360 CAPSULE | Refills: 3 | Status: SHIPPED | OUTPATIENT
Start: 2025-02-27 | End: 2025-03-12 | Stop reason: SDUPTHER

## 2025-02-27 NOTE — PATIENT INSTRUCTIONS
For gerd and dysphagia- increase prilosec to 40 mg twice daily  Repeat labs today to check on white count  Will check ammonia today and will give lactulose- titrate to have 1-2 extra BMs per day  Continue current diuretics  Abdo US 08/25 w AFP  Labs every 3 months including today  EGD now  Return 4 months

## 2025-02-27 NOTE — PROGRESS NOTES
HEPATOLOGY FOLLOW UP    Referring Physician: Nalini Hall MD  Current Corresponding Physician: Nalini Hall MD, Hilda Odonnell MD    Vivienne Jose is here for follow up of compensated cirrhosis.    HPI  She is a 74 y.o. female with a PMHx of DM, HTN, CAD, hyperlipidemia, recurrent UTIs and obesity who had a renal stone CT and abdominal US (both 11/20) and was found to have a nodular liver c/w cirrhosis but no HCC. The patient does not drink alcohol heavily; there is no family history of chronic liver disease. HCV AB was negative in 2017. I saw her in consultation 11/25/20.    Interval HIstory  Since Vivienne Jose's last few visits:  --fractured T12/L1- s/p kyphoplasty 9/23/24; now on timlos for osteoporosis x 2 years; also had steroid infection  --lost 16 lbs during this time  --has fallen out of bed 3 times- will get a railing to place under her mattress to prevent this  --edema, ongoing- well controlled on lasix 20 mg daily; spironolactone every other day  --given duloxetine for neuropathy- improved once increased from 20 mg to 60 mg daily  --thought to have new a fib; eliquis recommended; ablation also recommended; however repeat holter-no evidence of a fib; eliquis not started; heart stable  --c/o dysphagia    EGD 3/11/23: no varices  Edema, ongoing: continue lasix 20 mg daily and spironolactone 50 mg daily  EGD, repeat: scheduled 2025  Abdo US 2/4/25: Heterogeneous hepatic echotexture with increased echogenicity, which can be seen in setting of chronic liver dysfunction. No focal hepatic lesions.     Serologic w/u for CLD:  HCV Ab-, HBsAg-, HBcAb-, HBsAb-, HAV IgG+  CHRISTINA+ (1:1280), ASMA-,   Ferritin 38, iron sat 15%  Peth negative  Alpha 1 antitrypsin level 123    Labs 2/4/25: WBC 17 (elevated-had low grade fever). ALT 31, AST 34, ALKP 86, Tbil 0.5, albumin 3.3    MELD 3.0: 10 at 2/4/2025  8:07 AM  MELD-Na: 8 at 2/4/2025  8:07 AM  Calculated from:  Serum Creatinine: 1.2 mg/dL at 2/4/2025  8:07  AM  Serum Sodium: 138 mmol/L (Using max of 137 mmol/L) at 2/4/2025  8:07 AM  Total Bilirubin: 0.7 mg/dL (Using min of 1 mg/dL) at 2/4/2025  8:07 AM  Serum Albumin: 3 g/dL at 2/4/2025  8:07 AM  INR(ratio): 1 at 2/4/2025  8:07 AM  Age at listing (hypothetical): 74 years  Sex: Female at 2/4/2025  8:07 AM      Outpatient Encounter Medications as of 2/27/2025   Medication Sig Dispense Refill    abaloparatide (TYMLOS) 80 mcg (3,120 mcg/1.56 mL) PnIj Inject 0.04 mLs (80 mcg total) into the skin once daily. 1.56 mL 11    ascorbic acid, vitamin C, (VITAMIN C) 100 MG tablet Take 500 mg by mouth 2 (two) times daily.       aspirin (ECOTRIN) 81 MG EC tablet Take 81 mg by mouth once daily.      betamethasone dipropionate (DIPROLENE) 0.05 % ointment Apply topically 2 (two) times daily. Use to affected areas for up to 2 weeks then take a 1 week break or decrease to 3 times weekly. Do not apply to groin or face. Use to spot on ear when flaring 15 g 2    blood sugar diagnostic (TRUE METRIX GLUCOSE TEST STRIP) Strp TEST TWO TIMES DAILY 200 strip 6    blood-glucose meter Misc [employer] True Metrix Air meter 1 each 0    DULoxetine (CYMBALTA) 60 MG capsule Take 1 capsule (60 mg total) by mouth once daily. 30 capsule 11    estradioL (ESTRING) 2 mg (7.5 mcg /24 hour) vaginal ring Remove old Estring, place the new one every 3 months. 1 each 0    fluticasone propionate (FLONASE) 50 mcg/actuation nasal spray 2 sprays (100 mcg total) by Each Nostril route once daily. 48 g 2    furosemide (LASIX) 20 MG tablet Take 1 tablet (20 mg total) by mouth once daily. 30 tablet 11    gabapentin (NEURONTIN) 600 MG tablet TAKE 1 TABLET THREE TIMES DAILY 180 tablet 5    glipiZIDE (GLUCOTROL) 5 MG tablet TAKE 1 TABLET TWICE DAILY BEFORE MEALS 180 tablet 1    ipratropium (ATROVENT) 42 mcg (0.06 %) nasal spray 2 sprays by Each Nostril route 4 (four) times daily. 45 mL 4    ketotifen (ZADITOR) 0.025 % (0.035 %) ophthalmic solution Place 1-2 drops into both eyes  "once daily.      L.acid-B.bifidum-B.animal-FOS 25 billion cell -100 mg Cap Take 1 capsule by mouth once daily.      lancets (TRUEPLUS LANCETS) 28 gauge Misc Inject 1 lancet into the skin 2 (two) times daily before meals. 200 each 6    levothyroxine (SYNTHROID) 75 MCG tablet Take 1 tablet (75 mcg total) by mouth once daily. 90 tablet 3    loratadine (CLARITIN) 10 mg tablet Take 10 mg by mouth once daily.      lovastatin (MEVACOR) 40 MG tablet TAKE 1 TABLET NIGHTLY (SUBSTITUTED FOR MEVACOR) 90 tablet 3    metFORMIN (GLUCOPHAGE) 1000 MG tablet Take 1 tablet (1,000 mg total) by mouth 2 (two) times daily with meals. 180 tablet 1    mv-mn/folic acid/vit [id number] (ALIVE ONCE DAILY WOMEN 50 PLUS ORAL) Take 1 tablet by mouth once daily.      omeprazole (PRILOSEC) 20 MG capsule TAKE 1 CAPSULE BY MOUTH DAILY AS NEEDED (ACID REFLUX). 90 capsule 3    pen needle, diabetic (BD ULTRA-FINE EDUARDO PEN NEEDLE) 32 gauge x 5/32" Ndle Inject 1 Needle into the skin once daily. 100 each 6    semaglutide (OZEMPIC) 1 mg/dose (4 mg/3 mL) Inject 1 mg into the skin every 7 days. 3 mL 11    spironolactone (ALDACTONE) 50 MG tablet Take 1 tablet (50 mg total) by mouth every other day. 15 tablet 11    tiZANidine (ZANAFLEX) 4 MG tablet Take 1 tablet (4 mg total) by mouth every 8 (eight) hours as needed (muscle tension). 60 tablet 1    TRESIBA FLEXTOUCH U-100 100 unit/mL (3 mL) insulin pen Inject 32 Units into the skin once daily. 30 mL 3    UNABLE TO FIND Take 1 tablet by mouth 2 (two) times a day. medication name: Magnesium 400mg, Calcium 1000 mg, D3 15mcg, Zinc 15mg      valACYclovir (VALTREX) 500 MG tablet Take 1 tablet (500 mg total) by mouth once daily. 90 tablet 3    valsartan (DIOVAN) 160 MG tablet Take 1 tablet (160 mg total) by mouth once daily. 90 tablet 1    azelastine (ASTELIN) 137 mcg (0.1 %) nasal spray 2 sprays (274 mcg total) by Nasal route 2 (two) times daily. 120 mL 4    [DISCONTINUED] gabapentin (NEURONTIN) 600 MG tablet Take 1 " tablet (600 mg total) by mouth 2 (two) times daily.      [DISCONTINUED] valsartan (DIOVAN) 160 MG tablet Take 1 tablet (160 mg total) by mouth once daily.       Facility-Administered Encounter Medications as of 2/27/2025   Medication Dose Route Frequency Provider Last Rate Last Admin    capsaicin-skin cleanser patch 4 patch  4 patch Topical (Top) 1 time in Clinic/HOD          Review of patient's allergies indicates:   Allergen Reactions    Sulfa (sulfonamide antibiotics) Nausea Only    Ciprofloxacin     Januvia [sitagliptin]      abd pain    Lisinopril     Lotensin [benazepril]     Nexium [esomeprazole magnesium] Other (See Comments)     Gas     Past Medical History:   Diagnosis Date    Allergy     Angio-edema     Arthritis     Cataract     bilateral - not removed    Cerebrovascular malformation     Cirrhosis 11/24/2020    Colon polyps     Diabetes mellitus, type 2     Diabetic peripheral neuropathy     Edema of both feet 8/19/2022    GERD (gastroesophageal reflux disease)     Herpes infection     Hypothyroidism     Kidney stones     Mild nonproliferative diabetic retinopathy of 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 edema associated with type 2 diabetes mellitus 4/18/2019    DEL RIO (nonalcoholic steatohepatitis) 8/19/2022    Nausea & vomiting 11/23/2015    Obesity, morbid     Ovarian cyst     Seizure disorder, focal motor     Sleep apnea     Type II or unspecified type diabetes mellitus with neurological manifestations, uncontrolled(250.62)     Urticaria        Review of Systems   Constitutional: Negative.    HENT: Negative.     Eyes: Negative.    Respiratory: Negative.     Cardiovascular: Negative.    Gastrointestinal: Negative.    Genitourinary: Negative.    Musculoskeletal: Negative.    Skin: Negative.    Neurological: Negative.    Psychiatric/Behavioral: Negative.       Vitals:    02/27/25 0833   BP: (!) 140/63   Pulse: 78   Resp: 18   Temp: 98.6 °F (37 °C)       Physical Exam  Vitals reviewed.   Constitutional:        Appearance: Normal appearance.   Eyes:      General: Scleral icterus present.   Cardiovascular:      Rate and Rhythm: Normal rate and regular rhythm.      Pulses: Normal pulses.   Pulmonary:      Effort: Pulmonary effort is normal.   Abdominal:      General: Abdomen is flat. Bowel sounds are normal.      Palpations: Abdomen is soft.   Musculoskeletal:         General: No swelling.   Skin:     General: Skin is warm and dry.   Neurological:      General: No focal deficit present.      Mental Status: She is alert.   Psychiatric:         Mood and Affect: Mood normal.          MELD 3.0: 10 at 2/4/2025  8:07 AM  MELD-Na: 8 at 2/4/2025  8:07 AM  Calculated from:  Serum Creatinine: 1.2 mg/dL at 2/4/2025  8:07 AM  Serum Sodium: 138 mmol/L (Using max of 137 mmol/L) at 2/4/2025  8:07 AM  Total Bilirubin: 0.7 mg/dL (Using min of 1 mg/dL) at 2/4/2025  8:07 AM  Serum Albumin: 3 g/dL at 2/4/2025  8:07 AM  INR(ratio): 1 at 2/4/2025  8:07 AM  Age at listing (hypothetical): 74 years  Sex: Female at 2/4/2025  8:07 AM      Lab Results   Component Value Date    GLU 71 02/04/2025    GLU 71 02/04/2025    BUN 28 (H) 02/04/2025    BUN 28 (H) 02/04/2025    CREATININE 1.2 02/04/2025    CREATININE 1.2 02/04/2025    CALCIUM 10.2 02/04/2025    CALCIUM 10.2 02/04/2025     02/04/2025     02/04/2025    K 4.9 02/04/2025    K 4.9 02/04/2025     02/04/2025     02/04/2025    PROT 7.6 02/04/2025    PROT 7.6 02/04/2025    CO2 24 02/04/2025    CO2 24 02/04/2025    ANIONGAP 10 02/04/2025    ANIONGAP 10 02/04/2025    WBC 17.02 (H) 02/04/2025    RBC 4.07 02/04/2025    HGB 12.8 02/04/2025    HCT 41.2 02/04/2025     (H) 02/04/2025    MCH 31.4 (H) 02/04/2025    MCHC 31.1 (L) 02/04/2025     Lab Results   Component Value Date    RDW 15.6 (H) 02/04/2025     02/04/2025    MPV 10.0 02/04/2025    GRAN 76.0 (H) 02/04/2025    GRAN 5.2 10/11/2024    LYMPH 12.0 (L) 02/04/2025    LYMPH 1.9 10/11/2024    MONO 6.0 02/04/2025    MONO 1.0  10/11/2024    EOSINOPHIL 3.0 02/04/2025    BASOPHIL 1.0 02/04/2025    EOS 0.3 10/11/2024    BASO 0.04 10/11/2024    APTT 25.6 07/27/2011    GROUPTRH O POS 11/29/2007    CHOL 130 03/08/2024    TRIG 102 03/08/2024    HDL 48 03/08/2024    CHOLHDL 36.9 03/08/2024    TOTALCHOLEST 2.7 03/08/2024    ALBUMIN 3.0 (L) 02/04/2025    ALBUMIN 3.0 (L) 02/04/2025    BILIDIR 0.3 02/04/2025    AST 25 02/04/2025    AST 25 02/04/2025    ALT 24 02/04/2025    ALT 24 02/04/2025    ALKPHOS 122 02/04/2025    ALKPHOS 122 02/04/2025    MG 1.9 02/09/2023    LABPROT 11.0 02/04/2025    INR 1.0 02/04/2025       Assessment and Plan:  Vivienne Jose is a 73 y.o. female with decompensated cirrhosis. Current recommendations:  1. Cirrhosis, decompensated: labs every 12 weeks including today; HCC screening in 6 months (08/25);   2. Recurrent UTIs: monitor  3. Edema: continue lasix 20 mg daily and aldactone 50 mg every other day;   4. Af fib: not diagnosed with afib; no need for eliquis  5. Portal HTN: repeat EGD 3/25  6. Hypotension on diuretics and antihypertensive- normal BP off these meds- monitor  7. GERD and dysphagia: recommend increase prilosec to 40 mg bid  8. Leukocytosis: repeat labs today to f/u  9. HE, presumed: check ammonia and start trial of lactulose  10. Obesity: couseled to lose weight    Return 4 months    A total of 60 minutes was spent reviewing the patient's chart, examining the patient, reviewing labs and imaging and coordinating care with the patient's care team.

## 2025-03-03 ENCOUNTER — RESULTS FOLLOW-UP (OUTPATIENT)
Dept: TRANSPLANT | Facility: CLINIC | Age: 75
End: 2025-03-03

## 2025-03-05 ENCOUNTER — OFFICE VISIT (OUTPATIENT)
Dept: PAIN MEDICINE | Facility: CLINIC | Age: 75
End: 2025-03-05
Payer: MEDICARE

## 2025-03-05 VITALS
WEIGHT: 232.81 LBS | DIASTOLIC BLOOD PRESSURE: 67 MMHG | HEIGHT: 62 IN | HEART RATE: 84 BPM | SYSTOLIC BLOOD PRESSURE: 139 MMHG | BODY MASS INDEX: 42.84 KG/M2

## 2025-03-05 DIAGNOSIS — G89.4 CHRONIC PAIN SYNDROME: ICD-10-CM

## 2025-03-05 DIAGNOSIS — M51.360 DEGENERATION OF INTERVERTEBRAL DISC OF LUMBAR REGION WITH DISCOGENIC BACK PAIN: ICD-10-CM

## 2025-03-05 DIAGNOSIS — M80.88XA OSTEOPOROTIC COMPRESSION FRACTURE OF VERTEBRA, INITIAL ENCOUNTER: ICD-10-CM

## 2025-03-05 DIAGNOSIS — M54.14 THORACIC RADICULOPATHY: Primary | ICD-10-CM

## 2025-03-05 DIAGNOSIS — M47.816 LUMBAR SPONDYLOSIS: ICD-10-CM

## 2025-03-05 PROCEDURE — 99999 PR PBB SHADOW E&M-EST. PATIENT-LVL IV: CPT | Mod: PBBFAC,HCNC,, | Performed by: STUDENT IN AN ORGANIZED HEALTH CARE EDUCATION/TRAINING PROGRAM

## 2025-03-05 RX ORDER — TRAMADOL HYDROCHLORIDE 50 MG/1
50 TABLET ORAL 3 TIMES DAILY PRN
Qty: 20 EACH | Refills: 0 | Status: SHIPPED | OUTPATIENT
Start: 2025-03-05 | End: 2025-03-12

## 2025-03-05 RX ORDER — TIZANIDINE 4 MG/1
4-8 TABLET ORAL NIGHTLY PRN
Qty: 180 TABLET | Refills: 0 | Status: SHIPPED | OUTPATIENT
Start: 2025-03-05 | End: 2025-06-03

## 2025-03-05 NOTE — PROGRESS NOTES
Chronic Pain - f/u    Referring Physician: No ref. provider found    Date: 03/05/2025     Re: Vivienne Jose  MR#: [id number]  YOB: 1950  Age: 74 y.o.    Chief Complaint:   Chief Complaint   Patient presents with    Follow-up     **This note is dictated using the M*Modal Fluency Direct word recognition program. There are word recognition mistakes that are occasionally missed on review.**    ASSESSMENT: 74 y.o. year old female with lower back pain, consistent with     1. Thoracic radiculopathy  tiZANidine (ZANAFLEX) 4 MG tablet    traMADoL (ULTRAM) 50 mg tablet      2. Chronic pain syndrome        3. Degeneration of intervertebral disc of lumbar region with discogenic back pain        4. Lumbar spondylosis        5. Osteoporotic compression fracture of vertebra, initial encounter          PLAN:     Acute T12 and L1 osteoporotic compression fracture - improved  -The patient started getting severe back pain radiating to the groin after she bent to  her mom in early August.  At that time she was found to have new compression fractures at T12 and L1.  I suspect that the inflammation from the compression fracture is causing nerve root irritation which is causing the radicular component that we are seeing in the T12 and L1 distribution.  9/23/24 - T12 and L1 Spinejack - gen sed - ASA81 - stop Rybelsus - needs post-op meds - f/u on 9/27 - cleared to stop aspirin - excellent height restoration of vertebral body. No extravasation. - moderate improvement at 1.5m then a bit worse again at 3m.  -MRI results reviewed. No new fractures    Lumbar radiculopathy from T12 or L1 nerve root irritation  -suspect that this is causing the pain wrapping around to abdomen. This has resolved since the injection  1/28/25 - b/l T12-L1 TFESI - RN sed - stop ASA - stop ozempic - really good improvement but then got worse again after she fell out of the bed  -tramadol #20 for severe pain only  -Tizanidine 4-8mg qhs for 2 weeks  "then PRN    Osteoporosis   -continue with Papi Olivera for osteoporosis treatment  -On Tymlos    Lumbar spondylosis  -severe facet arthropathy at bilateral L4-5 and L5-S1  -I suspect this was responsible for her chronic axial low back pain prior to her accident.  -consider MBB/RFA for low back pain    - RTC 3 months or earlier if needed  - Counseled patient regarding the importance of weight loss and activity modification and physical therapy.    The above plan and management options were discussed at length with patient. Patient is in agreement with the above and verbalized understanding. It will be communicated with the referring physician via electronic record, fax, or mail.  Lab/study reports reviewed were important and necessary because subsequent medical and treatment recommendations required review of the above lab/study reports. Images viewed/reviewed above were important and necessary because subsequent medical and treatment recommendations required review of the reviewed image(s).     Electronically signed by:  Myron Ocasio DO  03/05/2025    =========================================================================================================    SUBJECTIVE:    Interval History 3/5/2025:   Vivienne Jose is a 74 y.o. female presents to the clinic for follow up.  Since last visit the pain has has worsened slightly. Patient states that she was doing okay and then she fell out of bed last week (for the third time).  She has some bruises in her arm and her cheek.  The injection on 1/28/25 did help a lot until the fall.  She still gets a strong muscle spasm in the back when she goes to bed.     The pain is located in the mid lower back area and radiates to the lower back .  The pain is described as aching and "pressure"     At BEST  0/10   At WORST  10/10 on the WORST day.    On average pain is rated as 5/10.   Today the pain is rated as 0/10  Symptoms interfere with daily activity.   Exacerbating " factors: Sitting, Standing, Laying, and Walking.    Mitigating factors medications.     Current pain medications: duloxetine 20mg, Tymlos for osteoporosis.  Failed Pain Medications: prednisone, cannot take NSAIDs  Stopped because pain better: Hydrocodone (rare), tizanidine, gabapentin,     Pain procedures:  9/23/24 - T12 and L1 Spinejack - gen sed - ASA81 - stop Rybelsus - needs post-op meds - f/u on 9/27 - cleared to stop aspirin - some impropvement @POD#4    Interval History 12/20/2024:   Vivienne Jose is a 74 y.o. female presents to the clinic for follow up.  Since last visit the pain has has worsened.  The pain is located in the lower back area and radiates to the bilateral sides .  The pain is described as aching and pressure    The patient states that her back pain is located in the low/mid low back.  She states that it was getting better to where she didn't feel it but then all of a sudden it started getting worse again.  She denies any inciting event when it started getting worse again.  The pain is worse with laying down.  Leaning to the side starts hurting.  The pain is not the same level as prior from the compression fracture.      At BEST  5/10   At WORST  5/10 on the WORST day.    On average pain is rated as 5/10.   Today the pain is rated as 5/10  Symptoms interfere with daily activity.   Exacerbating factors: Sitting, Standing, Laying, and Walking.    Mitigating factors nothing.     Interval History 9/27/2024:   Vivienne Jose is a 74 y.o. female presents to the clinic for follow up.  Since last visit the pain has has slightly improved.  The patient is s/p T12/L1 spinejack on 9/23 and the abdominal and back pain is improving. The muscle spasms have eased up a little.    The pain is located in the lower back area and radiates to the right side .  The pain is described as aching    At BEST  5/10   At WORST  8/10 on the WORST day.    On average pain is rated as 5/10.   Today the pain is rated as  "3/10  Symptoms interfere with daily activity.   Exacerbating factors: Sitting, Standing, Laying, and Walking.    Mitigating factors medications.     Initial hx:  Vivienne Jose is a 74 y.o. female presents to the clinic for the evaluation of lower mid back  pain. The pain started years ago following no inciting event and symptoms have been worsening.  The patient states that she was trying to  her mother in the beginning of August and she felt a "snap" and then went to the ED and was found to have acute compression fracture at T12 and L1.  The back pain has gotten much worse since it started.    Her pain is actually located predominantly in the low back.  She does not have too much pain in the upper low back.  The pain seems to start in the low back and will wrap around to the abdomen.  This has gotten worse since the compression fracture.      Pain Description:    The pain is located in the lower back area and radiates to the stomach .    At BEST  2/10   At WORST  10/10 on the WORST day.    On average pain is rated as 5/10.   Today the pain is rated as 5/10  The pain is continuous.  The pain is described as aching and shooting.    Symptoms interfere with daily activity and sleeping.   Exacerbating factors: Sitting, Standing, Laying, Bending, Lifting, and Getting out of bed/chair.    Mitigating factors medications.   She reports six hours of sleep per night.    Physical Therapy/Home Exercise: No, not currently in physical therapy or home exercise program    Current Pain Medications:    - calcitonin, tizanidine, gabapentin, duloxetine 20mg    Failed Pain Medications:    - prednisone, cannot take NSAIDs    Pain Treatment Therapies:    Pain procedures: none  Physical Therapy: yes  Chiropractor: none  Acupuncture: none  TENS unit: none  Spinal decompression: none  Joint replacement: R TKA    Patient denies urinary incontinence and bowel incontinence and weakness  Patient denies any suicidal or homicidal " ideations     report:  Reviewed and consistent with medication use as prescribed.    Imaging:   MRI Lumbar 09/2024:  FINDINGS:  Alignment: Grade 1 anterolisthesis of L4 on L5.     Vertebrae: Compression deformity of the T12 vertebral body with approximately 30% height loss and 5 mm of associated retropulsion.  Additional mild compression deformity of the L1 vertebral body with minimal retropulsion eccentric to the right.  Extensive marrow edema throughout the T12 vertebral body and to a lesser extent L1 vertebral body.  Mild edema of the prevertebral soft tissues.  Trace fluid in the T11-T12 disc space, likely post-traumatic.  L1 vertebral body hemangioma.     Discs: Mild disc height loss of T11-T12, T12-L1 and L4-L5. no evidence for discitis.     Cord: No cord signal abnormality or evidence of arachnoiditis.  Conus terminates at L1-L2.     Degenerative findings:     T11-T12: Fracture level with 5 mm of retropulsion of the superior endplate of T12 with mild spinal canal stenosis.     T12-L1: Fracture level with minimal retropulsion eccentric to the right resulting in mild spinal canal stenosis.     L1-L2: Circumferential disc bulge and mild facet arthropathy.  No spinal canal stenosis or neural foraminal narrowing.     L2-L3: Circumferential disc bulge with right paracentral protrusion and mild facet arthropathy.  No spinal canal stenosis or neural foraminal narrowing.     L3-L4: Mild facet arthropathy.  No spinal canal stenosis or neural foraminal narrowing.     L4-L5: Uncovering of the intervertebral disc with circumferential bulge and severe facet arthropathy result in mild spinal canal stenosis with moderate effacement of the right lateral recess as well as moderate right, mild left neural foraminal narrowing.  Trace bilateral facet effusions noted.     L5-S1: Moderate facet arthropathy.  No spinal canal stenosis or neural foraminal narrowing.     Paraspinal muscles & soft tissues: Bilateral simple renal  cysts.  Moderate atrophy of the paraspinal muscles.        3/5/2025     2:40 PM 12/20/2024     2:40 PM 9/5/2024    11:18 AM 6/9/2015     1:57 PM   Pain Disability Index (PDI)   Family/Home Responsibilities: 5 5 5 6   Recreation: 5 5 5 4   Occupation: 5 5 5 3   Sexual Behavior: 5 5 5 0   Self Care: 5 5 5 0   Life-Support Activities: 5 5 5 0   Pain Disability Index (PDI) 35 35 35 14        Past Medical History:   Diagnosis Date    Allergy     Angio-edema     Arthritis     Cataract     bilateral - not removed    Cerebrovascular malformation     Cirrhosis 11/24/2020    Colon polyps     Diabetes mellitus, type 2     Diabetic peripheral neuropathy     Edema of both feet 8/19/2022    GERD (gastroesophageal reflux disease)     Herpes infection     Hypothyroidism     Kidney stones     Mild nonproliferative diabetic retinopathy of 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 edema associated with type 2 diabetes mellitus 4/18/2019    DEL RIO (nonalcoholic steatohepatitis) 8/19/2022    Nausea & vomiting 11/23/2015    Obesity, morbid     Ovarian cyst     Seizure disorder, focal motor     Sleep apnea     Type II or unspecified type diabetes mellitus with neurological manifestations, uncontrolled(250.62)     Urticaria      Past Surgical History:   Procedure Laterality Date    CARPAL TUNNEL RELEASE Right 2014    CATARACT EXTRACTION W/  INTRAOCULAR LENS IMPLANT Right 9/19/2023    Procedure: EXTRACTION, CATARACT, WITH IOL INSERTION;  Surgeon: Lyndon Ortiz MD;  Location: [facility] OR;  Service: Ophthalmology;  Laterality: Right;    CATARACT EXTRACTION W/  INTRAOCULAR LENS IMPLANT Left 10/3/2023    Procedure: EXTRACTION, CATARACT, WITH IOL INSERTION;  Surgeon: Lyndon Ortiz MD;  Location: [facility] OR;  Service: Ophthalmology;  Laterality: Left;    COLONOSCOPY      COLONOSCOPY N/A 9/13/2017    Procedure: COLONOSCOPY Golytely;  Surgeon: Gisela Wall MD;  Location: [facility];  Service: Endoscopy;  Laterality: N/A;    COLONOSCOPY N/A  9/9/2022    Procedure: COLONOSCOPY Extended Suprep;  Surgeon: Britt Jasso MD;  Location: [facility] ENDO;  Service: Endoscopy;  Laterality: N/A;    CYST REMOVAL      skin; multiples    ESOPHAGOGASTRODUODENOSCOPY Left 10/15/2021    Procedure: EGD (ESOPHAGOGASTRODUODENOSCOPY);  Surgeon: Luis Fernando Taveras MD;  Location: [facility] (4TH FLR);  Service: Endoscopy;  Laterality: Left;  cirrhosis labwork am of procedure  last seizure 1973  COVID test on 10/12/21 at [facility]    ESOPHAGOGASTRODUODENOSCOPY N/A 3/10/2023    Procedure: EGD (ESOPHAGOGASTRODUODENOSCOPY);  Surgeon: Christa Caldera MD;  Location: [facility] (2ND FLR);  Service: Endoscopy;  Laterality: N/A;  Medically Urgent  cirrohsis-labs done on 2/9/23 (< 90 days)  New onset A-fib  3/1 instructions to portal-st  Precall complete- [place]    EXTRACORPOREAL SHOCK WAVE LITHOTRIPSY  2002    HYSTERECTOMY  1984    INJECTION, EPIDURAL, SPINE, CERVICOTHORACIC, TRANSFORAMINAL APPROACH Bilateral 1/28/2025    Procedure: b/l L1-l2 TFESI;  Surgeon: Myron Ocasio DO;  Location: [facility];  Service: Pain Management;  Laterality: Bilateral;  ASA 5 days Ozempic 7 days    JOINT REPLACEMENT Right 03/06/2019    knee    KIDNEY STONE SURGERY      KNEE ARTHROPLASTY Right 3/6/2019    Procedure: ARTHROPLASTY, KNEE;  Surgeon: Pj Gamboa MD;  Location: [facility] OR;  Service: Orthopedics;  Laterality: Right;  Depuy (Stephen notified 2/21, CC)    KYPHOPLASTY, SPINE, LUMBAR Bilateral 9/23/2024    Procedure: T12 and L1 SpineJack ([place]);  Surgeon: Myron Ocasio DO;  Location: [facility] OR;  Service: Pain Management;  Laterality: Bilateral;    THYROIDECTOMY  1977         TONSILLECTOMY, ADENOIDECTOMY      TRIGGER FINGER RELEASE      TUBAL LIGATION       Social History     Socioeconomic History    Marital status:    Occupational History    Occupation: retired     Employer: [employer]   Tobacco Use    Smoking status: Never     Passive exposure: Never     Smokeless tobacco: Never   Substance and Sexual Activity    Alcohol use: No     Alcohol/week: 0.0 standard drinks of alcohol    Drug use: No    Sexual activity: Yes     Partners: Male     Social Drivers of Health     Financial Resource Strain: Low Risk  (1/17/2025)    Overall Financial Resource Strain (CARDIA)     Difficulty of Paying Living Expenses: Not hard at all   Food Insecurity: No Food Insecurity (1/17/2025)    Hunger Vital Sign     Worried About Running Out of Food in the Last Year: Never true     Ran Out of Food in the Last Year: Never true   Transportation Needs: No Transportation Needs (8/9/2024)    PRAPARE - Transportation     Lack of Transportation (Medical): No     Lack of Transportation (Non-Medical): No   Physical Activity: Inactive (1/17/2025)    Exercise Vital Sign     Days of Exercise per Week: 0 days     Minutes of Exercise per Session: 0 min   Stress: No Stress Concern Present (1/17/2025)    [employer] - Occupational Stress Questionnaire     Feeling of Stress : Only a little   Housing Stability: Unknown (1/17/2025)    Housing Stability Vital Sign     Unable to Pay for Housing in the Last Year: No     Homeless in the Last Year: No     Family History   Problem Relation Name Age of Onset    Skin cancer Mother      Macular degeneration Mother      Dementia Mother      Hypertension Mother      Cancer Father      Arthritis Father      Gout Father      No Known Problems Daughter Madisyn     Diabetes Daughter Rudy     Hypertension Daughter Rudy     Arthritis Daughter Arianna     Allergies Son Lyndon     Allergic rhinitis Son Lyndon     Glaucoma Maternal Aunt          Great Maternal Aunt    Leukemia Maternal Uncle      Amblyopia Neg Hx      Cataracts Neg Hx      Retinal detachment Neg Hx      Strabismus Neg Hx      Stroke Neg Hx      Thyroid disease Neg Hx      Kidney disease Neg Hx      Angioedema Neg Hx      Asthma Neg Hx      Atopy Neg Hx      Eczema Neg Hx       Immunodeficiency Neg Hx      Rhinitis Neg Hx      Urticaria Neg Hx         Review of patient's allergies indicates:   Allergen Reactions    Sulfa (sulfonamide antibiotics) Nausea Only    Ciprofloxacin     Januvia [sitagliptin]      abd pain    Lisinopril     Lotensin [benazepril]     Nexium [esomeprazole magnesium] Other (See Comments)     Gas       Current Outpatient Medications   Medication Sig    abaloparatide (TYMLOS) 80 mcg (3,120 mcg/1.56 mL) PnIj Inject 0.04 mLs (80 mcg total) into the skin once daily.    ascorbic acid, vitamin C, (VITAMIN C) 100 MG tablet Take 500 mg by mouth 2 (two) times daily.     aspirin (ECOTRIN) 81 MG EC tablet Take 81 mg by mouth once daily.    azelastine (ASTELIN) 137 mcg (0.1 %) nasal spray 2 sprays (274 mcg total) by Nasal route 2 (two) times daily.    betamethasone dipropionate (DIPROLENE) 0.05 % ointment Apply topically 2 (two) times daily. Use to affected areas for up to 2 weeks then take a 1 week break or decrease to 3 times weekly. Do not apply to groin or face. Use to spot on ear when flaring    blood sugar diagnostic (TRUE METRIX GLUCOSE TEST STRIP) Strp TEST TWO TIMES DAILY    blood-glucose meter Misc [employer] True Metrix Air meter    DULoxetine (CYMBALTA) 60 MG capsule Take 1 capsule (60 mg total) by mouth once daily.    estradioL (ESTRING) 2 mg (7.5 mcg /24 hour) vaginal ring Remove old Estring, place the new one every 3 months.    fluticasone propionate (FLONASE) 50 mcg/actuation nasal spray 2 sprays (100 mcg total) by Each Nostril route once daily.    furosemide (LASIX) 20 MG tablet Take 1 tablet (20 mg total) by mouth once daily.    gabapentin (NEURONTIN) 600 MG tablet TAKE 1 TABLET THREE TIMES DAILY    glipiZIDE (GLUCOTROL) 5 MG tablet TAKE 1 TABLET TWICE DAILY BEFORE MEALS    ipratropium (ATROVENT) 42 mcg (0.06 %) nasal spray 2 sprays by Each Nostril route 4 (four) times daily.    ketotifen (ZADITOR) 0.025 % (0.035 %) ophthalmic solution Place 1-2 drops into both eyes  "once daily.    L.acid-B.bifidum-B.animal-FOS 25 billion cell -100 mg Cap Take 1 capsule by mouth once daily.    lactulose (CHRONULAC) 20 gram/30 mL Soln Take 30 mLs (20 g total) by mouth 2 (two) times daily as needed (titrate to have 2-3 bowl movements per day).    lancets (TRUEPLUS LANCETS) 28 gauge Misc Inject 1 lancet into the skin 2 (two) times daily before meals.    levothyroxine (SYNTHROID) 75 MCG tablet Take 1 tablet (75 mcg total) by mouth once daily.    loratadine (CLARITIN) 10 mg tablet Take 10 mg by mouth once daily.    lovastatin (MEVACOR) 40 MG tablet TAKE 1 TABLET NIGHTLY (SUBSTITUTED FOR MEVACOR)    metFORMIN (GLUCOPHAGE) 1000 MG tablet Take 1 tablet (1,000 mg total) by mouth 2 (two) times daily with meals.    mv-mn/folic acid/vit [id number] (ALIVE ONCE DAILY WOMEN 50 PLUS ORAL) Take 1 tablet by mouth once daily.    omeprazole (PRILOSEC) 20 MG capsule Take 2 capsules (40 mg total) by mouth 2 (two) times a day.    pen needle, diabetic (BD ULTRA-FINE EDUARDO PEN NEEDLE) 32 gauge x 5/32" Ndle Inject 1 Needle into the skin once daily.    semaglutide (OZEMPIC) 1 mg/dose (4 mg/3 mL) Inject 1 mg into the skin every 7 days.    spironolactone (ALDACTONE) 50 MG tablet Take 1 tablet (50 mg total) by mouth every other day.    tiZANidine (ZANAFLEX) 4 MG tablet Take 1-2 tablets (4-8 mg total) by mouth nightly as needed (muscle cramps / back pain).    traMADoL (ULTRAM) 50 mg tablet Take 1 tablet (50 mg total) by mouth 3 (three) times daily as needed for Pain.    TRESIBA FLEXTOUCH U-100 100 unit/mL (3 mL) insulin pen Inject 32 Units into the skin once daily.    UNABLE TO FIND Take 1 tablet by mouth 2 (two) times a day. medication name: Magnesium 400mg, Calcium 1000 mg, D3 15mcg, Zinc 15mg    valACYclovir (VALTREX) 500 MG tablet Take 1 tablet (500 mg total) by mouth once daily.    valsartan (DIOVAN) 160 MG tablet Take 1 tablet (160 mg total) by mouth once daily.     Current Facility-Administered Medications   Medication " "   capsaicin-skin cleanser patch 4 patch       REVIEW OF SYSTEMS:    GENERAL:  No weight loss, malaise or fevers.  HEENT:   No recent changes in vision or hearing  NECK:  Negative for lumps, no difficulty with swallowing.  RESPIRATORY:  Negative for cough, wheezing or shortness of breath, patient denies any recent URI.  CARDIOVASCULAR:  Negative for chest pain, leg swelling or palpitations.  GI:  Negative for abdominal discomfort, blood in stools or black stools or change in bowel habits.  MUSCULOSKELETAL:  See HPI.  SKIN:  Negative for lesions, rash, and itching.  PSYCH:  No mood disorder or recent psychosocial stressors.  Patients sleep is not disturbed secondary to pain.  HEMATOLOGY/LYMPHOLOGY:  Negative for prolonged bleeding, bruising easily or swollen nodes.  Patient is not currently taking any anti-coagulants  NEURO:   No history of headaches, syncope, paralysis, seizures or tremors.  All other reviewed and negative other than HPI.    OBJECTIVE:    /67 (BP Location: Left arm, Patient Position: Sitting)   Pulse 84   Ht 5' 2" (1.575 m)   Wt 105.6 kg (232 lb 12.9 oz)   LMP  (LMP Unknown)   BMI 42.58 kg/m²     PHYSICAL EXAMINATION:    GENERAL: Well appearing, in no acute distress, alert and oriented x3.  PSYCH:  Mood and affect appropriate.  SKIN: Skin color, texture, turgor normal, no rashes or lesions.  HEAD/FACE:  Normocephalic, atraumatic. Cranial nerves grossly intact.  CV: RRR with palpation of the radial artery.  PULM: CTAB. No evidence of respiratory difficulty, symmetric chest rise.  GI:  Soft and non-tender. Obese    BACK: limited by mobility  - No obvious deformity or signs of trauma, Normal lumbar lordotic curve  - Negative spinous process tenderness  - Negative paravertebral tenderness  - Negative pain to palpation over the facet joints of the lumbar spine.   - Negative QL / Iliac crest / Glut tenderness  - Slump test is Negative for radicular pain  - Slump test is Positive for back " pain  - Supine Straight leg raising is Did not perform for radicular pain  - Supine Straight leg raising is Did not perform for back pain  - Lumbar ROM is diminished in Flexion without pain  - Lumbar ROM is diminished in Extension with pain  - Lumbar ROM is diminished in Lateral Flexion with pain    - Did not perform Sustained Hip Flexion test (for discogenic pain)  - Positive Altered Gait, Posture  - Axial facet loading test Did not perform on the bilateral side(s)    SI Joint exam:  - Negative SI joint tenderness to palpation  - Seven's sign Did not perform  - Yeoman's Test: Did not perform for SI joint pain indicating anterior SI ligament involvement. Did not perform for anterior thigh pain/paresthesia which indicates femoral nerve stretch.  - Gaenslen's Test:Unable to Perform  - Finger Josephine's Sign:Negative  - SI compression test:Did not perform  - SI distraction test:Did not perform  - Thigh Thrust: Did not perform  - SI Thrust: Did not perform    MUSKULOSKELETAL:    EXTREMITIES:   Hip Exam:  - Log Roll Did not perform  - FADIR Did not perform  - Stinchfield Did not perform  - Hip Scour Did not perform  - GTB Tenderness Did not perform    NEUROLOGICAL EXAM:  MENTAL STATUS: A x O x 3, good concentration, speech is fluent and goal directed  MEMORY: recent and remote are intact  CN: CN2-12 grossly intact  MOTOR: 5/5 in all muscle groups of LE  DTRs: 0+ intact symmetric

## 2025-03-06 ENCOUNTER — TELEPHONE (OUTPATIENT)
Dept: ALLERGY | Facility: CLINIC | Age: 75
End: 2025-03-06
Payer: MEDICARE

## 2025-03-11 ENCOUNTER — TELEPHONE (OUTPATIENT)
Dept: HEPATOLOGY | Facility: CLINIC | Age: 75
End: 2025-03-11
Payer: MEDICARE

## 2025-03-11 NOTE — TELEPHONE ENCOUNTER
----- Message from Ashley sent at 3/11/2025 10:51 AM CDT -----  Regarding: Prior auth needed on Omeprazole 20 mg  Contact: [facility]   Pharmacy calling to check on PA needed for Omeprazole. States they sent req on 3/4 - [facility] [place] [place] - [address] [facility] [id number]: [phone] Fax: [phone]

## 2025-03-11 NOTE — TELEPHONE ENCOUNTER
Called Niru back and let them know that we receive the Prior Authorization for Omeprazole and that Dr Christianson will review tomorrow when she comes back to the office 03/12/25.

## 2025-03-12 ENCOUNTER — CLINICAL SUPPORT (OUTPATIENT)
Dept: ALLERGY | Facility: CLINIC | Age: 75
End: 2025-03-12
Payer: MEDICARE

## 2025-03-12 DIAGNOSIS — K21.9 GASTROESOPHAGEAL REFLUX DISEASE WITHOUT ESOPHAGITIS: ICD-10-CM

## 2025-03-12 DIAGNOSIS — J30.9 CHRONIC ALLERGIC RHINITIS: Primary | ICD-10-CM

## 2025-03-12 PROCEDURE — 95115 IMMUNOTHERAPY ONE INJECTION: CPT | Mod: HCNC,S$GLB,, | Performed by: ALLERGY & IMMUNOLOGY

## 2025-03-13 RX ORDER — OMEPRAZOLE 40 MG/1
40 CAPSULE, DELAYED RELEASE ORAL 2 TIMES DAILY
Qty: 180 CAPSULE | Refills: 3 | Status: SHIPPED | OUTPATIENT
Start: 2025-03-13

## 2025-03-18 ENCOUNTER — OFFICE VISIT (OUTPATIENT)
Dept: UROLOGY | Facility: CLINIC | Age: 75
End: 2025-03-18
Payer: MEDICARE

## 2025-03-18 VITALS
SYSTOLIC BLOOD PRESSURE: 118 MMHG | DIASTOLIC BLOOD PRESSURE: 74 MMHG | HEART RATE: 87 BPM | BODY MASS INDEX: 42.02 KG/M2 | WEIGHT: 229.75 LBS

## 2025-03-18 DIAGNOSIS — N39.0 RECURRENT UTI: ICD-10-CM

## 2025-03-18 DIAGNOSIS — N28.1 CYST OF KIDNEY, ACQUIRED: Primary | ICD-10-CM

## 2025-03-18 DIAGNOSIS — N95.2 VAGINAL ATROPHY: ICD-10-CM

## 2025-03-18 PROCEDURE — 3078F DIAST BP <80 MM HG: CPT | Mod: CPTII,S$GLB,, | Performed by: UROLOGY

## 2025-03-18 PROCEDURE — 99999 PR PBB SHADOW E&M-EST. PATIENT-LVL IV: CPT | Mod: PBBFAC,,, | Performed by: UROLOGY

## 2025-03-18 PROCEDURE — 3074F SYST BP LT 130 MM HG: CPT | Mod: CPTII,S$GLB,, | Performed by: UROLOGY

## 2025-03-18 PROCEDURE — 3072F LOW RISK FOR RETINOPATHY: CPT | Mod: CPTII,S$GLB,, | Performed by: UROLOGY

## 2025-03-18 PROCEDURE — 1126F AMNT PAIN NOTED NONE PRSNT: CPT | Mod: CPTII,S$GLB,, | Performed by: UROLOGY

## 2025-03-18 PROCEDURE — 99215 OFFICE O/P EST HI 40 MIN: CPT | Mod: S$GLB,,, | Performed by: UROLOGY

## 2025-03-18 PROCEDURE — 1159F MED LIST DOCD IN RCRD: CPT | Mod: CPTII,S$GLB,, | Performed by: UROLOGY

## 2025-03-18 PROCEDURE — 4010F ACE/ARB THERAPY RXD/TAKEN: CPT | Mod: CPTII,S$GLB,, | Performed by: UROLOGY

## 2025-03-18 PROCEDURE — 3008F BODY MASS INDEX DOCD: CPT | Mod: CPTII,S$GLB,, | Performed by: UROLOGY

## 2025-03-18 PROCEDURE — 1157F ADVNC CARE PLAN IN RCRD: CPT | Mod: CPTII,S$GLB,, | Performed by: UROLOGY

## 2025-03-18 RX ORDER — ESTRADIOL 2 MG/1
SYSTEM VAGINAL
Qty: 1 EACH | Refills: 3 | Status: SHIPPED | OUTPATIENT
Start: 2025-03-18

## 2025-03-21 ENCOUNTER — HOSPITAL ENCOUNTER (OUTPATIENT)
Dept: RADIOLOGY | Facility: HOSPITAL | Age: 75
Discharge: HOME | End: 2025-03-21
Attending: UROLOGY
Payer: MEDICARE

## 2025-03-21 DIAGNOSIS — N28.1 CYST OF KIDNEY, ACQUIRED: ICD-10-CM

## 2025-03-21 PROCEDURE — 25500020 PHARM REV CODE 255: Mod: HCNC | Performed by: UROLOGY

## 2025-03-21 PROCEDURE — 74170 CT ABD WO CNTRST FLWD CNTRST: CPT | Mod: 26,HCNC,, | Performed by: RADIOLOGY

## 2025-03-21 PROCEDURE — 74170 CT ABD WO CNTRST FLWD CNTRST: CPT | Mod: TC,HCNC

## 2025-03-21 RX ADMIN — IOHEXOL 100 ML: 350 INJECTION, SOLUTION INTRAVENOUS at 10:03

## 2025-03-24 DIAGNOSIS — E11.22 TYPE 2 DIABETES MELLITUS WITH STAGE 3A CHRONIC KIDNEY DISEASE, WITH LONG-TERM CURRENT USE OF INSULIN: ICD-10-CM

## 2025-03-24 DIAGNOSIS — N18.31 TYPE 2 DIABETES MELLITUS WITH STAGE 3A CHRONIC KIDNEY DISEASE, WITH LONG-TERM CURRENT USE OF INSULIN: ICD-10-CM

## 2025-03-24 DIAGNOSIS — Z79.4 TYPE 2 DIABETES MELLITUS WITH STAGE 3A CHRONIC KIDNEY DISEASE, WITH LONG-TERM CURRENT USE OF INSULIN: ICD-10-CM

## 2025-03-24 RX ORDER — GLIPIZIDE 5 MG/1
5 TABLET ORAL
Qty: 180 TABLET | Refills: 3 | Status: SHIPPED | OUTPATIENT
Start: 2025-03-24

## 2025-03-24 RX ORDER — METFORMIN HYDROCHLORIDE 1000 MG/1
1000 TABLET ORAL 2 TIMES DAILY WITH MEALS
Qty: 180 TABLET | Refills: 3 | Status: SHIPPED | OUTPATIENT
Start: 2025-03-24

## 2025-03-24 NOTE — PROGRESS NOTES
History of Present Illness    CHIEF COMPLAINT:  Ms. Jose presents today for follow-up of genitourinary syndrome of menopause    GENITOURINARY:  She denies typical urinary tract infection symptoms including urinary burning, pulling sensation, or itching. She has difficulty distinguishing UTI symptoms from chronic back pain. She uses a vaginal ring but reports difficulty with insertion due to foreshortened vagina, with a portion of the ring remaining exposed.    MUSCULOSKELETAL:  She reports chronic back pain following vertebral fractures. She experiences persistent pain in the back region but is uncertain whether symptoms are related to previous vertebral injuries.    GASTROINTESTINAL:  She reports constipation with bowel movements occurring every 3 days. The initial portion of stool is particularly hard. She denies stool trapping or manual assistance with bowel movements. Previously tried Ozempic but discontinued due to severe constipation, experiencing up to 5 days without bowel movements.    DERMATOLOGIC:  She reports experiencing generalized itching all over her body. She previously discontinued Jardiance due to development of painful skin reaction with redness, inflammation, and bumps in the genital area, which resolved after discontinuation.    LABS AND IMAGING:  Abdominal ultrasound showed multiple renal cysts without kidney stones. Recent blood test showed mild dehydration, which she reports as chronic.      ROS:  General: -fever, -chills, -fatigue, -weight gain, -weight loss, +excessive thirst  Eyes: -vision changes, -redness, -discharge  ENT: -ear pain, -nasal congestion, -sore throat  Cardiovascular: -chest pain, -palpitations, -lower extremity edema  Respiratory: -cough, -shortness of breath  Gastrointestinal: -abdominal pain, -nausea, -vomiting, -diarrhea, +constipation, -blood in stool  Genitourinary: -dysuria, -hematuria, -frequency  Musculoskeletal: -joint pain, -muscle pain, +back pain  Skin: -rash,  -lesion, +itching  Neurological: -headache, -dizziness, -numbness, -tingling  Psychiatric: -anxiety, -depression, -sleep difficulty  Female Genitourinary: +vaginal dryness             Past Medical History:   Diagnosis Date    Allergy     Angio-edema     Arthritis     Cataract     bilateral - not removed    Cerebrovascular malformation     Cirrhosis 11/24/2020    Colon polyps     Diabetes mellitus, type 2     Diabetic peripheral neuropathy     Edema of both feet 8/19/2022    GERD (gastroesophageal reflux disease)     Herpes infection     Hypothyroidism     Kidney stones     Mild nonproliferative diabetic retinopathy of 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 edema associated with type 2 diabetes mellitus 4/18/2019    DEL RIO (nonalcoholic steatohepatitis) 8/19/2022    Nausea & vomiting 11/23/2015    Obesity, morbid     Ovarian cyst     Seizure disorder, focal motor     Sleep apnea     Type II or unspecified type diabetes mellitus with neurological manifestations, uncontrolled(250.62)     Urticaria        Past Surgical History:   Procedure Laterality Date    CARPAL TUNNEL RELEASE Right 2014    CATARACT EXTRACTION W/  INTRAOCULAR LENS IMPLANT Right 9/19/2023    Procedure: EXTRACTION, CATARACT, WITH IOL INSERTION;  Surgeon: Lyndon Ortiz MD;  Location: [facility] OR;  Service: Ophthalmology;  Laterality: Right;    CATARACT EXTRACTION W/  INTRAOCULAR LENS IMPLANT Left 10/3/2023    Procedure: EXTRACTION, CATARACT, WITH IOL INSERTION;  Surgeon: Lyndon Ortiz MD;  Location: [facility] OR;  Service: Ophthalmology;  Laterality: Left;    COLONOSCOPY      COLONOSCOPY N/A 9/13/2017    Procedure: COLONOSCOPY Golytely;  Surgeon: Gisela Wall MD;  Location: [facility] ENDO;  Service: Endoscopy;  Laterality: N/A;    COLONOSCOPY N/A 9/9/2022    Procedure: COLONOSCOPY Extended Suprep;  Surgeon: Britt Jasso MD;  Location: [facility] ENDO;  Service: Endoscopy;  Laterality: N/A;    CYST REMOVAL      skin; multiples     ESOPHAGOGASTRODUODENOSCOPY Left 10/15/2021    Procedure: EGD (ESOPHAGOGASTRODUODENOSCOPY);  Surgeon: Luis Fernando Taveras MD;  Location: [facility] ENDO (4TH FLR);  Service: Endoscopy;  Laterality: Left;  cirrhosis labwork am of procedure  last seizure 1973  COVID test on 10/12/21 at [facility]    ESOPHAGOGASTRODUODENOSCOPY N/A 3/10/2023    Procedure: EGD (ESOPHAGOGASTRODUODENOSCOPY);  Surgeon: Christa Caldera MD;  Location: [facility] ENDO (2ND FLR);  Service: Endoscopy;  Laterality: N/A;  Medically Urgent  cirrohsis-labs done on 2/9/23 (< 90 days)  New onset A-fib  3/1 instructions to portal-st  Precall complete- [place]    EXTRACORPOREAL SHOCK WAVE LITHOTRIPSY  2002    HYSTERECTOMY  1984    INJECTION, EPIDURAL, SPINE, CERVICOTHORACIC, TRANSFORAMINAL APPROACH Bilateral 1/28/2025    Procedure: b/l L1-l2 TFESI;  Surgeon: Myron Ocasio DO;  Location: [facility] PAIN MANAGEMENT;  Service: Pain Management;  Laterality: Bilateral;  ASA 5 days Ozempic 7 days    JOINT REPLACEMENT Right 03/06/2019    knee    KIDNEY STONE SURGERY      KNEE ARTHROPLASTY Right 3/6/2019    Procedure: ARTHROPLASTY, KNEE;  Surgeon: Pj Gamboa MD;  Location: [facility] OR;  Service: Orthopedics;  Laterality: Right;  Depuy (Stephen notified 2/21, CC)    KYPHOPLASTY, SPINE, LUMBAR Bilateral 9/23/2024    Procedure: T12 and L1 SpineJack ([place]);  Surgeon: Myron Ocasio DO;  Location: [facility] OR;  Service: Pain Management;  Laterality: Bilateral;    THYROIDECTOMY  1977         TONSILLECTOMY, ADENOIDECTOMY      TRIGGER FINGER RELEASE      TUBAL LIGATION         Family History   Problem Relation Name Age of Onset    Skin cancer Mother      Macular degeneration Mother      Dementia Mother      Hypertension Mother      Cancer Father      Arthritis Father      Gout Father      No Known Problems Daughter Madisyn     Diabetes Daughter Rudy     Hypertension Daughter Rudy     Arthritis Daughter Arianna     Allergies Son Lyndon     Allergic rhinitis Bertrand Haney      Glaucoma Maternal Aunt          Great Maternal Aunt    Leukemia Maternal Uncle       Social History[1]    Allergies:  Sulfa (sulfonamide antibiotics), Ciprofloxacin, Januvia [sitagliptin], Lisinopril, Lotensin [benazepril], and Nexium [esomeprazole magnesium]    Medications:  Encounter Medications[2]      PHYSICAL EXAMINATION:    The patient generally appears in good health, is appropriately interactive, and is in no apparent distress.    Skin: No lesions.    Mental: Cooperative with normal affect.    Neuro: Grossly intact.    HEENT: Normal. No evidence of lymphadenopathy.    Chest:  normal inspiratory effort.    Abdomen: Soft, non-tender. No masses or organomegaly. Bladder is not palpable. No evidence of flank discomfort. No evidence of inguinal hernia.    Extremities: No clubbing, cyanosis, or edema    NOTE:  the exam was carried out with a nurse chaperone present  Normal external female genitalia  Grade II urogenital atrophy  Urethral meatus is normal  Urethra and bladder are nontender to bimanual exam  Well supported anteriorly and posteriorly   Uterus and cervix are surgically absent  No adnexal masses  PVR by catheterization was 30 ml     LABS:    Lab Results   Component Value Date    BUN 29 (H) 02/27/2025    CREATININE 1.3 02/27/2025       UA 1.015, pH 6, otherwise, negative.       Assessment & Plan    IMPRESSION:  - Evaluated genitourinary syndrome of menopause and assessed concerns about potential UTI symptoms.  - Reviewed recent abdominal US showing renal cysts.  - Considered recent BUN and creatinine levels (BUN 29, creatinine 1.3).  - Assessed vaginal anatomy, noting foreshortened vagina and mild rectocele.    GENITOURINARY SYNDROME OF MENOPAUSE:  - Continued estradiol ring for genitourinary symptoms and UTI prevention, to be replaced every 3 months.  - Discussed benefits of topical estradiol ring over oral estrogen for genitourinary symptoms.    CYST OF KIDNEY:  - Ordered CT Abdomen with and without IV  "contrast to further evaluate renal cysts.    DEHYDRATION AND HYDRATION:  - Ms. Jose to increase fluid intake, especially before CT.  - Explained importance of hydration before CT W Contrast to protect kidneys.    CONSTIPATION AND GUT HEALTH:  - Ms. Jose to include 3 different kinds of fermented foods in diet.  - Educated on "eating the rainbow" for gut health, recommending 30 different types of fruits and vegetables weekly, including seeds and nuts.  - Advised on incorporating fermented foods like refrigerated pickles, sauerkraut, kimchi, and yogurt for gut health.    ALLERGY AND MEDICATION SIDE EFFECTS:  - Explained potential link between Jardiance and yeast infections due to glucose spillage in urine.    FOLLOW-UP:  - Follow up in 1 year      I spent a total of 40 minutes on the day of the visit.  This includes face to face time and non-face to face time preparing to see the patient (eg, review of tests), obtaining and/or reviewing separately obtained history, documenting clinical information in the electronic or other health record, independently interpreting results and communicating results to the patient/family/caregiver, or care coordinator.    Visit complexity today is associated with medical care services that are part of the ongoing care related to the single serious and/or complex condition of Recurrent urinary tract infections (Wes), Genitourinary syndrome of menopause (GSM), and renal cysts . A longitudinal relationship exists or is being developed between the patient and this practitioner for the care of this condition.       This note was generated with the assistance of ambient listening technology. Verbal consent was obtained by the patient and accompanying visitor(s) for the recording of patient appointment to facilitate this note. I attest to having reviewed and edited the generated note for accuracy, though some syntax or spelling errors may persist. Please contact the author of this note for " any clarification.             [1]   Social History  Socioeconomic History    Marital status:    Occupational History    Occupation: retired     Employer: [employer]   Tobacco Use    Smoking status: Never     Passive exposure: Never    Smokeless tobacco: Never   Substance and Sexual Activity    Alcohol use: No     Alcohol/week: 0.0 standard drinks of alcohol    Drug use: No    Sexual activity: Yes     Partners: Male     Social Drivers of Health     Financial Resource Strain: Low Risk  (1/17/2025)    Overall Financial Resource Strain (CARDIA)     Difficulty of Paying Living Expenses: Not hard at all   Food Insecurity: No Food Insecurity (1/17/2025)    Hunger Vital Sign     Worried About Running Out of Food in the Last Year: Never true     Ran Out of Food in the Last Year: Never true   Transportation Needs: No Transportation Needs (8/9/2024)    PRAPARE - Transportation     Lack of Transportation (Medical): No     Lack of Transportation (Non-Medical): No   Physical Activity: Inactive (1/17/2025)    Exercise Vital Sign     Days of Exercise per Week: 0 days     Minutes of Exercise per Session: 0 min   Stress: No Stress Concern Present (1/17/2025)    [employer] - Occupational Stress Questionnaire     Feeling of Stress : Only a little   Housing Stability: Unknown (1/17/2025)    Housing Stability Vital Sign     Unable to Pay for Housing in the Last Year: No     Homeless in the Last Year: No   [2]   Outpatient Encounter Medications as of 3/18/2025   Medication Sig Dispense Refill    abaloparatide (TYMLOS) 80 mcg (3,120 mcg/1.56 mL) PnIj Inject 0.04 mLs (80 mcg total) into the skin once daily. 1.56 mL 11    ascorbic acid, vitamin C, (VITAMIN C) 100 MG tablet Take 500 mg by mouth 2 (two) times daily.       aspirin (ECOTRIN) 81 MG EC tablet Take 81 mg by mouth once daily.      azelastine (ASTELIN) 137 mcg (0.1 %) nasal spray 2 sprays (274 mcg total) by Nasal route 2 (two) times  daily. 120 mL 4    betamethasone dipropionate (DIPROLENE) 0.05 % ointment Apply topically 2 (two) times daily. Use to affected areas for up to 2 weeks then take a 1 week break or decrease to 3 times weekly. Do not apply to groin or face. Use to spot on ear when flaring 15 g 2    blood sugar diagnostic (TRUE METRIX GLUCOSE TEST STRIP) Strp TEST TWO TIMES DAILY 200 strip 6    blood-glucose meter Misc Human True Metrix Air meter 1 each 0    DULoxetine (CYMBALTA) 60 MG capsule Take 1 capsule (60 mg total) by mouth once daily. 30 capsule 11    estradioL (ESTRING) 2 mg (7.5 mcg /24 hour) vaginal ring Remove old Estring, place the new one every 3 months. 1 each 3    fluticasone propionate (FLONASE) 50 mcg/actuation nasal spray 2 sprays (100 mcg total) by Each Nostril route once daily. 48 g 2    furosemide (LASIX) 20 MG tablet Take 1 tablet (20 mg total) by mouth once daily. 30 tablet 11    gabapentin (NEURONTIN) 600 MG tablet TAKE 1 TABLET THREE TIMES DAILY 180 tablet 5    ipratropium (ATROVENT) 42 mcg (0.06 %) nasal spray 2 sprays by Each Nostril route 4 (four) times daily. 45 mL 4    ketotifen (ZADITOR) 0.025 % (0.035 %) ophthalmic solution Place 1-2 drops into both eyes once daily.      L.acid-B.bifidum-B.animal-FOS 25 billion cell -100 mg Cap Take 1 capsule by mouth once daily.      lactulose (CHRONULAC) 20 gram/30 mL Soln Take 30 mLs (20 g total) by mouth 2 (two) times daily as needed (titrate to have 2-3 bowl movements per day). 3000 mL 11    lancets (TRUEPLUS LANCETS) 28 gauge [facility] Inject 1 lancet into the skin 2 (two) times daily before meals. 200 each 6    levothyroxine (SYNTHROID) 75 MCG tablet Take 1 tablet (75 mcg total) by mouth once daily. 90 tablet 3    loratadine (CLARITIN) 10 mg tablet Take 10 mg by mouth once daily.      lovastatin (MEVACOR) 40 MG tablet TAKE 1 TABLET NIGHTLY (SUBSTITUTED FOR MEVACOR) 90 tablet 3    mv-mn/folic acid/vit [id number] (ALIVE ONCE DAILY WOMEN 50 PLUS ORAL) Take 1 tablet by  "mouth once daily.      omeprazole (PRILOSEC) 40 MG capsule Take 1 capsule (40 mg total) by mouth 2 (two) times a day. 180 capsule 3    pen needle, diabetic (BD ULTRA-FINE EDUARDO PEN NEEDLE) 32 gauge x 5/32" Ndle Inject 1 Needle into the skin once daily. 100 each 6    semaglutide (OZEMPIC) 1 mg/dose (4 mg/3 mL) Inject 1 mg into the skin every 7 days. 3 mL 11    spironolactone (ALDACTONE) 50 MG tablet Take 1 tablet (50 mg total) by mouth every other day. 15 tablet 11    tiZANidine (ZANAFLEX) 4 MG tablet Take 1-2 tablets (4-8 mg total) by mouth nightly as needed (muscle cramps / back pain). 180 tablet 0    TRESIBA FLEXTOUCH U-100 100 unit/mL (3 mL) insulin pen Inject 32 Units into the skin once daily. 30 mL 3    UNABLE TO FIND Take 1 tablet by mouth 2 (two) times a day. medication name: Magnesium 400mg, Calcium 1000 mg, D3 15mcg, Zinc 15mg      valACYclovir (VALTREX) 500 MG tablet Take 1 tablet (500 mg total) by mouth once daily. 90 tablet 3    valsartan (DIOVAN) 160 MG tablet Take 1 tablet (160 mg total) by mouth once daily. 90 tablet 1    [DISCONTINUED] estradioL (ESTRING) 2 mg (7.5 mcg /24 hour) vaginal ring Remove old Estring, place the new one every 3 months. 1 each 0    [DISCONTINUED] glipiZIDE (GLUCOTROL) 5 MG tablet TAKE 1 TABLET TWICE DAILY BEFORE MEALS 180 tablet 1    [DISCONTINUED] metFORMIN (GLUCOPHAGE) 1000 MG tablet Take 1 tablet (1,000 mg total) by mouth 2 (two) times daily with meals. 180 tablet 1     Facility-Administered Encounter Medications as of 3/18/2025   Medication Dose Route Frequency Provider Last Rate Last Admin    capsaicin-skin cleanser patch 4 patch  4 patch Topical (Top) 1 time in Clinic/HOD          "

## 2025-03-26 ENCOUNTER — TELEPHONE (OUTPATIENT)
Dept: ENDOSCOPY | Facility: HOSPITAL | Age: 75
End: 2025-03-26

## 2025-03-26 ENCOUNTER — CLINICAL SUPPORT (OUTPATIENT)
Dept: ENDOSCOPY | Facility: HOSPITAL | Age: 75
End: 2025-03-26
Attending: INTERNAL MEDICINE
Payer: MEDICARE

## 2025-03-26 VITALS — HEIGHT: 62 IN | BODY MASS INDEX: 42.14 KG/M2 | WEIGHT: 229 LBS

## 2025-03-26 DIAGNOSIS — K74.60 HEPATIC CIRRHOSIS, UNSPECIFIED HEPATIC CIRRHOSIS TYPE, UNSPECIFIED WHETHER ASCITES PRESENT: ICD-10-CM

## 2025-03-26 NOTE — TELEPHONE ENCOUNTER
Referral for procedure from PAT appointment      Spoke to patient to schedule procedure(s) Upper Endoscopy (EGD)       Physician to perform procedure(s) Dr. AROLDO Gilbert  Date of Procedure (s) 4/17/25  Arrival Time 11:00 AM  Time of Procedure(s) 12:00 PM   Location of Procedure(s) [place] [address] Floor  Type of Rx Prep sent to patient: N/A  Instructions provided to patient via [facility]    Patient was informed on the following information and verbalized understanding. Screening questionnaire reviewed with patient and complete. If procedure requires anesthesia, a responsible adult needs to be present to accompany the patient home, patient cannot drive after receiving anesthesia. Appointment details are tentative, especially check-in time. Patient will receive a prep-op call 7 days prior to confirm check-in time for procedure. If applicable the patient should contact their pharmacy to verify Rx for procedure prep is ready for pick-up. Patient was advised to call the scheduling department at [phone] if pharmacy states no Rx is available. Patient was advised to call the endoscopy scheduling department if any questions or concerns arise.      [facility] Scheduling Department

## 2025-04-07 ENCOUNTER — OFFICE VISIT (OUTPATIENT)
Dept: INTERNAL MEDICINE | Facility: CLINIC | Age: 75
End: 2025-04-07
Payer: MEDICARE

## 2025-04-07 ENCOUNTER — PATIENT MESSAGE (OUTPATIENT)
Dept: UROLOGY | Facility: CLINIC | Age: 75
End: 2025-04-07
Payer: MEDICARE

## 2025-04-07 ENCOUNTER — RESULTS FOLLOW-UP (OUTPATIENT)
Dept: TRANSPLANT | Facility: CLINIC | Age: 75
End: 2025-04-07

## 2025-04-07 ENCOUNTER — RESULTS FOLLOW-UP (OUTPATIENT)
Dept: INTERNAL MEDICINE | Facility: CLINIC | Age: 75
End: 2025-04-07

## 2025-04-07 ENCOUNTER — HOSPITAL ENCOUNTER (OUTPATIENT)
Dept: RADIOLOGY | Facility: HOSPITAL | Age: 75
Discharge: HOME | End: 2025-04-07
Attending: STUDENT IN AN ORGANIZED HEALTH CARE EDUCATION/TRAINING PROGRAM
Payer: MEDICARE

## 2025-04-07 VITALS
TEMPERATURE: 98 F | BODY MASS INDEX: 41.78 KG/M2 | HEART RATE: 74 BPM | WEIGHT: 227.06 LBS | RESPIRATION RATE: 18 BRPM | HEIGHT: 62 IN | DIASTOLIC BLOOD PRESSURE: 60 MMHG | SYSTOLIC BLOOD PRESSURE: 128 MMHG | OXYGEN SATURATION: 100 %

## 2025-04-07 DIAGNOSIS — R05.9 COUGH: ICD-10-CM

## 2025-04-07 DIAGNOSIS — J06.9 UPPER RESPIRATORY TRACT INFECTION, UNSPECIFIED TYPE: ICD-10-CM

## 2025-04-07 DIAGNOSIS — J06.9 UPPER RESPIRATORY TRACT INFECTION, UNSPECIFIED TYPE: Primary | ICD-10-CM

## 2025-04-07 LAB
CTP QC/QA: YES
INFLUENZA A MOLECULAR (OHS): NEGATIVE
INFLUENZA B MOLECULAR (OHS): NEGATIVE
MOLECULAR STREP A: NEGATIVE
SARS-COV-2 RNA RESP QL NAA+PROBE: NEGATIVE

## 2025-04-07 PROCEDURE — 3008F BODY MASS INDEX DOCD: CPT | Mod: HCNC,CPTII,S$GLB, | Performed by: STUDENT IN AN ORGANIZED HEALTH CARE EDUCATION/TRAINING PROGRAM

## 2025-04-07 PROCEDURE — 99214 OFFICE O/P EST MOD 30 MIN: CPT | Mod: HCNC,S$GLB,, | Performed by: STUDENT IN AN ORGANIZED HEALTH CARE EDUCATION/TRAINING PROGRAM

## 2025-04-07 PROCEDURE — G2211 COMPLEX E/M VISIT ADD ON: HCPCS | Mod: GZ,HCNC,S$GLB, | Performed by: STUDENT IN AN ORGANIZED HEALTH CARE EDUCATION/TRAINING PROGRAM

## 2025-04-07 PROCEDURE — 3288F FALL RISK ASSESSMENT DOCD: CPT | Mod: HCNC,CPTII,S$GLB, | Performed by: STUDENT IN AN ORGANIZED HEALTH CARE EDUCATION/TRAINING PROGRAM

## 2025-04-07 PROCEDURE — 87635 SARS-COV-2 COVID-19 AMP PRB: CPT | Mod: HCNC | Performed by: STUDENT IN AN ORGANIZED HEALTH CARE EDUCATION/TRAINING PROGRAM

## 2025-04-07 PROCEDURE — 3072F LOW RISK FOR RETINOPATHY: CPT | Mod: HCNC,CPTII,S$GLB, | Performed by: STUDENT IN AN ORGANIZED HEALTH CARE EDUCATION/TRAINING PROGRAM

## 2025-04-07 PROCEDURE — 3074F SYST BP LT 130 MM HG: CPT | Mod: HCNC,CPTII,S$GLB, | Performed by: STUDENT IN AN ORGANIZED HEALTH CARE EDUCATION/TRAINING PROGRAM

## 2025-04-07 PROCEDURE — 1101F PT FALLS ASSESS-DOCD LE1/YR: CPT | Mod: HCNC,CPTII,S$GLB, | Performed by: STUDENT IN AN ORGANIZED HEALTH CARE EDUCATION/TRAINING PROGRAM

## 2025-04-07 PROCEDURE — 71046 X-RAY EXAM CHEST 2 VIEWS: CPT | Mod: 26,HCNC,, | Performed by: RADIOLOGY

## 2025-04-07 PROCEDURE — 1157F ADVNC CARE PLAN IN RCRD: CPT | Mod: HCNC,CPTII,S$GLB, | Performed by: STUDENT IN AN ORGANIZED HEALTH CARE EDUCATION/TRAINING PROGRAM

## 2025-04-07 PROCEDURE — 3078F DIAST BP <80 MM HG: CPT | Mod: HCNC,CPTII,S$GLB, | Performed by: STUDENT IN AN ORGANIZED HEALTH CARE EDUCATION/TRAINING PROGRAM

## 2025-04-07 PROCEDURE — 4010F ACE/ARB THERAPY RXD/TAKEN: CPT | Mod: HCNC,CPTII,S$GLB, | Performed by: STUDENT IN AN ORGANIZED HEALTH CARE EDUCATION/TRAINING PROGRAM

## 2025-04-07 PROCEDURE — 87651 STREP A DNA AMP PROBE: CPT | Mod: QW,HCNC,S$GLB, | Performed by: STUDENT IN AN ORGANIZED HEALTH CARE EDUCATION/TRAINING PROGRAM

## 2025-04-07 PROCEDURE — 99999 PR PBB SHADOW E&M-EST. PATIENT-LVL V: CPT | Mod: PBBFAC,HCNC,, | Performed by: STUDENT IN AN ORGANIZED HEALTH CARE EDUCATION/TRAINING PROGRAM

## 2025-04-07 PROCEDURE — 71046 X-RAY EXAM CHEST 2 VIEWS: CPT | Mod: TC,HCNC,PO

## 2025-04-07 PROCEDURE — 1159F MED LIST DOCD IN RCRD: CPT | Mod: HCNC,CPTII,S$GLB, | Performed by: STUDENT IN AN ORGANIZED HEALTH CARE EDUCATION/TRAINING PROGRAM

## 2025-04-07 PROCEDURE — 87502 INFLUENZA DNA AMP PROBE: CPT | Mod: HCNC | Performed by: STUDENT IN AN ORGANIZED HEALTH CARE EDUCATION/TRAINING PROGRAM

## 2025-04-07 PROCEDURE — 1126F AMNT PAIN NOTED NONE PRSNT: CPT | Mod: HCNC,CPTII,S$GLB, | Performed by: STUDENT IN AN ORGANIZED HEALTH CARE EDUCATION/TRAINING PROGRAM

## 2025-04-07 RX ORDER — PREDNISONE 20 MG/1
20 TABLET ORAL 2 TIMES DAILY
Qty: 8 TABLET | Refills: 0 | Status: SHIPPED | OUTPATIENT
Start: 2025-04-07 | End: 2025-04-11

## 2025-04-07 RX ORDER — GUAIFENESIN 600 MG/1
1200 TABLET, EXTENDED RELEASE ORAL 2 TIMES DAILY
COMMUNITY

## 2025-04-07 NOTE — PROGRESS NOTES
Subjective:    The following note was written in combination with deep-scribe software and dictation (to which understanding and consent was verbally expressed); please excuse any transcription and/or dictation errors.      Chief Complaint: Cough (Green/ yellow mucus /Started 4-3-25) and swollen glands    HPI  Vivienne Jose is a 74 y.o. with HTN, type 2 DM, HLD, DEL RIO, hypothyroidism (s/p thyroidectomy due to cysts), nephrolithiasis, recurrent UTIs (sees dr. Cee), sleep apnea, neuropathy presentingg for evaluation of a presumed sinus infection:    URI:  - reporting for days of minimally productive (green/yellow) cough, tender cervical lymphadenopathy, and T-max of 100.6° (responsive to acetaminophen)  - no known ill contacts, although she did have grandchildren visit from out of state just prior to the illness    Review of Systems   Constitutional:  Positive for fever. Negative for appetite change and chills.   HENT:  Positive for nasal congestion and postnasal drip.    Respiratory:  Positive for cough. Negative for chest tightness and shortness of breath.    Cardiovascular:  Negative for chest pain, palpitations and leg swelling.   Gastrointestinal:  Negative for abdominal distention, abdominal pain, blood in stool, constipation, diarrhea, nausea and vomiting.   Endocrine: Negative.    Genitourinary:  Negative for difficulty urinating, dysuria, frequency and hematuria.   Musculoskeletal: Negative.    Integumentary:  Negative.   Neurological: Negative.    Psychiatric/Behavioral: Negative.           Objective:      Vitals:    04/07/25 0909   BP: 128/60   Pulse: 74   Resp: 18   Temp: 97.7 °F (36.5 °C)      Physical Exam  Vitals reviewed.   Constitutional:       General: She is not in acute distress.     Appearance: Normal appearance.   HENT:      Head: Normocephalic and atraumatic.      Comments: Facial features are symmetric      Nose: Nose normal. No congestion or rhinorrhea.      Mouth/Throat:      Mouth: Mucous  membranes are moist.      Pharynx: Oropharynx is clear. No oropharyngeal exudate or posterior oropharyngeal erythema.   Eyes:      General: No scleral icterus.     Extraocular Movements: Extraocular movements intact.      Conjunctiva/sclera: Conjunctivae normal.   Cardiovascular:      Rate and Rhythm: Normal rate and regular rhythm.      Pulses: Normal pulses.      Heart sounds: Normal heart sounds.   Pulmonary:      Effort: Pulmonary effort is normal. No respiratory distress.      Breath sounds: Normal breath sounds.   Musculoskeletal:         General: No deformity or signs of injury. Normal range of motion.      Cervical back: Normal range of motion.   Skin:     General: Skin is warm and dry.      Findings: No rash.   Neurological:      General: No focal deficit present.      Mental Status: She is alert and oriented to person, place, and time. Mental status is at baseline.   Psychiatric:         Mood and Affect: Mood normal.         Behavior: Behavior normal.         Thought Content: Thought content normal.       Medications Ordered Prior to Encounter[1]      Assessment:       1. Upper respiratory tract infection, unspecified type    2. Cough        Plan:       Upper respiratory tract infection, unspecified type  -     Comprehensive Metabolic Panel; Future; Expected date: 04/07/2025  -     CBC Auto Differential; Future; Expected date: 04/07/2025  -     X-Ray Chest PA And Lateral; Future; Expected date: 04/07/2025  -     Influenza A & B by Molecular  -     POCT Strep A, Molecular   - although her appearance/physical exam was generally very reassuring, will screen and treat more aggressively given underlying DEL RIO as above; can transition oral steroids to more specific agent if procalcitonin chest x-ray returns concerning and/or COVID test returned positive   - rapid strep returned negative prior to completion of this documentation.      Cough  -     COVID-19 Routine Screening    Other orders  -     predniSONE  (DELTASONE) 20 MG tablet; Take 1 tablet (20 mg total) by mouth 2 (two) times daily. for 4 days  Dispense: 8 tablet; Refill: 0                  [1]   Current Outpatient Medications on File Prior to Visit   Medication Sig Dispense Refill    abaloparatide (TYMLOS) 80 mcg (3,120 mcg/1.56 mL) PnIj Inject 0.04 mLs (80 mcg total) into the skin once daily. 1.56 mL 11    ascorbic acid, vitamin C, (VITAMIN C) 100 MG tablet Take 500 mg by mouth 2 (two) times daily.       aspirin (ECOTRIN) 81 MG EC tablet Take 81 mg by mouth once daily.      azelastine (ASTELIN) 137 mcg (0.1 %) nasal spray 2 sprays (274 mcg total) by Nasal route 2 (two) times daily. 120 mL 4    betamethasone dipropionate (DIPROLENE) 0.05 % ointment Apply topically 2 (two) times daily. Use to affected areas for up to 2 weeks then take a 1 week break or decrease to 3 times weekly. Do not apply to groin or face. Use to spot on ear when flaring 15 g 2    blood sugar diagnostic (TRUE METRIX GLUCOSE TEST STRIP) Strp TEST TWO TIMES DAILY 200 strip 6    blood-glucose meter Misc [employer] True Metrix Air meter 1 each 0    DULoxetine (CYMBALTA) 60 MG capsule Take 1 capsule (60 mg total) by mouth once daily. 30 capsule 11    estradioL (ESTRING) 2 mg (7.5 mcg /24 hour) vaginal ring Remove old Estring, place the new one every 3 months. 1 each 3    fluticasone propionate (FLONASE) 50 mcg/actuation nasal spray 2 sprays (100 mcg total) by Each Nostril route once daily. 48 g 2    furosemide (LASIX) 20 MG tablet Take 1 tablet (20 mg total) by mouth once daily. 30 tablet 11    gabapentin (NEURONTIN) 600 MG tablet TAKE 1 TABLET THREE TIMES DAILY (Patient taking differently: Take 600 mg by mouth 3 (three) times daily.) 180 tablet 5    glipiZIDE (GLUCOTROL) 5 MG tablet TAKE 1 TABLET TWICE DAILY BEFORE MEALS 180 tablet 3    guaiFENesin (MUCINEX) 600 mg 12 hr tablet Take 1,200 mg by mouth 2 (two) times daily.      ipratropium (ATROVENT) 42 mcg (0.06 %) nasal spray 2 sprays by Each  "Nostril route 4 (four) times daily. 45 mL 4    ketotifen (ZADITOR) 0.025 % (0.035 %) ophthalmic solution Place 1-2 drops into both eyes once daily.      lancets (TRUEPLUS LANCETS) 28 gauge Misc Inject 1 lancet into the skin 2 (two) times daily before meals. 200 each 6    levothyroxine (SYNTHROID) 75 MCG tablet Take 1 tablet (75 mcg total) by mouth once daily. 90 tablet 3    loratadine (CLARITIN) 10 mg tablet Take 10 mg by mouth once daily.      lovastatin (MEVACOR) 40 MG tablet TAKE 1 TABLET NIGHTLY (SUBSTITUTED FOR MEVACOR) 90 tablet 3    metFORMIN (GLUCOPHAGE) 1000 MG tablet TAKE 1 TABLET TWICE DAILY WITH MEALS 180 tablet 3    mv-mn/folic acid/vit [id number] (ALIVE ONCE DAILY WOMEN 50 PLUS ORAL) Take 1 tablet by mouth once daily.      omeprazole (PRILOSEC) 40 MG capsule Take 1 capsule (40 mg total) by mouth 2 (two) times a day. 180 capsule 3    pen needle, diabetic (BD ULTRA-FINE EDUARDO PEN NEEDLE) 32 gauge x 5/32" Ndle Inject 1 Needle into the skin once daily. 100 each 6    semaglutide (OZEMPIC) 1 mg/dose (4 mg/3 mL) Inject 1 mg into the skin every 7 days. 3 mL 11    spironolactone (ALDACTONE) 50 MG tablet Take 1 tablet (50 mg total) by mouth every other day. 15 tablet 11    tiZANidine (ZANAFLEX) 4 MG tablet Take 1-2 tablets (4-8 mg total) by mouth nightly as needed (muscle cramps / back pain). 180 tablet 0    TRESIBA FLEXTOUCH U-100 100 unit/mL (3 mL) insulin pen Inject 32 Units into the skin once daily. 30 mL 3    UNABLE TO FIND Take 1 tablet by mouth 2 (two) times a day. medication name: Magnesium 400mg, Calcium 1000 mg, D3 15mcg, Zinc 15mg      valACYclovir (VALTREX) 500 MG tablet Take 1 tablet (500 mg total) by mouth once daily. 90 tablet 3    valsartan (DIOVAN) 160 MG tablet Take 1 tablet (160 mg total) by mouth once daily. 90 tablet 1    L.acid-B.bifidum-B.animal-FOS 25 billion cell -100 mg Cap Take 1 capsule by mouth once daily.      lactulose (CHRONULAC) 20 gram/30 mL Soln Take 30 mLs (20 g total) by mouth " 2 (two) times daily as needed (titrate to have 2-3 bowl movements per day). (Patient not taking: Reported on 4/7/2025) 3000 mL 11     Current Facility-Administered Medications on File Prior to Visit   Medication Dose Route Frequency Provider Last Rate Last Admin    capsaicin-skin cleanser patch 4 patch  4 patch Topical (Top) 1 time in Clinic/HOD

## 2025-04-08 ENCOUNTER — PATIENT MESSAGE (OUTPATIENT)
Dept: ALLERGY | Facility: CLINIC | Age: 75
End: 2025-04-08
Payer: MEDICARE

## 2025-04-14 ENCOUNTER — PATIENT MESSAGE (OUTPATIENT)
Dept: ADMINISTRATIVE | Facility: OTHER | Age: 75
End: 2025-04-14
Payer: MEDICARE

## 2025-04-15 ENCOUNTER — TELEPHONE (OUTPATIENT)
Dept: ENDOSCOPY | Facility: HOSPITAL | Age: 75
End: 2025-04-15
Payer: MEDICARE

## 2025-04-15 NOTE — TELEPHONE ENCOUNTER
Spoke with patient about EGD appt and arrival time @ 1100 on Thurs, 04/17/25. Off Ozempic as directed (04/09/25).

## 2025-04-16 ENCOUNTER — CLINICAL SUPPORT (OUTPATIENT)
Dept: ALLERGY | Facility: CLINIC | Age: 75
End: 2025-04-16
Payer: MEDICARE

## 2025-04-16 DIAGNOSIS — J30.9 CHRONIC ALLERGIC RHINITIS: Primary | ICD-10-CM

## 2025-04-16 PROCEDURE — 95115 IMMUNOTHERAPY ONE INJECTION: CPT | Mod: S$GLB,,, | Performed by: ALLERGY & IMMUNOLOGY

## 2025-04-17 ENCOUNTER — TELEPHONE (OUTPATIENT)
Dept: GASTROENTEROLOGY | Facility: HOSPITAL | Age: 75
End: 2025-04-17
Payer: MEDICARE

## 2025-04-17 ENCOUNTER — HOSPITAL ENCOUNTER (OUTPATIENT)
Facility: HOSPITAL | Age: 75
Discharge: HOME | End: 2025-04-17
Attending: INTERNAL MEDICINE | Admitting: INTERNAL MEDICINE
Payer: MEDICARE

## 2025-04-17 ENCOUNTER — ANESTHESIA EVENT (OUTPATIENT)
Dept: ENDOSCOPY | Facility: HOSPITAL | Age: 75
End: 2025-04-17
Payer: MEDICARE

## 2025-04-17 ENCOUNTER — ANESTHESIA (OUTPATIENT)
Dept: ENDOSCOPY | Facility: HOSPITAL | Age: 75
End: 2025-04-17
Payer: MEDICARE

## 2025-04-17 VITALS
SYSTOLIC BLOOD PRESSURE: 130 MMHG | BODY MASS INDEX: 42.33 KG/M2 | DIASTOLIC BLOOD PRESSURE: 64 MMHG | TEMPERATURE: 98 F | WEIGHT: 230 LBS | HEART RATE: 72 BPM | RESPIRATION RATE: 12 BRPM | HEIGHT: 62 IN | OXYGEN SATURATION: 96 %

## 2025-04-17 DIAGNOSIS — R10.13 EPIGASTRIC PAIN: ICD-10-CM

## 2025-04-17 LAB
GLUCOSE SERPL-MCNC: 194 MG/DL (ref 70–110)
POCT GLUCOSE: 194 MG/DL (ref 70–110)

## 2025-04-17 PROCEDURE — D9220A PRA ANESTHESIA: Mod: ANES,,, | Performed by: STUDENT IN AN ORGANIZED HEALTH CARE EDUCATION/TRAINING PROGRAM

## 2025-04-17 PROCEDURE — 37000008 HC ANESTHESIA 1ST 15 MINUTES: Performed by: INTERNAL MEDICINE

## 2025-04-17 PROCEDURE — 43249 ESOPH EGD DILATION <30 MM: CPT | Performed by: INTERNAL MEDICINE

## 2025-04-17 PROCEDURE — 63600175 PHARM REV CODE 636 W HCPCS: Performed by: NURSE ANESTHETIST, CERTIFIED REGISTERED

## 2025-04-17 PROCEDURE — 37000009 HC ANESTHESIA EA ADD 15 MINS: Performed by: INTERNAL MEDICINE

## 2025-04-17 PROCEDURE — 27201012 HC FORCEPS, HOT/COLD, DISP: Performed by: INTERNAL MEDICINE

## 2025-04-17 PROCEDURE — 25000003 PHARM REV CODE 250: Performed by: NURSE ANESTHETIST, CERTIFIED REGISTERED

## 2025-04-17 PROCEDURE — 43249 ESOPH EGD DILATION <30 MM: CPT | Mod: ,,, | Performed by: INTERNAL MEDICINE

## 2025-04-17 PROCEDURE — D9220A PRA ANESTHESIA: Mod: CRNA,,, | Performed by: NURSE ANESTHETIST, CERTIFIED REGISTERED

## 2025-04-17 PROCEDURE — C1726 CATH, BAL DIL, NON-VASCULAR: HCPCS | Performed by: INTERNAL MEDICINE

## 2025-04-17 RX ORDER — PROPOFOL 10 MG/ML
VIAL (ML) INTRAVENOUS
Status: DISCONTINUED | OUTPATIENT
Start: 2025-04-17 | End: 2025-04-17

## 2025-04-17 RX ORDER — PROPOFOL 10 MG/ML
VIAL (ML) INTRAVENOUS CONTINUOUS PRN
Status: DISCONTINUED | OUTPATIENT
Start: 2025-04-17 | End: 2025-04-17

## 2025-04-17 RX ORDER — SODIUM CHLORIDE 0.9 % (FLUSH) 0.9 %
10 SYRINGE (ML) INJECTION
Status: DISCONTINUED | OUTPATIENT
Start: 2025-04-17 | End: 2025-04-17 | Stop reason: HOSPADM

## 2025-04-17 RX ORDER — SODIUM CHLORIDE 9 MG/ML
INJECTION, SOLUTION INTRAVENOUS CONTINUOUS
Status: DISCONTINUED | OUTPATIENT
Start: 2025-04-17 | End: 2025-04-17 | Stop reason: HOSPADM

## 2025-04-17 RX ORDER — LIDOCAINE HYDROCHLORIDE 20 MG/ML
INJECTION INTRAVENOUS
Status: DISCONTINUED | OUTPATIENT
Start: 2025-04-17 | End: 2025-04-17

## 2025-04-17 RX ORDER — ONDANSETRON HYDROCHLORIDE 2 MG/ML
INJECTION, SOLUTION INTRAVENOUS
Status: DISCONTINUED | OUTPATIENT
Start: 2025-04-17 | End: 2025-04-17

## 2025-04-17 RX ORDER — TOPICAL ANESTHETIC 200 MG/ML
SPRAY DENTAL; PERIODONTAL
Status: DISCONTINUED | OUTPATIENT
Start: 2025-04-17 | End: 2025-04-17

## 2025-04-17 RX ADMIN — PROPOFOL 50 MG: 10 INJECTION, EMULSION INTRAVENOUS at 12:04

## 2025-04-17 RX ADMIN — TOPICAL ANESTHETIC 1 EACH: 200 SPRAY DENTAL; PERIODONTAL at 11:04

## 2025-04-17 RX ADMIN — LIDOCAINE HYDROCHLORIDE 100 MG: 20 INJECTION, SOLUTION INTRAVENOUS at 12:04

## 2025-04-17 RX ADMIN — PROPOFOL 150 MCG/KG/MIN: 10 INJECTION, EMULSION INTRAVENOUS at 12:04

## 2025-04-17 RX ADMIN — SODIUM CHLORIDE: 0.9 INJECTION, SOLUTION INTRAVENOUS at 11:04

## 2025-04-17 RX ADMIN — ONDANSETRON 8 MG: 2 INJECTION, SOLUTION INTRAMUSCULAR; INTRAVENOUS at 11:04

## 2025-04-17 NOTE — [employer] PATIENT INSTRUCTIONS
Discharge Summary/Instructions after an Endoscopic Procedure  Patient Name: Vivienne Jose  Patient MRN: [id number]  Patient YOB: 1950 Thursday, April 17, 2025  Alec Gilbert MD  Dear patient,  As a result of recent federal legislation (The Federal Cures Act), you may   receive lab or pathology results from your procedure in your [facility]   account before your physician is able to contact you. Your physician or   their representative will relay the results to you with their   recommendations at their soonest availability.  Thank you,  Your health is very important to us during the Covid Crisis. Following your   procedure today, you will receive a daily text for 2 weeks asking about   signs or symptoms of Covid 19.  Please respond to this text when you   receive it so we can follow up and keep you as safe as possible.   RESTRICTIONS:  During your procedure today, you received medications for sedation.  These   medications may affect your judgment, balance and coordination.  Therefore,   for 24 hours, you have the following restrictions:   - DO NOT drive a car, operate machinery, make legal/financial decisions,   sign important papers or drink alcohol.    ACTIVITY:  Today: no heavy lifting, straining or running due to procedural   sedation/anesthesia.  The following day: return to full activity including work.  DIET:  Eat and drink normally unless instructed otherwise.     TREATMENT FOR COMMON SIDE EFFECTS:  - Mild abdominal pain, nausea, belching, bloating or excessive gas:  rest,   eat lightly and use a heating pad.  - Sore Throat: treat with throat lozenges and/or gargle with warm salt   water.  - Because air was used during the procedure, expelling large amounts of air   from your rectum or belching is normal.  - If a bowel prep was taken, you may not have a bowel movement for 1-3 days.    This is normal.  SYMPTOMS TO WATCH FOR AND REPORT TO YOUR PHYSICIAN:  1. Abdominal pain or bloating, other than  gas cramps.  2. Chest pain.  3. Back pain.  4. Signs of infection such as: chills or fever occurring within 24 hours   after the procedure.  5. Rectal bleeding, which would show as bright red, maroon, or black stools.   (A tablespoon of blood from the rectum is not serious, especially if   hemorrhoids are present.)  6. Vomiting.  7. Weakness or dizziness.  GO DIRECTLY TO THE NEAREST EMERGENCY ROOM IF YOU HAVE ANY OF THE FOLLOWING:      Difficulty breathing              Chills and/or fever over 101 F   Persistent vomiting and/or vomiting blood   Severe abdominal pain   Severe chest pain   Black, tarry stools   Bleeding- more than one tablespoon   Any other symptom or condition that you feel may need urgent attention  Your doctor recommends these additional instructions:  If any biopsies were taken, your doctors clinic will contact you in 1 to 2   weeks with any results.  - Discharge patient to home.   - Patient has a contact number available for emergencies.  The signs and   symptoms of potential delayed complications were discussed with the   patient.  Return to normal activities tomorrow.  Written discharge   instructions were provided to the patient.   - Resume previous diet.   - Continue present medications.   - Repeat upper endoscopy in 2 years for surveillance.   - Will discuss possible further workup of this suspected benign submucosal   esophageal lesion just below the UES , consider EUS procedure vs. closer   surveillance with EGD , i suspect this is benign but cannot confirm (does   not act like lipoma as no pillow sign)  For questions, problems or results please call your physician - Alec Gilbert MD.  EMERGENCY PHONE NUMBER: [phone],  LAB RESULTS: [phone]  IF A COMPLICATION OR EMERGENCY SITUATION ARISES AND YOU ARE UNABLE TO REACH   YOUR PHYSICIAN - GO DIRECTLY TO THE EMERGENCY ROOM.  Alec Gilbert MD  4/17/2025 12:15:18 PM  This report has been verified and signed  electronically.  Dear patient,  As a result of recent federal legislation (The Federal Cures Act), you may   receive lab or pathology results from your procedure in your [facility]   account before your physician is able to contact you. Your physician or   their representative will relay the results to you with their   recommendations at their soonest availability.  Thank you,  [employer]

## 2025-04-17 NOTE — TELEPHONE ENCOUNTER
Discussed post procedure findings    Small approx 8 mm submucosal nodule jsut below eus  She declined immediate scheduling of EUS  She wishes to proceed with EUS / with EGD in interval approx 1 year.   Reminder set  All questions answered

## 2025-04-17 NOTE — ANESTHESIA PREPROCEDURE EVALUATION
[facility]  Anesthesia Pre-Operative Evaluation         Patient Name: Vivienne Jose  YOB: 1950  MRN: [id number]    SUBJECTIVE:     Pre-operative evaluation for Procedure(s) (LRB):  EGD (ESOPHAGOGASTRODUODENOSCOPY) (N/A)     04/17/2025    Vivienne Jose is a 74 y.o. female w/ a significant PMHx of morbid obesity, Afib, CAD, HTN, BAM, T2DM, CKD3, DEL RIO, hx of varices  Patient now presents for the above procedure(s).    TTE:   The left ventricle is normal in size with normal systolic function.  The estimated ejection fraction is 60%.  Normal left ventricular diastolic function.  Mild mitral regurgitation.  Mild tricuspid regurgitation.  Normal right ventricular size with normal right ventricular systolic function.  There is no pulmonary hypertension.    Problem List[1]    Review of patient's allergies indicates:   Allergen Reactions    Sulfa (sulfonamide antibiotics) Nausea Only    Ciprofloxacin     Januvia [sitagliptin]      abd pain    Lisinopril     Lotensin [benazepril]     Nexium [esomeprazole magnesium] Other (See Comments)     Gas       Current Inpatient Medications:      Medications Ordered Prior to Encounter[2]    Past Surgical History:   Procedure Laterality Date    CARPAL TUNNEL RELEASE Right 2014    CATARACT EXTRACTION W/  INTRAOCULAR LENS IMPLANT Right 9/19/2023    Procedure: EXTRACTION, CATARACT, WITH IOL INSERTION;  Surgeon: Lyndon Ortiz MD;  Location: [facility] OR;  Service: Ophthalmology;  Laterality: Right;    CATARACT EXTRACTION W/  INTRAOCULAR LENS IMPLANT Left 10/3/2023    Procedure: EXTRACTION, CATARACT, WITH IOL INSERTION;  Surgeon: Lyndon Ortiz MD;  Location: [facility] OR;  Service: Ophthalmology;  Laterality: Left;    COLONOSCOPY      COLONOSCOPY N/A 9/13/2017    Procedure: COLONOSCOPY Golytely;  Surgeon: Gisela Wall MD;  Location: [facility];  Service: Endoscopy;  Laterality: N/A;    COLONOSCOPY N/A 9/9/2022    Procedure: COLONOSCOPY Extended  Suprep;  Surgeon: Britt Jasso MD;  Location: [facility] ENDO;  Service: Endoscopy;  Laterality: N/A;    CYST REMOVAL      skin; multiples    ESOPHAGOGASTRODUODENOSCOPY Left 10/15/2021    Procedure: EGD (ESOPHAGOGASTRODUODENOSCOPY);  Surgeon: Luis Fernando Taveras MD;  Location: [facility] (4TH FLR);  Service: Endoscopy;  Laterality: Left;  cirrhosis labwork am of procedure  last seizure 1973  COVID test on 10/12/21 at [facility]    ESOPHAGOGASTRODUODENOSCOPY N/A 3/10/2023    Procedure: EGD (ESOPHAGOGASTRODUODENOSCOPY);  Surgeon: Christa Caldera MD;  Location: [facility] (2ND FLR);  Service: Endoscopy;  Laterality: N/A;  Medically Urgent  cirrohsis-labs done on 2/9/23 (< 90 days)  New onset A-fib  3/1 instructions to portal-st  Precall complete- [place]    EXTRACORPOREAL SHOCK WAVE LITHOTRIPSY  2002    HYSTERECTOMY  1984    INJECTION, EPIDURAL, SPINE, CERVICOTHORACIC, TRANSFORAMINAL APPROACH Bilateral 1/28/2025    Procedure: b/l L1-l2 TFESI;  Surgeon: Myron Ocasio DO;  Location: [facility] PAIN [facility];  Service: Pain Management;  Laterality: Bilateral;  ASA 5 days Ozempic 7 days    JOINT REPLACEMENT Right 03/06/2019    knee    KIDNEY STONE SURGERY      KNEE ARTHROPLASTY Right 3/6/2019    Procedure: ARTHROPLASTY, KNEE;  Surgeon: Pj Gamboa MD;  Location: [facility] OR;  Service: Orthopedics;  Laterality: Right;  Depuy (Stephen notified 2/21, CC)    KYPHOPLASTY, SPINE, LUMBAR Bilateral 9/23/2024    Procedure: T12 and L1 SpineJack ([place]);  Surgeon: Myron Ocasio DO;  Location: [facility] OR;  Service: Pain Management;  Laterality: Bilateral;    THYROIDECTOMY  1977         TONSILLECTOMY, ADENOIDECTOMY      TRIGGER FINGER RELEASE      TUBAL LIGATION         OBJECTIVE:     Vital Signs Range (Last 24H):  Temp:  [36.6 °C (97.9 °F)]   Pulse:  [78]   Resp:  [18]   BP: (131)/(63)   SpO2:  [94 %]       Significant Labs:  Lab Results   Component Value Date    WBC 9.81 02/27/2025    HGB 11.9 (L) 02/27/2025    HCT 37.6  02/27/2025     02/27/2025    CHOL 130 03/08/2024    TRIG 102 03/08/2024    HDL 48 03/08/2024    ALT 17 02/27/2025    AST 31 02/27/2025     02/27/2025    K 4.7 02/27/2025     02/27/2025    CREATININE 1.3 02/27/2025    BUN 29 (H) 02/27/2025    CO2 26 02/27/2025    TSH 1.925 09/10/2024    INR 1.0 02/27/2025    HGBA1C 6.3 (H) 09/03/2024       Diagnostic Studies: No relevant studies.    EKG:   Results for orders placed or performed in visit on 12/01/22   EKG 12-lead    Collection Time: 12/01/22  8:44 AM    Narrative    Test Reason :     Vent. Rate : 046 BPM     Atrial Rate : 394 BPM     P-R Int : 000 ms          QRS Dur : 126 ms      QT Int : 468 ms       P-R-T Axes : 000 -36 021 degrees     QTc Int : 409 ms    Atrial fibrillation with slow ventricular response  Left axis deviation  Right bundle branch block  Abnormal ECG  When compared with ECG of 20-FEB-2019 13:24,  Atrial fibrillation has replaced Sinus rhythm  Vent. rate has decreased BY  37 BPM  QT has shortened  Confirmed by Kilo BEATTY, Chirag ZACARIAS ([id number]) on 12/5/2022 3:54:58 PM    Referred By:  phuc           Confirmed By:Chirag Boateng MD       ASSESSMENT/PLAN:           Pre-op Assessment    I have reviewed the Patient Summary Reports.     I have reviewed the Nursing Notes. I have reviewed the NPO Status.   I have reviewed the Medications.     Review of Systems  Anesthesia Hx:             Denies Family Hx of Anesthesia complications.    Denies Personal Hx of Anesthesia complications.                    Cardiovascular:     Hypertension   CAD                                          Pulmonary:        Sleep Apnea                Renal/:  Chronic Renal Disease, CKD                Hepatic/GI:     GERD Liver Disease, Hepatitis              Musculoskeletal:  Arthritis               Neurological:    Neuromuscular Disease,   Seizures                                Endocrine:  Diabetes Hypothyroidism        Morbid Obesity / BMI > 40      Physical  Exam  General: Cooperative, Alert and Oriented    Airway:  Mallampati: III   Mouth Opening: Normal  TM Distance: Normal  Tongue: Normal  Neck ROM: Normal ROM        Anesthesia Plan  Type of Anesthesia, risks & benefits discussed:    Anesthesia Type: Gen Natural Airway  Intra-op Monitoring Plan: Standard ASA Monitors  Post Op Pain Control Plan: multimodal analgesia  Induction:  IV  Informed Consent: Informed consent signed with the Patient and all parties understand the risks and agree with anesthesia plan.  All questions answered.   ASA Score: 3  Day of Surgery Review of History & Physical: H&P Update referred to the surgeon/provider.    Ready For Surgery From Anesthesia Perspective.     .           [1]   Patient Active Problem List  Diagnosis    Trigger middle finger of right hand    Kidney stones    Essential hypertension    CAD (coronary artery disease)    Chronic allergic rhinitis    Nuclear sclerosis - Both Eyes    Carpal tunnel syndrome of right wrist    Proteinuria    BAM (obstructive sleep apnea)    Sacroiliitis    Facet arthritis of lumbar region    Physical deconditioning    Preventive measure    Osteopenia    Hypertension associated with diabetes    Vitamin D deficiency    Hypocalcemia    Morbid obesity with body mass index (BMI) of 40.0 to 44.9 in adult    Dysuria    Recurrent HSV (herpes simplex virus)    Chronic pain of right knee    Gastroesophageal reflux disease    Pure hypercholesterolemia    Nephrolithiasis    Hypokalemia    Diabetic polyneuropathy associated with type 2 diabetes mellitus    Diverticulosis of intestine without bleeding    Skin nodule    Facet arthritis of cervical region    Bilateral carotid artery disease    Type 2 diabetes mellitus with stage 3a chronic kidney disease, with long-term current use of insulin    Interstitial cystitis    S/P total knee arthroplasty, right    Decreased range of motion of right knee    Muscle weakness of lower extremity    Mild nonproliferative  diabetic retinopathy of 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 edema associated with type 2 diabetes mellitus    Hypothyroidism    Thyroid nodule    Other cirrhosis of liver    History of recurrent UTI (urinary tract infection)    De Quervain's tenosynovitis, left    DEL RIO (nonalcoholic steatohepatitis)    Edema leg    Arrhythmia    Abdominal aortic atherosclerosis    Purpura    Nuclear sclerotic cataract of right eye    Nuclear sclerotic cataract of left eye    Decreased range of motion of trunk and back    Decreased strength of trunk and back    Poor posture    Secondary esophageal varices without bleeding    Disorder of arteries and arterioles, unspecified    Acute bilateral low back pain without sciatica    Abnormal x-ray of lumbar spine    Age related osteoporosis    Preoperative clearance    Osteoporosis   [2]   No current facility-administered medications on file prior to encounter.     Current Outpatient Medications on File Prior to Encounter   Medication Sig Dispense Refill    abaloparatide (TYMLOS) 80 mcg (3,120 mcg/1.56 mL) PnIj Inject 0.04 mLs (80 mcg total) into the skin once daily. 1.56 mL 11    aspirin (ECOTRIN) 81 MG EC tablet Take 81 mg by mouth once daily.      DULoxetine (CYMBALTA) 60 MG capsule Take 1 capsule (60 mg total) by mouth once daily. 30 capsule 11    furosemide (LASIX) 20 MG tablet Take 1 tablet (20 mg total) by mouth once daily. 30 tablet 11    glipiZIDE (GLUCOTROL) 5 MG tablet TAKE 1 TABLET TWICE DAILY BEFORE MEALS 180 tablet 3    levothyroxine (SYNTHROID) 75 MCG tablet Take 1 tablet (75 mcg total) by mouth once daily. 90 tablet 3    loratadine (CLARITIN) 10 mg tablet Take 10 mg by mouth once daily.      lovastatin (MEVACOR) 40 MG tablet TAKE 1 TABLET NIGHTLY (SUBSTITUTED FOR MEVACOR) 90 tablet 3    metFORMIN (GLUCOPHAGE) 1000 MG tablet TAKE 1 TABLET TWICE DAILY WITH MEALS 180 tablet 3    omeprazole (PRILOSEC) 40 MG capsule Take 1 capsule (40 mg total) by mouth 2 (two) times a day. 180 capsule 3     tiZANidine (ZANAFLEX) 4 MG tablet Take 1-2 tablets (4-8 mg total) by mouth nightly as needed (muscle cramps / back pain). 180 tablet 0    valACYclovir (VALTREX) 500 MG tablet Take 1 tablet (500 mg total) by mouth once daily. 90 tablet 3    valsartan (DIOVAN) 160 MG tablet Take 1 tablet (160 mg total) by mouth once daily. 90 tablet 1    ascorbic acid, vitamin C, (VITAMIN C) 100 MG tablet Take 500 mg by mouth 2 (two) times daily.       azelastine (ASTELIN) 137 mcg (0.1 %) nasal spray 2 sprays (274 mcg total) by Nasal route 2 (two) times daily. 120 mL 4    betamethasone dipropionate (DIPROLENE) 0.05 % ointment Apply topically 2 (two) times daily. Use to affected areas for up to 2 weeks then take a 1 week break or decrease to 3 times weekly. Do not apply to groin or face. Use to spot on ear when flaring 15 g 2    blood sugar diagnostic (TRUE METRIX GLUCOSE TEST STRIP) Strp TEST TWO TIMES DAILY 200 strip 6    blood-glucose meter Misc [employer] True Metrix Air meter 1 each 0    estradioL (ESTRING) 2 mg (7.5 mcg /24 hour) vaginal ring Remove old Estring, place the new one every 3 months. 1 each 3    fluticasone propionate (FLONASE) 50 mcg/actuation nasal spray 2 sprays (100 mcg total) by Each Nostril route once daily. 48 g 2    gabapentin (NEURONTIN) 600 MG tablet TAKE 1 TABLET THREE TIMES DAILY (Patient taking differently: Take 600 mg by mouth 3 (three) times daily.) 180 tablet 5    ipratropium (ATROVENT) 42 mcg (0.06 %) nasal spray 2 sprays by Each Nostril route 4 (four) times daily. 45 mL 4    ketotifen (ZADITOR) 0.025 % (0.035 %) ophthalmic solution Place 1-2 drops into both eyes once daily.      L.acid-B.bifidum-B.animal-FOS 25 billion cell -100 mg Cap Take 1 capsule by mouth once daily.      lactulose (CHRONULAC) 20 gram/30 mL Soln Take 30 mLs (20 g total) by mouth 2 (two) times daily as needed (titrate to have 2-3 bowl movements per day). (Patient not taking: Reported on 4/7/2025) 3000 mL 11    lancets (TRUEPLUS  "LANCETS) 28 gauge Misc Inject 1 lancet into the skin 2 (two) times daily before meals. 200 each 6    mv-mn/folic acid/vit [id number] (ALIVE ONCE DAILY WOMEN 50 PLUS ORAL) Take 1 tablet by mouth once daily.      pen needle, diabetic (BD ULTRA-FINE EDUARDO PEN NEEDLE) 32 gauge x 5/32" Ndle Inject 1 Needle into the skin once daily. 100 each 6    semaglutide (OZEMPIC) 1 mg/dose (4 mg/3 mL) Inject 1 mg into the skin every 7 days. 3 mL 11    spironolactone (ALDACTONE) 50 MG tablet Take 1 tablet (50 mg total) by mouth every other day. 15 tablet 11    TRESIBA FLEXTOUCH U-100 100 unit/mL (3 mL) insulin pen Inject 32 Units into the skin once daily. 30 mL 3    UNABLE TO FIND Take 1 tablet by mouth 2 (two) times a day. medication name: Magnesium 400mg, Calcium 1000 mg, D3 15mcg, Zinc 15mg       "

## 2025-04-17 NOTE — H&P
Short Stay Endoscopy History and Physical    PCP - Nalini Hall MD     Procedure - EGD  ASA - per anesthesia  Mallampati - per anesthesia  History of Anesthesia problems - no  Family history Anesthesia problems -  no   Plan of anesthesia - General    HPI:  This is a 74 y.o. female here for evaluation of :     varices screen      ROS:  Constitutional: No fevers, chills, No weight loss  CV: No chest pain  Pulm: No cough, No shortness of breath  Ophtho: No vision changes  GI: see HPI  Derm: No rash    Medical History:  has a past medical history of Allergy, Angio-edema, Arthritis, Cataract, Cerebrovascular malformation, Cirrhosis (11/24/2020), Colon polyps, Diabetes mellitus, type 2, Diabetic peripheral neuropathy, Edema of both feet (8/19/2022), GERD (gastroesophageal reflux disease), Herpes infection, Hypothyroidism, Kidney stones, Mild nonproliferative diabetic retinopathy of 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 edema associated with type 2 diabetes mellitus (4/18/2019), DEL RIO (nonalcoholic steatohepatitis) (8/19/2022), Nausea & vomiting (11/23/2015), Obesity, morbid, Ovarian cyst, Seizure disorder, focal motor, Sleep apnea, Type II or unspecified type diabetes mellitus with neurological manifestations, uncontrolled(250.62), and Urticaria.    Surgical History:  has a past surgical history that includes Thyroidectomy (1977); Carpal tunnel release (Right, 2014); Cyst Removal; Tubal ligation; Colonoscopy; TONSILLECTOMY, ADENOIDECTOMY; Extracorporeal shock wave lithotripsy (2002); Colonoscopy (N/A, 9/13/2017); Trigger finger release; Kidney stone surgery; Knee Arthroplasty (Right, 3/6/2019); Joint replacement (Right, 03/06/2019); Hysterectomy (1984); Esophagogastroduodenoscopy (Left, 10/15/2021); Colonoscopy (N/A, 9/9/2022); Esophagogastroduodenoscopy (N/A, 3/10/2023); Cataract extraction w/  intraocular lens implant (Right, 9/19/2023); Cataract extraction w/  intraocular lens implant (Left, 10/3/2023); kyphoplasty,  spine, lumbar (Bilateral, 9/23/2024); and injection, epidural, spine, cervicothoracic, transforaminal approach (Bilateral, 1/28/2025).    Family History: family history includes Allergic rhinitis in her son; Allergies in her son; Arthritis in her daughter and father; Cancer in her father; Dementia in her mother; Diabetes in her daughter; Glaucoma in her maternal aunt; Gout in her father; Hypertension in her daughter and mother; Leukemia in her maternal uncle; Macular degeneration in her mother; No Known Problems in her daughter; Skin cancer in her mother.. Otherwise no colon cancer, inflammatory bowel disease, or GI malignancies.    Social History:  reports that she has never smoked. She has never been exposed to tobacco smoke. She has never used smokeless tobacco. She reports that she does not drink alcohol and does not use drugs.    Review of patient's allergies indicates:   Allergen Reactions    Sulfa (sulfonamide antibiotics) Nausea Only    Ciprofloxacin     Januvia [sitagliptin]      abd pain    Lisinopril     Lotensin [benazepril]     Nexium [esomeprazole magnesium] Other (See Comments)     Gas       Medications:   Prescriptions Prior to Admission[1]    Physical Exam:    Vital Signs:   Vitals:    04/17/25 1101   BP: 131/63   Pulse: 78   Resp: 18   Temp: 97.9 °F (36.6 °C)       General Appearance: Well appearing in no acute distress  Eyes:    No scleral icterus  ENT: Neck supple, Lips, mucosa, and tongue normal; teeth and gums normal  Abdomen: Soft, non tender, non distended with normal bowel sounds. No hepatosplenomegaly, ascites, or mass.  Extremities: No edema  Skin: No rash    Labs:  Lab Results   Component Value Date    WBC 9.81 02/27/2025    HGB 11.9 (L) 02/27/2025    HCT 37.6 02/27/2025     02/27/2025    CHOL 130 03/08/2024    TRIG 102 03/08/2024    HDL 48 03/08/2024    ALT 17 02/27/2025    AST 31 02/27/2025     02/27/2025    K 4.7 02/27/2025     02/27/2025    CREATININE 1.3 02/27/2025     BUN 29 (H) 02/27/2025    CO2 26 02/27/2025    TSH 1.925 09/10/2024    INR 1.0 02/27/2025    HGBA1C 6.3 (H) 09/03/2024       I have explained the risks and benefits of endoscopy procedures to the patient including but not limited to bleeding, perforation, infection, and death.  The patient was asked if they understand and allowed to ask any further questions to their satisfaction.      Alec Gilbert MD         [1]   Facility-Administered Medications Prior to Admission   Medication Dose Route Frequency Provider Last Rate Last Admin    capsaicin-skin cleanser patch 4 patch  4 patch Topical (Top) 1 time in Clinic/HOD          Medications Prior to Admission   Medication Sig Dispense Refill Last Dose/Taking    abaloparatide (TYMLOS) 80 mcg (3,120 mcg/1.56 mL) PnIj Inject 0.04 mLs (80 mcg total) into the skin once daily. 1.56 mL 11 4/16/2025    aspirin (ECOTRIN) 81 MG EC tablet Take 81 mg by mouth once daily.   Past Week    DULoxetine (CYMBALTA) 60 MG capsule Take 1 capsule (60 mg total) by mouth once daily. 30 capsule 11 4/16/2025    furosemide (LASIX) 20 MG tablet Take 1 tablet (20 mg total) by mouth once daily. 30 tablet 11 4/16/2025    glipiZIDE (GLUCOTROL) 5 MG tablet TAKE 1 TABLET TWICE DAILY BEFORE MEALS 180 tablet 3 4/16/2025    levothyroxine (SYNTHROID) 75 MCG tablet Take 1 tablet (75 mcg total) by mouth once daily. 90 tablet 3 4/16/2025    loratadine (CLARITIN) 10 mg tablet Take 10 mg by mouth once daily.   4/16/2025    lovastatin (MEVACOR) 40 MG tablet TAKE 1 TABLET NIGHTLY (SUBSTITUTED FOR MEVACOR) 90 tablet 3 4/16/2025    metFORMIN (GLUCOPHAGE) 1000 MG tablet TAKE 1 TABLET TWICE DAILY WITH MEALS 180 tablet 3 4/16/2025    omeprazole (PRILOSEC) 40 MG capsule Take 1 capsule (40 mg total) by mouth 2 (two) times a day. 180 capsule 3 4/16/2025    tiZANidine (ZANAFLEX) 4 MG tablet Take 1-2 tablets (4-8 mg total) by mouth nightly as needed (muscle cramps / back pain). 180 tablet 0 Past Month    valACYclovir  (VALTREX) 500 MG tablet Take 1 tablet (500 mg total) by mouth once daily. 90 tablet 3 4/16/2025    valsartan (DIOVAN) 160 MG tablet Take 1 tablet (160 mg total) by mouth once daily. 90 tablet 1 4/16/2025    ascorbic acid, vitamin C, (VITAMIN C) 100 MG tablet Take 500 mg by mouth 2 (two) times daily.        azelastine (ASTELIN) 137 mcg (0.1 %) nasal spray 2 sprays (274 mcg total) by Nasal route 2 (two) times daily. 120 mL 4     betamethasone dipropionate (DIPROLENE) 0.05 % ointment Apply topically 2 (two) times daily. Use to affected areas for up to 2 weeks then take a 1 week break or decrease to 3 times weekly. Do not apply to groin or face. Use to spot on ear when flaring 15 g 2     blood sugar diagnostic (TRUE METRIX GLUCOSE TEST STRIP) Strp TEST TWO TIMES DAILY 200 strip 6     blood-glucose meter Misc [employer] True Metrix Air meter 1 each 0     estradioL (ESTRING) 2 mg (7.5 mcg /24 hour) vaginal ring Remove old Estring, place the new one every 3 months. 1 each 3     fluticasone propionate (FLONASE) 50 mcg/actuation nasal spray 2 sprays (100 mcg total) by Each Nostril route once daily. 48 g 2     gabapentin (NEURONTIN) 600 MG tablet TAKE 1 TABLET THREE TIMES DAILY (Patient taking differently: Take 600 mg by mouth 3 (three) times daily.) 180 tablet 5     guaiFENesin (MUCINEX) 600 mg 12 hr tablet Take 1,200 mg by mouth 2 (two) times daily.       ipratropium (ATROVENT) 42 mcg (0.06 %) nasal spray 2 sprays by Each Nostril route 4 (four) times daily. 45 mL 4     ketotifen (ZADITOR) 0.025 % (0.035 %) ophthalmic solution Place 1-2 drops into both eyes once daily.       L.acid-B.bifidum-B.animal-FOS 25 billion cell -100 mg Cap Take 1 capsule by mouth once daily.   Unknown    lactulose (CHRONULAC) 20 gram/30 mL Soln Take 30 mLs (20 g total) by mouth 2 (two) times daily as needed (titrate to have 2-3 bowl movements per day). (Patient not taking: Reported on 4/7/2025) 3000 mL 11     lancets (TRUEPLUS LANCETS) 28 gauge Misc  "Inject 1 lancet into the skin 2 (two) times daily before meals. 200 each 6     mv-mn/folic acid/vit [id number] (ALIVE ONCE DAILY WOMEN 50 PLUS ORAL) Take 1 tablet by mouth once daily.       pen needle, diabetic (BD ULTRA-FINE EDUARDO PEN NEEDLE) 32 gauge x 5/32" Ndle Inject 1 Needle into the skin once daily. 100 each 6     semaglutide (OZEMPIC) 1 mg/dose (4 mg/3 mL) Inject 1 mg into the skin every 7 days. 3 mL 11 4/1/2025    spironolactone (ALDACTONE) 50 MG tablet Take 1 tablet (50 mg total) by mouth every other day. 15 tablet 11     TRESIBA FLEXTOUCH U-100 100 unit/mL (3 mL) insulin pen Inject 32 Units into the skin once daily. 30 mL 3     UNABLE TO FIND Take 1 tablet by mouth 2 (two) times a day. medication name: Magnesium 400mg, Calcium 1000 mg, D3 15mcg, Zinc 15mg        "

## 2025-04-17 NOTE — TRANSFER OF CARE
"Anesthesia Transfer of Care Note    Patient: Vivienne Jose    Procedure(s) Performed: Procedure(s) (LRB):  EGD (ESOPHAGOGASTRODUODENOSCOPY) (N/A)    Patient location: GI    Anesthesia Type: general    Transport from OR: Transported from OR on room air with adequate spontaneous ventilation    Post pain: adequate analgesia    Post assessment: no apparent anesthetic complications and tolerated procedure well    Post vital signs: stable    Level of consciousness: awake, alert and oriented    Nausea/Vomiting: no nausea/vomiting    Complications: none    Transfer of care protocol was followed      Last vitals: Visit Vitals  /63   Pulse 78   Temp 36.6 °C (97.9 °F)   Resp 18   Ht 5' 2" (1.575 m)   Wt 104.3 kg (230 lb)   LMP  (LMP Unknown)   SpO2 (!) 94%   Breastfeeding No   BMI 42.07 kg/m²     "

## 2025-04-19 NOTE — ANESTHESIA POSTPROCEDURE EVALUATION
Anesthesia Post Evaluation    Patient: Vivienne Jose    Procedure(s) Performed: Procedure(s) (LRB):  EGD (ESOPHAGOGASTRODUODENOSCOPY) (N/A)    Final Anesthesia Type: general      Patient location during evaluation: GI PACU  Patient participation: Yes- Able to Participate  Level of consciousness: awake and alert, oriented and awake  Post-procedure vital signs: reviewed and stable  Pain management: adequate  Airway patency: patent  BAM mitigation strategies: Multimodal analgesia  PONV status at discharge: No PONV  Anesthetic complications: no      Cardiovascular status: blood pressure returned to baseline and hemodynamically stable  Respiratory status: unassisted and spontaneous ventilation  Hydration status: euvolemic  Follow-up not needed.              Vitals Value Taken Time   /64 04/17/25 12:40   Temp  04/19/25 07:30   Pulse 72 04/17/25 12:40   Resp 12 04/17/25 12:40   SpO2 96 % 04/17/25 12:40         Event Time   Out of Recovery 12:57:47         Pain/Gordo Score: No data recorded

## 2025-04-22 ENCOUNTER — OFFICE VISIT (OUTPATIENT)
Dept: PRIMARY CARE CLINIC | Facility: CLINIC | Age: 75
End: 2025-04-22
Payer: MEDICARE

## 2025-04-22 ENCOUNTER — LAB VISIT (OUTPATIENT)
Dept: LAB | Facility: HOSPITAL | Age: 75
End: 2025-04-22
Attending: STUDENT IN AN ORGANIZED HEALTH CARE EDUCATION/TRAINING PROGRAM
Payer: MEDICARE

## 2025-04-22 ENCOUNTER — TELEPHONE (OUTPATIENT)
Dept: PRIMARY CARE CLINIC | Facility: CLINIC | Age: 75
End: 2025-04-22

## 2025-04-22 ENCOUNTER — RESULTS FOLLOW-UP (OUTPATIENT)
Dept: PRIMARY CARE CLINIC | Facility: CLINIC | Age: 75
End: 2025-04-22

## 2025-04-22 VITALS
OXYGEN SATURATION: 97 % | DIASTOLIC BLOOD PRESSURE: 69 MMHG | SYSTOLIC BLOOD PRESSURE: 116 MMHG | BODY MASS INDEX: 42.88 KG/M2 | HEART RATE: 84 BPM | WEIGHT: 234.44 LBS

## 2025-04-22 DIAGNOSIS — I10 ESSENTIAL HYPERTENSION: ICD-10-CM

## 2025-04-22 DIAGNOSIS — E03.9 HYPOTHYROIDISM, UNSPECIFIED TYPE: ICD-10-CM

## 2025-04-22 DIAGNOSIS — E11.49 TYPE 2 DIABETES MELLITUS WITH OTHER NEUROLOGIC COMPLICATION, WITH LONG-TERM CURRENT USE OF INSULIN: ICD-10-CM

## 2025-04-22 DIAGNOSIS — K21.9 GASTROESOPHAGEAL REFLUX DISEASE WITHOUT ESOPHAGITIS: ICD-10-CM

## 2025-04-22 DIAGNOSIS — Z79.4 TYPE 2 DIABETES MELLITUS WITH OTHER NEUROLOGIC COMPLICATION, WITH LONG-TERM CURRENT USE OF INSULIN: ICD-10-CM

## 2025-04-22 DIAGNOSIS — Z79.4 TYPE 2 DIABETES MELLITUS WITH STAGE 3A CHRONIC KIDNEY DISEASE, WITH LONG-TERM CURRENT USE OF INSULIN: ICD-10-CM

## 2025-04-22 DIAGNOSIS — K75.81 NASH (NONALCOHOLIC STEATOHEPATITIS): ICD-10-CM

## 2025-04-22 DIAGNOSIS — M81.0 AGE RELATED OSTEOPOROSIS, UNSPECIFIED PATHOLOGICAL FRACTURE PRESENCE: ICD-10-CM

## 2025-04-22 DIAGNOSIS — E11.22 TYPE 2 DIABETES MELLITUS WITH STAGE 3A CHRONIC KIDNEY DISEASE, WITH LONG-TERM CURRENT USE OF INSULIN: ICD-10-CM

## 2025-04-22 DIAGNOSIS — E66.01 MORBID OBESITY WITH BODY MASS INDEX (BMI) OF 40.0 TO 44.9 IN ADULT: Primary | ICD-10-CM

## 2025-04-22 DIAGNOSIS — N18.31 TYPE 2 DIABETES MELLITUS WITH STAGE 3A CHRONIC KIDNEY DISEASE, WITH LONG-TERM CURRENT USE OF INSULIN: ICD-10-CM

## 2025-04-22 LAB
ALBUMIN/CREAT UR: NORMAL
CHOLEST SERPL-MCNC: 151 MG/DL (ref 120–199)
CHOLEST/HDLC SERPL: 2.9 {RATIO} (ref 2–5)
CREAT UR-MCNC: 22 MG/DL (ref 15–325)
EAG (OHS): 166 MG/DL (ref 68–131)
HBA1C MFR BLD: 7.4 % (ref 4–5.6)
HDLC SERPL-MCNC: 52 MG/DL (ref 40–75)
HDLC SERPL: 34.4 % (ref 20–50)
LDLC SERPL CALC-MCNC: 73.8 MG/DL (ref 63–159)
MICROALBUMIN UR-MCNC: <5 UG/ML (ref ?–5000)
NONHDLC SERPL-MCNC: 99 MG/DL
TRIGL SERPL-MCNC: 126 MG/DL (ref 30–150)
TSH SERPL-ACNC: 0.77 UIU/ML (ref 0.4–4)

## 2025-04-22 PROCEDURE — 36415 COLL VENOUS BLD VENIPUNCTURE: CPT

## 2025-04-22 PROCEDURE — 84443 ASSAY THYROID STIM HORMONE: CPT

## 2025-04-22 PROCEDURE — 80061 LIPID PANEL: CPT

## 2025-04-22 PROCEDURE — 82043 UR ALBUMIN QUANTITATIVE: CPT

## 2025-04-22 PROCEDURE — 99999 PR PBB SHADOW E&M-EST. PATIENT-LVL V: CPT | Mod: PBBFAC,,, | Performed by: STUDENT IN AN ORGANIZED HEALTH CARE EDUCATION/TRAINING PROGRAM

## 2025-04-22 PROCEDURE — 83036 HEMOGLOBIN GLYCOSYLATED A1C: CPT

## 2025-04-22 NOTE — ASSESSMENT & PLAN NOTE
Follows with hepatology. Lfts have been wnl. Continue to follow. Had egd to rule out varices, did not have any.

## 2025-04-22 NOTE — ASSESSMENT & PLAN NOTE
>>ASSESSMENT AND PLAN FOR ESSENTIAL HYPERTENSION WRITTEN ON 4/22/2025 11:04 AM BY SAMIA SAM MD    on valsartan 160 mg daily and aldactone 50 mg every other day. She is on digital medicine. will monitor. continue

## 2025-04-22 NOTE — ASSESSMENT & PLAN NOTE
Last A1c of 6.3. well controlled on on metformin 1000 mg bid, ozempic 0.5 mg weekly, glipizide 5 mg bid, tresiba 32 U daily. No side effects from ozempic. I increased ozempic to 1 mg last visit but pt did not start yet. Asked pt to cut back on tresiba to 25 units when she increases ozempic dose. Can tirate up or lower if needed. Being monitored by digital medicine. Will schedule A1c today and urine

## 2025-04-22 NOTE — PROGRESS NOTES
Clinic Note  4/22/2025      Subjective:       Patient ID:  Vivienne is a 74 y.o. female being seen for an established visit.    Chief Complaint: Hypertension and Diabetes    HPI  Vivienne Weaver is a 74 y.o.  female who presents with HTN, type 2 DM, HLD, DEL RIO, hypothyroidism (s/p thyroidectomy due to cysts), nephrolithiasis, recurrent UTIs (sees dr. Cee), sleep apnea, neuropathy is here for a f/u visit. Did acp 2025  -saw urology. US showed BL renal cysts so urology ordered a CT. Renal cyst noted. No abnormal enhancement.   -gained a few pounds since last time. Eats honey buns, cookies, cokes. She will focus on avoiding sugars. Discussed many recipes for her to try.  -seeing pain medicine for her back pain. Had ELANA earlier this year.   -had an EGD April 17th. They saw a small 8 mm submucosal nodule. She did not want an EUS right away. She wants to do it with her next egd in 1 yr. They dilated the lower third of the esophagus.   -did not do the labs I ordered. Need lipid panel, tsh and A1c and urine microalbumin. Will schedule today   -not checking BS at home    Advance Care Planning     Date: 04/22/2025    Power of   I initiated the process of voluntary advance care planning today and explained the importance of this process to the patient.  I introduced the concept of advance directives to the patient, as well. Then the patient received detailed information about the importance of designating a Health Care Power of  (HCPOA). She was also instructed to communicate with this person about their wishes for future healthcare, should she become sick and lose decision-making capacity. The patient has previously appointed a HCPOA. After our discussion, the patient has decided to complete a HCPOA and has appointed her significant other, health care agent: Tucker weaver & health care agent number: [phone]. I encouraged her to communicate with this person about their wishes for future healthcare,  should she become sick and lose decision-making capacity.      A total of 5 min was spent on advance care planning, goals of care discussion, emotional support, formulating and communicating prognosis and exploring burden/benefit of various approaches of treatment. This discussion occurred on a fully voluntary basis with the verbal consent of the patient and/or family.          Review of Systems   Constitutional:  Negative for chills and fever.   HENT:  Negative for congestion.    Respiratory:  Negative for cough and shortness of breath.    Cardiovascular:  Negative for chest pain.   Gastrointestinal:  Negative for abdominal pain, blood in stool, constipation and diarrhea.   Genitourinary:  Negative for dysuria and hematuria.   Neurological:  Negative for dizziness and headaches.       Medication List with Changes/Refills   Current Medications    ABALOPARATIDE (TYMLOS) 80 MCG (3,120 MCG/1.56 ML) PNIJ    Inject 0.04 mLs (80 mcg total) into the skin once daily.    ASCORBIC ACID, VITAMIN C, (VITAMIN C) 100 MG TABLET    Take 500 mg by mouth 2 (two) times daily.     ASPIRIN (ECOTRIN) 81 MG EC TABLET    Take 81 mg by mouth once daily.    AZELASTINE (ASTELIN) 137 MCG (0.1 %) NASAL SPRAY    2 sprays (274 mcg total) by Nasal route 2 (two) times daily.    BETAMETHASONE DIPROPIONATE (DIPROLENE) 0.05 % OINTMENT    Apply topically 2 (two) times daily. Use to affected areas for up to 2 weeks then take a 1 week break or decrease to 3 times weekly. Do not apply to groin or face. Use to spot on ear when flaring    BLOOD SUGAR DIAGNOSTIC (TRUE METRIX GLUCOSE TEST STRIP) STRP    TEST TWO TIMES DAILY    BLOOD-GLUCOSE METER MISC    Humana True Metrix Air meter    DULOXETINE (CYMBALTA) 60 MG CAPSULE    Take 1 capsule (60 mg total) by mouth once daily.    ESTRADIOL (ESTRING) 2 MG (7.5 MCG /24 HOUR) VAGINAL RING    Remove old Estring, place the new one every 3 months.    FLUTICASONE PROPIONATE (FLONASE) 50 MCG/ACTUATION NASAL SPRAY    2  "sprays (100 mcg total) by Each Nostril route once daily.    FUROSEMIDE (LASIX) 20 MG TABLET    Take 1 tablet (20 mg total) by mouth once daily.    GABAPENTIN (NEURONTIN) 600 MG TABLET    TAKE 1 TABLET THREE TIMES DAILY    GLIPIZIDE (GLUCOTROL) 5 MG TABLET    TAKE 1 TABLET TWICE DAILY BEFORE MEALS    GUAIFENESIN (MUCINEX) 600 MG 12 HR TABLET    Take 1,200 mg by mouth 2 (two) times daily.    IPRATROPIUM (ATROVENT) 42 MCG (0.06 %) NASAL SPRAY    2 sprays by Each Nostril route 4 (four) times daily.    KETOTIFEN (ZADITOR) 0.025 % (0.035 %) OPHTHALMIC SOLUTION    Place 1-2 drops into both eyes once daily.    L.ACID-B.BIFIDUM-B.ANIMAL-FOS 25 BILLION CELL -100 MG CAP    Take 1 capsule by mouth once daily.    LACTULOSE (CHRONULAC) 20 GRAM/30 ML SOLN    Take 30 mLs (20 g total) by mouth 2 (two) times daily as needed (titrate to have 2-3 bowl movements per day).    LANCETS (TRUEPLUS LANCETS) 28 GAUGE MISC    Inject 1 lancet into the skin 2 (two) times daily before meals.    LEVOTHYROXINE (SYNTHROID) 75 MCG TABLET    Take 1 tablet (75 mcg total) by mouth once daily.    LORATADINE (CLARITIN) 10 MG TABLET    Take 10 mg by mouth once daily.    LOVASTATIN (MEVACOR) 40 MG TABLET    TAKE 1 TABLET NIGHTLY (SUBSTITUTED FOR MEVACOR)    METFORMIN (GLUCOPHAGE) 1000 MG TABLET    TAKE 1 TABLET TWICE DAILY WITH MEALS    MV-MN/FOLIC ACID/VIT [id number] (ALIVE ONCE DAILY WOMEN 50 PLUS ORAL)    Take 1 tablet by mouth once daily.    OMEPRAZOLE (PRILOSEC) 40 MG CAPSULE    Take 1 capsule (40 mg total) by mouth 2 (two) times a day.    PEN NEEDLE, DIABETIC (BD ULTRA-FINE EDUARDO PEN NEEDLE) 32 GAUGE X 5/32" NDLE    Inject 1 Needle into the skin once daily.    SEMAGLUTIDE (OZEMPIC) 1 MG/DOSE (4 MG/3 ML)    Inject 1 mg into the skin every 7 days.    SPIRONOLACTONE (ALDACTONE) 50 MG TABLET    Take 1 tablet (50 mg total) by mouth every other day.    TIZANIDINE (ZANAFLEX) 4 MG TABLET    Take 1-2 tablets (4-8 mg total) by mouth nightly as needed (muscle " "cramps / back pain).    TRESIBA FLEXTOUCH U-100 100 UNIT/ML (3 ML) INSULIN PEN    Inject 32 Units into the skin once daily.    UNABLE TO FIND    Take 1 tablet by mouth 2 (two) times a day. medication name: Magnesium 400mg, Calcium 1000 mg, D3 15mcg, Zinc 15mg    VALACYCLOVIR (VALTREX) 500 MG TABLET    Take 1 tablet (500 mg total) by mouth once daily.    VALSARTAN (DIOVAN) 160 MG TABLET    Take 1 tablet (160 mg total) by mouth once daily.           Objective:      /69 (BP Location: Left arm, Patient Position: Sitting)   Pulse 84   Wt 106.4 kg (234 lb 7.4 oz)   LMP  (LMP Unknown)   SpO2 97%   BMI 42.88 kg/m²   Estimated body mass index is 42.88 kg/m² as calculated from the following:    Height as of 4/17/25: 5' 2" (1.575 m).    Weight as of this encounter: 106.4 kg (234 lb 7.4 oz).  Physical Exam  Constitutional:       Appearance: Normal appearance.   Eyes:      Conjunctiva/sclera: Conjunctivae normal.   Neck:      Vascular: No carotid bruit.   Cardiovascular:      Rate and Rhythm: Normal rate and regular rhythm.      Heart sounds: Normal heart sounds.   Pulmonary:      Effort: Pulmonary effort is normal. No respiratory distress.      Breath sounds: Normal breath sounds.   Abdominal:      General: Bowel sounds are normal.   Musculoskeletal:      Right lower leg: No edema.      Left lower leg: No edema.   Skin:     General: Skin is warm.      Findings: Bruising present.   Neurological:      Mental Status: She is alert and oriented to person, place, and time.   Psychiatric:         Mood and Affect: Mood normal.         Behavior: Behavior normal.           Assessment and Plan:   1. Morbid obesity with body mass index (BMI) of 40.0 to 44.9 in adult  Assessment & Plan:  gained a few pounds since last time. Eats honey buns, cookies, cokes. She will focus on avoiding sugars. Discussed many recipes for her to try. Will monitor      2. DEL RIO (nonalcoholic steatohepatitis)  Assessment & Plan:  Follows with hepatology. " Lfts have been wnl. Continue to follow. Had egd to rule out varices, did not have any.       3. Gastroesophageal reflux disease without esophagitis  Assessment & Plan:  Had recent EGD. No specimens obtained. Was dilated. On omeprazole 40 bid per hepatology.       4. Essential hypertension  Assessment & Plan:  on valsartan 160 mg daily and aldactone 50 mg every other day. She is on digital medicine. will monitor. continue      5. Type 2 diabetes mellitus with stage 3a chronic kidney disease, with long-term current use of insulin  Assessment & Plan:  Last A1c of 6.3. well controlled on on metformin 1000 mg bid, ozempic 0.5 mg weekly, glipizide 5 mg bid, tresiba 32 U daily. No side effects from ozempic. I increased ozempic to 1 mg last visit but pt did not start yet. Asked pt to cut back on tresiba to 25 units when she increases ozempic dose. Can tirate up or lower if needed. Being monitored by digital medicine. Will schedule A1c today and urine      6. Age related osteoporosis, unspecified pathological fracture presence  Assessment & Plan:  She is on tymlos. Due for dexa after June 12th. Ordered it today    Orders:  -     Cancel: DXA Bone Density Axial Skeleton 1 or more sites; Future; Expected date: 04/22/2025  -     DXA Bone Density Axial Skeleton 1 or more sites; Future; Expected date: 06/12/2025                Follow Up:   Follow up in about 3 months (around 7/22/2025).        Nalini Hall

## 2025-04-22 NOTE — TELEPHONE ENCOUNTER
Call to patient to follow-up with lab results. No answer, LVM requesting call back.    Medication:   Requested Prescriptions     Pending Prescriptions Disp Refills    lovastatin (MEVACOR) 40 MG tablet [Pharmacy Med Name: Lovastatin 40 MG Oral Tablet] 90 tablet 3     Sig: TAKE 1 TABLET BY MOUTH AT  NIGHT       Last Filled:      Patient Phone Number: [phone] (home)     Last appt: 8/18/2022   Next appt: Visit date not found    Last Lipid:   Lab Results   Component Value Date/Time    CHOL 151 08/22/2022 09:14 AM    TRIG 97 08/22/2022 09:14 AM    HDL 45 08/22/2022 09:14 AM    LDLCALC 87 08/22/2022 09:14 AM

## 2025-04-22 NOTE — ASSESSMENT & PLAN NOTE
gained a few pounds since last time. Eats honey buns, cookies, cokes. She will focus on avoiding sugars. Discussed many recipes for her to try. Will monitor

## 2025-04-22 NOTE — TELEPHONE ENCOUNTER
----- Message from Nalini Hall MD sent at 4/22/2025  5:01 PM CDT -----  Pls let patient know that her A1c is 7.4, not too bad but up from last time. Other labs are stable.   ----- Message -----  From: Lab, Background User  Sent: 4/22/2025   4:22 PM CDT  To: Nalini Hall MD

## 2025-04-23 NOTE — TELEPHONE ENCOUNTER
Call received from patient. Discussed lab reports A1c elevated at 7.4, other labs stable.    Patient reports not sticking to diet, eating sweets and too many. Patient does not exercise. Discussed setting goals one day at a time for no sweets in diet. Also discussed HC at clinic and patient agreed for referral to talk to HC regarding diet and exercise.

## 2025-04-24 ENCOUNTER — OFFICE VISIT (OUTPATIENT)
Dept: FAMILY MEDICINE | Facility: CLINIC | Age: 75
End: 2025-04-24
Payer: MEDICARE

## 2025-04-24 VITALS
DIASTOLIC BLOOD PRESSURE: 52 MMHG | OXYGEN SATURATION: 97 % | SYSTOLIC BLOOD PRESSURE: 112 MMHG | WEIGHT: 233.13 LBS | BODY MASS INDEX: 42.63 KG/M2 | HEART RATE: 74 BPM

## 2025-04-24 DIAGNOSIS — Z74.09 OTHER REDUCED MOBILITY: ICD-10-CM

## 2025-04-24 DIAGNOSIS — E11.59 HYPERTENSION ASSOCIATED WITH DIABETES: ICD-10-CM

## 2025-04-24 DIAGNOSIS — E11.22 TYPE 2 DIABETES MELLITUS WITH STAGE 3B CHRONIC KIDNEY DISEASE, WITH LONG-TERM CURRENT USE OF INSULIN: ICD-10-CM

## 2025-04-24 DIAGNOSIS — E03.9 HYPOTHYROIDISM, UNSPECIFIED TYPE: ICD-10-CM

## 2025-04-24 DIAGNOSIS — D69.2 PURPURA: ICD-10-CM

## 2025-04-24 DIAGNOSIS — I85.10 SECONDARY ESOPHAGEAL VARICES WITHOUT BLEEDING: ICD-10-CM

## 2025-04-24 DIAGNOSIS — M46.1 SACROILIITIS: ICD-10-CM

## 2025-04-24 DIAGNOSIS — E11.3293 MILD NONPROLIFERATIVE DIABETIC RETINOPATHY OF BOTH EYES WITHOUT MACULAR EDEMA ASSOCIATED WITH TYPE 2 DIABETES MELLITUS: ICD-10-CM

## 2025-04-24 DIAGNOSIS — I70.0 ABDOMINAL AORTIC ATHEROSCLEROSIS: ICD-10-CM

## 2025-04-24 DIAGNOSIS — K74.69 OTHER CIRRHOSIS OF LIVER: ICD-10-CM

## 2025-04-24 DIAGNOSIS — E11.42 DIABETIC POLYNEUROPATHY ASSOCIATED WITH TYPE 2 DIABETES MELLITUS: ICD-10-CM

## 2025-04-24 DIAGNOSIS — Z00.00 ENCOUNTER FOR MEDICARE ANNUAL WELLNESS EXAM: Primary | ICD-10-CM

## 2025-04-24 DIAGNOSIS — N18.32 TYPE 2 DIABETES MELLITUS WITH STAGE 3B CHRONIC KIDNEY DISEASE, WITH LONG-TERM CURRENT USE OF INSULIN: ICD-10-CM

## 2025-04-24 DIAGNOSIS — Z79.4 TYPE 2 DIABETES MELLITUS WITH STAGE 3B CHRONIC KIDNEY DISEASE, WITH LONG-TERM CURRENT USE OF INSULIN: ICD-10-CM

## 2025-04-24 DIAGNOSIS — E66.01 MORBID OBESITY WITH BODY MASS INDEX (BMI) OF 40.0 TO 44.9 IN ADULT: ICD-10-CM

## 2025-04-24 DIAGNOSIS — I15.2 HYPERTENSION ASSOCIATED WITH DIABETES: ICD-10-CM

## 2025-04-24 PROBLEM — Z01.818 PREOPERATIVE CLEARANCE: Status: RESOLVED | Noted: 2024-09-05 | Resolved: 2025-04-24

## 2025-04-24 PROCEDURE — 99999 PR PBB SHADOW E&M-EST. PATIENT-LVL V: CPT | Mod: PBBFAC,HCNC,, | Performed by: NURSE PRACTITIONER

## 2025-04-24 NOTE — PROGRESS NOTES
Vivienne Jose presented for a  Medicare AWV and comprehensive Health Risk Assessment today. The following components were reviewed and updated:    Medical history  Family History  Social history  Allergies and Current Medications  Health Risk Assessment  Health Maintenance  Care Team         ** See Completed Assessments for Annual Wellness Visit within the encounter summary.**         The following assessments were completed:  Living Situation  CAGE  Depression Screening  Timed Get Up and Go  Whisper Test  Cognitive Function Screening      Nutrition Screening  ADL Screening  PAQ Screening      Opioid documentation:      Patient does not have a current opioid prescription.        Vitals:    04/24/25 1307   BP: (!) 112/52   BP Location: Left arm   Patient Position: Sitting   Pulse: 74   SpO2: 97%   Weight: 105.7 kg (233 lb 1.6 oz)     Body mass index is 42.63 kg/m².    Physical Exam  Vitals reviewed.   Constitutional:       General: She is awake. She is not in acute distress.     Appearance: Normal appearance. She is well-developed and well-groomed. She is morbidly obese.   HENT:      Head: Normocephalic.   Eyes:      General:         Right eye: No discharge.         Left eye: No discharge.      Comments: Wearing glasses   Cardiovascular:      Rate and Rhythm: Normal rate.   Pulmonary:      Effort: Pulmonary effort is normal. No respiratory distress.   Skin:     General: Skin is dry.      Coloration: Skin is not pale.      Comments: Purpura noted, see attached photo   Neurological:      Mental Status: She is alert and oriented to person, place, and time.      Coordination: Coordination normal.   Psychiatric:         Attention and Perception: Attention normal.         Mood and Affect: Mood and affect normal.         Speech: Speech normal.         Behavior: Behavior normal. Behavior is cooperative.         Thought Content: Thought content normal.             Diagnoses and health risks identified today and associated  recommendations/orders:    1. Encounter for Medicare annual wellness exam    2. Secondary esophageal varices without bleeding  Chronic; stable. Follow up with PCP.    3. Sacroiliitis  Chronic; stable using Cymbalta and gabapentin with PRN tizanidine. Follow up with PCP.    4. Other cirrhosis of liver  Chronic; stable. Followed by Hepatology.    5. Type 2 diabetes mellitus with stage 3b chronic kidney disease, with long-term current use of insulin  Chronic; stable on metformin, weekly Ozempic, insulin, and glipizide medication. Follow up with PCP.    6. Hypertension associated with diabetes  Chronic; stable on Lasix, Aldactone, and valsartan medication. Follow up with PCP.    7. Diabetic polyneuropathy associated with type 2 diabetes mellitus  Chronic; stable on gabapentin medication. Follow up with PCP.    8. Mild nonproliferative diabetic retinopathy of 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 edema associated with type 2 diabetes mellitus  Chronic; stable. Followed by Optometry.    9. Abdominal aortic atherosclerosis  Chronic; stable on lovastatin medication. Follow up with PCP.    10. Purpura  As seen on attached photo; follow up with PCP.    11. Hypothyroidism, unspecified type  Chronic; stable on levothyroxine medication. Follow up with PCP.    12. Other reduced mobility  Ambulates independently; slightly slowed but stable gait. Follow up with PCP.    13. Morbid obesity with body mass index (BMI) of 40.0 to 44.9 in adult  Eat a low salt/low fat ADA diet and discussed importance of engaging in physical activity at least 5x/week for a minimum of 30 min/day.        Provided Vivienne with a 5-10 year written screening schedule and personal prevention plan. Recommendations were developed using the USPSTF age appropriate recommendations. Education, counseling, and referrals were provided as needed. After Visit Summary given to patient which includes a list of additional screenings/tests needed.    Follow up for your next annual  wellness visit.    Leta Adair, NP      I offered to discuss advanced care planning, including how to pick a person who would make decisions for you if you were unable to make them for yourself, called a health care power of , and what kind of decisions you might make such as use of life sustaining treatments such as ventilators and tube feeding when faced with a life limiting illness recorded on a living will that they will need to know. (How you want to be cared for as you near the end of your natural life)     X  Patient has advanced directives on file, which we reviewed, and they do not wish to make changes.

## 2025-04-24 NOTE — PATIENT INSTRUCTIONS
Counseling and Referral of Other Preventative  (Italic type indicates deductible and co-insurance are waived)    Patient Name: Vivienne Jose  Today's Date: 4/24/2025    Health Maintenance       Date Due Completion Date    RSV Vaccine (Age 60+ and Pregnant patients) (1 - Risk 60-74 years 1-dose series) Never done ---    TETANUS VACCINE 06/19/2024 6/19/2014    COVID-19 Vaccine (5 - 2024-25 season) 09/01/2024 8/15/2022    Foot Exam 04/09/2025 4/9/2024    Override on 1/17/2023: Done    Override on 8/2/2017: Done    Override on 4/28/2015: Done    DEXA Scan 06/12/2025 6/12/2023    Diabetic Eye Exam 05/27/2025 (Originally 5/24/2025) 5/24/2024    Hemoglobin A1c 10/22/2025 4/22/2025    Mammogram 10/28/2025 10/28/2024    Diabetes Urine Screening 04/22/2026 4/22/2025    Lipid Panel 04/22/2026 4/22/2025    Colorectal Cancer Screening 09/09/2027 9/9/2022        No orders of the defined types were placed in this encounter.    The following information is provided to all patients.  This information is to help you find resources for any of the problems found today that may be affecting your health:                  Living healthy guide: www.[facility].[place].gov      Understanding Diabetes: www.diabetes.org      Eating healthy: www.cdc.gov/healthyweight      CDC home safety checklist: www.cdc.gov/steadi/patient.html      Agency on Aging: www.goea.[place].[facility]      Alcoholics anonymous (AA): www.aa.org      Physical Activity: www.leno.nih.gov/la2xhte      Tobacco use: www.quitwithusla.org

## 2025-04-30 DIAGNOSIS — R60.9 EDEMA, UNSPECIFIED TYPE: ICD-10-CM

## 2025-04-30 RX ORDER — SPIRONOLACTONE 50 MG/1
50 TABLET, FILM COATED ORAL EVERY OTHER DAY
Qty: 45 TABLET | Refills: 3 | Status: SHIPPED | OUTPATIENT
Start: 2025-04-30

## 2025-05-06 ENCOUNTER — OFFICE VISIT (OUTPATIENT)
Dept: DERMATOLOGY | Facility: CLINIC | Age: 75
End: 2025-05-06
Payer: MEDICARE

## 2025-05-06 DIAGNOSIS — D18.01 CHERRY ANGIOMA: ICD-10-CM

## 2025-05-06 DIAGNOSIS — Z12.83 SCREENING EXAM FOR SKIN CANCER: ICD-10-CM

## 2025-05-06 DIAGNOSIS — L82.0 SEBORRHEIC KERATOSES, INFLAMED: Primary | ICD-10-CM

## 2025-05-06 DIAGNOSIS — L57.0 ACTINIC KERATOSIS: ICD-10-CM

## 2025-05-06 DIAGNOSIS — L82.1 SEBORRHEIC KERATOSES: ICD-10-CM

## 2025-05-06 DIAGNOSIS — D22.9 MULTIPLE BENIGN NEVI: ICD-10-CM

## 2025-05-06 PROCEDURE — 1157F ADVNC CARE PLAN IN RCRD: CPT | Mod: CPTII,HCNC,S$GLB, | Performed by: STUDENT IN AN ORGANIZED HEALTH CARE EDUCATION/TRAINING PROGRAM

## 2025-05-06 PROCEDURE — 1160F RVW MEDS BY RX/DR IN RCRD: CPT | Mod: CPTII,HCNC,S$GLB, | Performed by: STUDENT IN AN ORGANIZED HEALTH CARE EDUCATION/TRAINING PROGRAM

## 2025-05-06 PROCEDURE — 3066F NEPHROPATHY DOC TX: CPT | Mod: CPTII,HCNC,S$GLB, | Performed by: STUDENT IN AN ORGANIZED HEALTH CARE EDUCATION/TRAINING PROGRAM

## 2025-05-06 PROCEDURE — 1126F AMNT PAIN NOTED NONE PRSNT: CPT | Mod: CPTII,HCNC,S$GLB, | Performed by: STUDENT IN AN ORGANIZED HEALTH CARE EDUCATION/TRAINING PROGRAM

## 2025-05-06 PROCEDURE — 99213 OFFICE O/P EST LOW 20 MIN: CPT | Mod: 25,HCNC,S$GLB, | Performed by: STUDENT IN AN ORGANIZED HEALTH CARE EDUCATION/TRAINING PROGRAM

## 2025-05-06 PROCEDURE — 3288F FALL RISK ASSESSMENT DOCD: CPT | Mod: CPTII,HCNC,S$GLB, | Performed by: STUDENT IN AN ORGANIZED HEALTH CARE EDUCATION/TRAINING PROGRAM

## 2025-05-06 PROCEDURE — 3061F NEG MICROALBUMINURIA REV: CPT | Mod: CPTII,HCNC,S$GLB, | Performed by: STUDENT IN AN ORGANIZED HEALTH CARE EDUCATION/TRAINING PROGRAM

## 2025-05-06 PROCEDURE — 17000 DESTRUCT PREMALG LESION: CPT | Mod: HCNC,XS,S$GLB, | Performed by: STUDENT IN AN ORGANIZED HEALTH CARE EDUCATION/TRAINING PROGRAM

## 2025-05-06 PROCEDURE — 1159F MED LIST DOCD IN RCRD: CPT | Mod: CPTII,HCNC,S$GLB, | Performed by: STUDENT IN AN ORGANIZED HEALTH CARE EDUCATION/TRAINING PROGRAM

## 2025-05-06 PROCEDURE — 3051F HG A1C>EQUAL 7.0%<8.0%: CPT | Mod: CPTII,HCNC,S$GLB, | Performed by: STUDENT IN AN ORGANIZED HEALTH CARE EDUCATION/TRAINING PROGRAM

## 2025-05-06 PROCEDURE — 99999 PR PBB SHADOW E&M-EST. PATIENT-LVL IV: CPT | Mod: PBBFAC,HCNC,, | Performed by: STUDENT IN AN ORGANIZED HEALTH CARE EDUCATION/TRAINING PROGRAM

## 2025-05-06 PROCEDURE — 4010F ACE/ARB THERAPY RXD/TAKEN: CPT | Mod: CPTII,HCNC,S$GLB, | Performed by: STUDENT IN AN ORGANIZED HEALTH CARE EDUCATION/TRAINING PROGRAM

## 2025-05-06 PROCEDURE — 1101F PT FALLS ASSESS-DOCD LE1/YR: CPT | Mod: CPTII,HCNC,S$GLB, | Performed by: STUDENT IN AN ORGANIZED HEALTH CARE EDUCATION/TRAINING PROGRAM

## 2025-05-06 PROCEDURE — 3072F LOW RISK FOR RETINOPATHY: CPT | Mod: CPTII,HCNC,S$GLB, | Performed by: STUDENT IN AN ORGANIZED HEALTH CARE EDUCATION/TRAINING PROGRAM

## 2025-05-06 PROCEDURE — 17110 DESTRUCTION B9 LES UP TO 14: CPT | Mod: HCNC,S$GLB,, | Performed by: STUDENT IN AN ORGANIZED HEALTH CARE EDUCATION/TRAINING PROGRAM

## 2025-05-06 NOTE — PATIENT INSTRUCTIONS
CRYOSURGERY      Your doctor has used a method called cryosurgery to treat your skin condition. Cryosurgery refers to the use of very cold substances to treat a variety of skin conditions such as warts, pre-skin cancers, molluscum contagiosum, sun spots, and several benign growths. The substance we use in cryosurgery is liquid nitrogen and is so cold (-195 degrees Celsius) that is burns when administered.     Following treatment in the office, the skin may immediately burn and become red. You may find the area around the lesion is affected as well. It is sometimes necessary to treat not only the lesion, but a small area of the surrounding normal skin to achieve a good response.     A blister, and even a blood filled blister, may form after treatment.   This is a normal response. If the blister is painful, it is acceptable to sterilize a needle and with rubbing alcohol and gently pop the blister. It is important that you gently wash the area with soap and warm water as the blister fluid may contain wart virus if a wart was treated. Do no remove the roof of the blister.     The area treated can take anywhere from 1-3 weeks to heal. Healing time depends on the kind skin lesion treated, the location, and how aggressively the lesion was treated. It is recommended that the areas treated are covered with Vaseline or bacitracin ointment and a band-aid. If a band-aid is not practical, just ointment applied several times per day will do. Keeping these areas moist will speed the healing time.    Treatment with liquid nitrogen can leave a scar. In dark skin, it may be a light or dark scar, in light skin it may be a white or pink scar. These will generally fade with time, but may never go away completely.     If you have any concerns after your treatment, please feel free to call the office.       [address] [place] [address]/ [phone] FAX/ [email or web address]     Sunscreen Guidelines  Sunscreen  protects your skin by absorbing and reflecting ultraviolet rays. All sunscreens have a sun protection factor (SPF) rating that indicates how long a sunscreen will remain effective on the skin.    Why protect your skin?  The sun's rays are composed of many different wavelengths, including UVA, UVB, and visible light that each affect the skin differently.    UVB: sunburn, photoaging, skin cancer (melanoma, basal cell, and squamous cell carcinomas) and modulation of the skin's immune system.    UVA: similar to above but thought to contribute more to aging; at the same dose of UVB it is less powerful however the sun has 10-20 times the levels of UVA as compared with UVB.  Visible light: implicated in causing unwanted darkening of skin, such as melasma and post-inflammatory hyperpigmentation in darker skin types     If I have dark skin, do I need to worry about the sun?    More darkly pigmented skin is more protected against UV-induced skin cancer, sunburn, and photoaging, though may still suffer from sun-related conditions, including melasma, hyperpigmentation, and other dark spots.    Sun avoidance  As a general rule, stay out of the sun as much as possible between 10 a.m. - 4 p.m.    Download the EPA UV index agnes to track the UV index by hour in your zip code.      Which sunscreen should I choose?  The best sunscreen to use varies by individual. The one that feels best on your skin and fits your lifestyle will be the one you will likely use most regularly.   Active ingredients of sunscreen vary by , and may be a chemical (such as avobenzone or oxybenzone) or physical agent (such as zinc oxide or titanium dioxide). I recommend a physical agent.  A water-resistant sunscreen is one that maintains the SPF level after 40 minutes of water exposure. A very water-resistant sunscreen maintains the SPF level after 80 minutes of water exposure.    Sunscreen: this is the last layer in sun protection   Be generous: 1  "shot glass of sunscreen for your body, ½ teaspoon for your face/neck  Reapply every 2 hours  Broad spectrum (provides UVA/UVB protection), SPF 50 or above  Avoid spray sunscreens: less effective and have been found to contain benzene (carcinogen)    Sun protective clothing  Although sunscreen helps minimize sun damage, no sunscreen completely blocks all wavelengths of UV light. Wearing sun protective clothing such as hats, rashguards or swim shirts, and long sleeves and/or pants, as well as avoiding sun exposure from 10 a.m. to 4 p.m. will help protect your skin from overexposure and minimize sun damage. Seek shade.  Long sleeved clothing, hats, and sunglasses: makes sun protection easier, more effective, and can even be more affordable, since sunscreen needs to be reapplied frequently.    Solumbra (www.sunprectautions.Combined Effort)  OPTIMIZERx (www.PlayGiga)  Reeheribar (www.XGear)  Land's end (www.Newtopia)  Hats from Danni Mitch ([email or web address])    My Favorite Sunscreens:  Physical blockers: Can have a "white case" but in general are more effective  - Face: CeraVe tinted mineral sunscreen, Bare Minerals complexion rescue (20 shades), Elta MD (UV elements, UV physical, UV restore, etc), Tizo ultra zinc tinted, Cetaphil Sheer Mineral Face Liquid Sunscreen  - Body: Blue Lizard, Neutrogena Sheer Zinc, Eucerin Daily Protection, Aveeno Baby   "

## 2025-05-06 NOTE — PROGRESS NOTES
Subjective:      Patient ID:  Vivienne Jose is a 74 y.o. female who presents for No chief complaint on file.    Pt here for TBSE     Pt last seen on 1/22/2024 for skin check     Pt denies h/o skin cancer but has family h/o NMSC     Pt concerned about lesion on R side of neck      Review of Systems    Objective:   Physical Exam   Constitutional: She appears well-developed and well-nourished. No distress.   Neurological: She is alert and oriented to person, place, and time. She is not disoriented.   Psychiatric: She has a normal mood and affect.   Skin:   Areas Examined (abnormalities noted in diagram):   Scalp / Hair Palpated and Inspected  Head / Face Inspection Performed  Neck Inspection Performed  Chest / Axilla Inspection Performed  Abdomen Inspection Performed  Genitals / Buttocks / Groin Inspection Performed  Back Inspection Performed  RUE Inspected  LUE Inspection Performed  RLE Inspected  LLE Inspection Performed  Nails and Digits Inspection Performed                 Diagram Legend     Erythematous scaling macule/papule c/w actinic keratosis       Vascular papule c/w angioma      Pigmented verrucoid papule/plaque c/w seborrheic keratosis      Yellow umbilicated papule c/w sebaceous hyperplasia      Irregularly shaped tan macule c/w lentigo     1-2 mm smooth white papules consistent with Milia      Movable subcutaneous cyst with punctum c/w epidermal inclusion cyst      Subcutaneous movable cyst c/w pilar cyst      Firm pink to brown papule c/w dermatofibroma      Pedunculated fleshy papule(s) c/w skin tag(s)      Evenly pigmented macule c/w junctional nevus     Mildly variegated pigmented, slightly irregular-bordered macule c/w mildly atypical nevus      Flesh colored to evenly pigmented papule c/w intradermal nevus       Pink pearly papule/plaque c/w basal cell carcinoma      Erythematous hyperkeratotic cursted plaque c/w SCC      Surgical scar with no sign of skin cancer recurrence      Open and closed  comedones      Inflammatory papules and pustules      Verrucoid papule consistent consistent with wart     Erythematous eczematous patches and plaques     Dystrophic onycholytic nail with subungual debris c/w onychomycosis     Umbilicated papule    Erythematous-base heme-crusted tan verrucoid plaque consistent with inflamed seborrheic keratosis     Erythematous Silvery Scaling Plaque c/w Psoriasis     See annotation      Assessment / Plan:        Seborrheic keratoses, inflamed  -lesion was itchy and painful  Cryosurgery procedure note:    Verbal consent from the patient is obtained including, but not limited to, risk of hypopigmentation/hyperpigmentation, scar, recurrence of lesion. Liquid nitrogen cryosurgery is applied to 1 lesions to produce a freeze injury. The patient is aware that blisters may form and is instructed on wound care with gentle cleansing and use of vaseline ointment to keep moist until healed. The patient is supplied a handout on cryosurgery and is instructed to call if lesions do not completely resolve.    Actinic keratosis  Cryosurgery Procedure Note    Verbal consent from the patient is obtained including, but not limited to, risk of hypopigmentation/hyperpigmentation, scar, recurrence of lesion. The patient is aware of the precancerous quality and need for treatment of these lesions. Liquid nitrogen cryosurgery is applied to the 1 actinic keratoses, as detailed in the physical exam, to produce a freeze injury. The patient is aware that blisters may form and is instructed on wound care with gentle cleansing and use of vaseline ointment to keep moist until healed. The patient is supplied a handout on cryosurgery and is instructed to call if lesions do not completely resolve.    Berman angioma  Seborrheic keratoses  Multiple benign nevi  Reassurance given to patient. No treatment is necessary.   Treatment of benign, asymptomatic lesions may be considered cosmetic.    Screening exam for skin  cancer  Total body skin examination performed today including at least 12 points as noted in physical examination. No lesions suspicious for malignancy noted.    Recommend daily sun protection/avoidance, use of at least SPF 30, broad spectrum sunscreen (OTC drug), skin self examinations, and routine physician surveillance to optimize early detection             Follow up in about 1 year (around 5/6/2026) for TBSE.

## 2025-05-16 ENCOUNTER — CLINICAL SUPPORT (OUTPATIENT)
Dept: ALLERGY | Facility: CLINIC | Age: 75
End: 2025-05-16
Payer: MEDICARE

## 2025-05-16 DIAGNOSIS — J30.9 CHRONIC ALLERGIC RHINITIS: Primary | ICD-10-CM

## 2025-05-16 PROCEDURE — 95115 IMMUNOTHERAPY ONE INJECTION: CPT | Mod: HCNC,S$GLB,, | Performed by: ALLERGY & IMMUNOLOGY

## 2025-05-21 ENCOUNTER — PATIENT MESSAGE (OUTPATIENT)
Dept: HEPATOLOGY | Facility: CLINIC | Age: 75
End: 2025-05-21
Payer: MEDICARE

## 2025-05-21 ENCOUNTER — TELEPHONE (OUTPATIENT)
Dept: PRIMARY CARE CLINIC | Facility: CLINIC | Age: 75
End: 2025-05-21
Payer: MEDICARE

## 2025-05-23 ENCOUNTER — DOCUMENTATION ONLY (OUTPATIENT)
Dept: PRIMARY CARE CLINIC | Facility: CLINIC | Age: 75
End: 2025-05-23
Payer: MEDICARE

## 2025-05-23 NOTE — PROGRESS NOTES
Goals:  What are your health and wellness goals?   Wants less back pain, have more mobility, loose weight   Why are they important to you?   Wants good health  Whats the most important thing youd like to share about your health story?   R knee replacement 2019, broke two vertebre in August 2024  Fitness:  Do you currently exercise?  no  What hobbies do you enjoy?  Computer games, card games, puzzles, read sometimes  Do you experience any pain, stiffness, or swelling on a regular basis?   Back pain, nuropathry in feet, hand swell,   Sleep:  How many hours do you sleep per night on average?  10pm/11pm, wakes every two hours, wakes at 6am  How would you describe your quality of sleep?  no  How is your energy level most days?  No energy  Nutrition:   What does a typical day of eating look like for you?   Breakfast: honeybun, diet coke,   Lunch: skip  Dinner: resturant  Snacks: none  Drinks: no water, diet coke  Fruit: apples, green grapes, mandrin, necterine's, canned peaches  Veggie: caulifower, broccoli, brussel sprouts, corn     Challenges with Preparing/Cooking Meals?  lazy  Difficulties Chewing or Swallowing?  Not chew, swallowing  Challenges with Access to Food?  no  What, if anything, would you like to change about your nutrition?   -  Do you regularly use any of the following:  Alcohol?  no  Tobacco Products? no  Other Substances? no    Mental & Emotional Health  How would you describe your overall mental and emotional health?  Okay   Extra:   Do you work?   Tues,wed,thurs 9-3pm  How many hours per week do you typically work?    Social Life:  Do you have a support system?  no  Do you engage in social life?    Preferred Appt Time:  Morning?   Afternoon? yes

## 2025-05-27 ENCOUNTER — RESULTS FOLLOW-UP (OUTPATIENT)
Dept: TRANSPLANT | Facility: CLINIC | Age: 75
End: 2025-05-27

## 2025-05-27 ENCOUNTER — OFFICE VISIT (OUTPATIENT)
Dept: OPTOMETRY | Facility: CLINIC | Age: 75
End: 2025-05-27
Payer: MEDICARE

## 2025-05-27 ENCOUNTER — LAB VISIT (OUTPATIENT)
Dept: LAB | Facility: HOSPITAL | Age: 75
End: 2025-05-27
Attending: INTERNAL MEDICINE
Payer: MEDICARE

## 2025-05-27 DIAGNOSIS — E11.9 TYPE 2 DIABETES MELLITUS WITHOUT RETINOPATHY: Primary | ICD-10-CM

## 2025-05-27 DIAGNOSIS — H52.4 MYOPIA WITH ASTIGMATISM AND PRESBYOPIA, BILATERAL: ICD-10-CM

## 2025-05-27 DIAGNOSIS — H52.13 MYOPIA WITH ASTIGMATISM AND PRESBYOPIA, BILATERAL: ICD-10-CM

## 2025-05-27 DIAGNOSIS — K74.60 HEPATIC CIRRHOSIS, UNSPECIFIED HEPATIC CIRRHOSIS TYPE, UNSPECIFIED WHETHER ASCITES PRESENT: ICD-10-CM

## 2025-05-27 DIAGNOSIS — H52.203 MYOPIA WITH ASTIGMATISM AND PRESBYOPIA, BILATERAL: ICD-10-CM

## 2025-05-27 DIAGNOSIS — H16.223 KERATOCONJUNCTIVITIS SICCA OF BOTH EYES NOT DUE TO SJOGREN'S SYNDROME: ICD-10-CM

## 2025-05-27 DIAGNOSIS — K74.69 OTHER CIRRHOSIS OF LIVER: ICD-10-CM

## 2025-05-27 DIAGNOSIS — Z96.1 PSEUDOPHAKIA OF BOTH EYES: ICD-10-CM

## 2025-05-27 LAB
ABSOLUTE EOSINOPHIL (OHS): 1.12 K/UL
ABSOLUTE MONOCYTE (OHS): 0.87 K/UL (ref 0.3–1)
ABSOLUTE NEUTROPHIL COUNT (OHS): 6.01 K/UL (ref 1.8–7.7)
ALBUMIN SERPL BCP-MCNC: 3 G/DL (ref 3.5–5.2)
ALP SERPL-CCNC: 97 UNIT/L (ref 40–150)
ALT SERPL W/O P-5'-P-CCNC: 14 UNIT/L (ref 10–44)
ANION GAP (OHS): 8 MMOL/L (ref 8–16)
AST SERPL-CCNC: 20 UNIT/L (ref 11–45)
BASOPHILS # BLD AUTO: 0.05 K/UL
BASOPHILS NFR BLD AUTO: 0.5 %
BILIRUB DIRECT SERPL-MCNC: 0.1 MG/DL (ref 0.1–0.3)
BILIRUB SERPL-MCNC: 0.3 MG/DL (ref 0.1–1)
BUN SERPL-MCNC: 21 MG/DL (ref 8–23)
CALCIUM SERPL-MCNC: 9.7 MG/DL (ref 8.7–10.5)
CHLORIDE SERPL-SCNC: 102 MMOL/L (ref 95–110)
CO2 SERPL-SCNC: 29 MMOL/L (ref 23–29)
CREAT SERPL-MCNC: 1.4 MG/DL (ref 0.5–1.4)
ERYTHROCYTE [DISTWIDTH] IN BLOOD BY AUTOMATED COUNT: 14.3 % (ref 11.5–14.5)
GFR SERPLBLD CREATININE-BSD FMLA CKD-EPI: 40 ML/MIN/1.73/M2
GLUCOSE SERPL-MCNC: 153 MG/DL (ref 70–110)
HCT VFR BLD AUTO: 40.8 % (ref 37–48.5)
HGB BLD-MCNC: 12.5 GM/DL (ref 12–16)
IMM GRANULOCYTES # BLD AUTO: 0.05 K/UL (ref 0–0.04)
IMM GRANULOCYTES NFR BLD AUTO: 0.5 % (ref 0–0.5)
INR PPP: 1 (ref 0.8–1.2)
LYMPHOCYTES # BLD AUTO: 2.15 K/UL (ref 1–4.8)
MCH RBC QN AUTO: 31.2 PG (ref 27–31)
MCHC RBC AUTO-ENTMCNC: 30.6 G/DL (ref 32–36)
MCV RBC AUTO: 102 FL (ref 82–98)
NUCLEATED RBC (/100WBC) (OHS): 0 /100 WBC
PLATELET # BLD AUTO: 256 K/UL (ref 150–450)
PMV BLD AUTO: 9.8 FL (ref 9.2–12.9)
POTASSIUM SERPL-SCNC: 5.5 MMOL/L (ref 3.5–5.1)
PROT SERPL-MCNC: 7.7 GM/DL (ref 6–8.4)
PROTHROMBIN TIME: 10.9 SECONDS (ref 9–12.5)
RBC # BLD AUTO: 4.01 M/UL (ref 4–5.4)
RELATIVE EOSINOPHIL (OHS): 10.9 %
RELATIVE LYMPHOCYTE (OHS): 21 % (ref 18–48)
RELATIVE MONOCYTE (OHS): 8.5 % (ref 4–15)
RELATIVE NEUTROPHIL (OHS): 58.6 % (ref 38–73)
SODIUM SERPL-SCNC: 139 MMOL/L (ref 136–145)
WBC # BLD AUTO: 10.25 K/UL (ref 3.9–12.7)

## 2025-05-27 PROCEDURE — 82565 ASSAY OF CREATININE: CPT

## 2025-05-27 PROCEDURE — 36415 COLL VENOUS BLD VENIPUNCTURE: CPT | Mod: PO

## 2025-05-27 PROCEDURE — 84075 ASSAY ALKALINE PHOSPHATASE: CPT

## 2025-05-27 PROCEDURE — 99999 PR PBB SHADOW E&M-EST. PATIENT-LVL IV: CPT | Mod: PBBFAC,,, | Performed by: OPTOMETRIST

## 2025-05-27 PROCEDURE — 82248 BILIRUBIN DIRECT: CPT

## 2025-05-27 PROCEDURE — 85610 PROTHROMBIN TIME: CPT

## 2025-05-27 PROCEDURE — 85025 COMPLETE CBC W/AUTO DIFF WBC: CPT

## 2025-05-27 NOTE — PROGRESS NOTES
HPI     Diabetic Eye Exam            Comments: Patient Vivienne Jose is a 74 year old female.          Comments    Pt here for annual diabetic eye exam. Pt states light sensitivity and   glare since BRYANNA, has to have lighting directly on paper to see. Pt states   that she is using computer part of lined trifocals to see better. Pt   states floaters in [place], may have some new floaters in [place]. Pt denies any   eye pain.    DLS: 05/24/2024 with Dr. Mathews    Meds: OTC allergy gtts PRN OU    POHx:  1. Type 2 diabetes mellitus without retinopathy  2. Pseudophakia of both eyes  3. Myopia with astigmatism and presbyopia, bilateral    (+)DM  Hemoglobin A1C       Date                     Value               Ref Range             Status        04/22/2025               7.4 (H)             4.0 - 5.6 %           Final         09/03/2024               6.3 (H)             4.0 - 5.6 %           Final                      06/03/2024               7.5 (H)             4.0 - 5.6 %           Final                      03/08/2024               7.2 (H)             4.0 - 5.6 %           Final                    Last edited by Lisa Mary on 5/27/2025  1:01 PM.            Assessment /Plan     For exam results, see Encounter Report.    Type 2 diabetes mellitus without retinopathy    Pseudophakia of both eyes    Myopia with astigmatism and presbyopia, bilateral    Keratoconjunctivitis sicca of both eyes not due to Sjogren's syndrome      No diabetic retinopathy, no csme. Return in 1 year for dilated eye exam.  2. Monitor condition. Patient to report any changes. RTC 1 year recheck.  3. New Spectacle Rx given, discussed different options for glasses. RTC 1 year routine eye exam.     4. May be contributing to glare, Recommend artificial tears. 1 drop 2-4x per day. Chronicity of disease and treatment discussed.

## 2025-06-05 ENCOUNTER — TELEPHONE (OUTPATIENT)
Dept: PAIN MEDICINE | Facility: CLINIC | Age: 75
End: 2025-06-05

## 2025-06-05 ENCOUNTER — OFFICE VISIT (OUTPATIENT)
Dept: PAIN MEDICINE | Facility: CLINIC | Age: 75
End: 2025-06-05
Payer: MEDICARE

## 2025-06-05 VITALS
WEIGHT: 233.69 LBS | BODY MASS INDEX: 43 KG/M2 | HEART RATE: 69 BPM | DIASTOLIC BLOOD PRESSURE: 74 MMHG | HEIGHT: 62 IN | SYSTOLIC BLOOD PRESSURE: 130 MMHG

## 2025-06-05 DIAGNOSIS — G89.4 CHRONIC PAIN SYNDROME: ICD-10-CM

## 2025-06-05 DIAGNOSIS — M47.816 LUMBAR SPONDYLOSIS: ICD-10-CM

## 2025-06-05 DIAGNOSIS — M54.16 LUMBAR RADICULOPATHY: Primary | ICD-10-CM

## 2025-06-05 DIAGNOSIS — M54.14 THORACIC RADICULOPATHY: ICD-10-CM

## 2025-06-05 PROCEDURE — 99999 PR PBB SHADOW E&M-EST. PATIENT-LVL IV: CPT | Mod: PBBFAC,,, | Performed by: STUDENT IN AN ORGANIZED HEALTH CARE EDUCATION/TRAINING PROGRAM

## 2025-06-06 ENCOUNTER — DOCUMENTATION ONLY (OUTPATIENT)
Dept: PRIMARY CARE CLINIC | Facility: CLINIC | Age: 75
End: 2025-06-06
Payer: MEDICARE

## 2025-06-10 ENCOUNTER — TELEPHONE (OUTPATIENT)
Dept: PAIN MEDICINE | Facility: CLINIC | Age: 75
End: 2025-06-10
Payer: MEDICARE

## 2025-06-11 ENCOUNTER — PATIENT MESSAGE (OUTPATIENT)
Dept: PRIMARY CARE CLINIC | Facility: CLINIC | Age: 75
End: 2025-06-11
Payer: MEDICARE

## 2025-06-13 ENCOUNTER — TELEPHONE (OUTPATIENT)
Dept: PAIN MEDICINE | Facility: HOSPITAL | Age: 75
End: 2025-06-13
Payer: MEDICARE

## 2025-06-13 ENCOUNTER — DOCUMENTATION ONLY (OUTPATIENT)
Dept: PRIMARY CARE CLINIC | Facility: CLINIC | Age: 75
End: 2025-06-13
Payer: MEDICARE

## 2025-06-13 NOTE — PROGRESS NOTES
Cardio: Treadmill, 10 mins    UPPER BODY  REPS:  WEIGHT:  MODS/ADVS    EXERCISE:          TheraBand 15       Green Loop 15       Front Arm Raises 2x15 2lbs     Lateral Arm Raises 2x15 2lbs     T ins/T outs 10       Bicep Row on Matrix 20 20lbs     Straight Arm Pull Down on Matrix 20 15lbs     Tricep Rope Pull on Matrix 15 10lb     Single Arm Pull on Matrix 15each 10lbs     Dumbbell Push/Pull/Lift 10/10 5lbs              Wall Push Ups 10                                                 Additional Notes: no issues, some back pain, pt said she would just push through, did not want to stop exercises

## 2025-06-16 ENCOUNTER — PATIENT MESSAGE (OUTPATIENT)
Dept: SLEEP MEDICINE | Facility: CLINIC | Age: 75
End: 2025-06-16
Payer: MEDICARE

## 2025-06-17 ENCOUNTER — HOSPITAL ENCOUNTER (OUTPATIENT)
Facility: HOSPITAL | Age: 75
Discharge: HOME | End: 2025-06-17
Attending: STUDENT IN AN ORGANIZED HEALTH CARE EDUCATION/TRAINING PROGRAM | Admitting: STUDENT IN AN ORGANIZED HEALTH CARE EDUCATION/TRAINING PROGRAM
Payer: MEDICARE

## 2025-06-17 VITALS
RESPIRATION RATE: 16 BRPM | HEART RATE: 71 BPM | TEMPERATURE: 98 F | OXYGEN SATURATION: 94 % | SYSTOLIC BLOOD PRESSURE: 126 MMHG | DIASTOLIC BLOOD PRESSURE: 58 MMHG

## 2025-06-17 DIAGNOSIS — M54.16 LUMBAR RADICULOPATHY: Primary | ICD-10-CM

## 2025-06-17 LAB — POCT GLUCOSE: 112 MG/DL (ref 70–110)

## 2025-06-17 PROCEDURE — 64483 NJX AA&/STRD TFRM EPI L/S 1: CPT | Mod: 50,,, | Performed by: STUDENT IN AN ORGANIZED HEALTH CARE EDUCATION/TRAINING PROGRAM

## 2025-06-17 PROCEDURE — 82962 GLUCOSE BLOOD TEST: CPT | Performed by: STUDENT IN AN ORGANIZED HEALTH CARE EDUCATION/TRAINING PROGRAM

## 2025-06-17 PROCEDURE — 63600175 PHARM REV CODE 636 W HCPCS: Performed by: STUDENT IN AN ORGANIZED HEALTH CARE EDUCATION/TRAINING PROGRAM

## 2025-06-17 PROCEDURE — 64483 NJX AA&/STRD TFRM EPI L/S 1: CPT | Mod: 50 | Performed by: STUDENT IN AN ORGANIZED HEALTH CARE EDUCATION/TRAINING PROGRAM

## 2025-06-17 PROCEDURE — 99152 MOD SED SAME PHYS/QHP 5/>YRS: CPT | Performed by: STUDENT IN AN ORGANIZED HEALTH CARE EDUCATION/TRAINING PROGRAM

## 2025-06-17 PROCEDURE — 25500020 PHARM REV CODE 255: Performed by: STUDENT IN AN ORGANIZED HEALTH CARE EDUCATION/TRAINING PROGRAM

## 2025-06-17 RX ORDER — DEXAMETHASONE SODIUM PHOSPHATE 10 MG/ML
INJECTION, SOLUTION INTRA-ARTICULAR; INTRALESIONAL; INTRAMUSCULAR; INTRAVENOUS; SOFT TISSUE
Status: DISCONTINUED | OUTPATIENT
Start: 2025-06-17 | End: 2025-06-17 | Stop reason: HOSPADM

## 2025-06-17 RX ORDER — MIDAZOLAM HYDROCHLORIDE 1 MG/ML
INJECTION INTRAMUSCULAR; INTRAVENOUS
Status: DISCONTINUED | OUTPATIENT
Start: 2025-06-17 | End: 2025-06-17 | Stop reason: HOSPADM

## 2025-06-17 RX ORDER — SODIUM CHLORIDE 9 MG/ML
500 INJECTION, SOLUTION INTRAVENOUS CONTINUOUS
Status: DISCONTINUED | OUTPATIENT
Start: 2025-06-17 | End: 2025-06-17 | Stop reason: HOSPADM

## 2025-06-17 RX ORDER — ONDANSETRON HYDROCHLORIDE 2 MG/ML
INJECTION, SOLUTION INTRAVENOUS
Status: DISCONTINUED | OUTPATIENT
Start: 2025-06-17 | End: 2025-06-17 | Stop reason: HOSPADM

## 2025-06-17 RX ORDER — FENTANYL CITRATE 50 UG/ML
INJECTION, SOLUTION INTRAMUSCULAR; INTRAVENOUS
Status: DISCONTINUED | OUTPATIENT
Start: 2025-06-17 | End: 2025-06-17 | Stop reason: HOSPADM

## 2025-06-17 RX ORDER — LIDOCAINE HYDROCHLORIDE 10 MG/ML
INJECTION, SOLUTION EPIDURAL; INFILTRATION; INTRACAUDAL; PERINEURAL
Status: DISCONTINUED | OUTPATIENT
Start: 2025-06-17 | End: 2025-06-17 | Stop reason: HOSPADM

## 2025-06-17 RX ORDER — LIDOCAINE HYDROCHLORIDE 20 MG/ML
INJECTION, SOLUTION EPIDURAL; INFILTRATION; INTRACAUDAL; PERINEURAL
Status: DISCONTINUED | OUTPATIENT
Start: 2025-06-17 | End: 2025-06-17 | Stop reason: HOSPADM

## 2025-06-17 NOTE — DISCHARGE INSTRUCTIONS
[facility]  [employer] service # [phone]  On-call pager for emergency# [phone]     POST-PROCEDURE INSTRUCTIONS:    Today you had an injection that included a steroid medications.  The steroid may or may not have been mixed with a local anesthetic when it was injected.   If the injection was in the neck, you may feel some pressure, numbness, or slight weakness in the arm after the procedure for a short period of time (this is a normal response), if this persists for longer than 1 day please contact our office or go to the emergency room.  If the injection was in the low back, you may feel some pressure, numbness, or slight weakness in the leg after the procedure for a short period of time (this is a normal response), if this persists for longer than 1 day please contact our office or go to the emergency room.  You may get side effects from the steroid.  This is not uncommon.  Symptoms include: elevated blood sugar, elevated blood pressure, headache, flushing, nausea, insomnia.  These symptoms are transient and will resolve within 1-3 days.  If symptoms last longer than this please contact our office or head to the emergency room.  Steroid medications can take anywhere from 3-14 days to take effect (rarely longer).  You may notice that your pain worsens for a short period of time after the injection, this would not be unusual due to the pressure and trauma from the needle.    If you do not have a follow up appointment scheduled, please contact my office (or the office of the physician who referred you for the procedure) to get a post-procedure follow up scheduled 2-4 weeks after the procedure.  This can be done as a virtual visit if that is more convenient for you.      What you need to do:    Keep a record of your response to the injection you had today.    How much relief did you get?   When did the relief start and how long did it last?  Were you  able to decrease the use of any of your pain medications?  Were you able to increase your level of activity?  How long did the relief last?    What to watch out for:    If you experience any of the following symptoms after your procedure, please notify the messaging service immediately (see above for contact information):   fever (increased oral temperature)   bleeding or swelling at the injection site,    drainage, rash or redness at the injection site    possible signs of infection    increased pain at the injection site   worsening of your usual pain   severe headache   new or worsening numbness    new arm and/or leg weakness, or    changes in bowel and/or bladder function: urinating or defecating on yourself and not knowing that you did it.    PLEASE FOLLOW ALL INSTRUCTIONS CAREFULLY     Do not engage in strenuous activity (e.g., lifting or pushing heavy objects or repeated bending) for 24 hours.     Do not take a bath, swim or use Jacuzzi for 24 hours after procedure. (A shower is fine).   Remove any Band-Aids when you get home.    Use cold/ice, as needed for comfort.  We recommend the use of cold therapy alternating on for 20 minutes, off for 20 minutes.    Do not apply direct heat (heating pad or heat packs) to the injection site for 24 hours.     Resume your usual medications, unless instructed otherwise by your Pain Physician.     If you are on warfarin (Coumadin) or other blood thinner, resume this medication as instructed by your prescribing Physician.    IF AT ANY POINT YOU ARE VERY CONCERNED ABOUT YOUR SYMPTOMS, PLEASE GO TO THE EMERGENCY ROOM.    If you develop worsening pain, weakness, numbness, lose bowel or bladder control (i.e., having an accident where you did not even know you had to go to the bathroom and suddenly noticed you soiled yourself), saddle anesthesia (a loss of sensation restricted to the area of the buttocks, anus and between the legs -- i.e., those parts of your body that would  touch a saddle if you were sitting on one) you need to go immediately to the emergency department for evaluation and treatment.    ----------------------------------------------------------------------------------------------------------------------------------------------------------------  If you received Sedation please read the following instructions:  POST SEDATION INSTRUCTIONS    Today you received intravenous medication (also known as sedation) that was used to help you relax and/or decrease discomfort during your procedure. This medication will be acting in your body for the next 24 hours, so you might feel a little tired or sleepy. This feeling will slowly wear off.   Common side effects associated with these medications include: drowsiness, dizziness, sleepiness, confusion, feeling excited, difficulty remembering things, lack of steadiness with walking or balance, loss of fine muscle control, slowed reflexes, difficulty focusing, and blurred vision.  Some over-the-counter and prescription medications (e.g., muscle relaxants, opioids, mood-altering medications, sedatives/hypnotics, antihistamines) can interact with the intravenous medication you received and cause an increased risk of the side effects listed above in addition to other potentially life threatening side effects. Use extreme caution if you are taking such medications, and consult with your Pain Physician or prescribing physician if you have any questions.  For the next 12-24 hours:    DO NOT--Drive a car, operate machinery or power tools   DO NOT--Drink any alcoholic beverages (not even beer), they may dangerously increase the risk of side effects.    DO NOT--Make any important legal or business decisions or sign important documents.  We advise you to have someone to assist you at home. Move slowly and carefully. Do not make sudden changes in position. Be aware of dizziness or light-headedness and move accordingly.   If you seek medical  treatment within 24 hours, let the nurse or doctor caring for you know that you have received the above medications. If you have any questions or concerns related to your sedation or treatment today please contact us.     Consent: The patient's consent was obtained including but not limited to risks of crusting, scabbing, blistering, scarring, darker or lighter pigmentary change, recurrence, incomplete removal and infection. Show Aperture Variable?: Yes Render Note In Bullet Format When Appropriate: No Detail Level: Simple Number Of Freeze-Thaw Cycles: 2 freeze-thaw cycles Post-Care Instructions: I reviewed with the patient in detail post-care instructions. Patient is to wear sunprotection, and avoid picking at any of the treated lesions. Pt may apply Vaseline to crusted or scabbing areas. Duration Of Freeze Thaw-Cycle (Seconds): 3

## 2025-06-17 NOTE — H&P
HPI  Patient presenting for Procedure(s) (LRB):  b/l L1-2 TFESI (Bilateral)     Patient on Anti-coagulation No    No health changes since previous encounter    Past Medical History:   Diagnosis Date    Allergy     Angio-edema     Arthritis     Cataract     bilateral - not removed    Cerebrovascular malformation     Cirrhosis 11/24/2020    Colon polyps     Diabetes mellitus, type 2     Diabetic peripheral neuropathy     Edema of both feet 8/19/2022    GERD (gastroesophageal reflux disease)     Herpes infection     Hypothyroidism     Kidney stones     Mild nonproliferative diabetic retinopathy of 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 edema associated with type 2 diabetes mellitus 4/18/2019    DEL RIO (nonalcoholic steatohepatitis) 8/19/2022    Nausea & vomiting 11/23/2015    Obesity, morbid     Ovarian cyst     Seizure disorder, focal motor     Sleep apnea     Type II or unspecified type diabetes mellitus with neurological manifestations, uncontrolled(250.62)     Urticaria      Past Surgical History:   Procedure Laterality Date    CARPAL TUNNEL RELEASE Right 2014    CATARACT EXTRACTION W/  INTRAOCULAR LENS IMPLANT Right 9/19/2023    Procedure: EXTRACTION, CATARACT, WITH IOL INSERTION;  Surgeon: Lyndon Ortiz MD;  Location: [facility] OR;  Service: Ophthalmology;  Laterality: Right;    CATARACT EXTRACTION W/  INTRAOCULAR LENS IMPLANT Left 10/3/2023    Procedure: EXTRACTION, CATARACT, WITH IOL INSERTION;  Surgeon: Lyndon Ortiz MD;  Location: [facility] OR;  Service: Ophthalmology;  Laterality: Left;    COLONOSCOPY      COLONOSCOPY N/A 9/13/2017    Procedure: COLONOSCOPY Golytely;  Surgeon: Gisela Wall MD;  Location: [facility];  Service: Endoscopy;  Laterality: N/A;    COLONOSCOPY N/A 9/9/2022    Procedure: COLONOSCOPY Extended Suprep;  Surgeon: Britt Jasso MD;  Location: [facility] ENDO;  Service: Endoscopy;  Laterality: N/A;    CYST REMOVAL      skin; multiples    ESOPHAGOGASTRODUODENOSCOPY Left 10/15/2021     Procedure: EGD (ESOPHAGOGASTRODUODENOSCOPY);  Surgeon: Luis Fernando Taveras MD;  Location: [facility] (4TH FLR);  Service: Endoscopy;  Laterality: Left;  cirrhosis labwork am of procedure  last seizure 1973  COVID test on 10/12/21 at [facility]    ESOPHAGOGASTRODUODENOSCOPY N/A 3/10/2023    Procedure: EGD (ESOPHAGOGASTRODUODENOSCOPY);  Surgeon: Christa Caldera MD;  Location: [facility] (2ND FLR);  Service: Endoscopy;  Laterality: N/A;  Medically Urgent  cirrohsis-labs done on 2/9/23 (< 90 days)  New onset A-fib  3/1 instructions to portal-st  Precall complete- [place]    ESOPHAGOGASTRODUODENOSCOPY N/A 4/17/2025    Procedure: EGD (ESOPHAGOGASTRODUODENOSCOPY);  Surgeon: Alec Gilbert MD;  Location: [facility];  Service: Endoscopy;  Laterality: N/A;  ref by    Nell Christianson MD, portal,glp-1, cirrhosis labs completed-ae    EXTRACORPOREAL SHOCK WAVE LITHOTRIPSY  2002    HYSTERECTOMY  1984    INJECTION, EPIDURAL, SPINE, CERVICOTHORACIC, TRANSFORAMINAL APPROACH Bilateral 1/28/2025    Procedure: b/l L1-l2 TFESI;  Surgeon: Myron Ocasio DO;  Location: [facility];  Service: Pain Management;  Laterality: Bilateral;  ASA 5 days Ozempic 7 days    JOINT REPLACEMENT Right 03/06/2019    knee    KIDNEY STONE SURGERY      KNEE ARTHROPLASTY Right 3/6/2019    Procedure: ARTHROPLASTY, KNEE;  Surgeon: Pj Gamboa MD;  Location: [facility] OR;  Service: Orthopedics;  Laterality: Right;  Depuy (Stephen notified 2/21, CC)    KYPHOPLASTY, SPINE, LUMBAR Bilateral 9/23/2024    Procedure: T12 and L1 SpineJack ([place]);  Surgeon: Myron Ocasio DO;  Location: [facility] OR;  Service: Pain Management;  Laterality: Bilateral;    THYROIDECTOMY  1977         TONSILLECTOMY, ADENOIDECTOMY      TRIGGER FINGER RELEASE      TUBAL LIGATION       Review of patient's allergies indicates:   Allergen Reactions    Sulfa (sulfonamide antibiotics) Nausea Only    Ciprofloxacin     Januvia [sitagliptin]      abd pain    Lisinopril     Lotensin  [benazepril]     Nexium [esomeprazole magnesium] Other (See Comments)     Gas      Current Facility-Administered Medications   Medication    0.9% NaCl infusion       PMHx, PSHx, Allergies, Medications reviewed in epic    ROS negative except pain complaints in HPI    OBJECTIVE:    /61 (BP Location: Left arm, Patient Position: Lying)   Pulse 71   Temp 97.9 °F (36.6 °C) (Temporal)   Resp 16   LMP  (LMP Unknown)   SpO2 97%   Breastfeeding No     PHYSICAL EXAMINATION:    GENERAL: Well appearing, in no acute distress, alert and oriented x3.  PSYCH:  Mood and affect appropriate.  SKIN: Skin color, texture, turgor normal, no rashes or lesions which will impact the procedure.  CV: RRR with palpation of the radial artery.  PULM: No evidence of respiratory difficulty, symmetric chest rise. Clear to auscultation.  NEURO: Cranial nerves grossly intact.    Plan:    Proceed with procedure as planned Procedure(s) (LRB):  b/l L1-2 TFESI (Bilateral)    Myron Wang  06/17/2025

## 2025-06-17 NOTE — OP NOTE
"PROCEDURE: bilateral Lumbar L1-2 Transforaminal Epidural Steroid Injection    Patient Name: Vivienne Jose  MRN: [id number]    PROCEDURE DATE: 6/17/2025    INJECTION # 2    DIAGNOSIS: Lumbar Radiculopathy  CPT CODE: 34668 (INJECTION(S), ANESTHETIC AGENT(S) AND/OR STEROID; TRANSFORAMINAL EPIDURAL, WITH IMAGING GUIDANCE (FLUOROSCOPY OR CT), LUMBAR OR SACRAL, SINGLE LEVEL), 23218 (Each additional level).     POSTPROCEDURE DIAGNOSIS: Same    PHYSICIAN: Myron Ocasio DO  NEEDLE TYPE: - 22G 5" Spinal Needle  MEDICATIONS INJECTED: 6cc mixture of Dexamethasone (10mg/1ml) and 5cc Lidocaine 1% PF split equally between levels  CONTRAST: Omni 300    Sedation Medications - Mild Sedation with 1mg Versed and 50 mcg Fentanyl    Estimated Blood Loss - <2ml  Drains: None  Specimens Removed: None  Urine Output - Not Measured  Complications: Left side contrast flow not as good as the right. Technically difficult  Outcome: Good    Informed Consent:  The patient's condition and proposed procedures, risks, and alternatives were discussed with the patient or responsible party.  The patient's / responsible party's questions were answered.   The patient / responsible party appeared to understand and chose to proceed.  Informed consent was obtained.  After obtaining written consent, an IV hep lock was placed. (See nurses notes for details).     Procedure in Detail:  The patient was taken back to the OR suite and placed in a prone position. The skin overlying the injection site was prepped and draped in an aseptic fashion. The target injection site (see above) was identified with fluoroscopy.     Procedural Pause:  A procedural pause verifying correct patient, medical record number, allergies, medications to be administered, current vital signs, and surgical site was performed immediately prior to beginning the procedure.    The skin and subcutaneous tissue overlying the target site(s) of injection for the L1-2 transforaminal epidural " steroid injection was/were anesthetized using 4 mL of 1% lidocaine with a 25-gauge, 1½-inch needle.      The fluoroscopic beam was aligned to create a tunnel view.  The above noted needle was advanced parallel to the fluoroscopic beam towards the above noted foramen under fluoroscopic guidance.  The final position of the needle(s) was identified using AP and lateral views.  Paresthesias were not noted with final needle positioning.       A microbore extension tubing was attached to the needle to minimize any movement of the needle during injection or syringe change.  After negative aspiration for heme or CSF at each site(s) where the needle(s) was placed, 2ml of contrast dye was injected to confirm appropriate placement and that there was no vascular uptake.  Pain provocation by the injected contrast material was not noted.  Then the injectate solution described above was injected in increments.  The needle was then retracted approximately FPC and the needle track was flushed with 0.5 mL of Lidocaine 1%.  The needle(s) was then removed.     The same procedural technique outlined above was repeated on the OPPOSITE side.    The heart rate, pulse oximetry, and blood pressure were continuously monitored throughout the procedure.  The procedure was well tolerated. She was carefully escorted to the recovery room in stable condition. Patient was monitored by RN for recovery period.  The patient will be contacted in the next few days to determine extent of relief.  Patient was given post procedure and discharge instructions to follow at home.  The patient was discharged in a stable condition.    Note Electronically Signed By:  Myron Wang  06/17/2025

## 2025-06-17 NOTE — PLAN OF CARE
Bandage(s) to injection site are clean, dry and intact without drainage. There is no edema or skin discoloration noted at perimeter of site.    Discharge instructions given and explained to patient with verbalization of understanding all instructions. Patients v/s stable, denies n/v and tolerating po, rates pain level tolerable, IV removed, and ready for patient discharge home.

## 2025-06-17 NOTE — DISCHARGE SUMMARY
[facility]  Discharge Note  Short Stay    Procedure(s) (LRB):  b/l L1-2 TFESI (Bilateral)      OUTCOME: Patient tolerated treatment/procedure well without complication and is now ready for discharge.    DISPOSITION: Home or Self Care    FINAL DIAGNOSIS:  <principal problem not specified>    FOLLOWUP: In clinic    DISCHARGE INSTRUCTIONS:  No discharge procedures on file.     TIME SPENT ON DISCHARGE: 10 minutes

## 2025-06-20 ENCOUNTER — CLINICAL SUPPORT (OUTPATIENT)
Dept: ALLERGY | Facility: CLINIC | Age: 75
End: 2025-06-20
Payer: MEDICARE

## 2025-06-20 DIAGNOSIS — J30.9 CHRONIC ALLERGIC RHINITIS: Primary | ICD-10-CM

## 2025-06-20 PROCEDURE — 95115 IMMUNOTHERAPY ONE INJECTION: CPT | Mod: HCNC,S$GLB,, | Performed by: STUDENT IN AN ORGANIZED HEALTH CARE EDUCATION/TRAINING PROGRAM

## 2025-06-22 ENCOUNTER — PATIENT MESSAGE (OUTPATIENT)
Dept: PRIMARY CARE CLINIC | Facility: CLINIC | Age: 75
End: 2025-06-22
Payer: MEDICARE

## 2025-06-22 DIAGNOSIS — E11.69 DYSLIPIDEMIA ASSOCIATED WITH TYPE 2 DIABETES MELLITUS: ICD-10-CM

## 2025-06-22 DIAGNOSIS — E78.5 DYSLIPIDEMIA ASSOCIATED WITH TYPE 2 DIABETES MELLITUS: ICD-10-CM

## 2025-06-23 RX ORDER — LOVASTATIN 40 MG/1
TABLET ORAL
Qty: 90 TABLET | Refills: 3 | Status: SHIPPED | OUTPATIENT
Start: 2025-06-23

## 2025-06-25 ENCOUNTER — TELEPHONE (OUTPATIENT)
Dept: PRIMARY CARE CLINIC | Facility: CLINIC | Age: 75
End: 2025-06-25
Payer: MEDICARE

## 2025-06-25 NOTE — TELEPHONE ENCOUNTER
Copied from CRM #[id number]. Topic: General Inquiry - Patient Advice  >> Jun 23, 2025  1:01 PM Jennifer wrote:  Patient is returning a phone call.    Who left a message for the patient: Maria R    Does patient know what this is regarding:  r/s an appt the pt cancelled    Would you like a call back, or a response through your [facility] portal?:  call back to pt     Best call back number:    Comments:

## 2025-06-27 ENCOUNTER — OFFICE VISIT (OUTPATIENT)
Dept: SLEEP MEDICINE | Facility: CLINIC | Age: 75
End: 2025-06-27
Attending: PSYCHIATRY & NEUROLOGY
Payer: MEDICARE

## 2025-06-27 ENCOUNTER — TELEPHONE (OUTPATIENT)
Dept: PHARMACY | Facility: CLINIC | Age: 75
End: 2025-06-27
Payer: MEDICARE

## 2025-06-27 VITALS
HEART RATE: 74 BPM | SYSTOLIC BLOOD PRESSURE: 138 MMHG | WEIGHT: 231.69 LBS | BODY MASS INDEX: 42.38 KG/M2 | OXYGEN SATURATION: 95 % | DIASTOLIC BLOOD PRESSURE: 63 MMHG

## 2025-06-27 DIAGNOSIS — E11.59 HYPERTENSION ASSOCIATED WITH DIABETES: ICD-10-CM

## 2025-06-27 DIAGNOSIS — G47.33 OBSTRUCTIVE SLEEP APNEA: Primary | ICD-10-CM

## 2025-06-27 DIAGNOSIS — I15.2 HYPERTENSION ASSOCIATED WITH DIABETES: ICD-10-CM

## 2025-06-27 PROCEDURE — 99999 PR PBB SHADOW E&M-EST. PATIENT-LVL IV: CPT | Mod: PBBFAC,HCNC,,

## 2025-06-27 NOTE — PROGRESS NOTES
Vivienne Jose is a 75 y.o. female seen today for a Sleep Apnea   follow-up.    Subjective      HPI: BAM  Symptoms:  []  Dry Mouth on Awakening []  Aerophagia/Air Hunger []  Mask Leaks/Discomfort []  Daytime Sleepiness [x]  Breakthrough Snoring [] Pressure Discomfort [] None  Current Treatments: [x] APAP [] BIPAP [] ASV [] Positional Therapy [] OA [] None  CPAP Adherence: [x] Uses > 4 hrs/night [] Uses < 4 hrs/night [] Nonadherent   Treatments Effects: [x] Improved Daytimes Sleepiness [x] Improved Sleep Continuity [] Unable to Tolerate [] Sleep Deterioration  BAM: [] Improving [] Worsening [] Unchanged [x] Stable  Comments: patient here with  who is providing some history, reports consistent nightly usage with dreamstation 2 and nasal pillows mask.  has heard breakthrough snoring with mask in place. Still having residual excessive daytime sleepiness despite consistent pap therapy. On ozempic for weight loss. Denies other issues with pap therapy, can't sleep without it.         8/23/2018     2:37 PM   Sleep Clinic New Patient   What time do you go to bed on a week day? (Give a range) 10 pm   What time do you go to bed on a day off? (Give a range) 10 pm   How long does it take you to fall asleep? (Give a range) 1 - 5 minutes   On average, how many times per night do you wake up? 3   How long does it take you to fall back into sleep? (Give a range) 1 - 5 minutes   What time do you wake up to start your day on a week day? (Give a range) 6 am   What time do you wake up to start your day on a day off? (Give a range) 9 am   What time do you get out of bed? (Give a range) 6 am - 10:30 am   On average, how many hours do you sleep? 7   On average, how many naps do you take per day? 0   Rate your sleep quality from 0 to 5 (0-poor, 5-great). 3   Have you experienced:  N/a   How much weight have you lost or gained (in lbs.) in the last year? 7 lbs   On average, how many times per night do you go to the bathroom?  1    Have you ever had a sleep study/CPAP machine/surgery for sleep apnea? Yes   Have you ever had a CPAP machine for sleep apnea? Yes   Have you ever had surgery for sleep apnea? No           6/23/2025    10:43 AM 11/1/2023     8:33 PM 8/23/2022     4:40 PM 7/6/2021     8:27 AM 7/27/2020     7:00 PM 12/10/2019     7:14 PM 9/22/2019     2:07 PM   EPWORTH SLEEPINESS SCALE TOTAL SCORE    Total score 14  13 11 16 13 16 11       Patient-reported     (Validated Sleepiness Questionnaire with higher score indicating greater sleepiness (0-24)           Objective     CPAP Data:    Interrogation: [employer] Dreamstation 2 : 8-20 cmH?O; (365 days): 100% usage; Avg Usage: 7.8 hrs; Avg Pressure: 16.5; Leak: 0 L/min; AHI: 5; met compliance.      DME: [facility], Machine: [employer] Dreamstation 2, Mask: P10, setup date: 12/24/2019    Records Reviewed:   Lab Results   Component Value Date    TSH 0.774 04/22/2025    CO2 29 05/27/2025    HGBA1C 7.4 (H) 04/22/2025    FERRITIN 38 11/25/2020      No echocardiogram results found for the past 12 months    PREVIOUS SLEEP STUDIES:      PSG/ SPLIT night study  In 2004 showed significant BAM with the AHI of 33.1/hour and SaO2 minimum of 88 %. Effective control of respiratory events was achieved at   10 cm H2O. Sleep efficiency was 79 %.  CPAP titration on 4/23/14: Adequate control of respiratory events was achieved at 12-13 cm of H2O. Weight 238 lbs. Frequent arousals were noted even when her respiratory events have been controlled.  CPAP titration on 9/19/18: Effective control of respiratory events was achieved at 12 cm of H2O, however SaO2 was high 80s-low 90s. Weight 241 lbs.       Physical Exam:  Vitals: Blood pressure 138/63, pulse 74, weight 105.1 kg (231 lb 11.2 oz), SpO2 95%.    Gen: Well Appearing, demonstrates insight  Skin: No rash or lesions on bridge of nose or mouth        Assessment & Plan  Obstructive sleep apnea  Benefiting from CPAP use in terms of sleep continuity and daytime  sleepiness. ESS: 14, AHI: 5  BAM Treatment: Continue APAP: 10-20  PAP Adjustments: Adjusted pressure: 8-20 to 10-20  New CPAP ordered   Discussed therapy report in detail, using graphs and charts  Patient is urged to avoid supine sleep, weight gain and alcoholic beverages since all of these can worsen BAM.    Precautions: The patient was advised to abstain from driving should he feel sleepy or drowsy.   Renewed Prescription for Supplies  Follow up: 31-90 days after receiving new machine.   Orders:    CPAP/BIPAP SUPPLIES    CPAP FOR HOME USE    Hypertension associated with diabetes  Follow up with pcp for continued management  Continue taking medications.

## 2025-06-27 NOTE — TELEPHONE ENCOUNTER
[facility]Population [facility] Chart Review & Patient Outreach Details For Medication Adherence Project    Reason for Outreach Encounter: 3rd Party payor non-compliance report ([employer], [employer], [employer], etc)  2.  Patient Outreach Method: Reviewed patient chart   3.   Medication in question:    Hyperlipidemia Medications              lovastatin (MEVACOR) 40 MG tablet TAKE 1 TABLET NIGHTLY (SUBSTITUTED FOR MEVACOR)                  lovastatin  last filled  6/24 for 90 day supply      4.  Reviewed and or Updates Made To: Patient Chart  5. Outreach Outcomes and/or actions taken: Patient filled medication and is on track to be adherent  Additional Notes:

## 2025-07-09 ENCOUNTER — OFFICE VISIT (OUTPATIENT)
Dept: PODIATRY | Facility: CLINIC | Age: 75
End: 2025-07-09
Payer: MEDICARE

## 2025-07-09 ENCOUNTER — HOSPITAL ENCOUNTER (OUTPATIENT)
Dept: RADIOLOGY | Facility: CLINIC | Age: 75
Discharge: HOME | End: 2025-07-09
Attending: STUDENT IN AN ORGANIZED HEALTH CARE EDUCATION/TRAINING PROGRAM
Payer: MEDICARE

## 2025-07-09 VITALS
HEIGHT: 62 IN | SYSTOLIC BLOOD PRESSURE: 137 MMHG | HEART RATE: 89 BPM | WEIGHT: 232.38 LBS | DIASTOLIC BLOOD PRESSURE: 64 MMHG | BODY MASS INDEX: 42.76 KG/M2

## 2025-07-09 DIAGNOSIS — M54.17 LUMBOSACRAL RADICULOPATHY: ICD-10-CM

## 2025-07-09 DIAGNOSIS — M81.0 AGE RELATED OSTEOPOROSIS, UNSPECIFIED PATHOLOGICAL FRACTURE PRESENCE: ICD-10-CM

## 2025-07-09 DIAGNOSIS — E11.40 PAINFUL DIABETIC NEUROPATHY: Primary | ICD-10-CM

## 2025-07-09 DIAGNOSIS — E11.42 TYPE 2 DIABETES MELLITUS WITH PERIPHERAL NEUROPATHY: ICD-10-CM

## 2025-07-09 PROCEDURE — 1101F PT FALLS ASSESS-DOCD LE1/YR: CPT | Mod: CPTII,S$GLB,, | Performed by: STUDENT IN AN ORGANIZED HEALTH CARE EDUCATION/TRAINING PROGRAM

## 2025-07-09 PROCEDURE — 3051F HG A1C>EQUAL 7.0%<8.0%: CPT | Mod: CPTII,S$GLB,, | Performed by: STUDENT IN AN ORGANIZED HEALTH CARE EDUCATION/TRAINING PROGRAM

## 2025-07-09 PROCEDURE — 3288F FALL RISK ASSESSMENT DOCD: CPT | Mod: CPTII,S$GLB,, | Performed by: STUDENT IN AN ORGANIZED HEALTH CARE EDUCATION/TRAINING PROGRAM

## 2025-07-09 PROCEDURE — 1125F AMNT PAIN NOTED PAIN PRSNT: CPT | Mod: CPTII,S$GLB,, | Performed by: STUDENT IN AN ORGANIZED HEALTH CARE EDUCATION/TRAINING PROGRAM

## 2025-07-09 PROCEDURE — 4010F ACE/ARB THERAPY RXD/TAKEN: CPT | Mod: CPTII,S$GLB,, | Performed by: STUDENT IN AN ORGANIZED HEALTH CARE EDUCATION/TRAINING PROGRAM

## 2025-07-09 PROCEDURE — 3075F SYST BP GE 130 - 139MM HG: CPT | Mod: CPTII,S$GLB,, | Performed by: STUDENT IN AN ORGANIZED HEALTH CARE EDUCATION/TRAINING PROGRAM

## 2025-07-09 PROCEDURE — 3061F NEG MICROALBUMINURIA REV: CPT | Mod: CPTII,S$GLB,, | Performed by: STUDENT IN AN ORGANIZED HEALTH CARE EDUCATION/TRAINING PROGRAM

## 2025-07-09 PROCEDURE — 3078F DIAST BP <80 MM HG: CPT | Mod: CPTII,S$GLB,, | Performed by: STUDENT IN AN ORGANIZED HEALTH CARE EDUCATION/TRAINING PROGRAM

## 2025-07-09 PROCEDURE — 77080 DXA BONE DENSITY AXIAL: CPT | Mod: TC,FY

## 2025-07-09 PROCEDURE — 3066F NEPHROPATHY DOC TX: CPT | Mod: CPTII,S$GLB,, | Performed by: STUDENT IN AN ORGANIZED HEALTH CARE EDUCATION/TRAINING PROGRAM

## 2025-07-09 PROCEDURE — 3008F BODY MASS INDEX DOCD: CPT | Mod: CPTII,S$GLB,, | Performed by: STUDENT IN AN ORGANIZED HEALTH CARE EDUCATION/TRAINING PROGRAM

## 2025-07-09 PROCEDURE — 99999 PR PBB SHADOW E&M-EST. PATIENT-LVL IV: CPT | Mod: PBBFAC,,, | Performed by: STUDENT IN AN ORGANIZED HEALTH CARE EDUCATION/TRAINING PROGRAM

## 2025-07-09 PROCEDURE — 1157F ADVNC CARE PLAN IN RCRD: CPT | Mod: CPTII,S$GLB,, | Performed by: STUDENT IN AN ORGANIZED HEALTH CARE EDUCATION/TRAINING PROGRAM

## 2025-07-09 PROCEDURE — 99214 OFFICE O/P EST MOD 30 MIN: CPT | Mod: S$GLB,,, | Performed by: STUDENT IN AN ORGANIZED HEALTH CARE EDUCATION/TRAINING PROGRAM

## 2025-07-09 PROCEDURE — 1159F MED LIST DOCD IN RCRD: CPT | Mod: CPTII,S$GLB,, | Performed by: STUDENT IN AN ORGANIZED HEALTH CARE EDUCATION/TRAINING PROGRAM

## 2025-07-09 RX ORDER — DULOXETIN HYDROCHLORIDE 60 MG/1
60 CAPSULE, DELAYED RELEASE ORAL 2 TIMES DAILY
Qty: 60 CAPSULE | Refills: 11 | Status: SHIPPED | OUTPATIENT
Start: 2025-07-09 | End: 2026-07-09

## 2025-07-09 NOTE — PROGRESS NOTES
Subjective:     Patient    Vivienne Jose is a 75 y.o. female.    Problem    01/09/24: Previously followed by Rebecca Ohara and Abhay, last seen 1 year ago. Had both 1st toenails removed years ago due to ingrown/fungal nails, still has occasional spicules that bother her. Has sensitivity throughout her 1st toes and feet; has numbness, tingling, burning, shooting, cramping. On gabapentin 1200 mg/day which does improve her nerve pain and does not make her sleepy that she can tell; previously tried pregabalin which made her bones hurt. Sleepy at baseline.      02/09/24: Started duloxetine 20 mg/day at last visit for severe BLE nerve pain/symptoms, with some improvement although it may have caused intention tremors in her hands and may be causing dry mouth; also had a couple other medication changes recently so she is unsure which medications may be causing which side effects. Also on gabapentin 1800 mg/day. Has some ongoing nerve symptoms.     04/23/24: Returns for followup of BLE nerve pain/symptoms. Qutenza patches approved. Has decreased gabapentin to 600 mg/day since starting the duloxetine with no worsening of symptoms, also now less sedated. Nerve symptoms are ongoing though.     07/30/24: Returns for Qutenza application.     11/08/24: Returns for Qutenza. Still having significant nerve symptoms and pain in both feet, numbness has improved some with Qutenza which she is happy about.     07/09/25: Returns for followup of painful diabetic neuropathy and radiculopathy. Qutenza was going to be expensive with insurance last time for some reason so she cancelled the appointment. Has ongoing pain, tingling, other sensations in feet not fully controlled with current medications. The last duloxetine increase did help without side effects. Also needs updated diabetic foot exam.     Primary Care Provider    Primary Care Provider: Nalini Hall MD   Last Seen: 4/22/2025     History    History obtained from patient  and review of medical records.     Past Medical History:   Diagnosis Date    Allergy     Angio-edema     Arthritis     Cataract     bilateral - not removed    Cerebrovascular malformation     Cirrhosis 11/24/2020    Colon polyps     Diabetes mellitus, type 2     Diabetic peripheral neuropathy     Edema of both feet 8/19/2022    GERD (gastroesophageal reflux disease)     Herpes infection     Hypothyroidism     Kidney stones     Mild nonproliferative diabetic retinopathy of 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 edema associated with type 2 diabetes mellitus 4/18/2019    DEL RIO (nonalcoholic steatohepatitis) 8/19/2022    Nausea & vomiting 11/23/2015    Obesity, morbid     Ovarian cyst     Seizure disorder, focal motor     Sleep apnea     Type II or unspecified type diabetes mellitus with neurological manifestations, uncontrolled(250.62)     Urticaria        Past Surgical History:   Procedure Laterality Date    CARPAL TUNNEL RELEASE Right 2014    CATARACT EXTRACTION W/  INTRAOCULAR LENS IMPLANT Right 9/19/2023    Procedure: EXTRACTION, CATARACT, WITH IOL INSERTION;  Surgeon: Lyndon Ortiz MD;  Location: [facility];  Service: Ophthalmology;  Laterality: Right;    CATARACT EXTRACTION W/  INTRAOCULAR LENS IMPLANT Left 10/3/2023    Procedure: EXTRACTION, CATARACT, WITH IOL INSERTION;  Surgeon: Lyndon Ortiz MD;  Location: [facility];  Service: Ophthalmology;  Laterality: Left;    COLONOSCOPY      COLONOSCOPY N/A 9/13/2017    Procedure: COLONOSCOPY Golytely;  Surgeon: Gisela Wall MD;  Location: [facility];  Service: Endoscopy;  Laterality: N/A;    COLONOSCOPY N/A 9/9/2022    Procedure: COLONOSCOPY Extended Suprep;  Surgeon: Britt Jasso MD;  Location: [facility];  Service: Endoscopy;  Laterality: N/A;    CYST REMOVAL      skin; multiples    ESOPHAGOGASTRODUODENOSCOPY Left 10/15/2021    Procedure: EGD (ESOPHAGOGASTRODUODENOSCOPY);  Surgeon: Luis Fernando Taveras MD;  [facility] ([address] [place] [address]);  Service:  Endoscopy;  Laterality: Left;  cirrhosis labwork am of procedure  last seizure 1973  COVID test on 10/12/21 at [facility]    ESOPHAGOGASTRODUODENOSCOPY N/A 3/10/2023    Procedure: EGD (ESOPHAGOGASTRODUODENOSCOPY);  Surgeon: Christa Caldera MD;  [facility]  Service: Endoscopy;  Laterality: N/A;  Medically Urgent  cirrohsis-labs done on 2/9/23 (< 90 days)  New onset A-fib  3/1 instructions to portal-st  Precall complete- [place]    ESOPHAGOGASTRODUODENOSCOPY N/A 4/17/2025    Procedure: EGD (ESOPHAGOGASTRODUODENOSCOPY);  Surgeon: Alec Gilbert MD;  Location: [facility] ENDO;  Service: Endoscopy;  Laterality: N/A;  ref by    Nell Christianson MD, portal,glp-1, cirrhosis labs completed-ae    EXTRACORPOREAL SHOCK WAVE LITHOTRIPSY  2002    HYSTERECTOMY  1984    INJECTION OF ANESTHETIC AGENT AROUND MEDIAL BRANCH NERVES INNERVATING LUMBAR FACET JOINT Bilateral 6/17/2025    Procedure: b/l L1-2 TFESI;  Surgeon: Myron Ocasio DO;  Location: [facility];  Service: Pain Management;  Laterality: Bilateral;  - RN sed - stop ASA take on her own - stop ozempic    INJECTION, EPIDURAL, SPINE, CERVICOTHORACIC, TRANSFORAMINAL APPROACH Bilateral 1/28/2025    Procedure: b/l L1-l2 TFESI;  Surgeon: Myron Ocasio DO;  Location: [facility];  Service: Pain Management;  Laterality: Bilateral;  ASA 5 days Ozempic 7 days    JOINT REPLACEMENT Right 03/06/2019    knee    KIDNEY STONE SURGERY      KNEE ARTHROPLASTY Right 3/6/2019    Procedure: ARTHROPLASTY, KNEE;  Surgeon: Pj Gamboa MD;  Location: [facility] OR;  Service: Orthopedics;  Laterality: Right;  Depuy (Stephen notified 2/21, CC)    KYPHOPLASTY, SPINE, LUMBAR Bilateral 9/23/2024    Procedure: T12 and L1 SpineJack (Lakia);  Surgeon: Myron Ocasio DO;  Location: [facility] OR;  Service: Pain Management;  Laterality: Bilateral;    THYROIDECTOMY  1977         TONSILLECTOMY, ADENOIDECTOMY      TRIGGER FINGER RELEASE      TUBAL LIGATION           Objective:     Vitals  Wt Readings from Last 1 Encounters:   25 105.4 kg (232 lb 5.8 oz)     Temp Readings from Last 1 Encounters:   25 98 °F (36.7 °C)     BP Readings from Last 1 Encounters:   25 137/64     Pulse Readings from Last 1 Encounters:   25 89       Dermatological Exam    Skin:  Pedal hair growth diminished and Pedal skin thin and shiny on right  Pedal hair growth diminished and Pedal skin thin and shiny on left     Nails:  10 nail(s) elongated    Vascular Exam    Arteries:  Posterior tibial artery palpable on right  Dorsalis pedis artery palpable on right  Posterior tibial artery palpable on left  Dorsalis pedis artery palpable on left    Veins:  Superficial veins unremarkable on right  Superficial veins unremarkable on left    Swellin+ nonpitting on right  1+ nonpitting on left    Neurological Exam    [place] touch test:  6/6 sites sensed, light touch intact     Musculoskeletal Exam    Footwear:  Casual on right  Casual on left    Gait Exam:   Ambulatory Status: Ambulatory  Gait: Normal  Assistive Devices: None    Foot Progression Angle:  Normal on right  Normal on left     Right Lower Extremity Additional Findings:  Right foot and ankle function, strength, and range of motion unremarkable except as noted above.     Left Lower Extremity Additional Findings:  Left foot and ankle function, strength, and range of motion unremarkable except as noted above.    Imaging and Other Tests    Imaging:  Independently reviewed and interpreted imaging, findings are as follows: N/A    Other Tests: The following A1c results were reviewed.   Hemoglobin A1C   Date Value Ref Range Status   2024 6.3 (H) 4.0 - 5.6 % Final     Comment:     ADA Screening Guidelines:  5.7-6.4%  Consistent with prediabetes  >or=6.5%  Consistent with diabetes    High levels of fetal hemoglobin interfere with the HbA1C  assay. Heterozygous hemoglobin variants (HbS, HgC, etc)do  not significantly  interfere with this assay.   However, presence of multiple variants may affect accuracy.     06/03/2024 7.5 (H) 4.0 - 5.6 % Final     Comment:     ADA Screening Guidelines:  5.7-6.4%  Consistent with prediabetes  >or=6.5%  Consistent with diabetes    High levels of fetal hemoglobin interfere with the HbA1C  assay. Heterozygous hemoglobin variants (HbS, HgC, etc)do  not significantly interfere with this assay.   However, presence of multiple variants may affect accuracy.     03/08/2024 7.2 (H) 4.0 - 5.6 % Final     Comment:     ADA Screening Guidelines:  5.7-6.4%  Consistent with prediabetes  >or=6.5%  Consistent with diabetes    High levels of fetal hemoglobin interfere with the HbA1C  assay. Heterozygous hemoglobin variants (HbS, HgC, etc)do  not significantly interfere with this assay.   However, presence of multiple variants may affect accuracy.       Hemoglobin A1c   Date Value Ref Range Status   04/22/2025 7.4 (H) 4.0 - 5.6 % Final     Comment:     ADA Screening Guidelines:  5.7-6.4%  Consistent with prediabetes  >=6.5%  Consistent with diabetes    High levels of fetal hemoglobin interfere with the HbA1C  assay. Heterozygous hemoglobin variants (HbS, HgC, etc)do  not significantly interfere with this assay.   However, presence of multiple variants may affect accuracy.         Assessment:     Encounter Diagnoses   Name Primary?    Painful diabetic neuropathy Yes    Type 2 diabetes mellitus with peripheral neuropathy     Lumbosacral radiculopathy             Plan:     I counseled the patient on her conditions, their implications and medical management.    Diabetic foot with peripheral neuropathy: chronic stable   -Diabetic foot exam performed.   -Performed shoe inspection and diabetic foot education. Reviewed importance of blood glucose control, proper nutrition, and foot hygiene to minimize risk of complications of diabetes. Recommended daily foot inspections, daily moisturizer to feet, avoiding sharp  instruments to feet, appropriate footwear at all times when ambulating, and following up regularly for routine foot care.   -Diabetic foot risk: 2023 IWGDF very low ulcer risk.   -Next foot exam due July 2026.  -Will return for routine foot care.     Painful diabetic neuropathy, radiculopathy: chronic stable  -Will hold off on Qutenza for now.   -Continue gabapentin 600-1200 mg/day.   -Increase duloxetine to 120 mg/night. Recent LFT/CMP unremarkable.          Return to clinic in 2-3 months for nerve pain, call sooner PRN.

## 2025-07-15 ENCOUNTER — OFFICE VISIT (OUTPATIENT)
Dept: PAIN MEDICINE | Facility: CLINIC | Age: 75
End: 2025-07-15
Payer: MEDICARE

## 2025-07-15 ENCOUNTER — TELEPHONE (OUTPATIENT)
Dept: PAIN MEDICINE | Facility: CLINIC | Age: 75
End: 2025-07-15

## 2025-07-15 VITALS
SYSTOLIC BLOOD PRESSURE: 127 MMHG | HEIGHT: 62 IN | WEIGHT: 230.19 LBS | HEART RATE: 76 BPM | BODY MASS INDEX: 42.36 KG/M2 | DIASTOLIC BLOOD PRESSURE: 77 MMHG

## 2025-07-15 DIAGNOSIS — M47.816 LUMBAR SPONDYLOSIS: Primary | ICD-10-CM

## 2025-07-15 DIAGNOSIS — M25.551 BILATERAL HIP PAIN: ICD-10-CM

## 2025-07-15 DIAGNOSIS — M25.552 BILATERAL HIP PAIN: ICD-10-CM

## 2025-07-15 PROCEDURE — 99999 PR PBB SHADOW E&M-EST. PATIENT-LVL IV: CPT | Mod: PBBFAC,,, | Performed by: STUDENT IN AN ORGANIZED HEALTH CARE EDUCATION/TRAINING PROGRAM

## 2025-07-15 NOTE — H&P (VIEW-ONLY)
Chronic Pain - f/u    Referring Physician: No ref. provider found    Date: 07/15/2025     Re: Vivienne Jose  MR#: [id number]  YOB: 1950  Age: 75 y.o.    Chief Complaint:   Chief Complaint   Patient presents with    Follow-up     **This note is dictated using the M*Modal Fluency Direct word recognition program. There are word recognition mistakes that are occasionally missed on review.**    ASSESSMENT: 75 y.o. year old female with lower back pain, consistent with     1. Lumbar spondylosis  Procedure Request Order for Pain Management      2. Bilateral hip pain  X-Ray Hips Bilateral 2 View Incl AP Pelvis        PLAN:     Bilateral hip pain (R>L)  -XR hip    Acute T12 and L1 osteoporotic compression fracture - improved  -The patient started getting severe back pain radiating to the groin after she bent to  her mom in early August.  At that time she was found to have new compression fractures at T12 and L1.  I suspect that the inflammation from the compression fracture is causing nerve root irritation which is causing the radicular component that we are seeing in the T12 and L1 distribution.  9/23/24 - T12 and L1 Spinejack - gen sed - ASA81 - stop Rybelsus - needs post-op meds - f/u on 9/27 - cleared to stop aspirin - excellent height restoration of vertebral body. No extravasation. - moderate improvement at 1.5m then a bit worse again at 3m.  -MRI results reviewed. No new fractures    Lumbar radiculopathy from T12 or L1 nerve root irritation  -suspect that this is causing the pain wrapping around to abdomen. This has resolved since the injection  1/28/25 - b/l T12-L1 TFESI - RN sed - stop ASA - stop ozempic - really good improvement but then got worse again after she fell out of the bed  6/17/25 - b/l L1-2 TFESI - RN sed (1&50) - stop ASA - stop ozempic - 80%@1m  -tramadol #20 for severe pain only - she still has this  -Stopped Tizanidine 4-8mg qhs for 2 weeks then PRN - she still has  "this    Osteoporosis   -continue with Papi Olivera for osteoporosis treatment  -On Tymlos    Lumbar spondylosis  -severe facet arthropathy at bilateral L4-5 and L5-S1  -I suspect this was responsible for her chronic axial low back pain prior to her accident.  7/29/25 - b/l L3,4,5 MBB#1 (5" needle) - no sed - ASA ok  8/12/25 - b/l L3,4,5 MBB#2 (5" needle) - no sed - ASA ok  8/26/25 - L3,4,5 RFA (145mm) - RN sed - ASA ok    - RTC after RFA  - Counseled patient regarding the importance of weight loss and activity modification and physical therapy.    The above plan and management options were discussed at length with patient. Patient is in agreement with the above and verbalized understanding. It will be communicated with the referring physician via electronic record, fax, or mail.  Lab/study reports reviewed were important and necessary because subsequent medical and treatment recommendations required review of the above lab/study reports. Images viewed/reviewed above were important and necessary because subsequent medical and treatment recommendations required review of the reviewed image(s).     Electronically signed by:  Myron Ocasio DO  07/15/2025    =========================================================================================================    SUBJECTIVE:    Interval History 7/15/2025:   Vivienne Jose is a 75 y.o. female presents to the clinic for follow up.  Since last visit the pain has has improved. Upper low back pain is much better after the ELANA. Pain is mostly in the lower back and the bilateral outer hips    The pain is located in the mid back area and does not radiate.  The pain is described as aching, numbing, tight band, and locking.    At BEST  1/10   At WORST  5/10 on the WORST day.    On average pain is rated as 3/10.   Today the pain is rated as 1/10  Symptoms interfere with daily activity and work.   Exacerbating factors: Morning, Lifting, and Getting out of bed/chair.  " "  Mitigating factors sitting.     Current pain medications: duloxetine 20mg, Tymlos for osteoporosis.  Failed Pain Medications: prednisone, cannot take NSAIDs,    Stopped because pain better: Hydrocodone (rare), tizanidine, gabapentin,     Pain procedures:  9/23/24 - T12 and L1 Spinejack - gen sed - ASA81 - stop Rybelsus - needs post-op meds - f/u on 9/27 - cleared to stop aspirin - some impropvement @POD#4 - better at 1.5m w/Esther  1/28/25 - b/l T12-L1 TFESI - RN sed - stop ASA - stop ozempic - 80%x5m  6/17/25 - b/l L1-2 TFESI - RN sed (1&50) - stop ASA - stop ozempic- 80%@1m  7/29/25 - b/l L3,4,5 MBB#1 (5" needle) - no sed - ASA ok  8/12/25 - b/l L3,4,5 MBB#2 (5" needle) - no sed - ASA ok  8/26/25 - L3,4,5 RFA (145mm) - RN sed - ASA ok    Interval History 6/5/2025:   Vivienne Jose is a 75 y.o. female presents to the clinic for follow up.  Since last visit the pain has has worsened. The patient states that over the last few weeks she aggravated something in her back.  She she sneezes. She she climbs up steps on the left side she gets pain. Sitting is okay.  She took a tramadol and tizanidine over the weekend because the pain was so bad.    The pain is located in the mid lower back area and radiates to the lower back.  The pain is described as aching, tight band, and pressure, "pulling"    At BEST  3/10   At WORST  8/10 on the WORST day.    On average pain is rated as 4/10.   Today the pain is rated as 4/10  Symptoms interfere with daily activity.   Exacerbating factors: Walking.    Mitigating factors medications and Voltaren.       Interval History 3/5/2025:   Vivienne Jose is a 75 y.o. female presents to the clinic for follow up.  Since last visit the pain has has worsened slightly. Patient states that she was doing okay and then she fell out of bed last week (for the third time).  She has some bruises in her arm and her cheek.  The injection on 1/28/25 did help a lot until the fall.  She still gets a strong " "muscle spasm in the back when she goes to bed.     The pain is located in the mid lower back area and radiates to the lower back.  The pain is described as aching and "pressure"    At BEST  0/10   At WORST  10/10 on the WORST day.    On average pain is rated as 5/10.   Today the pain is rated as 0/10  Symptoms interfere with daily activity.   Exacerbating factors: Sitting, Standing, Laying, and Walking.    Mitigating factors medications.     Interval History 12/20/2024:   Vivienne Jose is a 75 y.o. female presents to the clinic for follow up.  Since last visit the pain has has worsened.  The pain is located in the lower back area and radiates to the bilateral sides.  The pain is described as aching and pressure   The patient states that her back pain is located in the low/mid low back.  She states that it was getting better to where she didn't feel it but then all of a sudden it started getting worse again.  She denies any inciting event when it started getting worse again.  The pain is worse with laying down.  Leaning to the side starts hurting.  The pain is not the same level as prior from the compression fracture.      At BEST  5/10   At WORST  5/10 on the WORST day.    On average pain is rated as 5/10.   Today the pain is rated as 5/10  Symptoms interfere with daily activity.   Exacerbating factors: Sitting, Standing, Laying, and Walking.    Mitigating factors nothing.     Interval History 9/27/2024:   Vivienne Jose is a 75 y.o. female presents to the clinic for follow up.  Since last visit the pain has has slightly improved.  The patient is s/p T12/L1 spinejack on 9/23 and the abdominal and back pain is improving. The muscle spasms have eased up a little.    The pain is located in the lower back area and radiates to the right side.  The pain is described as aching    At BEST  5/10   At WORST  8/10 on the WORST day.    On average pain is rated as 5/10.   Today the pain is rated as 3/10  Symptoms interfere with " "daily activity.   Exacerbating factors: Sitting, Standing, Laying, and Walking.    Mitigating factors medications.     Initial hx:  Vivienne Jose is a 75 y.o. female presents to the clinic for the evaluation of lower mid back  pain. The pain started years ago following no inciting event and symptoms have been worsening.  The patient states that she was trying to  her mother in the beginning of August and she felt a "snap" and then went to the ED and was found to have acute compression fracture at T12 and L1.  The back pain has gotten much worse since it started.    Her pain is actually located predominantly in the low back.  She does not have too much pain in the upper low back.  The pain seems to start in the low back and will wrap around to the abdomen.  This has gotten worse since the compression fracture.      Pain Description:    The pain is located in the lower back area and radiates to the stomach.    At BEST  2/10   At WORST  10/10 on the WORST day.    On average pain is rated as 5/10.   Today the pain is rated as 5/10  The pain is continuous.  The pain is described as aching and shooting.    Symptoms interfere with daily activity and sleeping.   Exacerbating factors: Sitting, Standing, Laying, Bending, Lifting, and Getting out of bed/chair.    Mitigating factors medications.   She reports six hours of sleep per night.    Physical Therapy/Home Exercise: No, not currently in physical therapy or home exercise program    Current Pain Medications:    - calcitonin, tizanidine, gabapentin, duloxetine 20mg    Failed Pain Medications:    - prednisone, cannot take NSAIDs    Pain Treatment Therapies:    Pain procedures: none  Physical Therapy: yes  Chiropractor: none  Acupuncture: none  TENS unit: none  Spinal decompression: none  Joint replacement: R TKA    Patient denies urinary incontinence and bowel incontinence and weakness  Patient denies any suicidal or homicidal ideations     report:  Reviewed and " consistent with medication use as prescribed.    Imaging:   MRI Lumbar 09/2024:  FINDINGS:  Alignment: Grade 1 anterolisthesis of L4 on L5.     Vertebrae: Compression deformity of the T12 vertebral body with approximately 30% height loss and 5 mm of associated retropulsion.  Additional mild compression deformity of the L1 vertebral body with minimal retropulsion eccentric to the right.  Extensive marrow edema throughout the T12 vertebral body and to a lesser extent L1 vertebral body.  Mild edema of the prevertebral soft tissues.  Trace fluid in the T11-T12 disc space, likely post-traumatic.  L1 vertebral body hemangioma.     Discs: Mild disc height loss of T11-T12, T12-L1 and L4-L5. no evidence for discitis.     Cord: No cord signal abnormality or evidence of arachnoiditis.  Conus terminates at L1-L2.     Degenerative findings:     T11-T12: Fracture level with 5 mm of retropulsion of the superior endplate of T12 with mild spinal canal stenosis.     T12-L1: Fracture level with minimal retropulsion eccentric to the right resulting in mild spinal canal stenosis.     L1-L2: Circumferential disc bulge and mild facet arthropathy.  No spinal canal stenosis or neural foraminal narrowing.     L2-L3: Circumferential disc bulge with right paracentral protrusion and mild facet arthropathy.  No spinal canal stenosis or neural foraminal narrowing.     L3-L4: Mild facet arthropathy.  No spinal canal stenosis or neural foraminal narrowing.     L4-L5: Uncovering of the intervertebral disc with circumferential bulge and severe facet arthropathy result in mild spinal canal stenosis with moderate effacement of the right lateral recess as well as moderate right, mild left neural foraminal narrowing.  Trace bilateral facet effusions noted.     L5-S1: Moderate facet arthropathy.  No spinal canal stenosis or neural foraminal narrowing.     Paraspinal muscles & soft tissues: Bilateral simple renal cysts.  Moderate atrophy of the paraspinal  muscles.        6/5/2025     8:56 AM 3/5/2025     2:40 PM 12/20/2024     2:40 PM 9/5/2024    11:18 AM 6/9/2015     1:57 PM   Pain Disability Index (PDI)   Family/Home Responsibilities: 4 5 5 5 6   Recreation: 4 5 5 5 4   Occupation: 4 5 5 5 3   Sexual Behavior: 4 5 5 5 0   Self Care: 4 5 5 5 0   Life-Support Activities: 4 5 5 5 0   Pain Disability Index (PDI) 28 35 35 35 14        Past Medical History:   Diagnosis Date    Allergy     Angio-edema     Arthritis     Cataract     bilateral - not removed    Cerebrovascular malformation     Cirrhosis 11/24/2020    Colon polyps     Diabetes mellitus, type 2     Diabetic peripheral neuropathy     Edema of both feet 8/19/2022    GERD (gastroesophageal reflux disease)     Herpes infection     Hypothyroidism     Kidney stones     Mild nonproliferative diabetic retinopathy of 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 edema associated with type 2 diabetes mellitus 4/18/2019    DEL RIO (nonalcoholic steatohepatitis) 8/19/2022    Nausea & vomiting 11/23/2015    Obesity, morbid     Ovarian cyst     Seizure disorder, focal motor     Sleep apnea     Type II or unspecified type diabetes mellitus with neurological manifestations, uncontrolled(250.62)     Urticaria      Past Surgical History:   Procedure Laterality Date    CARPAL TUNNEL RELEASE Right 2014    CATARACT EXTRACTION W/  INTRAOCULAR LENS IMPLANT Right 9/19/2023    Procedure: EXTRACTION, CATARACT, WITH IOL INSERTION;  Surgeon: Lyndon Ortiz MD;  Location: [facility] OR;  Service: Ophthalmology;  Laterality: Right;    CATARACT EXTRACTION W/  INTRAOCULAR LENS IMPLANT Left 10/3/2023    Procedure: EXTRACTION, CATARACT, WITH IOL INSERTION;  Surgeon: Lyndon Ortiz MD;  Location: [facility] OR;  Service: Ophthalmology;  Laterality: Left;    COLONOSCOPY      COLONOSCOPY N/A 9/13/2017    Procedure: COLONOSCOPY Golytely;  Surgeon: Gisela Wall MD;  Location: [facility];  Service: Endoscopy;  Laterality: N/A;    COLONOSCOPY N/A 9/9/2022     Procedure: COLONOSCOPY Extended Suprep;  Surgeon: Britt Jasso MD;  Location: [facility];  Service: Endoscopy;  Laterality: N/A;    CYST REMOVAL      skin; multiples    ESOPHAGOGASTRODUODENOSCOPY Left 10/15/2021    Procedure: EGD (ESOPHAGOGASTRODUODENOSCOPY);  Surgeon: Luis Fernando Taveras MD;  Location: [facility] (4TH FLR);  Service: Endoscopy;  Laterality: Left;  cirrhosis labwork am of procedure  last seizure 1973  COVID test on 10/12/21 at [facility]    ESOPHAGOGASTRODUODENOSCOPY N/A 3/10/2023    Procedure: EGD (ESOPHAGOGASTRODUODENOSCOPY);  Surgeon: Christa Caldera MD;  Location: [facility] (2ND FLR);  Service: Endoscopy;  Laterality: N/A;  Medically Urgent  cirrohsis-labs done on 2/9/23 (< 90 days)  New onset A-fib  3/1 instructions to portal-st  Precall complete- [place]    ESOPHAGOGASTRODUODENOSCOPY N/A 4/17/2025    Procedure: EGD (ESOPHAGOGASTRODUODENOSCOPY);  Surgeon: Alec Gilbert MD;  Location: [facility];  Service: Endoscopy;  Laterality: N/A;  ref by    Nell Christianson MD, portal,glp-1, cirrhosis labs completed-ae    EXTRACORPOREAL SHOCK WAVE LITHOTRIPSY  2002    HYSTERECTOMY  1984    INJECTION OF ANESTHETIC AGENT AROUND MEDIAL BRANCH NERVES INNERVATING LUMBAR FACET JOINT Bilateral 6/17/2025    Procedure: b/l L1-2 TFESI;  Surgeon: Myron Ocasio DO;  Location: [facility];  Service: Pain Management;  Laterality: Bilateral;  - RN sed - stop ASA take on her own - stop ozempic    INJECTION, EPIDURAL, SPINE, CERVICOTHORACIC, TRANSFORAMINAL APPROACH Bilateral 1/28/2025    Procedure: b/l L1-l2 TFESI;  Surgeon: Myron Ocasio DO;  Location: [facility];  Service: Pain Management;  Laterality: Bilateral;  ASA 5 days Ozempic 7 days    JOINT REPLACEMENT Right 03/06/2019    knee    KIDNEY STONE SURGERY      KNEE ARTHROPLASTY Right 3/6/2019    Procedure: ARTHROPLASTY, KNEE;  Surgeon: Pj Gamboa MD;  Location: [facility];  Service: Orthopedics;  Laterality: Right;  Depuy  (Stephen notified 2/21, CC)    KYPHOPLASTY, SPINE, LUMBAR Bilateral 9/23/2024    Procedure: T12 and L1 SpineJack (Peculiar);  Surgeon: Myron Ocasio DO;  Location: [facility] OR;  Service: Pain Management;  Laterality: Bilateral;    THYROIDECTOMY  1977         TONSILLECTOMY, ADENOIDECTOMY      TRIGGER FINGER RELEASE      TUBAL LIGATION       Social History     Socioeconomic History    Marital status:    Occupational History    Occupation: retired     Employer: formerly [employer]   Tobacco Use    Smoking status: Never     Passive exposure: Never    Smokeless tobacco: Never   Substance and Sexual Activity    Alcohol use: No     Alcohol/week: 0.0 standard drinks of alcohol    Drug use: No    Sexual activity: Yes     Partners: Male     Social Drivers of Health     Financial Resource Strain: Low Risk  (1/17/2025)    Overall Financial Resource Strain (CARDIA)     Difficulty of Paying Living Expenses: Not hard at all   Food Insecurity: No Food Insecurity (1/17/2025)    Hunger Vital Sign     Worried About Running Out of Food in the Last Year: Never true     Ran Out of Food in the Last Year: Never true   Transportation Needs: No Transportation Needs (8/9/2024)    PRAPARE - Transportation     Lack of Transportation (Medical): No     Lack of Transportation (Non-Medical): No   Physical Activity: Inactive (1/17/2025)    Exercise Vital Sign     Days of Exercise per Week: 0 days     Minutes of Exercise per Session: 0 min   Stress: No Stress Concern Present (1/17/2025)    [employer] [place] [employer] - Occupational Stress Questionnaire     Feeling of Stress : Only a little   Housing Stability: Unknown (1/17/2025)    Housing Stability Vital Sign     Unable to Pay for Housing in the Last Year: No     Homeless in the Last Year: No     Family History   Problem Relation Name Age of Onset    Skin cancer Mother      Macular degeneration Mother      Dementia Mother      Hypertension Mother      Cancer Father       Arthritis Father      Gout Father      No Known Problems Daughter Madisyn     Diabetes Daughter Rudy     Hypertension Daughter Rudy     Arthritis Daughter Arianna     Allergies Son Lnydon     Allergic rhinitis Son Lyndon     Glaucoma Maternal Aunt          Great Maternal Aunt    Leukemia Maternal Uncle      Amblyopia Neg Hx      Cataracts Neg Hx      Retinal detachment Neg Hx      Strabismus Neg Hx      Stroke Neg Hx      Thyroid disease Neg Hx      Kidney disease Neg Hx      Angioedema Neg Hx      Asthma Neg Hx      Atopy Neg Hx      Eczema Neg Hx      Immunodeficiency Neg Hx      Rhinitis Neg Hx      Urticaria Neg Hx         Review of patient's allergies indicates:   Allergen Reactions    Sulfa (sulfonamide antibiotics) Nausea Only    Ciprofloxacin     Januvia [sitagliptin]      abd pain    Lisinopril     Lotensin [benazepril]     Nexium [esomeprazole magnesium] Other (See Comments)     Gas       Current Outpatient Medications   Medication Sig    abaloparatide (TYMLOS) 80 mcg (3,120 mcg/1.56 mL) PnIj Inject 0.04 mLs (80 mcg total) into the skin once daily.    ascorbic acid, vitamin C, (VITAMIN C) 100 MG tablet Take 500 mg by mouth 2 (two) times daily.     aspirin (ECOTRIN) 81 MG EC tablet Take 81 mg by mouth once daily.    betamethasone dipropionate (DIPROLENE) 0.05 % ointment Apply topically 2 (two) times daily. Use to affected areas for up to 2 weeks then take a 1 week break or decrease to 3 times weekly. Do not apply to groin or face. Use to spot on ear when flaring    blood sugar diagnostic (TRUE METRIX GLUCOSE TEST STRIP) Strp TEST TWO TIMES DAILY    blood-glucose meter Misc [employer] True Metrix Air meter    DULoxetine (CYMBALTA) 60 MG capsule Take 1 capsule (60 mg total) by mouth 2 (two) times daily.    estradioL (ESTRING) 2 mg (7.5 mcg /24 hour) vaginal ring Remove old Estring, place the new one every 3 months.    fluticasone propionate (FLONASE) 50 mcg/actuation nasal spray 2 sprays (100 mcg total) by  "Each Nostril route once daily.    furosemide (LASIX) 20 MG tablet Take 1 tablet (20 mg total) by mouth once daily.    gabapentin (NEURONTIN) 600 MG tablet TAKE 1 TABLET THREE TIMES DAILY    glipiZIDE (GLUCOTROL) 5 MG tablet TAKE 1 TABLET TWICE DAILY BEFORE MEALS    ipratropium (ATROVENT) 42 mcg (0.06 %) nasal spray 2 sprays by Each Nostril route 4 (four) times daily.    ketotifen (ZADITOR) 0.025 % (0.035 %) ophthalmic solution Place 1-2 drops into both eyes once daily.    lactulose (CHRONULAC) 20 gram/30 mL Soln Take 30 mLs (20 g total) by mouth 2 (two) times daily as needed (titrate to have 2-3 bowl movements per day).    lancets (TRUEPLUS LANCETS) 28 gauge Misc Inject 1 lancet into the skin 2 (two) times daily before meals.    levothyroxine (SYNTHROID) 75 MCG tablet Take 1 tablet (75 mcg total) by mouth once daily.    loratadine (CLARITIN) 10 mg tablet Take 10 mg by mouth once daily.    lovastatin (MEVACOR) 40 MG tablet TAKE 1 TABLET NIGHTLY (SUBSTITUTED FOR MEVACOR)    metFORMIN (GLUCOPHAGE) 1000 MG tablet TAKE 1 TABLET TWICE DAILY WITH MEALS    mv-mn/folic acid/vit [id number] (ALIVE ONCE DAILY WOMEN 50 PLUS ORAL) Take 1 tablet by mouth once daily.    omeprazole (PRILOSEC) 40 MG capsule Take 1 capsule (40 mg total) by mouth 2 (two) times a day.    pen needle, diabetic (BD ULTRA-FINE EDUARDO PEN NEEDLE) 32 gauge x 5/32" Ndle Inject 1 Needle into the skin once daily.    semaglutide (OZEMPIC) 1 mg/dose (4 mg/3 mL) Inject 1 mg into the skin every 7 days.    spironolactone (ALDACTONE) 50 MG tablet TAKE 1 TABLET EVERY OTHER DAY    TRESIBA FLEXTOUCH U-100 100 unit/mL (3 mL) insulin pen Inject 25 Units into the skin once daily.    UNABLE TO FIND Take 1 tablet by mouth 2 (two) times a day. medication name: Magnesium 400mg, Calcium 1000 mg, D3 15mcg, Zinc 15mg    valACYclovir (VALTREX) 500 MG tablet Take 1 tablet (500 mg total) by mouth once daily.    valsartan (DIOVAN) 160 MG tablet Take 1 tablet (160 mg total) by mouth once " "daily.    azelastine (ASTELIN) 137 mcg (0.1 %) nasal spray 2 sprays (274 mcg total) by Nasal route 2 (two) times daily.     Current Facility-Administered Medications   Medication    capsaicin-skin cleanser patch 4 patch       REVIEW OF SYSTEMS:    GENERAL:  No weight loss, malaise or fevers.  HEENT:   No recent changes in vision or hearing  NECK:  Negative for lumps, no difficulty with swallowing.  RESPIRATORY:  Negative for cough, wheezing or shortness of breath, patient denies any recent URI.  CARDIOVASCULAR:  Negative for chest pain, leg swelling or palpitations.  GI:  Negative for abdominal discomfort, blood in stools or black stools or change in bowel habits.  MUSCULOSKELETAL:  See HPI.  SKIN:  Negative for lesions, rash, and itching.  PSYCH:  No mood disorder or recent psychosocial stressors.  Patients sleep is not disturbed secondary to pain.  HEMATOLOGY/LYMPHOLOGY:  Negative for prolonged bleeding, bruising easily or swollen nodes.  Patient is not currently taking any anti-coagulants  NEURO:   No history of headaches, syncope, paralysis, seizures or tremors.  All other reviewed and negative other than HPI.    OBJECTIVE:    /77 (BP Location: Left arm, Patient Position: Sitting)   Pulse 76   Ht 5' 2" (1.575 m)   Wt 104.4 kg (230 lb 2.6 oz)   LMP  (LMP Unknown)   BMI 42.10 kg/m²     PHYSICAL EXAMINATION:    GENERAL: Well appearing, in no acute distress, alert and oriented x3.  PSYCH:  Mood and affect appropriate.  SKIN: Skin color, texture, turgor normal, no rashes or lesions.  HEAD/FACE:  Normocephalic, atraumatic. Cranial nerves grossly intact.  CV: RRR with palpation of the radial artery.  PULM: CTAB. No evidence of respiratory difficulty, symmetric chest rise.  GI:  Soft and non-tender. Obese    BACK:  - No obvious deformity or signs of trauma, Normal lumbar lordotic curve  - Negative spinous process tenderness  - Positive paravertebral tenderness  - Positive pain to palpation over the facet " joints of the lumbar spine.   - Negative QL / Iliac crest / Glut tenderness  - Slump test is Negative for radicular pain  - Slump test is Positive for back pain  - Supine Straight leg raising is Did not perform for radicular pain  - Supine Straight leg raising is Did not perform for back pain  - Lumbar ROM is diminished in Flexion without pain  - Lumbar ROM is diminished in Extension with pain  - Lumbar ROM is diminished in Lateral Flexion with pain  - Did not perform Sustained Hip Flexion test (for discogenic pain)  - Positive Altered Gait, Posture  - Axial facet loading test Positive on the bilateral side(s)    SI Joint exam:  - Negative SI joint tenderness to palpation  - Seven's sign Did not perform  - Yeoman's Test: Did not perform for SI joint pain indicating anterior SI ligament involvement. Did not perform for anterior thigh pain/paresthesia which indicates femoral nerve stretch.  - Gaenslen's Test:Negative  - Finger Josephine's Sign:Negative  - SI compression test:Did not perform  - SI distraction test:Did not perform  - Thigh Thrust: Negative  - SI Thrust: Did not perform    MUSKULOSKELETAL:    EXTREMITIES:   Hip Exam:  - Log Roll Negative  - FADIR Positive  - Stinchfield Positive  - Hip Scour Negative  - GTB Tenderness Negative    NEUROLOGICAL EXAM:  MENTAL STATUS: A x O x 3, good concentration, speech is fluent and goal directed  MEMORY: recent and remote are intact  CN: CN2-12 grossly intact  MOTOR: 5/5 in all muscle groups of LE  DTRs: 0+ intact symmetric

## 2025-07-15 NOTE — TELEPHONE ENCOUNTER
"----- Message from Myron Guerra DO sent at 7/15/2025  4:06 PM CDT -----  Regarding: Order for HARVEY KING    Patient Name: HARVEY KING([id number])  Sex: Female  : 1950      PCP: [facility]    Center: [facility]     Types of orders made on 07/15/2025: Imaging, Procedure Request    Order Date:7/15/2025  Ordering User:MYRON GUERRA [[id number]]  Encounter Provider:Myron Guerra DO [[id number]]  Authorizing Provider: Myron Guerra DO [[id number]]  Department:[facility][[id number]]    Common Order Information  Procedure -> Medial Branch Block (Specify level and laterality)     Pre-op Diagnosis -> Lumbar spondylosis     Order Specific Information  Order: Procedure Request Order for Pain Management [Custom: [id number]]  Order #:          [id number]: [id number] FUTURE    Priority: Routine  Class: Clinic Performed    Future Order Information      Expires on:07/15/2026            Expected by:07/15/2025                   Comment:25 - b/l L3,4,5 MBB#1 (5" needle) - no sed - ASA ok             25 - b/l L3,4,5 MBB#2 (5" needle) - no sed - ASA ok             25 - L3,4,5 RFA (145mm) - RN sed - ASA ok    Associated Diagnoses      M47.816 Lumbar spondylosis      Physician -> rafaela         Is patient on anti-coagulants? -> No Cmt: ASA ok        Facility Name: -> [facility]           Priority: Routine  Class: Clinic Performed    Future Order Information      Expires on:07/15/2026            Expected by:07/15/2025                   Comment:25 - b/l L3,4,5 MBB#1 (5" needle) - no sed - ASA ok             25 - b/l L3,4,5 MBB#2 (5" needle) - no sed - ASA ok             25 - L3,4,5 RFA (145mm) - RN sed - ASA ok    Associated Diagnoses      M47.816 Lumbar spondylosis      Procedure -> Medial Branch Block (Specify level and laterality)         Physician -> rafaela         Is patient on anti-coagulants? -> No Cmt: ASA ok        Pre-op Diagnosis -> Lumbar " spondylosis         Facility Name: -> [facility]

## 2025-07-15 NOTE — PROGRESS NOTES
Chronic Pain - f/u    Referring Physician: No ref. provider found    Date: 07/15/2025     Re: Vivienne Jose  MR#: [id number]  YOB: 1950  Age: 75 y.o.    Chief Complaint:   Chief Complaint   Patient presents with    Follow-up     **This note is dictated using the M*Modal Fluency Direct word recognition program. There are word recognition mistakes that are occasionally missed on review.**    ASSESSMENT: 75 y.o. year old female with lower back pain, consistent with     1. Lumbar spondylosis  Procedure Request Order for Pain Management      2. Bilateral hip pain  X-Ray Hips Bilateral 2 View Incl AP Pelvis        PLAN:     Bilateral hip pain (R>L)  -XR hip    Acute T12 and L1 osteoporotic compression fracture - improved  -The patient started getting severe back pain radiating to the groin after she bent to  her mom in early August.  At that time she was found to have new compression fractures at T12 and L1.  I suspect that the inflammation from the compression fracture is causing nerve root irritation which is causing the radicular component that we are seeing in the T12 and L1 distribution.  9/23/24 - T12 and L1 Spinejack - gen sed - ASA81 - stop Rybelsus - needs post-op meds - f/u on 9/27 - cleared to stop aspirin - excellent height restoration of vertebral body. No extravasation. - moderate improvement at 1.5m then a bit worse again at 3m.  -MRI results reviewed. No new fractures    Lumbar radiculopathy from T12 or L1 nerve root irritation  -suspect that this is causing the pain wrapping around to abdomen. This has resolved since the injection  1/28/25 - b/l T12-L1 TFESI - RN sed - stop ASA - stop ozempic - really good improvement but then got worse again after she fell out of the bed  6/17/25 - b/l L1-2 TFESI - RN sed (1&50) - stop ASA - stop ozempic - 80%@1m  -tramadol #20 for severe pain only - she still has this  -Stopped Tizanidine 4-8mg qhs for 2 weeks then PRN - she still has  "this    Osteoporosis   -continue with Papi Olivera for osteoporosis treatment  -On Tymlos    Lumbar spondylosis  -severe facet arthropathy at bilateral L4-5 and L5-S1  -I suspect this was responsible for her chronic axial low back pain prior to her accident.  7/29/25 - b/l L3,4,5 MBB#1 (5" needle) - no sed - ASA ok  8/12/25 - b/l L3,4,5 MBB#2 (5" needle) - no sed - ASA ok  8/26/25 - L3,4,5 RFA (145mm) - RN sed - ASA ok    - RTC after RFA  - Counseled patient regarding the importance of weight loss and activity modification and physical therapy.    The above plan and management options were discussed at length with patient. Patient is in agreement with the above and verbalized understanding. It will be communicated with the referring physician via electronic record, fax, or mail.  Lab/study reports reviewed were important and necessary because subsequent medical and treatment recommendations required review of the above lab/study reports. Images viewed/reviewed above were important and necessary because subsequent medical and treatment recommendations required review of the reviewed image(s).     Electronically signed by:  Myron Ocasio DO  07/15/2025    =========================================================================================================    SUBJECTIVE:    Interval History 7/15/2025:   Vivienne Jose is a 75 y.o. female presents to the clinic for follow up.  Since last visit the pain has has improved. Upper low back pain is much better after the ELANA. Pain is mostly in the lower back and the bilateral outer hips    The pain is located in the mid back area and does not radiate.  The pain is described as aching, numbing, tight band, and locking.    At BEST  1/10   At WORST  5/10 on the WORST day.    On average pain is rated as 3/10.   Today the pain is rated as 1/10  Symptoms interfere with daily activity and work.   Exacerbating factors: Morning, Lifting, and Getting out of bed/chair.  " "  Mitigating factors sitting.     Current pain medications: duloxetine 20mg, Tymlos for osteoporosis.  Failed Pain Medications: prednisone, cannot take NSAIDs,    Stopped because pain better: Hydrocodone (rare), tizanidine, gabapentin,     Pain procedures:  9/23/24 - T12 and L1 Spinejack - gen sed - ASA81 - stop Rybelsus - needs post-op meds - f/u on 9/27 - cleared to stop aspirin - some impropvement @POD#4 - better at 1.5m w/Esther  1/28/25 - b/l T12-L1 TFESI - RN sed - stop ASA - stop ozempic - 80%x5m  6/17/25 - b/l L1-2 TFESI - RN sed (1&50) - stop ASA - stop ozempic- 80%@1m  7/29/25 - b/l L3,4,5 MBB#1 (5" needle) - no sed - ASA ok  8/12/25 - b/l L3,4,5 MBB#2 (5" needle) - no sed - ASA ok  8/26/25 - L3,4,5 RFA (145mm) - RN sed - ASA ok    Interval History 6/5/2025:   Vivienne Jose is a 75 y.o. female presents to the clinic for follow up.  Since last visit the pain has has worsened. The patient states that over the last few weeks she aggravated something in her back.  She she sneezes. She she climbs up steps on the left side she gets pain. Sitting is okay.  She took a tramadol and tizanidine over the weekend because the pain was so bad.    The pain is located in the mid lower back area and radiates to the lower back.  The pain is described as aching, tight band, and pressure, "pulling"    At BEST  3/10   At WORST  8/10 on the WORST day.    On average pain is rated as 4/10.   Today the pain is rated as 4/10  Symptoms interfere with daily activity.   Exacerbating factors: Walking.    Mitigating factors medications and Voltaren.       Interval History 3/5/2025:   Vivienne Jose is a 75 y.o. female presents to the clinic for follow up.  Since last visit the pain has has worsened slightly. Patient states that she was doing okay and then she fell out of bed last week (for the third time).  She has some bruises in her arm and her cheek.  The injection on 1/28/25 did help a lot until the fall.  She still gets a strong " "muscle spasm in the back when she goes to bed.     The pain is located in the mid lower back area and radiates to the lower back.  The pain is described as aching and "pressure"    At BEST  0/10   At WORST  10/10 on the WORST day.    On average pain is rated as 5/10.   Today the pain is rated as 0/10  Symptoms interfere with daily activity.   Exacerbating factors: Sitting, Standing, Laying, and Walking.    Mitigating factors medications.     Interval History 12/20/2024:   Vivienne Jose is a 75 y.o. female presents to the clinic for follow up.  Since last visit the pain has has worsened.  The pain is located in the lower back area and radiates to the bilateral sides.  The pain is described as aching and pressure   The patient states that her back pain is located in the low/mid low back.  She states that it was getting better to where she didn't feel it but then all of a sudden it started getting worse again.  She denies any inciting event when it started getting worse again.  The pain is worse with laying down.  Leaning to the side starts hurting.  The pain is not the same level as prior from the compression fracture.      At BEST  5/10   At WORST  5/10 on the WORST day.    On average pain is rated as 5/10.   Today the pain is rated as 5/10  Symptoms interfere with daily activity.   Exacerbating factors: Sitting, Standing, Laying, and Walking.    Mitigating factors nothing.     Interval History 9/27/2024:   Vivienne Jose is a 75 y.o. female presents to the clinic for follow up.  Since last visit the pain has has slightly improved.  The patient is s/p T12/L1 spinejack on 9/23 and the abdominal and back pain is improving. The muscle spasms have eased up a little.    The pain is located in the lower back area and radiates to the right side.  The pain is described as aching    At BEST  5/10   At WORST  8/10 on the WORST day.    On average pain is rated as 5/10.   Today the pain is rated as 3/10  Symptoms interfere with " "daily activity.   Exacerbating factors: Sitting, Standing, Laying, and Walking.    Mitigating factors medications.     Initial hx:  Vivienne Jose is a 75 y.o. female presents to the clinic for the evaluation of lower mid back  pain. The pain started years ago following no inciting event and symptoms have been worsening.  The patient states that she was trying to  her mother in the beginning of August and she felt a "snap" and then went to the ED and was found to have acute compression fracture at T12 and L1.  The back pain has gotten much worse since it started.    Her pain is actually located predominantly in the low back.  She does not have too much pain in the upper low back.  The pain seems to start in the low back and will wrap around to the abdomen.  This has gotten worse since the compression fracture.      Pain Description:    The pain is located in the lower back area and radiates to the stomach.    At BEST  2/10   At WORST  10/10 on the WORST day.    On average pain is rated as 5/10.   Today the pain is rated as 5/10  The pain is continuous.  The pain is described as aching and shooting.    Symptoms interfere with daily activity and sleeping.   Exacerbating factors: Sitting, Standing, Laying, Bending, Lifting, and Getting out of bed/chair.    Mitigating factors medications.   She reports six hours of sleep per night.    Physical Therapy/Home Exercise: No, not currently in physical therapy or home exercise program    Current Pain Medications:    - calcitonin, tizanidine, gabapentin, duloxetine 20mg    Failed Pain Medications:    - prednisone, cannot take NSAIDs    Pain Treatment Therapies:    Pain procedures: none  Physical Therapy: yes  Chiropractor: none  Acupuncture: none  TENS unit: none  Spinal decompression: none  Joint replacement: R TKA    Patient denies urinary incontinence and bowel incontinence and weakness  Patient denies any suicidal or homicidal ideations     report:  Reviewed and " consistent with medication use as prescribed.    Imaging:   MRI Lumbar 09/2024:  FINDINGS:  Alignment: Grade 1 anterolisthesis of L4 on L5.     Vertebrae: Compression deformity of the T12 vertebral body with approximately 30% height loss and 5 mm of associated retropulsion.  Additional mild compression deformity of the L1 vertebral body with minimal retropulsion eccentric to the right.  Extensive marrow edema throughout the T12 vertebral body and to a lesser extent L1 vertebral body.  Mild edema of the prevertebral soft tissues.  Trace fluid in the T11-T12 disc space, likely post-traumatic.  L1 vertebral body hemangioma.     Discs: Mild disc height loss of T11-T12, T12-L1 and L4-L5. no evidence for discitis.     Cord: No cord signal abnormality or evidence of arachnoiditis.  Conus terminates at L1-L2.     Degenerative findings:     T11-T12: Fracture level with 5 mm of retropulsion of the superior endplate of T12 with mild spinal canal stenosis.     T12-L1: Fracture level with minimal retropulsion eccentric to the right resulting in mild spinal canal stenosis.     L1-L2: Circumferential disc bulge and mild facet arthropathy.  No spinal canal stenosis or neural foraminal narrowing.     L2-L3: Circumferential disc bulge with right paracentral protrusion and mild facet arthropathy.  No spinal canal stenosis or neural foraminal narrowing.     L3-L4: Mild facet arthropathy.  No spinal canal stenosis or neural foraminal narrowing.     L4-L5: Uncovering of the intervertebral disc with circumferential bulge and severe facet arthropathy result in mild spinal canal stenosis with moderate effacement of the right lateral recess as well as moderate right, mild left neural foraminal narrowing.  Trace bilateral facet effusions noted.     L5-S1: Moderate facet arthropathy.  No spinal canal stenosis or neural foraminal narrowing.     Paraspinal muscles & soft tissues: Bilateral simple renal cysts.  Moderate atrophy of the paraspinal  muscles.        6/5/2025     8:56 AM 3/5/2025     2:40 PM 12/20/2024     2:40 PM 9/5/2024    11:18 AM 6/9/2015     1:57 PM   Pain Disability Index (PDI)   Family/Home Responsibilities: 4 5 5 5 6   Recreation: 4 5 5 5 4   Occupation: 4 5 5 5 3   Sexual Behavior: 4 5 5 5 0   Self Care: 4 5 5 5 0   Life-Support Activities: 4 5 5 5 0   Pain Disability Index (PDI) 28 35 35 35 14        Past Medical History:   Diagnosis Date    Allergy     Angio-edema     Arthritis     Cataract     bilateral - not removed    Cerebrovascular malformation     Cirrhosis 11/24/2020    Colon polyps     Diabetes mellitus, type 2     Diabetic peripheral neuropathy     Edema of both feet 8/19/2022    GERD (gastroesophageal reflux disease)     Herpes infection     Hypothyroidism     Kidney stones     Mild nonproliferative diabetic retinopathy of 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 edema associated with type 2 diabetes mellitus 4/18/2019    DEL RIO (nonalcoholic steatohepatitis) 8/19/2022    Nausea & vomiting 11/23/2015    Obesity, morbid     Ovarian cyst     Seizure disorder, focal motor     Sleep apnea     Type II or unspecified type diabetes mellitus with neurological manifestations, uncontrolled(250.62)     Urticaria      Past Surgical History:   Procedure Laterality Date    CARPAL TUNNEL RELEASE Right 2014    CATARACT EXTRACTION W/  INTRAOCULAR LENS IMPLANT Right 9/19/2023    Procedure: EXTRACTION, CATARACT, WITH IOL INSERTION;  Surgeon: Lyndon Ortiz MD;  Location: [facility];  Service: Ophthalmology;  Laterality: Right;    CATARACT EXTRACTION W/  INTRAOCULAR LENS IMPLANT Left 10/3/2023    Procedure: EXTRACTION, CATARACT, WITH IOL INSERTION;  Surgeon: Lyndon Ortiz MD;  Location: [facility] OR;  Service: Ophthalmology;  Laterality: Left;    COLONOSCOPY      COLONOSCOPY N/A 9/13/2017    Procedure: COLONOSCOPY Golytely;  Surgeon: Gisela Wall MD;  Location: [facility];  Service: Endoscopy;  Laterality: N/A;    COLONOSCOPY N/A 9/9/2022     Procedure: COLONOSCOPY Extended Suprep;  Surgeon: Britt Jasso MD;  Location: [facility];  Service: Endoscopy;  Laterality: N/A;    CYST REMOVAL      skin; multiples    ESOPHAGOGASTRODUODENOSCOPY Left 10/15/2021    Procedure: EGD (ESOPHAGOGASTRODUODENOSCOPY);  Surgeon: Luis Fernando Taveras MD;  Location: [facility] (4TH FLR);  Service: Endoscopy;  Laterality: Left;  cirrhosis labwork am of procedure  last seizure 1973  COVID test on 10/12/21 at [facility]    ESOPHAGOGASTRODUODENOSCOPY N/A 3/10/2023    Procedure: EGD (ESOPHAGOGASTRODUODENOSCOPY);  Surgeon: Christa Caldera MD;  Location: [facility] (2ND FLR);  Service: Endoscopy;  Laterality: N/A;  Medically Urgent  cirrohsis-labs done on 2/9/23 (< 90 days)  New onset A-fib  3/1 instructions to portal-st  Precall complete- [place]    ESOPHAGOGASTRODUODENOSCOPY N/A 4/17/2025    Procedure: EGD (ESOPHAGOGASTRODUODENOSCOPY);  Surgeon: Alec Gilbert MD;  Location: [facility];  Service: Endoscopy;  Laterality: N/A;  ref by    Nell Christianson MD, portal,glp-1, cirrhosis labs completed-ae    EXTRACORPOREAL SHOCK WAVE LITHOTRIPSY  2002    HYSTERECTOMY  1984    INJECTION OF ANESTHETIC AGENT AROUND MEDIAL BRANCH NERVES INNERVATING LUMBAR FACET JOINT Bilateral 6/17/2025    Procedure: b/l L1-2 TFESI;  Surgeon: Myron Ocasio DO;  Location: [facility];  Service: Pain Management;  Laterality: Bilateral;  - RN sed - stop ASA take on her own - stop ozempic    INJECTION, EPIDURAL, SPINE, CERVICOTHORACIC, TRANSFORAMINAL APPROACH Bilateral 1/28/2025    Procedure: b/l L1-l2 TFESI;  Surgeon: Myron Ocasio DO;  Location: [facility];  Service: Pain Management;  Laterality: Bilateral;  ASA 5 days Ozempic 7 days    JOINT REPLACEMENT Right 03/06/2019    knee    KIDNEY STONE SURGERY      KNEE ARTHROPLASTY Right 3/6/2019    Procedure: ARTHROPLASTY, KNEE;  Surgeon: Pj Gamboa MD;  Location: [facility];  Service: Orthopedics;  Laterality: Right;  Depuy  (Stephen notified 2/21, CC)    KYPHOPLASTY, SPINE, LUMBAR Bilateral 9/23/2024    Procedure: T12 and L1 SpineJack ([place]);  Surgeon: Myron Ocasio DO;  Location: [facility] OR;  Service: Pain Management;  Laterality: Bilateral;    THYROIDECTOMY  1977         TONSILLECTOMY, ADENOIDECTOMY      TRIGGER FINGER RELEASE      TUBAL LIGATION       Social History     Socioeconomic History    Marital status:    Occupational History    Occupation: retired     Employer: [employer]   Tobacco Use    Smoking status: Never     Passive exposure: Never    Smokeless tobacco: Never   Substance and Sexual Activity    Alcohol use: No     Alcohol/week: 0.0 standard drinks of alcohol    Drug use: No    Sexual activity: Yes     Partners: Male     Social Drivers of Health     Financial Resource Strain: Low Risk  (1/17/2025)    Overall Financial Resource Strain (CARDIA)     Difficulty of Paying Living Expenses: Not hard at all   Food Insecurity: No Food Insecurity (1/17/2025)    Hunger Vital Sign     Worried About Running Out of Food in the Last Year: Never true     Ran Out of Food in the Last Year: Never true   Transportation Needs: No Transportation Needs (8/9/2024)    PRAPARE - Transportation     Lack of Transportation (Medical): No     Lack of Transportation (Non-Medical): No   Physical Activity: Inactive (1/17/2025)    Exercise Vital Sign     Days of Exercise per Week: 0 days     Minutes of Exercise per Session: 0 min   Stress: No Stress Concern Present (1/17/2025)    [employer] [place] [employer] - Occupational Stress Questionnaire     Feeling of Stress : Only a little   Housing Stability: Unknown (1/17/2025)    Housing Stability Vital Sign     Unable to Pay for Housing in the Last Year: No     Homeless in the Last Year: No     Family History   Problem Relation Name Age of Onset    Skin cancer Mother      Macular degeneration Mother      Dementia Mother      Hypertension Mother      Cancer Father       Arthritis Father      Gout Father      No Known Problems Daughter Madisyn     Diabetes Daughter Rudy     Hypertension Daughter Rudy     Arthritis Daughter Arianna     Allergies Son Lyndon     Allergic rhinitis Son Lyndon     Glaucoma Maternal Aunt          Great Maternal Aunt    Leukemia Maternal Uncle      Amblyopia Neg Hx      Cataracts Neg Hx      Retinal detachment Neg Hx      Strabismus Neg Hx      Stroke Neg Hx      Thyroid disease Neg Hx      Kidney disease Neg Hx      Angioedema Neg Hx      Asthma Neg Hx      Atopy Neg Hx      Eczema Neg Hx      Immunodeficiency Neg Hx      Rhinitis Neg Hx      Urticaria Neg Hx         Review of patient's allergies indicates:   Allergen Reactions    Sulfa (sulfonamide antibiotics) Nausea Only    Ciprofloxacin     Januvia [sitagliptin]      abd pain    Lisinopril     Lotensin [benazepril]     Nexium [esomeprazole magnesium] Other (See Comments)     Gas       Current Outpatient Medications   Medication Sig    abaloparatide (TYMLOS) 80 mcg (3,120 mcg/1.56 mL) PnIj Inject 0.04 mLs (80 mcg total) into the skin once daily.    ascorbic acid, vitamin C, (VITAMIN C) 100 MG tablet Take 500 mg by mouth 2 (two) times daily.     aspirin (ECOTRIN) 81 MG EC tablet Take 81 mg by mouth once daily.    betamethasone dipropionate (DIPROLENE) 0.05 % ointment Apply topically 2 (two) times daily. Use to affected areas for up to 2 weeks then take a 1 week break or decrease to 3 times weekly. Do not apply to groin or face. Use to spot on ear when flaring    blood sugar diagnostic (TRUE METRIX GLUCOSE TEST STRIP) Strp TEST TWO TIMES DAILY    blood-glucose meter Misc [employer] True Metrix Air meter    DULoxetine (CYMBALTA) 60 MG capsule Take 1 capsule (60 mg total) by mouth 2 (two) times daily.    estradioL (ESTRING) 2 mg (7.5 mcg /24 hour) vaginal ring Remove old Estring, place the new one every 3 months.    fluticasone propionate (FLONASE) 50 mcg/actuation nasal spray 2 sprays (100 mcg total) by  "Each Nostril route once daily.    furosemide (LASIX) 20 MG tablet Take 1 tablet (20 mg total) by mouth once daily.    gabapentin (NEURONTIN) 600 MG tablet TAKE 1 TABLET THREE TIMES DAILY    glipiZIDE (GLUCOTROL) 5 MG tablet TAKE 1 TABLET TWICE DAILY BEFORE MEALS    ipratropium (ATROVENT) 42 mcg (0.06 %) nasal spray 2 sprays by Each Nostril route 4 (four) times daily.    ketotifen (ZADITOR) 0.025 % (0.035 %) ophthalmic solution Place 1-2 drops into both eyes once daily.    lactulose (CHRONULAC) 20 gram/30 mL Soln Take 30 mLs (20 g total) by mouth 2 (two) times daily as needed (titrate to have 2-3 bowl movements per day).    lancets (TRUEPLUS LANCETS) 28 gauge Misc Inject 1 lancet into the skin 2 (two) times daily before meals.    levothyroxine (SYNTHROID) 75 MCG tablet Take 1 tablet (75 mcg total) by mouth once daily.    loratadine (CLARITIN) 10 mg tablet Take 10 mg by mouth once daily.    lovastatin (MEVACOR) 40 MG tablet TAKE 1 TABLET NIGHTLY (SUBSTITUTED FOR MEVACOR)    metFORMIN (GLUCOPHAGE) 1000 MG tablet TAKE 1 TABLET TWICE DAILY WITH MEALS    mv-mn/folic acid/vit [id number] (ALIVE ONCE DAILY WOMEN 50 PLUS ORAL) Take 1 tablet by mouth once daily.    omeprazole (PRILOSEC) 40 MG capsule Take 1 capsule (40 mg total) by mouth 2 (two) times a day.    pen needle, diabetic (BD ULTRA-FINE EDUARDO PEN NEEDLE) 32 gauge x 5/32" Ndle Inject 1 Needle into the skin once daily.    semaglutide (OZEMPIC) 1 mg/dose (4 mg/3 mL) Inject 1 mg into the skin every 7 days.    spironolactone (ALDACTONE) 50 MG tablet TAKE 1 TABLET EVERY OTHER DAY    TRESIBA FLEXTOUCH U-100 100 unit/mL (3 mL) insulin pen Inject 25 Units into the skin once daily.    UNABLE TO FIND Take 1 tablet by mouth 2 (two) times a day. medication name: Magnesium 400mg, Calcium 1000 mg, D3 15mcg, Zinc 15mg    valACYclovir (VALTREX) 500 MG tablet Take 1 tablet (500 mg total) by mouth once daily.    valsartan (DIOVAN) 160 MG tablet Take 1 tablet (160 mg total) by mouth once " "daily.    azelastine (ASTELIN) 137 mcg (0.1 %) nasal spray 2 sprays (274 mcg total) by Nasal route 2 (two) times daily.     Current Facility-Administered Medications   Medication    capsaicin-skin cleanser patch 4 patch       REVIEW OF SYSTEMS:    GENERAL:  No weight loss, malaise or fevers.  HEENT:   No recent changes in vision or hearing  NECK:  Negative for lumps, no difficulty with swallowing.  RESPIRATORY:  Negative for cough, wheezing or shortness of breath, patient denies any recent URI.  CARDIOVASCULAR:  Negative for chest pain, leg swelling or palpitations.  GI:  Negative for abdominal discomfort, blood in stools or black stools or change in bowel habits.  MUSCULOSKELETAL:  See HPI.  SKIN:  Negative for lesions, rash, and itching.  PSYCH:  No mood disorder or recent psychosocial stressors.  Patients sleep is not disturbed secondary to pain.  HEMATOLOGY/LYMPHOLOGY:  Negative for prolonged bleeding, bruising easily or swollen nodes.  Patient is not currently taking any anti-coagulants  NEURO:   No history of headaches, syncope, paralysis, seizures or tremors.  All other reviewed and negative other than HPI.    OBJECTIVE:    /77 (BP Location: Left arm, Patient Position: Sitting)   Pulse 76   Ht 5' 2" (1.575 m)   Wt 104.4 kg (230 lb 2.6 oz)   LMP  (LMP Unknown)   BMI 42.10 kg/m²     PHYSICAL EXAMINATION:    GENERAL: Well appearing, in no acute distress, alert and oriented x3.  PSYCH:  Mood and affect appropriate.  SKIN: Skin color, texture, turgor normal, no rashes or lesions.  HEAD/FACE:  Normocephalic, atraumatic. Cranial nerves grossly intact.  CV: RRR with palpation of the radial artery.  PULM: CTAB. No evidence of respiratory difficulty, symmetric chest rise.  GI:  Soft and non-tender. Obese    BACK:  - No obvious deformity or signs of trauma, Normal lumbar lordotic curve  - Negative spinous process tenderness  - Positive paravertebral tenderness  - Positive pain to palpation over the facet " joints of the lumbar spine.   - Negative QL / Iliac crest / Glut tenderness  - Slump test is Negative for radicular pain  - Slump test is Positive for back pain  - Supine Straight leg raising is Did not perform for radicular pain  - Supine Straight leg raising is Did not perform for back pain  - Lumbar ROM is diminished in Flexion without pain  - Lumbar ROM is diminished in Extension with pain  - Lumbar ROM is diminished in Lateral Flexion with pain  - Did not perform Sustained Hip Flexion test (for discogenic pain)  - Positive Altered Gait, Posture  - Axial facet loading test Positive on the bilateral side(s)    SI Joint exam:  - Negative SI joint tenderness to palpation  - Seven's sign Did not perform  - Yeoman's Test: Did not perform for SI joint pain indicating anterior SI ligament involvement. Did not perform for anterior thigh pain/paresthesia which indicates femoral nerve stretch.  - Gaenslen's Test:Negative  - Finger Josephine's Sign:Negative  - SI compression test:Did not perform  - SI distraction test:Did not perform  - Thigh Thrust: Negative  - SI Thrust: Did not perform    MUSKULOSKELETAL:    EXTREMITIES:   Hip Exam:  - Log Roll Negative  - FADIR Positive  - Stinchfield Positive  - Hip Scour Negative  - GTB Tenderness Negative    NEUROLOGICAL EXAM:  MENTAL STATUS: A x O x 3, good concentration, speech is fluent and goal directed  MEMORY: recent and remote are intact  CN: CN2-12 grossly intact  MOTOR: 5/5 in all muscle groups of LE  DTRs: 0+ intact symmetric

## 2025-07-16 ENCOUNTER — HOSPITAL ENCOUNTER (OUTPATIENT)
Dept: RADIOLOGY | Facility: HOSPITAL | Age: 75
Discharge: HOME | End: 2025-07-16
Attending: STUDENT IN AN ORGANIZED HEALTH CARE EDUCATION/TRAINING PROGRAM
Payer: MEDICARE

## 2025-07-16 ENCOUNTER — TELEPHONE (OUTPATIENT)
Dept: PAIN MEDICINE | Facility: CLINIC | Age: 75
End: 2025-07-16
Payer: MEDICARE

## 2025-07-16 DIAGNOSIS — M47.816 LUMBAR SPONDYLOSIS: Primary | ICD-10-CM

## 2025-07-16 DIAGNOSIS — M25.552 BILATERAL HIP PAIN: ICD-10-CM

## 2025-07-16 DIAGNOSIS — M25.551 BILATERAL HIP PAIN: ICD-10-CM

## 2025-07-16 PROCEDURE — 73521 X-RAY EXAM HIPS BI 2 VIEWS: CPT | Mod: TC

## 2025-07-16 PROCEDURE — 73521 X-RAY EXAM HIPS BI 2 VIEWS: CPT | Mod: 26,,, | Performed by: RADIOLOGY

## 2025-07-21 ENCOUNTER — CLINICAL SUPPORT (OUTPATIENT)
Dept: ALLERGY | Facility: CLINIC | Age: 75
End: 2025-07-21
Payer: MEDICARE

## 2025-07-21 DIAGNOSIS — J30.9 CHRONIC ALLERGIC RHINITIS: Primary | ICD-10-CM

## 2025-07-21 PROCEDURE — 95115 IMMUNOTHERAPY ONE INJECTION: CPT | Mod: S$GLB,,, | Performed by: ALLERGY & IMMUNOLOGY

## 2025-07-22 ENCOUNTER — TELEPHONE (OUTPATIENT)
Dept: PAIN MEDICINE | Facility: CLINIC | Age: 75
End: 2025-07-22
Payer: MEDICARE

## 2025-07-22 ENCOUNTER — TELEPHONE (OUTPATIENT)
Dept: PRIMARY CARE CLINIC | Facility: CLINIC | Age: 75
End: 2025-07-22
Payer: MEDICARE

## 2025-07-22 ENCOUNTER — OFFICE VISIT (OUTPATIENT)
Dept: PRIMARY CARE CLINIC | Facility: CLINIC | Age: 75
End: 2025-07-22
Payer: MEDICARE

## 2025-07-22 VITALS
OXYGEN SATURATION: 96 % | BODY MASS INDEX: 42.28 KG/M2 | DIASTOLIC BLOOD PRESSURE: 56 MMHG | HEIGHT: 62 IN | SYSTOLIC BLOOD PRESSURE: 120 MMHG | HEART RATE: 76 BPM | WEIGHT: 229.75 LBS

## 2025-07-22 DIAGNOSIS — E11.59 HYPERTENSION ASSOCIATED WITH DIABETES: ICD-10-CM

## 2025-07-22 DIAGNOSIS — N18.32 TYPE 2 DIABETES MELLITUS WITH STAGE 3B CHRONIC KIDNEY DISEASE, WITH LONG-TERM CURRENT USE OF INSULIN: Primary | ICD-10-CM

## 2025-07-22 DIAGNOSIS — I15.2 HYPERTENSION ASSOCIATED WITH DIABETES: ICD-10-CM

## 2025-07-22 DIAGNOSIS — E11.22 TYPE 2 DIABETES MELLITUS WITH STAGE 3B CHRONIC KIDNEY DISEASE, WITH LONG-TERM CURRENT USE OF INSULIN: Primary | ICD-10-CM

## 2025-07-22 DIAGNOSIS — Z79.4 TYPE 2 DIABETES MELLITUS WITH STAGE 3B CHRONIC KIDNEY DISEASE, WITH LONG-TERM CURRENT USE OF INSULIN: Primary | ICD-10-CM

## 2025-07-22 PROCEDURE — 3008F BODY MASS INDEX DOCD: CPT | Mod: CPTII,S$GLB,, | Performed by: STUDENT IN AN ORGANIZED HEALTH CARE EDUCATION/TRAINING PROGRAM

## 2025-07-22 PROCEDURE — 1159F MED LIST DOCD IN RCRD: CPT | Mod: CPTII,S$GLB,, | Performed by: STUDENT IN AN ORGANIZED HEALTH CARE EDUCATION/TRAINING PROGRAM

## 2025-07-22 PROCEDURE — 1157F ADVNC CARE PLAN IN RCRD: CPT | Mod: CPTII,S$GLB,, | Performed by: STUDENT IN AN ORGANIZED HEALTH CARE EDUCATION/TRAINING PROGRAM

## 2025-07-22 PROCEDURE — 99999 PR PBB SHADOW E&M-EST. PATIENT-LVL V: CPT | Mod: PBBFAC,,, | Performed by: STUDENT IN AN ORGANIZED HEALTH CARE EDUCATION/TRAINING PROGRAM

## 2025-07-22 PROCEDURE — 4010F ACE/ARB THERAPY RXD/TAKEN: CPT | Mod: CPTII,S$GLB,, | Performed by: STUDENT IN AN ORGANIZED HEALTH CARE EDUCATION/TRAINING PROGRAM

## 2025-07-22 PROCEDURE — 99214 OFFICE O/P EST MOD 30 MIN: CPT | Mod: S$GLB,,, | Performed by: STUDENT IN AN ORGANIZED HEALTH CARE EDUCATION/TRAINING PROGRAM

## 2025-07-22 PROCEDURE — 1160F RVW MEDS BY RX/DR IN RCRD: CPT | Mod: CPTII,S$GLB,, | Performed by: STUDENT IN AN ORGANIZED HEALTH CARE EDUCATION/TRAINING PROGRAM

## 2025-07-22 PROCEDURE — 3288F FALL RISK ASSESSMENT DOCD: CPT | Mod: CPTII,S$GLB,, | Performed by: STUDENT IN AN ORGANIZED HEALTH CARE EDUCATION/TRAINING PROGRAM

## 2025-07-22 PROCEDURE — 1125F AMNT PAIN NOTED PAIN PRSNT: CPT | Mod: CPTII,S$GLB,, | Performed by: STUDENT IN AN ORGANIZED HEALTH CARE EDUCATION/TRAINING PROGRAM

## 2025-07-22 PROCEDURE — 3061F NEG MICROALBUMINURIA REV: CPT | Mod: CPTII,S$GLB,, | Performed by: STUDENT IN AN ORGANIZED HEALTH CARE EDUCATION/TRAINING PROGRAM

## 2025-07-22 PROCEDURE — 3078F DIAST BP <80 MM HG: CPT | Mod: CPTII,S$GLB,, | Performed by: STUDENT IN AN ORGANIZED HEALTH CARE EDUCATION/TRAINING PROGRAM

## 2025-07-22 PROCEDURE — 3066F NEPHROPATHY DOC TX: CPT | Mod: CPTII,S$GLB,, | Performed by: STUDENT IN AN ORGANIZED HEALTH CARE EDUCATION/TRAINING PROGRAM

## 2025-07-22 PROCEDURE — 1101F PT FALLS ASSESS-DOCD LE1/YR: CPT | Mod: CPTII,S$GLB,, | Performed by: STUDENT IN AN ORGANIZED HEALTH CARE EDUCATION/TRAINING PROGRAM

## 2025-07-22 PROCEDURE — 3051F HG A1C>EQUAL 7.0%<8.0%: CPT | Mod: CPTII,S$GLB,, | Performed by: STUDENT IN AN ORGANIZED HEALTH CARE EDUCATION/TRAINING PROGRAM

## 2025-07-22 PROCEDURE — 3074F SYST BP LT 130 MM HG: CPT | Mod: CPTII,S$GLB,, | Performed by: STUDENT IN AN ORGANIZED HEALTH CARE EDUCATION/TRAINING PROGRAM

## 2025-07-22 RX ORDER — INSULIN DEGLUDEC 100 U/ML
13 INJECTION, SOLUTION SUBCUTANEOUS DAILY
Start: 2025-07-22

## 2025-07-22 NOTE — ASSESSMENT & PLAN NOTE
Last A1c of 7.4. since then, we increased ozempic dose to 1 mg. controlled on metformin 1000 mg bid, ozempic 1 mg weekly, glipizide 5 mg bid, tresiba 15 U daily. Tresiba was reduced. I would like to further reduce to 13 units to avoid BS being in the 70s. On digital medicine, continue.

## 2025-07-22 NOTE — ASSESSMENT & PLAN NOTE
BP is lower than I would like it to be. 100/64, HR 76 sitting. 98/63, HR 78 when done wit the machine. Manually sitting was 120/50 and standing was 100/50. She took spironolactone today and could be why its lower today. She will see hepatology in sept. I would like to see if its possible to take 25 mg daily instead of 50 mg every other day. Told her that, On the days she takes spironolactone, take only half tablet of valsartan

## 2025-07-22 NOTE — PROGRESS NOTES
Clinic Note  7/22/2025      Subjective:       Patient ID:  Vivienne is a 75 y.o. female being seen for an established visit.    Chief Complaint: Follow-up    HPI  Vivienne Jose is a 75 y.o.  female who presents with HTN, type 2 DM, HLD, DEL RIO, hypothyroidism (s/p thyroidectomy due to cysts), nephrolithiasis, recurrent UTIs (sees dr. Cee), sleep apnea, neuropathy is here for a f/u visit. Did acp 2025  -states her vision is off. States she stands up, gets a little dizzy and she sees yellow bright lights. Her BS is never less than 70. She thinks this started in June. Had an eye exam in may and was told her vision was good but she was not having these symptoms then. Will do orthostatics. 100/64, HR 76 sitting. 98/63, HR 78 when done wit the machine. Manually sitting was 120/50 and standing was 100/50. She took spironolactone today and could be why its lower today.   -BS is In the 70s at times. Will reduce tresiba dose to 13 units from 15 and asked her to let me know if she still notices BS in the 70s.  -lost a couple of pounds.   -having some cold sweats when she experiences severe back pain. seeing pain medicine for her back pain.   -her last CMP with potassium of 5.5. no repeat lab noted but she has a cmp scheduled for aug 6th. Not on any potassium supplements.          Review of Systems   Constitutional:  Negative for chills and fever.   HENT:  Negative for congestion and hearing loss.    Respiratory:  Negative for cough and shortness of breath.    Cardiovascular:  Negative for chest pain.   Gastrointestinal:  Negative for abdominal pain, blood in stool, constipation and diarrhea.   Genitourinary:  Negative for dysuria and hematuria.   Neurological:  Negative for dizziness and headaches.       Medication List with Changes/Refills   Current Medications    ABALOPARATIDE (TYMLOS) 80 MCG (3,120 MCG/1.56 ML) PNIJ    Inject 0.04 mLs (80 mcg total) into the skin once daily.    ASCORBIC ACID, VITAMIN C, (VITAMIN C)  100 MG TABLET    Take 500 mg by mouth 2 (two) times daily.     ASPIRIN (ECOTRIN) 81 MG EC TABLET    Take 81 mg by mouth once daily.    AZELASTINE (ASTELIN) 137 MCG (0.1 %) NASAL SPRAY    2 sprays (274 mcg total) by Nasal route 2 (two) times daily.    BETAMETHASONE DIPROPIONATE (DIPROLENE) 0.05 % OINTMENT    Apply topically 2 (two) times daily. Use to affected areas for up to 2 weeks then take a 1 week break or decrease to 3 times weekly. Do not apply to groin or face. Use to spot on ear when flaring    BLOOD SUGAR DIAGNOSTIC (TRUE METRIX GLUCOSE TEST STRIP) STRP    TEST TWO TIMES DAILY    BLOOD-GLUCOSE METER MISC    [employer] True Metrix Air meter    DULOXETINE (CYMBALTA) 60 MG CAPSULE    Take 1 capsule (60 mg total) by mouth 2 (two) times daily.    ESTRADIOL (ESTRING) 2 MG (7.5 MCG /24 HOUR) VAGINAL RING    Remove old Estring, place the new one every 3 months.    FLUTICASONE PROPIONATE (FLONASE) 50 MCG/ACTUATION NASAL SPRAY    2 sprays (100 mcg total) by Each Nostril route once daily.    FUROSEMIDE (LASIX) 20 MG TABLET    Take 1 tablet (20 mg total) by mouth once daily.    GABAPENTIN (NEURONTIN) 600 MG TABLET    TAKE 1 TABLET THREE TIMES DAILY    GLIPIZIDE (GLUCOTROL) 5 MG TABLET    TAKE 1 TABLET TWICE DAILY BEFORE MEALS    IPRATROPIUM (ATROVENT) 42 MCG (0.06 %) NASAL SPRAY    2 sprays by Each Nostril route 4 (four) times daily.    KETOTIFEN (ZADITOR) 0.025 % (0.035 %) OPHTHALMIC SOLUTION    Place 1-2 drops into both eyes once daily.    LACTULOSE (CHRONULAC) 20 GRAM/30 ML SOLN    Take 30 mLs (20 g total) by mouth 2 (two) times daily as needed (titrate to have 2-3 bowl movements per day).    LANCETS (TRUEPLUS LANCETS) 28 GAUGE MISC    Inject 1 lancet into the skin 2 (two) times daily before meals.    LEVOTHYROXINE (SYNTHROID) 75 MCG TABLET    Take 1 tablet (75 mcg total) by mouth once daily.    LORATADINE (CLARITIN) 10 MG TABLET    Take 10 mg by mouth once daily.    LOVASTATIN (MEVACOR) 40 MG TABLET    TAKE 1 TABLET  "NIGHTLY (SUBSTITUTED FOR MEVACOR)    METFORMIN (GLUCOPHAGE) 1000 MG TABLET    TAKE 1 TABLET TWICE DAILY WITH MEALS    MV-MN/FOLIC ACID/VIT [id number] (ALIVE ONCE DAILY WOMEN 50 PLUS ORAL)    Take 1 tablet by mouth once daily.    OMEPRAZOLE (PRILOSEC) 40 MG CAPSULE    Take 1 capsule (40 mg total) by mouth 2 (two) times a day.    PEN NEEDLE, DIABETIC (BD ULTRA-FINE EDUARDO PEN NEEDLE) 32 GAUGE X 5/32" NDLE    Inject 1 Needle into the skin once daily.    SEMAGLUTIDE (OZEMPIC) 1 MG/DOSE (4 MG/3 ML)    Inject 1 mg into the skin every 7 days.    SPIRONOLACTONE (ALDACTONE) 50 MG TABLET    TAKE 1 TABLET EVERY OTHER DAY    UNABLE TO FIND    Take 1 tablet by mouth 2 (two) times a day. medication name: Magnesium 400mg, Calcium 1000 mg, D3 15mcg, Zinc 15mg    VALACYCLOVIR (VALTREX) 500 MG TABLET    Take 1 tablet (500 mg total) by mouth once daily.    VALSARTAN (DIOVAN) 160 MG TABLET    Take 1 tablet (160 mg total) by mouth once daily.   Changed and/or Refilled Medications    Modified Medication Previous Medication    TRESIBA FLEXTOUCH U-100 100 UNIT/ML (3 ML) INSULIN PEN TRESIBA FLEXTOUCH U-100 100 unit/mL (3 mL) insulin pen       Inject 13 Units into the skin once daily.    Inject 25 Units into the skin once daily.           Objective:      BP (!) 120/56 (BP Location: Right arm, Patient Position: Sitting)   Pulse 76   Ht 5' 2" (1.575 m)   Wt 104.2 kg (229 lb 11.5 oz)   LMP  (LMP Unknown)   SpO2 96%   BMI 42.02 kg/m²   Estimated body mass index is 42.02 kg/m² as calculated from the following:    Height as of this encounter: 5' 2" (1.575 m).    Weight as of this encounter: 104.2 kg (229 lb 11.5 oz).  Physical Exam  Constitutional:       Appearance: Normal appearance.   Eyes:      Conjunctiva/sclera: Conjunctivae normal.   Cardiovascular:      Rate and Rhythm: Normal rate and regular rhythm.      Heart sounds: Normal heart sounds.   Pulmonary:      Effort: Pulmonary effort is normal. No respiratory distress.      Breath sounds: " Normal breath sounds.   Abdominal:      General: Bowel sounds are normal.   Musculoskeletal:      Right lower leg: No edema.      Left lower leg: No edema.   Skin:     General: Skin is warm.      Findings: Bruising present.   Neurological:      Mental Status: She is alert and oriented to person, place, and time.   Psychiatric:         Mood and Affect: Mood normal.         Behavior: Behavior normal.           Assessment and Plan:     1. Type 2 diabetes mellitus with stage 3b chronic kidney disease, with long-term current use of insulin  Assessment & Plan:  Last A1c of 7.4. since then, we increased ozempic dose to 1 mg. controlled on metformin 1000 mg bid, ozempic 1 mg weekly, glipizide 5 mg bid, tresiba 15 U daily. Tresiba was reduced. I would like to further reduce to 13 units to avoid BS being in the 70s. On digital medicine, continue.     Orders:  -     TRESIBA FLEXTOUCH U-100 100 unit/mL (3 mL) insulin pen; Inject 13 Units into the skin once daily.  -     Hemoglobin A1C; Future; Expected date: 07/22/2025    2. Hypertension associated with diabetes  Assessment & Plan:  BP is lower than I would like it to be. 100/64, HR 76 sitting. 98/63, HR 78 when done wit the machine. Manually sitting was 120/50 and standing was 100/50. She took spironolactone today and could be why its lower today. She will see hepatology in sept. I would like to see if its possible to take 25 mg daily instead of 50 mg every other day. Told her that, On the days she takes spironolactone, take only half tablet of valsartan                   Follow Up:   Follow up in about 3 months (around 10/22/2025).        Nalini Hall

## 2025-07-22 NOTE — TELEPHONE ENCOUNTER
Called pt per PCP to ask if they can come early to their appt today. Pt states she will try to come early but will probably be right on time.

## 2025-07-23 ENCOUNTER — PATIENT MESSAGE (OUTPATIENT)
Dept: ORTHOPEDICS | Facility: CLINIC | Age: 75
End: 2025-07-23
Payer: MEDICARE

## 2025-07-25 ENCOUNTER — TELEPHONE (OUTPATIENT)
Dept: PAIN MEDICINE | Facility: CLINIC | Age: 75
End: 2025-07-25
Payer: MEDICARE

## 2025-07-29 ENCOUNTER — HOSPITAL ENCOUNTER (OUTPATIENT)
Facility: HOSPITAL | Age: 75
Discharge: HOME | End: 2025-07-29
Attending: STUDENT IN AN ORGANIZED HEALTH CARE EDUCATION/TRAINING PROGRAM | Admitting: STUDENT IN AN ORGANIZED HEALTH CARE EDUCATION/TRAINING PROGRAM
Payer: MEDICARE

## 2025-07-29 VITALS
SYSTOLIC BLOOD PRESSURE: 132 MMHG | BODY MASS INDEX: 42.33 KG/M2 | OXYGEN SATURATION: 96 % | WEIGHT: 230 LBS | HEIGHT: 62 IN | RESPIRATION RATE: 16 BRPM | DIASTOLIC BLOOD PRESSURE: 61 MMHG | TEMPERATURE: 98 F | HEART RATE: 62 BPM

## 2025-07-29 DIAGNOSIS — M47.816 LUMBAR SPONDYLOSIS: Primary | ICD-10-CM

## 2025-07-29 LAB — POCT GLUCOSE: 72 MG/DL (ref 70–110)

## 2025-07-29 PROCEDURE — 25000003 PHARM REV CODE 250: Performed by: STUDENT IN AN ORGANIZED HEALTH CARE EDUCATION/TRAINING PROGRAM

## 2025-07-29 PROCEDURE — 64493 INJ PARAVERT F JNT L/S 1 LEV: CPT | Mod: 50,,, | Performed by: STUDENT IN AN ORGANIZED HEALTH CARE EDUCATION/TRAINING PROGRAM

## 2025-07-29 PROCEDURE — 63600175 PHARM REV CODE 636 W HCPCS: Performed by: STUDENT IN AN ORGANIZED HEALTH CARE EDUCATION/TRAINING PROGRAM

## 2025-07-29 PROCEDURE — 64493 INJ PARAVERT F JNT L/S 1 LEV: CPT | Mod: 50 | Performed by: STUDENT IN AN ORGANIZED HEALTH CARE EDUCATION/TRAINING PROGRAM

## 2025-07-29 PROCEDURE — 64494 INJ PARAVERT F JNT L/S 2 LEV: CPT | Mod: 50 | Performed by: STUDENT IN AN ORGANIZED HEALTH CARE EDUCATION/TRAINING PROGRAM

## 2025-07-29 PROCEDURE — 82962 GLUCOSE BLOOD TEST: CPT | Performed by: STUDENT IN AN ORGANIZED HEALTH CARE EDUCATION/TRAINING PROGRAM

## 2025-07-29 PROCEDURE — 64494 INJ PARAVERT F JNT L/S 2 LEV: CPT | Mod: 50,,, | Performed by: STUDENT IN AN ORGANIZED HEALTH CARE EDUCATION/TRAINING PROGRAM

## 2025-07-29 RX ORDER — ALPRAZOLAM 0.5 MG/1
0.5 TABLET, ORALLY DISINTEGRATING ORAL ONCE AS NEEDED
Status: COMPLETED | OUTPATIENT
Start: 2025-07-29 | End: 2025-07-29

## 2025-07-29 RX ORDER — LIDOCAINE HYDROCHLORIDE 20 MG/ML
INJECTION, SOLUTION EPIDURAL; INFILTRATION; INTRACAUDAL; PERINEURAL
Status: DISCONTINUED | OUTPATIENT
Start: 2025-07-29 | End: 2025-07-29 | Stop reason: HOSPADM

## 2025-07-29 RX ORDER — BUPIVACAINE HYDROCHLORIDE 2.5 MG/ML
INJECTION, SOLUTION EPIDURAL; INFILTRATION; INTRACAUDAL; PERINEURAL
Status: DISCONTINUED | OUTPATIENT
Start: 2025-07-29 | End: 2025-07-29 | Stop reason: HOSPADM

## 2025-07-29 RX ADMIN — ALPRAZOLAM 0.5 MG: 0.5 TABLET, ORALLY DISINTEGRATING ORAL at 12:07

## 2025-07-29 NOTE — DISCHARGE INSTRUCTIONS
[facility] [place]  Messaging service # [phone]  On-call pager for emergency# [phone]    MEDIAL BRANCH BLOCK POST-PROCEDURE INSTRUCTIONS:    What you need to do:  Keep a record of your response to the injection you had today.  It is very important that you accurately record how you responded to today's injection.  Please try to separate any soreness from the needle from your usual pain.  We want to know how this affects your usual pain.  Also REMEMBER that today's injection is a DIAGNOSTIC block, the pain relief will only last for a few hours to a few days.  Insurance requires 2 of these blocks before progressing to the ablation.  The sole purpose of this injection is to give us information, and not to treat your pain for an extended period.    Think about the amount of pain that you would have doing the most painful activities (I.e. bending, twisting, walking, standing straight, cleaning, etc...).  Now do those same activities as soon as you get home from the hospital.  Think about how much better you feel doing those activities now that the injection is done.  Does it feel 50%, 80%, 100% better than it would have otherwise?  This is the number you need to send me.    Send me a message through [facility] or leave a message at the phone number above telling me what % of improvement you got from the injection.    If you have difficulty putting a percentage on your pain relief fill this out:  (Rate each question below from 0-10 with 0 being no pain and 10 being the worst imaginable pain)    How bad would your pain get during activities like walking/going up stairs BEFORE the injection? _______    For the first 8 hours AFTER the injection, when you did those same painful activities, what was you best pain score? ____________    Send me those two numbers if you aren't able to give a  percentage.  ---------------------------------------------------------------------------------------------------------------------------------------------------------------------------------------------  What to watch out for:    If you experience any of the following symptoms after your procedure, please notify the messaging service immediately (see above for contact information):   fever (increased oral temperature)   bleeding or swelling at the injection site,    drainage, rash or redness at the injection site    possible signs of infection    increased pain at the injection site   worsening of your usual pain   severe headache   new or worsening numbness    new arm and/or leg weakness, or    changes in bowel and/or bladder function: urinating or defecating on yourself and not knowing that you did it.    PLEASE FOLLOW ALL INSTRUCTIONS CAREFULLY     Do not take a bath, swim or use Jacuzzi for 24 hours after procedure. (A shower is fine).   Remove any Band-Aids when you get home.    Use cold/ice, as needed for comfort.  We recommend the use of cold therapy alternating on for 20 minutes, off for 20 minutes.    Do not apply direct heat (heating pad or heat packs) to the injection site for 24 hours.     Resume your usual medications, unless instructed otherwise by your Pain Physician.     If you are on warfarin (Coumadin) or other blood thinner, resume this medication as instructed by your prescribing Physician.    IF AT ANY POINT YOU ARE VERY CONCERNED ABOUT YOUR SYMPTOMS, PLEASE GO TO THE EMERGENCY ROOM.    If you develop worsening pain, weakness, numbness, lose bowel or bladder control (i.e., having an accident where you did not even know you had to go to the bathroom and suddenly noticed you soiled yourself), saddle anesthesia (a loss of sensation restricted to the area of the buttocks, anus and between the legs -- i.e., those parts of your body that would touch a saddle if you were sitting on one) you need to go  immediately to the emergency department for evaluation and treatment.    ----------------------------------------------------------------------------------------------------------------------------------------------------------------  If you received Sedation please read the following instructions:  POST SEDATION INSTRUCTIONS    Today you received intravenous medication (also known as sedation) that was used to help you relax and/or decrease discomfort during your procedure. This medication will be acting in your body for the next 24 hours, so you might feel a little tired or sleepy. This feeling will slowly wear off.   Common side effects associated with these medications include: drowsiness, dizziness, sleepiness, confusion, feeling excited, difficulty remembering things, lack of steadiness with walking or balance, loss of fine muscle control, slowed reflexes, difficulty focusing, and blurred vision.  Some over-the-counter and prescription medications (e.g., muscle relaxants, opioids, mood-altering medications, sedatives/hypnotics, antihistamines) can interact with the intravenous medication you received and cause an increased risk of the side effects listed above in addition to other potentially life threatening side effects. Use extreme caution if you are taking such medications, and consult with your Pain Physician or prescribing physician if you have any questions.  For the next 12-24 hours:    DO NOT--Drive a car, operate machinery or power tools   DO NOT--Drink any alcoholic beverages (not even beer), they may dangerously increase the risk of side effects.    DO NOT--Make any important legal or business decisions or sign important documents.  We advise you to have someone to assist you at home. Move slowly and carefully. Do not make sudden changes in position. Be aware of dizziness or light-headedness and move accordingly.   If you seek medical treatment within 24 hours, let the nurse or doctor caring for  you know that you have received the above medications. If you have any questions or concerns related to your sedation or treatment today please contact us.

## 2025-07-29 NOTE — PLAN OF CARE
Vivienne Jose has met all discharge criteria from Phase II. Vital Signs are stable, ambulating  without difficulty. Discharge instructions given, patient verbalized understanding. Discharged from facility via wheelchair in stable condition.

## 2025-07-29 NOTE — DISCHARGE SUMMARY
"[facility]  Discharge Note  Short Stay    Procedure(s) (LRB):  b/l L3,4,5 MBB#1 (5" needle) (Bilateral)      OUTCOME: Patient tolerated treatment/procedure well without complication and is now ready for discharge.    DISPOSITION: Home or Self Care    FINAL DIAGNOSIS:  <principal problem not specified>    FOLLOWUP: In clinic    DISCHARGE INSTRUCTIONS:  No discharge procedures on file.     TIME SPENT ON DISCHARGE: 10 minutes  "

## 2025-07-29 NOTE — OP NOTE
"PROCEDURE: bilateral Lumbar L3, L4, and L5 Medial Branch Nerve Block    Patient Name: Vivienne Jose  MRN: [id number]    PROCEDURE DATE: 7/29/2025    BLOCK # 1    DIAGNOSIS: Lumbar spondylosis without Myelopathy  CPT CODE: 62333 (lumbar 1st level), 78560 (2nd level), 21866 (3rd and any additional levels)    POSTPROCEDURE DIAGNOSIS Same    PHYSICIAN: Myron Ocasio DO  NEEDLE TYPE: - 22G 5" Spinal Needle  MEDICATIONS INJECTED: 0.25% bupivicaine, 1ml at each level  LOCAL ANESTHETIC USED: Lidocaine 1%, 2ml at each level    Sedation Medications - None    Estimated Blood Loss - <2ml  Drains: None  Specimens Removed: None  Urine Output - Not Measured  Complications: None  Outcome: Good    PRE-PROCEDURE PAIN SCORE: 7/10    POST-PROCEDURE PAIN SCORE: 0/10    Informed Consent:  The patient's condition and proposed procedures, risks, and alternatives were discussed with the patient or responsible party.  The patient's / responsible party's questions were answered.   The patient / responsible party appeared to understand and chose to proceed.  Informed consent was obtained.    Procedure in Detail:  The patient was taken back to the OR fluoroscopy suite and placed in a prone position. The skin overlying the injection site was prepped and draped in an aseptic fashion. The target injection site (see above) was identified with fluoroscopy.     Procedural Pause:  A procedural pause verifying correct patient, medical record number, allergies, medications to be administered, current vital signs, and surgical site was performed immediately prior to beginning the procedure.    The skin and subcutaneous tissue overlying the target site of injection was anesthetized using 6 ml of 1% lidocaine MPF with a 25-gauge, 1½ -inch needle.   The above noted needle type was advanced to each target under fluoroscopic guidance parallel to the beam of the fluoroscope utilizing a bullseye approach.      Lumbar Medial Branch Blocks  The needle was " then advanced to the dorsal, superior, and medial aspect of the transverse process(es) adjacent the superior articular process at the levels and on the side(s) specified above.  An oblique view confirmed correct needle placement at the junction of the transverse process and base of the superior articular process.  A lateral image was obtained to confirm needle depth.  At each site the needle(s) rested on periosteum.    L5 Dorsal Ramus Nerve Block at Sacral Ala   The L5 dorsal ramus nerve block on the side(s) specified above was performed using a slightly oblique approach under fluoroscopic guidance, placing the needle within the groove between the sacral ala and the superior articular process of S1.  A 10-20° oblique view confirmed proper needle placement.  A lateral image was obtained to confirm needle depth.  The needle rested on periosteum.      After negative aspiration for heme or CSF, the above noted contrast was injected and persisted at each site under fluoroscopy, demonstrating absence of vascular uptake.   After negative aspiration for heme or CSF, the above noted solution was slowly injected at each site to avoid forcing the solution away from the target point(s).  The needle(s) were then removed.   The same procedural technique outlined above was repeated on the OPPOSITE side.    The heart rate, pulse oximetry, and blood pressure were continuously monitored throughout the procedure.  The procedure was well tolerated. She was carefully escorted to the recovery room in stable condition. Patient was monitored by RN for recovery period.  The patient will be contacted in the next few days to determine extent of relief.  Patient was given post procedure and discharge instructions to follow at home.  The patient was discharged in a stable condition.    Note Electronically Signed by:  Myron Wang  07/29/2025

## 2025-07-30 ENCOUNTER — TELEPHONE (OUTPATIENT)
Dept: PAIN MEDICINE | Facility: CLINIC | Age: 75
End: 2025-07-30
Payer: MEDICARE

## 2025-07-30 NOTE — TELEPHONE ENCOUNTER
Copied from CRM #[id number]. Topic: General Inquiry - Patient Advice  >> Jul 30, 2025  9:09 AM Kurtis wrote:  Who call ? Vivienne Jose     What is the request Details : Pt calling to speak with someone in provider office regarding procedure on 7/29. States injection helped 95 percent. Please call back.       Can clinic  use patient portal  : No     What number to call back : [phone]  >> Jul 30, 2025  9:22 AM Med Assistant Peg wrote:    ----- Message -----  From: Kurtis Clifford  Sent: 7/30/2025   9:11 AM CDT  To: Chi Loza

## 2025-08-05 ENCOUNTER — TELEPHONE (OUTPATIENT)
Dept: PAIN MEDICINE | Facility: CLINIC | Age: 75
End: 2025-08-05
Payer: MEDICARE

## 2025-08-06 ENCOUNTER — HOSPITAL ENCOUNTER (OUTPATIENT)
Dept: RADIOLOGY | Facility: HOSPITAL | Age: 75
Discharge: HOME | End: 2025-08-06
Attending: INTERNAL MEDICINE
Payer: MEDICARE

## 2025-08-06 DIAGNOSIS — K74.60 HEPATIC CIRRHOSIS, UNSPECIFIED HEPATIC CIRRHOSIS TYPE, UNSPECIFIED WHETHER ASCITES PRESENT: ICD-10-CM

## 2025-08-06 PROCEDURE — 76700 US EXAM ABDOM COMPLETE: CPT | Mod: 26,,, | Performed by: RADIOLOGY

## 2025-08-06 PROCEDURE — 76700 US EXAM ABDOM COMPLETE: CPT | Mod: TC

## 2025-08-07 ENCOUNTER — TELEPHONE (OUTPATIENT)
Dept: PRIMARY CARE CLINIC | Facility: CLINIC | Age: 75
End: 2025-08-07
Payer: MEDICARE

## 2025-08-07 NOTE — TELEPHONE ENCOUNTER
Called pt regarding lab results. Let patient know that her A1c improved to 6.3. Pt understood and has no questions.

## 2025-08-07 NOTE — TELEPHONE ENCOUNTER
----- Message from Nalini Hall MD sent at 8/7/2025 11:38 AM CDT -----  Let patient know that her A1c improved to 6.3.  ----- Message -----  From: Lab, Background User  Sent: 8/6/2025   5:51 PM CDT  To: Nalini Hall MD

## 2025-08-08 ENCOUNTER — PATIENT MESSAGE (OUTPATIENT)
Dept: ALLERGY | Facility: CLINIC | Age: 75
End: 2025-08-08

## 2025-08-08 ENCOUNTER — LAB VISIT (OUTPATIENT)
Dept: LAB | Facility: HOSPITAL | Age: 75
End: 2025-08-08
Payer: MEDICARE

## 2025-08-08 ENCOUNTER — TELEPHONE (OUTPATIENT)
Dept: ALLERGY | Facility: CLINIC | Age: 75
End: 2025-08-08

## 2025-08-08 ENCOUNTER — OFFICE VISIT (OUTPATIENT)
Dept: ALLERGY | Facility: CLINIC | Age: 75
End: 2025-08-08
Payer: MEDICARE

## 2025-08-08 VITALS — HEIGHT: 62 IN | BODY MASS INDEX: 42.52 KG/M2 | WEIGHT: 231.06 LBS

## 2025-08-08 DIAGNOSIS — Z51.6 ALLERGY DESENSITIZATION THERAPY: ICD-10-CM

## 2025-08-08 DIAGNOSIS — J30.0 VASOMOTOR RHINITIS: ICD-10-CM

## 2025-08-08 DIAGNOSIS — J30.81 CHRONIC ALLERGIC RHINITIS DUE TO ANIMAL HAIR AND DANDER: ICD-10-CM

## 2025-08-08 DIAGNOSIS — J30.89 ALLERGIC RHINITIS DUE TO DUST MITE: ICD-10-CM

## 2025-08-08 DIAGNOSIS — H10.13 ALLERGIC CONJUNCTIVITIS, BILATERAL: ICD-10-CM

## 2025-08-08 DIAGNOSIS — J30.9 CHRONIC ALLERGIC RHINITIS: ICD-10-CM

## 2025-08-08 DIAGNOSIS — J30.9 CHRONIC ALLERGIC RHINITIS: Primary | ICD-10-CM

## 2025-08-08 PROCEDURE — 86003 ALLG SPEC IGE CRUDE XTRC EA: CPT | Mod: HCNC

## 2025-08-08 PROCEDURE — 86003 ALLG SPEC IGE CRUDE XTRC EA: CPT | Mod: 59,HCNC

## 2025-08-08 PROCEDURE — 99999 PR PBB SHADOW E&M-EST. PATIENT-LVL II: CPT | Mod: PBBFAC,HCNC,, | Performed by: ALLERGY & IMMUNOLOGY

## 2025-08-08 PROCEDURE — 36415 COLL VENOUS BLD VENIPUNCTURE: CPT | Mod: HCNC,PO

## 2025-08-08 RX ORDER — AZELASTINE 1 MG/ML
2 SPRAY, METERED NASAL 2 TIMES DAILY
Qty: 120 ML | Refills: 4 | Status: SHIPPED | OUTPATIENT
Start: 2025-08-08 | End: 2025-09-07

## 2025-08-08 RX ORDER — FLUTICASONE PROPIONATE 50 MCG
2 SPRAY, SUSPENSION (ML) NASAL DAILY
Qty: 48 G | Refills: 4 | Status: SHIPPED | OUTPATIENT
Start: 2025-08-08

## 2025-08-08 NOTE — TELEPHONE ENCOUNTER
----- Message from Ailin Mills MD sent at 8/8/2025 11:41 AM CDT -----  Pr had annual visit today and she asked about moving her injections to [place], says she saw you there and that shots are Monday and Wednesday morning and she is fine with that if she can move
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
## 2025-08-08 NOTE — TELEPHONE ENCOUNTER
Left detailed message for pt. To call in regards to transferring allergy extract vials to [facility] from [facility] injection room.

## 2025-08-09 ENCOUNTER — PATIENT MESSAGE (OUTPATIENT)
Dept: ORTHOPEDICS | Facility: CLINIC | Age: 75
End: 2025-08-09
Payer: MEDICARE

## 2025-08-11 LAB
W WHITE PINE, CLASS: NORMAL
W WHITE PINE, IGE: <0.1 KU/L
W WILLOW, CLASS: NORMAL
W WILLOW, IGE: <0.1 KU/L

## 2025-08-12 ENCOUNTER — HOSPITAL ENCOUNTER (OUTPATIENT)
Facility: HOSPITAL | Age: 75
Discharge: HOME | End: 2025-08-12
Attending: STUDENT IN AN ORGANIZED HEALTH CARE EDUCATION/TRAINING PROGRAM | Admitting: STUDENT IN AN ORGANIZED HEALTH CARE EDUCATION/TRAINING PROGRAM
Payer: MEDICARE

## 2025-08-12 VITALS
SYSTOLIC BLOOD PRESSURE: 135 MMHG | OXYGEN SATURATION: 96 % | TEMPERATURE: 98 F | HEIGHT: 62 IN | BODY MASS INDEX: 42.33 KG/M2 | RESPIRATION RATE: 16 BRPM | DIASTOLIC BLOOD PRESSURE: 59 MMHG | WEIGHT: 230 LBS | HEART RATE: 86 BPM

## 2025-08-12 DIAGNOSIS — M81.0 OSTEOPOROSIS, UNSPECIFIED OSTEOPOROSIS TYPE, UNSPECIFIED PATHOLOGICAL FRACTURE PRESENCE: ICD-10-CM

## 2025-08-12 DIAGNOSIS — M80.80XA PATHOLOGICAL FRACTURE DUE TO OSTEOPOROSIS, UNSPECIFIED FRACTURE SITE, UNSPECIFIED OSTEOPOROSIS TYPE, INITIAL ENCOUNTER: ICD-10-CM

## 2025-08-12 DIAGNOSIS — M47.816 SPONDYLOSIS WITHOUT MYELOPATHY OR RADICULOPATHY, LUMBAR REGION: ICD-10-CM

## 2025-08-12 DIAGNOSIS — M47.816 LUMBAR SPONDYLOSIS: Primary | ICD-10-CM

## 2025-08-12 DIAGNOSIS — M81.6 LOCALIZED OSTEOPOROSIS OF LEQUESNE: ICD-10-CM

## 2025-08-12 LAB
POCT GLUCOSE: 144 MG/DL (ref 70–110)
W ALTERNARIA ALTERNATA, CLASS: NORMAL
W ALTERNARIA ALTERNATA, IGE: <0.1 KU/L
W ASPERGILLUS FUMIGATUS, CLASS: NORMAL
W ASPERGILLUS FUMIGATUS, IGE: <0.1 KU/L
W BAHIA GRASS, CLASS: NORMAL
W BAHIA GRASS, IGE: <0.1 KU/L
W BALD CYPRESS, CLASS: NORMAL
W BALD CYPRESS, IGE: <0.1 KU/L
W BERMUDA GRASS, CLASS: NORMAL
W BERMUDA GRASS, IGE: <0.1 KU/L
W CAT DANDER, CLASS: NORMAL
W CAT DANDER, IGE: <0.1 KU/L
W CHAETOMIUM GLOBOSUM, CLASS: NORMAL
W CHAETOMIUM GLOBOSUM, IGE: <0.1 KU/L
W CLADOSPORIUM HERBARUM, CLASS: NORMAL
W CLADOSPORIUM HERBARUM, IGE: <0.1 KU/L
W COCKROACH, GERMAN, CLASS: NORMAL
W COCKROACH, GERMAN, IGE: <0.1 KU/L
W COMMON RAGWEED (SHORT), CLASS: NORMAL
W COMMON RAGWEED (SHORT), IGE: <0.1 KU/L
W COMMON SILVER BIRCH, CLASS: NORMAL
W COMMON SILVER BIRCH, IGE: <0.1 KU/L
W COTTONWOOD, CLASS: NORMAL
W COTTONWOOD, IGE: <0.1 KU/L
W CURVULARIA LUNATA, CLASS: NORMAL
W CURVULARIA LUNATA, IGE: <0.1 KU/L
W DERMATOPHAGOIDES FARINAE CLASS: ABNORMAL
W DERMATOPHAGOIDES FARINAE, IGE: 0.28 KU/L
W DERMATOPHAGOIDES PTERONYSSINUS CLASS: NORMAL
W DERMATOPHAGOIDES PTERONYSSINUS, IGE: <0.1 KU/L
W DOG DANDER, CLASS: NORMAL
W DOG DANDER, IGE: <0.1 KU/L
W ENGLISH PLANTAIN, RIBWORT, CLASS: NORMAL
W ENGLISH PLANTAIN, RIBWORT, IGE: <0.1 KU/L
W HORSE DANDER , CLASS: NORMAL
W HORSE DANDER , IGE: <0.1 KU/L
W JOHNSON GRASS, CLASS: NORMAL
W JOHNSON GRASS, IGE: <0.1 KU/L
W MAPLE LEAF SYC., LONDON PLANE, CLASS: NORMAL
W MAPLE LEAF SYC., LONDON PLANE, IGE: <0.1 KU/L
W MUGWORT (SAGEBRUSH), CLASS: NORMAL
W MUGWORT (SAGEBRUSH), IGE: <0.1 KU/L
W OAK, CLASS: NORMAL
W OAK, IGE: <0.1 KU/L
W PECAN, HICKORY, CLASS: NORMAL
W PECAN, HICKORY, IGE: <0.1 KU/L
W PENICILLIUM CHRYSOGENUM, CLASS: NORMAL
W PENICILLIUM CHRYSOGENUM, IGE: <0.1 KU/L
W ROUGH MARSHELDER, CLASS: NORMAL
W ROUGH MARSHELDER, IGE: <0.1 KU/L
W SALTWORT, RUSSIAN THISTLE, CLASS: NORMAL
W SALTWORT, RUSSIAN THISTLE, IGE: <0.1 KU/L
W SETOMELANOMMA ROSTRATA, CLASS: NORMAL
W SETOMELANOMMA ROSTRATA, IGE: <0.1 KU/L
W TIMOTHY GRASS, CLASS: NORMAL
W TIMOTHY GRASS, IGE: <0.1 KU/L
W WALNUT TREE, CLASS: NORMAL
W WALNUT TREE, IGE: <0.1 KU/L
W WESTERN RAGWEED, CLASS: NORMAL
W WESTERN RAGWEED, IGE: <0.1 KU/L

## 2025-08-12 PROCEDURE — 64493 INJ PARAVERT F JNT L/S 1 LEV: CPT | Mod: 50,,, | Performed by: STUDENT IN AN ORGANIZED HEALTH CARE EDUCATION/TRAINING PROGRAM

## 2025-08-12 PROCEDURE — 64494 INJ PARAVERT F JNT L/S 2 LEV: CPT | Mod: 50 | Performed by: STUDENT IN AN ORGANIZED HEALTH CARE EDUCATION/TRAINING PROGRAM

## 2025-08-12 PROCEDURE — 64493 INJ PARAVERT F JNT L/S 1 LEV: CPT | Mod: 50 | Performed by: STUDENT IN AN ORGANIZED HEALTH CARE EDUCATION/TRAINING PROGRAM

## 2025-08-12 PROCEDURE — 25000003 PHARM REV CODE 250: Performed by: STUDENT IN AN ORGANIZED HEALTH CARE EDUCATION/TRAINING PROGRAM

## 2025-08-12 PROCEDURE — 64494 INJ PARAVERT F JNT L/S 2 LEV: CPT | Mod: 50,,, | Performed by: STUDENT IN AN ORGANIZED HEALTH CARE EDUCATION/TRAINING PROGRAM

## 2025-08-12 PROCEDURE — 82962 GLUCOSE BLOOD TEST: CPT | Performed by: STUDENT IN AN ORGANIZED HEALTH CARE EDUCATION/TRAINING PROGRAM

## 2025-08-12 PROCEDURE — 63600175 PHARM REV CODE 636 W HCPCS: Performed by: STUDENT IN AN ORGANIZED HEALTH CARE EDUCATION/TRAINING PROGRAM

## 2025-08-12 RX ORDER — BUPIVACAINE HYDROCHLORIDE 2.5 MG/ML
INJECTION, SOLUTION EPIDURAL; INFILTRATION; INTRACAUDAL; PERINEURAL
Status: DISCONTINUED | OUTPATIENT
Start: 2025-08-12 | End: 2025-08-12 | Stop reason: HOSPADM

## 2025-08-12 RX ORDER — ALPRAZOLAM 0.5 MG/1
1 TABLET, ORALLY DISINTEGRATING ORAL ONCE AS NEEDED
Status: DISCONTINUED | OUTPATIENT
Start: 2025-08-12 | End: 2025-08-12

## 2025-08-12 RX ORDER — ALPRAZOLAM 0.5 MG/1
0.5 TABLET, ORALLY DISINTEGRATING ORAL ONCE AS NEEDED
Status: COMPLETED | OUTPATIENT
Start: 2025-08-12 | End: 2025-08-12

## 2025-08-12 RX ORDER — LIDOCAINE HYDROCHLORIDE 20 MG/ML
INJECTION, SOLUTION EPIDURAL; INFILTRATION; INTRACAUDAL; PERINEURAL
Status: DISCONTINUED | OUTPATIENT
Start: 2025-08-12 | End: 2025-08-12 | Stop reason: HOSPADM

## 2025-08-12 RX ADMIN — ALPRAZOLAM 0.5 MG: 0.5 TABLET, ORALLY DISINTEGRATING ORAL at 11:08

## 2025-08-13 ENCOUNTER — PATIENT MESSAGE (OUTPATIENT)
Dept: PAIN MEDICINE | Facility: CLINIC | Age: 75
End: 2025-08-13
Payer: MEDICARE

## 2025-08-18 ENCOUNTER — CLINICAL SUPPORT (OUTPATIENT)
Dept: ALLERGY | Facility: CLINIC | Age: 75
End: 2025-08-18
Payer: MEDICARE

## 2025-08-18 ENCOUNTER — PATIENT MESSAGE (OUTPATIENT)
Dept: ALLERGY | Facility: CLINIC | Age: 75
End: 2025-08-18
Payer: MEDICARE

## 2025-08-18 DIAGNOSIS — J30.9 CHRONIC ALLERGIC RHINITIS: Primary | ICD-10-CM

## 2025-08-18 PROCEDURE — 95115 IMMUNOTHERAPY ONE INJECTION: CPT | Mod: S$GLB,,, | Performed by: STUDENT IN AN ORGANIZED HEALTH CARE EDUCATION/TRAINING PROGRAM

## 2025-08-19 ENCOUNTER — TELEPHONE (OUTPATIENT)
Dept: PAIN MEDICINE | Facility: CLINIC | Age: 75
End: 2025-08-19
Payer: MEDICARE

## 2025-08-26 ENCOUNTER — HOSPITAL ENCOUNTER (OUTPATIENT)
Facility: HOSPITAL | Age: 75
Discharge: HOME | End: 2025-08-26
Attending: STUDENT IN AN ORGANIZED HEALTH CARE EDUCATION/TRAINING PROGRAM | Admitting: STUDENT IN AN ORGANIZED HEALTH CARE EDUCATION/TRAINING PROGRAM
Payer: MEDICARE

## (undated) DEVICE — SOL IRR STRL WATER 500ML

## (undated) DEVICE — BLADE SURG #15 CARBON STEEL

## (undated) DEVICE — TAPE PAPER EYE KIT

## (undated) DEVICE — SUT 2/0 36IN COATED VICRYL

## (undated) DEVICE — NDL 18GA X1 1/2 REG BEVEL

## (undated) DEVICE — BLADE 90X13X1.27MM

## (undated) DEVICE — GLOVE PROTEXIS PI CLASSIC 6.0

## (undated) DEVICE — SHIELD FOX W/GARTER

## (undated) DEVICE — UNDERGLOVES BIOGEL PI SIZE 7.5

## (undated) DEVICE — SEE MEDLINE ITEM 152523

## (undated) DEVICE — MANIFOLD 4 PORT

## (undated) DEVICE — Device

## (undated) DEVICE — GLOVE BIOGEL SKINSENSE PI 7.5

## (undated) DEVICE — TAPE SURG MICROPORE 2 SGL USE

## (undated) DEVICE — GLOVE PROTEXIS HYDROGEL SZ8.5

## (undated) DEVICE — APPLICATOR CHLORAPREP ORN 26ML

## (undated) DEVICE — KIT ENDOKIT COMPLIANCE CUSTOM

## (undated) DEVICE — STAPLER SKIN ROTATING HEAD

## (undated) DEVICE — SUT SILK 2-0 PS 18IN BLACK

## (undated) DEVICE — DRAPE LAP T SHT W/ INSTR PAD

## (undated) DEVICE — TAPE CURAD PAPER 1INX1.5YD

## (undated) DEVICE — SYR SLIP TIP 1CC

## (undated) DEVICE — DRAPE INCISE IOBAN 2 23X17IN

## (undated) DEVICE — DRESSING TRANS 4X4 TEGADERM

## (undated) DEVICE — SUT CTD VICRYL CT-1 27

## (undated) DEVICE — SHEILD & GARTERS FOX METAL EYE

## (undated) DEVICE — TRAY MINOR GEN SURG

## (undated) DEVICE — DRAPE INCISE IOBAN 2 23X23IN

## (undated) DEVICE — DUOVISC

## (undated) DEVICE — GLOVE 6.5 PROTEXIS PI BLUE

## (undated) DEVICE — COVER OVERHEAD SURG LT BLUE

## (undated) DEVICE — SPONGE GAUZE 16PLY 4X4

## (undated) DEVICE — DRAPE PLASTIC U 60X72

## (undated) DEVICE — SEE MEDLINE ITEM 146231

## (undated) DEVICE — SEE MEDLINE ITEM 157117

## (undated) DEVICE — CHLORAPREP W TINT 26ML APPL

## (undated) DEVICE — BLADE SURG BVL ANG COAX 2.4MM

## (undated) DEVICE — GLOVE PROTEXIS HYDROGEL SZ8

## (undated) DEVICE — DRESSING AQUACEL AG ADV 3.5X12

## (undated) DEVICE — DRAPE STERI U-SHAPED 47X51IN

## (undated) DEVICE — DRAPE C-ARM ELAS CLIP 42X120IN

## (undated) DEVICE — BLADE SAW SAG 25.40MM X 1.27MM

## (undated) DEVICE — SEE MEDLINE ITEM 157125

## (undated) DEVICE — HOOD T-5 TEAR AWAY STERILE

## (undated) DEVICE — TOURNIQUET SB QC DP 24X4IN

## (undated) DEVICE — ELECTRODE REM PLYHSV RETURN 9

## (undated) DEVICE — SHIELD EYE METAL FOX 50/BX

## (undated) DEVICE — NDL SPINAL DISPOSABLE 23X3.5

## (undated) DEVICE — PAD PREP 50/CA

## (undated) DEVICE — NDL SPINAL 22GA 3.5 IN QUINCKE

## (undated) DEVICE — TAPE MICRO SURG SGL USE 1

## (undated) DEVICE — SEE MEDLINE ITEM 157150

## (undated) DEVICE — SOL BETADINE 5%

## (undated) DEVICE — BLADE SAG DUAL 25MM

## (undated) DEVICE — SOL NACL IRR 3000ML

## (undated) DEVICE — GOWN SURGICAL XX LARGE X LONG

## (undated) DEVICE — DRAPE C-ARMOR EQUIPMENT COVER

## (undated) DEVICE — KIT KYPHOPAK KYPHOPLASTY FX

## (undated) DEVICE — GLOVE BIOGEL SKINSENSE PI 7.0

## (undated) DEVICE — PULSAVAC ZIMMER